# Patient Record
Sex: FEMALE | URBAN - METROPOLITAN AREA
[De-identification: names, ages, dates, MRNs, and addresses within clinical notes are randomized per-mention and may not be internally consistent; named-entity substitution may affect disease eponyms.]

---

## 2018-03-29 ENCOUNTER — INPATIENT (INPATIENT)
Facility: HOSPITAL | Age: 53
LOS: 4 days | Discharge: HOME | End: 2018-04-03
Attending: INTERNAL MEDICINE | Admitting: INTERNAL MEDICINE
Payer: MEDICARE

## 2018-03-29 VITALS
DIASTOLIC BLOOD PRESSURE: 61 MMHG | HEART RATE: 60 BPM | RESPIRATION RATE: 18 BRPM | SYSTOLIC BLOOD PRESSURE: 111 MMHG | OXYGEN SATURATION: 100 %

## 2018-03-29 LAB
ALBUMIN SERPL ELPH-MCNC: 3.7 G/DL — SIGNIFICANT CHANGE UP (ref 3.3–5)
ALP SERPL-CCNC: 102 U/L — SIGNIFICANT CHANGE UP (ref 40–120)
ALT FLD-CCNC: 25 U/L — SIGNIFICANT CHANGE UP (ref 4–33)
ANISOCYTOSIS BLD QL: SLIGHT — SIGNIFICANT CHANGE UP
APPEARANCE UR: CLEAR — SIGNIFICANT CHANGE UP
AST SERPL-CCNC: 23 U/L — SIGNIFICANT CHANGE UP (ref 4–32)
BASE EXCESS BLDV CALC-SCNC: 13.2 MMOL/L — SIGNIFICANT CHANGE UP
BASOPHILS # BLD AUTO: 0.01 K/UL — SIGNIFICANT CHANGE UP (ref 0–0.2)
BASOPHILS NFR BLD AUTO: 0.2 % — SIGNIFICANT CHANGE UP (ref 0–2)
BASOPHILS NFR SPEC: 0 % — SIGNIFICANT CHANGE UP (ref 0–2)
BILIRUB SERPL-MCNC: < 0.2 MG/DL — LOW (ref 0.2–1.2)
BILIRUB UR-MCNC: NEGATIVE — SIGNIFICANT CHANGE UP
BLOOD GAS VENOUS - CREATININE: 0.74 MG/DL — SIGNIFICANT CHANGE UP (ref 0.5–1.3)
BLOOD UR QL VISUAL: NEGATIVE — SIGNIFICANT CHANGE UP
BUN SERPL-MCNC: 18 MG/DL — SIGNIFICANT CHANGE UP (ref 7–23)
CALCIUM SERPL-MCNC: 8.8 MG/DL — SIGNIFICANT CHANGE UP (ref 8.4–10.5)
CHLORIDE BLDV-SCNC: 96 MMOL/L — SIGNIFICANT CHANGE UP (ref 96–108)
CHLORIDE SERPL-SCNC: 94 MMOL/L — LOW (ref 98–107)
CO2 SERPL-SCNC: 37 MMOL/L — HIGH (ref 22–31)
COLOR SPEC: YELLOW — SIGNIFICANT CHANGE UP
CREAT SERPL-MCNC: 0.75 MG/DL — SIGNIFICANT CHANGE UP (ref 0.5–1.3)
EOSINOPHIL # BLD AUTO: 0.31 K/UL — SIGNIFICANT CHANGE UP (ref 0–0.5)
EOSINOPHIL NFR BLD AUTO: 7 % — HIGH (ref 0–6)
EOSINOPHIL NFR FLD: 6.2 % — HIGH (ref 0–6)
GAS PNL BLDV: 138 MMOL/L — SIGNIFICANT CHANGE UP (ref 136–146)
GIANT PLATELETS BLD QL SMEAR: PRESENT — SIGNIFICANT CHANGE UP
GLUCOSE BLDV-MCNC: 86 — SIGNIFICANT CHANGE UP (ref 70–99)
GLUCOSE SERPL-MCNC: 83 MG/DL — SIGNIFICANT CHANGE UP (ref 70–99)
GLUCOSE UR-MCNC: NEGATIVE — SIGNIFICANT CHANGE UP
HCO3 BLDV-SCNC: 34 MMOL/L — HIGH (ref 20–27)
HCT VFR BLD CALC: 38.2 % — SIGNIFICANT CHANGE UP (ref 34.5–45)
HCT VFR BLDV CALC: 38.8 % — SIGNIFICANT CHANGE UP (ref 34.5–45)
HGB BLD-MCNC: 12.1 G/DL — SIGNIFICANT CHANGE UP (ref 11.5–15.5)
HGB BLDV-MCNC: 12.6 G/DL — SIGNIFICANT CHANGE UP (ref 11.5–15.5)
IMM GRANULOCYTES # BLD AUTO: 0.03 # — SIGNIFICANT CHANGE UP
IMM GRANULOCYTES NFR BLD AUTO: 0.7 % — SIGNIFICANT CHANGE UP (ref 0–1.5)
KETONES UR-MCNC: NEGATIVE — SIGNIFICANT CHANGE UP
LACTATE BLDV-MCNC: 1.6 MMOL/L — SIGNIFICANT CHANGE UP (ref 0.5–2)
LEUKOCYTE ESTERASE UR-ACNC: NEGATIVE — SIGNIFICANT CHANGE UP
LYMPHOCYTES # BLD AUTO: 1.63 K/UL — SIGNIFICANT CHANGE UP (ref 1–3.3)
LYMPHOCYTES # BLD AUTO: 37 % — SIGNIFICANT CHANGE UP (ref 13–44)
LYMPHOCYTES NFR SPEC AUTO: 34.2 % — SIGNIFICANT CHANGE UP (ref 13–44)
MACROCYTES BLD QL: SLIGHT — SIGNIFICANT CHANGE UP
MANUAL SMEAR VERIFICATION: SIGNIFICANT CHANGE UP
MCHC RBC-ENTMCNC: 31.7 % — LOW (ref 32–36)
MCHC RBC-ENTMCNC: 33.9 PG — SIGNIFICANT CHANGE UP (ref 27–34)
MCV RBC AUTO: 107 FL — HIGH (ref 80–100)
MONOCYTES # BLD AUTO: 0.41 K/UL — SIGNIFICANT CHANGE UP (ref 0–0.9)
MONOCYTES NFR BLD AUTO: 9.3 % — SIGNIFICANT CHANGE UP (ref 2–14)
MONOCYTES NFR BLD: 7 % — SIGNIFICANT CHANGE UP (ref 2–9)
MUCOUS THREADS # UR AUTO: SIGNIFICANT CHANGE UP
NEUTROPHIL AB SER-ACNC: 44.7 % — SIGNIFICANT CHANGE UP (ref 43–77)
NEUTROPHILS # BLD AUTO: 2.02 K/UL — SIGNIFICANT CHANGE UP (ref 1.8–7.4)
NEUTROPHILS NFR BLD AUTO: 45.8 % — SIGNIFICANT CHANGE UP (ref 43–77)
NEUTS BAND # BLD: 2.6 % — SIGNIFICANT CHANGE UP (ref 0–6)
NITRITE UR-MCNC: NEGATIVE — SIGNIFICANT CHANGE UP
NRBC # FLD: 0.02 — SIGNIFICANT CHANGE UP
NT-PROBNP SERPL-SCNC: 34.91 PG/ML — SIGNIFICANT CHANGE UP
PCO2 BLDV: 72 MMHG — HIGH (ref 41–51)
PH BLDV: 7.36 PH — SIGNIFICANT CHANGE UP (ref 7.32–7.43)
PH UR: 6.5 — SIGNIFICANT CHANGE UP (ref 4.6–8)
PLATELET # BLD AUTO: 142 K/UL — LOW (ref 150–400)
PLATELET COUNT - ESTIMATE: SIGNIFICANT CHANGE UP
PMV BLD: 10.9 FL — SIGNIFICANT CHANGE UP (ref 7–13)
PO2 BLDV: 32 MMHG — LOW (ref 35–40)
POTASSIUM BLDV-SCNC: 5.8 MMOL/L — HIGH (ref 3.4–4.5)
POTASSIUM SERPL-MCNC: 4.2 MMOL/L — SIGNIFICANT CHANGE UP (ref 3.5–5.3)
POTASSIUM SERPL-SCNC: 4.2 MMOL/L — SIGNIFICANT CHANGE UP (ref 3.5–5.3)
PROT SERPL-MCNC: 7.4 G/DL — SIGNIFICANT CHANGE UP (ref 6–8.3)
PROT UR-MCNC: 10 MG/DL — SIGNIFICANT CHANGE UP
RBC # BLD: 3.57 M/UL — LOW (ref 3.8–5.2)
RBC # FLD: 13.3 % — SIGNIFICANT CHANGE UP (ref 10.3–14.5)
RBC CASTS # UR COMP ASSIST: SIGNIFICANT CHANGE UP (ref 0–?)
SAO2 % BLDV: 54 % — LOW (ref 60–85)
SODIUM SERPL-SCNC: 139 MMOL/L — SIGNIFICANT CHANGE UP (ref 135–145)
SP GR SPEC: 1.02 — SIGNIFICANT CHANGE UP (ref 1–1.04)
STOMATOCYTES BLD QL SMEAR: SLIGHT — SIGNIFICANT CHANGE UP
TSH SERPL-MCNC: 4.35 UIU/ML — HIGH (ref 0.27–4.2)
UROBILINOGEN FLD QL: NORMAL MG/DL — SIGNIFICANT CHANGE UP
VARIANT LYMPHS # BLD: 5.3 % — SIGNIFICANT CHANGE UP
WBC # BLD: 4.41 K/UL — SIGNIFICANT CHANGE UP (ref 3.8–10.5)
WBC # FLD AUTO: 4.41 K/UL — SIGNIFICANT CHANGE UP (ref 3.8–10.5)
WBC UR QL: SIGNIFICANT CHANGE UP (ref 0–?)

## 2018-03-29 PROCEDURE — 71045 X-RAY EXAM CHEST 1 VIEW: CPT | Mod: 26

## 2018-03-29 RX ORDER — PIPERACILLIN AND TAZOBACTAM 4; .5 G/20ML; G/20ML
3.38 INJECTION, POWDER, LYOPHILIZED, FOR SOLUTION INTRAVENOUS ONCE
Refills: 0 | Status: COMPLETED | OUTPATIENT
Start: 2018-03-29 | End: 2018-03-29

## 2018-03-29 RX ORDER — SODIUM CHLORIDE 9 MG/ML
1000 INJECTION, SOLUTION INTRAVENOUS ONCE
Refills: 0 | Status: COMPLETED | OUTPATIENT
Start: 2018-03-29 | End: 2018-03-29

## 2018-03-29 RX ADMIN — PIPERACILLIN AND TAZOBACTAM 200 GRAM(S): 4; .5 INJECTION, POWDER, LYOPHILIZED, FOR SOLUTION INTRAVENOUS at 21:22

## 2018-03-29 RX ADMIN — Medication 2 MILLIGRAM(S): at 19:36

## 2018-03-29 RX ADMIN — SODIUM CHLORIDE 2000 MILLILITER(S): 9 INJECTION, SOLUTION INTRAVENOUS at 22:00

## 2018-03-29 NOTE — ED PROVIDER NOTE - MEDICAL DECISION MAKING DETAILS
52F PMH CP, MR, Constipation recently d/c off miralax p/w constipation and urinary retention, hypothermia concerning for sepsis 2/2 UTI vs stearcolitis. Will check CBC CMP UA Blood Cultures Urine cultures, CXR, CT a/p give zosyn and reassess

## 2018-03-29 NOTE — ED ADULT NURSE REASSESSMENT NOTE - NS ED NURSE REASSESS COMMENT FT1
Report received from RN Sarah Angela. No acute distress at present. Pt. received minimally verbal, arousable to voice. Pt's home health aide at bedside, states pt. typically does not speak much. Pt. received with mancilla in place and hypothermia blanket in place. Respirations are even and unlabored on room air. Will continue to monitor. Report received from RN Sarah Angela. No acute distress at present. Pt. received minimally verbal, arousable to voice. Pt. received with blanchable redness to sacrum. Pt's home health aide at bedside, states pt. typically does not speak much. Pt. received with 16 Tongan mancilla in place and hypothermia blanket in place. Respirations are even and unlabored on room air. Will continue to monitor. Report received from RN Sarah Angela. No acute distress at present. Pt. received non-verbal, arousable to voice. Pt. received with blanchable redness to sacrum. Pt's home health aide at bedside. Received with 16 Sudanese mancilla in place draining clear yellow urine and hypothermia blanket in place. Respirations are even and unlabored on room air. Will continue to monitor. Report received from RN Sarah Angela. No acute distress at present. Pt. received non-verbal, arousable to voice. Pt. received with blanchable redness to sacrum and bilateral lower extremity 2+ pitting edema, as per home health aide, this is baseline for her lower extremities. Pt's home health aide at bedside. Received with 16 Citizen of the [place] mancilla in place draining clear yellow urine and hypothermia blanket in place. Respirations are even and unlabored on room air. Will continue to monitor. Report received from RN Sarah Angela. No acute distress at present. Pt. received non-verbal, arousable to voice. Pt. received with blanchable redness to sacrum and bilateral lower extremity 2+ pitting edema worse in bilateral feet, as per home health aide, this is baseline for her lower extremities. Pt's home health aide at bedside. Received with 16 Greek mancilla in place draining clear yellow urine and hypothermia blanket in place. Respirations are even and unlabored on room air. Will continue to monitor.

## 2018-03-29 NOTE — ED ADULT NURSE NOTE - OBJECTIVE STATEMENT
Facilitator note:  pt received to room 18 with PMH Cerebral palsy wheelchair bound as per [facility] pt abdomen is grossly distended and she has not been voiding a Cazares catheter was inserted 16 fr 500cc of urine was noted pt is noted with swelling to her feet and legs.  Pt was noted to be hypothermic rectal temp 93.4 a warming blanket was placed.  The pt was combative during iv accessed MD was made aware the pt was medicated for increase agitated.

## 2018-03-29 NOTE — ED PROVIDER NOTE - OBJECTIVE STATEMENT
52F PMH CP, MR, non-verbal, wheelchair bound p/w two weeks of constipation with decreased urine output today. Pt accompanied by nurse and 52F PMH CP, MR, non-verbal, wheelchair bound p/w two weeks of constipation with decreased urine output today. Pt accompanied by nurse and caretaker at bedside. They report that pt has been having worsening constipation since miralax was removed from her medication regimen by her PMD. Last regular BM was 5 days ago with single pellet like BM yesterday. Denies vomiting, fevers, chills. Endorses abdominal distention which has been worsening today. Nurse noted pt was not urinating today so she brought her in to the ED for evaluation. Pt is behaving normally per staff. 52F PMH CP, MR, non-verbal, wheelchair bound p/w two weeks of constipation with decreased urine output today. Pt accompanied by nurse and caretaker at bedside. They report that pt has been having worsening constipation since Miralax was removed from her medication regimen by her PMD. Last regular BM was 5 days ago with single pellet like BM yesterday. Denies vomiting, fevers, chills. Endorses abdominal distention which has been worsening today. Nurse noted pt was not urinating today so she brought her in to the ED for evaluation. Pt is behaving normally per staff.

## 2018-03-29 NOTE — ED PROVIDER NOTE - ATTENDING CONTRIBUTION TO CARE
I performed a face to face history and physical exam of the patient and discussed their management with the resident. I reviewed the resident's note and agree with the documented findings and plan of care. 51 y/o female w/CP, MR, nonverbal, with nurse and caretaker at bedside, pt has been having worsening constipation since being taken off Miralax, abdominal distension noted, brought in today because of no urination, I performed a face to face history and physical exam of the patient and discussed their management with the resident. I reviewed the resident's note and agree with the documented findings and plan of care. 53 y/o female w/CP, MR, nonverbal, with nurse and caretaker at bedside, pt has been having worsening constipation since being taken off Miralax, abdominal distension noted, brought in today because of no urination, found to be hypothermic in ED, pt acting herself according to nurse, no n/v, no fever, no chills, will evaluate for sepsis, thyroid dysfunction 1)EKG 2)warming blanket 3)CBC, CMP, VBG, Blood cx x 2, TSH, U/a , ucx, 4)CXR 5)IV antibiotics 6)Admit

## 2018-03-29 NOTE — ED ADULT TRIAGE NOTE - CHIEF COMPLAINT QUOTE
Pt brought [facility] staff [facility], c/o lower abdominal distention. As per staff member, pt has not voided since last night. Also c/o B/L LE edema. Pt has hx of cerebral palsy, bipolar

## 2018-03-30 DIAGNOSIS — G40.909 EPILEPSY, UNSPECIFIED, NOT INTRACTABLE, WITHOUT STATUS EPILEPTICUS: ICD-10-CM

## 2018-03-30 DIAGNOSIS — T68.XXXA HYPOTHERMIA, INITIAL ENCOUNTER: ICD-10-CM

## 2018-03-30 DIAGNOSIS — G80.9 CEREBRAL PALSY, UNSPECIFIED: ICD-10-CM

## 2018-03-30 DIAGNOSIS — R33.9 RETENTION OF URINE, UNSPECIFIED: ICD-10-CM

## 2018-03-30 DIAGNOSIS — Z29.9 ENCOUNTER FOR PROPHYLACTIC MEASURES, UNSPECIFIED: ICD-10-CM

## 2018-03-30 DIAGNOSIS — K59.00 CONSTIPATION, UNSPECIFIED: ICD-10-CM

## 2018-03-30 LAB
ALBUMIN SERPL ELPH-MCNC: 3.9 G/DL — SIGNIFICANT CHANGE UP (ref 3.3–5)
ALP SERPL-CCNC: 110 U/L — SIGNIFICANT CHANGE UP (ref 40–120)
ALT FLD-CCNC: 27 U/L — SIGNIFICANT CHANGE UP (ref 4–33)
AST SERPL-CCNC: 28 U/L — SIGNIFICANT CHANGE UP (ref 4–32)
BASOPHILS # BLD AUTO: 0.01 K/UL — SIGNIFICANT CHANGE UP (ref 0–0.2)
BASOPHILS NFR BLD AUTO: 0.2 % — SIGNIFICANT CHANGE UP (ref 0–2)
BILIRUB SERPL-MCNC: 0.2 MG/DL — SIGNIFICANT CHANGE UP (ref 0.2–1.2)
BUN SERPL-MCNC: 19 MG/DL — SIGNIFICANT CHANGE UP (ref 7–23)
CALCIUM SERPL-MCNC: 9.1 MG/DL — SIGNIFICANT CHANGE UP (ref 8.4–10.5)
CHLORIDE SERPL-SCNC: 95 MMOL/L — LOW (ref 98–107)
CO2 SERPL-SCNC: 31 MMOL/L — SIGNIFICANT CHANGE UP (ref 22–31)
CREAT SERPL-MCNC: 0.78 MG/DL — SIGNIFICANT CHANGE UP (ref 0.5–1.3)
EOSINOPHIL # BLD AUTO: 0.14 K/UL — SIGNIFICANT CHANGE UP (ref 0–0.5)
EOSINOPHIL NFR BLD AUTO: 2.1 % — SIGNIFICANT CHANGE UP (ref 0–6)
GLUCOSE SERPL-MCNC: 129 MG/DL — HIGH (ref 70–99)
HCT VFR BLD CALC: 38.5 % — SIGNIFICANT CHANGE UP (ref 34.5–45)
HGB BLD-MCNC: 12.4 G/DL — SIGNIFICANT CHANGE UP (ref 11.5–15.5)
IMM GRANULOCYTES # BLD AUTO: 0.05 # — SIGNIFICANT CHANGE UP
IMM GRANULOCYTES NFR BLD AUTO: 0.8 % — SIGNIFICANT CHANGE UP (ref 0–1.5)
LYMPHOCYTES # BLD AUTO: 1.13 K/UL — SIGNIFICANT CHANGE UP (ref 1–3.3)
LYMPHOCYTES # BLD AUTO: 17 % — SIGNIFICANT CHANGE UP (ref 13–44)
MAGNESIUM SERPL-MCNC: 2.6 MG/DL — SIGNIFICANT CHANGE UP (ref 1.6–2.6)
MCHC RBC-ENTMCNC: 32.2 % — SIGNIFICANT CHANGE UP (ref 32–36)
MCHC RBC-ENTMCNC: 33.7 PG — SIGNIFICANT CHANGE UP (ref 27–34)
MCV RBC AUTO: 104.6 FL — HIGH (ref 80–100)
MONOCYTES # BLD AUTO: 0.57 K/UL — SIGNIFICANT CHANGE UP (ref 0–0.9)
MONOCYTES NFR BLD AUTO: 8.6 % — SIGNIFICANT CHANGE UP (ref 2–14)
NEUTROPHILS # BLD AUTO: 4.75 K/UL — SIGNIFICANT CHANGE UP (ref 1.8–7.4)
NEUTROPHILS NFR BLD AUTO: 71.3 % — SIGNIFICANT CHANGE UP (ref 43–77)
NRBC # FLD: 0.02 — SIGNIFICANT CHANGE UP
PLATELET # BLD AUTO: 155 K/UL — SIGNIFICANT CHANGE UP (ref 150–400)
PMV BLD: 11.1 FL — SIGNIFICANT CHANGE UP (ref 7–13)
POTASSIUM SERPL-MCNC: 4.1 MMOL/L — SIGNIFICANT CHANGE UP (ref 3.5–5.3)
POTASSIUM SERPL-SCNC: 4.1 MMOL/L — SIGNIFICANT CHANGE UP (ref 3.5–5.3)
PROT SERPL-MCNC: 7.9 G/DL — SIGNIFICANT CHANGE UP (ref 6–8.3)
RBC # BLD: 3.68 M/UL — LOW (ref 3.8–5.2)
RBC # FLD: 13.5 % — SIGNIFICANT CHANGE UP (ref 10.3–14.5)
SODIUM SERPL-SCNC: 139 MMOL/L — SIGNIFICANT CHANGE UP (ref 135–145)
SPECIMEN SOURCE: SIGNIFICANT CHANGE UP
SPECIMEN SOURCE: SIGNIFICANT CHANGE UP
T3 SERPL-MCNC: 109.2 NG/DL — SIGNIFICANT CHANGE UP (ref 80–200)
T4 AB SER-ACNC: 5.87 UG/DL — SIGNIFICANT CHANGE UP (ref 5.1–13)
TSH SERPL-MCNC: 5.03 UIU/ML — HIGH (ref 0.27–4.2)
WBC # BLD: 6.65 K/UL — SIGNIFICANT CHANGE UP (ref 3.8–10.5)
WBC # FLD AUTO: 6.65 K/UL — SIGNIFICANT CHANGE UP (ref 3.8–10.5)

## 2018-03-30 PROCEDURE — 74177 CT ABD & PELVIS W/CONTRAST: CPT | Mod: 26

## 2018-03-30 PROCEDURE — 99223 1ST HOSP IP/OBS HIGH 75: CPT

## 2018-03-30 RX ORDER — ASPIRIN/CALCIUM CARB/MAGNESIUM 324 MG
81 TABLET ORAL DAILY
Refills: 0 | Status: DISCONTINUED | OUTPATIENT
Start: 2018-03-30 | End: 2018-04-03

## 2018-03-30 RX ORDER — OLANZAPINE 15 MG/1
7.5 TABLET, FILM COATED ORAL AT BEDTIME
Refills: 0 | Status: DISCONTINUED | OUTPATIENT
Start: 2018-03-30 | End: 2018-04-03

## 2018-03-30 RX ORDER — HEPARIN SODIUM 5000 [USP'U]/ML
5000 INJECTION INTRAVENOUS; SUBCUTANEOUS EVERY 8 HOURS
Refills: 0 | Status: DISCONTINUED | OUTPATIENT
Start: 2018-03-30 | End: 2018-04-03

## 2018-03-30 RX ORDER — POLYETHYLENE GLYCOL 3350 17 G/17G
17 POWDER, FOR SOLUTION ORAL ONCE
Refills: 0 | Status: COMPLETED | OUTPATIENT
Start: 2018-03-30 | End: 2018-03-30

## 2018-03-30 RX ORDER — PIPERACILLIN AND TAZOBACTAM 4; .5 G/20ML; G/20ML
3.38 INJECTION, POWDER, LYOPHILIZED, FOR SOLUTION INTRAVENOUS EVERY 8 HOURS
Refills: 0 | Status: DISCONTINUED | OUTPATIENT
Start: 2018-03-30 | End: 2018-03-30

## 2018-03-30 RX ORDER — FOLIC ACID 0.8 MG
1 TABLET ORAL DAILY
Refills: 0 | Status: DISCONTINUED | OUTPATIENT
Start: 2018-03-30 | End: 2018-04-03

## 2018-03-30 RX ORDER — PIPERACILLIN AND TAZOBACTAM 4; .5 G/20ML; G/20ML
3.38 INJECTION, POWDER, LYOPHILIZED, FOR SOLUTION INTRAVENOUS EVERY 8 HOURS
Refills: 0 | Status: DISCONTINUED | OUTPATIENT
Start: 2018-03-30 | End: 2018-04-01

## 2018-03-30 RX ORDER — CARBAMAZEPINE 200 MG
400 TABLET ORAL
Refills: 0 | Status: DISCONTINUED | OUTPATIENT
Start: 2018-03-30 | End: 2018-03-30

## 2018-03-30 RX ORDER — CARBAMAZEPINE 200 MG
400 TABLET ORAL AT BEDTIME
Refills: 0 | Status: DISCONTINUED | OUTPATIENT
Start: 2018-03-30 | End: 2018-04-03

## 2018-03-30 RX ORDER — ALBUTEROL 90 UG/1
2 AEROSOL, METERED ORAL EVERY 6 HOURS
Refills: 0 | Status: DISCONTINUED | OUTPATIENT
Start: 2018-03-30 | End: 2018-03-31

## 2018-03-30 RX ORDER — THIAMINE MONONITRATE (VIT B1) 100 MG
100 TABLET ORAL DAILY
Refills: 0 | Status: DISCONTINUED | OUTPATIENT
Start: 2018-03-30 | End: 2018-04-03

## 2018-03-30 RX ORDER — BENZTROPINE MESYLATE 1 MG
0.5 TABLET ORAL
Refills: 0 | Status: DISCONTINUED | OUTPATIENT
Start: 2018-03-30 | End: 2018-04-03

## 2018-03-30 RX ORDER — LEVETIRACETAM 250 MG/1
750 TABLET, FILM COATED ORAL
Refills: 0 | Status: DISCONTINUED | OUTPATIENT
Start: 2018-03-30 | End: 2018-04-03

## 2018-03-30 RX ORDER — CARBAMAZEPINE 200 MG
500 TABLET ORAL
Refills: 0 | Status: DISCONTINUED | OUTPATIENT
Start: 2018-03-30 | End: 2018-04-03

## 2018-03-30 RX ORDER — CARBAMAZEPINE 200 MG
100 TABLET ORAL DAILY
Refills: 0 | Status: DISCONTINUED | OUTPATIENT
Start: 2018-03-30 | End: 2018-03-30

## 2018-03-30 RX ADMIN — PIPERACILLIN AND TAZOBACTAM 25 GRAM(S): 4; .5 INJECTION, POWDER, LYOPHILIZED, FOR SOLUTION INTRAVENOUS at 22:44

## 2018-03-30 RX ADMIN — Medication 5 MILLIGRAM(S): at 07:28

## 2018-03-30 RX ADMIN — Medication 1 DROP(S): at 23:07

## 2018-03-30 RX ADMIN — POLYETHYLENE GLYCOL 3350 17 GRAM(S): 17 POWDER, FOR SOLUTION ORAL at 07:28

## 2018-03-30 RX ADMIN — HEPARIN SODIUM 5000 UNIT(S): 5000 INJECTION INTRAVENOUS; SUBCUTANEOUS at 15:23

## 2018-03-30 RX ADMIN — LEVETIRACETAM 750 MILLIGRAM(S): 250 TABLET, FILM COATED ORAL at 23:25

## 2018-03-30 RX ADMIN — Medication 81 MILLIGRAM(S): at 12:44

## 2018-03-30 RX ADMIN — Medication 1 DROP(S): at 15:23

## 2018-03-30 RX ADMIN — Medication 400 MILLIGRAM(S): at 23:26

## 2018-03-30 RX ADMIN — OLANZAPINE 7.5 MILLIGRAM(S): 15 TABLET, FILM COATED ORAL at 23:08

## 2018-03-30 RX ADMIN — Medication 0.5 MILLIGRAM(S): at 23:08

## 2018-03-30 RX ADMIN — Medication 1 MILLIGRAM(S): at 12:44

## 2018-03-30 RX ADMIN — Medication 500 MILLIGRAM(S): at 12:10

## 2018-03-30 RX ADMIN — HEPARIN SODIUM 5000 UNIT(S): 5000 INJECTION INTRAVENOUS; SUBCUTANEOUS at 22:44

## 2018-03-30 RX ADMIN — PIPERACILLIN AND TAZOBACTAM 25 GRAM(S): 4; .5 INJECTION, POWDER, LYOPHILIZED, FOR SOLUTION INTRAVENOUS at 12:44

## 2018-03-30 RX ADMIN — Medication 1 TABLET(S): at 12:44

## 2018-03-30 RX ADMIN — Medication 100 MILLIGRAM(S): at 12:44

## 2018-03-30 NOTE — H&P ADULT - NSHPPHYSICALEXAM_GEN_ALL_CORE
Vital Signs Last 24 Hrs  T(C): 35.3 (30 Mar 2018 05:53), Max: 36.3 (29 Mar 2018 22:36)  T(F): 95.6 (30 Mar 2018 05:53), Max: 97.3 (29 Mar 2018 22:36)  HR: 92 (30 Mar 2018 05:53) (60 - 98)  BP: 106/72 (30 Mar 2018 05:53) (98/68 - 132/99)  BP(mean): --  RR: 18 (30 Mar 2018 05:53) (16 - 18)  SpO2: 97% (30 Mar 2018 05:53) (91% - 100%) Vital Signs Last 24 Hrs  T(C): 35.3 (30 Mar 2018 05:53), Max: 36.3 (29 Mar 2018 22:36)  T(F): 95.6 (30 Mar 2018 05:53), Max: 97.3 (29 Mar 2018 22:36)  HR: 92 (30 Mar 2018 05:53) (60 - 98)  BP: 106/72 (30 Mar 2018 05:53) (98/68 - 132/99)  BP(mean): --  RR: 18 (30 Mar 2018 05:53) (16 - 18)  SpO2: 97% (30 Mar 2018 05:53) (91% - 100%)    General: awake, alert, interactive, non verbal, appears lethargic    HEENT: EOMI, PERRLA, no conjunctival pallor, MMM, no JVD, no thyromegaly, neck supple, trachea midline  CV: S1S2 RRR no MRG  Lungs: CTA BL  Abdomen: soft +BS, +distention, nontender   Extremities: No CCE +WWP  Skin/MSK: No rashes, preserved ROM on active & passive movement  Neuro: non-focal, but does not walk at baseline

## 2018-03-30 NOTE — H&P ADULT - NSHPLABSRESULTS_GEN_ALL_CORE
CBC Full  -  ( 29 Mar 2018 20:55 )  WBC Count : 4.41 K/uL  Hemoglobin : 12.1 g/dL  Hematocrit : 38.2 %  Platelet Count - Automated : 142 K/uL  Mean Cell Volume : 107.0 fL  Mean Cell Hemoglobin : 33.9 pg  Mean Cell Hemoglobin Concentration : 31.7 %  Auto Neutrophil # : 2.02 K/uL  Auto Lymphocyte # : 1.63 K/uL  Auto Monocyte # : 0.41 K/uL  Auto Eosinophil # : 0.31 K/uL  Auto Basophil # : 0.01 K/uL  Auto Neutrophil % : 45.8 %  Auto Lymphocyte % : 37.0 %  Auto Monocyte % : 9.3 %  Auto Eosinophil % : 7.0 %  Auto Basophil % : 0.2 %    03-29    139  |  94<L>  |  18  ----------------------------<  83  4.2   |  37<H>  |  0.75    Ca    8.8      29 Mar 2018 20:55    TPro  7.4  /  Alb  3.7  /  TBili  < 0.2<L>  /  DBili  x   /  AST  23  /  ALT  25  /  AlkPhos  102  03-29    Thyroid Stimulating Hormone, Serum (03.29.18 @ 20:55)    Thyroid Stimulating Hormone, Serum: 4.35 uIU/mL    Blood Gas Venous Comprehensive (03.29.18 @ 20:55)    Blood Gas Venous - Lactate: 1.6: Please note updated reference range. mmol/L    Blood Gas Venous - Chloride: 96 mmol/L    Blood Gas Venous - Creatinine: 0.74 mg/dL    pH, Venous: 7.36 pH    pCO2, Venous: 72 mmHg    pO2, Venous: 32 mmHg    HCO3, Venous: 34 mmol/L    Base Excess, Venous: 13.2: REFERENCE RANGE = -3 + 2 mmol/L mmol/L    Oxygen Saturation, Venous: 54.0 %    Blood Gas Venous - Sodium: 138 mmol/L    Blood Gas Venous - Potassium: 5.8 mmol/L    Blood Gas Venous - Glucose: 86    Blood Gas Venous - Hemoglobin: 12.6 g/dL    Blood Gas Venous - Hematocrit: 38.8 %    Urinalysis (03.29.18 @ 20:21)    Color: YELLOW    Urine Appearance: CLEAR    Glucose: NEGATIVE    Bilirubin: NEGATIVE    Ketone - Urine: NEGATIVE    Specific Gravity: 1.018    Blood: NEGATIVE    pH - Urine: 6.5    Protein, Urine: 10 mg/dL    Urobilinogen: NORMAL mg/dL    Nitrite: NEGATIVE    Leukocyte Esterase Concentration: NEGATIVE    Red Blood Cell - Urine: 0-2    White Blood Cell - Urine: 0-2    Mucus: FEW    < from: CT Abdomen and Pelvis w/ Oral Cont and w/ IV Cont (03.30.18 @ 01:48) >  IMPRESSION:   No acute findings. Endometrial thickening to 1.6 cm. Nonemergent pelvic   ultrasound may be obtained for further evaluation.  < end of copied text >    < from: Xray Chest 1 View AP/PA (03.29.18 @ 20:25) >  INTERPRETATION:  Small-moderate left pleural effusion.  < end of copied text >

## 2018-03-30 NOTE — H&P ADULT - PROBLEM SELECTOR PLAN 6
IMPROVE VTE Individual Risk Assessment, Score = 2, c/w HSQ for DVT ppx           RISK                                                          Points  [  ] Previous VTE                                               3  [  ] Thrombophilia                                            2  [ x ] Lower limb paresis/paralysis                    2    [  ] Active Cancer (in last 6 months)              2   [  ] Immobilization > 24 hrs                             1  [  ] ICU/CCU stay > 24 hours                            1  [  ] Age > 60                                                        1                                            Total Score _____2____

## 2018-03-30 NOTE — H&P ADULT - ASSESSMENT
52F hx of Cerebral Palsy, Intellectual disability, Seizure disorder, Constipation admitted for lethargy, decreased urine output, and abdominal distention in the setting of hypothermia unclear etiology

## 2018-03-30 NOTE — ED ADULT NURSE REASSESSMENT NOTE - NS ED NURSE REASSESS COMMENT FT1
No acute distress at present. Pt. turned and repositioned. Report given to break coverage RN. No acute distress at present. Pt. turned and repositioned. IV not flushing at this time. IV removed, hot packs placed. MD Smith made aware. MD Smith inserted ultrasound guided IV in left ac. Report given to break coverage RN. No acute distress at present. Pt. turned and repositioned. IV noted to be infiltrated. IV removed, hot packs placed. MD Smith made aware and evaluated pt. MD Smith inserted ultrasound guided IV in left ac. Report given to break coverage RN.

## 2018-03-30 NOTE — H&P ADULT - PROBLEM SELECTOR PLAN 2
Unclear etiology, UA negative, however clear urine withdrawn via catheter  - Trial to void urine tomorrow

## 2018-03-30 NOTE — ED ADULT NURSE REASSESSMENT NOTE - NS ED NURSE REASSESS COMMENT FT1
Report received from break coverage CARON Oliva. Pt. returned from CT. No acute distress at present. Respirations are even and unlabored on room air. Will continue to monitor.

## 2018-03-30 NOTE — H&P ADULT - HISTORY OF PRESENT ILLNESS
52F hx of Cerebral Palsy, Intellectual disability, Seizure disorder, Constipation, presents to [facility] ED     In the ED patient received Dulcolax x 1, Ativan 2 mg IM x 1, Zosyn 3.375 g IV x 1 (21:00), Miralax x 1, and LR 1 liter x 1. 52F hx of Cerebral Palsy, Intellectual disability, Seizure disorder, Constipation, presents to [facility] ED for medical evaluation of urinary retention, constipation, and abdominal distention. Patient lives in a group home with other people with disabilities. Her caretaker and RN noticed that the patient had not had a BM since the weekend. They also noticed her abdomen becoming more distended, and yesterday she did not produce any urine. Has history of constipation but no urinary retention. Because of this the patient was brought to the ED for evaluation.     Patient is non-verbal due to cerebral palsy and intellectual disability, so unable to obtain ROS from her.     In the ED patient received Dulcolax x 1, Ativan 2 mg IM x 1, Zosyn 3.375 g IV x 1 (21:00), Miralax x 1, and LR 1 liter x 1.

## 2018-03-30 NOTE — H&P ADULT - PROBLEM SELECTOR PLAN 5
Hx of cerebral palsy with intellectual disability as well as paraplegia (wheelchair bound). No acute changes. Continue to monitor behavioral health

## 2018-03-31 LAB
ALBUMIN SERPL ELPH-MCNC: 3.6 G/DL — SIGNIFICANT CHANGE UP (ref 3.3–5)
ALP SERPL-CCNC: 103 U/L — SIGNIFICANT CHANGE UP (ref 40–120)
ALT FLD-CCNC: 25 U/L — SIGNIFICANT CHANGE UP (ref 4–33)
AST SERPL-CCNC: 24 U/L — SIGNIFICANT CHANGE UP (ref 4–32)
BACTERIA UR CULT: SIGNIFICANT CHANGE UP
BASOPHILS # BLD AUTO: 0.01 K/UL — SIGNIFICANT CHANGE UP (ref 0–0.2)
BASOPHILS NFR BLD AUTO: 0.2 % — SIGNIFICANT CHANGE UP (ref 0–2)
BILIRUB SERPL-MCNC: 0.2 MG/DL — SIGNIFICANT CHANGE UP (ref 0.2–1.2)
BUN SERPL-MCNC: 15 MG/DL — SIGNIFICANT CHANGE UP (ref 7–23)
CALCIUM SERPL-MCNC: 8.7 MG/DL — SIGNIFICANT CHANGE UP (ref 8.4–10.5)
CHLORIDE SERPL-SCNC: 101 MMOL/L — SIGNIFICANT CHANGE UP (ref 98–107)
CO2 SERPL-SCNC: 31 MMOL/L — SIGNIFICANT CHANGE UP (ref 22–31)
CREAT SERPL-MCNC: 0.8 MG/DL — SIGNIFICANT CHANGE UP (ref 0.5–1.3)
EOSINOPHIL # BLD AUTO: 0.14 K/UL — SIGNIFICANT CHANGE UP (ref 0–0.5)
EOSINOPHIL NFR BLD AUTO: 3.1 % — SIGNIFICANT CHANGE UP (ref 0–6)
GLUCOSE SERPL-MCNC: 77 MG/DL — SIGNIFICANT CHANGE UP (ref 70–99)
HCT VFR BLD CALC: 38 % — SIGNIFICANT CHANGE UP (ref 34.5–45)
HGB BLD-MCNC: 11.9 G/DL — SIGNIFICANT CHANGE UP (ref 11.5–15.5)
IMM GRANULOCYTES # BLD AUTO: 0.04 # — SIGNIFICANT CHANGE UP
IMM GRANULOCYTES NFR BLD AUTO: 0.9 % — SIGNIFICANT CHANGE UP (ref 0–1.5)
LYMPHOCYTES # BLD AUTO: 1.88 K/UL — SIGNIFICANT CHANGE UP (ref 1–3.3)
LYMPHOCYTES # BLD AUTO: 42 % — SIGNIFICANT CHANGE UP (ref 13–44)
MAGNESIUM SERPL-MCNC: 2.4 MG/DL — SIGNIFICANT CHANGE UP (ref 1.6–2.6)
MCHC RBC-ENTMCNC: 31.3 % — LOW (ref 32–36)
MCHC RBC-ENTMCNC: 33.3 PG — SIGNIFICANT CHANGE UP (ref 27–34)
MCV RBC AUTO: 106.4 FL — HIGH (ref 80–100)
MONOCYTES # BLD AUTO: 0.48 K/UL — SIGNIFICANT CHANGE UP (ref 0–0.9)
MONOCYTES NFR BLD AUTO: 10.7 % — SIGNIFICANT CHANGE UP (ref 2–14)
NEUTROPHILS # BLD AUTO: 1.93 K/UL — SIGNIFICANT CHANGE UP (ref 1.8–7.4)
NEUTROPHILS NFR BLD AUTO: 43.1 % — SIGNIFICANT CHANGE UP (ref 43–77)
NRBC # FLD: 0.02 — SIGNIFICANT CHANGE UP
PHOSPHATE SERPL-MCNC: 3.3 MG/DL — SIGNIFICANT CHANGE UP (ref 2.5–4.5)
PLATELET # BLD AUTO: 143 K/UL — LOW (ref 150–400)
PMV BLD: 10.8 FL — SIGNIFICANT CHANGE UP (ref 7–13)
POTASSIUM SERPL-MCNC: 4.5 MMOL/L — SIGNIFICANT CHANGE UP (ref 3.5–5.3)
POTASSIUM SERPL-SCNC: 4.5 MMOL/L — SIGNIFICANT CHANGE UP (ref 3.5–5.3)
PROT SERPL-MCNC: 7.6 G/DL — SIGNIFICANT CHANGE UP (ref 6–8.3)
RBC # BLD: 3.57 M/UL — LOW (ref 3.8–5.2)
RBC # FLD: 13.6 % — SIGNIFICANT CHANGE UP (ref 10.3–14.5)
SODIUM SERPL-SCNC: 144 MMOL/L — SIGNIFICANT CHANGE UP (ref 135–145)
SPECIMEN SOURCE: SIGNIFICANT CHANGE UP
WBC # BLD: 4.48 K/UL — SIGNIFICANT CHANGE UP (ref 3.8–10.5)
WBC # FLD AUTO: 4.48 K/UL — SIGNIFICANT CHANGE UP (ref 3.8–10.5)

## 2018-03-31 RX ORDER — SODIUM CHLORIDE 9 MG/ML
500 INJECTION INTRAMUSCULAR; INTRAVENOUS; SUBCUTANEOUS ONCE
Refills: 0 | Status: COMPLETED | OUTPATIENT
Start: 2018-03-31 | End: 2018-03-31

## 2018-03-31 RX ORDER — IPRATROPIUM/ALBUTEROL SULFATE 18-103MCG
3 AEROSOL WITH ADAPTER (GRAM) INHALATION EVERY 6 HOURS
Refills: 0 | Status: DISCONTINUED | OUTPATIENT
Start: 2018-03-31 | End: 2018-04-03

## 2018-03-31 RX ADMIN — Medication 1 DROP(S): at 06:21

## 2018-03-31 RX ADMIN — Medication 400 MILLIGRAM(S): at 21:30

## 2018-03-31 RX ADMIN — Medication 3 MILLILITER(S): at 16:40

## 2018-03-31 RX ADMIN — Medication 500 MILLIGRAM(S): at 08:35

## 2018-03-31 RX ADMIN — Medication 100 MILLIGRAM(S): at 14:47

## 2018-03-31 RX ADMIN — LEVETIRACETAM 750 MILLIGRAM(S): 250 TABLET, FILM COATED ORAL at 17:35

## 2018-03-31 RX ADMIN — Medication 81 MILLIGRAM(S): at 14:46

## 2018-03-31 RX ADMIN — PIPERACILLIN AND TAZOBACTAM 25 GRAM(S): 4; .5 INJECTION, POWDER, LYOPHILIZED, FOR SOLUTION INTRAVENOUS at 21:30

## 2018-03-31 RX ADMIN — ALBUTEROL 2 PUFF(S): 90 AEROSOL, METERED ORAL at 14:55

## 2018-03-31 RX ADMIN — Medication 0.5 MILLIGRAM(S): at 08:35

## 2018-03-31 RX ADMIN — HEPARIN SODIUM 5000 UNIT(S): 5000 INJECTION INTRAVENOUS; SUBCUTANEOUS at 21:29

## 2018-03-31 RX ADMIN — Medication 0.5 MILLIGRAM(S): at 17:35

## 2018-03-31 RX ADMIN — PIPERACILLIN AND TAZOBACTAM 25 GRAM(S): 4; .5 INJECTION, POWDER, LYOPHILIZED, FOR SOLUTION INTRAVENOUS at 07:22

## 2018-03-31 RX ADMIN — Medication 1 TABLET(S): at 14:47

## 2018-03-31 RX ADMIN — LEVETIRACETAM 750 MILLIGRAM(S): 250 TABLET, FILM COATED ORAL at 08:35

## 2018-03-31 RX ADMIN — Medication 1 DROP(S): at 14:48

## 2018-03-31 RX ADMIN — HEPARIN SODIUM 5000 UNIT(S): 5000 INJECTION INTRAVENOUS; SUBCUTANEOUS at 14:48

## 2018-03-31 RX ADMIN — OLANZAPINE 7.5 MILLIGRAM(S): 15 TABLET, FILM COATED ORAL at 21:29

## 2018-03-31 RX ADMIN — Medication 1 DROP(S): at 21:29

## 2018-03-31 RX ADMIN — HEPARIN SODIUM 5000 UNIT(S): 5000 INJECTION INTRAVENOUS; SUBCUTANEOUS at 06:21

## 2018-03-31 RX ADMIN — Medication 1 MILLIGRAM(S): at 14:47

## 2018-03-31 RX ADMIN — SODIUM CHLORIDE 1000 MILLILITER(S): 9 INJECTION INTRAMUSCULAR; INTRAVENOUS; SUBCUTANEOUS at 06:44

## 2018-03-31 RX ADMIN — PIPERACILLIN AND TAZOBACTAM 25 GRAM(S): 4; .5 INJECTION, POWDER, LYOPHILIZED, FOR SOLUTION INTRAVENOUS at 14:46

## 2018-04-01 LAB
ALBUMIN SERPL ELPH-MCNC: 3.2 G/DL — LOW (ref 3.3–5)
ALP SERPL-CCNC: 85 U/L — SIGNIFICANT CHANGE UP (ref 40–120)
ALT FLD-CCNC: 24 U/L — SIGNIFICANT CHANGE UP (ref 4–33)
AST SERPL-CCNC: 27 U/L — SIGNIFICANT CHANGE UP (ref 4–32)
BASOPHILS # BLD AUTO: 0.02 K/UL — SIGNIFICANT CHANGE UP (ref 0–0.2)
BASOPHILS NFR BLD AUTO: 0.3 % — SIGNIFICANT CHANGE UP (ref 0–2)
BILIRUB SERPL-MCNC: 0.2 MG/DL — SIGNIFICANT CHANGE UP (ref 0.2–1.2)
BUN SERPL-MCNC: 14 MG/DL — SIGNIFICANT CHANGE UP (ref 7–23)
CALCIUM SERPL-MCNC: 8.5 MG/DL — SIGNIFICANT CHANGE UP (ref 8.4–10.5)
CHLORIDE SERPL-SCNC: 105 MMOL/L — SIGNIFICANT CHANGE UP (ref 98–107)
CO2 SERPL-SCNC: 29 MMOL/L — SIGNIFICANT CHANGE UP (ref 22–31)
CREAT SERPL-MCNC: 0.85 MG/DL — SIGNIFICANT CHANGE UP (ref 0.5–1.3)
EOSINOPHIL # BLD AUTO: 0.26 K/UL — SIGNIFICANT CHANGE UP (ref 0–0.5)
EOSINOPHIL NFR BLD AUTO: 4.2 % — SIGNIFICANT CHANGE UP (ref 0–6)
FOLATE SERPL-MCNC: > 20 NG/ML — HIGH (ref 4.7–20)
GLUCOSE SERPL-MCNC: 80 MG/DL — SIGNIFICANT CHANGE UP (ref 70–99)
HCT VFR BLD CALC: 34.5 % — SIGNIFICANT CHANGE UP (ref 34.5–45)
HGB BLD-MCNC: 10.9 G/DL — LOW (ref 11.5–15.5)
IMM GRANULOCYTES # BLD AUTO: 0.04 # — SIGNIFICANT CHANGE UP
IMM GRANULOCYTES NFR BLD AUTO: 0.6 % — SIGNIFICANT CHANGE UP (ref 0–1.5)
LYMPHOCYTES # BLD AUTO: 2.4 K/UL — SIGNIFICANT CHANGE UP (ref 1–3.3)
LYMPHOCYTES # BLD AUTO: 38.3 % — SIGNIFICANT CHANGE UP (ref 13–44)
MCHC RBC-ENTMCNC: 31.6 % — LOW (ref 32–36)
MCHC RBC-ENTMCNC: 33.5 PG — SIGNIFICANT CHANGE UP (ref 27–34)
MCV RBC AUTO: 106.2 FL — HIGH (ref 80–100)
MONOCYTES # BLD AUTO: 0.7 K/UL — SIGNIFICANT CHANGE UP (ref 0–0.9)
MONOCYTES NFR BLD AUTO: 11.2 % — SIGNIFICANT CHANGE UP (ref 2–14)
NEUTROPHILS # BLD AUTO: 2.84 K/UL — SIGNIFICANT CHANGE UP (ref 1.8–7.4)
NEUTROPHILS NFR BLD AUTO: 45.4 % — SIGNIFICANT CHANGE UP (ref 43–77)
NRBC # FLD: 0.02 — SIGNIFICANT CHANGE UP
PLATELET # BLD AUTO: 147 K/UL — LOW (ref 150–400)
PMV BLD: 10.7 FL — SIGNIFICANT CHANGE UP (ref 7–13)
POTASSIUM SERPL-MCNC: 4.5 MMOL/L — SIGNIFICANT CHANGE UP (ref 3.5–5.3)
POTASSIUM SERPL-SCNC: 4.5 MMOL/L — SIGNIFICANT CHANGE UP (ref 3.5–5.3)
PROT SERPL-MCNC: 6.8 G/DL — SIGNIFICANT CHANGE UP (ref 6–8.3)
RBC # BLD: 3.25 M/UL — LOW (ref 3.8–5.2)
RBC # FLD: 14 % — SIGNIFICANT CHANGE UP (ref 10.3–14.5)
SODIUM SERPL-SCNC: 144 MMOL/L — SIGNIFICANT CHANGE UP (ref 135–145)
VIT B12 SERPL-MCNC: 378 PG/ML — SIGNIFICANT CHANGE UP (ref 200–900)
WBC # BLD: 6.26 K/UL — SIGNIFICANT CHANGE UP (ref 3.8–10.5)
WBC # FLD AUTO: 6.26 K/UL — SIGNIFICANT CHANGE UP (ref 3.8–10.5)

## 2018-04-01 PROCEDURE — 99222 1ST HOSP IP/OBS MODERATE 55: CPT

## 2018-04-01 RX ADMIN — Medication 500 MILLIGRAM(S): at 09:47

## 2018-04-01 RX ADMIN — HEPARIN SODIUM 5000 UNIT(S): 5000 INJECTION INTRAVENOUS; SUBCUTANEOUS at 13:44

## 2018-04-01 RX ADMIN — OLANZAPINE 7.5 MILLIGRAM(S): 15 TABLET, FILM COATED ORAL at 23:14

## 2018-04-01 RX ADMIN — LEVETIRACETAM 750 MILLIGRAM(S): 250 TABLET, FILM COATED ORAL at 17:52

## 2018-04-01 RX ADMIN — Medication 81 MILLIGRAM(S): at 13:43

## 2018-04-01 RX ADMIN — Medication 0.5 MILLIGRAM(S): at 06:18

## 2018-04-01 RX ADMIN — Medication 3 MILLILITER(S): at 11:16

## 2018-04-01 RX ADMIN — Medication 1 DROP(S): at 13:44

## 2018-04-01 RX ADMIN — Medication 1 MILLIGRAM(S): at 13:44

## 2018-04-01 RX ADMIN — PIPERACILLIN AND TAZOBACTAM 25 GRAM(S): 4; .5 INJECTION, POWDER, LYOPHILIZED, FOR SOLUTION INTRAVENOUS at 06:17

## 2018-04-01 RX ADMIN — PIPERACILLIN AND TAZOBACTAM 25 GRAM(S): 4; .5 INJECTION, POWDER, LYOPHILIZED, FOR SOLUTION INTRAVENOUS at 14:09

## 2018-04-01 RX ADMIN — Medication 100 MILLIGRAM(S): at 13:44

## 2018-04-01 RX ADMIN — HEPARIN SODIUM 5000 UNIT(S): 5000 INJECTION INTRAVENOUS; SUBCUTANEOUS at 23:14

## 2018-04-01 RX ADMIN — Medication 400 MILLIGRAM(S): at 23:14

## 2018-04-01 RX ADMIN — LEVETIRACETAM 750 MILLIGRAM(S): 250 TABLET, FILM COATED ORAL at 06:18

## 2018-04-01 RX ADMIN — HEPARIN SODIUM 5000 UNIT(S): 5000 INJECTION INTRAVENOUS; SUBCUTANEOUS at 06:17

## 2018-04-01 RX ADMIN — Medication 0.5 MILLIGRAM(S): at 17:52

## 2018-04-01 RX ADMIN — Medication 1 DROP(S): at 23:14

## 2018-04-01 RX ADMIN — Medication 1 TABLET(S): at 13:44

## 2018-04-01 RX ADMIN — Medication 1 DROP(S): at 06:18

## 2018-04-01 NOTE — CONSULT NOTE ADULT - ASSESSMENT
52 year old female with cerebral palsy, intellectual disability, seizure disorder, constipation presented to the ED for medical evaluation of urinary retention, constipation, and abdominal distention. From group home.     In the ED, patient had temperature reading as low as 93.4F rectally. Now resolved, WBC WNL. Patient was started on zosyn. Thus far, urine and blood cxs negative. CT A/P demonstrating no acute findings - thickened endometrium. Cazares was placed, but now removed. Per nurse, patient is urinating. No localizing signs of infection.    Recommend:  -Would discontinue antibiotics.  -Continue to monitor temperature curve off antibiotics.    Higinio Eckert MD  Pager [phone]  After 5pm/weekends call [phone] 52 year old female with cerebral palsy, intellectual disability, seizure disorder, constipation presented to the ED for medical evaluation of urinary retention, constipation, and abdominal distention. From group home.     In the ED, patient had temperature reading as low as 93.4F rectally. Now resolved, WBC WNL. Patient was started on zosyn. Thus far, urine and blood cxs negative. CXR with effusion, however, CT A/P did not demonstrate this in lower cuts.     CT A/P demonstrating no acute findings - thickened endometrium. Cazares was placed, but now removed. Per nurse, patient is urinating. No localizing signs of infection.    Recommend:  -Would discontinue antibiotics.  -Continue to monitor temperature curve off antibiotics.    Higinio Eckert MD  Pager [phone]  After 5pm/weekends call [phone]

## 2018-04-01 NOTE — CONSULT NOTE ADULT - SUBJECTIVE AND OBJECTIVE BOX
HPI:  52 year old female with cerebral palsy, intellectual disability, seizure disorder, constipation presented to the ED for medical evaluation of urinary retention, constipation, and abdominal distention. Patient lives in a group home with other people with disabilities. Her caretaker and RN noticed that the patient had not had a BM since the weekend. They also noticed her abdomen becoming more distended, and yesterday she did not produce any urine. Has history of constipation but no urinary retention. Per caretaker at bedside, patient normally nonverbal, wheelchair bound. Because of this the patient was brought to the ED for evaluation.     Patient is non-verbal due to cerebral palsy and intellectual disability, unable to obtain ROS from her.     In the ED, patient had temperature reading as low as 93.4F rectally. Patient was started on zosyn. Thus far, urine and blood cxs negative. CT A/P demonstrating no acute findings - thickened endometrium. Cazares was placed, but now removed. Per nurse, patient is urinating.    PAST MEDICAL & SURGICAL HISTORY:  Constipation  Seizure disorder  Cerebral palsy  No significant past surgical history    Allergies    Topamax (Unknown)    Intolerances    ANTIMICROBIALS:  piperacillin/tazobactam IVPB. 3.375 every 8 hours      OTHER MEDS:  ALBUTerol/ipratropium for Nebulization 3 milliLiter(s) Nebulizer every 6 hours PRN  artificial  tears Solution 1 Drop(s) Both EYES three times a day  aspirin enteric coated 81 milliGRAM(s) Oral daily  benztropine 0.5 milliGRAM(s) Oral two times a day  carBAMazepine XR Tablet 500 milliGRAM(s) Oral <User Schedule>  carBAMazepine XR Tablet 400 milliGRAM(s) Oral at bedtime  folic acid 1 milliGRAM(s) Oral daily  heparin  Injectable 5000 Unit(s) SubCutaneous every 8 hours  levETIRAcetam 750 milliGRAM(s) Oral two times a day  multivitamin 1 Tablet(s) Oral daily  OLANZapine 7.5 milliGRAM(s) Oral at bedtime  thiamine 100 milliGRAM(s) Oral daily    SOCIAL HISTORY: No alcohol, smoking, drug use.    FAMILY HISTORY:  No pertinent family history in first degree relatives    Drug Dosing Weight    Weight (kg): 81.6 (01 Apr 2018 10:37)    PE:    Vital Signs Last 24 Hrs  T(C): 37.1 (01 Apr 2018 05:38), Max: 37.1 (01 Apr 2018 05:38)  T(F): 98.7 (01 Apr 2018 05:38), Max: 98.7 (01 Apr 2018 05:38)  HR: 74 (01 Apr 2018 11:16) (60 - 90)  BP: 158/75 (01 Apr 2018 05:38) (122/65 - 158/75)  BP(mean): --  RR: 20 (01 Apr 2018 05:38) (18 - 20)  SpO2: 93% (01 Apr 2018 05:38) (93% - 97%)    Gen: Awake, NAD  CV: S1+S2 normal, no murmurs  Resp: Clear bilat, no resp distress  Abd: Soft, nontender, +BS, small laceration on abd - no erythema  Ext: LE edema  : No Cazares  IV/Skin: No sacral decub, right arm with warmth and ulcerative lesions - per nurse had an infiltrated IV line in the past.  Msk: No low back pain, no arthralgias, no joint swelling  Neuro: Not following commands    LABS:                          10.9   6.26  )-----------( 147      ( 01 Apr 2018 07:30 )             34.5       04-01    144  |  105  |  14  ----------------------------<  80  4.5   |  29  |  0.85    Ca    8.5      01 Apr 2018 07:30  Phos  3.3     03-31  Mg     2.4     03-31    TPro  6.8  /  Alb  3.2<L>  /  TBili  0.2  /  DBili  x   /  AST  27  /  ALT  24  /  AlkPhos  85  04-01    MICROBIOLOGY:  v  URINE CATHETER  03-29-18 --  --  --      BLOOD VENOUS  03-29-18 --  --  --      BLOOD PERIPHERAL  03-29-18 --  --  --    RADIOLOGY:    < from: CT Abdomen and Pelvis w/ Oral Cont and w/ IV Cont (03.30.18 @ 01:48) >  IMPRESSION:     No acute findings. Endometrial thickening to 1.6 cm. Nonemergent pelvic   ultrasound may be obtained for further evaluation.    < end of copied text > HPI:  52 year old female with cerebral palsy, intellectual disability, seizure disorder, constipation presented to the ED for medical evaluation of urinary retention, constipation, and abdominal distention. Patient lives in a group home with other people with disabilities. Her caretaker and RN noticed that the patient had not had a BM since the weekend. They also noticed her abdomen becoming more distended, and yesterday she did not produce any urine. Has history of constipation but no urinary retention. Per caretaker at bedside, patient normally nonverbal, wheelchair bound. Because of this the patient was brought to the ED for evaluation.     Patient is non-verbal due to cerebral palsy and intellectual disability, unable to obtain ROS from her.     In the ED, patient had temperature reading as low as 93.4F rectally. Patient was started on zosyn. Thus far, urine and blood cxs negative. CT A/P demonstrating no acute findings - thickened endometrium. Cazares was placed, but now removed. Per nurse, patient is urinating.    PAST MEDICAL & SURGICAL HISTORY:  Constipation  Seizure disorder  Cerebral palsy  No significant past surgical history    Allergies    Topamax (Unknown)    Intolerances    ANTIMICROBIALS:  piperacillin/tazobactam IVPB. 3.375 every 8 hours      OTHER MEDS:  ALBUTerol/ipratropium for Nebulization 3 milliLiter(s) Nebulizer every 6 hours PRN  artificial  tears Solution 1 Drop(s) Both EYES three times a day  aspirin enteric coated 81 milliGRAM(s) Oral daily  benztropine 0.5 milliGRAM(s) Oral two times a day  carBAMazepine XR Tablet 500 milliGRAM(s) Oral <User Schedule>  carBAMazepine XR Tablet 400 milliGRAM(s) Oral at bedtime  folic acid 1 milliGRAM(s) Oral daily  heparin  Injectable 5000 Unit(s) SubCutaneous every 8 hours  levETIRAcetam 750 milliGRAM(s) Oral two times a day  multivitamin 1 Tablet(s) Oral daily  OLANZapine 7.5 milliGRAM(s) Oral at bedtime  thiamine 100 milliGRAM(s) Oral daily    SOCIAL HISTORY: No alcohol, smoking, drug use.    FAMILY HISTORY:  No pertinent family history in first degree relatives    Drug Dosing Weight    Weight (kg): 81.6 (01 Apr 2018 10:37)    PE:    Vital Signs Last 24 Hrs  T(C): 37.1 (01 Apr 2018 05:38), Max: 37.1 (01 Apr 2018 05:38)  T(F): 98.7 (01 Apr 2018 05:38), Max: 98.7 (01 Apr 2018 05:38)  HR: 74 (01 Apr 2018 11:16) (60 - 90)  BP: 158/75 (01 Apr 2018 05:38) (122/65 - 158/75)  BP(mean): --  RR: 20 (01 Apr 2018 05:38) (18 - 20)  SpO2: 93% (01 Apr 2018 05:38) (93% - 97%)    Gen: Awake, NAD  CV: S1+S2 normal, no murmurs  Resp: anterior exam clear bilat, no resp distress  Abd: Soft, nontender, +BS, small laceration on abd - no erythema  Ext: LE edema  : No Cazares  IV/Skin: No sacral decub, right arm with warmth and ulcerative lesions - per nurse had an infiltrated IV line in the past.  Msk: No low back pain, no arthralgias, no joint swelling  Neuro: Not following commands    LABS:                          10.9   6.26  )-----------( 147      ( 01 Apr 2018 07:30 )             34.5       04-01    144  |  105  |  14  ----------------------------<  80  4.5   |  29  |  0.85    Ca    8.5      01 Apr 2018 07:30  Phos  3.3     03-31  Mg     2.4     03-31    TPro  6.8  /  Alb  3.2<L>  /  TBili  0.2  /  DBili  x   /  AST  27  /  ALT  24  /  AlkPhos  85  04-01    MICROBIOLOGY:  v  URINE CATHETER  03-29-18 --  --  --      BLOOD VENOUS  03-29-18 --  --  --      BLOOD PERIPHERAL  03-29-18 --  --  --    RADIOLOGY:  CT A/P reviewed:     No acute findings. Endometrial thickening to 1.6 cm. Nonemergent pelvic   ultrasound may be obtained for further evaluation.    < end of copied text >    CXR reviewed:  < from: Xray Chest 1 View AP/PA (03.29.18 @ 20:25) >  IMPRESSION:   Small-moderate left pleural effusion with obscured left base.    < end of copied text >

## 2018-04-02 ENCOUNTER — TRANSCRIPTION ENCOUNTER (OUTPATIENT)
Age: 53
End: 2018-04-02

## 2018-04-02 PROCEDURE — 99232 SBSQ HOSP IP/OBS MODERATE 35: CPT

## 2018-04-02 PROCEDURE — 76856 US EXAM PELVIC COMPLETE: CPT | Mod: 26

## 2018-04-02 RX ORDER — CARBAMAZEPINE 200 MG
1 TABLET ORAL
Qty: 0 | Refills: 0 | DISCHARGE
Start: 2018-04-02

## 2018-04-02 RX ORDER — MUPIROCIN 20 MG/G
1 OINTMENT TOPICAL
Refills: 0 | Status: DISCONTINUED | OUTPATIENT
Start: 2018-04-02 | End: 2018-04-03

## 2018-04-02 RX ORDER — CARBAMAZEPINE 200 MG
5 TABLET ORAL
Qty: 0 | Refills: 0 | DISCHARGE
Start: 2018-04-02

## 2018-04-02 RX ADMIN — Medication 100 MILLIGRAM(S): at 13:53

## 2018-04-02 RX ADMIN — Medication 1 MILLIGRAM(S): at 13:53

## 2018-04-02 RX ADMIN — LEVETIRACETAM 750 MILLIGRAM(S): 250 TABLET, FILM COATED ORAL at 05:59

## 2018-04-02 RX ADMIN — HEPARIN SODIUM 5000 UNIT(S): 5000 INJECTION INTRAVENOUS; SUBCUTANEOUS at 22:41

## 2018-04-02 RX ADMIN — HEPARIN SODIUM 5000 UNIT(S): 5000 INJECTION INTRAVENOUS; SUBCUTANEOUS at 13:53

## 2018-04-02 RX ADMIN — Medication 1 DROP(S): at 05:59

## 2018-04-02 RX ADMIN — LEVETIRACETAM 750 MILLIGRAM(S): 250 TABLET, FILM COATED ORAL at 17:05

## 2018-04-02 RX ADMIN — Medication 400 MILLIGRAM(S): at 22:41

## 2018-04-02 RX ADMIN — Medication 1 TABLET(S): at 13:53

## 2018-04-02 RX ADMIN — OLANZAPINE 7.5 MILLIGRAM(S): 15 TABLET, FILM COATED ORAL at 22:41

## 2018-04-02 RX ADMIN — Medication 0.5 MILLIGRAM(S): at 05:59

## 2018-04-02 RX ADMIN — Medication 1 APPLICATION(S): at 23:47

## 2018-04-02 RX ADMIN — Medication 81 MILLIGRAM(S): at 13:53

## 2018-04-02 RX ADMIN — Medication 1 DROP(S): at 22:41

## 2018-04-02 RX ADMIN — MUPIROCIN 1 APPLICATION(S): 20 OINTMENT TOPICAL at 22:41

## 2018-04-02 RX ADMIN — Medication 3 MILLILITER(S): at 13:00

## 2018-04-02 RX ADMIN — Medication 1 DROP(S): at 13:53

## 2018-04-02 RX ADMIN — Medication 500 MILLIGRAM(S): at 09:15

## 2018-04-02 RX ADMIN — Medication 0.5 MILLIGRAM(S): at 17:05

## 2018-04-02 RX ADMIN — HEPARIN SODIUM 5000 UNIT(S): 5000 INJECTION INTRAVENOUS; SUBCUTANEOUS at 05:59

## 2018-04-02 NOTE — DISCHARGE NOTE ADULT - MEDICATION SUMMARY - MEDICATIONS TO CHANGE
I will SWITCH the dose or number of times a day I take the medications listed below when I get home from the hospital:    TEGretol  mg oral tablet, extended release  -- 1 tab(s) by mouth once a day (in the morning)    TEGretol  mg oral tablet, extended release  -- 1 tab(s) by mouth 2 times a day I will SWITCH the dose or number of times a day I take the medications listed below when I get home from the hospital:  None

## 2018-04-02 NOTE — DISCHARGE NOTE ADULT - MEDICATION SUMMARY - MEDICATIONS TO STOP TAKING
I will STOP taking the medications listed below when I get home from the hospital:    clindamycin 1% topical lotion  -- Apply on skin to affected area once a day

## 2018-04-02 NOTE — DISCHARGE NOTE ADULT - CARE PLAN
Principal Discharge DX:	Hypothermia, initial encounter  Goal:	Resolved  Assessment and plan of treatment:	- ID house consulted  - Temp of 95.6 and lethargy. Also accompanied by constipation and urinary retention.  - BCx- neg, UCx- neg, Ua-negatve  - treated with Zosyn x 3 days d/c on 4/1  - Continue to monitor temperature curve off antibiotics.  - elevated TSH- 5.03. Free T3, T4 -WNL, Vit , Folate >20.0  Secondary Diagnosis:	Urinary retention  Assessment and plan of treatment:	- Unclear etiology,   - 3/31 Cazares d/c -TOV passed  - CT A/P- No acute findings. Endometrial thickening to 1.6 cm.  - Pelvic US- Review of the prior CT scan of 03/30/2018 indicates that the patient's endometrium is actually within the range of normal. Normal size uterus.  Secondary Diagnosis:	Seizure disorder  Assessment and plan of treatment:	- continue with Keppra and Carbamazepine.  Secondary Diagnosis:	Constipation, unspecified constipation type  Assessment and plan of treatment:	- continue with Miralax and Lactulose. Enema PRN.  Secondary Diagnosis:	Cerebral palsy, unspecified type  Assessment and plan of treatment:	- Hx of cerebral palsy with intellectual disability as well as paraplegia (wheelchair bound).   - No acute changes. Continue to monitor behavioral health. Principal Discharge DX:	Hypothermia, initial encounter  Goal:	Resolved  Assessment and plan of treatment:	- ID house consulted  - Temp of 95.6 and lethargy. Also accompanied by constipation and urinary retention.  - BCx- neg, UCx- neg, Ua-negatve  - treated with Zosyn x 3 days d/c on 4/1  - Continue to monitor temperature curve off antibiotics.  - elevated TSH- 5.03. Free T3, T4 -WNL, Vit , Folate >20.0  Secondary Diagnosis:	Urinary retention  Assessment and plan of treatment:	- Unclear etiology,   - 3/31 Cazares d/c -TOV passed  - CT A/P- No acute findings. Endometrial thickening to 1.6 cm.  - Pelvic US- Review of the prior CT scan of 03/30/2018 indicates that the patient's endometrium is actually within the range of normal. Normal size uterus.  Secondary Diagnosis:	Seizure disorder  Assessment and plan of treatment:	- continue with Keppra and Carbamazepine.  Secondary Diagnosis:	Constipation, unspecified constipation type  Assessment and plan of treatment:	- continue with Miralax and Lactulose. Enema PRN.  Secondary Diagnosis:	Cerebral palsy, unspecified type  Assessment and plan of treatment:	- Hx of cerebral palsy with intellectual disability as well as paraplegia (wheelchair bound).   - No acute changes. Continue to monitor behavioral health.  Secondary Diagnosis:	Skin erosion  Assessment and plan of treatment:	- Derm team consulted recommended to Mix Triamcinolone 0.1% ointment with Mupirocin 2% ointment and apply BID x 1-2 weeks. Principal Discharge DX:	Hypothermia, initial encounter  Goal:	Resolved  Assessment and plan of treatment:	- ID house consulted  - Temp of 95.6 and lethargy. Also accompanied by constipation and urinary retention.  - BCx- neg, UCx- neg, Ua-negatve  - treated with Zosyn x 3 days d/c on 4/1  - Continue to monitor temperature curve off antibiotics.  - elevated TSH- 5.03. Free T3, T4 -WNL, Vit , Folate >20.0  Secondary Diagnosis:	Urinary retention  Assessment and plan of treatment:	- Unclear etiology,   - 3/31 Cazares discontinued -TOV passed  - CT A/P- No acute findings. Endometrial thickening to 1.6 cm.  - Pelvic US- Review of the prior CT scan of 03/30/2018 indicates that the patient's endometrium is actually within the range of normal. Normal size uterus.  Secondary Diagnosis:	Seizure disorder  Assessment and plan of treatment:	- continue with Keppra and Carbamazepine.  Secondary Diagnosis:	Constipation, unspecified constipation type  Assessment and plan of treatment:	- continue with Miralax and Lactulose. Enema PRN.  Secondary Diagnosis:	Cerebral palsy, unspecified type  Assessment and plan of treatment:	- Hx of cerebral palsy with intellectual disability as well as paraplegia (wheelchair bound).   - No acute changes. Continue to monitor behavioral health.  Secondary Diagnosis:	Skin erosion  Assessment and plan of treatment:	- Derm team consulted recommended to Mix Triamcinolone 0.1% ointment with Mupirocin 2% ointment and apply 2x a day for 1-2 weeks.

## 2018-04-02 NOTE — DISCHARGE NOTE ADULT - SECONDARY DIAGNOSIS.
Seizure disorder Constipation, unspecified constipation type Cerebral palsy, unspecified type Urinary retention Skin erosion

## 2018-04-02 NOTE — DISCHARGE NOTE ADULT - MEDICATION SUMMARY - MEDICATIONS TO TAKE
I will START or STAY ON the medications listed below when I get home from the hospital:    Aspirin Enteric Coated 81 mg oral delayed release tablet  -- 1 tab(s) by mouth once a day  -- Indication: For Prophylactic measure    Keppra 750 mg oral tablet  -- 1 tab(s) by mouth 2 times a day  -- Indication: For Seizure disorder    carBAMazepine 400 mg oral tablet, extended release  -- 1 tab(s) by mouth once a day (at bedtime)  -- Indication: For Seizure disorder    carBAMazepine 100 mg oral tablet, extended release  -- 5 tab(s) by mouth once a day in the morning  -- Indication: For Seizure disorder    Cogentin 0.5 mg oral tablet  -- 1 tab(s) by mouth 2 times a day  -- Indication: For Seizure disorder    OLANZapine 7.5 mg oral tablet  -- 1 tab(s) by mouth once a day (in the evening)  -- Indication: For Schizo    Ventolin HFA 90 mcg/inh inhalation aerosol  -- 2 puff(s) inhaled 4 times a day, As Needed  -- Indication: For Asthma    MetroGel 0.75% topical gel  -- Apply on skin to affected area once a day on face  -- Indication: For Prophylactic measure    nystatin 100,000 units/g topical cream  -- Apply on skin to affected area once a day between toes  -- Indication: For Skin care    furosemide 40 mg oral tablet  -- 1 tab(s) by mouth once a day  -- Indication: For fluid overload    ferrous sulfate 325 mg (65 mg elemental iron) oral tablet  -- 1 tab(s) by mouth once a day  -- Indication: For Anemia    lactulose 10 g/15 mL oral solution  -- 15 milliliter(s) by mouth 2 times a day  -- Indication: For Constipation    senna 8.6 mg oral tablet  -- 2 tab(s) by mouth once a day (at bedtime)  -- Indication: For Constipation    Flonase 50 mcg/inh nasal spray  -- 1 spray(s) into nose 2 times a day  -- Indication: For Post nasal drip    Artificial Tears ophthalmic ointment  -- 1 application to each affected eye once a day (in the morning)  -- Indication: For eye drops    Artificial Tears ophthalmic solution  -- 1 drop(s) to each affected eye 3 times a day  -- Indication: For eye drops    NexIUM 20 mg oral delayed release capsule  -- 1 cap(s) by mouth once a day  -- Indication: For GERD    Multiple Vitamins oral tablet  -- 1 tab(s) by mouth once a day  -- Indication: For Prophylactic measure    Calcium 600+D 600 mg-200 intl units oral tablet  -- 1 tab(s) by mouth 2 times a day  -- Indication: For Prophylactic measure    folic acid 1 mg oral tablet  -- 1 tab(s) by mouth once a day  -- Indication: For Supplement    Vitamin B1 100 mg oral tablet  -- 1 tab(s) by mouth once a day  -- Indication: For Supplement I will START or STAY ON the medications listed below when I get home from the hospital:    Aspirin Enteric Coated 81 mg oral delayed release tablet  -- 1 tab(s) by mouth once a day  -- Indication: For blood thinner    Keppra 750 mg oral tablet  -- 1 tab(s) by mouth 2 times a day  -- Indication: For Seizure disorder    carBAMazepine 400 mg oral tablet, extended release  -- 1 tab(s) by mouth once a day (at bedtime)  -- Indication: For Seizure disorder    carBAMazepine 100 mg oral tablet, extended release  -- 5 tab(s) by mouth once a day in the morning  -- Indication: For Seizure disorder    Cogentin 0.5 mg oral tablet  -- 1 tab(s) by mouth 2 times a day  -- Indication: For Cerebral palsy    OLANZapine 7.5 mg oral tablet  -- 1 tab(s) by mouth once a day (in the evening)  -- Indication: For Cerebral palsy    Ventolin HFA 90 mcg/inh inhalation aerosol  -- 2 puff(s) inhaled 4 times a day, As Needed  -- Indication: For Shortness of breath    MetroGel 0.75% topical gel  -- Apply on skin to affected area once a day on face  -- Indication: For derm    nystatin 100,000 units/g topical cream  -- Apply on skin to affected area once a day between toes  -- Indication: For antifungal    mupirocin 2% topical ointment  -- 1 application on skin 2 times a day to Rt anticubital fossa for 1-2 weeks  -- Indication: For ointment    triamcinolone 0.1% topical ointment  -- 1 application on skin 2 times a day  to Rt anticubital fossa for 1-2 weeks  -- Indication: For ointment    furosemide 40 mg oral tablet  -- 1 tab(s) by mouth once a day  -- Indication: For diuretic    ferrous sulfate 325 mg (65 mg elemental iron) oral tablet  -- 1 tab(s) by mouth once a day  -- Indication: For iron    lactulose 10 g/15 mL oral solution  -- 15 milliliter(s) by mouth 2 times a day  -- Indication: For Constipation    senna 8.6 mg oral tablet  -- 2 tab(s) by mouth once a day (at bedtime)  -- Indication: For Constipation    Flonase 50 mcg/inh nasal spray  -- 1 spray(s) into nose 2 times a day  -- Indication: For Nasal spray    Artificial Tears ophthalmic ointment  -- 1 application to each affected eye once a day (in the morning)  -- Indication: For eye drops    Artificial Tears ophthalmic solution  -- 1 drop(s) to each affected eye 3 times a day  -- Indication: For eye drops    NexIUM 20 mg oral delayed release capsule  -- 1 cap(s) by mouth once a day  -- Indication: For Stomach protection    Multiple Vitamins oral tablet  -- 1 tab(s) by mouth once a day  -- Indication: For Supplement    Calcium 600+D 600 mg-200 intl units oral tablet  -- 1 tab(s) by mouth 2 times a day  -- Indication: For Supplement    folic acid 1 mg oral tablet  -- 1 tab(s) by mouth once a day  -- Indication: For Supplement    Vitamin B1 100 mg oral tablet  -- 1 tab(s) by mouth once a day  -- Indication: For Supplement

## 2018-04-02 NOTE — CONSULT NOTE ADULT - SUBJECTIVE AND OBJECTIVE BOX
Patient is a 52y old F w/ PMH of cerebral palsy, admitted from a group home for urinary retention. Cultures negative to date. Per primary team, the plan was for discharge today until they noticed a red rash with several water blisters on the R antecubital fossa. Per her aid, she had an IV site there last week, which was removed on Saturday 3/31. NO similar lesions elsewhere on the body. No fevers or other systemic sx.     REVIEW OF SYSTEMS: as per HPI    T(C): 36.8 (04-02-18 @ 14:26), Max: 36.8 (04-01-18 @ 21:08)  HR: 81 (04-02-18 @ 14:26) (72 - 85)  BP: 136/74 (04-02-18 @ 14:26) (123/75 - 136/74)  RR: 18 (04-02-18 @ 14:26) (18 - 18)  SpO2: 97% (04-02-18 @ 14:26) (96% - 98%)  Wt(kg): --Vital Signs Last 24 Hrs  T(C): 36.8 (02 Apr 2018 14:26), Max: 36.8 (01 Apr 2018 21:08)  T(F): 98.3 (02 Apr 2018 14:26), Max: 98.3 (01 Apr 2018 21:08)  HR: 81 (02 Apr 2018 14:26) (72 - 85)  BP: 136/74 (02 Apr 2018 14:26) (123/75 - 136/74)  BP(mean): --  RR: 18 (02 Apr 2018 14:26) (18 - 18)  SpO2: 97% (02 Apr 2018 14:26) (96% - 98%)    PHYSICAL EXAM:  GENERAL: NAD  SKIN: erythema of the R antecubital fossa with scattered small erosions and circular erosions medially.    LABS:                        10.9   6.26  )-----------( 147      ( 01 Apr 2018 07:30 )             34.5     04-01    144  |  105  |  14  ----------------------------<  80  4.5   |  29  |  0.85    Ca    8.5      01 Apr 2018 07:30    TPro  6.8  /  Alb  3.2<L>  /  TBili  0.2  /  DBili  x   /  AST  27  /  ALT  24  /  AlkPhos  85  04-01 Patient is a 52y old F w/ PMH of cerebral palsy, admitted from a group home for urinary retention. Cultures negative to date. Per primary team, the plan was for discharge today until they noticed a red rash with several water blisters on the R antecubital fossa. Per her aid, she had an IV site there last week, which was removed on Saturday 3/31. No similar lesions elsewhere on the body. No fevers or other systemic sx.     REVIEW OF SYSTEMS: as per HPI    T(C): 36.8 (04-02-18 @ 14:26), Max: 36.8 (04-01-18 @ 21:08)  HR: 81 (04-02-18 @ 14:26) (72 - 85)  BP: 136/74 (04-02-18 @ 14:26) (123/75 - 136/74)  RR: 18 (04-02-18 @ 14:26) (18 - 18)  SpO2: 97% (04-02-18 @ 14:26) (96% - 98%)  Wt(kg): --Vital Signs Last 24 Hrs  T(C): 36.8 (02 Apr 2018 14:26), Max: 36.8 (01 Apr 2018 21:08)  T(F): 98.3 (02 Apr 2018 14:26), Max: 98.3 (01 Apr 2018 21:08)  HR: 81 (02 Apr 2018 14:26) (72 - 85)  BP: 136/74 (02 Apr 2018 14:26) (123/75 - 136/74)  BP(mean): --  RR: 18 (02 Apr 2018 14:26) (18 - 18)  SpO2: 97% (02 Apr 2018 14:26) (96% - 98%)    PHYSICAL EXAM:  GENERAL: NAD  SKIN: erythema of the R antecubital fossa with scattered small erosions and circular erosions medially.    LABS:                        10.9   6.26  )-----------( 147      ( 01 Apr 2018 07:30 )             34.5     04-01    144  |  105  |  14  ----------------------------<  80  4.5   |  29  |  0.85    Ca    8.5      01 Apr 2018 07:30    TPro  6.8  /  Alb  3.2<L>  /  TBili  0.2  /  DBili  x   /  AST  27  /  ALT  24  /  AlkPhos  85  04-01

## 2018-04-02 NOTE — DISCHARGE NOTE ADULT - HOSPITAL COURSE
52F hx of Cerebral Palsy, Intellectual disability, Seizure disorder, Constipation, presents to [facility] ED for medical evaluation of urinary retention, constipation, and abdominal distention. Patient lives in a group home with other people with disabilities  Hospital Course    Hypothermia, initial encounter- ID house consulted. Temp of 95.6 and lethargy. Also accompanied by constipation and urinary retention. BCx- neg, UCx- neg, UA-negatve. Treated with Zosyn x 3 days d/c on 4/1. Continue to monitor temperature curve off antibiotics. Elevated TSH- 5.03. Free T3, T4 -WNL, Vit , Folate >20.0    Urinary retention- Unclear etiology, 3/31 Cazares d/c -TOV passed. CT A/P- No acute findings. Endometrial thickening to 1.6 cm (per Rad Endo thickening is 0.6 Normal). Pelvic US- Review of the prior CT scan of 03/30/2018 indicates that the patient's endometrium is actually within the range of normal. Normal size uterus.    Constipation- c/w Miralax and Lactulose. Enema PRN.     Seizure disorder- c/w Keppra and Carbamazepine.     Cerebral palsy- Hx of cerebral palsy with intellectual disability as well as paraplegia (wheelchair bound). No acute changes. Continue to monitor behavioral health.     Prophylactic measure- heparin sq. Hypotension- 3/31 90/60 S/p 500 cc bolus. Resolved 52F hx of Cerebral Palsy, Intellectual disability, Seizure disorder, Constipation, presents to [facility] ED for medical evaluation of urinary retention, constipation, and abdominal distention. Patient lives in a group home with other people with disabilities  Hospital Course    Hypothermia, initial encounter- ID house consulted. Temp of 95.6 and lethargy. Also accompanied by constipation and urinary retention. BCx- neg, UCx- neg, UA-negatve. Treated with Zosyn x 3 days d/c on 4/1. Continue to monitor temperature curve off antibiotics. Elevated TSH- 5.03. Free T3, T4 -WNL, Vit , Folate >20.0    Urinary retention- Unclear etiology, 3/31 Cazares d/c -TOV passed. CT A/P- No acute findings. Endometrial thickening to 1.6 cm (per Rad Endo thickening is 0.6 Normal). Pelvic US- Review of the prior CT scan of 03/30/2018 indicates that the patient's endometrium is actually within the range of normal. Normal size uterus.    Erosions and erythema of R antecubital fossa- Derm consulted recs c/w an irritant and allergic contact dermatitis, likely from the tape at the old IV site. Mix triamcinolone 0.1% ointment with mupirocin 2% ointment and apply BID x 1-2 weeks.    Constipation- c/w Miralax and Lactulose. Enema PRN.     Seizure disorder- c/w Keppra and Carbamazepine.     Cerebral palsy- Hx of cerebral palsy with intellectual disability as well as paraplegia (wheelchair bound). No acute changes. Continue to monitor behavioral health.     Prophylactic measure- heparin sq. Hypotension- 3/31 90/60 S/p 500 cc bolus. Resolved 52F hx of Cerebral Palsy, Intellectual disability, Seizure disorder, Constipation, presents to [facility] ED for medical evaluation of urinary retention, constipation, and abdominal distention. Patient lives in a group home with other people with disabilities  Hospital Course    Hypothermia, initial encounter- ID house consulted. Temp of 95.6 and lethargy. Also accompanied by constipation and urinary retention. BCx- neg, UCx- neg, UA-negatve. Treated with Zosyn x 3 days d/c on 4/1. Continue to monitor temperature curve off antibiotics. Elevated TSH- 5.03. Free T3, T4 -WNL, Vit , Folate >20.0    Urinary retention- Unclear etiology, 3/31 Cazares d/c -TOV passed. CT A/P- No acute findings. Endometrial thickening to 1.6 cm (per Rad Endo thickening is 0.6 Normal). Pelvic US- Review of the prior CT scan of 03/30/2018 indicates that the patient's endometrium is actually within the range of normal. Normal size uterus.    Erosions and erythema of R antecubital fossa- Derm consulted recs c/w an irritant and allergic contact dermatitis, likely from the tape at the old IV site. Mix triamcinolone 0.1% ointment with mupirocin 2% ointment and apply BID x 1-2 weeks.    Constipation- c/w Miralax and Lactulose. Enema PRN.     Seizure disorder- c/w Keppra and Carbamazepine.     Cerebral palsy- Hx of cerebral palsy with intellectual disability as well as paraplegia (wheelchair bound). No acute changes. Continue to monitor behavioral health.     Prophylactic measure- heparin sq. Hypotension- 3/31 90/60 S/p 500 cc bolus. Resolved     dispo: back to group home

## 2018-04-02 NOTE — CONSULT NOTE ADULT - ASSESSMENT
1.) Erosions and erythema of R antecubital fossa    ***to be staffed be attending today 1.) Erosions and erythema of R antecubital fossa- most c/w an irritant and allergic contact dermatitis, likely from the tape at the old IV site.  -Mix triamcinolone 0.1% ointment with mupirocin 2% ointment and apply BID x 1-2 weeks.

## 2018-04-02 NOTE — DISCHARGE NOTE ADULT - PATIENT PORTAL LINK FT
You can access the [facility] Patient Portal, offered by [facility], by registering with the following website: [email or web address]

## 2018-04-03 VITALS
TEMPERATURE: 98 F | HEART RATE: 93 BPM | RESPIRATION RATE: 20 BRPM | OXYGEN SATURATION: 96 % | SYSTOLIC BLOOD PRESSURE: 115 MMHG | DIASTOLIC BLOOD PRESSURE: 73 MMHG

## 2018-04-03 LAB
BACTERIA BLD CULT: SIGNIFICANT CHANGE UP
BACTERIA BLD CULT: SIGNIFICANT CHANGE UP

## 2018-04-03 PROCEDURE — 99232 SBSQ HOSP IP/OBS MODERATE 35: CPT

## 2018-04-03 RX ORDER — MUPIROCIN 20 MG/G
1 OINTMENT TOPICAL
Qty: 1 | Refills: 0
Start: 2018-04-03

## 2018-04-03 RX ADMIN — Medication 500 MILLIGRAM(S): at 09:16

## 2018-04-03 RX ADMIN — Medication 1 APPLICATION(S): at 07:02

## 2018-04-03 RX ADMIN — HEPARIN SODIUM 5000 UNIT(S): 5000 INJECTION INTRAVENOUS; SUBCUTANEOUS at 07:01

## 2018-04-03 RX ADMIN — Medication 0.5 MILLIGRAM(S): at 07:00

## 2018-04-03 RX ADMIN — MUPIROCIN 1 APPLICATION(S): 20 OINTMENT TOPICAL at 07:02

## 2018-04-03 RX ADMIN — LEVETIRACETAM 750 MILLIGRAM(S): 250 TABLET, FILM COATED ORAL at 07:00

## 2018-04-03 RX ADMIN — Medication 1 DROP(S): at 07:00

## 2018-04-03 NOTE — PROGRESS NOTE ADULT - SUBJECTIVE AND OBJECTIVE BOX
CC: Patient is a 52y old  Female who presents with a chief complaint of urinary retention and constipation    Interval History/ROS: Patient nonverbal. Remains afebrile. WBC WNL. Right arm erythema improving.    Allergies  Topamax (Unknown)    ANTIMICROBIALS:  None    OTHER MEDS:    mupirocin 2% Ointment 1 Application(s) Topical two times a day  triamcinolone 0.1% Ointment 1 Application(s) Topical two times a day      PE:    Vital Signs Last 24 Hrs  T(C): 36.8 (03 Apr 2018 06:58), Max: 36.8 (02 Apr 2018 14:26)  T(F): 98.2 (03 Apr 2018 06:58), Max: 98.3 (02 Apr 2018 14:26)  HR: 93 (03 Apr 2018 06:58) (60 - 93)  BP: 115/73 (03 Apr 2018 06:58) (108/63 - 139/73)  BP(mean): --  RR: 20 (03 Apr 2018 06:58) (18 - 20)  SpO2: 96% (03 Apr 2018 06:58) (96% - 99%)    Gen: Awake, NAD, non-toxic  CV: S1+S2 normal, no murmurs  Resp: Clear bilat, no resp distress  Abd: Soft, nontender, +BS  Ext: right arm with erythema improving, with blister lesions.  : No Cazares  IV/Skin: No thrombophlebitis  Neuro: not following commands      LABS:    MICROBIOLOGY:  v  URINE CATHETER  03-29-18 --  --  --      BLOOD VENOUS  03-29-18 --  --  --      BLOOD PERIPHERAL  03-29-18 --  --  --    RADIOLOGY:  < from: US Pelvis Complete (04.02.18 @ 11:29) >  IMPRESSION:    Limited pelvic ultrasound. Normal size uterus.        < end of copied text >    < from: CT Abdomen and Pelvis w/ Oral Cont and w/ IV Cont (03.30.18 @ 01:48) >  IMPRESSION:     No acute findings. Endometrial thickening to 1.6 cm. Nonemergent pelvic   ultrasound may be obtained for further evaluation.    < end of copied text >
CC: Patient is a 52y old  Female who presents with a chief complaint of urinary retention and constipation    Interval History/ROS: Patient remains nonverbal. Remains afebrile. WBC WNL. Right arm lesions more erythematous now with bullous lesions, patient scratching skin at that site.    Allergies  Topamax (Unknown)    ANTIMICROBIALS:  None    PE:    Vital Signs Last 24 Hrs  T(C): 36.3 (02 Apr 2018 05:13), Max: 36.8 (01 Apr 2018 21:08)  T(F): 97.4 (02 Apr 2018 05:13), Max: 98.3 (01 Apr 2018 21:08)  HR: 72 (02 Apr 2018 13:00) (72 - 85)  BP: 123/75 (02 Apr 2018 05:13) (123/75 - 132/78)  BP(mean): --  RR: 18 (02 Apr 2018 05:13) (18 - 18)  SpO2: 96% (02 Apr 2018 05:13) (96% - 98%)    Gen: Awake, NAD, non-toxic  CV: S1+S2 normal, no murmurs  Resp: Clear bilat, no resp distress  Abd: Soft, nontender, +BS  Ext: right arm with erythema, warmth, and bullous lesions.  : No Cazares  IV/Skin: No thrombophlebitis  Neuro: not following commands    LABS:                          10.9   6.26  )-----------( 147      ( 01 Apr 2018 07:30 )             34.5       04-01    144  |  105  |  14  ----------------------------<  80  4.5   |  29  |  0.85    Ca    8.5      01 Apr 2018 07:30    TPro  6.8  /  Alb  3.2<L>  /  TBili  0.2  /  DBili  x   /  AST  27  /  ALT  24  /  AlkPhos  85  04-01    MICROBIOLOGY:  v  URINE CATHETER  03-29-18 --  --  --    BLOOD VENOUS  03-29-18 --  --  --    BLOOD PERIPHERAL  03-29-18 --  --  --    RADIOLOGY:    < from: US Pelvis Complete (04.02.18 @ 11:29) >  IMPRESSION:    Limited pelvic ultrasound. Normal size uterus.      < end of copied text >
INTERVAL HPI/OVERNIGHT EVENTS: AID from group home in room . Said she had BM and eating well so ready to go back.   Vital Signs Last 24 Hrs  T(C): 36.7 (31 Mar 2018 08:48), Max: 36.8 (31 Mar 2018 05:29)  T(F): 98.1 (31 Mar 2018 08:48), Max: 98.3 (31 Mar 2018 05:29)  HR: 77 (31 Mar 2018 08:48) (75 - 99)  BP: 119/63 (31 Mar 2018 08:48) (90/65 - 126/66)  BP(mean): --  RR: 18 (31 Mar 2018 08:48) (16 - 20)  SpO2: 98% (31 Mar 2018 08:48) (93% - 99%)  I&O's Summary    30 Mar 2018 07:01  -  31 Mar 2018 07:00  --------------------------------------------------------  IN: 450 mL / OUT: 500 mL / NET: -50 mL      MEDICATIONS  (STANDING):  artificial  tears Solution 1 Drop(s) Both EYES three times a day  aspirin enteric coated 81 milliGRAM(s) Oral daily  benztropine 0.5 milliGRAM(s) Oral two times a day  carBAMazepine XR Tablet 500 milliGRAM(s) Oral <User Schedule>  carBAMazepine XR Tablet 400 milliGRAM(s) Oral at bedtime  folic acid 1 milliGRAM(s) Oral daily  heparin  Injectable 5000 Unit(s) SubCutaneous every 8 hours  levETIRAcetam 750 milliGRAM(s) Oral two times a day  multivitamin 1 Tablet(s) Oral daily  OLANZapine 7.5 milliGRAM(s) Oral at bedtime  piperacillin/tazobactam IVPB. 3.375 Gram(s) IV Intermittent every 8 hours  thiamine 100 milliGRAM(s) Oral daily    MEDICATIONS  (PRN):  ALBUTerol    90 MICROgram(s) HFA Inhaler 2 Puff(s) Inhalation every 6 hours PRN Shortness of Breath and/or Wheezing    LABS:                        11.9   4.48  )-----------( 143      ( 31 Mar 2018 07:50 )             38.0     03-31    144  |  101  |  15  ----------------------------<  77  4.5   |  31  |  0.80    Ca    8.7      31 Mar 2018 07:50  Phos  3.3     -  Mg     2.4         TPro  7.6  /  Alb  3.6  /  TBili  0.2  /  DBili  x   /  AST  24  /  ALT  25  /  AlkPhos  103        Urinalysis Basic - ( 29 Mar 2018 20:21 )    Color: YELLOW / Appearance: CLEAR / S.018 / pH: 6.5  Gluc: NEGATIVE / Ketone: NEGATIVE  / Bili: NEGATIVE / Urobili: NORMAL mg/dL   Blood: NEGATIVE / Protein: 10 mg/dL / Nitrite: NEGATIVE   Leuk Esterase: NEGATIVE / RBC: 0-2 / WBC 0-2   Sq Epi: x / Non Sq Epi: x / Bacteria: x      CAPILLARY BLOOD GLUCOSE            Urinalysis Basic - ( 29 Mar 2018 20:21 )    Color: YELLOW / Appearance: CLEAR / S.018 / pH: 6.5  Gluc: NEGATIVE / Ketone: NEGATIVE  / Bili: NEGATIVE / Urobili: NORMAL mg/dL   Blood: NEGATIVE / Protein: 10 mg/dL / Nitrite: NEGATIVE   Leuk Esterase: NEGATIVE / RBC: 0-2 / WBC 0-2   Sq Epi: x / Non Sq Epi: x / Bacteria: x      Consultant(s) Notes Reviewed:  [x ] YES  [ ] NO    PHYSICAL EXAM:  GENERAL: NAD, well-groomed, well-developed,not in any distress ,  HEAD:  Atraumatic, Normocephalic  EYES: EOMI, PERRLA, conjunctiva and sclera clear  ENMT: No tonsillar erythema, exudates, or enlargement; Moist mucous membranes, Good dentition, No lesions  NECK: Supple, No JVD, Normal thyroid  NERVOUS SYSTEM:  Alert & non communicative ,  CHEST/LUNG: Good air entry bilateral with no  rales, rhonchi, wheezing, or rubs  HEART: Regular rate and rhythm; No murmurs, rubs, or gallops  ABDOMEN: Soft, Nontender, Nondistended; Bowel sounds present  EXTREMITIES:  2+ Peripheral Pulses, No clubbing, cyanosis, or edema  SKIN: rash rt antecubital     Care Discussed with Consultants/Other Providers [ x] YES  [ ] NO
INTERVAL HPI/OVERNIGHT EVENTS: Seen and examined with AID from group home in room .Said she is fine and back to her normal.   Vital Signs Last 24 Hrs  T(C): 36.9 (01 Apr 2018 13:41), Max: 37.1 (01 Apr 2018 05:38)  T(F): 98.5 (01 Apr 2018 13:41), Max: 98.7 (01 Apr 2018 05:38)  HR: 81 (01 Apr 2018 13:41) (60 - 90)  BP: 120/58 (01 Apr 2018 13:41) (120/58 - 158/75)  BP(mean): --  RR: 18 (01 Apr 2018 13:41) (18 - 20)  SpO2: 97% (01 Apr 2018 13:41) (93% - 97%)  I&O's Summary    31 Mar 2018 07:01  -  01 Apr 2018 07:00  --------------------------------------------------------  IN: 650 mL / OUT: 600 mL / NET: 50 mL      MEDICATIONS  (STANDING):  artificial  tears Solution 1 Drop(s) Both EYES three times a day  aspirin enteric coated 81 milliGRAM(s) Oral daily  benztropine 0.5 milliGRAM(s) Oral two times a day  carBAMazepine XR Tablet 500 milliGRAM(s) Oral <User Schedule>  carBAMazepine XR Tablet 400 milliGRAM(s) Oral at bedtime  folic acid 1 milliGRAM(s) Oral daily  heparin  Injectable 5000 Unit(s) SubCutaneous every 8 hours  levETIRAcetam 750 milliGRAM(s) Oral two times a day  multivitamin 1 Tablet(s) Oral daily  OLANZapine 7.5 milliGRAM(s) Oral at bedtime  piperacillin/tazobactam IVPB. 3.375 Gram(s) IV Intermittent every 8 hours  thiamine 100 milliGRAM(s) Oral daily    MEDICATIONS  (PRN):  ALBUTerol/ipratropium for Nebulization 3 milliLiter(s) Nebulizer every 6 hours PRN Shortness of Breath and/or Wheezing    LABS:                        10.9   6.26  )-----------( 147      ( 01 Apr 2018 07:30 )             34.5     04-01    144  |  105  |  14  ----------------------------<  80  4.5   |  29  |  0.85    Ca    8.5      01 Apr 2018 07:30  Phos  3.3     03-31  Mg     2.4     03-31    TPro  6.8  /  Alb  3.2<L>  /  TBili  0.2  /  DBili  x   /  AST  27  /  ALT  24  /  AlkPhos  85  04-01            Consultant(s) Notes Reviewed:  [x ] YES  [ ] NO    PHYSICAL EXAM:  GENERAL: NAD, not in any distress ,  HEAD:  Atraumatic, Normocephalic  EYES: EOMI, PERRLA, conjunctiva and sclera clear  ENMT: No tonsillar erythema, exudates, or enlargement; Moist mucous membranes, Good dentition, No lesions  NECK: Supple, No JVD, Normal thyroid  NERVOUS SYSTEM:  Alert & non communicative   CHEST/LUNG: Good air entry bilateral with no  rales, rhonchi, wheezing, or rubs  HEART: Regular rate and rhythm; No murmurs, rubs, or gallops  ABDOMEN: Soft, Nontender, Nondistended; Bowel sounds present  EXTREMITIES:  2+ Peripheral Pulses, No clubbing, cyanosis, or edema  SKIN: No rashes or lesions    Care Discussed with Consultants/Other Providers [ x] YES  [ ] NO
INTERVAL HPI/OVERNIGHT EVENTS: Seen and examined with group home nurse and AID in room. Said she is doing great .   Vital Signs Last 24 Hrs  T(C): 36.8 (02 Apr 2018 14:26), Max: 36.8 (01 Apr 2018 21:08)  T(F): 98.3 (02 Apr 2018 14:26), Max: 98.3 (01 Apr 2018 21:08)  HR: 81 (02 Apr 2018 14:26) (72 - 85)  BP: 136/74 (02 Apr 2018 14:26) (123/75 - 136/74)  BP(mean): --  RR: 18 (02 Apr 2018 14:26) (18 - 18)  SpO2: 97% (02 Apr 2018 14:26) (96% - 98%)  I&O's Summary    MEDICATIONS  (STANDING):  artificial  tears Solution 1 Drop(s) Both EYES three times a day  aspirin enteric coated 81 milliGRAM(s) Oral daily  benztropine 0.5 milliGRAM(s) Oral two times a day  carBAMazepine XR Tablet 500 milliGRAM(s) Oral <User Schedule>  carBAMazepine XR Tablet 400 milliGRAM(s) Oral at bedtime  folic acid 1 milliGRAM(s) Oral daily  heparin  Injectable 5000 Unit(s) SubCutaneous every 8 hours  levETIRAcetam 750 milliGRAM(s) Oral two times a day  multivitamin 1 Tablet(s) Oral daily  mupirocin 2% Ointment 1 Application(s) Topical two times a day  OLANZapine 7.5 milliGRAM(s) Oral at bedtime  thiamine 100 milliGRAM(s) Oral daily  triamcinolone 0.1% Ointment 1 Application(s) Topical two times a day    MEDICATIONS  (PRN):  ALBUTerol/ipratropium for Nebulization 3 milliLiter(s) Nebulizer every 6 hours PRN Shortness of Breath and/or Wheezing    LABS:                        10.9   6.26  )-----------( 147      ( 01 Apr 2018 07:30 )             34.5     04-01    144  |  105  |  14  ----------------------------<  80  4.5   |  29  |  0.85    Ca    8.5      01 Apr 2018 07:30    TPro  6.8  /  Alb  3.2<L>  /  TBili  0.2  /  DBili  x   /  AST  27  /  ALT  24  /  AlkPhos  85  04-01      REVIEW OF SYSTEMS:  CONSTITUTIONAL: No fever, weight loss, or fatigue  EYES: No eye pain, visual disturbances, or discharge  ENMT:  No difficulty hearing, tinnitus, vertigo; No sinus or throat pain  NECK: No pain or stiffness  BREASTS: No pain, masses, or nipple discharge  RESPIRATORY: No cough, wheezing, chills or hemoptysis; No shortness of breath  CARDIOVASCULAR: No chest pain, palpitations, dizziness, or leg swelling  GASTROINTESTINAL: No abdominal or epigastric pain. No nausea, vomiting, or hematemesis; No diarrhea or constipation. No melena or hematochezia.  GENITOURINARY: No dysuria, frequency, hematuria, or incontinence  NEUROLOGICAL: No headaches, memory loss, loss of strength, numbness, or tremors  SKIN: No itching, burning, rashes, or lesions     Consultant(s) Notes Reviewed:  [x ] YES  [ ] NO    PHYSICAL EXAM:  GENERAL: NAD, well-groomed, well-developed ,not in any distress ,  HEAD:  Atraumatic, Normocephalic  EYES: EOMI, PERRLA, conjunctiva and sclera clear  ENMT: No tonsillar erythema, exudates, or enlargement; Moist mucous membranes, Good dentition, No lesions  NECK: Supple, No JVD, Normal thyroid  NERVOUS SYSTEM:  Alert &non communicative   CHEST/LUNG: Good air entry bilateral with no  rales, rhonchi, wheezing, or rubs  HEART: Regular rate and rhythm; No murmurs, rubs, or gallops  ABDOMEN: Soft, Nontender, Nondistended; Bowel sounds present  EXTREMITIES:  2+ Peripheral Pulses, No clubbing, cyanosis, or edema  SKIN: rash rt antecubital area    Care Discussed with Consultants/Other Providers [ x] YES  [ ] NO

## 2018-04-03 NOTE — PROGRESS NOTE ADULT - ASSESSMENT
52F hx of Cerebral Palsy, Intellectual disability, Seizure disorder, Constipation admitted for lethargy, decreased urine output, and abdominal distention in the setting of hypothermia unclear etiology          Problem/Plan - 1:  ·  Problem: Hypothermia, initial encounter.  Plan: Resolved . Septic work up negative . ID consulted and Dcing Abxs.      Problem/Plan - 2:  ·  Problem: Urinary retention.  Plan: TOV .      Problem/Plan - 3:  ·  Problem: Constipation.  Plan: Resolved.  Would c/w Miralax and lactulose. Enema PRN.      Problem/Plan - 4:  ·  Problem: Seizure disorder.  Plan: c/w Keppra and Carbamazepine.      Problem/Plan - 5:  ·  Problem: Cerebral palsy.  Plan: Hx of cerebral palsy with intellectual disability as well as paraplegia (wheelchair bound). No acute changes. Continue to monitor behavioral health.      Problem/Plan - 6:  Problem: Endometrial thickening . Plan: Pelvic US normal.      Problem/Plan - 7:  Problem: Rash . Plan: Dermatology consult noted.     Disposition : Dc pt back to group home as  medically stable.
52F hx of Cerebral Palsy, Intellectual disability, Seizure disorder, Constipation admitted for lethargy, decreased urine output, and abdominal distention in the setting of hypothermia unclear etiology          Problem/Plan - 1:  ·  Problem: Hypothermia, initial encounter.  Plan: Resolved . Septic work up negative . ID consulted and Dcing Abxs.      Problem/Plan - 2:  ·  Problem: Urinary retention.  Plan: TOV .      Problem/Plan - 3:  ·  Problem: Constipation.  Plan: Resolved.  Would c/w Miralax and lactulose. Enema PRN.      Problem/Plan - 4:  ·  Problem: Seizure disorder.  Plan: c/w Keppra and Carbamazepine.      Problem/Plan - 5:  ·  Problem: Cerebral palsy.  Plan: Hx of cerebral palsy with intellectual disability as well as paraplegia (wheelchair bound). No acute changes. Continue to monitor behavioral health.      Problem/Plan - 6:  Problem: Endometrial thickening . Plan:Will need Inpatient VS Oupt Pelvic Us and Gyn follow up. If leaving will get outpt.      Problem/Plan - 7:  Problem: Prophylactic measure. Plan: IMPROVE VTE Individual Risk Assessment, Score = 2, c/w HSQ for DVT ppx     Disposition : Dc planning back to group home .
52 year old female with cerebral palsy, intellectual disability, seizure disorder, constipation presented to the ED for medical evaluation of urinary retention, constipation, and abdominal distention. From group home.     WBC WNL. Urine and blood cxs negative.     CT A/P demonstrating no acute findings - thickened endometrium. US with normal size uterus. Cazares was placed, but now removed.     Right arm erythema and blisters improving now on mupirocin and triamcinolone ointments.    Recommend:  -Continue to monitor off antibiotics.  -Appreciate Derm eval. Continue mupirocin and triamcinolone ointments.    Please call with questions.    Higinio Eckert MD  Pager [phone]  After 5pm/weekends call [phone]
52F hx of Cerebral Palsy, Intellectual disability, Seizure disorder, Constipation admitted for lethargy, decreased urine output, and abdominal distention in the setting of hypothermia unclear etiology          Problem/Plan - 1:  ·  Problem: Hypothermia, initial encounter.  Plan:NO evidence of sepsis.   - Patient with T of 95.6 and lethargy. Also accompanied by constipation and urinary retention. DDx includes sepsis v. hypothyroidism.   - Check BCx, UCx, c/w abx for now until culture negative  - Bear hugger PRN  - Check Free T3, T4 given elevated TSH.      Problem/Plan - 2:  ·  Problem: Urinary retention.  Plan: Unclear etiology, UA negative, however clear urine withdrawn via catheter  - Trial to void today      Problem/Plan - 3:  ·  Problem: Constipation.  Plan: Resolved . Would c/w Miralax and lactulose. Enema PRN.      Problem/Plan - 4:  ·  Problem: Seizure disorder.  Plan: c/w Keppra and Carbamazepine.      Problem/Plan - 5:  ·  Problem: Cerebral palsy.  Plan: Hx of cerebral palsy with intellectual disability as well as paraplegia (wheelchair bound). No acute changes. Continue to monitor behavioral health.      Problem/Plan - 6:  Problem: Prophylactic measure. Plan: IMPROVE VTE Individual Risk Assessment, Score = 2, c/w HSQ for DVT ppx
52 year old female with cerebral palsy, intellectual disability, seizure disorder, constipation presented to the ED for medical evaluation of urinary retention, constipation, and abdominal distention. From group home.     WBC WNL. Urine and blood cxs negative.     CT A/P demonstrating no acute findings - thickened endometrium. Cazares was placed, but now removed.     Right arm with worsening erythema, warmth, and a few bullous lesions appears fluid filled.     Recommend:  -Currently off antibiotics.  -Continue to monitor temperature curve off antibiotics.  -Would consider derm evaluation for right arm lesions.    iHginio Eckert MD  Pager [phone]  After 5pm/weekends call [phone]

## 2018-05-08 ENCOUNTER — INPATIENT (INPATIENT)
Facility: HOSPITAL | Age: 53
LOS: 2 days | Discharge: NURSING, REHAB OR LONG-TERM CARE | End: 2018-05-11
Attending: INTERNAL MEDICINE | Admitting: INTERNAL MEDICINE
Payer: MEDICARE

## 2018-05-08 VITALS
DIASTOLIC BLOOD PRESSURE: 70 MMHG | RESPIRATION RATE: 16 BRPM | SYSTOLIC BLOOD PRESSURE: 143 MMHG | HEART RATE: 77 BPM | OXYGEN SATURATION: 95 %

## 2018-05-08 DIAGNOSIS — R56.9 UNSPECIFIED CONVULSIONS: ICD-10-CM

## 2018-05-08 DIAGNOSIS — K59.00 CONSTIPATION, UNSPECIFIED: ICD-10-CM

## 2018-05-08 DIAGNOSIS — G80.9 CEREBRAL PALSY, UNSPECIFIED: ICD-10-CM

## 2018-05-08 DIAGNOSIS — I25.10 ATHEROSCLEROTIC HEART DISEASE OF NATIVE CORONARY ARTERY WITHOUT ANGINA PECTORIS: ICD-10-CM

## 2018-05-08 LAB
ALBUMIN SERPL ELPH-MCNC: 3.8 G/DL — SIGNIFICANT CHANGE UP (ref 3.3–5)
ALP SERPL-CCNC: 109 U/L — SIGNIFICANT CHANGE UP (ref 40–120)
ALT FLD-CCNC: 28 U/L — SIGNIFICANT CHANGE UP (ref 4–33)
APAP SERPL-MCNC: < 15 UG/ML — LOW (ref 15–25)
APPEARANCE UR: CLEAR — SIGNIFICANT CHANGE UP
AST SERPL-CCNC: 25 U/L — SIGNIFICANT CHANGE UP (ref 4–32)
BACTERIA # UR AUTO: SIGNIFICANT CHANGE UP
BASE EXCESS BLDV CALC-SCNC: 14.8 MMOL/L — SIGNIFICANT CHANGE UP
BASE EXCESS BLDV CALC-SCNC: 9.5 MMOL/L — SIGNIFICANT CHANGE UP
BASOPHILS # BLD AUTO: 0.01 K/UL — SIGNIFICANT CHANGE UP (ref 0–0.2)
BASOPHILS NFR BLD AUTO: 0.2 % — SIGNIFICANT CHANGE UP (ref 0–2)
BILIRUB SERPL-MCNC: < 0.2 MG/DL — LOW (ref 0.2–1.2)
BILIRUB UR-MCNC: NEGATIVE — SIGNIFICANT CHANGE UP
BLOOD GAS VENOUS - CREATININE: 0.79 MG/DL — SIGNIFICANT CHANGE UP (ref 0.5–1.3)
BLOOD UR QL VISUAL: NEGATIVE — SIGNIFICANT CHANGE UP
BUN SERPL-MCNC: 23 MG/DL — SIGNIFICANT CHANGE UP (ref 7–23)
BUN SERPL-MCNC: 24 MG/DL — HIGH (ref 7–23)
CALCIUM SERPL-MCNC: 8.8 MG/DL — SIGNIFICANT CHANGE UP (ref 8.4–10.5)
CALCIUM SERPL-MCNC: 9.9 MG/DL — SIGNIFICANT CHANGE UP (ref 8.4–10.5)
CARBAMAZEPINE SERPL-MCNC: 13.9 UG/ML — HIGH (ref 4–12)
CHLORIDE BLDV-SCNC: 102 MMOL/L — SIGNIFICANT CHANGE UP (ref 96–108)
CHLORIDE SERPL-SCNC: 104 MMOL/L — SIGNIFICANT CHANGE UP (ref 98–107)
CHLORIDE SERPL-SCNC: 99 MMOL/L — SIGNIFICANT CHANGE UP (ref 98–107)
CO2 SERPL-SCNC: 33 MMOL/L — HIGH (ref 22–31)
CO2 SERPL-SCNC: 37 MMOL/L — HIGH (ref 22–31)
COLOR SPEC: YELLOW — SIGNIFICANT CHANGE UP
CREAT SERPL-MCNC: 0.71 MG/DL — SIGNIFICANT CHANGE UP (ref 0.5–1.3)
CREAT SERPL-MCNC: 0.82 MG/DL — SIGNIFICANT CHANGE UP (ref 0.5–1.3)
EOSINOPHIL # BLD AUTO: 0.2 K/UL — SIGNIFICANT CHANGE UP (ref 0–0.5)
EOSINOPHIL NFR BLD AUTO: 4 % — SIGNIFICANT CHANGE UP (ref 0–6)
ETHANOL BLD-MCNC: < 10 MG/DL — SIGNIFICANT CHANGE UP
GAS PNL BLDV: 145 MMOL/L — SIGNIFICANT CHANGE UP (ref 136–146)
GAS PNL BLDV: 146 MMOL/L — SIGNIFICANT CHANGE UP (ref 136–146)
GLUCOSE BLDC GLUCOMTR-MCNC: 127 MG/DL — HIGH (ref 70–99)
GLUCOSE BLDC GLUCOMTR-MCNC: 84 MG/DL — SIGNIFICANT CHANGE UP (ref 70–99)
GLUCOSE BLDV-MCNC: 80 — SIGNIFICANT CHANGE UP (ref 70–99)
GLUCOSE BLDV-MCNC: 99 — SIGNIFICANT CHANGE UP (ref 70–99)
GLUCOSE SERPL-MCNC: 77 MG/DL — SIGNIFICANT CHANGE UP (ref 70–99)
GLUCOSE SERPL-MCNC: 94 MG/DL — SIGNIFICANT CHANGE UP (ref 70–99)
GLUCOSE UR-MCNC: NEGATIVE — SIGNIFICANT CHANGE UP
HCO3 BLDV-SCNC: 32 MMOL/L — HIGH (ref 20–27)
HCO3 BLDV-SCNC: 35 MMOL/L — HIGH (ref 20–27)
HCT VFR BLD CALC: 37.1 % — SIGNIFICANT CHANGE UP (ref 34.5–45)
HCT VFR BLD CALC: 38.5 % — SIGNIFICANT CHANGE UP (ref 34.5–45)
HCT VFR BLDV CALC: 34.4 % — LOW (ref 34.5–45)
HCT VFR BLDV CALC: 37.6 % — SIGNIFICANT CHANGE UP (ref 34.5–45)
HGB BLD-MCNC: 11.4 G/DL — LOW (ref 11.5–15.5)
HGB BLD-MCNC: 11.9 G/DL — SIGNIFICANT CHANGE UP (ref 11.5–15.5)
HGB BLDV-MCNC: 11.1 G/DL — LOW (ref 11.5–15.5)
HGB BLDV-MCNC: 12.2 G/DL — SIGNIFICANT CHANGE UP (ref 11.5–15.5)
IMM GRANULOCYTES # BLD AUTO: 0.05 # — SIGNIFICANT CHANGE UP
IMM GRANULOCYTES NFR BLD AUTO: 1 % — SIGNIFICANT CHANGE UP (ref 0–1.5)
KETONES UR-MCNC: SIGNIFICANT CHANGE UP
LACTATE BLDV-MCNC: 1.4 MMOL/L — SIGNIFICANT CHANGE UP (ref 0.5–2)
LACTATE SERPL-SCNC: 0.8 MMOL/L — SIGNIFICANT CHANGE UP (ref 0.5–2)
LACTATE SERPL-SCNC: 1.3 MMOL/L — SIGNIFICANT CHANGE UP (ref 0.5–2)
LEUKOCYTE ESTERASE UR-ACNC: NEGATIVE — SIGNIFICANT CHANGE UP
LIDOCAIN IGE QN: 29.3 U/L — SIGNIFICANT CHANGE UP (ref 7–60)
LYMPHOCYTES # BLD AUTO: 1.35 K/UL — SIGNIFICANT CHANGE UP (ref 1–3.3)
LYMPHOCYTES # BLD AUTO: 27.2 % — SIGNIFICANT CHANGE UP (ref 13–44)
MCHC RBC-ENTMCNC: 30.7 % — LOW (ref 32–36)
MCHC RBC-ENTMCNC: 30.9 % — LOW (ref 32–36)
MCHC RBC-ENTMCNC: 32.9 PG — SIGNIFICANT CHANGE UP (ref 27–34)
MCHC RBC-ENTMCNC: 33.3 PG — SIGNIFICANT CHANGE UP (ref 27–34)
MCV RBC AUTO: 107.2 FL — HIGH (ref 80–100)
MCV RBC AUTO: 107.8 FL — HIGH (ref 80–100)
MONOCYTES # BLD AUTO: 0.43 K/UL — SIGNIFICANT CHANGE UP (ref 0–0.9)
MONOCYTES NFR BLD AUTO: 8.7 % — SIGNIFICANT CHANGE UP (ref 2–14)
MUCOUS THREADS # UR AUTO: SIGNIFICANT CHANGE UP
NEUTROPHILS # BLD AUTO: 2.92 K/UL — SIGNIFICANT CHANGE UP (ref 1.8–7.4)
NEUTROPHILS NFR BLD AUTO: 58.9 % — SIGNIFICANT CHANGE UP (ref 43–77)
NITRITE UR-MCNC: NEGATIVE — SIGNIFICANT CHANGE UP
NRBC # FLD: 0.02 — SIGNIFICANT CHANGE UP
NRBC # FLD: 0.02 — SIGNIFICANT CHANGE UP
PCO2 BLDV: 61 MMHG — HIGH (ref 41–51)
PCO2 BLDV: 79 MMHG — HIGH (ref 41–51)
PH BLDV: 7.34 PH — SIGNIFICANT CHANGE UP (ref 7.32–7.43)
PH BLDV: 7.37 PH — SIGNIFICANT CHANGE UP (ref 7.32–7.43)
PH UR: 6 — SIGNIFICANT CHANGE UP (ref 4.6–8)
PLATELET # BLD AUTO: 115 K/UL — LOW (ref 150–400)
PLATELET # BLD AUTO: 130 K/UL — LOW (ref 150–400)
PMV BLD: 10.7 FL — SIGNIFICANT CHANGE UP (ref 7–13)
PMV BLD: 10.8 FL — SIGNIFICANT CHANGE UP (ref 7–13)
PO2 BLDV: 29 MMHG — LOW (ref 35–40)
PO2 BLDV: 67 MMHG — HIGH (ref 35–40)
POTASSIUM BLDV-SCNC: 4 MMOL/L — SIGNIFICANT CHANGE UP (ref 3.4–4.5)
POTASSIUM BLDV-SCNC: 4.1 MMOL/L — SIGNIFICANT CHANGE UP (ref 3.4–4.5)
POTASSIUM SERPL-MCNC: 3.9 MMOL/L — SIGNIFICANT CHANGE UP (ref 3.5–5.3)
POTASSIUM SERPL-MCNC: 4.1 MMOL/L — SIGNIFICANT CHANGE UP (ref 3.5–5.3)
POTASSIUM SERPL-SCNC: 3.9 MMOL/L — SIGNIFICANT CHANGE UP (ref 3.5–5.3)
POTASSIUM SERPL-SCNC: 4.1 MMOL/L — SIGNIFICANT CHANGE UP (ref 3.5–5.3)
PROT SERPL-MCNC: 7.9 G/DL — SIGNIFICANT CHANGE UP (ref 6–8.3)
PROT UR-MCNC: 30 MG/DL — HIGH
RBC # BLD: 3.46 M/UL — LOW (ref 3.8–5.2)
RBC # BLD: 3.57 M/UL — LOW (ref 3.8–5.2)
RBC # FLD: 14 % — SIGNIFICANT CHANGE UP (ref 10.3–14.5)
RBC # FLD: 14 % — SIGNIFICANT CHANGE UP (ref 10.3–14.5)
RBC CASTS # UR COMP ASSIST: SIGNIFICANT CHANGE UP (ref 0–?)
SALICYLATES SERPL-MCNC: < 5 MG/DL — LOW (ref 15–30)
SAO2 % BLDV: 41.3 % — LOW (ref 60–85)
SAO2 % BLDV: 92.5 % — HIGH (ref 60–85)
SODIUM SERPL-SCNC: 146 MMOL/L — HIGH (ref 135–145)
SODIUM SERPL-SCNC: 146 MMOL/L — HIGH (ref 135–145)
SP GR SPEC: 1.03 — SIGNIFICANT CHANGE UP (ref 1–1.04)
SQUAMOUS # UR AUTO: SIGNIFICANT CHANGE UP
TSH SERPL-MCNC: 3.53 UIU/ML — SIGNIFICANT CHANGE UP (ref 0.27–4.2)
UROBILINOGEN FLD QL: NORMAL MG/DL — SIGNIFICANT CHANGE UP
VALPROATE SERPL-MCNC: 72.7 UG/ML — SIGNIFICANT CHANGE UP (ref 50–100)
WBC # BLD: 4.13 K/UL — SIGNIFICANT CHANGE UP (ref 3.8–10.5)
WBC # BLD: 4.96 K/UL — SIGNIFICANT CHANGE UP (ref 3.8–10.5)
WBC # FLD AUTO: 4.13 K/UL — SIGNIFICANT CHANGE UP (ref 3.8–10.5)
WBC # FLD AUTO: 4.96 K/UL — SIGNIFICANT CHANGE UP (ref 3.8–10.5)
WBC UR QL: SIGNIFICANT CHANGE UP (ref 0–?)

## 2018-05-08 PROCEDURE — 71045 X-RAY EXAM CHEST 1 VIEW: CPT | Mod: 26

## 2018-05-08 PROCEDURE — 93010 ELECTROCARDIOGRAM REPORT: CPT

## 2018-05-08 PROCEDURE — 99223 1ST HOSP IP/OBS HIGH 75: CPT | Mod: GC

## 2018-05-08 PROCEDURE — 70450 CT HEAD/BRAIN W/O DYE: CPT | Mod: 26

## 2018-05-08 RX ORDER — PANTOPRAZOLE SODIUM 20 MG/1
40 TABLET, DELAYED RELEASE ORAL
Refills: 0 | Status: DISCONTINUED | OUTPATIENT
Start: 2018-05-08 | End: 2018-05-11

## 2018-05-08 RX ORDER — ENOXAPARIN SODIUM 100 MG/ML
40 INJECTION SUBCUTANEOUS EVERY 24 HOURS
Refills: 0 | Status: DISCONTINUED | OUTPATIENT
Start: 2018-05-08 | End: 2018-05-11

## 2018-05-08 RX ORDER — FLUTICASONE PROPIONATE 50 MCG
1 SPRAY, SUSPENSION NASAL
Refills: 0 | Status: DISCONTINUED | OUTPATIENT
Start: 2018-05-08 | End: 2018-05-11

## 2018-05-08 RX ORDER — NYSTATIN CREAM 100000 [USP'U]/G
1 CREAM TOPICAL
Refills: 0 | Status: DISCONTINUED | OUTPATIENT
Start: 2018-05-08 | End: 2018-05-11

## 2018-05-08 RX ORDER — ASPIRIN/CALCIUM CARB/MAGNESIUM 324 MG
81 TABLET ORAL DAILY
Refills: 0 | Status: DISCONTINUED | OUTPATIENT
Start: 2018-05-08 | End: 2018-05-09

## 2018-05-08 RX ORDER — ALBUTEROL 90 UG/1
2 AEROSOL, METERED ORAL EVERY 6 HOURS
Refills: 0 | Status: DISCONTINUED | OUTPATIENT
Start: 2018-05-08 | End: 2018-05-11

## 2018-05-08 RX ORDER — VALPROIC ACID (AS SODIUM SALT) 250 MG/5ML
500 SOLUTION, ORAL ORAL
Refills: 0 | Status: DISCONTINUED | OUTPATIENT
Start: 2018-05-08 | End: 2018-05-11

## 2018-05-08 RX ORDER — BENZTROPINE MESYLATE 1 MG
0.5 TABLET ORAL
Refills: 0 | Status: DISCONTINUED | OUTPATIENT
Start: 2018-05-08 | End: 2018-05-11

## 2018-05-08 RX ORDER — OLANZAPINE 15 MG/1
7.5 TABLET, FILM COATED ORAL AT BEDTIME
Refills: 0 | Status: DISCONTINUED | OUTPATIENT
Start: 2018-05-08 | End: 2018-05-11

## 2018-05-08 RX ORDER — LEVETIRACETAM 250 MG/1
1000 TABLET, FILM COATED ORAL EVERY 12 HOURS
Refills: 0 | Status: DISCONTINUED | OUTPATIENT
Start: 2018-05-08 | End: 2018-05-09

## 2018-05-08 RX ORDER — LEVETIRACETAM 250 MG/1
1000 TABLET, FILM COATED ORAL ONCE
Refills: 0 | Status: COMPLETED | OUTPATIENT
Start: 2018-05-08 | End: 2018-05-08

## 2018-05-08 RX ORDER — FOLIC ACID 0.8 MG
1 TABLET ORAL DAILY
Refills: 0 | Status: DISCONTINUED | OUTPATIENT
Start: 2018-05-08 | End: 2018-05-11

## 2018-05-08 RX ORDER — FERROUS SULFATE 325(65) MG
325 TABLET ORAL DAILY
Refills: 0 | Status: DISCONTINUED | OUTPATIENT
Start: 2018-05-08 | End: 2018-05-11

## 2018-05-08 RX ORDER — VALPROIC ACID (AS SODIUM SALT) 250 MG/5ML
1200 SOLUTION, ORAL ORAL ONCE
Refills: 0 | Status: COMPLETED | OUTPATIENT
Start: 2018-05-08 | End: 2018-05-08

## 2018-05-08 RX ORDER — CARBAMAZEPINE 200 MG
500 TABLET ORAL ONCE
Refills: 0 | Status: DISCONTINUED | OUTPATIENT
Start: 2018-05-08 | End: 2018-05-08

## 2018-05-08 RX ORDER — THIAMINE MONONITRATE (VIT B1) 100 MG
100 TABLET ORAL DAILY
Refills: 0 | Status: DISCONTINUED | OUTPATIENT
Start: 2018-05-08 | End: 2018-05-11

## 2018-05-08 RX ORDER — LEVETIRACETAM 250 MG/1
1000 TABLET, FILM COATED ORAL
Refills: 0 | Status: DISCONTINUED | OUTPATIENT
Start: 2018-05-08 | End: 2018-05-08

## 2018-05-08 RX ORDER — SODIUM CHLORIDE 9 MG/ML
1000 INJECTION INTRAMUSCULAR; INTRAVENOUS; SUBCUTANEOUS ONCE
Refills: 0 | Status: COMPLETED | OUTPATIENT
Start: 2018-05-08 | End: 2018-05-08

## 2018-05-08 RX ORDER — FUROSEMIDE 40 MG
20 TABLET ORAL ONCE
Refills: 0 | Status: COMPLETED | OUTPATIENT
Start: 2018-05-08 | End: 2018-05-08

## 2018-05-08 RX ORDER — CARBAMAZEPINE 200 MG
500 TABLET ORAL ONCE
Refills: 0 | Status: COMPLETED | OUTPATIENT
Start: 2018-05-08 | End: 2018-05-08

## 2018-05-08 RX ADMIN — Medication 56 MILLIGRAM(S): at 13:36

## 2018-05-08 RX ADMIN — Medication 20 MILLIGRAM(S): at 19:53

## 2018-05-08 RX ADMIN — LEVETIRACETAM 400 MILLIGRAM(S): 250 TABLET, FILM COATED ORAL at 23:10

## 2018-05-08 RX ADMIN — Medication 27.5 MILLIGRAM(S): at 19:01

## 2018-05-08 RX ADMIN — LEVETIRACETAM 400 MILLIGRAM(S): 250 TABLET, FILM COATED ORAL at 11:56

## 2018-05-08 RX ADMIN — LEVETIRACETAM 400 MILLIGRAM(S): 250 TABLET, FILM COATED ORAL at 09:43

## 2018-05-08 RX ADMIN — Medication 1 MILLIGRAM(S): at 11:36

## 2018-05-08 RX ADMIN — Medication 1 SPRAY(S): at 23:10

## 2018-05-08 RX ADMIN — NYSTATIN CREAM 1 APPLICATION(S): 100000 CREAM TOPICAL at 23:10

## 2018-05-08 RX ADMIN — SODIUM CHLORIDE 1000 MILLILITER(S): 9 INJECTION INTRAMUSCULAR; INTRAVENOUS; SUBCUTANEOUS at 08:20

## 2018-05-08 NOTE — H&P ADULT - NSHPLABSRESULTS_GEN_ALL_CORE
11.4   4.13  )-----------( 115      ( 08 May 2018 12:50 )             37.1    05-08    146<H>  |  104  |  23  ----------------------------<  77  3.9   |  33<H>  |  0.71    Ca    8.8      08 May 2018 12:50    TPro  7.9  /  Alb  3.8  /  TBili  < 0.2<L>  /  DBili  x   /  AST  25  /  ALT  28  /  AlkPhos  109  05-08    < from: CT Head No Cont (05.08.18 @ 07:38) >    No acute intracranial pathology. Follow-up MRI may be considered for   further evaluation as clinically indicated.    < end of copied text >

## 2018-05-08 NOTE — ED PROVIDER NOTE - PHYSICAL EXAMINATION
b/l UE and Le contractures but has some ROM (at baseline). 1+ LE edema (at baseline).   stage 1 sacral ulcer.   CARON ahumada chaperone: 96.3 temp. +soft brown stool, no impaction.  abd: firm, nontender (pt would usually grimace if it hurt), no rebound/guarding.

## 2018-05-08 NOTE — H&P ADULT - PROBLEM SELECTOR PLAN 1
- pt came to hospital after 4 seizure episodes yesterday at group home, was being treated with carbmazepine 400,500 and keppra 750 bid with carbamazepine level this AM elevated  - unknown reason for seizure as last known seizure was 3 months ago, no traumatic event, no hypoglycemia, pt compliant with medication, CT head negative for any event, possible in setting of hypothermia  - pt had RRT on floors for AMS, withdrawing for painful stimuli, O2 sat on 100, being transferred to [address] [place] [address] for continuous pulse oximetry, on exam upper airway sounds possible TATYANA, tasha revaluate when patient more alert  - neurology following, pt undergoing depakote load 15 mg/kg then depakote 500 mg bid  - will f/u valproic acid level  - no indication for VEEG at this time, pt has known seizure disorder

## 2018-05-08 NOTE — ED ADULT NURSE NOTE - OBJECTIVE STATEMENT
Patient arrives to ED awake and alert. Hx of cerebral palsy, seizure disorder, and developmentally delayed. Patient nonverbal, nonambulatory. Patient health care aid states patient has had 4 seizures in 24 hours. No acute distress at this time, patient comes from assisted living facility. Bilateral pedal edema observed, health care aid states this is patient's normal. MD at bedside.    Unable to complete SI/HI due to medical condition. Patient arrives to ED awake and alert. Hx of cerebral palsy, seizure disorder, and developmentally delayed. Patient nonverbal, nonambulatory. Patient health care aid states patient has had 4 seizures in 24 hours. No acute distress at this time, patient comes from assisted living facility. Bilateral pedal edema observed, health care aid states this is patient's normal. MD at bedside.    Unable to complete SI/HI due to medical condition.    Addendum:  20G IV saline lock placed via US guidance by MD Edmondson, labs drawn and sent as ordered.  Multiple stage 2 ulcers noted to pt's R and L buttocks, protective barrier applied to wounds, kishan care provided and pt.'s diaper removed.

## 2018-05-08 NOTE — H&P ADULT - NSHPPHYSICALEXAM_GEN_ALL_CORE
General: WN/WD NAD  Neurology: A&Ox1, nonfocal, very difficult to arouse   Eyes: PERRLA/ EOMI, Gross vision intact  ENT/Neck: Neck supple, trachea midline, No JVD, Gross hearing intact  Respiratory: No wheezing, crackles however upper airway sounds are present   CV: RRR, S1S2, no murmurs, rubs or gallops  Abdominal: Soft, NT, ND +BS,   Extremities: No edema, + peripheral pulses  Skin: No Rashes, Hematoma, Ecchymosis  Incisions:   Tubes:

## 2018-05-08 NOTE — CHART NOTE - NSCHARTNOTEFT_GEN_A_CORE
RRT called at 12:24PM for AMS.     Briefly, patient is a 53F w/ hx of cerebral palsy and seizure disorder who presents with a RRT called at 12:24PM for AMS.     Briefly, patient is a 53F w/ hx of seizure disorder and cerebral palsy who presented earlier today with 4 witnessesed seizures at her assisted living facility. On admission, pt was given 1mg of Ativan as well as 1000mg of Keppra, and her ED course was complicated by another seizure. As per nursing staff, pt had b/l upper and lower extremity shaking and her eyes rolled back and this episode lasted approximately 10 seconds. At this time, patient was given another 1mg of Ativan as well as 1000mg of Keppra.     On arrival to the medicine floor, pt was lethargic so an RRT was called. Vital signs: T: 97.8, BP: 100s/60s, HR: 90s and O2 sat: 100% on RA. On exam, pt is lethargic but withdrawing from painful stimuli.     Pt is likely post-ictal from recent seizure and lethargic given recent administration of Ativan. FS wnl. She is protecting her airway without any concerns. CT head earlier today did not show any acute intracranial process.     Plan:   - pt to be transferred to floor with continuous pulse ox monitoring   - infectious workup w/ Ua/Ucx and blood cultures. CXR without focal opacities   - discussed with neurology, plan to start load w/ Depakote and start 500mg BID as well as Keppra   - check Depakote level at 3pm  - 1000mg BID as pt unable to tolerate po meds at this time     Plan discussed extensively with Team 4 who will assume care of patient at this time[place]   [id number]

## 2018-05-08 NOTE — H&P ADULT - NSHPSOCIALHISTORY_GEN_ALL_CORE
The patient has had cerebral palsy and intellectual disability requiring her to live in a group home at [facility]

## 2018-05-08 NOTE — ED PROVIDER NOTE - ATTENDING CONTRIBUTION TO CARE
53F PMH CP, seizures, p/w breakthrough seizures since yesterday. Pt currently interacting like normal self. Slight hypothermia, other vitals wnl, exam as above. Appears at baseline.   ddx: Possible metabolic or infectious component  stat FSG. CBC, cmp, vbg comp, lipase, blood cx, ua, urine cx. CXR. CTH. Reassess.

## 2018-05-08 NOTE — CONSULT NOTE ADULT - ASSESSMENT
Pt is a 52 y/o F with CP and past history of seizures presenting with seizures x4 that occurred yesterday and have since resolved. Pt is currently stable and almost back to baseline functioning, as per aide, but sleepier . Vitals and labs are significant for hypothermia and increased TSH. On exam, pt is awakens to repeated stimuli, and shows no clinical signs of seizure activity.     1. Seizures 2/2 to unknown etiology presumed to be 2/2 hypothermia   - s/p Ativan 1 mg IV given multiple seizure activity   - Keppra 1 g IV, c/w home dose CMZ   - admission for observation  - pulse oximetry monitoring at bedside Pt is a 52 y/o F with CP and past history of seizures presenting with seizures x4 that occurred yesterday and have since resolved. Pt is currently stable and almost back to baseline functioning, as per aide, but sleepier . Vitals and labs are significant for hypothermia and increased TSH. On exam, pt is awakens to repeated stimuli, and shows no clinical signs of seizure activity.     1. Seizures 2/2 to unknown etiology presumed to be 2/2 hypothermia   - s/p Ativan 1 mg IV given multiple seizure activity   - s/p Keppra 1 g IV load, given extra Keppra 1 g IV given repeat seizure   - Depakote 15 mg/kg load x1 IV, then standing Depakote 500 mg bid   - admission for observation  - pulse oximetry monitoring at bedside

## 2018-05-08 NOTE — ED ADULT NURSE NOTE - CHIEF COMPLAINT QUOTE
pt BIBEMS from [facility] accompanied by staff. per staff pt had 5-6 seizures since 3pm. PMH- Seizures, Non-verbal, CP, Developmental Disability. per staff pt can get aggressive at times. pt on Cogentin, Tegretol, Zyprexa. unable to obtain temp in triage.

## 2018-05-08 NOTE — CONSULT NOTE ADULT - SUBJECTIVE AND OBJECTIVE BOX
NEUROLOGY CONSULT     Patient is a  52 y/o F with CP and past history of seizures presenting for seizure activity noticed at her outpatient assisted living facility. Pt had 2 seizure during march but none since then. Now since yesterday had total of 4 episodes of witnessed seizures (eyes rolling back, b/l UE and LE shaking), lasting <1min. +Fecal incontinence.     HPI:      PAST MEDICAL & SURGICAL HISTORY:  Constipation  Seizure disorder  Cerebral palsy  No significant past surgical history      Allergies    Topamax (Unknown)    Intolerances        MEDICATIONS  (STANDING):  carBAMazepine 500 milliGRAM(s) Oral Once  levETIRAcetam  IVPB 1000 milliGRAM(s) IV Intermittent once  LORazepam   Injectable 1 milliGRAM(s) IV Push Once    MEDICATIONS  (PRN):      SOCIAL HISTORY: Denies tobacco/EtOH/drug use     FAMILY HISTORY:  No pertinent family history in first degree relatives      REVIEW OF SYSTEMS:  CONSTITUTIONAL:  No weight loss, fever, chills, weakness or fatigue.  HEENT:  Eyes:  No visual loss, blurred vision, double vision or yellow sclerae. Ears, Nose, Throat:  No hearing loss, sneezing, congestion, runny nose or sore throat.  SKIN:  No rash or itching.  CARDIOVASCULAR:  No chest pain, chest pressure or chest discomfort. No palpitations or edema.  RESPIRATORY:  No shortness of breath, cough or sputum.  GASTROINTESTINAL:  No anorexia, nausea, vomiting or diarrhea. No abdominal pain or blood.  GENITOURINARY:  denies incontinence/retention   NEUROLOGICAL:  No headache, dizziness, syncope, paralysis, ataxia, numbness or tingling in the extremities. No change in bowel or bladder control.  MUSCULOSKELETAL:  No muscle, back pain, joint pain or stiffness.  HEMATOLOGIC:  No anemia, bleeding or bruising.  LYMPHATICS:  No enlarged nodes. No history of splenectomy.  PSYCHIATRIC:  No history of depression or anxiety.  ENDOCRINOLOGIC:  No reports of sweating, cold or heat intolerance. No polyuria or polydipsia.      Vital Signs Last 24 Hrs  T(C): 35.7 (08 May 2018 06:03), Max: 35.7 (08 May 2018 06:03)  T(F): 96.3 (08 May 2018 06:03), Max: 96.3 (08 May 2018 06:03)  HR: 68 (08 May 2018 08:10) (68 - 77)  BP: 92/55 (08 May 2018 08:10) (92/55 - 143/70)  BP(mean): --  RR: 18 (08 May 2018 08:10) (16 - 18)  SpO2: 98% (08 May 2018 08:10) (95% - 98%)    PHYSICAL EXAM:   General appearance: No acute distress                 Mental Status: AAOx3, fluent speech, follows simple commands, able to name  Cranial Nerves: EOMI, PERRL, VFF, V1-V3 intact, facial symmetric,  tongue midline  Motor: strength 5/5 throughout. No drift x4  Sensation: Intact to LT throughout  Coordination: FTN intact b/l  Reflexes: 1+ bilateral biceps, brachioradialis, patellar and ankle  Gait: normal and stable.      LABS:                          11.9   4.96  )-----------( 130      ( 08 May 2018 06:45 )             38.5     05-08    146<H>  |  99  |  24<H>  ----------------------------<  94  4.1   |  37<H>  |  0.82    Ca    9.9      08 May 2018 06:45    TPro  7.9  /  Alb  3.8  /  TBili  < 0.2<L>  /  DBili  x   /  AST  25  /  ALT  28  /  AlkPhos  109  05-08      IMAGING: NEUROLOGY CONSULT     HPI:    Patient is a  54 y/o F with CP and past history of seizures presenting for seizure activity noticed at her outpatient assisted living facility. Pt had 2 seizure during march but none since then. Now since yesterday had total of 4 episodes of witnessed seizures (eyes rolling back, b/l UE and LE shaking), lasting <1min. +Fecal incontinence. Pt is significantly cognitively impaired at baseline and nonambulatory with minimal response to commands.           PAST MEDICAL & SURGICAL HISTORY:  Constipation  Seizure disorder  Cerebral palsy  No significant past surgical history      Allergies    Topamax (Unknown)    Intolerances        MEDICATIONS  (STANDING):  carBAMazepine 500 milliGRAM(s) Oral Once  levETIRAcetam  IVPB 1000 milliGRAM(s) IV Intermittent once  LORazepam   Injectable 1 milliGRAM(s) IV Push Once    MEDICATIONS  (PRN):      SOCIAL HISTORY: Denies tobacco/EtOH/drug use     FAMILY HISTORY:  No pertinent family history in first degree relatives      REVIEW OF SYSTEMS:  CONSTITUTIONAL:  No weight loss, fever, chills, weakness or fatigue.  HEENT:  Eyes:  No visual loss, blurred vision, double vision or yellow sclerae. Ears, Nose, Throat:  No hearing loss, sneezing, congestion, runny nose or sore throat.  SKIN:  No rash or itching.  CARDIOVASCULAR:  No chest pain, chest pressure or chest discomfort. No palpitations or edema.  RESPIRATORY:  No shortness of breath, cough or sputum.  GASTROINTESTINAL:  No anorexia, nausea, vomiting or diarrhea. No abdominal pain or blood.  GENITOURINARY:  denies incontinence/retention   NEUROLOGICAL:  No headache, dizziness, syncope, paralysis, ataxia, numbness or tingling in the extremities. No change in bowel or bladder control.  MUSCULOSKELETAL:  No muscle, back pain, joint pain or stiffness.  HEMATOLOGIC:  No anemia, bleeding or bruising.  LYMPHATICS:  No enlarged nodes. No history of splenectomy.  PSYCHIATRIC:  No history of depression or anxiety.  ENDOCRINOLOGIC:  No reports of sweating, cold or heat intolerance. No polyuria or polydipsia.      Vital Signs Last 24 Hrs  T(C): 35.7 (08 May 2018 06:03), Max: 35.7 (08 May 2018 06:03)  T(F): 96.3 (08 May 2018 06:03), Max: 96.3 (08 May 2018 06:03)  HR: 68 (08 May 2018 08:10) (68 - 77)  BP: 92/55 (08 May 2018 08:10) (92/55 - 143/70)  BP(mean): --  RR: 18 (08 May 2018 08:10) (16 - 18)  SpO2: 98% (08 May 2018 08:10) (95% - 98%)    PHYSICAL EXAM:   General appearance: No acute distress                 Mental Status: Non-verbal, unable to follow commands  Cranial Nerves: PERRL,   Motor: strength: increased tone with contractures  Sensation: Unresponsive to painful stimuli in RUE, and BLE. Responsive in LUE  Coordination: Not assessed  Reflexes: 1+ bilateral biceps, brachioradialis, patellar and ankle  Gait: pt nonambulatory at baseline  LABS:                          11.9   4.96  )-----------( 130      ( 08 May 2018 06:45 )             38.5     05-08    146<H>  |  99  |  24<H>  ----------------------------<  94  4.1   |  37<H>  |  0.82    Ca    9.9      08 May 2018 06:45    TPro  7.9  /  Alb  3.8  /  TBili  < 0.2<L>  /  DBili  x   /  AST  25  /  ALT  28  /  AlkPhos  109  05-08      IMAGING: NEUROLOGY CONSULT     HPI:    Patient is a  52 y/o F with CP and past history of seizures presenting for seizure activity noticed at her outpatient assisted living facility. Pt had 2 seizure during march but none since then. Now since yesterday had total of 4 episodes of witnessed seizures (eyes rolling back, b/l UE and LE shaking), lasting <1min. +Fecal incontinence. Pt is significantly cognitively impaired at baseline and nonambulatory with minimal response to commands.     Home Meds:   - Keppra 750 BID   - /400           PAST MEDICAL & SURGICAL HISTORY:  Constipation  Seizure disorder  Cerebral palsy  No significant past surgical history      Allergies    Topamax (Unknown)    Intolerances        MEDICATIONS  (STANDING):  carBAMazepine 500 milliGRAM(s) Oral Once  levETIRAcetam  IVPB 1000 milliGRAM(s) IV Intermittent once  LORazepam   Injectable 1 milliGRAM(s) IV Push Once    MEDICATIONS  (PRN):        Vital Signs Last 24 Hrs  T(C): 35.7 (08 May 2018 06:03), Max: 35.7 (08 May 2018 06:03)  T(F): 96.3 (08 May 2018 06:03), Max: 96.3 (08 May 2018 06:03)  HR: 68 (08 May 2018 08:10) (68 - 77)  BP: 92/55 (08 May 2018 08:10) (92/55 - 143/70)  BP(mean): --  RR: 18 (08 May 2018 08:10) (16 - 18)  SpO2: 98% (08 May 2018 08:10) (95% - 98%)    PHYSICAL EXAM:   General appearance: No acute distress                 Mental Status: minimally awake, opens eyes to repeated verbal stimuli, but falls back asleep, non-verbal, unable to follow commands  Cranial Nerves: PERRL, resisting eye opening, unable to fully evaluate, face appears symmetric,   Motor: strength: increased tone with contractures b/l UEs w/ strong resistance b/l to examiner, b/l LEs no movement, knees flexed b/l   Sensation: Unresponsive to painful stimuli in RUE, and b/l LE. Responsive in LUE  Coordination: Not assessed  Reflexes: 1+ bilateral biceps, brachioradialis, patellar and ankle  Gait: pt nonambulatory at baseline    LABS:                          11.9   4.96  )-----------( 130      ( 08 May 2018 06:45 )             38.5     05-08    146<H>  |  99  |  24<H>  ----------------------------<  94  4.1   |  37<H>  |  0.82    Ca    9.9      08 May 2018 06:45    TPro  7.9  /  Alb  3.8  /  TBili  < 0.2<L>  /  DBili  x   /  AST  25  /  ALT  28  /  AlkPhos  109  05-08      IMAGING:

## 2018-05-08 NOTE — H&P ADULT - PROBLEM SELECTOR PLAN 3
- pt non verbal at baseline, reportedly able to grunt when she is in pain.  - will continue to monitor

## 2018-05-08 NOTE — H&P ADULT - ASSESSMENT
The patient is 54 yo F PMH of cerebral palsy, intellectual disability, constipation, known seizure disorder p/w seizure with last known seizure in 3/18 was on keppra 750 BID, carbamazepine 500am, 400qhs with CT head negative now s/p ativan 1 mg x 2, 1 g keppra x 2, undergoing depakaote load.

## 2018-05-08 NOTE — H&P ADULT - PROBLEM SELECTOR PLAN 2
- c/w ASA, holding lasix now in setting of relative hypotension, unclear if patient has underlying HF, no signs of volume overload on exam, pt has no orthopnea

## 2018-05-08 NOTE — H&P ADULT - HISTORY OF PRESENT ILLNESS
The patient is a 52 yo F PMH of cerebral palsy, intellectual disability, constipation, known seizure disorder p/w seizure. The patient's last known seizure was in March, none since that time. The nursing home reports 4 episodes of witnessed seizures (eyes rolling back with b/l UE and LE shaking) lasting less than 1 minute with fecal incontinence. At baseline the patient is non-verbal however is able to groan/yell when in pain. At home patient was taking carbamazepine and  Unable to obtain ROS.    In the ED:    Vital Signs: T 95.1, HR 68, BP 92/55  Labs: WBC 4.13, Hgb 11.4, Platelet count 115, Na+ 146, K+ 4.1, Cr 0.79, UA -, Carbamazepine 13.9,   Imaging: CT head negative, CXR shows possible left sided effusion  Meds: Given 1 mg ativan x 1 and 1000 mg of Keppra, in ED pt had another seizure with b/l upper and lower extremity shaking for which received 1 mg ativan and 1000 mg of keppra

## 2018-05-08 NOTE — ED PROVIDER NOTE - PROGRESS NOTE DETAILS
Neuro consulted; patient given ativan 1mg per neuro recommendation and given morning seizure medication (normally takes 750mg keppra, given 1g keppra per neuro recs, and given AM dose of carbamazepine 500mg). Patient's aid changing shift, new aid at bedside. Has patient's med sheet.

## 2018-05-08 NOTE — ED ADULT TRIAGE NOTE - CHIEF COMPLAINT QUOTE
pt BIBEMS from [facility] accompanied by staff. per staff pt had 5-6 seizures since 3pm. PMH- Seizures, Non-verbal, CP, Developmental Disability. per staff pt can get aggressive at times. pt on Cogentin, Tegretol, Zyprexa. unable to obtain temp in triage.

## 2018-05-09 ENCOUNTER — TRANSCRIPTION ENCOUNTER (OUTPATIENT)
Age: 53
End: 2018-05-09

## 2018-05-09 DIAGNOSIS — Z29.9 ENCOUNTER FOR PROPHYLACTIC MEASURES, UNSPECIFIED: ICD-10-CM

## 2018-05-09 LAB
BASOPHILS # BLD AUTO: 0.01 K/UL — SIGNIFICANT CHANGE UP (ref 0–0.2)
BASOPHILS NFR BLD AUTO: 0.2 % — SIGNIFICANT CHANGE UP (ref 0–2)
BUN SERPL-MCNC: 21 MG/DL — SIGNIFICANT CHANGE UP (ref 7–23)
CALCIUM SERPL-MCNC: 9.1 MG/DL — SIGNIFICANT CHANGE UP (ref 8.4–10.5)
CHLORIDE SERPL-SCNC: 102 MMOL/L — SIGNIFICANT CHANGE UP (ref 98–107)
CO2 SERPL-SCNC: 26 MMOL/L — SIGNIFICANT CHANGE UP (ref 22–31)
CREAT SERPL-MCNC: 0.74 MG/DL — SIGNIFICANT CHANGE UP (ref 0.5–1.3)
EOSINOPHIL # BLD AUTO: 0.25 K/UL — SIGNIFICANT CHANGE UP (ref 0–0.5)
EOSINOPHIL NFR BLD AUTO: 5.8 % — SIGNIFICANT CHANGE UP (ref 0–6)
GLUCOSE BLDC GLUCOMTR-MCNC: 70 MG/DL — SIGNIFICANT CHANGE UP (ref 70–99)
GLUCOSE BLDC GLUCOMTR-MCNC: 92 MG/DL — SIGNIFICANT CHANGE UP (ref 70–99)
GLUCOSE SERPL-MCNC: 54 MG/DL — LOW (ref 70–99)
HCT VFR BLD CALC: 37.5 % — SIGNIFICANT CHANGE UP (ref 34.5–45)
HGB BLD-MCNC: 11.7 G/DL — SIGNIFICANT CHANGE UP (ref 11.5–15.5)
IMM GRANULOCYTES # BLD AUTO: 0.03 # — SIGNIFICANT CHANGE UP
IMM GRANULOCYTES NFR BLD AUTO: 0.7 % — SIGNIFICANT CHANGE UP (ref 0–1.5)
LYMPHOCYTES # BLD AUTO: 2.07 K/UL — SIGNIFICANT CHANGE UP (ref 1–3.3)
LYMPHOCYTES # BLD AUTO: 48.1 % — HIGH (ref 13–44)
MCHC RBC-ENTMCNC: 31.2 % — LOW (ref 32–36)
MCHC RBC-ENTMCNC: 33.8 PG — SIGNIFICANT CHANGE UP (ref 27–34)
MCV RBC AUTO: 108.4 FL — HIGH (ref 80–100)
MONOCYTES # BLD AUTO: 0.41 K/UL — SIGNIFICANT CHANGE UP (ref 0–0.9)
MONOCYTES NFR BLD AUTO: 9.5 % — SIGNIFICANT CHANGE UP (ref 2–14)
NEUTROPHILS # BLD AUTO: 1.53 K/UL — LOW (ref 1.8–7.4)
NEUTROPHILS NFR BLD AUTO: 35.7 % — LOW (ref 43–77)
NRBC # FLD: 0 — SIGNIFICANT CHANGE UP
PLATELET # BLD AUTO: 104 K/UL — LOW (ref 150–400)
PMV BLD: 10.8 FL — SIGNIFICANT CHANGE UP (ref 7–13)
POTASSIUM SERPL-MCNC: 4.6 MMOL/L — SIGNIFICANT CHANGE UP (ref 3.5–5.3)
POTASSIUM SERPL-SCNC: 4.6 MMOL/L — SIGNIFICANT CHANGE UP (ref 3.5–5.3)
PROCALCITONIN SERPL-MCNC: <0.05 NG/ML — SIGNIFICANT CHANGE UP (ref 0–0.04)
RBC # BLD: 3.46 M/UL — LOW (ref 3.8–5.2)
RBC # FLD: 14.1 % — SIGNIFICANT CHANGE UP (ref 10.3–14.5)
SODIUM SERPL-SCNC: 145 MMOL/L — SIGNIFICANT CHANGE UP (ref 135–145)
SPECIMEN SOURCE: SIGNIFICANT CHANGE UP
SPECIMEN SOURCE: SIGNIFICANT CHANGE UP
WBC # BLD: 4.3 K/UL — SIGNIFICANT CHANGE UP (ref 3.8–10.5)
WBC # FLD AUTO: 4.3 K/UL — SIGNIFICANT CHANGE UP (ref 3.8–10.5)

## 2018-05-09 PROCEDURE — 71250 CT THORAX DX C-: CPT | Mod: 26

## 2018-05-09 PROCEDURE — 99232 SBSQ HOSP IP/OBS MODERATE 35: CPT | Mod: GC

## 2018-05-09 RX ORDER — SODIUM CHLORIDE 9 MG/ML
1000 INJECTION, SOLUTION INTRAVENOUS
Refills: 0 | Status: DISCONTINUED | OUTPATIENT
Start: 2018-05-09 | End: 2018-05-09

## 2018-05-09 RX ORDER — LEVETIRACETAM 250 MG/1
1000 TABLET, FILM COATED ORAL
Refills: 0 | Status: DISCONTINUED | OUTPATIENT
Start: 2018-05-09 | End: 2018-05-11

## 2018-05-09 RX ORDER — ASPIRIN/CALCIUM CARB/MAGNESIUM 324 MG
81 TABLET ORAL DAILY
Refills: 0 | Status: DISCONTINUED | OUTPATIENT
Start: 2018-05-09 | End: 2018-05-11

## 2018-05-09 RX ADMIN — Medication 1 SPRAY(S): at 18:24

## 2018-05-09 RX ADMIN — Medication 100 MILLIGRAM(S): at 13:57

## 2018-05-09 RX ADMIN — Medication 325 MILLIGRAM(S): at 13:56

## 2018-05-09 RX ADMIN — Medication 27.5 MILLIGRAM(S): at 05:24

## 2018-05-09 RX ADMIN — Medication 1 SPRAY(S): at 05:23

## 2018-05-09 RX ADMIN — Medication 1 MILLIGRAM(S): at 13:57

## 2018-05-09 RX ADMIN — Medication 81 MILLIGRAM(S): at 13:56

## 2018-05-09 RX ADMIN — Medication 27.5 MILLIGRAM(S): at 21:40

## 2018-05-09 RX ADMIN — Medication 1 TABLET(S): at 13:56

## 2018-05-09 RX ADMIN — LEVETIRACETAM 1000 MILLIGRAM(S): 250 TABLET, FILM COATED ORAL at 18:26

## 2018-05-09 RX ADMIN — LEVETIRACETAM 400 MILLIGRAM(S): 250 TABLET, FILM COATED ORAL at 11:00

## 2018-05-09 RX ADMIN — NYSTATIN CREAM 1 APPLICATION(S): 100000 CREAM TOPICAL at 05:24

## 2018-05-09 RX ADMIN — SODIUM CHLORIDE 100 MILLILITER(S): 9 INJECTION, SOLUTION INTRAVENOUS at 08:35

## 2018-05-09 RX ADMIN — Medication 0.5 MILLIGRAM(S): at 18:25

## 2018-05-09 RX ADMIN — ENOXAPARIN SODIUM 40 MILLIGRAM(S): 100 INJECTION SUBCUTANEOUS at 05:24

## 2018-05-09 RX ADMIN — OLANZAPINE 7.5 MILLIGRAM(S): 15 TABLET, FILM COATED ORAL at 22:39

## 2018-05-09 RX ADMIN — NYSTATIN CREAM 1 APPLICATION(S): 100000 CREAM TOPICAL at 18:26

## 2018-05-09 NOTE — DISCHARGE NOTE ADULT - MEDICATION SUMMARY - MEDICATIONS TO TAKE
I will START or STAY ON the medications listed below when I get home from the hospital:    Aspirin Enteric Coated 81 mg oral delayed release tablet  -- 1 tab(s) by mouth once a day  -- Indication: For CAD (coronary artery disease)    levETIRAcetam 1000 mg oral tablet  -- 1 tab(s) by mouth 2 times a day  -- Indication: For Seizure    valproic acid 250 mg oral capsule  -- 2 cap(s) by mouth 2 times a day  -- Indication: For Seizure    Cogentin 0.5 mg oral tablet  -- 1 tab(s) by mouth 2 times a day  -- Indication: For Seizure    OLANZapine 7.5 mg oral tablet  -- 1 tab(s) by mouth once a day (in the evening)  -- Indication: For Cerebral palsy    Ventolin HFA 90 mcg/inh inhalation aerosol  -- 2 puff(s) inhaled 4 times a day, As Needed  -- Indication: For Asthma    nystatin 100,000 units/g topical cream  -- Apply on skin to affected area once a day between toes  -- Indication: For Prophylactic measure    furosemide 40 mg oral tablet  -- 1 tab(s) by mouth once a day  -- Indication: For CAD (coronary artery disease)    ferrous sulfate 325 mg (65 mg elemental iron) oral tablet  -- 1 tab(s) by mouth once a day  -- Indication: For Supplement    lactulose 10 g/15 mL oral solution  -- 15 milliliter(s) by mouth 2 times a day  -- Indication: For Laxitive    senna 8.6 mg oral tablet  -- 2 tab(s) by mouth once a day (at bedtime)  -- Indication: For Constipation    Flonase 50 mcg/inh nasal spray  -- 1 spray(s) into nose 2 times a day  -- Indication: For Asthma    Artificial Tears ophthalmic ointment  -- 1 application to each affected eye once a day (in the morning)  -- Indication: For Prophylactic measure    Artificial Tears ophthalmic solution  -- 1 drop(s) to each affected eye 3 times a day  -- Indication: For Prophylactic measure    NexIUM 20 mg oral delayed release capsule  -- 1 cap(s) by mouth once a day  -- Indication: For GERD    Multiple Vitamins oral tablet  -- 1 tab(s) by mouth once a day  -- Indication: For Supplement    Calcium 600+D 600 mg-200 intl units oral tablet  -- 1 tab(s) by mouth 2 times a day  -- Indication: For vitamin    folic acid 1 mg oral tablet  -- 1 tab(s) by mouth once a day  -- Indication: For Supplement    Vitamin B1 100 mg oral tablet  -- 1 tab(s) by mouth once a day  -- Indication: For Supplement

## 2018-05-09 NOTE — DISCHARGE NOTE ADULT - MEDICATION SUMMARY - MEDICATIONS TO STOP TAKING
I will STOP taking the medications listed below when I get home from the hospital:    MetroGel 0.75% topical gel  -- Apply on skin to affected area once a day on face    carBAMazepine 400 mg oral tablet, extended release  -- 1 tab(s) by mouth once a day (at bedtime)    carBAMazepine 100 mg oral tablet, extended release  -- 5 tab(s) by mouth once a day in the morning    mupirocin 2% topical ointment  -- 1 application on skin 2 times a day to Rt anticubital fossa for 1-2 weeks    triamcinolone 0.1% topical ointment  -- 1 application on skin 2 times a day  to Rt anticubital fossa for 1-2 weeks

## 2018-05-09 NOTE — DISCHARGE NOTE ADULT - CARE PLAN
Principal Discharge DX:	Seizure  Goal:	stable  Assessment and plan of treatment:	You came to the hospital with seizures with a known history of seizures. We did a CT scan of your head which was cleared. We changed your medication from carbamazepine to depakote. Please continue to take 1000 mg of keppra twice a day and 500 mg of depakote twice a day. Please follow up with Dr. Mago jon within 1-2 weeks from discharge. Principal Discharge DX:	Seizure  Goal:	stable  Assessment and plan of treatment:	You came to the hospital with seizures with a known history of seizures. We did a CT scan of your head which was cleared. We changed your medication from carbamazepine to valproic acid. Please continue to take 1000 mg of keppra twice a day and 500 mg of depakote twice a day. Please follow up with Dr. Mago jon within 1-2 weeks from discharge.

## 2018-05-09 NOTE — DISCHARGE NOTE ADULT - PLAN OF CARE
stable You came to the hospital with seizures with a known history of seizures. We did a CT scan of your head which was cleared. We changed your medication from carbamazepine to depakote. Please continue to take 1000 mg of keppra twice a day and 500 mg of depakote twice a day. Please follow up with Dr. Mago jon within 1-2 weeks from discharge. You came to the hospital with seizures with a known history of seizures. We did a CT scan of your head which was cleared. We changed your medication from carbamazepine to valproic acid. Please continue to take 1000 mg of keppra twice a day and 500 mg of depakote twice a day. Please follow up with Dr. Mago jon within 1-2 weeks from discharge.

## 2018-05-09 NOTE — DISCHARGE NOTE ADULT - CARE PROVIDERS DIRECT ADDRESSES
,DirectAddress_Unknown ,DirectAddress_Unknown,[email or web address]..[email or web address]

## 2018-05-09 NOTE — PROGRESS NOTE ADULT - ATTENDING COMMENTS
54 yo female with CP, intractable epilepsy p/w 4 seizures described as eye twitching and full body shaking, each episode lasted <1min. Spoke with outpt provider, and at baseline, pt seizes 1-2 times per month. Home AEDs: LEV 750mg BID, /400 (level 13.2). Pt is borderline hypothermic at 95.1F on admission. Had another witnessed sz in ER. Not alert enough for PO med. No obvious provoking factor. S/p LEV 2gm in ER and LRZ.    Much morning awake and alert this morning. Per care provider, a little more tired compared to baseline.    - discharge pt on LEV 1gm BID and VPA 500mg BID  - sz precaution
Seen and examined with AID in room . She ate fine and is back to her baseline. Possible Dc back tomorrow.

## 2018-05-09 NOTE — DISCHARGE NOTE ADULT - HOSPITAL COURSE
The patient is a 52 yo F PMH of cerebral palsy, intellectual disability, constipation, known seizure disorder p/w seizure. The patient's last known seizure was in March, none since that time. The nursing home reports 4 episodes of witnessed seizures (eyes rolling back with b/l UE and LE shaking) lasting less than 1 minute with fecal incontinenc over 24 hours prior to admission At baseline the patient is non-verbal however is able to groan/yell when in pain. At home patient was taking carbamazepine and keppra Unable to obtain ROS.    In the ED: The patient was hypothermic on admission with BP 92/55, platelet count 115 with carbamazepin level 13.9. She received a CT head which was negative. The patient had another witnessed seizure in the ED and was given a total 2 mg ativan and 2 g of keppra. She was evaluated by the neurology team which recommended loading the patient with depakote and changing carbaamzepine to depakote for prophylaxis. The patient was observed on the medicine floors with no more witnessed seizures. She resumed a regular diet. A CT chest was done which showed mucus plugging but no signs of aspiration pneumonia. After observing the patient for 24 hours with cleared blood cultures she was cleared for discharge home with follow up with Dr. Lalit Dover and Mago Arana The patient is a 54 yo F PMH of cerebral palsy, intellectual disability, constipation, known seizure disorder p/w seizure. The patient's last known seizure was in March, none since that time. The nursing home reports 4 episodes of witnessed seizures (eyes rolling back with b/l UE and LE shaking) lasting less than 1 minute with fecal incontinenc over 24 hours prior to admission At baseline the patient is non-verbal however is able to groan/yell when in pain. At home patient was taking carbamazepine and keppra Unable to obtain ROS.    In the ED: The patient was hypothermic on admission with BP 92/55, platelet count 115 with carbamazepin level 13.9. She received a CT head which was negative. The patient had another witnessed seizure in the ED and was given a total 2 mg ativan and 2 g of keppra. She was evaluated by the neurology team which recommended loading the patient with depakote and changing carbaamzepine to valproic acid for prophylaxis. She was treated with an increased keppra dose and valproic acid dose. The patient was observed on the medicine floors with no more witnessed seizures. She resumed a regular diet. A CT chest was done which showed mucus plugging but no signs of aspiration pneumonia. After observing the patient for 24 hours with cleared blood cultures she was cleared for discharge home with follow up with Dr. Lalit Dover and aMgo Arana.

## 2018-05-09 NOTE — DISCHARGE NOTE ADULT - PROVIDER TOKENS
FREE:[LAST:[jon],FIRST:[surekha],PHONE:[phone]],FAX:[(   )    -],ADDRESS:[[address] [place] [address]]] FREE:[LAST:[jon],FIRST:[surekha],PHONE:[phone]],FAX:[(   )    -],ADDRESS:[[address] [place] [address]]],TOKEN:'[id number]'

## 2018-05-09 NOTE — DISCHARGE NOTE ADULT - CARE PROVIDER_API CALL
surekha jon [address], [place] [address]  Phone: [phone]  Fax: (   )    - surekha jon [address], [place] [address]  Phone: [phone]  Fax: (   )    -    Leatha Dover), Internal Medicine  [address] [place] [id number]  Phone: [phone]  Fax: [phone]

## 2018-05-09 NOTE — DISCHARGE NOTE ADULT - PATIENT PORTAL LINK FT
You can access the [facility] Patient Portal, offered by [facility], by registering with the following website: [email or web address]

## 2018-05-09 NOTE — DISCHARGE NOTE ADULT - MEDICATION SUMMARY - MEDICATIONS TO CHANGE
I will SWITCH the dose or number of times a day I take the medications listed below when I get home from the hospital:    Keppra 750 mg oral tablet  -- 1 tab(s) by mouth 2 times a day

## 2018-05-10 RX ADMIN — ENOXAPARIN SODIUM 40 MILLIGRAM(S): 100 INJECTION SUBCUTANEOUS at 05:00

## 2018-05-10 RX ADMIN — Medication 27.5 MILLIGRAM(S): at 17:35

## 2018-05-10 RX ADMIN — Medication 0.5 MILLIGRAM(S): at 17:35

## 2018-05-10 RX ADMIN — Medication 325 MILLIGRAM(S): at 12:34

## 2018-05-10 RX ADMIN — LEVETIRACETAM 1000 MILLIGRAM(S): 250 TABLET, FILM COATED ORAL at 17:35

## 2018-05-10 RX ADMIN — PANTOPRAZOLE SODIUM 40 MILLIGRAM(S): 20 TABLET, DELAYED RELEASE ORAL at 07:27

## 2018-05-10 RX ADMIN — OLANZAPINE 7.5 MILLIGRAM(S): 15 TABLET, FILM COATED ORAL at 21:22

## 2018-05-10 RX ADMIN — Medication 1 MILLIGRAM(S): at 12:34

## 2018-05-10 RX ADMIN — Medication 1 SPRAY(S): at 05:01

## 2018-05-10 RX ADMIN — Medication 1 SPRAY(S): at 17:35

## 2018-05-10 RX ADMIN — Medication 1 TABLET(S): at 12:34

## 2018-05-10 RX ADMIN — LEVETIRACETAM 1000 MILLIGRAM(S): 250 TABLET, FILM COATED ORAL at 05:00

## 2018-05-10 RX ADMIN — Medication 81 MILLIGRAM(S): at 12:34

## 2018-05-10 RX ADMIN — Medication 0.5 MILLIGRAM(S): at 05:00

## 2018-05-10 RX ADMIN — Medication 27.5 MILLIGRAM(S): at 05:01

## 2018-05-10 RX ADMIN — Medication 100 MILLIGRAM(S): at 12:34

## 2018-05-10 RX ADMIN — NYSTATIN CREAM 1 APPLICATION(S): 100000 CREAM TOPICAL at 05:01

## 2018-05-10 NOTE — PROGRESS NOTE ADULT - PROBLEM SELECTOR PLAN 1
Previous AMS during admission likely in setting of post-ictal state and sedation with ativan. Mental status appears to have improved. She is awake and more responsive.  - Continue keppra 1000 bid and transition to depakote 500 mg bid IV tomorrow if continues to tolerate PO. (Aide was concerned this AM for reduced PO intake).  - Will continue to monitor, still unclear etiology (no trauma, no signs of infection, no electrolyte abnormalities)
- pt had no seizures overnight, now being treated with keppra 1000 bid and depakote 500 mg bid  - will continue to monitor, still unclear etiology(no trauma, no signs of infection, no electrolyte abnormalities)

## 2018-05-10 NOTE — PROGRESS NOTE ADULT - PROBLEM SELECTOR PLAN 2
- c/w ASA, holding lasix now in setting of relative hypotension, unclear if patient has underlying HF, no signs of volume overload on exam, pt has no orthopnea
- c/w ASA, holding lasix now in setting of relative hypotension, unclear if patient has underlying HF, no signs of volume overload on exam, pt has no orthopnea

## 2018-05-10 NOTE — PROGRESS NOTE ADULT - PROBLEM SELECTOR PLAN 5
Diet: Regular  DVT: Lovenox 40 sub Q
Diet: Regular  DVT: Lovenox 40 sub Q  Dispo: Will need to clarify O2 requirements. Likely discharge tomorrow to group home. Will need to touch base with CM/SW in AM

## 2018-05-11 VITALS
TEMPERATURE: 98 F | SYSTOLIC BLOOD PRESSURE: 138 MMHG | OXYGEN SATURATION: 97 % | DIASTOLIC BLOOD PRESSURE: 82 MMHG | HEART RATE: 72 BPM | RESPIRATION RATE: 18 BRPM

## 2018-05-11 RX ORDER — VALPROIC ACID (AS SODIUM SALT) 250 MG/5ML
500 SOLUTION, ORAL ORAL
Refills: 0 | Status: DISCONTINUED | OUTPATIENT
Start: 2018-05-11 | End: 2018-05-12

## 2018-05-11 RX ORDER — LEVETIRACETAM 250 MG/1
1 TABLET, FILM COATED ORAL
Qty: 0 | Refills: 0 | DISCHARGE
Start: 2018-05-11

## 2018-05-11 RX ORDER — LEVETIRACETAM 250 MG/1
1 TABLET, FILM COATED ORAL
Qty: 60 | Refills: 0
Start: 2018-05-11 | End: 2018-06-09

## 2018-05-11 RX ORDER — VALPROIC ACID (AS SODIUM SALT) 250 MG/5ML
2 SOLUTION, ORAL ORAL
Qty: 120 | Refills: 0
Start: 2018-05-11 | End: 2018-06-09

## 2018-05-11 RX ADMIN — NYSTATIN CREAM 1 APPLICATION(S): 100000 CREAM TOPICAL at 05:09

## 2018-05-11 RX ADMIN — Medication 81 MILLIGRAM(S): at 12:10

## 2018-05-11 RX ADMIN — Medication 1 MILLIGRAM(S): at 12:10

## 2018-05-11 RX ADMIN — Medication 1 SPRAY(S): at 05:08

## 2018-05-11 RX ADMIN — PANTOPRAZOLE SODIUM 40 MILLIGRAM(S): 20 TABLET, DELAYED RELEASE ORAL at 07:15

## 2018-05-11 RX ADMIN — ENOXAPARIN SODIUM 40 MILLIGRAM(S): 100 INJECTION SUBCUTANEOUS at 05:09

## 2018-05-11 RX ADMIN — Medication 0.5 MILLIGRAM(S): at 05:15

## 2018-05-11 RX ADMIN — Medication 325 MILLIGRAM(S): at 12:10

## 2018-05-11 RX ADMIN — Medication 100 MILLIGRAM(S): at 12:10

## 2018-05-11 RX ADMIN — LEVETIRACETAM 1000 MILLIGRAM(S): 250 TABLET, FILM COATED ORAL at 05:08

## 2018-05-11 RX ADMIN — Medication 1 TABLET(S): at 12:10

## 2018-05-11 RX ADMIN — Medication 27.5 MILLIGRAM(S): at 05:05

## 2018-05-11 NOTE — PROGRESS NOTE ADULT - ASSESSMENT
54 yo F PMH of cerebral palsy, intellectual disability, constipation, known seizure disorder p/w seizure with last known seizure in 3/18 was on keppra 750 BID, carbamazepine 500am, 400qhs with CT head negative now s/p ativan 1 mg x 2, 1 g keppra x 2, undergoing depakaote load.      Problem/Plan - 1:  ·  Problem: Seizure.  Plan: - No More Seizures , now being treated with keppra 1000 bid and depakote 500 mg bid  - Neurology help appreciated.      Problem/Plan - 2:  ·  Problem: CAD (coronary artery disease).  Plan: - c/w home meds.      Problem/Plan - 3:  ·  Problem: Cerebral palsy.  Plan: - pt non verbal at baseline, reportedly able to grunt when she is in pain.  - will continue to monitor.      Problem/Plan - 4:  ·  Problem: Constipation.  Plan: - will c/w outpatient regiment of colace, senna, Miralax.      Problem/Plan - 5:  ·  Problem: Hypothermia .  Plan: Infection ruled out. Cultures negative so far.      Problem/Plan - 6:  ·  Problem: Prophylactic measure.  Plan: Diet: Regular  DVT: Lovenox 40 sub Q.     Disposition : DC planning.
52 yo F PMH of cerebral palsy, intellectual disability, constipation, known seizure disorder p/w seizure with last known seizure in 3/18 was on keppra 750 BID, carbamazepine 500am, 400qhs with CT head negative now s/p ativan 1 mg x 2, 1 g keppra x 2, undergoing depakaote load.      Problem/Plan - 1:  ·  Problem: Seizure.  Plan: - No More Seizures , now being treated with keppra 1000 bid and depakote 500 mg bid  - Neurology help appreciated.      Problem/Plan - 2:  ·  Problem: CAD (coronary artery disease).  Plan: - c/w ASA, holding lasix now in setting of relative hypotension, unclear if patient has underlying HF, no signs of volume overload on exam, pt has no orthopnea.      Problem/Plan - 3:  ·  Problem: Cerebral palsy.  Plan: - pt non verbal at baseline, reportedly able to grunt when she is in pain.  - will continue to monitor.      Problem/Plan - 4:  ·  Problem: Constipation.  Plan: - will c/w outpatient regiment of colace, senna, Miralax.      Problem/Plan - 5:  ·  Problem: Hypothermia .  Plan: Infection ruled out. Cultures negative so far.      Problem/Plan - 6:  ·  Problem: Prophylactic measure.  Plan: Diet: Regular  DVT: Lovenox 40 sub Q.     Disposition : DC planning.
54 y/o F w/ CP and severe MR, intractable seizures (frequency 1-2x/mo as per outpatient Neurologist Dr Arana) presenting w/ seizure cluster in setting of hypothermia of unknown etiology. Now back to baseline.     Plan:   - continue with Depakote 500 mg BID and Keppra 1 g BID, can be changed to PO with 1:1 conversion rate once tolerating feeds   - cleared for discharge from Neuro standpoint, please recall with any questions   - outpatient Neurologist Dr Mago Arana requesting dc paperwork to be faxed please at [phone]
52 yo F PMH of cerebral palsy, intellectual disability, constipation, known seizure disorder p/w seizure with last known seizure in 3/18 was on keppra 750 BID, carbamazepine 500am, 400qhs with CT head negative now s/p ativan 1 mg x 2, 1 g keppra x 2, undergoing depakaote load.
The patient is 52 yo F PMH of cerebral palsy, intellectual disability, constipation, known seizure disorder p/w seizure with last known seizure in 3/18 was on keppra 750 BID, carbamazepine 500am, 400qhs with CT head negative now s/p ativan 1 mg x 2, 1 g keppra x 2, undergoing depakaote load.

## 2018-05-11 NOTE — PROGRESS NOTE ADULT - SUBJECTIVE AND OBJECTIVE BOX
INTERVAL HPI/OVERNIGHT EVENTS: Seen and examined with HHA in room . Eating well and back to her baseline.   Vital Signs Last 24 Hrs  T(C): 36.7 (11 May 2018 12:54), Max: 36.7 (10 May 2018 19:00)  T(F): 98 (11 May 2018 12:54), Max: 98.1 (11 May 2018 05:29)  HR: 72 (11 May 2018 12:54) (72 - 79)  BP: 138/82 (11 May 2018 12:54) (120/76 - 138/82)  BP(mean): --  RR: 18 (11 May 2018 12:54) (18 - 18)  SpO2: 97% (11 May 2018 12:54) (96% - 100%)  I&O's Summary    MEDICATIONS  (STANDING):  aspirin  chewable 81 milliGRAM(s) Oral daily  benztropine 0.5 milliGRAM(s) Oral two times a day  calcium carbonate 1250 mG + Vitamin D (OsCal 500 + D) 1 Tablet(s) Oral daily  enoxaparin Injectable 40 milliGRAM(s) SubCutaneous every 24 hours  ferrous    sulfate 325 milliGRAM(s) Oral daily  fluticasone propionate 50 MICROgram(s)/spray Nasal Spray 1 Spray(s) Both Nostrils two times a day  folic acid 1 milliGRAM(s) Oral daily  levETIRAcetam 1000 milliGRAM(s) Oral two times a day  nystatin Cream 1 Application(s) Topical two times a day  OLANZapine 7.5 milliGRAM(s) Oral at bedtime  pantoprazole    Tablet 40 milliGRAM(s) Oral before breakfast  thiamine 100 milliGRAM(s) Oral daily  valproate sodium IVPB 500 milliGRAM(s) IV Intermittent two times a day    MEDICATIONS  (PRN):  ALBUTerol    90 MICROgram(s) HFA Inhaler 2 Puff(s) Inhalation every 6 hours PRN Shortness of Breath    LABS:          Consultant(s) Notes Reviewed:  [x ] YES  [ ] NO    PHYSICAL EXAM:  GENERAL: NAD,not in any distress ,  HEAD:  Atraumatic, Normocephalic  EYES: EOMI, PERRLA, conjunctiva and sclera clear  ENMT: No tonsillar erythema, exudates, or enlargement; Moist mucous membranes, Good dentition, No lesions  NECK: Supple, No JVD, Normal thyroid  NERVOUS SYSTEM:  Alert & noncommunicative   CHEST/LUNG: Good air entry bilateral with no  rales, rhonchi, wheezing, or rubs  HEART: Regular rate and rhythm; No murmurs, rubs, or gallops  ABDOMEN: Soft, Nontender, Nondistended; Bowel sounds present  EXTREMITIES:  2+ Peripheral Pulses, No clubbing, cyanosis, or edema    Care Discussed with Consultants/Other Providers [ x] YES  [ ] NO
NEUROLOGY FOLLOW UP NOTE     Patient is a 53y old  Female who presents with a chief complaint of seizures (08 May 2018 15:42)      SUBJECTIVE, INTERVAL HISTORY: No events overnight, more awake today. As per aide, at baseline. f    OBJECTIVE:   Vital Signs Last 24 Hrs  T(C): 36.6 (09 May 2018 08:35), Max: 36.8 (08 May 2018 16:07)  T(F): 97.9 (09 May 2018 08:35), Max: 98.2 (08 May 2018 16:07)  HR: 70 (09 May 2018 08:35) (65 - 95)  BP: 100/58 (09 May 2018 08:35) (96/62 - 106/53)  BP(mean): --  RR: 20 (09 May 2018 08:35) (18 - 22)  SpO2: 95% (09 May 2018 08:35) (95% - 100%)    05-09    145  |  102  |  21  ----------------------------<  54<L>  4.6   |  26  |  0.74    Ca    9.1      09 May 2018 07:01  TPro  7.9  /  Alb  3.8  /  TBili  < 0.2<L>  /  DBili  x   /  AST  25  /  ALT  28  /  AlkPhos  109  05-08                          11.7   4.30  )-----------( 104      ( 09 May 2018 07:01 )             37.5       MEDICATIONS  (STANDING):  aspirin enteric coated 81 milliGRAM(s) Oral daily  benztropine 0.5 milliGRAM(s) Oral two times a day  calcium carbonate 1250 mG + Vitamin D (OsCal 500 + D) 1 Tablet(s) Oral daily  dextrose 5% + sodium chloride 0.9%. 1000 milliLiter(s) (100 mL/Hr) IV Continuous <Continuous>  enoxaparin Injectable 40 milliGRAM(s) SubCutaneous every 24 hours  ferrous    sulfate 325 milliGRAM(s) Oral daily  fluticasone propionate 50 MICROgram(s)/spray Nasal Spray 1 Spray(s) Both Nostrils two times a day  folic acid 1 milliGRAM(s) Oral daily  levETIRAcetam  IVPB 1000 milliGRAM(s) IV Intermittent every 12 hours  nystatin Cream 1 Application(s) Topical two times a day  OLANZapine 7.5 milliGRAM(s) Oral at bedtime  pantoprazole    Tablet 40 milliGRAM(s) Oral before breakfast  thiamine 100 milliGRAM(s) Oral daily  valproate sodium IVPB 500 milliGRAM(s) IV Intermittent two times a day      GENERAL EXAM: No acute distress                   NEUROLOGICAL EXAM: limited due to cognitive impairment   Mental Status: alert, eyes open, tracks movement, non verbal at baseline   Cranial Nerves: EOMI, face symmetric, resisting eye examination  Motor: contracted b/l UE/LEs but able to move b/l UEs w/ clenched fists       RADIOLOGY:
INTERVAL HPI/OVERNIGHT EVENTS: Seen and examined with AID in room.   Vital Signs Last 24 Hrs  T(C): 36.6 (10 May 2018 14:30), Max: 36.8 (10 May 2018 04:56)  T(F): 97.9 (10 May 2018 14:30), Max: 98.2 (10 May 2018 04:56)  HR: 79 (10 May 2018 14:30) (66 - 84)  BP: 123/77 (10 May 2018 14:30) (110/53 - 128/72)  BP(mean): --  RR: 18 (10 May 2018 14:30) (18 - 19)  SpO2: 100% (10 May 2018 14:30) (94% - 100%)  I&O's Summary    MEDICATIONS  (STANDING):  aspirin  chewable 81 milliGRAM(s) Oral daily  benztropine 0.5 milliGRAM(s) Oral two times a day  calcium carbonate 1250 mG + Vitamin D (OsCal 500 + D) 1 Tablet(s) Oral daily  enoxaparin Injectable 40 milliGRAM(s) SubCutaneous every 24 hours  ferrous    sulfate 325 milliGRAM(s) Oral daily  fluticasone propionate 50 MICROgram(s)/spray Nasal Spray 1 Spray(s) Both Nostrils two times a day  folic acid 1 milliGRAM(s) Oral daily  levETIRAcetam 1000 milliGRAM(s) Oral two times a day  nystatin Cream 1 Application(s) Topical two times a day  OLANZapine 7.5 milliGRAM(s) Oral at bedtime  pantoprazole    Tablet 40 milliGRAM(s) Oral before breakfast  thiamine 100 milliGRAM(s) Oral daily  valproate sodium IVPB 500 milliGRAM(s) IV Intermittent two times a day    MEDICATIONS  (PRN):  ALBUTerol    90 MICROgram(s) HFA Inhaler 2 Puff(s) Inhalation every 6 hours PRN Shortness of Breath    LABS:                        11.7   4.30  )-----------( 104      ( 09 May 2018 07:01 )             37.5     05-09    145  |  102  |  21  ----------------------------<  54<L>  4.6   |  26  |  0.74    Ca    9.1      09 May 2018 07:01                      PHYSICAL EXAM:  GENERAL: NAD, well-groomed, well-developed, not in any distress ,  HEAD:  Atraumatic, Normocephalic  EYES: EOMI, PERRLA, conjunctiva and sclera clear  ENMT: No tonsillar erythema, exudates, or enlargement; Moist mucous membranes, Good dentition, No lesions  NECK: Supple, No JVD, Normal thyroid  NERVOUS SYSTEM:  Alert & noncommunicative   CHEST/LUNG: Good air entry bilateral with no  rales, rhonchi, wheezing, or rubs  HEART: Regular rate and rhythm; No murmurs, rubs, or gallops  ABDOMEN: Soft, Nontender, Nondistended; Bowel sounds present  EXTREMITIES:  2+ Peripheral Pulses, No clubbing, cyanosis, or edema  SKIN: No rashes or lesions    Care Discussed with Consultants/Other Providers [ x] YES  [ ] NO
Patient is a 53y old  Female who presents with a chief complaint of seizures (08 May 2018 15:42)      SUBJECTIVE / OVERNIGHT EVENTS:    Overnight no seizure like activity, pt far more awake this AM.     MEDICATIONS  (STANDING):  aspirin  chewable 81 milliGRAM(s) Oral daily  benztropine 0.5 milliGRAM(s) Oral two times a day  calcium carbonate 1250 mG + Vitamin D (OsCal 500 + D) 1 Tablet(s) Oral daily  enoxaparin Injectable 40 milliGRAM(s) SubCutaneous every 24 hours  ferrous    sulfate 325 milliGRAM(s) Oral daily  fluticasone propionate 50 MICROgram(s)/spray Nasal Spray 1 Spray(s) Both Nostrils two times a day  folic acid 1 milliGRAM(s) Oral daily  levETIRAcetam 1000 milliGRAM(s) Oral two times a day  nystatin Cream 1 Application(s) Topical two times a day  OLANZapine 7.5 milliGRAM(s) Oral at bedtime  pantoprazole    Tablet 40 milliGRAM(s) Oral before breakfast  thiamine 100 milliGRAM(s) Oral daily  valproate sodium IVPB 500 milliGRAM(s) IV Intermittent two times a day    MEDICATIONS  (PRN):  ALBUTerol    90 MICROgram(s) HFA Inhaler 2 Puff(s) Inhalation every 6 hours PRN Shortness of Breath      Vital Signs Last 24 Hrs  T(C): 36.7 (09 May 2018 15:22), Max: 36.7 (09 May 2018 04:29)  T(F): 98 (09 May 2018 15:22), Max: 98 (09 May 2018 04:29)  HR: 75 (09 May 2018 15:22) (70 - 93)  BP: 138/68 (09 May 2018 15:22) (96/62 - 138/68)  BP(mean): --  RR: 19 (09 May 2018 15:22) (18 - 20)  SpO2: 95% (09 May 2018 15:22) (95% - 100%)  CAPILLARY BLOOD GLUCOSE      POCT Blood Glucose.: 70 mg/dL (09 May 2018 12:32)  POCT Blood Glucose.: 92 mg/dL (09 May 2018 09:22)    I&O's Summary        PHYSICAL EXAM:  GENERAL: NAD, well-groomed, well-developed ,not in any distress ,  HEAD:  Atraumatic, Normocephalic  EYES: EOMI, PERRLA, conjunctiva and sclera clear  ENMT: No tonsillar erythema, exudates, or enlargement; Moist mucous membranes, Good dentition, No lesions  NECK: Supple, No JVD, Normal thyroid  NERVOUS SYSTEM:  Alert &non communicative   CHEST/LUNG: Good air entry bilateral with no  rales, rhonchi, wheezing, or rubs  HEART: Regular rate and rhythm; No murmurs, rubs, or gallops  ABDOMEN: Soft, Nontender, Nondistended; Bowel sounds present  EXTREMITIES:  2+ Peripheral Pulses, No clubbing, cyanosis, or edema  SKIN: rash rt antecubital area  LABS:                        11.7   4.30  )-----------( 104      ( 09 May 2018 07:01 )             37.5     05-    145  |  102  |  21  ----------------------------<  54<L>  4.6   |  26  |  0.74    Ca    9.1      09 May 2018 07:01    TPro  7.9  /  Alb  3.8  /  TBili  < 0.2<L>  /  DBili  x   /  AST  25  /  ALT  28  /  AlkPhos  109  05-08          Urinalysis Basic - ( 08 May 2018 13:08 )    Color: YELLOW / Appearance: CLEAR / S.028 / pH: 6.0  Gluc: NEGATIVE / Ketone: TRACE  / Bili: NEGATIVE / Urobili: NORMAL mg/dL   Blood: NEGATIVE / Protein: 30 mg/dL / Nitrite: NEGATIVE   Leuk Esterase: NEGATIVE / RBC: 2-5 / WBC 2-5   Sq Epi: OCC / Non Sq Epi: x / Bacteria: FEW        RADIOLOGY & ADDITIONAL TESTS:    Imaging Personally Reviewed:    Consultant(s) Notes Reviewed:      Care Discussed with Consultants/Other Providers:
Patient is a 53y old  Female who presents with a chief complaint of seizures (09 May 2018 18:21)      SUBJECTIVE / OVERNIGHT EVENTS:  Comfortable appearing this AM off O2. Nonverbal, however mental status improved. Appears more alert and responsive.     MEDICATIONS  (STANDING):  aspirin  chewable 81 milliGRAM(s) Oral daily  benztropine 0.5 milliGRAM(s) Oral two times a day  calcium carbonate 1250 mG + Vitamin D (OsCal 500 + D) 1 Tablet(s) Oral daily  enoxaparin Injectable 40 milliGRAM(s) SubCutaneous every 24 hours  ferrous    sulfate 325 milliGRAM(s) Oral daily  fluticasone propionate 50 MICROgram(s)/spray Nasal Spray 1 Spray(s) Both Nostrils two times a day  folic acid 1 milliGRAM(s) Oral daily  levETIRAcetam 1000 milliGRAM(s) Oral two times a day  nystatin Cream 1 Application(s) Topical two times a day  OLANZapine 7.5 milliGRAM(s) Oral at bedtime  pantoprazole    Tablet 40 milliGRAM(s) Oral before breakfast  thiamine 100 milliGRAM(s) Oral daily  valproate sodium IVPB 500 milliGRAM(s) IV Intermittent two times a day    MEDICATIONS  (PRN):  ALBUTerol    90 MICROgram(s) HFA Inhaler 2 Puff(s) Inhalation every 6 hours PRN Shortness of Breath      CAPILLARY BLOOD GLUCOSE        I&O's Summary      PHYSICAL EXAM:  GENERAL: NAD, obese  HEAD:  Atraumatic, Normocephalic  EYES: Disconjugate gaze  CHEST/LUNG: Decreased breath sounds  HEART: Distant heart sounds but regular  ABDOMEN: Soft, Nontender, Nondistended; obese  PSYCH: Nonverbal  NEUROLOGY: Nonverbal    LABS:                        11.7   4.30  )-----------( 104      ( 09 May 2018 07:01 )             37.5     05-09    145  |  102  |  21  ----------------------------<  54<L>  4.6   |  26  |  0.74    Ca    9.1      09 May 2018 07:01                RADIOLOGY & ADDITIONAL TESTS:    Imaging Personally Reviewed:    Consultant(s) Notes Reviewed:      Care Discussed with Consultants/Other Providers:

## 2018-05-13 LAB
BACTERIA BLD CULT: SIGNIFICANT CHANGE UP
BACTERIA BLD CULT: SIGNIFICANT CHANGE UP

## 2018-05-30 LAB — LEVETIRACETAM SERPL-MCNC: 26.7 — SIGNIFICANT CHANGE UP

## 2018-06-06 ENCOUNTER — INPATIENT (INPATIENT)
Facility: HOSPITAL | Age: 53
LOS: 16 days | Discharge: NURSING, REHAB OR LONG-TERM CARE | End: 2018-06-23
Attending: INTERNAL MEDICINE | Admitting: INTERNAL MEDICINE
Payer: MEDICAID

## 2018-06-06 VITALS
HEART RATE: 76 BPM | SYSTOLIC BLOOD PRESSURE: 115 MMHG | RESPIRATION RATE: 17 BRPM | DIASTOLIC BLOOD PRESSURE: 68 MMHG | TEMPERATURE: 98 F

## 2018-06-06 LAB
ALBUMIN SERPL ELPH-MCNC: 3.5 G/DL — SIGNIFICANT CHANGE UP (ref 3.3–5)
ALP SERPL-CCNC: 79 U/L — SIGNIFICANT CHANGE UP (ref 40–120)
ALT FLD-CCNC: 18 U/L — SIGNIFICANT CHANGE UP (ref 4–33)
APPEARANCE UR: CLEAR — SIGNIFICANT CHANGE UP
AST SERPL-CCNC: 21 U/L — SIGNIFICANT CHANGE UP (ref 4–32)
BASE EXCESS BLDV CALC-SCNC: 14.3 MMOL/L — SIGNIFICANT CHANGE UP
BASOPHILS # BLD AUTO: 0.03 K/UL — SIGNIFICANT CHANGE UP (ref 0–0.2)
BASOPHILS NFR BLD AUTO: 0.4 % — SIGNIFICANT CHANGE UP (ref 0–2)
BILIRUB SERPL-MCNC: < 0.2 MG/DL — LOW (ref 0.2–1.2)
BILIRUB UR-MCNC: NEGATIVE — SIGNIFICANT CHANGE UP
BLOOD GAS VENOUS - CREATININE: 0.68 MG/DL — SIGNIFICANT CHANGE UP (ref 0.5–1.3)
BLOOD UR QL VISUAL: NEGATIVE — SIGNIFICANT CHANGE UP
BUN SERPL-MCNC: 17 MG/DL — SIGNIFICANT CHANGE UP (ref 7–23)
CALCIUM SERPL-MCNC: 9.6 MG/DL — SIGNIFICANT CHANGE UP (ref 8.4–10.5)
CHLORIDE BLDV-SCNC: 101 MMOL/L — SIGNIFICANT CHANGE UP (ref 96–108)
CHLORIDE SERPL-SCNC: 99 MMOL/L — SIGNIFICANT CHANGE UP (ref 98–107)
CO2 SERPL-SCNC: 34 MMOL/L — HIGH (ref 22–31)
COD CRY URNS QL: SIGNIFICANT CHANGE UP (ref 0–0)
COLOR SPEC: YELLOW — SIGNIFICANT CHANGE UP
CREAT SERPL-MCNC: 0.79 MG/DL — SIGNIFICANT CHANGE UP (ref 0.5–1.3)
EOSINOPHIL # BLD AUTO: 0.12 K/UL — SIGNIFICANT CHANGE UP (ref 0–0.5)
EOSINOPHIL NFR BLD AUTO: 1.8 % — SIGNIFICANT CHANGE UP (ref 0–6)
GAS PNL BLDV: 143 MMOL/L — SIGNIFICANT CHANGE UP (ref 136–146)
GLUCOSE BLDV-MCNC: 101 — HIGH (ref 70–99)
GLUCOSE SERPL-MCNC: 95 MG/DL — SIGNIFICANT CHANGE UP (ref 70–99)
GLUCOSE UR-MCNC: NEGATIVE — SIGNIFICANT CHANGE UP
HCO3 BLDV-SCNC: 34 MMOL/L — HIGH (ref 20–27)
HCT VFR BLD CALC: 40.8 % — SIGNIFICANT CHANGE UP (ref 34.5–45)
HCT VFR BLDV CALC: 41.6 % — SIGNIFICANT CHANGE UP (ref 34.5–45)
HGB BLD-MCNC: 13.1 G/DL — SIGNIFICANT CHANGE UP (ref 11.5–15.5)
HGB BLDV-MCNC: 13.5 G/DL — SIGNIFICANT CHANGE UP (ref 11.5–15.5)
HYALINE CASTS # UR AUTO: SIGNIFICANT CHANGE UP (ref 0–?)
IMM GRANULOCYTES # BLD AUTO: 0.06 # — SIGNIFICANT CHANGE UP
IMM GRANULOCYTES NFR BLD AUTO: 0.9 % — SIGNIFICANT CHANGE UP (ref 0–1.5)
KETONES UR-MCNC: SIGNIFICANT CHANGE UP
LACTATE BLDV-MCNC: 1.1 MMOL/L — SIGNIFICANT CHANGE UP (ref 0.5–2)
LEUKOCYTE ESTERASE UR-ACNC: SIGNIFICANT CHANGE UP
LYMPHOCYTES # BLD AUTO: 2.05 K/UL — SIGNIFICANT CHANGE UP (ref 1–3.3)
LYMPHOCYTES # BLD AUTO: 30.2 % — SIGNIFICANT CHANGE UP (ref 13–44)
MCHC RBC-ENTMCNC: 32.1 % — SIGNIFICANT CHANGE UP (ref 32–36)
MCHC RBC-ENTMCNC: 32.1 PG — SIGNIFICANT CHANGE UP (ref 27–34)
MCV RBC AUTO: 100 FL — SIGNIFICANT CHANGE UP (ref 80–100)
MONOCYTES # BLD AUTO: 0.71 K/UL — SIGNIFICANT CHANGE UP (ref 0–0.9)
MONOCYTES NFR BLD AUTO: 10.5 % — SIGNIFICANT CHANGE UP (ref 2–14)
MUCOUS THREADS # UR AUTO: SIGNIFICANT CHANGE UP
NEUTROPHILS # BLD AUTO: 3.81 K/UL — SIGNIFICANT CHANGE UP (ref 1.8–7.4)
NEUTROPHILS NFR BLD AUTO: 56.2 % — SIGNIFICANT CHANGE UP (ref 43–77)
NITRITE UR-MCNC: NEGATIVE — SIGNIFICANT CHANGE UP
NON-SQ EPI CELLS # UR AUTO: <1 — SIGNIFICANT CHANGE UP
NRBC # FLD: 0 — SIGNIFICANT CHANGE UP
PCO2 BLDV: 69 MMHG — HIGH (ref 41–51)
PH BLDV: 7.38 PH — SIGNIFICANT CHANGE UP (ref 7.32–7.43)
PH UR: 6 — SIGNIFICANT CHANGE UP (ref 4.6–8)
PLATELET # BLD AUTO: 181 K/UL — SIGNIFICANT CHANGE UP (ref 150–400)
PMV BLD: 10.4 FL — SIGNIFICANT CHANGE UP (ref 7–13)
PO2 BLDV: < 24 MMHG — LOW (ref 35–40)
POTASSIUM BLDV-SCNC: 4.2 MMOL/L — SIGNIFICANT CHANGE UP (ref 3.4–4.5)
POTASSIUM SERPL-MCNC: 4.2 MMOL/L — SIGNIFICANT CHANGE UP (ref 3.5–5.3)
POTASSIUM SERPL-SCNC: 4.2 MMOL/L — SIGNIFICANT CHANGE UP (ref 3.5–5.3)
PROT SERPL-MCNC: 7.6 G/DL — SIGNIFICANT CHANGE UP (ref 6–8.3)
PROT UR-MCNC: 30 MG/DL — HIGH
RBC # BLD: 4.08 M/UL — SIGNIFICANT CHANGE UP (ref 3.8–5.2)
RBC # FLD: 13.5 % — SIGNIFICANT CHANGE UP (ref 10.3–14.5)
RBC CASTS # UR COMP ASSIST: SIGNIFICANT CHANGE UP (ref 0–?)
SAO2 % BLDV: 24.8 % — LOW (ref 60–85)
SODIUM SERPL-SCNC: 145 MMOL/L — SIGNIFICANT CHANGE UP (ref 135–145)
SP GR SPEC: 1.03 — SIGNIFICANT CHANGE UP (ref 1–1.04)
UROBILINOGEN FLD QL: NORMAL MG/DL — SIGNIFICANT CHANGE UP
VALPROATE SERPL-MCNC: 60.8 UG/ML — SIGNIFICANT CHANGE UP (ref 50–100)
WBC # BLD: 6.78 K/UL — SIGNIFICANT CHANGE UP (ref 3.8–10.5)
WBC # FLD AUTO: 6.78 K/UL — SIGNIFICANT CHANGE UP (ref 3.8–10.5)
WBC UR QL: SIGNIFICANT CHANGE UP (ref 0–?)

## 2018-06-06 PROCEDURE — 71045 X-RAY EXAM CHEST 1 VIEW: CPT | Mod: 26

## 2018-06-06 PROCEDURE — 70450 CT HEAD/BRAIN W/O DYE: CPT | Mod: 26

## 2018-06-06 RX ORDER — LEVETIRACETAM 250 MG/1
1500 TABLET, FILM COATED ORAL ONCE
Refills: 0 | Status: COMPLETED | OUTPATIENT
Start: 2018-06-06 | End: 2018-06-06

## 2018-06-06 RX ORDER — DIVALPROEX SODIUM 500 MG/1
1 TABLET, DELAYED RELEASE ORAL
Qty: 60 | Refills: 0
Start: 2018-06-06 | End: 2018-07-05

## 2018-06-06 RX ORDER — LEVETIRACETAM 250 MG/1
1 TABLET, FILM COATED ORAL
Qty: 60 | Refills: 0
Start: 2018-06-06 | End: 2018-07-05

## 2018-06-06 RX ADMIN — LEVETIRACETAM 1500 MILLIGRAM(S): 250 TABLET, FILM COATED ORAL at 20:13

## 2018-06-06 NOTE — CONSULT NOTE ADULT - SUBJECTIVE AND OBJECTIVE BOX
Neurology Consult    Patient is a 53y old  Female who presents with a chief complaint of     HPI:  54 y/o F w/ CP and severe MR, intractable seizures (frequency 1-2x/mo as per outpatient Neurologist Dr Arana) presenting w/ seizure cluster in setting of hypothermia of unknown etiology. At home patient Home AEDs: LEV 750mg BID, /400. Patient recently admitted for cluster of seizure, and discharged on LEV 1gm BID and VPA 500mg BID. During last admission patient given load of Keppra X 2 and Depakote, upon d/c was neurologically stable. She returns today as aid describes the seizure as twitching of the eye, and full body jerking sensations.  Denies any LOC, but later each seizure it took longer for patient to return to her baseline, twenty minutes.     PAST MEDICAL & SURGICAL HISTORY:  Constipation  Seizure disorder  Cerebral palsy  No significant past surgical history      Allergies    Topamax (Unknown)    Intolerances        MEDICATIONS  (STANDING):    MEDICATIONS  (PRN):      Social History: Denies tob, EtOH use     FAMILY HISTORY:  No pertinent family history in first degree relatives      Review of Systems:  CONSTITUTIONAL:  No weight loss, fever, chills, weakness or fatigue.  HEENT:  Eyes:  No visual loss, blurred vision, double vision or yellow sclerae. Ears, Nose, Throat:  No hearing loss, sneezing, congestion, runny nose or sore throat.  SKIN:  No rash or itching.  CARDIOVASCULAR:  No chest pain, chest pressure or chest discomfort. No palpitations or edema.  RESPIRATORY:  No shortness of breath, cough or sputum.  GASTROINTESTINAL:  No anorexia, nausea, vomiting or diarrhea. No abdominal pain or blood.  GENITOURINARY:  Burning on urination. Pregnancy. Last menstrual period, MM/DD/YYYY.  NEUROLOGICAL:  No headache, dizziness, syncope, paralysis, ataxia, numbness or tingling in the extremities. No change in bowel or bladder control.  MUSCULOSKELETAL:  No muscle, back pain, joint pain or stiffness.  HEMATOLOGIC:  No anemia, bleeding or bruising.  LYMPHATICS:  No enlarged nodes. No history of splenectomy.  PSYCHIATRIC:  No history of depression or anxiety.  ENDOCRINOLOGIC:  No reports of sweating, cold or heat intolerance. No polyuria or polydipsia.      Physical Exam:   Vital Signs Last 24 Hrs  T(C): 36.4 (06 Jun 2018 13:34), Max: 36.4 (06 Jun 2018 13:34)  T(F): 97.6 (06 Jun 2018 13:34), Max: 97.6 (06 Jun 2018 13:34)  HR: 76 (06 Jun 2018 13:34) (76 - 76)  BP: 115/68 (06 Jun 2018 13:34) (115/68 - 115/68)  BP(mean): --  RR: 17 (06 Jun 2018 13:34) (17 - 17)  SpO2: --    General Exam:       Labs:     CBC Full  -  ( 06 Jun 2018 16:59 )  WBC Count : 6.78 K/uL  Hemoglobin : 13.1 g/dL  Hematocrit : 40.8 %  Platelet Count - Automated : 181 K/uL  Mean Cell Volume : 100.0 fL  Mean Cell Hemoglobin : 32.1 pg  Mean Cell Hemoglobin Concentration : 32.1 %  Auto Neutrophil # : 3.81 K/uL  Auto Lymphocyte # : 2.05 K/uL  Auto Monocyte # : 0.71 K/uL  Auto Eosinophil # : 0.12 K/uL  Auto Basophil # : 0.03 K/uL  Auto Neutrophil % : 56.2 %  Auto Lymphocyte % : 30.2 %  Auto Monocyte % : 10.5 %  Auto Eosinophil % : 1.8 %  Auto Basophil % : 0.4 % Neurology Consult    Patient is a 53y old  Female who presents with a chief complaint of     HPI:  54 y/o F w/ CP and severe MR, intractable seizures (frequency 1-2x/mo as per outpatient Neurologist Dr Arana) presenting w/ seizure cluster today. . Patient recently admitted for cluster of seizure, and discharged on LEV 1gm BID and VPA 500mg BID. As per nurse recently saw her PCP who reduced the keppra dosage from 1000 mg BID back to 750mg BID, after that patient had 1 grand mal seizure last week with fecal incontinence, and a cluster today of clonic episodes lasting a few minutes with confusion. Aid denies any recent infections, travel, or changes in other medications.     PAST MEDICAL & SURGICAL HISTORY:  Constipation  Seizure disorder  Cerebral palsy  No significant past surgical history      Allergies    Topamax (Unknown)    Intolerances        MEDICATIONS  (STANDING):    MEDICATIONS  (PRN):      Social History: Denies tob, EtOH use     FAMILY HISTORY:  No pertinent family history in first degree relatives      Review of Systems:  CONSTITUTIONAL:  No weight loss, fever, chills, weakness or fatigue.  HEENT:  Eyes:  No visual loss, blurred vision, double vision or yellow sclerae. Ears, Nose, Throat:  No hearing loss, sneezing, congestion, runny nose or sore throat.  SKIN:  No rash or itching.  CARDIOVASCULAR:  No chest pain, chest pressure or chest discomfort. No palpitations or edema.  RESPIRATORY:  No shortness of breath, cough or sputum.  GASTROINTESTINAL:  No anorexia, nausea, vomiting or diarrhea. No abdominal pain or blood.  GENITOURINARY:  Burning on urination. Pregnancy. Last menstrual period, MM/DD/YYYY.  NEUROLOGICAL:  No headache, dizziness, syncope, paralysis, ataxia, numbness or tingling in the extremities. No change in bowel or bladder control.  MUSCULOSKELETAL:  No muscle, back pain, joint pain or stiffness.  HEMATOLOGIC:  No anemia, bleeding or bruising.  LYMPHATICS:  No enlarged nodes. No history of splenectomy.  PSYCHIATRIC:  No history of depression or anxiety.  ENDOCRINOLOGIC:  No reports of sweating, cold or heat intolerance. No polyuria or polydipsia.      Physical Exam:   Vital Signs Last 24 Hrs  T(C): 36.4 (06 Jun 2018 13:34), Max: 36.4 (06 Jun 2018 13:34)  T(F): 97.6 (06 Jun 2018 13:34), Max: 97.6 (06 Jun 2018 13:34)  HR: 76 (06 Jun 2018 13:34) (76 - 76)  BP: 115/68 (06 Jun 2018 13:34) (115/68 - 115/68)  BP(mean): --  RR: 17 (06 Jun 2018 13:34) (17 - 17)  SpO2: --    General Exam:       Labs:     CBC Full  -  ( 06 Jun 2018 16:59 )  WBC Count : 6.78 K/uL  Hemoglobin : 13.1 g/dL  Hematocrit : 40.8 %  Platelet Count - Automated : 181 K/uL  Mean Cell Volume : 100.0 fL  Mean Cell Hemoglobin : 32.1 pg  Mean Cell Hemoglobin Concentration : 32.1 %  Auto Neutrophil # : 3.81 K/uL  Auto Lymphocyte # : 2.05 K/uL  Auto Monocyte # : 0.71 K/uL  Auto Eosinophil # : 0.12 K/uL  Auto Basophil # : 0.03 K/uL  Auto Neutrophil % : 56.2 %  Auto Lymphocyte % : 30.2 %  Auto Monocyte % : 10.5 %  Auto Eosinophil % : 1.8 %  Auto Basophil % : 0.4 % Neurology Consult    Patient is a 53y old  Female who presents with a chief complaint of     HPI:  54 y/o F w/ CP and severe MR, intractable seizures (frequency 1-2x/mo as per outpatient Neurologist Dr Arana) presenting w/ seizure cluster today. . Patient recently admitted for cluster of seizure, and discharged on LEV 1gm BID and VPA 500mg BID. As per nurse recently saw her PCP who reduced the keppra dosage from 1000 mg BID back to 750mg BID, after that patient had 1 grand mal seizure last week with fecal incontinence, and a cluster today of clonic episodes lasting a few minutes with confusion. Aid denies any recent infections, travel, or changes in other medications.     PAST MEDICAL & SURGICAL HISTORY:  Constipation  Seizure disorder  Cerebral palsy  No significant past surgical history      Allergies    Topamax (Unknown)    Intolerances        MEDICATIONS  (STANDING):    MEDICATIONS  (PRN):      Social History: Denies tob, EtOH use     FAMILY HISTORY:  No pertinent family history in first degree relatives      Review of Systems:  as above    Physical Exam:   Vital Signs Last 24 Hrs  T(C): 36.4 (06 Jun 2018 13:34), Max: 36.4 (06 Jun 2018 13:34)  T(F): 97.6 (06 Jun 2018 13:34), Max: 97.6 (06 Jun 2018 13:34)  HR: 76 (06 Jun 2018 13:34) (76 - 76)  BP: 115/68 (06 Jun 2018 13:34) (115/68 - 115/68)  BP(mean): --  RR: 17 (06 Jun 2018 13:34) (17 - 17)  SpO2: --    General Exam:   Patient alert follows 1-2 commands with assistance (at baseline), tracks aross room  Exotropia at baseline, no facial droop, tongue midline, no facial weakness  Moves all extremities equally  Nonambulatory     Labs:     CBC Full  -  ( 06 Jun 2018 16:59 )  WBC Count : 6.78 K/uL  Hemoglobin : 13.1 g/dL  Hematocrit : 40.8 %  Platelet Count - Automated : 181 K/uL  Mean Cell Volume : 100.0 fL  Mean Cell Hemoglobin : 32.1 pg  Mean Cell Hemoglobin Concentration : 32.1 %  Auto Neutrophil # : 3.81 K/uL  Auto Lymphocyte # : 2.05 K/uL  Auto Monocyte # : 0.71 K/uL  Auto Eosinophil # : 0.12 K/uL  Auto Basophil # : 0.03 K/uL  Auto Neutrophil % : 56.2 %  Auto Lymphocyte % : 30.2 %  Auto Monocyte % : 10.5 %  Auto Eosinophil % : 1.8 %  Auto Basophil % : 0.4 %

## 2018-06-06 NOTE — ED PROVIDER NOTE - OBJECTIVE STATEMENT
(Pt non verbal.  Aid currently with patient has little information, another Aid on way with paperwork)    Pt is a 52 yo female, hx CP and seizure disorder comes to ED from group home after having 3 seizures in the past 12 hours, starting at 3;30am this momirng  Aid describes the seizure as twitching of the eye, and full body jerking sensations.  Deniees any LOC, but ater each seizure it took longer for patient to return to her baseline. Last seizure about 12:30 this afternoon. which took about 20 minutes to return to baseline.   Pt takes meds daily (aid unsure names) which she has not missed.  Pt currently at her baseline, which is non verbal and combative at times. No documented fevers, chills.    ( Pt refusing exam , only allowing heart and lung check) (Pt non verbal.  Aid currently with patient has little information, another Aid on way with paperwork)    Pt is a 54 yo female, hx CP and seizure disorder comes to ED from group home after having 3 seizures in the past 12 hours, starting at 3;30am this momirng  Aid describes the seizure as twitching of the eye, and full body jerking sensations.  Denies any LOC, but ater each seizure it took longer for patient to return to her baseline. Last seizure about 12:30 this afternoon. which took about 20 minutes to return to baseline.   Pt takes meds daily (aid unsure names) which she has not missed.  Pt currently at her baseline, which is non verbal and combative at times. No documented fevers, chills.    ( Pt refusing exam , only allowing heart and lung check)

## 2018-06-06 NOTE — ED ADULT NURSE NOTE - OBJECTIVE STATEMENT
Receive pt. in room 23 non verbal , confuse and combative, as stated by aide from [facility] where pt. reside this is her baseline behavior. Patient have a history of seizures. As stated by aide Julia at bedside" patient had 3 seizures at [facility] today each lasing about 20 seconds with her arms and upper body shaking". Patient is awake non verbal no indicator of pain, combative when care is been given. No seizure activities noted, labs sent will continue to monitor

## 2018-06-06 NOTE — ED PROVIDER NOTE - CARE PLAN
Principal Discharge DX:	Seizure Principal Discharge DX:	Seizure  Assessment and plan of treatment:	Follow up with your primary physician for a post hospital visit within 48 hours, taking al results to be reviewed. . We have changed your meication dosages Keppra and Depakote. To now take  Keppra 1 gram  twice a day and Depakote 500 mg twice a day .  We have sent those medications to [facility] care in [place] on [address].   Follow up in our epilepsy clinic in  2-3 weeks, call to make the appointment.  [phone] . If any recurrent, worsening, concerning or new signs or symptoms return to the ER   Fall/seizure precautions advised.

## 2018-06-06 NOTE — ED PROVIDER NOTE - PLAN OF CARE
Follow up with your primary physician for a post hospital visit within 48 hours, taking al results to be reviewed. . We have changed your meication dosages Keppra and Depakote. To now take  Keppra 1 gram  twice a day and Depakote 500 mg twice a day .  We have sent those medications to [facility] care in [place] on [address].   Follow up in our epilepsy clinic in  2-3 weeks, call to make the appointment.  [phone] . If any recurrent, worsening, concerning or new signs or symptoms return to the ER   Fall/seizure precautions advised.

## 2018-06-06 NOTE — ED PROVIDER NOTE - ATTENDING CONTRIBUTION TO CARE
Attending Statement: I have reviewed and agree with all pertinent clinical information, including history and physical exam and agree with treatment plan of the PA, except as noted.  54yo F Attending Statement: I have reviewed and agree with all pertinent clinical information, including history and physical exam and agree with treatment plan of the PA, except as noted.  54yo F hx of CP, seizure do unknown meds from group home pw "three seizures today over last 12 hours" as per aide w pt. PT nonverbal. no family w pt. Seizure described as "shaking all body" "lasting a few minutes" Last seizure at 1230pm pta, pt was postictal for approx 20 min. no head trauma. no recent illness. no fever. no vomit or diarrhea. no cough. unknown if any change in meds. "another aide is coming with pt med folder"   Vital signs noted. laying in bed, no distress. opens eyes to light touch, moan when touched and swings arms. no facial droop. no sign of facial trauma. normal S1-S2 no retractions. soft nondistended abdomen, no moaning or grimace with palpation.  pt nonverbal, min following commands  plan ct head, labs, urine, neurology cs, admit

## 2018-06-06 NOTE — ED ADULT TRIAGE NOTE - CHIEF COMPLAINT QUOTE
pt from group home, had 3 seizures in the past 12 hrs. as per AIDE at bedside, pt currently at baseline which is nonverbal and combative. pt refusing pulse ox and glucose check.

## 2018-06-06 NOTE — ED ADULT NURSE REASSESSMENT NOTE - NS ED NURSE REASSESS COMMENT FT1
pt nonverbal at baseline mental status as per aid at bedside. Pt in no acute distress VSS. Sent for ct at this time. Will continue to monitor/assess

## 2018-06-06 NOTE — ED PROVIDER NOTE - PROGRESS NOTE DETAILS
Neuro paged, pending CB Dr Neri, neurology, informed consult pending. Delay in labs due to poor access and pt moving. pending labs, urine, ct head and neurology cs  Endorsed to DR Oconnell Seen by neuro, will load keppra 1500mg times 1 and dc home on keppra 1 gram  BID and depakote 500 BID  cxr pending, . urine culture pending.  If cxr ok will dc home with epilepsy clinic fu 2-3 weeks, [phone]  Fall/seizure precautions advised. Seen by neuro, will load keppra 1500mg times 1 and dc home on keppra 1 gram  BID and depakote 500 BID  cxr pending, . CT head pending, urine culture pending.  If cxr ok will dc home with epilepsy clinic fu 2-3 weeks, [phone]  Fall/seizure precautions advised. cxr pre gomez ok, CT head pending.  Signed out to MD Curran who will continue to follow with Attending Kourtney Annette: Received sign out pending CTH. Initially CTH was indicated as "performed" and just waiting read. However, after delay in read I spoke w/ radiology resident who stated images never went through. Apparently CT was performed upstairs. I then spoke w/ CT tech Kermit who informed me that images arent there and that he has been trying to get in touch w/ tech who actually performed the scan but has been unable to. This is the first time I was notified. A-LOBO rosa aware. will rescan, reassess. [place]: Pt had witnessed generalized tonic clonic seizure, lasted ~1min, resolved w/ ativan. CT pending. given recurrent seizure, will admit for further optimazation. rediscussed w/ neuro. d/w dr. perrin, text paged mar.  on tele for closer obs. [place]: another <30 second episode of generalized tonic clonic shaking, self resolved. given 1 more ativan. updated mar.

## 2018-06-06 NOTE — CONSULT NOTE ADULT - ASSESSMENT
52 y/o F w/ CP and severe MR, intractable seizures (frequency 1-2x/mo as per outpatient Neurologist Dr Arana) presenting w/ multiple seizures, three in past 12 hours.       Plan  1. Load Keppra 1500X 1  2. C/w Keppra 1 gram BID, and Depakote 500 mg BID, obtain depakote level  3. CBC, CMP, Uculture, Urinalysis  4. R/o infectious etiology/urinary retention  5. Admit for VEEG monitoring given recent increase in episodes over past few months 52 y/o F w/ CP and severe MR, intractable seizures (frequency 1-2x/mo as per outpatient Neurologist Dr Arana) presenting w/ multiple seizures, three in past 12 hours. Seizure episodes likely in setting of recent medications changes of Keppra 1000 mg BID to Keppra 750 mg BID.       Plan  1. Load Keppra 1500X 1  2. C/w Keppra 1 gram BID, and Depakote 500 mg BID, obtain depakote level  3. CBC, CMP, Uculture, Urinalysis  4. R/o infectious etiology/urinary retention  5. Patient neurologically back to baseline at this time, after load can d/c patient with follow up at [facility] [phone] in 2-3 weeks.   6. Fall/seizure precautions advised

## 2018-06-07 DIAGNOSIS — G80.9 CEREBRAL PALSY, UNSPECIFIED: ICD-10-CM

## 2018-06-07 DIAGNOSIS — K59.00 CONSTIPATION, UNSPECIFIED: ICD-10-CM

## 2018-06-07 DIAGNOSIS — R94.31 ABNORMAL ELECTROCARDIOGRAM [ECG] [EKG]: ICD-10-CM

## 2018-06-07 DIAGNOSIS — Z29.9 ENCOUNTER FOR PROPHYLACTIC MEASURES, UNSPECIFIED: ICD-10-CM

## 2018-06-07 DIAGNOSIS — T68.XXXA HYPOTHERMIA, INITIAL ENCOUNTER: ICD-10-CM

## 2018-06-07 DIAGNOSIS — F39 UNSPECIFIED MOOD [AFFECTIVE] DISORDER: ICD-10-CM

## 2018-06-07 DIAGNOSIS — J45.20 MILD INTERMITTENT ASTHMA, UNCOMPLICATED: ICD-10-CM

## 2018-06-07 DIAGNOSIS — R56.9 UNSPECIFIED CONVULSIONS: ICD-10-CM

## 2018-06-07 LAB
ALBUMIN SERPL ELPH-MCNC: 3.2 G/DL — LOW (ref 3.3–5)
ALP SERPL-CCNC: 73 U/L — SIGNIFICANT CHANGE UP (ref 40–120)
ALT FLD-CCNC: 21 U/L — SIGNIFICANT CHANGE UP (ref 4–33)
AST SERPL-CCNC: 27 U/L — SIGNIFICANT CHANGE UP (ref 4–32)
BASOPHILS # BLD AUTO: 0.01 K/UL — SIGNIFICANT CHANGE UP (ref 0–0.2)
BASOPHILS NFR BLD AUTO: 0.2 % — SIGNIFICANT CHANGE UP (ref 0–2)
BILIRUB SERPL-MCNC: 0.2 MG/DL — SIGNIFICANT CHANGE UP (ref 0.2–1.2)
BUN SERPL-MCNC: 18 MG/DL — SIGNIFICANT CHANGE UP (ref 7–23)
CALCIUM SERPL-MCNC: 8.9 MG/DL — SIGNIFICANT CHANGE UP (ref 8.4–10.5)
CHLORIDE SERPL-SCNC: 100 MMOL/L — SIGNIFICANT CHANGE UP (ref 98–107)
CHOLEST SERPL-MCNC: 139 MG/DL — SIGNIFICANT CHANGE UP (ref 120–199)
CK MB BLD-MCNC: 1.41 NG/ML — SIGNIFICANT CHANGE UP (ref 1–4.7)
CK SERPL-CCNC: 32 U/L — SIGNIFICANT CHANGE UP (ref 25–170)
CK SERPL-CCNC: 33 U/L — SIGNIFICANT CHANGE UP (ref 25–170)
CO2 SERPL-SCNC: 33 MMOL/L — HIGH (ref 22–31)
CREAT SERPL-MCNC: 0.7 MG/DL — SIGNIFICANT CHANGE UP (ref 0.5–1.3)
EOSINOPHIL # BLD AUTO: 0.12 K/UL — SIGNIFICANT CHANGE UP (ref 0–0.5)
EOSINOPHIL NFR BLD AUTO: 2.4 % — SIGNIFICANT CHANGE UP (ref 0–6)
GLUCOSE SERPL-MCNC: 90 MG/DL — SIGNIFICANT CHANGE UP (ref 70–99)
HBA1C BLD-MCNC: 5.3 % — SIGNIFICANT CHANGE UP (ref 4–5.6)
HCT VFR BLD CALC: 37.1 % — SIGNIFICANT CHANGE UP (ref 34.5–45)
HDLC SERPL-MCNC: 47 MG/DL — SIGNIFICANT CHANGE UP (ref 45–65)
HGB BLD-MCNC: 11.7 G/DL — SIGNIFICANT CHANGE UP (ref 11.5–15.5)
IMM GRANULOCYTES # BLD AUTO: 0.04 # — SIGNIFICANT CHANGE UP
IMM GRANULOCYTES NFR BLD AUTO: 0.8 % — SIGNIFICANT CHANGE UP (ref 0–1.5)
LIPID PNL WITH DIRECT LDL SERPL: 76 MG/DL — SIGNIFICANT CHANGE UP
LYMPHOCYTES # BLD AUTO: 1.41 K/UL — SIGNIFICANT CHANGE UP (ref 1–3.3)
LYMPHOCYTES # BLD AUTO: 27.8 % — SIGNIFICANT CHANGE UP (ref 13–44)
MAGNESIUM SERPL-MCNC: 1.9 MG/DL — SIGNIFICANT CHANGE UP (ref 1.6–2.6)
MCHC RBC-ENTMCNC: 31.5 % — LOW (ref 32–36)
MCHC RBC-ENTMCNC: 32.8 PG — SIGNIFICANT CHANGE UP (ref 27–34)
MCV RBC AUTO: 103.9 FL — HIGH (ref 80–100)
MONOCYTES # BLD AUTO: 0.69 K/UL — SIGNIFICANT CHANGE UP (ref 0–0.9)
MONOCYTES NFR BLD AUTO: 13.6 % — SIGNIFICANT CHANGE UP (ref 2–14)
NEUTROPHILS # BLD AUTO: 2.8 K/UL — SIGNIFICANT CHANGE UP (ref 1.8–7.4)
NEUTROPHILS NFR BLD AUTO: 55.2 % — SIGNIFICANT CHANGE UP (ref 43–77)
NRBC # FLD: 0 — SIGNIFICANT CHANGE UP
PHOSPHATE SERPL-MCNC: 3.8 MG/DL — SIGNIFICANT CHANGE UP (ref 2.5–4.5)
PLATELET # BLD AUTO: 148 K/UL — LOW (ref 150–400)
PMV BLD: 10.4 FL — SIGNIFICANT CHANGE UP (ref 7–13)
POTASSIUM SERPL-MCNC: 3.3 MMOL/L — LOW (ref 3.5–5.3)
POTASSIUM SERPL-SCNC: 3.3 MMOL/L — LOW (ref 3.5–5.3)
PROT SERPL-MCNC: 6.9 G/DL — SIGNIFICANT CHANGE UP (ref 6–8.3)
RBC # BLD: 3.57 M/UL — LOW (ref 3.8–5.2)
RBC # FLD: 13.2 % — SIGNIFICANT CHANGE UP (ref 10.3–14.5)
SODIUM SERPL-SCNC: 145 MMOL/L — SIGNIFICANT CHANGE UP (ref 135–145)
SPECIMEN SOURCE: SIGNIFICANT CHANGE UP
SPECIMEN SOURCE: SIGNIFICANT CHANGE UP
TRIGL SERPL-MCNC: 103 MG/DL — SIGNIFICANT CHANGE UP (ref 10–149)
TROPONIN T SERPL-MCNC: < 0.06 NG/ML — SIGNIFICANT CHANGE UP (ref 0–0.06)
TROPONIN T SERPL-MCNC: < 0.06 NG/ML — SIGNIFICANT CHANGE UP (ref 0–0.06)
WBC # BLD: 5.07 K/UL — SIGNIFICANT CHANGE UP (ref 3.8–10.5)
WBC # FLD AUTO: 5.07 K/UL — SIGNIFICANT CHANGE UP (ref 3.8–10.5)

## 2018-06-07 PROCEDURE — 70450 CT HEAD/BRAIN W/O DYE: CPT | Mod: 26

## 2018-06-07 RX ORDER — ASPIRIN/CALCIUM CARB/MAGNESIUM 324 MG
81 TABLET ORAL DAILY
Refills: 0 | Status: DISCONTINUED | OUTPATIENT
Start: 2018-06-07 | End: 2018-06-23

## 2018-06-07 RX ORDER — PETROLATUM,WHITE
1 JELLY (GRAM) TOPICAL
Refills: 0 | Status: DISCONTINUED | OUTPATIENT
Start: 2018-06-07 | End: 2018-06-23

## 2018-06-07 RX ORDER — LEVETIRACETAM 250 MG/1
1000 TABLET, FILM COATED ORAL ONCE
Refills: 0 | Status: COMPLETED | OUTPATIENT
Start: 2018-06-07 | End: 2018-06-07

## 2018-06-07 RX ORDER — IPRATROPIUM/ALBUTEROL SULFATE 18-103MCG
3 AEROSOL WITH ADAPTER (GRAM) INHALATION EVERY 6 HOURS
Refills: 0 | Status: DISCONTINUED | OUTPATIENT
Start: 2018-06-07 | End: 2018-06-16

## 2018-06-07 RX ORDER — POLYETHYLENE GLYCOL 3350 17 G/17G
17 POWDER, FOR SOLUTION ORAL DAILY
Refills: 0 | Status: DISCONTINUED | OUTPATIENT
Start: 2018-06-07 | End: 2018-06-09

## 2018-06-07 RX ORDER — DOCUSATE SODIUM 100 MG
300 CAPSULE ORAL AT BEDTIME
Refills: 0 | Status: DISCONTINUED | OUTPATIENT
Start: 2018-06-07 | End: 2018-06-09

## 2018-06-07 RX ORDER — THIAMINE MONONITRATE (VIT B1) 100 MG
100 TABLET ORAL DAILY
Refills: 0 | Status: DISCONTINUED | OUTPATIENT
Start: 2018-06-07 | End: 2018-06-09

## 2018-06-07 RX ORDER — MAGNESIUM HYDROXIDE 400 MG/1
30 TABLET, CHEWABLE ORAL AT BEDTIME
Refills: 0 | Status: DISCONTINUED | OUTPATIENT
Start: 2018-06-07 | End: 2018-06-09

## 2018-06-07 RX ORDER — OLANZAPINE 15 MG/1
7.5 TABLET, FILM COATED ORAL AT BEDTIME
Refills: 0 | Status: DISCONTINUED | OUTPATIENT
Start: 2018-06-07 | End: 2018-06-23

## 2018-06-07 RX ORDER — FOLIC ACID 0.8 MG
1 TABLET ORAL DAILY
Refills: 0 | Status: DISCONTINUED | OUTPATIENT
Start: 2018-06-07 | End: 2018-06-09

## 2018-06-07 RX ORDER — DIVALPROEX SODIUM 500 MG/1
500 TABLET, DELAYED RELEASE ORAL ONCE
Refills: 0 | Status: COMPLETED | OUTPATIENT
Start: 2018-06-07 | End: 2018-06-07

## 2018-06-07 RX ORDER — NYSTATIN CREAM 100000 [USP'U]/G
1 CREAM TOPICAL
Refills: 0 | Status: DISCONTINUED | OUTPATIENT
Start: 2018-06-07 | End: 2018-06-23

## 2018-06-07 RX ORDER — CARBAMAZEPINE 200 MG
100 TABLET ORAL DAILY
Refills: 0 | Status: DISCONTINUED | OUTPATIENT
Start: 2018-06-07 | End: 2018-06-07

## 2018-06-07 RX ORDER — LEVETIRACETAM 250 MG/1
1000 TABLET, FILM COATED ORAL
Refills: 0 | Status: DISCONTINUED | OUTPATIENT
Start: 2018-06-07 | End: 2018-06-08

## 2018-06-07 RX ORDER — CARBAMAZEPINE 200 MG
400 TABLET ORAL
Refills: 0 | Status: DISCONTINUED | OUTPATIENT
Start: 2018-06-07 | End: 2018-06-07

## 2018-06-07 RX ORDER — FERROUS SULFATE 325(65) MG
325 TABLET ORAL DAILY
Refills: 0 | Status: DISCONTINUED | OUTPATIENT
Start: 2018-06-07 | End: 2018-06-09

## 2018-06-07 RX ORDER — FLUTICASONE PROPIONATE 50 MCG
1 SPRAY, SUSPENSION NASAL
Refills: 0 | Status: DISCONTINUED | OUTPATIENT
Start: 2018-06-07 | End: 2018-06-23

## 2018-06-07 RX ORDER — BENZTROPINE MESYLATE 1 MG
0.5 TABLET ORAL
Refills: 0 | Status: DISCONTINUED | OUTPATIENT
Start: 2018-06-07 | End: 2018-06-18

## 2018-06-07 RX ORDER — DIVALPROEX SODIUM 500 MG/1
500 TABLET, DELAYED RELEASE ORAL
Refills: 0 | Status: DISCONTINUED | OUTPATIENT
Start: 2018-06-07 | End: 2018-06-08

## 2018-06-07 RX ORDER — POTASSIUM CHLORIDE 20 MEQ
40 PACKET (EA) ORAL ONCE
Refills: 0 | Status: DISCONTINUED | OUTPATIENT
Start: 2018-06-07 | End: 2018-06-08

## 2018-06-07 RX ORDER — ENOXAPARIN SODIUM 100 MG/ML
40 INJECTION SUBCUTANEOUS DAILY
Refills: 0 | Status: DISCONTINUED | OUTPATIENT
Start: 2018-06-07 | End: 2018-06-23

## 2018-06-07 RX ORDER — SENNA PLUS 8.6 MG/1
2 TABLET ORAL AT BEDTIME
Refills: 0 | Status: DISCONTINUED | OUTPATIENT
Start: 2018-06-07 | End: 2018-06-09

## 2018-06-07 RX ORDER — FUROSEMIDE 40 MG
40 TABLET ORAL DAILY
Refills: 0 | Status: DISCONTINUED | OUTPATIENT
Start: 2018-06-07 | End: 2018-06-09

## 2018-06-07 RX ORDER — LACTULOSE 10 G/15ML
10 SOLUTION ORAL
Refills: 0 | Status: DISCONTINUED | OUTPATIENT
Start: 2018-06-07 | End: 2018-06-23

## 2018-06-07 RX ORDER — PANTOPRAZOLE SODIUM 20 MG/1
40 TABLET, DELAYED RELEASE ORAL
Refills: 0 | Status: DISCONTINUED | OUTPATIENT
Start: 2018-06-07 | End: 2018-06-09

## 2018-06-07 RX ORDER — BUDESONIDE, MICRONIZED 100 %
0.5 POWDER (GRAM) MISCELLANEOUS DAILY
Refills: 0 | Status: DISCONTINUED | OUTPATIENT
Start: 2018-06-07 | End: 2018-06-23

## 2018-06-07 RX ADMIN — NYSTATIN CREAM 1 APPLICATION(S): 100000 CREAM TOPICAL at 18:34

## 2018-06-07 RX ADMIN — LACTULOSE 10 GRAM(S): 10 SOLUTION ORAL at 21:41

## 2018-06-07 RX ADMIN — Medication 1 MILLIGRAM(S): at 02:21

## 2018-06-07 RX ADMIN — Medication 1 APPLICATION(S): at 21:50

## 2018-06-07 RX ADMIN — DIVALPROEX SODIUM 500 MILLIGRAM(S): 500 TABLET, DELAYED RELEASE ORAL at 10:58

## 2018-06-07 RX ADMIN — Medication 1 SPRAY(S): at 22:54

## 2018-06-07 RX ADMIN — Medication 2 DROP(S): at 21:33

## 2018-06-07 RX ADMIN — PANTOPRAZOLE SODIUM 40 MILLIGRAM(S): 20 TABLET, DELAYED RELEASE ORAL at 10:58

## 2018-06-07 RX ADMIN — Medication 1 MILLIGRAM(S): at 10:58

## 2018-06-07 RX ADMIN — Medication 325 MILLIGRAM(S): at 10:58

## 2018-06-07 RX ADMIN — Medication 0.5 MILLIGRAM(S): at 21:33

## 2018-06-07 RX ADMIN — Medication 2 DROP(S): at 18:22

## 2018-06-07 RX ADMIN — Medication 1 TABLET(S): at 21:33

## 2018-06-07 RX ADMIN — SENNA PLUS 2 TABLET(S): 8.6 TABLET ORAL at 21:33

## 2018-06-07 RX ADMIN — Medication 1 APPLICATION(S): at 18:34

## 2018-06-07 RX ADMIN — Medication 0.5 MILLIGRAM(S): at 11:06

## 2018-06-07 RX ADMIN — LEVETIRACETAM 1000 MILLIGRAM(S): 250 TABLET, FILM COATED ORAL at 21:33

## 2018-06-07 RX ADMIN — Medication 1 APPLICATION(S): at 21:33

## 2018-06-07 RX ADMIN — Medication 1 ENEMA: at 18:35

## 2018-06-07 RX ADMIN — Medication 1 MILLIGRAM(S): at 02:40

## 2018-06-07 RX ADMIN — DIVALPROEX SODIUM 500 MILLIGRAM(S): 500 TABLET, DELAYED RELEASE ORAL at 21:33

## 2018-06-07 RX ADMIN — LEVETIRACETAM 1000 MILLIGRAM(S): 250 TABLET, FILM COATED ORAL at 10:58

## 2018-06-07 RX ADMIN — Medication 1 MILLIGRAM(S): at 05:37

## 2018-06-07 RX ADMIN — OLANZAPINE 7.5 MILLIGRAM(S): 15 TABLET, FILM COATED ORAL at 21:50

## 2018-06-07 RX ADMIN — Medication 81 MILLIGRAM(S): at 10:58

## 2018-06-07 RX ADMIN — Medication 100 MILLIGRAM(S): at 10:58

## 2018-06-07 RX ADMIN — Medication 300 MILLIGRAM(S): at 21:33

## 2018-06-07 RX ADMIN — POLYETHYLENE GLYCOL 3350 17 GRAM(S): 17 POWDER, FOR SOLUTION ORAL at 10:58

## 2018-06-07 RX ADMIN — ENOXAPARIN SODIUM 40 MILLIGRAM(S): 100 INJECTION SUBCUTANEOUS at 10:58

## 2018-06-07 NOTE — H&P ADULT - ATTENDING COMMENTS
Seen and examined . Known to my service . readmitted with recurrent seizures and abnormal EKG . Neurology helping . cardiology consulted.

## 2018-06-07 NOTE — H&P ADULT - NEGATIVE GENERAL SYMPTOMS
no fever/no chills/no sweating/no anorexia/no weight loss/no weight gain/no polyphagia/no polyuria/no polydipsia/no malaise/no fatigue

## 2018-06-07 NOTE — ED ADULT NURSE REASSESSMENT NOTE - NS ED NURSE REASSESS COMMENT FT1
pt had episode of seizing at 535am; 1mg ativan administered as per order. MD Bryant at bedside. Seizure precautions in place and safety maintained. Seizure lasted for 1 minute. Aid at bedside. Will continue to monitor/assess pt had episode of seizing at 535am; 1mg ativan administered as per order. During seizure pt right eye twitching, bilateral upper extremities contracted and shaking.  No aspiration, no tounge biting. MD Bryant at bedside. Seizure precautions in place and safety maintained. Seizure lasted for 1 minute. Aid at bedside. Will continue to monitor/assess

## 2018-06-07 NOTE — H&P ADULT - HISTORY OF PRESENT ILLNESS
52 y/o female with PmHx of mental retardation, cerebral palsy and seizures presented to [facility] ED after 4 seizures in less than 24 hours. Seizures occurred yesterday at ~0200, ~0700, ~1200 and today at ~0500. With each seizure the pt was described as being more confused than baseline for about 5 minutes as per [facility]. This seizure activity was the same as previous seizures witnessed by RN and other staff. Pt normally has seizure activity 1-2 per month. There was also incontinence with each seizure activity, but pt is normally incontinent for urine and stool. After seizure this morning pt was brought to [facility] ED.     Pt had similar seizures last month and she was brought to the ED. Pt was discharged with an increase in keppra dose to 1000 mg. Since that time she saw her PCP around May 30th per facility [facility] and the PCP lowered the pt's Keppra dose from 1000 mg to 750mg.     [facility] denies any recent travel, evidence of recent infections, or changes in other medications. 54 y/o female with PmHx of mental retardation, cerebral palsy and seizures presented to [facility] ED after 4 seizures in less than 24 hours. Seizures occurred yesterday at ~0200, ~0700, ~1200 and today at ~0500. With each seizure the pt was described as being more confused than baseline for about 5 minutes as per [facility]. This seizure activity was the same as previous seizures witnessed by RN and other staff. Pt normally has seizure activity 1-2 per month. There was also incontinence with each seizure activity, but pt is normally incontinent for urine and stool. After seizure this morning pt was brought to [facility] ED.     Pt had similar seizures last month and she was brought to the ED. Pt was discharged with an increase in keppra dose to 1000 mg. Since that time she saw her PCP around May 30th per [facility] and the PCP lowered the pt's Keppra dose from 1000 mg to 750mg due to her Keppra levels being too high.     [facility] denies any recent travel, evidence of recent infections, syncope, weight changes, night sweats, PND, vomiting, constipation, trauma or changes in other medications. 54 y/o female with PmHx of cerebral palsy, mental retardation, seizures and is non-verbal presented to [facility] ED after 4 seizures in less than 24 hours. Seizures occurred yesterday at ~0200, ~0700, ~1200 and today at ~0500. With each seizure the pt was described as being more confused than baseline for about 5 minutes as per [facility]. This seizure activity was the same as previous seizures witnessed by RN and other staff. Pt normally has seizure activity 1-2 per month. There was also incontinence with each seizure activity, but pt is normally incontinent for urine and stool. After seizure this morning pt was brought to [facility] ED.     Pt had similar seizures last month and she was brought to the ED. Pt was discharged with an increase in keppra dose to 1000 mg. Since that time she saw her PCP around May 30th per [facility] and the PCP lowered the pt's Keppra dose from 1000 mg to 750mg due to her Keppra levels being too high.     [facility] denies any recent travel, evidence of recent infections, syncope, weight changes, night sweats, PND, vomiting, constipation, trauma or changes in other medications. 54 y/o female with PmHx of cerebral palsy, intellectual disability, seizures and is non-verbal presented to [facility] ED after 4 seizures in less than 24 hours. Seizures occurred yesterday at ~0200, ~0700, ~1200 and today at ~0500. With each seizure the pt was described as being more confused than baseline for about 5 minutes as per facility RN. This seizure activity was the same as previous seizures witnessed by RN and other staff. Pt normally has seizure activity 1-2x per month. There was also incontinence with each seizure activity, but pt is normally incontinent for urine and stool.     Pt had similar seizures last month and she was hospitalized from 5/8-11. Pt was discharged with an increased keppra dose to 1000 mg BID and was also started on Depakaote 500mg BID in place of her carbamzepine. Since that time she saw her PCP around May 30th per facility RN and the PCP lowered the pt's Keppra dose from 1000 mg to 750mg due to her Keppra levels being too high and also continued her carbamazepine with depakote reportedly.     Facility RN denies any recent travel, evidence of recent infections, syncope, weight changes, night sweats, PND, vomiting, constipation, trauma or changes in other medications.

## 2018-06-07 NOTE — H&P ADULT - PROBLEM SELECTOR PLAN 2
Wheel chair bound. Fall precautions. Continue Cogentin. S&S evaluation.  Quarter sized food diet. Pt incontinent and requires briefs. Warming [place], F/U Blood Cx, Urine Cx. Will hold off Abx for now as d/w Attending

## 2018-06-07 NOTE — H&P ADULT - NSHPSOCIALHISTORY_GEN_ALL_CORE
Single, living in group home, mental retardation.  Tobacco- Never.  Drugs- Never.  ETOH- Never.  Not sexually active.

## 2018-06-07 NOTE — H&P ADULT - PROBLEM SELECTOR PLAN 4
DVT prophylaxis with enoxaparin. Continue ASA, furosemide, Nexium, Flonase, calcium + vitamin D, ferrous sulfate, folic acid, multivitamin and vitamin B1. DVT prophylaxis with enoxaparin. Continue ASA, furosemide, Nexium, Flonase, calcium + vitamin D, ferrous sulfate, folic acid, multivitamin and vitamin B1.  Blood and urine cultures. Wheel chair bound. Fall precautions. Continue Cogentin. Pt incontinent and requires briefs.

## 2018-06-07 NOTE — CONSULT NOTE ADULT - SUBJECTIVE AND OBJECTIVE BOX
HISTORY OF PRESENT ILLNESS: 54 y/o female with PmHx of cerebral palsy, intellectual disability, seizures and is non-verbal presented to [facility] ED after 4 seizures in less than 24 hours. Seizures occurred yesterday at ~0200, ~0700, ~1200 and today at ~0500. With each seizure the pt was described as being more confused than baseline for about 5 minutes as per facility RN. This seizure activity was the same as previous seizures witnessed by RN and other staff. Pt normally has seizure activity 1-2x per month. There was also incontinence with each seizure activity, but pt is normally incontinent for urine and stool.     Pt had similar seizures last month and she was hospitalized from 5/8-11. Pt was discharged with an increased keppra dose to 1000 mg BID and was also started on Depakaote 500mg BID in place of her carbamzepine. Since that time she saw her PCP around May 30th per facility RN and the PCP lowered the pt's Keppra dose from 1000 mg to 750mg due to her Keppra levels being too high and also continued her carbamazepine with depakote reportedly.   Information obtained from chart     PAST MEDICAL & SURGICAL HISTORY:  Intellectual disability  Constipation  Seizure disorder  Cerebral palsy  No significant past surgical history    PREVIOUS DIAGNOSTIC TESTING:  None in [place]   [ ] Echocardiogram:  [ ]  Catheterization:  [ ] Stress Test:  	    MEDICATIONS:  aspirin enteric coated 81 milliGRAM(s) Oral daily  enoxaparin Injectable 40 milliGRAM(s) SubCutaneous daily  furosemide    Tablet 40 milliGRAM(s) Oral daily      ALBUTerol/ipratropium for Nebulization 3 milliLiter(s) Nebulizer every 6 hours PRN  buDESOnide   0.5 milliGRAM(s) Respule 0.5 milliGRAM(s) Inhalation daily    benztropine 0.5 milliGRAM(s) Oral two times a day  diVALproex  milliGRAM(s) Oral two times a day  levETIRAcetam 1000 milliGRAM(s) Oral two times a day  OLANZapine 7.5 milliGRAM(s) Oral at bedtime    docusate sodium 300 milliGRAM(s) Oral at bedtime  lactulose Syrup 10 Gram(s) Oral two times a day  magnesium hydroxide Suspension 30 milliLiter(s) Oral at bedtime PRN  pantoprazole    Tablet 40 milliGRAM(s) Oral before breakfast  polyethylene glycol 3350 17 Gram(s) Oral daily  senna 2 Tablet(s) Oral at bedtime  sodium biphosphate Rectal Enema 1 Enema Rectal daily      AQUAPHOR (petrolatum Ointment) 1 Application(s) Topical two times a day  artificial  tears Solution 2 Drop(s) Both EYES three times a day  calcium carbonate 1250 mG + Vitamin D (OsCal 500 + D) 1 Tablet(s) Oral two times a day  clotrimazole 1% Cream 1 Application(s) Topical two times a day  ferrous    sulfate 325 milliGRAM(s) Oral daily  fluticasone propionate 50 MICROgram(s)/spray Nasal Spray 1 Spray(s) Both Nostrils two times a day  folic acid 1 milliGRAM(s) Oral daily  multivitamin 1 Tablet(s) Oral daily  nystatin Powder 1 Application(s) Topical two times a day  petrolatum Ophthalmic Ointment 1 Application(s) Both EYES two times a day  thiamine 100 milliGRAM(s) Oral daily      Allergies    Topamax (Unknown)    Intolerances        FAMILY HISTORY:  No pertinent family history in first degree relatives      SOCIAL HISTORY:  as per chart   [ x] Non-smoker  [ ] Former Smoker  [ ] Current Smoker  [ ] Alcohol      REVIEW OF SYSTEMS:  [ ]chest pain  [  ]shortness of breath  [  ]palpitations  [  ]syncope  [ ]near syncope [  ]diplopia  [  ]altered mental status   [  ]fevers  [ ]chills [ ]nausea  [ ] vomiting  [ ]abdominal pain  [ ]melena  [ ]BRBPR  [  ]epistaxis  [  ]rash  [  ]lower extremity edema          [ ] All others negative	  [x ] Unable to obtain    PHYSICAL EXAM:  T(C): 34.7 (06-07-18 @ 09:53), Max: 36.9 (06-06-18 @ 20:27)  HR: 64 (06-07-18 @ 11:07) (64 - 76)  BP: 125/69 (06-07-18 @ 09:53) (99/60 - 159/94)  RR: 17 (06-07-18 @ 09:53) (16 - 18)  SpO2: 98% (06-07-18 @ 09:53) (95% - 98%)  Wt(kg): --  I&O's Summary      Appearance: No Signs of acute distress  HEENT:   Normal oral mucosa, PERRL	  Cardiovascular: Normal S1 S2, No JVD, No murmurs,   Respiratory: Lungs clear to auscultation	  Gastrointestinal:  Soft, Non-tender, + BS	  Skin: No rashes, No ecchymoses, No cyanosis	  Extremities:  No clubbing, cyanosis or edema  Vascular: Peripheral pulses palpable 2+ bilaterally    TELEMETRY: 	    ECG: NSR @ 69 TWI v1-6 ?new changes in v4-6 	  RADIOLOGY:  < from: Xray Chest 1 View- PORTABLE-Urgent (06.06.18 @ 18:48) >  IMPRESSION:  Chin overlie and obscures portion of lung apices.    Uniform hazy opacity in peripheral lower left hemithorax obscuring   lateral left hemidiaphragm CP angle and left heart border related to   prominent epicardial fat as demonstrated on chestCT from 5/9/2018.     Sharp right CP angle. Clear remaining visualized lungs. No pneumothorax.    Heart size and mediastinal width inaccurately assessed on this projection.    Spinal degenerative changes again noted.    < end of copied text >    < from: CT Head No Cont (06.07.18 @ 02:40) >  Impression:     No acute intracranial hemorrhage, large cortical infarct or mass effect.   No significant interval change since 5/8/2018. If clinically indicated,   short-term follow-up or MRI may be obtained for further evaluation.    < end of copied text >    OTHER: 	  	  LABS:	 	    CARDIAC MARKERS:                          11.7   5.07  )-----------( 148      ( 07 Jun 2018 10:59 )             37.1     06-07    145  |  100  |  18  ----------------------------<  90  3.3<L>   |  33<H>  |  0.70    Ca    8.9      07 Jun 2018 10:59  Phos  3.8     06-07  Mg     1.9     06-07    TPro  6.9  /  Alb  3.2<L>  /  TBili  0.2  /  DBili  x   /  AST  27  /  ALT  21  /  AlkPhos  73  06-07    proBNP:   Lipid Profile:   HgA1c: Hemoglobin A1C, Whole Blood: 5.3 % (06-07 @ 10:59)    TSH:     ASSESSMENT/PLAN: 54 y/o female with PmHx of cerebral palsy, intellectual disability, seizures and is non-verbal presented to [facility] ED after 4 seizures in less than 24 hours.  called for abnormal EKG   would repeat 12 lead EKG   CE neg x 1   trend 2nd enzyme   check echo   hypokalemic    supplement K as per primary team   monitor on tele   F/u neuro recommendations

## 2018-06-07 NOTE — H&P ADULT - PMH
Cerebral palsy    Constipation    Seizure disorder Cerebral palsy    Constipation    Intellectual disability    Seizure disorder

## 2018-06-07 NOTE — H&P ADULT - PROBLEM SELECTOR PLAN 3
Continue ventolin PRN Monitor on Telemetry, F/U Serial CE's, Check Echo, Cardiology c/s to be called by MD

## 2018-06-07 NOTE — H&P ADULT - PROBLEM SELECTOR PLAN 8
DVT prophylaxis with enoxaparin. Continue ASA, furosemide, Nexium, Flonase, calcium + vitamin D, ferrous sulfate, folic acid, multivitamin and vitamin B1.

## 2018-06-07 NOTE — H&P ADULT - MS EXT PE MLT D E PC
Edema in feet which is normal per facility Aid./normal/no clubbing/no cyanosis normal/no clubbing/no cyanosis/pedal edema

## 2018-06-07 NOTE — H&P ADULT - ASSESSMENT
52 y/o female with PmHx of cerebral palsy and seizures had 4 seizure-like episodes in less than 24 hours.    Vital Signs Last 24 Hrs  T(C): 35.8 (07 Jun 2018 09:02), Max: 36.9 (06 Jun 2018 20:27)  T(F): 96.4 (07 Jun 2018 09:02), Max: 98.5 (06 Jun 2018 20:27)  HR: 72 (07 Jun 2018 09:02) (70 - 76)  BP: 99/60 (07 Jun 2018 09:02) (99/60 - 159/94)  BP(mean): --  RR: 16 (07 Jun 2018 09:02) (16 - 18)  SpO2: 96% (07 Jun 2018 09:02) (95% - 98%)    CT: There is evidence of a small focus of decreased attenuation seen   involving the superior medial left parietal cortex. Small areas seen   involving the superior medial right parietal cortex. These findings are   unchanged when compared prior exam and could be compatible areas of   gliosis secondary to old infarcts. MRI can be done to better characterize   this finding.  There is no evidence of acute hemorrhage mass or mass effect seen.  The visualized paranasal sinuses mastoid and middle ear regions appear   clear.  Impression: Stable exam.    CXR: "Lungs clear"    EKG: ... 54 y/o female with PmHx of cerebral palsy and seizures had 4 seizure-like episodes in less than 24 hours.    EKG: NSR @ 69 TWI v1-6 ?new changes in v4-6    Addendum- called by RN regarding pt with 45sec seizure witnessed by staff with hypothermia 94.5 MI

## 2018-06-07 NOTE — H&P ADULT - NEGATIVE ALLERGY TYPES
no outdoor environmental allergies/no indoor environmental allergies/no reactions to food/no reactions to animals/no reactions to insect bites

## 2018-06-07 NOTE — ED ADULT NURSE REASSESSMENT NOTE - NS ED NURSE REASSESS COMMENT FT1
pt refusing vitals at this time combative with PCA, RN and aid at bedside. aid remains at bedside, pt at baseline mental status no seizure activity noted. Will continue to monitor/assess

## 2018-06-07 NOTE — H&P ADULT - MUSCULOSKELETAL
normal/no joint swelling/no joint erythema/no joint warmth/no calf tenderness details… detailed exam

## 2018-06-07 NOTE — ED ADULT NURSE REASSESSMENT NOTE - NS ED NURSE REASSESS COMMENT FT1
at 213 am pt had seizure activity that lasted for 2 minutes, seizure precautions in place. MD Bryant at bedside. Safety maintained. Pt sent for stat ct scan at 222am.  As per md Bryant no images were available for patient at 2am; no provider was notified that images were not available for ct.  Incident report filed at this time.  Pt with CARON Hurtado at ct area for safety. at 213 am pt had seizure activity that lasted for 2 minutes, seizure precautions in place. During seizure pt right eye twitching, bilateral upper extremities contracted and shaking.  No aspiration, no tounge biting. MD Bryant at bedside. Safety maintained. Pt sent for stat ct scan at 222am.  As per md Bryant no images were available for patient at 2am; no provider was notified that images were not available for ct.  Incident report filed at this time.  Pt with CARON Hurtado at ct area for safety.

## 2018-06-07 NOTE — H&P ADULT - NSHPOUTPATIENTPROVIDERS_GEN_ALL_CORE
PCP- PCP-Dr Adeel Garcia  Cardiologist- Dr Goldberg  Neurologist- Dr Arana  Ophthalmologist- Eptinin

## 2018-06-07 NOTE — H&P ADULT - NEGATIVE PSYCHIATRIC SYMPTOMS
no suicidal ideation/no depression/no anxiety/no insomnia/no memory loss/no paranoia/no visual hallucinations/no auditory hallucinations/no hyperactivity

## 2018-06-07 NOTE — H&P ADULT - NEGATIVE GASTROINTESTINAL SYMPTOMS
no nausea/no vomiting/no diarrhea/no constipation/no change in bowel habits/no melena/no hematochezia/no steatorrhea/no jaundice

## 2018-06-07 NOTE — H&P ADULT - NEGATIVE SKIN SYMPTOMS
no rash/no itching/no dryness/no change in size/color of mole/no tumor/no brittle nails/no pitted nails/no hair loss

## 2018-06-07 NOTE — H&P ADULT - PROBLEM SELECTOR PLAN 1
Load Keppra 1500 mg once. Increase Keppra 1000 mg BID.  Continue Depakote 500 mg BID, obtain Depakote trough levels. Continue valproic acid and Levetiracetam. Consider neuro consult and referral for neurologist closer to her facility. Load Keppra 1500 mg once. Increase Keppra 1000 mg BID.  Continue Depakote, . Continue valproic acid and Levetiracetam. Consider neuro consult and referral for neurologist closer to her facility.  Obtain Depakote blood trough levels Neuro c/s in chart- Loaded with Keppra 1500 mg once. Increase Keppra 1000 mg BID. Continue Depakote 500mg BID.

## 2018-06-08 LAB
BACTERIA UR CULT: SIGNIFICANT CHANGE UP
BASOPHILS # BLD AUTO: 0.04 K/UL — SIGNIFICANT CHANGE UP (ref 0–0.2)
BASOPHILS NFR BLD AUTO: 0.8 % — SIGNIFICANT CHANGE UP (ref 0–2)
BUN SERPL-MCNC: 17 MG/DL — SIGNIFICANT CHANGE UP (ref 7–23)
CALCIUM SERPL-MCNC: 8.7 MG/DL — SIGNIFICANT CHANGE UP (ref 8.4–10.5)
CHLORIDE SERPL-SCNC: 101 MMOL/L — SIGNIFICANT CHANGE UP (ref 98–107)
CO2 SERPL-SCNC: 33 MMOL/L — HIGH (ref 22–31)
CREAT SERPL-MCNC: 0.77 MG/DL — SIGNIFICANT CHANGE UP (ref 0.5–1.3)
EOSINOPHIL # BLD AUTO: 0.14 K/UL — SIGNIFICANT CHANGE UP (ref 0–0.5)
EOSINOPHIL NFR BLD AUTO: 2.8 % — SIGNIFICANT CHANGE UP (ref 0–6)
GLUCOSE SERPL-MCNC: 75 MG/DL — SIGNIFICANT CHANGE UP (ref 70–99)
HCT VFR BLD CALC: 36.8 % — SIGNIFICANT CHANGE UP (ref 34.5–45)
HGB BLD-MCNC: 11.8 G/DL — SIGNIFICANT CHANGE UP (ref 11.5–15.5)
IMM GRANULOCYTES # BLD AUTO: 0.06 # — SIGNIFICANT CHANGE UP
IMM GRANULOCYTES NFR BLD AUTO: 1.2 % — SIGNIFICANT CHANGE UP (ref 0–1.5)
LYMPHOCYTES # BLD AUTO: 2.12 K/UL — SIGNIFICANT CHANGE UP (ref 1–3.3)
LYMPHOCYTES # BLD AUTO: 43 % — SIGNIFICANT CHANGE UP (ref 13–44)
MAGNESIUM SERPL-MCNC: 2.1 MG/DL — SIGNIFICANT CHANGE UP (ref 1.6–2.6)
MCHC RBC-ENTMCNC: 32.1 % — SIGNIFICANT CHANGE UP (ref 32–36)
MCHC RBC-ENTMCNC: 32.2 PG — SIGNIFICANT CHANGE UP (ref 27–34)
MCV RBC AUTO: 100.5 FL — HIGH (ref 80–100)
MONOCYTES # BLD AUTO: 0.49 K/UL — SIGNIFICANT CHANGE UP (ref 0–0.9)
MONOCYTES NFR BLD AUTO: 9.9 % — SIGNIFICANT CHANGE UP (ref 2–14)
NEUTROPHILS # BLD AUTO: 2.08 K/UL — SIGNIFICANT CHANGE UP (ref 1.8–7.4)
NEUTROPHILS NFR BLD AUTO: 42.3 % — LOW (ref 43–77)
NRBC # FLD: 0 — SIGNIFICANT CHANGE UP
PHOSPHATE SERPL-MCNC: 3.8 MG/DL — SIGNIFICANT CHANGE UP (ref 2.5–4.5)
PLATELET # BLD AUTO: 143 K/UL — LOW (ref 150–400)
PMV BLD: 10.3 FL — SIGNIFICANT CHANGE UP (ref 7–13)
POTASSIUM SERPL-MCNC: 3.3 MMOL/L — LOW (ref 3.5–5.3)
POTASSIUM SERPL-SCNC: 3.3 MMOL/L — LOW (ref 3.5–5.3)
RBC # BLD: 3.66 M/UL — LOW (ref 3.8–5.2)
RBC # FLD: 13.6 % — SIGNIFICANT CHANGE UP (ref 10.3–14.5)
SODIUM SERPL-SCNC: 144 MMOL/L — SIGNIFICANT CHANGE UP (ref 135–145)
SPECIMEN SOURCE: SIGNIFICANT CHANGE UP
VALPROATE SERPL-MCNC: 72.2 UG/ML — SIGNIFICANT CHANGE UP (ref 50–100)
WBC # BLD: 4.93 K/UL — SIGNIFICANT CHANGE UP (ref 3.8–10.5)
WBC # FLD AUTO: 4.93 K/UL — SIGNIFICANT CHANGE UP (ref 3.8–10.5)

## 2018-06-08 PROCEDURE — 76937 US GUIDE VASCULAR ACCESS: CPT | Mod: 26

## 2018-06-08 PROCEDURE — 99291 CRITICAL CARE FIRST HOUR: CPT

## 2018-06-08 PROCEDURE — 36000 PLACE NEEDLE IN VEIN: CPT

## 2018-06-08 PROCEDURE — 93010 ELECTROCARDIOGRAM REPORT: CPT

## 2018-06-08 RX ORDER — LEVETIRACETAM 250 MG/1
1000 TABLET, FILM COATED ORAL EVERY 12 HOURS
Refills: 0 | Status: DISCONTINUED | OUTPATIENT
Start: 2018-06-08 | End: 2018-06-09

## 2018-06-08 RX ORDER — SODIUM CHLORIDE 9 MG/ML
1000 INJECTION, SOLUTION INTRAVENOUS
Refills: 0 | Status: DISCONTINUED | OUTPATIENT
Start: 2018-06-08 | End: 2018-06-15

## 2018-06-08 RX ORDER — FOSPHENYTOIN 50 MG/ML
500 INJECTION INTRAMUSCULAR; INTRAVENOUS
Refills: 0 | Status: DISCONTINUED | OUTPATIENT
Start: 2018-06-08 | End: 2018-06-09

## 2018-06-08 RX ORDER — POTASSIUM CHLORIDE 20 MEQ
40 PACKET (EA) ORAL ONCE
Refills: 0 | Status: COMPLETED | OUTPATIENT
Start: 2018-06-08 | End: 2018-06-08

## 2018-06-08 RX ORDER — POTASSIUM CHLORIDE 20 MEQ
40 PACKET (EA) ORAL EVERY 4 HOURS
Refills: 0 | Status: DISCONTINUED | OUTPATIENT
Start: 2018-06-08 | End: 2018-06-08

## 2018-06-08 RX ORDER — VALPROIC ACID (AS SODIUM SALT) 250 MG/5ML
500 SOLUTION, ORAL ORAL
Refills: 0 | Status: DISCONTINUED | OUTPATIENT
Start: 2018-06-08 | End: 2018-06-09

## 2018-06-08 RX ORDER — FOSPHENYTOIN 50 MG/ML
1500 INJECTION INTRAMUSCULAR; INTRAVENOUS ONCE
Refills: 0 | Status: COMPLETED | OUTPATIENT
Start: 2018-06-08 | End: 2018-06-08

## 2018-06-08 RX ADMIN — Medication 1 APPLICATION(S): at 18:18

## 2018-06-08 RX ADMIN — NYSTATIN CREAM 1 APPLICATION(S): 100000 CREAM TOPICAL at 06:00

## 2018-06-08 RX ADMIN — Medication 1 ENEMA: at 11:48

## 2018-06-08 RX ADMIN — Medication 1 APPLICATION(S): at 06:00

## 2018-06-08 RX ADMIN — Medication 100 MILLIGRAM(S): at 11:48

## 2018-06-08 RX ADMIN — FOSPHENYTOIN 160 MILLIGRAM(S) PE: 50 INJECTION INTRAMUSCULAR; INTRAVENOUS at 20:43

## 2018-06-08 RX ADMIN — Medication 0.5 MILLIGRAM(S): at 06:00

## 2018-06-08 RX ADMIN — DIVALPROEX SODIUM 500 MILLIGRAM(S): 500 TABLET, DELAYED RELEASE ORAL at 06:02

## 2018-06-08 RX ADMIN — Medication 1 SPRAY(S): at 06:00

## 2018-06-08 RX ADMIN — Medication 1 SPRAY(S): at 18:18

## 2018-06-08 RX ADMIN — Medication 1 APPLICATION(S): at 06:42

## 2018-06-08 RX ADMIN — LEVETIRACETAM 1000 MILLIGRAM(S): 250 TABLET, FILM COATED ORAL at 06:02

## 2018-06-08 RX ADMIN — Medication 1 TABLET(S): at 11:47

## 2018-06-08 RX ADMIN — LACTULOSE 10 GRAM(S): 10 SOLUTION ORAL at 06:20

## 2018-06-08 RX ADMIN — Medication 81 MILLIGRAM(S): at 11:47

## 2018-06-08 RX ADMIN — Medication 40 MILLIEQUIVALENT(S): at 11:48

## 2018-06-08 RX ADMIN — Medication 325 MILLIGRAM(S): at 11:48

## 2018-06-08 RX ADMIN — ENOXAPARIN SODIUM 40 MILLIGRAM(S): 100 INJECTION SUBCUTANEOUS at 11:48

## 2018-06-08 RX ADMIN — SODIUM CHLORIDE 100 MILLILITER(S): 9 INJECTION, SOLUTION INTRAVENOUS at 13:40

## 2018-06-08 RX ADMIN — Medication 0.5 MILLIGRAM(S): at 09:51

## 2018-06-08 RX ADMIN — Medication 1 MILLIGRAM(S): at 20:22

## 2018-06-08 RX ADMIN — Medication 1 MILLIGRAM(S): at 11:48

## 2018-06-08 RX ADMIN — PANTOPRAZOLE SODIUM 40 MILLIGRAM(S): 20 TABLET, DELAYED RELEASE ORAL at 11:48

## 2018-06-08 RX ADMIN — Medication 2 DROP(S): at 13:26

## 2018-06-08 RX ADMIN — NYSTATIN CREAM 1 APPLICATION(S): 100000 CREAM TOPICAL at 18:18

## 2018-06-08 RX ADMIN — Medication 1 TABLET(S): at 11:48

## 2018-06-08 RX ADMIN — POLYETHYLENE GLYCOL 3350 17 GRAM(S): 17 POWDER, FOR SOLUTION ORAL at 11:48

## 2018-06-08 RX ADMIN — Medication 2 DROP(S): at 06:01

## 2018-06-08 NOTE — PROGRESS NOTE ADULT - ASSESSMENT
52 y/o F w/ CP and severe MR, intractable seizures (frequency 1-2x/mo as per outpatient Neurologist Dr Arana) presenting w/ multiple seizures. Patient had 10 focal seizure today and 18 focal seizure yesterday. All seizures lasted about 45 seconds to 1 minute and resolved spontaneously. Her level of consciousness changed to lethargic from alert awake state. One seizure was witnessed by me. Head turning from towards right, ictal cry, eye deviation to right and generalized stiffening of extremities.     Impression     Focal seizure with secondary generalization       Plan    Load with Fosphenytoin 1500 mg   continue Fosphenytoin 500 BID   Give ativan 1 mg IV right now   monitor phenytoin level   seizure precaution  MICU consult ( > 28 focal seizure with generalization in last two days)   VEEG   cont Depakote and Keppra   Monitor depakote level

## 2018-06-08 NOTE — CHART NOTE - NSCHARTNOTEFT_GEN_A_CORE
Spoke with Neurology resident today.    Plan to expedite VEEG today.  Stated will reevaluate after study completed.

## 2018-06-08 NOTE — PROGRESS NOTE ADULT - SUBJECTIVE AND OBJECTIVE BOX
Neurology follow up note     Interval history : Patient had 10 focal seizure today and 18 focal seizure yesterday. All seizures lasted about 45 seconds to 1 minute and resolved spontaneously. Her level of consciousness changed to lethargic from alert awake state. One seizure was witnessed by me. Head turning from towards right, ictal cry, eye deviation to right and generalized stiffening of extremities.     HPI:  54 y/o F w/ CP and severe MR, intractable seizures (frequency 1-2x/mo as per outpatient Neurologist Dr Araan) presenting w/ seizure cluster today. . Patient recently admitted for cluster of seizure, and discharged on LEV 1gm BID and VPA 500mg BID. As per nurse recently saw her PCP who reduced the keppra dosage from 1000 mg BID back to 750mg BID, after that patient had 1 grand mal seizure last week with fecal incontinence, and a cluster today of clonic episodes lasting a few minutes with confusion. Aid denies any recent infections, travel, or changes in other medications.     PAST MEDICAL & SURGICAL HISTORY:  Constipation  Seizure disorder  Cerebral palsy  No significant past surgical history      Allergies    Topamax (Unknown)    Intolerances        MEDICATIONS  (STANDING):    MEDICATIONS  (PRN):      Social History: Denies tob, EtOH use     FAMILY HISTORY:  No pertinent family history in first degree relatives      Review of Systems:  as above    Physical Exam:   Vital Signs Last 24 Hrs  T(C): 36.4 (06 Jun 2018 13:34), Max: 36.4 (06 Jun 2018 13:34)  T(F): 97.6 (06 Jun 2018 13:34), Max: 97.6 (06 Jun 2018 13:34)  HR: 76 (06 Jun 2018 13:34) (76 - 76)  BP: 115/68 (06 Jun 2018 13:34) (115/68 - 115/68)  BP(mean): --  RR: 17 (06 Jun 2018 13:34) (17 - 17)  SpO2: --    General Exam:     Limited as patient had seizure during examination    Head turning from towards right, ictal cry, eye deviation to right and generalized stiffening of extremities.           Labs:     CBC Full  -  ( 06 Jun 2018 16:59 )  WBC Count : 6.78 K/uL  Hemoglobin : 13.1 g/dL  Hematocrit : 40.8 %  Platelet Count - Automated : 181 K/uL  Mean Cell Volume : 100.0 fL  Mean Cell Hemoglobin : 32.1 pg  Mean Cell Hemoglobin Concentration : 32.1 %  Auto Neutrophil # : 3.81 K/uL  Auto Lymphocyte # : 2.05 K/uL  Auto Monocyte # : 0.71 K/uL  Auto Eosinophil # : 0.12 K/uL  Auto Basophil # : 0.03 K/uL  Auto Neutrophil % : 56.2 %  Auto Lymphocyte % : 30.2 %  Auto Monocyte % : 10.5 %  Auto Eosinophil % : 1.8 %  Auto Basophil % : 0.4 %

## 2018-06-08 NOTE — CONSULT NOTE ADULT - ATTENDING COMMENTS
Patient with hx as above including CP with baseline cognitive dysfunction (non-verbal at baseline) and seizures p/w recurrent seizures. MICU consulted for refractory seizures.    Patient seen and examined. She is unable to provide any hx due to baseline status. As per the reports she has had a total of ~30 seizures since yesterday, each lasting < 1 minute. Patient has been on Keppra/depakote. Neuro evaluated the patient earlier this evening and recommended dilantin load, which she is receiving currently. CTH negative x2. On exam she is lethargic but arousable to physical stimuli. Hemodynamics are stable as noted above in the resident note above. Rest of the exam as per above. She is currently protecting her airway. She appears hypovolemic.     Labs reviewed, unremarkable.    At baseline she is able to open her eyes at the group home. As per the reports patient does improve back close to baseline in between each seizure episodes.    Impression  1.) Refractory seizures in this patient with hx of seizures in the setting of CP, no clinical evidence of status epilepticus  2.) Hypovolemia likely secondary to poor oral intake  3.) Seizure d/o    Plan:  1.) Load with dilantin now as per neuro recs and continue dilantin thereafter for maintenance as per neuro recs, monitor levels.  2.) Cont keppra, depakote. Monitor levels.  3.) Frequent neuro checks.  4.) cEEG as per neuro, should start as soon as possible (as per neuro likely in the AM).   5.) If patient continues to have recurrent seizures despite loading with dilantin she may require intubation for initiation of versed/propofol gtt.  6.) Cont IVF for hydration.  7.) Replete lytes as needed.  8.) Rest of the plan per the resident note above.  9.) Transfer to the MICU for closer neuro monitoring and possible intubation.  10.) Full code.    40 minutes of critical care time exclusive of all procedures.
patient seen and examined agree with above

## 2018-06-08 NOTE — CHART NOTE - NSCHARTNOTEFT_GEN_A_CORE
ADS NIGHT COVERAGE:    Notified by RN that pt had 9 beats uninterrupted Vtach, on tele. Pt seen at bedside. Pt nonverbal with aid at bedside. Pt has had multiple episodes of seizures lasting <30 secs througout night. 24 hour EEG monitoring supposed to be starting today.     Card: RRR  Lungs: CTA B/L    EKG done, unchanged from prior.     Will continue to monitor.  EDDIE REYES PA-C  [id number]

## 2018-06-08 NOTE — PROGRESS NOTE ADULT - ASSESSMENT
54 y/o female with PmHx of cerebral palsy and seizures had 4 seizure-like episodes in less than 24 hours.     Problem/Plan - 1:  ·  Problem: Recurrent Seizures.  Plan: neuro helping . Loaded with Keppra 1500 mg once. Increase Keppra 1000 mg BID. Continue Depakote 500mg BID. EEG pending. CT scan Head x 2 negative.      Problem/Plan - 2:  ·  Problem: Hypothermia.  Plan: [place], F/U Blood Cx negative so far  Urine Cx negative so far. Will hold off Abx for now as d/w Attending.      Problem/Plan - 3:  ·  Problem: Abnormal EKG.  Plan: Monitor on Telemetry, F/U Serial CE's, TTE pending.  Cardiology helping.      Problem/Plan - 4:  ·  Problem: Cerebral palsy, unspecified type.  Plan: Wheel chair bound. Fall precautions. Continue Cogentin. Pt incontinent and requires briefs.      Problem/Plan - 5:  ·  Problem: Intermittent asthma without complication, unspecified asthma severity.  Plan: Stable. Continue ventolin PRN, budesonide.      Problem/Plan - 6:  Problem: Mood disturbance. Plan: Continue Olanzapine.     Problem/Plan - 7:  ·  Problem: Constipation, unspecified constipation type.  Plan: Continue colace, Senna Miralax and Lactulose, MOM & fleet enema prn.      Problem/Plan - 8:  ·  Problem: Prophylactic measure.  Plan: DVT prophylaxis with enoxaparin. Continue ASA, furosemide, Nexium, Flonase, calcium + vitamin D, ferrous sulfate, folic acid, multivitamin and vitamin B1.

## 2018-06-08 NOTE — CONSULT NOTE ADULT - ASSESSMENT
53F with hx seizure d/o with acute worsening of seizures, seizure cluster of 28 seizures today. Loaded with fosphenytoin and MICU consulted, would benefit from MICU for closer monitoring, VEEG, given already on maximal therapy and if continues to seize may require intubation with propofol and/or versed.     Neuro:  -persistent seizure activity  -Check CPK, lactate with AM labs    Cardiovascular:    Respiratory:    GI:    Renal:    Endocrine:    ID:    Heme:    DVT PPX:    Jannie Watson, PGY3  Emergency Medicine  [phone] 53F with hx seizure d/o with acute worsening of seizures, seizure cluster of 28 seizures today. Loaded with fosphenytoin and MICU consulted, would benefit from MICU for closer monitoring, VEEG, given already on maximal therapy and if continues to seize may require intubation with propofol and/or versed.     Neuro:  -persistent seizure activity  -neuro following  -received loading dose fosphenytoin  -continue fosphenytoin 500mg BID, keppra 1000mg BID, depakote 500mg BID  -Ativan PRN  -If continues to seize, may require intubation, propofol/versed  -Check CPK, lactate with AM labs  -VEEG pending    Cardiovascular:  -Cardiology following for abnormal EKG on admission  -Trending cardiac enzymes  -Pending TTE    Respiratory:  -Continue duonebs PRN    GI:  -NPO until seizures controlled, currently on dysphagia 3 diet  -cont home pantoprazole    Renal:  -No active issues    Endocrine:  -No active issues    ID:  -No active issues, monitor off abx    Heme:  -No active issues     DVT PPX:  -On Lovenox SQ    Jannie Watson, PGY3  Emergency Medicine  [id number]

## 2018-06-08 NOTE — PROGRESS NOTE ADULT - SUBJECTIVE AND OBJECTIVE BOX
INTERVAL HPI/OVERNIGHT EVENTS: Seen and examined earlier today . Very sleepy.   Vital Signs Last 24 Hrs  T(C): 36.3 (2018 13:34), Max: 36.8 (2018 05:50)  T(F): 97.3 (2018 13:34), Max: 98.2 (2018 05:50)  HR: 71 (2018 13:34) (71 - 100)  BP: 98/80 (2018 13:34) (96/60 - 106/83)  BP(mean): --  RR: 18 (2018 13:34) (18 - 18)  SpO2: 96% (2018 13:34) (95% - 97%)  I&O's Summary    MEDICATIONS  (STANDING):  AQUAPHOR (petrolatum Ointment) 1 Application(s) Topical two times a day  artificial  tears Solution 2 Drop(s) Both EYES three times a day  aspirin enteric coated 81 milliGRAM(s) Oral daily  benztropine 0.5 milliGRAM(s) Oral two times a day  buDESOnide   0.5 milliGRAM(s) Respule 0.5 milliGRAM(s) Inhalation daily  calcium carbonate 1250 mG + Vitamin D (OsCal 500 + D) 1 Tablet(s) Oral two times a day  clotrimazole 1% Cream 1 Application(s) Topical two times a day  dextrose 5% + sodium chloride 0.9%. 1000 milliLiter(s) (100 mL/Hr) IV Continuous <Continuous>  diVALproex  milliGRAM(s) Oral two times a day  docusate sodium 300 milliGRAM(s) Oral at bedtime  enoxaparin Injectable 40 milliGRAM(s) SubCutaneous daily  ferrous    sulfate 325 milliGRAM(s) Oral daily  fluticasone propionate 50 MICROgram(s)/spray Nasal Spray 1 Spray(s) Both Nostrils two times a day  folic acid 1 milliGRAM(s) Oral daily  furosemide    Tablet 40 milliGRAM(s) Oral daily  lactulose Syrup 10 Gram(s) Oral two times a day  levETIRAcetam 1000 milliGRAM(s) Oral two times a day  multivitamin 1 Tablet(s) Oral daily  nystatin Powder 1 Application(s) Topical two times a day  OLANZapine 7.5 milliGRAM(s) Oral at bedtime  pantoprazole    Tablet 40 milliGRAM(s) Oral before breakfast  petrolatum Ophthalmic Ointment 1 Application(s) Both EYES two times a day  polyethylene glycol 3350 17 Gram(s) Oral daily  senna 2 Tablet(s) Oral at bedtime  sodium biphosphate Rectal Enema 1 Enema Rectal daily  thiamine 100 milliGRAM(s) Oral daily    MEDICATIONS  (PRN):  ALBUTerol/ipratropium for Nebulization 3 milliLiter(s) Nebulizer every 6 hours PRN Shortness of Breath and/or Wheezing  magnesium hydroxide Suspension 30 milliLiter(s) Oral at bedtime PRN for constipation    LABS:                        11.8   4.93  )-----------( 143      ( 2018 06:09 )             36.8     06-08    144  |  101  |  17  ----------------------------<  75  3.3<L>   |  33<H>  |  0.77    Ca    8.7      2018 06:09  Phos  3.8     06-08  Mg     2.1     06-08    TPro  6.9  /  Alb  3.2<L>  /  TBili  0.2  /  DBili  x   /  AST  27  /  ALT  21  /  AlkPhos  73  06-07      Urinalysis Basic - ( 2018 17:28 )    Color: YELLOW / Appearance: CLEAR / S.029 / pH: 6.0  Gluc: NEGATIVE / Ketone: TRACE  / Bili: NEGATIVE / Urobili: NORMAL mg/dL   Blood: NEGATIVE / Protein: 30 mg/dL / Nitrite: NEGATIVE   Leuk Esterase: TRACE / RBC: 0-2 / WBC 2-5   Sq Epi: x / Non Sq Epi: x / Bacteria: x      CAPILLARY BLOOD GLUCOSE            Urinalysis Basic - ( 2018 17:28 )    Color: YELLOW / Appearance: CLEAR / S.029 / pH: 6.0  Gluc: NEGATIVE / Ketone: TRACE  / Bili: NEGATIVE / Urobili: NORMAL mg/dL   Blood: NEGATIVE / Protein: 30 mg/dL / Nitrite: NEGATIVE   Leuk Esterase: TRACE / RBC: 0-2 / WBC 2-5   Sq Epi: x / Non Sq Epi: x / Bacteria: x      REVIEW OF SYSTEMS:  CONSTITUTIONAL: No fever, weight loss, or fatigue  EYES: No eye pain, visual disturbances, or discharge  ENMT:  No difficulty hearing, tinnitus, vertigo; No sinus or throat pain  NECK: No pain or stiffness  BREASTS: No pain, masses, or nipple discharge  RESPIRATORY: No cough, wheezing, chills or hemoptysis; No shortness of breath  CARDIOVASCULAR: No chest pain, palpitations, dizziness, or leg swelling  GASTROINTESTINAL: No abdominal or epigastric pain. No nausea, vomiting, or hematemesis; No diarrhea or constipation. No melena or hematochezia.  GENITOURINARY: No dysuria, frequency, hematuria, or incontinence  NEUROLOGICAL: No headaches, memory loss, loss of strength, numbness, or tremors  SKIN: No itching, burning, rashes, or lesions   LYMPH NODES: No enlarged glands  ENDOCRINE: No heat or cold intolerance; No hair loss  MUSCULOSKELETAL: No joint pain or swelling; No muscle, back, or extremity pain    Consultant(s) Notes Reviewed:  [x ] YES  [ ] NO    PHYSICAL EXAM:  GENERAL: NAD, Obese , not in any distress ,  HEAD:  Atraumatic, Normocephalic  EYES: EOMI, PERRLA, conjunctiva and sclera clear  ENMT: No tonsillar erythema, exudates, or enlargement; Moist mucous membranes, Good dentition, No lesions  NECK: Supple, No JVD, Normal thyroid  NERVOUS SYSTEM:  Alert & sleepy.   CHEST/LUNG: Good air entry bilateral with no  rales, rhonchi, wheezing, or rubs  HEART: Regular rate and rhythm; No murmurs, rubs, or gallops  ABDOMEN: Soft, Nontender, Nondistended; Bowel sounds present  EXTREMITIES:  2+ Peripheral Pulses, No clubbing, cyanosis, or edema  SKIN: No rashes or lesions    Care Discussed with Consultants/Other Providers [ x] YES  [ ] NO

## 2018-06-08 NOTE — PROGRESS NOTE ADULT - SUBJECTIVE AND OBJECTIVE BOX
Subjective:   	 pt appears comfortable no signs of acute distress  non verbal              noted seizure activity overnight & hypothermia      MEDICATIONS  (STANDING):  AQUAPHOR (petrolatum Ointment) 1 Application(s) Topical two times a day  artificial  tears Solution 2 Drop(s) Both EYES three times a day  aspirin enteric coated 81 milliGRAM(s) Oral daily  benztropine 0.5 milliGRAM(s) Oral two times a day  buDESOnide   0.5 milliGRAM(s) Respule 0.5 milliGRAM(s) Inhalation daily  calcium carbonate 1250 mG + Vitamin D (OsCal 500 + D) 1 Tablet(s) Oral two times a day  clotrimazole 1% Cream 1 Application(s) Topical two times a day  dextrose 5% + sodium chloride 0.9%. 1000 milliLiter(s) (100 mL/Hr) IV Continuous <Continuous>  diVALproex  milliGRAM(s) Oral two times a day  docusate sodium 300 milliGRAM(s) Oral at bedtime  enoxaparin Injectable 40 milliGRAM(s) SubCutaneous daily  ferrous    sulfate 325 milliGRAM(s) Oral daily  fluticasone propionate 50 MICROgram(s)/spray Nasal Spray 1 Spray(s) Both Nostrils two times a day  folic acid 1 milliGRAM(s) Oral daily  furosemide    Tablet 40 milliGRAM(s) Oral daily  lactulose Syrup 10 Gram(s) Oral two times a day  levETIRAcetam 1000 milliGRAM(s) Oral two times a day  multivitamin 1 Tablet(s) Oral daily  nystatin Powder 1 Application(s) Topical two times a day  OLANZapine 7.5 milliGRAM(s) Oral at bedtime  pantoprazole    Tablet 40 milliGRAM(s) Oral before breakfast  petrolatum Ophthalmic Ointment 1 Application(s) Both EYES two times a day  polyethylene glycol 3350 17 Gram(s) Oral daily  senna 2 Tablet(s) Oral at bedtime  sodium biphosphate Rectal Enema 1 Enema Rectal daily  thiamine 100 milliGRAM(s) Oral daily    MEDICATIONS  (PRN):  ALBUTerol/ipratropium for Nebulization 3 milliLiter(s) Nebulizer every 6 hours PRN Shortness of Breath and/or Wheezing  magnesium hydroxide Suspension 30 milliLiter(s) Oral at bedtime PRN for constipation      LABS:                        11.8   4.93  )-----------( 143      ( 08 Jun 2018 06:09 )             36.8     Hemoglobin: 11.8 g/dL (06-08 @ 06:09)  Hemoglobin: 11.7 g/dL (06-07 @ 10:59)  Hemoglobin: 13.1 g/dL (06-06 @ 16:59)    06-08    144  |  101  |  17  ----------------------------<  75  3.3<L>   |  33<H>  |  0.77    Ca    8.7      08 Jun 2018 06:09  Phos  3.8     06-08  Mg     2.1     06-08    TPro  6.9  /  Alb  3.2<L>  /  TBili  0.2  /  DBili  x   /  AST  27  /  ALT  21  /  AlkPhos  73  06-07    Creatinine Trend: 0.77<--, 0.70<--, 0.79<--     CARDIAC MARKERS ( 07 Jun 2018 21:27 )  x     / < 0.06 ng/mL / 33 u/L / 1.41 ng/mL / x      CARDIAC MARKERS ( 07 Jun 2018 10:59 )  x     / < 0.06 ng/mL / 32 u/L / x     / x            PHYSICAL EXAM  Vital Signs Last 24 Hrs  T(C): 36.3 (08 Jun 2018 13:34), Max: 36.8 (08 Jun 2018 05:50)  T(F): 97.3 (08 Jun 2018 13:34), Max: 98.2 (08 Jun 2018 05:50)  HR: 71 (08 Jun 2018 13:34) (71 - 100)  BP: 98/80 (08 Jun 2018 13:34) (96/60 - 106/83)  BP(mean): --  RR: 18 (08 Jun 2018 13:34) (18 - 18)  SpO2: 96% (08 Jun 2018 13:34) (95% - 97%)      Cardiovascular: Normal S1 S2, RRR  No JVD, 1/6 DIAZ murmur,  Respiratory: Lungs clear to auscultation, normal effort 	  Gastrointestinal:  Soft, Non-tender, + BS	  Extremities no edema, cyanosis, clubbing B/L LE's   , Peripheral pulses palpable 2+ bilaterally    TELEMETRY: SR  9b NSVT 	    ECG:  NSR @ 69 TWI v1-6 ?new changes in v4-6 	  RADIOLOGY:   < from: Xray Chest 1 View- PORTABLE-Urgent (06.06.18 @ 18:48) >  IMPRESSION:  Chin overlie and obscures portion of lung apices.    Uniform hazy opacity in peripheral lower left hemithorax obscuring   lateral left hemidiaphragm CP angle and left heart border related to   prominent epicardial fat as demonstrated on chestCT from 5/9/2018.     Sharp right CP angle. Clear remaining visualized lungs. No pneumothorax.    Heart size and mediastinal width inaccurately assessed on this projection.    Spinal degenerative changes again noted.    < end of copied text >    < from: CT Head No Cont (06.07.18 @ 02:40) >  Impression:     No acute intracranial hemorrhage, large cortical infarct or mass effect.   No significant interval change since 5/8/2018. If clinically indicated,   short-term follow-up or MRI may be obtained for further evaluation.    < end of copied text >      DIAGNOSTIC TESTING:  [ ] Echocardiogram:  [ ]  Catheterization:  [ ] Stress Test:    OTHER: 	      ASSESSMENT/PLAN: 	53y Female  with PmHx of cerebral palsy, intellectual disability, seizures and is non-verbal presented to [facility] ED after 4 seizures in less than 24 hours.  called for abnormal EKG   would repeat 12 lead EKG   CE neg x 2  check echo   hypokalemic    supplement K as per primary team   BLD cx neg x2 @ 24 hrs   UCX no growth to date  orthostatics supine--> sit neg   monitor on tele   F/u neuro recommendations     EEG pending   hypothermia   on warming blanket Subjective:   	 pt appears comfortable no signs of acute distress  non verbal              noted seizure activity overnight & hypothermia      MEDICATIONS  (STANDING):  AQUAPHOR (petrolatum Ointment) 1 Application(s) Topical two times a day  artificial  tears Solution 2 Drop(s) Both EYES three times a day  aspirin enteric coated 81 milliGRAM(s) Oral daily  benztropine 0.5 milliGRAM(s) Oral two times a day  buDESOnide   0.5 milliGRAM(s) Respule 0.5 milliGRAM(s) Inhalation daily  calcium carbonate 1250 mG + Vitamin D (OsCal 500 + D) 1 Tablet(s) Oral two times a day  clotrimazole 1% Cream 1 Application(s) Topical two times a day  dextrose 5% + sodium chloride 0.9%. 1000 milliLiter(s) (100 mL/Hr) IV Continuous <Continuous>  diVALproex  milliGRAM(s) Oral two times a day  docusate sodium 300 milliGRAM(s) Oral at bedtime  enoxaparin Injectable 40 milliGRAM(s) SubCutaneous daily  ferrous    sulfate 325 milliGRAM(s) Oral daily  fluticasone propionate 50 MICROgram(s)/spray Nasal Spray 1 Spray(s) Both Nostrils two times a day  folic acid 1 milliGRAM(s) Oral daily  furosemide    Tablet 40 milliGRAM(s) Oral daily  lactulose Syrup 10 Gram(s) Oral two times a day  levETIRAcetam 1000 milliGRAM(s) Oral two times a day  multivitamin 1 Tablet(s) Oral daily  nystatin Powder 1 Application(s) Topical two times a day  OLANZapine 7.5 milliGRAM(s) Oral at bedtime  pantoprazole    Tablet 40 milliGRAM(s) Oral before breakfast  petrolatum Ophthalmic Ointment 1 Application(s) Both EYES two times a day  polyethylene glycol 3350 17 Gram(s) Oral daily  senna 2 Tablet(s) Oral at bedtime  sodium biphosphate Rectal Enema 1 Enema Rectal daily  thiamine 100 milliGRAM(s) Oral daily    MEDICATIONS  (PRN):  ALBUTerol/ipratropium for Nebulization 3 milliLiter(s) Nebulizer every 6 hours PRN Shortness of Breath and/or Wheezing  magnesium hydroxide Suspension 30 milliLiter(s) Oral at bedtime PRN for constipation      LABS:                        11.8   4.93  )-----------( 143      ( 08 Jun 2018 06:09 )             36.8     Hemoglobin: 11.8 g/dL (06-08 @ 06:09)  Hemoglobin: 11.7 g/dL (06-07 @ 10:59)  Hemoglobin: 13.1 g/dL (06-06 @ 16:59)    06-08    144  |  101  |  17  ----------------------------<  75  3.3<L>   |  33<H>  |  0.77    Ca    8.7      08 Jun 2018 06:09  Phos  3.8     06-08  Mg     2.1     06-08    TPro  6.9  /  Alb  3.2<L>  /  TBili  0.2  /  DBili  x   /  AST  27  /  ALT  21  /  AlkPhos  73  06-07    Creatinine Trend: 0.77<--, 0.70<--, 0.79<--     CARDIAC MARKERS ( 07 Jun 2018 21:27 )  x     / < 0.06 ng/mL / 33 u/L / 1.41 ng/mL / x      CARDIAC MARKERS ( 07 Jun 2018 10:59 )  x     / < 0.06 ng/mL / 32 u/L / x     / x            PHYSICAL EXAM  Vital Signs Last 24 Hrs  T(C): 36.3 (08 Jun 2018 13:34), Max: 36.8 (08 Jun 2018 05:50)  T(F): 97.3 (08 Jun 2018 13:34), Max: 98.2 (08 Jun 2018 05:50)  HR: 71 (08 Jun 2018 13:34) (71 - 100)  BP: 98/80 (08 Jun 2018 13:34) (96/60 - 106/83)  BP(mean): --  RR: 18 (08 Jun 2018 13:34) (18 - 18)  SpO2: 96% (08 Jun 2018 13:34) (95% - 97%)      Cardiovascular: Normal S1 S2, RRR  No JVD, 1/6 DIAZ murmur,  Respiratory: Lungs clear to auscultation, normal effort 	  Gastrointestinal:  Soft, Non-tender, + BS	  Extremities no edema, cyanosis, clubbing B/L LE's   , Peripheral pulses palpable 2+ bilaterally    TELEMETRY: SR  9b NSVT 	    ECG:  NSR @ 69 TWI v1-6 ?new changes in v4-6 	  RADIOLOGY:   < from: Xray Chest 1 View- PORTABLE-Urgent (06.06.18 @ 18:48) >  IMPRESSION:  Chin overlie and obscures portion of lung apices.    Uniform hazy opacity in peripheral lower left hemithorax obscuring   lateral left hemidiaphragm CP angle and left heart border related to   prominent epicardial fat as demonstrated on chestCT from 5/9/2018.     Sharp right CP angle. Clear remaining visualized lungs. No pneumothorax.    Heart size and mediastinal width inaccurately assessed on this projection.    Spinal degenerative changes again noted.    < end of copied text >    < from: CT Head No Cont (06.07.18 @ 02:40) >  Impression:     No acute intracranial hemorrhage, large cortical infarct or mass effect.   No significant interval change since 5/8/2018. If clinically indicated,   short-term follow-up or MRI may be obtained for further evaluation.    < end of copied text >      DIAGNOSTIC TESTING:  [ ] Echocardiogram:  [ ]  Catheterization:  [ ] Stress Test:    OTHER: 	      ASSESSMENT/PLAN: 	53y Female  with PmHx of cerebral palsy, intellectual disability, seizures and is non-verbal presented to [facility] ED after 4 seizures in less than 24 hours.  called for abnormal EKG   would repeat 12 lead EKG   CE neg x 2  check echo   hypokalemic    supplement K as per primary team   BLD cx neg x2 @ 24 hrs   UCX no growth to date  orthostatics supine--> sit neg   monitor on tele   F/u neuro recommendations     EEG pending   hypothermia   on warming blanket   noted NSVT    monitor electrolytes     supplement as required     Keep K>/=4 and Mag >/= 2

## 2018-06-09 LAB
ALBUMIN SERPL ELPH-MCNC: 2.8 G/DL — LOW (ref 3.3–5)
ALBUMIN SERPL ELPH-MCNC: 3.1 G/DL — LOW (ref 3.3–5)
ALP SERPL-CCNC: 57 U/L — SIGNIFICANT CHANGE UP (ref 40–120)
ALP SERPL-CCNC: 66 U/L — SIGNIFICANT CHANGE UP (ref 40–120)
ALT FLD-CCNC: 16 U/L — SIGNIFICANT CHANGE UP (ref 4–33)
ALT FLD-CCNC: 21 U/L — SIGNIFICANT CHANGE UP (ref 4–33)
AMMONIA BLD-MCNC: 21 UMOL/L — SIGNIFICANT CHANGE UP (ref 11–55)
AMMONIA BLD-MCNC: 51 UMOL/L — SIGNIFICANT CHANGE UP (ref 11–55)
APPEARANCE UR: CLEAR — SIGNIFICANT CHANGE UP
AST SERPL-CCNC: 18 U/L — SIGNIFICANT CHANGE UP (ref 4–32)
AST SERPL-CCNC: 24 U/L — SIGNIFICANT CHANGE UP (ref 4–32)
BASE EXCESS BLDV CALC-SCNC: 4.7 MMOL/L — SIGNIFICANT CHANGE UP
BASOPHILS # BLD AUTO: 0.02 K/UL — SIGNIFICANT CHANGE UP (ref 0–0.2)
BASOPHILS # BLD AUTO: 0.03 K/UL — SIGNIFICANT CHANGE UP (ref 0–0.2)
BASOPHILS NFR BLD AUTO: 0.3 % — SIGNIFICANT CHANGE UP (ref 0–2)
BASOPHILS NFR BLD AUTO: 0.4 % — SIGNIFICANT CHANGE UP (ref 0–2)
BILIRUB SERPL-MCNC: < 0.2 MG/DL — LOW (ref 0.2–1.2)
BILIRUB SERPL-MCNC: < 0.2 MG/DL — LOW (ref 0.2–1.2)
BILIRUB UR-MCNC: NEGATIVE — SIGNIFICANT CHANGE UP
BLOOD UR QL VISUAL: NEGATIVE — SIGNIFICANT CHANGE UP
BUN SERPL-MCNC: 12 MG/DL — SIGNIFICANT CHANGE UP (ref 7–23)
BUN SERPL-MCNC: 7 MG/DL — SIGNIFICANT CHANGE UP (ref 7–23)
CALCIUM SERPL-MCNC: 7.8 MG/DL — LOW (ref 8.4–10.5)
CALCIUM SERPL-MCNC: 8.2 MG/DL — LOW (ref 8.4–10.5)
CHLORIDE SERPL-SCNC: 106 MMOL/L — SIGNIFICANT CHANGE UP (ref 98–107)
CHLORIDE SERPL-SCNC: 107 MMOL/L — SIGNIFICANT CHANGE UP (ref 98–107)
CO2 SERPL-SCNC: 29 MMOL/L — SIGNIFICANT CHANGE UP (ref 22–31)
CO2 SERPL-SCNC: 30 MMOL/L — SIGNIFICANT CHANGE UP (ref 22–31)
COLOR SPEC: SIGNIFICANT CHANGE UP
CORTIS SERPL-MCNC: 12.2 UG/DL — SIGNIFICANT CHANGE UP (ref 2.7–18.4)
CREAT SERPL-MCNC: 0.57 MG/DL — SIGNIFICANT CHANGE UP (ref 0.5–1.3)
CREAT SERPL-MCNC: 0.71 MG/DL — SIGNIFICANT CHANGE UP (ref 0.5–1.3)
EOSINOPHIL # BLD AUTO: 0.14 K/UL — SIGNIFICANT CHANGE UP (ref 0–0.5)
EOSINOPHIL # BLD AUTO: 0.16 K/UL — SIGNIFICANT CHANGE UP (ref 0–0.5)
EOSINOPHIL NFR BLD AUTO: 2 % — SIGNIFICANT CHANGE UP (ref 0–6)
EOSINOPHIL NFR BLD AUTO: 2.7 % — SIGNIFICANT CHANGE UP (ref 0–6)
GAS PNL BLDV: 141 MMOL/L — SIGNIFICANT CHANGE UP (ref 136–146)
GLUCOSE BLDV-MCNC: 113 — HIGH (ref 70–99)
GLUCOSE SERPL-MCNC: 111 MG/DL — HIGH (ref 70–99)
GLUCOSE SERPL-MCNC: 113 MG/DL — HIGH (ref 70–99)
GLUCOSE UR-MCNC: SIGNIFICANT CHANGE UP
HCO3 BLDV-SCNC: 27 MMOL/L — SIGNIFICANT CHANGE UP (ref 20–27)
HCT VFR BLD CALC: 34.5 % — SIGNIFICANT CHANGE UP (ref 34.5–45)
HCT VFR BLD CALC: 38.5 % — SIGNIFICANT CHANGE UP (ref 34.5–45)
HCT VFR BLDV CALC: 36.5 % — SIGNIFICANT CHANGE UP (ref 34.5–45)
HGB BLD-MCNC: 10.7 G/DL — LOW (ref 11.5–15.5)
HGB BLD-MCNC: 12.3 G/DL — SIGNIFICANT CHANGE UP (ref 11.5–15.5)
HGB BLDV-MCNC: 11.9 G/DL — SIGNIFICANT CHANGE UP (ref 11.5–15.5)
IMM GRANULOCYTES # BLD AUTO: 0.09 # — SIGNIFICANT CHANGE UP
IMM GRANULOCYTES # BLD AUTO: 0.11 # — SIGNIFICANT CHANGE UP
IMM GRANULOCYTES NFR BLD AUTO: 1.5 % — SIGNIFICANT CHANGE UP (ref 0–1.5)
IMM GRANULOCYTES NFR BLD AUTO: 1.6 % — HIGH (ref 0–1.5)
KETONES UR-MCNC: SIGNIFICANT CHANGE UP
LEUKOCYTE ESTERASE UR-ACNC: NEGATIVE — SIGNIFICANT CHANGE UP
LYMPHOCYTES # BLD AUTO: 1.86 K/UL — SIGNIFICANT CHANGE UP (ref 1–3.3)
LYMPHOCYTES # BLD AUTO: 2 K/UL — SIGNIFICANT CHANGE UP (ref 1–3.3)
LYMPHOCYTES # BLD AUTO: 29 % — SIGNIFICANT CHANGE UP (ref 13–44)
LYMPHOCYTES # BLD AUTO: 30.9 % — SIGNIFICANT CHANGE UP (ref 13–44)
MAGNESIUM SERPL-MCNC: 1.7 MG/DL — SIGNIFICANT CHANGE UP (ref 1.6–2.6)
MAGNESIUM SERPL-MCNC: 1.9 MG/DL — SIGNIFICANT CHANGE UP (ref 1.6–2.6)
MCHC RBC-ENTMCNC: 31 % — LOW (ref 32–36)
MCHC RBC-ENTMCNC: 31.9 % — LOW (ref 32–36)
MCHC RBC-ENTMCNC: 32.4 PG — SIGNIFICANT CHANGE UP (ref 27–34)
MCHC RBC-ENTMCNC: 32.7 PG — SIGNIFICANT CHANGE UP (ref 27–34)
MCV RBC AUTO: 101.3 FL — HIGH (ref 80–100)
MCV RBC AUTO: 105.5 FL — HIGH (ref 80–100)
MONOCYTES # BLD AUTO: 0.59 K/UL — SIGNIFICANT CHANGE UP (ref 0–0.9)
MONOCYTES # BLD AUTO: 0.84 K/UL — SIGNIFICANT CHANGE UP (ref 0–0.9)
MONOCYTES NFR BLD AUTO: 12.2 % — SIGNIFICANT CHANGE UP (ref 2–14)
MONOCYTES NFR BLD AUTO: 9.8 % — SIGNIFICANT CHANGE UP (ref 2–14)
NEUTROPHILS # BLD AUTO: 3.3 K/UL — SIGNIFICANT CHANGE UP (ref 1.8–7.4)
NEUTROPHILS # BLD AUTO: 3.77 K/UL — SIGNIFICANT CHANGE UP (ref 1.8–7.4)
NEUTROPHILS NFR BLD AUTO: 54.8 % — SIGNIFICANT CHANGE UP (ref 43–77)
NEUTROPHILS NFR BLD AUTO: 54.8 % — SIGNIFICANT CHANGE UP (ref 43–77)
NITRITE UR-MCNC: NEGATIVE — SIGNIFICANT CHANGE UP
NRBC # FLD: 0 — SIGNIFICANT CHANGE UP
NRBC # FLD: 0.02 — SIGNIFICANT CHANGE UP
PCO2 BLDV: 62 MMHG — HIGH (ref 41–51)
PH BLDV: 7.32 PH — SIGNIFICANT CHANGE UP (ref 7.32–7.43)
PH UR: 7.5 — SIGNIFICANT CHANGE UP (ref 4.6–8)
PHENYTOIN FREE SERPL-MCNC: 4.3 UG/ML — LOW (ref 10–20)
PHOSPHATE SERPL-MCNC: 2.9 MG/DL — SIGNIFICANT CHANGE UP (ref 2.5–4.5)
PHOSPHATE SERPL-MCNC: 3.9 MG/DL — SIGNIFICANT CHANGE UP (ref 2.5–4.5)
PLATELET # BLD AUTO: 117 K/UL — LOW (ref 150–400)
PLATELET # BLD AUTO: 125 K/UL — LOW (ref 150–400)
PMV BLD: 10.2 FL — SIGNIFICANT CHANGE UP (ref 7–13)
PMV BLD: 10.6 FL — SIGNIFICANT CHANGE UP (ref 7–13)
PO2 BLDV: 49 MMHG — HIGH (ref 35–40)
POTASSIUM BLDV-SCNC: 3.5 MMOL/L — SIGNIFICANT CHANGE UP (ref 3.4–4.5)
POTASSIUM SERPL-MCNC: 3.5 MMOL/L — SIGNIFICANT CHANGE UP (ref 3.5–5.3)
POTASSIUM SERPL-MCNC: 3.7 MMOL/L — SIGNIFICANT CHANGE UP (ref 3.5–5.3)
POTASSIUM SERPL-SCNC: 3.5 MMOL/L — SIGNIFICANT CHANGE UP (ref 3.5–5.3)
POTASSIUM SERPL-SCNC: 3.7 MMOL/L — SIGNIFICANT CHANGE UP (ref 3.5–5.3)
PROT SERPL-MCNC: 5.9 G/DL — LOW (ref 6–8.3)
PROT SERPL-MCNC: 6.7 G/DL — SIGNIFICANT CHANGE UP (ref 6–8.3)
PROT UR-MCNC: NEGATIVE MG/DL — SIGNIFICANT CHANGE UP
RBC # BLD: 3.27 M/UL — LOW (ref 3.8–5.2)
RBC # BLD: 3.8 M/UL — SIGNIFICANT CHANGE UP (ref 3.8–5.2)
RBC # FLD: 13.5 % — SIGNIFICANT CHANGE UP (ref 10.3–14.5)
RBC # FLD: 13.6 % — SIGNIFICANT CHANGE UP (ref 10.3–14.5)
RBC CASTS # UR COMP ASSIST: SIGNIFICANT CHANGE UP (ref 0–?)
SAO2 % BLDV: 78.7 % — SIGNIFICANT CHANGE UP (ref 60–85)
SODIUM SERPL-SCNC: 144 MMOL/L — SIGNIFICANT CHANGE UP (ref 135–145)
SODIUM SERPL-SCNC: 145 MMOL/L — SIGNIFICANT CHANGE UP (ref 135–145)
SP GR SPEC: 1.01 — SIGNIFICANT CHANGE UP (ref 1–1.04)
SQUAMOUS # UR AUTO: SIGNIFICANT CHANGE UP
TSH SERPL-MCNC: 4.27 UIU/ML — HIGH (ref 0.27–4.2)
UROBILINOGEN FLD QL: NORMAL MG/DL — SIGNIFICANT CHANGE UP
VALPROATE SERPL-MCNC: 34.9 UG/ML — LOW (ref 50–100)
WBC # BLD: 6.02 K/UL — SIGNIFICANT CHANGE UP (ref 3.8–10.5)
WBC # BLD: 6.89 K/UL — SIGNIFICANT CHANGE UP (ref 3.8–10.5)
WBC # FLD AUTO: 6.02 K/UL — SIGNIFICANT CHANGE UP (ref 3.8–10.5)
WBC # FLD AUTO: 6.89 K/UL — SIGNIFICANT CHANGE UP (ref 3.8–10.5)

## 2018-06-09 PROCEDURE — 95819 EEG AWAKE AND ASLEEP: CPT | Mod: 26

## 2018-06-09 PROCEDURE — 99291 CRITICAL CARE FIRST HOUR: CPT

## 2018-06-09 RX ORDER — PANTOPRAZOLE SODIUM 20 MG/1
40 TABLET, DELAYED RELEASE ORAL DAILY
Refills: 0 | Status: DISCONTINUED | OUTPATIENT
Start: 2018-06-09 | End: 2018-06-13

## 2018-06-09 RX ORDER — FOSPHENYTOIN 50 MG/ML
100 INJECTION INTRAMUSCULAR; INTRAVENOUS THREE TIMES A DAY
Refills: 0 | Status: DISCONTINUED | OUTPATIENT
Start: 2018-06-09 | End: 2018-06-12

## 2018-06-09 RX ORDER — LACOSAMIDE 50 MG/1
300 TABLET ORAL EVERY 12 HOURS
Refills: 0 | Status: DISCONTINUED | OUTPATIENT
Start: 2018-06-09 | End: 2018-06-16

## 2018-06-09 RX ORDER — FUROSEMIDE 40 MG
20 TABLET ORAL DAILY
Refills: 0 | Status: DISCONTINUED | OUTPATIENT
Start: 2018-06-09 | End: 2018-06-09

## 2018-06-09 RX ORDER — SODIUM CHLORIDE 9 MG/ML
1000 INJECTION INTRAMUSCULAR; INTRAVENOUS; SUBCUTANEOUS ONCE
Refills: 0 | Status: COMPLETED | OUTPATIENT
Start: 2018-06-09 | End: 2018-06-09

## 2018-06-09 RX ORDER — LEVETIRACETAM 250 MG/1
1500 TABLET, FILM COATED ORAL EVERY 12 HOURS
Refills: 0 | Status: DISCONTINUED | OUTPATIENT
Start: 2018-06-09 | End: 2018-06-12

## 2018-06-09 RX ORDER — VALPROIC ACID (AS SODIUM SALT) 250 MG/5ML
750 SOLUTION, ORAL ORAL
Refills: 0 | Status: DISCONTINUED | OUTPATIENT
Start: 2018-06-09 | End: 2018-06-10

## 2018-06-09 RX ADMIN — Medication 1 APPLICATION(S): at 17:15

## 2018-06-09 RX ADMIN — ENOXAPARIN SODIUM 40 MILLIGRAM(S): 100 INJECTION SUBCUTANEOUS at 12:30

## 2018-06-09 RX ADMIN — Medication 1 SPRAY(S): at 18:00

## 2018-06-09 RX ADMIN — Medication 1 SPRAY(S): at 07:03

## 2018-06-09 RX ADMIN — LEVETIRACETAM 400 MILLIGRAM(S): 250 TABLET, FILM COATED ORAL at 23:57

## 2018-06-09 RX ADMIN — Medication 1 ENEMA: at 12:30

## 2018-06-09 RX ADMIN — PANTOPRAZOLE SODIUM 40 MILLIGRAM(S): 20 TABLET, DELAYED RELEASE ORAL at 13:39

## 2018-06-09 RX ADMIN — SODIUM CHLORIDE 1000 MILLILITER(S): 9 INJECTION INTRAMUSCULAR; INTRAVENOUS; SUBCUTANEOUS at 02:28

## 2018-06-09 RX ADMIN — Medication 1 APPLICATION(S): at 06:51

## 2018-06-09 RX ADMIN — SODIUM CHLORIDE 100 MILLILITER(S): 9 INJECTION, SOLUTION INTRAVENOUS at 00:08

## 2018-06-09 RX ADMIN — Medication 1 APPLICATION(S): at 06:52

## 2018-06-09 RX ADMIN — FOSPHENYTOIN 104 MILLIGRAM(S) PE: 50 INJECTION INTRAMUSCULAR; INTRAVENOUS at 14:00

## 2018-06-09 RX ADMIN — SODIUM CHLORIDE 100 MILLILITER(S): 9 INJECTION, SOLUTION INTRAVENOUS at 23:58

## 2018-06-09 RX ADMIN — Medication 2 DROP(S): at 23:00

## 2018-06-09 RX ADMIN — Medication 28.75 MILLIGRAM(S): at 18:00

## 2018-06-09 RX ADMIN — NYSTATIN CREAM 1 APPLICATION(S): 100000 CREAM TOPICAL at 17:14

## 2018-06-09 RX ADMIN — FOSPHENYTOIN 104 MILLIGRAM(S) PE: 50 INJECTION INTRAMUSCULAR; INTRAVENOUS at 23:30

## 2018-06-09 RX ADMIN — LEVETIRACETAM 400 MILLIGRAM(S): 250 TABLET, FILM COATED ORAL at 00:08

## 2018-06-09 RX ADMIN — LACOSAMIDE 160 MILLIGRAM(S): 50 TABLET ORAL at 14:07

## 2018-06-09 RX ADMIN — Medication 2 DROP(S): at 07:02

## 2018-06-09 RX ADMIN — LEVETIRACETAM 400 MILLIGRAM(S): 250 TABLET, FILM COATED ORAL at 12:30

## 2018-06-09 RX ADMIN — Medication 20 MILLIGRAM(S): at 07:02

## 2018-06-09 RX ADMIN — Medication 1 APPLICATION(S): at 17:16

## 2018-06-09 RX ADMIN — Medication 2 DROP(S): at 14:00

## 2018-06-09 RX ADMIN — Medication 0.5 MILLIGRAM(S): at 09:47

## 2018-06-09 RX ADMIN — NYSTATIN CREAM 1 APPLICATION(S): 100000 CREAM TOPICAL at 06:53

## 2018-06-09 RX ADMIN — Medication 27.5 MILLIGRAM(S): at 07:02

## 2018-06-09 NOTE — PROGRESS NOTE ADULT - SUBJECTIVE AND OBJECTIVE BOX
Neurology Progress    NAYANA JACQUES53yFemale    HPI:  54 y/o female with PmHx of cerebral palsy, intellectual disability, seizures and is non-verbal presented to [facility] ED after 4 seizures in less than 24 hours. Seizures occurred yesterday at ~0200, ~0700, ~1200 and today at ~0500. With each seizure the pt was described as being more confused than baseline for about 5 minutes as per facility RN. This seizure activity was the same as previous seizures witnessed by RN and other staff. Pt normally has seizure activity 1-2x per month. There was also incontinence with each seizure activity, but pt is normally incontinent for urine and stool.     Pt had similar seizures last month and she was hospitalized from 5/8-11. Pt was discharged with an increased keppra dose to 1000 mg BID and was also started on Depakaote 500mg BID in place of her carbamzepine. Since that time she saw her PCP around May 30th per facility RN and the PCP lowered the pt's Keppra dose from 1000 mg to 750mg due to her Keppra levels being too high and also continued her carbamazepine with depakote reportedly.     Facility RN denies any recent travel, evidence of recent infections, syncope, weight changes, night sweats, PND, vomiting, constipation, trauma or changes in other medications. (07 Jun 2018 07:46)      Past Medical History  Intellectual disability  Constipation  Seizure disorder  Cerebral palsy      Past Surgical History  No significant past surgical history      MEDICATIONS    ALBUTerol/ipratropium for Nebulization 3 milliLiter(s) Nebulizer every 6 hours PRN  AQUAPHOR (petrolatum Ointment) 1 Application(s) Topical two times a day  artificial  tears Solution 2 Drop(s) Both EYES three times a day  aspirin enteric coated 81 milliGRAM(s) Oral daily  benztropine 0.5 milliGRAM(s) Oral two times a day  buDESOnide   0.5 milliGRAM(s) Respule 0.5 milliGRAM(s) Inhalation daily  clotrimazole 1% Cream 1 Application(s) Topical two times a day  dextrose 5% + sodium chloride 0.9%. 1000 milliLiter(s) IV Continuous <Continuous>  enoxaparin Injectable 40 milliGRAM(s) SubCutaneous daily  fluticasone propionate 50 MICROgram(s)/spray Nasal Spray 1 Spray(s) Both Nostrils two times a day  fosphenytoin IVPB 500 milliGRAM(s) PE IV Intermittent two times a day  furosemide   Injectable 20 milliGRAM(s) IV Push daily  lactulose Syrup 10 Gram(s) Oral two times a day  levETIRAcetam  IVPB 1000 milliGRAM(s) IV Intermittent every 12 hours  nystatin Powder 1 Application(s) Topical two times a day  OLANZapine 7.5 milliGRAM(s) Oral at bedtime  pantoprazole  Injectable 40 milliGRAM(s) IV Push daily  sodium biphosphate Rectal Enema 1 Enema Rectal daily  valproate sodium IVPB 500 milliGRAM(s) IV Intermittent two times a day         Family history: No history of dementia, strokes, or seizures   FAMILY HISTORY:  No pertinent family history in first degree relatives    SOCIAL HISTORY -- No history of tobacco or alcohol use     Allergies    Topamax (Unknown)    Intolerances            Vital Signs Last 24 Hrs  T(C): 36.9 (09 Jun 2018 08:00), Max: 36.9 (09 Jun 2018 08:00)  T(F): 98.5 (09 Jun 2018 08:00), Max: 98.5 (09 Jun 2018 08:00)  HR: 71 (09 Jun 2018 08:00) (67 - 84)  BP: 118/93 (09 Jun 2018 08:00) (83/47 - 148/79)  BP(mean): 97 (09 Jun 2018 08:00) (56 - 114)  RR: 15 (09 Jun 2018 08:00) (10 - 23)  SpO2: 100% (09 Jun 2018 08:00) (91% - 100%)        On Neurological Examination:    Mental Status - Patient is alert, awake, non verbaln now post ictal                             Cranial Nerves - Extraocular muscle intact  WINSTON Facial symmetry Tongue midline, CnV1to V3 intact gross hearing intact      Motor Exam - move extremeties      Sensory    withdraw    GENERAL Exam:     Nontoxic , No Acute Distress   	  HEENT:  normocephalic, atraumatic  		  LUNGS:	Clear bilaterally  No Wheeze      VASCULAR: no carotid brui  	  HEART:	 Normal S1S2   No murmur RRR        	  MUSCULOSKELETAL: Normal Range of Motion  	   SKIN:      Normal   No Ecchymosis               LABS:  CBC Full  -  ( 09 Jun 2018 02:20 )  WBC Count : 6.89 K/uL  Hemoglobin : 10.7 g/dL  Hematocrit : 34.5 %  Platelet Count - Automated : 125 K/uL  Mean Cell Volume : 105.5 fL  Mean Cell Hemoglobin : 32.7 pg  Mean Cell Hemoglobin Concentration : 31.0 %  Auto Neutrophil # : 3.77 K/uL  Auto Lymphocyte # : 2.00 K/uL  Auto Monocyte # : 0.84 K/uL  Auto Eosinophil # : 0.14 K/uL  Auto Basophil # : 0.03 K/uL  Auto Neutrophil % : 54.8 %  Auto Lymphocyte % : 29.0 %  Auto Monocyte % : 12.2 %  Auto Eosinophil % : 2.0 %  Auto Basophil % : 0.4 %      06-09    145  |  107  |  12  ----------------------------<  113<H>  3.5   |  30  |  0.71    Ca    8.2<L>      09 Jun 2018 02:20  Phos  3.9     06-09  Mg     1.9     06-09    TPro  5.9<L>  /  Alb  2.8<L>  /  TBili  < 0.2<L>  /  DBili  x   /  AST  18  /  ALT  16  /  AlkPhos  57  06-09    Hemoglobin A1C:     LIVER FUNCTIONS - ( 09 Jun 2018 02:20 )  Alb: 2.8 g/dL / Pro: 5.9 g/dL / ALK PHOS: 57 u/L / ALT: 16 u/L / AST: 18 u/L / GGT: x           Vitamin B12         RADIOLOGY    EKG      Id schoenberg

## 2018-06-09 NOTE — PROGRESS NOTE ADULT - SUBJECTIVE AND OBJECTIVE BOX
Subjective:   	 pt appears comfortable no signs of acute distress  non verbal              noted MICU admit overnight for persistent seizures / multiple episodes of desaturation      MEDICATIONS  (STANDING):  AQUAPHOR (petrolatum Ointment) 1 Application(s) Topical two times a day  artificial  tears Solution 2 Drop(s) Both EYES three times a day  aspirin enteric coated 81 milliGRAM(s) Oral daily  benztropine 0.5 milliGRAM(s) Oral two times a day  buDESOnide   0.5 milliGRAM(s) Respule 0.5 milliGRAM(s) Inhalation daily  clotrimazole 1% Cream 1 Application(s) Topical two times a day  dextrose 5% + sodium chloride 0.9%. 1000 milliLiter(s) (100 mL/Hr) IV Continuous <Continuous>  enoxaparin Injectable 40 milliGRAM(s) SubCutaneous daily  fluticasone propionate 50 MICROgram(s)/spray Nasal Spray 1 Spray(s) Both Nostrils two times a day  fosphenytoin IVPB 100 milliGRAM(s) PE IV Intermittent three times a day  lacosamide IVPB 300 milliGRAM(s) IV Intermittent every 12 hours  lactulose Syrup 10 Gram(s) Oral two times a day  levETIRAcetam  IVPB 1500 milliGRAM(s) IV Intermittent every 12 hours  nystatin Powder 1 Application(s) Topical two times a day  OLANZapine 7.5 milliGRAM(s) Oral at bedtime  pantoprazole  Injectable 40 milliGRAM(s) IV Push daily  sodium biphosphate Rectal Enema 1 Enema Rectal daily  valproate sodium IVPB 500 milliGRAM(s) IV Intermittent two times a day    MEDICATIONS  (PRN):  ALBUTerol/ipratropium for Nebulization 3 milliLiter(s) Nebulizer every 6 hours PRN Shortness of Breath and/or Wheezing      LABS:                        10.7   6.89  )-----------( 125      ( 09 Jun 2018 02:20 )             34.5     Hemoglobin: 10.7 g/dL (06-09 @ 02:20)  Hemoglobin: 11.8 g/dL (06-08 @ 06:09)  Hemoglobin: 11.7 g/dL (06-07 @ 10:59)  Hemoglobin: 13.1 g/dL (06-06 @ 16:59)    06-09    145  |  107  |  12  ----------------------------<  113<H>  3.5   |  30  |  0.71    Ca    8.2<L>      09 Jun 2018 02:20  Phos  3.9     06-09  Mg     1.9     06-09    TPro  5.9<L>  /  Alb  2.8<L>  /  TBili  < 0.2<L>  /  DBili  x   /  AST  18  /  ALT  16  /  AlkPhos  57  06-09    Creatinine Trend: 0.71<--, 0.77<--, 0.70<--, 0.79<--     CARDIAC MARKERS ( 07 Jun 2018 21:27 )  x     / < 0.06 ng/mL / 33 u/L / 1.41 ng/mL / x      CARDIAC MARKERS ( 07 Jun 2018 10:59 )  x     / < 0.06 ng/mL / 32 u/L / x     / x            PHYSICAL EXAM  Vital Signs Last 24 Hrs  T(C): 36.9 (09 Jun 2018 08:00), Max: 36.9 (09 Jun 2018 08:00)  T(F): 98.5 (09 Jun 2018 08:00), Max: 98.5 (09 Jun 2018 08:00)  HR: 69 (09 Jun 2018 11:00) (67 - 84)  BP: 92/63 (09 Jun 2018 11:00) (83/47 - 148/79)  BP(mean): 69 (09 Jun 2018 11:00) (56 - 114)  RR: 15 (09 Jun 2018 11:00) (10 - 28)  SpO2: 98% (09 Jun 2018 11:00) (91% - 100%)      Cardiovascular: Normal S1 S2, RRR  No JVD, 1/6 DIAZ murmur,  Respiratory: Lungs clear to auscultation, normal effort 	  Gastrointestinal:  Soft, Non-tender, + BS	  Extremities no edema, cyanosis, clubbing B/L LE's   , Peripheral pulses palpable 2+ bilaterally    TELEMETRY: SR  9b NSVT 	    ECG:  NSR @ 69 TWI v1-6 ?new changes in v4-6 	  RADIOLOGY:   < from: Xray Chest 1 View- PORTABLE-Urgent (06.06.18 @ 18:48) >  IMPRESSION:  Chin overlie and obscures portion of lung apices.    Uniform hazy opacity in peripheral lower left hemithorax obscuring   lateral left hemidiaphragm CP angle and left heart border related to   prominent epicardial fat as demonstrated on chestCT from 5/9/2018.     Sharp right CP angle. Clear remaining visualized lungs. No pneumothorax.    Heart size and mediastinal width inaccurately assessed on this projection.    Spinal degenerative changes again noted.    < end of copied text >    < from: CT Head No Cont (06.07.18 @ 02:40) >  Impression:     No acute intracranial hemorrhage, large cortical infarct or mass effect.   No significant interval change since 5/8/2018. If clinically indicated,   short-term follow-up or MRI may be obtained for further evaluation.    < end of copied text >      DIAGNOSTIC TESTING:  [ ] Echocardiogram:  [ ]  Catheterization:  [ ] Stress Test:    OTHER: 	      ASSESSMENT/PLAN: 	53y Female  with PmHx of cerebral palsy, intellectual disability, seizures and is non-verbal presented to [facility] ED after 4 seizures in less than 24 hours.  called for abnormal EKG     CE neg x 2  check echo   BLD cx neg x2 @ 48 hrs   UCX no growth to date  orthostatics supine--> sit neg   monitor on tele   F/u neuro recommendations     EEG in progress   noted NSVT    monitor electrolytes     supplement as required     Keep K>/=4 and Mag >/= 2

## 2018-06-09 NOTE — PROCEDURE NOTE - NSPROCDETAILS_GEN_ALL_CORE
location identified, draped/prepped, sterile technique used/blood seen on insertion/dressing applied/flushes easily/secured in place/sterile technique, catheter placed/ultrasound utilization

## 2018-06-09 NOTE — PROGRESS NOTE ADULT - SUBJECTIVE AND OBJECTIVE BOX
INTERVAL HPI/OVERNIGHT EVENTS: Still seizing   Vital Signs Last 24 Hrs  T(C): 36.9 (09 Jun 2018 08:00), Max: 36.9 (09 Jun 2018 08:00)  T(F): 98.5 (09 Jun 2018 08:00), Max: 98.5 (09 Jun 2018 08:00)  HR: 69 (09 Jun 2018 11:00) (67 - 84)  BP: 92/63 (09 Jun 2018 11:00) (83/47 - 148/79)  BP(mean): 69 (09 Jun 2018 11:00) (56 - 114)  RR: 15 (09 Jun 2018 11:00) (10 - 28)  SpO2: 98% (09 Jun 2018 11:00) (91% - 100%)  I&O's Summary    08 Jun 2018 07:01  -  09 Jun 2018 07:00  --------------------------------------------------------  IN: 1900 mL / OUT: 0 mL / NET: 1900 mL      MEDICATIONS  (STANDING):  AQUAPHOR (petrolatum Ointment) 1 Application(s) Topical two times a day  artificial  tears Solution 2 Drop(s) Both EYES three times a day  aspirin enteric coated 81 milliGRAM(s) Oral daily  benztropine 0.5 milliGRAM(s) Oral two times a day  buDESOnide   0.5 milliGRAM(s) Respule 0.5 milliGRAM(s) Inhalation daily  clotrimazole 1% Cream 1 Application(s) Topical two times a day  dextrose 5% + sodium chloride 0.9%. 1000 milliLiter(s) (100 mL/Hr) IV Continuous <Continuous>  enoxaparin Injectable 40 milliGRAM(s) SubCutaneous daily  fluticasone propionate 50 MICROgram(s)/spray Nasal Spray 1 Spray(s) Both Nostrils two times a day  fosphenytoin IVPB 100 milliGRAM(s) PE IV Intermittent three times a day  lacosamide IVPB 300 milliGRAM(s) IV Intermittent every 12 hours  lactulose Syrup 10 Gram(s) Oral two times a day  levETIRAcetam  IVPB 1500 milliGRAM(s) IV Intermittent every 12 hours  LORazepam   Injectable 2 milliGRAM(s) IV Push once  nystatin Powder 1 Application(s) Topical two times a day  OLANZapine 7.5 milliGRAM(s) Oral at bedtime  pantoprazole  Injectable 40 milliGRAM(s) IV Push daily  sodium biphosphate Rectal Enema 1 Enema Rectal daily  valproate sodium IVPB 500 milliGRAM(s) IV Intermittent two times a day    MEDICATIONS  (PRN):  ALBUTerol/ipratropium for Nebulization 3 milliLiter(s) Nebulizer every 6 hours PRN Shortness of Breath and/or Wheezing    LABS:                        10.7   6.89  )-----------( 125      ( 09 Jun 2018 02:20 )             34.5     06-09    145  |  107  |  12  ----------------------------<  113<H>  3.5   |  30  |  0.71    Ca    8.2<L>      09 Jun 2018 02:20  Phos  3.9     06-09  Mg     1.9     06-09    TPro  5.9<L>  /  Alb  2.8<L>  /  TBili  < 0.2<L>  /  DBili  x   /  AST  18  /  ALT  16  /  AlkPhos  57  06-09            Consultant(s) Notes Reviewed:  [x ] YES  [ ] NO    PHYSICAL EXAM:  GENERAL: NAD, well-groomed, well-developed ,not in any distress ,  HEAD:  Atraumatic, Normocephalic  EYES: EOMI, PERRLA, conjunctiva and sclera clear  ENMT: No tonsillar erythema, exudates, or enlargement; Moist mucous membranes, Good dentition, No lesions  NECK: Supple, No JVD, Normal thyroid  NERVOUS SYSTEM:  sedated   CHEST/LUNG: Good air entry bilateral with no  rales, rhonchi, wheezing, or rubs  HEART: Regular rate and rhythm; No murmurs, rubs, or gallops  ABDOMEN: Soft, Nontender, Nondistended; Bowel sounds present  EXTREMITIES:  2+ Peripheral Pulses, No clubbing, cyanosis, or edema      Care Discussed with Consultants/Other Providers [ x] YES  [ ] NO

## 2018-06-09 NOTE — PROGRESS NOTE ADULT - SUBJECTIVE AND OBJECTIVE BOX
*******************************  Oniel CorriganmosAustin, PGY-1  Pager [phone]  *******************************    INTERVAL HPI/OVERNIGHT EVENTS: Patient with several more episodes of focal seizures. Patient also with one episode of desaturation due to the fact that she refused to breath.     SUBJECTIVE: Patient seen and examined at bedside.     ROS could not be assessed.     OBJECTIVE:    VITAL SIGNS:  ICU Vital Signs Last 24 Hrs  T(C): 35.6 (09 Jun 2018 04:00), Max: 36.4 (08 Jun 2018 19:53)  T(F): 96 (09 Jun 2018 04:00), Max: 97.6 (08 Jun 2018 19:53)  HR: 67 (09 Jun 2018 04:00) (67 - 84)  BP: 148/79 (09 Jun 2018 04:00) (83/47 - 148/79)  BP(mean): 79 (09 Jun 2018 04:00) (56 - 79)  ABP: --  ABP(mean): --  RR: 17 (09 Jun 2018 04:00) (15 - 23)  SpO2: 94% (09 Jun 2018 04:00) (91% - 100%)        06-08 @ 07:01  -  06-09 @ 06:35  --------------------------------------------------------  IN: 1400 mL / OUT: 0 mL / NET: 1400 mL      CAPILLARY BLOOD GLUCOSE          PHYSICAL EXAM:    General: NAD  HEENT: NC/AT; PERRL, clear conjunctiva  Neck: supple  Respiratory: CTA b/l  Cardiovascular: +S1/S2; RRR  Abdomen: soft, NT/ND; +BS x4  Extremities: WWP, 2+ peripheral pulses b/l; no LE edema  Skin: normal color and turgor; no rash  Neurological:    MEDICATIONS:  MEDICATIONS  (STANDING):  AQUAPHOR (petrolatum Ointment) 1 Application(s) Topical two times a day  artificial  tears Solution 2 Drop(s) Both EYES three times a day  aspirin enteric coated 81 milliGRAM(s) Oral daily  benztropine 0.5 milliGRAM(s) Oral two times a day  buDESOnide   0.5 milliGRAM(s) Respule 0.5 milliGRAM(s) Inhalation daily  clotrimazole 1% Cream 1 Application(s) Topical two times a day  dextrose 5% + sodium chloride 0.9%. 1000 milliLiter(s) (100 mL/Hr) IV Continuous <Continuous>  enoxaparin Injectable 40 milliGRAM(s) SubCutaneous daily  fluticasone propionate 50 MICROgram(s)/spray Nasal Spray 1 Spray(s) Both Nostrils two times a day  fosphenytoin IVPB 500 milliGRAM(s) PE IV Intermittent two times a day  furosemide   Injectable 20 milliGRAM(s) IV Push daily  lactulose Syrup 10 Gram(s) Oral two times a day  levETIRAcetam  IVPB 1000 milliGRAM(s) IV Intermittent every 12 hours  nystatin Powder 1 Application(s) Topical two times a day  OLANZapine 7.5 milliGRAM(s) Oral at bedtime  pantoprazole  Injectable 40 milliGRAM(s) IV Push daily  petrolatum Ophthalmic Ointment 1 Application(s) Both EYES two times a day  sodium biphosphate Rectal Enema 1 Enema Rectal daily  valproate sodium IVPB 500 milliGRAM(s) IV Intermittent two times a day    MEDICATIONS  (PRN):  ALBUTerol/ipratropium for Nebulization 3 milliLiter(s) Nebulizer every 6 hours PRN Shortness of Breath and/or Wheezing      ALLERGIES:  Allergies    Topamax (Unknown)    Intolerances        LABS:                        10.7   6.89  )-----------( 125      ( 09 Jun 2018 02:20 )             34.5     06-09    145  |  107  |  12  ----------------------------<  113<H>  3.5   |  30  |  0.71    Ca    8.2<L>      09 Jun 2018 02:20  Phos  3.9     06-09  Mg     1.9     06-09    TPro  5.9<L>  /  Alb  2.8<L>  /  TBili  < 0.2<L>  /  DBili  x   /  AST  18  /  ALT  16  /  AlkPhos  57  06-09          RADIOLOGY & ADDITIONAL TESTS: Reviewed. *******************************  Oniel CorriganmosAustin, PGY-1  Pager [phone]  *******************************    INTERVAL HPI/OVERNIGHT EVENTS: Patient with several more episodes of focal seizures. Patient also with multiple episodes of desaturation due to the fact that she refused to breath.     SUBJECTIVE: Patient seen and examined at bedside.     ROS could not be assessed.     OBJECTIVE:    VITAL SIGNS:  ICU Vital Signs Last 24 Hrs  T(C): 35.6 (09 Jun 2018 04:00), Max: 36.4 (08 Jun 2018 19:53)  T(F): 96 (09 Jun 2018 04:00), Max: 97.6 (08 Jun 2018 19:53)  HR: 67 (09 Jun 2018 04:00) (67 - 84)  BP: 148/79 (09 Jun 2018 04:00) (83/47 - 148/79)  BP(mean): 79 (09 Jun 2018 04:00) (56 - 79)  ABP: --  ABP(mean): --  RR: 17 (09 Jun 2018 04:00) (15 - 23)  SpO2: 94% (09 Jun 2018 04:00) (91% - 100%)        06-08 @ 07:01  -  06-09 @ 06:35  --------------------------------------------------------  IN: 1400 mL / OUT: 0 mL / NET: 1400 mL      CAPILLARY BLOOD GLUCOSE              PHYSICAL EXAM:  GENERAL: NAD, well-developed  HEAD:  Atraumatic, Normocephalic  EYES: EOMI, PERRLA, conjunctiva and sclera clear  NECK: Supple, No JVD  CHEST/LUNG: Clear to auscultation bilaterally; No wheeze  HEART: Regular rate and rhythm; No murmurs, rubs, or gallops  ABDOMEN: Soft, Nontender, Nondistended; Bowel sounds present  EXTREMITIES:  2+ Peripheral Pulses, No clubbing, cyanosis, or edema  PSYCH: unable to assess  NEUROLOGY: nonverbal   SKIN: No rashes or lesions          MEDICATIONS:  MEDICATIONS  (STANDING):  AQUAPHOR (petrolatum Ointment) 1 Application(s) Topical two times a day  artificial  tears Solution 2 Drop(s) Both EYES three times a day  aspirin enteric coated 81 milliGRAM(s) Oral daily  benztropine 0.5 milliGRAM(s) Oral two times a day  buDESOnide   0.5 milliGRAM(s) Respule 0.5 milliGRAM(s) Inhalation daily  clotrimazole 1% Cream 1 Application(s) Topical two times a day  dextrose 5% + sodium chloride 0.9%. 1000 milliLiter(s) (100 mL/Hr) IV Continuous <Continuous>  enoxaparin Injectable 40 milliGRAM(s) SubCutaneous daily  fluticasone propionate 50 MICROgram(s)/spray Nasal Spray 1 Spray(s) Both Nostrils two times a day  fosphenytoin IVPB 500 milliGRAM(s) PE IV Intermittent two times a day  furosemide   Injectable 20 milliGRAM(s) IV Push daily  lactulose Syrup 10 Gram(s) Oral two times a day  levETIRAcetam  IVPB 1000 milliGRAM(s) IV Intermittent every 12 hours  nystatin Powder 1 Application(s) Topical two times a day  OLANZapine 7.5 milliGRAM(s) Oral at bedtime  pantoprazole  Injectable 40 milliGRAM(s) IV Push daily  petrolatum Ophthalmic Ointment 1 Application(s) Both EYES two times a day  sodium biphosphate Rectal Enema 1 Enema Rectal daily  valproate sodium IVPB 500 milliGRAM(s) IV Intermittent two times a day    MEDICATIONS  (PRN):  ALBUTerol/ipratropium for Nebulization 3 milliLiter(s) Nebulizer every 6 hours PRN Shortness of Breath and/or Wheezing      ALLERGIES:  Allergies    Topamax (Unknown)    Intolerances        LABS:                        10.7   6.89  )-----------( 125      ( 09 Jun 2018 02:20 )             34.5     06-09    145  |  107  |  12  ----------------------------<  113<H>  3.5   |  30  |  0.71    Ca    8.2<L>      09 Jun 2018 02:20  Phos  3.9     06-09  Mg     1.9     06-09    TPro  5.9<L>  /  Alb  2.8<L>  /  TBili  < 0.2<L>  /  DBili  x   /  AST  18  /  ALT  16  /  AlkPhos  57  06-09          RADIOLOGY & ADDITIONAL TESTS: Reviewed.

## 2018-06-09 NOTE — PROGRESS NOTE ADULT - ASSESSMENT
53F with hx seizure d/o with acute worsening of seizures, seizure cluster of 28 seizures today. Loaded with fosphenytoin and MICU consulted, would benefit from MICU for closer monitoring, VEEG, given already on maximal therapy and if continues to seize may require intubation with propofol and/or versed.     Neuro:  -persistent seizure activity  -neuro following  -received loading dose fosphenytoin  -continue fosphenytoin 500mg BID, keppra 1000mg BID, depakote 500mg BID  -Ativan PRN  -If continues to seize, may require intubation, propofol/versed  -Check CPK, lactate with AM labs  -VEEG pending    Cardiovascular:  -Cardiology following for abnormal EKG on admission  -Trending cardiac enzymes  -Pending TTE    Respiratory:  -Continue duonebs PRN    GI:  -NPO until seizures controlled, currently on dysphagia 3 diet  -cont home pantoprazole    Renal:  -No active issues    Endocrine:  -No active issues    ID:  -No active issues, monitor off abx    Heme:  -No active issues     DVT PPX:  -On Lovenox SQ    Jannie Watson, PGY3  Emergency Medicine  [id number] 53F with hx seizure d/o with acute worsening of seizures, seizure cluster of 28 seizures today. Loaded with fosphenytoin and MICU consulted, would benefit from MICU for closer monitoring, VEEG, given already on maximal therapy and if continues to seize may require intubation with propofol and/or versed.     #Neuro:  Persistent seizure activity  -neuro following  -received loading dose fosphenytoin  -continue fosphenytoin 500mg BID, keppra 1000mg BID, depakote 500mg BID  -Ativan PRN  -If continues to seize, may require intubation, propofol/versed  -VEEG pending. Per neuro, should be started this AM    #Cardiovascular:  -Cardiology following for abnormal EKG on admission  -cardiac enzymes negative x2  -Pending TTE    #Respiratory:  -Continue duonebs PRN    #GI:  -NPO until seizures controlled, currently on dysphagia 3 diet  -home pantoprazole transitioned to IV    #Renal:  -No active issues    #Endocrine:  -No active issues    #ID:  -No active issues, monitor off abx    #Heme:  -No active issues     #DVT PPX:  -Lovenox

## 2018-06-09 NOTE — PROGRESS NOTE ADULT - ASSESSMENT
54 y/o female with PmHx of cerebral palsy and seizures had 4 seizure-like episodes in less than 24 hours.     Problem/Plan - 1:  ·  Problem: Recurrent Seizures.  Plan: Management per neurology and Epilepsy specialist .      Problem/Plan - 2:  ·  Problem: Hypothermia.  Plan: [place], F/U Blood Cx negative so far  Urine Cx negative so far. Will hold off Abx for now as d/w Attending.      Problem/Plan - 3:  ·  Problem: Abnormal EKG.  Plan: Monitor on Telemetry, F/U Serial CE's, TTE pending.  Cardiology helping.      Problem/Plan - 4:  ·  Problem: Cerebral palsy, unspecified type.  Plan: Wheel chair bound. Fall precautions. Continue Cogentin. Pt incontinent and requires briefs.      Problem/Plan - 5:  ·  Problem: Intermittent asthma without complication, unspecified asthma severity.  Plan: Stable. Continue ventolin PRN, budesonide.      Problem/Plan - 6:  Problem: Mood disturbance. Plan: Continue Olanzapine.     Problem/Plan - 7:  ·  Problem: Constipation, unspecified constipation type.  Plan: Continue colace, Senna Miralax and Lactulose, MOM & fleet enema prn.      Problem/Plan - 8:  ·  Problem: Prophylactic measure.  Plan: DVT prophylaxis with enoxaparin. Continue ASA, furosemide, Nexium, Flonase, calcium + vitamin D, ferrous sulfate, folic acid, multivitamin and vitamin B1.

## 2018-06-09 NOTE — EEG REPORT - NS EEG TEXT BOX
[facility]  Report of Routine EEG with Video    [facility]: [address], [address], [place] [address], Phone: [phone]  [facility]: [address], [place] [address], Phone: [phone]  Office: [address] [facility], [facility] [address], [place] [address], Phone: [phone]    Patient Name: NAYANA JACQUES    Age: 53 y  : 1965  Patient ID: -, MRN #: -, Location: [place] [id number]  Referring Physician: KARLOS COMER    EEG #: [id number]  Study Date: 2018		    Technical Information:					  On Instrument: -  Placement and Labeling of Electrodes:  The EEG was performed utilizing 20 channels referential EEG connections (coronal over temporal over parasagittal montage) using all standard 10-20 electrode placements with EKG.  Recording was at a sampling rate of 256 samples per second per channel.  Time synchronized digital video recording was done simultaneously with EEG recording.  A low light infrared camera was used for low light recording.  Valerio and seizure detection algorithms were utilized.    History:  THIS IS A ROUTINE EEG  52 YO FEMALE  [facility]  HX- CEREBRAL PALSY, SEIZURES, NON-VERBAL  PT ON KEPPRA  PT VERY AGITATED/DIFFICULT  PT ON NASAL O2  PT ON CARDIAC MONITOR  CONCERN FOR SEIZURE    Medication	  DEPACON	  FOSPHENYTOIN	  HHEPARIN	  Keppra	    Study Interpretation:    FINDINGS:  The background was continuous, spontaneously variable and reactive. There was no PDR. The background consisted of continuous diffuse polymorphic and rhythmic delta and theta frequencies.     Sleep Background:  Drowsiness and Stage II sleep transients were not recorded.    Other Non-Epileptiform Findings:  None were present.    Interictal Epileptiform Activity:   None were present.    Events:  5 seizures recorded (12:20:45; 12:27:14, 12:36:28, 12:42:19, 12:48:45), 1 of unclear onset 3 of right hemispheric onset and 1 w/ left hemispheric onset. Ave Duration ~1min 30sec    Seizure 1: occurred at the beginning of the recording. The electrographic onset could not be visualized and seizure evolution was obscured by electrographic onset. Clinically, difficult to characterized semiology given poor video quality and no sound    Seizure 2-4: ictal onset and evolution marked by either semi rhythmic delta or abrupt onset of alpha/beta activity over the right hemisphere. Clinically: poor video quality and no sound. Patient noted to have clonic activity w/ lateral head flexion to the left, facial grimacing, possibly left arm clonic activity    Seizure 5: ictal onset marked by abrupt alpha/beta fast activity over the left hemisphere, evolving in amplitude and frequency. Clinically: as noted above.     Activation Procedures:   Hyperventilation was not performed.    Photic stimulation was not performed.    Artifacts:  Intermittent myogenic and movement artifacts were noted.    ECG:  The heart rate on single channel ECG was predominantly between 60-70 BPM.    EEG Summary/Classification:  Abnormal EEG in awake state  -	5 electroclinical seizures, one with unclear onset (caught at beginning of recording), one with left hemisphere and 3 with right hemispheric onset. Clinical semiology unclear by video  -	Moderate to severe background slowing     EEG Impression/Clinical Correlate:  1.	There were 5 electroclinical seizures captured, one from the left hemisphere and three with onset in the right hemisphere. Electrographic seizure of the first seizure was not captured in this recording.  2.	There is evidence of moderate to severe nonspecific diffuse or multifocal cerebral dysfunction    Neurology Team Notified.     Mary Walker DO  Neurophysiology Fellow    Ciro Quiñonez MD  Attending Physician

## 2018-06-10 LAB
B-OH-BUTYR SERPL-SCNC: 0 MMOL/L — SIGNIFICANT CHANGE UP (ref 0–0.4)
PHENYTOIN FREE SERPL-MCNC: 3.6 UG/ML — LOW (ref 10–20)
SPECIMEN SOURCE: SIGNIFICANT CHANGE UP
VALPROATE SERPL-MCNC: 76.9 UG/ML — SIGNIFICANT CHANGE UP (ref 50–100)

## 2018-06-10 PROCEDURE — 99291 CRITICAL CARE FIRST HOUR: CPT

## 2018-06-10 PROCEDURE — 95951: CPT | Mod: 26

## 2018-06-10 RX ORDER — VALPROIC ACID (AS SODIUM SALT) 250 MG/5ML
1000 SOLUTION, ORAL ORAL EVERY 12 HOURS
Refills: 0 | Status: DISCONTINUED | OUTPATIENT
Start: 2018-06-10 | End: 2018-06-17

## 2018-06-10 RX ADMIN — Medication 2 DROP(S): at 22:20

## 2018-06-10 RX ADMIN — Medication 1 SPRAY(S): at 17:37

## 2018-06-10 RX ADMIN — NYSTATIN CREAM 1 APPLICATION(S): 100000 CREAM TOPICAL at 06:32

## 2018-06-10 RX ADMIN — Medication 0.5 MILLIGRAM(S): at 11:37

## 2018-06-10 RX ADMIN — Medication 1 ENEMA: at 13:13

## 2018-06-10 RX ADMIN — Medication 1 APPLICATION(S): at 17:36

## 2018-06-10 RX ADMIN — LACOSAMIDE 160 MILLIGRAM(S): 50 TABLET ORAL at 14:48

## 2018-06-10 RX ADMIN — LEVETIRACETAM 400 MILLIGRAM(S): 250 TABLET, FILM COATED ORAL at 13:12

## 2018-06-10 RX ADMIN — Medication 1 SPRAY(S): at 06:33

## 2018-06-10 RX ADMIN — FOSPHENYTOIN 104 MILLIGRAM(S) PE: 50 INJECTION INTRAMUSCULAR; INTRAVENOUS at 06:33

## 2018-06-10 RX ADMIN — Medication 30 MILLIGRAM(S): at 20:03

## 2018-06-10 RX ADMIN — Medication 2 DROP(S): at 13:14

## 2018-06-10 RX ADMIN — SODIUM CHLORIDE 100 MILLILITER(S): 9 INJECTION, SOLUTION INTRAVENOUS at 17:37

## 2018-06-10 RX ADMIN — LACOSAMIDE 160 MILLIGRAM(S): 50 TABLET ORAL at 03:55

## 2018-06-10 RX ADMIN — PANTOPRAZOLE SODIUM 40 MILLIGRAM(S): 20 TABLET, DELAYED RELEASE ORAL at 13:12

## 2018-06-10 RX ADMIN — NYSTATIN CREAM 1 APPLICATION(S): 100000 CREAM TOPICAL at 17:37

## 2018-06-10 RX ADMIN — SODIUM CHLORIDE 100 MILLILITER(S): 9 INJECTION, SOLUTION INTRAVENOUS at 19:36

## 2018-06-10 RX ADMIN — Medication 2 DROP(S): at 06:33

## 2018-06-10 RX ADMIN — FOSPHENYTOIN 104 MILLIGRAM(S) PE: 50 INJECTION INTRAMUSCULAR; INTRAVENOUS at 13:13

## 2018-06-10 RX ADMIN — Medication 1 APPLICATION(S): at 06:32

## 2018-06-10 RX ADMIN — FOSPHENYTOIN 104 MILLIGRAM(S) PE: 50 INJECTION INTRAMUSCULAR; INTRAVENOUS at 22:21

## 2018-06-10 RX ADMIN — ENOXAPARIN SODIUM 40 MILLIGRAM(S): 100 INJECTION SUBCUTANEOUS at 13:12

## 2018-06-10 RX ADMIN — Medication 28.75 MILLIGRAM(S): at 08:10

## 2018-06-10 NOTE — PROGRESS NOTE ADULT - SUBJECTIVE AND OBJECTIVE BOX
INTERVAL HPI/OVERNIGHT EVENTS: no new concerns.   Vital Signs Last 24 Hrs  T(C): 35 (10 Phillip 2018 18:00), Max: 36.1 (10 Phillip 2018 03:47)  T(F): 95 (10 Phillip 2018 18:00), Max: 97 (10 Phillip 2018 03:47)  HR: 70 (10 Phillip 2018 18:00) (58 - 82)  BP: 123/93 (10 Phillip 2018 18:00) (74/33 - 130/107)  BP(mean): 100 (10 Phillip 2018 18:00) (43 - 112)  RR: 18 (10 Phillip 2018 18:00) (11 - 27)  SpO2: 95% (10 Phillip 2018 18:00) (89% - 100%)  I&O's Summary    2018 07:  -  10 Phillip 2018 07:00  --------------------------------------------------------  IN: 2600 mL / OUT: 200 mL / NET: 2400 mL    10 Phillip 2018 07:01  -  10 Phillip 2018 19:48  --------------------------------------------------------  IN: 1250 mL / OUT: 0 mL / NET: 1250 mL      MEDICATIONS  (STANDING):  AQUAPHOR (petrolatum Ointment) 1 Application(s) Topical two times a day  artificial  tears Solution 2 Drop(s) Both EYES three times a day  aspirin enteric coated 81 milliGRAM(s) Oral daily  benztropine 0.5 milliGRAM(s) Oral two times a day  buDESOnide   0.5 milliGRAM(s) Respule 0.5 milliGRAM(s) Inhalation daily  clotrimazole 1% Cream 1 Application(s) Topical two times a day  dextrose 5% + sodium chloride 0.9%. 1000 milliLiter(s) (100 mL/Hr) IV Continuous <Continuous>  enoxaparin Injectable 40 milliGRAM(s) SubCutaneous daily  fluticasone propionate 50 MICROgram(s)/spray Nasal Spray 1 Spray(s) Both Nostrils two times a day  fosphenytoin IVPB 100 milliGRAM(s) PE IV Intermittent three times a day  lacosamide IVPB 300 milliGRAM(s) IV Intermittent every 12 hours  lactulose Syrup 10 Gram(s) Oral two times a day  levETIRAcetam  IVPB 1500 milliGRAM(s) IV Intermittent every 12 hours  nystatin Powder 1 Application(s) Topical two times a day  OLANZapine 7.5 milliGRAM(s) Oral at bedtime  pantoprazole  Injectable 40 milliGRAM(s) IV Push daily  sodium biphosphate Rectal Enema 1 Enema Rectal daily  valproate sodium IVPB 1000 milliGRAM(s) IV Intermittent every 12 hours    MEDICATIONS  (PRN):  ALBUTerol/ipratropium for Nebulization 3 milliLiter(s) Nebulizer every 6 hours PRN Shortness of Breath and/or Wheezing  LORazepam   Injectable 1 milliGRAM(s) IV Push three times a day PRN seizure    LABS:                        12.3   6.02  )-----------( 117      ( 2018 23:00 )             38.5         144  |  106  |  7   ----------------------------<  111<H>  3.7   |  29  |  0.57    Ca    7.8<L>      2018 23:00  Phos  2.9       Mg     1.7         TPro  6.7  /  Alb  3.1<L>  /  TBili  < 0.2<L>  /  DBili  x   /  AST  24  /  ALT  21  /  AlkPhos  66        Urinalysis Basic - ( 2018 22:40 )    Color: PLYEL / Appearance: CLEAR / S.014 / pH: 7.5  Gluc: TRACE / Ketone: TRACE  / Bili: NEGATIVE / Urobili: NORMAL mg/dL   Blood: NEGATIVE / Protein: NEGATIVE mg/dL / Nitrite: NEGATIVE   Leuk Esterase: NEGATIVE / RBC: 0-2 / WBC x   Sq Epi: OCC / Non Sq Epi: x / Bacteria: x      CAPILLARY BLOOD GLUCOSE            Urinalysis Basic - ( 2018 22:40 )    Color: PLYEL / Appearance: CLEAR / S.014 / pH: 7.5  Gluc: TRACE / Ketone: TRACE  / Bili: NEGATIVE / Urobili: NORMAL mg/dL   Blood: NEGATIVE / Protein: NEGATIVE mg/dL / Nitrite: NEGATIVE   Leuk Esterase: NEGATIVE / RBC: 0-2 / WBC x   Sq Epi: OCC / Non Sq Epi: x / Bacteria: x      Consultant(s) Notes Reviewed:  [x ] YES  [ ] NO    PHYSICAL EXAM:  GENERAL: NAD, not in any distress ,  HEAD:  Atraumatic, Normocephalic  NECK: Supple, No JVD, Normal thyroid  NERVOUS SYSTEM:  Sedated   CHEST/LUNG: Good air entry bilateral with no  rales, rhonchi, wheezing, or rubs  HEART: Regular rate and rhythm; No murmurs, rubs, or gallops  ABDOMEN: Soft, Nontender, Nondistended; Bowel sounds present  EXTREMITIES:  2+ Peripheral Pulses, No clubbing, cyanosis, or edema  SKIN: No rashes or lesions    Care Discussed with Consultants/Other Providers [ x] YES  [ ] NO

## 2018-06-10 NOTE — PROGRESS NOTE ADULT - SUBJECTIVE AND OBJECTIVE BOX
CHIEF COMPLAINT:    Interval Events:    REVIEW OF SYSTEMS:  [ ] All other systems negative  [x] Unable to assess ROS because nonverbal/mental status     OBJECTIVE:  ICU Vital Signs Last 24 Hrs  T(C): 36.1 (10 Phillip 2018 03:47), Max: 36.9 (2018 08:00)  T(F): 97 (10 Phillip 2018 03:47), Max: 98.5 (2018 08:00)  HR: 80 (10 Phillip 2018 06:00) (58 - 82)  BP: 96/44 (10 Phillip 2018 06:00) (78/42 - 139/71)  BP(mean): 55 (10 Phillip 2018 06:00) (50 - 112)  ABP: --  ABP(mean): --  RR: 21 (10 Phillip 2018 06:00) (11 - 28)  SpO2: 96% (10 Phillip 2018 06:00) (89% - 100%)        -09 @ 07:01  -  06-10 @ 07:00  --------------------------------------------------------  IN: 2300 mL / OUT: 200 mL / NET: 2100 mL      CAPILLARY BLOOD GLUCOSE    PHYSICAL EXAM:  GENERAL: NAD, well-developed  HEAD:  Atraumatic, Normocephalic  EYES: EOMI, PERRLA, conjunctiva and sclera clear  NECK: Supple, No JVD  CHEST/LUNG: Clear to auscultation bilaterally; No wheeze  HEART: Regular rate and rhythm; No murmurs, rubs, or gallops  ABDOMEN: Soft, Nontender, Nondistended; Bowel sounds present  EXTREMITIES:  2+ Peripheral Pulses, No clubbing, cyanosis, or edema  PSYCH: unable to assess  NEUROLOGY: nonverbal   SKIN: No rashes or lesions    LINES:    HOSPITAL MEDICATIONS:  aspirin enteric coated 81 milliGRAM(s) Oral daily  enoxaparin Injectable 40 milliGRAM(s) SubCutaneous daily  ALBUTerol/ipratropium for Nebulization 3 milliLiter(s) Nebulizer every 6 hours PRN  buDESOnide   0.5 milliGRAM(s) Respule 0.5 milliGRAM(s) Inhalation daily  benztropine 0.5 milliGRAM(s) Oral two times a day  fosphenytoin IVPB 100 milliGRAM(s) PE IV Intermittent three times a day  lacosamide IVPB 300 milliGRAM(s) IV Intermittent every 12 hours  levETIRAcetam  IVPB 1500 milliGRAM(s) IV Intermittent every 12 hours  LORazepam   Injectable 1 milliGRAM(s) IV Push three times a day PRN  OLANZapine 7.5 milliGRAM(s) Oral at bedtime  valproate sodium IVPB 750 milliGRAM(s) IV Intermittent two times a day  lactulose Syrup 10 Gram(s) Oral two times a day  pantoprazole  Injectable 40 milliGRAM(s) IV Push daily  sodium biphosphate Rectal Enema 1 Enema Rectal daily  dextrose 5% + sodium chloride 0.9%. 1000 milliLiter(s) IV Continuous <Continuous>  AQUAPHOR (petrolatum Ointment) 1 Application(s) Topical two times a day  artificial  tears Solution 2 Drop(s) Both EYES three times a day  clotrimazole 1% Cream 1 Application(s) Topical two times a day  fluticasone propionate 50 MICROgram(s)/spray Nasal Spray 1 Spray(s) Both Nostrils two times a day  nystatin Powder 1 Application(s) Topical two times a day        LABS:                        12.3   6.02  )-----------( 117      ( 2018 23:00 )             38.5     Hgb Trend: 12.3<--, 10.7<--, 11.8<--, 11.7<--, 13.1<--  0609    144  |  106  |  7   ----------------------------<  111<H>  3.7   |  29  |  0.57    Ca    7.8<L>      2018 23:00  Phos  2.9     06-  Mg     1.7     0609    TPro  6.7  /  Alb  3.1<L>  /  TBili  < 0.2<L>  /  DBili  x   /  AST  24  /  ALT  21  /  AlkPhos  66  06-09    Creatinine Trend: 0.57<--, 0.71<--, 0.77<--, 0.70<--, 0.79<--    Urinalysis Basic - ( 2018 22:40 )    Color: PLYEL / Appearance: CLEAR / S.014 / pH: 7.5  Gluc: TRACE / Ketone: TRACE  / Bili: NEGATIVE / Urobili: NORMAL mg/dL   Blood: NEGATIVE / Protein: NEGATIVE mg/dL / Nitrite: NEGATIVE   Leuk Esterase: NEGATIVE / RBC: 0-2 / WBC x   Sq Epi: OCC / Non Sq Epi: x / Bacteria: x      Venous Blood Gas:   @ 23:00  7.32/62/49/27/78.7 CHIEF COMPLAINT:    Interval Events: change valproate to 1000 bid, seizure activity per EEG/neurology improved from yesterday     REVIEW OF SYSTEMS:  [ ] All other systems negative  [x] Unable to assess ROS because nonverbal/mental status     OBJECTIVE:  ICU Vital Signs Last 24 Hrs  T(C): 36.1 (10 Phillip 2018 03:47), Max: 36.9 (2018 08:00)  T(F): 97 (10 Phillip 2018 03:47), Max: 98.5 (2018 08:00)  HR: 80 (10 Phillip 2018 06:00) (58 - 82)  BP: 96/44 (10 Phillip 2018 06:00) (78/42 - 139/71)  BP(mean): 55 (10 Phillip 2018 06:00) (50 - 112)  RR: 21 (10 Phillip 2018 06:00) (11 - 28)  SpO2: 96% (10 Phillip 2018 06:00) (89% - 100%)        06-09 @ 07:01  -  06-10 @ 07:00  --------------------------------------------------------  IN: 2300 mL / OUT: 200 mL / NET: 2100 mL      CAPILLARY BLOOD GLUCOSE    PHYSICAL EXAM:  GENERAL: NAD, well-developed  HEAD:  Atraumatic, Normocephalic  EYES: EOMI, PERRLA, conjunctiva and sclera clear  NECK: Supple, No JVD  CHEST/LUNG: Clear to auscultation bilaterally; No wheeze  HEART: Regular rate and rhythm; No murmurs, rubs, or gallops  ABDOMEN: Soft, Nontender, Nondistended; Bowel sounds present  EXTREMITIES:  2+ Peripheral Pulses, No clubbing, cyanosis, or edema  PSYCH: unable to assess  NEUROLOGY: nonverbal   SKIN: No rashes or lesions    LINES:    HOSPITAL MEDICATIONS:  aspirin enteric coated 81 milliGRAM(s) Oral daily  enoxaparin Injectable 40 milliGRAM(s) SubCutaneous daily  ALBUTerol/ipratropium for Nebulization 3 milliLiter(s) Nebulizer every 6 hours PRN  buDESOnide   0.5 milliGRAM(s) Respule 0.5 milliGRAM(s) Inhalation daily  benztropine 0.5 milliGRAM(s) Oral two times a day  fosphenytoin IVPB 100 milliGRAM(s) PE IV Intermittent three times a day  lacosamide IVPB 300 milliGRAM(s) IV Intermittent every 12 hours  levETIRAcetam  IVPB 1500 milliGRAM(s) IV Intermittent every 12 hours  LORazepam   Injectable 1 milliGRAM(s) IV Push three times a day PRN  OLANZapine 7.5 milliGRAM(s) Oral at bedtime  valproate sodium IVPB 750 milliGRAM(s) IV Intermittent two times a day  lactulose Syrup 10 Gram(s) Oral two times a day  pantoprazole  Injectable 40 milliGRAM(s) IV Push daily  sodium biphosphate Rectal Enema 1 Enema Rectal daily  dextrose 5% + sodium chloride 0.9%. 1000 milliLiter(s) IV Continuous <Continuous>  AQUAPHOR (petrolatum Ointment) 1 Application(s) Topical two times a day  artificial  tears Solution 2 Drop(s) Both EYES three times a day  clotrimazole 1% Cream 1 Application(s) Topical two times a day  fluticasone propionate 50 MICROgram(s)/spray Nasal Spray 1 Spray(s) Both Nostrils two times a day  nystatin Powder 1 Application(s) Topical two times a day        LABS:                        12.3   6.02  )-----------( 117      ( 2018 23:00 )             38.5     Hgb Trend: 12.3<--, 10.7<--, 11.8<--, 11.7<--, 13.1<--      144  |  106  |  7   ----------------------------<  111<H>  3.7   |  29  |  0.57    Ca    7.8<L>      2018 23:00  Phos  2.9       Mg     1.7         TPro  6.7  /  Alb  3.1<L>  /  TBili  < 0.2<L>  /  DBili  x   /  AST  24  /  ALT  21  /  AlkPhos  66      Creatinine Trend: 0.57<--, 0.71<--, 0.77<--, 0.70<--, 0.79<--    Urinalysis Basic - ( 2018 22:40 )    Color: PLYEL / Appearance: CLEAR / S.014 / pH: 7.5  Gluc: TRACE / Ketone: TRACE  / Bili: NEGATIVE / Urobili: NORMAL mg/dL   Blood: NEGATIVE / Protein: NEGATIVE mg/dL / Nitrite: NEGATIVE   Leuk Esterase: NEGATIVE / RBC: 0-2 / WBC x   Sq Epi: OCC / Non Sq Epi: x / Bacteria: x      Venous Blood Gas:   @ 23:00  7.32/62/49/27/78.7

## 2018-06-10 NOTE — PROGRESS NOTE ADULT - ASSESSMENT
53F with hx seizure d/o with acute worsening of seizures, seizure cluster of 28 seizures today. Loaded with fosphenytoin and MICU consulted, would benefit from MICU for closer monitoring, VEEG, given already on maximal therapy and if continues to seize may require intubation with propofol and/or versed.     #Neuro:  Persistent seizure activity  -neuro following  -received loading dose fosphenytoin  -continue fosphenytoin 500mg BID, keppra 1000mg BID, depakote 500mg BID, valproate 750 BID  -Ativan 1mg tid   -If continues to seize, may require intubation, propofol/versed  -EEG with seizure activity every 3-5 minutes     #Cardiovascular:  -Cardiology following for abnormal EKG on admission  -cardiac enzymes negative x2  -Pending TTE    #Respiratory:  -Continue duonebs PRN    #GI:  -NPO until seizures controlled, currently on dysphagia 3 diet  -home pantoprazole transitioned to IV    #Renal:  -No active issues    #Endocrine:  -No active issues    #ID: hypothermia overnight to 92.9, though known side effect of valproate, will send cx   -No active issues, monitor off abx    #Heme:  -No active issues     #DVT PPX:  -Lovenox 53F with hx seizure d/o with acute worsening of seizures, seizure cluster of 28 seizures today. Loaded with fosphenytoin and MICU consulted, would benefit from MICU for closer monitoring, VEEG, given already on maximal therapy and if continues to seize may require intubation with propofol and/or versed.     #Neuro:  Persistent seizure activity  -neuro following  -received loading dose fosphenytoin  -continue fosphenytoin 500mg BID, keppra 1000mg BID, depakote 500mg BID, valproate 1000 BID  -Ativan 1mg tid   -If continues to seize, may require intubation, propofol/versed  -EEG with seizure activity every 3-5 minutes     #Cardiovascular:  -Cardiology following for abnormal EKG on admission  -cardiac enzymes negative x2  -Pending TTE    #Respiratory:  -Continue duonebs PRN    #GI:  -NPO until seizures controlled, currently on dysphagia 3 diet  -home pantoprazole transitioned to IV    #Renal:  -No active issues    #Endocrine:  -No active issues    #ID: hypothermia overnight to 92.9, though known side effect of valproate, will send cx   -No active issues, monitor off abx    #Heme:  -No active issues     #DVT PPX:  -Lovenox

## 2018-06-10 NOTE — PROGRESS NOTE ADULT - ASSESSMENT
54 y/o female with PmHx of cerebral palsy and seizures had 4 seizure-like episodes in less than 24 hours.     Problem/Plan - 1:  1.	·  Problem: Recurrent Seizures.  Plan: Management per neurology and Epilepsy specialist . S/P EEG There were 18 electroclinical and subclinical seizures captured over the span of ~3hrs: 12 from the right hemisphere and 6 with onset in the left hemisphere.   There is evidence of moderate nonspecific diffuse or multifocal cerebral dysfunction     Problem/Plan - 2:  ·  Problem: Hypothermia.  Plan: [place], F/U Blood Cx negative so far  Urine Cx negative so far. Will hold off Abx for now as d/w Attending.      Problem/Plan - 3:  ·  Problem: Abnormal EKG with NSVT .  Plan: Monitor on Telemetry, F/U Serial CE's, TTE pending.  Cardiology/EP  helping.      Problem/Plan - 4:  ·  Problem: Cerebral palsy, unspecified type.  Plan: Wheel chair bound. Fall precautions. Continue Cogentin. Pt incontinent and requires briefs.      Problem/Plan - 5:  ·  Problem: Intermittent asthma without complication, unspecified asthma severity.  Plan: Stable. Continue ventolin PRN, budesonide.      Problem/Plan - 6:  Problem: Mood disturbance. Plan: Continue Olanzapine.     Problem/Plan - 7:  ·  Problem: Constipation, unspecified constipation type.  Plan: Continue colace, Senna Miralax and Lactulose, MOM & fleet enema prn.      Problem/Plan - 8:  ·  Problem: Prophylactic measure.  Plan: DVT prophylaxis with enoxaparin. Continue ASA, furosemide, Nexium, Flonase, calcium + vitamin D, ferrous sulfate, folic acid, multivitamin and vitamin B1.

## 2018-06-10 NOTE — EEG REPORT - NS EEG TEXT BOX
Study Date: 6/9/2018	to 6/10/2018	    Technical Information:					  On Instrument: -  Placement and Labeling of Electrodes:  The EEG was performed utilizing 20 channels referential EEG connections (coronal over temporal over parasagittal montage) using all standard 10-20 electrode placements with EKG.  Recording was at a sampling rate of 256 samples per second per channel.  Time synchronized digital video recording was done simultaneously with EEG recording.  A low light infrared camera was used for low light recording.  Valerio and seizure detection algorithms were utilized.    History:  THIS IS A ROUTINE EEG  54 YO FEMALE  MICU-13  CONVULSIONS  HX- CEREBRAL PALSY, SEIZURES, NON-VERBAL  PT ON KEPPRA  PT VERY AGITATED/DIFFICULT  PT ON NASAL O2  PT ON CARDIAC MONITOR  CONCERN FOR SEIZURE    Medication	  DEPACON	  FOSPHENYTOIN	  HHEPARIN	  Keppra	    Study Interpretation:    FINDINGS:  The background was continuous, spontaneously variable and reactive. There was no PDR. The background consisted of continuous diffuse polymorphic and rhythmic delta and theta frequencies.     Sleep Background:  Drowsiness and Stage II sleep transients were not recorded.    Other Non-Epileptiform Findings:  None were present.    Interictal Epileptiform Activity:   None were present.    Events:     18 seizures captured between 13:21 and 15:49: 9 electro-clinical seizures with right hemispheric onset (times listed below), 3 electrographic seizures with right hemispheric onset and 6 electrographic seizures with left hemispheric onset. The patient pulled off electrodes at 16:09    EEG Description: Burst of alpha/beta activity over the left or the right hemisphere, progressing to admixture of semirhythmic theta/delta activity, maximally evolving over the temporal regions . Duration ~15-30 sec    Clinical Description (times listed below ): video quality poor and no sound audible on recording. Exhibits non-lateralizing clonic head and arm movements.     13:21:00    13:29:20  	  13:41:24  	  13:49:08  	  13:57:51    14:04:10    14:20:18  	  14:26:12  	  14:53:52  	    Activation Procedures:   Hyperventilation was not performed.    Photic stimulation was not performed.    Artifacts:  Intermittent myogenic and movement artifacts were noted.    ECG:  The heart rate on single channel ECG was predominantly between 60-70 BPM.    EEG Summary/Classification:  Abnormal EEG in awake state   - 	Total of 18 seizures captured: 9 electro-clinical seizures with right hemispheric onset (clinical semiology unclear), 3 electrographic seizures with right hemispheric onset and 6 electrographic seizures with left hemispheric onset.  -          Moderate to severe background slowing     EEG Impression/Clinical Correlate:  1.	There were 18 electroclinical and subclinical seizures captured over the span of ~3hrs: 12 from the right hemisphere and 6 with onset in the left hemisphere.   2.	There is evidence of moderate to severe nonspecific diffuse or multifocal cerebral dysfunction    Electrodes pulled off at 16:09    Mary Walker DO  Neurophysiology Fellow    Ciro Quiñonez MD  Attending Physician Study Date: 6/9/2018	to 6/10/2018	    Technical Information:					  On Instrument: -  Placement and Labeling of Electrodes:  The EEG was performed utilizing 20 channels referential EEG connections (coronal over temporal over parasagittal montage) using all standard 10-20 electrode placements with EKG.  Recording was at a sampling rate of 256 samples per second per channel.  Time synchronized digital video recording was done simultaneously with EEG recording.  A low light infrared camera was used for low light recording.  Valerio and seizure detection algorithms were utilized.    History:  THIS IS A ROUTINE EEG  54 YO FEMALE  MICU-13  CONVULSIONS  HX- CEREBRAL PALSY, SEIZURES, NON-VERBAL  PT ON KEPPRA  PT VERY AGITATED/DIFFICULT  PT ON NASAL O2  PT ON CARDIAC MONITOR  CONCERN FOR SEIZURE    Medication	  DEPACON	  FOSPHENYTOIN	  HHEPARIN	  Keppra	    Study Interpretation:    FINDINGS:  The background was continuous, spontaneously variable and reactive. There was no PDR. The background consisted of continuous diffuse polymorphic and rhythmic delta and theta frequencies.     Sleep Background:  Drowsiness and Stage II sleep transients were not recorded.    Other Non-Epileptiform Findings:  None were present.    Interictal Epileptiform Activity:   None were present.    Events:     18 seizures captured between 13:21 and 15:49: 9 electro-clinical seizures with right hemispheric onset (times listed below), 3 electrographic seizures with right hemispheric onset and 6 electrographic seizures with left hemispheric onset. The patient pulled off electrodes at 16:09    EEG Description: Burst of alpha/beta activity over the left or the right hemisphere, progressing to admixture of semirhythmic theta/delta activity, at times maximally evolving over the temporal regions . Duration ~15-30 sec    Clinical Description (times listed below ): video quality poor and no sound audible on recording. Possibly exhibits non-lateralizing clonic head and arm movements.     13:21:00    13:29:20  	  13:41:24  	  13:49:08  	  13:57:51    14:04:10    14:20:18  	  14:26:12  	  14:53:52  	    Right Onset:         Activation Procedures:   Hyperventilation was not performed.    Photic stimulation was not performed.    Artifacts:  Intermittent myogenic and movement artifacts were noted.    ECG:  The heart rate on single channel ECG was predominantly between 60-70 BPM.    EEG Summary/Classification:  Abnormal EEG in awake state   - 	Total of 18 seizures captured: 9 electro-clinical seizures with right hemispheric onset (clinical semiology unclear), 3 electrographic seizures with right hemispheric onset and 6 electrographic seizures with left hemispheric onset.  -          Moderate to severe background slowing     EEG Impression/Clinical Correlate:  1.	There were 18 electroclinical and subclinical seizures captured over the span of ~3hrs: 12 from the right hemisphere and 6 with onset in the left hemisphere.   2.	There is evidence of moderate to severe nonspecific diffuse or multifocal cerebral dysfunction    Electrodes pulled off at 16:09    Mayr Walker DO  Neurophysiology Fellow    Ciro Quiñonez MD  Attending Physician Study Date: 6/9/2018	to 6/10/2018	    Technical Information:					  On Instrument: -  Placement and Labeling of Electrodes:  The EEG was performed utilizing 20 channels referential EEG connections (coronal over temporal over parasagittal montage) using all standard 10-20 electrode placements with EKG.  Recording was at a sampling rate of 256 samples per second per channel.  Time synchronized digital video recording was done simultaneously with EEG recording.  A low light infrared camera was used for low light recording.  Valerio and seizure detection algorithms were utilized.    History:  THIS IS A ROUTINE EEG  52 YO FEMALE  MICU-13  CONVULSIONS  HX- CEREBRAL PALSY, SEIZURES, NON-VERBAL  PT ON KEPPRA  PT VERY AGITATED/DIFFICULT  PT ON NASAL O2  PT ON CARDIAC MONITOR  CONCERN FOR SEIZURE    Medication	  DEPACON	  FOSPHENYTOIN	  HHEPARIN	  Keppra	    Study Interpretation:    FINDINGS:  The background was continuous, spontaneously variable and reactive. There was no PDR. The background consisted of continuous diffuse polymorphic and rhythmic delta and theta frequencies.     Sleep Background:  Drowsiness and Stage II sleep transients were not recorded.    Other Non-Epileptiform Findings:  None were present.    Interictal Epileptiform Activity:   None were present.    Events:     18 seizures captured between 13:21 and 15:49: Nine electro-clinical seizures with right hemispheric onset (times listed below), 3 electrographic seizures with right hemispheric onset and 6 electrographic seizures with left hemispheric onset. The patient pulled off electrodes at 16:09    EEG Description: Burst of alpha/beta activity over the left or the right hemisphere, progressing to admixture of semirhythmic theta/delta activity, at times maximally evolving over the temporal regions . Duration ~15-30 sec    Clinical Description (times listed below ): video quality poor and no sound audible on recording. Possibly exhibits non-lateralizing clonic head and arm movements.     13:21:00    13:29:20  	  13:41:24  	  13:49:08  	  13:57:51    14:04:10    14:20:18  	  14:26:12  	  14:53:52  	      Activation Procedures:   Hyperventilation was not performed.    Photic stimulation was not performed.    Artifacts:  Intermittent myogenic and movement artifacts were noted.    ECG:  The heart rate on single channel ECG was predominantly between 60-70 BPM.    EEG Summary/Classification:  Abnormal EEG in awake state   - 	Total of 18 seizures captured: 9 electro-clinical seizures with right hemispheric onset (clinical semiology unclear), 3 electrographic seizures with right hemispheric onset and 6 electrographic seizures with left hemispheric onset.  -          Moderate background slowing     EEG Impression/Clinical Correlate:  1.	There were 18 electroclinical and subclinical seizures captured over the span of ~3hrs: 12 from the right hemisphere and 6 with onset in the left hemisphere.   2.	There is evidence of moderate nonspecific diffuse or multifocal cerebral dysfunction    Electrodes pulled off at 16:09    Mary Walker DO  Neurophysiology Fellow    Ciro Quiñonez MD  Attending Physician

## 2018-06-10 NOTE — PROGRESS NOTE ADULT - SUBJECTIVE AND OBJECTIVE BOX
Neurology Progress    NAYANA JACQUES53yFemale    HPI:  54 y/o female with PmHx of cerebral palsy, intellectual disability, seizures and is non-verbal presented to [facility] ED after 4 seizures in less than 24 hours. Seizures occurred yesterday at ~0200, ~0700, ~1200 and today at ~0500. With each seizure the pt was described as being more confused than baseline for about 5 minutes as per facility RN. This seizure activity was the same as previous seizures witnessed by RN and other staff. Pt normally has seizure activity 1-2x per month. There was also incontinence with each seizure activity, but pt is normally incontinent for urine and stool.     Pt had similar seizures last month and she was hospitalized from -. Pt was discharged with an increased keppra dose to 1000 mg BID and was also started on Depakaote 500mg BID in place of her carbamzepine. Since that time she saw her PCP around May 30th per facility RN and the PCP lowered the pt's Keppra dose from 1000 mg to 750mg due to her Keppra levels being too high and also continued her carbamazepine with depakote reportedly.     Facility RN denies any recent travel, evidence of recent infections, syncope, weight changes, night sweats, PND, vomiting, constipation, trauma or changes in other medications. (2018 07:46)      Past Medical History  Intellectual disability  Constipation  Seizure disorder  Cerebral palsy      Past Surgical History  No significant past surgical history      MEDICATIONS    ALBUTerol/ipratropium for Nebulization 3 milliLiter(s) Nebulizer every 6 hours PRN  AQUAPHOR (petrolatum Ointment) 1 Application(s) Topical two times a day  artificial  tears Solution 2 Drop(s) Both EYES three times a day  aspirin enteric coated 81 milliGRAM(s) Oral daily  benztropine 0.5 milliGRAM(s) Oral two times a day  buDESOnide   0.5 milliGRAM(s) Respule 0.5 milliGRAM(s) Inhalation daily  clotrimazole 1% Cream 1 Application(s) Topical two times a day  dextrose 5% + sodium chloride 0.9%. 1000 milliLiter(s) IV Continuous <Continuous>  enoxaparin Injectable 40 milliGRAM(s) SubCutaneous daily  fluticasone propionate 50 MICROgram(s)/spray Nasal Spray 1 Spray(s) Both Nostrils two times a day  fosphenytoin IVPB 100 milliGRAM(s) PE IV Intermittent three times a day  lacosamide IVPB 300 milliGRAM(s) IV Intermittent every 12 hours  lactulose Syrup 10 Gram(s) Oral two times a day  levETIRAcetam  IVPB 1500 milliGRAM(s) IV Intermittent every 12 hours  LORazepam   Injectable 1 milliGRAM(s) IV Push three times a day PRN  nystatin Powder 1 Application(s) Topical two times a day  OLANZapine 7.5 milliGRAM(s) Oral at bedtime  pantoprazole  Injectable 40 milliGRAM(s) IV Push daily  sodium biphosphate Rectal Enema 1 Enema Rectal daily  valproate sodium IVPB 750 milliGRAM(s) IV Intermittent two times a day         Family history: No history of dementia, strokes, or seizures   FAMILY HISTORY:  No pertinent family history in first degree relatives    SOCIAL HISTORY -- No history of tobacco or alcohol use     Allergies    Topamax (Unknown)    Intolerances            Vital Signs Last 24 Hrs  T(C): 35.8 (10 Phillip 2018 08:00), Max: 36.2 (2018 12:00)  T(F): 96.4 (10 Phillip 2018 08:00), Max: 97.2 (2018 12:00)  HR: 81 (10 Phillip 2018 08:00) (58 - 82)  BP: 99/55 (10 Phillip 2018 08:00) (78/42 - 139/71)  BP(mean): 66 (10 Phillip 2018 08:00) (50 - 112)  RR: 19 (10 Phillip 2018 08:00) (11 - 27)  SpO2: 96% (10 Phillip 2018 08:00) (89% - 98%)        On Neurological Examination:    Mental Status - Patient is lethargic non verbal                             Cranial Nerves - Extraocular muscle intact  WINSTON Facial symmetry Tongue midline, CnV1to V3 intact gross hearing intact      Motor Exam - moves all extremeties  Right upper  5/5 throughout  Left upper 5/5 throughtout  Right lower- 5/5 throughout  Left lower 5/5 throughout  Coordination -finger to nose intact  Muscle tone - is normal all over. No asymmetry is seen.      Sensory    Bilateral intact to light touch        GENERAL Exam:     Nontoxic , No Acute Distress   	  HEENT:  normocephalic, atraumatic  		  LUNGS:	Clear bilaterally  No Wheeze      VASCULAR: no carotid brui  	  HEART:	 Normal S1S2   No murmur RRR        	  MUSCULOSKELETAL: Normal Range of Motion  	   SKIN:      Normal   No Ecchymosis               LABS:  CBC Full  -  ( 2018 23:00 )  WBC Count : 6.02 K/uL  Hemoglobin : 12.3 g/dL  Hematocrit : 38.5 %  Platelet Count - Automated : 117 K/uL  Mean Cell Volume : 101.3 fL  Mean Cell Hemoglobin : 32.4 pg  Mean Cell Hemoglobin Concentration : 31.9 %  Auto Neutrophil # : 3.30 K/uL  Auto Lymphocyte # : 1.86 K/uL  Auto Monocyte # : 0.59 K/uL  Auto Eosinophil # : 0.16 K/uL  Auto Basophil # : 0.02 K/uL  Auto Neutrophil % : 54.8 %  Auto Lymphocyte % : 30.9 %  Auto Monocyte % : 9.8 %  Auto Eosinophil % : 2.7 %  Auto Basophil % : 0.3 %    Urinalysis Basic - ( 2018 22:40 )    Color: PLYEL / Appearance: CLEAR / S.014 / pH: 7.5  Gluc: TRACE / Ketone: TRACE  / Bili: NEGATIVE / Urobili: NORMAL mg/dL   Blood: NEGATIVE / Protein: NEGATIVE mg/dL / Nitrite: NEGATIVE   Leuk Esterase: NEGATIVE / RBC: 0-2 / WBC x   Sq Epi: OCC / Non Sq Epi: x / Bacteria: x          144  |  106  |  7   ----------------------------<  111<H>  3.7   |  29  |  0.57    Ca    7.8<L>      2018 23:00  Phos  2.9     -  Mg     1.7         TPro  6.7  /  Alb  3.1<L>  /  TBili  < 0.2<L>  /  DBili  x   /  AST  24  /  ALT  21  /  AlkPhos  66  -    Hemoglobin A1C:     LIVER FUNCTIONS - ( 2018 23:00 )  Alb: 3.1 g/dL / Pro: 6.7 g/dL / ALK PHOS: 66 u/L / ALT: 21 u/L / AST: 24 u/L / GGT: x           Vitamin B12         RADIOLOGY    EKG  EEG Report:  · EEG Report		  [facility] [place]  Report of Routine EEG with Video    [facility]: [address] , [address] [place] [address], Phone: [phone]  [facility]: [address], [place] [address], Phone: [phone]  Office: [address] [facility], [facility] [address], [place] [address], Phone: [phone]    Patient Name: NAYANA JACQUES    Age: 53 y  : 1965  Patient ID: -, MRN #: -, Location: [place] [address]  Referring Physician: KARLOS COMER    EEG #: [id number]  Study Date: 2018		    Technical Information:					  On Instrument: -  Placement and Labeling of Electrodes:  The EEG was performed utilizing 20 channels referential EEG connections (coronal over temporal over parasagittal montage) using all standard 10-20 electrode placements with EKG.  Recording was at a sampling rate of 256 samples per second per channel.  Time synchronized digital video recording was done simultaneously with EEG recording.  A low light infrared camera was used for low light recording.  Valerio and seizure detection algorithms were utilized.    History:  THIS IS A ROUTINE EEG  52 YO FEMALE  [place] [id number]  CONVULSIONS  HX- CEREBRAL PALSY, SEIZURES, NON-VERBAL  PT ON KEPPRA  PT VERY AGITATED/DIFFICULT  PT ON NASAL O2  PT ON CARDIAC MONITOR  CONCERN FOR SEIZURE    Medication	  DEPACON	  FOSPHENYTOIN	  HHEPARIN	  Keppra	    Study Interpretation:    FINDINGS:  The background was continuous, spontaneously variable and reactive. There was no PDR. The background consisted of continuous diffuse polymorphic and rhythmic delta and theta frequencies.     Sleep Background:  Drowsiness and Stage II sleep transients were not recorded.    Other Non-Epileptiform Findings:  None were present.    Interictal Epileptiform Activity:   None were present.    Events:  5 seizures recorded (12:20:45; 12:27:14, 12:36:28, 12:42:19, 12:48:45), 1 of unclear onset 3 of right hemispheric onset and 1 w/ left hemispheric onset. Ave Duration ~1min 30sec    Seizure 1: occurred at the beginning of the recording. The electrographic onset could not be visualized and seizure evolution was obscured by electrographic onset. Clinically, difficult to characterized semiology given poor video quality and no sound    Seizure 2-4: ictal onset and evolution marked by either semi rhythmic delta or abrupt onset of alpha/beta activity over the right hemisphere. Clinically: poor video quality and no sound. Patient noted to have clonic activity w/ lateral head flexion to the left, facial grimacing, possibly left arm clonic activity    Seizure 5: ictal onset marked by abrupt alpha/beta fast activity over the left hemisphere, evolving in amplitude and frequency. Clinically: as noted above.     Activation Procedures:   Hyperventilation was not performed.    Photic stimulation was not performed.    Artifacts:  Intermittent myogenic and movement artifacts were noted.    ECG:  The heart rate on single channel ECG was predominantly between 60-70 BPM.    EEG Summary/Classification:  Abnormal EEG in awake state  -	5 electroclinical seizures, one with unclear onset (caught at beginning of recording), one with left hemisphere and 3 with right hemispheric onset. Clinical semiology unclear by video  -	Moderate to severe background slowing     EEG Impression/Clinical Correlate:  1.	There were 5 electroclinical seizures captured, one from the left hemisphere and three with onset in the right hemisphere. Electrographic seizure of the first seizure was not captured in this recording.  2.	There is evidence of moderate to severe nonspecific diffuse or multifocal cerebral dysfunction    Neurology Team Notified.     Mary Walker,   Neurophysiology Fellow    Ciro Quiñonez MD  Attending Physician               Electronic Signatures:  Ciro Quiñonez)  (Signed 2018 15:14)  	Authored: EEG REPORT      Last Updated: -              [facility]

## 2018-06-10 NOTE — PROGRESS NOTE ADULT - ASSESSMENT
This is a female with continued seizure    increase depakote to 1000 mg BID    continue other meds     check valproc and dilantin levels

## 2018-06-10 NOTE — EEG REPORT - NS EEG TEXT BOX
[facility]  Report of Routine EEG with Video    [facility]: [address], [address], [place] [address], Phone: [phone]  [facility]: [address] [place] [address], [place] [address], Phone: [phone]  Office: [address] [facility], [facility] [address], [place] [address], Phone: [phone]    Patient Name: Jose Mckinney    Age: 79 y  : 1939  Patient ID: -, MRN #: MR# [id number], Location: [facility] BED 07  Referring Physician: -    EEG #: [id number]  Study Date: 6/10/2018		    Technical Information:					  On Instrument: -  Placement and Labeling of Electrodes:  The EEG was performed utilizing 20 channels referential EEG connections (coronal over temporal over parasagittal montage) using all standard 10-20 electrode placements with EKG.  Recording was at a sampling rate of 256 samples per second per channel.  Time synchronized digital video recording was done simultaneously with EEG recording.  A low light infrared camera was used for low light recording.  Valerio and seizure detection algorithms were utilized.    History:  -Right MCA infarct with hemorrhagic transformation and bilateral craniotomy   Medication	  No Data.	    Study Interpretation:    FINDINGS:  The background was continuous, spontaneously variable and reactive. There was no PDR. The background consisted of continuous diffuse polymorphic delta and theta frequencies that were more prominent over the right hemisphere. There was accentuation of amplitudes and sharply contoured morphologies over the right hemisphere (breach effect)    Sleep Background:  Drowsiness and Stage II sleep transients were not recorded.    Other Non-Epileptiform Findings:  None were present.    Interictal Epileptiform Activity:   Occasional sharp wave discharges over the left parietal region (Max P3)  Rare sharp wave discharges over the left parietal-temporal region (Max P3/P7)  Rare sharp wave discharges over the right centro-parietal region (Max C4/P4)     Events:  Clinical events: None recorded.  Seizures: None recorded.    Activation Procedures:   Hyperventilation was not performed.    Photic stimulation was not performed.    Artifacts:  Intermittent myogenic and movement artifacts were noted.    ECG:  The heart rate on single channel ECG was predominantly between 60-70 BPM.    EEG Summary/Classification:  Abnormal EEG   -	Occasional sharp wave discharges over the left parietal region (Max P3)  -	Rare sharp wave discharges over the left parietal-temporal region (Max P3/P7)  -	Rare sharp wave discharges over the right centro-parietal region (Max C4/P4)   -	Continuous diffuse delta slowing, right greater than left hemisphere  -	Breach effect over the right hemisphere       EEG Impression/Clinical Correlate:  1-	Pro-epileptogenic foci in the left parietal, left parietal-temporal and right centro-parietal regions  2-	Structural abnormality in the right hemisphere  3-	Moderate to severe nonspecific diffuse or multifocal cerebral dysfunction  4-	Skull defect over the right hemisphere.   5-	No seizures recorded.     Mary Walker DO  Neurophysiology Fellow    Ciro Quiñonez MD  Attending Physician, [facility] [place]

## 2018-06-11 LAB
ALBUMIN SERPL ELPH-MCNC: 2.5 G/DL — LOW (ref 3.3–5)
ALP SERPL-CCNC: 57 U/L — SIGNIFICANT CHANGE UP (ref 40–120)
ALT FLD-CCNC: 23 U/L — SIGNIFICANT CHANGE UP (ref 4–33)
AST SERPL-CCNC: 28 U/L — SIGNIFICANT CHANGE UP (ref 4–32)
BACTERIA BLD CULT: SIGNIFICANT CHANGE UP
BACTERIA BLD CULT: SIGNIFICANT CHANGE UP
BACTERIA UR CULT: SIGNIFICANT CHANGE UP
BILIRUB SERPL-MCNC: < 0.2 MG/DL — LOW (ref 0.2–1.2)
BUN SERPL-MCNC: 3 MG/DL — LOW (ref 7–23)
BUN SERPL-MCNC: 5 MG/DL — LOW (ref 7–23)
CALCIUM SERPL-MCNC: 7.2 MG/DL — LOW (ref 8.4–10.5)
CALCIUM SERPL-MCNC: 7.6 MG/DL — LOW (ref 8.4–10.5)
CHLORIDE SERPL-SCNC: 110 MMOL/L — HIGH (ref 98–107)
CHLORIDE SERPL-SCNC: 111 MMOL/L — HIGH (ref 98–107)
CO2 SERPL-SCNC: 25 MMOL/L — SIGNIFICANT CHANGE UP (ref 22–31)
CO2 SERPL-SCNC: 26 MMOL/L — SIGNIFICANT CHANGE UP (ref 22–31)
CREAT SERPL-MCNC: 0.58 MG/DL — SIGNIFICANT CHANGE UP (ref 0.5–1.3)
CREAT SERPL-MCNC: 0.61 MG/DL — SIGNIFICANT CHANGE UP (ref 0.5–1.3)
GLUCOSE SERPL-MCNC: 139 MG/DL — HIGH (ref 70–99)
GLUCOSE SERPL-MCNC: 84 MG/DL — SIGNIFICANT CHANGE UP (ref 70–99)
HCT VFR BLD CALC: 33.3 % — LOW (ref 34.5–45)
HGB BLD-MCNC: 10.7 G/DL — LOW (ref 11.5–15.5)
LEVETIRACETAM SERPL-MCNC: 42.8 MCG/ML — SIGNIFICANT CHANGE UP (ref 12–46)
MAGNESIUM SERPL-MCNC: 1.6 MG/DL — SIGNIFICANT CHANGE UP (ref 1.6–2.6)
MCHC RBC-ENTMCNC: 32.1 % — SIGNIFICANT CHANGE UP (ref 32–36)
MCHC RBC-ENTMCNC: 32.9 PG — SIGNIFICANT CHANGE UP (ref 27–34)
MCV RBC AUTO: 102.5 FL — HIGH (ref 80–100)
NRBC # FLD: 0 — SIGNIFICANT CHANGE UP
PHENYTOIN FREE SERPL-MCNC: 4.3 UG/ML — LOW (ref 10–20)
PHENYTOIN FREE SERPL-MCNC: 4.3 UG/ML — LOW (ref 10–20)
PHOSPHATE SERPL-MCNC: 2.3 MG/DL — LOW (ref 2.5–4.5)
PLATELET # BLD AUTO: 115 K/UL — LOW (ref 150–400)
PMV BLD: 10.5 FL — SIGNIFICANT CHANGE UP (ref 7–13)
POTASSIUM SERPL-MCNC: 3.4 MMOL/L — LOW (ref 3.5–5.3)
POTASSIUM SERPL-MCNC: 3.5 MMOL/L — SIGNIFICANT CHANGE UP (ref 3.5–5.3)
POTASSIUM SERPL-SCNC: 3.4 MMOL/L — LOW (ref 3.5–5.3)
POTASSIUM SERPL-SCNC: 3.5 MMOL/L — SIGNIFICANT CHANGE UP (ref 3.5–5.3)
PREALB SERPL-MCNC: 12 MG/DL — LOW (ref 20–40)
PROT SERPL-MCNC: 5.7 G/DL — LOW (ref 6–8.3)
RBC # BLD: 3.25 M/UL — LOW (ref 3.8–5.2)
RBC # FLD: 14 % — SIGNIFICANT CHANGE UP (ref 10.3–14.5)
SODIUM SERPL-SCNC: 146 MMOL/L — HIGH (ref 135–145)
SODIUM SERPL-SCNC: 147 MMOL/L — HIGH (ref 135–145)
SPECIMEN SOURCE: SIGNIFICANT CHANGE UP
VALPROATE SERPL-MCNC: 50.7 UG/ML — SIGNIFICANT CHANGE UP (ref 50–100)
WBC # BLD: 5.34 K/UL — SIGNIFICANT CHANGE UP (ref 3.8–10.5)
WBC # FLD AUTO: 5.34 K/UL — SIGNIFICANT CHANGE UP (ref 3.8–10.5)

## 2018-06-11 PROCEDURE — 95951: CPT | Mod: 26

## 2018-06-11 PROCEDURE — 99291 CRITICAL CARE FIRST HOUR: CPT

## 2018-06-11 RX ORDER — POTASSIUM PHOSPHATE, MONOBASIC POTASSIUM PHOSPHATE, DIBASIC 236; 224 MG/ML; MG/ML
15 INJECTION, SOLUTION INTRAVENOUS ONCE
Refills: 0 | Status: COMPLETED | OUTPATIENT
Start: 2018-06-11 | End: 2018-06-11

## 2018-06-11 RX ORDER — CALCIUM GLUCONATE 100 MG/ML
2 VIAL (ML) INTRAVENOUS ONCE
Refills: 0 | Status: COMPLETED | OUTPATIENT
Start: 2018-06-11 | End: 2018-06-11

## 2018-06-11 RX ADMIN — Medication 1 APPLICATION(S): at 17:25

## 2018-06-11 RX ADMIN — Medication 2 DROP(S): at 22:15

## 2018-06-11 RX ADMIN — LACOSAMIDE 160 MILLIGRAM(S): 50 TABLET ORAL at 15:32

## 2018-06-11 RX ADMIN — Medication 200 GRAM(S): at 05:54

## 2018-06-11 RX ADMIN — Medication 30 MILLIGRAM(S): at 17:25

## 2018-06-11 RX ADMIN — FOSPHENYTOIN 104 MILLIGRAM(S) PE: 50 INJECTION INTRAMUSCULAR; INTRAVENOUS at 06:28

## 2018-06-11 RX ADMIN — Medication 30 MILLIGRAM(S): at 06:55

## 2018-06-11 RX ADMIN — FOSPHENYTOIN 104 MILLIGRAM(S) PE: 50 INJECTION INTRAMUSCULAR; INTRAVENOUS at 22:14

## 2018-06-11 RX ADMIN — SODIUM CHLORIDE 100 MILLILITER(S): 9 INJECTION, SOLUTION INTRAVENOUS at 19:51

## 2018-06-11 RX ADMIN — Medication 2 DROP(S): at 05:54

## 2018-06-11 RX ADMIN — NYSTATIN CREAM 1 APPLICATION(S): 100000 CREAM TOPICAL at 05:54

## 2018-06-11 RX ADMIN — NYSTATIN CREAM 1 APPLICATION(S): 100000 CREAM TOPICAL at 17:25

## 2018-06-11 RX ADMIN — FOSPHENYTOIN 104 MILLIGRAM(S) PE: 50 INJECTION INTRAMUSCULAR; INTRAVENOUS at 15:32

## 2018-06-11 RX ADMIN — LACOSAMIDE 160 MILLIGRAM(S): 50 TABLET ORAL at 02:33

## 2018-06-11 RX ADMIN — ENOXAPARIN SODIUM 40 MILLIGRAM(S): 100 INJECTION SUBCUTANEOUS at 11:35

## 2018-06-11 RX ADMIN — PANTOPRAZOLE SODIUM 40 MILLIGRAM(S): 20 TABLET, DELAYED RELEASE ORAL at 11:35

## 2018-06-11 RX ADMIN — Medication 1 APPLICATION(S): at 05:55

## 2018-06-11 RX ADMIN — LEVETIRACETAM 400 MILLIGRAM(S): 250 TABLET, FILM COATED ORAL at 00:03

## 2018-06-11 RX ADMIN — Medication 0.5 MILLIGRAM(S): at 09:52

## 2018-06-11 RX ADMIN — Medication 2 DROP(S): at 16:00

## 2018-06-11 RX ADMIN — Medication 1 SPRAY(S): at 17:25

## 2018-06-11 RX ADMIN — POTASSIUM PHOSPHATE, MONOBASIC POTASSIUM PHOSPHATE, DIBASIC 62.5 MILLIMOLE(S): 236; 224 INJECTION, SOLUTION INTRAVENOUS at 09:41

## 2018-06-11 RX ADMIN — Medication 1 SPRAY(S): at 05:54

## 2018-06-11 RX ADMIN — LEVETIRACETAM 400 MILLIGRAM(S): 250 TABLET, FILM COATED ORAL at 11:35

## 2018-06-11 RX ADMIN — Medication 3 MILLILITER(S): at 09:53

## 2018-06-11 RX ADMIN — Medication 1 APPLICATION(S): at 05:54

## 2018-06-11 NOTE — EEG REPORT - NS EEG TEXT BOX
Study Date: 6/10/2018	to 6/11/2018	    Technical Information:					  On Instrument: -  Placement and Labeling of Electrodes:  The EEG was performed utilizing 20 channels referential EEG connections (coronal over temporal over parasagittal montage) using all standard 10-20 electrode placements with EKG.  Recording was at a sampling rate of 256 samples per second per channel.  Time synchronized digital video recording was done simultaneously with EEG recording.  A low light infrared camera was used for low light recording.  Valerio and seizure detection algorithms were utilized.    History:  THIS IS A ROUTINE EEG  54 YO FEMALE  MICU-13  CONVULSIONS  HX- CEREBRAL PALSY, SEIZURES, NON-VERBAL  PT ON KEPPRA  PT VERY AGITATED/DIFFICULT  PT ON NASAL O2  PT ON CARDIAC MONITOR  CONCERN FOR SEIZURE    Medication	  DEPACON	  FOSPHENYTOIN	  HHEPARIN	  Keppra	    Study Interpretation:    FINDINGS:  The background was continuous, spontaneously variable and reactive. There was no PDR. The background consisted of continuous diffuse polymorphic and rhythmic delta and theta frequencies.     Sleep Background:  Drowsiness and Stage II sleep transients were not recorded.    Other Non-Epileptiform Findings:  None were present.    Interictal Epileptiform Activity:   -Sharp Waves, Generalized, Maximum Bi-Frontal, rare  -Sharp Waves, Regional, Left Frontotemporal, Maximum F7/T7, rare    Events:     9 seizures captured between 11:16 and 19:05: 2 electro-clinical seizures with right hemispheric onset, 7 electro-clinical seizures with non-localizable, diffuse onset    EEG Description: Burst of alpha/beta activity over the right hemisphere or diffusely over both hemispheres, progressing to admixture of semirhythmic alpha/theta activity, Duration ~15-30 sec    Clinical Description: video quality poor and no sound audible on recording. Possibly exhibits non-lateralizing clonic head and arm movements.     Activation Procedures:   Hyperventilation was not performed.    Photic stimulation was not performed.    Artifacts:  Intermittent myogenic and movement artifacts were noted.    ECG:  The heart rate on single channel ECG was predominantly between 60-70 BPM.    EEG Summary/Classification:  Abnormal EEG in awake state  -Sharp Waves, Generalized, Maximum Bi-Frontal, rare  -Sharp Waves, Regional, Left Frontotemporal, Maximum F7/T7, rare  -Total of 9 seizures captured: 2 electro-clinical seizures with right hemispheric onset, 7 electro-clinical seizures with non-localizable, diffuse onset  -Moderate background slowing     EEG Impression/Clinical Correlate:  1.	There were 9 electro-clinical seizures captured over the span of ~8hrs: the last seizure was recorded at 19:05 on 6/10/18   2.	There is evidence of moderate nonspecific diffuse or multifocal cerebral dysfunction    Pedro Morrison MD  Attending Physician

## 2018-06-11 NOTE — PROGRESS NOTE ADULT - SUBJECTIVE AND OBJECTIVE BOX
Neurology Progress    NAYANA JACQUES53yFemale    HPI:  52 y/o female with PmHx of cerebral palsy, intellectual disability, seizures and is non-verbal presented to [facility] ED after 4 seizures in less than 24 hours. Seizures occurred yesterday at ~0200, ~0700, ~1200 and today at ~0500. With each seizure the pt was described as being more confused than baseline for about 5 minutes as per facility RN. This seizure activity was the same as previous seizures witnessed by RN and other staff. Pt normally has seizure activity 1-2x per month. There was also incontinence with each seizure activity, but pt is normally incontinent for urine and stool.     Pt had similar seizures last month and she was hospitalized from -. Pt was discharged with an increased keppra dose to 1000 mg BID and was also started on Depakaote 500mg BID in place of her carbamzepine. Since that time she saw her PCP around May 30th per facility RN and the PCP lowered the pt's Keppra dose from 1000 mg to 750mg due to her Keppra levels being too high and also continued her carbamazepine with depakote reportedly.     Facility RN denies any recent travel, evidence of recent infections, syncope, weight changes, night sweats, PND, vomiting, constipation, trauma or changes in other medications. (2018 07:46)      Past Medical History  Intellectual disability  Constipation  Seizure disorder  Cerebral palsy      Past Surgical History  No significant past surgical history      MEDICATIONS    ALBUTerol/ipratropium for Nebulization 3 milliLiter(s) Nebulizer every 6 hours PRN  AQUAPHOR (petrolatum Ointment) 1 Application(s) Topical two times a day  artificial  tears Solution 2 Drop(s) Both EYES three times a day  aspirin enteric coated 81 milliGRAM(s) Oral daily  benztropine 0.5 milliGRAM(s) Oral two times a day  buDESOnide   0.5 milliGRAM(s) Respule 0.5 milliGRAM(s) Inhalation daily  clotrimazole 1% Cream 1 Application(s) Topical two times a day  dextrose 5% + sodium chloride 0.9%. 1000 milliLiter(s) IV Continuous <Continuous>  enoxaparin Injectable 40 milliGRAM(s) SubCutaneous daily  fluticasone propionate 50 MICROgram(s)/spray Nasal Spray 1 Spray(s) Both Nostrils two times a day  fosphenytoin IVPB 100 milliGRAM(s) PE IV Intermittent three times a day  lacosamide IVPB 300 milliGRAM(s) IV Intermittent every 12 hours  lactulose Syrup 10 Gram(s) Oral two times a day  levETIRAcetam  IVPB 1500 milliGRAM(s) IV Intermittent every 12 hours  LORazepam   Injectable 1 milliGRAM(s) IV Push three times a day PRN  nystatin Powder 1 Application(s) Topical two times a day  OLANZapine 7.5 milliGRAM(s) Oral at bedtime  pantoprazole  Injectable 40 milliGRAM(s) IV Push daily  potassium phosphate IVPB 15 milliMole(s) IV Intermittent once  sodium biphosphate Rectal Enema 1 Enema Rectal daily  valproate sodium IVPB 1000 milliGRAM(s) IV Intermittent every 12 hours         Family history: No history of dementia, strokes, or seizures   FAMILY HISTORY:  No pertinent family history in first degree relatives    SOCIAL HISTORY -- No history of tobacco or alcohol use     Allergies    Topamax (Unknown)    Intolerances            Vital Signs Last 24 Hrs  T(C): 35.8 (2018 08:00), Max: 36.1 (2018 04:00)  T(F): 96.5 (2018 08:00), Max: 97 (2018 04:00)  HR: 73 (2018 08:00) (69 - 78)  BP: 114/56 (2018 08:00) (74/33 - 134/64)  BP(mean): 69 (2018 08:00) (43 - 100)  RR: 23 (2018 08:00) (13 - 23)  SpO2: 97% (2018 08:00) (88% - 100%)        On Neurological Examination:    Mental Status - Patient lethargic but, awake,   Speech - moans and smiles                              Cranial Nerves - Extraocular muscle intact  WINSTON Facial symmetry Tongue midline, CnV1to V3 intact gross hearing intact      Motor Exam - spastic quad.      Sensory    Bilateral intact to light touch    GENERAL Exam:     Nontoxic , No Acute Distress   	  HEENT:  normocephalic, atraumatic  		  LUNGS:	Clear bilaterally  No Wheeze      VASCULAR: no carotid brui  	  HEART:	 Normal S1S2   No murmur RRR        	  MUSCULOSKELETAL: Normal Range of Motion  	   SKIN:      Normal   No Ecchymosis               LABS:  CBC Full  -  ( 2018 02:50 )  WBC Count : 5.34 K/uL  Hemoglobin : 10.7 g/dL  Hematocrit : 33.3 %  Platelet Count - Automated : 115 K/uL  Mean Cell Volume : 102.5 fL  Mean Cell Hemoglobin : 32.9 pg  Mean Cell Hemoglobin Concentration : 32.1 %  Auto Neutrophil # : x  Auto Lymphocyte # : x  Auto Monocyte # : x  Auto Eosinophil # : x  Auto Basophil # : x  Auto Neutrophil % : x  Auto Lymphocyte % : x  Auto Monocyte % : x  Auto Eosinophil % : x  Auto Basophil % : x    Urinalysis Basic - ( 2018 22:40 )    Color: PLYEL / Appearance: CLEAR / S.014 / pH: 7.5  Gluc: TRACE / Ketone: TRACE  / Bili: NEGATIVE / Urobili: NORMAL mg/dL   Blood: NEGATIVE / Protein: NEGATIVE mg/dL / Nitrite: NEGATIVE   Leuk Esterase: NEGATIVE / RBC: 0-2 / WBC x   Sq Epi: OCC / Non Sq Epi: x / Bacteria: x      -    146<H>  |  110<H>  |  5<L>  ----------------------------<  84  3.5   |  25  |  0.58    Ca    7.2<L>      2018 02:50  Phos  2.3     -  Mg     1.6     06-11    TPro  6.7  /  Alb  3.1<L>  /  TBili  < 0.2<L>  /  DBili  x   /  AST  24  /  ALT  21  /  AlkPhos  66  06-09    Hemoglobin A1C:     LIVER FUNCTIONS - ( 2018 23:00 )  Alb: 3.1 g/dL / Pro: 6.7 g/dL / ALK PHOS: 66 u/L / ALT: 21 u/L / AST: 24 u/L / GGT: x           Vitamin B12         RADIOLOGY    EEG    rpretation:    FINDINGS:  The background was continuous, spontaneously variable and reactive. There was no PDR. The background consisted of continuous diffuse polymorphic and rhythmic delta and theta frequencies.     Sleep Background:  Drowsiness and Stage II sleep transients were not recorded.    Other Non-Epileptiform Findings:  None were present.    Interictal Epileptiform Activity:   None were present.    Events:     18 seizures captured between 13:21 and 15:49: Nine electro-clinical seizures with right hemispheric onset (times listed below), 3 electrographic seizures with right hemispheric onset and 6 electrographic seizures with left hemispheric onset. The patient pulled off electrodes at 16:09    EEG Description: Burst of alpha/beta activity over the left or the right hemisphere, progressing to admixture of semirhythmic theta/delta activity, at times maximally evolving over the temporal regions . Duration ~15-30 sec    Clinical Description (times listed below ): video quality poor and no sound audible on recording. Possibly exhibits non-lateralizing clonic head and arm movements.     13:21:00    13:29:20  	  13:41:24  	  13:49:08  	  13:57:51    14:04:10    14:20:18  	  14:26:12  	  14:53:52  	      Activation Procedures:   Hyperventilation was not performed.    Photic stimulation was not performed.    Artifacts:  Intermittent myogenic and movement artifacts were noted.    ECG:  The heart rate on single channel ECG was predominantly between 60-70 BPM.    EEG Summary/Classification:  Abnormal EEG in awake state   - 	Total of 18 seizures captured: 9 electro-clinical seizures with right hemispheric onset (clinical semiology unclear), 3 electrographic seizures with right hemispheric onset and 6 electrographic seizures with left hemispheric onset.  -          Moderate background slowing     EEG Impression/Clinical Correlate:  1.	There were 18 electroclinical and subclinical seizures captured over the span of ~3hrs: 12 from the right hemisphere and 6 with onset in the left hemisphere.   2.	There is evidence of moderate nonspecific diffuse or multifocal cerebral dysfunction    Electrodes pulled off at 16:09    Mary Walker DO  Neurophysiology Fellow    Ciro Quiñonez MD  Attending Physician               Electronic Signatures:  Mary Walker)  (Signed 10-Phillip-2018 12:20)  	Authored: TECH INFORMATION, EEG REPORT  Ciro Quiñonez)  (Signed 10-Phillip-2018 12:24)  	Authored: EEG REPORT  	Co-Signer: TECH INFORMATION, EEG REPORT      Last Updated: 10-Phillip-20            schoenberg

## 2018-06-11 NOTE — PROGRESS NOTE ADULT - SUBJECTIVE AND OBJECTIVE BOX
Interval Events: epileptic medication regiem adjusted yesterday, but has remained hemodynamically stable; hypothermic, but no other signs of infection; fosphenytoin level subtherapeutic, will ask Neurology about increasing it; will check Keppra level; ask Neuro about Feeds; check LFT's with next set of labs     REVIEW OF SYSTEMS:  [ ] All other systems negative  [X] Unable to assess ROS because nonverbal baseline/chemically sedated     OBJECTIVE:  ICU Vital Signs Last 24 Hrs  T(C): 35.8 (2018 08:00), Max: 36.1 (2018 04:00)  T(F): 96.5 (2018 08:00), Max: 97 (2018 04:00)  HR: 81 (:) (67 - 81)  BP: 119/69 (:) (74/33 - 134/64)  BP(mean): 79 (:) (43 - 100)  RR: 21 (:) (13 - 23)  SpO2: 93% (:) (88% - 100%)        06-10 @ 07:  -  11 @ 07:00  --------------------------------------------------------  IN: 2750 mL / OUT: 0 mL / NET: 2750 mL     @ 07:11 @ 11:17  --------------------------------------------------------  IN: 650 mL / OUT: 0 mL / NET: 650 mL      CAPILLARY BLOOD GLUCOSE    PHYSICAL EXAM:  GENERAL: NAD, well-developed  HEAD:  Atraumatic, Normocephalic  EYES: EOMI, PERRLA, conjunctiva and sclera clear  NECK: Supple, No JVD  CHEST/LUNG: Clear to auscultation bilaterally; No wheeze  HEART: Regular rate and rhythm; No murmurs, rubs, or gallops  ABDOMEN: Soft, Nontender, Nondistended; Bowel sounds present  EXTREMITIES:  2+ Peripheral Pulses, No clubbing, cyanosis, or edema  PSYCH: unable to assess  NEUROLOGY: nonverbal   SKIN: No rashes or lesions    LINES: [facility] MEDICATIONS:  aspirin enteric coated 81 milliGRAM(s) Oral daily  enoxaparin Injectable 40 milliGRAM(s) SubCutaneous rachel  ALBUTerol/ipratropium for Nebulization 3 milliLiter(s) Nebulizer every 6 hours PRN  buDESOnide   0.5 milliGRAM(s) Respule 0.5 milliGRAM(s) Inhalation daily  benztropine 0.5 milliGRAM(s) Oral two times a day  fosphenytoin IVPB 100 milliGRAM(s) PE IV Intermittent three times a day  lacosamide IVPB 300 milliGRAM(s) IV Intermittent every 12 hours  levETIRAcetam  IVPB 1500 milliGRAM(s) IV Intermittent every 12 hours  LORazepam   Injectable 1 milliGRAM(s) IV Push three times a day PRN  OLANZapine 7.5 milliGRAM(s) Oral at bedtime  valproate sodium IVPB 1000 milliGRAM(s) IV Intermittent every 12 hours  lactulose Syrup 10 Gram(s) Oral two times a day  pantoprazole  Injectable 40 milliGRAM(s) IV Push daily  sodium biphosphate Rectal Enema 1 Enema Rectal daily  dextrose 5% + sodium chloride 0.9%. 1000 milliLiter(s) IV Continuous <Continuous>  AQUAPHOR (petrolatum Ointment) 1 Application(s) Topical two times a day  artificial  tears Solution 2 Drop(s) Both EYES three times a day  clotrimazole 1% Cream 1 Application(s) Topical two times a day  fluticasone propionate 50 MICROgram(s)/spray Nasal Spray 1 Spray(s) Both Nostrils two times a day  nystatin Powder 1 Application(s) Topical two times a day        LABS:                        10.7   5.34  )-----------( 115      ( 2018 02:50 )             33.3     Hgb Trend: 10.7<--, 12.3<--, 10.7<--, 11.8<--, 11.7<--  -11    146<H>  |  110<H>  |  5<L>  ----------------------------<  84  3.5   |  25  |  0.58    Ca    7.2<L>      2018 02:50  Phos  2.3       Mg     1.6         TPro  6.7  /  Alb  3.1<L>  /  TBili  < 0.2<L>  /  DBili  x   /  AST  24  /  ALT  21  /  AlkPhos  66      Creatinine Trend: 0.58<--, 0.57<--, 0.71<--, 0.77<--, 0.70<--, 0.79<--    Urinalysis Basic - ( 2018 22:40 )    Color: PLYEL / Appearance: CLEAR / S.014 / pH: 7.5  Gluc: TRACE / Ketone: TRACE  / Bili: NEGATIVE / Urobili: NORMAL mg/dL   Blood: NEGATIVE / Protein: NEGATIVE mg/dL / Nitrite: NEGATIVE   Leuk Esterase: NEGATIVE / RBC: 0-2 / WBC x   Sq Epi: OCC / Non Sq Epi: x / Bacteria: x      Venous Blood Gas:   @ 23:00  7.32/62/49/27/78.7  VBG Lactate: --      MICROBIOLOGY: cx pending    RADIOLOGY:  [ ] Reviewed and interpreted by me    EKG:

## 2018-06-11 NOTE — PROGRESS NOTE ADULT - ASSESSMENT
53F with hx seizure d/o with acute worsening of seizures, seizure cluster of 28 seizures today. Loaded with fosphenytoin and MICU consulted, would benefit from MICU for closer monitoring, VEEG, given already on maximal therapy and if continues to seize may require intubation with propofol and/or versed.     #Neuro:  Persistent seizure activity, less than yesterday  -neuro following  -received loading dose fosphenytoin  -continue fosphenytoin 500mg BID, keppra 1500mg BID, depakote 500mg BID, valproate 1000 BID  -phenytoin level suboptimal adjust accordingly   -Ativan 1mg tid       #Cardiovascular:  -Cardiology following for abnormal EKG on admission  -cardiac enzymes negative x2  -Pending TTE    #Respiratory:  -Continue duonebs PRN    #GI:  -speak to neuro as diet needs advanced and pt is too sedated   -home pantoprazole transitioned to IV  -trend LFTs with seizure medications     #Renal:  -No active issues    #Endocrine:  -No active issues    #ID: hypothermia overnight to 92.9, though known side effect of valproate, will send cx   -No active issues, monitor off abx    #Heme:  -No active issues     #DVT PPX:  -Lovenox     Supriya Crews, PGY-1 EM

## 2018-06-11 NOTE — PROGRESS NOTE ADULT - SUBJECTIVE AND OBJECTIVE BOX
pt appears comfortable no signs of acute distress  non verbal                     MEDICATIONS  (STANDING):  AQUAPHOR (petrolatum Ointment) 1 Application(s) Topical two times a day  artificial  tears Solution 2 Drop(s) Both EYES three times a day  aspirin enteric coated 81 milliGRAM(s) Oral daily  benztropine 0.5 milliGRAM(s) Oral two times a day  buDESOnide   0.5 milliGRAM(s) Respule 0.5 milliGRAM(s) Inhalation daily  clotrimazole 1% Cream 1 Application(s) Topical two times a day  dextrose 5% + sodium chloride 0.9%. 1000 milliLiter(s) (100 mL/Hr) IV Continuous <Continuous>  enoxaparin Injectable 40 milliGRAM(s) SubCutaneous daily  fluticasone propionate 50 MICROgram(s)/spray Nasal Spray 1 Spray(s) Both Nostrils two times a day  fosphenytoin IVPB 100 milliGRAM(s) PE IV Intermittent three times a day  lacosamide IVPB 300 milliGRAM(s) IV Intermittent every 12 hours  lactulose Syrup 10 Gram(s) Oral two times a day  levETIRAcetam  IVPB 1500 milliGRAM(s) IV Intermittent every 12 hours  nystatin Powder 1 Application(s) Topical two times a day  OLANZapine 7.5 milliGRAM(s) Oral at bedtime  pantoprazole  Injectable 40 milliGRAM(s) IV Push daily  potassium phosphate IVPB 15 milliMole(s) IV Intermittent once  sodium biphosphate Rectal Enema 1 Enema Rectal daily  valproate sodium IVPB 1000 milliGRAM(s) IV Intermittent every 12 hours    MEDICATIONS  (PRN):  ALBUTerol/ipratropium for Nebulization 3 milliLiter(s) Nebulizer every 6 hours PRN Shortness of Breath and/or Wheezing  LORazepam   Injectable 1 milliGRAM(s) IV Push three times a day PRN seizure      LABS:                        10.7   5.34  )-----------( 115      ( 11 Jun 2018 02:50 )             33.3     Hemoglobin: 10.7 g/dL (06-11 @ 02:50)  Hemoglobin: 12.3 g/dL (06-09 @ 23:00)  Hemoglobin: 10.7 g/dL (06-09 @ 02:20)  Hemoglobin: 11.8 g/dL (06-08 @ 06:09)  Hemoglobin: 11.7 g/dL (06-07 @ 10:59)    06-11    146<H>  |  110<H>  |  5<L>  ----------------------------<  84  3.5   |  25  |  0.58    Ca    7.2<L>      11 Jun 2018 02:50  Phos  2.3     06-11  Mg     1.6     06-11    TPro  6.7  /  Alb  3.1<L>  /  TBili  < 0.2<L>  /  DBili  x   /  AST  24  /  ALT  21  /  AlkPhos  66  06-09    Creatinine Trend: 0.58<--, 0.57<--, 0.71<--, 0.77<--, 0.70<--, 0.79<--           PHYSICAL EXAM  Vital Signs Last 24 Hrs  T(C): 35.8 (11 Jun 2018 08:00), Max: 36.1 (11 Jun 2018 04:00)  T(F): 96.5 (11 Jun 2018 08:00), Max: 97 (11 Jun 2018 04:00)  HR: 73 (11 Jun 2018 08:00) (69 - 78)  BP: 114/56 (11 Jun 2018 08:00) (74/33 - 134/64)  BP(mean): 69 (11 Jun 2018 08:00) (43 - 100)  RR: 23 (11 Jun 2018 08:00) (13 - 23)  SpO2: 97% (11 Jun 2018 08:00) (88% - 100%)      Cardiovascular: Normal S1 S2, RRR  No JVD, 1/6 DIAZ murmur,  Respiratory: Lungs clear to auscultation, normal effort 	  Gastrointestinal:  Soft, Non-tender, + BS	  Extremities no edema, cyanosis, clubbing B/L LE's   , Peripheral pulses palpable 2+ bilaterally    TELEMETRY: SR  9b NSVT 	    ECG:  NSR @ 69 TWI v1-6 ?new changes in v4-6 	  RADIOLOGY:   < from: Xray Chest 1 View- PORTABLE-Urgent (06.06.18 @ 18:48) >  IMPRESSION:  Chin overlie and obscures portion of lung apices.    Uniform hazy opacity in peripheral lower left hemithorax obscuring   lateral left hemidiaphragm CP angle and left heart border related to   prominent epicardial fat as demonstrated on chestCT from 5/9/2018.     Sharp right CP angle. Clear remaining visualized lungs. No pneumothorax.    Heart size and mediastinal width inaccurately assessed on this projection.    Spinal degenerative changes again noted.    < end of copied text >    < from: CT Head No Cont (06.07.18 @ 02:40) >  Impression:     No acute intracranial hemorrhage, large cortical infarct or mass effect.   No significant interval change since 5/8/2018. If clinically indicated,   short-term follow-up or MRI may be obtained for further evaluation.    < end of copied text >      DIAGNOSTIC TESTING:  [ ] Echocardiogram:  [ ]  Catheterization:  [ ] Stress Test:    OTHER: 	  EEG   EEG Summary/Classification:  Abnormal EEG in awake state   - 	Total of 18 seizures captured: 9 electro-clinical seizures with right hemispheric onset (clinical semiology unclear), 3 electrographic seizures with right hemispheric onset and 6 electrographic seizures with left hemispheric onset.  -          Moderate background slowing     EEG Impression/Clinical Correlate:  1.	There were 18 electroclinical and subclinical seizures captured over the span of ~3hrs: 12 from the right hemisphere and 6 with onset in the left hemisphere.   2.	There is evidence of moderate nonspecific diffuse or multifocal cerebral dysfunction      ASSESSMENT/PLAN: 	52y/o female  with PmHx of cerebral palsy, intellectual disability, seizures , non-verbal at baseline presented to [facility] ED after 4 seizures in less than 24 hours:   called for abnormal EKG     -- The patient has ruled out for acute coronary syndrome with negative serial cardiac enzymes  -- EKG with NSR narrow QRS without acute ischemia   -- check TTE to assess LV function   --  blood and urine cx negative at this time   -- orthostatics supine--> sit neg   -- no tele events thus far   -- F/u neuro recommendations - currently video EEG in progress   -- previously with  NSVT on  tele     - keep K > 4 and Mag > 2  --cont supportive care as per MICU     Leida Sousa RPA-C

## 2018-06-11 NOTE — PROGRESS NOTE ADULT - ASSESSMENT
This is a female with status.  Staff reports improvement in seizure      obtain EEG record from last night       continue seizure medication.

## 2018-06-11 NOTE — PROGRESS NOTE ADULT - ASSESSMENT
54 y/o female with PmHx of cerebral palsy and seizures had 4 seizure-like episodes in less than 24 hours.     Problem/Plan - 1:  ·  Problem: Recurrent Seizures.  Plan: Management per neurology and Epilepsy specialist . S/P EEG There were 18 electroclinical and subclinical seizures captured over the span of ~3hrs: 12 from the right hemisphere and 6 with onset in the left hemisphere.   There is evidence of moderate nonspecific diffuse or multifocal cerebral dysfunction     Problem/Plan - 2:  ·  Problem: Hypothermia.  Plan: Resolved .  Blood Cx negative . Urine Cx negative so far. Will hold off Abx for now as d/w Attending.      Problem/Plan - 3:  ·  Problem: Abnormal EKG with NSVT .  Plan:  TTE pending.  Cardiology/EP  helping.      Problem/Plan - 4:  ·  Problem: Cerebral palsy, unspecified type.  Plan: Wheel chair bound. Supportive care .       Problem/Plan - 5:  ·  Problem: Intermittent asthma without complication, unspecified asthma severity.  Plan: Stable. Continue ventolin PRN, budesonide.      Problem/Plan - 6:  Problem: Mood disturbance. Plan: Continue Olanzapine.     Problem/Plan - 7:  ·  Problem: Constipation, unspecified constipation type.  Plan: Continue colace, Senna Miralax and Lactulose, MOM & fleet enema prn.      Problem/Plan - 8:  ·  Problem: Prophylactic measure.  Plan: DVT prophylaxis with enoxaparin. Continue ASA, furosemide, Nexium, Flonase, calcium + vitamin D, ferrous sulfate, folic acid, multivitamin and vitamin B1.

## 2018-06-11 NOTE — PROGRESS NOTE ADULT - SUBJECTIVE AND OBJECTIVE BOX
INTERVAL HPI/OVERNIGHT EVENTS: More awake .  Vital Signs Last 24 Hrs  T(C): 35.8 (2018 08:00), Max: 36.1 (2018 04:00)  T(F): 96.5 (2018 08:00), Max: 97 (2018 04:00)  HR: 81 (:00) (67 - 81)  BP: 119/69 (:00) (82/62 - 134/64)  BP(mean): 79 (:) (58 - 100)  RR: 21 (:00) (13 - 23)  SpO2: 93% (:00) (88% - 100%)  I&O's Summary    10 Phillip 2018 07:01  -  2018 07:00  --------------------------------------------------------  IN: 2750 mL / OUT: 0 mL / NET: 2750 mL    2018 07:01  -  2018 13:17  --------------------------------------------------------  IN: 650 mL / OUT: 0 mL / NET: 650 mL      MEDICATIONS  (STANDING):  AQUAPHOR (petrolatum Ointment) 1 Application(s) Topical two times a day  artificial  tears Solution 2 Drop(s) Both EYES three times a day  aspirin enteric coated 81 milliGRAM(s) Oral daily  benztropine 0.5 milliGRAM(s) Oral two times a day  buDESOnide   0.5 milliGRAM(s) Respule 0.5 milliGRAM(s) Inhalation daily  clotrimazole 1% Cream 1 Application(s) Topical two times a day  dextrose 5% + sodium chloride 0.9%. 1000 milliLiter(s) (100 mL/Hr) IV Continuous <Continuous>  enoxaparin Injectable 40 milliGRAM(s) SubCutaneous daily  fluticasone propionate 50 MICROgram(s)/spray Nasal Spray 1 Spray(s) Both Nostrils two times a day  fosphenytoin IVPB 100 milliGRAM(s) PE IV Intermittent three times a day  lacosamide IVPB 300 milliGRAM(s) IV Intermittent every 12 hours  lactulose Syrup 10 Gram(s) Oral two times a day  levETIRAcetam  IVPB 1500 milliGRAM(s) IV Intermittent every 12 hours  nystatin Powder 1 Application(s) Topical two times a day  OLANZapine 7.5 milliGRAM(s) Oral at bedtime  pantoprazole  Injectable 40 milliGRAM(s) IV Push daily  valproate sodium IVPB 1000 milliGRAM(s) IV Intermittent every 12 hours    MEDICATIONS  (PRN):  ALBUTerol/ipratropium for Nebulization 3 milliLiter(s) Nebulizer every 6 hours PRN Shortness of Breath and/or Wheezing  LORazepam   Injectable 1 milliGRAM(s) IV Push three times a day PRN seizure    LABS:                        10.7   5.34  )-----------( 115      ( 2018 02:50 )             33.3     06-11    146<H>  |  110<H>  |  5<L>  ----------------------------<  84  3.5   |  25  |  0.58    Ca    7.2<L>      2018 02:50  Phos  2.3       Mg     1.6         TPro  6.7  /  Alb  3.1<L>  /  TBili  < 0.2<L>  /  DBili  x   /  AST  24  /  ALT  21  /  AlkPhos  66        Urinalysis Basic - ( 2018 22:40 )    Color: PLYEL / Appearance: CLEAR / S.014 / pH: 7.5  Gluc: TRACE / Ketone: TRACE  / Bili: NEGATIVE / Urobili: NORMAL mg/dL   Blood: NEGATIVE / Protein: NEGATIVE mg/dL / Nitrite: NEGATIVE   Leuk Esterase: NEGATIVE / RBC: 0-2 / WBC x   Sq Epi: OCC / Non Sq Epi: x / Bacteria: x      CAPILLARY BLOOD GLUCOSE            Urinalysis Basic - ( 2018 22:40 )    Color: PLYEL / Appearance: CLEAR / S.014 / pH: 7.5  Gluc: TRACE / Ketone: TRACE  / Bili: NEGATIVE / Urobili: NORMAL mg/dL   Blood: NEGATIVE / Protein: NEGATIVE mg/dL / Nitrite: NEGATIVE   Leuk Esterase: NEGATIVE / RBC: 0-2 / WBC x   Sq Epi: OCC / Non Sq Epi: x / Bacteria: x  RADIOLOGY & ADDITIONAL TESTS:    Consultant(s) Notes Reviewed:  [x ] YES  [ ] NO    PHYSICAL EXAM:  GENERAL: NAD, obese , not in any distress ,  HEAD:  Atraumatic, Normocephalic  EYES: EOMI, PERRLA, conjunctiva and sclera clear  ENMT: No tonsillar erythema, exudates, or enlargement; Moist mucous membranes, Good dentition, No lesions  NECK: Supple, No JVD, Normal thyroid  NERVOUS SYSTEM:  More awake now ,smiling   CHEST/LUNG: Good air entry bilateral with no  rales, rhonchi, wheezing, or rubs  HEART: Regular rate and rhythm; No murmurs, rubs, or gallops  ABDOMEN: Soft, Nontender, Nondistended; Bowel sounds present  EXTREMITIES:  2+ Peripheral Pulses, No clubbing, cyanosis, or edema  SKIN: No rashes or lesions    Care Discussed with Consultants/Other Providers [ x] YES  [ ] NO

## 2018-06-12 LAB
ALBUMIN SERPL ELPH-MCNC: 2.6 G/DL — LOW (ref 3.3–5)
ALP SERPL-CCNC: 58 U/L — SIGNIFICANT CHANGE UP (ref 40–120)
ALT FLD-CCNC: 27 U/L — SIGNIFICANT CHANGE UP (ref 4–33)
AST SERPL-CCNC: 35 U/L — HIGH (ref 4–32)
BASOPHILS # BLD AUTO: 0.02 K/UL — SIGNIFICANT CHANGE UP (ref 0–0.2)
BASOPHILS NFR BLD AUTO: 0.4 % — SIGNIFICANT CHANGE UP (ref 0–2)
BILIRUB SERPL-MCNC: < 0.2 MG/DL — LOW (ref 0.2–1.2)
BUN SERPL-MCNC: 3 MG/DL — LOW (ref 7–23)
CALCIUM SERPL-MCNC: 7.4 MG/DL — LOW (ref 8.4–10.5)
CHLORIDE SERPL-SCNC: 112 MMOL/L — HIGH (ref 98–107)
CO2 SERPL-SCNC: 25 MMOL/L — SIGNIFICANT CHANGE UP (ref 22–31)
CREAT SERPL-MCNC: 0.54 MG/DL — SIGNIFICANT CHANGE UP (ref 0.5–1.3)
EOSINOPHIL # BLD AUTO: 0.25 K/UL — SIGNIFICANT CHANGE UP (ref 0–0.5)
EOSINOPHIL NFR BLD AUTO: 4.8 % — SIGNIFICANT CHANGE UP (ref 0–6)
GLUCOSE SERPL-MCNC: 96 MG/DL — SIGNIFICANT CHANGE UP (ref 70–99)
HCT VFR BLD CALC: 35.4 % — SIGNIFICANT CHANGE UP (ref 34.5–45)
HGB BLD-MCNC: 11.1 G/DL — LOW (ref 11.5–15.5)
IMM GRANULOCYTES # BLD AUTO: 0.12 # — SIGNIFICANT CHANGE UP
IMM GRANULOCYTES NFR BLD AUTO: 2.3 % — HIGH (ref 0–1.5)
LYMPHOCYTES # BLD AUTO: 2.05 K/UL — SIGNIFICANT CHANGE UP (ref 1–3.3)
LYMPHOCYTES # BLD AUTO: 39.2 % — SIGNIFICANT CHANGE UP (ref 13–44)
MCHC RBC-ENTMCNC: 31.4 % — LOW (ref 32–36)
MCHC RBC-ENTMCNC: 33.3 PG — SIGNIFICANT CHANGE UP (ref 27–34)
MCV RBC AUTO: 106.3 FL — HIGH (ref 80–100)
MONOCYTES # BLD AUTO: 0.61 K/UL — SIGNIFICANT CHANGE UP (ref 0–0.9)
MONOCYTES NFR BLD AUTO: 11.7 % — SIGNIFICANT CHANGE UP (ref 2–14)
NEUTROPHILS # BLD AUTO: 2.18 K/UL — SIGNIFICANT CHANGE UP (ref 1.8–7.4)
NEUTROPHILS NFR BLD AUTO: 41.6 % — LOW (ref 43–77)
NRBC # FLD: 0 — SIGNIFICANT CHANGE UP
PLATELET # BLD AUTO: 104 K/UL — LOW (ref 150–400)
PMV BLD: 10.3 FL — SIGNIFICANT CHANGE UP (ref 7–13)
POTASSIUM SERPL-MCNC: 3.6 MMOL/L — SIGNIFICANT CHANGE UP (ref 3.5–5.3)
POTASSIUM SERPL-SCNC: 3.6 MMOL/L — SIGNIFICANT CHANGE UP (ref 3.5–5.3)
PROT SERPL-MCNC: 6 G/DL — SIGNIFICANT CHANGE UP (ref 6–8.3)
RBC # BLD: 3.33 M/UL — LOW (ref 3.8–5.2)
RBC # FLD: 14.1 % — SIGNIFICANT CHANGE UP (ref 10.3–14.5)
SODIUM SERPL-SCNC: 147 MMOL/L — HIGH (ref 135–145)
WBC # BLD: 5.23 K/UL — SIGNIFICANT CHANGE UP (ref 3.8–10.5)
WBC # FLD AUTO: 5.23 K/UL — SIGNIFICANT CHANGE UP (ref 3.8–10.5)

## 2018-06-12 PROCEDURE — 99233 SBSQ HOSP IP/OBS HIGH 50: CPT

## 2018-06-12 PROCEDURE — 95951: CPT | Mod: 26

## 2018-06-12 PROCEDURE — 99291 CRITICAL CARE FIRST HOUR: CPT

## 2018-06-12 RX ORDER — FOSPHENYTOIN 50 MG/ML
100 INJECTION INTRAMUSCULAR; INTRAVENOUS ONCE
Refills: 0 | Status: COMPLETED | OUTPATIENT
Start: 2018-06-12 | End: 2018-06-12

## 2018-06-12 RX ORDER — FOSPHENYTOIN 50 MG/ML
100 INJECTION INTRAMUSCULAR; INTRAVENOUS ONCE
Refills: 0 | Status: COMPLETED | OUTPATIENT
Start: 2018-06-13 | End: 2018-06-13

## 2018-06-12 RX ORDER — LEVETIRACETAM 250 MG/1
1000 TABLET, FILM COATED ORAL EVERY 12 HOURS
Refills: 0 | Status: DISCONTINUED | OUTPATIENT
Start: 2018-06-12 | End: 2018-06-20

## 2018-06-12 RX ADMIN — ENOXAPARIN SODIUM 40 MILLIGRAM(S): 100 INJECTION SUBCUTANEOUS at 12:24

## 2018-06-12 RX ADMIN — Medication 2 DROP(S): at 21:25

## 2018-06-12 RX ADMIN — SODIUM CHLORIDE 100 MILLILITER(S): 9 INJECTION, SOLUTION INTRAVENOUS at 06:39

## 2018-06-12 RX ADMIN — Medication 1 SPRAY(S): at 06:39

## 2018-06-12 RX ADMIN — LACOSAMIDE 160 MILLIGRAM(S): 50 TABLET ORAL at 15:22

## 2018-06-12 RX ADMIN — Medication 1 SPRAY(S): at 17:53

## 2018-06-12 RX ADMIN — Medication 1 APPLICATION(S): at 06:38

## 2018-06-12 RX ADMIN — Medication 3 MILLILITER(S): at 10:25

## 2018-06-12 RX ADMIN — SODIUM CHLORIDE 100 MILLILITER(S): 9 INJECTION, SOLUTION INTRAVENOUS at 19:31

## 2018-06-12 RX ADMIN — LACOSAMIDE 160 MILLIGRAM(S): 50 TABLET ORAL at 02:01

## 2018-06-12 RX ADMIN — Medication 0.5 MILLIGRAM(S): at 10:24

## 2018-06-12 RX ADMIN — Medication 1 MILLIGRAM(S): at 03:00

## 2018-06-12 RX ADMIN — FOSPHENYTOIN 104 MILLIGRAM(S) PE: 50 INJECTION INTRAMUSCULAR; INTRAVENOUS at 17:53

## 2018-06-12 RX ADMIN — Medication 2 DROP(S): at 15:18

## 2018-06-12 RX ADMIN — NYSTATIN CREAM 1 APPLICATION(S): 100000 CREAM TOPICAL at 17:53

## 2018-06-12 RX ADMIN — LEVETIRACETAM 400 MILLIGRAM(S): 250 TABLET, FILM COATED ORAL at 23:43

## 2018-06-12 RX ADMIN — Medication 30 MILLIGRAM(S): at 18:33

## 2018-06-12 RX ADMIN — LEVETIRACETAM 400 MILLIGRAM(S): 250 TABLET, FILM COATED ORAL at 00:02

## 2018-06-12 RX ADMIN — Medication 1 APPLICATION(S): at 17:53

## 2018-06-12 RX ADMIN — FOSPHENYTOIN 104 MILLIGRAM(S) PE: 50 INJECTION INTRAMUSCULAR; INTRAVENOUS at 06:32

## 2018-06-12 RX ADMIN — Medication 2 DROP(S): at 06:39

## 2018-06-12 RX ADMIN — LEVETIRACETAM 400 MILLIGRAM(S): 250 TABLET, FILM COATED ORAL at 12:25

## 2018-06-12 RX ADMIN — Medication 30 MILLIGRAM(S): at 07:07

## 2018-06-12 RX ADMIN — PANTOPRAZOLE SODIUM 40 MILLIGRAM(S): 20 TABLET, DELAYED RELEASE ORAL at 12:25

## 2018-06-12 RX ADMIN — NYSTATIN CREAM 1 APPLICATION(S): 100000 CREAM TOPICAL at 06:38

## 2018-06-12 NOTE — CONSULT NOTE ADULT - SUBJECTIVE AND OBJECTIVE BOX
Patient is a 53y old female with past medical history of         PAST MEDICAL & SURGICAL HISTORY:  Intellectual disability  Constipation  Seizure disorder  Cerebral palsy  No significant past surgical history      HPI:                    MEDICATIONS  (STANDING):  AQUAPHOR (petrolatum Ointment) 1 Application(s) Topical two times a day  artificial  tears Solution 2 Drop(s) Both EYES three times a day  aspirin enteric coated 81 milliGRAM(s) Oral daily  benztropine 0.5 milliGRAM(s) Oral two times a day  buDESOnide   0.5 milliGRAM(s) Respule 0.5 milliGRAM(s) Inhalation daily  clotrimazole 1% Cream 1 Application(s) Topical two times a day  dextrose 5% + sodium chloride 0.9%. 1000 milliLiter(s) (100 mL/Hr) IV Continuous <Continuous>  enoxaparin Injectable 40 milliGRAM(s) SubCutaneous daily  fluticasone propionate 50 MICROgram(s)/spray Nasal Spray 1 Spray(s) Both Nostrils two times a day  lacosamide IVPB 300 milliGRAM(s) IV Intermittent every 12 hours  lactulose Syrup 10 Gram(s) Oral two times a day  levETIRAcetam  IVPB 1000 milliGRAM(s) IV Intermittent every 12 hours  nystatin Powder 1 Application(s) Topical two times a day  OLANZapine 7.5 milliGRAM(s) Oral at bedtime  pantoprazole  Injectable 40 milliGRAM(s) IV Push daily  valproate sodium IVPB 1000 milliGRAM(s) IV Intermittent every 12 hours    MEDICATIONS  (PRN):  ALBUTerol/ipratropium for Nebulization 3 milliLiter(s) Nebulizer every 6 hours PRN Shortness of Breath and/or Wheezing  LORazepam   Injectable 1 milliGRAM(s) IV Push three times a day PRN seizure      FAMILY HISTORY:  No pertinent family history in first degree relatives      SOCIAL HISTORY:    CIGARETTES:    ALCOHOL:    REVIEW OF SYSTEMS:    CONSTITUTIONAL: No fever, weight loss, chills, shakes, or fatigue  EYES: No eye pain, visual disturbances, or discharge  ENMT:  No difficulty hearing, tinnitus, vertigo; No sinus or throat pain  NECK: No pain or stiffness  BREASTS: No pain, masses, or nipple discharge  RESPIRATORY: No cough, wheezing, hemoptysis, or shortness of breath  CARDIOVASCULAR: No chest pain, dyspnea, palpitations, dizziness, syncope, paroxysmal nocturnal dyspnea, orthopnea, or arm or leg swelling  GASTROINTESTINAL: No abdominal  or epigastric pain, nausea, vomiting, hematemesis, diarrhea, constipation, melena or bright red blood.  GENITOURINARY: No dysuria, nocturia, hematuria, or urinary incontinence  NEUROLOGICAL: No headaches, memory loss, slurred speech, limb weakness, loss of strength, numbness, or tremors  SKIN: No itching, burning, rashes, or lesions   LYMPH NODES: No enlarged glands  ENDOCRINE: No heat or cold intolerance, or hair loss  MUSCULOSKELETAL: No joint pain or swelling, muscle, back, or extremity pain  PSYCHIATRIC: No depression, anxiety, or difficulty sleeping  HEME/LYMPH: No easy bruising or bleeding gums  ALLERY AND IMMUNOLOGIC: No hives or rash.      Vital Signs Last 24 Hrs  T(C): 34.6 (12 Jun 2018 16:00), Max: 35.4 (12 Jun 2018 08:00)  T(F): 94.2 (12 Jun 2018 16:00), Max: 95.8 (12 Jun 2018 08:00)  HR: 56 (12 Jun 2018 19:00) (56 - 71)  BP: 111/67 (12 Jun 2018 19:00) (101/55 - 144/73)  BP(mean): 77 (12 Jun 2018 19:00) (65 - 102)  RR: 19 (12 Jun 2018 19:00) (10 - 21)  SpO2: 96% (12 Jun 2018 19:00) (93% - 99%)    PHYSICAL EXAM:    GENERAL: In no apparent distress, well nourished, and hydrated.  HEAD:  Atraumatic, Normocephalic  EYES: EOMI, PERRLA, conjunctiva and sclera clear  ENMT: No tonsillar erythema, exudates, or enlargement; Moist mucous membranes, Good dentition, No lesions  NECK: Supple and normal thyroid.  No JVD or carotid bruit.  Carotid pulse is 2+ bilaterally.  HEART: Regular rate and rhythm; No murmurs, rubs, or gallops.  PULMONARY: Clear to auscultation and perfusion.  No rales, wheezing, or rhonchi bilaterally.  ABDOMEN: Soft, Nontender, Nondistended; Bowel sounds present  EXTREMITIES:  2+ Peripheral Pulses, No clubbing, cyanosis, or edema  LYMPH: No lymphadenopathy noted  NEUROLOGICAL: Grossly nonfocal          INTERPRETATION OF TELEMETRY:    ECG:    I&O's Detail    11 Jun 2018 07:01  -  12 Jun 2018 07:00  --------------------------------------------------------  IN:    dextrose 5% + sodium chloride 0.9%.: 2200 mL    IV PiggyBack: 530 mL  Total IN: 2730 mL    OUT:  Total OUT: 0 mL    Total NET: 2730 mL      12 Jun 2018 07:01  -  12 Jun 2018 19:42  --------------------------------------------------------  IN:    dextrose 5% + sodium chloride 0.9%.: 1200 mL  Total IN: 1200 mL    OUT:  Total OUT: 0 mL    Total NET: 1200 mL          LABS:                        11.1   5.23  )-----------( 104      ( 12 Jun 2018 03:25 )             35.4     06-12    147<H>  |  112<H>  |  3<L>  ----------------------------<  96  3.6   |  25  |  0.54    Ca    7.4<L>      12 Jun 2018 03:25  Phos  2.3     06-11  Mg     1.6     06-11    TPro  6.0  /  Alb  2.6<L>  /  TBili  < 0.2<L>  /  DBili  x   /  AST  35<H>  /  ALT  27  /  AlkPhos  58  06-12            BNP  I&O's Detail    11 Jun 2018 07:01  -  12 Jun 2018 07:00  --------------------------------------------------------  IN:    dextrose 5% + sodium chloride 0.9%.: 2200 mL    IV PiggyBack: 530 mL  Total IN: 2730 mL    OUT:  Total OUT: 0 mL    Total NET: 2730 mL                    RADIOLOGY & ADDITIONAL STUDIES:  PREVIOUS DIAGNOSTIC TESTING:      ECHO  FINDINGS:    STRESS  FINDINGS:    CATHETERIZATION  FINDINGS:      ASSESSMENT:        RECOMMENDATIONS: Patient is a 53y old female with past medical history of cerebral palsy, intellectual disability, seizure disorder, nonverbal at baseline who presented to [facility] ED with 4 seizures within 24 hours with increased confusion after each seizure. She was admitted last month for cluster seizures at which time her Keppra was increased and she was started on Depakote in place of carbamazepine. As per her aide, she has been sleeping more than usual since being discharged.  This admission , she had an episode of nonsustained VT x 9 beats and multiple episodes of bradycardia at 30-40 b/min.  She has remained hemodynamically stable and does not appear to be symptomatic.  Currently she is in sinus bradycardia at 58 b/min and with minimal movement her heart rate increases appropriately. Her blood pressure is stable. Her aide, who sits at the bedside, has not seen any behavior suggesting distress or pain.        PAST MEDICAL & SURGICAL HISTORY:  Intellectual disability  Constipation  Seizure disorder  Cerebral palsy  No significant past surgical history    MEDICATIONS  (STANDING):  AQUAPHOR (petrolatum Ointment) 1 Application(s) Topical two times a day  artificial  tears Solution 2 Drop(s) Both EYES three times a day  aspirin enteric coated 81 milliGRAM(s) Oral daily  benztropine 0.5 milliGRAM(s) Oral two times a day  buDESOnide   0.5 milliGRAM(s) Respule 0.5 milliGRAM(s) Inhalation daily  clotrimazole 1% Cream 1 Application(s) Topical two times a day  dextrose 5% + sodium chloride 0.9%. 1000 milliLiter(s) (100 mL/Hr) IV Continuous <Continuous>  enoxaparin Injectable 40 milliGRAM(s) SubCutaneous daily  fluticasone propionate 50 MICROgram(s)/spray Nasal Spray 1 Spray(s) Both Nostrils two times a day  lacosamide IVPB 300 milliGRAM(s) IV Intermittent every 12 hours  lactulose Syrup 10 Gram(s) Oral two times a day  levETIRAcetam  IVPB 1000 milliGRAM(s) IV Intermittent every 12 hours  nystatin Powder 1 Application(s) Topical two times a day  OLANZapine 7.5 milliGRAM(s) Oral at bedtime  pantoprazole  Injectable 40 milliGRAM(s) IV Push daily  valproate sodium IVPB 1000 milliGRAM(s) IV Intermittent every 12 hours    MEDICATIONS  (PRN):  ALBUTerol/ipratropium for Nebulization 3 milliLiter(s) Nebulizer every 6 hours PRN Shortness of Breath and/or Wheezing  LORazepam   Injectable 1 milliGRAM(s) IV Push three times a day PRN seizure      FAMILY HISTORY:  No pertinent family history in first degree relatives      SOCIAL HISTORY:  Patient lives in a home.    CIGARETTES:  never  DRUGS:    never  ALCOHOL:     never    REVIEW OF SYSTEMS: obtained fro the chart    CONSTITUTIONAL: No fever, weight loss, chills, shakes, or fatigue  EYES: No eye pain, visual disturbances, or discharge  ENMT:  No difficulty hearing, tinnitus, vertigo; No sinus or throat pain  NECK: No pain or stiffness  BREASTS: No pain, masses, or nipple discharge  RESPIRATORY: No cough, wheezing, hemoptysis, or shortness of breath  CARDIOVASCULAR: No chest pain, dyspnea, palpitations, dizziness, syncope, paroxysmal nocturnal dyspnea, orthopnea, or arm or leg swelling  GASTROINTESTINAL: No abdominal  or epigastric pain, nausea, vomiting, hematemesis, diarrhea, constipation, melena or bright red blood.  GENITOURINARY: No dysuria, nocturia, hematuria, or urinary incontinence  NEUROLOGICAL: Longstanding seizure disorder. Until recently, she would average 1-2 seizures/month. Seizure have become more frequent despite medication adjustment.  SKIN: No itching, burning, rashes, or lesions   LYMPH NODES: No enlarged glands  ENDOCRINE: No heat or cold intolerance, or hair loss  MUSCULOSKELETAL: No joint pain or swelling, muscle, back, or extremity pain  PSYCHIATRIC: No depression, anxiety, or difficulty sleeping  HEME/LYMPH: No easy bruising or bleeding gums  ALLERGY AND IMMUNOLOGIC: No hives or rash.      Vital Signs Last 24 Hrs  T(C): 34.6 (12 Jun 2018 16:00), Max: 35.4 (12 Jun 2018 08:00)  T(F): 94.2 (12 Jun 2018 16:00), Max: 95.8 (12 Jun 2018 08:00)  HR: 56 (12 Jun 2018 19:00) (56 - 71)  BP: 111/67 (12 Jun 2018 19:00) (101/55 - 144/73)  BP(mean): 77 (12 Jun 2018 19:00) (65 - 102)  RR: 19 (12 Jun 2018 19:00) (10 - 21)  SpO2: 96% (12 Jun 2018 19:00) (93% - 99%)    PHYSICAL EXAM:    GENERAL: In no apparent distress, well nourished, and hydrated. Nonverbal and lethargic  HEAD:  Atraumatic, Normocephalic  EYES: Could not examine.  Patient would not open eyes.  NECK: Supple and normal thyroid.  No JVD or carotid bruit.   HEART: Regular rate and rhythm; No murmurs, rubs, or gallops.  PULMONARY: Clear to auscultation and perfusion.  No rales, wheezing, or rhonchi bilaterally.  ABDOMEN: Soft, Nontender, Nondistended; Bowel sounds present  EXTREMITIES:  2+ Peripheral Pulses, No clubbing, cyanosis, or edema            INTERPRETATION OF TELEMETRY:   Sinus rhythm 58-64 b/min    ECG:   Sinus rhythm 67 beats/min Narrow QRS 80ms.  First degree AV block Borderline low voltage.           LABS:                        11.1   5.23  )-----------( 104      ( 12 Jun 2018 03:25 )             35.4     06-12    147<H>  |  112<H>  |  3<L>  ----------------------------<  96  3.6   |  25  |  0.54    Ca    7.4<L>      12 Jun 2018 03:25  Phos  2.3     06-11  Mg     1.6     06-11    TPro  6.0  /  Alb  2.6<L>  /  TBili  < 0.2<L>  /  DBili  x   /  AST  35<H>  /  ALT  27  /  AlkPhos  58  06-12        RADIOLOGY & ADDITIONAL STUDIES:  PREVIOUS DIAGNOSTIC TESTING:      ECHO  FINDINGS:    pending

## 2018-06-12 NOTE — PROGRESS NOTE ADULT - ASSESSMENT
52 y/o female with PmHx of cerebral palsy and seizures had 4 seizure-like episodes in less than 24 hours.     Problem/Plan - 1:  ·  Problem: Recurrent Seizures.  Plan: Management per neurology and Epilepsy specialist . S/P EEG There were 18 electroclinical and subclinical seizures captured over the span of ~3hrs: 12 from the right hemisphere and 6 with onset in the left hemisphere.   There is evidence of moderate nonspecific diffuse or multifocal cerebral dysfunction     Problem/Plan - 2:  ·  Problem: Hypothermia.  Plan: Resolved .  Blood Cx negative . Urine Cx negative so far.       Problem/Plan - 3:  ·  Problem: Abnormal EKG with NSVT with Bradyarrhythmia .  Plan:  TTE pending.  Cardiology/EP  helping.      Problem/Plan - 4:  ·  Problem: Cerebral palsy, unspecified type.  Plan: Wheel chair bound. Supportive care .       Problem/Plan - 5:  ·  Problem: Intermittent asthma without complication, unspecified asthma severity.  Plan: Stable. Continue ventolin PRN, budesonide.      Problem/Plan - 6:  Problem: Mood disturbance. Plan: Continue Olanzapine.     Problem/Plan - 7:  ·  Problem: Constipation, unspecified constipation type.  Plan: Continue colace, Senna Miralax and Lactulose, MOM & fleet enema prn.      Problem/Plan - 8:  ·  Problem: Prophylactic measure.  Plan: DVT prophylaxis with enoxaparin. Continue ASA, furosemide, Nexium, Flonase, calcium + vitamin D, ferrous sulfate, folic acid, multivitamin and vitamin B1.

## 2018-06-12 NOTE — PROGRESS NOTE ADULT - ASSESSMENT
53F with hx seizure d/o with acute worsening of seizures, seizure cluster of 28 seizures today. Loaded with fosphenytoin and MICU consulted, would benefit from MICU for closer monitoring, VEEG, given already on maximal therapy and if continues to seize may require intubation with propofol and/or versed.     #Neuro:  Persistent seizure activity, less than yesterday  -neuro following  -received loading dose fosphenytoin  -continue fosphenytoin 500mg BID, keppra 1500mg BID, depakote 500mg BID, valproate 1000 BID  -phenytoin level suboptimal adjust accordingly   -Ativan 1mg tid       #Cardiovascular:  -Cardiology following for abnormal EKG on admission  -cardiac enzymes negative x2  -Pending TTE    #Respiratory:  -Continue duonebs PRN    #GI:  -speak to neuro as diet needs advanced and pt is too sedated   -home pantoprazole transitioned to IV  -trend LFTs with seizure medications     #Renal:  -No active issues    #Endocrine:  -No active issues    #ID: hypothermia overnight to 92.9, though known side effect of valproate, will send cx   -No active issues, monitor off abx    #Heme:  -No active issues     #DVT PPX:  -Lovenox     Bobby Ennis MD   PGY1  Pager: [phone] 53F with hx seizure d/o with acute worsening of seizures. Admitted to MICU for monitoring in case of need for propofol and/or versed requiring intubation.     #Neuro:  - persistent seizure activity seen on vEEG.   - neuro following.  - As patient heavily sedated, decreased keppra to 1000mg BID.  - fosphenytoin being titrated off: 100mg BID today and 100mg once tomorrow.   - will consider decreasing Vinpat dose as can cause OK prolongation. Will await EP recs.   - continue with valproate 1000 BID.   - Ativan 1mg tid PRN.     #Cardiovascular:  - episode of bradycardia to 30s, nonsustained overnight with tele showing possible 2nd degree AV block Type II.   - EP called. Appreciate recs.     #Respiratory:  - no active issues.   -Continue duonebs PRN.    #GI:  -NGT attempted today for feeding.   -Keppra decreased as above to prevent over-sedation so that patient can eat.   -c/w PPI.   -trend LFTs with seizure medications.    #Renal:  -No active issues.    #Endocrine:  -No active issues.    #ID:   - hypothermia overnight. Known side effect of valproate.  - BCx/UCx 6/9 NGTD.   - monitor off abx.    #Heme:  -No active issues.    #DVT PPX:  -Lovenox.    Bobby Ennis MD   PGY1  Pager: [phone]

## 2018-06-12 NOTE — PROGRESS NOTE ADULT - SUBJECTIVE AND OBJECTIVE BOX
Neurology Progress    NAYANA [id number]    HPI:  54 y/o female with PmHx of cerebral palsy, intellectual disability, seizures and is non-verbal presented to [facility] ED after 4 seizures in less than 24 hours. Seizures occurred yesterday at ~0200, ~0700, ~1200 and today at ~0500. With each seizure the pt was described as being more confused than baseline for about 5 minutes as per facility RN. This seizure activity was the same as previous seizures witnessed by RN and other staff. Pt normally has seizure activity 1-2x per month. There was also incontinence with each seizure activity, but pt is normally incontinent for urine and stool.     Pt had similar seizures last month and she was hospitalized from 5/8-11. Pt was discharged with an increased keppra dose to 1000 mg BID and was also started on Depakaote 500mg BID in place of her carbamzepine. Since that time she saw her PCP around May 30th per facility RN and the PCP lowered the pt's Keppra dose from 1000 mg to 750mg due to her Keppra levels being too high and also continued her carbamazepine with depakote reportedly.     Facility RN denies any recent travel, evidence of recent infections, syncope, weight changes, night sweats, PND, vomiting, constipation, trauma or changes in other medications. (07 Jun 2018 07:46)      Past Medical History  Intellectual disability  Constipation  Seizure disorder  Cerebral palsy      Past Surgical History  No significant past surgical history      MEDICATIONS    ALBUTerol/ipratropium for Nebulization 3 milliLiter(s) Nebulizer every 6 hours PRN  AQUAPHOR (petrolatum Ointment) 1 Application(s) Topical two times a day  artificial  tears Solution 2 Drop(s) Both EYES three times a day  aspirin enteric coated 81 milliGRAM(s) Oral daily  benztropine 0.5 milliGRAM(s) Oral two times a day  buDESOnide   0.5 milliGRAM(s) Respule 0.5 milliGRAM(s) Inhalation daily  clotrimazole 1% Cream 1 Application(s) Topical two times a day  dextrose 5% + sodium chloride 0.9%. 1000 milliLiter(s) IV Continuous <Continuous>  enoxaparin Injectable 40 milliGRAM(s) SubCutaneous daily  fluticasone propionate 50 MICROgram(s)/spray Nasal Spray 1 Spray(s) Both Nostrils two times a day  fosphenytoin IVPB 100 milliGRAM(s) PE IV Intermittent three times a day  lacosamide IVPB 300 milliGRAM(s) IV Intermittent every 12 hours  lactulose Syrup 10 Gram(s) Oral two times a day  levETIRAcetam  IVPB 1000 milliGRAM(s) IV Intermittent every 12 hours  LORazepam   Injectable 1 milliGRAM(s) IV Push three times a day PRN  nystatin Powder 1 Application(s) Topical two times a day  OLANZapine 7.5 milliGRAM(s) Oral at bedtime  pantoprazole  Injectable 40 milliGRAM(s) IV Push daily  valproate sodium IVPB 1000 milliGRAM(s) IV Intermittent every 12 hours         Family history: No history of dementia, strokes, or seizures   FAMILY HISTORY:  No pertinent family history in first degree relatives    SOCIAL HISTORY -- No history of tobacco or alcohol use     Allergies    Topamax (Unknown)    Intolerances            Vital Signs Last 24 Hrs  T(C): 35.2 (12 Jun 2018 04:00), Max: 36.9 (11 Jun 2018 16:00)  T(F): 95.4 (12 Jun 2018 04:00), Max: 98.5 (11 Jun 2018 16:00)  HR: 69 (12 Jun 2018 08:00) (61 - 81)  BP: 127/69 (12 Jun 2018 08:00) (101/55 - 144/73)  BP(mean): 82 (12 Jun 2018 08:00) (63 - 101)  RR: 20 (12 Jun 2018 08:00) (11 - 22)  SpO2: 96% (12 Jun 2018 08:00) (91% - 100%)        On Neurological Examination:    Mental Status - Patient is, awake,  Speech -non verbal                              Cranial Nerves - Extraocular muscle intact  WINSTON Facial symmetry Tongue midline, CnV1to V3 intact gross hearing intact      Motor Exam - moves all extremeities    Sensory    withdraw     GENERAL Exam:     Nontoxic , No Acute Distress   	  HEENT:  normocephalic, atraumatic  		  LUNGS:	Clear bilaterally  No Wheeze      VASCULAR: no carotid brui  	  HEART:	 Normal S1S2   No murmur RRR        	  MUSCULOSKELETAL: Normal Range of Motion  	   SKIN:      Normal   No Ecchymosis               LABS:  CBC Full  -  ( 12 Jun 2018 03:25 )  WBC Count : 5.23 K/uL  Hemoglobin : 11.1 g/dL  Hematocrit : 35.4 %  Platelet Count - Automated : 104 K/uL  Mean Cell Volume : 106.3 fL  Mean Cell Hemoglobin : 33.3 pg  Mean Cell Hemoglobin Concentration : 31.4 %  Auto Neutrophil # : 2.18 K/uL  Auto Lymphocyte # : 2.05 K/uL  Auto Monocyte # : 0.61 K/uL  Auto Eosinophil # : 0.25 K/uL  Auto Basophil # : 0.02 K/uL  Auto Neutrophil % : 41.6 %  Auto Lymphocyte % : 39.2 %  Auto Monocyte % : 11.7 %  Auto Eosinophil % : 4.8 %  Auto Basophil % : 0.4 %      06-12    147<H>  |  112<H>  |  3<L>  ----------------------------<  96  3.6   |  25  |  0.54    Ca    7.4<L>      12 Jun 2018 03:25  Phos  2.3     06-11  Mg     1.6     06-11    TPro  6.0  /  Alb  2.6<L>  /  TBili  < 0.2<L>  /  DBili  x   /  AST  35<H>  /  ALT  27  /  AlkPhos  58  06-12    Hemoglobin A1C:     LIVER FUNCTIONS - ( 12 Jun 2018 03:25 )  Alb: 2.6 g/dL / Pro: 6.0 g/dL / ALK PHOS: 58 u/L / ALT: 27 u/L / AST: 35 u/L / GGT: x           Vitamin B12         RADIOLOGY    EKG      IMPRESSION  This is a  year old           schoenberg

## 2018-06-12 NOTE — PROGRESS NOTE ADULT - SUBJECTIVE AND OBJECTIVE BOX
INTERVAL HPI/OVERNIGHT EVENTS: Sleepy   Vital Signs Last 24 Hrs  T(C): 33.4 (12 Jun 2018 20:00), Max: 35.4 (12 Jun 2018 08:00)  T(F): 92.2 (12 Jun 2018 20:00), Max: 95.8 (12 Jun 2018 08:00)  HR: 64 (12 Jun 2018 20:00) (56 - 71)  BP: 134/98 (12 Jun 2018 20:00) (101/55 - 144/73)  BP(mean): 107 (12 Jun 2018 20:00) (65 - 107)  RR: 17 (12 Jun 2018 20:00) (10 - 21)  SpO2: 96% (12 Jun 2018 20:00) (93% - 99%)  I&O's Summary    11 Jun 2018 07:01  -  12 Jun 2018 07:00  --------------------------------------------------------  IN: 2730 mL / OUT: 0 mL / NET: 2730 mL    12 Jun 2018 07:01  -  12 Jun 2018 21:27  --------------------------------------------------------  IN: 1300 mL / OUT: 0 mL / NET: 1300 mL      MEDICATIONS  (STANDING):  AQUAPHOR (petrolatum Ointment) 1 Application(s) Topical two times a day  artificial  tears Solution 2 Drop(s) Both EYES three times a day  aspirin enteric coated 81 milliGRAM(s) Oral daily  benztropine 0.5 milliGRAM(s) Oral two times a day  buDESOnide   0.5 milliGRAM(s) Respule 0.5 milliGRAM(s) Inhalation daily  clotrimazole 1% Cream 1 Application(s) Topical two times a day  dextrose 5% + sodium chloride 0.9%. 1000 milliLiter(s) (100 mL/Hr) IV Continuous <Continuous>  enoxaparin Injectable 40 milliGRAM(s) SubCutaneous daily  fluticasone propionate 50 MICROgram(s)/spray Nasal Spray 1 Spray(s) Both Nostrils two times a day  lacosamide IVPB 300 milliGRAM(s) IV Intermittent every 12 hours  lactulose Syrup 10 Gram(s) Oral two times a day  levETIRAcetam  IVPB 1000 milliGRAM(s) IV Intermittent every 12 hours  nystatin Powder 1 Application(s) Topical two times a day  OLANZapine 7.5 milliGRAM(s) Oral at bedtime  pantoprazole  Injectable 40 milliGRAM(s) IV Push daily  valproate sodium IVPB 1000 milliGRAM(s) IV Intermittent every 12 hours    MEDICATIONS  (PRN):  ALBUTerol/ipratropium for Nebulization 3 milliLiter(s) Nebulizer every 6 hours PRN Shortness of Breath and/or Wheezing  LORazepam   Injectable 1 milliGRAM(s) IV Push three times a day PRN seizure    LABS:                        11.1   5.23  )-----------( 104      ( 12 Jun 2018 03:25 )             35.4     06-12    147<H>  |  112<H>  |  3<L>  ----------------------------<  96  3.6   |  25  |  0.54    Ca    7.4<L>      12 Jun 2018 03:25  Phos  2.3     06-11  Mg     1.6     06-11    TPro  6.0  /  Alb  2.6<L>  /  TBili  < 0.2<L>  /  DBili  x   /  AST  35<H>  /  ALT  27  /  AlkPhos  58  06-12        CAPILLARY BLOOD GLUCOSE                  Consultant(s) Notes Reviewed:  [x ] YES  [ ] NO    PHYSICAL EXAM:  GENERAL: NAD, well-groomed, well-developed ,not in any distress ,  HEAD:  Atraumatic, Normocephalic  NECK: Supple, No JVD, Normal thyroid  NERVOUS SYSTEM:  sleepy  CHEST/LUNG: Good air entry bilateral with no  rales, rhonchi, wheezing, or rubs  HEART: Regular rate and rhythm; No murmurs, rubs, or gallops  ABDOMEN: Soft, Nontender, Nondistended; Bowel sounds present  EXTREMITIES:  2+ Peripheral Pulses, No clubbing, cyanosis, or edema      Care Discussed with Consultants/Other Providers [ x] YES  [ ] NO

## 2018-06-12 NOTE — PROGRESS NOTE ADULT - SUBJECTIVE AND OBJECTIVE BOX
MICU Progress Note    SAWYER NAYANA  53yFemale    Patient is a 53y old  Female who presents with a chief complaint of seizures (07 Jun 2018 15:19)    INTERVAL HPI/OVERNIGHT EVENTS:  -  -14-point ROS otherwise negative.     T(C): 35.2 (06-12-18 @ 04:00), Max: 36.9 (06-11-18 @ 16:00)  HR: 61 (06-12-18 @ 07:00) (61 - 81)  BP: 120/70 (06-12-18 @ 07:00) (101/55 - 144/73)  RR: 20 (06-12-18 @ 07:00) (11 - 23)  SpO2: 96% (06-12-18 @ 07:00) (91% - 100%)  Wt(kg): --Vital Signs Last 24 Hrs  T(C): 35.2 (12 Jun 2018 04:00), Max: 36.9 (11 Jun 2018 16:00)  T(F): 95.4 (12 Jun 2018 04:00), Max: 98.5 (11 Jun 2018 16:00)  HR: 61 (12 Jun 2018 07:00) (61 - 81)  BP: 120/70 (12 Jun 2018 07:00) (101/55 - 144/73)  BP(mean): 81 (12 Jun 2018 07:00) (60 - 101)  RR: 20 (12 Jun 2018 07:00) (11 - 23)  SpO2: 96% (12 Jun 2018 07:00) (91% - 100%)    06-11-18 @ 07:01  -  06-12-18 @ 07:00  --------------------------------------------------------  IN: 2730 mL / OUT: 0 mL / NET: 2730 mL    PHYSICAL EXAM:  GENERAL: NAD, well-groomed, well-developed  HEAD:  Atraumatic, Normocephalic  EYES: EOMI, PERRLA, conjunctiva and sclera clear  ENMT: No tonsillar erythema, exudates, or enlargement; Moist mucous membranes, Good dentition.  NECK: Supple, No JVD, Normal thyroid  NERVOUS SYSTEM:  Alert & Oriented X3, Motor Strength 5/5 B/L upper and lower extremities.  CHEST/LUNG: CTABL; No rales, rhonchi, wheezing, or rubs  HEART: Regular rate and rhythm; No murmurs, rubs, or gallops  ABDOMEN: Soft, Nontender, Nondistended; Bowel sounds present  EXTREMITIES:  2+ Peripheral Pulses, No clubbing, cyanosis, or edema  LYMPH: No lymphadenopathy noted  SKIN: No rashes or lesions    Consultant(s) Notes Reviewed:  [x ] YES  [ ] NO  Care Discussed with Consultants/Other Providers [ x] YES  [ ] NO    LABS:                        11.1   5.23  )-----------( 104      ( 12 Jun 2018 03:25 )             35.4     06-12    147<H>  |  112<H>  |  3<L>  ----------------------------<  96  3.6   |  25  |  0.54    Ca    7.4<L>      12 Jun 2018 03:25  Phos  2.3     06-11  Mg     1.6     06-11    TPro  6.0  /  Alb  2.6<L>  /  TBili  < 0.2<L>  /  DBili  x   /  AST  35<H>  /  ALT  27  /  AlkPhos  58  06-12    RADIOLOGY & ADDITIONAL TESTS:  No new imaging.     HEALTH ISSUES - PROBLEM Dx:  Abnormal EKG: Abnormal EKG  Hypothermia: Hypothermia  Constipation, unspecified constipation type: Constipation, unspecified constipation type  Mood disturbance: Mood disturbance  Prophylactic measure: Prophylactic measure  Intermittent asthma without complication, unspecified asthma severity: Intermittent asthma without complication, unspecified asthma severity  Cerebral palsy, unspecified type: Cerebral palsy, unspecified type  Seizure: Seizure    ALBUTerol/ipratropium for Nebulization 3 milliLiter(s) Nebulizer every 6 hours PRN  AQUAPHOR (petrolatum Ointment) 1 Application(s) Topical two times a day  artificial  tears Solution 2 Drop(s) Both EYES three times a day  aspirin enteric coated 81 milliGRAM(s) Oral daily  benztropine 0.5 milliGRAM(s) Oral two times a day  buDESOnide   0.5 milliGRAM(s) Respule 0.5 milliGRAM(s) Inhalation daily  clotrimazole 1% Cream 1 Application(s) Topical two times a day  dextrose 5% + sodium chloride 0.9%. 1000 milliLiter(s) IV Continuous <Continuous>  enoxaparin Injectable 40 milliGRAM(s) SubCutaneous daily  fluticasone propionate 50 MICROgram(s)/spray Nasal Spray 1 Spray(s) Both Nostrils two times a day  fosphenytoin IVPB 100 milliGRAM(s) PE IV Intermittent three times a day  lacosamide IVPB 300 milliGRAM(s) IV Intermittent every 12 hours  lactulose Syrup 10 Gram(s) Oral two times a day  levETIRAcetam  IVPB 1500 milliGRAM(s) IV Intermittent every 12 hours  LORazepam   Injectable 1 milliGRAM(s) IV Push three times a day PRN  nystatin Powder 1 Application(s) Topical two times a day  OLANZapine 7.5 milliGRAM(s) Oral at bedtime  pantoprazole  Injectable 40 milliGRAM(s) IV Push daily  valproate sodium IVPB 1000 milliGRAM(s) IV Intermittent every 12 hours MICU Progress Note    NAYANA JACQUES  53yFemale    Patient is a 53y old  Female who presents with a chief complaint of seizures (07 Jun 2018 15:19)    INTERVAL HPI/OVERNIGHT EVENTS:  - Bradycardic to 30s, nonsustained overnight. Spontaneous resolution to 60s within seconds. Tele strip shows occasional p-waves with no R-waves following with concern for 2nd degree type II block. EP called.   - also hypothermic, which is known side effect of anti-seizure meds and baseline for patient.   - unable to elicit ROS due to baseline mental status (non-verbal).    T(C): 35.2 (06-12-18 @ 04:00), Max: 36.9 (06-11-18 @ 16:00)  HR: 61 (06-12-18 @ 07:00) (61 - 81)  BP: 120/70 (06-12-18 @ 07:00) (101/55 - 144/73)  RR: 20 (06-12-18 @ 07:00) (11 - 23)  SpO2: 96% (06-12-18 @ 07:00) (91% - 100%)  Wt(kg): --Vital Signs Last 24 Hrs  T(C): 35.2 (12 Jun 2018 04:00), Max: 36.9 (11 Jun 2018 16:00)  T(F): 95.4 (12 Jun 2018 04:00), Max: 98.5 (11 Jun 2018 16:00)  HR: 61 (12 Jun 2018 07:00) (61 - 81)  BP: 120/70 (12 Jun 2018 07:00) (101/55 - 144/73)  BP(mean): 81 (12 Jun 2018 07:00) (60 - 101)  RR: 20 (12 Jun 2018 07:00) (11 - 23)  SpO2: 96% (12 Jun 2018 07:00) (91% - 100%)    06-11-18 @ 07:01  -  06-12-18 @ 07:00  --------------------------------------------------------  IN: 2730 mL / OUT: 0 mL / NET: 2730 mL    PHYSICAL EXAM:  GENERAL: NAD.  HEAD:  EEG apparatus intact.   EYES: EOMI, PERRL, conjunctiva and sclera clear  NECK: Supple, No JVD.  NERVOUS SYSTEM:  Alert & Oriented X0, Moving all 4 extremities. Awakens to voice but appeared lethargic this AM.   CHEST/LUNG: CTABL.  HEART: Regular rate and rhythm; No murmurs, rubs, or gallops.  ABDOMEN: Soft, Nontender, Nondistended; Bowel sounds present.  EXTREMITIES: Warm extremities, bilateral contractions in lower extremities.     Consultant(s) Notes Reviewed:  [x ] YES  [ ] NO  Care Discussed with Consultants/Other Providers [ x] YES  [ ] NO    LABS:                        11.1   5.23  )-----------( 104      ( 12 Jun 2018 03:25 )             35.4     06-12    147<H>  |  112<H>  |  3<L>  ----------------------------<  96  3.6   |  25  |  0.54    Ca    7.4<L>      12 Jun 2018 03:25  Phos  2.3     06-11  Mg     1.6     06-11    TPro  6.0  /  Alb  2.6<L>  /  TBili  < 0.2<L>  /  DBili  x   /  AST  35<H>  /  ALT  27  /  AlkPhos  58  06-12    RADIOLOGY & ADDITIONAL TESTS:  No new imaging.     HEALTH ISSUES - PROBLEM Dx:  Abnormal EKG: Abnormal EKG  Hypothermia: Hypothermia  Constipation, unspecified constipation type: Constipation, unspecified constipation type  Mood disturbance: Mood disturbance  Prophylactic measure: Prophylactic measure  Intermittent asthma without complication, unspecified asthma severity: Intermittent asthma without complication, unspecified asthma severity  Cerebral palsy, unspecified type: Cerebral palsy, unspecified type  Seizure: Seizure    ALBUTerol/ipratropium for Nebulization 3 milliLiter(s) Nebulizer every 6 hours PRN  AQUAPHOR (petrolatum Ointment) 1 Application(s) Topical two times a day  artificial  tears Solution 2 Drop(s) Both EYES three times a day  aspirin enteric coated 81 milliGRAM(s) Oral daily  benztropine 0.5 milliGRAM(s) Oral two times a day  buDESOnide   0.5 milliGRAM(s) Respule 0.5 milliGRAM(s) Inhalation daily  clotrimazole 1% Cream 1 Application(s) Topical two times a day  dextrose 5% + sodium chloride 0.9%. 1000 milliLiter(s) IV Continuous <Continuous>  enoxaparin Injectable 40 milliGRAM(s) SubCutaneous daily  fluticasone propionate 50 MICROgram(s)/spray Nasal Spray 1 Spray(s) Both Nostrils two times a day  fosphenytoin IVPB 100 milliGRAM(s) PE IV Intermittent three times a day  lacosamide IVPB 300 milliGRAM(s) IV Intermittent every 12 hours  lactulose Syrup 10 Gram(s) Oral two times a day  levETIRAcetam  IVPB 1500 milliGRAM(s) IV Intermittent every 12 hours  LORazepam   Injectable 1 milliGRAM(s) IV Push three times a day PRN  nystatin Powder 1 Application(s) Topical two times a day  OLANZapine 7.5 milliGRAM(s) Oral at bedtime  pantoprazole  Injectable 40 milliGRAM(s) IV Push daily  valproate sodium IVPB 1000 milliGRAM(s) IV Intermittent every 12 hours

## 2018-06-12 NOTE — CONSULT NOTE ADULT - ASSESSMENT
52 y/o F w/ cerebral palsy and severe intellectual disability, intractable seizures  presenting w/ multiple seizures within 24 hours As per neurology, increase in seizure episodes likely due to  recent medication changes of Keppra 1000 mg BID to Keppra 750 mg BID (changed as an outpatient). The telemetry monitor demonstrated a slowing of the sinus node (prolongation of the P-P interval) most likely due to vagal hypertonia ?sleep apnea. During these episodes she was hemodynamically stable and appeared to be asymptomatic. There is no indication for a pacemaker at this time.    Plan:     Continue telemetry     Keep K+> 4.0 and Mg + 2.0     Avoid AV zaida blocking agents.     Will follow with you.

## 2018-06-12 NOTE — PROGRESS NOTE ADULT - ASSESSMENT
This is a female with status.  Seizure have mostly stopped. Now with cardiac issues     decrease vimpat and dilantin     continue EEG

## 2018-06-12 NOTE — EEG REPORT - NS EEG TEXT BOX
Study Date: 6/11/2018	to 6/12/2018	    Technical Information:					  On Instrument: -  Placement and Labeling of Electrodes:  The EEG was performed utilizing 20 channels referential EEG connections (coronal over temporal over parasagittal montage) using all standard 10-20 electrode placements with EKG.  Recording was at a sampling rate of 256 samples per second per channel.  Time synchronized digital video recording was done simultaneously with EEG recording.  A low light infrared camera was used for low light recording.  Valerio and seizure detection algorithms were utilized.    History:  THIS IS A ROUTINE EEG  52 YO FEMALE  MICU-13  CONVULSIONS  HX- CEREBRAL PALSY, SEIZURES, NON-VERBAL  PT ON KEPPRA  PT VERY AGITATED/DIFFICULT  PT ON NASAL O2  PT ON CARDIAC MONITOR  CONCERN FOR SEIZURE    Medication	  DEPACON	  FOSPHENYTOIN	  HHEPARIN	  Keppra	    Study Interpretation:    FINDINGS:  The background was continuous, spontaneously variable and reactive. There was no PDR. The background consisted of continuous diffuse polymorphic and rhythmic delta and theta frequencies.     Sleep Background:  Drowsiness and Stage II sleep transients were not recorded.    Other Non-Epileptiform Findings:  None were present.    Interictal Epileptiform Activity:   None    Events:     8 subclinical seizures captured between 16:45 and 07:35, with non-localizable, diffuse onset    EEG Description: Burst of alpha/beta activity diffusely over both hemispheres, progressing to admixture of semirhythmic alpha/theta activity, Duration ~10-15 sec    Clinical Description: no associated clinical signs    Activation Procedures:   Hyperventilation was not performed.    Photic stimulation was not performed.    Artifacts:  Intermittent myogenic and movement artifacts were noted.    ECG:  The heart rate on single channel ECG was predominantly between 60-70 BPM.    EEG Summary/Classification:  Abnormal EEG in awake state  -Total of 8 seizures captured: subclinical with non-localizable, diffuse onset  -Moderate background slowing     EEG Impression/Clinical Correlate:  1.	There were 8 subclinical seizures captured over the span of ~16hrs: the last seizure was recorded at 07:35 on 6/12/18   2.	There is evidence of moderate nonspecific diffuse or multifocal cerebral dysfunction    Pedro Morrison MD  Attending Physician

## 2018-06-12 NOTE — PROGRESS NOTE ADULT - SUBJECTIVE AND OBJECTIVE BOX
pt appears comfortable no signs of acute distress, non verbal   currently undergoing EEG              MEDICATIONS  (STANDING):  AQUAPHOR (petrolatum Ointment) 1 Application(s) Topical two times a day  artificial  tears Solution 2 Drop(s) Both EYES three times a day  aspirin enteric coated 81 milliGRAM(s) Oral daily  benztropine 0.5 milliGRAM(s) Oral two times a day  buDESOnide   0.5 milliGRAM(s) Respule 0.5 milliGRAM(s) Inhalation daily  clotrimazole 1% Cream 1 Application(s) Topical two times a day  dextrose 5% + sodium chloride 0.9%. 1000 milliLiter(s) (100 mL/Hr) IV Continuous <Continuous>  enoxaparin Injectable 40 milliGRAM(s) SubCutaneous daily  fluticasone propionate 50 MICROgram(s)/spray Nasal Spray 1 Spray(s) Both Nostrils two times a day  fosphenytoin IVPB 100 milliGRAM(s) PE IV Intermittent three times a day  fosphenytoin IVPB 100 milliGRAM(s) PE IV Intermittent once  lacosamide IVPB 300 milliGRAM(s) IV Intermittent every 12 hours  lactulose Syrup 10 Gram(s) Oral two times a day  levETIRAcetam  IVPB 1000 milliGRAM(s) IV Intermittent every 12 hours  nystatin Powder 1 Application(s) Topical two times a day  OLANZapine 7.5 milliGRAM(s) Oral at bedtime  pantoprazole  Injectable 40 milliGRAM(s) IV Push daily  valproate sodium IVPB 1000 milliGRAM(s) IV Intermittent every 12 hours    MEDICATIONS  (PRN):  ALBUTerol/ipratropium for Nebulization 3 milliLiter(s) Nebulizer every 6 hours PRN Shortness of Breath and/or Wheezing  LORazepam   Injectable 1 milliGRAM(s) IV Push three times a day PRN seizure      LABS:                        11.1   5.23  )-----------( 104      ( 12 Jun 2018 03:25 )             35.4     147<H>  |  112<H>  |  3<L>  ----------------------------<  96  3.6   |  25  |  0.54    Ca    7.4<L>      12 Jun 2018 03:25  Phos  2.3     06-11  Mg     1.6     06-11    TPro  6.0  /  Alb  2.6<L>  /  TBili  < 0.2<L>  /  DBili  x   /  AST  35<H>  /  ALT  27  /  AlkPhos  58  06-12    Creatinine Trend: 0.54<--, 0.61<--, 0.58<--, 0.57<--, 0.71<--, 0.77<--       PHYSICAL EXAM  Vital Signs Last 24 Hrs  T(C): 35.4 (12 Jun 2018 08:00), Max: 36.9 (11 Jun 2018 16:00)  T(F): 95.8 (12 Jun 2018 08:00), Max: 98.5 (11 Jun 2018 16:00)  HR: 62 (12 Jun 2018 10:24) (61 - 72)  BP: 124/75 (12 Jun 2018 10:00) (101/55 - 144/73)  BP(mean): 85 (12 Jun 2018 10:00) (65 - 102)  RR: 17 (12 Jun 2018 10:00) (11 - 22)  SpO2: 98% (12 Jun 2018 10:24) (91% - 99%)    Cardiovascular: Normal S1 S2, RRR  No JVD, 1/6 DIAZ murmur,  Respiratory: Lungs clear to auscultation, normal effort 	  Gastrointestinal:  Soft, Non-tender, + BS	  Extremities no edema, cyanosis, clubbing B/L LE's     TELEMETRY: SR, hx NSVT, APCS, BLocked APCS, Wenkebach on 6/12  	    RADIOLOGY:     < from: Xray Chest 1 View- PORTABLE-Urgent (06.06.18 @ 18:48) >  IMPRESSION:  Chin overlie and obscures portion of lung apices.    Uniform hazy opacity in peripheral lower left hemithorax obscuring   lateral left hemidiaphragm CP angle and left heart border related to   prominent epicardial fat as demonstrated on chestCT from 5/9/2018.     Sharp right CP angle. Clear remaining visualized lungs. No pneumothorax.    Heart size and mediastinal width inaccurately assessed on this projection.    Spinal degenerative changes again noted.    < end of copied text >    < from: CT Head No Cont (06.07.18 @ 02:40) >  Impression:     No acute intracranial hemorrhage, large cortical infarct or mass effect.   No significant interval change since 5/8/2018. If clinically indicated,   short-term follow-up or MRI may be obtained for further evaluation.    < end of copied text >  	  EEG   EEG Summary/Classification:  Abnormal EEG in awake state   - 	Total of 18 seizures captured: 9 electro-clinical seizures with right hemispheric onset (clinical semiology unclear), 3 electrographic seizures with right hemispheric onset and 6 electrographic seizures with left hemispheric onset.  -          Moderate background slowing     EEG Impression/Clinical Correlate:  1.	There were 18 electroclinical and subclinical seizures captured over the span of ~3hrs: 12 from the right hemisphere and 6 with onset in the left hemisphere.   2.	There is evidence of moderate nonspecific diffuse or multifocal cerebral dysfunction      ASSESSMENT/PLAN: 	52 y/o female with PmHx of cerebral palsy, intellectual disability, seizures, non-verbal at baseline, presented to [facility] ED after 4 seizures in less than 24 hours.  Cardiology consulted for abnormal EKG     --The patient has ruled out for acute coronary syndrome with negative serial cardiac enzymes  --EKG with NSR narrow QRS without acute ischemia   --Check TTE to assess LV function   --orthostatics negative  --tele events noted, avoid AVN blockers    -- F/u neuro  --[facility]     Madiha Domínguez PA-C

## 2018-06-13 LAB
ALBUMIN SERPL ELPH-MCNC: 2.3 G/DL — LOW (ref 3.3–5)
ALP SERPL-CCNC: 58 U/L — SIGNIFICANT CHANGE UP (ref 40–120)
ALT FLD-CCNC: 27 U/L — SIGNIFICANT CHANGE UP (ref 4–33)
AST SERPL-CCNC: 44 U/L — HIGH (ref 4–32)
BILIRUB SERPL-MCNC: < 0.2 MG/DL — LOW (ref 0.2–1.2)
BUN SERPL-MCNC: 2 MG/DL — LOW (ref 7–23)
CALCIUM SERPL-MCNC: 7.1 MG/DL — LOW (ref 8.4–10.5)
CHLORIDE SERPL-SCNC: 111 MMOL/L — HIGH (ref 98–107)
CO2 SERPL-SCNC: 23 MMOL/L — SIGNIFICANT CHANGE UP (ref 22–31)
CREAT SERPL-MCNC: 0.47 MG/DL — LOW (ref 0.5–1.3)
GLUCOSE BLDC GLUCOMTR-MCNC: 101 MG/DL — HIGH (ref 70–99)
GLUCOSE SERPL-MCNC: 94 MG/DL — SIGNIFICANT CHANGE UP (ref 70–99)
HCT VFR BLD CALC: 34.4 % — LOW (ref 34.5–45)
HGB BLD-MCNC: 10.7 G/DL — LOW (ref 11.5–15.5)
LEVETIRACETAM SERPL-MCNC: 62.3 MCG/ML — HIGH (ref 12–46)
MAGNESIUM SERPL-MCNC: 1.4 MG/DL — LOW (ref 1.6–2.6)
MCHC RBC-ENTMCNC: 31.1 % — LOW (ref 32–36)
MCHC RBC-ENTMCNC: 32.7 PG — SIGNIFICANT CHANGE UP (ref 27–34)
MCV RBC AUTO: 105.2 FL — HIGH (ref 80–100)
NRBC # FLD: 0.02 — SIGNIFICANT CHANGE UP
PHOSPHATE SERPL-MCNC: 2.6 MG/DL — SIGNIFICANT CHANGE UP (ref 2.5–4.5)
PLATELET # BLD AUTO: 104 K/UL — LOW (ref 150–400)
PMV BLD: 10.5 FL — SIGNIFICANT CHANGE UP (ref 7–13)
POTASSIUM SERPL-MCNC: 4 MMOL/L — SIGNIFICANT CHANGE UP (ref 3.5–5.3)
POTASSIUM SERPL-SCNC: 4 MMOL/L — SIGNIFICANT CHANGE UP (ref 3.5–5.3)
PROT SERPL-MCNC: 5.7 G/DL — LOW (ref 6–8.3)
RBC # BLD: 3.27 M/UL — LOW (ref 3.8–5.2)
RBC # FLD: 14.2 % — SIGNIFICANT CHANGE UP (ref 10.3–14.5)
SODIUM SERPL-SCNC: 145 MMOL/L — SIGNIFICANT CHANGE UP (ref 135–145)
WBC # BLD: 5.01 K/UL — SIGNIFICANT CHANGE UP (ref 3.8–10.5)
WBC # FLD AUTO: 5.01 K/UL — SIGNIFICANT CHANGE UP (ref 3.8–10.5)

## 2018-06-13 PROCEDURE — 99291 CRITICAL CARE FIRST HOUR: CPT

## 2018-06-13 PROCEDURE — 95951: CPT | Mod: 26

## 2018-06-13 PROCEDURE — 99233 SBSQ HOSP IP/OBS HIGH 50: CPT

## 2018-06-13 RX ORDER — PANTOPRAZOLE SODIUM 20 MG/1
40 TABLET, DELAYED RELEASE ORAL
Refills: 0 | Status: DISCONTINUED | OUTPATIENT
Start: 2018-06-13 | End: 2018-06-18

## 2018-06-13 RX ORDER — MAGNESIUM SULFATE 500 MG/ML
1 VIAL (ML) INJECTION ONCE
Refills: 0 | Status: COMPLETED | OUTPATIENT
Start: 2018-06-13 | End: 2018-06-13

## 2018-06-13 RX ADMIN — Medication 1 APPLICATION(S): at 05:17

## 2018-06-13 RX ADMIN — Medication 0.5 MILLIGRAM(S): at 19:24

## 2018-06-13 RX ADMIN — SODIUM CHLORIDE 100 MILLILITER(S): 9 INJECTION, SOLUTION INTRAVENOUS at 19:16

## 2018-06-13 RX ADMIN — Medication 30 MILLIGRAM(S): at 18:29

## 2018-06-13 RX ADMIN — Medication 30 MILLIGRAM(S): at 06:32

## 2018-06-13 RX ADMIN — Medication 2 DROP(S): at 14:48

## 2018-06-13 RX ADMIN — NYSTATIN CREAM 1 APPLICATION(S): 100000 CREAM TOPICAL at 18:29

## 2018-06-13 RX ADMIN — NYSTATIN CREAM 1 APPLICATION(S): 100000 CREAM TOPICAL at 05:17

## 2018-06-13 RX ADMIN — Medication 3 MILLILITER(S): at 08:30

## 2018-06-13 RX ADMIN — PANTOPRAZOLE SODIUM 40 MILLIGRAM(S): 20 TABLET, DELAYED RELEASE ORAL at 12:17

## 2018-06-13 RX ADMIN — OLANZAPINE 7.5 MILLIGRAM(S): 15 TABLET, FILM COATED ORAL at 21:09

## 2018-06-13 RX ADMIN — FOSPHENYTOIN 104 MILLIGRAM(S) PE: 50 INJECTION INTRAMUSCULAR; INTRAVENOUS at 05:45

## 2018-06-13 RX ADMIN — Medication 2 DROP(S): at 21:09

## 2018-06-13 RX ADMIN — Medication 1 SPRAY(S): at 19:24

## 2018-06-13 RX ADMIN — LEVETIRACETAM 400 MILLIGRAM(S): 250 TABLET, FILM COATED ORAL at 12:17

## 2018-06-13 RX ADMIN — Medication 1 APPLICATION(S): at 18:35

## 2018-06-13 RX ADMIN — Medication 1 SPRAY(S): at 05:17

## 2018-06-13 RX ADMIN — LACOSAMIDE 160 MILLIGRAM(S): 50 TABLET ORAL at 16:22

## 2018-06-13 RX ADMIN — Medication 0.5 MILLIGRAM(S): at 08:30

## 2018-06-13 RX ADMIN — Medication 2 DROP(S): at 05:17

## 2018-06-13 RX ADMIN — SODIUM CHLORIDE 100 MILLILITER(S): 9 INJECTION, SOLUTION INTRAVENOUS at 18:29

## 2018-06-13 RX ADMIN — LACOSAMIDE 160 MILLIGRAM(S): 50 TABLET ORAL at 01:21

## 2018-06-13 RX ADMIN — ENOXAPARIN SODIUM 40 MILLIGRAM(S): 100 INJECTION SUBCUTANEOUS at 12:17

## 2018-06-13 RX ADMIN — Medication 100 GRAM(S): at 04:46

## 2018-06-13 NOTE — PROGRESS NOTE ADULT - ASSESSMENT
52 y/o F w/ cerebral palsy and severe intellectual disability, intractable seizures  presenting w/ multiple seizures within 24 hours As per neurology, increase in seizure episodes likely due to  recent medication changes of Keppra 1000 mg BID to Keppra 750 mg BID (changed as an outpatient). The telemetry monitor demonstrated a slowing of the sinus node (prolongation of the P-P interval) most likely due to vagal hypertonia likely related to  ?sleep apnea/ hypothermia/sedation.  During these episodes she was hemodynamically stable and appeared to be asymptomatic. Today, off Ativan, she is more alert and there are no Wenckebach episodes.  However, she has had NSVT on telemetry.    Plan:     Continue telemetry     Get echocardiogram when EEG completed.     Keep K+> 4.0 and Mg + 2.0     Avoid AV zaida blocking agents.     Avoid sedation if possible     Hypothermia protocol     Will follow with you.

## 2018-06-13 NOTE — PROGRESS NOTE ADULT - SUBJECTIVE AND OBJECTIVE BOX
Interval Events:  Got ativan one dose overnight for possible seizure acitivity; will decrease Keppra to 1000 mg BID due to decreased PO intake due to sedation; speak with Neurology about seizure medication regimen adjustments (fosphenytoin titrated down to 100 BID from TOD today; to be 100 once tomorrow); Main on overnight tele strip appears to be 2nd degree heart block on EKG, call EP today --> per EP, not heart block; prolonging p-to-p interval with missing R consistent with increased vagal tone (NTD, will keep on Vinpat). EEG still with 8 subclinical seizures in last 16 hours. NGT placement attempted - unsucessful as patient becomes very agitated.     REVIEW OF SYSTEMS:  [ ] All other systems negative  [X ] Unable to assess ROS because nonverbal baseline       OBJECTIVE:  ICU Vital Signs Last 24 Hrs  T(C): 37.5 (13 Jun 2018 04:00), Max: 37.5 (13 Jun 2018 04:00)  T(F): 99.5 (13 Jun 2018 04:00), Max: 99.5 (13 Jun 2018 04:00)  HR: 105 (13 Jun 2018 07:00) (56 - 105)  BP: 144/89 (13 Jun 2018 07:00) (102/52 - 144/89)  BP(mean): 99 (13 Jun 2018 07:00) (62 - 107)  ABP: --  ABP(mean): --  RR: 18 (13 Jun 2018 07:00) (10 - 27)  SpO2: 96% (13 Jun 2018 07:00) (92% - 99%)        06-12 @ 07:01  -  06-13 @ 07:00  --------------------------------------------------------  IN: 2550 mL / OUT: 0 mL / NET: 2550 mL      CAPILLARY BLOOD GLUCOSE          PHYSICAL EXAM:  General:   HEENT:   Lymph Nodes:  Neck:   Respiratory:   Cardiovascular:   Abdomen:   Extremities:   Skin:   Neurological:  Psychiatry:    LINES:    HOSPITAL MEDICATIONS:  aspirin enteric coated 81 milliGRAM(s) Oral daily  enoxaparin Injectable 40 milliGRAM(s) SubCutaneous daily          ALBUTerol/ipratropium for Nebulization 3 milliLiter(s) Nebulizer every 6 hours PRN  buDESOnide   0.5 milliGRAM(s) Respule 0.5 milliGRAM(s) Inhalation daily    benztropine 0.5 milliGRAM(s) Oral two times a day  lacosamide IVPB 300 milliGRAM(s) IV Intermittent every 12 hours  levETIRAcetam  IVPB 1000 milliGRAM(s) IV Intermittent every 12 hours  LORazepam   Injectable 1 milliGRAM(s) IV Push three times a day PRN  OLANZapine 7.5 milliGRAM(s) Oral at bedtime  valproate sodium IVPB 1000 milliGRAM(s) IV Intermittent every 12 hours    lactulose Syrup 10 Gram(s) Oral two times a day  pantoprazole  Injectable 40 milliGRAM(s) IV Push daily        dextrose 5% + sodium chloride 0.9%. 1000 milliLiter(s) IV Continuous <Continuous>      AQUAPHOR (petrolatum Ointment) 1 Application(s) Topical two times a day  artificial  tears Solution 2 Drop(s) Both EYES three times a day  clotrimazole 1% Cream 1 Application(s) Topical two times a day  fluticasone propionate 50 MICROgram(s)/spray Nasal Spray 1 Spray(s) Both Nostrils two times a day  nystatin Powder 1 Application(s) Topical two times a day        LABS:                        10.7   5.01  )-----------( 104      ( 13 Jun 2018 02:30 )             34.4     Hgb Trend: 10.7<--, 11.1<--, 10.7<--, 12.3<--, 10.7<--  06-13    145  |  111<H>  |  2<L>  ----------------------------<  94  4.0   |  23  |  0.47<L>    Ca    7.1<L>      13 Jun 2018 02:30  Phos  2.6     06-13  Mg     1.4     06-13    TPro  5.7<L>  /  Alb  2.3<L>  /  TBili  < 0.2<L>  /  DBili  x   /  AST  44<H>  /  ALT  27  /  AlkPhos  58  06-13    Creatinine Trend: 0.47<--, 0.54<--, 0.61<--, 0.58<--, 0.57<--, 0.71<--            MICROBIOLOGY:     RADIOLOGY:  [ ] Reviewed and interpreted by me    EKG: Interval Events:  Got ativan one dose overnight for possible seizure acitivity; will decrease Keppra to 1000 mg BID due to decreased PO intake due to sedation; speak with Neurology about seizure medication regimen adjustments (fosphenytoin titrated down to 100 BID from TOD today; to be 100 once tomorrow); Main on overnight tele strip appears to be 2nd degree heart block on EKG, call EP today --> per EP, not heart block; prolonging p-to-p interval with missing R consistent with increased vagal tone (NTD, will keep on Vinpat). EEG still with 8 subclinical seizures in last 16 hours. NGT placement attempted - unsucessful as patient becomes very agitated.     REVIEW OF SYSTEMS:  [ ] All other systems negative  [X ] Unable to assess ROS because nonverbal baseline       OBJECTIVE:  ICU Vital Signs Last 24 Hrs  T(C): 37.5 (13 Jun 2018 04:00), Max: 37.5 (13 Jun 2018 04:00)  T(F): 99.5 (13 Jun 2018 04:00), Max: 99.5 (13 Jun 2018 04:00)  HR: 105 (13 Jun 2018 07:00) (56 - 105)  BP: 144/89 (13 Jun 2018 07:00) (102/52 - 144/89)  BP(mean): 99 (13 Jun 2018 07:00) (62 - 107)    RR: 18 (13 Jun 2018 07:00) (10 - 27)  SpO2: 96% (13 Jun 2018 07:00) (92% - 99%)        06-12 @ 07:01  -  06-13 @ 07:00  --------------------------------------------------------  IN: 2550 mL / OUT: 0 mL / NET: 2550 mL      CAPILLARY BLOOD GLUCOSE      Gen: NAD, non-toxic  HENT: Normocephalic, atraumatic. External ears normal, no rhinorrhea, moist mucous membranes.   CV: RRR, no M/R/G  Resp: CTAB, non-labored  Abd: soft, non tender, non rigid, no guarding or rebound tenderness  Skin: dry, wwp       LINES:    HOSPITAL MEDICATIONS:  aspirin enteric coated 81 milliGRAM(s) Oral daily  enoxaparin Injectable 40 milliGRAM(s) SubCutaneous daily  ALBUTerol/ipratropium for Nebulization 3 milliLiter(s) Nebulizer every 6 hours PRN  buDESOnide   0.5 milliGRAM(s) Respule 0.5 milliGRAM(s) Inhalation daily  benztropine 0.5 milliGRAM(s) Oral two times a day  lacosamide IVPB 300 milliGRAM(s) IV Intermittent every 12 hours  levETIRAcetam  IVPB 1000 milliGRAM(s) IV Intermittent every 12 hours  LORazepam   Injectable 1 milliGRAM(s) IV Push three times a day PRN  OLANZapine 7.5 milliGRAM(s) Oral at bedtime  valproate sodium IVPB 1000 milliGRAM(s) IV Intermittent every 12 hours  lactulose Syrup 10 Gram(s) Oral two times a day  pantoprazole  Injectable 40 milliGRAM(s) IV Push daily  dextrose 5% + sodium chloride 0.9%. 1000 milliLiter(s) IV Continuous <Continuous>  AQUAPHOR (petrolatum Ointment) 1 Application(s) Topical two times a day  artificial  tears Solution 2 Drop(s) Both EYES three times a day  clotrimazole 1% Cream 1 Application(s) Topical two times a day  fluticasone propionate 50 MICROgram(s)/spray Nasal Spray 1 Spray(s) Both Nostrils two times a day  nystatin Powder 1 Application(s) Topical two times a day        LABS:                        10.7   5.01  )-----------( 104      ( 13 Jun 2018 02:30 )             34.4     Hgb Trend: 10.7<--, 11.1<--, 10.7<--, 12.3<--, 10.7<--  06-13    145  |  111<H>  |  2<L>  ----------------------------<  94  4.0   |  23  |  0.47<L>    Ca    7.1<L>      13 Jun 2018 02:30  Phos  2.6     06-13  Mg     1.4     06-13    TPro  5.7<L>  /  Alb  2.3<L>  /  TBili  < 0.2<L>  /  DBili  x   /  AST  44<H>  /  ALT  27  /  AlkPhos  58  06-13    Creatinine Trend: 0.47<--, 0.54<--, 0.61<--, 0.58<--, 0.57<--, 0.71<-- Interval Events:  Got ativan one dose overnight for possible seizure acitivity; will decrease Keppra to 1000 mg BID due to decreased PO intake due to sedation; speak with Neurology about seizure medication regimen adjustments (fosphenytoin titrated down to 100 BID from TOD today; to be 100 once tomorrow); Main on overnight tele strip appears to be 2nd degree heart block on EKG, call EP today --> per EP, not heart block; prolonging p-to-p interval with missing R consistent with increased vagal tone (NTD, will keep on Vinpat). EEG still with 8 subclinical seizures in last 16 hours. NGT placement attempted - unsucessful as patient becomes very agitated.   -will attempt to feed today, continue to cut back on sedative medications, d/c ativan    REVIEW OF SYSTEMS:  [ ] All other systems negative  [X ] Unable to assess ROS because nonverbal baseline       OBJECTIVE:  ICU Vital Signs Last 24 Hrs  T(C): 37.5 (13 Jun 2018 04:00), Max: 37.5 (13 Jun 2018 04:00)  T(F): 99.5 (13 Jun 2018 04:00), Max: 99.5 (13 Jun 2018 04:00)  HR: 105 (13 Jun 2018 07:00) (56 - 105)  BP: 144/89 (13 Jun 2018 07:00) (102/52 - 144/89)  BP(mean): 99 (13 Jun 2018 07:00) (62 - 107)    RR: 18 (13 Jun 2018 07:00) (10 - 27)  SpO2: 96% (13 Jun 2018 07:00) (92% - 99%)        06-12 @ 07:01  -  06-13 @ 07:00  --------------------------------------------------------  IN: 2550 mL / OUT: 0 mL / NET: 2550 mL      CAPILLARY BLOOD GLUCOSE      Gen: NAD, non-toxic  HENT: Normocephalic, atraumatic. External ears normal, no rhinorrhea, moist mucous membranes.   CV: RRR, no M/R/G  Resp: CTAB, non-labored  Abd: soft, non tender, non rigid, no guarding or rebound tenderness  Skin: dry, wwp       LINES:    HOSPITAL MEDICATIONS:  aspirin enteric coated 81 milliGRAM(s) Oral daily  enoxaparin Injectable 40 milliGRAM(s) SubCutaneous daily  ALBUTerol/ipratropium for Nebulization 3 milliLiter(s) Nebulizer every 6 hours PRN  buDESOnide   0.5 milliGRAM(s) Respule 0.5 milliGRAM(s) Inhalation daily  benztropine 0.5 milliGRAM(s) Oral two times a day  lacosamide IVPB 300 milliGRAM(s) IV Intermittent every 12 hours  levETIRAcetam  IVPB 1000 milliGRAM(s) IV Intermittent every 12 hours  LORazepam   Injectable 1 milliGRAM(s) IV Push three times a day PRN  OLANZapine 7.5 milliGRAM(s) Oral at bedtime  valproate sodium IVPB 1000 milliGRAM(s) IV Intermittent every 12 hours  lactulose Syrup 10 Gram(s) Oral two times a day  pantoprazole  Injectable 40 milliGRAM(s) IV Push daily  dextrose 5% + sodium chloride 0.9%. 1000 milliLiter(s) IV Continuous <Continuous>  AQUAPHOR (petrolatum Ointment) 1 Application(s) Topical two times a day  artificial  tears Solution 2 Drop(s) Both EYES three times a day  clotrimazole 1% Cream 1 Application(s) Topical two times a day  fluticasone propionate 50 MICROgram(s)/spray Nasal Spray 1 Spray(s) Both Nostrils two times a day  nystatin Powder 1 Application(s) Topical two times a day        LABS:                        10.7   5.01  )-----------( 104      ( 13 Jun 2018 02:30 )             34.4     Hgb Trend: 10.7<--, 11.1<--, 10.7<--, 12.3<--, 10.7<--  06-13    145  |  111<H>  |  2<L>  ----------------------------<  94  4.0   |  23  |  0.47<L>    Ca    7.1<L>      13 Jun 2018 02:30  Phos  2.6     06-13  Mg     1.4     06-13    TPro  5.7<L>  /  Alb  2.3<L>  /  TBili  < 0.2<L>  /  DBili  x   /  AST  44<H>  /  ALT  27  /  AlkPhos  58  06-13    Creatinine Trend: 0.47<--, 0.54<--, 0.61<--, 0.58<--, 0.57<--, 0.71<--

## 2018-06-13 NOTE — PROGRESS NOTE ADULT - SUBJECTIVE AND OBJECTIVE BOX
Patient more alert today. Off Ativan.  Seizure activity last night. No obvious signs of distress/pain.    Vital Signs Last 24 Hrs  T(C): 34.8 (13 Jun 2018 16:00), Max: 37.5 (13 Jun 2018 04:00)  T(F): 94.6 (13 Jun 2018 16:00), Max: 99.5 (13 Jun 2018 04:00)  HR: 74 (13 Jun 2018 19:00) (60 - 105)  BP: 132/77 (13 Jun 2018 19:00) (98/44 - 144/89)  BP(mean): 89 (13 Jun 2018 19:00) (57 - 107)  RR: 16 (13 Jun 2018 19:00) (12 - 27)  SpO2: 96% (13 Jun 2018 19:00) (90% - 98%)       EKG  Telemetry:  Normal sinus rhythm with NSVT, APC's.  No Wenckebach noted today.  Last  episode 6/12    MEDICATIONS  (STANDING):  AQUAPHOR (petrolatum Ointment) 1 Application(s) Topical two times a day  artificial  tears Solution 2 Drop(s) Both EYES three times a day  aspirin enteric coated 81 milliGRAM(s) Oral daily  benztropine 0.5 milliGRAM(s) Oral two times a day  buDESOnide   0.5 milliGRAM(s) Respule 0.5 milliGRAM(s) Inhalation daily  clotrimazole 1% Cream 1 Application(s) Topical two times a day  dextrose 5% + sodium chloride 0.9%. 1000 milliLiter(s) (100 mL/Hr) IV Continuous <Continuous>  enoxaparin Injectable 40 milliGRAM(s) SubCutaneous daily  fluticasone propionate 50 MICROgram(s)/spray Nasal Spray 1 Spray(s) Both Nostrils two times a day  lacosamide IVPB 300 milliGRAM(s) IV Intermittent every 12 hours  lactulose Syrup 10 Gram(s) Oral two times a day  levETIRAcetam  IVPB 1000 milliGRAM(s) IV Intermittent every 12 hours  nystatin Powder 1 Application(s) Topical two times a day  OLANZapine 7.5 milliGRAM(s) Oral at bedtime  pantoprazole    Tablet 40 milliGRAM(s) Oral before breakfast  valproate sodium IVPB 1000 milliGRAM(s) IV Intermittent every 12 hours    MEDICATIONS  (PRN):  ALBUTerol/ipratropium for Nebulization 3 milliLiter(s) Nebulizer every 6 hours PRN Shortness of Breath and/or Wheezing      Physical exam:   Gen- nonverbal.  Eating applesauce without difficulty.  No obvious signs of distress.  Resp- poor effort.  No adventitious sounds appreciated.  CV- Normal S1 and S2 RRR  ABD- obese nontender +bowel sounds  EXT- venodynes.  No edema  Neuro- nonverbal.  Facial symmetry.                             10.7   5.01  )-----------( 104      ( 13 Jun 2018 02:30 )             34.4       06-13    145  |  111<H>  |  2<L>  ----------------------------<  94  4.0   |  23  |  0.47<L>    Ca    7.1<L>      13 Jun 2018 02:30  Phos  2.6     06-13  Mg     1.4     06-13    TPro  5.7<L>  /  Alb  2.3<L>  /  TBili  < 0.2<L>  /  DBili  x   /  AST  44<H>  /  ALT  27  /  AlkPhos  58  06-13

## 2018-06-13 NOTE — PROGRESS NOTE ADULT - ASSESSMENT
54 y/o female with PmHx of cerebral palsy and seizures had 4 seizure-like episodes in less than 24 hours.     Problem/Plan - 1:  ·  Problem: Recurrent Seizures.  Plan: Have stopped Seizing now .   Management per neurology and Epilepsy specialist .   S/P EEG There were 18 electroclinical and subclinical seizures captured over the span of ~3hrs: 12 from the right hemisphere and 6 with onset in the left hemisphere.   There is evidence of moderate nonspecific diffuse or multifocal cerebral dysfunction     Problem/Plan - 2:  ·  Problem: Hypothermia.  Plan: Resolved .  Blood Cx negative . Urine Cx negative so far.       Problem/Plan - 3:  ·  Problem: Abnormal EKG with NSVT with Bradyarrhythmia .  Plan:  TTE pending.  Cardiology/EP  helping.      Problem/Plan - 4:  ·  Problem: Cerebral palsy, unspecified type.  Plan: Wheel chair bound. Supportive care .       Problem/Plan - 5:  ·  Problem: Intermittent asthma without complication, unspecified asthma severity.  Plan: Stable. Continue ventolin PRN, budesonide.      Problem/Plan - 6:  Problem: Mood disturbance. Plan: Continue Olanzapine.     Problem/Plan - 7:  ·  Problem: Constipation, unspecified constipation type.  Plan: Continue colace, Senna Miralax and Lactulose, MOM & fleet enema prn.      Problem/Plan - 8:  ·  Problem: Prophylactic measure.  Plan: DVT prophylaxis with enoxaparin. Continue ASA, furosemide, Nexium, Flonase, calcium + vitamin D, ferrous sulfate, folic acid, multivitamin and vitamin B1.

## 2018-06-13 NOTE — PROGRESS NOTE ADULT - SUBJECTIVE AND OBJECTIVE BOX
INTERVAL HPI/OVERNIGHT EVENTS: Seen and examined with mother in room . More awake.   Vital Signs Last 24 Hrs  T(C): 34.8 (13 Jun 2018 16:00), Max: 37.5 (13 Jun 2018 04:00)  T(F): 94.6 (13 Jun 2018 16:00), Max: 99.5 (13 Jun 2018 04:00)  HR: 70 (13 Jun 2018 16:00) (56 - 105)  BP: 144/78 (13 Jun 2018 16:00) (98/44 - 144/89)  BP(mean): 93 (13 Jun 2018 16:00) (57 - 107)  RR: 18 (13 Jun 2018 16:00) (17 - 27)  SpO2: 95% (13 Jun 2018 16:00) (90% - 98%)  I&O's Summary    12 Jun 2018 07:01  -  13 Jun 2018 07:00  --------------------------------------------------------  IN: 2550 mL / OUT: 0 mL / NET: 2550 mL    13 Jun 2018 07:01  -  13 Jun 2018 17:13  --------------------------------------------------------  IN: 1150 mL / OUT: 0 mL / NET: 1150 mL      MEDICATIONS  (STANDING):  AQUAPHOR (petrolatum Ointment) 1 Application(s) Topical two times a day  artificial  tears Solution 2 Drop(s) Both EYES three times a day  aspirin enteric coated 81 milliGRAM(s) Oral daily  benztropine 0.5 milliGRAM(s) Oral two times a day  buDESOnide   0.5 milliGRAM(s) Respule 0.5 milliGRAM(s) Inhalation daily  clotrimazole 1% Cream 1 Application(s) Topical two times a day  dextrose 5% + sodium chloride 0.9%. 1000 milliLiter(s) (100 mL/Hr) IV Continuous <Continuous>  enoxaparin Injectable 40 milliGRAM(s) SubCutaneous daily  fluticasone propionate 50 MICROgram(s)/spray Nasal Spray 1 Spray(s) Both Nostrils two times a day  lacosamide IVPB 300 milliGRAM(s) IV Intermittent every 12 hours  lactulose Syrup 10 Gram(s) Oral two times a day  levETIRAcetam  IVPB 1000 milliGRAM(s) IV Intermittent every 12 hours  nystatin Powder 1 Application(s) Topical two times a day  OLANZapine 7.5 milliGRAM(s) Oral at bedtime  pantoprazole  Injectable 40 milliGRAM(s) IV Push daily  valproate sodium IVPB 1000 milliGRAM(s) IV Intermittent every 12 hours    MEDICATIONS  (PRN):  ALBUTerol/ipratropium for Nebulization 3 milliLiter(s) Nebulizer every 6 hours PRN Shortness of Breath and/or Wheezing    LABS:                        10.7   5.01  )-----------( 104      ( 13 Jun 2018 02:30 )             34.4     06-13    145  |  111<H>  |  2<L>  ----------------------------<  94  4.0   |  23  |  0.47<L>    Ca    7.1<L>      13 Jun 2018 02:30  Phos  2.6     06-13  Mg     1.4     06-13    TPro  5.7<L>  /  Alb  2.3<L>  /  TBili  < 0.2<L>  /  DBili  x   /  AST  44<H>  /  ALT  27  /  AlkPhos  58  06-13          Consultant(s) Notes Reviewed:  [x ] YES  [ ] NO    PHYSICAL EXAM:  GENERAL: NAD, well-groomed, well-developed, not in any distress ,  HEAD:  Atraumatic, Normocephalic  EYES: EOMI, PERRLA, conjunctiva and sclera clear  NECK: Supple, No JVD, Normal thyroid  NERVOUS SYSTEM:  Alert , No focal deficit   CHEST/LUNG: Good air entry bilateral with no  rales, rhonchi, wheezing, or rubs  HEART: Regular rate and rhythm; No murmurs, rubs, or gallops  ABDOMEN: Soft, Nontender, Nondistended; Bowel sounds present  EXTREMITIES:  2+ Peripheral Pulses, No clubbing, cyanosis, or edema    Care Discussed with Consultants/Other Providers [ x] YES  [ ] NO

## 2018-06-13 NOTE — PROGRESS NOTE ADULT - ASSESSMENT
53F with hx seizure d/o with acute worsening of seizures. Admitted to MICU for monitoring in case of need for propofol and/or versed requiring intubation.     #Neuro:  - persistent seizure activity seen on vEEG.   - neuro following.  - As patient heavily sedated, decreased keppra to 1000mg BID.  - fosphenytoin being titrated off: 100mg BID today and 100mg once tomorrow.   - will consider decreasing Vinpat dose as can cause AR prolongation. Will await EP recs.   - continue with valproate 1000 BID.   - Ativan 1mg tid PRN.     #Cardiovascular:  - episode of bradycardia to 30s, nonsustained overnight with tele showing possible 2nd degree AV block Type II.   - EP called. Appreciate recs.     #Respiratory:  - no active issues.   -Continue duonebs PRN.    #GI:  -NGT attempted today for feeding.   -Keppra decreased as above to prevent over-sedation so that patient can eat.   -c/w PPI.   -trend LFTs with seizure medications.    #Renal:  -No active issues.    #Endocrine:  -No active issues.    #ID:   - hypothermia overnight. Known side effect of valproate.  - BCx/UCx 6/9 NGTD.   - monitor off abx.    #Heme:  -No active issues.    #DVT PPX:  -Lovenox.      Supriya Crews, PGY-1 EM

## 2018-06-13 NOTE — PROGRESS NOTE ADULT - ASSESSMENT
This is a female with status.  EEG shows small seizures that are seconds      Vimpat has been decreased most likely reason for cardiac issues     dilantin being taken off also     check valproic acid level

## 2018-06-13 NOTE — PROGRESS NOTE ADULT - SUBJECTIVE AND OBJECTIVE BOX
Neurology Progress    NAYANA JACQUES53yFemale    HPI:  54 y/o female with PmHx of cerebral palsy, intellectual disability, seizures and is non-verbal presented to [facility] ED after 4 seizures in less than 24 hours. Seizures occurred yesterday at ~0200, ~0700, ~1200 and today at ~0500. With each seizure the pt was described as being more confused than baseline for about 5 minutes as per facility RN. This seizure activity was the same as previous seizures witnessed by RN and other staff. Pt normally has seizure activity 1-2x per month. There was also incontinence with each seizure activity, but pt is normally incontinent for urine and stool.     Pt had similar seizures last month and she was hospitalized from 5/8-11. Pt was discharged with an increased keppra dose to 1000 mg BID and was also started on Depakaote 500mg BID in place of her carbamzepine. Since that time she saw her PCP around May 30th per facility RN and the PCP lowered the pt's Keppra dose from 1000 mg to 750mg due to her Keppra levels being too high and also continued her carbamazepine with depakote reportedly.     Facility RN denies any recent travel, evidence of recent infections, syncope, weight changes, night sweats, PND, vomiting, constipation, trauma or changes in other medications. (07 Jun 2018 07:46)      Past Medical History  Intellectual disability  Constipation  Seizure disorder  Cerebral palsy      Past Surgical History  No significant past surgical history      MEDICATIONS    ALBUTerol/ipratropium for Nebulization 3 milliLiter(s) Nebulizer every 6 hours PRN  AQUAPHOR (petrolatum Ointment) 1 Application(s) Topical two times a day  artificial  tears Solution 2 Drop(s) Both EYES three times a day  aspirin enteric coated 81 milliGRAM(s) Oral daily  benztropine 0.5 milliGRAM(s) Oral two times a day  buDESOnide   0.5 milliGRAM(s) Respule 0.5 milliGRAM(s) Inhalation daily  clotrimazole 1% Cream 1 Application(s) Topical two times a day  dextrose 5% + sodium chloride 0.9%. 1000 milliLiter(s) IV Continuous <Continuous>  enoxaparin Injectable 40 milliGRAM(s) SubCutaneous daily  fluticasone propionate 50 MICROgram(s)/spray Nasal Spray 1 Spray(s) Both Nostrils two times a day  lacosamide IVPB 300 milliGRAM(s) IV Intermittent every 12 hours  lactulose Syrup 10 Gram(s) Oral two times a day  levETIRAcetam  IVPB 1000 milliGRAM(s) IV Intermittent every 12 hours  nystatin Powder 1 Application(s) Topical two times a day  OLANZapine 7.5 milliGRAM(s) Oral at bedtime  pantoprazole  Injectable 40 milliGRAM(s) IV Push daily  valproate sodium IVPB 1000 milliGRAM(s) IV Intermittent every 12 hours         Family history: No history of dementia, strokes, or seizures   FAMILY HISTORY:  No pertinent family history in first degree relatives    SOCIAL HISTORY -- No history of tobacco or alcohol use     Allergies    Topamax (Unknown)    Intolerances            Vital Signs Last 24 Hrs  T(C): 36.7 (13 Jun 2018 08:00), Max: 37.5 (13 Jun 2018 04:00)  T(F): 98 (13 Jun 2018 08:00), Max: 99.5 (13 Jun 2018 04:00)  HR: 87 (13 Jun 2018 08:30) (56 - 105)  BP: 141/73 (13 Jun 2018 08:00) (102/52 - 144/89)  BP(mean): 89 (13 Jun 2018 08:00) (62 - 107)  RR: 20 (13 Jun 2018 08:00) (10 - 27)  SpO2: 93% (13 Jun 2018 08:30) (92% - 99%)        On Neurological Examination:    Mental Status - Patient is alert, awake,   Speech - non verbal                              Cranial Nerves - Extraocular muscle intact  WINSTON Facial symmetry Tongue midline, CnV1to V3 intact gross hearing intact      Motor Exam - moves UE>LE.      Sensory    withdraw    GENERAL Exam:     Nontoxic , No Acute Distress   	  HEENT:  normocephalic, atraumatic  		  LUNGS:	Clear bilaterally  No Wheeze      VASCULAR: no carotid brui  	  HEART:	 Normal S1S2   No murmur RRR        	  MUSCULOSKELETAL: Normal Range of Motion  	   SKIN:      Normal   No Ecchymosis               LABS:  CBC Full  -  ( 13 Jun 2018 02:30 )  WBC Count : 5.01 K/uL  Hemoglobin : 10.7 g/dL  Hematocrit : 34.4 %  Platelet Count - Automated : 104 K/uL  Mean Cell Volume : 105.2 fL  Mean Cell Hemoglobin : 32.7 pg  Mean Cell Hemoglobin Concentration : 31.1 %  Auto Neutrophil # : x  Auto Lymphocyte # : x  Auto Monocyte # : x  Auto Eosinophil # : x  Auto Basophil # : x  Auto Neutrophil % : x  Auto Lymphocyte % : x  Auto Monocyte % : x  Auto Eosinophil % : x  Auto Basophil % : x      06-13    145  |  111<H>  |  2<L>  ----------------------------<  94  4.0   |  23  |  0.47<L>    Ca    7.1<L>      13 Jun 2018 02:30  Phos  2.6     06-13  Mg     1.4     06-13    TPro  5.7<L>  /  Alb  2.3<L>  /  TBili  < 0.2<L>  /  DBili  x   /  AST  44<H>  /  ALT  27  /  AlkPhos  58  06-13    Hemoglobin A1C:     LIVER FUNCTIONS - ( 13 Jun 2018 02:30 )  Alb: 2.3 g/dL / Pro: 5.7 g/dL / ALK PHOS: 58 u/L / ALT: 27 u/L / AST: 44 u/L / GGT: x           Vitamin B12         RADIOLOGY    EEG      FINDINGS:  The background was continuous, spontaneously variable and reactive. There was no PDR. The background consisted of continuous diffuse polymorphic and rhythmic delta and theta frequencies.     Sleep Background:  Drowsiness and Stage II sleep transients were not recorded.    Other Non-Epileptiform Findings:  None were present.    Interictal Epileptiform Activity:   None    Events:     8 subclinical seizures captured between 16:45 and 07:35, with non-localizable, diffuse onset    EEG Description: Burst of alpha/beta activity diffusely over both hemispheres, progressing to admixture of semirhythmic alpha/theta activity, Duration ~10-15 sec    Clinical Description: no associated clinical signs    Activation Procedures:   Hyperventilation was not performed.    Photic stimulation was not performed.    Artifacts:  Intermittent myogenic and movement artifacts were noted.    ECG:  The heart rate on single channel ECG was predominantly between 60-70 BPM.    EEG Summary/Classification:  Abnormal EEG in awake state  -Total of 8 seizures captured: subclinical with non-localizable, diffuse onset  -Moderate background slowing     EEG Impression/Clinical Correlate:  1.	There were 8 subclinical seizures captured over the span of ~16hrs: the last seizure was recorded at 07:35 on 6/12/18   2.	There is evidence of moderate nonspecific diffuse or multifocal cerebral dysfunction    Pedro Morrison MD  Attending Physician               Electronic Signatures:  Pedro Morrison)  (Signed 12-Jun-2018 10:55)  	Authored: TECH INFORMATION, EEG REPORT      Last Updated: 12-Jun-2018 10:55 by Malpe, C schoenberg

## 2018-06-13 NOTE — EEG REPORT - NS EEG TEXT BOX
Study Date: 6/12/2018	to 6/13/2018	    Technical Information:					  On Instrument: -  Placement and Labeling of Electrodes:  The EEG was performed utilizing 20 channels referential EEG connections (coronal over temporal over parasagittal montage) using all standard 10-20 electrode placements with EKG.  Recording was at a sampling rate of 256 samples per second per channel.  Time synchronized digital video recording was done simultaneously with EEG recording.  A low light infrared camera was used for low light recording.  Valerio and seizure detection algorithms were utilized.    History:  THIS IS A ROUTINE EEG  52 YO FEMALE  MICU-13  CONVULSIONS  HX- CEREBRAL PALSY, SEIZURES, NON-VERBAL  PT ON KEPPRA  PT VERY AGITATED/DIFFICULT  PT ON NASAL O2  PT ON CARDIAC MONITOR  CONCERN FOR SEIZURE    Medication	  DEPACON	  FOSPHENYTOIN	  HHEPARIN	  Keppra	    Study Interpretation:    FINDINGS:  The background was continuous, spontaneously variable and reactive. There was no PDR. The background consisted of continuous diffuse polymorphic and rhythmic delta and theta frequencies.     Sleep Background:  Drowsiness and Stage II sleep transients were not recorded.    Other Non-Epileptiform Findings:  None were present.    Interictal Epileptiform Activity:   None    Events:     6 subclinical seizures captured between 17:46 and 23:36, with non-localizable, diffuse onset    EEG Description: Burst of alpha/beta activity diffusely over both hemispheres, progressing to admixture of semirhythmic alpha/theta activity, Duration ~10-15 sec    Clinical Description: no associated clinical signs    Activation Procedures:   Hyperventilation was not performed.    Photic stimulation was not performed.    Artifacts:  Intermittent myogenic and movement artifacts were noted.    ECG:  The heart rate on single channel ECG was predominantly between 60-70 BPM.    EEG Summary/Classification:  Abnormal EEG in awake state  -Total of 6 seizures captured: subclinical with non-localizable, diffuse onset  -Moderate background slowing     EEG Impression/Clinical Correlate:  1.	There were 6 subclinical seizures captured over the span of ~16hrs: the last seizure was recorded at 23:36 on 6/12/18   2.	There is evidence of moderate nonspecific diffuse or multifocal cerebral dysfunction    Pedro Morrison MD  Attending Physician

## 2018-06-14 LAB
ALBUMIN SERPL ELPH-MCNC: 2.2 G/DL — LOW (ref 3.3–5)
ALP SERPL-CCNC: 57 U/L — SIGNIFICANT CHANGE UP (ref 40–120)
ALT FLD-CCNC: 18 U/L — SIGNIFICANT CHANGE UP (ref 4–33)
AST SERPL-CCNC: 19 U/L — SIGNIFICANT CHANGE UP (ref 4–32)
BACTERIA BLD CULT: SIGNIFICANT CHANGE UP
BASOPHILS # BLD AUTO: 0.04 K/UL — SIGNIFICANT CHANGE UP (ref 0–0.2)
BASOPHILS NFR BLD AUTO: 0.8 % — SIGNIFICANT CHANGE UP (ref 0–2)
BILIRUB SERPL-MCNC: < 0.2 MG/DL — LOW (ref 0.2–1.2)
BUN SERPL-MCNC: < 2 MG/DL — LOW (ref 7–23)
CALCIUM SERPL-MCNC: 7 MG/DL — LOW (ref 8.4–10.5)
CHLORIDE SERPL-SCNC: 111 MMOL/L — HIGH (ref 98–107)
CO2 SERPL-SCNC: 28 MMOL/L — SIGNIFICANT CHANGE UP (ref 22–31)
CREAT SERPL-MCNC: 0.53 MG/DL — SIGNIFICANT CHANGE UP (ref 0.5–1.3)
EOSINOPHIL # BLD AUTO: 0.18 K/UL — SIGNIFICANT CHANGE UP (ref 0–0.5)
EOSINOPHIL NFR BLD AUTO: 3.4 % — SIGNIFICANT CHANGE UP (ref 0–6)
GLUCOSE SERPL-MCNC: 84 MG/DL — SIGNIFICANT CHANGE UP (ref 70–99)
HCT VFR BLD CALC: 32 % — LOW (ref 34.5–45)
HGB BLD-MCNC: 10.2 G/DL — LOW (ref 11.5–15.5)
IMM GRANULOCYTES # BLD AUTO: 0.12 # — SIGNIFICANT CHANGE UP
IMM GRANULOCYTES NFR BLD AUTO: 2.3 % — HIGH (ref 0–1.5)
LYMPHOCYTES # BLD AUTO: 1.75 K/UL — SIGNIFICANT CHANGE UP (ref 1–3.3)
LYMPHOCYTES # BLD AUTO: 33 % — SIGNIFICANT CHANGE UP (ref 13–44)
MAGNESIUM SERPL-MCNC: 1.7 MG/DL — SIGNIFICANT CHANGE UP (ref 1.6–2.6)
MCHC RBC-ENTMCNC: 31.9 % — LOW (ref 32–36)
MCHC RBC-ENTMCNC: 32.3 PG — SIGNIFICANT CHANGE UP (ref 27–34)
MCV RBC AUTO: 101.3 FL — HIGH (ref 80–100)
MONOCYTES # BLD AUTO: 0.67 K/UL — SIGNIFICANT CHANGE UP (ref 0–0.9)
MONOCYTES NFR BLD AUTO: 12.6 % — SIGNIFICANT CHANGE UP (ref 2–14)
NEUTROPHILS # BLD AUTO: 2.54 K/UL — SIGNIFICANT CHANGE UP (ref 1.8–7.4)
NEUTROPHILS NFR BLD AUTO: 47.9 % — SIGNIFICANT CHANGE UP (ref 43–77)
NRBC # FLD: 0.03 — SIGNIFICANT CHANGE UP
PHOSPHATE SERPL-MCNC: 2.7 MG/DL — SIGNIFICANT CHANGE UP (ref 2.5–4.5)
PLATELET # BLD AUTO: 94 K/UL — LOW (ref 150–400)
PMV BLD: 10.4 FL — SIGNIFICANT CHANGE UP (ref 7–13)
POTASSIUM SERPL-MCNC: 2.8 MMOL/L — CRITICAL LOW (ref 3.5–5.3)
POTASSIUM SERPL-SCNC: 2.8 MMOL/L — CRITICAL LOW (ref 3.5–5.3)
PROT SERPL-MCNC: 5.4 G/DL — LOW (ref 6–8.3)
RBC # BLD: 3.16 M/UL — LOW (ref 3.8–5.2)
RBC # FLD: 14.6 % — HIGH (ref 10.3–14.5)
SODIUM SERPL-SCNC: 147 MMOL/L — HIGH (ref 135–145)
T4 FREE SERPL-MCNC: 1.6 NG/DL — SIGNIFICANT CHANGE UP
WBC # BLD: 5.3 K/UL — SIGNIFICANT CHANGE UP (ref 3.8–10.5)
WBC # FLD AUTO: 5.3 K/UL — SIGNIFICANT CHANGE UP (ref 3.8–10.5)

## 2018-06-14 PROCEDURE — 99232 SBSQ HOSP IP/OBS MODERATE 35: CPT

## 2018-06-14 PROCEDURE — 36000 PLACE NEEDLE IN VEIN: CPT

## 2018-06-14 PROCEDURE — 95819 EEG AWAKE AND ASLEEP: CPT | Mod: 26

## 2018-06-14 PROCEDURE — 99291 CRITICAL CARE FIRST HOUR: CPT

## 2018-06-14 PROCEDURE — 76937 US GUIDE VASCULAR ACCESS: CPT | Mod: 26

## 2018-06-14 RX ORDER — POTASSIUM CHLORIDE 20 MEQ
40 PACKET (EA) ORAL EVERY 4 HOURS
Refills: 0 | Status: COMPLETED | OUTPATIENT
Start: 2018-06-14 | End: 2018-06-14

## 2018-06-14 RX ORDER — POTASSIUM CHLORIDE 20 MEQ
10 PACKET (EA) ORAL
Refills: 0 | Status: COMPLETED | OUTPATIENT
Start: 2018-06-14 | End: 2018-06-14

## 2018-06-14 RX ORDER — MAGNESIUM SULFATE 500 MG/ML
2 VIAL (ML) INJECTION ONCE
Refills: 0 | Status: COMPLETED | OUTPATIENT
Start: 2018-06-14 | End: 2018-06-14

## 2018-06-14 RX ADMIN — SODIUM CHLORIDE 100 MILLILITER(S): 9 INJECTION, SOLUTION INTRAVENOUS at 08:05

## 2018-06-14 RX ADMIN — LEVETIRACETAM 400 MILLIGRAM(S): 250 TABLET, FILM COATED ORAL at 00:05

## 2018-06-14 RX ADMIN — Medication 1 APPLICATION(S): at 05:12

## 2018-06-14 RX ADMIN — OLANZAPINE 7.5 MILLIGRAM(S): 15 TABLET, FILM COATED ORAL at 22:02

## 2018-06-14 RX ADMIN — Medication 3 MILLILITER(S): at 08:42

## 2018-06-14 RX ADMIN — Medication 50 GRAM(S): at 04:14

## 2018-06-14 RX ADMIN — Medication 30 MILLIGRAM(S): at 05:15

## 2018-06-14 RX ADMIN — NYSTATIN CREAM 1 APPLICATION(S): 100000 CREAM TOPICAL at 05:12

## 2018-06-14 RX ADMIN — Medication 2 DROP(S): at 05:12

## 2018-06-14 RX ADMIN — Medication 1 SPRAY(S): at 17:55

## 2018-06-14 RX ADMIN — Medication 40 MILLIEQUIVALENT(S): at 05:12

## 2018-06-14 RX ADMIN — Medication 0.5 MILLIGRAM(S): at 05:15

## 2018-06-14 RX ADMIN — SODIUM CHLORIDE 100 MILLILITER(S): 9 INJECTION, SOLUTION INTRAVENOUS at 22:01

## 2018-06-14 RX ADMIN — Medication 30 MILLIGRAM(S): at 17:55

## 2018-06-14 RX ADMIN — Medication 100 MILLIEQUIVALENT(S): at 07:05

## 2018-06-14 RX ADMIN — LEVETIRACETAM 400 MILLIGRAM(S): 250 TABLET, FILM COATED ORAL at 23:33

## 2018-06-14 RX ADMIN — PANTOPRAZOLE SODIUM 40 MILLIGRAM(S): 20 TABLET, DELAYED RELEASE ORAL at 08:04

## 2018-06-14 RX ADMIN — LACOSAMIDE 160 MILLIGRAM(S): 50 TABLET ORAL at 01:16

## 2018-06-14 RX ADMIN — SODIUM CHLORIDE 100 MILLILITER(S): 9 INJECTION, SOLUTION INTRAVENOUS at 13:45

## 2018-06-14 RX ADMIN — Medication 0.5 MILLIGRAM(S): at 09:50

## 2018-06-14 RX ADMIN — ENOXAPARIN SODIUM 40 MILLIGRAM(S): 100 INJECTION SUBCUTANEOUS at 11:55

## 2018-06-14 RX ADMIN — Medication 1 APPLICATION(S): at 17:54

## 2018-06-14 RX ADMIN — LEVETIRACETAM 400 MILLIGRAM(S): 250 TABLET, FILM COATED ORAL at 11:55

## 2018-06-14 RX ADMIN — Medication 2 DROP(S): at 13:45

## 2018-06-14 RX ADMIN — Medication 1 APPLICATION(S): at 17:55

## 2018-06-14 RX ADMIN — NYSTATIN CREAM 1 APPLICATION(S): 100000 CREAM TOPICAL at 17:55

## 2018-06-14 RX ADMIN — LACOSAMIDE 160 MILLIGRAM(S): 50 TABLET ORAL at 13:45

## 2018-06-14 RX ADMIN — Medication 100 MILLIEQUIVALENT(S): at 05:13

## 2018-06-14 RX ADMIN — Medication 100 MILLIEQUIVALENT(S): at 06:11

## 2018-06-14 RX ADMIN — Medication 2 DROP(S): at 22:42

## 2018-06-14 RX ADMIN — Medication 1 SPRAY(S): at 05:12

## 2018-06-14 NOTE — PROGRESS NOTE ADULT - SUBJECTIVE AND OBJECTIVE BOX
pt appears comfortable no signs of acute distress, non verbal   currently undergoing EEG                MEDICATIONS  (STANDING):  AQUAPHOR (petrolatum Ointment) 1 Application(s) Topical two times a day  artificial  tears Solution 2 Drop(s) Both EYES three times a day  aspirin enteric coated 81 milliGRAM(s) Oral daily  benztropine 0.5 milliGRAM(s) Oral two times a day  buDESOnide   0.5 milliGRAM(s) Respule 0.5 milliGRAM(s) Inhalation daily  clotrimazole 1% Cream 1 Application(s) Topical two times a day  dextrose 5% + sodium chloride 0.9%. 1000 milliLiter(s) (100 mL/Hr) IV Continuous <Continuous>  enoxaparin Injectable 40 milliGRAM(s) SubCutaneous daily  fluticasone propionate 50 MICROgram(s)/spray Nasal Spray 1 Spray(s) Both Nostrils two times a day  lacosamide IVPB 300 milliGRAM(s) IV Intermittent every 12 hours  lactulose Syrup 10 Gram(s) Oral two times a day  levETIRAcetam  IVPB 1000 milliGRAM(s) IV Intermittent every 12 hours  nystatin Powder 1 Application(s) Topical two times a day  OLANZapine 7.5 milliGRAM(s) Oral at bedtime  pantoprazole    Tablet 40 milliGRAM(s) Oral before breakfast  valproate sodium IVPB 1000 milliGRAM(s) IV Intermittent every 12 hours    MEDICATIONS  (PRN):  ALBUTerol/ipratropium for Nebulization 3 milliLiter(s) Nebulizer every 6 hours PRN Shortness of Breath and/or Wheezing      LABS:                        10.2   5.30  )-----------( 94       ( 14 Jun 2018 02:38 )             32.0     Hemoglobin: 10.2 g/dL (06-14 @ 02:38)  Hemoglobin: 10.7 g/dL (06-13 @ 02:30)  Hemoglobin: 11.1 g/dL (06-12 @ 03:25)  Hemoglobin: 10.7 g/dL (06-11 @ 02:50)  Hemoglobin: 12.3 g/dL (06-09 @ 23:00)    06-14    147<H>  |  111<H>  |  < 2<L>  ----------------------------<  84  2.8<LL>   |  28  |  0.53    Ca    7.0<L>      14 Jun 2018 02:38  Phos  2.7     06-14  Mg     1.7     06-14    TPro  5.4<L>  /  Alb  2.2<L>  /  TBili  < 0.2<L>  /  DBili  x   /  AST  19  /  ALT  18  /  AlkPhos  57  06-14    Creatinine Trend: 0.53<--, 0.47<--, 0.54<--, 0.61<--, 0.58<--, 0.57<--           PHYSICAL EXAM  Vital Signs Last 24 Hrs  T(C): 35.3 (14 Jun 2018 12:00), Max: 35.8 (14 Jun 2018 00:00)  T(F): 95.5 (14 Jun 2018 12:00), Max: 96.4 (14 Jun 2018 00:00)  HR: 76 (14 Jun 2018 12:00) (70 - 82)  BP: 94/55 (14 Jun 2018 11:00) (94/45 - 153/92)  BP(mean): 63 (14 Jun 2018 11:00) (55 - 109)  RR: 20 (14 Jun 2018 12:00) (12 - 26)  SpO2: 96% (14 Jun 2018 12:00) (90% - 98%)    Cardiovascular: Normal S1 S2, RRR  No JVD, 1/6 DIAZ murmur,  Respiratory: Lungs clear to auscultation, normal effort 	  Gastrointestinal:  Soft, Non-tender, + BS	  Extremities no edema, cyanosis, clubbing B/L LE's     TELEMETRY: SR, hx NSVT, APCS, BLocked APCS, Wenkebach on 6/12  	    RADIOLOGY:     < from: Xray Chest 1 View- PORTABLE-Urgent (06.06.18 @ 18:48) >  IMPRESSION:  Chin overlie and obscures portion of lung apices.    Uniform hazy opacity in peripheral lower left hemithorax obscuring   lateral left hemidiaphragm CP angle and left heart border related to   prominent epicardial fat as demonstrated on chestCT from 5/9/2018.     Sharp right CP angle. Clear remaining visualized lungs. No pneumothorax.    Heart size and mediastinal width inaccurately assessed on this projection.    Spinal degenerative changes again noted.    < end of copied text >    < from: CT Head No Cont (06.07.18 @ 02:40) >  Impression:     No acute intracranial hemorrhage, large cortical infarct or mass effect.   No significant interval change since 5/8/2018. If clinically indicated,   short-term follow-up or MRI may be obtained for further evaluation.    < end of copied text >  	  EEG   EEG Summary/Classification:  Abnormal EEG in awake state   - 	Total of 18 seizures captured: 9 electro-clinical seizures with right hemispheric onset (clinical semiology unclear), 3 electrographic seizures with right hemispheric onset and 6 electrographic seizures with left hemispheric onset.  -          Moderate background slowing     EEG Impression/Clinical Correlate:  1.	There were 18 electroclinical and subclinical seizures captured over the span of ~3hrs: 12 from the right hemisphere and 6 with onset in the left hemisphere.   2.	There is evidence of moderate nonspecific diffuse or multifocal cerebral dysfunction      ASSESSMENT/PLAN: 	52 y/o female with PmHx of cerebral palsy, intellectual disability, seizures, non-verbal at baseline, presented to [facility] ED after 4 seizures in less than 24 hours.  Cardiology consulted for abnormal EKG     --The patient has ruled out for acute coronary syndrome with negative serial cardiac enzymes  --EKG with NSR narrow QRS without acute ischemia   --Check TTE to assess LV function once EEG complete  --orthostatics negative  --tele events noted, avoid AVN blockers    --EP eval noted  --hypokalemic  [2.8]    supplement as per primary team   --cont supportive care as per MICU

## 2018-06-14 NOTE — PROGRESS NOTE ADULT - ASSESSMENT
53F with hx seizure d/o with acute worsening of seizures. Admitted to MICU for monitoring in case of need for propofol and/or versed requiring intubation.     #Neuro:  - persistent seizure activity seen on vEEG.   - neuro following.  - As patient heavily sedated, decreased keppra to 1000mg BID.  - fosphenytoin being titrated off: 100mg BID today and 100mg once tomorrow.   - will consider decreasing Vinpat dose as can cause DE prolongation. Will await EP recs.   - continue with valproate 1000 BID.   - Ativan 1mg tid PRN.     #Cardiovascular:  - episode of bradycardia to 30s, nonsustained overnight with tele showing possible 2nd degree AV block Type II.   - EP called, not heart block, ntd     #Respiratory:  - no active issues.   -Continue duonebs PRN.    #GI:  -NGT attempted today for feeding.   -Keppra decreased as above to prevent over-sedation so that patient can eat.   -c/w PPI.   -trend LFTs with seizure medications.    #Renal:  -No active issues.    #Endocrine:  -No active issues.    #ID:   - hypothermia overnight. Known side effect of valproate.  - BCx/UCx 6/9 NGTD.   - monitor off abx.    #Heme:  -No active issues.    #DVT PPX:  -Lovenox.      Supriya Crews, PGY-1 EM

## 2018-06-14 NOTE — PROGRESS NOTE ADULT - SUBJECTIVE AND OBJECTIVE BOX
Patient is a 53y old  Female who presents with a chief complaint of seizures (07 Jun 2018 15:19)  Awake alert; non-verbal. IN SR 70's bpm.  Noted hypokalemia K 2.8 on am labs    PAST MEDICAL & SURGICAL HISTORY:  Intellectual disability  Constipation  Seizure disorder  Cerebral palsy  No significant past surgical history      MEDICATIONS  (STANDING):  AQUAPHOR (petrolatum Ointment) 1 Application(s) Topical two times a day  artificial  tears Solution 2 Drop(s) Both EYES three times a day  aspirin enteric coated 81 milliGRAM(s) Oral daily  benztropine 0.5 milliGRAM(s) Oral two times a day  buDESOnide   0.5 milliGRAM(s) Respule 0.5 milliGRAM(s) Inhalation daily  clotrimazole 1% Cream 1 Application(s) Topical two times a day  dextrose 5% + sodium chloride 0.9%. 1000 milliLiter(s) (100 mL/Hr) IV Continuous <Continuous>  enoxaparin Injectable 40 milliGRAM(s) SubCutaneous daily  fluticasone propionate 50 MICROgram(s)/spray Nasal Spray 1 Spray(s) Both Nostrils two times a day  lacosamide IVPB 300 milliGRAM(s) IV Intermittent every 12 hours  lactulose Syrup 10 Gram(s) Oral two times a day  levETIRAcetam  IVPB 1000 milliGRAM(s) IV Intermittent every 12 hours  nystatin Powder 1 Application(s) Topical two times a day  OLANZapine 7.5 milliGRAM(s) Oral at bedtime  pantoprazole    Tablet 40 milliGRAM(s) Oral before breakfast  valproate sodium IVPB 1000 milliGRAM(s) IV Intermittent every 12 hours    MEDICATIONS  (PRN):  ALBUTerol/ipratropium for Nebulization 3 milliLiter(s) Nebulizer every 6 hours PRN Shortness of Breath and/or Wheezing            Vital Signs Last 24 Hrs  T(C): 35.3 (14 Jun 2018 12:00), Max: 35.8 (14 Jun 2018 00:00)  T(F): 95.5 (14 Jun 2018 12:00), Max: 96.4 (14 Jun 2018 00:00)  HR: 76 (14 Jun 2018 12:00) (70 - 82)  BP: 94/55 (14 Jun 2018 11:00) (94/45 - 153/92)  BP(mean): 63 (14 Jun 2018 11:00) (55 - 109)  RR: 20 (14 Jun 2018 12:00) (12 - 26)  SpO2: 96% (14 Jun 2018 12:00) (90% - 98%)            INTERPRETATION OF TELEMETRY:  NSR 70-80 bpm.  No bradyarrhythmia seen    ECG:  SR  1 st degree AVB AL interval 204 ms.         LABS:                        10.2   5.30  )-----------( 94       ( 14 Jun 2018 02:38 )             32.0     06-14    147<H>  |  111<H>  |  < 2<L>  ----------------------------<  84  2.8<LL>   |  28  |  0.53    Ca    7.0<L>      14 Jun 2018 02:38  Phos  2.7     06-14  Mg     1.7     06-14    TPro  5.4<L>  /  Alb  2.2<L>  /  TBili  < 0.2<L>  /  DBili  x   /  AST  19  /  ALT  18  /  AlkPhos  57  06-14              BNP  RADIOLOGY & ADDITIONAL STUDIES:      PHYSICAL EXAM:    GENERAL: In no apparent distress, well nourished, and hydrated.  Neck: supple  HEART: Regular rate and rhythm; No murmurs, rubs, or gallops.  PULMONARY: Poor respiratory effort No rales, wheezing, or rhonchi bilaterally.  ABDOMEN: Soft, Nontender, Nondistended; Bowel sounds present  NEUROLOGICAL: Non-verbal

## 2018-06-14 NOTE — PROGRESS NOTE ADULT - ASSESSMENT
52 y/o F w/ cerebral palsy and severe intellectual disability, intractable seizures  presenting w/ multiple seizures within 24 hours As per neurology, increase in seizure episodes likely due to  recent medication changes of Keppra 1000 mg BID to Keppra 750 mg BID (changed as an outpatient). The telemetry monitor demonstrated a slowing of the sinus node (prolongation of the P-P interval) most likely due to vagal hypertonia likely related to  ?sleep apnea/ hypothermia/sedation.  During these episodes she was hemodynamically stable and appeared to be asymptomatic.  Heart rhythm improved now in SR >60 bpm without Wenckebach or NSVT.      Continue telemetry     Get echocardiogram when EEG completed.     Optimize electrolytes      Avoid AV zaida blocking agents.     No pacing indication

## 2018-06-14 NOTE — EEG REPORT - NS EEG TEXT BOX
NAYANA JACQUES [id number]     Study Date: 		06-14-18    ROUTINE EEG    Technical Information:			  		  Placement and Labeling of Electrodes:  The EEG was performed utilizing 20 channels referential EEG connections (coronal over temporal over parasagittal montage) using all standard 10-20 electrode placements with EKG.  Recording was at a sampling rate of 256 samples per second per channel.  Time synchronized digital video recording was done simultaneously with EEG recording.  A low light infrared camera was used for low light recording.  Valerio and seizure detection algorithms were utilized.    CSA Technical Component:  Quantitative EEG analysis using a separate Compressed Spectral Array (CSA) software package was conducted in real-time and run at bedside after set up by the technician, digitally displaying the power of electrographic frequencies included in the 1-30Hz band using a graded color map.  This data was reviewed and interpreted independently, and is reported in a separate section below.    --------------------------------------------------------------------------------------------------  History:  CC/ HPI Patient is a 53y old  Female who presents with a chief complaint of seizures (07 Jun 2018 15:19)    MEDICATIONS  (STANDING):  AQUAPHOR (petrolatum Ointment) 1 Application(s) Topical two times a day  artificial  tears Solution 2 Drop(s) Both EYES three times a day  aspirin enteric coated 81 milliGRAM(s) Oral daily  benztropine 0.5 milliGRAM(s) Oral two times a day  buDESOnide   0.5 milliGRAM(s) Respule 0.5 milliGRAM(s) Inhalation daily  clotrimazole 1% Cream 1 Application(s) Topical two times a day  dextrose 5% + sodium chloride 0.9%. 1000 milliLiter(s) (100 mL/Hr) IV Continuous <Continuous>  enoxaparin Injectable 40 milliGRAM(s) SubCutaneous daily  fluticasone propionate 50 MICROgram(s)/spray Nasal Spray 1 Spray(s) Both Nostrils two times a day  lacosamide IVPB 300 milliGRAM(s) IV Intermittent every 12 hours  lactulose Syrup 10 Gram(s) Oral two times a day  levETIRAcetam  IVPB 1000 milliGRAM(s) IV Intermittent every 12 hours  nystatin Powder 1 Application(s) Topical two times a day  OLANZapine 7.5 milliGRAM(s) Oral at bedtime  pantoprazole    Tablet 40 milliGRAM(s) Oral before breakfast  valproate sodium IVPB 1000 milliGRAM(s) IV Intermittent every 12 hours    --------------------------------------------------------------------------------------------------  Study Interpretation:    FINDINGS:  The background was continuous, variable and reactive. It consisted of continuous polymorphic theta/delta frequencies There was no PDR.     Sleep Background:  Drowsiness was characterized by fragmentation, attenuation, and slowing of the background activity.    Stage II sleep transients were not recorded.    Epileptiform Activity:   No epileptiform discharges were present.    Events:  No clinical events were recorded.  No seizures were recorded.    Activation Procedures:   -Hyperventilation was not performed.    -Photic stimulation was not performed.    Artifacts:  Intermittent myogenic and movement artifacts were noted.    ECG:  The heart rate on single channel ECG was predominantly between 60-80BPM.  -----------------------------------------------------------------------------------------------------    EEG Classification / Summary:  Abnormal EEG in awake and drowsy states  -Background slowing     Clinical Impression:  Moderate nonspecific diffuse or multifocal cerebral dysfunction. There were no epileptiform abnormalities recorded.        Mary Walker DO  Neurophysiology Fellow    Pedro Morrison MD  Attending Physician NAYANA JACQUES [id number]     Study Date: 		06-14-18    ROUTINE EEG    Technical Information:			  		  Placement and Labeling of Electrodes:  The EEG was performed utilizing 20 channels referential EEG connections (coronal over temporal over parasagittal montage) using all standard 10-20 electrode placements with EKG.  Recording was at a sampling rate of 256 samples per second per channel.  Time synchronized digital video recording was done simultaneously with EEG recording.  A low light infrared camera was used for low light recording.  Valerio and seizure detection algorithms were utilized.    CSA Technical Component:  Quantitative EEG analysis using a separate Compressed Spectral Array (CSA) software package was conducted in real-time and run at bedside after set up by the technician, digitally displaying the power of electrographic frequencies included in the 1-30Hz band using a graded color map.  This data was reviewed and interpreted independently, and is reported in a separate section below.    --------------------------------------------------------------------------------------------------  History:  CC/ HPI Patient is a 53y old  Female who presents with a chief complaint of seizures (07 Jun 2018 15:19)    MEDICATIONS  (STANDING):  AQUAPHOR (petrolatum Ointment) 1 Application(s) Topical two times a day  artificial  tears Solution 2 Drop(s) Both EYES three times a day  aspirin enteric coated 81 milliGRAM(s) Oral daily  benztropine 0.5 milliGRAM(s) Oral two times a day  buDESOnide   0.5 milliGRAM(s) Respule 0.5 milliGRAM(s) Inhalation daily  clotrimazole 1% Cream 1 Application(s) Topical two times a day  dextrose 5% + sodium chloride 0.9%. 1000 milliLiter(s) (100 mL/Hr) IV Continuous <Continuous>  enoxaparin Injectable 40 milliGRAM(s) SubCutaneous daily  fluticasone propionate 50 MICROgram(s)/spray Nasal Spray 1 Spray(s) Both Nostrils two times a day  lacosamide IVPB 300 milliGRAM(s) IV Intermittent every 12 hours  lactulose Syrup 10 Gram(s) Oral two times a day  levETIRAcetam  IVPB 1000 milliGRAM(s) IV Intermittent every 12 hours  nystatin Powder 1 Application(s) Topical two times a day  OLANZapine 7.5 milliGRAM(s) Oral at bedtime  pantoprazole    Tablet 40 milliGRAM(s) Oral before breakfast  valproate sodium IVPB 1000 milliGRAM(s) IV Intermittent every 12 hours    --------------------------------------------------------------------------------------------------  Study Interpretation:    FINDINGS:  The background was continuous, variable and reactive. It consisted of continuous polymorphic theta/delta frequencies There was no PDR.     Sleep Background:  Drowsiness was characterized by fragmentation, attenuation, and slowing of the background activity.    Stage II sleep transients were not recorded.    Epileptiform Activity:   No epileptiform discharges were present.    Events:  No clinical events were recorded.  No seizures were recorded.    Activation Procedures:   -Hyperventilation was not performed.    -Photic stimulation was not performed.    Artifacts:  Intermittent myogenic and movement artifacts were noted.    ECG:  The heart rate on single channel ECG was predominantly between 60-80BPM.  -----------------------------------------------------------------------------------------------------    EEG Classification / Summary:  Abnormal EEG in awake and drowsy states  -Continuous slowing, generalized (delta and theta)     Clinical Impression:  Moderate nonspecific diffuse or multifocal cerebral dysfunction. There were no epileptiform abnormalities recorded.        Mary Walker DO  Neurophysiology Fellow    Pedro Morrison MD  Attending Physician

## 2018-06-14 NOTE — PROGRESS NOTE ADULT - SUBJECTIVE AND OBJECTIVE BOX
Neurology Progress    NAYANA JACQUES53yFemale    HPI:  54 y/o female with PmHx of cerebral palsy, intellectual disability, seizures and is non-verbal presented to [facility] ED after 4 seizures in less than 24 hours. Seizures occurred yesterday at ~0200, ~0700, ~1200 and today at ~0500. With each seizure the pt was described as being more confused than baseline for about 5 minutes as per facility RN. This seizure activity was the same as previous seizures witnessed by RN and other staff. Pt normally has seizure activity 1-2x per month. There was also incontinence with each seizure activity, but pt is normally incontinent for urine and stool.     Pt had similar seizures last month and she was hospitalized from 5/8-11. Pt was discharged with an increased keppra dose to 1000 mg BID and was also started on Depakaote 500mg BID in place of her carbamzepine. Since that time she saw her PCP around May 30th per facility RN and the PCP lowered the pt's Keppra dose from 1000 mg to 750mg due to her Keppra levels being too high and also continued her carbamazepine with depakote reportedly.     Facility RN denies any recent travel, evidence of recent infections, syncope, weight changes, night sweats, PND, vomiting, constipation, trauma or changes in other medications. (07 Jun 2018 07:46)      Past Medical History  Intellectual disability  Constipation  Seizure disorder  Cerebral palsy      Past Surgical History  No significant past surgical history      MEDICATIONS    ALBUTerol/ipratropium for Nebulization 3 milliLiter(s) Nebulizer every 6 hours PRN  AQUAPHOR (petrolatum Ointment) 1 Application(s) Topical two times a day  artificial  tears Solution 2 Drop(s) Both EYES three times a day  aspirin enteric coated 81 milliGRAM(s) Oral daily  benztropine 0.5 milliGRAM(s) Oral two times a day  buDESOnide   0.5 milliGRAM(s) Respule 0.5 milliGRAM(s) Inhalation daily  clotrimazole 1% Cream 1 Application(s) Topical two times a day  dextrose 5% + sodium chloride 0.9%. 1000 milliLiter(s) IV Continuous <Continuous>  enoxaparin Injectable 40 milliGRAM(s) SubCutaneous daily  fluticasone propionate 50 MICROgram(s)/spray Nasal Spray 1 Spray(s) Both Nostrils two times a day  lacosamide IVPB 300 milliGRAM(s) IV Intermittent every 12 hours  lactulose Syrup 10 Gram(s) Oral two times a day  levETIRAcetam  IVPB 1000 milliGRAM(s) IV Intermittent every 12 hours  nystatin Powder 1 Application(s) Topical two times a day  OLANZapine 7.5 milliGRAM(s) Oral at bedtime  pantoprazole    Tablet 40 milliGRAM(s) Oral before breakfast  potassium chloride   Powder 40 milliEquivalent(s) Oral every 4 hours  valproate sodium IVPB 1000 milliGRAM(s) IV Intermittent every 12 hours         Family history: No history of dementia, strokes, or seizures   FAMILY HISTORY:  No pertinent family history in first degree relatives    SOCIAL HISTORY -- No history of tobacco or alcohol use     Allergies    Topamax (Unknown)    Intolerances            Vital Signs Last 24 Hrs  T(C): 35.4 (14 Jun 2018 08:00), Max: 35.8 (13 Jun 2018 12:00)  T(F): 95.8 (14 Jun 2018 08:00), Max: 96.4 (13 Jun 2018 12:00)  HR: 70 (14 Jun 2018 08:37) (70 - 87)  BP: 119/67 (14 Jun 2018 08:00) (102/46 - 153/92)  BP(mean): 79 (14 Jun 2018 08:00) (59 - 109)  RR: 20 (14 Jun 2018 08:00) (12 - 26)  SpO2: 98% (14 Jun 2018 08:37) (90% - 98%)        On Neurological Examination:    Mental Status - Patient is alert, awake,   Speech - non verbal                              Cranial Nerves - Extraocular muscle intact  WINSTON Facial symmetry Tongue midline, CnV1to V3 intact gross hearing intact      Motor Exam - moves UE>LE  Muscle tone - is normal all over. No asymmetry is seen.      Sensory    Bilateral intact to light touch    GENERAL Exam:     Nontoxic , No Acute Distress   	  HEENT:  normocephalic, atraumatic  		  LUNGS:	Clear bilaterally  No Wheeze      VASCULAR: no carotid brui  	  HEART:	 Normal S1S2   No murmur RRR        	  MUSCULOSKELETAL: Normal Range of Motion  	   SKIN:      Normal   No Ecchymosis               LABS:  CBC Full  -  ( 14 Jun 2018 02:38 )  WBC Count : 5.30 K/uL  Hemoglobin : 10.2 g/dL  Hematocrit : 32.0 %  Platelet Count - Automated : 94 K/uL  Mean Cell Volume : 101.3 fL  Mean Cell Hemoglobin : 32.3 pg  Mean Cell Hemoglobin Concentration : 31.9 %  Auto Neutrophil # : 2.54 K/uL  Auto Lymphocyte # : 1.75 K/uL  Auto Monocyte # : 0.67 K/uL  Auto Eosinophil # : 0.18 K/uL  Auto Basophil # : 0.04 K/uL  Auto Neutrophil % : 47.9 %  Auto Lymphocyte % : 33.0 %  Auto Monocyte % : 12.6 %  Auto Eosinophil % : 3.4 %  Auto Basophil % : 0.8 %      06-14    147<H>  |  111<H>  |  < 2<L>  ----------------------------<  84  2.8<LL>   |  28  |  0.53    Ca    7.0<L>      14 Jun 2018 02:38  Phos  2.7     06-14  Mg     1.7     06-14    TPro  5.4<L>  /  Alb  2.2<L>  /  TBili  < 0.2<L>  /  DBili  x   /  AST  19  /  ALT  18  /  AlkPhos  57  06-14    Hemoglobin A1C:     LIVER FUNCTIONS - ( 14 Jun 2018 02:38 )  Alb: 2.2 g/dL / Pro: 5.4 g/dL / ALK PHOS: 57 u/L / ALT: 18 u/L / AST: 19 u/L / GGT: x           Vitamin B12         RADIOLOGY    EKG                 schoenberg

## 2018-06-14 NOTE — PROGRESS NOTE ADULT - SUBJECTIVE AND OBJECTIVE BOX
Interval Events: K was 2.8, was repleted. Pt was off EEG overnight but no acute events, taken off ativan yesterday, appeared to be more alert, ate soft foods, will continue to titrate meds     REVIEW OF SYSTEMS:  [ ] All other systems negative  [X] Unable to assess ROS because nonverbal baseline     OBJECTIVE:  ICU Vital Signs Last 24 Hrs  T(C): 35.7 (14 Jun 2018 04:00), Max: 36.7 (13 Jun 2018 08:00)  T(F): 96.2 (14 Jun 2018 04:00), Max: 98 (13 Jun 2018 08:00)  HR: 78 (14 Jun 2018 07:00) (70 - 101)  BP: 102/46 (14 Jun 2018 07:00) (98/44 - 153/92)  BP(mean): 59 (14 Jun 2018 07:00) (57 - 109)  RR: 24 (14 Jun 2018 07:00) (12 - 26)  SpO2: 94% (14 Jun 2018 07:00) (90% - 97%)        06-13 @ 07:01  -  06-14 @ 07:00  --------------------------------------------------------  IN: 3110 mL / OUT: 0 mL / NET: 3110 mL      CAPILLARY BLOOD GLUCOSE      POCT Blood Glucose.: 101 mg/dL (13 Jun 2018 21:05)    Physical Exam    Gen: NAD, non-toxic  HENT: Normocephalic, atraumatic. External ears normal, no rhinorrhea, moist mucous membranes.   CV: RRR, no M/R/G  Resp: CTAB, non-labored  Abd: soft, non tender, non rigid, no guarding or rebound tenderness  Skin: dry, wwp       HOSPITAL MEDICATIONS:  aspirin enteric coated 81 milliGRAM(s) Oral daily  enoxaparin Injectable 40 milliGRAM(s) SubCutaneous daily  ALBUTerol/ipratropium for Nebulization 3 milliLiter(s) Nebulizer every 6 hours PRN  buDESOnide   0.5 milliGRAM(s) Respule 0.5 milliGRAM(s) Inhalation daily  benztropine 0.5 milliGRAM(s) Oral two times a day  lacosamide IVPB 300 milliGRAM(s) IV Intermittent every 12 hours  levETIRAcetam  IVPB 1000 milliGRAM(s) IV Intermittent every 12 hours  OLANZapine 7.5 milliGRAM(s) Oral at bedtime  valproate sodium IVPB 1000 milliGRAM(s) IV Intermittent every 12 hours  lactulose Syrup 10 Gram(s) Oral two times a day  pantoprazole    Tablet 40 milliGRAM(s) Oral before breakfast  dextrose 5% + sodium chloride 0.9%. 1000 milliLiter(s) IV Continuous <Continuous>  potassium chloride   Powder 40 milliEquivalent(s) Oral every 4 hours  potassium chloride  10 mEq/100 mL IVPB 10 milliEquivalent(s) IV Intermittent every 1 hour  AQUAPHOR (petrolatum Ointment) 1 Application(s) Topical two times a day  artificial  tears Solution 2 Drop(s) Both EYES three times a day  clotrimazole 1% Cream 1 Application(s) Topical two times a day  fluticasone propionate 50 MICROgram(s)/spray Nasal Spray 1 Spray(s) Both Nostrils two times a day  nystatin Powder 1 Application(s) Topical two times a day        LABS:                        10.2   5.30  )-----------( 94       ( 14 Jun 2018 02:38 )             32.0     Hgb Trend: 10.2<--, 10.7<--, 11.1<--, 10.7<--, 12.3<--  06-14    147<H>  |  111<H>  |  < 2<L>  ----------------------------<  84  2.8<LL>   |  28  |  0.53    Ca    7.0<L>      14 Jun 2018 02:38  Phos  2.7     06-14  Mg     1.7     06-14    TPro  5.4<L>  /  Alb  2.2<L>  /  TBili  < 0.2<L>  /  DBili  x   /  AST  19  /  ALT  18  /  AlkPhos  57  06-14    Creatinine Trend: 0.53<--, 0.47<--, 0.54<--, 0.61<--, 0.58<--, 0.57<--

## 2018-06-14 NOTE — CHART NOTE - NSCHARTNOTEFT_GEN_A_CORE
Critically ill pt requring PIV access for medical management. Under US guidance identified Rt UE vein, prox to AC and successfully placed 20g x 1.88 inch angiocath into vessel. . Placement confirmed s/p with ultrasound and catheter determined to be in patent lumen of vein. Pt tolerated well w/o complication.       Dori Nickerson PA-C  [facility] [id number]

## 2018-06-14 NOTE — PROGRESS NOTE ADULT - ASSESSMENT
This is a female with status.  Staff denies any seizures     ativan being taken offf    leave keppra depakote and vimpat

## 2018-06-14 NOTE — PROGRESS NOTE ADULT - ASSESSMENT
54 y/o female with PmHx of cerebral palsy and seizures had 4 seizure-like episodes in less than 24 hours.     Problem/Plan - 1:  ·  Problem: Recurrent Seizures.  Plan: EEG in progress as very lethargic today .   Management per neurology and Epilepsy specialist .      Problem/Plan - 2:  ·  Problem: Hypokalemia and Hyponatremia .  Plan: Replacing.      Problem/Plan - 3:  ·  Problem: Abnormal EKG with NSVT with Bradyarrhythmia .  Plan:  TTE pending.  Cardiology/EP  helping.      Problem/Plan - 4:  ·  Problem: Cerebral palsy, unspecified type.  Plan: Wheel chair bound. Supportive care .       Problem/Plan - 5:  ·  Problem: Intermittent asthma without complication, unspecified asthma severity.  Plan: Stable. Continue ventolin PRN, budesonide.      Problem/Plan - 6:  Problem: Thrombocytopenia . Plan: On Lovenox . watching CBC closely.      Problem/Plan - 7:  ·  Problem: Constipation, unspecified constipation type.  Plan: Continue colace, Senna Miralax and Lactulose, MOM & fleet enema prn.      Problem/Plan - 8:  ·  Problem: Prophylactic measure.  Plan: DVT prophylaxis with enoxaparin. Continue ASA, furosemide, Nexium, Flonase, calcium + vitamin D, ferrous sulfate, folic acid, multivitamin and vitamin B1.

## 2018-06-14 NOTE — PROGRESS NOTE ADULT - SUBJECTIVE AND OBJECTIVE BOX
INTERVAL HPI/OVERNIGHT EVENTS: Very sleepy and lethargic today .   Vital Signs Last 24 Hrs  T(C): 35.3 (14 Jun 2018 12:00), Max: 35.8 (14 Jun 2018 00:00)  T(F): 95.5 (14 Jun 2018 12:00), Max: 96.4 (14 Jun 2018 00:00)  HR: 72 (14 Jun 2018 14:00) (70 - 82)  BP: 121/71 (14 Jun 2018 14:00) (94/45 - 153/92)  BP(mean): 83 (14 Jun 2018 14:00) (55 - 109)  RR: 21 (14 Jun 2018 14:00) (12 - 26)  SpO2: 93% (14 Jun 2018 14:00) (90% - 98%)  I&O's Summary    13 Jun 2018 07:01  -  14 Jun 2018 07:00  --------------------------------------------------------  IN: 3110 mL / OUT: 0 mL / NET: 3110 mL    14 Jun 2018 07:01  -  14 Jun 2018 14:17  --------------------------------------------------------  IN: 895 mL / OUT: 0 mL / NET: 895 mL      MEDICATIONS  (STANDING):  AQUAPHOR (petrolatum Ointment) 1 Application(s) Topical two times a day  artificial  tears Solution 2 Drop(s) Both EYES three times a day  aspirin enteric coated 81 milliGRAM(s) Oral daily  benztropine 0.5 milliGRAM(s) Oral two times a day  buDESOnide   0.5 milliGRAM(s) Respule 0.5 milliGRAM(s) Inhalation daily  clotrimazole 1% Cream 1 Application(s) Topical two times a day  dextrose 5% + sodium chloride 0.9%. 1000 milliLiter(s) (100 mL/Hr) IV Continuous <Continuous>  enoxaparin Injectable 40 milliGRAM(s) SubCutaneous daily  fluticasone propionate 50 MICROgram(s)/spray Nasal Spray 1 Spray(s) Both Nostrils two times a day  lacosamide IVPB 300 milliGRAM(s) IV Intermittent every 12 hours  lactulose Syrup 10 Gram(s) Oral two times a day  levETIRAcetam  IVPB 1000 milliGRAM(s) IV Intermittent every 12 hours  nystatin Powder 1 Application(s) Topical two times a day  OLANZapine 7.5 milliGRAM(s) Oral at bedtime  pantoprazole    Tablet 40 milliGRAM(s) Oral before breakfast  valproate sodium IVPB 1000 milliGRAM(s) IV Intermittent every 12 hours    MEDICATIONS  (PRN):  ALBUTerol/ipratropium for Nebulization 3 milliLiter(s) Nebulizer every 6 hours PRN Shortness of Breath and/or Wheezing    LABS:                        10.2   5.30  )-----------( 94       ( 14 Jun 2018 02:38 )             32.0     06-14    147<H>  |  111<H>  |  < 2<L>  ----------------------------<  84  2.8<LL>   |  28  |  0.53    Ca    7.0<L>      14 Jun 2018 02:38  Phos  2.7     06-14  Mg     1.7     06-14    TPro  5.4<L>  /  Alb  2.2<L>  /  TBili  < 0.2<L>  /  DBili  x   /  AST  19  /  ALT  18  /  AlkPhos  57  06-14        CAPILLARY BLOOD GLUCOSE      POCT Blood Glucose.: 101 mg/dL (13 Jun 2018 21:05)          Consultant(s) Notes Reviewed:  [x ] YES  [ ] NO    PHYSICAL EXAM:  GENERAL: NAD, well-groomed, well-developed, not in any distress ,  HEAD:  Atraumatic, Normocephalic  EYES: EOMI, PERRLA, conjunctiva and sclera clear  ENMT: No tonsillar erythema, exudates, or enlargement; Moist mucous membranes, Good dentition, No lesions  NECK: Supple, No JVD, Normal thyroid  NERVOUS SYSTEM:  lethargic and sleepy   CHEST/LUNG: Good air entry bilateral with no  rales, rhonchi, wheezing, or rubs  HEART: Regular rate and rhythm; No murmurs, rubs, or gallops  ABDOMEN: Soft, Nontender, Nondistended; Bowel sounds present  EXTREMITIES:  2+ Peripheral Pulses, No clubbing, cyanosis, or edema      Care Discussed with Consultants/Other Providers [ x] YES  [ ] NO

## 2018-06-15 DIAGNOSIS — G40.909 EPILEPSY, UNSPECIFIED, NOT INTRACTABLE, WITHOUT STATUS EPILEPTICUS: ICD-10-CM

## 2018-06-15 DIAGNOSIS — G80.9 CEREBRAL PALSY, UNSPECIFIED: ICD-10-CM

## 2018-06-15 DIAGNOSIS — Z29.9 ENCOUNTER FOR PROPHYLACTIC MEASURES, UNSPECIFIED: ICD-10-CM

## 2018-06-15 LAB
BASOPHILS # BLD AUTO: 0.03 K/UL — SIGNIFICANT CHANGE UP (ref 0–0.2)
BASOPHILS NFR BLD AUTO: 0.6 % — SIGNIFICANT CHANGE UP (ref 0–2)
BUN SERPL-MCNC: < 2 MG/DL — LOW (ref 7–23)
CALCIUM SERPL-MCNC: 7.2 MG/DL — LOW (ref 8.4–10.5)
CHLORIDE SERPL-SCNC: 111 MMOL/L — HIGH (ref 98–107)
CO2 SERPL-SCNC: 29 MMOL/L — SIGNIFICANT CHANGE UP (ref 22–31)
CREAT SERPL-MCNC: 0.62 MG/DL — SIGNIFICANT CHANGE UP (ref 0.5–1.3)
EOSINOPHIL # BLD AUTO: 0.3 K/UL — SIGNIFICANT CHANGE UP (ref 0–0.5)
EOSINOPHIL NFR BLD AUTO: 6.4 % — HIGH (ref 0–6)
GLUCOSE SERPL-MCNC: 93 MG/DL — SIGNIFICANT CHANGE UP (ref 70–99)
HCT VFR BLD CALC: 32.3 % — LOW (ref 34.5–45)
HGB BLD-MCNC: 9.9 G/DL — LOW (ref 11.5–15.5)
IMM GRANULOCYTES # BLD AUTO: 0.12 # — SIGNIFICANT CHANGE UP
IMM GRANULOCYTES NFR BLD AUTO: 2.6 % — HIGH (ref 0–1.5)
LYMPHOCYTES # BLD AUTO: 1.91 K/UL — SIGNIFICANT CHANGE UP (ref 1–3.3)
LYMPHOCYTES # BLD AUTO: 40.6 % — SIGNIFICANT CHANGE UP (ref 13–44)
MCHC RBC-ENTMCNC: 30.7 % — LOW (ref 32–36)
MCHC RBC-ENTMCNC: 32.6 PG — SIGNIFICANT CHANGE UP (ref 27–34)
MCV RBC AUTO: 106.3 FL — HIGH (ref 80–100)
MONOCYTES # BLD AUTO: 0.54 K/UL — SIGNIFICANT CHANGE UP (ref 0–0.9)
MONOCYTES NFR BLD AUTO: 11.5 % — SIGNIFICANT CHANGE UP (ref 2–14)
NEUTROPHILS # BLD AUTO: 1.8 K/UL — SIGNIFICANT CHANGE UP (ref 1.8–7.4)
NEUTROPHILS NFR BLD AUTO: 38.3 % — LOW (ref 43–77)
NRBC # FLD: 0.02 — SIGNIFICANT CHANGE UP
PLATELET # BLD AUTO: 93 K/UL — LOW (ref 150–400)
PMV BLD: 10.3 FL — SIGNIFICANT CHANGE UP (ref 7–13)
POTASSIUM SERPL-MCNC: 3.4 MMOL/L — LOW (ref 3.5–5.3)
POTASSIUM SERPL-SCNC: 3.4 MMOL/L — LOW (ref 3.5–5.3)
RBC # BLD: 3.04 M/UL — LOW (ref 3.8–5.2)
RBC # FLD: 14.8 % — HIGH (ref 10.3–14.5)
SODIUM SERPL-SCNC: 147 MMOL/L — HIGH (ref 135–145)
WBC # BLD: 4.7 K/UL — SIGNIFICANT CHANGE UP (ref 3.8–10.5)
WBC # FLD AUTO: 4.7 K/UL — SIGNIFICANT CHANGE UP (ref 3.8–10.5)

## 2018-06-15 PROCEDURE — 95951: CPT | Mod: 26

## 2018-06-15 PROCEDURE — 99232 SBSQ HOSP IP/OBS MODERATE 35: CPT

## 2018-06-15 PROCEDURE — 99291 CRITICAL CARE FIRST HOUR: CPT

## 2018-06-15 RX ORDER — POTASSIUM CHLORIDE 20 MEQ
10 PACKET (EA) ORAL
Refills: 0 | Status: COMPLETED | OUTPATIENT
Start: 2018-06-15 | End: 2018-06-15

## 2018-06-15 RX ADMIN — LACTULOSE 10 GRAM(S): 10 SOLUTION ORAL at 06:18

## 2018-06-15 RX ADMIN — LACOSAMIDE 160 MILLIGRAM(S): 50 TABLET ORAL at 13:50

## 2018-06-15 RX ADMIN — Medication 81 MILLIGRAM(S): at 12:35

## 2018-06-15 RX ADMIN — LEVETIRACETAM 400 MILLIGRAM(S): 250 TABLET, FILM COATED ORAL at 23:35

## 2018-06-15 RX ADMIN — Medication 1 MILLIGRAM(S): at 22:43

## 2018-06-15 RX ADMIN — Medication 2 DROP(S): at 05:19

## 2018-06-15 RX ADMIN — LACTULOSE 10 GRAM(S): 10 SOLUTION ORAL at 18:20

## 2018-06-15 RX ADMIN — ENOXAPARIN SODIUM 40 MILLIGRAM(S): 100 INJECTION SUBCUTANEOUS at 12:35

## 2018-06-15 RX ADMIN — LEVETIRACETAM 400 MILLIGRAM(S): 250 TABLET, FILM COATED ORAL at 12:35

## 2018-06-15 RX ADMIN — Medication 1 APPLICATION(S): at 18:19

## 2018-06-15 RX ADMIN — Medication 100 MILLIEQUIVALENT(S): at 04:59

## 2018-06-15 RX ADMIN — Medication 1 APPLICATION(S): at 05:19

## 2018-06-15 RX ADMIN — Medication 0.5 MILLIGRAM(S): at 18:19

## 2018-06-15 RX ADMIN — Medication 30 MILLIGRAM(S): at 05:24

## 2018-06-15 RX ADMIN — Medication 2 DROP(S): at 21:36

## 2018-06-15 RX ADMIN — NYSTATIN CREAM 1 APPLICATION(S): 100000 CREAM TOPICAL at 05:19

## 2018-06-15 RX ADMIN — NYSTATIN CREAM 1 APPLICATION(S): 100000 CREAM TOPICAL at 18:19

## 2018-06-15 RX ADMIN — PANTOPRAZOLE SODIUM 40 MILLIGRAM(S): 20 TABLET, DELAYED RELEASE ORAL at 07:46

## 2018-06-15 RX ADMIN — Medication 100 MILLIEQUIVALENT(S): at 05:49

## 2018-06-15 RX ADMIN — OLANZAPINE 7.5 MILLIGRAM(S): 15 TABLET, FILM COATED ORAL at 21:36

## 2018-06-15 RX ADMIN — Medication 30 MILLIGRAM(S): at 19:02

## 2018-06-15 RX ADMIN — SODIUM CHLORIDE 100 MILLILITER(S): 9 INJECTION, SOLUTION INTRAVENOUS at 07:46

## 2018-06-15 RX ADMIN — LACOSAMIDE 160 MILLIGRAM(S): 50 TABLET ORAL at 01:29

## 2018-06-15 RX ADMIN — Medication 1 SPRAY(S): at 05:19

## 2018-06-15 RX ADMIN — Medication 2 DROP(S): at 13:02

## 2018-06-15 RX ADMIN — Medication 0.5 MILLIGRAM(S): at 05:52

## 2018-06-15 RX ADMIN — Medication 0.5 MILLIGRAM(S): at 09:39

## 2018-06-15 RX ADMIN — Medication 100 MILLIEQUIVALENT(S): at 06:53

## 2018-06-15 RX ADMIN — Medication 1 SPRAY(S): at 18:19

## 2018-06-15 NOTE — DIETITIAN INITIAL EVALUATION ADULT. - PROBLEM SELECTOR PLAN 1
Neuro c/s in chart- Loaded with Keppra 1500 mg once. Increase Keppra 1000 mg BID. Continue Depakote 500mg BID.

## 2018-06-15 NOTE — PROGRESS NOTE ADULT - SUBJECTIVE AND OBJECTIVE BOX
CHIEF COMPLAINT:    Interval Events:    REVIEW OF SYSTEMS:  [ ] All other systems negative  [X] Unable to assess ROS because nonverbal baseline    OBJECTIVE:  ICU Vital Signs Last 24 Hrs  T(C): 36.1 (15 Phillip 2018 04:00), Max: 36.1 (15 Phillip 2018 00:00)  T(F): 97 (15 Phillip 2018 04:00), Max: 97 (15 Phillip 2018 04:00)  HR: 81 (15 Phillip 2018 07:00) (70 - 94)  BP: 127/65 (15 Phillip 2018 07:00) (94/45 - 133/60)  BP(mean): 80 (15 Phillip 2018 07:00) (55 - 95)  RR: 20 (15 Phillip 2018 07:00) (13 - 34)  SpO2: 95% (15 Phillip 2018 07:00) (90% - 98%)        06-14 @ 07:01  -  06-15 @ 07:00  --------------------------------------------------------  IN: 3195 mL / OUT: 0 mL / NET: 3195 mL      CAPILLARY BLOOD GLUCOSE      POCT Blood Glucose.: 101 mg/dL (13 Jun 2018 21:05)      PHYSICAL EXAM:  General:   HEENT:   Lymph Nodes:  Neck:   Respiratory:   Cardiovascular:   Abdomen:   Extremities:   Skin:   Neurological:  Psychiatry:    LINES:    HOSPITAL MEDICATIONS:  aspirin enteric coated 81 milliGRAM(s) Oral daily  enoxaparin Injectable 40 milliGRAM(s) SubCutaneous daily  ALBUTerol/ipratropium for Nebulization 3 milliLiter(s) Nebulizer every 6 hours PRN  buDESOnide   0.5 milliGRAM(s) Respule 0.5 milliGRAM(s) Inhalation daily  benztropine 0.5 milliGRAM(s) Oral two times a day  lacosamide IVPB 300 milliGRAM(s) IV Intermittent every 12 hours  levETIRAcetam  IVPB 1000 milliGRAM(s) IV Intermittent every 12 hours  OLANZapine 7.5 milliGRAM(s) Oral at bedtime  valproate sodium IVPB 1000 milliGRAM(s) IV Intermittent every 12 hours  lactulose Syrup 10 Gram(s) Oral two times a day  pantoprazole    Tablet 40 milliGRAM(s) Oral before breakfast  dextrose 5% + sodium chloride 0.9%. 1000 milliLiter(s) IV Continuous <Continuous>  AQUAPHOR (petrolatum Ointment) 1 Application(s) Topical two times a day  artificial  tears Solution 2 Drop(s) Both EYES three times a day  clotrimazole 1% Cream 1 Application(s) Topical two times a day  fluticasone propionate 50 MICROgram(s)/spray Nasal Spray 1 Spray(s) Both Nostrils two times a day  nystatin Powder 1 Application(s) Topical two times a day        LABS:                        9.9    4.70  )-----------( 93       ( 15 Phillip 2018 03:30 )             32.3     Hgb Trend: 9.9<--, 10.2<--, 10.7<--, 11.1<--, 10.7<--  06-15    147<H>  |  111<H>  |  < 2<L>  ----------------------------<  93  3.4<L>   |  29  |  0.62    Ca    7.2<L>      15 Phillip 2018 03:30  Phos  2.7     06-14  Mg     1.7     06-14    TPro  5.4<L>  /  Alb  2.2<L>  /  TBili  < 0.2<L>  /  DBili  x   /  AST  19  /  ALT  18  /  AlkPhos  57  06-14    Creatinine Trend: 0.62<--, 0.53<--, 0.47<--, 0.54<--, 0.61<--, 0.58<--      MICROBIOLOGY:     RADIOLOGY:  [ ] Reviewed and interpreted by me    EKG: Interval Events: no overnight events, EEG results pending, was more awake for staff this morning, OK to transfer to floors with neuro following    REVIEW OF SYSTEMS:  [ ] All other systems negative  [X] Unable to assess ROS because nonverbal baseline    OBJECTIVE:  ICU Vital Signs Last 24 Hrs  T(C): 36.1 (15 Phillip 2018 04:00), Max: 36.1 (15 Phillip 2018 00:00)  T(F): 97 (15 Phillip 2018 04:00), Max: 97 (15 Phillip 2018 04:00)  HR: 81 (15 Phillip 2018 07:00) (70 - 94)  BP: 127/65 (15 Phillip 2018 07:00) (94/45 - 133/60)  BP(mean): 80 (15 Phillip 2018 07:00) (55 - 95)  RR: 20 (15 Phillip 2018 07:00) (13 - 34)  SpO2: 95% (15 Phillip 2018 07:00) (90% - 98%)        06-14 @ 07:01  -  06-15 @ 07:00  --------------------------------------------------------  IN: 3195 mL / OUT: 0 mL / NET: 3195 mL      CAPILLARY BLOOD GLUCOSE      POCT Blood Glucose.: 101 mg/dL (13 Jun 2018 21:05)      Gen: NAD, non-toxic  Eyes: PERRLA, EOMI   HENT: Normocephalic, atraumatic. External ears normal, no rhinorrhea, moist mucous membranes.   CV: RRR, no M/R/G  Resp: CTAB, non-labored, speaking without difficulty on room air  Abd: soft, non tender, non rigid, no guarding or rebound tenderness  Skin: dry, wwp       HOSPITAL MEDICATIONS:  aspirin enteric coated 81 milliGRAM(s) Oral daily  enoxaparin Injectable 40 milliGRAM(s) SubCutaneous daily  ALBUTerol/ipratropium for Nebulization 3 milliLiter(s) Nebulizer every 6 hours PRN  buDESOnide   0.5 milliGRAM(s) Respule 0.5 milliGRAM(s) Inhalation daily  benztropine 0.5 milliGRAM(s) Oral two times a day  lacosamide IVPB 300 milliGRAM(s) IV Intermittent every 12 hours  levETIRAcetam  IVPB 1000 milliGRAM(s) IV Intermittent every 12 hours  OLANZapine 7.5 milliGRAM(s) Oral at bedtime  valproate sodium IVPB 1000 milliGRAM(s) IV Intermittent every 12 hours  lactulose Syrup 10 Gram(s) Oral two times a day  pantoprazole    Tablet 40 milliGRAM(s) Oral before breakfast  dextrose 5% + sodium chloride 0.9%. 1000 milliLiter(s) IV Continuous <Continuous>  AQUAPHOR (petrolatum Ointment) 1 Application(s) Topical two times a day  artificial  tears Solution 2 Drop(s) Both EYES three times a day  clotrimazole 1% Cream 1 Application(s) Topical two times a day  fluticasone propionate 50 MICROgram(s)/spray Nasal Spray 1 Spray(s) Both Nostrils two times a day  nystatin Powder 1 Application(s) Topical two times a day        LABS:                        9.9    4.70  )-----------( 93       ( 15 Phillip 2018 03:30 )             32.3     Hgb Trend: 9.9<--, 10.2<--, 10.7<--, 11.1<--, 10.7<--  06-15    147<H>  |  111<H>  |  < 2<L>  ----------------------------<  93  3.4<L>   |  29  |  0.62    Ca    7.2<L>      15 Phillip 2018 03:30  Phos  2.7     06-14  Mg     1.7     06-14    TPro  5.4<L>  /  Alb  2.2<L>  /  TBili  < 0.2<L>  /  DBili  x   /  AST  19  /  ALT  18  /  AlkPhos  57  06-14    Creatinine Trend: 0.62<--, 0.53<--, 0.47<--, 0.54<--, 0.61<--, 0.58<--      MICROBIOLOGY:     RADIOLOGY:  [ ] Reviewed and interpreted by me    EKG:

## 2018-06-15 NOTE — PROGRESS NOTE ADULT - SUBJECTIVE AND OBJECTIVE BOX
pt appears comfortable no signs of acute distress, non verbal       MEDICATIONS  (STANDING):  AQUAPHOR (petrolatum Ointment) 1 Application(s) Topical two times a day  artificial  tears Solution 2 Drop(s) Both EYES three times a day  aspirin enteric coated 81 milliGRAM(s) Oral daily  benztropine 0.5 milliGRAM(s) Oral two times a day  buDESOnide   0.5 milliGRAM(s) Respule 0.5 milliGRAM(s) Inhalation daily  clotrimazole 1% Cream 1 Application(s) Topical two times a day  enoxaparin Injectable 40 milliGRAM(s) SubCutaneous daily  fluticasone propionate 50 MICROgram(s)/spray Nasal Spray 1 Spray(s) Both Nostrils two times a day  lacosamide IVPB 300 milliGRAM(s) IV Intermittent every 12 hours  lactulose Syrup 10 Gram(s) Oral two times a day  levETIRAcetam  IVPB 1000 milliGRAM(s) IV Intermittent every 12 hours  nystatin Powder 1 Application(s) Topical two times a day  OLANZapine 7.5 milliGRAM(s) Oral at bedtime  pantoprazole    Tablet 40 milliGRAM(s) Oral before breakfast  valproate sodium IVPB 1000 milliGRAM(s) IV Intermittent every 12 hours    MEDICATIONS  (PRN):  ALBUTerol/ipratropium for Nebulization 3 milliLiter(s) Nebulizer every 6 hours PRN Shortness of Breath and/or Wheezing      LABS:                        9.9    4.70  )-----------( 93       ( 15 Phillip 2018 03:30 )             32.3     Hemoglobin: 9.9 g/dL (06-15 @ 03:30)  Hemoglobin: 10.2 g/dL (06-14 @ 02:38)  Hemoglobin: 10.7 g/dL (06-13 @ 02:30)  Hemoglobin: 11.1 g/dL (06-12 @ 03:25)  Hemoglobin: 10.7 g/dL (06-11 @ 02:50)    06-15    147<H>  |  111<H>  |  < 2<L>  ----------------------------<  93  3.4<L>   |  29  |  0.62    Ca    7.2<L>      15 Phillip 2018 03:30  Phos  2.7     06-14  Mg     1.7     06-14    TPro  5.4<L>  /  Alb  2.2<L>  /  TBili  < 0.2<L>  /  DBili  x   /  AST  19  /  ALT  18  /  AlkPhos  57  06-14    Creatinine Trend: 0.62<--, 0.53<--, 0.47<--, 0.54<--, 0.61<--, 0.58<--           PHYSICAL EXAM  Vital Signs Last 24 Hrs  T(C): 36.3 (15 Phillip 2018 11:41), Max: 36.3 (15 Phillip 2018 11:41)  T(F): 97.4 (15 Phillip 2018 11:41), Max: 97.4 (15 Phillip 2018 11:41)  HR: 81 (15 Phillip 2018 11:41) (72 - 94)  BP: 155/75 (15 Phillip 2018 11:41) (100/60 - 155/75)  BP(mean): 99 (15 Phillip 2018 10:00) (65 - 102)  RR: 20 (15 Phillip 2018 11:41) (13 - 34)  SpO2: 96% (15 Phillip 2018 10:00) (93% - 99%)    Cardiovascular: Normal S1 S2, RRR  No JVD, 1/6 DIAZ murmur,  Respiratory: A/L  Lungs clear to auscultation, normal effort 	  Gastrointestinal:  Soft, Non-tender, + BS	  Extremities no edema, cyanosis, clubbing B/L LE's     TELEMETRY: SR, hx NSVT, APCS, BLocked APCS, Wenkebach on 6/12  	    RADIOLOGY:     < from: Xray Chest 1 View- PORTABLE-Urgent (06.06.18 @ 18:48) >  IMPRESSION:  Chin overlie and obscures portion of lung apices.    Uniform hazy opacity in peripheral lower left hemithorax obscuring   lateral left hemidiaphragm CP angle and left heart border related to   prominent epicardial fat as demonstrated on chestCT from 5/9/2018.     Sharp right CP angle. Clear remaining visualized lungs. No pneumothorax.    Heart size and mediastinal width inaccurately assessed on this projection.    Spinal degenerative changes again noted.    < end of copied text >    < from: CT Head No Cont (06.07.18 @ 02:40) >  Impression:     No acute intracranial hemorrhage, large cortical infarct or mass effect.   No significant interval change since 5/8/2018. If clinically indicated,   short-term follow-up or MRI may be obtained for further evaluation.    < end of copied text >  	  EEG   EEG Summary/Classification:  Abnormal EEG in awake state   - 	Total of 18 seizures captured: 9 electro-clinical seizures with right hemispheric onset (clinical semiology unclear), 3 electrographic seizures with right hemispheric onset and 6 electrographic seizures with left hemispheric onset.  -          Moderate background slowing     EEG Impression/Clinical Correlate:  1.	There were 18 electroclinical and subclinical seizures captured over the span of ~3hrs: 12 from the right hemisphere and 6 with onset in the left hemisphere.   2.	There is evidence of moderate nonspecific diffuse or multifocal cerebral dysfunction      ASSESSMENT/PLAN: 	54 y/o female with PmHx of cerebral palsy, intellectual disability, seizures, non-verbal at baseline, presented to [facility] ED after 4 seizures in less than 24 hours.  Cardiology consulted for abnormal EKG     --The patient has ruled out for acute coronary syndrome with negative serial cardiac enzymes  --EKG with NSR narrow QRS without acute ischemia   --Check TTE to assess LV function once EEG complete   --orthostatics negative  --tele events noted, avoid AVN blockers    --EP eval noted  --hypokalemic  [3.5]    supplement as per primary team

## 2018-06-15 NOTE — CHART NOTE - NSCHARTNOTEFT_GEN_A_CORE
SAWYER NAYANA  MRN-[id number]  ACCEPTING PHYSICIAN: Leatha Dover  [place] STAFF TEAM 2    Pending Transfer to [address] [place] [address]      HPI:  53F PMH cerebral palsy, seizures, nonverbal baseline, presented to [facility] from group home on 6/7/18 for recurrent seizures. In ED, had 2 witnessed seizures that resolved with Ativan and was admitted to medical service. Was previously on Keppra 1000mg BID and Depakote 500mg BID, changed to Keppra 750mg BID on 5/30/18 by PMD. While admitted, pt with 28+ episodes of brief (45sec-1min) tonic clonic seizure activity over 24 hours. Reported to have had no fevers or infectious symptoms from group [place]. Per neuro, seizure witnessed by resident consisted of: "Head turning from towards right, ictal cry, eye deviation to right and generalized stiffening of extremities" which aid at bedside and floor RNs state is consistent with the seizures pt has been having on the floor. After recurrent seizures, neuro recommending Fosphenytoin 500BID continuation, fosphenytoin load of 1500mg, cont keppra and depakote. Patient was accepted to MICU for concern for Status Epilepticus.  ===========================================  MICU Course:  Patient was having frequent seizures and vimpat and ativan were both added. At that time, she was on a regimen of keppra, valproate, fosphenytoin, vimpat, ativan. Never required intubation. Continuous EEG monitoring and gradual reduction of seizures with titration of seizure medications. Over the last 3-4 days, fosphenytoin and ativan were tapered off with reduction of keppra to 1000 bid from 1500. Vimpat 300 bid and valproate 1000 bid. Pt has been more awake over the last 24-48 hours, recognized her mother and smiled, and has been able to tolerate PO. Occasionally wheezing throughout MICU stay. Per family, patient still not quite at her baseline mental status.     ===========================================  Vital Signs Last 24 Hrs  T(C): 36.1 (15 Phillip 2018 10:00), Max: 36.1 (15 Phillip 2018 00:00)  T(F): 97 (15 Phillip 2018 10:00), Max: 97 (15 Phillip 2018 04:00)  HR: 77 (15 Phillip 2018 10:00) (72 - 94)  BP: 152/84 (15 Phillip 2018 10:00) (100/60 - 152/84)  BP(mean): 99 (15 Phillip 2018 10:00) (65 - 102)  RR: 23 (15 Phillip 2018 10:00) (13 - 34)  SpO2: 96% (15 Phillip 2018 10:00) (93% - 99%)  ============================================  MEDICATIONS  (STANDING):  AQUAPHOR (petrolatum Ointment) 1 Application(s) Topical two times a day  artificial  tears Solution 2 Drop(s) Both EYES three times a day  aspirin enteric coated 81 milliGRAM(s) Oral daily  benztropine 0.5 milliGRAM(s) Oral two times a day  buDESOnide   0.5 milliGRAM(s) Respule 0.5 milliGRAM(s) Inhalation daily  clotrimazole 1% Cream 1 Application(s) Topical two times a day  enoxaparin Injectable 40 milliGRAM(s) SubCutaneous daily  fluticasone propionate 50 MICROgram(s)/spray Nasal Spray 1 Spray(s) Both Nostrils two times a day  lacosamide IVPB 300 milliGRAM(s) IV Intermittent every 12 hours  lactulose Syrup 10 Gram(s) Oral two times a day  levETIRAcetam  IVPB 1000 milliGRAM(s) IV Intermittent every 12 hours  nystatin Powder 1 Application(s) Topical two times a day  OLANZapine 7.5 milliGRAM(s) Oral at bedtime  pantoprazole    Tablet 40 milliGRAM(s) Oral before breakfast  valproate sodium IVPB 1000 milliGRAM(s) IV Intermittent every 12 hours    MEDICATIONS  (PRN):  ALBUTerol/ipratropium for Nebulization 3 milliLiter(s) Nebulizer every 6 hours PRN Shortness of Breath and/or Wheezing    ============================================  ASSESSMENT & PLAN:   53F with hx seizure d/o with acute worsening of seizures. Admitted to MICU for monitoring in case of need for propofol and/or versed requiring intubation.     #Neuro:  - persistent seizure activity seen on vEEG.   - neuro following.  - As patient heavily sedated, decreased keppra to 1000mg BID.  - fosphenytoin was titrated off  - will consider decreasing Vinpat dose as can cause UT prolongation. Will await EP recs.   - continue with valproate 1000 BID.     #Cardiovascular:  - episode of bradycardia to 30s, nonsustained overnight with tele showing possible 2nd degree AV block Type II.   - EP called, not heart block, ntd     #Respiratory:  - no active issues.   -Continue duonebs PRN.    #GI:  -Keppra decreased as above to prevent over-sedation so that patient can eat.   -c/w PPI.   -trend LFTs with seizure medications.    For Follow-Up:  -Follow up EEG results from overnight   -Continue to titrate down seizure medications per neuro recs, ?keppra from 1000 to 750,   -Continue to ensure PO intake.

## 2018-06-15 NOTE — PROGRESS NOTE ADULT - SUBJECTIVE AND OBJECTIVE BOX
PROVIDER CONTACT INFO:  MD MICHELLE BradyJ Pager: [phone]  [place] Pager: [phone]    JACQUES NAYANA  53y  Female      Patient is a 53y old  Female who presents with a chief complaint of seizures (07 Jun 2018 15:19)      INTERVAL HPI/OVERNIGHT EVENTS: No other interval events.     T(C): 36.1 (06-15-18 @ 10:00), Max: 36.1 (06-15-18 @ 00:00)  HR: 77 (06-15-18 @ 10:00) (72 - 94)  BP: 152/84 (06-15-18 @ 10:00) (100/60 - 152/84)  RR: 23 (06-15-18 @ 10:00) (13 - 34)  SpO2: 96% (06-15-18 @ 10:00) (93% - 99%)  Wt(kg): --Vital Signs Last 24 Hrs  T(C): 36.1 (15 Phillip 2018 10:00), Max: 36.1 (15 Phillip 2018 00:00)  T(F): 97 (15 Phillip 2018 10:00), Max: 97 (15 Phillip 2018 04:00)  HR: 77 (15 Phillip 2018 10:00) (72 - 94)  BP: 152/84 (15 Phillip 2018 10:00) (100/60 - 152/84)  BP(mean): 99 (15 Phillip 2018 10:00) (65 - 102)  RR: 23 (15 Phillip 2018 10:00) (13 - 34)  SpO2: 96% (15 Phillip 2018 10:00) (93% - 99%)    PHYSICAL EXAM:  GENERAL: NAD, well-groomed, well-developed  HEAD:  Atraumatic, Normocephalic  EYES: EOMI, PERRLA, conjunctiva and sclera clear  ENMT: No tonsillar erythema, exudates,; Moist mucous membranes. No lesions  NECK: Supple, No JVD, Normal thyroid  CHEST/LUNG: Clear to percussion bilaterally; No rales, rhonchi, wheezing, or rubs  HEART: Regular rate and rhythm; No murmurs, rubs, or gallops  ABDOMEN: Soft, Nontender, Nondistended; Bowel sounds present.  No hepatosplenomegaly  EXTREMITIES:  2+ Peripheral Pulses, No clubbing, cyanosis, or edema  LYMPH: No lymphadenopathy noted  SKIN: No rashes or lesions  PSYCH: Alert & Oriented x3    Consultant(s) Notes Reviewed:  [x ] YES  [ ] NO  Care Discussed with Consultants/Other Providers [ x] YES  [ ] NO    LABS:                        9.9    4.70  )-----------( 93       ( 15 Phillip 2018 03:30 )             32.3     06-15    147<H>  |  111<H>  |  < 2<L>  ----------------------------<  93  3.4<L>   |  29  |  0.62    Ca    7.2<L>      15 Phillip 2018 03:30  Phos  2.7     06-14  Mg     1.7     06-14    TPro  5.4<L>  /  Alb  2.2<L>  /  TBili  < 0.2<L>  /  DBili  x   /  AST  19  /  ALT  18  /  AlkPhos  57  06-14

## 2018-06-15 NOTE — EEG REPORT - THIS IS A
Continuous Video EEG

## 2018-06-15 NOTE — CHART NOTE - NSCHARTNOTEFT_GEN_A_CORE
ELECTROPHYSIOLOGY      Patient transferred to [address] [place] [address], a non telemetry floor.  Remains lethargic but comfortable.  Vital signs stable with heart rate trend 70-80's b/min.  Will sign off as no way to monitor for an arrhythmia and no documentation of bradycardia events overnight.

## 2018-06-15 NOTE — PROGRESS NOTE ADULT - SUBJECTIVE AND OBJECTIVE BOX
Neurology Progress    NAYANA JACQUES53yFemale    HPI:  54 y/o female with PmHx of cerebral palsy, intellectual disability, seizures and is non-verbal presented to [facility] ED after 4 seizures in less than 24 hours. Seizures occurred yesterday at ~0200, ~0700, ~1200 and today at ~0500. With each seizure the pt was described as being more confused than baseline for about 5 minutes as per facility RN. This seizure activity was the same as previous seizures witnessed by RN and other staff. Pt normally has seizure activity 1-2x per month. There was also incontinence with each seizure activity, but pt is normally incontinent for urine and stool.     Pt had similar seizures last month and she was hospitalized from 5/8-11. Pt was discharged with an increased keppra dose to 1000 mg BID and was also started on Depakaote 500mg BID in place of her carbamzepine. Since that time she saw her PCP around May 30th per facility RN and the PCP lowered the pt's Keppra dose from 1000 mg to 750mg due to her Keppra levels being too high and also continued her carbamazepine with depakote reportedly.     Facility RN denies any recent travel, evidence of recent infections, syncope, weight changes, night sweats, PND, vomiting, constipation, trauma or changes in other medications. (07 Jun 2018 07:46)      Past Medical History  Intellectual disability  Constipation  Seizure disorder  Cerebral palsy      Past Surgical History  No significant past surgical history      MEDICATIONS    ALBUTerol/ipratropium for Nebulization 3 milliLiter(s) Nebulizer every 6 hours PRN  AQUAPHOR (petrolatum Ointment) 1 Application(s) Topical two times a day  artificial  tears Solution 2 Drop(s) Both EYES three times a day  aspirin enteric coated 81 milliGRAM(s) Oral daily  benztropine 0.5 milliGRAM(s) Oral two times a day  buDESOnide   0.5 milliGRAM(s) Respule 0.5 milliGRAM(s) Inhalation daily  clotrimazole 1% Cream 1 Application(s) Topical two times a day  enoxaparin Injectable 40 milliGRAM(s) SubCutaneous daily  fluticasone propionate 50 MICROgram(s)/spray Nasal Spray 1 Spray(s) Both Nostrils two times a day  lacosamide IVPB 300 milliGRAM(s) IV Intermittent every 12 hours  lactulose Syrup 10 Gram(s) Oral two times a day  levETIRAcetam  IVPB 1000 milliGRAM(s) IV Intermittent every 12 hours  nystatin Powder 1 Application(s) Topical two times a day  OLANZapine 7.5 milliGRAM(s) Oral at bedtime  pantoprazole    Tablet 40 milliGRAM(s) Oral before breakfast  valproate sodium IVPB 1000 milliGRAM(s) IV Intermittent every 12 hours         Family history: No history of dementia, strokes, or seizures   FAMILY HISTORY:  No pertinent family history in first degree relatives    SOCIAL HISTORY -- No history of tobacco or alcohol use     Allergies    Topamax (Unknown)    Intolerances            Vital Signs Last 24 Hrs  T(C): 35.3 (15 Phillip 2018 08:00), Max: 36.1 (15 Phillip 2018 00:00)  T(F): 95.5 (15 Phillip 2018 08:00), Max: 97 (15 Phillip 2018 04:00)  HR: 77 (15 Phillip 2018 09:39) (72 - 94)  BP: 118/67 (15 Phillip 2018 09:00) (94/55 - 145/89)  BP(mean): 78 (15 Phillip 2018 09:00) (63 - 102)  RR: 23 (15 Phillip 2018 09:00) (13 - 34)  SpO2: 99% (15 Phillip 2018 09:39) (93% - 99%)        On Neurological Examination:    Mental Status - Patient is alert, awake,  F  Speech - non verbal                              Cranial Nerves - Extraocular muscle intact  WINSTON Facial symmetry Tongue midline, CnV1to V3 intact gross hearing intact      Motor Exam - moves UE>LE      Sensory    withdraws     GENERAL Exam:     Nontoxic , No Acute Distress   	  HEENT:  normocephalic, atraumatic  		  LUNGS:	Clear bilaterally  No Wheeze      VASCULAR: no carotid brui  	  HEART:	 Normal S1S2   No murmur RRR        	  MUSCULOSKELETAL: Normal Range of Motion  	   SKIN:      Normal   No Ecchymosis               LABS:  CBC Full  -  ( 15 Phillip 2018 03:30 )  WBC Count : 4.70 K/uL  Hemoglobin : 9.9 g/dL  Hematocrit : 32.3 %  Platelet Count - Automated : 93 K/uL  Mean Cell Volume : 106.3 fL  Mean Cell Hemoglobin : 32.6 pg  Mean Cell Hemoglobin Concentration : 30.7 %  Auto Neutrophil # : 1.80 K/uL  Auto Lymphocyte # : 1.91 K/uL  Auto Monocyte # : 0.54 K/uL  Auto Eosinophil # : 0.30 K/uL  Auto Basophil # : 0.03 K/uL  Auto Neutrophil % : 38.3 %  Auto Lymphocyte % : 40.6 %  Auto Monocyte % : 11.5 %  Auto Eosinophil % : 6.4 %  Auto Basophil % : 0.6 %      06-15    147<H>  |  111<H>  |  < 2<L>  ----------------------------<  93  3.4<L>   |  29  |  0.62    Ca    7.2<L>      15 Phillip 2018 03:30  Phos  2.7     06-14  Mg     1.7     06-14    TPro  5.4<L>  /  Alb  2.2<L>  /  TBili  < 0.2<L>  /  DBili  x   /  AST  19  /  ALT  18  /  AlkPhos  57  06-14    Hemoglobin A1C:     LIVER FUNCTIONS - ( 14 Jun 2018 02:38 )  Alb: 2.2 g/dL / Pro: 5.4 g/dL / ALK PHOS: 57 u/L / ALT: 18 u/L / AST: 19 u/L / GGT: x           Vitamin B12         RADIOLOGY    EKG                s[place]

## 2018-06-15 NOTE — PROGRESS NOTE ADULT - PROBLEM SELECTOR PLAN 1
Originally presented from [facility] home for recurrent seizures after Keppra dose was decreased as outpatient and admitted to MICU where continuous vEEG showed persistent seizure activity. Now s/p keppra, valproate, fosphenytoin, vimpat, and ativan in MICU. Fosphenytoin and ativan tapered off without seizure activity. Mental status improved since admission, but reportedly not at baseline per family.  - c/w keppra 1g Q12H, valproate 1g Q12H, and lacosamide 300 Q12H and titrate per neuro recs  - f/u valproate level  - cont to monitor for seizure activity

## 2018-06-15 NOTE — CHART NOTE - NSCHARTNOTEFT_GEN_A_CORE
MICU Transfer Note    Transfer from: MICU  Transfer to:  (X) Medicine    (  ) Telemetry    (  ) RCU    (  ) Palliative    (  ) Stroke Unit    (  ) _______________    Accepting physican:      MICU COURSE:  53F PMH cerebral palsy, seizures, nonverbal baseline, presented to [facility] from group home on 6/7/18 for recurrent seizures. In ED, had 2 witnessed seizures that resolved with Ativan and was admitted to medical service. Was previously on Keppra 1000mg BID and Depakote 500mg BID, changed to Keppra 750mg BID on 5/30/18 by PMD. While admitted, pt with 28+ episodes of brief (45sec-1min) tonic clonic seizure activity over 24 hours. Reported to have had no fevers or infectious symptoms from [facility].     Per neuro note, seizure witnessed by resident consisted of: "Head turning from towards right, ictal cry, eye deviation to right and generalized stiffening of extremities" which aid at bedside and floor RNs state is consistent with the seizures pt has been having on the floor. After recurrent seizures, neuro recommending Fosphenytoin 500BID continuation, fosphenytoin load of 1500mg, cont keppra and depakote. MICU consulted.     During MICU stay, pt initially continued to have seizures and vimpat and ativan were both added. At that time, she was on a regimen of keppra, valproate, fosphenytoin, vimpat, ativan. Never required intubation. Continuous EEG monitoring and gradual reduction of seizures with titration of seizure medications. Over the last 3-4 days, fosphenytoin and ativan were tapered off with reduction of keppra to 1000 bid from 1500. Vimpat 300 bid and valproate 1000 bid. Pt has been more awake over the last 24-48 hours, recognized her mother and smiled, and has been able to tolerate PO. Occasionally wheezing throughout MICU stay.          ASSESSMENT & PLAN:   53F with hx seizure d/o with acute worsening of seizures. Admitted to MICU for monitoring in case of need for propofol and/or versed requiring intubation.     #Neuro:  - persistent seizure activity seen on vEEG.   - neuro following.  - As patient heavily sedated, decreased keppra to 1000mg BID.  - fosphenytoin was titrated off  - will consider decreasing Vinpat dose as can cause NV prolongation. Will await EP recs.   - continue with valproate 1000 BID.     #Cardiovascular:  - episode of bradycardia to 30s, nonsustained overnight with tele showing possible 2nd degree AV block Type II.   - EP called, not heart block, ntd     #Respiratory:  - no active issues.   -Continue duonebs PRN.    #GI:  -NGT attempted today for feeding.   -Keppra decreased as above to prevent over-sedation so that patient can eat.   -c/w PPI.   -trend LFTs with seizure medications.    #Renal:  -No active issues.    #Endocrine:  -No active issues.    #ID:   - hypothermia overnight. Known side effect of valproate.  - BCx/UCx 6/9 NGTD.   - monitor off abx.    #Heme:  -No active issues.    #DVT PPX:  -Lovenox.      Supriya Crews, PGY-1 EM       For Follow-Up:  -Follow up EEG results from overnight   -Continue to titrate down seizure medications per neuro recs, ?keppra from 1000 to 750,   -Continue to ensure PO intake

## 2018-06-15 NOTE — PROGRESS NOTE ADULT - ASSESSMENT
53F with hx seizure d/o with acute worsening of seizures. Admitted to MICU for monitoring in case of need for propofol and/or versed requiring intubation.     #Neuro:  - persistent seizure activity seen on vEEG.   - neuro following.  - As patient heavily sedated, decreased keppra to 1000mg BID.  - fosphenytoin was titrated off  - will consider decreasing Vinpat dose as can cause GA prolongation. Will await EP recs.   - continue with valproate 1000 BID.     #Cardiovascular:  - episode of bradycardia to 30s, nonsustained overnight with tele showing possible 2nd degree AV block Type II.   - EP called, not heart block, ntd     #Respiratory:  - no active issues.   -Continue duonebs PRN.    #GI:  -NGT attempted today for feeding.   -Keppra decreased as above to prevent over-sedation so that patient can eat.   -c/w PPI.   -trend LFTs with seizure medications.    #Renal:  -No active issues.    #Endocrine:  -No active issues.    #ID:   - hypothermia overnight. Known side effect of valproate.  - BCx/UCx 6/9 NGTD.   - monitor off abx.    #Heme:  -No active issues.    #DVT PPX:  -Lovenox.      Supriya Crews, PGY-1 EM

## 2018-06-15 NOTE — EEG REPORT - NS EEG TEXT BOX
MEDICATIONS  (PERTINENT):  lacosamide IVPB 300 milliGRAM(s) IV Intermittent every 12 hours  levETIRAcetam  IVPB 1000 milliGRAM(s) IV Intermittent every 12 hours  valproate sodium IVPB 1000 milliGRAM(s) IV Intermittent every 12 hours    Study Date: 06-14-18 - 6-15-18    --------------------------------------------------------------------------------------------------  History:  JITENDRA/ FRANCISCO Patient is a 53y old  Female who presents with a chief complaint of seizures (07 Jun 2018 15:19)      --------------------------------------------------------------------------------------------------  Study Interpretation:    FINDINGS:  The background was continuous, variable and reactive. It consisted of continuous polymorphic theta/delta frequencies with occasional epochs (<10sec) of frontally predominant GRDA, at times sharply contoured. There was no PDR.     Sleep Background:  Drowsiness was characterized by fragmentation, attenuation, and slowing of the background activity.    Stage II sleep transients were not recorded.    Epileptiform Activity:   No epileptiform discharges were present.    Events:  No clinical events were recorded.  No seizures were recorded.    Activation Procedures:   -Hyperventilation was not performed.    -Photic stimulation was not performed.    Artifacts:  Intermittent myogenic and movement artifacts were noted.    ECG:  The heart rate on single channel ECG was predominantly between 60-80BPM.  -----------------------------------------------------------------------------------------------------    EEG Classification / Summary:  Abnormal EEG in awake, drowsy, and asleep states  -Continuous slowing, generalized (delta and theta)     Clinical Impression:  Moderate nonspecific diffuse or multifocal cerebral dysfunction. There were no epileptiform abnormalities recorded.

## 2018-06-16 LAB
ALBUMIN SERPL ELPH-MCNC: 2.3 G/DL — LOW (ref 3.3–5)
ALP SERPL-CCNC: 69 U/L — SIGNIFICANT CHANGE UP (ref 40–120)
ALT FLD-CCNC: 14 U/L — SIGNIFICANT CHANGE UP (ref 4–33)
AST SERPL-CCNC: 19 U/L — SIGNIFICANT CHANGE UP (ref 4–32)
B PERT DNA SPEC QL NAA+PROBE: SIGNIFICANT CHANGE UP
BASOPHILS # BLD AUTO: 0.03 K/UL — SIGNIFICANT CHANGE UP (ref 0–0.2)
BASOPHILS NFR BLD AUTO: 0.5 % — SIGNIFICANT CHANGE UP (ref 0–2)
BILIRUB SERPL-MCNC: < 0.2 MG/DL — LOW (ref 0.2–1.2)
BUN SERPL-MCNC: 2 MG/DL — LOW (ref 7–23)
C PNEUM DNA SPEC QL NAA+PROBE: NOT DETECTED — SIGNIFICANT CHANGE UP
CALCIUM SERPL-MCNC: 7.7 MG/DL — LOW (ref 8.4–10.5)
CHLORIDE SERPL-SCNC: 109 MMOL/L — HIGH (ref 98–107)
CO2 SERPL-SCNC: 29 MMOL/L — SIGNIFICANT CHANGE UP (ref 22–31)
CREAT SERPL-MCNC: 0.51 MG/DL — SIGNIFICANT CHANGE UP (ref 0.5–1.3)
EOSINOPHIL # BLD AUTO: 0.44 K/UL — SIGNIFICANT CHANGE UP (ref 0–0.5)
EOSINOPHIL NFR BLD AUTO: 8 % — HIGH (ref 0–6)
FLUAV H1 2009 PAND RNA SPEC QL NAA+PROBE: NOT DETECTED — SIGNIFICANT CHANGE UP
FLUAV H1 RNA SPEC QL NAA+PROBE: NOT DETECTED — SIGNIFICANT CHANGE UP
FLUAV H3 RNA SPEC QL NAA+PROBE: NOT DETECTED — SIGNIFICANT CHANGE UP
FLUAV SUBTYP SPEC NAA+PROBE: SIGNIFICANT CHANGE UP
FLUBV RNA SPEC QL NAA+PROBE: NOT DETECTED — SIGNIFICANT CHANGE UP
GLUCOSE SERPL-MCNC: 85 MG/DL — SIGNIFICANT CHANGE UP (ref 70–99)
HADV DNA SPEC QL NAA+PROBE: NOT DETECTED — SIGNIFICANT CHANGE UP
HCOV 229E RNA SPEC QL NAA+PROBE: NOT DETECTED — SIGNIFICANT CHANGE UP
HCOV HKU1 RNA SPEC QL NAA+PROBE: NOT DETECTED — SIGNIFICANT CHANGE UP
HCOV NL63 RNA SPEC QL NAA+PROBE: NOT DETECTED — SIGNIFICANT CHANGE UP
HCOV OC43 RNA SPEC QL NAA+PROBE: NOT DETECTED — SIGNIFICANT CHANGE UP
HCT VFR BLD CALC: 33.9 % — LOW (ref 34.5–45)
HGB BLD-MCNC: 10.8 G/DL — LOW (ref 11.5–15.5)
HMPV RNA SPEC QL NAA+PROBE: NOT DETECTED — SIGNIFICANT CHANGE UP
HPIV1 RNA SPEC QL NAA+PROBE: NOT DETECTED — SIGNIFICANT CHANGE UP
HPIV2 RNA SPEC QL NAA+PROBE: NOT DETECTED — SIGNIFICANT CHANGE UP
HPIV3 RNA SPEC QL NAA+PROBE: NOT DETECTED — SIGNIFICANT CHANGE UP
HPIV4 RNA SPEC QL NAA+PROBE: NOT DETECTED — SIGNIFICANT CHANGE UP
IMM GRANULOCYTES # BLD AUTO: 0.14 # — SIGNIFICANT CHANGE UP
IMM GRANULOCYTES NFR BLD AUTO: 2.6 % — HIGH (ref 0–1.5)
LYMPHOCYTES # BLD AUTO: 1.56 K/UL — SIGNIFICANT CHANGE UP (ref 1–3.3)
LYMPHOCYTES # BLD AUTO: 28.4 % — SIGNIFICANT CHANGE UP (ref 13–44)
M PNEUMO DNA SPEC QL NAA+PROBE: NOT DETECTED — SIGNIFICANT CHANGE UP
MAGNESIUM SERPL-MCNC: 1.9 MG/DL — SIGNIFICANT CHANGE UP (ref 1.6–2.6)
MCHC RBC-ENTMCNC: 31.9 % — LOW (ref 32–36)
MCHC RBC-ENTMCNC: 32.2 PG — SIGNIFICANT CHANGE UP (ref 27–34)
MCV RBC AUTO: 101.2 FL — HIGH (ref 80–100)
MONOCYTES # BLD AUTO: 0.64 K/UL — SIGNIFICANT CHANGE UP (ref 0–0.9)
MONOCYTES NFR BLD AUTO: 11.7 % — SIGNIFICANT CHANGE UP (ref 2–14)
NEUTROPHILS # BLD AUTO: 2.68 K/UL — SIGNIFICANT CHANGE UP (ref 1.8–7.4)
NEUTROPHILS NFR BLD AUTO: 48.8 % — SIGNIFICANT CHANGE UP (ref 43–77)
NRBC # FLD: 0.02 — SIGNIFICANT CHANGE UP
PHOSPHATE SERPL-MCNC: 2.4 MG/DL — LOW (ref 2.5–4.5)
PLATELET # BLD AUTO: 103 K/UL — LOW (ref 150–400)
PMV BLD: 9.8 FL — SIGNIFICANT CHANGE UP (ref 7–13)
POTASSIUM SERPL-MCNC: 4.1 MMOL/L — SIGNIFICANT CHANGE UP (ref 3.5–5.3)
POTASSIUM SERPL-SCNC: 4.1 MMOL/L — SIGNIFICANT CHANGE UP (ref 3.5–5.3)
PROT SERPL-MCNC: 5.8 G/DL — LOW (ref 6–8.3)
RBC # BLD: 3.35 M/UL — LOW (ref 3.8–5.2)
RBC # FLD: 14.6 % — HIGH (ref 10.3–14.5)
RSV RNA SPEC QL NAA+PROBE: NOT DETECTED — SIGNIFICANT CHANGE UP
RV+EV RNA SPEC QL NAA+PROBE: NOT DETECTED — SIGNIFICANT CHANGE UP
SODIUM SERPL-SCNC: 146 MMOL/L — HIGH (ref 135–145)
WBC # BLD: 5.49 K/UL — SIGNIFICANT CHANGE UP (ref 3.8–10.5)
WBC # FLD AUTO: 5.49 K/UL — SIGNIFICANT CHANGE UP (ref 3.8–10.5)

## 2018-06-16 PROCEDURE — 71045 X-RAY EXAM CHEST 1 VIEW: CPT | Mod: 26

## 2018-06-16 RX ORDER — IPRATROPIUM/ALBUTEROL SULFATE 18-103MCG
3 AEROSOL WITH ADAPTER (GRAM) INHALATION ONCE
Refills: 0 | Status: COMPLETED | OUTPATIENT
Start: 2018-06-16 | End: 2018-06-16

## 2018-06-16 RX ORDER — TIOTROPIUM BROMIDE 18 UG/1
1 CAPSULE ORAL; RESPIRATORY (INHALATION) DAILY
Refills: 0 | Status: DISCONTINUED | OUTPATIENT
Start: 2018-06-16 | End: 2018-06-23

## 2018-06-16 RX ORDER — ALBUTEROL 90 UG/1
1 AEROSOL, METERED ORAL EVERY 4 HOURS
Refills: 0 | Status: DISCONTINUED | OUTPATIENT
Start: 2018-06-16 | End: 2018-06-23

## 2018-06-16 RX ORDER — IPRATROPIUM/ALBUTEROL SULFATE 18-103MCG
3 AEROSOL WITH ADAPTER (GRAM) INHALATION EVERY 6 HOURS
Refills: 0 | Status: DISCONTINUED | OUTPATIENT
Start: 2018-06-16 | End: 2018-06-23

## 2018-06-16 RX ADMIN — LACOSAMIDE 160 MILLIGRAM(S): 50 TABLET ORAL at 00:01

## 2018-06-16 RX ADMIN — NYSTATIN CREAM 1 APPLICATION(S): 100000 CREAM TOPICAL at 17:05

## 2018-06-16 RX ADMIN — Medication 3 MILLILITER(S): at 10:29

## 2018-06-16 RX ADMIN — Medication 1 APPLICATION(S): at 05:38

## 2018-06-16 RX ADMIN — Medication 3 MILLILITER(S): at 15:42

## 2018-06-16 RX ADMIN — Medication 1 APPLICATION(S): at 17:06

## 2018-06-16 RX ADMIN — Medication 1 SPRAY(S): at 05:38

## 2018-06-16 RX ADMIN — Medication 30 MILLIGRAM(S): at 17:06

## 2018-06-16 RX ADMIN — Medication 1 APPLICATION(S): at 05:40

## 2018-06-16 RX ADMIN — ENOXAPARIN SODIUM 40 MILLIGRAM(S): 100 INJECTION SUBCUTANEOUS at 14:36

## 2018-06-16 RX ADMIN — LACTULOSE 10 GRAM(S): 10 SOLUTION ORAL at 05:39

## 2018-06-16 RX ADMIN — LACOSAMIDE 160 MILLIGRAM(S): 50 TABLET ORAL at 16:22

## 2018-06-16 RX ADMIN — LEVETIRACETAM 400 MILLIGRAM(S): 250 TABLET, FILM COATED ORAL at 14:35

## 2018-06-16 RX ADMIN — Medication 3 MILLILITER(S): at 22:30

## 2018-06-16 RX ADMIN — Medication 1 APPLICATION(S): at 17:05

## 2018-06-16 RX ADMIN — Medication 2 DROP(S): at 22:55

## 2018-06-16 RX ADMIN — Medication 0.5 MILLIGRAM(S): at 05:39

## 2018-06-16 RX ADMIN — Medication 2 DROP(S): at 14:35

## 2018-06-16 RX ADMIN — Medication 1 SPRAY(S): at 17:05

## 2018-06-16 RX ADMIN — NYSTATIN CREAM 1 APPLICATION(S): 100000 CREAM TOPICAL at 05:39

## 2018-06-16 RX ADMIN — Medication 2 DROP(S): at 05:37

## 2018-06-16 RX ADMIN — Medication 0.5 MILLIGRAM(S): at 10:30

## 2018-06-16 RX ADMIN — Medication 30 MILLIGRAM(S): at 05:37

## 2018-06-16 RX ADMIN — Medication 0.5 MILLIGRAM(S): at 17:06

## 2018-06-16 RX ADMIN — Medication 1 MILLIGRAM(S): at 11:47

## 2018-06-16 RX ADMIN — LACTULOSE 10 GRAM(S): 10 SOLUTION ORAL at 17:04

## 2018-06-16 NOTE — PROGRESS NOTE ADULT - SUBJECTIVE AND OBJECTIVE BOX
pt appears comfortable calm  no signs of acute distress, non verbal       MEDICATIONS  (STANDING):  ALBUTerol    90 MICROgram(s) HFA Inhaler 1 Puff(s) Inhalation every 4 hours  ALBUTerol/ipratropium for Nebulization 3 milliLiter(s) Nebulizer every 6 hours  AQUAPHOR (petrolatum Ointment) 1 Application(s) Topical two times a day  artificial  tears Solution 2 Drop(s) Both EYES three times a day  aspirin enteric coated 81 milliGRAM(s) Oral daily  benztropine 0.5 milliGRAM(s) Oral two times a day  buDESOnide   0.5 milliGRAM(s) Respule 0.5 milliGRAM(s) Inhalation daily  clotrimazole 1% Cream 1 Application(s) Topical two times a day  enoxaparin Injectable 40 milliGRAM(s) SubCutaneous daily  fluticasone propionate 50 MICROgram(s)/spray Nasal Spray 1 Spray(s) Both Nostrils two times a day  lacosamide IVPB 300 milliGRAM(s) IV Intermittent every 12 hours  lactulose Syrup 10 Gram(s) Oral two times a day  levETIRAcetam  IVPB 1000 milliGRAM(s) IV Intermittent every 12 hours  nystatin Powder 1 Application(s) Topical two times a day  OLANZapine 7.5 milliGRAM(s) Oral at bedtime  pantoprazole    Tablet 40 milliGRAM(s) Oral before breakfast  tiotropium 18 MICROgram(s) Capsule 1 Capsule(s) Inhalation daily  valproate sodium IVPB 1000 milliGRAM(s) IV Intermittent every 12 hours    MEDICATIONS  (PRN):      LABS:                        9.9    4.70  )-----------( 93       ( 15 Phillip 2018 03:30 )             32.3     Hemoglobin: 9.9 g/dL (06-15 @ 03:30)  Hemoglobin: 10.2 g/dL (06-14 @ 02:38)  Hemoglobin: 10.7 g/dL (06-13 @ 02:30)  Hemoglobin: 11.1 g/dL (06-12 @ 03:25)    06-15    147<H>  |  111<H>  |  < 2<L>  ----------------------------<  93  3.4<L>   |  29  |  0.62    Ca    7.2<L>      15 Phillip 2018 03:30      Creatinine Trend: 0.62<--, 0.53<--, 0.47<--, 0.54<--, 0.61<--, 0.58<--           PHYSICAL EXAM  Vital Signs Last 24 Hrs  T(C): 36.6 (15 Phillip 2018 21:58), Max: 36.6 (15 Phillip 2018 21:58)  T(F): 97.9 (15 Phillip 2018 21:58), Max: 97.9 (15 Phillip 2018 21:58)  HR: 88 (16 Jun 2018 10:32) (62 - 95)  BP: 145/74 (16 Jun 2018 06:30) (140/78 - 163/61)  BP(mean): --  RR: 19 (16 Jun 2018 06:30) (17 - 20)  SpO2: 96% (16 Jun 2018 10:32) (94% - 98%)      Cardiovascular: Normal S1 S2, RRR  No JVD, 1/6 DIAZ murmur,  Respiratory: A/L  Lungs clear to auscultation, normal effort 	  Gastrointestinal:  Soft, Non-tender, + BS	  Extremities no edema, cyanosis, clubbing B/L LE's     TELEMETRY: SR, hx NSVT, APCS, BLocked APCS, Wenkebach on 6/12  	    RADIOLOGY:     < from: Xray Chest 1 View- PORTABLE-Urgent (06.06.18 @ 18:48) >  IMPRESSION:  Chin overlie and obscures portion of lung apices.    Uniform hazy opacity in peripheral lower left hemithorax obscuring   lateral left hemidiaphragm CP angle and left heart border related to   prominent epicardial fat as demonstrated on chestCT from 5/9/2018.     Sharp right CP angle. Clear remaining visualized lungs. No pneumothorax.    Heart size and mediastinal width inaccurately assessed on this projection.    Spinal degenerative changes again noted.    < end of copied text >    < from: CT Head No Cont (06.07.18 @ 02:40) >  Impression:     No acute intracranial hemorrhage, large cortical infarct or mass effect.   No significant interval change since 5/8/2018. If clinically indicated,   short-term follow-up or MRI may be obtained for further evaluation.    < end of copied text >  	  EEG   EEG Summary/Classification:  Abnormal EEG in awake state   - 	Total of 18 seizures captured: 9 electro-clinical seizures with right hemispheric onset (clinical semiology unclear), 3 electrographic seizures with right hemispheric onset and 6 electrographic seizures with left hemispheric onset.  -          Moderate background slowing     EEG Impression/Clinical Correlate:  1.	There were 18 electroclinical and subclinical seizures captured over the span of ~3hrs: 12 from the right hemisphere and 6 with onset in the left hemisphere.   2.	There is evidence of moderate nonspecific diffuse or multifocal cerebral dysfunction      ASSESSMENT/PLAN: 	54 y/o female with PmHx of cerebral palsy, intellectual disability, seizures, non-verbal at baseline, presented to [facility] ED after 4 seizures in less than 24 hours.  Cardiology consulted for abnormal EKG     --The patient has ruled out for acute coronary syndrome with negative serial cardiac enzymes  --EKG with NSR narrow QRS without acute ischemia   --Check TTE to assess LV function    --orthostatics negative  --tele events noted, avoid AVN blockers    --EP eval noted  --hypokalemic  [3.5] yesterday       F/U AM labs

## 2018-06-16 NOTE — SWALLOW BEDSIDE ASSESSMENT ADULT - ASR SWALLOW ASPIRATION MONITOR
change of breathing pattern/position upright (90Y)/cough/gurgly voice/fever/pneumonia/throat clearing/upper respiratory infection

## 2018-06-16 NOTE — PROGRESS NOTE ADULT - PROBLEM SELECTOR PLAN 1
Originally presented from [facility] home for recurrent seizures after Keppra dose was decreased as outpatient and admitted to MICU where continuous vEEG showed persistent seizure activity. Now s/p keppra, valproate, fosphenytoin, vimpat, and ativan in MICU. Fosphenytoin and ativan tapered off without seizure activity. Mental status improved since admission, but reportedly not at baseline per family.  - c/w keppra 1g Q12H, valproate 1g Q12H, and lacosamide 300 Q12H and titrate per neuro recs  - f/u valproate level  - cont to monitor for seizure activity

## 2018-06-16 NOTE — PROGRESS NOTE ADULT - SUBJECTIVE AND OBJECTIVE BOX
PROVIDER CONTACT INFO:  MD PILAR Brady Pager: [phone]  [place] Pager: [phone]    NAYANA JACQUES  53y  Female      Patient is a 53y old  Female who presents with a chief complaint of seizures (07 Jun 2018 15:19)      INTERVAL HPI/OVERNIGHT EVENTS: Overnight pt ripped out IV and required ativan for agitation and IV placement. Pt hypothermic this AM, repeat blood cx sent, CXR ordered    T(C): 36.6 (06-15-18 @ 21:58), Max: 36.6 (06-15-18 @ 21:58)  HR: 95 (06-16-18 @ 06:30) (62 - 95)  BP: 145/74 (06-16-18 @ 06:30) (118/67 - 163/61)  RR: 19 (06-16-18 @ 06:30) (17 - 24)  SpO2: 94% (06-16-18 @ 06:30) (94% - 99%)  Wt(kg): --Vital Signs Last 24 Hrs  T(C): 36.6 (15 Phillip 2018 21:58), Max: 36.6 (15 Phillip 2018 21:58)  T(F): 97.9 (15 Phillip 2018 21:58), Max: 97.9 (15 Phillip 2018 21:58)  HR: 95 (16 Jun 2018 06:30) (62 - 95)  BP: 145/74 (16 Jun 2018 06:30) (118/67 - 163/61)  BP(mean): 99 (15 Phillip 2018 10:00) (78 - 102)  RR: 19 (16 Jun 2018 06:30) (17 - 24)  SpO2: 94% (16 Jun 2018 06:30) (94% - 99%)    PHYSICAL EXAM:  GENERAL: NAD, well-groomed, well-developed  HEAD:  Atraumatic, Normocephalic  EYES: EOMI, PERRLA, conjunctiva and sclera clear  ENMT: No tonsillar erythema, exudates,; Moist mucous membranes. No lesions  NECK: Supple, No JVD, Normal thyroid  CHEST/LUNG: Clear to percussion bilaterally; No rales, rhonchi, wheezing, or rubs  HEART: Regular rate and rhythm; No murmurs, rubs, or gallops  ABDOMEN: Soft, Nontender, Nondistended; Bowel sounds present.  No hepatosplenomegaly  EXTREMITIES:  2+ Peripheral Pulses, No clubbing, cyanosis, or edema  LYMPH: No lymphadenopathy noted  SKIN: No rashes or lesions  PSYCH: Alert & Oriented x3  NERVOUS SYSTEM:  ; Motor Strength 5/5 B/L upper and lower extremities.  Sensory intact    Consultant(s) Notes Reviewed:  [x ] YES  [ ] NO  Care Discussed with Consultants/Other Providers [ x] YES  [ ] NO    LABS:                        9.9    4.70  )-----------( 93       ( 15 Phillip 2018 03:30 )             32.3     06-15    147<H>  |  111<H>  |  < 2<L>  ----------------------------<  93  3.4<L>   |  29  |  0.62    Ca    7.2<L>      15 Phillip 2018 03:30          CAPILLARY BLOOD GLUCOSE                RADIOLOGY & ADDITIONAL TESTS:    Imaging Personally Reviewed:  [ ] YES  [ ] NO

## 2018-06-16 NOTE — CHART NOTE - NSCHARTNOTEFT_GEN_A_CORE
Called by CARON Staley - pt removed PIV, due for IV seizure medications.     53F with cerebral palsy, tx to floors after MICU stay for concern for status epilepticus.     Pt. seen and assessed at bedside. CARON Staley attempted to place IV, however pt. agitated and combative. Administered 1mg IM ativan, IV placed by CARON Yadav (IV team).     NOEL Boyd PGY-1

## 2018-06-16 NOTE — PROGRESS NOTE ADULT - ASSESSMENT
53y old female with CP, seizure disorder presents with status epilepticus in the setting of recent decrease in anti-epileptic medication, now s/p MICU stay. Now without seizure activity, but with multiple episodes of hypothermia concerning for occult infection.

## 2018-06-16 NOTE — PROGRESS NOTE ADULT - SUBJECTIVE AND OBJECTIVE BOX
Neurology Progress    NAYANA JACQUES53yFemale    HPI:  54 y/o female with PmHx of cerebral palsy, intellectual disability, seizures and is non-verbal presented to [facility] ED after 4 seizures in less than 24 hours. Seizures occurred yesterday at ~0200, ~0700, ~1200 and today at ~0500. With each seizure the pt was described as being more confused than baseline for about 5 minutes as per facility RN. This seizure activity was the same as previous seizures witnessed by RN and other staff. Pt normally has seizure activity 1-2x per month. There was also incontinence with each seizure activity, but pt is normally incontinent for urine and stool.     Pt had similar seizures last month and she was hospitalized from 5/8-11. Pt was discharged with an increased keppra dose to 1000 mg BID and was also started on Depakaote 500mg BID in place of her carbamzepine. Since that time she saw her PCP around May 30th per facility RN and the PCP lowered the pt's Keppra dose from 1000 mg to 750mg due to her Keppra levels being too high and also continued her carbamazepine with depakote reportedly.     Facility RN denies any recent travel, evidence of recent infections, syncope, weight changes, night sweats, PND, vomiting, constipation, trauma or changes in other medications. (07 Jun 2018 07:46)      Past Medical History  Intellectual disability  Constipation  Seizure disorder  Cerebral palsy      Past Surgical History  No significant past surgical history      MEDICATIONS    ALBUTerol    90 MICROgram(s) HFA Inhaler 1 Puff(s) Inhalation every 4 hours  ALBUTerol/ipratropium for Nebulization 3 milliLiter(s) Nebulizer every 6 hours  AQUAPHOR (petrolatum Ointment) 1 Application(s) Topical two times a day  artificial  tears Solution 2 Drop(s) Both EYES three times a day  aspirin enteric coated 81 milliGRAM(s) Oral daily  benztropine 0.5 milliGRAM(s) Oral two times a day  buDESOnide   0.5 milliGRAM(s) Respule 0.5 milliGRAM(s) Inhalation daily  clotrimazole 1% Cream 1 Application(s) Topical two times a day  enoxaparin Injectable 40 milliGRAM(s) SubCutaneous daily  fluticasone propionate 50 MICROgram(s)/spray Nasal Spray 1 Spray(s) Both Nostrils two times a day  lacosamide IVPB 300 milliGRAM(s) IV Intermittent every 12 hours  lactulose Syrup 10 Gram(s) Oral two times a day  levETIRAcetam  IVPB 1000 milliGRAM(s) IV Intermittent every 12 hours  nystatin Powder 1 Application(s) Topical two times a day  OLANZapine 7.5 milliGRAM(s) Oral at bedtime  pantoprazole    Tablet 40 milliGRAM(s) Oral before breakfast  tiotropium 18 MICROgram(s) Capsule 1 Capsule(s) Inhalation daily  valproate sodium IVPB 1000 milliGRAM(s) IV Intermittent every 12 hours         Family history: No history of dementia, strokes, or seizures   FAMILY HISTORY:  No pertinent family history in first degree relatives    SOCIAL HISTORY -- No history of tobacco or alcohol use     Allergies    Topamax (Unknown)    Intolerances            Vital Signs Last 24 Hrs  T(C): 36.6 (15 Phillip 2018 21:58), Max: 36.6 (15 Phillip 2018 21:58)  T(F): 97.9 (15 Phillip 2018 21:58), Max: 97.9 (15 Phillip 2018 21:58)  HR: 95 (16 Jun 2018 06:30) (62 - 95)  BP: 145/74 (16 Jun 2018 06:30) (140/78 - 163/61)  BP(mean): 99 (15 Phillip 2018 10:00) (99 - 99)  RR: 19 (16 Jun 2018 06:30) (17 - 23)  SpO2: 94% (16 Jun 2018 06:30) (94% - 98%)        On Neurological Examination:    Mental Status - Patient is alert, awake, oriented X3. .   Follows commands well and able to answer questions appropriately. Mood and affect  normal  Follow simple commands able to repeat  able to name.  Speech - Fluent no Dysarthria  no  Aphasia                              Cranial Nerves - Extraocular muscle intact  WINSTON Facial symmetry Tongue midline, CnV1to V3 intact gross hearing intact      Motor Exam -   Right upper  5/5 throughout  Left upper 5/5 throughtout  Right lower- 5/5 throughout  Left lower 5/5 throughout  Coordination -finger to nose intact  Muscle tone - is normal all over. No asymmetry is seen.      Sensory    Bilateral intact to light touch    Gait -  normal  no ataxia     GENERAL Exam:     Nontoxic , No Acute Distress   	  HEENT:  normocephalic, atraumatic  		  LUNGS:	Clear bilaterally  No Wheeze      VASCULAR: no carotid brui  	  HEART:	 Normal S1S2   No murmur RRR        	  MUSCULOSKELETAL: Normal Range of Motion  	   SKIN:      Normal   No Ecchymosis               LABS:  CBC Full  -  ( 15 Phillip 2018 03:30 )  WBC Count : 4.70 K/uL  Hemoglobin : 9.9 g/dL  Hematocrit : 32.3 %  Platelet Count - Automated : 93 K/uL  Mean Cell Volume : 106.3 fL  Mean Cell Hemoglobin : 32.6 pg  Mean Cell Hemoglobin Concentration : 30.7 %  Auto Neutrophil # : 1.80 K/uL  Auto Lymphocyte # : 1.91 K/uL  Auto Monocyte # : 0.54 K/uL  Auto Eosinophil # : 0.30 K/uL  Auto Basophil # : 0.03 K/uL  Auto Neutrophil % : 38.3 %  Auto Lymphocyte % : 40.6 %  Auto Monocyte % : 11.5 %  Auto Eosinophil % : 6.4 %  Auto Basophil % : 0.6 %      06-15    147<H>  |  111<H>  |  < 2<L>  ----------------------------<  93  3.4<L>   |  29  |  0.62    Ca    7.2<L>      15 Phillip 2018 03:30      Hemoglobin A1C:       Vitamin B12         RADIOLOGY    EKG                schoenberg

## 2018-06-17 ENCOUNTER — TRANSCRIPTION ENCOUNTER (OUTPATIENT)
Age: 53
End: 2018-06-17

## 2018-06-17 LAB
BUN SERPL-MCNC: 3 MG/DL — LOW (ref 7–23)
CALCIUM SERPL-MCNC: 8.5 MG/DL — SIGNIFICANT CHANGE UP (ref 8.4–10.5)
CHLORIDE SERPL-SCNC: 104 MMOL/L — SIGNIFICANT CHANGE UP (ref 98–107)
CO2 SERPL-SCNC: 29 MMOL/L — SIGNIFICANT CHANGE UP (ref 22–31)
CREAT SERPL-MCNC: 0.54 MG/DL — SIGNIFICANT CHANGE UP (ref 0.5–1.3)
GLUCOSE SERPL-MCNC: 74 MG/DL — SIGNIFICANT CHANGE UP (ref 70–99)
HCT VFR BLD CALC: 36.6 % — SIGNIFICANT CHANGE UP (ref 34.5–45)
HGB BLD-MCNC: 11.5 G/DL — SIGNIFICANT CHANGE UP (ref 11.5–15.5)
MAGNESIUM SERPL-MCNC: 1.9 MG/DL — SIGNIFICANT CHANGE UP (ref 1.6–2.6)
MCHC RBC-ENTMCNC: 31.4 % — LOW (ref 32–36)
MCHC RBC-ENTMCNC: 33.2 PG — SIGNIFICANT CHANGE UP (ref 27–34)
MCV RBC AUTO: 105.8 FL — HIGH (ref 80–100)
NRBC # FLD: 0 — SIGNIFICANT CHANGE UP
PHOSPHATE SERPL-MCNC: 3.4 MG/DL — SIGNIFICANT CHANGE UP (ref 2.5–4.5)
PLATELET # BLD AUTO: 106 K/UL — LOW (ref 150–400)
PMV BLD: 10.2 FL — SIGNIFICANT CHANGE UP (ref 7–13)
POTASSIUM SERPL-MCNC: 4.1 MMOL/L — SIGNIFICANT CHANGE UP (ref 3.5–5.3)
POTASSIUM SERPL-SCNC: 4.1 MMOL/L — SIGNIFICANT CHANGE UP (ref 3.5–5.3)
RBC # BLD: 3.46 M/UL — LOW (ref 3.8–5.2)
RBC # FLD: 14.6 % — HIGH (ref 10.3–14.5)
SODIUM SERPL-SCNC: 145 MMOL/L — SIGNIFICANT CHANGE UP (ref 135–145)
SPECIMEN SOURCE: SIGNIFICANT CHANGE UP
VALPROATE SERPL-MCNC: 32.8 UG/ML — LOW (ref 50–100)
WBC # BLD: 5.23 K/UL — SIGNIFICANT CHANGE UP (ref 3.8–10.5)
WBC # FLD AUTO: 5.23 K/UL — SIGNIFICANT CHANGE UP (ref 3.8–10.5)

## 2018-06-17 RX ORDER — VALPROIC ACID (AS SODIUM SALT) 250 MG/5ML
1000 SOLUTION, ORAL ORAL ONCE
Refills: 0 | Status: COMPLETED | OUTPATIENT
Start: 2018-06-17 | End: 2018-06-17

## 2018-06-17 RX ORDER — VALPROIC ACID (AS SODIUM SALT) 250 MG/5ML
1000 SOLUTION, ORAL ORAL
Refills: 0 | Status: DISCONTINUED | OUTPATIENT
Start: 2018-06-17 | End: 2018-06-18

## 2018-06-17 RX ORDER — VALPROIC ACID (AS SODIUM SALT) 250 MG/5ML
1250 SOLUTION, ORAL ORAL
Refills: 0 | Status: DISCONTINUED | OUTPATIENT
Start: 2018-06-17 | End: 2018-06-17

## 2018-06-17 RX ADMIN — ENOXAPARIN SODIUM 40 MILLIGRAM(S): 100 INJECTION SUBCUTANEOUS at 12:28

## 2018-06-17 RX ADMIN — Medication 2 DROP(S): at 12:28

## 2018-06-17 RX ADMIN — Medication 2 DROP(S): at 06:42

## 2018-06-17 RX ADMIN — PANTOPRAZOLE SODIUM 40 MILLIGRAM(S): 20 TABLET, DELAYED RELEASE ORAL at 12:28

## 2018-06-17 RX ADMIN — Medication 1 APPLICATION(S): at 17:35

## 2018-06-17 RX ADMIN — LEVETIRACETAM 400 MILLIGRAM(S): 250 TABLET, FILM COATED ORAL at 12:28

## 2018-06-17 RX ADMIN — Medication 1 APPLICATION(S): at 17:36

## 2018-06-17 RX ADMIN — Medication 3 MILLILITER(S): at 21:38

## 2018-06-17 RX ADMIN — LACTULOSE 10 GRAM(S): 10 SOLUTION ORAL at 06:44

## 2018-06-17 RX ADMIN — NYSTATIN CREAM 1 APPLICATION(S): 100000 CREAM TOPICAL at 17:36

## 2018-06-17 RX ADMIN — Medication 1 APPLICATION(S): at 06:43

## 2018-06-17 RX ADMIN — Medication 1 SPRAY(S): at 06:43

## 2018-06-17 RX ADMIN — NYSTATIN CREAM 1 APPLICATION(S): 100000 CREAM TOPICAL at 06:43

## 2018-06-17 RX ADMIN — Medication 30 MILLIGRAM(S): at 12:28

## 2018-06-17 RX ADMIN — Medication 1 APPLICATION(S): at 06:42

## 2018-06-17 RX ADMIN — Medication 3 MILLILITER(S): at 10:01

## 2018-06-17 RX ADMIN — Medication 2 DROP(S): at 22:00

## 2018-06-17 RX ADMIN — PANTOPRAZOLE SODIUM 40 MILLIGRAM(S): 20 TABLET, DELAYED RELEASE ORAL at 06:42

## 2018-06-17 RX ADMIN — Medication 0.5 MILLIGRAM(S): at 10:07

## 2018-06-17 RX ADMIN — Medication 0.5 MILLIGRAM(S): at 06:42

## 2018-06-17 RX ADMIN — OLANZAPINE 7.5 MILLIGRAM(S): 15 TABLET, FILM COATED ORAL at 22:00

## 2018-06-17 RX ADMIN — Medication 1 SPRAY(S): at 17:35

## 2018-06-17 RX ADMIN — LEVETIRACETAM 400 MILLIGRAM(S): 250 TABLET, FILM COATED ORAL at 00:50

## 2018-06-17 RX ADMIN — Medication 3 MILLILITER(S): at 04:37

## 2018-06-17 RX ADMIN — Medication 0.5 MILLIGRAM(S): at 17:36

## 2018-06-17 RX ADMIN — Medication 3 MILLILITER(S): at 16:26

## 2018-06-17 RX ADMIN — Medication 81 MILLIGRAM(S): at 12:28

## 2018-06-17 RX ADMIN — LACTULOSE 10 GRAM(S): 10 SOLUTION ORAL at 17:36

## 2018-06-17 NOTE — PROGRESS NOTE ADULT - ASSESSMENT
This is a female with status resolved.  Staff denies any seizures     continue seizure medications This is a female with status resolved.  Staff denies any seizures     continue seizure medications    decrease depakote to 1000 BID

## 2018-06-17 NOTE — PROGRESS NOTE ADULT - SUBJECTIVE AND OBJECTIVE BOX
pt appears comfortable calm  no signs of acute distress, non verbal       MEDICATIONS  (STANDING):  ALBUTerol    90 MICROgram(s) HFA Inhaler 1 Puff(s) Inhalation every 4 hours  ALBUTerol/ipratropium for Nebulization 3 milliLiter(s) Nebulizer every 6 hours  AQUAPHOR (petrolatum Ointment) 1 Application(s) Topical two times a day  artificial  tears Solution 2 Drop(s) Both EYES three times a day  aspirin enteric coated 81 milliGRAM(s) Oral daily  benztropine 0.5 milliGRAM(s) Oral two times a day  buDESOnide   0.5 milliGRAM(s) Respule 0.5 milliGRAM(s) Inhalation daily  clotrimazole 1% Cream 1 Application(s) Topical two times a day  enoxaparin Injectable 40 milliGRAM(s) SubCutaneous daily  fluticasone propionate 50 MICROgram(s)/spray Nasal Spray 1 Spray(s) Both Nostrils two times a day  lactulose Syrup 10 Gram(s) Oral two times a day  levETIRAcetam  IVPB 1000 milliGRAM(s) IV Intermittent every 12 hours  nystatin Powder 1 Application(s) Topical two times a day  OLANZapine 7.5 milliGRAM(s) Oral at bedtime  pantoprazole    Tablet 40 milliGRAM(s) Oral before breakfast  tiotropium 18 MICROgram(s) Capsule 1 Capsule(s) Inhalation daily  valproate sodium IVPB 1000 milliGRAM(s) IV Intermittent two times a day    MEDICATIONS  (PRN):      LABS:                        11.5   5.23  )-----------( 106      ( 17 Jun 2018 07:00 )             36.6     Hemoglobin: 11.5 g/dL (06-17 @ 07:00)  Hemoglobin: 10.8 g/dL (06-16 @ 12:20)  Hemoglobin: 9.9 g/dL (06-15 @ 03:30)  Hemoglobin: 10.2 g/dL (06-14 @ 02:38)  Hemoglobin: 10.7 g/dL (06-13 @ 02:30)    06-17    145  |  104  |  3<L>  ----------------------------<  74  4.1   |  29  |  0.54    Ca    8.5      17 Jun 2018 07:00  Phos  3.4     06-17  Mg     1.9     06-17    TPro  5.8<L>  /  Alb  2.3<L>  /  TBili  < 0.2<L>  /  DBili  x   /  AST  19  /  ALT  14  /  AlkPhos  69  06-16    Creatinine Trend: 0.54<--, 0.51<--, 0.62<--, 0.53<--, 0.47<--, 0.54<--           PHYSICAL EXAM  Vital Signs Last 24 Hrs  T(C): 36.4 (16 Jun 2018 21:22), Max: 36.5 (16 Jun 2018 14:22)  T(F): 97.5 (16 Jun 2018 21:22), Max: 97.7 (16 Jun 2018 14:22)  HR: 85 (17 Jun 2018 10:09) (70 - 86)  BP: 118/68 (16 Jun 2018 21:22) (118/68 - 127/71)  BP(mean): --  RR: 18 (16 Jun 2018 21:22) (17 - 18)  SpO2: 97% (17 Jun 2018 10:09) (94% - 99%)      Cardiovascular: Normal S1 S2, RRR  No JVD, 1/6 DIAZ murmur,  Respiratory: A/L  Lungs clear to auscultation, normal effort 	  Gastrointestinal:  Soft, Non-tender, + BS	  Extremities no edema, cyanosis, clubbing B/L LE's     TELEMETRY: SR, hx NSVT, APCS, BLocked APCS, Wenkebach on 6/12  	    RADIOLOGY:     < from: Xray Chest 1 View- PORTABLE-Urgent (06.06.18 @ 18:48) >  IMPRESSION:  Chin overlie and obscures portion of lung apices.    Uniform hazy opacity in peripheral lower left hemithorax obscuring   lateral left hemidiaphragm CP angle and left heart border related to   prominent epicardial fat as demonstrated on chestCT from 5/9/2018.     Sharp right CP angle. Clear remaining visualized lungs. No pneumothorax.    Heart size and mediastinal width inaccurately assessed on this projection.    Spinal degenerative changes again noted.    < end of copied text >    < from: CT Head No Cont (06.07.18 @ 02:40) >  Impression:     No acute intracranial hemorrhage, large cortical infarct or mass effect.   No significant interval change since 5/8/2018. If clinically indicated,   short-term follow-up or MRI may be obtained for further evaluation.    < end of copied text >  	  EEG   EEG Summary/Classification:  Abnormal EEG in awake state   - 	Total of 18 seizures captured: 9 electro-clinical seizures with right hemispheric onset (clinical semiology unclear), 3 electrographic seizures with right hemispheric onset and 6 electrographic seizures with left hemispheric onset.  -          Moderate background slowing     EEG Impression/Clinical Correlate:  1.	There were 18 electroclinical and subclinical seizures captured over the span of ~3hrs: 12 from the right hemisphere and 6 with onset in the left hemisphere.   2.	There is evidence of moderate nonspecific diffuse or multifocal cerebral dysfunction      ASSESSMENT/PLAN: 	54 y/o female with PmHx of cerebral palsy, intellectual disability, seizures, non-verbal at baseline, presented to [facility] ED after 4 seizures in less than 24 hours.  Cardiology consulted for abnormal EKG     --The patient has ruled out for acute coronary syndrome with negative serial cardiac enzymes  --EKG with NSR narrow QRS without acute ischemia   --Check TTE to assess LV function    --orthostatics negative  --tele events noted, avoid AVN blockers    --EP eval noted

## 2018-06-17 NOTE — PROGRESS NOTE ADULT - SUBJECTIVE AND OBJECTIVE BOX
Neurology Progress    NAYANA [id number]    HPI:  54 y/o female with PmHx of cerebral palsy, intellectual disability, seizures and is non-verbal presented to [facility] ED after 4 seizures in less than 24 hours. Seizures occurred yesterday at ~0200, ~0700, ~1200 and today at ~0500. With each seizure the pt was described as being more confused than baseline for about 5 minutes as per facility RN. This seizure activity was the same as previous seizures witnessed by RN and other staff. Pt normally has seizure activity 1-2x per month. There was also incontinence with each seizure activity, but pt is normally incontinent for urine and stool.     Pt had similar seizures last month and she was hospitalized from 5/8-11. Pt was discharged with an increased keppra dose to 1000 mg BID and was also started on Depakaote 500mg BID in place of her carbamzepine. Since that time she saw her PCP around May 30th per facility RN and the PCP lowered the pt's Keppra dose from 1000 mg to 750mg due to her Keppra levels being too high and also continued her carbamazepine with depakote reportedly.     Facility RN denies any recent travel, evidence of recent infections, syncope, weight changes, night sweats, PND, vomiting, constipation, trauma or changes in other medications. (07 Jun 2018 07:46)      Past Medical History  Intellectual disability  Constipation  Seizure disorder  Cerebral palsy      Past Surgical History  No significant past surgical history      MEDICATIONS    ALBUTerol    90 MICROgram(s) HFA Inhaler 1 Puff(s) Inhalation every 4 hours  ALBUTerol/ipratropium for Nebulization 3 milliLiter(s) Nebulizer every 6 hours  AQUAPHOR (petrolatum Ointment) 1 Application(s) Topical two times a day  artificial  tears Solution 2 Drop(s) Both EYES three times a day  aspirin enteric coated 81 milliGRAM(s) Oral daily  benztropine 0.5 milliGRAM(s) Oral two times a day  buDESOnide   0.5 milliGRAM(s) Respule 0.5 milliGRAM(s) Inhalation daily  clotrimazole 1% Cream 1 Application(s) Topical two times a day  enoxaparin Injectable 40 milliGRAM(s) SubCutaneous daily  fluticasone propionate 50 MICROgram(s)/spray Nasal Spray 1 Spray(s) Both Nostrils two times a day  lactulose Syrup 10 Gram(s) Oral two times a day  levETIRAcetam  IVPB 1000 milliGRAM(s) IV Intermittent every 12 hours  nystatin Powder 1 Application(s) Topical two times a day  OLANZapine 7.5 milliGRAM(s) Oral at bedtime  pantoprazole    Tablet 40 milliGRAM(s) Oral before breakfast  tiotropium 18 MICROgram(s) Capsule 1 Capsule(s) Inhalation daily  valproate sodium IVPB 1000 milliGRAM(s) IV Intermittent every 12 hours         Family history: No history of dementia, strokes, or seizures   FAMILY HISTORY:  No pertinent family history in first degree relatives    SOCIAL HISTORY -- No history of tobacco or alcohol use     Allergies    Topamax (Unknown)    Intolerances            Vital Signs Last 24 Hrs  T(C): 36.4 (16 Jun 2018 21:22), Max: 36.5 (16 Jun 2018 14:22)  T(F): 97.5 (16 Jun 2018 21:22), Max: 97.7 (16 Jun 2018 14:22)  HR: 82 (17 Jun 2018 04:39) (70 - 92)  BP: 118/68 (16 Jun 2018 21:22) (118/68 - 144/82)  BP(mean): --  RR: 18 (16 Jun 2018 21:22) (17 - 96)  SpO2: 98% (17 Jun 2018 04:39) (94% - 99%)        On Neurological Examination:    Mental Status - Patient is alert, awake, non verbal              Cranial Nerves - Extraocular muscle intact  [place] Facial symmetry Tongue midline, CnV1to V3 intact gross hearing intact      Motor Exam - spastic quad      Sensory    withdraws    GENERAL Exam:     Nontoxic , No Acute Distress   	  HEENT:  normocephalic, atraumatic  		  LUNGS:	Clear bilaterally  No Wheeze      VASCULAR: no carotid brui  	  HEART:	 Normal S1S2   No murmur RRR        	  MUSCULOSKELETAL: Normal Range of Motion  	   SKIN:      Normal   No Ecchymosis               LABS:  CBC Full  -  ( 17 Jun 2018 07:00 )  WBC Count : 5.23 K/uL  Hemoglobin : 11.5 g/dL  Hematocrit : 36.6 %  Platelet Count - Automated : 106 K/uL  Mean Cell Volume : 105.8 fL  Mean Cell Hemoglobin : 33.2 pg  Mean Cell Hemoglobin Concentration : 31.4 %  Auto Neutrophil # : x  Auto Lymphocyte # : x  Auto Monocyte # : x  Auto Eosinophil # : x  Auto Basophil # : x  Auto Neutrophil % : x  Auto Lymphocyte % : x  Auto Monocyte % : x  Auto Eosinophil % : x  Auto Basophil % : x      06-17    145  |  104  |  3<L>  ----------------------------<  74  4.1   |  29  |  0.54    Ca    8.5      17 Jun 2018 07:00  Phos  3.4     06-17  Mg     1.9     06-17    TPro  5.8<L>  /  Alb  2.3<L>  /  TBili  < 0.2<L>  /  DBili  x   /  AST  19  /  ALT  14  /  AlkPhos  69  06-16    Hemoglobin A1C:     LIVER FUNCTIONS - ( 16 Jun 2018 12:20 )  Alb: 2.3 g/dL / Pro: 5.8 g/dL / ALK PHOS: 69 u/L / ALT: 14 u/L / AST: 19 u/L / GGT: x           Vitamin B12         RADIOLOGY    EKG                schoenberg

## 2018-06-17 NOTE — DISCHARGE NOTE ADULT - CARE PLAN
Principal Discharge DX:	Seizure disorder  Goal:	Prevention of seizures  Assessment and plan of treatment:	You were admitted to the hospital for increased seizure activity. Your medications were changed and you were seizure free for > 48 hours. Please continue to take your medications as prescribed. It is important that you follow up with your neurologist within 2-3 days of discharge. If you have any more seizures, please seek medical attention immediately.

## 2018-06-17 NOTE — DISCHARGE NOTE ADULT - MEDICATION SUMMARY - MEDICATIONS TO CHANGE
I will SWITCH the dose or number of times a day I take the medications listed below when I get home from the hospital:    levETIRAcetam 1000 mg oral tablet  -- 1 tab(s) by mouth 2 times a day    Keppra 1000 mg oral tablet  -- 1 tab(s) by mouth 2 times a day   -- Check with your doctor before becoming pregnant.  It is very important that you take or use this exactly as directed.  Do not skip doses or discontinue unless directed by your doctor.  May cause drowsiness or dizziness.  Obtain medical advice before taking any non-prescription drugs as some may affect the action of this medication.  Swallow whole.  Do not crush.  This drug may impair the ability to drive or operate machinery.  Use care until you become familiar with its effects.    Depakote 500 mg oral delayed release tablet  -- 1 tab(s) by mouth 2 times a day   -- Do not take this drug if you are pregnant.  It is very important that you take or use this exactly as directed.  Do not skip doses or discontinue unless directed by your doctor.  May cause drowsiness.  Alcohol may intensify this effect.  Use care when operating dangerous machinery.  Swallow whole.  Do not crush.

## 2018-06-17 NOTE — DISCHARGE NOTE ADULT - ADDITIONAL INSTRUCTIONS
Please follow up with your neurologist Dr. Arana within 2-3 days of discharge. Please follow up with your neurologist, Dr. Arana, within 2-3 days of discharge. Please follow up with your PCP within 1 week of discharge.

## 2018-06-17 NOTE — PROVIDER CONTACT NOTE (MEDICATION) - ASSESSMENT
Valproate level 32.8. No signs and symptoms of Convulsions noted. Pt due to get Valproate 1000 mg IVPB

## 2018-06-17 NOTE — PROGRESS NOTE ADULT - PROBLEM SELECTOR PLAN 1
Originally presented from [facility] home for recurrent seizures after Keppra dose was decreased as outpatient and admitted to MICU where continuous vEEG showed persistent seizure activity. Now s/p keppra, valproate, fosphenytoin, vimpat, and ativan in MICU. Fosphenytoin and ativan tapered off without seizure activity. Mental status improved since admission, but reportedly not at baseline per family.  - c/w keppra 1g Q12H, valproate 1g Q12H, and lacosamide 300 Q12H and titrate per neuro recs  - valproate level in 30s, however continue current dosing of valproic acid per neuro note  - cont to monitor for seizure activity  - Ammonia level in AM to assess for sedation from valproate

## 2018-06-17 NOTE — DISCHARGE NOTE ADULT - PLAN OF CARE
Prevention of seizures You were admitted to the hospital for increased seizure activity. Your medications were changed and you were seizure free for > 48 hours. Please continue to take your medications as prescribed. It is important that you follow up with your neurologist within 2-3 days of discharge. If you have any more seizures, please seek medical attention immediately.

## 2018-06-17 NOTE — DISCHARGE NOTE ADULT - HOSPITAL COURSE
53F PMH cerebral palsy, seizures, nonverbal baseline, presented to [facility] from group home on 6/7/18 for recurrent seizures. In ED, had 2 witnessed seizures that resolved with Ativan and was admitted to medical service. Was previously on Keppra 1000mg BID and Depakote 500mg BID, changed to Keppra 750mg BID on 5/30/18 by PMD. While admitted, pt with 28+ episodes of brief (45sec-1min) tonic clonic seizure activity over 24 hours. Reported to have had no fevers or infectious symptoms from group [place]. Per neuro, seizure witnessed by resident consisted of: "Head turning from towards right, ictal cry, eye deviation to right and generalized stiffening of extremities" which aid at bedside and floor RNs state is consistent with the seizures pt has been having on the floor. After recurrent seizures, neuro recommending Fosphenytoin 500BID continuation, fosphenytoin load of 1500mg, cont keppra and depakote. Patient was accepted to MICU for concern for Status Epilepticus.    In MICU, patient was having frequent seizures and vimpat and ativan were both added. At that time, she was on a regimen of keppra, valproate, fosphenytoin, vimpat, ativan. Never required intubation. Continuous EEG monitoring and gradual reduction of seizures with titration of seizure medications. Over the last 3-4 days, fosphenytoin and ativan were tapered off with reduction of keppra to 1000 bid from 1500. Vimpat 300 bid and valproate 1000 bid. Pt has been more awake over the last 24-48 hours, recognized her mother and smiled, and has been able to tolerate PO. Occasionally wheezing throughout MICU stay and at one point hypothermic. Blood cultures were negative. Per family, patient still not quite at her baseline mental status. She was then transferred to the general medical floor for further management. On the medical floor her seizure medications continued to be titrated per neurology team. She was hypothermic again and repeat cultures, RVP, and CXR were done. Repeat cultures and RVP were negative and CXR showed no acute pulmonary process. The hypothermia, which was also present during previous hospitalizations, was attributed to autonomic dysfunction vs side effect of AED medication. Pt returned to baseline mental status and was discharged to her group home on a final regimen of  ___________. 53F PMH cerebral palsy, seizures, nonverbal baseline, presented to [facility] from group home on 6/7/18 for recurrent seizures. In ED, had 2 witnessed seizures that resolved with Ativan and was admitted to medical service. Was previously on Keppra 1000mg BID and Depakote 500mg BID, changed to Keppra 750mg BID on 5/30/18 by PMD. While admitted, pt with 28+ episodes of brief (45sec-1min) tonic clonic seizure activity over 24 hours. Reported to have had no fevers or infectious symptoms from group [place]. Per neuro, seizure witnessed by resident consisted of: "Head turning from towards right, ictal cry, eye deviation to right and generalized stiffening of extremities" which aid at bedside and floor RNs state is consistent with the seizures pt has been having on the floor. After recurrent seizures, neuro recommending Fosphenytoin 500BID continuation, fosphenytoin load of 1500mg, cont keppra and depakote. Patient was accepted to MICU for concern for Status Epilepticus.    In MICU, patient was having frequent seizures and vimpat and ativan were both added. At that time, she was on a regimen of keppra, valproate, fosphenytoin, vimpat, ativan. Never required intubation. Continuous EEG monitoring and gradual reduction of seizures with titration of seizure medications. Over the last 3-4 days, fosphenytoin and ativan were tapered off with reduction of keppra to 1000 bid from 1500. Vimpat 300 bid and valproate 1000 bid. Pt has been more awake, recognized her mother and smiled, and has been able to tolerate PO. Occasionally wheezing throughout MICU stay and at one point hypothermic. Blood cultures were negative. Per family, patient still not quite at her baseline mental status. She was then transferred to the general medical floor for further management. On the medical floor her seizure medications continued to be titrated per neurology team. She was hypothermic again and repeat cultures, RVP, and CXR were done. Repeat cultures and RVP were negative and CXR showed no acute pulmonary process. The hypothermia, which was also present during previous hospitalizations, was attributed to autonomic dysfunction vs side effect of AED medication. Pt had an additional series of seizures on 6/18 and was loaded with phenytoin and keppra. Pt was continued on oral phenytoin and keppra and was seizure free for > 48 hours. Patient was discharged back to her group home w/ control of AEDs. 53F PMH cerebral palsy, seizures, nonverbal baseline, presented to [facility] from group home on 6/7/18 for recurrent seizures. In ED, had 2 witnessed seizures that resolved with Ativan and was admitted to medical service. Was previously on Keppra 1000mg BID and Depakote 500mg BID, changed to Keppra 750mg BID on 5/30/18 by PMD. While admitted, pt with 28+ episodes of brief (45sec-1min) tonic clonic seizure activity over 24 hours. Reported to have had no fevers or infectious symptoms from group [place]. Per neuro, seizure witnessed by resident consisted of: "Head turning from towards right, ictal cry, eye deviation to right and generalized stiffening of extremities" which aid at bedside and floor RNs state is consistent with the seizures pt has been having on the floor. After recurrent seizures, neuro recommending Fosphenytoin 500BID continuation, fosphenytoin load of 1500mg, cont keppra and depakote. Patient was accepted to MICU for concern for Status Epilepticus.    In MICU, patient was having frequent seizures and vimpat and ativan were both added. At that time, she was on a regimen of keppra, valproate, fosphenytoin, vimpat, ativan. Never required intubation. Continuous EEG monitoring and gradual reduction of seizures with titration of seizure medications. Over the last 3-4 days, fosphenytoin and ativan were tapered off with reduction of keppra to 1000 bid from 1500. Vimpat 300 bid and valproate 1000 bid. Pt has been more awake, recognized her mother and smiled, and has been able to tolerate PO. Occasionally wheezing throughout MICU stay and at one point hypothermic. Blood cultures were negative. Per family, patient still not quite at her baseline mental status. She was then transferred to the general medical floor for further management. On the medical floor her seizure medications continued to be titrated per neurology team. She was hypothermic again and repeat cultures, RVP, and CXR were done. Repeat cultures and RVP were negative and CXR showed no acute pulmonary process. The hypothermia, which was also present during previous hospitalizations, was attributed to autonomic dysfunction vs side effect of AED medication. Pt had an additional series of seizures on 6/18 and was loaded with phenytoin and keppra. Pt was continued on oral phenytoin and keppra and was seizure free for > 48 hours. There was concern for dysphagia, pt got a S&S eval and cinesophagram. Based on results, speech pathology recommended dysphagia 2 diet w/ honey thickened fluids. Patient was discharged back to her group home w/ AEDs adjusted.

## 2018-06-17 NOTE — PROGRESS NOTE ADULT - ASSESSMENT
54 yo F with CP, seizure disorder presents with status epilepticus in the setting of recent decrease in anti-epileptic medication, now s/p MICU stay. Now without seizure activity, but with multiple episodes of hypothermia concerning for occult infection.

## 2018-06-17 NOTE — DISCHARGE NOTE ADULT - PATIENT PORTAL LINK FT
You can access the [facility] Portal, offered by [facility], by registering with the following website: [email or web address]

## 2018-06-17 NOTE — DISCHARGE NOTE ADULT - MEDICATION SUMMARY - MEDICATIONS TO STOP TAKING
I will STOP taking the medications listed below when I get home from the hospital:    valproic acid 250 mg oral capsule  -- 2 cap(s) by mouth 2 times a day    carBAMazepine 100 mg oral tablet, extended release  -- 1 tab(s) by mouth once a day    carBAMazepine 400 mg oral tablet, extended release  -- 1 tab(s) by mouth 2 times a day

## 2018-06-17 NOTE — PROVIDER CONTACT NOTE (MEDICATION) - ACTION/TREATMENT ORDERED:
MD aware. States will consult with Neurology in regards to increasing the dose. Will continue to monitor

## 2018-06-17 NOTE — DISCHARGE NOTE ADULT - CARE PROVIDER_API CALL
Adeel Hernandez), Family Medicine  [address] [place] [address]  Phone: [phone]  Fax: [phone] Adeel Hernandez), Family Medicine  [address] [place] [address]  Phone: [phone]  Fax: [phone]

## 2018-06-17 NOTE — DISCHARGE NOTE ADULT - MEDICATION SUMMARY - MEDICATIONS TO TAKE
I will START or STAY ON the medications listed below when I get home from the hospital:    budesonide 0.5 mg/2 mL inhalation suspension  -- 2 milliliter(s) inhaled once a day  -- Indication: For Dyspnea    Aspirin Enteric Coated 81 mg oral delayed release tablet  -- 1 tab(s) by mouth once a day  -- Indication: For CAD    Milk of Magnesia 8% oral suspension  -- 30 milliliter(s) by mouth once a day (at bedtime), As Needed - for constipation  -- Indication: For Constipation, unspecified constipation type    phenytoin 25 mg/mL oral suspension  -- 12 milliliter(s) by mouth once a day (at bedtime)  -- Indication: For Seizure    levETIRAcetam 100 mg/mL oral solution  -- 10 milliliter(s) by mouth 2 times a day  -- Indication: For Seizure    benztropine 0.5 mg oral tablet  -- 1 tab(s) by mouth 2 times a day  -- Indication: For Cerebral palsy    OLANZapine 7.5 mg oral tablet  -- 1 tab(s) by mouth once a day (in the evening)  -- Indication: For Insomnia    albuterol 2.5 mg/3 mL (0.083%) inhalation solution  -- 3 milliliter(s) inhaled every 6 hours, As Needed  -- Indication: For Dyspnea    clindamycin 1% topical lotion  -- Apply on skin to affected area once a day  -- Indication: For Rash    clotrimazole 1% topical cream  -- Apply on skin to affected area 2 times a day  -- Indication: For Rash    Vaseline topical ointment  -- Apply on skin to affected area 2 times a day to Lt shoulder mole  -- Indication: For Rash    metroNIDAZOLE 1% topical gel  -- Apply on skin to affected area once a day  -- Indication: For Rash    nystatin 100,000 units/g topical powder  -- Apply on skin to affected area 2 times a day  -- Indication: For Rash    ferrous sulfate 325 mg (65 mg elemental iron) oral tablet  -- 1 tab(s) by mouth once a day  -- Indication: For Anemia    senna 8.6 mg oral tablet  -- 2 tab(s) by mouth once a day (at bedtime)  -- Indication: For Constipation, unspecified constipation type    Colace 100 mg oral capsule  -- 3 cap(s) by mouth once a day (at bedtime)  -- Indication: For Constipation, unspecified constipation type    polyethylene glycol 3350 oral powder for reconstitution  -- 17 gram(s) by mouth once a day  -- Indication: For Constipation, unspecified constipation type    Fleet Enema 7 g-19 g rectal enema  -- 133 milliliter(s) rectally once a day, As Needed  -- Indication: For Constipation, unspecified constipation type    lactulose 10 g/15 mL oral solution  -- 15 milliliter(s) by mouth 2 times a day, As Needed  -- Indication: For Constipation, unspecified constipation type    fluticasone 50 mcg/inh nasal spray  -- 1 spray(s) into nose 2 times a day  -- Indication: For Dyspnea    Artificial Tears ophthalmic ointment  -- 1 application to each affected eye 2 times a day  -- Indication: For Dry Eyes    Artificial Tears ophthalmic solution  -- 2 drop(s) to each affected eye 3 times a day  -- Indication: For Dry Eyes    NexIUM 20 mg oral delayed release capsule  -- 1 cap(s) by mouth once a day  -- Indication: For Dysphagia    Robitussin Cough+Chest Simon DM 10 mg-200 mg oral capsule  -- 2 cap(s) by mouth every 6 hours, As Needed  -- Indication: For Cough    Multiple Vitamins oral tablet  -- 1 tab(s) by mouth once a day  -- Indication: For Nutrition    Calcium 600+D 600 mg-200 intl units oral tablet  -- 1 tab(s) by mouth 2 times a day  -- Indication: For Nutrition    folic acid 1 mg oral tablet  -- 1 tab(s) by mouth once a day  -- Indication: For Nutrition    Vitamin B1 100 mg oral tablet  -- 1 tab(s) by mouth once a day  -- Indication: For Nutrition I will START or STAY ON the medications listed below when I get home from the hospital:    budesonide 0.5 mg/2 mL inhalation suspension  -- 2 milliliter(s) inhaled once a day  -- Indication: For Dyspnea    Aspirin Enteric Coated 81 mg oral delayed release tablet  -- 1 tab(s) by mouth once a day  -- Indication: For CAD    Milk of Magnesia 8% oral suspension  -- 30 milliliter(s) by mouth once a day (at bedtime), As Needed - for constipation  -- Indication: For Constipation, unspecified constipation type    phenytoin 25 mg/mL oral suspension  -- 12 milliliter(s) by mouth once a day (at bedtime)  -- Indication: For Seizure    levETIRAcetam 100 mg/mL oral solution  -- 10 milliliter(s) by mouth 2 times a day  -- Indication: For Seizure    benztropine 0.5 mg oral tablet  -- 1 tab(s) by mouth 2 times a day  -- Indication: For Seizure    OLANZapine 7.5 mg oral tablet  -- 1 tab(s) by mouth once a day (in the evening)  -- Indication: For Insomnia    albuterol 2.5 mg/3 mL (0.083%) inhalation solution  -- 3 milliliter(s) inhaled every 6 hours, As Needed  -- Indication: For Dyspnea    clindamycin 1% topical lotion  -- Apply on skin to affected area once a day, As Needed  -- Indication: For Rash    clotrimazole 1% topical cream  -- Apply on skin to affected area 2 times a day, As Needed  -- Indication: For Rash    metroNIDAZOLE 1% topical gel  -- Apply on skin to affected area once a day, As Needed  -- Indication: For Rash    nystatin 100,000 units/g topical powder  -- Apply on skin to affected area 2 times a day, As Needed  -- Indication: For Rash    Vaseline topical ointment  -- Apply on skin to affected area 2 times a day, As Needed  -- Indication: For Rash    ferrous sulfate 325 mg (65 mg elemental iron) oral tablet  -- 1 tab(s) by mouth once a day  -- Indication: For Anemia    Colace 100 mg oral capsule  -- 3 cap(s) by mouth once a day (at bedtime)  -- Indication: For Constipation, unspecified constipation type    senna 8.6 mg oral tablet  -- 2 tab(s) by mouth once a day (at bedtime)  -- Indication: For Constipation, unspecified constipation type    polyethylene glycol 3350 oral powder for reconstitution  -- 17 gram(s) by mouth once a day, As Needed  -- Indication: For Constipation, unspecified constipation type    Fleet Enema 7 g-19 g rectal enema  -- 133 milliliter(s) rectally once a day, As Needed  -- Indication: For Constipation, unspecified constipation type    lactulose 10 g/15 mL oral solution  -- 15 milliliter(s) by mouth 2 times a day, As Needed  -- Indication: For Constipation, unspecified constipation type    fluticasone 50 mcg/inh nasal spray  -- 1 spray(s) into nose 2 times a day  -- Indication: For Allergies    Artificial Tears ophthalmic solution  -- 2 drop(s) to each affected eye 3 times a day  -- Indication: For Dry eyes    Artificial Tears ophthalmic ointment  -- 1 application to each affected eye 2 times a day  -- Indication: For Dry eyes    NexIUM 20 mg oral delayed release capsule  -- 1 cap(s) by mouth once a day  -- Indication: For GERD    Robitussin Cough+Chest Simon DM 10 mg-200 mg oral capsule  -- 2 cap(s) by mouth every 6 hours, As Needed  -- Indication: For Cough    Calcium 600+D 600 mg-200 intl units oral tablet  -- 1 tab(s) by mouth 2 times a day  -- Indication: For Nutrition    Multiple Vitamins oral tablet  -- 1 tab(s) by mouth once a day  -- Indication: For Nutrition    folic acid 1 mg oral tablet  -- 1 tab(s) by mouth once a day  -- Indication: For Nutrition    Vitamin B1 100 mg oral tablet  -- 1 tab(s) by mouth once a day  -- Indication: For Nutrition

## 2018-06-17 NOTE — PROGRESS NOTE ADULT - SUBJECTIVE AND OBJECTIVE BOX
Patient is a 53y old  Female who presents with a chief complaint of Status Epilepticus (17 Jun 2018 13:52)      SUBJECTIVE / OVERNIGHT EVENTS:  Overnight valproic acid level was subtherapeutic. Appeared comfortable this morning. Sleeping. Unable to obtain further ROS.    MEDICATIONS  (STANDING):  ALBUTerol    90 MICROgram(s) HFA Inhaler 1 Puff(s) Inhalation every 4 hours  ALBUTerol/ipratropium for Nebulization 3 milliLiter(s) Nebulizer every 6 hours  AQUAPHOR (petrolatum Ointment) 1 Application(s) Topical two times a day  artificial  tears Solution 2 Drop(s) Both EYES three times a day  aspirin enteric coated 81 milliGRAM(s) Oral daily  benztropine 0.5 milliGRAM(s) Oral two times a day  buDESOnide   0.5 milliGRAM(s) Respule 0.5 milliGRAM(s) Inhalation daily  clotrimazole 1% Cream 1 Application(s) Topical two times a day  enoxaparin Injectable 40 milliGRAM(s) SubCutaneous daily  fluticasone propionate 50 MICROgram(s)/spray Nasal Spray 1 Spray(s) Both Nostrils two times a day  lactulose Syrup 10 Gram(s) Oral two times a day  levETIRAcetam  IVPB 1000 milliGRAM(s) IV Intermittent every 12 hours  nystatin Powder 1 Application(s) Topical two times a day  OLANZapine 7.5 milliGRAM(s) Oral at bedtime  pantoprazole    Tablet 40 milliGRAM(s) Oral before breakfast  tiotropium 18 MICROgram(s) Capsule 1 Capsule(s) Inhalation daily  valproate sodium IVPB 1000 milliGRAM(s) IV Intermittent two times a day    MEDICATIONS  (PRN):      CAPILLARY BLOOD GLUCOSE        I&O's Summary      PHYSICAL EXAM:  GENERAL: NAD, well-developed  HEAD:  Atraumatic, Normocephalic  CHEST/LUNG: Breathing comfortably  HEART: Regular rate and rhythm on pulse, distant heart sounds  ABDOMEN: Soft, Nontender, Nondistended; no guarding or rigidity  EXTREMITIES:  Pedal edema  PSYCH: Sleeping  NEUROLOGY: Sleeping  SKIN: No rashes or lesions    LABS:                        11.5   5.23  )-----------( 106      ( 17 Jun 2018 07:00 )             36.6     06-17    145  |  104  |  3<L>  ----------------------------<  74  4.1   |  29  |  0.54    Ca    8.5      17 Jun 2018 07:00  Phos  3.4     06-17  Mg     1.9     06-17    TPro  5.8<L>  /  Alb  2.3<L>  /  TBili  < 0.2<L>  /  DBili  x   /  AST  19  /  ALT  14  /  AlkPhos  69  06-16              RADIOLOGY & ADDITIONAL TESTS:    Imaging Personally Reviewed:    Consultant(s) Notes Reviewed:      Care Discussed with Consultants/Other Providers:

## 2018-06-17 NOTE — PROGRESS NOTE ADULT - PROBLEM SELECTOR PLAN 5
Diet: Soft  DVT ppx: lovenox    Cortney Daniels MD  Internal Medicine, PGY-2  Pager [phone]

## 2018-06-18 LAB
AMMONIA BLD-MCNC: 42 UMOL/L — SIGNIFICANT CHANGE UP (ref 11–55)
BASOPHILS # BLD AUTO: 0.02 K/UL — SIGNIFICANT CHANGE UP (ref 0–0.2)
BASOPHILS NFR BLD AUTO: 0.3 % — SIGNIFICANT CHANGE UP (ref 0–2)
BUN SERPL-MCNC: 5 MG/DL — LOW (ref 7–23)
CALCIUM SERPL-MCNC: 8.5 MG/DL — SIGNIFICANT CHANGE UP (ref 8.4–10.5)
CHLORIDE SERPL-SCNC: 104 MMOL/L — SIGNIFICANT CHANGE UP (ref 98–107)
CO2 SERPL-SCNC: 32 MMOL/L — HIGH (ref 22–31)
CREAT SERPL-MCNC: 0.51 MG/DL — SIGNIFICANT CHANGE UP (ref 0.5–1.3)
EOSINOPHIL # BLD AUTO: 0.35 K/UL — SIGNIFICANT CHANGE UP (ref 0–0.5)
EOSINOPHIL NFR BLD AUTO: 5.9 % — SIGNIFICANT CHANGE UP (ref 0–6)
GLUCOSE BLDC GLUCOMTR-MCNC: 87 MG/DL — SIGNIFICANT CHANGE UP (ref 70–99)
GLUCOSE SERPL-MCNC: 76 MG/DL — SIGNIFICANT CHANGE UP (ref 70–99)
HCT VFR BLD CALC: 34 % — LOW (ref 34.5–45)
HGB BLD-MCNC: 10.7 G/DL — LOW (ref 11.5–15.5)
IMM GRANULOCYTES # BLD AUTO: 0.16 # — SIGNIFICANT CHANGE UP
IMM GRANULOCYTES NFR BLD AUTO: 2.7 % — HIGH (ref 0–1.5)
LYMPHOCYTES # BLD AUTO: 1.58 K/UL — SIGNIFICANT CHANGE UP (ref 1–3.3)
LYMPHOCYTES # BLD AUTO: 26.8 % — SIGNIFICANT CHANGE UP (ref 13–44)
MAGNESIUM SERPL-MCNC: 1.8 MG/DL — SIGNIFICANT CHANGE UP (ref 1.6–2.6)
MCHC RBC-ENTMCNC: 31.5 % — LOW (ref 32–36)
MCHC RBC-ENTMCNC: 32.8 PG — SIGNIFICANT CHANGE UP (ref 27–34)
MCV RBC AUTO: 104.3 FL — HIGH (ref 80–100)
MONOCYTES # BLD AUTO: 0.77 K/UL — SIGNIFICANT CHANGE UP (ref 0–0.9)
MONOCYTES NFR BLD AUTO: 13.1 % — SIGNIFICANT CHANGE UP (ref 2–14)
NEUTROPHILS # BLD AUTO: 3.01 K/UL — SIGNIFICANT CHANGE UP (ref 1.8–7.4)
NEUTROPHILS NFR BLD AUTO: 51.2 % — SIGNIFICANT CHANGE UP (ref 43–77)
NRBC # FLD: 0.02 — SIGNIFICANT CHANGE UP
PHENYTOIN FREE SERPL-MCNC: 13.3 UG/ML — SIGNIFICANT CHANGE UP (ref 10–20)
PHOSPHATE SERPL-MCNC: 3.5 MG/DL — SIGNIFICANT CHANGE UP (ref 2.5–4.5)
PLATELET # BLD AUTO: 108 K/UL — LOW (ref 150–400)
PMV BLD: 9.9 FL — SIGNIFICANT CHANGE UP (ref 7–13)
POTASSIUM SERPL-MCNC: 3.8 MMOL/L — SIGNIFICANT CHANGE UP (ref 3.5–5.3)
POTASSIUM SERPL-SCNC: 3.8 MMOL/L — SIGNIFICANT CHANGE UP (ref 3.5–5.3)
RBC # BLD: 3.26 M/UL — LOW (ref 3.8–5.2)
RBC # FLD: 14.9 % — HIGH (ref 10.3–14.5)
SODIUM SERPL-SCNC: 146 MMOL/L — HIGH (ref 135–145)
WBC # BLD: 5.89 K/UL — SIGNIFICANT CHANGE UP (ref 3.8–10.5)
WBC # FLD AUTO: 5.89 K/UL — SIGNIFICANT CHANGE UP (ref 3.8–10.5)

## 2018-06-18 PROCEDURE — 99233 SBSQ HOSP IP/OBS HIGH 50: CPT | Mod: GC

## 2018-06-18 RX ORDER — BENZTROPINE MESYLATE 1 MG
0.5 TABLET ORAL EVERY 12 HOURS
Refills: 0 | Status: DISCONTINUED | OUTPATIENT
Start: 2018-06-18 | End: 2018-06-20

## 2018-06-18 RX ORDER — FOSPHENYTOIN 50 MG/ML
300 INJECTION INTRAMUSCULAR; INTRAVENOUS
Refills: 0 | Status: DISCONTINUED | OUTPATIENT
Start: 2018-06-18 | End: 2018-06-18

## 2018-06-18 RX ORDER — LEVETIRACETAM 250 MG/1
1500 TABLET, FILM COATED ORAL ONCE
Refills: 0 | Status: COMPLETED | OUTPATIENT
Start: 2018-06-18 | End: 2018-06-18

## 2018-06-18 RX ORDER — FOSPHENYTOIN 50 MG/ML
1600 INJECTION INTRAMUSCULAR; INTRAVENOUS ONCE
Refills: 0 | Status: COMPLETED | OUTPATIENT
Start: 2018-06-18 | End: 2018-06-18

## 2018-06-18 RX ORDER — SODIUM CHLORIDE 9 MG/ML
1000 INJECTION, SOLUTION INTRAVENOUS
Refills: 0 | Status: DISCONTINUED | OUTPATIENT
Start: 2018-06-18 | End: 2018-06-19

## 2018-06-18 RX ORDER — FOSPHENYTOIN 50 MG/ML
100 INJECTION INTRAMUSCULAR; INTRAVENOUS EVERY 8 HOURS
Refills: 0 | Status: DISCONTINUED | OUTPATIENT
Start: 2018-06-18 | End: 2018-06-19

## 2018-06-18 RX ADMIN — Medication 1 APPLICATION(S): at 06:57

## 2018-06-18 RX ADMIN — Medication 1 SPRAY(S): at 18:30

## 2018-06-18 RX ADMIN — LEVETIRACETAM 400 MILLIGRAM(S): 250 TABLET, FILM COATED ORAL at 11:04

## 2018-06-18 RX ADMIN — Medication 1 APPLICATION(S): at 06:58

## 2018-06-18 RX ADMIN — LACTULOSE 10 GRAM(S): 10 SOLUTION ORAL at 06:57

## 2018-06-18 RX ADMIN — Medication 1 APPLICATION(S): at 18:30

## 2018-06-18 RX ADMIN — Medication 2 DROP(S): at 06:57

## 2018-06-18 RX ADMIN — FOSPHENYTOIN 264 MILLIGRAM(S) PE: 50 INJECTION INTRAMUSCULAR; INTRAVENOUS at 15:46

## 2018-06-18 RX ADMIN — SODIUM CHLORIDE 75 MILLILITER(S): 9 INJECTION, SOLUTION INTRAVENOUS at 11:03

## 2018-06-18 RX ADMIN — Medication 3 MILLILITER(S): at 09:39

## 2018-06-18 RX ADMIN — FOSPHENYTOIN 104 MILLIGRAM(S) PE: 50 INJECTION INTRAMUSCULAR; INTRAVENOUS at 23:42

## 2018-06-18 RX ADMIN — ENOXAPARIN SODIUM 40 MILLIGRAM(S): 100 INJECTION SUBCUTANEOUS at 12:35

## 2018-06-18 RX ADMIN — LEVETIRACETAM 400 MILLIGRAM(S): 250 TABLET, FILM COATED ORAL at 00:12

## 2018-06-18 RX ADMIN — NYSTATIN CREAM 1 APPLICATION(S): 100000 CREAM TOPICAL at 18:29

## 2018-06-18 RX ADMIN — SODIUM CHLORIDE 75 MILLILITER(S): 9 INJECTION, SOLUTION INTRAVENOUS at 16:34

## 2018-06-18 RX ADMIN — Medication 2 DROP(S): at 14:29

## 2018-06-18 RX ADMIN — Medication 2 DROP(S): at 22:24

## 2018-06-18 RX ADMIN — Medication 0.5 MILLIGRAM(S): at 06:57

## 2018-06-18 RX ADMIN — Medication 3 MILLILITER(S): at 22:16

## 2018-06-18 RX ADMIN — Medication 0.5 MILLIGRAM(S): at 18:29

## 2018-06-18 RX ADMIN — Medication 3 MILLILITER(S): at 03:36

## 2018-06-18 RX ADMIN — NYSTATIN CREAM 1 APPLICATION(S): 100000 CREAM TOPICAL at 06:57

## 2018-06-18 RX ADMIN — Medication 30 MILLIGRAM(S): at 00:13

## 2018-06-18 RX ADMIN — Medication 3 MILLILITER(S): at 16:19

## 2018-06-18 RX ADMIN — LEVETIRACETAM 400 MILLIGRAM(S): 250 TABLET, FILM COATED ORAL at 12:35

## 2018-06-18 RX ADMIN — Medication 1 SPRAY(S): at 06:57

## 2018-06-18 RX ADMIN — Medication 0.5 MILLIGRAM(S): at 09:39

## 2018-06-18 NOTE — PROGRESS NOTE ADULT - SUBJECTIVE AND OBJECTIVE BOX
NAYANA JACQUES  53y  Female  Patient is a 53y old  Female who presents with a chief complaint of Status Epilepticus (17 Jun 2018 13:52)    INTERVAL HPI/OVERNIGHT EVENTS:    Patient reportedly had 6 episodes of 30-45 second seizures overnight, RRT called this AM for seizure. Pt received no additional medications because all seizures broke prior to giving medications. Ordered ativan PRN for seizure activity. Spoke with neurology who will evaluate patient.    T(C): 36.4 (06-18-18 @ 05:59), Max: 36.4 (06-17-18 @ 21:29)  HR: 80 (06-18-18 @ 09:40) (65 - 89)  BP: 121/63 (06-18-18 @ 05:59) (121/63 - 153/86)  RR: 19 (06-18-18 @ 05:59) (16 - 19)  SpO2: 97% (06-18-18 @ 09:40) (92% - 97%)  Wt(kg): --Vital Signs Last 24 Hrs  T(C): 36.4 (18 Jun 2018 05:59), Max: 36.4 (17 Jun 2018 21:29)  T(F): 97.6 (18 Jun 2018 05:59), Max: 97.6 (18 Jun 2018 05:59)  HR: 80 (18 Jun 2018 09:40) (65 - 89)  BP: 121/63 (18 Jun 2018 05:59) (121/63 - 153/86)  BP(mean): --  RR: 19 (18 Jun 2018 05:59) (16 - 19)  SpO2: 97% (18 Jun 2018 09:40) (92% - 97%)    PHYSICAL EXAM:    GENERAL: NAD, well-developed  HEAD:  Atraumatic, Normocephalic  CHEST/LUNG: Breathing comfortably  HEART: Regular rate and rhythm on pulse, distant heart sounds  ABDOMEN: Soft, Nontender, Nondistended; no guarding or rigidity  EXTREMITIES:  Pedal edema  PSYCH: Poorly responsive  NEUROLOGY: Unable to assess as pt is poorly responsive and does not follow commands  SKIN: No rashes or lesions    LABS:                        10.7   5.89  )-----------( 108      ( 18 Jun 2018 09:17 )             34.0     06-18    146<H>  |  104  |  5<L>  ----------------------------<  76  3.8   |  32<H>  |  0.51    Ca    8.5      18 Jun 2018 07:00  Phos  3.5     06-18  Mg     1.8     06-18    TPro  5.8<L>  /  Alb  2.3<L>  /  TBili  < 0.2<L>  /  DBili  x   /  AST  19  /  ALT  14  /  AlkPhos  69  06-16    CAPILLARY BLOOD GLUCOSE    POCT Blood Glucose.: 87 mg/dL (18 Jun 2018 07:20)    RADIOLOGY & ADDITIONAL TESTS:    Xray Chest 1 View- PORTABLE-Urgent (06.16.18 @ 08:04)  EXAM:  XR CHEST PORTABLE URGENT 1V      PROCEDURE DATE:  Jun 16 2018     INTERPRETATION:  TIME OF EXAM: June 16, 2018 at 8:01 AM    CLINICAL INFORMATION:  Follow-up SICU.    TECHNIQUE:   Portable chest    INTERPRETATION:     No interval change from the last exam with hazy left lung base obscuring   the hemidiaphragm representing epicardial fat as seen on CT chest of May   9. The left upper lobe and right lung are clear. Heart is not enlarged.   No pneumothorax.    COMPARISON:  June 6, 2018    IMPRESSION:  Follow-up showing no interval change.    LILA ROCK M.D.; ATTENDING RADIOLOGIST  This document has been electronically signed. Jun 16 2018  9:27AM

## 2018-06-18 NOTE — CHART NOTE - NSCHARTNOTESELECT_GEN_ALL_CORE
Event Note
MAR RRT Note/Event Note
MICU to Medicine/Transfer Note
NF Resident Note/Event Note
Event Note
Neurology/Event Note
Transfer Note

## 2018-06-18 NOTE — PROGRESS NOTE ADULT - SUBJECTIVE AND OBJECTIVE BOX
Neurology Follow up note    Name  NAYANA JACQUES        Subjective: Saw and examined patient at bedside. Called back to room as patient was having another seizure. Resolved on its own. 6 seizures reported overnight.    MEDICATIONS  (STANDING):  ALBUTerol    90 MICROgram(s) HFA Inhaler 1 Puff(s) Inhalation every 4 hours  ALBUTerol/ipratropium for Nebulization 3 milliLiter(s) Nebulizer every 6 hours  AQUAPHOR (petrolatum Ointment) 1 Application(s) Topical two times a day  artificial  tears Solution 2 Drop(s) Both EYES three times a day  aspirin enteric coated 81 milliGRAM(s) Oral daily  benztropine 0.5 milliGRAM(s) Oral two times a day  buDESOnide   0.5 milliGRAM(s) Respule 0.5 milliGRAM(s) Inhalation daily  clotrimazole 1% Cream 1 Application(s) Topical two times a day  enoxaparin Injectable 40 milliGRAM(s) SubCutaneous daily  fluticasone propionate 50 MICROgram(s)/spray Nasal Spray 1 Spray(s) Both Nostrils two times a day  fosphenytoin IVPB 1600 milliGRAM(s) PE IV Intermittent once  fosphenytoin IVPB 300 milliGRAM(s) PE IV Intermittent <User Schedule>  lactulose Syrup 10 Gram(s) Oral two times a day  levETIRAcetam  IVPB 1000 milliGRAM(s) IV Intermittent every 12 hours  nystatin Powder 1 Application(s) Topical two times a day  OLANZapine 7.5 milliGRAM(s) Oral at bedtime  pantoprazole    Tablet 40 milliGRAM(s) Oral before breakfast  sodium chloride 0.45%. 1000 milliLiter(s) (75 mL/Hr) IV Continuous <Continuous>  tiotropium 18 MICROgram(s) Capsule 1 Capsule(s) Inhalation daily    MEDICATIONS  (PRN):  LORazepam   Injectable 0.5 milliGRAM(s) IV Push every 6 hours PRN seizure activity < 30seconds  LORazepam   Injectable 1 milliGRAM(s) IV Push every 6 hours PRN seizure activity > 30 seconds      Allergies    Topamax (Unknown)    Intolerances        Objective:   Vital Signs Last 24 Hrs  T(C): 36.4 (18 Jun 2018 05:59), Max: 36.4 (17 Jun 2018 21:29)  T(F): 97.6 (18 Jun 2018 05:59), Max: 97.6 (18 Jun 2018 05:59)  HR: 80 (18 Jun 2018 09:40) (65 - 89)  BP: 121/63 (18 Jun 2018 05:59) (121/63 - 153/86)  BP(mean): --  RR: 19 (18 Jun 2018 05:59) (16 - 19)  SpO2: 97% (18 Jun 2018 09:40) (92% - 97%)    General Exam:   General appearance: No acute distress                   Neurological Exam:  Mental Status: alert and awake, non verbal, does not follow commands    Cranial Nerves: EOMI, V1-V3 intact, facial symmetry intact    Motor: moves extremities spontaneously    Sensation: Intact to LT throughout    Coordination: deferred      Other:    06-18    146<H>  |  104  |  5<L>  ----------------------------<  76  3.8   |  32<H>  |  0.51    Ca    8.5      18 Jun 2018 07:00  Phos  3.5     06-18  Mg     1.8     06-18    TPro  5.8<L>  /  Alb  2.3<L>  /  TBili  < 0.2<L>  /  DBili  x   /  AST  19  /  ALT  14  /  AlkPhos  69  06-16    06-18    146<H>  |  104  |  5<L>  ----------------------------<  76  3.8   |  32<H>  |  0.51    Ca    8.5      18 Jun 2018 07:00  Phos  3.5     06-18  Mg     1.8     06-18    TPro  5.8<L>  /  Alb  2.3<L>  /  TBili  < 0.2<L>  /  DBili  x   /  AST  19  /  ALT  14  /  AlkPhos  69  06-16    LIVER FUNCTIONS - ( 16 Jun 2018 12:20 )  Alb: 2.3 g/dL / Pro: 5.8 g/dL / ALK PHOS: 69 u/L / ALT: 14 u/L / AST: 19 u/L / GGT: x             Radiology    EKG:  tele:  TTE:  EEG:

## 2018-06-18 NOTE — CHART NOTE - NSCHARTNOTEFT_GEN_A_CORE
Alerted that lacosamide had been autodiscontinued on 6/16, which was stated here in the hospital.     Pt just loaded and switched to fosphenytoin today. Will hold off restarting lacosamide for now.     Will continue to reassess how she is doing on this regimen.

## 2018-06-18 NOTE — PROGRESS NOTE ADULT - PROBLEM SELECTOR PLAN 1
Originally presented from [facility] home for recurrent seizures after Keppra dose was decreased as outpatient and admitted to MICU where continuous vEEG showed persistent seizure activity. Now s/p keppra, valproate, fosphenytoin, vimpat, and ativan in MICU. Fosphenytoin and ativan tapered off without seizure activity.   - recurrent self-limiting seizures this AM, no medications given as they lasted 30-45 seconds  - c/w keppra 1g Q12H, valproate 1g Q12H and titrate per neuro recs  - valproate level in 30s, however continue current dosing of valproic acid per neuro note  - f/u neurology recs for AED management Originally presented from [facility] home for recurrent seizures after Keppra dose was decreased as outpatient and admitted to MICU where continuous vEEG showed persistent seizure activity. Now s/p keppra, valproate, fosphenytoin, vimpat, and ativan in MICU. Fosphenytoin and ativan tapered off without seizure activity.   - recurrent self-limiting seizures this AM, no medications given as they lasted 30-45 seconds  - as per neuro, will d/c valproate, load with fosphenytoin 20mg/kg and start 300mg qhs. will also load with 1500mg of keppra and continue with same dose.  - will obtain dilantin level this afternoon  - cont to f/u neuro recs

## 2018-06-18 NOTE — PROGRESS NOTE ADULT - SUBJECTIVE AND OBJECTIVE BOX
CHIEF COMPLAINT: Recurrent seizures    Interval Events: 53 F w/ PMH of CP, seizure disorder, minimally verbal at baseline admitted to MICU initially with recurrent seizures. Anti seizure regimen was titrated and seizure activity decreased clinically and on vEEG. Patient transferred to the floors on 6/15 and had been seizure free until last night. Overnight she had around 6 seizures lasting <45 seconds and self-limiting. RRT called this AM for tonic clonic seizure lasting <1 minute that was self-limiting. Patient protecting her airway and satting in mid 90s during the duration of the seizure. Patient has had several additional seizure episodes lasting <2 minutes throughout the day. She has not required any Ativan. Neurology called and recommended d/c valproic acid, load with Keppra 1500 mg load with fosphenytoin and then continue with standing dose. Of note, patient previously on Vimpat which was started in the MICU, but was auto-discontinued on 6/16 and unclear if intended. Per primary team, no signs of infection including URI symptoms, abdominal pain and diarrhea. Patient normothermic for last 48 hours.       REVIEW OF SYSTEMS:  Unable to obtain as patient nonverbal    OBJECTIVE:  ICU Vital Signs Last 24 Hrs  T(C): 36.6 (18 Jun 2018 14:16), Max: 36.6 (18 Jun 2018 14:16)  T(F): 97.9 (18 Jun 2018 14:16), Max: 97.9 (18 Jun 2018 14:16)  HR: 78 (18 Jun 2018 14:16) (65 - 89)  BP: 121/65 (18 Jun 2018 14:16) (121/63 - 153/86)  BP(mean): --  ABP: --  ABP(mean): --  RR: 20 (18 Jun 2018 14:16) (16 - 20)  SpO2: 95% (18 Jun 2018 14:16) (92% - 97%)      POCT Blood Glucose.: 87 mg/dL (18 Jun 2018 07:20)      PHYSICAL EXAM:  General: awake, agitated when being cleaned and then somnolent   Respiratory: CTAB   Cardiovascular: S1S2+, RRR  Abdomen: soft, NT/ND  Extremities: no clubbing, cyanosis or edema  Skin: No rash   Neurological: alert and arousable, no focal deficits  Psychiatry:    HOSPITAL MEDICATIONS:  MEDICATIONS  (STANDING):  ALBUTerol    90 MICROgram(s) HFA Inhaler 1 Puff(s) Inhalation every 4 hours  ALBUTerol/ipratropium for Nebulization 3 milliLiter(s) Nebulizer every 6 hours  AQUAPHOR (petrolatum Ointment) 1 Application(s) Topical two times a day  artificial  tears Solution 2 Drop(s) Both EYES three times a day  aspirin enteric coated 81 milliGRAM(s) Oral daily  benztropine 0.5 milliGRAM(s) Oral two times a day  buDESOnide   0.5 milliGRAM(s) Respule 0.5 milliGRAM(s) Inhalation daily  clotrimazole 1% Cream 1 Application(s) Topical two times a day  enoxaparin Injectable 40 milliGRAM(s) SubCutaneous daily  fluticasone propionate 50 MICROgram(s)/spray Nasal Spray 1 Spray(s) Both Nostrils two times a day  fosphenytoin IVPB 1600 milliGRAM(s) PE IV Intermittent once  fosphenytoin IVPB 100 milliGRAM(s) PE IV Intermittent every 8 hours  lactulose Syrup 10 Gram(s) Oral two times a day  levETIRAcetam  IVPB 1000 milliGRAM(s) IV Intermittent every 12 hours  nystatin Powder 1 Application(s) Topical two times a day  OLANZapine 7.5 milliGRAM(s) Oral at bedtime  pantoprazole    Tablet 40 milliGRAM(s) Oral before breakfast  sodium chloride 0.45%. 1000 milliLiter(s) (75 mL/Hr) IV Continuous <Continuous>  tiotropium 18 MICROgram(s) Capsule 1 Capsule(s) Inhalation daily    MEDICATIONS  (PRN):  LORazepam   Injectable 0.5 milliGRAM(s) IV Push every 6 hours PRN seizure activity < 30seconds  LORazepam   Injectable 1 milliGRAM(s) IV Push every 6 hours PRN seizure activity > 30 seconds      LABS:                        10.7   5.89  )-----------( 108      ( 18 Jun 2018 09:17 )             34.0     06-18    146<H>  |  104  |  5<L>  ----------------------------<  76  3.8   |  32<H>  |  0.51    Ca    8.5      18 Jun 2018 07:00  Phos  3.5     06-18  Mg     1.8     06-18

## 2018-06-18 NOTE — CHART NOTE - NSCHARTNOTEFT_GEN_A_CORE
Called to bedside for GTC seizure.  Patient is a 53F with cerebral palsy, seizures, nonverbal baseline, presented to [facility] ED from group home on 6/7/18 for recurrent seizures.  Current seizure lasted under 1min, but per aide at bedside, patient had had 6 previous seizures overnight.  Patient without desaturation during event and maintained at mid-90%.  After seizure, she was postictal but noted to have purposeful movements, which made subclinical seizures unlikely.    Primary team at bedside and to contact Neurology for further evaluation and management.    -EMY Hernandez  Internal Medicine Resident PGY-3  [phone] Called to bedside for GTC seizure.  Patient is a 53F with cerebral palsy, seizures, nonverbal baseline, presented to [facility] ED from group home on 6/7/18 for recurrent seizures.  Current seizure lasted under 1min, but per aide at bedside, patient had had 6 previous seizures overnight.  Patient without desaturation during event and maintained at mid-90%.  After seizure, she was postictal but noted to have purposeful movements, which made subclinical seizures unlikely.    Primary team at bedside to place PRN Ativan orders for seizures as well as contact Neurology for further evaluation and management.      -EMY Hernandez  Internal Medicine Resident PGY-3  [phone]

## 2018-06-18 NOTE — PROGRESS NOTE ADULT - ASSESSMENT
54 y/o F w/ CP and severe MR, intractable seizures (frequency 1-2x/mo as per outpatient Neurologist Dr Arana) presenting w/ multiple seizures.    Recently have been going down on Dilantin.    Plan:  d/c Depakote  load with Phenytoin 20mg/kg   start phenytoin 300 mg qhs  check dilantin level in afternoon  load keppra 1500mg and continue 1000 BID

## 2018-06-18 NOTE — PROGRESS NOTE ADULT - PROBLEM SELECTOR PLAN 5
Diet: soft  DVT ppx: lovenox    Cortney Daniels MD  Internal Medicine, PGY-2  Pager [phone] Diet: soft  DVT ppx: lovenox

## 2018-06-18 NOTE — PROGRESS NOTE ADULT - ASSESSMENT
53 F w/ PMH of CP, seizure disorder, minimally verbal at baseline admitted to MICU initially with recurrent seizures. Anti seizure regimen was titrated and seizure activity decreased clinically and on vEEG. Patient transferred to the floors and seizure free until last night after which she has had several self-limiting seizures. On review of medication history, Vimpat had been auto-discontinued after 6/16, but unclear if intended which may have been precipitating event. No signs of active infection. Patient protecting airway without recorded desaturations.    Recommendations:   -Clarify Vimpat order  -Follow up neuro recs concerning anti-seizure medications  -Monitor respiratory status    Patient not a candidate for MICU at this time

## 2018-06-18 NOTE — PROGRESS NOTE ADULT - SUBJECTIVE AND OBJECTIVE BOX
pt appears comfortable, no signs of acute distress, events noted      MEDICATIONS  (STANDING):  ALBUTerol    90 MICROgram(s) HFA Inhaler 1 Puff(s) Inhalation every 4 hours  ALBUTerol/ipratropium for Nebulization 3 milliLiter(s) Nebulizer every 6 hours  AQUAPHOR (petrolatum Ointment) 1 Application(s) Topical two times a day  artificial  tears Solution 2 Drop(s) Both EYES three times a day  aspirin enteric coated 81 milliGRAM(s) Oral daily  benztropine 0.5 milliGRAM(s) Oral two times a day  buDESOnide   0.5 milliGRAM(s) Respule 0.5 milliGRAM(s) Inhalation daily  clotrimazole 1% Cream 1 Application(s) Topical two times a day  enoxaparin Injectable 40 milliGRAM(s) SubCutaneous daily  fluticasone propionate 50 MICROgram(s)/spray Nasal Spray 1 Spray(s) Both Nostrils two times a day  fosphenytoin IVPB 1600 milliGRAM(s) PE IV Intermittent once  lactulose Syrup 10 Gram(s) Oral two times a day  levETIRAcetam  IVPB 1000 milliGRAM(s) IV Intermittent every 12 hours  nystatin Powder 1 Application(s) Topical two times a day  OLANZapine 7.5 milliGRAM(s) Oral at bedtime  pantoprazole    Tablet 40 milliGRAM(s) Oral before breakfast  phenytoin   Capsule 300 milliGRAM(s) Oral at bedtime  sodium chloride 0.45%. 1000 milliLiter(s) (75 mL/Hr) IV Continuous <Continuous>  tiotropium 18 MICROgram(s) Capsule 1 Capsule(s) Inhalation daily    MEDICATIONS  (PRN):  LORazepam   Injectable 0.5 milliGRAM(s) IV Push every 6 hours PRN seizure activity < 30seconds  LORazepam   Injectable 1 milliGRAM(s) IV Push every 6 hours PRN seizure activity > 30 seconds      LABS:                        10.7   5.89  )-----------( 108      ( 18 Jun 2018 09:17 )             34.0     146<H>  |  104  |  5<L>  ----------------------------<  76  3.8   |  32<H>  |  0.51    Ca    8.5      18 Jun 2018 07:00  Phos  3.5     06-18  Mg     1.8     06-18    Creatinine Trend: 0.51<--, 0.54<--, 0.51<--, 0.62<--, 0.53<--, 0.47<--    PHYSICAL EXAM  Vital Signs Last 24 Hrs  T(C): 36.4 (18 Jun 2018 05:59), Max: 36.4 (17 Jun 2018 21:29)  T(F): 97.6 (18 Jun 2018 05:59), Max: 97.6 (18 Jun 2018 05:59)  HR: 80 (18 Jun 2018 09:40) (65 - 89)  BP: 121/63 (18 Jun 2018 05:59) (121/63 - 153/86)  RR: 19 (18 Jun 2018 05:59) (16 - 19)  SpO2: 97% (18 Jun 2018 09:40) (92% - 97%)    Cardiovascular: Normal S1 S2, RRR  No JVD  Respiratory: Lungs clear to auscultation, normal effort 	  Gastrointestinal:  Soft, Non-tender, + BS	  Extremities no edema, cyanosis, clubbing B/L LE's      RADIOLOGY:     < from: Xray Chest 1 View- PORTABLE-Urgent (06.06.18 @ 18:48) >  IMPRESSION:  Chin overlie and obscures portion of lung apices.    Uniform hazy opacity in peripheral lower left hemithorax obscuring   lateral left hemidiaphragm CP angle and left heart border related to   prominent epicardial fat as demonstrated on chestCT from 5/9/2018.     Sharp right CP angle. Clear remaining visualized lungs. No pneumothorax.    Heart size and mediastinal width inaccurately assessed on this projection.    Spinal degenerative changes again noted.    < end of copied text >    < from: CT Head No Cont (06.07.18 @ 02:40) >  Impression:     No acute intracranial hemorrhage, large cortical infarct or mass effect.   No significant interval change since 5/8/2018. If clinically indicated,   short-term follow-up or MRI may be obtained for further evaluation.    < end of copied text >  	  EEG   EEG Summary/Classification:  Abnormal EEG in awake state   - 	Total of 18 seizures captured: 9 electro-clinical seizures with right hemispheric onset (clinical semiology unclear), 3 electrographic seizures with right hemispheric onset and 6 electrographic seizures with left hemispheric onset.  -          Moderate background slowing     EEG Impression/Clinical Correlate:  1.	There were 18 electroclinical and subclinical seizures captured over the span of ~3hrs: 12 from the right hemisphere and 6 with onset in the left hemisphere.   2.	There is evidence of moderate nonspecific diffuse or multifocal cerebral dysfunction      ASSESSMENT/PLAN: 	54 y/o female with PmHx of cerebral palsy, intellectual disability, seizures, non-verbal at baseline, presented to [facility] ED after 4 seizures in less than 24 hours.  Cardiology consulted for abnormal EKG     --The patient has ruled out for acute coronary syndrome with negative serial cardiac enzymes  --EKG with NSR narrow QRS without acute ischemia   --Check TTE to assess LV function    --orthostatics negative  --tele events noted, with Estela, avoid AVN blockers    --EP eval noted      Madiha Domínguez PA-C

## 2018-06-19 LAB
BASOPHILS # BLD AUTO: 0.03 K/UL — SIGNIFICANT CHANGE UP (ref 0–0.2)
BASOPHILS NFR BLD AUTO: 0.6 % — SIGNIFICANT CHANGE UP (ref 0–2)
BUN SERPL-MCNC: 5 MG/DL — LOW (ref 7–23)
CALCIUM SERPL-MCNC: 8.7 MG/DL — SIGNIFICANT CHANGE UP (ref 8.4–10.5)
CHLORIDE SERPL-SCNC: 102 MMOL/L — SIGNIFICANT CHANGE UP (ref 98–107)
CO2 SERPL-SCNC: 32 MMOL/L — HIGH (ref 22–31)
CREAT SERPL-MCNC: 0.51 MG/DL — SIGNIFICANT CHANGE UP (ref 0.5–1.3)
EOSINOPHIL # BLD AUTO: 0.44 K/UL — SIGNIFICANT CHANGE UP (ref 0–0.5)
EOSINOPHIL NFR BLD AUTO: 8.1 % — HIGH (ref 0–6)
GLUCOSE SERPL-MCNC: 68 MG/DL — LOW (ref 70–99)
HCT VFR BLD CALC: 34.6 % — SIGNIFICANT CHANGE UP (ref 34.5–45)
HGB BLD-MCNC: 11 G/DL — LOW (ref 11.5–15.5)
IMM GRANULOCYTES # BLD AUTO: 0.1 # — SIGNIFICANT CHANGE UP
IMM GRANULOCYTES NFR BLD AUTO: 1.8 % — HIGH (ref 0–1.5)
LYMPHOCYTES # BLD AUTO: 1.73 K/UL — SIGNIFICANT CHANGE UP (ref 1–3.3)
LYMPHOCYTES # BLD AUTO: 31.8 % — SIGNIFICANT CHANGE UP (ref 13–44)
MAGNESIUM SERPL-MCNC: 1.7 MG/DL — SIGNIFICANT CHANGE UP (ref 1.6–2.6)
MCHC RBC-ENTMCNC: 31.8 % — LOW (ref 32–36)
MCHC RBC-ENTMCNC: 32.5 PG — SIGNIFICANT CHANGE UP (ref 27–34)
MCV RBC AUTO: 102.4 FL — HIGH (ref 80–100)
MONOCYTES # BLD AUTO: 0.82 K/UL — SIGNIFICANT CHANGE UP (ref 0–0.9)
MONOCYTES NFR BLD AUTO: 15.1 % — HIGH (ref 2–14)
NEUTROPHILS # BLD AUTO: 2.32 K/UL — SIGNIFICANT CHANGE UP (ref 1.8–7.4)
NEUTROPHILS NFR BLD AUTO: 42.6 % — LOW (ref 43–77)
NRBC # FLD: 0.03 — SIGNIFICANT CHANGE UP
PHENYTOIN FREE SERPL-MCNC: 15.4 UG/ML — SIGNIFICANT CHANGE UP (ref 10–20)
PLATELET # BLD AUTO: 120 K/UL — LOW (ref 150–400)
PMV BLD: 9.7 FL — SIGNIFICANT CHANGE UP (ref 7–13)
POTASSIUM SERPL-MCNC: 3.9 MMOL/L — SIGNIFICANT CHANGE UP (ref 3.5–5.3)
POTASSIUM SERPL-SCNC: 3.9 MMOL/L — SIGNIFICANT CHANGE UP (ref 3.5–5.3)
RBC # BLD: 3.38 M/UL — LOW (ref 3.8–5.2)
RBC # FLD: 15.6 % — HIGH (ref 10.3–14.5)
SODIUM SERPL-SCNC: 143 MMOL/L — SIGNIFICANT CHANGE UP (ref 135–145)
WBC # BLD: 5.44 K/UL — SIGNIFICANT CHANGE UP (ref 3.8–10.5)
WBC # FLD AUTO: 5.44 K/UL — SIGNIFICANT CHANGE UP (ref 3.8–10.5)

## 2018-06-19 RX ORDER — FOSPHENYTOIN 50 MG/ML
100 INJECTION INTRAMUSCULAR; INTRAVENOUS EVERY 8 HOURS
Refills: 0 | Status: DISCONTINUED | OUTPATIENT
Start: 2018-06-19 | End: 2018-06-20

## 2018-06-19 RX ORDER — SODIUM CHLORIDE 9 MG/ML
1000 INJECTION, SOLUTION INTRAVENOUS
Refills: 0 | Status: DISCONTINUED | OUTPATIENT
Start: 2018-06-19 | End: 2018-06-20

## 2018-06-19 RX ADMIN — Medication 1 APPLICATION(S): at 05:04

## 2018-06-19 RX ADMIN — Medication 1 APPLICATION(S): at 18:12

## 2018-06-19 RX ADMIN — Medication 1 APPLICATION(S): at 05:03

## 2018-06-19 RX ADMIN — NYSTATIN CREAM 1 APPLICATION(S): 100000 CREAM TOPICAL at 05:05

## 2018-06-19 RX ADMIN — Medication 3 MILLILITER(S): at 22:03

## 2018-06-19 RX ADMIN — Medication 1 SPRAY(S): at 18:14

## 2018-06-19 RX ADMIN — Medication 3 MILLILITER(S): at 16:17

## 2018-06-19 RX ADMIN — Medication 3 MILLILITER(S): at 10:40

## 2018-06-19 RX ADMIN — Medication 1 APPLICATION(S): at 18:14

## 2018-06-19 RX ADMIN — Medication 2 DROP(S): at 13:17

## 2018-06-19 RX ADMIN — Medication 0.5 MILLIGRAM(S): at 06:36

## 2018-06-19 RX ADMIN — Medication 2 DROP(S): at 05:04

## 2018-06-19 RX ADMIN — Medication 1 SPRAY(S): at 05:04

## 2018-06-19 RX ADMIN — Medication 3 MILLILITER(S): at 03:48

## 2018-06-19 RX ADMIN — FOSPHENYTOIN 104 MILLIGRAM(S) PE: 50 INJECTION INTRAMUSCULAR; INTRAVENOUS at 21:49

## 2018-06-19 RX ADMIN — Medication 0.5 MILLIGRAM(S): at 18:13

## 2018-06-19 RX ADMIN — FOSPHENYTOIN 104 MILLIGRAM(S) PE: 50 INJECTION INTRAMUSCULAR; INTRAVENOUS at 09:42

## 2018-06-19 RX ADMIN — NYSTATIN CREAM 1 APPLICATION(S): 100000 CREAM TOPICAL at 18:12

## 2018-06-19 RX ADMIN — FOSPHENYTOIN 104 MILLIGRAM(S) PE: 50 INJECTION INTRAMUSCULAR; INTRAVENOUS at 14:23

## 2018-06-19 RX ADMIN — LEVETIRACETAM 400 MILLIGRAM(S): 250 TABLET, FILM COATED ORAL at 13:17

## 2018-06-19 RX ADMIN — Medication 0.5 MILLIGRAM(S): at 10:40

## 2018-06-19 RX ADMIN — SODIUM CHLORIDE 75 MILLILITER(S): 9 INJECTION, SOLUTION INTRAVENOUS at 09:41

## 2018-06-19 RX ADMIN — ENOXAPARIN SODIUM 40 MILLIGRAM(S): 100 INJECTION SUBCUTANEOUS at 13:16

## 2018-06-19 RX ADMIN — LEVETIRACETAM 400 MILLIGRAM(S): 250 TABLET, FILM COATED ORAL at 00:24

## 2018-06-19 RX ADMIN — Medication 2 DROP(S): at 21:49

## 2018-06-19 NOTE — PROGRESS NOTE ADULT - ASSESSMENT
52 yo F with CP, seizure disorder presents with status epilepticus in the setting of recent decrease in anti-epileptic medication, now s/p MICU stay, now with recurrent seizures this AM in setting of tapering AEDs.

## 2018-06-19 NOTE — PROGRESS NOTE ADULT - SUBJECTIVE AND OBJECTIVE BOX
NAYANA JACQUES  53y  Female  Patient is a 53y old  Female who presents with a chief complaint of Status Epilepticus (17 Jun 2018 13:52)    INTERVAL HPI/OVERNIGHT EVENTS:    No overnight events. No seizures since yesterday morning. Patient calm throughout the night but lethargic.    T(C): 36.5 (06-19-18 @ 05:00), Max: 36.6 (06-18-18 @ 14:16)  HR: 74 (06-19-18 @ 05:00) (72 - 88)  BP: 121/93 (06-19-18 @ 05:00) (121/65 - 132/67)  RR: 18 (06-19-18 @ 05:00) (18 - 20)  SpO2: 95% (06-19-18 @ 05:00) (93% - 98%)  Wt(kg): --Vital Signs Last 24 Hrs  T(C): 36.5 (19 Jun 2018 05:00), Max: 36.6 (18 Jun 2018 14:16)  T(F): 97.7 (19 Jun 2018 05:00), Max: 97.9 (18 Jun 2018 14:16)  HR: 74 (19 Jun 2018 05:00) (72 - 88)  BP: 121/93 (19 Jun 2018 05:00) (121/65 - 132/67)  BP(mean): --  RR: 18 (19 Jun 2018 05:00) (18 - 20)  SpO2: 95% (19 Jun 2018 05:00) (93% - 98%)    PHYSICAL EXAM:  GENERAL: NAD, well-developed  HEAD:  Atraumatic, Normocephalic  CHEST/LUNG: Breathing comfortably  HEART: Regular rate and rhythm on pulse, distant heart sounds  ABDOMEN: Soft, Nontender, Nondistended; no guarding or rigidity  EXTREMITIES:  Pedal edema  PSYCH: Poorly responsive  NEUROLOGY: Unable to assess as pt is poorly responsive and does not follow commands  SKIN: No rashes or lesions    LABS:                        11.0   5.44  )-----------( 120      ( 19 Jun 2018 07:23 )             34.6     06-19    143  |  102  |  5<L>  ----------------------------<  68<L>  3.9   |  32<H>  |  0.51    Ca    8.7      19 Jun 2018 07:23  Phos  3.5     06-18  Mg     1.7     06-19    RADIOLOGY & ADDITIONAL TESTS:    No interval imaging.

## 2018-06-19 NOTE — PROGRESS NOTE ADULT - PROBLEM SELECTOR PLAN 1
- pt p/w recurrent seizures in the setting of AED dose reduction  - c/w fosphenytoin 100mg IV q8, keppra 1000mg q12  - as per neurology, lacosamide should not be continued at this time  - no seizure activity overnight  - cont to f/u neuro recs

## 2018-06-19 NOTE — PROGRESS NOTE ADULT - SUBJECTIVE AND OBJECTIVE BOX
pt appears comfortable, no signs of acute distress, events noted      MEDICATIONS  (STANDING):  ALBUTerol    90 MICROgram(s) HFA Inhaler 1 Puff(s) Inhalation every 4 hours  ALBUTerol/ipratropium for Nebulization 3 milliLiter(s) Nebulizer every 6 hours  AQUAPHOR (petrolatum Ointment) 1 Application(s) Topical two times a day  artificial  tears Solution 2 Drop(s) Both EYES three times a day  aspirin enteric coated 81 milliGRAM(s) Oral daily  benztropine Injectable 0.5 milliGRAM(s) IV Push every 12 hours  buDESOnide   0.5 milliGRAM(s) Respule 0.5 milliGRAM(s) Inhalation daily  clotrimazole 1% Cream 1 Application(s) Topical two times a day  dextrose 5% + sodium chloride 0.45%. 1000 milliLiter(s) (75 mL/Hr) IV Continuous <Continuous>  enoxaparin Injectable 40 milliGRAM(s) SubCutaneous daily  fluticasone propionate 50 MICROgram(s)/spray Nasal Spray 1 Spray(s) Both Nostrils two times a day  lactulose Syrup 10 Gram(s) Oral two times a day  levETIRAcetam  IVPB 1000 milliGRAM(s) IV Intermittent every 12 hours  nystatin Powder 1 Application(s) Topical two times a day  OLANZapine 7.5 milliGRAM(s) Oral at bedtime  phenytoin   Capsule 300 milliGRAM(s) Oral at bedtime  tiotropium 18 MICROgram(s) Capsule 1 Capsule(s) Inhalation daily    MEDICATIONS  (PRN):  LORazepam   Injectable 0.5 milliGRAM(s) IV Push every 6 hours PRN seizure activity < 30seconds  LORazepam   Injectable 1 milliGRAM(s) IV Push every 6 hours PRN seizure activity > 30 seconds      LABS:                        11.0   5.44  )-----------( 120      ( 19 Jun 2018 07:23 )             34.6     143  |  102  |  5<L>  ----------------------------<  68<L>  3.9   |  32<H>  |  0.51    Ca    8.7      19 Jun 2018 07:23  Phos  3.5     06-18  Mg     1.7     06-19    Creatinine Trend: 0.51<--, 0.51<--, 0.54<--, 0.51<--, 0.62<--, 0.53<--     PHYSICAL EXAM  Vital Signs Last 24 Hrs  T(C): 36.5 (19 Jun 2018 05:00), Max: 36.6 (18 Jun 2018 14:16)  T(F): 97.7 (19 Jun 2018 05:00), Max: 97.9 (18 Jun 2018 14:16)  HR: 80 (19 Jun 2018 10:44) (72 - 88)  BP: 121/93 (19 Jun 2018 05:00) (121/65 - 132/67)  RR: 18 (19 Jun 2018 05:00) (18 - 20)  SpO2: 93% (19 Jun 2018 10:44) (93% - 98%)    Cardiovascular: Normal S1 S2, RRR  No JVD  Respiratory: Lungs clear to auscultation, normal effort 	  Gastrointestinal:  Soft, Non-tender, + BS	  Extremities no edema, cyanosis, clubbing B/L LE's      RADIOLOGY:     < from: Xray Chest 1 View- PORTABLE-Urgent (06.06.18 @ 18:48) >  IMPRESSION:  Chin overlie and obscures portion of lung apices.    Uniform hazy opacity in peripheral lower left hemithorax obscuring   lateral left hemidiaphragm CP angle and left heart border related to   prominent epicardial fat as demonstrated on chestCT from 5/9/2018.     Sharp right CP angle. Clear remaining visualized lungs. No pneumothorax.    Heart size and mediastinal width inaccurately assessed on this projection.    Spinal degenerative changes again noted.    < end of copied text >    < from: CT Head No Cont (06.07.18 @ 02:40) >  Impression:     No acute intracranial hemorrhage, large cortical infarct or mass effect.   No significant interval change since 5/8/2018. If clinically indicated,   short-term follow-up or MRI may be obtained for further evaluation.    < end of copied text >  	  EEG   EEG Summary/Classification:  Abnormal EEG in awake state   - 	Total of 18 seizures captured: 9 electro-clinical seizures with right hemispheric onset (clinical semiology unclear), 3 electrographic seizures with right hemispheric onset and 6 electrographic seizures with left hemispheric onset.  -          Moderate background slowing     EEG Impression/Clinical Correlate:  1.	There were 18 electroclinical and subclinical seizures captured over the span of ~3hrs: 12 from the right hemisphere and 6 with onset in the left hemisphere.   2.	There is evidence of moderate nonspecific diffuse or multifocal cerebral dysfunction    TELE--NSR    ASSESSMENT/PLAN: 	54 y/o female with PmHx of cerebral palsy, intellectual disability, seizures, non-verbal at baseline, presented to [facility] ED after 4 seizures in less than 24 hours.  Cardiology consulted for abnormal EKG     --The patient has ruled out for acute coronary syndrome with negative serial cardiac enzymes  --EKG with NSR narrow QRS without acute ischemia   --Check TTE to assess LV function    --orthostatics negative  --tele events noted, with Estela, avoid AVN blockers    --EP eval noted      Madiha Domínguez PA-C

## 2018-06-19 NOTE — PROGRESS NOTE ADULT - ASSESSMENT
52 y/o F w/ CP and severe MR, intractable seizures (frequency 1-2x/mo as per outpatient Neurologist Dr Arana) presenting w/ multiple seizures.    Recently have been going down on Dilantin.    Plan:  d/c Depakote  load with Phenytoin 20mg/kg   start phenytoin 300 mg qhs  check dilantin level in afternoon  load keppra 1500mg and continue 1000 BID

## 2018-06-20 DIAGNOSIS — R13.10 DYSPHAGIA, UNSPECIFIED: ICD-10-CM

## 2018-06-20 LAB
BUN SERPL-MCNC: 4 MG/DL — LOW (ref 7–23)
CALCIUM SERPL-MCNC: 9.2 MG/DL — SIGNIFICANT CHANGE UP (ref 8.4–10.5)
CHLORIDE SERPL-SCNC: 101 MMOL/L — SIGNIFICANT CHANGE UP (ref 98–107)
CO2 SERPL-SCNC: 26 MMOL/L — SIGNIFICANT CHANGE UP (ref 22–31)
CREAT SERPL-MCNC: 0.51 MG/DL — SIGNIFICANT CHANGE UP (ref 0.5–1.3)
GLUCOSE BLDC GLUCOMTR-MCNC: 74 MG/DL — SIGNIFICANT CHANGE UP (ref 70–99)
GLUCOSE SERPL-MCNC: 50 MG/DL — LOW (ref 70–99)
HCT VFR BLD CALC: 37.2 % — SIGNIFICANT CHANGE UP (ref 34.5–45)
HGB BLD-MCNC: 11.7 G/DL — SIGNIFICANT CHANGE UP (ref 11.5–15.5)
MAGNESIUM SERPL-MCNC: 1.7 MG/DL — SIGNIFICANT CHANGE UP (ref 1.6–2.6)
MCHC RBC-ENTMCNC: 31.5 % — LOW (ref 32–36)
MCHC RBC-ENTMCNC: 33.3 PG — SIGNIFICANT CHANGE UP (ref 27–34)
MCV RBC AUTO: 106 FL — HIGH (ref 80–100)
NRBC # FLD: 0.03 — SIGNIFICANT CHANGE UP
PHOSPHATE SERPL-MCNC: 3.4 MG/DL — SIGNIFICANT CHANGE UP (ref 2.5–4.5)
PLATELET # BLD AUTO: 174 K/UL — SIGNIFICANT CHANGE UP (ref 150–400)
PMV BLD: 11.2 FL — SIGNIFICANT CHANGE UP (ref 7–13)
POTASSIUM SERPL-MCNC: 5.1 MMOL/L — SIGNIFICANT CHANGE UP (ref 3.5–5.3)
POTASSIUM SERPL-SCNC: 5.1 MMOL/L — SIGNIFICANT CHANGE UP (ref 3.5–5.3)
RBC # BLD: 3.51 M/UL — LOW (ref 3.8–5.2)
RBC # FLD: 15.7 % — HIGH (ref 10.3–14.5)
SODIUM SERPL-SCNC: 144 MMOL/L — SIGNIFICANT CHANGE UP (ref 135–145)
WBC # BLD: 7.81 K/UL — SIGNIFICANT CHANGE UP (ref 3.8–10.5)
WBC # FLD AUTO: 7.81 K/UL — SIGNIFICANT CHANGE UP (ref 3.8–10.5)

## 2018-06-20 RX ORDER — SODIUM CHLORIDE 9 MG/ML
1000 INJECTION, SOLUTION INTRAVENOUS
Refills: 0 | Status: DISCONTINUED | OUTPATIENT
Start: 2018-06-20 | End: 2018-06-22

## 2018-06-20 RX ORDER — LEVETIRACETAM 250 MG/1
1000 TABLET, FILM COATED ORAL
Refills: 0 | Status: DISCONTINUED | OUTPATIENT
Start: 2018-06-20 | End: 2018-06-23

## 2018-06-20 RX ORDER — SODIUM CHLORIDE 9 MG/ML
1000 INJECTION, SOLUTION INTRAVENOUS
Refills: 0 | Status: DISCONTINUED | OUTPATIENT
Start: 2018-06-20 | End: 2018-06-20

## 2018-06-20 RX ORDER — BENZTROPINE MESYLATE 1 MG
0.5 TABLET ORAL
Refills: 0 | Status: DISCONTINUED | OUTPATIENT
Start: 2018-06-20 | End: 2018-06-23

## 2018-06-20 RX ADMIN — Medication 1 MILLIGRAM(S): at 17:55

## 2018-06-20 RX ADMIN — NYSTATIN CREAM 1 APPLICATION(S): 100000 CREAM TOPICAL at 17:59

## 2018-06-20 RX ADMIN — LEVETIRACETAM 1000 MILLIGRAM(S): 250 TABLET, FILM COATED ORAL at 18:55

## 2018-06-20 RX ADMIN — SODIUM CHLORIDE 75 MILLILITER(S): 9 INJECTION, SOLUTION INTRAVENOUS at 19:01

## 2018-06-20 RX ADMIN — Medication 1 SPRAY(S): at 17:58

## 2018-06-20 RX ADMIN — Medication 0.5 MILLIGRAM(S): at 17:55

## 2018-06-20 RX ADMIN — Medication 3 MILLILITER(S): at 16:49

## 2018-06-20 RX ADMIN — Medication 2 DROP(S): at 05:22

## 2018-06-20 RX ADMIN — Medication 1 APPLICATION(S): at 05:22

## 2018-06-20 RX ADMIN — Medication 3 MILLILITER(S): at 03:57

## 2018-06-20 RX ADMIN — Medication 1 SPRAY(S): at 05:23

## 2018-06-20 RX ADMIN — Medication 3 MILLILITER(S): at 22:53

## 2018-06-20 RX ADMIN — NYSTATIN CREAM 1 APPLICATION(S): 100000 CREAM TOPICAL at 05:23

## 2018-06-20 RX ADMIN — LEVETIRACETAM 400 MILLIGRAM(S): 250 TABLET, FILM COATED ORAL at 00:09

## 2018-06-20 RX ADMIN — Medication 2 DROP(S): at 13:35

## 2018-06-20 RX ADMIN — Medication 1 APPLICATION(S): at 17:58

## 2018-06-20 RX ADMIN — ENOXAPARIN SODIUM 40 MILLIGRAM(S): 100 INJECTION SUBCUTANEOUS at 13:35

## 2018-06-20 RX ADMIN — LACTULOSE 10 GRAM(S): 10 SOLUTION ORAL at 18:54

## 2018-06-20 RX ADMIN — Medication 1 APPLICATION(S): at 05:23

## 2018-06-20 NOTE — PROVIDER CONTACT NOTE (OTHER) - ASSESSMENT
Pt is alert and nonverbal x 0. She does not show any s/s of seizures, distress or discomfort.
as mentioned above, pt returned back to baseline post seizure.
as mentioned. no loss of loc, tongue biting.
patient is stable in bed with aide at bedside. vitals stable.   random valporate level drawn and result showed below therapeutic . Dr. Daniels order stat dose with neuro cancelled and re-ordered 1000mg to be started at 6pm.  patient will be missing a dose in between if not corrected.
Pt AOx0 nonverbal. Pt having frequent seizure like activity, arousable after seizure, but lethargic. Pt eyes and arms mild twitching/jerking movements. Seizures lasting between 30-40 seconds.
Pt AOx0 nonverbal. Pt having frequent seizure like activity, arousable after seizure, but lethargic. Pt eyes and arms mild twitching/jerking movements. Seizures lasting between 40-50 seconds

## 2018-06-20 NOTE — PROVIDER CONTACT NOTE (OTHER) - NAME OF MD/NP/PA/DO NOTIFIED:
KRISTIN Delgado [id number]
Dr Daniels.  A covering weekend Team2
HS 2
ADS NP Asuncion
NEFTALY Marquez
ADS Ravi

## 2018-06-20 NOTE — PROVIDER CONTACT NOTE (OTHER) - SITUATION
RNs tried to place an IV on patient four times and was unsuccessful. Pt ordered for IV medication.
52 yo female from group home came in for convulsions  Pt having frequent seizure like activity, unable to tolerate PO meds @this moment
Pt having frequent seizure like activity
Pt was hypothermic (94.5 rectally) upon receiving pt from ED. In addition at 0945 had a 45 seconds eyes rolled to her right and b/l arms twitching.
pt having multiple seizures all lasting shorter than 30 seconds. B/L arm twitching.
patient is missing morning valproate sodium IV PB dose from this morning due to order changes by neuro

## 2018-06-20 NOTE — SWALLOW BEDSIDE ASSESSMENT ADULT - DIET PRIOR TO ADMI
1/4 inch solids with thin liquids as per PCA at bedside from group home
1/4 inch solids with thin liquids as per PCA at bedside from group home

## 2018-06-20 NOTE — SWALLOW BEDSIDE ASSESSMENT ADULT - PHARYNGEAL PHASE
no overt s/s of penetration/aspiration/Delayed pharyngeal swallow/Decreased laryngeal elevation audible swallows/Delayed pharyngeal swallow/Decreased laryngeal elevation/Multiple swallows

## 2018-06-20 NOTE — SWALLOW BEDSIDE ASSESSMENT ADULT - SWALLOW EVAL: RECOMMENDED FEEDING/EATING TECHNIQUES
allow for swallow between intakes/alternate food with liquid/crush medication (when feasible)/maintain upright posture during/after eating for 30 mins/position upright (90 degrees)/small sips/bites

## 2018-06-20 NOTE — SWALLOW BEDSIDE ASSESSMENT ADULT - SWALLOW EVAL: RECOMMENDED DIET
Defer oral diet to MD as patient refused all attempted trials during today's evaluation.
Initiate puree and honey thickened liquids.

## 2018-06-20 NOTE — PROGRESS NOTE ADULT - ASSESSMENT
54 yo F with CP, seizure disorder presents with status epilepticus in the setting of recent decrease in anti-epileptic medication, now s/p MICU stay, without seizures >24hrs.

## 2018-06-20 NOTE — SWALLOW BEDSIDE ASSESSMENT ADULT - ADDITIONAL RECOMMENDATIONS
1. This department to continue to follow for speech and swallowing therapy as schedule permits.  2. MD to reconsult if changes in medical status/mental status should occur

## 2018-06-20 NOTE — PROVIDER CONTACT NOTE (OTHER) - REASON
Pt having frequent seizure like activity
patient is missing morning dose of valporate sodium IV PB
seizures
hypothermia and seizure
Frequent seizure activity
RNs attempted to place IV four times and was unsuccessful.

## 2018-06-20 NOTE — PROGRESS NOTE ADULT - SUBJECTIVE AND OBJECTIVE BOX
NAYANA JACQUES  53y  Female  Patient is a 53y old  Female who presents with a chief complaint of Status Epilepticus (17 Jun 2018 13:52)    INTERVAL HPI/OVERNIGHT EVENTS:    No overnight events. No reports of witnessed seizures overnight. Patient failed bedside swallow eval, nurse recommends official S&S.    T(C): 36.8 (06-20-18 @ 05:21), Max: 36.8 (06-20-18 @ 05:21)  HR: 72 (06-20-18 @ 05:21) (72 - 85)  BP: 145/96 (06-20-18 @ 05:21) (123/72 - 145/96)  RR: 18 (06-20-18 @ 05:21) (18 - 18)  SpO2: 98% (06-20-18 @ 05:21) (93% - 100%)  Wt(kg): --Vital Signs Last 24 Hrs  T(C): 36.8 (20 Jun 2018 05:21), Max: 36.8 (20 Jun 2018 05:21)  T(F): 98.2 (20 Jun 2018 05:21), Max: 98.2 (20 Jun 2018 05:21)  HR: 72 (20 Jun 2018 05:21) (72 - 85)  BP: 145/96 (20 Jun 2018 05:21) (123/72 - 145/96)  BP(mean): --  RR: 18 (20 Jun 2018 05:21) (18 - 18)  SpO2: 98% (20 Jun 2018 05:21) (93% - 100%)    PHYSICAL EXAM:    GENERAL: NAD, well-developed  HEAD:  Atraumatic, Normocephalic  CHEST/LUNG: Breathing comfortably  HEART: Regular rate and rhythm on pulse, distant heart sounds  ABDOMEN: Soft, Nontender, Nondistended; no guarding or rigidity  EXTREMITIES: No LE edema, unable to assess ROM  PSYCH: Poorly responsive  NEUROLOGY: Unable to assess as pt is poorly responsive and does not follow commands  SKIN: No rashes or lesions    LABS:                        11.0   5.44  )-----------( 120      ( 19 Jun 2018 07:23 )             34.6     06-19    143  |  102  |  5<L>  ----------------------------<  68<L>  3.9   |  32<H>  |  0.51    Ca    8.7      19 Jun 2018 07:23  Mg     1.7     06-19    CAPILLARY BLOOD GLUCOSE    POCT Blood Glucose.: 74 mg/dL (20 Jun 2018 06:11)    RADIOLOGY & ADDITIONAL TESTS:    No interval imaging.

## 2018-06-20 NOTE — PROVIDER CONTACT NOTE (OTHER) - RECOMMENDATIONS
ADS NP made aware. Neurology has seen patient. MICU consult has seen patient. Pt will be transferred to MICU.
give a stat dose now and then next dose in 12 hours.
Neurology consult and expedite EEG
Pt requires US IV placement.

## 2018-06-20 NOTE — SWALLOW BEDSIDE ASSESSMENT ADULT - SLP PERTINENT HISTORY OF CURRENT PROBLEM
6 month STM    Message left for patient to return call    Device settings:    EPAP Min Auto CPAP: 7.0 (The minimum allowable pressure in cmH2O)    EPAP Max Auto CPAP: 10.0 (The maximum allowable pressure in cmH2O)    Target IPAP (95% of Target) 14 day average (Resmed): 10cm H20    Objective measures: 14 day rolling measures      Compliance   (Goal >70%)  --% compliance greater than four hours rolling average 14 days: 64.2%      Leak   (Goal < 24 lpm) --95% OF Leak in litres Rolling Average 14 Days: 6.3 lpm      AHI  (Goal < 5)  --AHI Rolling Average 14 Day: 13.58      Usage  (Goal >240)  --Time mask on face 14 day average: 229 min      Assessment:Pt not meeting objective benchmarks for  AHI    Action plan: pt to follow up per provider request (1-2 yrs)      
53F PMH cerebral palsy, seizures, nonverbal baseline, presented to [facility] from group home on 6/7/18 for recurrent seizures, seizure cluster of 28 seizures today. Loaded with fosphenytoin and MICU consulted, would benefit from MICU for closer monitoring, VEEG, given already on maximal therapy and if continues to seize may require intubation with propofol and/or versed.
53F PMH cerebral palsy, seizures, nonverbal baseline, presented to [facility] from group home on 6/7/18 for recurrent seizures, seizure cluster of 28 seizures today. Loaded with fosphenytoin and MICU consulted, would benefit from MICU for closer monitoring, VEEG, given already on maximal therapy and if continues to seize may require intubation with propofol and/or versed.

## 2018-06-20 NOTE — PROVIDER CONTACT NOTE (OTHER) - DATE AND TIME:
07-Jun-2018 10:00
08-Jun-2018 21:21
07-Jun-2018 17:05
17-Jun-2018 12:00
08-Jun-2018 17:34
20-Jun-2018 11:49

## 2018-06-20 NOTE — PROGRESS NOTE ADULT - PROBLEM SELECTOR PLAN 5
-  episode of bradycardia to 30s on telemetry in MICU with concern for possible 2nd degree AV block type II.  - EP evaluated, determined not heart block, NTD

## 2018-06-20 NOTE — SWALLOW BEDSIDE ASSESSMENT ADULT - SWALLOW EVAL: DIAGNOSIS
1.	Patient demonstrated mild oral stage dysphagia for puree, nectar thickened liquids, thin liquids, and honey thickened liquids marked by reduced bolus manipulation/control, reduced bolus formation, reduced anterior-posterior movement of bolus, reduced oral transit, however noted with adequate oral clearance.  Patient demonstrated facial grimacing during the swallow with suspected loss of control during thin liquid and nectar thickened liquid intake.  2. Patient demonstrated mild pharyngeal stage dysphagia for puree and honey thickened liquids marked by mildly delayed swallow trigger, reduced hyolaryngeal elevation, and no overt signs or symptoms of penetration/aspiration.   3.) Patient demonstrated moderate pharyngeal stage dysphagia for thin liquids and nectar thickened liquids marked by delayed swallow trigger, reduced hyolaryngeal elevation, multiple swallows and audible swallows indicative of possible penetration/aspiration and/or stasis.
1. Patient refused all attempted solids and thin liquids.  Patient awake however noted with pursed lips upon presentation of solid and thin liquid via PCA from group home.  Therefore oral and pharyngeal stage fo swallow could not be assessed at this time.   MD to re-consult this department as needed when patient is more compliant and less agitated.

## 2018-06-20 NOTE — PROGRESS NOTE ADULT - SUBJECTIVE AND OBJECTIVE BOX
pt appears comfortable, no signs of acute distress, events noted    MEDICATIONS  (STANDING):  ALBUTerol    90 MICROgram(s) HFA Inhaler 1 Puff(s) Inhalation every 4 hours  ALBUTerol/ipratropium for Nebulization 3 milliLiter(s) Nebulizer every 6 hours  AQUAPHOR (petrolatum Ointment) 1 Application(s) Topical two times a day  artificial  tears Solution 2 Drop(s) Both EYES three times a day  aspirin enteric coated 81 milliGRAM(s) Oral daily  benztropine Injectable 0.5 milliGRAM(s) IV Push every 12 hours  buDESOnide   0.5 milliGRAM(s) Respule 0.5 milliGRAM(s) Inhalation daily  clotrimazole 1% Cream 1 Application(s) Topical two times a day  dextrose 5% + sodium chloride 0.45%. 1000 milliLiter(s) (75 mL/Hr) IV Continuous <Continuous>  enoxaparin Injectable 40 milliGRAM(s) SubCutaneous daily  fluticasone propionate 50 MICROgram(s)/spray Nasal Spray 1 Spray(s) Both Nostrils two times a day  fosphenytoin IVPB 100 milliGRAM(s) PE IV Intermittent every 8 hours  lactulose Syrup 10 Gram(s) Oral two times a day  levETIRAcetam  IVPB 1000 milliGRAM(s) IV Intermittent every 12 hours  nystatin Powder 1 Application(s) Topical two times a day  OLANZapine 7.5 milliGRAM(s) Oral at bedtime  tiotropium 18 MICROgram(s) Capsule 1 Capsule(s) Inhalation daily    MEDICATIONS  (PRN):  LORazepam   Injectable 0.5 milliGRAM(s) IV Push every 6 hours PRN seizure activity < 30seconds  LORazepam   Injectable 1 milliGRAM(s) IV Push every 6 hours PRN seizure activity > 30 seconds    LABS:                        11.7   7.81  )-----------( 174      ( 20 Jun 2018 06:12 )             37.2     144  |  101  |  4<L>  ----------------------------<  50<L>  5.1   |  26  |  0.51    Ca    9.2      20 Jun 2018 06:12  Phos  3.4     06-20  Mg     1.7     06-20    Creatinine Trend: 0.51<--, 0.51<--, 0.51<--, 0.54<--, 0.51<--, 0.62<--     PHYSICAL EXAM  Vital Signs Last 24 Hrs  T(C): 36.8 (20 Jun 2018 05:21), Max: 36.8 (20 Jun 2018 05:21)  T(F): 98.2 (20 Jun 2018 05:21), Max: 98.2 (20 Jun 2018 05:21)  HR: 72 (20 Jun 2018 05:21) (72 - 85)  BP: 145/96 (20 Jun 2018 05:21) (123/72 - 145/96)  RR: 18 (20 Jun 2018 05:21) (18 - 18)  SpO2: 98% (20 Jun 2018 05:21) (94% - 100%)    Cardiovascular: Normal S1 S2, RRR  No JVD  Respiratory: Lungs clear to auscultation, normal effort 	  Gastrointestinal:  Soft, Non-tender, + BS	  Extremities no edema, cyanosis, clubbing B/L LE's      RADIOLOGY:     < from: Xray Chest 1 View- PORTABLE-Urgent (06.06.18 @ 18:48) >  IMPRESSION:  Chin overlie and obscures portion of lung apices.    Uniform hazy opacity in peripheral lower left hemithorax obscuring   lateral left hemidiaphragm CP angle and left heart border related to   prominent epicardial fat as demonstrated on chestCT from 5/9/2018.     Sharp right CP angle. Clear remaining visualized lungs. No pneumothorax.    Heart size and mediastinal width inaccurately assessed on this projection.    Spinal degenerative changes again noted.    < end of copied text >    < from: CT Head No Cont (06.07.18 @ 02:40) >  Impression:     No acute intracranial hemorrhage, large cortical infarct or mass effect.   No significant interval change since 5/8/2018. If clinically indicated,   short-term follow-up or MRI may be obtained for further evaluation.    < end of copied text >  	  EEG   EEG Summary/Classification:  Abnormal EEG in awake state   - 	Total of 18 seizures captured: 9 electro-clinical seizures with right hemispheric onset (clinical semiology unclear), 3 electrographic seizures with right hemispheric onset and 6 electrographic seizures with left hemispheric onset.  -          Moderate background slowing     EEG Impression/Clinical Correlate:  1.	There were 18 electroclinical and subclinical seizures captured over the span of ~3hrs: 12 from the right hemisphere and 6 with onset in the left hemisphere.   2.	There is evidence of moderate nonspecific diffuse or multifocal cerebral dysfunction    TELE--NSR    ASSESSMENT/PLAN: 	54 y/o female with PmHx of cerebral palsy, intellectual disability, seizures, non-verbal at baseline, presented to [facility] ED after 4 seizures in less than 24 hours.  Cardiology consulted for abnormal EKG     --The patient has ruled out for acute coronary syndrome with negative serial cardiac enzymes  --EKG with NSR narrow QRS without acute ischemia   --Check TTE to assess LV function    --orthostatics negative  --tele events noted, with Estela while in ICU, avoid AVN blockers    --EP eval noted      Madiha Domínguez PA-C

## 2018-06-20 NOTE — PROVIDER CONTACT NOTE (OTHER) - ACTION/TREATMENT ORDERED:
ADS NP made aware, neurology contacted. Pt switched from PO seizure meds to IV meds. EEG tech will be called in AM to expedite EEG. Notify provider if seizure is >2mins, will administer ativan IV push
None of the ANMs or Nurse managers who are trained in US IV placement are not available today. HS 2 to come and place US IV.
Given stat doses of keppra and depakote as ordered. Hypothermia blanket placed.
Pt will be transferred to MICU.
Neurology called. Requesting 24h veeg
Dr. Daniels ordered stat dose.

## 2018-06-21 LAB
BACTERIA BLD CULT: SIGNIFICANT CHANGE UP
BUN SERPL-MCNC: 5 MG/DL — LOW (ref 7–23)
CALCIUM SERPL-MCNC: 8.7 MG/DL — SIGNIFICANT CHANGE UP (ref 8.4–10.5)
CHLORIDE SERPL-SCNC: 100 MMOL/L — SIGNIFICANT CHANGE UP (ref 98–107)
CO2 SERPL-SCNC: 24 MMOL/L — SIGNIFICANT CHANGE UP (ref 22–31)
CREAT SERPL-MCNC: 0.5 MG/DL — SIGNIFICANT CHANGE UP (ref 0.5–1.3)
GLUCOSE SERPL-MCNC: 92 MG/DL — SIGNIFICANT CHANGE UP (ref 70–99)
HCT VFR BLD CALC: 37.8 % — SIGNIFICANT CHANGE UP (ref 34.5–45)
HGB BLD-MCNC: 12.2 G/DL — SIGNIFICANT CHANGE UP (ref 11.5–15.5)
MAGNESIUM SERPL-MCNC: 1.7 MG/DL — SIGNIFICANT CHANGE UP (ref 1.6–2.6)
MCHC RBC-ENTMCNC: 32.3 % — SIGNIFICANT CHANGE UP (ref 32–36)
MCHC RBC-ENTMCNC: 33.4 PG — SIGNIFICANT CHANGE UP (ref 27–34)
MCV RBC AUTO: 103.6 FL — HIGH (ref 80–100)
NRBC # FLD: 0 — SIGNIFICANT CHANGE UP
PHOSPHATE SERPL-MCNC: 4 MG/DL — SIGNIFICANT CHANGE UP (ref 2.5–4.5)
PLATELET # BLD AUTO: 157 K/UL — SIGNIFICANT CHANGE UP (ref 150–400)
PMV BLD: 9.8 FL — SIGNIFICANT CHANGE UP (ref 7–13)
POTASSIUM SERPL-MCNC: 3.8 MMOL/L — SIGNIFICANT CHANGE UP (ref 3.5–5.3)
POTASSIUM SERPL-SCNC: 3.8 MMOL/L — SIGNIFICANT CHANGE UP (ref 3.5–5.3)
RBC # BLD: 3.65 M/UL — LOW (ref 3.8–5.2)
RBC # FLD: 15.7 % — HIGH (ref 10.3–14.5)
SODIUM SERPL-SCNC: 142 MMOL/L — SIGNIFICANT CHANGE UP (ref 135–145)
WBC # BLD: 6.7 K/UL — SIGNIFICANT CHANGE UP (ref 3.8–10.5)
WBC # FLD AUTO: 6.7 K/UL — SIGNIFICANT CHANGE UP (ref 3.8–10.5)

## 2018-06-21 RX ADMIN — OLANZAPINE 7.5 MILLIGRAM(S): 15 TABLET, FILM COATED ORAL at 00:01

## 2018-06-21 RX ADMIN — Medication 1 APPLICATION(S): at 05:25

## 2018-06-21 RX ADMIN — Medication 1 SPRAY(S): at 18:22

## 2018-06-21 RX ADMIN — ENOXAPARIN SODIUM 40 MILLIGRAM(S): 100 INJECTION SUBCUTANEOUS at 11:09

## 2018-06-21 RX ADMIN — Medication 300 MILLIGRAM(S): at 21:29

## 2018-06-21 RX ADMIN — LEVETIRACETAM 1000 MILLIGRAM(S): 250 TABLET, FILM COATED ORAL at 18:21

## 2018-06-21 RX ADMIN — Medication 3 MILLILITER(S): at 04:35

## 2018-06-21 RX ADMIN — Medication 3 MILLILITER(S): at 10:28

## 2018-06-21 RX ADMIN — Medication 2 DROP(S): at 13:08

## 2018-06-21 RX ADMIN — LACTULOSE 10 GRAM(S): 10 SOLUTION ORAL at 06:15

## 2018-06-21 RX ADMIN — Medication 0.5 MILLIGRAM(S): at 18:22

## 2018-06-21 RX ADMIN — Medication 2 DROP(S): at 05:25

## 2018-06-21 RX ADMIN — Medication 1 SPRAY(S): at 06:15

## 2018-06-21 RX ADMIN — Medication 300 MILLIGRAM(S): at 00:01

## 2018-06-21 RX ADMIN — Medication 0.5 MILLIGRAM(S): at 10:28

## 2018-06-21 RX ADMIN — Medication 0.5 MILLIGRAM(S): at 06:14

## 2018-06-21 RX ADMIN — Medication 81 MILLIGRAM(S): at 11:09

## 2018-06-21 RX ADMIN — LEVETIRACETAM 1000 MILLIGRAM(S): 250 TABLET, FILM COATED ORAL at 06:15

## 2018-06-21 RX ADMIN — Medication 1 APPLICATION(S): at 18:22

## 2018-06-21 RX ADMIN — NYSTATIN CREAM 1 APPLICATION(S): 100000 CREAM TOPICAL at 18:22

## 2018-06-21 RX ADMIN — Medication 3 MILLILITER(S): at 22:22

## 2018-06-21 RX ADMIN — OLANZAPINE 7.5 MILLIGRAM(S): 15 TABLET, FILM COATED ORAL at 21:28

## 2018-06-21 RX ADMIN — Medication 2 DROP(S): at 21:29

## 2018-06-21 RX ADMIN — Medication 2 DROP(S): at 00:01

## 2018-06-21 RX ADMIN — NYSTATIN CREAM 1 APPLICATION(S): 100000 CREAM TOPICAL at 06:15

## 2018-06-21 NOTE — PROGRESS NOTE ADULT - SUBJECTIVE AND OBJECTIVE BOX
NAYANA JACQUES  53y  Female  Patient is a 53y old  Female who presents with a chief complaint of Status Epilepticus (17 Jun 2018 13:52)    INTERVAL HPI/OVERNIGHT EVENTS:    No overnight events. No seizures overnight. Had one episode of diarrhea this morning. US guided IV placed yesterday.    T(C): 36.6 (06-21-18 @ 05:24), Max: 36.7 (06-21-18 @ 00:00)  HR: 98 (06-21-18 @ 05:24) (68 - 98)  BP: 125/93 (06-21-18 @ 05:24) (121/78 - 141/94)  RR: 18 (06-21-18 @ 05:24) (18 - 19)  SpO2: 95% (06-21-18 @ 05:24) (94% - 98%)  Wt(kg): --Vital Signs Last 24 Hrs  T(C): 36.6 (21 Jun 2018 05:24), Max: 36.7 (21 Jun 2018 00:00)  T(F): 97.8 (21 Jun 2018 05:24), Max: 98.1 (21 Jun 2018 00:00)  HR: 98 (21 Jun 2018 05:24) (68 - 98)  BP: 125/93 (21 Jun 2018 05:24) (121/78 - 141/94)  BP(mean): --  RR: 18 (21 Jun 2018 05:24) (18 - 19)  SpO2: 95% (21 Jun 2018 05:24) (94% - 98%)    PHYSICAL EXAM:    GENERAL: NAD, well-developed  HEAD:  Atraumatic, Normocephalic  CHEST/LUNG: Breathing comfortably  HEART: Regular rate and rhythm on pulse, distant heart sounds  ABDOMEN: Soft, Nontender, Nondistended; no guarding or rigidity  EXTREMITIES: No LE edema, unable to assess ROM  PSYCH: Poorly responsive  NEUROLOGY: Unable to assess as pt is poorly responsive and does not follow commands  SKIN: No rashes or lesions    LABS:                        12.2   6.70  )-----------( 157      ( 21 Jun 2018 07:00 )             37.8     06-20    144  |  101  |  4<L>  ----------------------------<  50<L>  5.1   |  26  |  0.51    Ca    9.2      20 Jun 2018 06:12  Phos  3.4     06-20  Mg     1.7     06-20    RADIOLOGY & ADDITIONAL TESTS:    No interval imaging.

## 2018-06-21 NOTE — PROGRESS NOTE ADULT - SUBJECTIVE AND OBJECTIVE BOX
pt appears comfortable, no signs of acute distress        non verbal         MEDICATIONS  (STANDING):  ALBUTerol    90 MICROgram(s) HFA Inhaler 1 Puff(s) Inhalation every 4 hours  ALBUTerol/ipratropium for Nebulization 3 milliLiter(s) Nebulizer every 6 hours  AQUAPHOR (petrolatum Ointment) 1 Application(s) Topical two times a day  artificial  tears Solution 2 Drop(s) Both EYES three times a day  aspirin enteric coated 81 milliGRAM(s) Oral daily  benztropine 0.5 milliGRAM(s) Oral two times a day  buDESOnide   0.5 milliGRAM(s) Respule 0.5 milliGRAM(s) Inhalation daily  clotrimazole 1% Cream 1 Application(s) Topical two times a day  dextrose 5% + sodium chloride 0.45%. 1000 milliLiter(s) (75 mL/Hr) IV Continuous <Continuous>  enoxaparin Injectable 40 milliGRAM(s) SubCutaneous daily  fluticasone propionate 50 MICROgram(s)/spray Nasal Spray 1 Spray(s) Both Nostrils two times a day  lactulose Syrup 10 Gram(s) Oral two times a day  levETIRAcetam  Solution 1000 milliGRAM(s) Oral two times a day  nystatin Powder 1 Application(s) Topical two times a day  OLANZapine 7.5 milliGRAM(s) Oral at bedtime  phenytoin   Suspension 300 milliGRAM(s) Oral at bedtime  tiotropium 18 MICROgram(s) Capsule 1 Capsule(s) Inhalation daily    MEDICATIONS  (PRN):  LORazepam   Injectable 0.5 milliGRAM(s) IV Push every 6 hours PRN seizure activity < 30seconds  LORazepam   Injectable 1 milliGRAM(s) IV Push every 6 hours PRN seizure activity > 30 seconds      LABS:                        12.2   6.70  )-----------( 157      ( 21 Jun 2018 07:00 )             37.8     Hemoglobin: 12.2 g/dL (06-21 @ 07:00)  Hemoglobin: 11.7 g/dL (06-20 @ 06:12)  Hemoglobin: 11.0 g/dL (06-19 @ 07:23)  Hemoglobin: 10.7 g/dL (06-18 @ 09:17)  Hemoglobin: 11.5 g/dL (06-17 @ 07:00)    06-21    142  |  100  |  5<L>  ----------------------------<  92  3.8   |  24  |  0.50    Ca    8.7      21 Jun 2018 07:00  Phos  4.0     06-21  Mg     1.7     06-21      Creatinine Trend: 0.50<--, 0.51<--, 0.51<--, 0.51<--, 0.54<--, 0.51<--           PHYSICAL EXAM  Vital Signs Last 24 Hrs  T(C): 37.1 (21 Jun 2018 14:38), Max: 37.1 (21 Jun 2018 14:38)  T(F): 98.8 (21 Jun 2018 14:38), Max: 98.8 (21 Jun 2018 14:38)  HR: 68 (21 Jun 2018 14:38) (66 - 98)  BP: 99/64 (21 Jun 2018 14:38) (99/64 - 141/94)  BP(mean): --  RR: 18 (21 Jun 2018 14:38) (18 - 19)  SpO2: 95% (21 Jun 2018 14:38) (94% - 98%)    Cardiovascular: Normal S1 S2, RRR  No JVD  Respiratory: Lungs clear to auscultation, normal effort 	  Gastrointestinal:  Soft, Non-tender, + BS	  Extremities no edema, cyanosis, clubbing B/L LE's      RADIOLOGY:     < from: Xray Chest 1 View- PORTABLE-Urgent (06.06.18 @ 18:48) >  IMPRESSION:  Chin overlie and obscures portion of lung apices.    Uniform hazy opacity in peripheral lower left hemithorax obscuring   lateral left hemidiaphragm CP angle and left heart border related to   prominent epicardial fat as demonstrated on chestCT from 5/9/2018.     Sharp right CP angle. Clear remaining visualized lungs. No pneumothorax.    Heart size and mediastinal width inaccurately assessed on this projection.    Spinal degenerative changes again noted.    < end of copied text >    < from: CT Head No Cont (06.07.18 @ 02:40) >  Impression:     No acute intracranial hemorrhage, large cortical infarct or mass effect.   No significant interval change since 5/8/2018. If clinically indicated,   short-term follow-up or MRI may be obtained for further evaluation.    < end of copied text >  	  EEG   EEG Summary/Classification:  Abnormal EEG in awake state   - 	Total of 18 seizures captured: 9 electro-clinical seizures with right hemispheric onset (clinical semiology unclear), 3 electrographic seizures with right hemispheric onset and 6 electrographic seizures with left hemispheric onset.  -          Moderate background slowing     EEG Impression/Clinical Correlate:  1.	There were 18 electroclinical and subclinical seizures captured over the span of ~3hrs: 12 from the right hemisphere and 6 with onset in the left hemisphere.   2.	There is evidence of moderate nonspecific diffuse or multifocal cerebral dysfunction    TELE--NSR    ASSESSMENT/PLAN: 	54 y/o female with PmHx of cerebral palsy, intellectual disability, seizures, non-verbal at baseline, presented to [facility] ED after 4 seizures in less than 24 hours.  Cardiology consulted for abnormal EKG     --The patient has ruled out for acute coronary syndrome with negative serial cardiac enzymes  --EKG with NSR narrow QRS without acute ischemia   --Check TTE to assess LV function       can be done as outpt if unable to do in house    --orthostatics negative  --tele events noted, with Estela while in ICU, avoid AVN blockers    --EP input noted

## 2018-06-21 NOTE — PROGRESS NOTE ADULT - ASSESSMENT
54 yo F with CP, seizure disorder presents with status epilepticus in the setting of recent decrease in anti-epileptic medication, now s/p MICU stay, without seizures >48hrs.

## 2018-06-21 NOTE — PROGRESS NOTE ADULT - PROBLEM SELECTOR PLAN 1
- pt p/w recurrent seizures in the setting of AED dose reduction  - c/w fosphenytoin 300mg qhs, keppra 1000mg oral q12  - no seizure activity >48hrs  - cont to f/u neuro recs

## 2018-06-22 LAB
BUN SERPL-MCNC: 11 MG/DL — SIGNIFICANT CHANGE UP (ref 7–23)
CALCIUM SERPL-MCNC: 8.5 MG/DL — SIGNIFICANT CHANGE UP (ref 8.4–10.5)
CHLORIDE SERPL-SCNC: 105 MMOL/L — SIGNIFICANT CHANGE UP (ref 98–107)
CO2 SERPL-SCNC: 30 MMOL/L — SIGNIFICANT CHANGE UP (ref 22–31)
CREAT SERPL-MCNC: 0.63 MG/DL — SIGNIFICANT CHANGE UP (ref 0.5–1.3)
GLUCOSE SERPL-MCNC: 79 MG/DL — SIGNIFICANT CHANGE UP (ref 70–99)
HCT VFR BLD CALC: 34.6 % — SIGNIFICANT CHANGE UP (ref 34.5–45)
HGB BLD-MCNC: 10.5 G/DL — LOW (ref 11.5–15.5)
MAGNESIUM SERPL-MCNC: 1.8 MG/DL — SIGNIFICANT CHANGE UP (ref 1.6–2.6)
MCHC RBC-ENTMCNC: 30.3 % — LOW (ref 32–36)
MCHC RBC-ENTMCNC: 32.9 PG — SIGNIFICANT CHANGE UP (ref 27–34)
MCV RBC AUTO: 108.5 FL — HIGH (ref 80–100)
NRBC # FLD: 0.02 — SIGNIFICANT CHANGE UP
PHOSPHATE SERPL-MCNC: 3.5 MG/DL — SIGNIFICANT CHANGE UP (ref 2.5–4.5)
PLATELET # BLD AUTO: 175 K/UL — SIGNIFICANT CHANGE UP (ref 150–400)
PMV BLD: 9.7 FL — SIGNIFICANT CHANGE UP (ref 7–13)
POTASSIUM SERPL-MCNC: 4 MMOL/L — SIGNIFICANT CHANGE UP (ref 3.5–5.3)
POTASSIUM SERPL-SCNC: 4 MMOL/L — SIGNIFICANT CHANGE UP (ref 3.5–5.3)
RBC # BLD: 3.19 M/UL — LOW (ref 3.8–5.2)
RBC # FLD: 15.7 % — HIGH (ref 10.3–14.5)
SODIUM SERPL-SCNC: 147 MMOL/L — HIGH (ref 135–145)
WBC # BLD: 7.87 K/UL — SIGNIFICANT CHANGE UP (ref 3.8–10.5)
WBC # FLD AUTO: 7.87 K/UL — SIGNIFICANT CHANGE UP (ref 3.8–10.5)

## 2018-06-22 PROCEDURE — 74230 X-RAY XM SWLNG FUNCJ C+: CPT | Mod: 26

## 2018-06-22 RX ORDER — SODIUM CHLORIDE 9 MG/ML
1000 INJECTION, SOLUTION INTRAVENOUS
Refills: 0 | Status: DISCONTINUED | OUTPATIENT
Start: 2018-06-22 | End: 2018-06-23

## 2018-06-22 RX ADMIN — NYSTATIN CREAM 1 APPLICATION(S): 100000 CREAM TOPICAL at 17:06

## 2018-06-22 RX ADMIN — Medication 0.5 MILLIGRAM(S): at 17:07

## 2018-06-22 RX ADMIN — Medication 1 APPLICATION(S): at 17:06

## 2018-06-22 RX ADMIN — Medication 81 MILLIGRAM(S): at 11:30

## 2018-06-22 RX ADMIN — Medication 1 SPRAY(S): at 06:14

## 2018-06-22 RX ADMIN — Medication 1 APPLICATION(S): at 06:14

## 2018-06-22 RX ADMIN — Medication 3 MILLILITER(S): at 21:55

## 2018-06-22 RX ADMIN — NYSTATIN CREAM 1 APPLICATION(S): 100000 CREAM TOPICAL at 06:14

## 2018-06-22 RX ADMIN — LEVETIRACETAM 1000 MILLIGRAM(S): 250 TABLET, FILM COATED ORAL at 17:07

## 2018-06-22 RX ADMIN — ENOXAPARIN SODIUM 40 MILLIGRAM(S): 100 INJECTION SUBCUTANEOUS at 11:30

## 2018-06-22 RX ADMIN — Medication 3 MILLILITER(S): at 05:01

## 2018-06-22 RX ADMIN — LEVETIRACETAM 1000 MILLIGRAM(S): 250 TABLET, FILM COATED ORAL at 06:14

## 2018-06-22 RX ADMIN — Medication 2 DROP(S): at 21:32

## 2018-06-22 RX ADMIN — Medication 2 DROP(S): at 16:58

## 2018-06-22 RX ADMIN — Medication 0.5 MILLIGRAM(S): at 06:14

## 2018-06-22 RX ADMIN — SODIUM CHLORIDE 75 MILLILITER(S): 9 INJECTION, SOLUTION INTRAVENOUS at 07:37

## 2018-06-22 RX ADMIN — OLANZAPINE 7.5 MILLIGRAM(S): 15 TABLET, FILM COATED ORAL at 22:00

## 2018-06-22 RX ADMIN — Medication 2 DROP(S): at 06:14

## 2018-06-22 RX ADMIN — Medication 1 SPRAY(S): at 17:06

## 2018-06-22 RX ADMIN — Medication 300 MILLIGRAM(S): at 22:00

## 2018-06-22 RX ADMIN — Medication 1 APPLICATION(S): at 17:08

## 2018-06-22 NOTE — SWALLOW VFSS/MBS ASSESSMENT ADULT - ORAL PHASE
Delayed oral transit time/Reduced anterior - posterior transport/Uncontrolled bolus / spillover in em-pharynx Reduced anterior - posterior transport/Uncontrolled bolus / spillover in hypopharynx

## 2018-06-22 NOTE — SWALLOW VFSS/MBS ASSESSMENT ADULT - ADDITIONAL INFORMATION
Suboptimal viewing due to pt body positioning/habitus. Pt unable to remain positioned in optimal upright fashion despite repositioning provided by radiology technicians, SLPs and verbal prompts.  Pt remained nonverbal throughout evaluation.

## 2018-06-22 NOTE — SWALLOW VFSS/MBS ASSESSMENT ADULT - SPECIFY REASON(S)
to objectively assess swallow function. pt is familiar to this service as she was seen for a clinical assessment of swallow function during this admission (please see report for details)

## 2018-06-22 NOTE — PROGRESS NOTE ADULT - SUBJECTIVE AND OBJECTIVE BOX
pt appears comfortable, no signs of acute distress        non verbal        noted events overnight -pulled out IV -      MEDICATIONS  (STANDING):  ALBUTerol    90 MICROgram(s) HFA Inhaler 1 Puff(s) Inhalation every 4 hours  ALBUTerol/ipratropium for Nebulization 3 milliLiter(s) Nebulizer every 6 hours  AQUAPHOR (petrolatum Ointment) 1 Application(s) Topical two times a day  artificial  tears Solution 2 Drop(s) Both EYES three times a day  aspirin enteric coated 81 milliGRAM(s) Oral daily  benztropine 0.5 milliGRAM(s) Oral two times a day  buDESOnide   0.5 milliGRAM(s) Respule 0.5 milliGRAM(s) Inhalation daily  clotrimazole 1% Cream 1 Application(s) Topical two times a day  dextrose 5%. 1000 milliLiter(s) (75 mL/Hr) IV Continuous <Continuous>  enoxaparin Injectable 40 milliGRAM(s) SubCutaneous daily  fluticasone propionate 50 MICROgram(s)/spray Nasal Spray 1 Spray(s) Both Nostrils two times a day  lactulose Syrup 10 Gram(s) Oral two times a day  levETIRAcetam  Solution 1000 milliGRAM(s) Oral two times a day  nystatin Powder 1 Application(s) Topical two times a day  OLANZapine 7.5 milliGRAM(s) Oral at bedtime  phenytoin   Suspension 300 milliGRAM(s) Oral at bedtime  tiotropium 18 MICROgram(s) Capsule 1 Capsule(s) Inhalation daily    MEDICATIONS  (PRN):  LORazepam   Injectable 0.5 milliGRAM(s) IV Push every 6 hours PRN seizure activity < 30seconds  LORazepam   Injectable 1 milliGRAM(s) IV Push every 6 hours PRN seizure activity > 30 seconds      LABS:                        10.5   7.87  )-----------( 175      ( 22 Jun 2018 06:00 )             34.6     Hemoglobin: 10.5 g/dL (06-22 @ 06:00)  Hemoglobin: 12.2 g/dL (06-21 @ 07:00)  Hemoglobin: 11.7 g/dL (06-20 @ 06:12)  Hemoglobin: 11.0 g/dL (06-19 @ 07:23)  Hemoglobin: 10.7 g/dL (06-18 @ 09:17)    06-22    147<H>  |  105  |  11  ----------------------------<  79  4.0   |  30  |  0.63    Ca    8.5      22 Jun 2018 06:00  Phos  3.5     06-22  Mg     1.8     06-22      Creatinine Trend: 0.63<--, 0.50<--, 0.51<--, 0.51<--, 0.51<--, 0.54<--           PHYSICAL EXAM  Vital Signs Last 24 Hrs  T(C): 37 (22 Jun 2018 05:30), Max: 37.1 (21 Jun 2018 14:38)  T(F): 98.6 (22 Jun 2018 05:30), Max: 98.8 (21 Jun 2018 14:38)  HR: 86 (22 Jun 2018 05:30) (68 - 89)  BP: 104/66 (22 Jun 2018 05:30) (83/39 - 104/66)  BP(mean): --  RR: 18 (22 Jun 2018 05:30) (18 - 20)  SpO2: 98% (22 Jun 2018 05:30) (93% - 98%)      Cardiovascular: Normal S1 S2, RRR  No JVD  Respiratory: Lungs clear to auscultation, normal effort 	  Gastrointestinal:  Soft, Non-tender, + BS	  Extremities no edema, cyanosis, clubbing B/L LE's      RADIOLOGY:     < from: Xray Chest 1 View- PORTABLE-Urgent (06.06.18 @ 18:48) >  IMPRESSION:  Chin overlie and obscures portion of lung apices.    Uniform hazy opacity in peripheral lower left hemithorax obscuring   lateral left hemidiaphragm CP angle and left heart border related to   prominent epicardial fat as demonstrated on chestCT from 5/9/2018.     Sharp right CP angle. Clear remaining visualized lungs. No pneumothorax.    Heart size and mediastinal width inaccurately assessed on this projection.    Spinal degenerative changes again noted.    < end of copied text >    < from: CT Head No Cont (06.07.18 @ 02:40) >  Impression:     No acute intracranial hemorrhage, large cortical infarct or mass effect.   No significant interval change since 5/8/2018. If clinically indicated,   short-term follow-up or MRI may be obtained for further evaluation.    < end of copied text >  	  EEG   EEG Summary/Classification:  Abnormal EEG in awake state   - 	Total of 18 seizures captured: 9 electro-clinical seizures with right hemispheric onset (clinical semiology unclear), 3 electrographic seizures with right hemispheric onset and 6 electrographic seizures with left hemispheric onset.  -          Moderate background slowing     EEG Impression/Clinical Correlate:  1.	There were 18 electroclinical and subclinical seizures captured over the span of ~3hrs: 12 from the right hemisphere and 6 with onset in the left hemisphere.   2.	There is evidence of moderate nonspecific diffuse or multifocal cerebral dysfunction    TELE--NSR    ASSESSMENT/PLAN: 	54 y/o female with PmHx of cerebral palsy, intellectual disability, seizures, non-verbal at baseline, presented to [facility] ED after 4 seizures in less than 24 hours.  Cardiology consulted for abnormal EKG     --The patient has ruled out for acute coronary syndrome with negative serial cardiac enzymes  --EKG with NSR narrow QRS without acute ischemia   --Check TTE to assess LV function       can be done as outpt if unable to do in house    --orthostatics negative  --tele events noted, with Bennie while in ICU, avoid AVN blockers    --EP input noted

## 2018-06-22 NOTE — PROGRESS NOTE ADULT - NSHPATTENDINGPLANDISCUSS_GEN_ALL_CORE
MICU staff
MICU team
pts mother in detail.
resident

## 2018-06-22 NOTE — SWALLOW VFSS/MBS ASSESSMENT ADULT - DIAGNOSTIC IMPRESSIONS
1- mild oral dysphagia given solid and puree textures marked by delayed bolus collection, transfer and transport with posterior loss to the oropharynx. 2- mild-moderate oral dysphagia given honey thick liquid, nectar thick liquid and thin liquid textures marked by delayed bolus collection, transfer and transport with posterior loss extending from the em-hypopharynx across trials. anterior loss also observed with thin liquid textures. 3- mild pharyngeal dysphagia given solid, puree and honey thick liquids marked by delayed swallow trigger, reduced tongue base propulsion, reduced hyolaryngeal excursion and reduced pharyngeal contractility resulting in trace-mild stasis at the valleculae. no penetration/aspiration viewed. 4- moderate-severe pharyngeal dysphagia given nectar thick and thin liquid textures marked by delayed swallow trigger, reduced tongue base propulsion, reduced hyolaryngeal excursion and reduced pharyngeal contractility resulting in incomplete closure of the laryngeal vestibule with subsequent penetration with and without full retrieval. pt is not sensitive to penetrated material as marked by absent cough response. trace-mild stasis remained post swallow at the level of the valleculae. 5- unable to incorporate compensatory strategies as pt unable to follow low-level, verbally presented directives at this time.

## 2018-06-22 NOTE — SWALLOW VFSS/MBS ASSESSMENT ADULT - COMMENTS
Pt was received by radiology awake, cooperative and accompanied by aide from [facility] this am. Per charting, pt is a 53F PMH cerebral palsy, seizures, nonverbal baseline, presented to [facility] on 6/7/18 for recurrent seizures with note of 28 seizure clusters in one day reported. Recent CXR revealed "No interval change from the last exam with hazy left lung base obscuring the hemidiaphragm representing epicardial fat as seen on CT chest of May 9. The left upper lobe and right lung are clear. Heart is not enlarged. No pneumothorax. "

## 2018-06-22 NOTE — PROGRESS NOTE ADULT - ASSESSMENT
52 yo F with CP, seizure disorder presents with status epilepticus in the setting of recent decrease in anti-epileptic medication, now s/p MICU stay, without seizures >48hrs.

## 2018-06-22 NOTE — PROGRESS NOTE ADULT - SUBJECTIVE AND OBJECTIVE BOX
NAYANA JACQUES  53y  Female  Patient is a 53y old  Female who presents with a chief complaint of Status Epilepticus (17 Jun 2018 13:52)    INTERVAL HPI/OVERNIGHT EVENTS:    No reported seizure activity overnight. Reportedly desaturated to 88 overnight, placed on NRB. This AM on exam pt was not cooperative, agitated when placing pulse oximeter on finger so unable to get a reading. However, pt was not in any respiratory distress, breathing unlabored, lungs clear anteriorly, low suspicion for pulmonary pathology.    T(C): 37 (06-22-18 @ 05:30), Max: 37.1 (06-21-18 @ 14:38)  HR: 86 (06-22-18 @ 05:30) (66 - 89)  BP: 104/66 (06-22-18 @ 05:30) (83/39 - 104/66)  RR: 18 (06-22-18 @ 05:30) (18 - 20)  SpO2: 98% (06-22-18 @ 05:30) (93% - 98%)  Wt(kg): --Vital Signs Last 24 Hrs  T(C): 37 (22 Jun 2018 05:30), Max: 37.1 (21 Jun 2018 14:38)  T(F): 98.6 (22 Jun 2018 05:30), Max: 98.8 (21 Jun 2018 14:38)  HR: 86 (22 Jun 2018 05:30) (66 - 89)  BP: 104/66 (22 Jun 2018 05:30) (83/39 - 104/66)  BP(mean): --  RR: 18 (22 Jun 2018 05:30) (18 - 20)  SpO2: 98% (22 Jun 2018 05:30) (93% - 98%)    PHYSICAL EXAM:    GENERAL: NAD, well-developed  HEAD:  Atraumatic, Normocephalic  CHEST/LUNG: Breathing comfortably; CTAB anteriorly  HEART: Regular rate and rhythm on pulse, distant heart sounds  ABDOMEN: Soft, Nontender, Nondistended; no guarding or rigidity  EXTREMITIES: No LE edema, unable to assess ROM  PSYCH: Poorly responsive, agitated on exam  NEUROLOGY: Unable to assess as pt is poorly responsive and does not follow commands  SKIN: No rashes or lesions    LABS:                        10.5   7.87  )-----------( 175      ( 22 Jun 2018 06:00 )             34.6     06-22    147<H>  |  105  |  11  ----------------------------<  79  4.0   |  30  |  0.63    Ca    8.5      22 Jun 2018 06:00  Phos  3.5     06-22  Mg     1.8     06-22    RADIOLOGY & ADDITIONAL TESTS:    No interval imaging.

## 2018-06-22 NOTE — SWALLOW VFSS/MBS ASSESSMENT ADULT - RECOMMENDED FEEDING/EATING TECHNIQUES
allow for swallow between intakes/alternate food with liquid/check mouth frequently for oral residue/pocketing/crush medication (when feasible)/hard swallow w/ each bite or sip/maintain upright posture during/after eating for 30 mins/no straws/oral hygiene/position upright (90 degrees)/provide rest periods between swallows/small sips/bites

## 2018-06-23 VITALS — OXYGEN SATURATION: 96 %

## 2018-06-23 LAB
BUN SERPL-MCNC: 15 MG/DL — SIGNIFICANT CHANGE UP (ref 7–23)
CALCIUM SERPL-MCNC: 8.6 MG/DL — SIGNIFICANT CHANGE UP (ref 8.4–10.5)
CHLORIDE SERPL-SCNC: 104 MMOL/L — SIGNIFICANT CHANGE UP (ref 98–107)
CO2 SERPL-SCNC: 31 MMOL/L — SIGNIFICANT CHANGE UP (ref 22–31)
CREAT SERPL-MCNC: 0.61 MG/DL — SIGNIFICANT CHANGE UP (ref 0.5–1.3)
GLUCOSE SERPL-MCNC: 88 MG/DL — SIGNIFICANT CHANGE UP (ref 70–99)
HCT VFR BLD CALC: 36.2 % — SIGNIFICANT CHANGE UP (ref 34.5–45)
HGB BLD-MCNC: 11 G/DL — LOW (ref 11.5–15.5)
MAGNESIUM SERPL-MCNC: 1.9 MG/DL — SIGNIFICANT CHANGE UP (ref 1.6–2.6)
MCHC RBC-ENTMCNC: 30.4 % — LOW (ref 32–36)
MCHC RBC-ENTMCNC: 32.9 PG — SIGNIFICANT CHANGE UP (ref 27–34)
MCV RBC AUTO: 108.4 FL — HIGH (ref 80–100)
NRBC # FLD: 0 — SIGNIFICANT CHANGE UP
PHOSPHATE SERPL-MCNC: 3.1 MG/DL — SIGNIFICANT CHANGE UP (ref 2.5–4.5)
PLATELET # BLD AUTO: 199 K/UL — SIGNIFICANT CHANGE UP (ref 150–400)
PMV BLD: 9.7 FL — SIGNIFICANT CHANGE UP (ref 7–13)
POTASSIUM SERPL-MCNC: 4.1 MMOL/L — SIGNIFICANT CHANGE UP (ref 3.5–5.3)
POTASSIUM SERPL-SCNC: 4.1 MMOL/L — SIGNIFICANT CHANGE UP (ref 3.5–5.3)
RBC # BLD: 3.34 M/UL — LOW (ref 3.8–5.2)
RBC # FLD: 15.5 % — HIGH (ref 10.3–14.5)
SODIUM SERPL-SCNC: 145 MMOL/L — SIGNIFICANT CHANGE UP (ref 135–145)
WBC # BLD: 6.68 K/UL — SIGNIFICANT CHANGE UP (ref 3.8–10.5)
WBC # FLD AUTO: 6.68 K/UL — SIGNIFICANT CHANGE UP (ref 3.8–10.5)

## 2018-06-23 RX ORDER — NYSTATIN CREAM 100000 [USP'U]/G
1 CREAM TOPICAL
Qty: 1 | Refills: 0
Start: 2018-06-23

## 2018-06-23 RX ORDER — ALBUTEROL 90 UG/1
3 AEROSOL, METERED ORAL
Qty: 180 | Refills: 0
Start: 2018-06-23 | End: 2018-07-22

## 2018-06-23 RX ORDER — MAGNESIUM HYDROXIDE 400 MG/1
30 TABLET, CHEWABLE ORAL
Qty: 900 | Refills: 0
Start: 2018-06-23 | End: 2018-07-22

## 2018-06-23 RX ORDER — LEVETIRACETAM 250 MG/1
10 TABLET, FILM COATED ORAL
Qty: 600 | Refills: 0
Start: 2018-06-23 | End: 2018-07-22

## 2018-06-23 RX ORDER — FLUTICASONE PROPIONATE 50 MCG
1 SPRAY, SUSPENSION NASAL
Qty: 30 | Refills: 0
Start: 2018-06-23 | End: 2018-07-22

## 2018-06-23 RX ORDER — THIAMINE MONONITRATE (VIT B1) 100 MG
1 TABLET ORAL
Qty: 30 | Refills: 0
Start: 2018-06-23 | End: 2018-07-22

## 2018-06-23 RX ORDER — ESOMEPRAZOLE MAGNESIUM 40 MG/1
1 CAPSULE, DELAYED RELEASE ORAL
Qty: 30 | Refills: 0
Start: 2018-06-23 | End: 2018-07-22

## 2018-06-23 RX ORDER — GUAIFENESIN/DEXTROMETHORPHAN 600MG-30MG
2 TABLET, EXTENDED RELEASE 12 HR ORAL
Qty: 240 | Refills: 0
Start: 2018-06-23 | End: 2018-07-22

## 2018-06-23 RX ORDER — CLINDAMYCIN PHOSPHATE GEL USP, 1% 10 MG/G
1 GEL TOPICAL
Qty: 1 | Refills: 0
Start: 2018-06-23 | End: 2018-07-22

## 2018-06-23 RX ORDER — LEVETIRACETAM 250 MG/1
10 TABLET, FILM COATED ORAL
Qty: 0 | Refills: 0 | DISCHARGE
Start: 2018-06-23

## 2018-06-23 RX ORDER — METRONIDAZOLE 7.5 MG/G
1 GEL VAGINAL
Qty: 1 | Refills: 0
Start: 2018-06-23

## 2018-06-23 RX ORDER — DOCUSATE SODIUM 100 MG
3 CAPSULE ORAL
Qty: 90 | Refills: 0
Start: 2018-06-23 | End: 2018-07-22

## 2018-06-23 RX ORDER — PETROLATUM,WHITE
1 JELLY (GRAM) TOPICAL
Qty: 1 | Refills: 0
Start: 2018-06-23

## 2018-06-23 RX ORDER — BENZTROPINE MESYLATE 1 MG
1 TABLET ORAL
Qty: 60 | Refills: 0
Start: 2018-06-23 | End: 2018-07-22

## 2018-06-23 RX ORDER — POLYETHYLENE GLYCOL 3350 17 G/17G
17 POWDER, FOR SOLUTION ORAL
Qty: 510 | Refills: 0
Start: 2018-06-23 | End: 2018-07-22

## 2018-06-23 RX ORDER — ASPIRIN/CALCIUM CARB/MAGNESIUM 324 MG
1 TABLET ORAL
Qty: 30 | Refills: 0
Start: 2018-06-23 | End: 2018-07-22

## 2018-06-23 RX ORDER — BUDESONIDE, MICRONIZED 100 %
2 POWDER (GRAM) MISCELLANEOUS
Qty: 60 | Refills: 0
Start: 2018-06-23 | End: 2018-07-22

## 2018-06-23 RX ORDER — SENNA PLUS 8.6 MG/1
2 TABLET ORAL
Qty: 60 | Refills: 0
Start: 2018-06-23 | End: 2018-07-22

## 2018-06-23 RX ORDER — OLANZAPINE 15 MG/1
1 TABLET, FILM COATED ORAL
Qty: 30 | Refills: 0
Start: 2018-06-23 | End: 2018-07-22

## 2018-06-23 RX ORDER — FERROUS SULFATE 325(65) MG
1 TABLET ORAL
Qty: 30 | Refills: 0
Start: 2018-06-23 | End: 2018-07-22

## 2018-06-23 RX ORDER — FOLIC ACID 0.8 MG
1 TABLET ORAL
Qty: 30 | Refills: 0
Start: 2018-06-23 | End: 2018-07-22

## 2018-06-23 RX ADMIN — Medication 0.5 MILLIGRAM(S): at 10:10

## 2018-06-23 RX ADMIN — Medication 0.5 MILLIGRAM(S): at 07:08

## 2018-06-23 RX ADMIN — Medication 2 DROP(S): at 07:08

## 2018-06-23 RX ADMIN — Medication 1 APPLICATION(S): at 07:08

## 2018-06-23 RX ADMIN — NYSTATIN CREAM 1 APPLICATION(S): 100000 CREAM TOPICAL at 07:08

## 2018-06-23 RX ADMIN — Medication 1 SPRAY(S): at 07:08

## 2018-06-23 RX ADMIN — Medication 3 MILLILITER(S): at 04:08

## 2018-06-23 RX ADMIN — Medication 3 MILLILITER(S): at 10:01

## 2018-06-23 RX ADMIN — LEVETIRACETAM 1000 MILLIGRAM(S): 250 TABLET, FILM COATED ORAL at 07:08

## 2018-06-23 NOTE — PROGRESS NOTE ADULT - SUBJECTIVE AND OBJECTIVE BOX
NAYANA JACQUES  53y  Female  Patient is a 53y old  Female who presents with a chief complaint of Status Epilepticus (17 Jun 2018 13:52)    INTERVAL HPI/OVERNIGHT EVENTS:    Pt tolerating PO. She took all her medications this AM without issues. No seizure activity. Responsive to verbal stimuli.    T(C): 36.9 (06-23-18 @ 07:05), Max: 36.9 (06-22-18 @ 21:30)  HR: 88 (06-23-18 @ 07:05) (70 - 88)  BP: 106/58 (06-23-18 @ 07:05) (106/58 - 129/73)  RR: 18 (06-23-18 @ 07:05) (18 - 18)  SpO2: 95% (06-23-18 @ 07:05) (95% - 97%)  Wt(kg): --Vital Signs Last 24 Hrs  T(C): 36.9 (23 Jun 2018 07:05), Max: 36.9 (22 Jun 2018 21:30)  T(F): 98.4 (23 Jun 2018 07:05), Max: 98.5 (22 Jun 2018 21:30)  HR: 88 (23 Jun 2018 07:05) (70 - 88)  BP: 106/58 (23 Jun 2018 07:05) (106/58 - 129/73)  BP(mean): --  RR: 18 (23 Jun 2018 07:05) (18 - 18)  SpO2: 95% (23 Jun 2018 07:05) (95% - 97%)    PHYSICAL EXAM:    GENERAL: NAD, well-developed  HEAD:  Atraumatic, Normocephalic  CHEST/LUNG: Breathing comfortably; CTAB anteriorly  HEART: Regular rate and rhythm on pulse, distant heart sounds  ABDOMEN: Soft, Nontender, Nondistended; no guarding or rigidity  EXTREMITIES: No LE edema, unable to assess ROM  PSYCH: Poorly responsive, agitated on exam  NEUROLOGY: Unable to assess as pt is poorly responsive and does not follow commands  SKIN: No rashes or lesions    LABS:                        11.0   6.68  )-----------( 199      ( 23 Jun 2018 06:43 )             36.2     06-23    145  |  104  |  15  ----------------------------<  88  4.1   |  31  |  0.61    Ca    8.6      23 Jun 2018 06:43  Phos  3.1     06-23  Mg     1.9     06-23    RADIOLOGY & ADDITIONAL TESTS:    No interval imaging.

## 2018-06-23 NOTE — PROGRESS NOTE ADULT - PROBLEM SELECTOR PLAN 2
- hypothermic in MICU to 95.5, possible side effect of valproic acid. - blood and urine cultures from 6/9 show no growth to date, RVP negative, CXR stable.  - cont to monitor off antibiotics
- c/w home cogentin
- hypothermic in MICU overnight to 95.5, possible side effect of valproic acid. - blood and urine cultures from 6/9 show no growth to date, RVP negative, CXR stable.  - cont to monitor off antibiotics
Hypothermic in MICU overnight to 95.5, possible side effect of valproic acid. Blood and urine cultures from 6/9 show no growth to date, RVP negative, CXR stable.  - cont to monitor off antibiotics
- hypothermic in MICU to 95.5, possible side effect of valproic acid. - blood and urine cultures from 6/9 show no growth to date, RVP negative, CXR stable.  - cont to monitor off antibiotics
- hypothermic in MICU to 95.5, possible side effect of valproic acid. - blood and urine cultures from 6/9 show no growth to date, RVP negative, CXR stable.  - cont to monitor off antibiotics
- hypothermic in MICU overnight to 95.5, possible side effect of valproic acid. - blood and urine cultures from 6/9 show no growth to date, RVP negative, CXR stable.  - cont to monitor off antibiotics
Hypothermic in MICU overnight to 95.5, possible side effect of valproic acid. Blood and urine cultures from 6/9 show no growth to date.   - cont to monitor off antibiotics
Hypothermic in MICU overnight to 95.5, possible side effect of valproic acid. Blood and urine cultures from 6/9 show no growth to date.   - cont to monitor off antibiotics

## 2018-06-23 NOTE — PROGRESS NOTE ADULT - PROBLEM SELECTOR PROBLEM 5
Abnormal EKG
Need for prophylactic measure
Abnormal EKG
Need for prophylactic measure
Need for prophylactic measure
Abnormal EKG
Abnormal EKG

## 2018-06-23 NOTE — PROGRESS NOTE ADULT - PROBLEM SELECTOR PLAN 3
- c/w home cogentin PO
- c/w home cogentin
- c/w home cogentin PO
- c/w home cogentin
- c/w home cogentin PO
- c/w home cogentin (switched to IV given pt cannot tolerate PO)
- c/w home cogentin (switched to IV given pt failed bedside swallow eval)
- c/w home cogentin
Had episode of bradycardia to 30s on telemetry in MICU with concern for possible 2nd degree AV block type II.  - EP evaluated, determined not heart block, ntd

## 2018-06-23 NOTE — PROGRESS NOTE ADULT - PROBLEM SELECTOR PROBLEM 2
Hypothermia
Cerebral palsy
Hypothermia

## 2018-06-23 NOTE — PROGRESS NOTE ADULT - PROBLEM SELECTOR PLAN 6
Diet: honey thickened pureed diet  DVT ppx: lovenox  IV access: pt pulled out US guided IV today, team will work on getting another one today
Diet: dysphagia 2 honey thickened  DVT ppx: lovenox
Diet: honey thickened pureed diet  DVT ppx: lovenox
Diet: NPO given aspiration risk as per nurse, pending S&S eval  DVT ppx: lovenox

## 2018-06-23 NOTE — PROGRESS NOTE ADULT - PROBLEM SELECTOR PROBLEM 4
Dysphagia
Dysphagia
Abnormal EKG
Dysphagia
Dysphagia
Hypothermia

## 2018-06-23 NOTE — PROGRESS NOTE ADULT - PROBLEM SELECTOR PLAN 1
- pt p/w recurrent seizures in the setting of AED dose reduction  - c/w phenytoin 300mg qhs, keppra 1000mg q12  - no seizure activity >48hrs  - cont to f/u neuro recs

## 2018-06-23 NOTE — PROGRESS NOTE ADULT - PROBLEM SELECTOR PROBLEM 3
Cerebral palsy
Abnormal EKG
Cerebral palsy
Cerebral palsy

## 2018-06-23 NOTE — PROGRESS NOTE ADULT - PROVIDER SPECIALTY LIST ADULT
Cardiology
Electrophysiology
Internal Medicine
MICU
Neurology
Cardiology
Internal Medicine
Internal Medicine
MICU
Neurology
Electrophysiology
Internal Medicine
MICU
MICU
Neurology
Neurology
Internal Medicine

## 2018-07-01 PROBLEM — Z00.00 ENCOUNTER FOR PREVENTIVE HEALTH EXAMINATION: Status: ACTIVE | Noted: 2018-07-01

## 2018-07-18 ENCOUNTER — APPOINTMENT (OUTPATIENT)
Dept: NEUROLOGY | Facility: CLINIC | Age: 53
End: 2018-07-18
Payer: MEDICARE

## 2018-07-18 VITALS — HEIGHT: 64 IN | BODY MASS INDEX: 31.07 KG/M2 | WEIGHT: 182 LBS

## 2018-07-18 PROCEDURE — 99215 OFFICE O/P EST HI 40 MIN: CPT

## 2018-07-19 ENCOUNTER — RX RENEWAL (OUTPATIENT)
Age: 53
End: 2018-07-19

## 2018-07-19 DIAGNOSIS — G40.219 LOCALIZATION-RELATED (FOCAL) (PARTIAL) SYMPTOMATIC EPILEPSY AND EPILEPTIC SYNDROMES WITH COMPLEX PARTIAL SEIZURES, INTRACTABLE, W/OUT STATUS EPILEPTICUS: ICD-10-CM

## 2018-07-19 DIAGNOSIS — G40.109 LOCALIZATION-RELATED (FOCAL) (PARTIAL) SYMPTOMATIC EPILEPSY AND EPILEPTIC SYNDROMES WITH SIMPLE PARTIAL SEIZURES, NOT INTRACTABLE, W/OUT STATUS EPILEPTICUS: ICD-10-CM

## 2018-07-19 RX ORDER — LEVETIRACETAM 100 MG/ML
100 SOLUTION ORAL
Refills: 0 | Status: DISCONTINUED | COMMUNITY
End: 2018-07-19

## 2018-07-20 ENCOUNTER — RX RENEWAL (OUTPATIENT)
Age: 53
End: 2018-07-20

## 2018-07-20 RX ORDER — LEVETIRACETAM 1000 MG/1
1000 TABLET, FILM COATED ORAL TWICE DAILY
Qty: 180 | Refills: 3 | Status: DISCONTINUED | COMMUNITY
Start: 2018-07-19 | End: 2018-07-20

## 2018-07-20 RX ORDER — PHENYTOIN 125 MG/5ML
125 SUSPENSION ORAL
Qty: 1440 | Refills: 3 | Status: ACTIVE | COMMUNITY
Start: 2018-07-20 | End: 1900-01-01

## 2018-07-20 RX ORDER — LEVETIRACETAM 100 MG/ML
100 SOLUTION ORAL TWICE DAILY
Qty: 1800 | Refills: 3 | Status: ACTIVE | COMMUNITY
Start: 2018-07-20 | End: 1900-01-01

## 2018-07-20 RX ORDER — PHENYTOIN SODIUM 100 MG/1
100 CAPSULE ORAL
Qty: 360 | Refills: 3 | Status: DISCONTINUED | COMMUNITY
Start: 2018-07-19 | End: 2018-07-20

## 2018-08-06 ENCOUNTER — OTHER (OUTPATIENT)
Age: 53
End: 2018-08-06

## 2018-08-08 ENCOUNTER — OUTPATIENT (OUTPATIENT)
Dept: OUTPATIENT SERVICES | Facility: HOSPITAL | Age: 53
LOS: 1 days | End: 2018-08-08
Payer: MEDICARE

## 2018-08-08 ENCOUNTER — APPOINTMENT (OUTPATIENT)
Dept: CV DIAGNOSITCS | Facility: HOSPITAL | Age: 53
End: 2018-08-08

## 2018-08-08 DIAGNOSIS — R60.9 EDEMA, UNSPECIFIED: ICD-10-CM

## 2018-08-08 PROCEDURE — 93306 TTE W/DOPPLER COMPLETE: CPT | Mod: 26

## 2018-08-08 PROCEDURE — 93925 LOWER EXTREMITY STUDY: CPT | Mod: 26

## 2018-08-08 PROCEDURE — 93970 EXTREMITY STUDY: CPT | Mod: 26

## 2018-09-05 ENCOUNTER — APPOINTMENT (OUTPATIENT)
Dept: NEUROLOGY | Facility: CLINIC | Age: 53
End: 2018-09-05

## 2018-10-17 ENCOUNTER — INPATIENT (INPATIENT)
Facility: HOSPITAL | Age: 53
LOS: 15 days | Discharge: NURSING, REHAB OR LONG-TERM CARE | End: 2018-11-02
Attending: HOSPITALIST | Admitting: HOSPITALIST
Payer: MEDICARE

## 2018-10-17 VITALS
OXYGEN SATURATION: 100 % | TEMPERATURE: 98 F | SYSTOLIC BLOOD PRESSURE: 142 MMHG | RESPIRATION RATE: 16 BRPM | HEART RATE: 69 BPM | DIASTOLIC BLOOD PRESSURE: 101 MMHG

## 2018-10-17 DIAGNOSIS — R53.2 FUNCTIONAL QUADRIPLEGIA: ICD-10-CM

## 2018-10-17 DIAGNOSIS — G80.9 CEREBRAL PALSY, UNSPECIFIED: ICD-10-CM

## 2018-10-17 DIAGNOSIS — J96.12 CHRONIC RESPIRATORY FAILURE WITH HYPERCAPNIA: ICD-10-CM

## 2018-10-17 DIAGNOSIS — K59.00 CONSTIPATION, UNSPECIFIED: ICD-10-CM

## 2018-10-17 DIAGNOSIS — I44.0 ATRIOVENTRICULAR BLOCK, FIRST DEGREE: ICD-10-CM

## 2018-10-17 DIAGNOSIS — R60.0 LOCALIZED EDEMA: ICD-10-CM

## 2018-10-17 DIAGNOSIS — Z29.9 ENCOUNTER FOR PROPHYLACTIC MEASURES, UNSPECIFIED: ICD-10-CM

## 2018-10-17 DIAGNOSIS — G40.909 EPILEPSY, UNSPECIFIED, NOT INTRACTABLE, WITHOUT STATUS EPILEPTICUS: ICD-10-CM

## 2018-10-17 LAB
ALBUMIN SERPL ELPH-MCNC: 3.5 G/DL — SIGNIFICANT CHANGE UP (ref 3.3–5)
ALP SERPL-CCNC: 121 U/L — HIGH (ref 40–120)
ALT FLD-CCNC: 29 U/L — SIGNIFICANT CHANGE UP (ref 4–33)
APPEARANCE UR: CLEAR — SIGNIFICANT CHANGE UP
AST SERPL-CCNC: 38 U/L — HIGH (ref 4–32)
BASE EXCESS BLDA CALC-SCNC: 9.1 MMOL/L — SIGNIFICANT CHANGE UP
BASE EXCESS BLDV CALC-SCNC: 11.5 MMOL/L — SIGNIFICANT CHANGE UP
BASOPHILS # BLD AUTO: 0.01 K/UL — SIGNIFICANT CHANGE UP (ref 0–0.2)
BASOPHILS NFR BLD AUTO: 0.3 % — SIGNIFICANT CHANGE UP (ref 0–2)
BILIRUB SERPL-MCNC: < 0.2 MG/DL — LOW (ref 0.2–1.2)
BILIRUB UR-MCNC: NEGATIVE — SIGNIFICANT CHANGE UP
BLOOD GAS VENOUS - CREATININE: 0.47 MG/DL — LOW (ref 0.5–1.3)
BLOOD UR QL VISUAL: NEGATIVE — SIGNIFICANT CHANGE UP
BUN SERPL-MCNC: 22 MG/DL — SIGNIFICANT CHANGE UP (ref 7–23)
CALCIUM SERPL-MCNC: 9.4 MG/DL — SIGNIFICANT CHANGE UP (ref 8.4–10.5)
CHLORIDE BLDA-SCNC: 107 MMOL/L — SIGNIFICANT CHANGE UP (ref 96–108)
CHLORIDE BLDV-SCNC: 105 MMOL/L — SIGNIFICANT CHANGE UP (ref 96–108)
CHLORIDE SERPL-SCNC: 101 MMOL/L — SIGNIFICANT CHANGE UP (ref 98–107)
CO2 SERPL-SCNC: 32 MMOL/L — HIGH (ref 22–31)
COLOR SPEC: YELLOW — SIGNIFICANT CHANGE UP
CREAT SERPL-MCNC: 0.59 MG/DL — SIGNIFICANT CHANGE UP (ref 0.5–1.3)
EOSINOPHIL # BLD AUTO: 0.28 K/UL — SIGNIFICANT CHANGE UP (ref 0–0.5)
EOSINOPHIL NFR BLD AUTO: 7 % — HIGH (ref 0–6)
GAS PNL BLDV: 139 MMOL/L — SIGNIFICANT CHANGE UP (ref 136–146)
GLUCOSE BLDA-MCNC: 92 MG/DL — SIGNIFICANT CHANGE UP (ref 70–99)
GLUCOSE BLDV-MCNC: 96 — SIGNIFICANT CHANGE UP (ref 70–99)
GLUCOSE SERPL-MCNC: 98 MG/DL — SIGNIFICANT CHANGE UP (ref 70–99)
GLUCOSE UR-MCNC: NEGATIVE — SIGNIFICANT CHANGE UP
HCO3 BLDA-SCNC: 32 MMOL/L — HIGH (ref 22–26)
HCO3 BLDV-SCNC: 32 MMOL/L — HIGH (ref 20–27)
HCT VFR BLD CALC: 39.7 % — SIGNIFICANT CHANGE UP (ref 34.5–45)
HCT VFR BLDA CALC: 37.7 % — SIGNIFICANT CHANGE UP (ref 34.5–46.5)
HCT VFR BLDV CALC: 37.3 % — SIGNIFICANT CHANGE UP (ref 34.5–45)
HGB BLD-MCNC: 12.4 G/DL — SIGNIFICANT CHANGE UP (ref 11.5–15.5)
HGB BLDA-MCNC: 12.3 G/DL — SIGNIFICANT CHANGE UP (ref 11.5–15.5)
HGB BLDV-MCNC: 12.1 G/DL — SIGNIFICANT CHANGE UP (ref 11.5–15.5)
IMM GRANULOCYTES # BLD AUTO: 0.02 # — SIGNIFICANT CHANGE UP
IMM GRANULOCYTES NFR BLD AUTO: 0.5 % — SIGNIFICANT CHANGE UP (ref 0–1.5)
KETONES UR-MCNC: NEGATIVE — SIGNIFICANT CHANGE UP
LACTATE BLDA-SCNC: 1 MMOL/L — SIGNIFICANT CHANGE UP (ref 0.5–2)
LACTATE BLDV-MCNC: 0.7 MMOL/L — SIGNIFICANT CHANGE UP (ref 0.5–2)
LEUKOCYTE ESTERASE UR-ACNC: NEGATIVE — SIGNIFICANT CHANGE UP
LIDOCAIN IGE QN: 35.2 U/L — SIGNIFICANT CHANGE UP (ref 7–60)
LYMPHOCYTES # BLD AUTO: 1.9 K/UL — SIGNIFICANT CHANGE UP (ref 1–3.3)
LYMPHOCYTES # BLD AUTO: 47.7 % — HIGH (ref 13–44)
MCHC RBC-ENTMCNC: 31.2 % — LOW (ref 32–36)
MCHC RBC-ENTMCNC: 32.4 PG — SIGNIFICANT CHANGE UP (ref 27–34)
MCV RBC AUTO: 103.7 FL — HIGH (ref 80–100)
MONOCYTES # BLD AUTO: 0.43 K/UL — SIGNIFICANT CHANGE UP (ref 0–0.9)
MONOCYTES NFR BLD AUTO: 10.8 % — SIGNIFICANT CHANGE UP (ref 2–14)
NEUTROPHILS # BLD AUTO: 1.34 K/UL — LOW (ref 1.8–7.4)
NEUTROPHILS NFR BLD AUTO: 33.7 % — LOW (ref 43–77)
NITRITE UR-MCNC: NEGATIVE — SIGNIFICANT CHANGE UP
NRBC # FLD: 0 — SIGNIFICANT CHANGE UP
PCO2 BLDA: 63 MMHG — HIGH (ref 32–48)
PCO2 BLDV: 81 MMHG — CRITICAL HIGH (ref 41–51)
PH BLDA: 7.36 PH — SIGNIFICANT CHANGE UP (ref 7.35–7.45)
PH BLDV: 7.3 PH — LOW (ref 7.32–7.43)
PH UR: 6 — SIGNIFICANT CHANGE UP (ref 5–8)
PLATELET # BLD AUTO: 156 K/UL — SIGNIFICANT CHANGE UP (ref 150–400)
PMV BLD: 11 FL — SIGNIFICANT CHANGE UP (ref 7–13)
PO2 BLDA: 105 MMHG — SIGNIFICANT CHANGE UP (ref 83–108)
PO2 BLDV: 30 MMHG — LOW (ref 35–40)
POTASSIUM BLDA-SCNC: 3.7 MMOL/L — SIGNIFICANT CHANGE UP (ref 3.4–4.5)
POTASSIUM BLDV-SCNC: 5.3 MMOL/L — HIGH (ref 3.4–4.5)
POTASSIUM SERPL-MCNC: 4.7 MMOL/L — SIGNIFICANT CHANGE UP (ref 3.5–5.3)
POTASSIUM SERPL-SCNC: 4.7 MMOL/L — SIGNIFICANT CHANGE UP (ref 3.5–5.3)
PROT SERPL-MCNC: 7.4 G/DL — SIGNIFICANT CHANGE UP (ref 6–8.3)
PROT UR-MCNC: NEGATIVE — SIGNIFICANT CHANGE UP
RBC # BLD: 3.83 M/UL — SIGNIFICANT CHANGE UP (ref 3.8–5.2)
RBC # FLD: 14.1 % — SIGNIFICANT CHANGE UP (ref 10.3–14.5)
SAO2 % BLDA: 97.5 % — SIGNIFICANT CHANGE UP (ref 95–99)
SAO2 % BLDV: 44.9 % — LOW (ref 60–85)
SODIUM BLDA-SCNC: 140 MMOL/L — SIGNIFICANT CHANGE UP (ref 136–146)
SODIUM SERPL-SCNC: 143 MMOL/L — SIGNIFICANT CHANGE UP (ref 135–145)
SP GR SPEC: 1.02 — SIGNIFICANT CHANGE UP (ref 1–1.04)
UROBILINOGEN FLD QL: NORMAL — SIGNIFICANT CHANGE UP
WBC # BLD: 3.98 K/UL — SIGNIFICANT CHANGE UP (ref 3.8–10.5)
WBC # FLD AUTO: 3.98 K/UL — SIGNIFICANT CHANGE UP (ref 3.8–10.5)

## 2018-10-17 PROCEDURE — 71045 X-RAY EXAM CHEST 1 VIEW: CPT | Mod: 26

## 2018-10-17 PROCEDURE — 99223 1ST HOSP IP/OBS HIGH 75: CPT | Mod: GC

## 2018-10-17 PROCEDURE — 74177 CT ABD & PELVIS W/CONTRAST: CPT | Mod: 26

## 2018-10-17 PROCEDURE — 99223 1ST HOSP IP/OBS HIGH 75: CPT

## 2018-10-17 PROCEDURE — 74018 RADEX ABDOMEN 1 VIEW: CPT | Mod: 26

## 2018-10-17 RX ORDER — POLYETHYLENE GLYCOL 3350 17 G/17G
17 POWDER, FOR SOLUTION ORAL
Refills: 0 | Status: DISCONTINUED | OUTPATIENT
Start: 2018-10-17 | End: 2018-10-29

## 2018-10-17 RX ORDER — DOCUSATE SODIUM 100 MG
100 CAPSULE ORAL
Refills: 0 | Status: DISCONTINUED | OUTPATIENT
Start: 2018-10-17 | End: 2018-10-19

## 2018-10-17 RX ORDER — FLUTICASONE PROPIONATE 50 MCG
1 SPRAY, SUSPENSION NASAL
Refills: 0 | Status: DISCONTINUED | OUTPATIENT
Start: 2018-10-17 | End: 2018-11-02

## 2018-10-17 RX ORDER — CARBAMAZEPINE 200 MG
100 TABLET ORAL DAILY
Refills: 0 | Status: DISCONTINUED | OUTPATIENT
Start: 2018-10-18 | End: 2018-10-29

## 2018-10-17 RX ORDER — CARBAMAZEPINE 200 MG
400 TABLET ORAL
Refills: 0 | Status: DISCONTINUED | OUTPATIENT
Start: 2018-10-17 | End: 2018-10-29

## 2018-10-17 RX ORDER — LEVETIRACETAM 250 MG/1
1000 TABLET, FILM COATED ORAL
Refills: 0 | Status: DISCONTINUED | OUTPATIENT
Start: 2018-10-17 | End: 2018-10-20

## 2018-10-17 RX ORDER — CARBAMAZEPINE 200 MG
100 TABLET ORAL
Refills: 0 | Status: DISCONTINUED | OUTPATIENT
Start: 2018-10-17 | End: 2018-10-17

## 2018-10-17 RX ORDER — ASPIRIN/CALCIUM CARB/MAGNESIUM 324 MG
81 TABLET ORAL DAILY
Refills: 0 | Status: DISCONTINUED | OUTPATIENT
Start: 2018-10-17 | End: 2018-10-29

## 2018-10-17 RX ORDER — ENOXAPARIN SODIUM 100 MG/ML
40 INJECTION SUBCUTANEOUS EVERY 24 HOURS
Refills: 0 | Status: DISCONTINUED | OUTPATIENT
Start: 2018-10-17 | End: 2018-11-02

## 2018-10-17 RX ORDER — PANTOPRAZOLE SODIUM 20 MG/1
40 TABLET, DELAYED RELEASE ORAL
Refills: 0 | Status: DISCONTINUED | OUTPATIENT
Start: 2018-10-17 | End: 2018-10-29

## 2018-10-17 RX ORDER — BUDESONIDE, MICRONIZED 100 %
0.5 POWDER (GRAM) MISCELLANEOUS DAILY
Refills: 0 | Status: DISCONTINUED | OUTPATIENT
Start: 2018-10-17 | End: 2018-11-02

## 2018-10-17 RX ORDER — DIVALPROEX SODIUM 500 MG/1
500 TABLET, DELAYED RELEASE ORAL
Refills: 0 | Status: DISCONTINUED | OUTPATIENT
Start: 2018-10-17 | End: 2018-10-20

## 2018-10-17 RX ORDER — THIAMINE MONONITRATE (VIT B1) 100 MG
100 TABLET ORAL DAILY
Refills: 0 | Status: DISCONTINUED | OUTPATIENT
Start: 2018-10-17 | End: 2018-10-29

## 2018-10-17 RX ORDER — OLANZAPINE 15 MG/1
7.5 TABLET, FILM COATED ORAL DAILY
Refills: 0 | Status: DISCONTINUED | OUTPATIENT
Start: 2018-10-17 | End: 2018-10-24

## 2018-10-17 RX ORDER — HALOPERIDOL DECANOATE 100 MG/ML
2 INJECTION INTRAMUSCULAR ONCE
Refills: 0 | Status: COMPLETED | OUTPATIENT
Start: 2018-10-17 | End: 2018-10-17

## 2018-10-17 RX ORDER — MAGNESIUM HYDROXIDE 400 MG/1
30 TABLET, CHEWABLE ORAL AT BEDTIME
Refills: 0 | Status: DISCONTINUED | OUTPATIENT
Start: 2018-10-17 | End: 2018-10-29

## 2018-10-17 RX ORDER — SENNA PLUS 8.6 MG/1
2 TABLET ORAL AT BEDTIME
Refills: 0 | Status: DISCONTINUED | OUTPATIENT
Start: 2018-10-17 | End: 2018-10-29

## 2018-10-17 RX ORDER — ALBUTEROL 90 UG/1
2.5 AEROSOL, METERED ORAL ONCE
Refills: 0 | Status: COMPLETED | OUTPATIENT
Start: 2018-10-17 | End: 2018-10-19

## 2018-10-17 RX ORDER — FUROSEMIDE 40 MG
40 TABLET ORAL DAILY
Refills: 0 | Status: DISCONTINUED | OUTPATIENT
Start: 2018-10-17 | End: 2018-10-21

## 2018-10-17 RX ORDER — FOLIC ACID 0.8 MG
1 TABLET ORAL DAILY
Refills: 0 | Status: DISCONTINUED | OUTPATIENT
Start: 2018-10-17 | End: 2018-10-29

## 2018-10-17 RX ORDER — POLYETHYLENE GLYCOL 3350 17 G/17G
17 POWDER, FOR SOLUTION ORAL DAILY
Refills: 0 | Status: DISCONTINUED | OUTPATIENT
Start: 2018-10-17 | End: 2018-10-17

## 2018-10-17 RX ORDER — MINERAL OIL
133 OIL (ML) MISCELLANEOUS ONCE
Refills: 0 | Status: DISCONTINUED | OUTPATIENT
Start: 2018-10-17 | End: 2018-10-17

## 2018-10-17 RX ORDER — FUROSEMIDE 40 MG
40 TABLET ORAL DAILY
Refills: 0 | Status: DISCONTINUED | OUTPATIENT
Start: 2018-10-17 | End: 2018-10-18

## 2018-10-17 RX ORDER — NYSTATIN CREAM 100000 [USP'U]/G
1 CREAM TOPICAL
Refills: 0 | Status: DISCONTINUED | OUTPATIENT
Start: 2018-10-17 | End: 2018-11-02

## 2018-10-17 RX ORDER — PETROLATUM,WHITE
1 JELLY (GRAM) TOPICAL
Refills: 0 | Status: DISCONTINUED | OUTPATIENT
Start: 2018-10-17 | End: 2018-11-02

## 2018-10-17 RX ORDER — HALOPERIDOL DECANOATE 100 MG/ML
3 INJECTION INTRAMUSCULAR ONCE
Refills: 0 | Status: COMPLETED | OUTPATIENT
Start: 2018-10-17 | End: 2018-10-17

## 2018-10-17 RX ORDER — BENZTROPINE MESYLATE 1 MG
0.5 TABLET ORAL
Refills: 0 | Status: DISCONTINUED | OUTPATIENT
Start: 2018-10-17 | End: 2018-10-29

## 2018-10-17 RX ADMIN — HALOPERIDOL DECANOATE 2 MILLIGRAM(S): 100 INJECTION INTRAMUSCULAR at 10:11

## 2018-10-17 RX ADMIN — Medication 2 MILLIGRAM(S): at 10:11

## 2018-10-17 RX ADMIN — HALOPERIDOL DECANOATE 3 MILLIGRAM(S): 100 INJECTION INTRAMUSCULAR at 11:04

## 2018-10-17 NOTE — H&P ADULT - NSHPREVIEWOFSYSTEMS_GEN_ALL_CORE
REVIEW OF SYSTEMS:  Constitutional: [ ] negative [ ] fevers [ ] chills [ ] weight loss [ ] weight gain  HEENT: [ ] negative [ ] dry eyes [ ] eye irritation [ ] postnasal drip [ ] nasal congestion  CV: [ ] negative  [ ] chest pain [ ] orthopnea [ ] palpitations [ ] murmur  Resp: [ ] negative [ ] cough [ ] shortness of breath [ ] dyspnea [ ] wheezing [ ] sputum [ ] hemoptysis  GI: [ ] negative [ ] nausea [ ] vomiting [ ] diarrhea [ ] constipation [ ] abd pain [ ] dysphagia   : [ ] negative [ ] dysuria [ ] nocturia [ ] hematuria [ ] increased urinary frequency  Musculoskeletal: [ ] negative [ ] back pain [ ] myalgias [ ] arthralgias [ ] fracture  Skin: [ ] negative [ ] rash [ ] itch  Neurological: [ ] negative [ ] headache [ ] dizziness [ ] syncope [ ] weakness [ ] numbness  Psychiatric: [ ] negative [ ] anxiety [ ] depression  Endocrine: [ ] negative [ ] diabetes [ ] thyroid problem  Hematologic/Lymphatic: [ ] negative [ ] anemia [ ] bleeding problem  Allergic/Immunologic: [ ] negative [ ] itchy eyes [ ] nasal discharge [ ] hives [ ] angioedema  [ ] All other systems negative  [ X] Unable to assess ROS because ___Baseline MS_____ REVIEW OF SYSTEMS:  Constitutional: [ ] negative [ ] fevers [ ] chills [ ] weight loss [ ] weight gain  HEENT: [ ] negative [ ] dry eyes [ ] eye irritation [ ] postnasal drip [ ] nasal congestion  CV: [ ] negative  [ ] chest pain [ ] orthopnea [ ] palpitations [ ] murmur  Resp: [ ] negative [ ] cough [ ] shortness of breath [ ] dyspnea [ ] wheezing [ ] sputum [ ] hemoptysis  GI: [ ] negative [ ] nausea [ ] vomiting [ ] diarrhea [ ] constipation [ ] abd pain [ ] dysphagia   : [ ] negative [ ] dysuria [ ] nocturia [ ] hematuria [ ] increased urinary frequency  Musculoskeletal: [ ] negative [ ] back pain [ ] myalgias [ ] arthralgias [ ] fracture  Skin: [ ] negative [ ] rash [ ] itch  Neurological: [ ] negative [ ] headache [ ] dizziness [ ] syncope [ ] weakness [ ] numbness  Psychiatric: [ ] negative [ ] anxiety [ ] depression  Endocrine: [ ] negative [ ] diabetes [ ] thyroid problem  Hematologic/Lymphatic: [ ] negative [ ] anemia [ ] bleeding problem  Allergic/Immunologic: [ ] negative [ ] itchy eyes [ ] nasal discharge [ ] hives [ ] angioedema  [ ] All other systems negative  [ X] Unable to assess ROS because ___Baseline mental status (CP, nonverbal)_____

## 2018-10-17 NOTE — ED ADULT NURSE NOTE - OBJECTIVE STATEMENT
Patient received from [facility] with severe MR (responds to name) for 1 day of wheezing, pedal edema x1 day (pt. with h/o of intermittent periods of edema - pt. spends most the day in a wheelchair) and 3 days of constipation - staff at bedside states that pt. typically has BM every day. VSS on RA. No non-verbal s/sx of chest pain, dizziness, abdominal pain at this time. 20g PIV to right hand, labs drawn, medications administered per orders, NAD noted - will continue to monitor

## 2018-10-17 NOTE — ED PROVIDER NOTE - OBJECTIVE STATEMENT
52 y/o F hx severe MR, cerebral palsy, bipolar, seizures, non verbal at baseline brought in from group home for 1) wheezing x 1 day, 2) constipation x 3 days, 3) pedal edema x 1 day. Aid at bedside states pt acting per her usual behavior. Per med rec from group home, pt is due to get an enema after 2 days of not having a bowel movement, however aid states she did not get one. Sent in by the nurse manager at the group home for further evaluation.

## 2018-10-17 NOTE — H&P ADULT - NSHPSOCIALHISTORY_GEN_ALL_CORE
Lives in nursing home. Family History: No known pertinent family history for pt's current condition.     Tobacco: never  EtOH: never  Illicit drugs: never  Lives in nursing home.

## 2018-10-17 NOTE — H&P ADULT - ASSESSMENT
The patient is a 53 year old female with CP (a/w severe ID, baseline non verbal, does not ambulate, however can feed herself, follows some commands), intractable seizures who presents with wheezing, constipation and edema of LE per aid at bedside. Patient with severe constipation and now sedated due to agitation.

## 2018-10-17 NOTE — ED PROVIDER NOTE - PROGRESS NOTE DETAILS
NEFTALY Porter: Pt seen by pulm, recommend continuous pulse ox. Pt accepted to the hospitalist service, enema ordered, pt put on tele for continuous pulse ox.

## 2018-10-17 NOTE — ED PROVIDER NOTE - MEDICAL DECISION MAKING DETAILS
54 y/o F hx MR here w/ wheezing, constipation and pedal edema. No wheezing on lung exam, appears to be related to upper airway and is intermittent, pedal edema dependent, oxygenation 98% on room air, abdomen firm pt not complying with rectal exam, plan cxr/abdominal xray, ct abdomen, reassess

## 2018-10-17 NOTE — H&P ADULT - NSHPPHYSICALEXAM_GEN_ALL_CORE
Vital Signs Last 24 Hrs  T(C): 36.4 (17 Oct 2018 09:02), Max: 36.4 (17 Oct 2018 09:02)  T(F): 97.6 (17 Oct 2018 09:02), Max: 97.6 (17 Oct 2018 09:02)  HR: 61 (17 Oct 2018 16:28) (56 - 69)  BP: 124/68 (17 Oct 2018 18:51) (121/51 - 142/101)  BP(mean): --  RR: 16 (17 Oct 2018 18:51) (12 - 16)  SpO2: 98% (17 Oct 2018 18:51) (98% - 100%)  CAPILLARY BLOOD GLUCOSE        I&O's Summary      PHYSICAL EXAM:  GENERAL: NAD, asleep  HEAD:  poor dentition, Atraumatic, Normocephalic  EYES: not following commands conjunctiva and sclera clear  NECK: Supple, No JVD  CHEST/LUNG: Clear to auscultation bilaterally; No wheeze  HEART: bradycardic reg rhythm; No murmurs, rubs, or gallops  ABDOMEN: Distended abdomen, tense, hypoactive BS. No TTP  EXTREMITIES:  2+ Peripheral Pulses, 2+ pitting edema LE  PSYCH: AAOx0  NEUROLOGY: not follwing commands, contracted upper and lower extremities  SKIN: erythema of sacrum Vital Signs Last 24 Hrs  T(C): 36.4 (17 Oct 2018 09:02), Max: 36.4 (17 Oct 2018 09:02)  T(F): 97.6 (17 Oct 2018 09:02), Max: 97.6 (17 Oct 2018 09:02)  HR: 61 (17 Oct 2018 16:28) (56 - 69)  BP: 124/68 (17 Oct 2018 18:51) (121/51 - 142/101)  BP(mean): --  RR: 16 (17 Oct 2018 18:51) (12 - 16)  SpO2: 98% (17 Oct 2018 18:51) (98% - 100%)  CAPILLARY BLOOD GLUCOSE        I&O's Summary      PHYSICAL EXAM:  GENERAL: NAD, asleep but easily arousable  HEAD:  Atraumatic, Normocephalic  EYES: Not following commands for EOM, conjunctiva and sclera clear  MOUTH: Poor dentition, no oropharyngeal exudates  NECK: Supple, No JVD  CHEST/LUNG: Poor inspiratory effort; Clear to auscultation bilaterally; No wheeze  HEART: Bradycardic, Regular rhythm; No murmurs, rubs, or gallops; 2+ pitting edema LE  ABDOMEN: Distended abdomen, tense, hypoactive BS. No TTP.   EXTREMITIES:  2+ Peripheral Pulses, No cyanosis in peripheral extremities b/l  PSYCH: Agitated when attempting to place pulse ox probe on finger, unable to fully assess due to poor mental status  NEUROLOGY: AAOx0, not follwing commands, contracted upper and lower extremities  SKIN: Erythema of sacrum

## 2018-10-17 NOTE — ED ADULT NURSE NOTE - NSIMPLEMENTINTERV_GEN_ALL_ED
Implemented All Fall with Harm Risk Interventions:  [place] to call system. Call bell, personal items and telephone within reach. Instruct patient to call for assistance. Room bathroom lighting operational. Non-slip footwear when patient is off stretcher. Physically safe environment: no spills, clutter or unnecessary equipment. Stretcher in lowest position, wheels locked, appropriate side rails in place. Provide visual cue, wrist band, yellow gown, etc. Monitor gait and stability. Monitor for mental status changes and reorient to person, place, and time. Review medications for side effects contributing to fall risk. Reinforce activity limits and safety measures with patient and family. Provide visual clues: red socks.

## 2018-10-17 NOTE — CONSULT NOTE ADULT - SUBJECTIVE AND OBJECTIVE BOX
CHIEF COMPLAINT: intermittent wheezing    HPI:  The patient is a 53 year old female with CP (a/w severe ID, baseline non verbal, does not ambulate, however can feed herself, follows some commands), intractable seizures (recently admitted  to [facility] MICU with multiple seizures) who presents with altered mental status. Patient found to have acute hypercapnic respiratory failure on VBG. The patient was sedated in the ED in order to have CT imaging performed. Seen and examined at the bedside afterwards. The patient is non verbal at bedside and collateral was obtained from the [facility] at bedside. The patient was wearing a nasal cannula, however it was not plugged in. I retrieved a vitals machine and placed the pulse ox on the patients finger which showed a reading of 96% on RA. The patients oxygen dropped to the low 90s intermittently during the exam. The patient appeared sedated. No audible wheezing was heard during the examination. Per the A, the patient does not have an lung disease at baseline however chart review shows chronically elevated PCO2.     PAST MEDICAL & SURGICAL HISTORY:  Intellectual disability  Constipation  Seizure disorder  Cerebral palsy  No significant past surgical history      FAMILY HISTORY:  No pertinent family history in first degree relatives      SOCIAL HISTORY:  Smoking: [x ] Never Smoked [ ] Former Smoker (__ packs x ___ years) [ ] Current Smoker  (__ packs x ___ years)  Substance Use: [ ] Never Used [ ] Used ____  EtOH Use:  Marital Status: [ ] Single [ ]  [ ]  [ ]   Sexual History:   Occupation:  Recent Travel:  Country of Birth:  Advance Directives:    Allergies    Topamax (Unknown)    Intolerances        HOME MEDICATIONS:    REVIEW OF SYSTEMS:  Constitutional: [ ] negative [ ] fevers [ ] chills [ ] weight loss [ ] weight gain  HEENT: [ ] negative [ ] dry eyes [ ] eye irritation [ ] postnasal drip [ ] nasal congestion  CV: [ ] negative  [ ] chest pain [ ] orthopnea [ ] palpitations [ ] murmur  Resp: [ ] negative [ ] cough [ ] shortness of breath [ ] dyspnea [ ] wheezing [ ] sputum [ ] hemoptysis  GI: [ ] negative [ ] nausea [ ] vomiting [ ] diarrhea [ ] constipation [ ] abd pain [ ] dysphagia   : [ ] negative [ ] dysuria [ ] nocturia [ ] hematuria [ ] increased urinary frequency  Musculoskeletal: [ ] negative [ ] back pain [ ] myalgias [ ] arthralgias [ ] fracture  Skin: [ ] negative [ ] rash [ ] itch  Neurological: [ ] negative [ ] headache [ ] dizziness [ ] syncope [ ] weakness [ ] numbness  Psychiatric: [ ] negative [ ] anxiety [ ] depression  Endocrine: [ ] negative [ ] diabetes [ ] thyroid problem  Hematologic/Lymphatic: [ ] negative [ ] anemia [ ] bleeding problem  Allergic/Immunologic: [ ] negative [ ] itchy eyes [ ] nasal discharge [ ] hives [ ] angioedema  [ ] All other systems negative  [ x] Unable to assess ROS because underlying dementia    OBJECTIVE:  ICU Vital Signs Last 24 Hrs  T(C): 36.4 (17 Oct 2018 09:02), Max: 36.4 (17 Oct 2018 09:02)  T(F): 97.6 (17 Oct 2018 09:02), Max: 97.6 (17 Oct 2018 09:02)  HR: 56 (17 Oct 2018 14:25) (56 - 69)  BP: 121/51 (17 Oct 2018 14:25) (121/51 - 142/101)  BP(mean): --  ABP: --  ABP(mean): --  RR: 12 (17 Oct 2018 14:25) (12 - 16)  SpO2: 100% (17 Oct 2018 14:25) (100% - 100%)        CAPILLARY BLOOD GLUCOSE    PHYSICAL EXAM:   GEN: Age appropriate, resting comfortably in bed, no acute distress, non toxic appearing, non verbal  HEENT: Conjunctiva and sclera normal  PULM: Lungs CTAB, no wheezes  CV: RRR, S1S2, no MRG  Abdominal:+BS  Extremities: No edema or cyanosis  NEURO: AAOx3  Skin: No rashes, lesions    HOSPITAL MEDICATIONS:    LABS:                        12.4   3.98  )-----------( 156      ( 17 Oct 2018 11:40 )             39.7     Hgb Trend: 12.4<--  10-    143  |  101  |  22  ----------------------------<  98  4.7   |  32<H>  |  0.59    Ca    9.4      17 Oct 2018 11:40    TPro  7.4  /  Alb  3.5  /  TBili  < 0.2<L>  /  DBili  x   /  AST  38<H>  /  ALT  29  /  AlkPhos  121<H>  10-17    Creatinine Trend: 0.59<--    Urinalysis Basic - ( 17 Oct 2018 11:54 )    Color: YELLOW / Appearance: CLEAR / S.020 / pH: 6.0  Gluc: NEGATIVE / Ketone: NEGATIVE  / Bili: NEGATIVE / Urobili: NORMAL   Blood: NEGATIVE / Protein: NEGATIVE / Nitrite: NEGATIVE   Leuk Esterase: NEGATIVE / RBC: x / WBC x   Sq Epi: x / Non Sq Epi: x / Bacteria: x      Arterial Blood Gas:  10-17 @ 12:30  7.36/63/105/32/97.5/9.1  ABG lactate: 1.0    Venous Blood Gas:  10-17 @ 11:40  7.30/81/30/32/44.9  VBG Lactate: 0.7        RADIOLOGY:  [x ] Reviewed and interpreted by me. Xray did not show any evidence of pleural effusions or consolidations. CHIEF COMPLAINT: intermittent wheezing    HPI:  The patient is a 53 year old female with CP (a/w severe ID, baseline non verbal, does not ambulate, however can feed herself, follows some commands), intractable seizures ([facility]) who presents with altered mental status. Patient found to have hypercapnic respiratory failure on VBG. The patient was sedated in the ED in order to have CT imaging performed. The patient demonstrated an appropriate response to sedation and was protecting her airway when seen and examined at the bedside afterwards. The patient is non verbal at bedside and collateral was obtained from the [facility] at bedside. The patient was wearing a nasal cannula, however it was not plugged in. I retrieved a vitals machine and placed the pulse ox on the patients finger which showed a reading of 96% on RA. The patients oxygen dropped to the low 90s intermittently during the exam. The patient appeared sedated. No audible wheezing was heard during the examination. Per the A, the patient does not have an lung disease at baseline however chart review shows chronically elevated PCO2.     PAST MEDICAL & SURGICAL HISTORY:  Intellectual disability  Constipation  Seizure disorder  Cerebral palsy  No significant past surgical history      FAMILY HISTORY:  No pertinent family history in first degree relatives      SOCIAL HISTORY:  Smoking: [x ] Never Smoked [ ] Former Smoker (__ packs x ___ years) [ ] Current Smoker  (__ packs x ___ years)  Substance Use: [ ] Never Used [ ] Used ____  EtOH Use:  Marital Status: [ ] Single [ ]  [ ]  [ ]   Sexual History:   Occupation:  Recent Travel:  Country of Birth:  Advance Directives:    Allergies    Topamax (Unknown)    Intolerances        HOME MEDICATIONS:    REVIEW OF SYSTEMS:  Constitutional: [ ] negative [ ] fevers [ ] chills [ ] weight loss [ ] weight gain  HEENT: [ ] negative [ ] dry eyes [ ] eye irritation [ ] postnasal drip [ ] nasal congestion  CV: [ ] negative  [ ] chest pain [ ] orthopnea [ ] palpitations [ ] murmur  Resp: [ ] negative [ ] cough [ ] shortness of breath [ ] dyspnea [ ] wheezing [ ] sputum [ ] hemoptysis  GI: [ ] negative [ ] nausea [ ] vomiting [ ] diarrhea [ ] constipation [ ] abd pain [ ] dysphagia   : [ ] negative [ ] dysuria [ ] nocturia [ ] hematuria [ ] increased urinary frequency  Musculoskeletal: [ ] negative [ ] back pain [ ] myalgias [ ] arthralgias [ ] fracture  Skin: [ ] negative [ ] rash [ ] itch  Neurological: [ ] negative [ ] headache [ ] dizziness [ ] syncope [ ] weakness [ ] numbness  Psychiatric: [ ] negative [ ] anxiety [ ] depression  Endocrine: [ ] negative [ ] diabetes [ ] thyroid problem  Hematologic/Lymphatic: [ ] negative [ ] anemia [ ] bleeding problem  Allergic/Immunologic: [ ] negative [ ] itchy eyes [ ] nasal discharge [ ] hives [ ] angioedema  [ ] All other systems negative  [ x] Unable to assess ROS because underlying dementia    OBJECTIVE:  ICU Vital Signs Last 24 Hrs  T(C): 36.4 (17 Oct 2018 09:02), Max: 36.4 (17 Oct 2018 09:02)  T(F): 97.6 (17 Oct 2018 09:02), Max: 97.6 (17 Oct 2018 09:02)  HR: 56 (17 Oct 2018 14:25) (56 - 69)  BP: 121/51 (17 Oct 2018 14:25) (121/51 - 142/101)  BP(mean): --  ABP: --  ABP(mean): --  RR: 12 (17 Oct 2018 14:25) (12 - 16)  SpO2: 100% (17 Oct 2018 14:25) (100% - 100%)        CAPILLARY BLOOD GLUCOSE    PHYSICAL EXAM:   GEN: Age appropriate, resting comfortably in bed, no acute distress, non toxic appearing, non verbal  HEENT: Conjunctiva and sclera normal  PULM: Lungs CTAB, no wheezes  CV: RRR, S1S2, no MRG  Abdominal:+BS  Extremities: No edema or cyanosis  NEURO: AAOx3  Skin: No rashes, lesions    HOSPITAL MEDICATIONS:    LABS:                        12.4   3.98  )-----------( 156      ( 17 Oct 2018 11:40 )             39.7     Hgb Trend: 12.4<--  10-    143  |  101  |  22  ----------------------------<  98  4.7   |  32<H>  |  0.59    Ca    9.4      17 Oct 2018 11:40    TPro  7.4  /  Alb  3.5  /  TBili  < 0.2<L>  /  DBili  x   /  AST  38<H>  /  ALT  29  /  AlkPhos  121<H>  10-17    Creatinine Trend: 0.59<--    Urinalysis Basic - ( 17 Oct 2018 11:54 )    Color: YELLOW / Appearance: CLEAR / S.020 / pH: 6.0  Gluc: NEGATIVE / Ketone: NEGATIVE  / Bili: NEGATIVE / Urobili: NORMAL   Blood: NEGATIVE / Protein: NEGATIVE / Nitrite: NEGATIVE   Leuk Esterase: NEGATIVE / RBC: x / WBC x   Sq Epi: x / Non Sq Epi: x / Bacteria: x      Arterial Blood Gas:  10-17 @ 12:30  7.36/63/105/32/97.5/9.1  ABG lactate: 1.0    Venous Blood Gas:  10-17 @ 11:40  7.30/81/30/32/44.9  VBG Lactate: 0.7        RADIOLOGY:  [x ] Reviewed and interpreted by me. Xray did not show any evidence of pleural effusions or consolidations.

## 2018-10-17 NOTE — H&P ADULT - PROBLEM SELECTOR PLAN 1
Patient with tense abdomen without TTP, CT negative for obstruction, hx 3 day w/o BM, unclear of patient's baseline. On many laxative.   -Will trial mineral oil enema, continue home laxative  -if unsuccessful may need manual disimpaction. Patient with tense abdomen without TTP, CT negative for obstruction, hx 3 day w/o BM, unclear of patient's baseline. On many laxatives. Likely exacerbated by chronic benztropine use.   -Will trial oral laxatives with Miralax and Senna. Also start stool softeners with Colace.  -If unsuccessful may need manual disimpaction.

## 2018-10-17 NOTE — H&P ADULT - PROBLEM SELECTOR PLAN 5
Hx of LE edema w/ LE US wnl 07/2018 on furosemide  -continue on furosemide  -has risks for DVT but likely in the setting of poassive congestion Hx of LE edema w/ LE US wnl 07/2018 on furosemide  -continue on furosemide  -has risks for DVT but likely in the setting of passive congestion

## 2018-10-17 NOTE — CONSULT NOTE ADULT - ASSESSMENT
The patient is a 53 year old female with CP (a/w severe ID, baseline non verbal, does not ambulate, however can feed herself, follows some commands), intractable seizures (recently [facility] with multiple seizures) who was found to have hypercapnic respiratory failure on VBG.     Problem: hypercapnic resp failure  Assessment: the hypercapnic respiratory failure appears chronic based on chart review. In addition, the patient was noted to have oxygen drops while sedated/sleeping which could indicate TATYANA. The patients O2 sat was 96% on RA and dropped as low as 90%.   Plan: Patient should monitored on a floor with continuous pulse oximetry until the patient wakes up from sedation. The patient should get a sleep study as an outpatient. Aside from that, no acute pulmonary intervention needed.

## 2018-10-17 NOTE — ED PROVIDER NOTE - ATTENDING CONTRIBUTION TO CARE
52 yo female, MR, CP, hx of constipation and seizure disorder, presents with aide who has background withpatient who is non verbal, note says patient wheezing and no BM in 3 days. Orders are for enema after 2 days BM, this was not done. also swelling of feet which according to aide waxes and wanes. She is non ambulatory and wheel chair bound meds reviewed Exam 142/101 P 80 afebrile sat 98 percent room air, no stridor intermittent airway sound eminating from glottis, no distress of airway obstruction, chest clear, abd, non tender, hard and protruberant, + BS Cor RR abd soft non tender ext edema on dorsum of feet, most prominent, symetric, trace edema of ankles, LE conractures. Imp: Oglivie syndrome, no evidenc3 of respiratory distress, airway obstruction. Pt agitated, needs CT abd, Haldol and Ativan sedation CXR, u/a CMP, cbc 52 yo female, MR, CP, hx of constipation and seizure disorder, presents with aide who has background withpatient who is non verbal, note says patient wheezing and no BM in 3 days. Orders are for enema after 2 days BM, this was not done. also swelling of feet which according to aide waxes and wanes. She is non ambulatory and wheel chair bound meds reviewed Exam 142/101 P 80 afebrile sat 98 percent room air, no stridor intermittent airway sound eminating from glottis, no distress of airway obstruction, chest clear, abd, non tender, hard and protruberant, + BS Cor RR abd soft non tender ext edema on dorsum of feet, most prominent, symetric, trace edema of ankles, LE conractures. Imp: Oglivie syndrome, no evidence of respiratory distress, airway obstruction. Pt agitated, needs CT abd, Haldol and Ativan sedation CXR, u/a CMP, cbc  Patient admitted for obstipation.

## 2018-10-17 NOTE — CONSULT NOTE ADULT - ATTENDING COMMENTS
53 year old female with cerebral palsy, from group home, wheelchair bound, nonverbal, needs assistance with ADLs except self-feeds, presents to ED with wheeze as well as distended abd/constipation. Required sedation for CT abd, noted to have hypercapnia of VBG (pre-sedation). ABG done to confirm which shows chronic hypercapnic respiratory failure, serum bicarbonate of 32. Pulmonary consulted as patient was heavily sedated and was concerned about admission to floors. Patient is sedated but arousable, nasal cannula in nares but not connected to oxygen source saturating 96% on RA. ABG as stated above shows chronic hypercapnia, and no sign of worsening pCO2 as compared to baseline despite sedation. Patient should be admitted to a floor with continuous pulse oximetry until sedation wears off. No need for acute pulmonary intervention at this time. No wheezing heard on exam. Also, patient with likely TATYANA and will need eventual outpatient sleep study.

## 2018-10-17 NOTE — H&P ADULT - PROBLEM SELECTOR PLAN 4
Patient with bradycardia w/ hx of 1st degree on EKG from 5/2018 potentially in setting of sedation.  -continue to monitor on tele  -avoid AV blocking meds Patient with bradycardia w/ hx of 1st degree on EKG from 5/2018 potentially in setting of sedation.  -continue to monitor on tele  -avoid AV blocking meds  -lipid panel for risk factors, may need statin

## 2018-10-17 NOTE — H&P ADULT - PROBLEM SELECTOR PLAN 3
Patient on several antiepileptics due to recent MICU for status epilecticus. No signs of seizure activity  -continue home divalproex, phenytoin, levetiracetam, carabamazepine. Patient on several antiepileptics due to recent MICU for status epilecticus. No signs of seizure activity  -continue home divalproex, phenytoin, levetiracetam, carabamazepine  -seizure precautions

## 2018-10-17 NOTE — H&P ADULT - PROBLEM SELECTOR PLAN 2
Patient with chronic hypercapnia likely in setting of atelectasis and potentially TATYANA. Currently satting well on RA/O2.  -continue to monitor, continue home inhalers albuterol and budesonide  -f/u OP for sleep study.

## 2018-10-17 NOTE — ED ADULT TRIAGE NOTE - CHIEF COMPLAINT QUOTE
Pt hx of profound MR, Spastic Encephalopathy, Cerebral Palsy, Hx of Seizure, brought in from home with HHA for constipation x3 days, swelling to b/l feet, and audible wheezing, unable to obtain an adequate O2 sat.

## 2018-10-17 NOTE — H&P ADULT - NSHPLABSRESULTS_GEN_ALL_CORE
LABS:                        12.4   3.98  )-----------( 156      ( 17 Oct 2018 11:40 )             39.7     10-17    143  |  101  |  22  ----------------------------<  98  4.7   |  32<H>  |  0.59    Ca    9.4      17 Oct 2018 11:40    TPro  7.4  /  Alb  3.5  /  TBili  < 0.2<L>  /  DBili  x   /  AST  38<H>  /  ALT  29  /  AlkPhos  121<H>  10-17          Urinalysis Basic - ( 17 Oct 2018 11:54 )    Color: YELLOW / Appearance: CLEAR / S.020 / pH: 6.0  Gluc: NEGATIVE / Ketone: NEGATIVE  / Bili: NEGATIVE / Urobili: NORMAL   Blood: NEGATIVE / Protein: NEGATIVE / Nitrite: NEGATIVE   Leuk Esterase: NEGATIVE / RBC: x / WBC x   Sq Epi: x / Non Sq Epi: x / Bacteria: x    < from: CT Abdomen and Pelvis w/ IV Cont (10.17.18 @ 13:32) >    LOWER CHEST: Scattered tree-in-bud opacities in the right lung with left   lower lobe atelectasis.    LIVER: Within normal limits.  BILE DUCTS: Normal caliber.  GALLBLADDER: Within normal limits.  SPLEEN: Within normal limits.  PANCREAS: Within normal limits.  ADRENALS: Within normal limits.  KIDNEYS/URETERS: No hydronephrosis.    BLADDER: Within normal limits.  REPRODUCTIVE ORGANS: Fibroid uterus. Unremarkable CT appearance of the   adnexa.    BOWEL: High density within the stomach likely related to ingested   material. No bowel obstruction. Normal appendix.  PERITONEUM: No ascites.  VESSELS:  Normal caliber abdominal aorta.  RETROPERITONEUM: No lymphadenopathy.    ABDOMINAL WALL: Mild subcutaneous edema.  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   Degenerative changesof the spine.    IMPRESSION:     No bowel obstruction. Normal appendix.    Fibroid uterus.        < end of copied text >

## 2018-10-17 NOTE — H&P ADULT - HISTORY OF PRESENT ILLNESS
The patient is a 53 year old female with CP (a/w severe ID, baseline non verbal, does not ambulate, however can feed herself, follows some commands), intractable seizures who presents with wheezing, constipation and edema of LE per aid at bedside. Patient nonverbal at baseline and asleep. In ED was agitated and required sedation for lab/CT scan given lorazepam 2mg, haloperidol 2mg and 3mg.   Patient found to have hypercapnic respiratory failure on VBG after sedation and pulm was called for hypercapnia possible causing AMS, however on review pt has hx of hypercapnia on VBGs, with ABG showing chronic hypercapnic.   Patient seen asleep waking to stimuli.   In ED: Vitals 97.6; 69; 142/101; 16 100% on RA. Received lorazepam 2mg, haloperidol 2mg and 3mg.

## 2018-10-18 LAB
ALBUMIN SERPL ELPH-MCNC: 3.4 G/DL — SIGNIFICANT CHANGE UP (ref 3.3–5)
ALP SERPL-CCNC: 124 U/L — HIGH (ref 40–120)
ALT FLD-CCNC: 27 U/L — SIGNIFICANT CHANGE UP (ref 4–33)
AST SERPL-CCNC: 22 U/L — SIGNIFICANT CHANGE UP (ref 4–32)
BACTERIA UR CULT: SIGNIFICANT CHANGE UP
BASOPHILS # BLD AUTO: 0.01 K/UL — SIGNIFICANT CHANGE UP (ref 0–0.2)
BASOPHILS NFR BLD AUTO: 0.3 % — SIGNIFICANT CHANGE UP (ref 0–2)
BILIRUB SERPL-MCNC: < 0.2 MG/DL — LOW (ref 0.2–1.2)
BUN SERPL-MCNC: 19 MG/DL — SIGNIFICANT CHANGE UP (ref 7–23)
CALCIUM SERPL-MCNC: 9.4 MG/DL — SIGNIFICANT CHANGE UP (ref 8.4–10.5)
CHLORIDE SERPL-SCNC: 102 MMOL/L — SIGNIFICANT CHANGE UP (ref 98–107)
CHOLEST SERPL-MCNC: 212 MG/DL — HIGH (ref 120–199)
CO2 SERPL-SCNC: 32 MMOL/L — HIGH (ref 22–31)
CREAT SERPL-MCNC: 0.48 MG/DL — LOW (ref 0.5–1.3)
EOSINOPHIL # BLD AUTO: 0.2 K/UL — SIGNIFICANT CHANGE UP (ref 0–0.5)
EOSINOPHIL NFR BLD AUTO: 7 % — HIGH (ref 0–6)
GLUCOSE SERPL-MCNC: 86 MG/DL — SIGNIFICANT CHANGE UP (ref 70–99)
HCT VFR BLD CALC: 39.4 % — SIGNIFICANT CHANGE UP (ref 34.5–45)
HDLC SERPL-MCNC: 67 MG/DL — HIGH (ref 45–65)
HGB BLD-MCNC: 12.5 G/DL — SIGNIFICANT CHANGE UP (ref 11.5–15.5)
IMM GRANULOCYTES # BLD AUTO: 0.02 # — SIGNIFICANT CHANGE UP
IMM GRANULOCYTES NFR BLD AUTO: 0.7 % — SIGNIFICANT CHANGE UP (ref 0–1.5)
LIPID PNL WITH DIRECT LDL SERPL: 144 MG/DL — SIGNIFICANT CHANGE UP
LYMPHOCYTES # BLD AUTO: 1.46 K/UL — SIGNIFICANT CHANGE UP (ref 1–3.3)
LYMPHOCYTES # BLD AUTO: 50.9 % — HIGH (ref 13–44)
MCHC RBC-ENTMCNC: 31.7 % — LOW (ref 32–36)
MCHC RBC-ENTMCNC: 32.3 PG — SIGNIFICANT CHANGE UP (ref 27–34)
MCV RBC AUTO: 101.8 FL — HIGH (ref 80–100)
MONOCYTES # BLD AUTO: 0.27 K/UL — SIGNIFICANT CHANGE UP (ref 0–0.9)
MONOCYTES NFR BLD AUTO: 9.4 % — SIGNIFICANT CHANGE UP (ref 2–14)
NEUTROPHILS # BLD AUTO: 0.91 K/UL — LOW (ref 1.8–7.4)
NEUTROPHILS NFR BLD AUTO: 31.7 % — LOW (ref 43–77)
NRBC # FLD: 0.02 — SIGNIFICANT CHANGE UP
PLATELET # BLD AUTO: 141 K/UL — LOW (ref 150–400)
PMV BLD: 10.4 FL — SIGNIFICANT CHANGE UP (ref 7–13)
POTASSIUM SERPL-MCNC: 4 MMOL/L — SIGNIFICANT CHANGE UP (ref 3.5–5.3)
POTASSIUM SERPL-SCNC: 4 MMOL/L — SIGNIFICANT CHANGE UP (ref 3.5–5.3)
PROT SERPL-MCNC: 6.9 G/DL — SIGNIFICANT CHANGE UP (ref 6–8.3)
RBC # BLD: 3.87 M/UL — SIGNIFICANT CHANGE UP (ref 3.8–5.2)
RBC # FLD: 14.3 % — SIGNIFICANT CHANGE UP (ref 10.3–14.5)
SODIUM SERPL-SCNC: 145 MMOL/L — SIGNIFICANT CHANGE UP (ref 135–145)
SPECIMEN SOURCE: SIGNIFICANT CHANGE UP
TRIGL SERPL-MCNC: 96 MG/DL — SIGNIFICANT CHANGE UP (ref 10–149)
WBC # BLD: 2.87 K/UL — LOW (ref 3.8–10.5)
WBC # FLD AUTO: 2.87 K/UL — LOW (ref 3.8–10.5)

## 2018-10-18 PROCEDURE — 99232 SBSQ HOSP IP/OBS MODERATE 35: CPT | Mod: GC

## 2018-10-18 PROCEDURE — 99232 SBSQ HOSP IP/OBS MODERATE 35: CPT

## 2018-10-18 RX ORDER — GLYCERIN ADULT
1 SUPPOSITORY, RECTAL RECTAL ONCE
Refills: 0 | Status: COMPLETED | OUTPATIENT
Start: 2018-10-18 | End: 2018-10-18

## 2018-10-18 RX ORDER — IPRATROPIUM/ALBUTEROL SULFATE 18-103MCG
3 AEROSOL WITH ADAPTER (GRAM) INHALATION ONCE
Refills: 0 | Status: COMPLETED | OUTPATIENT
Start: 2018-10-18 | End: 2018-10-18

## 2018-10-18 RX ADMIN — Medication 0.5 MILLIGRAM(S): at 07:39

## 2018-10-18 RX ADMIN — Medication 3 MILLILITER(S): at 04:46

## 2018-10-18 RX ADMIN — Medication 100 MILLIGRAM(S): at 12:03

## 2018-10-18 RX ADMIN — Medication 0.5 MILLIGRAM(S): at 07:03

## 2018-10-18 RX ADMIN — Medication 100 MILLIGRAM(S): at 07:04

## 2018-10-18 RX ADMIN — ENOXAPARIN SODIUM 40 MILLIGRAM(S): 100 INJECTION SUBCUTANEOUS at 19:13

## 2018-10-18 RX ADMIN — LEVETIRACETAM 1000 MILLIGRAM(S): 250 TABLET, FILM COATED ORAL at 19:13

## 2018-10-18 RX ADMIN — OLANZAPINE 7.5 MILLIGRAM(S): 15 TABLET, FILM COATED ORAL at 12:03

## 2018-10-18 RX ADMIN — SENNA PLUS 2 TABLET(S): 8.6 TABLET ORAL at 02:19

## 2018-10-18 RX ADMIN — PANTOPRAZOLE SODIUM 40 MILLIGRAM(S): 20 TABLET, DELAYED RELEASE ORAL at 07:04

## 2018-10-18 RX ADMIN — POLYETHYLENE GLYCOL 3350 17 GRAM(S): 17 POWDER, FOR SOLUTION ORAL at 19:13

## 2018-10-18 RX ADMIN — DIVALPROEX SODIUM 500 MILLIGRAM(S): 500 TABLET, DELAYED RELEASE ORAL at 07:03

## 2018-10-18 RX ADMIN — Medication 1 SUPPOSITORY(S): at 12:03

## 2018-10-18 RX ADMIN — POLYETHYLENE GLYCOL 3350 17 GRAM(S): 17 POWDER, FOR SOLUTION ORAL at 02:19

## 2018-10-18 RX ADMIN — Medication 400 MILLIGRAM(S): at 07:03

## 2018-10-18 RX ADMIN — ENOXAPARIN SODIUM 40 MILLIGRAM(S): 100 INJECTION SUBCUTANEOUS at 02:19

## 2018-10-18 RX ADMIN — Medication 300 MILLIGRAM(S): at 23:07

## 2018-10-18 RX ADMIN — Medication 300 MILLIGRAM(S): at 02:19

## 2018-10-18 RX ADMIN — DIVALPROEX SODIUM 500 MILLIGRAM(S): 500 TABLET, DELAYED RELEASE ORAL at 19:12

## 2018-10-18 RX ADMIN — SENNA PLUS 2 TABLET(S): 8.6 TABLET ORAL at 23:06

## 2018-10-18 RX ADMIN — LEVETIRACETAM 1000 MILLIGRAM(S): 250 TABLET, FILM COATED ORAL at 07:05

## 2018-10-18 RX ADMIN — Medication 81 MILLIGRAM(S): at 12:03

## 2018-10-18 RX ADMIN — Medication 400 MILLIGRAM(S): at 19:12

## 2018-10-18 RX ADMIN — Medication 0.5 MILLIGRAM(S): at 19:12

## 2018-10-18 RX ADMIN — Medication 40 MILLIGRAM(S): at 07:04

## 2018-10-18 RX ADMIN — Medication 1 TABLET(S): at 12:03

## 2018-10-18 RX ADMIN — Medication 100 MILLIGRAM(S): at 19:12

## 2018-10-18 RX ADMIN — Medication 1 MILLIGRAM(S): at 12:03

## 2018-10-18 NOTE — PROGRESS NOTE ADULT - SUBJECTIVE AND OBJECTIVE BOX
Authored by Hai Obando, DO: Beeper#[phone]    NAYANA JACQUES  53y  Female      Patient is a 53y old  Female who presents with a chief complaint of Obstipation (18 Oct 2018 11:38)      INTERVAL HPI/OVERNIGHT EVENTS: No events overnight. Patient non-verbal, unable to answer questions.            T(C): 36.3 (10-18-18 @ 13:32), Max: 36.3 (10-18-18 @ 13:32)  HR: 76 (10-18-18 @ 13:32) (61 - 76)  BP: 111/76 (10-18-18 @ 13:32) (111/76 - 136/50)  RR: 23 (10-18-18 @ 13:32) (16 - 23)  SpO2: 95% (10-18-18 @ 13:32) (95% - 100%)  Wt(kg): --Vital Signs Last 24 Hrs  T(C): 36.3 (18 Oct 2018 13:32), Max: 36.3 (18 Oct 2018 13:32)  T(F): 97.3 (18 Oct 2018 13:32), Max: 97.3 (18 Oct 2018 13:32)  HR: 76 (18 Oct 2018 13:32) (61 - 76)  BP: 111/76 (18 Oct 2018 13:32) (111/76 - 136/50)  BP(mean): --  RR: 23 (18 Oct 2018 13:32) (16 - 23)  SpO2: 95% (18 Oct 2018 13:32) (95% - 100%)    PHYSICAL EXAM:  GENERAL: in bed, non-verbal, NAD  HEENT:anicteric sclera  CHEST/LUNG: Clear to percussion bilaterally; No rales, rhonchi, wheezing, or rubs.  unofficial POCUS with a-lines anteriorly.  HEART: Regular rate and rhythm; No murmurs, rubs, or gallops  ABDOMEN: Soft, Nontender, Nondistended; Bowel sounds present.    EXTREMITIES:  contration of b/l hands. +2 edema feet, no edema of shins.   SKIN: No rashes or lesions  PSYCH:unable to assess    Consultant(s) Notes Reviewed:  [x ] YES  [ ] NO: Pulmonlogy  Care Discussed with Consultants/Other Providers [ x] YES  [ ] NO    LABS:                        12.5   2.87  )-----------( 141      ( 18 Oct 2018 06:30 )             39.4     10-    145  |  102  |  19  ----------------------------<  86  4.0   |  32<H>  |  0.48<L>    Ca    9.4      18 Oct 2018 06:30    TPro  6.9  /  Alb  3.4  /  TBili  < 0.2<L>  /  DBili  x   /  AST  22  /  ALT  27  /  AlkPhos  124<H>  10-18      Urinalysis Basic - ( 17 Oct 2018 11:54 )    Color: YELLOW / Appearance: CLEAR / S.020 / pH: 6.0  Gluc: NEGATIVE / Ketone: NEGATIVE  / Bili: NEGATIVE / Urobili: NORMAL   Blood: NEGATIVE / Protein: NEGATIVE / Nitrite: NEGATIVE   Leuk Esterase: NEGATIVE / RBC: x / WBC x   Sq Epi: x / Non Sq Epi: x / Bacteria: x      CAPILLARY BLOOD GLUCOSE          ABG - ( 17 Oct 2018 12:30 )  pH, Arterial: 7.36  pH, Blood: x     /  pCO2: 63    /  pO2: 105   / HCO3: 32    / Base Excess: 9.1   /  SaO2: 97.5              Urinalysis Basic - ( 17 Oct 2018 11:54 )    Color: YELLOW / Appearance: CLEAR / S.020 / pH: 6.0  Gluc: NEGATIVE / Ketone: NEGATIVE  / Bili: NEGATIVE / Urobili: NORMAL   Blood: NEGATIVE / Protein: NEGATIVE / Nitrite: NEGATIVE   Leuk Esterase: NEGATIVE / RBC: x / WBC x   Sq Epi: x / Non Sq Epi: x / Bacteria: x        RADIOLOGY & ADDITIONAL TESTS:    Imaging Personally Reviewed:  [ ] YES  [ ] NO

## 2018-10-18 NOTE — PROGRESS NOTE ADULT - PROBLEM SELECTOR PLAN 2
patient stable, most recent blood gas with compensated chronic respiratory acidosis.    -outpatient sleep study,  -no wheezing on exam, is not fluid overloaded at this time.

## 2018-10-18 NOTE — PROGRESS NOTE ADULT - ASSESSMENT
The patient is a 53 year old female with CP (a/w severe ID, baseline non verbal, does not ambulate, however can feed herself, follows some commands), intractable seizures who presents with wheezing, constipation and edema of LE per aid at bedside

## 2018-10-18 NOTE — PROGRESS NOTE ADULT - PROBLEM SELECTOR PLAN 4
Patient with bradycardia w/ hx of 1st degree on EKG from 5/2018 potentially in setting of sedation.  -continue to monitor on tele  -avoid AV blocking meds  -lipid panel for risk factors, may need statin

## 2018-10-18 NOTE — PROGRESS NOTE ADULT - PROBLEM SELECTOR PLAN 5
Hx of LE edema w/ LE US wnl 07/2018 on furosemide  -continue on furosemide, though given mental status should be monitored closely for intravascular depletion.  -outpatient TTE

## 2018-10-18 NOTE — PROGRESS NOTE ADULT - SUBJECTIVE AND OBJECTIVE BOX
CHIEF COMPLAINT:  no complaints    Interval Events:    Patient did not desaturate overnight.   Still breathing room air without issue    REVIEW OF SYSTEMS:  Constitutional: [ ] negative [ ] fevers [ ] chills [ ] weight loss [ ] weight gain  HEENT: [ ] negative [ ] dry eyes [ ] eye irritation [ ] postnasal drip [ ] nasal congestion  CV: [ ] negative  [ ] chest pain [ ] orthopnea [ ] palpitations [ ] murmur  Resp: [ ] negative [ ] cough [ ] shortness of breath [ ] dyspnea [ ] wheezing [ ] sputum [ ] hemoptysis  GI: [ ] negative [ ] nausea [ ] vomiting [ ] diarrhea [ ] constipation [ ] abd pain [ ] dysphagia   : [ ] negative [ ] dysuria [ ] nocturia [ ] hematuria [ ] increased urinary frequency  Musculoskeletal: [ ] negative [ ] back pain [ ] myalgias [ ] arthralgias [ ] fracture  Skin: [ ] negative [ ] rash [ ] itch  Neurological: [ ] negative [ ] headache [ ] dizziness [ ] syncope [ ] weakness [ ] numbness  Psychiatric: [ ] negative [ ] anxiety [ ] depression  Endocrine: [ ] negative [ ] diabetes [ ] thyroid problem  Hematologic/Lymphatic: [ ] negative [ ] anemia [ ] bleeding problem  Allergic/Immunologic: [ ] negative [ ] itchy eyes [ ] nasal discharge [ ] hives [ ] angioedema  [ ] All other systems negative  [ X] Unable to assess ROS because non-verbal    OBJECTIVE:  ICU Vital Signs Last 24 Hrs  T(C): 36.2 (18 Oct 2018 03:03), Max: 36.2 (18 Oct 2018 03:03)  T(F): 97.2 (18 Oct 2018 03:03), Max: 97.2 (18 Oct 2018 03:03)  HR: 68 (18 Oct 2018 07:40) (56 - 69)  BP: 134/87 (18 Oct 2018 03:03) (121/51 - 136/50)  RR: 17 (18 Oct 2018 03:03) (12 - 17)  SpO2: 97% (18 Oct 2018 07:40) (96% - 100%)        CAPILLARY BLOOD GLUCOSE          PHYSICAL EXAM:  General: chronically ill appearing woman in bed  Respiratory: normal effort on room air, contracted  normal breath sounds  Abdomen: non-tender, distended    HOSPITAL MEDICATIONS:  MEDICATIONS  (STANDING):  aspirin enteric coated 81 milliGRAM(s) Oral daily  benztropine 0.5 milliGRAM(s) Oral two times a day  buDESOnide   0.5 milliGRAM(s) Respule 0.5 milliGRAM(s) Inhalation daily  carBAMazepine XR Tablet 400 milliGRAM(s) Oral two times a day  carBAMazepine XR Tablet 100 milliGRAM(s) Oral daily  diVALproex  milliGRAM(s) Oral two times a day  docusate sodium 100 milliGRAM(s) Oral two times a day  enoxaparin Injectable 40 milliGRAM(s) SubCutaneous every 24 hours  fluticasone propionate 50 MICROgram(s)/spray Nasal Spray 1 Spray(s) Both Nostrils two times a day  folic acid 1 milliGRAM(s) Oral daily  furosemide    Tablet 40 milliGRAM(s) Oral daily  glycerin Suppository - Adult 1 Suppository(s) Rectal once  levETIRAcetam 1000 milliGRAM(s) Oral two times a day  multivitamin 1 Tablet(s) Oral daily  OLANZapine 7.5 milliGRAM(s) Oral daily  pantoprazole    Tablet 40 milliGRAM(s) Oral before breakfast  petrolatum Ophthalmic Ointment 1 Application(s) Both EYES two times a day  phenytoin   Suspension 300 milliGRAM(s) Oral at bedtime  phenytoin   Suspension 100 milliGRAM(s) Oral before breakfast  polyethylene glycol 3350 17 Gram(s) Oral two times a day  senna 2 Tablet(s) Oral at bedtime  thiamine 100 milliGRAM(s) Oral daily    MEDICATIONS  (PRN):  ALBUTerol    0.083%. 2.5 milliGRAM(s) Nebulizer once PRN Shortness of Breath and/or Wheezing  AQUAPHOR (petrolatum Ointment) 1 Application(s) Topical two times a day PRN sacrum rash  clotrimazole 1% Cream 1 Application(s) Topical two times a day PRN rash  magnesium hydroxide Suspension 30 milliLiter(s) Oral at bedtime PRN for congestion, for constipation  nystatin Powder 1 Application(s) Topical two times a day PRN erythema of sacrum      LABS:                        12.5   2.87  )-----------( 141      ( 18 Oct 2018 06:30 )             39.4     Hgb Trend: 12.5<--, 12.4<--  10-18    145  |  102  |  19  ----------------------------<  86  4.0   |  32<H>  |  0.48<L>    Ca    9.4      18 Oct 2018 06:30    TPro  6.9  /  Alb  3.4  /  TBili  < 0.2<L>  /  DBili  x   /  AST  22  /  ALT  27  /  AlkPhos  124<H>  10-    Creatinine Trend: 0.48<--, 0.59<--    Urinalysis Basic - ( 17 Oct 2018 11:54 )    Color: YELLOW / Appearance: CLEAR / S.020 / pH: 6.0  Gluc: NEGATIVE / Ketone: NEGATIVE  / Bili: NEGATIVE / Urobili: NORMAL   Blood: NEGATIVE / Protein: NEGATIVE / Nitrite: NEGATIVE   Leuk Esterase: NEGATIVE / RBC: x / WBC x   Sq Epi: x / Non Sq Epi: x / Bacteria: x      Arterial Blood Gas:  10-17 @ 12:30  7.36/63/105/32/97.5/9.1  ABG lactate: 1.0    Venous Blood Gas:  10-17 @ 11:40  7.30/81/30/32/44.9  VBG Lactate: 0.7      MICROBIOLOGY:     Culture - Urine (collected 17 Oct 2018 12:15)  Source: URINE CATHETER  Preliminary Report (18 Oct 2018 08:47):    NO GROWTH TO DATE        RADIOLOGY:  [ ] Reviewed and interpreted by me    PULMONARY FUNCTION TESTS:    EKG: CHIEF COMPLAINT:  no complaints    Interval Events:    Patient did not desaturate overnight.   Still breathing room air without issue    REVIEW OF SYSTEMS:  Constitutional: [ ] negative [ ] fevers [ ] chills [ ] weight loss [ ] weight gain  HEENT: [ ] negative [ ] dry eyes [ ] eye irritation [ ] postnasal drip [ ] nasal congestion  CV: [ ] negative  [ ] chest pain [ ] orthopnea [ ] palpitations [ ] murmur  Resp: [ ] negative [ ] cough [ ] shortness of breath [ ] dyspnea [ ] wheezing [ ] sputum [ ] hemoptysis  GI: [ ] negative [ ] nausea [ ] vomiting [ ] diarrhea [ ] constipation [ ] abd pain [ ] dysphagia   : [ ] negative [ ] dysuria [ ] nocturia [ ] hematuria [ ] increased urinary frequency  Musculoskeletal: [ ] negative [ ] back pain [ ] myalgias [ ] arthralgias [ ] fracture  Skin: [ ] negative [ ] rash [ ] itch  Neurological: [ ] negative [ ] headache [ ] dizziness [ ] syncope [ ] weakness [ ] numbness  Psychiatric: [ ] negative [ ] anxiety [ ] depression  Endocrine: [ ] negative [ ] diabetes [ ] thyroid problem  Hematologic/Lymphatic: [ ] negative [ ] anemia [ ] bleeding problem  Allergic/Immunologic: [ ] negative [ ] itchy eyes [ ] nasal discharge [ ] hives [ ] angioedema  [ ] All other systems negative  [ X] Unable to assess ROS because non-verbal    OBJECTIVE:  ICU Vital Signs Last 24 Hrs  T(C): 36.2 (18 Oct 2018 03:03), Max: 36.2 (18 Oct 2018 03:03)  T(F): 97.2 (18 Oct 2018 03:03), Max: 97.2 (18 Oct 2018 03:03)  HR: 68 (18 Oct 2018 07:40) (56 - 69)  BP: 134/87 (18 Oct 2018 03:03) (121/51 - 136/50)  RR: 17 (18 Oct 2018 03:03) (12 - 17)  SpO2: 97% (18 Oct 2018 07:40) (96% - 100%)        CAPILLARY BLOOD GLUCOSE          PHYSICAL EXAM:  General: chronically ill appearing woman in bed. Awake, alert.  Respiratory: normal effort on room air, contracted  normal breath sounds  CV: RRR. S1S2  Abdomen: non-tender, distended  Pysch: Non-verbal    HOSPITAL MEDICATIONS:  MEDICATIONS  (STANDING):  aspirin enteric coated 81 milliGRAM(s) Oral daily  benztropine 0.5 milliGRAM(s) Oral two times a day  buDESOnide   0.5 milliGRAM(s) Respule 0.5 milliGRAM(s) Inhalation daily  carBAMazepine XR Tablet 400 milliGRAM(s) Oral two times a day  carBAMazepine XR Tablet 100 milliGRAM(s) Oral daily  diVALproex  milliGRAM(s) Oral two times a day  docusate sodium 100 milliGRAM(s) Oral two times a day  enoxaparin Injectable 40 milliGRAM(s) SubCutaneous every 24 hours  fluticasone propionate 50 MICROgram(s)/spray Nasal Spray 1 Spray(s) Both Nostrils two times a day  folic acid 1 milliGRAM(s) Oral daily  furosemide    Tablet 40 milliGRAM(s) Oral daily  glycerin Suppository - Adult 1 Suppository(s) Rectal once  levETIRAcetam 1000 milliGRAM(s) Oral two times a day  multivitamin 1 Tablet(s) Oral daily  OLANZapine 7.5 milliGRAM(s) Oral daily  pantoprazole    Tablet 40 milliGRAM(s) Oral before breakfast  petrolatum Ophthalmic Ointment 1 Application(s) Both EYES two times a day  phenytoin   Suspension 300 milliGRAM(s) Oral at bedtime  phenytoin   Suspension 100 milliGRAM(s) Oral before breakfast  polyethylene glycol 3350 17 Gram(s) Oral two times a day  senna 2 Tablet(s) Oral at bedtime  thiamine 100 milliGRAM(s) Oral daily    MEDICATIONS  (PRN):  ALBUTerol    0.083%. 2.5 milliGRAM(s) Nebulizer once PRN Shortness of Breath and/or Wheezing  AQUAPHOR (petrolatum Ointment) 1 Application(s) Topical two times a day PRN sacrum rash  clotrimazole 1% Cream 1 Application(s) Topical two times a day PRN rash  magnesium hydroxide Suspension 30 milliLiter(s) Oral at bedtime PRN for congestion, for constipation  nystatin Powder 1 Application(s) Topical two times a day PRN erythema of sacrum      LABS:                        12.5   2.87  )-----------( 141      ( 18 Oct 2018 06:30 )             39.4     Hgb Trend: 12.5<--, 12.4<--  10-18    145  |  102  |  19  ----------------------------<  86  4.0   |  32<H>  |  0.48<L>    Ca    9.4      18 Oct 2018 06:30    TPro  6.9  /  Alb  3.4  /  TBili  < 0.2<L>  /  DBili  x   /  AST  22  /  ALT  27  /  AlkPhos  124<H>  10-18    Creatinine Trend: 0.48<--, 0.59<--    Urinalysis Basic - ( 17 Oct 2018 11:54 )    Color: YELLOW / Appearance: CLEAR / S.020 / pH: 6.0  Gluc: NEGATIVE / Ketone: NEGATIVE  / Bili: NEGATIVE / Urobili: NORMAL   Blood: NEGATIVE / Protein: NEGATIVE / Nitrite: NEGATIVE   Leuk Esterase: NEGATIVE / RBC: x / WBC x   Sq Epi: x / Non Sq Epi: x / Bacteria: x      Arterial Blood Gas:  10-17 @ 12:30  7.36/63/105/32/97.5/9.1  ABG lactate: 1.0    Venous Blood Gas:  10-17 @ 11:40  7.30/81/30/32/44.9  VBG Lactate: 0.7      MICROBIOLOGY:     Culture - Urine (collected 17 Oct 2018 12:15)  Source: URINE CATHETER  Preliminary Report (18 Oct 2018 08:47):    NO GROWTH TO DATE        RADIOLOGY:  [ ] Reviewed and interpreted by me    PULMONARY FUNCTION TESTS:    EKG:

## 2018-10-18 NOTE — PROGRESS NOTE ADULT - ATTENDING COMMENTS
#moderate neutropenia  -unclear etiology, no intervention at this time, repeat CBC as outpatietn.    D/c planning likely tomorrow pending BM and acceptance back to facility.

## 2018-10-18 NOTE — PROGRESS NOTE ADULT - PROBLEM SELECTOR PLAN 3
Patient on several antiepileptics due to recent MICU for status epilecticus. No signs of seizure activity  -continue home divalproex, phenytoin, levetiracetam, carabamazepine  -seizure precautions

## 2018-10-18 NOTE — PROGRESS NOTE ADULT - ASSESSMENT
Assessment    Chronic CO2 retention in patient with cerebral palsy. Probably some central component due to underlying brain disease.   Now stable. On presentation appeared to be in chronic stable state given high bicarbonate.   Initial VBG is low quality given non-physiologic PO2 and saturation of 45%. Would make any decision based on that pCO2     Recs  Outpatient sleep study  No further inpatient pulmonary interventions.       Magalys Levi MD  Pulmonary/Critical Care Fellow  page: [phone] Assessment    Chronic CO2 retention in patient with cerebral palsy. Probably some central component due to underlying brain disease.   Now stable. On presentation appeared to be in chronic stable state given high bicarbonate.   Initial VBG is low quality given non-physiologic PO2 and saturation of 45%. Would make any decision based on that pCO2     Recs  Outpatient sleep study  No further inpatient pulmonary interventions.       Please re-call with questions    Magalys Levi MD  Pulmonary/Critical Care Fellow  page: [phone] Assessment    Chronic CO2 retention in patient with cerebral palsy. Probably some central component due to underlying brain disease.   Now stable. On presentation appeared to be in chronic stable state given high bicarbonate.   Initial VBG is low quality given non-physiologic PO2 and saturation of 45%. Would not make any decision based on that pCO2     Recs  Outpatient sleep study  No further inpatient pulmonary interventions.       Please re-call with questions    Magalys Levi MD  Pulmonary/Critical Care Fellow  page: [phone]

## 2018-10-19 DIAGNOSIS — N93.9 ABNORMAL UTERINE AND VAGINAL BLEEDING, UNSPECIFIED: ICD-10-CM

## 2018-10-19 LAB
BUN SERPL-MCNC: 22 MG/DL — SIGNIFICANT CHANGE UP (ref 7–23)
CALCIUM SERPL-MCNC: 9.3 MG/DL — SIGNIFICANT CHANGE UP (ref 8.4–10.5)
CHLORIDE SERPL-SCNC: 98 MMOL/L — SIGNIFICANT CHANGE UP (ref 98–107)
CO2 SERPL-SCNC: 34 MMOL/L — HIGH (ref 22–31)
CREAT SERPL-MCNC: 0.69 MG/DL — SIGNIFICANT CHANGE UP (ref 0.5–1.3)
GLUCOSE SERPL-MCNC: 102 MG/DL — HIGH (ref 70–99)
HCT VFR BLD CALC: 39.8 % — SIGNIFICANT CHANGE UP (ref 34.5–45)
HGB BLD-MCNC: 12.7 G/DL — SIGNIFICANT CHANGE UP (ref 11.5–15.5)
MAGNESIUM SERPL-MCNC: 1.7 MG/DL — SIGNIFICANT CHANGE UP (ref 1.6–2.6)
MCHC RBC-ENTMCNC: 31.9 % — LOW (ref 32–36)
MCHC RBC-ENTMCNC: 33 PG — SIGNIFICANT CHANGE UP (ref 27–34)
MCV RBC AUTO: 103.4 FL — HIGH (ref 80–100)
NRBC # FLD: 0 — SIGNIFICANT CHANGE UP
PLATELET # BLD AUTO: 159 K/UL — SIGNIFICANT CHANGE UP (ref 150–400)
PMV BLD: 10.8 FL — SIGNIFICANT CHANGE UP (ref 7–13)
POTASSIUM SERPL-MCNC: 4.6 MMOL/L — SIGNIFICANT CHANGE UP (ref 3.5–5.3)
POTASSIUM SERPL-SCNC: 4.6 MMOL/L — SIGNIFICANT CHANGE UP (ref 3.5–5.3)
RBC # BLD: 3.85 M/UL — SIGNIFICANT CHANGE UP (ref 3.8–5.2)
RBC # FLD: 14.3 % — SIGNIFICANT CHANGE UP (ref 10.3–14.5)
SODIUM SERPL-SCNC: 144 MMOL/L — SIGNIFICANT CHANGE UP (ref 135–145)
WBC # BLD: 4.24 K/UL — SIGNIFICANT CHANGE UP (ref 3.8–10.5)
WBC # FLD AUTO: 4.24 K/UL — SIGNIFICANT CHANGE UP (ref 3.8–10.5)

## 2018-10-19 PROCEDURE — 99232 SBSQ HOSP IP/OBS MODERATE 35: CPT

## 2018-10-19 PROCEDURE — 76856 US EXAM PELVIC COMPLETE: CPT | Mod: 26

## 2018-10-19 RX ORDER — MINERAL OIL
133 OIL (ML) MISCELLANEOUS ONCE
Refills: 0 | Status: COMPLETED | OUTPATIENT
Start: 2018-10-19 | End: 2018-10-19

## 2018-10-19 RX ORDER — DOCUSATE SODIUM 100 MG
100 CAPSULE ORAL
Refills: 0 | Status: DISCONTINUED | OUTPATIENT
Start: 2018-10-19 | End: 2018-10-29

## 2018-10-19 RX ADMIN — LEVETIRACETAM 1000 MILLIGRAM(S): 250 TABLET, FILM COATED ORAL at 06:40

## 2018-10-19 RX ADMIN — Medication 1 TABLET(S): at 12:33

## 2018-10-19 RX ADMIN — ALBUTEROL 2.5 MILLIGRAM(S): 90 AEROSOL, METERED ORAL at 02:14

## 2018-10-19 RX ADMIN — DIVALPROEX SODIUM 500 MILLIGRAM(S): 500 TABLET, DELAYED RELEASE ORAL at 18:34

## 2018-10-19 RX ADMIN — Medication 0.5 MILLIGRAM(S): at 18:34

## 2018-10-19 RX ADMIN — Medication 1 DROP(S): at 18:35

## 2018-10-19 RX ADMIN — Medication 300 MILLIGRAM(S): at 22:07

## 2018-10-19 RX ADMIN — Medication 133 MILLILITER(S): at 16:45

## 2018-10-19 RX ADMIN — POLYETHYLENE GLYCOL 3350 17 GRAM(S): 17 POWDER, FOR SOLUTION ORAL at 18:35

## 2018-10-19 RX ADMIN — Medication 81 MILLIGRAM(S): at 12:32

## 2018-10-19 RX ADMIN — OLANZAPINE 7.5 MILLIGRAM(S): 15 TABLET, FILM COATED ORAL at 12:33

## 2018-10-19 RX ADMIN — LEVETIRACETAM 1000 MILLIGRAM(S): 250 TABLET, FILM COATED ORAL at 18:34

## 2018-10-19 RX ADMIN — Medication 0.5 MILLIGRAM(S): at 06:40

## 2018-10-19 RX ADMIN — Medication 0.5 MILLIGRAM(S): at 11:37

## 2018-10-19 RX ADMIN — Medication 1 MILLIGRAM(S): at 12:32

## 2018-10-19 RX ADMIN — Medication 40 MILLIGRAM(S): at 06:40

## 2018-10-19 RX ADMIN — ENOXAPARIN SODIUM 40 MILLIGRAM(S): 100 INJECTION SUBCUTANEOUS at 18:34

## 2018-10-19 RX ADMIN — SENNA PLUS 2 TABLET(S): 8.6 TABLET ORAL at 22:07

## 2018-10-19 RX ADMIN — Medication 400 MILLIGRAM(S): at 18:34

## 2018-10-19 RX ADMIN — Medication 100 MILLIGRAM(S): at 06:39

## 2018-10-19 RX ADMIN — Medication 400 MILLIGRAM(S): at 06:39

## 2018-10-19 RX ADMIN — PANTOPRAZOLE SODIUM 40 MILLIGRAM(S): 20 TABLET, DELAYED RELEASE ORAL at 06:39

## 2018-10-19 RX ADMIN — Medication 100 MILLIGRAM(S): at 18:34

## 2018-10-19 RX ADMIN — Medication 100 MILLIGRAM(S): at 12:32

## 2018-10-19 RX ADMIN — Medication 100 MILLIGRAM(S): at 12:33

## 2018-10-19 RX ADMIN — POLYETHYLENE GLYCOL 3350 17 GRAM(S): 17 POWDER, FOR SOLUTION ORAL at 06:45

## 2018-10-19 RX ADMIN — DIVALPROEX SODIUM 500 MILLIGRAM(S): 500 TABLET, DELAYED RELEASE ORAL at 06:40

## 2018-10-19 RX ADMIN — Medication 1 ENEMA: at 10:11

## 2018-10-19 RX ADMIN — Medication 1 SPRAY(S): at 10:10

## 2018-10-19 RX ADMIN — Medication 1 DROP(S): at 06:47

## 2018-10-19 NOTE — PROGRESS NOTE ADULT - PROBLEM SELECTOR PLAN 1
continue with aggressive bowel regimen until BM.   Fleet ordered for today   CT abdomen with No bowel obstruction No BM yet   continue with aggressive bowel regimen until BM.   Fleet enema  ordered for today   CT abdomen with No bowel obstruction

## 2018-10-19 NOTE — PROVIDER CONTACT NOTE (OTHER) - ASSESSMENT
pt alert but nonverbal this is pts baseline. random back n forth sways of the head
while bathing patient, notified moderate bright red blood coming from vagina. Aide that is regularly with her states that she does not get periods.

## 2018-10-19 NOTE — PROVIDER CONTACT NOTE (OTHER) - ACTION/TREATMENT ORDERED:
Tele PA notified. will come to assess patient
NEFTALY Stephen made aware, assesed pt---Seizure precautions maintained. Will continue to monitor

## 2018-10-19 NOTE — PROGRESS NOTE ADULT - PROBLEM SELECTOR PLAN 2
Patient's aid states that she no longer has her menses - unknown how long.  Gyn called for consult for possible post menopausal bleed. Awaiting GYN for recommendations.

## 2018-10-19 NOTE — PROGRESS NOTE ADULT - SUBJECTIVE AND OBJECTIVE BOX
Patient is a 53y old  Female who presents with a chief complaint of Obstipation (18 Oct 2018 11:38)      INTERVAL HPI/OVERNIGHT EVENTS:   Reviewed events from overnight- pt with vaginal bleeding. Gyn called for consult for possible post menopausal bleed.    Vital Signs Last 24 Hrs  T(C): 36.2 (19 Oct 2018 06:14), Max: 36.3 (18 Oct 2018 13:32)  T(F): 97.2 (19 Oct 2018 06:14), Max: 97.3 (18 Oct 2018 13:32)  HR: 71 (19 Oct 2018 08:01) (67 - 77)  BP: 136/75 (19 Oct 2018 06:14) (111/76 - 136/75)  BP(mean): --  RR: 19 (19 Oct 2018 08:01) (19 - 23)  SpO2: 99% (19 Oct 2018 08:01) (95% - 99%)      PHYSICAL EXAM:  GENERAL: in bed, non-verbal, NAD  HEENT:anicteric sclera  CHEST/LUNG: Clear to percussion bilaterally; No rales, rhonchi, wheezing, or rubs.   HEART: Regular rate and rhythm; No murmurs, rubs, or gallops  ABDOMEN: Soft, Nontender, Nondistended; Bowel sounds present.    EXTREMITIES:  contraction of b/l hands. +2 edema feet, no edema of shins.   SKIN: No rashes or lesions  PSYCH:unable to assess    Consultant(s) Notes Reviewed:  [x ] YES  [ ] NO: Pulmonlogy  Care Discussed with Consultants/Other Providers [ x] YES  [ ] NO    LABS:                                             12.7   4.24  )-----------( 159      ( 19 Oct 2018 07:00 )             39.8     10-19    144  |  98  |  22  ----------------------------<  102<H>  4.6   |  34<H>  |  0.69    Ca    9.3      19 Oct 2018 07:00  Mg     1.7     10-19    TPro  6.9  /  Alb  3.4  /  TBili  < 0.2<L>  /  DBili  x   /  AST  22  /  ALT  27  /  AlkPhos  124<H>  10-18    Color: YELLOW / Appearance: CLEAR / S.020 / pH: 6.0  Gluc: NEGATIVE / Ketone: NEGATIVE  / Bili: NEGATIVE / Urobili: NORMAL   Blood: NEGATIVE / Protein: NEGATIVE / Nitrite: NEGATIVE   Leuk Esterase: NEGATIVE / RBC: x / WBC x   Sq Epi: x / Non Sq Epi: x / Bacteria: x      CAPILLARY BLOOD GLUCOSE          RADIOLOGY & ADDITIONAL TESTS:    Imaging Personally Reviewed:  [ ] YES  [ ] NO Patient is a 53y old  Female who presents with a chief complaint of Obstipation (18 Oct 2018 11:38)      INTERVAL HPI/OVERNIGHT EVENTS: Pt seen and examined by me Aide at bedside   Reviewed events from overnight- pt with vaginal bleeding. Gyn called for consult for possible post menopausal bleed.    Vital Signs Last 24 Hrs  T(C): 36.2 (19 Oct 2018 06:14), Max: 36.3 (18 Oct 2018 13:32)  T(F): 97.2 (19 Oct 2018 06:14), Max: 97.3 (18 Oct 2018 13:32)  HR: 71 (19 Oct 2018 08:01) (67 - 77)  BP: 136/75 (19 Oct 2018 06:14) (111/76 - 136/75)  BP(mean): --  RR: 19 (19 Oct 2018 08:01) (19 - 23)  SpO2: 99% (19 Oct 2018 08:01) (95% - 99%)      PHYSICAL EXAM:  GENERAL: in bed, non-verbal, NAD  HEENT:anicteric sclera  CHEST/LUNG: Clear to percussion bilaterally; No rales, rhonchi, wheezing, or rubs.   HEART: Regular rate and rhythm; No murmurs, rubs, or gallops  ABDOMEN: Soft, Nontender, Nondistended; Bowel sounds present.    EXTREMITIES:  contraction of b/l hands. +2 edema feet, no edema of shins.   SKIN: No rashes or lesions  PSYCH:unable to assess    Consultant(s) Notes Reviewed:  [x ] YES  [ ] NO: Pulmonlogy  Care Discussed with Consultants/Other Providers [ x] YES  [ ] NO    LABS:                                             12.7   4.24  )-----------( 159      ( 19 Oct 2018 07:00 )             39.8     10-19    144  |  98  |  22  ----------------------------<  102<H>  4.6   |  34<H>  |  0.69    Ca    9.3      19 Oct 2018 07:00  Mg     1.7     10-19    TPro  6.9  /  Alb  3.4  /  TBili  < 0.2<L>  /  DBili  x   /  AST  22  /  ALT  27  /  AlkPhos  124<H>  10-18    Color: YELLOW / Appearance: CLEAR / S.020 / pH: 6.0  Gluc: NEGATIVE / Ketone: NEGATIVE  / Bili: NEGATIVE / Urobili: NORMAL   Blood: NEGATIVE / Protein: NEGATIVE / Nitrite: NEGATIVE   Leuk Esterase: NEGATIVE / RBC: x / WBC x   Sq Epi: x / Non Sq Epi: x / Bacteria: x      CAPILLARY BLOOD GLUCOSE          RADIOLOGY & ADDITIONAL TESTS:    Imaging Personally Reviewed:  [ ] YES  [ ] NO

## 2018-10-19 NOTE — CHART NOTE - NSCHARTNOTEFT_GEN_A_CORE
Called by RN due to patient having moderate vaginal bleeding as though it was her menstruation. Patient's aid states that she no longer has her menses - unknown how long.  Gyn called for consult for possible post menopausal bleed. Awaiting GYN for recommendations.  Addendum note was written on 10/18/18.

## 2018-10-19 NOTE — PROVIDER CONTACT NOTE (OTHER) - SITUATION
pt having random back n forth sways of the head , RN suspicious of seizure activity
noticed patient having vaginal bleed. notified PA

## 2018-10-20 DIAGNOSIS — G93.40 ENCEPHALOPATHY, UNSPECIFIED: ICD-10-CM

## 2018-10-20 LAB
BUN SERPL-MCNC: 23 MG/DL — SIGNIFICANT CHANGE UP (ref 7–23)
CALCIUM SERPL-MCNC: 9.3 MG/DL — SIGNIFICANT CHANGE UP (ref 8.4–10.5)
CHLORIDE SERPL-SCNC: 96 MMOL/L — LOW (ref 98–107)
CO2 SERPL-SCNC: 35 MMOL/L — HIGH (ref 22–31)
CREAT SERPL-MCNC: 0.67 MG/DL — SIGNIFICANT CHANGE UP (ref 0.5–1.3)
GLUCOSE SERPL-MCNC: 83 MG/DL — SIGNIFICANT CHANGE UP (ref 70–99)
HCG SERPL-ACNC: < 5 MIU/ML — SIGNIFICANT CHANGE UP
HCT VFR BLD CALC: 39.5 % — SIGNIFICANT CHANGE UP (ref 34.5–45)
HGB BLD-MCNC: 12.6 G/DL — SIGNIFICANT CHANGE UP (ref 11.5–15.5)
MAGNESIUM SERPL-MCNC: 1.8 MG/DL — SIGNIFICANT CHANGE UP (ref 1.6–2.6)
MCHC RBC-ENTMCNC: 31.9 % — LOW (ref 32–36)
MCHC RBC-ENTMCNC: 32.7 PG — SIGNIFICANT CHANGE UP (ref 27–34)
MCV RBC AUTO: 102.6 FL — HIGH (ref 80–100)
NRBC # FLD: 0 — SIGNIFICANT CHANGE UP
PLATELET # BLD AUTO: 148 K/UL — LOW (ref 150–400)
PMV BLD: 10.7 FL — SIGNIFICANT CHANGE UP (ref 7–13)
POTASSIUM SERPL-MCNC: 4.2 MMOL/L — SIGNIFICANT CHANGE UP (ref 3.5–5.3)
POTASSIUM SERPL-SCNC: 4.2 MMOL/L — SIGNIFICANT CHANGE UP (ref 3.5–5.3)
RBC # BLD: 3.85 M/UL — SIGNIFICANT CHANGE UP (ref 3.8–5.2)
RBC # FLD: 14.1 % — SIGNIFICANT CHANGE UP (ref 10.3–14.5)
SODIUM SERPL-SCNC: 144 MMOL/L — SIGNIFICANT CHANGE UP (ref 135–145)
VALPROATE SERPL-MCNC: 15 UG/ML — LOW (ref 50–100)
WBC # BLD: 3.33 K/UL — LOW (ref 3.8–10.5)
WBC # FLD AUTO: 3.33 K/UL — LOW (ref 3.8–10.5)

## 2018-10-20 PROCEDURE — 99233 SBSQ HOSP IP/OBS HIGH 50: CPT

## 2018-10-20 RX ORDER — LEVETIRACETAM 250 MG/1
1000 TABLET, FILM COATED ORAL EVERY 12 HOURS
Refills: 0 | Status: DISCONTINUED | OUTPATIENT
Start: 2018-10-20 | End: 2018-11-01

## 2018-10-20 RX ORDER — VALPROIC ACID (AS SODIUM SALT) 250 MG/5ML
500 SOLUTION, ORAL ORAL
Refills: 0 | Status: DISCONTINUED | OUTPATIENT
Start: 2018-10-20 | End: 2018-10-27

## 2018-10-20 RX ORDER — SODIUM CHLORIDE 9 MG/ML
1000 INJECTION INTRAMUSCULAR; INTRAVENOUS; SUBCUTANEOUS
Refills: 0 | Status: DISCONTINUED | OUTPATIENT
Start: 2018-10-20 | End: 2018-10-21

## 2018-10-20 RX ORDER — MINERAL OIL
133 OIL (ML) MISCELLANEOUS ONCE
Refills: 0 | Status: COMPLETED | OUTPATIENT
Start: 2018-10-20 | End: 2018-10-21

## 2018-10-20 RX ADMIN — Medication 0.5 MILLIGRAM(S): at 10:05

## 2018-10-20 RX ADMIN — ENOXAPARIN SODIUM 40 MILLIGRAM(S): 100 INJECTION SUBCUTANEOUS at 18:04

## 2018-10-20 RX ADMIN — Medication 1 DROP(S): at 05:33

## 2018-10-20 RX ADMIN — Medication 27.5 MILLIGRAM(S): at 16:17

## 2018-10-20 RX ADMIN — Medication 100 MILLIGRAM(S): at 06:55

## 2018-10-20 RX ADMIN — Medication 0.5 MILLIGRAM(S): at 21:38

## 2018-10-20 RX ADMIN — SODIUM CHLORIDE 75 MILLILITER(S): 9 INJECTION INTRAMUSCULAR; INTRAVENOUS; SUBCUTANEOUS at 20:23

## 2018-10-20 RX ADMIN — Medication 10 MILLIGRAM(S): at 05:35

## 2018-10-20 RX ADMIN — Medication 1 SPRAY(S): at 18:02

## 2018-10-20 RX ADMIN — SODIUM CHLORIDE 75 MILLILITER(S): 9 INJECTION INTRAMUSCULAR; INTRAVENOUS; SUBCUTANEOUS at 13:53

## 2018-10-20 RX ADMIN — LEVETIRACETAM 400 MILLIGRAM(S): 250 TABLET, FILM COATED ORAL at 15:17

## 2018-10-20 RX ADMIN — Medication 1 SPRAY(S): at 05:19

## 2018-10-20 RX ADMIN — Medication 1 DROP(S): at 18:03

## 2018-10-20 NOTE — CONSULT NOTE ADULT - SUBJECTIVE AND OBJECTIVE BOX
HPI:    Patient is a 53F with a history of cerebral palsy with spastic quadriplegia and seizure disorder who presented to the ED with constipation. Patient lives in a group facility and is accompanied by an aide at bedside who assists with history. Patient is non-verbal at baseline and wheelchair bound. She does not follow commands but is able to feed herself. Per aide, patient currently appears to be at baseline and is showing no signs of agitation. She has a history of seizures for which she takes keppra, depakote, phenytoin, and carbamazepine. She was in the ICU a few months ago due to status epilepticus. Patient has been more lethargic today and did not want to take her PO meds this morning including her seizure medications. Neurology was consulted for further recommendations.     MEDICATIONS  (STANDING):  artificial tears (preservative free) Ophthalmic Solution 1 Drop(s) Both EYES two times a day  aspirin enteric coated 81 milliGRAM(s) Oral daily  benztropine 0.5 milliGRAM(s) Oral two times a day  buDESOnide   0.5 milliGRAM(s) Respule 0.5 milliGRAM(s) Inhalation daily  carBAMazepine XR Tablet 400 milliGRAM(s) Oral two times a day  carBAMazepine XR Tablet 100 milliGRAM(s) Oral daily  docusate sodium Liquid 100 milliGRAM(s) Oral two times a day  enoxaparin Injectable 40 milliGRAM(s) SubCutaneous every 24 hours  fluticasone propionate 50 MICROgram(s)/spray Nasal Spray 1 Spray(s) Both Nostrils two times a day  folic acid 1 milliGRAM(s) Oral daily  furosemide    Tablet 40 milliGRAM(s) Oral daily  levETIRAcetam  IVPB 1000 milliGRAM(s) IV Intermittent every 12 hours  mineral oil enema 133 milliLiter(s) Rectal once  multivitamin 1 Tablet(s) Oral daily  OLANZapine 7.5 milliGRAM(s) Oral daily  pantoprazole    Tablet 40 milliGRAM(s) Oral before breakfast  phenytoin   Suspension 300 milliGRAM(s) Oral at bedtime  phenytoin   Suspension 100 milliGRAM(s) Oral before breakfast  polyethylene glycol 3350 17 Gram(s) Oral two times a day  senna 2 Tablet(s) Oral at bedtime  sodium chloride 0.9%. 1000 milliLiter(s) (75 mL/Hr) IV Continuous <Continuous>  thiamine 100 milliGRAM(s) Oral daily  valproate sodium IVPB 500 milliGRAM(s) IV Intermittent two times a day    MEDICATIONS  (PRN):  AQUAPHOR (petrolatum Ointment) 1 Application(s) Topical two times a day PRN sacrum rash  bisacodyl Suppository 10 milliGRAM(s) Rectal daily PRN Constipation  clotrimazole 1% Cream 1 Application(s) Topical two times a day PRN rash  magnesium hydroxide Suspension 30 milliLiter(s) Oral at bedtime PRN for congestion, for constipation  nystatin Powder 1 Application(s) Topical two times a day PRN erythema of sacrum    PAST MEDICAL & SURGICAL HISTORY:  Intellectual disability  Constipation  Seizure disorder  Cerebral palsy  No significant past surgical history    FAMILY HISTORY:  No pertinent family history in first degree relatives    Allergies    Topamax (Unknown)      Review of Systems:  Unable to obtain      Vital Signs Last 24 Hrs  T(C): 36.3 (20 Oct 2018 21:56), Max: 36.4 (20 Oct 2018 05:12)  T(F): 97.3 (20 Oct 2018 21:56), Max: 97.6 (20 Oct 2018 05:12)  HR: 85 (20 Oct 2018 21:56) (65 - 85)  BP: 103/66 (20 Oct 2018 21:56) (101/62 - 124/89)  BP(mean): 79 (20 Oct 2018 21:56) (79 - 79)  RR: 18 (20 Oct 2018 21:56) (16 - 18)  SpO2: 94% (20 Oct 2018 21:56) (94% - 97%)    General Exam:   General appearance: No acute distress    Neurological Exam:  Mental Status: awake, alert, twitches head occasionally, non-verbal  Cranial Nerves:   PERRL, EOMI.   No facial asymmetry.   Motor:   Tone: spastic quadriplegia                    10-20    144  |  96<L>  |  23  ----------------------------<  83  4.2   |  35<H>  |  0.67    Ca    9.3      20 Oct 2018 06:25  Mg     1.8     10-20                              12.6   3.33  )-----------( 148      ( 20 Oct 2018 06:25 )             39.5

## 2018-10-20 NOTE — PROGRESS NOTE ADULT - PROBLEM SELECTOR PLAN 5
Patient with bradycardia w/ hx of 1st degree on EKG from 5/2018 potentially in setting of sedation.  -continue to monitor on tele  -avoid AV blocking meds  -lipid panel for risk factors, may need statin Patient on several antiepileptics due to recent MICU for status epilecticus. No signs of seizure activity  -continue home divalproex, phenytoin, levetiracetam, carabamazepine  -seizure precautions

## 2018-10-20 NOTE — PROGRESS NOTE ADULT - PROBLEM SELECTOR PLAN 2
Patient's aid states that she no longer has her menses - unknown how long.  Pelvic ultrasound -no gross abnormality on limited transabdominal examination.     Gyn called for consult for possible post menopausal bleed. Awaiting GYN for recommendations. Improving   Pt with medium sized BM today   continue with aggressive bowel regimen   s/p  Fleet enema   yesterday , will order another fleet today as pt with decreased PO intake   CT abdomen with No bowel obstruction

## 2018-10-20 NOTE — CHART NOTE - NSCHARTNOTEFT_GEN_A_CORE
Contacted GYN resident regarding vaginal bleeding.  Will come and evaluate.  Jaky HIGGINBOTHAM Contacted GYN resident regarding vaginal bleeding.  Will come and evaluate. Patient had medium size BM.  Jaky HIGGINBOTHAM

## 2018-10-20 NOTE — PROGRESS NOTE ADULT - PROBLEM SELECTOR PLAN 4
Patient on several antiepileptics due to recent MICU for status epilecticus. No signs of seizure activity  -continue home divalproex, phenytoin, levetiracetam, carabamazepine  -seizure precautions patient stable, most recent blood gas with compensated chronic respiratory acidosis.    -outpatient sleep study,  -no wheezing on exam, is not fluid overloaded at this time.

## 2018-10-20 NOTE — PROGRESS NOTE ADULT - SUBJECTIVE AND OBJECTIVE BOX
Patient is a 53y old  Female who presents with a chief complaint of Obstipation (18 Oct 2018 11:38)      INTERVAL HPI/OVERNIGHT EVENTS: Pt seen and examined by me Aide at bedside       Vital Signs Last 24 Hrs  T(C): 36.4 (20 Oct 2018 05:12), Max: 36.4 (19 Oct 2018 13:29)  T(F): 97.6 (20 Oct 2018 05:12), Max: 97.6 (19 Oct 2018 13:29)  HR: 65 (20 Oct 2018 05:12) (65 - 78)  BP: 101/62 (20 Oct 2018 05:12) (98/65 - 107/63)  BP(mean): --  RR: 16 (20 Oct 2018 05:12) (16 - 19)  SpO2: 95% (20 Oct 2018 05:12) (91% - 98%)    MEDICATIONS  (STANDING):  artificial tears (preservative free) Ophthalmic Solution 1 Drop(s) Both EYES two times a day  aspirin enteric coated 81 milliGRAM(s) Oral daily  benztropine 0.5 milliGRAM(s) Oral two times a day  buDESOnide   0.5 milliGRAM(s) Respule 0.5 milliGRAM(s) Inhalation daily  carBAMazepine XR Tablet 400 milliGRAM(s) Oral two times a day  carBAMazepine XR Tablet 100 milliGRAM(s) Oral daily  diVALproex  milliGRAM(s) Oral two times a day  docusate sodium Liquid 100 milliGRAM(s) Oral two times a day  enoxaparin Injectable 40 milliGRAM(s) SubCutaneous every 24 hours  fluticasone propionate 50 MICROgram(s)/spray Nasal Spray 1 Spray(s) Both Nostrils two times a day  folic acid 1 milliGRAM(s) Oral daily  furosemide    Tablet 40 milliGRAM(s) Oral daily  levETIRAcetam 1000 milliGRAM(s) Oral two times a day  multivitamin 1 Tablet(s) Oral daily  OLANZapine 7.5 milliGRAM(s) Oral daily  pantoprazole    Tablet 40 milliGRAM(s) Oral before breakfast  phenytoin   Suspension 300 milliGRAM(s) Oral at bedtime  phenytoin   Suspension 100 milliGRAM(s) Oral before breakfast  polyethylene glycol 3350 17 Gram(s) Oral two times a day  senna 2 Tablet(s) Oral at bedtime  thiamine 100 milliGRAM(s) Oral daily    MEDICATIONS  (PRN):  AQUAPHOR (petrolatum Ointment) 1 Application(s) Topical two times a day PRN sacrum rash  bisacodyl Suppository 10 milliGRAM(s) Rectal daily PRN Constipation  clotrimazole 1% Cream 1 Application(s) Topical two times a day PRN rash  magnesium hydroxide Suspension 30 milliLiter(s) Oral at bedtime PRN for congestion, for constipation  nystatin Powder 1 Application(s) Topical two times a day PRN erythema of sacrum    PHYSICAL EXAM:  GENERAL: in bed, non-verbal, NAD  HEENT:anicteric sclera  CHEST/LUNG: Clear to percussion bilaterally; No rales, rhonchi, wheezing, or rubs.   HEART: Regular rate and rhythm; No murmurs, rubs, or gallops  ABDOMEN: Soft, Nontender, Nondistended; Bowel sounds present.    EXTREMITIES:  contraction of b/l hands. +2 edema feet, no edema of shins.   SKIN: No rashes or lesions  PSYCH:unable to assess    Consultant(s) Notes Reviewed:  [x ] YES  [ ] NO: Pulmonlogy  Care Discussed with Consultants/Other Providers [ x] YES  [ ] NO    LABS:                                                                 12.6   3.33  )-----------( 148      ( 20 Oct 2018 06:25 )             39.5     10-20    144  |  96<L>  |  23  ----------------------------<  83  4.2   |  35<H>  |  0.67    Ca    9.3      20 Oct 2018 06:25  Mg     1.8     10-20    CAPILLARY BLOOD GLUCOSE          RADIOLOGY & ADDITIONAL TESTS:    Imaging Personally Reviewed:  [ ] YES  [ ] NO Patient is a 53y old  Female who presents with a chief complaint of Obstipation (18 Oct 2018 11:38)      INTERVAL HPI/OVERNIGHT EVENTS: Pt seen and examined by me Aide and family  at bedside   As per aide and family, pt more lethargic, more sleepy  Not eating this AM. pt is is usually a good eater  Denies fever, cough, chills   Pt had medium sized BM this noon       Vital Signs Last 24 Hrs  T(C): 36.4 (20 Oct 2018 05:12), Max: 36.4 (19 Oct 2018 13:29)  T(F): 97.6 (20 Oct 2018 05:12), Max: 97.6 (19 Oct 2018 13:29)  HR: 65 (20 Oct 2018 05:12) (65 - 78)  BP: 101/62 (20 Oct 2018 05:12) (98/65 - 107/63)  BP(mean): --  RR: 16 (20 Oct 2018 05:12) (16 - 19)  SpO2: 95% (20 Oct 2018 05:12) (91% - 98%)    MEDICATIONS  (STANDING):  artificial tears (preservative free) Ophthalmic Solution 1 Drop(s) Both EYES two times a day  aspirin enteric coated 81 milliGRAM(s) Oral daily  benztropine 0.5 milliGRAM(s) Oral two times a day  buDESOnide   0.5 milliGRAM(s) Respule 0.5 milliGRAM(s) Inhalation daily  carBAMazepine XR Tablet 400 milliGRAM(s) Oral two times a day  carBAMazepine XR Tablet 100 milliGRAM(s) Oral daily  diVALproex  milliGRAM(s) Oral two times a day  docusate sodium Liquid 100 milliGRAM(s) Oral two times a day  enoxaparin Injectable 40 milliGRAM(s) SubCutaneous every 24 hours  fluticasone propionate 50 MICROgram(s)/spray Nasal Spray 1 Spray(s) Both Nostrils two times a day  folic acid 1 milliGRAM(s) Oral daily  furosemide    Tablet 40 milliGRAM(s) Oral daily  levETIRAcetam 1000 milliGRAM(s) Oral two times a day  multivitamin 1 Tablet(s) Oral daily  OLANZapine 7.5 milliGRAM(s) Oral daily  pantoprazole    Tablet 40 milliGRAM(s) Oral before breakfast  phenytoin   Suspension 300 milliGRAM(s) Oral at bedtime  phenytoin   Suspension 100 milliGRAM(s) Oral before breakfast  polyethylene glycol 3350 17 Gram(s) Oral two times a day  senna 2 Tablet(s) Oral at bedtime  thiamine 100 milliGRAM(s) Oral daily    MEDICATIONS  (PRN):  AQUAPHOR (petrolatum Ointment) 1 Application(s) Topical two times a day PRN sacrum rash  bisacodyl Suppository 10 milliGRAM(s) Rectal daily PRN Constipation  clotrimazole 1% Cream 1 Application(s) Topical two times a day PRN rash  magnesium hydroxide Suspension 30 milliLiter(s) Oral at bedtime PRN for congestion, for constipation  nystatin Powder 1 Application(s) Topical two times a day PRN erythema of sacrum    PHYSICAL EXAM:  GENERAL: in bed, non-verbal, NAD, sleepy but arousable   HEENT:anicteric sclera  CHEST/LUNG: Clear to percussion bilaterally; No rales, rhonchi, wheezing, or rubs.   HEART: Regular rate and rhythm; No murmurs, rubs, or gallops  ABDOMEN: Soft, Nontender, Nondistended; Bowel sounds present.    EXTREMITIES:  contraction of b/l hands. +2 edema feet, no edema of shins.   SKIN: No rashes or lesions  PSYCH: unable to assess  NEURO- pupils reactive Unable to assess further as pt does not follow commands   Consultant(s) Notes Reviewed:  [x ] YES  [ ] NO: Pulmonlogy  Care Discussed with Consultants/Other Providers [ x] YES  [ ] NO    LABS:                                                                 12.6   3.33  )-----------( 148      ( 20 Oct 2018 06:25 )             39.5     10-20    144  |  96<L>  |  23  ----------------------------<  83  4.2   |  35<H>  |  0.67    Ca    9.3      20 Oct 2018 06:25  Mg     1.8     10-20    CAPILLARY BLOOD GLUCOSE          RADIOLOGY & ADDITIONAL TESTS:    Imaging Personally Reviewed:  [ ] YES  [ ] NO

## 2018-10-20 NOTE — CONSULT NOTE ADULT - ATTENDING COMMENTS
patient is seen and examed. Agree with resident's note.  For head CT.    continue current AED.  seizure precautions.    EEG

## 2018-10-20 NOTE — PROGRESS NOTE ADULT - PROBLEM SELECTOR PLAN 6
Hx of LE edema w/ LE US wnl 07/2018 on furosemide  -continue on furosemide, though given mental status should be monitored closely for intravascular depletion.  -outpatient TTE Patient with bradycardia w/ hx of 1st degree on EKG from 5/2018 potentially in setting of sedation.  -continue to monitor on tele  -avoid AV blocking meds  -lipid panel for risk factors, may need statin

## 2018-10-20 NOTE — CONSULT NOTE ADULT - ASSESSMENT
Patient is a 53F with a history of cerebral palsy with spastic quadriplegia and seizure disorder who presented to the ED with constipation. Patient lives in a group facility and is accompanied by an aide at bedside who assists with history. Patient is non-verbal at baseline and wheelchair bound. She does not follow commands but is able to feed herself. Per aide, patient currently appears to be at baseline and is showing no signs of agitation. She has a history of seizures for which she takes keppra, depakote, phenytoin, and carbamazepine. She was in the ICU a few months ago due to status epilepticus. Patient has been more lethargic today and did not want to take her PO meds this morning including her seizure medications. Neurology was consulted for further recommendations.     Patient currently appears to be at neurological baseline and has had no clinical evidence of seizure activity.     Recommendations:  continue seizure medications (keppra, depakote, dilantin, carbamazepine)  routine EEG  ammonia level  can check drug levels   CTH pending

## 2018-10-20 NOTE — PROGRESS NOTE ADULT - PROBLEM SELECTOR PLAN 1
continue with aggressive bowel regimen   s/p Fleet enema    CT abdomen with No bowel obstruction As per family, pt more lethargic this AM  Did not take her PO AED  meds  Will convert to Keppra, Depakote to IV, took dilantin suspension this AM   No PO conversion for Tegretol  DW Neuro - may use Ativan 2mg x 1 for twitching   Will obtain CT head, obtain infectious work up - BC,UC As per family, pt more lethargic this AM  Did not take her PO AED  meds  Will convert to Keppra, Depakote to IV, took dilantin suspension this AM   No PO conversion for Tegretol  DW Neuro - may use Ativan 2mg x 1 for twitching   Will obtain CT head, obtain infectious work up - BC,UC  FU EEG

## 2018-10-20 NOTE — PROGRESS NOTE ADULT - PROBLEM SELECTOR PLAN 3
patient stable, most recent blood gas with compensated chronic respiratory acidosis.    -outpatient sleep study,  -no wheezing on exam, is not fluid overloaded at this time. Patient's aid states that she no longer has her menses - unknown how long.  Pelvic ultrasound -no gross abnormality on limited transabdominal examination.     Gyn called for consult for possible post menopausal bleed. Awaiting GYN for recommendations.

## 2018-10-20 NOTE — PROGRESS NOTE ADULT - PROBLEM SELECTOR PLAN 8
As above Patient quadraplegia and AOx0, nonverbal baseline  -continue home medications  -fall precaution

## 2018-10-20 NOTE — PROGRESS NOTE ADULT - PROBLEM SELECTOR PLAN 7
Patient quadraplegia and AOx0, nonverbal baseline  -continue home medications  -fall precaution Hx of LE edema w/ LE US wnl 07/2018 on furosemide  -continue on furosemide, though given mental status should be monitored closely for intravascular depletion.  -outpatient TTE

## 2018-10-21 LAB
APPEARANCE UR: CLEAR — SIGNIFICANT CHANGE UP
BACTERIA # UR AUTO: NEGATIVE — SIGNIFICANT CHANGE UP
BILIRUB UR-MCNC: NEGATIVE — SIGNIFICANT CHANGE UP
BLOOD UR QL VISUAL: NEGATIVE — SIGNIFICANT CHANGE UP
BUN SERPL-MCNC: 22 MG/DL — SIGNIFICANT CHANGE UP (ref 7–23)
CALCIUM SERPL-MCNC: 9.3 MG/DL — SIGNIFICANT CHANGE UP (ref 8.4–10.5)
CHLORIDE SERPL-SCNC: 99 MMOL/L — SIGNIFICANT CHANGE UP (ref 98–107)
CO2 SERPL-SCNC: 31 MMOL/L — SIGNIFICANT CHANGE UP (ref 22–31)
COLOR SPEC: SIGNIFICANT CHANGE UP
CREAT SERPL-MCNC: 0.68 MG/DL — SIGNIFICANT CHANGE UP (ref 0.5–1.3)
GLUCOSE SERPL-MCNC: 69 MG/DL — LOW (ref 70–99)
GLUCOSE UR-MCNC: NEGATIVE — SIGNIFICANT CHANGE UP
HCT VFR BLD CALC: 19.9 % — CRITICAL LOW (ref 34.5–45)
HCT VFR BLD CALC: 41.5 % — SIGNIFICANT CHANGE UP (ref 34.5–45)
HGB BLD-MCNC: 13.2 G/DL — SIGNIFICANT CHANGE UP (ref 11.5–15.5)
HGB BLD-MCNC: 6 G/DL — CRITICAL LOW (ref 11.5–15.5)
HYALINE CASTS # UR AUTO: NEGATIVE — SIGNIFICANT CHANGE UP
KETONES UR-MCNC: SIGNIFICANT CHANGE UP
LEUKOCYTE ESTERASE UR-ACNC: SIGNIFICANT CHANGE UP
MCHC RBC-ENTMCNC: 30.2 % — LOW (ref 32–36)
MCHC RBC-ENTMCNC: 31.8 % — LOW (ref 32–36)
MCHC RBC-ENTMCNC: 32.7 PG — SIGNIFICANT CHANGE UP (ref 27–34)
MCHC RBC-ENTMCNC: 32.8 PG — SIGNIFICANT CHANGE UP (ref 27–34)
MCV RBC AUTO: 102.7 FL — HIGH (ref 80–100)
MCV RBC AUTO: 108.7 FL — HIGH (ref 80–100)
NITRITE UR-MCNC: NEGATIVE — SIGNIFICANT CHANGE UP
NRBC # FLD: 0.03 — SIGNIFICANT CHANGE UP
NRBC # FLD: 0.03 — SIGNIFICANT CHANGE UP
PH UR: 6 — SIGNIFICANT CHANGE UP (ref 5–8)
PLATELET # BLD AUTO: 154 K/UL — SIGNIFICANT CHANGE UP (ref 150–400)
PLATELET # BLD AUTO: 75 K/UL — LOW (ref 150–400)
PMV BLD: 10.1 FL — SIGNIFICANT CHANGE UP (ref 7–13)
PMV BLD: 10.9 FL — SIGNIFICANT CHANGE UP (ref 7–13)
POTASSIUM SERPL-MCNC: 4.1 MMOL/L — SIGNIFICANT CHANGE UP (ref 3.5–5.3)
POTASSIUM SERPL-SCNC: 4.1 MMOL/L — SIGNIFICANT CHANGE UP (ref 3.5–5.3)
PROT UR-MCNC: NEGATIVE — SIGNIFICANT CHANGE UP
RBC # BLD: 1.83 M/UL — LOW (ref 3.8–5.2)
RBC # BLD: 4.04 M/UL — SIGNIFICANT CHANGE UP (ref 3.8–5.2)
RBC # FLD: 14.5 % — SIGNIFICANT CHANGE UP (ref 10.3–14.5)
RBC # FLD: 14.8 % — HIGH (ref 10.3–14.5)
RBC CASTS # UR COMP ASSIST: SIGNIFICANT CHANGE UP (ref 0–?)
SODIUM SERPL-SCNC: 145 MMOL/L — SIGNIFICANT CHANGE UP (ref 135–145)
SP GR SPEC: 1.01 — SIGNIFICANT CHANGE UP (ref 1–1.04)
SPECIMEN SOURCE: SIGNIFICANT CHANGE UP
SQUAMOUS # UR AUTO: SIGNIFICANT CHANGE UP
UROBILINOGEN FLD QL: NORMAL — SIGNIFICANT CHANGE UP
WBC # BLD: 2.14 K/UL — LOW (ref 3.8–10.5)
WBC # BLD: 4.33 K/UL — SIGNIFICANT CHANGE UP (ref 3.8–10.5)
WBC # FLD AUTO: 2.14 K/UL — LOW (ref 3.8–10.5)
WBC # FLD AUTO: 4.33 K/UL — SIGNIFICANT CHANGE UP (ref 3.8–10.5)
WBC UR QL: SIGNIFICANT CHANGE UP (ref 0–?)

## 2018-10-21 PROCEDURE — 99221 1ST HOSP IP/OBS SF/LOW 40: CPT

## 2018-10-21 PROCEDURE — 70450 CT HEAD/BRAIN W/O DYE: CPT | Mod: 26

## 2018-10-21 PROCEDURE — 95819 EEG AWAKE AND ASLEEP: CPT | Mod: 26

## 2018-10-21 PROCEDURE — 71045 X-RAY EXAM CHEST 1 VIEW: CPT | Mod: 26

## 2018-10-21 PROCEDURE — 99233 SBSQ HOSP IP/OBS HIGH 50: CPT

## 2018-10-21 RX ORDER — SODIUM CHLORIDE 9 MG/ML
1000 INJECTION, SOLUTION INTRAVENOUS
Refills: 0 | Status: DISCONTINUED | OUTPATIENT
Start: 2018-10-21 | End: 2018-10-29

## 2018-10-21 RX ADMIN — LEVETIRACETAM 400 MILLIGRAM(S): 250 TABLET, FILM COATED ORAL at 06:30

## 2018-10-21 RX ADMIN — Medication 0.5 MILLIGRAM(S): at 09:51

## 2018-10-21 RX ADMIN — Medication 27.5 MILLIGRAM(S): at 17:21

## 2018-10-21 RX ADMIN — Medication 1 DROP(S): at 17:20

## 2018-10-21 RX ADMIN — Medication 100 MILLIGRAM(S): at 05:54

## 2018-10-21 RX ADMIN — Medication 1 DROP(S): at 05:47

## 2018-10-21 RX ADMIN — ENOXAPARIN SODIUM 40 MILLIGRAM(S): 100 INJECTION SUBCUTANEOUS at 17:22

## 2018-10-21 RX ADMIN — SODIUM CHLORIDE 75 MILLILITER(S): 9 INJECTION, SOLUTION INTRAVENOUS at 12:01

## 2018-10-21 RX ADMIN — Medication 1 SPRAY(S): at 05:47

## 2018-10-21 RX ADMIN — Medication 100 MILLIGRAM(S): at 05:53

## 2018-10-21 RX ADMIN — LEVETIRACETAM 400 MILLIGRAM(S): 250 TABLET, FILM COATED ORAL at 17:20

## 2018-10-21 RX ADMIN — Medication 1 SPRAY(S): at 17:20

## 2018-10-21 RX ADMIN — Medication 133 MILLILITER(S): at 17:19

## 2018-10-21 RX ADMIN — Medication 27.5 MILLIGRAM(S): at 06:30

## 2018-10-21 NOTE — PROGRESS NOTE ADULT - PROBLEM SELECTOR PLAN 7
Hx of LE edema w/ LE US wnl 07/2018 on furosemide  -continue on furosemide, though given mental status should be monitored closely for intravascular depletion.  -outpatient TTE Hx of LE edema w/ LE US wnl 07/2018 on furosemide  Pt on  furosemide, will hold for now given somnolence and poor PO intake

## 2018-10-21 NOTE — PROGRESS NOTE ADULT - PROBLEM SELECTOR PLAN 1
As per family, pt more lethargic on 10/20 and did not take her PO AED  meds  Keppra, Depakote were converted  to IV, pt taking  dilantin suspension   No PO conversion for Tegretol  ( pt missed 2 doses)   DW Neuro - may use Ativan 2mg x 1 for twitching   FU CT head, obtain infectious work up - BC,UC  FU EEG As per family, pt more lethargic on 10/20 and did not take her PO AED  meds  Keppra, Depakote were converted  to IV, pt taking  dilantin suspension   No PO conversion for Tegretol  ( pt missed 2 doses)   DW Neuro - may use Ativan 2mg x 1 for twitching   FU CT head, obtain infectious work up - BC,UC  FU EEG  Will consider ABG if CT head and EEG not conclusive   will start gentle hydration as pt with decreased PO intake

## 2018-10-21 NOTE — PROGRESS NOTE ADULT - PROBLEM SELECTOR PLAN 5
Patient on several antiepileptics due to recent MICU for status epilecticus. No signs of seizure activity  -continue home divalproex, phenytoin, levetiracetam, carbamazepine ( missed 2 doses of carbamazepine)   -seizure precautions

## 2018-10-21 NOTE — PROGRESS NOTE ADULT - PROBLEM SELECTOR PLAN 3
Patient's aid states that she no longer has her menses - unknown how long.  Pelvic ultrasound -no gross abnormality on limited transabdominal examination.   Gyn called for consult for possible post menopausal bleed.   FU  GYN for recommendations.

## 2018-10-21 NOTE — PROGRESS NOTE ADULT - PROBLEM SELECTOR PLAN 2
Improving   Pt with medium sized BM today   continue with aggressive bowel regimen   s/p  Fleet enema   yesterday , will order another fleet today as pt with decreased PO intake   CT abdomen with No bowel obstruction Improving   Pt with medium sized BM today   continue with bowel regimen   s/p  Fleet enema   CT abdomen with No bowel obstruction

## 2018-10-21 NOTE — CONSULT NOTE ADULT - ASSESSMENT
54yo F w/ PMH of severe cerebral palsy  (a/w severe ID, baseline non verbal, does not ambulate, however can feed herself, follows some commands), intractable seizures and functional quadriplegia who lives in group housing, was admitted 10/17/18 with wheezing, constipation and LE edema was found to have VB after admission which has since resolved.  Exam with [facility] OBGYN Service attending of the external genitalia showed no bleeding and no signs of trauma.  Any further exam was deferred due to concerns about consent and emotional trauma to the patient.  Additionally her H/H trend has been 39.7 -> 39.4 -> 39.8 -> 39.5 -> 41.5.  She was unable to tolerate a TVUS however TAUS was reassuring.   It is unclear whether the patient is postmenopausal since reports from her HCP and her caregivers at her group home seem to be in conflict.  Differential diagnosis includes normal mensturation vs abnormal uterine bleeding vs postmenopausal bleeding.  Given that any postmenopausal bleeding may be presumed to be endometrial cancer until proven otherwise, it is imperative that the patient receive adequate outpatient workup of her condition.  Per the patient's caregivers, she has a private GYN who sees her yearly.      RECOMMENDATIONS:   - Patient needs outpatient follow up to ascertain diagnosis and appropriate treatment   - Patient will likely need examination under anesthesia to facilitate diagnosis   - Patient may follow up in 2-4 weeks with her private GYN or she may establish care with [facility] if this is preferred by her caretakers.  If the latter option is desired, instructions may be given for her caretakers to call our clinic at [phone] to request appointment.      Patient seen and examined with Dr. Jaycee Rodrigez, service attending  ELODIA Metcalf MD PGY2

## 2018-10-21 NOTE — PROGRESS NOTE ADULT - SUBJECTIVE AND OBJECTIVE BOX
Patient is a 53y old  Female who presents with a chief complaint of Obstipation (18 Oct 2018 11:38)      INTERVAL HPI/OVERNIGHT EVENTS: Pt seen and examined by me Aide and family  at bedside   pt was more lethargic, more sleepy with decreased Po intake on 10/20  Pt had medium sized BM on 10/20      Vital Signs Last 24 Hrs  T(C): 36.2 (21 Oct 2018 05:31), Max: 36.3 (20 Oct 2018 21:56)  T(F): 97.2 (21 Oct 2018 05:31), Max: 97.3 (20 Oct 2018 21:56)  HR: 87 (21 Oct 2018 05:31) (78 - 87)  BP: 120/86 (21 Oct 2018 05:31) (103/66 - 124/89)  BP(mean): 79 (20 Oct 2018 21:56) (79 - 79)  RR: 18 (21 Oct 2018 05:31) (18 - 18)  SpO2: 96% (21 Oct 2018 05:31) (94% - 97%)      MEDICATIONS  (STANDING):  artificial tears (preservative free) Ophthalmic Solution 1 Drop(s) Both EYES two times a day  aspirin enteric coated 81 milliGRAM(s) Oral daily  benztropine 0.5 milliGRAM(s) Oral two times a day  buDESOnide   0.5 milliGRAM(s) Respule 0.5 milliGRAM(s) Inhalation daily  carBAMazepine XR Tablet 400 milliGRAM(s) Oral two times a day  carBAMazepine XR Tablet 100 milliGRAM(s) Oral daily  docusate sodium Liquid 100 milliGRAM(s) Oral two times a day  enoxaparin Injectable 40 milliGRAM(s) SubCutaneous every 24 hours  fluticasone propionate 50 MICROgram(s)/spray Nasal Spray 1 Spray(s) Both Nostrils two times a day  folic acid 1 milliGRAM(s) Oral daily  furosemide    Tablet 40 milliGRAM(s) Oral daily  levETIRAcetam  IVPB 1000 milliGRAM(s) IV Intermittent every 12 hours  mineral oil enema 133 milliLiter(s) Rectal once  multivitamin 1 Tablet(s) Oral daily  OLANZapine 7.5 milliGRAM(s) Oral daily  pantoprazole    Tablet 40 milliGRAM(s) Oral before breakfast  phenytoin   Suspension 300 milliGRAM(s) Oral at bedtime  phenytoin   Suspension 100 milliGRAM(s) Oral before breakfast  polyethylene glycol 3350 17 Gram(s) Oral two times a day  senna 2 Tablet(s) Oral at bedtime  sodium chloride 0.9%. 1000 milliLiter(s) (75 mL/Hr) IV Continuous <Continuous>  thiamine 100 milliGRAM(s) Oral daily  valproate sodium IVPB 500 milliGRAM(s) IV Intermittent two times a day    MEDICATIONS  (PRN):  AQUAPHOR (petrolatum Ointment) 1 Application(s) Topical two times a day PRN sacrum rash  bisacodyl Suppository 10 milliGRAM(s) Rectal daily PRN Constipation  clotrimazole 1% Cream 1 Application(s) Topical two times a day PRN rash  magnesium hydroxide Suspension 30 milliLiter(s) Oral at bedtime PRN for congestion, for constipation  nystatin Powder 1 Application(s) Topical two times a day PRN erythema of sacrum    PHYSICAL EXAM:  GENERAL: in bed, non-verbal, NAD, sleepy but arousable   HEENT:anicteric sclera  CHEST/LUNG: Clear to percussion bilaterally; No rales, rhonchi, wheezing, or rubs.   HEART: Regular rate and rhythm; No murmurs, rubs, or gallops  ABDOMEN: Soft, Nontender, Nondistended; Bowel sounds present.    EXTREMITIES:  contraction of b/l hands. +2 edema feet, no edema of shins.   SKIN: No rashes or lesions  PSYCH: unable to assess  NEURO- pupils reactive Unable to assess further as pt does not follow commands   Consultant(s) Notes Reviewed:  [x ] YES  [ ] NO: Pulmonlogy  Care Discussed with Consultants/Other Providers [ x] YES  [ ] NO    LABS:                                            12.6   3.33  )-----------( 148      ( 20 Oct 2018 06:25 )             39.5     10-20    144  |  96<L>  |  23  ----------------------------<  83  4.2   |  35<H>  |  0.67    Ca    9.3      20 Oct 2018 06:25  Mg     1.8     10-20      RADIOLOGY & ADDITIONAL TESTS:    Imaging Personally Reviewed:  [ ] YES  [ ] NO Patient is a 53y old  Female who presents with a chief complaint of Obstipation (18 Oct 2018 11:38)      INTERVAL HPI/OVERNIGHT EVENTS: Pt seen and examined by me Aide and family  at bedside   pt was more lethargic,  twitching and  decreased Po intake and refusal to take PO AED  on 10/20  Keppra and Depakote were converted to IV form   As per nurse, twitching decreased with the meds but pt still with decreased PO intake   Pt had medium sized BM on 10/20      Vital Signs Last 24 Hrs  T(C): 36.2 (21 Oct 2018 05:31), Max: 36.3 (20 Oct 2018 21:56)  T(F): 97.2 (21 Oct 2018 05:31), Max: 97.3 (20 Oct 2018 21:56)  HR: 87 (21 Oct 2018 05:31) (78 - 87)  BP: 120/86 (21 Oct 2018 05:31) (103/66 - 124/89)  BP(mean): 79 (20 Oct 2018 21:56) (79 - 79)  RR: 18 (21 Oct 2018 05:31) (18 - 18)  SpO2: 96% (21 Oct 2018 05:31) (94% - 96%)    MEDICATIONS  (STANDING):  artificial tears (preservative free) Ophthalmic Solution 1 Drop(s) Both EYES two times a day  aspirin enteric coated 81 milliGRAM(s) Oral daily  benztropine 0.5 milliGRAM(s) Oral two times a day  buDESOnide   0.5 milliGRAM(s) Respule 0.5 milliGRAM(s) Inhalation daily  carBAMazepine XR Tablet 400 milliGRAM(s) Oral two times a day  carBAMazepine XR Tablet 100 milliGRAM(s) Oral daily  docusate sodium Liquid 100 milliGRAM(s) Oral two times a day  enoxaparin Injectable 40 milliGRAM(s) SubCutaneous every 24 hours  fluticasone propionate 50 MICROgram(s)/spray Nasal Spray 1 Spray(s) Both Nostrils two times a day  folic acid 1 milliGRAM(s) Oral daily  furosemide    Tablet 40 milliGRAM(s) Oral daily  levETIRAcetam  IVPB 1000 milliGRAM(s) IV Intermittent every 12 hours  mineral oil enema 133 milliLiter(s) Rectal once  multivitamin 1 Tablet(s) Oral daily  OLANZapine 7.5 milliGRAM(s) Oral daily  pantoprazole    Tablet 40 milliGRAM(s) Oral before breakfast  phenytoin   Suspension 300 milliGRAM(s) Oral at bedtime  phenytoin   Suspension 100 milliGRAM(s) Oral before breakfast  polyethylene glycol 3350 17 Gram(s) Oral two times a day  senna 2 Tablet(s) Oral at bedtime  sodium chloride 0.9%. 1000 milliLiter(s) (75 mL/Hr) IV Continuous <Continuous>  thiamine 100 milliGRAM(s) Oral daily  valproate sodium IVPB 500 milliGRAM(s) IV Intermittent two times a day    MEDICATIONS  (PRN):  AQUAPHOR (petrolatum Ointment) 1 Application(s) Topical two times a day PRN sacrum rash  bisacodyl Suppository 10 milliGRAM(s) Rectal daily PRN Constipation  clotrimazole 1% Cream 1 Application(s) Topical two times a day PRN rash  magnesium hydroxide Suspension 30 milliLiter(s) Oral at bedtime PRN for congestion, for constipation  nystatin Powder 1 Application(s) Topical two times a day PRN erythema of sacrum    PHYSICAL EXAM:  GENERAL: in bed, non-verbal, NAD, sleepy but arousable   HEENT:anicteric sclera  CHEST/LUNG: Clear to percussion bilaterally; No rales, rhonchi, wheezing, or rubs.   HEART: Regular rate and rhythm; No murmurs, rubs, or gallops  ABDOMEN: Soft, Nontender, Nondistended; Bowel sounds present.    EXTREMITIES:  contraction of b/l hands. +2 edema feet, no edema of shins.   SKIN: No rashes or lesions  PSYCH: unable to assess  NEURO- pupils reactive Unable to assess further as pt does not follow commands   Consultant(s) Notes Reviewed:  [x ] YES  [ ] NO: Pulmonlogy  Care Discussed with Consultants/Other Providers [ x] YES  [ ] NO    LABS:                                                                 13.2   4.33  )-----------( 154      ( 21 Oct 2018 09:50 )             41.5     10-21    145  |  99  |  22  ----------------------------<  69<L>  4.1   |  31  |  0.68    Ca    9.3      21 Oct 2018 09:50  Mg     1.8     10-20      RADIOLOGY & ADDITIONAL TESTS:    Imaging Personally Reviewed:  [ ] YES  [ ] NO

## 2018-10-21 NOTE — CONSULT NOTE ADULT - SUBJECTIVE AND OBJECTIVE BOX
R2 GYN INPATIENT CONSULT NOTE    SUBJECTIVE:    54yo F w/ PMH of     Pt denies fever, chills, nausea, vomiting, diarrhea, headache, constipation, dizzyness, syncope, chest pain, palpitations, shortness of breath, dysuria, urgency, frequency, abdominal/pelvic pain, vaginal bleeding/discharge/odor/pain/itching, breast lumps/bumps, dyspareunia.    In ED today    Primary OB/GYN:  OBH:  GYNH: denies hx of fibroids, ov cysts, abnl paps, sti  PMSH:  MEDS: none  ALL: nkda  SOCH: denies t/e/d; safe at home  FAMH: denies fam hx of breast/uterine/ovarian/colon cancer  ROS: negative except per hpi    OBJECTIVE:    VITAL SIGNS:  Vital Signs Last 24 Hrs  T(C): 36.2 (21 Oct 2018 05:31), Max: 36.3 (20 Oct 2018 21:56)  T(F): 97.2 (21 Oct 2018 05:31), Max: 97.3 (20 Oct 2018 21:56)  HR: 87 (21 Oct 2018 05:31) (78 - 87)  BP: 120/86 (21 Oct 2018 05:31) (103/66 - 124/89)  BP(mean): 79 (20 Oct 2018 21:56) (79 - 79)  RR: 18 (21 Oct 2018 05:31) (18 - 18)  SpO2: 96% (21 Oct 2018 05:31) (94% - 97%)    CAPILLARY BLOOD GLUCOSE          10-20-18 @ 07:01  -  10-21-18 @ 07:00  --------------------------------------------------------  IN: 0 mL / OUT: 350 mL / NET: -350 mL        PHYSICAL EXAM:  GEN: NAD, A&Ox3  CV: RRR, no m/g/r  LUNGS: CTAB  ABD: soft, NT, ND, +BS  SPECULUM:  PELVIC:  EXT: WWP, no edema/TTP    LABS:                        12.6   3.33  )-----------( 148      ( 20 Oct 2018 06:25 )             39.5   baso x      eos x      imm gran x      lymph x      mono x      poly x                            12.7   4.24  )-----------( 159      ( 19 Oct 2018 07:00 )             39.8   baso x      eos x      imm gran x      lymph x      mono x      poly x          10-20    144  |  96<L>  |  23  ----------------------------<  83  4.2   |  35<H>  |  0.67    Ca    9.3      20 Oct 2018 06:25  Mg     1.8     10-20            RADIOLOGY:    ASSESSMENT:    RECOMMENDATIONS: R2 GYN INPATIENT CONSULT NOTE    SUBJECTIVE:    54yo F w/ PMH of severe cerebral palsy  (a/w severe ID, baseline non verbal, does not ambulate, however can feed herself, follows some commands), intractable seizures and functional quadriplegia who lives in group housing, was admitted 10/17/18 with wheezing, constipation and LE edema was found to have VB after admission.  Per EMR the patient was noted to have VB on HD#2.  At this time per the PA report, the pt had 3-4 pads/day of VB.  She had been started on lovenox on admission.  GYN consult was made on HD#3.  At this time the pt's emergency contact was called who referred us to her current caregiver at the group home, "Kraig" who indicated that the patient was postmenopausal and no longer    Pt denies fever, chills, nausea, vomiting, diarrhea, headache, constipation, dizzyness, syncope, chest pain, palpitations, shortness of breath, dysuria, urgency, frequency, abdominal/pelvic pain, vaginal bleeding/discharge/odor/pain/itching, breast lumps/bumps, dyspareunia.    In ED today    Primary OB/GYN:  OBH:  GYNH: denies hx of fibroids, ov cysts, abnl paps, sti  PMSH:  MEDS: none  ALL: nkda  SOCH: denies t/e/d; safe at home  FAMH: denies fam hx of breast/uterine/ovarian/colon cancer  ROS: negative except per hpi    OBJECTIVE:    VITAL SIGNS:  Vital Signs Last 24 Hrs  T(C): 36.2 (21 Oct 2018 05:31), Max: 36.3 (20 Oct 2018 21:56)  T(F): 97.2 (21 Oct 2018 05:31), Max: 97.3 (20 Oct 2018 21:56)  HR: 87 (21 Oct 2018 05:31) (78 - 87)  BP: 120/86 (21 Oct 2018 05:31) (103/66 - 124/89)  BP(mean): 79 (20 Oct 2018 21:56) (79 - 79)  RR: 18 (21 Oct 2018 05:31) (18 - 18)  SpO2: 96% (21 Oct 2018 05:31) (94% - 97%)    CAPILLARY BLOOD GLUCOSE          10-20-18 @ 07:01  -  10-21-18 @ 07:00  --------------------------------------------------------  IN: 0 mL / OUT: 350 mL / NET: -350 mL        PHYSICAL EXAM:  GEN: NAD, A&Ox3  CV: RRR, no m/g/r  LUNGS: CTAB  ABD: soft, NT, ND, +BS  SPECULUM:  PELVIC:  EXT: WWP, no edema/TTP    LABS:                        12.6   3.33  )-----------( 148      ( 20 Oct 2018 06:25 )             39.5   baso x      eos x      imm gran x      lymph x      mono x      poly x                            12.7   4.24  )-----------( 159      ( 19 Oct 2018 07:00 )             39.8   baso x      eos x      imm gran x      lymph x      mono x      poly x          10-20    144  |  96<L>  |  23  ----------------------------<  83  4.2   |  35<H>  |  0.67    Ca    9.3      20 Oct 2018 06:25  Mg     1.8     10-20            RADIOLOGY:    ASSESSMENT:    RECOMMENDATIONS: R2 GYN INPATIENT CONSULT NOTE    SUBJECTIVE:    52yo F w/ PMH of severe cerebral palsy  (a/w severe ID, baseline non verbal, does not ambulate, however can feed herself, follows some commands), intractable seizures and functional quadriplegia who lives in group housing, was admitted 10/17/18 with wheezing, constipation and LE edema was found to have VB after admission.  Per EMR the patient was noted to have VB on HD#2.  At this time per the PA report, the pt had 3-4 pads/day of VB.  She had been started on lovenox on admission.  GYN consult was made on HD#3.  At this time the pt's emergency contact was called who referred us to her current caregiver at the group home, "Kraig" who indicated that the patient was postmenopausal.      Per the RN taking care of the pt on HD#3 the patient had no VB on HD#3; she also reports that the patient was having active seizures on HD#3 which resolved after she received her antiepileptic meds.  UA was clean in the ED.  CTA/P in the ED showed a fibroid uterus w/ no other visible GYN abnormalities.  The patient was unable to tolerate TVUS but TAUS on HD#3 showed 4mm EM lining and 7cm uterus.  At the time of bedside evaluation by GYN on the morning of HD#4 the pt's caregiver was not at bedside and the patient was unable to consent to GYN exam.  Per the RN at bedside there was no VB on the maicol throughout the night or this morning.  RN was isntructed to page GYN once the pt's caregiver/HCP arrived at bedside.    Primary OB/GYN: unknown  OBH: unknown  GYNH: unknown  PMSH: cerebral palsy, severe ID, seizure d/o, functional quadriplegia  MEDS: calcium,   ALL: topamax  SOCH: denies t/e/d; safe at home  FAMH: denies fam hx of breast/uterine/ovarian/colon cancer  ROS: negative except per hpi    OBJECTIVE:    VITAL SIGNS:  Vital Signs Last 24 Hrs  T(C): 36.2 (21 Oct 2018 05:31), Max: 36.3 (20 Oct 2018 21:56)  T(F): 97.2 (21 Oct 2018 05:31), Max: 97.3 (20 Oct 2018 21:56)  HR: 87 (21 Oct 2018 05:31) (78 - 87)  BP: 120/86 (21 Oct 2018 05:31) (103/66 - 124/89)  BP(mean): 79 (20 Oct 2018 21:56) (79 - 79)  RR: 18 (21 Oct 2018 05:31) (18 - 18)  SpO2: 96% (21 Oct 2018 05:31) (94% - 97%)    CAPILLARY BLOOD GLUCOSE          10-20-18 @ 07:01  -  10-21-18 @ 07:00  --------------------------------------------------------  IN: 0 mL / OUT: 350 mL / NET: -350 mL        PHYSICAL EXAM:  GEN: NAD, A&Ox3  CV: RRR, no m/g/r  LUNGS: CTAB  ABD: soft, NT, ND, +BS  SPECULUM:  PELVIC:  EXT: WWP, no edema/TTP    LABS:                        12.6   3.33  )-----------( 148      ( 20 Oct 2018 06:25 )             39.5   baso x      eos x      imm gran x      lymph x      mono x      poly x                            12.7   4.24  )-----------( 159      ( 19 Oct 2018 07:00 )             39.8   baso x      eos x      imm gran x      lymph x      mono x      poly x          10-20    144  |  96<L>  |  23  ----------------------------<  83  4.2   |  35<H>  |  0.67    Ca    9.3      20 Oct 2018 06:25  Mg     1.8     10-20            RADIOLOGY:    ASSESSMENT:    RECOMMENDATIONS: R2 GYN INPATIENT CONSULT NOTE    SUBJECTIVE:    54yo F w/ PMH of severe cerebral palsy  (a/w severe ID, baseline non verbal, does not ambulate, however can feed herself, follows some commands), intractable seizures and functional quadriplegia who lives in group housing, was admitted 10/17/18 with wheezing, constipation and LE edema was found to have VB after admission.  Per EMR the patient was noted to have VB on HD#2.  At this time per the PA report, the pt had 3-4 pads/day of VB.  She had been started on lovenox on admission.  GYN consult was made on HD#3.  At this time the pt's emergency contact was called who referred us to her current caregiver at the group home, "Kraig" who indicated that the patient was postmenopausal.      Per the RN taking care of the pt on HD#3 the patient had no VB on HD#3; she also reports that the patient was having active seizures on HD#3 which resolved after she received her antiepileptic meds.  UA was clean in the ED.  CTA/P in the ED showed a fibroid uterus w/ no other visible GYN abnormalities.  The patient was unable to tolerate TVUS but TAUS on HD#3 showed 4mm EM lining and 7cm uterus.  At the time of bedside evaluation by GYN on the morning of HD#4 the pt's caregiver was not at bedside and the patient was unable to consent to GYN exam.  Per the RN at bedside there was no VB on the maicol throughout the night or this morning.  RN was isntructed to page GYN once the pt's caregiver/HCP arrived at bedside, however, as of 4pm there was still no one available.  Multiple attempts were made to communicate with the patient's group home caretakers; according to the pt's CM Ms. Elo Peters ([phone]), the patient does not menstruate at this time.      Primary OB/GYN: unknown  OBH: unknown  GYNH: unknown  PMSH: cerebral palsy, severe ID, seizure d/o, functional quadriplegia  MEDS: calcium,   ALL: topamax  SOCH: denies t/e/d; safe at home  FAMH: denies fam hx of breast/uterine/ovarian/colon cancer  ROS: negative except per hpi    OBJECTIVE:    VITAL SIGNS:  Vital Signs Last 24 Hrs  T(C): 36.2 (21 Oct 2018 05:31), Max: 36.3 (20 Oct 2018 21:56)  T(F): 97.2 (21 Oct 2018 05:31), Max: 97.3 (20 Oct 2018 21:56)  HR: 87 (21 Oct 2018 05:31) (78 - 87)  BP: 120/86 (21 Oct 2018 05:31) (103/66 - 124/89)  BP(mean): 79 (20 Oct 2018 21:56) (79 - 79)  RR: 18 (21 Oct 2018 05:31) (18 - 18)  SpO2: 96% (21 Oct 2018 05:31) (94% - 97%)    CAPILLARY BLOOD GLUCOSE          10-20-18 @ 07:01  -  10-21-18 @ 07:00  --------------------------------------------------------  IN: 0 mL / OUT: 350 mL / NET: -350 mL        PHYSICAL EXAM:  GEN: NAD, A&Ox3  CV: RRR, no m/g/r  LUNGS: CTAB  ABD: soft, NT, ND, +BS  SPECULUM:  PELVIC:  EXT: WWP, no edema/TTP    LABS:                        12.6   3.33  )-----------( 148      ( 20 Oct 2018 06:25 )             39.5   baso x      eos x      imm gran x      lymph x      mono x      poly x                            12.7   4.24  )-----------( 159      ( 19 Oct 2018 07:00 )             39.8   baso x      eos x      imm gran x      lymph x      mono x      poly x          10-20    144  |  96<L>  |  23  ----------------------------<  83  4.2   |  35<H>  |  0.67    Ca    9.3      20 Oct 2018 06:25  Mg     1.8     10-20            RADIOLOGY:    ASSESSMENT:    RECOMMENDATIONS: R2 GYN INPATIENT CONSULT NOTE    SUBJECTIVE:    52yo F w/ PMH of severe cerebral palsy  (a/w severe ID, baseline non verbal, does not ambulate, however can feed herself, follows some commands), intractable seizures and functional quadriplegia who lives in group housing, was admitted 10/17/18 with wheezing, constipation and LE edema was found to have VB after admission.  Per EMR the patient was noted to have VB on HD#2.  At this time per the PA report, the pt had 3-4 pads/day of VB.  She had been started on lovenox on admission.  GYN consult was made on HD#3.  At this time the pt's emergency contact was called who referred us to her current caregiver at the group home, "Kraig" who indicated that the patient was postmenopausal.      Per the RN taking care of the pt on HD#3 the patient had no VB on HD#3; she also reports that the patient was having active seizures on HD#3 which resolved after she received her antiepileptic meds.  UA was clean in the ED.  CTA/P in the ED showed a fibroid uterus w/ no other visible GYN abnormalities.  The patient was unable to tolerate TVUS but TAUS on HD#3 showed 4mm EM lining and 7cm uterus.  At the time of bedside evaluation by GYN on the morning of HD#4 the pt's caregiver was not at bedside and the patient was unable to consent to GYN exam.  Per the RN at bedside there was no VB on the maicol throughout the night or this morning.  RN was isntructed to page GYN once the pt's caregiver/HCP arrived at bedside, however, as of 4pm there was still no one available.  Multiple attempts were made to communicate with the patient's group home caretakers; according to the pt's CM Ms. Elo Peters ([phone]),the patient does not menstruate.  Additionally, she says the patient has a private GYN (though she couldn't recall the name) who sees her yearly, she has had a pap within the last year, and she has no known OB/GYN history.      Primary OB/GYN: unknown  OBH: unknown  GYNH: unknown  PMSH: cerebral palsy, severe ID, seizure d/o, functional quadriplegia  ALL: topamax  FAMH: per the patient's mother, there is no known hx of breast/uterine/ovarian/colon cancer  ROS: negative except per hpi    OBJECTIVE:    Vital Signs Last 24 Hrs  T(C): 36.2 (21 Oct 2018 05:31), Max: 36.3 (20 Oct 2018 21:56)  T(F): 97.2 (21 Oct 2018 05:31), Max: 97.3 (20 Oct 2018 21:56)  HR: 87 (21 Oct 2018 05:31) (78 - 87)  BP: 120/86 (21 Oct 2018 05:31) (103/66 - 124/89)  BP(mean): 79 (20 Oct 2018 21:56) (79 - 79)  RR: 18 (21 Oct 2018 05:31) (18 - 18)  SpO2: 96% (21 Oct 2018 05:31) (94% - 97%)    10-20-18 @ 07:01  -  10-21-18 @ 07:00  --------------------------------------------------------  IN: 0 mL / OUT: 350 mL / NET: -350 mL    PHYSICAL EXAM:  GEN: NAD, A&Ox3  PELVIC: inspection of the chux and the external genitalia revealed no blood or abnormal discharge and no signs of trauma  EXT: contractures of the b/l lower and upper extremities limiting patient movement    LABS:                        12.6   3.33  )-----------( 148      ( 20 Oct 2018 06:25 )             39.5                           12.7   4.24  )-----------( 159      ( 19 Oct 2018 07:00 )             39.8     10-20    144  |  96<L>  |  23  ----------------------------<  83  4.2   |  35<H>  |  0.67    Ca    9.3      20 Oct 2018 06:25  Mg     1.8     10-20    RADIOLOGY:    < from: US Pelvis Complete (10.19.18 @ 10:48) >  EXAM:  US PELVIC COMPLETE    PROCEDURE DATE:  Oct 19 2018   INTERPRETATION:  CLINICAL INFORMATION: 53-year-old postmenopausal female, mentally challenged. Postmenopausal bleeding.  COMPARISON: CT scan 10/17/2018  TECHNIQUE: Transabdominal pelvic sonogram only.      FINDINGS:  Uterus: 7.7 x 3.5 x 4.1 cm cm. Within normal limits.  Endometrium: 4 mm. Within normal limits.  The ovaries were not visualized. Fluid: None.  There is no gross abnormality on limited transabdominal examination.   Further evaluation with MRI of the pelvis may be considered for continued symptomatology.  IMPRESSION:  Limited examination. No evident uterine abnormality. Consider MRI of the pelvis.    JOSE BURNS M.D., ATTENDING RADIOLOGIST  This document has been electronically signed. Oct 19 2018 11:01AM  < end of copied text >    < from: CT Abdomen and Pelvis w/ IV Cont (10.17.18 @ 13:32) >  EXAM:  CT ABDOMEN AND PELVIS IC   PROCEDURE DATE:  Oct 17 2018   INTERPRETATION:  CLINICAL INFORMATION: Abdominal distention.  COMPARISON: CT chest 5/9/2018 and CT abdomen and pelvis 3/30/2018.  PROCEDURE: CT of the Abdomen and Pelvis was performed with intravenous contrast. Intravenous contrast: 90 ml Omnipaque 350. 10 ml discarded. Oral contrast: None. Sagittal and coronal reformats were performed.  FINDINGS:  LOWER CHEST: Scattered tree-in-bud opacities in the right lung with left lower lobe atelectasis.  LIVER: Within normal limits.  BILE DUCTS: Normal caliber.  GALLBLADDER: Within normal limits.  SPLEEN: Within normal limits.  PANCREAS: Within normal limits.  ADRENALS: Within normal limits.  KIDNEYS/URETERS: No hydronephrosis.  BLADDER: Within normal limits.  REPRODUCTIVE ORGANS: Fibroid uterus. Unremarkable CT appearance of the adnexa.  BOWEL: High density within the stomach likely related to ingested material. No bowel obstruction. Normal appendix.  PERITONEUM: No ascites.  VESSELS:  Normal caliber abdominal aorta.  RETROPERITONEUM: No lymphadenopathy.    ABDOMINAL WALL: Mild subcutaneous edema.  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 Degenerative changesof the spine.  IMPRESSION:   No bowel obstruction. Normal appendix.  Fibroid uterus.    YARY PUCKETT M.D., ATTENDING RADIOLOGIST  This document has been electronically signed. Oct 17 2018  1:54PM    < end of copied text >

## 2018-10-21 NOTE — EEG REPORT - NS EEG TEXT BOX
Study Date: 10/21/2018  --------------------------------------------------------------------------------------------------  History:  Concern for Seizures  --------------------------------------------------------------------------------------------------  Study Interpretation:    FINDINGS:  The background was continuous, variable and reactive. It consisted of continuous irregular theta/delta frequencies without clear sleep states noted.    Epileptiform Activity:   No epileptiform discharges were present.    Events:  No clinical events were recorded.  No seizures were recorded.    Activation Procedures:   -Hyperventilation was not performed.    -Photic stimulation was not performed.    Artifacts:  Intermittent myogenic and movement artifacts were noted.    ECG:  The heart rate on single channel ECG was predominantly between 60-70 BPM.  -----------------------------------------------------------------------------------------------------    EEG Classification / Summary:  Abnormal EEG   -Continuous slowing, generalized (delta and theta)     Clinical Impression:  Moderate nonspecific diffuse or multifocal cerebral dysfunction. There were no epileptiform abnormalities recorded.

## 2018-10-22 LAB
BUN SERPL-MCNC: 20 MG/DL — SIGNIFICANT CHANGE UP (ref 7–23)
CALCIUM SERPL-MCNC: 9 MG/DL — SIGNIFICANT CHANGE UP (ref 8.4–10.5)
CHLORIDE SERPL-SCNC: 103 MMOL/L — SIGNIFICANT CHANGE UP (ref 98–107)
CO2 SERPL-SCNC: 32 MMOL/L — HIGH (ref 22–31)
CREAT SERPL-MCNC: 0.56 MG/DL — SIGNIFICANT CHANGE UP (ref 0.5–1.3)
GLUCOSE SERPL-MCNC: 106 MG/DL — HIGH (ref 70–99)
HCT VFR BLD CALC: 40.1 % — SIGNIFICANT CHANGE UP (ref 34.5–45)
HGB BLD-MCNC: 12.6 G/DL — SIGNIFICANT CHANGE UP (ref 11.5–15.5)
MAGNESIUM SERPL-MCNC: 1.6 MG/DL — SIGNIFICANT CHANGE UP (ref 1.6–2.6)
MCHC RBC-ENTMCNC: 31.4 % — LOW (ref 32–36)
MCHC RBC-ENTMCNC: 32.3 PG — SIGNIFICANT CHANGE UP (ref 27–34)
MCV RBC AUTO: 102.8 FL — HIGH (ref 80–100)
NRBC # FLD: 0.02 — SIGNIFICANT CHANGE UP
PLATELET # BLD AUTO: 121 K/UL — LOW (ref 150–400)
PMV BLD: 10.8 FL — SIGNIFICANT CHANGE UP (ref 7–13)
POTASSIUM SERPL-MCNC: 3.7 MMOL/L — SIGNIFICANT CHANGE UP (ref 3.5–5.3)
POTASSIUM SERPL-SCNC: 3.7 MMOL/L — SIGNIFICANT CHANGE UP (ref 3.5–5.3)
RBC # BLD: 3.9 M/UL — SIGNIFICANT CHANGE UP (ref 3.8–5.2)
RBC # FLD: 14.4 % — SIGNIFICANT CHANGE UP (ref 10.3–14.5)
SODIUM SERPL-SCNC: 146 MMOL/L — HIGH (ref 135–145)
WBC # BLD: 3.75 K/UL — LOW (ref 3.8–10.5)
WBC # FLD AUTO: 3.75 K/UL — LOW (ref 3.8–10.5)

## 2018-10-22 PROCEDURE — 99233 SBSQ HOSP IP/OBS HIGH 50: CPT

## 2018-10-22 RX ORDER — LACTULOSE 10 G/15ML
10 SOLUTION ORAL DAILY
Refills: 0 | Status: DISCONTINUED | OUTPATIENT
Start: 2018-10-22 | End: 2018-10-29

## 2018-10-22 RX ADMIN — Medication 300 MILLIGRAM(S): at 23:48

## 2018-10-22 RX ADMIN — SENNA PLUS 2 TABLET(S): 8.6 TABLET ORAL at 23:49

## 2018-10-22 RX ADMIN — Medication 0.5 MILLIGRAM(S): at 10:45

## 2018-10-22 RX ADMIN — ENOXAPARIN SODIUM 40 MILLIGRAM(S): 100 INJECTION SUBCUTANEOUS at 17:59

## 2018-10-22 RX ADMIN — POLYETHYLENE GLYCOL 3350 17 GRAM(S): 17 POWDER, FOR SOLUTION ORAL at 18:00

## 2018-10-22 RX ADMIN — Medication 100 MILLIGRAM(S): at 17:59

## 2018-10-22 RX ADMIN — Medication 0.5 MILLIGRAM(S): at 17:59

## 2018-10-22 RX ADMIN — Medication 1 SPRAY(S): at 05:29

## 2018-10-22 RX ADMIN — Medication 1 DROP(S): at 17:59

## 2018-10-22 RX ADMIN — Medication 27.5 MILLIGRAM(S): at 18:00

## 2018-10-22 RX ADMIN — LEVETIRACETAM 400 MILLIGRAM(S): 250 TABLET, FILM COATED ORAL at 18:00

## 2018-10-22 RX ADMIN — SODIUM CHLORIDE 75 MILLILITER(S): 9 INJECTION, SOLUTION INTRAVENOUS at 04:33

## 2018-10-22 RX ADMIN — Medication 27.5 MILLIGRAM(S): at 05:54

## 2018-10-22 RX ADMIN — Medication 1 DROP(S): at 05:27

## 2018-10-22 RX ADMIN — Medication 1 SPRAY(S): at 17:59

## 2018-10-22 RX ADMIN — LEVETIRACETAM 400 MILLIGRAM(S): 250 TABLET, FILM COATED ORAL at 05:30

## 2018-10-22 RX ADMIN — Medication 400 MILLIGRAM(S): at 17:59

## 2018-10-22 NOTE — PROGRESS NOTE ADULT - PROBLEM SELECTOR PLAN 3
Improving   Pt with medium sized BM today   continue with bowel regimen   s/p  Fleet enema   CT abdomen with No bowel obstruction

## 2018-10-22 NOTE — PROGRESS NOTE ADULT - SUBJECTIVE AND OBJECTIVE BOX
CC: F/U for lethargy    SUBJECTIVE / OVERNIGHT EVENTS:  Low grade temp overnight. Patient was sleeping.  Arousable and acknowledged my presence but difficult to keep her focused.  Unable to make her needs known but no overnight chills, N/V, SOB, Cough, diarrhea.    MEDICATIONS  (STANDING):  artificial tears (preservative free) Ophthalmic Solution 1 Drop(s) Both EYES two times a day  aspirin enteric coated 81 milliGRAM(s) Oral daily  benztropine 0.5 milliGRAM(s) Oral two times a day  buDESOnide   0.5 milliGRAM(s) Respule 0.5 milliGRAM(s) Inhalation daily  carBAMazepine XR Tablet 400 milliGRAM(s) Oral two times a day  carBAMazepine XR Tablet 100 milliGRAM(s) Oral daily  dextrose 5% + sodium chloride 0.9%. 1000 milliLiter(s) (75 mL/Hr) IV Continuous <Continuous>  docusate sodium Liquid 100 milliGRAM(s) Oral two times a day  enoxaparin Injectable 40 milliGRAM(s) SubCutaneous every 24 hours  fluticasone propionate 50 MICROgram(s)/spray Nasal Spray 1 Spray(s) Both Nostrils two times a day  folic acid 1 milliGRAM(s) Oral daily  lactulose Syrup 10 Gram(s) Oral daily  levETIRAcetam  IVPB 1000 milliGRAM(s) IV Intermittent every 12 hours  multivitamin 1 Tablet(s) Oral daily  OLANZapine 7.5 milliGRAM(s) Oral daily  pantoprazole    Tablet 40 milliGRAM(s) Oral before breakfast  phenytoin   Suspension 300 milliGRAM(s) Oral at bedtime  phenytoin   Suspension 100 milliGRAM(s) Oral before breakfast  polyethylene glycol 3350 17 Gram(s) Oral two times a day  senna 2 Tablet(s) Oral at bedtime  thiamine 100 milliGRAM(s) Oral daily  valproate sodium IVPB 500 milliGRAM(s) IV Intermittent two times a day    MEDICATIONS  (PRN):  AQUAPHOR (petrolatum Ointment) 1 Application(s) Topical two times a day PRN sacrum rash  bisacodyl Suppository 10 milliGRAM(s) Rectal daily PRN Constipation  clotrimazole 1% Cream 1 Application(s) Topical two times a day PRN rash  magnesium hydroxide Suspension 30 milliLiter(s) Oral at bedtime PRN for congestion, for constipation  nystatin Powder 1 Application(s) Topical two times a day PRN erythema of sacrum      Vital Signs Last 24 Hrs  T(C): 36.9 (22 Oct 2018 05:10), Max: 36.9 (22 Oct 2018 02:17)  T(F): 98.4 (22 Oct 2018 05:10), Max: 98.4 (22 Oct 2018 02:17)  HR: 65 (22 Oct 2018 10:45) (63 - 74)  BP: 139/82 (22 Oct 2018 05:10) (112/68 - 139/82)  BP(mean): --  RR: 18 (22 Oct 2018 05:10) (18 - 18)  SpO2: 98% (22 Oct 2018 05:10) (98% - 98%)  CAPILLARY BLOOD GLUCOSE        I&O's Summary      PHYSICAL EXAM:  GENERAL: in bed, non-verbal, NAD, sleepy but arousable   HEENT:anicteric sclera  CHEST/LUNG: Clear to percussion bilaterally; No rales, rhonchi, wheezing, or rubs.   HEART: Regular rate and rhythm; No murmurs, rubs, or gallops  ABDOMEN: Soft, Nontender, Nondistended; Bowel sounds present.    EXTREMITIES:  contraction of b/l hands. +2 edema feet, no edema of shins.   SKIN: No rashes or lesions  PSYCH: unable to assess  NEURO- pupils reactive Unable to assess further as pt does not follow commands     LABS:                        12.6   3.75  )-----------( 121      ( 22 Oct 2018 07:10 )             40.1     10-22    146<H>  |  103  |  20  ----------------------------<  106<H>  3.7   |  32<H>  |  0.56    Ca    9.0      22 Oct 2018 07:10  Mg     1.6     10-22            Urinalysis Basic - ( 21 Oct 2018 14:10 )    Color: LIGHT YELLOW / Appearance: CLEAR / S.013 / pH: 6.0  Gluc: NEGATIVE / Ketone: SMALL  / Bili: NEGATIVE / Urobili: NORMAL   Blood: NEGATIVE / Protein: NEGATIVE / Nitrite: NEGATIVE   Leuk Esterase: TRACE / RBC: 0-2 / WBC 3-5   Sq Epi: FEW / Non Sq Epi: x / Bacteria: NEGATIVE        RADIOLOGY & ADDITIONAL TESTS:    Imaging Personally Reviewed:    Care Discussed with Consultants/Other Providers:

## 2018-10-22 NOTE — PROGRESS NOTE ADULT - PROBLEM SELECTOR PLAN 5
Patient on several antiepileptics due to recent MICU for status epilecticus. No signs of seizure activity  -continue home divalproex, phenytoin, levetiracetam, carbamazepine ( missed 2 doses of carbamazepine) - IV formulation if she's not tolerating PO intake.  -seizure precautions

## 2018-10-22 NOTE — PROGRESS NOTE ADULT - PROBLEM SELECTOR PLAN 1
DW Neuro - may use Ativan 2mg x 1 for twitching   Will repeat ABG in AM if no clincal improvement - known chronic compensated hypercarbia.

## 2018-10-22 NOTE — PROGRESS NOTE ADULT - PROBLEM SELECTOR PLAN 7
Hx of LE edema w/ LE US wnl 07/2018 on furosemide  Pt on  furosemide, will hold for now given somnolence and poor PO intake

## 2018-10-22 NOTE — CHART NOTE - NSCHARTNOTEFT_GEN_A_CORE
Neurology:     EEG reviewed, showing diffuse severe slowing, which is consistent from her prior EEGs.   Depakote level 15, please redraw as she refused to take medication on day it was drawn to assess for adequate levels.   In the absence of clinical seizures or neurological changes, would keep all AEDs the same and continue medical management.   Please recall for any neurological issues.

## 2018-10-23 LAB
BACTERIA UR CULT: SIGNIFICANT CHANGE UP
BASE EXCESS BLDA CALC-SCNC: 6.8 MMOL/L — SIGNIFICANT CHANGE UP
BUN SERPL-MCNC: 17 MG/DL — SIGNIFICANT CHANGE UP (ref 7–23)
CALCIUM SERPL-MCNC: 8.9 MG/DL — SIGNIFICANT CHANGE UP (ref 8.4–10.5)
CHLORIDE SERPL-SCNC: 102 MMOL/L — SIGNIFICANT CHANGE UP (ref 98–107)
CO2 SERPL-SCNC: 29 MMOL/L — SIGNIFICANT CHANGE UP (ref 22–31)
CREAT SERPL-MCNC: 0.56 MG/DL — SIGNIFICANT CHANGE UP (ref 0.5–1.3)
GLUCOSE SERPL-MCNC: 62 MG/DL — LOW (ref 70–99)
HCO3 BLDA-SCNC: 30 MMOL/L — HIGH (ref 22–26)
HCT VFR BLD CALC: 40 % — SIGNIFICANT CHANGE UP (ref 34.5–45)
HGB BLD-MCNC: 12.6 G/DL — SIGNIFICANT CHANGE UP (ref 11.5–15.5)
MCHC RBC-ENTMCNC: 31.5 % — LOW (ref 32–36)
MCHC RBC-ENTMCNC: 32.6 PG — SIGNIFICANT CHANGE UP (ref 27–34)
MCV RBC AUTO: 103.6 FL — HIGH (ref 80–100)
NRBC # FLD: 0 — SIGNIFICANT CHANGE UP
PCO2 BLDA: 56 MMHG — HIGH (ref 32–48)
PH BLDA: 7.37 PH — SIGNIFICANT CHANGE UP (ref 7.35–7.45)
PLATELET # BLD AUTO: 123 K/UL — LOW (ref 150–400)
PMV BLD: 11.4 FL — SIGNIFICANT CHANGE UP (ref 7–13)
PO2 BLDA: 86 MMHG — SIGNIFICANT CHANGE UP (ref 83–108)
POTASSIUM SERPL-MCNC: 5.7 MMOL/L — HIGH (ref 3.5–5.3)
POTASSIUM SERPL-SCNC: 5.7 MMOL/L — HIGH (ref 3.5–5.3)
RBC # BLD: 3.86 M/UL — SIGNIFICANT CHANGE UP (ref 3.8–5.2)
RBC # FLD: 14.6 % — HIGH (ref 10.3–14.5)
SAO2 % BLDA: 96.3 % — SIGNIFICANT CHANGE UP (ref 95–99)
SODIUM SERPL-SCNC: 143 MMOL/L — SIGNIFICANT CHANGE UP (ref 135–145)
SPECIMEN SOURCE: SIGNIFICANT CHANGE UP
WBC # BLD: 4.96 K/UL — SIGNIFICANT CHANGE UP (ref 3.8–10.5)
WBC # FLD AUTO: 4.96 K/UL — SIGNIFICANT CHANGE UP (ref 3.8–10.5)

## 2018-10-23 PROCEDURE — 99233 SBSQ HOSP IP/OBS HIGH 50: CPT

## 2018-10-23 RX ORDER — IPRATROPIUM/ALBUTEROL SULFATE 18-103MCG
3 AEROSOL WITH ADAPTER (GRAM) INHALATION ONCE
Refills: 0 | Status: COMPLETED | OUTPATIENT
Start: 2018-10-23 | End: 2018-10-23

## 2018-10-23 RX ORDER — SODIUM CHLORIDE 9 MG/ML
1000 INJECTION, SOLUTION INTRAVENOUS
Refills: 0 | Status: DISCONTINUED | OUTPATIENT
Start: 2018-10-23 | End: 2018-10-28

## 2018-10-23 RX ADMIN — ENOXAPARIN SODIUM 40 MILLIGRAM(S): 100 INJECTION SUBCUTANEOUS at 17:59

## 2018-10-23 RX ADMIN — LACTULOSE 10 GRAM(S): 10 SOLUTION ORAL at 12:19

## 2018-10-23 RX ADMIN — LEVETIRACETAM 400 MILLIGRAM(S): 250 TABLET, FILM COATED ORAL at 06:15

## 2018-10-23 RX ADMIN — Medication 100 MILLIGRAM(S): at 12:20

## 2018-10-23 RX ADMIN — Medication 0.5 MILLIGRAM(S): at 10:44

## 2018-10-23 RX ADMIN — POLYETHYLENE GLYCOL 3350 17 GRAM(S): 17 POWDER, FOR SOLUTION ORAL at 17:58

## 2018-10-23 RX ADMIN — SODIUM CHLORIDE 75 MILLILITER(S): 9 INJECTION, SOLUTION INTRAVENOUS at 21:43

## 2018-10-23 RX ADMIN — PANTOPRAZOLE SODIUM 40 MILLIGRAM(S): 20 TABLET, DELAYED RELEASE ORAL at 06:32

## 2018-10-23 RX ADMIN — Medication 100 MILLIGRAM(S): at 06:30

## 2018-10-23 RX ADMIN — SENNA PLUS 2 TABLET(S): 8.6 TABLET ORAL at 21:45

## 2018-10-23 RX ADMIN — Medication 27.5 MILLIGRAM(S): at 17:58

## 2018-10-23 RX ADMIN — OLANZAPINE 7.5 MILLIGRAM(S): 15 TABLET, FILM COATED ORAL at 12:20

## 2018-10-23 RX ADMIN — Medication 1 DROP(S): at 17:56

## 2018-10-23 RX ADMIN — Medication 0.5 MILLIGRAM(S): at 06:30

## 2018-10-23 RX ADMIN — Medication 400 MILLIGRAM(S): at 06:29

## 2018-10-23 RX ADMIN — Medication 100 MILLIGRAM(S): at 17:56

## 2018-10-23 RX ADMIN — Medication 100 MILLIGRAM(S): at 12:19

## 2018-10-23 RX ADMIN — LEVETIRACETAM 400 MILLIGRAM(S): 250 TABLET, FILM COATED ORAL at 17:58

## 2018-10-23 RX ADMIN — Medication 300 MILLIGRAM(S): at 21:45

## 2018-10-23 RX ADMIN — Medication 1 TABLET(S): at 12:20

## 2018-10-23 RX ADMIN — Medication 0.5 MILLIGRAM(S): at 17:58

## 2018-10-23 RX ADMIN — Medication 100 MILLIGRAM(S): at 06:32

## 2018-10-23 RX ADMIN — Medication 1 DROP(S): at 06:28

## 2018-10-23 RX ADMIN — POLYETHYLENE GLYCOL 3350 17 GRAM(S): 17 POWDER, FOR SOLUTION ORAL at 06:33

## 2018-10-23 RX ADMIN — Medication 1 SPRAY(S): at 17:55

## 2018-10-23 RX ADMIN — Medication 1 MILLIGRAM(S): at 12:19

## 2018-10-23 RX ADMIN — Medication 3 MILLILITER(S): at 04:40

## 2018-10-23 RX ADMIN — Medication 10 MILLIGRAM(S): at 15:36

## 2018-10-23 RX ADMIN — Medication 400 MILLIGRAM(S): at 17:56

## 2018-10-23 RX ADMIN — Medication 27.5 MILLIGRAM(S): at 06:15

## 2018-10-23 RX ADMIN — Medication 81 MILLIGRAM(S): at 12:19

## 2018-10-23 RX ADMIN — Medication 1 SPRAY(S): at 06:31

## 2018-10-23 RX ADMIN — SODIUM CHLORIDE 75 MILLILITER(S): 9 INJECTION, SOLUTION INTRAVENOUS at 12:12

## 2018-10-23 NOTE — PROGRESS NOTE ADULT - SUBJECTIVE AND OBJECTIVE BOX
CC: F/U for encephalopathy    SUBJECTIVE / OVERNIGHT EVENTS:  Patient still obtunded compared to her baseline.  ABG results evaluated.  Patient unable to make her needs known.  No overnight F/C, N/V, CP, SOB, Cough, lightheadedness, dizziness, abdominal pain, diarrhea, dysuria.    MEDICATIONS  (STANDING):  artificial tears (preservative free) Ophthalmic Solution 1 Drop(s) Both EYES two times a day  aspirin enteric coated 81 milliGRAM(s) Oral daily  benztropine 0.5 milliGRAM(s) Oral two times a day  buDESOnide   0.5 milliGRAM(s) Respule 0.5 milliGRAM(s) Inhalation daily  carBAMazepine XR Tablet 400 milliGRAM(s) Oral two times a day  carBAMazepine XR Tablet 100 milliGRAM(s) Oral daily  dextrose 5% + sodium chloride 0.45%. 1000 milliLiter(s) (75 mL/Hr) IV Continuous <Continuous>  dextrose 5% + sodium chloride 0.9%. 1000 milliLiter(s) (75 mL/Hr) IV Continuous <Continuous>  docusate sodium Liquid 100 milliGRAM(s) Oral two times a day  enoxaparin Injectable 40 milliGRAM(s) SubCutaneous every 24 hours  fluticasone propionate 50 MICROgram(s)/spray Nasal Spray 1 Spray(s) Both Nostrils two times a day  folic acid 1 milliGRAM(s) Oral daily  lactulose Syrup 10 Gram(s) Oral daily  levETIRAcetam  IVPB 1000 milliGRAM(s) IV Intermittent every 12 hours  multivitamin 1 Tablet(s) Oral daily  OLANZapine 7.5 milliGRAM(s) Oral daily  pantoprazole    Tablet 40 milliGRAM(s) Oral before breakfast  phenytoin   Suspension 300 milliGRAM(s) Oral at bedtime  phenytoin   Suspension 100 milliGRAM(s) Oral before breakfast  polyethylene glycol 3350 17 Gram(s) Oral two times a day  senna 2 Tablet(s) Oral at bedtime  thiamine 100 milliGRAM(s) Oral daily  valproate sodium IVPB 500 milliGRAM(s) IV Intermittent two times a day    MEDICATIONS  (PRN):  AQUAPHOR (petrolatum Ointment) 1 Application(s) Topical two times a day PRN sacrum rash  bisacodyl Suppository 10 milliGRAM(s) Rectal daily PRN Constipation  clotrimazole 1% Cream 1 Application(s) Topical two times a day PRN rash  magnesium hydroxide Suspension 30 milliLiter(s) Oral at bedtime PRN for congestion, for constipation  nystatin Powder 1 Application(s) Topical two times a day PRN erythema of sacrum      Vital Signs Last 24 Hrs  T(C): 36.2 (23 Oct 2018 06:25), Max: 36.6 (22 Oct 2018 19:31)  T(F): 97.2 (23 Oct 2018 06:25), Max: 97.9 (22 Oct 2018 19:31)  HR: 76 (23 Oct 2018 10:45) (72 - 84)  BP: 114/62 (23 Oct 2018 06:25) (112/78 - 127/84)  BP(mean): 75 (23 Oct 2018 06:25) (75 - 75)  RR: 20 (23 Oct 2018 06:25) (19 - 20)  SpO2: 98% (23 Oct 2018 10:45) (92% - 98%)  CAPILLARY BLOOD GLUCOSE        I&O's Summary      PHYSICAL EXAM:  GENERAL: in bed, non-verbal, NAD, sleepy but arousable   HEENT:anicteric sclera  CHEST/LUNG: Clear to percussion bilaterally; No rales, rhonchi, wheezing, or rubs.   HEART: Regular rate and rhythm; No murmurs, rubs, or gallops  ABDOMEN: Soft, Nontender, Nondistended; Bowel sounds present.    EXTREMITIES:  contraction of b/l hands. +2 edema feet, no edema of shins.   SKIN: No rashes or lesions  PSYCH: unable to assess  NEURO- pupils reactive Unable to assess further as pt does not follow commands     LABS:                        12.6   4.96  )-----------( 123      ( 23 Oct 2018 06:40 )             40.0     10-23    143  |  102  |  17  ----------------------------<  62<L>  5.7<H>   |  29  |  0.56    Ca    8.9      23 Oct 2018 06:40  Mg     1.6     10-22    Blood Gas Profile - Arterial in AM (10.23.18 @ 10:18)    pH, Arterial: 7.37 pH    pCO2, Arterial: 56 mmHg    pO2, Arterial: 86 mmHg    HCO3, Arterial: 30 mmol/L    Base Excess, Arterial: 6.8: BASE EXCESS: REFERENCE RANGE = 0 (+/-) 2 mmol/l mmol/L    Oxygen Saturation, Arterial: 96.3 %                RADIOLOGY & ADDITIONAL TESTS:    Imaging Personally Reviewed:    Care Discussed with Consultants/Other Providers:

## 2018-10-23 NOTE — PROGRESS NOTE ADULT - PROBLEM SELECTOR PLAN 5
Patient on several antiepileptics due to recent MICU for status epilecticus. No signs of seizure activity  -continue home divalproex, phenytoin, levetiracetam, carbamazepine ( missed 2 doses of carbamazepine) - IV formulation if she's not tolerating PO intake.  - will follow up with neurology for recommendations.

## 2018-10-23 NOTE — PROGRESS NOTE ADULT - PROBLEM SELECTOR PLAN 1
Unlikely to be related to hypercarbia as per ABG result.  Will inquire neurology if seizure meds could be contributing to the decreased mental acuity.

## 2018-10-24 LAB
BUN SERPL-MCNC: 13 MG/DL — SIGNIFICANT CHANGE UP (ref 7–23)
CALCIUM SERPL-MCNC: 9.2 MG/DL — SIGNIFICANT CHANGE UP (ref 8.4–10.5)
CHLORIDE SERPL-SCNC: 104 MMOL/L — SIGNIFICANT CHANGE UP (ref 98–107)
CO2 SERPL-SCNC: 29 MMOL/L — SIGNIFICANT CHANGE UP (ref 22–31)
CREAT SERPL-MCNC: 0.55 MG/DL — SIGNIFICANT CHANGE UP (ref 0.5–1.3)
GLUCOSE SERPL-MCNC: 87 MG/DL — SIGNIFICANT CHANGE UP (ref 70–99)
HCT VFR BLD CALC: 40.6 % — SIGNIFICANT CHANGE UP (ref 34.5–45)
HGB BLD-MCNC: 12.7 G/DL — SIGNIFICANT CHANGE UP (ref 11.5–15.5)
MCHC RBC-ENTMCNC: 31.3 % — LOW (ref 32–36)
MCHC RBC-ENTMCNC: 32.7 PG — SIGNIFICANT CHANGE UP (ref 27–34)
MCV RBC AUTO: 104.6 FL — HIGH (ref 80–100)
NRBC # FLD: 0.02 — SIGNIFICANT CHANGE UP
PLATELET # BLD AUTO: 106 K/UL — LOW (ref 150–400)
PMV BLD: 10.9 FL — SIGNIFICANT CHANGE UP (ref 7–13)
POTASSIUM SERPL-MCNC: 3.9 MMOL/L — SIGNIFICANT CHANGE UP (ref 3.5–5.3)
POTASSIUM SERPL-SCNC: 3.9 MMOL/L — SIGNIFICANT CHANGE UP (ref 3.5–5.3)
RBC # BLD: 3.88 M/UL — SIGNIFICANT CHANGE UP (ref 3.8–5.2)
RBC # FLD: 14.5 % — SIGNIFICANT CHANGE UP (ref 10.3–14.5)
SODIUM SERPL-SCNC: 146 MMOL/L — HIGH (ref 135–145)
WBC # BLD: 4.71 K/UL — SIGNIFICANT CHANGE UP (ref 3.8–10.5)
WBC # FLD AUTO: 4.71 K/UL — SIGNIFICANT CHANGE UP (ref 3.8–10.5)

## 2018-10-24 PROCEDURE — 99233 SBSQ HOSP IP/OBS HIGH 50: CPT

## 2018-10-24 PROCEDURE — 90792 PSYCH DIAG EVAL W/MED SRVCS: CPT

## 2018-10-24 RX ORDER — OLANZAPINE 15 MG/1
5 TABLET, FILM COATED ORAL EVERY 6 HOURS
Refills: 0 | Status: DISCONTINUED | OUTPATIENT
Start: 2018-10-24 | End: 2018-10-24

## 2018-10-24 RX ORDER — OLANZAPINE 15 MG/1
5 TABLET, FILM COATED ORAL EVERY 6 HOURS
Refills: 0 | Status: DISCONTINUED | OUTPATIENT
Start: 2018-10-24 | End: 2018-11-01

## 2018-10-24 RX ORDER — OLANZAPINE 15 MG/1
7.5 TABLET, FILM COATED ORAL AT BEDTIME
Refills: 0 | Status: DISCONTINUED | OUTPATIENT
Start: 2018-10-24 | End: 2018-10-29

## 2018-10-24 RX ADMIN — Medication 1 SPRAY(S): at 06:05

## 2018-10-24 RX ADMIN — POLYETHYLENE GLYCOL 3350 17 GRAM(S): 17 POWDER, FOR SOLUTION ORAL at 06:05

## 2018-10-24 RX ADMIN — Medication 400 MILLIGRAM(S): at 06:03

## 2018-10-24 RX ADMIN — Medication 400 MILLIGRAM(S): at 17:49

## 2018-10-24 RX ADMIN — Medication 0.5 MILLIGRAM(S): at 17:49

## 2018-10-24 RX ADMIN — SODIUM CHLORIDE 75 MILLILITER(S): 9 INJECTION, SOLUTION INTRAVENOUS at 20:57

## 2018-10-24 RX ADMIN — Medication 27.5 MILLIGRAM(S): at 17:44

## 2018-10-24 RX ADMIN — PANTOPRAZOLE SODIUM 40 MILLIGRAM(S): 20 TABLET, DELAYED RELEASE ORAL at 06:03

## 2018-10-24 RX ADMIN — SENNA PLUS 2 TABLET(S): 8.6 TABLET ORAL at 23:24

## 2018-10-24 RX ADMIN — Medication 1 SPRAY(S): at 17:45

## 2018-10-24 RX ADMIN — SODIUM CHLORIDE 75 MILLILITER(S): 9 INJECTION, SOLUTION INTRAVENOUS at 06:03

## 2018-10-24 RX ADMIN — OLANZAPINE 5 MILLIGRAM(S): 15 TABLET, FILM COATED ORAL at 20:57

## 2018-10-24 RX ADMIN — LEVETIRACETAM 400 MILLIGRAM(S): 250 TABLET, FILM COATED ORAL at 17:44

## 2018-10-24 RX ADMIN — Medication 100 MILLIGRAM(S): at 06:04

## 2018-10-24 RX ADMIN — LEVETIRACETAM 400 MILLIGRAM(S): 250 TABLET, FILM COATED ORAL at 06:03

## 2018-10-24 RX ADMIN — Medication 100 MILLIGRAM(S): at 06:03

## 2018-10-24 RX ADMIN — Medication 0.5 MILLIGRAM(S): at 10:30

## 2018-10-24 RX ADMIN — Medication 100 MILLIGRAM(S): at 17:51

## 2018-10-24 RX ADMIN — Medication 1 DROP(S): at 06:06

## 2018-10-24 RX ADMIN — ENOXAPARIN SODIUM 40 MILLIGRAM(S): 100 INJECTION SUBCUTANEOUS at 17:52

## 2018-10-24 RX ADMIN — Medication 0.5 MILLIGRAM(S): at 06:03

## 2018-10-24 RX ADMIN — Medication 27.5 MILLIGRAM(S): at 06:04

## 2018-10-24 RX ADMIN — Medication 1 DROP(S): at 17:45

## 2018-10-24 RX ADMIN — Medication 300 MILLIGRAM(S): at 23:24

## 2018-10-24 NOTE — PROGRESS NOTE ADULT - PROBLEM SELECTOR PLAN 1
Unlikely to be related to hypercarbia as per ABG result.  Will inquire neurology if seizure meds could be contributing to the decreased mental acuity - however patient has been actively refusing to swallow meds, so low likelihood that this is sz med induced.  Psychiatry consult as this may be psych related condition which is worsening due to refusing PO med ingestion.

## 2018-10-24 NOTE — BEHAVIORAL HEALTH ASSESSMENT NOTE - RISK ASSESSMENT
Low. No hx of SA known, pt non verbal, unable to amputate, domiciled in group home with frequent supervision. Low. No hx of SA known, pt non verbal, unable to amputate, domiciled in group home with frequent supervision.  Risk factors: susan delirious    Not an acute risk of harm to self or others at this time

## 2018-10-24 NOTE — BEHAVIORAL HEALTH ASSESSMENT NOTE - CASE SUMMARY
53 year old female with cerebral palsy w/severe intellectual disability, non-verbal at baseline, does not ambulate, admitted from group home for wheezing, AMS, found to have hypercapnic respiratory failure on VBG. Psychiatry consulted for agitation and refusal of medications. Patient seen and evaluated, was extremely sedated, as per chart review, pt. is non verbal at baseline. Given excessive sedation and waxing/waning presentation w/recent medication refusal/agitation, and as per collateral from  group home aide pt. is less interactive/responsive and not at baseline, pt. is  likely encephalopathic. Recommend meds. as written above, monitor EKG for qtc, case discussed with Dr. Dupree.

## 2018-10-24 NOTE — PROGRESS NOTE ADULT - PROBLEM SELECTOR PLAN 5
Patient on several antiepileptics due to recent MICU for status epilecticus. No signs of seizure activity  -continue home divalproex, phenytoin, levetiracetam, carbamazepine ( missed 2 doses of carbamazepine) - IV formulation while she's not tolerating PO intake.  - will follow up with neurology for recommendations.

## 2018-10-24 NOTE — BEHAVIORAL HEALTH ASSESSMENT NOTE - SUMMARY
53 year old female with cerebral palsy w/severe intelectual disbaility, non-verbal at baseline, does not ambulate, admitted from group home for wheezing, AMS, found to have hypercapnic respiratory failure on VBG. Per primary team, patient's group home aide says she is less interactive/responsive than she typically is, and this is not her baseline. Given excessive sedation and waxing/waning presentation w/recent medication refusal/agitation, likely encephalopathic.     Recommend dilantin, carbamazepine, and depakote serum levels. If elevated may contribute to sedation, however delicate management required given patient's hx of seizure d/o and status epilepticus episode. F/u platelet count as it is currently low and may be side effect of depakote.     Recommend Zyprexa 5mg PO qhs.   PRN: Zyprexa 5mg IM q6h for agitation/aggression     PRN:       for hypercapnia possible causing AMS, however on review pt has hx of hypercapnia on VBGs, with ABG showing chronic hypercapnic. 53 year old female with cerebral palsy w/severe intelectual disbaility, non-verbal at baseline, does not ambulate, admitted from group home for wheezing, AMS, found to have hypercapnic respiratory failure on VBG. Per primary team, patient's group home aide says, pt. is non verbal at baseline however she is less interactive/responsive than she typically is, and this is not her baseline. Given excessive sedation and waxing/waning presentation w/recent medication refusal/agitation, likely encephalopathic.     Would defer to primary team/neurology team, however recommend dilantin, carbamazepine, and depakote serum levels. If elevated may contribute to sedation, however delicate management required given patient's hx of seizure d/o and status epilepticus episode. F/u platelet count as it is currently low and may be side effect of depakote.     Recommend to continue Zyprexa 7.5mg PO qhs.   PRN: Zyprexa 5mg PO/IM q6h for agitation/aggression

## 2018-10-24 NOTE — PROGRESS NOTE ADULT - SUBJECTIVE AND OBJECTIVE BOX
CC: F/U for encephalopathy    SUBJECTIVE / OVERNIGHT EVENTS:  Patient is agitated at time.  Patient is actively spitting out meds and refusing to open eyes and eat.    No overnight issues with F/C, N/V, SOB, Cough, diarrhea.    MEDICATIONS  (STANDING):  artificial tears (preservative free) Ophthalmic Solution 1 Drop(s) Both EYES two times a day  aspirin enteric coated 81 milliGRAM(s) Oral daily  benztropine 0.5 milliGRAM(s) Oral two times a day  buDESOnide   0.5 milliGRAM(s) Respule 0.5 milliGRAM(s) Inhalation daily  carBAMazepine XR Tablet 400 milliGRAM(s) Oral two times a day  carBAMazepine XR Tablet 100 milliGRAM(s) Oral daily  dextrose 5% + sodium chloride 0.45%. 1000 milliLiter(s) (75 mL/Hr) IV Continuous <Continuous>  dextrose 5% + sodium chloride 0.9%. 1000 milliLiter(s) (75 mL/Hr) IV Continuous <Continuous>  docusate sodium Liquid 100 milliGRAM(s) Oral two times a day  enoxaparin Injectable 40 milliGRAM(s) SubCutaneous every 24 hours  fluticasone propionate 50 MICROgram(s)/spray Nasal Spray 1 Spray(s) Both Nostrils two times a day  folic acid 1 milliGRAM(s) Oral daily  lactulose Syrup 10 Gram(s) Oral daily  levETIRAcetam  IVPB 1000 milliGRAM(s) IV Intermittent every 12 hours  multivitamin 1 Tablet(s) Oral daily  OLANZapine 7.5 milliGRAM(s) Oral daily  pantoprazole    Tablet 40 milliGRAM(s) Oral before breakfast  phenytoin   Suspension 300 milliGRAM(s) Oral at bedtime  phenytoin   Suspension 100 milliGRAM(s) Oral before breakfast  polyethylene glycol 3350 17 Gram(s) Oral two times a day  senna 2 Tablet(s) Oral at bedtime  thiamine 100 milliGRAM(s) Oral daily  valproate sodium IVPB 500 milliGRAM(s) IV Intermittent two times a day    MEDICATIONS  (PRN):  AQUAPHOR (petrolatum Ointment) 1 Application(s) Topical two times a day PRN sacrum rash  bisacodyl Suppository 10 milliGRAM(s) Rectal daily PRN Constipation  clotrimazole 1% Cream 1 Application(s) Topical two times a day PRN rash  magnesium hydroxide Suspension 30 milliLiter(s) Oral at bedtime PRN for congestion, for constipation  nystatin Powder 1 Application(s) Topical two times a day PRN erythema of sacrum      Vital Signs Last 24 Hrs  T(C): 36.7 (24 Oct 2018 14:39), Max: 36.7 (24 Oct 2018 14:39)  T(F): 98 (24 Oct 2018 14:39), Max: 98 (24 Oct 2018 14:39)  HR: 63 (24 Oct 2018 14:39) (63 - 78)  BP: 125/84 (24 Oct 2018 14:39) (114/60 - 125/84)  BP(mean): 95 (24 Oct 2018 05:54) (73 - 95)  RR: 18 (24 Oct 2018 14:39) (16 - 18)  SpO2: 92% (24 Oct 2018 14:39) (92% - 97%)  CAPILLARY BLOOD GLUCOSE        I&O's Summary      PHYSICAL EXAM:  GENERAL: in bed, non-verbal, NAD, sleepy but arousable   HEENT:anicteric sclera  CHEST/LUNG: Clear to percussion bilaterally; No rales, rhonchi, wheezing, or rubs.   HEART: Regular rate and rhythm; No murmurs, rubs, or gallops  ABDOMEN: Soft, Nontender, Nondistended; Bowel sounds present.    EXTREMITIES:  contraction of b/l hands. +2 edema feet, no edema of shins.   SKIN: No rashes or lesions  PSYCH: not actively participating.  NEURO- pupils reactive Unable to assess further as pt does not follow commands     LABS:                        12.7   4.71  )-----------( 106      ( 24 Oct 2018 06:35 )             40.6     10-24    146<H>  |  104  |  13  ----------------------------<  87  3.9   |  29  |  0.55    Ca    9.2      24 Oct 2018 06:35                RADIOLOGY & ADDITIONAL TESTS:    Imaging Personally Reviewed:    Care Discussed with Consultants/Other Providers:

## 2018-10-24 NOTE — BEHAVIORAL HEALTH ASSESSMENT NOTE - NSBHCONSULTMEDAGITATION_PSY_A_CORE FT
Zyprexa 5mg IM q6h prn agitation/aggression.  Do not administer IM if patient simply refuses standing dose. Use only for aggression/agitation.

## 2018-10-24 NOTE — BEHAVIORAL HEALTH ASSESSMENT NOTE - OTHER
non-verbal unable to assess Group home Non verbal asleep, unable to assess sedated, unable to assess unable to assess, sedated unable to assess, sedated and non verbal at baseline unable to assess, non verbal at baseline non-verbal ar baseline as per chart

## 2018-10-24 NOTE — BEHAVIORAL HEALTH ASSESSMENT NOTE - NSBHCHARTREVIEWLAB_PSY_A_CORE FT
12.7   4.71  )-----------( 106      ( 24 Oct 2018 06:35 )             40.6     10-24    146<H>  |  104  |  13  ----------------------------<  87  3.9   |  29  |  0.55    Ca    9.2      24 Oct 2018 06:35

## 2018-10-24 NOTE — BEHAVIORAL HEALTH ASSESSMENT NOTE - HPI (INCLUDE ILLNESS QUALITY, SEVERITY, DURATION, TIMING, CONTEXT, MODIFYING FACTORS, ASSOCIATED SIGNS AND SYMPTOMS)
Pt non-verbal. Patient non verbal, appears overly sedated, opens eyes with verbal stimulation. Per H&P    53 year old female with CP (a/w severe ID, baseline non verbal, does not ambulate, however can feed herself, follows some commands), intractable seizures who was admitted with wheezing, constipation and edema of LE per aid at bedside. Patient nonverbal at baseline and asleep. In ED was agitated and required sedation for lab/CT scan given lorazepam 2mg, haloperidol 2mg and 3mg. Patient found to have hypercapnic respiratory failure on VBG after sedation and pulm was called for hypercapnia possible causing AMS, however on review pt has hx of hypercapnia on VBGs, with ABG showing chronic hypercapnic.

## 2018-10-24 NOTE — PROGRESS NOTE ADULT - ASSESSMENT
53F admitted with acute metabolic encephalopathy complicated by severe constipation, chronic hypercarbia, cerebral palsy - non-verbal, bipolar/psychosis, epilepsy.

## 2018-10-24 NOTE — BEHAVIORAL HEALTH ASSESSMENT NOTE - NSBHCONSULTFOLLOWAFTERCARE_PSY_A_CORE FT
Follow up with the psychiatrist at   Additional referrals:  [facility] outpt. walk in clinic : [phone] (9AM-7PM)  [facility] Outpatient clinic: [phone]

## 2018-10-24 NOTE — BEHAVIORAL HEALTH ASSESSMENT NOTE - NSBHCHARTREVIEWVS_PSY_A_CORE FT
T(C): 36.7 (10-24-18 @ 14:39), Max: 36.7 (10-24-18 @ 14:39)  HR: 63 (10-24-18 @ 14:39) (63 - 78)  BP: 125/84 (10-24-18 @ 14:39) (114/60 - 125/84)  RR: 18 (10-24-18 @ 14:39) (16 - 18)  SpO2: 92% (10-24-18 @ 14:39) (92% - 97%)  Wt(kg): --

## 2018-10-25 LAB
BACTERIA BLD CULT: SIGNIFICANT CHANGE UP
BUN SERPL-MCNC: 9 MG/DL — SIGNIFICANT CHANGE UP (ref 7–23)
CALCIUM SERPL-MCNC: 8.5 MG/DL — SIGNIFICANT CHANGE UP (ref 8.4–10.5)
CARBAMAZEPINE SERPL-MCNC: 3.6 UG/ML — LOW (ref 4–12)
CHLORIDE SERPL-SCNC: 105 MMOL/L — SIGNIFICANT CHANGE UP (ref 98–107)
CO2 SERPL-SCNC: 26 MMOL/L — SIGNIFICANT CHANGE UP (ref 22–31)
CREAT SERPL-MCNC: 0.45 MG/DL — LOW (ref 0.5–1.3)
GLUCOSE SERPL-MCNC: 82 MG/DL — SIGNIFICANT CHANGE UP (ref 70–99)
HCT VFR BLD CALC: 39.7 % — SIGNIFICANT CHANGE UP (ref 34.5–45)
HGB BLD-MCNC: 12.6 G/DL — SIGNIFICANT CHANGE UP (ref 11.5–15.5)
MCHC RBC-ENTMCNC: 31.7 % — LOW (ref 32–36)
MCHC RBC-ENTMCNC: 32.4 PG — SIGNIFICANT CHANGE UP (ref 27–34)
MCV RBC AUTO: 102.1 FL — HIGH (ref 80–100)
NRBC # FLD: 0.03 — SIGNIFICANT CHANGE UP
PHENYTOIN FREE SERPL-MCNC: 25.5 UG/ML — HIGH (ref 10–20)
PLATELET # BLD AUTO: 110 K/UL — LOW (ref 150–400)
PMV BLD: 11.4 FL — SIGNIFICANT CHANGE UP (ref 7–13)
POTASSIUM SERPL-MCNC: 4.4 MMOL/L — SIGNIFICANT CHANGE UP (ref 3.5–5.3)
POTASSIUM SERPL-SCNC: 4.4 MMOL/L — SIGNIFICANT CHANGE UP (ref 3.5–5.3)
RBC # BLD: 3.89 M/UL — SIGNIFICANT CHANGE UP (ref 3.8–5.2)
RBC # FLD: 14.2 % — SIGNIFICANT CHANGE UP (ref 10.3–14.5)
SODIUM SERPL-SCNC: 145 MMOL/L — SIGNIFICANT CHANGE UP (ref 135–145)
VALPROATE SERPL-MCNC: 32.7 UG/ML — LOW (ref 50–100)
WBC # BLD: 5.5 K/UL — SIGNIFICANT CHANGE UP (ref 3.8–10.5)
WBC # FLD AUTO: 5.5 K/UL — SIGNIFICANT CHANGE UP (ref 3.8–10.5)

## 2018-10-25 PROCEDURE — 99233 SBSQ HOSP IP/OBS HIGH 50: CPT

## 2018-10-25 PROCEDURE — 99232 SBSQ HOSP IP/OBS MODERATE 35: CPT

## 2018-10-25 RX ADMIN — LEVETIRACETAM 400 MILLIGRAM(S): 250 TABLET, FILM COATED ORAL at 05:47

## 2018-10-25 RX ADMIN — Medication 200 MILLIGRAM(S): at 22:39

## 2018-10-25 RX ADMIN — Medication 1 SPRAY(S): at 05:45

## 2018-10-25 RX ADMIN — Medication 10 MILLIGRAM(S): at 05:46

## 2018-10-25 RX ADMIN — SODIUM CHLORIDE 75 MILLILITER(S): 9 INJECTION, SOLUTION INTRAVENOUS at 11:51

## 2018-10-25 RX ADMIN — Medication 1 SPRAY(S): at 18:14

## 2018-10-25 RX ADMIN — Medication 27.5 MILLIGRAM(S): at 05:47

## 2018-10-25 RX ADMIN — LEVETIRACETAM 400 MILLIGRAM(S): 250 TABLET, FILM COATED ORAL at 19:00

## 2018-10-25 RX ADMIN — OLANZAPINE 7.5 MILLIGRAM(S): 15 TABLET, FILM COATED ORAL at 22:34

## 2018-10-25 RX ADMIN — Medication 1 DROP(S): at 05:46

## 2018-10-25 RX ADMIN — ENOXAPARIN SODIUM 40 MILLIGRAM(S): 100 INJECTION SUBCUTANEOUS at 18:14

## 2018-10-25 RX ADMIN — SENNA PLUS 2 TABLET(S): 8.6 TABLET ORAL at 22:34

## 2018-10-25 RX ADMIN — Medication 1 DROP(S): at 18:13

## 2018-10-25 RX ADMIN — Medication 27.5 MILLIGRAM(S): at 18:26

## 2018-10-25 NOTE — PROGRESS NOTE ADULT - PROBLEM SELECTOR PLAN 3
Patient on several antiepileptics due to recent MICU for status epilecticus. No signs of seizure activity  -continue home divalproex, phenytoin, levetiracetam, carbamazepine ( missed 2 doses of carbamazepine) - IV formulation while she's not tolerating PO intake.  - check the levels of Phenytoin, valproic acid, carbamazepine.  - will follow up with neurology for recommendations.

## 2018-10-25 NOTE — PROGRESS NOTE ADULT - SUBJECTIVE AND OBJECTIVE BOX
CC: F/U for encephalopathy    SUBJECTIVE / OVERNIGHT EVENTS:  Sleeping this morning but had moment of lucidity with interaction this AM.  Still refusing to tolerate PO meds/food intake with combativeness. Blood draw has been difficult due to patient's agitation when phlebotomy is attempted.  No overnight F/C, N/V, CP, SOB, Cough, diarrhea.    MEDICATIONS  (STANDING):  artificial tears (preservative free) Ophthalmic Solution 1 Drop(s) Both EYES two times a day  aspirin enteric coated 81 milliGRAM(s) Oral daily  benztropine 0.5 milliGRAM(s) Oral two times a day  buDESOnide   0.5 milliGRAM(s) Respule 0.5 milliGRAM(s) Inhalation daily  carBAMazepine XR Tablet 400 milliGRAM(s) Oral two times a day  carBAMazepine XR Tablet 100 milliGRAM(s) Oral daily  dextrose 5% + sodium chloride 0.45%. 1000 milliLiter(s) (75 mL/Hr) IV Continuous <Continuous>  dextrose 5% + sodium chloride 0.9%. 1000 milliLiter(s) (75 mL/Hr) IV Continuous <Continuous>  docusate sodium Liquid 100 milliGRAM(s) Oral two times a day  enoxaparin Injectable 40 milliGRAM(s) SubCutaneous every 24 hours  fluticasone propionate 50 MICROgram(s)/spray Nasal Spray 1 Spray(s) Both Nostrils two times a day  folic acid 1 milliGRAM(s) Oral daily  lactulose Syrup 10 Gram(s) Oral daily  levETIRAcetam  IVPB 1000 milliGRAM(s) IV Intermittent every 12 hours  multivitamin 1 Tablet(s) Oral daily  OLANZapine 7.5 milliGRAM(s) Oral at bedtime  pantoprazole    Tablet 40 milliGRAM(s) Oral before breakfast  phenytoin   Suspension 300 milliGRAM(s) Oral at bedtime  phenytoin   Suspension 100 milliGRAM(s) Oral before breakfast  polyethylene glycol 3350 17 Gram(s) Oral two times a day  senna 2 Tablet(s) Oral at bedtime  thiamine 100 milliGRAM(s) Oral daily  valproate sodium IVPB 500 milliGRAM(s) IV Intermittent two times a day    MEDICATIONS  (PRN):  AQUAPHOR (petrolatum Ointment) 1 Application(s) Topical two times a day PRN sacrum rash  bisacodyl Suppository 10 milliGRAM(s) Rectal daily PRN Constipation  clotrimazole 1% Cream 1 Application(s) Topical two times a day PRN rash  magnesium hydroxide Suspension 30 milliLiter(s) Oral at bedtime PRN for congestion, for constipation  nystatin Powder 1 Application(s) Topical two times a day PRN erythema of sacrum  OLANZapine Injectable 5 milliGRAM(s) IntraMuscular every 6 hours PRN agitation      Vital Signs Last 24 Hrs  T(C): 36.7 (25 Oct 2018 04:36), Max: 36.7 (24 Oct 2018 14:39)  T(F): 98 (25 Oct 2018 04:36), Max: 98 (24 Oct 2018 14:39)  HR: 67 (25 Oct 2018 04:36) (63 - 68)  BP: 127/85 (25 Oct 2018 04:36) (125/84 - 135/85)  BP(mean): --  RR: 20 (25 Oct 2018 04:36) (18 - 20)  SpO2: 93% (25 Oct 2018 04:36) (92% - 93%)  CAPILLARY BLOOD GLUCOSE        I&O's Summary      PHYSICAL EXAM:  GENERAL: in bed, non-verbal, NAD, sleepy but arousable   HEENT:anicteric sclera  CHEST/LUNG: Clear to percussion bilaterally; No rales, rhonchi, wheezing, or rubs.   HEART: Regular rate and rhythm; No murmurs, rubs, or gallops  ABDOMEN: Soft, Nontender, Nondistended; Bowel sounds present.    EXTREMITIES:  contraction of b/l hands. +2 edema feet, no edema of shins.   SKIN: No rashes or lesions  PSYCH: not actively participating.  NEURO- pupils reactive Unable to assess further as pt does not follow commands     LABS:                        12.6   5.50  )-----------( 110      ( 25 Oct 2018 06:55 )             39.7     10-25    145  |  105  |  9   ----------------------------<  82  4.4   |  26  |  0.45<L>    Ca    8.5      25 Oct 2018 06:55                RADIOLOGY & ADDITIONAL TESTS:    Imaging Personally Reviewed:    Care Discussed with Consultants/Other Providers:

## 2018-10-25 NOTE — PROGRESS NOTE BEHAVIORAL HEALTH - OTHER
unable to assess, non verbal at baseline unable to assess, non-verbal Not assessed, pt. in bed limited more alert as per chart, pt. non-verbal ar baseline non-verbal

## 2018-10-25 NOTE — PROGRESS NOTE ADULT - PROBLEM SELECTOR PLAN 1
Unlikely to be related to hypercarbia as per ABG result.  Checking seizure meds levels to see if it's contributing to the decreased mental acuity.  Psychiatry consult appreciated.  Zyprexa changed to IM while patient is refusing PO meds.

## 2018-10-26 DIAGNOSIS — T68.XXXA HYPOTHERMIA, INITIAL ENCOUNTER: ICD-10-CM

## 2018-10-26 LAB
APPEARANCE UR: SIGNIFICANT CHANGE UP
BACTERIA # UR AUTO: HIGH
BILIRUB UR-MCNC: NEGATIVE — SIGNIFICANT CHANGE UP
BLOOD UR QL VISUAL: HIGH
COLOR SPEC: SIGNIFICANT CHANGE UP
GLUCOSE UR-MCNC: NEGATIVE — SIGNIFICANT CHANGE UP
HYALINE CASTS # UR AUTO: HIGH
KETONES UR-MCNC: SIGNIFICANT CHANGE UP
LEUKOCYTE ESTERASE UR-ACNC: SIGNIFICANT CHANGE UP
NITRITE UR-MCNC: NEGATIVE — SIGNIFICANT CHANGE UP
PH UR: 6.5 — SIGNIFICANT CHANGE UP (ref 5–8)
PROT UR-MCNC: 70 — SIGNIFICANT CHANGE UP
RBC CASTS # UR COMP ASSIST: >50 — HIGH (ref 0–?)
SP GR SPEC: 1.01 — SIGNIFICANT CHANGE UP (ref 1–1.04)
SQUAMOUS # UR AUTO: SIGNIFICANT CHANGE UP
UROBILINOGEN FLD QL: NORMAL — SIGNIFICANT CHANGE UP
WBC UR QL: >50 — HIGH (ref 0–?)

## 2018-10-26 PROCEDURE — 99223 1ST HOSP IP/OBS HIGH 75: CPT | Mod: GC

## 2018-10-26 PROCEDURE — 99233 SBSQ HOSP IP/OBS HIGH 50: CPT

## 2018-10-26 PROCEDURE — 71250 CT THORAX DX C-: CPT | Mod: 26

## 2018-10-26 PROCEDURE — 99232 SBSQ HOSP IP/OBS MODERATE 35: CPT

## 2018-10-26 PROCEDURE — 71045 X-RAY EXAM CHEST 1 VIEW: CPT | Mod: 26

## 2018-10-26 RX ORDER — PIPERACILLIN AND TAZOBACTAM 4; .5 G/20ML; G/20ML
3.38 INJECTION, POWDER, LYOPHILIZED, FOR SOLUTION INTRAVENOUS ONCE
Refills: 0 | Status: DISCONTINUED | OUTPATIENT
Start: 2018-10-26 | End: 2018-10-31

## 2018-10-26 RX ORDER — PIPERACILLIN AND TAZOBACTAM 4; .5 G/20ML; G/20ML
3.38 INJECTION, POWDER, LYOPHILIZED, FOR SOLUTION INTRAVENOUS EVERY 8 HOURS
Refills: 0 | Status: DISCONTINUED | OUTPATIENT
Start: 2018-10-26 | End: 2018-10-31

## 2018-10-26 RX ADMIN — Medication 100 MILLIGRAM(S): at 06:24

## 2018-10-26 RX ADMIN — Medication 100 MILLIGRAM(S): at 12:23

## 2018-10-26 RX ADMIN — PIPERACILLIN AND TAZOBACTAM 25 GRAM(S): 4; .5 INJECTION, POWDER, LYOPHILIZED, FOR SOLUTION INTRAVENOUS at 15:17

## 2018-10-26 RX ADMIN — Medication 27.5 MILLIGRAM(S): at 19:40

## 2018-10-26 RX ADMIN — Medication 1 SPRAY(S): at 06:25

## 2018-10-26 RX ADMIN — Medication 27.5 MILLIGRAM(S): at 06:24

## 2018-10-26 RX ADMIN — Medication 100 MILLIGRAM(S): at 06:23

## 2018-10-26 RX ADMIN — SODIUM CHLORIDE 75 MILLILITER(S): 9 INJECTION, SOLUTION INTRAVENOUS at 22:30

## 2018-10-26 RX ADMIN — SODIUM CHLORIDE 75 MILLILITER(S): 9 INJECTION, SOLUTION INTRAVENOUS at 06:16

## 2018-10-26 RX ADMIN — LEVETIRACETAM 400 MILLIGRAM(S): 250 TABLET, FILM COATED ORAL at 06:02

## 2018-10-26 RX ADMIN — PANTOPRAZOLE SODIUM 40 MILLIGRAM(S): 20 TABLET, DELAYED RELEASE ORAL at 06:23

## 2018-10-26 RX ADMIN — Medication 1 DROP(S): at 17:13

## 2018-10-26 RX ADMIN — POLYETHYLENE GLYCOL 3350 17 GRAM(S): 17 POWDER, FOR SOLUTION ORAL at 06:24

## 2018-10-26 RX ADMIN — Medication 400 MILLIGRAM(S): at 06:23

## 2018-10-26 RX ADMIN — Medication 1 MILLIGRAM(S): at 12:24

## 2018-10-26 RX ADMIN — Medication 0.5 MILLIGRAM(S): at 10:44

## 2018-10-26 RX ADMIN — PIPERACILLIN AND TAZOBACTAM 25 GRAM(S): 4; .5 INJECTION, POWDER, LYOPHILIZED, FOR SOLUTION INTRAVENOUS at 22:30

## 2018-10-26 RX ADMIN — ENOXAPARIN SODIUM 40 MILLIGRAM(S): 100 INJECTION SUBCUTANEOUS at 17:12

## 2018-10-26 RX ADMIN — Medication 81 MILLIGRAM(S): at 12:24

## 2018-10-26 RX ADMIN — LEVETIRACETAM 400 MILLIGRAM(S): 250 TABLET, FILM COATED ORAL at 19:03

## 2018-10-26 RX ADMIN — Medication 1 SPRAY(S): at 17:12

## 2018-10-26 RX ADMIN — Medication 1 DROP(S): at 06:24

## 2018-10-26 RX ADMIN — Medication 0.5 MILLIGRAM(S): at 06:23

## 2018-10-26 RX ADMIN — Medication 100 MILLIGRAM(S): at 12:24

## 2018-10-26 RX ADMIN — Medication 1 TABLET(S): at 12:23

## 2018-10-26 NOTE — PROGRESS NOTE ADULT - ATTENDING COMMENTS
hypothermia- blood cult/ ua/ cxr-ord hypothermia- blood cult/ urine cult- sent  cxr-done 10/26- results-  Isd consult called hypothermia- blood cult/ urine cult- sent  cxr-done 10/26- results-no PNA  Ua- large leuk-start Zosyn IV StAt  Id consult called

## 2018-10-26 NOTE — PROGRESS NOTE BEHAVIORAL HEALTH - NSBHCHARTREVIEWLAB_PSY_A_CORE FT
12.6   5.50  )-----------( 110      ( 25 Oct 2018 06:55 )             39.7
12.6   5.50  )-----------( 110      ( 25 Oct 2018 06:55 )             39.7

## 2018-10-26 NOTE — CONSULT NOTE ADULT - ASSESSMENT
53F admitted with acute metabolic encephalopathy complicated by severe constipation, chronic hypercarbia, cerebral palsy - non-verbal, bipolar/psychosis, epilepsy.  Patient had hypothermia overnight.  ID consulted for rule out infection for change in baseline mental status. 53F admitted with acute metabolic encephalopathy complicated by severe constipation, chronic hypercarbia, cerebral palsy - non-verbal, bipolar/psychosis, epilepsy.  Patient had hypothermia overnight.  ID consulted for rule out infection for change in baseline mental status.   Temperatures have returned to alberto.  no leukocytosis and per the nurse she seems to be more alert and closer to her baseline. RN is worried about aspiration.  CXR nonrevealing   Could this be noninfectious and be related to her underlying CP, her seizure disorder, anti-epileptic medication, hospital acquired delirium?    Suggest:  -Continue Zozyn 3.375g q8hrs   -Follow up on blood and urine cultures  -consider obtaining dedicated Ct chest without contrast   -Speech and Swallow evaluation to rule out aspiration   -Please reconsult neurology to rule out noninfectious etiology 53 F with CP. non verbal, seizure disorder, constipation, was brought in initially for constipation and ?wheezing, was afebrile, normal WBC, hypercapnia on ABG, but negative urine and blood cx, CXR limited but no consolidation, abd/plevis CT also no acute pathology  ID was called now as pt was hypothermic  no tachycardia or hypotension, exam unremarkable normal WBC, but positive u/a with straight cath and pt with cough while eating    hypothermia due to infectious etiology like UTI or aspiration pneumonia vs central causes    * Continue Zozyn 3.375g q8hrs   * Follow up on blood and urine cultures  * if more hypothermia or change in the status get a chest/abd/pelvis CT with contrast

## 2018-10-26 NOTE — PROGRESS NOTE BEHAVIORAL HEALTH - SUMMARY
53 year old female with cerebral palsy w/severe intelectual disbaility, non-verbal at baseline, does not ambulate, admitted from group home for wheezing, AMS, found to have hypercapnic respiratory failure on VBG. Per primary team, patient's group home aide says, pt. is non verbal at baseline however she is less interactive/responsive than she typically is, and this is not her baseline. Given excessive sedation and waxing/waning presentation w/recent medication refusal/agitation, likely encephalopathic.     10/24: Would defer to primary team/neurology team, however recommend dilantin, carbamazepine, and depakote serum levels. If elevated may contribute to sedation, however delicate management required given patient's hx of seizure d/o and status epilepticus episode. F/u platelet count as it is currently low and may be side effect of depakote.       10/25: Pt. appeared more alert, however still refusing to eat, intermittently refusing meds, required PRN Zyprexa last night, as per aid at bedside, , pt. is usually more interactive.  Given waxing/waning  presentation w/recent intermittent medication refusal/agitation, pt. likely encephalopathic.     10/26: Patient seen and evaluated, appears alert today, not sedated, however did not engage in interview, as pt. is non-verbal at baseline. As per aid from  at bedside: pt. is usually more interactive and still not at her baseline yet. Aid was able to feed her apple sauce and some milk.  pt. did not get any PRNs overnight and took her standing Zyprexa  yesterday. Presentation is c/w delirium now appears to be improving.     Recommend to continue Zyprexa 7.5mg PO qhs.   PRN: Zyprexa 5mg PO/IM q6h for agitation/aggression    Monitor EKG for qtc. Will follow
53 year old female with cerebral palsy w/severe intelectual disbaility, non-verbal at baseline, does not ambulate, admitted from group home for wheezing, AMS, found to have hypercapnic respiratory failure on VBG. Per primary team, patient's group home aide says, pt. is non verbal at baseline however she is less interactive/responsive than she typically is, and this is not her baseline. Given excessive sedation and waxing/waning presentation w/recent medication refusal/agitation, likely encephalopathic.     10/24: Would defer to primary team/neurology team, however recommend dilantin, carbamazepine, and depakote serum levels. If elevated may contribute to sedation, however delicate management required given patient's hx of seizure d/o and status epilepticus episode. F/u platelet count as it is currently low and may be side effect of depakote.       10/25: Pt. appeared more alert, however still refusing to eat, intermittently refusing meds, required PRN Zyprexa last night, as per aid at bedside, , pt. is usually more interactive.  Given waxing/waning  presentation w/recent intermittent medication refusal/agitation, pt. likely encephalopathic.     Recommend to continue Zyprexa 7.5mg PO qhs.   PRN: Zyprexa 5mg PO/IM q6h for agitation/aggression

## 2018-10-26 NOTE — PROGRESS NOTE ADULT - ASSESSMENT
53F admitted with acute metabolic encephalopathy complicated by severe constipation, chronic hypercarbia, cerebral palsy - non-verbal, bipolar/psychosis, epilepsy.  Patient had hypothermia overnight.  -warming blanket started.  Will get blood and Ua and CXR

## 2018-10-26 NOTE — PROGRESS NOTE BEHAVIORAL HEALTH - NSBHCONSULTWRKUPYES_PSY_A_CORE
Dilantin, carbamazepine, and depakote serum levels/labs
Dilantin, carbamazepine, and depakote serum levels/labs

## 2018-10-26 NOTE — PROGRESS NOTE BEHAVIORAL HEALTH - NSBHFUPTYPE_PSY_A_CORE
Spoke with Lab at [facility] and they are going to fax result over  
Consult Follow Up
Consult Follow Up

## 2018-10-26 NOTE — PROGRESS NOTE ADULT - SUBJECTIVE AND OBJECTIVE BOX
Patient is a 53y old  Female who presents with a chief complaint of Obstipation (26 Oct 2018 11:55)      SUBJECTIVE / OVERNIGHT EVENTS:  Patient seen with Julia - care giver at bedside.  Julia noted patient came in for constipation but is now lethargic and not eating as before- patient is NOT back to baseline.    MEDICATIONS  (STANDING):  artificial tears (preservative free) Ophthalmic Solution 1 Drop(s) Both EYES two times a day  aspirin enteric coated 81 milliGRAM(s) Oral daily  benztropine 0.5 milliGRAM(s) Oral two times a day  buDESOnide   0.5 milliGRAM(s) Respule 0.5 milliGRAM(s) Inhalation daily  carBAMazepine XR Tablet 400 milliGRAM(s) Oral two times a day  carBAMazepine XR Tablet 100 milliGRAM(s) Oral daily  dextrose 5% + sodium chloride 0.45%. 1000 milliLiter(s) (75 mL/Hr) IV Continuous <Continuous>  dextrose 5% + sodium chloride 0.9%. 1000 milliLiter(s) (75 mL/Hr) IV Continuous <Continuous>  docusate sodium Liquid 100 milliGRAM(s) Oral two times a day  enoxaparin Injectable 40 milliGRAM(s) SubCutaneous every 24 hours  fluticasone propionate 50 MICROgram(s)/spray Nasal Spray 1 Spray(s) Both Nostrils two times a day  folic acid 1 milliGRAM(s) Oral daily  lactulose Syrup 10 Gram(s) Oral daily  levETIRAcetam  IVPB 1000 milliGRAM(s) IV Intermittent every 12 hours  multivitamin 1 Tablet(s) Oral daily  OLANZapine 7.5 milliGRAM(s) Oral at bedtime  pantoprazole    Tablet 40 milliGRAM(s) Oral before breakfast  phenytoin   Suspension 100 milliGRAM(s) Oral before breakfast  phenytoin   Suspension 200 milliGRAM(s) Oral at bedtime  polyethylene glycol 3350 17 Gram(s) Oral two times a day  senna 2 Tablet(s) Oral at bedtime  thiamine 100 milliGRAM(s) Oral daily  valproate sodium IVPB 500 milliGRAM(s) IV Intermittent two times a day    MEDICATIONS  (PRN):  AQUAPHOR (petrolatum Ointment) 1 Application(s) Topical two times a day PRN sacrum rash  bisacodyl Suppository 10 milliGRAM(s) Rectal daily PRN Constipation  clotrimazole 1% Cream 1 Application(s) Topical two times a day PRN rash  magnesium hydroxide Suspension 30 milliLiter(s) Oral at bedtime PRN for congestion, for constipation  nystatin Powder 1 Application(s) Topical two times a day PRN erythema of sacrum  OLANZapine Injectable 5 milliGRAM(s) IntraMuscular every 6 hours PRN agitation          PHYSICAL EXAM:  Vital Signs Last 24 Hrs  T(C): 37.2 (26 Oct 2018 06:20), Max: 37.2 (26 Oct 2018 06:20)  T(F): 98.9 (26 Oct 2018 06:20), Max: 98.9 (26 Oct 2018 06:20)  HR: 80 (26 Oct 2018 10:44) (71 - 84)  BP: 123/69 (26 Oct 2018 06:20) (109/64 - 123/69)  BP(mean): --  RR: 20 (26 Oct 2018 06:20) (17 - 20)  SpO2: 96% (26 Oct 2018 06:20) (94% - 100%)  GENERAL: NAD, well-developed  HEAD:  Atraumatic, Normocephalic  EYES: EOMI, PERRLA, conjunctiva and sclera clear  NECK: Supple, No JVD  CHEST/LUNG: Clear to auscultation bilaterally; No wheeze  HEART: Regular rate and rhythm; No murmurs, rubs, or gallops  ABDOMEN: Soft, Nontender, Nondistended; Bowel sounds present  EXTREMITIES:  2+ Peripheral Pulses, No clubbing, cyanosis, or edema  PSYCH: AAOx3  NEUROLOGY: non-focal  SKIN: No rashes or lesions    LABS:                        12.6   5.50  )-----------( 110      ( 25 Oct 2018 06:55 )             39.7     10-    145  |  105  |  9   ----------------------------<  82  4.4   |  26  |  0.45<L>    Ca    8.5      25 Oct 2018 06:55      Urinalysis Basic - ( 26 Oct 2018 11:25 )    Color: LIGHT ORANGE / Appearance: TURBID / S.012 / pH: 6.5  Gluc: NEGATIVE / Ketone: TRACE  / Bili: NEGATIVE / Urobili: NORMAL   Blood: LARGE / Protein: 70 / Nitrite: NEGATIVE   Leuk Esterase: LARGE / RBC: >50 / WBC >50   Sq Epi: OCC / Non Sq Epi: x / Bacteria: MODERATE        RADIOLOGY & ADDITIONAL TESTS:    Imaging Personally Reviewed:    Consultant(s) Notes Reviewed:      Care Discussed with Consultants/Other Providers: Patient is a 53y old  Female who presents with a chief complaint of Obstipation (26 Oct 2018 11:55)      SUBJECTIVE / OVERNIGHT EVENTS:  Patient seen with Julia - care giver at bedside.  Julia noted patient came in for constipation but is now lethargic and not eating as before- patient is NOT back to baseline.    MEDICATIONS  (STANDING):  artificial tears (preservative free) Ophthalmic Solution 1 Drop(s) Both EYES two times a day  aspirin enteric coated 81 milliGRAM(s) Oral daily  benztropine 0.5 milliGRAM(s) Oral two times a day  buDESOnide   0.5 milliGRAM(s) Respule 0.5 milliGRAM(s) Inhalation daily  carBAMazepine XR Tablet 400 milliGRAM(s) Oral two times a day  carBAMazepine XR Tablet 100 milliGRAM(s) Oral daily  dextrose 5% + sodium chloride 0.45%. 1000 milliLiter(s) (75 mL/Hr) IV Continuous <Continuous>  dextrose 5% + sodium chloride 0.9%. 1000 milliLiter(s) (75 mL/Hr) IV Continuous <Continuous>  docusate sodium Liquid 100 milliGRAM(s) Oral two times a day  enoxaparin Injectable 40 milliGRAM(s) SubCutaneous every 24 hours  fluticasone propionate 50 MICROgram(s)/spray Nasal Spray 1 Spray(s) Both Nostrils two times a day  folic acid 1 milliGRAM(s) Oral daily  lactulose Syrup 10 Gram(s) Oral daily  levETIRAcetam  IVPB 1000 milliGRAM(s) IV Intermittent every 12 hours  multivitamin 1 Tablet(s) Oral daily  OLANZapine 7.5 milliGRAM(s) Oral at bedtime  pantoprazole    Tablet 40 milliGRAM(s) Oral before breakfast  phenytoin   Suspension 100 milliGRAM(s) Oral before breakfast  phenytoin   Suspension 200 milliGRAM(s) Oral at bedtime  polyethylene glycol 3350 17 Gram(s) Oral two times a day  senna 2 Tablet(s) Oral at bedtime  thiamine 100 milliGRAM(s) Oral daily  valproate sodium IVPB 500 milliGRAM(s) IV Intermittent two times a day    MEDICATIONS  (PRN):  AQUAPHOR (petrolatum Ointment) 1 Application(s) Topical two times a day PRN sacrum rash  bisacodyl Suppository 10 milliGRAM(s) Rectal daily PRN Constipation  clotrimazole 1% Cream 1 Application(s) Topical two times a day PRN rash  magnesium hydroxide Suspension 30 milliLiter(s) Oral at bedtime PRN for congestion, for constipation  nystatin Powder 1 Application(s) Topical two times a day PRN erythema of sacrum  OLANZapine Injectable 5 milliGRAM(s) IntraMuscular every 6 hours PRN agitation          PHYSICAL EXAM:  Vital Signs Last 24 Hrs  T(C): 37.2 (26 Oct 2018 06:20), Max: 37.2 (26 Oct 2018 06:20)  T(F): 98.9 (26 Oct 2018 06:20), Max: 98.9 (26 Oct 2018 06:20)  HR: 80 (26 Oct 2018 10:44) (71 - 84)  BP: 123/69 (26 Oct 2018 06:20) (109/64 - 123/69)  BP(mean): --  RR: 20 (26 Oct 2018 06:20) (17 - 20)  SpO2: 96% (26 Oct 2018 06:20) (94% - 100%)  GENERAL: lethargic  HEAD:  Atraumatic, Normocephalic  EYES: Eyes closed  NECK: Supple, No JVD  CHEST/LUNG: Clear to auscultation bilaterally; No wheeze  HEART: Regular rate and rhythm; No murmurs, rubs, or gallops  ABDOMEN: Soft, Nontender, Nondistended; Bowel sounds present  EXTREMITIES:  2+ Peripheral Pulses, No clubbing, cyanosis, or edema  Z floats to feet  PSYCH: lethargic  NEUROLOGY: lethargic    LABS:                        12.6   5.50  )-----------( 110      ( 25 Oct 2018 06:55 )             39.7     10-    145  |  105  |  9   ----------------------------<  82  4.4   |  26  |  0.45<L>    Ca    8.5      25 Oct 2018 06:55      Urinalysis Basic - ( 26 Oct 2018 11:25 )    Color: LIGHT ORANGE / Appearance: TURBID / S.012 / pH: 6.5  Gluc: NEGATIVE / Ketone: TRACE  / Bili: NEGATIVE / Urobili: NORMAL   Blood: LARGE / Protein: 70 / Nitrite: NEGATIVE   Leuk Esterase: LARGE / RBC: >50 / WBC >50   Sq Epi: OCC / Non Sq Epi: x / Bacteria: MODERATE        RADIOLOGY & ADDITIONAL TESTS:    Imaging Personally Reviewed:    Consultant(s) Notes Reviewed:      Care Discussed with Consultants/Other Providers: Patient is a 53y old  Female who presents with a chief complaint of Obstipation (26 Oct 2018 11:55)      SUBJECTIVE / OVERNIGHT EVENTS:  Patient seen with Julia - care giver at bedside.  Julia noted patient came in for constipation but is now lethargic and not eating as before- patient is NOT back to baseline.    MEDICATIONS  (STANDING):  artificial tears (preservative free) Ophthalmic Solution 1 Drop(s) Both EYES two times a day  aspirin enteric coated 81 milliGRAM(s) Oral daily  benztropine 0.5 milliGRAM(s) Oral two times a day  buDESOnide   0.5 milliGRAM(s) Respule 0.5 milliGRAM(s) Inhalation daily  carBAMazepine XR Tablet 400 milliGRAM(s) Oral two times a day  carBAMazepine XR Tablet 100 milliGRAM(s) Oral daily  dextrose 5% + sodium chloride 0.45%. 1000 milliLiter(s) (75 mL/Hr) IV Continuous <Continuous>  dextrose 5% + sodium chloride 0.9%. 1000 milliLiter(s) (75 mL/Hr) IV Continuous <Continuous>  docusate sodium Liquid 100 milliGRAM(s) Oral two times a day  enoxaparin Injectable 40 milliGRAM(s) SubCutaneous every 24 hours  fluticasone propionate 50 MICROgram(s)/spray Nasal Spray 1 Spray(s) Both Nostrils two times a day  folic acid 1 milliGRAM(s) Oral daily  lactulose Syrup 10 Gram(s) Oral daily  levETIRAcetam  IVPB 1000 milliGRAM(s) IV Intermittent every 12 hours  multivitamin 1 Tablet(s) Oral daily  OLANZapine 7.5 milliGRAM(s) Oral at bedtime  pantoprazole    Tablet 40 milliGRAM(s) Oral before breakfast  phenytoin   Suspension 100 milliGRAM(s) Oral before breakfast  phenytoin   Suspension 200 milliGRAM(s) Oral at bedtime  polyethylene glycol 3350 17 Gram(s) Oral two times a day  senna 2 Tablet(s) Oral at bedtime  thiamine 100 milliGRAM(s) Oral daily  valproate sodium IVPB 500 milliGRAM(s) IV Intermittent two times a day    MEDICATIONS  (PRN):  AQUAPHOR (petrolatum Ointment) 1 Application(s) Topical two times a day PRN sacrum rash  bisacodyl Suppository 10 milliGRAM(s) Rectal daily PRN Constipation  clotrimazole 1% Cream 1 Application(s) Topical two times a day PRN rash  magnesium hydroxide Suspension 30 milliLiter(s) Oral at bedtime PRN for congestion, for constipation  nystatin Powder 1 Application(s) Topical two times a day PRN erythema of sacrum  OLANZapine Injectable 5 milliGRAM(s) IntraMuscular every 6 hours PRN agitation          PHYSICAL EXAM:  Vital Signs Last 24 Hrs  T(C): 37.2 (26 Oct 2018 06:20), Max: 37.2 (26 Oct 2018 06:20)  T(F): 98.9 (26 Oct 2018 06:20), Max: 98.9 (26 Oct 2018 06:20)  HR: 80 (26 Oct 2018 10:44) (71 - 84)  BP: 123/69 (26 Oct 2018 06:20) (109/64 - 123/69)  BP(mean): --  RR: 20 (26 Oct 2018 06:20) (17 - 20)  SpO2: 96% (26 Oct 2018 06:20) (94% - 100%)  GENERAL: lethargic  HEAD:  Atraumatic, Normocephalic  EYES: Eyes closed  NECK: Supple, No JVD  CHEST/LUNG: poor ins effort  HEART: Regular rate and rhythm; No murmurs, rubs, or gallops  ABDOMEN: Soft, Nontender, Nondistended; Bowel sounds present  EXTREMITIES:  2+ Peripheral Pulses, No clubbing, cyanosis, or edema  Z floats to feet  PSYCH: lethargic  NEUROLOGY: lethargic    LABS:                        12.6   5.50  )-----------( 110      ( 25 Oct 2018 06:55 )             39.7     10-    145  |  105  |  9   ----------------------------<  82  4.4   |  26  |  0.45<L>    Ca    8.5      25 Oct 2018 06:55      Urinalysis Basic - ( 26 Oct 2018 11:25 )    Color: LIGHT ORANGE / Appearance: TURBID / S.012 / pH: 6.5  Gluc: NEGATIVE / Ketone: TRACE  / Bili: NEGATIVE / Urobili: NORMAL   Blood: LARGE / Protein: 70 / Nitrite: NEGATIVE   Leuk Esterase: LARGE / RBC: >50 / WBC >50   Sq Epi: OCC / Non Sq Epi: x / Bacteria: MODERATE        RADIOLOGY & ADDITIONAL TESTS:    Imaging Personally Reviewed:    Consultant(s) Notes Reviewed:      Care Discussed with Consultants/Other Providers:

## 2018-10-26 NOTE — CONSULT NOTE ADULT - SUBJECTIVE AND OBJECTIVE BOX
Patient is a 53y old  Female who presents with a chief complaint of Obstipation (26 Oct 2018 11:55)      HPI:  The patient is a 53 year old female with CP (a/w severe ID, baseline non verbal, does not ambulate, however can feed herself, follows some commands), intractable seizures who presents with wheezing, constipation and edema of LE per aid at bedside. Patient nonverbal at baseline and asleep. In ED was agitated and required sedation for lab/CT scan given lorazepam 2mg, haloperidol 2mg and 3mg.   Patient found to have hypercapnic respiratory failure on VBG after sedation and pulm was called for hypercapnia possible causing AMS, however on review pt has hx of hypercapnia on VBGs, with ABG showing chronic hypercapnic.   Patient seen asleep waking to stimuli.   In ED: Vitals 97.6; 69; 142/101; 16 100% on RA. Received lorazepam 2mg, haloperidol 2mg and 3mg. (17 Oct 2018 20:04)      PAST MEDICAL & SURGICAL HISTORY:  Intellectual disability  Constipation  Seizure disorder  Cerebral palsy  No significant past surgical history      Allergies  Topamax (Unknown)        ANTIMICROBIALS:  piperacillin/tazobactam IVPB. 3.375 every 8 hours  piperacillin/tazobactam IVPB. 3.375 once      OTHER MEDS: MEDICATIONS  (STANDING):  aspirin enteric coated 81 daily  benztropine 0.5 two times a day  bisacodyl Suppository 10 daily PRN  buDESOnide   0.5 milliGRAM(s) Respule 0.5 daily  carBAMazepine XR Tablet 400 two times a day  carBAMazepine XR Tablet 100 daily  docusate sodium Liquid 100 two times a day  enoxaparin Injectable 40 every 24 hours  lactulose Syrup 10 daily  levETIRAcetam  IVPB 1000 every 12 hours  magnesium hydroxide Suspension 30 at bedtime PRN  OLANZapine 7.5 at bedtime  OLANZapine Injectable 5 every 6 hours PRN  pantoprazole    Tablet 40 before breakfast  phenytoin   Suspension 100 before breakfast  phenytoin   Suspension 200 at bedtime  polyethylene glycol 3350 17 two times a day  senna 2 at bedtime  valproate sodium IVPB 500 two times a day      SOCIAL HISTORY:     Tobacco: never  	EtOH: never  	Illicit drugs: never  Lives in group home.    FAMILY HISTORY:  No pertinent family history in first degree relatives      REVIEW OF SYSTEMS  [  X] ROS unobtainable because:  nonverbal at baseline     Vital Signs Last 24 Hrs  T(F): 98.9 (10-26-18 @ 06:20), Max: 98.9 (10-26-18 @ 06:20)    Vital Signs Last 24 Hrs  HR: 80 (10-26-18 @ 10:44) (71 - 84)  BP: 123/69 (10-26-18 @ 06:20) (109/64 - 123/69)  RR: 20 (10-26-18 @ 06:20)  SpO2: 96% (10-26-18 @ 06:20) (94% - 100%)  Wt(kg): --    PHYSICAL EXAM:  General: non-toxic  HEAD/EYES: anicteric, PERRL  ENT:  supple  Cardiovascular:   S1, S2  Respiratory:  clear bilaterally  GI:  soft, non-tender, normal bowel sounds  :  no CVA tenderness   Musculoskeletal:  no synovitis  Neurologic:  grossly non-focal  Skin:  no rash  Lymph: no lymphadenopathy  Psychiatric:  appropriate affect  Vascular:  no phlebitis    WBC Count: 5.50 K/uL (10-25 @ 06:55)  WBC Count: 4.71 K/uL (10-24 @ 06:35)  WBC Count: 4.96 K/uL (10-23 @ 06:40)  WBC Count: 3.75 K/uL (10-22 @ 07:10)                          12.6   5.50  )-----------( 110      ( 25 Oct 2018 06:55 )             39.7       10-25    145  |  105  |  9   ----------------------------<  82  4.4   |  26  |  0.45<L>    Ca    8.5      25 Oct 2018 06:55        Urinalysis Basic - ( 26 Oct 2018 11:25 )    Color: LIGHT ORANGE / Appearance: TURBID / S.012 / pH: 6.5  Gluc: NEGATIVE / Ketone: TRACE  / Bili: NEGATIVE / Urobili: NORMAL   Blood: LARGE / Protein: 70 / Nitrite: NEGATIVE   Leuk Esterase: LARGE / RBC: >50 / WBC >50   Sq Epi: OCC / Non Sq Epi: x / Bacteria: MODERATE        MICROBIOLOGY:  Culture - Urine (10.21.18 @ 15:45)    Culture - Urine:   Culture grew 3 or more types of organisms which indicate  collection contamination; consider recollection only if  clinically indicated.    Specimen Source: URINE CATHETER            RADIOLOGY:  < from: Xray Chest 1 View- PORTABLE-Routine (10.21.18 @ 13:32) >  IMPRESSION:   Technically limited study.  Small left pleural effusion.    < end of copied text >    imaging below personally reviewed Patient is a 53y old  Female who presents with a chief complaint of Obstipation (26 Oct 2018 11:55)      HPI:  The patient is a 53 year old female with CP (a/w severe ID, baseline non verbal, does not ambulate, however can feed herself, follows some commands), intractable seizures who presents with wheezing, constipation and edema of LE per aid at bedside. Patient nonverbal at baseline and asleep. In ED was agitated and required sedation for lab/CT scan given lorazepam 2mg, haloperidol 2mg and 3mg.   Patient found to have hypercapnic respiratory failure on VBG after sedation and pulm was called for hypercapnia possible causing AMS, however on review pt has hx of hypercapnia on VBGs, with ABG showing chronic hypercapnic.   Patient seen asleep waking to stimuli.   In ED: Vitals 97.6; 69; 142/101; 16 100% on RA. Received lorazepam 2mg, haloperidol 2mg and 3mg. (17 Oct 2018 20:04)    Above reviewed. Respiratory issues have resolved. Patient has not been at her baseline mental status and had hyporthermia overnight to 93-94. UA was sent and positive.   ID consulted to asssist with workup and management       PAST MEDICAL & SURGICAL HISTORY:  Intellectual disability  Constipation  Seizure disorder  Cerebral palsy  No significant past surgical history      Allergies  Topamax (Unknown)        ANTIMICROBIALS:  piperacillin/tazobactam IVPB. 3.375 every 8 hours  piperacillin/tazobactam IVPB. 3.375 once      OTHER MEDS: MEDICATIONS  (STANDING):  aspirin enteric coated 81 daily  benztropine 0.5 two times a day  bisacodyl Suppository 10 daily PRN  buDESOnide   0.5 milliGRAM(s) Respule 0.5 daily  carBAMazepine XR Tablet 400 two times a day  carBAMazepine XR Tablet 100 daily  docusate sodium Liquid 100 two times a day  enoxaparin Injectable 40 every 24 hours  lactulose Syrup 10 daily  levETIRAcetam  IVPB 1000 every 12 hours  magnesium hydroxide Suspension 30 at bedtime PRN  OLANZapine 7.5 at bedtime  OLANZapine Injectable 5 every 6 hours PRN  pantoprazole    Tablet 40 before breakfast  phenytoin   Suspension 100 before breakfast  phenytoin   Suspension 200 at bedtime  polyethylene glycol 3350 17 two times a day  senna 2 at bedtime  valproate sodium IVPB 500 two times a day      SOCIAL HISTORY:     Tobacco: never  	EtOH: never  	Illicit drugs: never  Lives in group home.    FAMILY HISTORY:  No pertinent family history in first degree relatives      REVIEW OF SYSTEMS  [  X] ROS unobtainable because:  nonverbal at baseline     Vital Signs Last 24 Hrs  T(F): 98.9 (10-26-18 @ 06:20), Max: 98.9 (10-26-18 @ 06:20)    Vital Signs Last 24 Hrs  HR: 80 (10-26-18 @ 10:44) (71 - 84)  BP: 123/69 (10-26-18 @ 06:20) (109/64 - 123/69)  RR: 20 (10-26-18 @ 06:20)  SpO2: 96% (10-26-18 @ 06:20) (94% - 100%)  Wt(kg): --    PHYSICAL EXAM:  General: no acute distress, nonverbal, not following commands  HEAD/EYES: anicteric, PERRL  ENT:  supple, poor dentition   Cardiovascular:   S1, S2  Respiratory:  clear bilaterally  GI:  soft, non-tender, normal bowel sounds  :  no CVA tenderness   Musculoskeletal:  no synovitis  Neurologic:  nonverbal, doesn't follow commands, opens eyes when name called   Skin:  no rash  Lymph: no lymphadenopathy  Psychiatric:  appropriate affect  Vascular:  no phlebitis    WBC Count: 5.50 K/uL (10-25 @ 06:55)  WBC Count: 4.71 K/uL (10-24 @ 06:35)  WBC Count: 4.96 K/uL (10-23 @ 06:40)  WBC Count: 3.75 K/uL (10-22 @ 07:10)                          12.6   5.50  )-----------( 110      ( 25 Oct 2018 06:55 )             39.7       10-25    145  |  105  |  9   ----------------------------<  82  4.4   |  26  |  0.45<L>    Ca    8.5      25 Oct 2018 06:55        Urinalysis Basic - ( 26 Oct 2018 11:25 )    Color: LIGHT ORANGE / Appearance: TURBID / S.012 / pH: 6.5  Gluc: NEGATIVE / Ketone: TRACE  / Bili: NEGATIVE / Urobili: NORMAL   Blood: LARGE / Protein: 70 / Nitrite: NEGATIVE   Leuk Esterase: LARGE / RBC: >50 / WBC >50   Sq Epi: OCC / Non Sq Epi: x / Bacteria: MODERATE        MICROBIOLOGY:  Culture - Urine (10.21.18 @ 15:45)    Culture - Urine:   Culture grew 3 or more types of organisms which indicate  collection contamination; consider recollection only if  clinically indicated.    Specimen Source: URINE CATHETER        RADIOLOGY:  < from: Xray Chest 1 View- PORTABLE-Urgent (10.26.18 @ 12:52) >    IMPRESSION: No new focal opacity.     < end of copied text >    < from: Xray Chest 1 View- PORTABLE-Routine (10.21.18 @ 13:32) >  IMPRESSION:   Technically limited study.  Small left pleural effusion.    < end of copied text >    imaging below personally reviewed Patient is a 53y old  Female who presents with a chief complaint of Obstipation (26 Oct 2018 11:55)      HPI:  The patient is a 53 year old female with CP (a/w severe ID, baseline non verbal, does not ambulate, however can feed herself, follows some commands), intractable seizures who presents with wheezing, constipation and edema of LE per aid at bedside. Patient nonverbal at baseline and asleep. In ED was agitated and required sedation for lab/CT scan given lorazepam 2mg, haloperidol 2mg and 3mg.   Patient found to have hypercapnic respiratory failure on VBG after sedation and pulm was called for hypercapnia possible causing AMS, however on review pt has hx of hypercapnia on VBGs, with ABG showing chronic hypercapnic.   Patient seen asleep waking to stimuli.   In ED: Vitals 97.6; 69; 142/101; 16 100% on RA. Received lorazepam 2mg, haloperidol 2mg and 3mg. (17 Oct 2018 20:04)    Above reviewed. Respiratory issues have resolved. Patient has not been at her baseline mental status and had hyporthermia overnight to 93-94. UA was sent and positive.   ID consulted to asssist with workup and management       PAST MEDICAL & SURGICAL HISTORY:  Intellectual disability  Constipation  Seizure disorder  Cerebral palsy  No significant past surgical history      Allergies  Topamax (Unknown)        ANTIMICROBIALS:  piperacillin/tazobactam IVPB. 3.375 every 8 hours  piperacillin/tazobactam IVPB. 3.375 once      OTHER MEDS: MEDICATIONS  (STANDING):  aspirin enteric coated 81 daily  benztropine 0.5 two times a day  bisacodyl Suppository 10 daily PRN  buDESOnide   0.5 milliGRAM(s) Respule 0.5 daily  carBAMazepine XR Tablet 400 two times a day  carBAMazepine XR Tablet 100 daily  docusate sodium Liquid 100 two times a day  enoxaparin Injectable 40 every 24 hours  lactulose Syrup 10 daily  levETIRAcetam  IVPB 1000 every 12 hours  magnesium hydroxide Suspension 30 at bedtime PRN  OLANZapine 7.5 at bedtime  OLANZapine Injectable 5 every 6 hours PRN  pantoprazole    Tablet 40 before breakfast  phenytoin   Suspension 100 before breakfast  phenytoin   Suspension 200 at bedtime  polyethylene glycol 3350 17 two times a day  senna 2 at bedtime  valproate sodium IVPB 500 two times a day      SOCIAL HISTORY:     Lives in group home, nonverbal, no documented h/o smoking, alcohol or drug abuse    FAMILY HISTORY:  unable to obtain as pt is nonverbal      REVIEW OF SYSTEMS  [  X] ROS unobtainable because:  nonverbal at baseline     Vital Signs Last 24 Hrs  T(F): 98.9 (10-26-18 @ 06:20), Max: 98.9 (10-26-18 @ 06:20)    Vital Signs Last 24 Hrs  HR: 80 (10-26-18 @ 10:44) (71 - 84)  BP: 123/69 (10-26-18 @ 06:20) (109/64 - 123/69)  RR: 20 (10-26-18 @ 06:20)  SpO2: 96% (10-26-18 @ 06:20) (94% - 100%)  Wt(kg): --    PHYSICAL EXAM:  General: no acute distress, nonverbal, not following commands  HEAD/EYES: anicteric, PERRL  ENT:  supple, poor dentition   Cardiovascular:   S1, S2  Respiratory:  clear bilaterally  GI:  soft, non-tender, normal bowel sounds  :  no CVA tenderness   Musculoskeletal:  no synovitis  Neurologic:  nonverbal, doesn't follow commands, opens eyes when name called   Skin:  no rash  Lymph: no lymphadenopathy  Psychiatric:  appropriate affect  Vascular:  no phlebitis    WBC Count: 5.50 K/uL (10-25 @ 06:55)  WBC Count: 4.71 K/uL (10-24 @ 06:35)  WBC Count: 4.96 K/uL (10-23 @ 06:40)  WBC Count: 3.75 K/uL (10-22 @ 07:10)                          12.6   5.50  )-----------( 110      ( 25 Oct 2018 06:55 )             39.7       10-25    145  |  105  |  9   ----------------------------<  82  4.4   |  26  |  0.45<L>    Ca    8.5      25 Oct 2018 06:55        Urinalysis Basic - ( 26 Oct 2018 11:25 )    Color: LIGHT ORANGE / Appearance: TURBID / S.012 / pH: 6.5  Gluc: NEGATIVE / Ketone: TRACE  / Bili: NEGATIVE / Urobili: NORMAL   Blood: LARGE / Protein: 70 / Nitrite: NEGATIVE   Leuk Esterase: LARGE / RBC: >50 / WBC >50   Sq Epi: OCC / Non Sq Epi: x / Bacteria: MODERATE        MICROBIOLOGY:  Culture - Urine (10.21.18 @ 15:45)    Culture - Urine:   Culture grew 3 or more types of organisms which indicate  collection contamination; consider recollection only if  clinically indicated.    Specimen Source: URINE CATHETER        RADIOLOGY:  < from: Xray Chest 1 View- PORTABLE-Urgent (10.26.18 @ 12:52) >    IMPRESSION: No new focal opacity.     < end of copied text >    < from: Xray Chest 1 View- PORTABLE-Routine (10.21.18 @ 13:32) >  IMPRESSION:   Technically limited study.  Small left pleural effusion.    < end of copied text >    imaging below personally reviewed

## 2018-10-26 NOTE — PROGRESS NOTE BEHAVIORAL HEALTH - OTHER
more alert pt. non-verbal at baseline unable to assess, non verbal at baseline non-verbal unable to assess limited unable to assess, non-verbal Not assessed, pt. in bed

## 2018-10-26 NOTE — PROGRESS NOTE BEHAVIORAL HEALTH - NSBHCONSULTFOLLOWAFTERCARE_PSY_A_CORE FT
Follow up with the psychiatrist at   Additional referrals:  [facility] outpt. walk in clinic : [phone] (9AM-7PM)  [facility] Outpatient clinic: [phone]
Follow up with the psychiatrist at  for medication managment  Additional referrals:  [facility] outpt. walk in clinic : [phone] (9AM-7PM)  [facility] Outpatient clinic: [phone]

## 2018-10-26 NOTE — PROGRESS NOTE BEHAVIORAL HEALTH - RISK ASSESSMENT
Low. No hx of SA known, pt non verbal, unable to amputate, domiciled in group home with frequent supervision.  Risk factors: susan delirious    Not an acute risk of harm to self or others at this time
Low. No hx of SA known, pt non verbal, unable to amputate, domiciled in group home with frequent supervision.  Risk factors: susan delirious    Not an acute risk of harm to self or others at this time and does not need inpatient admission.

## 2018-10-26 NOTE — PROGRESS NOTE BEHAVIORAL HEALTH - NSBHCHARTREVIEWVS_PSY_A_CORE FT
ICU Vital Signs Last 24 Hrs  T(C): 37.2 (26 Oct 2018 06:20), Max: 37.2 (26 Oct 2018 06:20)  T(F): 98.9 (26 Oct 2018 06:20), Max: 98.9 (26 Oct 2018 06:20)  HR: 80 (26 Oct 2018 10:44) (71 - 84)  BP: 123/69 (26 Oct 2018 06:20) (109/64 - 123/69)  BP(mean): --  ABP: --  ABP(mean): --  RR: 20 (26 Oct 2018 06:20) (17 - 20)  SpO2: 96% (26 Oct 2018 06:20) (94% - 100%)
ICU Vital Signs Last 24 Hrs  T(C): 36.7 (25 Oct 2018 04:36), Max: 36.7 (24 Oct 2018 14:39)  T(F): 98 (25 Oct 2018 04:36), Max: 98 (24 Oct 2018 14:39)  HR: 67 (25 Oct 2018 04:36) (63 - 68)  BP: 127/85 (25 Oct 2018 04:36) (125/84 - 135/85)  BP(mean): --  ABP: --  ABP(mean): --  RR: 20 (25 Oct 2018 04:36) (18 - 20)  SpO2: 93% (25 Oct 2018 04:36) (92% - 93%)

## 2018-10-27 LAB
AMMONIA BLD-MCNC: 84 UMOL/L — HIGH (ref 11–55)
BASOPHILS # BLD AUTO: 0.01 K/UL — SIGNIFICANT CHANGE UP (ref 0–0.2)
BASOPHILS NFR BLD AUTO: 0.3 % — SIGNIFICANT CHANGE UP (ref 0–2)
EOSINOPHIL # BLD AUTO: 0.22 K/UL — SIGNIFICANT CHANGE UP (ref 0–0.5)
EOSINOPHIL NFR BLD AUTO: 5.9 % — SIGNIFICANT CHANGE UP (ref 0–6)
HCT VFR BLD CALC: 39.8 % — SIGNIFICANT CHANGE UP (ref 34.5–45)
HGB BLD-MCNC: 12.7 G/DL — SIGNIFICANT CHANGE UP (ref 11.5–15.5)
IMM GRANULOCYTES # BLD AUTO: 0.04 # — SIGNIFICANT CHANGE UP
IMM GRANULOCYTES NFR BLD AUTO: 1.1 % — SIGNIFICANT CHANGE UP (ref 0–1.5)
LYMPHOCYTES # BLD AUTO: 1.19 K/UL — SIGNIFICANT CHANGE UP (ref 1–3.3)
LYMPHOCYTES # BLD AUTO: 31.6 % — SIGNIFICANT CHANGE UP (ref 13–44)
MCHC RBC-ENTMCNC: 31.9 % — LOW (ref 32–36)
MCHC RBC-ENTMCNC: 32.6 PG — SIGNIFICANT CHANGE UP (ref 27–34)
MCV RBC AUTO: 102.3 FL — HIGH (ref 80–100)
MONOCYTES # BLD AUTO: 0.6 K/UL — SIGNIFICANT CHANGE UP (ref 0–0.9)
MONOCYTES NFR BLD AUTO: 16 % — HIGH (ref 2–14)
NEUTROPHILS # BLD AUTO: 1.7 K/UL — LOW (ref 1.8–7.4)
NEUTROPHILS NFR BLD AUTO: 45.1 % — SIGNIFICANT CHANGE UP (ref 43–77)
NRBC # FLD: 0 — SIGNIFICANT CHANGE UP
PLATELET # BLD AUTO: 103 K/UL — LOW (ref 150–400)
PMV BLD: 10.7 FL — SIGNIFICANT CHANGE UP (ref 7–13)
RBC # BLD: 3.89 M/UL — SIGNIFICANT CHANGE UP (ref 3.8–5.2)
RBC # FLD: 14.6 % — HIGH (ref 10.3–14.5)
SPECIMEN SOURCE: SIGNIFICANT CHANGE UP
WBC # BLD: 3.76 K/UL — LOW (ref 3.8–10.5)
WBC # FLD AUTO: 3.76 K/UL — LOW (ref 3.8–10.5)

## 2018-10-27 PROCEDURE — 99233 SBSQ HOSP IP/OBS HIGH 50: CPT

## 2018-10-27 RX ADMIN — Medication 1 SPRAY(S): at 18:03

## 2018-10-27 RX ADMIN — LEVETIRACETAM 400 MILLIGRAM(S): 250 TABLET, FILM COATED ORAL at 07:01

## 2018-10-27 RX ADMIN — ENOXAPARIN SODIUM 40 MILLIGRAM(S): 100 INJECTION SUBCUTANEOUS at 18:05

## 2018-10-27 RX ADMIN — Medication 10 MILLIGRAM(S): at 06:20

## 2018-10-27 RX ADMIN — SODIUM CHLORIDE 75 MILLILITER(S): 9 INJECTION, SOLUTION INTRAVENOUS at 18:04

## 2018-10-27 RX ADMIN — Medication 108 MILLIGRAM(S): at 22:40

## 2018-10-27 RX ADMIN — PIPERACILLIN AND TAZOBACTAM 25 GRAM(S): 4; .5 INJECTION, POWDER, LYOPHILIZED, FOR SOLUTION INTRAVENOUS at 07:15

## 2018-10-27 RX ADMIN — Medication 1 SPRAY(S): at 07:02

## 2018-10-27 RX ADMIN — PIPERACILLIN AND TAZOBACTAM 25 GRAM(S): 4; .5 INJECTION, POWDER, LYOPHILIZED, FOR SOLUTION INTRAVENOUS at 23:44

## 2018-10-27 RX ADMIN — Medication 1 DROP(S): at 07:02

## 2018-10-27 RX ADMIN — Medication 27.5 MILLIGRAM(S): at 04:41

## 2018-10-27 RX ADMIN — PIPERACILLIN AND TAZOBACTAM 25 GRAM(S): 4; .5 INJECTION, POWDER, LYOPHILIZED, FOR SOLUTION INTRAVENOUS at 13:05

## 2018-10-27 RX ADMIN — Medication 1 DROP(S): at 18:06

## 2018-10-27 RX ADMIN — Medication 27.5 MILLIGRAM(S): at 18:04

## 2018-10-27 RX ADMIN — Medication 0.5 MILLIGRAM(S): at 10:03

## 2018-10-27 RX ADMIN — LEVETIRACETAM 400 MILLIGRAM(S): 250 TABLET, FILM COATED ORAL at 18:04

## 2018-10-27 NOTE — CHART NOTE - NSCHARTNOTEFT_GEN_A_CORE
Called by Neurology on call ([phone]) who states that Valproic acid can contribute to elevated ammonia levels.  Recommends holding Valproic acid in setting of elevated ammonia level, check levels in AM and when normalizes can resume.

## 2018-10-27 NOTE — PROGRESS NOTE ADULT - PROBLEM SELECTOR PLAN 3
Patient on several antiepileptics due to recent MICU for status epilepticus.   No signs of seizure activity  -continue home divalproex, phenytoin, levetiracetam, carbamazepine ( missed 2 doses of carbamazepine) - IV formulation while she's not tolerating PO intake.  - check the levels of Phenytoin, valproic acid, carbamazepine.  - will follow up with neurology for recommendations.

## 2018-10-27 NOTE — PROGRESS NOTE ADULT - PROBLEM SELECTOR PLAN 1
UTI causing worsening encephalopathy.-toxic encephalopathy on top of prior metabolic encephalopathy?    Psychiatry consult appreciated.  Zyprexa changed to IM while patient is refusing PO meds. UTI causing worsening encephalopathy.  Ct of chest c/w asp PNA in b/l LL L>R  -toxic encephalopathy on top of prior metabolic encephalopathy?    Psychiatry consult appreciated.  Zyprexa changed to IM while patient is refusing PO meds.

## 2018-10-27 NOTE — PROGRESS NOTE ADULT - SUBJECTIVE AND OBJECTIVE BOX
Patient is a 53y old  Female who presents with a chief complaint of Obstipation (27 Oct 2018 09:02)      SUBJECTIVE / OVERNIGHT EVENTS:  Patient seen with Dax Maria at bedside.  Patient is alert and moving her arms.  Eyes open. lips a little dry.  Patient is non-verbal    MEDICATIONS  (STANDING):  artificial tears (preservative free) Ophthalmic Solution 1 Drop(s) Both EYES two times a day  aspirin enteric coated 81 milliGRAM(s) Oral daily  benztropine 0.5 milliGRAM(s) Oral two times a day  buDESOnide   0.5 milliGRAM(s) Respule 0.5 milliGRAM(s) Inhalation daily  carBAMazepine XR Tablet 400 milliGRAM(s) Oral two times a day  carBAMazepine XR Tablet 100 milliGRAM(s) Oral daily  dextrose 5% + sodium chloride 0.45%. 1000 milliLiter(s) (75 mL/Hr) IV Continuous <Continuous>  dextrose 5% + sodium chloride 0.9%. 1000 milliLiter(s) (75 mL/Hr) IV Continuous <Continuous>  docusate sodium Liquid 100 milliGRAM(s) Oral two times a day  enoxaparin Injectable 40 milliGRAM(s) SubCutaneous every 24 hours  fluticasone propionate 50 MICROgram(s)/spray Nasal Spray 1 Spray(s) Both Nostrils two times a day  folic acid 1 milliGRAM(s) Oral daily  lactulose Syrup 10 Gram(s) Oral daily  levETIRAcetam  IVPB 1000 milliGRAM(s) IV Intermittent every 12 hours  multivitamin 1 Tablet(s) Oral daily  OLANZapine 7.5 milliGRAM(s) Oral at bedtime  pantoprazole    Tablet 40 milliGRAM(s) Oral before breakfast  phenytoin   Suspension 100 milliGRAM(s) Oral before breakfast  phenytoin   Suspension 200 milliGRAM(s) Oral at bedtime  piperacillin/tazobactam IVPB. 3.375 Gram(s) IV Intermittent every 8 hours  piperacillin/tazobactam IVPB. 3.375 Gram(s) IV Intermittent once  polyethylene glycol 3350 17 Gram(s) Oral two times a day  senna 2 Tablet(s) Oral at bedtime  thiamine 100 milliGRAM(s) Oral daily  valproate sodium IVPB 500 milliGRAM(s) IV Intermittent two times a day    MEDICATIONS  (PRN):  AQUAPHOR (petrolatum Ointment) 1 Application(s) Topical two times a day PRN sacrum rash  bisacodyl Suppository 10 milliGRAM(s) Rectal daily PRN Constipation  clotrimazole 1% Cream 1 Application(s) Topical two times a day PRN rash  magnesium hydroxide Suspension 30 milliLiter(s) Oral at bedtime PRN for congestion, for constipation  nystatin Powder 1 Application(s) Topical two times a day PRN erythema of sacrum  OLANZapine Injectable 5 milliGRAM(s) IntraMuscular every 6 hours PRN agitation        CAPILLARY BLOOD GLUCOSE      POCT Blood Glucose.: 101 mg/dL (27 Oct 2018 07:08)    I&O's Summary      PHYSICAL EXAM:  Vital Signs Last 24 Hrs  T(C): 36.5 (27 Oct 2018 06:20), Max: 36.6 (26 Oct 2018 15:07)  T(F): 97.7 (27 Oct 2018 06:20), Max: 97.8 (26 Oct 2018 15:07)  HR: 75 (27 Oct 2018 06:20) (70 - 82)  BP: 108/65 (27 Oct 2018 06:20) (102/64 - 110/70)  BP(mean): --  RR: 19 (27 Oct 2018 06:20) (19 - 20)  SpO2: 98% (27 Oct 2018 06:20) (98% - 98%)  GENERAL: NAD, well-developed- non verbal  HEAD:  Atraumatic, Normocephalic  EYES: EOMI, PERRLA, conjunctiva and sclera clear  NECK: Supple, No JVD  CHEST/LUNG: Clear to auscultation bilaterally; No wheeze  HEART: Regular rate and rhythm; No murmurs, rubs, or gallops  ABDOMEN: Soft, Nontender, Nondistended; Bowel sounds present  EXTREMITIES:  2+ Peripheral Pulses, No clubbing, cyanosis, or edema  PSYCH: Alert -  NEUROLOGY: non-focal  SKIN: No rashes or lesions    LABS:    Urinalysis Basic - ( 26 Oct 2018 11:25 )    Color: LIGHT ORANGE / Appearance: TURBID / S.012 / pH: 6.5  Gluc: NEGATIVE / Ketone: TRACE  / Bili: NEGATIVE / Urobili: NORMAL   Blood: LARGE / Protein: 70 / Nitrite: NEGATIVE   Leuk Esterase: LARGE / RBC: >50 / WBC >50   Sq Epi: OCC / Non Sq Epi: x / Bacteria: MODERATE        RADIOLOGY & ADDITIONAL TESTS:  < from: Xray Chest 1 View- PORTABLE-Urgent (10.26.18 @ 12:52) >  IMPRESSION: No new focal opacity.           Imaging Personally Reviewed:    Consultant(s) Notes Reviewed:      Care Discussed with Consultants/Other Providers:

## 2018-10-27 NOTE — PROGRESS NOTE ADULT - SUBJECTIVE AND OBJECTIVE BOX
Neurology Progress    NAYANA JACQUES53yFemale    HPI:  The patient is a 53 year old female with CP (a/w severe ID, baseline non verbal, does not ambulate, however can feed herself, follows some commands), intractable seizures who presents with wheezing, constipation and edema of LE per aid at bedside. Patient nonverbal at baseline and asleep. In ED was agitated and required sedation for lab/CT scan given lorazepam 2mg, haloperidol 2mg and 3mg.   Patient found to have hypercapnic respiratory failure on VBG after sedation and pulm was called for hypercapnia possible causing AMS, however on review pt has hx of hypercapnia on VBGs, with ABG showing chronic hypercapnic.   Patient seen asleep waking to stimuli.   In ED: Vitals 97.6; 69; 142/101; 16 100% on RA. Received lorazepam 2mg, haloperidol 2mg and 3mg. (17 Oct 2018 20:04)      Past Medical History  Intellectual disability  Constipation  Seizure disorder  Cerebral palsy      Past Surgical History  No significant past surgical history      MEDICATIONS    AQUAPHOR (petrolatum Ointment) 1 Application(s) Topical two times a day PRN  artificial tears (preservative free) Ophthalmic Solution 1 Drop(s) Both EYES two times a day  aspirin enteric coated 81 milliGRAM(s) Oral daily  benztropine 0.5 milliGRAM(s) Oral two times a day  bisacodyl Suppository 10 milliGRAM(s) Rectal daily PRN  buDESOnide   0.5 milliGRAM(s) Respule 0.5 milliGRAM(s) Inhalation daily  carBAMazepine XR Tablet 400 milliGRAM(s) Oral two times a day  carBAMazepine XR Tablet 100 milliGRAM(s) Oral daily  clotrimazole 1% Cream 1 Application(s) Topical two times a day PRN  dextrose 5% + sodium chloride 0.45%. 1000 milliLiter(s) IV Continuous <Continuous>  dextrose 5% + sodium chloride 0.9%. 1000 milliLiter(s) IV Continuous <Continuous>  docusate sodium Liquid 100 milliGRAM(s) Oral two times a day  enoxaparin Injectable 40 milliGRAM(s) SubCutaneous every 24 hours  fluticasone propionate 50 MICROgram(s)/spray Nasal Spray 1 Spray(s) Both Nostrils two times a day  folic acid 1 milliGRAM(s) Oral daily  lactulose Syrup 10 Gram(s) Oral daily  levETIRAcetam  IVPB 1000 milliGRAM(s) IV Intermittent every 12 hours  magnesium hydroxide Suspension 30 milliLiter(s) Oral at bedtime PRN  multivitamin 1 Tablet(s) Oral daily  nystatin Powder 1 Application(s) Topical two times a day PRN  OLANZapine 7.5 milliGRAM(s) Oral at bedtime  OLANZapine Injectable 5 milliGRAM(s) IntraMuscular every 6 hours PRN  pantoprazole    Tablet 40 milliGRAM(s) Oral before breakfast  phenytoin   Suspension 100 milliGRAM(s) Oral before breakfast  phenytoin   Suspension 200 milliGRAM(s) Oral at bedtime  piperacillin/tazobactam IVPB. 3.375 Gram(s) IV Intermittent every 8 hours  piperacillin/tazobactam IVPB. 3.375 Gram(s) IV Intermittent once  polyethylene glycol 3350 17 Gram(s) Oral two times a day  senna 2 Tablet(s) Oral at bedtime  thiamine 100 milliGRAM(s) Oral daily  valproate sodium IVPB 500 milliGRAM(s) IV Intermittent two times a day         Family history: No history of dementia, strokes, or seizures   FAMILY HISTORY:  No pertinent family history in first degree relatives    SOCIAL HISTORY -- No history of tobacco or alcohol use     Allergies    Topamax (Unknown)    Intolerances            Vital Signs Last 24 Hrs  T(C): 36.5 (27 Oct 2018 06:20), Max: 36.6 (26 Oct 2018 15:07)  T(F): 97.7 (27 Oct 2018 06:20), Max: 97.8 (26 Oct 2018 15:07)  HR: 75 (27 Oct 2018 06:20) (70 - 82)  BP: 108/65 (27 Oct 2018 06:20) (102/64 - 110/70)  BP(mean): --  RR: 19 (27 Oct 2018 06:20) (19 - 20)  SpO2: 98% (27 Oct 2018 06:20) (98% - 98%)        On Neurological Examination:    Mental Status - Patient is alert, awake,  speech non verbal    Cranial Nerves - Extraocular muscle intact  WINSTON Facial symmetry Tongue midline, CnV1to V3 intact gross hearing intact      Motor Exam - spastic quad      Sensory    withdraw    GENERAL Exam:     Nontoxic , No Acute Distress   	  HEENT:  normocephalic, atraumatic  		  LUNGS:	Clear bilaterally  No Wheeze      VASCULAR: no carotid brui  	  HEART:	 Normal S1S2   No murmur RRR        	  MUSCULOSKELETAL: Normal Range of Motion  	   SKIN:      Normal   No Ecchymosis               LABS:    Urinalysis Basic - ( 26 Oct 2018 11:25 )    Color: LIGHT ORANGE / Appearance: TURBID / S.012 / pH: 6.5  Gluc: NEGATIVE / Ketone: TRACE  / Bili: NEGATIVE / Urobili: NORMAL   Blood: LARGE / Protein: 70 / Nitrite: NEGATIVE   Leuk Esterase: LARGE / RBC: >50 / WBC >50   Sq Epi: OCC / Non Sq Epi: x / Bacteria: MODERATE            Hemoglobin A1C:       Vitamin B12         RADIOLOGY    EKG                schoenberg

## 2018-10-27 NOTE — PROGRESS NOTE ADULT - ATTENDING COMMENTS
f/u Ct of chest  f/u s/s eval  f/u ID  Id input appreciated. f/u Ct of chest  f/u labs- Pending for today  f/u s/s eval  f/u ID  Id input appreciated. Ct of chest- c/w Asp PNA L>R  f/u labs- Pending for today  f/u s/s eval  f/u ID  Id input appreciated. Ct of chest- c/w Asp PNA L>R  f/u labs- Pending for today  f/u s/s eval  f/u ID  Id input appreciated.    10/27/18  Addendum  Neurology will see patient ree elevated Amonia level 84 and NPO status with epileptic meds.  c/w iv kepra and valproic acid.  Neurology will see patient again.

## 2018-10-27 NOTE — PROGRESS NOTE ADULT - ASSESSMENT
53F admitted with acute metabolic encephalopathy complicated by severe constipation, chronic hypercarbia, cerebral palsy - non-verbal, bipolar/psychosis, epilepsy.  Patient had hypothermia overnight.  -warming blanket started.  Ua c/w UTI, cxr- clear 10/26. Zosyn started for UTI causing worsening encephalopathy.-toxic encephalopathy on top of prior metabolic encephalopathy?  CT of chest ord to r/o Asp PNA - Pending.    Functional Quad  Dysphagia    < from: Xray Chest 1 View- PORTABLE-Urgent (10.26.18 @ 12:52) >  IMPRESSION: No new focal opacity.     < end of copied text > 53F admitted with acute metabolic encephalopathy complicated by severe constipation, chronic hypercarbia, cerebral palsy - non-verbal, bipolar/psychosis, epilepsy.  Patient had hypothermia overnight.  -warming blanket started.  Ua c/w UTI, cxr- clear 10/26. Zosyn started for UTI causing worsening encephalopathy.-toxic encephalopathy on top of prior metabolic encephalopathy?  CT of chest ord to r/o Asp PNA - b/l lower lobe opacities susp for PNA - d/w rad    Functional Quad  Dysphagia    < from: Xray Chest 1 View- PORTABLE-Urgent (10.26.18 @ 12:52) >  IMPRESSION: No new focal opacity.     < end of copied text >

## 2018-10-28 LAB
-  AMIKACIN: SIGNIFICANT CHANGE UP
-  AMPICILLIN/SULBACTAM: SIGNIFICANT CHANGE UP
-  AMPICILLIN: SIGNIFICANT CHANGE UP
-  AZTREONAM: SIGNIFICANT CHANGE UP
-  CEFAZOLIN: SIGNIFICANT CHANGE UP
-  CEFEPIME: SIGNIFICANT CHANGE UP
-  CEFOXITIN: SIGNIFICANT CHANGE UP
-  CEFTAZIDIME: SIGNIFICANT CHANGE UP
-  CEFTRIAXONE: SIGNIFICANT CHANGE UP
-  CIPROFLOXACIN: SIGNIFICANT CHANGE UP
-  ERTAPENEM: SIGNIFICANT CHANGE UP
-  GENTAMICIN: SIGNIFICANT CHANGE UP
-  IMIPENEM: SIGNIFICANT CHANGE UP
-  LEVOFLOXACIN: SIGNIFICANT CHANGE UP
-  MEROPENEM: SIGNIFICANT CHANGE UP
-  NITROFURANTOIN: SIGNIFICANT CHANGE UP
-  PIPERACILLIN/TAZOBACTAM: SIGNIFICANT CHANGE UP
-  TIGECYCLINE: SIGNIFICANT CHANGE UP
-  TOBRAMYCIN: SIGNIFICANT CHANGE UP
-  TRIMETHOPRIM/SULFAMETHOXAZOLE: SIGNIFICANT CHANGE UP
AMMONIA BLD-MCNC: 54 UMOL/L — SIGNIFICANT CHANGE UP (ref 11–55)
BACTERIA UR CULT: SIGNIFICANT CHANGE UP
BUN SERPL-MCNC: 3 MG/DL — LOW (ref 7–23)
CALCIUM SERPL-MCNC: 8.9 MG/DL — SIGNIFICANT CHANGE UP (ref 8.4–10.5)
CARBAMAZEPINE SERPL-MCNC: < 0.1 UG/ML — LOW (ref 4–12)
CHLORIDE SERPL-SCNC: 102 MMOL/L — SIGNIFICANT CHANGE UP (ref 98–107)
CO2 SERPL-SCNC: 32 MMOL/L — HIGH (ref 22–31)
CREAT SERPL-MCNC: 0.48 MG/DL — LOW (ref 0.5–1.3)
GLUCOSE SERPL-MCNC: 86 MG/DL — SIGNIFICANT CHANGE UP (ref 70–99)
HCT VFR BLD CALC: 37.9 % — SIGNIFICANT CHANGE UP (ref 34.5–45)
HGB BLD-MCNC: 12 G/DL — SIGNIFICANT CHANGE UP (ref 11.5–15.5)
MAGNESIUM SERPL-MCNC: 1.5 MG/DL — LOW (ref 1.6–2.6)
MCHC RBC-ENTMCNC: 31.7 % — LOW (ref 32–36)
MCHC RBC-ENTMCNC: 32.3 PG — SIGNIFICANT CHANGE UP (ref 27–34)
MCV RBC AUTO: 101.9 FL — HIGH (ref 80–100)
METHOD TYPE: SIGNIFICANT CHANGE UP
NRBC # FLD: 0 — SIGNIFICANT CHANGE UP
ORGANISM # SPEC MICROSCOPIC CNT: SIGNIFICANT CHANGE UP
ORGANISM # SPEC MICROSCOPIC CNT: SIGNIFICANT CHANGE UP
PHENYTOIN FREE SERPL-MCNC: 30.3 UG/ML — CRITICAL HIGH (ref 10–20)
PLATELET # BLD AUTO: 115 K/UL — LOW (ref 150–400)
PMV BLD: 10.6 FL — SIGNIFICANT CHANGE UP (ref 7–13)
POTASSIUM SERPL-MCNC: 3.7 MMOL/L — SIGNIFICANT CHANGE UP (ref 3.5–5.3)
POTASSIUM SERPL-SCNC: 3.7 MMOL/L — SIGNIFICANT CHANGE UP (ref 3.5–5.3)
RBC # BLD: 3.72 M/UL — LOW (ref 3.8–5.2)
RBC # FLD: 14.1 % — SIGNIFICANT CHANGE UP (ref 10.3–14.5)
SODIUM SERPL-SCNC: 146 MMOL/L — HIGH (ref 135–145)
VALPROATE SERPL-MCNC: 19.4 UG/ML — LOW (ref 50–100)
WBC # BLD: 5.01 K/UL — SIGNIFICANT CHANGE UP (ref 3.8–10.5)
WBC # FLD AUTO: 5.01 K/UL — SIGNIFICANT CHANGE UP (ref 3.8–10.5)

## 2018-10-28 PROCEDURE — 99232 SBSQ HOSP IP/OBS MODERATE 35: CPT

## 2018-10-28 RX ORDER — SODIUM CHLORIDE 9 MG/ML
1000 INJECTION, SOLUTION INTRAVENOUS
Refills: 0 | Status: DISCONTINUED | OUTPATIENT
Start: 2018-10-28 | End: 2018-10-29

## 2018-10-28 RX ORDER — LACTULOSE 10 G/15ML
200 SOLUTION ORAL ONCE
Refills: 0 | Status: COMPLETED | OUTPATIENT
Start: 2018-10-28 | End: 2018-10-28

## 2018-10-28 RX ORDER — MAGNESIUM SULFATE 500 MG/ML
2 VIAL (ML) INJECTION ONCE
Refills: 0 | Status: COMPLETED | OUTPATIENT
Start: 2018-10-28 | End: 2018-10-28

## 2018-10-28 RX ADMIN — OLANZAPINE 5 MILLIGRAM(S): 15 TABLET, FILM COATED ORAL at 13:15

## 2018-10-28 RX ADMIN — Medication 1 DROP(S): at 05:40

## 2018-10-28 RX ADMIN — SODIUM CHLORIDE 40 MILLILITER(S): 9 INJECTION, SOLUTION INTRAVENOUS at 12:06

## 2018-10-28 RX ADMIN — Medication 104 MILLIGRAM(S): at 12:35

## 2018-10-28 RX ADMIN — PIPERACILLIN AND TAZOBACTAM 25 GRAM(S): 4; .5 INJECTION, POWDER, LYOPHILIZED, FOR SOLUTION INTRAVENOUS at 22:08

## 2018-10-28 RX ADMIN — PIPERACILLIN AND TAZOBACTAM 25 GRAM(S): 4; .5 INJECTION, POWDER, LYOPHILIZED, FOR SOLUTION INTRAVENOUS at 17:22

## 2018-10-28 RX ADMIN — Medication 0.5 MILLIGRAM(S): at 10:24

## 2018-10-28 RX ADMIN — ENOXAPARIN SODIUM 40 MILLIGRAM(S): 100 INJECTION SUBCUTANEOUS at 22:08

## 2018-10-28 RX ADMIN — Medication 2 MILLIGRAM(S): at 17:25

## 2018-10-28 RX ADMIN — PIPERACILLIN AND TAZOBACTAM 25 GRAM(S): 4; .5 INJECTION, POWDER, LYOPHILIZED, FOR SOLUTION INTRAVENOUS at 05:40

## 2018-10-28 RX ADMIN — LEVETIRACETAM 400 MILLIGRAM(S): 250 TABLET, FILM COATED ORAL at 05:18

## 2018-10-28 RX ADMIN — SODIUM CHLORIDE 75 MILLILITER(S): 9 INJECTION, SOLUTION INTRAVENOUS at 00:52

## 2018-10-28 RX ADMIN — Medication 50 GRAM(S): at 22:47

## 2018-10-28 RX ADMIN — LACTULOSE 200 GRAM(S): 10 SOLUTION ORAL at 22:48

## 2018-10-28 RX ADMIN — Medication 1 SPRAY(S): at 05:40

## 2018-10-28 RX ADMIN — LEVETIRACETAM 400 MILLIGRAM(S): 250 TABLET, FILM COATED ORAL at 18:26

## 2018-10-28 NOTE — SWALLOW BEDSIDE ASSESSMENT ADULT - COMMENTS
MD orders received. Chart reviewed. Patient was received in lethargic state. Briefly arousable to verbal/tactile stimuli via opening eyes though unable to sustain adequate level of alertness to safely administer PO trials at this time. As per discussion with Tele PA (Josefina), maintain NPO status and consider temporary non-oral means of nutrition/hydration/medication. MD to re-consult this department when pt's level of alertness improves.
The patient is a 53 year old female from group home with Cerebral Palsy (associated with severe ID, baseline non verbal, does not ambulate, however can feed herself, follows some commands), intractable seizures who presents with wheezing, constipation and edema of LE per aid at bedside. CT Chest 10/26/18 showed "Left lower lobe thick linear opacity containing high density foci and volume loss. Unchanged clustered nodules in the right upper lobe. Bilateral lower lobe groundglass and nodular opacities are new from the prior study."     Patient is known to this service from prior admission during which time a cinesophagram was performed (6/22/18) with recommendation for mechanical soft diet with honey-thick liquids (see chart for full report).  Patient was received in drowsy state though responsive to verbal/tactile stimuli this AM. Patient intermittently followed simple one-step directives with max cueing. Aide present from group home and reported a baseline diet of puree textures though unable to recall liquid consistency. Slight wheeze noted at baseline. Recommendations discussed with primary RN.

## 2018-10-28 NOTE — PROGRESS NOTE ADULT - ATTENDING COMMENTS
Neurology on call ([id number]) who states that Valproic acid can contribute to elevated ammonia levels.  Recommends holding Valproic acid in setting of elevated ammonia level, check levels in AM and when normalizes can resume. Neurology states that Valproic acid can contribute to elevated ammonia levels.  Recommends holding Valproic acid in setting of elevated ammonia level, check levels in AM and when normalizes can resume.  f/u ammonia levels in AM  f/u s/s eval.  Dec IVF sec to 1 plus edema.

## 2018-10-28 NOTE — SWALLOW BEDSIDE ASSESSMENT ADULT - SWALLOW EVAL: RECOMMENDED DIET
1) Continue NPO with consideration for non-oral means of nutrition/hydration/medication; 2) Cinesophagram to objectively assess the swallow mechanism and r/o silent aspiration

## 2018-10-28 NOTE — PROGRESS NOTE ADULT - SUBJECTIVE AND OBJECTIVE BOX
· Subjective and Objective: 	  Neurology Progress    NAYANA JACQUES53yFemale    HPI:  The patient is a 53 year old female with CP (a/w severe ID, baseline non verbal, does not ambulate, however can feed herself, follows some commands), intractable seizures who presents with wheezing, constipation and edema of LE per aid at bedside. Patient nonverbal at baseline and asleep. In ED was agitated and required sedation for lab/CT scan given lorazepam 2mg, haloperidol 2mg and 3mg.   Patient found to have hypercapnic respiratory failure on VBG after sedation and pulm was called for hypercapnia possible causing AMS, however on review pt has hx of hypercapnia on VBGs, with ABG showing chronic hypercapnic.   Patient seen asleep waking to stimuli.   In ED: Vitals 97.6; 69; 142/101; 16 100% on RA. Received lorazepam 2mg, haloperidol 2mg and 3mg. (17 Oct 2018 20:04)      Past Medical History  Intellectual disability  Constipation  Seizure disorder  Cerebral palsy      Past Surgical History  No significant past surgical history      MEDICATIONS    AQUAPHOR (petrolatum Ointment) 1 Application(s) Topical two times a day PRN  artificial tears (preservative free) Ophthalmic Solution 1 Drop(s) Both EYES two times a day  aspirin enteric coated 81 milliGRAM(s) Oral daily  benztropine 0.5 milliGRAM(s) Oral two times a day  bisacodyl Suppository 10 milliGRAM(s) Rectal daily PRN  buDESOnide   0.5 milliGRAM(s) Respule 0.5 milliGRAM(s) Inhalation daily  carBAMazepine XR Tablet 400 milliGRAM(s) Oral two times a day  carBAMazepine XR Tablet 100 milliGRAM(s) Oral daily  clotrimazole 1% Cream 1 Application(s) Topical two times a day PRN  dextrose 5% + sodium chloride 0.45%. 1000 milliLiter(s) IV Continuous <Continuous>  dextrose 5% + sodium chloride 0.9%. 1000 milliLiter(s) IV Continuous <Continuous>  docusate sodium Liquid 100 milliGRAM(s) Oral two times a day  enoxaparin Injectable 40 milliGRAM(s) SubCutaneous every 24 hours  fluticasone propionate 50 MICROgram(s)/spray Nasal Spray 1 Spray(s) Both Nostrils two times a day  folic acid 1 milliGRAM(s) Oral daily  lactulose Syrup 10 Gram(s) Oral daily  levETIRAcetam  IVPB 1000 milliGRAM(s) IV Intermittent every 12 hours  magnesium hydroxide Suspension 30 milliLiter(s) Oral at bedtime PRN  multivitamin 1 Tablet(s) Oral daily  nystatin Powder 1 Application(s) Topical two times a day PRN  OLANZapine 7.5 milliGRAM(s) Oral at bedtime  OLANZapine Injectable 5 milliGRAM(s) IntraMuscular every 6 hours PRN  pantoprazole    Tablet 40 milliGRAM(s) Oral before breakfast  phenytoin   Suspension 100 milliGRAM(s) Oral before breakfast  phenytoin   Suspension 200 milliGRAM(s) Oral at bedtime  piperacillin/tazobactam IVPB. 3.375 Gram(s) IV Intermittent every 8 hours  piperacillin/tazobactam IVPB. 3.375 Gram(s) IV Intermittent once  polyethylene glycol 3350 17 Gram(s) Oral two times a day  senna 2 Tablet(s) Oral at bedtime  thiamine 100 milliGRAM(s) Oral daily  valproate sodium IVPB 500 milliGRAM(s) IV Intermittent two times a day         Family history: No history of dementia, strokes, or seizures   FAMILY HISTORY:  No pertinent family history in first degree relatives    SOCIAL HISTORY -- No history of tobacco or alcohol use     Allergies    Topamax (Unknown)    Intolerances            Vital Signs Last 24 Hrs  T(C): 36.6  HR: 70  BP: 112/66  RR 20        On Neurological Examination:    Mental Status - Patient is alert, awake,  speech non verbal    Cranial Nerves - Extraocular muscle intact  WINSTON Facial symmetry Tongue midline, CnV1to V3 intact gross hearing intact      Motor Exam - spastic quad      Sensory    withdraw    GENERAL Exam:     Nontoxic , No Acute Distress   	  HEENT:  normocephalic, atraumatic  		  LUNGS:	Clear bilaterally  No Wheeze      VASCULAR: no carotid brui  	  HEART:	 Normal S1S2   No murmur RRR        	  MUSCULOSKELETAL: Normal Range of Motion  	   SKIN:      Normal   No Ecchymosis               LABS:    Urinalysis Basic - ( 26 Oct 2018 11:25 )    Color: LIGHT ORANGE / Appearance: TURBID / S.012 / pH: 6.5  Gluc: NEGATIVE / Ketone: TRACE  / Bili: NEGATIVE / Urobili: NORMAL   Blood: LARGE / Protein: 70 / Nitrite: NEGATIVE   Leuk Esterase: LARGE / RBC: >50 / WBC >50   Sq Epi: OCC / Non Sq Epi: x / Bacteria: MODERATE            Hemoglobin A1C:       Vitamin B12         RADIOLOGY    EKG                schoenberg     Assessment and Plan:   · Assessment		  This is a female with seizure.  Patient doing well with current medications  depakote on hold due to high ammonia       Electronic Signatures:  Schoenberg, Laura Gray (MD)

## 2018-10-28 NOTE — PROGRESS NOTE ADULT - PROBLEM SELECTOR PLAN 1
UTI causing worsening encephalopathy.  Ct of chest c/w asp PNA in b/l LL L>R  -toxic encephalopathy on top of prior metabolic encephalopathy?    Psychiatry consult appreciated.  Zyprexa changed to IM while patient is refusing PO meds. UTI causing worsening encephalopathy.  Ct of chest c/w asp PNA in b/l LL L>R  -toxic encephalopathy on top of prior metabolic encephalopathy?  Psychiatry consult appreciated.  Zyprexa changed to IM while patient is refusing PO meds.  Elevated ammonia levels sec to depokote on hold- fu Ammonia levels in AM

## 2018-10-28 NOTE — PROGRESS NOTE ADULT - SUBJECTIVE AND OBJECTIVE BOX
Patient is a 53y old  Female who presents with a chief complaint of Obstipation (28 Oct 2018 09:48)      SUBJECTIVE / OVERNIGHT EVENTS:  Patient seen with Candace - Attendant  at bedside.  non verbal at baseline- now open eyes.      MEDICATIONS  (STANDING):  artificial tears (preservative free) Ophthalmic Solution 1 Drop(s) Both EYES two times a day  aspirin enteric coated 81 milliGRAM(s) Oral daily  benztropine 0.5 milliGRAM(s) Oral two times a day  buDESOnide   0.5 milliGRAM(s) Respule 0.5 milliGRAM(s) Inhalation daily  carBAMazepine XR Tablet 400 milliGRAM(s) Oral two times a day  carBAMazepine XR Tablet 100 milliGRAM(s) Oral daily  dextrose 5% + sodium chloride 0.45%. 1000 milliLiter(s) (75 mL/Hr) IV Continuous <Continuous>  dextrose 5% + sodium chloride 0.9%. 1000 milliLiter(s) (75 mL/Hr) IV Continuous <Continuous>  docusate sodium Liquid 100 milliGRAM(s) Oral two times a day  enoxaparin Injectable 40 milliGRAM(s) SubCutaneous every 24 hours  fluticasone propionate 50 MICROgram(s)/spray Nasal Spray 1 Spray(s) Both Nostrils two times a day  folic acid 1 milliGRAM(s) Oral daily  lactulose Syrup 10 Gram(s) Oral daily  levETIRAcetam  IVPB 1000 milliGRAM(s) IV Intermittent every 12 hours  multivitamin 1 Tablet(s) Oral daily  OLANZapine 7.5 milliGRAM(s) Oral at bedtime  pantoprazole    Tablet 40 milliGRAM(s) Oral before breakfast  phenytoin  IVPB 100 milliGRAM(s) IV Intermittent before breakfast  phenytoin  IVPB 200 milliGRAM(s) IV Intermittent at bedtime  piperacillin/tazobactam IVPB. 3.375 Gram(s) IV Intermittent every 8 hours  piperacillin/tazobactam IVPB. 3.375 Gram(s) IV Intermittent once  polyethylene glycol 3350 17 Gram(s) Oral two times a day  senna 2 Tablet(s) Oral at bedtime  thiamine 100 milliGRAM(s) Oral daily    MEDICATIONS  (PRN):  AQUAPHOR (petrolatum Ointment) 1 Application(s) Topical two times a day PRN sacrum rash  bisacodyl Suppository 10 milliGRAM(s) Rectal daily PRN Constipation  clotrimazole 1% Cream 1 Application(s) Topical two times a day PRN rash  magnesium hydroxide Suspension 30 milliLiter(s) Oral at bedtime PRN for congestion, for constipation  nystatin Powder 1 Application(s) Topical two times a day PRN erythema of sacrum  OLANZapine Injectable 5 milliGRAM(s) IntraMuscular every 6 hours PRN agitation        CAPILLARY BLOOD GLUCOSE      POCT Blood Glucose.: 107 mg/dL (27 Oct 2018 23:17)  POCT Blood Glucose.: 83 mg/dL (27 Oct 2018 19:24)    I&O's Summary      PHYSICAL EXAM:  Vital Signs Last 24 Hrs  T(C): 36.7 (28 Oct 2018 05:16), Max: 37 (27 Oct 2018 22:52)  T(F): 98.1 (28 Oct 2018 05:16), Max: 98.6 (27 Oct 2018 22:52)  HR: 60 (28 Oct 2018 05:16) (60 - 70)  BP: 152/84 (28 Oct 2018 05:16) (152/84 - 155/94)  BP(mean): --  RR: 18 (28 Oct 2018 05:16) (18 - 19)  SpO2: 97% (28 Oct 2018 05:16) (97% - 98%)  GENERAL: NAD, well-developed  HEAD:  Atraumatic, Normocephalic  EYES: EOMI, PERRLA, conjunctiva and sclera clear  NECK: Supple, No JVD  CHEST/LUNG: Clear to auscultation bilaterally; No wheeze  HEART: Regular rate and rhythm; No murmurs, rubs, or gallops  ABDOMEN: Soft, Nontender, Nondistended; Bowel sounds present  EXTREMITIES:  2+ Peripheral Pulses, No clubbing, cyanosis,   1 plus edema to both upper extremities.  PSYCH: Alert  Legs with Z floats    LABS:                        12.7   3.76  )-----------( 103      ( 27 Oct 2018 09:28 )             39.8         Urinalysis Basic - ( 26 Oct 2018 11:25 )    Color: LIGHT ORANGE / Appearance: TURBID / S.012 / pH: 6.5  Gluc: NEGATIVE / Ketone: TRACE  / Bili: NEGATIVE / Urobili: NORMAL   Blood: LARGE / Protein: 70 / Nitrite: NEGATIVE   Leuk Esterase: LARGE / RBC: >50 / WBC >50   Sq Epi: OCC / Non Sq Epi: x / Bacteria: MODERATE        RADIOLOGY & ADDITIONAL TESTS:    Imaging Personally Reviewed:    Consultant(s) Notes Reviewed:      Care Discussed with Consultants/Other Providers:

## 2018-10-28 NOTE — CHART NOTE - NSCHARTNOTEFT_GEN_A_CORE
Called by nurse for Phenytoin level of 30.1  Will hold Phenytoin dose tonight  Will FU level in AM   Will continue to monitor

## 2018-10-28 NOTE — PROGRESS NOTE ADULT - ASSESSMENT
53F admitted with acute metabolic encephalopathy complicated by severe constipation, chronic hypercarbia, cerebral palsy - non-verbal, bipolar/psychosis, epilepsy.  Patient had hypothermia overnight 10/25/18 to 10/26/18  -warming blanket started.  Ua c/w UTI, cxr- clear 10/26. Zosyn started for UTI causing worsening encephalopathy.-toxic encephalopathy on top of prior metabolic encephalopathy?  CT of chest ord to r/o Asp PNA - b/l lower lobe opacities susp for PNA - d/w rad    Acute on ongoing encephalopathy sec to UTI with GNR and Asp PNA  Functional Quad  Dysphagia    < from: Xray Chest 1 View- PORTABLE-Urgent (10.26.18 @ 12:52) >  IMPRESSION: No new focal opacity.     < end of copied text > 53F admitted with acute metabolic encephalopathy complicated by severe constipation, chronic hypercarbia, cerebral palsy - non-verbal, bipolar/psychosis, epilepsy.  Patient had hypothermia overnight 10/25/18 to 10/26/18  -warming blanket started.  Ua c/w UTI, cxr- clear 10/26. Zosyn started for UTI causing worsening encephalopathy.-toxic encephalopathy on top of prior metabolic encephalopathy?  CT of chest ord to r/o Asp PNA - b/l lower lobe opacities susp for PNA - d/w rad    Acute on ongoing encephalopathy sec to UTI with GNR and Asp PNA  Functional Quad  Dysphagia  Elevated Ammonia levels 10/27- Depakote on hold- neuro f/u appreciated.- need f/u Ammonia levels

## 2018-10-28 NOTE — SWALLOW BEDSIDE ASSESSMENT ADULT - SWALLOW EVAL: DIAGNOSIS
Patient demonstrates oropharyngeal dysphagia characterized by reduced oral aperture to utensil presentation, decreased bolus collection, delayed anterior-posterior transfer , a suspected delay in pharyngeal trigger and reduced hyolaryngeal excursion upon digital palpation. Slight wheeze noted to persist throughout trials. No overt coughing or throat clearing noted however given results of recent chest imaging and h/o dysphagia, patient deemed candidate for objective testing to r/o silent aspiration.

## 2018-10-29 DIAGNOSIS — J69.0 PNEUMONITIS DUE TO INHALATION OF FOOD AND VOMIT: ICD-10-CM

## 2018-10-29 DIAGNOSIS — R13.10 DYSPHAGIA, UNSPECIFIED: ICD-10-CM

## 2018-10-29 LAB
AMMONIA BLD-MCNC: 65 UMOL/L — HIGH (ref 11–55)
BASOPHILS # BLD AUTO: 0.01 K/UL — SIGNIFICANT CHANGE UP (ref 0–0.2)
BASOPHILS NFR BLD AUTO: 0.2 % — SIGNIFICANT CHANGE UP (ref 0–2)
BUN SERPL-MCNC: 3 MG/DL — LOW (ref 7–23)
BUN SERPL-MCNC: 3 MG/DL — LOW (ref 7–23)
CALCIUM SERPL-MCNC: 8.5 MG/DL — SIGNIFICANT CHANGE UP (ref 8.4–10.5)
CALCIUM SERPL-MCNC: 8.5 MG/DL — SIGNIFICANT CHANGE UP (ref 8.4–10.5)
CHLORIDE SERPL-SCNC: 103 MMOL/L — SIGNIFICANT CHANGE UP (ref 98–107)
CHLORIDE SERPL-SCNC: 103 MMOL/L — SIGNIFICANT CHANGE UP (ref 98–107)
CO2 SERPL-SCNC: 31 MMOL/L — SIGNIFICANT CHANGE UP (ref 22–31)
CO2 SERPL-SCNC: 31 MMOL/L — SIGNIFICANT CHANGE UP (ref 22–31)
CREAT SERPL-MCNC: 0.54 MG/DL — SIGNIFICANT CHANGE UP (ref 0.5–1.3)
CREAT SERPL-MCNC: 0.54 MG/DL — SIGNIFICANT CHANGE UP (ref 0.5–1.3)
EOSINOPHIL # BLD AUTO: 0.36 K/UL — SIGNIFICANT CHANGE UP (ref 0–0.5)
EOSINOPHIL NFR BLD AUTO: 7.2 % — HIGH (ref 0–6)
GLUCOSE SERPL-MCNC: 98 MG/DL — SIGNIFICANT CHANGE UP (ref 70–99)
GLUCOSE SERPL-MCNC: 98 MG/DL — SIGNIFICANT CHANGE UP (ref 70–99)
HCT VFR BLD CALC: 36.8 % — SIGNIFICANT CHANGE UP (ref 34.5–45)
HCT VFR BLD CALC: 36.8 % — SIGNIFICANT CHANGE UP (ref 34.5–45)
HGB BLD-MCNC: 11.7 G/DL — SIGNIFICANT CHANGE UP (ref 11.5–15.5)
HGB BLD-MCNC: 11.7 G/DL — SIGNIFICANT CHANGE UP (ref 11.5–15.5)
IMM GRANULOCYTES # BLD AUTO: 0.07 # — SIGNIFICANT CHANGE UP
IMM GRANULOCYTES NFR BLD AUTO: 1.4 % — SIGNIFICANT CHANGE UP (ref 0–1.5)
LYMPHOCYTES # BLD AUTO: 1.57 K/UL — SIGNIFICANT CHANGE UP (ref 1–3.3)
LYMPHOCYTES # BLD AUTO: 31.6 % — SIGNIFICANT CHANGE UP (ref 13–44)
MAGNESIUM SERPL-MCNC: 1.9 MG/DL — SIGNIFICANT CHANGE UP (ref 1.6–2.6)
MAGNESIUM SERPL-MCNC: 1.9 MG/DL — SIGNIFICANT CHANGE UP (ref 1.6–2.6)
MCHC RBC-ENTMCNC: 31.8 % — LOW (ref 32–36)
MCHC RBC-ENTMCNC: 31.8 % — LOW (ref 32–36)
MCHC RBC-ENTMCNC: 32.3 PG — SIGNIFICANT CHANGE UP (ref 27–34)
MCHC RBC-ENTMCNC: 32.3 PG — SIGNIFICANT CHANGE UP (ref 27–34)
MCV RBC AUTO: 101.7 FL — HIGH (ref 80–100)
MCV RBC AUTO: 101.7 FL — HIGH (ref 80–100)
MONOCYTES # BLD AUTO: 0.57 K/UL — SIGNIFICANT CHANGE UP (ref 0–0.9)
MONOCYTES NFR BLD AUTO: 11.5 % — SIGNIFICANT CHANGE UP (ref 2–14)
NEUTROPHILS # BLD AUTO: 2.39 K/UL — SIGNIFICANT CHANGE UP (ref 1.8–7.4)
NEUTROPHILS NFR BLD AUTO: 48.1 % — SIGNIFICANT CHANGE UP (ref 43–77)
NRBC # FLD: 0 — SIGNIFICANT CHANGE UP
NRBC # FLD: 0 — SIGNIFICANT CHANGE UP
PHENYTOIN FREE SERPL-MCNC: 24.6 UG/ML — HIGH (ref 10–20)
PHOSPHATE SERPL-MCNC: 3.4 MG/DL — SIGNIFICANT CHANGE UP (ref 2.5–4.5)
PLATELET # BLD AUTO: 119 K/UL — LOW (ref 150–400)
PLATELET # BLD AUTO: 119 K/UL — LOW (ref 150–400)
PMV BLD: 10.6 FL — SIGNIFICANT CHANGE UP (ref 7–13)
PMV BLD: 10.6 FL — SIGNIFICANT CHANGE UP (ref 7–13)
POTASSIUM SERPL-MCNC: 3.7 MMOL/L — SIGNIFICANT CHANGE UP (ref 3.5–5.3)
POTASSIUM SERPL-MCNC: 3.7 MMOL/L — SIGNIFICANT CHANGE UP (ref 3.5–5.3)
POTASSIUM SERPL-SCNC: 3.7 MMOL/L — SIGNIFICANT CHANGE UP (ref 3.5–5.3)
POTASSIUM SERPL-SCNC: 3.7 MMOL/L — SIGNIFICANT CHANGE UP (ref 3.5–5.3)
RBC # BLD: 3.62 M/UL — LOW (ref 3.8–5.2)
RBC # BLD: 3.62 M/UL — LOW (ref 3.8–5.2)
RBC # FLD: 14.3 % — SIGNIFICANT CHANGE UP (ref 10.3–14.5)
RBC # FLD: 14.3 % — SIGNIFICANT CHANGE UP (ref 10.3–14.5)
SODIUM SERPL-SCNC: 144 MMOL/L — SIGNIFICANT CHANGE UP (ref 135–145)
SODIUM SERPL-SCNC: 144 MMOL/L — SIGNIFICANT CHANGE UP (ref 135–145)
TSH SERPL-MCNC: 9.16 UIU/ML — HIGH (ref 0.27–4.2)
WBC # BLD: 4.97 K/UL — SIGNIFICANT CHANGE UP (ref 3.8–10.5)
WBC # BLD: 4.97 K/UL — SIGNIFICANT CHANGE UP (ref 3.8–10.5)
WBC # FLD AUTO: 4.97 K/UL — SIGNIFICANT CHANGE UP (ref 3.8–10.5)
WBC # FLD AUTO: 4.97 K/UL — SIGNIFICANT CHANGE UP (ref 3.8–10.5)

## 2018-10-29 PROCEDURE — 99233 SBSQ HOSP IP/OBS HIGH 50: CPT

## 2018-10-29 PROCEDURE — 99232 SBSQ HOSP IP/OBS MODERATE 35: CPT

## 2018-10-29 PROCEDURE — 74230 X-RAY XM SWLNG FUNCJ C+: CPT | Mod: 26

## 2018-10-29 RX ORDER — LACTULOSE 10 G/15ML
200 SOLUTION ORAL THREE TIMES A DAY
Refills: 0 | Status: DISCONTINUED | OUTPATIENT
Start: 2018-10-29 | End: 2018-10-30

## 2018-10-29 RX ADMIN — Medication 1 DROP(S): at 06:06

## 2018-10-29 RX ADMIN — NYSTATIN CREAM 1 APPLICATION(S): 100000 CREAM TOPICAL at 06:05

## 2018-10-29 RX ADMIN — Medication 104 MILLIGRAM(S): at 22:41

## 2018-10-29 RX ADMIN — LEVETIRACETAM 400 MILLIGRAM(S): 250 TABLET, FILM COATED ORAL at 06:06

## 2018-10-29 RX ADMIN — LACTULOSE 200 GRAM(S): 10 SOLUTION ORAL at 23:04

## 2018-10-29 RX ADMIN — LEVETIRACETAM 400 MILLIGRAM(S): 250 TABLET, FILM COATED ORAL at 19:53

## 2018-10-29 RX ADMIN — ENOXAPARIN SODIUM 40 MILLIGRAM(S): 100 INJECTION SUBCUTANEOUS at 18:48

## 2018-10-29 RX ADMIN — Medication 1 SPRAY(S): at 06:05

## 2018-10-29 RX ADMIN — LACTULOSE 200 GRAM(S): 10 SOLUTION ORAL at 15:15

## 2018-10-29 RX ADMIN — OLANZAPINE 5 MILLIGRAM(S): 15 TABLET, FILM COATED ORAL at 15:37

## 2018-10-29 RX ADMIN — PIPERACILLIN AND TAZOBACTAM 25 GRAM(S): 4; .5 INJECTION, POWDER, LYOPHILIZED, FOR SOLUTION INTRAVENOUS at 06:06

## 2018-10-29 RX ADMIN — PIPERACILLIN AND TAZOBACTAM 25 GRAM(S): 4; .5 INJECTION, POWDER, LYOPHILIZED, FOR SOLUTION INTRAVENOUS at 23:03

## 2018-10-29 RX ADMIN — Medication 0.5 MILLIGRAM(S): at 10:16

## 2018-10-29 RX ADMIN — Medication 1 SPRAY(S): at 18:46

## 2018-10-29 RX ADMIN — Medication 1 DROP(S): at 18:47

## 2018-10-29 NOTE — PROGRESS NOTE ADULT - PROBLEM SELECTOR PLAN 5
Patient on several antiepileptics due to recent MICU for status epilepticus.   No signs of seizure activity  -continue home divalproex, phenytoin, levetiracetam, carbamazepine ( missed 2 doses of carbamazepine) - IV formulation while she's not tolerating PO intake.  - check the levels of Phenytoin, valproic acid, carbamazepine and adjust dosing accordingly.  - will follow up with neurology for recommendations.

## 2018-10-29 NOTE — PROGRESS NOTE ADULT - SUBJECTIVE AND OBJECTIVE BOX
CC: F/U for encephalopathy    SUBJECTIVE / OVERNIGHT EVENTS:  No events overnight.  Still with no resolution of her mental status to baseline.  No F/C, N/V, CP, SOB, Cough, diarrhea.    MEDICATIONS  (STANDING):  artificial tears (preservative free) Ophthalmic Solution 1 Drop(s) Both EYES two times a day  buDESOnide   0.5 milliGRAM(s) Respule 0.5 milliGRAM(s) Inhalation daily  dextrose 5% + sodium chloride 0.45%. 1000 milliLiter(s) (40 mL/Hr) IV Continuous <Continuous>  enoxaparin Injectable 40 milliGRAM(s) SubCutaneous every 24 hours  fluticasone propionate 50 MICROgram(s)/spray Nasal Spray 1 Spray(s) Both Nostrils two times a day  lactulose Retention Enema 200 Gram(s) Rectal three times a day  levETIRAcetam  IVPB 1000 milliGRAM(s) IV Intermittent every 12 hours  phenytoin  IVPB 100 milliGRAM(s) IV Intermittent at bedtime  phenytoin  IVPB 100 milliGRAM(s) IV Intermittent before breakfast  piperacillin/tazobactam IVPB. 3.375 Gram(s) IV Intermittent every 8 hours  piperacillin/tazobactam IVPB. 3.375 Gram(s) IV Intermittent once    MEDICATIONS  (PRN):  AQUAPHOR (petrolatum Ointment) 1 Application(s) Topical two times a day PRN sacrum rash  clotrimazole 1% Cream 1 Application(s) Topical two times a day PRN rash  nystatin Powder 1 Application(s) Topical two times a day PRN erythema of sacrum  OLANZapine Injectable 5 milliGRAM(s) IntraMuscular every 6 hours PRN agitation      Vital Signs Last 24 Hrs  T(C): 36.3 (29 Oct 2018 14:41), Max: 37 (29 Oct 2018 05:59)  T(F): 97.3 (29 Oct 2018 14:41), Max: 98.6 (29 Oct 2018 05:59)  HR: 71 (29 Oct 2018 14:41) (61 - 89)  BP: 131/68 (29 Oct 2018 14:41) (110/59 - 131/68)  BP(mean): --  RR: 16 (29 Oct 2018 14:41) (14 - 18)  SpO2: 98% (29 Oct 2018 14:41) (96% - 98%)  CAPILLARY BLOOD GLUCOSE      POCT Blood Glucose.: 84 mg/dL (29 Oct 2018 12:04)  POCT Blood Glucose.: 85 mg/dL (29 Oct 2018 06:55)  POCT Blood Glucose.: 85 mg/dL (28 Oct 2018 21:49)    I&O's Summary      PHYSICAL EXAM:  GENERAL: in bed, non-verbal, NAD, sleepy but arousable   HEENT:anicteric sclera  CHEST/LUNG: Clear to percussion bilaterally; No rales, rhonchi, wheezing, or rubs.   HEART: Regular rate and rhythm; No murmurs, rubs, or gallops  ABDOMEN: Soft, Nontender, Nondistended; Bowel sounds present.    EXTREMITIES:  contraction of b/l hands. +2 edema feet, no edema of shins.   SKIN: No rashes or lesions  PSYCH: not actively participating.  NEURO- pupils reactive Unable to assess further as pt does not follow commands     LABS:                        11.7   4.97  )-----------( 119      ( 29 Oct 2018 04:20 )             36.8     10-29    144  |  103  |  3<L>  ----------------------------<  98  3.7   |  31  |  0.54    Ca    8.5      29 Oct 2018 04:20  Phos  3.4     10-29  Mg     1.9     10-29    Ammonia, Serum: 65: Ammonia levels will be falsely elevated if specimen is NOT  collected, processed and maintained at 4-8 C. umol/L (10.29.18 @ 04:20)    Phenytoin Level, Serum (10.29.18 @ 04:20)    Phenytoin Level, Serum: 24.6 ug/mL                RADIOLOGY & ADDITIONAL TESTS:    Imaging Personally Reviewed:    Care Discussed with Consultants/Other Providers:

## 2018-10-29 NOTE — PROGRESS NOTE ADULT - ASSESSMENT
53F admitted with acute metabolic encephalopathy complicated by aspiration PNA from dysphagia, UTI, elevated ammonia level, severe constipation, chronic hypercarbia, cerebral palsy - non-verbal, bipolar/psychosis, epilepsy.

## 2018-10-29 NOTE — PROGRESS NOTE ADULT - SUBJECTIVE AND OBJECTIVE BOX
Follow Up:  hypothermia    Interval History: pt stable but still with episodes of hypothermia even on antibiotics    ROS:    Unobtainable because: nonverbal        Allergies  Topamax (Unknown)        ANTIMICROBIALS:  piperacillin/tazobactam IVPB. 3.375 every 8 hours  piperacillin/tazobactam IVPB. 3.375 once      OTHER MEDS:  AQUAPHOR (petrolatum Ointment) 1 Application(s) Topical two times a day PRN  artificial tears (preservative free) Ophthalmic Solution 1 Drop(s) Both EYES two times a day  buDESOnide   0.5 milliGRAM(s) Respule 0.5 milliGRAM(s) Inhalation daily  clotrimazole 1% Cream 1 Application(s) Topical two times a day PRN  enoxaparin Injectable 40 milliGRAM(s) SubCutaneous every 24 hours  fluticasone propionate 50 MICROgram(s)/spray Nasal Spray 1 Spray(s) Both Nostrils two times a day  lactulose Retention Enema 200 Gram(s) Rectal three times a day  levETIRAcetam  IVPB 1000 milliGRAM(s) IV Intermittent every 12 hours  nystatin Powder 1 Application(s) Topical two times a day PRN  OLANZapine Injectable 5 milliGRAM(s) IntraMuscular every 6 hours PRN  phenytoin  IVPB 100 milliGRAM(s) IV Intermittent at bedtime  phenytoin  IVPB 100 milliGRAM(s) IV Intermittent before breakfast      Vital Signs Last 24 Hrs  T(C): 36.3 (29 Oct 2018 14:41), Max: 37 (29 Oct 2018 05:59)  T(F): 97.3 (29 Oct 2018 14:41), Max: 98.6 (29 Oct 2018 05:59)  HR: 71 (29 Oct 2018 14:41) (61 - 89)  BP: 131/68 (29 Oct 2018 14:41) (110/59 - 131/68)  BP(mean): --  RR: 16 (29 Oct 2018 14:41) (14 - 18)  SpO2: 98% (29 Oct 2018 14:41) (96% - 98%)    Physical Exam:  General:    NAD,  non toxic  Head: atraumatic, normocephalic  Eye: normal sclera and conjunctiva  ENT:    no oropharyngeal lesions,   no LAD,   neck supple  Cardio:     regular S1, S2,  no murmur  Respiratory:    clear b/l,    no wheezing  abd:     soft,   BS +,   no tenderness,    no organomegaly  :   no CVAT,  no suprapubic tenderness,   no  mancilla  Musculoskeletal:   no joint swelling,   no edema  vascular: no lines, normal pulses  Skin:    no rash  Neurologic:    nonverbal, does not follow commands                            11.7   4.97  )-----------( 119      ( 29 Oct 2018 04:20 )             36.8       10-29    144  |  103  |  3<L>  ----------------------------<  98  3.7   |  31  |  0.54    Ca    8.5      29 Oct 2018 04:20  Phos  3.4     10-29  Mg     1.9     10-29            MICROBIOLOGY:  v  URINE CATHETER  10-26-18 --  --  Escherichia coli      BLOOD PERIPHERAL  10-26-18 --  --  --      BLOOD PERIPHERAL  10-26-18 --  --  --      URINE CATHETER  10-21-18 --  --  --      BLOOD  10-20-18 --  --  --      URINE CATHETER  10-17-18 --  --  --                RADIOLOGY:  Images below reviewed personally  < from: CT Chest No Cont (10.26.18 @ 21:54) >  IMPRESSION:    Left lower lobe atelectasis and volume loss with without superimposed   pneumonia. High density within this opacity may represent aspirated   material.    Bilateral lower lobe groundglass nodular opacities are new from the prior   study and may be infectious/inflammatory.    Three-month follow-up CT needed for complete evaluation.

## 2018-10-29 NOTE — PROGRESS NOTE ADULT - ASSESSMENT
53 F with CP. non verbal, seizure disorder, constipation, was brought in initially for constipation and ?wheezing, was afebrile, normal WBC, hypercapnia on ABG, but negative urine and blood cx, CXR limited but no consolidation, abd/plevis CT also no acute pathology  ID was called now as pt was hypothermic  no tachycardia or hypotension, exam unremarkable normal WBC, but positive u/a with straight cath and pt with cough while eating    hypothermia due to infectious etiology like UTI or aspiration pneumonia vs central causes  chest CT with ?aspiration material and urine cx with E-coli  still with hypothermia on antibiotics, likely the hypothermia itself is not due to infection      * Continue Zozyn 3.375g q8hrs now day 5  * will do a 7 day course for the aspiration

## 2018-10-29 NOTE — SWALLOW VFSS/MBS ASSESSMENT ADULT - COMMENTS
Patient is a 53 year old female with CP (a/w severe ID, baseline non verbal, does not ambulate, however can feed herself, follows some commands), intractable seizures who presents with wheezing, constipation and edema of LE per aid at bedside. Patient with severe constipation and now sedated due to agitation. - had BM on  10/20; Pt. from group home.  Dx: Acute Metabolic Encephalopathy    Patient had a Clinical Swallow Eval on 10/28/2018 (See Consult).

## 2018-10-29 NOTE — SWALLOW VFSS/MBS ASSESSMENT ADULT - PHARYNGEAL PHASE COMMENTS
adequate initiation of the pharyngeal swallow, adequate laryngeal elevation, adequate tongue base retraction, adequate pharyngeal constriction delayed initiation of the pharyngeal swallow, reduced laryngeal elevation, adequate tongue base retraction, adequate pharyngeal constriction delayed initiation of the pharyngeal swallow, adequate laryngeal elevation, adequate tongue base retraction, adequate pharyngeal constriction

## 2018-10-29 NOTE — PROGRESS NOTE ADULT - PROBLEM SELECTOR PLAN 4
patient stable, most recent blood gas with compensated chronic respiratory acidosis.    -no wheezing on exam, is not fluid overloaded at this time.

## 2018-10-29 NOTE — PROGRESS NOTE ADULT - PROBLEM SELECTOR PLAN 1
Aspiration PNA and UTI and possible hepatic encephalopathy.  Psychiatry consult appreciated.  Zyprexa changed to IM while patient is refusing PO meds.  Lactulose for elevated Ammonia level.  Will obtain RUQ sono and LFT levels in AM.

## 2018-10-29 NOTE — SWALLOW VFSS/MBS ASSESSMENT ADULT - RECOMMENDED CONSISTENCY
1.) Puree with Honey Thick Liquids with Feeding Assistance.  2.) Feeding/Swallowing Guidelines: Sit upright, Feed Slowly; provide teaspoon amount at a time of puree; small single cup sip of honey thick liquid.   3.) Aspiration Precautions  4.) Maintain Good Oral Hygiene Care

## 2018-10-29 NOTE — SWALLOW VFSS/MBS ASSESSMENT ADULT - DIAGNOSTIC IMPRESSIONS
Patient presents with Mild Oral Stage and Moderate Pharyngeal Stage Dysphagia. The Oral Stage is characterized by reduced oral containment with anterior loss/spillage from the oral cavity for Thin Liquids and Nectar Thick Liquids via cup sip presentation due to reduced lip seal/closure; intermittent oral holding of the bolus, adequate bolus manipulation, adequate tongue motion with adequate anterior to posterior transfer of the bolus with adequate oral clearance post swallow.   The Pharyngeal Stage is characterized by delayed initiation of the pharyngeal swallow (Bolus head is at the laryngeal surface of the epiglottis for Thin Liquids), reduced laryngeal elevation, adequate tongue base retraction and adequate pharyngeal constriction. There is adequate pharyngeal clearance post swallow.  There was Laryngeal Penetration for Thin Liquids without retrieval leaving Trace residue in the laryngeal vestibule for Thin Liquids. There was Laryngeal Penetration for Nectar Thick Liquids with retrieval and airway protection maintained.  Aspiration is not suspected for Puree and Honey Thick Liquids given Patient's body habitus limited view of the trachea.        Note: This is a limited study due to patient's baseline body habitus limited the view of the Trachea. Patient presents with Mild Oral Stage and Moderate Pharyngeal Stage Dysphagia. The Oral Stage is characterized by reduced oral containment with anterior loss/spillage from the oral cavity for Thin Liquids and Nectar Thick Liquids via cup sip presentation due to reduced lip seal/closure; intermittent oral holding of the bolus, adequate bolus manipulation, adequate tongue motion with adequate anterior to posterior transfer of the bolus with adequate oral clearance post swallow.   The Pharyngeal Stage is characterized by delayed initiation of the pharyngeal swallow (Bolus head is at the laryngeal surface of the epiglottis for Thin Liquids), reduced laryngeal elevation, adequate tongue base retraction and adequate pharyngeal constriction. There is adequate pharyngeal clearance post swallow.  There was Laryngeal Penetration for Thin Liquids without retrieval leaving Trace residue in the laryngeal vestibule for Thin Liquids; Aspiration cannot be ruled out for Thin Liquids due to patient's body habitus limited the view of the Trachea. There was Laryngeal Penetration for Nectar Thick Liquids with retrieval.  There was No Laryngeal Penetration for Puree/Honey Thick Liquids; Aspiration is not suspected and cannot be ruled out for Puree and Honey Thick Liquids given Patient's body habitus limited view of the trachea.     Note: This is a limited study due to patient's baseline body habitus limited the view of the Trachea. Patient presents with Mild Oral Stage and Moderate Pharyngeal Stage Dysphagia. The Oral Stage is characterized by reduced oral containment with anterior loss/spillage from the oral cavity for Thin Liquids and Nectar Thick Liquids via cup sip presentation due to reduced lip seal/closure; intermittent oral holding of the bolus, adequate bolus manipulation, adequate tongue motion with adequate anterior to posterior transfer of the bolus with adequate oral clearance post swallow.   The Pharyngeal Stage is characterized by delayed initiation of the pharyngeal swallow (Bolus head is at the laryngeal surface of the epiglottis for Thin Liquids), reduced laryngeal elevation, adequate tongue base retraction and adequate pharyngeal constriction. There is adequate pharyngeal clearance post swallow.  There was Laryngeal Penetration for Thin Liquids without retrieval leaving Trace residue in the laryngeal vestibule for Thin Liquids; Aspiration cannot be ruled out for Thin Liquids due to patient's body habitus limited the view of the Trachea. There was Laryngeal Penetration for Nectar Thick Liquids with retrieval.  There was No Laryngeal Penetration for Puree/Honey Thick Liquids; Aspiration is not suspected and cannot be ruled out for Puree and Honey Thick Liquids given no laryngeal penetration observed with these two consistencies as Patient's body habitus limited view of the trachea.     Note: This is a limited study due to patient's baseline body habitus limited the view of the Trachea.

## 2018-10-30 LAB
ALBUMIN SERPL ELPH-MCNC: 3.3 G/DL — SIGNIFICANT CHANGE UP (ref 3.3–5)
ALBUMIN SERPL ELPH-MCNC: 3.3 G/DL — SIGNIFICANT CHANGE UP (ref 3.3–5)
ALP SERPL-CCNC: 127 U/L — HIGH (ref 40–120)
ALP SERPL-CCNC: 127 U/L — HIGH (ref 40–120)
ALT FLD-CCNC: 18 U/L — SIGNIFICANT CHANGE UP (ref 4–33)
ALT FLD-CCNC: 18 U/L — SIGNIFICANT CHANGE UP (ref 4–33)
ANISOCYTOSIS BLD QL: SLIGHT — SIGNIFICANT CHANGE UP
AST SERPL-CCNC: 22 U/L — SIGNIFICANT CHANGE UP (ref 4–32)
AST SERPL-CCNC: 22 U/L — SIGNIFICANT CHANGE UP (ref 4–32)
BASOPHILS # BLD AUTO: 0.03 K/UL — SIGNIFICANT CHANGE UP (ref 0–0.2)
BASOPHILS NFR BLD AUTO: 0.5 % — SIGNIFICANT CHANGE UP (ref 0–2)
BASOPHILS NFR SPEC: 0 % — SIGNIFICANT CHANGE UP (ref 0–2)
BILIRUB DIRECT SERPL-MCNC: 0.1 MG/DL — SIGNIFICANT CHANGE UP (ref 0.1–0.2)
BILIRUB SERPL-MCNC: 0.2 MG/DL — SIGNIFICANT CHANGE UP (ref 0.2–1.2)
BILIRUB SERPL-MCNC: 0.2 MG/DL — SIGNIFICANT CHANGE UP (ref 0.2–1.2)
BLASTS # FLD: 0 % — SIGNIFICANT CHANGE UP (ref 0–0)
BUN SERPL-MCNC: 7 MG/DL — SIGNIFICANT CHANGE UP (ref 7–23)
BUN SERPL-MCNC: 7 MG/DL — SIGNIFICANT CHANGE UP (ref 7–23)
CALCIUM SERPL-MCNC: 9 MG/DL — SIGNIFICANT CHANGE UP (ref 8.4–10.5)
CALCIUM SERPL-MCNC: 9 MG/DL — SIGNIFICANT CHANGE UP (ref 8.4–10.5)
CHLORIDE SERPL-SCNC: 103 MMOL/L — SIGNIFICANT CHANGE UP (ref 98–107)
CHLORIDE SERPL-SCNC: 103 MMOL/L — SIGNIFICANT CHANGE UP (ref 98–107)
CO2 SERPL-SCNC: 33 MMOL/L — HIGH (ref 22–31)
CO2 SERPL-SCNC: 33 MMOL/L — HIGH (ref 22–31)
CREAT SERPL-MCNC: 0.67 MG/DL — SIGNIFICANT CHANGE UP (ref 0.5–1.3)
CREAT SERPL-MCNC: 0.67 MG/DL — SIGNIFICANT CHANGE UP (ref 0.5–1.3)
EOSINOPHIL # BLD AUTO: 0.33 K/UL — SIGNIFICANT CHANGE UP (ref 0–0.5)
EOSINOPHIL NFR BLD AUTO: 5.7 % — SIGNIFICANT CHANGE UP (ref 0–6)
EOSINOPHIL NFR FLD: 5.4 % — SIGNIFICANT CHANGE UP (ref 0–6)
GIANT PLATELETS BLD QL SMEAR: PRESENT — SIGNIFICANT CHANGE UP
GLUCOSE SERPL-MCNC: 119 MG/DL — HIGH (ref 70–99)
GLUCOSE SERPL-MCNC: 119 MG/DL — HIGH (ref 70–99)
HCT VFR BLD CALC: 38.7 % — SIGNIFICANT CHANGE UP (ref 34.5–45)
HGB BLD-MCNC: 12.2 G/DL — SIGNIFICANT CHANGE UP (ref 11.5–15.5)
IMM GRANULOCYTES # BLD AUTO: 0.18 # — SIGNIFICANT CHANGE UP
IMM GRANULOCYTES NFR BLD AUTO: 3.1 % — HIGH (ref 0–1.5)
LYMPHOCYTES # BLD AUTO: 1.46 K/UL — SIGNIFICANT CHANGE UP (ref 1–3.3)
LYMPHOCYTES # BLD AUTO: 25.3 % — SIGNIFICANT CHANGE UP (ref 13–44)
LYMPHOCYTES NFR SPEC AUTO: 20.7 % — SIGNIFICANT CHANGE UP (ref 13–44)
MACROCYTES BLD QL: SLIGHT — SIGNIFICANT CHANGE UP
MAGNESIUM SERPL-MCNC: 1.5 MG/DL — LOW (ref 1.6–2.6)
MCHC RBC-ENTMCNC: 31.5 % — LOW (ref 32–36)
MCHC RBC-ENTMCNC: 31.9 PG — SIGNIFICANT CHANGE UP (ref 27–34)
MCV RBC AUTO: 101.3 FL — HIGH (ref 80–100)
METAMYELOCYTES # FLD: 0 % — SIGNIFICANT CHANGE UP (ref 0–1)
MONOCYTES # BLD AUTO: 0.78 K/UL — SIGNIFICANT CHANGE UP (ref 0–0.9)
MONOCYTES NFR BLD AUTO: 13.5 % — SIGNIFICANT CHANGE UP (ref 2–14)
MONOCYTES NFR BLD: 9.9 % — HIGH (ref 2–9)
MYELOCYTES NFR BLD: 3.6 % — HIGH (ref 0–0)
NEUTROPHIL AB SER-ACNC: 56.8 % — SIGNIFICANT CHANGE UP (ref 43–77)
NEUTROPHILS # BLD AUTO: 2.99 K/UL — SIGNIFICANT CHANGE UP (ref 1.8–7.4)
NEUTROPHILS NFR BLD AUTO: 51.9 % — SIGNIFICANT CHANGE UP (ref 43–77)
NEUTS BAND # BLD: 0 % — SIGNIFICANT CHANGE UP (ref 0–6)
NRBC # FLD: 0.02 — SIGNIFICANT CHANGE UP
OTHER - HEMATOLOGY %: 0 — SIGNIFICANT CHANGE UP
PLATELET # BLD AUTO: 148 K/UL — LOW (ref 150–400)
PLATELET COUNT - ESTIMATE: NORMAL — SIGNIFICANT CHANGE UP
PMV BLD: 10.4 FL — SIGNIFICANT CHANGE UP (ref 7–13)
POTASSIUM SERPL-MCNC: 3.6 MMOL/L — SIGNIFICANT CHANGE UP (ref 3.5–5.3)
POTASSIUM SERPL-MCNC: 3.6 MMOL/L — SIGNIFICANT CHANGE UP (ref 3.5–5.3)
POTASSIUM SERPL-SCNC: 3.6 MMOL/L — SIGNIFICANT CHANGE UP (ref 3.5–5.3)
POTASSIUM SERPL-SCNC: 3.6 MMOL/L — SIGNIFICANT CHANGE UP (ref 3.5–5.3)
PROMYELOCYTES # FLD: 0 % — SIGNIFICANT CHANGE UP (ref 0–0)
PROT SERPL-MCNC: 7 G/DL — SIGNIFICANT CHANGE UP (ref 6–8.3)
PROT SERPL-MCNC: 7 G/DL — SIGNIFICANT CHANGE UP (ref 6–8.3)
RBC # BLD: 3.82 M/UL — SIGNIFICANT CHANGE UP (ref 3.8–5.2)
RBC # FLD: 14.5 % — SIGNIFICANT CHANGE UP (ref 10.3–14.5)
SODIUM SERPL-SCNC: 146 MMOL/L — HIGH (ref 135–145)
SODIUM SERPL-SCNC: 146 MMOL/L — HIGH (ref 135–145)
SPECIMEN SOURCE: SIGNIFICANT CHANGE UP
VARIANT LYMPHS # BLD: 3.6 % — SIGNIFICANT CHANGE UP
WBC # BLD: 5.77 K/UL — SIGNIFICANT CHANGE UP (ref 3.8–10.5)
WBC # FLD AUTO: 5.77 K/UL — SIGNIFICANT CHANGE UP (ref 3.8–10.5)

## 2018-10-30 PROCEDURE — 99232 SBSQ HOSP IP/OBS MODERATE 35: CPT

## 2018-10-30 PROCEDURE — 99233 SBSQ HOSP IP/OBS HIGH 50: CPT

## 2018-10-30 RX ORDER — LACTULOSE 10 G/15ML
10 SOLUTION ORAL THREE TIMES A DAY
Refills: 0 | Status: DISCONTINUED | OUTPATIENT
Start: 2018-10-30 | End: 2018-11-01

## 2018-10-30 RX ORDER — MAGNESIUM SULFATE 500 MG/ML
2 VIAL (ML) INJECTION ONCE
Refills: 0 | Status: COMPLETED | OUTPATIENT
Start: 2018-10-30 | End: 2018-10-30

## 2018-10-30 RX ADMIN — Medication 1 SPRAY(S): at 18:38

## 2018-10-30 RX ADMIN — LACTULOSE 10 GRAM(S): 10 SOLUTION ORAL at 13:33

## 2018-10-30 RX ADMIN — LACTULOSE 10 GRAM(S): 10 SOLUTION ORAL at 22:10

## 2018-10-30 RX ADMIN — LEVETIRACETAM 400 MILLIGRAM(S): 250 TABLET, FILM COATED ORAL at 05:53

## 2018-10-30 RX ADMIN — PIPERACILLIN AND TAZOBACTAM 25 GRAM(S): 4; .5 INJECTION, POWDER, LYOPHILIZED, FOR SOLUTION INTRAVENOUS at 06:57

## 2018-10-30 RX ADMIN — Medication 104 MILLIGRAM(S): at 23:04

## 2018-10-30 RX ADMIN — Medication 104 MILLIGRAM(S): at 06:21

## 2018-10-30 RX ADMIN — Medication 1 DROP(S): at 05:52

## 2018-10-30 RX ADMIN — PIPERACILLIN AND TAZOBACTAM 25 GRAM(S): 4; .5 INJECTION, POWDER, LYOPHILIZED, FOR SOLUTION INTRAVENOUS at 13:34

## 2018-10-30 RX ADMIN — Medication 1 DROP(S): at 18:39

## 2018-10-30 RX ADMIN — Medication 50 GRAM(S): at 22:03

## 2018-10-30 RX ADMIN — ENOXAPARIN SODIUM 40 MILLIGRAM(S): 100 INJECTION SUBCUTANEOUS at 18:38

## 2018-10-30 RX ADMIN — Medication 1 SPRAY(S): at 05:52

## 2018-10-30 RX ADMIN — LEVETIRACETAM 400 MILLIGRAM(S): 250 TABLET, FILM COATED ORAL at 18:39

## 2018-10-30 RX ADMIN — LACTULOSE 200 GRAM(S): 10 SOLUTION ORAL at 06:57

## 2018-10-30 NOTE — DIETITIAN INITIAL EVALUATION ADULT. - PROBLEM SELECTOR PLAN 1
Patient with tense abdomen without TTP, CT negative for obstruction, hx 3 day w/o BM, unclear of patient's baseline. On many laxatives. Likely exacerbated by chronic benztropine use.   -Will trial oral laxatives with Miralax and Senna. Also start stool softeners with Colace.  -If unsuccessful may need manual disimpaction.

## 2018-10-30 NOTE — DIETITIAN INITIAL EVALUATION ADULT. - ORAL INTAKE PTA
Per Aid- patient was eating well, consuming 3 meals a day. Patient would eat a variety of foods./good

## 2018-10-30 NOTE — DIETITIAN INITIAL EVALUATION ADULT. - NS AS NUTRI INTERV FEED ASSISTANCE
Continue to provide total feeding assistance to help with caloric and protein intake. Reviewed menu selections and alternatives to help with PO intake.

## 2018-10-30 NOTE — DIETITIAN INITIAL EVALUATION ADULT. - PERTINENT LABORATORY DATA
10-30 Na146 mmol/L<H> Glu 119 mg/dL<H> K+ 3.6 mmol/L Cr  0.67 mg/dL BUN 7 mg/dL 10-29 Phos 3.4 mg/dL 10-30 Alb 3.3 g/dL 10-18 Chol 212 mg/dL<H>  mg/dL HDL 67 mg/dL<H> Trig 96 mg/dL. POCT: 161, 138, 84, 68

## 2018-10-30 NOTE — PROGRESS NOTE ADULT - ASSESSMENT
53 F with CP. non verbal, seizure disorder, constipation, was brought in initially for constipation and ?wheezing, was afebrile, normal WBC, hypercapnia on ABG, but negative urine and blood cx, CXR limited but no consolidation, abd/plevis CT also no acute pathology  ID was called now as pt was hypothermic  no tachycardia or hypotension, exam unremarkable normal WBC, but positive u/a with straight cath and pt with cough while eating    hypothermia due to infectious etiology like UTI or aspiration pneumonia vs central causes as it is occurring everyday while pt completely stable and on antibiotics  chest CT with ?aspiration material and urine cx with E-coli  still with hypothermia on antibiotics, likely the hypothermia itself is not due to infection      * Continue Zozyn 3.375g q8hrs now day 6  * will do a 7 day course for the aspiration, can DC after tomorrow

## 2018-10-30 NOTE — DIETITIAN INITIAL EVALUATION ADULT. - PERTINENT MEDS FT
MEDICATIONS  (STANDING):  artificial tears (preservative free) Ophthalmic Solution 1 Drop(s) Both EYES two times a day  buDESOnide   0.5 milliGRAM(s) Respule 0.5 milliGRAM(s) Inhalation daily  enoxaparin Injectable 40 milliGRAM(s) SubCutaneous every 24 hours  fluticasone propionate 50 MICROgram(s)/spray Nasal Spray 1 Spray(s) Both Nostrils two times a day  lactulose Syrup 10 Gram(s) Oral three times a day  levETIRAcetam  IVPB 1000 milliGRAM(s) IV Intermittent every 12 hours  LORazepam   Injectable 1 milliGRAM(s) IntraMuscular once  phenytoin  IVPB 100 milliGRAM(s) IV Intermittent at bedtime  phenytoin  IVPB 100 milliGRAM(s) IV Intermittent before breakfast  piperacillin/tazobactam IVPB. 3.375 Gram(s) IV Intermittent every 8 hours  piperacillin/tazobactam IVPB. 3.375 Gram(s) IV Intermittent once    MEDICATIONS  (PRN):  AQUAPHOR (petrolatum Ointment) 1 Application(s) Topical two times a day PRN sacrum rash  clotrimazole 1% Cream 1 Application(s) Topical two times a day PRN rash  nystatin Powder 1 Application(s) Topical two times a day PRN erythema of sacrum  OLANZapine Injectable 5 milliGRAM(s) IntraMuscular every 6 hours PRN agitation

## 2018-10-30 NOTE — PROGRESS NOTE ADULT - SUBJECTIVE AND OBJECTIVE BOX
Follow Up:  hypothermia    Interval History: pt stable and improved mental status but still with episodes of hypothermia even on antibiotics    ROS:    Unobtainable because: nonverbal        Allergies  Topamax (Unknown)        ANTIMICROBIALS:  piperacillin/tazobactam IVPB. 3.375 every 8 hours  piperacillin/tazobactam IVPB. 3.375 once      OTHER MEDS:  AQUAPHOR (petrolatum Ointment) 1 Application(s) Topical two times a day PRN  artificial tears (preservative free) Ophthalmic Solution 1 Drop(s) Both EYES two times a day  buDESOnide   0.5 milliGRAM(s) Respule 0.5 milliGRAM(s) Inhalation daily  clotrimazole 1% Cream 1 Application(s) Topical two times a day PRN  enoxaparin Injectable 40 milliGRAM(s) SubCutaneous every 24 hours  fluticasone propionate 50 MICROgram(s)/spray Nasal Spray 1 Spray(s) Both Nostrils two times a day  lactulose Syrup 10 Gram(s) Oral three times a day  levETIRAcetam  IVPB 1000 milliGRAM(s) IV Intermittent every 12 hours  LORazepam   Injectable 1 milliGRAM(s) IntraMuscular once  nystatin Powder 1 Application(s) Topical two times a day PRN  OLANZapine Injectable 5 milliGRAM(s) IntraMuscular every 6 hours PRN  phenytoin  IVPB 100 milliGRAM(s) IV Intermittent at bedtime  phenytoin  IVPB 100 milliGRAM(s) IV Intermittent before breakfast      Vital Signs Last 24 Hrs  T(C): 36.7 (30 Oct 2018 05:43), Max: 36.7 (30 Oct 2018 05:43)  T(F): 98 (30 Oct 2018 05:43), Max: 98 (30 Oct 2018 05:43)  HR: 82 (30 Oct 2018 05:43) (71 - 82)  BP: 144/87 (30 Oct 2018 05:43) (122/62 - 144/87)  BP(mean): --  RR: 18 (30 Oct 2018 05:43) (16 - 18)  SpO2: 98% (30 Oct 2018 05:43) (98% - 98%)    Physical Exam:  General:    NAD,  non toxic  Head: atraumatic, normocephalic  Eye: normal sclera and conjunctiva  ENT:    no oropharyngeal lesions,   no LAD,   neck supple  Cardio:     regular S1, S2,  no murmur  Respiratory:    clear b/l,    no wheezing  abd:     soft,   BS +,   no tenderness,    no organomegaly  :   no CVAT,  no suprapubic tenderness,   no  mancilla  Musculoskeletal:   no joint swelling,   no edema  vascular: no lines, normal pulses  Skin:    no rash  Neurologic:  awake and moving,  nonverbal                        12.2   5.77  )-----------( 148      ( 30 Oct 2018 12:15 )             38.7       10-30    146<H>  |  103  |  7   ----------------------------<  119<H>  3.6   |  33<H>  |  0.67    Ca    9.0      30 Oct 2018 12:15  Phos  3.4     10-29  Mg     1.5     10-30    TPro  7.0  /  Alb  3.3  /  TBili  0.2  /  DBili  0.1  /  AST  22  /  ALT  18  /  AlkPhos  127<H>  10-30          MICROBIOLOGY:  v  BLOOD  10-29-18 --  --  --      URINE CATHETER  10-26-18 --  --  Escherichia coli      BLOOD PERIPHERAL  10-26-18 --  --  --      BLOOD PERIPHERAL  10-26-18 --  --  --      URINE CATHETER  10-21-18 --  --  --      BLOOD  10-20-18 --  --  --      URINE CATHETER  10-17-18 --  --  --                RADIOLOGY:  Images below reviewed personally  < from: CT Chest No Cont (10.26.18 @ 21:54) >  IMPRESSION:    Left lower lobe atelectasis and volume loss with without superimposed   pneumonia. High density within this opacity may represent aspirated   material.    Bilateral lower lobe groundglass nodular opacities are new from the prior   study and may be infectious/inflammatory.    Three-month follow-up CT needed for complete evaluation.

## 2018-10-30 NOTE — PROGRESS NOTE ADULT - SUBJECTIVE AND OBJECTIVE BOX
CC: F/U for encephalopathy    SUBJECTIVE / OVERNIGHT EVENTS:  Patient's mental status is markedly improved today.  As per aid, this is similar to how she is usually at the group home.  More receptive to PO intake.  No F/C, N/V, CP, SOB, Cough, diarrhea.    MEDICATIONS  (STANDING):  artificial tears (preservative free) Ophthalmic Solution 1 Drop(s) Both EYES two times a day  buDESOnide   0.5 milliGRAM(s) Respule 0.5 milliGRAM(s) Inhalation daily  enoxaparin Injectable 40 milliGRAM(s) SubCutaneous every 24 hours  fluticasone propionate 50 MICROgram(s)/spray Nasal Spray 1 Spray(s) Both Nostrils two times a day  lactulose Syrup 10 Gram(s) Oral three times a day  levETIRAcetam  IVPB 1000 milliGRAM(s) IV Intermittent every 12 hours  LORazepam   Injectable 1 milliGRAM(s) IntraMuscular once  phenytoin  IVPB 100 milliGRAM(s) IV Intermittent at bedtime  phenytoin  IVPB 100 milliGRAM(s) IV Intermittent before breakfast  piperacillin/tazobactam IVPB. 3.375 Gram(s) IV Intermittent every 8 hours  piperacillin/tazobactam IVPB. 3.375 Gram(s) IV Intermittent once    MEDICATIONS  (PRN):  AQUAPHOR (petrolatum Ointment) 1 Application(s) Topical two times a day PRN sacrum rash  clotrimazole 1% Cream 1 Application(s) Topical two times a day PRN rash  nystatin Powder 1 Application(s) Topical two times a day PRN erythema of sacrum  OLANZapine Injectable 5 milliGRAM(s) IntraMuscular every 6 hours PRN agitation      Vital Signs Last 24 Hrs  T(C): 36.7 (30 Oct 2018 05:43), Max: 36.7 (30 Oct 2018 05:43)  T(F): 98 (30 Oct 2018 05:43), Max: 98 (30 Oct 2018 05:43)  HR: 82 (30 Oct 2018 05:43) (71 - 82)  BP: 144/87 (30 Oct 2018 05:43) (122/62 - 144/87)  BP(mean): --  RR: 18 (30 Oct 2018 05:43) (16 - 18)  SpO2: 98% (30 Oct 2018 05:43) (98% - 98%)  CAPILLARY BLOOD GLUCOSE  84 (29 Oct 2018 18:15)      POCT Blood Glucose.: 161 mg/dL (30 Oct 2018 08:30)  POCT Blood Glucose.: 138 mg/dL (29 Oct 2018 21:57)  POCT Blood Glucose.: 84 mg/dL (29 Oct 2018 18:39)  POCT Blood Glucose.: 68 mg/dL (29 Oct 2018 17:45)    I&O's Summary    29 Oct 2018 07:01  -  30 Oct 2018 07:00  --------------------------------------------------------  IN: 120 mL / OUT: 0 mL / NET: 120 mL        PHYSICAL EXAM:  GENERAL: in bed, non-verbal, NAD, marked improvement in awareness compared to yesterday.  HEENT: anicteric sclera  CHEST/LUNG: Clear to percussion bilaterally; No rales, rhonchi, wheezing, or rubs.   HEART: Regular rate and rhythm; No murmurs, rubs, or gallops  ABDOMEN: Soft, Nontender, Nondistended; Bowel sounds present.    EXTREMITIES:  contraction of b/l hands. +2 edema feet, no edema of shins.   SKIN: No rashes or lesions  PSYCH: not actively participating.  NEURO- Alert and aware, pupils reactive Unable to assess further as pt does not follow commands     LABS:                        12.2   5.77  )-----------( 148      ( 30 Oct 2018 12:15 )             38.7     10-30    146<H>  |  103  |  7   ----------------------------<  119<H>  3.6   |  33<H>  |  0.67    Ca    9.0      30 Oct 2018 12:15  Phos  3.4     10-29  Mg     1.5     10-30    TPro  7.0  /  Alb  3.3  /  TBili  0.2  /  DBili  0.1  /  AST  22  /  ALT  18  /  AlkPhos  127<H>  10-30              RADIOLOGY & ADDITIONAL TESTS:    Imaging Personally Reviewed:    Care Discussed with Consultants/Other Providers:

## 2018-10-30 NOTE — PROGRESS NOTE ADULT - PROBLEM SELECTOR PLAN 1
Improvement in mental status spontaneously.  Will monitor for next 24 hours.  Aspiration PNA and UTI and possible hepatic encephalopathy.  Zyprexa changed to IM while patient is refusing PO meds.  Lactulose for elevated Ammonia level.  Will obtain RUQ sono and LFT levels in AM.

## 2018-10-30 NOTE — DIETITIAN INITIAL EVALUATION ADULT. - OTHER INFO
Patient seen for LOS. Per chart: Patient is 53 year old female admitted with acute metabolic encephalopathy complicated by aspiration PNA from dysphagia, UTI, elevated ammonia level, severe constipation, chronic hypercarbia, cerebral palsy - non-verbal, bipolar/psychosis, epilepsy. Home health aid at bedside reports patient is consuming 80 to 100% of meals. Denies any nausea/vomiting/diarrhea/constipation.  Reported no food allergies or intolerances. Aid unable to provide any history of weight loss or gain. Weight from previous admission (June 2018) weight: 195 pounds. Current weight: 183.8 pounds. Patient with edema- possible weight change due to fluid shifts. Pt updated on POCC. meds received from pharmacy. Pt remains in uncontrolled Afib, hypertensive, alert, skin pink and warm, continues to deny CP. Orders implemented

## 2018-10-30 NOTE — DIETITIAN INITIAL EVALUATION ADULT. - PROBLEM SELECTOR PLAN 5
Hx of LE edema w/ LE US wnl 07/2018 on furosemide  -continue on furosemide  -has risks for DVT but likely in the setting of passive congestion

## 2018-10-30 NOTE — DIETITIAN INITIAL EVALUATION ADULT. - ENERGY NEEDS
Ht: 63 inches Wt: 183.3 pounds BMI: 32.4 kg/m2 IBW: 115 pounds (+/-10%) %IBW: 159%    Edema: + 2 edema left/right hand, left/right wrist. +3 edema left/right foot, left/right ankle   Skin: intact, no pressure injuries

## 2018-10-31 DIAGNOSIS — E87.0 HYPEROSMOLALITY AND HYPERNATREMIA: ICD-10-CM

## 2018-10-31 LAB
ALBUMIN SERPL ELPH-MCNC: 3.6 G/DL — SIGNIFICANT CHANGE UP (ref 3.3–5)
ALP SERPL-CCNC: 128 U/L — HIGH (ref 40–120)
ALT FLD-CCNC: 19 U/L — SIGNIFICANT CHANGE UP (ref 4–33)
AMMONIA BLD-MCNC: 46 UMOL/L — SIGNIFICANT CHANGE UP (ref 11–55)
AST SERPL-CCNC: 21 U/L — SIGNIFICANT CHANGE UP (ref 4–32)
BACTERIA BLD CULT: SIGNIFICANT CHANGE UP
BACTERIA BLD CULT: SIGNIFICANT CHANGE UP
BASOPHILS # BLD AUTO: 0.04 K/UL — SIGNIFICANT CHANGE UP (ref 0–0.2)
BASOPHILS NFR BLD AUTO: 0.6 % — SIGNIFICANT CHANGE UP (ref 0–2)
BILIRUB SERPL-MCNC: 0.3 MG/DL — SIGNIFICANT CHANGE UP (ref 0.2–1.2)
BUN SERPL-MCNC: 11 MG/DL — SIGNIFICANT CHANGE UP (ref 7–23)
CALCIUM SERPL-MCNC: 8.9 MG/DL — SIGNIFICANT CHANGE UP (ref 8.4–10.5)
CHLORIDE SERPL-SCNC: 105 MMOL/L — SIGNIFICANT CHANGE UP (ref 98–107)
CO2 SERPL-SCNC: 32 MMOL/L — HIGH (ref 22–31)
CREAT SERPL-MCNC: 0.62 MG/DL — SIGNIFICANT CHANGE UP (ref 0.5–1.3)
EOSINOPHIL # BLD AUTO: 0.37 K/UL — SIGNIFICANT CHANGE UP (ref 0–0.5)
EOSINOPHIL NFR BLD AUTO: 5.8 % — SIGNIFICANT CHANGE UP (ref 0–6)
GLUCOSE SERPL-MCNC: 88 MG/DL — SIGNIFICANT CHANGE UP (ref 70–99)
HCT VFR BLD CALC: 40 % — SIGNIFICANT CHANGE UP (ref 34.5–45)
HGB BLD-MCNC: 12.7 G/DL — SIGNIFICANT CHANGE UP (ref 11.5–15.5)
IMM GRANULOCYTES # BLD AUTO: 0.13 # — SIGNIFICANT CHANGE UP
IMM GRANULOCYTES NFR BLD AUTO: 2 % — HIGH (ref 0–1.5)
LYMPHOCYTES # BLD AUTO: 1.91 K/UL — SIGNIFICANT CHANGE UP (ref 1–3.3)
LYMPHOCYTES # BLD AUTO: 29.8 % — SIGNIFICANT CHANGE UP (ref 13–44)
MCHC RBC-ENTMCNC: 31.8 % — LOW (ref 32–36)
MCHC RBC-ENTMCNC: 32.5 PG — SIGNIFICANT CHANGE UP (ref 27–34)
MCV RBC AUTO: 102.3 FL — HIGH (ref 80–100)
MONOCYTES # BLD AUTO: 0.84 K/UL — SIGNIFICANT CHANGE UP (ref 0–0.9)
MONOCYTES NFR BLD AUTO: 13.1 % — SIGNIFICANT CHANGE UP (ref 2–14)
NEUTROPHILS # BLD AUTO: 3.13 K/UL — SIGNIFICANT CHANGE UP (ref 1.8–7.4)
NEUTROPHILS NFR BLD AUTO: 48.7 % — SIGNIFICANT CHANGE UP (ref 43–77)
NRBC # FLD: 0 — SIGNIFICANT CHANGE UP
PLATELET # BLD AUTO: 176 K/UL — SIGNIFICANT CHANGE UP (ref 150–400)
PMV BLD: 10.6 FL — SIGNIFICANT CHANGE UP (ref 7–13)
POTASSIUM SERPL-MCNC: 4.1 MMOL/L — SIGNIFICANT CHANGE UP (ref 3.5–5.3)
POTASSIUM SERPL-SCNC: 4.1 MMOL/L — SIGNIFICANT CHANGE UP (ref 3.5–5.3)
PROT SERPL-MCNC: 6.9 G/DL — SIGNIFICANT CHANGE UP (ref 6–8.3)
RBC # BLD: 3.91 M/UL — SIGNIFICANT CHANGE UP (ref 3.8–5.2)
RBC # FLD: 14.6 % — HIGH (ref 10.3–14.5)
SODIUM SERPL-SCNC: 151 MMOL/L — HIGH (ref 135–145)
T3 SERPL-MCNC: 107 NG/DL — SIGNIFICANT CHANGE UP (ref 80–200)
T4 AB SER-ACNC: 6.98 UG/DL — SIGNIFICANT CHANGE UP (ref 5.1–13)
T4 FREE SERPL-MCNC: 0.93 NG/DL — SIGNIFICANT CHANGE UP (ref 0.9–1.8)
TSH SERPL-MCNC: 7.19 UIU/ML — HIGH (ref 0.27–4.2)
WBC # BLD: 6.42 K/UL — SIGNIFICANT CHANGE UP (ref 3.8–10.5)
WBC # FLD AUTO: 6.42 K/UL — SIGNIFICANT CHANGE UP (ref 3.8–10.5)

## 2018-10-31 PROCEDURE — 99233 SBSQ HOSP IP/OBS HIGH 50: CPT

## 2018-10-31 PROCEDURE — 99232 SBSQ HOSP IP/OBS MODERATE 35: CPT

## 2018-10-31 RX ORDER — SODIUM CHLORIDE 9 MG/ML
1000 INJECTION, SOLUTION INTRAVENOUS
Refills: 0 | Status: DISCONTINUED | OUTPATIENT
Start: 2018-10-31 | End: 2018-11-02

## 2018-10-31 RX ADMIN — ENOXAPARIN SODIUM 40 MILLIGRAM(S): 100 INJECTION SUBCUTANEOUS at 18:00

## 2018-10-31 RX ADMIN — Medication 104 MILLIGRAM(S): at 22:46

## 2018-10-31 RX ADMIN — LEVETIRACETAM 400 MILLIGRAM(S): 250 TABLET, FILM COATED ORAL at 05:09

## 2018-10-31 RX ADMIN — Medication 104 MILLIGRAM(S): at 06:26

## 2018-10-31 RX ADMIN — LEVETIRACETAM 400 MILLIGRAM(S): 250 TABLET, FILM COATED ORAL at 17:27

## 2018-10-31 RX ADMIN — PIPERACILLIN AND TAZOBACTAM 25 GRAM(S): 4; .5 INJECTION, POWDER, LYOPHILIZED, FOR SOLUTION INTRAVENOUS at 01:40

## 2018-10-31 RX ADMIN — SODIUM CHLORIDE 60 MILLILITER(S): 9 INJECTION, SOLUTION INTRAVENOUS at 22:46

## 2018-10-31 RX ADMIN — OLANZAPINE 5 MILLIGRAM(S): 15 TABLET, FILM COATED ORAL at 00:10

## 2018-10-31 RX ADMIN — Medication 1 DROP(S): at 05:09

## 2018-10-31 RX ADMIN — LACTULOSE 10 GRAM(S): 10 SOLUTION ORAL at 05:09

## 2018-10-31 RX ADMIN — SODIUM CHLORIDE 60 MILLILITER(S): 9 INJECTION, SOLUTION INTRAVENOUS at 11:12

## 2018-10-31 RX ADMIN — Medication 1 SPRAY(S): at 05:09

## 2018-10-31 RX ADMIN — LACTULOSE 10 GRAM(S): 10 SOLUTION ORAL at 22:03

## 2018-10-31 RX ADMIN — Medication 1 SPRAY(S): at 17:27

## 2018-10-31 NOTE — PROGRESS NOTE ADULT - PROBLEM SELECTOR PLAN 1
Improvement in mental status spontaneously.  Will continue to monitor.  Aspiration PNA and UTI and possible hepatic encephalopathy.  Changing meds to PO

## 2018-10-31 NOTE — PROGRESS NOTE ADULT - PROBLEM SELECTOR PLAN 6
Patient on several antiepileptics due to recent MICU for status epilepticus.   No signs of seizure activity.  Will change IV meds to PO tomorrow if patient tolerates.  - will follow up with neurology for recommendations.

## 2018-10-31 NOTE — PROGRESS NOTE ADULT - ASSESSMENT
53F admitted with acute metabolic encephalopathy complicated by aspiration PNA from dysphagia, UTI, elevated ammonia level, severe constipation, chronic hypercarbia, cerebral palsy - non-verbal, bipolar/psychosis, epilepsy - improving.

## 2018-10-31 NOTE — PROGRESS NOTE ADULT - ASSESSMENT
53 F with CP. non verbal, seizure disorder, constipation, was brought in initially for constipation and ?wheezing, was afebrile, normal WBC, hypercapnia on ABG, but negative urine and blood cx, CXR limited but no consolidation, abd/plevis CT also no acute pathology  ID was called now as pt was hypothermic  no tachycardia or hypotension, exam unremarkable normal WBC, but positive u/a with straight cath and pt with cough while eating    hypothermia due to infectious etiology like UTI or aspiration pneumonia vs central causes as it was occurring everyday while pt completely stable and on antibiotics  chest CT with ?aspiration material and urine cx with E-coli  still with hypothermia on antibiotics, likely the hypothermia itself is not due to infection      * today is day 7 of zosyn, can DC, competed the course for ?aspiration pneumonia and UTI  * improved mental status, doubt that hypothermia is due to infection  * will sign off, please call with questions

## 2018-10-31 NOTE — PROGRESS NOTE ADULT - SUBJECTIVE AND OBJECTIVE BOX
Follow Up:  hypothermia    Interval History: pt stable and improved mental status,  completed the antibiotic course    ROS:    Unobtainable because: nonverbal        Allergies  Topamax (Unknown)        ANTIMICROBIALS:      OTHER MEDS:  AQUAPHOR (petrolatum Ointment) 1 Application(s) Topical two times a day PRN  artificial tears (preservative free) Ophthalmic Solution 1 Drop(s) Both EYES two times a day  buDESOnide   0.5 milliGRAM(s) Respule 0.5 milliGRAM(s) Inhalation daily  clotrimazole 1% Cream 1 Application(s) Topical two times a day PRN  enoxaparin Injectable 40 milliGRAM(s) SubCutaneous every 24 hours  fluticasone propionate 50 MICROgram(s)/spray Nasal Spray 1 Spray(s) Both Nostrils two times a day  lactulose Syrup 10 Gram(s) Oral three times a day  levETIRAcetam  IVPB 1000 milliGRAM(s) IV Intermittent every 12 hours  nystatin Powder 1 Application(s) Topical two times a day PRN  OLANZapine Injectable 5 milliGRAM(s) IntraMuscular every 6 hours PRN  phenytoin  IVPB 100 milliGRAM(s) IV Intermittent at bedtime  phenytoin  IVPB 100 milliGRAM(s) IV Intermittent before breakfast  sodium chloride 0.45%. 1000 milliLiter(s) IV Continuous <Continuous>      Vital Signs Last 24 Hrs  T(C): 36.4 (31 Oct 2018 05:04), Max: 36.4 (31 Oct 2018 05:04)  T(F): 97.6 (31 Oct 2018 05:04), Max: 97.6 (31 Oct 2018 05:04)  HR: 74 (31 Oct 2018 05:04) (74 - 78)  BP: 128/75 (31 Oct 2018 05:04) (128/75 - 140/78)  BP(mean): --  RR: 18 (31 Oct 2018 05:04) (17 - 18)  SpO2: 96% (31 Oct 2018 05:04) (96% - 99%)    Physical Exam:  General:    NAD,  non toxic  Head: atraumatic, normocephalic  Eye: normal sclera and conjunctiva  ENT:    no oropharyngeal lesions,   no LAD,   neck supple  Cardio:     regular S1, S2,  no murmur  Respiratory:    clear b/l,    no wheezing  abd:     soft,   BS +,   no tenderness,    no organomegaly  :   no CVAT,  no suprapubic tenderness,   no  [place]  Musculoskeletal:   no joint swelling,   no edema  vascular: no lines, normal pulses  Skin:    no rash  Neurologic:  awake and moving,  nonverbal                          12.7   6.42  )-----------( 176      ( 31 Oct 2018 06:40 )             40.0       10-31    151<H>  |  105  |  11  ----------------------------<  88  4.1   |  32<H>  |  0.62    Ca    8.9      31 Oct 2018 06:40  Mg     1.5     10-30    TPro  6.9  /  Alb  3.6  /  TBili  0.3  /  DBili  x   /  AST  21  /  ALT  19  /  AlkPhos  128<H>  10-31          MICROBIOLOGY:  v  BLOOD  10-29-18 --  --  --      URINE CATHETER  10-26-18 --  --  Escherichia coli      BLOOD PERIPHERAL  10-26-18 --  --  --      BLOOD PERIPHERAL  10-26-18 --  --  --      URINE CATHETER  10-21-18 --  --  --      BLOOD  10-20-18 --  --  --      URINE CATHETER  10-17-18 --  --  --                RADIOLOGY:  Images below reviewed personally  < from: CT Chest No Cont (10.26.18 @ 21:54) >  IMPRESSION:    Left lower lobe atelectasis and volume loss with without superimposed   pneumonia. High density within this opacity may represent aspirated   material.    Bilateral lower lobe groundglass nodular opacities are new from the prior   study and may be infectious/inflammatory.    Three-month follow-up CT needed for complete evaluation.

## 2018-10-31 NOTE — PROGRESS NOTE ADULT - SUBJECTIVE AND OBJECTIVE BOX
CC: F/U for encephalopathy    SUBJECTIVE / OVERNIGHT EVENTS:  Continues to be at mental status at baseline at the group home.  Tolerating PO intake well.  No F/C, N/V, CP, SOB, Cough, diarrhea, dysuria.    MEDICATIONS  (STANDING):  artificial tears (preservative free) Ophthalmic Solution 1 Drop(s) Both EYES two times a day  buDESOnide   0.5 milliGRAM(s) Respule 0.5 milliGRAM(s) Inhalation daily  enoxaparin Injectable 40 milliGRAM(s) SubCutaneous every 24 hours  fluticasone propionate 50 MICROgram(s)/spray Nasal Spray 1 Spray(s) Both Nostrils two times a day  lactulose Syrup 10 Gram(s) Oral three times a day  levETIRAcetam  IVPB 1000 milliGRAM(s) IV Intermittent every 12 hours  phenytoin  IVPB 100 milliGRAM(s) IV Intermittent at bedtime  phenytoin  IVPB 100 milliGRAM(s) IV Intermittent before breakfast  sodium chloride 0.45%. 1000 milliLiter(s) (60 mL/Hr) IV Continuous <Continuous>    MEDICATIONS  (PRN):  AQUAPHOR (petrolatum Ointment) 1 Application(s) Topical two times a day PRN sacrum rash  clotrimazole 1% Cream 1 Application(s) Topical two times a day PRN rash  nystatin Powder 1 Application(s) Topical two times a day PRN erythema of sacrum  OLANZapine Injectable 5 milliGRAM(s) IntraMuscular every 6 hours PRN agitation      Vital Signs Last 24 Hrs  T(C): 36.4 (31 Oct 2018 05:04), Max: 36.4 (31 Oct 2018 05:04)  T(F): 97.6 (31 Oct 2018 05:04), Max: 97.6 (31 Oct 2018 05:04)  HR: 74 (31 Oct 2018 05:04) (74 - 78)  BP: 128/75 (31 Oct 2018 05:04) (128/75 - 140/78)  BP(mean): --  RR: 18 (31 Oct 2018 05:04) (17 - 18)  SpO2: 96% (31 Oct 2018 05:04) (96% - 98%)  CAPILLARY BLOOD GLUCOSE        I&O's Summary      PHYSICAL EXAM:  GENERAL: in bed, non-verbal, NAD, marked improvement in awareness compared to yesterday.  HEENT: anicteric sclera  CHEST/LUNG: Clear to percussion bilaterally; No rales, rhonchi, wheezing, or rubs.   HEART: Regular rate and rhythm; No murmurs, rubs, or gallops  ABDOMEN: Soft, Nontender, Nondistended; Bowel sounds present.    EXTREMITIES:  contraction of b/l hands. +2 edema feet, no edema of shins.   SKIN: No rashes or lesions  PSYCH: not actively participating.  NEURO- Alert and aware, pupils reactive Unable to assess further as pt does not follow commands     LABS:                        12.7   6.42  )-----------( 176      ( 31 Oct 2018 06:40 )             40.0     10-31    151<H>  |  105  |  11  ----------------------------<  88  4.1   |  32<H>  |  0.62    Ca    8.9      31 Oct 2018 06:40  Mg     1.5     10-30    TPro  6.9  /  Alb  3.6  /  TBili  0.3  /  DBili  x   /  AST  21  /  ALT  19  /  AlkPhos  128<H>  10-31      Ammonia, Serum (10.31.18 @ 06:40)    Ammonia, Serum: 46: Ammonia levels will be falsely elevated if specimen is NOT  collected, processed and maintained at 4-8 C. umol/L            RADIOLOGY & ADDITIONAL TESTS:    Imaging Personally Reviewed:    Care Discussed with Consultants/Other Providers:

## 2018-10-31 NOTE — PROGRESS NOTE ADULT - NSHPATTENDINGPLANDISCUSS_GEN_ALL_CORE
the primary team
the medicine attending
the primary team
aide
Julia at bedside and PA
Candace Aid at bedside and PA/ Radiologist

## 2018-11-01 ENCOUNTER — TRANSCRIPTION ENCOUNTER (OUTPATIENT)
Age: 53
End: 2018-11-01

## 2018-11-01 LAB
AMMONIA BLD-MCNC: 54 UMOL/L — SIGNIFICANT CHANGE UP (ref 11–55)
BUN SERPL-MCNC: 9 MG/DL — SIGNIFICANT CHANGE UP (ref 7–23)
CALCIUM SERPL-MCNC: 8.8 MG/DL — SIGNIFICANT CHANGE UP (ref 8.4–10.5)
CHLORIDE SERPL-SCNC: 105 MMOL/L — SIGNIFICANT CHANGE UP (ref 98–107)
CO2 SERPL-SCNC: 32 MMOL/L — HIGH (ref 22–31)
CREAT SERPL-MCNC: 0.54 MG/DL — SIGNIFICANT CHANGE UP (ref 0.5–1.3)
GLUCOSE SERPL-MCNC: 106 MG/DL — HIGH (ref 70–99)
POTASSIUM SERPL-MCNC: 3.5 MMOL/L — SIGNIFICANT CHANGE UP (ref 3.5–5.3)
POTASSIUM SERPL-SCNC: 3.5 MMOL/L — SIGNIFICANT CHANGE UP (ref 3.5–5.3)
SODIUM SERPL-SCNC: 147 MMOL/L — HIGH (ref 135–145)

## 2018-11-01 PROCEDURE — 99233 SBSQ HOSP IP/OBS HIGH 50: CPT

## 2018-11-01 RX ORDER — PANTOPRAZOLE SODIUM 20 MG/1
40 TABLET, DELAYED RELEASE ORAL
Refills: 0 | Status: DISCONTINUED | OUTPATIENT
Start: 2018-11-01 | End: 2018-11-02

## 2018-11-01 RX ORDER — BENZTROPINE MESYLATE 1 MG
0.5 TABLET ORAL
Refills: 0 | Status: DISCONTINUED | OUTPATIENT
Start: 2018-11-01 | End: 2018-11-02

## 2018-11-01 RX ORDER — DIVALPROEX SODIUM 500 MG/1
500 TABLET, DELAYED RELEASE ORAL DAILY
Refills: 0 | Status: DISCONTINUED | OUTPATIENT
Start: 2018-11-01 | End: 2018-11-02

## 2018-11-01 RX ORDER — FOLIC ACID 0.8 MG
1 TABLET ORAL DAILY
Refills: 0 | Status: DISCONTINUED | OUTPATIENT
Start: 2018-11-01 | End: 2018-11-02

## 2018-11-01 RX ORDER — DOCUSATE SODIUM 100 MG
100 CAPSULE ORAL THREE TIMES A DAY
Refills: 0 | Status: DISCONTINUED | OUTPATIENT
Start: 2018-11-01 | End: 2018-11-02

## 2018-11-01 RX ORDER — FERROUS SULFATE 325(65) MG
325 TABLET ORAL DAILY
Refills: 0 | Status: DISCONTINUED | OUTPATIENT
Start: 2018-11-01 | End: 2018-11-02

## 2018-11-01 RX ORDER — LEVETIRACETAM 250 MG/1
1000 TABLET, FILM COATED ORAL
Refills: 0 | Status: DISCONTINUED | OUTPATIENT
Start: 2018-11-01 | End: 2018-11-02

## 2018-11-01 RX ORDER — ASPIRIN/CALCIUM CARB/MAGNESIUM 324 MG
81 TABLET ORAL DAILY
Refills: 0 | Status: DISCONTINUED | OUTPATIENT
Start: 2018-11-01 | End: 2018-11-02

## 2018-11-01 RX ORDER — CARBAMAZEPINE 200 MG
100 TABLET ORAL DAILY
Refills: 0 | Status: DISCONTINUED | OUTPATIENT
Start: 2018-11-01 | End: 2018-11-02

## 2018-11-01 RX ORDER — CARBAMAZEPINE 200 MG
400 TABLET ORAL
Refills: 0 | Status: DISCONTINUED | OUTPATIENT
Start: 2018-11-01 | End: 2018-11-02

## 2018-11-01 RX ORDER — OLANZAPINE 15 MG/1
7.5 TABLET, FILM COATED ORAL DAILY
Refills: 0 | Status: DISCONTINUED | OUTPATIENT
Start: 2018-11-01 | End: 2018-11-02

## 2018-11-01 RX ADMIN — Medication 0.5 MILLIGRAM(S): at 07:29

## 2018-11-01 RX ADMIN — Medication 400 MILLIGRAM(S): at 18:30

## 2018-11-01 RX ADMIN — Medication 1 MILLIGRAM(S): at 14:31

## 2018-11-01 RX ADMIN — LEVETIRACETAM 1000 MILLIGRAM(S): 250 TABLET, FILM COATED ORAL at 18:30

## 2018-11-01 RX ADMIN — Medication 1 SPRAY(S): at 18:29

## 2018-11-01 RX ADMIN — Medication 325 MILLIGRAM(S): at 14:31

## 2018-11-01 RX ADMIN — Medication 0.5 MILLIGRAM(S): at 18:30

## 2018-11-01 RX ADMIN — LEVETIRACETAM 400 MILLIGRAM(S): 250 TABLET, FILM COATED ORAL at 05:34

## 2018-11-01 RX ADMIN — ENOXAPARIN SODIUM 40 MILLIGRAM(S): 100 INJECTION SUBCUTANEOUS at 18:38

## 2018-11-01 RX ADMIN — Medication 1 APPLICATION(S): at 05:34

## 2018-11-01 RX ADMIN — Medication 81 MILLIGRAM(S): at 14:31

## 2018-11-01 RX ADMIN — Medication 104 MILLIGRAM(S): at 06:21

## 2018-11-01 RX ADMIN — Medication 100 MILLIGRAM(S): at 18:30

## 2018-11-01 RX ADMIN — OLANZAPINE 7.5 MILLIGRAM(S): 15 TABLET, FILM COATED ORAL at 14:32

## 2018-11-01 RX ADMIN — Medication 100 MILLIGRAM(S): at 22:03

## 2018-11-01 RX ADMIN — Medication 100 MILLIGRAM(S): at 14:31

## 2018-11-01 RX ADMIN — DIVALPROEX SODIUM 500 MILLIGRAM(S): 500 TABLET, DELAYED RELEASE ORAL at 14:32

## 2018-11-01 RX ADMIN — LACTULOSE 10 GRAM(S): 10 SOLUTION ORAL at 05:34

## 2018-11-01 RX ADMIN — Medication 1 DROP(S): at 18:31

## 2018-11-01 RX ADMIN — Medication 1 SPRAY(S): at 05:35

## 2018-11-01 NOTE — PROGRESS NOTE ADULT - PROBLEM SELECTOR PLAN 7
Patient quadriplegia and AOx0, nonverbal baseline  -continue home medications  - discussed case with group home for probably medical optimization for discharge in AM.

## 2018-11-01 NOTE — DISCHARGE NOTE ADULT - HOSPITAL COURSE
Dx: obstipation resolved     The patient is a 53 year old female with CP (a/w severe ID, baseline non verbal, does not ambulate, however can feed herself, follows some commands), intractable seizures who presents with wheezing, constipation and edema of LE per aid at bedside. Patient with severe constipation and now sedated due to agitation. - had BM on  10/20   Pt. from group home    +funtional quad  + constipation/ obstipation-on bowel regimen improved   + h/o Sz - neuro following - IV meds  + vaginal bleeding ( stable)- gyn rec outpt f/up  + Acute metabolic encephalopathy  + Aspiration PNA (hypothermia) - s/p 7 days zosyn (ID On Board)   + UTI s/p zosyn  + hypernatremia on 1/2 NS   10/28 Temp 93.6 [place] Ordered   10/19- Pelvic U/S- Limited examination. No evident uterine abnormality. Consider MRI of the pelvis.  10/21 EEG: Clinical Impression: Moderate nonspecific diffuse or multifocal cerebral dysfunction. There were no epileptiform abnormalities recorded.    10/21 HCT: No obvious acute hemorrhage or mass effect; however markedly limited  examination secondary to motion artifact.  10/21 Bld Cx NEG    UA NEG   Urine Cx: NEG  10/23 ABG-improved hypercarbia, otherwise negative    10/21 - no UO, bladder scan --> 328cc , straight cath -> 350cc- now voiding  10/26 CT chest: Left lower lobe atelectasis and volume loss with without superimposed   pneumonia. High density within this opacity may represent aspirated material.  Bilateral lower lobe groundglass nodular opacities are new from the prior study and may be infectious/inflammatory.Three-month follow-up CT needed for complete evaluation.  Blood cx neg x 48 hrs  10/29 10/29:Cinesophagram-  Laryngeal penetration for thin liquids without retrieval.   Laryngeal penetration for nectar thick liquids with retrieval. Limited evaluation for aspiration. A full report will be issued by the department of speech and hearing.   10/29

## 2018-11-01 NOTE — DISCHARGE NOTE ADULT - CARE PROVIDER_API CALL
beryl Berg  Phone: [phone]  Fax: [phone]   )    -    Schoenberg, Laura G (DO), Neurology  [address] [place] [address]  Phone: [phone]  Fax: [phone]    Tejal Perry), Internal Medicine  [address] [place] [address]  Phone: [phone]  Fax: [phone]

## 2018-11-01 NOTE — DISCHARGE NOTE ADULT - SECONDARY DIAGNOSIS.
Cerebral palsy Functional quadriplegia Aspiration pneumonia, unspecified aspiration pneumonia type, unspecified laterality, unspecified part of lung Encephalopathy Hypothermia, initial encounter Hypernatremia

## 2018-11-01 NOTE — DISCHARGE NOTE ADULT - PATIENT PORTAL LINK FT
You can access the [email or web address] Portal, offered by [facility], by registering with the following website: [email or web address]

## 2018-11-01 NOTE — DISCHARGE NOTE ADULT - CARE PROVIDERS DIRECT ADDRESSES
,DirectAddress_Unknown,[email or web address]

## 2018-11-01 NOTE — PROGRESS NOTE ADULT - SUBJECTIVE AND OBJECTIVE BOX
CC: F/U for encephalopathy    SUBJECTIVE / OVERNIGHT EVENTS:  Patient still alert - similar to her baseline at the group home.  No overnight F/C, N/V, CP, SOB, Cough, lightheadedness, dizziness, abdominal pain, diarrhea, dysuria.    MEDICATIONS  (STANDING):  artificial tears (preservative free) Ophthalmic Solution 1 Drop(s) Both EYES two times a day  aspirin  chewable 81 milliGRAM(s) Oral daily  benztropine 0.5 milliGRAM(s) Oral two times a day  buDESOnide   0.5 milliGRAM(s) Respule 0.5 milliGRAM(s) Inhalation daily  carBAMazepine XR Tablet 400 milliGRAM(s) Oral two times a day  carBAMazepine XR Tablet 100 milliGRAM(s) Oral daily  diVALproex  milliGRAM(s) Oral daily  docusate sodium 100 milliGRAM(s) Oral three times a day  enoxaparin Injectable 40 milliGRAM(s) SubCutaneous every 24 hours  ferrous    sulfate 325 milliGRAM(s) Oral daily  fluticasone propionate 50 MICROgram(s)/spray Nasal Spray 1 Spray(s) Both Nostrils two times a day  folic acid 1 milliGRAM(s) Oral daily  levETIRAcetam 1000 milliGRAM(s) Oral two times a day  OLANZapine 7.5 milliGRAM(s) Oral daily  pantoprazole    Tablet 40 milliGRAM(s) Oral before breakfast  phenytoin   Capsule 100 milliGRAM(s) Oral two times a day  sodium chloride 0.45%. 1000 milliLiter(s) (60 mL/Hr) IV Continuous <Continuous>    MEDICATIONS  (PRN):  AQUAPHOR (petrolatum Ointment) 1 Application(s) Topical two times a day PRN sacrum rash  clotrimazole 1% Cream 1 Application(s) Topical two times a day PRN rash  nystatin Powder 1 Application(s) Topical two times a day PRN erythema of sacrum      Vital Signs Last 24 Hrs  T(C): 36.3 (01 Nov 2018 12:26), Max: 36.4 (31 Oct 2018 22:01)  T(F): 97.3 (01 Nov 2018 12:26), Max: 97.5 (31 Oct 2018 22:01)  HR: 78 (01 Nov 2018 12:26) (73 - 80)  BP: 154/75 (01 Nov 2018 12:26) (134/74 - 154/75)  BP(mean): --  RR: 18 (01 Nov 2018 12:26) (16 - 18)  SpO2: 96% (01 Nov 2018 12:26) (94% - 97%)  CAPILLARY BLOOD GLUCOSE        I&O's Summary      PHYSICAL EXAM:  GENERAL: in bed, non-verbal, NAD, marked improvement in awareness compared to yesterday.  HEENT: anicteric sclera  CHEST/LUNG: Clear to percussion bilaterally; No rales, rhonchi, wheezing, or rubs.   HEART: Regular rate and rhythm; No murmurs, rubs, or gallops  ABDOMEN: Soft, Nontender, Nondistended; Bowel sounds present.    EXTREMITIES:  contraction of b/l hands. +2 edema feet, no edema of shins.   SKIN: No rashes or lesions  PSYCH: not actively participating.  NEURO- Alert and aware, pupils reactive Unable to assess further as pt does not follow commands     LABS:                        12.7   6.42  )-----------( 176      ( 31 Oct 2018 06:40 )             40.0     11-01    147<H>  |  105  |  9   ----------------------------<  106<H>  3.5   |  32<H>  |  0.54    Ca    8.8      01 Nov 2018 11:25    TPro  6.9  /  Alb  3.6  /  TBili  0.3  /  DBili  x   /  AST  21  /  ALT  19  /  AlkPhos  128<H>  10-31              RADIOLOGY & ADDITIONAL TESTS:    Imaging Personally Reviewed:    Care Discussed with Consultants/Other Providers:

## 2018-11-01 NOTE — DISCHARGE NOTE ADULT - PROVIDER TOKENS
FREE:[LAST:[Morena],FIRST:[beryl],PHONE:[phone]],FAX:[(   [phone]    -]],TOKEN:'[id number]',TOKEN:'[id number]'

## 2018-11-01 NOTE — DISCHARGE NOTE ADULT - PLAN OF CARE
Follow with PMD Resolution of symptoms Follow with PMD within 1 week. Call for appointment  Take medications as prescribed Follow with PMD. Monitor your Na level

## 2018-11-01 NOTE — DISCHARGE NOTE ADULT - MEDICATION SUMMARY - MEDICATIONS TO CHANGE
I will SWITCH the dose or number of times a day I take the medications listed below when I get home from the hospital:    divalproex sodium 500 mg oral delayed release tablet  -- 1 tab(s) by mouth 2 times a day

## 2018-11-01 NOTE — DISCHARGE NOTE ADULT - ADDITIONAL INSTRUCTIONS
Follow with PMD within 1 week. Call for appointment  Take medications as prescribed  Also follow up with neurology and infectious disease MDs within 1 week. Follow with PMD within 1 week. Call for appointment  Take medications as prescribed  Also follow up with neurology Dr Schoenberg  within 1 week. Call for appointment  Follow up with ID Dr Perry within 1 week. Call for appointment  Monitor your Na level and review with your PMD.

## 2018-11-01 NOTE — DISCHARGE NOTE ADULT - MEDICATION SUMMARY - MEDICATIONS TO STOP TAKING
I will STOP taking the medications listed below when I get home from the hospital:    Fleet Enema 7 g-19 g rectal enema  -- 133 milliliter(s) rectally once a day, As Needed    lactulose 10 g/15 mL oral solution  -- 15 milliliter(s) by mouth 2 times a day, As Needed    albuterol 2.5 mg/3 mL (0.083%) inhalation solution  -- 3 milliliter(s) inhaled every 6 hours, As Needed    NexIUM 20 mg oral delayed release capsule  -- 1 cap(s) by mouth once a day    clindamycin 1% topical lotion  -- Apply on skin to affected area once a day, As Needed    metroNIDAZOLE 1% topical gel  -- Apply on skin to affected area once a day, As Needed    Robitussin Cough+Chest Simon DM 10 mg-200 mg oral capsule  -- 2 cap(s) by mouth every 6 hours, As Needed    Calcium 600+D 600 mg-200 intl units oral tablet  -- 1 tab(s) by mouth 2 times a day    Vitamin B1 100 mg oral tablet  -- 1 tab(s) by mouth once a day    phenytoin 25 mg/mL oral suspension  -- 4mL in the morning and 12 milliliter(s) by mouth at bedtime    furosemide 40 mg oral tablet  -- 1 tab(s) by mouth once a day

## 2018-11-01 NOTE — DISCHARGE NOTE ADULT - CARE PLAN
Principal Discharge DX:	Obstipation  Goal:	Resolution of symptoms  Assessment and plan of treatment:	Follow with PMD within 1 week. Call for appointment  Take medications as prescribed  Secondary Diagnosis:	Aspiration pneumonia, unspecified aspiration pneumonia type, unspecified laterality, unspecified part of lung  Assessment and plan of treatment:	Follow with PMD  Secondary Diagnosis:	Cerebral palsy  Assessment and plan of treatment:	Follow with PMD  Secondary Diagnosis:	Encephalopathy  Assessment and plan of treatment:	Follow with PMD  Secondary Diagnosis:	Functional quadriplegia  Assessment and plan of treatment:	Follow with PMD  Secondary Diagnosis:	Hypernatremia  Assessment and plan of treatment:	Follow with PMD  Secondary Diagnosis:	Hypothermia, initial encounter  Assessment and plan of treatment:	Follow with PMD Principal Discharge DX:	Obstipation  Goal:	Resolution of symptoms  Assessment and plan of treatment:	Follow with PMD within 1 week. Call for appointment  Take medications as prescribed  Secondary Diagnosis:	Aspiration pneumonia, unspecified aspiration pneumonia type, unspecified laterality, unspecified part of lung  Assessment and plan of treatment:	Follow with PMD  Secondary Diagnosis:	Cerebral palsy  Assessment and plan of treatment:	Follow with PMD  Secondary Diagnosis:	Encephalopathy  Assessment and plan of treatment:	Follow with PMD  Secondary Diagnosis:	Functional quadriplegia  Assessment and plan of treatment:	Follow with PMD  Secondary Diagnosis:	Hypernatremia  Assessment and plan of treatment:	Follow with PMD. Monitor your Na level  Secondary Diagnosis:	Hypothermia, initial encounter  Assessment and plan of treatment:	Follow with PMD

## 2018-11-01 NOTE — PROGRESS NOTE ADULT - PROBLEM SELECTOR PLAN 2
Changed IVF to 1/2NS.  Na level improving. Encourage free water intake.  MOnitor Na. Anticipate to discontinue IVF tomorrow.

## 2018-11-01 NOTE — DISCHARGE NOTE ADULT - MEDICATION SUMMARY - MEDICATIONS TO TAKE
I will START or STAY ON the medications listed below when I get home from the hospital:    budesonide 0.5 mg/2 mL inhalation suspension  -- 1 puff(s) inhaled once a day  -- Indication: For Chronic hypercapnic respiratory failure    aspirin 81 mg oral tablet, chewable  -- 1 tab(s) by mouth once a day  -- Indication: For Cardiac preventive    phenytoin 100 mg oral capsule, extended release  -- 1 cap(s) by mouth 2 times a day  -- Indication: For Seizure disorder    levETIRAcetam 1000 mg oral tablet  -- 1 tab(s) by mouth 2 times a day  -- Indication: For Seizure disorder    carBAMazepine 400 mg oral tablet, extended release  -- 1 tab(s) by mouth 2 times a day  -- Indication: For Seizure disorder    carBAMazepine 100 mg oral tablet, extended release  -- 1 tab(s) by mouth once a day  -- Indication: For Seizure disorder    divalproex sodium 500 mg oral tablet, extended release  -- 1 tab(s) by mouth once a day  -- Indication: For Seizure disorder    benztropine 0.5 mg oral tablet  -- 1 tab(s) by mouth 2 times a day  -- Indication: For Neuro med    OLANZapine 7.5 mg oral tablet  -- 1 tab(s) by mouth once a day  -- Indication: For Need for prophylactic measure    petrolatum topical ointment  -- 1 application on skin 2 times a day, As needed, sacrum rash  -- Indication: For Rash    nystatin 100,000 units/g topical powder  -- 1 application on skin 2 times a day, As needed, erythema of sacrum  -- Indication: For Rash    clotrimazole 1% topical cream  -- 1 application on skin 2 times a day, As needed, rash  -- Indication: For rash    ferrous sulfate 325 mg (65 mg elemental iron) oral tablet  -- 1 tab(s) by mouth once a day  -- Indication: For Iron Supplement    docusate sodium 100 mg oral capsule  -- 1 cap(s) by mouth 3 times a day  -- Indication: For Constipation    fluticasone 50 mcg/inh nasal spray  -- 1 spray(s) into nose 2 times a day  -- Indication: For Chronic hypercapnic respiratory failure    ocular lubricant ophthalmic solution  -- 1 drop(s) to each affected eye 2 times a day  -- Indication: For Eye drop    pantoprazole 40 mg oral delayed release tablet  -- 1 tab(s) by mouth once a day (before a meal)  -- Indication: For GI prophylaxis    Multiple Vitamins oral tablet  -- 1 tab(s) by mouth once a day  -- Indication: For Supplement    folic acid 1 mg oral tablet  -- 1 tab(s) by mouth once a day  -- Indication: For Supplement I will START or STAY ON the medications listed below when I get home from the hospital:    budesonide 0.5 mg/2 mL inhalation suspension  -- 1 puff(s) inhaled once a day  -- Indication: For Chronic hypercapnic respiratory failure    aspirin 81 mg oral tablet, chewable  -- 1 tab(s) by mouth once a day  -- Indication: For Cardiac preventive    phenytoin 100 mg oral capsule, extended release  -- 1 cap(s) by mouth 2 times a day  -- Indication: For Seizure disorders    divalproex sodium 500 mg oral tablet, extended release  -- 1 tab(s) by mouth once a day  -- Indication: For Seizure disorder    levETIRAcetam 1000 mg oral tablet  -- 1 tab(s) by mouth 2 times a day  -- Indication: For Seizure disorder    carBAMazepine 100 mg oral tablet, extended release  -- 1 tab(s) by mouth once a day  -- Indication: For Seizure disorder    carBAMazepine 400 mg oral tablet, extended release  -- 1 tab(s) by mouth 2 times a day  -- Indication: For Seizure disorder    benztropine 0.5 mg oral tablet  -- 1 tab(s) by mouth 2 times a day  -- Indication: For Neuro med    OLANZapine 7.5 mg oral tablet  -- 1 tab(s) by mouth once a day  -- Indication: For psych med    nystatin 100,000 units/g topical powder  -- 1 application on skin 2 times a day, As needed, erythema of sacrum  -- Indication: For rash    petrolatum topical ointment  -- 1 application on skin 2 times a day, As needed, sacrum rash  -- Indication: For Rash    clotrimazole 1% topical cream  -- 1 application on skin 2 times a day, As needed, rash  -- Indication: For rash    ferrous sulfate 325 mg (65 mg elemental iron) oral tablet  -- 1 tab(s) by mouth once a day  -- Indication: For iron supplement    docusate sodium 100 mg oral capsule  -- 1 cap(s) by mouth 3 times a day  -- Indication: For Constipation    fluticasone 50 mcg/inh nasal spray  -- 1 spray(s) into nose 2 times a day  -- Indication: For Chronic hypercapnic respiratory failure    ocular lubricant ophthalmic solution  -- 1 drop(s) to each affected eye 2 times a day  -- Indication: For Eye drop    pantoprazole 40 mg oral delayed release tablet  -- 1 tab(s) by mouth once a day (before a meal)  -- Indication: For GI prophylactic    Multiple Vitamins oral tablet  -- 1 tab(s) by mouth once a day  -- Indication: For Supplement    folic acid 1 mg oral tablet  -- 1 tab(s) by mouth once a day  -- Indication: For Supplement

## 2018-11-02 VITALS
RESPIRATION RATE: 18 BRPM | DIASTOLIC BLOOD PRESSURE: 85 MMHG | SYSTOLIC BLOOD PRESSURE: 134 MMHG | HEART RATE: 73 BPM | OXYGEN SATURATION: 96 % | TEMPERATURE: 97 F

## 2018-11-02 PROCEDURE — 99239 HOSP IP/OBS DSCHRG MGMT >30: CPT

## 2018-11-02 RX ORDER — FOLIC ACID 0.8 MG
1 TABLET ORAL
Qty: 30 | Refills: 0
Start: 2018-11-02 | End: 2018-12-01

## 2018-11-02 RX ORDER — CARBAMAZEPINE 200 MG
1 TABLET ORAL
Qty: 0 | Refills: 0 | DISCHARGE
Start: 2018-11-02

## 2018-11-02 RX ORDER — BENZTROPINE MESYLATE 1 MG
1 TABLET ORAL
Qty: 60 | Refills: 0
Start: 2018-11-02 | End: 2018-12-01

## 2018-11-02 RX ORDER — CARBAMAZEPINE 200 MG
1 TABLET ORAL
Qty: 60 | Refills: 0
Start: 2018-11-02 | End: 2018-12-01

## 2018-11-02 RX ORDER — LEVETIRACETAM 250 MG/1
1 TABLET, FILM COATED ORAL
Qty: 60 | Refills: 0
Start: 2018-11-02 | End: 2018-12-01

## 2018-11-02 RX ORDER — PETROLATUM,WHITE
1 JELLY (GRAM) TOPICAL
Qty: 0 | Refills: 0 | DISCHARGE
Start: 2018-11-02

## 2018-11-02 RX ORDER — PANTOPRAZOLE SODIUM 20 MG/1
1 TABLET, DELAYED RELEASE ORAL
Qty: 30 | Refills: 0
Start: 2018-11-02 | End: 2018-12-01

## 2018-11-02 RX ORDER — FLUTICASONE PROPIONATE 50 MCG
1 SPRAY, SUSPENSION NASAL
Qty: 1 | Refills: 0
Start: 2018-11-02 | End: 2018-12-01

## 2018-11-02 RX ORDER — ASPIRIN/CALCIUM CARB/MAGNESIUM 324 MG
1 TABLET ORAL
Qty: 30 | Refills: 0
Start: 2018-11-02 | End: 2018-12-01

## 2018-11-02 RX ORDER — DOCUSATE SODIUM 100 MG
1 CAPSULE ORAL
Qty: 0 | Refills: 0 | DISCHARGE
Start: 2018-11-02

## 2018-11-02 RX ORDER — OLANZAPINE 15 MG/1
1 TABLET, FILM COATED ORAL
Qty: 0 | Refills: 0 | DISCHARGE
Start: 2018-11-02

## 2018-11-02 RX ORDER — DIVALPROEX SODIUM 500 MG/1
1 TABLET, DELAYED RELEASE ORAL
Qty: 30 | Refills: 0
Start: 2018-11-02 | End: 2018-12-01

## 2018-11-02 RX ORDER — DOCUSATE SODIUM 100 MG
1 CAPSULE ORAL
Qty: 90 | Refills: 0
Start: 2018-11-02 | End: 2018-12-01

## 2018-11-02 RX ORDER — LEVETIRACETAM 250 MG/1
1 TABLET, FILM COATED ORAL
Qty: 0 | Refills: 0 | DISCHARGE
Start: 2018-11-02

## 2018-11-02 RX ORDER — ASPIRIN/CALCIUM CARB/MAGNESIUM 324 MG
1 TABLET ORAL
Qty: 0 | Refills: 0 | DISCHARGE
Start: 2018-11-02

## 2018-11-02 RX ORDER — PANTOPRAZOLE SODIUM 20 MG/1
1 TABLET, DELAYED RELEASE ORAL
Qty: 0 | Refills: 0 | DISCHARGE
Start: 2018-11-02

## 2018-11-02 RX ORDER — FOLIC ACID 0.8 MG
1 TABLET ORAL
Qty: 0 | Refills: 0 | DISCHARGE
Start: 2018-11-02

## 2018-11-02 RX ORDER — OLANZAPINE 15 MG/1
1 TABLET, FILM COATED ORAL
Qty: 30 | Refills: 0
Start: 2018-11-02 | End: 2018-12-01

## 2018-11-02 RX ORDER — CARBAMAZEPINE 200 MG
1 TABLET ORAL
Qty: 30 | Refills: 0
Start: 2018-11-02 | End: 2018-12-01

## 2018-11-02 RX ORDER — FERROUS SULFATE 325(65) MG
1 TABLET ORAL
Qty: 30 | Refills: 0
Start: 2018-11-02 | End: 2018-12-01

## 2018-11-02 RX ORDER — BUDESONIDE, MICRONIZED 100 %
1 POWDER (GRAM) MISCELLANEOUS
Qty: 0 | Refills: 0 | DISCHARGE
Start: 2018-11-02

## 2018-11-02 RX ORDER — NYSTATIN CREAM 100000 [USP'U]/G
1 CREAM TOPICAL
Qty: 1 | Refills: 0
Start: 2018-11-02 | End: 2018-12-01

## 2018-11-02 RX ORDER — BENZTROPINE MESYLATE 1 MG
1 TABLET ORAL
Qty: 0 | Refills: 0 | DISCHARGE
Start: 2018-11-02

## 2018-11-02 RX ORDER — BUDESONIDE, MICRONIZED 100 %
1 POWDER (GRAM) MISCELLANEOUS
Qty: 1 | Refills: 0
Start: 2018-11-02 | End: 2018-12-01

## 2018-11-02 RX ORDER — NYSTATIN CREAM 100000 [USP'U]/G
1 CREAM TOPICAL
Qty: 0 | Refills: 0 | DISCHARGE
Start: 2018-11-02

## 2018-11-02 RX ORDER — DIVALPROEX SODIUM 500 MG/1
1 TABLET, DELAYED RELEASE ORAL
Qty: 0 | Refills: 0 | DISCHARGE
Start: 2018-11-02

## 2018-11-02 RX ORDER — FLUTICASONE PROPIONATE 50 MCG
1 SPRAY, SUSPENSION NASAL
Qty: 0 | Refills: 0 | DISCHARGE
Start: 2018-11-02

## 2018-11-02 RX ADMIN — DIVALPROEX SODIUM 500 MILLIGRAM(S): 500 TABLET, DELAYED RELEASE ORAL at 12:27

## 2018-11-02 RX ADMIN — Medication 0.5 MILLIGRAM(S): at 05:41

## 2018-11-02 RX ADMIN — SODIUM CHLORIDE 60 MILLILITER(S): 9 INJECTION, SOLUTION INTRAVENOUS at 06:52

## 2018-11-02 RX ADMIN — Medication 100 MILLIGRAM(S): at 12:27

## 2018-11-02 RX ADMIN — Medication 1 DROP(S): at 05:41

## 2018-11-02 RX ADMIN — Medication 325 MILLIGRAM(S): at 12:27

## 2018-11-02 RX ADMIN — LEVETIRACETAM 1000 MILLIGRAM(S): 250 TABLET, FILM COATED ORAL at 05:41

## 2018-11-02 RX ADMIN — Medication 100 MILLIGRAM(S): at 12:29

## 2018-11-02 RX ADMIN — PANTOPRAZOLE SODIUM 40 MILLIGRAM(S): 20 TABLET, DELAYED RELEASE ORAL at 05:42

## 2018-11-02 RX ADMIN — Medication 1 SPRAY(S): at 05:42

## 2018-11-02 RX ADMIN — Medication 1 MILLIGRAM(S): at 12:27

## 2018-11-02 RX ADMIN — OLANZAPINE 7.5 MILLIGRAM(S): 15 TABLET, FILM COATED ORAL at 12:28

## 2018-11-02 RX ADMIN — Medication 400 MILLIGRAM(S): at 05:41

## 2018-11-02 RX ADMIN — Medication 100 MILLIGRAM(S): at 05:41

## 2018-11-02 RX ADMIN — Medication 100 MILLIGRAM(S): at 05:40

## 2018-11-02 RX ADMIN — Medication 81 MILLIGRAM(S): at 12:27

## 2018-11-02 NOTE — PROGRESS NOTE ADULT - PROBLEM SELECTOR PLAN 2
Changed IVF to 1/2NS.  Na level improving. Encourage free water intake.  Monitor Na. Discharge off IVF.

## 2018-11-02 NOTE — PROGRESS NOTE ADULT - ATTENDING COMMENTS
Patient medically optimized for discharge to group home.  Discharge planning 40 minutes - discussed with patient and consultants.  Roque Dupree MD  Pager# [phone]

## 2018-11-02 NOTE — PROGRESS NOTE ADULT - PROVIDER SPECIALTY LIST ADULT
Hospitalist
Neurology
Hospitalist
Neurology
Infectious Disease
Infectious Disease
Pulmonology
Infectious Disease
Hospitalist

## 2018-11-02 NOTE — PROGRESS NOTE ADULT - SUBJECTIVE AND OBJECTIVE BOX
CC: F/U for encephalopathy    SUBJECTIVE / OVERNIGHT EVENTS:  No new events overnight.  No F/C, N/V, CP, SOB, Cough, lightheadedness, dizziness, abdominal pain, diarrhea, dysuria.    MEDICATIONS  (STANDING):  artificial tears (preservative free) Ophthalmic Solution 1 Drop(s) Both EYES two times a day  aspirin  chewable 81 milliGRAM(s) Oral daily  benztropine 0.5 milliGRAM(s) Oral two times a day  buDESOnide   0.5 milliGRAM(s) Respule 0.5 milliGRAM(s) Inhalation daily  carBAMazepine XR Tablet 400 milliGRAM(s) Oral two times a day  carBAMazepine XR Tablet 100 milliGRAM(s) Oral daily  diVALproex  milliGRAM(s) Oral daily  docusate sodium 100 milliGRAM(s) Oral three times a day  enoxaparin Injectable 40 milliGRAM(s) SubCutaneous every 24 hours  ferrous    sulfate 325 milliGRAM(s) Oral daily  fluticasone propionate 50 MICROgram(s)/spray Nasal Spray 1 Spray(s) Both Nostrils two times a day  folic acid 1 milliGRAM(s) Oral daily  levETIRAcetam 1000 milliGRAM(s) Oral two times a day  OLANZapine 7.5 milliGRAM(s) Oral daily  pantoprazole    Tablet 40 milliGRAM(s) Oral before breakfast  phenytoin   Capsule 100 milliGRAM(s) Oral two times a day  sodium chloride 0.45%. 1000 milliLiter(s) (60 mL/Hr) IV Continuous <Continuous>    MEDICATIONS  (PRN):  AQUAPHOR (petrolatum Ointment) 1 Application(s) Topical two times a day PRN sacrum rash  clotrimazole 1% Cream 1 Application(s) Topical two times a day PRN rash  nystatin Powder 1 Application(s) Topical two times a day PRN erythema of sacrum      Vital Signs Last 24 Hrs  T(C): 36.3 (02 Nov 2018 12:20), Max: 36.9 (02 Nov 2018 05:36)  T(F): 97.3 (02 Nov 2018 12:20), Max: 98.4 (02 Nov 2018 05:36)  HR: 73 (02 Nov 2018 12:20) (73 - 90)  BP: 134/85 (02 Nov 2018 12:20) (127/86 - 144/84)  BP(mean): --  RR: 18 (02 Nov 2018 12:20) (18 - 20)  SpO2: 96% (02 Nov 2018 12:20) (96% - 97%)  CAPILLARY BLOOD GLUCOSE        I&O's Summary      PHYSICAL EXAM:  GENERAL: in bed, non-verbal, NAD,  HEENT: anicteric sclera  CHEST/LUNG: Clear to percussion bilaterally; No rales, rhonchi, wheezing, or rubs.   HEART: Regular rate and rhythm; No murmurs, rubs, or gallops  ABDOMEN: Soft, Nontender, Nondistended; Bowel sounds present.    EXTREMITIES:  contraction of b/l hands. +2 edema feet, no edema of shins.   SKIN: No rashes or lesions  PSYCH: not actively participating.  NEURO- Alert and aware, pupils reactive Unable to assess further as pt does not follow commands     LABS:    11-01    147<H>  |  105  |  9   ----------------------------<  106<H>  3.5   |  32<H>  |  0.54    Ca    8.8      01 Nov 2018 11:25                RADIOLOGY & ADDITIONAL TESTS:    Imaging Personally Reviewed:    Care Discussed with Consultants/Other Providers:

## 2018-11-02 NOTE — PROGRESS NOTE ADULT - PROBLEM SELECTOR PLAN 10
DVT: enoxaparin  Diet: Soft
DVT: enoxaparin
DVT: enoxaparin  Diet: Soft
DVT: enoxaparin
DVT: enoxaparin  Diet: Soft
DVT: enoxaparin
DVT: enoxaparin  Diet: Soft
DVT: enoxaparin  Diet: Soft

## 2018-11-02 NOTE — PROGRESS NOTE ADULT - REASON FOR ADMISSION
Obstipation

## 2018-11-03 LAB — BACTERIA BLD CULT: SIGNIFICANT CHANGE UP

## 2018-12-23 ENCOUNTER — EMERGENCY (EMERGENCY)
Facility: HOSPITAL | Age: 53
LOS: 1 days | Discharge: HOME | End: 2018-12-23
Attending: EMERGENCY MEDICINE | Admitting: EMERGENCY MEDICINE
Payer: MEDICARE

## 2018-12-23 VITALS
SYSTOLIC BLOOD PRESSURE: 145 MMHG | DIASTOLIC BLOOD PRESSURE: 92 MMHG | TEMPERATURE: 97 F | OXYGEN SATURATION: 96 % | RESPIRATION RATE: 16 BRPM | HEART RATE: 68 BPM

## 2018-12-23 PROCEDURE — 99283 EMERGENCY DEPT VISIT LOW MDM: CPT | Mod: GC

## 2018-12-23 RX ORDER — LEVETIRACETAM 250 MG/1
1000 TABLET, FILM COATED ORAL ONCE
Refills: 0 | Status: COMPLETED | OUTPATIENT
Start: 2018-12-23 | End: 2018-12-23

## 2018-12-23 RX ORDER — VALPROIC ACID (AS SODIUM SALT) 250 MG/5ML
1000 SOLUTION, ORAL ORAL ONCE
Refills: 0 | Status: COMPLETED | OUTPATIENT
Start: 2018-12-23 | End: 2018-12-23

## 2018-12-23 RX ORDER — VALPROIC ACID (AS SODIUM SALT) 250 MG/5ML
1000 SOLUTION, ORAL ORAL ONCE
Refills: 0 | Status: DISCONTINUED | OUTPATIENT
Start: 2018-12-23 | End: 2018-12-23

## 2018-12-23 RX ORDER — VALPROIC ACID (AS SODIUM SALT) 250 MG/5ML
500 SOLUTION, ORAL ORAL ONCE
Refills: 0 | Status: DISCONTINUED | OUTPATIENT
Start: 2018-12-23 | End: 2018-12-23

## 2018-12-23 RX ADMIN — Medication 100 MILLIGRAM(S): at 14:25

## 2018-12-23 RX ADMIN — Medication 1000 MILLIGRAM(S): at 14:25

## 2018-12-23 RX ADMIN — LEVETIRACETAM 1000 MILLIGRAM(S): 250 TABLET, FILM COATED ORAL at 14:25

## 2018-12-23 NOTE — ED PROVIDER NOTE - OBJECTIVE STATEMENT
53 year old female with CP (a/w severe ID, baseline non verbal, does not ambulate, however can feed herself, follows some commands), intractable seizures who presents after incorrect medication administration at group home. Patient was given Atenolol 12.5mg, Claritin, Lactulose, Pepcid, Trileptal, Synthroid 25mcg. Patient at her baseline.

## 2018-12-23 NOTE — ED ADULT NURSE NOTE - OBJECTIVE STATEMENT
pt brought to rm 18, non verbal, non ambulatory @ baseline, brought by staff for medication error, as per aid @ bedside/paperwork pt received ASA EC 81mg, Atenolol 25mg, Claritin 10mg, Colace 100mg, folic acid 1mg, Lactulose 10mg/15ml solu, multivit, pepcid 20 mg, synthroid 25mcg, trileptal 600mg, and Vit D3 1,000units PO this morning by error, aid @ bedside states pt @ baseline health/mental state, MD Hernandez @ bedside for eval, pt placed on CM for HR monitoring, no SL/labs @ this time as per arsh Mercedes remains @ bedside, awaiting further reassessment, will continue to monitor.  (break)

## 2018-12-23 NOTE — ED PROVIDER NOTE - NSFOLLOWUPINSTRUCTIONS_ED_ALL_ED_FT
Please continue taking your medication as prescribed. If you develop and chest pain, shortness of breath, abdominal pain, fever, chills, nausea/vomiting please seek medical assistance

## 2018-12-23 NOTE — ED ADULT NURSE NOTE - NSIMPLEMENTINTERV_GEN_ALL_ED
Implemented All Fall Risk Interventions:  [place] to call system. Call bell, personal items and telephone within reach. Instruct patient to call for assistance. Room bathroom lighting operational. Non-slip footwear when patient is off stretcher. Physically safe environment: no spills, clutter or unnecessary equipment. Stretcher in lowest position, wheels locked, appropriate side rails in place. Provide visual cue, wrist band, yellow gown, etc. Monitor gait and stability. Monitor for mental status changes and reorient to person, place, and time. Review medications for side effects contributing to fall risk. Reinforce activity limits and safety measures with patient and family.

## 2018-12-23 NOTE — ED PROVIDER NOTE - CHIEF COMPLAINT
The patient is a 53y Female complaining of The patient is a 53y Female complaining of incorrect medication administration

## 2018-12-23 NOTE — ED ADULT TRIAGE NOTE - CHIEF COMPLAINT QUOTE
Per group home staff pt was given wrong patient's medication at approx 0830 this morning including Atenolol 12.5mg, Claritin, Lactulose, Pepcid, Trileptal, Synthroid.  Pt acting at her baseline which is combative, unable to take VS in triage, pt swinging.  Pt with good skin color appears in NAD. Pt with PMH profound MR, WC bound, seizure disorder.

## 2018-12-23 NOTE — ED PROVIDER NOTE - ATTENDING CONTRIBUTION TO CARE
Attending note:   After face to face evaluation of this patient, I concur with above noted hx, pe, and care plan for this patient.  52 y/o F with dementia, sz disorder, accidentally got another patients meds at a group home.   Patient is still in need of three seizure meds, not given this am.   Aide spoken with regarding need for reporting this medication error.  Patient alert, responding at her normal level of non-verbal responsiveness.

## 2018-12-23 NOTE — ED PROVIDER NOTE - PHYSICAL EXAMINATION
Constitutional: NAD, awake and alert  EYES: EOMI  Neck: Soft and supple  Respiratory: Breath sounds are clear bilaterally, No wheezing, rales or rhonchi  Cardiovascular: S1 and S2, regular rate and rhythm, no Murmurs, gallops or rubs  Gastrointestinal: Bowel Sounds present, soft, nontender, nondistended, no guarding, no rebound  Extremities: No cyanosis or clubbing; warm to touch  Neurological: A/O x 0, moves all ext's spontaneously  Skin: No rashes, warm & dry

## 2018-12-23 NOTE — ED PROVIDER NOTE - MEDICAL DECISION MAKING DETAILS
53 year old female with CP (a/w severe ID, baseline non verbal, does not ambulate, however can feed herself, follows some commands), intractable seizures who presents after incorrect medication administration at group home. VSS, discussed with group home , case has been reported to the state and the PCA administrating medications has been pulled from medication duties. Will monitor patient in the ED for adverse rxn, give appropriate anti-seizure medications Keppra, Valproic, phenytoin. Discussed medication reconciliation with pharmacy.

## 2019-01-10 NOTE — ED ADULT TRIAGE NOTE - PAIN: PRESENCE, MLM
Here for INR check in coumadin clinic in the office. New to coumadin clinic. New patient of Dr. Saurabh Walsh. Office visit with him next. Has been taking coumadin for Afib since 2016.     States dose was increase after last INR 3 weeks ago from other provider non-verbal indicator of pain/discomfort not present

## 2019-02-15 ENCOUNTER — EMERGENCY (EMERGENCY)
Facility: HOSPITAL | Age: 54
LOS: 1 days | Discharge: HOME | End: 2019-02-15
Attending: EMERGENCY MEDICINE | Admitting: EMERGENCY MEDICINE
Payer: MEDICARE

## 2019-02-15 VITALS
OXYGEN SATURATION: 99 % | DIASTOLIC BLOOD PRESSURE: 60 MMHG | SYSTOLIC BLOOD PRESSURE: 94 MMHG | RESPIRATION RATE: 18 BRPM | HEART RATE: 85 BPM

## 2019-02-15 PROCEDURE — 99285 EMERGENCY DEPT VISIT HI MDM: CPT | Mod: GC

## 2019-02-15 NOTE — ED ADULT TRIAGE NOTE - CHIEF COMPLAINT QUOTE
Pt. w/ hx. cerebral palsy arrives w/ aide from [facility] for decrease PO intake for 2x weeks. As per aide pt. has no new symptoms except for lack of eating. Pt. non-verbal does not appear in any pain. Addendum : unable to obtain temp in triage.

## 2019-02-16 VITALS
HEART RATE: 59 BPM | OXYGEN SATURATION: 99 % | TEMPERATURE: 97 F | RESPIRATION RATE: 18 BRPM | DIASTOLIC BLOOD PRESSURE: 69 MMHG | SYSTOLIC BLOOD PRESSURE: 105 MMHG

## 2019-02-16 LAB
ALBUMIN SERPL ELPH-MCNC: 3.9 G/DL — SIGNIFICANT CHANGE UP (ref 3.3–5)
ALP SERPL-CCNC: 77 U/L — SIGNIFICANT CHANGE UP (ref 40–120)
ALT FLD-CCNC: 31 U/L — SIGNIFICANT CHANGE UP (ref 4–33)
ANION GAP SERPL CALC-SCNC: 11 MMO/L — SIGNIFICANT CHANGE UP (ref 7–14)
APPEARANCE UR: SIGNIFICANT CHANGE UP
AST SERPL-CCNC: 20 U/L — SIGNIFICANT CHANGE UP (ref 4–32)
BACTERIA # UR AUTO: SIGNIFICANT CHANGE UP
BASOPHILS # BLD AUTO: 0.03 K/UL — SIGNIFICANT CHANGE UP (ref 0–0.2)
BASOPHILS NFR BLD AUTO: 0.4 % — SIGNIFICANT CHANGE UP (ref 0–2)
BILIRUB SERPL-MCNC: 0.3 MG/DL — SIGNIFICANT CHANGE UP (ref 0.2–1.2)
BILIRUB UR-MCNC: NEGATIVE — SIGNIFICANT CHANGE UP
BLOOD UR QL VISUAL: NEGATIVE — SIGNIFICANT CHANGE UP
BUN SERPL-MCNC: 16 MG/DL — SIGNIFICANT CHANGE UP (ref 7–23)
CALCIUM SERPL-MCNC: 9.8 MG/DL — SIGNIFICANT CHANGE UP (ref 8.4–10.5)
CHLORIDE SERPL-SCNC: 102 MMOL/L — SIGNIFICANT CHANGE UP (ref 98–107)
CO2 SERPL-SCNC: 30 MMOL/L — SIGNIFICANT CHANGE UP (ref 22–31)
COLOR SPEC: YELLOW — SIGNIFICANT CHANGE UP
CREAT SERPL-MCNC: 0.67 MG/DL — SIGNIFICANT CHANGE UP (ref 0.5–1.3)
EOSINOPHIL # BLD AUTO: 0.15 K/UL — SIGNIFICANT CHANGE UP (ref 0–0.5)
EOSINOPHIL NFR BLD AUTO: 1.8 % — SIGNIFICANT CHANGE UP (ref 0–6)
GLUCOSE SERPL-MCNC: 95 MG/DL — SIGNIFICANT CHANGE UP (ref 70–99)
GLUCOSE UR-MCNC: NEGATIVE — SIGNIFICANT CHANGE UP
HCT VFR BLD CALC: 40.9 % — SIGNIFICANT CHANGE UP (ref 34.5–45)
HGB BLD-MCNC: 13.2 G/DL — SIGNIFICANT CHANGE UP (ref 11.5–15.5)
IMM GRANULOCYTES NFR BLD AUTO: 0.1 % — SIGNIFICANT CHANGE UP (ref 0–1.5)
KETONES UR-MCNC: SIGNIFICANT CHANGE UP
LEUKOCYTE ESTERASE UR-ACNC: SIGNIFICANT CHANGE UP
LIDOCAIN IGE QN: 25.2 U/L — SIGNIFICANT CHANGE UP (ref 7–60)
LYMPHOCYTES # BLD AUTO: 2.87 K/UL — SIGNIFICANT CHANGE UP (ref 1–3.3)
LYMPHOCYTES # BLD AUTO: 34.5 % — SIGNIFICANT CHANGE UP (ref 13–44)
MCHC RBC-ENTMCNC: 31.5 PG — SIGNIFICANT CHANGE UP (ref 27–34)
MCHC RBC-ENTMCNC: 32.3 % — SIGNIFICANT CHANGE UP (ref 32–36)
MCV RBC AUTO: 97.6 FL — SIGNIFICANT CHANGE UP (ref 80–100)
MONOCYTES # BLD AUTO: 0.73 K/UL — SIGNIFICANT CHANGE UP (ref 0–0.9)
MONOCYTES NFR BLD AUTO: 8.8 % — SIGNIFICANT CHANGE UP (ref 2–14)
NEUTROPHILS # BLD AUTO: 4.52 K/UL — SIGNIFICANT CHANGE UP (ref 1.8–7.4)
NEUTROPHILS NFR BLD AUTO: 54.4 % — SIGNIFICANT CHANGE UP (ref 43–77)
NITRITE UR-MCNC: POSITIVE — HIGH
NRBC # FLD: 0 K/UL — LOW (ref 25–125)
PH UR: 5.5 — SIGNIFICANT CHANGE UP (ref 5–8)
PLATELET # BLD AUTO: 232 K/UL — SIGNIFICANT CHANGE UP (ref 150–400)
PMV BLD: 10.5 FL — SIGNIFICANT CHANGE UP (ref 7–13)
POTASSIUM SERPL-MCNC: 3.7 MMOL/L — SIGNIFICANT CHANGE UP (ref 3.5–5.3)
POTASSIUM SERPL-SCNC: 3.7 MMOL/L — SIGNIFICANT CHANGE UP (ref 3.5–5.3)
PROT SERPL-MCNC: 7.2 G/DL — SIGNIFICANT CHANGE UP (ref 6–8.3)
PROT UR-MCNC: 70 — SIGNIFICANT CHANGE UP
RBC # BLD: 4.19 M/UL — SIGNIFICANT CHANGE UP (ref 3.8–5.2)
RBC # FLD: 12.4 % — SIGNIFICANT CHANGE UP (ref 10.3–14.5)
RBC CASTS # UR COMP ASSIST: SIGNIFICANT CHANGE UP (ref 0–?)
SODIUM SERPL-SCNC: 143 MMOL/L — SIGNIFICANT CHANGE UP (ref 135–145)
SP GR SPEC: 1.03 — SIGNIFICANT CHANGE UP (ref 1–1.04)
SQUAMOUS # UR AUTO: SIGNIFICANT CHANGE UP
UROBILINOGEN FLD QL: NORMAL — SIGNIFICANT CHANGE UP
WBC # BLD: 8.31 K/UL — SIGNIFICANT CHANGE UP (ref 3.8–10.5)
WBC # FLD AUTO: 8.31 K/UL — SIGNIFICANT CHANGE UP (ref 3.8–10.5)
WBC UR QL: >50 — HIGH (ref 0–?)

## 2019-02-16 PROCEDURE — 71045 X-RAY EXAM CHEST 1 VIEW: CPT | Mod: 26

## 2019-02-16 RX ORDER — SODIUM CHLORIDE 9 MG/ML
1000 INJECTION INTRAMUSCULAR; INTRAVENOUS; SUBCUTANEOUS ONCE
Refills: 0 | Status: COMPLETED | OUTPATIENT
Start: 2019-02-16 | End: 2019-02-16

## 2019-02-16 RX ORDER — CEPHALEXIN 500 MG
1 CAPSULE ORAL
Qty: 12 | Refills: 0
Start: 2019-02-16 | End: 2019-02-21

## 2019-02-16 RX ORDER — MIDAZOLAM HYDROCHLORIDE 1 MG/ML
5 INJECTION, SOLUTION INTRAMUSCULAR; INTRAVENOUS ONCE
Refills: 0 | Status: DISCONTINUED | OUTPATIENT
Start: 2019-02-16 | End: 2019-02-16

## 2019-02-16 RX ORDER — CEFTRIAXONE 500 MG/1
1 INJECTION, POWDER, FOR SOLUTION INTRAMUSCULAR; INTRAVENOUS ONCE
Refills: 0 | Status: COMPLETED | OUTPATIENT
Start: 2019-02-16 | End: 2019-02-16

## 2019-02-16 RX ADMIN — SODIUM CHLORIDE 1000 MILLILITER(S): 9 INJECTION INTRAMUSCULAR; INTRAVENOUS; SUBCUTANEOUS at 02:52

## 2019-02-16 RX ADMIN — CEFTRIAXONE 100 GRAM(S): 500 INJECTION, POWDER, FOR SOLUTION INTRAMUSCULAR; INTRAVENOUS at 04:05

## 2019-02-16 RX ADMIN — MIDAZOLAM HYDROCHLORIDE 5 MILLIGRAM(S): 1 INJECTION, SOLUTION INTRAMUSCULAR; INTRAVENOUS at 01:03

## 2019-02-16 NOTE — ED ADULT NURSE NOTE - CAS DISCH TRANSFER METHOD
No signs of fracture to your hand or knee. You may take Tylenol and use ice wraps for ongoing management of your pain. Please follow-up with your primary care provider in 1 week for re-evaluation. Please return to the ED for new, worsening, or concerning symptoms.    Our goal in the emergency department is to always give you outstanding care and exceptional service. You may receive a survey by mail or e-mail in the next week regarding your experience in our ED. We would greatly appreciate your completing and returning the survey. Your feedback provides us with a way to recognize our staff who give very good care and it helps us learn how to improve when your experience was below our aspiration of excellence.   
Ambulance

## 2019-02-16 NOTE — ED PROVIDER NOTE - ATTENDING CONTRIBUTION TO CARE
I performed a face to face bedside interview with patient regarding history of present illness, review of symptoms and past medical history. I completed an independent physical exam.  I have discussed patient's plan of care.   I agree with note as stated above, having amended the EMR as needed to reflect my findings. I have discussed the assessment and plan of care.  This includes during the time I functioned as the attending physician for this patient.  Attending Contribution to Care: agree with plan of resident. pt with hx of chest pain , nonverbal, baseline aggressive p/w decreased PO intake. states is at baseline agitated as per usual. but has not eaten in 2 weeks with decreased intake. moving all extremities spontaneously. limited hx due pt's mental status. discussed case with group home management, who agrees with plan. first dose abx started here in Ed. pt able to tolerate PO meds. stable for d/c . vss. hd stable.

## 2019-02-16 NOTE — ED PROVIDER NOTE - CLINICAL SUMMARY MEDICAL DECISION MAKING FREE TEXT BOX
53F with decreased PO intake, CP and non-verbal at baseline. r/o infection, check labs with urine, urine culture. IVF hydration

## 2019-02-16 NOTE — ED PROVIDER NOTE - CONSTITUTIONAL, MLM
normal... Chronically ill appearing, well nourished, and, time/situation and in no apparent distress.

## 2019-02-16 NOTE — ED PROVIDER NOTE - OBJECTIVE STATEMENT
53F with hx of CP from group home for decreased PO intake today. per aide, patient did not eat or drink anything today. non-verbal at baseline, but did not seem uncomfortable. no N/V/D, fevers.

## 2019-02-16 NOTE — ED ADULT NURSE NOTE - OBJECTIVE STATEMENT
pt received nonverbal and wheelchair bound from a group home , aide at the bedside giving history. pmhx cerebral palsy, seizures.as per aide pt has been refusing to eat and take medications for about 2 weeks. respirations equal and unlabored. abd soft but appears slightly distended . abd is nontender. pt skin intact. Call bell in reach, warm blanket provided, bed in lowest position, side rails up x2,safety maintained. will continue to monitor. pt waiting for md mason.

## 2019-02-17 LAB — SPECIMEN SOURCE: SIGNIFICANT CHANGE UP

## 2019-02-18 LAB
-  AMIKACIN: SIGNIFICANT CHANGE UP
-  AMPICILLIN/SULBACTAM: SIGNIFICANT CHANGE UP
-  AMPICILLIN: SIGNIFICANT CHANGE UP
-  AZTREONAM: SIGNIFICANT CHANGE UP
-  CEFAZOLIN: SIGNIFICANT CHANGE UP
-  CEFEPIME: SIGNIFICANT CHANGE UP
-  CEFOXITIN: SIGNIFICANT CHANGE UP
-  CEFTAZIDIME: SIGNIFICANT CHANGE UP
-  CEFTRIAXONE: SIGNIFICANT CHANGE UP
-  CIPROFLOXACIN: SIGNIFICANT CHANGE UP
-  ERTAPENEM: SIGNIFICANT CHANGE UP
-  GENTAMICIN: SIGNIFICANT CHANGE UP
-  IMIPENEM: SIGNIFICANT CHANGE UP
-  LEVOFLOXACIN: SIGNIFICANT CHANGE UP
-  MEROPENEM: SIGNIFICANT CHANGE UP
-  NITROFURANTOIN: SIGNIFICANT CHANGE UP
-  PIPERACILLIN/TAZOBACTAM: SIGNIFICANT CHANGE UP
-  TIGECYCLINE: SIGNIFICANT CHANGE UP
-  TOBRAMYCIN: SIGNIFICANT CHANGE UP
-  TRIMETHOPRIM/SULFAMETHOXAZOLE: SIGNIFICANT CHANGE UP
BACTERIA UR CULT: SIGNIFICANT CHANGE UP
METHOD TYPE: SIGNIFICANT CHANGE UP
ORGANISM # SPEC MICROSCOPIC CNT: SIGNIFICANT CHANGE UP
ORGANISM # SPEC MICROSCOPIC CNT: SIGNIFICANT CHANGE UP

## 2019-02-26 ENCOUNTER — INPATIENT (INPATIENT)
Facility: HOSPITAL | Age: 54
LOS: 16 days | Discharge: NURSING, REHAB OR LONG-TERM CARE | End: 2019-03-15
Attending: HOSPITALIST | Admitting: HOSPITALIST
Payer: MEDICARE

## 2019-02-26 VITALS — DIASTOLIC BLOOD PRESSURE: 84 MMHG | TEMPERATURE: 99 F | SYSTOLIC BLOOD PRESSURE: 153 MMHG

## 2019-02-26 LAB
ALBUMIN SERPL ELPH-MCNC: 3.8 G/DL — SIGNIFICANT CHANGE UP (ref 3.3–5)
ALP SERPL-CCNC: 132 U/L — HIGH (ref 40–120)
ALT FLD-CCNC: 69 U/L — HIGH (ref 4–33)
ANION GAP SERPL CALC-SCNC: 14 MMO/L — SIGNIFICANT CHANGE UP (ref 7–14)
APPEARANCE UR: CLEAR — SIGNIFICANT CHANGE UP
AST SERPL-CCNC: 58 U/L — HIGH (ref 4–32)
BASE EXCESS BLDV CALC-SCNC: 7.3 MMOL/L — SIGNIFICANT CHANGE UP
BASOPHILS # BLD AUTO: 0.03 K/UL — SIGNIFICANT CHANGE UP (ref 0–0.2)
BASOPHILS NFR BLD AUTO: 0.4 % — SIGNIFICANT CHANGE UP (ref 0–2)
BILIRUB SERPL-MCNC: 0.3 MG/DL — SIGNIFICANT CHANGE UP (ref 0.2–1.2)
BILIRUB UR-MCNC: NEGATIVE — SIGNIFICANT CHANGE UP
BLOOD GAS VENOUS - CREATININE: 0.47 MG/DL — LOW (ref 0.5–1.3)
BLOOD UR QL VISUAL: SIGNIFICANT CHANGE UP
BUN SERPL-MCNC: 15 MG/DL — SIGNIFICANT CHANGE UP (ref 7–23)
CALCIUM SERPL-MCNC: 9.8 MG/DL — SIGNIFICANT CHANGE UP (ref 8.4–10.5)
CHLORIDE BLDV-SCNC: 106 MMOL/L — SIGNIFICANT CHANGE UP (ref 96–108)
CHLORIDE SERPL-SCNC: 102 MMOL/L — SIGNIFICANT CHANGE UP (ref 98–107)
CO2 SERPL-SCNC: 29 MMOL/L — SIGNIFICANT CHANGE UP (ref 22–31)
COLOR SPEC: YELLOW — SIGNIFICANT CHANGE UP
CREAT SERPL-MCNC: 0.62 MG/DL — SIGNIFICANT CHANGE UP (ref 0.5–1.3)
EOSINOPHIL # BLD AUTO: 0.26 K/UL — SIGNIFICANT CHANGE UP (ref 0–0.5)
EOSINOPHIL NFR BLD AUTO: 3.1 % — SIGNIFICANT CHANGE UP (ref 0–6)
GAS PNL BLDV: 144 MMOL/L — SIGNIFICANT CHANGE UP (ref 136–146)
GLUCOSE BLDV-MCNC: 88 — SIGNIFICANT CHANGE UP (ref 70–99)
GLUCOSE SERPL-MCNC: 87 MG/DL — SIGNIFICANT CHANGE UP (ref 70–99)
GLUCOSE UR-MCNC: NEGATIVE — SIGNIFICANT CHANGE UP
HCO3 BLDV-SCNC: 29 MMOL/L — HIGH (ref 20–27)
HCT VFR BLD CALC: 45.4 % — HIGH (ref 34.5–45)
HCT VFR BLDV CALC: 44.3 % — SIGNIFICANT CHANGE UP (ref 34.5–45)
HGB BLD-MCNC: 14.2 G/DL — SIGNIFICANT CHANGE UP (ref 11.5–15.5)
HGB BLDV-MCNC: 14.5 G/DL — SIGNIFICANT CHANGE UP (ref 11.5–15.5)
IMM GRANULOCYTES NFR BLD AUTO: 0.2 % — SIGNIFICANT CHANGE UP (ref 0–1.5)
KETONES UR-MCNC: SIGNIFICANT CHANGE UP
LACTATE BLDV-MCNC: 2 MMOL/L — SIGNIFICANT CHANGE UP (ref 0.5–2)
LEUKOCYTE ESTERASE UR-ACNC: HIGH
LYMPHOCYTES # BLD AUTO: 3.16 K/UL — SIGNIFICANT CHANGE UP (ref 1–3.3)
LYMPHOCYTES # BLD AUTO: 37.1 % — SIGNIFICANT CHANGE UP (ref 13–44)
MAGNESIUM SERPL-MCNC: 1.7 MG/DL — SIGNIFICANT CHANGE UP (ref 1.6–2.6)
MCHC RBC-ENTMCNC: 31.3 % — LOW (ref 32–36)
MCHC RBC-ENTMCNC: 31.3 PG — SIGNIFICANT CHANGE UP (ref 27–34)
MCV RBC AUTO: 100.2 FL — HIGH (ref 80–100)
MONOCYTES # BLD AUTO: 0.66 K/UL — SIGNIFICANT CHANGE UP (ref 0–0.9)
MONOCYTES NFR BLD AUTO: 7.7 % — SIGNIFICANT CHANGE UP (ref 2–14)
NEUTROPHILS # BLD AUTO: 4.39 K/UL — SIGNIFICANT CHANGE UP (ref 1.8–7.4)
NEUTROPHILS NFR BLD AUTO: 51.5 % — SIGNIFICANT CHANGE UP (ref 43–77)
NITRITE UR-MCNC: NEGATIVE — SIGNIFICANT CHANGE UP
NRBC # FLD: 0 K/UL — LOW (ref 25–125)
PCO2 BLDV: 54 MMHG — HIGH (ref 41–51)
PH BLDV: 7.39 PH — SIGNIFICANT CHANGE UP (ref 7.32–7.43)
PH UR: 6.5 — SIGNIFICANT CHANGE UP (ref 5–8)
PHENYTOIN FREE SERPL-MCNC: 2 UG/ML — LOW (ref 10–20)
PHOSPHATE SERPL-MCNC: 3.6 MG/DL — SIGNIFICANT CHANGE UP (ref 2.5–4.5)
PLATELET # BLD AUTO: 290 K/UL — SIGNIFICANT CHANGE UP (ref 150–400)
PMV BLD: 10.3 FL — SIGNIFICANT CHANGE UP (ref 7–13)
PO2 BLDV: 26 MMHG — LOW (ref 35–40)
POTASSIUM BLDV-SCNC: 3.8 MMOL/L — SIGNIFICANT CHANGE UP (ref 3.4–4.5)
POTASSIUM SERPL-MCNC: 3.8 MMOL/L — SIGNIFICANT CHANGE UP (ref 3.5–5.3)
POTASSIUM SERPL-SCNC: 3.8 MMOL/L — SIGNIFICANT CHANGE UP (ref 3.5–5.3)
PROT SERPL-MCNC: 8 G/DL — SIGNIFICANT CHANGE UP (ref 6–8.3)
PROT UR-MCNC: SIGNIFICANT CHANGE UP
RBC # BLD: 4.53 M/UL — SIGNIFICANT CHANGE UP (ref 3.8–5.2)
RBC # FLD: 12.1 % — SIGNIFICANT CHANGE UP (ref 10.3–14.5)
SAO2 % BLDV: 41.7 % — LOW (ref 60–85)
SODIUM SERPL-SCNC: 145 MMOL/L — SIGNIFICANT CHANGE UP (ref 135–145)
SP GR SPEC: 1.03 — SIGNIFICANT CHANGE UP (ref 1–1.04)
TSH SERPL-MCNC: 2.15 UIU/ML — SIGNIFICANT CHANGE UP (ref 0.27–4.2)
UROBILINOGEN FLD QL: NORMAL — SIGNIFICANT CHANGE UP
WBC # BLD: 8.52 K/UL — SIGNIFICANT CHANGE UP (ref 3.8–10.5)
WBC # FLD AUTO: 8.52 K/UL — SIGNIFICANT CHANGE UP (ref 3.8–10.5)

## 2019-02-26 PROCEDURE — 71045 X-RAY EXAM CHEST 1 VIEW: CPT | Mod: 26

## 2019-02-26 RX ORDER — HALOPERIDOL DECANOATE 100 MG/ML
2.5 INJECTION INTRAMUSCULAR ONCE
Refills: 0 | Status: COMPLETED | OUTPATIENT
Start: 2019-02-26 | End: 2019-02-26

## 2019-02-26 RX ORDER — SODIUM CHLORIDE 9 MG/ML
1000 INJECTION INTRAMUSCULAR; INTRAVENOUS; SUBCUTANEOUS ONCE
Refills: 0 | Status: COMPLETED | OUTPATIENT
Start: 2019-02-26 | End: 2019-02-26

## 2019-02-26 RX ORDER — SODIUM CHLORIDE 9 MG/ML
1000 INJECTION INTRAMUSCULAR; INTRAVENOUS; SUBCUTANEOUS ONCE
Refills: 0 | Status: DISCONTINUED | OUTPATIENT
Start: 2019-02-26 | End: 2019-02-26

## 2019-02-26 RX ADMIN — HALOPERIDOL DECANOATE 2.5 MILLIGRAM(S): 100 INJECTION INTRAMUSCULAR at 20:12

## 2019-02-26 RX ADMIN — Medication 1 MILLIGRAM(S): at 23:00

## 2019-02-26 RX ADMIN — Medication 1 MILLIGRAM(S): at 22:31

## 2019-02-26 RX ADMIN — SODIUM CHLORIDE 1000 MILLILITER(S): 9 INJECTION INTRAMUSCULAR; INTRAVENOUS; SUBCUTANEOUS at 20:09

## 2019-02-26 RX ADMIN — HALOPERIDOL DECANOATE 2.5 MILLIGRAM(S): 100 INJECTION INTRAMUSCULAR at 19:04

## 2019-02-26 RX ADMIN — SODIUM CHLORIDE 2000 MILLILITER(S): 9 INJECTION INTRAMUSCULAR; INTRAVENOUS; SUBCUTANEOUS at 23:00

## 2019-02-26 NOTE — ED PROVIDER NOTE - CLINICAL SUMMARY MEDICAL DECISION MAKING FREE TEXT BOX
52 yo woman PMH cerebral palsy, intellectual disability, and seizure sent from [facility] with altered mental status per neurologist in setting of decreased PO intake and medication noncompliance x 1-2 weeks. Will obtain labs, trop, and ecg, rsv, ua and urine culture, ct head, cxr to evaluate for ams.

## 2019-02-26 NOTE — ED PROVIDER NOTE - OBJECTIVE STATEMENT
52 yo woman PMH cerebral palsy and seizure sent from [facility] after seeing neurologist today who reported that patient was different than baseline. Pt has been refusing to take medications for the past 1-2 weeks. Per pt's aid, pt has not been eating normally x 3 weeks and had 1 episode of emesis last week, nonbloody. Pt also has not had BM since last week. Pt is nonverbal at baseline and normally uncooperative but has been more aggressive recently. Aid is unsure if she had taken any antibiotics from previous visit for the UTI.

## 2019-02-26 NOTE — ED ADULT NURSE REASSESSMENT NOTE - NS ED NURSE REASSESS COMMENT FT1
Report received from CARON Israel, pt accompanied with caretaker at bedside, pt uncooperative; unable to obtain VS, pt appears in NAD.

## 2019-02-26 NOTE — ED PROVIDER NOTE - PHYSICAL EXAMINATION
General: agitated patient and flailing but in no acute respiratory distress  Head: normocephalic, atraumatic  Eyes: PERRL   Mouth: dry crusting around lips  Neck: spontaneously moving neck  Abdomen: soft, nondistended  Neuro: awake and alert but nonverbal, moving all extremities spontaneously  Skin: no rash  Extremities: no edema    Chest and back exam limited due to pt agitation, will reassess after mild sedation.

## 2019-02-26 NOTE — ED PROVIDER NOTE - ATTENDING CONTRIBUTION TO CARE
Charlie is a 54 yo F with history of cerebral palsy, intellectual disability and seizures here for AMS. Patient lives at [facility]. Today, patient saw her neurologist who sent her in for evaluation. Patient was seen 10 days ago, found to have a UTI. Per aide, patient has been refusing medications, decreased PO intake. No BM in the past 1 week. Patient is non-verbal at baseline.     VS noted  Gen. agitated,   HEENT: EOMI, dry MM  Lungs: CTAB/L no C/ W /R   CVS: RRR   Abd; Soft non tender, non distended   Ext: no edema  Skin: no rash  Neuro moving all extremities  a/p: AMS - check labs, u/a, ekg, CXR, CT Head  - Donnie PAYAN Charlie is a 54 yo F with history of cerebral palsy, intellectual disability and seizures here for AMS. Patient lives at [facility]. Today, patient saw her neurologist who sent her in for evaluation. Patient was seen 10 days ago, found to have a UTI. Per aide, patient has been refusing medications, decreased PO intake. No BM in the past 1 week. Patient is non-verbal at baseline.     VS noted  Gen. agitated, awake  HEENT: EOMI, dry MM  Lungs: CTAB/L no C/ W /R   CVS: RRR   Abd; Soft non tender, non distended   Ext: no edema  Skin: no rash  Neuro moving all extremities  a/p: AMS - check labs, u/a, ekg, CXR, CT Head. Patient likely has UTI. Patient is agitated, throwing punches during evaluation. She will need to be calmed/ sedated for blood work, CT Head. Will try haldol.   - Donnie PAYAN Patient is a 54 yo F with history of cerebral palsy, intellectual disability and seizures here for AMS. Patient lives at [facility]. Today, patient saw her neurologist who sent her in for evaluation. Patient was seen 10 days ago, found to have a UTI. Per aide, patient has been refusing medications, decreased PO intake. No BM in the past 1 week. Patient is non-verbal at baseline.     VS noted  Gen. agitated, awake  HEENT: EOMI, dry MM  Lungs: CTAB/L no C/ W /R   CVS: RRR   Abd; Soft non tender, non distended   Ext: no edema  Skin: no rash  Neuro moving all extremities  a/p: AMS - check labs, u/a, ekg, CXR, CT Head. Patient likely has UTI. Patient is agitated, throwing punches during evaluation. She will need to be calmed/ sedated for blood work, CT Head. Will try haldol.   - Donnie PAYAN

## 2019-02-26 NOTE — ED ADULT NURSE NOTE - OBJECTIVE STATEMENT
pt received to room #10, with aide from group home for increased agitation and pale appearance. pt noted to have cough and be pale in appearance. pt alert and active, attempting to scratch, bite, kick and punch staff. IV placed, labs drawn and sent. pending attending eval, will cont to monitor.

## 2019-02-26 NOTE — ED ADULT TRIAGE NOTE - CHIEF COMPLAINT QUOTE
pt brought in from group home for increasing weakness, vomiting, aggression, decreased PO intake, and refusing to take medication x1 week- PMH MR, cerebral palsy, spastic encephalopathy, seizures- pt non-verbal, unable to take vitals, EKG or finger stick at this time, pt combative to touch- as per aid pt appears more pale in color pt brought in from group home for increasing weakness, vomiting, aggression, decreased PO intake, and refusing to take medication x1 week- PMH MR, cerebral palsy, spastic encephalopathy, seizures- pt non-verbal, unable to take vitals, EKG or finger stick at this time, pt combative to touch- as per aid pt appears more pale in color, pt awake, sitting in wheelchair

## 2019-02-26 NOTE — ED ADULT NURSE NOTE - CHIEF COMPLAINT QUOTE
pt brought in from group home for increasing weakness, vomiting, aggression, decreased PO intake, and refusing to take medication x1 week- PMH MR, cerebral palsy, spastic encephalopathy, seizures- pt non-verbal, unable to take vitals, EKG or finger stick at this time, pt combative to touch- as per aid pt appears more pale in color, pt awake, sitting in wheelchair

## 2019-02-27 DIAGNOSIS — Z29.9 ENCOUNTER FOR PROPHYLACTIC MEASURES, UNSPECIFIED: ICD-10-CM

## 2019-02-27 DIAGNOSIS — G93.40 ENCEPHALOPATHY, UNSPECIFIED: ICD-10-CM

## 2019-02-27 DIAGNOSIS — K59.00 CONSTIPATION, UNSPECIFIED: ICD-10-CM

## 2019-02-27 DIAGNOSIS — G40.909 EPILEPSY, UNSPECIFIED, NOT INTRACTABLE, WITHOUT STATUS EPILEPTICUS: ICD-10-CM

## 2019-02-27 DIAGNOSIS — N39.0 URINARY TRACT INFECTION, SITE NOT SPECIFIED: ICD-10-CM

## 2019-02-27 LAB
B PERT DNA SPEC QL NAA+PROBE: NOT DETECTED — SIGNIFICANT CHANGE UP
BACTERIA # UR AUTO: HIGH
C PNEUM DNA SPEC QL NAA+PROBE: NOT DETECTED — SIGNIFICANT CHANGE UP
FLUAV H1 2009 PAND RNA SPEC QL NAA+PROBE: NOT DETECTED — SIGNIFICANT CHANGE UP
FLUAV H1 RNA SPEC QL NAA+PROBE: NOT DETECTED — SIGNIFICANT CHANGE UP
FLUAV H3 RNA SPEC QL NAA+PROBE: NOT DETECTED — SIGNIFICANT CHANGE UP
FLUAV SUBTYP SPEC NAA+PROBE: NOT DETECTED — SIGNIFICANT CHANGE UP
FLUBV RNA SPEC QL NAA+PROBE: NOT DETECTED — SIGNIFICANT CHANGE UP
HADV DNA SPEC QL NAA+PROBE: NOT DETECTED — SIGNIFICANT CHANGE UP
HCOV PNL SPEC NAA+PROBE: SIGNIFICANT CHANGE UP
HMPV RNA SPEC QL NAA+PROBE: NOT DETECTED — SIGNIFICANT CHANGE UP
HPIV1 RNA SPEC QL NAA+PROBE: NOT DETECTED — SIGNIFICANT CHANGE UP
HPIV2 RNA SPEC QL NAA+PROBE: NOT DETECTED — SIGNIFICANT CHANGE UP
HPIV3 RNA SPEC QL NAA+PROBE: NOT DETECTED — SIGNIFICANT CHANGE UP
HPIV4 RNA SPEC QL NAA+PROBE: NOT DETECTED — SIGNIFICANT CHANGE UP
RBC CASTS # UR COMP ASSIST: SIGNIFICANT CHANGE UP (ref 0–?)
RSV RNA SPEC QL NAA+PROBE: NOT DETECTED — SIGNIFICANT CHANGE UP
RV+EV RNA SPEC QL NAA+PROBE: NOT DETECTED — SIGNIFICANT CHANGE UP
SQUAMOUS # UR AUTO: SIGNIFICANT CHANGE UP
WBC UR QL: SIGNIFICANT CHANGE UP (ref 0–?)

## 2019-02-27 PROCEDURE — 70450 CT HEAD/BRAIN W/O DYE: CPT | Mod: 26

## 2019-02-27 PROCEDURE — 99223 1ST HOSP IP/OBS HIGH 75: CPT | Mod: GC

## 2019-02-27 RX ORDER — BENZTROPINE MESYLATE 1 MG
0.5 TABLET ORAL
Refills: 0 | Status: DISCONTINUED | OUTPATIENT
Start: 2019-02-27 | End: 2019-03-01

## 2019-02-27 RX ORDER — BUDESONIDE, MICRONIZED 100 %
0.5 POWDER (GRAM) MISCELLANEOUS DAILY
Refills: 0 | Status: DISCONTINUED | OUTPATIENT
Start: 2019-02-27 | End: 2019-03-15

## 2019-02-27 RX ORDER — DOCUSATE SODIUM 100 MG
100 CAPSULE ORAL THREE TIMES A DAY
Refills: 0 | Status: DISCONTINUED | OUTPATIENT
Start: 2019-02-27 | End: 2019-03-15

## 2019-02-27 RX ORDER — PANTOPRAZOLE SODIUM 20 MG/1
40 TABLET, DELAYED RELEASE ORAL
Refills: 0 | Status: DISCONTINUED | OUTPATIENT
Start: 2019-02-27 | End: 2019-03-15

## 2019-02-27 RX ORDER — CEFTRIAXONE 500 MG/1
1 INJECTION, POWDER, FOR SOLUTION INTRAMUSCULAR; INTRAVENOUS EVERY 24 HOURS
Refills: 0 | Status: DISCONTINUED | OUTPATIENT
Start: 2019-02-27 | End: 2019-02-28

## 2019-02-27 RX ORDER — VANCOMYCIN HCL 1 G
1000 VIAL (EA) INTRAVENOUS ONCE
Refills: 0 | Status: COMPLETED | OUTPATIENT
Start: 2019-02-27 | End: 2019-02-27

## 2019-02-27 RX ORDER — OLANZAPINE 15 MG/1
2.5 TABLET, FILM COATED ORAL ONCE
Refills: 0 | Status: DISCONTINUED | OUTPATIENT
Start: 2019-02-27 | End: 2019-03-01

## 2019-02-27 RX ORDER — CEFEPIME 1 G/1
1000 INJECTION, POWDER, FOR SOLUTION INTRAMUSCULAR; INTRAVENOUS ONCE
Refills: 0 | Status: COMPLETED | OUTPATIENT
Start: 2019-02-27 | End: 2019-02-27

## 2019-02-27 RX ORDER — LEVETIRACETAM 250 MG/1
1000 TABLET, FILM COATED ORAL
Refills: 0 | Status: DISCONTINUED | OUTPATIENT
Start: 2019-02-27 | End: 2019-02-27

## 2019-02-27 RX ORDER — MIDAZOLAM HYDROCHLORIDE 1 MG/ML
2 INJECTION, SOLUTION INTRAMUSCULAR; INTRAVENOUS ONCE
Refills: 0 | Status: DISCONTINUED | OUTPATIENT
Start: 2019-02-27 | End: 2019-02-27

## 2019-02-27 RX ORDER — FERROUS SULFATE 325(65) MG
325 TABLET ORAL DAILY
Refills: 0 | Status: DISCONTINUED | OUTPATIENT
Start: 2019-02-27 | End: 2019-03-13

## 2019-02-27 RX ORDER — FOLIC ACID 0.8 MG
1 TABLET ORAL DAILY
Refills: 0 | Status: DISCONTINUED | OUTPATIENT
Start: 2019-02-27 | End: 2019-03-13

## 2019-02-27 RX ORDER — OLANZAPINE 15 MG/1
7.5 TABLET, FILM COATED ORAL DAILY
Refills: 0 | Status: DISCONTINUED | OUTPATIENT
Start: 2019-02-27 | End: 2019-03-15

## 2019-02-27 RX ORDER — ENOXAPARIN SODIUM 100 MG/ML
40 INJECTION SUBCUTANEOUS EVERY 24 HOURS
Refills: 0 | Status: DISCONTINUED | OUTPATIENT
Start: 2019-02-27 | End: 2019-03-15

## 2019-02-27 RX ORDER — FLUTICASONE PROPIONATE 50 MCG
1 SPRAY, SUSPENSION NASAL
Refills: 0 | Status: DISCONTINUED | OUTPATIENT
Start: 2019-02-27 | End: 2019-03-15

## 2019-02-27 RX ORDER — LEVETIRACETAM 250 MG/1
1000 TABLET, FILM COATED ORAL EVERY 12 HOURS
Refills: 0 | Status: DISCONTINUED | OUTPATIENT
Start: 2019-02-27 | End: 2019-03-11

## 2019-02-27 RX ORDER — ASPIRIN/CALCIUM CARB/MAGNESIUM 324 MG
81 TABLET ORAL DAILY
Refills: 0 | Status: DISCONTINUED | OUTPATIENT
Start: 2019-02-27 | End: 2019-03-15

## 2019-02-27 RX ADMIN — Medication 81 MILLIGRAM(S): at 12:49

## 2019-02-27 RX ADMIN — MIDAZOLAM HYDROCHLORIDE 2 MILLIGRAM(S): 1 INJECTION, SOLUTION INTRAMUSCULAR; INTRAVENOUS at 02:23

## 2019-02-27 RX ADMIN — Medication 100 MILLIGRAM(S): at 12:50

## 2019-02-27 RX ADMIN — CEFTRIAXONE 100 GRAM(S): 500 INJECTION, POWDER, FOR SOLUTION INTRAMUSCULAR; INTRAVENOUS at 12:49

## 2019-02-27 RX ADMIN — OLANZAPINE 7.5 MILLIGRAM(S): 15 TABLET, FILM COATED ORAL at 13:03

## 2019-02-27 RX ADMIN — Medication 250 MILLIGRAM(S): at 03:59

## 2019-02-27 RX ADMIN — LEVETIRACETAM 400 MILLIGRAM(S): 250 TABLET, FILM COATED ORAL at 18:58

## 2019-02-27 RX ADMIN — Medication 1 MILLIGRAM(S): at 12:49

## 2019-02-27 RX ADMIN — Medication 100 MILLIGRAM(S): at 22:40

## 2019-02-27 RX ADMIN — Medication 1 TABLET(S): at 12:50

## 2019-02-27 RX ADMIN — SODIUM CHLORIDE 1000 MILLILITER(S): 9 INJECTION INTRAMUSCULAR; INTRAVENOUS; SUBCUTANEOUS at 01:13

## 2019-02-27 RX ADMIN — ENOXAPARIN SODIUM 40 MILLIGRAM(S): 100 INJECTION SUBCUTANEOUS at 12:58

## 2019-02-27 RX ADMIN — CEFEPIME 100 MILLIGRAM(S): 1 INJECTION, POWDER, FOR SOLUTION INTRAMUSCULAR; INTRAVENOUS at 02:46

## 2019-02-27 RX ADMIN — Medication 325 MILLIGRAM(S): at 12:49

## 2019-02-27 NOTE — H&P ADULT - PROBLEM SELECTOR PLAN 2
- continue w/ home keppra, phenytoin - continue w/ home keppra, phenytoin level low, increased to 100 mg bid for QD

## 2019-02-27 NOTE — H&P ADULT - ATTENDING COMMENTS
Patient was seen and examined personally by me on 2/27/19. I have discussed the plan and reviewed the Resident's note and agree with the above physical exam findings including assessment and plan except as indicated below. Labs and imagining reviewed.     53F with CP, non-verbal, seizure presented with AMS. described as increased agitation and aggression. No acute metabolic derrangement found on presentation. Patient is awake alert and interactive. physical exam was unremarkable. UA+, s/p treatment for UTI on prior ED visit, currently without symptoms of UTI or pyelonephritis. monitor off abx. Patient was seen and examined personally by me on 2/27/19. I have discussed the plan and reviewed the Resident's note and agree with the above physical exam findings including assessment and plan except as indicated below. Labs and imagining reviewed.     53F with CP, non-verbal, seizure presented with AMS. described as increased agitation and aggression. No acute metabolic derrangement found on presentation. Patient is awake alert and interactive. physical exam was unremarkable. UA+, s/p treatment for UTI on prior ED visit, currently without symptoms of UTI or pyelonephritis. monitor off abx. Persistent abnormal CXR, will get CT lung for further evaluation.

## 2019-02-27 NOTE — H&P ADULT - ASSESSMENT
52 yo woman w/ PMH of CP(non verbal, does not ambulate), intractable seizures presenting from [facility] w/ encephalopathy

## 2019-02-27 NOTE — PROGRESS NOTE ADULT - PROBLEM SELECTOR PROBLEM 1
[No Acute Distress] : in no acute distress [Well developed] : well developed [Well Nourished] : ~L well nourished [Good Hygeine] : demonstrates good hygeine [Oriented x3] : oriented to person, place, and time [Normal Memory] : ~T memory was ~L unimpaired [Normal Mood/Affect] : mood and affect are normal [Warm and Dry] : was warm and dry to touch [Normal Gait] : gait was normal [No Invol. Movements] : no involuntary movements were seen [Vulvar Atrophy] : vulvar atrophy [Labia Majora] : were normal [Labia Minora] : were normal [Atrophy] : atrophy [Cystocele] : a cystocele Obstipation [Discharge] : a  ~M vaginal discharge was present [Scant] : scant [Mina] : yellow [Thin] : thin [Mucoid] : mucoid [No Bleeding] : there was no active vaginal bleeding [Anxiety] : patient is not anxious [Tenderness] : ~T no ~M abdominal tenderness observed [Distended] : not distended [Foul Smelling] : not foul smelling Encephalopathy

## 2019-02-27 NOTE — H&P ADULT - PROBLEM SELECTOR PLAN 3
- pt has not had BM for several days  - c/w senna, colace, miralax  - if continues to not have BM use magnesium citrate

## 2019-02-27 NOTE — H&P ADULT - HISTORY OF PRESENT ILLNESS
52 yo woman w/ PMH of CP(non verbal, does not ambulate), intractable seizures    In the ED:  VS: T 99.1, HR 82, /84, RR 17/97  Labs: WBC 8.52, Hgb 14.2, Platelet 290, Na+ 145, K+ 3.8, Cl 29, Bicarb 14, BUN 15, Cr 0.62, Alk phos 132, AST/ALT 58/69, Lactate 2.0, UA large LEC, neg nitrite  Imaging: CTH: No intracranial hemorrhage, mass effect or midline shift. CXR: Left pleural effusion, left retrocardiac opacity 54 yo woman w/ PMH of CP(non verbal, does not ambulate), intractable seizures presenting from [facility] w/ AMS. Pt is non-verbal at baseline. Per discussion with group home staff pt was at neurologist office yesterday, reported that patient was different than baseline and needed to go to [facility] ED for evaluation. Per staff pt has had behavioral changes over the past week, increasingly aggressive, scratching individuals. No fever, chills, SOB, CP. Non productive cough for the past week. One episode of emesis last week. Pt does not ambulate at baseline, quadraplegic. Pt previously visited [facility] ED 2/16 where she was d/c on cephalexin 500 mg bid for UTI.     In the ED:  VS: T 99.1, HR 82, /84, RR 17/97  Labs: WBC 8.52, Hgb 14.2, Platelet 290, Na+ 145, K+ 3.8, Cl 29, Bicarb 14, BUN 15, Cr 0.62, Alk phos 132, AST/ALT 58/69, Lactate 2.0, UA large LEC, neg nitrite  Imaging: CTH: No intracranial hemorrhage, mass effect or midline shift. CXR: Left pleural effusion, left retrocardiac opacity  Medications: vanc 1 g, cefepime 1 g, haldol 2.5 mg mg x 2, ativan 1 mg x 2, versed 2 mg x 1, NS 2 L

## 2019-02-27 NOTE — H&P ADULT - PROBLEM SELECTOR PLAN 1
- pt presenting from group home with encephalopathy. Broad differential includes neurogenic, metabolic, infectious, hypercapnia, drug induced. CTH negative for acute pathology. No uremia, hypoglycemia, low suspicion for hepatic encephalopathy. Pt afebrile w/o leukocytosis, UA + for mod jaja and LEC, CXR poor study. Pt w/ hx of hypercapnic respiratory failure, on VBG pCO2 59 w/ bicarb 29 and no acidemia. No recent changes in outpatient medication, no reported incidence of medication non-adherence.   - RVP, blood cx x2, f/u urine cx  - monitor off abx at this time, pt recently finished course of cephalexin 500 mg bid

## 2019-02-27 NOTE — ED ADULT NURSE REASSESSMENT NOTE - NS ED NURSE REASSESS COMMENT FT1
Vital signs stable as documented. Pt calm and cooperative at this time. Aide at bedside. Report given off to [facility]SS[facility] 2 RN. no acute distress. Awaiting bed dispo.

## 2019-02-27 NOTE — H&P ADULT - NSHPSOCIALHISTORY_GEN_ALL_CORE
Pt lives at [facility], has been there for 12 years. No history of smoking or alcohol use.

## 2019-02-27 NOTE — H&P ADULT - NSHPLABSRESULTS_GEN_ALL_CORE
14.2   8.52  )-----------( 290      ( 26 Feb 2019 18:17 )             45.4   02-26    145  |  102  |  15  ----------------------------<  87  3.8   |  29  |  0.62    Ca    9.8      26 Feb 2019 18:17  Phos  3.6     02-26  Mg     1.7     02-26    TPro  8.0  /  Alb  3.8  /  TBili  0.3  /  DBili  x   /  AST  58<H>  /  ALT  69<H>  /  AlkPhos  132<H>  02-26    < from: Xray Chest 1 View- PORTABLE-Urgent (02.26.19 @ 19:04) >    Left pleural effusion.  Nonspecific left retrocardiac opacity.    < end of copied text >

## 2019-02-27 NOTE — H&P ADULT - NSHPPHYSICALEXAM_GEN_ALL_CORE
PHYSICAL EXAM:  Vital Signs Last 24 Hrs  T(C): 37.3 (02-26-19 @ 18:21)  T(F): 99.1 (02-26-19 @ 18:21), Max: 99.1 (02-26-19 @ 18:21)  HR: 100 (02-27-19 @ 09:26) (82 - 100)  BP: 128/81 (02-27-19 @ 09:26)  BP(mean): --  RR: 18 (02-27-19 @ 09:26) (17 - 18)  SpO2: 98% (02-27-19 @ 09:26) (97% - 98%)  Wt(kg): --    Constitutional: NAD, awake and alert  EYES: EOMI  ENT:  Normal Hearing, no tonsillar exudates   Neck: Soft and supple , no thyromegaly   Respiratory: Breath sounds are clear bilaterally, No wheezing, rales or rhonchi  Cardiovascular: S1 and S2, regular rate and rhythm, no Murmurs, gallops or rubs, no JVD,    Gastrointestinal: Bowel Sounds present, soft, nontender, nondistended, no guarding, no rebound  Extremities: No cyanosis or clubbing; warm to touch  Vascular: 2+ peripheral pulses lower ex  Neurological: No focal deficits, CN II-XII intact bilaterally, sensation to light touch intact in all extremities, gait intact. Pupils are equally reactive to light and symmetrical in size.   Musculoskeletal: 0/5 strength b/l lower extremities; no joint swelling.  Skin: No rashes  Psych: no depression or anhedonia, AAOx0  HEME: no bruises, no nose bleeds

## 2019-02-28 DIAGNOSIS — R13.10 DYSPHAGIA, UNSPECIFIED: ICD-10-CM

## 2019-02-28 DIAGNOSIS — R93.89 ABNORMAL FINDINGS ON DIAGNOSTIC IMAGING OF OTHER SPECIFIED BODY STRUCTURES: ICD-10-CM

## 2019-02-28 DIAGNOSIS — G80.9 CEREBRAL PALSY, UNSPECIFIED: ICD-10-CM

## 2019-02-28 LAB
ANION GAP SERPL CALC-SCNC: 14 MMO/L — SIGNIFICANT CHANGE UP (ref 7–14)
BACTERIA UR CULT: SIGNIFICANT CHANGE UP
BUN SERPL-MCNC: 6 MG/DL — LOW (ref 7–23)
CALCIUM SERPL-MCNC: 8.6 MG/DL — SIGNIFICANT CHANGE UP (ref 8.4–10.5)
CHLORIDE SERPL-SCNC: 104 MMOL/L — SIGNIFICANT CHANGE UP (ref 98–107)
CO2 SERPL-SCNC: 26 MMOL/L — SIGNIFICANT CHANGE UP (ref 22–31)
CREAT SERPL-MCNC: 0.62 MG/DL — SIGNIFICANT CHANGE UP (ref 0.5–1.3)
GLUCOSE SERPL-MCNC: 86 MG/DL — SIGNIFICANT CHANGE UP (ref 70–99)
HCT VFR BLD CALC: 41.2 % — SIGNIFICANT CHANGE UP (ref 34.5–45)
HGB BLD-MCNC: 13 G/DL — SIGNIFICANT CHANGE UP (ref 11.5–15.5)
MCHC RBC-ENTMCNC: 30.9 PG — SIGNIFICANT CHANGE UP (ref 27–34)
MCHC RBC-ENTMCNC: 31.6 % — LOW (ref 32–36)
MCV RBC AUTO: 97.9 FL — SIGNIFICANT CHANGE UP (ref 80–100)
NRBC # FLD: 0 K/UL — LOW (ref 25–125)
PLATELET # BLD AUTO: 304 K/UL — SIGNIFICANT CHANGE UP (ref 150–400)
PMV BLD: 10.3 FL — SIGNIFICANT CHANGE UP (ref 7–13)
POTASSIUM SERPL-MCNC: 3.6 MMOL/L — SIGNIFICANT CHANGE UP (ref 3.5–5.3)
POTASSIUM SERPL-SCNC: 3.6 MMOL/L — SIGNIFICANT CHANGE UP (ref 3.5–5.3)
RBC # BLD: 4.21 M/UL — SIGNIFICANT CHANGE UP (ref 3.8–5.2)
RBC # FLD: 12.4 % — SIGNIFICANT CHANGE UP (ref 10.3–14.5)
SODIUM SERPL-SCNC: 144 MMOL/L — SIGNIFICANT CHANGE UP (ref 135–145)
SPECIMEN SOURCE: SIGNIFICANT CHANGE UP
WBC # BLD: 6.13 K/UL — SIGNIFICANT CHANGE UP (ref 3.8–10.5)
WBC # FLD AUTO: 6.13 K/UL — SIGNIFICANT CHANGE UP (ref 3.8–10.5)

## 2019-02-28 PROCEDURE — 99233 SBSQ HOSP IP/OBS HIGH 50: CPT

## 2019-02-28 PROCEDURE — 71250 CT THORAX DX C-: CPT | Mod: 26

## 2019-02-28 RX ORDER — IPRATROPIUM/ALBUTEROL SULFATE 18-103MCG
3 AEROSOL WITH ADAPTER (GRAM) INHALATION EVERY 6 HOURS
Refills: 0 | Status: DISCONTINUED | OUTPATIENT
Start: 2019-02-28 | End: 2019-03-15

## 2019-02-28 RX ADMIN — LEVETIRACETAM 400 MILLIGRAM(S): 250 TABLET, FILM COATED ORAL at 07:09

## 2019-02-28 RX ADMIN — Medication 100 MILLIGRAM(S): at 13:37

## 2019-02-28 RX ADMIN — Medication 100 MILLIGRAM(S): at 07:09

## 2019-02-28 RX ADMIN — LEVETIRACETAM 400 MILLIGRAM(S): 250 TABLET, FILM COATED ORAL at 17:44

## 2019-02-28 RX ADMIN — Medication 1 MILLIGRAM(S): at 13:37

## 2019-02-28 RX ADMIN — OLANZAPINE 7.5 MILLIGRAM(S): 15 TABLET, FILM COATED ORAL at 13:36

## 2019-02-28 RX ADMIN — PANTOPRAZOLE SODIUM 40 MILLIGRAM(S): 20 TABLET, DELAYED RELEASE ORAL at 07:10

## 2019-02-28 RX ADMIN — Medication 0.5 MILLIGRAM(S): at 17:43

## 2019-02-28 RX ADMIN — Medication 1 SPRAY(S): at 07:09

## 2019-02-28 RX ADMIN — Medication 1 SPRAY(S): at 17:44

## 2019-02-28 RX ADMIN — Medication 1 DROP(S): at 07:10

## 2019-02-28 RX ADMIN — Medication 325 MILLIGRAM(S): at 13:36

## 2019-02-28 RX ADMIN — CEFTRIAXONE 100 GRAM(S): 500 INJECTION, POWDER, FOR SOLUTION INTRAMUSCULAR; INTRAVENOUS at 13:38

## 2019-02-28 RX ADMIN — Medication 0.5 MILLIGRAM(S): at 07:11

## 2019-02-28 RX ADMIN — Medication 81 MILLIGRAM(S): at 13:37

## 2019-02-28 RX ADMIN — Medication 100 MILLIGRAM(S): at 17:43

## 2019-02-28 RX ADMIN — ENOXAPARIN SODIUM 40 MILLIGRAM(S): 100 INJECTION SUBCUTANEOUS at 13:35

## 2019-02-28 RX ADMIN — Medication 100 MILLIGRAM(S): at 21:51

## 2019-02-28 RX ADMIN — Medication 1 TABLET(S): at 13:36

## 2019-02-28 RX ADMIN — Medication 1 DROP(S): at 17:44

## 2019-02-28 NOTE — PROGRESS NOTE ADULT - SUBJECTIVE AND OBJECTIVE BOX
Patient is a 53y old  Female who presents with a chief complaint of     SUBJECTIVE / OVERNIGHT EVENTS: No acute events.     MEDICATIONS  (STANDING):  artificial  tears Solution 1 Drop(s) Both EYES two times a day  aspirin  chewable 81 milliGRAM(s) Oral daily  benztropine 0.5 milliGRAM(s) Oral two times a day  buDESOnide   0.5 milliGRAM(s) Respule 0.5 milliGRAM(s) Inhalation daily  cefTRIAXone   IVPB 1 Gram(s) IV Intermittent every 24 hours  docusate sodium 100 milliGRAM(s) Oral three times a day  enoxaparin Injectable 40 milliGRAM(s) SubCutaneous every 24 hours  ferrous    sulfate 325 milliGRAM(s) Oral daily  fluticasone propionate 50 MICROgram(s)/spray Nasal Spray 1 Spray(s) Both Nostrils two times a day  folic acid 1 milliGRAM(s) Oral daily  levETIRAcetam  IVPB 1000 milliGRAM(s) IV Intermittent every 12 hours  multivitamin 1 Tablet(s) Oral daily  OLANZapine 7.5 milliGRAM(s) Oral daily  pantoprazole    Tablet 40 milliGRAM(s) Oral before breakfast  phenytoin   Capsule 100 milliGRAM(s) Oral daily    MEDICATIONS  (PRN):  OLANZapine Injectable 2.5 milliGRAM(s) IntraMuscular once PRN severe agitation      Vital Signs Last 24 Hrs  T(C): 36.6 (2019 05:48), Max: 36.6 (2019 05:48)  T(F): 97.8 (2019 05:48), Max: 97.8 (2019 05:48)  HR: 93 (2019 05:48) (84 - 100)  BP: 132/94 (2019 05:48) (132/76 - 148/99)  BP(mean): --  RR: 18 (2019 05:48) (18 - 18)  SpO2: 90% (2019 05:48) (90% - 97%)  CAPILLARY BLOOD GLUCOSE        I&O's Summary      PHYSICAL EXAM:  GENERAL: NAD  HEAD:  Atraumatic, Normocephalic  EYES: EOMI, PERRLA, conjunctiva and sclera clear  NECK: Supple, No JVD  CHEST/LUNG: Clear to auscultation bilaterally; No wheeze  HEART: Regular rate and rhythm; No murmurs, rubs, or gallops  ABDOMEN: Soft, Nontender, Nondistended; Bowel sounds present  EXTREMITIES:  2+ Peripheral Pulses, No clubbing, cyanosis, or edema. Legs contracted.   PSYCH: Awake and alert, non-verbal   NEUROLOGY: Does not follow commands, face symmetric, moves upper extremities    SKIN: No rashes or lesions    LABS:                        13.0   6.13  )-----------( 304      ( 2019 06:10 )             41.2         144  |  104  |  6<L>  ----------------------------<  86  3.6   |  26  |  0.62    Ca    8.6      2019 06:10  Phos  3.6       Mg     1.7         TPro  8.0  /  Alb  3.8  /  TBili  0.3  /  DBili  x   /  AST  58<H>  /  ALT  69<H>  /  AlkPhos  132<H>            Urinalysis Basic - ( 2019 22:33 )    Color: YELLOW / Appearance: CLEAR / S.031 / pH: 6.5  Gluc: NEGATIVE / Ketone: MODERATE  / Bili: NEGATIVE / Urobili: NORMAL   Blood: TRACE / Protein: MODERATE / Nitrite: NEGATIVE   Leuk Esterase: LARGE / RBC: 5-10 / WBC 25-50   Sq Epi: OCC / Non Sq Epi: x / Bacteria: MODERATE        RADIOLOGY & ADDITIONAL TESTS:    Imaging Personally Reviewed:    Consultant(s) Notes Reviewed:      Care Discussed with Consultants/Other Providers:

## 2019-02-28 NOTE — PROGRESS NOTE ADULT - PROBLEM SELECTOR PLAN 5
- dysphagia 1 diet   - aspiration precautions - pt has not had BM for several days  - c/w senna, colace, miralax  - if continues to not have BM use magnesium citrate

## 2019-02-28 NOTE — PROGRESS NOTE ADULT - PROBLEM SELECTOR PLAN 4
Lovenox for VTE prophylaxis  Regular diet - pt has not had BM for several days  - c/w senna, colace, miralax  - if continues to not have BM use magnesium citrate - continue w/ home Keppra   - phenytoin level low, increased to 100 mg bid (clarify home dose)

## 2019-02-28 NOTE — PROGRESS NOTE ADULT - PROBLEM SELECTOR PLAN 3
- pt has not had BM for several days  - c/w senna, colace, miralax  - if continues to not have BM use magnesium citrate - continue w/ home keppra   - phenytoin level low, increased to 100 mg bid (clarify home dose) Continue with Zyprexa

## 2019-02-28 NOTE — PROGRESS NOTE ADULT - PROBLEM SELECTOR PLAN 2
- continue w/ home keppra   - phenytoin level low, increased to 100 mg bid L pleural effusion and L retrocardiac opacity on CXR, also w/ mild hypoxia   No fevers or leukocytosis, monitor off antibiotics   Continue nebs and Pulmicort  Obtain CT chest non-con

## 2019-02-28 NOTE — PROGRESS NOTE ADULT - PROBLEM SELECTOR PLAN 1
- pt presenting from group home with encephalopathy. Broad differential includes neurogenic, metabolic, infectious, hypercapnia, drug induced. CTH negative for acute pathology. No uremia, hypoglycemia, low suspicion for hepatic encephalopathy. Pt afebrile w/o leukocytosis, UA + for mod jaja and LEC, CXR poor study. Pt w/ hx of hypercapnic respiratory failure, on VBG pCO2 59 w/ bicarb 29 and no acidemia. No recent changes in outpatient medication, no reported incidence of medication non-adherence.   - RVP, blood cx x2, f/u urine cx  - monitor off abx at this time, pt recently finished course of cephalexin 500 mg bid Acute encephalopathy, possibly metabolic   - RVP and Urine cultures negative   - obtain CT chest   - monitor off antibiotics for now

## 2019-03-01 DIAGNOSIS — R91.1 SOLITARY PULMONARY NODULE: ICD-10-CM

## 2019-03-01 DIAGNOSIS — F79 UNSPECIFIED INTELLECTUAL DISABILITIES: ICD-10-CM

## 2019-03-01 DIAGNOSIS — R94.5 ABNORMAL RESULTS OF LIVER FUNCTION STUDIES: ICD-10-CM

## 2019-03-01 LAB
ALBUMIN SERPL ELPH-MCNC: 3.2 G/DL — LOW (ref 3.3–5)
ALP SERPL-CCNC: 265 U/L — HIGH (ref 40–120)
ALT FLD-CCNC: 358 U/L — HIGH (ref 4–33)
ANION GAP SERPL CALC-SCNC: 15 MMO/L — HIGH (ref 7–14)
AST SERPL-CCNC: 398 U/L — HIGH (ref 4–32)
BILIRUB DIRECT SERPL-MCNC: 0.9 MG/DL — HIGH (ref 0.1–0.2)
BILIRUB SERPL-MCNC: 1.5 MG/DL — HIGH (ref 0.2–1.2)
BUN SERPL-MCNC: 7 MG/DL — SIGNIFICANT CHANGE UP (ref 7–23)
CALCIUM SERPL-MCNC: 8.6 MG/DL — SIGNIFICANT CHANGE UP (ref 8.4–10.5)
CHLORIDE SERPL-SCNC: 107 MMOL/L — SIGNIFICANT CHANGE UP (ref 98–107)
CO2 SERPL-SCNC: 24 MMOL/L — SIGNIFICANT CHANGE UP (ref 22–31)
CREAT SERPL-MCNC: 0.54 MG/DL — SIGNIFICANT CHANGE UP (ref 0.5–1.3)
GLUCOSE SERPL-MCNC: 85 MG/DL — SIGNIFICANT CHANGE UP (ref 70–99)
HAV IGM SER-ACNC: NONREACTIVE — SIGNIFICANT CHANGE UP
HBV CORE IGM SER-ACNC: NONREACTIVE — SIGNIFICANT CHANGE UP
HBV SURFACE AG SER-ACNC: NONREACTIVE — SIGNIFICANT CHANGE UP
HCT VFR BLD CALC: 40.6 % — SIGNIFICANT CHANGE UP (ref 34.5–45)
HCV AB S/CO SERPL IA: 0.14 S/CO — SIGNIFICANT CHANGE UP (ref 0–0.79)
HCV AB SERPL-IMP: SIGNIFICANT CHANGE UP
HGB BLD-MCNC: 12.8 G/DL — SIGNIFICANT CHANGE UP (ref 11.5–15.5)
MCHC RBC-ENTMCNC: 31.5 % — LOW (ref 32–36)
MCHC RBC-ENTMCNC: 31.5 PG — SIGNIFICANT CHANGE UP (ref 27–34)
MCV RBC AUTO: 100 FL — SIGNIFICANT CHANGE UP (ref 80–100)
NRBC # FLD: 0 K/UL — LOW (ref 25–125)
PLATELET # BLD AUTO: 316 K/UL — SIGNIFICANT CHANGE UP (ref 150–400)
PMV BLD: 10.2 FL — SIGNIFICANT CHANGE UP (ref 7–13)
POTASSIUM SERPL-MCNC: 4 MMOL/L — SIGNIFICANT CHANGE UP (ref 3.5–5.3)
POTASSIUM SERPL-SCNC: 4 MMOL/L — SIGNIFICANT CHANGE UP (ref 3.5–5.3)
PROT SERPL-MCNC: 6.9 G/DL — SIGNIFICANT CHANGE UP (ref 6–8.3)
RBC # BLD: 4.06 M/UL — SIGNIFICANT CHANGE UP (ref 3.8–5.2)
RBC # FLD: 12.2 % — SIGNIFICANT CHANGE UP (ref 10.3–14.5)
SODIUM SERPL-SCNC: 146 MMOL/L — HIGH (ref 135–145)
WBC # BLD: 5.65 K/UL — SIGNIFICANT CHANGE UP (ref 3.8–10.5)
WBC # FLD AUTO: 5.65 K/UL — SIGNIFICANT CHANGE UP (ref 3.8–10.5)

## 2019-03-01 PROCEDURE — 99233 SBSQ HOSP IP/OBS HIGH 50: CPT

## 2019-03-01 PROCEDURE — 90792 PSYCH DIAG EVAL W/MED SRVCS: CPT

## 2019-03-01 RX ADMIN — Medication 100 MILLIGRAM(S): at 17:26

## 2019-03-01 RX ADMIN — OLANZAPINE 7.5 MILLIGRAM(S): 15 TABLET, FILM COATED ORAL at 13:46

## 2019-03-01 RX ADMIN — LEVETIRACETAM 400 MILLIGRAM(S): 250 TABLET, FILM COATED ORAL at 05:47

## 2019-03-01 RX ADMIN — Medication 81 MILLIGRAM(S): at 13:46

## 2019-03-01 RX ADMIN — ENOXAPARIN SODIUM 40 MILLIGRAM(S): 100 INJECTION SUBCUTANEOUS at 13:46

## 2019-03-01 RX ADMIN — Medication 1 TABLET(S): at 13:46

## 2019-03-01 RX ADMIN — Medication 1 MILLIGRAM(S): at 13:46

## 2019-03-01 RX ADMIN — Medication 1 SPRAY(S): at 17:26

## 2019-03-01 RX ADMIN — Medication 325 MILLIGRAM(S): at 13:47

## 2019-03-01 RX ADMIN — Medication 1 SPRAY(S): at 05:46

## 2019-03-01 RX ADMIN — Medication 3 MILLILITER(S): at 22:05

## 2019-03-01 RX ADMIN — Medication 100 MILLIGRAM(S): at 13:46

## 2019-03-01 RX ADMIN — Medication 0.5 MILLIGRAM(S): at 10:25

## 2019-03-01 RX ADMIN — Medication 0.5 MILLIGRAM(S): at 05:46

## 2019-03-01 RX ADMIN — PANTOPRAZOLE SODIUM 40 MILLIGRAM(S): 20 TABLET, DELAYED RELEASE ORAL at 05:46

## 2019-03-01 RX ADMIN — Medication 3 MILLILITER(S): at 05:50

## 2019-03-01 RX ADMIN — Medication 1 DROP(S): at 17:26

## 2019-03-01 RX ADMIN — Medication 100 MILLIGRAM(S): at 05:46

## 2019-03-01 RX ADMIN — Medication 100 MILLIGRAM(S): at 22:10

## 2019-03-01 RX ADMIN — Medication 1 DROP(S): at 05:46

## 2019-03-01 RX ADMIN — LEVETIRACETAM 400 MILLIGRAM(S): 250 TABLET, FILM COATED ORAL at 17:26

## 2019-03-01 NOTE — PROGRESS NOTE ADULT - PROBLEM SELECTOR PLAN 2
- f/up hepatitis panel   - Abdominal US, CBD dilatation on CT chest but no abdominal tenderness   - avoid hepatotoxic medications   - trend LFTs   - hepatology consult

## 2019-03-01 NOTE — CONSULT NOTE ADULT - ATTENDING COMMENTS
53 year old non-verbal female with CP who was admitted for change in behavior. Found to have an increase in LFTs during hospitallization. Increase is consistent with acute biliary obstruction, drug reaction or hepatitis.    Plan: MRCP, hepatitis serologies. Hepatology consult on Monday.

## 2019-03-01 NOTE — BEHAVIORAL HEALTH ASSESSMENT NOTE - NSBHMEDSOTHERFT_PSY_A_CORE
Zyprexa 7.5 mg qhs  Cogentin 0.5 mg daily  Keppra 1000 mg BID  Phenytoin, extended release 100 mg BID  ASA 81

## 2019-03-01 NOTE — BEHAVIORAL HEALTH ASSESSMENT NOTE - NSBHCHARTREVIEWIMAGING_PSY_A_CORE FT
< from: CT Chest No Cont (02.28.19 @ 18:23) >    IMPRESSION:     No consolidation, or pleural effusion or significant atelectasis.   Prominent left cardiophrenic pericardial fat pad.    New 7 mm nodular density within the basilar right lower lobe (series 2   image 58) is nonspecific and may represent a focus of mucoid impaction.   Recommend follow-up chest CT in 3 months to determine stability.    Mild CBD dilatation.    < end of copied text >

## 2019-03-01 NOTE — CONSULT NOTE ADULT - SUBJECTIVE AND OBJECTIVE BOX
Chief Complaint:  Patient is a 53y old  Female who presents with a chief complaint of change in behavior    HPI:  54 yo woman w/ PMH of CP(non verbal, does not ambulate), intractable [facility]/ [facility]. Pt is non-verbal at baseline. Per discussion with group home staff pthas had behavioral changes over the past week, increasingly aggressive, scratching individuals. No fever, chills, SOB, CP. Non productive cough for the past week. One episode of emesis last week.   Topamax (Unknown)      Home Medications:    Hospital Medications:  ALBUTerol/ipratropium for Nebulization 3 milliLiter(s) Nebulizer every 6 hours PRN  artificial  tears Solution 1 Drop(s) Both EYES two times a day  aspirin  chewable 81 milliGRAM(s) Oral daily  benztropine 0.5 milliGRAM(s) Oral two times a day  buDESOnide   0.5 milliGRAM(s) Respule 0.5 milliGRAM(s) Inhalation daily  docusate sodium 100 milliGRAM(s) Oral three times a day  enoxaparin Injectable 40 milliGRAM(s) SubCutaneous every 24 hours  ferrous    sulfate 325 milliGRAM(s) Oral daily  fluticasone propionate 50 MICROgram(s)/spray Nasal Spray 1 Spray(s) Both Nostrils two times a day  folic acid 1 milliGRAM(s) Oral daily  levETIRAcetam  IVPB 1000 milliGRAM(s) IV Intermittent every 12 hours  multivitamin 1 Tablet(s) Oral daily  OLANZapine 7.5 milliGRAM(s) Oral daily  OLANZapine Injectable 2.5 milliGRAM(s) IntraMuscular once PRN  pantoprazole    Tablet 40 milliGRAM(s) Oral before breakfast  phenytoin   Capsule 100 milliGRAM(s) Oral two times a day      PMHX/PSHX:  Intellectual disability  Constipation  Seizure disorder  Cerebral palsy  No significant past surgical history      Family history:  No pertinent family history in first degree relatives  No pertinent family history in first degree relatives      Social History:     ROS:     General:  No wt loss, fevers, chills, night sweats, fatigue,   Eyes:  Good vision, no reported pain  ENT:  No sore throat, pain, runny nose, dysphagia  CV:  No pain, palpitations, hypo/hypertension  Resp:  No dyspnea, cough, tachypnea, wheezing  GI:  See HPI  :  No pain, bleeding, incontinence, nocturia  Muscle:  No pain, weakness  Neuro:  No weakness, tingling, memory problems  Psych:  No fatigue, insomnia, mood problems, depression  Endocrine:  No polyuria, polydipsia, cold/heat intolerance  Heme:  No petechiae, ecchymosis, easy bruisability  Skin:  No rash, edema      PHYSICAL EXAM:     GENERAL:  Appears stated age, well-groomed, well-nourished, no distress  HEENT:  NC/AT,  conjunctivae clear and pink,  no JVD  CHEST:  Full & symmetric excursion, no increased effort, breath sounds clear  HEART:  Regular rhythm, S1, S2, no murmur/rub/S3/S4, no abdominal bruit, no edema  ABDOMEN:  Soft, non-tender, non-distended, normoactive bowel sounds,  no masses , no hepatosplenomegaly  EXTREMITIES:  no cyanosis,clubbing or edema  SKIN:  No rash/erythema/ecchymoses/petechiae/wounds/abscess/warm/dry  NEURO:  Alert, oriented    Vital Signs:  Vital Signs Last 24 Hrs  T(C): 36.9 (01 Mar 2019 13:25), Max: 36.9 (28 Feb 2019 14:46)  T(F): 98.4 (01 Mar 2019 13:25), Max: 98.5 (01 Mar 2019 05:36)  HR: 92 (01 Mar 2019 13:25) (88 - 95)  BP: 134/81 (01 Mar 2019 13:25) (116/86 - 135/85)  BP(mean): --  RR: 18 (01 Mar 2019 13:25) (18 - 20)  SpO2: 96% (01 Mar 2019 13:25) (94% - 96%)  Daily     Daily     LABS:                        12.8   5.65  )-----------( 316      ( 01 Mar 2019 05:45 )             40.6     03-01    146<H>  |  107  |  7   ----------------------------<  85  4.0   |  24  |  0.54    Ca    8.6      01 Mar 2019 05:45    TPro  6.9  /  Alb  3.2<L>  /  TBili  1.5<H>  /  DBili  0.9<H>  /  AST  398<H>  /  ALT  358<H>  /  AlkPhos  265<H>  03-01    LIVER FUNCTIONS - ( 01 Mar 2019 05:45 )  Alb: 3.2 g/dL / Pro: 6.9 g/dL / ALK PHOS: 265 u/L / ALT: 358 u/L / AST: 398 u/L / GGT: x                   Imaging: Chief Complaint:  Patient is a 53y old  Female who presents with a chief complaint of change in behavior  Patient is nonverbal, history per chart.  HPI:  52 yo woman w/ PMH of CP(non verbal, does not ambulate), intractable [facility]. Pt is non-verbal at baseline. Per discussion with group home staff pthas had behavioral changes over the past week, increasingly aggressive, scratching individuals. No fever, chills, SOB, CP. Non productive cough for the past week. One episode of emesis last week. Home meds include phenytoin, keppra, olanzapine, aspirin which seem to be relatively unchanged in recent months as per chart review. The patient was given a course of cephalexin for E.Coli UTI on 2/16. On presentation this admission she had a dose of cefepime.  Per the patient's nurse she has had an inconsistent appetite this admission but no n/v, abdominal pain or diarrhea.    Topamax (Unknown)      Home Medications:    Hospital Medications:  ALBUTerol/ipratropium for Nebulization 3 milliLiter(s) Nebulizer every 6 hours PRN  artificial  tears Solution 1 Drop(s) Both EYES two times a day  aspirin  chewable 81 milliGRAM(s) Oral daily  benztropine 0.5 milliGRAM(s) Oral two times a day  buDESOnide   0.5 milliGRAM(s) Respule 0.5 milliGRAM(s) Inhalation daily  docusate sodium 100 milliGRAM(s) Oral three times a day  enoxaparin Injectable 40 milliGRAM(s) SubCutaneous every 24 hours  ferrous    sulfate 325 milliGRAM(s) Oral daily  fluticasone propionate 50 MICROgram(s)/spray Nasal Spray 1 Spray(s) Both Nostrils two times a day  folic acid 1 milliGRAM(s) Oral daily  levETIRAcetam  IVPB 1000 milliGRAM(s) IV Intermittent every 12 hours  multivitamin 1 Tablet(s) Oral daily  OLANZapine 7.5 milliGRAM(s) Oral daily  OLANZapine Injectable 2.5 milliGRAM(s) IntraMuscular once PRN  pantoprazole    Tablet 40 milliGRAM(s) Oral before breakfast  phenytoin   Capsule 100 milliGRAM(s) Oral two times a day      PMHX/PSHX:  Intellectual disability  Constipation  Seizure disorder  Cerebral palsy  No significant past surgical history      Family history:  No pertinent family history in first degree relatives  No pertinent family history in first degree relatives      Social History:   lives in group home  ROS:     not obtained    PHYSICAL EXAM:     GENERAL:  Appears stated age, well-groomed, well-nourished, no distress  HEENT:  NC/AT,  conjunctivae clear and pink,  no JVD  CHEST:  Full & symmetric excursion, no increased effort, breath sounds clear  HEART:  Regular rhythm, S1, S2, no murmur/rub/S3/S4, no abdominal bruit, no edema  ABDOMEN:  Soft, non-tender, non-distended, normoactive bowel sounds,  no masses , no hepatosplenomegaly  EXTREMITIES:  no cyanosis,clubbing or edema  SKIN:  No rash/erythema/ecchymoses/petechiae/wounds/abscess/warm/dry  NEURO:  nonverbal  Vital Signs:  Vital Signs Last 24 Hrs  T(C): 36.9 (01 Mar 2019 13:25), Max: 36.9 (28 Feb 2019 14:46)  T(F): 98.4 (01 Mar 2019 13:25), Max: 98.5 (01 Mar 2019 05:36)  HR: 92 (01 Mar 2019 13:25) (88 - 95)  BP: 134/81 (01 Mar 2019 13:25) (116/86 - 135/85)  BP(mean): --  RR: 18 (01 Mar 2019 13:25) (18 - 20)  SpO2: 96% (01 Mar 2019 13:25) (94% - 96%)  Daily     Daily     LABS:                        12.8   5.65  )-----------( 316      ( 01 Mar 2019 05:45 )             40.6     03-01    146<H>  |  107  |  7   ----------------------------<  85  4.0   |  24  |  0.54    Ca    8.6      01 Mar 2019 05:45    TPro  6.9  /  Alb  3.2<L>  /  TBili  1.5<H>  /  DBili  0.9<H>  /  AST  398<H>  /  ALT  358<H>  /  AlkPhos  265<H>  03-01    LIVER FUNCTIONS - ( 01 Mar 2019 05:45 )  Alb: 3.2 g/dL / Pro: 6.9 g/dL / ALK PHOS: 265 u/L / ALT: 358 u/L / AST: 398 u/L / GGT: x                   Imaging:

## 2019-03-01 NOTE — PROGRESS NOTE ADULT - PROBLEM SELECTOR PLAN 1
Acute encephalopathy, possibly metabolic   - RVP and Urine cultures negative   - CXR w/ retrocardiac opacity, but CT chest with no consolidation or effusion   - elevated LFT's and CBD dilatation on CT, f/up hepatitis panel and RUQ sono   - monitoring off antibiotics for now

## 2019-03-01 NOTE — BEHAVIORAL HEALTH ASSESSMENT NOTE - NSBHCHARTREVIEWLAB_PSY_A_CORE FT
Labs reviewed personally [X]                        12.8   5.65  )-----------( 316      ( 01 Mar 2019 05:45 )             40.6     Hgb Trend: 12.8<--, 13.0<--, 14.2<--    03-01    146<H>  |  107  |  7   ----------------------------<  85  4.0   |  24  |  0.54    Ca    8.6      01 Mar 2019 05:45    TPro  6.9  /  Alb  3.2<L>  /  TBili  1.5<H>  /  DBili  0.9<H>  /  AST  398<H>  /  ALT  358<H>  /  AlkPhos  265<H>  03-01    Creatinine Trend: 0.54<--, 0.62<--, 0.62<--, 0.67<--

## 2019-03-01 NOTE — BEHAVIORAL HEALTH ASSESSMENT NOTE - SUMMARY
54 yo F with cerebral palsy (nonverbal, does not ambulate), PMH seizure disorder, PPH agitation on Zyprexa, brought in from group home for AMS characterized by 1 week of increased agitation, aggression, scratching, and not taking medications. Upon admission, patient has been intermittently agitated, making workup difficult. CT chest found CBD dilitation, but further workup with US was not done as patient could not comply with test 2/2 agitation. Since admission, patient's LFTs have greatly increased despite no change in her medication regimen. Patient is seen in bed, making good eye contact and tracking each member of the team appropriately. She appears calm and in no acute distress. At baseline she is nonverbal. She is unable to follow commands, but her baseline ability is unclear.    Given that patient has been on Zyprexa for some time, hepatotoxicity 2/2 Zyprexa is unlikely at this time. Patient has also been taking Keppra and Phenytoin regularly prior to this increase in LFTs. Should the primary team be concerned that they are implicated in her presentation, would defer to neurology to assess and manage AEDs. Given tendency for patient to become agitated and aggressive, would continue standing Zyprexa at this time. Though she has tolerated Zyprexa PRN in the past, it would be reasonable to hold the PRN dose in favor of Ativan given current hepatic concerns, with Ativan being safer until LFTs return to normal.     PLAN:  1. continue Zyprexa 7.5 mg PO qhs   2. d/c cogentin 0.5 mg   3. PRNs: Ativan 1 mg q6 PO/IM/IV PRN agitation, with option to give additional 1 mg if first one is tolerated and second needed (patient has chronic hypercapnia)  **would d/c Zyprexa PRN to avoid additional medications affecting the liver  4. case discussed with team, will continue to follow    Recommend to continue Zyprexa 7.5mg PO qhs.   PRN: Zyprexa 5mg PO/IM q6h for agitation/aggression 54 yo F with cerebral palsy (nonverbal, does not ambulate), PMH seizure disorder, PPH agitation on Zyprexa, brought in from group home for AMS characterized by 1 week of increased agitation, aggression, scratching, and not taking medications. Upon admission, patient has been intermittently agitated, making workup difficult. CT chest found CBD dilitation, but further workup with US was not done as patient could not comply with test 2/2 agitation. Since admission, patient's LFTs have greatly increased despite no change in her medication regimen. Patient is seen in bed, making good eye contact and tracking each member of the team appropriately. She appears calm and in no acute distress. At baseline she is nonverbal. She is unable to follow commands, but her baseline ability is unclear.    Given that patient has been on Zyprexa for some time, hepatotoxicity 2/2 Zyprexa is unlikely at this time. Patient has also been taking Keppra and Phenytoin regularly prior to this increase in LFTs. Should the primary team be concerned that they are implicated in her presentation, would defer to neurology to assess and manage AEDs. Given tendency for patient to become agitated and aggressive, would continue standing Zyprexa at this time. Though she has tolerated Zyprexa PRN in the past, it would be reasonable to hold the PRN dose in favor of Ativan given current hepatic concerns, with Ativan being safer until LFTs return to normal.     PLAN:  1. continue Zyprexa 7.5 mg PO qhs   2. d/c cogentin 0.5 mg   3. PRNs: Ativan 1 mg q6 PO/IM/IV PRN agitation, with option to give additional 1 mg if first one is tolerated and second needed (patient has chronic hypercapnia)  **would d/c Zyprexa PRN to avoid additional medications affecting the liver  4. case discussed with team, will continue to follow 54 yo F with cerebral palsy (nonverbal, does not ambulate), PMH seizure disorder, PPH agitation on Zyprexa, brought in from group home for AMS characterized by 1 week of increased agitation, aggression, scratching, and not taking medications. Upon admission, patient has been intermittently agitated, making workup difficult. CT chest found CBD dilitation, but further workup with US was not done as patient could not comply with test 2/2 agitation. Since admission, patient's LFTs have greatly increased despite no change in her medication regimen. Patient is seen in bed, making good eye contact and tracking each member of the team appropriately. She appears calm and in no acute distress. At baseline she is nonverbal. She is unable to follow commands, but her baseline ability is unclear.    Please note that Zyprexa can cause hepatotoxicity, however given that patient has been on Zyprexa for some time, hepatotoxicity 2/2 Zyprexa is unlikely at this time. Patient has also been taking Keppra and Phenytoin regularly prior to this increase in LFTs. Should the primary team be concerned that they are implicated in her presentation, would defer to neurology to assess and manage AEDs. Given tendency for patient to become agitated and aggressive, would continue standing Zyprexa, as benefits outweigh the risk at this time. Though she has tolerated Zyprexa PRN in the past, it would be reasonable to hold the PRN dose in favor of Ativan given current hepatic concerns, with Ativan being safer until LFTs return to normal.     PLAN:  1. continue Zyprexa 7.5 mg PO qhs   2. d/c cogentin 0.5 mg to avoid polypharmacy.   3. PRNs: Ativan 1 mg q6 PO/IM/IV PRN agitation, with option to give additional 1 mg if first one is tolerated and second needed (patient has chronic hypercapnia)  **would d/c Zyprexa PRN to avoid additional medications affecting the liver  4. case discussed with team, will continue to follow 52 yo F with cerebral palsy (nonverbal, does not ambulate), PMH seizure disorder, PPH agitation on Zyprexa, brought in from group home for AMS characterized by 1 week of increased agitation, aggression, scratching, and not taking medications. Upon admission, patient has been intermittently agitated, making workup difficult. CT chest found CBD dilitation, but further workup with US was not done as patient could not comply with test 2/2 agitation. Since admission, patient's LFTs have greatly increased despite no change in her medication regimen. Patient is seen in bed, making good eye contact and tracking each member of the team appropriately. She appears calm and in no acute distress. At baseline she is nonverbal. She is unable to follow commands, but her baseline ability is unclear.    Please note that Zyprexa can cause hepatotoxicity, however given that patient has been on Zyprexa for some time, hepatotoxicity 2/2 Zyprexa is unlikely at this time. Patient has also been taking Keppra and Phenytoin regularly prior to this increase in LFTs. Should the primary team be concerned that they are implicated in her presentation, would defer to neurology to assess and manage AEDs. Given tendency for patient to become agitated and aggressive, would continue standing Zyprexa, as benefits outweigh the risk at this time. Though she has tolerated Zyprexa PRN in the past, it would be reasonable to hold the PRN dose in favor of Ativan given current hepatic concerns, with Ativan being safer until LFTs return to normal.     PLAN:  1. continue Zyprexa 7.5 mg PO qhs   2. d/c cogentin 0.5 mg to avoid polypharmacy.   3. PRNs: Ativan 1 mg q6 PO/IM/IV PRN agitation, with option to give additional 1 mg if first one is tolerated and second needed (patient has chronic hypercapnia)  **would d/c Zyprexa PRN to avoid additional medications affecting the liver  4. case discussed with team, will continue to follow      ***DO NOT GIVE IM Zyprexa within 4hrs of IM/IV Ativan due to risk of respiratory depression and sedation.

## 2019-03-01 NOTE — BEHAVIORAL HEALTH ASSESSMENT NOTE - RISK ASSESSMENT
Low. No hx of SA known, pt non verbal, unable to amputate, domiciled in group home with frequent supervision.  Not an acute risk of harm to self or others at this time.

## 2019-03-01 NOTE — BEHAVIORAL HEALTH ASSESSMENT NOTE - OTHER
non-verbal Non verbal Group home unable to assess unable to assess, non verbal at baseline unable to participate 2/2 disability non-verbal at baseline as per chart

## 2019-03-01 NOTE — BEHAVIORAL HEALTH ASSESSMENT NOTE - CASE SUMMARY
54 yo F with cerebral palsy (nonverbal, does not ambulate), PMH seizure disorder, PPH agitation on Zyprexa, brought in from group home for AMS characterized by 1 week of increased agitation, aggression, scratching, and not taking medications. Consulted for med management given elevated LFTs, and PRN recommendation for agitation. Patient is seen in bed, alert, non verbal, making good eye contact, calm not agitated. Given that patient has been on Zyprexa for some time for agitation, low suspicion that Zyprexa causing increase in LFTs, however will continue to assess and make changes if needed. Recommend to continue standing Zyprexa with Ativan PRNs as written above. Monitor EKG for qtc, will follow

## 2019-03-01 NOTE — PROGRESS NOTE ADULT - SUBJECTIVE AND OBJECTIVE BOX
Patient is a 53y old  Female who presents with a chief complaint of     SUBJECTIVE / OVERNIGHT EVENTS: No acute events.     MEDICATIONS  (STANDING):  artificial  tears Solution 1 Drop(s) Both EYES two times a day  aspirin  chewable 81 milliGRAM(s) Oral daily  benztropine 0.5 milliGRAM(s) Oral two times a day  buDESOnide   0.5 milliGRAM(s) Respule 0.5 milliGRAM(s) Inhalation daily  docusate sodium 100 milliGRAM(s) Oral three times a day  enoxaparin Injectable 40 milliGRAM(s) SubCutaneous every 24 hours  ferrous    sulfate 325 milliGRAM(s) Oral daily  fluticasone propionate 50 MICROgram(s)/spray Nasal Spray 1 Spray(s) Both Nostrils two times a day  folic acid 1 milliGRAM(s) Oral daily  levETIRAcetam  IVPB 1000 milliGRAM(s) IV Intermittent every 12 hours  multivitamin 1 Tablet(s) Oral daily  OLANZapine 7.5 milliGRAM(s) Oral daily  pantoprazole    Tablet 40 milliGRAM(s) Oral before breakfast  phenytoin   Capsule 100 milliGRAM(s) Oral two times a day    MEDICATIONS  (PRN):  ALBUTerol/ipratropium for Nebulization 3 milliLiter(s) Nebulizer every 6 hours PRN Shortness of Breath and/or Wheezing  OLANZapine Injectable 2.5 milliGRAM(s) IntraMuscular once PRN severe agitation      Vital Signs Last 24 Hrs  T(C): 36.9 (01 Mar 2019 05:36), Max: 36.9 (28 Feb 2019 14:46)  T(F): 98.5 (01 Mar 2019 05:36), Max: 98.5 (01 Mar 2019 05:36)  HR: 88 (01 Mar 2019 05:36) (88 - 95)  BP: 116/86 (01 Mar 2019 05:36) (116/86 - 135/85)  BP(mean): --  RR: 18 (01 Mar 2019 05:36) (18 - 20)  SpO2: 95% (01 Mar 2019 05:36) (94% - 96%)  CAPILLARY BLOOD GLUCOSE        I&O's Summary      PHYSICAL EXAM:  GENERAL: NAD  HEAD:  Atraumatic, Normocephalic  EYES: EOMI, PERRLA, conjunctiva and sclera clear  NECK: Supple, No JVD  CHEST/LUNG: Clear to auscultation bilaterally; No wheeze  HEART: Regular rate and rhythm; No murmurs, rubs, or gallops  ABDOMEN: Soft, Nontender, Nondistended; Bowel sounds present  EXTREMITIES:  2+ Peripheral Pulses, No clubbing, cyanosis, or edema. Legs contracted.   PSYCH: Awake and alert, non-verbal   NEUROLOGY: Does not follow commands, face symmetric, moves upper extremities    SKIN: No rashes or lesions    LABS:                        12.8   5.65  )-----------( 316      ( 01 Mar 2019 05:45 )             40.6     03-01    146<H>  |  107  |  7   ----------------------------<  85  4.0   |  24  |  0.54    Ca    8.6      01 Mar 2019 05:45    TPro  6.9  /  Alb  3.2<L>  /  TBili  1.5<H>  /  DBili  0.9<H>  /  AST  398<H>  /  ALT  358<H>  /  AlkPhos  265<H>  03-01              RADIOLOGY & ADDITIONAL TESTS:    Imaging Personally Reviewed:  CT chest:   No consolidation, or pleural effusion or significant atelectasis.   Prominent left cardiophrenic pericardial fat pad.    New 7 mm nodular density within the basilar right lower lobe (series 2   image 58) is nonspecific and may represent a focus of mucoid impaction.   Recommend follow-up chest CT in 3 months to determine stability.    Mild CBD dilatation.    Consultant(s) Notes Reviewed:      Care Discussed with Consultants/Other Providers:

## 2019-03-01 NOTE — BEHAVIORAL HEALTH ASSESSMENT NOTE - HPI (INCLUDE ILLNESS QUALITY, SEVERITY, DURATION, TIMING, CONTEXT, MODIFYING FACTORS, ASSOCIATED SIGNS AND SYMPTOMS)
Patient is a 53 year old female with cerebral palsy (nonverbal, does not ambulate), PMH seizure disorder, PPH agitation on Zyprexa, brought in from group home for change in mental status. Patient has been increasingly agitated and aggressive, attempting to scratch people. She reportedly has not been taking all of her medications. Upon admission, patient has been intermittently agitated, making workup difficult. CT chest found CBD dilitation, but further workup with US was not done as patient could not comply with test 2/2 agitation. Since admission, patient's LFTs have greatly increased despite no change in her medication regimen. Patient is seen in bed, making good eye contact and tracking each member of the team appropriately. She appears calm and in no acute distress. At baseline she is nonverbal. She is unable to follow commands, but her baseline ability is unclear.

## 2019-03-01 NOTE — BEHAVIORAL HEALTH ASSESSMENT NOTE - NSBHCHARTREVIEWVS_PSY_A_CORE FT
Vital Signs Last 24 Hrs  T(C): 36.9 (01 Mar 2019 13:25), Max: 36.9 (28 Feb 2019 19:21)  T(F): 98.4 (01 Mar 2019 13:25), Max: 98.5 (01 Mar 2019 05:36)  HR: 92 (01 Mar 2019 13:25) (88 - 95)  BP: 134/81 (01 Mar 2019 13:25) (116/86 - 134/81)  RR: 18 (01 Mar 2019 13:25) (18 - 18)  SpO2: 96% (01 Mar 2019 13:25) (95% - 96%)

## 2019-03-02 LAB
ALBUMIN SERPL ELPH-MCNC: 3.4 G/DL — SIGNIFICANT CHANGE UP (ref 3.3–5)
ALBUMIN SERPL ELPH-MCNC: 3.4 G/DL — SIGNIFICANT CHANGE UP (ref 3.3–5)
ALP SERPL-CCNC: 291 U/L — HIGH (ref 40–120)
ALP SERPL-CCNC: 291 U/L — HIGH (ref 40–120)
ALT FLD-CCNC: 511 U/L — HIGH (ref 4–33)
ALT FLD-CCNC: 511 U/L — HIGH (ref 4–33)
ANION GAP SERPL CALC-SCNC: 14 MMO/L — SIGNIFICANT CHANGE UP (ref 7–14)
ANION GAP SERPL CALC-SCNC: 14 MMO/L — SIGNIFICANT CHANGE UP (ref 7–14)
AST SERPL-CCNC: 503 U/L — HIGH (ref 4–32)
AST SERPL-CCNC: 503 U/L — HIGH (ref 4–32)
BILIRUB SERPL-MCNC: 1 MG/DL — SIGNIFICANT CHANGE UP (ref 0.2–1.2)
BILIRUB SERPL-MCNC: 1 MG/DL — SIGNIFICANT CHANGE UP (ref 0.2–1.2)
BUN SERPL-MCNC: 9 MG/DL — SIGNIFICANT CHANGE UP (ref 7–23)
BUN SERPL-MCNC: 9 MG/DL — SIGNIFICANT CHANGE UP (ref 7–23)
CALCIUM SERPL-MCNC: 8.8 MG/DL — SIGNIFICANT CHANGE UP (ref 8.4–10.5)
CALCIUM SERPL-MCNC: 8.8 MG/DL — SIGNIFICANT CHANGE UP (ref 8.4–10.5)
CHLORIDE SERPL-SCNC: 107 MMOL/L — SIGNIFICANT CHANGE UP (ref 98–107)
CHLORIDE SERPL-SCNC: 107 MMOL/L — SIGNIFICANT CHANGE UP (ref 98–107)
CO2 SERPL-SCNC: 25 MMOL/L — SIGNIFICANT CHANGE UP (ref 22–31)
CO2 SERPL-SCNC: 25 MMOL/L — SIGNIFICANT CHANGE UP (ref 22–31)
CREAT SERPL-MCNC: 0.61 MG/DL — SIGNIFICANT CHANGE UP (ref 0.5–1.3)
CREAT SERPL-MCNC: 0.61 MG/DL — SIGNIFICANT CHANGE UP (ref 0.5–1.3)
GLUCOSE SERPL-MCNC: 81 MG/DL — SIGNIFICANT CHANGE UP (ref 70–99)
GLUCOSE SERPL-MCNC: 81 MG/DL — SIGNIFICANT CHANGE UP (ref 70–99)
HCT VFR BLD CALC: 44.6 % — SIGNIFICANT CHANGE UP (ref 34.5–45)
HGB BLD-MCNC: 13.8 G/DL — SIGNIFICANT CHANGE UP (ref 11.5–15.5)
IGA FLD-MCNC: 670 MG/DL — HIGH (ref 70–400)
IGG FLD-MCNC: 1075 MG/DL — SIGNIFICANT CHANGE UP (ref 700–1600)
IGM SERPL-MCNC: 172 MG/DL — SIGNIFICANT CHANGE UP (ref 40–230)
MAGNESIUM SERPL-MCNC: 2 MG/DL — SIGNIFICANT CHANGE UP (ref 1.6–2.6)
MCHC RBC-ENTMCNC: 30.5 PG — SIGNIFICANT CHANGE UP (ref 27–34)
MCHC RBC-ENTMCNC: 30.9 % — LOW (ref 32–36)
MCV RBC AUTO: 98.7 FL — SIGNIFICANT CHANGE UP (ref 80–100)
NRBC # FLD: 0 K/UL — LOW (ref 25–125)
PHOSPHATE SERPL-MCNC: 2.7 MG/DL — SIGNIFICANT CHANGE UP (ref 2.5–4.5)
PLATELET # BLD AUTO: 320 K/UL — SIGNIFICANT CHANGE UP (ref 150–400)
PMV BLD: 10.3 FL — SIGNIFICANT CHANGE UP (ref 7–13)
POTASSIUM SERPL-MCNC: 3.8 MMOL/L — SIGNIFICANT CHANGE UP (ref 3.5–5.3)
POTASSIUM SERPL-MCNC: 3.8 MMOL/L — SIGNIFICANT CHANGE UP (ref 3.5–5.3)
POTASSIUM SERPL-SCNC: 3.8 MMOL/L — SIGNIFICANT CHANGE UP (ref 3.5–5.3)
POTASSIUM SERPL-SCNC: 3.8 MMOL/L — SIGNIFICANT CHANGE UP (ref 3.5–5.3)
PROT SERPL-MCNC: 7.4 G/DL — SIGNIFICANT CHANGE UP (ref 6–8.3)
PROT SERPL-MCNC: 7.4 G/DL — SIGNIFICANT CHANGE UP (ref 6–8.3)
RBC # BLD: 4.52 M/UL — SIGNIFICANT CHANGE UP (ref 3.8–5.2)
RBC # FLD: 12.6 % — SIGNIFICANT CHANGE UP (ref 10.3–14.5)
SODIUM SERPL-SCNC: 146 MMOL/L — HIGH (ref 135–145)
SODIUM SERPL-SCNC: 146 MMOL/L — HIGH (ref 135–145)
WBC # BLD: 6.1 K/UL — SIGNIFICANT CHANGE UP (ref 3.8–10.5)
WBC # FLD AUTO: 6.1 K/UL — SIGNIFICANT CHANGE UP (ref 3.8–10.5)

## 2019-03-02 PROCEDURE — 99233 SBSQ HOSP IP/OBS HIGH 50: CPT

## 2019-03-02 PROCEDURE — 99223 1ST HOSP IP/OBS HIGH 75: CPT | Mod: GC

## 2019-03-02 RX ADMIN — Medication 325 MILLIGRAM(S): at 12:25

## 2019-03-02 RX ADMIN — LEVETIRACETAM 400 MILLIGRAM(S): 250 TABLET, FILM COATED ORAL at 18:30

## 2019-03-02 RX ADMIN — Medication 1 SPRAY(S): at 18:30

## 2019-03-02 RX ADMIN — Medication 1 MILLIGRAM(S): at 12:26

## 2019-03-02 RX ADMIN — Medication 100 MILLIGRAM(S): at 15:02

## 2019-03-02 RX ADMIN — Medication 100 MILLIGRAM(S): at 05:49

## 2019-03-02 RX ADMIN — Medication 100 MILLIGRAM(S): at 18:30

## 2019-03-02 RX ADMIN — Medication 100 MILLIGRAM(S): at 21:22

## 2019-03-02 RX ADMIN — Medication 1 DROP(S): at 18:29

## 2019-03-02 RX ADMIN — Medication 1 TABLET(S): at 12:26

## 2019-03-02 RX ADMIN — Medication 1 MILLIGRAM(S): at 12:45

## 2019-03-02 RX ADMIN — Medication 100 MILLIGRAM(S): at 05:50

## 2019-03-02 RX ADMIN — Medication 1 DROP(S): at 05:49

## 2019-03-02 RX ADMIN — ENOXAPARIN SODIUM 40 MILLIGRAM(S): 100 INJECTION SUBCUTANEOUS at 12:26

## 2019-03-02 RX ADMIN — Medication 0.5 MILLIGRAM(S): at 10:52

## 2019-03-02 RX ADMIN — PANTOPRAZOLE SODIUM 40 MILLIGRAM(S): 20 TABLET, DELAYED RELEASE ORAL at 05:50

## 2019-03-02 RX ADMIN — OLANZAPINE 7.5 MILLIGRAM(S): 15 TABLET, FILM COATED ORAL at 12:26

## 2019-03-02 RX ADMIN — Medication 81 MILLIGRAM(S): at 12:25

## 2019-03-02 RX ADMIN — LEVETIRACETAM 400 MILLIGRAM(S): 250 TABLET, FILM COATED ORAL at 05:49

## 2019-03-02 RX ADMIN — Medication 1 SPRAY(S): at 05:48

## 2019-03-02 NOTE — PROGRESS NOTE ADULT - PROBLEM SELECTOR PLAN 2
- Hep panel negative, CBD dilatation on CT chest but no abdominal tenderness   - Abdominal US-pending, patient unable to tolerate test. Will repeat with pre-medication.  - avoid hepatotoxic medications   - trend LFTs   - autoimmune labs, PCR pending  - Patient unable to tolerate MRCP w/o anesthesia. D/W mother and sister, would not want to proceed with MRCP with sedation at this time.   - F/U hep recs

## 2019-03-02 NOTE — PROGRESS NOTE ADULT - SUBJECTIVE AND OBJECTIVE BOX
Patient is a 53y old  Female who presents with a chief complaint of     SUBJECTIVE / OVERNIGHT EVENTS:    No acute events o/n. Patient non-verbal, d/w mom and sister at bedside.     MEDICATIONS  (STANDING):  artificial  tears Solution 1 Drop(s) Both EYES two times a day  aspirin  chewable 81 milliGRAM(s) Oral daily  buDESOnide   0.5 milliGRAM(s) Respule 0.5 milliGRAM(s) Inhalation daily  docusate sodium 100 milliGRAM(s) Oral three times a day  enoxaparin Injectable 40 milliGRAM(s) SubCutaneous every 24 hours  ferrous    sulfate 325 milliGRAM(s) Oral daily  fluticasone propionate 50 MICROgram(s)/spray Nasal Spray 1 Spray(s) Both Nostrils two times a day  folic acid 1 milliGRAM(s) Oral daily  levETIRAcetam  IVPB 1000 milliGRAM(s) IV Intermittent every 12 hours  multivitamin 1 Tablet(s) Oral daily  OLANZapine 7.5 milliGRAM(s) Oral daily  pantoprazole    Tablet 40 milliGRAM(s) Oral before breakfast  phenytoin   Capsule 100 milliGRAM(s) Oral two times a day    MEDICATIONS  (PRN):  ALBUTerol/ipratropium for Nebulization 3 milliLiter(s) Nebulizer every 6 hours PRN Shortness of Breath and/or Wheezing  LORazepam     Tablet 1 milliGRAM(s) Oral every 6 hours PRN Agitation  LORazepam   Injectable 1 milliGRAM(s) IV Push every 6 hours PRN Agitation  LORazepam   Injectable 2 milliGRAM(s) IV Push once PRN Prior to liver US.        CAPILLARY BLOOD GLUCOSE        I&O's Summary  Vital Signs Last 24 Hrs  T(C): 36.7 (02 Mar 2019 13:57), Max: 36.7 (01 Mar 2019 19:33)  T(F): 98 (02 Mar 2019 13:57), Max: 98.1 (01 Mar 2019 19:33)  HR: 80 (02 Mar 2019 13:57) (75 - 90)  BP: 122/84 (02 Mar 2019 13:57) (122/84 - 135/88)  BP(mean): --  RR: 18 (02 Mar 2019 13:57) (18 - 18)  SpO2: 96% (02 Mar 2019 13:57) (95% - 96%)    PHYSICAL EXAM:  GENERAL: NAD  HEAD:  Atraumatic, Normocephalic  EYES: EOMI, PERRLA, conjunctiva and sclera clear  NECK: Supple, No JVD  CHEST/LUNG: Clear to auscultation bilaterally; No wheeze  HEART: Regular rate and rhythm; No murmurs, rubs, or gallops  ABDOMEN: Soft, Nontender, Nondistended; Bowel sounds present  EXTREMITIES:  2+ Peripheral Pulses, No clubbing, cyanosis, or edema. Legs contracted.   PSYCH: Awake and alert, non-verbal   NEUROLOGY: Does not follow commands, face symmetric, moves upper extremities. contracted, thin ext.   SKIN: No rashes or lesions    LABS:                        13.8   6.10  )-----------( 320      ( 02 Mar 2019 05:45 )             44.6     03-02    146<H>  |  107  |  9   ----------------------------<  81  3.8   |  25  |  0.61    Ca    8.8      02 Mar 2019 05:45  Phos  2.7     03-02  Mg     2.0     03-02    TPro  7.4  /  Alb  3.4  /  TBili  1.0  /  DBili  x   /  AST  503<H>  /  ALT  511<H>  /  AlkPhos  291<H>  03-02              RADIOLOGY & ADDITIONAL TESTS:    Imaging Personally Reviewed:  Consultant(s) Notes Reviewed:  Hep, psych  Care Discussed with Consultants/Other Providers:

## 2019-03-02 NOTE — CHART NOTE - NSCHARTNOTEFT_GEN_A_CORE
Attempted US x 2 with Ativan 1 mg and family accompany pt for US- Pt refusing to cooperate with US- D/w Family who would want to hold off MRCP with sedation and try US again in AM - Added Ativan 2 mg IV prior to US

## 2019-03-02 NOTE — PROGRESS NOTE ADULT - PROBLEM SELECTOR PLAN 1
Acute encephalopathy, possibly metabolic   - RVP and Urine cultures negative   - CXR w/ retrocardiac opacity, but CT chest with no consolidation or effusion   - elevated LFT's and CBD dilatation on CT, f/u RUQ sono   - monitoring off antibiotics for now

## 2019-03-03 DIAGNOSIS — E87.0 HYPEROSMOLALITY AND HYPERNATREMIA: ICD-10-CM

## 2019-03-03 LAB
ALBUMIN SERPL ELPH-MCNC: 3.4 G/DL — SIGNIFICANT CHANGE UP (ref 3.3–5)
ALP SERPL-CCNC: 240 U/L — HIGH (ref 40–120)
ALT FLD-CCNC: 402 U/L — HIGH (ref 4–33)
ANION GAP SERPL CALC-SCNC: 12 MMO/L — SIGNIFICANT CHANGE UP (ref 7–14)
ANION GAP SERPL CALC-SCNC: 15 MMO/L — HIGH (ref 7–14)
AST SERPL-CCNC: 240 U/L — HIGH (ref 4–32)
BILIRUB SERPL-MCNC: 0.5 MG/DL — SIGNIFICANT CHANGE UP (ref 0.2–1.2)
BUN SERPL-MCNC: 12 MG/DL — SIGNIFICANT CHANGE UP (ref 7–23)
BUN SERPL-MCNC: 13 MG/DL — SIGNIFICANT CHANGE UP (ref 7–23)
CALCIUM SERPL-MCNC: 8.6 MG/DL — SIGNIFICANT CHANGE UP (ref 8.4–10.5)
CALCIUM SERPL-MCNC: 9.2 MG/DL — SIGNIFICANT CHANGE UP (ref 8.4–10.5)
CHLORIDE SERPL-SCNC: 106 MMOL/L — SIGNIFICANT CHANGE UP (ref 98–107)
CHLORIDE SERPL-SCNC: 111 MMOL/L — HIGH (ref 98–107)
CO2 SERPL-SCNC: 25 MMOL/L — SIGNIFICANT CHANGE UP (ref 22–31)
CO2 SERPL-SCNC: 27 MMOL/L — SIGNIFICANT CHANGE UP (ref 22–31)
CREAT SERPL-MCNC: 0.55 MG/DL — SIGNIFICANT CHANGE UP (ref 0.5–1.3)
CREAT SERPL-MCNC: 0.68 MG/DL — SIGNIFICANT CHANGE UP (ref 0.5–1.3)
GLUCOSE SERPL-MCNC: 122 MG/DL — HIGH (ref 70–99)
GLUCOSE SERPL-MCNC: 82 MG/DL — SIGNIFICANT CHANGE UP (ref 70–99)
HCT VFR BLD CALC: 45.8 % — HIGH (ref 34.5–45)
HGB BLD-MCNC: 13.4 G/DL — SIGNIFICANT CHANGE UP (ref 11.5–15.5)
INR BLD: 1.02 — SIGNIFICANT CHANGE UP (ref 0.88–1.17)
MCHC RBC-ENTMCNC: 29.3 % — LOW (ref 32–36)
MCHC RBC-ENTMCNC: 31.4 PG — SIGNIFICANT CHANGE UP (ref 27–34)
MCV RBC AUTO: 107.3 FL — HIGH (ref 80–100)
NRBC # FLD: 0 K/UL — LOW (ref 25–125)
PLATELET # BLD AUTO: 219 K/UL — SIGNIFICANT CHANGE UP (ref 150–400)
PMV BLD: 10.3 FL — SIGNIFICANT CHANGE UP (ref 7–13)
POTASSIUM SERPL-MCNC: 3.6 MMOL/L — SIGNIFICANT CHANGE UP (ref 3.5–5.3)
POTASSIUM SERPL-MCNC: 4.3 MMOL/L — SIGNIFICANT CHANGE UP (ref 3.5–5.3)
POTASSIUM SERPL-SCNC: 3.6 MMOL/L — SIGNIFICANT CHANGE UP (ref 3.5–5.3)
POTASSIUM SERPL-SCNC: 4.3 MMOL/L — SIGNIFICANT CHANGE UP (ref 3.5–5.3)
PROT SERPL-MCNC: 7.3 G/DL — SIGNIFICANT CHANGE UP (ref 6–8.3)
PROTHROM AB SERPL-ACNC: 11.3 SEC — SIGNIFICANT CHANGE UP (ref 9.8–13.1)
RBC # BLD: 4.27 M/UL — SIGNIFICANT CHANGE UP (ref 3.8–5.2)
RBC # FLD: 13.1 % — SIGNIFICANT CHANGE UP (ref 10.3–14.5)
SODIUM SERPL-SCNC: 145 MMOL/L — SIGNIFICANT CHANGE UP (ref 135–145)
SODIUM SERPL-SCNC: 151 MMOL/L — HIGH (ref 135–145)
WBC # BLD: 5.38 K/UL — SIGNIFICANT CHANGE UP (ref 3.8–10.5)
WBC # FLD AUTO: 5.38 K/UL — SIGNIFICANT CHANGE UP (ref 3.8–10.5)

## 2019-03-03 PROCEDURE — 99233 SBSQ HOSP IP/OBS HIGH 50: CPT

## 2019-03-03 PROCEDURE — 99232 SBSQ HOSP IP/OBS MODERATE 35: CPT | Mod: GC

## 2019-03-03 RX ORDER — SODIUM CHLORIDE 9 MG/ML
1000 INJECTION, SOLUTION INTRAVENOUS
Refills: 0 | Status: DISCONTINUED | OUTPATIENT
Start: 2019-03-03 | End: 2019-03-03

## 2019-03-03 RX ADMIN — OLANZAPINE 7.5 MILLIGRAM(S): 15 TABLET, FILM COATED ORAL at 12:55

## 2019-03-03 RX ADMIN — Medication 81 MILLIGRAM(S): at 12:54

## 2019-03-03 RX ADMIN — Medication 104 MILLIGRAM(S): at 22:54

## 2019-03-03 RX ADMIN — LEVETIRACETAM 400 MILLIGRAM(S): 250 TABLET, FILM COATED ORAL at 06:24

## 2019-03-03 RX ADMIN — Medication 1 SPRAY(S): at 17:21

## 2019-03-03 RX ADMIN — Medication 1 SPRAY(S): at 06:24

## 2019-03-03 RX ADMIN — Medication 325 MILLIGRAM(S): at 12:54

## 2019-03-03 RX ADMIN — PANTOPRAZOLE SODIUM 40 MILLIGRAM(S): 20 TABLET, DELAYED RELEASE ORAL at 06:24

## 2019-03-03 RX ADMIN — Medication 100 MILLIGRAM(S): at 06:24

## 2019-03-03 RX ADMIN — SODIUM CHLORIDE 60 MILLILITER(S): 9 INJECTION, SOLUTION INTRAVENOUS at 10:18

## 2019-03-03 RX ADMIN — Medication 1 MILLIGRAM(S): at 12:54

## 2019-03-03 RX ADMIN — Medication 1 TABLET(S): at 12:55

## 2019-03-03 RX ADMIN — LEVETIRACETAM 400 MILLIGRAM(S): 250 TABLET, FILM COATED ORAL at 17:21

## 2019-03-03 RX ADMIN — Medication 1 DROP(S): at 06:38

## 2019-03-03 RX ADMIN — Medication 1 DROP(S): at 17:20

## 2019-03-03 RX ADMIN — Medication 100 MILLIGRAM(S): at 22:08

## 2019-03-03 RX ADMIN — ENOXAPARIN SODIUM 40 MILLIGRAM(S): 100 INJECTION SUBCUTANEOUS at 12:49

## 2019-03-03 RX ADMIN — Medication 0.5 MILLIGRAM(S): at 11:13

## 2019-03-03 RX ADMIN — Medication 100 MILLIGRAM(S): at 06:23

## 2019-03-03 NOTE — PROGRESS NOTE ADULT - PROBLEM SELECTOR PLAN 3
likely 2/2 to dehydration and decreased po. No signs of polyuria.   - Na of 151, free water deficit is 2.7L, will correct if D5W, weight and na based  - monitor na while on D5, avoid >8meq per 24 hours.

## 2019-03-03 NOTE — PROGRESS NOTE ADULT - SUBJECTIVE AND OBJECTIVE BOX
Patient is a 53y old  Female who presents with a chief complaint of agitation/UTI (02 Mar 2019 16:34)      SUBJECTIVE / OVERNIGHT EVENTS:  no issues  MEDICATIONS  (STANDING):  artificial  tears Solution 1 Drop(s) Both EYES two times a day  aspirin  chewable 81 milliGRAM(s) Oral daily  buDESOnide   0.5 milliGRAM(s) Respule 0.5 milliGRAM(s) Inhalation daily  dextrose 5%. 1000 milliLiter(s) (60 mL/Hr) IV Continuous <Continuous>  docusate sodium 100 milliGRAM(s) Oral three times a day  enoxaparin Injectable 40 milliGRAM(s) SubCutaneous every 24 hours  ferrous    sulfate 325 milliGRAM(s) Oral daily  fluticasone propionate 50 MICROgram(s)/spray Nasal Spray 1 Spray(s) Both Nostrils two times a day  folic acid 1 milliGRAM(s) Oral daily  levETIRAcetam  IVPB 1000 milliGRAM(s) IV Intermittent every 12 hours  multivitamin 1 Tablet(s) Oral daily  OLANZapine 7.5 milliGRAM(s) Oral daily  pantoprazole    Tablet 40 milliGRAM(s) Oral before breakfast  phenytoin   Capsule 100 milliGRAM(s) Oral two times a day    MEDICATIONS  (PRN):  ALBUTerol/ipratropium for Nebulization 3 milliLiter(s) Nebulizer every 6 hours PRN Shortness of Breath and/or Wheezing  LORazepam     Tablet 1 milliGRAM(s) Oral every 6 hours PRN Agitation  LORazepam   Injectable 1 milliGRAM(s) IV Push every 6 hours PRN Agitation  LORazepam   Injectable 2 milliGRAM(s) IV Push once PRN Prior to liver US.              PHYSICAL EXAM:  GENERAL: NAD, well-developed  HEAD:  Atraumatic, Normocephalic  EYES: EOMI, PERRLA, conjunctiva and sclera anicteric  NECK: Supple, No JVD  CHEST/LUNG: Clear to auscultation bilaterally; No wheeze  HEART: Regular rate and rhythm; No murmurs, rubs, or gallops  ABDOMEN: Soft, Nontender, Nondistended; Bowel sounds present, no hepatosplenomegaly, no rebound or guarding  EXTREMITIES:  2+ Peripheral Pulses, No clubbing, cyanosis, or edema  neuro:nonverbal  SKIN: No rashes or lesion    LABS:                        13.4   5.38  )-----------( 219      ( 03 Mar 2019 06:45 )             45.8     03-03    151<H>  |  111<H>  |  13  ----------------------------<  82  4.3   |  25  |  0.68    Ca    9.2      03 Mar 2019 06:45  Phos  2.7     03-02  Mg     2.0     03-02    TPro  7.3  /  Alb  3.4  /  TBili  0.5  /  DBili  x   /  AST  240<H>  /  ALT  402<H>  /  AlkPhos  240<H>  03-03    LIVER FUNCTIONS - ( 03 Mar 2019 06:45 )  Alb: 3.4 g/dL / Pro: 7.3 g/dL / ALK PHOS: 240 u/L / ALT: 402 u/L / AST: 240 u/L / GGT: x           PT/INR - ( 03 Mar 2019 06:45 )   PT: 11.3 SEC;   INR: 1.02                    RADIOLOGY & ADDITIONAL TESTS:

## 2019-03-03 NOTE — CHART NOTE - NSCHARTNOTEFT_GEN_A_CORE
Reviewed BMP - Na -145. IV fluid discontinued. Will repeat BMP in Am. Reviewed BMP - Na -145. Na corrected with D5W.  IV fluid discontinued. Will repeat BMP in Am.

## 2019-03-03 NOTE — PROGRESS NOTE ADULT - SUBJECTIVE AND OBJECTIVE BOX
Patient is a 53y old  Female who presents with a chief complaint of agitation (03 Mar 2019 09:57)        SUBJECTIVE / OVERNIGHT EVENTS:      MEDICATIONS  (STANDING):  artificial  tears Solution 1 Drop(s) Both EYES two times a day  aspirin  chewable 81 milliGRAM(s) Oral daily  buDESOnide   0.5 milliGRAM(s) Respule 0.5 milliGRAM(s) Inhalation daily  dextrose 5%. 1000 milliLiter(s) (60 mL/Hr) IV Continuous <Continuous>  docusate sodium 100 milliGRAM(s) Oral three times a day  enoxaparin Injectable 40 milliGRAM(s) SubCutaneous every 24 hours  ferrous    sulfate 325 milliGRAM(s) Oral daily  fluticasone propionate 50 MICROgram(s)/spray Nasal Spray 1 Spray(s) Both Nostrils two times a day  folic acid 1 milliGRAM(s) Oral daily  levETIRAcetam  IVPB 1000 milliGRAM(s) IV Intermittent every 12 hours  multivitamin 1 Tablet(s) Oral daily  OLANZapine 7.5 milliGRAM(s) Oral daily  pantoprazole    Tablet 40 milliGRAM(s) Oral before breakfast  phenytoin   Capsule 100 milliGRAM(s) Oral two times a day    MEDICATIONS  (PRN):  ALBUTerol/ipratropium for Nebulization 3 milliLiter(s) Nebulizer every 6 hours PRN Shortness of Breath and/or Wheezing  LORazepam     Tablet 1 milliGRAM(s) Oral every 6 hours PRN Agitation  LORazepam   Injectable 1 milliGRAM(s) IV Push every 6 hours PRN Agitation  LORazepam   Injectable 2 milliGRAM(s) IV Push once PRN Prior to liver US.        CAPILLARY BLOOD GLUCOSE        I&O's Summary      PHYSICAL EXAM  GENERAL: NAD, well-developed  HEAD:  Atraumatic, Normocephalic  EYES: EOMI, PERRLA, conjunctiva and sclera clear  NECK: Supple, No JVD  CHEST/LUNG: Clear to auscultation bilaterally; No wheeze  HEART: Regular rate and rhythm; No murmurs, rubs, or gallops  ABDOMEN: Soft, Nontender, Nondistended; Bowel sounds present  EXTREMITIES:  2+ Peripheral Pulses, No clubbing, cyanosis, or edema  PSYCH: AAOx3  SKIN: No rashes or lesions    LABS:                        13.4   5.38  )-----------( 219      ( 03 Mar 2019 06:45 )             45.8     03-03    151<H>  |  111<H>  |  13  ----------------------------<  82  4.3   |  25  |  0.68    Ca    9.2      03 Mar 2019 06:45  Phos  2.7     03-02  Mg     2.0     03-02    TPro  7.3  /  Alb  3.4  /  TBili  0.5  /  DBili  x   /  AST  240<H>  /  ALT  402<H>  /  AlkPhos  240<H>  03-03    PT/INR - ( 03 Mar 2019 06:45 )   PT: 11.3 SEC;   INR: 1.02                    RADIOLOGY & ADDITIONAL TESTS:    Imaging Personally Reviewed:  Consultant(s) Notes Reviewed:  GI  Care Discussed with Consultants/Other Providers:

## 2019-03-03 NOTE — PROGRESS NOTE ADULT - PROBLEM SELECTOR PLAN 2
- Hep panel negative, CBD dilatation on CT chest but no abdominal tenderness   - Abdominal US-pending, patient unable to tolerate test. Will repeat with pre-medication.  - avoid hepatotoxic medications   - trend LFTs   - autoimmune labs, PCR pending  - Patient unable to tolerate MRCP w/o anesthesia. D/W mother and sister, would not want to proceed with MRCP with sedation at this time.   - appreciate hep recs - possible EUS if no other modality. Will need to discuss with family as reluctant to under go anaesthesia.

## 2019-03-03 NOTE — PROGRESS NOTE ADULT - PROBLEM SELECTOR PLAN 1
Acute encephalopathy, possibly metabolic   - RVP and Urine cultures negative   - CXR w/ retrocardiac opacity, but CT chest with no consolidation or effusion   - elevated LFT's and CBD dilatation on CT, f/u RUQ sono   - monitoring off antibiotics for now  - Will check ammonia levels.

## 2019-03-04 LAB
ALBUMIN SERPL ELPH-MCNC: 3.1 G/DL — LOW (ref 3.3–5)
ALP SERPL-CCNC: 192 U/L — HIGH (ref 40–120)
ALT FLD-CCNC: 295 U/L — HIGH (ref 4–33)
AMMONIA BLD-MCNC: 36 UMOL/L — SIGNIFICANT CHANGE UP (ref 11–55)
ANION GAP SERPL CALC-SCNC: 13 MMO/L — SIGNIFICANT CHANGE UP (ref 7–14)
ANION GAP SERPL CALC-SCNC: 13 MMO/L — SIGNIFICANT CHANGE UP (ref 7–14)
AST SERPL-CCNC: 143 U/L — HIGH (ref 4–32)
BILIRUB SERPL-MCNC: 0.4 MG/DL — SIGNIFICANT CHANGE UP (ref 0.2–1.2)
BUN SERPL-MCNC: 11 MG/DL — SIGNIFICANT CHANGE UP (ref 7–23)
BUN SERPL-MCNC: 11 MG/DL — SIGNIFICANT CHANGE UP (ref 7–23)
CALCIUM SERPL-MCNC: 8.5 MG/DL — SIGNIFICANT CHANGE UP (ref 8.4–10.5)
CALCIUM SERPL-MCNC: 8.7 MG/DL — SIGNIFICANT CHANGE UP (ref 8.4–10.5)
CHLORIDE SERPL-SCNC: 105 MMOL/L — SIGNIFICANT CHANGE UP (ref 98–107)
CHLORIDE SERPL-SCNC: 106 MMOL/L — SIGNIFICANT CHANGE UP (ref 98–107)
CMV DNA CSF QL NAA+PROBE: NOT DETECTED — SIGNIFICANT CHANGE UP
CMV DNA SPEC NAA+PROBE-LOG#: SIGNIFICANT CHANGE UP LOGIU/ML
CO2 SERPL-SCNC: 24 MMOL/L — SIGNIFICANT CHANGE UP (ref 22–31)
CO2 SERPL-SCNC: 26 MMOL/L — SIGNIFICANT CHANGE UP (ref 22–31)
CREAT SERPL-MCNC: 0.52 MG/DL — SIGNIFICANT CHANGE UP (ref 0.5–1.3)
CREAT SERPL-MCNC: 0.52 MG/DL — SIGNIFICANT CHANGE UP (ref 0.5–1.3)
EBV EA AB TITR SER IF: NEGATIVE — SIGNIFICANT CHANGE UP
EBV EA IGG SER-ACNC: NEGATIVE — SIGNIFICANT CHANGE UP
EBV PATRN SPEC IB-IMP: SIGNIFICANT CHANGE UP
EBV VCA IGG AVIDITY SER QL IA: POSITIVE — SIGNIFICANT CHANGE UP
EBV VCA IGM TITR FLD: NEGATIVE — SIGNIFICANT CHANGE UP
GLUCOSE SERPL-MCNC: 71 MG/DL — SIGNIFICANT CHANGE UP (ref 70–99)
GLUCOSE SERPL-MCNC: 83 MG/DL — SIGNIFICANT CHANGE UP (ref 70–99)
HCT VFR BLD CALC: 41.3 % — SIGNIFICANT CHANGE UP (ref 34.5–45)
HGB BLD-MCNC: 12.7 G/DL — SIGNIFICANT CHANGE UP (ref 11.5–15.5)
LKM AB SER-ACNC: < 20.1 UNITS — SIGNIFICANT CHANGE UP (ref 0–20)
MCHC RBC-ENTMCNC: 30.8 % — LOW (ref 32–36)
MCHC RBC-ENTMCNC: 31.4 PG — SIGNIFICANT CHANGE UP (ref 27–34)
MCV RBC AUTO: 102.2 FL — HIGH (ref 80–100)
NRBC # FLD: 0 K/UL — LOW (ref 25–125)
PLATELET # BLD AUTO: 318 K/UL — SIGNIFICANT CHANGE UP (ref 150–400)
PMV BLD: 10.3 FL — SIGNIFICANT CHANGE UP (ref 7–13)
POTASSIUM SERPL-MCNC: 3.8 MMOL/L — SIGNIFICANT CHANGE UP (ref 3.5–5.3)
POTASSIUM SERPL-MCNC: 4.2 MMOL/L — SIGNIFICANT CHANGE UP (ref 3.5–5.3)
POTASSIUM SERPL-SCNC: 3.8 MMOL/L — SIGNIFICANT CHANGE UP (ref 3.5–5.3)
POTASSIUM SERPL-SCNC: 4.2 MMOL/L — SIGNIFICANT CHANGE UP (ref 3.5–5.3)
PROT SERPL-MCNC: 6.8 G/DL — SIGNIFICANT CHANGE UP (ref 6–8.3)
RBC # BLD: 4.04 M/UL — SIGNIFICANT CHANGE UP (ref 3.8–5.2)
RBC # FLD: 12.9 % — SIGNIFICANT CHANGE UP (ref 10.3–14.5)
SODIUM SERPL-SCNC: 143 MMOL/L — SIGNIFICANT CHANGE UP (ref 135–145)
SODIUM SERPL-SCNC: 144 MMOL/L — SIGNIFICANT CHANGE UP (ref 135–145)
WBC # BLD: 6.2 K/UL — SIGNIFICANT CHANGE UP (ref 3.8–10.5)
WBC # FLD AUTO: 6.2 K/UL — SIGNIFICANT CHANGE UP (ref 3.8–10.5)

## 2019-03-04 PROCEDURE — 76705 ECHO EXAM OF ABDOMEN: CPT | Mod: 26

## 2019-03-04 PROCEDURE — 43259 EGD US EXAM DUODENUM/JEJUNUM: CPT | Mod: GC

## 2019-03-04 PROCEDURE — 99233 SBSQ HOSP IP/OBS HIGH 50: CPT

## 2019-03-04 PROCEDURE — 43239 EGD BIOPSY SINGLE/MULTIPLE: CPT | Mod: 59,GC

## 2019-03-04 RX ADMIN — Medication 0.5 MILLIGRAM(S): at 09:48

## 2019-03-04 RX ADMIN — Medication 104 MILLIGRAM(S): at 20:53

## 2019-03-04 RX ADMIN — Medication 1 SPRAY(S): at 17:14

## 2019-03-04 RX ADMIN — Medication 1 MILLIGRAM(S): at 05:44

## 2019-03-04 RX ADMIN — Medication 1 DROP(S): at 05:21

## 2019-03-04 RX ADMIN — LEVETIRACETAM 400 MILLIGRAM(S): 250 TABLET, FILM COATED ORAL at 06:20

## 2019-03-04 RX ADMIN — Medication 104 MILLIGRAM(S): at 05:21

## 2019-03-04 RX ADMIN — LEVETIRACETAM 400 MILLIGRAM(S): 250 TABLET, FILM COATED ORAL at 20:53

## 2019-03-04 RX ADMIN — Medication 2 MILLIGRAM(S): at 11:40

## 2019-03-04 RX ADMIN — Medication 1 DROP(S): at 17:14

## 2019-03-04 RX ADMIN — Medication 1 SPRAY(S): at 05:21

## 2019-03-04 NOTE — PROGRESS NOTE ADULT - PROBLEM SELECTOR PLAN 1
Acute encephalopathy, possibly metabolic   - RVP and Urine cultures negative   - CXR w/ retrocardiac opacity, but CT chest with no consolidation or effusion   - elevated LFT's and CBD dilatation on CT; RUQ sono with e/o gallstones  - monitoring off antibiotics for now

## 2019-03-04 NOTE — PROGRESS NOTE BEHAVIORAL HEALTH - CASE SUMMARY
52 yo F with cerebral palsy, intellectual disability (nonverbal, does not ambulate), PMH seizure disorder, patient in usual state on exam, nonverbal, does not follow commands recoils when touched on hand, tracks/makes eye contact. Agitation likely 2/2 medical illness, artifact being in new environment. C/w PRNs as above, LFTs unlikely to be 2/2 zyprexa now downtrending despite no change in zyprexa dose, long term standing home med.

## 2019-03-04 NOTE — PROGRESS NOTE BEHAVIORAL HEALTH - OTHER
unable to assess non-verbal Non verbal unable to assess, non verbal at baseline unable to participate 2/2 disability non-verbal at baseline

## 2019-03-04 NOTE — PROGRESS NOTE ADULT - SUBJECTIVE AND OBJECTIVE BOX
CHIEF COMPLAINT: Patient is a 53y old  female who presents with a chief complaint of encephalopathy (03 Mar 2019 14:21)    SUBJECTIVE / OVERNIGHT EVENTS: Patient seen and examined. Appears slightly uncomfortable. Non-verbal.     MEDICATIONS  (STANDING):  artificial  tears Solution 1 Drop(s) Both EYES two times a day  aspirin  chewable 81 milliGRAM(s) Oral daily  buDESOnide   0.5 milliGRAM(s) Respule 0.5 milliGRAM(s) Inhalation daily  docusate sodium 100 milliGRAM(s) Oral three times a day  enoxaparin Injectable 40 milliGRAM(s) SubCutaneous every 24 hours  ferrous    sulfate 325 milliGRAM(s) Oral daily  fluticasone propionate 50 MICROgram(s)/spray Nasal Spray 1 Spray(s) Both Nostrils two times a day  folic acid 1 milliGRAM(s) Oral daily  levETIRAcetam  IVPB 1000 milliGRAM(s) IV Intermittent every 12 hours  multivitamin 1 Tablet(s) Oral daily  OLANZapine 7.5 milliGRAM(s) Oral daily  pantoprazole    Tablet 40 milliGRAM(s) Oral before breakfast  phenytoin  IVPB 100 milliGRAM(s) IV Intermittent two times a day    MEDICATIONS  (PRN):  ALBUTerol/ipratropium for Nebulization 3 milliLiter(s) Nebulizer every 6 hours PRN Shortness of Breath and/or Wheezing  LORazepam     Tablet 1 milliGRAM(s) Oral every 6 hours PRN Agitation  LORazepam   Injectable 1 milliGRAM(s) IV Push every 6 hours PRN Agitation      VITALS:  T(F): 98.2 (03-04-19 @ 05:17), Max: 98.2 (03-04-19 @ 05:17)  HR: 91 (03-04-19 @ 05:17) (81 - 91)  BP: 124/70 (03-04-19 @ 05:17) (124/70 - 144/83)  RR: 18 (03-04-19 @ 05:17) (17 - 18)  SpO2: 94% (03-04-19 @ 05:17)      PHYSICAL EXAM:  GENERAL: well-developed  CHEST/LUNG: Clear to auscultation bilaterally; No wheeze  HEART: Regular rate and rhythm; No murmurs, rubs, or gallops  ABDOMEN: tender to palpation in the RUQ; patient moans if palpated in that area; Bowel sounds present  EXTREMITIES:  2+ Peripheral Pulses, No clubbing, cyanosis, or edema    LABS:              12.7                 144  | 26   | 11           6.20  >-----------< 318     ------------------------< 83                    41.3                 3.8  | 105  | 0.52                                         Ca 8.5   Mg x     Ph x           TPro  6.8  /  Alb  3.1      TBili  0.4  /  DBili  x         AST  143  /  ALT  295            AlkPhos  192      INR: 1.02 ;    PT: 11.3 SEC;    PTT: x        LIVER FUNCTIONS - ( 04 Mar 2019 06:20 )  Alb: 3.1 g/dL / Pro: 6.8 g/dL / ALK PHOS: 192 u/L / ALT: 295 u/L / AST: 143 u/L / GGT: x           RADIOLOGY & ADDITIONAL TESTS:  Imaging Personally Reviewed:  < from: US Abdomen Limited (03.04.19 @ 12:56) >  IMPRESSION:     Gallstones.  Normal biliary tree.    < end of copied text >    [x] Consultant(s) Notes Reviewed: GI   [x] Care Discussed with Consultants/Other Providers: ADS NP - plan for EUS today

## 2019-03-04 NOTE — PROGRESS NOTE ADULT - PROBLEM SELECTOR PLAN 2
- Hep panel negative, CBD dilatation on CT chest now with RUQ tenderness  - Abdominal US performed with e/o gallstones   - avoid hepatotoxic medications   - trend LFTs   - autoimmune labs, PCR pending  - Patient unable to tolerate MRCP w/o anesthesia. D/W mother and sister, would not want to proceed with MRCP with sedation at this time.   - appreciate hep recs - plan for EUS later today; c/w NPO

## 2019-03-04 NOTE — PROGRESS NOTE BEHAVIORAL HEALTH - SUMMARY
54 yo F with cerebral palsy (nonverbal, does not ambulate), PMH seizure disorder, PPH agitation on Zyprexa, brought in from group home for AMS characterized by 1 week of increased agitation, aggression, scratching, and not taking medications. Upon admission, patient has been intermittently agitated, making workup difficult. CT chest found CBD dilitation, but further workup with US was not done as patient could not comply with test 2/2 agitation. Since admission, patient's LFTs have greatly increased despite no change in her medication regimen. Though she has been uncooperative with diagnostic examinations, she otherwise appears to be at her baseline behaviorally.    Though she has tolerated Zyprexa PRN in the past, it would be reasonable to hold the PRN dose in favor of Ativan given current hepatic concerns, with Ativan being safer until LFTs return to normal.     PLAN:  1. continue Zyprexa 7.5 mg PO qhs   2. PRNs: Ativan 1 mg q6 PO/IM/IV PRN agitation, with option to give additional 1 mg if first one is tolerated and second needed (patient has chronic hypercapnia)  **would d/c Zyprexa PRN to avoid additional medications affecting the liver  3. case discussed with team, will continue to follow      ***DO NOT GIVE IM Zyprexa within 4hrs of IM/IV Ativan due to risk of respiratory depression and sedation.

## 2019-03-04 NOTE — CHART NOTE - NSCHARTNOTEFT_GEN_A_CORE
Na corrected.  Patient on add-on schedule for EUS today (unclear time - in the afternoon at some point)  Please keep NPO.

## 2019-03-05 DIAGNOSIS — K80.20 CALCULUS OF GALLBLADDER WITHOUT CHOLECYSTITIS WITHOUT OBSTRUCTION: ICD-10-CM

## 2019-03-05 DIAGNOSIS — R74.0 NONSPECIFIC ELEVATION OF LEVELS OF TRANSAMINASE AND LACTIC ACID DEHYDROGENASE [LDH]: ICD-10-CM

## 2019-03-05 DIAGNOSIS — K81.0 ACUTE CHOLECYSTITIS: ICD-10-CM

## 2019-03-05 LAB
ALBUMIN SERPL ELPH-MCNC: 3 G/DL — LOW (ref 3.3–5)
ALP SERPL-CCNC: 163 U/L — HIGH (ref 40–120)
ALT FLD-CCNC: 209 U/L — HIGH (ref 4–33)
ANION GAP SERPL CALC-SCNC: 15 MMO/L — HIGH (ref 7–14)
APTT BLD: 32 SEC — SIGNIFICANT CHANGE UP (ref 27.5–36.3)
AST SERPL-CCNC: 78 U/L — HIGH (ref 4–32)
BILIRUB SERPL-MCNC: 0.4 MG/DL — SIGNIFICANT CHANGE UP (ref 0.2–1.2)
BLD GP AB SCN SERPL QL: NEGATIVE — SIGNIFICANT CHANGE UP
BUN SERPL-MCNC: 10 MG/DL — SIGNIFICANT CHANGE UP (ref 7–23)
CALCIUM SERPL-MCNC: 8.6 MG/DL — SIGNIFICANT CHANGE UP (ref 8.4–10.5)
CHLORIDE SERPL-SCNC: 104 MMOL/L — SIGNIFICANT CHANGE UP (ref 98–107)
CO2 SERPL-SCNC: 23 MMOL/L — SIGNIFICANT CHANGE UP (ref 22–31)
CREAT SERPL-MCNC: 0.52 MG/DL — SIGNIFICANT CHANGE UP (ref 0.5–1.3)
GLUCOSE SERPL-MCNC: 63 MG/DL — LOW (ref 70–99)
HCT VFR BLD CALC: 42.6 % — SIGNIFICANT CHANGE UP (ref 34.5–45)
HGB BLD-MCNC: 12.9 G/DL — SIGNIFICANT CHANGE UP (ref 11.5–15.5)
INR BLD: 1.07 — SIGNIFICANT CHANGE UP (ref 0.88–1.17)
MAGNESIUM SERPL-MCNC: 1.8 MG/DL — SIGNIFICANT CHANGE UP (ref 1.6–2.6)
MCHC RBC-ENTMCNC: 30.3 % — LOW (ref 32–36)
MCHC RBC-ENTMCNC: 31.3 PG — SIGNIFICANT CHANGE UP (ref 27–34)
MCV RBC AUTO: 103.4 FL — HIGH (ref 80–100)
MITOCHONDRIA AB SER-ACNC: SIGNIFICANT CHANGE UP
NRBC # FLD: 0 K/UL — LOW (ref 25–125)
PHOSPHATE SERPL-MCNC: 2.9 MG/DL — SIGNIFICANT CHANGE UP (ref 2.5–4.5)
PLATELET # BLD AUTO: 270 K/UL — SIGNIFICANT CHANGE UP (ref 150–400)
PMV BLD: 10.1 FL — SIGNIFICANT CHANGE UP (ref 7–13)
POTASSIUM SERPL-MCNC: 3.9 MMOL/L — SIGNIFICANT CHANGE UP (ref 3.5–5.3)
POTASSIUM SERPL-SCNC: 3.9 MMOL/L — SIGNIFICANT CHANGE UP (ref 3.5–5.3)
PROT SERPL-MCNC: 6.5 G/DL — SIGNIFICANT CHANGE UP (ref 6–8.3)
PROTHROM AB SERPL-ACNC: 12.2 SEC — SIGNIFICANT CHANGE UP (ref 9.8–13.1)
RBC # BLD: 4.12 M/UL — SIGNIFICANT CHANGE UP (ref 3.8–5.2)
RBC # FLD: 12.9 % — SIGNIFICANT CHANGE UP (ref 10.3–14.5)
RH IG SCN BLD-IMP: NEGATIVE — SIGNIFICANT CHANGE UP
SODIUM SERPL-SCNC: 142 MMOL/L — SIGNIFICANT CHANGE UP (ref 135–145)
WBC # BLD: 7.02 K/UL — SIGNIFICANT CHANGE UP (ref 3.8–10.5)
WBC # FLD AUTO: 7.02 K/UL — SIGNIFICANT CHANGE UP (ref 3.8–10.5)

## 2019-03-05 PROCEDURE — 71045 X-RAY EXAM CHEST 1 VIEW: CPT | Mod: 26

## 2019-03-05 PROCEDURE — 99222 1ST HOSP IP/OBS MODERATE 55: CPT

## 2019-03-05 PROCEDURE — 93010 ELECTROCARDIOGRAM REPORT: CPT

## 2019-03-05 PROCEDURE — 99233 SBSQ HOSP IP/OBS HIGH 50: CPT

## 2019-03-05 PROCEDURE — 99232 SBSQ HOSP IP/OBS MODERATE 35: CPT | Mod: GC

## 2019-03-05 PROCEDURE — 99221 1ST HOSP IP/OBS SF/LOW 40: CPT

## 2019-03-05 RX ORDER — PIPERACILLIN AND TAZOBACTAM 4; .5 G/20ML; G/20ML
3.38 INJECTION, POWDER, LYOPHILIZED, FOR SOLUTION INTRAVENOUS EVERY 8 HOURS
Refills: 0 | Status: DISCONTINUED | OUTPATIENT
Start: 2019-03-05 | End: 2019-03-11

## 2019-03-05 RX ADMIN — OLANZAPINE 7.5 MILLIGRAM(S): 15 TABLET, FILM COATED ORAL at 12:01

## 2019-03-05 RX ADMIN — LEVETIRACETAM 400 MILLIGRAM(S): 250 TABLET, FILM COATED ORAL at 06:13

## 2019-03-05 RX ADMIN — PIPERACILLIN AND TAZOBACTAM 25 GRAM(S): 4; .5 INJECTION, POWDER, LYOPHILIZED, FOR SOLUTION INTRAVENOUS at 18:22

## 2019-03-05 RX ADMIN — Medication 1 DROP(S): at 06:13

## 2019-03-05 RX ADMIN — Medication 1 MILLIGRAM(S): at 06:09

## 2019-03-05 RX ADMIN — Medication 1 DROP(S): at 18:22

## 2019-03-05 RX ADMIN — Medication 0.5 MILLIGRAM(S): at 08:33

## 2019-03-05 RX ADMIN — PIPERACILLIN AND TAZOBACTAM 25 GRAM(S): 4; .5 INJECTION, POWDER, LYOPHILIZED, FOR SOLUTION INTRAVENOUS at 09:10

## 2019-03-05 RX ADMIN — Medication 1 TABLET(S): at 12:02

## 2019-03-05 RX ADMIN — LEVETIRACETAM 400 MILLIGRAM(S): 250 TABLET, FILM COATED ORAL at 18:18

## 2019-03-05 RX ADMIN — Medication 104 MILLIGRAM(S): at 16:54

## 2019-03-05 RX ADMIN — Medication 325 MILLIGRAM(S): at 12:02

## 2019-03-05 RX ADMIN — Medication 81 MILLIGRAM(S): at 12:02

## 2019-03-05 RX ADMIN — Medication 1 MILLIGRAM(S): at 12:02

## 2019-03-05 RX ADMIN — Medication 1 SPRAY(S): at 06:13

## 2019-03-05 RX ADMIN — Medication 104 MILLIGRAM(S): at 06:13

## 2019-03-05 NOTE — CONSULT NOTE ADULT - ASSESSMENT
53F with medical comorbidities with H/O RUQ pain (?) transaminitis and s/p EUS with finding of only gallstones and ? thick walled GB.  Transaminases this morning significantly improved from admission and pt does not appear in any distress.     Consider HIDA scan under sedation for mor definitive dx of acute cholecystitis.  Pending results consider percutaneous cholecystostomy vs laparoscopic cholecystectomy if warranted    DW Dr Kashif Anderson

## 2019-03-05 NOTE — PROGRESS NOTE ADULT - PROBLEM SELECTOR PLAN 1
-s/p EUS yesterday, normal bile duct without choledocolithiasis. Cholelithiasis and thickened gallbladder wall - could have mild acute cholecystitis  -start Zosyn IV  -consulted surgery for possible lap choley; f/u surgery recs  -NPO  -CXR clear and EKG reviewed; INR WNL.  -patient meets 1 RCRI risk factor (intraperitoneal surgery). Risk of major cardiac event is 6.0% based of the revised cardiac risk index. Patient is at low risk for this high risk surgery with no further cardiac testing needed.   -unable to address mallampati score given h/o cerebral palsy and patient being contracted  - ?class II or III. Would notify anesthesia for respiratory precautions.

## 2019-03-05 NOTE — SWALLOW BEDSIDE ASSESSMENT ADULT - COMMENTS
Patient was seen at bedside and was found alert. Patient became physically combative when food was presented and would turn head away from all trials attempted. At this time, oral intake is contraindicated and non-oral means of nutrition/hydration is recommended as patient is at high risk for dehydration and malnutrition d/t refusal. Patient was seen at bedside and was found alert. Patient became physically combative when food was presented and would turn head away from all trials attempted. At this time, oral intake is contraindicated and non-oral means of nutrition/hydration is recommended as patient is at high risk for dehydration and malnutrition d/t refusal.    Patient is known to this department and had previous bedside evaluations during previous hospitalizations.  Most Previous MBS performed on 10/29/2018 during hospitalization with recommendation of puree and honey thickened liquids.

## 2019-03-05 NOTE — PROGRESS NOTE ADULT - SUBJECTIVE AND OBJECTIVE BOX
CHIEF COMPLAINT: Patient is a 53y old  female who presents with a chief complaint of CP encephalopathy, transaminitis (05 Mar 2019 10:01)    SUBJECTIVE / OVERNIGHT EVENTS: Patient seen and examined. No acute events overnight. Pain well controlled and patient without any complaints.    MEDICATIONS  (STANDING):  artificial  tears Solution 1 Drop(s) Both EYES two times a day  aspirin  chewable 81 milliGRAM(s) Oral daily  buDESOnide   0.5 milliGRAM(s) Respule 0.5 milliGRAM(s) Inhalation daily  docusate sodium 100 milliGRAM(s) Oral three times a day  enoxaparin Injectable 40 milliGRAM(s) SubCutaneous every 24 hours  ferrous    sulfate 325 milliGRAM(s) Oral daily  fluticasone propionate 50 MICROgram(s)/spray Nasal Spray 1 Spray(s) Both Nostrils two times a day  folic acid 1 milliGRAM(s) Oral daily  levETIRAcetam  IVPB 1000 milliGRAM(s) IV Intermittent every 12 hours  multivitamin 1 Tablet(s) Oral daily  OLANZapine 7.5 milliGRAM(s) Oral daily  pantoprazole    Tablet 40 milliGRAM(s) Oral before breakfast  phenytoin  IVPB 100 milliGRAM(s) IV Intermittent two times a day  piperacillin/tazobactam IVPB. 3.375 Gram(s) IV Intermittent every 8 hours    MEDICATIONS  (PRN):  ALBUTerol/ipratropium for Nebulization 3 milliLiter(s) Nebulizer every 6 hours PRN Shortness of Breath and/or Wheezing  LORazepam     Tablet 1 milliGRAM(s) Oral every 6 hours PRN Agitation  LORazepam   Injectable 1 milliGRAM(s) IV Push every 6 hours PRN Agitation      VITALS:  T(F): 97.4 (03-05-19 @ 06:50), Max: 98 (03-04-19 @ 15:05)  HR: 84 (03-05-19 @ 06:50) (84 - 98)  BP: 136/89 (03-05-19 @ 06:50) (119/80 - 136/89)  RR: 17 (03-05-19 @ 06:50) (17 - 18)  SpO2: 95% (03-05-19 @ 06:50)      PHYSICAL EXAM:  GENERAL: well-developed  CHEST/LUNG: Clear to auscultation bilaterally; No wheeze  HEART: Regular rate and rhythm; No murmurs, rubs, or gallops  ABDOMEN: tender to palpation in the RUQ; patient moans if palpated in that area; Bowel sounds present  EXTREMITIES:  2+ Peripheral Pulses, No clubbing, cyanosis, or edema    LABS:              12.9                 142  | 23   | 10           7.02  >-----------< 270     ------------------------< 63                    42.6                 3.9  | 104  | 0.52                                         Ca 8.6   Mg 1.8   Ph 2.9         TPro  6.5  /  Alb  3.0      TBili  0.4  /  DBili  x         AST  78  /  ALT  209            AlkPhos  163      INR: 1.07 ;    PT: 12.2 SEC;    PTT: 32.0 SEC    MICROBIOLOGY:  CMV by PCR negative    RADIOLOGY & ADDITIONAL TESTS:  Imaging Personally Reviewed:  < from: Xray Chest 1 View- PORTABLE-Urgent (03.05.19 @ 10:31) >  MPRESSION:  Very limited image. Lungs not seen in near entirety.    The portion of the right lung which can be visualized is clear and a   small portion of the left upper lung is clear as well.    < end of copied text >    EKG - NSR with HR 86    [ ] Consultant(s) Notes Reviewed:  [x] Care Discussed with Consultants/Other Providers: Surgery PA - discussed possible lap choley.

## 2019-03-05 NOTE — PROGRESS NOTE ADULT - SUBJECTIVE AND OBJECTIVE BOX
Chief Complaint:  Patient is a 53y old  Female who presents with a chief complaint of encephalopathy (03 Mar 2019 14:21)      Interval Events: Pt is s/p EUS yesterday.  Essentially normal exam, only found gallstones.    Allergies:  Topamax (Unknown)      Hospital Medications:  ALBUTerol/ipratropium for Nebulization 3 milliLiter(s) Nebulizer every 6 hours PRN  artificial  tears Solution 1 Drop(s) Both EYES two times a day  aspirin  chewable 81 milliGRAM(s) Oral daily  buDESOnide   0.5 milliGRAM(s) Respule 0.5 milliGRAM(s) Inhalation daily  docusate sodium 100 milliGRAM(s) Oral three times a day  enoxaparin Injectable 40 milliGRAM(s) SubCutaneous every 24 hours  ferrous    sulfate 325 milliGRAM(s) Oral daily  fluticasone propionate 50 MICROgram(s)/spray Nasal Spray 1 Spray(s) Both Nostrils two times a day  folic acid 1 milliGRAM(s) Oral daily  levETIRAcetam  IVPB 1000 milliGRAM(s) IV Intermittent every 12 hours  LORazepam     Tablet 1 milliGRAM(s) Oral every 6 hours PRN  LORazepam   Injectable 1 milliGRAM(s) IV Push every 6 hours PRN  multivitamin 1 Tablet(s) Oral daily  OLANZapine 7.5 milliGRAM(s) Oral daily  pantoprazole    Tablet 40 milliGRAM(s) Oral before breakfast  phenytoin  IVPB 100 milliGRAM(s) IV Intermittent two times a day      PMHX/PSHX:  Intellectual disability  Constipation  Seizure disorder  Cerebral palsy  No significant past surgical history      Family history:  No pertinent family history in first degree relatives  No pertinent family history in first degree relatives      ROS: unable to obtain      PHYSICAL EXAM:     GENERAL:  Appears stated age, well-groomed, well-nourished, no distress  HEENT:  NC/AT,  conjunctivae clear, sclera -anicteric  CHEST:  Full & symmetric excursion, no increased effort,   ABDOMEN:  Soft, non-tender, non-distended, normoactive bowel sounds  EXTREMITIES:  no cyanosis,clubbing or edema  SKIN:  No rash  NEURO:  Alert    Vital Signs:  Vital Signs Last 24 Hrs  T(C): 36.3 (05 Mar 2019 06:50), Max: 36.7 (04 Mar 2019 15:05)  T(F): 97.4 (05 Mar 2019 06:50), Max: 98 (04 Mar 2019 15:05)  HR: 84 (05 Mar 2019 06:50) (84 - 98)  BP: 136/89 (05 Mar 2019 06:50) (119/80 - 136/89)  BP(mean): --  RR: 17 (05 Mar 2019 06:50) (17 - 18)  SpO2: 95% (05 Mar 2019 06:50) (95% - 95%)  Daily     Daily     LABS:                        12.9   7.02  )-----------( 270      ( 05 Mar 2019 06:31 )             42.6     03-04    143  |  106  |  11  ----------------------------<  71  4.2   |  24  |  0.52    Ca    8.7      04 Mar 2019 14:36    TPro  6.8  /  Alb  3.1<L>  /  TBili  0.4  /  DBili  x   /  AST  143<H>  /  ALT  295<H>  /  AlkPhos  192<H>  03-04    LIVER FUNCTIONS - ( 04 Mar 2019 06:20 )  Alb: 3.1 g/dL / Pro: 6.8 g/dL / ALK PHOS: 192 u/L / ALT: 295 u/L / AST: 143 u/L / GGT: x                   Imaging:  EUS report to come    < from: US Abdomen Limited (03.04.19 @ 12:56) >    EXAM:  US ABDOMEN LIMITED        PROCEDURE DATE:  Mar  4 2019         INTERPRETATION:  CLINICAL INFORMATION: 53-year-old female with increased   LFTs    COMPARISON: None available.    TECHNIQUE: Sonography of the right upper quadrant.     FINDINGS:    Liver: Within normal limits.    Bile ducts: Normal caliber.     Gallbladder: Gallstones.    Pancreas: Visualized portions are within normal limits.    Right kidney:  8.7 cm. No hydronephrosis.    Ascites: None.    IVC: Visualized portions are within normal limits.    IMPRESSION:     Gallstones.  Normal biliary tree.                  JOSE BURNS M.D., ATTENDING RADIOLOGIST  This document has been electronically signed. Mar  4 2019  1:34PM                  < end of copied text >

## 2019-03-05 NOTE — CONSULT NOTE ADULT - ATTENDING COMMENTS
I saw and examined the patient and agree with the above note.    Acute cholecystitis:  -given lack of definitive evidence for cholecystitis and the likelihood of a HIDA not being tolerated, we would prefer to treat with antibiotics. If she develops a fever or elevated WBC, would consider percutaneous cholecystostomy.   -No need for ductal imaging currently  -Pain control via IV meds    I spent 55min reviewing data, images and documentation as well as in care coordination.    Kashif Anderson MD   Acute and Critical Care Surgery  (Cell: [phone])  Email: [email or web address]    The Acute Care Surgery (B Team) Attending Group Practice:  Dr. Eric Neri, Dr. Socrates Genao, Dr. Chelsea Hartman, Dr. Kashif Anderson    Urgent issues - spectra [id number] or [id number]  Nonurgent issues - [phone]  Patient appointments or after hours - [phone] I saw and examined the patient and agree with the above note.    Acute cholecystitis:  -given lack of definitive evidence for cholecystitis and the likelihood of a HIDA not being tolerated, we would prefer to treat with antibiotics. If she develops a fever or elevated WBC, would consider percutaneous cholecystostomy.   -No need for ductal imaging currently  -Pain control via IV meds  -Please call if any concerns or questions (cellphone # below)    I spent 55min reviewing data, images and documentation as well as in care coordination.    Kashif Anderson MD   Acute and Critical Care Surgery  (Cell: [phone])  Email: [email or web address]    The Acute Care Surgery (B Team) Attending Group Practice:  Dr. Eric Neri, Dr. Socrates Genao, Dr. Chelsea Hartman, Dr. Kashif Anderson    Urgent issues - spectra [phone] or [id number]  Nonurgent issues - [phone]  Patient appointments or after hours - [phone]

## 2019-03-05 NOTE — SWALLOW BEDSIDE ASSESSMENT ADULT - SWALLOW EVAL: DIAGNOSIS
SLP attempted to administer various puree consistencies but patient refused all trials and responded with head turns and arm swings. At this time, oral intake is contraindicated and non-oral means of nutrition/hydration is recommended as patient is at high risk for dehydration and malnutrition d/t refusal. SLP attempted to administer various puree consistencies but patient refused all trials despite encouragement and responded with head turns and arm swings. At this time, oral intake is contraindicated and non-oral means of nutrition/hydration is recommended as patient is at high risk for dehydration and malnutrition d/t refusal.

## 2019-03-05 NOTE — CONSULT NOTE ADULT - SUBJECTIVE AND OBJECTIVE BOX
Surgery consult called to to see this 53F h/o CP (non verbal, does not ambulate), intractable seizures, presenting from [facility] w/ encephalopathy, hospital course c/b transaminitis and RUQ pain.  RUQ US done this admission shows gallstones but with normal ducts.   Labs: Hepatic Function Panel in AM (03.01.19 @ 05:45)    Protein Total, Serum: 6.9 g/dL    Albumin, Serum: 3.2 g/dL    Bilirubin Total, Serum: 1.5: Delta: 0.3 on 02/26/  Delta: 0.3 on 02/26/ mg/dL    Bilirubin Direct, Serum: 0.9 mg/dL    Alkaline Phosphatase, Serum: 265 u/L    Aspartate Aminotransferase (AST/SGOT): 398 u/L    Alanine Aminotransferase (ALT/SGPT): 358 u/L    GI consult was done and pt underwent an EUS on 3/4/19: Impression: 1) Mild elevated LFTs - could be 2/2 cholecystitis vs passed stone vs intrinsic liver dz.  S/p EUS yesterday, normal bile duct without choledocolithiasis 2) Cholelithiasis and thickened gallbladder wall - could have mild acute cholecystitis    On physical exam, pt is non verbal and appears comfortable. No facial grimacing noted with deep palpation of the RUQ     Labs today:    Phosphorus Level, Serum: 2.9: SPECIMEN MILDLY HEMOLYZED mg/dL    Sodium, Serum: 142 mmol/L    Potassium, Serum: 3.9: SPECIMEN MILDLY HEMOLYZED mmol/L    Chloride, Serum: 104 mmol/L    Carbon Dioxide, Serum: 23 mmol/L    Anion Gap, Serum: 15 mmo/L    Blood Urea Nitrogen, Serum: 10 mg/dL    Creatinine, Serum: 0.52 mg/dL    Glucose, Serum: 63 mg/dL    Calcium, Total Serum: 8.6 mg/dL    Protein Total, Serum: 6.5: SPECIMEN MILDLY HEMOLYZED g/dL    Albumin, Serum: 3.0 g/dL    Bilirubin Total, Serum: 0.4 mg/dL    Alkaline Phosphatase, Serum: 163 u/L    Aspartate Aminotransferase (AST/SGOT): 78: SPECIMEN MILDLY HEMOLYZED u/L    Alanine Aminotransferase (ALT/SGPT): 209: SPECIMEN MILDLY HEMOLYZED u/L    Magnesium, Serum: 1.8 mg/dL  Complete Blood Count in AM (03.05.19 @ 06:31)    WBC Count: 7.02 K/uL

## 2019-03-05 NOTE — PROGRESS NOTE ADULT - PROBLEM SELECTOR PLAN 3
- Hep panel negative, CBD dilatation on CT chest now with RUQ tenderness could be 2/2 acute choley  - avoid hepatotoxic medications   - autoimmune labs, PCR pending  - Patient unable to tolerate MRCP w/o anesthesia. D/W mother and sister, would not want to proceed with MRCP with sedation at this time.   - trend LFTs

## 2019-03-05 NOTE — PROGRESS NOTE ADULT - PROBLEM SELECTOR PLAN 2
- RVP and Urine cultures negative   - CXR w/ retrocardiac opacity, but CT chest with no consolidation or effusion   - EUS with concern for cholecystitis  - Acute encephalopathy could be 2/2 acute cholecystitis, now on zosyn IV

## 2019-03-06 LAB
ALBUMIN SERPL ELPH-MCNC: 3.2 G/DL — LOW (ref 3.3–5)
ALP SERPL-CCNC: 159 U/L — HIGH (ref 40–120)
ALT FLD-CCNC: 160 U/L — HIGH (ref 4–33)
ANA TITR SER: NEGATIVE — SIGNIFICANT CHANGE UP
ANION GAP SERPL CALC-SCNC: 14 MMO/L — SIGNIFICANT CHANGE UP (ref 7–14)
ANION GAP SERPL CALC-SCNC: 22 MMO/L — HIGH (ref 7–14)
AST SERPL-CCNC: 54 U/L — HIGH (ref 4–32)
BASOPHILS # BLD AUTO: 0.03 K/UL — SIGNIFICANT CHANGE UP (ref 0–0.2)
BASOPHILS NFR BLD AUTO: 0.3 % — SIGNIFICANT CHANGE UP (ref 0–2)
BILIRUB SERPL-MCNC: 0.5 MG/DL — SIGNIFICANT CHANGE UP (ref 0.2–1.2)
BUN SERPL-MCNC: 6 MG/DL — LOW (ref 7–23)
BUN SERPL-MCNC: 7 MG/DL — SIGNIFICANT CHANGE UP (ref 7–23)
CALCIUM SERPL-MCNC: 8.6 MG/DL — SIGNIFICANT CHANGE UP (ref 8.4–10.5)
CALCIUM SERPL-MCNC: 8.9 MG/DL — SIGNIFICANT CHANGE UP (ref 8.4–10.5)
CHLORIDE SERPL-SCNC: 102 MMOL/L — SIGNIFICANT CHANGE UP (ref 98–107)
CHLORIDE SERPL-SCNC: 103 MMOL/L — SIGNIFICANT CHANGE UP (ref 98–107)
CO2 SERPL-SCNC: 19 MMOL/L — LOW (ref 22–31)
CO2 SERPL-SCNC: 26 MMOL/L — SIGNIFICANT CHANGE UP (ref 22–31)
CREAT SERPL-MCNC: 0.56 MG/DL — SIGNIFICANT CHANGE UP (ref 0.5–1.3)
CREAT SERPL-MCNC: 0.62 MG/DL — SIGNIFICANT CHANGE UP (ref 0.5–1.3)
EOSINOPHIL # BLD AUTO: 0.36 K/UL — SIGNIFICANT CHANGE UP (ref 0–0.5)
EOSINOPHIL NFR BLD AUTO: 4.1 % — SIGNIFICANT CHANGE UP (ref 0–6)
GLUCOSE SERPL-MCNC: 75 MG/DL — SIGNIFICANT CHANGE UP (ref 70–99)
GLUCOSE SERPL-MCNC: 80 MG/DL — SIGNIFICANT CHANGE UP (ref 70–99)
HCT VFR BLD CALC: 42.6 % — SIGNIFICANT CHANGE UP (ref 34.5–45)
HGB BLD-MCNC: 13.2 G/DL — SIGNIFICANT CHANGE UP (ref 11.5–15.5)
HSV1 AB FLD QL: SIGNIFICANT CHANGE UP TITER
HSV2 AB FLD-ACNC: SIGNIFICANT CHANGE UP TITER
IMM GRANULOCYTES NFR BLD AUTO: 0.9 % — SIGNIFICANT CHANGE UP (ref 0–1.5)
LYMPHOCYTES # BLD AUTO: 2.09 K/UL — SIGNIFICANT CHANGE UP (ref 1–3.3)
LYMPHOCYTES # BLD AUTO: 23.9 % — SIGNIFICANT CHANGE UP (ref 13–44)
MAGNESIUM SERPL-MCNC: 1.7 MG/DL — SIGNIFICANT CHANGE UP (ref 1.6–2.6)
MCHC RBC-ENTMCNC: 31 % — LOW (ref 32–36)
MCHC RBC-ENTMCNC: 31.2 PG — SIGNIFICANT CHANGE UP (ref 27–34)
MCV RBC AUTO: 100.7 FL — HIGH (ref 80–100)
MONOCYTES # BLD AUTO: 0.8 K/UL — SIGNIFICANT CHANGE UP (ref 0–0.9)
MONOCYTES NFR BLD AUTO: 9.2 % — SIGNIFICANT CHANGE UP (ref 2–14)
NEUTROPHILS # BLD AUTO: 5.37 K/UL — SIGNIFICANT CHANGE UP (ref 1.8–7.4)
NEUTROPHILS NFR BLD AUTO: 61.6 % — SIGNIFICANT CHANGE UP (ref 43–77)
NRBC # FLD: 0 K/UL — LOW (ref 25–125)
PHOSPHATE SERPL-MCNC: 2.9 MG/DL — SIGNIFICANT CHANGE UP (ref 2.5–4.5)
PLATELET # BLD AUTO: 363 K/UL — SIGNIFICANT CHANGE UP (ref 150–400)
PMV BLD: 10.3 FL — SIGNIFICANT CHANGE UP (ref 7–13)
POTASSIUM SERPL-MCNC: 3.9 MMOL/L — SIGNIFICANT CHANGE UP (ref 3.5–5.3)
POTASSIUM SERPL-MCNC: 5.6 MMOL/L — HIGH (ref 3.5–5.3)
POTASSIUM SERPL-SCNC: 3.9 MMOL/L — SIGNIFICANT CHANGE UP (ref 3.5–5.3)
POTASSIUM SERPL-SCNC: 5.6 MMOL/L — HIGH (ref 3.5–5.3)
PROT SERPL-MCNC: 7.1 G/DL — SIGNIFICANT CHANGE UP (ref 6–8.3)
RBC # BLD: 4.23 M/UL — SIGNIFICANT CHANGE UP (ref 3.8–5.2)
RBC # FLD: 12.9 % — SIGNIFICANT CHANGE UP (ref 10.3–14.5)
SODIUM SERPL-SCNC: 143 MMOL/L — SIGNIFICANT CHANGE UP (ref 135–145)
SODIUM SERPL-SCNC: 143 MMOL/L — SIGNIFICANT CHANGE UP (ref 135–145)
WBC # BLD: 8.73 K/UL — SIGNIFICANT CHANGE UP (ref 3.8–10.5)
WBC # FLD AUTO: 8.73 K/UL — SIGNIFICANT CHANGE UP (ref 3.8–10.5)

## 2019-03-06 PROCEDURE — 78227 HEPATOBIL SYST IMAGE W/DRUG: CPT | Mod: 26

## 2019-03-06 PROCEDURE — 99233 SBSQ HOSP IP/OBS HIGH 50: CPT

## 2019-03-06 RX ADMIN — LEVETIRACETAM 400 MILLIGRAM(S): 250 TABLET, FILM COATED ORAL at 17:54

## 2019-03-06 RX ADMIN — Medication 81 MILLIGRAM(S): at 17:54

## 2019-03-06 RX ADMIN — PIPERACILLIN AND TAZOBACTAM 25 GRAM(S): 4; .5 INJECTION, POWDER, LYOPHILIZED, FOR SOLUTION INTRAVENOUS at 12:10

## 2019-03-06 RX ADMIN — Medication 1 MILLIGRAM(S): at 17:54

## 2019-03-06 RX ADMIN — Medication 2 MILLIGRAM(S): at 09:36

## 2019-03-06 RX ADMIN — Medication 0.5 MILLIGRAM(S): at 09:07

## 2019-03-06 RX ADMIN — Medication 1 DROP(S): at 05:18

## 2019-03-06 RX ADMIN — PIPERACILLIN AND TAZOBACTAM 25 GRAM(S): 4; .5 INJECTION, POWDER, LYOPHILIZED, FOR SOLUTION INTRAVENOUS at 19:21

## 2019-03-06 RX ADMIN — Medication 1 SPRAY(S): at 17:56

## 2019-03-06 RX ADMIN — Medication 325 MILLIGRAM(S): at 17:54

## 2019-03-06 RX ADMIN — Medication 1 DROP(S): at 17:54

## 2019-03-06 RX ADMIN — ENOXAPARIN SODIUM 40 MILLIGRAM(S): 100 INJECTION SUBCUTANEOUS at 12:10

## 2019-03-06 RX ADMIN — Medication 104 MILLIGRAM(S): at 18:38

## 2019-03-06 RX ADMIN — Medication 104 MILLIGRAM(S): at 05:18

## 2019-03-06 RX ADMIN — OLANZAPINE 7.5 MILLIGRAM(S): 15 TABLET, FILM COATED ORAL at 17:54

## 2019-03-06 RX ADMIN — LEVETIRACETAM 400 MILLIGRAM(S): 250 TABLET, FILM COATED ORAL at 05:24

## 2019-03-06 RX ADMIN — Medication 1 SPRAY(S): at 05:18

## 2019-03-06 RX ADMIN — Medication 1 TABLET(S): at 17:54

## 2019-03-06 RX ADMIN — PIPERACILLIN AND TAZOBACTAM 25 GRAM(S): 4; .5 INJECTION, POWDER, LYOPHILIZED, FOR SOLUTION INTRAVENOUS at 05:23

## 2019-03-06 RX ADMIN — Medication 100 MILLIGRAM(S): at 22:16

## 2019-03-06 RX ADMIN — PANTOPRAZOLE SODIUM 40 MILLIGRAM(S): 20 TABLET, DELAYED RELEASE ORAL at 07:20

## 2019-03-06 NOTE — CHART NOTE - NSCHARTNOTEFT_GEN_A_CORE
HIDA scan negative, spoke with surgery resident [id number],. ok to advance to low fat diet. will monitor for tolerance.    d/w Attending and RN

## 2019-03-06 NOTE — PROGRESS NOTE ADULT - PROBLEM SELECTOR PLAN 1
-HIDA scan negative for acute choley  -c/w Zosyn IV for now  -f/u surgery recs  -continue NPO until final surgery recs  -CXR clear and EKG reviewed; INR WNL.  -patient meets 1 RCRI risk factor (intraperitoneal surgery). Risk of major cardiac event is 6.0% based of the revised cardiac risk index. Patient is at low risk for this high risk surgery with no further cardiac testing needed.   -unable to address mallampati score given h/o cerebral palsy and patient being contracted  - ?class II or III. Would notify anesthesia for respiratory precautions if patient were to go for surgery.

## 2019-03-06 NOTE — PROGRESS NOTE ADULT - SUBJECTIVE AND OBJECTIVE BOX
CHIEF COMPLAINT: Patient is a 53y old  female who presents with a chief complaint of CP encephalopathy, transaminitis (05 Mar 2019 10:01)      SUBJECTIVE / OVERNIGHT EVENTS: Patient seen and examined. No acute events overnight. Pain well controlled and patient without any complaints.    MEDICATIONS  (STANDING):  artificial  tears Solution 1 Drop(s) Both EYES two times a day  aspirin  chewable 81 milliGRAM(s) Oral daily  buDESOnide   0.5 milliGRAM(s) Respule 0.5 milliGRAM(s) Inhalation daily  docusate sodium 100 milliGRAM(s) Oral three times a day  enoxaparin Injectable 40 milliGRAM(s) SubCutaneous every 24 hours  ferrous    sulfate 325 milliGRAM(s) Oral daily  fluticasone propionate 50 MICROgram(s)/spray Nasal Spray 1 Spray(s) Both Nostrils two times a day  folic acid 1 milliGRAM(s) Oral daily  levETIRAcetam  IVPB 1000 milliGRAM(s) IV Intermittent every 12 hours  multivitamin 1 Tablet(s) Oral daily  OLANZapine 7.5 milliGRAM(s) Oral daily  pantoprazole    Tablet 40 milliGRAM(s) Oral before breakfast  phenytoin  IVPB 100 milliGRAM(s) IV Intermittent two times a day  piperacillin/tazobactam IVPB. 3.375 Gram(s) IV Intermittent every 8 hours    MEDICATIONS  (PRN):  ALBUTerol/ipratropium for Nebulization 3 milliLiter(s) Nebulizer every 6 hours PRN Shortness of Breath and/or Wheezing  LORazepam     Tablet 1 milliGRAM(s) Oral every 6 hours PRN Agitation  LORazepam   Injectable 1 milliGRAM(s) IV Push every 6 hours PRN Agitation      VITALS:  T(F): 98.1 (03-06-19 @ 12:20), Max: 98.1 (03-06-19 @ 05:16)  HR: 87 (03-06-19 @ 12:20) (81 - 87)  BP: 121/73 (03-06-19 @ 12:20) (121/73 - 147/66)  RR: 18 (03-06-19 @ 12:20) (17 - 18)  SpO2: 96% (03-06-19 @ 12:20)      PHYSICAL EXAM:  GENERAL: NAD, well-developed  HEAD:  Atraumatic, Normocephalic  EYES: EOMI, PERRLA, conjunctiva and sclera clear  NECK: Supple, No JVD  CHEST/LUNG: Clear to auscultation bilaterally; No wheeze  HEART: Regular rate and rhythm; No murmurs, rubs, or gallops  ABDOMEN: Soft, Nontender, Nondistended; Bowel sounds present  EXTREMITIES:  2+ Peripheral Pulses, No clubbing, cyanosis, or edema  PSYCH: AAOx3  NEUROLOGY: non-focal  SKIN: No rashes or lesions    LABS:              13.2                 x    | x    | x            8.73  >-----------< 363     ------------------------< x                     42.6                 x    | x    | x                                            Ca x     Mg x     Ph x           TPro  7.1  /  Alb  3.2      TBili  0.5  /  DBili  x         AST  54  /  ALT  160            AlkPhos  159      INR: 1.07 ;    PT: 12.2 SEC;    PTT: 32.0 SEC    RADIOLOGY & ADDITIONAL TESTS:  Imaging Personally Reviewed:  < from: NM Hepatobiliary Imaging w/ RX (03.06.19 @ 11:05) >  MPRESSION: Normal hepatobiliary scan.    No radionuclide evidence of acute cholecystitis.    < end of copied text >    [ ] Consultant(s) Notes Reviewed:  [x] Care Discussed with Consultants/Other Providers: ADS NP - CHIEF COMPLAINT: Patient is a 53y old  female who presents with a chief complaint of CP encephalopathy, transaminitis (05 Mar 2019 10:01)    SUBJECTIVE / OVERNIGHT EVENTS: Patient seen and examined. No acute events overnight. Remained afebrile overnight.     MEDICATIONS  (STANDING):  artificial  tears Solution 1 Drop(s) Both EYES two times a day  aspirin  chewable 81 milliGRAM(s) Oral daily  buDESOnide   0.5 milliGRAM(s) Respule 0.5 milliGRAM(s) Inhalation daily  docusate sodium 100 milliGRAM(s) Oral three times a day  enoxaparin Injectable 40 milliGRAM(s) SubCutaneous every 24 hours  ferrous    sulfate 325 milliGRAM(s) Oral daily  fluticasone propionate 50 MICROgram(s)/spray Nasal Spray 1 Spray(s) Both Nostrils two times a day  folic acid 1 milliGRAM(s) Oral daily  levETIRAcetam  IVPB 1000 milliGRAM(s) IV Intermittent every 12 hours  multivitamin 1 Tablet(s) Oral daily  OLANZapine 7.5 milliGRAM(s) Oral daily  pantoprazole    Tablet 40 milliGRAM(s) Oral before breakfast  phenytoin  IVPB 100 milliGRAM(s) IV Intermittent two times a day  piperacillin/tazobactam IVPB. 3.375 Gram(s) IV Intermittent every 8 hours    MEDICATIONS  (PRN):  ALBUTerol/ipratropium for Nebulization 3 milliLiter(s) Nebulizer every 6 hours PRN Shortness of Breath and/or Wheezing  LORazepam     Tablet 1 milliGRAM(s) Oral every 6 hours PRN Agitation  LORazepam   Injectable 1 milliGRAM(s) IV Push every 6 hours PRN Agitation    VITALS:  T(F): 98.1 (03-06-19 @ 12:20), Max: 98.1 (03-06-19 @ 05:16)  HR: 87 (03-06-19 @ 12:20) (81 - 87)  BP: 121/73 (03-06-19 @ 12:20) (121/73 - 147/66)  RR: 18 (03-06-19 @ 12:20) (17 - 18)  SpO2: 96% (03-06-19 @ 12:20)    PHYSICAL EXAM:  GENERAL: well-developed  CHEST/LUNG: Clear to auscultation bilaterally; No wheeze  HEART: Regular rate and rhythm; No murmurs, rubs, or gallops  ABDOMEN: tender to palpation in the RUQ; patient moans if palpated in that area; Bowel sounds present  EXTREMITIES:  2+ Peripheral Pulses, No clubbing, cyanosis, or edema    LABS:              13.2                 x    | x    | x            8.73  >-----------< 363     ------------------------< x                     42.6                 x    | x    | x                                            Ca x     Mg x     Ph x           TPro  7.1  /  Alb  3.2      TBili  0.5  /  DBili  x         AST  54  /  ALT  160            AlkPhos  159      INR: 1.07 ;    PT: 12.2 SEC;    PTT: 32.0 SEC    RADIOLOGY & ADDITIONAL TESTS:  Imaging Personally Reviewed:  < from: NM Hepatobiliary Imaging w/ RX (03.06.19 @ 11:05) >  MPRESSION: Normal hepatobiliary scan.    No radionuclide evidence of acute cholecystitis.    < end of copied text >    [ ] Consultant(s) Notes Reviewed:  [x] Care Discussed with Consultants/Other Providers: ADS NP -

## 2019-03-06 NOTE — PROGRESS NOTE ADULT - PROBLEM SELECTOR PLAN 2
- RVP and Urine cultures negative   - CXR w/ retrocardiac opacity, but CT chest with no consolidation or effusion   - EUS with concern for cholecystitis  - Acute encephalopathy could be 2/2 acute cholecystitis -- c/w zosyn IV until further discussion with surgery

## 2019-03-06 NOTE — PROGRESS NOTE ADULT - PROBLEM SELECTOR PLAN 3
- downtrending, continue to monitor  - avoid hepatotoxic medications   - autoimmune labs, PCR pending  - Patient unable to tolerate MRCP w/o anesthesia. D/W mother and sister, would not want to proceed with MRCP with sedation at this time.   - trend LFTs

## 2019-03-07 DIAGNOSIS — Z51.5 ENCOUNTER FOR PALLIATIVE CARE: ICD-10-CM

## 2019-03-07 DIAGNOSIS — R53.2 FUNCTIONAL QUADRIPLEGIA: ICD-10-CM

## 2019-03-07 LAB
ALBUMIN SERPL ELPH-MCNC: 3.3 G/DL — SIGNIFICANT CHANGE UP (ref 3.3–5)
ALP SERPL-CCNC: 154 U/L — HIGH (ref 40–120)
ALT FLD-CCNC: 126 U/L — HIGH (ref 4–33)
ANION GAP SERPL CALC-SCNC: 15 MMO/L — HIGH (ref 7–14)
AST SERPL-CCNC: 39 U/L — HIGH (ref 4–32)
BASOPHILS # BLD AUTO: 0.04 K/UL — SIGNIFICANT CHANGE UP (ref 0–0.2)
BASOPHILS NFR BLD AUTO: 0.5 % — SIGNIFICANT CHANGE UP (ref 0–2)
BILIRUB SERPL-MCNC: 0.4 MG/DL — SIGNIFICANT CHANGE UP (ref 0.2–1.2)
BUN SERPL-MCNC: 7 MG/DL — SIGNIFICANT CHANGE UP (ref 7–23)
CALCIUM SERPL-MCNC: 9.3 MG/DL — SIGNIFICANT CHANGE UP (ref 8.4–10.5)
CHLORIDE SERPL-SCNC: 103 MMOL/L — SIGNIFICANT CHANGE UP (ref 98–107)
CO2 SERPL-SCNC: 25 MMOL/L — SIGNIFICANT CHANGE UP (ref 22–31)
CREAT SERPL-MCNC: 0.56 MG/DL — SIGNIFICANT CHANGE UP (ref 0.5–1.3)
EOSINOPHIL # BLD AUTO: 0.29 K/UL — SIGNIFICANT CHANGE UP (ref 0–0.5)
EOSINOPHIL NFR BLD AUTO: 3.7 % — SIGNIFICANT CHANGE UP (ref 0–6)
GLUCOSE SERPL-MCNC: 73 MG/DL — SIGNIFICANT CHANGE UP (ref 70–99)
HCT VFR BLD CALC: 41.8 % — SIGNIFICANT CHANGE UP (ref 34.5–45)
HGB BLD-MCNC: 13.2 G/DL — SIGNIFICANT CHANGE UP (ref 11.5–15.5)
IMM GRANULOCYTES NFR BLD AUTO: 0.9 % — SIGNIFICANT CHANGE UP (ref 0–1.5)
LYMPHOCYTES # BLD AUTO: 2.36 K/UL — SIGNIFICANT CHANGE UP (ref 1–3.3)
LYMPHOCYTES # BLD AUTO: 30.2 % — SIGNIFICANT CHANGE UP (ref 13–44)
MAGNESIUM SERPL-MCNC: 1.7 MG/DL — SIGNIFICANT CHANGE UP (ref 1.6–2.6)
MCHC RBC-ENTMCNC: 31.2 PG — SIGNIFICANT CHANGE UP (ref 27–34)
MCHC RBC-ENTMCNC: 31.6 % — LOW (ref 32–36)
MCV RBC AUTO: 98.8 FL — SIGNIFICANT CHANGE UP (ref 80–100)
MONOCYTES # BLD AUTO: 0.68 K/UL — SIGNIFICANT CHANGE UP (ref 0–0.9)
MONOCYTES NFR BLD AUTO: 8.7 % — SIGNIFICANT CHANGE UP (ref 2–14)
NEUTROPHILS # BLD AUTO: 4.37 K/UL — SIGNIFICANT CHANGE UP (ref 1.8–7.4)
NEUTROPHILS NFR BLD AUTO: 56 % — SIGNIFICANT CHANGE UP (ref 43–77)
NRBC # FLD: 0 K/UL — LOW (ref 25–125)
PHOSPHATE SERPL-MCNC: 3 MG/DL — SIGNIFICANT CHANGE UP (ref 2.5–4.5)
PLATELET # BLD AUTO: 355 K/UL — SIGNIFICANT CHANGE UP (ref 150–400)
PMV BLD: 10.4 FL — SIGNIFICANT CHANGE UP (ref 7–13)
POTASSIUM SERPL-MCNC: 4.3 MMOL/L — SIGNIFICANT CHANGE UP (ref 3.5–5.3)
POTASSIUM SERPL-SCNC: 4.3 MMOL/L — SIGNIFICANT CHANGE UP (ref 3.5–5.3)
PROT SERPL-MCNC: 7.2 G/DL — SIGNIFICANT CHANGE UP (ref 6–8.3)
RBC # BLD: 4.23 M/UL — SIGNIFICANT CHANGE UP (ref 3.8–5.2)
RBC # FLD: 13 % — SIGNIFICANT CHANGE UP (ref 10.3–14.5)
SODIUM SERPL-SCNC: 143 MMOL/L — SIGNIFICANT CHANGE UP (ref 135–145)
WBC # BLD: 7.81 K/UL — SIGNIFICANT CHANGE UP (ref 3.8–10.5)
WBC # FLD AUTO: 7.81 K/UL — SIGNIFICANT CHANGE UP (ref 3.8–10.5)

## 2019-03-07 PROCEDURE — 99233 SBSQ HOSP IP/OBS HIGH 50: CPT

## 2019-03-07 PROCEDURE — 99223 1ST HOSP IP/OBS HIGH 75: CPT | Mod: GC

## 2019-03-07 RX ORDER — SODIUM CHLORIDE 9 MG/ML
1000 INJECTION, SOLUTION INTRAVENOUS
Refills: 0 | Status: DISCONTINUED | OUTPATIENT
Start: 2019-03-07 | End: 2019-03-11

## 2019-03-07 RX ADMIN — LEVETIRACETAM 400 MILLIGRAM(S): 250 TABLET, FILM COATED ORAL at 17:06

## 2019-03-07 RX ADMIN — Medication 1 DROP(S): at 05:08

## 2019-03-07 RX ADMIN — ENOXAPARIN SODIUM 40 MILLIGRAM(S): 100 INJECTION SUBCUTANEOUS at 11:05

## 2019-03-07 RX ADMIN — Medication 1 DROP(S): at 17:03

## 2019-03-07 RX ADMIN — PANTOPRAZOLE SODIUM 40 MILLIGRAM(S): 20 TABLET, DELAYED RELEASE ORAL at 05:08

## 2019-03-07 RX ADMIN — Medication 104 MILLIGRAM(S): at 17:34

## 2019-03-07 RX ADMIN — Medication 100 MILLIGRAM(S): at 05:08

## 2019-03-07 RX ADMIN — PIPERACILLIN AND TAZOBACTAM 25 GRAM(S): 4; .5 INJECTION, POWDER, LYOPHILIZED, FOR SOLUTION INTRAVENOUS at 11:05

## 2019-03-07 RX ADMIN — SODIUM CHLORIDE 75 MILLILITER(S): 9 INJECTION, SOLUTION INTRAVENOUS at 13:18

## 2019-03-07 RX ADMIN — PIPERACILLIN AND TAZOBACTAM 25 GRAM(S): 4; .5 INJECTION, POWDER, LYOPHILIZED, FOR SOLUTION INTRAVENOUS at 19:27

## 2019-03-07 RX ADMIN — Medication 1 SPRAY(S): at 05:07

## 2019-03-07 RX ADMIN — Medication 104 MILLIGRAM(S): at 05:08

## 2019-03-07 RX ADMIN — PIPERACILLIN AND TAZOBACTAM 25 GRAM(S): 4; .5 INJECTION, POWDER, LYOPHILIZED, FOR SOLUTION INTRAVENOUS at 03:14

## 2019-03-07 RX ADMIN — Medication 1 SPRAY(S): at 17:06

## 2019-03-07 RX ADMIN — LEVETIRACETAM 400 MILLIGRAM(S): 250 TABLET, FILM COATED ORAL at 06:14

## 2019-03-07 RX ADMIN — SODIUM CHLORIDE 75 MILLILITER(S): 9 INJECTION, SOLUTION INTRAVENOUS at 23:43

## 2019-03-07 NOTE — CHART NOTE - NSCHARTNOTEFT_GEN_A_CORE
spoke with patients  Jd Barlow [phone], as per Jd patient usually eats all her meals at the group home but recently has not been eating well and had appeared fatigued. will f/u with psych     d/w Attending and RN

## 2019-03-07 NOTE — SWALLOW BEDSIDE ASSESSMENT ADULT - ASR SWALLOW REFERRAL
Registered dietician consult to ensure patient maintains their nutritional/hydration/caloric needs via non-oral diet./Registered Dietitian
Registered dietician consult to ensure patient maintains their nutritional/hydration/caloric needs via non-oral diet./Registered Dietitian

## 2019-03-07 NOTE — CONSULT NOTE ADULT - PROBLEM SELECTOR RECOMMENDATION 9
Patient was refusing to take po medications and also refused to participate in speech and swallow evaluation. Her mother does not want feeding tube and is hopeful that she will return to her group home and continue eating as she was earlier.

## 2019-03-07 NOTE — SWALLOW BEDSIDE ASSESSMENT ADULT - SLP PERTINENT HISTORY OF CURRENT PROBLEM
54 yo woman w/ PMH of CP(non verbal, does not ambulate), intractable seizures [facility] w/ [facility]opathy
54 yo woman w/ PMH of CP(non verbal, does not ambulate), intractable seizures presenting [facility] w/ encephalopathy

## 2019-03-07 NOTE — CONSULT NOTE ADULT - PROBLEM SELECTOR RECOMMENDATION 3
Patient with history of known seizures, did not want to take PO seizure medications so started on IV Meds. Continue management as per primary team.

## 2019-03-07 NOTE — CONSULT NOTE ADULT - ASSESSMENT
53 year old woman with Dysphagia, seizures, functional quadriplegia, cerebral Palsy and Encounter for palliative care.

## 2019-03-07 NOTE — SWALLOW BEDSIDE ASSESSMENT ADULT - COMMENTS
Patient was seen at bedside and was found alert. Patient turned head away from all trials attempted despite multiple attempts by clinician and RN.  At this time, oral intake is contraindicated and non-oral means of nutrition/hydration is recommended as patient is at high risk for dehydration and malnutrition due to refusal.  As per nursing, patient is refusing all oral nutrition/hydration/medication.      Patient is known to this department and had previous bedside evaluations during previous hospitalizations.  Most Previous MBS performed on 10/29/2018 during hospitalization with recommendation of puree and honey thickened liquids.

## 2019-03-07 NOTE — PROGRESS NOTE ADULT - ATTENDING COMMENTS
11. Dispo:  -awaiting palliative care consult to discuss GOC (PEG or no PEG) prior to discharge back to [facility]

## 2019-03-07 NOTE — SWALLOW BEDSIDE ASSESSMENT ADULT - ORAL PREPARATORY PHASE
patient refused all feeding attempts by turning head and swatting with arm/Refuses to accept bolus into oral cavity
patient refused all feeding attempts by turning head and swatting with arm/Refuses to accept bolus into oral cavity

## 2019-03-07 NOTE — PROGRESS NOTE ADULT - PROBLEM SELECTOR PLAN 4
- downtrending, continue to monitor  - avoid hepatotoxic medications   - autoimmune labs, PCR pending  - HIDA scan negative for acute cholecystitis  - trend LFTs

## 2019-03-07 NOTE — PROGRESS NOTE ADULT - SUBJECTIVE AND OBJECTIVE BOX
CHIEF COMPLAINT: Patient is a 53y old  female who presents with a chief complaint of CP encephalopathy, transaminitis (05 Mar 2019 10:01)    SUBJECTIVE / OVERNIGHT EVENTS: Patient seen and examined. Patient refusing speech swallow. A bit more aggressive this morning.     MEDICATIONS  (STANDING):  artificial  tears Solution 1 Drop(s) Both EYES two times a day  aspirin  chewable 81 milliGRAM(s) Oral daily  buDESOnide   0.5 milliGRAM(s) Respule 0.5 milliGRAM(s) Inhalation daily  docusate sodium 100 milliGRAM(s) Oral three times a day  enoxaparin Injectable 40 milliGRAM(s) SubCutaneous every 24 hours  ferrous    sulfate 325 milliGRAM(s) Oral daily  fluticasone propionate 50 MICROgram(s)/spray Nasal Spray 1 Spray(s) Both Nostrils two times a day  folic acid 1 milliGRAM(s) Oral daily  levETIRAcetam  IVPB 1000 milliGRAM(s) IV Intermittent every 12 hours  multivitamin 1 Tablet(s) Oral daily  OLANZapine 7.5 milliGRAM(s) Oral daily  pantoprazole    Tablet 40 milliGRAM(s) Oral before breakfast  phenytoin  IVPB 100 milliGRAM(s) IV Intermittent two times a day  piperacillin/tazobactam IVPB. 3.375 Gram(s) IV Intermittent every 8 hours    MEDICATIONS  (PRN):  ALBUTerol/ipratropium for Nebulization 3 milliLiter(s) Nebulizer every 6 hours PRN Shortness of Breath and/or Wheezing  LORazepam     Tablet 1 milliGRAM(s) Oral every 6 hours PRN Agitation  LORazepam   Injectable 1 milliGRAM(s) IV Push every 6 hours PRN Agitation    VITALS:  T(F): 97.9 (03-07-19 @ 11:09), Max: 98.4 (03-06-19 @ 19:10)  HR: 85 (03-07-19 @ 11:09) (80 - 87)  BP: 114/72 (03-07-19 @ 11:09) (114/72 - 132/64)  RR: 18 (03-07-19 @ 11:09) (18 - 18)  SpO2: 98% (03-07-19 @ 11:09)    PHYSICAL EXAM:  refusing physical exam    LABS:              13.2                 143  | 25   | 7            7.81  >-----------< 355     ------------------------< 73                    41.8                 4.3  | 103  | 0.56                                         Ca 9.3   Mg 1.7   Ph 3.0         TPro  7.2  /  Alb  3.3      TBili  0.4  /  DBili  x         AST  39  /  ALT  126            AlkPhos  154      [ ] Consultant(s) Notes Reviewed:  [x] Care Discussed with Consultants/Other Providers: ADS NP - discussed obtaining palliative consult

## 2019-03-07 NOTE — SWALLOW BEDSIDE ASSESSMENT ADULT - SWALLOW EVAL: RECOMMENDED DIET
Initiate non-oral means or nutrition/hydration secondary to refusals.
Initiate non-oral means or nutrition/hydration secondary to refusal

## 2019-03-07 NOTE — CONSULT NOTE ADULT - SUBJECTIVE AND OBJECTIVE BOX
HPI:  53 year old woman w/ PMH of CP(non verbal, does not ambulate), intractable [facility]. Pt is non-verbal at baseline. Per discussion with group home staff pt was at neurologist office yesterday, reported that patient was different than baseline and needed to go to [facility] ED for evaluation. Per staff pt has had behavioral changes over the past week, increasingly aggressive, scratching individuals. No fever, chills, SOB, CP. Non productive cough for the past week. One episode of emesis last week. Pt does not ambulate at baseline, quadraplegic. Pt previously visited [facility] ED 2/16 where she was d/c on cephalexin 500 mg bid for UTI.     In the ED:  VS: T 99.1, HR 82, /84, RR 17/97  Labs: WBC 8.52, Hgb 14.2, Platelet 290, Na+ 145, K+ 3.8, Cl 29, Bicarb 14, BUN 15, Cr 0.62, Alk phos 132, AST/ALT 58/69, Lactate 2.0, UA large LEC, neg nitrite  Imaging: CTH: No intracranial hemorrhage, mass effect or midline shift. CXR: Left pleural effusion, left retrocardiac opacity  Medications: vanc 1 g, cefepime 1 g, haldol 2.5 mg mg x 2, ativan 1 mg x 2, versed 2 mg x 1, NS 2 L (27 Feb 2019 09:34). Patient examined while resting in bed. Patient able to track, but is non verbal.     PERTINENT PM/SXH:   Intellectual disability  Constipation  Seizure disorder  Cerebral palsy    No significant past surgical history    FAMILY HISTORY:  No pertinent family history in first degree relatives      SOCIAL HISTORY:   Significant other/partner: Yes [ ]  No [ x] Children:  Yes [ ]  No [ x] Amish/Spirituality:  Substance hx: Yes[ ]  No [x ]   Tobacco hx:  Yes [ ] No [x ]   Alcohol hx: Yes [ ] No [x ]   Home Opioid hx:  [ ] I-Stop Reference No:  Living Situation: [ ]Home  [x ]Long term care - group home  [ ]Rehab [ ]Other    ADVANCE DIRECTIVES:    Full code  DECISION MAKER(s):  [ ] Health Care Proxy(s)  [ x] Surrogate(s)  [ ] Guardian           Name(s): Phone Number(s):  Alia Ponce Mother     BASELINE (I)ADL(s) (prior to admission):  [place]: [ ]Total  [ ] Moderate [ x]Dependent    Allergies    Topamax (Unknown)    Intolerances    MEDICATIONS  (STANDING):  artificial  tears Solution 1 Drop(s) Both EYES two times a day  aspirin  chewable 81 milliGRAM(s) Oral daily  buDESOnide   0.5 milliGRAM(s) Respule 0.5 milliGRAM(s) Inhalation daily  dextrose 5% + sodium chloride 0.45%. 1000 milliLiter(s) (75 mL/Hr) IV Continuous <Continuous>  docusate sodium 100 milliGRAM(s) Oral three times a day  enoxaparin Injectable 40 milliGRAM(s) SubCutaneous every 24 hours  ferrous    sulfate 325 milliGRAM(s) Oral daily  fluticasone propionate 50 MICROgram(s)/spray Nasal Spray 1 Spray(s) Both Nostrils two times a day  folic acid 1 milliGRAM(s) Oral daily  levETIRAcetam  IVPB 1000 milliGRAM(s) IV Intermittent every 12 hours  multivitamin 1 Tablet(s) Oral daily  OLANZapine 7.5 milliGRAM(s) Oral daily  pantoprazole    Tablet 40 milliGRAM(s) Oral before breakfast  phenytoin  IVPB 100 milliGRAM(s) IV Intermittent two times a day  piperacillin/tazobactam IVPB. 3.375 Gram(s) IV Intermittent every 8 hours    MEDICATIONS  (PRN):  ALBUTerol/ipratropium for Nebulization 3 milliLiter(s) Nebulizer every 6 hours PRN Shortness of Breath and/or Wheezing  LORazepam     Tablet 1 milliGRAM(s) Oral every 6 hours PRN Agitation  LORazepam   Injectable 1 milliGRAM(s) IV Push every 6 hours PRN Agitation    PRESENT SYMPTOMS: [x ]Unable to obtain due to poor mentation   Source if other than patient:  [ ]Family   [ ]Team     ROS: Unable to assess secondary to mental status    Pain (Impact on QOL):    Location -   Severity -        Minimal acceptable level (0-10 scale):  Quality:   Onset:   Duration:                 Aggravating factors -  Relieving factors -  Radiation -    PAIN AD Score:     [email or web address] (press ctrl +  left click to view)    Dyspnea:  Yes [ ] No [ ] - [ ]Mild [ ]Moderate [ ]Severe  Anxiety:    Yes [ ] No [ ] - [ ]Mild [ ]Moderate [ ]Severe  Fatigue:    Yes [ ] No [ ] - [ ]Mild [ ]Moderate [ ]Severe  Nausea:    Yes [ ] No [ ] - [ ]Mild [ ]Moderate [ ]Severe                         Loss of appetite: Yes [ ] No [ ] - [ ]Mild [ ]Moderate [ ]Severe             Constipation:  Yes [ ] No [ ] - [ ]Mild [ ]Moderate [ ]Severe  Grief:  Yes [ ] No [ ]     Other Symptoms:  [ ]All other review of systems negative     Karnofsky Performance Score/Palliative Performance Status Version 2:        30 %    http://palliative.info/resource_material/[email or web address].pdf    PHYSICAL EXAM:  Vital Signs Last 24 Hrs  T(C): 36.6 (07 Mar 2019 11:09), Max: 36.9 (06 Mar 2019 19:10)  T(F): 97.9 (07 Mar 2019 11:09), Max: 98.4 (06 Mar 2019 19:10)  HR: 85 (07 Mar 2019 11:09) (80 - 85)  BP: 114/72 (07 Mar 2019 11:09) (114/72 - 132/64)  BP(mean): --  RR: 18 (07 Mar 2019 11:09) (18 - 18)  SpO2: 98% (07 Mar 2019 11:09) (97% - 100%) I&O's Summary    GENERAL:  [x ]Alert  [ ]Oriented x   [ ]Lethargic  [ ]Cachexia  [ ]Unarousable  [ ]Verbal  [ ]Non-Verbal  Behavioral:   [ ] Anxiety  [ ] Delirium [ ] Agitation [ ] Other  HEENT:  [ ]Normal   [x ]Dry mouth   [ ]ET Tube/Trach  [ ]Oral lesions  PULMONARY:   [x ]Clear  [ ]Tachypnea  [ ]Audible excessive secretions   [ ]Rhonchi        [ ]Right [ ]Left [ ]Bilateral  [ ]Crackles        [ ]Right [ ]Left [ ]Bilateral  [ ]Wheezing     [ ]Right [ ]Left [ ]Bilateral  CARDIOVASCULAR:    [ x]Regular [ ]Irregular [ ]Tachy  [ ]Main [ ]Murmur [ ]Other  GASTROINTESTINAL:  [x ]Soft  [ ]Distended   [ ]+BS  [x ]Non tender [ ]Tender  [ ]PEG [ ]OGT/ NGT  Last BM:   GENITOURINARY:  [ ]Normal [ x] Incontinent   [ ]Oliguria/Anuria   [ ]Cazares  MUSCULOSKELETAL:   [ ]Normal   [ ]Weakness  [x ]Bed/Wheelchair bound [ ]Edema  NEUROLOGIC:   [ ]No focal deficits  [ x] Cognitive impairment  [ ] Dysphagia [ ]Dysarthria [ ] Paresis [ ]Other   SKIN:   [x ]Normal   [ ]Pressure ulcer(s)  [ ]Rash    LABS:                        13.2   7.81  )-----------( 355      ( 07 Mar 2019 06:15 )             41.8   03-07    143  |  103  |  7   ----------------------------<  73  4.3   |  25  |  0.56    Ca    9.3      07 Mar 2019 06:15  Phos  3.0     03-07  Mg     1.7     03-07    TPro  7.2  /  Alb  3.3  /  TBili  0.4  /  DBili  x   /  AST  39<H>  /  ALT  126<H>  /  AlkPhos  154<H>  03-07      RADIOLOGY & ADDITIONAL STUDIES:  < from: CT Chest No Cont (02.28.19 @ 18:23) >  EXAM:  CT CHEST        PROCEDURE DATE:  Feb 28 2019 IMPRESSION:     No consolidation, or pleural effusion or significant atelectasis.   Prominent left cardiophrenic pericardial fat pad.    New 7 mm nodular density within the basilar right lower lobe (series 2   image 58) is nonspecific and may represent a focus of mucoid impaction.   Recommend follow-up chest CT in 3 months to determine stability.    Mild CBD dilatation.    < end of copied text >    PROTEIN CALORIE MALNUTRITION PRESENT: [ ] Yes [ ] No  [ ] PPSV2 < or = to 30% [ ] significant weight loss  [ ] poor nutritional intake [ ] catabolic state [ ] anasarca     Albumin, Serum: 3.3 g/dL (03-07-19 @ 06:15)      REFERRALS:   [ ]Chaplaincy  [ ] Hospice  [ ]Child Life  [ ]Social Work  [ ]Case management [ ]Holistic Therapy   Goals of Care Discussion Document: HPI:  53 year old woman w/ PMH of CP(non verbal, does not ambulate), intractable [facility]. Pt is non-verbal at baseline. Per discussion with group home staff pt was at neurologist office yesterday, reported that patient was different than baseline and needed to go to [facility] ED for evaluation. Per staff pt has had behavioral changes over the past week, increasingly aggressive, scratching individuals. No fever, chills, SOB, CP. Non productive cough for the past week. One episode of emesis last week. Pt does not ambulate at baseline, quadraplegic. Pt previously visited [facility] ED 2/16 where she was d/c on cephalexin 500 mg bid for UTI.     In the ED:  VS: T 99.1, HR 82, /84, RR 17/97  Labs: WBC 8.52, Hgb 14.2, Platelet 290, Na+ 145, K+ 3.8, Cl 29, Bicarb 14, BUN 15, Cr 0.62, Alk phos 132, AST/ALT 58/69, Lactate 2.0, UA large LEC, neg nitrite  Imaging: CTH: No intracranial hemorrhage, mass effect or midline shift. CXR: Left pleural effusion, left retrocardiac opacity  Medications: vanc 1 g, cefepime 1 g, haldol 2.5 mg mg x 2, ativan 1 mg x 2, versed 2 mg x 1, NS 2 L (27 Feb 2019 09:34). Patient examined while resting in bed. Patient able to track, but is non verbal.     PERTINENT PM/SXH:   Intellectual disability  Constipation  Seizure disorder  Cerebral palsy    No significant past surgical history    FAMILY HISTORY:  No pertinent family history in first degree relatives    SOCIAL HISTORY:   Significant other/partner: Yes [ ]  No [ x] Children:  Yes [ ]  No [ x] Oriental orthodox/Spirituality:  Substance hx: Yes[ ]  No [x ]   Tobacco hx:  Yes [ ] No [x ]   Alcohol hx: Yes [ ] No [x ]   Home Opioid hx:  [ ] I-Stop Reference No:  Living Situation: [ ]Home  [x ]Long term care - group home  [ ]Rehab [ ]Other    ADVANCE DIRECTIVES:    Full code  DECISION MAKER(s):  [ ] Health Care Proxy(s)  [ x] Surrogate(s)  [ ] Guardian           Name(s): Phone Number(s):  Alia Ponce Mother     BASELINE (I)ADL(s) (prior to admission):  [place]: [ ]Total  [ ] Moderate [ x]Dependent    Allergies    Topamax (Unknown)    Intolerances    MEDICATIONS  (STANDING):  artificial  tears Solution 1 Drop(s) Both EYES two times a day  aspirin  chewable 81 milliGRAM(s) Oral daily  buDESOnide   0.5 milliGRAM(s) Respule 0.5 milliGRAM(s) Inhalation daily  dextrose 5% + sodium chloride 0.45%. 1000 milliLiter(s) (75 mL/Hr) IV Continuous <Continuous>  docusate sodium 100 milliGRAM(s) Oral three times a day  enoxaparin Injectable 40 milliGRAM(s) SubCutaneous every 24 hours  ferrous    sulfate 325 milliGRAM(s) Oral daily  fluticasone propionate 50 MICROgram(s)/spray Nasal Spray 1 Spray(s) Both Nostrils two times a day  folic acid 1 milliGRAM(s) Oral daily  levETIRAcetam  IVPB 1000 milliGRAM(s) IV Intermittent every 12 hours  multivitamin 1 Tablet(s) Oral daily  OLANZapine 7.5 milliGRAM(s) Oral daily  pantoprazole    Tablet 40 milliGRAM(s) Oral before breakfast  phenytoin  IVPB 100 milliGRAM(s) IV Intermittent two times a day  piperacillin/tazobactam IVPB. 3.375 Gram(s) IV Intermittent every 8 hours    MEDICATIONS  (PRN):  ALBUTerol/ipratropium for Nebulization 3 milliLiter(s) Nebulizer every 6 hours PRN Shortness of Breath and/or Wheezing  LORazepam     Tablet 1 milliGRAM(s) Oral every 6 hours PRN Agitation  LORazepam   Injectable 1 milliGRAM(s) IV Push every 6 hours PRN Agitation    PRESENT SYMPTOMS: [x ]Unable to obtain due to poor mentation   Source if other than patient:  [ ]Family   [ ]Team     ROS: Unable to assess secondary to mental status    Pain (Impact on QOL):    Location -   Severity -        Minimal acceptable level (0-10 scale):  Quality:   Onset:   Duration:                 Aggravating factors -  Relieving factors -  Radiation -    PAIN AD Score:     [email or web address] (press ctrl +  left click to view)    Dyspnea:  Yes [ ] No [ ] - [ ]Mild [ ]Moderate [ ]Severe  Anxiety:    Yes [ ] No [ ] - [ ]Mild [ ]Moderate [ ]Severe  Fatigue:    Yes [ ] No [ ] - [ ]Mild [ ]Moderate [ ]Severe  Nausea:    Yes [ ] No [ ] - [ ]Mild [ ]Moderate [ ]Severe                         Loss of appetite: Yes [ ] No [ ] - [ ]Mild [ ]Moderate [ ]Severe             Constipation:  Yes [ ] No [ ] - [ ]Mild [ ]Moderate [ ]Severe  Grief:  Yes [ ] No [ ]     Other Symptoms:  [ ]All other review of systems negative     Karnofsky Performance Score/Palliative Performance Status Version 2:        30 %    http://palliative.info/resource_material/[email or web address].pdf    PHYSICAL EXAM:  Vital Signs Last 24 Hrs  T(C): 36.6 (07 Mar 2019 11:09), Max: 36.9 (06 Mar 2019 19:10)  T(F): 97.9 (07 Mar 2019 11:09), Max: 98.4 (06 Mar 2019 19:10)  HR: 85 (07 Mar 2019 11:09) (80 - 85)  BP: 114/72 (07 Mar 2019 11:09) (114/72 - 132/64)  BP(mean): --  RR: 18 (07 Mar 2019 11:09) (18 - 18)  SpO2: 98% (07 Mar 2019 11:09) (97% - 100%) I&O's Summary    GENERAL:  [x ]Alert  [ ]Oriented x   [ ]Lethargic  [ ]Cachexia  [ ]Unarousable  [ ]Verbal  [x ]Non-Verbal  Behavioral:   [ ] Anxiety  [ ] Delirium [ ] Agitation [ ] Other  HEENT:  [ ]Normal   [x ]Dry mouth   [ ]ET Tube/Trach  [ ]Oral lesions  PULMONARY:   [x ]Clear  [ ]Tachypnea  [ ]Audible excessive secretions   [ ]Rhonchi        [ ]Right [ ]Left [ ]Bilateral  [ ]Crackles        [ ]Right [ ]Left [ ]Bilateral  [ ]Wheezing     [ ]Right [ ]Left [ ]Bilateral  CARDIOVASCULAR:    [ x]Regular [ ]Irregular [ ]Tachy  [ ]Main [ ]Murmur [ ]Other  GASTROINTESTINAL:  [x ]Soft  [ ]Distended   [ ]+BS  [x ]Non tender [ ]Tender  [ ]PEG [ ]OGT/ NGT  Last BM:   GENITOURINARY:  [ ]Normal [ x] Incontinent   [ ]Oliguria/Anuria   [ ]Cazares  MUSCULOSKELETAL:   [ ]Normal   [ ]Weakness  [x ]Bed/Wheelchair bound [ ]Edema  NEUROLOGIC:   [ ]No focal deficits  [ x] Cognitive impairment  [ ] Dysphagia [ ]Dysarthria [ ] Paresis [ ]Other   SKIN:   [x ]Normal   [ ]Pressure ulcer(s)  [ ]Rash    LABS:                        13.2   7.81  )-----------( 355      ( 07 Mar 2019 06:15 )             41.8   03-07    143  |  103  |  7   ----------------------------<  73  4.3   |  25  |  0.56    Ca    9.3      07 Mar 2019 06:15  Phos  3.0     03-07  Mg     1.7     03-07    TPro  7.2  /  Alb  3.3  /  TBili  0.4  /  DBili  x   /  AST  39<H>  /  ALT  126<H>  /  AlkPhos  154<H>  03-07    RADIOLOGY & ADDITIONAL STUDIES:  < from: CT Chest No Cont (02.28.19 @ 18:23) >  EXAM:  CT CHEST      PROCEDURE DATE:  Feb 28 2019 IMPRESSION:     No consolidation, or pleural effusion or significant atelectasis.   Prominent left cardiophrenic pericardial fat pad.    New 7 mm nodular density within the basilar right lower lobe (series 2   image 58) is nonspecific and may represent a focus of mucoid impaction.   Recommend follow-up chest CT in 3 months to determine stability.    Mild CBD dilatation.    PROTEIN CALORIE MALNUTRITION PRESENT: [ ] Yes [ ] No  [x ] PPSV2 < or = to 30% [ ] significant weight loss  [ ] poor nutritional intake [ ] catabolic state [ ] anasarca     Albumin, Serum: 3.3 g/dL (03-07-19 @ 06:15)    REFERRALS:   [ ]Chaplaincy  [ ] Hospice  [ ]Child Life  [ ]Social Work  [ ]Case management [ ]Holistic Therapy   Goals of Care Discussion Document:

## 2019-03-07 NOTE — PROGRESS NOTE ADULT - PROBLEM SELECTOR PLAN 3
- RVP and Urine cultures negative   - CXR w/ retrocardiac opacity, but CT chest with no consolidation or effusion

## 2019-03-07 NOTE — CONSULT NOTE ADULT - ATTENDING COMMENTS
Pt seen with NP.  Agree with above plan.  Mother is pts surrogate, no HCP documentation.  Pt resides in Group Home.

## 2019-03-07 NOTE — SWALLOW BEDSIDE ASSESSMENT ADULT - SWALLOW EVAL: DIAGNOSIS
SLP and Nursing Staff attempted to administer various puree consistencies but patient refused all trials despite encouragement and responded with head turns and closed mouth.  Patient is not accepting of PO intake at this time and as a result patient is at high risk for dehydration and malnutrition.   At this time, oral intake is contraindicated and MD to discuss goals of care regarding providing non-oral means of nutrition/hydration as patient is at high risk for dehydration and malnutrition secondary to refusals.

## 2019-03-07 NOTE — PROGRESS NOTE ADULT - PROBLEM SELECTOR PLAN 2
- patient was on dysphagia 1 diet ; now NPO until further notice  - will order D5 1/2 NS @ 75cc/hr   - aspiration precautions  - refusing speech and swallow; will consult palliative care to discuss GOC and PEG if patient continues to refuse to eat

## 2019-03-07 NOTE — PROGRESS NOTE ADULT - PROBLEM SELECTOR PLAN 1
-HIDA scan negative for acute cholecystitis  -c/w Zosyn IV for now for total of 10 days; can d/c on PO augmentin when ready for discharge. c/w zosyn IV day #3/10  -surgery in agreement.

## 2019-03-07 NOTE — CONSULT NOTE ADULT - PROBLEM SELECTOR RECOMMENDATION 5
Discussion had with patients mother, she understands that she has not been eating at the hospital and stated that at home she eats a lot better. She also stated that she would not want a feeding tube for her daughter, but given that the patient resides at a group home she is protected by the state and if it gets to that point, [facility] will need to be contacted.

## 2019-03-08 ENCOUNTER — TRANSCRIPTION ENCOUNTER (OUTPATIENT)
Age: 54
End: 2019-03-08

## 2019-03-08 DIAGNOSIS — M79.89 OTHER SPECIFIED SOFT TISSUE DISORDERS: ICD-10-CM

## 2019-03-08 LAB
ALBUMIN SERPL ELPH-MCNC: 3.6 G/DL — SIGNIFICANT CHANGE UP (ref 3.3–5)
ALP SERPL-CCNC: 152 U/L — HIGH (ref 40–120)
ALT FLD-CCNC: 104 U/L — HIGH (ref 4–33)
ANION GAP SERPL CALC-SCNC: 18 MMO/L — HIGH (ref 7–14)
AST SERPL-CCNC: 33 U/L — HIGH (ref 4–32)
BASOPHILS # BLD AUTO: 0.04 K/UL — SIGNIFICANT CHANGE UP (ref 0–0.2)
BASOPHILS NFR BLD AUTO: 0.5 % — SIGNIFICANT CHANGE UP (ref 0–2)
BILIRUB SERPL-MCNC: 0.5 MG/DL — SIGNIFICANT CHANGE UP (ref 0.2–1.2)
BUN SERPL-MCNC: 6 MG/DL — LOW (ref 7–23)
CALCIUM SERPL-MCNC: 9.4 MG/DL — SIGNIFICANT CHANGE UP (ref 8.4–10.5)
CHLORIDE SERPL-SCNC: 100 MMOL/L — SIGNIFICANT CHANGE UP (ref 98–107)
CO2 SERPL-SCNC: 22 MMOL/L — SIGNIFICANT CHANGE UP (ref 22–31)
CREAT SERPL-MCNC: 0.55 MG/DL — SIGNIFICANT CHANGE UP (ref 0.5–1.3)
EOSINOPHIL # BLD AUTO: 0.34 K/UL — SIGNIFICANT CHANGE UP (ref 0–0.5)
EOSINOPHIL NFR BLD AUTO: 4.4 % — SIGNIFICANT CHANGE UP (ref 0–6)
GLUCOSE SERPL-MCNC: 97 MG/DL — SIGNIFICANT CHANGE UP (ref 70–99)
HCT VFR BLD CALC: 47.8 % — HIGH (ref 34.5–45)
HGB BLD-MCNC: 14.8 G/DL — SIGNIFICANT CHANGE UP (ref 11.5–15.5)
IMM GRANULOCYTES NFR BLD AUTO: 0.7 % — SIGNIFICANT CHANGE UP (ref 0–1.5)
LYMPHOCYTES # BLD AUTO: 1.85 K/UL — SIGNIFICANT CHANGE UP (ref 1–3.3)
LYMPHOCYTES # BLD AUTO: 24.1 % — SIGNIFICANT CHANGE UP (ref 13–44)
MAGNESIUM SERPL-MCNC: 1.7 MG/DL — SIGNIFICANT CHANGE UP (ref 1.6–2.6)
MCHC RBC-ENTMCNC: 31 % — LOW (ref 32–36)
MCHC RBC-ENTMCNC: 31 PG — SIGNIFICANT CHANGE UP (ref 27–34)
MCV RBC AUTO: 100 FL — SIGNIFICANT CHANGE UP (ref 80–100)
MONOCYTES # BLD AUTO: 0.71 K/UL — SIGNIFICANT CHANGE UP (ref 0–0.9)
MONOCYTES NFR BLD AUTO: 9.2 % — SIGNIFICANT CHANGE UP (ref 2–14)
NEUTROPHILS # BLD AUTO: 4.69 K/UL — SIGNIFICANT CHANGE UP (ref 1.8–7.4)
NEUTROPHILS NFR BLD AUTO: 61.1 % — SIGNIFICANT CHANGE UP (ref 43–77)
NRBC # FLD: 0 K/UL — LOW (ref 25–125)
PHOSPHATE SERPL-MCNC: 2.3 MG/DL — LOW (ref 2.5–4.5)
PLATELET # BLD AUTO: 354 K/UL — SIGNIFICANT CHANGE UP (ref 150–400)
PMV BLD: 10.5 FL — SIGNIFICANT CHANGE UP (ref 7–13)
POTASSIUM SERPL-MCNC: 3.9 MMOL/L — SIGNIFICANT CHANGE UP (ref 3.5–5.3)
POTASSIUM SERPL-SCNC: 3.9 MMOL/L — SIGNIFICANT CHANGE UP (ref 3.5–5.3)
PROT SERPL-MCNC: 7.9 G/DL — SIGNIFICANT CHANGE UP (ref 6–8.3)
RBC # BLD: 4.78 M/UL — SIGNIFICANT CHANGE UP (ref 3.8–5.2)
RBC # FLD: 13.1 % — SIGNIFICANT CHANGE UP (ref 10.3–14.5)
SODIUM SERPL-SCNC: 140 MMOL/L — SIGNIFICANT CHANGE UP (ref 135–145)
WBC # BLD: 7.68 K/UL — SIGNIFICANT CHANGE UP (ref 3.8–10.5)
WBC # FLD AUTO: 7.68 K/UL — SIGNIFICANT CHANGE UP (ref 3.8–10.5)

## 2019-03-08 PROCEDURE — 99233 SBSQ HOSP IP/OBS HIGH 50: CPT

## 2019-03-08 PROCEDURE — 93971 EXTREMITY STUDY: CPT | Mod: 26

## 2019-03-08 RX ORDER — LEVETIRACETAM 250 MG/1
1 TABLET, FILM COATED ORAL
Qty: 60 | Refills: 0
Start: 2019-03-08 | End: 2019-04-06

## 2019-03-08 RX ORDER — FLUTICASONE PROPIONATE 50 MCG
1 SPRAY, SUSPENSION NASAL
Qty: 1 | Refills: 0
Start: 2019-03-08 | End: 2019-04-06

## 2019-03-08 RX ORDER — ASPIRIN/CALCIUM CARB/MAGNESIUM 324 MG
1 TABLET ORAL
Qty: 30 | Refills: 0
Start: 2019-03-08 | End: 2019-04-06

## 2019-03-08 RX ORDER — BUDESONIDE, MICRONIZED 100 %
1 POWDER (GRAM) MISCELLANEOUS
Qty: 1 | Refills: 0
Start: 2019-03-08 | End: 2019-04-06

## 2019-03-08 RX ORDER — POTASSIUM PHOSPHATE, MONOBASIC POTASSIUM PHOSPHATE, DIBASIC 236; 224 MG/ML; MG/ML
15 INJECTION, SOLUTION INTRAVENOUS ONCE
Refills: 0 | Status: COMPLETED | OUTPATIENT
Start: 2019-03-08 | End: 2019-03-08

## 2019-03-08 RX ORDER — FOLIC ACID 0.8 MG
1 TABLET ORAL
Qty: 30 | Refills: 0
Start: 2019-03-08 | End: 2019-04-06

## 2019-03-08 RX ORDER — PANTOPRAZOLE SODIUM 20 MG/1
1 TABLET, DELAYED RELEASE ORAL
Qty: 30 | Refills: 0
Start: 2019-03-08 | End: 2019-04-06

## 2019-03-08 RX ORDER — OLANZAPINE 15 MG/1
1 TABLET, FILM COATED ORAL
Qty: 30 | Refills: 0
Start: 2019-03-08 | End: 2019-04-06

## 2019-03-08 RX ORDER — FERROUS SULFATE 325(65) MG
1 TABLET ORAL
Qty: 30 | Refills: 0
Start: 2019-03-08 | End: 2019-04-06

## 2019-03-08 RX ORDER — DOCUSATE SODIUM 100 MG
1 CAPSULE ORAL
Qty: 90 | Refills: 0
Start: 2019-03-08 | End: 2019-04-06

## 2019-03-08 RX ADMIN — ENOXAPARIN SODIUM 40 MILLIGRAM(S): 100 INJECTION SUBCUTANEOUS at 13:15

## 2019-03-08 RX ADMIN — LEVETIRACETAM 400 MILLIGRAM(S): 250 TABLET, FILM COATED ORAL at 06:11

## 2019-03-08 RX ADMIN — Medication 0.5 MILLIGRAM(S): at 10:55

## 2019-03-08 RX ADMIN — Medication 104 MILLIGRAM(S): at 05:28

## 2019-03-08 RX ADMIN — PIPERACILLIN AND TAZOBACTAM 25 GRAM(S): 4; .5 INJECTION, POWDER, LYOPHILIZED, FOR SOLUTION INTRAVENOUS at 19:50

## 2019-03-08 RX ADMIN — OLANZAPINE 7.5 MILLIGRAM(S): 15 TABLET, FILM COATED ORAL at 13:15

## 2019-03-08 RX ADMIN — Medication 325 MILLIGRAM(S): at 13:15

## 2019-03-08 RX ADMIN — Medication 1 SPRAY(S): at 05:30

## 2019-03-08 RX ADMIN — Medication 100 MILLIGRAM(S): at 13:14

## 2019-03-08 RX ADMIN — Medication 1 DROP(S): at 05:29

## 2019-03-08 RX ADMIN — Medication 1 TABLET(S): at 13:15

## 2019-03-08 RX ADMIN — Medication 1 MILLIGRAM(S): at 13:15

## 2019-03-08 RX ADMIN — PIPERACILLIN AND TAZOBACTAM 25 GRAM(S): 4; .5 INJECTION, POWDER, LYOPHILIZED, FOR SOLUTION INTRAVENOUS at 03:07

## 2019-03-08 RX ADMIN — Medication 1 DROP(S): at 18:10

## 2019-03-08 RX ADMIN — SODIUM CHLORIDE 75 MILLILITER(S): 9 INJECTION, SOLUTION INTRAVENOUS at 19:32

## 2019-03-08 RX ADMIN — Medication 81 MILLIGRAM(S): at 13:14

## 2019-03-08 RX ADMIN — Medication 1 MILLIGRAM(S): at 15:52

## 2019-03-08 RX ADMIN — Medication 104 MILLIGRAM(S): at 18:09

## 2019-03-08 RX ADMIN — PIPERACILLIN AND TAZOBACTAM 25 GRAM(S): 4; .5 INJECTION, POWDER, LYOPHILIZED, FOR SOLUTION INTRAVENOUS at 13:14

## 2019-03-08 RX ADMIN — POTASSIUM PHOSPHATE, MONOBASIC POTASSIUM PHOSPHATE, DIBASIC 62.5 MILLIMOLE(S): 236; 224 INJECTION, SOLUTION INTRAVENOUS at 13:14

## 2019-03-08 RX ADMIN — LEVETIRACETAM 400 MILLIGRAM(S): 250 TABLET, FILM COATED ORAL at 18:09

## 2019-03-08 NOTE — DISCHARGE NOTE PROVIDER - NSDCCPCAREPLAN_GEN_ALL_CORE_FT
PRINCIPAL DISCHARGE DIAGNOSIS  Problem: Sepsis with metabolic encephalopathy  Assessment and Plan of Treatment: Mental status has improved.        SECONDARY DISCHARGE DIAGNOSES  Problem: Acute cholecystitis  Assessment and Plan of Treatment: Take antibiotics as prescribed  Augmentin 875 mg PO BID    Problem: Swelling of right upper extremity  Assessment and Plan of Treatment: RUE Duplex-___________    Problem: Functional quadriplegia  Assessment and Plan of Treatment: Supportive care    Problem: Transaminitis  Assessment and Plan of Treatment: Stable   HIDA Scan Negative   Avoid hepatoxic medications    Problem: Hypernatremia  Assessment and Plan of Treatment: Continue to encourage PO hydration    Problem: Cerebral palsy, unspecified type  Assessment and Plan of Treatment: Supportive Care    Problem: Seizure disorder  Assessment and Plan of Treatment: Continue taking your Keppra and Dilantin PRINCIPAL DISCHARGE DIAGNOSIS  Problem: Sepsis with metabolic encephalopathy  Assessment and Plan of Treatment: Mental status has improved.        SECONDARY DISCHARGE DIAGNOSES  Problem: Acute cholecystitis  Assessment and Plan of Treatment: Take antibiotics as prescribed  Augmentin 875 mg PO BID    Problem: Swelling of right upper extremity  Assessment and Plan of Treatment: RUE Duplex-Negative for DVT    Problem: Functional quadriplegia  Assessment and Plan of Treatment: Supportive care    Problem: Transaminitis  Assessment and Plan of Treatment: Stable   HIDA Scan Negative   Avoid hepatoxic medications    Problem: Hypernatremia  Assessment and Plan of Treatment: Continue to encourage PO hydration    Problem: Cerebral palsy, unspecified type  Assessment and Plan of Treatment: Supportive Care    Problem: Seizure disorder  Assessment and Plan of Treatment: Continue taking your Keppra and Dilantin PRINCIPAL DISCHARGE DIAGNOSIS  Diagnosis: Sepsis with metabolic encephalopathy  Assessment and Plan of Treatment: Mental status has improved. Antibiotics initiated. Monitor for any further signs and symptoms of further infection, including but not limited to, fevers/chills, shortness of breath, increased heart rate, dizziness, or abrupt changes in mental status. PRINCIPAL DISCHARGE DIAGNOSIS  Diagnosis: Sepsis with metabolic encephalopathy  Assessment and Plan of Treatment: Secondary to cholecysittis. Mental status has improved. Antibiotics completed. Monitor for any further signs and symptoms of further infection, including but not limited to, fevers/chills, shortness of breath, increased heart rate, dizziness, or abrupt changes in mental status.         SECONDARY DISCHARGE DIAGNOSES  Diagnosis: Swelling of right upper extremity  Assessment and Plan of Treatment: No thrombosis on imaging - Elevate arm to decrease swelling.    Diagnosis: Functional quadriplegia  Assessment and Plan of Treatment: Supportive care    Diagnosis: Lung nodule  Assessment and Plan of Treatment: CT chest noted with 7 mm nodular density within the basilar right lower lobe with non-specific significance.  Recommend follow-up chest CT in 3 months to determine stability.    Diagnosis: Cerebral palsy, unspecified type  Assessment and Plan of Treatment: Supportive Care    Diagnosis: Seizure disorder  Assessment and Plan of Treatment: Neurology was consulted and adjusted your medications. Continue your medications as directed and please follow-up as an outpatient with your primary care provider for further care and recommendations.    Diagnosis: Acute cholecystitis  Assessment and Plan of Treatment: Finished course of antibiotics during hospitalization. Monitor for abdominal pain or fevers.

## 2019-03-08 NOTE — PROGRESS NOTE ADULT - PROBLEM SELECTOR PLAN 4
- downtrending, continue to monitor  - avoid hepatotoxic medications   - autoimmune labs, PCR pending  - HIDA scan negative for acute cholecystitis  - trend LFTs - RVP and Urine cultures negative   - CXR w/ retrocardiac opacity, but CT chest with no consolidation or effusion

## 2019-03-08 NOTE — PROGRESS NOTE BEHAVIORAL HEALTH - NSBHCHARTREVIEWLAB_PSY_A_CORE FT
Labs reviewed personally [X]                        14.8   7.68  )-----------( 354      ( 08 Mar 2019 06:25 )             47.8     Hgb Trend: 14.8<--, 13.2<--, 13.2<--, 12.9<--, 12.7<--    03-08    140  |  100  |  6<L>  ----------------------------<  97  3.9   |  22  |  0.55    Ca    9.4      08 Mar 2019 06:25  Phos  2.3     03-08  Mg     1.7     03-08    TPro  7.9  /  Alb  3.6  /  TBili  0.5  /  DBili  x   /  AST  33<H>  /  ALT  104<H>  /  AlkPhos  152<H>  03-08    Creatinine Trend: 0.55<--, 0.56<--, 0.62<--, 0.56<--, 0.52<--, 0.52<--
Labs reviewed personally [X]                        12.7   6.20  )-----------( 318      ( 04 Mar 2019 06:20 )             41.3     Hgb Trend: 12.7<--, 13.4<--, 13.8<--, 12.8<--, 13.0<--    03-04    144  |  105  |  11  ----------------------------<  83  3.8   |  26  |  0.52    Ca    8.5      04 Mar 2019 06:20    TPro  6.8  /  Alb  3.1<L>  /  TBili  0.4  /  DBili  x   /  AST  143<H>  /  ALT  295<H>  /  AlkPhos  192<H>  03-04    Creatinine Trend: 0.52<--, 0.55<--, 0.68<--, 0.61<--, 0.54<--, 0.62<--  PT/INR - ( 03 Mar 2019 06:45 )   PT: 11.3 SEC;   INR: 1.02

## 2019-03-08 NOTE — PROGRESS NOTE ADULT - PROBLEM SELECTOR PLAN 3
- RVP and Urine cultures negative   - CXR w/ retrocardiac opacity, but CT chest with no consolidation or effusion - There is some concern that agitation may be contributing to her decreased PO intake. Psych was consulted and recommended now that LFTs have improved to give Zyprexa 2.5 mg PRN ~1-1.5 hours before food to see if that would help her intake. ***DO NOT GIVE IM Zyprexa within 4hrs of IM/IV Ativan due to risk of respiratory depression and sedation.   - patient was on dysphagia 1 diet  - continue with D5 1/2 NS @ 75cc/hr until patient tolerating PO and eating  - aspiration precautions

## 2019-03-08 NOTE — PROGRESS NOTE BEHAVIORAL HEALTH - CASE SUMMARY
Patient not eating somewhat 2/2 agitation may benefit from meds for agitation before dinner as above, if working can consider standing zyprexa 2.5mg in AM approx 1h before breakfast. defer to medicine for further workup of arm swelling. no notable cogwheeling or rigidity on exam though exam limited by poor cooperation.

## 2019-03-08 NOTE — PROGRESS NOTE BEHAVIORAL HEALTH - NSBHCONSULTFOLLOWAFTERCARE_PSY_A_CORE FT
Follow up with the psychiatrist at  for medication management
Follow up with the psychiatrist at  for medication management

## 2019-03-08 NOTE — PROGRESS NOTE BEHAVIORAL HEALTH - NSBHCHARTREVIEWIMAGING_PSY_A_CORE FT
< from: CT Chest No Cont (02.28.19 @ 18:23) >    IMPRESSION:     No consolidation, or pleural effusion or significant atelectasis.   Prominent left cardiophrenic pericardial fat pad.    New 7 mm nodular density within the basilar right lower lobe (series 2   image 58) is nonspecific and may represent a focus of mucoid impaction.   Recommend follow-up chest CT in 3 months to determine stability.    Mild CBD dilatation.    < end of copied text >
< from: CT Chest No Cont (02.28.19 @ 18:23) >    IMPRESSION:     No consolidation, or pleural effusion or significant atelectasis.   Prominent left cardiophrenic pericardial fat pad.    New 7 mm nodular density within the basilar right lower lobe (series 2   image 58) is nonspecific and may represent a focus of mucoid impaction.   Recommend follow-up chest CT in 3 months to determine stability.    Mild CBD dilatation.    < end of copied text >

## 2019-03-08 NOTE — PROGRESS NOTE BEHAVIORAL HEALTH - RISK ASSESSMENT
No hx of SA known, pt non verbal, unable to amputate, domiciled in group home with frequent supervision.  Not an acute risk of harm to self or others at this time.
No hx of SA known, pt non verbal, unable to amputate, domiciled in group home with frequent supervision.  Not an acute risk of harm to self or others at this time.

## 2019-03-08 NOTE — PROGRESS NOTE ADULT - PROBLEM SELECTOR PLAN 1
-HIDA scan negative for acute cholecystitis  -c/w Zosyn IV for now for total of 10 days; can d/c on PO augmentin when ready for discharge. c/w zosyn IV day #3/10  -surgery in agreement. -will check a right UE ultrasound to r/o DVT  -keep arm elevated

## 2019-03-08 NOTE — PROGRESS NOTE BEHAVIORAL HEALTH - NSBHFUPINTERVALHXFT_PSY_A_CORE
Patient seen and evaluated at bedside. Patient has been intermittently agitated. Patient has not been eating during hospitalization for unclear reasons. Her mother believes that patient will eat well after return to the group home, and declines feeding tube at this time. At bedside, she appears to be at her baseline, nonverbal. Not following directions. Able to track providers and make eye contact, not acutely agitated.
Patient seen and evaluated, patient asleep, and difficult to wake up after PRN Ativan this morning @ 530 am. Over the weekend, she was uncooperative with US with PRN Ativan 1 mg IV, so plan to pretreat with Ativan 2 mg prior to EUS today. Patient is nonverbal and nonambulatory at baseline, consistent with her presentation today. Though patient has been uncooperative with procedures, she appears to be otherwise at her baseline behaviorally.

## 2019-03-08 NOTE — DIETITIAN INITIAL EVALUATION ADULT. - NS AS NUTRI INTERV MEDICAL AND FOOD SUPPLEMENTS
Recommend Ensure Enlive 240mls 2x daily (700kcal, 40g protein). + Ensure Pudding 2x daily (340 kcals, 8g protein).

## 2019-03-08 NOTE — DISCHARGE NOTE PROVIDER - HOSPITAL COURSE
54 yo woman w/ PMH of CP(non verbal, does not ambulate), intractable [facility]             Hospital Course    Metabolic Encephalopathy 2/2 UTI -Supportive therapy. Monitor mentation.     - nonverbal at baseline ,CP history, noted to be more agitated at [facility]     - s/p tx withcourse of Keflex         Transaminitis r/o Liver or GB Pathology     - CT CHEST with CBD Dilation     - ABD US attempted twice with Ativan and patient refused.     - ABD (3rd attempt) noted with Cholelithiasis, no dilation     - GI consulted and Family refused MRCP at this time.     - EUS with Cholelithiasis and Thickened GB Wall. No Choledocolithiasis.     - Surgery: prefer to treat with antibiotics. If develops a fever or elevated WBC, would consider percutaneous cholecystostomy    -HIDA scan negative 3/6    - zosyn for 10 days total , if ready for d/c switch to augmentin- as per surgery             Hypernatremia second to Dehydration     - IV Hydration given and Na improved.         Seizures/ CP -- Supportive Care      - Keppra and Phenytoin     -  Cogentin d/cd. Continue on Zyprexa 7.5 PO QHS         Refusing Medications     - Keppra and Dilantin ordered as IV second to patient spitting out medications.             Dispo: Back to group home 54 y/o F w/ PMH of CP(non verbal, does not ambulate), intractable seizures [facility]         COURSE_______            Dispo: Back to group home 52 yo F with CP (non verbal, does not ambulate), intractable seizures, [facility] course c/b transaminitis and RUQ pain s/p EUS on 3/4/19 with concern for mild acute cholecystitis. s/p HIDA scan with no evidence of acute cholecystitis. seen by surgery with no plan for surgical intervention. completed course of abx with resolution of WBC elevation. transaminitis also resolving and remains afebrile--recently increased seizures s/p AED change.          Seizure disorder    Patient with intermittent seizures    - c/w Keppra 1000 mg BID    - c/w vimpat 100 mg BID    - concern about consistent taking of meds as refuses PO intermittently however per group home this is not new and re-attempts need to be made throughout day    - missing meds places her at risk for seizures         Functional quadriplegia     c/w full care--turns, assistance with meals         Cerebral palsy    - supportive care    - c/w seizure meds         Dysphagia    - dysphagia diet    - assistance with meals    - per prior discussions, mother not interested in PEG and currently not needed would need OPWDD        Lung nodule    - repeat CT chest in 3 months         Dispo: Back to group home 52 yo F with CP (non verbal, does not ambulate), intractable seizures, [facility] c/b transaminitis and RUQ pain s/p EUS on 3/4/19 with concern for mild acute cholecystitis. s/p HIDA scan with no evidence of acute cholecystitis. seen by surgery with no plan for surgical intervention. completed course of abx with resolution of WBC elevation. transaminitis also resolving and remains afebrile--recently increased seizures s/p AED change.          Seizure disorder    Patient with intermittent seizures    - c/w Keppra 1000 mg BID    - c/w vimpat 100 mg BID    - concern about consistent taking of meds as refuses PO intermittently however per group home this is not new and re-attempts need to be made throughout day    - missing meds places her at risk for seizures         Functional quadriplegia     c/w full care--turns, assistance with meals         Cerebral palsy    - supportive care    - c/w seizure meds         Dysphagia    - dysphagia diet    - assistance with meals    - per prior discussions, mother not interested in PEG and currently not needed would need OPWDD        Lung nodule    - repeat CT chest in 3 months         Dispo: Back to group home        istop Reference #: [id number]

## 2019-03-08 NOTE — PROGRESS NOTE ADULT - PROBLEM SELECTOR PLAN 2
- patient was on dysphagia 1 diet ; now NPO until further notice  - will order D5 1/2 NS @ 75cc/hr   - aspiration precautions  - refusing speech and swallow; will consult palliative care to discuss GOC and PEG if patient continues to refuse to eat -HIDA scan negative for acute cholecystitis  -c/w zosyn IV day #4/10 for total of 10 days  -can d/c on augmentin 875mg PO BID to complete 10 days when ready for discharge  -surgery in agreement

## 2019-03-08 NOTE — PROGRESS NOTE BEHAVIORAL HEALTH - OTHER
unable to assess, non verbal at baseline non-verbal unable to assess Non verbal unable to participate 2/2 disability non-verbal at baseline

## 2019-03-08 NOTE — PROGRESS NOTE BEHAVIORAL HEALTH - PRIMARY DX
Intellectual disability with other impairment of behaviour
Intellectual disability with other impairment of behaviour

## 2019-03-08 NOTE — DIETITIAN INITIAL EVALUATION ADULT. - OTHER INFO
Patient seen for extended length of stay. Per chart: Patient with history of CP (non verbal does not ambulate) intractable seizures presenting from [facility] with encephalopathy. Per RN - patient with poor PO intake during hospital course- consuming less than 50% of estimated nutrient needs for about 9 days. RN reported patient would slap food out of people's hand. Patient previously NPO for 1 day. Diet advanced to Soft + FIber Restricted + DASH + Low fat.. Per Palliative note- family declining tube feedings. RN reports no GI distress. No allergies or intolerances noted in chart. Per previous RDN noted (10/2018) weight: 183 pounds. Admission weight (2/27): 164 pounds. RDN obtained weight via bed scale: 151 pounds. Weight trend noticed---> suspected decrease in PO PTA + suboptimal PO during hospital course x 9 days. Patient seen for extended length of stay. Per chart: Patient with history of CP (non verbal does not ambulate) intractable seizures presenting from [facility] with encephalopathy. Per RN - patient with poor PO intake during hospital course- consuming less than 50% of estimated nutrient needs for about 9 days. RN reported patient would slap food out of people's hand. Patient previously NPO for 1 day. Patient seen by Speech on 3/7- recommended non-oral means of nutrition secondary to refusal. Diet advanced (3/8) to Soft + FIber Restricted + DASH + Low fat.. Per Palliative note- family declining tube feedings. RN reports no GI distress. No allergies or intolerances noted in chart. Per previous RDN noted (10/2018) weight: 183 pounds. Admission weight (2/27): 164 pounds. RDN obtained weight via bed scale: 151 pounds. Weight trend noticed---> suspected decrease in PO PTA + suboptimal PO during hospital course x 9 days.

## 2019-03-08 NOTE — PROGRESS NOTE BEHAVIORAL HEALTH - SUMMARY
52 yo F with cerebral palsy (nonverbal, does not ambulate), PMH seizure disorder, PPH agitation on Zyprexa, brought in from group home for AMS characterized by 1 week of increased agitation, aggression, scratching, and not taking medications. Upon admission, patient has been intermittently agitated, making workup difficult. CT chest found CBD dilitation, but further workup with US was not done as patient could not comply with test 2/2 agitation. Since admission, patient's LFTs have greatly increased despite no change in her medication regimen. Though she has been uncooperative with diagnostic examinations, she otherwise appears to be at her baseline behaviorally.    Patient tolerating Zyprexa appropriately. There is some concern that agitation may be contributing to her decreased PO intake. Now that LFTs have trended down, can give Zyprexa 2.5 mg PRN ~1-1.5 hours before food to see if that would help her intake.     PLAN:  1. continue Zyprexa 7.5 mg PO AT BEDTIME   2. PRNs: Zyprexa 2.5 mg PO/IM q6h for agitation  3. case discussed with team, will continue to follow      ***DO NOT GIVE IM Zyprexa within 4hrs of IM/IV Ativan due to risk of respiratory depression and sedation. 52 yo F with cerebral palsy (nonverbal, does not ambulate), PMH seizure disorder, PPH agitation on Zyprexa, brought in from group home for AMS characterized by 1 week of increased agitation, aggression, scratching, and not taking medications. Upon admission, patient has been intermittently agitated, making workup difficult. CT chest found CBD dilitation, but further workup with US was not done as patient could not comply with test 2/2 agitation. Since admission, patient's LFTs have greatly increased despite no change in her medication regimen. Though she has been uncooperative with diagnostic examinations, she otherwise appears to be at her baseline behaviorally.    Patient tolerating Zyprexa appropriately. There is some concern that agitation may be contributing to her decreased PO intake. Now that LFTs have trended down, can give Zyprexa 2.5 mg PRN ~1-1.5 hours before food to see if that would help her intake.     PLAN:  1. continue Zyprexa 7.5 mg PO AT BEDTIME  2. PRNs: Zyprexa 2.5 mg PO/IM q6h for agitation  3. case discussed with team, will continue to follow      ***DO NOT GIVE IM Zyprexa within 4hrs of IM/IV Ativan due to risk of respiratory depression and sedation.

## 2019-03-08 NOTE — PROGRESS NOTE ADULT - SUBJECTIVE AND OBJECTIVE BOX
CHIEF COMPLAINT: Patient is a 53y old  female who presents with a chief complaint of CP encephalopathy, transaminitis (05 Mar 2019 10:01)    SUBJECTIVE / OVERNIGHT EVENTS: Patient seen and examined. No acute events overnight. Patient less aggressive than yesterday.    MEDICATIONS  (STANDING):  artificial  tears Solution 1 Drop(s) Both EYES two times a day  aspirin  chewable 81 milliGRAM(s) Oral daily  buDESOnide   0.5 milliGRAM(s) Respule 0.5 milliGRAM(s) Inhalation daily  dextrose 5% + sodium chloride 0.45%. 1000 milliLiter(s) (75 mL/Hr) IV Continuous <Continuous>  docusate sodium 100 milliGRAM(s) Oral three times a day  enoxaparin Injectable 40 milliGRAM(s) SubCutaneous every 24 hours  ferrous    sulfate 325 milliGRAM(s) Oral daily  fluticasone propionate 50 MICROgram(s)/spray Nasal Spray 1 Spray(s) Both Nostrils two times a day  folic acid 1 milliGRAM(s) Oral daily  levETIRAcetam  IVPB 1000 milliGRAM(s) IV Intermittent every 12 hours  multivitamin 1 Tablet(s) Oral daily  OLANZapine 7.5 milliGRAM(s) Oral daily  pantoprazole    Tablet 40 milliGRAM(s) Oral before breakfast  phenytoin  IVPB 100 milliGRAM(s) IV Intermittent two times a day  piperacillin/tazobactam IVPB. 3.375 Gram(s) IV Intermittent every 8 hours  potassium phosphate IVPB 15 milliMole(s) IV Intermittent once    MEDICATIONS  (PRN):  ALBUTerol/ipratropium for Nebulization 3 milliLiter(s) Nebulizer every 6 hours PRN Shortness of Breath and/or Wheezing  LORazepam     Tablet 1 milliGRAM(s) Oral every 6 hours PRN Agitation  LORazepam   Injectable 1 milliGRAM(s) IV Push every 6 hours PRN Agitation      VITALS:  T(F): 98.3 (03-08-19 @ 05:26), Max: 98.3 (03-08-19 @ 05:26)  HR: 179 (03-08-19 @ 10:55) (78 - 179)  BP: 156/85 (03-08-19 @ 05:26) (139/85 - 156/85)  RR: 18 (03-08-19 @ 05:26) (18 - 18)  SpO2: 98% (03-08-19 @ 10:55)      PHYSICAL EXAM:  GENERAL: well-developed  CHEST/LUNG: Clear to auscultation bilaterally; No wheeze  HEART: Regular rate and rhythm; No murmurs, rubs, or gallops  ABDOMEN: soft NT/ND; Bowel sounds present  EXTREMITIES:  RUE swollen; 2+ Peripheral Pulses      LABS:              14.8                 140  | 22   | 6            7.68  >-----------< 354     ------------------------< 97                    47.8                 3.9  | 100  | 0.55                                         Ca 9.4   Mg 1.7   Ph 2.3         TPro  7.9  /  Alb  3.6      TBili  0.5  /  DBili  x         AST  33  /  ALT  104            AlkPhos  152      [ ] Consultant(s) Notes Reviewed:  [x] Care Discussed with Consultants/Other Providers: ADS NP - discussed obtaining RUE US for increased right arm swelling CHIEF COMPLAINT: Patient is a 53y old  female who presents with a chief complaint of CP encephalopathy, transaminitis (05 Mar 2019 10:01)    SUBJECTIVE / OVERNIGHT EVENTS: Patient seen and examined. No acute events overnight. Patient less aggressive than yesterday. Patient has been refusing to eat yesterday.     MEDICATIONS  (STANDING):  artificial  tears Solution 1 Drop(s) Both EYES two times a day  aspirin  chewable 81 milliGRAM(s) Oral daily  buDESOnide   0.5 milliGRAM(s) Respule 0.5 milliGRAM(s) Inhalation daily  dextrose 5% + sodium chloride 0.45%. 1000 milliLiter(s) (75 mL/Hr) IV Continuous <Continuous>  docusate sodium 100 milliGRAM(s) Oral three times a day  enoxaparin Injectable 40 milliGRAM(s) SubCutaneous every 24 hours  ferrous    sulfate 325 milliGRAM(s) Oral daily  fluticasone propionate 50 MICROgram(s)/spray Nasal Spray 1 Spray(s) Both Nostrils two times a day  folic acid 1 milliGRAM(s) Oral daily  levETIRAcetam  IVPB 1000 milliGRAM(s) IV Intermittent every 12 hours  multivitamin 1 Tablet(s) Oral daily  OLANZapine 7.5 milliGRAM(s) Oral daily  pantoprazole    Tablet 40 milliGRAM(s) Oral before breakfast  phenytoin  IVPB 100 milliGRAM(s) IV Intermittent two times a day  piperacillin/tazobactam IVPB. 3.375 Gram(s) IV Intermittent every 8 hours  potassium phosphate IVPB 15 milliMole(s) IV Intermittent once    MEDICATIONS  (PRN):  ALBUTerol/ipratropium for Nebulization 3 milliLiter(s) Nebulizer every 6 hours PRN Shortness of Breath and/or Wheezing  LORazepam     Tablet 1 milliGRAM(s) Oral every 6 hours PRN Agitation  LORazepam   Injectable 1 milliGRAM(s) IV Push every 6 hours PRN Agitation      VITALS:  T(F): 98.3 (03-08-19 @ 05:26), Max: 98.3 (03-08-19 @ 05:26)  HR: 179 (03-08-19 @ 10:55) (78 - 179)  BP: 156/85 (03-08-19 @ 05:26) (139/85 - 156/85)  RR: 18 (03-08-19 @ 05:26) (18 - 18)  SpO2: 98% (03-08-19 @ 10:55)      PHYSICAL EXAM:  GENERAL: well-developed  CHEST/LUNG: Clear to auscultation bilaterally; No wheeze  HEART: Regular rate and rhythm; No murmurs, rubs, or gallops  ABDOMEN: soft NT/ND; Bowel sounds present  EXTREMITIES:  RUE swollen, warm and tender to touch; 2+ Peripheral Pulses      LABS:              14.8                 140  | 22   | 6            7.68  >-----------< 354     ------------------------< 97                    47.8                 3.9  | 100  | 0.55                                         Ca 9.4   Mg 1.7   Ph 2.3         TPro  7.9  /  Alb  3.6      TBili  0.5  /  DBili  x         AST  33  /  ALT  104            AlkPhos  152      [ ] Consultant(s) Notes Reviewed:  [x] Care Discussed with Consultants/Other Providers: ADS NP - discussed obtaining RUE US for increased right arm swelling

## 2019-03-08 NOTE — PROGRESS NOTE ADULT - ATTENDING COMMENTS
11. Dispo:  -awaiting palliative care consult to discuss GOC (PEG or no PEG) prior to discharge back to [facility] 11. Dispo:  -if tolerating PO and RUE negative for DVT, can d/c back to group home.

## 2019-03-08 NOTE — DIETITIAN INITIAL EVALUATION ADULT. - PT NOT SOURCE
Patient with intellectual disability - unable to participate during interview. No family at bedside.

## 2019-03-08 NOTE — DISCHARGE NOTE PROVIDER - CARE PROVIDER_API CALL
[facility] medicine clinic,   Phone: [phone]  Fax: (   )    -  Follow Up Time:

## 2019-03-08 NOTE — DISCHARGE NOTE PROVIDER - NSDCCPTREATMENT_GEN_ALL_CORE_FT
PRINCIPAL PROCEDURE  Procedure: HIDA scan  Findings and Treatment: Normal hepatobiliary scan. No radionuclide evidence of acute cholecystitis.

## 2019-03-08 NOTE — DIETITIAN INITIAL EVALUATION ADULT. - PERTINENT LABORATORY DATA
03-08 Na140 mmol/L Glu 97 mg/dL K+ 3.9 mmol/L Cr  0.55 mg/dL BUN 6 mg/dL<L> 03-08 Phos 2.3 mg/dL<L> 03-08 Alb 3.6 g/dL

## 2019-03-08 NOTE — PROGRESS NOTE ADULT - PROBLEM SELECTOR PLAN 5
-resolved - downtrending, continue to monitor  - avoid hepatotoxic medications   - HIDA scan negative for acute cholecystitis

## 2019-03-08 NOTE — DIETITIAN INITIAL EVALUATION ADULT. - ENERGY NEEDS
Ht: previous RDN noted 63 inches-- 60 inches during this admission. ? accuracy   Wt: 151 pounds  BMI: 29.4 ( based on 60 inches) kg/m2 IBW: 100 pounds (+/-10%) %[id number]%  Edema: nonpitting edema- left and right ankle.  Skin: intact, no pressure injuries noted

## 2019-03-08 NOTE — DIETITIAN INITIAL EVALUATION ADULT. - PERTINENT MEDS FT
MEDICATIONS  (STANDING):  artificial  tears Solution 1 Drop(s) Both EYES two times a day  aspirin  chewable 81 milliGRAM(s) Oral daily  buDESOnide   0.5 milliGRAM(s) Respule 0.5 milliGRAM(s) Inhalation daily  dextrose 5% + sodium chloride 0.45%. 1000 milliLiter(s) (75 mL/Hr) IV Continuous <Continuous>  docusate sodium 100 milliGRAM(s) Oral three times a day  enoxaparin Injectable 40 milliGRAM(s) SubCutaneous every 24 hours  ferrous    sulfate 325 milliGRAM(s) Oral daily  fluticasone propionate 50 MICROgram(s)/spray Nasal Spray 1 Spray(s) Both Nostrils two times a day  folic acid 1 milliGRAM(s) Oral daily  levETIRAcetam  IVPB 1000 milliGRAM(s) IV Intermittent every 12 hours  multivitamin 1 Tablet(s) Oral daily  OLANZapine 7.5 milliGRAM(s) Oral daily  pantoprazole    Tablet 40 milliGRAM(s) Oral before breakfast  phenytoin  IVPB 100 milliGRAM(s) IV Intermittent two times a day  piperacillin/tazobactam IVPB. 3.375 Gram(s) IV Intermittent every 8 hours    MEDICATIONS  (PRN):  ALBUTerol/ipratropium for Nebulization 3 milliLiter(s) Nebulizer every 6 hours PRN Shortness of Breath and/or Wheezing  LORazepam     Tablet 1 milliGRAM(s) Oral every 6 hours PRN Agitation  LORazepam   Injectable 1 milliGRAM(s) IV Push every 6 hours PRN Agitation

## 2019-03-08 NOTE — PROGRESS NOTE BEHAVIORAL HEALTH - NSBHCHARTREVIEWVS_PSY_A_CORE FT
Vital Signs Last 24 Hrs  T(C): 36.8 (08 Mar 2019 05:26), Max: 36.8 (08 Mar 2019 05:26)  T(F): 98.3 (08 Mar 2019 05:26), Max: 98.3 (08 Mar 2019 05:26)  HR: 179 (08 Mar 2019 10:55) (78 - 179)  BP: 156/85 (08 Mar 2019 05:26) (139/85 - 156/85)  RR: 18 (08 Mar 2019 05:26) (18 - 18)  SpO2: 98% (08 Mar 2019 10:55) (97% - 98%)
Vital Signs Last 24 Hrs  T(C): 36.8 (04 Mar 2019 05:17), Max: 36.8 (04 Mar 2019 05:17)  T(F): 98.2 (04 Mar 2019 05:17), Max: 98.2 (04 Mar 2019 05:17)  HR: 91 (04 Mar 2019 05:17) (81 - 91)  BP: 124/70 (04 Mar 2019 05:17) (124/70 - 144/83)  RR: 18 (04 Mar 2019 05:17) (17 - 18)  SpO2: 94% (04 Mar 2019 05:17) (94% - 97%)

## 2019-03-08 NOTE — CHART NOTE - NSCHARTNOTEFT_GEN_A_CORE
NUTRITION SERVICES                                                                                  MALNUTRITION ALERT     Attention Health Care Provider: Upon nutritional assessment by the Registered Dietitian your patient was determined to meet criteria / has evidence of the following diagnosis/diagnoses:    [ ] Mild Protein Calorie Malnutrition   [ ] Moderate Protein Calorie Malnutrition   x ] Severe Protein Calorie Malnutrition   [ ] Unspecified Protein Calorie Malnutrition   [ ] Underweight / BMI <19  [ ] Morbid Obesity / BMI >40          PLAN OF CARE: Refer to Initial Dietitian Evaluation or Nutrition Follow-Up Documentation for Nutritional Recommendations.

## 2019-03-09 PROCEDURE — 99233 SBSQ HOSP IP/OBS HIGH 50: CPT

## 2019-03-09 RX ADMIN — Medication 0.5 MILLIGRAM(S): at 11:51

## 2019-03-09 RX ADMIN — PIPERACILLIN AND TAZOBACTAM 25 GRAM(S): 4; .5 INJECTION, POWDER, LYOPHILIZED, FOR SOLUTION INTRAVENOUS at 21:03

## 2019-03-09 RX ADMIN — ENOXAPARIN SODIUM 40 MILLIGRAM(S): 100 INJECTION SUBCUTANEOUS at 13:23

## 2019-03-09 RX ADMIN — PIPERACILLIN AND TAZOBACTAM 25 GRAM(S): 4; .5 INJECTION, POWDER, LYOPHILIZED, FOR SOLUTION INTRAVENOUS at 03:40

## 2019-03-09 RX ADMIN — SODIUM CHLORIDE 75 MILLILITER(S): 9 INJECTION, SOLUTION INTRAVENOUS at 09:46

## 2019-03-09 RX ADMIN — PIPERACILLIN AND TAZOBACTAM 25 GRAM(S): 4; .5 INJECTION, POWDER, LYOPHILIZED, FOR SOLUTION INTRAVENOUS at 14:38

## 2019-03-09 RX ADMIN — Medication 100 MILLIGRAM(S): at 05:52

## 2019-03-09 RX ADMIN — Medication 1 DROP(S): at 05:51

## 2019-03-09 RX ADMIN — Medication 1 SPRAY(S): at 17:48

## 2019-03-09 RX ADMIN — Medication 1 DROP(S): at 17:49

## 2019-03-09 RX ADMIN — PANTOPRAZOLE SODIUM 40 MILLIGRAM(S): 20 TABLET, DELAYED RELEASE ORAL at 05:52

## 2019-03-09 RX ADMIN — LEVETIRACETAM 400 MILLIGRAM(S): 250 TABLET, FILM COATED ORAL at 17:49

## 2019-03-09 RX ADMIN — Medication 104 MILLIGRAM(S): at 18:38

## 2019-03-09 RX ADMIN — Medication 104 MILLIGRAM(S): at 05:51

## 2019-03-09 RX ADMIN — Medication 1 SPRAY(S): at 05:51

## 2019-03-09 RX ADMIN — LEVETIRACETAM 400 MILLIGRAM(S): 250 TABLET, FILM COATED ORAL at 05:52

## 2019-03-09 NOTE — PROGRESS NOTE ADULT - SUBJECTIVE AND OBJECTIVE BOX
Patient is a 53y old  Female who presents with a chief complaint of CP encephalopathy, transaminitis (05 Mar 2019 10:01)      SUBJECTIVE / OVERNIGHT EVENTS: No acute events overnight. ROS unattainable given cognitive impairment.    MEDICATIONS  (STANDING):  artificial  tears Solution 1 Drop(s) Both EYES two times a day  aspirin  chewable 81 milliGRAM(s) Oral daily  buDESOnide   0.5 milliGRAM(s) Respule 0.5 milliGRAM(s) Inhalation daily  dextrose 5% + sodium chloride 0.45%. 1000 milliLiter(s) (75 mL/Hr) IV Continuous <Continuous>  docusate sodium 100 milliGRAM(s) Oral three times a day  enoxaparin Injectable 40 milliGRAM(s) SubCutaneous every 24 hours  ferrous    sulfate 325 milliGRAM(s) Oral daily  fluticasone propionate 50 MICROgram(s)/spray Nasal Spray 1 Spray(s) Both Nostrils two times a day  folic acid 1 milliGRAM(s) Oral daily  levETIRAcetam  IVPB 1000 milliGRAM(s) IV Intermittent every 12 hours  multivitamin 1 Tablet(s) Oral daily  OLANZapine 7.5 milliGRAM(s) Oral daily  pantoprazole    Tablet 40 milliGRAM(s) Oral before breakfast  phenytoin  IVPB 100 milliGRAM(s) IV Intermittent two times a day  piperacillin/tazobactam IVPB. 3.375 Gram(s) IV Intermittent every 8 hours    MEDICATIONS  (PRN):  ALBUTerol/ipratropium for Nebulization 3 milliLiter(s) Nebulizer every 6 hours PRN Shortness of Breath and/or Wheezing  LORazepam     Tablet 1 milliGRAM(s) Oral every 6 hours PRN Agitation  LORazepam   Injectable 1 milliGRAM(s) IV Push every 6 hours PRN Agitation      T(C): 36.3 (03-08-19 @ 20:39), Max: 36.3 (03-08-19 @ 20:39)  HR: 72 (03-08-19 @ 20:39) (72 - 72)  BP: 110/65 (03-08-19 @ 20:39) (110/65 - 110/65)  RR: 18 (03-08-19 @ 20:39) (18 - 18)  SpO2: 96% (03-08-19 @ 20:39) (96% - 96%)  CAPILLARY BLOOD GLUCOSE    I&O's Summary    PHYSICAL EXAM:  GENERAL: no distress but attempts to push away provider when attempting to examine  CHEST/LUNG: limited exam but Clear to auscultation bilaterally; No wheeze  HEART: s1/s2, no murmurs appreciated  ABDOMEN: soft, NT/ND; Bowel sounds present  EXTREMITIES:  no appreciable RUE swelling or warmth  PSYCH: agitated when examining    LABS:                        14.8   7.68  )-----------( 354      ( 08 Mar 2019 06:25 )             47.8     03-08    140  |  100  |  6<L>  ----------------------------<  97  3.9   |  22  |  0.55    Ca    9.4      08 Mar 2019 06:25  Phos  2.3     03-08  Mg     1.7     03-08    TPro  7.9  /  Alb  3.6  /  TBili  0.5  /  DBili  x   /  AST  33<H>  /  ALT  104<H>  /  AlkPhos  152<H>  03-08              RADIOLOGY & ADDITIONAL TESTS:

## 2019-03-09 NOTE — PROGRESS NOTE ADULT - PROBLEM SELECTOR PLAN 2
-HIDA scan negative for acute cholecystitis  -c/w zosyn IV day #5/10 for total of 10 days  -can d/c on augmentin 875mg PO BID to complete 10 days when ready for discharge  -surgery in agreement

## 2019-03-09 NOTE — PROGRESS NOTE ADULT - PROBLEM SELECTOR PLAN 3
agitation may be contributing to her decreased PO intake.   Zyprexa 2.5 mg PRN ~1-1.5 hours before food to see if that would help her intake. ***DO NOT GIVE IM Zyprexa within 4hrs of IM/IV Ativan due to risk of respiratory depression and sedation.   - patient was on dysphagia 1 diet  - continue with D5 1/2 NS @ 75cc/hr until patient tolerating PO and eating  - aspiration precautions

## 2019-03-09 NOTE — PROGRESS NOTE ADULT - PROBLEM SELECTOR PLAN 5
- downtrending, continue to monitor  - avoid hepatotoxic medications   - HIDA scan negative for acute cholecystitis

## 2019-03-10 LAB
ANION GAP SERPL CALC-SCNC: 14 MMO/L — SIGNIFICANT CHANGE UP (ref 7–14)
BUN SERPL-MCNC: 4 MG/DL — LOW (ref 7–23)
CALCIUM SERPL-MCNC: 9.1 MG/DL — SIGNIFICANT CHANGE UP (ref 8.4–10.5)
CHLORIDE SERPL-SCNC: 105 MMOL/L — SIGNIFICANT CHANGE UP (ref 98–107)
CO2 SERPL-SCNC: 26 MMOL/L — SIGNIFICANT CHANGE UP (ref 22–31)
CREAT SERPL-MCNC: 0.62 MG/DL — SIGNIFICANT CHANGE UP (ref 0.5–1.3)
GLUCOSE SERPL-MCNC: 86 MG/DL — SIGNIFICANT CHANGE UP (ref 70–99)
PHENYTOIN FREE SERPL-MCNC: 3.2 UG/ML — LOW (ref 10–20)
PHENYTOIN FREE SERPL-MCNC: 4.3 UG/ML — LOW (ref 10–20)
PHOSPHATE SERPL-MCNC: 2.2 MG/DL — LOW (ref 2.5–4.5)
POTASSIUM SERPL-MCNC: 3.5 MMOL/L — SIGNIFICANT CHANGE UP (ref 3.5–5.3)
POTASSIUM SERPL-SCNC: 3.5 MMOL/L — SIGNIFICANT CHANGE UP (ref 3.5–5.3)
SODIUM SERPL-SCNC: 145 MMOL/L — SIGNIFICANT CHANGE UP (ref 135–145)

## 2019-03-10 PROCEDURE — 99233 SBSQ HOSP IP/OBS HIGH 50: CPT

## 2019-03-10 RX ADMIN — PIPERACILLIN AND TAZOBACTAM 25 GRAM(S): 4; .5 INJECTION, POWDER, LYOPHILIZED, FOR SOLUTION INTRAVENOUS at 13:24

## 2019-03-10 RX ADMIN — Medication 104 MILLIGRAM(S): at 05:19

## 2019-03-10 RX ADMIN — Medication 108 MILLIGRAM(S): at 18:04

## 2019-03-10 RX ADMIN — Medication 100 MILLIGRAM(S): at 13:04

## 2019-03-10 RX ADMIN — Medication 100 MILLIGRAM(S): at 05:19

## 2019-03-10 RX ADMIN — OLANZAPINE 7.5 MILLIGRAM(S): 15 TABLET, FILM COATED ORAL at 13:04

## 2019-03-10 RX ADMIN — ENOXAPARIN SODIUM 40 MILLIGRAM(S): 100 INJECTION SUBCUTANEOUS at 13:04

## 2019-03-10 RX ADMIN — Medication 1 SPRAY(S): at 18:06

## 2019-03-10 RX ADMIN — Medication 1 TABLET(S): at 13:04

## 2019-03-10 RX ADMIN — PIPERACILLIN AND TAZOBACTAM 25 GRAM(S): 4; .5 INJECTION, POWDER, LYOPHILIZED, FOR SOLUTION INTRAVENOUS at 03:24

## 2019-03-10 RX ADMIN — Medication 325 MILLIGRAM(S): at 13:03

## 2019-03-10 RX ADMIN — Medication 1 DROP(S): at 18:06

## 2019-03-10 RX ADMIN — PANTOPRAZOLE SODIUM 40 MILLIGRAM(S): 20 TABLET, DELAYED RELEASE ORAL at 05:19

## 2019-03-10 RX ADMIN — PIPERACILLIN AND TAZOBACTAM 25 GRAM(S): 4; .5 INJECTION, POWDER, LYOPHILIZED, FOR SOLUTION INTRAVENOUS at 19:47

## 2019-03-10 RX ADMIN — LEVETIRACETAM 400 MILLIGRAM(S): 250 TABLET, FILM COATED ORAL at 18:47

## 2019-03-10 RX ADMIN — Medication 1 DROP(S): at 05:18

## 2019-03-10 RX ADMIN — Medication 1 MILLIGRAM(S): at 13:04

## 2019-03-10 RX ADMIN — Medication 81 MILLIGRAM(S): at 13:03

## 2019-03-10 RX ADMIN — Medication 1 SPRAY(S): at 05:18

## 2019-03-10 RX ADMIN — LEVETIRACETAM 400 MILLIGRAM(S): 250 TABLET, FILM COATED ORAL at 05:18

## 2019-03-10 NOTE — CHART NOTE - NSCHARTNOTEFT_GEN_A_CORE
Called by RN for 15 second witnessed seizure activity. Pt asymptomatic, given pt's neuro status unable to determine effects of seizure. Pt appeared comfortable. VSS.     Vital Signs Last 24 Hrs  T(C): 37.1 (09 Mar 2019 22:25), Max: 37.1 (09 Mar 2019 22:25)  T(F): 98.7 (09 Mar 2019 22:25), Max: 98.7 (09 Mar 2019 22:25)  HR: 100 (09 Mar 2019 22:25) (84 - 100)  BP: 105/64 (09 Mar 2019 22:25) (105/64 - 133/75)  BP(mean): --  RR: 18 (09 Mar 2019 22:25) (17 - 18)  SpO2: 96% (09 Mar 2019 22:25) (95% - 98%)    -pt afebrile  -continue dilantin  -no need for ativan since seizure self resolved  -will continue to monitor

## 2019-03-10 NOTE — CONSULT NOTE ADULT - ATTENDING COMMENTS
Patient seen with neurology team and above note reviewed in detail and I agree with assessment and plan as outlined. Patient is nonverbal at baseline and does not follow simple requests. When an attempt is made to examine her she will push the examiners arm away.  She did have 2 reported seizures last night.  Her dilantin was increased to 300mg daily now and her level remains low. Will repeat tomorrow again will likely try another agent as dilantin is interacting with the zoszyn.  Continue keppra 1000mg BID and seizure precautions and continue medical mgt and supportive care.   If no contraindication may use ativan 1-2 mg for active seizures lasting greater than 3 minutes.

## 2019-03-10 NOTE — PROGRESS NOTE ADULT - SUBJECTIVE AND OBJECTIVE BOX
Patient is a 53y old  Female who presents with a chief complaint of CP encephalopathy, transaminitis (05 Mar 2019 10:01)      SUBJECTIVE / OVERNIGHT EVENTS: Had 2-3 episodes of seizure like activity lasting 13-15 sec that self resolved. No vomiting, diarrhea reported by RN. ROS unattainable given cognitive impairment and nonverbal.       MEDICATIONS  (STANDING):  artificial  tears Solution 1 Drop(s) Both EYES two times a day  aspirin  chewable 81 milliGRAM(s) Oral daily  buDESOnide   0.5 milliGRAM(s) Respule 0.5 milliGRAM(s) Inhalation daily  dextrose 5% + sodium chloride 0.45%. 1000 milliLiter(s) (75 mL/Hr) IV Continuous <Continuous>  docusate sodium 100 milliGRAM(s) Oral three times a day  enoxaparin Injectable 40 milliGRAM(s) SubCutaneous every 24 hours  ferrous    sulfate 325 milliGRAM(s) Oral daily  fluticasone propionate 50 MICROgram(s)/spray Nasal Spray 1 Spray(s) Both Nostrils two times a day  folic acid 1 milliGRAM(s) Oral daily  levETIRAcetam  IVPB 1000 milliGRAM(s) IV Intermittent every 12 hours  multivitamin 1 Tablet(s) Oral daily  OLANZapine 7.5 milliGRAM(s) Oral daily  pantoprazole    Tablet 40 milliGRAM(s) Oral before breakfast  phenytoin  IVPB 100 milliGRAM(s) IV Intermittent two times a day  piperacillin/tazobactam IVPB. 3.375 Gram(s) IV Intermittent every 8 hours    MEDICATIONS  (PRN):  ALBUTerol/ipratropium for Nebulization 3 milliLiter(s) Nebulizer every 6 hours PRN Shortness of Breath and/or Wheezing  LORazepam     Tablet 1 milliGRAM(s) Oral every 6 hours PRN Agitation  LORazepam   Injectable 1 milliGRAM(s) IV Push every 6 hours PRN Agitation      T(C): 37.3 (03-10-19 @ 08:21), Max: 37.4 (03-10-19 @ 08:20)  HR: 91 (03-10-19 @ 08:20) (84 - 100)  BP: 126/73 (03-10-19 @ 08:20) (105/64 - 133/75)  RR: 18 (03-10-19 @ 08:20) (17 - 18)  SpO2: 99% (03-10-19 @ 08:20) (95% - 99%)  CAPILLARY BLOOD GLUCOSE        I&O's Summary    PHYSICAL EXAM:  GENERAL: no distress but attempts to push away provider when attempting to examine  CHEST/LUNG: limited exam but Clear to auscultation bilaterally; No wheeze  HEART: s1/s2, no murmurs appreciated  ABDOMEN: soft, NT/ND; Bowel sounds present  EXTREMITIES:  no appreciable RUE swelling or warmth  PSYCH: calm but easily agitated when attempting to examine  SKIN: warm to touch    LABS:    03-10    145  |  105  |  4<L>  ----------------------------<  86  3.5   |  26  |  0.62    Ca    9.1      10 Mar 2019 06:25  Phos  2.2     03-10                RADIOLOGY & ADDITIONAL TESTS:

## 2019-03-10 NOTE — CHART NOTE - NSCHARTNOTEFT_GEN_A_CORE
Called by RN for witnessed seizure activity that lasted seconds. Went to evaluate patient at bedside and patient at baseline. Pt asymptomatic and resting comfortably in bed, given pt's neuro status unable to determine effects of seizure. No ativan was given as seizure self resolved.     Vital Signs Last 24 Hrs  T(C): 37.3 (10 Mar 2019 08:21), Max: 37.4 (10 Mar 2019 08:20)  T(F): 99.1 (10 Mar 2019 08:21), Max: 99.3 (10 Mar 2019 08:20)  HR: 91 (10 Mar 2019 08:20) (84 - 100)  BP: 126/73 (10 Mar 2019 08:20) (105/64 - 133/75)  BP(mean): --  RR: 18 (10 Mar 2019 08:20) (17 - 18)  SpO2: 99% (10 Mar 2019 08:20) (95% - 99%)      Discussed with medical attending. Given recent episodes of seizure like activity and low phenytoin levels neurology consult placed. EEG ordered. Team to continue to monitor patient.

## 2019-03-10 NOTE — PROGRESS NOTE ADULT - PROBLEM SELECTOR PLAN 1
- seizure like activity witnesssed by staff  EEG ordered, neuro consulted  continue w/ home Keppra, check levels  - phenytoin level low, currently on 100 mg bid IV

## 2019-03-10 NOTE — CONSULT NOTE ADULT - ASSESSMENT
INCOMPLETE NOTE    52 yo female w/ PMHx of CP(non verbal, does not ambulate), intractable seizures [facility]. Pt is non-verbal at baseline. Per discussion with group home staff pt was at neurologist office yesterday, reported that patient was different than baseline and needed to go to [facility] ED for evaluation. Per staff pt has had behavioral changes over the past week, increasingly aggressive, scratching individuals. No fever, chills, SOB, CP. Non productive cough for the past week. One episode of emesis last week. Pt does not ambulate at baseline, quadriplegic Pt previously visited [facility] ED 2/16 where she was d/c on cephalexin 500 mg bid for UTI. She is currenyl on zosyn day 5/10 for UTI.  She is on keppra 1000 BID and dilantin 100 BID. dilantin level is 3.2 on 3/10  Neuro exam significant for ***  Etiology: lowered seizure threshold due to medication intxn (zosyn and dilantin)    Plan  [] keep keppra 1000 BID  [] dilantin:  [] continue home medications and abx  [] rest of management per primary team INCOMPLETE NOTE    52 yo female w/ PMHx of CP(non verbal, does not ambulate), intractable seizures [facility]. Pt is non-verbal at baseline. Per discussion with group home staff pt was at neurologist office yesterday, reported that patient was different than baseline and needed to go to [facility] ED for evaluation. Per staff pt has had behavioral changes over the past week, increasingly aggressive, scratching individuals. No fever, chills, SOB, CP. Non productive cough for the past week. One episode of emesis last week. Pt does not ambulate at baseline, quadriplegic Pt previously visited [facility] ED 2/16 where she was d/c on cephalexin 500 mg bid for UTI. She is currenyl on zosyn day 5/10 for UTI.  She is on keppra 1000 BID and dilantin 100 BID. dilantin level is 3.2 on 3/10  Etiology: lowered seizure threshold due to medication intxn (zosyn and dilantin)    Plan  [] keep keppra 1000 BID  [] dilantin: increase to 300 daily from tomorrow. give 200 tonight instead of 100mg.   [] dilantin level in AM  [] continue home medications and abx  [] rest of management per primary team 52 yo female w/ PMHx of CP(non verbal, does not ambulate), intractable seizures [facility]. Pt is non-verbal at baseline. Per discussion with group home staff pt was at neurologist office yesterday, reported that patient was different than baseline and needed to go to [facility] ED for evaluation. Per staff pt has had behavioral changes over the past week, increasingly aggressive, scratching individuals. No fever, chills, SOB, CP. Non productive cough for the past week. One episode of emesis last week. Pt does not ambulate at baseline, quadriplegic Pt previously visited [facility] ED 2/16 where she was d/c on cephalexin 500 mg bid for UTI. She is currenyl on zosyn day 5/10 for UTI.  She is on keppra 1000 BID and dilantin 100 BID. dilantin level is 3.2 on 3/10  Etiology: lowered seizure threshold due to medication intxn (zosyn and dilantin)    Plan  [] keep keppra 1000 BID  [] dilantin: increase to 300 daily from tomorrow. give 200 tonight instead of 100mg.   [] dilantin level in AM  [] continue home medications and abx  [] rest of management per primary team

## 2019-03-10 NOTE — CONSULT NOTE ADULT - CONSULT REASON
Abnormal liver tests
seizures
Complex decision making related to goals of care
ro acute cholecystitis

## 2019-03-10 NOTE — CONSULT NOTE ADULT - SUBJECTIVE AND OBJECTIVE BOX
HPI:  History obtained from chart, patient is nonverbal  Patient is a 52 yo female w/ PMHx of CP(non verbal, does not ambulate), intractable seizures [facility]. Pt is non-verbal at baseline. Per discussion with group home staff pt was at neurologist office yesterday, reported that patient was different than baseline and needed to go to [facility] ED for evaluation. Per staff pt has had behavioral changes over the past week, increasingly aggressive, scratching individuals. No fever, chills, SOB, CP. Non productive cough for the past week. One episode of emesis last week. Pt does not ambulate at baseline, quadriplegic Pt previously visited [facility] ED 2/16 where she was d/c on cephalexin 500 mg bid for UTI. She is currenyl on zosyn day 5/10 for UTI.  She is on keppra 1000 BID and dilantin 100 BID. dilantin level is 3.2 on 3/10    In the ED:  ED labs/imaging: UA showed large LEC, neg nitrite  Imaging: CTH: No intracranial hemorrhage, mass effect or midline shift. CXR: Left pleural effusion, left retrocardiac opacity    MEDICATIONS  (STANDING):  artificial  tears Solution 1 Drop(s) Both EYES two times a day  aspirin  chewable 81 milliGRAM(s) Oral daily  buDESOnide   0.5 milliGRAM(s) Respule 0.5 milliGRAM(s) Inhalation daily  dextrose 5% + sodium chloride 0.45%. 1000 milliLiter(s) (75 mL/Hr) IV Continuous <Continuous>  docusate sodium 100 milliGRAM(s) Oral three times a day  enoxaparin Injectable 40 milliGRAM(s) SubCutaneous every 24 hours  ferrous    sulfate 325 milliGRAM(s) Oral daily  fluticasone propionate 50 MICROgram(s)/spray Nasal Spray 1 Spray(s) Both Nostrils two times a day  folic acid 1 milliGRAM(s) Oral daily  levETIRAcetam  IVPB 1000 milliGRAM(s) IV Intermittent every 12 hours  multivitamin 1 Tablet(s) Oral daily  OLANZapine 7.5 milliGRAM(s) Oral daily  pantoprazole    Tablet 40 milliGRAM(s) Oral before breakfast  phenytoin  IVPB 100 milliGRAM(s) IV Intermittent two times a day  piperacillin/tazobactam IVPB. 3.375 Gram(s) IV Intermittent every 8 hours    MEDICATIONS  (PRN):  ALBUTerol/ipratropium for Nebulization 3 milliLiter(s) Nebulizer every 6 hours PRN Shortness of Breath and/or Wheezing  LORazepam     Tablet 1 milliGRAM(s) Oral every 6 hours PRN Agitation  LORazepam   Injectable 1 milliGRAM(s) IV Push every 6 hours PRN Agitation    PAST MEDICAL & SURGICAL HISTORY:  Intellectual disability  Constipation  Seizure disorder  Cerebral palsy  No significant past surgical history    FAMILY HISTORY:  No pertinent family history in first degree relatives    Allergies  Topamax (Unknown)    Review of Systems:  CONSTITUTIONAL:  No weight loss, fever, chills, weakness or fatigue.  HEENT:  Eyes:  No visual loss, blurred vision, double vision or yellow sclerae. Ears, Nose, Throat:  No hearing loss, sneezing, congestion, runny nose or sore throat.  CARDIOVASCULAR:  No chest pain, chest pressure or chest discomfort. No palpitations or edema.  GASTROINTESTINAL:  No anorexia, nausea, vomiting or diarrhea. No abdominal pain or blood.  NEUROLOGICAL: See HPI  MUSCULOSKELETAL:  No muscle, back pain, joint pain or stiffness.  PSYCHIATRIC:  No history of depression or anxiety.    Vital Signs Last 24 Hrs  T(C): 37.3 (10 Mar 2019 08:21), Max: 37.4 (10 Mar 2019 08:20)  T(F): 99.1 (10 Mar 2019 08:21), Max: 99.3 (10 Mar 2019 08:20)  HR: 91 (10 Mar 2019 08:20) (84 - 100)  BP: 126/73 (10 Mar 2019 08:20) (105/64 - 133/75)  BP(mean): --  RR: 18 (10 Mar 2019 08:20) (17 - 18)  SpO2: 99% (10 Mar 2019 08:20) (95% - 99%)    General Exam:   General appearance: No acute distress                 Neurological Exam:  Mental Status: Orientated to self, date and place.    No dysarthria, aphasia or neglect.      Cranial Nerves: CN I - not tested.  PERRL, EOMI, VFF, no nystagmus or diplopia.    No facial asymmetry.  Hearing intact to finger rub bilaterally.     Motor:   Tone: normal.                  Strength:     [] Upper extremity                      Delt       Bicep    Tricep                                                  R         5/5        5/5        5/5       5/5                                               L          5/5        5/5        5/5       5/5  [] Lower extremity                       HF          KE          KF        DF         PF                                               R        5/5 5/5 5/5 5/5 5/5                                               L         5/5 5/5 5/5 5/5 5/5  Pronator drift: none                 Dysmetria: BL NL FTN  No truncal ataxia.    Tremor: No resting, postural or action tremor.  No myoclonus.    Sensation: intact to light touch  Toes flexor bilaterally  Gait: deferred    Other:  Radiology  CTh: negative HPI:  History obtained from chart and aide at bedside patient is nonverbal  Patient is a 54 yo female w/ PMHx of CP(non verbal, does not ambulate), intractable seizures [facility]. Pt is non-verbal at baseline. Per discussion with group home staff pt was at neurologist office on 2/26, reported that patient was different than baseline and needed to go to [facility] ED for evaluation. Per staff pt has had behavioral changes over the week prior to admission, increasingly aggressive, scratching individuals. No fever, chills, SOB, CP. Non productive cough for one week prior to admission. One episode of emesis week prior to admission. Pt does not ambulate at baseline, quadriplegic Pt previously visited [facility] ED 2/16 where she was d/c on cephalexin 500 mg bid for UTI. She is currently on zosyn day 5/10 for UTI.  She is on keppra 1000 BID and dilantin 100 BID. dilantin level is 3.2 on 3/10    In the ED:  ED labs/imaging: UA showed large LEC, neg nitrite  Imaging: CTH: No intracranial hemorrhage, mass effect or midline shift. CXR: Left pleural effusion, left retrocardiac opacity    MEDICATIONS  (STANDING):  artificial  tears Solution 1 Drop(s) Both EYES two times a day  aspirin  chewable 81 milliGRAM(s) Oral daily  buDESOnide   0.5 milliGRAM(s) Respule 0.5 milliGRAM(s) Inhalation daily  dextrose 5% + sodium chloride 0.45%. 1000 milliLiter(s) (75 mL/Hr) IV Continuous <Continuous>  docusate sodium 100 milliGRAM(s) Oral three times a day  enoxaparin Injectable 40 milliGRAM(s) SubCutaneous every 24 hours  ferrous    sulfate 325 milliGRAM(s) Oral daily  fluticasone propionate 50 MICROgram(s)/spray Nasal Spray 1 Spray(s) Both Nostrils two times a day  folic acid 1 milliGRAM(s) Oral daily  levETIRAcetam  IVPB 1000 milliGRAM(s) IV Intermittent every 12 hours  multivitamin 1 Tablet(s) Oral daily  OLANZapine 7.5 milliGRAM(s) Oral daily  pantoprazole    Tablet 40 milliGRAM(s) Oral before breakfast  phenytoin  IVPB 100 milliGRAM(s) IV Intermittent two times a day  piperacillin/tazobactam IVPB. 3.375 Gram(s) IV Intermittent every 8 hours    MEDICATIONS  (PRN):  ALBUTerol/ipratropium for Nebulization 3 milliLiter(s) Nebulizer every 6 hours PRN Shortness of Breath and/or Wheezing  LORazepam     Tablet 1 milliGRAM(s) Oral every 6 hours PRN Agitation  LORazepam   Injectable 1 milliGRAM(s) IV Push every 6 hours PRN Agitation    PAST MEDICAL & SURGICAL HISTORY:  Intellectual disability  Constipation  Seizure disorder  Cerebral palsy  No significant past surgical history    FAMILY HISTORY:  No pertinent family history in first degree relatives    Allergies  Topamax (Unknown)    Review of Systems:  CONSTITUTIONAL:  No weight loss, fever, chills, weakness or fatigue.  HEENT:  Eyes:  No visual loss, blurred vision, double vision or yellow sclerae. Ears, Nose, Throat:  No hearing loss, sneezing, congestion, runny nose or sore throat.  CARDIOVASCULAR:  No chest pain, chest pressure or chest discomfort. No palpitations or edema.  GASTROINTESTINAL:  No anorexia, nausea, vomiting or diarrhea. No abdominal pain or blood.  NEUROLOGICAL: See HPI  MUSCULOSKELETAL:  No muscle, back pain, joint pain or stiffness.  PSYCHIATRIC:  No history of depression or anxiety.    Vital Signs Last 24 Hrs  T(C): 37.3 (10 Mar 2019 08:21), Max: 37.4 (10 Mar 2019 08:20)  T(F): 99.1 (10 Mar 2019 08:21), Max: 99.3 (10 Mar 2019 08:20)  HR: 91 (10 Mar 2019 08:20) (84 - 100)  BP: 126/73 (10 Mar 2019 08:20) (105/64 - 133/75)  BP(mean): --  RR: 18 (10 Mar 2019 08:20) (17 - 18)  SpO2: 99% (10 Mar 2019 08:20) (95% - 99%)    General Exam:   General appearance: No acute distress                 Neurological Exam:  Mental Status: opens eyes to name, nonverbal, not following commands    Cranial Nerves: no facial asymmetry, gets aggressive/agitated when attempted to examine pupils. able to track from one side of bed to the other    Motor:   Tone: normal.                  Strength: will not follow commands/participate in exam but is moving extremities spontaneously  Pronator drift: cannot assess               Dysmetria: cannot assess  No truncal ataxia.    Tremor: No resting, postural or action tremor.  No myoclonus.  Toes mute  Gait: deferred    Other:  Radiology  CTh: negative

## 2019-03-10 NOTE — PROGRESS NOTE ADULT - PROBLEM SELECTOR PLAN 2
-HIDA scan negative for acute cholecystitis  -c/w zosyn IV day #6/10 for total of 10 days  -can d/c on augmentin 875mg PO BID to complete 10 days when ready for discharge, surgery in agreement

## 2019-03-11 DIAGNOSIS — E43 UNSPECIFIED SEVERE PROTEIN-CALORIE MALNUTRITION: ICD-10-CM

## 2019-03-11 LAB
ANION GAP SERPL CALC-SCNC: 13 MMO/L — SIGNIFICANT CHANGE UP (ref 7–14)
BASOPHILS # BLD AUTO: 0.04 K/UL — SIGNIFICANT CHANGE UP (ref 0–0.2)
BASOPHILS NFR BLD AUTO: 0.6 % — SIGNIFICANT CHANGE UP (ref 0–2)
BUN SERPL-MCNC: 4 MG/DL — LOW (ref 7–23)
CALCIUM SERPL-MCNC: 9 MG/DL — SIGNIFICANT CHANGE UP (ref 8.4–10.5)
CHLORIDE SERPL-SCNC: 104 MMOL/L — SIGNIFICANT CHANGE UP (ref 98–107)
CO2 SERPL-SCNC: 27 MMOL/L — SIGNIFICANT CHANGE UP (ref 22–31)
CREAT SERPL-MCNC: 0.59 MG/DL — SIGNIFICANT CHANGE UP (ref 0.5–1.3)
EOSINOPHIL # BLD AUTO: 0.21 K/UL — SIGNIFICANT CHANGE UP (ref 0–0.5)
EOSINOPHIL NFR BLD AUTO: 3.4 % — SIGNIFICANT CHANGE UP (ref 0–6)
GLUCOSE SERPL-MCNC: 117 MG/DL — HIGH (ref 70–99)
HCT VFR BLD CALC: 40.8 % — SIGNIFICANT CHANGE UP (ref 34.5–45)
HGB BLD-MCNC: 12.6 G/DL — SIGNIFICANT CHANGE UP (ref 11.5–15.5)
IMM GRANULOCYTES NFR BLD AUTO: 0.5 % — SIGNIFICANT CHANGE UP (ref 0–1.5)
LYMPHOCYTES # BLD AUTO: 2.01 K/UL — SIGNIFICANT CHANGE UP (ref 1–3.3)
LYMPHOCYTES # BLD AUTO: 32.3 % — SIGNIFICANT CHANGE UP (ref 13–44)
MAGNESIUM SERPL-MCNC: 1.6 MG/DL — SIGNIFICANT CHANGE UP (ref 1.6–2.6)
MCHC RBC-ENTMCNC: 30.9 % — LOW (ref 32–36)
MCHC RBC-ENTMCNC: 31.1 PG — SIGNIFICANT CHANGE UP (ref 27–34)
MCV RBC AUTO: 100.7 FL — HIGH (ref 80–100)
MONOCYTES # BLD AUTO: 0.69 K/UL — SIGNIFICANT CHANGE UP (ref 0–0.9)
MONOCYTES NFR BLD AUTO: 11.1 % — SIGNIFICANT CHANGE UP (ref 2–14)
NEUTROPHILS # BLD AUTO: 3.24 K/UL — SIGNIFICANT CHANGE UP (ref 1.8–7.4)
NEUTROPHILS NFR BLD AUTO: 52.1 % — SIGNIFICANT CHANGE UP (ref 43–77)
NRBC # FLD: 0 K/UL — LOW (ref 25–125)
PHENYTOIN FREE SERPL-MCNC: 2.8 UG/ML — LOW (ref 10–20)
PHOSPHATE SERPL-MCNC: 2.1 MG/DL — LOW (ref 2.5–4.5)
PLATELET # BLD AUTO: 313 K/UL — SIGNIFICANT CHANGE UP (ref 150–400)
PMV BLD: 11 FL — SIGNIFICANT CHANGE UP (ref 7–13)
POTASSIUM SERPL-MCNC: 3.6 MMOL/L — SIGNIFICANT CHANGE UP (ref 3.5–5.3)
POTASSIUM SERPL-SCNC: 3.6 MMOL/L — SIGNIFICANT CHANGE UP (ref 3.5–5.3)
RBC # BLD: 4.05 M/UL — SIGNIFICANT CHANGE UP (ref 3.8–5.2)
RBC # FLD: 13.4 % — SIGNIFICANT CHANGE UP (ref 10.3–14.5)
SODIUM SERPL-SCNC: 144 MMOL/L — SIGNIFICANT CHANGE UP (ref 135–145)
WBC # BLD: 6.22 K/UL — SIGNIFICANT CHANGE UP (ref 3.8–10.5)
WBC # FLD AUTO: 6.22 K/UL — SIGNIFICANT CHANGE UP (ref 3.8–10.5)

## 2019-03-11 PROCEDURE — 99233 SBSQ HOSP IP/OBS HIGH 50: CPT

## 2019-03-11 PROCEDURE — 99221 1ST HOSP IP/OBS SF/LOW 40: CPT | Mod: GC

## 2019-03-11 RX ORDER — LEVETIRACETAM 250 MG/1
1000 TABLET, FILM COATED ORAL
Refills: 0 | Status: DISCONTINUED | OUTPATIENT
Start: 2019-03-11 | End: 2019-03-12

## 2019-03-11 RX ADMIN — ENOXAPARIN SODIUM 40 MILLIGRAM(S): 100 INJECTION SUBCUTANEOUS at 15:51

## 2019-03-11 RX ADMIN — Medication 0.5 MILLIGRAM(S): at 09:55

## 2019-03-11 RX ADMIN — Medication 325 MILLIGRAM(S): at 15:51

## 2019-03-11 RX ADMIN — PIPERACILLIN AND TAZOBACTAM 25 GRAM(S): 4; .5 INJECTION, POWDER, LYOPHILIZED, FOR SOLUTION INTRAVENOUS at 03:50

## 2019-03-11 RX ADMIN — LEVETIRACETAM 400 MILLIGRAM(S): 250 TABLET, FILM COATED ORAL at 05:50

## 2019-03-11 RX ADMIN — Medication 112 MILLIGRAM(S): at 10:45

## 2019-03-11 RX ADMIN — Medication 1 SPRAY(S): at 05:50

## 2019-03-11 RX ADMIN — Medication 1 TABLET(S): at 15:51

## 2019-03-11 RX ADMIN — Medication 1 MILLIGRAM(S): at 15:51

## 2019-03-11 RX ADMIN — Medication 1 DROP(S): at 18:24

## 2019-03-11 RX ADMIN — Medication 1 TABLET(S): at 18:24

## 2019-03-11 RX ADMIN — Medication 1 DROP(S): at 05:50

## 2019-03-11 RX ADMIN — Medication 1 SPRAY(S): at 18:24

## 2019-03-11 RX ADMIN — OLANZAPINE 7.5 MILLIGRAM(S): 15 TABLET, FILM COATED ORAL at 15:51

## 2019-03-11 RX ADMIN — LEVETIRACETAM 1000 MILLIGRAM(S): 250 TABLET, FILM COATED ORAL at 18:24

## 2019-03-11 RX ADMIN — Medication 81 MILLIGRAM(S): at 15:51

## 2019-03-11 NOTE — PROGRESS NOTE ADULT - PROBLEM SELECTOR PROBLEM 8
Constipation
Prophylactic measure
Constipation
Dysphagia, unspecified type
Constipation
Prophylactic measure
Dysphagia, unspecified type
Constipation
Lung nodule
Lung nodule
Constipation

## 2019-03-11 NOTE — PROGRESS NOTE ADULT - PROBLEM SELECTOR PLAN 7
- dysphagia 1 diet   - aspiration precautions
- RLL 7mm nodule on CT chest, ?mucoid impaction   - repeat CT chest in 3 months
- continue w/ home Keppra   - phenytoin level low, increased to 100 mg bid (clarify home dose)
Continue with Zyprexa and Cogentin
Continue with Zyprexa and Cogentin
- continue w/ home Keppra   - phenytoin level low, increased to 100 mg bid (clarify home dose)
- c/w senna, colace, miralax  - monitor for BM's
- RLL 7mm nodule on CT chest, ?mucoid impaction   - repeat CT chest in 3 months
- dysphagia 1 diet   - aspiration precautions
Lovenox for VTE prophylaxis
- c/w senna, colace, miralax  - monitor for BM's
- continue w/ home Keppra   - phenytoin level low, increased to 100 mg bid (clarify home dose)

## 2019-03-11 NOTE — PROGRESS NOTE ADULT - PROBLEM SELECTOR PLAN 2
HIDA scan negative for acute cholecystitis  -s/p zosyn IV day #7/10 for total of 10 days, will switch to Augmentin to assure can take PO tabs for dc

## 2019-03-11 NOTE — PROGRESS NOTE ADULT - PROBLEM SELECTOR PROBLEM 9
Lung nodule
Prophylactic measure
Lung nodule
Prophylactic measure
Lung nodule

## 2019-03-11 NOTE — PROGRESS NOTE ADULT - PROBLEM SELECTOR PLAN 9
- RLL 7mm nodule on CT chest, ?mucoid impaction   - repeat CT chest in 3 months
Lovenox for VTE prophylaxis
- RLL 7mm nodule on CT chest, ?mucoid impaction   - repeat CT chest in 3 months
Lovenox for VTE prophylaxis
- RLL 7mm nodule on CT chest, ?mucoid impaction   - repeat CT chest in 3 months
- RLL 7mm nodule on CT chest, ?mucoid impaction   - repeat CT chest in 3 months

## 2019-03-11 NOTE — PROGRESS NOTE ADULT - PROBLEM SELECTOR PLAN 4
agitation may be contributing to her decreased PO intake.   - Zyprexa 2.5 mg PRN ~1-1.5 hours before food to see if that would help her intake. ***DO NOT GIVE IM Zyprexa within 4hrs of IM/IV Ativan due to risk of respiratory depression and sedation.   - patient was on dysphagia 1 diet  - aspiration precautions

## 2019-03-11 NOTE — PROGRESS NOTE ADULT - SUBJECTIVE AND OBJECTIVE BOX
Patient is a 53y old  Female who presents with a chief complaint of CP encephalopathy, transaminitis    SUBJECTIVE / OVERNIGHT EVENTS:    Patient nonverbal on my encounter  Awake tracking, does not want to be touched     MEDICATIONS  (STANDING):  amoxicillin  875 milliGRAM(s)/clavulanate 1 Tablet(s) Oral two times a day  artificial  tears Solution 1 Drop(s) Both EYES two times a day  aspirin  chewable 81 milliGRAM(s) Oral daily  buDESOnide   0.5 milliGRAM(s) Respule 0.5 milliGRAM(s) Inhalation daily  docusate sodium 100 milliGRAM(s) Oral three times a day  enoxaparin Injectable 40 milliGRAM(s) SubCutaneous every 24 hours  ferrous    sulfate 325 milliGRAM(s) Oral daily  fluticasone propionate 50 MICROgram(s)/spray Nasal Spray 1 Spray(s) Both Nostrils two times a day  folic acid 1 milliGRAM(s) Oral daily  levETIRAcetam  IVPB 1000 milliGRAM(s) IV Intermittent every 12 hours  multivitamin 1 Tablet(s) Oral daily  OLANZapine 7.5 milliGRAM(s) Oral daily  pantoprazole    Tablet 40 milliGRAM(s) Oral before breakfast  phenytoin  IVPB 300 milliGRAM(s) IV Intermittent daily    MEDICATIONS  (PRN):  ALBUTerol/ipratropium for Nebulization 3 milliLiter(s) Nebulizer every 6 hours PRN Shortness of Breath and/or Wheezing  LORazepam     Tablet 1 milliGRAM(s) Oral every 6 hours PRN Agitation  LORazepam   Injectable 1 milliGRAM(s) IV Push every 6 hours PRN Agitation    T(C): 37.1 (03-11-19 @ 05:49), Max: 37.5 (03-10-19 @ 21:09)  HR: 78 (03-11-19 @ 05:49) (78 - 94)  BP: 107/73 (03-11-19 @ 05:49) (104/62 - 142/73)  RR: 16 (03-11-19 @ 05:49) (16 - 18)  SpO2: 97% (03-11-19 @ 05:49) (95% - 99%)    I&O's Summary    10 Mar 2019 07:01  -  11 Mar 2019 07:00  --------------------------------------------------------  IN: 425 mL / OUT: 0 mL / NET: 425 mL    PHYSICAL EXAM:  GENERAL: NAD, well-developed  CHEST/LUNG: Clear to auscultation bilaterally; No wheeze  HEART: Regular rate and rhythm; No murmurs, rubs, or gallops  ABDOMEN: Soft, Nontender, Nondistended; Bowel sounds present  EXTREMITIES:   LE appear contracted, Feet appear to have edema on dorsal aspect   PSYCH: calm but irritable when touched for exam  NEUROLOGY: moving upper ext   SKIN: No rashes or lesions    LABS:                        12.6   6.22  )-----------( 313      ( 11 Mar 2019 04:30 )             40.8     03-11    144  |  104  |  4<L>  ----------------------------<  117<H>  3.6   |  27  |  0.59    Ca    9.0      11 Mar 2019 04:30  Phos  2.1     03-11  Mg     1.6     03-11    Consultant(s) Notes Reviewed:    Care Discussed with Consultants/Other Providers:

## 2019-03-11 NOTE — PROGRESS NOTE ADULT - PROBLEM SELECTOR PROBLEM 7
Dysphagia, unspecified type
Dysphagia, unspecified type
Seizure disorder
Lung nodule
Seizure disorder
Constipation
Lung nodule
Constipation
Cerebral palsy, unspecified type
Cerebral palsy, unspecified type
Seizure disorder
Prophylactic measure

## 2019-03-11 NOTE — CHART NOTE - NSCHARTNOTEFT_GEN_A_CORE
Notified by nurse pt with seizure-like activity lasting 10secs. Pt seen and evaluated. Resting comfortably in bed.   Vitals stable. Will continue to monitor

## 2019-03-11 NOTE — PROGRESS NOTE ADULT - PROBLEM SELECTOR PLAN 8
- c/w senna, colace, miralax  - monitor for BM's
- c/w senna, colace, miralax  - monitor for BM's
Lovenox for VTE prophylaxis
- RLL 7mm nodule on CT chest, ?mucoid impaction   - repeat CT chest in 3 months
- c/w senna, colace, miralax  - monitor for BM's
Lovenox for VTE prophylaxis
- c/w senna, colace, miralax  - monitor for BM's
- dysphagia 1 diet   - aspiration precautions
- RLL 7mm nodule on CT chest, ?mucoid impaction   - repeat CT chest in 3 months
- c/w senna, colace, miralax  - monitor for BM's
- dysphagia 1 diet   - aspiration precautions

## 2019-03-11 NOTE — CHART NOTE - NSCHARTNOTEFT_GEN_A_CORE
Discussed with neurology regarding seizure episode overnight and low dilantin level- No changes made to medications- Switched Zosyn to Augmentin - D/w Pts mother and  regarding the seizures and plan of care- Will Continue to monitor- Repeat Dilantin level in AM .

## 2019-03-12 LAB
ANION GAP SERPL CALC-SCNC: 15 MMO/L — HIGH (ref 7–14)
BUN SERPL-MCNC: 4 MG/DL — LOW (ref 7–23)
CALCIUM SERPL-MCNC: 9.3 MG/DL — SIGNIFICANT CHANGE UP (ref 8.4–10.5)
CHLORIDE SERPL-SCNC: 102 MMOL/L — SIGNIFICANT CHANGE UP (ref 98–107)
CO2 SERPL-SCNC: 28 MMOL/L — SIGNIFICANT CHANGE UP (ref 22–31)
CREAT SERPL-MCNC: 0.53 MG/DL — SIGNIFICANT CHANGE UP (ref 0.5–1.3)
GLUCOSE SERPL-MCNC: 92 MG/DL — SIGNIFICANT CHANGE UP (ref 70–99)
HCT VFR BLD CALC: 45.4 % — HIGH (ref 34.5–45)
HGB BLD-MCNC: 14.7 G/DL — SIGNIFICANT CHANGE UP (ref 11.5–15.5)
MCHC RBC-ENTMCNC: 31.8 PG — SIGNIFICANT CHANGE UP (ref 27–34)
MCHC RBC-ENTMCNC: 32.4 % — SIGNIFICANT CHANGE UP (ref 32–36)
MCV RBC AUTO: 98.3 FL — SIGNIFICANT CHANGE UP (ref 80–100)
NRBC # FLD: 0 K/UL — LOW (ref 25–125)
PHENYTOIN FREE SERPL-MCNC: 2.8 UG/ML — LOW (ref 10–20)
PLATELET # BLD AUTO: 355 K/UL — SIGNIFICANT CHANGE UP (ref 150–400)
PMV BLD: 11.2 FL — SIGNIFICANT CHANGE UP (ref 7–13)
POTASSIUM SERPL-MCNC: 3.7 MMOL/L — SIGNIFICANT CHANGE UP (ref 3.5–5.3)
POTASSIUM SERPL-SCNC: 3.7 MMOL/L — SIGNIFICANT CHANGE UP (ref 3.5–5.3)
RBC # BLD: 4.62 M/UL — SIGNIFICANT CHANGE UP (ref 3.8–5.2)
RBC # FLD: 13.4 % — SIGNIFICANT CHANGE UP (ref 10.3–14.5)
SODIUM SERPL-SCNC: 145 MMOL/L — SIGNIFICANT CHANGE UP (ref 135–145)
WBC # BLD: 9.14 K/UL — SIGNIFICANT CHANGE UP (ref 3.8–10.5)
WBC # FLD AUTO: 9.14 K/UL — SIGNIFICANT CHANGE UP (ref 3.8–10.5)

## 2019-03-12 PROCEDURE — 99233 SBSQ HOSP IP/OBS HIGH 50: CPT

## 2019-03-12 RX ORDER — LACOSAMIDE 50 MG/1
100 TABLET ORAL
Refills: 0 | Status: DISCONTINUED | OUTPATIENT
Start: 2019-03-12 | End: 2019-03-12

## 2019-03-12 RX ORDER — LEVETIRACETAM 250 MG/1
1000 TABLET, FILM COATED ORAL EVERY 12 HOURS
Refills: 0 | Status: DISCONTINUED | OUTPATIENT
Start: 2019-03-12 | End: 2019-03-13

## 2019-03-12 RX ORDER — LACOSAMIDE 50 MG/1
100 TABLET ORAL EVERY 12 HOURS
Refills: 0 | Status: DISCONTINUED | OUTPATIENT
Start: 2019-03-12 | End: 2019-03-13

## 2019-03-12 RX ADMIN — Medication 1 SPRAY(S): at 17:52

## 2019-03-12 RX ADMIN — ENOXAPARIN SODIUM 40 MILLIGRAM(S): 100 INJECTION SUBCUTANEOUS at 11:10

## 2019-03-12 RX ADMIN — Medication 0.5 MILLIGRAM(S): at 10:26

## 2019-03-12 RX ADMIN — Medication 150 MILLIGRAM(S): at 06:32

## 2019-03-12 RX ADMIN — Medication 1 DROP(S): at 17:51

## 2019-03-12 RX ADMIN — Medication 1 TABLET(S): at 05:26

## 2019-03-12 RX ADMIN — Medication 1 SPRAY(S): at 05:26

## 2019-03-12 RX ADMIN — LEVETIRACETAM 400 MILLIGRAM(S): 250 TABLET, FILM COATED ORAL at 21:12

## 2019-03-12 RX ADMIN — PANTOPRAZOLE SODIUM 40 MILLIGRAM(S): 20 TABLET, DELAYED RELEASE ORAL at 05:26

## 2019-03-12 RX ADMIN — Medication 100 MILLIGRAM(S): at 05:27

## 2019-03-12 RX ADMIN — LEVETIRACETAM 1000 MILLIGRAM(S): 250 TABLET, FILM COATED ORAL at 05:26

## 2019-03-12 RX ADMIN — LACOSAMIDE 120 MILLIGRAM(S): 50 TABLET ORAL at 21:13

## 2019-03-12 RX ADMIN — Medication 1 DROP(S): at 05:26

## 2019-03-12 NOTE — PROGRESS NOTE ADULT - SUBJECTIVE AND OBJECTIVE BOX
Patient is a 53y old  Female who presents with a chief complaint of encephalopathy    SUBJECTIVE / OVERNIGHT EVENTS:    Unable to obtain ROS 2/2 nonverbal status     MEDICATIONS  (STANDING):  amoxicillin  875 milliGRAM(s)/clavulanate 1 Tablet(s) Oral two times a day  artificial  tears Solution 1 Drop(s) Both EYES two times a day  aspirin  chewable 81 milliGRAM(s) Oral daily  buDESOnide   0.5 milliGRAM(s) Respule 0.5 milliGRAM(s) Inhalation daily  docusate sodium 100 milliGRAM(s) Oral three times a day  enoxaparin Injectable 40 milliGRAM(s) SubCutaneous every 24 hours  ferrous    sulfate 325 milliGRAM(s) Oral daily  fluticasone propionate 50 MICROgram(s)/spray Nasal Spray 1 Spray(s) Both Nostrils two times a day  folic acid 1 milliGRAM(s) Oral daily  levETIRAcetam 1000 milliGRAM(s) Oral two times a day  multivitamin 1 Tablet(s) Oral daily  OLANZapine 7.5 milliGRAM(s) Oral daily  pantoprazole    Tablet 40 milliGRAM(s) Oral before breakfast  phenytoin   Chewable 150 milliGRAM(s) Chew two times a day    MEDICATIONS  (PRN):  ALBUTerol/ipratropium for Nebulization 3 milliLiter(s) Nebulizer every 6 hours PRN Shortness of Breath and/or Wheezing  LORazepam     Tablet 1 milliGRAM(s) Oral every 6 hours PRN Agitation  LORazepam   Injectable 1 milliGRAM(s) IV Push every 6 hours PRN Agitation    T(C): 36.7 (03-12-19 @ 05:25), Max: 36.8 (03-11-19 @ 20:34)  HR: 85 (03-12-19 @ 05:25) (72 - 86)  BP: 140/80 (03-12-19 @ 05:25) (130/73 - 149/99)  RR: 18 (03-12-19 @ 05:25) (17 - 18)  SpO2: 100% (03-12-19 @ 05:25) (99% - 100%)    PHYSICAL EXAM:  GENERAL: NAD, well-developed  HEENT: poor dentition, intermittently tracks   CHEST/LUNG: Clear to auscultation bilaterally; No wheeze  HEART: Regular rate and rhythm; No murmurs, rubs, or gallops  ABDOMEN: Soft, Nontender, Nondistended; Bowel sounds present  EXTREMITIES:   feet appear to have edema on dorsal aspect   PSYCH: calm but irritable when touched for exam  NEUROLOGY: moving upper and lower ext   SKIN: No rashes or lesions      LABS:                        14.7   9.14  )-----------( 355      ( 12 Mar 2019 06:41 )             45.4     03-12    145  |  102  |  4<L>  ----------------------------<  92  3.7   |  28  |  0.53    Ca    9.3      12 Mar 2019 06:41  Phos  2.1     03-11  Mg     1.6     03-11      Consultant(s) Notes Reviewed:    Care Discussed with Consultants/Other Providers:

## 2019-03-12 NOTE — CHART NOTE - NSCHARTNOTEFT_GEN_A_CORE
Stop dilantin as she has medication interactions. Start Vimpat 100 BID and continue keppra 1000 BID.

## 2019-03-12 NOTE — CHART NOTE - NSCHARTNOTEFT_GEN_A_CORE
Called Neurology team to update on episode of seizure overnight and Phenytoin level still being low- Per Neuro resident will give recommendations after seen by Attending- Await Neuro recs

## 2019-03-12 NOTE — CHART NOTE - NSCHARTNOTEFT_GEN_A_CORE
Spoke with pts mother and updated on plan of care- D/w Neurology who would like to continue Vimpat 100 mg BID- Will follow up Neuro recs in AM

## 2019-03-12 NOTE — CHART NOTE - NSCHARTNOTESELECT_GEN_ALL_CORE
ADS/Event Note
Event Note
neurology/Event Note
ADS/Event Note
ADS/Event Note
Event Note
GI Update/Event Note
Malnutrition Alert/Nutrition Services
ads/Event Note

## 2019-03-13 PROCEDURE — 99232 SBSQ HOSP IP/OBS MODERATE 35: CPT

## 2019-03-13 RX ORDER — LACOSAMIDE 50 MG/1
100 TABLET ORAL
Refills: 0 | Status: DISCONTINUED | OUTPATIENT
Start: 2019-03-13 | End: 2019-03-14

## 2019-03-13 RX ORDER — LACOSAMIDE 50 MG/1
100 TABLET ORAL
Refills: 0 | Status: DISCONTINUED | OUTPATIENT
Start: 2019-03-13 | End: 2019-03-13

## 2019-03-13 RX ORDER — LEVETIRACETAM 250 MG/1
1000 TABLET, FILM COATED ORAL
Refills: 0 | Status: DISCONTINUED | OUTPATIENT
Start: 2019-03-13 | End: 2019-03-13

## 2019-03-13 RX ORDER — LEVETIRACETAM 250 MG/1
1000 TABLET, FILM COATED ORAL
Refills: 0 | Status: DISCONTINUED | OUTPATIENT
Start: 2019-03-13 | End: 2019-03-15

## 2019-03-13 RX ADMIN — Medication 1 TABLET(S): at 21:08

## 2019-03-13 RX ADMIN — LEVETIRACETAM 400 MILLIGRAM(S): 250 TABLET, FILM COATED ORAL at 10:02

## 2019-03-13 RX ADMIN — LEVETIRACETAM 1000 MILLIGRAM(S): 250 TABLET, FILM COATED ORAL at 21:00

## 2019-03-13 RX ADMIN — LACOSAMIDE 120 MILLIGRAM(S): 50 TABLET ORAL at 10:25

## 2019-03-13 RX ADMIN — LACOSAMIDE 100 MILLIGRAM(S): 50 TABLET ORAL at 21:07

## 2019-03-13 RX ADMIN — Medication 1 SPRAY(S): at 05:25

## 2019-03-13 RX ADMIN — Medication 100 MILLIGRAM(S): at 05:27

## 2019-03-13 RX ADMIN — ENOXAPARIN SODIUM 40 MILLIGRAM(S): 100 INJECTION SUBCUTANEOUS at 12:23

## 2019-03-13 RX ADMIN — Medication 100 MILLIGRAM(S): at 21:08

## 2019-03-13 RX ADMIN — Medication 1 SPRAY(S): at 17:41

## 2019-03-13 RX ADMIN — Medication 1 DROP(S): at 17:40

## 2019-03-13 RX ADMIN — PANTOPRAZOLE SODIUM 40 MILLIGRAM(S): 20 TABLET, DELAYED RELEASE ORAL at 05:27

## 2019-03-13 RX ADMIN — Medication 1 TABLET(S): at 05:27

## 2019-03-13 RX ADMIN — Medication 1 DROP(S): at 05:25

## 2019-03-13 NOTE — PROGRESS NOTE ADULT - SUBJECTIVE AND OBJECTIVE BOX
Patient is a 53y old  Female who presents with a chief complaint of Encephalopathy, Seizures    SUBJECTIVE / OVERNIGHT EVENTS:    Nonverbal, unable to voice ROS    MEDICATIONS  (STANDING):  amoxicillin  875 milliGRAM(s)/clavulanate 1 Tablet(s) Oral two times a day  artificial  tears Solution 1 Drop(s) Both EYES two times a day  aspirin  chewable 81 milliGRAM(s) Oral daily  buDESOnide   0.5 milliGRAM(s) Respule 0.5 milliGRAM(s) Inhalation daily  docusate sodium 100 milliGRAM(s) Oral three times a day  enoxaparin Injectable 40 milliGRAM(s) SubCutaneous every 24 hours  ferrous    sulfate 325 milliGRAM(s) Oral daily  fluticasone propionate 50 MICROgram(s)/spray Nasal Spray 1 Spray(s) Both Nostrils two times a day  folic acid 1 milliGRAM(s) Oral daily  lacosamide IVPB 100 milliGRAM(s) IV Intermittent every 12 hours  levETIRAcetam  IVPB 1000 milliGRAM(s) IV Intermittent every 12 hours  multivitamin 1 Tablet(s) Oral daily  OLANZapine 7.5 milliGRAM(s) Oral daily  pantoprazole    Tablet 40 milliGRAM(s) Oral before breakfast    MEDICATIONS  (PRN):  ALBUTerol/ipratropium for Nebulization 3 milliLiter(s) Nebulizer every 6 hours PRN Shortness of Breath and/or Wheezing  LORazepam     Tablet 1 milliGRAM(s) Oral every 6 hours PRN Agitation  LORazepam   Injectable 1 milliGRAM(s) IV Push every 6 hours PRN Agitation    T(C): 36.4 (03-13-19 @ 12:05), Max: 36.6 (03-13-19 @ 05:24)  HR: 70 (03-13-19 @ 12:05) (66 - 81)  BP: 139/75 (03-13-19 @ 12:05) (118/88 - 139/75)  RR: 18 (03-13-19 @ 12:05) (16 - 18)  SpO2: 100% (03-13-19 @ 12:05) (100% - 100%)    PHYSICAL EXAM:  GENERAL: NAD, well-developed  HEENT: poor dentition, intermittently tracks   CHEST/LUNG: Clear to auscultation bilaterally; No wheeze  HEART: Regular rate and rhythm; No murmurs, rubs, or gallops  ABDOMEN: Soft, Nontender, Nondistended; Bowel sounds present  EXTREMITIES:   feet appear to have edema on dorsal aspect, keeps LE bent/curled   PSYCH: calm but irritable when touched for exam  NEUROLOGY: moving upper and lower ext   SKIN: No rashes or lesions    LABS:  no new labs             Consultant(s) Notes Reviewed:  neurology   Care Discussed with Consultants/Other Providers:

## 2019-03-14 DIAGNOSIS — E87.0 HYPEROSMOLALITY AND HYPERNATREMIA: ICD-10-CM

## 2019-03-14 DIAGNOSIS — K81.0 ACUTE CHOLECYSTITIS: ICD-10-CM

## 2019-03-14 DIAGNOSIS — M79.89 OTHER SPECIFIED SOFT TISSUE DISORDERS: ICD-10-CM

## 2019-03-14 LAB
ALBUMIN SERPL ELPH-MCNC: 3.5 G/DL — SIGNIFICANT CHANGE UP (ref 3.3–5)
ALP SERPL-CCNC: 121 U/L — HIGH (ref 40–120)
ALT FLD-CCNC: 37 U/L — HIGH (ref 4–33)
ANION GAP SERPL CALC-SCNC: 17 MMO/L — HIGH (ref 7–14)
AST SERPL-CCNC: 30 U/L — SIGNIFICANT CHANGE UP (ref 4–32)
BILIRUB SERPL-MCNC: 0.5 MG/DL — SIGNIFICANT CHANGE UP (ref 0.2–1.2)
BUN SERPL-MCNC: 7 MG/DL — SIGNIFICANT CHANGE UP (ref 7–23)
CALCIUM SERPL-MCNC: 9.2 MG/DL — SIGNIFICANT CHANGE UP (ref 8.4–10.5)
CHLORIDE SERPL-SCNC: 100 MMOL/L — SIGNIFICANT CHANGE UP (ref 98–107)
CO2 SERPL-SCNC: 24 MMOL/L — SIGNIFICANT CHANGE UP (ref 22–31)
CREAT SERPL-MCNC: 0.44 MG/DL — LOW (ref 0.5–1.3)
GLUCOSE SERPL-MCNC: 93 MG/DL — SIGNIFICANT CHANGE UP (ref 70–99)
HCT VFR BLD CALC: 48.2 % — HIGH (ref 34.5–45)
HGB BLD-MCNC: 15 G/DL — SIGNIFICANT CHANGE UP (ref 11.5–15.5)
MAGNESIUM SERPL-MCNC: 1.8 MG/DL — SIGNIFICANT CHANGE UP (ref 1.6–2.6)
MCHC RBC-ENTMCNC: 30.9 PG — SIGNIFICANT CHANGE UP (ref 27–34)
MCHC RBC-ENTMCNC: 31.1 % — LOW (ref 32–36)
MCV RBC AUTO: 99.2 FL — SIGNIFICANT CHANGE UP (ref 80–100)
NRBC # FLD: 0 K/UL — LOW (ref 25–125)
PHOSPHATE SERPL-MCNC: 2.9 MG/DL — SIGNIFICANT CHANGE UP (ref 2.5–4.5)
PLATELET # BLD AUTO: 327 K/UL — SIGNIFICANT CHANGE UP (ref 150–400)
PMV BLD: 11.2 FL — SIGNIFICANT CHANGE UP (ref 7–13)
POTASSIUM SERPL-MCNC: 4.4 MMOL/L — SIGNIFICANT CHANGE UP (ref 3.5–5.3)
POTASSIUM SERPL-SCNC: 4.4 MMOL/L — SIGNIFICANT CHANGE UP (ref 3.5–5.3)
PROT SERPL-MCNC: 7.9 G/DL — SIGNIFICANT CHANGE UP (ref 6–8.3)
RBC # BLD: 4.86 M/UL — SIGNIFICANT CHANGE UP (ref 3.8–5.2)
RBC # FLD: 13.3 % — SIGNIFICANT CHANGE UP (ref 10.3–14.5)
SODIUM SERPL-SCNC: 141 MMOL/L — SIGNIFICANT CHANGE UP (ref 135–145)
WBC # BLD: 8.77 K/UL — SIGNIFICANT CHANGE UP (ref 3.8–10.5)
WBC # FLD AUTO: 8.77 K/UL — SIGNIFICANT CHANGE UP (ref 3.8–10.5)

## 2019-03-14 PROCEDURE — 99232 SBSQ HOSP IP/OBS MODERATE 35: CPT

## 2019-03-14 RX ORDER — LACOSAMIDE 50 MG/1
100 TABLET ORAL
Refills: 0 | Status: DISCONTINUED | OUTPATIENT
Start: 2019-03-14 | End: 2019-03-15

## 2019-03-14 RX ADMIN — Medication 1 TABLET(S): at 17:59

## 2019-03-14 RX ADMIN — Medication 1 DROP(S): at 05:42

## 2019-03-14 RX ADMIN — Medication 1 DROP(S): at 17:59

## 2019-03-14 RX ADMIN — Medication 100 MILLIGRAM(S): at 21:08

## 2019-03-14 RX ADMIN — Medication 0.5 MILLIGRAM(S): at 08:55

## 2019-03-14 RX ADMIN — Medication 1 TABLET(S): at 05:43

## 2019-03-14 RX ADMIN — Medication 1 SPRAY(S): at 05:42

## 2019-03-14 RX ADMIN — LEVETIRACETAM 1000 MILLIGRAM(S): 250 TABLET, FILM COATED ORAL at 20:08

## 2019-03-14 RX ADMIN — Medication 100 MILLIGRAM(S): at 05:43

## 2019-03-14 RX ADMIN — Medication 1 SPRAY(S): at 17:58

## 2019-03-14 RX ADMIN — PANTOPRAZOLE SODIUM 40 MILLIGRAM(S): 20 TABLET, DELAYED RELEASE ORAL at 05:43

## 2019-03-14 RX ADMIN — LACOSAMIDE 100 MILLIGRAM(S): 50 TABLET ORAL at 20:08

## 2019-03-14 RX ADMIN — ENOXAPARIN SODIUM 40 MILLIGRAM(S): 100 INJECTION SUBCUTANEOUS at 12:26

## 2019-03-14 NOTE — PROGRESS NOTE ADULT - SUBJECTIVE AND OBJECTIVE BOX
Patient is a 53y old  Female who presents with a chief complaint of encephalopathy, cholecystitis, seizures    SUBJECTIVE / OVERNIGHT EVENTS:    unable to obtain ROS due to nonverbal state     MEDICATIONS  (STANDING):  amoxicillin  875 milliGRAM(s)/clavulanate 1 Tablet(s) Oral two times a day  artificial  tears Solution 1 Drop(s) Both EYES two times a day  aspirin  chewable 81 milliGRAM(s) Oral daily  buDESOnide   0.5 milliGRAM(s) Respule 0.5 milliGRAM(s) Inhalation daily  docusate sodium 100 milliGRAM(s) Oral three times a day  enoxaparin Injectable 40 milliGRAM(s) SubCutaneous every 24 hours  fluticasone propionate 50 MICROgram(s)/spray Nasal Spray 1 Spray(s) Both Nostrils two times a day  lacosamide 100 milliGRAM(s) Oral <User Schedule>  levETIRAcetam 1000 milliGRAM(s) Oral <User Schedule>  multivitamin 1 Tablet(s) Oral daily  OLANZapine 7.5 milliGRAM(s) Oral daily  pantoprazole    Tablet 40 milliGRAM(s) Oral before breakfast    MEDICATIONS  (PRN):  ALBUTerol/ipratropium for Nebulization 3 milliLiter(s) Nebulizer every 6 hours PRN Shortness of Breath and/or Wheezing  LORazepam     Tablet 1 milliGRAM(s) Oral every 6 hours PRN Agitation  LORazepam   Injectable 1 milliGRAM(s) IV Push every 6 hours PRN Agitation    T(C): 36.6 (03-14-19 @ 05:41), Max: 36.6 (03-14-19 @ 05:41)  HR: 67 (03-14-19 @ 08:55) (67 - 80)  BP: 150/76 (03-14-19 @ 05:41) (125/71 - 150/76)  RR: 18 (03-14-19 @ 05:41) (17 - 18)  SpO2: 98% (03-14-19 @ 08:55) (96% - 100%)    PHYSICAL EXAM:  GENERAL: NAD, well-developed, nonverbal, disheveled   HEENT: poor dentition, intermittently tracks   CHEST/LUNG: Clear to auscultation bilaterally; No wheeze  HEART: Regular rate and rhythm; No murmurs, rubs, or gallops  ABDOMEN: Soft, Nontender, Nondistended; Bowel sounds present  EXTREMITIES:   feet appear to have edema on dorsal aspect   PSYCH: calm but irritable when touched for exam  NEUROLOGY: moving upper and lower ext   SKIN: No rashes or lesions    LABS:                        15.0   8.77  )-----------( 327      ( 14 Mar 2019 05:00 )             48.2     03-14    141  |  100  |  7   ----------------------------<  93  4.4   |  24  |  0.44<L>    Ca    9.2      14 Mar 2019 05:00  Phos  2.9     03-14  Mg     1.8     03-14    TPro  7.9  /  Alb  3.5  /  TBili  0.5  /  DBili  x   /  AST  30  /  ALT  37<H>  /  AlkPhos  121<H>  03-14      Consultant(s) Notes Reviewed:    Care Discussed with Consultants/Other Providers:

## 2019-03-15 ENCOUNTER — TRANSCRIPTION ENCOUNTER (OUTPATIENT)
Age: 54
End: 2019-03-15

## 2019-03-15 VITALS
HEART RATE: 86 BPM | TEMPERATURE: 98 F | OXYGEN SATURATION: 99 % | RESPIRATION RATE: 18 BRPM | DIASTOLIC BLOOD PRESSURE: 83 MMHG | SYSTOLIC BLOOD PRESSURE: 145 MMHG

## 2019-03-15 PROCEDURE — 99239 HOSP IP/OBS DSCHRG MGMT >30: CPT

## 2019-03-15 RX ORDER — FOLIC ACID 0.8 MG
1 TABLET ORAL
Qty: 0 | Refills: 0 | DISCHARGE
Start: 2019-03-15 | End: 2019-04-13

## 2019-03-15 RX ORDER — BUDESONIDE, MICRONIZED 100 %
1 POWDER (GRAM) MISCELLANEOUS
Qty: 1 | Refills: 0
Start: 2019-03-15 | End: 2019-04-13

## 2019-03-15 RX ORDER — LACOSAMIDE 50 MG/1
1 TABLET ORAL
Qty: 60 | Refills: 0
Start: 2019-03-15 | End: 2019-04-13

## 2019-03-15 RX ORDER — OLANZAPINE 15 MG/1
1 TABLET, FILM COATED ORAL
Qty: 30 | Refills: 0
Start: 2019-03-15 | End: 2019-04-13

## 2019-03-15 RX ORDER — ASPIRIN/CALCIUM CARB/MAGNESIUM 324 MG
1 TABLET ORAL
Qty: 30 | Refills: 0
Start: 2019-03-15 | End: 2019-04-13

## 2019-03-15 RX ORDER — FLUTICASONE PROPIONATE 50 MCG
1 SPRAY, SUSPENSION NASAL
Qty: 1 | Refills: 0
Start: 2019-03-15 | End: 2019-04-13

## 2019-03-15 RX ORDER — PANTOPRAZOLE SODIUM 20 MG/1
1 TABLET, DELAYED RELEASE ORAL
Qty: 30 | Refills: 0
Start: 2019-03-15 | End: 2019-04-13

## 2019-03-15 RX ORDER — LEVETIRACETAM 250 MG/1
1 TABLET, FILM COATED ORAL
Qty: 60 | Refills: 0
Start: 2019-03-15 | End: 2019-04-13

## 2019-03-15 RX ORDER — DOCUSATE SODIUM 100 MG
1 CAPSULE ORAL
Qty: 90 | Refills: 0
Start: 2019-03-15 | End: 2019-04-13

## 2019-03-15 RX ORDER — LACOSAMIDE 50 MG/1
1 TABLET ORAL
Qty: 0 | Refills: 0 | DISCHARGE
Start: 2019-03-15

## 2019-03-15 RX ORDER — FERROUS SULFATE 325(65) MG
1 TABLET ORAL
Qty: 30 | Refills: 0
Start: 2019-03-15 | End: 2019-04-13

## 2019-03-15 RX ORDER — FOLIC ACID 0.8 MG
1 TABLET ORAL
Qty: 30 | Refills: 0
Start: 2019-03-15 | End: 2019-04-13

## 2019-03-15 RX ADMIN — Medication 0.5 MILLIGRAM(S): at 10:49

## 2019-03-15 RX ADMIN — LACOSAMIDE 100 MILLIGRAM(S): 50 TABLET ORAL at 09:55

## 2019-03-15 RX ADMIN — Medication 1 SPRAY(S): at 05:27

## 2019-03-15 RX ADMIN — Medication 1 TABLET(S): at 05:27

## 2019-03-15 RX ADMIN — Medication 100 MILLIGRAM(S): at 05:27

## 2019-03-15 RX ADMIN — Medication 1 TABLET(S): at 13:33

## 2019-03-15 RX ADMIN — LEVETIRACETAM 1000 MILLIGRAM(S): 250 TABLET, FILM COATED ORAL at 09:55

## 2019-03-15 RX ADMIN — ENOXAPARIN SODIUM 40 MILLIGRAM(S): 100 INJECTION SUBCUTANEOUS at 13:33

## 2019-03-15 RX ADMIN — OLANZAPINE 7.5 MILLIGRAM(S): 15 TABLET, FILM COATED ORAL at 13:33

## 2019-03-15 RX ADMIN — PANTOPRAZOLE SODIUM 40 MILLIGRAM(S): 20 TABLET, DELAYED RELEASE ORAL at 05:27

## 2019-03-15 RX ADMIN — Medication 1 DROP(S): at 05:27

## 2019-03-15 RX ADMIN — Medication 81 MILLIGRAM(S): at 13:33

## 2019-03-15 NOTE — PROGRESS NOTE ADULT - SUBJECTIVE AND OBJECTIVE BOX
Patient is a 53y old  Female who presents with a chief complaint of seizures    SUBJECTIVE / OVERNIGHT EVENTS:    No reported overnight events, no seizures  Intermittently refusing meds, approx takes 3/4 doses over 48 hours  D/w this mother and group home and state this is not new  Sometimes also refuses PO, today drank a Glucerna when I offered it    MEDICATIONS  (STANDING):  artificial  tears Solution 1 Drop(s) Both EYES two times a day  aspirin  chewable 81 milliGRAM(s) Oral daily  buDESOnide   0.5 milliGRAM(s) Respule 0.5 milliGRAM(s) Inhalation daily  docusate sodium 100 milliGRAM(s) Oral three times a day  enoxaparin Injectable 40 milliGRAM(s) SubCutaneous every 24 hours  fluticasone propionate 50 MICROgram(s)/spray Nasal Spray 1 Spray(s) Both Nostrils two times a day  lacosamide 100 milliGRAM(s) Oral <User Schedule>  levETIRAcetam 1000 milliGRAM(s) Oral <User Schedule>  multivitamin 1 Tablet(s) Oral daily  OLANZapine 7.5 milliGRAM(s) Oral daily  pantoprazole    Tablet 40 milliGRAM(s) Oral before breakfast    MEDICATIONS  (PRN):  ALBUTerol/ipratropium for Nebulization 3 milliLiter(s) Nebulizer every 6 hours PRN Shortness of Breath and/or Wheezing  LORazepam     Tablet 1 milliGRAM(s) Oral every 6 hours PRN Agitation  LORazepam   Injectable 1 milliGRAM(s) IV Push every 6 hours PRN Agitation    T(C): 36.5 (03-15-19 @ 05:26), Max: 36.8 (03-14-19 @ 14:43)  HR: 66 (03-15-19 @ 10:49) (66 - 90)  BP: 145/83 (03-15-19 @ 05:26) (113/65 - 145/83)  RR: 18 (03-15-19 @ 05:26) (18 - 18)  SpO2: 99% (03-15-19 @ 05:26) (99% - 100%)    PHYSICAL EXAM:  GENERAL: NAD, well-developed, nonverbal, disheveled   HEENT: poor dentition, intermittently tracks, discordant gaze  CHEST/LUNG: Clear to auscultation bilaterally; No wheeze  HEART: Regular rate and rhythm; No murmurs, rubs, or gallops  ABDOMEN: Soft, Nontender, Nondistended; Bowel sounds present  EXTREMITIES:   feet appear to have edema on dorsal aspect, does not like LE touched    PSYCH: calm but irritable when touched for exam  NEUROLOGY: moving upper and lower ext   SKIN: No rashes or lesions on visible skin     LABS: no new labs               Consultant(s) Notes Reviewed:    Care Discussed with Consultants/Other Providers: neurology resident

## 2019-03-15 NOTE — PROGRESS NOTE ADULT - ASSESSMENT
54 yo F with CP (non verbal, does not ambulate), intractable seizures, [facility] c/b transaminitis and RUQ pain s/p EUS on 3/4/19 with concern for mild acute cholecystitis s/p HIDA scan with no evidence of acute cholecystitis seen by surgery no plan for surgical intervention--recently increased seizures.
54 yo F with CP (non verbal, does not ambulate), intractable seizures, [facility] c/b transaminitis and RUQ pain s/p EUS on 3/4/19 with concern for mild acute cholecystitis s/p HIDA scan with no evidence of acute cholecystitis seen by surgery no plan for surgical intervention completed course of abx with resolution of WBC elevation, transaminitis also resolving and remains afebrile--recently increased seizures s/p AED change.
Impression:    1. Abnormal LFT's mixed cholestatic and hepatocellular pattern: now downtrending. DDx passed stone/sludge vs. DILI. Hep serologies negative.    2. CBD dilation/gallbladder distention on CT chest: this was not present on prior imaging (See CT abdomen 10/18). Most likely due to biliary sludge/choledocho. No signs of cholangitis at this time.    3. Cognitive impairment/Seizure d/o.    4.Hypernatremia    Recommendation:  -consider EUS tomorrow as patient cannot tolerate other modalities of biliary imaging. Her sodium would need to be corrected to undergo anesthesia.
Impression:  1) Mild elevated LFTs - could be 2/2 cholecystitis vs passed stone vs intrinsic liver dz.  S/p EUS yesterday, normal bile duct without choledocolithiasis  2) Cholelithiasis and thickened gallbladder wall - could have mild acute cholecystitis    Recs:  - no further GI interventions  - would consult gen surg for consideration of CCY  - trend CBC, CMP, INR  - diet as tolerated from GI perspective  - further care per primary team
52 yo F with CP (non verbal, does not ambulate), intractable seizures, [facility] c/b transaminitis and RUQ pain s/p EUS on 3/4/19 with concern for mild acute cholecystitis s/p HIDA scan with no evidence of acute cholecystitis seen by surgery no plan for surgical intervention--recently increased seizures.
54 yo woman w/ PMH of CP (non verbal, does not ambulate), intractable seizures, [facility]opathy
52 yo F with CP (non verbal, does not ambulate), intractable seizures, [facility] c/b transaminitis and RUQ pain s/p EUS on 3/4/19 with concern for mild acute cholecystitis s/p HIDA scan with no evidence of acute cholecystitis seen by surgery no plan for surgical intervention--recently increased seizures.
52 yo woman w/ PMH of CP (non verbal, does not ambulate), intractable seizures, [facility]opathy
54 yo F with CP (non verbal, does not ambulate), intractable seizures, [facility] c/b transaminitis and RUQ pain s/p EUS on 3/4/19 with concern for mild acute cholecystitis s/p HIDA scan with no evidence of acute cholecystitis.
53F h/o CP (non verbal, does not ambulate), intractable seizures, [facility] c/b transaminitis and RUQ pain s/p EUS on 3/4/19 with concern for mild acute cholecystitis s/p HIDA scan with no evidence of acute cholecystitis.
53F h/o CP (non verbal, does not ambulate), intractable seizures, [facility] c/b transaminitis and RUQ pain s/p EUS on 3/4/19 with concern for mild acute cholecystitis s/p HIDA scan with no evidence of acute cholecystitis.
52 yo woman w/ PMH of CP (non verbal, does not ambulate), intractable seizures, [facility]opathy
52 yo woman w/ PMH of CP (non verbal, does not ambulate), intractable seizures, [facility]opathy
53F h/o CP (non verbal, does not ambulate), intractable seizures, [facility]opathy course c/b transaminitis and RUQ pain s/p EUS on 3/4/19 with concern for mild acute cholecystitis s/p HIDA scan with no evidence of acute cholecystitis.
53F h/o CP (non verbal, does not ambulate), intractable seizures, [facility] c/b transaminitis and RUQ pain s/p EUS on 3/4/19 with concern for mild acute cholecystitis s/p HIDA scan with no evidence of acute cholecystitis.
53F h/o CP (non verbal, does not ambulate), intractable seizures, [facility]opathy course c/b transaminitis and RUQ pain.
53F h/o CP (non verbal, does not ambulate), intractable seizures, [facility] c/b transaminitis and RUQ pain s/p EUS on 3/4/19 with concern for mild acute cholecystitis.
53F h/o CP (non verbal, does not ambulate), intractable seizures, [facility] c/b transaminitis and RUQ pain s/p EUS on 3/4/19 with concern for mild acute cholecystitis s/p HIDA scan with no evidence of acute cholecystitis.

## 2019-03-15 NOTE — PROGRESS NOTE ADULT - NSICDXPROBLEM_GEN_ALL_CORE_FT
PROBLEM DIAGNOSES  Problem: Seizure disorder  Assessment and Plan: Patient still with intermittent seizures  - c/w Keppra  - c/w dilantin, levels remain low, f/u neurology  - meds changed to PO, appears to be tolerating     Problem: Acute cholecystitis  Assessment and Plan: Seen by GI and surgery, no plan for intervention  - zosyn x 7 days, now on augmentin day 8 of 10 day course    Problem: Swelling of right upper extremity  Assessment and Plan: US negative for DVT  - monitor     Problem: Severe protein-calorie malnutrition  Assessment and Plan: - appreciate nutrition eval     Problem: Functional quadriplegia  Assessment and Plan: - c/w full care--turns, assistence with meals     Problem: Cerebral palsy, unspecified type  Assessment and Plan: - c/w supportive care  - c/w seizure meds     Problem: Dysphagia, unspecified type  Assessment and Plan: - c/w dysphagia diet  - assitance with meals  - per prior discussions mother not interested in PEG and currently not needed     Problem: Lung nodule  Assessment and Plan: - repeat CT chest in 3 months     Problem: Prophylactic measure  Assessment and Plan: c/w lovenox
PROBLEM DIAGNOSES  Problem: Seizure disorder  Assessment and Plan: Patient still with intermittent seizures  - c/w Keppra  - changed to vimpat   - concern about consistent taking of meds     Problem: Acute cholecystitis  Assessment and Plan: Seen by GI and surgery, no plan for intervention  - zosyn x 7 days, now on augmentin day 9 of 10 day course    Problem: Swelling of right upper extremity  Assessment and Plan: US negative for DVT  - monitor     Problem: Severe protein-calorie malnutrition  Assessment and Plan: - appreciate nutrition eval     Problem: Functional quadriplegia  Assessment and Plan: - c/w full care--turns, assistence with meals     Problem: Cerebral palsy, unspecified type  Assessment and Plan: - c/w supportive care  - c/w seizure meds     Problem: Dysphagia, unspecified type  Assessment and Plan: - c/w dysphagia diet  - assitance with meals  - per prior discussions mother not interested in PEG and currently not needed     Problem: Lung nodule  Assessment and Plan: - repeat CT chest in 3 months     Problem: Prophylactic measure  Assessment and Plan: c/w lovenox
PROBLEM DIAGNOSES  Problem: Seizure disorder  Assessment and Plan: Patient still with intermittent seizures  - c/w Keppra 1000 mg BID  - c/w vimpat 100 mg BID  - concern about consistent taking of meds as refuses PO intermittently however per group home this is not new and re-attempts need to be made throughout day  - missing meds places her at risk for seizures     Problem: Functional quadriplegia  Assessment and Plan: - c/w full care--turns, assistence with meals     Problem: Cerebral palsy, unspecified type  Assessment and Plan: - c/w supportive care  - c/w seizure meds     Problem: Dysphagia, unspecified type  Assessment and Plan: - c/w dysphagia diet  - assitance with meals  - per prior discussions mother not interested in PEG and currently not needed would need OPWDD    Problem: Lung nodule  Assessment and Plan: - repeat CT chest in 3 months     Problem: Prophylactic measure  Assessment and Plan: c/w lovenox
PROBLEM DIAGNOSES  Problem: Seizure disorder  Assessment and Plan: Patient still with intermittent seizures  - c/w Keppra  - c/w vimpat   - concern about consistent taking of meds as refuses PO intermittently    Problem: Acute cholecystitis  Assessment and Plan: Seen by GI and surgery, no plan for intervention  - zosyn x 7 days, now on augmentin day 10 of 10 day course, to end today 3/14    Problem: Swelling of right upper extremity  Assessment and Plan: US negative for DVT  - monitor     Problem: Severe protein-calorie malnutrition  Assessment and Plan: - appreciate nutrition eval     Problem: Functional quadriplegia  Assessment and Plan: - c/w full care--turns, assistence with meals     Problem: Cerebral palsy, unspecified type  Assessment and Plan: - c/w supportive care  - c/w seizure meds     Problem: Dysphagia, unspecified type  Assessment and Plan: - c/w dysphagia diet  - assitance with meals  - per prior discussions mother not interested in PEG and currently not needed would need OPWDD    Problem: Lung nodule  Assessment and Plan: - repeat CT chest in 3 months     Problem: Prophylactic measure  Assessment and Plan: c/w lovenox

## 2019-03-15 NOTE — PROGRESS NOTE ADULT - REASON FOR ADMISSION
abd pain
agitation
Encephalopathy, Seizures
seizures
encephalopathy
cholecystitis. seizure
encephalopathy, cholecystitis, seizures
agitation/UTI
encephalopathy

## 2019-03-15 NOTE — DISCHARGE NOTE NURSING/CASE MANAGEMENT/SOCIAL WORK - NSDCDPATPORTLINK_GEN_ALL_CORE
You can access the [facility] Portal, offered by [facility], by registering with the following website: [email or web address]

## 2019-03-15 NOTE — PROGRESS NOTE ADULT - PROVIDER SPECIALTY LIST ADULT
Hospitalist
Gastroenterology
Gastroenterology
Hospitalist

## 2019-03-15 NOTE — PROGRESS NOTE ADULT - ATTENDING COMMENTS
Care d/w  Ms Barlow as well as mother re: meds and PO intake etc  Mother still not interested in PEG   dispo to group home  dispo time 37min

## 2019-03-19 ENCOUNTER — INPATIENT (INPATIENT)
Facility: HOSPITAL | Age: 54
LOS: 20 days | Discharge: NURSING, REHAB OR LONG-TERM CARE | End: 2019-04-09
Attending: HOSPITALIST | Admitting: HOSPITALIST
Payer: MEDICARE

## 2019-03-19 VITALS
RESPIRATION RATE: 16 BRPM | TEMPERATURE: 98 F | OXYGEN SATURATION: 96 % | HEART RATE: 95 BPM | DIASTOLIC BLOOD PRESSURE: 69 MMHG | SYSTOLIC BLOOD PRESSURE: 119 MMHG

## 2019-03-19 DIAGNOSIS — Z29.9 ENCOUNTER FOR PROPHYLACTIC MEASURES, UNSPECIFIED: ICD-10-CM

## 2019-03-19 DIAGNOSIS — R56.9 UNSPECIFIED CONVULSIONS: ICD-10-CM

## 2019-03-19 DIAGNOSIS — R53.2 FUNCTIONAL QUADRIPLEGIA: ICD-10-CM

## 2019-03-19 DIAGNOSIS — R82.71 BACTERIURIA: ICD-10-CM

## 2019-03-19 DIAGNOSIS — R62.7 ADULT FAILURE TO THRIVE: ICD-10-CM

## 2019-03-19 DIAGNOSIS — R74.0 NONSPECIFIC ELEVATION OF LEVELS OF TRANSAMINASE AND LACTIC ACID DEHYDROGENASE [LDH]: ICD-10-CM

## 2019-03-19 DIAGNOSIS — G80.9 CEREBRAL PALSY, UNSPECIFIED: ICD-10-CM

## 2019-03-19 LAB
ALBUMIN SERPL ELPH-MCNC: 3.9 G/DL — SIGNIFICANT CHANGE UP (ref 3.3–5)
ALP SERPL-CCNC: 98 U/L — SIGNIFICANT CHANGE UP (ref 40–120)
ALT FLD-CCNC: 58 U/L — HIGH (ref 4–33)
ANION GAP SERPL CALC-SCNC: 10 MMO/L — SIGNIFICANT CHANGE UP (ref 7–14)
APPEARANCE UR: CLEAR — SIGNIFICANT CHANGE UP
AST SERPL-CCNC: 55 U/L — HIGH (ref 4–32)
BACTERIA # UR AUTO: HIGH
BASOPHILS # BLD AUTO: 0.04 K/UL — SIGNIFICANT CHANGE UP (ref 0–0.2)
BASOPHILS NFR BLD AUTO: 0.5 % — SIGNIFICANT CHANGE UP (ref 0–2)
BILIRUB SERPL-MCNC: 0.3 MG/DL — SIGNIFICANT CHANGE UP (ref 0.2–1.2)
BILIRUB UR-MCNC: SIGNIFICANT CHANGE UP
BLOOD UR QL VISUAL: SIGNIFICANT CHANGE UP
BUN SERPL-MCNC: 11 MG/DL — SIGNIFICANT CHANGE UP (ref 7–23)
CALCIUM SERPL-MCNC: 9.4 MG/DL — SIGNIFICANT CHANGE UP (ref 8.4–10.5)
CHLORIDE SERPL-SCNC: 101 MMOL/L — SIGNIFICANT CHANGE UP (ref 98–107)
CO2 SERPL-SCNC: 33 MMOL/L — HIGH (ref 22–31)
COLOR SPEC: YELLOW — SIGNIFICANT CHANGE UP
CREAT SERPL-MCNC: 0.52 MG/DL — SIGNIFICANT CHANGE UP (ref 0.5–1.3)
EOSINOPHIL # BLD AUTO: 0.04 K/UL — SIGNIFICANT CHANGE UP (ref 0–0.5)
EOSINOPHIL NFR BLD AUTO: 0.5 % — SIGNIFICANT CHANGE UP (ref 0–6)
EPI CELLS # UR: SIGNIFICANT CHANGE UP
GLUCOSE SERPL-MCNC: 97 MG/DL — SIGNIFICANT CHANGE UP (ref 70–99)
GLUCOSE UR-MCNC: NEGATIVE — SIGNIFICANT CHANGE UP
HCT VFR BLD CALC: 45.4 % — HIGH (ref 34.5–45)
HGB BLD-MCNC: 13.9 G/DL — SIGNIFICANT CHANGE UP (ref 11.5–15.5)
IMM GRANULOCYTES NFR BLD AUTO: 0.9 % — SIGNIFICANT CHANGE UP (ref 0–1.5)
KETONES UR-MCNC: SIGNIFICANT CHANGE UP
LEUKOCYTE ESTERASE UR-ACNC: NEGATIVE — SIGNIFICANT CHANGE UP
LYMPHOCYTES # BLD AUTO: 1.72 K/UL — SIGNIFICANT CHANGE UP (ref 1–3.3)
LYMPHOCYTES # BLD AUTO: 20.9 % — SIGNIFICANT CHANGE UP (ref 13–44)
MAGNESIUM SERPL-MCNC: 2 MG/DL — SIGNIFICANT CHANGE UP (ref 1.6–2.6)
MCHC RBC-ENTMCNC: 30.6 % — LOW (ref 32–36)
MCHC RBC-ENTMCNC: 31.3 PG — SIGNIFICANT CHANGE UP (ref 27–34)
MCV RBC AUTO: 102.3 FL — HIGH (ref 80–100)
MONOCYTES # BLD AUTO: 1.33 K/UL — HIGH (ref 0–0.9)
MONOCYTES NFR BLD AUTO: 16.2 % — HIGH (ref 2–14)
MUCOUS THREADS # UR AUTO: SIGNIFICANT CHANGE UP
NEUTROPHILS # BLD AUTO: 5.03 K/UL — SIGNIFICANT CHANGE UP (ref 1.8–7.4)
NEUTROPHILS NFR BLD AUTO: 61 % — SIGNIFICANT CHANGE UP (ref 43–77)
NITRITE UR-MCNC: NEGATIVE — SIGNIFICANT CHANGE UP
NRBC # FLD: 0 K/UL — LOW (ref 25–125)
PH UR: 7 — SIGNIFICANT CHANGE UP (ref 5–8)
PHOSPHATE SERPL-MCNC: 2.7 MG/DL — SIGNIFICANT CHANGE UP (ref 2.5–4.5)
PLATELET # BLD AUTO: 275 K/UL — SIGNIFICANT CHANGE UP (ref 150–400)
PMV BLD: 11.4 FL — SIGNIFICANT CHANGE UP (ref 7–13)
POTASSIUM SERPL-MCNC: 3.8 MMOL/L — SIGNIFICANT CHANGE UP (ref 3.5–5.3)
POTASSIUM SERPL-SCNC: 3.8 MMOL/L — SIGNIFICANT CHANGE UP (ref 3.5–5.3)
PROT SERPL-MCNC: 8 G/DL — SIGNIFICANT CHANGE UP (ref 6–8.3)
PROT UR-MCNC: 300 — HIGH
RBC # BLD: 4.44 M/UL — SIGNIFICANT CHANGE UP (ref 3.8–5.2)
RBC # FLD: 13.5 % — SIGNIFICANT CHANGE UP (ref 10.3–14.5)
RBC CASTS # UR COMP ASSIST: SIGNIFICANT CHANGE UP (ref 0–?)
SODIUM SERPL-SCNC: 144 MMOL/L — SIGNIFICANT CHANGE UP (ref 135–145)
SP GR SPEC: 1.02 — SIGNIFICANT CHANGE UP (ref 1–1.04)
UROBILINOGEN FLD QL: NORMAL — SIGNIFICANT CHANGE UP
WBC # BLD: 8.23 K/UL — SIGNIFICANT CHANGE UP (ref 3.8–10.5)
WBC # FLD AUTO: 8.23 K/UL — SIGNIFICANT CHANGE UP (ref 3.8–10.5)
WBC UR QL: HIGH (ref 0–?)

## 2019-03-19 PROCEDURE — 71045 X-RAY EXAM CHEST 1 VIEW: CPT | Mod: 26

## 2019-03-19 RX ORDER — FERROUS SULFATE 325(65) MG
325 TABLET ORAL DAILY
Refills: 0 | Status: DISCONTINUED | OUTPATIENT
Start: 2019-03-19 | End: 2019-04-09

## 2019-03-19 RX ORDER — DOCUSATE SODIUM 100 MG
100 CAPSULE ORAL THREE TIMES A DAY
Refills: 0 | Status: DISCONTINUED | OUTPATIENT
Start: 2019-03-19 | End: 2019-04-09

## 2019-03-19 RX ORDER — BUDESONIDE, MICRONIZED 100 %
0.5 POWDER (GRAM) MISCELLANEOUS DAILY
Refills: 0 | Status: DISCONTINUED | OUTPATIENT
Start: 2019-03-19 | End: 2019-04-09

## 2019-03-19 RX ORDER — OLANZAPINE 15 MG/1
7.5 TABLET, FILM COATED ORAL AT BEDTIME
Refills: 0 | Status: DISCONTINUED | OUTPATIENT
Start: 2019-03-19 | End: 2019-04-09

## 2019-03-19 RX ORDER — LEVETIRACETAM 250 MG/1
1500 TABLET, FILM COATED ORAL
Refills: 0 | Status: DISCONTINUED | OUTPATIENT
Start: 2019-03-19 | End: 2019-03-21

## 2019-03-19 RX ORDER — PANTOPRAZOLE SODIUM 20 MG/1
40 TABLET, DELAYED RELEASE ORAL
Refills: 0 | Status: DISCONTINUED | OUTPATIENT
Start: 2019-03-19 | End: 2019-04-09

## 2019-03-19 RX ORDER — LEVETIRACETAM 250 MG/1
3 TABLET, FILM COATED ORAL
Qty: 84 | Refills: 0
Start: 2019-03-19 | End: 2019-04-01

## 2019-03-19 RX ORDER — ENOXAPARIN SODIUM 100 MG/ML
40 INJECTION SUBCUTANEOUS EVERY 24 HOURS
Refills: 0 | Status: DISCONTINUED | OUTPATIENT
Start: 2019-03-19 | End: 2019-04-09

## 2019-03-19 RX ORDER — FLUTICASONE PROPIONATE 50 MCG
1 SPRAY, SUSPENSION NASAL
Refills: 0 | Status: DISCONTINUED | OUTPATIENT
Start: 2019-03-19 | End: 2019-04-09

## 2019-03-19 RX ORDER — LACOSAMIDE 50 MG/1
100 TABLET ORAL ONCE
Refills: 0 | Status: DISCONTINUED | OUTPATIENT
Start: 2019-03-19 | End: 2019-03-19

## 2019-03-19 RX ORDER — FOLIC ACID 0.8 MG
1 TABLET ORAL DAILY
Refills: 0 | Status: DISCONTINUED | OUTPATIENT
Start: 2019-03-19 | End: 2019-04-09

## 2019-03-19 RX ORDER — SODIUM CHLORIDE 9 MG/ML
1000 INJECTION, SOLUTION INTRAVENOUS ONCE
Refills: 0 | Status: COMPLETED | OUTPATIENT
Start: 2019-03-19 | End: 2019-03-19

## 2019-03-19 RX ORDER — LEVETIRACETAM 250 MG/1
1500 TABLET, FILM COATED ORAL ONCE
Refills: 0 | Status: COMPLETED | OUTPATIENT
Start: 2019-03-19 | End: 2019-03-19

## 2019-03-19 RX ORDER — LACOSAMIDE 50 MG/1
100 TABLET ORAL
Refills: 0 | Status: DISCONTINUED | OUTPATIENT
Start: 2019-03-19 | End: 2019-03-21

## 2019-03-19 RX ORDER — ASPIRIN/CALCIUM CARB/MAGNESIUM 324 MG
81 TABLET ORAL DAILY
Refills: 0 | Status: DISCONTINUED | OUTPATIENT
Start: 2019-03-19 | End: 2019-04-09

## 2019-03-19 RX ADMIN — LACOSAMIDE 120 MILLIGRAM(S): 50 TABLET ORAL at 21:23

## 2019-03-19 RX ADMIN — SODIUM CHLORIDE 1000 MILLILITER(S): 9 INJECTION, SOLUTION INTRAVENOUS at 17:10

## 2019-03-19 RX ADMIN — LEVETIRACETAM 400 MILLIGRAM(S): 250 TABLET, FILM COATED ORAL at 20:57

## 2019-03-19 RX ADMIN — Medication 1 MILLIGRAM(S): at 15:47

## 2019-03-19 NOTE — ED PROVIDER NOTE - NSFOLLOWUPINSTRUCTIONS_ED_ALL_ED_FT
Follow up with a neurologist within one week    Return to the ER if you have any grand mal seizures (Entire body shaking), fevers, vomiting, or any other concerning symptoms.    Take keppra 1500mg 2 times per day. Continue to take all other medications as previously prescribed

## 2019-03-19 NOTE — H&P ADULT - NSICDXPASTMEDICALHX_GEN_ALL_CORE_FT
PAST MEDICAL HISTORY:  Cerebral palsy     Constipation     Intellectual disability     Seizure disorder

## 2019-03-19 NOTE — CONSULT NOTE ADULT - NSHPATTENDINGPLANDISCUSS_GEN_ALL_CORE
neurology resident as above and I agree with assessment and plan as outlined. Outpatient neurology follow up given. patient aide and medical and neurology teams.

## 2019-03-19 NOTE — ED ADULT NURSE NOTE - NSIMPLEMENTINTERV_GEN_ALL_ED
Implemented All Fall Risk Interventions:  [place] to call system. Call bell, personal items and telephone within reach. Instruct patient to call for assistance. Room bathroom lighting operational. Non-slip footwear when patient is off stretcher. Physically safe environment: no spills, clutter or unnecessary equipment. Stretcher in lowest position, wheels locked, appropriate side rails in place. Provide visual cue, wrist band, yellow gown, etc. Monitor gait and stability. Monitor for mental status changes and reorient to person, place, and time. Review medications for side effects contributing to fall risk. Reinforce activity limits and safety measures with patient and family.

## 2019-03-19 NOTE — ED ADULT NURSE REASSESSMENT NOTE - NS ED NURSE REASSESS COMMENT FT1
Pt straight cath for urine. UA sent. Scant amount of urine expelled during catheterization. MD aware. Will monitor.

## 2019-03-19 NOTE — H&P ADULT - NSICDXPROBLEM_GEN_ALL_CORE_FT
PROBLEM DIAGNOSES  Problem: Seizures  Assessment and Plan: PROBLEM DIAGNOSES  Problem: Seizures  Assessment and Plan: pt now with increased seizures. seen by neuro who suggested increasing medications  -start Keppra 1500mgBID (switched to solution in setting of dysphagia)  -sstart Vimpat 100 BID (switched to solution in setting of dysphadia)  -c/w homw Olanzapine  -appreciate neuro recs      Problem: Adult failure to thrive  Assessment and Plan: 2/2 dysphagia. Aids reporting that pt has not been eating at home and has been more lethargic than baseline.   -will start a dysphagia diet  -aspiration precautions  -prior discussions with mother appear that she is against PEG at this time, if it becomes and issue, would readress  -nutrition c/s    Problem: Cerebral palsy  Assessment and Plan: non verbal, non walking at baseline  -supportive measures    Problem: Functional quadriplegia  Assessment and Plan: -turn regularly to prevent ulcers  -assistance with meals    Problem: Need for prophylactic measure  Assessment and Plan: VTE: lovenox  Diet: Soft, low fiber, DASH, low fat PROBLEM DIAGNOSES  Problem: Seizures  Assessment and Plan: pt now with increased seizures. seen by neuro who suggested increasing medications  -start Keppra 1500mgBID (switched to solution in setting of dysphagia)  -sstart Vimpat 100 BID (switched to solution in setting of dysphadia)  -c/w homw Olanzapine  -appreciate neuro recs      Problem: Adult failure to thrive  Assessment and Plan: 2/2 dysphagia. Aids reporting that pt has not been eating at home and has been more lethargic than baseline.   -will start a dysphagia diet  -aspiration precautions  -prior discussions with mother appear that she is against PEG at this time, if it becomes and issue, would readress  -nutrition c/s    Problem: Cerebral palsy  Assessment and Plan: non verbal, non walking at baseline  -supportive measures    Problem: Functional quadriplegia  Assessment and Plan: -turn regularly to prevent ulcers  -assistance with meals    Problem: ASB (asymptomatic bacteriuria)  Assessment and Plan: pt w/ bacteria ans WBC in urine, but no symtopoms, no tenderness in suprapubic area, positive epithelia cells  -will montior for now    Problem: Transaminitis  Assessment and Plan: appear to be chronic, likely in setting of medications  -will recheck as outpt    Problem: Need for prophylactic measure  Assessment and Plan: VTE: lovenox  Diet: Soft, low fiber, DASH, low fat PROBLEM DIAGNOSES  Problem: Seizures  Assessment and Plan: pt now with increased seizures. seen by neuro who suggested increasing medications    -start Keppra 1500mgBID (switched to solution in setting of dysphagia)    -sstart Vimpat 100 BID (switched to solution in setting of dysphadia)    -c/w homw Olanzapine    -appreciate neuro recs        Problem: Adult failure to thrive  Assessment and Plan: secondary to dysphagia. Aids reporting that patient has not been eating at home and has been more lethargic than baseline.     -will restart dysphagia diet    -aspiration precautions    -prior discussions with mother (per EMR) appear that she is against PEG at this time, if it becomes and issue, would readress    -nutrition c/s    Problem: Functional quadriplegia  Assessment and Plan: -turn regularly to prevent ulcers    -assistance with meals    Problem: ASB (asymptomatic bacteriuria)  Assessment and Plan: patient w/ bacteria and WBC in urine, asymptomatic -> no tenderness in suprapubic area, positive epithelia cells (likely contaminated)   -will monitor for now    Problem: Transaminitis  Assessment and Plan: appear to be chronic, likely in setting of medications    -recheck as outpt    Problem: Cerebral palsy  Assessment and Plan: non verbal, non-ambulatory at baseline    -supportive measures    Problem: Need for prophylactic measure  Assessment and Plan: VTE: lovenox    Diet: Soft, low fiber, DASH, low fat

## 2019-03-19 NOTE — H&P ADULT - HISTORY OF PRESENT ILLNESS
53F pmh CP nonverbal at baseline) presenting from care home with seizures.     In the ED:   Vitals: 97.6, 95, 119/69, 96% on RA  Notable Labs and Imaging: CXR negative, U/A negative  Given: 1L NS, Vimpat 100, Kepra 1500 53F pmh CP nonverbal at baseline) presenting from care home with seizures. Information was attained from charts and home aid as pt is nonverbal. As per aid, they have noticed that the pt has not been herself for 2-3 weeks now. Aids reports that after being hospitalized from 2/27-3/15 (acuate choli), she has not been herself. They noted that at any given time, pt has been having a seizure, 10 secs in duration, every 20-30 mins. They note that pt has a post ictal period of 2 mins before returning to baseline. Aids also report that they have has issues administering medication as pt refuses to swallow pills. They note that pt's PO intake has decreased. Aids deny fevers, chilld, n/v/c/d, abd pain, SOB.    Of note: pt hospitalized 2/27-3/15 w/ acute choli which resolved with abx course.    In the ED:   Vitals: 97.6, 95, 119/69, 96% on RA  Notable Labs and Imaging: CXR negative, U/A negative  Given: 1L NS, Vimpat 100, Kepra 1500 53F w/CP (non-verbal, non-ambulatory at baseline) presenting from care home with seizures. Information was attained from charts and home aid as pt is nonverbal. As per aid, they have noticed that the pt has not been herself for 2-3 weeks now. Aids reports that after being hospitalized from 2/27-3/15 (worked up for possible acuate choli), she has not been herself. They noted that at any given time, pt has been having a seizure, 10 secs in duration, every 20-30 mins. They note that pt has a post ictal period of 2 mins before returning to baseline. Seizures reportedly begin with a shriek and then tonic clonic movements.  Aids also report that they have has issues administering medication as pt refuses to swallow pills. They note that pt's PO intake has decreased especially over past 2 days. Aids denies any known fevers, chills, n/v/c/d, abd pain, shortness of breath.    Of note: pt hospitalized 2/27-3/15 w/abdominal pain (worked up for acute choli) resolved with abx course.    In the ED:   Vitals: 97.6, 95, 119/69, 96% on RA  Notable Labs and Imaging: CXR negative, U/A negative  Given: 1L NS, Vimpat 100, Kepra 1500

## 2019-03-19 NOTE — H&P ADULT - NSHPLABSRESULTS_GEN_ALL_CORE
13.9   8.23  )-----------( 275      ( 19 Mar 2019 14:48 )             45.4           144  |  101  |  11  ----------------------------<  97  3.8   |  33<H>  |  0.52    Ca    9.4      19 Mar 2019 15:08  Phos  2.7       Mg     2.0         TPro  8.0  /  Alb  3.9  /  TBili  0.3  /  DBili  x   /  AST  55<H>  /  ALT  58<H>  /  AlkPhos  98      Urinalysis Basic - ( 19 Mar 2019 18:30 )  Color: YELLOW / Appearance: CLEAR / S.020 / pH: 7.0  Gluc: NEGATIVE / Ketone: TRACE  / Bili: SMALL / Urobili: NORMAL   Blood: TRACE / Protein: 300 / Nitrite: NEGATIVE   Leuk Esterase: NEGATIVE / RBC: 3-5 / WBC 6-10   Sq Epi: x / Non Sq Epi: MODERATE / Bacteria: MODERATE    Lactate Trend  Blood Gas Venous - Lactate: 2.0: Please note updated reference range. mmol/L (19 @ 18:17)    < from: Xray Chest 1 View- PORTABLE-Urgent (19 @ 15:35) >    IMPRESSION:   Low lung volumes.    Redemonstrated uniform hazy opacification of peripheral lower left   hemithorax probably corresponds to epicardial fat rather than pleural   effusion or patchy airspace consolidation. Sharp right CP angle. Clear   remaining visualized lungs. No pneumothorax.    Heart size and mediastinal width inaccurately assessed on this projection.    Osteopenic appearing osseous structures for age, however, bone   mineralization more accurately assessed with densitometry. Mild spinal   degenerative change.     < end of copied text > 13.9   8.23  )-----------( 275      ( 19 Mar 2019 14:48 )             45.4           144  |  101  |  11  ----------------------------<  97  3.8   |  33<H>  |  0.52    Ca    9.4      19 Mar 2019 15:08  Phos  2.7       Mg     2.0         TPro  8.0  /  Alb  3.9  /  TBili  0.3  /  DBili  x   /  AST  55<H>  /  ALT  58<H>  /  AlkPhos  98      Urinalysis Basic - ( 19 Mar 2019 18:30 )  Color: YELLOW / Appearance: CLEAR / S.020 / pH: 7.0  Gluc: NEGATIVE / Ketone: TRACE  / Bili: SMALL / Urobili: NORMAL   Blood: TRACE / Protein: 300 / Nitrite: NEGATIVE   Leuk Esterase: NEGATIVE / RBC: 3-5 / WBC 6-10   Sq Epi: x / Non Sq Epi: MODERATE / Bacteria: MODERATE    Lactate Trend  Blood Gas Venous - Lactate: 2.0 mmol/L (19 @ 18:17)    < from: Xray Chest 1 View- PORTABLE-Urgent (19 @ 15:35) >  IMPRESSION: Low lung volumes. Redemonstrated uniform hazy opacification of peripheral lower left hemithorax probably corresponds to epicardial fat rather than pleural effusion or patchy airspace consolidation. Sharp right CP angle. Clear remaining visualized lungs. No pneumothorax. Heart size and mediastinal width inaccurately assessed on this projection. Osteopenic appearing osseous structures for age, however, bone mineralization more accurately assessed with densitometry. Mild spinal degenerative change.   < end of copied text >

## 2019-03-19 NOTE — CONSULT NOTE ADULT - ASSESSMENT
52 yo female w/ PMHx of CP(non verbal, does not ambulate), intractable seizures [facility] w/ increased seizure frequency since yesterday. Patient's aide is at bedside, and reports seizures yesterday and today, increased from her baseline of approx. 1 seizure per month. Her aide reports patient taking her AED this morning, but does not know about recent compliance, as patient has recent history of medication and PO refusal. No fever, chills, SOB, CP. Pt does not ambulate at baseline, quadriplegic. Patient was recently at [facility] for seizures and AMS.     Etiology: breakthrough seizures of unknown etiology    Plan  [] can increase keppra to 1500 BID   [] keep Vimpat 100 mg BID  [] sepsis workup: UA, Chest Xray  [] Keppra level  [] continue home medications   [] rest of management per ED team

## 2019-03-19 NOTE — CONSULT NOTE ADULT - SUBJECTIVE AND OBJECTIVE BOX
History obtained from chart and aide at bedside patient is nonverbal at baseline. Patient has had an increase in seizures since yesterday, including seizures observed in the ED by the physician. Seizures appear to be extremity jerking and eye rolling and last <5 min. Her aide reports that she took her medications this morning, but does not know about her general compliance recently with her medications. At the last hospitalization, patient had decreased PO intake, which her aide reports at this time as well. She reports patient has been accepting some liquids, but continues to eat minimally. Patient was last discharged on Vimpat 100 mg BID and Keppra 1000 mg BID as per last neurology chart note on 3/12/2019. Aide denies any recent fevers, chills, constitutional symptoms.     PAST MEDICAL & SURGICAL HISTORY:  Intellectual disability  Constipation  Seizure disorder  Cerebral palsy  No significant past surgical history    FAMILY HISTORY:  No pertinent family history in first degree relatives    Allergies  Topamax (Unknown)    Review of Systems:  CONSTITUTIONAL:  No weight loss, fever, chills, weakness or fatigue.  HEENT:  Eyes:  No visual loss, blurred vision, double vision or yellow sclerae. Ears, Nose, Throat:  No hearing loss, sneezing, congestion, runny nose or sore throat.  CARDIOVASCULAR:  No chest pain, chest pressure or chest discomfort. No palpitations or edema.  GASTROINTESTINAL:  Some continued decreased PO intake. No nausea, vomiting or diarrhea. No abdominal pain or blood.  NEUROLOGICAL: See HPI  MUSCULOSKELETAL:  No muscle, back pain, joint pain or stiffness.  PSYCHIATRIC:  No history of depression or anxiety.    Vital Signs Last 24 Hrs  T(C): 36.4 (19 Mar 2019 11:52), Max: 36.4 (19 Mar 2019 11:52)  T(F): 97.6 (19 Mar 2019 11:52), Max: 97.6 (19 Mar 2019 11:52)  HR: 70 (19 Mar 2019 15:44) (70 - 95)  BP: 120/67 (19 Mar 2019 15:44) (119/69 - 120/67)  RR: 16 (19 Mar 2019 15:44) (16 - 16)  SpO2: 95% (19 Mar 2019 15:44) (95% - 96%)    General Exam:   General appearance: No acute distress                 Neurological Exam:  Mental Status: opens eyes to name, nonverbal, not following commands    Cranial Nerves: no facial asymmetry, gets aggressive/agitated when attempted to examine pupils. able to track from one side of bed to the other    Motor:   Tone: normal.                  Strength: will not follow commands/participate in exam but is moving extremities spontaneously  Pronator drift: cannot assess               Dysmetria: cannot assess  No truncal ataxia.    Tremor: No resting, postural or action tremor.  No myoclonus.  Toes mute  Gait: deferred    Other:  Radiology  CTh: negative

## 2019-03-19 NOTE — ED ADULT NURSE NOTE - OBJECTIVE STATEMENT
Pt received to room 1 sleeping but arousable to tactile stimuli accompanied by aide. As per aide, pt with seizures since yesterday and compliant with meds. Pt is non-verbal and non-ambulatory and at baseline. Diaper is wet and pt cleaned and changed - Skin is warm, dry and intact.

## 2019-03-19 NOTE — ED PROVIDER NOTE - NSFOLLOWUPCLINICS_GEN_ALL_ED_FT
[facility]  Neurology  [address] [place] [address] [address] [place] [address]  Phone: [phone]  Fax:   Follow Up Time:

## 2019-03-19 NOTE — ED PROVIDER NOTE - CLINICAL SUMMARY MEDICAL DECISION MAKING FREE TEXT BOX
52 yo female presenting with seizures; hx of seizures likely related to disease; will get basics keppra level neuro consult Maxwell: 54 yo female presenting with seizures; hx of seizures likely related to disease; will get basics keppra level neuro consult

## 2019-03-19 NOTE — ED PROVIDER NOTE - PHYSICAL EXAMINATION
gen: well appearing  Mentation: AAO x 0  ENT: airway patent  Eyes: conjunctivae clear bilaterally  Cardio: RRR, no m/r/g  Resp: normal BS b/l  GI: s/nt/nd   Neuro: AAO x 0  MSK: in wheelchair difficult to assess motor function of extremities gen: well appearing  Mentation: AAO x 0  ENT: airway patent  Eyes: conjunctivae clear bilaterally  Cardio: RRR, no m/r/g  Resp: normal BS b/l  GI: s/nt/nd   Neuro: AAO x 0  MSK: in wheelchair difficult to assess motor function of extremities    [place]:  Neuro: no signs of active seizing

## 2019-03-19 NOTE — H&P ADULT - ATTENDING COMMENTS
Agree with resident H&P and plan as edited above. Appreciate neuro recs.  HHVALERI at bedside reports to me seizures [id number], has jerky movements of b/l UE at baseline, per aid patient was at her baseline on my exam, not following commands, non-verbal, reported decreased PO intake x2days, however she has been able to get patient to drink ensure prior to my exam. In accepting this patient earlier from the ED, ED reported to me that patient aid stated they would bring patient back if she were discharged from the ED, leading to her current admission. F/u further neuro recs. Encourage PO intake. F/u levetiracetam level. Prior admission EMR reviewed.     #macrocytosis without anemia  -recent TSH wnl  -if repeat labs required check B12/folate, otherwise repeat CBC as outpatient for further w/u

## 2019-03-20 PROCEDURE — 99223 1ST HOSP IP/OBS HIGH 75: CPT | Mod: GC

## 2019-03-20 PROCEDURE — 99222 1ST HOSP IP/OBS MODERATE 55: CPT | Mod: GC

## 2019-03-20 PROCEDURE — 12345: CPT | Mod: NC

## 2019-03-20 RX ADMIN — Medication 81 MILLIGRAM(S): at 11:49

## 2019-03-20 RX ADMIN — Medication 1 DROP(S): at 18:13

## 2019-03-20 RX ADMIN — PANTOPRAZOLE SODIUM 40 MILLIGRAM(S): 20 TABLET, DELAYED RELEASE ORAL at 06:49

## 2019-03-20 RX ADMIN — Medication 100 MILLIGRAM(S): at 22:27

## 2019-03-20 RX ADMIN — Medication 1 SPRAY(S): at 18:13

## 2019-03-20 RX ADMIN — LACOSAMIDE 100 MILLIGRAM(S): 50 TABLET ORAL at 09:26

## 2019-03-20 RX ADMIN — Medication 1 DROP(S): at 06:48

## 2019-03-20 RX ADMIN — OLANZAPINE 7.5 MILLIGRAM(S): 15 TABLET, FILM COATED ORAL at 22:27

## 2019-03-20 RX ADMIN — ENOXAPARIN SODIUM 40 MILLIGRAM(S): 100 INJECTION SUBCUTANEOUS at 06:49

## 2019-03-20 RX ADMIN — LEVETIRACETAM 1500 MILLIGRAM(S): 250 TABLET, FILM COATED ORAL at 06:51

## 2019-03-20 RX ADMIN — Medication 325 MILLIGRAM(S): at 11:49

## 2019-03-20 RX ADMIN — Medication 0.5 MILLIGRAM(S): at 11:25

## 2019-03-20 RX ADMIN — Medication 100 MILLIGRAM(S): at 13:08

## 2019-03-20 RX ADMIN — LEVETIRACETAM 1500 MILLIGRAM(S): 250 TABLET, FILM COATED ORAL at 21:11

## 2019-03-20 RX ADMIN — LACOSAMIDE 100 MILLIGRAM(S): 50 TABLET ORAL at 18:51

## 2019-03-20 RX ADMIN — Medication 1 MILLIGRAM(S): at 11:49

## 2019-03-20 RX ADMIN — Medication 1 TABLET(S): at 11:49

## 2019-03-20 RX ADMIN — Medication 1 SPRAY(S): at 06:48

## 2019-03-20 NOTE — PROGRESS NOTE ADULT - SUBJECTIVE AND OBJECTIVE BOX
Patient is a 53y old  Female who presents with a chief complaint of seizures (19 Mar 2019 22:21)      SUBJECTIVE / OVERNIGHT EVENTS:  Pt seen earlier, arsh Dumont at bedside, says pt at baseline.  May have had momentary seizure this am.  Pt makes eye contact, reaches with R arm for examiners buttons on white coat.    MEDICATIONS  (STANDING):  artificial  tears Solution 1 Drop(s) Both EYES two times a day  aspirin  chewable 81 milliGRAM(s) Oral daily  buDESOnide   0.5 milliGRAM(s) Respule 0.5 milliGRAM(s) Inhalation daily  docusate sodium 100 milliGRAM(s) Oral three times a day  enoxaparin Injectable 40 milliGRAM(s) SubCutaneous every 24 hours  ferrous    sulfate 325 milliGRAM(s) Oral daily  fluticasone propionate 50 MICROgram(s)/spray Nasal Spray 1 Spray(s) Both Nostrils two times a day  folic acid 1 milliGRAM(s) Oral daily  lacosamide Solution 100 milliGRAM(s) Oral two times a day  levETIRAcetam  Solution 1500 milliGRAM(s) Oral two times a day  multivitamin 1 Tablet(s) Oral daily  OLANZapine 7.5 milliGRAM(s) Oral at bedtime  pantoprazole    Tablet 40 milliGRAM(s) Oral before breakfast    MEDICATIONS  (PRN):      CAPILLARY BLOOD GLUCOSE          I&O's Summary      Vital Signs Last 24 Hrs  T(C): 36.7 (20 Mar 2019 15:45), Max: 36.7 (20 Mar 2019 15:45)  T(F): 98 (20 Mar 2019 15:45), Max: 98 (20 Mar 2019 15:45)  HR: 67 (20 Mar 2019 15:45) (63 - 73)  BP: 112/72 (20 Mar 2019 15:45) (100/80 - 176/64)  BP(mean): --  RR: 18 (20 Mar 2019 15:45) (16 - 18)  SpO2: 96% (20 Mar 2019 15:45) (95% - 100%)    PHYSICAL EXAM:  GENERAL: middle aged female, MR, nonverbal  HEAD:  Atraumatic, Normocephalic  EYES: EOMI, PERRLA, conjunctiva and sclera clear  NECK: Supple, No JVD  CHEST/LUNG: Clear to auscultation bilaterally; No wheeze  HEART: Regular rate and rhythm; No murmurs, rubs, or gallops  ABDOMEN: Soft, Nontender, Nondistended; Bowel sounds present  EXTREMITIES:  2+ Peripheral Pulses, No clubbing, cyanosis, or edema  PSYCH: calm  NEUROLOGY: non-focal  SKIN: No rashes or lesions    LABS:                        13.9   8.23  )-----------( 275      ( 19 Mar 2019 14:48 )             45.4         144  |  101  |  11  ----------------------------<  97  3.8   |  33<H>  |  0.52    Ca    9.4      19 Mar 2019 15:08  Phos  2.7       Mg     2.0         TPro  8.0  /  Alb  3.9  /  TBili  0.3  /  DBili  x   /  AST  55<H>  /  ALT  58<H>  /  AlkPhos  98            Urinalysis Basic - ( 19 Mar 2019 18:30 )    Color: YELLOW / Appearance: CLEAR / S.020 / pH: 7.0  Gluc: NEGATIVE / Ketone: TRACE  / Bili: SMALL / Urobili: NORMAL   Blood: TRACE / Protein: 300 / Nitrite: NEGATIVE   Leuk Esterase: NEGATIVE / RBC: 3-5 / WBC 6-10   Sq Epi: x / Non Sq Epi: MODERATE / Bacteria: MODERATE    < from: Xray Chest 1 View- PORTABLE-Urgent (19 @ 15:35) >  EXAM:  Portable frontal chest from 3/19/2019 at 1535. Compared to prior study   from 3/5/2019.    Rotated left.    IMPRESSION:   Low lung volumes.    Redemonstrated uniform hazy opacification of peripheral lower left   hemithorax probably corresponds to epicardial fat rather than pleural   effusion or patchy airspace consolidation. Sharp right CP angle. Clear   remaining visualized lungs. No pneumothorax.    Heart size and mediastinal width inaccurately assessed on this projection.    Osteopenic appearing osseous structures for age, however, bone   mineralization more accurately assessed with densitometry. Mild spinal   degenerative change.     RADIOLOGY & ADDITIONAL TESTS:    Imaging Personally Reviewed:    Consultant(s) Notes Reviewed:      Care Discussed with Consultants/Other Providers: RN, SW, CM, ADS re overall care; neuro re seizures

## 2019-03-20 NOTE — PROGRESS NOTE ADULT - ATTENDING COMMENTS
Agree with resident H&P and plan as edited above. Appreciate neuro recs.  HHVALERI at bedside reports to me seizures [id number], has jerky movements of b/l UE at baseline, per aid patient was at her baseline on my exam, not following commands, non-verbal, reported decreased PO intake x2days, however she has been able to get patient to drink ensure prior to my exam. In accepting this patient earlier from the ED, ED reported to me that patient aid stated they would bring patient back if she were discharged from the ED, leading to her current admission. F/u further neuro recs. Encourage PO intake. F/u levetiracetam level. Prior admission EMR reviewed.     #macrocytosis without anemia  -recent TSH wnl  -if repeat labs required check B12/folate, otherwise repeat CBC as outpatient for further w/u

## 2019-03-20 NOTE — PROGRESS NOTE ADULT - ASSESSMENT
53F pmh CP nonverbal at baseline) and seizure disorder admitted for increased seizures.   HHA reported seizures [id number], has jerky movements of b/l UE at baseline, per aid baseline not following commands, non-verbal, reported decreased PO intake x2days    Problem: Seizures -  no clear infection  Assessment and Plan: pt now with increased seizures. seen by neuro who suggested increasing medications    -increase Keppra 1500mgBID (switched to solution in setting of dysphagia)    -c/w Vimpat 100 BID (switched to solution in setting of dysphadia)    -c/w home Olanzapine    -appreciate neuro recs - check vEEG    Problem: Adult failure to thrive  Assessment and Plan: secondary to dysphagia. Aids reporting that patient has not been eating at home and has been more lethargic than baseline.     -will restart dysphagia diet    -aspiration precautions    -prior discussions with mother (per EMR) appear that she is against PEG at this time, if it becomes and issue, would readdress    -nutrition c/s    Problem: Functional quadriplegia  Assessment and Plan: -turn regularly to prevent ulcers    -assistance with meals    Problem: ASB (asymptomatic bacteriuria)  Assessment and Plan: patient w/ bacteria and WBC in urine, asymptomatic -> no tenderness in suprapubic area, positive epithelia cells (likely contaminated)   -will monitor for now    Problem: Transaminitis  Assessment and Plan: appear to be chronic, likely in setting of medications    -recheck as outpt    Problem: Cerebral palsy  Assessment and Plan: non verbal, non-ambulatory at baseline    -supportive measures

## 2019-03-21 LAB
ALBUMIN SERPL ELPH-MCNC: 3.6 G/DL — SIGNIFICANT CHANGE UP (ref 3.3–5)
ALP SERPL-CCNC: 92 U/L — SIGNIFICANT CHANGE UP (ref 40–120)
ALT FLD-CCNC: 71 U/L — HIGH (ref 4–33)
ANION GAP SERPL CALC-SCNC: 13 MMO/L — SIGNIFICANT CHANGE UP (ref 7–14)
AST SERPL-CCNC: 68 U/L — HIGH (ref 4–32)
BASOPHILS # BLD AUTO: 0.04 K/UL — SIGNIFICANT CHANGE UP (ref 0–0.2)
BASOPHILS NFR BLD AUTO: 0.5 % — SIGNIFICANT CHANGE UP (ref 0–2)
BILIRUB SERPL-MCNC: 0.3 MG/DL — SIGNIFICANT CHANGE UP (ref 0.2–1.2)
BUN SERPL-MCNC: 8 MG/DL — SIGNIFICANT CHANGE UP (ref 7–23)
CALCIUM SERPL-MCNC: 8.9 MG/DL — SIGNIFICANT CHANGE UP (ref 8.4–10.5)
CHLORIDE SERPL-SCNC: 104 MMOL/L — SIGNIFICANT CHANGE UP (ref 98–107)
CO2 SERPL-SCNC: 25 MMOL/L — SIGNIFICANT CHANGE UP (ref 22–31)
CREAT SERPL-MCNC: 0.51 MG/DL — SIGNIFICANT CHANGE UP (ref 0.5–1.3)
EOSINOPHIL # BLD AUTO: 0.15 K/UL — SIGNIFICANT CHANGE UP (ref 0–0.5)
EOSINOPHIL NFR BLD AUTO: 2 % — SIGNIFICANT CHANGE UP (ref 0–6)
GLUCOSE BLDC GLUCOMTR-MCNC: 129 MG/DL — HIGH (ref 70–99)
GLUCOSE SERPL-MCNC: 133 MG/DL — HIGH (ref 70–99)
HCT VFR BLD CALC: 47.2 % — HIGH (ref 34.5–45)
HGB BLD-MCNC: 14.3 G/DL — SIGNIFICANT CHANGE UP (ref 11.5–15.5)
IMM GRANULOCYTES NFR BLD AUTO: 1 % — SIGNIFICANT CHANGE UP (ref 0–1.5)
LEVETIRACETAM SERPL-MCNC: 53.1 MCG/ML — HIGH (ref 12–46)
LYMPHOCYTES # BLD AUTO: 1.71 K/UL — SIGNIFICANT CHANGE UP (ref 1–3.3)
LYMPHOCYTES # BLD AUTO: 23.3 % — SIGNIFICANT CHANGE UP (ref 13–44)
MAGNESIUM SERPL-MCNC: 1.9 MG/DL — SIGNIFICANT CHANGE UP (ref 1.6–2.6)
MCHC RBC-ENTMCNC: 30.3 % — LOW (ref 32–36)
MCHC RBC-ENTMCNC: 30.9 PG — SIGNIFICANT CHANGE UP (ref 27–34)
MCV RBC AUTO: 101.9 FL — HIGH (ref 80–100)
MONOCYTES # BLD AUTO: 0.51 K/UL — SIGNIFICANT CHANGE UP (ref 0–0.9)
MONOCYTES NFR BLD AUTO: 6.9 % — SIGNIFICANT CHANGE UP (ref 2–14)
NEUTROPHILS # BLD AUTO: 4.87 K/UL — SIGNIFICANT CHANGE UP (ref 1.8–7.4)
NEUTROPHILS NFR BLD AUTO: 66.3 % — SIGNIFICANT CHANGE UP (ref 43–77)
NRBC # FLD: 0 K/UL — LOW (ref 25–125)
PHOSPHATE SERPL-MCNC: 3.1 MG/DL — SIGNIFICANT CHANGE UP (ref 2.5–4.5)
PLATELET # BLD AUTO: 238 K/UL — SIGNIFICANT CHANGE UP (ref 150–400)
PMV BLD: 11.4 FL — SIGNIFICANT CHANGE UP (ref 7–13)
POTASSIUM SERPL-MCNC: 4.4 MMOL/L — SIGNIFICANT CHANGE UP (ref 3.5–5.3)
POTASSIUM SERPL-SCNC: 4.4 MMOL/L — SIGNIFICANT CHANGE UP (ref 3.5–5.3)
PROT SERPL-MCNC: 7.7 G/DL — SIGNIFICANT CHANGE UP (ref 6–8.3)
RBC # BLD: 4.63 M/UL — SIGNIFICANT CHANGE UP (ref 3.8–5.2)
RBC # FLD: 13.6 % — SIGNIFICANT CHANGE UP (ref 10.3–14.5)
SODIUM SERPL-SCNC: 142 MMOL/L — SIGNIFICANT CHANGE UP (ref 135–145)
WBC # BLD: 7.35 K/UL — SIGNIFICANT CHANGE UP (ref 3.8–10.5)
WBC # FLD AUTO: 7.35 K/UL — SIGNIFICANT CHANGE UP (ref 3.8–10.5)

## 2019-03-21 PROCEDURE — 99232 SBSQ HOSP IP/OBS MODERATE 35: CPT | Mod: GC

## 2019-03-21 PROCEDURE — 95816 EEG AWAKE AND DROWSY: CPT | Mod: 26

## 2019-03-21 PROCEDURE — 99233 SBSQ HOSP IP/OBS HIGH 50: CPT

## 2019-03-21 RX ORDER — SODIUM CHLORIDE 9 MG/ML
1000 INJECTION, SOLUTION INTRAVENOUS
Refills: 0 | Status: DISCONTINUED | OUTPATIENT
Start: 2019-03-21 | End: 2019-03-25

## 2019-03-21 RX ORDER — LACOSAMIDE 50 MG/1
100 TABLET ORAL ONCE
Refills: 0 | Status: DISCONTINUED | OUTPATIENT
Start: 2019-03-21 | End: 2019-03-21

## 2019-03-21 RX ORDER — LACOSAMIDE 50 MG/1
50 TABLET ORAL EVERY 12 HOURS
Refills: 0 | Status: DISCONTINUED | OUTPATIENT
Start: 2019-03-21 | End: 2019-03-21

## 2019-03-21 RX ORDER — LACOSAMIDE 50 MG/1
50 TABLET ORAL ONCE
Refills: 0 | Status: DISCONTINUED | OUTPATIENT
Start: 2019-03-22 | End: 2019-03-22

## 2019-03-21 RX ORDER — LACOSAMIDE 50 MG/1
100 TABLET ORAL
Refills: 0 | Status: DISCONTINUED | OUTPATIENT
Start: 2019-03-21 | End: 2019-03-21

## 2019-03-21 RX ORDER — LACOSAMIDE 50 MG/1
100 TABLET ORAL EVERY 12 HOURS
Refills: 0 | Status: DISCONTINUED | OUTPATIENT
Start: 2019-03-21 | End: 2019-03-21

## 2019-03-21 RX ORDER — VALPROIC ACID (AS SODIUM SALT) 250 MG/5ML
1080 SOLUTION, ORAL ORAL ONCE
Refills: 0 | Status: COMPLETED | OUTPATIENT
Start: 2019-03-21 | End: 2019-03-21

## 2019-03-21 RX ORDER — VALPROIC ACID (AS SODIUM SALT) 250 MG/5ML
500 SOLUTION, ORAL ORAL
Refills: 0 | Status: DISCONTINUED | OUTPATIENT
Start: 2019-03-21 | End: 2019-03-23

## 2019-03-21 RX ORDER — LEVETIRACETAM 250 MG/1
500 TABLET, FILM COATED ORAL ONCE
Refills: 0 | Status: COMPLETED | OUTPATIENT
Start: 2019-03-21 | End: 2019-03-21

## 2019-03-21 RX ORDER — LACOSAMIDE 50 MG/1
100 TABLET ORAL ONCE
Refills: 0 | Status: DISCONTINUED | OUTPATIENT
Start: 2019-03-22 | End: 2019-03-22

## 2019-03-21 RX ORDER — SODIUM CHLORIDE 9 MG/ML
500 INJECTION, SOLUTION INTRAVENOUS ONCE
Refills: 0 | Status: COMPLETED | OUTPATIENT
Start: 2019-03-21 | End: 2019-03-21

## 2019-03-21 RX ORDER — LACOSAMIDE 50 MG/1
50 TABLET ORAL ONCE
Refills: 0 | Status: DISCONTINUED | OUTPATIENT
Start: 2019-03-21 | End: 2019-03-21

## 2019-03-21 RX ORDER — LEVETIRACETAM 250 MG/1
1500 TABLET, FILM COATED ORAL EVERY 12 HOURS
Refills: 0 | Status: DISCONTINUED | OUTPATIENT
Start: 2019-03-21 | End: 2019-03-23

## 2019-03-21 RX ORDER — VALPROIC ACID (AS SODIUM SALT) 250 MG/5ML
1080 SOLUTION, ORAL ORAL ONCE
Refills: 0 | Status: DISCONTINUED | OUTPATIENT
Start: 2019-03-21 | End: 2019-03-21

## 2019-03-21 RX ADMIN — LEVETIRACETAM 1500 MILLIGRAM(S): 250 TABLET, FILM COATED ORAL at 06:14

## 2019-03-21 RX ADMIN — Medication 1 MILLIGRAM(S): at 09:57

## 2019-03-21 RX ADMIN — Medication 1 SPRAY(S): at 17:42

## 2019-03-21 RX ADMIN — Medication 1 DROP(S): at 17:42

## 2019-03-21 RX ADMIN — ENOXAPARIN SODIUM 40 MILLIGRAM(S): 100 INJECTION SUBCUTANEOUS at 06:12

## 2019-03-21 RX ADMIN — LACOSAMIDE 120 MILLIGRAM(S): 50 TABLET ORAL at 22:04

## 2019-03-21 RX ADMIN — Medication 27.5 MILLIGRAM(S): at 17:42

## 2019-03-21 RX ADMIN — SODIUM CHLORIDE 75 MILLILITER(S): 9 INJECTION, SOLUTION INTRAVENOUS at 17:50

## 2019-03-21 RX ADMIN — OLANZAPINE 7.5 MILLIGRAM(S): 15 TABLET, FILM COATED ORAL at 22:05

## 2019-03-21 RX ADMIN — Medication 1 SPRAY(S): at 06:12

## 2019-03-21 RX ADMIN — Medication 1 MILLIGRAM(S): at 10:18

## 2019-03-21 RX ADMIN — SODIUM CHLORIDE 500 MILLILITER(S): 9 INJECTION, SOLUTION INTRAVENOUS at 17:50

## 2019-03-21 RX ADMIN — Medication 30.4 MILLIGRAM(S): at 12:01

## 2019-03-21 RX ADMIN — SODIUM CHLORIDE 75 MILLILITER(S): 9 INJECTION, SOLUTION INTRAVENOUS at 22:05

## 2019-03-21 RX ADMIN — Medication 1 DROP(S): at 06:12

## 2019-03-21 RX ADMIN — Medication 0.5 MILLIGRAM(S): at 09:18

## 2019-03-21 RX ADMIN — Medication 100 MILLIGRAM(S): at 22:05

## 2019-03-21 RX ADMIN — Medication 100 MILLIGRAM(S): at 06:12

## 2019-03-21 RX ADMIN — LEVETIRACETAM 400 MILLIGRAM(S): 250 TABLET, FILM COATED ORAL at 18:59

## 2019-03-21 RX ADMIN — PANTOPRAZOLE SODIUM 40 MILLIGRAM(S): 20 TABLET, DELAYED RELEASE ORAL at 06:12

## 2019-03-21 RX ADMIN — LEVETIRACETAM 400 MILLIGRAM(S): 250 TABLET, FILM COATED ORAL at 12:02

## 2019-03-21 NOTE — PROGRESS NOTE ADULT - ATTENDING COMMENTS
Patient seen examined and above note reviewed and I agree with assessment and plan as outlined. Will proceed with depakote load today followed by 500mg BID for both seizure and behavioral management.  VImpat will be tapered off as not effective for certain seizure types and continue seizure precautions. Depakote level in morning.   Continue medical management and supportive care and overnight EEG monitoring.

## 2019-03-21 NOTE — PROGRESS NOTE ADULT - ASSESSMENT
53F pmh CP nonverbal at baseline) and seizure disorder admitted for increased seizures.   (HHA reported seizures [id number], has jerky movements of b/l UE at baseline, per aid baseline not following commands, non-verbal, reported decreased PO intake x2days)    Problem: Seizures -  no clear infection  Assessment and Plan:  increased seizures - d/w neuro re prolonged seizures this am, given extra dose Keppra 500mg x1, c/w Keppra 1500mgBID and Vimpat 100 BID (both changed to IV)  - loaded with depakote 15mg/kg --> 500 bid, f/u level in am, video EEG in progress  - need to explore with family re possible PEG for reliable AEDs   -c/w home Olanzapine     Problem: Adult failure to thrive  Assessment and Plan: secondary to dysphagia. Aids reporting that patient has not been eating at home and has been more lethargic than baseline.    - dysphagia diet, aspiration precautions    -prior discussions with mother (per EMR) appear that she is against PEG but need to readdress   -nutrition c/s    Problem: Functional quadriplegia  Assessment and Plan: -turn regularly to prevent ulcers    -assistance with meals    Problem: ASB (asymptomatic bacteriuria)  Assessment and Plan: patient w/ bacteria and WBC in urine, asymptomatic -> no tenderness in suprapubic area, positive epithelia cells (likely contaminated)   -will monitor for now    Problem: Transaminitis  Assessment and Plan: appear to be chronic, likely in setting of medications    -recheck as outpt    Problem: Cerebral palsy  Assessment and Plan: non verbal, non-ambulatory at baseline    -supportive measures

## 2019-03-21 NOTE — DIETITIAN INITIAL EVALUATION ADULT. - PERTINENT LABORATORY DATA
03-21 Na 142 mmol/L Glu 133 mg/dL<H> K+ 4.4 mmol/L Cr 0.51 mg/dL BUN 8 mg/dL Phos 3.1 mg/dL Alb 3.6 g/dL  Hgb 14.3 g/dL Hct 47.2 %<H>

## 2019-03-21 NOTE — PROGRESS NOTE ADULT - SUBJECTIVE AND OBJECTIVE BOX
Patient is a 53y old  Female who presents with a chief complaint of seizures (20 Mar 2019 18:42)      SUBJECTIVE / OVERNIGHT EVENTS:  Pt seen earlier, during morning rounds noted to be having seizures, episodes of tonic clonic movements BUE with facial grimacing, given Ativan 2mg x2.  RRT called loaded with depakote, extra dose Keppra.  Postictal lethargic.    MEDICATIONS  (STANDING):  artificial  tears Solution 1 Drop(s) Both EYES two times a day  aspirin  chewable 81 milliGRAM(s) Oral daily  buDESOnide   0.5 milliGRAM(s) Respule 0.5 milliGRAM(s) Inhalation daily  docusate sodium 100 milliGRAM(s) Oral three times a day  enoxaparin Injectable 40 milliGRAM(s) SubCutaneous every 24 hours  ferrous    sulfate 325 milliGRAM(s) Oral daily  fluticasone propionate 50 MICROgram(s)/spray Nasal Spray 1 Spray(s) Both Nostrils two times a day  folic acid 1 milliGRAM(s) Oral daily  lacosamide IVPB 100 milliGRAM(s) IV Intermittent every 12 hours  levETIRAcetam  IVPB 1500 milliGRAM(s) IV Intermittent every 12 hours  multivitamin 1 Tablet(s) Oral daily  OLANZapine 7.5 milliGRAM(s) Oral at bedtime  pantoprazole    Tablet 40 milliGRAM(s) Oral before breakfast  valproate sodium IVPB 500 milliGRAM(s) IV Intermittent two times a day    MEDICATIONS  (PRN):  LORazepam   Injectable 1 milliGRAM(s) IV Push every 6 hours PRN seizure      CAPILLARY BLOOD GLUCOSE      POCT Blood Glucose.: 129 mg/dL (21 Mar 2019 10:43)      I&O's Summary      Vital Signs Last 24 Hrs  T(C): 36.3 (21 Mar 2019 14:05), Max: 36.9 (21 Mar 2019 06:05)  T(F): 97.3 (21 Mar 2019 14:05), Max: 98.4 (21 Mar 2019 06:05)  HR: 89 (21 Mar 2019 14:05) (65 - 119)  BP: 118/73 (21 Mar 2019 14:05) (110/70 - 132/84)  BP(mean): --  RR: 17 (21 Mar 2019 14:05) (17 - 18)  SpO2: 100% (21 Mar 2019 14:05) (96% - 100%)    PHYSICAL EXAM:  GENERAL: middle aged female, MR, nonverbal  HEAD:  Atraumatic, Normocephalic  EYES: EOMI, PERRLA, conjunctiva and sclera clear  NECK: Supple, No JVD  CHEST/LUNG: Clear to auscultation bilaterally; No wheeze  HEART: Regular rate and rhythm; No murmurs, rubs, or gallops  ABDOMEN: Soft, Nontender, Nondistended; Bowel sounds present  EXTREMITIES:  2+ Peripheral Pulses, No clubbing, cyanosis, or edema  PSYCH: calm  NEUROLOGY: active seizure with tonic clonic movements BLE, facial grimacing --> lethargic postictal  SKIN: No rashes or lesions    LABS:                        14.3   7.35  )-----------( 238      ( 21 Mar 2019 07:05 )             47.2         142  |  104  |  8   ----------------------------<  133<H>  4.4   |  25  |  0.51    Ca    8.9      21 Mar 2019 07:05  Phos  3.1     -  Mg     1.9         TPro  7.7  /  Alb  3.6  /  TBili  0.3  /  DBili  x   /  AST  68<H>  /  ALT  71<H>  /  AlkPhos  92            Urinalysis Basic - ( 19 Mar 2019 18:30 )    Color: YELLOW / Appearance: CLEAR / S.020 / pH: 7.0  Gluc: NEGATIVE / Ketone: TRACE  / Bili: SMALL / Urobili: NORMAL   Blood: TRACE / Protein: 300 / Nitrite: NEGATIVE   Leuk Esterase: NEGATIVE / RBC: 3-5 / WBC 6-10   Sq Epi: x / Non Sq Epi: MODERATE / Bacteria: MODERATE        RADIOLOGY & ADDITIONAL TESTS:    Imaging Personally Reviewed:    Consultant(s) Notes Reviewed:      Care Discussed with Consultants/Other Providers: RN, SW, CM, ADS re overall care; neuro re seizures

## 2019-03-21 NOTE — DIETITIAN INITIAL EVALUATION ADULT. - OTHER INFO
Nutrition consult for assessment. 52 y/o F with CP, nonverbal and seizure disorder. Pt with failure to thrive with poor PO 2/2 dysphagia. Per chart, family does not want PEG at this time. Per last speech and swallow assessment 3/7, recommendation to initiate non-oral means or nutrition/hydration secondary to refusals. Per RN, no diarrhea, constipation. Weight trend: (10/2018) 183 pounds --> (2/27) 164 pounds --> (3/8) 151.8 pounds --> (3/21) 157.8 pounds.  13.8% wt loss in 5 months.

## 2019-03-21 NOTE — DIETITIAN INITIAL EVALUATION ADULT. - ENERGY NEEDS
Ht: 59 in- heights vary in chart  Wt:  157.8 pounds BMI: 31.9   IBW: 98 pounds   Skin: 1+ R foot edema, no pressure injuries noted.

## 2019-03-21 NOTE — PROGRESS NOTE ADULT - ASSESSMENT
53F pmh CP (nonverbal at baseline) and seizure disorder admitted for increased seizures. Rapid called for possible seizures. Upon arrival, patient mouth bilateral twitching, pupils reactive to light, no jerky movements noted. Prior to rapid, patient received 2mg ativan x 1 at 9:15AM and being loaded with Depakote. Patient received another 2mg IV ativan during rapid. Patient already on keppra and vimpat. EEG showed no epileptiform discharges.     Impression     Breakthrough seizures as patient is not complaint with medications     Plan     Cont Keppra 1500 BID  Cont Depakote 500 BID   send depakote level in am   EEG monitoring overnight   Decrease vimpat 100/50 IV today and tomorrow 50 BID and then 50 OD for one day before discontinuing  sepsis workup 53F pmh CP (nonverbal at baseline) and seizure disorder admitted for increased seizures. Rapid called for possible seizures. Upon arrival, patient mouth bilateral twitching, pupils reactive to light, no jerky movements noted. Prior to rapid, patient received 2mg ativan x 1 at 9:15AM and being loaded with Depakote. Patient received another 2mg IV ativan during rapid. Patient already on keppra and vimpat. EEG showed no epileptiform discharges.     Impression     Breakthrough seizures as patient is not complaint with medications     Plan     Cont Keppra 1500 mg BID  Cont Depakote 500 BID   send depakote level in am   EEG monitoring overnight   Decrease vimpat 100/50 IV today and tomorrow 50 BID and then 50 OD for one day before discontinuing  sepsis workup

## 2019-03-21 NOTE — DIETITIAN INITIAL EVALUATION ADULT. - PERTINENT MEDS FT
MEDICATIONS  (STANDING):  artificial  tears Solution 1 Drop(s) Both EYES two times a day  aspirin  chewable 81 milliGRAM(s) Oral daily  buDESOnide   0.5 milliGRAM(s) Respule 0.5 milliGRAM(s) Inhalation daily  docusate sodium 100 milliGRAM(s) Oral three times a day  enoxaparin Injectable 40 milliGRAM(s) SubCutaneous every 24 hours  ferrous    sulfate 325 milliGRAM(s) Oral daily  fluticasone propionate 50 MICROgram(s)/spray Nasal Spray 1 Spray(s) Both Nostrils two times a day  folic acid 1 milliGRAM(s) Oral daily  lacosamide IVPB 100 milliGRAM(s) IV Intermittent every 12 hours  levETIRAcetam  IVPB 1500 milliGRAM(s) IV Intermittent every 12 hours  multivitamin 1 Tablet(s) Oral daily  OLANZapine 7.5 milliGRAM(s) Oral at bedtime  pantoprazole    Tablet 40 milliGRAM(s) Oral before breakfast  valproate sodium IVPB 500 milliGRAM(s) IV Intermittent two times a day

## 2019-03-21 NOTE — PROGRESS NOTE ADULT - SUBJECTIVE AND OBJECTIVE BOX
Subjective:Interval History - Patient was examined during rapid response. Mild facial twitching bilaterally noted, not concerning for seizure.     Objective:   Vital Signs Last 24 Hrs  T(C): 36.3 (21 Mar 2019 14:05), Max: 36.9 (21 Mar 2019 06:05)  T(F): 97.3 (21 Mar 2019 14:05), Max: 98.4 (21 Mar 2019 06:05)  HR: 89 (21 Mar 2019 14:05) (65 - 119)  BP: 118/73 (21 Mar 2019 14:05) (110/70 - 132/84)  BP(mean): --  RR: 17 (21 Mar 2019 14:05) (17 - 18)  SpO2: 100% (21 Mar 2019 14:05) (98% - 100%)    eyes closed, not responded to voice stimuli and touch         Other:    03-21    142  |  104  |  8   ----------------------------<  133<H>  4.4   |  25  |  0.51    Ca    8.9      21 Mar 2019 07:05  Phos  3.1     03-21  Mg     1.9     03-21    TPro  7.7  /  Alb  3.6  /  TBili  0.3  /  DBili  x   /  AST  68<H>  /  ALT  71<H>  /  AlkPhos  92  03-21    LIVER FUNCTIONS - ( 21 Mar 2019 07:05 )  Alb: 3.6 g/dL / Pro: 7.7 g/dL / ALK PHOS: 92 u/L / ALT: 71 u/L / AST: 68 u/L / GGT: x                                 14.3   7.35  )-----------( 238      ( 21 Mar 2019 07:05 )             47.2       Radiology    EEG:    MEDICATIONS  (STANDING):  artificial  tears Solution 1 Drop(s) Both EYES two times a day  aspirin  chewable 81 milliGRAM(s) Oral daily  buDESOnide   0.5 milliGRAM(s) Respule 0.5 milliGRAM(s) Inhalation daily  docusate sodium 100 milliGRAM(s) Oral three times a day  enoxaparin Injectable 40 milliGRAM(s) SubCutaneous every 24 hours  ferrous    sulfate 325 milliGRAM(s) Oral daily  fluticasone propionate 50 MICROgram(s)/spray Nasal Spray 1 Spray(s) Both Nostrils two times a day  folic acid 1 milliGRAM(s) Oral daily  lacosamide IVPB 100 milliGRAM(s) IV Intermittent every 12 hours  levETIRAcetam  IVPB 1500 milliGRAM(s) IV Intermittent every 12 hours  multivitamin 1 Tablet(s) Oral daily  OLANZapine 7.5 milliGRAM(s) Oral at bedtime  pantoprazole    Tablet 40 milliGRAM(s) Oral before breakfast  valproate sodium IVPB 500 milliGRAM(s) IV Intermittent two times a day    MEDICATIONS  (PRN):  LORazepam   Injectable 1 milliGRAM(s) IV Push every 6 hours PRN seizure

## 2019-03-21 NOTE — EEG REPORT - NS EEG TEXT BOX
Study Date: 3/21/2019  --------------------------------------------------------------------------------------------------  History:  Concern for Seizures  --------------------------------------------------------------------------------------------------  Study Interpretation:    FINDINGS:  The background was continuous, variable and reactive. No discernable PDR noted. Background consisted of mainly continuous polymorphic delta/theta frequencies most prominent over the right frontocentral regions.     Epileptiform Activity:   No epileptiform discharges were present.    Sleep  Drowsiness was characterized by fragmentation, attenuation, and slowing of the background activity.    No sleep II transients recorded    Events:  No clinical events were recorded.  No seizures were recorded.    Activation Procedures:   -Hyperventilation was not performed.    -Photic stimulation was not performed.    Artifacts:  Intermittent myogenic and movement artifacts were noted.    ECG:  The heart rate on single channel ECG was predominantly between 100-120 BPM.  -----------------------------------------------------------------------------------------------------    EEG Classification / Summary:  Abnormal EEG   -Continuous slowing, generalized (delta and theta) most prominent in the right frontocentral region     Clinical Impression:  Moderate nonspecific diffuse or multifocal cerebral dysfunction most prominent over the right frontal-central region. There were no epileptiform abnormalities recorded.        Pilar Ward MD  Clinical Neurophysiology Fellow Study Date: 3/21/2019  --------------------------------------------------------------------------------------------------  History:  Concern for Seizures  --------------------------------------------------------------------------------------------------  Study Interpretation:    FINDINGS:  The background was continuous, variable and reactive. No discernable PDR noted. Background consisted of mainly continuous polymorphic delta/theta frequencies most prominent over the right frontocentral regions.     Epileptiform Activity:   No epileptiform discharges were present.    Sleep  Drowsiness was characterized by fragmentation, attenuation, and slowing of the background activity.    No sleep II transients recorded    Events:  No clinical events were recorded.  No seizures were recorded.    Activation Procedures:   -Hyperventilation was not performed.    -Photic stimulation was not performed.    Artifacts:  Intermittent myogenic and movement artifacts were noted.    ECG:  The heart rate on single channel ECG was predominantly between 100-120 BPM.  -----------------------------------------------------------------------------------------------------    EEG Classification / Summary:  Abnormal EEG   -Continuous slowing, generalized (delta and theta) most prominent in the right frontocentral region     Clinical Impression:  Moderate to severe, nonspecific diffuse or multifocal cerebral dysfunction most prominent over the right frontal-central region. There were no epileptiform abnormalities recorded.        Pilar Ward MD  Clinical Neurophysiology Fellow Study Date: 3/21/2019  --------------------------------------------------------------------------------------------------  History:  Concern for Seizures  --------------------------------------------------------------------------------------------------  Study Interpretation:    FINDINGS:  The background was continuous, variable and reactive. No discernable PDR noted. Background consisted of mainly continuous polymorphic delta/theta frequencies most prominent over the right frontocentral regions.     Epileptiform Activity:   No epileptiform discharges were present.    Sleep  Drowsiness was characterized by fragmentation, attenuation, and slowing of the background activity.    No sleep II transients recorded    Events:  No clinical events were recorded.  No seizures were recorded.    Activation Procedures:   -Hyperventilation was not performed.    -Photic stimulation was not performed.    Artifacts:  Intermittent myogenic and movement artifacts were noted.    ECG:  The heart rate on single channel ECG was predominantly between 100-120 BPM.  -----------------------------------------------------------------------------------------------------    EEG Classification / Summary:  Abnormal EEG   -Continuous slowing, generalized (delta and theta) most prominent in the right frontocentral region     Clinical Impression:  Moderate to severe, nonspecific diffuse or multifocal cerebral dysfunction most prominent over the right frontal-central region. There were no epileptiform abnormalities recorded.        Pilar Ward MD  Clinical Neurophysiology Fellow    Ciro Quiñonez MD

## 2019-03-22 LAB
ALBUMIN SERPL ELPH-MCNC: 3 G/DL — LOW (ref 3.3–5)
ALP SERPL-CCNC: 78 U/L — SIGNIFICANT CHANGE UP (ref 40–120)
ALT FLD-CCNC: 58 U/L — HIGH (ref 4–33)
ANION GAP SERPL CALC-SCNC: 10 MMO/L — SIGNIFICANT CHANGE UP (ref 7–14)
AST SERPL-CCNC: 51 U/L — HIGH (ref 4–32)
BILIRUB SERPL-MCNC: 0.3 MG/DL — SIGNIFICANT CHANGE UP (ref 0.2–1.2)
BUN SERPL-MCNC: 7 MG/DL — SIGNIFICANT CHANGE UP (ref 7–23)
CALCIUM SERPL-MCNC: 8.6 MG/DL — SIGNIFICANT CHANGE UP (ref 8.4–10.5)
CHLORIDE SERPL-SCNC: 107 MMOL/L — SIGNIFICANT CHANGE UP (ref 98–107)
CO2 SERPL-SCNC: 28 MMOL/L — SIGNIFICANT CHANGE UP (ref 22–31)
CREAT SERPL-MCNC: 0.46 MG/DL — LOW (ref 0.5–1.3)
GLUCOSE SERPL-MCNC: 108 MG/DL — HIGH (ref 70–99)
HCT VFR BLD CALC: 40.1 % — SIGNIFICANT CHANGE UP (ref 34.5–45)
HGB BLD-MCNC: 12.4 G/DL — SIGNIFICANT CHANGE UP (ref 11.5–15.5)
MAGNESIUM SERPL-MCNC: 1.9 MG/DL — SIGNIFICANT CHANGE UP (ref 1.6–2.6)
MCHC RBC-ENTMCNC: 30.9 % — LOW (ref 32–36)
MCHC RBC-ENTMCNC: 30.9 PG — SIGNIFICANT CHANGE UP (ref 27–34)
MCV RBC AUTO: 100 FL — SIGNIFICANT CHANGE UP (ref 80–100)
NRBC # FLD: 0 K/UL — LOW (ref 25–125)
PHOSPHATE SERPL-MCNC: 2.5 MG/DL — SIGNIFICANT CHANGE UP (ref 2.5–4.5)
PLATELET # BLD AUTO: 186 K/UL — SIGNIFICANT CHANGE UP (ref 150–400)
PMV BLD: 11.6 FL — SIGNIFICANT CHANGE UP (ref 7–13)
POTASSIUM SERPL-MCNC: 3.7 MMOL/L — SIGNIFICANT CHANGE UP (ref 3.5–5.3)
POTASSIUM SERPL-SCNC: 3.7 MMOL/L — SIGNIFICANT CHANGE UP (ref 3.5–5.3)
PROT SERPL-MCNC: 6.1 G/DL — SIGNIFICANT CHANGE UP (ref 6–8.3)
RBC # BLD: 4.01 M/UL — SIGNIFICANT CHANGE UP (ref 3.8–5.2)
RBC # FLD: 13.5 % — SIGNIFICANT CHANGE UP (ref 10.3–14.5)
SODIUM SERPL-SCNC: 145 MMOL/L — SIGNIFICANT CHANGE UP (ref 135–145)
VALPROATE SERPL-MCNC: 38.7 UG/ML — LOW (ref 50–100)
WBC # BLD: 13.68 K/UL — HIGH (ref 3.8–10.5)
WBC # FLD AUTO: 13.68 K/UL — HIGH (ref 3.8–10.5)

## 2019-03-22 PROCEDURE — 99233 SBSQ HOSP IP/OBS HIGH 50: CPT | Mod: GC

## 2019-03-22 PROCEDURE — 95951: CPT | Mod: 26

## 2019-03-22 PROCEDURE — 99233 SBSQ HOSP IP/OBS HIGH 50: CPT

## 2019-03-22 RX ORDER — LACOSAMIDE 50 MG/1
50 TABLET ORAL EVERY 12 HOURS
Refills: 0 | Status: DISCONTINUED | OUTPATIENT
Start: 2019-03-23 | End: 2019-03-23

## 2019-03-22 RX ORDER — VALPROIC ACID (AS SODIUM SALT) 250 MG/5ML
750 SOLUTION, ORAL ORAL ONCE
Refills: 0 | Status: COMPLETED | OUTPATIENT
Start: 2019-03-22 | End: 2019-03-22

## 2019-03-22 RX ORDER — LACOSAMIDE 50 MG/1
50 TABLET ORAL ONCE
Refills: 0 | Status: DISCONTINUED | OUTPATIENT
Start: 2019-03-24 | End: 2019-03-25

## 2019-03-22 RX ADMIN — Medication 1 MILLIGRAM(S): at 21:50

## 2019-03-22 RX ADMIN — Medication 100 MILLIGRAM(S): at 05:29

## 2019-03-22 RX ADMIN — ENOXAPARIN SODIUM 40 MILLIGRAM(S): 100 INJECTION SUBCUTANEOUS at 06:47

## 2019-03-22 RX ADMIN — LACOSAMIDE 100 MILLIGRAM(S): 50 TABLET ORAL at 05:29

## 2019-03-22 RX ADMIN — Medication 100 MILLIGRAM(S): at 22:31

## 2019-03-22 RX ADMIN — Medication 1 SPRAY(S): at 17:30

## 2019-03-22 RX ADMIN — Medication 27.5 MILLIGRAM(S): at 07:50

## 2019-03-22 RX ADMIN — Medication 325 MILLIGRAM(S): at 12:19

## 2019-03-22 RX ADMIN — Medication 1 DROP(S): at 05:29

## 2019-03-22 RX ADMIN — Medication 27.5 MILLIGRAM(S): at 18:06

## 2019-03-22 RX ADMIN — Medication 0.5 MILLIGRAM(S): at 10:45

## 2019-03-22 RX ADMIN — Medication 1 MILLIGRAM(S): at 12:19

## 2019-03-22 RX ADMIN — SODIUM CHLORIDE 75 MILLILITER(S): 9 INJECTION, SOLUTION INTRAVENOUS at 22:31

## 2019-03-22 RX ADMIN — Medication 1 TABLET(S): at 12:19

## 2019-03-22 RX ADMIN — Medication 81 MILLIGRAM(S): at 12:19

## 2019-03-22 RX ADMIN — Medication 28.75 MILLIGRAM(S): at 14:08

## 2019-03-22 RX ADMIN — LEVETIRACETAM 400 MILLIGRAM(S): 250 TABLET, FILM COATED ORAL at 07:00

## 2019-03-22 RX ADMIN — PANTOPRAZOLE SODIUM 40 MILLIGRAM(S): 20 TABLET, DELAYED RELEASE ORAL at 06:47

## 2019-03-22 RX ADMIN — OLANZAPINE 7.5 MILLIGRAM(S): 15 TABLET, FILM COATED ORAL at 21:51

## 2019-03-22 RX ADMIN — LEVETIRACETAM 400 MILLIGRAM(S): 250 TABLET, FILM COATED ORAL at 17:30

## 2019-03-22 RX ADMIN — Medication 1 SPRAY(S): at 05:28

## 2019-03-22 NOTE — PROGRESS NOTE ADULT - SUBJECTIVE AND OBJECTIVE BOX
Patient is a 53y old  Female who presents with a chief complaint of seizures (21 Mar 2019 15:57)      SUBJECTIVE / OVERNIGHT EVENTS:  ...    MEDICATIONS  (STANDING):  artificial  tears Solution 1 Drop(s) Both EYES two times a day  aspirin  chewable 81 milliGRAM(s) Oral daily  buDESOnide   0.5 milliGRAM(s) Respule 0.5 milliGRAM(s) Inhalation daily  dextrose 5% + lactated ringers. 1000 milliLiter(s) (75 mL/Hr) IV Continuous <Continuous>  docusate sodium 100 milliGRAM(s) Oral three times a day  enoxaparin Injectable 40 milliGRAM(s) SubCutaneous every 24 hours  ferrous    sulfate 325 milliGRAM(s) Oral daily  fluticasone propionate 50 MICROgram(s)/spray Nasal Spray 1 Spray(s) Both Nostrils two times a day  folic acid 1 milliGRAM(s) Oral daily  lacosamide 50 milliGRAM(s) Oral once  levETIRAcetam  IVPB 1500 milliGRAM(s) IV Intermittent every 12 hours  multivitamin 1 Tablet(s) Oral daily  OLANZapine 7.5 milliGRAM(s) Oral at bedtime  pantoprazole    Tablet 40 milliGRAM(s) Oral before breakfast  valproate sodium IVPB 500 milliGRAM(s) IV Intermittent two times a day    MEDICATIONS  (PRN):  LORazepam   Injectable 1 milliGRAM(s) IV Push every 6 hours PRN seizure      CAPILLARY BLOOD GLUCOSE      POCT Blood Glucose.: 129 mg/dL (21 Mar 2019 10:43)      I&O's Summary      Vital Signs Last 24 Hrs  T(C): 36.7 (22 Mar 2019 05:13), Max: 36.7 (22 Mar 2019 05:13)  T(F): 98.1 (22 Mar 2019 05:13), Max: 98.1 (22 Mar 2019 05:13)  HR: 71 (22 Mar 2019 05:13) (70 - 119)  BP: 114/66 (22 Mar 2019 05:13) (111/66 - 127/84)  BP(mean): --  RR: 18 (22 Mar 2019 05:13) (17 - 18)  SpO2: 99% (22 Mar 2019 05:13) (97% - 100%)    PHYSICAL EXAM:  GENERAL: middle aged female, MR, nonverbal  HEAD:  Atraumatic, Normocephalic  EYES: EOMI, PERRLA, conjunctiva and sclera clear  NECK: Supple, No JVD  CHEST/LUNG: Clear to auscultation bilaterally; No wheeze  HEART: Regular rate and rhythm; No murmurs, rubs, or gallops  ABDOMEN: Soft, Nontender, Nondistended; Bowel sounds present  EXTREMITIES:  2+ Peripheral Pulses, No clubbing, cyanosis, or edema  PSYCH: calm  NEUROLOGY:   SKIN: No rashes or lesions    LABS:                        12.4   13.68 )-----------( 186      ( 22 Mar 2019 07:20 )             40.1     03-22    145  |  107  |  7   ----------------------------<  108<H>  3.7   |  28  |  0.46<L>    Ca    8.6      22 Mar 2019 07:20  Phos  2.5     03-22  Mg     1.9     03-22    TPro  6.1  /  Alb  3.0<L>  /  TBili  0.3  /  DBili  x   /  AST  51<H>  /  ALT  58<H>  /  AlkPhos  78  03-22              RADIOLOGY & ADDITIONAL TESTS:    Imaging Personally Reviewed:    Consultant(s) Notes Reviewed:      Care Discussed with Consultants/Other Providers: RN, SW, CM, ADS re overall care Patient is a 53y old  Female who presents with a chief complaint of seizures (21 Mar 2019 15:57)      SUBJECTIVE / OVERNIGHT EVENTS:  Pt seen earlier, more alert today.  EEG shows recurrent seizures.    MEDICATIONS  (STANDING):  artificial  tears Solution 1 Drop(s) Both EYES two times a day  aspirin  chewable 81 milliGRAM(s) Oral daily  buDESOnide   0.5 milliGRAM(s) Respule 0.5 milliGRAM(s) Inhalation daily  dextrose 5% + lactated ringers. 1000 milliLiter(s) (75 mL/Hr) IV Continuous <Continuous>  docusate sodium 100 milliGRAM(s) Oral three times a day  enoxaparin Injectable 40 milliGRAM(s) SubCutaneous every 24 hours  ferrous    sulfate 325 milliGRAM(s) Oral daily  fluticasone propionate 50 MICROgram(s)/spray Nasal Spray 1 Spray(s) Both Nostrils two times a day  folic acid 1 milliGRAM(s) Oral daily  lacosamide 50 milliGRAM(s) Oral once  levETIRAcetam  IVPB 1500 milliGRAM(s) IV Intermittent every 12 hours  multivitamin 1 Tablet(s) Oral daily  OLANZapine 7.5 milliGRAM(s) Oral at bedtime  pantoprazole    Tablet 40 milliGRAM(s) Oral before breakfast  valproate sodium IVPB 500 milliGRAM(s) IV Intermittent two times a day    MEDICATIONS  (PRN):  LORazepam   Injectable 1 milliGRAM(s) IV Push every 6 hours PRN seizure      CAPILLARY BLOOD GLUCOSE      POCT Blood Glucose.: 129 mg/dL (21 Mar 2019 10:43)      I&O's Summary      Vital Signs Last 24 Hrs  T(C): 36.7 (22 Mar 2019 05:13), Max: 36.7 (22 Mar 2019 05:13)  T(F): 98.1 (22 Mar 2019 05:13), Max: 98.1 (22 Mar 2019 05:13)  HR: 71 (22 Mar 2019 05:13) (70 - 119)  BP: 114/66 (22 Mar 2019 05:13) (111/66 - 127/84)  BP(mean): --  RR: 18 (22 Mar 2019 05:13) (17 - 18)  SpO2: 99% (22 Mar 2019 05:13) (97% - 100%)    PHYSICAL EXAM:  GENERAL: middle aged female, MR, nonverbal  HEAD:  Atraumatic, Normocephalic  EYES: EOMI, PERRLA, conjunctiva and sclera clear  NECK: Supple, No JVD  CHEST/LUNG: Clear to auscultation bilaterally; No wheeze  HEART: Regular rate and rhythm; No murmurs, rubs, or gallops  ABDOMEN: Soft, Nontender, Nondistended; Bowel sounds present  EXTREMITIES:  2+ Peripheral Pulses, No clubbing, cyanosis, or edema  PSYCH: calm  NEUROLOGY:   SKIN: No rashes or lesions    LABS:                        12.4   13.68 )-----------( 186      ( 22 Mar 2019 07:20 )             40.1     03-22    145  |  107  |  7   ----------------------------<  108<H>  3.7   |  28  |  0.46<L>    Ca    8.6      22 Mar 2019 07:20  Phos  2.5     03-22  Mg     1.9     03-22    TPro  6.1  /  Alb  3.0<L>  /  TBili  0.3  /  DBili  x   /  AST  51<H>  /  ALT  58<H>  /  AlkPhos  78  03-22              RADIOLOGY & ADDITIONAL TESTS:    Imaging Personally Reviewed:    Consultant(s) Notes Reviewed:      Care Discussed with Consultants/Other Providers: RN, SW, CM, ADS re overall care Patient is a 53y old  Female who presents with a chief complaint of seizures (21 Mar 2019 15:57)      SUBJECTIVE / OVERNIGHT EVENTS:  Pt seen earlier, more alert today.  EEG shows recurrent seizures.    MEDICATIONS  (STANDING):  artificial  tears Solution 1 Drop(s) Both EYES two times a day  aspirin  chewable 81 milliGRAM(s) Oral daily  buDESOnide   0.5 milliGRAM(s) Respule 0.5 milliGRAM(s) Inhalation daily  dextrose 5% + lactated ringers. 1000 milliLiter(s) (75 mL/Hr) IV Continuous <Continuous>  docusate sodium 100 milliGRAM(s) Oral three times a day  enoxaparin Injectable 40 milliGRAM(s) SubCutaneous every 24 hours  ferrous    sulfate 325 milliGRAM(s) Oral daily  fluticasone propionate 50 MICROgram(s)/spray Nasal Spray 1 Spray(s) Both Nostrils two times a day  folic acid 1 milliGRAM(s) Oral daily  lacosamide 50 milliGRAM(s) Oral once  levETIRAcetam  IVPB 1500 milliGRAM(s) IV Intermittent every 12 hours  multivitamin 1 Tablet(s) Oral daily  OLANZapine 7.5 milliGRAM(s) Oral at bedtime  pantoprazole    Tablet 40 milliGRAM(s) Oral before breakfast  valproate sodium IVPB 500 milliGRAM(s) IV Intermittent two times a day    MEDICATIONS  (PRN):  LORazepam   Injectable 1 milliGRAM(s) IV Push every 6 hours PRN seizure      CAPILLARY BLOOD GLUCOSE      POCT Blood Glucose.: 129 mg/dL (21 Mar 2019 10:43)      I&O's Summary      Vital Signs Last 24 Hrs  T(C): 36.7 (22 Mar 2019 05:13), Max: 36.7 (22 Mar 2019 05:13)  T(F): 98.1 (22 Mar 2019 05:13), Max: 98.1 (22 Mar 2019 05:13)  HR: 71 (22 Mar 2019 05:13) (70 - 119)  BP: 114/66 (22 Mar 2019 05:13) (111/66 - 127/84)  BP(mean): --  RR: 18 (22 Mar 2019 05:13) (17 - 18)  SpO2: 99% (22 Mar 2019 05:13) (97% - 100%)    PHYSICAL EXAM:  GENERAL: middle aged female, MR, nonverbal  HEAD:  Atraumatic, Normocephalic  EYES: EOMI, PERRLA, conjunctiva and sclera clear  NECK: Supple, No JVD  CHEST/LUNG: Clear to auscultation bilaterally; No wheeze  HEART: Regular rate and rhythm; No murmurs, rubs, or gallops  ABDOMEN: Soft, Nontender, Nondistended; Bowel sounds present  EXTREMITIES:  2+ Peripheral Pulses, No clubbing, cyanosis, or edema  PSYCH: calm  NEUROLOGY: no clear seizure activity noted  SKIN: No rashes or lesions    LABS:                        12.4   13.68 )-----------( 186      ( 22 Mar 2019 07:20 )             40.1     03-22    145  |  107  |  7   ----------------------------<  108<H>  3.7   |  28  |  0.46<L>    Ca    8.6      22 Mar 2019 07:20  Phos  2.5     03-22  Mg     1.9     03-22    TPro  6.1  /  Alb  3.0<L>  /  TBili  0.3  /  DBili  x   /  AST  51<H>  /  ALT  58<H>  /  AlkPhos  78  03-22              RADIOLOGY & ADDITIONAL TESTS:    Imaging Personally Reviewed:    Consultant(s) Notes Reviewed:      Care Discussed with Consultants/Other Providers: RN, SW, CM, ADS re overall care

## 2019-03-22 NOTE — PROGRESS NOTE ADULT - ASSESSMENT
53F pmh CP nonverbal at baseline) and seizure disorder admitted for increased seizures.   (HHA reported seizures [id number], has jerky movements of b/l UE at baseline, per aid baseline not following commands, non-verbal, reported decreased PO intake x2days), now with elevated WBC after prolonged seizures    Problem: Seizures -  need to monitor temp/WBC, will repeat CXR, f/u sats   Assessment and Plan:  increased seizures - d/w neuro re prolonged seizures this am, given extra dose Keppra 500mg x1, c/w Keppra 1500mgBID; d/w neuro Vimpat 100 BID to be weaned off as can exacerbate certain primary epilepsy syndromes  - loaded with depakote 15mg/kg --> 500 bid, f/u level in am, video EEG in progress  - need to explore with family re possible PEG for reliable AEDs   -c/w home Olanzapine     Problem: Adult failure to thrive  Assessment and Plan: secondary to dysphagia. Aids reporting that patient has not been eating at home and has been more lethargic than baseline.    - dysphagia diet, aspiration precautions    -prior discussions with mother (per EMR) appear that she is against PEG but need to readdress   -nutrition c/s    Problem: Functional quadriplegia  Assessment and Plan: -turn regularly to prevent ulcers    -assistance with meals    Problem: ASB (asymptomatic bacteriuria)  Assessment and Plan: patient w/ bacteria and WBC in urine, asymptomatic -> no tenderness in suprapubic area, positive epithelia cells (likely contaminated)   -will monitor for now    Problem: Transaminitis  Assessment and Plan: appear to be chronic, likely in setting of medications    -recheck as outpt    Problem: Cerebral palsy  Assessment and Plan: non verbal, non-ambulatory at baseline    -supportive measures 53F pmh CP nonverbal at baseline) and seizure disorder admitted for increased seizures.   (HHA reported seizures [id number], has jerky movements of b/l UE at baseline, per aid baseline not following commands, non-verbal, reported decreased PO intake x2days), now with elevated WBC after prolonged seizures    Problem: Seizures -  need to monitor temp/WBC,    Assessment and Plan:   - pt with ongoing seizures - d/w neuro, EEG leads removed x2 today --> placed on close observation to hopefully prevent further lead removal, reload depakote, start standing Ativan 1 tid, weaning off Vimpat  as can exacerbate certain primary epilepsy syndromes, c/w Keppra 1500 bid, depakote 500 bid  - NPO while having frequent seizures, IVFs  - need to explore with family re possible PEG for reliable AEDs   -c/w home Olanzapine     Problem: Adult failure to thrive  Assessment and Plan: secondary to dysphagia. Aids reporting that patient has not been eating at home and has been more lethargic than baseline.    - dysphagia diet, aspiration precautions --> NPO for now   -prior discussions with mother (per EMR) appear that she is against PEG but need to readdress   -nutrition c/s    Problem: Functional quadriplegia  Assessment and Plan: -turn regularly to prevent ulcers    -assistance with meals    Problem: ASB (asymptomatic bacteriuria)  Assessment and Plan: patient w/ bacteria and WBC in urine, asymptomatic -> no tenderness in suprapubic area, positive epithelia cells (likely contaminated)   -will monitor for now    Problem: Transaminitis  Assessment and Plan: appear to be chronic, likely in setting of medications     Problem: Cerebral palsy  Assessment and Plan: non verbal, non-ambulatory at baseline    -supportive measures

## 2019-03-22 NOTE — PROGRESS NOTE ADULT - SUBJECTIVE AND OBJECTIVE BOX
Subjective:Interval History - Patient seen at bedside eating her lunch. She is more alert today. She has had numerous electrographic seizures since yesterday.     Objective:   Vital Signs Last 24 Hrs  T(C): 36.2 (22 Mar 2019 13:04), Max: 36.7 (22 Mar 2019 05:13)  T(F): 97.2 (22 Mar 2019 13:04), Max: 98.1 (22 Mar 2019 05:13)  HR: 90 (22 Mar 2019 13:04) (71 - 90)  BP: 102/59 (22 Mar 2019 13:04) (102/59 - 130/92)  BP(mean): --  RR: 18 (22 Mar 2019 13:04) (17 - 18)  SpO2: 97% (22 Mar 2019 13:04) (95% - 100%)    awake, alert  no verbal output  does not follow commands  points, tracks    Other:    03-21    142  |  104  |  8   ----------------------------<  133<H>  4.4   |  25  |  0.51    Ca    8.9      21 Mar 2019 07:05  Phos  3.1     03-21  Mg     1.9     03-21    TPro  7.7  /  Alb  3.6  /  TBili  0.3  /  DBili  x   /  AST  68<H>  /  ALT  71<H>  /  AlkPhos  92  03-21    LIVER FUNCTIONS - ( 21 Mar 2019 07:05 )  Alb: 3.6 g/dL / Pro: 7.7 g/dL / ALK PHOS: 92 u/L / ALT: 71 u/L / AST: 68 u/L / GGT: x                                 14.3   7.35  )-----------( 238      ( 21 Mar 2019 07:05 )             47.2       Radiology      EEG Summary/Classification:  Abnormal EEG in awake state  -Sharp Waves, Generalized, Maximum Bi-Frontal, sometimes with frequency of 0.5-1Hz taking on an appearance of slow spike and wave (rare).  -Sharp Waves, Regional, bilateral independent Frontotemporal, Maximum F7/T7, F8/T8, Fp1/F3 and Fp2/F4  -Multiple electrographic seizures with non-localizable, diffuse onset.  Three of these appeared to originate over the right anterior head region (with grunting and slight head turn to the left) - times above  -Moderate background slowing     EEG Impression/Clinical Correlate:  1.	There were numerous electrographic seizures captured with diffuse onset, three with right anterior onset that had grunting sounds with head turn to the left  2.	Risk for seizures from the bilateral anterior head regions   3.	Risk for generalized seizures activity  4.	Moderate nonspecific diffuse or multifocal cerebral dysfunction    MEDICATIONS  (STANDING):  artificial  tears Solution 1 Drop(s) Both EYES two times a day  aspirin  chewable 81 milliGRAM(s) Oral daily  buDESOnide   0.5 milliGRAM(s) Respule 0.5 milliGRAM(s) Inhalation daily  docusate sodium 100 milliGRAM(s) Oral three times a day  enoxaparin Injectable 40 milliGRAM(s) SubCutaneous every 24 hours  ferrous    sulfate 325 milliGRAM(s) Oral daily  fluticasone propionate 50 MICROgram(s)/spray Nasal Spray 1 Spray(s) Both Nostrils two times a day  folic acid 1 milliGRAM(s) Oral daily  lacosamide IVPB 100 milliGRAM(s) IV Intermittent every 12 hours  levETIRAcetam  IVPB 1500 milliGRAM(s) IV Intermittent every 12 hours  multivitamin 1 Tablet(s) Oral daily  OLANZapine 7.5 milliGRAM(s) Oral at bedtime  pantoprazole    Tablet 40 milliGRAM(s) Oral before breakfast  valproate sodium IVPB 500 milliGRAM(s) IV Intermittent two times a day    MEDICATIONS  (PRN):  LORazepam   Injectable 1 milliGRAM(s) IV Push every 6 hours PRN seizure

## 2019-03-22 NOTE — PROGRESS NOTE ADULT - ASSESSMENT
53F pmh CP (nonverbal at baseline) and seizure disorder admitted for increased seizures. EEG over the past 24 hours shows numerous seizures.     EEG Impression/Clinical Correlate:  1.There were numerous electrographic seizures captured with diffuse onset, three with right anterior onset that had grunting sounds with head turn to the left  2.Risk for seizures from the bilateral anterior head regions   3.Risk for generalized seizures activity  4.Moderate nonspecific diffuse or multifocal cerebral dysfunction    These findings can be seen in patients with multifocal or symptomatic generalized epilepsy.      Above EEG results reviewed with epilepsy attending. Patient has had numerous electrographic and clinical seizures over the past day. Her depakote level is currently subtherapeutic at 38.7 this morning.    Plan:  Load additional 750 mg IV depakote now  Cont Keppra 1500 mg BID  Cont Depakote 500 BID   check depakote level again in the AM  Start standing ativan 1 mg IV TID for now given increased seizure activity on EEG  Continue VEEG monitoring  Continue to taper off Vimpat 53F pmh CP (nonverbal at baseline) and seizure disorder admitted for increased seizures. EEG over the past 24 hours shows numerous seizures.     EEG Impression/Clinical Correlate:  1.There were numerous electrographic seizures captured with diffuse onset, three with right anterior onset that had grunting sounds with head turn to the left  2.Risk for seizures from the bilateral anterior head regions   3.Risk for generalized seizures activity  4.Moderate nonspecific diffuse or multifocal cerebral dysfunction    These findings can be seen in patients with multifocal or symptomatic generalized epilepsy.      Above EEG results reviewed with epilepsy attending. Patient has had numerous electrographic and clinical seizures over the past day. Her depakote level is currently subtherapeutic at 38.7 this morning.    Plan:  Load additional 750 mg IV depakote now  Cont Keppra 1500 mg BID  Cont Depakote 500 BID   check depakote level again in the AM  Start standing ativan 1 mg IV TID for now given increased seizure activity on EEG  Continue VEEG monitoring  Continue to taper off Vimpat   If zyprexa IM is needed for agitation, do not combine with ativan 53F pmh CP (nonverbal at baseline) and seizure disorder admitted for increased seizures. EEG over the past 24 hours shows numerous seizures.     EEG Impression/Clinical Correlate:  1.There were numerous electrographic seizures captured with diffuse onset, three with right anterior onset that had grunting sounds with head turn to the left  2.Risk for seizures from the bilateral anterior head regions   3.Risk for generalized seizures activity  4.Moderate nonspecific diffuse or multifocal cerebral dysfunction    These findings can be seen in patients with multifocal or symptomatic generalized epilepsy.      Above EEG results reviewed with epilepsy attending. Patient has had numerous electrographic and clinical seizures over the past day. Her depakote level is currently subtherapeutic at 38.7 this morning.    Plan:  Load additional 750 mg IV depakote now   Cont Keppra 1500 mg BID  Cont Depakote 500 BID   check depakote level again in the AM  Start standing ativan 1 mg IV TID for now given increased seizure activity on EEG  Continue VEEG monitoring  Continue to taper off Vimpat   If zyprexa IM is needed for agitation, do not combine with ativan

## 2019-03-22 NOTE — PROGRESS NOTE ADULT - ATTENDING COMMENTS
Patient seen and examined with neurology team and above note reviewed and I agree with assessment and plan as outlined. Patient had numerous seizures on EEG overnight and will now be loaded with extra depakote and continue 500mg BID and check level in morning.  Continue keppra and standing ativan 1 mg every 8 hours but may hold for extreme sedation or respiratory issues.   Continue VEEG overnight and seizure precautions and medical management and supportive care.

## 2019-03-23 DIAGNOSIS — E43 UNSPECIFIED SEVERE PROTEIN-CALORIE MALNUTRITION: ICD-10-CM

## 2019-03-23 DIAGNOSIS — G80.9 CEREBRAL PALSY, UNSPECIFIED: ICD-10-CM

## 2019-03-23 LAB
ALBUMIN SERPL ELPH-MCNC: 2.7 G/DL — LOW (ref 3.3–5)
ALP SERPL-CCNC: 85 U/L — SIGNIFICANT CHANGE UP (ref 40–120)
ALT FLD-CCNC: 67 U/L — HIGH (ref 4–33)
ANION GAP SERPL CALC-SCNC: 13 MMO/L — SIGNIFICANT CHANGE UP (ref 7–14)
AST SERPL-CCNC: 65 U/L — HIGH (ref 4–32)
BILIRUB SERPL-MCNC: 0.3 MG/DL — SIGNIFICANT CHANGE UP (ref 0.2–1.2)
BUN SERPL-MCNC: 4 MG/DL — LOW (ref 7–23)
CALCIUM SERPL-MCNC: 8.7 MG/DL — SIGNIFICANT CHANGE UP (ref 8.4–10.5)
CHLORIDE SERPL-SCNC: 109 MMOL/L — HIGH (ref 98–107)
CO2 SERPL-SCNC: 21 MMOL/L — LOW (ref 22–31)
CREAT SERPL-MCNC: 0.45 MG/DL — LOW (ref 0.5–1.3)
GLUCOSE SERPL-MCNC: 71 MG/DL — SIGNIFICANT CHANGE UP (ref 70–99)
HCT VFR BLD CALC: 46.5 % — HIGH (ref 34.5–45)
HGB BLD-MCNC: 14.5 G/DL — SIGNIFICANT CHANGE UP (ref 11.5–15.5)
MAGNESIUM SERPL-MCNC: 1.9 MG/DL — SIGNIFICANT CHANGE UP (ref 1.6–2.6)
MCHC RBC-ENTMCNC: 30.7 PG — SIGNIFICANT CHANGE UP (ref 27–34)
MCHC RBC-ENTMCNC: 31.2 % — LOW (ref 32–36)
MCV RBC AUTO: 98.3 FL — SIGNIFICANT CHANGE UP (ref 80–100)
NRBC # FLD: 0 K/UL — LOW (ref 25–125)
PHOSPHATE SERPL-MCNC: 2.8 MG/DL — SIGNIFICANT CHANGE UP (ref 2.5–4.5)
PLATELET # BLD AUTO: 179 K/UL — SIGNIFICANT CHANGE UP (ref 150–400)
PMV BLD: 11.6 FL — SIGNIFICANT CHANGE UP (ref 7–13)
POTASSIUM SERPL-MCNC: 4.3 MMOL/L — SIGNIFICANT CHANGE UP (ref 3.5–5.3)
POTASSIUM SERPL-SCNC: 4.3 MMOL/L — SIGNIFICANT CHANGE UP (ref 3.5–5.3)
PROT SERPL-MCNC: 6.5 G/DL — SIGNIFICANT CHANGE UP (ref 6–8.3)
RBC # BLD: 4.73 M/UL — SIGNIFICANT CHANGE UP (ref 3.8–5.2)
RBC # FLD: 13.7 % — SIGNIFICANT CHANGE UP (ref 10.3–14.5)
SODIUM SERPL-SCNC: 143 MMOL/L — SIGNIFICANT CHANGE UP (ref 135–145)
VALPROATE SERPL-MCNC: 50.4 UG/ML — SIGNIFICANT CHANGE UP (ref 50–100)
WBC # BLD: 7.87 K/UL — SIGNIFICANT CHANGE UP (ref 3.8–10.5)
WBC # FLD AUTO: 7.87 K/UL — SIGNIFICANT CHANGE UP (ref 3.8–10.5)

## 2019-03-23 PROCEDURE — 95951: CPT | Mod: 26

## 2019-03-23 PROCEDURE — 99233 SBSQ HOSP IP/OBS HIGH 50: CPT

## 2019-03-23 RX ORDER — LEVETIRACETAM 250 MG/1
1000 TABLET, FILM COATED ORAL
Refills: 0 | Status: DISCONTINUED | OUTPATIENT
Start: 2019-03-23 | End: 2019-03-26

## 2019-03-23 RX ORDER — VALPROIC ACID (AS SODIUM SALT) 250 MG/5ML
500 SOLUTION, ORAL ORAL EVERY 8 HOURS
Refills: 0 | Status: DISCONTINUED | OUTPATIENT
Start: 2019-03-23 | End: 2019-03-26

## 2019-03-23 RX ADMIN — Medication 325 MILLIGRAM(S): at 11:56

## 2019-03-23 RX ADMIN — Medication 27.5 MILLIGRAM(S): at 06:23

## 2019-03-23 RX ADMIN — Medication 0.5 MILLIGRAM(S): at 11:36

## 2019-03-23 RX ADMIN — Medication 100 MILLIGRAM(S): at 14:44

## 2019-03-23 RX ADMIN — OLANZAPINE 7.5 MILLIGRAM(S): 15 TABLET, FILM COATED ORAL at 22:52

## 2019-03-23 RX ADMIN — Medication 1 MILLIGRAM(S): at 11:56

## 2019-03-23 RX ADMIN — LACOSAMIDE 110 MILLIGRAM(S): 50 TABLET ORAL at 07:58

## 2019-03-23 RX ADMIN — PANTOPRAZOLE SODIUM 40 MILLIGRAM(S): 20 TABLET, DELAYED RELEASE ORAL at 06:02

## 2019-03-23 RX ADMIN — ENOXAPARIN SODIUM 40 MILLIGRAM(S): 100 INJECTION SUBCUTANEOUS at 06:02

## 2019-03-23 RX ADMIN — LEVETIRACETAM 400 MILLIGRAM(S): 250 TABLET, FILM COATED ORAL at 22:52

## 2019-03-23 RX ADMIN — Medication 1 SPRAY(S): at 17:07

## 2019-03-23 RX ADMIN — Medication 1 MILLIGRAM(S): at 15:17

## 2019-03-23 RX ADMIN — Medication 100 MILLIGRAM(S): at 06:53

## 2019-03-23 RX ADMIN — LACOSAMIDE 110 MILLIGRAM(S): 50 TABLET ORAL at 17:07

## 2019-03-23 RX ADMIN — Medication 1 MILLIGRAM(S): at 06:02

## 2019-03-23 RX ADMIN — Medication 1 TABLET(S): at 11:57

## 2019-03-23 RX ADMIN — Medication 81 MILLIGRAM(S): at 11:56

## 2019-03-23 RX ADMIN — Medication 1 SPRAY(S): at 06:01

## 2019-03-23 RX ADMIN — Medication 1 MILLIGRAM(S): at 22:54

## 2019-03-23 RX ADMIN — Medication 1 DROP(S): at 06:01

## 2019-03-23 RX ADMIN — Medication 1 DROP(S): at 17:07

## 2019-03-23 RX ADMIN — LEVETIRACETAM 400 MILLIGRAM(S): 250 TABLET, FILM COATED ORAL at 06:01

## 2019-03-23 NOTE — PROGRESS NOTE ADULT - SUBJECTIVE AND OBJECTIVE BOX
Patient is a 53y old  Female who presents with a chief complaint of seizures         SUBJECTIVE / OVERNIGHT EVENTS: on vEEG; 1:1 at bedside; pt seems to engage while I speak to her; says OK      MEDICATIONS  (STANDING):  artificial  tears Solution 1 Drop(s) Both EYES two times a day  aspirin  chewable 81 milliGRAM(s) Oral daily  buDESOnide   0.5 milliGRAM(s) Respule 0.5 milliGRAM(s) Inhalation daily  dextrose 5% + lactated ringers. 1000 milliLiter(s) (75 mL/Hr) IV Continuous <Continuous>  docusate sodium 100 milliGRAM(s) Oral three times a day  enoxaparin Injectable 40 milliGRAM(s) SubCutaneous every 24 hours  ferrous    sulfate 325 milliGRAM(s) Oral daily  fluticasone propionate 50 MICROgram(s)/spray Nasal Spray 1 Spray(s) Both Nostrils two times a day  folic acid 1 milliGRAM(s) Oral daily  lacosamide IVPB 50 milliGRAM(s) IV Intermittent every 12 hours  levETIRAcetam  IVPB 1500 milliGRAM(s) IV Intermittent every 12 hours  LORazepam   Injectable 1 milliGRAM(s) IV Push every 8 hours  multivitamin 1 Tablet(s) Oral daily  OLANZapine 7.5 milliGRAM(s) Oral at bedtime  pantoprazole    Tablet 40 milliGRAM(s) Oral before breakfast  valproate sodium IVPB 500 milliGRAM(s) IV Intermittent two times a day    MEDICATIONS  (PRN):  LORazepam   Injectable 1 milliGRAM(s) IV Push every 6 hours PRN seizure      Vital Signs Last 24 Hrs  T(F): 97.1 (23 Mar 2019 04:01), Max: 97.2 (22 Mar 2019 13:04)  HR: 76 (23 Mar 2019 04:01) (76 - 90)  BP: 136/72 (23 Mar 2019 04:01) (100/62 - 138/80)  RR: 18 (23 Mar 2019 04:01) (17 - 18)  SpO2: 97% (23 Mar 2019 04:01) (96% - 97%)          PHYSICAL EXAM  GENERAL: NAD, well-developed  HEAD: poor dentition  EYES: EOMI, PERRLA, conjunctiva and sclera clear  CHEST/LUNG: grossly clear ant  HEART: Regular rate and rhythm;   ABDOMEN: Soft, Nontender, Nondistended; Bowel sounds present  EXTREMITIES: No clubbing, cyanosis, or edema      LABS:                        14.5   7.87  )-----------( 179      ( 23 Mar 2019 07:05 )             46.5     03-23    143  |  109<H>  |  4<L>  ----------------------------<  71  4.3   |  21<L>  |  0.45<L>    Ca    8.7      23 Mar 2019 07:05  Phos  2.8     03-23  Mg     1.9     03-23    TPro  6.5  /  Alb  2.7<L>  /  TBili  0.3  /  DBili  x   /  AST  65<H>  /  ALT  67<H>  /  AlkPhos  85  03-23

## 2019-03-23 NOTE — PROGRESS NOTE ADULT - ASSESSMENT
53F pmh CP nonverbal at baseline) and seizure disorder admitted for increased seizures.   (HHA reported seizures [id number], has jerky movements of b/l UE at baseline, per aid baseline not following commands, non-verbal, reported decreased PO intake x2days), now with elevated WBC after prolonged seizures    Problem: Seizures -  need to monitor temp/WBC,    Assessment and Plan:   - pt with ongoing seizures - d/w neuro, EEG leads removed x2 today --> placed on close observation to hopefully prevent further lead removal, reload depakote, start standing Ativan 1 tid, weaning off Vimpat  as can exacerbate certain primary epilepsy syndromes, c/w Keppra 1500 bid, depakote 500 bid  - NPO while having frequent seizures, IVFs  - need to explore with family re possible PEG for reliable AEDs   -c/w home Olanzapine     Problem: Adult failure to thrive  Assessment and Plan: secondary to dysphagia. Aids reporting that patient has not been eating at home and has been more lethargic than baseline.    - dysphagia diet, aspiration precautions --> NPO for now   -prior discussions with mother (per EMR) appear that she is against PEG but need to readdress   -nutrition c/s    Problem: Functional quadriplegia  Assessment and Plan: -turn regularly to prevent ulcers    -assistance with meals    Problem: ASB (asymptomatic bacteriuria)  Assessment and Plan: patient w/ bacteria and WBC in urine, asymptomatic -> no tenderness in suprapubic area, positive epithelia cells (likely contaminated)   -will monitor for now    Problem: Transaminitis  Assessment and Plan: appear to be chronic, likely in setting of medications     Problem: Cerebral palsy  Assessment and Plan: non verbal, non-ambulatory at baseline    -supportive measures 53F pmh CP nonverbal at baseline) and seizure disorder admitted for increased seizures.   (HHA reported seizures [id number], has jerky movements of b/l UE at baseline, per aid baseline not following commands, non-verbal, reported decreased PO intake x2days), now with elevated WBC after prolonged seizures

## 2019-03-23 NOTE — PROGRESS NOTE ADULT - PROBLEM SELECTOR PLAN 1
followed closely by neuro  loaded with depakote yesterday and ativan increased  cont vEEG  if cont to have seizures, would consider MICU eval for status epilepticus followed closely by neuro  loaded with depakote yesterday and ativan increased  cont vEEG  leukocytosis resolved  if cont to have seizures, would consider MICU eval for status epilepticus

## 2019-03-24 LAB
ALBUMIN SERPL ELPH-MCNC: 3.2 G/DL — LOW (ref 3.3–5)
ALP SERPL-CCNC: 85 U/L — SIGNIFICANT CHANGE UP (ref 40–120)
ALT FLD-CCNC: 87 U/L — HIGH (ref 4–33)
ANION GAP SERPL CALC-SCNC: 9 MMO/L — SIGNIFICANT CHANGE UP (ref 7–14)
AST SERPL-CCNC: 86 U/L — HIGH (ref 4–32)
BILIRUB SERPL-MCNC: 0.3 MG/DL — SIGNIFICANT CHANGE UP (ref 0.2–1.2)
BUN SERPL-MCNC: 3 MG/DL — LOW (ref 7–23)
CALCIUM SERPL-MCNC: 9.1 MG/DL — SIGNIFICANT CHANGE UP (ref 8.4–10.5)
CHLORIDE SERPL-SCNC: 106 MMOL/L — SIGNIFICANT CHANGE UP (ref 98–107)
CO2 SERPL-SCNC: 32 MMOL/L — HIGH (ref 22–31)
CREAT SERPL-MCNC: 0.5 MG/DL — SIGNIFICANT CHANGE UP (ref 0.5–1.3)
GLUCOSE SERPL-MCNC: 100 MG/DL — HIGH (ref 70–99)
HBA1C BLD-MCNC: 5.3 % — SIGNIFICANT CHANGE UP (ref 4–5.6)
HCT VFR BLD CALC: 43.3 % — SIGNIFICANT CHANGE UP (ref 34.5–45)
HGB BLD-MCNC: 13.2 G/DL — SIGNIFICANT CHANGE UP (ref 11.5–15.5)
MAGNESIUM SERPL-MCNC: 1.9 MG/DL — SIGNIFICANT CHANGE UP (ref 1.6–2.6)
MCHC RBC-ENTMCNC: 30.5 % — LOW (ref 32–36)
MCHC RBC-ENTMCNC: 30.6 PG — SIGNIFICANT CHANGE UP (ref 27–34)
MCV RBC AUTO: 100.5 FL — HIGH (ref 80–100)
NRBC # FLD: 0 K/UL — LOW (ref 25–125)
PHOSPHATE SERPL-MCNC: 3.8 MG/DL — SIGNIFICANT CHANGE UP (ref 2.5–4.5)
PLATELET # BLD AUTO: 199 K/UL — SIGNIFICANT CHANGE UP (ref 150–400)
PMV BLD: 11.8 FL — SIGNIFICANT CHANGE UP (ref 7–13)
POTASSIUM SERPL-MCNC: 4 MMOL/L — SIGNIFICANT CHANGE UP (ref 3.5–5.3)
POTASSIUM SERPL-SCNC: 4 MMOL/L — SIGNIFICANT CHANGE UP (ref 3.5–5.3)
PROT SERPL-MCNC: 6.7 G/DL — SIGNIFICANT CHANGE UP (ref 6–8.3)
RBC # BLD: 4.31 M/UL — SIGNIFICANT CHANGE UP (ref 3.8–5.2)
RBC # FLD: 13.3 % — SIGNIFICANT CHANGE UP (ref 10.3–14.5)
SODIUM SERPL-SCNC: 147 MMOL/L — HIGH (ref 135–145)
WBC # BLD: 4.94 K/UL — SIGNIFICANT CHANGE UP (ref 3.8–10.5)
WBC # FLD AUTO: 4.94 K/UL — SIGNIFICANT CHANGE UP (ref 3.8–10.5)

## 2019-03-24 PROCEDURE — 95951: CPT | Mod: 26

## 2019-03-24 PROCEDURE — 99233 SBSQ HOSP IP/OBS HIGH 50: CPT

## 2019-03-24 RX ADMIN — Medication 27.5 MILLIGRAM(S): at 14:47

## 2019-03-24 RX ADMIN — Medication 1 SPRAY(S): at 06:42

## 2019-03-24 RX ADMIN — Medication 1 MILLIGRAM(S): at 14:05

## 2019-03-24 RX ADMIN — Medication 1 SPRAY(S): at 17:10

## 2019-03-24 RX ADMIN — Medication 27.5 MILLIGRAM(S): at 22:28

## 2019-03-24 RX ADMIN — OLANZAPINE 7.5 MILLIGRAM(S): 15 TABLET, FILM COATED ORAL at 22:03

## 2019-03-24 RX ADMIN — ENOXAPARIN SODIUM 40 MILLIGRAM(S): 100 INJECTION SUBCUTANEOUS at 06:42

## 2019-03-24 RX ADMIN — PANTOPRAZOLE SODIUM 40 MILLIGRAM(S): 20 TABLET, DELAYED RELEASE ORAL at 06:41

## 2019-03-24 RX ADMIN — Medication 1 MILLIGRAM(S): at 22:06

## 2019-03-24 RX ADMIN — Medication 27.5 MILLIGRAM(S): at 00:25

## 2019-03-24 RX ADMIN — Medication 100 MILLIGRAM(S): at 06:41

## 2019-03-24 RX ADMIN — LEVETIRACETAM 400 MILLIGRAM(S): 250 TABLET, FILM COATED ORAL at 14:05

## 2019-03-24 RX ADMIN — Medication 100 MILLIGRAM(S): at 22:03

## 2019-03-24 RX ADMIN — LEVETIRACETAM 400 MILLIGRAM(S): 250 TABLET, FILM COATED ORAL at 06:41

## 2019-03-24 RX ADMIN — Medication 81 MILLIGRAM(S): at 14:05

## 2019-03-24 RX ADMIN — LEVETIRACETAM 400 MILLIGRAM(S): 250 TABLET, FILM COATED ORAL at 22:12

## 2019-03-24 RX ADMIN — Medication 1 TABLET(S): at 14:05

## 2019-03-24 RX ADMIN — SODIUM CHLORIDE 75 MILLILITER(S): 9 INJECTION, SOLUTION INTRAVENOUS at 00:25

## 2019-03-24 RX ADMIN — Medication 27.5 MILLIGRAM(S): at 07:16

## 2019-03-24 RX ADMIN — Medication 325 MILLIGRAM(S): at 14:05

## 2019-03-24 RX ADMIN — Medication 1 DROP(S): at 17:10

## 2019-03-24 RX ADMIN — Medication 100 MILLIGRAM(S): at 14:05

## 2019-03-24 RX ADMIN — Medication 1 DROP(S): at 06:42

## 2019-03-24 RX ADMIN — Medication 1 MILLIGRAM(S): at 06:41

## 2019-03-24 NOTE — EEG REPORT - NS EEG TEXT BOX
Study Date: 3/23-3/24/2019    History:  CONCERN FOR SEIZURE    MEDICATIONS  (STANDING):  artificial  tears Solution 1 Drop(s) Both EYES two times a day  aspirin  chewable 81 milliGRAM(s) Oral daily  buDESOnide   0.5 milliGRAM(s) Respule 0.5 milliGRAM(s) Inhalation daily  dextrose 5% + lactated ringers. 1000 milliLiter(s) (75 mL/Hr) IV Continuous <Continuous>  docusate sodium 100 milliGRAM(s) Oral three times a day  enoxaparin Injectable 40 milliGRAM(s) SubCutaneous every 24 hours  ferrous    sulfate 325 milliGRAM(s) Oral daily  fluticasone propionate 50 MICROgram(s)/spray Nasal Spray 1 Spray(s) Both Nostrils two times a day  folic acid 1 milliGRAM(s) Oral daily  lacosamide IVPB 50 milliGRAM(s) IV Intermittent once  levETIRAcetam  IVPB 1000 milliGRAM(s) IV Intermittent <User Schedule>  LORazepam   Injectable 1 milliGRAM(s) IV Push every 8 hours  multivitamin 1 Tablet(s) Oral daily  OLANZapine 7.5 milliGRAM(s) Oral at bedtime  pantoprazole    Tablet 40 milliGRAM(s) Oral before breakfast  valproate sodium IVPB 500 milliGRAM(s) IV Intermittent every 8 hours    Study Interpretation:    FINDINGS:  The background was continuous, spontaneously variable and reactive. There was no PDR. The background consisted of continuous diffuse polymorphic and rhythmic delta and theta frequencies.     Sleep Background:  Drowsiness and Stage II sleep transients were not recorded.    Other Non-Epileptiform Findings:  None were present.    Interictal Epileptiform Activity:   -Sharp Waves, Generalized, Maximum Bi-Frontal sometimes in short runs up to 8 seconds with a frequency of 0.5-1Hz  -Sharp Waves, Regional, bilateral independent Frontotemporal, Maximum F7/T7 and F8/T8    Events: none recorded    Activation Procedures:   Hyperventilation was not performed.    Photic stimulation was not performed.    Artifacts:  Intermittent myogenic and movement artifacts were noted.    ECG:  The heart rate on single channel ECG was predominantly between 60-70 BPM.    EEG Summary/Classification:  Abnormal EEG in awake/drowsy/asleep  state  -Sharp Waves, Generalized, Maximum Bi-Frontal, sometimes with frequency of 0.5-1Hz taking on an appearance of slow spike and wave (rare).  -Sharp Waves, Regional, bilateral independent Frontotemporal, Maximum F7/T7, F8/T8, Fp1/F3 and Fp2/F4  -Moderate background slowing     EEG Impression/Clinical Correlate:  1.	No seizures recorded.  2.	Risk for seizures from the bilateral frontal regions   3.	Risk for generalized seizures activity  4.	Moderate nonspecific diffuse or multifocal cerebral dysfunction  5.	EEG leads were removed by 15:00 3/23    These findings can be seen in patients with multifocal or symptomatic generalized epilepsy.    Christianne Bonilla MD  Epilepsy Fellow Study Date: 3/23-3/24/2019    History:  CONCERN FOR SEIZURE    MEDICATIONS  (STANDING):  artificial  tears Solution 1 Drop(s) Both EYES two times a day  aspirin  chewable 81 milliGRAM(s) Oral daily  buDESOnide   0.5 milliGRAM(s) Respule 0.5 milliGRAM(s) Inhalation daily  dextrose 5% + lactated ringers. 1000 milliLiter(s) (75 mL/Hr) IV Continuous <Continuous>  docusate sodium 100 milliGRAM(s) Oral three times a day  enoxaparin Injectable 40 milliGRAM(s) SubCutaneous every 24 hours  ferrous    sulfate 325 milliGRAM(s) Oral daily  fluticasone propionate 50 MICROgram(s)/spray Nasal Spray 1 Spray(s) Both Nostrils two times a day  folic acid 1 milliGRAM(s) Oral daily  lacosamide IVPB 50 milliGRAM(s) IV Intermittent once  levETIRAcetam  IVPB 1000 milliGRAM(s) IV Intermittent <User Schedule>  LORazepam   Injectable 1 milliGRAM(s) IV Push every 8 hours  multivitamin 1 Tablet(s) Oral daily  OLANZapine 7.5 milliGRAM(s) Oral at bedtime  pantoprazole    Tablet 40 milliGRAM(s) Oral before breakfast  valproate sodium IVPB 500 milliGRAM(s) IV Intermittent every 8 hours    Study Interpretation:    FINDINGS:  The background was continuous, spontaneously variable and reactive. There was no PDR. The background consisted of continuous diffuse polymorphic and rhythmic theta frequencies. The background is disorganized.    Sleep Background:  Drowsiness and Stage II sleep transients were not recorded.    Other Non-Epileptiform Findings:  None were present.    Interictal Epileptiform Activity:   None were present.    Events: none recorded    Activation Procedures:   Hyperventilation was not performed.    Photic stimulation was not performed.    Artifacts:  Intermittent myogenic and movement artifacts were noted.    ECG:  The heart rate on single channel ECG was predominantly between 60-70 BPM.    EEG Summary/Classification:  Abnormal EEG in awake/drowsy/asleep  state    -Moderate background slowing   -disorganization  EEG Impression/Clinical Correlate:  1.	No seizures recorded.  2.	Moderate nonspecific diffuse or multifocal cerebral dysfunction  3.	EEG leads were removed by 15:00 3/23    Christianne Bonilla MD  Epilepsy Fellow

## 2019-03-24 NOTE — PROGRESS NOTE ADULT - PROBLEM SELECTOR PLAN 1
followed closely by neuro  loaded with depakote yesterday and ativan increased  cont vEEG  leukocytosis resolved  seems better but off vEEG

## 2019-03-24 NOTE — PROGRESS NOTE ADULT - SUBJECTIVE AND OBJECTIVE BOX
Patient is a 53y old  Female who presents with a chief complaint of seizures        SUBJECTIVE / OVERNIGHT EVENTS: pulled off EEG leads 3/23 afternoon; seems more alert today; pushing my hand away as I try to examine her; reached out to me when I put my hand out to hold hers; then pulled it away and yelled      MEDICATIONS  (STANDING):  artificial  tears Solution 1 Drop(s) Both EYES two times a day  aspirin  chewable 81 milliGRAM(s) Oral daily  buDESOnide   0.5 milliGRAM(s) Respule 0.5 milliGRAM(s) Inhalation daily  dextrose 5% + lactated ringers. 1000 milliLiter(s) (75 mL/Hr) IV Continuous <Continuous>  docusate sodium 100 milliGRAM(s) Oral three times a day  enoxaparin Injectable 40 milliGRAM(s) SubCutaneous every 24 hours  ferrous    sulfate 325 milliGRAM(s) Oral daily  fluticasone propionate 50 MICROgram(s)/spray Nasal Spray 1 Spray(s) Both Nostrils two times a day  folic acid 1 milliGRAM(s) Oral daily  lacosamide IVPB 50 milliGRAM(s) IV Intermittent once  levETIRAcetam  IVPB 1000 milliGRAM(s) IV Intermittent <User Schedule>  LORazepam   Injectable 1 milliGRAM(s) IV Push every 8 hours  multivitamin 1 Tablet(s) Oral daily  OLANZapine 7.5 milliGRAM(s) Oral at bedtime  pantoprazole    Tablet 40 milliGRAM(s) Oral before breakfast  valproate sodium IVPB 500 milliGRAM(s) IV Intermittent every 8 hours    MEDICATIONS  (PRN):  LORazepam   Injectable 1 milliGRAM(s) IV Push every 6 hours PRN seizure      Vital Signs Last 24 Hrs  T(F): 97.5 (24 Mar 2019 06:59), Max: 97.5 (24 Mar 2019 06:59)  HR: 81 (24 Mar 2019 06:59) (76 - 82)  BP: 135/82 (24 Mar 2019 06:59) (128/73 - 135/82)  RR: 18 (24 Mar 2019 06:59) (17 - 18)  SpO2: 98% (24 Mar 2019 06:59) (97% - 98%)          PHYSICAL EXAM  GENERAL: NAD, well-developed  EYES: EOMI, PERRLA, conjunctiva and sclera clear  CHEST/LUNG: grossly b/l AE  HEART: Regular rate and rhythm;   ABDOMEN: Soft, Nontender, Nondistended; Bowel sounds present  EXTREMITIES: No clubbing, cyanosis, or edema  PSYCH: more alert today      LABS:                        13.2   4.94  )-----------( 199      ( 24 Mar 2019 06:40 )             43.3     03-24    147<H>  |  106  |  3<L>  ----------------------------<  100<H>  4.0   |  32<H>  |  0.50    Ca    9.1      24 Mar 2019 06:40  Phos  3.8     03-24  Mg     1.9     03-24    TPro  6.7  /  Alb  3.2<L>  /  TBili  0.3  /  DBili  x   /  AST  86<H>  /  ALT  87<H>  /  AlkPhos  85  03-24

## 2019-03-24 NOTE — PROGRESS NOTE ADULT - ASSESSMENT
53F pmh CP nonverbal at baseline) and seizure disorder admitted for increased seizures.   (HHA reported seizures [id number], has jerky movements of b/l UE at baseline, per aid baseline not following commands, non-verbal, reported decreased PO intake x2days), now with elevated WBC after prolonged seizures

## 2019-03-25 LAB
ALBUMIN SERPL ELPH-MCNC: 2.9 G/DL — LOW (ref 3.3–5)
ALP SERPL-CCNC: 73 U/L — SIGNIFICANT CHANGE UP (ref 40–120)
ALT FLD-CCNC: 73 U/L — HIGH (ref 4–33)
ANION GAP SERPL CALC-SCNC: 10 MMO/L — SIGNIFICANT CHANGE UP (ref 7–14)
AST SERPL-CCNC: 60 U/L — HIGH (ref 4–32)
BILIRUB SERPL-MCNC: 0.3 MG/DL — SIGNIFICANT CHANGE UP (ref 0.2–1.2)
BUN SERPL-MCNC: 4 MG/DL — LOW (ref 7–23)
CALCIUM SERPL-MCNC: 8.6 MG/DL — SIGNIFICANT CHANGE UP (ref 8.4–10.5)
CHLORIDE SERPL-SCNC: 106 MMOL/L — SIGNIFICANT CHANGE UP (ref 98–107)
CO2 SERPL-SCNC: 29 MMOL/L — SIGNIFICANT CHANGE UP (ref 22–31)
CREAT SERPL-MCNC: 0.44 MG/DL — LOW (ref 0.5–1.3)
GLUCOSE SERPL-MCNC: 100 MG/DL — HIGH (ref 70–99)
HCT VFR BLD CALC: 39.3 % — SIGNIFICANT CHANGE UP (ref 34.5–45)
HGB BLD-MCNC: 12.3 G/DL — SIGNIFICANT CHANGE UP (ref 11.5–15.5)
MAGNESIUM SERPL-MCNC: 1.8 MG/DL — SIGNIFICANT CHANGE UP (ref 1.6–2.6)
MCHC RBC-ENTMCNC: 31.2 PG — SIGNIFICANT CHANGE UP (ref 27–34)
MCHC RBC-ENTMCNC: 31.3 % — LOW (ref 32–36)
MCV RBC AUTO: 99.7 FL — SIGNIFICANT CHANGE UP (ref 80–100)
NRBC # FLD: 0 K/UL — LOW (ref 25–125)
PHOSPHATE SERPL-MCNC: 4 MG/DL — SIGNIFICANT CHANGE UP (ref 2.5–4.5)
PLATELET # BLD AUTO: 188 K/UL — SIGNIFICANT CHANGE UP (ref 150–400)
PMV BLD: 11.9 FL — SIGNIFICANT CHANGE UP (ref 7–13)
POTASSIUM SERPL-MCNC: 3.4 MMOL/L — LOW (ref 3.5–5.3)
POTASSIUM SERPL-SCNC: 3.4 MMOL/L — LOW (ref 3.5–5.3)
PROT SERPL-MCNC: 6 G/DL — SIGNIFICANT CHANGE UP (ref 6–8.3)
RBC # BLD: 3.94 M/UL — SIGNIFICANT CHANGE UP (ref 3.8–5.2)
RBC # FLD: 13.5 % — SIGNIFICANT CHANGE UP (ref 10.3–14.5)
SODIUM SERPL-SCNC: 145 MMOL/L — SIGNIFICANT CHANGE UP (ref 135–145)
VALPROATE SERPL-MCNC: 70.4 UG/ML — SIGNIFICANT CHANGE UP (ref 50–100)
WBC # BLD: 4.67 K/UL — SIGNIFICANT CHANGE UP (ref 3.8–10.5)
WBC # FLD AUTO: 4.67 K/UL — SIGNIFICANT CHANGE UP (ref 3.8–10.5)

## 2019-03-25 PROCEDURE — 99233 SBSQ HOSP IP/OBS HIGH 50: CPT

## 2019-03-25 PROCEDURE — 99231 SBSQ HOSP IP/OBS SF/LOW 25: CPT

## 2019-03-25 RX ORDER — VALPROIC ACID (AS SODIUM SALT) 250 MG/5ML
500 SOLUTION, ORAL ORAL ONCE
Refills: 0 | Status: COMPLETED | OUTPATIENT
Start: 2019-03-25 | End: 2019-03-25

## 2019-03-25 RX ORDER — LEVETIRACETAM 250 MG/1
1000 TABLET, FILM COATED ORAL ONCE
Refills: 0 | Status: COMPLETED | OUTPATIENT
Start: 2019-03-25 | End: 2019-03-25

## 2019-03-25 RX ORDER — SODIUM CHLORIDE 9 MG/ML
1000 INJECTION, SOLUTION INTRAVENOUS
Refills: 0 | Status: DISCONTINUED | OUTPATIENT
Start: 2019-03-25 | End: 2019-03-27

## 2019-03-25 RX ORDER — POTASSIUM CHLORIDE 20 MEQ
10 PACKET (EA) ORAL
Refills: 0 | Status: COMPLETED | OUTPATIENT
Start: 2019-03-25 | End: 2019-03-25

## 2019-03-25 RX ADMIN — Medication 1 MILLIGRAM(S): at 23:30

## 2019-03-25 RX ADMIN — Medication 100 MILLIGRAM(S): at 05:22

## 2019-03-25 RX ADMIN — Medication 100 MILLIEQUIVALENT(S): at 09:34

## 2019-03-25 RX ADMIN — Medication 100 MILLIEQUIVALENT(S): at 08:00

## 2019-03-25 RX ADMIN — Medication 100 MILLIGRAM(S): at 13:45

## 2019-03-25 RX ADMIN — PANTOPRAZOLE SODIUM 40 MILLIGRAM(S): 20 TABLET, DELAYED RELEASE ORAL at 05:22

## 2019-03-25 RX ADMIN — Medication 1 DROP(S): at 05:21

## 2019-03-25 RX ADMIN — Medication 27.5 MILLIGRAM(S): at 05:44

## 2019-03-25 RX ADMIN — Medication 81 MILLIGRAM(S): at 13:45

## 2019-03-25 RX ADMIN — ENOXAPARIN SODIUM 40 MILLIGRAM(S): 100 INJECTION SUBCUTANEOUS at 05:23

## 2019-03-25 RX ADMIN — Medication 325 MILLIGRAM(S): at 13:45

## 2019-03-25 RX ADMIN — Medication 27.5 MILLIGRAM(S): at 13:55

## 2019-03-25 RX ADMIN — Medication 1 SPRAY(S): at 17:31

## 2019-03-25 RX ADMIN — Medication 500 MILLIGRAM(S): at 23:31

## 2019-03-25 RX ADMIN — LEVETIRACETAM 1000 MILLIGRAM(S): 250 TABLET, FILM COATED ORAL at 23:31

## 2019-03-25 RX ADMIN — Medication 1 MILLIGRAM(S): at 13:45

## 2019-03-25 RX ADMIN — Medication 100 MILLIEQUIVALENT(S): at 10:53

## 2019-03-25 RX ADMIN — SODIUM CHLORIDE 50 MILLILITER(S): 9 INJECTION, SOLUTION INTRAVENOUS at 17:34

## 2019-03-25 RX ADMIN — Medication 1 SPRAY(S): at 05:21

## 2019-03-25 RX ADMIN — LEVETIRACETAM 400 MILLIGRAM(S): 250 TABLET, FILM COATED ORAL at 13:45

## 2019-03-25 RX ADMIN — Medication 1 MILLIGRAM(S): at 13:46

## 2019-03-25 RX ADMIN — OLANZAPINE 7.5 MILLIGRAM(S): 15 TABLET, FILM COATED ORAL at 22:05

## 2019-03-25 RX ADMIN — Medication 1 MILLIGRAM(S): at 05:21

## 2019-03-25 RX ADMIN — LEVETIRACETAM 400 MILLIGRAM(S): 250 TABLET, FILM COATED ORAL at 05:30

## 2019-03-25 RX ADMIN — Medication 1 DROP(S): at 17:31

## 2019-03-25 RX ADMIN — Medication 1 TABLET(S): at 13:45

## 2019-03-25 RX ADMIN — Medication 0.5 MILLIGRAM(S): at 10:36

## 2019-03-25 RX ADMIN — Medication 100 MILLIGRAM(S): at 22:05

## 2019-03-25 NOTE — PROGRESS NOTE ADULT - ASSESSMENT
53F pmh CP (nonverbal at baseline) and seizure disorder admitted for increased seizures. EEG showed improvement over last few days. No electrographic seizure noted on VEEG from 03/23/2014 study. Study was disconnected by patient at 3 pm.     Plan:    increase Keppra 1500 mg BID   Depakote 500 every 8 hours    check depakote level   Start standing ativan 1 mg IV TID for now - change to PO, if patient is able to tolerate   Continue VEEG monitoring  DC vimpat   If zyprexa IM is needed for agitation, do not combine with ativan 53F pmh CP (nonverbal at baseline) and seizure disorder admitted for increased seizures. EEG showed improvement over last few days. No electrographic seizure noted on VEEG from 03/23/2014 study. Study was disconnected by patient at 3 pm.     Plan:    continue keppra 1g 3x/day  continue Depakote 500 every 8 hours  - level 3/25 was good at 70.4  continue standing ativan 1 mg IV TID for now - change to PO, if patient is able to tolerate   since she pulled off EEG and the record was without seizures before she pulled it off and clincally she is improved, would keep off eeg for now  DC vimpat   If zyprexa IM is needed for agitation, do not combine with ativan

## 2019-03-25 NOTE — PROGRESS NOTE ADULT - SUBJECTIVE AND OBJECTIVE BOX
Patient is a 53y old  Female who presents with a chief complaint of seizures (25 Mar 2019 10:55)      SUBJECTIVE / OVERNIGHT EVENTS:  Pt seen earlier, no clear seizure activity.  Extends R arm toward examiner.    MEDICATIONS  (STANDING):  artificial  tears Solution 1 Drop(s) Both EYES two times a day  aspirin  chewable 81 milliGRAM(s) Oral daily  buDESOnide   0.5 milliGRAM(s) Respule 0.5 milliGRAM(s) Inhalation daily  dextrose 5% + lactated ringers. 1000 milliLiter(s) (75 mL/Hr) IV Continuous <Continuous>  docusate sodium 100 milliGRAM(s) Oral three times a day  enoxaparin Injectable 40 milliGRAM(s) SubCutaneous every 24 hours  ferrous    sulfate 325 milliGRAM(s) Oral daily  fluticasone propionate 50 MICROgram(s)/spray Nasal Spray 1 Spray(s) Both Nostrils two times a day  folic acid 1 milliGRAM(s) Oral daily  lacosamide IVPB 50 milliGRAM(s) IV Intermittent once  levETIRAcetam  IVPB 1000 milliGRAM(s) IV Intermittent <User Schedule>  LORazepam   Injectable 1 milliGRAM(s) IV Push every 8 hours  multivitamin 1 Tablet(s) Oral daily  OLANZapine 7.5 milliGRAM(s) Oral at bedtime  pantoprazole    Tablet 40 milliGRAM(s) Oral before breakfast  valproate sodium IVPB 500 milliGRAM(s) IV Intermittent every 8 hours    MEDICATIONS  (PRN):  LORazepam   Injectable 1 milliGRAM(s) IV Push every 6 hours PRN seizure      CAPILLARY BLOOD GLUCOSE          I&O's Summary      Vital Signs Last 24 Hrs  T(C): 36.8 (25 Mar 2019 13:18), Max: 36.9 (24 Mar 2019 20:50)  T(F): 98.3 (25 Mar 2019 13:18), Max: 98.4 (24 Mar 2019 20:50)  HR: 86 (25 Mar 2019 13:18) (65 - 88)  BP: 110/74 (25 Mar 2019 13:18) (109/76 - 125/82)  BP(mean): --  RR: 18 (25 Mar 2019 13:18) (18 - 18)  SpO2: 98% (25 Mar 2019 13:18) (98% - 100%)    PHYSICAL EXAM:  GENERAL: middle aged female, MR, nonverbal  HEAD:  Atraumatic, Normocephalic  EYES: EOMI, PERRLA, conjunctiva and sclera clear  NECK: Supple, No JVD  CHEST/LUNG: Clear to auscultation bilaterally; No wheeze  HEART: Regular rate and rhythm; No murmurs, rubs, or gallops  ABDOMEN: Soft, Nontender, Nondistended; Bowel sounds present  EXTREMITIES:  2+ Peripheral Pulses, No clubbing, cyanosis, or edema  PSYCH: calm  NEUROLOGY: no clear seizure activity noted  SKIN: No rashes or lesions    LABS:                        12.3   4.67  )-----------( 188      ( 25 Mar 2019 05:15 )             39.3     03-25    145  |  106  |  4<L>  ----------------------------<  100<H>  3.4<L>   |  29  |  0.44<L>    Ca    8.6      25 Mar 2019 05:15  Phos  4.0     03-25  Mg     1.8     03-25    TPro  6.0  /  Alb  2.9<L>  /  TBili  0.3  /  DBili  x   /  AST  60<H>  /  ALT  73<H>  /  AlkPhos  73  03-25              RADIOLOGY & ADDITIONAL TESTS:    Imaging Personally Reviewed:    Consultant(s) Notes Reviewed:      Care Discussed with Consultants/Other Providers: RN, SW, CM, ADS re overall care; neuro re seizures

## 2019-03-25 NOTE — PROGRESS NOTE ADULT - PROBLEM SELECTOR PLAN 1
followed closely by neuro- AEDs adjusted - weaning off Vimpat, added depakote, Keppra increased, put on ATC Ativan  leukocytosis resolved, clinically stabilized - will retry PO - d/w staff from group home - won't eat puree, diet chopped to 1/4 inch pieces

## 2019-03-25 NOTE — PROGRESS NOTE ADULT - SUBJECTIVE AND OBJECTIVE BOX
Subjective: Interval History - Patient seen at bedside, alert.     Vital Signs Last 24 Hrs  T(C): 36.9 (25 Mar 2019 05:14), Max: 37 (24 Mar 2019 14:16)  T(F): 98.4 (25 Mar 2019 05:14), Max: 98.6 (24 Mar 2019 14:16)  HR: 88 (25 Mar 2019 05:14) (65 - 88)  BP: 109/76 (25 Mar 2019 05:14) (109/76 - 146/88)  BP(mean): --  RR: 18 (25 Mar 2019 05:14) (18 - 18)  SpO2: 100% (25 Mar 2019 05:14) (98% - 100%)    awake, alert  no verbal output  does not follow commands  points, tracks    Other:    03-21    142  |  104  |  8   ----------------------------<  133<H>  4.4   |  25  |  0.51    Ca    8.9      21 Mar 2019 07:05  Phos  3.1     03-21  Mg     1.9     03-21    TPro  7.7  /  Alb  3.6  /  TBili  0.3  /  DBili  x   /  AST  68<H>  /  ALT  71<H>  /  AlkPhos  92  03-21    LIVER FUNCTIONS - ( 21 Mar 2019 07:05 )  Alb: 3.6 g/dL / Pro: 7.7 g/dL / ALK PHOS: 92 u/L / ALT: 71 u/L / AST: 68 u/L / GGT: x                                 14.3   7.35  )-----------( 238      ( 21 Mar 2019 07:05 )             47.2       Radiology    EEG Summary/Classification:  Abnormal EEG in awake/drowsy/asleep  state    -Moderate background slowing   -disorganization  EEG Impression/Clinical Correlate:  1.	No seizures recorded.  2.	Moderate nonspecific diffuse or multifocal cerebral dysfunction  3.	EEG leads were removed by 15:00 3/23      MEDICATIONS  (STANDING):  artificial  tears Solution 1 Drop(s) Both EYES two times a day  aspirin  chewable 81 milliGRAM(s) Oral daily  buDESOnide   0.5 milliGRAM(s) Respule 0.5 milliGRAM(s) Inhalation daily  dextrose 5% + lactated ringers. 1000 milliLiter(s) (75 mL/Hr) IV Continuous <Continuous>  docusate sodium 100 milliGRAM(s) Oral three times a day  enoxaparin Injectable 40 milliGRAM(s) SubCutaneous every 24 hours  ferrous    sulfate 325 milliGRAM(s) Oral daily  fluticasone propionate 50 MICROgram(s)/spray Nasal Spray 1 Spray(s) Both Nostrils two times a day  folic acid 1 milliGRAM(s) Oral daily  lacosamide IVPB 50 milliGRAM(s) IV Intermittent once  levETIRAcetam  IVPB 1000 milliGRAM(s) IV Intermittent <User Schedule>  LORazepam   Injectable 1 milliGRAM(s) IV Push every 8 hours  multivitamin 1 Tablet(s) Oral daily  OLANZapine 7.5 milliGRAM(s) Oral at bedtime  pantoprazole    Tablet 40 milliGRAM(s) Oral before breakfast  valproate sodium IVPB 500 milliGRAM(s) IV Intermittent every 8 hours    MEDICATIONS  (PRN):  LORazepam   Injectable 1 milliGRAM(s) IV Push every 6 hours PRN seizure Subjective: Interval History - Patient seen at bedside, alert.     Vital Signs Last 24 Hrs  T(C): 36.9 (25 Mar 2019 05:14), Max: 37 (24 Mar 2019 14:16)  T(F): 98.4 (25 Mar 2019 05:14), Max: 98.6 (24 Mar 2019 14:16)  HR: 88 (25 Mar 2019 05:14) (65 - 88)  BP: 109/76 (25 Mar 2019 05:14) (109/76 - 146/88)  BP(mean): --  RR: 18 (25 Mar 2019 05:14) (18 - 18)  SpO2: 100% (25 Mar 2019 05:14) (98% - 100%)    awake, alert  no verbal output  does not follow commands  moving all 4 spontaneously and withdrawing away from when limb examined    Other:    03-21    142  |  104  |  8   ----------------------------<  133<H>  4.4   |  25  |  0.51    Ca    8.9      21 Mar 2019 07:05  Phos  3.1     03-21  Mg     1.9     03-21    TPro  7.7  /  Alb  3.6  /  TBili  0.3  /  DBili  x   /  AST  68<H>  /  ALT  71<H>  /  AlkPhos  92  03-21    LIVER FUNCTIONS - ( 21 Mar 2019 07:05 )  Alb: 3.6 g/dL / Pro: 7.7 g/dL / ALK PHOS: 92 u/L / ALT: 71 u/L / AST: 68 u/L / GGT: x                                 14.3   7.35  )-----------( 238      ( 21 Mar 2019 07:05 )             47.2       Radiology    EEG Summary/Classification:  Abnormal EEG in awake/drowsy/asleep  state    -Moderate background slowing   -disorganization  EEG Impression/Clinical Correlate:  1.	No seizures recorded.  2.	Moderate nonspecific diffuse or multifocal cerebral dysfunction  3.	EEG leads were removed by 15:00 3/23      MEDICATIONS  (STANDING):  artificial  tears Solution 1 Drop(s) Both EYES two times a day  aspirin  chewable 81 milliGRAM(s) Oral daily  buDESOnide   0.5 milliGRAM(s) Respule 0.5 milliGRAM(s) Inhalation daily  dextrose 5% + lactated ringers. 1000 milliLiter(s) (75 mL/Hr) IV Continuous <Continuous>  docusate sodium 100 milliGRAM(s) Oral three times a day  enoxaparin Injectable 40 milliGRAM(s) SubCutaneous every 24 hours  ferrous    sulfate 325 milliGRAM(s) Oral daily  fluticasone propionate 50 MICROgram(s)/spray Nasal Spray 1 Spray(s) Both Nostrils two times a day  folic acid 1 milliGRAM(s) Oral daily  lacosamide IVPB 50 milliGRAM(s) IV Intermittent once  levETIRAcetam  IVPB 1000 milliGRAM(s) IV Intermittent <User Schedule>  LORazepam   Injectable 1 milliGRAM(s) IV Push every 8 hours  multivitamin 1 Tablet(s) Oral daily  OLANZapine 7.5 milliGRAM(s) Oral at bedtime  pantoprazole    Tablet 40 milliGRAM(s) Oral before breakfast  valproate sodium IVPB 500 milliGRAM(s) IV Intermittent every 8 hours    MEDICATIONS  (PRN):  LORazepam   Injectable 1 milliGRAM(s) IV Push every 6 hours PRN seizure

## 2019-03-25 NOTE — PROGRESS NOTE ADULT - PROBLEM SELECTOR PLAN 4
retrying PO as noted above  mom has refused PEG in the past - left message for mother retrying PO as noted above; decrease IVFs  mom has refused PEG in the past - left message for mother

## 2019-03-25 NOTE — PROGRESS NOTE ADULT - PROBLEM SELECTOR PLAN 2
minimally verbal at baseline  will retry PO - d/w staff from group home - won't eat puree, diet chopped to 1/4 inch pieces, aspiration precautions

## 2019-03-25 NOTE — PROGRESS NOTE ADULT - ATTENDING COMMENTS
I edited note above- no AED changes today, continue to monitor clinically on the new regimen which has helped

## 2019-03-26 DIAGNOSIS — T68.XXXA HYPOTHERMIA, INITIAL ENCOUNTER: ICD-10-CM

## 2019-03-26 LAB
ANION GAP SERPL CALC-SCNC: 9 MMO/L — SIGNIFICANT CHANGE UP (ref 7–14)
APPEARANCE UR: CLEAR — SIGNIFICANT CHANGE UP
BASOPHILS # BLD AUTO: 0.02 K/UL — SIGNIFICANT CHANGE UP (ref 0–0.2)
BASOPHILS NFR BLD AUTO: 0.5 % — SIGNIFICANT CHANGE UP (ref 0–2)
BILIRUB UR-MCNC: NEGATIVE — SIGNIFICANT CHANGE UP
BLOOD UR QL VISUAL: NEGATIVE — SIGNIFICANT CHANGE UP
BUN SERPL-MCNC: 4 MG/DL — LOW (ref 7–23)
CALCIUM SERPL-MCNC: 8.6 MG/DL — SIGNIFICANT CHANGE UP (ref 8.4–10.5)
CHLORIDE SERPL-SCNC: 104 MMOL/L — SIGNIFICANT CHANGE UP (ref 98–107)
CO2 SERPL-SCNC: 30 MMOL/L — SIGNIFICANT CHANGE UP (ref 22–31)
COLOR SPEC: SIGNIFICANT CHANGE UP
CREAT SERPL-MCNC: 0.45 MG/DL — LOW (ref 0.5–1.3)
EOSINOPHIL # BLD AUTO: 0.24 K/UL — SIGNIFICANT CHANGE UP (ref 0–0.5)
EOSINOPHIL NFR BLD AUTO: 6.5 % — HIGH (ref 0–6)
GLUCOSE SERPL-MCNC: 129 MG/DL — HIGH (ref 70–99)
GLUCOSE UR-MCNC: NEGATIVE — SIGNIFICANT CHANGE UP
HCT VFR BLD CALC: 40.2 % — SIGNIFICANT CHANGE UP (ref 34.5–45)
HCT VFR BLD CALC: 40.2 % — SIGNIFICANT CHANGE UP (ref 34.5–45)
HGB BLD-MCNC: 12.4 G/DL — SIGNIFICANT CHANGE UP (ref 11.5–15.5)
HGB BLD-MCNC: 12.4 G/DL — SIGNIFICANT CHANGE UP (ref 11.5–15.5)
IMM GRANULOCYTES NFR BLD AUTO: 0.3 % — SIGNIFICANT CHANGE UP (ref 0–1.5)
KETONES UR-MCNC: NEGATIVE — SIGNIFICANT CHANGE UP
LACTATE SERPL-SCNC: 0.9 MMOL/L — SIGNIFICANT CHANGE UP (ref 0.5–2)
LEUKOCYTE ESTERASE UR-ACNC: NEGATIVE — SIGNIFICANT CHANGE UP
LEVETIRACETAM SERPL-MCNC: 50.9 MCG/ML — HIGH (ref 12–46)
LYMPHOCYTES # BLD AUTO: 1.38 K/UL — SIGNIFICANT CHANGE UP (ref 1–3.3)
LYMPHOCYTES # BLD AUTO: 37.6 % — SIGNIFICANT CHANGE UP (ref 13–44)
MCHC RBC-ENTMCNC: 30.8 % — LOW (ref 32–36)
MCHC RBC-ENTMCNC: 30.8 % — LOW (ref 32–36)
MCHC RBC-ENTMCNC: 30.8 PG — SIGNIFICANT CHANGE UP (ref 27–34)
MCHC RBC-ENTMCNC: 30.8 PG — SIGNIFICANT CHANGE UP (ref 27–34)
MCV RBC AUTO: 99.8 FL — SIGNIFICANT CHANGE UP (ref 80–100)
MCV RBC AUTO: 99.8 FL — SIGNIFICANT CHANGE UP (ref 80–100)
MONOCYTES # BLD AUTO: 0.31 K/UL — SIGNIFICANT CHANGE UP (ref 0–0.9)
MONOCYTES NFR BLD AUTO: 8.4 % — SIGNIFICANT CHANGE UP (ref 2–14)
NEUTROPHILS # BLD AUTO: 1.71 K/UL — LOW (ref 1.8–7.4)
NEUTROPHILS NFR BLD AUTO: 46.7 % — SIGNIFICANT CHANGE UP (ref 43–77)
NITRITE UR-MCNC: NEGATIVE — SIGNIFICANT CHANGE UP
NRBC # FLD: 0 K/UL — SIGNIFICANT CHANGE UP (ref 0–0)
NRBC # FLD: 0 K/UL — SIGNIFICANT CHANGE UP (ref 0–0)
PH UR: 6.5 — SIGNIFICANT CHANGE UP (ref 5–8)
PLATELET # BLD AUTO: 168 K/UL — SIGNIFICANT CHANGE UP (ref 150–400)
PLATELET # BLD AUTO: 168 K/UL — SIGNIFICANT CHANGE UP (ref 150–400)
PMV BLD: 11.3 FL — SIGNIFICANT CHANGE UP (ref 7–13)
PMV BLD: 11.3 FL — SIGNIFICANT CHANGE UP (ref 7–13)
POTASSIUM SERPL-MCNC: 3.6 MMOL/L — SIGNIFICANT CHANGE UP (ref 3.5–5.3)
POTASSIUM SERPL-SCNC: 3.6 MMOL/L — SIGNIFICANT CHANGE UP (ref 3.5–5.3)
PROT UR-MCNC: NEGATIVE — SIGNIFICANT CHANGE UP
RBC # BLD: 4.03 M/UL — SIGNIFICANT CHANGE UP (ref 3.8–5.2)
RBC # BLD: 4.03 M/UL — SIGNIFICANT CHANGE UP (ref 3.8–5.2)
RBC # FLD: 13.3 % — SIGNIFICANT CHANGE UP (ref 10.3–14.5)
RBC # FLD: 13.3 % — SIGNIFICANT CHANGE UP (ref 10.3–14.5)
SODIUM SERPL-SCNC: 143 MMOL/L — SIGNIFICANT CHANGE UP (ref 135–145)
SP GR SPEC: 1.01 — SIGNIFICANT CHANGE UP (ref 1–1.04)
UROBILINOGEN FLD QL: NORMAL — SIGNIFICANT CHANGE UP
WBC # BLD: 3.64 K/UL — LOW (ref 3.8–10.5)
WBC # BLD: 3.64 K/UL — LOW (ref 3.8–10.5)
WBC # FLD AUTO: 3.64 K/UL — LOW (ref 3.8–10.5)
WBC # FLD AUTO: 3.64 K/UL — LOW (ref 3.8–10.5)

## 2019-03-26 PROCEDURE — 71045 X-RAY EXAM CHEST 1 VIEW: CPT | Mod: 26

## 2019-03-26 PROCEDURE — 99233 SBSQ HOSP IP/OBS HIGH 50: CPT

## 2019-03-26 RX ORDER — PIPERACILLIN AND TAZOBACTAM 4; .5 G/20ML; G/20ML
3.38 INJECTION, POWDER, LYOPHILIZED, FOR SOLUTION INTRAVENOUS EVERY 8 HOURS
Refills: 0 | Status: DISCONTINUED | OUTPATIENT
Start: 2019-03-26 | End: 2019-03-26

## 2019-03-26 RX ORDER — VALPROIC ACID (AS SODIUM SALT) 250 MG/5ML
500 SOLUTION, ORAL ORAL ONCE
Refills: 0 | Status: COMPLETED | OUTPATIENT
Start: 2019-03-26 | End: 2019-03-26

## 2019-03-26 RX ORDER — PIPERACILLIN AND TAZOBACTAM 4; .5 G/20ML; G/20ML
3.38 INJECTION, POWDER, LYOPHILIZED, FOR SOLUTION INTRAVENOUS ONCE
Refills: 0 | Status: COMPLETED | OUTPATIENT
Start: 2019-03-26 | End: 2019-03-26

## 2019-03-26 RX ORDER — DIVALPROEX SODIUM 500 MG/1
500 TABLET, DELAYED RELEASE ORAL EVERY 8 HOURS
Refills: 0 | Status: DISCONTINUED | OUTPATIENT
Start: 2019-03-26 | End: 2019-04-09

## 2019-03-26 RX ORDER — PIPERACILLIN AND TAZOBACTAM 4; .5 G/20ML; G/20ML
3.38 INJECTION, POWDER, LYOPHILIZED, FOR SOLUTION INTRAVENOUS EVERY 8 HOURS
Refills: 0 | Status: DISCONTINUED | OUTPATIENT
Start: 2019-03-26 | End: 2019-03-31

## 2019-03-26 RX ORDER — LEVETIRACETAM 250 MG/1
1000 TABLET, FILM COATED ORAL ONCE
Refills: 0 | Status: COMPLETED | OUTPATIENT
Start: 2019-03-26 | End: 2019-03-26

## 2019-03-26 RX ORDER — LEVETIRACETAM 250 MG/1
1000 TABLET, FILM COATED ORAL
Refills: 0 | Status: DISCONTINUED | OUTPATIENT
Start: 2019-03-26 | End: 2019-04-09

## 2019-03-26 RX ADMIN — PANTOPRAZOLE SODIUM 40 MILLIGRAM(S): 20 TABLET, DELAYED RELEASE ORAL at 05:42

## 2019-03-26 RX ADMIN — SODIUM CHLORIDE 50 MILLILITER(S): 9 INJECTION, SOLUTION INTRAVENOUS at 15:56

## 2019-03-26 RX ADMIN — Medication 100 MILLIGRAM(S): at 13:56

## 2019-03-26 RX ADMIN — Medication 81 MILLIGRAM(S): at 12:15

## 2019-03-26 RX ADMIN — Medication 0.5 MILLIGRAM(S): at 10:03

## 2019-03-26 RX ADMIN — Medication 325 MILLIGRAM(S): at 12:16

## 2019-03-26 RX ADMIN — Medication 1 DROP(S): at 05:42

## 2019-03-26 RX ADMIN — Medication 100 MILLIGRAM(S): at 05:42

## 2019-03-26 RX ADMIN — Medication 1 DROP(S): at 18:26

## 2019-03-26 RX ADMIN — Medication 1 MILLIGRAM(S): at 22:43

## 2019-03-26 RX ADMIN — SODIUM CHLORIDE 50 MILLILITER(S): 9 INJECTION, SOLUTION INTRAVENOUS at 22:43

## 2019-03-26 RX ADMIN — ENOXAPARIN SODIUM 40 MILLIGRAM(S): 100 INJECTION SUBCUTANEOUS at 05:42

## 2019-03-26 RX ADMIN — Medication 1 MILLIGRAM(S): at 13:54

## 2019-03-26 RX ADMIN — PIPERACILLIN AND TAZOBACTAM 200 GRAM(S): 4; .5 INJECTION, POWDER, LYOPHILIZED, FOR SOLUTION INTRAVENOUS at 15:51

## 2019-03-26 RX ADMIN — DIVALPROEX SODIUM 500 MILLIGRAM(S): 500 TABLET, DELAYED RELEASE ORAL at 13:53

## 2019-03-26 RX ADMIN — PIPERACILLIN AND TAZOBACTAM 25 GRAM(S): 4; .5 INJECTION, POWDER, LYOPHILIZED, FOR SOLUTION INTRAVENOUS at 22:43

## 2019-03-26 RX ADMIN — Medication 1 SPRAY(S): at 18:26

## 2019-03-26 RX ADMIN — Medication 1 MILLIGRAM(S): at 06:07

## 2019-03-26 RX ADMIN — LEVETIRACETAM 1000 MILLIGRAM(S): 250 TABLET, FILM COATED ORAL at 06:36

## 2019-03-26 RX ADMIN — Medication 1 MILLIGRAM(S): at 12:16

## 2019-03-26 RX ADMIN — LEVETIRACETAM 1000 MILLIGRAM(S): 250 TABLET, FILM COATED ORAL at 22:43

## 2019-03-26 RX ADMIN — Medication 1 SPRAY(S): at 05:42

## 2019-03-26 RX ADMIN — Medication 1 TABLET(S): at 12:16

## 2019-03-26 RX ADMIN — Medication 500 MILLIGRAM(S): at 06:08

## 2019-03-26 RX ADMIN — DIVALPROEX SODIUM 500 MILLIGRAM(S): 500 TABLET, DELAYED RELEASE ORAL at 22:43

## 2019-03-26 RX ADMIN — LEVETIRACETAM 1000 MILLIGRAM(S): 250 TABLET, FILM COATED ORAL at 13:54

## 2019-03-26 RX ADMIN — Medication 100 MILLIGRAM(S): at 22:43

## 2019-03-26 RX ADMIN — OLANZAPINE 7.5 MILLIGRAM(S): 15 TABLET, FILM COATED ORAL at 22:43

## 2019-03-26 NOTE — PROGRESS NOTE ADULT - PROBLEM SELECTOR PLAN 1
infection w/u - CXR, blood cultures, UA/cx, old IV sites no clear phlebitis, no diarrhea, check labs with lactate, will empirically start Zosyn for possible aspiration, f/u closely

## 2019-03-26 NOTE — PROGRESS NOTE ADULT - PROBLEM SELECTOR PLAN 2
followed closely by neuro- AEDs adjusted - weaned off Vimpat, c/w depakote 500 q8, Keppra 1000 q8,  Ativan 1q8  d/w staff here pt tolerating diet chopped to 1/4 inch pieces, meds in applesauce

## 2019-03-26 NOTE — PROGRESS NOTE ADULT - ATTENDING COMMENTS
d/w [facility]  Lisbethkarly Barlow [phone] --> d/w mother Brianna Ponce [phone] - reviewed overall including better control of seizures, changing to oral meds and then new hypothermia.  Mother made decision in past for no feeding tube.

## 2019-03-26 NOTE — PROGRESS NOTE ADULT - SUBJECTIVE AND OBJECTIVE BOX
Patient is a 53y old  Female who presents with a chief complaint of seizures (25 Mar 2019 14:41)      SUBJECTIVE / OVERNIGHT EVENTS:  Pt seen earlier, resting calmly, seems at baseline.  Later called for hypothermia, tried to examine pt's arms (previous IV sites), pt actively resisting.    MEDICATIONS  (STANDING):  artificial  tears Solution 1 Drop(s) Both EYES two times a day  aspirin  chewable 81 milliGRAM(s) Oral daily  buDESOnide   0.5 milliGRAM(s) Respule 0.5 milliGRAM(s) Inhalation daily  dextrose 5% + lactated ringers. 1000 milliLiter(s) (50 mL/Hr) IV Continuous <Continuous>  diVALproex  milliGRAM(s) Oral every 8 hours  docusate sodium 100 milliGRAM(s) Oral three times a day  enoxaparin Injectable 40 milliGRAM(s) SubCutaneous every 24 hours  ferrous    sulfate 325 milliGRAM(s) Oral daily  fluticasone propionate 50 MICROgram(s)/spray Nasal Spray 1 Spray(s) Both Nostrils two times a day  folic acid 1 milliGRAM(s) Oral daily  levETIRAcetam 1000 milliGRAM(s) Oral <User Schedule>  LORazepam     Tablet 1 milliGRAM(s) Oral every 8 hours  multivitamin 1 Tablet(s) Oral daily  OLANZapine 7.5 milliGRAM(s) Oral at bedtime  pantoprazole    Tablet 40 milliGRAM(s) Oral before breakfast  piperacillin/tazobactam IVPB. 3.375 Gram(s) IV Intermittent once  piperacillin/tazobactam IVPB. 3.375 Gram(s) IV Intermittent every 8 hours    MEDICATIONS  (PRN):  LORazepam   Injectable 1 milliGRAM(s) IntraMuscular every 6 hours PRN seizure      CAPILLARY BLOOD GLUCOSE          I&O's Summary      Vital Signs Last 24 Hrs  T(C): 32.9 (26 Mar 2019 13:18), Max: 36.9 (25 Mar 2019 20:10)  T(F): 91.3 (26 Mar 2019 13:18), Max: 98.4 (25 Mar 2019 20:10)  HR: 67 (26 Mar 2019 13:18) (62 - 71)  BP: 128/85 (26 Mar 2019 13:18) (121/72 - 141/88)  BP(mean): --  RR: 18 (26 Mar 2019 13:18) (18 - 18)  SpO2: 100% (26 Mar 2019 13:18) (98% - 100%)    PHYSICAL EXAM:  GENERAL: middle aged female, MR, alert, grunting and purposefully resisting when try to examine her arms  HEAD:  Atraumatic, Normocephalic  EYES: EOMI, PERRLA, conjunctiva and sclera clear  NECK: Supple, No JVD  CHEST/LUNG: Clear to auscultation bilaterally; No wheeze  HEART: Regular rate and rhythm; No murmurs, rubs, or gallops  ABDOMEN: Soft, Nontender, Nondistended; Bowel sounds present  EXTREMITIES:  2+ Peripheral Pulses, No clubbing, cyanosis, or edema; no clear phlebitis but pt resisting exam  PSYCH: calm  NEUROLOGY: no seizure activity   SKIN: No rashes or lesions    LABS:                        12.3   4.67  )-----------( 188      ( 25 Mar 2019 05:15 )             39.3     03-25    145  |  106  |  4<L>  ----------------------------<  100<H>  3.4<L>   |  29  |  0.44<L>    Ca    8.6      25 Mar 2019 05:15  Phos  4.0     03-25  Mg     1.8     03-25    TPro  6.0  /  Alb  2.9<L>  /  TBili  0.3  /  DBili  x   /  AST  60<H>  /  ALT  73<H>  /  AlkPhos  73  03-25      Valproic Acid Level, Serum (03.25.19 @ 05:15)    Valproic Acid Level, Serum: 70.4 ug/mL    Levetiracetam Level, Serum (03.25.19 @ 05:15)    Levetiracetam Level, Serum: 50.9: -------------------ADDITIONAL  INFORMATION-------------------  This test was developed and its performance characteristics  determined by [facility] in a manner consistent with CLIA  requirements. This test has not been cleared or approved by  the [place]. Food and Drug Administration.  Test Performed by:  [facility]  [address] [place] [address] 18480 mcg/mL        RADIOLOGY & ADDITIONAL TESTS:    Imaging Personally Reviewed:    Consultant(s) Notes Reviewed:      Care Discussed with Consultants/Other Providers: RN, SW, CM, ADS re overall care

## 2019-03-26 NOTE — PROGRESS NOTE ADULT - PROBLEM SELECTOR PLAN 5
retrying PO as noted above; decrease IVFs  mom has refused PEG in the past - left message for mother

## 2019-03-27 LAB
ALBUMIN SERPL ELPH-MCNC: 2.9 G/DL — LOW (ref 3.3–5)
ALP SERPL-CCNC: 79 U/L — SIGNIFICANT CHANGE UP (ref 40–120)
ALT FLD-CCNC: 61 U/L — HIGH (ref 4–33)
ANION GAP SERPL CALC-SCNC: 11 MMO/L — SIGNIFICANT CHANGE UP (ref 7–14)
AST SERPL-CCNC: 57 U/L — HIGH (ref 4–32)
BILIRUB DIRECT SERPL-MCNC: < 0.2 MG/DL — SIGNIFICANT CHANGE UP (ref 0.1–0.2)
BILIRUB SERPL-MCNC: 0.3 MG/DL — SIGNIFICANT CHANGE UP (ref 0.2–1.2)
BUN SERPL-MCNC: 6 MG/DL — LOW (ref 7–23)
CALCIT SERPL-MCNC: 2.9 PG/ML — SIGNIFICANT CHANGE UP
CALCIUM SERPL-MCNC: 8.6 MG/DL — SIGNIFICANT CHANGE UP (ref 8.4–10.5)
CHLORIDE SERPL-SCNC: 107 MMOL/L — SIGNIFICANT CHANGE UP (ref 98–107)
CO2 SERPL-SCNC: 28 MMOL/L — SIGNIFICANT CHANGE UP (ref 22–31)
CREAT SERPL-MCNC: 0.72 MG/DL — SIGNIFICANT CHANGE UP (ref 0.5–1.3)
GLUCOSE SERPL-MCNC: 82 MG/DL — SIGNIFICANT CHANGE UP (ref 70–99)
HCT VFR BLD CALC: 42.6 % — SIGNIFICANT CHANGE UP (ref 34.5–45)
HGB BLD-MCNC: 12.9 G/DL — SIGNIFICANT CHANGE UP (ref 11.5–15.5)
MAGNESIUM SERPL-MCNC: 1.8 MG/DL — SIGNIFICANT CHANGE UP (ref 1.6–2.6)
MCHC RBC-ENTMCNC: 30.3 % — LOW (ref 32–36)
MCHC RBC-ENTMCNC: 30.8 PG — SIGNIFICANT CHANGE UP (ref 27–34)
MCV RBC AUTO: 101.7 FL — HIGH (ref 80–100)
NRBC # FLD: 0 K/UL — SIGNIFICANT CHANGE UP (ref 0–0)
PHOSPHATE SERPL-MCNC: 3.9 MG/DL — SIGNIFICANT CHANGE UP (ref 2.5–4.5)
PLATELET # BLD AUTO: 186 K/UL — SIGNIFICANT CHANGE UP (ref 150–400)
PMV BLD: 11.2 FL — SIGNIFICANT CHANGE UP (ref 7–13)
POTASSIUM SERPL-MCNC: 4.4 MMOL/L — SIGNIFICANT CHANGE UP (ref 3.5–5.3)
POTASSIUM SERPL-SCNC: 4.4 MMOL/L — SIGNIFICANT CHANGE UP (ref 3.5–5.3)
PROT SERPL-MCNC: 6.3 G/DL — SIGNIFICANT CHANGE UP (ref 6–8.3)
RBC # BLD: 4.19 M/UL — SIGNIFICANT CHANGE UP (ref 3.8–5.2)
RBC # FLD: 13.7 % — SIGNIFICANT CHANGE UP (ref 10.3–14.5)
SODIUM SERPL-SCNC: 146 MMOL/L — HIGH (ref 135–145)
SPECIMEN SOURCE: SIGNIFICANT CHANGE UP
SPECIMEN SOURCE: SIGNIFICANT CHANGE UP
WBC # BLD: 5.61 K/UL — SIGNIFICANT CHANGE UP (ref 3.8–10.5)
WBC # FLD AUTO: 5.61 K/UL — SIGNIFICANT CHANGE UP (ref 3.8–10.5)

## 2019-03-27 PROCEDURE — 99233 SBSQ HOSP IP/OBS HIGH 50: CPT

## 2019-03-27 PROCEDURE — 99222 1ST HOSP IP/OBS MODERATE 55: CPT | Mod: GC

## 2019-03-27 RX ORDER — SODIUM CHLORIDE 9 MG/ML
500 INJECTION INTRAMUSCULAR; INTRAVENOUS; SUBCUTANEOUS ONCE
Refills: 0 | Status: COMPLETED | OUTPATIENT
Start: 2019-03-27 | End: 2019-03-27

## 2019-03-27 RX ORDER — SODIUM CHLORIDE 9 MG/ML
500 INJECTION, SOLUTION INTRAVENOUS ONCE
Refills: 0 | Status: COMPLETED | OUTPATIENT
Start: 2019-03-27 | End: 2019-03-27

## 2019-03-27 RX ORDER — SODIUM CHLORIDE 9 MG/ML
1000 INJECTION, SOLUTION INTRAVENOUS
Refills: 0 | Status: DISCONTINUED | OUTPATIENT
Start: 2019-03-27 | End: 2019-03-31

## 2019-03-27 RX ORDER — SODIUM CHLORIDE 9 MG/ML
250 INJECTION INTRAMUSCULAR; INTRAVENOUS; SUBCUTANEOUS ONCE
Refills: 0 | Status: COMPLETED | OUTPATIENT
Start: 2019-03-27 | End: 2019-03-27

## 2019-03-27 RX ADMIN — SODIUM CHLORIDE 500 MILLILITER(S): 9 INJECTION, SOLUTION INTRAVENOUS at 14:48

## 2019-03-27 RX ADMIN — PIPERACILLIN AND TAZOBACTAM 25 GRAM(S): 4; .5 INJECTION, POWDER, LYOPHILIZED, FOR SOLUTION INTRAVENOUS at 16:34

## 2019-03-27 RX ADMIN — Medication 1 SPRAY(S): at 17:30

## 2019-03-27 RX ADMIN — SODIUM CHLORIDE 500 MILLILITER(S): 9 INJECTION INTRAMUSCULAR; INTRAVENOUS; SUBCUTANEOUS at 16:33

## 2019-03-27 RX ADMIN — DIVALPROEX SODIUM 500 MILLIGRAM(S): 500 TABLET, DELAYED RELEASE ORAL at 07:19

## 2019-03-27 RX ADMIN — Medication 1 MILLIGRAM(S): at 08:59

## 2019-03-27 RX ADMIN — Medication 1 SPRAY(S): at 07:19

## 2019-03-27 RX ADMIN — SODIUM CHLORIDE 500 MILLILITER(S): 9 INJECTION INTRAMUSCULAR; INTRAVENOUS; SUBCUTANEOUS at 05:19

## 2019-03-27 RX ADMIN — Medication 1 MILLIGRAM(S): at 23:08

## 2019-03-27 RX ADMIN — DIVALPROEX SODIUM 500 MILLIGRAM(S): 500 TABLET, DELAYED RELEASE ORAL at 14:49

## 2019-03-27 RX ADMIN — Medication 1 DROP(S): at 07:21

## 2019-03-27 RX ADMIN — Medication 1 MILLIGRAM(S): at 14:49

## 2019-03-27 RX ADMIN — ENOXAPARIN SODIUM 40 MILLIGRAM(S): 100 INJECTION SUBCUTANEOUS at 07:22

## 2019-03-27 RX ADMIN — Medication 100 MILLIGRAM(S): at 23:03

## 2019-03-27 RX ADMIN — PIPERACILLIN AND TAZOBACTAM 25 GRAM(S): 4; .5 INJECTION, POWDER, LYOPHILIZED, FOR SOLUTION INTRAVENOUS at 07:21

## 2019-03-27 RX ADMIN — Medication 100 MILLIGRAM(S): at 07:19

## 2019-03-27 RX ADMIN — SODIUM CHLORIDE 500 MILLILITER(S): 9 INJECTION, SOLUTION INTRAVENOUS at 10:30

## 2019-03-27 RX ADMIN — LEVETIRACETAM 1000 MILLIGRAM(S): 250 TABLET, FILM COATED ORAL at 07:19

## 2019-03-27 RX ADMIN — DIVALPROEX SODIUM 500 MILLIGRAM(S): 500 TABLET, DELAYED RELEASE ORAL at 23:08

## 2019-03-27 RX ADMIN — OLANZAPINE 7.5 MILLIGRAM(S): 15 TABLET, FILM COATED ORAL at 23:08

## 2019-03-27 RX ADMIN — Medication 81 MILLIGRAM(S): at 14:51

## 2019-03-27 RX ADMIN — LEVETIRACETAM 1000 MILLIGRAM(S): 250 TABLET, FILM COATED ORAL at 14:49

## 2019-03-27 RX ADMIN — Medication 1 TABLET(S): at 14:49

## 2019-03-27 RX ADMIN — Medication 1 DROP(S): at 17:30

## 2019-03-27 RX ADMIN — LEVETIRACETAM 1000 MILLIGRAM(S): 250 TABLET, FILM COATED ORAL at 23:04

## 2019-03-27 RX ADMIN — SODIUM CHLORIDE 75 MILLILITER(S): 9 INJECTION, SOLUTION INTRAVENOUS at 23:03

## 2019-03-27 RX ADMIN — Medication 1 MILLIGRAM(S): at 14:51

## 2019-03-27 RX ADMIN — Medication 325 MILLIGRAM(S): at 14:49

## 2019-03-27 RX ADMIN — PIPERACILLIN AND TAZOBACTAM 25 GRAM(S): 4; .5 INJECTION, POWDER, LYOPHILIZED, FOR SOLUTION INTRAVENOUS at 23:08

## 2019-03-27 RX ADMIN — Medication 0.5 MILLIGRAM(S): at 08:41

## 2019-03-27 RX ADMIN — PANTOPRAZOLE SODIUM 40 MILLIGRAM(S): 20 TABLET, DELAYED RELEASE ORAL at 07:19

## 2019-03-27 NOTE — PROGRESS NOTE ADULT - SUBJECTIVE AND OBJECTIVE BOX
Patient is a 53y old  Female who presents with a chief complaint of seizures (26 Mar 2019 15:40)      SUBJECTIVE / OVERNIGHT EVENTS:  BP down to 90s over night, s/p 250cc bolus, then 500cc.  Pt at baseline mental status, pulls away arms when try to examine.  Staff been feeding when alert.    MEDICATIONS  (STANDING):  artificial  tears Solution 1 Drop(s) Both EYES two times a day  aspirin  chewable 81 milliGRAM(s) Oral daily  buDESOnide   0.5 milliGRAM(s) Respule 0.5 milliGRAM(s) Inhalation daily  dextrose 5% + lactated ringers. 1000 milliLiter(s) (75 mL/Hr) IV Continuous <Continuous>  diVALproex  milliGRAM(s) Oral every 8 hours  docusate sodium 100 milliGRAM(s) Oral three times a day  enoxaparin Injectable 40 milliGRAM(s) SubCutaneous every 24 hours  ferrous    sulfate 325 milliGRAM(s) Oral daily  fluticasone propionate 50 MICROgram(s)/spray Nasal Spray 1 Spray(s) Both Nostrils two times a day  folic acid 1 milliGRAM(s) Oral daily  lactated ringers Bolus 500 milliLiter(s) IV Bolus once  levETIRAcetam 1000 milliGRAM(s) Oral <User Schedule>  LORazepam     Tablet 1 milliGRAM(s) Oral every 8 hours  multivitamin 1 Tablet(s) Oral daily  OLANZapine 7.5 milliGRAM(s) Oral at bedtime  pantoprazole    Tablet 40 milliGRAM(s) Oral before breakfast  piperacillin/tazobactam IVPB. 3.375 Gram(s) IV Intermittent every 8 hours    MEDICATIONS  (PRN):  LORazepam   Injectable 1 milliGRAM(s) IntraMuscular every 6 hours PRN seizure      CAPILLARY BLOOD GLUCOSE          I&O's Summary    26 Mar 2019 07:01  -  27 Mar 2019 07:00  --------------------------------------------------------  IN: 50 mL / OUT: 0 mL / NET: 50 mL        Vital Signs Last 24 Hrs  T(C): 34.9 (27 Mar 2019 12:25), Max: 37 (27 Mar 2019 00:45)  T(F): 94.8 (27 Mar 2019 12:25), Max: 98.6 (27 Mar 2019 00:45)  HR: 68 (27 Mar 2019 12:25) (68 - 97)  BP: 102/59 (27 Mar 2019 12:25) (90/52 - 138/93)  BP(mean): --  RR: 16 (27 Mar 2019 12:25) (16 - 18)  SpO2: 95% (27 Mar 2019 12:25) (95% - 100%)    PHYSICAL EXAM:  GENERAL: middle aged female, MR, alert, resists examine  HEAD:  Atraumatic, Normocephalic  EYES: EOMI, PERRLA, conjunctiva and sclera clear  NECK: Supple, No JVD  CHEST/LUNG: Clear to auscultation bilaterally; No wheeze  HEART: Regular rate and rhythm; No murmurs, rubs, or gallops  ABDOMEN: Soft, Nontender, Nondistended; Bowel sounds present  EXTREMITIES:  2+ Peripheral Pulses, No clubbing, cyanosis, or edema; no clear phlebitis but pt resisting exam  PSYCH: calm  NEUROLOGY: no seizure activity   SKIN: No rashes or lesions    LABS:                        12.9   5.61  )-----------( 186      ( 27 Mar 2019 06:13 )             42.6         146<H>  |  107  |  6<L>  ----------------------------<  82  4.4   |  28  |  0.72    Ca    8.6      27 Mar 2019 06:13  Phos  3.9       Mg     1.8         Urinalysis Basic - ( 26 Mar 2019 19:00 )    Color: LIGHT YELLOW / Appearance: CLEAR / S.013 / pH: 6.5  Gluc: NEGATIVE / Ketone: NEGATIVE  / Bili: NEGATIVE / Urobili: NORMAL   Blood: NEGATIVE / Protein: NEGATIVE / Nitrite: NEGATIVE   Leuk Esterase: NEGATIVE / RBC: x / WBC x   Sq Epi: x / Non Sq Epi: x / Bacteria: x    < from: Xray Chest 1 View- PORTABLE-Urgent (19 @ 13:57) >  INTERPRETATION:  TIME OF EXAM: 2019 at 1:50 PM.    CLINICAL INFORMATION: Hypothermia. Evaluate for pneumonia.    COMPARISON: [facility] chest x-ray from  2019. CT scan of the chest   from 2019.    TECHNIQUE:   AP Portable chest x-ray. The chin obscures the superior   mediastinum and portions of the upper lungs.    INTERPRETATION:     Heart size and the mediastinum cannot be accurately evaluated on this   projection.  There are low lung volumes.  The included right lung is clear.  There is continued hazy opacity in the periphery of the left mid to lower   lung without obscuration of the left hemidiaphragm. This is unchanged   from the  image of the CT scan which demonstrated prominent   epicardial fat in this region. The remainder of the visualized left lung   is clear.  No pleural effusion is seen. There is osteoarthritic degenerative change   of the spine.              IMPRESSION:  Portions of the upper lungs obscured by overlying chin.   Visualized lungs show no focal lung consolidation.    RADIOLOGY & ADDITIONAL TESTS:    Imaging Personally Reviewed:    Consultant(s) Notes Reviewed:      Care Discussed with Consultants/Other Providers:  RN, SW, CM, ADS re overall care

## 2019-03-27 NOTE — PROGRESS NOTE ADULT - ATTENDING COMMENTS
d/w [facility]  Lisbethkarly Barlow [phone] --> d/w mother Brianna Ponce [phone] - reviewed overall including better control of seizures, changing to oral meds and then new hypothermia.  Mother made decision in past for no feeding tube.

## 2019-03-27 NOTE — CONSULT NOTE ADULT - SUBJECTIVE AND OBJECTIVE BOX
INFECTIOUS DISEASES CONSULT NOTE    Patient is a 53y old  Female who presents with a chief complaint of seizures (27 Mar 2019 13:21)    HPI:  53F h/o seizure disorder, cerebral palsy initially adm 3/19 for worsening seizure activity.  ID is being consulted for hypothermia and hypotension.    The patient was seen by Neuro, who changed her AED regimen.  On 3/26, the patient was noted to be hypothermic and was empirically initiated on pip-tazo for possible aspiration.      PAST MEDICAL & SURGICAL HISTORY:  Intellectual disability  Constipation  Seizure disorder  Cerebral palsy  No significant past surgical history      SOCIAL: no EtOH, no drug use, no smoking; bedbound, completely dependent ADLs    FAMILY HISTORY:  No pertinent family history in first degree relatives        REVIEW OF SYSTEMS:      Allergies    Topamax (Unknown)    Intolerances        ANTIMICROBIALS:    piperacillin/tazobactam IVPB. 3.375 Gram(s) IV Intermittent every 8 hours    piperacillin/tazobactam IVPB. 3.375 Gram(s) IV Intermittent every 8 hours        PHYSICAL EXAM    Vital Signs Last 24 Hrs  T(C): 34.9 (27 Mar 2019 12:25), Max: 37 (27 Mar 2019 00:45)  T(F): 94.8 (27 Mar 2019 12:25), Max: 98.6 (27 Mar 2019 00:45)  HR: 68 (27 Mar 2019 12:25) (68 - 97)  BP: 102/59 (27 Mar 2019 12:25) (90/52 - 138/93)  BP(mean): --  RR: 16 (27 Mar 2019 12:25) (16 - 18)  SpO2: 95% (27 Mar 2019 12:25) (95% - 100%)    Gen:  HENT:  Lymph:  Eye:  CV:  Pulm:  Abd:  Skin:  Ext:  Neuro:  Psych:                          12.9   5.61  )-----------( 186      ( 27 Mar 2019 06:13 )             42.6       03-27    146<H>  |  107  |  6<L>  ----------------------------<  82  4.4   |  28  |  0.72    Ca    8.6      27 Mar 2019 06:13  Phos  3.9     03-27  Mg     1.8     03-27    TPro  6.3  /  Alb  2.9<L>  /  TBili  0.3  /  DBili  < 0.2  /  AST  57<H>  /  ALT  61<H>  /  AlkPhos  79  03-27      RECENT CULTURES:      RADIOLOGY: INFECTIOUS DISEASES CONSULT NOTE    Patient is a 53y old  Female who presents with a chief complaint of seizures (27 Mar 2019 13:21)    HPI:  53F h/o seizure disorder, cerebral palsy initially adm 3/19 for worsening seizure activity.  ID is being consulted for hypothermia and hypotension.    The patient was seen by Neuro, who changed her AED regimen.  On 3/26, the patient was noted to be hypothermic and was empirically initiated on pip-tazo for possible aspiration.      PAST MEDICAL & SURGICAL HISTORY:  Intellectual disability  Constipation  Seizure disorder  Cerebral palsy  No significant past surgical history      SOCIAL: per chart review: no EtOH, no drug use, no smoking; bedbound, completely dependent ADLs    FAMILY HISTORY:  No pertinent family history in first degree relatives        REVIEW OF SYSTEMS:  unable to assess, non-verbal    Allergies    Topamax (Unknown)    Intolerances        ANTIMICROBIALS:    piperacillin/tazobactam IVPB. 3.375 Gram(s) IV Intermittent every 8 hours    piperacillin/tazobactam IVPB. 3.375 Gram(s) IV Intermittent every 8 hours        PHYSICAL EXAM  T(C): 34.9 (03-27-19 @ 12:25), Max: 37 (03-27-19 @ 00:45)  HR: 68 (03-27-19 @ 12:25) (68 - 97)  BP: 102/59 (03-27-19 @ 12:25) (90/52 - 138/93)  RR: 16 (03-27-19 @ 12:25) (16 - 18)  SpO2: 95% (03-27-19 @ 12:25) (95% - 100%)    Gen: sleeping comfortably, arousable  HENT: neck soft / supple  Lymph: no LAD noted in neck  Eye: eyes closed  CV: normal rate, regular rhythm  Pulm: CTAB anteriorly but limited 2/2 pt effort  Abd: +BS, soft, NT, ND  Skin: warm, dry  Ext: no LE edema  Neuro: contracted; sleeping, arousable  Psych: unable to asses                          12.9   5.61  )-----------( 186      ( 27 Mar 2019 06:13 )             42.6       03-27    146<H>  |  107  |  6<L>  ----------------------------<  82  4.4   |  28  |  0.72    Ca    8.6      27 Mar 2019 06:13  Phos  3.9     03-27  Mg     1.8     03-27    TPro  6.3  /  Alb  2.9<L>  /  TBili  0.3  /  DBili  < 0.2  /  AST  57<H>  /  ALT  61<H>  /  AlkPhos  79  03-27      RECENT CULTURES:      RADIOLOGY: INFECTIOUS DISEASES CONSULT NOTE    Patient is a 53y old  Female who presents with a chief complaint of seizures (27 Mar 2019 13:21)    HPI: 53F h/o seizure disorder, cerebral palsy initially adm 3/19 for worsening seizure activity.  ID is being consulted for hypothermia and hypotension.    The patient was seen by Neuro, who changed her AED regimen.  On 3/26, the patient was noted to be hypothermic and was empirically initiated on pip-tazo for possible aspiration. At time of eval, patient poorly verbal, not able to interact with caregiver. AMS. Does not localize any specific pains or concerns. ID called for further eval, hypothermia, AMS    PAST MEDICAL & SURGICAL HISTORY:  Intellectual disability  Constipation  Seizure disorder  Cerebral palsy  No significant past surgical history    SOCIAL: per chart review: no EtOH, no drug use, no smoking; bedbound, completely dependent ADLs    FAMILY HISTORY:  No pertinent family history in first degree relatives    REVIEW OF SYSTEMS:  unable to assess, non-verbal    Allergies    Topamax (Unknown)    ANTIMICROBIALS:    piperacillin/tazobactam IVPB. 3.375 Gram(s) IV Intermittent every 8 hours    PHYSICAL EXAM  T(C): 34.9 (03-27-19 @ 12:25), Max: 37 (03-27-19 @ 00:45)  HR: 68 (03-27-19 @ 12:25) (68 - 97)  BP: 102/59 (03-27-19 @ 12:25) (90/52 - 138/93)  RR: 16 (03-27-19 @ 12:25) (16 - 18)  SpO2: 95% (03-27-19 @ 12:25) (95% - 100%)    Gen: sleeping comfortably, arousable  HENT: neck soft / supple  Lymph: no LAD noted in neck  Eye: eyes closed  CV: normal rate, regular rhythm  Pulm: CTAB anteriorly but limited 2/2 pt effort  Abd: +BS, soft, NT, ND  Skin: warm, dry  Ext: no LE edema  Neuro: contracted; sleeping, arousable  Psych: unable to assess    Labs:                        12.9   5.61  )-----------( 186      ( 27 Mar 2019 06:13 )             42.6     03-27    146<H>  |  107  |  6<L>  ----------------------------<  82  4.4   |  28  |  0.72    Ca    8.6      27 Mar 2019 06:13  Phos  3.9     03-27  Mg     1.8     03-27    TPro  6.3  /  Alb  2.9<L>  /  TBili  0.3  /  DBili  < 0.2  /  AST  57<H>  /  ALT  61<H>  /  AlkPhos  79  03-27    RECENT CULTURES:    3/26 BCX NGTD    RADIOLOGY:    3/26 XR:    INTERPRETATION:     Heart size and the mediastinum cannot be accurately evaluated on this   projection.  There are low lung volumes.  The included right lung is clear.  There is continued hazy opacity in the periphery of the left mid to lower   lung without obscuration of the left hemidiaphragm. This is unchanged   from the  image of the CT scan which demonstrated prominent   epicardial fat in this region. The remainder of the visualized left lung   is clear.  No pleural effusion is seen. There is osteoarthritic degenerative change   of the spine.    IMPRESSION:  Portions of the upper lungs obscured by overlying chin.   Visualized lungs show no focal lung consolidation.

## 2019-03-27 NOTE — CONSULT NOTE ADULT - ATTENDING COMMENTS
53 F h/o seizure disorder, cerebral palsy initially adm 3/19 for worsening seizure activity.  During hospital stay, patient developed worsening mental status, hypothermia  Appears lethargic  Mild leukopenia  LFTs slightly elevated  Otherwise not able to provide further focal history  Aspiration vs occult biliary process?  Overall, Hypothermia, AMS, cerebral palsy, seizure, ? aspiration  - Zosyn 3.375g q 8  - F/U pending culture  - Trend WBC, temperature curve  - If worsening LFTs or nonimproving--consider CT A/P vs RUQ USG  - Discussed with medicine attending    Carter Sparrow MD  Pager [phone]  After 5pm and on weekends call [phone]    I was physically present for the key portions of the evaluation and management service provided. I saw and examined the patient. I agree with the above history, physical, and plan except for any discrepancies which I have documented in “Attending Attestation.” Please refer to “Attending Attestation” for final plan.
Patient seen and examined with neurology team and above note reviewed and I agree with assessment and plan as outlined. Patient with hx of CP and developmental delay and seizure disorder presenting with increased seizures occurring every 10 mins or so.  No signs of active infection ascertained and will proceed with increasing keppra and continuing vimpat and will obtain overnight EEG to assess for change in electrical activity.   Seizure precautions and continue medical management and supportive care. May use ativan 1 mg IV for seizures lasting more than 2-3 mins.

## 2019-03-27 NOTE — PROGRESS NOTE ADULT - PROBLEM SELECTOR PLAN 1
infection w/u - CXR no clear change but did have significant seizures recently and at high risk for aspiration, blood cultures, UA neg, old IV sites no clear phlebitis, no diarrhea, lactate 0.9 yesterday, started Zosyn yesterday for possible aspiration;  noted last admission elevated LFTs, treated for possible acute celeste - LFTs trended down to normal, added on today mildly elevated but not increasing  - will ask ID input, ?CT

## 2019-03-27 NOTE — CONSULT NOTE ADULT - ASSESSMENT
53F h/o seizure disorder, cerebral palsy initially adm 3/19 for worsening seizure activity.  ID is being consulted for hypothermia and hypotension.

## 2019-03-28 LAB
ALBUMIN SERPL ELPH-MCNC: 2.8 G/DL — LOW (ref 3.3–5)
ALP SERPL-CCNC: 67 U/L — SIGNIFICANT CHANGE UP (ref 40–120)
ALT FLD-CCNC: 52 U/L — HIGH (ref 4–33)
ANION GAP SERPL CALC-SCNC: 8 MMO/L — SIGNIFICANT CHANGE UP (ref 7–14)
AST SERPL-CCNC: 41 U/L — HIGH (ref 4–32)
BASOPHILS # BLD AUTO: 0.03 K/UL — SIGNIFICANT CHANGE UP (ref 0–0.2)
BASOPHILS NFR BLD AUTO: 0.7 % — SIGNIFICANT CHANGE UP (ref 0–2)
BILIRUB SERPL-MCNC: 0.3 MG/DL — SIGNIFICANT CHANGE UP (ref 0.2–1.2)
BUN SERPL-MCNC: 5 MG/DL — LOW (ref 7–23)
CALCIUM SERPL-MCNC: 8.6 MG/DL — SIGNIFICANT CHANGE UP (ref 8.4–10.5)
CHLORIDE SERPL-SCNC: 109 MMOL/L — HIGH (ref 98–107)
CO2 SERPL-SCNC: 29 MMOL/L — SIGNIFICANT CHANGE UP (ref 22–31)
CREAT SERPL-MCNC: 0.68 MG/DL — SIGNIFICANT CHANGE UP (ref 0.5–1.3)
EOSINOPHIL # BLD AUTO: 0.34 K/UL — SIGNIFICANT CHANGE UP (ref 0–0.5)
EOSINOPHIL NFR BLD AUTO: 8.2 % — HIGH (ref 0–6)
GLUCOSE SERPL-MCNC: 79 MG/DL — SIGNIFICANT CHANGE UP (ref 70–99)
HCT VFR BLD CALC: 39.5 % — SIGNIFICANT CHANGE UP (ref 34.5–45)
HGB BLD-MCNC: 12.3 G/DL — SIGNIFICANT CHANGE UP (ref 11.5–15.5)
IMM GRANULOCYTES NFR BLD AUTO: 1.7 % — HIGH (ref 0–1.5)
LYMPHOCYTES # BLD AUTO: 1.78 K/UL — SIGNIFICANT CHANGE UP (ref 1–3.3)
LYMPHOCYTES # BLD AUTO: 42.8 % — SIGNIFICANT CHANGE UP (ref 13–44)
MAGNESIUM SERPL-MCNC: 1.8 MG/DL — SIGNIFICANT CHANGE UP (ref 1.6–2.6)
MCHC RBC-ENTMCNC: 30.9 PG — SIGNIFICANT CHANGE UP (ref 27–34)
MCHC RBC-ENTMCNC: 31.1 % — LOW (ref 32–36)
MCV RBC AUTO: 99.2 FL — SIGNIFICANT CHANGE UP (ref 80–100)
MONOCYTES # BLD AUTO: 0.52 K/UL — SIGNIFICANT CHANGE UP (ref 0–0.9)
MONOCYTES NFR BLD AUTO: 12.5 % — SIGNIFICANT CHANGE UP (ref 2–14)
NEUTROPHILS # BLD AUTO: 1.42 K/UL — LOW (ref 1.8–7.4)
NEUTROPHILS NFR BLD AUTO: 34.1 % — LOW (ref 43–77)
NRBC # FLD: 0 K/UL — SIGNIFICANT CHANGE UP (ref 0–0)
PHOSPHATE SERPL-MCNC: 3.1 MG/DL — SIGNIFICANT CHANGE UP (ref 2.5–4.5)
PLATELET # BLD AUTO: 165 K/UL — SIGNIFICANT CHANGE UP (ref 150–400)
PMV BLD: 11.3 FL — SIGNIFICANT CHANGE UP (ref 7–13)
POTASSIUM SERPL-MCNC: 4 MMOL/L — SIGNIFICANT CHANGE UP (ref 3.5–5.3)
POTASSIUM SERPL-SCNC: 4 MMOL/L — SIGNIFICANT CHANGE UP (ref 3.5–5.3)
PROCALCITONIN SERPL-MCNC: 0.06 NG/ML — SIGNIFICANT CHANGE UP (ref 0.02–0.1)
PROT SERPL-MCNC: 5.9 G/DL — LOW (ref 6–8.3)
RBC # BLD: 3.98 M/UL — SIGNIFICANT CHANGE UP (ref 3.8–5.2)
RBC # FLD: 13.6 % — SIGNIFICANT CHANGE UP (ref 10.3–14.5)
SODIUM SERPL-SCNC: 146 MMOL/L — HIGH (ref 135–145)
WBC # BLD: 4.16 K/UL — SIGNIFICANT CHANGE UP (ref 3.8–10.5)
WBC # FLD AUTO: 4.16 K/UL — SIGNIFICANT CHANGE UP (ref 3.8–10.5)

## 2019-03-28 PROCEDURE — 99232 SBSQ HOSP IP/OBS MODERATE 35: CPT

## 2019-03-28 PROCEDURE — 99233 SBSQ HOSP IP/OBS HIGH 50: CPT

## 2019-03-28 RX ADMIN — Medication 1 MILLIGRAM(S): at 13:21

## 2019-03-28 RX ADMIN — PIPERACILLIN AND TAZOBACTAM 25 GRAM(S): 4; .5 INJECTION, POWDER, LYOPHILIZED, FOR SOLUTION INTRAVENOUS at 13:21

## 2019-03-28 RX ADMIN — Medication 1 MILLIGRAM(S): at 05:31

## 2019-03-28 RX ADMIN — Medication 1 MILLIGRAM(S): at 21:42

## 2019-03-28 RX ADMIN — DIVALPROEX SODIUM 500 MILLIGRAM(S): 500 TABLET, DELAYED RELEASE ORAL at 21:42

## 2019-03-28 RX ADMIN — SODIUM CHLORIDE 75 MILLILITER(S): 9 INJECTION, SOLUTION INTRAVENOUS at 21:28

## 2019-03-28 RX ADMIN — DIVALPROEX SODIUM 500 MILLIGRAM(S): 500 TABLET, DELAYED RELEASE ORAL at 05:36

## 2019-03-28 RX ADMIN — Medication 1 SPRAY(S): at 05:31

## 2019-03-28 RX ADMIN — LEVETIRACETAM 1000 MILLIGRAM(S): 250 TABLET, FILM COATED ORAL at 05:31

## 2019-03-28 RX ADMIN — Medication 100 MILLIGRAM(S): at 05:31

## 2019-03-28 RX ADMIN — PANTOPRAZOLE SODIUM 40 MILLIGRAM(S): 20 TABLET, DELAYED RELEASE ORAL at 05:36

## 2019-03-28 RX ADMIN — Medication 1 SPRAY(S): at 18:42

## 2019-03-28 RX ADMIN — OLANZAPINE 7.5 MILLIGRAM(S): 15 TABLET, FILM COATED ORAL at 21:42

## 2019-03-28 RX ADMIN — Medication 100 MILLIGRAM(S): at 13:16

## 2019-03-28 RX ADMIN — Medication 1 DROP(S): at 18:43

## 2019-03-28 RX ADMIN — PIPERACILLIN AND TAZOBACTAM 25 GRAM(S): 4; .5 INJECTION, POWDER, LYOPHILIZED, FOR SOLUTION INTRAVENOUS at 21:42

## 2019-03-28 RX ADMIN — Medication 1 MILLIGRAM(S): at 13:16

## 2019-03-28 RX ADMIN — LEVETIRACETAM 1000 MILLIGRAM(S): 250 TABLET, FILM COATED ORAL at 13:15

## 2019-03-28 RX ADMIN — Medication 325 MILLIGRAM(S): at 13:15

## 2019-03-28 RX ADMIN — Medication 1 DROP(S): at 05:31

## 2019-03-28 RX ADMIN — Medication 1 TABLET(S): at 13:15

## 2019-03-28 RX ADMIN — Medication 81 MILLIGRAM(S): at 13:14

## 2019-03-28 RX ADMIN — DIVALPROEX SODIUM 500 MILLIGRAM(S): 500 TABLET, DELAYED RELEASE ORAL at 13:22

## 2019-03-28 RX ADMIN — LEVETIRACETAM 1000 MILLIGRAM(S): 250 TABLET, FILM COATED ORAL at 21:42

## 2019-03-28 RX ADMIN — PIPERACILLIN AND TAZOBACTAM 25 GRAM(S): 4; .5 INJECTION, POWDER, LYOPHILIZED, FOR SOLUTION INTRAVENOUS at 05:31

## 2019-03-28 RX ADMIN — SODIUM CHLORIDE 75 MILLILITER(S): 9 INJECTION, SOLUTION INTRAVENOUS at 05:30

## 2019-03-28 RX ADMIN — SODIUM CHLORIDE 75 MILLILITER(S): 9 INJECTION, SOLUTION INTRAVENOUS at 15:56

## 2019-03-28 NOTE — PROGRESS NOTE ADULT - PROBLEM SELECTOR PLAN 1
infection w/u - CXR no clear change but did have significant seizures recently and at high risk for aspiration, blood cultures, UA neg, old IV sites no clear phlebitis, no diarrhea;  last admission elevated LFTs, treated for possible acute celeste, LFTs trended down to normal, this admission mildly elevated but trending down  - appreciate ID input - c/w zosyn for possible aspiration

## 2019-03-28 NOTE — PROGRESS NOTE ADULT - SUBJECTIVE AND OBJECTIVE BOX
Patient is a 53y old  Female who presents with a chief complaint of seizures (27 Mar 2019 13:49)      SUBJECTIVE / OVERNIGHT EVENTS:  Pt seen earlier, no problems reported.  Agitated when drawing labs, later sleepy.    MEDICATIONS  (STANDING):  artificial  tears Solution 1 Drop(s) Both EYES two times a day  aspirin  chewable 81 milliGRAM(s) Oral daily  buDESOnide   0.5 milliGRAM(s) Respule 0.5 milliGRAM(s) Inhalation daily  dextrose 5% + lactated ringers. 1000 milliLiter(s) (75 mL/Hr) IV Continuous <Continuous>  diVALproex  milliGRAM(s) Oral every 8 hours  docusate sodium 100 milliGRAM(s) Oral three times a day  enoxaparin Injectable 40 milliGRAM(s) SubCutaneous every 24 hours  ferrous    sulfate 325 milliGRAM(s) Oral daily  fluticasone propionate 50 MICROgram(s)/spray Nasal Spray 1 Spray(s) Both Nostrils two times a day  folic acid 1 milliGRAM(s) Oral daily  levETIRAcetam 1000 milliGRAM(s) Oral <User Schedule>  LORazepam     Tablet 1 milliGRAM(s) Oral every 8 hours  multivitamin 1 Tablet(s) Oral daily  OLANZapine 7.5 milliGRAM(s) Oral at bedtime  pantoprazole    Tablet 40 milliGRAM(s) Oral before breakfast  piperacillin/tazobactam IVPB. 3.375 Gram(s) IV Intermittent every 8 hours    MEDICATIONS  (PRN):  LORazepam   Injectable 1 milliGRAM(s) IntraMuscular every 6 hours PRN seizure      CAPILLARY BLOOD GLUCOSE          I&O's Summary      Vital Signs Last 24 Hrs  T(C): 36.2 (28 Mar 2019 10:00), Max: 36.9 (27 Mar 2019 21:21)  T(F): 97.2 (28 Mar 2019 10:00), Max: 98.4 (27 Mar 2019 21:21)  HR: 74 (28 Mar 2019 10:00) (74 - 88)  BP: 106/76 (28 Mar 2019 10:00) (106/76 - 145/88)  BP(mean): --  RR: 16 (28 Mar 2019 10:00) (16 - 18)  SpO2: 100% (28 Mar 2019 10:00) (95% - 100%)    PHYSICAL EXAM:  GENERAL: middle aged female, MR, resting comfortably  HEAD:  Atraumatic, Normocephalic  EYES: EOMI, PERRLA, conjunctiva and sclera clear  NECK: Supple, No JVD  CHEST/LUNG: Clear to auscultation bilaterally; No wheeze  HEART: Regular rate and rhythm; No murmurs, rubs, or gallops  ABDOMEN: Soft, Nontender, Nondistended; Bowel sounds present  EXTREMITIES:  2+ Peripheral Pulses, No clubbing, cyanosis, or edema; no clear phlebitis  PSYCH: calm  NEUROLOGY: no seizure activity   SKIN: No rashes or lesions    LABS:                        12.3   4.16  )-----------( 165      ( 28 Mar 2019 08:50 )             39.5     -    146<H>  |  109<H>  |  5<L>  ----------------------------<  79  4.0   |  29  |  0.68    Ca    8.6      28 Mar 2019 08:50  Phos  3.1       Mg     1.8         TPro  5.9<L>  /  Alb  2.8<L>  /  TBili  0.3  /  DBili  x   /  AST  41<H>  /  ALT  52<H>  /  AlkPhos  67            Urinalysis Basic - ( 26 Mar 2019 19:00 )    Color: LIGHT YELLOW / Appearance: CLEAR / S.013 / pH: 6.5  Gluc: NEGATIVE / Ketone: NEGATIVE  / Bili: NEGATIVE / Urobili: NORMAL   Blood: NEGATIVE / Protein: NEGATIVE / Nitrite: NEGATIVE   Leuk Esterase: NEGATIVE / RBC: x / WBC x   Sq Epi: x / Non Sq Epi: x / Bacteria: x        RADIOLOGY & ADDITIONAL TESTS:    Imaging Personally Reviewed:    Consultant(s) Notes Reviewed:      Care Discussed with Consultants/Other Providers: RN, SW, CM, ADS re overall care; ID re infection Patient is a 53y old  Female who presents with a chief complaint of seizures (27 Mar 2019 13:49)      SUBJECTIVE / OVERNIGHT EVENTS:  Pt seen earlier, no problems reported.  Agitated when drawing labs, later sleepy.  RN reports she took her meds without problem.  Mother came this afternoon, helped feed patient.    MEDICATIONS  (STANDING):  artificial  tears Solution 1 Drop(s) Both EYES two times a day  aspirin  chewable 81 milliGRAM(s) Oral daily  buDESOnide   0.5 milliGRAM(s) Respule 0.5 milliGRAM(s) Inhalation daily  dextrose 5% + lactated ringers. 1000 milliLiter(s) (75 mL/Hr) IV Continuous <Continuous>  diVALproex  milliGRAM(s) Oral every 8 hours  docusate sodium 100 milliGRAM(s) Oral three times a day  enoxaparin Injectable 40 milliGRAM(s) SubCutaneous every 24 hours  ferrous    sulfate 325 milliGRAM(s) Oral daily  fluticasone propionate 50 MICROgram(s)/spray Nasal Spray 1 Spray(s) Both Nostrils two times a day  folic acid 1 milliGRAM(s) Oral daily  levETIRAcetam 1000 milliGRAM(s) Oral <User Schedule>  LORazepam     Tablet 1 milliGRAM(s) Oral every 8 hours  multivitamin 1 Tablet(s) Oral daily  OLANZapine 7.5 milliGRAM(s) Oral at bedtime  pantoprazole    Tablet 40 milliGRAM(s) Oral before breakfast  piperacillin/tazobactam IVPB. 3.375 Gram(s) IV Intermittent every 8 hours    MEDICATIONS  (PRN):  LORazepam   Injectable 1 milliGRAM(s) IntraMuscular every 6 hours PRN seizure      CAPILLARY BLOOD GLUCOSE          I&O's Summary      Vital Signs Last 24 Hrs  T(C): 36.2 (28 Mar 2019 10:00), Max: 36.9 (27 Mar 2019 21:21)  T(F): 97.2 (28 Mar 2019 10:00), Max: 98.4 (27 Mar 2019 21:21)  HR: 74 (28 Mar 2019 10:00) (74 - 88)  BP: 106/76 (28 Mar 2019 10:00) (106/76 - 145/88)  BP(mean): --  RR: 16 (28 Mar 2019 10:00) (16 - 18)  SpO2: 100% (28 Mar 2019 10:00) (95% - 100%)    PHYSICAL EXAM:  GENERAL: middle aged female, MR, resting comfortably  HEAD:  Atraumatic, Normocephalic  EYES: EOMI, PERRLA, conjunctiva and sclera clear  NECK: Supple, No JVD  CHEST/LUNG: Clear to auscultation bilaterally; No wheeze  HEART: Regular rate and rhythm; No murmurs, rubs, or gallops  ABDOMEN: Soft, Nontender, Nondistended; Bowel sounds present  EXTREMITIES:  2+ Peripheral Pulses, No clubbing, cyanosis, or edema; no clear phlebitis  PSYCH: calm  NEUROLOGY: no seizure activity   SKIN: No rashes or lesions    LABS:                        12.3   4.16  )-----------( 165      ( 28 Mar 2019 08:50 )             39.5         146<H>  |  109<H>  |  5<L>  ----------------------------<  79  4.0   |  29  |  0.68    Ca    8.6      28 Mar 2019 08:50  Phos  3.1       Mg     1.8         TPro  5.9<L>  /  Alb  2.8<L>  /  TBili  0.3  /  DBili  x   /  AST  41<H>  /  ALT  52<H>  /  AlkPhos  67            Urinalysis Basic - ( 26 Mar 2019 19:00 )    Color: LIGHT YELLOW / Appearance: CLEAR / S.013 / pH: 6.5  Gluc: NEGATIVE / Ketone: NEGATIVE  / Bili: NEGATIVE / Urobili: NORMAL   Blood: NEGATIVE / Protein: NEGATIVE / Nitrite: NEGATIVE   Leuk Esterase: NEGATIVE / RBC: x / WBC x   Sq Epi: x / Non Sq Epi: x / Bacteria: x        RADIOLOGY & ADDITIONAL TESTS:    Imaging Personally Reviewed:    Consultant(s) Notes Reviewed:      Care Discussed with Consultants/Other Providers: RN, SW, CM, ADS re overall care; ID re infection

## 2019-03-28 NOTE — PROGRESS NOTE ADULT - ATTENDING COMMENTS
d/w [facility]  Lisbethkarly Barlow [phone] --> d/w mother Brianna Ponce [phone] - reviewed overall including better control of seizures, changing to oral meds and then new hypothermia.  Mother made decision in past for no feeding tube.

## 2019-03-28 NOTE — PROGRESS NOTE ADULT - ASSESSMENT
53F CP nonverbal at baseline, seizure disorder admitted for increased seizures, became hypothermic 3/26, treated for likely aspiration pneumonia

## 2019-03-28 NOTE — PROGRESS NOTE ADULT - ASSESSMENT
53 F h/o seizure disorder, cerebral palsy initially adm 3/19 for worsening seizure activity.  During hospital stay, patient developed worsening mental status, hypothermia  Appears lethargic  Mild leukopenia  LFTs slightly elevated  Otherwise not able to provide further focal history  Aspiration vs occult biliary process?  Mental status improving today, hypothermia improving  Overall, Hypothermia, AMS, cerebral palsy, seizure, ? aspiration  - Zosyn 3.375g q 8  - F/U pending cultures  - Trend WBC, temperature curve  - If worsening LFTs or nonimproving--consider CT A/P vs RUQ USG    Carter Sparrow MD  Pager [phone]  After 5pm and on weekends call [phone]

## 2019-03-28 NOTE — PROGRESS NOTE ADULT - SUBJECTIVE AND OBJECTIVE BOX
CC: F/U for ? aspiration    Saw/spoke to patient. Patient more awake today. No new complaints, but poorly verbal. No other new events.    Allergies  Topamax (Unknown)    ANTIMICROBIALS:  piperacillin/tazobactam IVPB. 3.375 every 8 hours    PE:    Vital Signs Last 24 Hrs  T(C): 36.2 (28 Mar 2019 10:00), Max: 36.9 (27 Mar 2019 21:21)  T(F): 97.2 (28 Mar 2019 10:00), Max: 98.4 (27 Mar 2019 21:21)  HR: 74 (28 Mar 2019 10:00) (74 - 88)  BP: 106/76 (28 Mar 2019 10:00) (106/76 - 145/88)  RR: 16 (28 Mar 2019 10:00) (16 - 18)  SpO2: 100% (28 Mar 2019 10:00) (95% - 100%)    Gen: AOx0-1, opens eyes spontanously, still nonverbal, more awake  CV: S1+S2 normal, nontachycardic  Resp: Clear bilat, no resp distress, no crackles/wheezes  Abd: Soft, nontender, +BS  Ext: No LE edema, no wounds    LABS:                        12.3   4.16  )-----------( 165      ( 28 Mar 2019 08:50 )             39.5     -    146<H>  |  109<H>  |  5<L>  ----------------------------<  79  4.0   |  29  |  0.68    Ca    8.6      28 Mar 2019 08:50  Phos  3.1       Mg     1.8         TPro  5.9<L>  /  Alb  2.8<L>  /  TBili  0.3  /  DBili  x   /  AST  41<H>  /  ALT  52<H>  /  AlkPhos  67  28    Urinalysis Basic - ( 26 Mar 2019 19:00 )    Color: LIGHT YELLOW / Appearance: CLEAR / S.013 / pH: 6.5  Gluc: NEGATIVE / Ketone: NEGATIVE  / Bili: NEGATIVE / Urobili: NORMAL   Blood: NEGATIVE / Protein: NEGATIVE / Nitrite: NEGATIVE   Leuk Esterase: NEGATIVE / RBC: x / WBC x   Sq Epi: x / Non Sq Epi: x / Bacteria: x    MICROBIOLOGY:    BLOOD VENOUS  19 NGTD    BLOOD PERIPHERAL  19 NGTD    URINE CATHETER  19 NGTD    RADIOLOGY:    3/26 CXR:    INTERPRETATION:     Heart size and the mediastinum cannot be accurately evaluated on this   projection.  There are low lung volumes.  The included right lung is clear.  There is continued hazy opacity in the periphery of the left mid to lower   lung without obscuration of the left hemidiaphragm. This is unchanged   from the  image of the CT scan which demonstrated prominent   epicardial fat in this region. The remainder of the visualized left lung   is clear.  No pleural effusion is seen. There is osteoarthritic degenerative change   of the spine.    IMPRESSION:  Portions of the upper lungs obscured by overlying chin.   Visualized lungs show no focal lung consolidation.

## 2019-03-29 ENCOUNTER — TRANSCRIPTION ENCOUNTER (OUTPATIENT)
Age: 54
End: 2019-03-29

## 2019-03-29 LAB
ALBUMIN SERPL ELPH-MCNC: 3 G/DL — LOW (ref 3.3–5)
ALP SERPL-CCNC: 76 U/L — SIGNIFICANT CHANGE UP (ref 40–120)
ALT FLD-CCNC: 48 U/L — HIGH (ref 4–33)
ANION GAP SERPL CALC-SCNC: 12 MMO/L — SIGNIFICANT CHANGE UP (ref 7–14)
AST SERPL-CCNC: 47 U/L — HIGH (ref 4–32)
BASOPHILS # BLD AUTO: 0.04 K/UL — SIGNIFICANT CHANGE UP (ref 0–0.2)
BASOPHILS NFR BLD AUTO: 0.5 % — SIGNIFICANT CHANGE UP (ref 0–2)
BILIRUB SERPL-MCNC: 0.4 MG/DL — SIGNIFICANT CHANGE UP (ref 0.2–1.2)
BUN SERPL-MCNC: 5 MG/DL — LOW (ref 7–23)
CALCIUM SERPL-MCNC: 9.1 MG/DL — SIGNIFICANT CHANGE UP (ref 8.4–10.5)
CHLORIDE SERPL-SCNC: 105 MMOL/L — SIGNIFICANT CHANGE UP (ref 98–107)
CO2 SERPL-SCNC: 25 MMOL/L — SIGNIFICANT CHANGE UP (ref 22–31)
CREAT SERPL-MCNC: 0.57 MG/DL — SIGNIFICANT CHANGE UP (ref 0.5–1.3)
EOSINOPHIL # BLD AUTO: 0.35 K/UL — SIGNIFICANT CHANGE UP (ref 0–0.5)
EOSINOPHIL NFR BLD AUTO: 4.4 % — SIGNIFICANT CHANGE UP (ref 0–6)
GLUCOSE SERPL-MCNC: 76 MG/DL — SIGNIFICANT CHANGE UP (ref 70–99)
HCT VFR BLD CALC: 42.5 % — SIGNIFICANT CHANGE UP (ref 34.5–45)
HGB BLD-MCNC: 13.2 G/DL — SIGNIFICANT CHANGE UP (ref 11.5–15.5)
IMM GRANULOCYTES NFR BLD AUTO: 1.1 % — SIGNIFICANT CHANGE UP (ref 0–1.5)
LYMPHOCYTES # BLD AUTO: 1.41 K/UL — SIGNIFICANT CHANGE UP (ref 1–3.3)
LYMPHOCYTES # BLD AUTO: 17.8 % — SIGNIFICANT CHANGE UP (ref 13–44)
MAGNESIUM SERPL-MCNC: 1.8 MG/DL — SIGNIFICANT CHANGE UP (ref 1.6–2.6)
MCHC RBC-ENTMCNC: 31.1 % — LOW (ref 32–36)
MCHC RBC-ENTMCNC: 31.2 PG — SIGNIFICANT CHANGE UP (ref 27–34)
MCV RBC AUTO: 100.5 FL — HIGH (ref 80–100)
MONOCYTES # BLD AUTO: 0.86 K/UL — SIGNIFICANT CHANGE UP (ref 0–0.9)
MONOCYTES NFR BLD AUTO: 10.8 % — SIGNIFICANT CHANGE UP (ref 2–14)
NEUTROPHILS # BLD AUTO: 5.19 K/UL — SIGNIFICANT CHANGE UP (ref 1.8–7.4)
NEUTROPHILS NFR BLD AUTO: 65.4 % — SIGNIFICANT CHANGE UP (ref 43–77)
NRBC # FLD: 0 K/UL — SIGNIFICANT CHANGE UP (ref 0–0)
PHOSPHATE SERPL-MCNC: 3.2 MG/DL — SIGNIFICANT CHANGE UP (ref 2.5–4.5)
PLATELET # BLD AUTO: 155 K/UL — SIGNIFICANT CHANGE UP (ref 150–400)
PMV BLD: 11.6 FL — SIGNIFICANT CHANGE UP (ref 7–13)
POTASSIUM SERPL-MCNC: 4.4 MMOL/L — SIGNIFICANT CHANGE UP (ref 3.5–5.3)
POTASSIUM SERPL-SCNC: 4.4 MMOL/L — SIGNIFICANT CHANGE UP (ref 3.5–5.3)
PROT SERPL-MCNC: 6.6 G/DL — SIGNIFICANT CHANGE UP (ref 6–8.3)
RBC # BLD: 4.23 M/UL — SIGNIFICANT CHANGE UP (ref 3.8–5.2)
RBC # FLD: 13.7 % — SIGNIFICANT CHANGE UP (ref 10.3–14.5)
SODIUM SERPL-SCNC: 142 MMOL/L — SIGNIFICANT CHANGE UP (ref 135–145)
WBC # BLD: 7.94 K/UL — SIGNIFICANT CHANGE UP (ref 3.8–10.5)
WBC # FLD AUTO: 7.94 K/UL — SIGNIFICANT CHANGE UP (ref 3.8–10.5)

## 2019-03-29 PROCEDURE — 99232 SBSQ HOSP IP/OBS MODERATE 35: CPT

## 2019-03-29 PROCEDURE — 99233 SBSQ HOSP IP/OBS HIGH 50: CPT

## 2019-03-29 RX ADMIN — Medication 1 SPRAY(S): at 17:50

## 2019-03-29 RX ADMIN — SODIUM CHLORIDE 75 MILLILITER(S): 9 INJECTION, SOLUTION INTRAVENOUS at 06:06

## 2019-03-29 RX ADMIN — LEVETIRACETAM 1000 MILLIGRAM(S): 250 TABLET, FILM COATED ORAL at 13:50

## 2019-03-29 RX ADMIN — DIVALPROEX SODIUM 500 MILLIGRAM(S): 500 TABLET, DELAYED RELEASE ORAL at 21:38

## 2019-03-29 RX ADMIN — LEVETIRACETAM 1000 MILLIGRAM(S): 250 TABLET, FILM COATED ORAL at 21:38

## 2019-03-29 RX ADMIN — Medication 100 MILLIGRAM(S): at 06:08

## 2019-03-29 RX ADMIN — Medication 325 MILLIGRAM(S): at 13:50

## 2019-03-29 RX ADMIN — Medication 1 MILLIGRAM(S): at 13:50

## 2019-03-29 RX ADMIN — Medication 1 SPRAY(S): at 06:07

## 2019-03-29 RX ADMIN — Medication 100 MILLIGRAM(S): at 21:38

## 2019-03-29 RX ADMIN — Medication 0.5 MILLIGRAM(S): at 10:24

## 2019-03-29 RX ADMIN — DIVALPROEX SODIUM 500 MILLIGRAM(S): 500 TABLET, DELAYED RELEASE ORAL at 13:50

## 2019-03-29 RX ADMIN — Medication 1 TABLET(S): at 13:50

## 2019-03-29 RX ADMIN — DIVALPROEX SODIUM 500 MILLIGRAM(S): 500 TABLET, DELAYED RELEASE ORAL at 06:13

## 2019-03-29 RX ADMIN — Medication 1 MILLIGRAM(S): at 06:13

## 2019-03-29 RX ADMIN — Medication 1 DROP(S): at 06:06

## 2019-03-29 RX ADMIN — PANTOPRAZOLE SODIUM 40 MILLIGRAM(S): 20 TABLET, DELAYED RELEASE ORAL at 06:13

## 2019-03-29 RX ADMIN — PIPERACILLIN AND TAZOBACTAM 25 GRAM(S): 4; .5 INJECTION, POWDER, LYOPHILIZED, FOR SOLUTION INTRAVENOUS at 13:50

## 2019-03-29 RX ADMIN — Medication 81 MILLIGRAM(S): at 13:50

## 2019-03-29 RX ADMIN — ENOXAPARIN SODIUM 40 MILLIGRAM(S): 100 INJECTION SUBCUTANEOUS at 06:07

## 2019-03-29 RX ADMIN — SODIUM CHLORIDE 75 MILLILITER(S): 9 INJECTION, SOLUTION INTRAVENOUS at 08:20

## 2019-03-29 RX ADMIN — LEVETIRACETAM 1000 MILLIGRAM(S): 250 TABLET, FILM COATED ORAL at 06:13

## 2019-03-29 RX ADMIN — Medication 1 DROP(S): at 17:50

## 2019-03-29 RX ADMIN — PIPERACILLIN AND TAZOBACTAM 25 GRAM(S): 4; .5 INJECTION, POWDER, LYOPHILIZED, FOR SOLUTION INTRAVENOUS at 22:25

## 2019-03-29 RX ADMIN — Medication 1 MILLIGRAM(S): at 21:47

## 2019-03-29 RX ADMIN — OLANZAPINE 7.5 MILLIGRAM(S): 15 TABLET, FILM COATED ORAL at 21:38

## 2019-03-29 RX ADMIN — Medication 100 MILLIGRAM(S): at 13:50

## 2019-03-29 RX ADMIN — PIPERACILLIN AND TAZOBACTAM 25 GRAM(S): 4; .5 INJECTION, POWDER, LYOPHILIZED, FOR SOLUTION INTRAVENOUS at 06:13

## 2019-03-29 NOTE — PROGRESS NOTE ADULT - SUBJECTIVE AND OBJECTIVE BOX
Patient is a 53y old  Female who presents with a chief complaint of seizures (28 Mar 2019 14:39)      SUBJECTIVE / OVERNIGHT EVENTS: No acute events overnight. ROS unattainable given nonverbal state.    MEDICATIONS  (STANDING):  artificial  tears Solution 1 Drop(s) Both EYES two times a day  aspirin  chewable 81 milliGRAM(s) Oral daily  buDESOnide   0.5 milliGRAM(s) Respule 0.5 milliGRAM(s) Inhalation daily  dextrose 5% + lactated ringers. 1000 milliLiter(s) (75 mL/Hr) IV Continuous <Continuous>  diVALproex  milliGRAM(s) Oral every 8 hours  docusate sodium 100 milliGRAM(s) Oral three times a day  enoxaparin Injectable 40 milliGRAM(s) SubCutaneous every 24 hours  ferrous    sulfate 325 milliGRAM(s) Oral daily  fluticasone propionate 50 MICROgram(s)/spray Nasal Spray 1 Spray(s) Both Nostrils two times a day  folic acid 1 milliGRAM(s) Oral daily  levETIRAcetam 1000 milliGRAM(s) Oral <User Schedule>  LORazepam     Tablet 1 milliGRAM(s) Oral every 8 hours  multivitamin 1 Tablet(s) Oral daily  OLANZapine 7.5 milliGRAM(s) Oral at bedtime  pantoprazole    Tablet 40 milliGRAM(s) Oral before breakfast  piperacillin/tazobactam IVPB. 3.375 Gram(s) IV Intermittent every 8 hours    MEDICATIONS  (PRN):  LORazepam   Injectable 1 milliGRAM(s) IntraMuscular every 6 hours PRN seizure      T(C): 36.3 (03-29-19 @ 12:20), Max: 36.4 (03-28-19 @ 14:10)  HR: 80 (03-29-19 @ 12:20) (76 - 85)  BP: 131/89 (03-29-19 @ 12:20) (118/67 - 148/87)  RR: 18 (03-29-19 @ 12:20) (16 - 18)  SpO2: 94% (03-29-19 @ 12:20) (94% - 100%)  CAPILLARY BLOOD GLUCOSE        I&O's Summary    PHYSICAL EXAM:  GENERAL: cognitively impaired woman, in no distress, withdraws to any attempts at examining  EYES: sclera clear  CHEST/LUNG: unable to examine due to patient agitation  HEART: unable to examine due to patient agitation  ABDOMEN: unable to examine due to patient agitation  EXTREMITIES: No clubbing, cyanosis, or edema  PSYCH: easily agitated when attempting to examine and becomes combativ    LABS:                        13.2   7.94  )-----------( 155      ( 29 Mar 2019 05:20 )             42.5     03-29    142  |  105  |  5<L>  ----------------------------<  76  4.4   |  25  |  0.57    Ca    9.1      29 Mar 2019 05:20  Phos  3.2     03-29  Mg     1.8     03-29    TPro  6.6  /  Alb  3.0<L>  /  TBili  0.4  /  DBili  x   /  AST  47<H>  /  ALT  48<H>  /  AlkPhos  76  03-29              RADIOLOGY & ADDITIONAL TESTS:

## 2019-03-29 NOTE — DISCHARGE NOTE PROVIDER - PROVIDER TOKENS
PROVIDER:[TOKEN:[[id number]],FOLLOWUP:[1 week]],FREE:[LAST:[[facility]],PHONE:[phone]],FAX:[(   )    -],FOLLOWUP:[2 weeks]]

## 2019-03-29 NOTE — DISCHARGE NOTE PROVIDER - CARE PROVIDER_API CALL
Tex Skinner)  Neurology  [address], [address]  [place] [address]  Phone: [phone]  Fax: [phone]  Follow Up Time: 1 week    [facility],   Phone: [phone]  Fax: (   )    -  Follow Up Time: 2 weeks

## 2019-03-29 NOTE — DISCHARGE NOTE PROVIDER - HOSPITAL COURSE
54 y/o F PMH CP nonverbal at baseline and seizure disorder admitted for increased seizures.         COURSE_______ 52 y/o F PMH CP nonverbal at baseline and seizure disorder admitted for increased seizures.         COURSE        Breakthrough Seizure second to noncompliance with medicatiosn     - 3/21: s/p RRT for questionable status epilepticus. Meds being adjusted by Neuro.      - 3/23: EEG taken off by tech second to patient pulling on leads. No need to continue.     - Tapering off Vimpat. 100mg 3/21 then 100/50 3/22 then 50BID 3/23 then 50QD 3/24 then done. Continue on Keppra and Depakote.          Hypothermia r/o Infectious Etiology - CXR negative. UA/UCx NGTD. BCx NGTD    Hypotensive and hypothermic (Bolus x 2 and bear hugger given), resolved     	Vitals q 4 hours    	Trend WBC, temperature curve        Adult FTT secondary to Dysphagia.     - Aids reporting that patient has not been eating at home and has been more lethargic than baseline. Will restart dysphagia diet     - Aspiration precautions     - Prior discussions with mother (per EMR) appear that she is against PEG at this time, if it becomes and issue, would readress         Functional Quadriplegia    - Turn regularly to prevent ulcers     - Assistance with meals        ASB (Asymptomatic bacteriuria)    - Patient w/ bacteria and WBC in urine, asymptomatic -> no tenderness in suprapubic area, positive epithelia cells (likely contaminated)    - Will monitor for now        Transaminitis    - Appear to be chronic, likely in setting of medications     - Recheck as outpt        Cerebral palsy    - Non verbal, non-ambulatory at baseline     - Supportive measures Mrs. Ponce is a 54 YO F with PMHx of CP, nonverbal at baseline and seizure disorder admitted for breakthrough seizures.         Hospital Course     The patient was admitted for breakthrough seizures and restarted on PO antiepileptic medications. Mrs. Ponce was noted to not being taking/ refusing her PO medications likely second to post-ictal phase vs noncompliance. Infectious work up and occult pathology for breakthrough seizures noted to be negative. On 3/21, the patient was noted with seizure activity involving her face (lip twitching and torso involuntary movements).  Seizure likely activity lasted > 10 minutes despite Ativan 1mg injections. RRT called. Seizure activity resolved. Neurology tailored medications. Vimpat tapered off and the patient was continued on Keppra and Depakote with no recurrent seizure activities noted. During admission the patient was also noted with questionable dysphagia second to post-ical/ lethagic phases. With resolution of seizure activity and adequate ingestion/ compliance with PO medication, diet noted to be tolerated without complications. On 4/2, the patient was noted to be medically cleared for discharge. Upon discharge vitals, the patient was noted with Hypothermia of unknown source. Hypothermic blanket applied. Cultures drawn noted to be negative. Zosyn started and CT CHEST obtained with concerns for aspiration. CT CHEST noted to be negative. TSH and AM Cortisol levels noted to be normal as well.  CT AP noted __________. Hypothermia likely central in nature; supportive management recommended.         On ____, case discussed with  _______ and the patient is medically cleared and stable for discharge. Mrs. Ponce is a 54 YO F with PMHx of CP, nonverbal at baseline and seizure disorder admitted for breakthrough seizures.         Hospital Course     The patient was admitted for breakthrough seizures and restarted on PO antiepileptic medications. Mrs. Ponce was noted to not being taking/ refusing her PO medications likely second to post-ictal phase vs noncompliance. Infectious work up and occult pathology for breakthrough seizures noted to be negative. On 3/21, the patient was noted with seizure activity involving her face (lip twitching and torso involuntary movements).  Seizure likely activity lasted > 10 minutes despite Ativan 1mg injections. RRT called. Seizure activity resolved. Neurology tailored medications. Vimpat tapered off and the patient was continued on Keppra and Depakote with no recurrent seizure activities noted. During admission the patient was also noted with questionable dysphagia second to post-ical/ lethagic phases. With resolution of seizure activity and adequate ingestion/ compliance with PO medication, diet noted to be tolerated without complications. On 4/2, the patient was noted to be medically cleared for discharge. Upon discharge vitals, the patient was noted with Hypothermia of unknown source. Hypothermic blanket applied. Cultures drawn noted to be negative. Zosyn started and CT CHEST obtained with concerns for aspiration. CT CHEST noted to be negative. TSH and AM Cortisol levels noted to be normal as well.  Hypothermia likely central in nature; supportive management recommended.         Dispo to Group Home

## 2019-03-29 NOTE — PROGRESS NOTE ADULT - PROBLEM SELECTOR PLAN 2
followed closely by neuro- AEDs adjusted - weaned off Vimpat, c/w depakote 500 q8, Keppra 1000 q8,  Ativan 1q8  pt tolerating diet chopped to 1/4 inch pieces, meds in applesauce

## 2019-03-29 NOTE — DISCHARGE NOTE PROVIDER - NSFOLLOWUPCLINICS_GEN_ALL_ED_FT
[facility]  Internal Medicine  [address] [place] [id number]  Phone: [phone]  Fax:   Follow Up Time:     [facility]  Psychiatry  [address] [facility] [address] [facility] [place] [address]  Phone: [phone]  Fax:   Follow Up Time:

## 2019-03-29 NOTE — PROGRESS NOTE ADULT - ATTENDING COMMENTS
[facility]  Jd Barlow [phone]  mother Brianna Ponce [phone]: Mother made decision in past for no feeding tube.    Possible DC back to group home on Mon if still taking PO seizure meds

## 2019-03-29 NOTE — PROGRESS NOTE ADULT - SUBJECTIVE AND OBJECTIVE BOX
CC: F/U for PNA    Saw/spoke to patient. No fevers, no hypothermia. Mental status back to baseline? Opens eyes spontaneously. Non-verbal. Able to interact with interviewer.    Allergies  Topamax (Unknown)    ANTIMICROBIALS:  piperacillin/tazobactam IVPB. 3.375 every 8 hours    PE:    Vital Signs Last 24 Hrs  T(C): 36.4 (29 Mar 2019 08:23), Max: 36.4 (28 Mar 2019 14:10)  T(F): 97.6 (29 Mar 2019 08:23), Max: 97.6 (28 Mar 2019 14:10)  HR: 80 (29 Mar 2019 08:23) (76 - 85)  BP: 138/91 (29 Mar 2019 08:23) (118/67 - 148/87)  RR: 16 (29 Mar 2019 08:23) (16 - 18)  SpO2: 95% (29 Mar 2019 08:23) (94% - 100%)    Gen: AOx0, nonverbal, poor communicative ability to to CP  CV: S1+S2 normal, nontachycardic  Resp: Clear bilat, no resp distress, no crackles/wheezes  Abd: Soft, nontender, +BS  Ext: No LE edema, no wounds    LABS:                        13.2   7.94  )-----------( 155      ( 29 Mar 2019 05:20 )             42.5     03-29    142  |  105  |  5<L>  ----------------------------<  76  4.4   |  25  |  0.57    Ca    9.1      29 Mar 2019 05:20  Phos  3.2     03-29  Mg     1.8     03-29    TPro  6.6  /  Alb  3.0<L>  /  TBili  0.4  /  DBili  x   /  AST  47<H>  /  ALT  48<H>  /  AlkPhos  76  03-29    MICROBIOLOGY:    BLOOD VENOUS  03-26-19 NGTD    BLOOD PERIPHERAL  03-26-19 NGTD    RADIOLOGY:    3/26 CXR:    IMPRESSION:  Portions of the upper lungs obscured by overlying chin.   Visualized lungs show no focal lung consolidation.

## 2019-03-29 NOTE — DISCHARGE NOTE PROVIDER - NSDCCPCAREPLAN_GEN_ALL_CORE_FT
PRINCIPAL DISCHARGE DIAGNOSIS  Diagnosis: Partial seizure  Assessment and Plan of Treatment: In setting of non-compliance with anti-seizure medications. Continue your medications as directed and please follow-up as an outpatient with your primary care provider for further care and recommendations. PRINCIPAL DISCHARGE DIAGNOSIS  Diagnosis: Partial seizure  Assessment and Plan of Treatment: In setting of non-compliance with anti-seizure medications. Continue your medications as directed and please follow-up as an outpatient with your primary care provider for further care and recommendations.      SECONDARY DISCHARGE DIAGNOSES  Diagnosis: Cerebral palsy, unspecified type  Assessment and Plan of Treatment: Supportive care and continue medications  minimally verbal at baseline  diet chopped to 1/4 inch pieces, aspiration precautions.       Diagnosis: Seizures  Assessment and Plan of Treatment: Continue your medications as directed and please follow-up as an outpatient with your primary care provider for further care and recommendations.    Diagnosis: Hypothermia, initial encounter  Assessment and Plan of Treatment: In setting of presumed infection completed antibiotics and temperature improved.  resolved, infection w/u negative  - appreciate ID input - c/w zosyn for possible aspiration- day 6/7  5-7 day course per ID.       Diagnosis: Transaminitis  Assessment and Plan of Treatment: Improved - Monitor liver function as outpatient with PCP within 1 week    Diagnosis: Severe protein-calorie malnutrition  Assessment and Plan of Treatment: Encourage oral hydration and nutrition. Can supplement with Ensure 2-3 times per day. PRINCIPAL DISCHARGE DIAGNOSIS  Diagnosis: Partial seizure  Assessment and Plan of Treatment: In setting of non-compliance with anti-seizure medications. Continue your medications as directed and please follow-up as an outpatient with your primary care provider for further care and recommendations. Follow up with Neurology outpatient.      SECONDARY DISCHARGE DIAGNOSES  Diagnosis: Seizures  Assessment and Plan of Treatment: Continue your medications as directed and please follow-up as an outpatient with your primary care provider for further care and recommendations. Follow up with Neurology outpatient.    Diagnosis: Severe protein-calorie malnutrition  Assessment and Plan of Treatment: Encourage oral hydration and nutrition. Can supplement with Ensure 2-3 times per day.    Diagnosis: Transaminitis  Assessment and Plan of Treatment: Improved - Monitor liver function as outpatient with Please follow up with your primary care physician after discharge for continued monitoring and management. within 1 week    Diagnosis: Cerebral palsy, unspecified type  Assessment and Plan of Treatment: Supportive care and continue medications. Minimally verbal at baseline. Diet chopped to 1/4 inch pieces, aspiration precautions.       Diagnosis: Hypothermia, initial encounter  Assessment and Plan of Treatment: In setting of presumed infection completed antibiotics and temperature improved. Resolved, infection w/u negative. Appreciate ID input - c/w Augmentin x 14 day course (end date ______). PRINCIPAL DISCHARGE DIAGNOSIS  Diagnosis: Partial seizure  Assessment and Plan of Treatment: In setting of non-compliance with anti-seizure medications. Continue your medications as directed and please follow-up as an outpatient with your primary care provider for further care and recommendations. Follow up with Neurology outpatient.      SECONDARY DISCHARGE DIAGNOSES  Diagnosis: Severe protein-calorie malnutrition  Assessment and Plan of Treatment: Encourage oral hydration and nutrition. Can supplement with Ensure 2-3 times per day.    Diagnosis: Transaminitis  Assessment and Plan of Treatment: Improved - Monitor liver function as outpatient with Please follow up with your primary care physician after discharge for continued monitoring and management. within 1 week    Diagnosis: Cerebral palsy, unspecified type  Assessment and Plan of Treatment: Supportive care and continue medications. Minimally verbal at baseline. Diet chopped to 1/4 inch pieces, aspiration precautions.       Diagnosis: Hypothermia, initial encounter  Assessment and Plan of Treatment: In setting of presumed infection completed antibiotics and temperature improved. Resolved, infection and antibiotics complete.

## 2019-03-29 NOTE — PROGRESS NOTE ADULT - ASSESSMENT
53 F h/o seizure disorder, cerebral palsy initially adm 3/19 for worsening seizure activity.  During hospital stay, patient developed worsening mental status, hypothermia  Appears lethargic  Mild leukopenia  LFTs slightly elevated  Otherwise not able to provide further focal history  Aspiration vs occult biliary process?  Hypothermia improved, mental status improved to presumed baseline  Complete course for presumed aspiration pna  Overall, Hypothermia, AMS, cerebral palsy, seizure, ? aspiration  - Zosyn 3.375g q 8 x 5-7 day course  - F/U pending cultures  - Trend WBC, temperature curve  - Trend LFTs--on this episode did not have any episodes of abd pain, no RUQ pain; if rising LFTs would warrant further investigation for ? cholecystitis  - Will sign off. Please call with further questions or change in status.    Carter Sparrow MD  Pager [phone]  After 5pm and on weekends call [phone]

## 2019-03-29 NOTE — PROGRESS NOTE ADULT - PROBLEM SELECTOR PLAN 1
resolved, infection w/u negative  - appreciate ID input - c/w zosyn for possible aspiration- day 4  5-7 day course per ID

## 2019-03-30 LAB
ALBUMIN SERPL ELPH-MCNC: 3.1 G/DL — LOW (ref 3.3–5)
ALP SERPL-CCNC: 77 U/L — SIGNIFICANT CHANGE UP (ref 40–120)
ALT FLD-CCNC: 40 U/L — HIGH (ref 4–33)
ANION GAP SERPL CALC-SCNC: 14 MMO/L — SIGNIFICANT CHANGE UP (ref 7–14)
AST SERPL-CCNC: 33 U/L — HIGH (ref 4–32)
BILIRUB SERPL-MCNC: 0.3 MG/DL — SIGNIFICANT CHANGE UP (ref 0.2–1.2)
BUN SERPL-MCNC: 6 MG/DL — LOW (ref 7–23)
CALCIUM SERPL-MCNC: 9.4 MG/DL — SIGNIFICANT CHANGE UP (ref 8.4–10.5)
CHLORIDE SERPL-SCNC: 103 MMOL/L — SIGNIFICANT CHANGE UP (ref 98–107)
CO2 SERPL-SCNC: 24 MMOL/L — SIGNIFICANT CHANGE UP (ref 22–31)
CREAT SERPL-MCNC: 0.62 MG/DL — SIGNIFICANT CHANGE UP (ref 0.5–1.3)
GLUCOSE SERPL-MCNC: 83 MG/DL — SIGNIFICANT CHANGE UP (ref 70–99)
HCT VFR BLD CALC: 47.8 % — HIGH (ref 34.5–45)
HGB BLD-MCNC: 14.8 G/DL — SIGNIFICANT CHANGE UP (ref 11.5–15.5)
MAGNESIUM SERPL-MCNC: 1.9 MG/DL — SIGNIFICANT CHANGE UP (ref 1.6–2.6)
MCHC RBC-ENTMCNC: 31 % — LOW (ref 32–36)
MCHC RBC-ENTMCNC: 31 PG — SIGNIFICANT CHANGE UP (ref 27–34)
MCV RBC AUTO: 100.2 FL — HIGH (ref 80–100)
NRBC # FLD: 0.03 K/UL — SIGNIFICANT CHANGE UP (ref 0–0)
PHOSPHATE SERPL-MCNC: 3.9 MG/DL — SIGNIFICANT CHANGE UP (ref 2.5–4.5)
PLATELET # BLD AUTO: 134 K/UL — LOW (ref 150–400)
PMV BLD: 11.5 FL — SIGNIFICANT CHANGE UP (ref 7–13)
POTASSIUM SERPL-MCNC: 4.7 MMOL/L — SIGNIFICANT CHANGE UP (ref 3.5–5.3)
POTASSIUM SERPL-SCNC: 4.7 MMOL/L — SIGNIFICANT CHANGE UP (ref 3.5–5.3)
PROT SERPL-MCNC: 7.1 G/DL — SIGNIFICANT CHANGE UP (ref 6–8.3)
RBC # BLD: 4.77 M/UL — SIGNIFICANT CHANGE UP (ref 3.8–5.2)
RBC # FLD: 13.7 % — SIGNIFICANT CHANGE UP (ref 10.3–14.5)
SODIUM SERPL-SCNC: 141 MMOL/L — SIGNIFICANT CHANGE UP (ref 135–145)
WBC # BLD: 5.52 K/UL — SIGNIFICANT CHANGE UP (ref 3.8–10.5)
WBC # FLD AUTO: 5.52 K/UL — SIGNIFICANT CHANGE UP (ref 3.8–10.5)

## 2019-03-30 PROCEDURE — 99232 SBSQ HOSP IP/OBS MODERATE 35: CPT

## 2019-03-30 RX ADMIN — LEVETIRACETAM 1000 MILLIGRAM(S): 250 TABLET, FILM COATED ORAL at 14:04

## 2019-03-30 RX ADMIN — PANTOPRAZOLE SODIUM 40 MILLIGRAM(S): 20 TABLET, DELAYED RELEASE ORAL at 06:22

## 2019-03-30 RX ADMIN — SODIUM CHLORIDE 75 MILLILITER(S): 9 INJECTION, SOLUTION INTRAVENOUS at 21:26

## 2019-03-30 RX ADMIN — Medication 0.5 MILLIGRAM(S): at 09:57

## 2019-03-30 RX ADMIN — DIVALPROEX SODIUM 500 MILLIGRAM(S): 500 TABLET, DELAYED RELEASE ORAL at 06:22

## 2019-03-30 RX ADMIN — PIPERACILLIN AND TAZOBACTAM 25 GRAM(S): 4; .5 INJECTION, POWDER, LYOPHILIZED, FOR SOLUTION INTRAVENOUS at 21:35

## 2019-03-30 RX ADMIN — Medication 100 MILLIGRAM(S): at 06:22

## 2019-03-30 RX ADMIN — Medication 1 DROP(S): at 17:00

## 2019-03-30 RX ADMIN — Medication 1 MILLIGRAM(S): at 06:22

## 2019-03-30 RX ADMIN — Medication 100 MILLIGRAM(S): at 21:23

## 2019-03-30 RX ADMIN — DIVALPROEX SODIUM 500 MILLIGRAM(S): 500 TABLET, DELAYED RELEASE ORAL at 21:23

## 2019-03-30 RX ADMIN — LEVETIRACETAM 1000 MILLIGRAM(S): 250 TABLET, FILM COATED ORAL at 06:22

## 2019-03-30 RX ADMIN — Medication 1 MILLIGRAM(S): at 14:04

## 2019-03-30 RX ADMIN — Medication 1 SPRAY(S): at 17:01

## 2019-03-30 RX ADMIN — ENOXAPARIN SODIUM 40 MILLIGRAM(S): 100 INJECTION SUBCUTANEOUS at 06:21

## 2019-03-30 RX ADMIN — OLANZAPINE 7.5 MILLIGRAM(S): 15 TABLET, FILM COATED ORAL at 21:23

## 2019-03-30 RX ADMIN — Medication 100 MILLIGRAM(S): at 08:56

## 2019-03-30 RX ADMIN — LEVETIRACETAM 1000 MILLIGRAM(S): 250 TABLET, FILM COATED ORAL at 21:23

## 2019-03-30 RX ADMIN — Medication 1 SPRAY(S): at 06:21

## 2019-03-30 RX ADMIN — Medication 81 MILLIGRAM(S): at 08:56

## 2019-03-30 RX ADMIN — PIPERACILLIN AND TAZOBACTAM 25 GRAM(S): 4; .5 INJECTION, POWDER, LYOPHILIZED, FOR SOLUTION INTRAVENOUS at 13:58

## 2019-03-30 RX ADMIN — Medication 1 MILLIGRAM(S): at 21:23

## 2019-03-30 RX ADMIN — Medication 1 TABLET(S): at 08:55

## 2019-03-30 RX ADMIN — Medication 1 DROP(S): at 06:21

## 2019-03-30 RX ADMIN — PIPERACILLIN AND TAZOBACTAM 25 GRAM(S): 4; .5 INJECTION, POWDER, LYOPHILIZED, FOR SOLUTION INTRAVENOUS at 06:27

## 2019-03-30 RX ADMIN — DIVALPROEX SODIUM 500 MILLIGRAM(S): 500 TABLET, DELAYED RELEASE ORAL at 14:04

## 2019-03-30 RX ADMIN — Medication 325 MILLIGRAM(S): at 08:55

## 2019-03-30 NOTE — PROGRESS NOTE ADULT - ATTENDING COMMENTS
[facility]  Jd Barlow [phone]  mother Brianna Ponce [phone]: Mother made decision in past for no feeding tube.    Possible DC back to group home on Mon if still taking PO seizure meds

## 2019-03-30 NOTE — PROGRESS NOTE ADULT - SUBJECTIVE AND OBJECTIVE BOX
INCOMPLETE  No PRNs required overnight Patient is a 53y old  Female who presents with a chief complaint of seizures (30 Mar 2019 08:23)      SUBJECTIVE / OVERNIGHT EVENTS: No acute events overnight. No PRNs required overnight. Taking seizure meds by mouth per RN. ROS unattainable given nonverbal state.      MEDICATIONS  (STANDING):  artificial  tears Solution 1 Drop(s) Both EYES two times a day  aspirin  chewable 81 milliGRAM(s) Oral daily  buDESOnide   0.5 milliGRAM(s) Respule 0.5 milliGRAM(s) Inhalation daily  dextrose 5% + lactated ringers. 1000 milliLiter(s) (75 mL/Hr) IV Continuous <Continuous>  diVALproex  milliGRAM(s) Oral every 8 hours  docusate sodium 100 milliGRAM(s) Oral three times a day  enoxaparin Injectable 40 milliGRAM(s) SubCutaneous every 24 hours  ferrous    sulfate 325 milliGRAM(s) Oral daily  fluticasone propionate 50 MICROgram(s)/spray Nasal Spray 1 Spray(s) Both Nostrils two times a day  folic acid 1 milliGRAM(s) Oral daily  levETIRAcetam 1000 milliGRAM(s) Oral <User Schedule>  LORazepam     Tablet 1 milliGRAM(s) Oral every 8 hours  multivitamin 1 Tablet(s) Oral daily  OLANZapine 7.5 milliGRAM(s) Oral at bedtime  pantoprazole    Tablet 40 milliGRAM(s) Oral before breakfast  piperacillin/tazobactam IVPB. 3.375 Gram(s) IV Intermittent every 8 hours    MEDICATIONS  (PRN):  LORazepam   Injectable 1 milliGRAM(s) IntraMuscular every 6 hours PRN seizure      T(C): 36.2 (03-30-19 @ 07:42), Max: 36.8 (03-29-19 @ 16:20)  HR: 78 (03-30-19 @ 09:58) (70 - 87)  BP: 130/72 (03-30-19 @ 07:42) (100/56 - 142/91)  RR: 18 (03-30-19 @ 07:42) (18 - 18)  SpO2: 100% (03-30-19 @ 07:42) (94% - 100%)  CAPILLARY BLOOD GLUCOSE        I&O's Summary    PHYSICAL EXAM:  GENERAL: cognitively impaired woman, in no distress, withdraws to any attempts at examination by provider  EYES: sclera clear  CHEST/LUNG: clear but limited given patient agitation  HEART: limited exam given agitation, unable to appreciate murmurs  ABDOMEN: soft  EXTREMITIES: No clubbing, cyanosis, or edema  PSYCH: easily agitated when attempting to examine and becomes combative    LABS:                        14.8   5.52  )-----------( 134      ( 30 Mar 2019 05:50 )             47.8     03-30    141  |  103  |  6<L>  ----------------------------<  83  4.7   |  24  |  0.62    Ca    9.4      30 Mar 2019 05:50  Phos  3.9     03-30  Mg     1.9     03-30    TPro  7.1  /  Alb  3.1<L>  /  TBili  0.3  /  DBili  x   /  AST  33<H>  /  ALT  40<H>  /  AlkPhos  77  03-30              RADIOLOGY & ADDITIONAL TESTS:

## 2019-03-30 NOTE — PROGRESS NOTE ADULT - PROBLEM SELECTOR PLAN 2
seen by neuro, AEDs adjusted - weaned off Vimpat, c/w depakote 500 q8, Keppra 1000 q8,  Ativan 1q8  pt tolerating diet chopped to 1/4 inch pieces, meds in applesauce and is now taking seizure meds

## 2019-03-30 NOTE — PROGRESS NOTE ADULT - PROBLEM SELECTOR PLAN 1
resolved, infection w/u negative  - appreciate ID input - c/w zosyn for possible aspiration- day 5  5-7 day course per ID

## 2019-03-31 LAB
BACTERIA BLD CULT: SIGNIFICANT CHANGE UP
BACTERIA BLD CULT: SIGNIFICANT CHANGE UP

## 2019-03-31 PROCEDURE — 99232 SBSQ HOSP IP/OBS MODERATE 35: CPT

## 2019-03-31 RX ORDER — DIVALPROEX SODIUM 500 MG/1
1 TABLET, DELAYED RELEASE ORAL
Qty: 90 | Refills: 0
Start: 2019-03-31 | End: 2019-04-29

## 2019-03-31 RX ORDER — LEVETIRACETAM 250 MG/1
1 TABLET, FILM COATED ORAL
Qty: 90 | Refills: 0
Start: 2019-03-31 | End: 2019-04-29

## 2019-03-31 RX ORDER — LEVOFLOXACIN 5 MG/ML
750 INJECTION, SOLUTION INTRAVENOUS EVERY 24 HOURS
Refills: 0 | Status: COMPLETED | OUTPATIENT
Start: 2019-04-01 | End: 2019-04-01

## 2019-03-31 RX ADMIN — Medication 1 SPRAY(S): at 05:48

## 2019-03-31 RX ADMIN — Medication 325 MILLIGRAM(S): at 13:09

## 2019-03-31 RX ADMIN — Medication 0.5 MILLIGRAM(S): at 10:37

## 2019-03-31 RX ADMIN — LEVETIRACETAM 1000 MILLIGRAM(S): 250 TABLET, FILM COATED ORAL at 21:26

## 2019-03-31 RX ADMIN — Medication 81 MILLIGRAM(S): at 13:09

## 2019-03-31 RX ADMIN — Medication 1 TABLET(S): at 13:09

## 2019-03-31 RX ADMIN — Medication 100 MILLIGRAM(S): at 21:26

## 2019-03-31 RX ADMIN — Medication 1 MILLIGRAM(S): at 13:13

## 2019-03-31 RX ADMIN — DIVALPROEX SODIUM 500 MILLIGRAM(S): 500 TABLET, DELAYED RELEASE ORAL at 21:26

## 2019-03-31 RX ADMIN — Medication 1 SPRAY(S): at 17:28

## 2019-03-31 RX ADMIN — Medication 1 MILLIGRAM(S): at 21:26

## 2019-03-31 RX ADMIN — ENOXAPARIN SODIUM 40 MILLIGRAM(S): 100 INJECTION SUBCUTANEOUS at 05:48

## 2019-03-31 RX ADMIN — OLANZAPINE 7.5 MILLIGRAM(S): 15 TABLET, FILM COATED ORAL at 21:26

## 2019-03-31 RX ADMIN — PIPERACILLIN AND TAZOBACTAM 25 GRAM(S): 4; .5 INJECTION, POWDER, LYOPHILIZED, FOR SOLUTION INTRAVENOUS at 06:27

## 2019-03-31 RX ADMIN — Medication 100 MILLIGRAM(S): at 05:47

## 2019-03-31 RX ADMIN — LEVETIRACETAM 1000 MILLIGRAM(S): 250 TABLET, FILM COATED ORAL at 05:47

## 2019-03-31 RX ADMIN — Medication 1 DROP(S): at 17:28

## 2019-03-31 RX ADMIN — PIPERACILLIN AND TAZOBACTAM 25 GRAM(S): 4; .5 INJECTION, POWDER, LYOPHILIZED, FOR SOLUTION INTRAVENOUS at 13:13

## 2019-03-31 RX ADMIN — Medication 100 MILLIGRAM(S): at 13:09

## 2019-03-31 RX ADMIN — PANTOPRAZOLE SODIUM 40 MILLIGRAM(S): 20 TABLET, DELAYED RELEASE ORAL at 05:47

## 2019-03-31 RX ADMIN — DIVALPROEX SODIUM 500 MILLIGRAM(S): 500 TABLET, DELAYED RELEASE ORAL at 13:09

## 2019-03-31 RX ADMIN — DIVALPROEX SODIUM 500 MILLIGRAM(S): 500 TABLET, DELAYED RELEASE ORAL at 05:47

## 2019-03-31 RX ADMIN — Medication 1 MILLIGRAM(S): at 05:47

## 2019-03-31 RX ADMIN — LEVETIRACETAM 1000 MILLIGRAM(S): 250 TABLET, FILM COATED ORAL at 13:09

## 2019-03-31 RX ADMIN — Medication 1 MILLIGRAM(S): at 13:09

## 2019-03-31 RX ADMIN — Medication 1 DROP(S): at 05:48

## 2019-03-31 NOTE — PROGRESS NOTE ADULT - ASSESSMENT
53F w/CP (non-verbal, non-ambulatory at baseline) presenting from care home with seizures.  Staff denies any seizures     continue Keppra and depakote     seizure precautions

## 2019-03-31 NOTE — PROGRESS NOTE ADULT - ATTENDING COMMENTS
[facility]  Jd Barlow [phone]  mother Brianna Ponce [phone]: Mother made decision in past for no feeding tube.    Possible DC back to group home on Mon if still taking PO seizure meds QC[facility]  Jd Barlow [phone]  mother Brianna Ponce [phone]: Mother made decision in past for no feeding tube.    Possible DC back to group home on Mon-4/1 if still taking PO seizure meds

## 2019-03-31 NOTE — PROGRESS NOTE ADULT - SUBJECTIVE AND OBJECTIVE BOX
INCOMPLETE  No PRNs required overnight Patient is a 53y old  Female who presents with a chief complaint of seizures (31 Mar 2019 10:34)      SUBJECTIVE / OVERNIGHT EVENTS: No acute events overnight. ROS unattainable given cognitive impairment and nonverbal status. Taking seizure meds by mouth.    MEDICATIONS  (STANDING):  artificial  tears Solution 1 Drop(s) Both EYES two times a day  aspirin  chewable 81 milliGRAM(s) Oral daily  buDESOnide   0.5 milliGRAM(s) Respule 0.5 milliGRAM(s) Inhalation daily  dextrose 5% + lactated ringers. 1000 milliLiter(s) (75 mL/Hr) IV Continuous <Continuous>  diVALproex  milliGRAM(s) Oral every 8 hours  docusate sodium 100 milliGRAM(s) Oral three times a day  enoxaparin Injectable 40 milliGRAM(s) SubCutaneous every 24 hours  ferrous    sulfate 325 milliGRAM(s) Oral daily  fluticasone propionate 50 MICROgram(s)/spray Nasal Spray 1 Spray(s) Both Nostrils two times a day  folic acid 1 milliGRAM(s) Oral daily  levETIRAcetam 1000 milliGRAM(s) Oral <User Schedule>  LORazepam     Tablet 1 milliGRAM(s) Oral every 8 hours  multivitamin 1 Tablet(s) Oral daily  OLANZapine 7.5 milliGRAM(s) Oral at bedtime  pantoprazole    Tablet 40 milliGRAM(s) Oral before breakfast  piperacillin/tazobactam IVPB. 3.375 Gram(s) IV Intermittent every 8 hours    MEDICATIONS  (PRN):  LORazepam   Injectable 1 milliGRAM(s) IntraMuscular every 6 hours PRN seizure      T(C): 36.4 (03-31-19 @ 07:30), Max: 36.4 (03-30-19 @ 19:33)  HR: 77 (03-31-19 @ 10:38) (67 - 84)  BP: 105/65 (03-31-19 @ 07:30) (102/58 - 126/74)  RR: 18 (03-31-19 @ 07:30) (17 - 18)  SpO2: 99% (03-31-19 @ 07:30) (97% - 99%)  CAPILLARY BLOOD GLUCOSE        I&O's Summary    PHYSICAL EXAM:  GENERAL: cognitively impaired woman, in no distress  EYES: sclera clear  CHEST/LUNG: clear to anterior auscultation b/l  HEART: s1/s2, no murmurs appreciated  ABDOMEN: soft, nontender, nondistended  EXTREMITIES: No clubbing, cyanosis, or edema  PSYCH: calm    LABS:                        14.8   5.52  )-----------( 134      ( 30 Mar 2019 05:50 )             47.8     03-30    141  |  103  |  6<L>  ----------------------------<  83  4.7   |  24  |  0.62    Ca    9.4      30 Mar 2019 05:50  Phos  3.9     03-30  Mg     1.9     03-30    TPro  7.1  /  Alb  3.1<L>  /  TBili  0.3  /  DBili  x   /  AST  33<H>  /  ALT  40<H>  /  AlkPhos  77  03-30              RADIOLOGY & ADDITIONAL TESTS:

## 2019-03-31 NOTE — PROGRESS NOTE ADULT - PROBLEM SELECTOR PLAN 1
resolved, infection w/u negative  - appreciate ID input - c/w zosyn for possible aspiration- day 6/7  5-7 day course per ID

## 2019-03-31 NOTE — PROGRESS NOTE ADULT - SUBJECTIVE AND OBJECTIVE BOX
Neurology Progress    NAYANA JACQUES53yFemale    HPI:  53F w/CP (non-verbal, non-ambulatory at baseline) presenting from care home with seizures. Information was attained from charts and home aid as pt is nonverbal. As per aid, they have noticed that the pt has not been herself for 2-3 weeks now. Aids reports that after being hospitalized from 2/27-3/15 (worked up for possible acuate choli), she has not been herself. They noted that at any given time, pt has been having a seizure, 10 secs in duration, every 20-30 mins. They note that pt has a post ictal period of 2 mins before returning to baseline. Seizures reportedly begin with a shriek and then tonic clonic movements.  Aids also report that they have has issues administering medication as pt refuses to swallow pills. They note that pt's PO intake has decreased especially over past 2 days. Aids denies any known fevers, chills, n/v/c/d, abd pain, shortness of breath.    Of note: pt hospitalized 2/27-3/15 w/abdominal pain (worked up for acute choli) resolved with abx course.    In the ED:   Vitals: 97.6, 95, 119/69, 96% on RA  Notable Labs and Imaging: CXR negative, U/A negative  Given: 1L NS, Vimpat 100, Kepra 1500 (19 Mar 2019 22:21)      Past Medical History  Intellectual disability  Constipation  Seizure disorder  Cerebral palsy      Past Surgical History  No significant past surgical history      MEDICATIONS    artificial  tears Solution 1 Drop(s) Both EYES two times a day  aspirin  chewable 81 milliGRAM(s) Oral daily  buDESOnide   0.5 milliGRAM(s) Respule 0.5 milliGRAM(s) Inhalation daily  dextrose 5% + lactated ringers. 1000 milliLiter(s) IV Continuous <Continuous>  diVALproex  milliGRAM(s) Oral every 8 hours  docusate sodium 100 milliGRAM(s) Oral three times a day  enoxaparin Injectable 40 milliGRAM(s) SubCutaneous every 24 hours  ferrous    sulfate 325 milliGRAM(s) Oral daily  fluticasone propionate 50 MICROgram(s)/spray Nasal Spray 1 Spray(s) Both Nostrils two times a day  folic acid 1 milliGRAM(s) Oral daily  levETIRAcetam 1000 milliGRAM(s) Oral <User Schedule>  LORazepam     Tablet 1 milliGRAM(s) Oral every 8 hours  LORazepam   Injectable 1 milliGRAM(s) IntraMuscular every 6 hours PRN  multivitamin 1 Tablet(s) Oral daily  OLANZapine 7.5 milliGRAM(s) Oral at bedtime  pantoprazole    Tablet 40 milliGRAM(s) Oral before breakfast  piperacillin/tazobactam IVPB. 3.375 Gram(s) IV Intermittent every 8 hours         Family history: No history of dementia, strokes, or seizures   FAMILY HISTORY:  No pertinent family history in first degree relatives    SOCIAL HISTORY -- No history of tobacco or alcohol use     Allergies    Topamax (Unknown)    Intolerances            Vital Signs Last 24 Hrs  T(C): 36.4 (31 Mar 2019 07:30), Max: 36.4 (30 Mar 2019 19:33)  T(F): 97.5 (31 Mar 2019 07:30), Max: 97.6 (30 Mar 2019 23:30)  HR: 67 (31 Mar 2019 07:30) (67 - 84)  BP: 105/65 (31 Mar 2019 07:30) (102/58 - 130/78)  BP(mean): --  RR: 18 (31 Mar 2019 07:30) (17 - 18)  SpO2: 99% (31 Mar 2019 07:30) (97% - 100%)        On Neurological Examination:    Mental Status - Patient is alert, awake, non verbal                             Cranial Nerves - Extraocular muscle intact  WINSTON Facial symmetry Tongue midline, CnV1to V3 intact gross hearing intact      Motor Exam - spastic quad      Sensory    Bilateral withdraw    GENERAL Exam:     Nontoxic , No Acute Distress   	  HEENT:  normocephalic, atraumatic  		  LUNGS:	Clear bilaterally  No Wheeze      VASCULAR: no carotid brui  	  HEART:	 Normal S1S2   No murmur RRR        	  MUSCULOSKELETAL: Normal Range of Motion  	   SKIN:      Normal   No Ecchymosis               LABS:  CBC Full  -  ( 30 Mar 2019 05:50 )  WBC Count : 5.52 K/uL  RBC Count : 4.77 M/uL  Hemoglobin : 14.8 g/dL  Hematocrit : 47.8 %  Platelet Count - Automated : 134 K/uL  Mean Cell Volume : 100.2 fL  Mean Cell Hemoglobin : 31.0 pg  Mean Cell Hemoglobin Concentration : 31.0 %  Auto Neutrophil # : x  Auto Lymphocyte # : x  Auto Monocyte # : x  Auto Eosinophil # : x  Auto Basophil # : x  Auto Neutrophil % : x  Auto Lymphocyte % : x  Auto Monocyte % : x  Auto Eosinophil % : x  Auto Basophil % : x      03-30    141  |  103  |  6<L>  ----------------------------<  83  4.7   |  24  |  0.62    Ca    9.4      30 Mar 2019 05:50  Phos  3.9     03-30  Mg     1.9     03-30    TPro  7.1  /  Alb  3.1<L>  /  TBili  0.3  /  DBili  x   /  AST  33<H>  /  ALT  40<H>  /  AlkPhos  77  03-30    Hemoglobin A1C:     LIVER FUNCTIONS - ( 30 Mar 2019 05:50 )  Alb: 3.1 g/dL / Pro: 7.1 g/dL / ALK PHOS: 77 u/L / ALT: 40 u/L / AST: 33 u/L / GGT: x           Vitamin B12         RADIOLOGY    EKG      IMPRESSION  This is a  year old           schoenberg

## 2019-03-31 NOTE — PROGRESS NOTE ADULT - NSHPATTENDINGPLANDISCUSS_GEN_ALL_CORE
resident
ADS
ADS
patient medical team and neurology.
patient medical team in detail and epilepsy team and neurology.
ADS

## 2019-04-01 ENCOUNTER — TRANSCRIPTION ENCOUNTER (OUTPATIENT)
Age: 54
End: 2019-04-01

## 2019-04-01 PROCEDURE — 99232 SBSQ HOSP IP/OBS MODERATE 35: CPT

## 2019-04-01 RX ADMIN — Medication 81 MILLIGRAM(S): at 13:57

## 2019-04-01 RX ADMIN — DIVALPROEX SODIUM 500 MILLIGRAM(S): 500 TABLET, DELAYED RELEASE ORAL at 05:23

## 2019-04-01 RX ADMIN — DIVALPROEX SODIUM 500 MILLIGRAM(S): 500 TABLET, DELAYED RELEASE ORAL at 13:58

## 2019-04-01 RX ADMIN — Medication 1 MILLIGRAM(S): at 05:24

## 2019-04-01 RX ADMIN — Medication 1 MILLIGRAM(S): at 21:06

## 2019-04-01 RX ADMIN — Medication 325 MILLIGRAM(S): at 13:58

## 2019-04-01 RX ADMIN — Medication 100 MILLIGRAM(S): at 05:25

## 2019-04-01 RX ADMIN — Medication 1 SPRAY(S): at 05:23

## 2019-04-01 RX ADMIN — Medication 1 DROP(S): at 05:23

## 2019-04-01 RX ADMIN — Medication 1 SPRAY(S): at 17:26

## 2019-04-01 RX ADMIN — Medication 1 MILLIGRAM(S): at 13:58

## 2019-04-01 RX ADMIN — LEVETIRACETAM 1000 MILLIGRAM(S): 250 TABLET, FILM COATED ORAL at 21:06

## 2019-04-01 RX ADMIN — Medication 1 MILLIGRAM(S): at 13:57

## 2019-04-01 RX ADMIN — PANTOPRAZOLE SODIUM 40 MILLIGRAM(S): 20 TABLET, DELAYED RELEASE ORAL at 05:23

## 2019-04-01 RX ADMIN — ENOXAPARIN SODIUM 40 MILLIGRAM(S): 100 INJECTION SUBCUTANEOUS at 05:23

## 2019-04-01 RX ADMIN — DIVALPROEX SODIUM 500 MILLIGRAM(S): 500 TABLET, DELAYED RELEASE ORAL at 21:06

## 2019-04-01 RX ADMIN — LEVETIRACETAM 1000 MILLIGRAM(S): 250 TABLET, FILM COATED ORAL at 05:23

## 2019-04-01 RX ADMIN — LEVOFLOXACIN 750 MILLIGRAM(S): 5 INJECTION, SOLUTION INTRAVENOUS at 05:24

## 2019-04-01 RX ADMIN — Medication 100 MILLIGRAM(S): at 21:06

## 2019-04-01 RX ADMIN — Medication 1 DROP(S): at 17:26

## 2019-04-01 RX ADMIN — Medication 1 TABLET(S): at 13:57

## 2019-04-01 RX ADMIN — OLANZAPINE 7.5 MILLIGRAM(S): 15 TABLET, FILM COATED ORAL at 21:06

## 2019-04-01 RX ADMIN — LEVETIRACETAM 1000 MILLIGRAM(S): 250 TABLET, FILM COATED ORAL at 13:58

## 2019-04-01 NOTE — PROGRESS NOTE ADULT - PROBLEM SELECTOR PLAN 1
resolved, infection w/u negative  - appreciate ID input - c/w zosyn for possible aspiration- day #7/7

## 2019-04-01 NOTE — PROGRESS NOTE ADULT - ASSESSMENT
53 y.o. Female w/ hx  CP nonverbal at baseline, seizure disorder admitted for increased seizures, became hypothermic 3/26, treated for likely aspiration pneumonia now completed abx. Ok for D/c to group home today. D/C 40 minutes.

## 2019-04-01 NOTE — DISCHARGE NOTE NURSING/CASE MANAGEMENT/SOCIAL WORK - NSDCDPATPORTLINK_GEN_ALL_CORE
You can access the [facility] Patient Portal, offered by [facility], by registering with the following website: [email or web address]

## 2019-04-01 NOTE — DISCHARGE NOTE NURSING/CASE MANAGEMENT/SOCIAL WORK - NSDCCRNAME_GEN_ALL_CORE_FT
[facility] [address] [facility] [id number] via [email or web address] Ride [phone]

## 2019-04-01 NOTE — PROGRESS NOTE ADULT - SUBJECTIVE AND OBJECTIVE BOX
Patient is a 53y old  Female who presents with a chief complaint of seizures (31 Mar 2019 10:34)      SUBJECTIVE / OVERNIGHT EVENTS:  Patient is nonverbal. No active issues overnight.     MEDICATIONS  (STANDING):  artificial  tears Solution 1 Drop(s) Both EYES two times a day  aspirin  chewable 81 milliGRAM(s) Oral daily  buDESOnide   0.5 milliGRAM(s) Respule 0.5 milliGRAM(s) Inhalation daily  diVALproex  milliGRAM(s) Oral every 8 hours  docusate sodium 100 milliGRAM(s) Oral three times a day  enoxaparin Injectable 40 milliGRAM(s) SubCutaneous every 24 hours  ferrous    sulfate 325 milliGRAM(s) Oral daily  fluticasone propionate 50 MICROgram(s)/spray Nasal Spray 1 Spray(s) Both Nostrils two times a day  folic acid 1 milliGRAM(s) Oral daily  levETIRAcetam 1000 milliGRAM(s) Oral <User Schedule>  LORazepam     Tablet 1 milliGRAM(s) Oral every 8 hours  multivitamin 1 Tablet(s) Oral daily  OLANZapine 7.5 milliGRAM(s) Oral at bedtime  pantoprazole    Tablet 40 milliGRAM(s) Oral before breakfast    MEDICATIONS  (PRN):  LORazepam   Injectable 1 milliGRAM(s) IntraMuscular every 6 hours PRN seizure      Vital Signs Last 24 Hrs  T(C): 36.4 (01 Apr 2019 07:30), Max: 37 (31 Mar 2019 15:30)  T(F): 97.5 (01 Apr 2019 07:30), Max: 98.6 (31 Mar 2019 15:30)  HR: 104 (01 Apr 2019 14:48) (67 - 104)  BP: 119/96 (01 Apr 2019 14:48) (110/68 - 129/77)  BP(mean): --  RR: 18 (01 Apr 2019 14:48) (16 - 18)  SpO2: 99% (01 Apr 2019 14:48) (99% - 100%)  CAPILLARY BLOOD GLUCOSE        I&O's Summary      PHYSICAL EXAM:   GENERAL: NAD, well-developed  HEAD:  Atraumatic, Normocephalic  EYES: EOMI, PERRLA, conjunctiva and sclera clear  NECK: Supple, No JVD  CHEST/LUNG: Clear to auscultation bilaterally; No wheeze  HEART: Regular rate and rhythm; No murmurs, rubs, or gallops  ABDOMEN: Soft, Nontender, Nondistended; Bowel sounds present  EXTREMITIES:  2+ Peripheral Pulses, No clubbing, cyanosis, or edema  PSYCH: AAOx1; cognitively impaired.   NEUROLOGY: non-focal  SKIN: No rashes or lesions    LABS:                    RADIOLOGY & ADDITIONAL TESTS:    Imaging Personally Reviewed:    Consultant(s) Notes Reviewed:      Care Discussed with Consultants/Other Providers:

## 2019-04-02 LAB
APPEARANCE UR: SIGNIFICANT CHANGE UP
BACTERIA # UR AUTO: SIGNIFICANT CHANGE UP
BILIRUB UR-MCNC: NEGATIVE — SIGNIFICANT CHANGE UP
BLOOD UR QL VISUAL: NEGATIVE — SIGNIFICANT CHANGE UP
COLOR SPEC: YELLOW — SIGNIFICANT CHANGE UP
EPI CELLS # UR: SIGNIFICANT CHANGE UP
GLUCOSE UR-MCNC: NEGATIVE — SIGNIFICANT CHANGE UP
HCT VFR BLD CALC: 45.5 % — HIGH (ref 34.5–45)
HGB BLD-MCNC: 14.7 G/DL — SIGNIFICANT CHANGE UP (ref 11.5–15.5)
KETONES UR-MCNC: 150 — SIGNIFICANT CHANGE UP
LEUKOCYTE ESTERASE UR-ACNC: SIGNIFICANT CHANGE UP
MCHC RBC-ENTMCNC: 30.7 PG — SIGNIFICANT CHANGE UP (ref 27–34)
MCHC RBC-ENTMCNC: 32.3 % — SIGNIFICANT CHANGE UP (ref 32–36)
MCV RBC AUTO: 95 FL — SIGNIFICANT CHANGE UP (ref 80–100)
MUCOUS THREADS # UR AUTO: SIGNIFICANT CHANGE UP
NITRITE UR-MCNC: NEGATIVE — SIGNIFICANT CHANGE UP
NRBC # FLD: 0 K/UL — SIGNIFICANT CHANGE UP (ref 0–0)
PH UR: 6 — SIGNIFICANT CHANGE UP (ref 5–8)
PLATELET # BLD AUTO: 169 K/UL — SIGNIFICANT CHANGE UP (ref 150–400)
PMV BLD: 11.7 FL — SIGNIFICANT CHANGE UP (ref 7–13)
PROT UR-MCNC: 100 — HIGH
RBC # BLD: 4.79 M/UL — SIGNIFICANT CHANGE UP (ref 3.8–5.2)
RBC # FLD: 13.8 % — SIGNIFICANT CHANGE UP (ref 10.3–14.5)
SP GR SPEC: 1.04 — SIGNIFICANT CHANGE UP (ref 1–1.04)
UROBILINOGEN FLD QL: SIGNIFICANT CHANGE UP
WBC # BLD: 6.83 K/UL — SIGNIFICANT CHANGE UP (ref 3.8–10.5)
WBC # FLD AUTO: 6.83 K/UL — SIGNIFICANT CHANGE UP (ref 3.8–10.5)
WBC UR QL: SIGNIFICANT CHANGE UP (ref 0–?)
YEAST FLD HPF-#/AREA: SIGNIFICANT CHANGE UP

## 2019-04-02 PROCEDURE — 99233 SBSQ HOSP IP/OBS HIGH 50: CPT

## 2019-04-02 PROCEDURE — 71045 X-RAY EXAM CHEST 1 VIEW: CPT | Mod: 26

## 2019-04-02 RX ORDER — PIPERACILLIN AND TAZOBACTAM 4; .5 G/20ML; G/20ML
3.38 INJECTION, POWDER, LYOPHILIZED, FOR SOLUTION INTRAVENOUS EVERY 8 HOURS
Refills: 0 | Status: DISCONTINUED | OUTPATIENT
Start: 2019-04-02 | End: 2019-04-03

## 2019-04-02 RX ORDER — BACITRACIN ZINC 500 UNIT/G
1 OINTMENT IN PACKET (EA) TOPICAL
Refills: 0 | Status: DISCONTINUED | OUTPATIENT
Start: 2019-04-02 | End: 2019-04-09

## 2019-04-02 RX ADMIN — Medication 1 MILLIGRAM(S): at 13:47

## 2019-04-02 RX ADMIN — LEVETIRACETAM 1000 MILLIGRAM(S): 250 TABLET, FILM COATED ORAL at 13:47

## 2019-04-02 RX ADMIN — Medication 1 MILLIGRAM(S): at 05:11

## 2019-04-02 RX ADMIN — Medication 325 MILLIGRAM(S): at 13:47

## 2019-04-02 RX ADMIN — Medication 1 MILLIGRAM(S): at 13:52

## 2019-04-02 RX ADMIN — Medication 1 APPLICATION(S): at 18:14

## 2019-04-02 RX ADMIN — OLANZAPINE 7.5 MILLIGRAM(S): 15 TABLET, FILM COATED ORAL at 21:07

## 2019-04-02 RX ADMIN — Medication 81 MILLIGRAM(S): at 13:39

## 2019-04-02 RX ADMIN — Medication 100 MILLIGRAM(S): at 21:07

## 2019-04-02 RX ADMIN — Medication 1 DROP(S): at 05:12

## 2019-04-02 RX ADMIN — Medication 100 MILLIGRAM(S): at 05:11

## 2019-04-02 RX ADMIN — Medication 100 MILLIGRAM(S): at 13:47

## 2019-04-02 RX ADMIN — Medication 1 SPRAY(S): at 05:11

## 2019-04-02 RX ADMIN — LEVETIRACETAM 1000 MILLIGRAM(S): 250 TABLET, FILM COATED ORAL at 21:07

## 2019-04-02 RX ADMIN — LEVETIRACETAM 1000 MILLIGRAM(S): 250 TABLET, FILM COATED ORAL at 05:11

## 2019-04-02 RX ADMIN — Medication 1 DROP(S): at 18:13

## 2019-04-02 RX ADMIN — Medication 1 TABLET(S): at 13:47

## 2019-04-02 RX ADMIN — ENOXAPARIN SODIUM 40 MILLIGRAM(S): 100 INJECTION SUBCUTANEOUS at 05:13

## 2019-04-02 RX ADMIN — DIVALPROEX SODIUM 500 MILLIGRAM(S): 500 TABLET, DELAYED RELEASE ORAL at 13:47

## 2019-04-02 RX ADMIN — DIVALPROEX SODIUM 500 MILLIGRAM(S): 500 TABLET, DELAYED RELEASE ORAL at 05:11

## 2019-04-02 RX ADMIN — Medication 1 SPRAY(S): at 18:13

## 2019-04-02 RX ADMIN — PANTOPRAZOLE SODIUM 40 MILLIGRAM(S): 20 TABLET, DELAYED RELEASE ORAL at 05:11

## 2019-04-02 RX ADMIN — Medication 1 MILLIGRAM(S): at 21:07

## 2019-04-02 RX ADMIN — DIVALPROEX SODIUM 500 MILLIGRAM(S): 500 TABLET, DELAYED RELEASE ORAL at 21:07

## 2019-04-02 NOTE — PROGRESS NOTE ADULT - PROBLEM SELECTOR PLAN 1
previous infection w/u negative  s/p zosyn for possible aspiration- day #7/7  repeat Blood Cx  check UA  restart Zosyn

## 2019-04-02 NOTE — PROGRESS NOTE ADULT - SUBJECTIVE AND OBJECTIVE BOX
Patient is a 53y old  Female who presents with a chief complaint of seizures (01 Apr 2019 15:19)      SUBJECTIVE / OVERNIGHT EVENTS:  Patient nonverbal with recurrent hypothermia. Otherwise no active issues.     MEDICATIONS  (STANDING):  artificial  tears Solution 1 Drop(s) Both EYES two times a day  aspirin  chewable 81 milliGRAM(s) Oral daily  BACItracin   Ointment 1 Application(s) Topical two times a day  buDESOnide   0.5 milliGRAM(s) Respule 0.5 milliGRAM(s) Inhalation daily  diVALproex  milliGRAM(s) Oral every 8 hours  docusate sodium 100 milliGRAM(s) Oral three times a day  enoxaparin Injectable 40 milliGRAM(s) SubCutaneous every 24 hours  ferrous    sulfate 325 milliGRAM(s) Oral daily  fluticasone propionate 50 MICROgram(s)/spray Nasal Spray 1 Spray(s) Both Nostrils two times a day  folic acid 1 milliGRAM(s) Oral daily  levETIRAcetam 1000 milliGRAM(s) Oral <User Schedule>  LORazepam     Tablet 1 milliGRAM(s) Oral every 8 hours  multivitamin 1 Tablet(s) Oral daily  OLANZapine 7.5 milliGRAM(s) Oral at bedtime  pantoprazole    Tablet 40 milliGRAM(s) Oral before breakfast    MEDICATIONS  (PRN):  LORazepam   Injectable 1 milliGRAM(s) IntraMuscular every 6 hours PRN seizure      Vital Signs Last 24 Hrs  T(C): 35.6 (02 Apr 2019 07:51), Max: 37.1 (01 Apr 2019 23:39)  T(F): 96.1 (02 Apr 2019 07:51), Max: 98.7 (01 Apr 2019 23:39)  HR: 80 (02 Apr 2019 07:51) (80 - 104)  BP: 104/76 (02 Apr 2019 07:51) (100/67 - 138/92)  BP(mean): --  RR: 17 (02 Apr 2019 07:51) (17 - 19)  SpO2: 97% (02 Apr 2019 07:51) (94% - 99%)  CAPILLARY BLOOD GLUCOSE        I&O's Summary      PHYSICAL EXAM:  GENERAL: NAD, well-developed  HEAD:  Atraumatic, Normocephalic  EYES: EOMI, PERRLA, conjunctiva and sclera clear  NECK: Supple, No JVD  CHEST/LUNG: Clear to auscultation bilaterally; No wheeze  HEART: Regular rate and rhythm; No murmurs, rubs, or gallops  ABDOMEN: Soft, Nontender, Nondistended; Bowel sounds present  EXTREMITIES:  2+ Peripheral Pulses, No clubbing, cyanosis, or edema  PSYCH: AAOx1  NEUROLOGY: non-focal  SKIN: No rashes or lesions    LABS:                        14.7   6.83  )-----------( 169      ( 02 Apr 2019 10:08 )             45.5                     RADIOLOGY & ADDITIONAL TESTS:    Imaging Personally Reviewed:    Consultant(s) Notes Reviewed:      Care Discussed with Consultants/Other Providers:

## 2019-04-02 NOTE — PROGRESS NOTE ADULT - ASSESSMENT
53 y.o. Female w/ hx  CP nonverbal at baseline, seizure disorder admitted for increased seizures, became hypothermic 3/26, treated for likely aspiration pneumonia now completed abx.  D/C to group home cancelled b/c recurrent hypothermia

## 2019-04-03 LAB
ANION GAP SERPL CALC-SCNC: 15 MMO/L — HIGH (ref 7–14)
ANION GAP SERPL CALC-SCNC: 17 MMO/L — HIGH (ref 7–14)
BUN SERPL-MCNC: 17 MG/DL — SIGNIFICANT CHANGE UP (ref 7–23)
BUN SERPL-MCNC: 18 MG/DL — SIGNIFICANT CHANGE UP (ref 7–23)
CALCIUM SERPL-MCNC: 9.1 MG/DL — SIGNIFICANT CHANGE UP (ref 8.4–10.5)
CALCIUM SERPL-MCNC: 9.4 MG/DL — SIGNIFICANT CHANGE UP (ref 8.4–10.5)
CHLORIDE SERPL-SCNC: 100 MMOL/L — SIGNIFICANT CHANGE UP (ref 98–107)
CHLORIDE SERPL-SCNC: 105 MMOL/L — SIGNIFICANT CHANGE UP (ref 98–107)
CO2 SERPL-SCNC: 16 MMOL/L — LOW (ref 22–31)
CO2 SERPL-SCNC: 28 MMOL/L — SIGNIFICANT CHANGE UP (ref 22–31)
CREAT SERPL-MCNC: 0.66 MG/DL — SIGNIFICANT CHANGE UP (ref 0.5–1.3)
CREAT SERPL-MCNC: 0.75 MG/DL — SIGNIFICANT CHANGE UP (ref 0.5–1.3)
GLUCOSE SERPL-MCNC: 154 MG/DL — HIGH (ref 70–99)
GLUCOSE SERPL-MCNC: 60 MG/DL — LOW (ref 70–99)
MAGNESIUM SERPL-MCNC: 1.8 MG/DL — SIGNIFICANT CHANGE UP (ref 1.6–2.6)
PHOSPHATE SERPL-MCNC: 3.5 MG/DL — SIGNIFICANT CHANGE UP (ref 2.5–4.5)
POTASSIUM SERPL-MCNC: 3.3 MMOL/L — LOW (ref 3.5–5.3)
POTASSIUM SERPL-MCNC: SIGNIFICANT CHANGE UP MMOL/L (ref 3.5–5.3)
POTASSIUM SERPL-SCNC: 3.3 MMOL/L — LOW (ref 3.5–5.3)
POTASSIUM SERPL-SCNC: SIGNIFICANT CHANGE UP MMOL/L (ref 3.5–5.3)
SODIUM SERPL-SCNC: 138 MMOL/L — SIGNIFICANT CHANGE UP (ref 135–145)
SODIUM SERPL-SCNC: 143 MMOL/L — SIGNIFICANT CHANGE UP (ref 135–145)
SPECIMEN SOURCE: SIGNIFICANT CHANGE UP

## 2019-04-03 PROCEDURE — 99233 SBSQ HOSP IP/OBS HIGH 50: CPT

## 2019-04-03 RX ORDER — POTASSIUM CHLORIDE 20 MEQ
40 PACKET (EA) ORAL ONCE
Refills: 0 | Status: COMPLETED | OUTPATIENT
Start: 2019-04-03 | End: 2019-04-03

## 2019-04-03 RX ORDER — POTASSIUM CHLORIDE 20 MEQ
40 PACKET (EA) ORAL ONCE
Refills: 0 | Status: DISCONTINUED | OUTPATIENT
Start: 2019-04-03 | End: 2019-04-03

## 2019-04-03 RX ADMIN — Medication 1 SPRAY(S): at 17:40

## 2019-04-03 RX ADMIN — Medication 100 MILLIGRAM(S): at 21:42

## 2019-04-03 RX ADMIN — Medication 1 APPLICATION(S): at 05:08

## 2019-04-03 RX ADMIN — DIVALPROEX SODIUM 500 MILLIGRAM(S): 500 TABLET, DELAYED RELEASE ORAL at 13:57

## 2019-04-03 RX ADMIN — Medication 1 MILLIGRAM(S): at 13:57

## 2019-04-03 RX ADMIN — Medication 1 SPRAY(S): at 05:08

## 2019-04-03 RX ADMIN — PIPERACILLIN AND TAZOBACTAM 25 GRAM(S): 4; .5 INJECTION, POWDER, LYOPHILIZED, FOR SOLUTION INTRAVENOUS at 10:53

## 2019-04-03 RX ADMIN — Medication 1 TABLET(S): at 13:57

## 2019-04-03 RX ADMIN — Medication 1 DROP(S): at 05:08

## 2019-04-03 RX ADMIN — Medication 1 MILLIGRAM(S): at 21:42

## 2019-04-03 RX ADMIN — Medication 40 MILLIEQUIVALENT(S): at 18:45

## 2019-04-03 RX ADMIN — LEVETIRACETAM 1000 MILLIGRAM(S): 250 TABLET, FILM COATED ORAL at 05:08

## 2019-04-03 RX ADMIN — Medication 0.5 MILLIGRAM(S): at 10:55

## 2019-04-03 RX ADMIN — Medication 1 DROP(S): at 17:39

## 2019-04-03 RX ADMIN — Medication 1 MILLIGRAM(S): at 05:14

## 2019-04-03 RX ADMIN — OLANZAPINE 7.5 MILLIGRAM(S): 15 TABLET, FILM COATED ORAL at 21:42

## 2019-04-03 RX ADMIN — Medication 1 APPLICATION(S): at 17:39

## 2019-04-03 RX ADMIN — Medication 100 MILLIGRAM(S): at 13:56

## 2019-04-03 RX ADMIN — Medication 100 MILLIGRAM(S): at 05:08

## 2019-04-03 RX ADMIN — Medication 1 TABLET(S): at 18:46

## 2019-04-03 RX ADMIN — LEVETIRACETAM 1000 MILLIGRAM(S): 250 TABLET, FILM COATED ORAL at 13:56

## 2019-04-03 RX ADMIN — LEVETIRACETAM 1000 MILLIGRAM(S): 250 TABLET, FILM COATED ORAL at 21:41

## 2019-04-03 RX ADMIN — Medication 81 MILLIGRAM(S): at 13:56

## 2019-04-03 RX ADMIN — ENOXAPARIN SODIUM 40 MILLIGRAM(S): 100 INJECTION SUBCUTANEOUS at 05:14

## 2019-04-03 RX ADMIN — PIPERACILLIN AND TAZOBACTAM 25 GRAM(S): 4; .5 INJECTION, POWDER, LYOPHILIZED, FOR SOLUTION INTRAVENOUS at 00:00

## 2019-04-03 RX ADMIN — DIVALPROEX SODIUM 500 MILLIGRAM(S): 500 TABLET, DELAYED RELEASE ORAL at 21:42

## 2019-04-03 RX ADMIN — DIVALPROEX SODIUM 500 MILLIGRAM(S): 500 TABLET, DELAYED RELEASE ORAL at 05:08

## 2019-04-03 RX ADMIN — PANTOPRAZOLE SODIUM 40 MILLIGRAM(S): 20 TABLET, DELAYED RELEASE ORAL at 05:08

## 2019-04-03 RX ADMIN — Medication 325 MILLIGRAM(S): at 13:57

## 2019-04-03 NOTE — PROGRESS NOTE ADULT - PROBLEM SELECTOR PLAN 1
previous infection w/u negative  s/p zosyn for possible aspiration- day #7/7  repeat Blood Cx so far negative  UA negative  restarted Zosyn on 4/2 now hypothermia improving.   F/U CXR results and monitor Blood CX for the next 48 hours.

## 2019-04-03 NOTE — PROGRESS NOTE ADULT - SUBJECTIVE AND OBJECTIVE BOX
Patient is a 53y old  Female who presents with a chief complaint of seizures (2019 14:10)      SUBJECTIVE / OVERNIGHT EVENTS:  Patient is nonverbal. No active issues overnight.     MEDICATIONS  (STANDING):  artificial  tears Solution 1 Drop(s) Both EYES two times a day  aspirin  chewable 81 milliGRAM(s) Oral daily  BACItracin   Ointment 1 Application(s) Topical two times a day  buDESOnide   0.5 milliGRAM(s) Respule 0.5 milliGRAM(s) Inhalation daily  diVALproex  milliGRAM(s) Oral every 8 hours  docusate sodium 100 milliGRAM(s) Oral three times a day  enoxaparin Injectable 40 milliGRAM(s) SubCutaneous every 24 hours  ferrous    sulfate 325 milliGRAM(s) Oral daily  fluticasone propionate 50 MICROgram(s)/spray Nasal Spray 1 Spray(s) Both Nostrils two times a day  folic acid 1 milliGRAM(s) Oral daily  levETIRAcetam 1000 milliGRAM(s) Oral <User Schedule>  LORazepam     Tablet 1 milliGRAM(s) Oral every 8 hours  multivitamin 1 Tablet(s) Oral daily  OLANZapine 7.5 milliGRAM(s) Oral at bedtime  pantoprazole    Tablet 40 milliGRAM(s) Oral before breakfast  piperacillin/tazobactam IVPB. 3.375 Gram(s) IV Intermittent every 8 hours    MEDICATIONS  (PRN):  LORazepam   Injectable 1 milliGRAM(s) IntraMuscular every 6 hours PRN seizure      Vital Signs Last 24 Hrs  T(C): 36.4 (2019 07:52), Max: 37.2 (2019 23:52)  T(F): 97.6 (2019 07:52), Max: 98.9 (2019 23:52)  HR: 78 (2019 07:52) (78 - 110)  BP: 105/70 (2019 07:52) (100/60 - 116/77)  BP(mean): --  RR: 17 (2019 07:52) (17 - 18)  SpO2: 98% (2019 07:52) (97% - 100%)  CAPILLARY BLOOD GLUCOSE        I&O's Summary      PHYSICAL EXAM:  GENERAL: NAD, well-developed  HEAD:  Atraumatic, Normocephalic  EYES: EOMI, PERRLA, conjunctiva and sclera clear  NECK: Supple, No JVD  CHEST/LUNG: Clear to auscultation bilaterally; No wheeze  HEART: Regular rate and rhythm; No murmurs, rubs, or gallops  ABDOMEN: Soft, Nontender, Nondistended; Bowel sounds present  EXTREMITIES:  contracted. Right forarm blisters from tape. 2+ Peripheral Pulses, No clubbing, cyanosis, or edema  PSYCH: AAOx1  NEUROLOGY: non-focal  SKIN: No rashes or lesions    LABS:                        14.7   6.83  )-----------( 169      ( 2019 10:08 )             45.5     04-03    138  |  105  |  17  ----------------------------<  60<L>  Test not performed SPECIMEN SEVERELY HEMOLYZED   |  16<L>  |  0.66    Ca    9.1      2019 06:20            Urinalysis Basic - ( 2019 14:20 )    Color: YELLOW / Appearance: HAZY / S.036 / pH: 6.0  Gluc: NEGATIVE / Ketone: 150  / Bili: NEGATIVE / Urobili: TRACE   Blood: NEGATIVE / Protein: 100 / Nitrite: NEGATIVE   Leuk Esterase: SMALL / RBC: x / WBC 10-20   Sq Epi: x / Non Sq Epi: MODERATE / Bacteria: FEW        RADIOLOGY & ADDITIONAL TESTS:    Imaging Personally Reviewed:     Consultant(s) Notes Reviewed:      Care Discussed with Consultants/Other Providers:

## 2019-04-04 LAB
ANION GAP SERPL CALC-SCNC: 12 MMO/L — SIGNIFICANT CHANGE UP (ref 7–14)
BUN SERPL-MCNC: 19 MG/DL — SIGNIFICANT CHANGE UP (ref 7–23)
CALCIUM SERPL-MCNC: 9.5 MG/DL — SIGNIFICANT CHANGE UP (ref 8.4–10.5)
CHLORIDE SERPL-SCNC: 107 MMOL/L — SIGNIFICANT CHANGE UP (ref 98–107)
CO2 SERPL-SCNC: 22 MMOL/L — SIGNIFICANT CHANGE UP (ref 22–31)
CORTIS SERPL-MCNC: 12.5 UG/DL — SIGNIFICANT CHANGE UP (ref 2.7–18.4)
CREAT SERPL-MCNC: 0.57 MG/DL — SIGNIFICANT CHANGE UP (ref 0.5–1.3)
GLUCOSE SERPL-MCNC: 83 MG/DL — SIGNIFICANT CHANGE UP (ref 70–99)
HCG SERPL-ACNC: 5.35 MIU/ML — SIGNIFICANT CHANGE UP
HCT VFR BLD CALC: 49.4 % — HIGH (ref 34.5–45)
HGB BLD-MCNC: 15.6 G/DL — HIGH (ref 11.5–15.5)
MAGNESIUM SERPL-MCNC: 2 MG/DL — SIGNIFICANT CHANGE UP (ref 1.6–2.6)
MCHC RBC-ENTMCNC: 31.3 PG — SIGNIFICANT CHANGE UP (ref 27–34)
MCHC RBC-ENTMCNC: 31.6 % — LOW (ref 32–36)
MCV RBC AUTO: 99.2 FL — SIGNIFICANT CHANGE UP (ref 80–100)
NRBC # FLD: 0 K/UL — SIGNIFICANT CHANGE UP (ref 0–0)
PHOSPHATE SERPL-MCNC: 3.7 MG/DL — SIGNIFICANT CHANGE UP (ref 2.5–4.5)
PLATELET # BLD AUTO: 169 K/UL — SIGNIFICANT CHANGE UP (ref 150–400)
PMV BLD: 12.2 FL — SIGNIFICANT CHANGE UP (ref 7–13)
POTASSIUM SERPL-MCNC: 3.7 MMOL/L — SIGNIFICANT CHANGE UP (ref 3.5–5.3)
POTASSIUM SERPL-MCNC: 5.9 MMOL/L — HIGH (ref 3.5–5.3)
POTASSIUM SERPL-SCNC: 3.7 MMOL/L — SIGNIFICANT CHANGE UP (ref 3.5–5.3)
POTASSIUM SERPL-SCNC: 5.9 MMOL/L — HIGH (ref 3.5–5.3)
RBC # BLD: 4.98 M/UL — SIGNIFICANT CHANGE UP (ref 3.8–5.2)
RBC # FLD: 14.4 % — SIGNIFICANT CHANGE UP (ref 10.3–14.5)
SODIUM SERPL-SCNC: 141 MMOL/L — SIGNIFICANT CHANGE UP (ref 135–145)
WBC # BLD: 7.15 K/UL — SIGNIFICANT CHANGE UP (ref 3.8–10.5)
WBC # FLD AUTO: 7.15 K/UL — SIGNIFICANT CHANGE UP (ref 3.8–10.5)

## 2019-04-04 PROCEDURE — 99233 SBSQ HOSP IP/OBS HIGH 50: CPT

## 2019-04-04 RX ADMIN — Medication 1 APPLICATION(S): at 05:17

## 2019-04-04 RX ADMIN — Medication 1 MILLIGRAM(S): at 12:31

## 2019-04-04 RX ADMIN — ENOXAPARIN SODIUM 40 MILLIGRAM(S): 100 INJECTION SUBCUTANEOUS at 05:26

## 2019-04-04 RX ADMIN — Medication 0.5 MILLIGRAM(S): at 08:26

## 2019-04-04 RX ADMIN — Medication 1 SPRAY(S): at 17:36

## 2019-04-04 RX ADMIN — DIVALPROEX SODIUM 500 MILLIGRAM(S): 500 TABLET, DELAYED RELEASE ORAL at 12:17

## 2019-04-04 RX ADMIN — LEVETIRACETAM 1000 MILLIGRAM(S): 250 TABLET, FILM COATED ORAL at 05:17

## 2019-04-04 RX ADMIN — Medication 1 MILLIGRAM(S): at 21:35

## 2019-04-04 RX ADMIN — LEVETIRACETAM 1000 MILLIGRAM(S): 250 TABLET, FILM COATED ORAL at 21:35

## 2019-04-04 RX ADMIN — LEVETIRACETAM 1000 MILLIGRAM(S): 250 TABLET, FILM COATED ORAL at 12:17

## 2019-04-04 RX ADMIN — Medication 1 MILLIGRAM(S): at 12:16

## 2019-04-04 RX ADMIN — Medication 1 TABLET(S): at 12:17

## 2019-04-04 RX ADMIN — Medication 1 TABLET(S): at 17:37

## 2019-04-04 RX ADMIN — Medication 1 MILLIGRAM(S): at 05:17

## 2019-04-04 RX ADMIN — Medication 100 MILLIGRAM(S): at 12:18

## 2019-04-04 RX ADMIN — Medication 1 TABLET(S): at 05:17

## 2019-04-04 RX ADMIN — Medication 81 MILLIGRAM(S): at 12:16

## 2019-04-04 RX ADMIN — Medication 325 MILLIGRAM(S): at 12:16

## 2019-04-04 RX ADMIN — Medication 1 APPLICATION(S): at 17:36

## 2019-04-04 RX ADMIN — DIVALPROEX SODIUM 500 MILLIGRAM(S): 500 TABLET, DELAYED RELEASE ORAL at 21:35

## 2019-04-04 RX ADMIN — Medication 1 DROP(S): at 05:18

## 2019-04-04 RX ADMIN — PANTOPRAZOLE SODIUM 40 MILLIGRAM(S): 20 TABLET, DELAYED RELEASE ORAL at 05:17

## 2019-04-04 RX ADMIN — Medication 1 SPRAY(S): at 05:17

## 2019-04-04 RX ADMIN — Medication 100 MILLIGRAM(S): at 05:17

## 2019-04-04 RX ADMIN — DIVALPROEX SODIUM 500 MILLIGRAM(S): 500 TABLET, DELAYED RELEASE ORAL at 05:17

## 2019-04-04 RX ADMIN — OLANZAPINE 7.5 MILLIGRAM(S): 15 TABLET, FILM COATED ORAL at 21:36

## 2019-04-04 RX ADMIN — Medication 1 DROP(S): at 17:36

## 2019-04-04 NOTE — PROGRESS NOTE ADULT - PROBLEM SELECTOR PLAN 1
previous infection w/u negative  s/p zosyn for possible aspiration- day #7/7  repeat Blood Cx so far negative  UA negative  restarted Zosyn on 4/2 now hypothermia improving. Patient currently on augmentin cause lost IV  CXR negative for PNA  repeat Blood Cx NTD  check CT A/P to rule out occult abscess; If negative hypothermia most likely central  check a.m. cortisol levels.

## 2019-04-05 DIAGNOSIS — E87.0 HYPEROSMOLALITY AND HYPERNATREMIA: ICD-10-CM

## 2019-04-05 LAB
ANION GAP SERPL CALC-SCNC: 16 MMO/L — HIGH (ref 7–14)
BASOPHILS # BLD AUTO: 0.04 K/UL — SIGNIFICANT CHANGE UP (ref 0–0.2)
BASOPHILS NFR BLD AUTO: 0.5 % — SIGNIFICANT CHANGE UP (ref 0–2)
BUN SERPL-MCNC: 18 MG/DL — SIGNIFICANT CHANGE UP (ref 7–23)
CALCIUM SERPL-MCNC: 9.4 MG/DL — SIGNIFICANT CHANGE UP (ref 8.4–10.5)
CHLORIDE SERPL-SCNC: 105 MMOL/L — SIGNIFICANT CHANGE UP (ref 98–107)
CO2 SERPL-SCNC: 28 MMOL/L — SIGNIFICANT CHANGE UP (ref 22–31)
CREAT SERPL-MCNC: 0.5 MG/DL — SIGNIFICANT CHANGE UP (ref 0.5–1.3)
EOSINOPHIL # BLD AUTO: 0.36 K/UL — SIGNIFICANT CHANGE UP (ref 0–0.5)
EOSINOPHIL NFR BLD AUTO: 4.7 % — SIGNIFICANT CHANGE UP (ref 0–6)
GLUCOSE SERPL-MCNC: 122 MG/DL — HIGH (ref 70–99)
HCT VFR BLD CALC: 46.1 % — HIGH (ref 34.5–45)
HGB BLD-MCNC: 14.3 G/DL — SIGNIFICANT CHANGE UP (ref 11.5–15.5)
IMM GRANULOCYTES NFR BLD AUTO: 1 % — SIGNIFICANT CHANGE UP (ref 0–1.5)
LYMPHOCYTES # BLD AUTO: 2.21 K/UL — SIGNIFICANT CHANGE UP (ref 1–3.3)
LYMPHOCYTES # BLD AUTO: 28.6 % — SIGNIFICANT CHANGE UP (ref 13–44)
MAGNESIUM SERPL-MCNC: 1.8 MG/DL — SIGNIFICANT CHANGE UP (ref 1.6–2.6)
MCHC RBC-ENTMCNC: 30.8 PG — SIGNIFICANT CHANGE UP (ref 27–34)
MCHC RBC-ENTMCNC: 31 % — LOW (ref 32–36)
MCV RBC AUTO: 99.4 FL — SIGNIFICANT CHANGE UP (ref 80–100)
MONOCYTES # BLD AUTO: 0.79 K/UL — SIGNIFICANT CHANGE UP (ref 0–0.9)
MONOCYTES NFR BLD AUTO: 10.2 % — SIGNIFICANT CHANGE UP (ref 2–14)
NEUTROPHILS # BLD AUTO: 4.25 K/UL — SIGNIFICANT CHANGE UP (ref 1.8–7.4)
NEUTROPHILS NFR BLD AUTO: 55 % — SIGNIFICANT CHANGE UP (ref 43–77)
NRBC # FLD: 0 K/UL — SIGNIFICANT CHANGE UP (ref 0–0)
PHOSPHATE SERPL-MCNC: 3.4 MG/DL — SIGNIFICANT CHANGE UP (ref 2.5–4.5)
PLATELET # BLD AUTO: 130 K/UL — LOW (ref 150–400)
PMV BLD: 11.9 FL — SIGNIFICANT CHANGE UP (ref 7–13)
POTASSIUM SERPL-MCNC: 3.6 MMOL/L — SIGNIFICANT CHANGE UP (ref 3.5–5.3)
POTASSIUM SERPL-SCNC: 3.6 MMOL/L — SIGNIFICANT CHANGE UP (ref 3.5–5.3)
RBC # BLD: 4.64 M/UL — SIGNIFICANT CHANGE UP (ref 3.8–5.2)
RBC # FLD: 14.3 % — SIGNIFICANT CHANGE UP (ref 10.3–14.5)
SODIUM SERPL-SCNC: 149 MMOL/L — HIGH (ref 135–145)
WBC # BLD: 7.73 K/UL — SIGNIFICANT CHANGE UP (ref 3.8–10.5)
WBC # FLD AUTO: 7.73 K/UL — SIGNIFICANT CHANGE UP (ref 3.8–10.5)

## 2019-04-05 PROCEDURE — 99233 SBSQ HOSP IP/OBS HIGH 50: CPT

## 2019-04-05 PROCEDURE — 74177 CT ABD & PELVIS W/CONTRAST: CPT | Mod: 26

## 2019-04-05 RX ORDER — SODIUM CHLORIDE 9 MG/ML
1000 INJECTION, SOLUTION INTRAVENOUS
Refills: 0 | Status: DISCONTINUED | OUTPATIENT
Start: 2019-04-05 | End: 2019-04-07

## 2019-04-05 RX ADMIN — LEVETIRACETAM 1000 MILLIGRAM(S): 250 TABLET, FILM COATED ORAL at 06:36

## 2019-04-05 RX ADMIN — Medication 1 APPLICATION(S): at 06:36

## 2019-04-05 RX ADMIN — Medication 100 MILLIGRAM(S): at 21:29

## 2019-04-05 RX ADMIN — Medication 0.5 MILLIGRAM(S): at 09:41

## 2019-04-05 RX ADMIN — OLANZAPINE 7.5 MILLIGRAM(S): 15 TABLET, FILM COATED ORAL at 21:29

## 2019-04-05 RX ADMIN — Medication 1 DROP(S): at 06:37

## 2019-04-05 RX ADMIN — Medication 325 MILLIGRAM(S): at 13:07

## 2019-04-05 RX ADMIN — LEVETIRACETAM 1000 MILLIGRAM(S): 250 TABLET, FILM COATED ORAL at 13:07

## 2019-04-05 RX ADMIN — Medication 1 MILLIGRAM(S): at 06:36

## 2019-04-05 RX ADMIN — Medication 1 TABLET(S): at 13:07

## 2019-04-05 RX ADMIN — Medication 1 DROP(S): at 17:25

## 2019-04-05 RX ADMIN — SODIUM CHLORIDE 50 MILLILITER(S): 9 INJECTION, SOLUTION INTRAVENOUS at 14:27

## 2019-04-05 RX ADMIN — Medication 1 MILLIGRAM(S): at 13:07

## 2019-04-05 RX ADMIN — PANTOPRAZOLE SODIUM 40 MILLIGRAM(S): 20 TABLET, DELAYED RELEASE ORAL at 06:36

## 2019-04-05 RX ADMIN — DIVALPROEX SODIUM 500 MILLIGRAM(S): 500 TABLET, DELAYED RELEASE ORAL at 21:29

## 2019-04-05 RX ADMIN — Medication 100 MILLIGRAM(S): at 13:07

## 2019-04-05 RX ADMIN — Medication 1 SPRAY(S): at 06:36

## 2019-04-05 RX ADMIN — LEVETIRACETAM 1000 MILLIGRAM(S): 250 TABLET, FILM COATED ORAL at 21:29

## 2019-04-05 RX ADMIN — DIVALPROEX SODIUM 500 MILLIGRAM(S): 500 TABLET, DELAYED RELEASE ORAL at 13:07

## 2019-04-05 RX ADMIN — Medication 1 APPLICATION(S): at 17:25

## 2019-04-05 RX ADMIN — Medication 1 MILLIGRAM(S): at 21:29

## 2019-04-05 RX ADMIN — DIVALPROEX SODIUM 500 MILLIGRAM(S): 500 TABLET, DELAYED RELEASE ORAL at 06:36

## 2019-04-05 RX ADMIN — ENOXAPARIN SODIUM 40 MILLIGRAM(S): 100 INJECTION SUBCUTANEOUS at 06:36

## 2019-04-05 RX ADMIN — Medication 100 MILLIGRAM(S): at 06:36

## 2019-04-05 RX ADMIN — Medication 1 TABLET(S): at 17:25

## 2019-04-05 RX ADMIN — Medication 81 MILLIGRAM(S): at 13:07

## 2019-04-05 RX ADMIN — Medication 1 SPRAY(S): at 17:25

## 2019-04-05 RX ADMIN — Medication 1 TABLET(S): at 06:36

## 2019-04-05 NOTE — PROGRESS NOTE ADULT - ASSESSMENT
53 y.o. Female w/ hx  CP nonverbal at baseline, seizure disorder admitted for increased seizures, became hypothermic 3/26, treated for likely aspiration pneumonia now completed abx.  D/C to group home cancelled b/c recurrent hypothermia. So Far sepsis W/U has been negative. Suspect central hypothermia. Probable d/c to group home on 4/8.

## 2019-04-05 NOTE — CHART NOTE - NSCHARTNOTEFT_GEN_A_CORE
Discharge Medication reconciliation and follow up reviewed with Medical Attending and confirmed before discharge.
EEG Impression/Clinical Correlate:  1.There were numerous electrographic seizures captured with diffuse onset, three with right anterior onset that had grunting sounds with head turn to the left  2.Risk for seizures from the bilateral anterior head regions   3.Risk for generalized seizures activity  4.Moderate nonspecific diffuse or multifocal cerebral dysfunction    These findings can be seen in patients with multifocal or symptomatic generalized epilepsy.      Above EEG results reviewed with epilepsy attending. Patient has had numerous electrographic and clinical seizures over the past day. Her depakote level is currently subtherapeutic at 38.7 this morning.    Will load additional 750 mg IV depakote at this time and also start standing ativan for now to reduce the seizure activity she is having (ativan 1 mg IV TID for now).     Continue depakote 500 mg BID, continue Keppra 1500 mg BID, continue to wean off Vimpat. Recheck depakote level in the AM. Orders placed.     Plan discussed with epilepsy attending.
EEG findings discussed with Epilepsy Fellow. Patient continues to have seizures including 3 Tonic seizures w/ EEG correlate.     Recommendations:   [] Increase Depakote to 500mg Q8H  [] Increase Keppra to 1g Q8H    Plan discussed with Epilepsy Fellow.
MEDICINE PA NOTE     Case discussed with patient's mother, Alia Ponce who consented for CT AP with IV contrast. Consent filled out and placed in chart. Will continue to monitor.     ADDENDUM 4/5/2019; 13:15   Patient noted with hypernatremia second to poor fluid intake. Will start patient on 1/2 NS x 10 hours and monitor Na. Will continue to monitor.
MEDICINE PA NOTE     Informed by nurse of patient with hypothermia to 91.0 rectally. No labs drawn this AM second to refusal. Patient examined by bedside with no physical examination findings. Chest XRAY noted to be negative. Labs ordered STAT. BCx drawn STAT. UCx and UA pending. Zosyn started empirically. Will continue to monitor patient. Vitals Q4H.
NUTRITION SERVICES     Upon Nutritional Assessment by the Registered Dietitian your patient was determined to meet criteria/ has evidence of the following diagnosis/diagnoses:  [ ] Mild Protein Calorie Malnutrition   [ ] Moderate Protein Calorie Malnutrition   [x ] Severe Protein Calorie Malnutrition   [ ] Unspecified Protein Calorie Malnutrition   [ ] Underweight / BMI <19  [ ] Morbid Obesity / BMI >40    Findings as based on:  •  Comprehensive nutritional assessment and consultation    Please refer to Initial Dietitian Evaluation via documents section of [employer] EMR for further recommendations.
MAR called for seizure    53F pmh CP (nonverbal at baseline) and seizure disorder admitted for increased seizures. Rapid called for possible seizures. Upon arrival, patient mouth bilateral twitching, pupils reactive to light, no jerky movements noted. Prior to rapid, patient received 2mg ativan x 1 at 9:15AM and being loaded with vimpat. Patient received another 2mg IV ativan during rapid. Patient already on keppra and vimpat. Neurology at bedside, assessed patient. Neurology believes patient is in post-ictal state now, not active seizure. Vitals stable. Rapid ended.    Plan:  -Primary team and neurology at bedside to resume further care  -f/u neurology recs  -f.u spot eeg    MAR  [id number]
MEDICINE PA NOTE    IV access lost. Patient currently on Zosyn empirically for Hypothermia without source. Temperature curve asymptomatic so far. Continue on PO Augmentin BID for now. Monitor closely. Case discussed with PMD who agrees with the above plan.
MEDICINE PA NOTE    Informed by nurse during rounds of patient with seizures. Patient seen by bedside with attending and PA Student noted with facial (BL) and torso jerking movement with AMS (baseline AOx0 but awake and interactive). Patient given Ativan 2mg IV x 1 dose with no response. Case discussed with Neurology by bedside and Depakote load 15mg/kg (1080mg) load recommended and then starting Depakote 500mg IV/ PO BID to current Keppra 1500mg PO BID and Vimpat 100mg PO BID regimen. Patient noted not to take medications (poor compliance) and likely cause of breakthrough seizures. Seizure medications changed to IV. Status Epilepticus concerns noted second to change in mentation and no break in seizure from initial Ativan dose. Ativan 2mg IV x 1 more dose given. RRT called. No status epilepticus noted. Post ictal phase currently. Will continue to monitor patient.
Notified by Nurse at 8pm, pt removed EEG leads 4:30pm, provider was not notified at the time.   Pt on enhanced supervision. Please f/u in AM.
Writer was notified by RN that patient was hypothermic with a rectal temp of 94.4 and was also informed of dry scattered scabs and small blisters to the right upper arm.  Pt was placed on the hypothermic blanket. Will continue to monitor.

## 2019-04-05 NOTE — PROGRESS NOTE ADULT - PROBLEM SELECTOR PLAN 1
previous infection w/u negative  s/p zosyn for possible aspiration- day #7/7  restarted Zosyn on 4/2 now hypothermia improving. Patient currently on augmentin cause lost IV  repeat Blood Cx so far negative  UA negative  CXR negative for PNA  repeat Blood Cx NTD  CT A/P negative; hypothermia most likely central  a.m. cortisol WNLs.  monitoring temperatures.

## 2019-04-05 NOTE — PROGRESS NOTE ADULT - SUBJECTIVE AND OBJECTIVE BOX
Patient is a 53y old  Female who presents with a chief complaint of seizures (04 Apr 2019 10:15)      SUBJECTIVE / OVERNIGHT EVENTS:  Patient has no new complaints. Denies cp, SOB, abdominal pain, N/V/D     MEDICATIONS  (STANDING):  amoxicillin  875 milliGRAM(s)/clavulanate 1 Tablet(s) Oral two times a day  artificial  tears Solution 1 Drop(s) Both EYES two times a day  aspirin  chewable 81 milliGRAM(s) Oral daily  BACItracin   Ointment 1 Application(s) Topical two times a day  buDESOnide   0.5 milliGRAM(s) Respule 0.5 milliGRAM(s) Inhalation daily  diVALproex  milliGRAM(s) Oral every 8 hours  docusate sodium 100 milliGRAM(s) Oral three times a day  enoxaparin Injectable 40 milliGRAM(s) SubCutaneous every 24 hours  ferrous    sulfate 325 milliGRAM(s) Oral daily  fluticasone propionate 50 MICROgram(s)/spray Nasal Spray 1 Spray(s) Both Nostrils two times a day  folic acid 1 milliGRAM(s) Oral daily  levETIRAcetam 1000 milliGRAM(s) Oral <User Schedule>  LORazepam     Tablet 1 milliGRAM(s) Oral every 8 hours  multivitamin 1 Tablet(s) Oral daily  OLANZapine 7.5 milliGRAM(s) Oral at bedtime  pantoprazole    Tablet 40 milliGRAM(s) Oral before breakfast  sodium chloride 0.45%. 1000 milliLiter(s) (50 mL/Hr) IV Continuous <Continuous>    MEDICATIONS  (PRN):  LORazepam   Injectable 1 milliGRAM(s) IntraMuscular every 6 hours PRN seizure      Vital Signs Last 24 Hrs  T(C): 36.2 (05 Apr 2019 12:56), Max: 36.3 (04 Apr 2019 20:23)  T(F): 97.2 (05 Apr 2019 12:56), Max: 97.4 (04 Apr 2019 20:23)  HR: 86 (05 Apr 2019 12:56) (74 - 115)  BP: 135/95 (05 Apr 2019 12:56) (104/58 - 135/95)  BP(mean): --  RR: 18 (05 Apr 2019 12:56) (16 - 19)  SpO2: 97% (05 Apr 2019 12:56) (95% - 100%)  CAPILLARY BLOOD GLUCOSE        I&O's Summary      PHYSICAL EXAM:  GENERAL: NAD, well-developed  HEAD:  Atraumatic, Normocephalic  EYES: EOMI, PERRLA, conjunctiva and sclera clear  NECK: Supple, No JVD  CHEST/LUNG: Clear to auscultation bilaterally; No wheeze  HEART: Regular rate and rhythm; No murmurs, rubs, or gallops  ABDOMEN: Soft, Nontender, Nondistended; Bowel sounds present  EXTREMITIES: contracted. Right forarm blisters from tape. 2+ Peripheral Pulses, No clubbing, cyanosis, or edema  PSYCH: AAOx0; nonverbal   NEUROLOGY: non-focal  SKIN: No rashes or lesions    LABS:                        14.3   7.73  )-----------( 130      ( 05 Apr 2019 11:14 )             46.1     04-05    149<H>  |  105  |  18  ----------------------------<  122<H>  3.6   |  28  |  0.50    Ca    9.4      05 Apr 2019 11:14  Phos  3.4     04-05  Mg     1.8     04-05                RADIOLOGY & ADDITIONAL TESTS:    Imaging Personally Reviewed: < from: CT Abdomen and Pelvis w/ IV Cont (04.05.19 @ 12:34) >  IMPRESSION: No source of infection in the abdomen or pelvis.      < end of copied text >      Consultant(s) Notes Reviewed:      Care Discussed with Consultants/Other Providers:

## 2019-04-05 NOTE — PROGRESS NOTE ADULT - PROBLEM SELECTOR PLAN 3
minimally verbal at baseline  diet chopped to 1/4 inch pieces, aspiration precautions seen by neuro, AEDs adjusted - weaned off Vimpat, c/w depakote 500 q8, Keppra 1000 q8,  Ativan 1q8  pt tolerating diet chopped to 1/4 inch pieces, meds in applesauce and is now taking seizure meds

## 2019-04-05 NOTE — PROGRESS NOTE ADULT - PROBLEM SELECTOR PLAN 4
assist with all ADLs due to CP minimally verbal at baseline  diet chopped to 1/4 inch pieces, aspiration precautions

## 2019-04-05 NOTE — PROGRESS NOTE ADULT - PROBLEM SELECTOR PLAN 2
seen by neuro, AEDs adjusted - weaned off Vimpat, c/w depakote 500 q8, Keppra 1000 q8,  Ativan 1q8  pt tolerating diet chopped to 1/4 inch pieces, meds in applesauce and is now taking seizure meds due to poor intake of water intermittently.   will give 1/2 NS IVFs  Monitoring Na

## 2019-04-05 NOTE — CHART NOTE - NSCHARTNOTESELECT_GEN_ALL_CORE
ADS note/Event Note
Event Note
Event Note
MAR/Rapid Response
ads/Event Note
Event Note
Malnutrition Alert/Nutrition Services
Neurology/Event Note
Neurology/Event Note

## 2019-04-06 LAB
ANION GAP SERPL CALC-SCNC: 15 MMO/L — HIGH (ref 7–14)
BUN SERPL-MCNC: 17 MG/DL — SIGNIFICANT CHANGE UP (ref 7–23)
CALCIUM SERPL-MCNC: 9.5 MG/DL — SIGNIFICANT CHANGE UP (ref 8.4–10.5)
CHLORIDE SERPL-SCNC: 106 MMOL/L — SIGNIFICANT CHANGE UP (ref 98–107)
CO2 SERPL-SCNC: 25 MMOL/L — SIGNIFICANT CHANGE UP (ref 22–31)
CREAT SERPL-MCNC: 0.52 MG/DL — SIGNIFICANT CHANGE UP (ref 0.5–1.3)
GLUCOSE BLDC GLUCOMTR-MCNC: 106 MG/DL — HIGH (ref 70–99)
GLUCOSE BLDC GLUCOMTR-MCNC: 76 MG/DL — SIGNIFICANT CHANGE UP (ref 70–99)
GLUCOSE SERPL-MCNC: 62 MG/DL — LOW (ref 70–99)
POTASSIUM SERPL-MCNC: 4.3 MMOL/L — SIGNIFICANT CHANGE UP (ref 3.5–5.3)
POTASSIUM SERPL-SCNC: 4.3 MMOL/L — SIGNIFICANT CHANGE UP (ref 3.5–5.3)
SODIUM SERPL-SCNC: 146 MMOL/L — HIGH (ref 135–145)

## 2019-04-06 PROCEDURE — 99233 SBSQ HOSP IP/OBS HIGH 50: CPT

## 2019-04-06 RX ADMIN — Medication 100 MILLIGRAM(S): at 05:30

## 2019-04-06 RX ADMIN — Medication 1 MILLIGRAM(S): at 22:14

## 2019-04-06 RX ADMIN — DIVALPROEX SODIUM 500 MILLIGRAM(S): 500 TABLET, DELAYED RELEASE ORAL at 22:14

## 2019-04-06 RX ADMIN — Medication 1 TABLET(S): at 13:48

## 2019-04-06 RX ADMIN — Medication 1 APPLICATION(S): at 05:30

## 2019-04-06 RX ADMIN — Medication 1 MILLIGRAM(S): at 13:47

## 2019-04-06 RX ADMIN — Medication 1 APPLICATION(S): at 17:01

## 2019-04-06 RX ADMIN — PANTOPRAZOLE SODIUM 40 MILLIGRAM(S): 20 TABLET, DELAYED RELEASE ORAL at 05:33

## 2019-04-06 RX ADMIN — Medication 1 SPRAY(S): at 05:30

## 2019-04-06 RX ADMIN — Medication 81 MILLIGRAM(S): at 13:46

## 2019-04-06 RX ADMIN — LEVETIRACETAM 1000 MILLIGRAM(S): 250 TABLET, FILM COATED ORAL at 22:14

## 2019-04-06 RX ADMIN — Medication 1 DROP(S): at 05:30

## 2019-04-06 RX ADMIN — DIVALPROEX SODIUM 500 MILLIGRAM(S): 500 TABLET, DELAYED RELEASE ORAL at 13:47

## 2019-04-06 RX ADMIN — Medication 1 TABLET(S): at 05:30

## 2019-04-06 RX ADMIN — Medication 1 TABLET(S): at 17:01

## 2019-04-06 RX ADMIN — Medication 100 MILLIGRAM(S): at 13:47

## 2019-04-06 RX ADMIN — Medication 1 MILLIGRAM(S): at 13:44

## 2019-04-06 RX ADMIN — Medication 100 MILLIGRAM(S): at 22:14

## 2019-04-06 RX ADMIN — Medication 1 SPRAY(S): at 17:02

## 2019-04-06 RX ADMIN — DIVALPROEX SODIUM 500 MILLIGRAM(S): 500 TABLET, DELAYED RELEASE ORAL at 05:30

## 2019-04-06 RX ADMIN — LEVETIRACETAM 1000 MILLIGRAM(S): 250 TABLET, FILM COATED ORAL at 13:48

## 2019-04-06 RX ADMIN — ENOXAPARIN SODIUM 40 MILLIGRAM(S): 100 INJECTION SUBCUTANEOUS at 05:32

## 2019-04-06 RX ADMIN — Medication 1 MILLIGRAM(S): at 05:32

## 2019-04-06 RX ADMIN — OLANZAPINE 7.5 MILLIGRAM(S): 15 TABLET, FILM COATED ORAL at 22:14

## 2019-04-06 RX ADMIN — Medication 325 MILLIGRAM(S): at 13:47

## 2019-04-06 RX ADMIN — LEVETIRACETAM 1000 MILLIGRAM(S): 250 TABLET, FILM COATED ORAL at 05:30

## 2019-04-06 RX ADMIN — Medication 1 DROP(S): at 17:02

## 2019-04-06 NOTE — PROGRESS NOTE ADULT - ASSESSMENT
53 y.o. Female w/ hx  CP nonverbal at baseline, seizure disorder admitted for increased seizures, became hypothermic 3/26, treated for likely aspiration pneumonia now completed abx.  D/C to group home cancelled b/c recurrent hypothermia. So Far sepsis W/U has been negative. Suspect central hypothermia. Poss d/c to group home on 4/8 if temps stable off jose hugger

## 2019-04-06 NOTE — PROGRESS NOTE ADULT - SUBJECTIVE AND OBJECTIVE BOX
Patient is a 53y old  Female who presents with a chief complaint of seizures c/b asp pna and hypothermia      SUBJECTIVE / OVERNIGHT EVENTS: resting comfortably; jose hugger in place      MEDICATIONS  (STANDING):  amoxicillin  875 milliGRAM(s)/clavulanate 1 Tablet(s) Oral two times a day  artificial  tears Solution 1 Drop(s) Both EYES two times a day  aspirin  chewable 81 milliGRAM(s) Oral daily  BACItracin   Ointment 1 Application(s) Topical two times a day  buDESOnide   0.5 milliGRAM(s) Respule 0.5 milliGRAM(s) Inhalation daily  diVALproex  milliGRAM(s) Oral every 8 hours  docusate sodium 100 milliGRAM(s) Oral three times a day  enoxaparin Injectable 40 milliGRAM(s) SubCutaneous every 24 hours  ferrous    sulfate 325 milliGRAM(s) Oral daily  fluticasone propionate 50 MICROgram(s)/spray Nasal Spray 1 Spray(s) Both Nostrils two times a day  folic acid 1 milliGRAM(s) Oral daily  levETIRAcetam 1000 milliGRAM(s) Oral <User Schedule>  LORazepam     Tablet 1 milliGRAM(s) Oral every 8 hours  multivitamin 1 Tablet(s) Oral daily  OLANZapine 7.5 milliGRAM(s) Oral at bedtime  pantoprazole    Tablet 40 milliGRAM(s) Oral before breakfast  sodium chloride 0.45%. 1000 milliLiter(s) (50 mL/Hr) IV Continuous <Continuous>    MEDICATIONS  (PRN):  LORazepam   Injectable 1 milliGRAM(s) IntraMuscular every 6 hours PRN seizure      Vital Signs Last 24 Hrs  T(F): 97.1 (06 Apr 2019 05:33), Max: 98.1 (05 Apr 2019 19:33)  HR: 86 (06 Apr 2019 05:33) (76 - 91)  BP: 103/61 (06 Apr 2019 05:33) (103/61 - 135/95)  RR: 18 (06 Apr 2019 05:33) (18 - 18)  SpO2: 98% (06 Apr 2019 05:33) (97% - 100%)        PHYSICAL EXAM  GENERAL: NAD, well-developed  EYES: EOMI, PERRLA, conjunctiva and sclera clear  CHEST/LUNG: limited exam due to pt position grossly b/l AE  HEART: Regular rate and rhythm;   ABDOMEN: Soft, Nontender, Nondistended; Bowel sounds present  EXTREMITIES:  No clubbing, cyanosis, or edema    LABS:                        14.3   7.73  )-----------( 130      ( 05 Apr 2019 11:14 )             46.1     04-05    149<H>  |  105  |  18  ----------------------------<  122<H>  3.6   |  28  |  0.50    Ca    9.4      05 Apr 2019 11:14  Phos  3.4     04-05  Mg     1.8     04-05

## 2019-04-06 NOTE — PROGRESS NOTE ADULT - PROBLEM SELECTOR PLAN 1
previous infection w/u negative  s/p zosyn 7 day course for poss asp pna  restarted Zosyn on 4/2 now hypothermia improving.   Patient currently on augmentin because lost IV, IV in place but can cont augmentin  repeat Blood Cx so far negative  UA negative  CXR negative for PNA  repeat Blood Cx NTD  CT A/P negative; hypothermia most likely central  a.m. cortisol WNLs.  monitoring temperatures.

## 2019-04-07 LAB
ANION GAP SERPL CALC-SCNC: 16 MMO/L — HIGH (ref 7–14)
BACTERIA BLD CULT: SIGNIFICANT CHANGE UP
BUN SERPL-MCNC: 16 MG/DL — SIGNIFICANT CHANGE UP (ref 7–23)
CALCIUM SERPL-MCNC: 9.2 MG/DL — SIGNIFICANT CHANGE UP (ref 8.4–10.5)
CHLORIDE SERPL-SCNC: 105 MMOL/L — SIGNIFICANT CHANGE UP (ref 98–107)
CO2 SERPL-SCNC: 23 MMOL/L — SIGNIFICANT CHANGE UP (ref 22–31)
CREAT SERPL-MCNC: 0.54 MG/DL — SIGNIFICANT CHANGE UP (ref 0.5–1.3)
GLUCOSE SERPL-MCNC: 100 MG/DL — HIGH (ref 70–99)
MAGNESIUM SERPL-MCNC: 1.9 MG/DL — SIGNIFICANT CHANGE UP (ref 1.6–2.6)
PHOSPHATE SERPL-MCNC: 4.2 MG/DL — SIGNIFICANT CHANGE UP (ref 2.5–4.5)
POTASSIUM SERPL-MCNC: 4.9 MMOL/L — SIGNIFICANT CHANGE UP (ref 3.5–5.3)
POTASSIUM SERPL-SCNC: 4.9 MMOL/L — SIGNIFICANT CHANGE UP (ref 3.5–5.3)
SODIUM SERPL-SCNC: 144 MMOL/L — SIGNIFICANT CHANGE UP (ref 135–145)

## 2019-04-07 PROCEDURE — 99233 SBSQ HOSP IP/OBS HIGH 50: CPT

## 2019-04-07 RX ADMIN — PANTOPRAZOLE SODIUM 40 MILLIGRAM(S): 20 TABLET, DELAYED RELEASE ORAL at 05:54

## 2019-04-07 RX ADMIN — Medication 1 MILLIGRAM(S): at 21:32

## 2019-04-07 RX ADMIN — Medication 1 SPRAY(S): at 17:55

## 2019-04-07 RX ADMIN — Medication 1 MILLIGRAM(S): at 05:53

## 2019-04-07 RX ADMIN — LEVETIRACETAM 1000 MILLIGRAM(S): 250 TABLET, FILM COATED ORAL at 05:53

## 2019-04-07 RX ADMIN — Medication 100 MILLIGRAM(S): at 21:30

## 2019-04-07 RX ADMIN — LEVETIRACETAM 1000 MILLIGRAM(S): 250 TABLET, FILM COATED ORAL at 21:30

## 2019-04-07 RX ADMIN — DIVALPROEX SODIUM 500 MILLIGRAM(S): 500 TABLET, DELAYED RELEASE ORAL at 21:30

## 2019-04-07 RX ADMIN — Medication 81 MILLIGRAM(S): at 14:23

## 2019-04-07 RX ADMIN — Medication 100 MILLIGRAM(S): at 05:53

## 2019-04-07 RX ADMIN — Medication 1 APPLICATION(S): at 05:53

## 2019-04-07 RX ADMIN — DIVALPROEX SODIUM 500 MILLIGRAM(S): 500 TABLET, DELAYED RELEASE ORAL at 05:53

## 2019-04-07 RX ADMIN — Medication 1 TABLET(S): at 14:24

## 2019-04-07 RX ADMIN — Medication 1 TABLET(S): at 17:55

## 2019-04-07 RX ADMIN — OLANZAPINE 7.5 MILLIGRAM(S): 15 TABLET, FILM COATED ORAL at 21:30

## 2019-04-07 RX ADMIN — Medication 1 DROP(S): at 17:54

## 2019-04-07 RX ADMIN — Medication 100 MILLIGRAM(S): at 14:23

## 2019-04-07 RX ADMIN — Medication 1 MILLIGRAM(S): at 14:23

## 2019-04-07 RX ADMIN — Medication 1 DROP(S): at 05:53

## 2019-04-07 RX ADMIN — ENOXAPARIN SODIUM 40 MILLIGRAM(S): 100 INJECTION SUBCUTANEOUS at 05:54

## 2019-04-07 RX ADMIN — LEVETIRACETAM 1000 MILLIGRAM(S): 250 TABLET, FILM COATED ORAL at 14:23

## 2019-04-07 RX ADMIN — Medication 1 APPLICATION(S): at 17:55

## 2019-04-07 RX ADMIN — Medication 325 MILLIGRAM(S): at 14:23

## 2019-04-07 RX ADMIN — DIVALPROEX SODIUM 500 MILLIGRAM(S): 500 TABLET, DELAYED RELEASE ORAL at 14:23

## 2019-04-07 RX ADMIN — Medication 1 TABLET(S): at 05:54

## 2019-04-07 RX ADMIN — Medication 1 SPRAY(S): at 05:53

## 2019-04-07 NOTE — PROGRESS NOTE ADULT - PROBLEM SELECTOR PLAN 1
previous infection w/u negative  s/p zosyn 7 day course for poss asp pna  restarted Zosyn on 4/2 now hypothermia improving. would cont until 4/8 (7 days total)  poss central hypothermia vs indolent infection  Patient currently on augmentin because lost IV, IV in place but can cont augmentin  repeat Blood Cx so far negative  UA negative  CXR negative for PNA  repeat Blood Cx NTD  CT A/P negative; hypothermia most likely central  a.m. cortisol WNLs.  monitoring temperatures.

## 2019-04-07 NOTE — PROGRESS NOTE ADULT - SUBJECTIVE AND OBJECTIVE BOX
Patient is a 53y old  Female who presents with a chief complaint of seizures         SUBJECTIVE / OVERNIGHT EVENTS: off jose cristiane, temps 97, stable; alert this AM; low gluc noted from yesterday AM; encourage PO      MEDICATIONS  (STANDING):  amoxicillin  875 milliGRAM(s)/clavulanate 1 Tablet(s) Oral two times a day  artificial  tears Solution 1 Drop(s) Both EYES two times a day  aspirin  chewable 81 milliGRAM(s) Oral daily  BACItracin   Ointment 1 Application(s) Topical two times a day  buDESOnide   0.5 milliGRAM(s) Respule 0.5 milliGRAM(s) Inhalation daily  diVALproex  milliGRAM(s) Oral every 8 hours  docusate sodium 100 milliGRAM(s) Oral three times a day  enoxaparin Injectable 40 milliGRAM(s) SubCutaneous every 24 hours  ferrous    sulfate 325 milliGRAM(s) Oral daily  fluticasone propionate 50 MICROgram(s)/spray Nasal Spray 1 Spray(s) Both Nostrils two times a day  folic acid 1 milliGRAM(s) Oral daily  levETIRAcetam 1000 milliGRAM(s) Oral <User Schedule>  LORazepam     Tablet 1 milliGRAM(s) Oral every 8 hours  multivitamin 1 Tablet(s) Oral daily  OLANZapine 7.5 milliGRAM(s) Oral at bedtime  pantoprazole    Tablet 40 milliGRAM(s) Oral before breakfast  sodium chloride 0.45%. 1000 milliLiter(s) (50 mL/Hr) IV Continuous <Continuous>    MEDICATIONS  (PRN):  LORazepam   Injectable 1 milliGRAM(s) IntraMuscular every 6 hours PRN seizure      Vital Signs Last 24 Hrs  T(F): 97.6 (07 Apr 2019 05:55), Max: 98.7 (06 Apr 2019 13:43)  HR: 76 (07 Apr 2019 05:55) (76 - 87)  BP: 119/59 (07 Apr 2019 05:55) (110/60 - 122/68)  RR: 18 (07 Apr 2019 05:55) (18 - 18)  SpO2: 99% (07 Apr 2019 05:55) (99% - 100%)          PHYSICAL EXAM  GENERAL: NAD, well-developed  EYES: EOMI, PERRLA, conjunctiva and sclera clear  CHEST/LUNG: grossly clear ant  HEART: Regular rate and rhythm;  ABDOMEN: Soft, Nontender, Nondistended; Bowel sounds present  EXTREMITIES:   No clubbing, cyanosis, or edema  PSYCH: non verbal      LABS:    04-07    144  |  105  |  16  ----------------------------<  100<H>  4.9   |  23  |  0.54    Ca    9.2      07 Apr 2019 08:45  Phos  4.2     04-07  Mg     1.9     04-07

## 2019-04-08 PROCEDURE — 99233 SBSQ HOSP IP/OBS HIGH 50: CPT

## 2019-04-08 RX ORDER — BACITRACIN ZINC 500 UNIT/G
1 OINTMENT IN PACKET (EA) TOPICAL
Qty: 0 | Refills: 0 | DISCHARGE
Start: 2019-04-08

## 2019-04-08 RX ADMIN — Medication 100 MILLIGRAM(S): at 21:59

## 2019-04-08 RX ADMIN — Medication 1 MILLIGRAM(S): at 13:59

## 2019-04-08 RX ADMIN — PANTOPRAZOLE SODIUM 40 MILLIGRAM(S): 20 TABLET, DELAYED RELEASE ORAL at 05:22

## 2019-04-08 RX ADMIN — Medication 1 MILLIGRAM(S): at 22:02

## 2019-04-08 RX ADMIN — Medication 1 TABLET(S): at 13:59

## 2019-04-08 RX ADMIN — Medication 1 TABLET(S): at 05:24

## 2019-04-08 RX ADMIN — LEVETIRACETAM 1000 MILLIGRAM(S): 250 TABLET, FILM COATED ORAL at 13:56

## 2019-04-08 RX ADMIN — Medication 325 MILLIGRAM(S): at 13:56

## 2019-04-08 RX ADMIN — Medication 1 SPRAY(S): at 05:23

## 2019-04-08 RX ADMIN — Medication 1 SPRAY(S): at 17:23

## 2019-04-08 RX ADMIN — ENOXAPARIN SODIUM 40 MILLIGRAM(S): 100 INJECTION SUBCUTANEOUS at 05:23

## 2019-04-08 RX ADMIN — DIVALPROEX SODIUM 500 MILLIGRAM(S): 500 TABLET, DELAYED RELEASE ORAL at 21:59

## 2019-04-08 RX ADMIN — DIVALPROEX SODIUM 500 MILLIGRAM(S): 500 TABLET, DELAYED RELEASE ORAL at 05:21

## 2019-04-08 RX ADMIN — LEVETIRACETAM 1000 MILLIGRAM(S): 250 TABLET, FILM COATED ORAL at 05:22

## 2019-04-08 RX ADMIN — Medication 1 APPLICATION(S): at 17:23

## 2019-04-08 RX ADMIN — Medication 1 DROP(S): at 05:22

## 2019-04-08 RX ADMIN — Medication 1 MILLIGRAM(S): at 05:27

## 2019-04-08 RX ADMIN — Medication 1 TABLET(S): at 17:22

## 2019-04-08 RX ADMIN — Medication 1 APPLICATION(S): at 05:22

## 2019-04-08 RX ADMIN — OLANZAPINE 7.5 MILLIGRAM(S): 15 TABLET, FILM COATED ORAL at 21:59

## 2019-04-08 RX ADMIN — LEVETIRACETAM 1000 MILLIGRAM(S): 250 TABLET, FILM COATED ORAL at 22:00

## 2019-04-08 RX ADMIN — DIVALPROEX SODIUM 500 MILLIGRAM(S): 500 TABLET, DELAYED RELEASE ORAL at 13:56

## 2019-04-08 RX ADMIN — Medication 1 DROP(S): at 17:22

## 2019-04-08 RX ADMIN — Medication 81 MILLIGRAM(S): at 13:55

## 2019-04-08 RX ADMIN — Medication 100 MILLIGRAM(S): at 05:22

## 2019-04-08 NOTE — PROGRESS NOTE ADULT - ASSESSMENT
53 y.o. Female w/ hx  CP nonverbal at baseline, seizure disorder admitted for increased seizures, became hypothermic 3/26, treated for likely aspiration pneumonia now completed abx.  D/C to group home cancelled b/c recurrent hypothermia. So Far sepsis W/U has been negative. Suspect central hypothermia. d/c to group home on 4/9

## 2019-04-08 NOTE — PROGRESS NOTE ADULT - SUBJECTIVE AND OBJECTIVE BOX
Patient is a 53y old  Female who presents with a chief complaint of seizures (07 Apr 2019 11:27)      SUBJECTIVE / OVERNIGHT EVENTS:  Nonverbal. No active issues overnight.     MEDICATIONS  (STANDING):  amoxicillin  875 milliGRAM(s)/clavulanate 1 Tablet(s) Oral two times a day  artificial  tears Solution 1 Drop(s) Both EYES two times a day  aspirin  chewable 81 milliGRAM(s) Oral daily  BACItracin   Ointment 1 Application(s) Topical two times a day  buDESOnide   0.5 milliGRAM(s) Respule 0.5 milliGRAM(s) Inhalation daily  diVALproex  milliGRAM(s) Oral every 8 hours  docusate sodium 100 milliGRAM(s) Oral three times a day  enoxaparin Injectable 40 milliGRAM(s) SubCutaneous every 24 hours  ferrous    sulfate 325 milliGRAM(s) Oral daily  fluticasone propionate 50 MICROgram(s)/spray Nasal Spray 1 Spray(s) Both Nostrils two times a day  folic acid 1 milliGRAM(s) Oral daily  levETIRAcetam 1000 milliGRAM(s) Oral <User Schedule>  LORazepam     Tablet 1 milliGRAM(s) Oral every 8 hours  multivitamin 1 Tablet(s) Oral daily  OLANZapine 7.5 milliGRAM(s) Oral at bedtime  pantoprazole    Tablet 40 milliGRAM(s) Oral before breakfast    MEDICATIONS  (PRN):  LORazepam   Injectable 1 milliGRAM(s) IntraMuscular every 6 hours PRN seizure      Vital Signs Last 24 Hrs  T(C): 36.3 (08 Apr 2019 13:50), Max: 36.5 (08 Apr 2019 05:14)  T(F): 97.4 (08 Apr 2019 13:50), Max: 97.7 (08 Apr 2019 05:14)  HR: 69 (08 Apr 2019 13:50) (69 - 90)  BP: 106/85 (08 Apr 2019 13:50) (106/85 - 124/76)  BP(mean): --  RR: 18 (08 Apr 2019 13:50) (18 - 18)  SpO2: 98% (08 Apr 2019 13:50) (98% - 99%)  CAPILLARY BLOOD GLUCOSE        I&O's Summary      PHYSICAL EXAM:  GENERAL: NAD, well-developed  HEAD:  Atraumatic, Normocephalic  EYES: EOMI, PERRLA, conjunctiva and sclera clear  NECK: Supple, No JVD  CHEST/LUNG: Clear to auscultation bilaterally; No wheeze  HEART: Regular rate and rhythm; No murmurs, rubs, or gallops  ABDOMEN: Soft, Nontender, Nondistended; Bowel sounds present  EXTREMITIES:  2+ Peripheral Pulses, No clubbing, cyanosis, or edema  PSYCH: AAOx1; nonverbal  NEUROLOGY: non-focal  SKIN: No rashes or lesions    LABS:    04-07    144  |  105  |  16  ----------------------------<  100<H>  4.9   |  23  |  0.54    Ca    9.2      07 Apr 2019 08:45  Phos  4.2     04-07  Mg     1.9     04-07                RADIOLOGY & ADDITIONAL TESTS:    Imaging Personally Reviewed:    Consultant(s) Notes Reviewed:      Care Discussed with Consultants/Other Providers:

## 2019-04-08 NOTE — PROGRESS NOTE ADULT - PROBLEM SELECTOR PLAN 1
resolved; off bearhugger.   previous infection w/u negative  s/p zosyn 7 day course for poss asp pna  restarted Zosyn on 4/2 now hypothermia improving. would cont until 4/9 (7 days total)  poss central hypothermia vs indolent infection  Patient currently on augmentin because lost IV, IV in place but can cont augmentin  repeat Blood Cx so far negative  UA negative  CXR negative for PNA  repeat Blood Cx NTD  CT A/P negative; hypothermia most likely central  a.m. cortisol WNLs.  monitoring temperatures.

## 2019-04-09 VITALS
TEMPERATURE: 95 F | RESPIRATION RATE: 18 BRPM | DIASTOLIC BLOOD PRESSURE: 81 MMHG | OXYGEN SATURATION: 95 % | SYSTOLIC BLOOD PRESSURE: 120 MMHG | HEART RATE: 79 BPM

## 2019-04-09 PROCEDURE — 99239 HOSP IP/OBS DSCHRG MGMT >30: CPT

## 2019-04-09 PROCEDURE — 71045 X-RAY EXAM CHEST 1 VIEW: CPT | Mod: 26

## 2019-04-09 RX ADMIN — LEVETIRACETAM 1000 MILLIGRAM(S): 250 TABLET, FILM COATED ORAL at 05:55

## 2019-04-09 RX ADMIN — Medication 1 TABLET(S): at 05:54

## 2019-04-09 RX ADMIN — Medication 1 MILLIGRAM(S): at 05:57

## 2019-04-09 RX ADMIN — Medication 1 SPRAY(S): at 05:55

## 2019-04-09 RX ADMIN — PANTOPRAZOLE SODIUM 40 MILLIGRAM(S): 20 TABLET, DELAYED RELEASE ORAL at 05:55

## 2019-04-09 RX ADMIN — Medication 1 DROP(S): at 05:54

## 2019-04-09 RX ADMIN — ENOXAPARIN SODIUM 40 MILLIGRAM(S): 100 INJECTION SUBCUTANEOUS at 05:54

## 2019-04-09 RX ADMIN — DIVALPROEX SODIUM 500 MILLIGRAM(S): 500 TABLET, DELAYED RELEASE ORAL at 05:55

## 2019-04-09 RX ADMIN — Medication 100 MILLIGRAM(S): at 05:54

## 2019-04-09 RX ADMIN — Medication 1 APPLICATION(S): at 05:54

## 2019-04-09 NOTE — PROGRESS NOTE ADULT - ASSESSMENT
53 y.o. Female w/ hx  CP nonverbal at baseline, seizure disorder admitted for increased seizures, became hypothermic 3/26, treated for likely aspiration pneumonia now completed abx.  D/C to group home cancelled b/c recurrent hypothermia. So Far sepsis W/U has been negative. Suspect central hypothermia. Spoke to  who reviewed her records at group home and her temps average from 95- 96 chronically. d/c to group home on 4/9 . D/C 40 minutes.

## 2019-04-09 NOTE — PROGRESS NOTE ADULT - PROBLEM SELECTOR PROBLEM 6
Severe protein-calorie malnutrition

## 2019-04-09 NOTE — PROGRESS NOTE ADULT - PROBLEM SELECTOR PROBLEM 1
Hypothermia, initial encounter
Seizures
Hypothermia, initial encounter
Seizures
Hypothermia, initial encounter
Seizures
Hypothermia, initial encounter

## 2019-04-09 NOTE — PROGRESS NOTE ADULT - PROBLEM SELECTOR PLAN 6
PO intake as tolerated  no PEG per mother

## 2019-04-09 NOTE — PROGRESS NOTE ADULT - REASON FOR ADMISSION
seizures

## 2019-04-09 NOTE — PROGRESS NOTE ADULT - PROBLEM SELECTOR PLAN 1
recurrent but likely chronic.   previous infection w/u negative  s/p zosyn 7 day course for poss asp pna  restarted Zosyn on 4/2 now hypothermia still present with negative w/u; to complete abx until today 4/9 (7 days total)  most likely with central hypothermia   repeat Blood Cx so far negative  UA negative  CXR negative for PNA  repeat Blood Cx NTD  CT A/P negative; hypothermia most likely central  a.m. cortisol WNLs.  monitoring temperatures. recurrent but likely chronic.   previous infection w/u negative  Patient is s/p 14 days of IV abx to complete today 4/9  most likely with central hypothermia   repeat Blood Cx so far negative  UA negative  CXR negative for PNA; repeat CXR on 4/9 is rotated but no obvious infiltrate.   repeat Blood Cx NTD  CT A/P negative; hypothermia most likely central  a.m. cortisol WNLs.  monitoring temperatures.

## 2019-04-09 NOTE — PROGRESS NOTE ADULT - SUBJECTIVE AND OBJECTIVE BOX
Patient is a 53y old  Female who presents with a chief complaint of seizures (08 Apr 2019 13:52)      SUBJECTIVE / OVERNIGHT EVENTS:  Patient nonverbal. no active issues overnight.     MEDICATIONS  (STANDING):  amoxicillin  875 milliGRAM(s)/clavulanate 1 Tablet(s) Oral two times a day  artificial  tears Solution 1 Drop(s) Both EYES two times a day  aspirin  chewable 81 milliGRAM(s) Oral daily  BACItracin   Ointment 1 Application(s) Topical two times a day  buDESOnide   0.5 milliGRAM(s) Respule 0.5 milliGRAM(s) Inhalation daily  diVALproex  milliGRAM(s) Oral every 8 hours  docusate sodium 100 milliGRAM(s) Oral three times a day  enoxaparin Injectable 40 milliGRAM(s) SubCutaneous every 24 hours  ferrous    sulfate 325 milliGRAM(s) Oral daily  fluticasone propionate 50 MICROgram(s)/spray Nasal Spray 1 Spray(s) Both Nostrils two times a day  folic acid 1 milliGRAM(s) Oral daily  levETIRAcetam 1000 milliGRAM(s) Oral <User Schedule>  LORazepam     Tablet 1 milliGRAM(s) Oral every 8 hours  multivitamin 1 Tablet(s) Oral daily  OLANZapine 7.5 milliGRAM(s) Oral at bedtime  pantoprazole    Tablet 40 milliGRAM(s) Oral before breakfast    MEDICATIONS  (PRN):  LORazepam   Injectable 1 milliGRAM(s) IntraMuscular every 6 hours PRN seizure      Vital Signs Last 24 Hrs  T(C): 35.2 (09 Apr 2019 05:13), Max: 36.3 (08 Apr 2019 13:50)  T(F): 95.3 (09 Apr 2019 05:13), Max: 97.4 (08 Apr 2019 13:50)  HR: 79 (09 Apr 2019 05:13) (64 - 79)  BP: 120/81 (09 Apr 2019 05:13) (106/85 - 120/81)  BP(mean): --  RR: 18 (09 Apr 2019 05:13) (18 - 18)  SpO2: 95% (09 Apr 2019 05:13) (95% - 98%)  CAPILLARY BLOOD GLUCOSE        I&O's Summary      PHYSICAL EXAM:  GENERAL: NAD, well-developed  HEAD:  Atraumatic, Normocephalic  EYES: EOMI, PERRLA, conjunctiva and sclera clear  NECK: Supple, No JVD  CHEST/LUNG: Clear to auscultation bilaterally; No wheeze  HEART: Regular rate and rhythm; No murmurs, rubs, or gallops  ABDOMEN: Soft, Nontender, Nondistended; Bowel sounds present  EXTREMITIES:  2+ Peripheral Pulses, No clubbing, cyanosis, or edema  PSYCH: AAOx1  NEUROLOGY: non-focal  SKIN: No rashes or lesions    LABS:                    RADIOLOGY & ADDITIONAL TESTS:    Imaging Personally Reviewed:    Consultant(s) Notes Reviewed:      Care Discussed with Consultants/Other Providers:

## 2019-04-09 NOTE — PROGRESS NOTE ADULT - PROVIDER SPECIALTY LIST ADULT
Hospitalist
Infectious Disease
Hospitalist
Infectious Disease
Neurology
Hospitalist

## 2019-04-10 LAB
SPECIMEN SOURCE: SIGNIFICANT CHANGE UP
SPECIMEN SOURCE: SIGNIFICANT CHANGE UP

## 2019-04-14 LAB
BACTERIA BLD CULT: SIGNIFICANT CHANGE UP
BACTERIA BLD CULT: SIGNIFICANT CHANGE UP

## 2019-04-22 ENCOUNTER — INPATIENT (INPATIENT)
Facility: HOSPITAL | Age: 54
LOS: 7 days | Discharge: NURSING, REHAB OR LONG-TERM CARE | End: 2019-04-30
Attending: HOSPITALIST | Admitting: HOSPITALIST
Payer: MEDICARE

## 2019-04-22 VITALS
SYSTOLIC BLOOD PRESSURE: 108 MMHG | WEIGHT: 158.73 LBS | TEMPERATURE: 99 F | DIASTOLIC BLOOD PRESSURE: 85 MMHG | HEART RATE: 100 BPM | RESPIRATION RATE: 20 BRPM | OXYGEN SATURATION: 97 %

## 2019-04-22 LAB
ALBUMIN SERPL ELPH-MCNC: 3.2 G/DL — LOW (ref 3.3–5)
ALP SERPL-CCNC: 63 U/L — SIGNIFICANT CHANGE UP (ref 40–120)
ALT FLD-CCNC: 28 U/L — SIGNIFICANT CHANGE UP (ref 4–33)
AMMONIA BLD-MCNC: 55 UMOL/L — SIGNIFICANT CHANGE UP (ref 11–55)
ANION GAP SERPL CALC-SCNC: 14 MMO/L — SIGNIFICANT CHANGE UP (ref 7–14)
APPEARANCE UR: CLEAR — SIGNIFICANT CHANGE UP
AST SERPL-CCNC: 36 U/L — HIGH (ref 4–32)
BACTERIA # UR AUTO: SIGNIFICANT CHANGE UP
BASE EXCESS BLDV CALC-SCNC: 7.8 MMOL/L — SIGNIFICANT CHANGE UP
BASOPHILS # BLD AUTO: 0.02 K/UL — SIGNIFICANT CHANGE UP (ref 0–0.2)
BASOPHILS NFR BLD AUTO: 0.4 % — SIGNIFICANT CHANGE UP (ref 0–2)
BASOPHILS NFR SPEC: 0 % — SIGNIFICANT CHANGE UP (ref 0–2)
BILIRUB SERPL-MCNC: 0.3 MG/DL — SIGNIFICANT CHANGE UP (ref 0.2–1.2)
BILIRUB UR-MCNC: NEGATIVE — SIGNIFICANT CHANGE UP
BLASTS # FLD: 0 % — SIGNIFICANT CHANGE UP (ref 0–0)
BLOOD GAS VENOUS - CREATININE: 0.55 MG/DL — SIGNIFICANT CHANGE UP (ref 0.5–1.3)
BLOOD UR QL VISUAL: NEGATIVE — SIGNIFICANT CHANGE UP
BUN SERPL-MCNC: 19 MG/DL — SIGNIFICANT CHANGE UP (ref 7–23)
CALCIUM SERPL-MCNC: 9.2 MG/DL — SIGNIFICANT CHANGE UP (ref 8.4–10.5)
CHLORIDE BLDV-SCNC: 104 MMOL/L — SIGNIFICANT CHANGE UP (ref 96–108)
CHLORIDE SERPL-SCNC: 101 MMOL/L — SIGNIFICANT CHANGE UP (ref 98–107)
CO2 SERPL-SCNC: 26 MMOL/L — SIGNIFICANT CHANGE UP (ref 22–31)
COLOR SPEC: YELLOW — SIGNIFICANT CHANGE UP
CREAT SERPL-MCNC: 0.57 MG/DL — SIGNIFICANT CHANGE UP (ref 0.5–1.3)
EOSINOPHIL # BLD AUTO: 0.04 K/UL — SIGNIFICANT CHANGE UP (ref 0–0.5)
EOSINOPHIL NFR BLD AUTO: 0.8 % — SIGNIFICANT CHANGE UP (ref 0–6)
EOSINOPHIL NFR FLD: 0 % — SIGNIFICANT CHANGE UP (ref 0–6)
EPI CELLS # UR: SIGNIFICANT CHANGE UP
GAS PNL BLDV: 140 MMOL/L — SIGNIFICANT CHANGE UP (ref 136–146)
GIANT PLATELETS BLD QL SMEAR: PRESENT — SIGNIFICANT CHANGE UP
GLUCOSE BLDV-MCNC: 103 — HIGH (ref 70–99)
GLUCOSE SERPL-MCNC: 100 MG/DL — HIGH (ref 70–99)
GLUCOSE UR-MCNC: NEGATIVE — SIGNIFICANT CHANGE UP
HCG SERPL-ACNC: < 5 MIU/ML — SIGNIFICANT CHANGE UP
HCO3 BLDV-SCNC: 31 MMOL/L — HIGH (ref 20–27)
HCT VFR BLD CALC: 42.3 % — SIGNIFICANT CHANGE UP (ref 34.5–45)
HCT VFR BLDV CALC: 41.4 % — SIGNIFICANT CHANGE UP (ref 34.5–45)
HGB BLD-MCNC: 13.1 G/DL — SIGNIFICANT CHANGE UP (ref 11.5–15.5)
HGB BLDV-MCNC: 13.5 G/DL — SIGNIFICANT CHANGE UP (ref 11.5–15.5)
IMM GRANULOCYTES NFR BLD AUTO: 0.4 % — SIGNIFICANT CHANGE UP (ref 0–1.5)
KETONES UR-MCNC: 20 — SIGNIFICANT CHANGE UP
LACTATE BLDV-MCNC: 0.9 MMOL/L — SIGNIFICANT CHANGE UP (ref 0.5–2)
LEUKOCYTE ESTERASE UR-ACNC: NEGATIVE — SIGNIFICANT CHANGE UP
LIDOCAIN IGE QN: 10.1 U/L — SIGNIFICANT CHANGE UP (ref 7–60)
LYMPHOCYTES # BLD AUTO: 0.97 K/UL — LOW (ref 1–3.3)
LYMPHOCYTES # BLD AUTO: 19.1 % — SIGNIFICANT CHANGE UP (ref 13–44)
LYMPHOCYTES NFR SPEC AUTO: 13 % — SIGNIFICANT CHANGE UP (ref 13–44)
MACROCYTES BLD QL: SLIGHT — SIGNIFICANT CHANGE UP
MCHC RBC-ENTMCNC: 30.8 PG — SIGNIFICANT CHANGE UP (ref 27–34)
MCHC RBC-ENTMCNC: 31 % — LOW (ref 32–36)
MCV RBC AUTO: 99.3 FL — SIGNIFICANT CHANGE UP (ref 80–100)
METAMYELOCYTES # FLD: 0 % — SIGNIFICANT CHANGE UP (ref 0–1)
MONOCYTES # BLD AUTO: 0.97 K/UL — HIGH (ref 0–0.9)
MONOCYTES NFR BLD AUTO: 19.1 % — HIGH (ref 2–14)
MONOCYTES NFR BLD: 16.5 % — HIGH (ref 2–9)
MUCOUS THREADS # UR AUTO: SIGNIFICANT CHANGE UP
MYELOCYTES NFR BLD: 0 % — SIGNIFICANT CHANGE UP (ref 0–0)
NEUTROPHIL AB SER-ACNC: 66.1 % — SIGNIFICANT CHANGE UP (ref 43–77)
NEUTROPHILS # BLD AUTO: 3.07 K/UL — SIGNIFICANT CHANGE UP (ref 1.8–7.4)
NEUTROPHILS NFR BLD AUTO: 60.2 % — SIGNIFICANT CHANGE UP (ref 43–77)
NEUTS BAND # BLD: 0 % — SIGNIFICANT CHANGE UP (ref 0–6)
NITRITE UR-MCNC: NEGATIVE — SIGNIFICANT CHANGE UP
NRBC # FLD: 0 K/UL — SIGNIFICANT CHANGE UP (ref 0–0)
OTHER - HEMATOLOGY %: 0 — SIGNIFICANT CHANGE UP
PCO2 BLDV: 51 MMHG — SIGNIFICANT CHANGE UP (ref 41–51)
PH BLDV: 7.42 PH — SIGNIFICANT CHANGE UP (ref 7.32–7.43)
PH UR: 6 — SIGNIFICANT CHANGE UP (ref 5–8)
PLATELET # BLD AUTO: 220 K/UL — SIGNIFICANT CHANGE UP (ref 150–400)
PLATELET COUNT - ESTIMATE: NORMAL — SIGNIFICANT CHANGE UP
PMV BLD: 10.9 FL — SIGNIFICANT CHANGE UP (ref 7–13)
PO2 BLDV: 82 MMHG — HIGH (ref 35–40)
POTASSIUM BLDV-SCNC: 3.9 MMOL/L — SIGNIFICANT CHANGE UP (ref 3.4–4.5)
POTASSIUM SERPL-MCNC: 4 MMOL/L — SIGNIFICANT CHANGE UP (ref 3.5–5.3)
POTASSIUM SERPL-SCNC: 4 MMOL/L — SIGNIFICANT CHANGE UP (ref 3.5–5.3)
PROMYELOCYTES # FLD: 0 % — SIGNIFICANT CHANGE UP (ref 0–0)
PROT SERPL-MCNC: 6.9 G/DL — SIGNIFICANT CHANGE UP (ref 6–8.3)
PROT UR-MCNC: 30 — SIGNIFICANT CHANGE UP
RBC # BLD: 4.26 M/UL — SIGNIFICANT CHANGE UP (ref 3.8–5.2)
RBC # FLD: 15.7 % — HIGH (ref 10.3–14.5)
RBC CASTS # UR COMP ASSIST: SIGNIFICANT CHANGE UP (ref 0–?)
SAO2 % BLDV: 96.3 % — HIGH (ref 60–85)
SODIUM SERPL-SCNC: 141 MMOL/L — SIGNIFICANT CHANGE UP (ref 135–145)
SP GR SPEC: 1.04 — SIGNIFICANT CHANGE UP (ref 1–1.04)
UROBILINOGEN FLD QL: 2 — SIGNIFICANT CHANGE UP
VALPROATE SERPL-MCNC: 63.6 UG/ML — SIGNIFICANT CHANGE UP (ref 50–100)
VARIANT LYMPHS # BLD: 4.4 % — SIGNIFICANT CHANGE UP
WBC # BLD: 5.09 K/UL — SIGNIFICANT CHANGE UP (ref 3.8–10.5)
WBC # FLD AUTO: 5.09 K/UL — SIGNIFICANT CHANGE UP (ref 3.8–10.5)
WBC UR QL: SIGNIFICANT CHANGE UP (ref 0–?)

## 2019-04-22 PROCEDURE — 70450 CT HEAD/BRAIN W/O DYE: CPT | Mod: 26

## 2019-04-22 PROCEDURE — 71045 X-RAY EXAM CHEST 1 VIEW: CPT | Mod: 26

## 2019-04-22 RX ORDER — MIDAZOLAM HYDROCHLORIDE 1 MG/ML
5 INJECTION, SOLUTION INTRAMUSCULAR; INTRAVENOUS ONCE
Refills: 0 | Status: DISCONTINUED | OUTPATIENT
Start: 2019-04-22 | End: 2019-04-22

## 2019-04-22 RX ORDER — VALPROIC ACID (AS SODIUM SALT) 250 MG/5ML
1440 SOLUTION, ORAL ORAL ONCE
Refills: 0 | Status: COMPLETED | OUTPATIENT
Start: 2019-04-22 | End: 2019-04-22

## 2019-04-22 RX ORDER — SODIUM CHLORIDE 9 MG/ML
1000 INJECTION INTRAMUSCULAR; INTRAVENOUS; SUBCUTANEOUS ONCE
Refills: 0 | Status: COMPLETED | OUTPATIENT
Start: 2019-04-22 | End: 2019-04-22

## 2019-04-22 RX ORDER — MIDAZOLAM HYDROCHLORIDE 1 MG/ML
2 INJECTION, SOLUTION INTRAMUSCULAR; INTRAVENOUS ONCE
Refills: 0 | Status: DISCONTINUED | OUTPATIENT
Start: 2019-04-22 | End: 2019-04-22

## 2019-04-22 RX ORDER — LEVETIRACETAM 250 MG/1
1000 TABLET, FILM COATED ORAL ONCE
Refills: 0 | Status: COMPLETED | OUTPATIENT
Start: 2019-04-22 | End: 2019-04-22

## 2019-04-22 RX ADMIN — MIDAZOLAM HYDROCHLORIDE 5 MILLIGRAM(S): 1 INJECTION, SOLUTION INTRAMUSCULAR; INTRAVENOUS at 18:38

## 2019-04-22 RX ADMIN — SODIUM CHLORIDE 1000 MILLILITER(S): 9 INJECTION INTRAMUSCULAR; INTRAVENOUS; SUBCUTANEOUS at 22:22

## 2019-04-22 RX ADMIN — LEVETIRACETAM 1000 MILLIGRAM(S): 250 TABLET, FILM COATED ORAL at 19:45

## 2019-04-22 RX ADMIN — SODIUM CHLORIDE 1000 MILLILITER(S): 9 INJECTION INTRAMUSCULAR; INTRAVENOUS; SUBCUTANEOUS at 19:29

## 2019-04-22 RX ADMIN — MIDAZOLAM HYDROCHLORIDE 2 MILLIGRAM(S): 1 INJECTION, SOLUTION INTRAMUSCULAR; INTRAVENOUS at 22:45

## 2019-04-22 RX ADMIN — LEVETIRACETAM 400 MILLIGRAM(S): 250 TABLET, FILM COATED ORAL at 19:29

## 2019-04-22 RX ADMIN — Medication 1440 MILLIGRAM(S): at 22:22

## 2019-04-22 RX ADMIN — Medication 32.2 MILLIGRAM(S): at 20:22

## 2019-04-22 NOTE — ED PROVIDER NOTE - PHYSICAL EXAMINATION
Pt not cooperative with neuro exam, however, able to track with eyes and able to make gross movements with upper extremities.

## 2019-04-22 NOTE — ED ADULT NURSE REASSESSMENT NOTE - NS ED NURSE REASSESS COMMENT FT1
Pt. ripped out IV and continues to rip off cardiac monitor leads. Pt. not redirectable at this time. MD Gagnon made aware. IV access attempted by author of this note and unable to obtain. MD aware, awaiting ultrasound IV and CT. Pt. medicated as per order. Aide at bedside. Will continue to monitor.

## 2019-04-22 NOTE — ED PROVIDER NOTE - CLINICAL SUMMARY MEDICAL DECISION MAKING FREE TEXT BOX
Pt is a 54 y/o F PMHx CP (nonverbal, nonambualtory, wheelchair bound at baseline) p/w seizures today. -- concerning for seizures despite taking medications -- labs, urine, ct head, iv keppra

## 2019-04-22 NOTE — ED PROVIDER NOTE - ATTENDING CONTRIBUTION TO CARE
54 yo female with PMH CP and seizures brought to ED for evaluation of nausea and vomiting with 5 episodes of seizure today. Patient has been compliant with seizure meds. Denies fevers or chills. Patient nonverbal. Patient becomes agitated when she is approached or touched.    Gen: well appearing, awake  Cardiac: regular rate and rhythm  Pulm: Clear to auscultation bilaterally  Abd: soft, nondistended, voluntary guarding (unclear if 2/2 pain or agitation)      MDM  Nonverbal female with nausea, vomiting, breakthrough seizures today. Will check labs, ekg, imaging, medicate, adm

## 2019-04-22 NOTE — ED PROVIDER NOTE - NONTENDER LOCATION
left upper quadrant/right upper quadrant/left lower quadrant/right lower quadrant/periumbilical/umbilical/suprapubic/left costovertebral angle/right costovertebral angle

## 2019-04-22 NOTE — CONSULT NOTE ADULT - ATTENDING COMMENTS
Patient with CP and seizure disorder who is nonverbal at baseline requiring 1:1. She reportedly gets 1 seizure per month and has been noncompliant with AEDs in the past. She presented with several GTC at her group home yesterday and found to have enteritis with hypotension. She was loaded with keppra and VPA which she is on at home. On exam, she does not follow commands or communicate. Dysconjugate gaze. Agitated when touching her face or arms with spontaneous WD. LE movements limited and able to withdraw using her hip flexors.     Breakthrough seizures likely status epilepticus in the setting of infection +/- non-adherence    Plan  Cont keppra and depakote as outlined in resident's note  cEEG to r/o subclinical status

## 2019-04-22 NOTE — ED PROVIDER NOTE - OBJECTIVE STATEMENT
Pt is a 52 y/o F PMHx CP (nonverbal, nonambualtory, wheelchair bound at baseline) p/w seizures today.  As per DSP Aysha, pt had five separate seizures described as facial clenching and generalized convulsions lasting for 30 seconds, after which vomits nonbloody vomitus and appears lethargic thereafter.  Pt's last seizure was reportedly at 3:30 PM.  Pt has not taken any anti seizure medications today, but has taken medications otherwise.  No known falls, head trauma, fevers. Pt is a 52 y/o F PMHx CP (nonverbal, nonambualtory, wheelchair bound at baseline) p/w seizures today.  As per DSP Aysha ([phone]), pt had five separate seizures described as facial clenching and generalized convulsions lasting for 30 seconds, after which vomits nonbloody vomitus and appears lethargic thereafter.  Pt's last seizure was reportedly at 3:30 PM.  Pt has not taken any anti seizure medications today, but has taken medications otherwise.  No known falls, head trauma, fevers.

## 2019-04-22 NOTE — ED PROVIDER NOTE - PROGRESS NOTE DETAILS
NEFTALY SORENSEN:  Discussed case with CARON Ricks who states pt is a resident at [facility] and notes that pt had breakfast and seizure medications then vomited.  She notes pt's seizures started at ~1230P and had ~5 seizures 30-45 minutes apart with lethargy in between. CT concerning for enteritis, started on cipro/flagyl for treatment. then found to be hypothermic and hypotensive, started on jose hugger, IVF without improvement. then started vanc/zosyn and norepi. consulted micu, will admit

## 2019-04-22 NOTE — ED ADULT NURSE NOTE - OBJECTIVE STATEMENT
pt BIBA from group home, pt accompanied by aide, as per EMS, pt had 5 seizures today with vomiting.   Pt to room 6 with aide at bedside. Pt is uncooperative and combative when attempting care. Pt evaluated by MD. IM Versed given and IVL placed to left thumb. Labs drawn and sent. Pt receiving medication and fluid as ordered. Will continue to monitor. Waiting for further orders and dispo.   CARON Shah

## 2019-04-22 NOTE — ED ADULT NURSE REASSESSMENT NOTE - NS ED NURSE REASSESS COMMENT FT1
Report received from CARON Proctor. Pt. returned from CT. Pt. received nonverbal, with 22 gauge IV in left hand, blanchable redness noted to sacrum, excoriation noted to thighs beside vaginal area, and medication infusing as per order. Aide at bedside. No new orders at this time. Will continue to monitor. Report received from CARON Proctor. Pt. returned from CT. Pt. received nonverbal, with 22 gauge IV in left hand, blanchable redness noted to sacrum, excoriation noted to thighs beside vaginal area and bilateral buttocks, and medication infusing as per order. Aide at bedside. No new orders at this time. Will continue to monitor. Report received from CARON Proctor. Pt. returned from CT. Pt. received nonverbal, with 22 gauge IV in left hand, upper and lower extremities contracted, blanchable redness noted to sacrum, excoriation noted to thighs beside vaginal area and bilateral buttocks, and medication infusing as per order. Aide at bedside. No new orders at this time. Will continue to monitor.

## 2019-04-22 NOTE — ED ADULT TRIAGE NOTE - CHIEF COMPLAINT QUOTE
pt BIBA from group home, pt accompanied by aide, as per EMS, pt had 5 seizures today with vomiting.  unable to obtain temp in triage.  pt non-verbal at baseline

## 2019-04-22 NOTE — ED PROVIDER NOTE - UNABLE TO OBTAIN
Unresponsive Pt unable to provide information 2/2 nonverbal state at baseline.  History entirely obtained from EMR and DSP Aysha from pt's group home.

## 2019-04-22 NOTE — CONSULT NOTE ADULT - ASSESSMENT
53 year old female presenting from group home with multiple seizures. Patient has PMH of cerebral palsy (nonverbal and bedbound at baseline), seizures, intellectual disability. As per ED, patient had 5 seizures described as facial clenching and generalized convulsions which lasts about 30 seconds. Patient would vomit and appear lethargic afterwards. Unclear if patient would return to baseline between each episode, last episode was at 3:30pm. Patient missed medications today due to vomiting and lethargy.  Neuro exam limited due to cooperation. Alert, awake, nonverbal, not following commands. Moving upper extremities spontaneously and strong; LE contracted.   VPA level 63.6    Breakthrough seizure of unclear etiology    Recommend:  Loaded with keppra 1 g  Loaded with depakote 20mg/kg  Continue keppra 1000 TID  Increase depakote to 750 TID  1:1 IV conversion for both if patient unable to tolerate PO  Rule out infectious/metabolic derangements  Routine EEG 53 year old female presenting from group home with multiple seizures. Patient has PMH of cerebral palsy (nonverbal and bedbound at baseline), seizures, intellectual disability. As per ED, patient had 5 seizures described as facial clenching and generalized convulsions which lasts about 30 seconds. Patient would vomit and appear lethargic afterwards. Unclear if patient would return to baseline between each episode, last episode was at 3:30pm. Patient missed medications today due to vomiting and lethargy.  Neuro exam limited due to cooperation. Alert, awake, nonverbal, not following commands. Moving upper extremities spontaneously and strong; LE contracted.   VPA level 63.6    Breakthrough seizure of unclear etiology    Recommend:  Loaded with keppra 1 g  Loaded with depakote 20mg/kg  Continue keppra 1000 TID  Increase depakote to 750 TID  Check depakote level in AM  1:1 IV conversion for both if patient unable to tolerate PO  Rule out infectious/metabolic derangements  CTH  Routine EEG 53 year old female presenting from group home with multiple seizures. Patient has PMH of cerebral palsy (nonverbal and bedbound at baseline), seizures, intellectual disability. As per ED, patient had 5 seizures described as facial clenching and generalized convulsions which lasts about 30 seconds. Patient would vomit and appear lethargic afterwards. Unclear if patient would return to baseline between each episode, last episode was at 3:30pm. Patient missed medications today due to vomiting and lethargy.  Neuro exam limited due to cooperation. Alert, awake, nonverbal, not following commands. Moving upper extremities spontaneously and strong; LE contracted.   VPA level 63.6    Status epilepticus of unclear etiology, possibly secondary to emesis and missing medications    Recommend:  Loaded with keppra 1 g  Loaded with depakote 20mg/kg  Continue keppra 1000 TID  Increase depakote to 750 TID  Check depakote level in AM  1:1 IV conversion for both if patient unable to tolerate PO  Rule out infectious/metabolic derangements  CTH  Routine EEG  Ativan 2 mg IV for seizure activity >3 minutes  Low threshold for MICU evaluation as patient had multiple epileptic events in 1 day

## 2019-04-23 DIAGNOSIS — G40.909 EPILEPSY, UNSPECIFIED, NOT INTRACTABLE, WITHOUT STATUS EPILEPTICUS: ICD-10-CM

## 2019-04-23 LAB
ALBUMIN SERPL ELPH-MCNC: 2.7 G/DL — LOW (ref 3.3–5)
ALP SERPL-CCNC: 53 U/L — SIGNIFICANT CHANGE UP (ref 40–120)
ALT FLD-CCNC: 28 U/L — SIGNIFICANT CHANGE UP (ref 4–33)
ANION GAP SERPL CALC-SCNC: 14 MMO/L — SIGNIFICANT CHANGE UP (ref 7–14)
AST SERPL-CCNC: 43 U/L — HIGH (ref 4–32)
B PERT DNA SPEC QL NAA+PROBE: NOT DETECTED — SIGNIFICANT CHANGE UP
BASE EXCESS BLDA CALC-SCNC: 1.4 MMOL/L — SIGNIFICANT CHANGE UP
BASE EXCESS BLDV CALC-SCNC: 3.8 MMOL/L — SIGNIFICANT CHANGE UP
BASOPHILS # BLD AUTO: 0.04 K/UL — SIGNIFICANT CHANGE UP (ref 0–0.2)
BASOPHILS NFR BLD AUTO: 0.8 % — SIGNIFICANT CHANGE UP (ref 0–2)
BILIRUB SERPL-MCNC: 0.2 MG/DL — SIGNIFICANT CHANGE UP (ref 0.2–1.2)
BLOOD GAS VENOUS - CREATININE: 0.5 MG/DL — SIGNIFICANT CHANGE UP (ref 0.5–1.3)
BUN SERPL-MCNC: 15 MG/DL — SIGNIFICANT CHANGE UP (ref 7–23)
C PNEUM DNA SPEC QL NAA+PROBE: NOT DETECTED — SIGNIFICANT CHANGE UP
CALCIUM SERPL-MCNC: 7.7 MG/DL — LOW (ref 8.4–10.5)
CHLORIDE BLDA-SCNC: 109 MMOL/L — HIGH (ref 96–108)
CHLORIDE BLDV-SCNC: 109 MMOL/L — HIGH (ref 96–108)
CHLORIDE SERPL-SCNC: 107 MMOL/L — SIGNIFICANT CHANGE UP (ref 98–107)
CO2 SERPL-SCNC: 20 MMOL/L — LOW (ref 22–31)
CORTIS SERPL-MCNC: 14.1 UG/DL — SIGNIFICANT CHANGE UP (ref 2.7–18.4)
CREAT SERPL-MCNC: 0.51 MG/DL — SIGNIFICANT CHANGE UP (ref 0.5–1.3)
EOSINOPHIL # BLD AUTO: 0.16 K/UL — SIGNIFICANT CHANGE UP (ref 0–0.5)
EOSINOPHIL NFR BLD AUTO: 3.1 % — SIGNIFICANT CHANGE UP (ref 0–6)
FLUAV H1 2009 PAND RNA SPEC QL NAA+PROBE: NOT DETECTED — SIGNIFICANT CHANGE UP
FLUAV H1 RNA SPEC QL NAA+PROBE: NOT DETECTED — SIGNIFICANT CHANGE UP
FLUAV H3 RNA SPEC QL NAA+PROBE: NOT DETECTED — SIGNIFICANT CHANGE UP
FLUAV SUBTYP SPEC NAA+PROBE: NOT DETECTED — SIGNIFICANT CHANGE UP
FLUBV RNA SPEC QL NAA+PROBE: NOT DETECTED — SIGNIFICANT CHANGE UP
GAS PNL BLDV: 141 MMOL/L — SIGNIFICANT CHANGE UP (ref 136–146)
GLUCOSE BLDA-MCNC: 149 MG/DL — HIGH (ref 70–99)
GLUCOSE BLDV-MCNC: 96 — SIGNIFICANT CHANGE UP (ref 70–99)
GLUCOSE SERPL-MCNC: 129 MG/DL — HIGH (ref 70–99)
HADV DNA SPEC QL NAA+PROBE: NOT DETECTED — SIGNIFICANT CHANGE UP
HCO3 BLDA-SCNC: 25 MMOL/L — SIGNIFICANT CHANGE UP (ref 22–26)
HCO3 BLDV-SCNC: 27 MMOL/L — SIGNIFICANT CHANGE UP (ref 20–27)
HCOV PNL SPEC NAA+PROBE: SIGNIFICANT CHANGE UP
HCT VFR BLD CALC: 38.5 % — SIGNIFICANT CHANGE UP (ref 34.5–45)
HCT VFR BLDA CALC: 35.8 % — SIGNIFICANT CHANGE UP (ref 34.5–46.5)
HCT VFR BLDV CALC: 32.5 % — LOW (ref 34.5–45)
HGB BLD-MCNC: 11.6 G/DL — SIGNIFICANT CHANGE UP (ref 11.5–15.5)
HGB BLDA-MCNC: 11.6 G/DL — SIGNIFICANT CHANGE UP (ref 11.5–15.5)
HGB BLDV-MCNC: 10.5 G/DL — LOW (ref 11.5–15.5)
HMPV RNA SPEC QL NAA+PROBE: NOT DETECTED — SIGNIFICANT CHANGE UP
HPIV1 RNA SPEC QL NAA+PROBE: NOT DETECTED — SIGNIFICANT CHANGE UP
HPIV2 RNA SPEC QL NAA+PROBE: NOT DETECTED — SIGNIFICANT CHANGE UP
HPIV3 RNA SPEC QL NAA+PROBE: NOT DETECTED — SIGNIFICANT CHANGE UP
HPIV4 RNA SPEC QL NAA+PROBE: NOT DETECTED — SIGNIFICANT CHANGE UP
IMM GRANULOCYTES NFR BLD AUTO: 0.6 % — SIGNIFICANT CHANGE UP (ref 0–1.5)
LACTATE BLDA-SCNC: 0.9 MMOL/L — SIGNIFICANT CHANGE UP (ref 0.5–2)
LACTATE BLDV-MCNC: 1 MMOL/L — SIGNIFICANT CHANGE UP (ref 0.5–2)
LYMPHOCYTES # BLD AUTO: 2.25 K/UL — SIGNIFICANT CHANGE UP (ref 1–3.3)
LYMPHOCYTES # BLD AUTO: 43.1 % — SIGNIFICANT CHANGE UP (ref 13–44)
MAGNESIUM SERPL-MCNC: 1.6 MG/DL — SIGNIFICANT CHANGE UP (ref 1.6–2.6)
MCHC RBC-ENTMCNC: 30.1 % — LOW (ref 32–36)
MCHC RBC-ENTMCNC: 31.4 PG — SIGNIFICANT CHANGE UP (ref 27–34)
MCV RBC AUTO: 104.3 FL — HIGH (ref 80–100)
MONOCYTES # BLD AUTO: 0.89 K/UL — SIGNIFICANT CHANGE UP (ref 0–0.9)
MONOCYTES NFR BLD AUTO: 17 % — HIGH (ref 2–14)
NEUTROPHILS # BLD AUTO: 1.85 K/UL — SIGNIFICANT CHANGE UP (ref 1.8–7.4)
NEUTROPHILS NFR BLD AUTO: 35.4 % — LOW (ref 43–77)
NRBC # FLD: 0 K/UL — SIGNIFICANT CHANGE UP (ref 0–0)
PCO2 BLDA: 54 MMHG — HIGH (ref 32–48)
PCO2 BLDV: 65 MMHG — HIGH (ref 41–51)
PH BLDA: 7.32 PH — LOW (ref 7.35–7.45)
PH BLDV: 7.28 PH — LOW (ref 7.32–7.43)
PHOSPHATE SERPL-MCNC: 2.6 MG/DL — SIGNIFICANT CHANGE UP (ref 2.5–4.5)
PLATELET # BLD AUTO: 213 K/UL — SIGNIFICANT CHANGE UP (ref 150–400)
PMV BLD: 11.3 FL — SIGNIFICANT CHANGE UP (ref 7–13)
PO2 BLDA: 73 MMHG — LOW (ref 83–108)
PO2 BLDV: 67 MMHG — HIGH (ref 35–40)
POTASSIUM BLDA-SCNC: 3.4 MMOL/L — SIGNIFICANT CHANGE UP (ref 3.4–4.5)
POTASSIUM BLDV-SCNC: 3.3 MMOL/L — LOW (ref 3.4–4.5)
POTASSIUM SERPL-MCNC: 3.9 MMOL/L — SIGNIFICANT CHANGE UP (ref 3.5–5.3)
POTASSIUM SERPL-SCNC: 3.9 MMOL/L — SIGNIFICANT CHANGE UP (ref 3.5–5.3)
PROT SERPL-MCNC: 5.7 G/DL — LOW (ref 6–8.3)
RBC # BLD: 3.69 M/UL — LOW (ref 3.8–5.2)
RBC # FLD: 15.6 % — HIGH (ref 10.3–14.5)
RSV RNA SPEC QL NAA+PROBE: NOT DETECTED — SIGNIFICANT CHANGE UP
RV+EV RNA SPEC QL NAA+PROBE: NOT DETECTED — SIGNIFICANT CHANGE UP
SAO2 % BLDA: 93.2 % — LOW (ref 95–99)
SAO2 % BLDV: 89.7 % — HIGH (ref 60–85)
SODIUM BLDA-SCNC: 140 MMOL/L — SIGNIFICANT CHANGE UP (ref 136–146)
SODIUM SERPL-SCNC: 141 MMOL/L — SIGNIFICANT CHANGE UP (ref 135–145)
TSH SERPL-MCNC: 1.19 UIU/ML — SIGNIFICANT CHANGE UP (ref 0.27–4.2)
TSH SERPL-MCNC: 2.68 UIU/ML — SIGNIFICANT CHANGE UP (ref 0.27–4.2)
VALPROATE SERPL-MCNC: 62.8 UG/ML — SIGNIFICANT CHANGE UP (ref 50–100)
WBC # BLD: 5.22 K/UL — SIGNIFICANT CHANGE UP (ref 3.8–10.5)
WBC # FLD AUTO: 5.22 K/UL — SIGNIFICANT CHANGE UP (ref 3.8–10.5)

## 2019-04-23 PROCEDURE — 99291 CRITICAL CARE FIRST HOUR: CPT

## 2019-04-23 PROCEDURE — 99223 1ST HOSP IP/OBS HIGH 75: CPT

## 2019-04-23 PROCEDURE — 95816 EEG AWAKE AND DROWSY: CPT | Mod: 26

## 2019-04-23 PROCEDURE — 74177 CT ABD & PELVIS W/CONTRAST: CPT | Mod: 26

## 2019-04-23 RX ORDER — METRONIDAZOLE 500 MG
500 TABLET ORAL ONCE
Refills: 0 | Status: DISCONTINUED | OUTPATIENT
Start: 2019-04-23 | End: 2019-04-23

## 2019-04-23 RX ORDER — OLANZAPINE 15 MG/1
5 TABLET, FILM COATED ORAL AT BEDTIME
Refills: 0 | Status: DISCONTINUED | OUTPATIENT
Start: 2019-04-23 | End: 2019-04-25

## 2019-04-23 RX ORDER — SODIUM CHLORIDE 9 MG/ML
1000 INJECTION INTRAMUSCULAR; INTRAVENOUS; SUBCUTANEOUS ONCE
Refills: 0 | Status: COMPLETED | OUTPATIENT
Start: 2019-04-23 | End: 2019-04-23

## 2019-04-23 RX ORDER — CIPROFLOXACIN LACTATE 400MG/40ML
400 VIAL (ML) INTRAVENOUS ONCE
Refills: 0 | Status: COMPLETED | OUTPATIENT
Start: 2019-04-23 | End: 2019-04-23

## 2019-04-23 RX ORDER — FENTANYL CITRATE 50 UG/ML
50 INJECTION INTRAVENOUS ONCE
Refills: 0 | Status: DISCONTINUED | OUTPATIENT
Start: 2019-04-23 | End: 2019-04-23

## 2019-04-23 RX ORDER — PANTOPRAZOLE SODIUM 20 MG/1
40 TABLET, DELAYED RELEASE ORAL
Refills: 0 | Status: DISCONTINUED | OUTPATIENT
Start: 2019-04-23 | End: 2019-04-23

## 2019-04-23 RX ORDER — BENZTROPINE MESYLATE 1 MG
1 TABLET ORAL AT BEDTIME
Refills: 0 | Status: DISCONTINUED | OUTPATIENT
Start: 2019-04-23 | End: 2019-04-25

## 2019-04-23 RX ORDER — LEVETIRACETAM 250 MG/1
1000 TABLET, FILM COATED ORAL
Refills: 0 | Status: DISCONTINUED | OUTPATIENT
Start: 2019-04-23 | End: 2019-04-24

## 2019-04-23 RX ORDER — OLANZAPINE 15 MG/1
2.5 TABLET, FILM COATED ORAL EVERY 8 HOURS
Refills: 0 | Status: DISCONTINUED | OUTPATIENT
Start: 2019-04-23 | End: 2019-04-23

## 2019-04-23 RX ORDER — VALPROIC ACID (AS SODIUM SALT) 250 MG/5ML
750 SOLUTION, ORAL ORAL EVERY 8 HOURS
Refills: 0 | Status: DISCONTINUED | OUTPATIENT
Start: 2019-04-23 | End: 2019-04-24

## 2019-04-23 RX ORDER — VANCOMYCIN HCL 1 G
1000 VIAL (EA) INTRAVENOUS ONCE
Refills: 0 | Status: COMPLETED | OUTPATIENT
Start: 2019-04-23 | End: 2019-04-23

## 2019-04-23 RX ORDER — CIPROFLOXACIN LACTATE 400MG/40ML
400 VIAL (ML) INTRAVENOUS EVERY 12 HOURS
Refills: 0 | Status: DISCONTINUED | OUTPATIENT
Start: 2019-04-23 | End: 2019-04-24

## 2019-04-23 RX ORDER — NOREPINEPHRINE BITARTRATE/D5W 8 MG/250ML
0.05 PLASTIC BAG, INJECTION (ML) INTRAVENOUS
Qty: 8 | Refills: 0 | Status: DISCONTINUED | OUTPATIENT
Start: 2019-04-23 | End: 2019-04-23

## 2019-04-23 RX ORDER — VANCOMYCIN HCL 1 G
1000 VIAL (EA) INTRAVENOUS EVERY 12 HOURS
Refills: 0 | Status: DISCONTINUED | OUTPATIENT
Start: 2019-04-23 | End: 2019-04-23

## 2019-04-23 RX ORDER — PIPERACILLIN AND TAZOBACTAM 4; .5 G/20ML; G/20ML
3.38 INJECTION, POWDER, LYOPHILIZED, FOR SOLUTION INTRAVENOUS ONCE
Refills: 0 | Status: COMPLETED | OUTPATIENT
Start: 2019-04-23 | End: 2019-04-23

## 2019-04-23 RX ORDER — OLANZAPINE 15 MG/1
7.5 TABLET, FILM COATED ORAL AT BEDTIME
Refills: 0 | Status: DISCONTINUED | OUTPATIENT
Start: 2019-04-23 | End: 2019-04-23

## 2019-04-23 RX ORDER — PIPERACILLIN AND TAZOBACTAM 4; .5 G/20ML; G/20ML
3.38 INJECTION, POWDER, LYOPHILIZED, FOR SOLUTION INTRAVENOUS EVERY 8 HOURS
Refills: 0 | Status: DISCONTINUED | OUTPATIENT
Start: 2019-04-23 | End: 2019-04-23

## 2019-04-23 RX ORDER — METRONIDAZOLE 500 MG
500 TABLET ORAL EVERY 8 HOURS
Refills: 0 | Status: DISCONTINUED | OUTPATIENT
Start: 2019-04-23 | End: 2019-04-24

## 2019-04-23 RX ORDER — CHLORHEXIDINE GLUCONATE 213 G/1000ML
1 SOLUTION TOPICAL
Refills: 0 | Status: DISCONTINUED | OUTPATIENT
Start: 2019-04-23 | End: 2019-04-24

## 2019-04-23 RX ORDER — SODIUM CHLORIDE 9 MG/ML
1000 INJECTION, SOLUTION INTRAVENOUS
Refills: 0 | Status: DISCONTINUED | OUTPATIENT
Start: 2019-04-23 | End: 2019-04-24

## 2019-04-23 RX ORDER — OLANZAPINE 15 MG/1
5 TABLET, FILM COATED ORAL ONCE
Refills: 0 | Status: COMPLETED | OUTPATIENT
Start: 2019-04-23 | End: 2019-04-23

## 2019-04-23 RX ORDER — ENOXAPARIN SODIUM 100 MG/ML
40 INJECTION SUBCUTANEOUS DAILY
Refills: 0 | Status: DISCONTINUED | OUTPATIENT
Start: 2019-04-23 | End: 2019-04-26

## 2019-04-23 RX ADMIN — Medication 250 MILLIGRAM(S): at 06:23

## 2019-04-23 RX ADMIN — SODIUM CHLORIDE 75 MILLILITER(S): 9 INJECTION, SOLUTION INTRAVENOUS at 22:48

## 2019-04-23 RX ADMIN — Medication 200 MILLIGRAM(S): at 03:52

## 2019-04-23 RX ADMIN — ENOXAPARIN SODIUM 40 MILLIGRAM(S): 100 INJECTION SUBCUTANEOUS at 13:10

## 2019-04-23 RX ADMIN — Medication 100 MILLIGRAM(S): at 22:48

## 2019-04-23 RX ADMIN — SODIUM CHLORIDE 2000 MILLILITER(S): 9 INJECTION INTRAMUSCULAR; INTRAVENOUS; SUBCUTANEOUS at 02:25

## 2019-04-23 RX ADMIN — FENTANYL CITRATE 50 MICROGRAM(S): 50 INJECTION INTRAVENOUS at 07:05

## 2019-04-23 RX ADMIN — OLANZAPINE 5 MILLIGRAM(S): 15 TABLET, FILM COATED ORAL at 10:47

## 2019-04-23 RX ADMIN — PIPERACILLIN AND TAZOBACTAM 200 GRAM(S): 4; .5 INJECTION, POWDER, LYOPHILIZED, FOR SOLUTION INTRAVENOUS at 05:40

## 2019-04-23 RX ADMIN — OLANZAPINE 5 MILLIGRAM(S): 15 TABLET, FILM COATED ORAL at 17:00

## 2019-04-23 RX ADMIN — SODIUM CHLORIDE 2000 MILLILITER(S): 9 INJECTION INTRAMUSCULAR; INTRAVENOUS; SUBCUTANEOUS at 04:22

## 2019-04-23 RX ADMIN — LEVETIRACETAM 400 MILLIGRAM(S): 250 TABLET, FILM COATED ORAL at 18:04

## 2019-04-23 RX ADMIN — Medication 28.75 MILLIGRAM(S): at 22:56

## 2019-04-23 RX ADMIN — Medication 1 MILLIGRAM(S): at 22:49

## 2019-04-23 RX ADMIN — SODIUM CHLORIDE 75 MILLILITER(S): 9 INJECTION, SOLUTION INTRAVENOUS at 09:57

## 2019-04-23 RX ADMIN — Medication 6.75 MICROGRAM(S)/KG/MIN: at 07:30

## 2019-04-23 RX ADMIN — CHLORHEXIDINE GLUCONATE 1 APPLICATION(S): 213 SOLUTION TOPICAL at 13:10

## 2019-04-23 RX ADMIN — Medication 6.75 MICROGRAM(S)/KG/MIN: at 04:56

## 2019-04-23 RX ADMIN — OLANZAPINE 5 MILLIGRAM(S): 15 TABLET, FILM COATED ORAL at 22:49

## 2019-04-23 NOTE — ED ADULT NURSE REASSESSMENT NOTE - NS ED NURSE REASSESS COMMENT FT1
zosyn infusing with levo drip into same IV access, confirmed with MD Gagnon and pharmacy that the 2 are compatible

## 2019-04-23 NOTE — H&P ADULT - ASSESSMENT
53F with h/o CP (non-verbal, non-ambulatory, wheelchair-bound at baseline) presented from group home w/seizures and vomiting, found to be hypotensive and hypothermic, admitted with septic shock requiring pressors likely 2/2 enteritis seen on CT A/P.     #Neuro  Non-verbal at baseline, admitted for multiple seizures, possibly in setting of infection vs missed medications in setting of vomiting    - AAOx0, currently arousable, seen by neurology   - will c/w Keppra 1000 TID, Depakote 750 TID per neuro recs  - check Depakote level in AM   - EEG   - ativan 2mg IV PRN for seizure activity >3min  - neuro checks      #Resp  - satting well on NC, protecting airway     #CVD  Hypotensive requiring pressors likely in setting of septic shock 2/2 enteritis  - s/p 3L NS, currently on levophed titrate to MAP>65  - sinus lópez in setting of multiple AEDs    #GI  Episodes of emesis, CT AP with evidence of enteritis   - NPO for now in setting of lethargy and risk of aspiration     #Renal  - no issues     #ID  Hypothermic and hypotensive, a/w septic shock with CT and clinical evidence of enteritis   - c/w broad spectrum abx   - RVP pending, BCx ordered  - UA negative, follow up UCx   - CXR with no acute pathology    #Endo  Hypothermic and hypotensive  - will obtain TSH and AM cortisol      #Heme  - no issues     #Skin    #DVT ppx  - lovenox

## 2019-04-23 NOTE — H&P ADULT - ATTENDING COMMENTS
pt is a 54 yo female with hx cp , seizures who presented with several episodes of  seizures, noted to  have hypotension, started on norepinephrine.  Ct of the abdomen  shows enteritis. Elevated wbc, bp 90/60 p 84 rr 18 heent no jvd, lungs few rhonchi  heart s1s2 abdomen obese , ext no edema, neuro nonverbal , moves all ext.  labs noted,    -54 yo female with seizure disorder , enteritis and hypotension  -panculture, blood, urine, continue cipro, flagyl,  -taper off norepinephrine,  -keppra, valproate,  -monitor hemodynamically  critically ill

## 2019-04-23 NOTE — H&P ADULT - NSHPPHYSICALEXAM_GEN_ALL_CORE
ICU Vital Signs Last 24 Hrs  T(C): 34.7 (23 Apr 2019 05:36), Max: 37 (22 Apr 2019 17:05)  T(F): 94.5 (23 Apr 2019 05:36), Max: 98.6 (22 Apr 2019 17:05)  HR: 50 (23 Apr 2019 05:53) (46 - 100)  BP: 90/44 (23 Apr 2019 05:53) (73/52 - 123/77)  BP(mean): 60 (23 Apr 2019 05:53) (56 - 88)  RR: 18 (23 Apr 2019 04:23) (16 - 20)  SpO2: 100% (23 Apr 2019 04:23) (95% - 100%)    PHYSICAL EXAM:  GENERAL: Obtunded   HEAD:    EYES:   NECK:   CHEST/LUNG:   HEART:   ABDOMEN:   EXTREMITIES:    NEUROLOGY: A&O x0, arousable  SKIN: ICU Vital Signs Last 24 Hrs  T(C): 34.7 (23 Apr 2019 05:36), Max: 37 (22 Apr 2019 17:05)  T(F): 94.5 (23 Apr 2019 05:36), Max: 98.6 (22 Apr 2019 17:05)  HR: 50 (23 Apr 2019 05:53) (46 - 100)  BP: 90/44 (23 Apr 2019 05:53) (73/52 - 123/77)  BP(mean): 60 (23 Apr 2019 05:53) (56 - 88)  RR: 18 (23 Apr 2019 04:23) (16 - 20)  SpO2: 100% (23 Apr 2019 04:23) (95% - 100%)    PHYSICAL EXAM:  GENERAL: Obtunded   HEAD: NCAT   EYES: PERRL, EOMI  NECK: supple  CHEST/LUNG: CTAB, no wheeze or crackles   HEART: S1 S2, rr, no murmurs  ABDOMEN: soft, NTND, BS+  EXTREMITIES: LE contracted, edema in b/l feet, palpable DP pulses    NEUROLOGY: AAOx0, awake and alert, moves UE  SKIN: no ulcers or rashes

## 2019-04-23 NOTE — ED ADULT NURSE REASSESSMENT NOTE - NS ED NURSE REASSESS COMMENT FT1
Report received from break coverage CARON Purvis. Pt. returned from CT. Pt. received with 20 gauge ultrasound guided IV in right upper arm placed by MD Ordonez. VS as noted. MD Gagnon made aware of pt. vital signs. Awaiting new orders at present. Warm blankets provided. Pt. appears comfortable. Will continue to monitor. Report received from break coverage CARON Purvis I. Pt. returned from CT. Pt. received with 20 gauge ultrasound guided IV in right upper arm placed by MD Ordonez, positive blood return, flushes without difficulty. VS as noted. MD Gagnon made aware of pt. vital signs. Awaiting new orders at present. Warm blankets provided. Pt. placed on jose hugger due to rectal temperature. Pt. changed, turned, and repositioned. Continues to take off pulse ox. Pt. appears comfortable. Will continue to monitor.

## 2019-04-23 NOTE — H&P ADULT - NSHPLABSRESULTS_GEN_ALL_CORE
13.1   5.09  )-----------( 220      ( 2019 18:30 )             42.3     141  |  101  |  19  ----------------------------<  100<H>  4.0   |  26  |  0.57    Ca    9.2      2019 18:30    TPro  6.9  /  Alb  3.2<L>  /  TBili  0.3  /  DBili  x   /  AST  36<H>  /  ALT  28  /  AlkPhos  63  04-22    Ammonia, Serum (19 @ 19:25): 55    Valproic Acid Level, Serum (19 @ 18:30): 63.6 ug/mL    Urinalysis Basic - ( 2019 20:05 )  Color: YELLOW / Appearance: CLEAR / S.038 / pH: 6.0  Gluc: NEGATIVE / Ketone: 20  / Bili: NEGATIVE / Urobili: 2   Blood: NEGATIVE / Protein: 30 / Nitrite: NEGATIVE   Leuk Esterase: NEGATIVE / RBC: 0-2 / WBC 3-5   Sq Epi: x / Non Sq Epi: FEW / Bacteria: FEW    Blood Gas Venous Comprehensive (19 @ 04:14)    Blood Gas Venous - Lactate: 1.0: Please note updated reference range. mmol/L    pH, Venous: 7.28 pH    pCO2, Venous: 65 mmHg    pO2, Venous: 67 mmHg    HCO3, Venous: 27 mmol/L    Oxygen Saturation, Venous: 89.7 %    EKG:    < from: Xray Chest 1 View- PORTABLE-Urgent (19 @ 19:36) >  IMPRESSION:  Low lung volumes.  Redemonstrated uniform hazy opacity in peripheral lower left hemithorax   with obscured left CP angle which could be related to pleural reaction or   prominent epicardial fat.  Sharp right CP angle. Clear remaining visualized lungs. No pneumothorax.  Heart size and mediastinal width inaccurately assessed on the projection   but appear grossly stable.  Trachea midline.  Redemonstrated mild spinal degenerative change. No acute or focally   aggressive appearing osseous abnormalities.    < from: CT Abdomen and Pelvis w/ IV Cont (19 @ 01:37) >  IMPRESSION:  Mild thickening of proximal jejunal loops with associated mild mesenteric   edema likely reflective of an enteritis.    < from: CT Head No Cont (19 @ 20:53) >  IMPRESSION:   No gross acute intracranial findings. No interval change compared to   prior exam from 2019.

## 2019-04-23 NOTE — EEG REPORT - NS EEG TEXT BOX
NAYANA JACQUES [id number]     Study Date: 		04-23-19 extended EEG > 1hr    ROUTINE EEG    Technical Information:			  		  Placement and Labeling of Electrodes:  The EEG was performed utilizing 20 channels referential EEG connections (coronal over temporal over parasagittal montage) using all standard 10-20 electrode placements with EKG.  Recording was at a sampling rate of 256 samples per second per channel.  Time synchronized digital video recording was done simultaneously with EEG recording.  A low light infrared camera was used for low light recording.  Valerio and seizure detection algorithms were utilized.    CSA Technical Component:  Quantitative EEG analysis using a separate Compressed Spectral Array (CSA) software package was conducted in real-time and run at bedside after set up by the technician, digitally displaying the power of electrographic frequencies included in the 1-30Hz band using a graded color map.  This data was reviewed and interpreted independently, and is reported in a separate section below.    --------------------------------------------------------------------------------------------------  History:  CC/ HPI Patient is a 53y old  Female who presents with a chief complaint of seizures (23 Apr 2019 05:51)    MEDICATIONS  (STANDING):  benztropine 1 milliGRAM(s) Oral at bedtime  chlorhexidine 4% Liquid 1 Application(s) Topical <User Schedule>  ciprofloxacin   IVPB 400 milliGRAM(s) IV Intermittent every 12 hours  enoxaparin Injectable 40 milliGRAM(s) SubCutaneous daily  lactated ringers. 1000 milliLiter(s) (75 mL/Hr) IV Continuous <Continuous>  levETIRAcetam  IVPB 1000 milliGRAM(s) IV Intermittent <User Schedule>  metroNIDAZOLE  IVPB 500 milliGRAM(s) IV Intermittent every 8 hours  norepinephrine Infusion 0.05 MICROgram(s)/kG/Min (6.75 mL/Hr) IV Continuous <Continuous>  OLANZapine Disintegrating Tablet 5 milliGRAM(s) Oral at bedtime  valproate sodium IVPB 750 milliGRAM(s) IV Intermittent every 8 hours    --------------------------------------------------------------------------------------------------  Study Interpretation:    Patient was not cooperative.  3 Technicians required to hook patient up due to issues with cooperation, cognitive limitations.    FINDINGS:  The background was continuous, spontaneously variable and reactive.  During wakefulness, the posteriorly dominant rhythm was absent with mostly admixed irregular delta and some central theta near 5hz    Sleep Background:  Stage II sleep transients were not recorded.    Epileptiform Activity:   No epileptiform discharges were present.    Events:  Frequent abrupt jerky head or limb movements were recorded.  Report of staring per EEG technician.  No seizures were recorded.    Activation Procedures:   -Hyperventilation was not performed.    -Photic stimulation was not performed.    Artifacts:  Intermittent myogenic and movement artifacts were noted.    ECG:  The heart rate on single channel ECG at baseline was predominantly near BPM = 54-60  -----------------------------------------------------------------------------------------------------    EEG Classification / Summary:  Abnormal EEG study, awake   Moderate slowing  Some jerky movements, possible staring spell.  No EEG abnormalities during these.  -----------------------------------------------------------------------------------------------------    Clinical Impression:  Moderate diffuse cerebral dysfunction.  There were no epileptiform abnormalities recorded, including during events noted above.    EEG intermittently limited due to poor cooperation.    -------------------------------------------------------------------------------------------------------  Rahat Brooks M.D.   of Neurology, [facility] [place]

## 2019-04-23 NOTE — ED ADULT NURSE REASSESSMENT NOTE - NS ED NURSE REASSESS COMMENT FT1
Pt. is admitted to MICU, report given to MICU RN Josey COHEN remains at bedside and levo infusing as per order. Receiving RN made aware pt. is hypotensive, hypothermic, and had episode of bradycardia.  Pt. will be transported on zoll, with ED tech and emma RN at bedside.

## 2019-04-23 NOTE — ED ADULT NURSE REASSESSMENT NOTE - NS ED NURSE REASSESS COMMENT FT1
MICU MD at bedside for consult, pt responsive to sternal rub and visual stimuli, HR as charted, will monitor closely

## 2019-04-23 NOTE — H&P ADULT - HISTORY OF PRESENT ILLNESS
53F with h/o CP (non-verbal, non-ambulatory, wheelchair-bound at baseline) presenting from care home with seizures. Information from charts. Patient reported to have 5 separate seizures described as "facial clenching and generalized convulsions" lasting 30 sec, with episodes of NB emesis afterwards. Last seizure at 3:30PM. No known falls, head trauma, or fever. Of note, patient with hospitalization (3/19-4/9) for seizures 2/2 medication noncompliance.     In the ED:   Vitals: 98.6F (rectal), , 108/85, RR 20, 97% on RA  Notable Labs and Imaging: Negative UA, CTH negative, CT A/P showing mild thickening of proximal jejunal loops with associated mild mesenteric edema likely reflective of an enteritis.  Given: 3L NS, Versed 7mg IM, Keppra 1g, valproate 1440mg IV, CTX 400mg x1, zosyn x1.     Patient became hypotensive to 77/31, hypothermic to 95.3F, and lópez to 40s. Was started on levophed, bear-hugger was ordered.

## 2019-04-24 DIAGNOSIS — Z29.9 ENCOUNTER FOR PROPHYLACTIC MEASURES, UNSPECIFIED: ICD-10-CM

## 2019-04-24 DIAGNOSIS — K52.9 NONINFECTIVE GASTROENTERITIS AND COLITIS, UNSPECIFIED: ICD-10-CM

## 2019-04-24 DIAGNOSIS — A41.9 SEPSIS, UNSPECIFIED ORGANISM: ICD-10-CM

## 2019-04-24 DIAGNOSIS — G40.909 EPILEPSY, UNSPECIFIED, NOT INTRACTABLE, WITHOUT STATUS EPILEPTICUS: ICD-10-CM

## 2019-04-24 LAB
BACTERIA UR CULT: SIGNIFICANT CHANGE UP
SPECIMEN SOURCE: SIGNIFICANT CHANGE UP

## 2019-04-24 PROCEDURE — 99232 SBSQ HOSP IP/OBS MODERATE 35: CPT | Mod: GC

## 2019-04-24 PROCEDURE — 95951: CPT | Mod: 26

## 2019-04-24 RX ORDER — VALPROIC ACID (AS SODIUM SALT) 250 MG/5ML
750 SOLUTION, ORAL ORAL EVERY 8 HOURS
Refills: 0 | Status: DISCONTINUED | OUTPATIENT
Start: 2019-04-24 | End: 2019-04-25

## 2019-04-24 RX ORDER — LEVETIRACETAM 250 MG/1
1000 TABLET, FILM COATED ORAL
Refills: 0 | Status: DISCONTINUED | OUTPATIENT
Start: 2019-04-24 | End: 2019-04-25

## 2019-04-24 RX ORDER — LEVETIRACETAM 250 MG/1
1000 TABLET, FILM COATED ORAL ONCE
Refills: 0 | Status: DISCONTINUED | OUTPATIENT
Start: 2019-04-24 | End: 2019-04-24

## 2019-04-24 RX ORDER — DEXTROSE MONOHYDRATE, SODIUM CHLORIDE, AND POTASSIUM CHLORIDE 50; .745; 4.5 G/1000ML; G/1000ML; G/1000ML
1000 INJECTION, SOLUTION INTRAVENOUS
Refills: 0 | Status: DISCONTINUED | OUTPATIENT
Start: 2019-04-24 | End: 2019-04-26

## 2019-04-24 RX ORDER — CIPROFLOXACIN LACTATE 400MG/40ML
500 VIAL (ML) INTRAVENOUS EVERY 12 HOURS
Refills: 0 | Status: DISCONTINUED | OUTPATIENT
Start: 2019-04-24 | End: 2019-04-25

## 2019-04-24 RX ORDER — VALPROIC ACID (AS SODIUM SALT) 250 MG/5ML
750 SOLUTION, ORAL ORAL ONCE
Refills: 0 | Status: DISCONTINUED | OUTPATIENT
Start: 2019-04-24 | End: 2019-04-24

## 2019-04-24 RX ORDER — METRONIDAZOLE 500 MG
500 TABLET ORAL EVERY 8 HOURS
Refills: 0 | Status: DISCONTINUED | OUTPATIENT
Start: 2019-04-24 | End: 2019-04-25

## 2019-04-24 RX ADMIN — LEVETIRACETAM 400 MILLIGRAM(S): 250 TABLET, FILM COATED ORAL at 12:57

## 2019-04-24 RX ADMIN — LEVETIRACETAM 1000 MILLIGRAM(S): 250 TABLET, FILM COATED ORAL at 22:14

## 2019-04-24 RX ADMIN — Medication 500 MILLIGRAM(S): at 18:37

## 2019-04-24 RX ADMIN — Medication 500 MILLIGRAM(S): at 22:13

## 2019-04-24 RX ADMIN — Medication 28.75 MILLIGRAM(S): at 06:27

## 2019-04-24 RX ADMIN — LEVETIRACETAM 400 MILLIGRAM(S): 250 TABLET, FILM COATED ORAL at 03:10

## 2019-04-24 RX ADMIN — Medication 100 MILLIGRAM(S): at 06:27

## 2019-04-24 RX ADMIN — Medication 750 MILLIGRAM(S): at 18:36

## 2019-04-24 RX ADMIN — Medication 100 MILLIGRAM(S): at 11:58

## 2019-04-24 RX ADMIN — ENOXAPARIN SODIUM 40 MILLIGRAM(S): 100 INJECTION SUBCUTANEOUS at 12:00

## 2019-04-24 RX ADMIN — Medication 2 MILLIGRAM(S): at 15:41

## 2019-04-24 RX ADMIN — Medication 1 MILLIGRAM(S): at 22:13

## 2019-04-24 RX ADMIN — OLANZAPINE 5 MILLIGRAM(S): 15 TABLET, FILM COATED ORAL at 22:13

## 2019-04-24 NOTE — PROGRESS NOTE ADULT - ASSESSMENT
53F with h/o CP (non-verbal, non-ambulatory, wheelchair-bound at baseline) presented from group home w/seizures and vomiting, found to be hypotensive and hypothermic, admitted with septic shock requiring pressors likely 2/2 enteritis seen on CT A/P. 53F with h/o CP (non-verbal, non-ambulatory, wheelchair-bound at baseline) presented from group home w/ seizures and vomiting, found to be hypotensive and hypothermic, admitted with septic shock requiring pressors likely 2/2 enteritis seen on CT A/P.

## 2019-04-24 NOTE — PROGRESS NOTE ADULT - ATTENDING COMMENTS
Septic shock likely d/t enteritis, s/p pressors in ICU, c/w empiric Cipro/Flagyl, monitor BP  Seizures, EEG w/ no epileptiform discharges, c/w Keppra and Depacon as per neuro   Cerebral palsy, c/w Cogentin and Zyprexa for agitation   Dysphagia, will need swallowing evaluation prior to restarting diet

## 2019-04-24 NOTE — PROGRESS NOTE ADULT - PROBLEM SELECTOR PLAN 1
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  Neurology on board and pt was loaded w/ 1g keppra, 20mg/kg depakote on arrival.  EEG showed no seizure activity however pt not very cooperative w/ test.  - c/w keppra 1000 TID, Depakote 750 TID  - ativan 2mg IV PRN for seizure activity > 3 min

## 2019-04-24 NOTE — PROGRESS NOTE ADULT - PROBLEM SELECTOR PLAN 2
CT abd/pelvis showed evidence of enteritis in the jejenum  - s/p broad spectrum abx started when patient septic  - pt not having diarrhea so stool studies not sent  - c/ow cipro/flagyl x 7 days (day 2/7)

## 2019-04-24 NOTE — PROGRESS NOTE ADULT - SUBJECTIVE AND OBJECTIVE BOX
Ilda Pizano MD  PGY1 | Dept of Internal Medicine  [facility] [phone]        Patient is a 53y old  Female who presents with a chief complaint of seizures (2019 05:51)      SUBJECTIVE / OVERNIGHT EVENTS:        Review of Systems:  General: fatigue [ ], fever/chills [ ], weakness [ ], weight loss [ ]  HEENT: headache [ ], hearing changes [ ], vision changes [ ], hoarseness [ ], sore throat [ ], nasal discharge [ ]  Cardiovascular: chest pain [ ], palpiations [ ], dyspnea on exertion [ ], orthopnea [ ], PND [ ]  Respiratory: SOB [ ], cough [ ], wheeze [ ], exercise intolerance [ ]  Gastrointestinal: abdominal pain [ ], unintentional weight loss [ ], heartburn [ ], nausea [ ], vomiting [ ], hematochezia [ ], melena [ ]  Genitourinary: dysuria [ ], frequency [ ], urgency [ ], hematuria [ ], incontinence [ ]  Skin: lesions [ ], rashes [ ]  Neuro: headache [ ], changes in senses [ ], speech problems [ ], balance problems [ ], numbness/tingling [ ], weakness [ ]          Vital Signs Last 24 Hrs  T(C): 36.7 (2019 07:08), Max: 36.7 (2019 07:08)  T(F): 98 (2019 07:08), Max: 98 (2019 07:08)  HR: 65 (2019 07:08) (51 - 92)  BP: 117/68 (2019 07:08) (91/54 - 126/61)  BP(mean): 80 (2019 17:00) (66 - 101)  RR: 18 (2019 07:08) (12 - 28)  SpO2: 100% (2019 07:08) (93% - 100%)    I&O's Summary    2019 07:01  -  2019 07:00  --------------------------------------------------------  IN: 2897.8 mL / OUT: 0 mL / NET: 2897.8 mL        PHYSICAL EXAM:  GENERAL: NAD, well-developed  HEAD:  Atraumatic, Normocephalic  EYES: EOMI, PERRLA, conjunctiva and sclera clear  NECK: Supple, No JVD  CHEST/LUNG: Clear to auscultation bilaterally; No wheeze  HEART: Regular rate and rhythm; No murmurs, rubs, or gallops  ABDOMEN: Soft, Nontender, Nondistended; Bowel sounds present  EXTREMITIES:  2+ Peripheral Pulses, No clubbing, cyanosis, or edema  PSYCH: AAOx3  NEUROLOGY: non-focal  SKIN: No rashes or lesions      MEDICATIONS  (STANDING):  benztropine 1 milliGRAM(s) Oral at bedtime  ciprofloxacin   IVPB 400 milliGRAM(s) IV Intermittent every 12 hours  enoxaparin Injectable 40 milliGRAM(s) SubCutaneous daily  lactated ringers. 1000 milliLiter(s) (75 mL/Hr) IV Continuous <Continuous>  levETIRAcetam  IVPB 1000 milliGRAM(s) IV Intermittent <User Schedule>  metroNIDAZOLE  IVPB 500 milliGRAM(s) IV Intermittent every 8 hours  OLANZapine Disintegrating Tablet 5 milliGRAM(s) Oral at bedtime  valproate sodium IVPB 750 milliGRAM(s) IV Intermittent every 8 hours    MEDICATIONS  (PRN):      LABS:  CAPILLARY BLOOD GLUCOSE                              11.6   5.22  )-----------( 213      ( 2019 09:18 )             38.5     Auto Eosinophil # 0.16  / Auto Eosinophil % 3.1   / Auto Neutrophil # 1.85  / Auto Neutrophil % 35.4  / BANDS % x                            13.1   5.09  )-----------( 220      ( 2019 18:30 )             42.3     Auto Eosinophil # 0.04  / Auto Eosinophil % 0.8   / Auto Neutrophil # 3.07  / Auto Neutrophil % 60.2  / BANDS % 0        Hgb Trend: 11.6<--, 13.1<--      141  |  107  |  15  ----------------------------<  129<H>  3.9   |  20<L>  |  0.51      141  |  101  |  19  ----------------------------<  100<H>  4.0   |  26  |  0.57    Ca    7.7<L>      2019 09:18  Mg     1.6       Phos  2.6       TPro  5.7<L>  /  Alb  2.7<L>  /  TBili  0.2  /  DBili  x   /  AST  43<H>  /  ALT  28  /  AlkPhos  53    TPro  6.9  /  Alb  3.2<L>  /  TBili  0.3  /  DBili  x   /  AST  36<H>  /  ALT  28  /  AlkPhos  63      Creatinine Trend: 0.51<--, 0.57<--, 0.54<--, 0.52<--, 0.50<--, 0.57<--        Urinalysis Basic - ( 2019 20:05 )    Color: YELLOW / Appearance: CLEAR / S.038 / pH: 6.0  Gluc: NEGATIVE / Ketone: 20  / Bili: NEGATIVE / Urobili: 2   Blood: NEGATIVE / Protein: 30 / Nitrite: NEGATIVE   Leuk Esterase: NEGATIVE / RBC: 0-2 / WBC 3-5   Sq Epi: x / Non Sq Epi: FEW / Bacteria: FEW            ABG: ( 2019 07:00 ) pH: 7.32  /  pCO2: 54    /  pO2: 73    / HCO3: 25    / Base Excess: 1.4   /  SaO2: 93.2              VBG:     MICROBIOLOGY:     Culture - Urine (collected 2019 21:19)  Source: URINE MIDSTREAM  Final Report (2019 09:03):    NO GROWTH AT 24 HOURS        RADIOLOGY & ADDITIONAL TESTS:      Consultant(s) Notes Reviewed: Ilda Pizano MD  PGY1 | Dept of Internal Medicine  [facility] [phone]        Patient is a 53y old  Female who presents with a chief complaint of seizures (2019 05:51)      SUBJECTIVE / OVERNIGHT EVENTS:  No seizures overnight. Night nurse did not note any diarrhea.  Unable to obtain ROS 2/2 mental status (AAO x 0)         Review of Systems:  General: fatigue [ ], fever/chills [ ], weakness [ ], weight loss [ ]  HEENT: headache [ ], hearing changes [ ], vision changes [ ], hoarseness [ ], sore throat [ ], nasal discharge [ ]  Cardiovascular: chest pain [ ], palpiations [ ], dyspnea on exertion [ ], orthopnea [ ], PND [ ]  Respiratory: SOB [ ], cough [ ], wheeze [ ], exercise intolerance [ ]  Gastrointestinal: abdominal pain [ ], unintentional weight loss [ ], heartburn [ ], nausea [ ], vomiting [ ], hematochezia [ ], melena [ ]  Genitourinary: dysuria [ ], frequency [ ], urgency [ ], hematuria [ ], incontinence [ ]  Skin: lesions [ ], rashes [ ]  Neuro: headache [ ], changes in senses [ ], speech problems [ ], balance problems [ ], numbness/tingling [ ], weakness [ ]          Vital Signs Last 24 Hrs  T(C): 36.7 (2019 07:08), Max: 36.7 (2019 07:08)  T(F): 98 (2019 07:08), Max: 98 (2019 07:08)  HR: 65 (2019 07:08) (51 - 92)  BP: 117/68 (2019 07:08) (91/54 - 126/61)  BP(mean): 80 (2019 17:00) (66 - 101)  RR: 18 (2019 07:08) (12 - 28)  SpO2: 100% (2019 07:08) (93% - 100%)    I&O's Summary    2019 07:01  -  2019 07:00  --------------------------------------------------------  IN: 2897.8 mL / OUT: 0 mL / NET: 2897.8 mL          	PHYSICAL EXAM:  	GENERAL: Awake and alert w/ contracture of body   	HEAD: NCAT   	EYES: PERRL, EOMI  	NECK: supple  	CHEST/LUNG: CTAB, no wheeze or crackles   	HEART: S1 S2, rr, no murmurs  	ABDOMEN: soft, + BS, pt flinched w/ palpation of LLQ   	EXTREMITIES: LE contracted, edema in b/l feet, palpable DP pulses    	NEUROLOGY: AAOx0, awake and alert, moves UE  SKIN: no ulcers or rashes      MEDICATIONS  (STANDING):  benztropine 1 milliGRAM(s) Oral at bedtime  ciprofloxacin   IVPB 400 milliGRAM(s) IV Intermittent every 12 hours  enoxaparin Injectable 40 milliGRAM(s) SubCutaneous daily  lactated ringers. 1000 milliLiter(s) (75 mL/Hr) IV Continuous <Continuous>  levETIRAcetam  IVPB 1000 milliGRAM(s) IV Intermittent <User Schedule>  metroNIDAZOLE  IVPB 500 milliGRAM(s) IV Intermittent every 8 hours  OLANZapine Disintegrating Tablet 5 milliGRAM(s) Oral at bedtime  valproate sodium IVPB 750 milliGRAM(s) IV Intermittent every 8 hours    MEDICATIONS  (PRN):      LABS:  CAPILLARY BLOOD GLUCOSE                              11.6   5.22  )-----------( 213      ( 2019 09:18 )             38.5     Auto Eosinophil # 0.16  / Auto Eosinophil % 3.1   / Auto Neutrophil # 1.85  / Auto Neutrophil % 35.4  / BANDS % x                            13.1   5.09  )-----------( 220      ( 2019 18:30 )             42.3     Auto Eosinophil # 0.04  / Auto Eosinophil % 0.8   / Auto Neutrophil # 3.07  / Auto Neutrophil % 60.2  / BANDS % 0        Hgb Trend: 11.6<--, 13.1<--  -    141  |  107  |  15  ----------------------------<  129<H>  3.9   |  20<L>  |  0.51      141  |  101  |  19  ----------------------------<  100<H>  4.0   |  26  |  0.57    Ca    7.7<L>      2019 09:18  Mg     1.6       Phos  2.6       TPro  5.7<L>  /  Alb  2.7<L>  /  TBili  0.2  /  DBili  x   /  AST  43<H>  /  ALT  28  /  AlkPhos  53    TPro  6.9  /  Alb  3.2<L>  /  TBili  0.3  /  DBili  x   /  AST  36<H>  /  ALT  28  /  AlkPhos  63      Creatinine Trend: 0.51<--, 0.57<--, 0.54<--, 0.52<--, 0.50<--, 0.57<--        Urinalysis Basic - ( 2019 20:05 )    Color: YELLOW / Appearance: CLEAR / S.038 / pH: 6.0  Gluc: NEGATIVE / Ketone: 20  / Bili: NEGATIVE / Urobili: 2   Blood: NEGATIVE / Protein: 30 / Nitrite: NEGATIVE   Leuk Esterase: NEGATIVE / RBC: 0-2 / WBC 3-5   Sq Epi: x / Non Sq Epi: FEW / Bacteria: FEW            ABG: ( 2019 07:00 ) pH: 7.32  /  pCO2: 54    /  pO2: 73    / HCO3: 25    / Base Excess: 1.4   /  SaO2: 93.2              VBG:     MICROBIOLOGY:     Culture - Urine (collected 2019 21:19)  Source: URINE MIDSTREAM  Final Report (2019 09:03):    NO GROWTH AT 24 HOURS

## 2019-04-24 NOTE — PROGRESS NOTE ADULT - PROBLEM SELECTOR PLAN 3
urine and blood cx X 2 shows NGTD.  Pt s/p levo for BP support and broad spectrum abx.  Sepsis now resolved.

## 2019-04-25 DIAGNOSIS — R53.2 FUNCTIONAL QUADRIPLEGIA: ICD-10-CM

## 2019-04-25 LAB
ANION GAP SERPL CALC-SCNC: 15 MMO/L — HIGH (ref 7–14)
ANISOCYTOSIS BLD QL: SLIGHT — SIGNIFICANT CHANGE UP
BUN SERPL-MCNC: 6 MG/DL — LOW (ref 7–23)
CALCIUM SERPL-MCNC: 8.8 MG/DL — SIGNIFICANT CHANGE UP (ref 8.4–10.5)
CHLORIDE SERPL-SCNC: 102 MMOL/L — SIGNIFICANT CHANGE UP (ref 98–107)
CO2 SERPL-SCNC: 21 MMOL/L — LOW (ref 22–31)
CREAT SERPL-MCNC: 0.41 MG/DL — LOW (ref 0.5–1.3)
GLUCOSE BLDC GLUCOMTR-MCNC: 119 MG/DL — HIGH (ref 70–99)
GLUCOSE BLDC GLUCOMTR-MCNC: 133 MG/DL — HIGH (ref 70–99)
GLUCOSE BLDC GLUCOMTR-MCNC: 60 MG/DL — LOW (ref 70–99)
GLUCOSE BLDC GLUCOMTR-MCNC: 65 MG/DL — LOW (ref 70–99)
GLUCOSE BLDC GLUCOMTR-MCNC: 94 MG/DL — SIGNIFICANT CHANGE UP (ref 70–99)
GLUCOSE SERPL-MCNC: 51 MG/DL — LOW (ref 70–99)
HCT VFR BLD CALC: 46.9 % — HIGH (ref 34.5–45)
HGB BLD-MCNC: 14.2 G/DL — SIGNIFICANT CHANGE UP (ref 11.5–15.5)
LG PLATELETS BLD QL AUTO: SLIGHT — SIGNIFICANT CHANGE UP
MACROCYTES BLD QL: SLIGHT — SIGNIFICANT CHANGE UP
MAGNESIUM SERPL-MCNC: 1.9 MG/DL — SIGNIFICANT CHANGE UP (ref 1.6–2.6)
MCHC RBC-ENTMCNC: 30.3 % — LOW (ref 32–36)
MCHC RBC-ENTMCNC: 32.3 PG — SIGNIFICANT CHANGE UP (ref 27–34)
MCV RBC AUTO: 106.6 FL — HIGH (ref 80–100)
NRBC # FLD: 0 K/UL — SIGNIFICANT CHANGE UP (ref 0–0)
PHOSPHATE SERPL-MCNC: 4.2 MG/DL — SIGNIFICANT CHANGE UP (ref 2.5–4.5)
PLATELET # BLD AUTO: 120 K/UL — LOW (ref 150–400)
PLATELET COUNT - ESTIMATE: SIGNIFICANT CHANGE UP
PMV BLD: SIGNIFICANT CHANGE UP FL (ref 7–13)
POTASSIUM SERPL-MCNC: 5.4 MMOL/L — HIGH (ref 3.5–5.3)
POTASSIUM SERPL-SCNC: 5.4 MMOL/L — HIGH (ref 3.5–5.3)
RBC # BLD: 4.4 M/UL — SIGNIFICANT CHANGE UP (ref 3.8–5.2)
RBC # FLD: 15.1 % — HIGH (ref 10.3–14.5)
SODIUM SERPL-SCNC: 138 MMOL/L — SIGNIFICANT CHANGE UP (ref 135–145)
WBC # BLD: 5.35 K/UL — SIGNIFICANT CHANGE UP (ref 3.8–10.5)
WBC # FLD AUTO: 5.35 K/UL — SIGNIFICANT CHANGE UP (ref 3.8–10.5)

## 2019-04-25 PROCEDURE — 92610 EVALUATE SWALLOWING FUNCTION: CPT

## 2019-04-25 PROCEDURE — 99232 SBSQ HOSP IP/OBS MODERATE 35: CPT | Mod: GC

## 2019-04-25 RX ORDER — VALPROIC ACID (AS SODIUM SALT) 250 MG/5ML
750 SOLUTION, ORAL ORAL EVERY 8 HOURS
Refills: 0 | Status: DISCONTINUED | OUTPATIENT
Start: 2019-04-25 | End: 2019-04-25

## 2019-04-25 RX ORDER — VALPROIC ACID (AS SODIUM SALT) 250 MG/5ML
750 SOLUTION, ORAL ORAL EVERY 8 HOURS
Refills: 0 | Status: DISCONTINUED | OUTPATIENT
Start: 2019-04-25 | End: 2019-04-26

## 2019-04-25 RX ORDER — LEVETIRACETAM 250 MG/1
1000 TABLET, FILM COATED ORAL ONCE
Refills: 0 | Status: COMPLETED | OUTPATIENT
Start: 2019-04-25 | End: 2019-04-25

## 2019-04-25 RX ORDER — METRONIDAZOLE 500 MG
500 TABLET ORAL ONCE
Refills: 0 | Status: COMPLETED | OUTPATIENT
Start: 2019-04-25 | End: 2019-04-25

## 2019-04-25 RX ORDER — BENZTROPINE MESYLATE 1 MG
1 TABLET ORAL ONCE
Refills: 0 | Status: COMPLETED | OUTPATIENT
Start: 2019-04-25 | End: 2019-04-25

## 2019-04-25 RX ORDER — OLANZAPINE 15 MG/1
5 TABLET, FILM COATED ORAL ONCE
Refills: 0 | Status: COMPLETED | OUTPATIENT
Start: 2019-04-25 | End: 2019-04-25

## 2019-04-25 RX ORDER — CIPROFLOXACIN LACTATE 400MG/40ML
400 VIAL (ML) INTRAVENOUS ONCE
Refills: 0 | Status: COMPLETED | OUTPATIENT
Start: 2019-04-25 | End: 2019-04-25

## 2019-04-25 RX ADMIN — Medication 500 MILLIGRAM(S): at 05:38

## 2019-04-25 RX ADMIN — LEVETIRACETAM 1000 MILLIGRAM(S): 250 TABLET, FILM COATED ORAL at 02:16

## 2019-04-25 RX ADMIN — ENOXAPARIN SODIUM 40 MILLIGRAM(S): 100 INJECTION SUBCUTANEOUS at 14:00

## 2019-04-25 RX ADMIN — DEXTROSE MONOHYDRATE, SODIUM CHLORIDE, AND POTASSIUM CHLORIDE 75 MILLILITER(S): 50; .745; 4.5 INJECTION, SOLUTION INTRAVENOUS at 10:21

## 2019-04-25 RX ADMIN — LEVETIRACETAM 1000 MILLIGRAM(S): 250 TABLET, FILM COATED ORAL at 18:30

## 2019-04-25 RX ADMIN — OLANZAPINE 5 MILLIGRAM(S): 15 TABLET, FILM COATED ORAL at 23:51

## 2019-04-25 RX ADMIN — DEXTROSE MONOHYDRATE, SODIUM CHLORIDE, AND POTASSIUM CHLORIDE 75 MILLILITER(S): 50; .745; 4.5 INJECTION, SOLUTION INTRAVENOUS at 23:51

## 2019-04-25 RX ADMIN — Medication 1 MILLIGRAM(S): at 23:51

## 2019-04-25 RX ADMIN — LEVETIRACETAM 1000 MILLIGRAM(S): 250 TABLET, FILM COATED ORAL at 09:36

## 2019-04-25 RX ADMIN — Medication 500 MILLIGRAM(S): at 09:36

## 2019-04-25 RX ADMIN — Medication 750 MILLIGRAM(S): at 05:39

## 2019-04-25 RX ADMIN — Medication 28.75 MILLIGRAM(S): at 19:09

## 2019-04-25 RX ADMIN — Medication 500 MILLIGRAM(S): at 13:46

## 2019-04-25 NOTE — PROGRESS NOTE ADULT - SUBJECTIVE AND OBJECTIVE BOX
Ilda Pizano MD  PGY1 | Dept of Internal Medicine  pgr [phone]        Patient is a 53y old  Female who presents with a chief complaint of seizures (24 Apr 2019 09:38)      SUBJECTIVE / OVERNIGHT EVENTS:  Pt lost IV access yesterday and was switched to oral meds; this morning IV nurse was able to put in line.  No other acute events overnight.         Review of Systems:  General: fatigue [ ], fever/chills [ ], weakness [ ], weight loss [ ]  HEENT: headache [ ], hearing changes [ ], vision changes [ ], hoarseness [ ], sore throat [ ], nasal discharge [ ]  Cardiovascular: chest pain [ ], palpiations [ ], dyspnea on exertion [ ], orthopnea [ ], PND [ ]  Respiratory: SOB [ ], cough [ ], wheeze [ ], exercise intolerance [ ]  Gastrointestinal: abdominal pain [ ], unintentional weight loss [ ], heartburn [ ], nausea [ ], vomiting [ ], hematochezia [ ], melena [ ]  Genitourinary: dysuria [ ], frequency [ ], urgency [ ], hematuria [ ], incontinence [ ]  Skin: lesions [ ], rashes [ ]  Neuro: headache [ ], changes in senses [ ], speech problems [ ], balance problems [ ], numbness/tingling [ ], weakness [ ]          Vital Signs Last 24 Hrs  T(C): 36.7 (25 Apr 2019 05:40), Max: 36.7 (25 Apr 2019 05:40)  T(F): 98 (25 Apr 2019 05:40), Max: 98 (25 Apr 2019 05:40)  HR: 74 (25 Apr 2019 05:40) (62 - 74)  BP: 106/65 (25 Apr 2019 05:40) (106/65 - 135/81)  BP(mean): --  RR: 18 (25 Apr 2019 05:40) (18 - 74)  SpO2: 97% (25 Apr 2019 05:40) (97% - 97%)    I&O's Summary    24 Apr 2019 07:01  -  25 Apr 2019 07:00  --------------------------------------------------------  IN: 100 mL / OUT: 0 mL / NET: 100 mL        PHYSICAL EXAM:  GENERAL: NAD, well-developed  HEAD:  Atraumatic, Normocephalic  EYES: EOMI, PERRLA, conjunctiva and sclera clear  NECK: Supple, No JVD  CHEST/LUNG: Clear to auscultation bilaterally; No wheeze  HEART: Regular rate and rhythm; No murmurs, rubs, or gallops  ABDOMEN: Soft, Nontender, Nondistended; Bowel sounds present  EXTREMITIES:  2+ Peripheral Pulses, No clubbing, cyanosis, or edema  PSYCH: AAOx3  NEUROLOGY: non-focal  SKIN: No rashes or lesions      MEDICATIONS  (STANDING):  benztropine 1 milliGRAM(s) Oral at bedtime  ciprofloxacin     Tablet 500 milliGRAM(s) Oral every 12 hours  dextrose 5% + sodium chloride 0.45% with potassium chloride 20 mEq/L 1000 milliLiter(s) (75 mL/Hr) IV Continuous <Continuous>  enoxaparin Injectable 40 milliGRAM(s) SubCutaneous daily  levETIRAcetam 1000 milliGRAM(s) Oral <User Schedule>  metroNIDAZOLE    Tablet 500 milliGRAM(s) Oral every 8 hours  OLANZapine Disintegrating Tablet 5 milliGRAM(s) Oral at bedtime  valproic  acid Syrup 750 milliGRAM(s) Oral every 8 hours    MEDICATIONS  (PRN):      LABS:  CAPILLARY BLOOD GLUCOSE      POCT Blood Glucose.: 60 mg/dL (25 Apr 2019 10:06)  POCT Blood Glucose.: 65 mg/dL (25 Apr 2019 10:04)                          14.2   5.35  )-----------( 120      ( 25 Apr 2019 06:30 )             46.9     Auto Eosinophil # x     / Auto Eosinophil % x     / Auto Neutrophil # x     / Auto Neutrophil % x     / BANDS % x        Hgb Trend: 14.2<--, 11.6<--, 13.1<--  04-25    138  |  102  |  6<L>  ----------------------------<  51<L>  5.4<H>   |  21<L>  |  0.41<L>    Ca    8.8      25 Apr 2019 06:30  Mg     1.9     04-25  Phos  4.2     04-25    Creatinine Trend: 0.41<--, 0.51<--, 0.57<--, 0.54<--, 0.52<--, 0.50<--                ABG: ( 23 Apr 2019 07:00 ) pH: 7.32  /  pCO2: 54    /  pO2: 73    / HCO3: 25    / Base Excess: 1.4   /  SaO2: 93.2              VBG:     MICROBIOLOGY:     Culture - Urine (collected 22 Apr 2019 21:19)  Source: URINE MIDSTREAM  Final Report (24 Apr 2019 09:03):    NO GROWTH AT 24 HOURS

## 2019-04-25 NOTE — SWALLOW BEDSIDE ASSESSMENT ADULT - ADDITIONAL RECOMMENDATIONS
Monitor PO tolerance/intake. 24 yo  at 37w6d GA by first trimester sono presents for irregular contractions since about 1 am, moderate intensity. She denies LOF or VB. She feels regular fetal movement. She denies problems this pregnancy. This is a short interval pregnancy (she conceived 4 months postpartum).

## 2019-04-25 NOTE — SWALLOW BEDSIDE ASSESSMENT ADULT - SWALLOW EVAL: DIAGNOSIS
Attempted PO trials of Puree and Honey Thick Liquids via teaspoon presentation; Patient retrieve/strip the bolus from the teaspoon then spits it out of the oral cavity.  Given patient's behavior, the swallowing mechanism could not be adequately assessed.

## 2019-04-25 NOTE — PROGRESS NOTE ADULT - PROBLEM SELECTOR PLAN 2
CT abd/pelvis showed evidence of enteritis in the jejenum  - s/p broad spectrum abx started when patient septic  - pt not having diarrhea so stool studies not sent  - c/ow cipro/flagyl x 7 days (day 3/7)

## 2019-04-25 NOTE — SWALLOW BEDSIDE ASSESSMENT ADULT - COMMENTS
53F with h/o CP (non-verbal, non-ambulatory, wheelchair-bound at baseline) presented from group home w/ seizures and vomiting, found to be hypotensive and hypothermic, admitted with septic shock requiring pressors likely 2/2 enteritis seen on CT A/P.    Patient seen at bedside, alert/awake state. Shouting at times, but calm state.      Of Note: Patient is known to this service from previous admissions and has had Clinical Swallow Evaluations and Cinesophagrams. The Last Cinesophagram was completed back on October 29, 2018 with recommendations for Puree and Honey Thick Liquids (See Report).

## 2019-04-25 NOTE — PROGRESS NOTE ADULT - ATTENDING COMMENTS
Septic shock likely d/t enteritis, s/p pressors in ICU, c/w empiric Cipro/Flagyl, monitor BP  Seizures, EEG w/ no epileptiform discharges, c/w Keppra and Depacon as per neuro   Cerebral palsy, c/w Cogentin and Zyprexa for agitation   Dysphagia, restarted dysphagia 1 diet with honey thickened liquids, family doesn't want PEG Septic shock likely d/t enteritis, s/p pressors in ICU, c/w empiric Cipro/Flagyl, monitor BP  Seizures, EEG w/ no epileptiform discharges, c/w Keppra and Depacon as per neuro   Cerebral palsy, c/w Cogentin and Zyprexa for agitation   Dysphagia, restarted dysphagia 1 diet with honey thickened liquids, family doesn't want PEG  Hypoglycemia, restarted PO diet, may c/w D51/2NS for now

## 2019-04-25 NOTE — PROGRESS NOTE ADULT - ASSESSMENT
53F with h/o CP (non-verbal, non-ambulatory, wheelchair-bound at baseline) presented from group home w/ seizures and vomiting, found to be hypotensive and hypothermic, admitted with septic shock requiring pressors likely 2/2 enteritis seen on CT A/P.

## 2019-04-25 NOTE — SWALLOW BEDSIDE ASSESSMENT ADULT - SWALLOW EVAL: RECOMMENDED DIET
Defer to MD for Nutritional Intake. Of Note: If PO is pursued patient is with baseline Puree and Honey Thick Liquids as per recommendation from last Cinesophagram October 2018 (See Report). Consider Puree and Honey Thick Liquids as per recommendation from last Cinesophagram October 2018 (See Report).

## 2019-04-25 NOTE — PROGRESS NOTE ADULT - PROBLEM SELECTOR PLAN 4
DVT ppx: Lovenox 40 SQ   Diet: NPO for now; on maint. fluids  Dispo: likely d/c today after speech and swallow Return to group home once medically stable

## 2019-04-25 NOTE — PROGRESS NOTE ADULT - PROBLEM SELECTOR PLAN 5
DVT ppx: Lovenox 40 SQ   Diet: NPO for now; on maint. fluids  Dispo: likely d/c today after speech and swallow

## 2019-04-26 ENCOUNTER — TRANSCRIPTION ENCOUNTER (OUTPATIENT)
Age: 54
End: 2019-04-26

## 2019-04-26 LAB
ANION GAP SERPL CALC-SCNC: 12 MMO/L — SIGNIFICANT CHANGE UP (ref 7–14)
BUN SERPL-MCNC: 4 MG/DL — LOW (ref 7–23)
CALCIUM SERPL-MCNC: 8.8 MG/DL — SIGNIFICANT CHANGE UP (ref 8.4–10.5)
CHLORIDE SERPL-SCNC: 105 MMOL/L — SIGNIFICANT CHANGE UP (ref 98–107)
CO2 SERPL-SCNC: 28 MMOL/L — SIGNIFICANT CHANGE UP (ref 22–31)
CREAT SERPL-MCNC: 0.48 MG/DL — LOW (ref 0.5–1.3)
GLUCOSE BLDC GLUCOMTR-MCNC: 119 MG/DL — HIGH (ref 70–99)
GLUCOSE BLDC GLUCOMTR-MCNC: 146 MG/DL — HIGH (ref 70–99)
GLUCOSE BLDC GLUCOMTR-MCNC: 71 MG/DL — SIGNIFICANT CHANGE UP (ref 70–99)
GLUCOSE BLDC GLUCOMTR-MCNC: 88 MG/DL — SIGNIFICANT CHANGE UP (ref 70–99)
GLUCOSE BLDC GLUCOMTR-MCNC: 96 MG/DL — SIGNIFICANT CHANGE UP (ref 70–99)
GLUCOSE BLDC GLUCOMTR-MCNC: 97 MG/DL — SIGNIFICANT CHANGE UP (ref 70–99)
GLUCOSE SERPL-MCNC: 117 MG/DL — HIGH (ref 70–99)
HCT VFR BLD CALC: 39.5 % — SIGNIFICANT CHANGE UP (ref 34.5–45)
HGB BLD-MCNC: 12.3 G/DL — SIGNIFICANT CHANGE UP (ref 11.5–15.5)
MAGNESIUM SERPL-MCNC: 1.7 MG/DL — SIGNIFICANT CHANGE UP (ref 1.6–2.6)
MCHC RBC-ENTMCNC: 31.1 % — LOW (ref 32–36)
MCHC RBC-ENTMCNC: 31.5 PG — SIGNIFICANT CHANGE UP (ref 27–34)
MCV RBC AUTO: 101.3 FL — HIGH (ref 80–100)
NRBC # FLD: 0 K/UL — SIGNIFICANT CHANGE UP (ref 0–0)
PHOSPHATE SERPL-MCNC: 3.9 MG/DL — SIGNIFICANT CHANGE UP (ref 2.5–4.5)
PLATELET # BLD AUTO: 109 K/UL — LOW (ref 150–400)
PMV BLD: 12.1 FL — SIGNIFICANT CHANGE UP (ref 7–13)
POTASSIUM SERPL-MCNC: 3.9 MMOL/L — SIGNIFICANT CHANGE UP (ref 3.5–5.3)
POTASSIUM SERPL-SCNC: 3.9 MMOL/L — SIGNIFICANT CHANGE UP (ref 3.5–5.3)
RBC # BLD: 3.9 M/UL — SIGNIFICANT CHANGE UP (ref 3.8–5.2)
RBC # FLD: 15.7 % — HIGH (ref 10.3–14.5)
SODIUM SERPL-SCNC: 145 MMOL/L — SIGNIFICANT CHANGE UP (ref 135–145)
WBC # BLD: 4.72 K/UL — SIGNIFICANT CHANGE UP (ref 3.8–10.5)
WBC # FLD AUTO: 4.72 K/UL — SIGNIFICANT CHANGE UP (ref 3.8–10.5)

## 2019-04-26 PROCEDURE — 99232 SBSQ HOSP IP/OBS MODERATE 35: CPT | Mod: GC

## 2019-04-26 RX ORDER — VALPROIC ACID (AS SODIUM SALT) 250 MG/5ML
750 SOLUTION, ORAL ORAL EVERY 8 HOURS
Refills: 0 | Status: DISCONTINUED | OUTPATIENT
Start: 2019-04-26 | End: 2019-04-26

## 2019-04-26 RX ORDER — LEVETIRACETAM 250 MG/1
1000 TABLET, FILM COATED ORAL
Refills: 0 | Status: DISCONTINUED | OUTPATIENT
Start: 2019-04-26 | End: 2019-04-26

## 2019-04-26 RX ORDER — CIPROFLOXACIN LACTATE 400MG/40ML
400 VIAL (ML) INTRAVENOUS EVERY 12 HOURS
Refills: 0 | Status: DISCONTINUED | OUTPATIENT
Start: 2019-04-26 | End: 2019-04-26

## 2019-04-26 RX ORDER — CIPROFLOXACIN LACTATE 400MG/40ML
500 VIAL (ML) INTRAVENOUS EVERY 12 HOURS
Refills: 0 | Status: COMPLETED | OUTPATIENT
Start: 2019-04-26 | End: 2019-04-29

## 2019-04-26 RX ORDER — METRONIDAZOLE 500 MG
500 TABLET ORAL EVERY 8 HOURS
Refills: 0 | Status: COMPLETED | OUTPATIENT
Start: 2019-04-26 | End: 2019-04-29

## 2019-04-26 RX ORDER — LEVETIRACETAM 250 MG/1
1000 TABLET, FILM COATED ORAL
Refills: 0 | Status: DISCONTINUED | OUTPATIENT
Start: 2019-04-26 | End: 2019-04-30

## 2019-04-26 RX ORDER — METRONIDAZOLE 500 MG
500 TABLET ORAL EVERY 8 HOURS
Refills: 0 | Status: DISCONTINUED | OUTPATIENT
Start: 2019-04-26 | End: 2019-04-26

## 2019-04-26 RX ORDER — VALPROIC ACID (AS SODIUM SALT) 250 MG/5ML
750 SOLUTION, ORAL ORAL EVERY 8 HOURS
Refills: 0 | Status: DISCONTINUED | OUTPATIENT
Start: 2019-04-26 | End: 2019-04-30

## 2019-04-26 RX ORDER — BENZTROPINE MESYLATE 1 MG
1 TABLET ORAL AT BEDTIME
Refills: 0 | Status: DISCONTINUED | OUTPATIENT
Start: 2019-04-26 | End: 2019-04-30

## 2019-04-26 RX ADMIN — Medication 500 MILLIGRAM(S): at 21:52

## 2019-04-26 RX ADMIN — Medication 28.75 MILLIGRAM(S): at 07:37

## 2019-04-26 RX ADMIN — Medication 100 MILLIGRAM(S): at 05:36

## 2019-04-26 RX ADMIN — LEVETIRACETAM 400 MILLIGRAM(S): 250 TABLET, FILM COATED ORAL at 00:22

## 2019-04-26 RX ADMIN — Medication 750 MILLIGRAM(S): at 15:25

## 2019-04-26 RX ADMIN — Medication 1 MILLIGRAM(S): at 21:52

## 2019-04-26 RX ADMIN — Medication 100 MILLIGRAM(S): at 01:25

## 2019-04-26 RX ADMIN — Medication 750 MILLIGRAM(S): at 21:53

## 2019-04-26 RX ADMIN — LEVETIRACETAM 1000 MILLIGRAM(S): 250 TABLET, FILM COATED ORAL at 18:22

## 2019-04-26 RX ADMIN — Medication 500 MILLIGRAM(S): at 10:35

## 2019-04-26 RX ADMIN — Medication 200 MILLIGRAM(S): at 00:22

## 2019-04-26 RX ADMIN — Medication 200 MILLIGRAM(S): at 06:35

## 2019-04-26 NOTE — DISCHARGE NOTE PROVIDER - NSDCCPCAREPLAN_GEN_ALL_CORE_FT
PRINCIPAL DISCHARGE DIAGNOSIS  Diagnosis: Seizure disorder  Assessment and Plan of Treatment: You were admitted for seizures.  When you arrived you were also found to have enteritis on CT scan which is inflammation of the small intestine and were started on antibiotics (ciprofl PRINCIPAL DISCHARGE DIAGNOSIS  Diagnosis: Seizure disorder  Assessment and Plan of Treatment: You were admitted for seizures.  When you arrived you were also found to have enteritis on CT scan which is inflammation of the small intestine and were started on antibiotics which you completed in the hospital. Please make an appointment with PCP and neurologist within 1 week of leaving the hostpial.

## 2019-04-26 NOTE — DISCHARGE NOTE PROVIDER - CARE PROVIDER_API CALL
Tex Skinner)  Neurology  [address] [facility], [facility] [address]  [place] [address]  Phone: [phone]  Fax: [phone]  Follow Up Time:

## 2019-04-26 NOTE — DISCHARGE NOTE PROVIDER - HOSPITAL COURSE
HPI:    53F with h/o CP (non-verbal, non-ambulatory, wheelchair-bound at baseline) presenting from care home with seizures. Information from charts. Patient reported to have 5 separate seizures described as "facial clenching and generalized convulsions" lasting 30 sec, with episodes of NB emesis afterwards. Last seizure at 3:30PM. No known falls, head trauma, or fever. Of note, patient with hospitalization (3/19-4/9) for seizures 2/2 medication noncompliance.         In the ED:     Vitals: 98.6F (rectal), , 108/85, RR 20, 97% on RA    Notable Labs and Imaging: Negative UA, CTH negative, CT A/P showing mild thickening of proximal jejunal loops with associated mild mesenteric edema likely reflective of an enteritis.    Given: 3L NS, Versed 7mg IM, Keppra 1g, valproate 1440mg IV, CTX 400mg x1, zosyn x1.         Patient became hypotensive to 77/31, hypothermic to 95.3F, and lópez to 40s. Was started on levophed, bear-hugger was ordered.        Hospital Course:     Pt was taken to hospital for status epilepticus and was found to also be in septic shock and was admitted to the MICU for pressor support.  Infectious w/u was positive for enteritis on CT A/P and pt was started on cipro/flagyl.  Neurology was consulted for seizures and pt was loaded w/ 1g keppra and 20mg/kg depakote.  Her home regimen consisted of 1g Keppra TID and 500 depakote 500 TID; in the hospital keppra dosage was unchanged and her depakote dosage was changed to 750 TID.  EEG was performed but did not show any seizure activity.  Pt was titrated off pressors and was transferred to the floor for further management.  On 4/26/19 pt remained stable w/ no further seizures and was deemed stable for discharge back to group home. HPI:    53F with h/o CP (non-verbal, non-ambulatory, wheelchair-bound at baseline) presenting from care home with seizures. Information from charts. Patient reported to have 5 separate seizures described as "facial clenching and generalized convulsions" lasting 30 sec, with episodes of NB emesis afterwards. Last seizure at 3:30PM. No known falls, head trauma, or fever. Of note, patient with hospitalization (3/19-4/9) for seizures 2/2 medication noncompliance.         In the ED:     Vitals: 98.6F (rectal), , 108/85, RR 20, 97% on RA    Notable Labs and Imaging: Negative UA, CTH negative, CT A/P showing mild thickening of proximal jejunal loops with associated mild mesenteric edema likely reflective of an enteritis.    Given: 3L NS, Versed 7mg IM, Keppra 1g, valproate 1440mg IV, CTX 400mg x1, zosyn x1.         Patient became hypotensive to 77/31, hypothermic to 95.3F, and lópez to 40s. Was started on levophed, bear-hugger was ordered.        Hospital Course:     Pt was taken to hospital for status epilepticus and was found to also be in septic shock and was admitted to the MICU for pressor support.  Infectious w/u was positive for enteritis on CT A/P and pt was started on cipro/flagyl.  Neurology was consulted for seizures and pt was loaded w/ 1g keppra and 20mg/kg depakote.  Her home regimen consisted of 1g Keppra TID and 500 depakote 500 TID; in the hospital keppra dosage was unchanged and her depakote dosage was changed to 750 TID.  EEG was performed but did not show any seizure activity.  Pt was titrated off pressors and was transferred to the floor for further management.  Pt completed oral abx for enteritis. There were concerns for difficulty taking medications at home, so pt was observed for a few days and found to not have any issues eating or taking medications.         Pt is hemodynamically stable and ready for discharge. HPI:    53F with h/o CP (non-verbal, non-ambulatory, wheelchair-bound at baseline) presenting from care home with seizures. Information from charts. Patient reported to have 5 separate seizures described as "facial clenching and generalized convulsions" lasting 30 sec, with episodes of NB emesis afterwards. Last seizure at 3:30PM. No known falls, head trauma, or fever. Of note, patient with hospitalization (3/19-4/9) for seizures 2/2 medication noncompliance.         In the ED:     Vitals: 98.6F (rectal), , 108/85, RR 20, 97% on RA    Notable Labs and Imaging: Negative UA, CTH negative, CT A/P showing mild thickening of proximal jejunal loops with associated mild mesenteric edema likely reflective of an enteritis.    Given: 3L NS, Versed 7mg IM, Keppra 1g, valproate 1440mg IV, CTX 400mg x1, zosyn x1.         Patient became hypotensive to 77/31, hypothermic to 95.3F, and lópez to 40s. Was started on levophed, bear-hugger was ordered.        Hospital Course:     Pt was taken to hospital for status epilepticus and was found to also be in septic shock and was admitted to the MICU for pressor support.  Infectious w/u was positive for enteritis on CT A/P and pt was started on cipro/flagyl.  Neurology was consulted for seizures and pt was loaded w/ 1g keppra and 20mg/kg depakote.  Her home regimen consisted of 1g Keppra TID and 500 depakote 500 TID; in the hospital keppra dosage was unchanged and her depakote dosage was changed to 750 TID and ativan 1mg tid was discontinued.  EEG was performed but did not show any seizure activity.  Pt was titrated off pressors and was transferred to the floor for further management.  Pt completed oral abx for enteritis. There were concerns for difficulty taking medications at home, so pt was observed for a few days and found to not have any issues eating or taking medications.         Pt is hemodynamically stable and ready for discharge. HPI:    53F with h/o CP (non-verbal, non-ambulatory, wheelchair-bound at baseline) presenting from care home with seizures. Information from charts. Patient reported to have 5 separate seizures described as "facial clenching and generalized convulsions" lasting 30 sec, with episodes of NB emesis afterwards. Last seizure at 3:30PM. No known falls, head trauma, or fever. Of note, patient with hospitalization (3/19-4/9) for seizures 2/2 medication noncompliance.         In the ED:     Vitals: 98.6F (rectal), , 108/85, RR 20, 97% on RA    Notable Labs and Imaging: Negative UA, CTH negative, CT A/P showing mild thickening of proximal jejunal loops with associated mild mesenteric edema likely reflective of an enteritis.    Given: 3L NS, Versed 7mg IM, Keppra 1g, valproate 1440mg IV, CTX 400mg x1, zosyn x1.         Patient became hypotensive to 77/31, hypothermic to 95.3F, and lópez to 40s. Was started on levophed, bear-hugger was ordered.        Hospital Course:     Pt was taken to hospital for status epilepticus and was found to also be in septic shock and was admitted to the MICU for pressor support.  Infectious w/u was positive for enteritis on CT A/P and pt was started on cipro/flagyl and completed a 7 day course of antibiotics.  Neurology was consulted for seizures and pt was loaded w/ 1g keppra and 20mg/kg depakote.  Her home regimen consisted of 1g Keppra TID and 500 depakote 500 TID; in the hospital keppra dosage was unchanged and her depakote dosage was changed to 750 TID and ativan 1mg tid was discontinued.  EEG was performed but did not show any seizure activity.  Pt was titrated off pressors and was transferred to the floor for further management.  Pt had no seizures on floors.  There were concerns for difficulty taking medications at home, so pt was observed for a few days and found to not have any issues eating or taking medications.         Pt is hemodynamically stable and ready for discharge.         Home medication changes:    Depakote increased from 500 to 750 (take 1 250mg pill and 1 500mg pill for total of 750 three times a day)    discontinue 1mg ativan TID

## 2019-04-26 NOTE — PROGRESS NOTE ADULT - PROBLEM SELECTOR PLAN 2
CT abd/pelvis showed evidence of enteritis in the jejenum  - s/p broad spectrum abx started when patient septic  - pt not having diarrhea so stool studies not sent  - c/ow cipro/flagyl x 7 days (day 4/7)

## 2019-04-26 NOTE — PROGRESS NOTE ADULT - ATTENDING COMMENTS
Septic shock likely d/t enteritis, s/p pressors in ICU, c/w empiric Cipro/Flagyl, monitor BP  Seizures, EEG w/ no epileptiform discharges, c/w Keppra and Depacon as per neuro   Cerebral palsy, c/w Cogentin and Zyprexa for agitation   Dysphagia, on dysphagia 1 diet with honey thickened liquids but refusing PO at this time, family doesn't want PEG

## 2019-04-26 NOTE — PROGRESS NOTE ADULT - SUBJECTIVE AND OBJECTIVE BOX
Ilda Pizano MD  PGY1 | Dept of Internal Medicine  [facility] [phone]        Patient is a 53y old  Female who presents with a chief complaint of seizures (26 Apr 2019 12:56)      SUBJECTIVE / OVERNIGHT EVENTS:  Pt agitated overnight and was given Zyprexa.  Refused oral meds so medications were changed to IV.          Review of Systems: unable to be obtained 2/2 mental status   General: fatigue [ ], fever/chills [ ], weakness [ ], weight loss [ ]  HEENT: headache [ ], hearing changes [ ], vision changes [ ], hoarseness [ ], sore throat [ ], nasal discharge [ ]  Cardiovascular: chest pain [ ], palpitations [ ], dyspnea on exertion [ ], orthopnea [ ], PND [ ]  Respiratory: SOB [ ], cough [ ], wheeze [ ], exercise intolerance [ ]  Gastrointestinal: abdominal pain [ ], unintentional weight loss [ ], heartburn [ ], nausea [ ], vomiting [ ], hematochezia [ ], melena [ ]  Genitourinary: dysuria [ ], frequency [ ], urgency [ ], hematuria [ ], incontinence [ ]  Skin: lesions [ ], rashes [ ]  Neuro: headache [ ], changes in senses [ ], speech problems [ ], balance problems [ ], numbness/tingling [ ], weakness [ ]          Vital Signs Last 24 Hrs  T(C): 36.7 (26 Apr 2019 05:37), Max: 36.7 (26 Apr 2019 05:37)  T(F): 98 (26 Apr 2019 05:37), Max: 98 (26 Apr 2019 05:37)  HR: 80 (26 Apr 2019 05:37) (76 - 85)  BP: 129/97 (26 Apr 2019 05:37) (116/96 - 154/87)  BP(mean): --  RR: 18 (26 Apr 2019 05:37) (18 - 19)  SpO2: 96% (26 Apr 2019 05:37) (96% - 97%)    I&O's Summary    25 Apr 2019 07:01  -  26 Apr 2019 07:00  --------------------------------------------------------  IN: 500 mL / OUT: 0 mL / NET: 500 mL          	PHYSICAL EXAM:  	GENERAL: Awake and alert w/ contracture of body   	HEAD: NCAT   	EYES: PERRL, EOMI  	NECK: supple  	CHEST/LUNG: CTAB, no wheeze or crackles   	HEART: S1 S2, rr, no murmurs  	ABDOMEN: soft, + BS, pt flinched w/ palpation of LLQ   	EXTREMITIES: LE contracted, edema in b/l feet, palpable DP pulses    	NEUROLOGY: AAOx0, awake and alert, moves UE  SKIN: no ulcers or rashes      MEDICATIONS  (STANDING):  benztropine 1 milliGRAM(s) Oral at bedtime  ciprofloxacin     Tablet 500 milliGRAM(s) Oral every 12 hours  metroNIDAZOLE    Tablet 500 milliGRAM(s) Oral every 8 hours  valproic acid 750 milliGRAM(s) Oral every 8 hours    MEDICATIONS  (PRN):  levETIRAcetam  IVPB 1000 milliGRAM(s) IV Intermittent <User Schedule> PRN only if pt refuses oral medications      LABS:  CAPILLARY BLOOD GLUCOSE      POCT Blood Glucose.: 97 mg/dL (26 Apr 2019 12:03)  POCT Blood Glucose.: 71 mg/dL (26 Apr 2019 09:08)  POCT Blood Glucose.: 96 mg/dL (26 Apr 2019 07:59)  POCT Blood Glucose.: 146 mg/dL (26 Apr 2019 01:37)  POCT Blood Glucose.: 119 mg/dL (25 Apr 2019 22:02)  POCT Blood Glucose.: 133 mg/dL (25 Apr 2019 17:53)                          12.3   4.72  )-----------( 109      ( 26 Apr 2019 05:09 )             39.5     Auto Eosinophil # x     / Auto Eosinophil % x     / Auto Neutrophil # x     / Auto Neutrophil % x     / BANDS % x                            14.2   5.35  )-----------( 120      ( 25 Apr 2019 06:30 )             46.9     Auto Eosinophil # x     / Auto Eosinophil % x     / Auto Neutrophil # x     / Auto Neutrophil % x     / BANDS % x        Hgb Trend: 12.3<--, 14.2<--, 11.6<--, 13.1<--  04-26    145  |  105  |  4<L>  ----------------------------<  117<H>  3.9   |  28  |  0.48<L>  04-25    138  |  102  |  6<L>  ----------------------------<  51<L>  5.4<H>   |  21<L>  |  0.41<L>    Ca    8.8      26 Apr 2019 05:09  Mg     1.7     04-26  Phos  3.9     04-26    Creatinine Trend: 0.48<--, 0.41<--, 0.51<--, 0.57<--, 0.54<--, 0.52<--                ABG: ( 23 Apr 2019 07:00 ) pH: 7.32  /  pCO2: 54    /  pO2: 73    / HCO3: 25    / Base Excess: 1.4   /  SaO2: 93.2 Ilda Pizano MD  PGY1 | Dept of Internal Medicine  [facility] [phone]        Patient is a 53y old  Female who presents with a chief complaint of seizures (26 Apr 2019 12:56)      SUBJECTIVE / OVERNIGHT EVENTS:  Pt agitated overnight and was given Zyprexa.  Refused oral meds so medications were changed to IV.          Review of Systems: unable to be obtained 2/2 mental status   General: fatigue [ ], fever/chills [ ], weakness [ ], weight loss [ ]  HEENT: headache [ ], hearing changes [ ], vision changes [ ], hoarseness [ ], sore throat [ ], nasal discharge [ ]  Cardiovascular: chest pain [ ], palpitations [ ], dyspnea on exertion [ ], orthopnea [ ], PND [ ]  Respiratory: SOB [ ], cough [ ], wheeze [ ], exercise intolerance [ ]  Gastrointestinal: abdominal pain [ ], unintentional weight loss [ ], heartburn [ ], nausea [ ], vomiting [ ], hematochezia [ ], melena [ ]  Genitourinary: dysuria [ ], frequency [ ], urgency [ ], hematuria [ ], incontinence [ ]  Skin: lesions [ ], rashes [ ]  Neuro: headache [ ], changes in senses [ ], speech problems [ ], balance problems [ ], numbness/tingling [ ], weakness [ ]          Vital Signs Last 24 Hrs  T(C): 36.7 (26 Apr 2019 05:37), Max: 36.7 (26 Apr 2019 05:37)  T(F): 98 (26 Apr 2019 05:37), Max: 98 (26 Apr 2019 05:37)  HR: 80 (26 Apr 2019 05:37) (76 - 85)  BP: 129/97 (26 Apr 2019 05:37) (116/96 - 154/87)  BP(mean): --  RR: 18 (26 Apr 2019 05:37) (18 - 19)  SpO2: 96% (26 Apr 2019 05:37) (96% - 97%)    I&O's Summary    25 Apr 2019 07:01  -  26 Apr 2019 07:00  --------------------------------------------------------  IN: 500 mL / OUT: 0 mL / NET: 500 mL          	PHYSICAL EXAM:  	GENERAL: Awake and alert w/ contracture of body   	HEAD: NCAT   	EYES: PERRL, EOMI  	NECK: supple  	CHEST/LUNG: CTAB, no wheeze or crackles   	HEART: S1 S2, rr, no murmurs  	ABDOMEN: soft, + BS, pt flinched w/ palpation of LLQ   	EXTREMITIES: LE contracted, edema in b/l feet, palpable DP pulses    	NEUROLOGY: AAOx0, awake and alert, moves UE    SKIN: no ulcers or rashes      MEDICATIONS  (STANDING):  benztropine 1 milliGRAM(s) Oral at bedtime  ciprofloxacin     Tablet 500 milliGRAM(s) Oral every 12 hours  metroNIDAZOLE    Tablet 500 milliGRAM(s) Oral every 8 hours  valproic acid 750 milliGRAM(s) Oral every 8 hours    MEDICATIONS  (PRN):  levETIRAcetam  IVPB 1000 milliGRAM(s) IV Intermittent <User Schedule> PRN only if pt refuses oral medications      LABS:  CAPILLARY BLOOD GLUCOSE      POCT Blood Glucose.: 97 mg/dL (26 Apr 2019 12:03)  POCT Blood Glucose.: 71 mg/dL (26 Apr 2019 09:08)  POCT Blood Glucose.: 96 mg/dL (26 Apr 2019 07:59)  POCT Blood Glucose.: 146 mg/dL (26 Apr 2019 01:37)  POCT Blood Glucose.: 119 mg/dL (25 Apr 2019 22:02)  POCT Blood Glucose.: 133 mg/dL (25 Apr 2019 17:53)                          12.3   4.72  )-----------( 109      ( 26 Apr 2019 05:09 )             39.5     Auto Eosinophil # x     / Auto Eosinophil % x     / Auto Neutrophil # x     / Auto Neutrophil % x     / BANDS % x                            14.2   5.35  )-----------( 120      ( 25 Apr 2019 06:30 )             46.9     Auto Eosinophil # x     / Auto Eosinophil % x     / Auto Neutrophil # x     / Auto Neutrophil % x     / BANDS % x        Hgb Trend: 12.3<--, 14.2<--, 11.6<--, 13.1<--  04-26    145  |  105  |  4<L>  ----------------------------<  117<H>  3.9   |  28  |  0.48<L>  04-25    138  |  102  |  6<L>  ----------------------------<  51<L>  5.4<H>   |  21<L>  |  0.41<L>    Ca    8.8      26 Apr 2019 05:09  Mg     1.7     04-26  Phos  3.9     04-26    Creatinine Trend: 0.48<--, 0.41<--, 0.51<--, 0.57<--, 0.54<--, 0.52<--                ABG: ( 23 Apr 2019 07:00 ) pH: 7.32  /  pCO2: 54    /  pO2: 73    / HCO3: 25    / Base Excess: 1.4   /  SaO2: 93.2

## 2019-04-27 LAB
ANION GAP SERPL CALC-SCNC: 13 MMO/L — SIGNIFICANT CHANGE UP (ref 7–14)
BUN SERPL-MCNC: 4 MG/DL — LOW (ref 7–23)
CALCIUM SERPL-MCNC: 9.4 MG/DL — SIGNIFICANT CHANGE UP (ref 8.4–10.5)
CHLORIDE SERPL-SCNC: 103 MMOL/L — SIGNIFICANT CHANGE UP (ref 98–107)
CO2 SERPL-SCNC: 31 MMOL/L — SIGNIFICANT CHANGE UP (ref 22–31)
CREAT SERPL-MCNC: 0.54 MG/DL — SIGNIFICANT CHANGE UP (ref 0.5–1.3)
GLUCOSE BLDC GLUCOMTR-MCNC: 101 MG/DL — HIGH (ref 70–99)
GLUCOSE BLDC GLUCOMTR-MCNC: 104 MG/DL — HIGH (ref 70–99)
GLUCOSE BLDC GLUCOMTR-MCNC: 76 MG/DL — SIGNIFICANT CHANGE UP (ref 70–99)
GLUCOSE BLDC GLUCOMTR-MCNC: 81 MG/DL — SIGNIFICANT CHANGE UP (ref 70–99)
GLUCOSE BLDC GLUCOMTR-MCNC: 82 MG/DL — SIGNIFICANT CHANGE UP (ref 70–99)
GLUCOSE BLDC GLUCOMTR-MCNC: 94 MG/DL — SIGNIFICANT CHANGE UP (ref 70–99)
GLUCOSE SERPL-MCNC: 90 MG/DL — SIGNIFICANT CHANGE UP (ref 70–99)
HCT VFR BLD CALC: 45.2 % — HIGH (ref 34.5–45)
HGB BLD-MCNC: 14.4 G/DL — SIGNIFICANT CHANGE UP (ref 11.5–15.5)
MAGNESIUM SERPL-MCNC: 1.7 MG/DL — SIGNIFICANT CHANGE UP (ref 1.6–2.6)
MCHC RBC-ENTMCNC: 31.6 PG — SIGNIFICANT CHANGE UP (ref 27–34)
MCHC RBC-ENTMCNC: 31.9 % — LOW (ref 32–36)
MCV RBC AUTO: 99.1 FL — SIGNIFICANT CHANGE UP (ref 80–100)
NRBC # FLD: 0 K/UL — SIGNIFICANT CHANGE UP (ref 0–0)
PHOSPHATE SERPL-MCNC: 3.9 MG/DL — SIGNIFICANT CHANGE UP (ref 2.5–4.5)
PLATELET # BLD AUTO: 182 K/UL — SIGNIFICANT CHANGE UP (ref 150–400)
PMV BLD: 11.1 FL — SIGNIFICANT CHANGE UP (ref 7–13)
POTASSIUM SERPL-MCNC: 3.6 MMOL/L — SIGNIFICANT CHANGE UP (ref 3.5–5.3)
POTASSIUM SERPL-SCNC: 3.6 MMOL/L — SIGNIFICANT CHANGE UP (ref 3.5–5.3)
RBC # BLD: 4.56 M/UL — SIGNIFICANT CHANGE UP (ref 3.8–5.2)
RBC # FLD: 16.5 % — HIGH (ref 10.3–14.5)
SODIUM SERPL-SCNC: 147 MMOL/L — HIGH (ref 135–145)
WBC # BLD: 4.63 K/UL — SIGNIFICANT CHANGE UP (ref 3.8–10.5)
WBC # FLD AUTO: 4.63 K/UL — SIGNIFICANT CHANGE UP (ref 3.8–10.5)

## 2019-04-27 PROCEDURE — 99232 SBSQ HOSP IP/OBS MODERATE 35: CPT | Mod: GC

## 2019-04-27 RX ORDER — ENOXAPARIN SODIUM 100 MG/ML
40 INJECTION SUBCUTANEOUS DAILY
Refills: 0 | Status: DISCONTINUED | OUTPATIENT
Start: 2019-04-27 | End: 2019-04-30

## 2019-04-27 RX ADMIN — LEVETIRACETAM 1000 MILLIGRAM(S): 250 TABLET, FILM COATED ORAL at 10:27

## 2019-04-27 RX ADMIN — LEVETIRACETAM 1000 MILLIGRAM(S): 250 TABLET, FILM COATED ORAL at 19:32

## 2019-04-27 RX ADMIN — Medication 1 MILLIGRAM(S): at 22:24

## 2019-04-27 RX ADMIN — ENOXAPARIN SODIUM 40 MILLIGRAM(S): 100 INJECTION SUBCUTANEOUS at 15:22

## 2019-04-27 RX ADMIN — Medication 500 MILLIGRAM(S): at 10:27

## 2019-04-27 RX ADMIN — Medication 750 MILLIGRAM(S): at 22:24

## 2019-04-27 RX ADMIN — Medication 750 MILLIGRAM(S): at 06:20

## 2019-04-27 RX ADMIN — Medication 500 MILLIGRAM(S): at 06:21

## 2019-04-27 RX ADMIN — Medication 500 MILLIGRAM(S): at 22:24

## 2019-04-27 RX ADMIN — LEVETIRACETAM 1000 MILLIGRAM(S): 250 TABLET, FILM COATED ORAL at 02:15

## 2019-04-27 RX ADMIN — Medication 750 MILLIGRAM(S): at 15:20

## 2019-04-27 RX ADMIN — Medication 500 MILLIGRAM(S): at 15:20

## 2019-04-27 NOTE — PROGRESS NOTE ADULT - PROBLEM SELECTOR PLAN 1
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  Neurology on board and pt was loaded w/ 1g keppra, 20mg/kg depakote on arrival.  EEG showed no seizure activity however pt not very cooperative w/ test.  - c/w keppra 1000 TID, Depakote 750 TID  - ativan 2mg IV PRN for seizure activity > 3 min  - will need PEG to ensure medication compliance

## 2019-04-27 NOTE — PROGRESS NOTE ADULT - ATTENDING COMMENTS
Septic shock likely d/t enteritis, s/p pressors in ICU, c/w empiric Cipro/Flagyl, monitor BP  Seizures, EEG w/ no epileptiform discharges, c/w Keppra and Depacon as per neuro   Cerebral palsy, c/w Cogentin and Zyprexa for agitation   Dysphagia, on dysphagia 1 diet with honey thickened liquids, not taking PO medications, will likely need to consider PEG  Hypernatremia likely d/t mild dehydration, start D5W if worsens

## 2019-04-27 NOTE — DIETITIAN INITIAL EVALUATION ADULT. - OTHER INFO
Pt referred for nutrition consult 2/2 assessment for malnutrition in setting for poor oral food intake.  Pt admitted w/ seizures with episodes of emesis.  Met w/pt's mom who provided hx.  Pt lives in a group home and does not live with mother.  As per mom, pt was eating chopped food PTA w/thickened fluids PTA (does not know consistency of fluids), and was self feeding at the home.  Wt changes attributed to frequent hospitalizations, decreased food intake and retention/losses.  Noted wt hx - 10/2018 - 183lbs; 3/8 - 151.8lbs; 3/21 - 157.8lbs.   Pt also not happy w/pureed consistency in hospital.  Noted pt w/~50% food intake at lunch and dinner meals yesterday.  Mom feeding patient now.  Mom states pt was too heavy at one time.  Mom denies food allergies.  Noted attempt at bedside swallow eval on 4/25.  Pt unable to cooperate w/evaluation.

## 2019-04-27 NOTE — PROGRESS NOTE ADULT - SUBJECTIVE AND OBJECTIVE BOX
Ilda Pizano MD  PGY1 | Dept of Internal Medicine  [facility] [phone]        Patient is a 54y old  Female who presents with a chief complaint of seizures (27 Apr 2019 10:50)      SUBJECTIVE / OVERNIGHT EVENTS:  Pt still intermittently compliant w/ oral meds.  Spoke w/ mother who is okay with PEG tube.  Overnight no acute events.          Review of Systems: unable to obtain 2/2 patient being non-verbal   General: fatigue [ ], fever/chills [ ], weakness [ ], weight loss [ ]  HEENT: headache [ ], hearing changes [ ], vision changes [ ], hoarseness [ ], sore throat [ ], nasal discharge [ ]  Cardiovascular: chest pain [ ], palpiations [ ], dyspnea on exertion [ ], orthopnea [ ], PND [ ]  Respiratory: SOB [ ], cough [ ], wheeze [ ], exercise intolerance [ ]  Gastrointestinal: abdominal pain [ ], unintentional weight loss [ ], heartburn [ ], nausea [ ], vomiting [ ], hematochezia [ ], melena [ ]  Genitourinary: dysuria [ ], frequency [ ], urgency [ ], hematuria [ ], incontinence [ ]  Skin: lesions [ ], rashes [ ]  Neuro: headache [ ], changes in senses [ ], speech problems [ ], balance problems [ ], numbness/tingling [ ], weakness [ ]          Vital Signs Last 24 Hrs  T(C): 36.3 (27 Apr 2019 06:16), Max: 36.4 (26 Apr 2019 15:18)  T(F): 97.4 (27 Apr 2019 06:16), Max: 97.6 (26 Apr 2019 15:18)  HR: 70 (27 Apr 2019 06:16) (70 - 81)  BP: 123/91 (27 Apr 2019 06:16) (112/89 - 123/91)  BP(mean): --  RR: 16 (27 Apr 2019 06:16) (16 - 18)  SpO2: 98% (27 Apr 2019 06:16) (96% - 98%)    I&O's Summary    26 Apr 2019 07:01  -  27 Apr 2019 07:00  --------------------------------------------------------  IN: 1067 mL / OUT: 0 mL / NET: 1067 mL        	PHYSICAL EXAM:  	GENERAL: Awake and alert w/ contracture of body   	HEAD: NCAT   	EYES: PERRL, EOMI  	NECK: supple  	CHEST/LUNG: CTAB, no wheeze or crackles   	HEART: S1 S2, rr, no murmurs  	ABDOMEN: soft, + BS, pt flinched w/ palpation of LLQ   	EXTREMITIES: LE contracted, edema in b/l feet, palpable DP pulses    	NEUROLOGY: AAOx0, awake and alert, moves UE    SKIN: no ulcers or rashes      MEDICATIONS  (STANDING):  benztropine 1 milliGRAM(s) Oral at bedtime  ciprofloxacin     Tablet 500 milliGRAM(s) Oral every 12 hours  levETIRAcetam 1000 milliGRAM(s) Oral <User Schedule>  metroNIDAZOLE    Tablet 500 milliGRAM(s) Oral every 8 hours  valproic  acid Syrup 750 milliGRAM(s) Oral every 8 hours    MEDICATIONS  (PRN):      LABS:  CAPILLARY BLOOD GLUCOSE      POCT Blood Glucose.: 82 mg/dL (27 Apr 2019 12:53)  POCT Blood Glucose.: 81 mg/dL (27 Apr 2019 08:31)  POCT Blood Glucose.: 76 mg/dL (27 Apr 2019 06:52)  POCT Blood Glucose.: 94 mg/dL (27 Apr 2019 02:27)  POCT Blood Glucose.: 88 mg/dL (26 Apr 2019 22:05)  POCT Blood Glucose.: 119 mg/dL (26 Apr 2019 18:02)                          14.4   4.63  )-----------( 182      ( 27 Apr 2019 10:47 )             45.2     Auto Eosinophil # x     / Auto Eosinophil % x     / Auto Neutrophil # x     / Auto Neutrophil % x     / BANDS % x                            12.3   4.72  )-----------( 109      ( 26 Apr 2019 05:09 )             39.5     Auto Eosinophil # x     / Auto Eosinophil % x     / Auto Neutrophil # x     / Auto Neutrophil % x     / BANDS % x        Hgb Trend: 14.4<--, 12.3<--, 14.2<--, 11.6<--, 13.1<--  04-27    147<H>  |  103  |  4<L>  ----------------------------<  90  3.6   |  31  |  0.54  04-26    145  |  105  |  4<L>  ----------------------------<  117<H>  3.9   |  28  |  0.48<L>    Ca    9.4      27 Apr 2019 10:47  Mg     1.7     04-27  Phos  3.9     04-27    Creatinine Trend: 0.54<--, 0.48<--, 0.41<--, 0.51<--, 0.57<--, 0.54<--                ABG: ( 23 Apr 2019 07:00 ) pH: 7.32  /  pCO2: 54    /  pO2: 73    / HCO3: 25    / Base Excess: 1.4   /  SaO2: 93.2

## 2019-04-27 NOTE — PROGRESS NOTE ADULT - PROBLEM SELECTOR PLAN 2
CT abd/pelvis showed evidence of enteritis in the jejenum  - s/p broad spectrum abx started when patient septic  - pt not having diarrhea so stool studies not sent  - c/ow cipro/flagyl x 7 days (day 5/7)

## 2019-04-27 NOTE — PROGRESS NOTE ADULT - ASSESSMENT
53F with h/o CP (non-verbal, non-ambulatory, wheelchair-bound at baseline) presenting from care home with seizures. Information from charts. Patient reported to have 5 separate seizures described as "facial clenching and generalized convulsions" lasting 30 sec, with episodes of NB emesis afterwards. Last seizure at 3:30PM. No known falls, head trauma, or fever. Of note, patient with hospitalization (3/19-4/9) for seizures 2/2 medication noncompliance. Staff denies any seizures    continue keppra and valproic acid

## 2019-04-27 NOTE — CHART NOTE - NSCHARTNOTEFT_GEN_A_CORE
MAR Transfer Note-PGY3    MICU to Medicine  Accepting Attending: Dr. Paulo Webb d/w MICU residents. Patient examined in MICU prior to transfer and did not have exam findings precluding transfer. She is noted to be alert but non-verbal during exam.     In Summary,  53F with Cerebral palsy (non-verbal, wheelchair-bound at baseline) presented from group home w/seizures and vomiting, found to be hypotensive and hypothermic, admitted with septic shock requiring pressors likely 2/2 enteritis seen on CT A/P. Patient titrated of pressors and continues to require cipro/flagyl. Patient completed EEG which did not demonstrate epileptiform discharges and is continued on AEDs w/ neurology following.    At time of exam patient appears optimized for triage to medicine service. Please see MICU notation for further detail regarding hospital course.    Aamir Webber MD PGY3
MICU Transfer Note    Transfer from: MICU  Transfer to:  ( x) Medicine    (  ) Telemetry    (  ) RCU    (  ) Palliative    (  ) Stroke Unit    (  ) _______________  Accepting physician:      53F with h/o CP (non-verbal, non-ambulatory, wheelchair-bound at baseline) presenting from care home with seizures. Information from charts. Patient reported to have 5 separate seizures described as "facial clenching and generalized convulsions" lasting 30 sec, with episodes of NB emesis afterwards. Last seizure at 3:30PM. No known falls, head trauma, or fever. Of note, patient with hospitalization (3/19-4/9) for seizures 2/2 medication noncompliance.     In the ED:   Vitals: 98.6F (rectal), , 108/85, RR 20, 97% on RA  Notable Labs and Imaging: Negative UA, CTH negative, CT A/P showing mild thickening of proximal jejunal loops with associated mild mesenteric edema likely reflective of an enteritis.  Given: 3L NS, Versed 7mg IM, Keppra 1g, valproate 1440mg IV, CTX 400mg x1, zosyn x1.     Patient became hypotensive to 77/31, hypothermic to 95.3F, and lópez to 40s. Was started on vancomycin, zosyn, levophed, bear-hugger was ordered. Norepinephrine was titrated off and pt maintained normal blood pressures. Neurology recommended EEG, however pt was unable to fully tolerate. One "Seizure" episode when patient had staring and facial twitching was recorded and per epilepsy attending no seizure activity was seen on EEG tracing.    53F with h/o CP (non-verbal, non-ambulatory, wheelchair-bound at baseline) presented from group home w/seizures and vomiting, found to be hypotensive and hypothermic, admitted with septic shock requiring pressors likely 2/2 enteritis seen on CT A/P vs vasoplegia from versed    #Neuro  Non-verbal at baseline, admitted for multiple seizures, possibly in setting of infection vs missed medications in setting of vomiting    - AAOx0, currently arousable, seen by neurology   - will c/w Keppra 1000 TID, Depakote 750 TID per neuro recs  - c/w zyprexa QHS  - EEG without overt seizure activity  - ativan 2mg IV PRN for seizure activity >3min    #Resp  - satting well on NC, protecting airway     #CVD  septic shock resolved  - s/p 3L NS and norepinephrine    #GI  Episodes of emesis, CT AP with evidence of enteritis   - pt previously on oral diet as tolerated, no PEG per pt mother    #Renal  - no issues     #ID  Hypothermic and hypotensive, a/w septic shock with CT and clinical evidence of enteritis   - c/w cipro, flagyl  - RVP pending, BCx ordered  - UA negative, follow up UCx   - CXR with no acute pathology    #Heme  - no issues     #Skin    #DVT ppx  - lovenox     For follow up  [ ] advance diet as tolerated  [ ] c/w cipro, flagyl  [ ] f/u neuro recs        Vital Signs Last 24 Hrs  T(C): 36.1 (23 Apr 2019 16:00), Max: 36.7 (22 Apr 2019 21:00)  T(F): 97 (23 Apr 2019 16:00), Max: 98 (22 Apr 2019 21:00)  HR: 91 (23 Apr 2019 17:00) (46 - 92)  BP: 103/68 (23 Apr 2019 17:00) (70/50 - 123/77)  BP(mean): 80 (23 Apr 2019 17:00) (47 - 101)  RR: 15 (23 Apr 2019 17:00) (12 - 32)  SpO2: 94% (23 Apr 2019 17:00) (89% - 100%)  I&O's Summary    23 Apr 2019 07:01  -  23 Apr 2019 17:25  --------------------------------------------------------  IN: 522.8 mL / OUT: 0 mL / NET: 522.8 mL          MEDICATIONS  (STANDING):  benztropine 1 milliGRAM(s) Oral at bedtime  chlorhexidine 4% Liquid 1 Application(s) Topical <User Schedule>  ciprofloxacin   IVPB 400 milliGRAM(s) IV Intermittent every 12 hours  enoxaparin Injectable 40 milliGRAM(s) SubCutaneous daily  lactated ringers. 1000 milliLiter(s) (75 mL/Hr) IV Continuous <Continuous>  levETIRAcetam  IVPB 1000 milliGRAM(s) IV Intermittent <User Schedule>  metroNIDAZOLE  IVPB 500 milliGRAM(s) IV Intermittent every 8 hours  OLANZapine Disintegrating Tablet 5 milliGRAM(s) Oral at bedtime  OLANZapine Disintegrating Tablet 5 milliGRAM(s) Oral once  valproate sodium IVPB 750 milliGRAM(s) IV Intermittent every 8 hours    MEDICATIONS  (PRN):        LABS                                            11.6                  Neurophils% (auto):   35.4   (04-23 @ 09:18):    5.22 )-----------(213          Lymphocytes% (auto):  43.1                                          38.5                   Eosinphils% (auto):   3.1      Manual%: Neutrophils x    ; Lymphocytes x    ; Eosinophils x    ; Bands%: x    ; Blasts x                                    141    |  107    |  15                  Calcium: 7.7   / iCa: x      (04-23 @ 09:18)    ----------------------------<  129       Magnesium: 1.6                              3.9     |  20     |  0.51             Phosphorous: 2.6      TPro  5.7    /  Alb  2.7    /  TBili  0.2    /  DBili  x      /  AST  43     /  ALT  28     /  AlkPhos  53     23 Apr 2019 09:18
NUTRITION SERVICES     Upon Nutritional Assessment by the Registered Dietitian your patient was determined to meet criteria/ has evidence of the following diagnosis/diagnoses:  [ ] Mild Protein Calorie Malnutrition   [x] Moderate Protein Calorie Malnutrition   [ ] Severe Protein Calorie Malnutrition   [ ] Unspecified Protein Calorie Malnutrition   [ ] Underweight / BMI <19  [ ] Morbid Obesity / BMI >40    Findings as based on:  •  Comprehensive nutrition assessment and consultation
Spoke w/ pt's nurse at rehab who said that before presentation she was unable to take her seizure meds 2/2 vomiting.  Also reports that ativan 1mg TID is too sedating for pt and that she does not tolerate Vimpat that well either.  Otherwise at baseline pt is cooperative w/ medication administration and has no trouble eating or taking medications.

## 2019-04-27 NOTE — CHART NOTE - NSCHARTNOTESELECT_GEN_ALL_CORE
Event Note
Malnutrition Alert Notification/Nutrition Services
MAR Transfer Note/Transfer Note
Transfer Note

## 2019-04-27 NOTE — DIETITIAN INITIAL EVALUATION ADULT. - SIGNS/SYMPTOMS
as evidenced by 9% wt loss x6m, reported less than 75% food intake PTA, less than 50% intake current

## 2019-04-27 NOTE — DIETITIAN INITIAL EVALUATION ADULT. - NS AS NUTRI INTERV MEALS SNACK
1) Suggest re-attempt at swallow evaluation to optimize oral food intake;  2) continue Dysphagia 1 diet w/honey consistency fluids as tolerated in the interim; 3) provide total assistance at meal times/Texture-modified diet

## 2019-04-27 NOTE — PROGRESS NOTE ADULT - PROBLEM SELECTOR PLAN 3
RESOLVED   urine and blood cx X 2 shows NGTD.  Pt s/p levo for BP support and broad spectrum abx.  Sepsis now resolved.

## 2019-04-27 NOTE — PROGRESS NOTE ADULT - SUBJECTIVE AND OBJECTIVE BOX
Neurology Progress    NAYANA JACQUES54yFemale    HPI:  53F with h/o CP (non-verbal, non-ambulatory, wheelchair-bound at baseline) presenting from care home with seizures. Information from charts. Patient reported to have 5 separate seizures described as "facial clenching and generalized convulsions" lasting 30 sec, with episodes of NB emesis afterwards. Last seizure at 3:30PM. No known falls, head trauma, or fever. Of note, patient with hospitalization (3/19-4/9) for seizures 2/2 medication noncompliance.     In the ED:   Vitals: 98.6F (rectal), , 108/85, RR 20, 97% on RA  Notable Labs and Imaging: Negative UA, CTH negative, CT A/P showing mild thickening of proximal jejunal loops with associated mild mesenteric edema likely reflective of an enteritis.  Given: 3L NS, Versed 7mg IM, Keppra 1g, valproate 1440mg IV, CTX 400mg x1, zosyn x1.     Patient became hypotensive to 77/31, hypothermic to 95.3F, and lópez to 40s. Was started on levophed, bear-hugger was ordered. (23 Apr 2019 05:51)      Past Medical History  Intellectual disability  Constipation  Seizure disorder  Cerebral palsy      Past Surgical History  No significant past surgical history      MEDICATIONS    benztropine 1 milliGRAM(s) Oral at bedtime  ciprofloxacin     Tablet 500 milliGRAM(s) Oral every 12 hours  levETIRAcetam 1000 milliGRAM(s) Oral <User Schedule>  metroNIDAZOLE    Tablet 500 milliGRAM(s) Oral every 8 hours  valproic  acid Syrup 750 milliGRAM(s) Oral every 8 hours         Family history: No history of dementia, strokes, or seizures   FAMILY HISTORY:  No pertinent family history in first degree relatives    SOCIAL HISTORY -- No history of tobacco or alcohol use     Allergies    Topamax (Unknown)    Intolerances            Vital Signs Last 24 Hrs  T(C): 36.3 (27 Apr 2019 06:16), Max: 36.4 (26 Apr 2019 15:18)  T(F): 97.4 (27 Apr 2019 06:16), Max: 97.6 (26 Apr 2019 15:18)  HR: 70 (27 Apr 2019 06:16) (70 - 81)  BP: 123/91 (27 Apr 2019 06:16) (112/89 - 123/91)  BP(mean): --  RR: 16 (27 Apr 2019 06:16) (16 - 18)  SpO2: 98% (27 Apr 2019 06:16) (96% - 98%)        On Neurological Examination:    Mental Status - Patient is alert, awake, oriented X3. .   Follows commands well and able to answer questions appropriately. Mood and affect  normal  Follow simple commands able to repeat  able to name.  Speech - Fluent no Dysarthria  no  Aphasia                              Cranial Nerves - Extraocular muscle intact  WINSTON Facial symmetry Tongue midline, CnV1to V3 intact gross hearing intact      Motor Exam -   Right upper  5/5 throughout  Left upper 5/5 throughtout  Right lower- 5/5 throughout  Left lower 5/5 throughout  Coordination -finger to nose intact  Muscle tone - is normal all over. No asymmetry is seen.      Sensory    Bilateral intact to light touch    GENERAL Exam:     Nontoxic , No Acute Distress   	  HEENT:  normocephalic, atraumatic  		  LUNGS:	Clear bilaterally  No Wheeze      VASCULAR: no carotid brui  	  HEART:	 Normal S1S2   No murmur RRR        	  MUSCULOSKELETAL: Normal Range of Motion  	   SKIN:      Normal   No Ecchymosis               LABS:  CBC Full  -  ( 26 Apr 2019 05:09 )  WBC Count : 4.72 K/uL  RBC Count : 3.90 M/uL  Hemoglobin : 12.3 g/dL  Hematocrit : 39.5 %  Platelet Count - Automated : 109 K/uL  Mean Cell Volume : 101.3 fL  Mean Cell Hemoglobin : 31.5 pg  Mean Cell Hemoglobin Concentration : 31.1 %  Auto Neutrophil # : x  Auto Lymphocyte # : x  Auto Monocyte # : x  Auto Eosinophil # : x  Auto Basophil # : x  Auto Neutrophil % : x  Auto Lymphocyte % : x  Auto Monocyte % : x  Auto Eosinophil % : x  Auto Basophil % : x      04-26    145  |  105  |  4<L>  ----------------------------<  117<H>  3.9   |  28  |  0.48<L>    Ca    8.8      26 Apr 2019 05:09  Phos  3.9     04-26  Mg     1.7     04-26      Hemoglobin A1C:       Vitamin B12         RADIOLOGY    EEG    T:   EEG Report:  · EEG Report		  NAYANA JACQUES [id number]     Study Date: 		04-23-19 extended EEG > 1hr    ROUTINE EEG    Technical Information:			  		  Placement and Labeling of Electrodes:  The EEG was performed utilizing 20 channels referential EEG connections (coronal over temporal over parasagittal montage) using all standard 10-20 electrode placements with EKG.  Recording was at a sampling rate of 256 samples per second per channel.  Time synchronized digital video recording was done simultaneously with EEG recording.  A low light infrared camera was used for low light recording.  Valerio and seizure detection algorithms were utilized.    CSA Technical Component:  Quantitative EEG analysis using a separate Compressed Spectral Array (CSA) software package was conducted in real-time and run at bedside after set up by the technician, digitally displaying the power of electrographic frequencies included in the 1-30Hz band using a graded color map.  This data was reviewed and interpreted independently, and is reported in a separate section below.    --------------------------------------------------------------------------------------------------  History:  CC/ HPI Patient is a 53y old  Female who presents with a chief complaint of seizures (23 Apr 2019 05:51)    MEDICATIONS  (STANDING):  benztropine 1 milliGRAM(s) Oral at bedtime  chlorhexidine 4% Liquid 1 Application(s) Topical <User Schedule>  ciprofloxacin   IVPB 400 milliGRAM(s) IV Intermittent every 12 hours  enoxaparin Injectable 40 milliGRAM(s) SubCutaneous daily  lactated ringers. 1000 milliLiter(s) (75 mL/Hr) IV Continuous <Continuous>  levETIRAcetam  IVPB 1000 milliGRAM(s) IV Intermittent <User Schedule>  metroNIDAZOLE  IVPB 500 milliGRAM(s) IV Intermittent every 8 hours  norepinephrine Infusion 0.05 MICROgram(s)/kG/Min (6.75 mL/Hr) IV Continuous <Continuous>  OLANZapine Disintegrating Tablet 5 milliGRAM(s) Oral at bedtime  valproate sodium IVPB 750 milliGRAM(s) IV Intermittent every 8 hours    --------------------------------------------------------------------------------------------------  Study Interpretation:    Patient was not cooperative.  3 Technicians required to hook patient up due to issues with cooperation, cognitive limitations.    FINDINGS:  The background was continuous, spontaneously variable and reactive.  During wakefulness, the posteriorly dominant rhythm was absent with mostly admixed irregular delta and some central theta near 5hz    Sleep Background:  Stage II sleep transients were not recorded.    Epileptiform Activity:   No epileptiform discharges were present.    Events:  Frequent abrupt jerky head or limb movements were recorded.  Report of staring per EEG technician.  No seizures were recorded.    Activation Procedures:   -Hyperventilation was not performed.    -Photic stimulation was not performed.    Artifacts:  Intermittent myogenic and movement artifacts were noted.    ECG:  The heart rate on single channel ECG at baseline was predominantly near BPM = 54-60  -----------------------------------------------------------------------------------------------------    EEG Classification / Summary:  Abnormal EEG study, awake   Moderate slowing  Some jerky movements, possible staring spell.  No EEG abnormalities during these.  -----------------------------------------------------------------------------------------------------    Clinical Impression:  Moderate diffuse cerebral dysfunction.  There were no epileptiform abnormalities recorded, including during events noted above.    EEG intermittently limited due to poor cooperation.    -------------------------------------------------------------------------------------------------------  Rahat Brooks M.D.   of Neurology, [facility]	        Electronic Signatures:  Rahat Brooks)  (Signed 23-Apr-2019 15:33)  	Authored: EEG REPORT      Last Updated: 23      [email or web address]

## 2019-04-28 LAB
GLUCOSE BLDC GLUCOMTR-MCNC: 148 MG/DL — HIGH (ref 70–99)
GLUCOSE BLDC GLUCOMTR-MCNC: 64 MG/DL — LOW (ref 70–99)
GLUCOSE BLDC GLUCOMTR-MCNC: 74 MG/DL — SIGNIFICANT CHANGE UP (ref 70–99)
GLUCOSE BLDC GLUCOMTR-MCNC: 75 MG/DL — SIGNIFICANT CHANGE UP (ref 70–99)
GLUCOSE BLDC GLUCOMTR-MCNC: 77 MG/DL — SIGNIFICANT CHANGE UP (ref 70–99)
GLUCOSE BLDC GLUCOMTR-MCNC: 82 MG/DL — SIGNIFICANT CHANGE UP (ref 70–99)
GLUCOSE BLDC GLUCOMTR-MCNC: 83 MG/DL — SIGNIFICANT CHANGE UP (ref 70–99)
GLUCOSE BLDC GLUCOMTR-MCNC: 86 MG/DL — SIGNIFICANT CHANGE UP (ref 70–99)
GLUCOSE BLDC GLUCOMTR-MCNC: 91 MG/DL — SIGNIFICANT CHANGE UP (ref 70–99)

## 2019-04-28 PROCEDURE — 99232 SBSQ HOSP IP/OBS MODERATE 35: CPT | Mod: GC

## 2019-04-28 RX ORDER — DEXTROSE 50 % IN WATER 50 %
15 SYRINGE (ML) INTRAVENOUS ONCE
Refills: 0 | Status: COMPLETED | OUTPATIENT
Start: 2019-04-28 | End: 2019-04-28

## 2019-04-28 RX ORDER — GLUCAGON INJECTION, SOLUTION 0.5 MG/.1ML
1 INJECTION, SOLUTION SUBCUTANEOUS ONCE
Refills: 0 | Status: DISCONTINUED | OUTPATIENT
Start: 2019-04-28 | End: 2019-04-30

## 2019-04-28 RX ORDER — SODIUM CHLORIDE 9 MG/ML
1000 INJECTION, SOLUTION INTRAVENOUS
Refills: 0 | Status: DISCONTINUED | OUTPATIENT
Start: 2019-04-28 | End: 2019-04-30

## 2019-04-28 RX ORDER — NYSTATIN CREAM 100000 [USP'U]/G
1 CREAM TOPICAL THREE TIMES A DAY
Refills: 0 | Status: DISCONTINUED | OUTPATIENT
Start: 2019-04-28 | End: 2019-04-30

## 2019-04-28 RX ORDER — DEXTROSE 50 % IN WATER 50 %
25 SYRINGE (ML) INTRAVENOUS ONCE
Refills: 0 | Status: DISCONTINUED | OUTPATIENT
Start: 2019-04-28 | End: 2019-04-30

## 2019-04-28 RX ORDER — DEXTROSE 50 % IN WATER 50 %
12.5 SYRINGE (ML) INTRAVENOUS ONCE
Refills: 0 | Status: DISCONTINUED | OUTPATIENT
Start: 2019-04-28 | End: 2019-04-30

## 2019-04-28 RX ORDER — DEXTROSE 50 % IN WATER 50 %
15 SYRINGE (ML) INTRAVENOUS ONCE
Refills: 0 | Status: DISCONTINUED | OUTPATIENT
Start: 2019-04-28 | End: 2019-04-30

## 2019-04-28 RX ADMIN — Medication 500 MILLIGRAM(S): at 21:38

## 2019-04-28 RX ADMIN — Medication 15 GRAM(S): at 18:07

## 2019-04-28 RX ADMIN — Medication 750 MILLIGRAM(S): at 22:20

## 2019-04-28 RX ADMIN — Medication 750 MILLIGRAM(S): at 05:19

## 2019-04-28 RX ADMIN — Medication 500 MILLIGRAM(S): at 10:03

## 2019-04-28 RX ADMIN — Medication 750 MILLIGRAM(S): at 13:03

## 2019-04-28 RX ADMIN — ENOXAPARIN SODIUM 40 MILLIGRAM(S): 100 INJECTION SUBCUTANEOUS at 12:54

## 2019-04-28 RX ADMIN — Medication 1 MILLIGRAM(S): at 21:39

## 2019-04-28 RX ADMIN — Medication 500 MILLIGRAM(S): at 05:19

## 2019-04-28 RX ADMIN — LEVETIRACETAM 1000 MILLIGRAM(S): 250 TABLET, FILM COATED ORAL at 01:54

## 2019-04-28 RX ADMIN — LEVETIRACETAM 1000 MILLIGRAM(S): 250 TABLET, FILM COATED ORAL at 10:02

## 2019-04-28 RX ADMIN — Medication 500 MILLIGRAM(S): at 13:03

## 2019-04-28 RX ADMIN — LEVETIRACETAM 1000 MILLIGRAM(S): 250 TABLET, FILM COATED ORAL at 18:15

## 2019-04-28 NOTE — PROGRESS NOTE ADULT - PROBLEM SELECTOR PLAN 1
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  Neurology on board and pt was loaded w/ 1g keppra, 20mg/kg depakote on arrival.  EEG showed no seizure activity however pt not very cooperative w/ test.  - c/w keppra 1000 TID, Depakote 750 TID  - ativan 2mg IV PRN for seizure activity > 3 min  - of pt cannot take PO consistently, may need PEG to ensure medication compliance

## 2019-04-28 NOTE — PROGRESS NOTE ADULT - ATTENDING COMMENTS
Septic shock likely d/t enteritis, s/p pressors in ICU, c/w empiric Cipro/Flagyl, monitor BP  Seizures, EEG w/ no epileptiform discharges, c/w Keppra and Valproic acid   Cerebral palsy, c/w Cogentin   Dysphagia, on dysphagia 1 diet with honey thickened liquids, currently taking PO meds but will otherwise need to consider PEG   Hypernatremia likely d/t mild dehydration, monitor Na and start D5W if worsens  DC to group home if taking PO

## 2019-04-28 NOTE — PROGRESS NOTE ADULT - SUBJECTIVE AND OBJECTIVE BOX
NAYANA JACQUES  54y  Female      Patient is a 54y old  Female who presents with a chief complaint of seizures (27 Apr 2019 14:20)      INTERVAL HPI/OVERNIGHT EVENTS:    Medications:  benztropine 1 milliGRAM(s) Oral at bedtime  ciprofloxacin     Tablet 500 milliGRAM(s) Oral every 12 hours  enoxaparin Injectable 40 milliGRAM(s) SubCutaneous daily  levETIRAcetam 1000 milliGRAM(s) Oral <User Schedule>  metroNIDAZOLE    Tablet 500 milliGRAM(s) Oral every 8 hours  valproic  acid Syrup 750 milliGRAM(s) Oral every 8 hours    T(C): 36.3 (04-28-19 @ 05:15), Max: 36.3 (04-28-19 @ 01:33)  HR: 75 (04-28-19 @ 05:15) (69 - 84)  BP: 108/62 (04-28-19 @ 05:15) (105/64 - 162/94)  RR: 16 (04-28-19 @ 05:15) (16 - 17)  SpO2: 95% (04-28-19 @ 05:15) (95% - 97%)  Wt(kg): --Vital Signs Last 24 Hrs  T(C): 36.3 (28 Apr 2019 05:15), Max: 36.3 (28 Apr 2019 01:33)  T(F): 97.3 (28 Apr 2019 05:15), Max: 97.4 (28 Apr 2019 01:33)  HR: 75 (28 Apr 2019 05:15) (69 - 84)  BP: 108/62 (28 Apr 2019 05:15) (105/64 - 162/94)  BP(mean): --  RR: 16 (28 Apr 2019 05:15) (16 - 17)  SpO2: 95% (28 Apr 2019 05:15) (95% - 97%)    PHYSICAL EXAM:        Consultant(s) Notes Reviewed:  [x ] YES  [ ] NO  Care Discussed with Consultants/Other Providers [ x] YES  [ ] NO    LABS:                        14.4   4.63  )-----------( 182      ( 27 Apr 2019 10:47 )             45.2     04-27    147<H>  |  103  |  4<L>  ----------------------------<  90  3.6   |  31  |  0.54    Ca    9.4      27 Apr 2019 10:47  Phos  3.9     04-27  Mg     1.7     04-27    POCT Blood Glucose.: 82 mg/dL (28 Apr 2019 06:21)  POCT Blood Glucose.: 104 mg/dL (27 Apr 2019 23:34)  POCT Blood Glucose.: 101 mg/dL (27 Apr 2019 18:02)  POCT Blood Glucose.: 82 mg/dL (27 Apr 2019 12:53)  POCT Blood Glucose.: 81 mg/dL (27 Apr 2019 08:31) NAYANA JACQUES  54y  Female      Patient is a 54y old  Female who presents with a chief complaint of seizures (27 Apr 2019 14:20)      INTERVAL HPI/OVERNIGHT EVENTS:  -pt was hypothermic overnight, warming blanket ordered  -pt able to tolerate PO medications    Medications:  benztropine 1 milliGRAM(s) Oral at bedtime  ciprofloxacin     Tablet 500 milliGRAM(s) Oral every 12 hours  enoxaparin Injectable 40 milliGRAM(s) SubCutaneous daily  levETIRAcetam 1000 milliGRAM(s) Oral <User Schedule>  metroNIDAZOLE    Tablet 500 milliGRAM(s) Oral every 8 hours  valproic  acid Syrup 750 milliGRAM(s) Oral every 8 hours    T(C): 36.3 (04-28-19 @ 05:15), Max: 36.3 (04-28-19 @ 01:33)  HR: 75 (04-28-19 @ 05:15) (69 - 84)  BP: 108/62 (04-28-19 @ 05:15) (105/64 - 162/94)  RR: 16 (04-28-19 @ 05:15) (16 - 17)  SpO2: 95% (04-28-19 @ 05:15) (95% - 97%)  Wt(kg): --Vital Signs Last 24 Hrs  T(C): 36.3 (28 Apr 2019 05:15), Max: 36.3 (28 Apr 2019 01:33)  T(F): 97.3 (28 Apr 2019 05:15), Max: 97.4 (28 Apr 2019 01:33)  HR: 75 (28 Apr 2019 05:15) (69 - 84)  BP: 108/62 (28 Apr 2019 05:15) (105/64 - 162/94)  BP(mean): --  RR: 16 (28 Apr 2019 05:15) (16 - 17)  SpO2: 95% (28 Apr 2019 05:15) (95% - 97%)    PHYSICAL EXAM:  	GENERAL: Awake and alert w/ contracture of body   	HEAD: NCAT   	EYES: PERRL, EOMI  	NECK: supple  	CHEST/LUNG: CTAB, no wheeze or crackles   	HEART: S1 S2, rr, no murmurs  	ABDOMEN: soft, + BS  	EXTREMITIES: LE contracted, edema in b/l feet, palpable DP pulses    	NEUROLOGY: AAOx0, awake and alert, moves UE    SKIN: no ulcers or rashes      Consultant(s) Notes Reviewed:  [x ] YES  [ ] NO  Care Discussed with Consultants/Other Providers [ x] YES  [ ] NO    LABS:                        14.4   4.63  )-----------( 182      ( 27 Apr 2019 10:47 )             45.2     04-27    147<H>  |  103  |  4<L>  ----------------------------<  90  3.6   |  31  |  0.54    Ca    9.4      27 Apr 2019 10:47  Phos  3.9     04-27  Mg     1.7     04-27    POCT Blood Glucose.: 82 mg/dL (28 Apr 2019 06:21)  POCT Blood Glucose.: 104 mg/dL (27 Apr 2019 23:34)  POCT Blood Glucose.: 101 mg/dL (27 Apr 2019 18:02)  POCT Blood Glucose.: 82 mg/dL (27 Apr 2019 12:53)  POCT Blood Glucose.: 81 mg/dL (27 Apr 2019 08:31)

## 2019-04-28 NOTE — PROGRESS NOTE ADULT - ASSESSMENT
53F with h/o CP (non-verbal, non-ambulatory, wheelchair-bound at baseline) admitted for seizures and sepsis (resolved) 2/2 enteritis.

## 2019-04-28 NOTE — PROGRESS NOTE ADULT - PROBLEM SELECTOR PLAN 2
CT abd/pelvis showed evidence of enteritis in the jejenum  - s/p broad spectrum abx started when patient septic  - pt not having diarrhea so stool studies not sent  - c/ow cipro/flagyl x 7 days (day 6/7)

## 2019-04-28 NOTE — PROGRESS NOTE ADULT - SUBJECTIVE AND OBJECTIVE BOX
HPI:  53F with h/o CP (non-verbal, non-ambulatory, wheelchair-bound at baseline) presenting from care home with seizures. Information from charts. Patient reported to have 5 separate seizures described as "facial clenching and generalized convulsions" lasting 30 sec, with episodes of NB emesis afterwards. Last seizure at 3:30PM. No known falls, head trauma, or fever. Of note, patient with hospitalization (3/19-4/9) for seizures 2/2 medication noncompliance.     In the ED:   Vitals: 98.6F (rectal), , 108/85, RR 20, 97% on RA  Notable Labs and Imaging: Negative UA, CTH negative, CT A/P showing mild thickening of proximal jejunal loops with associated mild mesenteric edema likely reflective of an enteritis.  Given: 3L NS, Versed 7mg IM, Keppra 1g, valproate 1440mg IV, CTX 400mg x1, zosyn x1.     Patient became hypotensive to 77/31, hypothermic to 95.3F, and lópez to 40s. Was started on levophed, bear-hugger was ordered. (23 Apr 2019 05:51)      Past Medical History  Intellectual disability  Constipation  Seizure disorder  Cerebral palsy      Past Surgical History  No significant past surgical history      MEDICATIONS    benztropine 1 milliGRAM(s) Oral at bedtime  ciprofloxacin     Tablet 500 milliGRAM(s) Oral every 12 hours  levETIRAcetam 1000 milliGRAM(s) Oral <User Schedule>  metroNIDAZOLE    Tablet 500 milliGRAM(s) Oral every 8 hours  valproic  acid Syrup 750 milliGRAM(s) Oral every 8 hours         Family history: No history of dementia, strokes, or seizures   FAMILY HISTORY:  No pertinent family history in first degree relatives    SOCIAL HISTORY -- No history of tobacco or alcohol use     Allergies    Topamax (Unknown)    Intolerances            Vital Signs Last 24 Hrs  T(C): 36.8  HR: 76  BP: 128/86  RR: 16    On Neurological Examination:    Mental Status - Patient is alert, awake,   Follow simple commands       Cranial Nerves - Extraocular muscle intact  WINSTON Facial symmetry Tongue midline, CnV1to V3 intact gross hearing intact      Motor Exam -   Right upper  5/5 throughout  Left upper 5/5 throughtout  Right lower- 4/5 throughout  Left lower 4/5 throughout  Coordination -finger to nose intact  Muscle tone - is normal all over. No asymmetry is seen.      Sensory    Bilateral intact to light touch    GENERAL Exam:     Nontoxic , No Acute Distress   	  HEENT:  normocephalic, atraumatic  		  LUNGS:	Clear bilaterally  No Wheeze      VASCULAR: no carotid brui  	  HEART:	 Normal S1S2   No murmur RRR        	  MUSCULOSKELETAL: Normal Range of Motion  	   SKIN:      Normal   No Ecchymosis               LABS:  CBC Full  -  ( 26 Apr 2019 05:09 )  WBC Count : 4.72 K/uL  RBC Count : 3.90 M/uL  Hemoglobin : 12.3 g/dL  Hematocrit : 39.5 %  Platelet Count - Automated : 109 K/uL  Mean Cell Volume : 101.3 fL  Mean Cell Hemoglobin : 31.5 pg  Mean Cell Hemoglobin Concentration : 31.1 %  Auto Neutrophil # : x  Auto Lymphocyte # : x  Auto Monocyte # : x  Auto Eosinophil # : x  Auto Basophil # : x  Auto Neutrophil % : x  Auto Lymphocyte % : x  Auto Monocyte % : x  Auto Eosinophil % : x  Auto Basophil % : x      04-26    145  |  105  |  4<L>  ----------------------------<  117<H>  3.9   |  28  |  0.48<L>    Ca    8.8      26 Apr 2019 05:09  Phos  3.9     04-26  Mg     1.7     04-26      Hemoglobin A1C:       Vitamin B12         RADIOLOGY    EEG    T:    EEG Report:  · EEG Report		  NAYANA JACQUES [id number]     Study Date: 		04-23-19 extended EEG > 1hr    ROUTINE EEG    Technical Information:			  		  Placement and Labeling of Electrodes:  The EEG was performed utilizing 20 channels referential EEG connections (coronal over temporal over parasagittal montage) using all standard 10-20 electrode placements with EKG.  Recording was at a sampling rate of 256 samples per second per channel.  Time synchronized digital video recording was done simultaneously with EEG recording.  A low light infrared camera was used for low light recording.  Valerio and seizure detection algorithms were utilized.    CSA Technical Component:  Quantitative EEG analysis using a separate Compressed Spectral Array (CSA) software package was conducted in real-time and run at bedside after set up by the technician, digitally displaying the power of electrographic frequencies included in the 1-30Hz band using a graded color map.  This data was reviewed and interpreted independently, and is reported in a separate section below.    --------------------------------------------------------------------------------------------------  History:  CC/ HPI Patient is a 53y old  Female who presents with a chief complaint of seizures (23 Apr 2019 05:51)    MEDICATIONS  (STANDING):  benztropine 1 milliGRAM(s) Oral at bedtime  chlorhexidine 4% Liquid 1 Application(s) Topical <User Schedule>  ciprofloxacin   IVPB 400 milliGRAM(s) IV Intermittent every 12 hours  enoxaparin Injectable 40 milliGRAM(s) SubCutaneous daily  lactated ringers. 1000 milliLiter(s) (75 mL/Hr) IV Continuous <Continuous>  levETIRAcetam  IVPB 1000 milliGRAM(s) IV Intermittent <User Schedule>  metroNIDAZOLE  IVPB 500 milliGRAM(s) IV Intermittent every 8 hours  norepinephrine Infusion 0.05 MICROgram(s)/kG/Min (6.75 mL/Hr) IV Continuous <Continuous>  OLANZapine Disintegrating Tablet 5 milliGRAM(s) Oral at bedtime  valproate sodium IVPB 750 milliGRAM(s) IV Intermittent every 8 hours    --------------------------------------------------------------------------------------------------  Study Interpretation:    Patient was not cooperative.  3 Technicians required to hook patient up due to issues with cooperation, cognitive limitations.    FINDINGS:  The background was continuous, spontaneously variable and reactive.  During wakefulness, the posteriorly dominant rhythm was absent with mostly admixed irregular delta and some central theta near 5hz    Sleep Background:  Stage II sleep transients were not recorded.    Epileptiform Activity:   No epileptiform discharges were present.    Events:  Frequent abrupt jerky head or limb movements were recorded.  Report of staring per EEG technician.  No seizures were recorded.    Activation Procedures:   -Hyperventilation was not performed.    -Photic stimulation was not performed.    Artifacts:  Intermittent myogenic and movement artifacts were noted.    ECG:  The heart rate on single channel ECG at baseline was predominantly near BPM = 54-60  -----------------------------------------------------------------------------------------------------    EEG Classification / Summary:  Abnormal EEG study, awake   Moderate slowing  Some jerky movements, possible staring spell.  No EEG abnormalities during these.  -----------------------------------------------------------------------------------------------------    Clinical Impression:  Moderate diffuse cerebral dysfunction.  There were no epileptiform abnormalities recorded, including during events noted above.    EEG intermittently limited due to poor cooperation.    -------------------------------------------------------------------------------------------------------  Rahat Brooks M.D.   of Neurology, [facility]	        Electronic Signatures:  Rahat Brooks)  (Signed 23-Apr-2019 15:33)  	Authored: EEG REPORT      Last Updated: 23      schoenberg     Assessment and Plan:   · Assessment		  53F with h/o CP (non-verbal, non-ambulatory, wheelchair-bound at baseline) presenting from care home with seizures. Information from charts. Patient reported to have 5 separate seizures described as "facial clenching and generalized convulsions" lasting 30 sec, with episodes of NB emesis afterwards. Last seizure at 3:30PM. No known falls, head trauma, or fever. Of note, patient with hospitalization (3/19-4/9) for seizures 2/2 medication noncompliance. Staff denies any seizures    continue keppra and valproic acid      Electronic Signatures:  Schoenberg, Laura Gray (MD)

## 2019-04-29 ENCOUNTER — TRANSCRIPTION ENCOUNTER (OUTPATIENT)
Age: 54
End: 2019-04-29

## 2019-04-29 VITALS — DIASTOLIC BLOOD PRESSURE: 84 MMHG | HEART RATE: 88 BPM | SYSTOLIC BLOOD PRESSURE: 136 MMHG

## 2019-04-29 DIAGNOSIS — E16.2 HYPOGLYCEMIA, UNSPECIFIED: ICD-10-CM

## 2019-04-29 LAB
ANION GAP SERPL CALC-SCNC: 14 MMO/L — SIGNIFICANT CHANGE UP (ref 7–14)
BUN SERPL-MCNC: 7 MG/DL — SIGNIFICANT CHANGE UP (ref 7–23)
CALCIUM SERPL-MCNC: 9.2 MG/DL — SIGNIFICANT CHANGE UP (ref 8.4–10.5)
CHLORIDE SERPL-SCNC: 106 MMOL/L — SIGNIFICANT CHANGE UP (ref 98–107)
CO2 SERPL-SCNC: 29 MMOL/L — SIGNIFICANT CHANGE UP (ref 22–31)
CREAT SERPL-MCNC: 0.57 MG/DL — SIGNIFICANT CHANGE UP (ref 0.5–1.3)
GLUCOSE BLDC GLUCOMTR-MCNC: 69 MG/DL — LOW (ref 70–99)
GLUCOSE BLDC GLUCOMTR-MCNC: 69 MG/DL — LOW (ref 70–99)
GLUCOSE BLDC GLUCOMTR-MCNC: 89 MG/DL — SIGNIFICANT CHANGE UP (ref 70–99)
GLUCOSE BLDC GLUCOMTR-MCNC: 89 MG/DL — SIGNIFICANT CHANGE UP (ref 70–99)
GLUCOSE BLDC GLUCOMTR-MCNC: 93 MG/DL — SIGNIFICANT CHANGE UP (ref 70–99)
GLUCOSE BLDC GLUCOMTR-MCNC: 94 MG/DL — SIGNIFICANT CHANGE UP (ref 70–99)
GLUCOSE BLDC GLUCOMTR-MCNC: 95 MG/DL — SIGNIFICANT CHANGE UP (ref 70–99)
GLUCOSE BLDC GLUCOMTR-MCNC: 95 MG/DL — SIGNIFICANT CHANGE UP (ref 70–99)
GLUCOSE BLDC GLUCOMTR-MCNC: 99 MG/DL — SIGNIFICANT CHANGE UP (ref 70–99)
GLUCOSE SERPL-MCNC: 109 MG/DL — HIGH (ref 70–99)
POTASSIUM SERPL-MCNC: 4 MMOL/L — SIGNIFICANT CHANGE UP (ref 3.5–5.3)
POTASSIUM SERPL-SCNC: 4 MMOL/L — SIGNIFICANT CHANGE UP (ref 3.5–5.3)
SODIUM SERPL-SCNC: 149 MMOL/L — HIGH (ref 135–145)

## 2019-04-29 PROCEDURE — 99233 SBSQ HOSP IP/OBS HIGH 50: CPT | Mod: GC

## 2019-04-29 RX ORDER — VALPROIC ACID (AS SODIUM SALT) 250 MG/5ML
15 SOLUTION, ORAL ORAL
Qty: 0 | Refills: 0 | DISCHARGE
Start: 2019-04-29

## 2019-04-29 RX ORDER — DEXTROSE 50 % IN WATER 50 %
15 SYRINGE (ML) INTRAVENOUS ONCE
Refills: 0 | Status: COMPLETED | OUTPATIENT
Start: 2019-04-29 | End: 2019-04-29

## 2019-04-29 RX ADMIN — Medication 750 MILLIGRAM(S): at 13:19

## 2019-04-29 RX ADMIN — Medication 500 MILLIGRAM(S): at 13:19

## 2019-04-29 RX ADMIN — Medication 15 GRAM(S): at 06:50

## 2019-04-29 RX ADMIN — Medication 1 MILLIGRAM(S): at 23:32

## 2019-04-29 RX ADMIN — ENOXAPARIN SODIUM 40 MILLIGRAM(S): 100 INJECTION SUBCUTANEOUS at 13:18

## 2019-04-29 RX ADMIN — Medication 500 MILLIGRAM(S): at 23:31

## 2019-04-29 RX ADMIN — LEVETIRACETAM 1000 MILLIGRAM(S): 250 TABLET, FILM COATED ORAL at 09:14

## 2019-04-29 RX ADMIN — Medication 500 MILLIGRAM(S): at 09:14

## 2019-04-29 RX ADMIN — Medication 500 MILLIGRAM(S): at 05:48

## 2019-04-29 RX ADMIN — LEVETIRACETAM 1000 MILLIGRAM(S): 250 TABLET, FILM COATED ORAL at 01:29

## 2019-04-29 RX ADMIN — LEVETIRACETAM 1000 MILLIGRAM(S): 250 TABLET, FILM COATED ORAL at 17:51

## 2019-04-29 RX ADMIN — Medication 15 GRAM(S): at 07:28

## 2019-04-29 RX ADMIN — Medication 750 MILLIGRAM(S): at 23:31

## 2019-04-29 RX ADMIN — Medication 750 MILLIGRAM(S): at 05:48

## 2019-04-29 RX ADMIN — Medication 15 GRAM(S): at 05:48

## 2019-04-29 RX ADMIN — NYSTATIN CREAM 1 APPLICATION(S): 100000 CREAM TOPICAL at 13:19

## 2019-04-29 RX ADMIN — Medication 15 GRAM(S): at 22:39

## 2019-04-29 RX ADMIN — Medication 15 GRAM(S): at 06:17

## 2019-04-29 RX ADMIN — NYSTATIN CREAM 1 APPLICATION(S): 100000 CREAM TOPICAL at 23:31

## 2019-04-29 RX ADMIN — NYSTATIN CREAM 1 APPLICATION(S): 100000 CREAM TOPICAL at 05:48

## 2019-04-29 NOTE — PROGRESS NOTE ADULT - PROBLEM SELECTOR PLAN 1
2/2 pt non cooperation. Pt refuses many medications and foods which is now causing her to require multiple boluses of oral glucose to maintain glycemic control.   -discussions about PEG tube continuing  -will speak to group home to see if they can care for a PEG  -will c/s GI if family and group home in agreement 2/2 pt non cooperation. Pt refuses many medications and foods which is now causing her to require multiple boluses of oral glucose to maintain glycemic control.   -will c/s GI if family and group home in agreement 2/2 pt non cooperation. Pt refuses many medications and foods which is now causing her to require multiple boluses of oral glucose to maintain glycemic control.   - Encourage PO intake.

## 2019-04-29 NOTE — PROGRESS NOTE ADULT - PROBLEM SELECTOR PLAN 2
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  Neurology on board and pt was loaded w/ 1g keppra, 20mg/kg depakote on arrival.  EEG showed no seizure activity however pt not very cooperative w/ test.  - c/w keppra 1000 TID, Depakote 750 TID  - ativan 2mg IV PRN for seizure activity > 3 min  - if pt cannot take PO consistently, may need PEG to ensure medication compliance Pt presented because of status ellipticus.  Neurology on board and pt was loaded w/ 1g keppra, 20mg/kg depakote on arrival.  EEG showed no seizure activity however pt not very cooperative w/ test.  - c/w keppra 1000 TID, Depakote 750 TID  - ativan 2mg IV PRN for seizure activity > 3 min  - Patient tolerating PO seizure meds well over weekend - however in future if pt cannot take PO consistently, may need PEG to ensure medication compliance Pt presented because of status ellipticus.  Neurology on board and pt was loaded w/ 1g keppra, 20mg/kg depakote on arrival.  EEG showed no seizure activity however pt not very cooperative w/ test.  - c/w keppra 1000 TID, Depakote 750 TID  - ativan 2mg IV PRN for seizure activity > 3 min  - Patient tolerating PO seizure meds well

## 2019-04-29 NOTE — PROVIDER CONTACT NOTE (OTHER) - RECOMMENDATIONS
Fingerstick Q4H
Hyperthermia blanket
15 grams of carbohydrate( glutose gel). Will recheck in 15 minutes and treat until blood glucose is 100 mg/dl or higher.
Followed hypoglycemic protocol as ordered.
15 grams of carbohydrate ( glutose gel) and recheck in 15 minutes and treat until blood glucose is 100 mg/dl or greater.

## 2019-04-29 NOTE — PROGRESS NOTE ADULT - PROBLEM SELECTOR PLAN 3
CT abd/pelvis showed evidence of enteritis in the jejenum  - s/p broad spectrum abx started when patient septic  - pt not having diarrhea so stool studies not sent  - c/ow cipro/flagyl x 7 days (day 7/7) CT abd/pelvis showed evidence of enteritis in the jejenum  - s/p broad spectrum abx started when patient septic  - pt not having diarrhea so stool studies not sent  - cipro/flagyl x 7 days (day 7/7)

## 2019-04-29 NOTE — PROVIDER CONTACT NOTE (OTHER) - ACTION/TREATMENT ORDERED:
Hyperthermia blanket
Hypoglycemic protocol followed as ordered.
Fingerstick Q4H
15 grams of carbohydrate ( glutose gel) and recheck in 15 minutes and treat until blood glucose is 100 mg/dl or greater.
15 grams of carbohydrate( glutose gel).

## 2019-04-29 NOTE — PROVIDER CONTACT NOTE (OTHER) - ASSESSMENT
Pt blood glucose is 69 mg/dl. Pt is asymptomatic
Pt temp is 96.1F
Unable to assess
Patient asymptomatic refused to eat at time.
Pt blood glucose is 69 mg/dl

## 2019-04-29 NOTE — PROGRESS NOTE ADULT - ASSESSMENT
53F with h/o CP (non-verbal, non-ambulatory, wheelchair-bound at baseline) admitted for seizures and sepsis (resolved) 2/2 enteritis. Now concerns for inability to consistently take PO medication, considerations for PEG tube occuring. 53F with h/o CP (non-verbal, non-ambulatory, wheelchair-bound at baseline) admitted for seizures and septic shock POA (resolved) 2/2 enteritis.

## 2019-04-29 NOTE — PROVIDER CONTACT NOTE (OTHER) - BACKGROUND
Patient with DX of Nonintractable epilepsy without status epilepticus, Patient with no HX of DM on fingerstick monitoring due to poor appetite.
Pt was admitted for seizures
non intractable epilepsy without status epilepticus
Pt was admitted for seizures
Pt was admitted for seizures

## 2019-04-29 NOTE — PROGRESS NOTE ADULT - PROBLEM SELECTOR PLAN 5
DVT ppx: Lovenox 40 SQ   Diet: pureed, dysphagia 1, honey thick DVT ppx: Lovenox 40 SQ   Diet: pureed, dysphagia 1, honey thick; Tolerated PO meds and intake over weekend well.

## 2019-04-29 NOTE — DISCHARGE NOTE NURSING/CASE MANAGEMENT/SOCIAL WORK - NSDCDPATPORTLINK_GEN_ALL_CORE
You can access the [facility] Patient Portal, offered by [facility], by registering with the following website: [email or web address]

## 2019-04-29 NOTE — PROGRESS NOTE ADULT - SUBJECTIVE AND OBJECTIVE BOX
NAYANA JACQUES  54y  Female      Patient is a 54y old  Female who presents with a chief complaint of seizures (28 Apr 2019 08:57)      INTERVAL HPI/OVERNIGHT EVENTS:  - pt continues to have hypoglycemic episodes overnight, but taking oral medications well  -ROS negative as pt nonverbal    Medications:  benztropine 1 milliGRAM(s) Oral at bedtime  ciprofloxacin     Tablet 500 milliGRAM(s) Oral every 12 hours  dextrose 40% Gel 15 Gram(s) Oral once PRN  dextrose 5%. 1000 milliLiter(s) IV Continuous <Continuous>  dextrose 50% Injectable 12.5 Gram(s) IV Push once  dextrose 50% Injectable 25 Gram(s) IV Push once  dextrose 50% Injectable 25 Gram(s) IV Push once  enoxaparin Injectable 40 milliGRAM(s) SubCutaneous daily  glucagon  Injectable 1 milliGRAM(s) IntraMuscular once PRN  levETIRAcetam 1000 milliGRAM(s) Oral <User Schedule>  metroNIDAZOLE    Tablet 500 milliGRAM(s) Oral every 8 hours  nystatin Powder 1 Application(s) Topical three times a day  valproic  acid Syrup 750 milliGRAM(s) Oral every 8 hours    T(C): 36.1 (04-29-19 @ 05:29), Max: 36.4 (04-28-19 @ 16:14)  HR: 69 (04-29-19 @ 05:29) (68 - 85)  BP: 128/86 (04-29-19 @ 05:29) (118/71 - 133/89)  RR: 18 (04-29-19 @ 05:29) (18 - 18)  SpO2: 97% (04-29-19 @ 05:29) (95% - 97%)  Wt(kg): --Vital Signs Last 24 Hrs  T(C): 36.1 (29 Apr 2019 05:29), Max: 36.4 (28 Apr 2019 16:14)  T(F): 97 (29 Apr 2019 05:29), Max: 97.5 (28 Apr 2019 16:14)  HR: 69 (29 Apr 2019 05:29) (68 - 85)  BP: 128/86 (29 Apr 2019 05:29) (118/71 - 133/89)  BP(mean): --  RR: 18 (29 Apr 2019 05:29) (18 - 18)  SpO2: 97% (29 Apr 2019 05:29) (95% - 97%)    PHYSICAL EXAM:  GENERAL: Awake and alert w/ contracture of body   HEAD: NCAT   EYES: PERRL, EOMI  NECK: supple  CHEST/LUNG: CTAB, no wheeze or crackles   HEART: S1 S2, rr, no murmurs  ABDOMEN: soft, + BS  EXTREMITIES: LE contracted, edema in b/l feet, palpable DP pulses    NEUROLOGY: AAOx0, awake and alert, moves UE  SKIN: no ulcers or rashes    Consultant(s) Notes Reviewed:  [x ] YES  [ ] NO  Care Discussed with Consultants/Other Providers [ x] YES  [ ] NO    LABS:                        14.4   4.63  )-----------( 182      ( 27 Apr 2019 10:47 )             45.2     04-27    147<H>  |  103  |  4<L>  ----------------------------<  90  3.6   |  31  |  0.54    Ca    9.4      27 Apr 2019 10:47  Phos  3.9     04-27  Mg     1.7     04-27    CAPILLARY BLOOD GLUCOSE  69 (29 Apr 2019 05:39)  POCT Blood Glucose.: 93 mg/dL (29 Apr 2019 07:13)  POCT Blood Glucose.: 99 mg/dL (29 Apr 2019 07:09)  POCT Blood Glucose.: 95 mg/dL (29 Apr 2019 06:41)  POCT Blood Glucose.: 89 mg/dL (29 Apr 2019 06:38)  POCT Blood Glucose.: 89 mg/dL (29 Apr 2019 06:11)  POCT Blood Glucose.: 69 mg/dL (29 Apr 2019 06:06)  POCT Blood Glucose.: 69 mg/dL (29 Apr 2019 05:36)  POCT Blood Glucose.: 91 mg/dL (28 Apr 2019 23:30)  POCT Blood Glucose.: 148 mg/dL (28 Apr 2019 19:13)  POCT Blood Glucose.: 77 mg/dL (28 Apr 2019 18:56)  POCT Blood Glucose.: 74 mg/dL (28 Apr 2019 18:38)  POCT Blood Glucose.: 86 mg/dL (28 Apr 2019 18:19)  POCT Blood Glucose.: 64 mg/dL (28 Apr 2019 17:57)  POCT Blood Glucose.: 83 mg/dL (28 Apr 2019 12:13)  POCT Blood Glucose.: 75 mg/dL (28 Apr 2019 08:33) NAYANA JACQUES  54y  Female      Patient is a 54y old  Female who presents with a chief complaint of seizures (28 Apr 2019 08:57)      INTERVAL HPI/OVERNIGHT EVENTS:  - pt had asymptomatic hypoglycemic episodes this AM, but taking oral medications well  No overnight issues with F/C, vomiting, SOB, Cough, diarrhea.    Medications:  benztropine 1 milliGRAM(s) Oral at bedtime  ciprofloxacin     Tablet 500 milliGRAM(s) Oral every 12 hours  dextrose 40% Gel 15 Gram(s) Oral once PRN  dextrose 5%. 1000 milliLiter(s) IV Continuous <Continuous>  dextrose 50% Injectable 12.5 Gram(s) IV Push once  dextrose 50% Injectable 25 Gram(s) IV Push once  dextrose 50% Injectable 25 Gram(s) IV Push once  enoxaparin Injectable 40 milliGRAM(s) SubCutaneous daily  glucagon  Injectable 1 milliGRAM(s) IntraMuscular once PRN  levETIRAcetam 1000 milliGRAM(s) Oral <User Schedule>  metroNIDAZOLE    Tablet 500 milliGRAM(s) Oral every 8 hours  nystatin Powder 1 Application(s) Topical three times a day  valproic  acid Syrup 750 milliGRAM(s) Oral every 8 hours    T(C): 36.1 (04-29-19 @ 05:29), Max: 36.4 (04-28-19 @ 16:14)  HR: 69 (04-29-19 @ 05:29) (68 - 85)  BP: 128/86 (04-29-19 @ 05:29) (118/71 - 133/89)  RR: 18 (04-29-19 @ 05:29) (18 - 18)  SpO2: 97% (04-29-19 @ 05:29) (95% - 97%)  Wt(kg): --Vital Signs Last 24 Hrs  T(C): 36.1 (29 Apr 2019 05:29), Max: 36.4 (28 Apr 2019 16:14)  T(F): 97 (29 Apr 2019 05:29), Max: 97.5 (28 Apr 2019 16:14)  HR: 69 (29 Apr 2019 05:29) (68 - 85)  BP: 128/86 (29 Apr 2019 05:29) (118/71 - 133/89)  BP(mean): --  RR: 18 (29 Apr 2019 05:29) (18 - 18)  SpO2: 97% (29 Apr 2019 05:29) (95% - 97%)    PHYSICAL EXAM:  GENERAL: Awake and alert w/ contracture of body   HEAD: NCAT   EYES: PERRL, EOMI  NECK: supple  CHEST/LUNG: CTAB, no wheeze or crackles   HEART: S1 S2, rr, no murmurs  ABDOMEN: soft, + BS  EXTREMITIES: LE contracted, edema in b/l feet, palpable DP pulses    NEUROLOGY: AAOx0, awake and alert, moves UE  SKIN: no ulcers or rashes    Consultant(s) Notes Reviewed:  [x ] YES  [ ] NO  Care Discussed with Consultants/Other Providers [ x] YES  [ ] NO    LABS:                        14.4   4.63  )-----------( 182      ( 27 Apr 2019 10:47 )             45.2     04-27    147<H>  |  103  |  4<L>  ----------------------------<  90  3.6   |  31  |  0.54    Ca    9.4      27 Apr 2019 10:47  Phos  3.9     04-27  Mg     1.7     04-27    CAPILLARY BLOOD GLUCOSE  69 (29 Apr 2019 05:39)  POCT Blood Glucose.: 93 mg/dL (29 Apr 2019 07:13)  POCT Blood Glucose.: 99 mg/dL (29 Apr 2019 07:09)  POCT Blood Glucose.: 95 mg/dL (29 Apr 2019 06:41)  POCT Blood Glucose.: 89 mg/dL (29 Apr 2019 06:38)  POCT Blood Glucose.: 89 mg/dL (29 Apr 2019 06:11)  POCT Blood Glucose.: 69 mg/dL (29 Apr 2019 06:06)  POCT Blood Glucose.: 69 mg/dL (29 Apr 2019 05:36)  POCT Blood Glucose.: 91 mg/dL (28 Apr 2019 23:30)  POCT Blood Glucose.: 148 mg/dL (28 Apr 2019 19:13)  POCT Blood Glucose.: 77 mg/dL (28 Apr 2019 18:56)  POCT Blood Glucose.: 74 mg/dL (28 Apr 2019 18:38)  POCT Blood Glucose.: 86 mg/dL (28 Apr 2019 18:19)  POCT Blood Glucose.: 64 mg/dL (28 Apr 2019 17:57)  POCT Blood Glucose.: 83 mg/dL (28 Apr 2019 12:13)  POCT Blood Glucose.: 75 mg/dL (28 Apr 2019 08:33)

## 2019-04-29 NOTE — PROVIDER CONTACT NOTE (OTHER) - SITUATION
Fs 65
Pt blood glucose is 69 mg/dl
Pt blood glucose is 69 mg/dl
Pt temp is 96.1F
Pt with low FS. Pt is not current diabetic. PT was NPO. PT is also MR

## 2019-04-29 NOTE — PROGRESS NOTE ADULT - ATTENDING COMMENTS
Patient medically optimized for discharge.  Discharge planning 40 minutes - discussed with patient's HCP and consultants.  Roque Dupree MD  Pager# [phone]

## 2019-04-30 PROCEDURE — 99239 HOSP IP/OBS DSCHRG MGMT >30: CPT

## 2019-04-30 RX ORDER — DIVALPROEX SODIUM 500 MG/1
1 TABLET, DELAYED RELEASE ORAL
Qty: 90 | Refills: 0
Start: 2019-04-30 | End: 2019-05-29

## 2019-04-30 RX ADMIN — Medication 750 MILLIGRAM(S): at 07:40

## 2019-04-30 RX ADMIN — LEVETIRACETAM 1000 MILLIGRAM(S): 250 TABLET, FILM COATED ORAL at 10:42

## 2019-04-30 RX ADMIN — NYSTATIN CREAM 1 APPLICATION(S): 100000 CREAM TOPICAL at 07:42

## 2019-04-30 RX ADMIN — LEVETIRACETAM 1000 MILLIGRAM(S): 250 TABLET, FILM COATED ORAL at 02:25

## 2019-04-30 NOTE — PROGRESS NOTE ADULT - ASSESSMENT
53F with h/o CP (non-verbal, non-ambulatory, wheelchair-bound at baseline) admitted for seizures and septic shock POA (resolved) 2/2 enteritis.

## 2019-04-30 NOTE — PROGRESS NOTE ADULT - PROBLEM SELECTOR PLAN 5
DVT ppx: Lovenox 40 SQ   Diet: pureed, dysphagia 1, honey thick; Tolerated PO meds and intake over weekend well.

## 2019-04-30 NOTE — PROGRESS NOTE ADULT - PROVIDER SPECIALTY LIST ADULT
Neurology
Internal Medicine
Internal Medicine
Neurology
Internal Medicine

## 2019-04-30 NOTE — PROGRESS NOTE ADULT - SUBJECTIVE AND OBJECTIVE BOX
Ilda Pizano MD  PGY1 | Dept of Internal Medicine  [facility] [phone]        Patient is a 54y old  Female who presents with a chief complaint of seizures (29 Apr 2019 07:29)      SUBJECTIVE / OVERNIGHT EVENTS:  Pt hypoglycemic overnight to 67 which resolved w/ dextrose gel.  No other acute events.  D/C today.         Review of Systems:  unable to obtain 2/2 mental status   General: fatigue [ ], fever/chills [ ], weakness [ ], weight loss [ ]  HEENT: headache [ ], hearing changes [ ], vision changes [ ], hoarseness [ ], sore throat [ ], nasal discharge [ ]  Cardiovascular: chest pain [ ], palpitations [ ], dyspnea on exertion [ ], orthopnea [ ], PND [ ]  Respiratory: SOB [ ], cough [ ], wheeze [ ], exercise intolerance [ ]  Gastrointestinal: abdominal pain [ ], unintentional weight loss [ ], heartburn [ ], nausea [ ], vomiting [ ], hematochezia [ ], melena [ ]  Genitourinary: dysuria [ ], frequency [ ], urgency [ ], hematuria [ ], incontinence [ ]  Skin: lesions [ ], rashes [ ]  Neuro: headache [ ], changes in senses [ ], speech problems [ ], balance problems [ ], numbness/tingling [ ], weakness [ ]          Vital Signs Last 24 Hrs  T(C): 36.6 (29 Apr 2019 15:28), Max: 36.6 (29 Apr 2019 15:28)  T(F): 97.9 (29 Apr 2019 15:28), Max: 97.9 (29 Apr 2019 15:28)  HR: 88 (29 Apr 2019 23:29) (58 - 88)  BP: 136/84 (29 Apr 2019 23:29) (119/86 - 160/67)  BP(mean): --  RR: 19 (29 Apr 2019 15:28) (16 - 19)  SpO2: 95% (29 Apr 2019 15:28) (95% - 96%)    I&O's Summary      PHYSICAL EXAM:  	GENERAL: Awake and alert w/ contracture of body   	HEAD: NCAT   	EYES: PERRL, EOMI  	NECK: supple  	CHEST/LUNG: CTAB, no wheeze or crackles   	HEART: S1 S2, rr, no murmurs  	ABDOMEN: soft, + BS  	EXTREMITIES: LE contracted, edema in b/l feet, palpable DP pulses    	NEUROLOGY: AAOx0, awake and alert, moves UE    SKIN: no ulcers or rashes      MEDICATIONS  (STANDING):  benztropine 1 milliGRAM(s) Oral at bedtime  dextrose 5%. 1000 milliLiter(s) (50 mL/Hr) IV Continuous <Continuous>  dextrose 50% Injectable 12.5 Gram(s) IV Push once  dextrose 50% Injectable 25 Gram(s) IV Push once  dextrose 50% Injectable 25 Gram(s) IV Push once  enoxaparin Injectable 40 milliGRAM(s) SubCutaneous daily  levETIRAcetam 1000 milliGRAM(s) Oral <User Schedule>  nystatin Powder 1 Application(s) Topical three times a day  valproic  acid Syrup 750 milliGRAM(s) Oral every 8 hours    MEDICATIONS  (PRN):  dextrose 40% Gel 15 Gram(s) Oral once PRN Blood Glucose LESS THAN 70 milliGRAM(s)/deciLiter  glucagon  Injectable 1 milliGRAM(s) IntraMuscular once PRN Glucose <70 milliGRAM(s)/deciLiter      LABS:  CAPILLARY BLOOD GLUCOSE      POCT Blood Glucose.: 173 mg/dL (29 Apr 2019 23:24)  POCT Blood Glucose.: 67 mg/dL (29 Apr 2019 22:15)  POCT Blood Glucose.: 88 mg/dL (29 Apr 2019 17:44)  POCT Blood Glucose.: 122 mg/dL (29 Apr 2019 12:07)  POCT Blood Glucose.: 94 mg/dL (29 Apr 2019 08:02)  POCT Blood Glucose.: 95 mg/dL (29 Apr 2019 07:57)  POCT Blood Glucose.: 93 mg/dL (29 Apr 2019 07:13)      Hgb Trend: 14.4<--, 12.3<--, 14.2<--, 11.6<--  04-29    149<H>  |  106  |  7   ----------------------------<  109<H>  4.0   |  29  |  0.57    Ca    9.2      29 Apr 2019 07:10    Creatinine Trend: 0.57<--, 0.54<--, 0.48<--, 0.41<--, 0.51<--, 0.57<--

## 2019-04-30 NOTE — PROGRESS NOTE ADULT - PROBLEM SELECTOR PROBLEM 1
Seizure disorder
Seizure disorder
Hypoglycemia
Seizure disorder
Seizure disorder
Hypoglycemia
Seizure disorder

## 2019-04-30 NOTE — PROGRESS NOTE ADULT - PROBLEM SELECTOR PLAN 2
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  Neurology on board and pt was loaded w/ 1g keppra, 20mg/kg depakote on arrival.  EEG showed no seizure activity however pt not very cooperative w/ test.  - c/w keppra 1000 TID, Depakote 750 TID  - ativan 2mg IV PRN for seizure activity > 3 min  - Patient tolerating PO seizure meds well

## 2019-04-30 NOTE — PROGRESS NOTE ADULT - ATTENDING COMMENTS
Patient medically optimized for discharge.  Discharge planning 40 minutes - discussed with patient and consultants.  Roque Dupree MD  Pager# [phone]

## 2019-04-30 NOTE — PROGRESS NOTE ADULT - PROBLEM SELECTOR PLAN 1
2/2 pt non cooperation. Pt refuses many medications and foods which is now causing her to require multiple boluses of oral glucose to maintain glycemic control.   - Encourage PO intake.

## 2019-05-10 LAB — LEVETIRACETAM SERPL-MCNC: 23.9 — SIGNIFICANT CHANGE UP

## 2019-08-18 ENCOUNTER — INPATIENT (INPATIENT)
Facility: HOSPITAL | Age: 54
LOS: 36 days | Discharge: NURSING, REHAB OR LONG-TERM CARE | End: 2019-09-24
Attending: HOSPITALIST | Admitting: HOSPITALIST
Payer: MEDICARE

## 2019-08-18 VITALS
SYSTOLIC BLOOD PRESSURE: 114 MMHG | RESPIRATION RATE: 18 BRPM | OXYGEN SATURATION: 98 % | TEMPERATURE: 96 F | HEART RATE: 98 BPM | DIASTOLIC BLOOD PRESSURE: 66 MMHG

## 2019-08-18 DIAGNOSIS — J18.9 PNEUMONIA, UNSPECIFIED ORGANISM: ICD-10-CM

## 2019-08-18 LAB
ALBUMIN SERPL ELPH-MCNC: 2.8 G/DL — LOW (ref 3.3–5)
ALBUMIN SERPL ELPH-MCNC: 3.2 G/DL — LOW (ref 3.3–5)
ALP SERPL-CCNC: 61 U/L — SIGNIFICANT CHANGE UP (ref 40–120)
ALP SERPL-CCNC: 68 U/L — SIGNIFICANT CHANGE UP (ref 40–120)
ALT FLD-CCNC: 18 U/L — SIGNIFICANT CHANGE UP (ref 4–33)
ALT FLD-CCNC: 20 U/L — SIGNIFICANT CHANGE UP (ref 4–33)
ANION GAP SERPL CALC-SCNC: 12 MMO/L — SIGNIFICANT CHANGE UP (ref 7–14)
ANION GAP SERPL CALC-SCNC: 15 MMO/L — HIGH (ref 7–14)
APPEARANCE UR: CLEAR — SIGNIFICANT CHANGE UP
APTT BLD: 35.1 SEC — SIGNIFICANT CHANGE UP (ref 27.5–36.3)
AST SERPL-CCNC: 22 U/L — SIGNIFICANT CHANGE UP (ref 4–32)
AST SERPL-CCNC: 31 U/L — SIGNIFICANT CHANGE UP (ref 4–32)
BACTERIA # UR AUTO: SIGNIFICANT CHANGE UP
BASE EXCESS BLDA CALC-SCNC: 0.3 MMOL/L — SIGNIFICANT CHANGE UP
BASE EXCESS BLDV CALC-SCNC: 4.5 MMOL/L — SIGNIFICANT CHANGE UP
BASE EXCESS BLDV CALC-SCNC: 5.8 MMOL/L — SIGNIFICANT CHANGE UP
BASOPHILS # BLD AUTO: 0.04 K/UL — SIGNIFICANT CHANGE UP (ref 0–0.2)
BASOPHILS # BLD AUTO: 0.04 K/UL — SIGNIFICANT CHANGE UP (ref 0–0.2)
BASOPHILS NFR BLD AUTO: 0.2 % — SIGNIFICANT CHANGE UP (ref 0–2)
BASOPHILS NFR BLD AUTO: 0.2 % — SIGNIFICANT CHANGE UP (ref 0–2)
BASOPHILS NFR SPEC: 0 % — SIGNIFICANT CHANGE UP (ref 0–2)
BILIRUB SERPL-MCNC: 0.2 MG/DL — SIGNIFICANT CHANGE UP (ref 0.2–1.2)
BILIRUB SERPL-MCNC: 0.2 MG/DL — SIGNIFICANT CHANGE UP (ref 0.2–1.2)
BILIRUB UR-MCNC: NEGATIVE — SIGNIFICANT CHANGE UP
BLD GP AB SCN SERPL QL: NEGATIVE — SIGNIFICANT CHANGE UP
BLOOD GAS VENOUS - CREATININE: 0.63 MG/DL — SIGNIFICANT CHANGE UP (ref 0.5–1.3)
BLOOD GAS VENOUS - CREATININE: 0.67 MG/DL — SIGNIFICANT CHANGE UP (ref 0.5–1.3)
BLOOD UR QL VISUAL: NEGATIVE — SIGNIFICANT CHANGE UP
BUN SERPL-MCNC: 14 MG/DL — SIGNIFICANT CHANGE UP (ref 7–23)
BUN SERPL-MCNC: 18 MG/DL — SIGNIFICANT CHANGE UP (ref 7–23)
CALCIUM SERPL-MCNC: 8.2 MG/DL — LOW (ref 8.4–10.5)
CALCIUM SERPL-MCNC: 9.8 MG/DL — SIGNIFICANT CHANGE UP (ref 8.4–10.5)
CHLORIDE BLDA-SCNC: 111 MMOL/L — HIGH (ref 96–108)
CHLORIDE BLDV-SCNC: 104 MMOL/L — SIGNIFICANT CHANGE UP (ref 96–108)
CHLORIDE BLDV-SCNC: 107 MMOL/L — SIGNIFICANT CHANGE UP (ref 96–108)
CHLORIDE SERPL-SCNC: 103 MMOL/L — SIGNIFICANT CHANGE UP (ref 98–107)
CHLORIDE SERPL-SCNC: 108 MMOL/L — HIGH (ref 98–107)
CO2 SERPL-SCNC: 24 MMOL/L — SIGNIFICANT CHANGE UP (ref 22–31)
CO2 SERPL-SCNC: 27 MMOL/L — SIGNIFICANT CHANGE UP (ref 22–31)
COLOR SPEC: YELLOW — SIGNIFICANT CHANGE UP
CREAT SERPL-MCNC: 0.44 MG/DL — LOW (ref 0.5–1.3)
CREAT SERPL-MCNC: 0.57 MG/DL — SIGNIFICANT CHANGE UP (ref 0.5–1.3)
EOSINOPHIL # BLD AUTO: 0.01 K/UL — SIGNIFICANT CHANGE UP (ref 0–0.5)
EOSINOPHIL # BLD AUTO: 0.05 K/UL — SIGNIFICANT CHANGE UP (ref 0–0.5)
EOSINOPHIL NFR BLD AUTO: 0 % — SIGNIFICANT CHANGE UP (ref 0–6)
EOSINOPHIL NFR BLD AUTO: 0.3 % — SIGNIFICANT CHANGE UP (ref 0–6)
EOSINOPHIL NFR FLD: 0 % — SIGNIFICANT CHANGE UP (ref 0–6)
GAS PNL BLDV: 144 MMOL/L — SIGNIFICANT CHANGE UP (ref 136–146)
GAS PNL BLDV: 145 MMOL/L — SIGNIFICANT CHANGE UP (ref 136–146)
GLUCOSE BLDA-MCNC: 117 MG/DL — HIGH (ref 70–99)
GLUCOSE BLDV-MCNC: 131 MG/DL — HIGH (ref 70–99)
GLUCOSE BLDV-MCNC: 156 MG/DL — HIGH (ref 70–99)
GLUCOSE SERPL-MCNC: 120 MG/DL — HIGH (ref 70–99)
GLUCOSE SERPL-MCNC: 161 MG/DL — HIGH (ref 70–99)
GLUCOSE UR-MCNC: 500 — HIGH
HCO3 BLDA-SCNC: 24 MMOL/L — SIGNIFICANT CHANGE UP (ref 22–26)
HCO3 BLDV-SCNC: 26 MMOL/L — SIGNIFICANT CHANGE UP (ref 20–27)
HCO3 BLDV-SCNC: 27 MMOL/L — SIGNIFICANT CHANGE UP (ref 20–27)
HCT VFR BLD CALC: 36.7 % — SIGNIFICANT CHANGE UP (ref 34.5–45)
HCT VFR BLD CALC: 40.7 % — SIGNIFICANT CHANGE UP (ref 34.5–45)
HCT VFR BLDA CALC: 34.6 % — SIGNIFICANT CHANGE UP (ref 34.5–46.5)
HCT VFR BLDV CALC: 33.4 % — LOW (ref 34.5–45)
HCT VFR BLDV CALC: 39.5 % — SIGNIFICANT CHANGE UP (ref 34.5–45)
HGB BLD-MCNC: 11 G/DL — LOW (ref 11.5–15.5)
HGB BLD-MCNC: 12.4 G/DL — SIGNIFICANT CHANGE UP (ref 11.5–15.5)
HGB BLDA-MCNC: 11.2 G/DL — LOW (ref 11.5–15.5)
HGB BLDV-MCNC: 10.8 G/DL — LOW (ref 11.5–15.5)
HGB BLDV-MCNC: 12.8 G/DL — SIGNIFICANT CHANGE UP (ref 11.5–15.5)
HYALINE CASTS # UR AUTO: NEGATIVE — SIGNIFICANT CHANGE UP
IMM GRANULOCYTES NFR BLD AUTO: 0.6 % — SIGNIFICANT CHANGE UP (ref 0–1.5)
IMM GRANULOCYTES NFR BLD AUTO: 0.9 % — SIGNIFICANT CHANGE UP (ref 0–1.5)
INR BLD: 1.04 — SIGNIFICANT CHANGE UP (ref 0.88–1.17)
KETONES UR-MCNC: SIGNIFICANT CHANGE UP
LACTATE BLDA-SCNC: 1.1 MMOL/L — SIGNIFICANT CHANGE UP (ref 0.5–2)
LACTATE BLDV-MCNC: 3.7 MMOL/L — HIGH (ref 0.5–2)
LACTATE BLDV-MCNC: 5.9 MMOL/L — CRITICAL HIGH (ref 0.5–2)
LEUKOCYTE ESTERASE UR-ACNC: NEGATIVE — SIGNIFICANT CHANGE UP
LG PLATELETS BLD QL AUTO: SLIGHT — SIGNIFICANT CHANGE UP
LYMPHOCYTES # BLD AUTO: 0.85 K/UL — LOW (ref 1–3.3)
LYMPHOCYTES # BLD AUTO: 1.97 K/UL — SIGNIFICANT CHANGE UP (ref 1–3.3)
LYMPHOCYTES # BLD AUTO: 4.5 % — LOW (ref 13–44)
LYMPHOCYTES # BLD AUTO: 7.6 % — LOW (ref 13–44)
LYMPHOCYTES NFR SPEC AUTO: 8 % — LOW (ref 13–44)
MACROCYTES BLD QL: SIGNIFICANT CHANGE UP
MAGNESIUM SERPL-MCNC: 1.3 MG/DL — LOW (ref 1.6–2.6)
MANUAL SMEAR VERIFICATION: SIGNIFICANT CHANGE UP
MCHC RBC-ENTMCNC: 30 % — LOW (ref 32–36)
MCHC RBC-ENTMCNC: 30.5 % — LOW (ref 32–36)
MCHC RBC-ENTMCNC: 32.4 PG — SIGNIFICANT CHANGE UP (ref 27–34)
MCHC RBC-ENTMCNC: 32.5 PG — SIGNIFICANT CHANGE UP (ref 27–34)
MCV RBC AUTO: 106.3 FL — HIGH (ref 80–100)
MCV RBC AUTO: 108.6 FL — HIGH (ref 80–100)
MONOCYTES # BLD AUTO: 2.23 K/UL — HIGH (ref 0–0.9)
MONOCYTES # BLD AUTO: 2.91 K/UL — HIGH (ref 0–0.9)
MONOCYTES NFR BLD AUTO: 11.3 % — SIGNIFICANT CHANGE UP (ref 2–14)
MONOCYTES NFR BLD AUTO: 11.7 % — SIGNIFICANT CHANGE UP (ref 2–14)
MONOCYTES NFR BLD: 11 % — HIGH (ref 2–9)
NEUTROPHIL AB SER-ACNC: 55 % — SIGNIFICANT CHANGE UP (ref 43–77)
NEUTROPHILS # BLD AUTO: 15.76 K/UL — HIGH (ref 1.8–7.4)
NEUTROPHILS # BLD AUTO: 20.61 K/UL — HIGH (ref 1.8–7.4)
NEUTROPHILS NFR BLD AUTO: 80 % — HIGH (ref 43–77)
NEUTROPHILS NFR BLD AUTO: 82.7 % — HIGH (ref 43–77)
NEUTS BAND # BLD: 26 % — HIGH (ref 0–6)
NITRITE UR-MCNC: NEGATIVE — SIGNIFICANT CHANGE UP
NRBC # BLD: 0 /100WBC — SIGNIFICANT CHANGE UP
NRBC # FLD: 0 K/UL — SIGNIFICANT CHANGE UP (ref 0–0)
NRBC # FLD: 0 K/UL — SIGNIFICANT CHANGE UP (ref 0–0)
PCO2 BLDA: 55 MMHG — HIGH (ref 32–48)
PCO2 BLDV: 65 MMHG — HIGH (ref 41–51)
PCO2 BLDV: 75 MMHG — HIGH (ref 41–51)
PH BLDA: 7.3 PH — LOW (ref 7.35–7.45)
PH BLDV: 7.26 PH — LOW (ref 7.32–7.43)
PH BLDV: 7.3 PH — LOW (ref 7.32–7.43)
PH UR: 5.5 — SIGNIFICANT CHANGE UP (ref 5–8)
PHOSPHATE SERPL-MCNC: 3.1 MG/DL — SIGNIFICANT CHANGE UP (ref 2.5–4.5)
PLATELET # BLD AUTO: 112 K/UL — LOW (ref 150–400)
PLATELET # BLD AUTO: 127 K/UL — LOW (ref 150–400)
PMV BLD: 10.5 FL — SIGNIFICANT CHANGE UP (ref 7–13)
PMV BLD: 10.7 FL — SIGNIFICANT CHANGE UP (ref 7–13)
PO2 BLDA: 116 MMHG — HIGH (ref 83–108)
PO2 BLDV: 31 MMHG — LOW (ref 35–40)
PO2 BLDV: 41 MMHG — HIGH (ref 35–40)
POTASSIUM BLDA-SCNC: 3.3 MMOL/L — LOW (ref 3.4–4.5)
POTASSIUM BLDV-SCNC: 3.2 MMOL/L — LOW (ref 3.4–4.5)
POTASSIUM BLDV-SCNC: 3.2 MMOL/L — LOW (ref 3.4–4.5)
POTASSIUM SERPL-MCNC: 3.4 MMOL/L — LOW (ref 3.5–5.3)
POTASSIUM SERPL-MCNC: 3.5 MMOL/L — SIGNIFICANT CHANGE UP (ref 3.5–5.3)
POTASSIUM SERPL-SCNC: 3.4 MMOL/L — LOW (ref 3.5–5.3)
POTASSIUM SERPL-SCNC: 3.5 MMOL/L — SIGNIFICANT CHANGE UP (ref 3.5–5.3)
PROT SERPL-MCNC: 6.3 G/DL — SIGNIFICANT CHANGE UP (ref 6–8.3)
PROT SERPL-MCNC: 7.3 G/DL — SIGNIFICANT CHANGE UP (ref 6–8.3)
PROT UR-MCNC: 50 — SIGNIFICANT CHANGE UP
PROTHROM AB SERPL-ACNC: 11.9 SEC — SIGNIFICANT CHANGE UP (ref 9.8–13.1)
RBC # BLD: 3.38 M/UL — LOW (ref 3.8–5.2)
RBC # BLD: 3.83 M/UL — SIGNIFICANT CHANGE UP (ref 3.8–5.2)
RBC # FLD: 15.4 % — HIGH (ref 10.3–14.5)
RBC # FLD: 15.6 % — HIGH (ref 10.3–14.5)
RBC CASTS # UR COMP ASSIST: SIGNIFICANT CHANGE UP (ref 0–?)
RH IG SCN BLD-IMP: NEGATIVE — SIGNIFICANT CHANGE UP
SAO2 % BLDA: 98 % — SIGNIFICANT CHANGE UP (ref 95–99)
SAO2 % BLDV: 41.1 % — LOW (ref 60–85)
SAO2 % BLDV: 65.4 % — SIGNIFICANT CHANGE UP (ref 60–85)
SODIUM BLDA-SCNC: 143 MMOL/L — SIGNIFICANT CHANGE UP (ref 136–146)
SODIUM SERPL-SCNC: 144 MMOL/L — SIGNIFICANT CHANGE UP (ref 135–145)
SODIUM SERPL-SCNC: 145 MMOL/L — SIGNIFICANT CHANGE UP (ref 135–145)
SP GR SPEC: 1.03 — SIGNIFICANT CHANGE UP (ref 1–1.04)
SQUAMOUS # UR AUTO: SIGNIFICANT CHANGE UP
UROBILINOGEN FLD QL: NORMAL — SIGNIFICANT CHANGE UP
WBC # BLD: 19.05 K/UL — HIGH (ref 3.8–10.5)
WBC # BLD: 25.77 K/UL — HIGH (ref 3.8–10.5)
WBC # FLD AUTO: 19.05 K/UL — HIGH (ref 3.8–10.5)
WBC # FLD AUTO: 25.77 K/UL — HIGH (ref 3.8–10.5)
WBC UR QL: HIGH (ref 0–?)

## 2019-08-18 PROCEDURE — 31500 INSERT EMERGENCY AIRWAY: CPT | Mod: 59

## 2019-08-18 PROCEDURE — 76937 US GUIDE VASCULAR ACCESS: CPT | Mod: 26

## 2019-08-18 PROCEDURE — 71045 X-RAY EXAM CHEST 1 VIEW: CPT | Mod: 26

## 2019-08-18 PROCEDURE — 36600 WITHDRAWAL OF ARTERIAL BLOOD: CPT

## 2019-08-18 PROCEDURE — 99291 CRITICAL CARE FIRST HOUR: CPT | Mod: 25

## 2019-08-18 RX ORDER — VANCOMYCIN HCL 1 G
1000 VIAL (EA) INTRAVENOUS EVERY 12 HOURS
Refills: 0 | Status: DISCONTINUED | OUTPATIENT
Start: 2019-08-18 | End: 2019-08-19

## 2019-08-18 RX ORDER — LEVETIRACETAM 250 MG/1
1500 TABLET, FILM COATED ORAL ONCE
Refills: 0 | Status: COMPLETED | OUTPATIENT
Start: 2019-08-18 | End: 2019-08-18

## 2019-08-18 RX ORDER — VALPROIC ACID (AS SODIUM SALT) 250 MG/5ML
1000 SOLUTION, ORAL ORAL EVERY 8 HOURS
Refills: 0 | Status: DISCONTINUED | OUTPATIENT
Start: 2019-08-18 | End: 2019-08-22

## 2019-08-18 RX ORDER — CEFEPIME 1 G/1
2000 INJECTION, POWDER, FOR SOLUTION INTRAMUSCULAR; INTRAVENOUS EVERY 8 HOURS
Refills: 0 | Status: DISCONTINUED | OUTPATIENT
Start: 2019-08-18 | End: 2019-08-19

## 2019-08-18 RX ORDER — SODIUM CHLORIDE 9 MG/ML
1000 INJECTION INTRAMUSCULAR; INTRAVENOUS; SUBCUTANEOUS ONCE
Refills: 0 | Status: COMPLETED | OUTPATIENT
Start: 2019-08-18 | End: 2019-08-18

## 2019-08-18 RX ORDER — HYDROCORTISONE 20 MG
100 TABLET ORAL ONCE
Refills: 0 | Status: COMPLETED | OUTPATIENT
Start: 2019-08-18 | End: 2019-08-18

## 2019-08-18 RX ORDER — LEVETIRACETAM 250 MG/1
1000 TABLET, FILM COATED ORAL EVERY 12 HOURS
Refills: 0 | Status: DISCONTINUED | OUTPATIENT
Start: 2019-08-19 | End: 2019-08-19

## 2019-08-18 RX ADMIN — SODIUM CHLORIDE 1000 MILLILITER(S): 9 INJECTION INTRAMUSCULAR; INTRAVENOUS; SUBCUTANEOUS at 19:00

## 2019-08-18 RX ADMIN — Medication 250 MILLIGRAM(S): at 22:51

## 2019-08-18 RX ADMIN — Medication 1 MILLIGRAM(S): at 18:37

## 2019-08-18 RX ADMIN — Medication 30 MILLIGRAM(S): at 23:22

## 2019-08-18 RX ADMIN — LEVETIRACETAM 400 MILLIGRAM(S): 250 TABLET, FILM COATED ORAL at 18:47

## 2019-08-18 RX ADMIN — SODIUM CHLORIDE 1000 MILLILITER(S): 9 INJECTION INTRAMUSCULAR; INTRAVENOUS; SUBCUTANEOUS at 17:30

## 2019-08-18 RX ADMIN — Medication 100 MILLIGRAM(S): at 20:50

## 2019-08-18 RX ADMIN — Medication 2 MILLIGRAM(S): at 19:34

## 2019-08-18 RX ADMIN — SODIUM CHLORIDE 1000 MILLILITER(S): 9 INJECTION INTRAMUSCULAR; INTRAVENOUS; SUBCUTANEOUS at 19:34

## 2019-08-18 NOTE — PATIENT PROFILE ADULT - PSYCHOSOCIAL CONCERNS - OTHER
Pt comes from group home. Group home representative answered questions on patient's behalf due to pt being non verbal.

## 2019-08-18 NOTE — H&P ADULT - ASSESSMENT
Ms. Ponce is a 54F with a PMH of cerebral palsy and seizure disorder presenting from group home for acute AMS and increased work of breathing with recent seizure yesterday found to be hypothermic, hypotensive and bradycardic with leukocytosis and imaging showing new right lung opacification concerning for septic shock 2/2 to aspiration PNA. Admitted to MICU for further management.    NEURO: Patient has a history of cerebral palsy and seizure disorder. Baseline mental status minimally verbal but interactive/alert and withdraws to pain. Neurology on board. Lethargic currently 2/2 to metabolic encephalopathy due to infection.  - Will treat for infection as detailed below to improve mental status   - Neurology recs appreciated  - CTH w/o contrast pending   - Depakote 1g TID and continue Keppra 1g BID (received a dose 1500mg at ~7PM) next dose at 7AM tomorrow   - Ativan 2mg PRN for seizures > 3 minutes or GTC  - Check depakote level in the AM     CARDIOVASCULAR: No history of cardiac disease. However found to be hypotensive and bradycardic in the setting of sepsis. Pressor support improved SBPs and bradycardia.  - On levophed gtt 1.5 will titrate with goal MAP > 65  - Repeat lactate (5.9 -> 3.7)  - Will hold off on mIVF for now given 3L of NS in the ED    PULMONARY: Hypothermic with lung imaging concerning for new right lung opacification. On POCUS see signs of consolidation likely aspiration PNA due to seizure yesterday.  - Protecting airway on NC will wean off supplemental O2 as tolerated  - Treat for aspiration PNA with Vancomycin 1000mg BID (MRSA coverage for recent hospitalizations) and Cefepime 2000mg Q8H   - Aspiration precautions     GI: Patient has a history of enteritis in the past but no GI symptoms currently. Eats pureed foods.  - Keep NPO due to concern for aspiration may need a formal speech and swallow evaluation  - Aspiration precautions     RENAL:   - Will monitor UOP if no urination by 11PM will place a Cazares  - Mildly hypokalemic on admission labs repeat labs are pending will replete electrolytes PRN    INFECTIOUS DISEASE: New right lung opacification with hypothermia and leukocytosis meeting SIRS criteria with presumable source aspiration PNA complicated by hypotension 2/2 to septic shock. UA negative.   - As mentioned above Cefepime and Vancomycin for now will descalate as clinically improves and blood cultures results return  - May consider sputum cultures but may be difficult to obtain   - If patient remains on high doses of pressor support will consider CT chest/abdomen/pelvis to rule out occult source of infection given also has a recent history of enteritis requiring MICU admission  - Vanc level pre-4th dose  - Follow up blood cultures  - CBCs with diff daily     HEMATOLOGY: has thrombocytopenia but has had thrombocytopenia in the past could be 2/2 to infection  - CBCs with diff daily    ENDOCRINE:  - No active issues    SKIN:  - No active issues    DVT PPx:  - SCDs once weight and height obtained HSQ Q8H    Code Status: Full Ms. Ponce is a 54F with a PMH of cerebral palsy and seizure disorder presenting from [facility] home for acute AMS and increased work of breathing with recent seizure yesterday found to be hypothermic, hypotensive and bradycardic with leukocytosis and imaging showing new right lung opacification concerning for septic shock 2/2 to aspiration PNA. Admitted to MICU for further management.    NEURO: Patient has a history of cerebral palsy and seizure disorder. Baseline mental status minimally verbal but interactive/alert and withdraws to pain. Neurology on board. Lethargic currently 2/2 to metabolic encephalopathy due to infection.  - Intubated due to poor mental status s/p seizures and hypercarbic respiratory failure  - Will treat for infection as detailed below to improve mental status   - Neurology recs appreciated  - CTH w/o contrast pending   - Depakote 1g TID and continue Keppra 1g BID (received a dose 1500mg at ~7PM) next dose at 7AM tomorrow   - On propofol for sedation while intubated and seizure management  - Ativan 2mg PRN for seizures > 3 minutes or GTC when off sedation  - Check depakote level in the AM     CARDIOVASCULAR: No history of cardiac disease. However found to be hypotensive and bradycardic in the setting of sepsis. Pressor support improved SBPs and bradycardia.  - On levophed gtt 1.5 will titrate with goal MAP > 65  - Repeat lactate (5.9 -> 3.7)  - Will hold off on mIVF for now given 3L of NS in the ED    PULMONARY: Hypothermic with lung imaging concerning for new right lung opacification. On POCUS see signs of consolidation likely aspiration PNA due to seizure yesterday.  - S/p intubation in MICU for poor mental status and inability to maintain airway, as well as hypercarbic respiratory failure  - Treat for aspiration PNA with Vancomycin (MRSA coverage for recent hospitalizations) and Zosyn until sputum cultures return  - Aspiration precautions     GI: Patient has a history of enteritis in the past but no GI symptoms currently. Eats pureed foods.  - Keep NPO  - OGT in place, consider feeds if remains intubated  - Aspiration precautions     RENAL:   - Cazares in place with clear urine output  - Replete electrolytes PRN    INFECTIOUS DISEASE: New right lung opacification with hypothermia and leukocytosis meeting SIRS criteria with presumable source aspiration PNA complicated by hypotension 2/2 to septic shock. UA negative.   - As mentioned above Zosyn and Vancomycin for now will de-escalate as clinically improves and blood cultures results return  - Obtain sputum cultures with suctioning of ETT  - If patient remains on high doses of pressor support will consider CT chest/abdomen/pelvis to rule out occult source of infection given also has a recent history of enteritis requiring MICU admission  - Vanc level pre-4th dose  - Follow up blood cultures  - CBCs with diff daily     HEMATOLOGY: has thrombocytopenia but has had thrombocytopenia in the past could be 2/2 to infection  - CBCs with diff daily    ENDOCRINE:  - No active issues    SKIN:  - No active issues    DVT PPx:  - SCDs once weight and height obtained HSQ Q8H    Code Status: Full Ms. Ponce is a 54F with a PMH of cerebral palsy and seizure disorder presenting from [facility] home for acute AMS and increased work of breathing with recent seizure yesterday found to be hypothermic, hypotensive and bradycardic with leukocytosis and imaging showing new right lung opacification concerning for septic shock 2/2 to aspiration PNA. Admitted to MICU for further management.    NEURO: Patient has a history of cerebral palsy and seizure disorder. Baseline mental status minimally verbal but interactive/alert and withdraws to pain. Neurology on board. Lethargic currently 2/2 to metabolic encephalopathy due to infection.  - Intubated due to poor mental status s/p seizures concern for possible status epilepticus and hypercarbic respiratory failure  - Will treat for infection as detailed below to improve mental status   - Neurology recs appreciated  - CTH w/o contrast pending   - Depakote 1g TID and continue Keppra 1g BID (received a dose 1500mg at ~7PM) next dose at 7AM tomorrow   - On propofol for sedation while intubated and seizure management titrate to RAAS -2 to -5  - Ativan 2mg PRN for seizures > 3 minutes or GTC when off sedation  - Check depakote level in the AM     CARDIOVASCULAR: No history of cardiac disease. However found to be hypotensive and bradycardic in the setting of sepsis. Pressor support improved SBPs and bradycardia.  - On levophed gtt 1.5 will titrate with goal MAP > 65  - Repeat lactate (5.9 -> 3.7)  - Will hold off on mIVF for now given 3L of NS in the ED    PULMONARY: Hypothermic with lung imaging concerning for new right lung opacification. On POCUS see signs of consolidation likely aspiration PNA due to seizure yesterday.  - S/p intubation in MICU for poor mental status and inability to maintain airway, as well as hypercarbic respiratory failure pCO2s 50s to 70s  - When plan to extubate will need steroids to minimize vocal cord edema  - Treat for aspiration PNA with Vancomycin (MRSA coverage for recent hospitalizations) and Zosyn until sputum cultures return  - Aspiration precautions     GI: Patient has a history of enteritis in the past but no GI symptoms currently. Eats pureed foods.  - Keep NPO  - OGT in place, consider feeds if remains intubated  - Aspiration precautions     RENAL:   - Cazares in place with clear urine output  - Replete electrolytes PRN    INFECTIOUS DISEASE: New right lung opacification with hypothermia and leukocytosis meeting SIRS criteria with presumable source aspiration PNA complicated by hypotension 2/2 to septic shock. UA negative.   - As mentioned above Zosyn and Vancomycin for now will de-escalate as clinically improves and blood cultures results return  - Obtain sputum cultures with suctioning of ETT  - If patient remains on high doses of pressor support will consider CT chest/abdomen/pelvis to rule out occult source of infection given also has a recent history of enteritis requiring MICU admission  - Vanc level pre-4th dose  - Follow up blood cultures  - CBCs with diff daily     HEMATOLOGY: has thrombocytopenia but has had thrombocytopenia in the past could be 2/2 to infection  - CBCs with diff daily    ENDOCRINE:  - No active issues    SKIN:  - Has 2+ pitting edema of the feet likely due to immobility  - Few blanching pressure wounds on back of feet and sacral area     DVT PPx:  - HSQ Q8H    Code Status: Full Ms. Ponce is a 54F with a PMH of cerebral palsy and seizure disorder presenting from group home for acute AMS and increased work of breathing with recent seizure yesterday found to be hypothermic, hypotensive and bradycardic with leukocytosis and imaging showing new right lung opacification concerning for septic shock 2/2 to aspiration PNA. Admitted to MICU for further management.    NEURO: Patient has a history of cerebral palsy and seizure disorder. Baseline mental status minimally verbal but interactive/alert and withdraws to pain. Neurology on board. Lethargic currently 2/2 to metabolic encephalopathy due to infection.  - Intubated due to poor mental status s/p seizures concern for possible status epilepticus and hypercarbic respiratory failure  - Will treat for infection as detailed below to improve mental status   - Neurology recs appreciated  - CTH w/o contrast pending   - Depakote 1g TID and continue Keppra 1g BID (received a dose 1500mg at ~7PM) next dose at 7AM tomorrow   - On propofol for sedation while intubated and seizure management titrate to RAAS -2 to -5  - Ativan 2mg PRN for seizures > 3 minutes or GTC when off sedation  - Check depakote level in the AM   - On Zyprexa 7.5 mg QD holding for now as patient is sedated    CARDIOVASCULAR: No history of cardiac disease. However found to be hypotensive and bradycardic in the setting of sepsis. Pressor support improved SBPs and bradycardia.  - On levophed gtt 1.5 will titrate with goal MAP > 65  - Repeat lactate (5.9 -> 3.7)  - Will hold off on mIVF for now given 3L of NS in the ED    PULMONARY: Hypothermic with lung imaging concerning for new right lung opacification. On POCUS see signs of consolidation likely aspiration PNA due to seizure yesterday.  - S/p intubation in MICU for poor mental status and inability to maintain airway, as well as hypercarbic respiratory failure pCO2s 50s to 70s  - Vent settings  RR 16 FiO2 50% PEEP 5  - When plan to extubate will need steroids to minimize vocal cord edema  - Treat for aspiration PNA with Vancomycin (MRSA coverage for recent hospitalizations) and Zosyn until sputum cultures return  - Aspiration precautions     GI: Patient has a history of enteritis in the past but no GI symptoms currently. Eats pureed foods.  - Keep NPO  - OGT in place, consider feeds if remains intubated  - Aspiration precautions     RENAL:   - Cazares in place with clear urine output  - Replete electrolytes PRN    INFECTIOUS DISEASE: New right lung opacification with hypothermia and leukocytosis meeting SIRS criteria with presumable source aspiration PNA complicated by hypotension 2/2 to septic shock. UA negative.   - As mentioned above Zosyn and Vancomycin for now will de-escalate as clinically improves and blood cultures results return  - Obtain sputum cultures with suctioning of ETT  - If patient remains on high doses of pressor support will consider CT chest/abdomen/pelvis to rule out occult source of infection given also has a recent history of enteritis requiring MICU admission  - Vanc level pre-4th dose  - Follow up blood cultures  - CBCs with diff daily     HEMATOLOGY: has thrombocytopenia but has had thrombocytopenia in the past could be 2/2 to infection  - CBCs with diff daily    ENDOCRINE:  - No active issues    SKIN:  - Has 2+ pitting edema of the feet likely due to immobility  - Few blanching pressure wounds on back of feet and sacral area     DVT PPx:  - HSQ Q8H    Code Status: Full

## 2019-08-18 NOTE — ED PROVIDER NOTE - PROGRESS NOTE DETAILS
NEFTALY Pugh: pt with 10 second seizure, resolved spontaneously. Likely breakthrough seizure, ordered for Keppra 1500mg (pt takes 1000mg TID) Kwon: pt episode of bradycardia and hypotension.  receving 3rd liter.  started on levophed 0.05 and now at 0.1.  Bp 95.  hr 60-70.  pt + gag reflex,  spoke with family and wants pt to be full code.  bands 21.  ICu called and will see pt.  pt in septic shock requiring pressors.  at this time no need to intubated.  pt no longer with seizure after total of 3 IV ativan and with gag reflex.  pt also in no resp distress.  Will monitor closely. Spoke with supervisor for her care Alina at [phone] who directed me to call Marine Ponce (patient's mother and health care proxy) at [phone] which went to voicemail at first but eventually was able to get a hold of her who states patient is full code. Spoke to Dr. Michel of neurology, states Dr. Sanchez says to change patient's Valproic Acid to 1000mg q 8 hours. Send Valproic Acid level in the morning. Keep patient on home Keppra dose.

## 2019-08-18 NOTE — H&P ADULT - HISTORY OF PRESENT ILLNESS
Ms. Ponce is a 54F with a PMH of cerebral palsy and seizure disorder presenting from group home for AMS and increased work of breathing. Patient is minimally verbal at baseline so unable to give history. Obtained history from aide at bedside and chart review. She reports that since yesterday patient has had decreased alertness and "breathing has been different". At baseline patient is interactive and may say a few words. She can also withdraw from her pain and eats pureed foods. Reportedly had a seizure episode yesterday and since then has not been herself. No fevers/chills, cough, nausea/vomiting or diarrhea.    Of note patient has had multiple hospitalizations in the past year recently between April 23rd to April 30th. At that time presented due to multiple seizures concerning for status epilepticus. Patient was hypotensive, hypothermic and bradycardic in the setting of septic shock and admitted to MICU for pressor support. Her infectious work up was positive for enteritis on CT A/P and was given Cipro/Flagyl for 7 days and then weaned off pressors and transitioned to the floors. During that hospitalization her AEDs were optimized with an increase in depakote to 750mg TID (1 pill of 250 and 1 pill of 500) and ativan was discontinued. Has had multiple UTIs in the past 2/2 to E coli (pansensitive).    In the ED 96F, /66 HR 98 RR 18 SpO2 of 98% on RA. Labs notable for leukocytosis 18 (neutrophilic predominant), thrombocytopenia to 112, pH 7.26 pCO2 75 pO2 31 with a lactate of 5.9 (repeat 3.7). Neurology consulted. CXR shows right lung opacity concerning for PNA. Received levaquin x 1, 3L of NS, Solucortef 100mg x 1, Keppra 1500mg x 1, Ativan 1mg and 2mg x 1 each. Patient continued to be hypothermic, bradycardic to 50s and hypotensive to the 60s and started on levophed gtt. Transferred to MICU for further management. Ms. Ponce is a 54F with a PMH of cerebral palsy and seizure disorder presenting from group home for AMS and increased work of breathing. Patient is minimally verbal at baseline so unable to give history. Obtained history from aide at bedside and chart review. She reports that since yesterday patient has had decreased alertness and "breathing has been different". At baseline patient is interactive and may say a few words. She can also withdraw from her pain and eats pureed foods. Reportedly had a seizure episode yesterday and since then has not been herself. No fevers/chills, cough, nausea/vomiting or diarrhea.    Of note patient has had multiple hospitalizations in the past year recently between April 23rd to April 30th. At that time presented due to multiple seizures concerning for status epilepticus. Patient was hypotensive, hypothermic and bradycardic in the setting of septic shock and admitted to MICU for pressor support. Her infectious work up was positive for enteritis on CT A/P and was given Cipro/Flagyl for 7 days and then weaned off pressors and transitioned to the floors. During that hospitalization her AEDs were optimized with an increase in depakote to 750mg TID (1 pill of 250 and 1 pill of 500) and ativan was discontinued. Has had multiple UTIs in the past 2/2 to E coli (pansensitive).    In the ED 96F, /66 HR 98 RR 18 SpO2 of 98% on RA. Labs notable for leukocytosis 18 (neutrophilic predominant), thrombocytopenia to 112, pH 7.26 pCO2 75 pO2 31 with a lactate of 5.9 (repeat 3.7). Neurology consulted. CXR shows right lung opacity concerning for PNA. Had 5-6 seizures that were GTC that lasted ~30 seconds. Received levaquin x 1, 3L of NS, Solucortef 100mg x 1, Keppra 1500mg x 1, Ativan 1mg and 2mg x 1 each. Patient continued to be hypothermic, bradycardic to 50s and hypotensive to the 60s and started on levophed gtt. Transferred to MICU for further management.

## 2019-08-18 NOTE — CONSULT NOTE ADULT - ASSESSMENT
INCOMPLETE      Assessment:  54y y/o ***-handed F with PMH of *** presenting with  likely due to ***    DDx include:     Plan  -    Assessment and plan discussed/not discussed with the attending, Dr. Vic Michel, DO  PGY-2 Neurology Service Assessment:  54y White F with PMH of seizure disorder, CP, intellectual disability who presents with break through seizures and pneumonia.  Likely her breakthrough seizures are due to combination of current infection, recent AED change, Lithium toxicity.  Was hemodynamically unstable on initial evaluation with septic shock.  Further records on [facility] stay with her status epilepticus admission needed-request records.     Plan  -Keep prior home dose of Keppra 1000 q8h  -Change valproic acid daily dose to 1000 mg q8h   -Send valproic acid level in AM.   -Correct electrolyte and infectious derangements.   -Low threshold for intubation as patient was hemodynamically unstable and protecting her airway at the time, but is at high risk of needing prophylactic airway protection.  -Ativan 2mg IVP for seizure lasting >3 minutes or GTC.     Assessment and plan discussed with the attending, Dr. Laura Michel, DO  PGY-2 Neurology Service

## 2019-08-18 NOTE — ED ADULT NURSE REASSESSMENT NOTE - NS ED NURSE REASSESS COMMENT FT1
Pt received from [facility]. Pt responsive to only noxious stimuli(+gag reflex) Pt on levophed, requiring upwards titration to 0.15mcg.kg.min due to BP in 60s. Pt on Bear Hugger low. 3L NC, CO2 monitoring. Pt w episodes of bradycardia. Moved pt to rm 23 for closer monitoring while awaiting MICU consult. Education provided to staff at bedside.
ICU nurse Corine is at bedside, patient is on levo drip, BP is improving at this time, Packer Hugger is placed on patient. Patient has two IV access on right arm, and one IV access on left arm. Will follow up.

## 2019-08-18 NOTE — ED PROVIDER NOTE - PRINCIPAL DIAGNOSIS
Pneumonia due to infectious organism, unspecified laterality, unspecified part of lung Septic shock due to Pseudomonas species

## 2019-08-18 NOTE — ED PROVIDER NOTE - OBJECTIVE STATEMENT
54yF w/pmhx cerebral palsy, seizure disorder on Depakote and keppra presenting BIBEMS from group home for change to breathing and decreased alertness. Pt aide at bedside states they noticed she seemed to be "breathing different" today and was interactive than normal. Reports patient had a seizure yesterday. Pt is minimally verbal at baseline, unable to provide history. Aide denies fever/chills, vomiting, diarrhea

## 2019-08-18 NOTE — ED ADULT NURSE NOTE - OBJECTIVE STATEMENT
Pt received awake and alert, minimally verbal, unable to appropriately answer questions which is baseline as per staff w pt at bedside, as per staff pt "not acting herself", less talkative, as per staff she has been off, unknown period of time of pts illness, pt vs as reported, MD at bedside performing eval and IVUS, pt appears to be in NAD, will continue to monitor.

## 2019-08-18 NOTE — ED PROVIDER NOTE - ATTENDING CONTRIBUTION TO CARE
I was physically present for the E/M service provided. I agree with above history, physical, and plan which I have reviewed and edited where appropriate. I was physically present for the key portions of the service provided.    Kwon: 54yF w/pmhx cerebral palsy, seizure disorder on Depakote and keppra presenting BIBEMS from group home for change to breathing and decreased alertness. Pt aide at bedside states they noticed she seemed to be "breathing different" today and was less interactive than normal. Reports patient had a seizure yesterday but return to baseline. pt verbal at baseline    *GEN:   comfortable, in no apparent distress, Alert confused  *EYES:   PERRL, extra-occular movements intact  *HEENT:   poor dention  *CV:   regular rate and rhythm, normal S1/S2, no murmur  *RESP:   clear to auscultation bilaterally, non-labored, speaking in full sentences  *ABD:   soft, non tender, no guarding  *MSK:   no musculoskeletal tenderness, 5/5 strength, moving all extremity  *SKIN:   dry, intact, no rash  *NEURO:   Alert,     a/p: irregular breathing patter and apparent hypotension at home with a seizure yesterday now at baseline- possibly breakthrough seizure r/o uti vs aspiration pna vs lytes imbalance.  if work up neg dc.  pt bp no longer hypotensive without any tx.  will also check levels.

## 2019-08-18 NOTE — ED ADULT TRIAGE NOTE - CHIEF COMPLAINT QUOTE
BIB EMS from group home history of seizures and CP brought in for not acting herself   aide with pt from group home

## 2019-08-18 NOTE — H&P ADULT - NSHPLABSRESULTS_GEN_ALL_CORE
11.0   25.77 )-----------( 127      ( 18 Aug 2019 22:00 )             36.7           144  |  108<H>  |  14  ----------------------------<  120<H>  3.5   |  24  |  0.44<L>    Ca    8.2<L>      18 Aug 2019 22:00  Phos  3.1       Mg     1.3         TPro  6.3  /  Alb  2.8<L>  /  TBili  0.2  /  DBili  x   /  AST  22  /  ALT  18  /  AlkPhos  61            ABG - ( 18 Aug 2019 22:10 )  pH, Arterial: 7.30  pH, Blood: x     /  pCO2: 55    /  pO2: 116   / HCO3: 24    / Base Excess: 0.3   /  SaO2: 98.0                Urinalysis Basic - ( 18 Aug 2019 17:31 )    Color: YELLOW / Appearance: CLEAR / S.030 / pH: 5.5  Gluc: 500 / Ketone: SMALL  / Bili: NEGATIVE / Urobili: NORMAL   Blood: NEGATIVE / Protein: 50 / Nitrite: NEGATIVE   Leuk Esterase: NEGATIVE / RBC: 0-2 / WBC 6-10   Sq Epi: OCC / Non Sq Epi: x / Bacteria: FEW        PT/INR - ( 18 Aug 2019 22:00 )   PT: 11.9 SEC;   INR: 1.04          PTT - ( 18 Aug 2019 22:00 )  PTT:35.1 SEC    Lactate Trend   @ 22:10 Lactate:1.1         CAPILLARY BLOOD GLUCOSE      POCT Blood Glucose.: 139 mg/dL (18 Aug 2019 18:29)      Cultures:    Radiology:    EKG:

## 2019-08-18 NOTE — H&P ADULT - NSHPPHYSICALEXAM_GEN_ALL_CORE
PHYSICAL EXAM:  GENERAL: NAD, lethargic on 2L of NC  HEAD: Atraumatic, Normocephalic  EYES:  PERRLA, conjunctiva and sclera clear  ENMT: No tonsillar erythema, exudates, or enlargement; Moist mucous membranes. Poor dentition.  NECK: Supple  NERVOUS SYSTEM: Responsive to verbal stimuli. Withdraws from pain. HOWELL x 4.  CHEST/LUNG: Decreased breath sounds bilaterally, poor inspiratory effort  HEART: Regular rate and rhythm; S1 and S2; No murmurs  ABDOMEN: Soft, Nontender, Nondistended: Bowel sounds present  EXTREMITIES: 2+ Peripheral Pulses, No clubbing, cyanosis, or edema  SKIN: No rashes or lesions PHYSICAL EXAM:  GENERAL: NAD, lethargic on 2L of NC  HEAD: Atraumatic, Normocephalic  EYES:  PERRLA, conjunctiva and sclera clear  ENMT: No tonsillar erythema, exudates, or enlargement; Moist mucous membranes. Poor dentition.  NECK: Supple  NERVOUS SYSTEM: Responsive to verbal stimuli. Withdraws from pain. HOWELL x 4.  CHEST/LUNG: Decreased breath sounds bilaterally, poor inspiratory effort  HEART: Regular rate and rhythm; S1 and S2; No murmurs  ABDOMEN: Soft, Nontender, Nondistended: Bowel sounds present  EXTREMITIES: 2+ Peripheral Pulses. 2+ pitting edema of the feet   SKIN: No rashes or lesions

## 2019-08-18 NOTE — ED PROVIDER NOTE - CRITICAL CARE INDICATION, MLM
patient was critically ill... Due to the presence of hypotension, seizures and the risk of sudden rapid deterioration due to my attendance to this patient required critical care time of approx 100 minutes including assessment/reassessment, documentation, ordering and interpreting ancillary studies, discussion with ED staff and consultants, patient and their family, and excludes time spent in teaching of Residents and time spent on separately billable procedures.    require fluid resus, levophed, airway monitoring

## 2019-08-18 NOTE — ED PROVIDER NOTE - CARE PLAN
Principal Discharge DX:	Pneumonia due to infectious organism, unspecified laterality, unspecified part of lung  Secondary Diagnosis:	Seizure Principal Discharge DX:	Septic shock due to Pseudomonas species  Secondary Diagnosis:	Seizure

## 2019-08-18 NOTE — ED ADULT NURSE REASSESSMENT NOTE - NSIMPLEMENTINTERV_GEN_ALL_ED
Implemented All Fall Risk Interventions:  [place] to call system. Call bell, personal items and telephone within reach. Instruct patient to call for assistance. Room bathroom lighting operational. Non-slip footwear when patient is off stretcher. Physically safe environment: no spills, clutter or unnecessary equipment. Stretcher in lowest position, wheels locked, appropriate side rails in place. Provide visual cue, wrist band, yellow gown, etc. Monitor gait and stability. Monitor for mental status changes and reorient to person, place, and time. Review medications for side effects contributing to fall risk. Reinforce activity limits and safety measures with patient and family.

## 2019-08-18 NOTE — CHART NOTE - NSCHARTNOTEFT_GEN_A_CORE
Critically ill patient requiring ABG to determine respiratory status.  This was an emergent procedure.  Patients radial artery was palpated/ visualized with US and cleaned with alcohol pad.   23g x 3/4" x 12" butterfly needed was inserted into the left/right radial artery with pulsatile flash.  One attempt was performed.  3cc of blood was obtained from patient.  Gauze pad was placed over site, needle withdrawn and firm pressure was held until complete hemostasis was achieved and band-aid was applied.  Patient tolerated procedure well with no complications.        [facility] RPA C [id number]

## 2019-08-18 NOTE — ED ADULT NURSE NOTE - ED STAT RN HANDOFF DETAILS
Patient is moved to MICU 9, will go for CT scan prior to go to unit. Caregiver is also at the bedside

## 2019-08-18 NOTE — H&P ADULT - NSHPSOCIALHISTORY_GEN_ALL_CORE
Lives in a group home. Interactive and minimally verbal at baseline. Eats pureed foods. Able to understand when spoken to.

## 2019-08-18 NOTE — H&P ADULT - ATTENDING COMMENTS
54F with a PMH of cerebral palsy and seizure disorder presenting from [facility] home for acute AMS and increased work of breathing with recent seizure yesterday found to be hypothermic, hypotensive and bradycardic with leukocytosis and imaging showing new right lung opacification concerning for septic shock 2/2 to aspiration PNA. Admitted to MICU for further management.  She was noted to have an acute deterioration since admission to MICU with 5+ seizures in 1 hour, and post ictal state with respiratory compromise. THough she started to recover from post ictal state somewhat,  she was intubated for burst suppression with propofol given seizure frequency and concern for status.  Of note during the intubation due to difficult airway anatomy there was some tyraum to her Right vocal chord and she will need steroids prior to extubation.     Otherwise plan was discussed with the resident team and I agree with assessment and plan as written above.

## 2019-08-18 NOTE — CONSULT NOTE ADULT - SUBJECTIVE AND OBJECTIVE BOX
NEUROLOGY CONSULT   HPI: NAYANA JACQUES is a 54y R-handed woman with a PMH of intellectual disability, CP and seizures who presented on 19 with breakthrough seizures    In the ED she had 1 mg ativan then followed by 3 mg ativan.     ROS: A 10-system ROS was performed and is negative except for those items noted above.     PMH:  Intellectual disability  Constipation  Seizure disorder  Cerebral palsy      Home Medications:  divalproex sodium 500 mg oral delayed release tablet: 1 tab(s) orally every 8 hours   · 	divalproex sodium 250 mg oral delayed release tablet: 1 tab(s) orally every 8 hours   · 	levETIRAcetam 1000 mg oral tablet: 1 tab(s) orally 3 times a day  6am ,2pm, 10pm   · 	folic acid 1 mg oral tablet: 1 tab(s) orally once a day      MEDICATIONS  (STANDING):  hydrocortisone sodium succinate Injectable 100 milliGRAM(s) IV Push once  Keppra 1000 BID   once daily and 250 once dailly.       MEDICATIONS  (PRN):      ALLERGIES: Topamax (Unknown)      PSH:   No significant past surgical history      Social History:    FH:  No pertinent family history in first degree relatives  No pertinent family history in first degree relatives      OBJECTIVE  Vital Signs Last 24 Hrs  T(C): 34.8 (18 Aug 2019 19:32), Max: 35.9 (18 Aug 2019 17:06)  T(F): 94.7 (18 Aug 2019 19:32), Max: 96.7 (18 Aug 2019 17:06)  HR: 68 (18 Aug 2019 20:12) (55 - 98)  BP: 102/50 (18 Aug 2019 20:12) (66/28 - 114/66)  BP(mean): --  RR: 16 (18 Aug 2019 20:12) (16 - 18)  SpO2: 100% (18 Aug 2019 20:12) (98% - 100%)  I&O's Summary      PHYSICAL EXAM:  General: female appears stated age, in NAD  Cardiac: S1, S2 normal. RRR with regular pulse.  No murmurs, rubs or gallops.  Respiratory: CTA throughout with good air entry.  MSK: No erythema, tenderness or deformities.   Skin: No rashes, lesions or color changes.  Neurologic:  Mental Status: Awake, alert, oriented to person, place, situation, time. Normal affect. Follows all commands.    Language: Speech is clear, fluent with preserved naming, repetition and comprehension.      Cranial Nerves: PERRLA (R = 3mm, L = 3mm). Visual fields intact. EOMI no nystagmus. V1-3 intact to light touch.  No facial asymmetry b/l, full eye closure strength b/l. Hearing grossly normal to conversation.  Symmetric palate elevation in midline.  Head turning & shoulder shrug intact b/l. Tongue midline, normal movements, no atrophy.  Motor: Normal muscle bulk & tone. No noticeable tremor, myoclonus or pronator drift. ***/5 strength.	  Sensation: Symmetric B/L preserved sensation to light touch, pin prick, position.    Cortical: No extinction on double simultaneous touch and no signs of neglect.   Reflexes: ***/4.  Plantar Responses are ***.   Coordination: Intact rapid-alternating movements. No dysmetria on finger-to-nose or heel-to-shin.  No dysdiadodyskinesia  Gait: Normal stance, stride length, touch off, arm swing and bharti.   Normal Romberg. No postural instability.     Psychiatric: Normal mood and congruent affect.     CBC:                        12.4   19.05 )-----------( 112      ( 18 Aug 2019 17:26 )             40.7       CMP:      145  |  103  |  18  ----------------------------<  161<H>  3.4<L>   |  27  |  0.57    Ca    9.8      18 Aug 2019 17:26    TPro  7.3  /  Alb  3.2<L>  /  TBili  0.2  /  DBili  x   /  AST  31  /  ALT  20  /  AlkPhos  68      LIVER FUNCTIONS - ( 18 Aug 2019 17:26 )  Alb: 3.2 g/dL / Pro: 7.3 g/dL / ALK PHOS: 68 u/L / ALT: 20 u/L / AST: 31 u/L / GGT: x             COAGS:      UA:  Urinalysis Basic - ( 18 Aug 2019 17:31 )    Color: YELLOW / Appearance: CLEAR / S.030 / pH: 5.5  Gluc: 500 / Ketone: SMALL  / Bili: NEGATIVE / Urobili: NORMAL   Blood: NEGATIVE / Protein: 50 / Nitrite: NEGATIVE   Leuk Esterase: NEGATIVE / RBC: 0-2 / WBC 6-10   Sq Epi: OCC / Non Sq Epi: x / Bacteria: FEW        Cardiac:        ABG:      MICRO/CULTURES:        Neuro LABS    CSF Results:       Imaging/Studies: NEUROLOGY CONSULT   HPI: Mrs. NAYANA JACQUES is a 54y R-handed woman with a PMH of intellectual disability, CP and seizures who presented on 19 with breakthrough seizures and pneumonia. She was described as having worsening SOB and difficulty breathing over the past several days.  Aide at bed side notes her seizures are B/L arm shaking for 10-60 seconds at a time with post-ictal confusion.  She was recently admitted to [facility] last week for breakthrough seizures, though the cause is not clear.  She had been doing well until yesterday night when she had a seizure last night lasting 1 minute.  Today, she had two seizures lasting 60 seconds about 40 minutes apart in the ED.  She was given 1 mg of ativan, followed by 2 mg of ativan for second seizure.  The aide notes she has not missed any of her home AEDs and is unsure of there were any changes in her AED regiment from the past hospitalization.  Her baseline mental status is mostly non-verbal, but she knows who she is, where she is and who is interacting with her.      ROS: A 10-system ROS was performed and is negative except for those items noted above.     PMH:  Intellectual disability  Constipation  Seizure disorder  Cerebral palsy    Home Anti-epileptic Medications:  -divalproex sodium 500 mg oral delayed release tablet: 1 tab(s) orally every 8 hours   -divalproex sodium 250 mg oral delayed release tablet: 1 tab(s) orally every 8 hours   -levETIRAcetam 1000 mg oral tablet: 1 tab(s) orally 3 times a day  6am ,2pm, 10pm   -folic acid 1 mg oral tablet: 1 tab(s) orally once a day    MEDICATIONS  (STANDING):  hydrocortisone sodium succinate Injectable 100 milliGRAM(s) IV Push once  Keppra 1000 BID   once daily and 250 once daily     ALLERGIES: Topamax (Unknown)    PSH: No significant past surgical history    Social History: Lives in group home.     OBJECTIVE  Vital Signs Last 24 Hrs  T(C): 34.8 (18 Aug 2019 19:32), Max: 35.9 (18 Aug 2019 17:06)  T(F): 94.7 (18 Aug 2019 19:32), Max: 96.7 (18 Aug 2019 17:06)  HR: 68 (18 Aug 2019 20:12) (55 - 98)  BP: 102/50 (18 Aug 2019 20:12) (66/28 - 114/66)  BP(mean): --  RR: 16 (18 Aug 2019 20:12) (16 - 18)  SpO2: 100% (18 Aug 2019 20:12) (98% - 100%)  I&O's Summary      PHYSICAL EXAM:  General: Adult obese female in bed, breathing with increased labor but protecting airway.   Resp; Saturating well on O2 via NC. CTA.  Taking frequent shallow breaths. 	  MSK: Significant edema in B/L LE, no deformities.   Neurologic:  Mental Status: Obtunded, not waking to noxious stimuli but localizes and withdraws to pain in all 4 extremities.   Cranial Nerves: PERRLA (R = 2mm, L = 2mm). No facial asymmetry b/l, full eye closure strength b/l.  Tongue midline, normal movements, no atrophy.  Motor: Normal muscle bulk & tone. No noticeable tremor, myoclonus or pronator drift. Not spontaneously moving extremities but no asymmetries in withdrawal during pain testing.   Sensation: Symmetric B/L preserved sensation to pain.    Reflexes: 3/4.  Plantar Responses are upgoing B/L.     CBC:                        12.4   19.05 )-----------( 112      ( 18 Aug 2019 17:26 )             40.7       CMP:  18    145  |  103  |  18  ----------------------------<  161<H>  3.4<L>   |  27  |  0.57    Ca    9.8      18 Aug 2019 17:26    TPro  7.3  /  Alb  3.2<L>  /  TBili  0.2  /  DBili  x   /  AST  31  /  ALT  20  /  AlkPhos  68  18    LIVER FUNCTIONS - ( 18 Aug 2019 17:26 )  Alb: 3.2 g/dL / Pro: 7.3 g/dL / ALK PHOS: 68 u/L / ALT: 20 u/L / AST: 31 u/L / GGT: x           UA:  Urinalysis Basic - ( 18 Aug 2019 17:31 )    Color: YELLOW / Appearance: CLEAR / S.030 / pH: 5.5  Gluc: 500 / Ketone: SMALL  / Bili: NEGATIVE / Urobili: NORMAL   Blood: NEGATIVE / Protein: 50 / Nitrite: NEGATIVE   Leuk Esterase: NEGATIVE / RBC: 0-2 / WBC 6-10   Sq Epi: OCC / Non Sq Epi: x / Bacteria: FEW

## 2019-08-18 NOTE — ED PROVIDER NOTE - CLINICAL SUMMARY MEDICAL DECISION MAKING FREE TEXT BOX
54yF w/pmhx seizure disorder on keppra and depakote, cerebral palsy BIBEMS from group home with concern for change to her breathing. Will check cbc/cmp/vbg/ keppra and depakote levels, CXR, ua and culture. /60s, will give IV fluids. Will continue to monitor. Pt does not meet sepsis criteria and is non-toxic appearing at this time.

## 2019-08-19 LAB
ALBUMIN SERPL ELPH-MCNC: 2.7 G/DL — LOW (ref 3.3–5)
ALP SERPL-CCNC: 55 U/L — SIGNIFICANT CHANGE UP (ref 40–120)
ALT FLD-CCNC: 16 U/L — SIGNIFICANT CHANGE UP (ref 4–33)
ANION GAP SERPL CALC-SCNC: 13 MMO/L — SIGNIFICANT CHANGE UP (ref 7–14)
AST SERPL-CCNC: 21 U/L — SIGNIFICANT CHANGE UP (ref 4–32)
BASE EXCESS BLDA CALC-SCNC: 2.1 MMOL/L — SIGNIFICANT CHANGE UP
BASE EXCESS BLDA CALC-SCNC: 3.3 MMOL/L — SIGNIFICANT CHANGE UP
BASOPHILS # BLD AUTO: 0.03 K/UL — SIGNIFICANT CHANGE UP (ref 0–0.2)
BASOPHILS NFR BLD AUTO: 0.2 % — SIGNIFICANT CHANGE UP (ref 0–2)
BILIRUB SERPL-MCNC: 0.3 MG/DL — SIGNIFICANT CHANGE UP (ref 0.2–1.2)
BUN SERPL-MCNC: 11 MG/DL — SIGNIFICANT CHANGE UP (ref 7–23)
CALCIUM SERPL-MCNC: 8.5 MG/DL — SIGNIFICANT CHANGE UP (ref 8.4–10.5)
CHLORIDE BLDA-SCNC: 113 MMOL/L — HIGH (ref 96–108)
CHLORIDE BLDA-SCNC: 116 MMOL/L — HIGH (ref 96–108)
CHLORIDE SERPL-SCNC: 109 MMOL/L — HIGH (ref 98–107)
CO2 SERPL-SCNC: 24 MMOL/L — SIGNIFICANT CHANGE UP (ref 22–31)
CREAT SERPL-MCNC: 0.48 MG/DL — LOW (ref 0.5–1.3)
EOSINOPHIL # BLD AUTO: 0.04 K/UL — SIGNIFICANT CHANGE UP (ref 0–0.5)
EOSINOPHIL NFR BLD AUTO: 0.2 % — SIGNIFICANT CHANGE UP (ref 0–6)
GLUCOSE BLDA-MCNC: 150 MG/DL — HIGH (ref 70–99)
GLUCOSE BLDA-MCNC: 90 MG/DL — SIGNIFICANT CHANGE UP (ref 70–99)
GLUCOSE SERPL-MCNC: 170 MG/DL — HIGH (ref 70–99)
GRAM STN SPT: SIGNIFICANT CHANGE UP
HCO3 BLDA-SCNC: 27 MMOL/L — HIGH (ref 22–26)
HCO3 BLDA-SCNC: 28 MMOL/L — HIGH (ref 22–26)
HCT VFR BLD CALC: 31.5 % — LOW (ref 34.5–45)
HCT VFR BLDA CALC: 32 % — LOW (ref 34.5–46.5)
HCT VFR BLDA CALC: 33.2 % — LOW (ref 34.5–46.5)
HGB BLD-MCNC: 10.3 G/DL — LOW (ref 11.5–15.5)
HGB BLDA-MCNC: 10.4 G/DL — LOW (ref 11.5–15.5)
HGB BLDA-MCNC: 10.7 G/DL — LOW (ref 11.5–15.5)
IMM GRANULOCYTES NFR BLD AUTO: 0.5 % — SIGNIFICANT CHANGE UP (ref 0–1.5)
LACTATE BLDA-SCNC: 1.2 MMOL/L — SIGNIFICANT CHANGE UP (ref 0.5–2)
LACTATE BLDA-SCNC: 2.6 MMOL/L — HIGH (ref 0.5–2)
LYMPHOCYTES # BLD AUTO: 18.6 % — SIGNIFICANT CHANGE UP (ref 13–44)
LYMPHOCYTES # BLD AUTO: 3.54 K/UL — HIGH (ref 1–3.3)
MAGNESIUM SERPL-MCNC: 1.4 MG/DL — LOW (ref 1.6–2.6)
MCHC RBC-ENTMCNC: 32.7 % — SIGNIFICANT CHANGE UP (ref 32–36)
MCHC RBC-ENTMCNC: 33.6 PG — SIGNIFICANT CHANGE UP (ref 27–34)
MCV RBC AUTO: 102.6 FL — HIGH (ref 80–100)
MONOCYTES # BLD AUTO: 1.59 K/UL — HIGH (ref 0–0.9)
MONOCYTES NFR BLD AUTO: 8.4 % — SIGNIFICANT CHANGE UP (ref 2–14)
NEUTROPHILS # BLD AUTO: 13.69 K/UL — HIGH (ref 1.8–7.4)
NEUTROPHILS NFR BLD AUTO: 72.1 % — SIGNIFICANT CHANGE UP (ref 43–77)
NRBC # FLD: 0 K/UL — SIGNIFICANT CHANGE UP (ref 0–0)
PCO2 BLDA: 29 MMHG — LOW (ref 32–48)
PCO2 BLDA: 32 MMHG — SIGNIFICANT CHANGE UP (ref 32–48)
PH BLDA: 7.53 PH — HIGH (ref 7.35–7.45)
PH BLDA: 7.54 PH — HIGH (ref 7.35–7.45)
PHOSPHATE SERPL-MCNC: 2.2 MG/DL — LOW (ref 2.5–4.5)
PLATELET # BLD AUTO: 122 K/UL — LOW (ref 150–400)
PMV BLD: 11.2 FL — SIGNIFICANT CHANGE UP (ref 7–13)
PO2 BLDA: 107 MMHG — SIGNIFICANT CHANGE UP (ref 83–108)
PO2 BLDA: 164 MMHG — HIGH (ref 83–108)
POTASSIUM BLDA-SCNC: 2.7 MMOL/L — CRITICAL LOW (ref 3.4–4.5)
POTASSIUM BLDA-SCNC: 3.3 MMOL/L — LOW (ref 3.4–4.5)
POTASSIUM SERPL-MCNC: 3.5 MMOL/L — SIGNIFICANT CHANGE UP (ref 3.5–5.3)
POTASSIUM SERPL-SCNC: 3.5 MMOL/L — SIGNIFICANT CHANGE UP (ref 3.5–5.3)
PROT SERPL-MCNC: 6.5 G/DL — SIGNIFICANT CHANGE UP (ref 6–8.3)
RBC # BLD: 3.07 M/UL — LOW (ref 3.8–5.2)
RBC # FLD: 15.9 % — HIGH (ref 10.3–14.5)
SAO2 % BLDA: 98.4 % — SIGNIFICANT CHANGE UP (ref 95–99)
SAO2 % BLDA: 98.8 % — SIGNIFICANT CHANGE UP (ref 95–99)
SODIUM BLDA-SCNC: 144 MMOL/L — SIGNIFICANT CHANGE UP (ref 136–146)
SODIUM BLDA-SCNC: 147 MMOL/L — HIGH (ref 136–146)
SODIUM SERPL-SCNC: 146 MMOL/L — HIGH (ref 135–145)
SPECIMEN SOURCE: SIGNIFICANT CHANGE UP
VALPROATE SERPL-MCNC: 108.8 UG/ML — HIGH (ref 50–100)
WBC # BLD: 18.99 K/UL — HIGH (ref 3.8–10.5)
WBC # FLD AUTO: 18.99 K/UL — HIGH (ref 3.8–10.5)

## 2019-08-19 PROCEDURE — 70450 CT HEAD/BRAIN W/O DYE: CPT | Mod: 26

## 2019-08-19 PROCEDURE — 99232 SBSQ HOSP IP/OBS MODERATE 35: CPT

## 2019-08-19 PROCEDURE — 71045 X-RAY EXAM CHEST 1 VIEW: CPT | Mod: 26

## 2019-08-19 PROCEDURE — 95819 EEG AWAKE AND ASLEEP: CPT | Mod: 26

## 2019-08-19 PROCEDURE — 99291 CRITICAL CARE FIRST HOUR: CPT | Mod: 25

## 2019-08-19 RX ORDER — LEVETIRACETAM 250 MG/1
1000 TABLET, FILM COATED ORAL EVERY 12 HOURS
Refills: 0 | Status: DISCONTINUED | OUTPATIENT
Start: 2019-08-19 | End: 2019-08-22

## 2019-08-19 RX ORDER — PROPOFOL 10 MG/ML
4 INJECTION, EMULSION INTRAVENOUS ONCE
Refills: 0 | Status: COMPLETED | OUTPATIENT
Start: 2019-08-19 | End: 2019-08-19

## 2019-08-19 RX ORDER — PROPOFOL 10 MG/ML
2 INJECTION, EMULSION INTRAVENOUS ONCE
Refills: 0 | Status: COMPLETED | OUTPATIENT
Start: 2019-08-19 | End: 2019-08-19

## 2019-08-19 RX ORDER — PROPOFOL 10 MG/ML
50 INJECTION, EMULSION INTRAVENOUS
Qty: 1000 | Refills: 0 | Status: DISCONTINUED | OUTPATIENT
Start: 2019-08-19 | End: 2019-08-19

## 2019-08-19 RX ORDER — MAGNESIUM SULFATE 500 MG/ML
2 VIAL (ML) INJECTION ONCE
Refills: 0 | Status: DISCONTINUED | OUTPATIENT
Start: 2019-08-19 | End: 2019-08-19

## 2019-08-19 RX ORDER — POTASSIUM PHOSPHATE, MONOBASIC POTASSIUM PHOSPHATE, DIBASIC 236; 224 MG/ML; MG/ML
15 INJECTION, SOLUTION INTRAVENOUS ONCE
Refills: 0 | Status: COMPLETED | OUTPATIENT
Start: 2019-08-19 | End: 2019-08-19

## 2019-08-19 RX ORDER — MIDAZOLAM HYDROCHLORIDE 1 MG/ML
2 INJECTION, SOLUTION INTRAMUSCULAR; INTRAVENOUS ONCE
Refills: 0 | Status: DISCONTINUED | OUTPATIENT
Start: 2019-08-19 | End: 2019-08-19

## 2019-08-19 RX ORDER — PIPERACILLIN AND TAZOBACTAM 4; .5 G/20ML; G/20ML
3.38 INJECTION, POWDER, LYOPHILIZED, FOR SOLUTION INTRAVENOUS ONCE
Refills: 0 | Status: COMPLETED | OUTPATIENT
Start: 2019-08-19 | End: 2019-08-19

## 2019-08-19 RX ORDER — VANCOMYCIN HCL 1 G
1000 VIAL (EA) INTRAVENOUS EVERY 12 HOURS
Refills: 0 | Status: DISCONTINUED | OUTPATIENT
Start: 2019-08-19 | End: 2019-08-20

## 2019-08-19 RX ORDER — MAGNESIUM SULFATE 500 MG/ML
2 VIAL (ML) INJECTION ONCE
Refills: 0 | Status: COMPLETED | OUTPATIENT
Start: 2019-08-19 | End: 2019-08-19

## 2019-08-19 RX ORDER — POTASSIUM CHLORIDE 20 MEQ
40 PACKET (EA) ORAL ONCE
Refills: 0 | Status: COMPLETED | OUTPATIENT
Start: 2019-08-19 | End: 2019-08-19

## 2019-08-19 RX ORDER — CHLORHEXIDINE GLUCONATE 213 G/1000ML
15 SOLUTION TOPICAL EVERY 12 HOURS
Refills: 0 | Status: DISCONTINUED | OUTPATIENT
Start: 2019-08-19 | End: 2019-08-26

## 2019-08-19 RX ORDER — PROPOFOL 10 MG/ML
3 INJECTION, EMULSION INTRAVENOUS ONCE
Refills: 0 | Status: COMPLETED | OUTPATIENT
Start: 2019-08-19 | End: 2019-08-19

## 2019-08-19 RX ORDER — MIDAZOLAM HYDROCHLORIDE 1 MG/ML
4 INJECTION, SOLUTION INTRAMUSCULAR; INTRAVENOUS ONCE
Refills: 0 | Status: DISCONTINUED | OUTPATIENT
Start: 2019-08-19 | End: 2019-08-19

## 2019-08-19 RX ORDER — NOREPINEPHRINE BITARTRATE/D5W 8 MG/250ML
0.1 PLASTIC BAG, INJECTION (ML) INTRAVENOUS
Qty: 8 | Refills: 0 | Status: DISCONTINUED | OUTPATIENT
Start: 2019-08-19 | End: 2019-08-21

## 2019-08-19 RX ORDER — PROPOFOL 10 MG/ML
10 INJECTION, EMULSION INTRAVENOUS
Qty: 1000 | Refills: 0 | Status: DISCONTINUED | OUTPATIENT
Start: 2019-08-19 | End: 2019-08-23

## 2019-08-19 RX ORDER — HEPARIN SODIUM 5000 [USP'U]/ML
5000 INJECTION INTRAVENOUS; SUBCUTANEOUS EVERY 8 HOURS
Refills: 0 | Status: DISCONTINUED | OUTPATIENT
Start: 2019-08-19 | End: 2019-08-22

## 2019-08-19 RX ORDER — CHLORHEXIDINE GLUCONATE 213 G/1000ML
1 SOLUTION TOPICAL
Refills: 0 | Status: DISCONTINUED | OUTPATIENT
Start: 2019-08-19 | End: 2019-09-24

## 2019-08-19 RX ORDER — PIPERACILLIN AND TAZOBACTAM 4; .5 G/20ML; G/20ML
3.38 INJECTION, POWDER, LYOPHILIZED, FOR SOLUTION INTRAVENOUS EVERY 8 HOURS
Refills: 0 | Status: COMPLETED | OUTPATIENT
Start: 2019-08-19 | End: 2019-08-23

## 2019-08-19 RX ADMIN — Medication 250 MILLIGRAM(S): at 18:13

## 2019-08-19 RX ADMIN — MIDAZOLAM HYDROCHLORIDE 2 MILLIGRAM(S): 1 INJECTION, SOLUTION INTRAMUSCULAR; INTRAVENOUS at 00:46

## 2019-08-19 RX ADMIN — PROPOFOL 20.49 MICROGRAM(S)/KG/MIN: 10 INJECTION, EMULSION INTRAVENOUS at 07:25

## 2019-08-19 RX ADMIN — Medication 12.81 MICROGRAM(S)/KG/MIN: at 05:49

## 2019-08-19 RX ADMIN — MIDAZOLAM HYDROCHLORIDE 2 MILLIGRAM(S): 1 INJECTION, SOLUTION INTRAMUSCULAR; INTRAVENOUS at 00:41

## 2019-08-19 RX ADMIN — POTASSIUM PHOSPHATE, MONOBASIC POTASSIUM PHOSPHATE, DIBASIC 62.5 MILLIMOLE(S): 236; 224 INJECTION, SOLUTION INTRAVENOUS at 15:56

## 2019-08-19 RX ADMIN — PIPERACILLIN AND TAZOBACTAM 200 GRAM(S): 4; .5 INJECTION, POWDER, LYOPHILIZED, FOR SOLUTION INTRAVENOUS at 02:30

## 2019-08-19 RX ADMIN — Medication 250 MILLIGRAM(S): at 06:10

## 2019-08-19 RX ADMIN — CHLORHEXIDINE GLUCONATE 15 MILLILITER(S): 213 SOLUTION TOPICAL at 05:49

## 2019-08-19 RX ADMIN — Medication 12.81 MICROGRAM(S)/KG/MIN: at 07:25

## 2019-08-19 RX ADMIN — Medication 1 APPLICATION(S): at 18:13

## 2019-08-19 RX ADMIN — HEPARIN SODIUM 5000 UNIT(S): 5000 INJECTION INTRAVENOUS; SUBCUTANEOUS at 13:18

## 2019-08-19 RX ADMIN — CHLORHEXIDINE GLUCONATE 15 MILLILITER(S): 213 SOLUTION TOPICAL at 18:13

## 2019-08-19 RX ADMIN — Medication 40 MILLIEQUIVALENT(S): at 16:24

## 2019-08-19 RX ADMIN — PROPOFOL 4.1 MICROGRAM(S)/KG/MIN: 10 INJECTION, EMULSION INTRAVENOUS at 08:21

## 2019-08-19 RX ADMIN — CHLORHEXIDINE GLUCONATE 1 APPLICATION(S): 213 SOLUTION TOPICAL at 07:24

## 2019-08-19 RX ADMIN — PIPERACILLIN AND TAZOBACTAM 25 GRAM(S): 4; .5 INJECTION, POWDER, LYOPHILIZED, FOR SOLUTION INTRAVENOUS at 09:49

## 2019-08-19 RX ADMIN — Medication 30 MILLIGRAM(S): at 23:39

## 2019-08-19 RX ADMIN — Medication 30 MILLIGRAM(S): at 15:06

## 2019-08-19 RX ADMIN — PIPERACILLIN AND TAZOBACTAM 25 GRAM(S): 4; .5 INJECTION, POWDER, LYOPHILIZED, FOR SOLUTION INTRAVENOUS at 18:14

## 2019-08-19 RX ADMIN — Medication 30 MILLIGRAM(S): at 07:32

## 2019-08-19 RX ADMIN — PROPOFOL 20.49 MICROGRAM(S)/KG/MIN: 10 INJECTION, EMULSION INTRAVENOUS at 05:49

## 2019-08-19 RX ADMIN — Medication 50 GRAM(S): at 04:58

## 2019-08-19 RX ADMIN — PROPOFOL 4.1 MICROGRAM(S)/KG/MIN: 10 INJECTION, EMULSION INTRAVENOUS at 21:05

## 2019-08-19 RX ADMIN — LEVETIRACETAM 400 MILLIGRAM(S): 250 TABLET, FILM COATED ORAL at 17:56

## 2019-08-19 RX ADMIN — HEPARIN SODIUM 5000 UNIT(S): 5000 INJECTION INTRAVENOUS; SUBCUTANEOUS at 21:04

## 2019-08-19 RX ADMIN — PROPOFOL 2 MILLIGRAM(S): 10 INJECTION, EMULSION INTRAVENOUS at 00:43

## 2019-08-19 RX ADMIN — HEPARIN SODIUM 5000 UNIT(S): 5000 INJECTION INTRAVENOUS; SUBCUTANEOUS at 05:49

## 2019-08-19 RX ADMIN — PROPOFOL 3 MILLIGRAM(S): 10 INJECTION, EMULSION INTRAVENOUS at 00:43

## 2019-08-19 RX ADMIN — MIDAZOLAM HYDROCHLORIDE 4 MILLIGRAM(S): 1 INJECTION, SOLUTION INTRAMUSCULAR; INTRAVENOUS at 01:40

## 2019-08-19 RX ADMIN — LEVETIRACETAM 400 MILLIGRAM(S): 250 TABLET, FILM COATED ORAL at 05:49

## 2019-08-19 RX ADMIN — Medication 12.81 MICROGRAM(S)/KG/MIN: at 21:05

## 2019-08-19 RX ADMIN — PROPOFOL 4 MILLIGRAM(S): 10 INJECTION, EMULSION INTRAVENOUS at 00:44

## 2019-08-19 NOTE — PROGRESS NOTE ADULT - ATTENDING COMMENTS
54 F with cerebral palsy and seizure disorder who presented with increased wob and was hypothermic/hypotensive, having seizures here, too, concern for septic shock due to aspiration pneumonia and seizure d/o.  Intubated for airway protection/acute hypoxic respiratory failure.  c/w vanc/zosyn.  Titrate AEDs and sedation with VEEG, f/u neuro reccs.  Repeat abg, cbc today. 54 F with cerebral palsy and seizure disorder who presented with increased wob and was hypothermic/hypotensive, having seizures here, too, concern for septic shock due to aspiration pneumonia and seizure d/o.  Intubated for airway protection/acute hypoxic respiratory failure.  c/w vanc/zosyn to cover for GPC and Gram negative organisms.  Titrate AEDs and sedation with VEEG, f/u neuro reccs.  Repeat abg, cbc today.

## 2019-08-19 NOTE — PROGRESS NOTE ADULT - SUBJECTIVE AND OBJECTIVE BOX
Patient is a 54y old  Female who presents with a chief complaint of Septic Shock (18 Aug 2019 21:45)      SUBJECTIVE / OVERNIGHT EVENTS:    Patient seen and examined at bedside. No acute events overnight.    Review of systems: Pt sedated and intubated     MEDICATIONS  (STANDING):  chlorhexidine 0.12% Liquid 15 milliLiter(s) Oral Mucosa every 12 hours  chlorhexidine 4% Liquid 1 Application(s) Topical <User Schedule>  heparin  Injectable 5000 Unit(s) SubCutaneous every 8 hours  levETIRAcetam  IVPB 1000 milliGRAM(s) IV Intermittent every 12 hours  norepinephrine Infusion 0.1 MICROgram(s)/kG/Min (12.806 mL/Hr) IV Continuous <Continuous>  petrolatum Ophthalmic Ointment 1 Application(s) Both EYES two times a day  piperacillin/tazobactam IVPB.. 3.375 Gram(s) IV Intermittent every 8 hours  propofol Infusion 10 MICROgram(s)/kG/Min (4.098 mL/Hr) IV Continuous <Continuous>  valproate sodium IVPB 1000 milliGRAM(s) IV Intermittent every 8 hours  vancomycin  IVPB 1000 milliGRAM(s) IV Intermittent every 12 hours    MEDICATIONS  (PRN):  LORazepam   Injectable 2 milliGRAM(s) IV Push every 6 hours PRN Seizure > 3 minutes or GTC      T(F): 98.9 ( @ 12:00), Max: 98.9 ( @ 12:00)  HR: 65 ( @ 12:00) (55 - 98)  BP: 108/59 ( @ 11:00) (66/28 - 161/78)  RR: 12 ( @ 12:00) (12 - 22)  SpO2: 97% ( @ 12:00) (84% - 100%)  CAPILLARY BLOOD GLUCOSE      POCT Blood Glucose.: 139 mg/dL (18 Aug 2019 18:29)    I&O's Summary    18 Aug 2019 07:01  -  19 Aug 2019 07:00  --------------------------------------------------------  IN: 730.8 mL / OUT: 1175 mL / NET: -444.2 mL    19 Aug 2019 07:01  -  19 Aug 2019 12:04  --------------------------------------------------------  IN: 234.5 mL / OUT: 600 mL / NET: -365.5 mL        PHYSICAL EXAM:  	HEAD: Atraumatic, Normocephalic  	EYES:  PERRL, conjunctiva and sclera clear  	ENMT: No tonsillar erythema, exudates, or enlargement; Moist mucous membranes. Poor dentition.  	NECK: Supple  	NERVOUS SYSTEM: Responsive to verbal stimuli. Withdraws from pain. HOWELL x 4.  	CHEST/LUNG: Decreased breath sounds bilaterally, poor inspiratory effort  	HEART: Regular rate and rhythm; S1 and S2; No murmurs  	ABDOMEN: Soft, Nontender, Nondistended: Bowel sounds present  	EXTREMITIES: 2+ Peripheral Pulses. 2+ pitting edema of the feet   	SKIN: No rashes or lesions    LABS:  Labs personally reviewed.                        11.0   25.77 )-----------( 127      ( 18 Aug 2019 22:00 )             36.7     Hgb Trend: 11.0<--, 12.4<--  08-19    146<H>  |  109<H>  |  11  ----------------------------<  170<H>  3.5   |  24  |  0.48<L>    Ca    8.5      19 Aug 2019 03:00  Phos  2.2       Mg     1.4         TPro  6.5  /  Alb  2.7<L>  /  TBili  0.3  /  DBili  x   /  AST  21  /  ALT  16  /  AlkPhos  55      Creatinine Trend: 0.48<--, 0.44<--, 0.57<--  PT/INR - ( 18 Aug 2019 22:00 )   PT: 11.9 SEC;   INR: 1.04          PTT - ( 18 Aug 2019 22:00 )  PTT:35.1 SEC  Urinalysis Basic - ( 18 Aug 2019 17:31 )    Color: YELLOW / Appearance: CLEAR / S.030 / pH: 5.5  Gluc: 500 / Ketone: SMALL  / Bili: NEGATIVE / Urobili: NORMAL   Blood: NEGATIVE / Protein: 50 / Nitrite: NEGATIVE   Leuk Esterase: NEGATIVE / RBC: 0-2 / WBC 6-10   Sq Epi: OCC / Non Sq Epi: x / Bacteria: FEW Patient is a 54y old  Female who presents with a chief complaint of Septic Shock (18 Aug 2019 21:45)      SUBJECTIVE / OVERNIGHT EVENTS:    Patient seen and examined at bedside. No acute events overnight.    Review of systems: Pt sedated and intubated     MEDICATIONS  (STANDING):  chlorhexidine 0.12% Liquid 15 milliLiter(s) Oral Mucosa every 12 hours  chlorhexidine 4% Liquid 1 Application(s) Topical <User Schedule>  heparin  Injectable 5000 Unit(s) SubCutaneous every 8 hours  levETIRAcetam  IVPB 1000 milliGRAM(s) IV Intermittent every 12 hours  norepinephrine Infusion 0.1 MICROgram(s)/kG/Min (12.806 mL/Hr) IV Continuous <Continuous>  petrolatum Ophthalmic Ointment 1 Application(s) Both EYES two times a day  piperacillin/tazobactam IVPB.. 3.375 Gram(s) IV Intermittent every 8 hours  propofol Infusion 10 MICROgram(s)/kG/Min (4.098 mL/Hr) IV Continuous <Continuous>  valproate sodium IVPB 1000 milliGRAM(s) IV Intermittent every 8 hours  vancomycin  IVPB 1000 milliGRAM(s) IV Intermittent every 12 hours    MEDICATIONS  (PRN):  LORazepam   Injectable 2 milliGRAM(s) IV Push every 6 hours PRN Seizure > 3 minutes or GTC      T(F): 98.9 ( @ 12:00), Max: 98.9 ( @ 12:00)  HR: 65 ( @ 12:00) (55 - 98)  BP: 108/59 ( @ 11:00) (66/28 - 161/78)  RR: 12 ( @ 12:00) (12 - 22)  SpO2: 97% ( @ 12:00) (84% - 100%)  CAPILLARY BLOOD GLUCOSE      POCT Blood Glucose.: 139 mg/dL (18 Aug 2019 18:29)    I&O's Summary    18 Aug 2019 07:01  -  19 Aug 2019 07:00  --------------------------------------------------------  IN: 730.8 mL / OUT: 1175 mL / NET: -444.2 mL    19 Aug 2019 07:01  -  19 Aug 2019 12:04  --------------------------------------------------------  IN: 234.5 mL / OUT: 600 mL / NET: -365.5 mL        PHYSICAL EXAM:  	HEAD: Atraumatic, Normocephalic  	EYES: PERRL, conjunctiva and sclera clear  	ENMT: No tonsillar erythema, exudates, or enlargement; Moist mucous membranes. Poor dentition.  	NECK: Supple  	NERVOUS SYSTEM: Pt sedated   	CHEST/LUNG: Decreased breath sounds bilaterally, poor inspiratory effort  	HEART: Regular rate and rhythm; S1 and S2; No murmurs  	ABDOMEN: Soft, Nontender, Nondistended: Bowel sounds present  	EXTREMITIES: 2+ Peripheral Pulses. 2+ pitting edema of the feet   	SKIN: No rashes or lesions    LABS:  Labs personally reviewed.                        11.0   25.77 )-----------( 127      ( 18 Aug 2019 22:00 )             36.7     Hgb Trend: 11.0<--, 12.4<--  08-19    146<H>  |  109<H>  |  11  ----------------------------<  170<H>  3.5   |  24  |  0.48<L>    Ca    8.5      19 Aug 2019 03:00  Phos  2.2       Mg     1.4         TPro  6.5  /  Alb  2.7<L>  /  TBili  0.3  /  DBili  x   /  AST  21  /  ALT  16  /  AlkPhos  55      Creatinine Trend: 0.48<--, 0.44<--, 0.57<--  PT/INR - ( 18 Aug 2019 22:00 )   PT: 11.9 SEC;   INR: 1.04          PTT - ( 18 Aug 2019 22:00 )  PTT:35.1 SEC  Urinalysis Basic - ( 18 Aug 2019 17:31 )    Color: YELLOW / Appearance: CLEAR / S.030 / pH: 5.5  Gluc: 500 / Ketone: SMALL  / Bili: NEGATIVE / Urobili: NORMAL   Blood: NEGATIVE / Protein: 50 / Nitrite: NEGATIVE   Leuk Esterase: NEGATIVE / RBC: 0-2 / WBC 6-10   Sq Epi: OCC / Non Sq Epi: x / Bacteria: FEW

## 2019-08-19 NOTE — PROGRESS NOTE ADULT - ASSESSMENT
Ms. Ponce is a 54F with a PMH of cerebral palsy and seizure disorder presenting from group home for acute AMS and increased work of breathing with recent seizure yesterday found to be hypothermic, hypotensive and bradycardic with leukocytosis and imaging showing new right lung opacification concerning for septic shock 2/2 to aspiration PNA. Admitted to MICU for further management.    NEURO: Patient has a history of cerebral palsy and seizure disorder. Baseline mental status minimally verbal but interactive/alert and withdraws to pain. Neurology on board. Lethargic currently 2/2 to metabolic encephalopathy due to infection.  - Intubated due to poor mental status s/p seizures concern for possible status epilepticus and hypercarbic respiratory failure  - Will treat for infection as detailed below to improve mental status   - Neurology recs appreciated  - CTH w/o contrast pending   - Depakote 1g TID and continue Keppra 1g BID (received a dose 1500mg at ~7PM) next dose at 7AM tomorrow   - On propofol for sedation while intubated and seizure management titrate to RAAS -2 to -5  - Ativan 2mg PRN for seizures > 3 minutes or GTC when off sedation  - Check depakote level in the AM   - On Zyprexa 7.5 mg QD holding for now as patient is sedated  -VEEG     CARDIOVASCULAR: No history of cardiac disease. However found to be hypotensive and bradycardic in the setting of sepsis. Pressor support improved SBPs and bradycardia.  - On levophed gtt 1.5 will titrate with goal MAP > 65  - Repeat lactate (5.9 -> 3.7)  - Will hold off on mIVF for now given 3L of NS in the ED    PULMONARY: Hypothermic with lung imaging concerning for new right lung opacification. On POCUS see signs of consolidation likely aspiration PNA due to seizure yesterday.  - S/p intubation in MICU for poor mental status and inability to maintain airway, as well as hypercarbic respiratory failure pCO2s 50s to 70s  - Vent settings  RR 16 FiO2 50% PEEP 5  - When plan to extubate will need steroids to minimize vocal cord edema  - Treat for aspiration PNA with Vancomycin (MRSA coverage for recent hospitalizations) and Zosyn until sputum cultures return  - Aspiration precautions   -Repeat ABG  -F/u sputum Cx     GI: Patient has a history of enteritis in the past but no GI symptoms currently. Eats pureed foods.  - Keep NPO  - OGT in place, consider feeds if remains intubated  - Aspiration precautions     RENAL:   - Cazares in place with clear urine output  - Replete electrolytes PRN    INFECTIOUS DISEASE: New right lung opacification with hypothermia and leukocytosis meeting SIRS criteria with presumable source aspiration PNA complicated by hypotension 2/2 to septic shock. UA negative.   - As mentioned above Zosyn and Vancomycin for now will de-escalate as clinically improves and blood cultures results return  - Obtain sputum cultures with suctioning of ETT  - If patient remains on high doses of pressor support will consider CT chest/abdomen/pelvis to rule out occult source of infection given also has a recent history of enteritis requiring MICU admission  - Vanc level pre-4th dose  - Follow up blood cultures  -F/U Legionella   - CBCs with diff daily     HEMATOLOGY: has thrombocytopenia but has had thrombocytopenia in the past could be 2/2 to infection  - CBCs with diff daily    ENDOCRINE:  - No active issues    SKIN:  - Has 2+ pitting edema of the feet likely due to immobility  - Few blanching pressure wounds on back of feet and sacral area     DVT PPx:  - HSQ Q8H    Code Status: Full

## 2019-08-19 NOTE — EEG REPORT - NS EEG TEXT BOX
[facility]   COMPREHENSIVE EPILEPSY CENTER   REPORT OF ROUTINE VIDEO EEG     [facility]: [address], [place] [address], Ph#: [phone]  [facility]: [address], [place] [address], Ph#: [phone]  Office: [address] [place] [address], [place] [address], [place] [address] Ph#: [phone]    Patient Name: NAYANA JACQUES  Age and : 54y ([birth year])  MRN #: [id number]  Location: [place] [address]  Referring Physician: Roberth Renner    Study Date: 19    _____________________________________________________________  TECHNICAL INFORMATION    Placement and Labeling of Electrodes:  The EEG was performed utilizing 20 channels referential EEG connections (coronal over temporal over parasagittal montage) using all standard 10-20 electrode placements with EKG.  Recording was at a sampling rate of 256 samples per second per channel.  Time synchronized digital video recording was done simultaneously with EEG recording.  A low light infrared camera was used for low light recording.  Valerio and seizure detection algorithms were utilized.    _____________________________________________________________  HISTORY    Patient is a 54y old  Female h/o cerebral palsy, seizure disorder who presents with a chief complaint of Septic Shock (19 Aug 2019 12:04)      PERTINENT MEDICATION:  levETIRAcetam  IVPB 1000 milliGRAM(s) IV Intermittent every 12 hours  propofol Infusion 10 MICROgram(s)/kG/Min (4.098 mL/Hr) IV Continuous <Continuous>  valproate sodium IVPB 1000 milliGRAM(s) IV Intermittent every 8 hours    _____________________________________________________________  STUDY INTERPRETATION    Findings: The background was discontinuous,  variable and minimally reactive. No posterior dominant rhythm seen.    Background Slowing:  Excess diffuse polymorphic delta slowing.      Focal Slowing:   None were present.    Sleep Background:  Stage II sleep transients were not recorded.    Other Non-Epileptiform Findings:  None were present.    Interictal Epileptiform Activity:   Occasional sharps over FP2-F4 region     Events:  Clinical events: None recorded.  Seizures: None recorded.    Activation Procedures:   Hyperventilation was not performed.    Photic stimulation was performed and did not elicit any abnormality.     Artifacts:  Intermittent myogenic and movement artifacts were noted.    ECG:  The heart rate on single channel ECG was predominantly between 60-70 BPM.    _____________________________________________________________  EEG SUMMARY/CLASSIFICATION    Abnormal EEG in  a sedated patient.    - Occasional sharps over FP2-F4 region   - Moderate generalized slowing.    _____________________________________________________________  EEG IMPRESSION/CLINICAL CORRELATE    Abnormal EEG study.  1. Potential epileptogenic focus in the right frontal region    2. Moderate nonspecific diffuse or multifocal cerebral dysfunction.   3. No epileptiform pattern or seizure seen.      **Prelim Report**  _____________________________________________________________    Alireza Arreola MD   Pediatric Neurology Resident, [facility]    Willem Nichols MD  Attending Physician, [facility]   COMPREHENSIVE EPILEPSY CENTER   REPORT OF ROUTINE VIDEO EEG     [facility]: [address] , [address], [place] [address], Ph#: [phone]  [facility]: [address], [place] [address], Ph#: [phone]  Office: [address] [place] [address], [place] [address], [place] [address] Ph#: [phone]    Patient Name: NAYANA JACQUES  Age and : 54y (67)  MRN #: [id number]  Location: [place] [address]  Referring Physician: Roberth Renner    Study Date: 19    _____________________________________________________________  TECHNICAL INFORMATION    Placement and Labeling of Electrodes:  The EEG was performed utilizing 20 channels referential EEG connections (coronal over temporal over parasagittal montage) using all standard 10-20 electrode placements with EKG.  Recording was at a sampling rate of 256 samples per second per channel.  Time synchronized digital video recording was done simultaneously with EEG recording.  A low light infrared camera was used for low light recording.  Valerio and seizure detection algorithms were utilized.    _____________________________________________________________  HISTORY    Patient is a 54y old  Female h/o cerebral palsy, seizure disorder who presents with a chief complaint of Septic Shock (19 Aug 2019 12:04)      PERTINENT MEDICATION:  levETIRAcetam  IVPB 1000 milliGRAM(s) IV Intermittent every 12 hours  propofol Infusion 10 MICROgram(s)/kG/Min (4.098 mL/Hr) IV Continuous <Continuous>  valproate sodium IVPB 1000 milliGRAM(s) IV Intermittent every 8 hours    _____________________________________________________________  STUDY INTERPRETATION    Findings: The background was discontinuous,  variable and minimally reactive. No posterior dominant rhythm seen.    Background Slowing:  Excess diffuse polymorphic delta slowing.      Focal Slowing:   None were present.    Sleep Background:  Stage II sleep transients were not recorded.    Other Non-Epileptiform Findings:  None were present.    Interictal Epileptiform Activity:   Occasional sharps over FP2-F4 region     Events:  Clinical events: None recorded.  Seizures: None recorded.    Activation Procedures:   Hyperventilation was not performed.    Photic stimulation was performed and did not elicit any abnormality.     Artifacts:  Intermittent myogenic and movement artifacts were noted.    ECG:  The heart rate on single channel ECG was predominantly between 60-70 BPM.    _____________________________________________________________  EEG SUMMARY/CLASSIFICATION    Abnormal EEG in  a sedated patient.    - Background was discontinuous, variable and minimally reactive. No posterior dominant rhythm seen.  - Occasional sharps over FP2-F4 region   - Moderate generalized slowing.    _____________________________________________________________  EEG IMPRESSION/CLINICAL CORRELATE    Abnormal EEG study.  1. Potential epileptogenic focus in the right frontal region    2. Moderate nonspecific diffuse or multifocal cerebral dysfunction.   3. No epileptiform pattern or seizure seen.      **Prelim Report**  _____________________________________________________________    Alireza Arreola MD   Pediatric Neurology Resident, [facility]    Willem Nichols MD  Attending Physician, [facility]   COMPREHENSIVE EPILEPSY CENTER   REPORT OF ROUTINE VIDEO EEG     [facility]: [address] , [address], [place] [address], Ph#: [phone]  [facility]: [address], [place] [address], Ph#: [phone]  Office: [address] [place] [address], [place] [address], [place] [address] Ph#: [phone]    Patient Name: NAYANA JACQUES  Age and : 54y (67)  MRN #: [id number]  Location: [place] [address]  Referring Physician: Roberth Renner    Study Date: 19    _____________________________________________________________  TECHNICAL INFORMATION    Placement and Labeling of Electrodes:  The EEG was performed utilizing 20 channels referential EEG connections (coronal over temporal over parasagittal montage) using all standard 10-20 electrode placements with EKG.  Recording was at a sampling rate of 256 samples per second per channel.  Time synchronized digital video recording was done simultaneously with EEG recording.  A low light infrared camera was used for low light recording.  Valerio and seizure detection algorithms were utilized.    _____________________________________________________________  HISTORY    Patient is a 54y old  Female h/o cerebral palsy, seizure disorder who presents with a chief complaint of Septic Shock (19 Aug 2019 12:04)      PERTINENT MEDICATION:  levETIRAcetam  IVPB 1000 milliGRAM(s) IV Intermittent every 12 hours  propofol Infusion 10 MICROgram(s)/kG/Min (4.098 mL/Hr) IV Continuous <Continuous>  valproate sodium IVPB 1000 milliGRAM(s) IV Intermittent every 8 hours    _____________________________________________________________  STUDY INTERPRETATION    Findings: The background was discontinuous,  variable and minimally reactive. No posterior dominant rhythm seen.    Background Slowing:  Excess diffuse polymorphic delta slowing.      Focal Slowing:   None were present.    Sleep Background:  Stage II sleep transients were not recorded.    Other Non-Epileptiform Findings:  None were present.    Interictal Epileptiform Activity:   Occasional to frequent fronto-temporal sharps (max. F8 with temporal field).    Events:  Clinical events: None recorded.  Seizures: None recorded.    Activation Procedures:   Hyperventilation was not performed.    Photic stimulation was performed and did not elicit any abnormality.     Artifacts:  Intermittent myogenic and movement artifacts were noted.    ECG:  The heart rate on single channel ECG was predominantly between 60-70 BPM.    _____________________________________________________________  EEG SUMMARY/CLASSIFICATION    Abnormal EEG in  a sedated patient.    - Background was discontinuous, variable and minimally reactive. No posterior dominant rhythm seen.  - Occasional sharps over right fronto-temporal region  - Moderate generalized slowing.    _____________________________________________________________  EEG IMPRESSION/CLINICAL CORRELATE    Abnormal EEG study.  1. Potential epileptogenic focus in the right fronto-temporal region    2. Moderate nonspecific diffuse or multifocal cerebral dysfunction.   3. No seizure seen.      _____________________________________________________________    Alireza Arreola MD   Pediatric Neurology Resident, [facility]    Gunner Nichols MD  Attending Physician, [facility] [place]

## 2019-08-20 LAB
ALBUMIN SERPL ELPH-MCNC: 2.4 G/DL — LOW (ref 3.3–5)
ALP SERPL-CCNC: 50 U/L — SIGNIFICANT CHANGE UP (ref 40–120)
ALT FLD-CCNC: 16 U/L — SIGNIFICANT CHANGE UP (ref 4–33)
AMMONIA BLD-MCNC: 56 UMOL/L — HIGH (ref 11–55)
ANION GAP SERPL CALC-SCNC: 12 MMO/L — SIGNIFICANT CHANGE UP (ref 7–14)
AST SERPL-CCNC: 19 U/L — SIGNIFICANT CHANGE UP (ref 4–32)
BACTERIA UR CULT: SIGNIFICANT CHANGE UP
BASE EXCESS BLDA CALC-SCNC: 1 MMOL/L — SIGNIFICANT CHANGE UP
BASOPHILS # BLD AUTO: 0.01 K/UL — SIGNIFICANT CHANGE UP (ref 0–0.2)
BASOPHILS NFR BLD AUTO: 0.1 % — SIGNIFICANT CHANGE UP (ref 0–2)
BILIRUB SERPL-MCNC: < 0.2 MG/DL — LOW (ref 0.2–1.2)
BUN SERPL-MCNC: 9 MG/DL — SIGNIFICANT CHANGE UP (ref 7–23)
CALCIUM SERPL-MCNC: 8.4 MG/DL — SIGNIFICANT CHANGE UP (ref 8.4–10.5)
CHLORIDE BLDA-SCNC: 119 MMOL/L — HIGH (ref 96–108)
CHLORIDE SERPL-SCNC: 113 MMOL/L — HIGH (ref 98–107)
CO2 SERPL-SCNC: 22 MMOL/L — SIGNIFICANT CHANGE UP (ref 22–31)
CREAT SERPL-MCNC: 0.62 MG/DL — SIGNIFICANT CHANGE UP (ref 0.5–1.3)
EOSINOPHIL # BLD AUTO: 0.09 K/UL — SIGNIFICANT CHANGE UP (ref 0–0.5)
EOSINOPHIL NFR BLD AUTO: 0.7 % — SIGNIFICANT CHANGE UP (ref 0–6)
GLUCOSE BLDA-MCNC: 95 MG/DL — SIGNIFICANT CHANGE UP (ref 70–99)
GLUCOSE SERPL-MCNC: 78 MG/DL — SIGNIFICANT CHANGE UP (ref 70–99)
HCO3 BLDA-SCNC: 25 MMOL/L — SIGNIFICANT CHANGE UP (ref 22–26)
HCT VFR BLD CALC: 31.5 % — LOW (ref 34.5–45)
HCT VFR BLDA CALC: 31 % — LOW (ref 34.5–46.5)
HGB BLD-MCNC: 10 G/DL — LOW (ref 11.5–15.5)
HGB BLDA-MCNC: 10 G/DL — LOW (ref 11.5–15.5)
IMM GRANULOCYTES NFR BLD AUTO: 0.4 % — SIGNIFICANT CHANGE UP (ref 0–1.5)
L PNEUMO AG UR QL: NEGATIVE — SIGNIFICANT CHANGE UP
LACTATE BLDA-SCNC: 1.6 MMOL/L — SIGNIFICANT CHANGE UP (ref 0.5–2)
LYMPHOCYTES # BLD AUTO: 27.9 % — SIGNIFICANT CHANGE UP (ref 13–44)
LYMPHOCYTES # BLD AUTO: 3.37 K/UL — HIGH (ref 1–3.3)
MAGNESIUM SERPL-MCNC: 2.1 MG/DL — SIGNIFICANT CHANGE UP (ref 1.6–2.6)
MCHC RBC-ENTMCNC: 31.7 % — LOW (ref 32–36)
MCHC RBC-ENTMCNC: 32.7 PG — SIGNIFICANT CHANGE UP (ref 27–34)
MCV RBC AUTO: 102.9 FL — HIGH (ref 80–100)
MONOCYTES # BLD AUTO: 1 K/UL — HIGH (ref 0–0.9)
MONOCYTES NFR BLD AUTO: 8.3 % — SIGNIFICANT CHANGE UP (ref 2–14)
NEUTROPHILS # BLD AUTO: 7.56 K/UL — HIGH (ref 1.8–7.4)
NEUTROPHILS NFR BLD AUTO: 62.6 % — SIGNIFICANT CHANGE UP (ref 43–77)
NRBC # FLD: 0.07 K/UL — SIGNIFICANT CHANGE UP (ref 0–0)
PCO2 BLDA: 40 MMHG — SIGNIFICANT CHANGE UP (ref 32–48)
PH BLDA: 7.41 PH — SIGNIFICANT CHANGE UP (ref 7.35–7.45)
PHOSPHATE SERPL-MCNC: 4.2 MG/DL — SIGNIFICANT CHANGE UP (ref 2.5–4.5)
PLATELET # BLD AUTO: 104 K/UL — LOW (ref 150–400)
PMV BLD: 10.9 FL — SIGNIFICANT CHANGE UP (ref 7–13)
PO2 BLDA: 87 MMHG — SIGNIFICANT CHANGE UP (ref 83–108)
POTASSIUM BLDA-SCNC: 3.3 MMOL/L — LOW (ref 3.4–4.5)
POTASSIUM SERPL-MCNC: 3.8 MMOL/L — SIGNIFICANT CHANGE UP (ref 3.5–5.3)
POTASSIUM SERPL-SCNC: 3.8 MMOL/L — SIGNIFICANT CHANGE UP (ref 3.5–5.3)
PROT SERPL-MCNC: 6 G/DL — SIGNIFICANT CHANGE UP (ref 6–8.3)
RBC # BLD: 3.06 M/UL — LOW (ref 3.8–5.2)
RBC # FLD: 16.6 % — HIGH (ref 10.3–14.5)
SAO2 % BLDA: 96 % — SIGNIFICANT CHANGE UP (ref 95–99)
SODIUM BLDA-SCNC: 143 MMOL/L — SIGNIFICANT CHANGE UP (ref 136–146)
SODIUM SERPL-SCNC: 147 MMOL/L — HIGH (ref 135–145)
SPECIMEN SOURCE: SIGNIFICANT CHANGE UP
VANCOMYCIN TROUGH SERPL-MCNC: 18.6 UG/ML — SIGNIFICANT CHANGE UP (ref 10–20)
WBC # BLD: 12.08 K/UL — HIGH (ref 3.8–10.5)
WBC # FLD AUTO: 12.08 K/UL — HIGH (ref 3.8–10.5)

## 2019-08-20 PROCEDURE — 99291 CRITICAL CARE FIRST HOUR: CPT | Mod: 25

## 2019-08-20 RX ADMIN — Medication 12.81 MICROGRAM(S)/KG/MIN: at 21:57

## 2019-08-20 RX ADMIN — HEPARIN SODIUM 5000 UNIT(S): 5000 INJECTION INTRAVENOUS; SUBCUTANEOUS at 14:26

## 2019-08-20 RX ADMIN — PIPERACILLIN AND TAZOBACTAM 25 GRAM(S): 4; .5 INJECTION, POWDER, LYOPHILIZED, FOR SOLUTION INTRAVENOUS at 01:54

## 2019-08-20 RX ADMIN — PIPERACILLIN AND TAZOBACTAM 25 GRAM(S): 4; .5 INJECTION, POWDER, LYOPHILIZED, FOR SOLUTION INTRAVENOUS at 17:36

## 2019-08-20 RX ADMIN — Medication 250 MILLIGRAM(S): at 06:06

## 2019-08-20 RX ADMIN — CHLORHEXIDINE GLUCONATE 15 MILLILITER(S): 213 SOLUTION TOPICAL at 17:14

## 2019-08-20 RX ADMIN — HEPARIN SODIUM 5000 UNIT(S): 5000 INJECTION INTRAVENOUS; SUBCUTANEOUS at 21:57

## 2019-08-20 RX ADMIN — Medication 1 APPLICATION(S): at 17:14

## 2019-08-20 RX ADMIN — CHLORHEXIDINE GLUCONATE 1 APPLICATION(S): 213 SOLUTION TOPICAL at 07:18

## 2019-08-20 RX ADMIN — PROPOFOL 4.1 MICROGRAM(S)/KG/MIN: 10 INJECTION, EMULSION INTRAVENOUS at 21:57

## 2019-08-20 RX ADMIN — PROPOFOL 4.1 MICROGRAM(S)/KG/MIN: 10 INJECTION, EMULSION INTRAVENOUS at 07:19

## 2019-08-20 RX ADMIN — LEVETIRACETAM 400 MILLIGRAM(S): 250 TABLET, FILM COATED ORAL at 17:14

## 2019-08-20 RX ADMIN — HEPARIN SODIUM 5000 UNIT(S): 5000 INJECTION INTRAVENOUS; SUBCUTANEOUS at 06:06

## 2019-08-20 RX ADMIN — Medication 30 MILLIGRAM(S): at 16:33

## 2019-08-20 RX ADMIN — Medication 12.81 MICROGRAM(S)/KG/MIN: at 07:18

## 2019-08-20 RX ADMIN — Medication 30 MILLIGRAM(S): at 07:21

## 2019-08-20 RX ADMIN — LEVETIRACETAM 400 MILLIGRAM(S): 250 TABLET, FILM COATED ORAL at 06:06

## 2019-08-20 RX ADMIN — PIPERACILLIN AND TAZOBACTAM 25 GRAM(S): 4; .5 INJECTION, POWDER, LYOPHILIZED, FOR SOLUTION INTRAVENOUS at 10:41

## 2019-08-20 RX ADMIN — CHLORHEXIDINE GLUCONATE 15 MILLILITER(S): 213 SOLUTION TOPICAL at 06:06

## 2019-08-20 RX ADMIN — Medication 1 APPLICATION(S): at 06:06

## 2019-08-20 NOTE — PROGRESS NOTE ADULT - SUBJECTIVE AND OBJECTIVE BOX
Patient is a 54y old  Female who presents with a chief complaint of Septic Shock (19 Aug 2019 12:04)      SUBJECTIVE / OVERNIGHT EVENTS:    Patient seen and examined at bedside. No acute events overnight.    Review of systems:     MEDICATIONS  (STANDING):  chlorhexidine 0.12% Liquid 15 milliLiter(s) Oral Mucosa every 12 hours  chlorhexidine 4% Liquid 1 Application(s) Topical <User Schedule>  heparin  Injectable 5000 Unit(s) SubCutaneous every 8 hours  levETIRAcetam  IVPB 1000 milliGRAM(s) IV Intermittent every 12 hours  norepinephrine Infusion 0.1 MICROgram(s)/kG/Min (12.806 mL/Hr) IV Continuous <Continuous>  petrolatum Ophthalmic Ointment 1 Application(s) Both EYES two times a day  piperacillin/tazobactam IVPB.. 3.375 Gram(s) IV Intermittent every 8 hours  propofol Infusion 10 MICROgram(s)/kG/Min (4.098 mL/Hr) IV Continuous <Continuous>  valproate sodium IVPB 1000 milliGRAM(s) IV Intermittent every 8 hours  vancomycin  IVPB 1000 milliGRAM(s) IV Intermittent every 12 hours    MEDICATIONS  (PRN):  LORazepam   Injectable 2 milliGRAM(s) IV Push every 6 hours PRN Seizure > 3 minutes or GTC      T(F): 98.9 ( @ 05:00), Max: 99 ( @ 16:00)  HR: 92 ( @ 06:00) (36 - 92)  BP: 97/53 ( @ 06:00) (86/49 - 146/85)  RR: 12 ( @ 06:00) (12 - 14)  SpO2: 96% ( @ 06:00) (96% - 100%)  CAPILLARY BLOOD GLUCOSE        I&O's Summary    18 Aug 2019 07:  -  19 Aug 2019 07:00  --------------------------------------------------------  IN: 740.8 mL / OUT: 1175 mL / NET: -434.2 mL    19 Aug 2019 07:01  -  20 Aug 2019 06:04  --------------------------------------------------------  IN: 1952.3 mL / OUT: 1420 mL / NET: 532.3 mL        PHYSICAL EXAM:        LABS:  Labs personally reviewed.                        10.0   12.08 )-----------( 104      ( 20 Aug 2019 04:00 )             31.5     Hgb Trend: 10.0<--, 10.3<--, 11.0<--, 12.4<--  08-20    147<H>  |  113<H>  |  9   ----------------------------<  78  3.8   |  22  |  0.62    Ca    8.4      20 Aug 2019 04:00  Phos  4.2     08-20  Mg     2.1     08-20    TPro  6.0  /  Alb  2.4<L>  /  TBili  < 0.2<L>  /  DBili  x   /  AST  19  /  ALT  16  /  AlkPhos  50  08-20    Creatinine Trend: 0.62<--, 0.48<--, 0.44<--, 0.57<--  PT/INR - ( 18 Aug 2019 22:00 )   PT: 11.9 SEC;   INR: 1.04          PTT - ( 18 Aug 2019 22:00 )  PTT:35.1 SEC  Urinalysis Basic - ( 18 Aug 2019 17:31 )    Color: YELLOW / Appearance: CLEAR / S.030 / pH: 5.5  Gluc: 500 / Ketone: SMALL  / Bili: NEGATIVE / Urobili: NORMAL   Blood: NEGATIVE / Protein: 50 / Nitrite: NEGATIVE   Leuk Esterase: NEGATIVE / RBC: 0-2 / WBC 6-10   Sq Epi: OCC / Non Sq Epi: x / Bacteria: FEW Patient is a 54y old  Female who presents with a chief complaint of Septic Shock (19 Aug 2019 12:04)      SUBJECTIVE / OVERNIGHT EVENTS:    Patient seen and examined at bedside. Pt started on free H20 for hypernatremia to 151    Review of systems: Cannot assess, pt intubated and sedated    MEDICATIONS  (STANDING):  chlorhexidine 0.12% Liquid 15 milliLiter(s) Oral Mucosa every 12 hours  chlorhexidine 4% Liquid 1 Application(s) Topical <User Schedule>  heparin  Injectable 5000 Unit(s) SubCutaneous every 8 hours  levETIRAcetam  IVPB 1000 milliGRAM(s) IV Intermittent every 12 hours  norepinephrine Infusion 0.1 MICROgram(s)/kG/Min (12.806 mL/Hr) IV Continuous <Continuous>  petrolatum Ophthalmic Ointment 1 Application(s) Both EYES two times a day  piperacillin/tazobactam IVPB.. 3.375 Gram(s) IV Intermittent every 8 hours  propofol Infusion 10 MICROgram(s)/kG/Min (4.098 mL/Hr) IV Continuous <Continuous>  valproate sodium IVPB 1000 milliGRAM(s) IV Intermittent every 8 hours  vancomycin  IVPB 1000 milliGRAM(s) IV Intermittent every 12 hours    MEDICATIONS  (PRN):  LORazepam   Injectable 2 milliGRAM(s) IV Push every 6 hours PRN Seizure > 3 minutes or GTC      T(F): 98.9 ( @ 05:00), Max: 99 ( @ 16:00)  HR: 92 ( @ 06:00) (36 - 92)  BP: 97/53 ( @ 06:00) (86/49 - 146/85)  RR: 12 ( @ 06:00) (12 - 14)  SpO2: 96% ( @ 06:00) (96% - 100%)  CAPILLARY BLOOD GLUCOSE    I&O's Summary    18 Aug 2019 07:  -  19 Aug 2019 07:00  --------------------------------------------------------  IN: 740.8 mL / OUT: 1175 mL / NET: -434.2 mL    19 Aug 2019 07:01  -  20 Aug 2019 06:04  --------------------------------------------------------  IN: 1952.3 mL / OUT: 1420 mL / NET: 532.3 mL      PHYSICAL EXAM:  HEAD: Atraumatic, Normocephalic  	EYES: PERRL, conjunctiva and sclera clear  	ENMT: Moist mucous membranes. Poor dentition.  	NECK: Supple  	NERVOUS SYSTEM: Pt sedated, withdraws to pain  	CHEST/LUNG: Decreased breath sounds bilaterally  	HEART: Regular rate and rhythm; S1 and S2; No murmurs/no rubs/no gallops  	ABDOMEN: Soft, Nondistended: Bowel sounds present  	EXTREMITIES: 2+ Peripheral Pulses. 2+ pitting edema of the feet   	SKIN: Few blanching pressure wounds on back of feet and sacral area, +ecchymosis on forearm and dorsum of hand       LABS:  Labs personally reviewed.                        10.0   12.08 )-----------( 104      ( 20 Aug 2019 04:00 )             31.5     Hgb Trend: 10.0<--, 10.3<--, 11.0<--, 12.4<--  08-20    147<H>  |  113<H>  |  9   ----------------------------<  78  3.8   |  22  |  0.62    Ca    8.4      20 Aug 2019 04:00  Phos  4.2     08-20  Mg     2.1     08-20    TPro  6.0  /  Alb  2.4<L>  /  TBili  < 0.2<L>  /  DBili  x   /  AST  19  /  ALT  16  /  AlkPhos  50  08-20    Creatinine Trend: 0.62<--, 0.48<--, 0.44<--, 0.57<--  PT/INR - ( 18 Aug 2019 22:00 )   PT: 11.9 SEC;   INR: 1.04          PTT - ( 18 Aug 2019 22:00 )  PTT:35.1 SEC  Urinalysis Basic - ( 18 Aug 2019 17:31 )    Color: YELLOW / Appearance: CLEAR / S.030 / pH: 5.5  Gluc: 500 / Ketone: SMALL  / Bili: NEGATIVE / Urobili: NORMAL   Blood: NEGATIVE / Protein: 50 / Nitrite: NEGATIVE   Leuk Esterase: NEGATIVE / RBC: 0-2 / WBC 6-10   Sq Epi: OCC / Non Sq Epi: x / Bacteria: FEW

## 2019-08-20 NOTE — DIETITIAN INITIAL EVALUATION ADULT. - OTHER INFO
Pt. visited, remains intubated, per nursing EN on hold for trial of extubation, was on EN @ goal & tolerating . Pt. w/ multiple admissions this year  , documented Ht. differs in all the admissions, noted wt. decrease , was on pureed diet w/ thickened liquids in hospital and continued @ group home . Noted 2+ generalized edema along w/ wt. increase .

## 2019-08-20 NOTE — PROGRESS NOTE ADULT - ATTENDING COMMENTS
Patient with cerebral palsy and seizures p/w increased wob, septic shock due to aspiration pneumonia and seizures, intubated for acute hypoxic respiratory failure.  Will continue with zosyn to treat for gram negative organisms in setting of aspiration, likely can d/c vancomycin.  Case d/w neurology, no need for AED. c/w current AEDs, will try to wean off propofol.

## 2019-08-20 NOTE — DIETITIAN INITIAL EVALUATION ADULT. - ETIOLOGY
related to physiological causes - chronic illness, texture modified meals, repeated hospitalizations

## 2019-08-20 NOTE — PROGRESS NOTE ADULT - ASSESSMENT
Ms. Ponce is a 54F with a PMH of cerebral palsy and seizure disorder presenting from group home for acute AMS and increased work of breathing with recent seizure yesterday found to be hypothermic, hypotensive and bradycardic with leukocytosis and imaging showing new right lung opacification concerning for septic shock 2/2 to aspiration PNA. Admitted to MICU for further management.    NEURO: Patient has a history of cerebral palsy and seizure disorder. Baseline mental status minimally verbal but interactive/alert and withdraws to pain. Neurology on board. Lethargic currently 2/2 to metabolic encephalopathy due to infection.  - Intubated due to poor mental status s/p seizures concern for possible status epilepticus and hypercarbic respiratory failure  - Will treat for infection as detailed below to improve mental status   - Neurology recs appreciated  - CTH w/o contrast pending   - Depakote 1g TID and continue Keppra 1g BID (received a dose 1500mg at ~7PM) next dose at 7AM tomorrow   - On propofol for sedation while intubated and seizure management titrate to RAAS -2 to -5  - Ativan 2mg PRN for seizures > 3 minutes or GTC when off sedation  - Check depakote level in the AM   - On Zyprexa 7.5 mg QD holding for now as patient is sedated  -VEEG     CARDIOVASCULAR: No history of cardiac disease. However found to be hypotensive and bradycardic in the setting of sepsis. Pressor support improved SBPs and bradycardia.  - On levophed gtt 1.5 will titrate with goal MAP > 65  - Repeat lactate (5.9 -> 3.7)  - Will hold off on mIVF for now given 3L of NS in the ED    PULMONARY: Hypothermic with lung imaging concerning for new right lung opacification. On POCUS see signs of consolidation likely aspiration PNA due to seizure yesterday.  - S/p intubation in MICU for poor mental status and inability to maintain airway, as well as hypercarbic respiratory failure pCO2s 50s to 70s  - Vent settings  RR 16 FiO2 50% PEEP 5  - When plan to extubate will need steroids to minimize vocal cord edema  - Treat for aspiration PNA with Vancomycin (MRSA coverage for recent hospitalizations) and Zosyn until sputum cultures return  - Aspiration precautions   -Repeat ABG  -F/u sputum Cx     GI: Patient has a history of enteritis in the past but no GI symptoms currently. Eats pureed foods.  - Keep NPO  - OGT in place, consider feeds if remains intubated  - Aspiration precautions     RENAL:   - Cazares in place with clear urine output  - Replete electrolytes PRN    INFECTIOUS DISEASE: New right lung opacification with hypothermia and leukocytosis meeting SIRS criteria with presumable source aspiration PNA complicated by hypotension 2/2 to septic shock. UA negative.   - As mentioned above Zosyn and Vancomycin for now will de-escalate as clinically improves and blood cultures results return  - Obtain sputum cultures with suctioning of ETT  - If patient remains on high doses of pressor support will consider CT chest/abdomen/pelvis to rule out occult source of infection given also has a recent history of enteritis requiring MICU admission  - Vanc level pre-4th dose  - Follow up blood cultures  -F/U Legionella   - CBCs with diff daily     HEMATOLOGY: has thrombocytopenia but has had thrombocytopenia in the past could be 2/2 to infection  - CBCs with diff daily    ENDOCRINE:  - No active issues    SKIN:  - Has 2+ pitting edema of the feet likely due to immobility  - Few blanching pressure wounds on back of feet and sacral area     DVT PPx:  - HSQ Q8H    Code Status: Full Ms. Ponce is a 54F with a PMH of cerebral palsy and seizure disorder presenting from group home for acute AMS and increased work of breathing with recent seizure yesterday found to be hypothermic, hypotensive and bradycardic with leukocytosis and imaging showing new right lung opacification concerning for septic shock 2/2 to aspiration PNA. Admitted to MICU for further management.    NEURO: Patient has a history of cerebral palsy and seizure disorder. Baseline mental status minimally verbal but interactive/alert and withdraws to pain. Neurology on board. Lethargic currently 2/2 to metabolic encephalopathy due to infection.  - Intubated due to poor mental status s/p seizures concern for possible status epilepticus and hypercarbic respiratory failure  - Will treat for infection as detailed below to improve mental status   - Neurology recs appreciated  - CTH w/o contrast pending   - On propofol for sedation while intubated and seizure management titrate to RAAS -2 to -5  - Ativan 2mg PRN for seizures > 3 minutes or GTC when off sedation  - On Zyprexa 7.5 mg QD holding for now as patient is sedated  -VEEG   - C/w Depakote 1g TID and continue Keppra 1g BID as per Neuro recs    CARDIOVASCULAR: No history of cardiac disease. However found to be hypotensive and bradycardic in the setting of sepsis. Pressor support improved SBPs and bradycardia.  - On levophed gtt 1.5 will titrate with goal MAP > 65    PULMONARY: Hypothermic with lung imaging concerning for new right lung opacification. On POCUS see signs of consolidation likely aspiration PNA due to seizure yesterday.  - S/p intubation in MICU for poor mental status and inability to maintain airway, as well as hypercarbic respiratory failure pCO2s 50s to 70s  - Vent settings  RR 16 FiO2 50% PEEP 5  - When plan to extubate will need steroids to minimize vocal cord edema  - Treat for aspiration PNA with Vancomycin (MRSA coverage for recent hospitalizations) and Zosyn until sputum cultures return  - Aspiration precautions   -Repeat ABG improved   -F/u sputum Cx     GI: Patient has a history of enteritis in the past but no GI symptoms currently. Eats pureed foods.  - Keep NPO  - OGT feeding initiated   - Aspiration precautions     RENAL:   - Cazares in place with clear urine output  - Replete electrolytes PRN    INFECTIOUS DISEASE: New right lung opacification with hypothermia and leukocytosis meeting SIRS criteria with presumable source aspiration PNA complicated by hypotension 2/2 to septic shock. UA negative.   - As mentioned above Zosyn and Vancomycin for now will de-escalate as clinically improves and blood cultures results return  - Obtain sputum cultures with suctioning of ETT  - If patient remains on high doses of pressor support will consider CT chest/abdomen/pelvis to rule out occult source of infection given also has a recent history of enteritis requiring MICU admission  - Vanc level 18.6  - Follow up blood cultures  -F/U Legionella   - CBCs with diff daily     HEMATOLOGY: has thrombocytopenia but has had thrombocytopenia in the past could be 2/2 to infection  - CBCs with diff daily    ENDOCRINE:  - No active issues    SKIN:  - Has 2+ pitting edema of the feet likely due to immobility  - Few blanching pressure wounds on back of feet and sacral area     DVT PPx:  - HSQ Q8H    Code Status: Full Ms. Ponce is a 54F with a PMH of cerebral palsy and seizure disorder presenting from group home for acute AMS and increased work of breathing with recent seizure yesterday found to be hypothermic, hypotensive and bradycardic with leukocytosis and imaging showing new right lung opacification concerning for septic shock 2/2 to aspiration PNA. Admitted to MICU for further management.    NEURO: Patient has a history of cerebral palsy and seizure disorder. Baseline mental status minimally verbal but interactive/alert and withdraws to pain. Neurology on board. Lethargic currently 2/2 to metabolic encephalopathy due to infection.  - Intubated due to poor mental status s/p seizures concern for possible status epilepticus and hypercarbic respiratory failure  - Will treat for infection as detailed below to improve mental status   - Neurology recs appreciated  - CTH w/o contrast pending   - On propofol for sedation while intubated and seizure management titrate to RAAS -2 to -5  - Ativan 2mg PRN for seizures > 3 minutes or GTC when off sedation  - On Zyprexa 7.5 mg QD holding for now as patient is sedated  -VEEG   - C/w Depakote 1g TID and continue Keppra 1g BID as per Neuro recs    CARDIOVASCULAR: No history of cardiac disease. However found to be hypotensive and bradycardic in the setting of sepsis. Pressor support improved SBPs and bradycardia.  - On levophed gtt 1.5 will titrate with goal MAP > 65    PULMONARY: Hypothermic with lung imaging concerning for new right lung opacification. On POCUS see signs of consolidation likely aspiration PNA due to seizure yesterday.  - S/p intubation in MICU for poor mental status and inability to maintain airway, as well as hypercarbic respiratory failure pCO2s 50s to 70s  - Vent settings  RR 16 FiO2 50% PEEP 5  - When plan to extubate will need steroids to minimize vocal cord edema  - Treat for aspiration PNA with Vancomycin (MRSA coverage for recent hospitalizations) and Zosyn until sputum cultures return  - Aspiration precautions   -Repeat ABG improved   -F/u sputum Cx     GI: Patient has a history of enteritis in the past but no GI symptoms currently. Eats pureed foods.  - Keep NPO  - OGT feeding initiated   - Aspiration precautions     RENAL:   - Purewick catheter in place with clear urine output  - Replete electrolytes PRN    INFECTIOUS DISEASE: New right lung opacification with hypothermia and leukocytosis meeting SIRS criteria with presumable source aspiration PNA complicated by hypotension 2/2 to septic shock. UA negative.   - As mentioned above Zosyn and Vancomycin for now will de-escalate as clinically improves and blood cultures results return  - Obtain sputum cultures with suctioning of ETT  - If patient remains on high doses of pressor support will consider CT chest/abdomen/pelvis to rule out occult source of infection given also has a recent history of enteritis requiring MICU admission  - Vanc level 18.6  - Follow up blood cultures  -F/U Legionella   - CBCs with diff daily     HEMATOLOGY: has thrombocytopenia but has had thrombocytopenia in the past could be 2/2 to infection  - CBCs with diff daily    ENDOCRINE:  - No active issues    SKIN:  - Has 2+ pitting edema of the feet likely due to immobility  - Few blanching pressure wounds on back of feet and sacral area     DVT PPx:  - HSQ Q8H    Code Status: Full

## 2019-08-21 LAB
ALBUMIN SERPL ELPH-MCNC: 2.5 G/DL — LOW (ref 3.3–5)
ALP SERPL-CCNC: 54 U/L — SIGNIFICANT CHANGE UP (ref 40–120)
ALT FLD-CCNC: 13 U/L — SIGNIFICANT CHANGE UP (ref 4–33)
ANION GAP SERPL CALC-SCNC: 14 MMO/L — SIGNIFICANT CHANGE UP (ref 7–14)
AST SERPL-CCNC: 20 U/L — SIGNIFICANT CHANGE UP (ref 4–32)
BACTERIA SPT RESP CULT: SIGNIFICANT CHANGE UP
BASOPHILS # BLD AUTO: 0.02 K/UL — SIGNIFICANT CHANGE UP (ref 0–0.2)
BASOPHILS NFR BLD AUTO: 0.3 % — SIGNIFICANT CHANGE UP (ref 0–2)
BILIRUB SERPL-MCNC: 0.2 MG/DL — SIGNIFICANT CHANGE UP (ref 0.2–1.2)
BUN SERPL-MCNC: 11 MG/DL — SIGNIFICANT CHANGE UP (ref 7–23)
CALCIUM SERPL-MCNC: 8.4 MG/DL — SIGNIFICANT CHANGE UP (ref 8.4–10.5)
CHLORIDE SERPL-SCNC: 112 MMOL/L — HIGH (ref 98–107)
CO2 SERPL-SCNC: 22 MMOL/L — SIGNIFICANT CHANGE UP (ref 22–31)
CREAT SERPL-MCNC: 0.72 MG/DL — SIGNIFICANT CHANGE UP (ref 0.5–1.3)
EOSINOPHIL # BLD AUTO: 0.22 K/UL — SIGNIFICANT CHANGE UP (ref 0–0.5)
EOSINOPHIL NFR BLD AUTO: 2.8 % — SIGNIFICANT CHANGE UP (ref 0–6)
GLUCOSE SERPL-MCNC: 86 MG/DL — SIGNIFICANT CHANGE UP (ref 70–99)
HCT VFR BLD CALC: 34.2 % — LOW (ref 34.5–45)
HGB BLD-MCNC: 10.8 G/DL — LOW (ref 11.5–15.5)
IMM GRANULOCYTES NFR BLD AUTO: 0.5 % — SIGNIFICANT CHANGE UP (ref 0–1.5)
LYMPHOCYTES # BLD AUTO: 2.88 K/UL — SIGNIFICANT CHANGE UP (ref 1–3.3)
LYMPHOCYTES # BLD AUTO: 36.8 % — SIGNIFICANT CHANGE UP (ref 13–44)
MAGNESIUM SERPL-MCNC: 2.1 MG/DL — SIGNIFICANT CHANGE UP (ref 1.6–2.6)
MCHC RBC-ENTMCNC: 31.6 % — LOW (ref 32–36)
MCHC RBC-ENTMCNC: 33.1 PG — SIGNIFICANT CHANGE UP (ref 27–34)
MCV RBC AUTO: 104.9 FL — HIGH (ref 80–100)
MONOCYTES # BLD AUTO: 0.65 K/UL — SIGNIFICANT CHANGE UP (ref 0–0.9)
MONOCYTES NFR BLD AUTO: 8.3 % — SIGNIFICANT CHANGE UP (ref 2–14)
NEUTROPHILS # BLD AUTO: 4.02 K/UL — SIGNIFICANT CHANGE UP (ref 1.8–7.4)
NEUTROPHILS NFR BLD AUTO: 51.3 % — SIGNIFICANT CHANGE UP (ref 43–77)
NRBC # FLD: 0.06 K/UL — SIGNIFICANT CHANGE UP (ref 0–0)
PHOSPHATE SERPL-MCNC: 4.9 MG/DL — HIGH (ref 2.5–4.5)
PLATELET # BLD AUTO: 83 K/UL — LOW (ref 150–400)
PMV BLD: 11.3 FL — SIGNIFICANT CHANGE UP (ref 7–13)
POTASSIUM SERPL-MCNC: 4 MMOL/L — SIGNIFICANT CHANGE UP (ref 3.5–5.3)
POTASSIUM SERPL-SCNC: 4 MMOL/L — SIGNIFICANT CHANGE UP (ref 3.5–5.3)
PROT SERPL-MCNC: 6.2 G/DL — SIGNIFICANT CHANGE UP (ref 6–8.3)
RBC # BLD: 3.26 M/UL — LOW (ref 3.8–5.2)
RBC # FLD: 16.7 % — HIGH (ref 10.3–14.5)
SODIUM SERPL-SCNC: 148 MMOL/L — HIGH (ref 135–145)
WBC # BLD: 7.83 K/UL — SIGNIFICANT CHANGE UP (ref 3.8–10.5)
WBC # FLD AUTO: 7.83 K/UL — SIGNIFICANT CHANGE UP (ref 3.8–10.5)

## 2019-08-21 PROCEDURE — 99291 CRITICAL CARE FIRST HOUR: CPT | Mod: 25

## 2019-08-21 RX ADMIN — LEVETIRACETAM 400 MILLIGRAM(S): 250 TABLET, FILM COATED ORAL at 05:34

## 2019-08-21 RX ADMIN — PROPOFOL 4.1 MICROGRAM(S)/KG/MIN: 10 INJECTION, EMULSION INTRAVENOUS at 19:44

## 2019-08-21 RX ADMIN — Medication 30 MILLIGRAM(S): at 08:12

## 2019-08-21 RX ADMIN — PIPERACILLIN AND TAZOBACTAM 25 GRAM(S): 4; .5 INJECTION, POWDER, LYOPHILIZED, FOR SOLUTION INTRAVENOUS at 09:48

## 2019-08-21 RX ADMIN — HEPARIN SODIUM 5000 UNIT(S): 5000 INJECTION INTRAVENOUS; SUBCUTANEOUS at 21:41

## 2019-08-21 RX ADMIN — Medication 1 APPLICATION(S): at 17:49

## 2019-08-21 RX ADMIN — CHLORHEXIDINE GLUCONATE 1 APPLICATION(S): 213 SOLUTION TOPICAL at 08:05

## 2019-08-21 RX ADMIN — Medication 30 MILLIGRAM(S): at 23:57

## 2019-08-21 RX ADMIN — CHLORHEXIDINE GLUCONATE 15 MILLILITER(S): 213 SOLUTION TOPICAL at 05:34

## 2019-08-21 RX ADMIN — PROPOFOL 4.1 MICROGRAM(S)/KG/MIN: 10 INJECTION, EMULSION INTRAVENOUS at 08:05

## 2019-08-21 RX ADMIN — Medication 30 MILLIGRAM(S): at 00:09

## 2019-08-21 RX ADMIN — PIPERACILLIN AND TAZOBACTAM 25 GRAM(S): 4; .5 INJECTION, POWDER, LYOPHILIZED, FOR SOLUTION INTRAVENOUS at 01:04

## 2019-08-21 RX ADMIN — HEPARIN SODIUM 5000 UNIT(S): 5000 INJECTION INTRAVENOUS; SUBCUTANEOUS at 14:08

## 2019-08-21 RX ADMIN — HEPARIN SODIUM 5000 UNIT(S): 5000 INJECTION INTRAVENOUS; SUBCUTANEOUS at 05:34

## 2019-08-21 RX ADMIN — LEVETIRACETAM 400 MILLIGRAM(S): 250 TABLET, FILM COATED ORAL at 17:50

## 2019-08-21 RX ADMIN — CHLORHEXIDINE GLUCONATE 15 MILLILITER(S): 213 SOLUTION TOPICAL at 17:49

## 2019-08-21 RX ADMIN — Medication 30 MILLIGRAM(S): at 15:40

## 2019-08-21 RX ADMIN — PIPERACILLIN AND TAZOBACTAM 25 GRAM(S): 4; .5 INJECTION, POWDER, LYOPHILIZED, FOR SOLUTION INTRAVENOUS at 17:50

## 2019-08-21 RX ADMIN — Medication 1 APPLICATION(S): at 05:34

## 2019-08-21 NOTE — PROGRESS NOTE ADULT - ATTENDING COMMENTS
Patient with septic shock due to aspiration pneumonia and seizures, intubated for acute hypoxic respiratory failure, on zosyn for gram negative organisms in setting of aspiration.  c/w current AEDs, try to wean off propofol and do PS trials.  Start free water for hypernatremia.

## 2019-08-21 NOTE — PROGRESS NOTE ADULT - SUBJECTIVE AND OBJECTIVE BOX
Patient is a 54y old  Female who presents with a chief complaint of Septic Shock (20 Aug 2019 06:04)      SUBJECTIVE / OVERNIGHT EVENTS:    Patient seen and examined at bedside. No acute events overnight.    Review of systems:     MEDICATIONS  (STANDING):  chlorhexidine 0.12% Liquid 15 milliLiter(s) Oral Mucosa every 12 hours  chlorhexidine 4% Liquid 1 Application(s) Topical <User Schedule>  heparin  Injectable 5000 Unit(s) SubCutaneous every 8 hours  levETIRAcetam  IVPB 1000 milliGRAM(s) IV Intermittent every 12 hours  norepinephrine Infusion 0.1 MICROgram(s)/kG/Min (12.806 mL/Hr) IV Continuous <Continuous>  petrolatum Ophthalmic Ointment 1 Application(s) Both EYES two times a day  piperacillin/tazobactam IVPB.. 3.375 Gram(s) IV Intermittent every 8 hours  propofol Infusion 10 MICROgram(s)/kG/Min (4.098 mL/Hr) IV Continuous <Continuous>  valproate sodium IVPB 1000 milliGRAM(s) IV Intermittent every 8 hours    MEDICATIONS  (PRN):  LORazepam   Injectable 2 milliGRAM(s) IV Push every 6 hours PRN Seizure > 3 minutes or GTC      T(F): 98.2 (08-21 @ 04:00), Max: 98.5 (08-21 @ 00:00)  HR: 37 (08-21 @ 06:00) (37 - 74)  BP: 139/65 (08-21 @ 06:00) (81/56 - 140/69)  RR: 12 (08-21 @ 06:00) (12 - 23)  SpO2: 100% (08-21 @ 06:00) (94% - 100%)  CAPILLARY BLOOD GLUCOSE        I&O's Summary    19 Aug 2019 07:01  -  20 Aug 2019 07:00  --------------------------------------------------------  IN: 2372.3 mL / OUT: 1420 mL / NET: 952.3 mL    20 Aug 2019 07:01  -  21 Aug 2019 06:51  --------------------------------------------------------  IN: 2043.3 mL / OUT: 1150 mL / NET: 893.3 mL        PHYSICAL EXAM:        LABS:  Labs personally reviewed.                        10.8   7.83  )-----------( 83       ( 21 Aug 2019 02:55 )             34.2     Hgb Trend: 10.8<--, 10.0<--, 10.3<--, 11.0<--, 12.4<--  08-21    148<H>  |  112<H>  |  11  ----------------------------<  86  4.0   |  22  |  0.72    Ca    8.4      21 Aug 2019 02:55  Phos  4.9     08-21  Mg     2.1     08-21    TPro  6.2  /  Alb  2.5<L>  /  TBili  0.2  /  DBili  x   /  AST  20  /  ALT  13  /  AlkPhos  54  08-21    Creatinine Trend: 0.72<--, 0.62<--, 0.48<--, 0.44<--, 0.57<-- Patient is a 54y old  Female who presents with a chief complaint of Septic Shock (20 Aug 2019 06:04)      SUBJECTIVE / OVERNIGHT EVENTS:    Patient seen and examined at bedside. Overnight, patient was desaturating due to tube disconnection, patient's O2 sat improved after tube reconnected.      Review of systems: Cannot assess, as patient is intubated and sedated.     MEDICATIONS  (STANDING):  chlorhexidine 0.12% Liquid 15 milliLiter(s) Oral Mucosa every 12 hours  chlorhexidine 4% Liquid 1 Application(s) Topical <User Schedule>  heparin  Injectable 5000 Unit(s) SubCutaneous every 8 hours  levETIRAcetam  IVPB 1000 milliGRAM(s) IV Intermittent every 12 hours  norepinephrine Infusion 0.1 MICROgram(s)/kG/Min (12.806 mL/Hr) IV Continuous <Continuous>  petrolatum Ophthalmic Ointment 1 Application(s) Both EYES two times a day  piperacillin/tazobactam IVPB.. 3.375 Gram(s) IV Intermittent every 8 hours  propofol Infusion 10 MICROgram(s)/kG/Min (4.098 mL/Hr) IV Continuous <Continuous>  valproate sodium IVPB 1000 milliGRAM(s) IV Intermittent every 8 hours    MEDICATIONS  (PRN):  LORazepam   Injectable 2 milliGRAM(s) IV Push every 6 hours PRN Seizure > 3 minutes or GTC      T(F): 98.2 (08-21 @ 04:00), Max: 98.5 (08-21 @ 00:00)  HR: 37 (08-21 @ 06:00) (37 - 74)  BP: 139/65 (08-21 @ 06:00) (81/56 - 140/69)  RR: 12 (08-21 @ 06:00) (12 - 23)  SpO2: 100% (08-21 @ 06:00) (94% - 100%)  CAPILLARY BLOOD GLUCOSE        I&O's Summary    19 Aug 2019 07:01  -  20 Aug 2019 07:00  --------------------------------------------------------  IN: 2372.3 mL / OUT: 1420 mL / NET: 952.3 mL    20 Aug 2019 07:01  -  21 Aug 2019 06:51  --------------------------------------------------------  IN: 2043.3 mL / OUT: 1150 mL / NET: 893.3 mL        PHYSICAL EXAM:  HEAD: Atraumatic, Normocephalic  	EYES: PERRL, conjunctiva and sclera clear  	ENMT: Moist mucous membranes. Poor dentition.  	NECK: Supple  	NERVOUS SYSTEM: Pt sedated, withdraws to pain  	CHEST/LUNG: Decreased breath sounds bilaterally  	HEART: Regular rate and rhythm; S1 and S2; No murmurs/no rubs/no gallops  	ABDOMEN: Soft, Nondistended: Bowel sounds present  	EXTREMITIES: 2+ Peripheral Pulses. 2+ pitting edema of the feet   	SKIN: Few blanching pressure wounds on back of feet and sacral area, +ecchymosis on forearm and dorsum of hand     LABS:  Labs personally reviewed.                        10.8   7.83  )-----------( 83       ( 21 Aug 2019 02:55 )             34.2     Hgb Trend: 10.8<--, 10.0<--, 10.3<--, 11.0<--, 12.4<--  08-21    148<H>  |  112<H>  |  11  ----------------------------<  86  4.0   |  22  |  0.72    Ca    8.4      21 Aug 2019 02:55  Phos  4.9     08-21  Mg     2.1     08-21    TPro  6.2  /  Alb  2.5<L>  /  TBili  0.2  /  DBili  x   /  AST  20  /  ALT  13  /  AlkPhos  54  08-21    Creatinine Trend: 0.72<--, 0.62<--, 0.48<--, 0.44<--, 0.57<--

## 2019-08-21 NOTE — PROGRESS NOTE ADULT - ASSESSMENT
Ms. Ponce is a 54F with a PMH of cerebral palsy and seizure disorder presenting from group home for acute AMS and increased work of breathing with recent seizure yesterday found to be hypothermic, hypotensive and bradycardic with leukocytosis and imaging showing new right lung opacification concerning for septic shock 2/2 to aspiration PNA. Admitted to MICU for further management.    NEURO: Patient has a history of cerebral palsy and seizure disorder. Baseline mental status minimally verbal but interactive/alert and withdraws to pain. Neurology on board. Lethargic currently 2/2 to metabolic encephalopathy due to infection.  - Intubated due to poor mental status s/p seizures concern for possible status epilepticus and hypercarbic respiratory failure  - Will treat for infection as detailed below to improve mental status   - Neurology recs appreciated  - CTH w/o contrast pending   - On propofol for sedation while intubated and seizure management titrate to RAAS -2 to -5  - Ativan 2mg PRN for seizures > 3 minutes or GTC when off sedation  - On Zyprexa 7.5 mg QD holding for now as patient is sedated  -VEEG   - C/w Depakote 1g TID and continue Keppra 1g BID as per Neuro recs    CARDIOVASCULAR: No history of cardiac disease. However found to be hypotensive and bradycardic in the setting of sepsis. Pressor support improved SBPs and bradycardia.  - On levophed gtt 1.5 will titrate with goal MAP > 65    PULMONARY: Hypothermic with lung imaging concerning for new right lung opacification. On POCUS see signs of consolidation likely aspiration PNA due to seizure yesterday.  - S/p intubation in MICU for poor mental status and inability to maintain airway, as well as hypercarbic respiratory failure pCO2s 50s to 70s  - Vent settings  RR 16 FiO2 50% PEEP 5  - When plan to extubate will need steroids to minimize vocal cord edema  - Treat for aspiration PNA with Vancomycin (MRSA coverage for recent hospitalizations) and Zosyn until sputum cultures return  - Aspiration precautions   -Repeat ABG improved   -F/u sputum Cx     GI: Patient has a history of enteritis in the past but no GI symptoms currently. Eats pureed foods.  - Keep NPO  - OGT feeding initiated   - Aspiration precautions     RENAL:   - Purewick catheter in place with clear urine output  - Replete electrolytes PRN    INFECTIOUS DISEASE: New right lung opacification with hypothermia and leukocytosis meeting SIRS criteria with presumable source aspiration PNA complicated by hypotension 2/2 to septic shock. UA negative.   - As mentioned above Zosyn and Vancomycin for now will de-escalate as clinically improves and blood cultures results return  - Obtain sputum cultures with suctioning of ETT  - If patient remains on high doses of pressor support will consider CT chest/abdomen/pelvis to rule out occult source of infection given also has a recent history of enteritis requiring MICU admission  - Vanc level 18.6  - Follow up blood cultures  -F/U Legionella   - CBCs with diff daily     HEMATOLOGY: has thrombocytopenia but has had thrombocytopenia in the past could be 2/2 to infection  - CBCs with diff daily    ENDOCRINE:  - No active issues    SKIN:  - Has 2+ pitting edema of the feet likely due to immobility  - Few blanching pressure wounds on back of feet and sacral area     DVT PPx:  - HSQ Q8H    Code Status: Full Ms. Ponce is a 54F with a PMH of cerebral palsy and seizure disorder presenting from group home for acute AMS and increased work of breathing with recent seizure yesterday found to be hypothermic, hypotensive and bradycardic with leukocytosis and imaging showing new right lung opacification concerning for septic shock 2/2 to aspiration PNA. Admitted to MICU for further management.    NEURO: Patient has a history of cerebral palsy and seizure disorder. Baseline mental status minimally verbal but interactive/alert and withdraws to pain. Neurology on board. Lethargic currently 2/2 to metabolic encephalopathy due to infection.  - Intubated due to poor mental status s/p seizures concern for possible status epilepticus and hypercarbic respiratory failure  - Will treat for infection as detailed below to improve mental status   - Neurology recs appreciated  - CTH w/o contrast pending   - On propofol for sedation while intubated and seizure management titrate to RAAS -2 to -5  - Ativan 2mg PRN for seizures > 3 minutes or GTC when off sedation  - On Zyprexa 7.5 mg QD holding for now as patient is sedated  -Potential epileptogenic focus in the right fronto-temporal region shown on VEEG  - C/w Depakote 1g TID and continue Keppra 1g BID as per Neuro recs      CARDIOVASCULAR: No history of cardiac disease. However found to be hypotensive and bradycardic in the setting of sepsis. Pressor support improved SBPs and bradycardia.  - On levophed gtt 1.5 will titrate with goal MAP > 65    PULMONARY: Hypothermic with lung imaging concerning for new right lung opacification. On POCUS see signs of consolidation likely aspiration PNA due to seizure yesterday.  - S/p intubation in MICU for poor mental status and inability to maintain airway, as well as hypercarbic respiratory failure pCO2s 50s to 70s  - Vent settings  RR 16 FiO2 50% PEEP 5  - When plan to extubate will need steroids to minimize vocal cord edema  - Aspiration precautions   -Repeat ABG improved   -F/u sputum Cx   -Patient failed CPAP trial, will continue pt on sedation, and try again tomorrow     GI: Patient has a history of enteritis in the past but no GI symptoms currently. Eats pureed foods.  - OGT feeding initiated   - Aspiration precautions     RENAL:   - Purewick catheter in place with clear urine output  - Replete electrolytes PRN    INFECTIOUS DISEASE: New right lung opacification with hypothermia and leukocytosis meeting SIRS criteria with presumable source aspiration PNA complicated by hypotension 2/2 to septic shock. UA negative.   - If patient remains on high doses of pressor support will consider CT chest/abdomen/pelvis to rule out occult source of infection given also has a recent history of enteritis requiring MICU admission  - Follow up blood cultures  -D/c'd vancomycin, will continue on zosyn for aspiration pneumonia   -Legionella negative  - CBCs with diff daily   -Sputum cultures showed no growth for 24h    HEMATOLOGY: has thrombocytopenia but has had thrombocytopenia in the past could be 2/2 to infection  - CBCs with diff daily    ENDOCRINE:  - No active issues    SKIN:  - Has 2+ pitting edema of the feet likely due to immobility  - Few blanching pressure wounds on back of feet and sacral area     DVT PPx:  - HSQ Q8H    Code Status: Full

## 2019-08-22 LAB
ALBUMIN SERPL ELPH-MCNC: 2.3 G/DL — LOW (ref 3.3–5)
ALP SERPL-CCNC: 51 U/L — SIGNIFICANT CHANGE UP (ref 40–120)
ALT FLD-CCNC: 13 U/L — SIGNIFICANT CHANGE UP (ref 4–33)
ANION GAP SERPL CALC-SCNC: 12 MMO/L — SIGNIFICANT CHANGE UP (ref 7–14)
AST SERPL-CCNC: 18 U/L — SIGNIFICANT CHANGE UP (ref 4–32)
BASOPHILS # BLD AUTO: 0.01 K/UL — SIGNIFICANT CHANGE UP (ref 0–0.2)
BASOPHILS NFR BLD AUTO: 0.2 % — SIGNIFICANT CHANGE UP (ref 0–2)
BILIRUB SERPL-MCNC: < 0.2 MG/DL — LOW (ref 0.2–1.2)
BUN SERPL-MCNC: 13 MG/DL — SIGNIFICANT CHANGE UP (ref 7–23)
CALCIUM SERPL-MCNC: 8.1 MG/DL — LOW (ref 8.4–10.5)
CHLORIDE SERPL-SCNC: 111 MMOL/L — HIGH (ref 98–107)
CO2 SERPL-SCNC: 23 MMOL/L — SIGNIFICANT CHANGE UP (ref 22–31)
CREAT SERPL-MCNC: 0.77 MG/DL — SIGNIFICANT CHANGE UP (ref 0.5–1.3)
EOSINOPHIL # BLD AUTO: 0.38 K/UL — SIGNIFICANT CHANGE UP (ref 0–0.5)
EOSINOPHIL NFR BLD AUTO: 7.4 % — HIGH (ref 0–6)
GLUCOSE SERPL-MCNC: 85 MG/DL — SIGNIFICANT CHANGE UP (ref 70–99)
HCT VFR BLD CALC: 30.3 % — LOW (ref 34.5–45)
HGB BLD-MCNC: 9.5 G/DL — LOW (ref 11.5–15.5)
IMM GRANULOCYTES NFR BLD AUTO: 1.4 % — SIGNIFICANT CHANGE UP (ref 0–1.5)
LYMPHOCYTES # BLD AUTO: 2.03 K/UL — SIGNIFICANT CHANGE UP (ref 1–3.3)
LYMPHOCYTES # BLD AUTO: 39.4 % — SIGNIFICANT CHANGE UP (ref 13–44)
MAGNESIUM SERPL-MCNC: 2 MG/DL — SIGNIFICANT CHANGE UP (ref 1.6–2.6)
MCHC RBC-ENTMCNC: 31.4 % — LOW (ref 32–36)
MCHC RBC-ENTMCNC: 33 PG — SIGNIFICANT CHANGE UP (ref 27–34)
MCV RBC AUTO: 105.2 FL — HIGH (ref 80–100)
MONOCYTES # BLD AUTO: 0.58 K/UL — SIGNIFICANT CHANGE UP (ref 0–0.9)
MONOCYTES NFR BLD AUTO: 11.3 % — SIGNIFICANT CHANGE UP (ref 2–14)
NEUTROPHILS # BLD AUTO: 2.08 K/UL — SIGNIFICANT CHANGE UP (ref 1.8–7.4)
NEUTROPHILS NFR BLD AUTO: 40.3 % — LOW (ref 43–77)
NRBC # FLD: 0.05 K/UL — SIGNIFICANT CHANGE UP (ref 0–0)
NRBC FLD-RTO: 1 — SIGNIFICANT CHANGE UP
PHOSPHATE SERPL-MCNC: 5.7 MG/DL — HIGH (ref 2.5–4.5)
PLATELET # BLD AUTO: 68 K/UL — LOW (ref 150–400)
PMV BLD: 11.6 FL — SIGNIFICANT CHANGE UP (ref 7–13)
POTASSIUM SERPL-MCNC: 3.7 MMOL/L — SIGNIFICANT CHANGE UP (ref 3.5–5.3)
POTASSIUM SERPL-SCNC: 3.7 MMOL/L — SIGNIFICANT CHANGE UP (ref 3.5–5.3)
PROT SERPL-MCNC: 5.7 G/DL — LOW (ref 6–8.3)
RBC # BLD: 2.88 M/UL — LOW (ref 3.8–5.2)
RBC # FLD: 16.5 % — HIGH (ref 10.3–14.5)
SODIUM SERPL-SCNC: 146 MMOL/L — HIGH (ref 135–145)
WBC # BLD: 5.15 K/UL — SIGNIFICANT CHANGE UP (ref 3.8–10.5)
WBC # FLD AUTO: 5.15 K/UL — SIGNIFICANT CHANGE UP (ref 3.8–10.5)

## 2019-08-22 PROCEDURE — 99291 CRITICAL CARE FIRST HOUR: CPT | Mod: 25

## 2019-08-22 PROCEDURE — 71045 X-RAY EXAM CHEST 1 VIEW: CPT | Mod: 26

## 2019-08-22 RX ORDER — LEVETIRACETAM 250 MG/1
1000 TABLET, FILM COATED ORAL
Refills: 0 | Status: DISCONTINUED | OUTPATIENT
Start: 2019-08-22 | End: 2019-09-18

## 2019-08-22 RX ORDER — VALPROIC ACID (AS SODIUM SALT) 250 MG/5ML
500 SOLUTION, ORAL ORAL EVERY 8 HOURS
Refills: 0 | Status: DISCONTINUED | OUTPATIENT
Start: 2019-08-22 | End: 2019-08-22

## 2019-08-22 RX ORDER — DEXMEDETOMIDINE HYDROCHLORIDE IN 0.9% SODIUM CHLORIDE 4 UG/ML
0.2 INJECTION INTRAVENOUS
Qty: 200 | Refills: 0 | Status: DISCONTINUED | OUTPATIENT
Start: 2019-08-22 | End: 2019-08-26

## 2019-08-22 RX ORDER — VALPROIC ACID (AS SODIUM SALT) 250 MG/5ML
750 SOLUTION, ORAL ORAL EVERY 8 HOURS
Refills: 0 | Status: DISCONTINUED | OUTPATIENT
Start: 2019-08-22 | End: 2019-09-18

## 2019-08-22 RX ORDER — LEVETIRACETAM 250 MG/1
1000 TABLET, FILM COATED ORAL EVERY 12 HOURS
Refills: 0 | Status: DISCONTINUED | OUTPATIENT
Start: 2019-08-22 | End: 2019-08-22

## 2019-08-22 RX ORDER — DEXMEDETOMIDINE HYDROCHLORIDE IN 0.9% SODIUM CHLORIDE 4 UG/ML
0.5 INJECTION INTRAVENOUS
Qty: 200 | Refills: 0 | Status: DISCONTINUED | OUTPATIENT
Start: 2019-08-22 | End: 2019-08-22

## 2019-08-22 RX ADMIN — Medication 1 APPLICATION(S): at 18:44

## 2019-08-22 RX ADMIN — LEVETIRACETAM 400 MILLIGRAM(S): 250 TABLET, FILM COATED ORAL at 13:47

## 2019-08-22 RX ADMIN — LEVETIRACETAM 400 MILLIGRAM(S): 250 TABLET, FILM COATED ORAL at 19:37

## 2019-08-22 RX ADMIN — LEVETIRACETAM 400 MILLIGRAM(S): 250 TABLET, FILM COATED ORAL at 05:49

## 2019-08-22 RX ADMIN — PROPOFOL 4.1 MICROGRAM(S)/KG/MIN: 10 INJECTION, EMULSION INTRAVENOUS at 05:50

## 2019-08-22 RX ADMIN — CHLORHEXIDINE GLUCONATE 1 APPLICATION(S): 213 SOLUTION TOPICAL at 06:06

## 2019-08-22 RX ADMIN — PIPERACILLIN AND TAZOBACTAM 25 GRAM(S): 4; .5 INJECTION, POWDER, LYOPHILIZED, FOR SOLUTION INTRAVENOUS at 10:45

## 2019-08-22 RX ADMIN — CHLORHEXIDINE GLUCONATE 15 MILLILITER(S): 213 SOLUTION TOPICAL at 18:44

## 2019-08-22 RX ADMIN — PROPOFOL 4.1 MICROGRAM(S)/KG/MIN: 10 INJECTION, EMULSION INTRAVENOUS at 08:28

## 2019-08-22 RX ADMIN — PIPERACILLIN AND TAZOBACTAM 25 GRAM(S): 4; .5 INJECTION, POWDER, LYOPHILIZED, FOR SOLUTION INTRAVENOUS at 18:43

## 2019-08-22 RX ADMIN — DEXMEDETOMIDINE HYDROCHLORIDE IN 0.9% SODIUM CHLORIDE 3.42 MICROGRAM(S)/KG/HR: 4 INJECTION INTRAVENOUS at 19:37

## 2019-08-22 RX ADMIN — Medication 30 MILLIGRAM(S): at 08:28

## 2019-08-22 RX ADMIN — PIPERACILLIN AND TAZOBACTAM 25 GRAM(S): 4; .5 INJECTION, POWDER, LYOPHILIZED, FOR SOLUTION INTRAVENOUS at 01:59

## 2019-08-22 RX ADMIN — CHLORHEXIDINE GLUCONATE 15 MILLILITER(S): 213 SOLUTION TOPICAL at 05:49

## 2019-08-22 RX ADMIN — Medication 1 APPLICATION(S): at 05:49

## 2019-08-22 RX ADMIN — DEXMEDETOMIDINE HYDROCHLORIDE IN 0.9% SODIUM CHLORIDE 3.42 MICROGRAM(S)/KG/HR: 4 INJECTION INTRAVENOUS at 10:46

## 2019-08-22 RX ADMIN — Medication 28.75 MILLIGRAM(S): at 15:39

## 2019-08-22 NOTE — PROGRESS NOTE ADULT - ASSESSMENT
Ms. Ponce is a 54F with a PMH of cerebral palsy and seizure disorder presenting from group home for acute AMS and increased work of breathing with recent seizure yesterday found to be hypothermic, hypotensive and bradycardic with leukocytosis and imaging showing new right lung opacification concerning for septic shock 2/2 to aspiration PNA. Admitted to MICU for further management.    NEURO: Patient has a history of cerebral palsy and seizure disorder. Baseline mental status minimally verbal but interactive/alert and withdraws to pain. Neurology on board. Lethargic currently 2/2 to metabolic encephalopathy due to infection.  - Intubated due to poor mental status s/p seizures concern for possible status epilepticus and hypercarbic respiratory failure  - Will treat for infection as detailed below to improve mental status   - Neurology recs appreciated  - CTH w/o contrast pending   - On propofol for sedation while intubated and seizure management titrate to RAAS -2 to -5  - Ativan 2mg PRN for seizures > 3 minutes or GTC when off sedation  - On Zyprexa 7.5 mg QD holding for now as patient is sedated  -Potential epileptogenic focus in the right fronto-temporal region shown on VEEG  - C/w Depakote 1g TID and continue Keppra 1g BID as per Neuro recs      CARDIOVASCULAR: No history of cardiac disease. However found to be hypotensive and bradycardic in the setting of sepsis. Pressor support improved SBPs and bradycardia.  - On levophed gtt 1.5 will titrate with goal MAP > 65    PULMONARY: Hypothermic with lung imaging concerning for new right lung opacification. On POCUS see signs of consolidation likely aspiration PNA due to seizure yesterday.  - S/p intubation in MICU for poor mental status and inability to maintain airway, as well as hypercarbic respiratory failure pCO2s 50s to 70s  - Vent settings  RR 16 FiO2 50% PEEP 5  - When plan to extubate will need steroids to minimize vocal cord edema  - Aspiration precautions   -Repeat ABG improved   -F/u sputum Cx   -Patient failed CPAP trial, will continue pt on sedation, and try again tomorrow     GI: Patient has a history of enteritis in the past but no GI symptoms currently. Eats pureed foods.  - OGT feeding initiated   - Aspiration precautions     RENAL:   - Purewick catheter in place with clear urine output  - Replete electrolytes PRN    INFECTIOUS DISEASE: New right lung opacification with hypothermia and leukocytosis meeting SIRS criteria with presumable source aspiration PNA complicated by hypotension 2/2 to septic shock. UA negative.   - If patient remains on high doses of pressor support will consider CT chest/abdomen/pelvis to rule out occult source of infection given also has a recent history of enteritis requiring MICU admission  - Follow up blood cultures  -D/c'd vancomycin, will continue on zosyn for aspiration pneumonia   -Legionella negative  - CBCs with diff daily   -Sputum cultures showed no growth for 24h    HEMATOLOGY: has thrombocytopenia but has had thrombocytopenia in the past could be 2/2 to infection  - CBCs with diff daily    ENDOCRINE:  - No active issues    SKIN:  - Has 2+ pitting edema of the feet likely due to immobility  - Few blanching pressure wounds on back of feet and sacral area     DVT PPx:  - HSQ Q8H    Code Status: Full Ms. Ponce is a 54F with a PMH of cerebral palsy and seizure disorder presenting from group home for acute AMS and increased work of breathing with recent seizure yesterday found to be hypothermic, hypotensive and bradycardic with leukocytosis and imaging showing new right lung opacification concerning for septic shock 2/2 to aspiration PNA. Admitted to MICU for further management.    NEURO: Patient has a history of cerebral palsy and seizure disorder. Baseline mental status minimally verbal but interactive/alert and withdraws to pain. Neurology on board. Lethargic currently 2/2 to metabolic encephalopathy due to infection.  - Intubated due to poor mental status s/p seizures concern for possible status epilepticus and hypercarbic respiratory failure  - Will treat for infection as detailed below to improve mental status   - Neurology recs appreciated  - CTH w/o contrast pending   - On propofol for sedation while intubated and seizure management titrate to RAAS -2 to -5  - Ativan 2mg PRN for seizures > 3 minutes or GTC when off sedation  - On Zyprexa 7.5 mg QD holding for now as patient is sedated  -Potential epileptogenic focus in the right fronto-temporal region shown on VEEG  - C/w Depakote 1g TID and continue Keppra 1g BID as per Neuro recs  -Will try to go down on AEDs       CARDIOVASCULAR: No history of cardiac disease. However found to be hypotensive and bradycardic in the setting of sepsis. Pressor support improved SBPs and bradycardia.  -Off pressors    PULMONARY: Hypothermic with lung imaging concerning for new right lung opacification. On POCUS see signs of consolidation likely aspiration PNA due to seizure yesterday.  - S/p intubation in MICU for poor mental status and inability to maintain airway, as well as hypercarbic respiratory failure pCO2s 50s to 70s  - Vent settings  RR 16 FiO2 50% PEEP 5  - When plan to extubate will need steroids to minimize vocal cord edema  - Aspiration precautions   -Repeat ABG improved   -Sputum Cx no growth   -Wean off propofol w/ precedex on board , repeat CPAP trial     GI: Patient has a history of enteritis in the past but no GI symptoms currently. Eats pureed foods.  - OGT feeding initiated   - Aspiration precautions     RENAL:   - Purewick catheter in place with clear urine output  - Replete electrolytes PRN    INFECTIOUS DISEASE: New right lung opacification with hypothermia and leukocytosis meeting SIRS criteria with presumable source aspiration PNA complicated by hypotension 2/2 to septic shock. UA negative.   - If patient remains on high doses of pressor support will consider CT chest/abdomen/pelvis to rule out occult source of infection given also has a recent history of enteritis requiring MICU admission  - Follow up blood cultures  -D/c'd vancomycin, will continue on zosyn for aspiration pneumonia   -Legionella negative  - CBCs with diff daily   -Sputum cultures showed no growth for 24h    HEMATOLOGY: has thrombocytopenia but has had thrombocytopenia in the past could be 2/2 to infection  - CBCs with diff daily  -Worsening thrombocytopenia, d/c'd heparin for now, possible cause depkote, will try to go down on pt's AEDs    ENDOCRINE:  - No active issues    SKIN:  - Has 2+ pitting edema of the feet likely due to immobility  - Few blanching pressure wounds on back of feet and sacral area     DVT PPx:  - D/C'd heparin for worsening thrombocytopenia, SCDs only     Code Status: Full Ms. Ponce is a 54F with a PMH of cerebral palsy and seizure disorder presenting from [facility] home for acute AMS and increased work of breathing with recent seizure yesterday found to be hypothermic, hypotensive and bradycardic with leukocytosis and imaging showing new right lung opacification concerning for septic shock 2/2 to aspiration PNA. Admitted to MICU for further management.    NEURO: Patient has a history of cerebral palsy and seizure disorder. Baseline mental status minimally verbal but interactive/alert and withdraws to pain. Neurology on board. Lethargic currently 2/2 to metabolic encephalopathy due to infection.  - Intubated due to poor mental status s/p seizures concern for possible status epilepticus and hypercarbic respiratory failure  - Will treat for infection as detailed below to improve mental status   - Neurology recs appreciated  - CTH w/o contrast pending   - On propofol for sedation while intubated and seizure management titrate to RAAS -2 to -5  - Ativan 2mg PRN for seizures > 3 minutes or GTC when off sedation  - On Zyprexa 7.5 mg QD holding for now as patient is sedated  -Potential epileptogenic focus in the right fronto-temporal region shown on VEEG  - C/w Depakote 1g TID and continue Keppra 1g BID as per Neuro recs  -Pt previously on Depakote 500mg q8h, will decrease current dose of 100q8h to 500mg q8h      CARDIOVASCULAR: No history of cardiac disease. However found to be hypotensive and bradycardic in the setting of sepsis. Pressor support improved SBPs and bradycardia.  -Off pressors    PULMONARY: Hypothermic with lung imaging concerning for new right lung opacification. On POCUS see signs of consolidation likely aspiration PNA due to seizure yesterday.  - S/p intubation in MICU for poor mental status and inability to maintain airway, as well as hypercarbic respiratory failure pCO2s 50s to 70s  - Vent settings  RR 16 FiO2 50% PEEP 5  - When plan to extubate will need steroids to minimize vocal cord edema  - Aspiration precautions   -Repeat ABG improved   -Sputum Cx no growth   -Wean off propofol w/ precedex on board , repeat CPAP trial     GI: Patient has a history of enteritis in the past but no GI symptoms currently. Eats pureed foods.  - OGT feeding initiated   - Aspiration precautions     RENAL:   - Purewick catheter in place with clear urine output  - Replete electrolytes PRN    INFECTIOUS DISEASE: New right lung opacification with hypothermia and leukocytosis meeting SIRS criteria with presumable source aspiration PNA complicated by hypotension 2/2 to septic shock. UA negative.   - If patient remains on high doses of pressor support will consider CT chest/abdomen/pelvis to rule out occult source of infection given also has a recent history of enteritis requiring MICU admission  - Follow up blood cultures  -D/c'd vancomycin, will continue on zosyn for aspiration pneumonia   -Legionella negative  - CBCs with diff daily   -Sputum cultures showed no growth for 24h    HEMATOLOGY: has thrombocytopenia but has had thrombocytopenia in the past could be 2/2 to infection  - CBCs with diff daily  -Worsening thrombocytopenia, d/c'd heparin for now, possible cause depkote, will try to go down on pt's AEDs    ENDOCRINE:  - No active issues    SKIN:  - Has 2+ pitting edema of the feet likely due to immobility  - Few blanching pressure wounds on back of feet and sacral area     DVT PPx:  - D/C'd heparin for worsening thrombocytopenia, SCDs only     Code Status: Full Ms. Ponce is a 54F with a PMH of cerebral palsy and seizure disorder presenting from group home for acute AMS and increased work of breathing with recent seizure yesterday found to be hypothermic, hypotensive and bradycardic with leukocytosis and imaging showing new right lung opacification concerning for septic shock 2/2 to aspiration PNA. Admitted to MICU for further management.    NEURO: Patient has a history of cerebral palsy and seizure disorder. Baseline mental status minimally verbal but interactive/alert and withdraws to pain. Neurology on board. Lethargic currently 2/2 to metabolic encephalopathy due to infection.  - Intubated due to poor mental status s/p seizures concern for possible status epilepticus and hypercarbic respiratory failure  - Will treat for infection as detailed below to improve mental status   - Neurology recs appreciated  - CTH w/o contrast pending   - On propofol for sedation while intubated and seizure management titrate to RAAS -2 to -5  - Ativan 2mg PRN for seizures > 3 minutes or GTC when off sedation  - On Zyprexa 7.5 mg QD holding for now as patient is sedated  -Potential epileptogenic focus in the right fronto-temporal region shown on VEEG  - C/w Depakote 1g TID and continue Keppra 1g BID as per Neuro recs  -Spoke with Neuro in regards to pt's AEDs, will start patient on home dose Depakote 750mg TID and Keppra 1000mg TID       CARDIOVASCULAR: No history of cardiac disease. However found to be hypotensive and bradycardic in the setting of sepsis. Pressor support improved SBPs and bradycardia.  -Off pressors    PULMONARY: Hypothermic with lung imaging concerning for new right lung opacification. On POCUS see signs of consolidation likely aspiration PNA due to seizure yesterday.  - S/p intubation in MICU for poor mental status and inability to maintain airway, as well as hypercarbic respiratory failure pCO2s 50s to 70s  - Vent settings  RR 16 FiO2 50% PEEP 5  - When plan to extubate will need steroids to minimize vocal cord edema  - Aspiration precautions   -Repeat ABG improved   -Sputum Cx no growth   -Wean off propofol w/ precedex on board , repeat CPAP trial     GI: Patient has a history of enteritis in the past but no GI symptoms currently. Eats pureed foods.  - OGT feeding initiated   - Aspiration precautions     RENAL:   - Purewick catheter in place with clear urine output  - Replete electrolytes PRN    INFECTIOUS DISEASE: New right lung opacification with hypothermia and leukocytosis meeting SIRS criteria with presumable source aspiration PNA complicated by hypotension 2/2 to septic shock. UA negative.   - If patient remains on high doses of pressor support will consider CT chest/abdomen/pelvis to rule out occult source of infection given also has a recent history of enteritis requiring MICU admission  - Follow up blood cultures  -D/c'd vancomycin, will continue on zosyn for aspiration pneumonia   -Legionella negative  - CBCs with diff daily   -Sputum cultures showed no growth for 24h    HEMATOLOGY: has thrombocytopenia but has had thrombocytopenia in the past could be 2/2 to infection  - CBCs with diff daily  -Worsening thrombocytopenia, d/c'd heparin for now, possible cause depkote, will try to go down on pt's AEDs    ENDOCRINE:  - No active issues    SKIN:  - Has 2+ pitting edema of the feet likely due to immobility  - Few blanching pressure wounds on back of feet and sacral area     DVT PPx:  - D/C'd heparin for worsening thrombocytopenia, SCDs only     Code Status: Full

## 2019-08-22 NOTE — PROGRESS NOTE ADULT - ATTENDING COMMENTS
Septic shock due to aspiration pneumonia, seizures, intubated for acute hypoxic respiratory failure.  On zosyn for aspiration pna.  Will d/w neuro re: lowering her AEDs as she was sedated off propofol yesterday.  Will try PS trials again, perhaps with precedex.  On free water for hypernatremia.

## 2019-08-22 NOTE — PROGRESS NOTE ADULT - SUBJECTIVE AND OBJECTIVE BOX
Patient is a 54y old  Female who presents with a chief complaint of Septic Shock (21 Aug 2019 06:51)      SUBJECTIVE / OVERNIGHT EVENTS:    Patient seen and examined at bedside. No acute events overnight.    Review of systems: Cannot assess, pt intubated and sedated     MEDICATIONS  (STANDING):  chlorhexidine 0.12% Liquid 15 milliLiter(s) Oral Mucosa every 12 hours  chlorhexidine 4% Liquid 1 Application(s) Topical <User Schedule>  levETIRAcetam  IVPB 1000 milliGRAM(s) IV Intermittent every 12 hours  petrolatum Ophthalmic Ointment 1 Application(s) Both EYES two times a day  piperacillin/tazobactam IVPB.. 3.375 Gram(s) IV Intermittent every 8 hours  propofol Infusion 10 MICROgram(s)/kG/Min (4.098 mL/Hr) IV Continuous <Continuous>  valproate sodium IVPB 1000 milliGRAM(s) IV Intermittent every 8 hours    MEDICATIONS  (PRN):  LORazepam   Injectable 2 milliGRAM(s) IV Push every 6 hours PRN Seizure > 3 minutes or GTC      T(F): 95.4 (08-22 @ 04:00), Max: 98.5 (08-22 @ 00:00)  HR: 51 (08-22 @ 06:22) (35 - 81)  BP: 96/53 (08-22 @ 05:00) (83/47 - 112/59)  RR: 12 (08-22 @ 05:00) (12 - 18)  SpO2: 98% (08-22 @ 06:22) (95% - 100%)  CAPILLARY BLOOD GLUCOSE        I&O's Summary    20 Aug 2019 07:01  -  21 Aug 2019 07:00  --------------------------------------------------------  IN: 2043.3 mL / OUT: 1150 mL / NET: 893.3 mL    21 Aug 2019 07:01  -  22 Aug 2019 06:26  --------------------------------------------------------  IN: 2432.6 mL / OUT: 800 mL / NET: 1632.6 mL        PHYSICAL EXAM:  HEAD: Atraumatic, Normocephalic  	EYES: PERRL, conjunctiva and sclera clear  	ENMT: Moist mucous membranes. Poor dentition.  	NECK: Supple  	NERVOUS SYSTEM: Pt sedated, withdraws to pain  	CHEST/LUNG: Decreased breath sounds bilaterally  	HEART: Regular rate and rhythm; S1 and S2; No murmurs/no rubs/no gallops  	ABDOMEN: Soft, Nondistended: Bowel sounds present  	EXTREMITIES: 2+ Peripheral Pulses. 2+ pitting edema of the feet   	SKIN: Few blanching pressure wounds on back of feet and sacral area, +ecchymosis on forearm and dorsum of hand         LABS:  Labs personally reviewed.                        9.5    5.15  )-----------( 68       ( 22 Aug 2019 02:29 )             30.3     Hgb Trend: 9.5<--, 10.8<--, 10.0<--, 10.3<--, 11.0<--  08-22    146<H>  |  111<H>  |  13  ----------------------------<  85  3.7   |  23  |  0.77    Ca    8.1<L>      22 Aug 2019 02:29  Phos  5.7     08-22  Mg     2.0     08-22    TPro  5.7<L>  /  Alb  2.3<L>  /  TBili  < 0.2<L>  /  DBili  x   /  AST  18  /  ALT  13  /  AlkPhos  51  08-22    Creatinine Trend: 0.77<--, 0.72<--, 0.62<--, 0.48<--, 0.44<--, 0.57<-- Patient is a 54y old  Female who presents with a chief complaint of Septic Shock (21 Aug 2019 06:51)      SUBJECTIVE / OVERNIGHT EVENTS:    Patient seen and examined at bedside. Overnight d/c'd HSQ due to thrombocytopenia     Review of systems: Cannot assess, pt intubated and sedated     MEDICATIONS  (STANDING):  chlorhexidine 0.12% Liquid 15 milliLiter(s) Oral Mucosa every 12 hours  chlorhexidine 4% Liquid 1 Application(s) Topical <User Schedule>  levETIRAcetam  IVPB 1000 milliGRAM(s) IV Intermittent every 12 hours  petrolatum Ophthalmic Ointment 1 Application(s) Both EYES two times a day  piperacillin/tazobactam IVPB.. 3.375 Gram(s) IV Intermittent every 8 hours  propofol Infusion 10 MICROgram(s)/kG/Min (4.098 mL/Hr) IV Continuous <Continuous>  valproate sodium IVPB 1000 milliGRAM(s) IV Intermittent every 8 hours    MEDICATIONS  (PRN):  LORazepam   Injectable 2 milliGRAM(s) IV Push every 6 hours PRN Seizure > 3 minutes or GTC      T(F): 95.4 (08-22 @ 04:00), Max: 98.5 (08-22 @ 00:00)  HR: 51 (08-22 @ 06:22) (35 - 81)  BP: 96/53 (08-22 @ 05:00) (83/47 - 112/59)  RR: 12 (08-22 @ 05:00) (12 - 18)  SpO2: 98% (08-22 @ 06:22) (95% - 100%)  CAPILLARY BLOOD GLUCOSE      I&O's Summary    20 Aug 2019 07:01  -  21 Aug 2019 07:00  --------------------------------------------------------  IN: 2043.3 mL / OUT: 1150 mL / NET: 893.3 mL    21 Aug 2019 07:01  -  22 Aug 2019 06:26  --------------------------------------------------------  IN: 2432.6 mL / OUT: 800 mL / NET: 1632.6 mL      PHYSICAL EXAM:  HEAD: Atraumatic, Normocephalic  	EYES: PERRL, conjunctiva and sclera clear  	ENMT: Moist mucous membranes. Poor dentition.  	NECK: Supple  	NERVOUS SYSTEM: Pt sedated, withdraws to pain  	CHEST/LUNG: Decreased breath sounds bilaterally  	HEART: Regular rate and rhythm; S1 and S2; No murmurs/no rubs/no gallops  	ABDOMEN: Soft, Nondistended: Bowel sounds present  	EXTREMITIES: 2+ Peripheral Pulses. 2+ pitting edema of the feet   	SKIN: Few blanching pressure wounds on back of feet and sacral area, +ecchymosis on forearm and dorsum of hand     LABS:  Labs personally reviewed.                        9.5    5.15  )-----------( 68       ( 22 Aug 2019 02:29 )             30.3     Hgb Trend: 9.5<--, 10.8<--, 10.0<--, 10.3<--, 11.0<--  08-22    146<H>  |  111<H>  |  13  ----------------------------<  85  3.7   |  23  |  0.77    Ca    8.1<L>      22 Aug 2019 02:29  Phos  5.7     08-22  Mg     2.0     08-22    TPro  5.7<L>  /  Alb  2.3<L>  /  TBili  < 0.2<L>  /  DBili  x   /  AST  18  /  ALT  13  /  AlkPhos  51  08-22    Creatinine Trend: 0.77<--, 0.72<--, 0.62<--, 0.48<--, 0.44<--, 0.57<--

## 2019-08-22 NOTE — DIETITIAN INITIAL EVALUATION ADULT. - THE LAST
Confidential Drug Utilization Report  Search Terms: marta rodas, 1964   Search Date: 08/22/Ammy Bauer | Reference #: [id number]   Others' Prescriptions  Patient Name:	Marta Rodas	YOB: 1964	  Address:	[address] [place] [address]	Sex:	Female	    Rx Written	Rx Dispensed	Drug	Quantity	Days Supply	Prescriber Name			  08/21/2019	08/21/2019	oxycodone-acetaminophen 5-325 mg tab 	42	7	Geovanny Brandon MD			  12/12/2018	12/12/2018	phentermine 15 mg capsule 	14	14	Mikala Li			  11/28/2018	11/28/2018	oxycodone-acetaminophen  mg tab 	90	30	Cheri Vásquez Physician Asistant			  10/26/2018	10/27/2018	oxycodone-acetaminophen  mg tab 	90	30	Tomasa Hayden (ANP)			  09/28/2018	09/29/2018	oxycodone-acetaminophen  mg tab 	90	30	Tomasa Hayden (ANP)			  08/31/2018	08/31/2018	oxycodone-acetaminophen  mg tab 	90	30	Trey Nieves MD			    Patient Name:	Marta Rodas	YOB: 1964	  Address:	[address] [place] [address]	Sex:	Female	    Rx Written	Rx Dispensed	Drug	Quantity	Days Supply	Prescriber Name			  08/15/2019	08/16/2019	oxycodone-acetaminophen  mg tab 	90	30	Sibirceva, Kelsi			  08/01/2019	08/02/2019	oxycodone-acetaminophen  mg tab 	45	15	Sibirceva, Kelsi			  06/12/2019	06/12/2019	oxycodone-acetaminophen  mg tab 	90	30	Trey Nieves MD			  05/15/2019	05/16/2019	oxycodone-acetaminophen  mg tab 	90	30	Trey Nieves MD			  04/17/2019	04/17/2019	oxycodone-acetaminophen  mg tab 	90	30	Trey Nieves MD			  04/08/2019	04/09/2019	phentermine 15 mg capsule 	30	30	Mikala Li			  03/22/2019	03/22/2019	oxycodone-acetaminophen  mg tab 	90	30	Tomasa Hayden (ANP)			  01/23/2019	01/23/2019	oxycodone-acetaminophen  mg tab 	90	30	Cheri Vásquez Physician Asistant			  01/16/2019	01/17/2019	phentermine 15 mg capsule 	30	30	Mikala Li			  12/26/2018	12/26/2018	oxycodone-acetaminophen  mg tab 	90	30	Trey Nieves MD 2

## 2019-08-23 LAB
ANION GAP SERPL CALC-SCNC: 14 MMO/L — SIGNIFICANT CHANGE UP (ref 7–14)
BACTERIA BLD CULT: SIGNIFICANT CHANGE UP
BACTERIA BLD CULT: SIGNIFICANT CHANGE UP
BASOPHILS # BLD AUTO: 0.03 K/UL — SIGNIFICANT CHANGE UP (ref 0–0.2)
BASOPHILS NFR BLD AUTO: 0.6 % — SIGNIFICANT CHANGE UP (ref 0–2)
BUN SERPL-MCNC: 13 MG/DL — SIGNIFICANT CHANGE UP (ref 7–23)
CALCIUM SERPL-MCNC: 8.2 MG/DL — LOW (ref 8.4–10.5)
CHLORIDE SERPL-SCNC: 111 MMOL/L — HIGH (ref 98–107)
CO2 SERPL-SCNC: 22 MMOL/L — SIGNIFICANT CHANGE UP (ref 22–31)
CREAT SERPL-MCNC: 0.76 MG/DL — SIGNIFICANT CHANGE UP (ref 0.5–1.3)
EOSINOPHIL # BLD AUTO: 0.39 K/UL — SIGNIFICANT CHANGE UP (ref 0–0.5)
EOSINOPHIL NFR BLD AUTO: 7.5 % — HIGH (ref 0–6)
GLUCOSE SERPL-MCNC: 87 MG/DL — SIGNIFICANT CHANGE UP (ref 70–99)
HCT VFR BLD CALC: 35.4 % — SIGNIFICANT CHANGE UP (ref 34.5–45)
HGB BLD-MCNC: 10.7 G/DL — LOW (ref 11.5–15.5)
IMM GRANULOCYTES NFR BLD AUTO: 3.1 % — HIGH (ref 0–1.5)
LYMPHOCYTES # BLD AUTO: 1.39 K/UL — SIGNIFICANT CHANGE UP (ref 1–3.3)
LYMPHOCYTES # BLD AUTO: 26.6 % — SIGNIFICANT CHANGE UP (ref 13–44)
MAGNESIUM SERPL-MCNC: 1.9 MG/DL — SIGNIFICANT CHANGE UP (ref 1.6–2.6)
MCHC RBC-ENTMCNC: 30.2 % — LOW (ref 32–36)
MCHC RBC-ENTMCNC: 31.6 PG — SIGNIFICANT CHANGE UP (ref 27–34)
MCV RBC AUTO: 104.4 FL — HIGH (ref 80–100)
MONOCYTES # BLD AUTO: 0.63 K/UL — SIGNIFICANT CHANGE UP (ref 0–0.9)
MONOCYTES NFR BLD AUTO: 12.1 % — SIGNIFICANT CHANGE UP (ref 2–14)
NEUTROPHILS # BLD AUTO: 2.62 K/UL — SIGNIFICANT CHANGE UP (ref 1.8–7.4)
NEUTROPHILS NFR BLD AUTO: 50.1 % — SIGNIFICANT CHANGE UP (ref 43–77)
NRBC # FLD: 0.02 K/UL — SIGNIFICANT CHANGE UP (ref 0–0)
PHOSPHATE SERPL-MCNC: 5.3 MG/DL — HIGH (ref 2.5–4.5)
PLATELET # BLD AUTO: 67 K/UL — LOW (ref 150–400)
PMV BLD: 10.9 FL — SIGNIFICANT CHANGE UP (ref 7–13)
POTASSIUM SERPL-MCNC: 3.8 MMOL/L — SIGNIFICANT CHANGE UP (ref 3.5–5.3)
POTASSIUM SERPL-SCNC: 3.8 MMOL/L — SIGNIFICANT CHANGE UP (ref 3.5–5.3)
RBC # BLD: 3.39 M/UL — LOW (ref 3.8–5.2)
RBC # FLD: 16.4 % — HIGH (ref 10.3–14.5)
SODIUM SERPL-SCNC: 147 MMOL/L — HIGH (ref 135–145)
WBC # BLD: 5.22 K/UL — SIGNIFICANT CHANGE UP (ref 3.8–10.5)
WBC # FLD AUTO: 5.22 K/UL — SIGNIFICANT CHANGE UP (ref 3.8–10.5)

## 2019-08-23 PROCEDURE — 99291 CRITICAL CARE FIRST HOUR: CPT | Mod: 25

## 2019-08-23 RX ADMIN — LEVETIRACETAM 400 MILLIGRAM(S): 250 TABLET, FILM COATED ORAL at 14:17

## 2019-08-23 RX ADMIN — PIPERACILLIN AND TAZOBACTAM 25 GRAM(S): 4; .5 INJECTION, POWDER, LYOPHILIZED, FOR SOLUTION INTRAVENOUS at 10:58

## 2019-08-23 RX ADMIN — CHLORHEXIDINE GLUCONATE 1 APPLICATION(S): 213 SOLUTION TOPICAL at 06:25

## 2019-08-23 RX ADMIN — Medication 1 APPLICATION(S): at 05:45

## 2019-08-23 RX ADMIN — PIPERACILLIN AND TAZOBACTAM 25 GRAM(S): 4; .5 INJECTION, POWDER, LYOPHILIZED, FOR SOLUTION INTRAVENOUS at 02:31

## 2019-08-23 RX ADMIN — DEXMEDETOMIDINE HYDROCHLORIDE IN 0.9% SODIUM CHLORIDE 3.42 MICROGRAM(S)/KG/HR: 4 INJECTION INTRAVENOUS at 08:46

## 2019-08-23 RX ADMIN — Medication 2 MILLIGRAM(S): at 05:50

## 2019-08-23 RX ADMIN — PIPERACILLIN AND TAZOBACTAM 25 GRAM(S): 4; .5 INJECTION, POWDER, LYOPHILIZED, FOR SOLUTION INTRAVENOUS at 17:28

## 2019-08-23 RX ADMIN — Medication 28.75 MILLIGRAM(S): at 08:45

## 2019-08-23 RX ADMIN — Medication 1 APPLICATION(S): at 17:28

## 2019-08-23 RX ADMIN — LEVETIRACETAM 400 MILLIGRAM(S): 250 TABLET, FILM COATED ORAL at 23:05

## 2019-08-23 RX ADMIN — CHLORHEXIDINE GLUCONATE 15 MILLILITER(S): 213 SOLUTION TOPICAL at 17:28

## 2019-08-23 RX ADMIN — Medication 28.75 MILLIGRAM(S): at 00:20

## 2019-08-23 RX ADMIN — DEXMEDETOMIDINE HYDROCHLORIDE IN 0.9% SODIUM CHLORIDE 3.42 MICROGRAM(S)/KG/HR: 4 INJECTION INTRAVENOUS at 05:46

## 2019-08-23 RX ADMIN — Medication 28.75 MILLIGRAM(S): at 15:50

## 2019-08-23 RX ADMIN — LEVETIRACETAM 400 MILLIGRAM(S): 250 TABLET, FILM COATED ORAL at 08:39

## 2019-08-23 RX ADMIN — CHLORHEXIDINE GLUCONATE 15 MILLILITER(S): 213 SOLUTION TOPICAL at 05:45

## 2019-08-23 RX ADMIN — DEXMEDETOMIDINE HYDROCHLORIDE IN 0.9% SODIUM CHLORIDE 3.42 MICROGRAM(S)/KG/HR: 4 INJECTION INTRAVENOUS at 23:06

## 2019-08-23 NOTE — PROGRESS NOTE ADULT - SUBJECTIVE AND OBJECTIVE BOX
Patient is a 54y old  Female who presents with a chief complaint of Septic Shock (22 Aug 2019 06:26)      SUBJECTIVE / OVERNIGHT EVENTS:    Patient seen and examined at bedside. No acute events overnight.    Review of systems:     MEDICATIONS  (STANDING):  chlorhexidine 0.12% Liquid 15 milliLiter(s) Oral Mucosa every 12 hours  chlorhexidine 4% Liquid 1 Application(s) Topical <User Schedule>  dexmedetomidine Infusion 0.2 MICROgram(s)/kG/Hr (3.415 mL/Hr) IV Continuous <Continuous>  levETIRAcetam  IVPB 1000 milliGRAM(s) IV Intermittent <User Schedule>  petrolatum Ophthalmic Ointment 1 Application(s) Both EYES two times a day  piperacillin/tazobactam IVPB.. 3.375 Gram(s) IV Intermittent every 8 hours  propofol Infusion 10 MICROgram(s)/kG/Min (4.098 mL/Hr) IV Continuous <Continuous>  valproate sodium IVPB 750 milliGRAM(s) IV Intermittent every 8 hours    MEDICATIONS  (PRN):  LORazepam   Injectable 2 milliGRAM(s) IV Push every 6 hours PRN Seizure > 3 minutes or GTC      T(F): 99.1 (08-23 @ 04:00), Max: 99.5 (08-22 @ 20:00)  HR: 55 (08-23 @ 06:00) (44 - 70)  BP: 125/64 (08-23 @ 06:00) (84/36 - 136/62)  RR: 12 (08-23 @ 06:00) (11 - 16)  SpO2: 96% (08-23 @ 06:00) (95% - 100%)  CAPILLARY BLOOD GLUCOSE        I&O's Summary    22 Aug 2019 07:01  -  23 Aug 2019 07:00  --------------------------------------------------------  IN: 1595.6 mL / OUT: 1750 mL / NET: -154.4 mL        PHYSICAL EXAM:          LABS:  Labs personally reviewed.                        10.7   5.22  )-----------( 67       ( 23 Aug 2019 03:10 )             35.4     Hgb Trend: 10.7<--, 9.5<--, 10.8<--, 10.0<--, 10.3<--  08-23    147<H>  |  111<H>  |  13  ----------------------------<  87  3.8   |  22  |  0.76    Ca    8.2<L>      23 Aug 2019 03:10  Phos  5.3     08-23  Mg     1.9     08-23    TPro  5.7<L>  /  Alb  2.3<L>  /  TBili  < 0.2<L>  /  DBili  x   /  AST  18  /  ALT  13  /  AlkPhos  51  08-22    Creatinine Trend: 0.76<--, 0.77<--, 0.72<--, 0.62<--, 0.48<--, 0.44<-- Patient is a 54y old  Female who presents with a chief complaint of Septic Shock (22 Aug 2019 06:26)      SUBJECTIVE / OVERNIGHT EVENTS:    Patient seen and examined at bedside. Overnight, head bobbing seen, ON team concerned for seizure, gave ativan 2mg.    Review of systems: Pt intubated and sedated, unable to assess    MEDICATIONS  (STANDING):  chlorhexidine 0.12% Liquid 15 milliLiter(s) Oral Mucosa every 12 hours  chlorhexidine 4% Liquid 1 Application(s) Topical <User Schedule>  dexmedetomidine Infusion 0.2 MICROgram(s)/kG/Hr (3.415 mL/Hr) IV Continuous <Continuous>  levETIRAcetam  IVPB 1000 milliGRAM(s) IV Intermittent <User Schedule>  petrolatum Ophthalmic Ointment 1 Application(s) Both EYES two times a day  piperacillin/tazobactam IVPB.. 3.375 Gram(s) IV Intermittent every 8 hours  propofol Infusion 10 MICROgram(s)/kG/Min (4.098 mL/Hr) IV Continuous <Continuous>  valproate sodium IVPB 750 milliGRAM(s) IV Intermittent every 8 hours    MEDICATIONS  (PRN):  LORazepam   Injectable 2 milliGRAM(s) IV Push every 6 hours PRN Seizure > 3 minutes or GTC      T(F): 99.1 (08-23 @ 04:00), Max: 99.5 (08-22 @ 20:00)  HR: 55 (08-23 @ 06:00) (44 - 70)  BP: 125/64 (08-23 @ 06:00) (84/36 - 136/62)  RR: 12 (08-23 @ 06:00) (11 - 16)  SpO2: 96% (08-23 @ 06:00) (95% - 100%)  CAPILLARY BLOOD GLUCOSE    I&O's Summary    22 Aug 2019 07:01  -  23 Aug 2019 07:00  --------------------------------------------------------  IN: 1595.6 mL / OUT: 1750 mL / NET: -154.4 mL      PHYSICAL EXAM:  HEAD: Atraumatic, Normocephalic  	EYES: PERRL, conjunctiva and sclera clear  	ENMT: Moist mucous membranes. Poor dentition.  	NECK: Supple  	NERVOUS SYSTEM: Pt sedated, does not withdraw to pain  	CHEST/LUNG: Decreased breath sounds bilaterally  	HEART: Regular rate and rhythm; S1 and S2; No murmurs/no rubs/no gallops  	ABDOMEN: Soft, Nondistended: Bowel sounds present  	EXTREMITIES: 2+ Peripheral Pulses. 2+ pitting edema of the feet   	SKIN: Few blanching pressure wounds on back of feet and sacral area, +ecchymosis on forearm and dorsum of hand       LABS:  Labs personally reviewed.                        10.7   5.22  )-----------( 67       ( 23 Aug 2019 03:10 )             35.4     Hgb Trend: 10.7<--, 9.5<--, 10.8<--, 10.0<--, 10.3<--  08-23    147<H>  |  111<H>  |  13  ----------------------------<  87  3.8   |  22  |  0.76    Ca    8.2<L>      23 Aug 2019 03:10  Phos  5.3     08-23  Mg     1.9     08-23    TPro  5.7<L>  /  Alb  2.3<L>  /  TBili  < 0.2<L>  /  DBili  x   /  AST  18  /  ALT  13  /  AlkPhos  51  08-22    Creatinine Trend: 0.76<--, 0.77<--, 0.72<--, 0.62<--, 0.48<--, 0.44<--

## 2019-08-23 NOTE — PROGRESS NOTE ADULT - ASSESSMENT
Ms. Ponce is a 54F with a PMH of cerebral palsy and seizure disorder presenting from group home for acute AMS and increased work of breathing with recent seizure yesterday found to be hypothermic, hypotensive and bradycardic with leukocytosis and imaging showing new right lung opacification concerning for septic shock 2/2 to aspiration PNA. Admitted to MICU for further management.    NEURO: Patient has a history of cerebral palsy and seizure disorder. Baseline mental status minimally verbal but interactive/alert and withdraws to pain. Neurology on board. Lethargic currently 2/2 to metabolic encephalopathy due to infection.  - Intubated due to poor mental status s/p seizures concern for possible status epilepticus and hypercarbic respiratory failure  - Will treat for infection as detailed below to improve mental status   - Neurology recs appreciated  - CTH w/o contrast pending   - On propofol for sedation while intubated and seizure management titrate to RAAS -2 to -5  - Ativan 2mg PRN for seizures > 3 minutes or GTC when off sedation  - On Zyprexa 7.5 mg QD holding for now as patient is sedated  -Potential epileptogenic focus in the right fronto-temporal region shown on VEEG  -Spoke with Neuro in regards to pt's AEDs, will start patient on home dose Depakote 750mg TID and Keppra 1000mg TID   -Will continue to wean off precedex to evaluate pt's mental state and CPAP trial     CARDIOVASCULAR: No history of cardiac disease. However found to be hypotensive and bradycardic in the setting of sepsis. Pressor support improved SBPs and bradycardia.  -Off pressors    PULMONARY: Hypothermic with lung imaging concerning for new right lung opacification. On POCUS see signs of consolidation likely aspiration PNA due to seizure yesterday.  - S/p intubation in MICU for poor mental status and inability to maintain airway, as well as hypercarbic respiratory failure pCO2s 50s to 70s  - Vent settings  RR 16 FiO2 50% PEEP 5  - When plan to extubate will need steroids to minimize vocal cord edema  - Aspiration precautions   -Repeat ABG improved   -Sputum Cx no growth   -Wean off propofol w/ precedex on board , repeat CPAP trial     GI: Patient has a history of enteritis in the past but no GI symptoms currently. Eats pureed foods.  - OGT feeding initiated   - Aspiration precautions     RENAL:   - Purewick catheter in place with clear urine output  - Replete electrolytes PRN    INFECTIOUS DISEASE: New right lung opacification with hypothermia and leukocytosis meeting SIRS criteria with presumable source aspiration PNA complicated by hypotension 2/2 to septic shock. UA negative.   - If patient remains on high doses of pressor support will consider CT chest/abdomen/pelvis to rule out occult source of infection given also has a recent history of enteritis requiring MICU admission  - Follow up blood cultures  -D/c'd vancomycin, will continue on zosyn for aspiration pneumonia with last day 8/23  -Legionella negative  - CBCs with diff daily   -Sputum cultures showed no growth for 24h    HEMATOLOGY: has thrombocytopenia but has had thrombocytopenia in the past could be 2/2 to infection  - CBCs with diff daily  -Worsening thrombocytopenia, d/c'd heparin for now, possible cause depkote, went down on patient's depakote dose    ENDOCRINE:  - No active issues    SKIN:  - Has 2+ pitting edema of the feet likely due to immobility  - Few blanching pressure wounds on back of feet and sacral area     DVT PPx:  - D/C'd heparin for worsening thrombocytopenia, SCDs only     Code Status: Full Ms. Ponce is a 54F with a PMH of cerebral palsy and seizure disorder presenting from group home for acute AMS and increased work of breathing with recent seizure yesterday found to be hypothermic, hypotensive and bradycardic with leukocytosis and imaging showing new right lung opacification concerning for septic shock 2/2 to aspiration PNA. Admitted to MICU for further management.    NEURO: Patient has a history of cerebral palsy and seizure disorder. Baseline mental status minimally verbal but interactive/alert and withdraws to pain. Neurology on board. Lethargic currently 2/2 to metabolic encephalopathy due to infection.  - Intubated due to poor mental status s/p seizures concern for possible status epilepticus and hypercarbic respiratory failure  - Will treat for infection as detailed below to improve mental status   - Neurology recs appreciated  - On propofol for sedation while intubated and seizure management titrate to RAAS -2 to -5  - Ativan 2mg PRN for seizures > 3 minutes or GTC when off sedation  - On Zyprexa 7.5 mg QD holding for now as patient is sedated  -Potential epileptogenic focus in the right fronto-temporal region shown on VEEG  -Spoke with Neuro in regards to pt's AEDs, will start patient on home dose Depakote 750mg TID and Keppra 1000mg TID   -Will continue to wean off precedex to evaluate pt's mental state and CPAP trial     CARDIOVASCULAR: No history of cardiac disease. However found to be hypotensive and bradycardic in the setting of sepsis. Pressor support improved SBPs and bradycardia.  -Off pressors    PULMONARY: Hypothermic with lung imaging concerning for new right lung opacification. On POCUS see signs of consolidation likely aspiration PNA due to seizure yesterday.  - S/p intubation in MICU for poor mental status and inability to maintain airway, as well as hypercarbic respiratory failure pCO2s 50s to 70s  - Vent settings  RR 16 FiO2 50% PEEP 5  - When plan to extubate will need steroids to minimize vocal cord edema  - Aspiration precautions   -Repeat ABG improved   -Sputum Cx no growth   -Wean off propofol w/ precedex on board , repeat CPAP trial     GI: Patient has a history of enteritis in the past but no GI symptoms currently. Eats pureed foods.  - OGT feeding initiated   - Aspiration precautions     RENAL:   - Purewick catheter in place with clear urine output  - Replete electrolytes PRN    INFECTIOUS DISEASE: New right lung opacification with hypothermia and leukocytosis meeting SIRS criteria with presumable source aspiration PNA complicated by hypotension 2/2 to septic shock. UA negative.   - If patient remains on high doses of pressor support will consider CT chest/abdomen/pelvis to rule out occult source of infection given also has a recent history of enteritis requiring MICU admission  - Follow up blood cultures  -D/c'd vancomycin, will continue on zosyn for aspiration pneumonia with last day 8/23  -Legionella negative  - CBCs with diff daily   -Sputum cultures showed no growth for 24h    HEMATOLOGY: has thrombocytopenia but has had thrombocytopenia in the past could be 2/2 to infection  - CBCs with diff daily  -Worsening thrombocytopenia, d/c'd heparin for now, possible cause depkote, went down on patient's depakote dose    ENDOCRINE:  - No active issues    SKIN:  - Has 2+ pitting edema of the feet likely due to immobility  - Few blanching pressure wounds on back of feet and sacral area     DVT PPx:  - D/C'd heparin for worsening thrombocytopenia, SCDs only     Code Status: Full

## 2019-08-23 NOTE — PROGRESS NOTE ADULT - ATTENDING COMMENTS
Patient intubated for acute hypoxic respiratory failure due to aspiration pna, septic shock due to aspiration pna, seizures.  AEDs changed, will try to come off Precedex today to evaluate mental status.  Free water for hypernatremia.

## 2019-08-24 LAB
ALBUMIN SERPL ELPH-MCNC: 2.4 G/DL — LOW (ref 3.3–5)
ALP SERPL-CCNC: 56 U/L — SIGNIFICANT CHANGE UP (ref 40–120)
ALT FLD-CCNC: 13 U/L — SIGNIFICANT CHANGE UP (ref 4–33)
ANION GAP SERPL CALC-SCNC: 13 MMO/L — SIGNIFICANT CHANGE UP (ref 7–14)
AST SERPL-CCNC: 20 U/L — SIGNIFICANT CHANGE UP (ref 4–32)
BILIRUB SERPL-MCNC: 0.2 MG/DL — SIGNIFICANT CHANGE UP (ref 0.2–1.2)
BUN SERPL-MCNC: 12 MG/DL — SIGNIFICANT CHANGE UP (ref 7–23)
CALCIUM SERPL-MCNC: 8.4 MG/DL — SIGNIFICANT CHANGE UP (ref 8.4–10.5)
CHLORIDE SERPL-SCNC: 108 MMOL/L — HIGH (ref 98–107)
CO2 SERPL-SCNC: 21 MMOL/L — LOW (ref 22–31)
CREAT SERPL-MCNC: 0.76 MG/DL — SIGNIFICANT CHANGE UP (ref 0.5–1.3)
GLUCOSE SERPL-MCNC: 116 MG/DL — HIGH (ref 70–99)
HCT VFR BLD CALC: 31.5 % — LOW (ref 34.5–45)
HGB BLD-MCNC: 10.1 G/DL — LOW (ref 11.5–15.5)
MAGNESIUM SERPL-MCNC: 1.8 MG/DL — SIGNIFICANT CHANGE UP (ref 1.6–2.6)
MCHC RBC-ENTMCNC: 32.1 % — SIGNIFICANT CHANGE UP (ref 32–36)
MCHC RBC-ENTMCNC: 33.1 PG — SIGNIFICANT CHANGE UP (ref 27–34)
MCV RBC AUTO: 103.3 FL — HIGH (ref 80–100)
NRBC # FLD: 0.02 K/UL — SIGNIFICANT CHANGE UP (ref 0–0)
PHOSPHATE SERPL-MCNC: 3.9 MG/DL — SIGNIFICANT CHANGE UP (ref 2.5–4.5)
PLATELET # BLD AUTO: 77 K/UL — LOW (ref 150–400)
PMV BLD: 11 FL — SIGNIFICANT CHANGE UP (ref 7–13)
POTASSIUM SERPL-MCNC: 3.3 MMOL/L — LOW (ref 3.5–5.3)
POTASSIUM SERPL-SCNC: 3.3 MMOL/L — LOW (ref 3.5–5.3)
PROT SERPL-MCNC: 6.2 G/DL — SIGNIFICANT CHANGE UP (ref 6–8.3)
RBC # BLD: 3.05 M/UL — LOW (ref 3.8–5.2)
RBC # FLD: 15.9 % — HIGH (ref 10.3–14.5)
SODIUM SERPL-SCNC: 142 MMOL/L — SIGNIFICANT CHANGE UP (ref 135–145)
WBC # BLD: 5.12 K/UL — SIGNIFICANT CHANGE UP (ref 3.8–10.5)
WBC # FLD AUTO: 5.12 K/UL — SIGNIFICANT CHANGE UP (ref 3.8–10.5)

## 2019-08-24 PROCEDURE — 99291 CRITICAL CARE FIRST HOUR: CPT

## 2019-08-24 RX ORDER — POTASSIUM CHLORIDE 20 MEQ
40 PACKET (EA) ORAL ONCE
Refills: 0 | Status: COMPLETED | OUTPATIENT
Start: 2019-08-24 | End: 2019-08-24

## 2019-08-24 RX ORDER — POTASSIUM CHLORIDE 20 MEQ
10 PACKET (EA) ORAL
Refills: 0 | Status: COMPLETED | OUTPATIENT
Start: 2019-08-24 | End: 2019-08-24

## 2019-08-24 RX ORDER — SODIUM CHLORIDE 9 MG/ML
500 INJECTION, SOLUTION INTRAVENOUS ONCE
Refills: 0 | Status: COMPLETED | OUTPATIENT
Start: 2019-08-24 | End: 2019-08-24

## 2019-08-24 RX ORDER — POTASSIUM CHLORIDE 20 MEQ
40 PACKET (EA) ORAL ONCE
Refills: 0 | Status: DISCONTINUED | OUTPATIENT
Start: 2019-08-24 | End: 2019-08-24

## 2019-08-24 RX ORDER — MAGNESIUM SULFATE 500 MG/ML
1 VIAL (ML) INJECTION ONCE
Refills: 0 | Status: COMPLETED | OUTPATIENT
Start: 2019-08-24 | End: 2019-08-24

## 2019-08-24 RX ADMIN — Medication 28.75 MILLIGRAM(S): at 15:19

## 2019-08-24 RX ADMIN — DEXMEDETOMIDINE HYDROCHLORIDE IN 0.9% SODIUM CHLORIDE 3.42 MICROGRAM(S)/KG/HR: 4 INJECTION INTRAVENOUS at 19:55

## 2019-08-24 RX ADMIN — LEVETIRACETAM 400 MILLIGRAM(S): 250 TABLET, FILM COATED ORAL at 08:05

## 2019-08-24 RX ADMIN — Medication 40 MILLIEQUIVALENT(S): at 06:28

## 2019-08-24 RX ADMIN — Medication 28.75 MILLIGRAM(S): at 08:06

## 2019-08-24 RX ADMIN — LEVETIRACETAM 400 MILLIGRAM(S): 250 TABLET, FILM COATED ORAL at 19:55

## 2019-08-24 RX ADMIN — Medication 100 GRAM(S): at 07:54

## 2019-08-24 RX ADMIN — Medication 100 MILLIEQUIVALENT(S): at 07:54

## 2019-08-24 RX ADMIN — CHLORHEXIDINE GLUCONATE 1 APPLICATION(S): 213 SOLUTION TOPICAL at 10:17

## 2019-08-24 RX ADMIN — Medication 28.75 MILLIGRAM(S): at 00:00

## 2019-08-24 RX ADMIN — Medication 100 MILLIEQUIVALENT(S): at 09:00

## 2019-08-24 RX ADMIN — Medication 1 APPLICATION(S): at 05:30

## 2019-08-24 RX ADMIN — LEVETIRACETAM 400 MILLIGRAM(S): 250 TABLET, FILM COATED ORAL at 13:48

## 2019-08-24 RX ADMIN — CHLORHEXIDINE GLUCONATE 15 MILLILITER(S): 213 SOLUTION TOPICAL at 17:33

## 2019-08-24 RX ADMIN — CHLORHEXIDINE GLUCONATE 15 MILLILITER(S): 213 SOLUTION TOPICAL at 05:30

## 2019-08-24 RX ADMIN — DEXMEDETOMIDINE HYDROCHLORIDE IN 0.9% SODIUM CHLORIDE 3.42 MICROGRAM(S)/KG/HR: 4 INJECTION INTRAVENOUS at 07:55

## 2019-08-24 RX ADMIN — Medication 100 MILLIEQUIVALENT(S): at 10:16

## 2019-08-24 RX ADMIN — SODIUM CHLORIDE 500 MILLILITER(S): 9 INJECTION, SOLUTION INTRAVENOUS at 13:48

## 2019-08-24 RX ADMIN — Medication 1 APPLICATION(S): at 17:33

## 2019-08-24 NOTE — PROGRESS NOTE ADULT - ASSESSMENT
Ms. Ponce is a 54F with a PMH of cerebral palsy and seizure disorder presenting from group home for acute AMS and increased work of breathing with recent seizure yesterday found to be hypothermic, hypotensive and bradycardic with leukocytosis and imaging showing new right lung opacification concerning for septic shock 2/2 to aspiration PNA requiring pressors and airway support    NEURO: Patient has a history of cerebral palsy and seizure disorder. Baseline mental status minimally verbal but interactive/alert and withdraws to pain. Neurology on board. Lethargic currently 2/2 to metabolic encephalopathy due to infection.  - Intubated due to poor mental status s/p seizures concern for possible status epilepticus and hypercarbic respiratory failure  - Will treat for infection as detailed below to improve mental status   - Neurology recs appreciated  - Ativan 2mg PRN for seizures > 3 minutes or GTC when off sedation  - On Zyprexa 7.5 mg QD holding for now as patient is sedated  -Potential epileptogenic focus in the right fronto-temporal region shown on VEEG  -Spoke with Neuro in regards to pt's AEDs, will start patient on home dose Depakote 750mg TID and Keppra 1000mg TID   -Will continue to wean off precedex to evaluate pt's mental state and CPAP trial     CARDIOVASCULAR: No history of cardiac disease. However found to be hypotensive and bradycardic in the setting of sepsis. Pressor support improved SBPs and bradycardia.  -Off pressors    PULMONARY: Hypothermic with lung imaging concerning for new right lung opacification. On POCUS see signs of consolidation likely aspiration PNA due to seizure yesterday.  - S/p intubation in MICU for poor mental status and inability to maintain airway, as well as hypercarbic respiratory failure pCO2s 50s to 70s  - When plan to extubate will need steroids to minimize vocal cord edema  - Aspiration precautions   -Repeat ABG improved   -Sputum Cx no growth   -Wean off precedex to assess mental status and respiratory status for possible CPAP -> extubation  -If does not improve mentally after weaning off sedation, consider CTH to assess mental status    GI: Patient has a history of enteritis in the past but no GI symptoms currently. Eats pureed foods.  - OGT feeding initiated   - Aspiration precautions     RENAL:   - Purewick catheter in place with clear urine output  - Replete electrolytes PRN    INFECTIOUS DISEASE: New right lung opacification with hypothermia and leukocytosis meeting SIRS criteria with presumable source aspiration PNA complicated by hypotension 2/2 to septic shock. UA negative.   - If patient remains on high doses of pressor support will consider CT chest/abdomen/pelvis to rule out occult source of infection given also has a recent history of enteritis requiring MICU admission  - Follow up blood cultures  -D/c'd vancomycin, will continue on zosyn for aspiration pneumonia with last day 8/23  -Legionella negative  - CBCs with diff daily  -Sputum cultures showed no growth for 24h    HEMATOLOGY: has thrombocytopenia but has had thrombocytopenia in the past could be 2/2 to infection  - CBCs with diff daily  -Worsening thrombocytopenia, d/c'd heparin for now, possible cause depkote, went down on patient's depakote dose    ENDOCRINE:  - No active issues    SKIN:  - Has 2+ pitting edema of the feet likely due to immobility  - Few blanching pressure wounds on back of feet and sacral area     DVT PPx:  - D/C'd heparin for worsening thrombocytopenia, SCDs only     Code Status: Full

## 2019-08-24 NOTE — PROGRESS NOTE ADULT - ATTENDING COMMENTS
Patient intubated for acute hypoxic respiratory failure due to aspiration pna, septic shock due to aspiration pna, seizures.  Cont AEDs, will try to come off Precedex today to evaluate mental status.  Hypernatremia resolved.

## 2019-08-24 NOTE — PROGRESS NOTE ADULT - SUBJECTIVE AND OBJECTIVE BOX
COVERING RESIDENT: ANN MENG (PGY-1) | [facility] [phone] (NS) | [facility] [phone]    INTERVAL HPI/OVERNIGHT EVENTS:    SUBJECTIVE: Patient seen and examined at bedside.     CONSTITUTIONAL: No weakness, fevers or chills  EYES/ENT: No visual changes;  No vertigo or throat pain   NECK: No pain or stiffness  RESPIRATORY: No cough, wheezing, hemoptysis; No shortness of breath  CARDIOVASCULAR: No chest pain or palpitations  GASTROINTESTINAL: No abdominal or epigastric pain. No nausea, vomiting, or hematemesis; No diarrhea or constipation. No melena or hematochezia.  GENITOURINARY: No dysuria, frequency or hematuria  NEUROLOGICAL: No numbness or weakness  SKIN: No itching, rashes    OBJECTIVE:    VITAL SIGNS:  ICU Vital Signs Last 24 Hrs  T(C): 37 (24 Aug 2019 04:00), Max: 37.1 (23 Aug 2019 20:00)  T(F): 98.6 (24 Aug 2019 04:00), Max: 98.8 (23 Aug 2019 20:00)  HR: 56 (24 Aug 2019 06:00) (43 - 64)  BP: 120/72 (24 Aug 2019 06:00) (98/59 - 143/76)  BP(mean): 87 (24 Aug 2019 06:00) (57 - 98)  ABP: --  ABP(mean): --  RR: 12 (24 Aug 2019 06:00) (11 - 15)  SpO2: 98% (24 Aug 2019 06:00) (96% - 100%)    Mode: AC/ CMV (Assist Control/ Continuous Mandatory Ventilation), RR (machine): 12, TV (machine): 400, FiO2: 30, PEEP: 5, MAP: 10, PIP: 32    08-23 @ 07:01  -  08-24 @ 07:00  --------------------------------------------------------  IN: 2173.1 mL / OUT: 2180 mL / NET: -6.9 mL      CAPILLARY BLOOD GLUCOSE          PHYSICAL EXAM:    General: NAD  HEENT: NC/AT; PERRL, clear conjunctiva  Neck: supple  Respiratory: CTA b/l  Cardiovascular: +S1/S2; RRR  Abdomen: soft, NT/ND; +BS x4  Extremities: WWP, 2+ peripheral pulses b/l; no LE edema  Skin: normal color and turgor; no rash  Neurological:    MEDICATIONS:  MEDICATIONS  (STANDING):  chlorhexidine 0.12% Liquid 15 milliLiter(s) Oral Mucosa every 12 hours  chlorhexidine 4% Liquid 1 Application(s) Topical <User Schedule>  dexmedetomidine Infusion 0.2 MICROgram(s)/kG/Hr (3.415 mL/Hr) IV Continuous <Continuous>  levETIRAcetam  IVPB 1000 milliGRAM(s) IV Intermittent <User Schedule>  magnesium sulfate  IVPB 1 Gram(s) IV Intermittent once  petrolatum Ophthalmic Ointment 1 Application(s) Both EYES two times a day  potassium chloride  10 mEq/100 mL IVPB 10 milliEquivalent(s) IV Intermittent every 1 hour  valproate sodium IVPB 750 milliGRAM(s) IV Intermittent every 8 hours    MEDICATIONS  (PRN):  LORazepam   Injectable 2 milliGRAM(s) IV Push every 6 hours PRN Seizure > 3 minutes or GTC      ALLERGIES:  Allergies    Topamax (Unknown)    Intolerances        LABS:                        10.1   5.12  )-----------( 77       ( 24 Aug 2019 05:25 )             31.5     08-24    142  |  108<H>  |  12  ----------------------------<  116<H>  3.3<L>   |  21<L>  |  0.76    Ca    8.4      24 Aug 2019 05:25  Phos  3.9     08-24  Mg     1.8     08-24    TPro  6.2  /  Alb  2.4<L>  /  TBili  0.2  /  DBili  x   /  AST  20  /  ALT  13  /  AlkPhos  56  08-24          RADIOLOGY & ADDITIONAL TESTS: Reviewed. COVERING RESIDENT: ANN MENG (PGY-1) | [facility] [phone] (NS) | [facility] [phone]    INTERVAL HPI/OVERNIGHT EVENTS: No acute events overnight.    SUBJECTIVE: Patient seen and examined at bedside. Currently intubated and sedated, unable to respond due to poor mental status.    ROS: Unable to assess due to intubated/sedated.    OBJECTIVE:    VITAL SIGNS:  ICU Vital Signs Last 24 Hrs  T(C): 37 (24 Aug 2019 04:00), Max: 37.1 (23 Aug 2019 20:00)  T(F): 98.6 (24 Aug 2019 04:00), Max: 98.8 (23 Aug 2019 20:00)  HR: 56 (24 Aug 2019 06:00) (43 - 64)  BP: 120/72 (24 Aug 2019 06:00) (98/59 - 143/76)  BP(mean): 87 (24 Aug 2019 06:00) (57 - 98)  ABP: --  ABP(mean): --  RR: 12 (24 Aug 2019 06:00) (11 - 15)  SpO2: 98% (24 Aug 2019 06:00) (96% - 100%)    Mode: AC/ CMV (Assist Control/ Continuous Mandatory Ventilation), RR (machine): 12, TV (machine): 400, FiO2: 30, PEEP: 5, MAP: 10, PIP: 32    08-23 @ 07:01  -  08-24 @ 07:00  --------------------------------------------------------  IN: 2173.1 mL / OUT: 2180 mL / NET: -6.9 mL      CAPILLARY BLOOD GLUCOSE          PHYSICAL EXAM:    General: NAD  HEENT: NC/AT; PERRL, clear conjunctiva  Neck: supple  Respiratory: CTA bilateral, intubated, mildly decreased air exchange  Cardiac: S1/S2; RRR  Abdomen: soft, NT/ND; +BS x4  Extremities: WWP, 2+ peripheral pulses b/l; no LE edema  Skin: normal color and turgor; no rash  Neurological: Sedated, responsive to noxious stimuli    MEDICATIONS:  MEDICATIONS  (STANDING):  chlorhexidine 0.12% Liquid 15 milliLiter(s) Oral Mucosa every 12 hours  chlorhexidine 4% Liquid 1 Application(s) Topical <User Schedule>  dexmedetomidine Infusion 0.2 MICROgram(s)/kG/Hr (3.415 mL/Hr) IV Continuous <Continuous>  levETIRAcetam  IVPB 1000 milliGRAM(s) IV Intermittent <User Schedule>  magnesium sulfate  IVPB 1 Gram(s) IV Intermittent once  petrolatum Ophthalmic Ointment 1 Application(s) Both EYES two times a day  potassium chloride  10 mEq/100 mL IVPB 10 milliEquivalent(s) IV Intermittent every 1 hour  valproate sodium IVPB 750 milliGRAM(s) IV Intermittent every 8 hours    MEDICATIONS  (PRN):  LORazepam   Injectable 2 milliGRAM(s) IV Push every 6 hours PRN Seizure > 3 minutes or GTC      ALLERGIES:  Allergies    Topamax (Unknown)    Intolerances        LABS:                        10.1   5.12  )-----------( 77       ( 24 Aug 2019 05:25 )             31.5     08-24    142  |  108<H>  |  12  ----------------------------<  116<H>  3.3<L>   |  21<L>  |  0.76    Ca    8.4      24 Aug 2019 05:25  Phos  3.9     08-24  Mg     1.8     08-24    TPro  6.2  /  Alb  2.4<L>  /  TBili  0.2  /  DBili  x   /  AST  20  /  ALT  13  /  AlkPhos  56  08-24          RADIOLOGY & ADDITIONAL TESTS: Reviewed. COVERING RESIDENT: ANN MENG (PGY-1) | [facility] [phone] (NS) | [facility] [phone]    INTERVAL HPI/OVERNIGHT EVENTS: No acute events overnight.    SUBJECTIVE: Patient seen and examined at bedside. Currently intubated and sedated, unable to respond due to poor mental status.    ROS: Unable to assess due to intubated/sedated.    OBJECTIVE:    VITAL SIGNS:  ICU Vital Signs Last 24 Hrs  T(C): 37 (24 Aug 2019 04:00), Max: 37.1 (23 Aug 2019 20:00)  T(F): 98.6 (24 Aug 2019 04:00), Max: 98.8 (23 Aug 2019 20:00)  HR: 56 (24 Aug 2019 06:00) (43 - 64)  BP: 120/72 (24 Aug 2019 06:00) (98/59 - 143/76)  BP(mean): 87 (24 Aug 2019 06:00) (57 - 98)  ABP: --  ABP(mean): --  RR: 12 (24 Aug 2019 06:00) (11 - 15)  SpO2: 98% (24 Aug 2019 06:00) (96% - 100%)    Mode: AC/ CMV (Assist Control/ Continuous Mandatory Ventilation), RR (machine): 12, TV (machine): 400, FiO2: 30, PEEP: 5, MAP: 10, PIP: 32    08-23 @ 07:01  -  08-24 @ 07:00  --------------------------------------------------------  IN: 2173.1 mL / OUT: 2180 mL / NET: -6.9 mL      CAPILLARY BLOOD GLUCOSE          PHYSICAL EXAM:    General: NAD  HEENT: NC/AT; PERRL, clear conjunctiva  Neck: supple  Respiratory: CTA bilateral, intubated, mildly decreased air exchange  Cardiac: S1/S2; RRR  Abdomen: soft, NT/ND; +BS x4  Extremities: WWP, 2+ peripheral pulses b/l; 2+ pitting edema in LE bilaterally  Skin: normal color and turgor; no rash  Neurological: Sedated, responsive to noxious stimuli    MEDICATIONS:  MEDICATIONS  (STANDING):  chlorhexidine 0.12% Liquid 15 milliLiter(s) Oral Mucosa every 12 hours  chlorhexidine 4% Liquid 1 Application(s) Topical <User Schedule>  dexmedetomidine Infusion 0.2 MICROgram(s)/kG/Hr (3.415 mL/Hr) IV Continuous <Continuous>  levETIRAcetam  IVPB 1000 milliGRAM(s) IV Intermittent <User Schedule>  magnesium sulfate  IVPB 1 Gram(s) IV Intermittent once  petrolatum Ophthalmic Ointment 1 Application(s) Both EYES two times a day  potassium chloride  10 mEq/100 mL IVPB 10 milliEquivalent(s) IV Intermittent every 1 hour  valproate sodium IVPB 750 milliGRAM(s) IV Intermittent every 8 hours    MEDICATIONS  (PRN):  LORazepam   Injectable 2 milliGRAM(s) IV Push every 6 hours PRN Seizure > 3 minutes or GTC      ALLERGIES:  Allergies    Topamax (Unknown)    Intolerances        LABS:                        10.1   5.12  )-----------( 77       ( 24 Aug 2019 05:25 )             31.5     08-24    142  |  108<H>  |  12  ----------------------------<  116<H>  3.3<L>   |  21<L>  |  0.76    Ca    8.4      24 Aug 2019 05:25  Phos  3.9     08-24  Mg     1.8     08-24    TPro  6.2  /  Alb  2.4<L>  /  TBili  0.2  /  DBili  x   /  AST  20  /  ALT  13  /  AlkPhos  56  08-24          RADIOLOGY & ADDITIONAL TESTS: Reviewed.

## 2019-08-25 LAB
ALBUMIN SERPL ELPH-MCNC: 2.3 G/DL — LOW (ref 3.3–5)
ALP SERPL-CCNC: 52 U/L — SIGNIFICANT CHANGE UP (ref 40–120)
ALT FLD-CCNC: 10 U/L — SIGNIFICANT CHANGE UP (ref 4–33)
ANION GAP SERPL CALC-SCNC: 11 MMO/L — SIGNIFICANT CHANGE UP (ref 7–14)
ANION GAP SERPL CALC-SCNC: 13 MMO/L — SIGNIFICANT CHANGE UP (ref 7–14)
AST SERPL-CCNC: 18 U/L — SIGNIFICANT CHANGE UP (ref 4–32)
BASE EXCESS BLDA CALC-SCNC: -0.1 MMOL/L — SIGNIFICANT CHANGE UP
BASOPHILS # BLD AUTO: 0.05 K/UL — SIGNIFICANT CHANGE UP (ref 0–0.2)
BASOPHILS NFR BLD AUTO: 0.7 % — SIGNIFICANT CHANGE UP (ref 0–2)
BILIRUB SERPL-MCNC: 0.2 MG/DL — SIGNIFICANT CHANGE UP (ref 0.2–1.2)
BLOOD GAS ARTERIAL - FIO2: 30 — SIGNIFICANT CHANGE UP
BUN SERPL-MCNC: 13 MG/DL — SIGNIFICANT CHANGE UP (ref 7–23)
BUN SERPL-MCNC: 14 MG/DL — SIGNIFICANT CHANGE UP (ref 7–23)
CALCIUM SERPL-MCNC: 8 MG/DL — LOW (ref 8.4–10.5)
CALCIUM SERPL-MCNC: 8.1 MG/DL — LOW (ref 8.4–10.5)
CHLORIDE BLDA-SCNC: 112 MMOL/L — HIGH (ref 96–108)
CHLORIDE SERPL-SCNC: 108 MMOL/L — HIGH (ref 98–107)
CHLORIDE SERPL-SCNC: 109 MMOL/L — HIGH (ref 98–107)
CO2 SERPL-SCNC: 20 MMOL/L — LOW (ref 22–31)
CO2 SERPL-SCNC: 22 MMOL/L — SIGNIFICANT CHANGE UP (ref 22–31)
CREAT SERPL-MCNC: 0.56 MG/DL — SIGNIFICANT CHANGE UP (ref 0.5–1.3)
CREAT SERPL-MCNC: 0.58 MG/DL — SIGNIFICANT CHANGE UP (ref 0.5–1.3)
EOSINOPHIL # BLD AUTO: 0.5 K/UL — SIGNIFICANT CHANGE UP (ref 0–0.5)
EOSINOPHIL NFR BLD AUTO: 6.6 % — HIGH (ref 0–6)
GLUCOSE BLDA-MCNC: 100 MG/DL — HIGH (ref 70–99)
GLUCOSE SERPL-MCNC: 74 MG/DL — SIGNIFICANT CHANGE UP (ref 70–99)
GLUCOSE SERPL-MCNC: 96 MG/DL — SIGNIFICANT CHANGE UP (ref 70–99)
HCO3 BLDA-SCNC: 25 MMOL/L — SIGNIFICANT CHANGE UP (ref 22–26)
HCT VFR BLD CALC: 30.1 % — LOW (ref 34.5–45)
HCT VFR BLDA CALC: 29.1 % — LOW (ref 34.5–46.5)
HGB BLD-MCNC: 9.8 G/DL — LOW (ref 11.5–15.5)
HGB BLDA-MCNC: 9.4 G/DL — LOW (ref 11.5–15.5)
IMM GRANULOCYTES NFR BLD AUTO: 5.4 % — HIGH (ref 0–1.5)
LACTATE BLDA-SCNC: 1 MMOL/L — SIGNIFICANT CHANGE UP (ref 0.5–2)
LYMPHOCYTES # BLD AUTO: 2.52 K/UL — SIGNIFICANT CHANGE UP (ref 1–3.3)
LYMPHOCYTES # BLD AUTO: 33.1 % — SIGNIFICANT CHANGE UP (ref 13–44)
MAGNESIUM SERPL-MCNC: 1.9 MG/DL — SIGNIFICANT CHANGE UP (ref 1.6–2.6)
MAGNESIUM SERPL-MCNC: 1.9 MG/DL — SIGNIFICANT CHANGE UP (ref 1.6–2.6)
MCHC RBC-ENTMCNC: 32.6 % — SIGNIFICANT CHANGE UP (ref 32–36)
MCHC RBC-ENTMCNC: 33.2 PG — SIGNIFICANT CHANGE UP (ref 27–34)
MCV RBC AUTO: 102 FL — HIGH (ref 80–100)
MONOCYTES # BLD AUTO: 1.19 K/UL — HIGH (ref 0–0.9)
MONOCYTES NFR BLD AUTO: 15.6 % — HIGH (ref 2–14)
NEUTROPHILS # BLD AUTO: 2.95 K/UL — SIGNIFICANT CHANGE UP (ref 1.8–7.4)
NEUTROPHILS NFR BLD AUTO: 38.6 % — LOW (ref 43–77)
NRBC # FLD: 0.02 K/UL — SIGNIFICANT CHANGE UP (ref 0–0)
PCO2 BLDA: 30 MMHG — LOW (ref 32–48)
PH BLDA: 7.5 PH — HIGH (ref 7.35–7.45)
PHOSPHATE SERPL-MCNC: 4 MG/DL — SIGNIFICANT CHANGE UP (ref 2.5–4.5)
PHOSPHATE SERPL-MCNC: 4.7 MG/DL — HIGH (ref 2.5–4.5)
PLATELET # BLD AUTO: 77 K/UL — LOW (ref 150–400)
PMV BLD: 11 FL — SIGNIFICANT CHANGE UP (ref 7–13)
PO2 BLDA: 171 MMHG — HIGH (ref 83–108)
POTASSIUM BLDA-SCNC: 3.3 MMOL/L — LOW (ref 3.4–4.5)
POTASSIUM SERPL-MCNC: 4 MMOL/L — SIGNIFICANT CHANGE UP (ref 3.5–5.3)
POTASSIUM SERPL-MCNC: 4.4 MMOL/L — SIGNIFICANT CHANGE UP (ref 3.5–5.3)
POTASSIUM SERPL-SCNC: 4 MMOL/L — SIGNIFICANT CHANGE UP (ref 3.5–5.3)
POTASSIUM SERPL-SCNC: 4.4 MMOL/L — SIGNIFICANT CHANGE UP (ref 3.5–5.3)
PROT SERPL-MCNC: 6.3 G/DL — SIGNIFICANT CHANGE UP (ref 6–8.3)
RBC # BLD: 2.95 M/UL — LOW (ref 3.8–5.2)
RBC # FLD: 15.6 % — HIGH (ref 10.3–14.5)
SAO2 % BLDA: 99.6 % — HIGH (ref 95–99)
SODIUM BLDA-SCNC: 139 MMOL/L — SIGNIFICANT CHANGE UP (ref 136–146)
SODIUM SERPL-SCNC: 141 MMOL/L — SIGNIFICANT CHANGE UP (ref 135–145)
SODIUM SERPL-SCNC: 142 MMOL/L — SIGNIFICANT CHANGE UP (ref 135–145)
VALPROATE SERPL-MCNC: 67.7 UG/ML — SIGNIFICANT CHANGE UP (ref 50–100)
WBC # BLD: 7.62 K/UL — SIGNIFICANT CHANGE UP (ref 3.8–10.5)
WBC # FLD AUTO: 7.62 K/UL — SIGNIFICANT CHANGE UP (ref 3.8–10.5)

## 2019-08-25 PROCEDURE — 99291 CRITICAL CARE FIRST HOUR: CPT

## 2019-08-25 RX ORDER — OLANZAPINE 15 MG/1
7.5 TABLET, FILM COATED ORAL AT BEDTIME
Refills: 0 | Status: DISCONTINUED | OUTPATIENT
Start: 2019-08-25 | End: 2019-09-12

## 2019-08-25 RX ORDER — SODIUM CHLORIDE 9 MG/ML
500 INJECTION INTRAMUSCULAR; INTRAVENOUS; SUBCUTANEOUS ONCE
Refills: 0 | Status: COMPLETED | OUTPATIENT
Start: 2019-08-25 | End: 2019-08-25

## 2019-08-25 RX ORDER — LACOSAMIDE 50 MG/1
100 TABLET ORAL
Refills: 0 | Status: DISCONTINUED | OUTPATIENT
Start: 2019-08-25 | End: 2019-08-27

## 2019-08-25 RX ADMIN — LEVETIRACETAM 400 MILLIGRAM(S): 250 TABLET, FILM COATED ORAL at 14:58

## 2019-08-25 RX ADMIN — SODIUM CHLORIDE 500 MILLILITER(S): 9 INJECTION INTRAMUSCULAR; INTRAVENOUS; SUBCUTANEOUS at 10:10

## 2019-08-25 RX ADMIN — CHLORHEXIDINE GLUCONATE 15 MILLILITER(S): 213 SOLUTION TOPICAL at 17:05

## 2019-08-25 RX ADMIN — CHLORHEXIDINE GLUCONATE 1 APPLICATION(S): 213 SOLUTION TOPICAL at 16:20

## 2019-08-25 RX ADMIN — Medication 4 MILLIGRAM(S): at 21:15

## 2019-08-25 RX ADMIN — LEVETIRACETAM 400 MILLIGRAM(S): 250 TABLET, FILM COATED ORAL at 08:19

## 2019-08-25 RX ADMIN — CHLORHEXIDINE GLUCONATE 15 MILLILITER(S): 213 SOLUTION TOPICAL at 05:57

## 2019-08-25 RX ADMIN — Medication 28.75 MILLIGRAM(S): at 16:19

## 2019-08-25 RX ADMIN — Medication 1 APPLICATION(S): at 05:57

## 2019-08-25 RX ADMIN — OLANZAPINE 7.5 MILLIGRAM(S): 15 TABLET, FILM COATED ORAL at 21:15

## 2019-08-25 RX ADMIN — LEVETIRACETAM 400 MILLIGRAM(S): 250 TABLET, FILM COATED ORAL at 19:54

## 2019-08-25 RX ADMIN — Medication 28.75 MILLIGRAM(S): at 10:10

## 2019-08-25 RX ADMIN — DEXMEDETOMIDINE HYDROCHLORIDE IN 0.9% SODIUM CHLORIDE 3.42 MICROGRAM(S)/KG/HR: 4 INJECTION INTRAVENOUS at 19:54

## 2019-08-25 RX ADMIN — Medication 28.75 MILLIGRAM(S): at 23:27

## 2019-08-25 RX ADMIN — Medication 28.75 MILLIGRAM(S): at 02:43

## 2019-08-25 NOTE — PROGRESS NOTE ADULT - ASSESSMENT
Ms. Ponce is a 54F with a PMH of cerebral palsy and seizure disorder presenting from group home for acute AMS and increased work of breathing with recent seizure yesterday found to be hypothermic, hypotensive and bradycardic with leukocytosis and imaging showing new right lung opacification concerning for septic shock 2/2 to aspiration PNA requiring pressors and airway support    NEURO: Patient has a history of cerebral palsy and seizure disorder. Baseline mental status minimally verbal but interactive/alert and withdraws to pain. Neurology on board. Lethargic currently 2/2 to metabolic encephalopathy due to infection.  - Intubated due to poor mental status s/p seizures concern for possible status epilepticus and hypercarbic respiratory failure  - Will treat for infection as detailed below to improve mental status   - Neurology recs appreciated  - Ativan 2mg PRN for seizures > 3 minutes or GTC when off sedation  - On Zyprexa 7.5 mg QD holding for now as patient is sedated  -Potential epileptogenic focus in the right fronto-temporal region shown on VEEG  -Spoke with Neuro in regards to pt's AEDs, will start patient on home dose Depakote 750mg TID and Keppra 1000mg TID   -Will continue to wean off precedex to evaluate pt's mental state and CPAP trial     CARDIOVASCULAR: No history of cardiac disease. However found to be hypotensive and bradycardic in the setting of sepsis. Pressor support improved SBPs and bradycardia.  -Off pressors    PULMONARY: Hypothermic with lung imaging concerning for new right lung opacification. On POCUS see signs of consolidation likely aspiration PNA due to seizure yesterday.  - S/p intubation in MICU for poor mental status and inability to maintain airway, as well as hypercarbic respiratory failure pCO2s 50s to 70s  - When plan to extubate will need steroids to minimize vocal cord edema  - Aspiration precautions   -Repeat ABG improved   -Sputum Cx no growth   -Wean off precedex to assess mental status and respiratory status for possible CPAP -> extubation  -If does not improve mentally after weaning off sedation, consider CTH to assess mental status    GI: Patient has a history of enteritis in the past but no GI symptoms currently. Eats pureed foods.  - OGT feeding initiated   - Aspiration precautions     RENAL:   - Purewick catheter in place with clear urine output  - Replete electrolytes PRN    INFECTIOUS DISEASE: New right lung opacification with hypothermia and leukocytosis meeting SIRS criteria with presumable source aspiration PNA complicated by hypotension 2/2 to septic shock. UA negative.   - If patient remains on high doses of pressor support will consider CT chest/abdomen/pelvis to rule out occult source of infection given also has a recent history of enteritis requiring MICU admission  - Follow up blood cultures  -D/c'd vancomycin, will continue on zosyn for aspiration pneumonia with last day 8/23  -Legionella negative  - CBCs with diff daily  -Sputum cultures showed no growth for 24h    HEMATOLOGY: has thrombocytopenia but has had thrombocytopenia in the past could be 2/2 to infection  - CBCs with diff daily  -Worsening thrombocytopenia, d/c'd heparin for now, possible cause depkote, went down on patient's depakote dose    ENDOCRINE:  - No active issues    SKIN:  - Has 2+ pitting edema of the feet likely due to immobility  - Few blanching pressure wounds on back of feet and sacral area     DVT PPx:  - D/C'd heparin for worsening thrombocytopenia, SCDs only     Code Status: Full Ms. Ponce is a 54F with a PMH of cerebral palsy and seizure disorder presenting from group home for acute AMS and increased work of breathing with recent seizure yesterday found to be hypothermic, hypotensive and bradycardic with leukocytosis and imaging showing new right lung opacification concerning for septic shock 2/2 to aspiration PNA requiring pressors and airway support    NEURO: Patient has a history of cerebral palsy and seizure disorder. Baseline mental status minimally verbal but interactive/alert and withdraws to pain. Neurology on board. Lethargic currently 2/2 to metabolic encephalopathy due to infection.  - Intubated due to poor mental status s/p seizures concern for possible status epilepticus and hypercarbic respiratory failure  - Will treat for infection as detailed below to improve mental status   - Neurology recs appreciated  - Ativan 2mg PRN for seizures > 3 minutes or GTC when off sedation  - On Zyprexa 7.5 mg QD holding for now as patient is sedated  -Potential epileptogenic focus in the right fronto-temporal region shown on VEEG  -Spoke with Neuro in regards to pt's AEDs, will start patient on home dose Depakote 750mg TID and Keppra 1000mg TID   -F/u Depakote and Keppra level   -Restart patient on home zyprexa     CARDIOVASCULAR: No history of cardiac disease. However found to be hypotensive and bradycardic in the setting of sepsis. Pressor support improved SBPs and bradycardia.  -Off pressors    PULMONARY: Hypothermic with lung imaging concerning for new right lung opacification. On POCUS see signs of consolidation likely aspiration PNA due to seizure yesterday.  - S/p intubation in MICU for poor mental status and inability to maintain airway, as well as hypercarbic respiratory failure pCO2s 50s to 70s  - When plan to extubate will need steroids to minimize vocal cord edema  - Aspiration precautions   -Repeat ABG improved   -Sputum Cx no growth   -Off sedation, apneic on CPAP trial, will attempt again     GI: Patient has a history of enteritis in the past but no GI symptoms currently. Eats pureed foods.  - OGT feeding initiated   - Aspiration precautions     RENAL:   - Purewick catheter in place with clear urine output  - Replete electrolytes PRN    INFECTIOUS DISEASE: New right lung opacification with hypothermia and leukocytosis meeting SIRS criteria with presumable source aspiration PNA complicated by hypotension 2/2 to septic shock. UA negative.   - If patient remains on high doses of pressor support will consider CT chest/abdomen/pelvis to rule out occult source of infection given also has a recent history of enteritis requiring MICU admission  - Follow up blood cultures  -D/c'd vancomycin, will continue on zosyn for aspiration pneumonia with last day 8/23  -Legionella negative  - CBCs with diff daily  -Sputum cultures showed no growth for 24h    HEMATOLOGY: has thrombocytopenia but has had thrombocytopenia in the past could be 2/2 to infection  - CBCs with diff daily  -Worsening thrombocytopenia, d/c'd heparin for now, possible cause Depakote went down on patient's Depakote dose, will trend platelets     ENDOCRINE:  - No active issues    SKIN:  - Has 2+ pitting edema of the feet likely due to immobility  - Few blanching pressure wounds on back of feet and sacral area     DVT PPx:  - D/C'd heparin for worsening thrombocytopenia, SCDs only     Code Status: Full

## 2019-08-25 NOTE — PROGRESS NOTE ADULT - SUBJECTIVE AND OBJECTIVE BOX
Patient is a 54y old  Female who presents with a chief complaint of Septic Shock (24 Aug 2019 07:27)      SUBJECTIVE / OVERNIGHT EVENTS:    Patient seen and examined at bedside. No acute events overnight.    Review of systems:     MEDICATIONS  (STANDING):  chlorhexidine 0.12% Liquid 15 milliLiter(s) Oral Mucosa every 12 hours  chlorhexidine 4% Liquid 1 Application(s) Topical <User Schedule>  dexmedetomidine Infusion 0.2 MICROgram(s)/kG/Hr (3.415 mL/Hr) IV Continuous <Continuous>  levETIRAcetam  IVPB 1000 milliGRAM(s) IV Intermittent <User Schedule>  petrolatum Ophthalmic Ointment 1 Application(s) Both EYES two times a day  valproate sodium IVPB 750 milliGRAM(s) IV Intermittent every 8 hours    MEDICATIONS  (PRN):  LORazepam   Injectable 2 milliGRAM(s) IV Push every 6 hours PRN Seizure > 3 minutes or GTC      T(F): 99.1 (08-25 @ 00:00), Max: 99.1 (08-25 @ 00:00)  HR: 53 (08-25 @ 04:00) (52 - 79)  BP: 103/53 (08-25 @ 04:00) (73/48 - 137/115)  RR: 12 (08-25 @ 04:00) (10 - 30)  SpO2: 100% (08-25 @ 04:00) (95% - 100%)  CAPILLARY BLOOD GLUCOSE        I&O's Summary    23 Aug 2019 07:01  -  24 Aug 2019 07:00  --------------------------------------------------------  IN: 2173.1 mL / OUT: 2180 mL / NET: -6.9 mL    24 Aug 2019 07:01  -  25 Aug 2019 06:16  --------------------------------------------------------  IN: 3038.2 mL / OUT: 2000 mL / NET: 1038.2 mL        PHYSICAL EXAM:        LABS:  Labs personally reviewed.                        9.8    7.62  )-----------( 77       ( 25 Aug 2019 03:58 )             30.1     Hgb Trend: 9.8<--, 10.1<--, 10.7<--, 9.5<--, 10.8<--  08-25    141  |  108<H>  |  13  ----------------------------<  74  4.0   |  20<L>  |  0.58    Ca    8.1<L>      25 Aug 2019 03:58  Phos  4.0     08-25  Mg     1.9     08-25    TPro  6.3  /  Alb  2.3<L>  /  TBili  0.2  /  DBili  x   /  AST  18  /  ALT  10  /  AlkPhos  52  08-25    Creatinine Trend: 0.58<--, 0.76<--, 0.76<--, 0.77<--, 0.72<--, 0.62<-- Patient is a 54y old  Female who presents with a chief complaint of Septic Shock (24 Aug 2019 07:27)      SUBJECTIVE / OVERNIGHT EVENTS:    Patient seen and examined at bedside. No acute events overnight.    Review of systems: Cannot assess, pt intubated, A&O x0.    MEDICATIONS  (STANDING):  chlorhexidine 0.12% Liquid 15 milliLiter(s) Oral Mucosa every 12 hours  chlorhexidine 4% Liquid 1 Application(s) Topical <User Schedule>  dexmedetomidine Infusion 0.2 MICROgram(s)/kG/Hr (3.415 mL/Hr) IV Continuous <Continuous>  levETIRAcetam  IVPB 1000 milliGRAM(s) IV Intermittent <User Schedule>  petrolatum Ophthalmic Ointment 1 Application(s) Both EYES two times a day  valproate sodium IVPB 750 milliGRAM(s) IV Intermittent every 8 hours    MEDICATIONS  (PRN):  LORazepam   Injectable 2 milliGRAM(s) IV Push every 6 hours PRN Seizure > 3 minutes or GTC      T(F): 99.1 (08-25 @ 00:00), Max: 99.1 (08-25 @ 00:00)  HR: 53 (08-25 @ 04:00) (52 - 79)  BP: 103/53 (08-25 @ 04:00) (73/48 - 137/115)  RR: 12 (08-25 @ 04:00) (10 - 30)  SpO2: 100% (08-25 @ 04:00) (95% - 100%)  CAPILLARY BLOOD GLUCOSE        I&O's Summary    23 Aug 2019 07:01  -  24 Aug 2019 07:00  --------------------------------------------------------  IN: 2173.1 mL / OUT: 2180 mL / NET: -6.9 mL    24 Aug 2019 07:01  -  25 Aug 2019 06:16  --------------------------------------------------------  IN: 3038.2 mL / OUT: 2000 mL / NET: 1038.2 mL        PHYSICAL EXAM:  General: NAD, resting comfortably in bed   HEENT: NC/AT; PERRL, clear conjunctiva  Neck: supple  Respiratory: CTA bilateral, intubated, mildly decreased air exchange  Cardiac: S1/S2; RRR  Abdomen: soft, NT/ND; +BS x4  Extremities: WWP, 2+ peripheral pulses b/l; 2+ pitting edema in LE bilaterally  Skin: normal color and turgor; no rash  Neurological: Alert, awake, opens eyes to verbal stimuli     LABS:  Labs personally reviewed.                        9.8    7.62  )-----------( 77       ( 25 Aug 2019 03:58 )             30.1     Hgb Trend: 9.8<--, 10.1<--, 10.7<--, 9.5<--, 10.8<--  08-25    141  |  108<H>  |  13  ----------------------------<  74  4.0   |  20<L>  |  0.58    Ca    8.1<L>      25 Aug 2019 03:58  Phos  4.0     08-25  Mg     1.9     08-25    TPro  6.3  /  Alb  2.3<L>  /  TBili  0.2  /  DBili  x   /  AST  18  /  ALT  10  /  AlkPhos  52  08-25    Creatinine Trend: 0.58<--, 0.76<--, 0.76<--, 0.77<--, 0.72<--, 0.62<--

## 2019-08-25 NOTE — PROGRESS NOTE ADULT - ATTENDING COMMENTS
55 y/o F with PMH of CP and seizure d/o presented to the hospital with AMS.  Hospital course c/b seizure and aspiration PNA requiring intubation and ventilatory support. Completed course of abx.  Off sedation, clinically improving.  Cont AEDs.  Restart Zyprexa. Cont TFs.

## 2019-08-26 LAB
ALBUMIN SERPL ELPH-MCNC: 2.3 G/DL — LOW (ref 3.3–5)
ALP SERPL-CCNC: 50 U/L — SIGNIFICANT CHANGE UP (ref 40–120)
ALT FLD-CCNC: 11 U/L — SIGNIFICANT CHANGE UP (ref 4–33)
AMMONIA BLD-MCNC: 30 UMOL/L — SIGNIFICANT CHANGE UP (ref 11–55)
ANION GAP SERPL CALC-SCNC: 11 MMO/L — SIGNIFICANT CHANGE UP (ref 7–14)
AST SERPL-CCNC: 20 U/L — SIGNIFICANT CHANGE UP (ref 4–32)
BASOPHILS # BLD AUTO: 0.04 K/UL — SIGNIFICANT CHANGE UP (ref 0–0.2)
BASOPHILS NFR BLD AUTO: 0.6 % — SIGNIFICANT CHANGE UP (ref 0–2)
BILIRUB SERPL-MCNC: 0.2 MG/DL — SIGNIFICANT CHANGE UP (ref 0.2–1.2)
BUN SERPL-MCNC: 14 MG/DL — SIGNIFICANT CHANGE UP (ref 7–23)
CALCIUM SERPL-MCNC: 8 MG/DL — LOW (ref 8.4–10.5)
CHLORIDE SERPL-SCNC: 110 MMOL/L — HIGH (ref 98–107)
CO2 SERPL-SCNC: 21 MMOL/L — LOW (ref 22–31)
CREAT SERPL-MCNC: 0.52 MG/DL — SIGNIFICANT CHANGE UP (ref 0.5–1.3)
EOSINOPHIL # BLD AUTO: 0.57 K/UL — HIGH (ref 0–0.5)
EOSINOPHIL NFR BLD AUTO: 8.6 % — HIGH (ref 0–6)
GLUCOSE BLDC GLUCOMTR-MCNC: 103 MG/DL — HIGH (ref 70–99)
GLUCOSE BLDC GLUCOMTR-MCNC: 74 MG/DL — SIGNIFICANT CHANGE UP (ref 70–99)
GLUCOSE SERPL-MCNC: 64 MG/DL — LOW (ref 70–99)
HCT VFR BLD CALC: 28.8 % — LOW (ref 34.5–45)
HGB BLD-MCNC: 9.2 G/DL — LOW (ref 11.5–15.5)
IMM GRANULOCYTES NFR BLD AUTO: 4.5 % — HIGH (ref 0–1.5)
LYMPHOCYTES # BLD AUTO: 2.22 K/UL — SIGNIFICANT CHANGE UP (ref 1–3.3)
LYMPHOCYTES # BLD AUTO: 33.5 % — SIGNIFICANT CHANGE UP (ref 13–44)
MAGNESIUM SERPL-MCNC: 1.9 MG/DL — SIGNIFICANT CHANGE UP (ref 1.6–2.6)
MCHC RBC-ENTMCNC: 31.9 % — LOW (ref 32–36)
MCHC RBC-ENTMCNC: 33 PG — SIGNIFICANT CHANGE UP (ref 27–34)
MCV RBC AUTO: 103.2 FL — HIGH (ref 80–100)
MONOCYTES # BLD AUTO: 1.04 K/UL — HIGH (ref 0–0.9)
MONOCYTES NFR BLD AUTO: 15.7 % — HIGH (ref 2–14)
NEUTROPHILS # BLD AUTO: 2.46 K/UL — SIGNIFICANT CHANGE UP (ref 1.8–7.4)
NEUTROPHILS NFR BLD AUTO: 37.1 % — LOW (ref 43–77)
NRBC # FLD: 0 K/UL — SIGNIFICANT CHANGE UP (ref 0–0)
PHOSPHATE SERPL-MCNC: 4.7 MG/DL — HIGH (ref 2.5–4.5)
PLATELET # BLD AUTO: 74 K/UL — LOW (ref 150–400)
PMV BLD: 10.4 FL — SIGNIFICANT CHANGE UP (ref 7–13)
POTASSIUM SERPL-MCNC: 3.7 MMOL/L — SIGNIFICANT CHANGE UP (ref 3.5–5.3)
POTASSIUM SERPL-SCNC: 3.7 MMOL/L — SIGNIFICANT CHANGE UP (ref 3.5–5.3)
PROT SERPL-MCNC: 6.1 G/DL — SIGNIFICANT CHANGE UP (ref 6–8.3)
RBC # BLD: 2.79 M/UL — LOW (ref 3.8–5.2)
RBC # FLD: 15.9 % — HIGH (ref 10.3–14.5)
SODIUM SERPL-SCNC: 142 MMOL/L — SIGNIFICANT CHANGE UP (ref 135–145)
WBC # BLD: 6.63 K/UL — SIGNIFICANT CHANGE UP (ref 3.8–10.5)
WBC # FLD AUTO: 6.63 K/UL — SIGNIFICANT CHANGE UP (ref 3.8–10.5)

## 2019-08-26 PROCEDURE — 99291 CRITICAL CARE FIRST HOUR: CPT

## 2019-08-26 PROCEDURE — 95819 EEG AWAKE AND ASLEEP: CPT | Mod: 26

## 2019-08-26 PROCEDURE — 71045 X-RAY EXAM CHEST 1 VIEW: CPT | Mod: 26

## 2019-08-26 RX ORDER — DORNASE ALFA 1 MG/ML
2.5 SOLUTION RESPIRATORY (INHALATION) DAILY
Refills: 0 | Status: DISCONTINUED | OUTPATIENT
Start: 2019-08-26 | End: 2019-08-26

## 2019-08-26 RX ORDER — DEXTROSE 50 % IN WATER 50 %
50 SYRINGE (ML) INTRAVENOUS ONCE
Refills: 0 | Status: COMPLETED | OUTPATIENT
Start: 2019-08-26 | End: 2019-08-26

## 2019-08-26 RX ORDER — MIDAZOLAM HYDROCHLORIDE 1 MG/ML
2 INJECTION, SOLUTION INTRAMUSCULAR; INTRAVENOUS ONCE
Refills: 0 | Status: DISCONTINUED | OUTPATIENT
Start: 2019-08-26 | End: 2019-08-26

## 2019-08-26 RX ORDER — CHLORHEXIDINE GLUCONATE 213 G/1000ML
15 SOLUTION TOPICAL EVERY 12 HOURS
Refills: 0 | Status: DISCONTINUED | OUTPATIENT
Start: 2019-08-26 | End: 2019-09-03

## 2019-08-26 RX ORDER — SODIUM CHLORIDE 9 MG/ML
4 INJECTION INTRAMUSCULAR; INTRAVENOUS; SUBCUTANEOUS EVERY 6 HOURS
Refills: 0 | Status: DISCONTINUED | OUTPATIENT
Start: 2019-08-26 | End: 2019-09-04

## 2019-08-26 RX ADMIN — Medication 28.75 MILLIGRAM(S): at 23:31

## 2019-08-26 RX ADMIN — Medication 28.75 MILLIGRAM(S): at 08:14

## 2019-08-26 RX ADMIN — LACOSAMIDE 100 MILLIGRAM(S): 50 TABLET ORAL at 05:07

## 2019-08-26 RX ADMIN — Medication 2 MILLIGRAM(S): at 16:41

## 2019-08-26 RX ADMIN — Medication 1 APPLICATION(S): at 05:06

## 2019-08-26 RX ADMIN — Medication 1 APPLICATION(S): at 18:59

## 2019-08-26 RX ADMIN — Medication 28.75 MILLIGRAM(S): at 16:41

## 2019-08-26 RX ADMIN — LEVETIRACETAM 400 MILLIGRAM(S): 250 TABLET, FILM COATED ORAL at 08:14

## 2019-08-26 RX ADMIN — Medication 50 MILLILITER(S): at 05:06

## 2019-08-26 RX ADMIN — CHLORHEXIDINE GLUCONATE 1 APPLICATION(S): 213 SOLUTION TOPICAL at 07:46

## 2019-08-26 RX ADMIN — LEVETIRACETAM 400 MILLIGRAM(S): 250 TABLET, FILM COATED ORAL at 20:39

## 2019-08-26 RX ADMIN — LEVETIRACETAM 400 MILLIGRAM(S): 250 TABLET, FILM COATED ORAL at 14:31

## 2019-08-26 RX ADMIN — LACOSAMIDE 100 MILLIGRAM(S): 50 TABLET ORAL at 19:00

## 2019-08-26 RX ADMIN — DEXMEDETOMIDINE HYDROCHLORIDE IN 0.9% SODIUM CHLORIDE 3.42 MICROGRAM(S)/KG/HR: 4 INJECTION INTRAVENOUS at 07:46

## 2019-08-26 RX ADMIN — SODIUM CHLORIDE 4 MILLILITER(S): 9 INJECTION INTRAMUSCULAR; INTRAVENOUS; SUBCUTANEOUS at 23:12

## 2019-08-26 RX ADMIN — MIDAZOLAM HYDROCHLORIDE 2 MILLIGRAM(S): 1 INJECTION, SOLUTION INTRAMUSCULAR; INTRAVENOUS at 11:53

## 2019-08-26 RX ADMIN — CHLORHEXIDINE GLUCONATE 15 MILLILITER(S): 213 SOLUTION TOPICAL at 05:06

## 2019-08-26 RX ADMIN — CHLORHEXIDINE GLUCONATE 15 MILLILITER(S): 213 SOLUTION TOPICAL at 18:59

## 2019-08-26 NOTE — PROGRESS NOTE ADULT - SUBJECTIVE AND OBJECTIVE BOX
Gregg Chavez, PGY2   Pager [phone]    INTERVAL HPI/OVERNIGHT EVENTS:    SUBJECTIVE: Patient seen and examined at bedside.     CONSTITUTIONAL: No fevers, chills, weakness  HEENT: No headache, acute visual changes, throat pain  NECK: No pain or stiffness  RESPIRATORY: No sob, cough, wheezing, hemoptysis  CARDIOVASCULAR: No chest pain or palpitations  GASTROINTESTINAL: No abdominal pain, nausea, vomiting, constipation, or diarrhea  GENITOURINARY: No dysuria, frequency or hematuria  NEUROLOGICAL: No numbness or weakness  SKIN: No rash or itching    OBJECTIVE:    VITAL SIGNS:  ICU Vital Signs Last 24 Hrs  T(C): 36 (26 Aug 2019 04:00), Max: 36.7 (25 Aug 2019 20:00)  T(F): 96.8 (26 Aug 2019 04:00), Max: 98.1 (25 Aug 2019 20:00)  HR: 48 (26 Aug 2019 07:20) (48 - 107)  BP: 95/57 (26 Aug 2019 06:00) (77/44 - 121/66)  BP(mean): 69 (26 Aug 2019 06:00) (55 - 85)  ABP: --  ABP(mean): --  RR: 12 (26 Aug 2019 06:00) (12 - 23)  SpO2: 100% (26 Aug 2019 07:20) (94% - 100%)    Mode: AC/ CMV (Assist Control/ Continuous Mandatory Ventilation), RR (machine): 12, TV (machine): 360, FiO2: 30, PEEP: 5, MAP: 10, PIP: 31    08-25 @ 07:01  -  08-26 @ 07:00  --------------------------------------------------------  IN: 1596.3 mL / OUT: 2000 mL / NET: -403.7 mL      CAPILLARY BLOOD GLUCOSE      POCT Blood Glucose.: 74 mg/dL (26 Aug 2019 04:52)      PHYSICAL EXAM:    General: NAD  HEENT: NCAT, PERRL, clear conjunctiva, mmm  Neck: supple, no JVD  Respiratory: CTAB  Cardiovascular: RRR, S1S2, no m/r/g  Abdomen: soft, nontender, nondistended, normal bowel sounds  Extremities: no edema or cyanosis  Skin: normal color and turgor; no rash  Neurological: nonfocal    MEDICATIONS:  MEDICATIONS  (STANDING):  chlorhexidine 0.12% Liquid 15 milliLiter(s) Oral Mucosa every 12 hours  chlorhexidine 4% Liquid 1 Application(s) Topical <User Schedule>  dexmedetomidine Infusion 0.2 MICROgram(s)/kG/Hr (3.415 mL/Hr) IV Continuous <Continuous>  lacosamide 100 milliGRAM(s) Oral two times a day  levETIRAcetam  IVPB 1000 milliGRAM(s) IV Intermittent <User Schedule>  OLANZapine 7.5 milliGRAM(s) Oral at bedtime  petrolatum Ophthalmic Ointment 1 Application(s) Both EYES two times a day  valproate sodium IVPB 750 milliGRAM(s) IV Intermittent every 8 hours    MEDICATIONS  (PRN):  LORazepam   Injectable 2 milliGRAM(s) IV Push every 6 hours PRN Seizure > 3 minutes or GTC      ALLERGIES:  Allergies    Topamax (Unknown)    Intolerances        LABS:                        9.2    6.63  )-----------( 74       ( 26 Aug 2019 03:00 )             28.8     08-26    142  |  110<H>  |  14  ----------------------------<  64<L>  3.7   |  21<L>  |  0.52    Ca    8.0<L>      26 Aug 2019 03:00  Phos  4.7     08-26  Mg     1.9     08-26    TPro  6.1  /  Alb  2.3<L>  /  TBili  0.2  /  DBili  x   /  AST  20  /  ALT  11  /  AlkPhos  50  08-26          RADIOLOGY & ADDITIONAL TESTS: Reviewed. INTERVAL HPI/OVERNIGHT EVENTS: seizure, given ativan and started on locosamide, VEEG ordered     SUBJECTIVE: Patient seen and examined at bedside.     limited, patient is intubated     OBJECTIVE:    VITAL SIGNS:  ICU Vital Signs Last 24 Hrs  T(C): 36 (26 Aug 2019 04:00), Max: 36.7 (25 Aug 2019 20:00)  T(F): 96.8 (26 Aug 2019 04:00), Max: 98.1 (25 Aug 2019 20:00)  HR: 48 (26 Aug 2019 07:20) (48 - 107)  BP: 95/57 (26 Aug 2019 06:00) (77/44 - 121/66)  BP(mean): 69 (26 Aug 2019 06:00) (55 - 85)  ABP: --  ABP(mean): --  RR: 12 (26 Aug 2019 06:00) (12 - 23)  SpO2: 100% (26 Aug 2019 07:20) (94% - 100%)    Mode: AC/ CMV (Assist Control/ Continuous Mandatory Ventilation), RR (machine): 12, TV (machine): 360, FiO2: 30, PEEP: 5, MAP: 10, PIP: 31    08-25 @ 07:01  -  08-26 @ 07:00  --------------------------------------------------------  IN: 1596.3 mL / OUT: 2000 mL / NET: -403.7 mL      CAPILLARY BLOOD GLUCOSE      POCT Blood Glucose.: 74 mg/dL (26 Aug 2019 04:52)      PHYSICAL EXAM:    General: NAD, intubated, eyes open, does not answer questions   HEENT: NCAT, PERRL, clear conjunctiva, mmm  Neck: supple, no JVD  Respiratory: CTAB, decreased breath sound in L lower lobe   Cardiovascular: RRR, S1S2, no m/r/g  Abdomen: soft, nontender, nondistended, normal bowel sounds  Extremities: no edema or cyanosis  Skin: normal color and turgor; no rash  Neurological: nonfocal    MEDICATIONS:  MEDICATIONS  (STANDING):  chlorhexidine 0.12% Liquid 15 milliLiter(s) Oral Mucosa every 12 hours  chlorhexidine 4% Liquid 1 Application(s) Topical <User Schedule>  dexmedetomidine Infusion 0.2 MICROgram(s)/kG/Hr (3.415 mL/Hr) IV Continuous <Continuous>  lacosamide 100 milliGRAM(s) Oral two times a day  levETIRAcetam  IVPB 1000 milliGRAM(s) IV Intermittent <User Schedule>  OLANZapine 7.5 milliGRAM(s) Oral at bedtime  petrolatum Ophthalmic Ointment 1 Application(s) Both EYES two times a day  valproate sodium IVPB 750 milliGRAM(s) IV Intermittent every 8 hours    MEDICATIONS  (PRN):  LORazepam   Injectable 2 milliGRAM(s) IV Push every 6 hours PRN Seizure > 3 minutes or GTC      ALLERGIES:  Allergies    Topamax (Unknown)    Intolerances        LABS:                        9.2    6.63  )-----------( 74       ( 26 Aug 2019 03:00 )             28.8     08-26    142  |  110<H>  |  14  ----------------------------<  64<L>  3.7   |  21<L>  |  0.52    Ca    8.0<L>      26 Aug 2019 03:00  Phos  4.7     08-26  Mg     1.9     08-26    TPro  6.1  /  Alb  2.3<L>  /  TBili  0.2  /  DBili  x   /  AST  20  /  ALT  11  /  AlkPhos  50  08-26          RADIOLOGY & ADDITIONAL TESTS: Reviewed.

## 2019-08-26 NOTE — PROGRESS NOTE ADULT - ATTENDING COMMENTS
Patient examined and care reviewed in detail on bedside rounds  Critically ill on vent with aspiration/seizures Fails SBT today  Frequent bedside visits with therapy change today.   I have personally provided 35+ minutes of critical care time concurrently with the resident/fellow; this excludes time spent on separate procedures.

## 2019-08-26 NOTE — PROGRESS NOTE ADULT - ASSESSMENT
Ms. Ponce is a 54F with a PMH of cerebral palsy and seizure disorder presenting from group home for acute AMS and increased work of breathing with recent seizure yesterday found to be hypothermic, hypotensive and bradycardic with leukocytosis and imaging showing new right lung opacification concerning for septic shock 2/2 to aspiration PNA requiring pressors and airway support    NEURO: Patient has a history of cerebral palsy and seizure disorder. Baseline mental status minimally verbal but interactive/alert and withdraws to pain. Neurology on board. Lethargic currently 2/2 to metabolic encephalopathy due to infection.  - Intubated due to poor mental status s/p seizures concern for possible status epilepticus and hypercarbic respiratory failure  - Will treat for infection as detailed below to improve mental status   - Neurology recs appreciated  - Ativan 2mg PRN for seizures > 3 minutes or GTC when off sedation  - On Zyprexa 7.5 mg QD holding for now as patient is sedated  -Potential epileptogenic focus in the right fronto-temporal region shown on VEEG  -Spoke with Neuro in regards to pt's AEDs, will start patient on home dose Depakote 750mg TID and Keppra 1000mg TID   -F/u Depakote and Keppra level   -Restart patient on home zyprexa     CARDIOVASCULAR: No history of cardiac disease. However found to be hypotensive and bradycardic in the setting of sepsis. Pressor support improved SBPs and bradycardia.  -Off pressors    PULMONARY: Hypothermic with lung imaging concerning for new right lung opacification. On POCUS see signs of consolidation likely aspiration PNA due to seizure yesterday.  - S/p intubation in MICU for poor mental status and inability to maintain airway, as well as hypercarbic respiratory failure pCO2s 50s to 70s  - When plan to extubate will need steroids to minimize vocal cord edema  - Aspiration precautions   -Repeat ABG improved   -Sputum Cx no growth   -Off sedation, apneic on CPAP trial, will attempt again     GI: Patient has a history of enteritis in the past but no GI symptoms currently. Eats pureed foods.  - OGT feeding initiated   - Aspiration precautions     RENAL:   - Purewick catheter in place with clear urine output  - Replete electrolytes PRN    INFECTIOUS DISEASE: New right lung opacification with hypothermia and leukocytosis meeting SIRS criteria with presumable source aspiration PNA complicated by hypotension 2/2 to septic shock. UA negative.   - If patient remains on high doses of pressor support will consider CT chest/abdomen/pelvis to rule out occult source of infection given also has a recent history of enteritis requiring MICU admission  - Follow up blood cultures  -D/c'd vancomycin, will continue on zosyn for aspiration pneumonia with last day 8/23  -Legionella negative  - CBCs with diff daily  -Sputum cultures showed no growth for 24h    HEMATOLOGY: has thrombocytopenia but has had thrombocytopenia in the past could be 2/2 to infection  - CBCs with diff daily  -Worsening thrombocytopenia, d/c'd heparin for now, possible cause Depakote went down on patient's Depakote dose, will trend platelets     ENDOCRINE:  - No active issues    SKIN:  - Has 2+ pitting edema of the feet likely due to immobility  - Few blanching pressure wounds on back of feet and sacral area     DVT PPx:  - D/C'd heparin for worsening thrombocytopenia, SCDs only     Code Status: Full Ms. Ponce is a 54F with a PMH of cerebral palsy and seizure disorder presenting from group home for acute AMS and increased work of breathing with recent seizure yesterday found to be hypothermic, hypotensive and bradycardic with leukocytosis and imaging showing new right lung opacification concerning for septic shock 2/2 to aspiration PNA requiring pressors and airway support, now off pressors. trying to wean off vent support     NEURO: Patient has a history of cerebral palsy and seizure disorder. Baseline mental status minimally verbal but interactive/alert and withdraws to pain. Neurology on board. Lethargic currently 2/2 to metabolic encephalopathy due to infection.  - Intubated due to poor mental status s/p seizures concern for possible status epilepticus and hypercarbic respiratory failure  - Will treat for infection as detailed below to improve mental status   - Neurology recs appreciated  - Ativan 2mg PRN for seizures > 3 minutes or GTC when off sedation  -Potential epileptogenic focus in the right fronto-temporal region shown on VEEG  -Spoke with Neuro in regards to pt's AEDs, will start patient on home dose Depakote 750mg TID and Keppra 1000mg TID   -F/u Depakote and Keppra level   -Restart patient on home zyprexa     CARDIOVASCULAR: No history of cardiac disease. However found to be hypotensive and bradycardic in the setting of sepsis. Pressor support improved SBPs and bradycardia.  -Off pressors    PULMONARY: Hypothermic with lung imaging concerning for new right lung opacification. On POCUS see signs of consolidation likely aspiration PNA   - S/p intubation in MICU for poor mental status and inability to maintain airway, as well as hypercarbic respiratory failure pCO2s 50s to 70s  - When plan to extubate will need steroids to minimize vocal cord edema  - Aspiration precautions   -Sputum Cx no growth   -Off sedation, apneic on CPAP trial  -on rpt CPAP, patient has low tidal volume, right shift cardiac frame c/f atelectasis, will obtain CXR     GI: Patient has a history of enteritis in the past but no GI symptoms currently. Eats pureed foods.  - OGT feeding initiated   - Aspiration precautions     RENAL:   - Purewick catheter in place with clear urine output  - Replete electrolytes PRN    INFECTIOUS DISEASE: New right lung opacification with hypothermia and leukocytosis meeting SIRS criteria with presumable source aspiration PNA complicated by hypotension 2/2 to septic shock. UA negative.   - If patient remains on high doses of pressor support will consider CT chest/abdomen/pelvis to rule out occult source of infection given also has a recent history of enteritis requiring MICU admission  - Follow up blood cultures  -s/p zosyn for aspiration pneumonia with last day 8/23  -Legionella negative  -CBCs with diff daily  -Sputum cultures showed no growth for 24h    HEMATOLOGY: has thrombocytopenia but has had thrombocytopenia in the past could be 2/2 to infection  -Worsening thrombocytopenia, d/c'd heparin for now, possible cause Depakote went down on patient's Depakote dose, will trend platelets     ENDOCRINE:  - No active issues    SKIN:  - Few blanching pressure wounds on back of feet and sacral area     DVT PPx:  - D/C'd heparin for worsening thrombocytopenia, SCDs only     Code Status: Full

## 2019-08-26 NOTE — EEG REPORT - NS EEG TEXT BOX
[facility]   COMPREHENSIVE EPILEPSY CENTER   REPORT OF ROUTINE VIDEO EEG     [facility]: [address], [place] [address], Ph#: [phone]  [facility]: [address] [place] [address], Ph#: [phone]  [facility]: [address] [place] [address]    Patient Name: NAYANA JACQUES  Age and : 54y ([birth year])  MRN #: [id number]  Location: [place] [address]  Referring Physician: Roberth Renner    Study Date: 19    _____________________________________________________________  TECHNICAL INFORMATION    Placement and Labeling of Electrodes:  The EEG was performed utilizing 20 channels referential EEG connections (coronal over temporal over parasagittal montage) using all standard 10-20 electrode placements with EKG.  Recording was at a sampling rate of 256 samples per second per channel.  Time synchronized digital video recording was done simultaneously with EEG recording.  A low light infrared camera was used for low light recording.  Valerio and seizure detection algorithms were utilized.    _____________________________________________________________  HISTORY    Patient is a 54y old  Female who presents with a chief complaint of Septic Shock (26 Aug 2019 07:46)      PERTINENT MEDICATION:  lacosamide 100 milliGRAM(s) Oral two times a day  levETIRAcetam  IVPB 1000 milliGRAM(s) IV Intermittent <User Schedule>  valproate sodium IVPB 750 milliGRAM(s) IV Intermittent every 8 hours    _____________________________________________________________  STUDY INTERPRETATION    Findings: The background was continuous, spontaneously variable and reactive. No posterior dominant rhythm seen.    Background Slowing:  Diffuse theta and polymorphic delta slowing, at times quasi-rythmic activity in the right and left central hemispheres.    Focal Slowing:   None were present.    Sleep Background:  Drowsiness was characterized by fragmentation, attenuation, and slowing of the background activity.    Stage II sleep transients were not recorded.    Other Non-Epileptiform Findings:  None were present.    Interictal Epileptiform Activity:   None were present.      Events:  Clinical events: None recorded.  Seizures: None recorded.    Activation Procedures:   Hyperventilation was not performed.    Photic stimulation was not performed.     Artifacts:  Intermittent myogenic and movement artifacts were noted.    ECG:  The heart rate on single channel ECG was predominantly between 60-70 BPM.    _____________________________________________________________  EEG SUMMARY/CLASSIFICATION    Abnormal EEG in an unresponsive patient.  -  Moderate generalized slowing.    _____________________________________________________________  EEG IMPRESSION/CLINICAL CORRELATE    Abnormal EEG study.  1.Moderate nonspecific diffuse or multifocal cerebral dysfunction.   2. No epileptiform pattern or seizure seen.  _____________________________________________________________  Mireille Beckman DO  Epilepsy Fellow, [facility] [place]    Tex Skinner MD  Attending Physician, [facility] [place] [facility]   COMPREHENSIVE EPILEPSY CENTER   REPORT OF ROUTINE VIDEO EEG     [facility]: [address], [place] [address], Ph#: [phone]  [facility]: [address] [place] [address], Ph#: [phone]  [facility]: [address] [place] [address]    Patient Name: NAYANA JACQUES  Age and : 54y (67)  MRN #: [id number]  Location: [place] [address]  Referring Physician: Roberth Renner    Study Date: 19    _____________________________________________________________  TECHNICAL INFORMATION    Placement and Labeling of Electrodes:  The EEG was performed utilizing 20 channels referential EEG connections (coronal over temporal over parasagittal montage) using all standard 10-20 electrode placements with EKG.  Recording was at a sampling rate of 256 samples per second per channel.  Time synchronized digital video recording was done simultaneously with EEG recording.  A low light infrared camera was used for low light recording.  Valerio and seizure detection algorithms were utilized.    _____________________________________________________________  HISTORY    Patient is a 54y old  Female who presents with a chief complaint of Septic Shock (26 Aug 2019 07:46)      PERTINENT MEDICATION:  lacosamide 100 milliGRAM(s) Oral two times a day  levETIRAcetam  IVPB 1000 milliGRAM(s) IV Intermittent <User Schedule>  valproate sodium IVPB 750 milliGRAM(s) IV Intermittent every 8 hours    _____________________________________________________________  STUDY INTERPRETATION    Findings: The background was continuous, spontaneously variable and reactive. No posterior dominant rhythm seen.    Background Slowing:  Intermittent diffuse very brief to brief runs of quasi-rhythmic 3-4 Hz theta and polymorphic delta slowing, max in the midline region.    Focal Slowing:   None were present.    Sleep Background:  Drowsiness and stage II sleep transients were not recorded.    Other Non-Epileptiform Findings:  None were present.    Interictal Epileptiform Activity:   None were present.    Events:  Clinical events: None recorded.  Seizures: None recorded.    Activation Procedures:   Hyperventilation was not performed.    Photic stimulation was not performed.     Artifacts:  Intermittent myogenic and movement artifacts were noted.    ECG:  The heart rate on single channel ECG was predominantly between 60-70 BPM.    _____________________________________________________________  EEG SUMMARY/CLASSIFICATION    Abnormal EEG in awake state.  - Mild to moderate generalized slowing.    _____________________________________________________________  EEG IMPRESSION/CLINICAL CORRELATE    Abnormal EEG study.  1. Mild to moderate nonspecific diffuse or multifocal cerebral dysfunction.   2. No epileptiform pattern or seizure seen.  _____________________________________________________________  Mireille Beckman DO  Epilepsy Fellow, [facility] [place]    Tex Skinner MD  Attending Physician, [facility] [place]

## 2019-08-27 LAB
ALBUMIN SERPL ELPH-MCNC: 2.5 G/DL — LOW (ref 3.3–5)
ALP SERPL-CCNC: 58 U/L — SIGNIFICANT CHANGE UP (ref 40–120)
ALT FLD-CCNC: 14 U/L — SIGNIFICANT CHANGE UP (ref 4–33)
ANION GAP SERPL CALC-SCNC: 13 MMO/L — SIGNIFICANT CHANGE UP (ref 7–14)
AST SERPL-CCNC: 25 U/L — SIGNIFICANT CHANGE UP (ref 4–32)
BASOPHILS # BLD AUTO: 0.02 K/UL — SIGNIFICANT CHANGE UP (ref 0–0.2)
BASOPHILS NFR BLD AUTO: 0.3 % — SIGNIFICANT CHANGE UP (ref 0–2)
BILIRUB SERPL-MCNC: < 0.2 MG/DL — LOW (ref 0.2–1.2)
BUN SERPL-MCNC: 14 MG/DL — SIGNIFICANT CHANGE UP (ref 7–23)
CALCIUM SERPL-MCNC: 7.8 MG/DL — LOW (ref 8.4–10.5)
CHLORIDE SERPL-SCNC: 108 MMOL/L — HIGH (ref 98–107)
CO2 SERPL-SCNC: 22 MMOL/L — SIGNIFICANT CHANGE UP (ref 22–31)
CORTIS SERPL-MCNC: 8.4 UG/DL — SIGNIFICANT CHANGE UP (ref 2.7–18.4)
CREAT SERPL-MCNC: 0.44 MG/DL — LOW (ref 0.5–1.3)
EOSINOPHIL # BLD AUTO: 0.53 K/UL — HIGH (ref 0–0.5)
EOSINOPHIL NFR BLD AUTO: 9.2 % — HIGH (ref 0–6)
GLUCOSE SERPL-MCNC: 111 MG/DL — HIGH (ref 70–99)
HCT VFR BLD CALC: 29.5 % — LOW (ref 34.5–45)
HGB BLD-MCNC: 9.7 G/DL — LOW (ref 11.5–15.5)
IMM GRANULOCYTES NFR BLD AUTO: 2.3 % — HIGH (ref 0–1.5)
LEVETIRACETAM SERPL-MCNC: 93 MCG/ML — HIGH (ref 12–46)
LYMPHOCYTES # BLD AUTO: 2.09 K/UL — SIGNIFICANT CHANGE UP (ref 1–3.3)
LYMPHOCYTES # BLD AUTO: 36.3 % — SIGNIFICANT CHANGE UP (ref 13–44)
MAGNESIUM SERPL-MCNC: 1.8 MG/DL — SIGNIFICANT CHANGE UP (ref 1.6–2.6)
MCHC RBC-ENTMCNC: 32.9 % — SIGNIFICANT CHANGE UP (ref 32–36)
MCHC RBC-ENTMCNC: 33.8 PG — SIGNIFICANT CHANGE UP (ref 27–34)
MCV RBC AUTO: 102.8 FL — HIGH (ref 80–100)
MONOCYTES # BLD AUTO: 0.99 K/UL — HIGH (ref 0–0.9)
MONOCYTES NFR BLD AUTO: 17.2 % — HIGH (ref 2–14)
NEUTROPHILS # BLD AUTO: 2 K/UL — SIGNIFICANT CHANGE UP (ref 1.8–7.4)
NEUTROPHILS NFR BLD AUTO: 34.7 % — LOW (ref 43–77)
NRBC # FLD: 0.02 K/UL — SIGNIFICANT CHANGE UP (ref 0–0)
PHOSPHATE SERPL-MCNC: 5 MG/DL — HIGH (ref 2.5–4.5)
PLATELET # BLD AUTO: 93 K/UL — LOW (ref 150–400)
PMV BLD: 9.9 FL — SIGNIFICANT CHANGE UP (ref 7–13)
POTASSIUM SERPL-MCNC: 3.6 MMOL/L — SIGNIFICANT CHANGE UP (ref 3.5–5.3)
POTASSIUM SERPL-SCNC: 3.6 MMOL/L — SIGNIFICANT CHANGE UP (ref 3.5–5.3)
PROT SERPL-MCNC: 5.6 G/DL — LOW (ref 6–8.3)
RBC # BLD: 2.87 M/UL — LOW (ref 3.8–5.2)
RBC # FLD: 15.5 % — HIGH (ref 10.3–14.5)
SODIUM SERPL-SCNC: 143 MMOL/L — SIGNIFICANT CHANGE UP (ref 135–145)
TSH SERPL-MCNC: 3.9 UIU/ML — SIGNIFICANT CHANGE UP (ref 0.27–4.2)
WBC # BLD: 5.76 K/UL — SIGNIFICANT CHANGE UP (ref 3.8–10.5)
WBC # FLD AUTO: 5.76 K/UL — SIGNIFICANT CHANGE UP (ref 3.8–10.5)

## 2019-08-27 PROCEDURE — 99291 CRITICAL CARE FIRST HOUR: CPT

## 2019-08-27 PROCEDURE — 95951: CPT | Mod: 26

## 2019-08-27 RX ORDER — LACOSAMIDE 50 MG/1
100 TABLET ORAL
Refills: 0 | Status: DISCONTINUED | OUTPATIENT
Start: 2019-08-27 | End: 2019-08-29

## 2019-08-27 RX ORDER — MIDAZOLAM HYDROCHLORIDE 1 MG/ML
2 INJECTION, SOLUTION INTRAMUSCULAR; INTRAVENOUS ONCE
Refills: 0 | Status: DISCONTINUED | OUTPATIENT
Start: 2019-08-27 | End: 2019-08-27

## 2019-08-27 RX ORDER — ENOXAPARIN SODIUM 100 MG/ML
40 INJECTION SUBCUTANEOUS DAILY
Refills: 0 | Status: DISCONTINUED | OUTPATIENT
Start: 2019-08-27 | End: 2019-09-24

## 2019-08-27 RX ORDER — SODIUM CHLORIDE 9 MG/ML
500 INJECTION INTRAMUSCULAR; INTRAVENOUS; SUBCUTANEOUS ONCE
Refills: 0 | Status: COMPLETED | OUTPATIENT
Start: 2019-08-27 | End: 2019-08-27

## 2019-08-27 RX ORDER — CLOBAZAM 10 MG/1
10 TABLET ORAL
Refills: 0 | Status: DISCONTINUED | OUTPATIENT
Start: 2019-08-27 | End: 2019-08-28

## 2019-08-27 RX ORDER — LACOSAMIDE 50 MG/1
200 TABLET ORAL ONCE
Refills: 0 | Status: DISCONTINUED | OUTPATIENT
Start: 2019-08-27 | End: 2019-08-27

## 2019-08-27 RX ADMIN — Medication 28.75 MILLIGRAM(S): at 15:39

## 2019-08-27 RX ADMIN — CHLORHEXIDINE GLUCONATE 1 APPLICATION(S): 213 SOLUTION TOPICAL at 10:03

## 2019-08-27 RX ADMIN — SODIUM CHLORIDE 4 MILLILITER(S): 9 INJECTION INTRAMUSCULAR; INTRAVENOUS; SUBCUTANEOUS at 04:44

## 2019-08-27 RX ADMIN — CLOBAZAM 10 MILLIGRAM(S): 10 TABLET ORAL at 23:36

## 2019-08-27 RX ADMIN — Medication 2 MILLIGRAM(S): at 23:37

## 2019-08-27 RX ADMIN — SODIUM CHLORIDE 4 MILLILITER(S): 9 INJECTION INTRAMUSCULAR; INTRAVENOUS; SUBCUTANEOUS at 15:24

## 2019-08-27 RX ADMIN — Medication 28.75 MILLIGRAM(S): at 08:37

## 2019-08-27 RX ADMIN — Medication 1 APPLICATION(S): at 17:45

## 2019-08-27 RX ADMIN — LACOSAMIDE 140 MILLIGRAM(S): 50 TABLET ORAL at 14:27

## 2019-08-27 RX ADMIN — LEVETIRACETAM 400 MILLIGRAM(S): 250 TABLET, FILM COATED ORAL at 13:05

## 2019-08-27 RX ADMIN — CHLORHEXIDINE GLUCONATE 15 MILLILITER(S): 213 SOLUTION TOPICAL at 06:09

## 2019-08-27 RX ADMIN — Medication 2 MILLIGRAM(S): at 18:40

## 2019-08-27 RX ADMIN — CHLORHEXIDINE GLUCONATE 15 MILLILITER(S): 213 SOLUTION TOPICAL at 17:45

## 2019-08-27 RX ADMIN — OLANZAPINE 7.5 MILLIGRAM(S): 15 TABLET, FILM COATED ORAL at 23:20

## 2019-08-27 RX ADMIN — Medication 2 MILLIGRAM(S): at 23:36

## 2019-08-27 RX ADMIN — Medication 1 APPLICATION(S): at 06:09

## 2019-08-27 RX ADMIN — Medication 2 MILLIGRAM(S): at 13:57

## 2019-08-27 RX ADMIN — SODIUM CHLORIDE 500 MILLILITER(S): 9 INJECTION INTRAMUSCULAR; INTRAVENOUS; SUBCUTANEOUS at 16:10

## 2019-08-27 RX ADMIN — MIDAZOLAM HYDROCHLORIDE 2 MILLIGRAM(S): 1 INJECTION, SOLUTION INTRAMUSCULAR; INTRAVENOUS at 12:31

## 2019-08-27 RX ADMIN — LACOSAMIDE 100 MILLIGRAM(S): 50 TABLET ORAL at 06:09

## 2019-08-27 RX ADMIN — MIDAZOLAM HYDROCHLORIDE 2 MILLIGRAM(S): 1 INJECTION, SOLUTION INTRAMUSCULAR; INTRAVENOUS at 15:20

## 2019-08-27 RX ADMIN — SODIUM CHLORIDE 4 MILLILITER(S): 9 INJECTION INTRAMUSCULAR; INTRAVENOUS; SUBCUTANEOUS at 22:49

## 2019-08-27 RX ADMIN — SODIUM CHLORIDE 4 MILLILITER(S): 9 INJECTION INTRAMUSCULAR; INTRAVENOUS; SUBCUTANEOUS at 09:22

## 2019-08-27 RX ADMIN — LEVETIRACETAM 400 MILLIGRAM(S): 250 TABLET, FILM COATED ORAL at 19:43

## 2019-08-27 RX ADMIN — LEVETIRACETAM 400 MILLIGRAM(S): 250 TABLET, FILM COATED ORAL at 07:46

## 2019-08-27 RX ADMIN — Medication 28.75 MILLIGRAM(S): at 23:43

## 2019-08-27 RX ADMIN — LACOSAMIDE 100 MILLIGRAM(S): 50 TABLET ORAL at 23:20

## 2019-08-27 RX ADMIN — ENOXAPARIN SODIUM 40 MILLIGRAM(S): 100 INJECTION SUBCUTANEOUS at 12:32

## 2019-08-27 RX ADMIN — MIDAZOLAM HYDROCHLORIDE 2 MILLIGRAM(S): 1 INJECTION, SOLUTION INTRAMUSCULAR; INTRAVENOUS at 09:30

## 2019-08-27 NOTE — PROGRESS NOTE ADULT - SUBJECTIVE AND OBJECTIVE BOX
Hola Draper MD, PGY-1  Pager #[phone]  Spectra #[phone]  -------------------------------------------  INTERVAL HPI/OVERNIGHT EVENTS: Started on inhaled NaCl, chest PT given ?atelectasis of LLB, vEEG prelim read with several seizures, max duration 30 seconds.     SUBJECTIVE: Patient seen and examined at bedside. Limited, patient is intubated.     ICU Vital Signs Last 24 Hrs  T(F): 97 (27 Aug 2019 12:00), Max: 97.6 (27 Aug 2019 08:00)  HR: 82 (27 Aug 2019 13:00) (46 - 106)  BP: 84/56 (27 Aug 2019 13:00) (84/45 - 154/129)  BP(mean): 66 (27 Aug 2019 13:00) (58 - 139)  ABP: --  ABP(mean): --  RR: 24 (27 Aug 2019 13:00) (12 - 33)  SpO2: 96% (27 Aug 2019 13:00) (94% - 100%)    Mode: CPAP with PS  FiO2: 21  PEEP: 5  PS: 5  MAP: 8  PIP: 10    I&O's Summary    26 Aug 2019 07:01  -  27 Aug 2019 07:00  --------------------------------------------------------  IN: 1100 mL / OUT: 1350 mL / NET: -250 mL    27 Aug 2019 07:01  -  27 Aug 2019 13:35  --------------------------------------------------------  IN: 260 mL / OUT: 100 mL / NET: 160 mL    General: NAD, intubated, eyes open, does not answer questions   HEENT: NCAT, PERRL, clear conjunctiva, mmm  Neck: supple, no JVD  Respiratory: CTAB, decreased breath sound in L lower lobe   Cardiovascular: RRR, S1S2, no m/r/g  Abdomen: soft, nontender, nondistended, normal bowel sounds  Extremities: no edema or cyanosis  Skin: normal color and turgor; no rash  Neurological: nonfocal    MEDICATIONS  (STANDING):  chlorhexidine 0.12% Liquid 15 milliLiter(s) Oral Mucosa every 12 hours  chlorhexidine 4% Liquid 1 Application(s) Topical <User Schedule>  enoxaparin Injectable 40 milliGRAM(s) SubCutaneous daily  lacosamide 100 milliGRAM(s) Oral two times a day  levETIRAcetam  IVPB 1000 milliGRAM(s) IV Intermittent <User Schedule>  OLANZapine 7.5 milliGRAM(s) Oral at bedtime  petrolatum Ophthalmic Ointment 1 Application(s) Both EYES two times a day  sodium chloride 3%  Inhalation 4 milliLiter(s) Inhalation every 6 hours  valproate sodium IVPB 750 milliGRAM(s) IV Intermittent every 8 hours    MEDICATIONS  (PRN):  LORazepam   Injectable 2 milliGRAM(s) IV Push every 6 hours PRN Seizure > 3 minutes or GTC    Allergies    Topamax (Unknown)    Intolerances    LABS:                        9.7    5.76  )-----------( 93       ( 27 Aug 2019 05:00 )             29.5     08-27    143  |  108<H>  |  14  ----------------------------<  111<H>  3.6   |  22  |  0.44<L>    Ca    7.8<L>      27 Aug 2019 05:00  Phos  5.0     08-27  Mg     1.8     08-27    TPro  5.6<L>  /  Alb  2.5<L>  /  TBili  < 0.2<L>  /  DBili  x   /  AST  25  /  ALT  14  /  AlkPhos  58  08-27    ABG - ( 25 Aug 2019 22:30 )  pH, Arterial: 7.50  pH, Blood: x     /  pCO2: 30    /  pO2: 171   / HCO3: 25    / Base Excess: -0.1  /  SaO2: 99.6      Levetiracetam Level, Serum: 93.0 (08.25.19 @ 21:50)    Lactate Trend  08-25 @ 22:30 Lactate:1.0     RADIOLOGY & ADDITIONAL TESTS: Reviewed.

## 2019-08-27 NOTE — PROGRESS NOTE ADULT - ASSESSMENT
Ms. Ponce is a 54F with a PMH of cerebral palsy and seizure disorder presenting from group home for acute AMS and increased work of breathing with recent seizure yesterday found to be hypothermic, hypotensive and bradycardic with leukocytosis and imaging showing new right lung opacification concerning for septic shock 2/2 to aspiration PNA requiring pressors and airway support, now off pressors. trying to wean off vent support     NEURO: Patient has a history of cerebral palsy and seizure disorder. Baseline mental status minimally verbal but interactive/alert and withdraws to pain. Neurology on board. Lethargic currently 2/2 to metabolic encephalopathy due to infection.  - Intubated due to poor mental status s/p seizures concern for possible status epilepticus and hypercarbic respiratory failure  - Will treat for infection as detailed below to improve mental status   - Neurology recs appreciated  - Ativan 2mg PRN for seizures > 3 minutes or GTC when off sedation  -Potential epileptogenic focus in the right fronto-temporal region shown on VEEG  -Spoke with Neuro in regards to pt's AEDs, will start patient on home dose Depakote 750mg TID and Keppra 1000mg TID. Still with seizure activity. As per neuro, will load with vimpat 200 mg, 100 mg q8 for maintenance, and f/u AM vimpat level   -F/u Depakote and Keppra level   -Restart patient on home olanzapine    CARDIOVASCULAR: No history of cardiac disease. However found to be hypotensive and bradycardic in the setting of sepsis. Pressor support improved SBPs and bradycardia.  -Off pressors    PULMONARY: Hypothermic with lung imaging concerning for new right lung opacification. On POCUS see signs of consolidation likely aspiration PNA   - S/p intubation in MICU for poor mental status and inability to maintain airway, as well as hypercarbic respiratory failure pCO2s 50s to 70s  - When plan to extubate will need steroids to minimize vocal cord edema  - Aspiration precautions   -Sputum Cx no growth   -Off sedation, apneic on CPAP trial  -on rpt CPAP, patient has low tidal volume, right shift cardiac frame, suspect atelectasis of LLB, will attempt bronchoscopy to clear mucus plug if present    GI: Patient has a history of enteritis in the past but no GI symptoms currently. Eats pureed foods.  - OGT feeding initiated   - Aspiration precautions     RENAL:   - Purewick catheter in place with clear urine output  - Replete electrolytes PRN    INFECTIOUS DISEASE: New right lung opacification with hypothermia and leukocytosis meeting SIRS criteria with presumable source aspiration PNA complicated by hypotension 2/2 to septic shock. UA negative.   - If patient remains on high doses of pressor support will consider CT chest/abdomen/pelvis to rule out occult source of infection given also has a recent history of enteritis requiring MICU admission  - Follow up blood cultures  -s/p zosyn for aspiration pneumonia with last day 8/23  -Legionella negative  -CBCs with diff daily  -Sputum cultures showed no growth for 24h    HEMATOLOGY: has thrombocytopenia but has had thrombocytopenia in the past could be 2/2 to infection  -Worsening thrombocytopenia, d/c'd heparin for now, possible cause Depakote went down on patient's Depakote dose, will trend platelets     ENDOCRINE:  - No active issues    SKIN:  - Few blanching pressure wounds on back of feet and sacral area     DVT PPx:  - D/C'd heparin for worsening thrombocytopenia, SCDs only     Code Status: Full Ms. Ponce is a 54F with a PMH of cerebral palsy and seizure disorder presenting from group home for acute AMS and increased work of breathing with recent seizure yesterday found to be hypothermic, hypotensive and bradycardic with leukocytosis and imaging showing new right lung opacification concerning for septic shock 2/2 to aspiration PNA requiring pressors and airway support, now off pressors. trying to wean off vent support     NEURO: Patient has a history of cerebral palsy and seizure disorder. Baseline mental status minimally verbal but interactive/alert and withdraws to pain. Neurology on board. Lethargic currently 2/2 to metabolic encephalopathy due to infection.  - Intubated due to poor mental status s/p seizures concern for possible status epilepticus and hypercarbic respiratory failure  - Will treat for infection as detailed below to improve mental status   - Neurology recs appreciated  - Ativan 2mg PRN for seizures > 3 minutes or GTC when off sedation  -Potential epileptogenic focus in the right fronto-temporal region shown on VEEG  -Spoke with Neuro in regards to pt's AEDs, will start patient on home dose Depakote 750mg TID and Keppra 1000mg TID. Still with seizure activity. As per neuro, will load with vimpat 200 mg, 100 mg q8 for maintenance, and f/u AM vimpat level   -F/u Depakote and Keppra level   -Restart patient on home olanzapine    CARDIOVASCULAR: No history of cardiac disease. However found to be hypotensive and bradycardic in the setting of sepsis. Pressor support improved SBPs and bradycardia.  -Off pressors    PULMONARY: Hypothermic with lung imaging concerning for new right lung opacification. On POCUS see signs of consolidation likely aspiration PNA   - S/p intubation in MICU for poor mental status and inability to maintain airway, as well as hypercarbic respiratory failure pCO2s 50s to 70s  - When plan to extubate will need steroids to minimize vocal cord edema  - Aspiration precautions   -Sputum Cx no growth   -Off sedation, apneic on CPAP trial  -on rpt CPAP, patient has low tidal volume, right shift cardiac frame, suspect atelectasis of LLB, will attempt bronchoscopy to clear mucus plug if present    GI: Patient has a history of enteritis in the past but no GI symptoms currently. Eats pureed foods.  - OGT feeding initiated   - Aspiration precautions     RENAL:   - Purewick catheter in place with clear urine output  - Replete electrolytes PRN    INFECTIOUS DISEASE: New right lung opacification with hypothermia and leukocytosis meeting SIRS criteria with presumable source aspiration PNA complicated by hypotension 2/2 to septic shock. UA negative.   - If patient remains on high doses of pressor support will consider CT chest/abdomen/pelvis to rule out occult source of infection given also has a recent history of enteritis requiring MICU admission  - Follow up blood cultures  -s/p zosyn for aspiration pneumonia with last day 8/23  -Legionella negative  -CBCs with diff daily  -Sputum cultures showed no growth for 24h    HEMATOLOGY: has thrombocytopenia but has had thrombocytopenia in the past could be 2/2 to infection  -Worsening thrombocytopenia, d/c'd heparin for now, possible cause Depakote went down on patient's Depakote dose, will trend platelets     ENDOCRINE:  - No active issues    SKIN:  - Few blanching pressure wounds on back of feet and sacral area     DVT PPx:  - enoxaparin 40 mg subQ qd    Code Status: Full Ms. Ponce is a 54F with a PMH of cerebral palsy and seizure disorder presenting from group home for acute AMS and increased work of breathing with recent seizure yesterday found to be hypothermic, hypotensive and bradycardic with leukocytosis and imaging showing new right lung opacification concerning for septic shock 2/2 to aspiration PNA requiring pressors and airway support, now off pressors. trying to wean off vent support     NEURO: Patient has a history of cerebral palsy and seizure disorder. Baseline mental status minimally verbal but interactive/alert and withdraws to pain. Neurology on board. Lethargic currently 2/2 to metabolic encephalopathy due to infection.  - Intubated due to poor mental status s/p seizures concern for possible status epilepticus and hypercarbic respiratory failure  - Will treat for infection as detailed below to improve mental status   - Neurology recs appreciated  - Ativan 2mg PRN for seizures > 3 minutes or GTC when off sedation  -Potential epileptogenic focus in the right fronto-temporal region shown on VEEG  -Spoke with Neuro in regards to pt's AEDs: valproate 750mg TID, levetiracetam 1000mg TID, lacosamide 100 mg bid  -F/u Depakote and Keppra level   -Restart patient on home olanzapine    CARDIOVASCULAR: No history of cardiac disease. However found to be hypotensive and bradycardic in the setting of sepsis. Pressor support improved SBPs and bradycardia.  -Off pressors    PULMONARY: Hypothermic with lung imaging concerning for new right lung opacification. On POCUS see signs of consolidation likely aspiration PNA   - S/p intubation in MICU for poor mental status and inability to maintain airway, as well as hypercarbic respiratory failure pCO2s 50s to 70s  - When plan to extubate will need steroids to minimize vocal cord edema  - Aspiration precautions   -Sputum Cx no growth   -Off sedation, apneic on CPAP trial  -on rpt CPAP, patient has low tidal volume, right shift cardiac frame, suspect atelectasis of LLB, will attempt bronchoscopy to clear mucus plug if present    GI: Patient has a history of enteritis in the past but no GI symptoms currently. Eats pureed foods.  - OGT feeding initiated   - Aspiration precautions     RENAL:   - Purewick catheter in place with clear urine output  - Replete electrolytes PRN    INFECTIOUS DISEASE: New right lung opacification with hypothermia and leukocytosis meeting SIRS criteria with presumable source aspiration PNA complicated by hypotension 2/2 to septic shock. UA negative.   - If patient remains on high doses of pressor support will consider CT chest/abdomen/pelvis to rule out occult source of infection given also has a recent history of enteritis requiring MICU admission  - Follow up blood cultures  -s/p zosyn for aspiration pneumonia with last day 8/23  -Legionella negative  -CBCs with diff daily  -Sputum cultures showed no growth for 24h    HEMATOLOGY: has thrombocytopenia but has had thrombocytopenia in the past could be 2/2 to infection  -Worsening thrombocytopenia, d/c'd heparin for now, possible cause Depakote went down on patient's Depakote dose, will trend platelets     ENDOCRINE:  - No active issues    SKIN:  - Few blanching pressure wounds on back of feet and sacral area     DVT PPx:  - enoxaparin 40 mg subQ qd    Code Status: Full Ms. Ponce is a 54F with a PMH of cerebral palsy and seizure disorder presenting from group home for acute AMS and increased work of breathing with recent seizure yesterday found to be hypothermic, hypotensive and bradycardic with leukocytosis and imaging showing new right lung opacification concerning for septic shock 2/2 to aspiration PNA requiring pressors and airway support, now off pressors. trying to wean off vent support     NEURO: Patient has a history of cerebral palsy and seizure disorder. Baseline mental status minimally verbal but interactive/alert and withdraws to pain. Neurology on board. Lethargic currently 2/2 to metabolic encephalopathy due to infection.  - Intubated due to poor mental status s/p seizures concern for possible status epilepticus and hypercarbic respiratory failure  - Will treat for infection as detailed below to improve mental status   - Neurology recs appreciated  - Ativan 2mg PRN for seizures > 3 minutes or GTC when off sedation  -Potential epileptogenic focus in the right fronto-temporal region shown on VEEG  -Spoke with Neuro in regards to pt's AEDs: valproate 750mg TID, levetiracetam 1000mg TID. Load lacosamide 200 mg IV, [id number] for maintenance.   -F/u Depakote and Keppra level   -Restart patient on home olanzapine    CARDIOVASCULAR: No history of cardiac disease. However found to be hypotensive and bradycardic in the setting of sepsis. Pressor support improved SBPs and bradycardia.  -Off pressors    PULMONARY: Hypothermic with lung imaging concerning for new right lung opacification. On POCUS see signs of consolidation likely aspiration PNA   - S/p intubation in MICU for poor mental status and inability to maintain airway, as well as hypercarbic respiratory failure pCO2s 50s to 70s  - When plan to extubate will need steroids to minimize vocal cord edema  - Aspiration precautions   -Sputum Cx no growth   -Off sedation, apneic on CPAP trial  -on rpt CPAP, patient has low tidal volume, right shift cardiac frame, suspect atelectasis of LLB, will attempt bronchoscopy to clear mucus plug if present    GI: Patient has a history of enteritis in the past but no GI symptoms currently. Eats pureed foods.  - OGT feeding initiated   - Aspiration precautions     RENAL:   - Purewick catheter in place with clear urine output  - Replete electrolytes PRN    INFECTIOUS DISEASE: New right lung opacification with hypothermia and leukocytosis meeting SIRS criteria with presumable source aspiration PNA complicated by hypotension 2/2 to septic shock. UA negative.   - If patient remains on high doses of pressor support will consider CT chest/abdomen/pelvis to rule out occult source of infection given also has a recent history of enteritis requiring MICU admission  - Follow up blood cultures  -s/p zosyn for aspiration pneumonia with last day 8/23  -Legionella negative  -CBCs with diff daily  -Sputum cultures showed no growth for 24h    HEMATOLOGY: has thrombocytopenia but has had thrombocytopenia in the past could be 2/2 to infection  -Worsening thrombocytopenia, d/c'd heparin for now, possible cause Depakote went down on patient's Depakote dose, will trend platelets     ENDOCRINE:  - No active issues    SKIN:  - Few blanching pressure wounds on back of feet and sacral area     DVT PPx:  - enoxaparin 40 mg subQ qd    Code Status: Full

## 2019-08-27 NOTE — EEG REPORT - NS EEG TEXT BOX
[facility]   COMPREHENSIVE EPILEPSY CENTER   REPORT OF CONTINUOUS VIDEO EEG     [facility]: [address], [place] [address], Ph#: [phone]  [facility]: [address], [place] [address], Ph#: [phone]  [facility]: [address] [place] [address]    Patient Name: NAYANA JACQUES  Age and : 54y (67)  MRN #: [id number]  Location: [place] [address]  Referring Physician: Roberth Renner    Study Date: 19 - 19    _____________________________________________________________  HISTORY    Patient is a 54y old  Female who presents with a chief complaint of Septic Shock (26 Aug 2019 07:46)      PERTINENT MEDICATION:  lacosamide 100 milliGRAM(s) Oral two times a day  levETIRAcetam  IVPB 1000 milliGRAM(s) IV Intermittent <User Schedule>  valproate sodium IVPB 750 milliGRAM(s) IV Intermittent every 8 hours    _____________________________________________________________  STUDY INTERPRETATION    Findings: The background was continuous, spontaneously variable and reactive. No posterior dominant rhythm seen.    Background Slowing:  Intermittent diffuse quasi-rhythmic 4 Hz theta slowing, max in the midline region.    Focal Slowing:   None were present.    Sleep Background:  Drowsiness was characterized by fragmentation, attenuation, and slowing of the background activity.    Sleep was characterized by the presence of symmetric, rudimentary spindles and K-complexes.    Other Non-Epileptiform Findings:  None were present.    Interictal Epileptiform Activity:   None were present.    Events:  8 seizures recorded from 17:19 to 04:40, each lasting 30-60s in duration. Onset diffuse, with max either in midline, right or left hemisphere.  - Clinical: no gross change in behavior seen on video, though EMG artifacts seen on EEG  - EEG: Onset with diffuse LVFA, highest amplitude and best organized either midline, right or left hemisphere. Ictal pattern spread diffusely in bilateral hemispheres and evolved to admixture of rhythmic alpha and spikes.     Activation Procedures:   Hyperventilation was not performed.    Photic stimulation was not performed.     Artifacts:  Intermittent myogenic and movement artifacts were noted.    ECG:  The heart rate on single channel ECG was predominantly between 60-70 BPM.    _____________________________________________________________  EEG SUMMARY/CLASSIFICATION    Abnormal EEG in awake, drowsy and asleep states.    8 seizures recorded from 17:19 to 04:40, each lasting 30-60s in duration. Onset diffuse, with max either in midline, right or left hemisphere.  - Clinical: no gross change in behavior seen on video, though EMG artifacts seen on EEG  - EEG: Onset with diffuse LVFA, highest amplitude and best organized either midline, right or left hemisphere. Ictal pattern spread diffusely in bilateral hemispheres and evolved to admixture of rhythmic alpha and spikes.     - Mild to moderate generalized slowing.    _____________________________________________________________  EEG IMPRESSION/CLINICAL CORRELATE    Abnormal EEG study.  1. Eight seizures captured from 17:19 to 04:40, each lasting 30-60s in duration. No gross behavioral change seen on video. EEG onset was diffuse, best organized either in midline region, right hemisphere, or left hemisphere.   2. Mild to moderate nonspecific diffuse or multifocal cerebral dysfunction.     Findings were discussed with the primary team.    _____________________________________________________________    Tex Skinner MD  Attending Physician, [facility] [place]

## 2019-08-27 NOTE — PROGRESS NOTE ADULT - ATTENDING COMMENTS
Patient examined and care reviewed in detail on bedside rounds  Critically ill on vent with con'd seizure activity Neuro on close consult  Frequent bedside visits with therapy change today.   I have personally provided 35+ minutes of critical care time concurrently with the resident/fellow; this excludes time spent on separate procedures.

## 2019-08-27 NOTE — CHART NOTE - NSCHARTNOTEFT_GEN_A_CORE
Neurology was called for 8 overnight events captured on EEG. AED adjustment necessary  [] VPA corrected level 230 so no changes on VPA, will start to taper down as soon as the seizures are well controlled  [] Load with 200 vimpat IV now and change the dose to 100 mg TID  [] Will review over night EEG if still has seizures can add Onfi 10 mg BID

## 2019-08-28 LAB
ANION GAP SERPL CALC-SCNC: 13 MMO/L — SIGNIFICANT CHANGE UP (ref 7–14)
BUN SERPL-MCNC: 11 MG/DL — SIGNIFICANT CHANGE UP (ref 7–23)
CALCIUM SERPL-MCNC: 8 MG/DL — LOW (ref 8.4–10.5)
CHLORIDE SERPL-SCNC: 109 MMOL/L — HIGH (ref 98–107)
CO2 SERPL-SCNC: 21 MMOL/L — LOW (ref 22–31)
CREAT SERPL-MCNC: 0.48 MG/DL — LOW (ref 0.5–1.3)
GLUCOSE SERPL-MCNC: 71 MG/DL — SIGNIFICANT CHANGE UP (ref 70–99)
HCT VFR BLD CALC: 30.6 % — LOW (ref 34.5–45)
HGB BLD-MCNC: 9.6 G/DL — LOW (ref 11.5–15.5)
MAGNESIUM SERPL-MCNC: 1.9 MG/DL — SIGNIFICANT CHANGE UP (ref 1.6–2.6)
MCHC RBC-ENTMCNC: 31.4 % — LOW (ref 32–36)
MCHC RBC-ENTMCNC: 32.5 PG — SIGNIFICANT CHANGE UP (ref 27–34)
MCV RBC AUTO: 103.7 FL — HIGH (ref 80–100)
NRBC # FLD: 0.03 K/UL — SIGNIFICANT CHANGE UP (ref 0–0)
PHOSPHATE SERPL-MCNC: 4.1 MG/DL — SIGNIFICANT CHANGE UP (ref 2.5–4.5)
PLATELET # BLD AUTO: 117 K/UL — LOW (ref 150–400)
PMV BLD: 9.5 FL — SIGNIFICANT CHANGE UP (ref 7–13)
POTASSIUM SERPL-MCNC: 3.4 MMOL/L — LOW (ref 3.5–5.3)
POTASSIUM SERPL-SCNC: 3.4 MMOL/L — LOW (ref 3.5–5.3)
RBC # BLD: 2.95 M/UL — LOW (ref 3.8–5.2)
RBC # FLD: 16 % — HIGH (ref 10.3–14.5)
SODIUM SERPL-SCNC: 143 MMOL/L — SIGNIFICANT CHANGE UP (ref 135–145)
WBC # BLD: 6.55 K/UL — SIGNIFICANT CHANGE UP (ref 3.8–10.5)
WBC # FLD AUTO: 6.55 K/UL — SIGNIFICANT CHANGE UP (ref 3.8–10.5)

## 2019-08-28 PROCEDURE — 99291 CRITICAL CARE FIRST HOUR: CPT

## 2019-08-28 PROCEDURE — 99233 SBSQ HOSP IP/OBS HIGH 50: CPT | Mod: GC

## 2019-08-28 PROCEDURE — 95951: CPT | Mod: 26

## 2019-08-28 RX ORDER — MIDODRINE HYDROCHLORIDE 2.5 MG/1
20 TABLET ORAL ONCE
Refills: 0 | Status: COMPLETED | OUTPATIENT
Start: 2019-08-28 | End: 2019-08-28

## 2019-08-28 RX ORDER — POTASSIUM CHLORIDE 20 MEQ
20 PACKET (EA) ORAL ONCE
Refills: 0 | Status: COMPLETED | OUTPATIENT
Start: 2019-08-28 | End: 2019-08-28

## 2019-08-28 RX ORDER — MIDAZOLAM HYDROCHLORIDE 1 MG/ML
0.02 INJECTION, SOLUTION INTRAMUSCULAR; INTRAVENOUS
Qty: 100 | Refills: 0 | Status: DISCONTINUED | OUTPATIENT
Start: 2019-08-28 | End: 2019-08-28

## 2019-08-28 RX ORDER — SODIUM CHLORIDE 9 MG/ML
500 INJECTION INTRAMUSCULAR; INTRAVENOUS; SUBCUTANEOUS ONCE
Refills: 0 | Status: COMPLETED | OUTPATIENT
Start: 2019-08-28 | End: 2019-08-28

## 2019-08-28 RX ADMIN — CHLORHEXIDINE GLUCONATE 15 MILLILITER(S): 213 SOLUTION TOPICAL at 17:22

## 2019-08-28 RX ADMIN — Medication 28.75 MILLIGRAM(S): at 08:03

## 2019-08-28 RX ADMIN — LACOSAMIDE 100 MILLIGRAM(S): 50 TABLET ORAL at 13:34

## 2019-08-28 RX ADMIN — CHLORHEXIDINE GLUCONATE 1 APPLICATION(S): 213 SOLUTION TOPICAL at 15:02

## 2019-08-28 RX ADMIN — LEVETIRACETAM 400 MILLIGRAM(S): 250 TABLET, FILM COATED ORAL at 13:34

## 2019-08-28 RX ADMIN — Medication 2 MILLIGRAM(S): at 14:30

## 2019-08-28 RX ADMIN — SODIUM CHLORIDE 4 MILLILITER(S): 9 INJECTION INTRAMUSCULAR; INTRAVENOUS; SUBCUTANEOUS at 16:15

## 2019-08-28 RX ADMIN — Medication 3.42 MG/KG/HR: at 15:01

## 2019-08-28 RX ADMIN — Medication 1 APPLICATION(S): at 05:53

## 2019-08-28 RX ADMIN — MIDODRINE HYDROCHLORIDE 20 MILLIGRAM(S): 2.5 TABLET ORAL at 21:40

## 2019-08-28 RX ADMIN — Medication 28.75 MILLIGRAM(S): at 23:44

## 2019-08-28 RX ADMIN — CLOBAZAM 10 MILLIGRAM(S): 10 TABLET ORAL at 05:52

## 2019-08-28 RX ADMIN — Medication 28.75 MILLIGRAM(S): at 15:03

## 2019-08-28 RX ADMIN — SODIUM CHLORIDE 4 MILLILITER(S): 9 INJECTION INTRAMUSCULAR; INTRAVENOUS; SUBCUTANEOUS at 09:11

## 2019-08-28 RX ADMIN — MIDAZOLAM HYDROCHLORIDE 1.37 MG/KG/HR: 1 INJECTION, SOLUTION INTRAMUSCULAR; INTRAVENOUS at 10:50

## 2019-08-28 RX ADMIN — Medication 2 MILLIGRAM(S): at 10:09

## 2019-08-28 RX ADMIN — SODIUM CHLORIDE 4 MILLILITER(S): 9 INJECTION INTRAMUSCULAR; INTRAVENOUS; SUBCUTANEOUS at 04:54

## 2019-08-28 RX ADMIN — CHLORHEXIDINE GLUCONATE 15 MILLILITER(S): 213 SOLUTION TOPICAL at 05:53

## 2019-08-28 RX ADMIN — LEVETIRACETAM 400 MILLIGRAM(S): 250 TABLET, FILM COATED ORAL at 07:03

## 2019-08-28 RX ADMIN — Medication 2 MILLIGRAM(S): at 06:05

## 2019-08-28 RX ADMIN — LACOSAMIDE 100 MILLIGRAM(S): 50 TABLET ORAL at 05:52

## 2019-08-28 RX ADMIN — Medication 20 MILLIEQUIVALENT(S): at 05:56

## 2019-08-28 RX ADMIN — Medication 1 APPLICATION(S): at 17:22

## 2019-08-28 RX ADMIN — OLANZAPINE 7.5 MILLIGRAM(S): 15 TABLET, FILM COATED ORAL at 21:40

## 2019-08-28 RX ADMIN — SODIUM CHLORIDE 1000 MILLILITER(S): 9 INJECTION INTRAMUSCULAR; INTRAVENOUS; SUBCUTANEOUS at 02:05

## 2019-08-28 RX ADMIN — LEVETIRACETAM 400 MILLIGRAM(S): 250 TABLET, FILM COATED ORAL at 19:35

## 2019-08-28 RX ADMIN — Medication 3.42 MG/KG/HR: at 19:39

## 2019-08-28 RX ADMIN — SODIUM CHLORIDE 4 MILLILITER(S): 9 INJECTION INTRAMUSCULAR; INTRAVENOUS; SUBCUTANEOUS at 22:33

## 2019-08-28 RX ADMIN — ENOXAPARIN SODIUM 40 MILLIGRAM(S): 100 INJECTION SUBCUTANEOUS at 13:34

## 2019-08-28 RX ADMIN — LACOSAMIDE 100 MILLIGRAM(S): 50 TABLET ORAL at 21:41

## 2019-08-28 NOTE — PROGRESS NOTE ADULT - SUBJECTIVE AND OBJECTIVE BOX
INTERVAL HISTORY: Patient interviewed and examined at the bedside on the morning of 8/28/19. Patient currently intubated and sedated. Opens eyes to verbal stimuli. In no acute distress    PAST MEDICAL & SURGICAL HISTORY:  Intellectual disability  Constipation  Seizure disorder  Cerebral palsy  No significant past surgical history    FAMILY HISTORY:  No pertinent family history in first degree relatives      MEDICATIONS (HOME):  Home Medications:  albuterol 2.5 mg/3 mL (0.083%) inhalation solution: 3 milliliter(s) inhaled every 6 hours, As Needed (19 Aug 2019 02:11)  budesonide 0.5 mg/2 mL inhalation suspension: 2 milliliter(s) inhaled 2 times a day (19 Aug 2019 02:11)  calcium carbonate 400 mg oral tablet, chewable: 1 tab(s) orally once a day (19 Aug 2019 02:11)  clindamycin 1% topical lotion: Apply topically to affected area 2 times a day, As Needed for Acne Rosacea (19 Aug 2019 02:11)  ketoconazole topical:  (19 Aug 2019 02:11)  MetroGel 1% topical gel: Apply topically to affected area once a day (19 Aug 2019 02:11)  MiraLax oral powder for reconstitution: 17 gram(s) orally once a day (19 Aug 2019 02:11)  nystatin topical:  (19 Aug 2019 02:11)  Senna 8.6 mg oral tablet: 2 tab(s) orally once a day (at bedtime) (19 Aug 2019 02:11)  Vitamin B-100 oral tablet: 1 tab(s) orally once a day (19 Aug 2019 02:11)    MEDICATIONS  (STANDING):  chlorhexidine 0.12% Liquid 15 milliLiter(s) Oral Mucosa every 12 hours  chlorhexidine 4% Liquid 1 Application(s) Topical <User Schedule>  Clobazam 10 milliGRAM(s) Oral two times a day  enoxaparin Injectable 40 milliGRAM(s) SubCutaneous daily  lacosamide 100 milliGRAM(s) Oral <User Schedule>  levETIRAcetam  IVPB 1000 milliGRAM(s) IV Intermittent <User Schedule>  midazolam Infusion 0.02 mG/kG/Hr (1.366 mL/Hr) IV Continuous <Continuous>  OLANZapine 7.5 milliGRAM(s) Oral at bedtime  petrolatum Ophthalmic Ointment 1 Application(s) Both EYES two times a day  sodium chloride 3%  Inhalation 4 milliLiter(s) Inhalation every 6 hours  valproate sodium IVPB 750 milliGRAM(s) IV Intermittent every 8 hours    MEDICATIONS  (PRN):  LORazepam   Injectable 2 milliGRAM(s) IV Push every 6 hours PRN Seizure > 3 minutes or GTC    ALLERGIES/INTOLERANCES:  Allergies  Topamax (Unknown)    Intolerances    VITALS & EXAMINATION:  Vital Signs Last 24 Hrs  T(C): 35.3 (28 Aug 2019 12:00), Max: 36.3 (27 Aug 2019 16:00)  T(F): 95.5 (28 Aug 2019 12:00), Max: 97.4 (27 Aug 2019 20:00)  HR: 55 (28 Aug 2019 13:00) (55 - 108)  BP: 99/61 (28 Aug 2019 13:00) (77/43 - 127/68)  BP(mean): 73 (28 Aug 2019 13:00) (51 - 87)  RR: 12 (28 Aug 2019 13:00) (10 - 24)  SpO2: 99% (28 Aug 2019 13:00) (93% - 100%)    General:  Constitutional: Female, appears stated age, in no apparent distress including pain  Head: Normocephalic & atraumatic.    Neurological (>12):  MS: Awake, alert. Arouses to verbal stimuli, currently intubated and on sedation    CNs: PERRLA (R = 3mm, L = 3mm). VFF. EOM currently not crossing midline towards left, no nystagmus, well developed masseter muscles b/l. No facial asymmetry b/l, full eye closure strength b/l.  Symmetric palate elevation in midline. Gag reflex deferred. Head turning & shoulder shrug intact b/l. Tongue midline, normal movements, no atrophy.    Fundoscopic: pale w/ sharp discs margins No vascular changes.      Motor: Normal muscle bulk & tone. No noticeable tremor, LE BL contracted, unable to assess full motor function given intubation.    Sensation: Withdraws to pain in RUE  Cortical: Extinction on DSS (neglect): none    Reflexes:              Biceps(C5)       BR(C6)              Patellar(L4)    Achilles(S1)    Plantar Resp  R	2	          2	          		        2		    2		Down   L	3	          3	          		        2		    2		Down       LABORATORY:  CBC                       9.6    6.55  )-----------( 117      ( 28 Aug 2019 03:00 )             30.6     Chem 08-28    143  |  109<H>  |  11  ----------------------------<  71  3.4<L>   |  21<L>  |  0.48<L>    Ca    8.0<L>      28 Aug 2019 03:00  Phos  4.1     08-28  Mg     1.9     08-28    TPro  5.6<L>  /  Alb  2.5<L>  /  TBili  < 0.2<L>  /  DBili  x   /  AST  25  /  ALT  14  /  AlkPhos  58  08-27    LFTs LIVER FUNCTIONS - ( 27 Aug 2019 05:00 )  Alb: 2.5 g/dL / Pro: 5.6 g/dL / ALK PHOS: 58 u/L / ALT: 14 u/L / AST: 25 u/L / GGT: x             Radiology (XR, CT, MR, U/S, TTE/SOY):    < from: CT Head No Cont (08.19.19 @ 00:17) >  IMPRESSION:     No acute intracranial hemorrhage, large cortical infarct or mass effect.   Additional findings as described. If clinically indicated, short-term   follow-up or MRI may be obtained for further evaluation.    < end of copied text >    Abnormal EEG study.  1. A total of 15 seizures captured from 16:00 to 09:00, each lasting 30-60s in duration. There was prolonged seizure freedom from 18:40 to 20:40 post lorazepam administration and from 01:20 to 04:00 post clobazam administration. No gross behavioral change seen on video. EEG onset was diffuse, at times with right or left hemispheric predominance.  2. Moderate nonspecific diffuse or multifocal cerebral dysfunction.       Today's study worsened compared to prior 24hr recording due to increase in seizure burden.    Findings were discussed with Neurology service.

## 2019-08-28 NOTE — PROGRESS NOTE ADULT - ASSESSMENT
54y White F with PMH of seizure disorder, CP, intellectual disability who presents with break through seizures and pneumonia.  Likely her breakthrough seizures are due to combination of current infection, recent AED change, Lithium toxicity.  Was hemodynamically unstable on initial evaluation with septic shock.  Further records on [facility] hospital stay with her status epilepticus admission needed-request records.     Plan  -Keep prior home dose of Keppra 1000 q8h  -Valproic acid daily dose 750 mg q8h   -Onfi 10mg bid po  -Vimpat 100mg tid   -Continue Versed for sedation  - Ween off ativan for sedation  -Correct electrolyte and infectious derangements.   -Ativan 2mg IVP for seizure lasting >3 minutes or GTC.

## 2019-08-28 NOTE — PROGRESS NOTE ADULT - ASSESSMENT
Ms. Ponce is a 54F with a PMH of cerebral palsy and seizure disorder presenting from group home for acute AMS and increased work of breathing with recent seizure yesterday found to be hypothermic, hypotensive and bradycardic with leukocytosis and imaging showing new right lung opacification concerning for septic shock 2/2 to aspiration PNA requiring pressors and airway support, now off pressors. trying to wean off vent support     NEURO: Patient has a history of cerebral palsy and seizure disorder. Baseline mental status minimally verbal but interactive/alert and withdraws to pain. Neurology on board. Lethargic currently 2/2 to metabolic encephalopathy due to infection.  - Intubated due to poor mental status s/p seizures concern for possible status epilepticus and hypercarbic respiratory failure  - Will treat for infection as detailed below to improve mental status   - Neurology recs appreciated  - Ativan 2mg PRN for seizures > 3 minutes or GTC when off sedation  -Potential epileptogenic focus in the right fronto-temporal region shown on VEEG  -Spoke with Neuro in regards to pt's AEDs: valproate 750mg TID, levetiracetam 1000mg TID. Load lacosamide 200 mg IV, [id number] for maintenance.   -F/u Depakote and Keppra level   -Restart patient on home olanzapine    CARDIOVASCULAR: No history of cardiac disease. However found to be hypotensive and bradycardic in the setting of sepsis. Pressor support improved SBPs and bradycardia.  -Off pressors    PULMONARY: Hypothermic with lung imaging concerning for new right lung opacification. On POCUS see signs of consolidation likely aspiration PNA   - S/p intubation in MICU for poor mental status and inability to maintain airway, as well as hypercarbic respiratory failure pCO2s 50s to 70s  - When plan to extubate will need steroids to minimize vocal cord edema  - Aspiration precautions   -Sputum Cx no growth   -Off sedation, apneic on CPAP trial  -on rpt CPAP, patient has low tidal volume, right shift cardiac frame, suspect atelectasis of LLB, will attempt bronchoscopy to clear mucus plug if present    GI: Patient has a history of enteritis in the past but no GI symptoms currently. Eats pureed foods.  - OGT feeding initiated   - Aspiration precautions     RENAL:   - Purewick catheter in place with clear urine output  - Replete electrolytes PRN    INFECTIOUS DISEASE: New right lung opacification with hypothermia and leukocytosis meeting SIRS criteria with presumable source aspiration PNA complicated by hypotension 2/2 to septic shock. UA negative.   - If patient remains on high doses of pressor support will consider CT chest/abdomen/pelvis to rule out occult source of infection given also has a recent history of enteritis requiring MICU admission  -Follow up blood cultures  -s/p zosyn for aspiration pneumonia with last day 8/23  -Legionella negative  -CBCs with diff daily  -Sputum cultures showed no growth for 24h    HEMATOLOGY: has thrombocytopenia but has had thrombocytopenia in the past could be 2/2 to infection  -Worsening thrombocytopenia, d/c'd heparin for now, possible cause Depakote went down on patient's Depakote dose, will trend platelets     ENDOCRINE:  - No active issues    SKIN:  - Few blanching pressure wounds on back of feet and sacral area     DVT PPx:  - enoxaparin 40 mg subQ qd    Code Status: Full Ms. Ponce is a 54F with a PMH of cerebral palsy and seizure disorder presenting from group home for acute AMS and increased work of breathing with recent seizure yesterday found to be hypothermic, hypotensive and bradycardic with leukocytosis and imaging showing new right lung opacification concerning for septic shock 2/2 to aspiration PNA requiring pressors and airway support, now off pressors. trying to wean off vent support     NEURO: Patient has a history of cerebral palsy and seizure disorder. Baseline mental status minimally verbal but interactive/alert and withdraws to pain. Neurology on board. Lethargic currently 2/2 to metabolic encephalopathy due to infection.  -Intubated due to poor mental status s/p seizures concern for possible status epilepticus and hypercarbic respiratory failure  -Ativan 2mg PRN for seizures > 3 minutes or GTC when off sedation  -Potential epileptogenic focus in the right fronto-temporal region shown on VEEG  -Spoke with Neuro in regards to pt's AEDs: valproate 750mg TID, levetiracetam 1000mg TID. Load lacosamide 200 mg IV, [id number] for maintenance. Clobazam 10 mg po bid.   -Lorazepam gtt for seizures with RASS target -2 to -3  -Restarted patient on home olanzapine    CARDIOVASCULAR: No history of cardiac disease. However found to be hypotensive and bradycardic in the setting of sepsis. Pressor support improved SBPs and bradycardia.  -Off pressors    PULMONARY: Hypothermic with lung imaging concerning for new right lung opacification. On POCUS see signs of consolidation likely aspiration PNA   - S/p intubation in MICU for poor mental status and inability to maintain airway, as well as hypercarbic respiratory failure pCO2s 50s to 70s  - When plan to extubate will need steroids to minimize vocal cord edema  - Aspiration precautions   -Sputum Cx no growth   -Off sedation, apneic on CPAP trial  -on rpt CPAP, patient has low tidal volume, right shift cardiac frame, suspect atelectasis of LLB, will attempt bronchoscopy to clear mucus plug if present    GI: Patient has a history of enteritis in the past but no GI symptoms currently. Eats pureed foods.  - OGT feeding initiated   - Aspiration precautions     RENAL:   - Purewick catheter in place with clear urine output  - Replete electrolytes PRN    INFECTIOUS DISEASE: New right lung opacification with hypothermia and leukocytosis meeting SIRS criteria with presumable source aspiration PNA complicated by hypotension 2/2 to septic shock. UA negative.   - If patient remains on high doses of pressor support will consider CT chest/abdomen/pelvis to rule out occult source of infection given also has a recent history of enteritis requiring MICU admission  -Follow up blood cultures  -s/p zosyn for aspiration pneumonia with last day 8/23  -Legionella negative  -CBCs with diff daily  -Sputum cultures showed no growth for 24h    HEMATOLOGY: has thrombocytopenia but has had thrombocytopenia in the past could be 2/2 to infection  -Worsening thrombocytopenia, d/c'd heparin for now, possible cause Depakote went down on patient's Depakote dose, will trend platelets     ENDOCRINE:  - No active issues    SKIN:  - Few blanching pressure wounds on back of feet and sacral area     DVT PPx:  - enoxaparin 40 mg subQ qd    Code Status: Full

## 2019-08-28 NOTE — PROGRESS NOTE ADULT - SUBJECTIVE AND OBJECTIVE BOX
Hola Draper MD, PGY-1  Pager #[phone]  Spectra #[phone]  -------------------------------------------  INTERVAL HPI/OVERNIGHT EVENTS: Was hypotensive yesterday 77/44 likely 2/2 held feeds/fwf for possible procedure, responded appropriately to 500cc bolus.     SUBJECTIVE: Patient seen and examined at bedside. Limited, patient is intubated.     OBJECTIVE:  ICU Vital Signs Last 24 Hrs  T(F): 97.4 (28 Aug 2019 04:00), Max: 97.6 (27 Aug 2019 08:00)  HR: 66 (28 Aug 2019 07:30) (57 - 108)  BP: 106/59 (28 Aug 2019 07:00) (77/43 - 127/68)  BP(mean): 74 (28 Aug 2019 07:00) (51 - 85)  ABP: --  ABP(mean): --  RR: 12 (28 Aug 2019 07:00) (10 - 28)  SpO2: 100% (28 Aug 2019 07:30) (93% - 100%)    Mode: AC/ CMV (Assist Control/ Continuous Mandatory Ventilation)  RR (machine): 12  TV (machine): 360  FiO2: 21  PEEP: 5  MAP: 8  PIP: 24    I&O's Summary    27 Aug 2019 07:01  -  28 Aug 2019 07:00  --------------------------------------------------------  IN: 1800 mL / OUT: 1050 mL / NET: 750 mL    General: NAD, intubated, eyes open, does not answer questions   HEENT: NCAT, PERRL, clear conjunctiva, mmm  Neck: supple, no JVD  Respiratory: CTAB, decreased breath sound in L lower lobe   Cardiovascular: RRR, S1S2, no m/r/g  Abdomen: soft, nontender, nondistended, normal bowel sounds  Extremities: no edema or cyanosis  Skin: normal color and turgor; no rash  Neurological: nonfocal    MEDICATIONS  (STANDING):  chlorhexidine 0.12% Liquid 15 milliLiter(s) Oral Mucosa every 12 hours  chlorhexidine 4% Liquid 1 Application(s) Topical <User Schedule>  Clobazam 10 milliGRAM(s) Oral two times a day  enoxaparin Injectable 40 milliGRAM(s) SubCutaneous daily  lacosamide 100 milliGRAM(s) Oral <User Schedule>  levETIRAcetam  IVPB 1000 milliGRAM(s) IV Intermittent <User Schedule>  OLANZapine 7.5 milliGRAM(s) Oral at bedtime  petrolatum Ophthalmic Ointment 1 Application(s) Both EYES two times a day  sodium chloride 3%  Inhalation 4 milliLiter(s) Inhalation every 6 hours  valproate sodium IVPB 750 milliGRAM(s) IV Intermittent every 8 hours    MEDICATIONS  (PRN):  LORazepam   Injectable 2 milliGRAM(s) IV Push every 6 hours PRN Seizure > 3 minutes or GTC    Allergies    Topamax (Unknown)    Intolerances    LABS:                        9.6    6.55  )-----------( 117      ( 28 Aug 2019 03:00 )             30.6     08-28    143  |  109<H>  |  11  ----------------------------<  71  3.4<L>   |  21<L>  |  0.48<L>    Ca    8.0<L>      28 Aug 2019 03:00  Phos  4.1     08-28  Mg     1.9     08-28    TPro  5.6<L>  /  Alb  2.5<L>  /  TBili  < 0.2<L>  /  DBili  x   /  AST  25  /  ALT  14  /  AlkPhos  58  08-27    Lactate Trend  08-25 @ 22:30 Lactate:1.0     CAPILLARY BLOOD GLUCOSE  POCT Blood Glucose.: 103 mg/dL (26 Aug 2019 07:51)    RADIOLOGY & ADDITIONAL TESTS: Reviewed.

## 2019-08-28 NOTE — PROGRESS NOTE ADULT - ATTENDING COMMENTS
Patient examined and care reviewed in detail on bedside rounds  Critically ill on vent with persistent seizures Fails SBT  Frequent bedside visits with therapy change today.   I have personally provided 35+ minutes of critical care time concurrently with the resident/fellow; this excludes time spent on separate procedures.

## 2019-08-28 NOTE — PROGRESS NOTE ADULT - ATTENDING COMMENTS
Pt seen and examined at bedside. Eyes opening spontaneously, looking around but not tracking, tearing. She has contractures on the legs and wrists, no hyperreflexia, PERRL. EEG at bedside appears reactive but with slowing, no seizures seen.     agree with plan as above. Pt with refractory epilepsy.

## 2019-08-28 NOTE — EEG REPORT - NS EEG TEXT BOX
[facility]   COMPREHENSIVE EPILEPSY CENTER   REPORT OF CONTINUOUS VIDEO EEG     [facility]: [address], [place] [address], Ph#: [phone]  [facility]: [address] [address], Ph#: [phone]  [facility]: [address] [place] [address]    Patient Name: NAYANA JACQUES  Age and : 54y ([birth year])  MRN #: [id number]  Location: [place] [address]  Referring Physician: Roberth Renner    Study Date: 19 - 19    _____________________________________________________________  HISTORY    Patient is a 54y old  Female who presents with a chief complaint of Septic Shock (26 Aug 2019 07:46)      PERTINENT MEDICATION:  Clobazam 10 milliGRAM(s) Oral two times a day  lacosamide 100 milliGRAM(s) Oral <User Schedule>  levETIRAcetam  IVPB 1000 milliGRAM(s) IV Intermittent <User Schedule>  LORazepam   Injectable 2 milliGRAM(s) given at 18:40  valproate sodium IVPB 750 milliGRAM(s) IV Intermittent every 8 hours    _____________________________________________________________  STUDY INTERPRETATION    Findings: The background was continuous, spontaneously variable and reactive. No posterior dominant rhythm seen.    Background Slowing:  Intermittent diffuse quasi-rhythmic 4 Hz theta slowing, max in the midline region.    Focal Slowing:   None were present.    Sleep Background:  Drowsiness was characterized by fragmentation, attenuation, and slowing of the background activity.    Sleep was characterized by the presence of symmetric, rudimentary spindles and K-complexes.    Other Non-Epileptiform Findings:  None were present.    Interictal Epileptiform Activity:   None were present.    Events:  Multiple seizures recorded from 16:00 to 18:40 about 1-2 per hour, then brief improvement after ativan was given with no seizures up until 20:40. Pt then began to having 1-2 seizure per hour up until 1:30 am then seizure free until about 04:00, returning to seizures about 2-3 per hour,  each lasting 30-60s in duration. Onset diffuse, with max either in midline, right or left hemisphere.  - Clinical: no gross change in behavior seen on video, though EMG artifacts seen on EEG  - EEG: Onset with diffuse LVFA, highest amplitude and best organized either midline, right or left hemisphere. Ictal pattern spread diffusely in bilateral hemispheres and evolved to admixture of rhythmic alpha and spikes.     Activation Procedures:   Hyperventilation was not performed.    Photic stimulation was not performed.     Artifacts:  Intermittent myogenic and movement artifacts were noted.    ECG:  The heart rate on single channel ECG was predominantly between 60-70 BPM.    _____________________________________________________________  EEG SUMMARY/CLASSIFICATION    Abnormal EEG in awake, drowsy and asleep states.    Multiple seizures recorded from 16:00 to 18:40 about 1-2 per hour, then brief improvement after ativan was given with no seizures up until 20:40. Pt then began to having 1-2 seizure per hour up until 1:30 am then seizure free until about 04:00, returning to seizures about 2-3 per hour,  each lasting 30-60s in duration. Onset diffuse, with max either in midline, right or left hemisphere.  - Clinical: no gross change in behavior seen on video, though EMG artifacts seen on EEG  - EEG: Onset with diffuse LVFA, highest amplitude and best organized either midline, right or left hemisphere. Ictal pattern spread diffusely in bilateral hemispheres and evolved to admixture of rhythmic alpha and spikes.     - Mild to moderate generalized slowing.    _____________________________________________________________  EEG IMPRESSION/CLINICAL CORRELATE    ***PRELIM REPORT***  Abnormal EEG study.  1. 15 total seizures captured from about 16:00 19 to 09:00 19 each lasting 30-60s in duration with seizure freedom from 18:40 to 20:40 post ativan and from 01:20 to 04:00. No gross behavioral change seen on video. EEG onset was diffuse, best organized either in midline region, right hemisphere, or left hemisphere.   2. Mild to moderate nonspecific diffuse or multifocal cerebral dysfunction.     Findings were discussed with the primary team.    _____________________________________________________________  Mireille Beckman DO  Epilepsy Fellow, [facility] [place]    Tex Skinner MD  Attending Physician, [facility] [place] [facility]   COMPREHENSIVE EPILEPSY CENTER   REPORT OF CONTINUOUS VIDEO EEG     [facility]: [address], [place] [address], Ph#: [phone]  [facility]: [address] [place] [address], Ph#: [phone]  [facility]: [address] [place] [address]    Patient Name: NAYANA JACQUES  Age and : 54y (67)  MRN #: [id number]  Location: [place] [address]  Referring Physician: Roberth Renner    Study Date: 19 - 19    _____________________________________________________________  HISTORY    Patient is a 54y old  Female who presents with a chief complaint of Septic Shock (26 Aug 2019 07:46)      PERTINENT MEDICATION:  Clobazam 10 milliGRAM(s) Oral two times a day  lacosamide 100 milliGRAM(s) Oral <User Schedule>  levETIRAcetam  IVPB 1000 milliGRAM(s) IV Intermittent <User Schedule>  LORazepam   Injectable 2 milliGRAM(s) given at 18:40  valproate sodium IVPB 750 milliGRAM(s) IV Intermittent every 8 hours    _____________________________________________________________  STUDY INTERPRETATION    Findings: The background was continuous, spontaneously variable and reactive. No posterior dominant rhythm seen.    Background Slowing:  Intermittent diffuse quasi-rhythmic 4 Hz theta slowing, max in the midline region.    Focal Slowing:   None were present.    Sleep Background:  Drowsiness was characterized by fragmentation, attenuation, and slowing of the background activity.    Sleep was characterized by the presence of symmetric, rudimentary spindles and K-complexes.    Other Non-Epileptiform Findings:  None were present.    Interictal Epileptiform Activity:   None were present.    Events:  Multiple seizures recorded from 16:00 to 18:40 about 1-2 per hour, then brief improvement after ativan was given with no seizures up until 20:40. Pt then began to having 1-2 seizure per hour up until 1:30 am then seizure free until about 04:00, returning to seizures about 2-3 per hour,  each lasting 30-60s in duration. Onset diffuse, at times with right or left hemispheric predominance.  - Clinical: no gross change in behavior seen on video  - EEG: Onset with diffuse LVFA, at times with higher amplitude and better organized in the right or left hemisphere. Ictal pattern increased in amplitude and evolved to admixture of alpha and beta activities     Activation Procedures:   Hyperventilation was not performed.    Photic stimulation was not performed.     Artifacts:  Intermittent myogenic and movement artifacts were noted.    ECG:  The heart rate on single channel ECG was predominantly between 60-70 BPM.    _____________________________________________________________  EEG SUMMARY/CLASSIFICATION    Abnormal EEG in awake, drowsy and asleep states.    Multiple seizures recorded from 16:00 to 18:40 about 1-2 per hour, then brief improvement after ativan was given with no seizures up until 20:40. Pt then began to having 1-2 seizure per hour up until 1:30 am then seizure free until about 04:00, returning to seizures about 2-3 per hour,  each lasting 30-60s in duration. Onset diffuse, at times with right or left hemispheric predominance.  - Clinical: no gross change in behavior seen on video  - EEG: Onset with diffuse LVFA, at times with higher amplitude and better organized in the right or left hemisphere. Ictal pattern increased in amplitude and evolved to admixture of alpha and beta activities     - Moderate generalized slowing.    _____________________________________________________________  EEG IMPRESSION/CLINICAL CORRELATE    Abnormal EEG study.  1. 15 total seizures captured from about 16:00 19 to 09:00 19 each lasting 30-60s in duration with seizure freedom from 18:40 to 20:40 post ativan and from 01:20 to 04:00. No gross behavioral change seen on video. EEG onset was diffuse, best organized either in midline region, right hemisphere, or left hemisphere.   2. Moderate nonspecific diffuse or multifocal cerebral dysfunction.       Today's study worsened compared to prior 24hr recording due to increase in seizure burden.    Findings were discussed with Neurology service.    _____________________________________________________________  Mireille Beckman DO  Epilepsy Fellow, [facility] [place]    Tex Skinner MD  Attending Physician, [facility] [place] [facility]   COMPREHENSIVE EPILEPSY CENTER   REPORT OF CONTINUOUS VIDEO EEG     [facility]: [address], [place] [address], Ph#: [phone]  [facility]: [address] [address], Ph#: [phone]  [facility]: [address] [place] [address]    Patient Name: NAYANA JACQUES  Age and : 54y (67)  MRN #: [id number]  Location: [place] [address]  Referring Physician: Roberth Renner    Study Date: 19 - 19    _____________________________________________________________  HISTORY    Patient is a 54y old  Female who presents with a chief complaint of Septic Shock (26 Aug 2019 07:46)      PERTINENT MEDICATION:  Clobazam 10 milliGRAM(s) Oral two times a day  lacosamide 100 milliGRAM(s) Oral <User Schedule>  levETIRAcetam  IVPB 1000 milliGRAM(s) IV Intermittent <User Schedule>  LORazepam   Injectable 2 milliGRAM(s) given at 18:40  valproate sodium IVPB 750 milliGRAM(s) IV Intermittent every 8 hours    _____________________________________________________________  STUDY INTERPRETATION    Findings: The background was continuous, spontaneously variable and reactive. No posterior dominant rhythm seen.    Background Slowing:  Intermittent diffuse quasi-rhythmic 4 Hz theta slowing, max in the midline region.    Focal Slowing:   None were present.    Sleep Background:  Drowsiness was characterized by fragmentation, attenuation, and slowing of the background activity.    Sleep was characterized by the presence of symmetric, rudimentary spindles and K-complexes.    Other Non-Epileptiform Findings:  None were present.    Interictal Epileptiform Activity:   None were present.    Events:  Multiple seizures recorded from 16:00 to 18:40 about 1-2 per hour, then brief improvement after ativan was given with no seizures up until 20:40. Pt then began to having 1-2 seizure per hour up until 1:30 am then seizure free until about 04:00, returning to seizures about 2-3 per hour,  each lasting 30-60s in duration. Onset diffuse, at times with right or left hemispheric predominance.  - Clinical: no gross change in behavior seen on video  - EEG: Onset with diffuse LVFA, at times with higher amplitude and better organized in the right or left hemisphere. Ictal pattern increased in amplitude and evolved to admixture of alpha and beta activities     Activation Procedures:   Hyperventilation was not performed.    Photic stimulation was not performed.     Artifacts:  Intermittent myogenic and movement artifacts were noted.    ECG:  The heart rate on single channel ECG was predominantly between 60-70 BPM.    _____________________________________________________________  EEG SUMMARY/CLASSIFICATION    Abnormal EEG in awake, drowsy and asleep states.    Multiple seizures recorded from 16:00 to 18:40 about 1-2 per hour, then brief improvement after ativan was given with no seizures up until 20:40. Pt then began to having 1-2 seizure per hour up until 1:30 am then seizure free until about 04:00, returning to seizures about 2-3 per hour,  each lasting 30-60s in duration. Onset diffuse, at times with right or left hemispheric predominance.  - Clinical: no gross change in behavior seen on video  - EEG: Onset with diffuse LVFA, at times with higher amplitude and better organized in the right or left hemisphere. Ictal pattern increased in amplitude and evolved to admixture of alpha and beta activities     - Moderate generalized slowing.    _____________________________________________________________  EEG IMPRESSION/CLINICAL CORRELATE    Abnormal EEG study.  1. A total of 15 seizures captured from 16:00 to 09:00, each lasting 30-60s in duration. There was prolonged seizure freedom from 18:40 to 20:40 post lorazepam administration and from 01:20 to 04:00 post clobazam administration. No gross behavioral change seen on video. EEG onset was diffuse, at times with right or left hemispheric predominance.  2. Moderate nonspecific diffuse or multifocal cerebral dysfunction.       Today's study worsened compared to prior 24hr recording due to increase in seizure burden.    Findings were discussed with Neurology service.    _____________________________________________________________  Mireille Beckman DO  Epilepsy Fellow, [facility] [place]    Tex Skinner MD  Attending Physician, [facility] [place]

## 2019-08-29 LAB
ALBUMIN SERPL ELPH-MCNC: 2.3 G/DL — LOW (ref 3.3–5)
ALP SERPL-CCNC: 56 U/L — SIGNIFICANT CHANGE UP (ref 40–120)
ALT FLD-CCNC: 14 U/L — SIGNIFICANT CHANGE UP (ref 4–33)
ANION GAP SERPL CALC-SCNC: 12 MMO/L — SIGNIFICANT CHANGE UP (ref 7–14)
AST SERPL-CCNC: 28 U/L — SIGNIFICANT CHANGE UP (ref 4–32)
BILIRUB SERPL-MCNC: < 0.2 MG/DL — LOW (ref 0.2–1.2)
BUN SERPL-MCNC: 11 MG/DL — SIGNIFICANT CHANGE UP (ref 7–23)
CALCIUM SERPL-MCNC: 8.3 MG/DL — LOW (ref 8.4–10.5)
CHLORIDE SERPL-SCNC: 110 MMOL/L — HIGH (ref 98–107)
CO2 SERPL-SCNC: 21 MMOL/L — LOW (ref 22–31)
CREAT SERPL-MCNC: 0.56 MG/DL — SIGNIFICANT CHANGE UP (ref 0.5–1.3)
GLUCOSE SERPL-MCNC: 89 MG/DL — SIGNIFICANT CHANGE UP (ref 70–99)
HCT VFR BLD CALC: 30 % — LOW (ref 34.5–45)
HGB BLD-MCNC: 9.7 G/DL — LOW (ref 11.5–15.5)
MAGNESIUM SERPL-MCNC: 2 MG/DL — SIGNIFICANT CHANGE UP (ref 1.6–2.6)
MCHC RBC-ENTMCNC: 32.3 % — SIGNIFICANT CHANGE UP (ref 32–36)
MCHC RBC-ENTMCNC: 33.7 PG — SIGNIFICANT CHANGE UP (ref 27–34)
MCV RBC AUTO: 104.2 FL — HIGH (ref 80–100)
NRBC # FLD: 0.14 K/UL — SIGNIFICANT CHANGE UP (ref 0–0)
NRBC FLD-RTO: 2 — SIGNIFICANT CHANGE UP
OSMOLALITY SERPL: 304 MOSMO/KG — HIGH (ref 275–295)
PHOSPHATE SERPL-MCNC: 4.5 MG/DL — SIGNIFICANT CHANGE UP (ref 2.5–4.5)
PLATELET # BLD AUTO: 176 K/UL — SIGNIFICANT CHANGE UP (ref 150–400)
PMV BLD: 9.6 FL — SIGNIFICANT CHANGE UP (ref 7–13)
POTASSIUM SERPL-MCNC: 3.7 MMOL/L — SIGNIFICANT CHANGE UP (ref 3.5–5.3)
POTASSIUM SERPL-SCNC: 3.7 MMOL/L — SIGNIFICANT CHANGE UP (ref 3.5–5.3)
PROT SERPL-MCNC: 6.1 G/DL — SIGNIFICANT CHANGE UP (ref 6–8.3)
RBC # BLD: 2.88 M/UL — LOW (ref 3.8–5.2)
RBC # FLD: 16.5 % — HIGH (ref 10.3–14.5)
SODIUM SERPL-SCNC: 143 MMOL/L — SIGNIFICANT CHANGE UP (ref 135–145)
WBC # BLD: 6.92 K/UL — SIGNIFICANT CHANGE UP (ref 3.8–10.5)
WBC # FLD AUTO: 6.92 K/UL — SIGNIFICANT CHANGE UP (ref 3.8–10.5)

## 2019-08-29 PROCEDURE — 99291 CRITICAL CARE FIRST HOUR: CPT

## 2019-08-29 PROCEDURE — 71045 X-RAY EXAM CHEST 1 VIEW: CPT | Mod: 26

## 2019-08-29 PROCEDURE — 71045 X-RAY EXAM CHEST 1 VIEW: CPT | Mod: 26,77

## 2019-08-29 PROCEDURE — 95951: CPT | Mod: 26

## 2019-08-29 RX ORDER — ALBUMIN HUMAN 25 %
250 VIAL (ML) INTRAVENOUS ONCE
Refills: 0 | Status: COMPLETED | OUTPATIENT
Start: 2019-08-29 | End: 2019-08-29

## 2019-08-29 RX ORDER — SODIUM CHLORIDE 9 MG/ML
500 INJECTION INTRAMUSCULAR; INTRAVENOUS; SUBCUTANEOUS ONCE
Refills: 0 | Status: COMPLETED | OUTPATIENT
Start: 2019-08-29 | End: 2019-08-29

## 2019-08-29 RX ORDER — CLOBAZAM 10 MG/1
10 TABLET ORAL
Refills: 0 | Status: DISCONTINUED | OUTPATIENT
Start: 2019-08-29 | End: 2019-09-05

## 2019-08-29 RX ORDER — NOREPINEPHRINE BITARTRATE/D5W 8 MG/250ML
0.05 PLASTIC BAG, INJECTION (ML) INTRAVENOUS
Qty: 8 | Refills: 0 | Status: DISCONTINUED | OUTPATIENT
Start: 2019-08-29 | End: 2019-08-30

## 2019-08-29 RX ORDER — LACOSAMIDE 50 MG/1
100 TABLET ORAL
Refills: 0 | Status: DISCONTINUED | OUTPATIENT
Start: 2019-08-29 | End: 2019-09-05

## 2019-08-29 RX ORDER — CLOBAZAM 10 MG/1
10 TABLET ORAL
Refills: 0 | Status: DISCONTINUED | OUTPATIENT
Start: 2019-08-29 | End: 2019-08-29

## 2019-08-29 RX ADMIN — SODIUM CHLORIDE 4 MILLILITER(S): 9 INJECTION INTRAMUSCULAR; INTRAVENOUS; SUBCUTANEOUS at 16:02

## 2019-08-29 RX ADMIN — CLOBAZAM 10 MILLIGRAM(S): 10 TABLET ORAL at 18:27

## 2019-08-29 RX ADMIN — SODIUM CHLORIDE 4 MILLILITER(S): 9 INJECTION INTRAMUSCULAR; INTRAVENOUS; SUBCUTANEOUS at 21:31

## 2019-08-29 RX ADMIN — Medication 3.42 MG/KG/HR: at 08:39

## 2019-08-29 RX ADMIN — Medication 6.4 MICROGRAM(S)/KG/MIN: at 20:26

## 2019-08-29 RX ADMIN — SODIUM CHLORIDE 4 MILLILITER(S): 9 INJECTION INTRAMUSCULAR; INTRAVENOUS; SUBCUTANEOUS at 04:21

## 2019-08-29 RX ADMIN — CHLORHEXIDINE GLUCONATE 15 MILLILITER(S): 213 SOLUTION TOPICAL at 05:36

## 2019-08-29 RX ADMIN — CHLORHEXIDINE GLUCONATE 1 APPLICATION(S): 213 SOLUTION TOPICAL at 06:04

## 2019-08-29 RX ADMIN — SODIUM CHLORIDE 500 MILLILITER(S): 9 INJECTION INTRAMUSCULAR; INTRAVENOUS; SUBCUTANEOUS at 16:30

## 2019-08-29 RX ADMIN — Medication 1 APPLICATION(S): at 05:38

## 2019-08-29 RX ADMIN — LEVETIRACETAM 400 MILLIGRAM(S): 250 TABLET, FILM COATED ORAL at 08:38

## 2019-08-29 RX ADMIN — Medication 3.42 MG/KG/HR: at 05:37

## 2019-08-29 RX ADMIN — Medication 28.75 MILLIGRAM(S): at 16:46

## 2019-08-29 RX ADMIN — Medication 28.75 MILLIGRAM(S): at 09:58

## 2019-08-29 RX ADMIN — Medication 1 APPLICATION(S): at 18:27

## 2019-08-29 RX ADMIN — LEVETIRACETAM 400 MILLIGRAM(S): 250 TABLET, FILM COATED ORAL at 14:33

## 2019-08-29 RX ADMIN — LACOSAMIDE 100 MILLIGRAM(S): 50 TABLET ORAL at 14:33

## 2019-08-29 RX ADMIN — Medication 3.42 MG/KG/HR: at 20:26

## 2019-08-29 RX ADMIN — Medication 125 MILLILITER(S): at 20:53

## 2019-08-29 RX ADMIN — LACOSAMIDE 100 MILLIGRAM(S): 50 TABLET ORAL at 22:18

## 2019-08-29 RX ADMIN — OLANZAPINE 7.5 MILLIGRAM(S): 15 TABLET, FILM COATED ORAL at 22:19

## 2019-08-29 RX ADMIN — SODIUM CHLORIDE 4 MILLILITER(S): 9 INJECTION INTRAMUSCULAR; INTRAVENOUS; SUBCUTANEOUS at 09:30

## 2019-08-29 RX ADMIN — ENOXAPARIN SODIUM 40 MILLIGRAM(S): 100 INJECTION SUBCUTANEOUS at 12:17

## 2019-08-29 RX ADMIN — CHLORHEXIDINE GLUCONATE 15 MILLILITER(S): 213 SOLUTION TOPICAL at 18:27

## 2019-08-29 RX ADMIN — LACOSAMIDE 100 MILLIGRAM(S): 50 TABLET ORAL at 05:36

## 2019-08-29 RX ADMIN — LEVETIRACETAM 400 MILLIGRAM(S): 250 TABLET, FILM COATED ORAL at 20:26

## 2019-08-29 NOTE — PROGRESS NOTE ADULT - ASSESSMENT
Ms. Ponce is a 54F with a PMH of cerebral palsy and seizure disorder presenting from group home for acute AMS and increased work of breathing with recent seizure yesterday found to be hypothermic, hypotensive and bradycardic with leukocytosis and imaging showing new right lung opacification concerning for septic shock 2/2 to aspiration PNA requiring pressors and airway support, now off pressors. trying to wean off vent support     NEURO: Patient has a history of cerebral palsy and seizure disorder. Baseline mental status minimally verbal but interactive/alert and withdraws to pain. Neurology on board. Lethargic currently 2/2 to metabolic encephalopathy due to infection.  -Intubated due to poor mental status s/p seizures concern for possible status epilepticus and hypercarbic respiratory failure  -Ativan 2mg PRN for seizures > 3 minutes or GTC when off sedation  -Potential epileptogenic focus in the right fronto-temporal region shown on VEEG  -Spoke with Neuro in regards to pt's AEDs: valproate 750mg TID, levetiracetam 1000mg TID. Load lacosamide 200 mg IV, [id number] for maintenance. Clobazam 10 mg po bid.   -Lorazepam gtt for seizures with RASS target -2 to -3  -Restarted patient on home olanzapine    CARDIOVASCULAR: No history of cardiac disease. However found to be hypotensive and bradycardic in the setting of sepsis. Pressor support improved SBPs and bradycardia.  -Off pressors    PULMONARY: Hypothermic with lung imaging concerning for new right lung opacification. On POCUS see signs of consolidation likely aspiration PNA   - S/p intubation in MICU for poor mental status and inability to maintain airway, as well as hypercarbic respiratory failure pCO2s 50s to 70s  - When plan to extubate will need steroids to minimize vocal cord edema  - Aspiration precautions   -Sputum Cx no growth   -Off sedation, apneic on CPAP trial  -on rpt CPAP, patient has low tidal volume, right shift cardiac frame, suspect atelectasis of LLB, will attempt bronchoscopy to clear mucus plug if present    GI: Patient has a history of enteritis in the past but no GI symptoms currently. Eats pureed foods.  - OGT feeding initiated   - Aspiration precautions     RENAL:   - Purewick catheter in place with clear urine output  - Replete electrolytes PRN    INFECTIOUS DISEASE: New right lung opacification with hypothermia and leukocytosis meeting SIRS criteria with presumable source aspiration PNA complicated by hypotension 2/2 to septic shock. UA negative.   - If patient remains on high doses of pressor support will consider CT chest/abdomen/pelvis to rule out occult source of infection given also has a recent history of enteritis requiring MICU admission  -Follow up blood cultures  -s/p zosyn for aspiration pneumonia with last day 8/23  -Legionella negative  -CBCs with diff daily  -Sputum cultures showed no growth for 24h    HEMATOLOGY: has thrombocytopenia but has had thrombocytopenia in the past could be 2/2 to infection  -Worsening thrombocytopenia, d/c'd heparin for now, possible cause Depakote went down on patient's Depakote dose, will trend platelets     ENDOCRINE:  - No active issues    SKIN:  - Few blanching pressure wounds on back of feet and sacral area     DVT PPx:  - enoxaparin 40 mg subQ qd    Code Status: Full Ms. Ponce is a 54F with a PMH of cerebral palsy and seizure disorder presenting from group home for acute AMS and increased work of breathing with recent seizure yesterday found to be hypothermic, hypotensive and bradycardic with leukocytosis and imaging showing new right lung opacification concerning for septic shock 2/2 to aspiration PNA requiring pressors and airway support, now off pressors. trying to wean off vent support     NEURO: Patient has a history of cerebral palsy and seizure disorder. Baseline mental status minimally verbal but interactive/alert and withdraws to pain. Neurology on board. Lethargic currently 2/2 to metabolic encephalopathy due to infection.  -Intubated due to poor mental status s/p seizures concern for possible status epilepticus and hypercarbic respiratory failure  -Ativan 2mg PRN for seizures > 3 minutes or GTC when off sedation  -Potential epileptogenic focus in the right fronto-temporal region shown on VEEG  -Spoke with Neuro in regards to pt's AEDs: valproate 750mg TID, levetiracetam 1000mg TID. Load lacosamide 200 mg IV, [id number] for maintenance.    -Lorazepam gtt for seizures with RASS target -2 to -3  -Restarted patient on home olanzapine    CARDIOVASCULAR: No history of cardiac disease. However found to be hypotensive and bradycardic in the setting of sepsis. Pressor support improved SBPs and bradycardia.  -Off pressors    PULMONARY: Hypothermic with lung imaging concerning for new right lung opacification. On POCUS see signs of consolidation likely aspiration PNA   - S/p intubation in MICU for poor mental status and inability to maintain airway, as well as hypercarbic respiratory failure pCO2s 50s to 70s  - When plan to extubate will need steroids to minimize vocal cord edema  - Aspiration precautions   -Sputum Cx no growth   -Off sedation, apneic on CPAP trial  -on rpt CPAP, patient has low tidal volume, right shift cardiac frame, suspect atelectasis of LLB, may attempt bronchoscopy to clear mucus plug if present    GI: Patient has a history of enteritis in the past but no GI symptoms currently. Eats pureed foods.  - OGT feeding initiated   - Aspiration precautions     RENAL:   - Purewick catheter in place with clear urine output  - Replete electrolytes PRN    INFECTIOUS DISEASE: New right lung opacification with hypothermia and leukocytosis meeting SIRS criteria with presumable source aspiration PNA complicated by hypotension 2/2 to septic shock. UA negative.   - If patient remains on high doses of pressor support will consider CT chest/abdomen/pelvis to rule out occult source of infection given also has a recent history of enteritis requiring MICU admission  -Follow up blood cultures  -s/p zosyn for aspiration pneumonia with last day 8/23  -Legionella negative  -CBCs with diff daily  -Sputum cultures showed no growth for 24h    HEMATOLOGY: has thrombocytopenia but has had thrombocytopenia in the past could be 2/2 to infection  -Worsening thrombocytopenia, d/c'd heparin for now, possible cause Depakote went down on patient's Depakote dose, will trend platelets     ENDOCRINE:  - No active issues    SKIN:  - Few blanching pressure wounds on back of feet and sacral area     DVT PPx:  - enoxaparin 40 mg subQ qd    Code Status: Full

## 2019-08-29 NOTE — PROGRESS NOTE ADULT - SUBJECTIVE AND OBJECTIVE BOX
Hola Draper MD, PGY-1  Pager #[phone]  Spectra #[phone]  -------------------------------------------  INTERVAL HPI/OVERNIGHT EVENTS: CXR ordered to ensure proper placement of ET tube.     SUBJECTIVE: Patient seen and examined at bedside. Limited, patient is intubated.     OBJECTIVE:  ICU Vital Signs Last 24 Hrs  T(F): 98 (29 Aug 2019 04:00), Max: 98.5 (29 Aug 2019 00:00)  HR: 57 (29 Aug 2019 06:29) (49 - 89)  BP: 104/42 (29 Aug 2019 05:00) (83/45 - 129/53)  BP(mean): 61 (29 Aug 2019 05:00) (57 - 87)  ABP: --  ABP(mean): --  RR: 12 (29 Aug 2019 05:00) (11 - 18)  SpO2: 99% (29 Aug 2019 06:29) (97% - 100%)    Mode: AC/ CMV (Assist Control/ Continuous Mandatory Ventilation)  RR (machine): 12  TV (machine): 360  FiO2: 21  PEEP: 5  ITime: 0.78  MAP: 9  PIP: 22    I&O's Summary    28 Aug 2019 07:01  -  29 Aug 2019 07:00  --------------------------------------------------------  IN: 1599 mL / OUT: 825 mL / NET: 774 mL    General: NAD, intubated, eyes open, does not answer questions   HEENT: NCAT, PERRL, clear conjunctiva, mmm  Neck: supple, no JVD  Respiratory: CTAB, decreased breath sound in L lower lobe   Cardiovascular: RRR, S1S2, no m/r/g  Abdomen: soft, nontender, nondistended, normal bowel sounds  Extremities: no edema or cyanosis  Skin: normal color and turgor; no rash  Neurological: nonfocal    MEDICATIONS  (STANDING):  chlorhexidine 0.12% Liquid 15 milliLiter(s) Oral Mucosa every 12 hours  chlorhexidine 4% Liquid 1 Application(s) Topical <User Schedule>  enoxaparin Injectable 40 milliGRAM(s) SubCutaneous daily  lacosamide 100 milliGRAM(s) Oral <User Schedule>  levETIRAcetam  IVPB 1000 milliGRAM(s) IV Intermittent <User Schedule>  LORazepam Infusion 0.05 mG/kG/Hr (3.415 mL/Hr) IV Continuous <Continuous>  OLANZapine 7.5 milliGRAM(s) Oral at bedtime  petrolatum Ophthalmic Ointment 1 Application(s) Both EYES two times a day  sodium chloride 3%  Inhalation 4 milliLiter(s) Inhalation every 6 hours  valproate sodium IVPB 750 milliGRAM(s) IV Intermittent every 8 hours    MEDICATIONS  (PRN):  LORazepam   Injectable 2 milliGRAM(s) IV Push every 6 hours PRN Seizure > 3 minutes or GTC    Allergies    Topamax (Unknown)    Intolerances    LABS:                        9.7    6.92  )-----------( 176      ( 29 Aug 2019 05:49 )             30.0     08-29    143  |  110<H>  |  11  ----------------------------<  89  3.7   |  21<L>  |  0.56    Ca    8.3<L>      29 Aug 2019 05:49  Phos  4.5     08-29  Mg     2.0     08-29    TPro  6.1  /  Alb  2.3<L>  /  TBili  < 0.2<L>  /  DBili  x   /  AST  28  /  ALT  14  /  AlkPhos  56  08-29    Lactate Trend  08-25 @ 22:30 Lactate:1.0 Hola Draper MD, PGY-1  Pager #[phone]  Spectra #[phone]  -------------------------------------------  INTERVAL HPI/OVERNIGHT EVENTS: CXR ordered to ensure proper placement of ET tube.     SUBJECTIVE: Patient seen and examined at bedside. Limited, patient is intubated.     OBJECTIVE:  ICU Vital Signs Last 24 Hrs  T(F): 98 (29 Aug 2019 04:00), Max: 98.5 (29 Aug 2019 00:00)  HR: 57 (29 Aug 2019 06:29) (49 - 89)  BP: 104/42 (29 Aug 2019 05:00) (83/45 - 129/53)  BP(mean): 61 (29 Aug 2019 05:00) (57 - 87)  ABP: --  ABP(mean): --  RR: 12 (29 Aug 2019 05:00) (11 - 18)  SpO2: 99% (29 Aug 2019 06:29) (97% - 100%)    Mode: AC/ CMV (Assist Control/ Continuous Mandatory Ventilation)  RR (machine): 12  TV (machine): 360  FiO2: 21  PEEP: 5  ITime: 0.78  MAP: 9  PIP: 22    I&O's Summary    28 Aug 2019 07:01  -  29 Aug 2019 07:00  --------------------------------------------------------  IN: 1599 mL / OUT: 825 mL / NET: 774 mL    General: NAD, intubated, eyes open, does not answer questions   HEENT: NCAT, PERRL, clear conjunctiva, mmm  Neck: supple, no JVD  Respiratory: CTAB, decreased breath sound in L lower lobe   Cardiovascular: RRR, S1S2, no m/r/g  Abdomen: soft, nontender, nondistended, normal bowel sounds  Extremities: no edema or cyanosis  Skin: normal color and turgor; no rash  Neurological: nonfocal    MEDICATIONS  (STANDING):  chlorhexidine 0.12% Liquid 15 milliLiter(s) Oral Mucosa every 12 hours  chlorhexidine 4% Liquid 1 Application(s) Topical <User Schedule>  enoxaparin Injectable 40 milliGRAM(s) SubCutaneous daily  lacosamide 100 milliGRAM(s) Oral <User Schedule>  levETIRAcetam  IVPB 1000 milliGRAM(s) IV Intermittent <User Schedule>  LORazepam Infusion 0.05 mG/kG/Hr (3.415 mL/Hr) IV Continuous <Continuous>  OLANZapine 7.5 milliGRAM(s) Oral at bedtime  petrolatum Ophthalmic Ointment 1 Application(s) Both EYES two times a day  sodium chloride 3%  Inhalation 4 milliLiter(s) Inhalation every 6 hours  valproate sodium IVPB 750 milliGRAM(s) IV Intermittent every 8 hours    MEDICATIONS  (PRN):  LORazepam   Injectable 2 milliGRAM(s) IV Push every 6 hours PRN Seizure > 3 minutes or GTC    Allergies    Topamax (Unknown)    Intolerances    LABS:                        9.7    6.92  )-----------( 176      ( 29 Aug 2019 05:49 )             30.0     08-29    143  |  110<H>  |  11  ----------------------------<  89  3.7   |  21<L>  |  0.56    Ca    8.3<L>      29 Aug 2019 05:49  Phos  4.5     08-29  Mg     2.0     08-29    TPro  6.1  /  Alb  2.3<L>  /  TBili  < 0.2<L>  /  DBili  x   /  AST  28  /  ALT  14  /  AlkPhos  56  08-29    Lactate Trend  08-25 @ 22:30 Lactate:1.0     8/28 EEG  Abnormal EEG study.  1. A total of 15 seizures captured from 16:00 to 09:00, each lasting 30-60s in duration. There was prolonged seizure freedom from 18:40 to 20:40 post lorazepam administration and from 01:20 to 04:00 post clobazam administration. No gross behavioral change seen on video. EEG onset was diffuse, at times with right or left hemispheric predominance.  2. Moderate nonspecific diffuse or multifocal cerebral dysfunction.   Today's study worsened compared to prior 24hr recording due to increase in seizure burden.

## 2019-08-29 NOTE — EEG REPORT - NS EEG TEXT BOX
[facility]   COMPREHENSIVE EPILEPSY CENTER   REPORT OF CONTINUOUS VIDEO EEG     [facility]: [address], [place] [address], Ph#: [phone]  [facility]: [address], [place] [address], Ph#: [phone]  [facility]: [address] [place] [address]    Patient Name: NAYANA JACQUES  Age and : 54y ([birth year])  MRN #: [id number]  Location: [place] [address]  Referring Physician: Roberth Renner    Study Date: 19 - 19    _____________________________________________________________  HISTORY    Patient is a 54y old  Female who presents with a chief complaint of Septic Shock (26 Aug 2019 07:46)    MEDICATIONS  (STANDING):  lacosamide 100 milliGRAM(s) Oral <User Schedule>  levETIRAcetam  IVPB 1000 milliGRAM(s) IV Intermittent <User Schedule>  LORazepam Infusion 0.05 mG/kG/Hr (3.415 mL/Hr) IV Continuous <Continuous>  valproate sodium IVPB 750 milliGRAM(s) IV Intermittent every 8 hours  _____________________________________________________________  STUDY INTERPRETATION    Findings: The background was continuous, spontaneously variable and reactive. No posterior dominant rhythm seen.    Background Slowing:  Intermittent diffuse quasi-rhythmic 4 Hz theta slowing, max in the midline region.    Focal Slowing:   None were present.    Sleep Background:  Drowsiness was characterized by fragmentation, attenuation, and slowing of the background activity.    Sleep was characterized by the presence of symmetric, rudimentary spindles and K-complexes.    Other Non-Epileptiform Findings:  None were present.    Interictal Epileptiform Activity:   None were present.    Events:  One seizure recorded from 11:50am for about 40 seconds with diffuse onset.  - EEG: Onset with diffuse LVFA, at times with higher amplitude and better organized in the right or left hemisphere. Ictal pattern increased in amplitude and evolved to admixture of alpha and beta activities   - Clinical: no gross change in behavior seen on video    Activation Procedures:   Hyperventilation was not performed.    Photic stimulation was not performed.     Artifacts:  Intermittent myogenic and movement artifacts were noted.    ECG:  The heart rate on single channel ECG was predominantly between 60-70 BPM.    _____________________________________________________________  EEG SUMMARY/CLASSIFICATION    Abnormal EEG in awake, drowsy and asleep states.    One seizure recorded from 11:50am for about 40 seconds with diffuse onset.  - EEG: Onset with diffuse LVFA, at times with higher amplitude and better organized in the right or left hemisphere. Ictal pattern increased in amplitude and evolved to admixture of alpha and beta activities   - Clinical: no gross change in behavior seen on video  - EEG: Onset with diffuse LVFA, at times with higher amplitude and better organized in the right or left hemisphere. Ictal pattern increased in amplitude and evolved to admixture of alpha and beta activities   - Clinical: no gross change in behavior seen on video     - Moderate generalized slowing.    _____________________________________________________________  EEG IMPRESSION/CLINICAL CORRELATE  ***PRELIM REPORT***  Abnormal EEG study.  1. One seizure recorded from 11:50am for about 40 seconds with diffuse onset.  - EEG: Onset with diffuse LVFA, at times with higher amplitude and better organized in the right or left hemisphere. Ictal pattern increased in amplitude and evolved to admixture of alpha and beta activities   - Clinical: no gross change in behavior seen on video  2. Moderate nonspecific diffuse or multifocal cerebral dysfunction.       Today's study improved compared to prior 24hr recording due to decrease in seizure burden.      _____________________________________________________________  Mireille Beckman DO  Epilepsy Fellow, [facility] [place]    Tex Skinner MD  Attending Physician, [facility] [place] [facility]   COMPREHENSIVE EPILEPSY CENTER   REPORT OF CONTINUOUS VIDEO EEG     [facility]: [address], [place] [address], Ph#: [phone]  [facility]: [address] [address], Ph#: [phone]  [facility]: [address] [place] [address]    Patient Name: NAYANA JACQUES  Age and : 54y (67)  MRN #: [id number]  Location: [place] [address]  Referring Physician: Roberth Renner    Study Date: 19 - 19    _____________________________________________________________  HISTORY    Patient is a 54y old  Female who presents with a chief complaint of Septic Shock (26 Aug 2019 07:46)    MEDICATIONS  (STANDING):  lacosamide 100 milliGRAM(s) Oral <User Schedule>  levETIRAcetam  IVPB 1000 milliGRAM(s) IV Intermittent <User Schedule>  LORazepam Infusion 0.05 mG/kG/Hr (3.415 mL/Hr) IV Continuous <Continuous>  valproate sodium IVPB 750 milliGRAM(s) IV Intermittent every 8 hours    ________________________________________________________  STUDY INTERPRETATION    Findings: The background was continuous, spontaneously variable and reactive. No posterior dominant rhythm seen.    Background Slowing:  Diffuse theta and polymorphic delta slowing.    Focal Slowing:   None were present.    Sleep Background:  Drowsiness was characterized by fragmentation, attenuation, and slowing of the background activity.    Sleep was characterized by the presence of symmetric, rudimentary spindles and K-complexes.    Other Non-Epileptiform Findings:  None were present.    Interictal Epileptiform Activity:   None were present.    Events:  One seizure recorded from 11:50am for about 40 seconds with diffuse onset.  - Clinical: no gross change in behavior seen on video  - EEG: Onset with diffuse LVFA, at times with higher amplitude and better organized in the right or left hemisphere. Ictal pattern increased in amplitude and evolved to admixture of alpha and beta activities     Activation Procedures:   Hyperventilation was not performed.    Photic stimulation was not performed.     Artifacts:  Intermittent myogenic and movement artifacts were noted.    ECG:  The heart rate on single channel ECG was predominantly between 60-70 BPM.    _____________________________________________________________  EEG SUMMARY/CLASSIFICATION    Abnormal EEG in awake, drowsy and asleep states.    One seizure recorded from 11:50am for about 40 seconds with diffuse onset.  - Clinical: no gross change in behavior seen on video  - EEG: Onset with diffuse LVFA, at times with higher amplitude and better organized in the right or left hemisphere. Ictal pattern increased in amplitude and evolved to admixture of alpha and beta activities     - Moderate to severe generalized slowing.    _____________________________________________________________  EEG IMPRESSION/CLINICAL CORRELATE    Abnormal EEG study.  1. One diffuse onset subclinical seizure recorded at 11:50am that lasted 40 seconds.  2. Moderate to severe nonspecific diffuse or multifocal cerebral dysfunction.       Today's study improved significantly compared to prior 24hr recording due to resolution of seizure after 11:50.    _____________________________________________________________  Mireille Beckman DO  Epilepsy Fellow, [facility] Epilepsy [place]    Tex Skinner MD  Attending Physician, [facility] [place] [facility]   COMPREHENSIVE EPILEPSY CENTER   REPORT OF CONTINUOUS VIDEO EEG     [facility]: [address], [place] [address], Ph#: [phone]  [facility]: [address] [place] [address], Ph#: [phone]  [facility]: [address] [place] [address]    Patient Name: NAYANA JACQUES  Age and : 54y (67)  MRN #: [id number]  Location: [place] [address]  Referring Physician: Roberth Renner    Study Date: 19 - 19    _____________________________________________________________  HISTORY    Patient is a 54y old  Female who presents with a chief complaint of Septic Shock (26 Aug 2019 07:46)    MEDICATIONS  (STANDING):  lacosamide 100 milliGRAM(s) Oral <User Schedule>  levETIRAcetam  IVPB 1000 milliGRAM(s) IV Intermittent <User Schedule>  LORazepam Infusion 0.05 mG/kG/Hr (3.415 mL/Hr) IV Continuous <Continuous>  valproate sodium IVPB 750 milliGRAM(s) IV Intermittent every 8 hours    ________________________________________________________  STUDY INTERPRETATION    Findings: The background was continuous, spontaneously variable and reactive. No posterior dominant rhythm seen.    Background Slowing:  Diffuse theta and polymorphic delta slowing.    Focal Slowing:   None were present.    Sleep Background:  Drowsiness was characterized by fragmentation, attenuation, and slowing of the background activity.    Sleep was characterized by the presence of symmetric, rudimentary spindles and K-complexes.    Other Non-Epileptiform Findings:  None were present.    Interictal Epileptiform Activity:   None were present.    Events:  One seizure recorded from 11:50am for about 40 seconds with diffuse onset.  - Clinical: no gross change in behavior seen on video  - EEG: Onset with diffuse LVFA, at times with higher amplitude and better organized in the right or left hemisphere. Ictal pattern increased in amplitude and evolved to admixture of alpha and beta activities     There were 3 push-button events at 14:21, 14:28 and 16:36 for nonspecific movements or breathing related movement. No abnormal EEG correlate.    Activation Procedures:   Hyperventilation was not performed.    Photic stimulation was not performed.     Artifacts:  Intermittent myogenic and movement artifacts were noted.    ECG:  The heart rate on single channel ECG was predominantly between 60-70 BPM.    _____________________________________________________________  EEG SUMMARY/CLASSIFICATION    Abnormal EEG in awake, drowsy and asleep states.    One seizure recorded from 11:50am for about 40 seconds with diffuse onset.  - Clinical: no gross change in behavior seen on video  - EEG: Onset with diffuse LVFA, at times with higher amplitude and better organized in the right or left hemisphere. Ictal pattern increased in amplitude and evolved to admixture of alpha and beta activities     - There were 3 push-button events at 14:21, 14:28 and 16:36 for nonspecific movements or breathing related movement. No abnormal EEG correlate.  - Moderate to severe generalized slowing.    _____________________________________________________________  EEG IMPRESSION/CLINICAL CORRELATE    Abnormal EEG study.  1. One diffuse onset subclinical seizure recorded at 11:50am that lasted 40 seconds.  2. Three push-button events for nonspecific movements or breathing related movement without abnormal EEG correlate.  3. Moderate to severe nonspecific diffuse or multifocal cerebral dysfunction.       Today's study improved significantly compared to prior 24hr recording due to resolution of seizure after 11:50.    _____________________________________________________________  Mireille Beckman DO  Epilepsy Fellow, [facility]ilepsy [place]    Tex Skinner MD  Attending Physician, [facility]ilepsy [place]

## 2019-08-29 NOTE — PROGRESS NOTE ADULT - ATTENDING COMMENTS
Patient examined and care reviewed in detail on bedside rounds  Critically ill on vent woth persistent seizures  Frequent bedside visits with therapy change today.   I have personally provided 35+ minutes of critical care time concurrently with the resident/fellow; this excludes time spent on separate procedures.

## 2019-08-30 DIAGNOSIS — Z51.5 ENCOUNTER FOR PALLIATIVE CARE: ICD-10-CM

## 2019-08-30 DIAGNOSIS — R56.9 UNSPECIFIED CONVULSIONS: ICD-10-CM

## 2019-08-30 DIAGNOSIS — A41.52 SEPSIS DUE TO PSEUDOMONAS: ICD-10-CM

## 2019-08-30 DIAGNOSIS — G80.9 CEREBRAL PALSY, UNSPECIFIED: ICD-10-CM

## 2019-08-30 DIAGNOSIS — R53.2 FUNCTIONAL QUADRIPLEGIA: ICD-10-CM

## 2019-08-30 DIAGNOSIS — J96.02 ACUTE RESPIRATORY FAILURE WITH HYPERCAPNIA: ICD-10-CM

## 2019-08-30 LAB
ANION GAP SERPL CALC-SCNC: 13 MMO/L — SIGNIFICANT CHANGE UP (ref 7–14)
BASOPHILS # BLD AUTO: 0.03 K/UL — SIGNIFICANT CHANGE UP (ref 0–0.2)
BASOPHILS NFR BLD AUTO: 0.4 % — SIGNIFICANT CHANGE UP (ref 0–2)
BUN SERPL-MCNC: 9 MG/DL — SIGNIFICANT CHANGE UP (ref 7–23)
CALCIUM SERPL-MCNC: 8.4 MG/DL — SIGNIFICANT CHANGE UP (ref 8.4–10.5)
CHLORIDE SERPL-SCNC: 111 MMOL/L — HIGH (ref 98–107)
CO2 SERPL-SCNC: 22 MMOL/L — SIGNIFICANT CHANGE UP (ref 22–31)
CREAT SERPL-MCNC: 0.53 MG/DL — SIGNIFICANT CHANGE UP (ref 0.5–1.3)
EOSINOPHIL # BLD AUTO: 0.3 K/UL — SIGNIFICANT CHANGE UP (ref 0–0.5)
EOSINOPHIL NFR BLD AUTO: 4.1 % — SIGNIFICANT CHANGE UP (ref 0–6)
GLUCOSE SERPL-MCNC: 115 MG/DL — HIGH (ref 70–99)
HCT VFR BLD CALC: 25.9 % — LOW (ref 34.5–45)
HGB BLD-MCNC: 8.2 G/DL — LOW (ref 11.5–15.5)
IMM GRANULOCYTES NFR BLD AUTO: 2.6 % — HIGH (ref 0–1.5)
LYMPHOCYTES # BLD AUTO: 2.87 K/UL — SIGNIFICANT CHANGE UP (ref 1–3.3)
LYMPHOCYTES # BLD AUTO: 39.6 % — SIGNIFICANT CHANGE UP (ref 13–44)
MAGNESIUM SERPL-MCNC: 2 MG/DL — SIGNIFICANT CHANGE UP (ref 1.6–2.6)
MCHC RBC-ENTMCNC: 31.7 % — LOW (ref 32–36)
MCHC RBC-ENTMCNC: 33.7 PG — SIGNIFICANT CHANGE UP (ref 27–34)
MCV RBC AUTO: 106.6 FL — HIGH (ref 80–100)
MONOCYTES # BLD AUTO: 0.9 K/UL — SIGNIFICANT CHANGE UP (ref 0–0.9)
MONOCYTES NFR BLD AUTO: 12.4 % — SIGNIFICANT CHANGE UP (ref 2–14)
NEUTROPHILS # BLD AUTO: 2.96 K/UL — SIGNIFICANT CHANGE UP (ref 1.8–7.4)
NEUTROPHILS NFR BLD AUTO: 40.9 % — LOW (ref 43–77)
NRBC # FLD: 0.04 K/UL — SIGNIFICANT CHANGE UP (ref 0–0)
PHOSPHATE SERPL-MCNC: 4.5 MG/DL — SIGNIFICANT CHANGE UP (ref 2.5–4.5)
PLATELET # BLD AUTO: 185 K/UL — SIGNIFICANT CHANGE UP (ref 150–400)
PMV BLD: 9.3 FL — SIGNIFICANT CHANGE UP (ref 7–13)
POTASSIUM SERPL-MCNC: 3.5 MMOL/L — SIGNIFICANT CHANGE UP (ref 3.5–5.3)
POTASSIUM SERPL-SCNC: 3.5 MMOL/L — SIGNIFICANT CHANGE UP (ref 3.5–5.3)
RBC # BLD: 2.43 M/UL — LOW (ref 3.8–5.2)
RBC # FLD: 16.4 % — HIGH (ref 10.3–14.5)
SODIUM SERPL-SCNC: 146 MMOL/L — HIGH (ref 135–145)
WBC # BLD: 7.25 K/UL — SIGNIFICANT CHANGE UP (ref 3.8–10.5)
WBC # FLD AUTO: 7.25 K/UL — SIGNIFICANT CHANGE UP (ref 3.8–10.5)

## 2019-08-30 PROCEDURE — 95951: CPT | Mod: 26

## 2019-08-30 PROCEDURE — 99232 SBSQ HOSP IP/OBS MODERATE 35: CPT | Mod: GC

## 2019-08-30 PROCEDURE — 99223 1ST HOSP IP/OBS HIGH 75: CPT

## 2019-08-30 PROCEDURE — 99291 CRITICAL CARE FIRST HOUR: CPT

## 2019-08-30 RX ORDER — NOREPINEPHRINE BITARTRATE/D5W 8 MG/250ML
0.05 PLASTIC BAG, INJECTION (ML) INTRAVENOUS
Qty: 8 | Refills: 0 | Status: DISCONTINUED | OUTPATIENT
Start: 2019-08-30 | End: 2019-09-02

## 2019-08-30 RX ORDER — POTASSIUM CHLORIDE 20 MEQ
10 PACKET (EA) ORAL
Refills: 0 | Status: COMPLETED | OUTPATIENT
Start: 2019-08-30 | End: 2019-08-30

## 2019-08-30 RX ADMIN — SODIUM CHLORIDE 4 MILLILITER(S): 9 INJECTION INTRAMUSCULAR; INTRAVENOUS; SUBCUTANEOUS at 15:46

## 2019-08-30 RX ADMIN — CHLORHEXIDINE GLUCONATE 15 MILLILITER(S): 213 SOLUTION TOPICAL at 21:15

## 2019-08-30 RX ADMIN — Medication 28.75 MILLIGRAM(S): at 01:00

## 2019-08-30 RX ADMIN — Medication 28.75 MILLIGRAM(S): at 23:49

## 2019-08-30 RX ADMIN — LACOSAMIDE 100 MILLIGRAM(S): 50 TABLET ORAL at 05:11

## 2019-08-30 RX ADMIN — LEVETIRACETAM 400 MILLIGRAM(S): 250 TABLET, FILM COATED ORAL at 14:07

## 2019-08-30 RX ADMIN — CLOBAZAM 10 MILLIGRAM(S): 10 TABLET ORAL at 05:12

## 2019-08-30 RX ADMIN — Medication 2 MILLIGRAM(S): at 17:29

## 2019-08-30 RX ADMIN — CHLORHEXIDINE GLUCONATE 1 APPLICATION(S): 213 SOLUTION TOPICAL at 12:55

## 2019-08-30 RX ADMIN — SODIUM CHLORIDE 4 MILLILITER(S): 9 INJECTION INTRAMUSCULAR; INTRAVENOUS; SUBCUTANEOUS at 22:41

## 2019-08-30 RX ADMIN — Medication 100 MILLIEQUIVALENT(S): at 14:37

## 2019-08-30 RX ADMIN — ENOXAPARIN SODIUM 40 MILLIGRAM(S): 100 INJECTION SUBCUTANEOUS at 13:15

## 2019-08-30 RX ADMIN — Medication 1 APPLICATION(S): at 17:29

## 2019-08-30 RX ADMIN — Medication 100 MILLIEQUIVALENT(S): at 12:30

## 2019-08-30 RX ADMIN — LEVETIRACETAM 400 MILLIGRAM(S): 250 TABLET, FILM COATED ORAL at 08:30

## 2019-08-30 RX ADMIN — Medication 100 MILLIEQUIVALENT(S): at 16:25

## 2019-08-30 RX ADMIN — OLANZAPINE 7.5 MILLIGRAM(S): 15 TABLET, FILM COATED ORAL at 23:49

## 2019-08-30 RX ADMIN — LEVETIRACETAM 400 MILLIGRAM(S): 250 TABLET, FILM COATED ORAL at 21:15

## 2019-08-30 RX ADMIN — Medication 28.75 MILLIGRAM(S): at 08:45

## 2019-08-30 RX ADMIN — SODIUM CHLORIDE 4 MILLILITER(S): 9 INJECTION INTRAMUSCULAR; INTRAVENOUS; SUBCUTANEOUS at 03:29

## 2019-08-30 RX ADMIN — LACOSAMIDE 100 MILLIGRAM(S): 50 TABLET ORAL at 15:15

## 2019-08-30 RX ADMIN — CHLORHEXIDINE GLUCONATE 15 MILLILITER(S): 213 SOLUTION TOPICAL at 05:11

## 2019-08-30 RX ADMIN — Medication 28.75 MILLIGRAM(S): at 16:30

## 2019-08-30 RX ADMIN — LACOSAMIDE 100 MILLIGRAM(S): 50 TABLET ORAL at 21:16

## 2019-08-30 RX ADMIN — SODIUM CHLORIDE 4 MILLILITER(S): 9 INJECTION INTRAMUSCULAR; INTRAVENOUS; SUBCUTANEOUS at 09:45

## 2019-08-30 RX ADMIN — CLOBAZAM 10 MILLIGRAM(S): 10 TABLET ORAL at 17:30

## 2019-08-30 RX ADMIN — Medication 1 APPLICATION(S): at 05:11

## 2019-08-30 RX ADMIN — Medication 2 MILLIGRAM(S): at 23:49

## 2019-08-30 NOTE — PROGRESS NOTE ADULT - SUBJECTIVE AND OBJECTIVE BOX
Hola Draper MD, PGY-1  Pager #[phone]  Spectra #[phone]  -------------------------------------------  INTERVAL HPI/OVERNIGHT EVENTS: CXR ordered to ensure proper placement of ET tube.     SUBJECTIVE: Patient seen and examined at bedside. Pt is intubated and obtunded.    OBJECTIVE:  ICU Vital Signs Last 24 Hrs  T(F): 98.7 (30 Aug 2019 12:00), Max: 99.8 (30 Aug 2019 08:00)  HR: 79 (30 Aug 2019 15:46) (40 - 91)  BP: 100/54 (30 Aug 2019 14:00) (85/44 - 130/64)  BP(mean): 69 (30 Aug 2019 14:00) (57 - 83)  ABP: --  ABP(mean): --  RR: 12 (30 Aug 2019 14:00) (12 - 24)  SpO2: 98% (30 Aug 2019 15:46) (91% - 100%)    Mode: IMV (Intermittent Mandatory Ventilation)  RR (machine): 12  TV (machine): 360  FiO2: 21  PEEP: 5  MAP: 9  PIP: 23    I&O's Summary    29 Aug 2019 07:01  -  30 Aug 2019 07:00  --------------------------------------------------------  IN: 2522 mL / OUT: 1650 mL / NET: 872 mL    30 Aug 2019 07:01  -  30 Aug 2019 16:26  --------------------------------------------------------  IN: 806.2 mL / OUT: 0 mL / NET: 806.2 mL    General: NAD, intubated, eyes open, does not answer questions   HEENT: NCAT, PERRL, clear conjunctiva, mmm  Neck: supple, no JVD  Respiratory: CTAB, decreased breath sound in L lower lobe   Cardiovascular: RRR, S1S2, no m/r/g  Abdomen: soft, nontender, nondistended, normal bowel sounds  Extremities: no edema or cyanosis  Skin: normal color and turgor; no rash  Neurological: nonfocal    MEDICATIONS  (STANDING):  chlorhexidine 0.12% Liquid 15 milliLiter(s) Oral Mucosa every 12 hours  chlorhexidine 4% Liquid 1 Application(s) Topical <User Schedule>  Clobazam Suspension 10 milliGRAM(s) Oral two times a day  enoxaparin Injectable 40 milliGRAM(s) SubCutaneous daily  lacosamide Solution 100 milliGRAM(s) Oral <User Schedule>  levETIRAcetam  IVPB 1000 milliGRAM(s) IV Intermittent <User Schedule>  LORazepam Infusion 0.029 mG/kG/Hr (2 mL/Hr) IV Continuous <Continuous>  norepinephrine Infusion 0.05 MICROgram(s)/kG/Min (6.403 mL/Hr) IV Continuous <Continuous>  OLANZapine 7.5 milliGRAM(s) Oral at bedtime  petrolatum Ophthalmic Ointment 1 Application(s) Both EYES two times a day  sodium chloride 3%  Inhalation 4 milliLiter(s) Inhalation every 6 hours  valproate sodium IVPB 750 milliGRAM(s) IV Intermittent every 8 hours    MEDICATIONS  (PRN):  LORazepam   Injectable 2 milliGRAM(s) IV Push every 6 hours PRN Seizure > 3 minutes or GTC    Allergies    Topamax (Unknown)    Intolerances    LABS:                        8.2    7.25  )-----------( 185      ( 30 Aug 2019 03:34 )             25.9     08-30    146<H>  |  111<H>  |  9   ----------------------------<  115<H>  3.5   |  22  |  0.53    Ca    8.4      30 Aug 2019 03:34  Phos  4.5     08-30  Mg     2.0     08-30    TPro  6.1  /  Alb  2.3<L>  /  TBili  < 0.2<L>  /  DBili  x   /  AST  28  /  ALT  14  /  AlkPhos  56  08-29    Lactate Trend  08-25 @ 22:30 Lactate:1.0     08/30  Abnormal EEG study.  1. Moderate to severe nonspecific diffuse or multifocal cerebral dysfunction.   2. No epileptiform pattern or seizures seen.     08/29  Abnormal EEG study.  1. One diffuse onset subclinical seizure recorded at 11:50am that lasted 40 seconds.  2. Three push-button events for nonspecific movements or breathing related movement without abnormal EEG correlate.  3. Moderate to severe nonspecific diffuse or multifocal cerebral dysfunction.   Today's study improved significantly compared to prior 24hr recording due to resolution of seizure after 11:50.    08/28 EEG  Abnormal EEG study.  1. A total of 15 seizures captured from 16:00 to 09:00, each lasting 30-60s in duration. There was prolonged seizure freedom from 18:40 to 20:40 post lorazepam administration and from 01:20 to 04:00 post clobazam administration. No gross behavioral change seen on video. EEG onset was diffuse, at times with right or left hemispheric predominance.  2. Moderate nonspecific diffuse or multifocal cerebral dysfunction.   Today's study worsened compared to prior 24hr recording due to increase in seizure burden.

## 2019-08-30 NOTE — EEG REPORT - NS EEG TEXT BOX
[facility]   COMPREHENSIVE EPILEPSY CENTER   REPORT OF CONTINUOUS VIDEO EEG     [facility]: [address], [place] [address], Ph#: [phone]  [facility]: [address] [place] [address], Ph#: [phone]  [facility]: [address] [place] [address]    Patient Name: NAYANA JACQUES  Age and : 54y ([birth year])  MRN #: [id number]  Location: [place] [address]  Referring Physician: Roberth Renner    Study Date: 19 - 19    _____________________________________________________________  HISTORY    Patient is a 54y old  Female who presents with a chief complaint of Septic Shock (26 Aug 2019 07:46)    MEDICATIONS  (STANDING):  Clobazam Suspension 10 milliGRAM(s) Oral two times a day  lacosamide Solution 100 milliGRAM(s) Oral <User Schedule>  levETIRAcetam  IVPB 1000 milliGRAM(s) IV Intermittent <User Schedule>  LORazepam Infusion 0.05 mG/kG/Hr (3.415 mL/Hr) IV Continuous <Continuous>  valproate sodium IVPB 750 milliGRAM(s) IV Intermittent every 8 hours    ________________________________________________________  STUDY INTERPRETATION    Findings: The background was continuous, spontaneously variable and reactive. No posterior dominant rhythm seen.    Background Slowing:  Diffuse theta and polymorphic delta slowing.    Focal Slowing:   None were present.    Sleep Background:  Drowsiness was characterized by fragmentation, attenuation, and slowing of the background activity.    Sleep was characterized by the presence of symmetric, rudimentary spindles and K-complexes.    Other Non-Epileptiform Findings:  None were present.    Interictal Epileptiform Activity:   None were present.    Events:  None were seen.     Activation Procedures:   Hyperventilation was not performed.    Photic stimulation was not performed.     Artifacts:  Intermittent myogenic and movement artifacts were noted.    ECG:  The heart rate on single channel ECG was predominantly between 60-70 BPM.    _____________________________________________________________  EEG SUMMARY/CLASSIFICATION    Abnormal EEG in awake, drowsy and asleep states.  - Moderate generalized slowing.    _____________________________________________________________  EEG IMPRESSION/CLINICAL CORRELATE  ***PRELIM REPORT***    Abnormal EEG study.  1. Moderate nonspecific diffuse or multifocal cerebral dysfunction.   2. No seizures seen.     _____________________________________________________________  Mireille Beckman DO  Epilepsy Fellow, [facility] [place]    Tex Skinner MD  Attending Physician, [facility] [place] [facility]   COMPREHENSIVE EPILEPSY CENTER   REPORT OF CONTINUOUS VIDEO EEG     [facility]: [address], [place] [address], Ph#: [phone]  [facility]: [address] [place] [address], Ph#: [phone]  [facility]: [address] [place] [address]    Patient Name: NAYANA JACQUES  Age and : 54y (67)  MRN #: [id number]  Location: [place] [address]  Referring Physician: Roberth Renner    Study Date: 19 - 19    _____________________________________________________________  HISTORY    Patient is a 54y old  Female who presents with a chief complaint of Septic Shock (26 Aug 2019 07:46)    MEDICATIONS  (STANDING):  Clobazam Suspension 10 milliGRAM(s) Oral two times a day  lacosamide Solution 100 milliGRAM(s) Oral <User Schedule>  levETIRAcetam  IVPB 1000 milliGRAM(s) IV Intermittent <User Schedule>  LORazepam Infusion 0.029 mG/kG/Hr (2 mL/Hr) IV Continuous <Continuous>  valproate sodium IVPB 750 milliGRAM(s) IV Intermittent every 8 hours    ________________________________________________________  STUDY INTERPRETATION    Findings: The background was continuous, spontaneously variable and reactive. No posterior dominant rhythm seen.    Background Slowing:  Diffuse theta and polymorphic delta slowing.    Focal Slowing:   None were present.    Sleep Background:  Drowsiness was characterized by fragmentation, attenuation, and slowing of the background activity.    Stage II sleep transients were not recorded.    Other Non-Epileptiform Findings:  None were present.    Interictal Epileptiform Activity:   None were present.    Events:  None were seen.     Activation Procedures:   Hyperventilation was not performed.    Photic stimulation was not performed.     Artifacts:  Intermittent myogenic and movement artifacts were noted.    ECG:  The heart rate on single channel ECG was predominantly between 60-70 BPM.    _____________________________________________________________  EEG SUMMARY/CLASSIFICATION      Abnormal EEG in a sedated patient.  - Moderate to severe generalized slowing.    _____________________________________________________________  EEG IMPRESSION/CLINICAL CORRELATE    Abnormal EEG study.  1. Moderate to severe nonspecific diffuse or multifocal cerebral dysfunction.   2. No epileptiform pattern or seizures seen.     _____________________________________________________________  Mireille Beckman DO  Epilepsy Fellow, [facility] [place]    Tex Skinner MD  Attending Physician, [facility] [place]

## 2019-08-30 NOTE — CONSULT NOTE ADULT - PROBLEM SELECTOR RECOMMENDATION 2
Most likely 2/2 pneumonia. Patient required intubation and mechanical ventilation. Patient is currently unable to be weaned. Will most likely require a tracheostomy or consider withdrawal of care. Patient treated with IV antibiotics.     While off sedation, apneic on CPAP trial. While on rpt CPAP, patient had low tidal volume, right shift cardiac frame, suspect atelectasis of LLB, may attempt bronchoscopy to clear mucus plug if present as  per medical team.

## 2019-08-30 NOTE — PROGRESS NOTE ADULT - ATTENDING COMMENTS
54F PMH Cerebral palsy and seizure disorder who presents with septic shock due to aspiration pneumonia with prolonged respiratory failure and recurrent seizures, now improved.    - Continue current antiepileptic regimen: levetiracetam, valproic acid, lacosamide, clobazam, lorazepam. Taper off lorazepam to inject q6h. Close monitoring while on olanzapine  - Refractory shock, taper off norepinephrine for goal MAP>60-65  - Continue lung protective ventilation, unable to extubate given mental status. SBT as tolerates  - Continue tube feeds  - Stable kidney function and lytes  - S/p course of abx for aspiration pneumonia  - DVT ppx with enoxaparin  - Will need to discuss with family regarding trach/peg  CC time spent: 35 min

## 2019-08-30 NOTE — CONSULT NOTE ADULT - PROBLEM SELECTOR RECOMMENDATION 3
Septic shock 2/2 pneumonia resulting in respiratory failure and h/o recent enteritis. Patient intermittently requires pressor support.     -c/w treatment as per medical team

## 2019-08-30 NOTE — CONSULT NOTE ADULT - ASSESSMENT
55 y/o F w/ PMHx of cerebral palsy and seizure disorder presenting from group home for acute AMS and increased work of breathing with recent seizure found to be hypothermic, hypotensive and bradycardic with leukocytosis and imaging showing new right lung opacification concerning for septic shock 2/2 to aspiration PNA requiring pressors and airway support, intermittently requiring pressors, seizures controlled with 5 AEDs. Palliative care consulted for GOC in the setting of sepsis and respiratory failure.

## 2019-08-30 NOTE — CONSULT NOTE ADULT - SUBJECTIVE AND OBJECTIVE BOX
HPI:  53 y/o F w/ PMHx of cerebral palsy and seizure disorder presenting from group home for AMS and increased work of breathing. Patient is minimally verbal at baseline so unable to give history. Obtained history from aide at bedside and chart review. She reports that since yesterday patient has had decreased alertness and "breathing has been different". At baseline patient is interactive and may say a few words. She can also withdraw from her pain and eats pureed foods. Reportedly had a seizure episode yesterday and since then has not been herself. No fevers/chills, cough, nausea/vomiting or diarrhea.    Of note patient has had multiple hospitalizations in the past year recently between April 23rd to April 30th. At that time presented due to multiple seizures concerning for status epilepticus. Patient was hypotensive, hypothermic and bradycardic in the setting of septic shock and admitted to MICU for pressor support. Her infectious work up was positive for enteritis on CT A/P and was given Cipro/Flagyl for 7 days and then weaned off pressors and transitioned to the floors. During that hospitalization her AEDs were optimized with an increase in depakote to 750mg TID (1 pill of 250 and 1 pill of 500) and ativan was discontinued. Has had multiple UTIs in the past 2/2 to E coli (pansensitive).    In the ED 96F, /66 HR 98 RR 18 SpO2 of 98% on RA. Labs notable for leukocytosis 18 (neutrophilic predominant), thrombocytopenia to 112, pH 7.26 pCO2 75 pO2 31 with a lactate of 5.9 (repeat 3.7). Neurology consulted. CXR shows right lung opacity concerning for PNA. Had 5-6 seizures that were GTC that lasted ~30 seconds. Received levaquin x 1, 3L of NS, Solucortef 100mg x 1, Keppra 1500mg x 1, Ativan 1mg and 2mg x 1 each. Patient continued to be hypothermic, bradycardic to 50s and hypotensive to the 60s and started on levophed gtt. Transferred to MICU for further management. (18 Aug 2019 21:45)    PERTINENT PM/SXH:   Intellectual disability  Constipation  Seizure disorder  Cerebral palsy    No significant past surgical history    FAMILY HISTORY:  No pertinent family history in first degree relatives    ITEMS NOT CHECKED ARE NOT PRESENT    SOCIAL HISTORY:   Significant other/partner:  [ ]  Children:  [ ]  Presybeterian/Spirituality:  Substance hx:  [ ]   Tobacco hx:  [ ]   Alcohol hx: [ ]   Home Opioid hx:  [ ] I-Stop Reference No:  Living Situation: [ ]Home  [ ]Long term care  [ ]Rehab [ ]Other    ADVANCE DIRECTIVES:    DNR  MOLST  [ ]  Living Will  [ ]   DECISION MAKER(s):  [ ] Health Care Proxy(s)  [ ] Surrogate(s)  [ ] Guardian           Name(s): Phone Number(s):    BASELINE (I)ADL(s) (prior to admission):  [place]: [ ]Total  [ ] Moderate [ ]Dependent    Allergies    Topamax (Unknown)    Intolerances    MEDICATIONS  (STANDING):  chlorhexidine 0.12% Liquid 15 milliLiter(s) Oral Mucosa every 12 hours  chlorhexidine 4% Liquid 1 Application(s) Topical <User Schedule>  Clobazam Suspension 10 milliGRAM(s) Oral two times a day  enoxaparin Injectable 40 milliGRAM(s) SubCutaneous daily  lacosamide Solution 100 milliGRAM(s) Oral <User Schedule>  levETIRAcetam  IVPB 1000 milliGRAM(s) IV Intermittent <User Schedule>  LORazepam Infusion 0.029 mG/kG/Hr (2 mL/Hr) IV Continuous <Continuous>  norepinephrine Infusion 0.05 MICROgram(s)/kG/Min (6.403 mL/Hr) IV Continuous <Continuous>  OLANZapine 7.5 milliGRAM(s) Oral at bedtime  petrolatum Ophthalmic Ointment 1 Application(s) Both EYES two times a day  potassium chloride  10 mEq/100 mL IVPB 10 milliEquivalent(s) IV Intermittent every 1 hour  sodium chloride 3%  Inhalation 4 milliLiter(s) Inhalation every 6 hours  valproate sodium IVPB 750 milliGRAM(s) IV Intermittent every 8 hours    MEDICATIONS  (PRN):  LORazepam   Injectable 2 milliGRAM(s) IV Push every 6 hours PRN Seizure > 3 minutes or GTC    PRESENT SYMPTOMS: [ ]Unable to obtain due to poor mentation   Source if other than patient:  [ ]Family   [ ]Team     Pain: [ ] yes [ ] no  QOL impact -   Location -                    Aggravating factors -  Quality -  Radiation -  Timing-  Severity (0-10 scale):  Minimal acceptable level (0-10 scale):     PAIN AD Score:     [email or web address] (press ctrl +  left click to view)    Dyspnea:                           [ ]Mild [ ]Moderate [ ]Severe  Anxiety:                             [ ]Mild [ ]Moderate [ ]Severe  Fatigue:                             [ ]Mild [ ]Moderate [ ]Severe  Nausea:                             [ ]Mild [ ]Moderate [ ]Severe  Loss of appetite:              [ ]Mild [ ]Moderate [ ]Severe  Constipation:                    [ ]Mild [ ]Moderate [ ]Severe    Other Symptoms:  [ ]All other review of systems negative     Karnofsky Performance Score/Palliative Performance Status Version 2:         %    http://npcrc.org/files/news/palliative_performance_scale_ppsv2.pdf  PHYSICAL EXAM:  Vital Signs Last 24 Hrs  T(C): 37.1 (30 Aug 2019 12:00), Max: 37.7 (30 Aug 2019 08:00)  T(F): 98.7 (30 Aug 2019 12:00), Max: 99.8 (30 Aug 2019 08:00)  HR: 71 (30 Aug 2019 12:00) (40 - 91)  BP: 95/51 (30 Aug 2019 12:00) (85/44 - 130/64)  BP(mean): 65 (30 Aug 2019 12:00) (54 - 83)  RR: 12 (30 Aug 2019 12:00) (12 - 24)  SpO2: 91% (30 Aug 2019 12:00) (91% - 100%) I&O's Summary    29 Aug 2019 07:01  -  30 Aug 2019 07:00  --------------------------------------------------------  IN: 2522 mL / OUT: 1650 mL / NET: 872 mL    30 Aug 2019 07:01  -  30 Aug 2019 13:16  --------------------------------------------------------  IN: 806.2 mL / OUT: 0 mL / NET: 806.2 mL    GENERAL:  [ ]Alert  [ ]Oriented x   [ ]Lethargic  [ ]Cachexia  [ ]Unarousable  [ ]Verbal  [ ]Non-Verbal  Behavioral:   [ ] Anxiety  [ ] Delirium [ ] Agitation [ ] Other  HEENT:  [ ]Normal   [ ]Dry mouth   [ ]ET Tube/Trach  [ ]Oral lesions  PULMONARY:   [ ]Clear [ ]Tachypnea  [ ]Audible excessive secretions   [ ]Rhonchi        [ ]Right [ ]Left [ ]Bilateral  [ ]Crackles        [ ]Right [ ]Left [ ]Bilateral  [ ]Wheezing     [ ]Right [ ]Left [ ]Bilateral  CARDIOVASCULAR:    [ ]Regular [ ]Irregular [ ]Tachy  [ ]Main [ ]Murmur [ ]Other  GASTROINTESTINAL:  [ ]Soft  [ ]Distended   [ ]+BS  [ ]Non tender [ ]Tender  [ ]PEG [ ]OGT/ NGT  Last BM:   08-23-19 @ 07:01  -  08-24-19 @ 07:00  --------------------------------------------------------  OUT: 150 mL    08-24-19 @ 07:01  -  08-25-19 @ 07:00  --------------------------------------------------------  OUT: 350 mL    08-25-19 @ 07:01  -  08-26-19 @ 07:00  --------------------------------------------------------  OUT: 200 mL    08-26-19 @ 07:01  -  08-27-19 @ 07:00  --------------------------------------------------------  OUT: 100 mL    08-27-19 @ 07:01  -  08-28-19 @ 07:00  --------------------------------------------------------  OUT: 300 mL    08-29-19 @ 07:01  -  08-30-19 @ 07:00  --------------------------------------------------------  OUT: 100 mL    GENITOURINARY:  [ ]Normal [ ] Incontinent   [ ]Oliguria/Anuria   [ ]Cazares  MUSCULOSKELETAL:   [ ]Normal   [ ]Weakness  [ ]Bed/Wheelchair bound [ ]Edema  NEUROLOGIC:   [ ]No focal deficits  [ ] Cognitive impairment  [ ] Dysphagia [ ]Dysarthria [ ] Paresis [ ]Other   SKIN:   [ ]Normal   [ ]Pressure ulcer(s)  [ ]Rash    CRITICAL CARE:  [ ] Shock Present  [ ]Septic [ ]Cardiogenic [ ]Neurologic [ ]Hypovolemic  [ ]  Vasopressors [ ]  Inotropes   [ ] Respiratory failure present [ ] mechanical ventilation [ ] non-invasive ventilatory support [ ] High flow  [ ] Acute  [ ] Chronic [ ] Hypoxic  [ ] Hypercarbic [ ] Other  [ ] Other organ failure     LABS:                        8.2    7.25  )-----------( 185      ( 30 Aug 2019 03:34 )             25.9   08-30    146<H>  |  111<H>  |  9   ----------------------------<  115<H>  3.5   |  22  |  0.53    Ca    8.4      30 Aug 2019 03:34  Phos  4.5     08-30  Mg     2.0     08-30    TPro  6.1  /  Alb  2.3<L>  /  TBili  < 0.2<L>  /  DBili  x   /  AST  28  /  ALT  14  /  AlkPhos  56  08-29        RADIOLOGY & ADDITIONAL STUDIES:    PROTEIN CALORIE MALNUTRITION PRESENT: [ ] Yes [ ] No  [ ] PPSV2 < or = to 30% [ ] significant weight loss  [ ] poor nutritional intake [ ] catabolic state [ ] anasarca     Albumin, Serum: 2.3 g/dL (08-29-19 @ 05:49)  Artificial Nutrition [ ]     REFERRALS:   [ ]Chaplaincy  [ ] Hospice  [ ]Child Life  [ ]Social Work  [ ]Case management [ ]Holistic Therapy     Goals of Care Document: HPI:  55 y/o F w/ PMHx of cerebral palsy and seizure disorder presenting from group home for AMS and increased work of breathing. Patient is minimally verbal at baseline so unable to give history. Obtained history from aide at bedside and chart review. She reports that since yesterday patient has had decreased alertness and "breathing has been different". At baseline patient is interactive and may say a few words. She can also withdraw from her pain and eats pureed foods. Reportedly had a seizure episode yesterday and since then has not been herself. No fevers/chills, cough, nausea/vomiting or diarrhea.    Of note patient has had multiple hospitalizations in the past year recently between April 23rd to April 30th. At that time presented due to multiple seizures concerning for status epilepticus. Patient was hypotensive, hypothermic and bradycardic in the setting of septic shock and admitted to MICU for pressor support. Her infectious work up was positive for enteritis on CT A/P and was given Cipro/Flagyl for 7 days and then weaned off pressors and transitioned to the floors. During that hospitalization her AEDs were optimized with an increase in depakote to 750mg TID (1 pill of 250 and 1 pill of 500) and ativan was discontinued. Has had multiple UTIs in the past 2/2 to E coli (pansensitive).    In the ED 96F, /66 HR 98 RR 18 SpO2 of 98% on RA. Labs notable for leukocytosis 18 (neutrophilic predominant), thrombocytopenia to 112, pH 7.26 pCO2 75 pO2 31 with a lactate of 5.9 (repeat 3.7). Neurology consulted. CXR shows right lung opacity concerning for PNA. Had 5-6 seizures that were GTC that lasted ~30 seconds. Received levaquin x 1, 3L of NS, Solucortef 100mg x 1, Keppra 1500mg x 1, Ativan 1mg and 2mg x 1 each. Patient continued to be hypothermic, bradycardic to 50s and hypotensive to the 60s and started on levophed gtt. Transferred to MICU for further management. (18 Aug 2019 21:45)    PERTINENT PM/SXH:   Intellectual disability  Constipation  Seizure disorder  Cerebral palsy    No significant past surgical history    FAMILY HISTORY:  No pertinent family history in first degree relatives    ITEMS NOT CHECKED ARE NOT PRESENT    SOCIAL HISTORY:   Significant other/partner:  [ ]  Children:  [ ]  Judaism/Spirituality:  Substance hx:  [ ]   Tobacco hx:  [ ]   Alcohol hx: [ ]   Home Opioid hx:  [ ] I-Stop Reference No:  Living Situation: [ ]Home  [x]Long term care/ Adult Home  [ ]Rehab [ ]Other    ADVANCE DIRECTIVES:    DNR  MOLST  [ ]  Living Will  [ ]   DECISION MAKER(s): Most likely a anthony of the state, will contact [facility]  [ ] Health Care Proxy(s)  [ ] Surrogate(s)  [ ] Guardian           Name(s): Phone Number(s):    BASELINE (I)ADL(s) (prior to admission): unknown  [place]: [ ]Total  [ ] Moderate [ ]Dependent    Allergies    Topamax (Unknown)    Intolerances    MEDICATIONS  (STANDING):  chlorhexidine 0.12% Liquid 15 milliLiter(s) Oral Mucosa every 12 hours  chlorhexidine 4% Liquid 1 Application(s) Topical <User Schedule>  Clobazam Suspension 10 milliGRAM(s) Oral two times a day  enoxaparin Injectable 40 milliGRAM(s) SubCutaneous daily  lacosamide Solution 100 milliGRAM(s) Oral <User Schedule>  levETIRAcetam  IVPB 1000 milliGRAM(s) IV Intermittent <User Schedule>  LORazepam Infusion 0.029 mG/kG/Hr (2 mL/Hr) IV Continuous <Continuous>  norepinephrine Infusion 0.05 MICROgram(s)/kG/Min (6.403 mL/Hr) IV Continuous <Continuous>  OLANZapine 7.5 milliGRAM(s) Oral at bedtime  petrolatum Ophthalmic Ointment 1 Application(s) Both EYES two times a day  potassium chloride  10 mEq/100 mL IVPB 10 milliEquivalent(s) IV Intermittent every 1 hour  sodium chloride 3%  Inhalation 4 milliLiter(s) Inhalation every 6 hours  valproate sodium IVPB 750 milliGRAM(s) IV Intermittent every 8 hours    MEDICATIONS  (PRN):  LORazepam   Injectable 2 milliGRAM(s) IV Push every 6 hours PRN Seizure > 3 minutes or GTC    PRESENT SYMPTOMS: [x]Unable to obtain due to poor mentation   Source if other than patient:  [ ]Family   [ ]Team     Pain: [ ] yes [x] no  QOL impact -   Location -                    Aggravating factors -  Quality -  Radiation -  Timing-  Severity (0-10 scale):  Minimal acceptable level (0-10 scale):     PAIN AD Score: 0    [email or web address] (press ctrl +  left click to view)    Dyspnea:                           [ ]Mild [ ]Moderate [ ]Severe  Anxiety:                             [ ]Mild [ ]Moderate [ ]Severe  Fatigue:                             [ ]Mild [ ]Moderate [ ]Severe  Nausea:                             [ ]Mild [ ]Moderate [ ]Severe  Loss of appetite:              [ ]Mild [ ]Moderate [ ]Severe  Constipation:                    [ ]Mild [ ]Moderate [ ]Severe    Other Symptoms: Unable to assess as patient is sedated and requiring mechanical ventilation.  [ ]All other review of systems negative     Karnofsky Performance Score/Palliative Performance Status Version 2: 10%    http://[facility].org/files/news/palliative_performance_scale_[email or web address].pdf  PHYSICAL EXAM:  Vital Signs Last 24 Hrs  T(C): 37.1 (30 Aug 2019 12:00), Max: 37.7 (30 Aug 2019 08:00)  T(F): 98.7 (30 Aug 2019 12:00), Max: 99.8 (30 Aug 2019 08:00)  HR: 71 (30 Aug 2019 12:00) (40 - 91)  BP: 95/51 (30 Aug 2019 12:00) (85/44 - 130/64)  BP(mean): 65 (30 Aug 2019 12:00) (54 - 83)  RR: 12 (30 Aug 2019 12:00) (12 - 24)  SpO2: 91% (30 Aug 2019 12:00) (91% - 100%) I&O's Summary    29 Aug 2019 07:01  -  30 Aug 2019 07:00  --------------------------------------------------------  IN: 2522 mL / OUT: 1650 mL / NET: 872 mL    30 Aug 2019 07:01  -  30 Aug 2019 13:16  --------------------------------------------------------  IN: 806.2 mL / OUT: 0 mL / NET: 806.2 mL    GENERAL: Sedated on life support  [ ]Alert  [ ]Oriented x   [ ]Lethargic  [ ]Cachexia  [x]Unarousable  [ ]Verbal  [x]Non-Verbal  Behavioral:   [ ] Anxiety  [ ] Delirium [ ] Agitation [ ] Other  HEENT:  [ ]Normal   [ ]Dry mouth   [x]ET Tube/Trach  [ ]Oral lesions  PULMONARY:   [ ]Clear [x]Tachypnea  [ ]Audible excessive secretions   [x]Rhonchi        [ ]Right [ ]Left [x]Bilateral  [ ]Crackles        [ ]Right [ ]Left [ ]Bilateral  [ ]Wheezing     [ ]Right [ ]Left [ ]Bilateral  CARDIOVASCULAR:    [ ]Regular [ ]Irregular [x]Tachy  [ ]Main [ ]Murmur [ ]Other  GASTROINTESTINAL:  [x]Soft  [x]Distended   [ ]+BS  [ ]Non tender [ ]Tender  [ ]PEG [ ]OGT/ NGT  Last BM: Today  08-23-19 @ 07:01  -  08-24-19 @ 07:00  --------------------------------------------------------  OUT: 150 mL    08-24-19 @ 07:01  -  08-25-19 @ 07:00  --------------------------------------------------------  OUT: 350 mL    08-25-19 @ 07:01  -  08-26-19 @ 07:00  --------------------------------------------------------  OUT: 200 mL    08-26-19 @ 07:01  -  08-27-19 @ 07:00  --------------------------------------------------------  OUT: 100 mL    08-27-19 @ 07:01  -  08-28-19 @ 07:00  --------------------------------------------------------  OUT: 300 mL    08-29-19 @ 07:01  -  08-30-19 @ 07:00  --------------------------------------------------------  OUT: 100 mL    GENITOURINARY:  [ ]Normal [ ] Incontinent   [ ]Oliguria/Anuria   [x]Cazares  MUSCULOSKELETAL:   [ ]Normal   [ ]Weakness  [x]Bed/Wheelchair bound [ ]Edema  NEUROLOGIC:   [ ]No focal deficits  [x] Cognitive impairment  [ ] Dysphagia [ ]Dysarthria [ ] Paresis [ ]Other   SKIN:   [ ]Normal   [ ]Pressure ulcer(s)  [ ]Rash [x] please refer to nursing notes for further details    CRITICAL CARE:  [ ] Shock Present  [x]Septic [ ]Cardiogenic [ ]Neurologic [ ]Hypovolemic  [x]  Vasopressors [ ]  Inotropes   [x] Respiratory failure present [x] mechanical ventilation [ ] non-invasive ventilatory support [ ] High flow  [x] Acute  [ ] Chronic [ ] Hypoxic  [ ] Hypercarbic [ ] Other  [ ] Other organ failure     LABS:                        8.2    7.25  )-----------( 185      ( 30 Aug 2019 03:34 )             25.9   08-30    146<H>  |  111<H>  |  9   ----------------------------<  115<H>  3.5   |  22  |  0.53    Ca    8.4      30 Aug 2019 03:34  Phos  4.5     08-30  Mg     2.0     08-30    TPro  6.1  /  Alb  2.3<L>  /  TBili  < 0.2<L>  /  DBili  x   /  AST  28  /  ALT  14  /  AlkPhos  56  08-29        RADIOLOGY & ADDITIONAL STUDIES: < from: CT Head No Cont (08.19.19 @ 00:17) >    IMPRESSION:     No acute intracranial hemorrhage, large cortical infarct or mass effect.   Additional findings as described. If clinically indicated, short-term   follow-up or MRI may be obtained for further evaluation.    < end of copied text >      PROTEIN CALORIE MALNUTRITION PRESENT: [x] Yes [ ] No  [x] PPSV2 < or = to 30% [ ] significant weight loss  [x] poor nutritional intake [x] catabolic state [x] anasarca     Albumin, Serum: 2.3 g/dL (08-29-19 @ 05:49)  Artificial Nutrition [ ]     REFERRALS:   [ ]Chaplaincy  [ ] Hospice  [ ]Child Life  [x]Social Work  [x]Case management [ ]Holistic Therapy

## 2019-08-30 NOTE — PROGRESS NOTE ADULT - ASSESSMENT
Ms. Ponce is a 54F with a PMH of cerebral palsy and seizure disorder presenting from group home for acute AMS and increased work of breathing with recent seizure yesterday found to be hypothermic, hypotensive and bradycardic with leukocytosis and imaging showing new right lung opacification concerning for septic shock 2/2 to aspiration PNA requiring pressors and airway support, now off pressors. trying to wean off vent support     NEURO: Patient has a history of cerebral palsy and seizure disorder. Baseline mental status minimally verbal but interactive/alert and withdraws to pain. Neurology on board. Lethargic currently 2/2 to metabolic encephalopathy due to infection.  -Intubated due to poor mental status s/p seizures concern for possible status epilepticus and hypercarbic respiratory failure  -Ativan 2mg PRN for seizures > 3 minutes or GTC when off sedation  -Potential epileptogenic focus in the right fronto-temporal region shown on VEEG  -Spoke with Neuro in regards to pt's AEDs: valproate 750mg TID, levetiracetam 1000mg TID. Load lacosamide 200 mg IV, [id number] for maintenance.    -Lorazepam gtt for seizures with RASS target -2 to -3  -Restarted patient on home olanzapine    CARDIOVASCULAR: No history of cardiac disease. However found to be hypotensive and bradycardic in the setting of sepsis. Pressor support improved SBPs and bradycardia.  -Off pressors    PULMONARY: Hypothermic with lung imaging concerning for new right lung opacification. On POCUS see signs of consolidation likely aspiration PNA   - S/p intubation in MICU for poor mental status and inability to maintain airway, as well as hypercarbic respiratory failure pCO2s 50s to 70s  - When plan to extubate will need steroids to minimize vocal cord edema  - Aspiration precautions   -Sputum Cx no growth   -Off sedation, apneic on CPAP trial  -on rpt CPAP, patient has low tidal volume, right shift cardiac frame, suspect atelectasis of LLB, may attempt bronchoscopy to clear mucus plug if present    GI: Patient has a history of enteritis in the past but no GI symptoms currently. Eats pureed foods.  - OGT feeding initiated   - Aspiration precautions     RENAL:   - Purewick catheter in place with clear urine output  - Replete electrolytes PRN    INFECTIOUS DISEASE: New right lung opacification with hypothermia and leukocytosis meeting SIRS criteria with presumable source aspiration PNA complicated by hypotension 2/2 to septic shock. UA negative.   - If patient remains on high doses of pressor support will consider CT chest/abdomen/pelvis to rule out occult source of infection given also has a recent history of enteritis requiring MICU admission  -Follow up blood cultures  -s/p zosyn for aspiration pneumonia with last day 8/23  -Legionella negative  -CBCs with diff daily  -Sputum cultures showed no growth for 24h    HEMATOLOGY: has thrombocytopenia but has had thrombocytopenia in the past could be 2/2 to infection  -Worsening thrombocytopenia, d/c'd heparin for now, possible cause Depakote went down on patient's Depakote dose, will trend platelets     ENDOCRINE:  - No active issues    SKIN:  - Few blanching pressure wounds on back of feet and sacral area     DVT PPx:  - enoxaparin 40 mg subQ qd    Code Status: Full Ms. Ponce is a 54F with a PMH of cerebral palsy and seizure disorder presenting from group home for acute AMS and increased work of breathing with recent seizure found to be hypothermic, hypotensive and bradycardic with leukocytosis and imaging showing new right lung opacification concerning for septic shock 2/2 to aspiration PNA requiring pressors and airway support, intermittently requiring pressors, seizures controlled with 5 AEDs.     NEURO: Patient has a history of cerebral palsy and seizure disorder. Baseline mental status minimally verbal but interactive/alert and withdraws to pain. Neurology on board. Lethargic currently 2/2 to metabolic encephalopathy due to infection.  -Intubated due to poor mental status s/p seizures concern for possible status epilepticus and hypercarbic respiratory failure  -Potential epileptogenic focus in the right fronto-temporal region shown on VEEG  -Spoke with Neuro in regards to pt's AEDs: valproate 750mg TID, levetiracetam 1000mg TID. Load lacosamide 200 mg IV, [id number] for maintenance.    -Will transition lorazepam gtt for seizures to lorazepam q6  -Restarted patient on home olanzapine - can lower seizure threshold, will consider d/cing.     CARDIOVASCULAR: No history of cardiac disease. However found to be hypotensive and bradycardic in the setting of sepsis. Pressor support improved SBPs and bradycardia.  -Norepi gtt titrating for goal MAP > 60     PULMONARY: Hypothermic with lung imaging concerning for new right lung opacification. On POCUS see signs of consolidation likely aspiration PNA   - S/p intubation in MICU for poor mental status and inability to maintain airway, as well as hypercarbic respiratory failure pCO2s 50s to 70s  -When plan to extubate will need steroids to minimize vocal cord edema  -Aspiration precautions   -Sputum Cx no growth   -Off sedation, apneic on CPAP trial  -on rpt CPAP, patient has low tidal volume, right shift cardiac frame, suspect atelectasis of LLB, may attempt bronchoscopy to clear mucus plug if present    GI: Patient has a history of enteritis in the past but no GI symptoms currently. Eats pureed foods.  - Nutrition recs appreciated, OGT feeding initiated  - Aspiration precautions     RENAL:   - Purewick catheter in place with clear urine output  - Replete electrolytes PRN    INFECTIOUS DISEASE: New right lung opacification with hypothermia and leukocytosis meeting SIRS criteria with presumable source aspiration PNA complicated by hypotension 2/2 to septic shock. UA negative.   - If patient remains on high doses of pressor support will consider CT chest/abdomen/pelvis to rule out occult source of infection given also has a recent history of enteritis requiring MICU admission  -Follow up blood cultures  -s/p zosyn for aspiration pneumonia with last day 8/23  -Legionella negative  -CBCs with diff daily  -Sputum cultures showed no growth for 24h    HEMATOLOGY: has thrombocytopenia but has had thrombocytopenia in the past could be 2/2 to infection  -Worsening thrombocytopenia, d/c'd heparin for now, possible cause Depakote went down on patient's Depakote dose, will trend platelets   - Platelets stable  - Macrocytic anemia, f/u B12, folate    ENDOCRINE:  - No active issues    SKIN:  - Few blanching pressure wounds on back of feet and sacral area     DVT PPx:  - enoxaparin 40 mg subQ qd    Code Status: Full

## 2019-08-30 NOTE — CONSULT NOTE ADULT - PROBLEM SELECTOR RECOMMENDATION 6
Patient with intellectual disability, cerebral palsy, and difficult to control seizure d/o w/ respiratory failure and septic shock, currently unable to be weaned from ventilator. Will need to contact [employer] to confirm if patient will need MOLST checklist to be filled out before establishing GOC. Will attempt to contact patient's mother for further information.

## 2019-08-30 NOTE — CONSULT NOTE ADULT - PROBLEM SELECTOR RECOMMENDATION 4
Patient has a history of cerebral palsy and seizure disorder. Baseline mental status minimally verbal but interactive/alert and withdraws to pain. Lethargic currently 2/2 to metabolic encephalopathy due to infection. Patient required intubation due to poor mental status s/p seizures concern for possible status epilepticus and hypercarbic respiratory failure.    -VEEG revealed potential epileptogenic focus in the right fronto-temporal region   -AED include valproate 750mg TID, levetiracetam 1000mg TID. Load lacosamide 200 mg IV, [id number] for maintenance as per neurology  -Transitioning from lorazepam gtt for to lorazepam q6hr

## 2019-08-31 LAB
ALBUMIN SERPL ELPH-MCNC: 3.2 G/DL — LOW (ref 3.3–5)
ALP SERPL-CCNC: 71 U/L — SIGNIFICANT CHANGE UP (ref 40–120)
ALT FLD-CCNC: 13 U/L — SIGNIFICANT CHANGE UP (ref 4–33)
ANION GAP SERPL CALC-SCNC: 13 MMO/L — SIGNIFICANT CHANGE UP (ref 7–14)
AST SERPL-CCNC: 28 U/L — SIGNIFICANT CHANGE UP (ref 4–32)
BILIRUB SERPL-MCNC: 0.3 MG/DL — SIGNIFICANT CHANGE UP (ref 0.2–1.2)
BUN SERPL-MCNC: 12 MG/DL — SIGNIFICANT CHANGE UP (ref 7–23)
CALCIUM SERPL-MCNC: 9.1 MG/DL — SIGNIFICANT CHANGE UP (ref 8.4–10.5)
CHLORIDE SERPL-SCNC: 107 MMOL/L — SIGNIFICANT CHANGE UP (ref 98–107)
CO2 SERPL-SCNC: 24 MMOL/L — SIGNIFICANT CHANGE UP (ref 22–31)
CREAT SERPL-MCNC: 0.52 MG/DL — SIGNIFICANT CHANGE UP (ref 0.5–1.3)
FOLATE SERPL-MCNC: > 20 NG/ML — HIGH (ref 4.7–20)
GLUCOSE SERPL-MCNC: 101 MG/DL — HIGH (ref 70–99)
HCT VFR BLD CALC: 39.6 % — SIGNIFICANT CHANGE UP (ref 34.5–45)
HGB BLD-MCNC: 12.2 G/DL — SIGNIFICANT CHANGE UP (ref 11.5–15.5)
LEVETIRACETAM SERPL-MCNC: 62.3 MCG/ML — HIGH (ref 12–46)
MAGNESIUM SERPL-MCNC: 2 MG/DL — SIGNIFICANT CHANGE UP (ref 1.6–2.6)
MCHC RBC-ENTMCNC: 30.8 % — LOW (ref 32–36)
MCHC RBC-ENTMCNC: 33 PG — SIGNIFICANT CHANGE UP (ref 27–34)
MCV RBC AUTO: 107 FL — HIGH (ref 80–100)
NRBC # FLD: 0.11 K/UL — SIGNIFICANT CHANGE UP (ref 0–0)
PHOSPHATE SERPL-MCNC: 4.8 MG/DL — HIGH (ref 2.5–4.5)
PLATELET # BLD AUTO: 276 K/UL — SIGNIFICANT CHANGE UP (ref 150–400)
PMV BLD: 9.2 FL — SIGNIFICANT CHANGE UP (ref 7–13)
POTASSIUM SERPL-MCNC: 4.3 MMOL/L — SIGNIFICANT CHANGE UP (ref 3.5–5.3)
POTASSIUM SERPL-SCNC: 4.3 MMOL/L — SIGNIFICANT CHANGE UP (ref 3.5–5.3)
PROT SERPL-MCNC: 7.3 G/DL — SIGNIFICANT CHANGE UP (ref 6–8.3)
RBC # BLD: 3.7 M/UL — LOW (ref 3.8–5.2)
RBC # FLD: 16.9 % — HIGH (ref 10.3–14.5)
SODIUM SERPL-SCNC: 144 MMOL/L — SIGNIFICANT CHANGE UP (ref 135–145)
VIT B12 SERPL-MCNC: 1540 PG/ML — HIGH (ref 200–900)
WBC # BLD: 10.77 K/UL — HIGH (ref 3.8–10.5)
WBC # FLD AUTO: 10.77 K/UL — HIGH (ref 3.8–10.5)

## 2019-08-31 PROCEDURE — 99291 CRITICAL CARE FIRST HOUR: CPT

## 2019-08-31 PROCEDURE — 99223 1ST HOSP IP/OBS HIGH 75: CPT

## 2019-08-31 PROCEDURE — 95951: CPT | Mod: 26

## 2019-08-31 PROCEDURE — 71045 X-RAY EXAM CHEST 1 VIEW: CPT | Mod: 26

## 2019-08-31 RX ORDER — MEROPENEM 1 G/30ML
INJECTION INTRAVENOUS
Refills: 0 | Status: DISCONTINUED | OUTPATIENT
Start: 2019-08-31 | End: 2019-09-03

## 2019-08-31 RX ORDER — ACETAMINOPHEN 500 MG
600 TABLET ORAL ONCE
Refills: 0 | Status: DISCONTINUED | OUTPATIENT
Start: 2019-08-31 | End: 2019-08-31

## 2019-08-31 RX ORDER — SODIUM CHLORIDE 9 MG/ML
500 INJECTION INTRAMUSCULAR; INTRAVENOUS; SUBCUTANEOUS ONCE
Refills: 0 | Status: COMPLETED | OUTPATIENT
Start: 2019-08-31 | End: 2019-08-31

## 2019-08-31 RX ORDER — VANCOMYCIN HCL 1 G
1000 VIAL (EA) INTRAVENOUS EVERY 12 HOURS
Refills: 0 | Status: DISCONTINUED | OUTPATIENT
Start: 2019-08-31 | End: 2019-09-05

## 2019-08-31 RX ORDER — MEROPENEM 1 G/30ML
1000 INJECTION INTRAVENOUS ONCE
Refills: 0 | Status: COMPLETED | OUTPATIENT
Start: 2019-08-31 | End: 2019-08-31

## 2019-08-31 RX ORDER — SODIUM CHLORIDE 9 MG/ML
1000 INJECTION INTRAMUSCULAR; INTRAVENOUS; SUBCUTANEOUS ONCE
Refills: 0 | Status: DISCONTINUED | OUTPATIENT
Start: 2019-08-31 | End: 2019-08-31

## 2019-08-31 RX ORDER — VANCOMYCIN HCL 1 G
1000 VIAL (EA) INTRAVENOUS ONCE
Refills: 0 | Status: COMPLETED | OUTPATIENT
Start: 2019-08-31 | End: 2019-08-31

## 2019-08-31 RX ORDER — VANCOMYCIN HCL 1 G
VIAL (EA) INTRAVENOUS
Refills: 0 | Status: DISCONTINUED | OUTPATIENT
Start: 2019-08-31 | End: 2019-09-05

## 2019-08-31 RX ORDER — VANCOMYCIN HCL 1 G
1000 VIAL (EA) INTRAVENOUS EVERY 12 HOURS
Refills: 0 | Status: DISCONTINUED | OUTPATIENT
Start: 2019-08-31 | End: 2019-08-31

## 2019-08-31 RX ORDER — MEROPENEM 1 G/30ML
1000 INJECTION INTRAVENOUS EVERY 8 HOURS
Refills: 0 | Status: DISCONTINUED | OUTPATIENT
Start: 2019-08-31 | End: 2019-09-03

## 2019-08-31 RX ADMIN — CHLORHEXIDINE GLUCONATE 1 APPLICATION(S): 213 SOLUTION TOPICAL at 06:32

## 2019-08-31 RX ADMIN — OLANZAPINE 7.5 MILLIGRAM(S): 15 TABLET, FILM COATED ORAL at 23:26

## 2019-08-31 RX ADMIN — Medication 6.4 MICROGRAM(S)/KG/MIN: at 07:38

## 2019-08-31 RX ADMIN — CHLORHEXIDINE GLUCONATE 15 MILLILITER(S): 213 SOLUTION TOPICAL at 18:27

## 2019-08-31 RX ADMIN — MEROPENEM 100 MILLIGRAM(S): 1 INJECTION INTRAVENOUS at 11:55

## 2019-08-31 RX ADMIN — Medication 250 MILLIGRAM(S): at 18:27

## 2019-08-31 RX ADMIN — SODIUM CHLORIDE 4 MILLILITER(S): 9 INJECTION INTRAMUSCULAR; INTRAVENOUS; SUBCUTANEOUS at 22:02

## 2019-08-31 RX ADMIN — Medication 2 MILLIGRAM(S): at 12:07

## 2019-08-31 RX ADMIN — CHLORHEXIDINE GLUCONATE 15 MILLILITER(S): 213 SOLUTION TOPICAL at 06:32

## 2019-08-31 RX ADMIN — LACOSAMIDE 100 MILLIGRAM(S): 50 TABLET ORAL at 06:32

## 2019-08-31 RX ADMIN — Medication 1 APPLICATION(S): at 06:33

## 2019-08-31 RX ADMIN — MEROPENEM 100 MILLIGRAM(S): 1 INJECTION INTRAVENOUS at 19:51

## 2019-08-31 RX ADMIN — SODIUM CHLORIDE 4 MILLILITER(S): 9 INJECTION INTRAMUSCULAR; INTRAVENOUS; SUBCUTANEOUS at 17:04

## 2019-08-31 RX ADMIN — Medication 250 MILLIGRAM(S): at 12:41

## 2019-08-31 RX ADMIN — Medication 2 MILLIGRAM(S): at 06:32

## 2019-08-31 RX ADMIN — LEVETIRACETAM 400 MILLIGRAM(S): 250 TABLET, FILM COATED ORAL at 19:51

## 2019-08-31 RX ADMIN — LEVETIRACETAM 400 MILLIGRAM(S): 250 TABLET, FILM COATED ORAL at 07:40

## 2019-08-31 RX ADMIN — SODIUM CHLORIDE 1000 MILLILITER(S): 9 INJECTION INTRAMUSCULAR; INTRAVENOUS; SUBCUTANEOUS at 08:30

## 2019-08-31 RX ADMIN — ENOXAPARIN SODIUM 40 MILLIGRAM(S): 100 INJECTION SUBCUTANEOUS at 12:07

## 2019-08-31 RX ADMIN — SODIUM CHLORIDE 4 MILLILITER(S): 9 INJECTION INTRAMUSCULAR; INTRAVENOUS; SUBCUTANEOUS at 03:56

## 2019-08-31 RX ADMIN — LACOSAMIDE 100 MILLIGRAM(S): 50 TABLET ORAL at 14:16

## 2019-08-31 RX ADMIN — CLOBAZAM 10 MILLIGRAM(S): 10 TABLET ORAL at 18:45

## 2019-08-31 RX ADMIN — Medication 1 APPLICATION(S): at 18:27

## 2019-08-31 RX ADMIN — Medication 2 MILLIGRAM(S): at 18:27

## 2019-08-31 RX ADMIN — SODIUM CHLORIDE 4 MILLILITER(S): 9 INJECTION INTRAMUSCULAR; INTRAVENOUS; SUBCUTANEOUS at 12:27

## 2019-08-31 RX ADMIN — CLOBAZAM 10 MILLIGRAM(S): 10 TABLET ORAL at 06:33

## 2019-08-31 RX ADMIN — Medication 28.75 MILLIGRAM(S): at 19:52

## 2019-08-31 RX ADMIN — Medication 28.75 MILLIGRAM(S): at 07:40

## 2019-08-31 RX ADMIN — LACOSAMIDE 100 MILLIGRAM(S): 50 TABLET ORAL at 23:26

## 2019-08-31 RX ADMIN — SODIUM CHLORIDE 1000 MILLILITER(S): 9 INJECTION INTRAMUSCULAR; INTRAVENOUS; SUBCUTANEOUS at 09:30

## 2019-08-31 RX ADMIN — LEVETIRACETAM 400 MILLIGRAM(S): 250 TABLET, FILM COATED ORAL at 14:16

## 2019-08-31 NOTE — EEG REPORT - NS EEG TEXT BOX
Study Date: Start 08-30-19 08:26 Duration 26:05    _____________________________________________________________  HISTORY    Patient is a 54y old  Female who presents with a chief complaint of Septic Shock (26 Aug 2019 07:46)  ________________________________________________________  STUDY INTERPRETATION    Findings: The record is unchanged from that of the prior 24h. The background was continuous, spontaneously variable and reactive. No posterior dominant rhythm seen.    Background Slowing:  Diffuse theta and polymorphic delta slowing.    Focal Slowing:   None were present.    Sleep Background:  Drowsiness was characterized by fragmentation, attenuation, and slowing of the background activity.    Stage II sleep transients were not recorded.    Other Non-Epileptiform Findings:  None were present.    Interictal Epileptiform Activity:   None were present.    Events:  None were seen.     Activation Procedures:   Hyperventilation was not performed.    Photic stimulation was not performed.     Artifacts:  Intermittent myogenic and movement artifacts were noted.    ECG:  The heart rate on single channel ECG was predominantly between 60-70 BPM.    _____________________________________________________________  EEG SUMMARY/CLASSIFICATION      Abnormal EEG in a sedated patient.  - Moderate to severe generalized slowing.    _____________________________________________________________  EEG IMPRESSION/CLINICAL CORRELATE    Abnormal EEG study.  1. Moderate to severe nonspecific diffuse or multifocal cerebral dysfunction.   2. No epileptiform pattern or seizures seen.     _____________________________________________________________  Gonsalo Rojo MD  Attending Physician, [facility]

## 2019-08-31 NOTE — CONSULT NOTE ADULT - SUBJECTIVE AND OBJECTIVE BOX
HPI:  Ms. Ponce is a 54F with a PMH of cerebral palsy and seizure disorder presenting from group home for AMS and increased work of breathing. Patient is minimally verbal at baseline so unable to give history. Obtained history from aide at bedside and chart review. She reports that since yesterday patient has had decreased alertness and "breathing has been different". At baseline patient is interactive and may say a few words. She can also withdraw from her pain and eats pureed foods. Reportedly had a seizure episode yesterday and since then has not been herself. No fevers/chills, cough, nausea/vomiting or diarrhea.    Of note patient has had multiple hospitalizations in the past year recently between April 23rd to April 30th. At that time presented due to multiple seizures concerning for status epilepticus. Patient was hypotensive, hypothermic and bradycardic in the setting of septic shock and admitted to MICU for pressor support. Her infectious work up was positive for enteritis on CT A/P and was given Cipro/Flagyl for 7 days and then weaned off pressors and transitioned to the floors. During that hospitalization her AEDs were optimized with an increase in depakote to 750mg TID (1 pill of 250 and 1 pill of 500) and ativan was discontinued. Has had multiple UTIs in the past 2/2 to E coli (pansensitive).    In the ED 96F, /66 HR 98 RR 18 SpO2 of 98% on RA. Labs notable for leukocytosis 18 (neutrophilic predominant), thrombocytopenia to 112, pH 7.26 pCO2 75 pO2 31 with a lactate of 5.9 (repeat 3.7). Neurology consulted. CXR shows right lung opacity concerning for PNA. Had 5-6 seizures that were GTC that lasted ~30 seconds. Received levaquin x 1, 3L of NS, Solucortef 100mg x 1, Keppra 1500mg x 1, Ativan 1mg and 2mg x 1 each. Patient continued to be hypothermic, bradycardic to 50s and hypotensive to the 60s and started on levophed gtt. Transferred to MICU for further management. (18 Aug 2019 21:45)      PAST MEDICAL & SURGICAL HISTORY:  Intellectual disability  Constipation  Seizure disorder  Cerebral palsy  No significant past surgical history      Allergies    Topamax (Unknown)    Intolerances        ANTIMICROBIALS:  meropenem  IVPB 1000 every 8 hours  meropenem  IVPB    vancomycin  IVPB    vancomycin  IVPB 1000 every 12 hours      OTHER MEDS:  acetaminophen  IVPB .. 600 milliGRAM(s) IV Intermittent once  chlorhexidine 0.12% Liquid 15 milliLiter(s) Oral Mucosa every 12 hours  chlorhexidine 4% Liquid 1 Application(s) Topical <User Schedule>  Clobazam Suspension 10 milliGRAM(s) Oral two times a day  enoxaparin Injectable 40 milliGRAM(s) SubCutaneous daily  lacosamide Solution 100 milliGRAM(s) Oral <User Schedule>  levETIRAcetam  IVPB 1000 milliGRAM(s) IV Intermittent <User Schedule>  LORazepam   Injectable 2 milliGRAM(s) IV Push every 6 hours  norepinephrine Infusion 0.05 MICROgram(s)/kG/Min IV Continuous <Continuous>  OLANZapine 7.5 milliGRAM(s) Oral at bedtime  petrolatum Ophthalmic Ointment 1 Application(s) Both EYES two times a day  sodium chloride 3%  Inhalation 4 milliLiter(s) Inhalation every 6 hours  valproate sodium IVPB 750 milliGRAM(s) IV Intermittent every 8 hours      SOCIAL HISTORY:    FAMILY HISTORY:  No pertinent family history in first degree relatives      REVIEW OF SYSTEMS  [  ] ROS unobtainable because:    [  ] All other systems negative except as noted below:	    Constitutional:  [ ] fever [ ] chills  [ ] weight loss  [ ] weakness  Skin:  [ ] rash [ ] phlebitis	  Eyes: [ ] icterus [ ] pain  [ ] discharge	  ENMT: [ ] sore throat  [ ] thrush [ ] ulcers [ ] exudates  Respiratory: [ ] dyspnea [ ] hemoptysis [ ] cough [ ] sputum	  Cardiovascular:  [ ] chest pain [ ] palpitations [ ] edema	  Gastrointestinal:  [ ] nausea [ ] vomiting [ ] diarrhea [ ] constipation [ ] pain	  Genitourinary:  [ ] dysuria [ ] frequency [ ] hematuria [ ] discharge [ ] flank pain  [ ] incontinence  Musculoskeletal:  [ ] myalgias [ ] arthralgias [ ] arthritis  [ ] back pain  Neurological:  [ ] headache [ ] seizures  [ ] confusion/altered mental status  Psychiatric:  [ ] anxiety [ ] depression	  Hematology/Lymphatics:  [ ] lymphadenopathy  Endocrine:  [ ] adrenal [ ] thyroid  Allergic/Immunologic:	 [ ] transplant [ ] seasonal    PHYSICAL EXAM:  General: [ ] non-toxic  HEAD/EYES: [ ] PERRL [ ] white sclera [ ] icterus  ENT:  [ ] normal [ ] supple [ ] thrush [ ] pharyngeal exudate  Cardiovascular:   [ ] murmur [ ] normal [ ] PPM/AICD  Respiratory:  [ ] clear to ausculation bilaterally  GI:  [ ] soft, non-tender, normal bowel sounds  :  [ ] mancilla [ ] no CVA tenderness   Musculoskeletal:  [ ] no synovitis  Neurologic:  [ ] non-focal exam   Skin:  [ ] no rash  Lymph: [ ] no lymphadenopathy  Psychiatric:  [ ] appropriate affect [ ] alert & oriented  Lines:  [ ] no phlebitis [ ] central line          Drug Dosing Weight  Height (cm): 152.4 (19 Aug 2019 00:27)  Weight (kg): 68.3 (19 Aug 2019 00:27)  BMI (kg/m2): 29.4 (19 Aug 2019 00:27)  BSA (m2): 1.65 (19 Aug 2019 00:27)    Vital Signs Last 24 Hrs  T(F): 98 (08-31-19 @ 12:00), Max: 100.6 (08-31-19 @ 04:00)    Vital Signs Last 24 Hrs  HR: 62 (08-31-19 @ 15:00) (54 - 100)  BP: 111/69 (08-31-19 @ 15:00) (71/29 - 151/68)  RR: 12 (08-31-19 @ 15:00)  SpO2: 98% (08-31-19 @ 15:00) (91% - 99%)  Wt(kg): --                          12.2   10.77 )-----------( 276      ( 31 Aug 2019 04:00 )             39.6       08-31    144  |  107  |  12  ----------------------------<  101<H>  4.3   |  24  |  0.52    Ca    9.1      31 Aug 2019 04:00  Phos  4.8     08-31  Mg     2.0     08-31    TPro  7.3  /  Alb  3.2<L>  /  TBili  0.3  /  DBili  x   /  AST  28  /  ALT  13  /  AlkPhos  71  08-31          MICROBIOLOGY:    RADIOLOGY: HPI:  Ms. Ponce is a 54F with a PMH of   cerebral palsy  seizure disorder   presenting from group home for AMS and increased work of breathing on 8/18.    Patient is minimally verbal at baseline so unable to give history.   Concern for status epilepticus earlier this year.  Prior tx for "enteritis"    In Ed, had high lactate. Cxr with concern for pna  Had seiures.     S/p 5 days zosyn through 8/24   Had been intubated for airway protection.    Now with hypothermia/ fever  Hypotension.  On prsesors          PAST MEDICAL & SURGICAL HISTORY:  Intellectual disability  Constipation  Seizure disorder  Cerebral palsy  No significant past surgical history      Allergies    Topamax (Unknown)    Intolerances        ANTIMICROBIALS:  meropenem  IVPB 1000 every 8 hours  meropenem  IVPB    vancomycin  IVPB    vancomycin  IVPB 1000 every 12 hours      OTHER MEDS:  acetaminophen  IVPB .. 600 milliGRAM(s) IV Intermittent once  chlorhexidine 0.12% Liquid 15 milliLiter(s) Oral Mucosa every 12 hours  chlorhexidine 4% Liquid 1 Application(s) Topical <User Schedule>  Clobazam Suspension 10 milliGRAM(s) Oral two times a day  enoxaparin Injectable 40 milliGRAM(s) SubCutaneous daily  lacosamide Solution 100 milliGRAM(s) Oral <User Schedule>  levETIRAcetam  IVPB 1000 milliGRAM(s) IV Intermittent <User Schedule>  LORazepam   Injectable 2 milliGRAM(s) IV Push every 6 hours  norepinephrine Infusion 0.05 MICROgram(s)/kG/Min IV Continuous <Continuous>  OLANZapine 7.5 milliGRAM(s) Oral at bedtime  petrolatum Ophthalmic Ointment 1 Application(s) Both EYES two times a day  sodium chloride 3%  Inhalation 4 milliLiter(s) Inhalation every 6 hours  valproate sodium IVPB 750 milliGRAM(s) IV Intermittent every 8 hours      SOCIAL HISTORY:  Pt intubated and unable to provide history  No family available  FAMILY HISTORY:  No pertinent family history in first degree relatives  Pt intubated and unable to provide history  No family available    REVIEW OF SYSTEMS  [ x ] ROS unobtainable because: Pt intubated and unable to provide history No family available  [  ] All other systems negative except as noted below:	    Constitutional:  [ ] fever [ ] chills  [ ] weight loss  [ ] weakness  Skin:  [ ] rash [ ] phlebitis	  Eyes: [ ] icterus [ ] pain  [ ] discharge	  ENMT: [ ] sore throat  [ ] thrush [ ] ulcers [ ] exudates  Respiratory: [ ] dyspnea [ ] hemoptysis [ ] cough [ ] sputum	  Cardiovascular:  [ ] chest pain [ ] palpitations [ ] edema	  Gastrointestinal:  [ ] nausea [ ] vomiting [ ] diarrhea [ ] constipation [ ] pain	  Genitourinary:  [ ] dysuria [ ] frequency [ ] hematuria [ ] discharge [ ] flank pain  [ ] incontinence  Musculoskeletal:  [ ] myalgias [ ] arthralgias [ ] arthritis  [ ] back pain  Neurological:  [ ] headache [ ] seizures  [ ] confusion/altered mental status  Psychiatric:  [ ] anxiety [ ] depression	  Hematology/Lymphatics:  [ ] lymphadenopathy  Endocrine:  [ ] adrenal [ ] thyroid  Allergic/Immunologic:	 [ ] transplant [ ] seasonal    PHYSICAL EXAM:  General: [x ] intubated  HEAD/EYES: [ ] PERRL [x ] white sclera [ ] icterus  ENT:  [ ] normal [ x] supple [ ] thrush [ ] pharyngeal exudate  Cardiovascular:   [ ] murmur [x ] normal [ ] PPM/AICD  Respiratory:  [x ] clear to ausculation bilaterally  GI:  [x ] soft, non-tender, normal bowel sounds  :  [ ] mancilla [x ] no CVA tenderness   Musculoskeletal:  [x ] no synovitis  Neurologic:  [ ] non-focal exam   Skin:  [x ] no rash  Lymph: [x ] no lymphadenopathy  Psychiatric:  [ ] appropriate affect [ ] alert & oriented  Lines:  [ x no phlebitis [ ] central line          Drug Dosing Weight  Height (cm): 152.4 (19 Aug 2019 00:27)  Weight (kg): 68.3 (19 Aug 2019 00:27)  BMI (kg/m2): 29.4 (19 Aug 2019 00:27)  BSA (m2): 1.65 (19 Aug 2019 00:27)    Vital Signs Last 24 Hrs  T(F): 98 (08-31-19 @ 12:00), Max: 100.6 (08-31-19 @ 04:00)    Vital Signs Last 24 Hrs  HR: 62 (08-31-19 @ 15:00) (54 - 100)  BP: 111/69 (08-31-19 @ 15:00) (71/29 - 151/68)  RR: 12 (08-31-19 @ 15:00)  SpO2: 98% (08-31-19 @ 15:00) (91% - 99%)  Wt(kg): --                          12.2   10.77 )-----------( 276      ( 31 Aug 2019 04:00 )             39.6       08-31    144  |  107  |  12  ----------------------------<  101<H>  4.3   |  24  |  0.52    Ca    9.1      31 Aug 2019 04:00  Phos  4.8     08-31  Mg     2.0     08-31    TPro  7.3  /  Alb  3.2<L>  /  TBili  0.3  /  DBili  x   /  AST  28  /  ALT  13  /  AlkPhos  71  08-31          MICROBIOLOGY:      RADIOLOGY:  < from: Xray Chest 1 View- PORTABLE-Urgent (08.29.19 @ 19:03) >  IMPRESSION: Enteric tube has its tip in the stomach. There is an ET tube   with the tip above the markus. The study is limited due to patient's body   habitus and rotation. There is suggestion of a small left pleural   effusion. There is no pneumothorax. There are degenerative changes of the   spine.    < end of copied text >

## 2019-08-31 NOTE — PROGRESS NOTE ADULT - SUBJECTIVE AND OBJECTIVE BOX
Neurology Progress    NAYANA JACQUES54yFemale    HPI:  Ms. Jacques is a 54F with a PMH of cerebral palsy and seizure disorder presenting from group home for AMS and increased work of breathing. Patient is minimally verbal at baseline so unable to give history. Obtained history from aide at bedside and chart review. She reports that since yesterday patient has had decreased alertness and "breathing has been different". At baseline patient is interactive and may say a few words. She can also withdraw from her pain and eats pureed foods. Reportedly had a seizure episode yesterday and since then has not been herself. No fevers/chills, cough, nausea/vomiting or diarrhea.    Of note patient has had multiple hospitalizations in the past year recently between April 23rd to April 30th. At that time presented due to multiple seizures concerning for status epilepticus. Patient was hypotensive, hypothermic and bradycardic in the setting of septic shock and admitted to MICU for pressor support. Her infectious work up was positive for enteritis on CT A/P and was given Cipro/Flagyl for 7 days and then weaned off pressors and transitioned to the floors. During that hospitalization her AEDs were optimized with an increase in depakote to 750mg TID (1 pill of 250 and 1 pill of 500) and ativan was discontinued. Has had multiple UTIs in the past 2/2 to E coli (pansensitive).    In the ED 96F, /66 HR 98 RR 18 SpO2 of 98% on RA. Labs notable for leukocytosis 18 (neutrophilic predominant), thrombocytopenia to 112, pH 7.26 pCO2 75 pO2 31 with a lactate of 5.9 (repeat 3.7). Neurology consulted. CXR shows right lung opacity concerning for PNA. Had 5-6 seizures that were GTC that lasted ~30 seconds. Received levaquin x 1, 3L of NS, Solucortef 100mg x 1, Keppra 1500mg x 1, Ativan 1mg and 2mg x 1 each. Patient continued to be hypothermic, bradycardic to 50s and hypotensive to the 60s and started on levophed gtt. Transferred to MICU for further management. (18 Aug 2019 21:45)      Past Medical History  Intellectual disability  Constipation  Seizure disorder  Cerebral palsy      Past Surgical History  No significant past surgical history      MEDICATIONS    chlorhexidine 0.12% Liquid 15 milliLiter(s) Oral Mucosa every 12 hours  chlorhexidine 4% Liquid 1 Application(s) Topical <User Schedule>  Clobazam Suspension 10 milliGRAM(s) Oral two times a day  enoxaparin Injectable 40 milliGRAM(s) SubCutaneous daily  lacosamide Solution 100 milliGRAM(s) Oral <User Schedule>  levETIRAcetam  IVPB 1000 milliGRAM(s) IV Intermittent <User Schedule>  LORazepam   Injectable 2 milliGRAM(s) IV Push every 6 hours  norepinephrine Infusion 0.05 MICROgram(s)/kG/Min IV Continuous <Continuous>  OLANZapine 7.5 milliGRAM(s) Oral at bedtime  petrolatum Ophthalmic Ointment 1 Application(s) Both EYES two times a day  sodium chloride 0.9% Bolus 500 milliLiter(s) IV Bolus once  sodium chloride 3%  Inhalation 4 milliLiter(s) Inhalation every 6 hours  valproate sodium IVPB 750 milliGRAM(s) IV Intermittent every 8 hours         Family history: No history of dementia, strokes, or seizures   FAMILY HISTORY:  No pertinent family history in first degree relatives    SOCIAL HISTORY -- No history of tobacco or alcohol use     Allergies    Topamax (Unknown)    Intolerances            Vital Signs Last 24 Hrs  T(C): 38.1 (31 Aug 2019 04:00), Max: 38.1 (31 Aug 2019 04:00)  T(F): 100.6 (31 Aug 2019 04:00), Max: 100.6 (31 Aug 2019 04:00)  HR: 100 (31 Aug 2019 07:57) (59 - 100)  BP: 105/59 (31 Aug 2019 07:00) (71/29 - 151/68)  BP(mean): 74 (31 Aug 2019 07:00) (42 - 92)  RR: 12 (31 Aug 2019 07:00) (12 - 24)  SpO2: 92% (31 Aug 2019 07:57) (91% - 98%)        On Neurological Examination:    Mental Status - Patient is lethargic intubated and sedated  Follow no simple commands .  Speech - non verbal                              Cranial Nerves -  Facial symmetry Tongue midline,    Motor Exam - spastic quad.      Sensory    no withdrawal    GENERAL Exam:     Nontoxic , No Acute Distress   	  HEENT:  normocephalic, atraumatic  		  LUNGS:	Clear bilaterally  No Wheeze      VASCULAR: no carotid brui  	  HEART:	 Normal S1S2   No murmur RRR        	  MUSCULOSKELETAL: Normal Range of Motion  	   SKIN:      Normal   No Ecchymosis               LABS:  CBC Full  -  ( 31 Aug 2019 04:00 )  WBC Count : 10.77 K/uL  RBC Count : 3.70 M/uL  Hemoglobin : 12.2 g/dL  Hematocrit : 39.6 %  Platelet Count - Automated : 276 K/uL  Mean Cell Volume : 107.0 fL  Mean Cell Hemoglobin : 33.0 pg  Mean Cell Hemoglobin Concentration : 30.8 %  Auto Neutrophil # : x  Auto Lymphocyte # : x  Auto Monocyte # : x  Auto Eosinophil # : x  Auto Basophil # : x  Auto Neutrophil % : x  Auto Lymphocyte % : x  Auto Monocyte % : x  Auto Eosinophil % : x  Auto Basophil % : x      08-31    144  |  107  |  12  ----------------------------<  101<H>  4.3   |  24  |  0.52    Ca    9.1      31 Aug 2019 04:00  Phos  4.8     08-31  Mg     2.0     08-31    TPro  7.3  /  Alb  3.2<L>  /  TBili  0.3  /  DBili  x   /  AST  28  /  ALT  13  /  AlkPhos  71  08-31    Hemoglobin A1C:     LIVER FUNCTIONS - ( 31 Aug 2019 04:00 )  Alb: 3.2 g/dL / Pro: 7.3 g/dL / ALK PHOS: 71 u/L / ALT: 13 u/L / AST: 28 u/L / GGT: x           Vitamin B12 Vitamin B12, Serum: 1540 pg/mL (08-31 @ 04:00)          RADIOLOGY    EEG   or multifocal cerebral dysfunction.   2. No epileptiform pattern or seizures seen.     _____________________________________________________________  Mireille Beckman DO  Epilepsy Fellow, [facility]    Tex Skinner MD  Attending Physician, [facility] [place]          Electronic Signatures:  Tex Skinner)  (Signed 30-Aug-2019 13:48)  	Authored: EEG REPORT  	Co-Signer: TECH INFORMATION, EEG REPORT  Mireille Beckman)  (Signed 30-Aug-2019 09:16)  	Authored: TECH INFORMATION, EEG REPORT              [email or web address]

## 2019-08-31 NOTE — PROGRESS NOTE ADULT - ASSESSMENT
Ms. Ponce is a 54F with a PMH of cerebral palsy and seizure disorder presenting from group home for acute AMS and increased work of breathing with recent seizure found to be hypothermic, hypotensive and bradycardic with leukocytosis and imaging showing new right lung opacification concerning for septic shock 2/2 to aspiration PNA requiring pressors and airway support, intermittently requiring pressors, seizures controlled with 5 AEDs.     NEURO: Patient has a history of cerebral palsy and seizure disorder. Baseline mental status minimally verbal but interactive/alert and withdraws to pain. Neurology on board. Lethargic currently 2/2 to metabolic encephalopathy due to infection.  -Intubated due to poor mental status s/p seizures concern for possible status epilepticus and hypercarbic respiratory failure  -Potential epileptogenic focus in the right fronto-temporal region shown on VEEG  -Spoke with Neuro in regards to pt's AEDs: valproate 750mg TID, levetiracetam 1000mg TID. Load lacosamide 200 mg IV, [id number] for maintenance.    -Will transition lorazepam gtt for seizures to lorazepam q6  -Restarted patient on home olanzapine - can lower seizure threshold, will consider d/cing.     CARDIOVASCULAR: No history of cardiac disease. However found to be hypotensive and bradycardic in the setting of sepsis. Pressor support improved SBPs and bradycardia.  -Norepi gtt titrating for goal MAP > 60     PULMONARY: Hypothermic with lung imaging concerning for new right lung opacification. On POCUS see signs of consolidation likely aspiration PNA   - S/p intubation in MICU for poor mental status and inability to maintain airway, as well as hypercarbic respiratory failure pCO2s 50s to 70s  -When plan to extubate will need steroids to minimize vocal cord edema  -Aspiration precautions   -Sputum Cx no growth   -Off sedation, apneic on CPAP trial  -on rpt CPAP, patient has low tidal volume, right shift cardiac frame, suspect atelectasis of LLB, may attempt bronchoscopy to clear mucus plug if present    GI: Patient has a history of enteritis in the past but no GI symptoms currently. Eats pureed foods.  - Nutrition recs appreciated, OGT feeding initiated  - Aspiration precautions     RENAL:   - Purewick catheter in place with clear urine output  - Replete electrolytes PRN    INFECTIOUS DISEASE: New right lung opacification with hypothermia and leukocytosis meeting SIRS criteria with presumable source aspiration PNA complicated by hypotension 2/2 to septic shock. UA negative.   - If patient remains on high doses of pressor support will consider CT chest/abdomen/pelvis to rule out occult source of infection given also has a recent history of enteritis requiring MICU admission  -Follow up blood cultures  -s/p zosyn for aspiration pneumonia with last day 8/23  -Legionella negative  -CBCs with diff daily  -Sputum cultures showed no growth for 24h    HEMATOLOGY: has thrombocytopenia but has had thrombocytopenia in the past could be 2/2 to infection  -Worsening thrombocytopenia, d/c'd heparin for now, possible cause Depakote went down on patient's Depakote dose, will trend platelets   - Platelets stable  - Macrocytic anemia, f/u B12, folate    ENDOCRINE:  - No active issues    SKIN:  - Few blanching pressure wounds on back of feet and sacral area     DVT PPx:  - enoxaparin 40 mg subQ qd    Code Status: Full Ms. Ponce is a 54F with a PMH of cerebral palsy and seizure disorder presenting from group home for acute AMS and increased work of breathing with recent seizure found to be hypothermic, hypotensive and bradycardic with leukocytosis and imaging showing new right lung opacification concerning for septic shock 2/2 to aspiration PNA requiring pressors and airway support, intermittently requiring pressors, seizures controlled with 5 AEDs.     NEURO: Patient has a history of cerebral palsy and seizure disorder. Baseline mental status minimally verbal but interactive/alert and withdraws to pain. Neurology on board. Lethargic currently 2/2 to metabolic encephalopathy due to infection.  -Intubated due to poor mental status s/p seizures concern for possible status epilepticus and hypercarbic respiratory failure  -Potential epileptogenic focus in the right fronto-temporal region shown on VEEG  -Spoke with Neuro in regards to pt's AEDs: valproate 750mg TID, levetiracetam 1000mg TID. Load lacosamide 200 mg IV, [id number] for maintenance., lorazepam 2mg q6.   -Restarted patient on home olanzapine - can lower seizure threshold, will consider d/cing.     CARDIOVASCULAR: No history of cardiac disease. However found to be hypotensive and bradycardic in the setting of sepsis. Pressor support improved SBPs and bradycardia.  -Norepi gtt titrating for goal MAP > 60     PULMONARY: Hypothermic with lung imaging concerning for new right lung opacification. On POCUS see signs of consolidation likely aspiration PNA   - S/p intubation in MICU for poor mental status and inability to maintain airway, as well as hypercarbic respiratory failure pCO2s 50s to 70s  -When plan to extubate will need steroids to minimize vocal cord edema  -Aspiration precautions  -Sputum Cx no growth  -on rpt CPAP, patient has low tidal volume, right shift cardiac frame, suspect atelectasis of LLB, may attempt bronchoscopy to clear mucus plug if present    GI: Patient has a history of enteritis in the past but no GI symptoms currently. Eats pureed foods.  - Nutrition recs appreciated, OGT feeding initiated  - Aspiration precautions     RENAL:   - Purewick catheter in place with clear urine output  - Replete electrolytes PRN    INFECTIOUS DISEASE:   - On admission, right lung opacification with hypothermia and leukocytosis meeting SIRS criteria with presumable source aspiration PNA complicated by hypotension 2/2 to septic shock. UA negative.   - If patient remains on high doses of pressor support will consider CT chest/abdomen/pelvis to rule out occult source of infection given also has a recent history of enteritis requiring MICU admission  -Follow up blood cultures  -s/p zosyn for aspiration pneumonia with last day 8/23  -Legionella negative  -CBCs with diff daily  -Sputum cultures showed no growth for 24h  -Pt now with increasing pressure requirement, febrile, may be in setting of new infection with sepsis    HEMATOLOGY: has thrombocytopenia but has had thrombocytopenia in the past could be 2/2 to infection  -Worsening thrombocytopenia, d/c'd heparin for now, possible cause Depakote went down on patient's Depakote dose, will trend platelets   - Platelets stable  - Macrocytic anemia, f/u B12, folate - b12, folate     ENDOCRINE:  - No active issues    SKIN:  - Few blanching pressure wounds on back of feet and sacral area     DVT PPx:  - enoxaparin 40 mg subQ qd    Code Status: Full Ms. Ponce is a 54F with a PMH of cerebral palsy and seizure disorder presenting from group home for acute AMS and increased work of breathing with recent seizure found to be hypothermic, hypotensive and bradycardic with leukocytosis and imaging showing new right lung opacification concerning for septic shock 2/2 to aspiration PNA requiring pressors and airway support, intermittently requiring pressors, seizures controlled with 5 AEDs.     NEURO: Patient has a history of cerebral palsy and seizure disorder. Baseline mental status minimally verbal but interactive/alert and withdraws to pain. Neurology on board. Lethargic currently 2/2 to metabolic encephalopathy due to infection.  -Intubated due to poor mental status s/p seizures concern for possible status epilepticus and hypercarbic respiratory failure  -Potential epileptogenic focus in the right fronto-temporal region shown on VEEG  -Spoke with Neuro in regards to pt's AEDs: valproate 750mg TID, levetiracetam 1000mg TID. Load lacosamide 200 mg IV, 264Q5prn for maintenance., lorazepam 2mg q6.   -Neuro recs appreciated. Will f/u with levels in AM, ammonia. Titrate lorazepam on EEG to monitor seizures  -Restarted patient on home olanzapine - can lower seizure threshold, will consider d/cing.     CARDIOVASCULAR: No history of cardiac disease. However found to be hypotensive and bradycardic in the setting of sepsis. Pressor support improved SBPs and bradycardia.  -Norepi gtt titrating for goal MAP > 60     PULMONARY: Hypothermic with lung imaging concerning for new right lung opacification. On POCUS see signs of consolidation likely aspiration PNA   - S/p intubation in MICU for poor mental status and inability to maintain airway, as well as hypercarbic respiratory failure pCO2s 50s to 70s  -When plan to extubate will need steroids to minimize vocal cord edema  -Aspiration precautions  -Sputum Cx no growth  -on rpt CPAP, patient has low tidal volume, right shift cardiac frame, suspect atelectasis of LLB, may attempt bronchoscopy to clear mucus plug if present    GI: Patient has a history of enteritis in the past but no GI symptoms currently. Eats pureed foods.  - Nutrition recs appreciated, OGT feeding initiated  - Aspiration precautions     RENAL:   - Purewick catheter in place with clear urine output  - Replete electrolytes PRN    INFECTIOUS DISEASE:   - On admission, right lung opacification with hypothermia and leukocytosis meeting SIRS criteria with presumable source aspiration PNA complicated by hypotension 2/2 to septic shock. UA negative.   - If patient remains on high doses of pressor support will consider CT chest/abdomen/pelvis to rule out occult source of infection given also has a recent history of enteritis requiring MICU admission  -Follow up blood cultures  -s/p zosyn for aspiration pneumonia with last day 8/23  -Legionella negative  -CBCs with diff daily  -Sputum cultures showed no growth for 24h  -Pt now with increasing pressure requirement, febrile, may be in setting of new infection with sepsis  -F/u bcx, sputum cx, sputum acid fast  -Empirically started on meropenem and vanc (8/31)    HEMATOLOGY: has thrombocytopenia but has had thrombocytopenia in the past could be 2/2 to infection  -Worsening thrombocytopenia, d/c'd heparin for now, possible cause Depakote went down on patient's Depakote dose, will trend platelets   - Platelets stable  - Macrocytic anemia, B12, folate - elevated    ENDOCRINE:  - No active issues    SKIN:  - Few blanching pressure wounds on back of feet and sacral area     DVT PPx:  - enoxaparin 40 mg subQ qd    Code Status: Full

## 2019-08-31 NOTE — PROGRESS NOTE ADULT - ATTENDING COMMENTS
54F with a PMH of cerebral palsy and seizure disorder. remains to have poor mental status. hypothermia and hypotensive overnight. broadened antibiotic coverage. f/u EEG results. remains on ventilatory support.

## 2019-08-31 NOTE — CHART NOTE - NSCHARTNOTEFT_GEN_A_CORE
was notified by team that patient's seizures are improving and wanted to ask if possible to taper ativan.    current medications:  MEDICATIONS  (STANDING):  acetaminophen  IVPB .. 600 milliGRAM(s) IV Intermittent once  chlorhexidine 0.12% Liquid 15 milliLiter(s) Oral Mucosa every 12 hours  chlorhexidine 4% Liquid 1 Application(s) Topical <User Schedule>  Clobazam Suspension 10 milliGRAM(s) Oral two times a day  enoxaparin Injectable 40 milliGRAM(s) SubCutaneous daily  lacosamide Solution 100 milliGRAM(s) Oral <User Schedule>  levETIRAcetam  IVPB 1000 milliGRAM(s) IV Intermittent <User Schedule>  LORazepam   Injectable 2 milliGRAM(s) IV Push every 6 hours  meropenem  IVPB 1000 milliGRAM(s) IV Intermittent every 8 hours  meropenem  IVPB      norepinephrine Infusion 0.05 MICROgram(s)/kG/Min (6.403 mL/Hr) IV Continuous <Continuous>  OLANZapine 7.5 milliGRAM(s) Oral at bedtime  petrolatum Ophthalmic Ointment 1 Application(s) Both EYES two times a day  sodium chloride 3%  Inhalation 4 milliLiter(s) Inhalation every 6 hours  valproate sodium IVPB 750 milliGRAM(s) IV Intermittent every 8 hours  vancomycin  IVPB      vancomycin  IVPB 1000 milliGRAM(s) IV Intermittent every 12 hours    MEDICATIONS  (PRN):    Recommendations:  [x] spoke with EEG tech today, advised to continue EEG monitoring  [x] advised primary team if patient's seizures are improving, they may begin tapering down the ativan  [x] advised team for patient to get AED levels of the following tomorrow AM with AM labs: clobazam, lacosamide, levetiracetam, valproate  [ ] monitor CBC, CMP (liver toxicity, thrombocytopenia, SIADH)  [ ] monitor telemetry for concern for heart block given known side effect of lacosamide    d/w primary team

## 2019-08-31 NOTE — PROGRESS NOTE ADULT - SUBJECTIVE AND OBJECTIVE BOX
Hola Draper MD, PGY-1  Pager #[phone]  Spectra #[phone]  -------------------------------------------  INTERVAL HPI/OVERNIGHT EVENTS: Overnight, increased pressure requirement to T96F, later febrile to 100.6F.     SUBJECTIVE: Patient seen and examined at bedside. Pt is intubated and obtunded.    OBJECTIVE:  ICU Vital Signs Last 24 Hrs  T(F): 98.9 (31 Aug 2019 08:00), Max: 100.6 (31 Aug 2019 04:00)  HR: 54 (31 Aug 2019 13:00) (54 - 100)  BP: 101/55 (31 Aug 2019 13:00) (71/29 - 151/68)  BP(mean): 69 (31 Aug 2019 13:00) (42 - 92)  ABP: --  ABP(mean): --  RR: 12 (31 Aug 2019 13:00) (12 - 21)  SpO2: 97% (31 Aug 2019 13:00) (91% - 98%)    Mode: AC/ CMV (Assist Control/ Continuous Mandatory Ventilation)  RR (machine): 12  TV (machine): 360  FiO2: 30  PEEP: 5  MAP: 9  PIP: 24    Physical Exam:   General: NAD, intubated, eyes open, does not answer questions   HEENT: NCAT, PERRL, clear conjunctiva, mmm  Neck: supple, no JVD  Respiratory: CTAB, decreased breath sound in L lower lobe   Cardiovascular: RRR, S1S2, no m/r/g  Abdomen: soft, nontender, nondistended, normal bowel sounds  Extremities: no edema or cyanosis  Skin: normal color and turgor; no rash  Neurological: nonfocal    I&O's Summary    30 Aug 2019 07:01  -  31 Aug 2019 07:00  --------------------------------------------------------  IN: 3064.6 mL / OUT: 1500 mL / NET: 1564.6 mL    31 Aug 2019 07:01  -  31 Aug 2019 13:20  --------------------------------------------------------  IN: 2011 mL / OUT: 0 mL / NET: 2011 mL      MEDICATIONS  (STANDING):  chlorhexidine 0.12% Liquid 15 milliLiter(s) Oral Mucosa every 12 hours  chlorhexidine 4% Liquid 1 Application(s) Topical <User Schedule>  Clobazam Suspension 10 milliGRAM(s) Oral two times a day  enoxaparin Injectable 40 milliGRAM(s) SubCutaneous daily  lacosamide Solution 100 milliGRAM(s) Oral <User Schedule>  levETIRAcetam  IVPB 1000 milliGRAM(s) IV Intermittent <User Schedule>  LORazepam   Injectable 2 milliGRAM(s) IV Push every 6 hours  meropenem  IVPB 1000 milliGRAM(s) IV Intermittent every 8 hours  meropenem  IVPB      norepinephrine Infusion 0.05 MICROgram(s)/kG/Min (6.403 mL/Hr) IV Continuous <Continuous>  OLANZapine 7.5 milliGRAM(s) Oral at bedtime  petrolatum Ophthalmic Ointment 1 Application(s) Both EYES two times a day  sodium chloride 3%  Inhalation 4 milliLiter(s) Inhalation every 6 hours  valproate sodium IVPB 750 milliGRAM(s) IV Intermittent every 8 hours  vancomycin  IVPB      vancomycin  IVPB 1000 milliGRAM(s) IV Intermittent every 12 hours    MEDICATIONS  (PRN):    Allergies    Topamax (Unknown)    Intolerances    LABS:             12.2   10.77 )-----------( 276      ( 31 Aug 2019 04:00 )             39.6     08-31    144  |  107  |  12  ----------------------------<  101<H>  4.3   |  24  |  0.52    Ca    9.1      31 Aug 2019 04:00  Phos  4.8     08-31  Mg     2.0     08-31    TPro  7.3  /  Alb  3.2<L>  /  TBili  0.3  /  DBili  x   /  AST  28  /  ALT  13  /  AlkPhos  71  08-31    Lactate Trend    CAPILLARY BLOOD GLUCOSE    08/30  Abnormal EEG study.  1. Moderate to severe nonspecific diffuse or multifocal cerebral dysfunction.   2. No epileptiform pattern or seizures seen.     08/29  Abnormal EEG study.  1. One diffuse onset subclinical seizure recorded at 11:50am that lasted 40 seconds.  2. Three push-button events for nonspecific movements or breathing related movement without abnormal EEG correlate.  3. Moderate to severe nonspecific diffuse or multifocal cerebral dysfunction.   Today's study improved significantly compared to prior 24hr recording due to resolution of seizure after 11:50.    08/28 EEG  Abnormal EEG study.  1. A total of 15 seizures captured from 16:00 to 09:00, each lasting 30-60s in duration. There was prolonged seizure freedom from 18:40 to 20:40 post lorazepam administration and from 01:20 to 04:00 post clobazam administration. No gross behavioral change seen on video. EEG onset was diffuse, at times with right or left hemispheric predominance.  2. Moderate nonspecific diffuse or multifocal cerebral dysfunction.   Today's study worsened compared to prior 24hr recording due to increase in seizure burden. Hola Draper MD, PGY-1  Pager #[phone]  Spectra #[phone]  -------------------------------------------  INTERVAL HPI/OVERNIGHT EVENTS: Overnight, increased pressure requirement to T96F, later febrile to 100.6F.     SUBJECTIVE: Patient seen and examined at bedside. Pt is intubated and obtunded.    OBJECTIVE:  ICU Vital Signs Last 24 Hrs  T(F): 98.9 (31 Aug 2019 08:00), Max: 100.6 (31 Aug 2019 04:00)  HR: 54 (31 Aug 2019 13:00) (54 - 100)  BP: 101/55 (31 Aug 2019 13:00) (71/29 - 151/68)  BP(mean): 69 (31 Aug 2019 13:00) (42 - 92)  ABP: --  ABP(mean): --  RR: 12 (31 Aug 2019 13:00) (12 - 21)  SpO2: 97% (31 Aug 2019 13:00) (91% - 98%)    Mode: AC/ CMV (Assist Control/ Continuous Mandatory Ventilation)  RR (machine): 12  TV (machine): 360  FiO2: 30  PEEP: 5  MAP: 9  PIP: 24    Physical Exam:   General: NAD, intubated, eyes open, does not answer questions   HEENT: NCAT, PERRL, clear conjunctiva, mmm  Neck: supple, no JVD  Respiratory: CTAB, decreased breath sound in L lower lobe   Cardiovascular: RRR, S1S2, no m/r/g  Abdomen: soft, nontender, nondistended, normal bowel sounds  Extremities: no edema or cyanosis  Skin: normal color and turgor; no rash  Neurological: nonfocal    I&O's Summary    30 Aug 2019 07:01  -  31 Aug 2019 07:00  --------------------------------------------------------  IN: 3064.6 mL / OUT: 1500 mL / NET: 1564.6 mL    31 Aug 2019 07:01  -  31 Aug 2019 13:20  --------------------------------------------------------  IN: 2011 mL / OUT: 0 mL / NET: 2011 mL      MEDICATIONS  (STANDING):  chlorhexidine 0.12% Liquid 15 milliLiter(s) Oral Mucosa every 12 hours  chlorhexidine 4% Liquid 1 Application(s) Topical <User Schedule>  Clobazam Suspension 10 milliGRAM(s) Oral two times a day  enoxaparin Injectable 40 milliGRAM(s) SubCutaneous daily  lacosamide Solution 100 milliGRAM(s) Oral <User Schedule>  levETIRAcetam  IVPB 1000 milliGRAM(s) IV Intermittent <User Schedule>  LORazepam   Injectable 2 milliGRAM(s) IV Push every 6 hours  meropenem  IVPB 1000 milliGRAM(s) IV Intermittent every 8 hours  meropenem  IVPB      norepinephrine Infusion 0.05 MICROgram(s)/kG/Min (6.403 mL/Hr) IV Continuous <Continuous>  OLANZapine 7.5 milliGRAM(s) Oral at bedtime  petrolatum Ophthalmic Ointment 1 Application(s) Both EYES two times a day  sodium chloride 3%  Inhalation 4 milliLiter(s) Inhalation every 6 hours  valproate sodium IVPB 750 milliGRAM(s) IV Intermittent every 8 hours  vancomycin  IVPB      vancomycin  IVPB 1000 milliGRAM(s) IV Intermittent every 12 hours    MEDICATIONS  (PRN):    Allergies    Topamax (Unknown)    Intolerances    LABS:             12.2   10.77 )-----------( 276      ( 31 Aug 2019 04:00 )             39.6     08-31    144  |  107  |  12  ----------------------------<  101<H>  4.3   |  24  |  0.52    Ca    9.1      31 Aug 2019 04:00  Phos  4.8     08-31  Mg     2.0     08-31    TPro  7.3  /  Alb  3.2<L>  /  TBili  0.3  /  DBili  x   /  AST  28  /  ALT  13  /  AlkPhos  71  08-31    Lactate Trend    CAPILLARY BLOOD GLUCOSE    8/31  Abnormal EEG study.  1. Moderate to severe nonspecific diffuse or multifocal cerebral dysfunction.   2. No epileptiform pattern or seizures seen.     08/30  Abnormal EEG study.  1. Moderate to severe nonspecific diffuse or multifocal cerebral dysfunction.   2. No epileptiform pattern or seizures seen.     08/29  Abnormal EEG study.  1. One diffuse onset subclinical seizure recorded at 11:50am that lasted 40 seconds.  2. Three push-button events for nonspecific movements or breathing related movement without abnormal EEG correlate.  3. Moderate to severe nonspecific diffuse or multifocal cerebral dysfunction.   Today's study improved significantly compared to prior 24hr recording due to resolution of seizure after 11:50.    08/28 EEG  Abnormal EEG study.  1. A total of 15 seizures captured from 16:00 to 09:00, each lasting 30-60s in duration. There was prolonged seizure freedom from 18:40 to 20:40 post lorazepam administration and from 01:20 to 04:00 post clobazam administration. No gross behavioral change seen on video. EEG onset was diffuse, at times with right or left hemispheric predominance.  2. Moderate nonspecific diffuse or multifocal cerebral dysfunction.   Today's study worsened compared to prior 24hr recording due to increase in seizure burden.

## 2019-08-31 NOTE — PROGRESS NOTE ADULT - ASSESSMENT
Ms. Ponce is a 54F with a PMH of cerebral palsy and seizure disorder .  patient presented with multiple seizures     EEG shows no active seizures at present     follow up EEG     on keppra ,Vimpat, valproic and onfi     appreciate ICU input monitoring infection triggers for seizures

## 2019-08-31 NOTE — CONSULT NOTE ADULT - ASSESSMENT
54 year old with CP and siezure disorder presneted with change in MS, respiratory failure, and altered mental status.    S/p tx for pna    Now with fever and hypotension      1) Pneumonia-risk for aspiration  S/p 5 days of zosyn      2) Hypothermia  Episodes of hypothermia both on an d off braod spectrum abx    3) Fever  Associated hypotension    Consider CT CAP    Consider bronch for culture    Check blood culture times two  Check UA    Meropenem/ vancomycin for now

## 2019-09-01 LAB
ALBUMIN SERPL ELPH-MCNC: 2.5 G/DL — LOW (ref 3.3–5)
ALP SERPL-CCNC: 70 U/L — SIGNIFICANT CHANGE UP (ref 40–120)
ALT FLD-CCNC: 11 U/L — SIGNIFICANT CHANGE UP (ref 4–33)
AMMONIA BLD-MCNC: 49 UMOL/L — SIGNIFICANT CHANGE UP (ref 11–55)
ANION GAP SERPL CALC-SCNC: 11 MMO/L — SIGNIFICANT CHANGE UP (ref 7–14)
AST SERPL-CCNC: 20 U/L — SIGNIFICANT CHANGE UP (ref 4–32)
BASOPHILS # BLD AUTO: 0.04 K/UL — SIGNIFICANT CHANGE UP (ref 0–0.2)
BASOPHILS NFR BLD AUTO: 0.5 % — SIGNIFICANT CHANGE UP (ref 0–2)
BILIRUB SERPL-MCNC: 0.3 MG/DL — SIGNIFICANT CHANGE UP (ref 0.2–1.2)
BUN SERPL-MCNC: 12 MG/DL — SIGNIFICANT CHANGE UP (ref 7–23)
CALCIUM SERPL-MCNC: 8.8 MG/DL — SIGNIFICANT CHANGE UP (ref 8.4–10.5)
CHLORIDE SERPL-SCNC: 108 MMOL/L — HIGH (ref 98–107)
CO2 SERPL-SCNC: 25 MMOL/L — SIGNIFICANT CHANGE UP (ref 22–31)
CREAT SERPL-MCNC: 0.36 MG/DL — LOW (ref 0.5–1.3)
CULTURE - ACID FAST SMEAR CONCENTRATED: SIGNIFICANT CHANGE UP
EOSINOPHIL # BLD AUTO: 0.44 K/UL — SIGNIFICANT CHANGE UP (ref 0–0.5)
EOSINOPHIL NFR BLD AUTO: 5.3 % — SIGNIFICANT CHANGE UP (ref 0–6)
GLUCOSE SERPL-MCNC: 112 MG/DL — HIGH (ref 70–99)
HCT VFR BLD CALC: 31.6 % — LOW (ref 34.5–45)
HGB BLD-MCNC: 9.9 G/DL — LOW (ref 11.5–15.5)
IMM GRANULOCYTES NFR BLD AUTO: 1.6 % — HIGH (ref 0–1.5)
LYMPHOCYTES # BLD AUTO: 2.07 K/UL — SIGNIFICANT CHANGE UP (ref 1–3.3)
LYMPHOCYTES # BLD AUTO: 25 % — SIGNIFICANT CHANGE UP (ref 13–44)
MAGNESIUM SERPL-MCNC: 1.8 MG/DL — SIGNIFICANT CHANGE UP (ref 1.6–2.6)
MCHC RBC-ENTMCNC: 31.3 % — LOW (ref 32–36)
MCHC RBC-ENTMCNC: 32.6 PG — SIGNIFICANT CHANGE UP (ref 27–34)
MCV RBC AUTO: 103.9 FL — HIGH (ref 80–100)
MONOCYTES # BLD AUTO: 1.07 K/UL — HIGH (ref 0–0.9)
MONOCYTES NFR BLD AUTO: 12.9 % — SIGNIFICANT CHANGE UP (ref 2–14)
NEUTROPHILS # BLD AUTO: 4.53 K/UL — SIGNIFICANT CHANGE UP (ref 1.8–7.4)
NEUTROPHILS NFR BLD AUTO: 54.7 % — SIGNIFICANT CHANGE UP (ref 43–77)
NRBC # FLD: 0 K/UL — SIGNIFICANT CHANGE UP (ref 0–0)
PHOSPHATE SERPL-MCNC: 3.8 MG/DL — SIGNIFICANT CHANGE UP (ref 2.5–4.5)
PLATELET # BLD AUTO: 257 K/UL — SIGNIFICANT CHANGE UP (ref 150–400)
PMV BLD: 9 FL — SIGNIFICANT CHANGE UP (ref 7–13)
POTASSIUM SERPL-MCNC: 3.7 MMOL/L — SIGNIFICANT CHANGE UP (ref 3.5–5.3)
POTASSIUM SERPL-SCNC: 3.7 MMOL/L — SIGNIFICANT CHANGE UP (ref 3.5–5.3)
PROT SERPL-MCNC: 6.2 G/DL — SIGNIFICANT CHANGE UP (ref 6–8.3)
RBC # BLD: 3.04 M/UL — LOW (ref 3.8–5.2)
RBC # FLD: 16 % — HIGH (ref 10.3–14.5)
SODIUM SERPL-SCNC: 144 MMOL/L — SIGNIFICANT CHANGE UP (ref 135–145)
SPECIMEN SOURCE: SIGNIFICANT CHANGE UP
SPECIMEN SOURCE: SIGNIFICANT CHANGE UP
VALPROATE SERPL-MCNC: 37.2 UG/ML — LOW (ref 50–100)
VANCOMYCIN TROUGH SERPL-MCNC: 21.6 UG/ML — HIGH (ref 10–20)
WBC # BLD: 8.28 K/UL — SIGNIFICANT CHANGE UP (ref 3.8–10.5)
WBC # FLD AUTO: 8.28 K/UL — SIGNIFICANT CHANGE UP (ref 3.8–10.5)

## 2019-09-01 PROCEDURE — 99291 CRITICAL CARE FIRST HOUR: CPT

## 2019-09-01 PROCEDURE — 99232 SBSQ HOSP IP/OBS MODERATE 35: CPT

## 2019-09-01 PROCEDURE — 95951: CPT | Mod: 26

## 2019-09-01 RX ORDER — POTASSIUM CHLORIDE 20 MEQ
40 PACKET (EA) ORAL ONCE
Refills: 0 | Status: COMPLETED | OUTPATIENT
Start: 2019-09-01 | End: 2019-09-01

## 2019-09-01 RX ORDER — MAGNESIUM SULFATE 500 MG/ML
2 VIAL (ML) INJECTION ONCE
Refills: 0 | Status: COMPLETED | OUTPATIENT
Start: 2019-09-01 | End: 2019-09-01

## 2019-09-01 RX ADMIN — LEVETIRACETAM 400 MILLIGRAM(S): 250 TABLET, FILM COATED ORAL at 22:14

## 2019-09-01 RX ADMIN — MEROPENEM 100 MILLIGRAM(S): 1 INJECTION INTRAVENOUS at 11:16

## 2019-09-01 RX ADMIN — Medication 28.75 MILLIGRAM(S): at 15:11

## 2019-09-01 RX ADMIN — SODIUM CHLORIDE 4 MILLILITER(S): 9 INJECTION INTRAMUSCULAR; INTRAVENOUS; SUBCUTANEOUS at 10:57

## 2019-09-01 RX ADMIN — LEVETIRACETAM 400 MILLIGRAM(S): 250 TABLET, FILM COATED ORAL at 14:46

## 2019-09-01 RX ADMIN — Medication 250 MILLIGRAM(S): at 06:24

## 2019-09-01 RX ADMIN — MEROPENEM 100 MILLIGRAM(S): 1 INJECTION INTRAVENOUS at 05:11

## 2019-09-01 RX ADMIN — Medication 50 GRAM(S): at 11:14

## 2019-09-01 RX ADMIN — CHLORHEXIDINE GLUCONATE 15 MILLILITER(S): 213 SOLUTION TOPICAL at 17:56

## 2019-09-01 RX ADMIN — Medication 6.4 MICROGRAM(S)/KG/MIN: at 07:46

## 2019-09-01 RX ADMIN — MEROPENEM 100 MILLIGRAM(S): 1 INJECTION INTRAVENOUS at 22:14

## 2019-09-01 RX ADMIN — OLANZAPINE 7.5 MILLIGRAM(S): 15 TABLET, FILM COATED ORAL at 23:35

## 2019-09-01 RX ADMIN — ENOXAPARIN SODIUM 40 MILLIGRAM(S): 100 INJECTION SUBCUTANEOUS at 12:18

## 2019-09-01 RX ADMIN — CHLORHEXIDINE GLUCONATE 1 APPLICATION(S): 213 SOLUTION TOPICAL at 06:26

## 2019-09-01 RX ADMIN — CLOBAZAM 10 MILLIGRAM(S): 10 TABLET ORAL at 07:13

## 2019-09-01 RX ADMIN — LACOSAMIDE 100 MILLIGRAM(S): 50 TABLET ORAL at 23:35

## 2019-09-01 RX ADMIN — Medication 28.75 MILLIGRAM(S): at 23:35

## 2019-09-01 RX ADMIN — LACOSAMIDE 100 MILLIGRAM(S): 50 TABLET ORAL at 14:45

## 2019-09-01 RX ADMIN — CHLORHEXIDINE GLUCONATE 15 MILLILITER(S): 213 SOLUTION TOPICAL at 06:24

## 2019-09-01 RX ADMIN — CLOBAZAM 10 MILLIGRAM(S): 10 TABLET ORAL at 17:56

## 2019-09-01 RX ADMIN — Medication 40 MILLIEQUIVALENT(S): at 11:14

## 2019-09-01 RX ADMIN — LEVETIRACETAM 400 MILLIGRAM(S): 250 TABLET, FILM COATED ORAL at 07:45

## 2019-09-01 RX ADMIN — SODIUM CHLORIDE 4 MILLILITER(S): 9 INJECTION INTRAMUSCULAR; INTRAVENOUS; SUBCUTANEOUS at 03:27

## 2019-09-01 RX ADMIN — Medication 28.75 MILLIGRAM(S): at 06:14

## 2019-09-01 RX ADMIN — ADENOSINE 6 MILLIGRAM(S): 3 INJECTION INTRAVENOUS at 23:05

## 2019-09-01 RX ADMIN — SODIUM CHLORIDE 4 MILLILITER(S): 9 INJECTION INTRAMUSCULAR; INTRAVENOUS; SUBCUTANEOUS at 16:56

## 2019-09-01 RX ADMIN — LACOSAMIDE 100 MILLIGRAM(S): 50 TABLET ORAL at 06:24

## 2019-09-01 RX ADMIN — Medication 1 APPLICATION(S): at 18:13

## 2019-09-01 RX ADMIN — Medication 1 APPLICATION(S): at 06:26

## 2019-09-01 NOTE — PROGRESS NOTE ADULT - SUBJECTIVE AND OBJECTIVE BOX
COVERING RESIDENT: MATT HU (PGY-1) | NS PAGER [phone] (NS) | LIEHSAN PAGER [phone]    INTERVAL HPI/OVERNIGHT EVENTS: Ativan was stopped yesterday and abx broadened t vanc/jesusita. Pt was hypothermic overnight - T 35.1. EEG yesterday without seizure activity     SUBJECTIVE: Patient seen and examined at bedside.       OBJECTIVE:    VITAL SIGNS:  ICU Vital Signs Last 24 Hrs  T(C): 35.1 (01 Sep 2019 04:00), Max: 37.2 (31 Aug 2019 08:00)  T(F): 95.2 (01 Sep 2019 04:00), Max: 98.9 (31 Aug 2019 08:00)  HR: 53 (01 Sep 2019 06:00) (42 - 100)  BP: 119/49 (01 Sep 2019 06:00) (82/37 - 149/82)  BP(mean): 68 (01 Sep 2019 06:00) (51 - 101)  RR: 12 (01 Sep 2019 06:00) (12 - 21)  SpO2: 99% (01 Sep 2019 06:00) (91% - 100%)    Mode: AC/ CMV (Assist Control/ Continuous Mandatory Ventilation), RR (machine): 12, TV (machine): 360, FiO2: 30, PEEP: 5, MAP: 9, PIP: 24      08-31 @ 07:01  -  09-01 @ 06:18  --------------------------------------------------------  IN: 3953 mL / OUT: 2250 mL / NET: 1703 mL        PHYSICAL EXAM:    General: NAD  HEENT: NC/AT; PERRL, clear conjunctiva  Neck: supple  Respiratory: CTA b/l  Cardiovascular: +S1/S2; RRR  Abdomen: soft, NT/ND; +BS x4  Extremities: WWP, 2+ peripheral pulses b/l; no LE edema  Skin: normal color and turgor; no rash  Neurological:    MEDICATIONS:  MEDICATIONS  (STANDING):  chlorhexidine 0.12% Liquid 15 milliLiter(s) Oral Mucosa every 12 hours  chlorhexidine 4% Liquid 1 Application(s) Topical <User Schedule>  Clobazam Suspension 10 milliGRAM(s) Oral two times a day  enoxaparin Injectable 40 milliGRAM(s) SubCutaneous daily  lacosamide Solution 100 milliGRAM(s) Oral <User Schedule>  levETIRAcetam  IVPB 1000 milliGRAM(s) IV Intermittent <User Schedule>  meropenem  IVPB 1000 milliGRAM(s) IV Intermittent every 8 hours  meropenem  IVPB      norepinephrine Infusion 0.05 MICROgram(s)/kG/Min (6.403 mL/Hr) IV Continuous <Continuous>  OLANZapine 7.5 milliGRAM(s) Oral at bedtime  petrolatum Ophthalmic Ointment 1 Application(s) Both EYES two times a day  sodium chloride 3%  Inhalation 4 milliLiter(s) Inhalation every 6 hours  valproate sodium IVPB 750 milliGRAM(s) IV Intermittent every 8 hours  vancomycin  IVPB      vancomycin  IVPB 1000 milliGRAM(s) IV Intermittent every 12 hours    MEDICATIONS  (PRN):      ALLERGIES:  Allergies    Topamax (Unknown)    Intolerances        LABS:                        12.2   10.77 )-----------( 276      ( 31 Aug 2019 04:00 )             39.6     WBC: 10.77 <- 7.25  Hgb 12.2 <- 8.2  Plt: 276 <- 185    08-31    144  |  107  |  12  ----------------------------<  101<H>  4.3   |  24  |  0.52    Ca    9.1      31 Aug 2019 04:00  Phos  4.8     08-31  Mg     2.0     08-31    TPro  7.3  /  Alb  3.2<L>  /  TBili  0.3  /  DBili  x   /  AST  28  /  ALT  13  /  AlkPhos  71  08-31    folate >20  b12 1540    levetiracetam level: 62.3          RADIOLOGY & ADDITIONAL TESTS: no new imaging. COVERING RESIDENT: MATT HU (PGY-1) | NS PAGER [phone] (NS) | PILAR PAGER [phone]    INTERVAL HPI/OVERNIGHT EVENTS: Ativan was stopped yesterday and abx broadened t vanc/jesusita. Pt was hypothermic overnight - T 35.1. EEG yesterday without seizure activity     SUBJECTIVE: Patient seen and examined at bedside. A&Ox0, obtunded, intubated.       OBJECTIVE:    VITAL SIGNS:  ICU Vital Signs Last 24 Hrs  T(C): 35.1 (01 Sep 2019 04:00), Max: 37.2 (31 Aug 2019 08:00)  T(F): 95.2 (01 Sep 2019 04:00), Max: 98.9 (31 Aug 2019 08:00)  HR: 53 (01 Sep 2019 06:00) (42 - 100)  BP: 119/49 (01 Sep 2019 06:00) (82/37 - 149/82)  BP(mean): 68 (01 Sep 2019 06:00) (51 - 101)  RR: 12 (01 Sep 2019 06:00) (12 - 21)  SpO2: 99% (01 Sep 2019 06:00) (91% - 100%)    Mode: AC/ CMV (Assist Control/ Continuous Mandatory Ventilation), RR (machine): 12, TV (machine): 360, FiO2: 30, PEEP: 5, MAP: 9, PIP: 24      08-31 @ 07:01  -  09-01 @ 06:18  --------------------------------------------------------  IN: 3953 mL / OUT: 2250 mL / NET: 1703 mL        PHYSICAL EXAM:    General: obtunded, intubated.  HEENT: NC/AT  Neck: supple  Respiratory: CTA b/l  Cardiovascular: +S1/S2; tachycardic  Abdomen: soft, NT/ND; +BS   Extremities: WWP, 2+ peripheral pulses b/l; no LE edema  Skin: normal color and turgor; no rash      MEDICATIONS:  MEDICATIONS  (STANDING):  chlorhexidine 0.12% Liquid 15 milliLiter(s) Oral Mucosa every 12 hours  chlorhexidine 4% Liquid 1 Application(s) Topical <User Schedule>  Clobazam Suspension 10 milliGRAM(s) Oral two times a day  enoxaparin Injectable 40 milliGRAM(s) SubCutaneous daily  lacosamide Solution 100 milliGRAM(s) Oral <User Schedule>  levETIRAcetam  IVPB 1000 milliGRAM(s) IV Intermittent <User Schedule>  meropenem  IVPB 1000 milliGRAM(s) IV Intermittent every 8 hours  meropenem  IVPB      norepinephrine Infusion 0.05 MICROgram(s)/kG/Min (6.403 mL/Hr) IV Continuous <Continuous>  OLANZapine 7.5 milliGRAM(s) Oral at bedtime  petrolatum Ophthalmic Ointment 1 Application(s) Both EYES two times a day  sodium chloride 3%  Inhalation 4 milliLiter(s) Inhalation every 6 hours  valproate sodium IVPB 750 milliGRAM(s) IV Intermittent every 8 hours  vancomycin  IVPB      vancomycin  IVPB 1000 milliGRAM(s) IV Intermittent every 12 hours          LABS:                        12.2   10.77 )-----------( 276      ( 31 Aug 2019 04:00 )             39.6     WBC: 10.77 <- 7.25  Hgb 12.2 <- 8.2  Plt: 276 <- 185    08-31    144  |  107  |  12  ----------------------------<  101<H>  4.3   |  24  |  0.52    Ca    9.1      31 Aug 2019 04:00  Phos  4.8     08-31  Mg     2.0     08-31    TPro  7.3  /  Alb  3.2<L>  /  TBili  0.3  /  DBili  x   /  AST  28  /  ALT  13  /  AlkPhos  71  08-31    folate >20  b12 1540    levetiracetam level: 62.3    ammonia: 49      RADIOLOGY & ADDITIONAL TESTS: no new imaging. COVERING RESIDENT: MATT HU (PGY-1) | NS PAGER [phone] (NS) | PILAR PAGER [phone]    INTERVAL HPI/OVERNIGHT EVENTS: Ativan was stopped yesterday and abx broadened t vanc/jesusita. Pt was hypothermic overnight - T 35.1. EEG yesterday and today without seizure activity     SUBJECTIVE: Patient seen and examined at bedside. A&Ox0, obtunded, intubated. Pt was intermittently in AFlutter this morning - self resolved, now in sinus rhythm.       OBJECTIVE:    VITAL SIGNS:  ICU Vital Signs Last 24 Hrs  T(C): 35.1 (01 Sep 2019 04:00), Max: 37.2 (31 Aug 2019 08:00)  T(F): 95.2 (01 Sep 2019 04:00), Max: 98.9 (31 Aug 2019 08:00)  HR: 53 (01 Sep 2019 06:00) (42 - 100)  BP: 119/49 (01 Sep 2019 06:00) (82/37 - 149/82)  BP(mean): 68 (01 Sep 2019 06:00) (51 - 101)  RR: 12 (01 Sep 2019 06:00) (12 - 21)  SpO2: 99% (01 Sep 2019 06:00) (91% - 100%)    Mode: AC/ CMV (Assist Control/ Continuous Mandatory Ventilation), RR (machine): 12, TV (machine): 360, FiO2: 30, PEEP: 5, MAP: 9, PIP: 24      08-31 @ 07:01  -  09-01 @ 06:18  --------------------------------------------------------  IN: 3953 mL / OUT: 2250 mL / NET: 1703 mL        PHYSICAL EXAM:    General: obtunded, intubated.  HEENT: NC/AT  Neck: supple  Respiratory: CTA b/l  Cardiovascular: +S1/S2; tachycardic  Abdomen: soft, NT/ND; +BS   Extremities: WWP, 2+ peripheral pulses b/l; no LE edema  Skin: normal color and turgor; no rash      MEDICATIONS:  MEDICATIONS  (STANDING):  chlorhexidine 0.12% Liquid 15 milliLiter(s) Oral Mucosa every 12 hours  chlorhexidine 4% Liquid 1 Application(s) Topical <User Schedule>  Clobazam Suspension 10 milliGRAM(s) Oral two times a day  enoxaparin Injectable 40 milliGRAM(s) SubCutaneous daily  lacosamide Solution 100 milliGRAM(s) Oral <User Schedule>  levETIRAcetam  IVPB 1000 milliGRAM(s) IV Intermittent <User Schedule>  meropenem  IVPB 1000 milliGRAM(s) IV Intermittent every 8 hours  meropenem  IVPB      norepinephrine Infusion 0.05 MICROgram(s)/kG/Min (6.403 mL/Hr) IV Continuous <Continuous>  OLANZapine 7.5 milliGRAM(s) Oral at bedtime  petrolatum Ophthalmic Ointment 1 Application(s) Both EYES two times a day  sodium chloride 3%  Inhalation 4 milliLiter(s) Inhalation every 6 hours  valproate sodium IVPB 750 milliGRAM(s) IV Intermittent every 8 hours  vancomycin  IVPB      vancomycin  IVPB 1000 milliGRAM(s) IV Intermittent every 12 hours          LABS:                        12.2   10.77 )-----------( 276      ( 31 Aug 2019 04:00 )             39.6     WBC: 10.77 <- 7.25  Hgb 12.2 <- 8.2  Plt: 276 <- 185    08-31    144  |  107  |  12  ----------------------------<  101<H>  4.3   |  24  |  0.52    Ca    9.1      31 Aug 2019 04:00  Phos  4.8     08-31  Mg     2.0     08-31    TPro  7.3  /  Alb  3.2<L>  /  TBili  0.3  /  DBili  x   /  AST  28  /  ALT  13  /  AlkPhos  71  08-31    folate >20  b12 1540    levetiracetam level: 62.3    ammonia: 49    BCx 8/31 NGTD      RADIOLOGY & ADDITIONAL TESTS: no new imaging.

## 2019-09-01 NOTE — PROGRESS NOTE ADULT - ASSESSMENT
54 year old with CP and siezure disorder presneted with change in MS, respiratory failure, and altered mental status.    S/p tx for pna    Now with fever and hypotension    Discussed with MICU - new consolidation on chest imaging     1) Pneumonia-risk for aspiration  S/p 5 days of zosyn    Check Sputum culture    Plan 5-7 days of broad spcetrum abx.     2) Hypothermia  Episodes of hypothermia both on an d off braod spectrum abx    3) Fever  Associated hypotension  Better today.     Consider CT CAP if remains unstable    Case discussed with ICU team. Plan is to treat for pneumonia    Will sign off.  Call ID service if we can be of further assistance.

## 2019-09-01 NOTE — PROGRESS NOTE ADULT - ATTENDING COMMENTS
pt remains on pressor support for sepsis, shock state secondary to pna. lung US demonstrated Left sided consolidation with simple small pleural effusion, too small to drain safely. D/w pt's brother regarding the length of ventilator requirement now 2 weeks total and pt likely has sepsis. Off ativan, remains on AEDS, f/u with neuro.

## 2019-09-01 NOTE — EEG REPORT - NS EEG TEXT BOX
[facility]   COMPREHENSIVE EPILEPSY CENTER   REPORT OF CONTINUOUS VIDEO EEG     [facility]: [address], [place] [address], Ph#: [phone]  [facility]: [address], [place] [address], Ph#: [phone]  Office: [address] [place] [address], [place] [address], [place] [address] Ph#: [phone]    Patient Name: NAYANA JACQUES  Age and : 54y ([birth year])  MRN #: [id number]  Location: [place] [address]  Referring Physician: Roberth Renner    Study Date: 19 10:54 - 19 09:05  _____________________________________________________________  STUDY INFORMATION    EEG Recording Technique:  The patient underwent continuous Video-EEG monitoring, using Telemetry System hardware on the XLTek Digital System. EEG and video data were stored on a computer hard drive with important events saved in digital archive files. The material was reviewed by a physician (electroencephalographer / epileptologist) on a daily basis. Valerio and seizure detection algorithms were utilized and reviewed. An EEG Technician attended to the patient, and was available throughout daytime work hours.  The epilepsy center neurologist was available in person or on call 24-hours per day.    EEG Placement and Labeling of Electrodes:  The EEG was performed utilizing 20 channel referential EEG connections (coronal over temporal over parasagittal montage) using all standard 10-20 electrode placements with EKG, with additional electrodes placed in the inferior temporal region using the modified 10-10 montage electrode placements for elective admissions, or if deemed necessary. Recording was at a sampling rate of 256 samples per second per channel. Time synchronized digital video recording was done simultaneously with EEG recording. A low light infrared camera was used for low light recording.     _____________________________________________________________  HISTORY    Patient is a 54y old  Female who presents with a chief complaint of Septic Shock (01 Sep 2019 10:17)      PERTINENT MEDICATION:  MEDICATIONS  (STANDING):  Clobazam Suspension 10 milliGRAM(s) Oral two times a day  lacosamide Solution 100 milliGRAM(s) Oral <User Schedule>  levETIRAcetam  IVPB 1000 milliGRAM(s) IV Intermittent <User Schedule>  ________________________________________________________  STUDY INTERPRETATION    Findings: The record is unchanged from that of the prior 24h. The background was continuous, spontaneously variable and reactive. No posterior dominant rhythm seen.    Background Slowing:  Diffuse theta and polymorphic delta slowing.    Focal Slowing:   None were present.    Sleep Background:  Drowsiness was characterized by fragmentation, attenuation, and slowing of the background activity.    Stage II sleep transients were not recorded.    Other Non-Epileptiform Findings:  None were present.    Interictal Epileptiform Activity:   None were present.    Events:  One push button activation at 02:10 for unclear reasons is associated with no changes in ongoing EEG activities. No seizures were recorded.     Activation Procedures:   Hyperventilation was not performed.    Photic stimulation was not performed.     Artifacts:  Intermittent myogenic and movement artifacts were noted.    ECG:  The heart rate on single channel ECG was predominantly between 60-70 BPM.    _____________________________________________________________  EEG SUMMARY/CLASSIFICATION      Abnormal EEG in a sedated patient.  - Moderate to severe generalized slowing.    _____________________________________________________________  EEG IMPRESSION/CLINICAL CORRELATE    Abnormal EEG study.  1. Moderate to severe nonspecific diffuse or multifocal cerebral dysfunction.   2. No epileptiform pattern or seizures seen.     _____________________________________________________________    Jenny Theroux, MD  Epilepsy Fellow [facility]   COMPREHENSIVE EPILEPSY CENTER   REPORT OF CONTINUOUS VIDEO EEG     [facility]: [address], [place] [address], Ph#: [phone]  [facility]: [address], [place] [address], Ph#: [phone]  Office: [address] [place] [address], [place] [address], [place] [address] Ph#: [phone]    Patient Name: NAYANA JACQUES  Age and : 54y (67)  MRN #: [id number]  Location: [place] [address]  Referring Physician: Roberth Renner    Study Date: 19 10:54 - 19 09:05  _____________________________________________________________  STUDY INFORMATION    EEG Recording Technique:  The patient underwent continuous Video-EEG monitoring, using Telemetry System hardware on the XLTek Digital System. EEG and video data were stored on a computer hard drive with important events saved in digital archive files. The material was reviewed by a physician (electroencephalographer / epileptologist) on a daily basis. Valerio and seizure detection algorithms were utilized and reviewed. An EEG Technician attended to the patient, and was available throughout daytime work hours.  The epilepsy center neurologist was available in person or on call 24-hours per day.    EEG Placement and Labeling of Electrodes:  The EEG was performed utilizing 20 channel referential EEG connections (coronal over temporal over parasagittal montage) using all standard 10-20 electrode placements with EKG, with additional electrodes placed in the inferior temporal region using the modified 10-10 montage electrode placements for elective admissions, or if deemed necessary. Recording was at a sampling rate of 256 samples per second per channel. Time synchronized digital video recording was done simultaneously with EEG recording. A low light infrared camera was used for low light recording.     _____________________________________________________________  HISTORY    Patient is a 54y old  Female who presents with a chief complaint of Septic Shock (01 Sep 2019 10:17)      PERTINENT MEDICATION:  MEDICATIONS  (STANDING):  Clobazam Suspension 10 milliGRAM(s) Oral two times a day  lacosamide Solution 100 milliGRAM(s) Oral <User Schedule>  levETIRAcetam  IVPB 1000 milliGRAM(s) IV Intermittent <User Schedule>  ________________________________________________________  STUDY INTERPRETATION    Findings: The record is unchanged from that of the prior 24h. The background was continuous, spontaneously variable and reactive. No posterior dominant rhythm seen.     Background Slowing:  Diffuse theta and polymorphic delta slowing.    Focal Slowing:   None were present.    Sleep Background:  Drowsiness was characterized by fragmentation, attenuation, and slowing of the background activity.    Stage II sleep transients was characterized by symmetrical sleep spindles.    Other Non-Epileptiform Findings:  None were present.    Interictal Epileptiform Activity:   None were present.    Events:  One push button activation at 02:10 for unclear reasons is associated with no changes in ongoing EEG activities. No seizures were recorded.     Activation Procedures:   Hyperventilation was not performed.    Photic stimulation was not performed.     Artifacts:  Intermittent myogenic and movement artifacts were noted.    ECG:  The heart rate on single channel ECG was predominantly between 60-70 BPM.    _____________________________________________________________  EEG SUMMARY/CLASSIFICATION      Abnormal EEG in a sedated patient.  - Moderate generalized slowing.    _____________________________________________________________  EEG IMPRESSION/CLINICAL CORRELATE    Abnormal EEG study.  1. Mild to moderate nonspecific diffuse or multifocal cerebral dysfunction.   2. No epileptiform pattern or seizures seen.     _____________________________________________________________    Jenny Padilla MD  Epilepsy Fellow    Gonsalo Rojo MD  EEG Attending

## 2019-09-01 NOTE — PROGRESS NOTE ADULT - SUBJECTIVE AND OBJECTIVE BOX
Follow Up:      Inverval History/ROS:Patient is a 54y old  Female who presents with a chief complaint of Septic Shock (01 Sep 2019 06:17)    Afebrile.  On vent    Allergies    Topamax (Unknown)    Intolerances        ANTIMICROBIALS:  meropenem  IVPB 1000 every 8 hours  meropenem  IVPB    vancomycin  IVPB    vancomycin  IVPB 1000 every 12 hours      OTHER MEDS:  chlorhexidine 0.12% Liquid 15 milliLiter(s) Oral Mucosa every 12 hours  chlorhexidine 4% Liquid 1 Application(s) Topical <User Schedule>  Clobazam Suspension 10 milliGRAM(s) Oral two times a day  enoxaparin Injectable 40 milliGRAM(s) SubCutaneous daily  lacosamide Solution 100 milliGRAM(s) Oral <User Schedule>  levETIRAcetam  IVPB 1000 milliGRAM(s) IV Intermittent <User Schedule>  magnesium sulfate  IVPB 2 Gram(s) IV Intermittent once  norepinephrine Infusion 0.05 MICROgram(s)/kG/Min IV Continuous <Continuous>  OLANZapine 7.5 milliGRAM(s) Oral at bedtime  petrolatum Ophthalmic Ointment 1 Application(s) Both EYES two times a day  potassium chloride   Powder 40 milliEquivalent(s) Oral once  sodium chloride 3%  Inhalation 4 milliLiter(s) Inhalation every 6 hours  valproate sodium IVPB 750 milliGRAM(s) IV Intermittent every 8 hours      Vital Signs Last 24 Hrs  T(C): 35.6 (01 Sep 2019 08:00), Max: 37.1 (31 Aug 2019 16:00)  T(F): 96 (01 Sep 2019 08:00), Max: 98.8 (31 Aug 2019 16:00)  HR: 130 (01 Sep 2019 09:00) (42 - 130)  BP: 79/49 (01 Sep 2019 09:00) (79/46 - 149/82)  BP(mean): 60 (01 Sep 2019 09:00) (51 - 101)  RR: 12 (01 Sep 2019 09:00) (12 - 16)  SpO2: 97% (01 Sep 2019 09:00) (91% - 100%)    PHYSICAL EXAM:  General: [ ] non-toxic  HEAD/EYES: [ ] PERRL [ x] white sclera [ ] icterus  ENT:  [ ] normal [ ] supple [ ] thrush [ ] pharyngeal exudate  Cardiovascular:   [ ] murmur [x ] normal [ ] PPM/AICD  Respiratory:  [x ] course BS  GI:  [x ] soft, non-tender, normal bowel sounds  :  [ ] mancilla [ ] no CVA tenderness   Musculoskeletal:  [ ] no synovitis  Neurologic:  [ ] non-focal exam   Skin:  [x ] no rash  Lymph: [x ] no lymphadenopathy  Psychiatric:  [ ] appropriate affect [ ] alert & oriented  Lines:  [ x] no phlebitis [ ] central line                                9.9    8.28  )-----------( 257      ( 01 Sep 2019 05:30 )             31.6       09-01    144  |  108<H>  |  12  ----------------------------<  112<H>  3.7   |  25  |  0.36<L>    Ca    8.8      01 Sep 2019 05:30  Phos  3.8     09-01  Mg     1.8     09-01    TPro  6.2  /  Alb  2.5<L>  /  TBili  0.3  /  DBili  x   /  AST  20  /  ALT  11  /  AlkPhos  70  09-01          MICROBIOLOGY:    RADIOLOGY:

## 2019-09-01 NOTE — PROGRESS NOTE ADULT - ASSESSMENT
Ms. Ponce is a 54F with a PMH of cerebral palsy and seizure disorder presenting from group home for acute AMS and increased work of breathing with recent seizure found to be hypothermic, hypotensive and bradycardic with leukocytosis and imaging showing new right lung opacification concerning for septic shock 2/2 to aspiration PNA requiring pressors and airway support, intermittently requiring pressors, seizures controlled with 5 AEDs.     NEURO: Patient has a history of cerebral palsy and seizure disorder. Baseline mental status minimally verbal but interactive/alert and withdraws to pain. Neurology on board. Lethargic currently 2/2 to metabolic encephalopathy due to infection.  -Intubated due to poor mental status s/p seizures concern for possible status epilepticus and hypercarbic respiratory failure  -Potential epileptogenic focus in the right fronto-temporal region shown on VEEG  -Spoke with Neuro in regards to pt's AEDs: valproate 750mg TID, levetiracetam 1000mg TID. Load lacosamide 200 mg IV, [id number] for maintenance. Now off lorazepam 2mg q6.   -Neuro recs appreciated. Will f/u with levels in AM, ammonia. Titrate lorazepam on EEG to monitor seizures  -Restarted patient on home olanzapine - can lower seizure threshold, will consider d/cing.     CARDIOVASCULAR: No history of cardiac disease. However found to be hypotensive and bradycardic in the setting of sepsis. Pressor support improved SBPs and bradycardia.  -Norepi gtt titrating for goal MAP > 60     PULMONARY: Hypothermic with lung imaging concerning for new right lung opacification. On POCUS see signs of consolidation likely aspiration PNA   - S/p intubation in MICU for poor mental status and inability to maintain airway, as well as hypercarbic respiratory failure pCO2s 50s to 70s  -When plan to extubate will need steroids to minimize vocal cord edema  -Aspiration precautions  -Sputum Cx no growth  -on rpt CPAP, patient has low tidal volume, right shift cardiac frame, suspect atelectasis of LLB, may attempt bronchoscopy to clear mucus plug if present    GI: Patient has a history of enteritis in the past but no GI symptoms currently. Eats pureed foods.  - Nutrition recs appreciated, OGT feeding initiated  - Aspiration precautions     RENAL:   - Purewick catheter in place with clear urine output  - Replete electrolytes PRN    INFECTIOUS DISEASE:   - On admission, right lung opacification with hypothermia and leukocytosis meeting SIRS criteria with presumable source aspiration PNA complicated by hypotension 2/2 to septic shock. UA negative.   - If patient remains on high doses of pressor support will consider CT chest/abdomen/pelvis to rule out occult source of infection given also has a recent history of enteritis requiring MICU admission  -Follow up blood cultures  -s/p zosyn for aspiration pneumonia with last day 8/23  -Legionella negative  -CBCs with diff daily  -Sputum cultures showed no growth for 24h  -Pt now with increasing pressure requirement, febrile, may be in setting of new infection with sepsis  -F/u bcx, sputum cx, sputum acid fast  -Empirically started on meropenem and vanc (8/31)  - consider CT CAP, bronch for culture    HEMATOLOGY: has thrombocytopenia but has had thrombocytopenia in the past could be 2/2 to infection  -Worsening thrombocytopenia, d/c'd heparin for now, possible cause Depakote went down on patient's Depakote dose, will trend platelets   - Platelets stable  - Macrocytic anemia, B12, folate - elevated    ENDOCRINE:  - No active issues    SKIN:  - Few blanching pressure wounds on back of feet and sacral area     DVT PPx:  - enoxaparin 40 mg subQ qd    Code Status: Full Ms. Ponce is a 54F with a PMH of cerebral palsy and seizure disorder presenting from group home for acute AMS and increased work of breathing with recent seizure found to be hypothermic, hypotensive and bradycardic with leukocytosis and imaging showing new right lung opacification concerning for septic shock 2/2 to aspiration PNA requiring pressors and airway support, intermittently requiring pressors, seizures controlled with 5 AEDs.     NEURO: Patient has a history of cerebral palsy and seizure disorder. Baseline mental status minimally verbal but interactive/alert and withdraws to pain. Neurology on board. Lethargic currently 2/2 to metabolic encephalopathy due to infection.  -Intubated due to poor mental status s/p seizures concern for possible status epilepticus and hypercarbic respiratory failure  -Potential epileptogenic focus in the right fronto-temporal region shown on VEEG  -Spoke with Neuro in regards to pt's AEDs: valproate 750mg TID, levetiracetam 1000mg TID. Load lacosamide 200 mg IV, [id number] for maintenance. Now off lorazepam 2mg q6.   -Neuro recs appreciated.  -Restarted patient on home olanzapine - can lower seizure threshold, will consider d/cing.     CARDIOVASCULAR: No history of cardiac disease. However found to be hypotensive and bradycardic in the setting of sepsis. Pressor support improved SBPs and bradycardia.  -Norepi gtt titrating for goal MAP > 60     PULMONARY: Hypothermic with lung imaging concerning for new right lung opacification. On POCUS see signs of consolidation likely aspiration PNA   - S/p intubation in MICU for poor mental status and inability to maintain airway, as well as hypercarbic respiratory failure pCO2s 50s to 70s  -When plan to extubate will need steroids to minimize vocal cord edema  -Aspiration precautions  -Sputum Cx no growth  -on rpt CPAP, patient has low tidal volume, right shift cardiac frame, suspect atelectasis of LLB, may attempt bronchoscopy to clear mucus plug if present    GI: Patient has a history of enteritis in the past but no GI symptoms currently. Eats pureed foods.  - Nutrition recs appreciated, OGT feeding initiated  - Aspiration precautions     RENAL:   - Purewick catheter in place with clear urine output  - Replete electrolytes PRN    INFECTIOUS DISEASE:   - On admission, right lung opacification with hypothermia and leukocytosis meeting SIRS criteria with presumable source aspiration PNA complicated by hypotension 2/2 to septic shock. UA negative.   - If patient remains on high doses of pressor support will consider CT chest/abdomen/pelvis to rule out occult source of infection given also has a recent history of enteritis requiring MICU admission  -Follow up blood cultures  -s/p zosyn for aspiration pneumonia with last day 8/23  -Legionella negative  -CBCs with diff daily  -Sputum cultures showed no growth for 24h  -Pt now with increasing pressure requirement, febrile, may be in setting of new infection with sepsis  -F/u bcx, sputum cx, sputum acid fast  -Empirically started on meropenem and vanc (8/31)  - consider CT CAP, bronch for culture    HEMATOLOGY: has thrombocytopenia but has had thrombocytopenia in the past could be 2/2 to infection  -Worsening thrombocytopenia, d/c'd heparin for now, possible cause Depakote went down on patient's Depakote dose, will trend platelets   - Platelets stable  - Macrocytic anemia, B12, folate - elevated    ENDOCRINE:  - No active issues    SKIN:  - Few blanching pressure wounds on back of feet and sacral area     DVT PPx:  - enoxaparin 40 mg subQ qd    Code Status: Full Ms. Ponce is a 54F with a PMH of cerebral palsy and seizure disorder presenting from group home for acute AMS and increased work of breathing with recent seizure found to be hypothermic, hypotensive and bradycardic with leukocytosis and imaging showing new right lung opacification concerning for septic shock 2/2 to aspiration PNA requiring pressors and airway support, intermittently requiring pressors, seizures controlled with 5 AEDs.     NEURO: Patient has a history of cerebral palsy and seizure disorder. Baseline mental status minimally verbal but interactive/alert and withdraws to pain. Neurology on board. Lethargic currently 2/2 to metabolic encephalopathy due to infection.  -Intubated due to poor mental status s/p seizures concern for possible status epilepticus and hypercarbic respiratory failure  -Potential epileptogenic focus in the right fronto-temporal region shown on VEEG  -Spoke with Neuro in regards to pt's AEDs: valproate 750mg TID, levetiracetam 1000mg TID. Load lacosamide 200 mg IV, [id number] for maintenance. Now off lorazepam 2mg q6.   -Neuro recs appreciated.  -Restarted patient on home olanzapine - can lower seizure threshold, will consider d/cing.   - VPA level subtherapeutic - f/u with neuro rec regarding VPA dosing.     CARDIOVASCULAR: No history of cardiac disease. However found to be hypotensive and bradycardic in the setting of sepsis. Pressor support improved SBPs and bradycardia.  -Norepi gtt titrating for goal MAP > 60     PULMONARY: Hypothermic with lung imaging concerning for new right lung opacification. On POCUS see signs of consolidation likely aspiration PNA   - S/p intubation in MICU for poor mental status and inability to maintain airway, as well as hypercarbic respiratory failure pCO2s 50s to 70s  -When plan to extubate will need steroids to minimize vocal cord edema  -Aspiration precautions  -Sputum Cx no growth  -on rpt CPAP, patient has low tidal volume, right shift cardiac frame, suspect atelectasis of LLB, may attempt bronchoscopy to clear mucus plug if present    GI: Patient has a history of enteritis in the past but no GI symptoms currently. Eats pureed foods.  - Nutrition recs appreciated, OGT feeding initiated  - Aspiration precautions     RENAL:   - Purewick catheter in place with clear urine output  - Replete electrolytes PRN    INFECTIOUS DISEASE:   - On admission, right lung opacification with hypothermia and leukocytosis meeting SIRS criteria with presumable source aspiration PNA complicated by hypotension 2/2 to septic shock. UA negative.   - If patient remains on high doses of pressor support will consider CT chest/abdomen/pelvis to rule out occult source of infection given also has a recent history of enteritis requiring MICU admission  -Follow up blood cultures  -s/p zosyn for aspiration pneumonia with last day 8/23  -Legionella negative  -CBCs with diff daily  -Sputum cultures showed no growth for 24h  -Pt now with increasing pressure requirement, febrile, may be in setting of new infection with sepsis - likely PNA, bedside POCUS with LLL lung consolidation  -F/u bcx, sputum cx, sputum acid fast  -on meropenem and vanc (8/31) for likely PNA      HEMATOLOGY: has thrombocytopenia but has had thrombocytopenia in the past could be 2/2 to infection  -Worsening thrombocytopenia, d/c'd heparin for now, possible cause Depakote went down on patient's Depakote dose, will trend platelets   - Platelets stable  - Macrocytic anemia, B12, folate - elevated    ENDOCRINE:  - No active issues    SKIN:  - Few blanching pressure wounds on back of feet and sacral area     DVT PPx:  - enoxaparin 40 mg subQ qd    Code Status: Full

## 2019-09-02 LAB
ALBUMIN SERPL ELPH-MCNC: 2.9 G/DL — LOW (ref 3.3–5)
ALP SERPL-CCNC: 78 U/L — SIGNIFICANT CHANGE UP (ref 40–120)
ALT FLD-CCNC: 15 U/L — SIGNIFICANT CHANGE UP (ref 4–33)
ANION GAP SERPL CALC-SCNC: 9 MMO/L — SIGNIFICANT CHANGE UP (ref 7–14)
APTT BLD: 32.1 SEC — SIGNIFICANT CHANGE UP (ref 27.5–36.3)
AST SERPL-CCNC: 26 U/L — SIGNIFICANT CHANGE UP (ref 4–32)
BASE EXCESS BLDA CALC-SCNC: 1.8 MMOL/L — SIGNIFICANT CHANGE UP
BASOPHILS # BLD AUTO: 0.04 K/UL — SIGNIFICANT CHANGE UP (ref 0–0.2)
BASOPHILS NFR BLD AUTO: 0.4 % — SIGNIFICANT CHANGE UP (ref 0–2)
BILIRUB SERPL-MCNC: 0.2 MG/DL — SIGNIFICANT CHANGE UP (ref 0.2–1.2)
BLOOD GAS ARTERIAL - FIO2: 40 — SIGNIFICANT CHANGE UP
BUN SERPL-MCNC: 11 MG/DL — SIGNIFICANT CHANGE UP (ref 7–23)
CALCIUM SERPL-MCNC: 8.6 MG/DL — SIGNIFICANT CHANGE UP (ref 8.4–10.5)
CHLORIDE BLDA-SCNC: 112 MMOL/L — HIGH (ref 96–108)
CHLORIDE SERPL-SCNC: 109 MMOL/L — HIGH (ref 98–107)
CO2 SERPL-SCNC: 24 MMOL/L — SIGNIFICANT CHANGE UP (ref 22–31)
CREAT SERPL-MCNC: 0.47 MG/DL — LOW (ref 0.5–1.3)
EOSINOPHIL # BLD AUTO: 0.64 K/UL — HIGH (ref 0–0.5)
EOSINOPHIL NFR BLD AUTO: 5.7 % — SIGNIFICANT CHANGE UP (ref 0–6)
GLUCOSE BLDA-MCNC: 124 MG/DL — HIGH (ref 70–99)
GLUCOSE SERPL-MCNC: 129 MG/DL — HIGH (ref 70–99)
HCO3 BLDA-SCNC: 26 MMOL/L — SIGNIFICANT CHANGE UP (ref 22–26)
HCT VFR BLD CALC: 35.6 % — SIGNIFICANT CHANGE UP (ref 34.5–45)
HCT VFR BLDA CALC: 34.8 % — SIGNIFICANT CHANGE UP (ref 34.5–46.5)
HGB BLD-MCNC: 11.2 G/DL — LOW (ref 11.5–15.5)
HGB BLDA-MCNC: 11.3 G/DL — LOW (ref 11.5–15.5)
IMM GRANULOCYTES NFR BLD AUTO: 1.5 % — SIGNIFICANT CHANGE UP (ref 0–1.5)
INR BLD: 1.1 — SIGNIFICANT CHANGE UP (ref 0.88–1.17)
LACTATE BLDA-SCNC: 1.6 MMOL/L — SIGNIFICANT CHANGE UP (ref 0.5–2)
LYMPHOCYTES # BLD AUTO: 2.92 K/UL — SIGNIFICANT CHANGE UP (ref 1–3.3)
LYMPHOCYTES # BLD AUTO: 26.1 % — SIGNIFICANT CHANGE UP (ref 13–44)
MAGNESIUM SERPL-MCNC: 2.1 MG/DL — SIGNIFICANT CHANGE UP (ref 1.6–2.6)
MCHC RBC-ENTMCNC: 31.5 % — LOW (ref 32–36)
MCHC RBC-ENTMCNC: 33.6 PG — SIGNIFICANT CHANGE UP (ref 27–34)
MCV RBC AUTO: 106.9 FL — HIGH (ref 80–100)
MONOCYTES # BLD AUTO: 1.54 K/UL — HIGH (ref 0–0.9)
MONOCYTES NFR BLD AUTO: 13.8 % — SIGNIFICANT CHANGE UP (ref 2–14)
NEUTROPHILS # BLD AUTO: 5.89 K/UL — SIGNIFICANT CHANGE UP (ref 1.8–7.4)
NEUTROPHILS NFR BLD AUTO: 52.5 % — SIGNIFICANT CHANGE UP (ref 43–77)
NRBC # FLD: 0.06 K/UL — SIGNIFICANT CHANGE UP (ref 0–0)
PCO2 BLDA: 43 MMHG — SIGNIFICANT CHANGE UP (ref 32–48)
PH BLDA: 7.4 PH — SIGNIFICANT CHANGE UP (ref 7.35–7.45)
PHOSPHATE SERPL-MCNC: 3 MG/DL — SIGNIFICANT CHANGE UP (ref 2.5–4.5)
PLATELET # BLD AUTO: 318 K/UL — SIGNIFICANT CHANGE UP (ref 150–400)
PMV BLD: 8.9 FL — SIGNIFICANT CHANGE UP (ref 7–13)
PO2 BLDA: 108 MMHG — SIGNIFICANT CHANGE UP (ref 83–108)
POTASSIUM BLDA-SCNC: 4.2 MMOL/L — SIGNIFICANT CHANGE UP (ref 3.4–4.5)
POTASSIUM SERPL-MCNC: 4.5 MMOL/L — SIGNIFICANT CHANGE UP (ref 3.5–5.3)
POTASSIUM SERPL-SCNC: 4.5 MMOL/L — SIGNIFICANT CHANGE UP (ref 3.5–5.3)
PROT SERPL-MCNC: 7.1 G/DL — SIGNIFICANT CHANGE UP (ref 6–8.3)
PROTHROM AB SERPL-ACNC: 12.2 SEC — SIGNIFICANT CHANGE UP (ref 9.8–13.1)
RBC # BLD: 3.33 M/UL — LOW (ref 3.8–5.2)
RBC # FLD: 16.6 % — HIGH (ref 10.3–14.5)
SAO2 % BLDA: 98.1 % — SIGNIFICANT CHANGE UP (ref 95–99)
SODIUM BLDA-SCNC: 139 MMOL/L — SIGNIFICANT CHANGE UP (ref 136–146)
SODIUM SERPL-SCNC: 142 MMOL/L — SIGNIFICANT CHANGE UP (ref 135–145)
VANCOMYCIN TROUGH SERPL-MCNC: 17.2 UG/ML — SIGNIFICANT CHANGE UP (ref 10–20)
WBC # BLD: 11.2 K/UL — HIGH (ref 3.8–10.5)
WBC # FLD AUTO: 11.2 K/UL — HIGH (ref 3.8–10.5)

## 2019-09-02 PROCEDURE — 36010 PLACE CATHETER IN VEIN: CPT

## 2019-09-02 PROCEDURE — 99291 CRITICAL CARE FIRST HOUR: CPT

## 2019-09-02 PROCEDURE — 71045 X-RAY EXAM CHEST 1 VIEW: CPT | Mod: 26

## 2019-09-02 PROCEDURE — 76937 US GUIDE VASCULAR ACCESS: CPT | Mod: 26,59

## 2019-09-02 PROCEDURE — 36600 WITHDRAWAL OF ARTERIAL BLOOD: CPT

## 2019-09-02 RX ORDER — FENTANYL CITRATE 50 UG/ML
100 INJECTION INTRAVENOUS ONCE
Refills: 0 | Status: DISCONTINUED | OUTPATIENT
Start: 2019-09-02 | End: 2019-09-02

## 2019-09-02 RX ORDER — ADENOSINE 3 MG/ML
6 INJECTION INTRAVENOUS ONCE
Refills: 0 | Status: COMPLETED | OUTPATIENT
Start: 2019-09-02 | End: 2019-09-01

## 2019-09-02 RX ORDER — PHENYLEPHRINE HYDROCHLORIDE 10 MG/ML
1 INJECTION INTRAVENOUS
Qty: 40 | Refills: 0 | Status: DISCONTINUED | OUTPATIENT
Start: 2019-09-02 | End: 2019-09-02

## 2019-09-02 RX ORDER — PHENYLEPHRINE HYDROCHLORIDE 10 MG/ML
1 INJECTION INTRAVENOUS
Qty: 160 | Refills: 0 | Status: DISCONTINUED | OUTPATIENT
Start: 2019-09-02 | End: 2019-09-05

## 2019-09-02 RX ORDER — SODIUM CHLORIDE 9 MG/ML
10 INJECTION INTRAMUSCULAR; INTRAVENOUS; SUBCUTANEOUS
Refills: 0 | Status: DISCONTINUED | OUTPATIENT
Start: 2019-09-02 | End: 2019-09-24

## 2019-09-02 RX ORDER — MIDODRINE HYDROCHLORIDE 2.5 MG/1
10 TABLET ORAL ONCE
Refills: 0 | Status: COMPLETED | OUTPATIENT
Start: 2019-09-02 | End: 2019-09-02

## 2019-09-02 RX ADMIN — CLOBAZAM 10 MILLIGRAM(S): 10 TABLET ORAL at 17:29

## 2019-09-02 RX ADMIN — OLANZAPINE 7.5 MILLIGRAM(S): 15 TABLET, FILM COATED ORAL at 22:42

## 2019-09-02 RX ADMIN — Medication 250 MILLIGRAM(S): at 06:16

## 2019-09-02 RX ADMIN — CHLORHEXIDINE GLUCONATE 15 MILLILITER(S): 213 SOLUTION TOPICAL at 17:25

## 2019-09-02 RX ADMIN — LEVETIRACETAM 400 MILLIGRAM(S): 250 TABLET, FILM COATED ORAL at 14:29

## 2019-09-02 RX ADMIN — MEROPENEM 100 MILLIGRAM(S): 1 INJECTION INTRAVENOUS at 11:24

## 2019-09-02 RX ADMIN — MEROPENEM 100 MILLIGRAM(S): 1 INJECTION INTRAVENOUS at 04:35

## 2019-09-02 RX ADMIN — Medication 1 APPLICATION(S): at 17:27

## 2019-09-02 RX ADMIN — Medication 250 MILLIGRAM(S): at 18:30

## 2019-09-02 RX ADMIN — ENOXAPARIN SODIUM 40 MILLIGRAM(S): 100 INJECTION SUBCUTANEOUS at 11:24

## 2019-09-02 RX ADMIN — PHENYLEPHRINE HYDROCHLORIDE 12.81 MICROGRAM(S)/KG/MIN: 10 INJECTION INTRAVENOUS at 06:14

## 2019-09-02 RX ADMIN — SODIUM CHLORIDE 4 MILLILITER(S): 9 INJECTION INTRAMUSCULAR; INTRAVENOUS; SUBCUTANEOUS at 09:12

## 2019-09-02 RX ADMIN — SODIUM CHLORIDE 4 MILLILITER(S): 9 INJECTION INTRAMUSCULAR; INTRAVENOUS; SUBCUTANEOUS at 03:12

## 2019-09-02 RX ADMIN — LEVETIRACETAM 400 MILLIGRAM(S): 250 TABLET, FILM COATED ORAL at 07:57

## 2019-09-02 RX ADMIN — MEROPENEM 100 MILLIGRAM(S): 1 INJECTION INTRAVENOUS at 20:48

## 2019-09-02 RX ADMIN — LACOSAMIDE 100 MILLIGRAM(S): 50 TABLET ORAL at 06:14

## 2019-09-02 RX ADMIN — PHENYLEPHRINE HYDROCHLORIDE 12.81 MICROGRAM(S)/KG/MIN: 10 INJECTION INTRAVENOUS at 20:45

## 2019-09-02 RX ADMIN — MIDODRINE HYDROCHLORIDE 10 MILLIGRAM(S): 2.5 TABLET ORAL at 14:33

## 2019-09-02 RX ADMIN — Medication 28.75 MILLIGRAM(S): at 20:30

## 2019-09-02 RX ADMIN — LEVETIRACETAM 400 MILLIGRAM(S): 250 TABLET, FILM COATED ORAL at 20:49

## 2019-09-02 RX ADMIN — SODIUM CHLORIDE 4 MILLILITER(S): 9 INJECTION INTRAMUSCULAR; INTRAVENOUS; SUBCUTANEOUS at 17:10

## 2019-09-02 RX ADMIN — CHLORHEXIDINE GLUCONATE 15 MILLILITER(S): 213 SOLUTION TOPICAL at 06:13

## 2019-09-02 RX ADMIN — FENTANYL CITRATE 100 MICROGRAM(S): 50 INJECTION INTRAVENOUS at 18:30

## 2019-09-02 RX ADMIN — LACOSAMIDE 100 MILLIGRAM(S): 50 TABLET ORAL at 14:28

## 2019-09-02 RX ADMIN — CLOBAZAM 10 MILLIGRAM(S): 10 TABLET ORAL at 06:16

## 2019-09-02 RX ADMIN — FENTANYL CITRATE 100 MICROGRAM(S): 50 INJECTION INTRAVENOUS at 18:15

## 2019-09-02 RX ADMIN — CHLORHEXIDINE GLUCONATE 1 APPLICATION(S): 213 SOLUTION TOPICAL at 06:14

## 2019-09-02 RX ADMIN — SODIUM CHLORIDE 4 MILLILITER(S): 9 INJECTION INTRAMUSCULAR; INTRAVENOUS; SUBCUTANEOUS at 23:34

## 2019-09-02 RX ADMIN — LACOSAMIDE 100 MILLIGRAM(S): 50 TABLET ORAL at 22:36

## 2019-09-02 RX ADMIN — PHENYLEPHRINE HYDROCHLORIDE 12.81 MICROGRAM(S)/KG/MIN: 10 INJECTION INTRAVENOUS at 07:57

## 2019-09-02 RX ADMIN — Medication 28.75 MILLIGRAM(S): at 06:15

## 2019-09-02 RX ADMIN — Medication 1 APPLICATION(S): at 06:16

## 2019-09-02 NOTE — EEG REPORT - NS EEG TEXT BOX
Study Date: 9/1/2019 Start 9:41 Duration 22:52  _____________________________________________________________  HISTORY    Patient is a 54y old  Female who presents with a chief complaint of Septic Shock (01 Sep 2019 10:17)  ________________________________________________________  STUDY INTERPRETATION    Findings: The record is unchanged from that of the prior 24h. The background was continuous, spontaneously variable and reactive. No posterior dominant rhythm seen.     Background Slowing:  Diffuse theta and polymorphic delta slowing.    Focal Slowing:   None were present.    Sleep Background:  Drowsiness was characterized by fragmentation, attenuation, and slowing of the background activity.    Stage II sleep transients was characterized by symmetrical sleep spindles.    Other Non-Epileptiform Findings:  None were present.    Interictal Epileptiform Activity:   None were present.    Events:  One push button activation at 02:10 for unclear reasons is associated with no changes in ongoing EEG activities. No seizures were recorded.     Activation Procedures:   Hyperventilation was not performed.    Photic stimulation was not performed.     Artifacts:  Intermittent myogenic and movement artifacts were noted.    ECG:  The heart rate on single channel ECG was predominantly between 60-70 BPM.    _____________________________________________________________  EEG SUMMARY/CLASSIFICATION      Abnormal EEG in a sedated patient.  - Moderate generalized slowing.    _____________________________________________________________  EEG IMPRESSION/CLINICAL CORRELATE    Abnormal EEG study. Unchanged from prior 24h   1. Mild to moderate nonspecific diffuse or multifocal cerebral dysfunction.   2. No epileptiform pattern or seizures seen.     _____________________________________________________________    Jenny Padilla MD  Epilepsy Fellow    Gonsalo Rojo MD  EEG Attending

## 2019-09-02 NOTE — PROCEDURE NOTE - ADDITIONAL PROCEDURE DETAILS
20G 10CM long power glide IV catheter was placed in Rt brachial vein /under US/
18G 10CM long power glide midline catheter placed under US in Rt brachial vein

## 2019-09-02 NOTE — PROCEDURE NOTE - NSINDICATIONS_GEN_A_CORE
Status epilepticus/airway protection/mental status change
critical illness/emergency venous access/hemodynamic monitoring
antibiotic therapy/emergency venous access/fluid administration/other
other

## 2019-09-02 NOTE — CHART NOTE - NSCHARTNOTEFT_GEN_A_CORE
Critically ill patient requiring ABG to determine respiratory status.  This was an emergent procedure.  Patients radial artery was palpated/ visualized with US and cleaned with alcohol pad.   23g x 3/4" x 12" butterfly needed was inserted into the left/right radial artery with pulsatile flash.  One attempt was performed.  3cc of blood was obtained from patient.  Gauze pad was placed over site, needle withdrawn and firm pressure was held until complete hemostasis was achieved and band-aid was applied.  Patient tolerated procedure well with no complications.        [facility] RPA [id number]

## 2019-09-02 NOTE — PROGRESS NOTE ADULT - ATTENDING COMMENTS
hypotensive/shock secondary to sepsis.pna. f/u with neuro. off ativan standing. mental status improved today. d/w family status/GOC

## 2019-09-02 NOTE — PROCEDURE NOTE - NSPROCDETAILS_GEN_ALL_CORE
location identified, draped/prepped, sterile technique used/blood seen on insertion/dressing applied/flushes easily/secured in place/sterile technique, catheter placed/ultrasound utilization
guidewire recovered/lumen(s) aspirated and flushed/sterile dressing applied/sterile technique, catheter placed/ultrasound guidance
location identified, draped/prepped, sterile technique used/blood seen on insertion/dressing applied/flushes easily/secured in place/sterile technique, catheter placed/ultrasound utilization

## 2019-09-02 NOTE — PROGRESS NOTE ADULT - ASSESSMENT
Ms. Ponce is a 54F with a PMH of cerebral palsy and seizure disorder presenting from group home for acute AMS and increased work of breathing with recent seizure found to be hypothermic, hypotensive and bradycardic with leukocytosis and imaging showing new right lung opacification concerning for septic shock 2/2 to aspiration PNA requiring pressors and airway support, intermittently requiring pressors, seizures controlled with 5 AEDs.     NEURO: Patient has a history of cerebral palsy and seizure disorder. Baseline mental status minimally verbal but interactive/alert and withdraws to pain. Neurology on board. Lethargic currently 2/2 to metabolic encephalopathy due to infection.  -Intubated due to poor mental status s/p seizures concern for possible status epilepticus and hypercarbic respiratory failure  -Potential epileptogenic focus in the right fronto-temporal region shown on VEEG  -Spoke with Neuro in regards to pt's AEDs: valproate 750mg TID, levetiracetam 1000mg TID. Load lacosamide 200 mg IV, [id number] for maintenance. Now off lorazepam 2mg q6.   -Neuro recs appreciated.  -Restarted patient on home olanzapine - can lower seizure threshold, will consider d/cing.   -VPA level subtherapeutic - f/u with neuro rec regarding VPA dosing.   -f/u clobazam and lacosamide level    CARDIOVASCULAR: No history of cardiac disease. However found to be hypotensive and bradycardic in the setting of sepsis. Pressor support improved SBPs and bradycardia.  -phenylephrine gtt titrating for goal MAP > 60     PULMONARY: Hypothermic with lung imaging concerning for new right lung opacification. On POCUS see signs of consolidation likely aspiration PNA   - S/p intubation in MICU for poor mental status and inability to maintain airway, as well as hypercarbic respiratory failure pCO2s 50s to 70s  -When plan to extubate will need steroids to minimize vocal cord edema  -Aspiration precautions  -Sputum Cx no growth  -on rpt CPAP, patient has low tidal volume, right shift cardiac frame, suspect atelectasis of LLB, may attempt bronchoscopy to clear mucus plug if present    GI: Patient has a history of enteritis in the past but no GI symptoms currently. Eats pureed foods.  - Nutrition recs appreciated, OGT feeding initiated  - Aspiration precautions     RENAL:   - Purewick catheter in place with clear urine output  - Replete electrolytes PRN    INFECTIOUS DISEASE:   - On admission, right lung opacification with hypothermia and leukocytosis meeting SIRS criteria with presumable source aspiration PNA complicated by hypotension 2/2 to septic shock. UA negative.   - If patient remains on high doses of pressor support will consider CT chest/abdomen/pelvis to rule out occult source of infection given also has a recent history of enteritis requiring MICU admission  -Follow up blood cultures  -s/p zosyn for aspiration pneumonia with last day 8/23  -Legionella negative  -CBCs with diff daily  -Sputum cultures showed no growth for 24h  -Pt now with increasing pressure requirement, febrile, may be in setting of new infection with sepsis - likely PNA, bedside POCUS with LLL lung consolidation  -F/u bcx, sputum cx, sputum acid fast  -on meropenem and vanc (8/31) for likely PNA      HEMATOLOGY: has thrombocytopenia but has had thrombocytopenia in the past could be 2/2 to infection  -Worsening thrombocytopenia, d/c'd heparin for now, possible cause Depakote went down on patient's Depakote dose, will trend platelets   - Platelets stable  - Macrocytic anemia, B12, folate - elevated    ENDOCRINE:  - No active issues    SKIN:  - Few blanching pressure wounds on back of feet and sacral area     DVT PPx:  - enoxaparin 40 mg subQ qd    Code Status: Full

## 2019-09-02 NOTE — PROGRESS NOTE ADULT - SUBJECTIVE AND OBJECTIVE BOX
Hola Draper MD, PGY-1  Pager #[phone]  Spectra #[phone]  ---------------------------------------------  INTERVAL HPI/OVERNIGHT EVENTS: Pt had multiple runs of SVTs yesterday overnight. Norepinephrine stopped, started on phenylephrine. Vanc trough 17, vancomycin redosed.     SUBJECTIVE: Patient seen and examined at bedside. AOx0, intubated, eyes opening and closing in response to pain and light.     OBJECTIVE:  ICU Vital Signs Last 24 Hrs  T(F): 97.2 (02 Sep 2019 04:00), Max: 97.2 (02 Sep 2019 04:00)  HR: 63 (02 Sep 2019 07:00) (62 - 168)  BP: 122/59 (02 Sep 2019 07:00) (72/48 - 160/84)  BP(mean): 77 (02 Sep 2019 07:00) (55 - 109)  RR: 12 (02 Sep 2019 07:00) (12 - 26)  SpO2: 99% (02 Sep 2019 07:00) (92% - 100%)    Mode: AC/ CMV (Assist Control/ Continuous Mandatory Ventilation)  RR (machine): 12  TV (machine): 360  FiO2: 30  PEEP: 5  MAP: 8  PIP: 21    Physical Exam:   General: obtunded, intubated.  HEENT: NC/AT  Neck: supple  Respiratory: CTA b/l  Cardiovascular: +S1/S2; tachycardic  Abdomen: soft, NT/ND; +BS   Extremities: WWP, 2+ peripheral pulses b/l; no LE edema  Skin: normal color and turgor; no rash    I&O's Summary    01 Sep 2019 07:01  -  02 Sep 2019 07:00  --------------------------------------------------------  IN: 2518 mL / OUT: 3070 mL / NET: -552 mL      MEDICATIONS  (STANDING):  chlorhexidine 0.12% Liquid 15 milliLiter(s) Oral Mucosa every 12 hours  chlorhexidine 4% Liquid 1 Application(s) Topical <User Schedule>  Clobazam Suspension 10 milliGRAM(s) Oral two times a day  enoxaparin Injectable 40 milliGRAM(s) SubCutaneous daily  lacosamide Solution 100 milliGRAM(s) Oral <User Schedule>  levETIRAcetam  IVPB 1000 milliGRAM(s) IV Intermittent <User Schedule>  meropenem  IVPB 1000 milliGRAM(s) IV Intermittent every 8 hours  meropenem  IVPB      OLANZapine 7.5 milliGRAM(s) Oral at bedtime  petrolatum Ophthalmic Ointment 1 Application(s) Both EYES two times a day  phenylephrine    Infusion 1 MICROgram(s)/kG/Min (12.806 mL/Hr) IV Continuous <Continuous>  sodium chloride 3%  Inhalation 4 milliLiter(s) Inhalation every 6 hours  valproate sodium IVPB 750 milliGRAM(s) IV Intermittent every 8 hours  vancomycin  IVPB      vancomycin  IVPB 1000 milliGRAM(s) IV Intermittent every 12 hours    MEDICATIONS  (PRN):    Allergies    Topamax (Unknown)    Intolerances        LABS:                        11.2   11.20 )-----------( 318      ( 02 Sep 2019 00:10 )             35.6     09-02    142  |  109<H>  |  11  ----------------------------<  129<H>  4.5   |  24  |  0.47<L>    Ca    8.6      02 Sep 2019 00:10  Phos  3.0     09-02  Mg     2.1     09-02    TPro  7.1  /  Alb  2.9<L>  /  TBili  0.2  /  DBili  x   /  AST  26  /  ALT  15  /  AlkPhos  78  09-02    PT/INR - ( 02 Sep 2019 00:10 )   PT: 12.2 SEC;   INR: 1.10          PTT - ( 02 Sep 2019 00:10 )  PTT:32.1 SEC  ABG - ( 02 Sep 2019 00:10 )  pH, Arterial: 7.40  pH, Blood: x     /  pCO2: 43    /  pO2: 108   / HCO3: 26    / Base Excess: 1.8   /  SaO2: 98.1      Lactate Trend  09-02 @ 00:10 Lactate:1.6     folate >20  b12 1540    levetiracetam level: 62.3  ammonia: 49  Valproic Acid Level, Serum: 37.2 ug/mL (09.01.19 @ 05:20)    Vancomycin Level, Trough: 17.2 (09.02.19 @ 00:10)    BCx 8/31 NGTD    RADIOLOGY & ADDITIONAL TESTS: no new imaging.

## 2019-09-02 NOTE — PROGRESS NOTE ADULT - SUBJECTIVE AND OBJECTIVE BOX
Ms. Ponce is a 54F with a PMH of cerebral palsy and seizure disorder presenting from group home for AMS and increased work of breathing. Patient is minimally verbal at baseline so unable to give history. Obtained history from aide at bedside and chart review. She reports that since yesterday patient has had decreased alertness and "breathing has been different". At baseline patient is interactive and may say a few words. She can also withdraw from her pain and eats pureed foods. Reportedly had a seizure episode yesterday and since then has not been herself. No fevers/chills, cough, nausea/vomiting or diarrhea.    Of note patient has had multiple hospitalizations in the past year recently between  to . At that time presented due to multiple seizures concerning for status epilepticus. Patient was hypotensive, hypothermic and bradycardic in the setting of septic shock and admitted to MICU for pressor support. Her infectious work up was positive for enteritis on CT A/P and was given Cipro/Flagyl for 7 days and then weaned off pressors and transitioned to the floors. During that hospitalization her AEDs were optimized with an increase in depakote to 750mg TID (1 pill of 250 and 1 pill of 500) and ativan was discontinued. Has had multiple UTIs in the past 2/2 to E coli (pansensitive).    In the ED 96F, /66 HR 98 RR 18 SpO2 of 98% on RA. Labs notable for leukocytosis 18 (neutrophilic predominant), thrombocytopenia to 112, pH 7.26 pCO2 75 pO2 31 with a lactate of 5.9 (repeat 3.7). Neurology consulted. CXR shows right lung opacity concerning for PNA. Had 5-6 seizures that were GTC that lasted ~30 seconds. Received levaquin x 1, 3L of NS, Solucortef 100mg x 1, Keppra 1500mg x 1, Ativan 1mg and 2mg x 1 each. Patient continued to be hypothermic, bradycardic to 50s and hypotensive to the 60s and started on levophed gtt. Transferred to MICU for further management. (18 Aug 2019 21:45)      Past Medical History  Intellectual disability  Constipation  Seizure disorder  Cerebral palsy      Past Surgical History  No significant past surgical history      MEDICATIONS    chlorhexidine 0.12% Liquid 15 milliLiter(s) Oral Mucosa every 12 hours  chlorhexidine 4% Liquid 1 Application(s) Topical <User Schedule>  Clobazam Suspension 10 milliGRAM(s) Oral two times a day  enoxaparin Injectable 40 milliGRAM(s) SubCutaneous daily  lacosamide Solution 100 milliGRAM(s) Oral <User Schedule>  levETIRAcetam  IVPB 1000 milliGRAM(s) IV Intermittent <User Schedule>  LORazepam   Injectable 2 milliGRAM(s) IV Push every 6 hours  norepinephrine Infusion 0.05 MICROgram(s)/kG/Min IV Continuous <Continuous>  OLANZapine 7.5 milliGRAM(s) Oral at bedtime  petrolatum Ophthalmic Ointment 1 Application(s) Both EYES two times a day  sodium chloride 0.9% Bolus 500 milliLiter(s) IV Bolus once  sodium chloride 3%  Inhalation 4 milliLiter(s) Inhalation every 6 hours  valproate sodium IVPB 750 milliGRAM(s) IV Intermittent every 8 hours         Family history: No history of dementia, strokes, or seizures   FAMILY HISTORY:  No pertinent family history in first degree relatives    SOCIAL HISTORY -- No history of tobacco or alcohol use     Allergies    Topamax (Unknown)    Intolerances            Vital Signs Last 24 Hrs  T(C): 38.1 (31 Aug 2019 04:00), Max: 38.1 (31 Aug 2019 04:00)  T(F): 100.6 (31 Aug 2019 04:00), Max: 100.6 (31 Aug 2019 04:00)  HR: 100 (31 Aug 2019 07:57) (59 - 100)  BP: 105/59 (31 Aug 2019 07:00) (71/29 - 151/68)  BP(mean): 74 (31 Aug 2019 07:00) (42 - 92)  RR: 12 (31 Aug 2019 07:00) (12 - 24)  SpO2: 92% (31 Aug 2019 07:57) (91% - 98%)        On Neurological Examination:    Mental Status - Patient is lethargic intubated and sedated  Follow no simple commands .  Speech - non verbal                              Cranial Nerves -  Facial symmetry Tongue midline,    Motor Exam - spastic quad.      Sensory    no withdrawal    GENERAL Exam:     Nontoxic , No Acute Distress   	  HEENT:  normocephalic, atraumatic  		  LUNGS:	Clear bilaterally  No Wheeze      VASCULAR: no carotid brui  	  HEART:	 Normal S1S2   No murmur RRR        	  MUSCULOSKELETAL: Normal Range of Motion  	   SKIN:      Normal   No Ecchymosis               LABS:  CBC Full  -  ( 31 Aug 2019 04:00 )  WBC Count : 10.77 K/uL  RBC Count : 3.70 M/uL  Hemoglobin : 12.2 g/dL  Hematocrit : 39.6 %  Platelet Count - Automated : 276 K/uL  Mean Cell Volume : 107.0 fL  Mean Cell Hemoglobin : 33.0 pg  Mean Cell Hemoglobin Concentration : 30.8 %  Auto Neutrophil # : x  Auto Lymphocyte # : x  Auto Monocyte # : x  Auto Eosinophil # : x  Auto Basophil # : x  Auto Neutrophil % : x  Auto Lymphocyte % : x  Auto Monocyte % : x  Auto Eosinophil % : x  Auto Basophil % : x          144  |  107  |  12  ----------------------------<  101<H>  4.3   |  24  |  0.52    Ca    9.1      31 Aug 2019 04:00  Phos  4.8       Mg     2.0         TPro  7.3  /  Alb  3.2<L>  /  TBili  0.3  /  DBili  x   /  AST  28  /  ALT  13  /  AlkPhos  71      Hemoglobin A1C:     LIVER FUNCTIONS - ( 31 Aug 2019 04:00 )  Alb: 3.2 g/dL / Pro: 7.3 g/dL / ALK PHOS: 71 u/L / ALT: 13 u/L / AST: 28 u/L / GGT: x           Vitamin B12 Vitamin B12, Serum: 1540 pg/mL ( @ 04:00)          RADIOLOGY    EEG Report:  · EEG Report		  [facility] EPILEPSY [place]   REPORT OF CONTINUOUS VIDEO EEG     [facility]: [address] [place] [address] , [place] [address], Ph#: [phone]  [facility]: [address] [address] [place] [address], Ph#: [phone]  Office: [address] [place] [address] Ph#: [phone]    Patient Name: NAYANA PONCE  Age and : 54y ([birth year])  MRN #: [id number]  Location: [place] [address]  Referring Physician: Roberth Renner    Study Date: 19 10:54 - 19 09:05  _____________________________________________________________  STUDY INFORMATION    EEG Recording Technique:  The patient underwent continuous Video-EEG monitoring, using Telemetry System hardware on the XLTek Digital System. EEG and video data were stored on a computer hard drive with important events saved in digital archive files. The material was reviewed by a physician (electroencephalographer / epileptologist) on a daily basis. Valerio and seizure detection algorithms were utilized and reviewed. An EEG Technician attended to the patient, and was available throughout daytime work hours.  The epilepsy center neurologist was available in person or on call 24-hours per day.    EEG Placement and Labeling of Electrodes:  The EEG was performed utilizing 20 channel referential EEG connections (coronal over temporal over parasagittal montage) using all standard 10-20 electrode placements with EKG, with additional electrodes placed in the inferior temporal region using the modified 10-10 montage electrode placements for elective admissions, or if deemed necessary. Recording was at a sampling rate of 256 samples per second per channel. Time synchronized digital video recording was done simultaneously with EEG recording. A low light infrared camera was used for low light recording.     _____________________________________________________________  HISTORY    Patient is a 54y old  Female who presents with a chief complaint of Septic Shock (01 Sep 2019 10:17)      PERTINENT MEDICATION:  MEDICATIONS  (STANDING):  Clobazam Suspension 10 milliGRAM(s) Oral two times a day  lacosamide Solution 100 milliGRAM(s) Oral <User Schedule>  levETIRAcetam  IVPB 1000 milliGRAM(s) IV Intermittent <User Schedule>  ________________________________________________________  STUDY INTERPRETATION    Findings: The record is unchanged from that of the prior 24h. The background was continuous, spontaneously variable and reactive. No posterior dominant rhythm seen.     Background Slowing:  Diffuse theta and polymorphic delta slowing.    Focal Slowing:   None were present.    Sleep Background:  Drowsiness was characterized by fragmentation, attenuation, and slowing of the background activity.    Stage II sleep transients was characterized by symmetrical sleep spindles.    Other Non-Epileptiform Findings:  None were present.    Interictal Epileptiform Activity:   None were present.    Events:  One push button activation at 02:10 for unclear reasons is associated with no changes in ongoing EEG activities. No seizures were recorded.     Activation Procedures:   Hyperventilation was not performed.    Photic stimulation was not performed.     Artifacts:  Intermittent myogenic and movement artifacts were noted.    ECG:  The heart rate on single channel ECG was predominantly between 60-70 BPM.    _____________________________________________________________  EEG SUMMARY/CLASSIFICATION      Abnormal EEG in a sedated patient.  - Moderate generalized slowing.    _____________________________________________________________  EEG IMPRESSION/CLINICAL CORRELATE    Abnormal EEG study.  1. Mild to moderate nonspecific diffuse or multifocal cerebral dysfunction.   2. No epileptiform pattern or seizures seen.     _____________________________________________________________    Jenny Padilla MD  Epilepsy Fellow    Gonsalo Rojo MD  EEG Attending      Electronic Signatures:  Gonsalo Rojo)  (Signed 01-Sep-2019 12:41)  	Authored: EEG REPORT  	Co-Signer: EEG REPORT  Jenny Padilla)  (Signed 01-Sep-2019 12:14)  	Authored: EEG REPORT      Last Updated: 01-Sep-20          schoenberg     Assessment and Plan:   · Assessment		  Ms. Ponce is a 54F with a PMH of cerebral palsy and seizure disorder .  patient presented with multiple seizures     EEG shows no active seizures at present     follow up EEG     on keppra ,Vimpat, valproic and onfi     appreciate ICU input monitoring    patients baseline is poor she only opens her eyes    try to extubate since seizures are stable for days     spoke to family    Electronic Signatures:  Schoenberg, Laura Gray

## 2019-09-03 LAB
ALBUMIN SERPL ELPH-MCNC: 2.8 G/DL — LOW (ref 3.3–5)
ALP SERPL-CCNC: 132 U/L — HIGH (ref 40–120)
ALT FLD-CCNC: 32 U/L — SIGNIFICANT CHANGE UP (ref 4–33)
ANION GAP SERPL CALC-SCNC: 11 MMO/L — SIGNIFICANT CHANGE UP (ref 7–14)
AST SERPL-CCNC: 59 U/L — HIGH (ref 4–32)
BASOPHILS # BLD AUTO: 0.05 K/UL — SIGNIFICANT CHANGE UP (ref 0–0.2)
BASOPHILS NFR BLD AUTO: 0.5 % — SIGNIFICANT CHANGE UP (ref 0–2)
BILIRUB SERPL-MCNC: 0.3 MG/DL — SIGNIFICANT CHANGE UP (ref 0.2–1.2)
BUN SERPL-MCNC: 12 MG/DL — SIGNIFICANT CHANGE UP (ref 7–23)
CALCIUM SERPL-MCNC: 8.7 MG/DL — SIGNIFICANT CHANGE UP (ref 8.4–10.5)
CHLORIDE SERPL-SCNC: 105 MMOL/L — SIGNIFICANT CHANGE UP (ref 98–107)
CO2 SERPL-SCNC: 27 MMOL/L — SIGNIFICANT CHANGE UP (ref 22–31)
CREAT SERPL-MCNC: 0.44 MG/DL — LOW (ref 0.5–1.3)
EOSINOPHIL # BLD AUTO: 0.54 K/UL — HIGH (ref 0–0.5)
EOSINOPHIL NFR BLD AUTO: 5.8 % — SIGNIFICANT CHANGE UP (ref 0–6)
GLUCOSE SERPL-MCNC: 110 MG/DL — HIGH (ref 70–99)
HCT VFR BLD CALC: 33.3 % — LOW (ref 34.5–45)
HGB BLD-MCNC: 10.3 G/DL — LOW (ref 11.5–15.5)
IMM GRANULOCYTES NFR BLD AUTO: 1.4 % — SIGNIFICANT CHANGE UP (ref 0–1.5)
LEVETIRACETAM SERPL-MCNC: 39.7 MCG/ML — SIGNIFICANT CHANGE UP (ref 12–46)
LYMPHOCYTES # BLD AUTO: 3.59 K/UL — HIGH (ref 1–3.3)
LYMPHOCYTES # BLD AUTO: 38.3 % — SIGNIFICANT CHANGE UP (ref 13–44)
MAGNESIUM SERPL-MCNC: 1.9 MG/DL — SIGNIFICANT CHANGE UP (ref 1.6–2.6)
MCHC RBC-ENTMCNC: 30.9 % — LOW (ref 32–36)
MCHC RBC-ENTMCNC: 33 PG — SIGNIFICANT CHANGE UP (ref 27–34)
MCV RBC AUTO: 106.7 FL — HIGH (ref 80–100)
MONOCYTES # BLD AUTO: 1.29 K/UL — HIGH (ref 0–0.9)
MONOCYTES NFR BLD AUTO: 13.8 % — SIGNIFICANT CHANGE UP (ref 2–14)
NEUTROPHILS # BLD AUTO: 3.77 K/UL — SIGNIFICANT CHANGE UP (ref 1.8–7.4)
NEUTROPHILS NFR BLD AUTO: 40.2 % — LOW (ref 43–77)
NRBC # FLD: 0.02 K/UL — SIGNIFICANT CHANGE UP (ref 0–0)
PHOSPHATE SERPL-MCNC: 3.9 MG/DL — SIGNIFICANT CHANGE UP (ref 2.5–4.5)
PLATELET # BLD AUTO: 316 K/UL — SIGNIFICANT CHANGE UP (ref 150–400)
PMV BLD: 9 FL — SIGNIFICANT CHANGE UP (ref 7–13)
POTASSIUM SERPL-MCNC: 4 MMOL/L — SIGNIFICANT CHANGE UP (ref 3.5–5.3)
POTASSIUM SERPL-SCNC: 4 MMOL/L — SIGNIFICANT CHANGE UP (ref 3.5–5.3)
PROT SERPL-MCNC: 6.8 G/DL — SIGNIFICANT CHANGE UP (ref 6–8.3)
RBC # BLD: 3.12 M/UL — LOW (ref 3.8–5.2)
RBC # FLD: 16.7 % — HIGH (ref 10.3–14.5)
SODIUM SERPL-SCNC: 143 MMOL/L — SIGNIFICANT CHANGE UP (ref 135–145)
WBC # BLD: 9.37 K/UL — SIGNIFICANT CHANGE UP (ref 3.8–10.5)
WBC # FLD AUTO: 9.37 K/UL — SIGNIFICANT CHANGE UP (ref 3.8–10.5)

## 2019-09-03 PROCEDURE — 99291 CRITICAL CARE FIRST HOUR: CPT

## 2019-09-03 PROCEDURE — 71045 X-RAY EXAM CHEST 1 VIEW: CPT | Mod: 26

## 2019-09-03 PROCEDURE — 99233 SBSQ HOSP IP/OBS HIGH 50: CPT | Mod: GC

## 2019-09-03 RX ORDER — PIPERACILLIN AND TAZOBACTAM 4; .5 G/20ML; G/20ML
3.38 INJECTION, POWDER, LYOPHILIZED, FOR SOLUTION INTRAVENOUS EVERY 8 HOURS
Refills: 0 | Status: DISCONTINUED | OUTPATIENT
Start: 2019-09-03 | End: 2019-09-05

## 2019-09-03 RX ORDER — FUROSEMIDE 40 MG
20 TABLET ORAL ONCE
Refills: 0 | Status: COMPLETED | OUTPATIENT
Start: 2019-09-03 | End: 2019-09-03

## 2019-09-03 RX ADMIN — Medication 250 MILLIGRAM(S): at 05:07

## 2019-09-03 RX ADMIN — PIPERACILLIN AND TAZOBACTAM 25 GRAM(S): 4; .5 INJECTION, POWDER, LYOPHILIZED, FOR SOLUTION INTRAVENOUS at 14:47

## 2019-09-03 RX ADMIN — LACOSAMIDE 100 MILLIGRAM(S): 50 TABLET ORAL at 14:45

## 2019-09-03 RX ADMIN — PHENYLEPHRINE HYDROCHLORIDE 12.81 MICROGRAM(S)/KG/MIN: 10 INJECTION INTRAVENOUS at 21:56

## 2019-09-03 RX ADMIN — Medication 28.75 MILLIGRAM(S): at 10:43

## 2019-09-03 RX ADMIN — LACOSAMIDE 100 MILLIGRAM(S): 50 TABLET ORAL at 05:19

## 2019-09-03 RX ADMIN — PIPERACILLIN AND TAZOBACTAM 25 GRAM(S): 4; .5 INJECTION, POWDER, LYOPHILIZED, FOR SOLUTION INTRAVENOUS at 22:16

## 2019-09-03 RX ADMIN — PHENYLEPHRINE HYDROCHLORIDE 12.81 MICROGRAM(S)/KG/MIN: 10 INJECTION INTRAVENOUS at 08:44

## 2019-09-03 RX ADMIN — LEVETIRACETAM 400 MILLIGRAM(S): 250 TABLET, FILM COATED ORAL at 14:45

## 2019-09-03 RX ADMIN — LEVETIRACETAM 400 MILLIGRAM(S): 250 TABLET, FILM COATED ORAL at 08:44

## 2019-09-03 RX ADMIN — Medication 1 APPLICATION(S): at 05:19

## 2019-09-03 RX ADMIN — Medication 28.75 MILLIGRAM(S): at 21:42

## 2019-09-03 RX ADMIN — Medication 250 MILLIGRAM(S): at 19:21

## 2019-09-03 RX ADMIN — CHLORHEXIDINE GLUCONATE 15 MILLILITER(S): 213 SOLUTION TOPICAL at 05:13

## 2019-09-03 RX ADMIN — SODIUM CHLORIDE 4 MILLILITER(S): 9 INJECTION INTRAMUSCULAR; INTRAVENOUS; SUBCUTANEOUS at 10:02

## 2019-09-03 RX ADMIN — ENOXAPARIN SODIUM 40 MILLIGRAM(S): 100 INJECTION SUBCUTANEOUS at 12:39

## 2019-09-03 RX ADMIN — Medication 28.75 MILLIGRAM(S): at 02:13

## 2019-09-03 RX ADMIN — CLOBAZAM 10 MILLIGRAM(S): 10 TABLET ORAL at 06:43

## 2019-09-03 RX ADMIN — LEVETIRACETAM 400 MILLIGRAM(S): 250 TABLET, FILM COATED ORAL at 21:48

## 2019-09-03 RX ADMIN — MEROPENEM 100 MILLIGRAM(S): 1 INJECTION INTRAVENOUS at 04:22

## 2019-09-03 RX ADMIN — CHLORHEXIDINE GLUCONATE 1 APPLICATION(S): 213 SOLUTION TOPICAL at 06:39

## 2019-09-03 RX ADMIN — SODIUM CHLORIDE 4 MILLILITER(S): 9 INJECTION INTRAMUSCULAR; INTRAVENOUS; SUBCUTANEOUS at 04:42

## 2019-09-03 RX ADMIN — Medication 20 MILLIGRAM(S): at 12:39

## 2019-09-03 NOTE — PROGRESS NOTE ADULT - SUBJECTIVE AND OBJECTIVE BOX
Hola Draper MD, PGY-1  Pager #[phone]  Spectra #[phone]  ---------------------------------------------  INTERVAL HPI/OVERNIGHT EVENTS: No overnight events.      SUBJECTIVE: Patient seen and examined at bedside. Patient alert and awake, tracking with eyes, spontaneously moving RUE.     OBJECTIVE:  ICU Vital Signs Last 24 Hrs  T(F): 98.8 (03 Sep 2019 12:00), Max: 98.8 (03 Sep 2019 00:00)  HR: 78 (03 Sep 2019 14:00) (52 - 89)  BP: 102/63 (03 Sep 2019 14:00) (77/49 - 141/71)  BP(mean): 69 (03 Sep 2019 14:00) (59 - 91)  RR: 18 (03 Sep 2019 14:00) (6 - 18)  SpO2: 100% (03 Sep 2019 14:00) (96% - 100%)    Mode: AC/ CMV (Assist Control/ Continuous Mandatory Ventilation)  RR (machine): 12  TV (machine): 360  FiO2: 30  PEEP: 5  MAP: 9  PIP: 25    Physical Exam:   General: obtunded, intubated.  HEENT: NC/AT  Neck: supple  Respiratory: CTA b/l  Cardiovascular: +S1/S2; tachycardic  Abdomen: soft, NT/ND; +BS   Extremities: WWP, 2+ peripheral pulses b/l; no LE edema  Skin: normal color and turgor; no rash    I&O's Summary    02 Sep 2019 07:01  -  03 Sep 2019 07:00  --------------------------------------------------------  IN: 3002.6 mL / OUT: 2150 mL / NET: 852.6 mL    03 Sep 2019 07:01  -  03 Sep 2019 14:24  --------------------------------------------------------  IN: 136 mL / OUT: 700 mL / NET: -564 mL    MEDICATIONS  (STANDING):  chlorhexidine 0.12% Liquid 15 milliLiter(s) Oral Mucosa every 12 hours  chlorhexidine 4% Liquid 1 Application(s) Topical <User Schedule>  Clobazam Suspension 10 milliGRAM(s) Oral two times a day  enoxaparin Injectable 40 milliGRAM(s) SubCutaneous daily  lacosamide Solution 100 milliGRAM(s) Oral <User Schedule>  levETIRAcetam  IVPB 1000 milliGRAM(s) IV Intermittent <User Schedule>  OLANZapine 7.5 milliGRAM(s) Oral at bedtime  petrolatum Ophthalmic Ointment 1 Application(s) Both EYES two times a day  phenylephrine    Infusion 1 MICROgram(s)/kG/Min (12.806 mL/Hr) IV Continuous <Continuous>  piperacillin/tazobactam IVPB.. 3.375 Gram(s) IV Intermittent every 8 hours  sodium chloride 3%  Inhalation 4 milliLiter(s) Inhalation every 6 hours  valproate sodium IVPB 750 milliGRAM(s) IV Intermittent every 8 hours  vancomycin  IVPB      vancomycin  IVPB 1000 milliGRAM(s) IV Intermittent every 12 hours    MEDICATIONS  (PRN):  sodium chloride 0.9% lock flush 10 milliLiter(s) IV Push every 1 hour PRN Pre/post blood products, medications, blood draw, and to maintain line patency    Allergies    Topamax (Unknown)    Intolerances    LABS:                       10.3   9.37  )-----------( 316      ( 03 Sep 2019 04:20 )             33.3     09-03    143  |  105  |  12  ----------------------------<  110<H>  4.0   |  27  |  0.44<L>    Ca    8.7      03 Sep 2019 04:20  Phos  3.9     09-03  Mg     1.9     09-03    TPro  6.8  /  Alb  2.8<L>  /  TBili  0.3  /  DBili  x   /  AST  59<H>  /  ALT  32  /  AlkPhos  132<H>  09-03    PT/INR - ( 02 Sep 2019 00:10 )   PT: 12.2 SEC;   INR: 1.10          PTT - ( 02 Sep 2019 00:10 )  PTT:32.1 SEC  ABG - ( 02 Sep 2019 00:10 )  pH, Arterial: 7.40  pH, Blood: x     /  pCO2: 43    /  pO2: 108   / HCO3: 26    / Base Excess: 1.8   /  SaO2: 98.1      Lactate Trend  09-02 @ 00:10 Lactate:1.6     CAPILLARY BLOOD GLUCOSE    folate >20  b12 1540  levetiracetam level: 62.3  ammonia: 49  Valproic Acid Level, Serum: 37.2 ug/mL (09.01.19 @ 05:20)  Vancomycin Level, Trough: 17.2 (09.02.19 @ 00:10)  BCx 8/31 NGTD    RADIOLOGY & ADDITIONAL TESTS: no new imaging. Hola Draper MD, PGY-1  Pager #[phone]  Spectra #[phone]  ---------------------------------------------  INTERVAL HPI/OVERNIGHT EVENTS: No overnight events.      SUBJECTIVE: Patient seen and examined at bedside. Patient alert and awake, tracking with eyes, spontaneously moving RUE.     OBJECTIVE:  ICU Vital Signs Last 24 Hrs  T(F): 98.8 (03 Sep 2019 12:00), Max: 98.8 (03 Sep 2019 00:00)  HR: 78 (03 Sep 2019 14:00) (52 - 89)  BP: 102/63 (03 Sep 2019 14:00) (77/49 - 141/71)  BP(mean): 69 (03 Sep 2019 14:00) (59 - 91)  RR: 18 (03 Sep 2019 14:00) (6 - 18)  SpO2: 100% (03 Sep 2019 14:00) (96% - 100%)    Mode: AC/ CMV (Assist Control/ Continuous Mandatory Ventilation)  RR (machine): 12  TV (machine): 360  FiO2: 30  PEEP: 5  MAP: 9  PIP: 25    Physical Exam:   General: alert, female, intubated, in NAD  HEENT: NC/AT  Neck: supple  Respiratory: CTA b/l  Cardiovascular: +S1/S2; tachycardic  Abdomen: soft, NT/ND; +BS   Extremities: WWP, 2+ peripheral pulses b/l; no LE edema  Skin: normal color and turgor; no rash  Neuro: alert, noncommunicative, moving UE spontaneously, turning head to stimuli     I&O's Summary    02 Sep 2019 07:01  -  03 Sep 2019 07:00  --------------------------------------------------------  IN: 3002.6 mL / OUT: 2150 mL / NET: 852.6 mL    03 Sep 2019 07:01  -  03 Sep 2019 14:24  --------------------------------------------------------  IN: 136 mL / OUT: 700 mL / NET: -564 mL    MEDICATIONS  (STANDING):  chlorhexidine 0.12% Liquid 15 milliLiter(s) Oral Mucosa every 12 hours  chlorhexidine 4% Liquid 1 Application(s) Topical <User Schedule>  Clobazam Suspension 10 milliGRAM(s) Oral two times a day  enoxaparin Injectable 40 milliGRAM(s) SubCutaneous daily  lacosamide Solution 100 milliGRAM(s) Oral <User Schedule>  levETIRAcetam  IVPB 1000 milliGRAM(s) IV Intermittent <User Schedule>  OLANZapine 7.5 milliGRAM(s) Oral at bedtime  petrolatum Ophthalmic Ointment 1 Application(s) Both EYES two times a day  phenylephrine    Infusion 1 MICROgram(s)/kG/Min (12.806 mL/Hr) IV Continuous <Continuous>  piperacillin/tazobactam IVPB.. 3.375 Gram(s) IV Intermittent every 8 hours  sodium chloride 3%  Inhalation 4 milliLiter(s) Inhalation every 6 hours  valproate sodium IVPB 750 milliGRAM(s) IV Intermittent every 8 hours  vancomycin  IVPB      vancomycin  IVPB 1000 milliGRAM(s) IV Intermittent every 12 hours    MEDICATIONS  (PRN):  sodium chloride 0.9% lock flush 10 milliLiter(s) IV Push every 1 hour PRN Pre/post blood products, medications, blood draw, and to maintain line patency    Allergies    Topamax (Unknown)    Intolerances    LABS:                       10.3   9.37  )-----------( 316      ( 03 Sep 2019 04:20 )             33.3     09-03    143  |  105  |  12  ----------------------------<  110<H>  4.0   |  27  |  0.44<L>    Ca    8.7      03 Sep 2019 04:20  Phos  3.9     09-03  Mg     1.9     09-03    TPro  6.8  /  Alb  2.8<L>  /  TBili  0.3  /  DBili  x   /  AST  59<H>  /  ALT  32  /  AlkPhos  132<H>  09-03    PT/INR - ( 02 Sep 2019 00:10 )   PT: 12.2 SEC;   INR: 1.10          PTT - ( 02 Sep 2019 00:10 )  PTT:32.1 SEC  ABG - ( 02 Sep 2019 00:10 )  pH, Arterial: 7.40  pH, Blood: x     /  pCO2: 43    /  pO2: 108   / HCO3: 26    / Base Excess: 1.8   /  SaO2: 98.1      Lactate Trend  09-02 @ 00:10 Lactate:1.6     CAPILLARY BLOOD GLUCOSE    folate >20  b12 1540  levetiracetam level: 62.3  ammonia: 49  Valproic Acid Level, Serum: 37.2 ug/mL (09.01.19 @ 05:20)  Vancomycin Level, Trough: 17.2 (09.02.19 @ 00:10)  BCx 8/31 NGTD    RADIOLOGY & ADDITIONAL TESTS: no new imaging.    9/3/2019  Abnormal EEG study. Unchanged from prior 24h   1. Moderate nonspecific diffuse or multifocal cerebral dysfunction.   2. No epileptiform pattern or seizures seen.

## 2019-09-03 NOTE — PROGRESS NOTE ADULT - ATTENDING COMMENTS
Pt seen with fellow.  Agree with above.  Goal is to extubate pt medically.  If pt needs to be reintubated, family ok with this procedure.  However, not interested in long term vent support.  At that time, will need to involve [facility].

## 2019-09-03 NOTE — EEG REPORT - NS EEG TEXT BOX
Study Date: 9/2/2019 Start 9:01 to 9/3/19  _____________________________________________________________  HISTORY    Patient is a 54y old  Female who presents with a chief complaint of Septic Shock (01 Sep 2019 10:17)  ________________________________________________________  STUDY INTERPRETATION    Findings: The record is unchanged from that of the prior 24h. The background was continuous, spontaneously variable and reactive. No posterior dominant rhythm seen.     Background Slowing:  Diffuse theta and polymorphic delta slowing.    Focal Slowing:   None were present.    Sleep Background:  Drowsiness was characterized by fragmentation, attenuation, and slowing of the background activity.    Stage II sleep transients was not recorded.     Other Non-Epileptiform Findings:  None were present.    Interictal Epileptiform Activity:   None were present.    Events:  none.     Activation Procedures:   Hyperventilation was not performed.    Photic stimulation was not performed.     Artifacts:  Intermittent myogenic and movement artifacts were noted.    ECG:  The heart rate on single channel ECG was predominantly between 60-70 BPM.    _____________________________________________________________  EEG SUMMARY/CLASSIFICATION    ***PRELIM REPORT  Abnormal EEG in a sedated patient.  - Moderate generalized slowing.    _____________________________________________________________  EEG IMPRESSION/CLINICAL CORRELATE    Abnormal EEG study. Unchanged from prior 24h   1. Mild to moderate nonspecific diffuse or multifocal cerebral dysfunction.   2. No epileptiform pattern or seizures seen.     _____________________________________________________________    Jenny Padilla MD  Epilepsy Fellow    Gonsalo Rojo MD  EEG Attending Study Date: 9/2/2019 9:01 to 9/3/19 10:16  _____________________________________________________________  HISTORY    Patient is a 54y old  Female who presents with a chief complaint of Septic Shock (01 Sep 2019 10:17)  ________________________________________________________  STUDY INTERPRETATION    Findings: The background was continuous, spontaneously variable and reactive. No posterior dominant rhythm seen.     Background Slowing:  Diffuse theta and polymorphic delta slowing.    Focal Slowing:   None were present.    Sleep Background:  Drowsiness was characterized by fragmentation, attenuation, and slowing of the background activity.    Stage II sleep transients was not recorded.     Other Non-Epileptiform Findings:  None were present.    Interictal Epileptiform Activity:   None were present.    Events:  none.     Activation Procedures:   Hyperventilation was not performed.    Photic stimulation was not performed.     Artifacts:  Intermittent myogenic and movement artifacts were noted.    ECG:  The heart rate on single channel ECG was predominantly between 60-70 BPM.    _____________________________________________________________  EEG SUMMARY/CLASSIFICATION      Abnormal EEG in a sedated patient.  - Moderate generalized slowing.    _____________________________________________________________  EEG IMPRESSION/CLINICAL CORRELATE    Abnormal EEG study. Unchanged from prior 24h   1. Moderate nonspecific diffuse or multifocal cerebral dysfunction.   2. No epileptiform pattern or seizures seen.     _____________________________________________________________    Mireille Beckman DO  Epilepsy Fellow    Tex Skinner MD  EEG Attending

## 2019-09-03 NOTE — PROGRESS NOTE ADULT - PROBLEM SELECTOR PLAN 4
Patient remains on 5 AED for seizure control. No reported seizure overnight. Continue current therapy as per medical team.

## 2019-09-03 NOTE — PROGRESS NOTE ADULT - ATTENDING COMMENTS
54 year old woman with cerebral palsy, seizures intubated for airway protection secondary to status epilepticus being treated for pneumonia    - alert and awake following commands  - SBT for possible extubation  - continue ABx  - wean off vasopressors  - continue AED  - follow up neurology recommendations

## 2019-09-03 NOTE — PROGRESS NOTE ADULT - PROBLEM SELECTOR PLAN 6
Spoke with [employer] [facility] [employer] [facility] ([facility]), stated that patient resides in an [facility] facility through [facility]. Patient will require a MOLST checklist be filled out and that patient be evaluated by LS  prior to any withdrawal of care. Patient's mother (Evonne Ponce [phone]) is her legal 17a guardian. As per conversation with patient mother she stated that patient is to remain full code, however if patient was to be reintubated and a tracheostomy becomes a necessity that she would be opposed to such measures that would require permanent ventilator dependence. GOC are established. Palliative care to sign off.

## 2019-09-03 NOTE — PROGRESS NOTE ADULT - ASSESSMENT
53 y/o F w/ PMHx of cerebral palsy and seizure disorder presenting from group home for acute AMS and increased work of breathing with recent seizure found to be hypothermic, hypotensive and bradycardic with leukocytosis and imaging showing new right lung opacification concerning for septic shock 2/2 to aspiration PNA requiring pressors and airway support, intermittently requiring pressors, seizures controlled with 5 AEDs. Palliative care consulted for GOC in the setting of sepsis and respiratory failure.

## 2019-09-03 NOTE — PROGRESS NOTE ADULT - SUBJECTIVE AND OBJECTIVE BOX
SUBJECTIVE AND OBJECTIVE:  INTERVAL HPI/OVERNIGHT EVENTS: No acute events overnight. Patient remains intubated, however more awake this morning. As per primary team, will attempt to wean and extubate patient. Patient appears in NAD, not on pressor support with VS WNL.    DNR on chart:   Allergies    Topamax (Unknown)    Intolerances    MEDICATIONS  (STANDING):  chlorhexidine 4% Liquid 1 Application(s) Topical <User Schedule>  Clobazam Suspension 10 milliGRAM(s) Oral two times a day  enoxaparin Injectable 40 milliGRAM(s) SubCutaneous daily  lacosamide Solution 100 milliGRAM(s) Oral <User Schedule>  levETIRAcetam  IVPB 1000 milliGRAM(s) IV Intermittent <User Schedule>  OLANZapine 7.5 milliGRAM(s) Oral at bedtime  petrolatum Ophthalmic Ointment 1 Application(s) Both EYES two times a day  phenylephrine    Infusion 1 MICROgram(s)/kG/Min (12.806 mL/Hr) IV Continuous <Continuous>  piperacillin/tazobactam IVPB.. 3.375 Gram(s) IV Intermittent every 8 hours  sodium chloride 3%  Inhalation 4 milliLiter(s) Inhalation every 6 hours  valproate sodium IVPB 750 milliGRAM(s) IV Intermittent every 8 hours  vancomycin  IVPB      vancomycin  IVPB 1000 milliGRAM(s) IV Intermittent every 12 hours    MEDICATIONS  (PRN):  sodium chloride 0.9% lock flush 10 milliLiter(s) IV Push every 1 hour PRN Pre/post blood products, medications, blood draw, and to maintain line patency      ITEMS UNCHECKED ARE NOT PRESENT    PRESENT SYMPTOMS: [x]Unable to obtain due to poor mentation   Source if other than patient:  [ ]Family   [ ]Team     Patient is non-verbal at baseline  Pain:  [ ] yes [x] no  QOL impact -   Location -                    Aggravating factors -  Quality -  Radiation -  Timing-  Severity (0-10 scale):  Minimal acceptable level (0-10 scale):     Dyspnea:                           [ ]Mild [ ]Moderate [ ]Severe  Anxiety:                             [x]Mild [ ]Moderate [ ]Severe  Fatigue:                             [ ]Mild [ ]Moderate [ ]Severe  Nausea:                             [ ]Mild [ ]Moderate [ ]Severe  Loss of appetite:              [ ]Mild [ ]Moderate [ ]Severe  Constipation:                    [ ]Mild [ ]Moderate [ ]Severe    PAIN AD Score:	2 for occasional grimace  [email or web address] (Ctrl + left click to view)    Other Symptoms: Patient is nonverbal at baseline, currently intubated.  [ ]All other review of systems negative     Karnofsky Performance Score/Palliative Performance Status Version 2:  10%    [email or web address]  PHYSICAL EXAM:  Vital Signs Last 24 Hrs  T(C): 37.1 (03 Sep 2019 12:00), Max: 37.1 (03 Sep 2019 00:00)  T(F): 98.8 (03 Sep 2019 12:00), Max: 98.8 (03 Sep 2019 00:00)  HR: 71 (03 Sep 2019 15:00) (52 - 89)  BP: 102/63 (03 Sep 2019 14:00) (94/58 - 135/57)  BP(mean): 69 (03 Sep 2019 14:00) (66 - 85)  RR: 8 (03 Sep 2019 15:00) (6 - 18)  SpO2: 100% (03 Sep 2019 15:00) (96% - 100%) I&O's Summary    02 Sep 2019 07:01  -  03 Sep 2019 07:00  --------------------------------------------------------  IN: 3002.6 mL / OUT: 2150 mL / NET: 852.6 mL    03 Sep 2019 07:01  -  03 Sep 2019 17:52  --------------------------------------------------------  IN: 136 mL / OUT: 700 mL / NET: -564 mL     GENERAL: Ventilator dependent  [ ]Alert  [ ]Oriented x   [ ]Lethargic  [ ]Cachexia  [ ]Unarousable  [ ]Verbal  [x]Non-Verbal  Behavioral:   [ ] Anxiety  [ ] Delirium [x] Agitation [ ] Other  HEENT:  [ ]Normal   [ ]Dry mouth   [x]ET Tube/Trach  [ ]Oral lesions  PULMONARY:   [x]Clear [ ]Tachypnea  [ ]Audible excessive secretions   [ ]Rhonchi        [ ]Right [ ]Left [ ]Bilateral  [ ]Crackles        [ ]Right [ ]Left [ ]Bilateral  [ ]Wheezing     [ ]Right [ ]Left [ ]Bilateral  CARDIOVASCULAR:    [x]Regular [ ]Irregular [ ]Tachy  [ ]Main [ ]Murmur [ ]Other  GASTROINTESTINAL:  [x]Soft  [ ]Distended   [x]+BS  [x]Non tender [ ]Tender  [x]PEG [ ]OGT/ NGT    08-27-19 @ 07:01  -  08-28-19 @ 07:00  --------------------------------------------------------  OUT: 300 mL    08-29-19 @ 07:01  -  08-30-19 @ 07:00  --------------------------------------------------------  OUT: 100 mL    08-30-19 @ 07:01  -  08-31-19 @ 07:00  --------------------------------------------------------  OUT: 100 mL    08-31-19 @ 07:01  -  09-01-19 @ 07:00  --------------------------------------------------------  OUT: 200 mL    09-01-19 @ 07:01  -  09-02-19 @ 07:00  --------------------------------------------------------  OUT: 170 mL    09-02-19 @ 07:01  -  09-03-19 @ 07:00  --------------------------------------------------------  OUT: 350 mL       GENITOURINARY:  [ ]Normal [ ] Incontinent   [ ]Oliguria/Anuria   [x]Cazares  MUSCULOSKELETAL:   [ ]Normal   [ ]Weakness  [x]Bed/Wheelchair bound [ ]Edema  NEUROLOGIC: Cerebral Palsy  [ ]No focal deficits  [x] Cognitive impairment  [ ] Dysphagia [ ]Dysarthria [ ] Paresis [ ]Other   SKIN:   [ ]Normal   [ ]Pressure ulcer(s)  [ ]Rash [x] please see nursing notes for further details    CRITICAL CARE:  [ ] Shock Present  [ ]Septic [ ]Cardiogenic [ ]Neurologic [ ]Hypovolemic  [ ]  Vasopressors [ ]  Inotropes   [x] Respiratory failure present [x] Mechanical Ventilation [ ] Non-invasive ventilatory support [ ] High-Flow  [x] Acute  [ ] Chronic [ ] Hypoxic  [ ] Hypercarbic [ ] Other  [ ] Other organ failure     LABS:                        10.3   9.37  )-----------( 316      ( 03 Sep 2019 04:20 )             33.3   09-03    143  |  105  |  12  ----------------------------<  110<H>  4.0   |  27  |  0.44<L>    Ca    8.7      03 Sep 2019 04:20  Phos  3.9     09-03  Mg     1.9     09-03    TPro  6.8  /  Alb  2.8<L>  /  TBili  0.3  /  DBili  x   /  AST  59<H>  /  ALT  32  /  AlkPhos  132<H>  09-03  PT/INR - ( 02 Sep 2019 00:10 )   PT: 12.2 SEC;   INR: 1.10          PTT - ( 02 Sep 2019 00:10 )  PTT:32.1 SEC      RADIOLOGY & ADDITIONAL STUDIES: none new.    Protein Calorie Malnutrition Present: [ ] yes [ ] no  [x] PPSV2 < or = 30%  [ ] significant weight loss [x] poor nutritional intake [x] anasarca [x] catabolic state Albumin, Serum: 2.8 g/dL (09-03-19 @ 04:20)  Artificial Nutrition [x]     REFERRALS:   [ ]Chaplaincy  [ ] Hospice  [ ]Child Life  [x]Social Work  [x]Case management [ ]Holistic Therapy

## 2019-09-03 NOTE — PROGRESS NOTE ADULT - ASSESSMENT
Ms. Ponce is a 54F with a PMH of cerebral palsy and seizure disorder presenting from group home for acute AMS and increased work of breathing with recent seizure found to be hypothermic, hypotensive and bradycardic with leukocytosis and imaging showing new right lung opacification concerning for septic shock 2/2 to aspiration PNA requiring pressors and airway support, intermittently requiring pressors, seizures controlled with 4 AEDs.     NEURO: Patient has a history of cerebral palsy and seizure disorder. Baseline mental status minimally verbal but interactive/alert and withdraws to pain. Neurology on board. Lethargic currently 2/2 to metabolic encephalopathy due to infection.  -Intubated due to poor mental status s/p seizures concern for possible status epilepticus and hypercarbic respiratory failure  -Potential epileptogenic focus in the right fronto-temporal region shown on VEEG  -Spoke with Neuro in regards to pt's AEDs: valproate 750mg TID, levetiracetam 1000mg TID. Load lacosamide 200 mg IV, [id number] for maintenance. Now off lorazepam 2mg q6.   -Neuro recs appreciated.  -Restarted patient on home olanzapine - can lower seizure threshold, will consider d/cing.   -VPA level subtherapeutic, no dosing adjustment required as per neuro  -f/u clobazam and lacosamide level  -As per neuro, pt is at baseline mental status    CARDIOVASCULAR: No history of cardiac disease. However found to be hypotensive and bradycardic in the setting of sepsis. Pressor support improved SBPs and bradycardia.  -phenylephrine gtt titrating for goal MAP > 60     PULMONARY: Hypothermic with lung imaging concerning for new right lung opacification. On POCUS see signs of consolidation likely aspiration PNA   - S/p intubation in MICU for poor mental status and inability to maintain airway, as well as hypercarbic respiratory failure pCO2s 50s to 70s  -When plan to extubate will need steroids to minimize vocal cord edema  -Aspiration precautions  -Sputum Cx no growth  -on rpt CPAP, patient has low tidal volume, right shift cardiac frame, suspect atelectasis of LLB, may attempt bronchoscopy to clear mucus plug if present  -LLB atelectasis improved on imaging  -on CPAP, RSBI 60. May attempt extubation in PM  -Will consider bronchoscopy to clear secretions    GI: Patient has a history of enteritis in the past but no GI symptoms currently. Eats pureed foods.  - Nutrition recs appreciated, OGT feeding initiated  - Aspiration precautions     RENAL:   - Purewick catheter in place with clear urine output  - Replete electrolytes PRN    INFECTIOUS DISEASE:   - On admission, right lung opacification with hypothermia and leukocytosis meeting SIRS criteria with presumable source aspiration PNA complicated by hypotension 2/2 to septic shock. UA negative.   - If patient remains on high doses of pressor support will consider CT chest/abdomen/pelvis to rule out occult source of infection given also has a recent history of enteritis requiring MICU admission  -Follow up blood cultures  -s/p zosyn for aspiration pneumonia with last day 8/23  -Legionella negative  -CBCs with diff daily  -Sputum cultures showed no growth for 24h  -Pt now with increasing pressure requirement, febrile, may be in setting of new infection with sepsis - likely PNA, bedside POCUS with LLL lung consolidation  -F/u bcx, sputum cx, sputum acid fast  -on meropenem and vanc (8/31) for likely PNA      HEMATOLOGY: has thrombocytopenia but has had thrombocytopenia in the past could be 2/2 to infection  -Worsening thrombocytopenia, d/c'd heparin for now, possible cause Depakote went down on patient's Depakote dose, will trend platelets   - Platelets stable  - Macrocytic anemia, B12, folate - elevated    ENDOCRINE:  - No active issues    SKIN:  - Few blanching pressure wounds on back of feet and sacral area     DVT PPx:  - enoxaparin 40 mg subQ qd    Code Status: Full Ms. Ponce is a 54F with a PMH of cerebral palsy and seizure disorder presenting from group home for acute AMS and increased work of breathing with recent seizure found to be hypothermic, hypotensive and bradycardic with leukocytosis and imaging showing new right lung opacification concerning for septic shock 2/2 to aspiration PNA requiring pressors and airway support, intermittently requiring pressors, seizures controlled with 4 AEDs.     NEURO: Patient has a history of cerebral palsy and seizure disorder. Baseline mental status minimally verbal but interactive/alert and withdraws to pain. Neurology on board. Lethargic currently 2/2 to metabolic encephalopathy due to infection.  -Intubated due to poor mental status s/p seizures concern for possible status epilepticus and hypercarbic respiratory failure  -Potential epileptogenic focus in the right fronto-temporal region shown on VEEG  -Spoke with Neuro in regards to pt's AEDs: valproate 750mg TID, levetiracetam 1000mg TID. Load lacosamide 200 mg IV, [id number] for maintenance. Now off lorazepam 2mg q6.   -Neuro recs appreciated.  -Restarted patient on home olanzapine - can lower seizure threshold, will consider d/cing.   -VPA level subtherapeutic, no dosing adjustment required as per neuro  -f/u clobazam and lacosamide level  -As per neuro, pt is at baseline mental status    CARDIOVASCULAR: No history of cardiac disease. However found to be hypotensive and bradycardic in the setting of sepsis. Pressor support improved SBPs and bradycardia.  -phenylephrine gtt titrating for goal MAP > 60     PULMONARY: Hypothermic with lung imaging concerning for new right lung opacification. On POCUS see signs of consolidation likely aspiration PNA   - S/p intubation in MICU for poor mental status and inability to maintain airway, as well as hypercarbic respiratory failure pCO2s 50s to 70s  -When plan to extubate will need steroids to minimize vocal cord edema  -Aspiration precautions  -Sputum Cx no growth  -on rpt CPAP, patient has low tidal volume, right shift cardiac frame, suspect atelectasis of LLB, may attempt bronchoscopy to clear mucus plug if present  -LLB atelectasis improved on imaging  -on CPAP, RSBI 60. May attempt extubation in PM  -Will consider bronchoscopy to clear secretions    GI: Patient has a history of enteritis in the past but no GI symptoms currently. Eats pureed foods.  - Nutrition recs appreciated, OGT feeding initiated  - Aspiration precautions     RENAL:   - Purewick catheter in place with clear urine output  - Replete electrolytes PRN    INFECTIOUS DISEASE:   - On admission, right lung opacification with hypothermia and leukocytosis meeting SIRS criteria with presumable source aspiration PNA complicated by hypotension 2/2 to septic shock. UA negative.   - If patient remains on high doses of pressor support will consider CT chest/abdomen/pelvis to rule out occult source of infection given also has a recent history of enteritis requiring MICU admission  -Follow up blood cultures  -s/p zosyn for aspiration pneumonia with last day 8/23  -Legionella negative  -CBCs with diff daily  -Sputum cultures showed no growth for 24h  -Pt now with increasing pressure requirement, febrile, may be in setting of new infection with sepsis - likely PNA, bedside POCUS with LLL lung consolidation  -F/u bcx, sputum cx, sputum acid fast  -on meropenem and vanc (8/31) for likely PNA. will switch jesusita to aztreonam. To end after tomorrow (five days)    HEMATOLOGY: has thrombocytopenia but has had thrombocytopenia in the past could be 2/2 to infection  - Worsening thrombocytopenia, d/c'd heparin for now, possible cause Depakote went down on patient's Depakote dose, will trend platelets   - Platelets stable  - Macrocytic anemia, B12, folate - elevated    ENDOCRINE:  - No active issues    SKIN:  - Few blanching pressure wounds on back of feet and sacral area     DVT PPx:  - enoxaparin 40 mg subQ qd    Code Status: Full Ms. Ponce is a 54F with a PMH of cerebral palsy and seizure disorder presenting from group home for acute AMS and increased work of breathing with recent seizure found to be hypothermic, hypotensive and bradycardic with leukocytosis and imaging showing new right lung opacification concerning for septic shock 2/2 to aspiration PNA requiring pressors and airway support, intermittently requiring pressors, seizures controlled with 4 AEDs.     NEURO: Patient has a history of cerebral palsy and seizure disorder. Baseline mental status minimally verbal but interactive/alert and withdraws to pain. Neurology on board. Lethargic currently 2/2 to metabolic encephalopathy due to infection.  -Intubated due to poor mental status s/p seizures concern for possible status epilepticus and hypercarbic respiratory failure  -Potential epileptogenic focus in the right fronto-temporal region shown on VEEG  -Spoke with Neuro in regards to pt's AEDs: valproate 750mg TID, levetiracetam 1000mg TID. Load lacosamide 200 mg IV, [id number] for maintenance. Now off lorazepam 2mg q6.   -Neuro recs appreciated.  -Restarted patient on home olanzapine - can lower seizure threshold, will consider d/cing.   -VPA level subtherapeutic, no dosing adjustment required as per neuro  -f/u clobazam and lacosamide level  -As per neuro, pt is at baseline mental status    CARDIOVASCULAR: No history of cardiac disease. However found to be hypotensive and bradycardic in the setting of sepsis. Pressor support improved SBPs and bradycardia.  -phenylephrine gtt titrating for goal MAP > 60     PULMONARY: Hypothermic with lung imaging concerning for new right lung opacification. On POCUS see signs of consolidation likely aspiration PNA   - S/p intubation in MICU for poor mental status and inability to maintain airway, as well as hypercarbic respiratory failure pCO2s 50s to 70s  -When plan to extubate will need steroids to minimize vocal cord edema  -Aspiration precautions  -Sputum Cx no growth  -on rpt CPAP, patient has low tidal volume, right shift cardiac frame, suspect atelectasis of LLB, may attempt bronchoscopy to clear mucus plug if present  -LLB atelectasis improved on imaging  -on CPAP, RSBI 60. May attempt extubation in PM  -Will consider bronchoscopy to clear secretions    GI: Patient has a history of enteritis in the past but no GI symptoms currently. Eats pureed foods.  - Nutrition recs appreciated, OGT feeding initiated  - Aspiration precautions     RENAL:   - Purewick catheter in place with clear urine output  - Replete electrolytes PRN    INFECTIOUS DISEASE:   - On admission, right lung opacification with hypothermia and leukocytosis meeting SIRS criteria with presumable source aspiration PNA complicated by hypotension 2/2 to septic shock. UA negative.   - If patient remains on high doses of pressor support will consider CT chest/abdomen/pelvis to rule out occult source of infection given also has a recent history of enteritis requiring MICU admission  -Follow up blood cultures  -s/p zosyn for aspiration pneumonia with last day 8/23  -Legionella negative  -CBCs with diff daily  -Sputum cultures showed no growth for 24h  -Pt now with increasing pressure requirement, febrile, may be in setting of new infection with sepsis - likely PNA, bedside POCUS with LLL lung consolidation  -F/u bcx, sputum cx, sputum acid fast  -on meropenem and vanc (8/31) for likely PNA. will switch jesusita to zosyn. To end after tomorrow (five days)    HEMATOLOGY: has thrombocytopenia but has had thrombocytopenia in the past could be 2/2 to infection  - Worsening thrombocytopenia, d/c'd heparin for now, possible cause Depakote went down on patient's Depakote dose, will trend platelets   - Platelets stable  - Macrocytic anemia, B12, folate - elevated    ENDOCRINE:  - No active issues    SKIN:  - Few blanching pressure wounds on back of feet and sacral area     DVT PPx:  - enoxaparin 40 mg subQ qd    Code Status: Full

## 2019-09-03 NOTE — PROGRESS NOTE ADULT - PROBLEM SELECTOR PLAN 2
Patient remains on ventilatory support. Patient is more responsive today and currently in the process of weaning trial and possible extubation in MICU.

## 2019-09-04 LAB
ALBUMIN SERPL ELPH-MCNC: 3.1 G/DL — LOW (ref 3.3–5)
ALP SERPL-CCNC: 120 U/L — SIGNIFICANT CHANGE UP (ref 40–120)
ALT FLD-CCNC: 26 U/L — SIGNIFICANT CHANGE UP (ref 4–33)
ANION GAP SERPL CALC-SCNC: 14 MMO/L — SIGNIFICANT CHANGE UP (ref 7–14)
AST SERPL-CCNC: 39 U/L — HIGH (ref 4–32)
BASOPHILS # BLD AUTO: 0.04 K/UL — SIGNIFICANT CHANGE UP (ref 0–0.2)
BASOPHILS NFR BLD AUTO: 0.4 % — SIGNIFICANT CHANGE UP (ref 0–2)
BILIRUB SERPL-MCNC: 0.5 MG/DL — SIGNIFICANT CHANGE UP (ref 0.2–1.2)
BUN SERPL-MCNC: 14 MG/DL — SIGNIFICANT CHANGE UP (ref 7–23)
CALCIUM SERPL-MCNC: 9.4 MG/DL — SIGNIFICANT CHANGE UP (ref 8.4–10.5)
CHLORIDE SERPL-SCNC: 101 MMOL/L — SIGNIFICANT CHANGE UP (ref 98–107)
CO2 SERPL-SCNC: 28 MMOL/L — SIGNIFICANT CHANGE UP (ref 22–31)
CREAT SERPL-MCNC: 0.43 MG/DL — LOW (ref 0.5–1.3)
EOSINOPHIL # BLD AUTO: 0.17 K/UL — SIGNIFICANT CHANGE UP (ref 0–0.5)
EOSINOPHIL NFR BLD AUTO: 1.8 % — SIGNIFICANT CHANGE UP (ref 0–6)
GLUCOSE SERPL-MCNC: 96 MG/DL — SIGNIFICANT CHANGE UP (ref 70–99)
HCT VFR BLD CALC: 37.2 % — SIGNIFICANT CHANGE UP (ref 34.5–45)
HGB BLD-MCNC: 11.5 G/DL — SIGNIFICANT CHANGE UP (ref 11.5–15.5)
IMM GRANULOCYTES NFR BLD AUTO: 0.8 % — SIGNIFICANT CHANGE UP (ref 0–1.5)
LEVETIRACETAM SERPL-MCNC: SIGNIFICANT CHANGE UP
LYMPHOCYTES # BLD AUTO: 2.71 K/UL — SIGNIFICANT CHANGE UP (ref 1–3.3)
LYMPHOCYTES # BLD AUTO: 29.2 % — SIGNIFICANT CHANGE UP (ref 13–44)
MAGNESIUM SERPL-MCNC: 2 MG/DL — SIGNIFICANT CHANGE UP (ref 1.6–2.6)
MCHC RBC-ENTMCNC: 30.9 % — LOW (ref 32–36)
MCHC RBC-ENTMCNC: 32.9 PG — SIGNIFICANT CHANGE UP (ref 27–34)
MCV RBC AUTO: 106.3 FL — HIGH (ref 80–100)
MONOCYTES # BLD AUTO: 1.39 K/UL — HIGH (ref 0–0.9)
MONOCYTES NFR BLD AUTO: 15 % — HIGH (ref 2–14)
NEUTROPHILS # BLD AUTO: 4.9 K/UL — SIGNIFICANT CHANGE UP (ref 1.8–7.4)
NEUTROPHILS NFR BLD AUTO: 52.8 % — SIGNIFICANT CHANGE UP (ref 43–77)
NRBC # FLD: 0 K/UL — SIGNIFICANT CHANGE UP (ref 0–0)
PHOSPHATE SERPL-MCNC: 5.3 MG/DL — HIGH (ref 2.5–4.5)
PLATELET # BLD AUTO: 348 K/UL — SIGNIFICANT CHANGE UP (ref 150–400)
PMV BLD: 9.3 FL — SIGNIFICANT CHANGE UP (ref 7–13)
POTASSIUM SERPL-MCNC: 4.7 MMOL/L — SIGNIFICANT CHANGE UP (ref 3.5–5.3)
POTASSIUM SERPL-SCNC: 4.7 MMOL/L — SIGNIFICANT CHANGE UP (ref 3.5–5.3)
PROT SERPL-MCNC: 7.5 G/DL — SIGNIFICANT CHANGE UP (ref 6–8.3)
RBC # BLD: 3.5 M/UL — LOW (ref 3.8–5.2)
RBC # FLD: 15.7 % — HIGH (ref 10.3–14.5)
SODIUM SERPL-SCNC: 143 MMOL/L — SIGNIFICANT CHANGE UP (ref 135–145)
WBC # BLD: 9.28 K/UL — SIGNIFICANT CHANGE UP (ref 3.8–10.5)
WBC # FLD AUTO: 9.28 K/UL — SIGNIFICANT CHANGE UP (ref 3.8–10.5)

## 2019-09-04 PROCEDURE — 99291 CRITICAL CARE FIRST HOUR: CPT

## 2019-09-04 RX ORDER — ALBUTEROL 90 UG/1
2.5 AEROSOL, METERED ORAL EVERY 6 HOURS
Refills: 0 | Status: DISCONTINUED | OUTPATIENT
Start: 2019-09-04 | End: 2019-09-12

## 2019-09-04 RX ORDER — MIDODRINE HYDROCHLORIDE 2.5 MG/1
20 TABLET ORAL EVERY 8 HOURS
Refills: 0 | Status: DISCONTINUED | OUTPATIENT
Start: 2019-09-04 | End: 2019-09-05

## 2019-09-04 RX ORDER — SODIUM CHLORIDE 9 MG/ML
4 INJECTION INTRAMUSCULAR; INTRAVENOUS; SUBCUTANEOUS EVERY 6 HOURS
Refills: 0 | Status: DISCONTINUED | OUTPATIENT
Start: 2019-09-04 | End: 2019-09-12

## 2019-09-04 RX ORDER — SODIUM CHLORIDE 9 MG/ML
10 INJECTION INTRAMUSCULAR; INTRAVENOUS; SUBCUTANEOUS DAILY
Refills: 0 | Status: DISCONTINUED | OUTPATIENT
Start: 2019-09-04 | End: 2019-09-04

## 2019-09-04 RX ORDER — MIDODRINE HYDROCHLORIDE 2.5 MG/1
10 TABLET ORAL ONCE
Refills: 0 | Status: COMPLETED | OUTPATIENT
Start: 2019-09-04 | End: 2019-09-04

## 2019-09-04 RX ORDER — MIDODRINE HYDROCHLORIDE 2.5 MG/1
10 TABLET ORAL EVERY 8 HOURS
Refills: 0 | Status: DISCONTINUED | OUTPATIENT
Start: 2019-09-04 | End: 2019-09-04

## 2019-09-04 RX ORDER — ALBUTEROL 90 UG/1
1 AEROSOL, METERED ORAL EVERY 4 HOURS
Refills: 0 | Status: DISCONTINUED | OUTPATIENT
Start: 2019-09-04 | End: 2019-09-24

## 2019-09-04 RX ADMIN — PHENYLEPHRINE HYDROCHLORIDE 12.81 MICROGRAM(S)/KG/MIN: 10 INJECTION INTRAVENOUS at 19:46

## 2019-09-04 RX ADMIN — LEVETIRACETAM 400 MILLIGRAM(S): 250 TABLET, FILM COATED ORAL at 14:28

## 2019-09-04 RX ADMIN — OLANZAPINE 7.5 MILLIGRAM(S): 15 TABLET, FILM COATED ORAL at 21:50

## 2019-09-04 RX ADMIN — LACOSAMIDE 100 MILLIGRAM(S): 50 TABLET ORAL at 21:50

## 2019-09-04 RX ADMIN — PIPERACILLIN AND TAZOBACTAM 25 GRAM(S): 4; .5 INJECTION, POWDER, LYOPHILIZED, FOR SOLUTION INTRAVENOUS at 14:28

## 2019-09-04 RX ADMIN — SODIUM CHLORIDE 4 MILLILITER(S): 9 INJECTION INTRAMUSCULAR; INTRAVENOUS; SUBCUTANEOUS at 15:54

## 2019-09-04 RX ADMIN — Medication 250 MILLIGRAM(S): at 17:03

## 2019-09-04 RX ADMIN — PHENYLEPHRINE HYDROCHLORIDE 12.81 MICROGRAM(S)/KG/MIN: 10 INJECTION INTRAVENOUS at 07:31

## 2019-09-04 RX ADMIN — CHLORHEXIDINE GLUCONATE 1 APPLICATION(S): 213 SOLUTION TOPICAL at 07:30

## 2019-09-04 RX ADMIN — LACOSAMIDE 100 MILLIGRAM(S): 50 TABLET ORAL at 14:28

## 2019-09-04 RX ADMIN — LEVETIRACETAM 400 MILLIGRAM(S): 250 TABLET, FILM COATED ORAL at 07:30

## 2019-09-04 RX ADMIN — PIPERACILLIN AND TAZOBACTAM 25 GRAM(S): 4; .5 INJECTION, POWDER, LYOPHILIZED, FOR SOLUTION INTRAVENOUS at 21:50

## 2019-09-04 RX ADMIN — ALBUTEROL 2.5 MILLIGRAM(S): 90 AEROSOL, METERED ORAL at 23:47

## 2019-09-04 RX ADMIN — LEVETIRACETAM 400 MILLIGRAM(S): 250 TABLET, FILM COATED ORAL at 19:46

## 2019-09-04 RX ADMIN — MIDODRINE HYDROCHLORIDE 10 MILLIGRAM(S): 2.5 TABLET ORAL at 14:30

## 2019-09-04 RX ADMIN — MIDODRINE HYDROCHLORIDE 10 MILLIGRAM(S): 2.5 TABLET ORAL at 15:50

## 2019-09-04 RX ADMIN — Medication 250 MILLIGRAM(S): at 05:02

## 2019-09-04 RX ADMIN — Medication 28.75 MILLIGRAM(S): at 18:34

## 2019-09-04 RX ADMIN — CLOBAZAM 10 MILLIGRAM(S): 10 TABLET ORAL at 18:34

## 2019-09-04 RX ADMIN — PIPERACILLIN AND TAZOBACTAM 25 GRAM(S): 4; .5 INJECTION, POWDER, LYOPHILIZED, FOR SOLUTION INTRAVENOUS at 06:23

## 2019-09-04 RX ADMIN — LACOSAMIDE 100 MILLIGRAM(S): 50 TABLET ORAL at 05:02

## 2019-09-04 RX ADMIN — MIDODRINE HYDROCHLORIDE 20 MILLIGRAM(S): 2.5 TABLET ORAL at 21:50

## 2019-09-04 RX ADMIN — SODIUM CHLORIDE 4 MILLILITER(S): 9 INJECTION INTRAMUSCULAR; INTRAVENOUS; SUBCUTANEOUS at 23:47

## 2019-09-04 RX ADMIN — Medication 28.75 MILLIGRAM(S): at 05:02

## 2019-09-04 RX ADMIN — ALBUTEROL 2.5 MILLIGRAM(S): 90 AEROSOL, METERED ORAL at 11:29

## 2019-09-04 RX ADMIN — ALBUTEROL 2.5 MILLIGRAM(S): 90 AEROSOL, METERED ORAL at 15:53

## 2019-09-04 RX ADMIN — CLOBAZAM 10 MILLIGRAM(S): 10 TABLET ORAL at 05:31

## 2019-09-04 RX ADMIN — MIDODRINE HYDROCHLORIDE 10 MILLIGRAM(S): 2.5 TABLET ORAL at 07:55

## 2019-09-04 RX ADMIN — ENOXAPARIN SODIUM 40 MILLIGRAM(S): 100 INJECTION SUBCUTANEOUS at 11:09

## 2019-09-04 RX ADMIN — Medication 28.75 MILLIGRAM(S): at 12:23

## 2019-09-04 NOTE — PROGRESS NOTE ADULT - ATTENDING COMMENTS
54 year old woman with cerebral palsy, seizures intubated for airway protection secondary to status epilepticus being treated for pneumonia    - alert and awake following commands  - extubated yesterday   - complete ABx  - wean off vasopressors  - continue AED

## 2019-09-04 NOTE — SWALLOW BEDSIDE ASSESSMENT ADULT - COMMENTS
Per chart review and discussion with RN, pt extubated 9/3 and now requiring face tent for supplemental O2. Will f/u when pt is able to tolerate NC and/or room air and as schedule permits.

## 2019-09-04 NOTE — PROGRESS NOTE ADULT - SUBJECTIVE AND OBJECTIVE BOX
Hola Draper MD, PGY-1  Pager #[phone]  Spectra #[phone]  ---------------------------------------------  INTERVAL HPI/OVERNIGHT EVENTS: Extubated yesterday, satting well on face tent. No overnight events.      SUBJECTIVE: Patient seen and examined at bedside.     OBJECTIVE:  ICU Vital Signs Last 24 Hrs  T(F): 98.1 (04 Sep 2019 04:00), Max: 98.8 (03 Sep 2019 12:00)  HR: 82 (04 Sep 2019 06:00) (57 - 116)  BP: 94/52 (04 Sep 2019 06:00) (81/50 - 147/100)  BP(mean): 66 (04 Sep 2019 06:00) (56 - 112)  RR: 23 (04 Sep 2019 06:00) (6 - 25)  SpO2: 96% (04 Sep 2019 06:00) (92% - 100%)    Physical Exam:   General: alert, female, intubated, in NAD  HEENT: NC/AT  Neck: supple  Respiratory: CTA b/l  Cardiovascular: +S1/S2; tachycardic  Abdomen: soft, NT/ND; +BS   Extremities: WWP, 2+ peripheral pulses b/l; no LE edema  Skin: normal color and turgor; no rash  Neuro: alert, noncommunicative, moving UE spontaneously, turning head to stimuli   I&O's Summary    02 Sep 2019 07:01  -  03 Sep 2019 07:00  --------------------------------------------------------  IN: 3002.6 mL / OUT: 2150 mL / NET: 852.6 mL    03 Sep 2019 07:01  -  04 Sep 2019 06:56  --------------------------------------------------------  IN: 826.3 mL / OUT: 1800 mL / NET: -973.7 mL      MEDICATIONS  (STANDING):  chlorhexidine 4% Liquid 1 Application(s) Topical <User Schedule>  Clobazam Suspension 10 milliGRAM(s) Oral two times a day  enoxaparin Injectable 40 milliGRAM(s) SubCutaneous daily  lacosamide Solution 100 milliGRAM(s) Oral <User Schedule>  levETIRAcetam  IVPB 1000 milliGRAM(s) IV Intermittent <User Schedule>  OLANZapine 7.5 milliGRAM(s) Oral at bedtime  phenylephrine    Infusion 1 MICROgram(s)/kG/Min (12.806 mL/Hr) IV Continuous <Continuous>  piperacillin/tazobactam IVPB.. 3.375 Gram(s) IV Intermittent every 8 hours  valproate sodium IVPB 750 milliGRAM(s) IV Intermittent every 8 hours  vancomycin  IVPB      vancomycin  IVPB 1000 milliGRAM(s) IV Intermittent every 12 hours    MEDICATIONS  (PRN):  sodium chloride 0.9% lock flush 10 milliLiter(s) IV Push every 1 hour PRN Pre/post blood products, medications, blood draw, and to maintain line patency    Allergies    Topamax (Unknown)    Intolerances    LABS:             11.5   9.28  )-----------( 348      ( 04 Sep 2019 02:25 )             37.2     09-04    143  |  101  |  14  ----------------------------<  96  4.7   |  28  |  0.43<L>    Ca    9.4      04 Sep 2019 02:25  Phos  5.3     09-04  Mg     2.0     09-04    TPro  7.5  /  Alb  3.1<L>  /  TBili  0.5  /  DBili  x   /  AST  39<H>  /  ALT  26  /  AlkPhos  120  09-04    Lactate Trend  09-02 @ 00:10 Lactate:1.6     CAPILLARY BLOOD GLUCOSE    I&O's Summary    02 Sep 2019 07:01  -  03 Sep 2019 07:00  --------------------------------------------------------  IN: 3002.6 mL / OUT: 2150 mL / NET: 852.6 mL    03 Sep 2019 07:01  -  03 Sep 2019 14:24  --------------------------------------------------------  IN: 136 mL / OUT: 700 mL / NET: -564 mL    MEDICATIONS  (STANDING):  chlorhexidine 0.12% Liquid 15 milliLiter(s) Oral Mucosa every 12 hours  chlorhexidine 4% Liquid 1 Application(s) Topical <User Schedule>  Clobazam Suspension 10 milliGRAM(s) Oral two times a day  enoxaparin Injectable 40 milliGRAM(s) SubCutaneous daily  lacosamide Solution 100 milliGRAM(s) Oral <User Schedule>  levETIRAcetam  IVPB 1000 milliGRAM(s) IV Intermittent <User Schedule>  OLANZapine 7.5 milliGRAM(s) Oral at bedtime  petrolatum Ophthalmic Ointment 1 Application(s) Both EYES two times a day  phenylephrine    Infusion 1 MICROgram(s)/kG/Min (12.806 mL/Hr) IV Continuous <Continuous>  piperacillin/tazobactam IVPB.. 3.375 Gram(s) IV Intermittent every 8 hours  sodium chloride 3%  Inhalation 4 milliLiter(s) Inhalation every 6 hours  valproate sodium IVPB 750 milliGRAM(s) IV Intermittent every 8 hours  vancomycin  IVPB      vancomycin  IVPB 1000 milliGRAM(s) IV Intermittent every 12 hours    MEDICATIONS  (PRN):  sodium chloride 0.9% lock flush 10 milliLiter(s) IV Push every 1 hour PRN Pre/post blood products, medications, blood draw, and to maintain line patency    Allergies    Topamax (Unknown)    Intolerances    LABS:                       10.3   9.37  )-----------( 316      ( 03 Sep 2019 04:20 )             33.3     09-03    143  |  105  |  12  ----------------------------<  110<H>  4.0   |  27  |  0.44<L>    Ca    8.7      03 Sep 2019 04:20  Phos  3.9     09-03  Mg     1.9     09-03    TPro  6.8  /  Alb  2.8<L>  /  TBili  0.3  /  DBili  x   /  AST  59<H>  /  ALT  32  /  AlkPhos  132<H>  09-03    PT/INR - ( 02 Sep 2019 00:10 )   PT: 12.2 SEC;   INR: 1.10          PTT - ( 02 Sep 2019 00:10 )  PTT:32.1 SEC  ABG - ( 02 Sep 2019 00:10 )  pH, Arterial: 7.40  pH, Blood: x     /  pCO2: 43    /  pO2: 108   / HCO3: 26    / Base Excess: 1.8   /  SaO2: 98.1      Lactate Trend  09-02 @ 00:10 Lactate:1.6     CAPILLARY BLOOD GLUCOSE    folate >20  b12 1540  levetiracetam level: 62.3  ammonia: 49  Valproic Acid Level, Serum: 37.2 ug/mL (09.01.19 @ 05:20)  Vancomycin Level, Trough: 17.2 (09.02.19 @ 00:10)  BCx 8/31 NGTD    RADIOLOGY & ADDITIONAL TESTS: no new imaging.    9/3/2019  Abnormal EEG study. Unchanged from prior 24h   1. Moderate nonspecific diffuse or multifocal cerebral dysfunction.   2. No epileptiform pattern or seizures seen. Hola Draper MD, PGY-1  Pager #[phone]  Spectra #[phone]  ---------------------------------------------  INTERVAL HPI/OVERNIGHT EVENTS: Extubated yesterday, satting well on face tent. Hypothermic yesterday, was given jose hugger. NGT placed.     SUBJECTIVE: Patient seen and examined at bedside. Alert and awake, tracking with eyes, responding to stimuli. Exam limited due to baseline mental status.     OBJECTIVE:  ICU Vital Signs Last 24 Hrs  T(F): 98.1 (04 Sep 2019 04:00), Max: 98.8 (03 Sep 2019 12:00)  HR: 82 (04 Sep 2019 06:00) (57 - 116)  BP: 94/52 (04 Sep 2019 06:00) (81/50 - 147/100)  BP(mean): 66 (04 Sep 2019 06:00) (56 - 112)  RR: 23 (04 Sep 2019 06:00) (6 - 25)  SpO2: 96% (04 Sep 2019 06:00) (92% - 100%)    Physical Exam:   General: alert, female, intubated, in NAD  HEENT: NC/AT  Neck: supple  Respiratory: CTA b/l  Cardiovascular: +S1/S2; tachycardic  Abdomen: soft, NT/ND; +BS   Extremities: WWP, 2+ peripheral pulses b/l; no LE edema  Skin: normal color and turgor; no rash  Neuro: alert, noncommunicative, moving UE spontaneously, turning head to stimuli     I&O's Summary    02 Sep 2019 07:01  -  03 Sep 2019 07:00  --------------------------------------------------------  IN: 3002.6 mL / OUT: 2150 mL / NET: 852.6 mL    03 Sep 2019 07:01  -  04 Sep 2019 06:56  --------------------------------------------------------  IN: 826.3 mL / OUT: 1800 mL / NET: -973.7 mL      MEDICATIONS  (STANDING):  chlorhexidine 4% Liquid 1 Application(s) Topical <User Schedule>  Clobazam Suspension 10 milliGRAM(s) Oral two times a day  enoxaparin Injectable 40 milliGRAM(s) SubCutaneous daily  lacosamide Solution 100 milliGRAM(s) Oral <User Schedule>  levETIRAcetam  IVPB 1000 milliGRAM(s) IV Intermittent <User Schedule>  OLANZapine 7.5 milliGRAM(s) Oral at bedtime  phenylephrine    Infusion 1 MICROgram(s)/kG/Min (12.806 mL/Hr) IV Continuous <Continuous>  piperacillin/tazobactam IVPB.. 3.375 Gram(s) IV Intermittent every 8 hours  valproate sodium IVPB 750 milliGRAM(s) IV Intermittent every 8 hours  vancomycin  IVPB      vancomycin  IVPB 1000 milliGRAM(s) IV Intermittent every 12 hours    MEDICATIONS  (PRN):  sodium chloride 0.9% lock flush 10 milliLiter(s) IV Push every 1 hour PRN Pre/post blood products, medications, blood draw, and to maintain line patency    Allergies  Topamax (Unknown)  Intolerances    LABS:             11.5   9.28  )-----------( 348      ( 04 Sep 2019 02:25 )             37.2     09-04  143  |  101  |  14  ----------------------------<  96  4.7   |  28  |  0.43<L>    Ca    9.4      04 Sep 2019 02:25  Phos  5.3     09-04  Mg     2.0     09-04    TPro  7.5  /  Alb  3.1<L>  /  TBili  0.5  /  DBili  x   /  AST  39<H>  /  ALT  26  /  AlkPhos  120  09-04    Lactate Trend  09-02 @ 00:10 Lactate:1.6     folate >20  b12 1540  levetiracetam level: 62.3  ammonia: 49  Valproic Acid Level, Serum: 37.2 ug/mL (09.01.19 @ 05:20)  Vancomycin Level, Trough: 17.2 (09.02.19 @ 00:10)  BCx 8/31 NGTD    RADIOLOGY & ADDITIONAL TESTS: no new imaging.    9/3/2019  Abnormal EEG study. Unchanged from prior 24h   1. Moderate nonspecific diffuse or multifocal cerebral dysfunction.   2. No epileptiform pattern or seizures seen.

## 2019-09-04 NOTE — CHART NOTE - NSCHARTNOTEFT_GEN_A_CORE
Pt s/p extubation and is stable. EEG from today reviewed and there has been no further epileptic activity. None in the last few days.   Would discontinue EEG . Continue on current AED regimen.

## 2019-09-04 NOTE — PROGRESS NOTE ADULT - ASSESSMENT
Ms. Ponce is a 54F with a PMH of cerebral palsy and seizure disorder presenting from group home for acute AMS and increased work of breathing with recent seizure found to be hypothermic, hypotensive and bradycardic with leukocytosis and imaging showing new right lung opacification concerning for septic shock 2/2 to aspiration PNA requiring pressors and airway support, intermittently requiring pressors, seizures controlled with 4 AEDs.     NEURO: Patient has a history of cerebral palsy and seizure disorder. Baseline mental status minimally verbal but interactive/alert and withdraws to pain. Neurology on board. Lethargic currently 2/2 to metabolic encephalopathy due to infection.  -Intubated due to poor mental status s/p seizures concern for possible status epilepticus and hypercarbic respiratory failure  -Potential epileptogenic focus in the right fronto-temporal region shown on VEEG  -Spoke with Neuro in regards to pt's AEDs: valproate 750mg TID, levetiracetam 1000mg TID. Load lacosamide 200 mg IV, [id number] for maintenance. Now off lorazepam 2mg q6.   -Neuro recs appreciated.  -Restarted patient on home olanzapine - can lower seizure threshold, will consider d/cing.   -VPA level subtherapeutic, no dosing adjustment required as per neuro  -f/u clobazam and lacosamide level  -As per neuro, pt is at baseline mental status    CARDIOVASCULAR: No history of cardiac disease. However found to be hypotensive and bradycardic in the setting of sepsis. Pressor support improved SBPs and bradycardia.  -phenylephrine gtt titrating for goal MAP > 60     PULMONARY: Hypothermic with lung imaging concerning for new right lung opacification. On POCUS see signs of consolidation likely aspiration PNA   - S/p intubation in MICU for poor mental status and inability to maintain airway, as well as hypercarbic respiratory failure pCO2s 50s to 70s  -Aspiration precautions  -Sputum Cx no growth  -LLB atelectasis improved on imaging vs PNA consolidation?  -s/p extubation 9/3    GI: Patient has a history of enteritis in the past but no GI symptoms currently. Eats pureed foods.  - Nutrition recs appreciated, OGT feeding initiated  - Aspiration precautions     RENAL:   - Purewick catheter in place with clear urine output  - Replete electrolytes PRN    INFECTIOUS DISEASE:   - On admission, right lung opacification with hypothermia and leukocytosis meeting SIRS criteria with presumable source aspiration PNA complicated by hypotension 2/2 to septic shock. UA negative.   - If patient remains on high doses of pressor support will consider CT chest/abdomen/pelvis to rule out occult source of infection given also has a recent history of enteritis requiring MICU admission  -Follow up blood cultures  -s/p zosyn for aspiration pneumonia with last day 8/23  -Legionella negative  -CBCs with diff daily  -Sputum cultures showed no growth for 24h  -Pt now with increasing pressure requirement, febrile, may be in setting of new infection with sepsis - likely PNA, bedside POCUS with LLL lung consolidation  -F/u bcx, sputum cx, sputum acid fast  -on meropenem and vanc (8/31) for likely PNA. will switch jesusita to zosyn. To end after tomorrow (five days)    HEMATOLOGY: has thrombocytopenia but has had thrombocytopenia in the past could be 2/2 to infection  - Worsening thrombocytopenia, d/c'd heparin for now, possible cause Depakote went down on patient's Depakote dose, will trend platelets   - Platelets stable  - Macrocytic anemia, B12, folate - elevated    ENDOCRINE:  - No active issues    SKIN:  - Few blanching pressure wounds on back of feet and sacral area     DVT PPx:  - enoxaparin 40 mg subQ qd    Code Status: Full Ms. Ponce is a 54F with a PMH of cerebral palsy and seizure disorder presenting from group home for acute AMS and increased work of breathing with recent seizure found to be hypothermic, hypotensive and bradycardic with leukocytosis and imaging showing new right lung opacification concerning for septic shock 2/2 to aspiration PNA requiring pressors and airway support, intermittently requiring pressors, seizures controlled with 4 AEDs.     NEURO: Patient has a history of cerebral palsy and seizure disorder. Baseline mental status minimally verbal but interactive/alert and withdraws to pain. Neurology on board. Lethargic currently 2/2 to metabolic encephalopathy due to infection.  -Intubated due to poor mental status s/p seizures concern for possible status epilepticus and hypercarbic respiratory failure  -Potential epileptogenic focus in the right fronto-temporal region shown on VEEG  -Spoke with Neuro in regards to pt's AEDs: valproate 750mg TID, levetiracetam 1000mg TID. Load lacosamide 200 mg IV, 080H8pds for maintenance. Now off lorazepam 2mg q6.   -Neuro recs appreciated.  -Restarted patient on home olanzapine - can lower seizure threshold, will consider d/cing.   -VPA level subtherapeutic, no dosing adjustment required as per neuro  -f/u clobazam and lacosamide level  -As per neuro, pt is at baseline mental status  -EEG discontinued    CARDIOVASCULAR: No history of cardiac disease. However found to be hypotensive and bradycardic in the setting of sepsis. Pressor support improved SBPs and bradycardia.  -midodrine 10 mg q8  -phenylephrine gtt titrating for goal MAP > 60    PULMONARY: Hypothermic with lung imaging concerning for new right lung opacification. On POCUS see signs of consolidation likely aspiration PNA   - S/p intubation in MICU for poor mental status and inability to maintain airway, as well as hypercarbic respiratory failure pCO2s 50s to 70s  -Aspiration precautions  -Sputum Cx no growth  -LLB atelectasis improved on imaging vs PNA consolidation?  -s/p extubation 9/3  -Will attempt to wean off face tent  -Chest PT: albuterol, saline, metaneb q6    GI: Patient has a history of enteritis in the past but no GI symptoms currently. Eats pureed foods.  - Nutrition recs appreciated  - Aspiration precautions   - NGT placed    RENAL:   - Purewick catheter in place with clear urine output  - Replete electrolytes PRN    INFECTIOUS DISEASE:   - On admission, right lung opacification with hypothermia and leukocytosis meeting SIRS criteria with presumable source aspiration PNA complicated by hypotension 2/2 to septic shock. UA negative.   - If patient remains on high doses of pressor support will consider CT chest/abdomen/pelvis to rule out occult source of infection given also has a recent history of enteritis requiring MICU admission  -Follow up blood cultures  -s/p zosyn for aspiration pneumonia with last day 8/23  -Legionella negative  -CBCs with diff daily  -Sputum cultures showed no growth for 24h  -Pt now with increasing pressure requirement, febrile, may be in setting of new infection with sepsis - likely PNA, bedside POCUS with LLL lung consolidation  -F/u bcx, sputum cx, sputum acid fast  -on meropenem and vanc (8/31) for likely PNA. will switch jesusita to zosyn. Last day of antibiotics. (five days)    HEMATOLOGY: has thrombocytopenia but has had thrombocytopenia in the past could be 2/2 to infection  - Worsening thrombocytopenia, d/c'd heparin for now, possible cause Depakote went down on patient's Depakote dose, will trend platelets   - Platelets stable  - Macrocytic anemia, B12, folate - elevated    ENDOCRINE:  - No active issues    SKIN:  - Few blanching pressure wounds on back of feet and sacral area     DVT PPx:  - enoxaparin 40 mg subQ qd    Code Status: Full

## 2019-09-04 NOTE — EEG REPORT - THIS IS A
Continuous Video EEG

## 2019-09-04 NOTE — EEG REPORT - NS EEG TEXT BOX
Study Date: 9/3/2019 10:37   to 9/4/19 09:30  _____________________________________________________________  HISTORY    Patient is a 54y old  Female who presents with a chief complaint of Septic Shock (01 Sep 2019 10:17)  ________________________________________________________  STUDY INTERPRETATION    Findings: The background was continuous, spontaneously variable and reactive. No posterior dominant rhythm seen.      Background Slowing:  Diffuse theta and polymorphic delta slowing.    Focal Slowing:   None were present.    Sleep Background:  Drowsiness was characterized by fragmentation, attenuation, and slowing of the background activity.    Stage II sleep transients was not recorded.     Other Non-Epileptiform Findings:  None were present.    Interictal Epileptiform Activity:   None were present.    Events:  none.     Activation Procedures:   Hyperventilation was not performed.    Photic stimulation was not performed.     Artifacts:  Intermittent myogenic and movement artifacts were noted.    ECG:  The heart rate on single channel ECG was predominantly between  BPM.    _____________________________________________________________  EEG SUMMARY/CLASSIFICATION      Abnormal EEG in a sedated patient.  - Moderate generalized slowing.    _____________________________________________________________  EEG IMPRESSION/CLINICAL CORRELATE    Abnormal EEG study. Unchanged from prior 24h   1. Moderate nonspecific diffuse or multifocal cerebral dysfunction.   2. No epileptiform pattern or seizures seen.     _____________________________________________________________    Mireille Beckman DO  Epilepsy Fellow    Tex Skinner MD  EEG Attending Study Date: 9/3/2019 10:37   to 9/4/19 09:30  _____________________________________________________________  HISTORY    Patient is a 54y old  Female who presents with a chief complaint of Septic Shock (01 Sep 2019 10:17)  ________________________________________________________  STUDY INTERPRETATION    Findings: The background was continuous, spontaneously variable and reactive. No posterior dominant rhythm seen.      Background Slowing:  Diffuse theta and polymorphic delta slowing.    Focal Slowing:   None were present.    Sleep Background:  Drowsiness was characterized by fragmentation, attenuation, and slowing of the background activity.    Stage II sleep transients was not recorded.     Other Non-Epileptiform Findings:  None were present.    Interictal Epileptiform Activity:   None were present.    Events:  none.     Activation Procedures:   Hyperventilation was not performed.    Photic stimulation was not performed.     Artifacts:  Intermittent myogenic and movement artifacts were noted.    ECG:  The heart rate on single channel ECG was predominantly between  BPM.    _____________________________________________________________  EEG SUMMARY/CLASSIFICATION      Abnormal EEG in a sedated patient.  - Moderate generalized slowing.    _____________________________________________________________  EEG IMPRESSION/CLINICAL CORRELATE    Abnormal EEG study. Unchanged from prior 24h   1. Moderate nonspecific diffuse or multifocal cerebral dysfunction.   2. No epileptiform pattern or seizures seen.     Today's study demonstrated no significant change from prior 24hr recording.     _____________________________________________________________    Mireille Beckman DO  Epilepsy Fellow    Tex Skinner MD  EEG Attending

## 2019-09-05 ENCOUNTER — TRANSCRIPTION ENCOUNTER (OUTPATIENT)
Age: 54
End: 2019-09-05

## 2019-09-05 DIAGNOSIS — R13.10 DYSPHAGIA, UNSPECIFIED: ICD-10-CM

## 2019-09-05 DIAGNOSIS — Z29.9 ENCOUNTER FOR PROPHYLACTIC MEASURES, UNSPECIFIED: ICD-10-CM

## 2019-09-05 DIAGNOSIS — I95.9 HYPOTENSION, UNSPECIFIED: ICD-10-CM

## 2019-09-05 LAB
ALBUMIN SERPL ELPH-MCNC: 3 G/DL — LOW (ref 3.3–5)
ALP SERPL-CCNC: 98 U/L — SIGNIFICANT CHANGE UP (ref 40–120)
ALT FLD-CCNC: 19 U/L — SIGNIFICANT CHANGE UP (ref 4–33)
ANION GAP SERPL CALC-SCNC: 12 MMO/L — SIGNIFICANT CHANGE UP (ref 7–14)
AST SERPL-CCNC: 31 U/L — SIGNIFICANT CHANGE UP (ref 4–32)
BACTERIA BLD CULT: SIGNIFICANT CHANGE UP
BASOPHILS # BLD AUTO: 0.04 K/UL — SIGNIFICANT CHANGE UP (ref 0–0.2)
BASOPHILS NFR BLD AUTO: 0.5 % — SIGNIFICANT CHANGE UP (ref 0–2)
BILIRUB SERPL-MCNC: 0.3 MG/DL — SIGNIFICANT CHANGE UP (ref 0.2–1.2)
BUN SERPL-MCNC: 18 MG/DL — SIGNIFICANT CHANGE UP (ref 7–23)
CALCIUM SERPL-MCNC: 9.2 MG/DL — SIGNIFICANT CHANGE UP (ref 8.4–10.5)
CHLORIDE SERPL-SCNC: 101 MMOL/L — SIGNIFICANT CHANGE UP (ref 98–107)
CO2 SERPL-SCNC: 32 MMOL/L — HIGH (ref 22–31)
CREAT SERPL-MCNC: 0.82 MG/DL — SIGNIFICANT CHANGE UP (ref 0.5–1.3)
EOSINOPHIL # BLD AUTO: 0.28 K/UL — SIGNIFICANT CHANGE UP (ref 0–0.5)
EOSINOPHIL NFR BLD AUTO: 3.8 % — SIGNIFICANT CHANGE UP (ref 0–6)
GLUCOSE SERPL-MCNC: 131 MG/DL — HIGH (ref 70–99)
HCT VFR BLD CALC: 32.7 % — LOW (ref 34.5–45)
HGB BLD-MCNC: 10.1 G/DL — LOW (ref 11.5–15.5)
IMM GRANULOCYTES NFR BLD AUTO: 0.3 % — SIGNIFICANT CHANGE UP (ref 0–1.5)
LYMPHOCYTES # BLD AUTO: 2.23 K/UL — SIGNIFICANT CHANGE UP (ref 1–3.3)
LYMPHOCYTES # BLD AUTO: 30.6 % — SIGNIFICANT CHANGE UP (ref 13–44)
MAGNESIUM SERPL-MCNC: 2.1 MG/DL — SIGNIFICANT CHANGE UP (ref 1.6–2.6)
MCHC RBC-ENTMCNC: 30.9 % — LOW (ref 32–36)
MCHC RBC-ENTMCNC: 33 PG — SIGNIFICANT CHANGE UP (ref 27–34)
MCV RBC AUTO: 106.9 FL — HIGH (ref 80–100)
MONOCYTES # BLD AUTO: 1.21 K/UL — HIGH (ref 0–0.9)
MONOCYTES NFR BLD AUTO: 16.6 % — HIGH (ref 2–14)
NEUTROPHILS # BLD AUTO: 3.51 K/UL — SIGNIFICANT CHANGE UP (ref 1.8–7.4)
NEUTROPHILS NFR BLD AUTO: 48.2 % — SIGNIFICANT CHANGE UP (ref 43–77)
NRBC # FLD: 0 K/UL — SIGNIFICANT CHANGE UP (ref 0–0)
PHOSPHATE SERPL-MCNC: 5.2 MG/DL — HIGH (ref 2.5–4.5)
PLATELET # BLD AUTO: 259 K/UL — SIGNIFICANT CHANGE UP (ref 150–400)
PMV BLD: 8.7 FL — SIGNIFICANT CHANGE UP (ref 7–13)
POTASSIUM SERPL-MCNC: 4.2 MMOL/L — SIGNIFICANT CHANGE UP (ref 3.5–5.3)
POTASSIUM SERPL-SCNC: 4.2 MMOL/L — SIGNIFICANT CHANGE UP (ref 3.5–5.3)
PROT SERPL-MCNC: 7.1 G/DL — SIGNIFICANT CHANGE UP (ref 6–8.3)
RBC # BLD: 3.06 M/UL — LOW (ref 3.8–5.2)
RBC # FLD: 15 % — HIGH (ref 10.3–14.5)
SODIUM SERPL-SCNC: 145 MMOL/L — SIGNIFICANT CHANGE UP (ref 135–145)
WBC # BLD: 7.29 K/UL — SIGNIFICANT CHANGE UP (ref 3.8–10.5)
WBC # FLD AUTO: 7.29 K/UL — SIGNIFICANT CHANGE UP (ref 3.8–10.5)

## 2019-09-05 PROCEDURE — 99231 SBSQ HOSP IP/OBS SF/LOW 25: CPT | Mod: GC

## 2019-09-05 PROCEDURE — 71045 X-RAY EXAM CHEST 1 VIEW: CPT | Mod: 26

## 2019-09-05 PROCEDURE — 99232 SBSQ HOSP IP/OBS MODERATE 35: CPT | Mod: GC

## 2019-09-05 RX ORDER — MIDODRINE HYDROCHLORIDE 2.5 MG/1
10 TABLET ORAL EVERY 8 HOURS
Refills: 0 | Status: DISCONTINUED | OUTPATIENT
Start: 2019-09-05 | End: 2019-09-07

## 2019-09-05 RX ORDER — LACOSAMIDE 50 MG/1
100 TABLET ORAL
Refills: 0 | Status: DISCONTINUED | OUTPATIENT
Start: 2019-09-05 | End: 2019-09-12

## 2019-09-05 RX ORDER — SODIUM CHLORIDE 9 MG/ML
500 INJECTION INTRAMUSCULAR; INTRAVENOUS; SUBCUTANEOUS ONCE
Refills: 0 | Status: COMPLETED | OUTPATIENT
Start: 2019-09-05 | End: 2019-09-05

## 2019-09-05 RX ORDER — CLOBAZAM 10 MG/1
10 TABLET ORAL
Refills: 0 | Status: DISCONTINUED | OUTPATIENT
Start: 2019-09-05 | End: 2019-09-12

## 2019-09-05 RX ADMIN — CHLORHEXIDINE GLUCONATE 1 APPLICATION(S): 213 SOLUTION TOPICAL at 08:58

## 2019-09-05 RX ADMIN — SODIUM CHLORIDE 4 MILLILITER(S): 9 INJECTION INTRAMUSCULAR; INTRAVENOUS; SUBCUTANEOUS at 15:13

## 2019-09-05 RX ADMIN — Medication 28.75 MILLIGRAM(S): at 12:20

## 2019-09-05 RX ADMIN — MIDODRINE HYDROCHLORIDE 10 MILLIGRAM(S): 2.5 TABLET ORAL at 14:49

## 2019-09-05 RX ADMIN — SODIUM CHLORIDE 4 MILLILITER(S): 9 INJECTION INTRAMUSCULAR; INTRAVENOUS; SUBCUTANEOUS at 22:29

## 2019-09-05 RX ADMIN — ALBUTEROL 2.5 MILLIGRAM(S): 90 AEROSOL, METERED ORAL at 04:39

## 2019-09-05 RX ADMIN — ENOXAPARIN SODIUM 40 MILLIGRAM(S): 100 INJECTION SUBCUTANEOUS at 12:20

## 2019-09-05 RX ADMIN — CLOBAZAM 10 MILLIGRAM(S): 10 TABLET ORAL at 05:20

## 2019-09-05 RX ADMIN — SODIUM CHLORIDE 1000 MILLILITER(S): 9 INJECTION INTRAMUSCULAR; INTRAVENOUS; SUBCUTANEOUS at 05:21

## 2019-09-05 RX ADMIN — CLOBAZAM 10 MILLIGRAM(S): 10 TABLET ORAL at 21:37

## 2019-09-05 RX ADMIN — Medication 28.75 MILLIGRAM(S): at 19:11

## 2019-09-05 RX ADMIN — ALBUTEROL 2.5 MILLIGRAM(S): 90 AEROSOL, METERED ORAL at 22:28

## 2019-09-05 RX ADMIN — LACOSAMIDE 100 MILLIGRAM(S): 50 TABLET ORAL at 14:49

## 2019-09-05 RX ADMIN — LACOSAMIDE 100 MILLIGRAM(S): 50 TABLET ORAL at 23:29

## 2019-09-05 RX ADMIN — MIDODRINE HYDROCHLORIDE 20 MILLIGRAM(S): 2.5 TABLET ORAL at 05:18

## 2019-09-05 RX ADMIN — OLANZAPINE 7.5 MILLIGRAM(S): 15 TABLET, FILM COATED ORAL at 23:03

## 2019-09-05 RX ADMIN — ALBUTEROL 2.5 MILLIGRAM(S): 90 AEROSOL, METERED ORAL at 15:13

## 2019-09-05 RX ADMIN — LACOSAMIDE 100 MILLIGRAM(S): 50 TABLET ORAL at 05:17

## 2019-09-05 RX ADMIN — MIDODRINE HYDROCHLORIDE 10 MILLIGRAM(S): 2.5 TABLET ORAL at 23:03

## 2019-09-05 RX ADMIN — LEVETIRACETAM 400 MILLIGRAM(S): 250 TABLET, FILM COATED ORAL at 21:20

## 2019-09-05 RX ADMIN — Medication 250 MILLIGRAM(S): at 05:18

## 2019-09-05 RX ADMIN — LEVETIRACETAM 400 MILLIGRAM(S): 250 TABLET, FILM COATED ORAL at 14:49

## 2019-09-05 RX ADMIN — Medication 28.75 MILLIGRAM(S): at 01:24

## 2019-09-05 RX ADMIN — LEVETIRACETAM 400 MILLIGRAM(S): 250 TABLET, FILM COATED ORAL at 08:59

## 2019-09-05 RX ADMIN — ALBUTEROL 2.5 MILLIGRAM(S): 90 AEROSOL, METERED ORAL at 09:15

## 2019-09-05 RX ADMIN — SODIUM CHLORIDE 4 MILLILITER(S): 9 INJECTION INTRAMUSCULAR; INTRAVENOUS; SUBCUTANEOUS at 09:15

## 2019-09-05 NOTE — DISCHARGE NOTE PROVIDER - CARE PROVIDER_API CALL
Primary care provider,   Phone: (   )    -  Fax: (   )    -  Follow Up Time: Primary care provider,   Phone: (   )    -  Fax: (   )    -  Follow Up Time:     [facility],   Phone: [phone]  Fax: (   )    -  Follow Up Time:

## 2019-09-05 NOTE — PROGRESS NOTE ADULT - SUBJECTIVE AND OBJECTIVE BOX
CHIEF COMPLAINT: Patient is a 54y old  Female who presents with a chief complaint of Septic Shock (04 Sep 2019 06:55)    Interval Events:      REVIEW OF SYSTEMS:  Constitutional:   Eyes:  ENT:  CV:  Resp:  GI:  :  MSK:  Integumentary:  Neurological:  Psychiatric:  Endocrine:  Hematologic/Lymphatic:  Allergic/Immunologic:  [ ] All other systems negative  [ ] Unable to assess ROS because ________      OBJECTIVE:  ICU Vital Signs Last 24 Hrs  T(C): 35 (05 Sep 2019 04:00), Max: 37.2 (04 Sep 2019 07:30)  T(F): 95 (05 Sep 2019 04:00), Max: 98.9 (04 Sep 2019 07:30)  HR: 66 (05 Sep 2019 06:39) (45 - 105)  BP: 113/52 (05 Sep 2019 06:00) (75/34 - 127/67)  BP(mean): 71 (05 Sep 2019 06:00) (47 - 94)  ABP: --  ABP(mean): --  RR: 20 (05 Sep 2019 06:00) (17 - 29)  SpO2: 91% (05 Sep 2019 06:39) (91% - 100%)    09-04 @ 07:01  -  09-05 @ 07:00  --------------------------------------------------------  IN: 2452.8 mL / OUT: 500 mL / NET: 1952.8 mL    HOSPITAL MEDICATIONS:  MEDICATIONS  (STANDING):  ALBUTerol    0.083% 2.5 milliGRAM(s) Nebulizer every 6 hours  ALBUTerol    90 MICROgram(s) HFA Inhaler 1 Puff(s) Inhalation every 4 hours  chlorhexidine 4% Liquid 1 Application(s) Topical <User Schedule>  Clobazam Suspension 10 milliGRAM(s) Oral two times a day  enoxaparin Injectable 40 milliGRAM(s) SubCutaneous daily  lacosamide Solution 100 milliGRAM(s) Oral <User Schedule>  levETIRAcetam  IVPB 1000 milliGRAM(s) IV Intermittent <User Schedule>  midodrine 20 milliGRAM(s) Oral every 8 hours  OLANZapine 7.5 milliGRAM(s) Oral at bedtime  sodium chloride 3%  Inhalation 4 milliLiter(s) Inhalation every 6 hours  valproate sodium IVPB 750 milliGRAM(s) IV Intermittent every 8 hours    MEDICATIONS  (PRN):  sodium chloride 0.9% lock flush 10 milliLiter(s) IV Push every 1 hour PRN Pre/post blood products, medications, blood draw, and to maintain line patency      LABS:                        10.1   7.29  )-----------( 259      ( 05 Sep 2019 05:10 )             32.7     09-05    145  |  101  |  18  ----------------------------<  131<H>  4.2   |  32<H>  |  0.82    Ca    9.2      05 Sep 2019 05:10  Phos  5.2     09-05  Mg     2.1     09-05    TPro  7.1  /  Alb  3.0<L>  /  TBili  0.3  /  DBili  x   /  AST  31  /  ALT  19  /  AlkPhos  98  09-05              MICROBIOLOGY:     RADIOLOGY:  [ ] Reviewed and interpreted by me    PULMONARY FUNCTION TESTS:    EKG: CHIEF COMPLAINT: Patient is a 54y old  Female who presents with a chief complaint of Septic Shock (04 Sep 2019 06:55)    Interval Events: tx from MICU this am       REVIEW OF SYSTEMS:  [ ] All other systems negative  [x] Unable to assess ROS because: nonverbal      OBJECTIVE:  ICU Vital Signs Last 24 Hrs  T(C): 35 (05 Sep 2019 04:00), Max: 37.2 (04 Sep 2019 07:30)  T(F): 95 (05 Sep 2019 04:00), Max: 98.9 (04 Sep 2019 07:30)  HR: 66 (05 Sep 2019 06:39) (45 - 105)  BP: 113/52 (05 Sep 2019 06:00) (75/34 - 127/67)  BP(mean): 71 (05 Sep 2019 06:00) (47 - 94)  ABP: --  ABP(mean): --  RR: 20 (05 Sep 2019 06:00) (17 - 29)  SpO2: 91% (05 Sep 2019 06:39) (91% - 100%)    09-04 @ 07:01  -  09-05 @ 07:00  --------------------------------------------------------  IN: 2452.8 mL / OUT: 500 mL / NET: 1952.8 mL    HOSPITAL MEDICATIONS:  MEDICATIONS  (STANDING):  ALBUTerol    0.083% 2.5 milliGRAM(s) Nebulizer every 6 hours  ALBUTerol    90 MICROgram(s) HFA Inhaler 1 Puff(s) Inhalation every 4 hours  chlorhexidine 4% Liquid 1 Application(s) Topical <User Schedule>  Clobazam Suspension 10 milliGRAM(s) Oral two times a day  enoxaparin Injectable 40 milliGRAM(s) SubCutaneous daily  lacosamide Solution 100 milliGRAM(s) Oral <User Schedule>  levETIRAcetam  IVPB 1000 milliGRAM(s) IV Intermittent <User Schedule>  midodrine 20 milliGRAM(s) Oral every 8 hours  OLANZapine 7.5 milliGRAM(s) Oral at bedtime  sodium chloride 3%  Inhalation 4 milliLiter(s) Inhalation every 6 hours  valproate sodium IVPB 750 milliGRAM(s) IV Intermittent every 8 hours    MEDICATIONS  (PRN):  sodium chloride 0.9% lock flush 10 milliLiter(s) IV Push every 1 hour PRN Pre/post blood products, medications, blood draw, and to maintain line patency      LABS:                        10.1   7.29  )-----------( 259      ( 05 Sep 2019 05:10 )             32.7     09-05    145  |  101  |  18  ----------------------------<  131<H>  4.2   |  32<H>  |  0.82    Ca    9.2      05 Sep 2019 05:10  Phos  5.2     09-05  Mg     2.1     09-05    TPro  7.1  /  Alb  3.0<L>  /  TBili  0.3  /  DBili  x   /  AST  31  /  ALT  19  /  AlkPhos  98  09-05        MICROBIOLOGY:     Culture - Acid Fast - Sputum w/Smear . (08.31.19 @ 17:03)    Culture - Acid Fast Smear Concentrated:   AFB SMEAR= NO ACID FAST BACILLI SEEN    Specimen Source: SPUTUM    Culture - Blood (08.31.19 @ 11:08)    Culture - Blood:   NO ORGANISMS ISOLATED    Specimen Source: BLOOD VENOUS    Culture - Respiratory with Gram Stain (08.19.19 @ 03:10)    Culture - Respiratory:   NRF^Normal Respiratory Charlette  QUANTITY OF GROWTH: RARE    Culture - Respiratory:   FEW  NRF^Normal Respiratory [place]  QUANTITY OF GROWTH: RARE    Gram Stain Sputum:   NOS^No Organisms Seen  WBC^White Blood Cells  QNTY CELLS IN GRAM STAIN: MANY (4+)    Specimen Source: TRACHEAL ASPIRATE

## 2019-09-05 NOTE — PROGRESS NOTE ADULT - ASSESSMENT
54F with a PMH of cerebral palsy and seizure disorder presenting from [facility] home for acute AMS and increased work of breathing with recent seizure found to be hypothermic, hypotensive and bradycardic with leukocytosis and imaging showing new right lung opacification concerning for septic shock 2/2 to aspiration PNA requiring pressors and airway support, intermittently requiring pressors, seizures controlled with 4 AEDs.   Now extubated, off pressors, on midodrine and face tent.

## 2019-09-05 NOTE — DISCHARGE NOTE PROVIDER - INSTRUCTIONS
Jevity 1.2 Shady (JEVITY1.2RTH)  Total Volume for 24 Hours (mL): 1200mL  Continuous  Starting Tube Feed Rate {mL per Hour}: 10mL  Increase Tube Feed Rate by (mL): 10mL q4h  Until Goal Tube Feed Rate (mL per Hour): 50mL  Tube Feed Duration (in Hours): 24  No Carb Prosource (1pkg = 15gms Protein): 1/day Jevity 1.2 Shady (JEVITY1.2RTH) 50 mL per hour with No Carb Prosource daily

## 2019-09-05 NOTE — DISCHARGE NOTE PROVIDER - NSDCFUADDINST_GEN_ALL_CORE_FT
**Patient's head of bed should be elevated to 35 - 45 degrees during feeds and for 1 - 2 hours after feeds. ** Patient's head of bed should be elevated to 35 - 45 degrees during feeds and for 1 - 2 hours after feeds **

## 2019-09-05 NOTE — DISCHARGE NOTE PROVIDER - NSDCCPCAREPLAN_GEN_ALL_CORE_FT
PRINCIPAL DISCHARGE DIAGNOSIS  Diagnosis: Acute respiratory failure with hypercapnia  Assessment and Plan of Treatment: Required intubation for pneumonia.  Completed IV antibiotics while in the hospital.      SECONDARY DISCHARGE DIAGNOSES  Diagnosis: Seizure  Assessment and Plan of Treatment: Seizures while in the hospital - now controlled.  Continue current medications.    Diagnosis: Hypotension  Assessment and Plan of Treatment: Hypotension now resolved - continue midodrine-------------- PRINCIPAL DISCHARGE DIAGNOSIS  Diagnosis: Acute respiratory failure with hypercapnia  Assessment and Plan of Treatment: Required intubation for pneumonia.  Completed IV antibiotics while in the hospital.      SECONDARY DISCHARGE DIAGNOSES  Diagnosis: Seizure  Assessment and Plan of Treatment: Seizures while in the hospital - now controlled.  Continue current medications.    Diagnosis: Dysphagia  Assessment and Plan of Treatment: Failed swallow test-------------    Diagnosis: Hypotension  Assessment and Plan of Treatment: Hypotension now resolved.  Blood pressure stable. PRINCIPAL DISCHARGE DIAGNOSIS  Diagnosis: Acute respiratory failure with hypercapnia  Assessment and Plan of Treatment: Likely secondary to aspiration pneumonia. Patient required intubation and MICU admission for hemodynamic and respiratory support. Patient completed a full course of IV antibiotics while in the hospital and vitals remain stable at time of discharge.      SECONDARY DISCHARGE DIAGNOSES  Diagnosis: Dysphagia  Assessment and Plan of Treatment: Patient failed a formal speech and swalloe evaluation. Per family's goals of care, a PEG tube was placed on 9/17. Nutrition was consulted for dietary recommendations.  **Please continue feeds as follows: Jevity 1.2 RTH, start feeds @ 10mL/hr and increase by 10 every 4 hours with a goal rate of 50mL/hr for toal daily volume of 1200mL; Prosouce once per day.  **Please maintain patient's head of bed between 35 - 45 degrees during feeds and for at least 1 hour after feeds.    Diagnosis: Hypotension  Assessment and Plan of Treatment: Patient initially required pressors for blood pressure support. However, with resolution of pneumonia, hypotension resolved. BP now stable at time of discharge.    Diagnosis: Seizure  Assessment and Plan of Treatment: Patient has a known history of seizures. While in the hospital patient had a seizure episode. Neurology was consulted and anti-seizure medications were adjusted accordingly. Patient was also start on Onfi (clobazam) 10mg BID. Prior authorization not need per pharmacy and will be covered with no copay --> 30 day supply ordered and sent with patient.    Diagnosis: Cerebral palsy, unspecified type  Assessment and Plan of Treatment: Patient remains at baseline functional status per collateral with family and group home staff. Please continue to monitor. PRINCIPAL DISCHARGE DIAGNOSIS  Diagnosis: Acute respiratory failure with hypercapnia  Assessment and Plan of Treatment: Likely secondary to aspiration pneumonia. You required intubation and MICU admission for hemodynamic and respiratory support. You were successfully extubated and with improving respiratory status. You have also completed a course of antibiotics in-hospital given concern for pneumonia. Monitor for fevers, chills, shortness of breath, chest pain, abdominal pain.      SECONDARY DISCHARGE DIAGNOSES  Diagnosis: Cerebral palsy, unspecified type  Assessment and Plan of Treatment: Continue with assistance with activities of daily living. Follow up outpatient PCP in 1-2 weeks for further management. Continue with physical therapy as recommended by your therapist to improve in gait and mobility.    Diagnosis: Dysphagia  Assessment and Plan of Treatment: Patient failed a formal speech and swalloe evaluation. Per family's goals of care, a PEG tube was placed on 9/17. You had a PEG placed on 9/17 **Please continue feeds as follows: Jevity 1.2 50mL/hr daily with Prosouce once per day **Please maintain head of bed between 35 - 45 degrees during feeds and for at least 1 hour after feeds.    Diagnosis: Hypotension  Assessment and Plan of Treatment: Resolved. Monitor blood pressure routinely. Follow up outpatient PCP in 1-2 weeks for further management and recommendations.    Diagnosis: Seizure  Assessment and Plan of Treatment: You were seen by Neurology in-hospital. Continue Clobazam, Lacosamide, Keppra and valproic acid as recommended. Follow up outpatient PCP in 1-2 weeks for further management and management of seizure medications.

## 2019-09-05 NOTE — PROGRESS NOTE ADULT - PROBLEM SELECTOR PLAN 2
- with concern for possible status in MICU  - VEEG with potential epileptogenic focus in the right fronto-temporal region  - no further seizure activiy noted  - cont AEDs as ordered - with concern for possible status in MICU  - VEEG with potential epileptogenic focus in the right fronto-temporal region  - no further seizure activity noted  - cont AEDs as ordered

## 2019-09-05 NOTE — SWALLOW BEDSIDE ASSESSMENT ADULT - COMMENTS
Pt received upright in bed, awake, with supplemental O2 via 3.5L NC. Pt was intubated 8/18, extubated to face tent 9/3, now tolerating NC. Pt w/o vocalizations/verbalizations throughout session. Pt did not follow commands despite multimodal cues. Pt received upright in bed, awake, with supplemental O2 via 3.5L NC. Pt was intubated 8/18, extubated to face tent 9/3, now tolerating NC 9/5. Pt w/o vocalizations/verbalizations throughout session. Pt did not follow commands despite multimodal cues.    CXR 9/3 revealed: "Left hemidiaphragm remains obscured perhaps small layering effusion. Left upper lobe and right lung are clear"    Pt known to SLP services from previous admissions, with most recent cinesophagram completed on 10/29/2018, at which time pt was recommended puree with honey thick liquids (please reference report for details).

## 2019-09-05 NOTE — CHART NOTE - NSCHARTNOTEFT_GEN_A_CORE
22:50  -  Called by RN for temp 93.1 rectally.  /63.  Pt mental status at baseline. No acute changes noted.  WBC wnl.     -hyperthermia blanket applied    02:45 -  Pt remains hypothermic, now bp 80/56.  Sepsis protocol initiated    Vital Signs Last 24 Hrs  T(C): 35.2 (06 Sep 2019 03:00), Max: 35.2 (06 Sep 2019 03:00)  T(F): 95.3 (06 Sep 2019 03:00), Max: 95.3 (06 Sep 2019 03:00)  HR: 78 (06 Sep 2019 02:20) (45 - 78)  BP: 80/56 (06 Sep 2019 02:20) (80/56 - 113/52)  BP(mean): 71 (05 Sep 2019 06:00) (64 - 71)  RR: 20 (06 Sep 2019 02:20) (18 - 21)  SpO2: 100% (06 Sep 2019 02:20) (91% - 100%)    - STAT labs sent  - Blood culture x 1  - urinalysis ___  - sputum culture ___  - hyperthermia blanket remains on   - ABG ___ 22:50  -  Called by RN for temp 93.1 rectally.  /63.  Pt mental status at baseline. No acute changes noted.  WBC wnl.     -hyperthermia blanket applied    02:45 -  Pt remains hypothermic, now bp 80/56 despite being on midodrine.   Sepsis protocol initiated.    Vital Signs Last 24 Hrs  T(C): 35.2 (06 Sep 2019 03:00), Max: 35.2 (06 Sep 2019 03:00)  T(F): 95.3 (06 Sep 2019 03:00), Max: 95.3 (06 Sep 2019 03:00)  HR: 78 (06 Sep 2019 02:20) (45 - 78)  BP: 80/56 (06 Sep 2019 02:20) (80/56 - 113/52)  BP(mean): 71 (05 Sep 2019 06:00) (64 - 71)  RR: 20 (06 Sep 2019 02:20) (18 - 21)  SpO2: 100% (06 Sep 2019 02:20) (91% - 100%)    - STAT labs sent  - Blood culture x 1  - urinalysis ___  - sputum culture ___  - hyperthermia blanket remains on   - ABG ___  - NS bolus given  - will continue to monitor

## 2019-09-05 NOTE — PROGRESS NOTE ADULT - ATTENDING COMMENTS
tx from micu  h/o CP, aspiration pna, seizures  at baseline mental status  speech and swallow eval, currently with NGT

## 2019-09-05 NOTE — CHART NOTE - NSCHARTNOTEFT_GEN_A_CORE
MICU Transfer Note    Transfer from: MICU  Transfer to:  (  ) Medicine    (  ) Telemetry    ( x ) RCU    (  ) Palliative    (  ) Stroke Unit    (  ) _______________  Accepting physician:    MICU COURSE:  55 yo F with hx cerebral palsy and seizure disorder presented from [facility] for acute AMS and increased work of breathing with recent seizure, found to be hypothermic, hypotensive and bradycardic with leukocytosis and imaging showing new lung opacification, concerning for septic shock 2/2 to aspiration PNA. She is requiring pressors and airway support (2/2 status epilepticus), intermittently requiring pressors, seizures controlled with 3 AEDs.     # AMS, hx seizure d/o  Baseline patient minimally verbal. Intubated due to lethargy and s/p seizures, extubated 9/3/19. Metabolic encephalopathy 2/2 infection (PNA) likely. On EEG, potential epileptogenic focus in the right fronto-temporal region. Neurology following, rec AED regimen of valproic acid, levetiracetam and lacosamide (previously also on lorazepam). Also on home olanzapine.    # Hypotension  Hypotensive and bradycardic i/s/o sepsis, responded well to fluids, norepinephrine and midodrine. SBPs over last day prior to transfer ranging from 110s to mid-90s.  Continuing on midodrine 10 mg q8h and phenylephrine gtt for MAP titration > 60 mmHg.    # Pneumonia  Chest XR and bedside ultrasound c/f LLL consolidation c/w likely PNA (aspiration). Intubated due to poor mental status and hypercarbic respiratory failure (pCO2 50 to 70 range). Sputum culture without growth. Continuing on vancomycin and Zosyn for aspiration PNA (previously on meropenem and vancomycin, then levofloxacin and cefepime prior).    ASSESSMENT & PLAN:   55 yo F with hx cerebral palsy and seizure disorder presented from [facility] for acute AMS and increased work of breathing with recent seizure, admitted for septic shock 2/2 aspiration PNA, s/p antibiotic course and pressors. Patient's blood pressures stabilized at higher level on 24 hours prior to transfer, but still requires intermittent pressor support.    For Follow-Up:  [ ] F/u clobazam and lacosamide levels  [ ] Attempt to wean off face tent  [ ] F/u blood/sputum cultures    *****************************************************  Vital Signs Last 24 Hrs  T(C): 35.3 (05 Sep 2019 00:00), Max: 37.2 (04 Sep 2019 07:30)  T(F): 95.5 (05 Sep 2019 00:00), Max: 98.9 (04 Sep 2019 07:30)  HR: 45 (05 Sep 2019 04:41) (45 - 105)  BP: 94/51 (05 Sep 2019 04:00) (75/34 - 127/67)  BP(mean): 64 (05 Sep 2019 04:00) (47 - 94)  RR: 21 (05 Sep 2019 04:00) (17 - 29)  SpO2: 100% (05 Sep 2019 04:41) (93% - 100%)  I&O's Summary    03 Sep 2019 07:01  -  04 Sep 2019 07:00  --------------------------------------------------------  IN: 826.3 mL / OUT: 1800 mL / NET: -973.7 mL    04 Sep 2019 07:01  -  05 Sep 2019 05:40  --------------------------------------------------------  IN: 1652.8 mL / OUT: 300 mL / NET: 1352.8 mL    MEDICATIONS  (STANDING):  ALBUTerol    0.083% 2.5 milliGRAM(s) Nebulizer every 6 hours  ALBUTerol    90 MICROgram(s) HFA Inhaler 1 Puff(s) Inhalation every 4 hours  chlorhexidine 4% Liquid 1 Application(s) Topical <User Schedule>  Clobazam Suspension 10 milliGRAM(s) Oral two times a day  enoxaparin Injectable 40 milliGRAM(s) SubCutaneous daily  lacosamide Solution 100 milliGRAM(s) Oral <User Schedule>  levETIRAcetam  IVPB 1000 milliGRAM(s) IV Intermittent <User Schedule>  midodrine 20 milliGRAM(s) Oral every 8 hours  OLANZapine 7.5 milliGRAM(s) Oral at bedtime  phenylephrine    Infusion 1 MICROgram(s)/kG/Min (12.806 mL/Hr) IV Continuous <Continuous>  piperacillin/tazobactam IVPB.. 3.375 Gram(s) IV Intermittent every 8 hours  sodium chloride 3%  Inhalation 4 milliLiter(s) Inhalation every 6 hours  valproate sodium IVPB 750 milliGRAM(s) IV Intermittent every 8 hours  vancomycin  IVPB      vancomycin  IVPB 1000 milliGRAM(s) IV Intermittent every 12 hours    MEDICATIONS  (PRN):  sodium chloride 0.9% lock flush 10 milliLiter(s) IV Push every 1 hour PRN Pre/post blood products, medications, blood draw, and to maintain line patency MICU Transfer Note    Transfer from: MICU  Transfer to:  (  ) Medicine    (  ) Telemetry    ( x ) RCU    (  ) Palliative    (  ) Stroke Unit    (  ) _______________  Accepting physician:    MICU COURSE:  53 yo F with hx cerebral palsy and seizure disorder presented [facility] for acute AMS and increased work of breathing with recent seizure, found to be hypothermic, hypotensive and bradycardic with leukocytosis and imaging showing new lung opacification, concerning for septic shock 2/2 to aspiration PNA. She is requiring pressors and airway support (2/2 status epilepticus), intermittently requiring pressors, seizures controlled with 3 AEDs.     # AMS, hx seizure d/o  Baseline patient minimally verbal. Intubated due to lethargy and s/p seizures, extubated 9/3/19. Metabolic encephalopathy 2/2 infection (PNA) likely. On EEG, potential epileptogenic focus in the right fronto-temporal region. Neurology following, rec AED regimen of valproic acid, levetiracetam and lacosamide (previously also on lorazepam). Also on home olanzapine.    # Hypotension  Hypotensive and bradycardic i/s/o sepsis, responded well to fluids, norepinephrine and midodrine. SBPs over last day prior to transfer ranging from 110s to mid-90s.  Continuing on midodrine 10 mg q8h and phenylephrine gtt for MAP titration > 60 mmHg.    # Pneumonia  Chest XR and bedside ultrasound c/f LLL consolidation c/w likely PNA (aspiration). Intubated due to poor mental status and hypercarbic respiratory failure (pCO2 50 to 70 range). Sputum culture without growth. s/p completion of vancomycin and Zosyn for aspiration PNA (finished 9/4/19, previously on meropenem and vancomycin, then levofloxacin and cefepime prior).    ASSESSMENT & PLAN:   53 yo F with hx cerebral palsy and seizure disorder presented from intermediate for acute AMS and increased work of breathing with recent seizure, admitted for septic shock 2/2 aspiration PNA, s/p antibiotic course and pressors. Patient's blood pressures stabilized at higher level on 24 hours prior to transfer, but still requires intermittent pressor support.    For Follow-Up:  [ ] F/u clobazam and lacosamide levels  [ ] Attempt to wean off face tent  [ ] F/u blood/sputum cultures    *****************************************************  Vital Signs Last 24 Hrs  T(C): 35.3 (05 Sep 2019 00:00), Max: 37.2 (04 Sep 2019 07:30)  T(F): 95.5 (05 Sep 2019 00:00), Max: 98.9 (04 Sep 2019 07:30)  HR: 45 (05 Sep 2019 04:41) (45 - 105)  BP: 94/51 (05 Sep 2019 04:00) (75/34 - 127/67)  BP(mean): 64 (05 Sep 2019 04:00) (47 - 94)  RR: 21 (05 Sep 2019 04:00) (17 - 29)  SpO2: 100% (05 Sep 2019 04:41) (93% - 100%)  I&O's Summary    03 Sep 2019 07:01  -  04 Sep 2019 07:00  --------------------------------------------------------  IN: 826.3 mL / OUT: 1800 mL / NET: -973.7 mL    04 Sep 2019 07:01  -  05 Sep 2019 05:40  --------------------------------------------------------  IN: 1652.8 mL / OUT: 300 mL / NET: 1352.8 mL    MEDICATIONS  (STANDING):  ALBUTerol    0.083% 2.5 milliGRAM(s) Nebulizer every 6 hours  ALBUTerol    90 MICROgram(s) HFA Inhaler 1 Puff(s) Inhalation every 4 hours  chlorhexidine 4% Liquid 1 Application(s) Topical <User Schedule>  Clobazam Suspension 10 milliGRAM(s) Oral two times a day  enoxaparin Injectable 40 milliGRAM(s) SubCutaneous daily  lacosamide Solution 100 milliGRAM(s) Oral <User Schedule>  levETIRAcetam  IVPB 1000 milliGRAM(s) IV Intermittent <User Schedule>  midodrine 20 milliGRAM(s) Oral every 8 hours  OLANZapine 7.5 milliGRAM(s) Oral at bedtime  phenylephrine    Infusion 1 MICROgram(s)/kG/Min (12.806 mL/Hr) IV Continuous <Continuous>  piperacillin/tazobactam IVPB.. 3.375 Gram(s) IV Intermittent every 8 hours  sodium chloride 3%  Inhalation 4 milliLiter(s) Inhalation every 6 hours  valproate sodium IVPB 750 milliGRAM(s) IV Intermittent every 8 hours  vancomycin  IVPB      vancomycin  IVPB 1000 milliGRAM(s) IV Intermittent every 12 hours    MEDICATIONS  (PRN):  sodium chloride 0.9% lock flush 10 milliLiter(s) IV Push every 1 hour PRN Pre/post blood products, medications, blood draw, and to maintain line patency MICU Transfer Note    Transfer from: MICU  Transfer to:  (  ) Medicine    (  ) Telemetry    ( x ) RCU    (  ) Palliative    (  ) Stroke Unit    (  ) _______________  Accepting physician:    MICU COURSE:  55 yo F with hx cerebral palsy and seizure disorder presented from senior living for acute AMS and increased work of breathing with recent seizure, found to be hypothermic, hypotensive and bradycardic with leukocytosis and imaging showing new lung opacification, concerning for septic shock 2/2 to aspiration PNA. She is requiring pressors and airway support (2/2 status epilepticus), intermittently requiring pressors, seizures controlled with 3 AEDs.     # AMS, hx seizure d/o  Baseline patient minimally verbal. Intubated due to lethargy and s/p seizures, extubated 9/3/19. Metabolic encephalopathy 2/2 infection (PNA) likely. On EEG, potential epileptogenic focus in the right fronto-temporal region. Neurology following, rec AED regimen of valproic acid, levetiracetam and lacosamide (previously also on lorazepam). Also on home olanzapine.    # Hypotension  Hypotensive and bradycardic i/s/o sepsis, responded well to fluids, norepinephrine and midodrine. SBPs over last day prior to transfer ranging from 110s to mid-90s.  Continuing on midodrine 10 mg q8h and phenylephrine gtt for MAP titration > 60 mmHg.    # Pneumonia  Chest XR and bedside ultrasound c/f LLL consolidation c/w likely PNA (aspiration). Intubated due to poor mental status and hypercarbic respiratory failure (pCO2 50 to 70 range). Sputum culture without growth. s/p completion of vancomycin and Zosyn for aspiration PNA (finished 9/4/19, previously on meropenem and vancomycin, then levofloxacin and cefepime prior).    ASSESSMENT & PLAN:   55 yo F with hx cerebral palsy and seizure disorder presented from senior living for acute AMS and increased work of breathing with recent seizure, admitted for septic shock 2/2 aspiration PNA, s/p antibiotic course and pressors. Patient's blood pressures stabilized at higher level on 24 hours prior to transfer.     For Follow-Up:  [ ] F/u clobazam and lacosamide levels  [ ] Attempt to wean off face tent  [ ] F/u blood/sputum cultures    *****************************************************  Vital Signs Last 24 Hrs  T(C): 35.3 (05 Sep 2019 00:00), Max: 37.2 (04 Sep 2019 07:30)  T(F): 95.5 (05 Sep 2019 00:00), Max: 98.9 (04 Sep 2019 07:30)  HR: 45 (05 Sep 2019 04:41) (45 - 105)  BP: 94/51 (05 Sep 2019 04:00) (75/34 - 127/67)  BP(mean): 64 (05 Sep 2019 04:00) (47 - 94)  RR: 21 (05 Sep 2019 04:00) (17 - 29)  SpO2: 100% (05 Sep 2019 04:41) (93% - 100%)  I&O's Summary    03 Sep 2019 07:01  -  04 Sep 2019 07:00  --------------------------------------------------------  IN: 826.3 mL / OUT: 1800 mL / NET: -973.7 mL    04 Sep 2019 07:01  -  05 Sep 2019 05:40  --------------------------------------------------------  IN: 1652.8 mL / OUT: 300 mL / NET: 1352.8 mL    MEDICATIONS  (STANDING):  ALBUTerol    0.083% 2.5 milliGRAM(s) Nebulizer every 6 hours  ALBUTerol    90 MICROgram(s) HFA Inhaler 1 Puff(s) Inhalation every 4 hours  chlorhexidine 4% Liquid 1 Application(s) Topical <User Schedule>  Clobazam Suspension 10 milliGRAM(s) Oral two times a day  enoxaparin Injectable 40 milliGRAM(s) SubCutaneous daily  lacosamide Solution 100 milliGRAM(s) Oral <User Schedule>  levETIRAcetam  IVPB 1000 milliGRAM(s) IV Intermittent <User Schedule>  midodrine 20 milliGRAM(s) Oral every 8 hours  OLANZapine 7.5 milliGRAM(s) Oral at bedtime  phenylephrine    Infusion 1 MICROgram(s)/kG/Min (12.806 mL/Hr) IV Continuous <Continuous>  piperacillin/tazobactam IVPB.. 3.375 Gram(s) IV Intermittent every 8 hours  sodium chloride 3%  Inhalation 4 milliLiter(s) Inhalation every 6 hours  valproate sodium IVPB 750 milliGRAM(s) IV Intermittent every 8 hours  vancomycin  IVPB      vancomycin  IVPB 1000 milliGRAM(s) IV Intermittent every 12 hours    MEDICATIONS  (PRN):  sodium chloride 0.9% lock flush 10 milliLiter(s) IV Push every 1 hour PRN Pre/post blood products, medications, blood draw, and to maintain line patency

## 2019-09-05 NOTE — PROGRESS NOTE ADULT - PROBLEM SELECTOR PLAN 1
- 2/2 likely aspiration pneumonia  - cultures negative  - completed course of zosyn   - extubated since 9/3, tolerating face tent - will wean to NC  - still requiring chest PT, suction PRN

## 2019-09-05 NOTE — DISCHARGE NOTE PROVIDER - HOSPITAL COURSE
53 yo F with hx cerebral palsy and seizure disorder presented from shelter for acute AMS and increased work of breathing with recent seizure, found to be hypothermic, hypotensive and bradycardic with leukocytosis and imaging showing new lung opacification, concerning for septic shock 2/2 to aspiration PNA. She required pressors and airway support (2/2 status epilepticus), intermittently requiring pressors, seizures controlled with 3 AEDs.     Neuro following for status, medications adjusted.    Successfully extubated to nasal cannula.    Tx to RCU on 9/5 with continued suction requirements.    Failed S&S. 53 yo F with hx cerebral palsy and seizure disorder presented from care home for acute AMS and increased work of breathing with recent seizure, found to be hypothermic, hypotensive and bradycardic with leukocytosis and imaging showing new lung opacification, concerning for septic shock 2/2 to aspiration PNA. She required pressors and airway support (2/2 status epilepticus), intermittently requiring pressors, seizures controlled with 3 AEDs.     Neuro following for status, medications adjusted.    Successfully extubated to nasal cannula. Completed course of abx for aspiration. No further seizure actvity noted.    Tx to RCU on 9/5 with continued suction requirements.    Failed S&S x2 (even with group home at bedside to assist).    NGT was placed for feeds in the meantime.    Family decision about PEG-------------                dispo: 54 F with PMHx cerebral palsy and seizure disorder presented from nursing home for acute AMS and increased work of breathing with recent seizure, found to be hypothermic, hypotensive and bradycardic with leukocytosis and imaging showing new lung opacification, concerning for septic shock 2/2 to aspiration PNA. She required pressors and airway support (2/2 status epilepticus), intermittently requiring pressors, seizures controlled with 3 AEDs.     Neuro following for status, medications adjusted.    Successfully extubated to nasal cannula. Completed course of abx for aspiration. No further seizure actvity noted.    Tx to RCU on 9/5 with continued suction requirements.    Failed S&S x2 (even with group home at bedside to assist).    NGT was placed for feeds in the meantime.    Family agreeable to PEG. PEG placed 9/17. 54 F with PMHx cerebral palsy and seizure disorder presented from [facility] for acute AMS and increased work of breathing with recent seizure, found to be hypothermic, hypotensive and bradycardic with leukocytosis and imaging showing new lung opacification, concerning for septic shock 2/2 to aspiration PNA. She required a MICU admission for pressors and advanced airway support (2/2 status epilepticus), seizures controlled with 4 AEDs. Patient was successfully extubated to nasal cannula on 9/3. Patient failed speech and swallow and had PEG placed on 9/17.        Acute respiratory failure with hypercapnia.    - Likely 2/2 aspiration pneumonia    - Patient required advanced respiratory support at time of presentation and was successfully extubated on 9/3.    - 9/6 sputum cx grew MRSA. Completed course of zosyn 8/19-8/23 and vancomycin 8/31-9/5    - ID consult: Completed course of Zosyn and Vancomycin    - 9/21 sputum cx growing pseudomonas, however, given that patient with nodefer re-initiation of abx for now. If pt febrile and/or respiratory issues, will resume abx.        Dysphagia.  Plan: - failed swallow eval     - mom agreed to PEG tube    - s/p PEG start tube feeds Jevity as per nutrition recs start @ 10 ml advance q4 hrs to goal 50 ml/hr with prosource- tolerating feeds at goal.         Problem/Plan - 2:    ·  Problem: Dehydration.  Plan: Resolved.        Problem/Plan - 3:    ·  Problem:             Problem/Plan - 4:    ·  Problem: Seizure.  Plan: - VEEG with potential epileptogenic focus in the right fronto-temporal region    - no further seizure activity noted    - changed antiepileptics to via PEG --> continue clobazam, lacosamide, Keppra, and valproic acid.            Problem/Plan - 5:    ·  Problem: Hypothermia.  Plan: Resolved. normothermic at present.            Problem/Plan - 6:    Problem: Cerebral palsy, unspecified type. Plan: - pt is alert, nonverbal, tracks    - does not follow commands    - at baseline as per [facility]    - functional quadriplegia    - zyprexa 2.5 mg PEG at bedtime     - supportive care.        Problem/Plan - 7:    ·  Problem: Hypotension.  Plan: - s/p pressors in MICU    - BP stable off midodrine.         Problem/Plan - 8:    ·  Problem: Need for prophylactic measure.  Plan: - lovenox  40units subq daily    Dispo: await rehab needs level II prior to discharge medically stable for discharge. Dc planning meeting monday 9/23 as per care coordination notes 1 pm. 54 F with PMHx cerebral palsy and seizure disorder presented from alf for acute AMS and increased work of breathing with recent seizure, found to be hypothermic, hypotensive and bradycardic with leukocytosis and imaging showing new lung opacification, concerning for septic shock 2/2 to aspiration PNA. She required a MICU admission for pressors and advanced airway support (2/2 status epilepticus), seizures controlled with 4 AEDs. Patient was successfully extubated to nasal cannula on 9/3. Patient failed speech and swallow and had PEG placed on 9/17.        Acute respiratory failure with hypercapnia.    - Likely 2/2 aspiration pneumonia    - Patient required advanced respiratory support at time of presentation and was successfully extubated on 9/3.    - 9/6 sputum cx grew MRSA. Completed course of zosyn 8/19-8/23 and vancomycin 8/31-9/5    - ID consult: Completed course of Zosyn and Vancomycin    - 9/21 sputum cx growing pseudomonas, however, given that patient with nodefer re-initiation of abx for now. If pt febrile and/or respiratory issues, will resume abx.        Dysphagia.      - Pt failed swallow eval     - PEG tube placed on 9/17    - Nutrition consult: Jevity started @ 10 ml advanced q4 hrs to goal 50 ml/hr with prosource once per day    - tolerating feeds at goal        Seizure.    - Neuro consult    - vEEG with potential epileptogenic focus in the right fronto-temporal region    - AEDs to via PEG --> started clobazam 10mg BID, c/w lacosamide, Keppra, and valproic acid    - No further seizure activity noted during admission        Hypotension.     - Patient required pressor support while in MICU    - BP now stable off pressor        Cerebral palsy, unspecified type.    - Pt is at baseline per family and group home staff --> alert, nonverbal, tracks movement, does not follow commands    - Functional quadriplegia    - Zyprexa 2.5 mg via PEG at bedtime         Need for prophylactic measure.    - DVT ppx: Lovenox 40units qD        Discharge: 54 F with PMHx cerebral palsy and seizure disorder presented from snf for acute AMS and increased work of breathing with recent seizure, found to be hypothermic, hypotensive and bradycardic with leukocytosis and imaging showing new lung opacification, concerning for septic shock 2/2 to aspiration PNA. She required a MICU admission for pressors and advanced airway support (2/2 status epilepticus), seizures controlled with 4 AEDs. Patient was successfully extubated to nasal cannula on 9/3. Patient failed speech and swallow and had PEG placed on 9/17.        Acute respiratory failure with hypercapnia.    - Likely 2/2 aspiration pneumonia    - Patient required advanced respiratory support at time of presentation and was successfully extubated on 9/3.    - 9/6 sputum cx grew MRSA. Completed course of zosyn 8/19-8/23 and vancomycin 8/31-9/5    - ID consult: Completed course of Zosyn and Vancomycin    - 9/21 sputum cx growing pseudomonas, however, given that patient with nodefer re-initiation of abx for now. If pt febrile and/or respiratory issues, will resume abx.        Dysphagia.      - Pt failed swallow eval     - PEG tube placed on 9/17    - Nutrition consult: Jevity started @ 10 ml advanced q4 hrs to goal 50 ml/hr with prosource once per day    - tolerating feeds at goal        Seizure.    - Neuro consult    - vEEG with potential epileptogenic focus in the right fronto-temporal region    - AEDs to via PEG --> started clobazam 10mg BID, c/w lacosamide, Keppra, and valproic acid    - No further seizure activity noted during admission        Hypotension.     - Patient required pressor support while in MICU    - BP now stable off pressors        Cerebral palsy, unspecified type.    - Pt is at baseline per family and group home staff --> alert, nonverbal, tracks movement, does not follow commands    - Functional quadriplegia    - Zyprexa 2.5 mg via PEG at bedtime         Need for prophylactic measure.    - DVT ppx: Lovenox 40units qD        On 9/24 this case was reviewed with Dr. Rosales, the patient is medically stable and optimized for discharge. All medications were reviewed and adjusted accordingly. 54 F with PMHx cerebral palsy and seizure disorder presented from alf for acute AMS and increased work of breathing with recent seizure, found to be hypothermic, hypotensive and bradycardic with leukocytosis and imaging showing new lung opacification, concerning for septic shock 2/2 to aspiration PNA. She required a MICU admission for pressors and advanced airway support (2/2 status epilepticus), seizures controlled with 4 AEDs. Pt was successfully extubated to nasal cannula on 9/3. Pt failed speech and swallow and had PEG placed on 9/17.        Acute respiratory failure with hypercapnia.    - Likely 2/2 aspiration pneumonia    - Sputum cx grew MRSA 9/6. Completed course of Zosyn 8/19-8/23 and Vancomycin 8/31-9/5    - ID Cx     - Repeat sputum Cx 9/21 growing Pseudomonas, however, given that pt HDS, will defer re-initiation of abx for now. If pt febrile and/or respiratory issues, will resume Abx.    - Stable, F/U outpatient PCP         Dysphagia.      - PEG tube placed on 9/17    - Nutrition consulted, Jevity slowly increased to 50 cc/hr with Prosource daily, pt tolerating feeds    - Stable, F/U outpatient PCP         Seizure.    - Neuro Cx     - vEEG with potential epileptogenic focus in the right fronto-temporal region    - AEDs via PEG --> started Clobazam, Lacosamide, Keppra and valproic acid    - No further seizure activity noted during admission    - Stable, F/U outpatient PCP         Cerebral palsy, unspecified type.    - Pt is at baseline per family and group home staff --> alert, nonverbal, tracks movement, does not follow commands    - Functional quadriplegia    - Zyprexa 2.5 mg via PEG QHS     - Stable, F/U outpatient PCP         On 9/25 this case was reviewed with Dr. Rosales, the patient is medically stable and optimized for discharge to rehab. All medications were reviewed and adjusted accordingly.

## 2019-09-05 NOTE — PROGRESS NOTE ADULT - PROBLEM SELECTOR PLAN 3
- supportive care - pt is alert, nonverbal, tracks  - does not follow commands  - at baseline as per group home  - supportive care

## 2019-09-05 NOTE — DISCHARGE NOTE PROVIDER - PROVIDER TOKENS
FREE:[LAST:[Primary care provider],PHONE:[(   )    -],FAX:[(   )    -]] FREE:[LAST:[Primary care provider],PHONE:[(   )    -],FAX:[(   )    -]],FREE:[LAST:[Ne[facility]rology Clinic],PHONE:[phone]],FAX:[(   )    -]]

## 2019-09-05 NOTE — DISCHARGE NOTE PROVIDER - NSFOLLOWUPCLINICS_GEN_ALL_ED_FT
[facility]  [facility]  [address] [place] [address]  Phone: [phone]  Fax: [phone]  Follow Up Time:

## 2019-09-05 NOTE — DISCHARGE NOTE PROVIDER - NSDCFUADDAPPT_GEN_ALL_CORE_FT
**Please follow up with primary care provider within 1 - 2 weeks of discharge from rehab for further care and management.    **Please follow up with primary neurologist or the neurology clinic (Phone: [phone]) within 1 - 2 weeks of discharge from rehab for further care and management.

## 2019-09-06 DIAGNOSIS — T68.XXXA HYPOTHERMIA, INITIAL ENCOUNTER: ICD-10-CM

## 2019-09-06 LAB
ANION GAP SERPL CALC-SCNC: 11 MMO/L — SIGNIFICANT CHANGE UP (ref 7–14)
APPEARANCE UR: CLEAR — SIGNIFICANT CHANGE UP
APTT BLD: 39.5 SEC — HIGH (ref 27.5–36.3)
BACTERIA # UR AUTO: NEGATIVE — SIGNIFICANT CHANGE UP
BASE EXCESS BLDA CALC-SCNC: 8.5 MMOL/L — SIGNIFICANT CHANGE UP
BILIRUB UR-MCNC: NEGATIVE — SIGNIFICANT CHANGE UP
BLOOD UR QL VISUAL: SIGNIFICANT CHANGE UP
BUN SERPL-MCNC: 22 MG/DL — SIGNIFICANT CHANGE UP (ref 7–23)
CALCIUM SERPL-MCNC: 8.7 MG/DL — SIGNIFICANT CHANGE UP (ref 8.4–10.5)
CHLORIDE SERPL-SCNC: 105 MMOL/L — SIGNIFICANT CHANGE UP (ref 98–107)
CO2 SERPL-SCNC: 32 MMOL/L — HIGH (ref 22–31)
COLOR SPEC: YELLOW — SIGNIFICANT CHANGE UP
CREAT SERPL-MCNC: 0.93 MG/DL — SIGNIFICANT CHANGE UP (ref 0.5–1.3)
GLUCOSE SERPL-MCNC: 131 MG/DL — HIGH (ref 70–99)
GLUCOSE UR-MCNC: NEGATIVE — SIGNIFICANT CHANGE UP
GRAM STN SPT: SIGNIFICANT CHANGE UP
HCO3 BLDA-SCNC: 31 MMOL/L — HIGH (ref 22–26)
HCT VFR BLD CALC: 30.2 % — LOW (ref 34.5–45)
HGB BLD-MCNC: 9.3 G/DL — LOW (ref 11.5–15.5)
HYALINE CASTS # UR AUTO: NEGATIVE — SIGNIFICANT CHANGE UP
INR BLD: 0.97 — SIGNIFICANT CHANGE UP (ref 0.88–1.17)
KETONES UR-MCNC: NEGATIVE — SIGNIFICANT CHANGE UP
LACTATE SERPL-SCNC: 0.8 MMOL/L — SIGNIFICANT CHANGE UP (ref 0.5–2)
LEUKOCYTE ESTERASE UR-ACNC: NEGATIVE — SIGNIFICANT CHANGE UP
MCHC RBC-ENTMCNC: 30.8 % — LOW (ref 32–36)
MCHC RBC-ENTMCNC: 32.9 PG — SIGNIFICANT CHANGE UP (ref 27–34)
MCV RBC AUTO: 106.7 FL — HIGH (ref 80–100)
NITRITE UR-MCNC: NEGATIVE — SIGNIFICANT CHANGE UP
NRBC # FLD: 0 K/UL — SIGNIFICANT CHANGE UP (ref 0–0)
PCO2 BLDA: 65 MMHG — HIGH (ref 32–48)
PH BLDA: 7.34 PH — LOW (ref 7.35–7.45)
PH UR: 6 — SIGNIFICANT CHANGE UP (ref 5–8)
PLATELET # BLD AUTO: 270 K/UL — SIGNIFICANT CHANGE UP (ref 150–400)
PMV BLD: 9.2 FL — SIGNIFICANT CHANGE UP (ref 7–13)
PO2 BLDA: 106 MMHG — SIGNIFICANT CHANGE UP (ref 83–108)
POTASSIUM SERPL-MCNC: 3.8 MMOL/L — SIGNIFICANT CHANGE UP (ref 3.5–5.3)
POTASSIUM SERPL-SCNC: 3.8 MMOL/L — SIGNIFICANT CHANGE UP (ref 3.5–5.3)
PROT UR-MCNC: 30 — SIGNIFICANT CHANGE UP
PROTHROM AB SERPL-ACNC: 11.1 SEC — SIGNIFICANT CHANGE UP (ref 9.8–13.1)
RBC # BLD: 2.83 M/UL — LOW (ref 3.8–5.2)
RBC # FLD: 14.9 % — HIGH (ref 10.3–14.5)
RBC CASTS # UR COMP ASSIST: SIGNIFICANT CHANGE UP (ref 0–?)
SAO2 % BLDA: 98.2 % — SIGNIFICANT CHANGE UP (ref 95–99)
SODIUM SERPL-SCNC: 148 MMOL/L — HIGH (ref 135–145)
SP GR SPEC: 1.02 — SIGNIFICANT CHANGE UP (ref 1–1.04)
SPECIMEN SOURCE: SIGNIFICANT CHANGE UP
SQUAMOUS # UR AUTO: SIGNIFICANT CHANGE UP
UROBILINOGEN FLD QL: NORMAL — SIGNIFICANT CHANGE UP
WBC # BLD: 6.26 K/UL — SIGNIFICANT CHANGE UP (ref 3.8–10.5)
WBC # FLD AUTO: 6.26 K/UL — SIGNIFICANT CHANGE UP (ref 3.8–10.5)
WBC UR QL: SIGNIFICANT CHANGE UP (ref 0–?)

## 2019-09-06 PROCEDURE — 99232 SBSQ HOSP IP/OBS MODERATE 35: CPT | Mod: GC

## 2019-09-06 RX ORDER — SODIUM CHLORIDE 9 MG/ML
1000 INJECTION INTRAMUSCULAR; INTRAVENOUS; SUBCUTANEOUS ONCE
Refills: 0 | Status: COMPLETED | OUTPATIENT
Start: 2019-09-06 | End: 2019-09-06

## 2019-09-06 RX ADMIN — Medication 28.75 MILLIGRAM(S): at 01:41

## 2019-09-06 RX ADMIN — ENOXAPARIN SODIUM 40 MILLIGRAM(S): 100 INJECTION SUBCUTANEOUS at 12:02

## 2019-09-06 RX ADMIN — OLANZAPINE 7.5 MILLIGRAM(S): 15 TABLET, FILM COATED ORAL at 22:52

## 2019-09-06 RX ADMIN — MIDODRINE HYDROCHLORIDE 10 MILLIGRAM(S): 2.5 TABLET ORAL at 13:14

## 2019-09-06 RX ADMIN — Medication 28.75 MILLIGRAM(S): at 09:16

## 2019-09-06 RX ADMIN — LACOSAMIDE 100 MILLIGRAM(S): 50 TABLET ORAL at 22:52

## 2019-09-06 RX ADMIN — LACOSAMIDE 100 MILLIGRAM(S): 50 TABLET ORAL at 13:18

## 2019-09-06 RX ADMIN — CHLORHEXIDINE GLUCONATE 1 APPLICATION(S): 213 SOLUTION TOPICAL at 05:08

## 2019-09-06 RX ADMIN — SODIUM CHLORIDE 4 MILLILITER(S): 9 INJECTION INTRAMUSCULAR; INTRAVENOUS; SUBCUTANEOUS at 22:09

## 2019-09-06 RX ADMIN — LEVETIRACETAM 400 MILLIGRAM(S): 250 TABLET, FILM COATED ORAL at 13:18

## 2019-09-06 RX ADMIN — ALBUTEROL 2.5 MILLIGRAM(S): 90 AEROSOL, METERED ORAL at 03:35

## 2019-09-06 RX ADMIN — Medication 28.75 MILLIGRAM(S): at 17:34

## 2019-09-06 RX ADMIN — MIDODRINE HYDROCHLORIDE 10 MILLIGRAM(S): 2.5 TABLET ORAL at 05:08

## 2019-09-06 RX ADMIN — SODIUM CHLORIDE 4 MILLILITER(S): 9 INJECTION INTRAMUSCULAR; INTRAVENOUS; SUBCUTANEOUS at 15:47

## 2019-09-06 RX ADMIN — SODIUM CHLORIDE 1000 MILLILITER(S): 9 INJECTION INTRAMUSCULAR; INTRAVENOUS; SUBCUTANEOUS at 03:07

## 2019-09-06 RX ADMIN — ALBUTEROL 2.5 MILLIGRAM(S): 90 AEROSOL, METERED ORAL at 09:33

## 2019-09-06 RX ADMIN — LEVETIRACETAM 400 MILLIGRAM(S): 250 TABLET, FILM COATED ORAL at 09:17

## 2019-09-06 RX ADMIN — ALBUTEROL 2.5 MILLIGRAM(S): 90 AEROSOL, METERED ORAL at 15:44

## 2019-09-06 RX ADMIN — ALBUTEROL 2.5 MILLIGRAM(S): 90 AEROSOL, METERED ORAL at 22:09

## 2019-09-06 RX ADMIN — SODIUM CHLORIDE 4 MILLILITER(S): 9 INJECTION INTRAMUSCULAR; INTRAVENOUS; SUBCUTANEOUS at 09:36

## 2019-09-06 RX ADMIN — MIDODRINE HYDROCHLORIDE 10 MILLIGRAM(S): 2.5 TABLET ORAL at 22:52

## 2019-09-06 RX ADMIN — CLOBAZAM 10 MILLIGRAM(S): 10 TABLET ORAL at 05:18

## 2019-09-06 RX ADMIN — SODIUM CHLORIDE 4 MILLILITER(S): 9 INJECTION INTRAMUSCULAR; INTRAVENOUS; SUBCUTANEOUS at 03:35

## 2019-09-06 RX ADMIN — CLOBAZAM 10 MILLIGRAM(S): 10 TABLET ORAL at 22:17

## 2019-09-06 RX ADMIN — LEVETIRACETAM 400 MILLIGRAM(S): 250 TABLET, FILM COATED ORAL at 20:38

## 2019-09-06 RX ADMIN — LACOSAMIDE 100 MILLIGRAM(S): 50 TABLET ORAL at 05:18

## 2019-09-06 NOTE — PROGRESS NOTE ADULT - PROBLEM SELECTOR PLAN 5
- with NGT currently, tolerating  - pending bedside S&S - [facility] today, they will come in monday to help feed patient as patient would not take food the last time they tried S&S

## 2019-09-06 NOTE — PROGRESS NOTE ADULT - PROBLEM SELECTOR PLAN 3
- hypothermia overnight and mild hypotension, both now improved   - follow blood and sputum cultures  - monitor off abx for now

## 2019-09-06 NOTE — ED PROVIDER NOTE - NS ED ATTENDING STATEMENT MOD
No, just decrease by 10 mg daily per week. (so 20 mg  AM and 10 mg PM for a week, then 10 mg BID for a week). Yes, I sent a message to RIKI about the sulfasalazine. Thanks    I have personally performed a face to face diagnostic evaluation on this patient. I have reviewed the ACP note and agree with the history, exam and plan of care, except as noted.

## 2019-09-06 NOTE — PROGRESS NOTE ADULT - PROBLEM SELECTOR PLAN 4
- pt is alert, nonverbal, tracks  - does not follow commands  - at baseline as per group home  - supportive care

## 2019-09-06 NOTE — SWALLOW BEDSIDE ASSESSMENT ADULT - COMMENTS
As per NP (Cecile), bedside swallow evaluation deferred until Monday 9/9/19, with group home staff member present.

## 2019-09-06 NOTE — PROGRESS NOTE ADULT - SUBJECTIVE AND OBJECTIVE BOX
CHIEF COMPLAINT: Patient is a 54y old  Female who presents with a chief complaint of Septic Shock (05 Sep 2019 16:00)    Interval Events:      REVIEW OF SYSTEMS:  Constitutional:   Eyes:  ENT:  CV:  Resp:  GI:  :  MSK:  Integumentary:  Neurological:  Psychiatric:  Endocrine:  Hematologic/Lymphatic:  Allergic/Immunologic:  [ ] All other systems negative  [ ] Unable to assess ROS because ________      OBJECTIVE:  ICU Vital Signs Last 24 Hrs  T(C): 35.5 (06 Sep 2019 05:00), Max: 35.5 (06 Sep 2019 05:00)  T(F): 95.9 (06 Sep 2019 05:00), Max: 95.9 (06 Sep 2019 05:00)  HR: 94 (06 Sep 2019 05:00) (50 - 94)  BP: 102/45 (06 Sep 2019 05:00) (80/56 - 110/61)  BP(mean): --  ABP: --  ABP(mean): --  RR: 22 (06 Sep 2019 05:00) (18 - 22)  SpO2: 96% (06 Sep 2019 05:00) (96% - 100%)     @ 07:01  -   @ 07:00  --------------------------------------------------------  IN: 2452.8 mL / OUT: 500 mL / NET: 1952.8 mL    HOSPITAL MEDICATIONS:  MEDICATIONS  (STANDING):  ALBUTerol    0.083% 2.5 milliGRAM(s) Nebulizer every 6 hours  ALBUTerol    90 MICROgram(s) HFA Inhaler 1 Puff(s) Inhalation every 4 hours  chlorhexidine 4% Liquid 1 Application(s) Topical <User Schedule>  Clobazam Suspension 10 milliGRAM(s) Oral two times a day  enoxaparin Injectable 40 milliGRAM(s) SubCutaneous daily  lacosamide Solution 100 milliGRAM(s) Oral <User Schedule>  levETIRAcetam  IVPB 1000 milliGRAM(s) IV Intermittent <User Schedule>  midodrine 10 milliGRAM(s) Oral every 8 hours  OLANZapine 7.5 milliGRAM(s) Oral at bedtime  sodium chloride 3%  Inhalation 4 milliLiter(s) Inhalation every 6 hours  valproate sodium IVPB 750 milliGRAM(s) IV Intermittent every 8 hours    MEDICATIONS  (PRN):  sodium chloride 0.9% lock flush 10 milliLiter(s) IV Push every 1 hour PRN Pre/post blood products, medications, blood draw, and to maintain line patency      LABS:                        9.3    6.26  )-----------( 270      ( 06 Sep 2019 03:00 )             30.2     -    148<H>  |  105  |  22  ----------------------------<  131<H>  3.8   |  32<H>  |  0.93    Ca    8.7      06 Sep 2019 03:00  Phos  5.2       Mg     2.1         TPro  7.1  /  Alb  3.0<L>  /  TBili  0.3  /  DBili  x   /  AST  31  /  ALT  19  /  AlkPhos  98  09-05    PT/INR - ( 06 Sep 2019 03:00 )   PT: 11.1 SEC;   INR: 0.97          PTT - ( 06 Sep 2019 03:00 )  PTT:39.5 SEC  Urinalysis Basic - ( 06 Sep 2019 04:00 )    Color: YELLOW / Appearance: CLEAR / S.018 / pH: 6.0  Gluc: NEGATIVE / Ketone: NEGATIVE  / Bili: NEGATIVE / Urobili: NORMAL   Blood: TRACE / Protein: 30 / Nitrite: NEGATIVE   Leuk Esterase: NEGATIVE / RBC: 3-5 / WBC 3-5   Sq Epi: FEW / Non Sq Epi: x / Bacteria: NEGATIVE      Arterial Blood Gas:   @ 03:00  7.34/65/106/31/98.2/8.5  ABG lactate: --        MICROBIOLOGY:     RADIOLOGY:  [ ] Reviewed and interpreted by me    PULMONARY FUNCTION TESTS:    EKG: CHIEF COMPLAINT: Patient is a 54y old  Female who presents with a chief complaint of Septic Shock (05 Sep 2019 16:00)    Interval Events: none overnight       REVIEW OF SYSTEMS:  [ ] All other systems negative  [x] Unable to assess ROS because: nonverbal, does not follow commands       OBJECTIVE:  ICU Vital Signs Last 24 Hrs  T(C): 35.5 (06 Sep 2019 05:00), Max: 35.5 (06 Sep 2019 05:00)  T(F): 95.9 (06 Sep 2019 05:00), Max: 95.9 (06 Sep 2019 05:00)  HR: 94 (06 Sep 2019 05:00) (50 - 94)  BP: 102/45 (06 Sep 2019 05:00) (80/56 - 110/61)  BP(mean): --  ABP: --  ABP(mean): --  RR: 22 (06 Sep 2019 05:00) (18 - 22)  SpO2: 96% (06 Sep 2019 05:00) (96% - 100%)     @ 07:01  -   @ 07:00  --------------------------------------------------------  IN: 2452.8 mL / OUT: 500 mL / NET: 1952.8 mL    HOSPITAL MEDICATIONS:  MEDICATIONS  (STANDING):  ALBUTerol    0.083% 2.5 milliGRAM(s) Nebulizer every 6 hours  ALBUTerol    90 MICROgram(s) HFA Inhaler 1 Puff(s) Inhalation every 4 hours  chlorhexidine 4% Liquid 1 Application(s) Topical <User Schedule>  Clobazam Suspension 10 milliGRAM(s) Oral two times a day  enoxaparin Injectable 40 milliGRAM(s) SubCutaneous daily  lacosamide Solution 100 milliGRAM(s) Oral <User Schedule>  levETIRAcetam  IVPB 1000 milliGRAM(s) IV Intermittent <User Schedule>  midodrine 10 milliGRAM(s) Oral every 8 hours  OLANZapine 7.5 milliGRAM(s) Oral at bedtime  sodium chloride 3%  Inhalation 4 milliLiter(s) Inhalation every 6 hours  valproate sodium IVPB 750 milliGRAM(s) IV Intermittent every 8 hours    MEDICATIONS  (PRN):  sodium chloride 0.9% lock flush 10 milliLiter(s) IV Push every 1 hour PRN Pre/post blood products, medications, blood draw, and to maintain line patency      LABS:                        9.3    6.26  )-----------( 270      ( 06 Sep 2019 03:00 )             30.2         148<H>  |  105  |  22  ----------------------------<  131<H>  3.8   |  32<H>  |  0.93    Ca    8.7      06 Sep 2019 03:00    PT/INR - ( 06 Sep 2019 03:00 )   PT: 11.1 SEC;   INR: 0.97          PTT - ( 06 Sep 2019 03:00 )  PTT:39.5 SEC  Urinalysis Basic - ( 06 Sep 2019 04:00 )    Color: YELLOW / Appearance: CLEAR / S.018 / pH: 6.0  Gluc: NEGATIVE / Ketone: NEGATIVE  / Bili: NEGATIVE / Urobili: NORMAL   Blood: TRACE / Protein: 30 / Nitrite: NEGATIVE   Leuk Esterase: NEGATIVE / RBC: 3-5 / WBC 3-5   Sq Epi: FEW / Non Sq Epi: x / Bacteria: NEGATIVE      Arterial Blood Gas:   @ 03:00  7.34/65/106/31/98.2/8.5  ABG lactate: --

## 2019-09-06 NOTE — PROGRESS NOTE ADULT - PROBLEM SELECTOR PLAN 2
- with concern for possible status in MICU  - VEEG with potential epileptogenic focus in the right fronto-temporal region  - no further seizure activity noted  - cont AEDs as ordered

## 2019-09-06 NOTE — PROGRESS NOTE ADULT - PROBLEM SELECTOR PLAN 1
- 2/2 likely aspiration pneumonia  - cultures negative  - completed course of zosyn   - extubated since 9/3, tolerating nasal cannula  - still requiring chest PT, suction PRN

## 2019-09-06 NOTE — PROGRESS NOTE ADULT - ASSESSMENT
54F with a PMH of cerebral palsy and seizure disorder presenting from [facility] home for acute AMS and increased work of breathing with recent seizure found to be hypothermic, hypotensive and bradycardic with leukocytosis and imaging showing new right lung opacification concerning for septic shock 2/2 to aspiration PNA requiring pressors and airway support, intermittently requiring pressors, seizures controlled with 4 AEDs.   Now extubated, off pressors, on midodrine.

## 2019-09-06 NOTE — CHART NOTE - NSCHARTNOTEFT_GEN_A_CORE
Patient seen for length of stay nutrition follow-up, day 19.     Pt with PMH of cerebral palsy and seizure disorder presenting from group home for acute AMS and increased work of breathing with recent seizure found to be hypothermic, hypotensive and bradycardic with leukocytosis and imaging showing new right lung opacification concerning for septic shock 2/2 to aspiration PNA requiring pressors and airway support, intermittently requiring pressors, seizures controlled with 4 AEDs.    Pt is non verbal, pulled out NG tube, new one placed and is now at goal rate. Per RN remains with good tolerance to NG feeds. No reported vomiting. Remains with flexiseal. Sodium remains elevated, noted fluids increased to 300 ml every 6 hrs.     Feeds providing 1440 kcals and total 82 gm pro.     __________________Current Diet _______________  Diet, NPO with Tube Feed:   Tube Feeding Modality: Nasogastric  Jevity 1.2 Shady (JEVITY1.2RTH)  Total Volume for 24 Hours (mL): 1200  Continuous  Starting Tube Feed Rate {mL per Hour}: 10  Increase Tube Feed Rate by (mL): 10     Every 4 hours  Until Goal Tube Feed Rate (mL per Hour): 50  Tube Feed Duration (in Hours): 24  Tube Feed Start Time: 08:00  No Carb Prosource (1pkg = 15gms Protein)     Qty per Day:  1 (09-04-19 @ 14:44)      __________________ Pertinent Medications__________________      MEDICATIONS  (STANDING):  ALBUTerol    0.083% 2.5 milliGRAM(s) Nebulizer every 6 hours  ALBUTerol    90 MICROgram(s) HFA Inhaler 1 Puff(s) Inhalation every 4 hours  chlorhexidine 4% Liquid 1 Application(s) Topical <User Schedule>  Clobazam Suspension 10 milliGRAM(s) Oral two times a day  enoxaparin Injectable 40 milliGRAM(s) SubCutaneous daily  lacosamide Solution 100 milliGRAM(s) Oral <User Schedule>  levETIRAcetam  IVPB 1000 milliGRAM(s) IV Intermittent <User Schedule>  midodrine 10 milliGRAM(s) Oral every 8 hours  OLANZapine 7.5 milliGRAM(s) Oral at bedtime  sodium chloride 3%  Inhalation 4 milliLiter(s) Inhalation every 6 hours  valproate sodium IVPB 750 milliGRAM(s) IV Intermittent every 8 hours      __________________ Pertinent Labs__________________     09-06 @ 03:00: Na 148<H>, BUN 22, Cr 0.93, <H>, K+ 3.8, Phos      ___________________Weights: ____________________      (9/5) 73.8 kg      (9/4) 73.5       (9/3) 70      (8/29) 73.6     _______________Skin_______________       No pressure injury     __________________Edema___________    4+ generalized edema per Nursing flowsheet.      Previous Nutrition Diagnosis:     [x] Malnutrition, moderate  Nutrition Diagnosis is [ x] ongoing  [ ] resolved [ ] not applicable     ~~~~~~~~~PLANS~~~~~~~~~~~~~     1. Continue with current TF as ordered.  2. Monitor TF tolerance, skin, labs.   3. Nutrition services remains available.     EDDIE Jefferson RD, CDN, CDE         S. MIKA Jefferson, CAROLINAN, CDE

## 2019-09-07 LAB
HCT VFR BLD CALC: 28 % — LOW (ref 34.5–45)
HGB BLD-MCNC: 8.4 G/DL — LOW (ref 11.5–15.5)
MCHC RBC-ENTMCNC: 30 % — LOW (ref 32–36)
MCHC RBC-ENTMCNC: 32.8 PG — SIGNIFICANT CHANGE UP (ref 27–34)
MCV RBC AUTO: 109.4 FL — HIGH (ref 80–100)
NRBC # FLD: 0 K/UL — SIGNIFICANT CHANGE UP (ref 0–0)
PLATELET # BLD AUTO: 295 K/UL — SIGNIFICANT CHANGE UP (ref 150–400)
PMV BLD: 9.4 FL — SIGNIFICANT CHANGE UP (ref 7–13)
RBC # BLD: 2.56 M/UL — LOW (ref 3.8–5.2)
RBC # FLD: 15.5 % — HIGH (ref 10.3–14.5)
SPECIMEN SOURCE: SIGNIFICANT CHANGE UP
WBC # BLD: 6.24 K/UL — SIGNIFICANT CHANGE UP (ref 3.8–10.5)
WBC # FLD AUTO: 6.24 K/UL — SIGNIFICANT CHANGE UP (ref 3.8–10.5)

## 2019-09-07 PROCEDURE — 99232 SBSQ HOSP IP/OBS MODERATE 35: CPT | Mod: GC

## 2019-09-07 RX ORDER — MIDODRINE HYDROCHLORIDE 2.5 MG/1
5 TABLET ORAL EVERY 8 HOURS
Refills: 0 | Status: DISCONTINUED | OUTPATIENT
Start: 2019-09-07 | End: 2019-09-08

## 2019-09-07 RX ADMIN — Medication 28.75 MILLIGRAM(S): at 09:55

## 2019-09-07 RX ADMIN — MIDODRINE HYDROCHLORIDE 5 MILLIGRAM(S): 2.5 TABLET ORAL at 21:38

## 2019-09-07 RX ADMIN — OLANZAPINE 7.5 MILLIGRAM(S): 15 TABLET, FILM COATED ORAL at 21:39

## 2019-09-07 RX ADMIN — LEVETIRACETAM 400 MILLIGRAM(S): 250 TABLET, FILM COATED ORAL at 20:45

## 2019-09-07 RX ADMIN — ALBUTEROL 2.5 MILLIGRAM(S): 90 AEROSOL, METERED ORAL at 22:08

## 2019-09-07 RX ADMIN — CHLORHEXIDINE GLUCONATE 1 APPLICATION(S): 213 SOLUTION TOPICAL at 05:24

## 2019-09-07 RX ADMIN — LEVETIRACETAM 400 MILLIGRAM(S): 250 TABLET, FILM COATED ORAL at 13:15

## 2019-09-07 RX ADMIN — ENOXAPARIN SODIUM 40 MILLIGRAM(S): 100 INJECTION SUBCUTANEOUS at 12:41

## 2019-09-07 RX ADMIN — CLOBAZAM 10 MILLIGRAM(S): 10 TABLET ORAL at 18:39

## 2019-09-07 RX ADMIN — SODIUM CHLORIDE 4 MILLILITER(S): 9 INJECTION INTRAMUSCULAR; INTRAVENOUS; SUBCUTANEOUS at 03:47

## 2019-09-07 RX ADMIN — MIDODRINE HYDROCHLORIDE 10 MILLIGRAM(S): 2.5 TABLET ORAL at 05:17

## 2019-09-07 RX ADMIN — Medication 28.75 MILLIGRAM(S): at 02:17

## 2019-09-07 RX ADMIN — ALBUTEROL 2.5 MILLIGRAM(S): 90 AEROSOL, METERED ORAL at 03:47

## 2019-09-07 RX ADMIN — Medication 28.75 MILLIGRAM(S): at 18:52

## 2019-09-07 RX ADMIN — LACOSAMIDE 100 MILLIGRAM(S): 50 TABLET ORAL at 13:14

## 2019-09-07 RX ADMIN — SODIUM CHLORIDE 4 MILLILITER(S): 9 INJECTION INTRAMUSCULAR; INTRAVENOUS; SUBCUTANEOUS at 22:12

## 2019-09-07 RX ADMIN — LACOSAMIDE 100 MILLIGRAM(S): 50 TABLET ORAL at 05:17

## 2019-09-07 RX ADMIN — ALBUTEROL 2.5 MILLIGRAM(S): 90 AEROSOL, METERED ORAL at 10:40

## 2019-09-07 RX ADMIN — LACOSAMIDE 100 MILLIGRAM(S): 50 TABLET ORAL at 21:38

## 2019-09-07 RX ADMIN — CLOBAZAM 10 MILLIGRAM(S): 10 TABLET ORAL at 05:27

## 2019-09-07 RX ADMIN — SODIUM CHLORIDE 4 MILLILITER(S): 9 INJECTION INTRAMUSCULAR; INTRAVENOUS; SUBCUTANEOUS at 10:40

## 2019-09-07 RX ADMIN — SODIUM CHLORIDE 4 MILLILITER(S): 9 INJECTION INTRAMUSCULAR; INTRAVENOUS; SUBCUTANEOUS at 15:56

## 2019-09-07 RX ADMIN — ALBUTEROL 2.5 MILLIGRAM(S): 90 AEROSOL, METERED ORAL at 15:56

## 2019-09-07 RX ADMIN — LEVETIRACETAM 400 MILLIGRAM(S): 250 TABLET, FILM COATED ORAL at 05:22

## 2019-09-07 RX ADMIN — MIDODRINE HYDROCHLORIDE 5 MILLIGRAM(S): 2.5 TABLET ORAL at 13:14

## 2019-09-07 NOTE — PROGRESS NOTE ADULT - SUBJECTIVE AND OBJECTIVE BOX
CHIEF COMPLAINT:   	      Interval Events: no event overnight     REVIEW OF SYSTEMS:    [ x] Unable to assess ROS because non verbal    OBJECTIVE:  ICU Vital Signs Last 24 Hrs  T(C): 36.4 (07 Sep 2019 05:15), Max: 36.7 (06 Sep 2019 13:12)  T(F): 97.5 (07 Sep 2019 05:15), Max: 98.1 (06 Sep 2019 13:12)  HR: 89 (07 Sep 2019 05:15) (78 - 99)  BP: 125/71 (07 Sep 2019 05:15) (109/53 - 125/71)    RR: 19 (07 Sep 2019 05:15) (19 - 22)  SpO2: 100% (07 Sep 2019 05:15) (94% - 100%)         @ 07:01  -   @ 07:00  --------------------------------------------------------  IN: 1800 mL / OUT: 0 mL / NET: 1800 mL      CAPILLARY BLOOD GLUCOSE          HOSPITAL MEDICATIONS:  MEDICATIONS  (STANDING):  ALBUTerol    0.083% 2.5 milliGRAM(s) Nebulizer every 6 hours  ALBUTerol    90 MICROgram(s) HFA Inhaler 1 Puff(s) Inhalation every 4 hours  chlorhexidine 4% Liquid 1 Application(s) Topical <User Schedule>  Clobazam Suspension 10 milliGRAM(s) Oral two times a day  enoxaparin Injectable 40 milliGRAM(s) SubCutaneous daily  lacosamide Solution 100 milliGRAM(s) Oral <User Schedule>  levETIRAcetam  IVPB 1000 milliGRAM(s) IV Intermittent <User Schedule>  midodrine 10 milliGRAM(s) Oral every 8 hours  OLANZapine 7.5 milliGRAM(s) Oral at bedtime  sodium chloride 3%  Inhalation 4 milliLiter(s) Inhalation every 6 hours  valproate sodium IVPB 750 milliGRAM(s) IV Intermittent every 8 hours    MEDICATIONS  (PRN):  sodium chloride 0.9% lock flush 10 milliLiter(s) IV Push every 1 hour PRN Pre/post blood products, medications, blood draw, and to maintain line patency      LABS:                        8.4    6.24  )-----------( 295      ( 07 Sep 2019 06:15 )             28.0         148<H>  |  105  |  22  ----------------------------<  131<H>  3.8   |  32<H>  |  0.93    Ca    8.7      06 Sep 2019 03:00      PT/INR - ( 06 Sep 2019 03:00 )   PT: 11.1 SEC;   INR: 0.97          PTT - ( 06 Sep 2019 03:00 )  PTT:39.5 SEC  Urinalysis Basic - ( 06 Sep 2019 04:00 )    Color: YELLOW / Appearance: CLEAR / S.018 / pH: 6.0  Gluc: NEGATIVE / Ketone: NEGATIVE  / Bili: NEGATIVE / Urobili: NORMAL   Blood: TRACE / Protein: 30 / Nitrite: NEGATIVE   Leuk Esterase: NEGATIVE / RBC: 3-5 / WBC 3-5   Sq Epi: FEW / Non Sq Epi: x / Bacteria: NEGATIVE      Arterial Blood Gas:   @ 03:00  7.34/65/106/31/98.2/8.5 CHIEF COMPLAINT:   	  Interval Events: no events overnight     REVIEW OF SYSTEMS:    [ x] Unable to assess ROS because non verbal    OBJECTIVE:  ICU Vital Signs Last 24 Hrs  T(C): 36.4 (07 Sep 2019 05:15), Max: 36.7 (06 Sep 2019 13:12)  T(F): 97.5 (07 Sep 2019 05:15), Max: 98.1 (06 Sep 2019 13:12)  HR: 89 (07 Sep 2019 05:15) (78 - 99)  BP: 125/71 (07 Sep 2019 05:15) (109/53 - 125/71)    RR: 19 (07 Sep 2019 05:15) (19 - 22)  SpO2: 100% (07 Sep 2019 05:15) (94% - 100%)         @ 07:01  -   @ 07:00  --------------------------------------------------------  IN: 1800 mL / OUT: 0 mL / NET: 1800 mL      CAPILLARY BLOOD GLUCOSE          HOSPITAL MEDICATIONS:  MEDICATIONS  (STANDING):  ALBUTerol    0.083% 2.5 milliGRAM(s) Nebulizer every 6 hours  ALBUTerol    90 MICROgram(s) HFA Inhaler 1 Puff(s) Inhalation every 4 hours  chlorhexidine 4% Liquid 1 Application(s) Topical <User Schedule>  Clobazam Suspension 10 milliGRAM(s) Oral two times a day  enoxaparin Injectable 40 milliGRAM(s) SubCutaneous daily  lacosamide Solution 100 milliGRAM(s) Oral <User Schedule>  levETIRAcetam  IVPB 1000 milliGRAM(s) IV Intermittent <User Schedule>  midodrine 10 milliGRAM(s) Oral every 8 hours  OLANZapine 7.5 milliGRAM(s) Oral at bedtime  sodium chloride 3%  Inhalation 4 milliLiter(s) Inhalation every 6 hours  valproate sodium IVPB 750 milliGRAM(s) IV Intermittent every 8 hours    MEDICATIONS  (PRN):  sodium chloride 0.9% lock flush 10 milliLiter(s) IV Push every 1 hour PRN Pre/post blood products, medications, blood draw, and to maintain line patency      LABS:                        8.4    6.24  )-----------( 295      ( 07 Sep 2019 06:15 )             28.0         148<H>  |  105  |  22  ----------------------------<  131<H>  3.8   |  32<H>  |  0.93    Ca    8.7      06 Sep 2019 03:00      PT/INR - ( 06 Sep 2019 03:00 )   PT: 11.1 SEC;   INR: 0.97          PTT - ( 06 Sep 2019 03:00 )  PTT:39.5 SEC  Urinalysis Basic - ( 06 Sep 2019 04:00 )    Color: YELLOW / Appearance: CLEAR / S.018 / pH: 6.0  Gluc: NEGATIVE / Ketone: NEGATIVE  / Bili: NEGATIVE / Urobili: NORMAL   Blood: TRACE / Protein: 30 / Nitrite: NEGATIVE   Leuk Esterase: NEGATIVE / RBC: 3-5 / WBC 3-5   Sq Epi: FEW / Non Sq Epi: x / Bacteria: NEGATIVE      Arterial Blood Gas:   @ 03:00  7.34/65/106/31/98.2/8.5

## 2019-09-07 NOTE — PROGRESS NOTE ADULT - ASSESSMENT
54F with a PMH of cerebral palsy and seizure disorder presenting from [facility] home for acute AMS and increased work of breathing with recent seizure found to be hypothermic, hypotensive and bradycardic with leukocytosis and imaging showing new right lung opacification concerning for septic shock 2/2 to aspiration PNA requiring pressors and airway support, intermittently requiring pressors, seizures controlled with 4 AEDs.   Now extubated, off pressors, on midodrine.

## 2019-09-07 NOTE — PROGRESS NOTE ADULT - PROBLEM SELECTOR PLAN 5
- with NGT currently, tolerating  - bedside swallow evaluation deferred until  9/9/19, with group home staff member present as patient would not take food

## 2019-09-07 NOTE — PROGRESS NOTE ADULT - ATTENDING COMMENTS
remains stable  NGT feeds  Group Home will send someone on Monday to try and feed her, together with speech and swallow  taper midodrine  monitor Hb , Na

## 2019-09-08 LAB
-  CEFAZOLIN: SIGNIFICANT CHANGE UP
-  CIPROFLOXACIN: SIGNIFICANT CHANGE UP
-  CLINDAMYCIN: SIGNIFICANT CHANGE UP
-  ERYTHROMYCIN: SIGNIFICANT CHANGE UP
-  GENTAMICIN: SIGNIFICANT CHANGE UP
-  LEVOFLOXACIN: SIGNIFICANT CHANGE UP
-  LINEZOLID: SIGNIFICANT CHANGE UP
-  MOXIFLOXACIN(AEROBIC): SIGNIFICANT CHANGE UP
-  OXACILLIN: SIGNIFICANT CHANGE UP
-  PENICILLIN: SIGNIFICANT CHANGE UP
-  RIFAMPIN.: SIGNIFICANT CHANGE UP
-  TETRACYCLINE: SIGNIFICANT CHANGE UP
-  TRIMETHOPRIM/SULFAMETHOXAZOLE: SIGNIFICANT CHANGE UP
-  VANCOMYCIN: SIGNIFICANT CHANGE UP
ANION GAP SERPL CALC-SCNC: 9 MMO/L — SIGNIFICANT CHANGE UP (ref 7–14)
BACTERIA SPT RESP CULT: SIGNIFICANT CHANGE UP
BUN SERPL-MCNC: 22 MG/DL — SIGNIFICANT CHANGE UP (ref 7–23)
CALCIUM SERPL-MCNC: 9.1 MG/DL — SIGNIFICANT CHANGE UP (ref 8.4–10.5)
CHLORIDE SERPL-SCNC: 104 MMOL/L — SIGNIFICANT CHANGE UP (ref 98–107)
CO2 SERPL-SCNC: 33 MMOL/L — HIGH (ref 22–31)
CREAT SERPL-MCNC: 0.95 MG/DL — SIGNIFICANT CHANGE UP (ref 0.5–1.3)
GLUCOSE SERPL-MCNC: 108 MG/DL — HIGH (ref 70–99)
GRAM STN SPT: SIGNIFICANT CHANGE UP
HCT VFR BLD CALC: 29.5 % — LOW (ref 34.5–45)
HGB BLD-MCNC: 8.9 G/DL — LOW (ref 11.5–15.5)
MAGNESIUM SERPL-MCNC: 2.2 MG/DL — SIGNIFICANT CHANGE UP (ref 1.6–2.6)
MCHC RBC-ENTMCNC: 30.2 % — LOW (ref 32–36)
MCHC RBC-ENTMCNC: 32.6 PG — SIGNIFICANT CHANGE UP (ref 27–34)
MCV RBC AUTO: 108.1 FL — HIGH (ref 80–100)
METHOD TYPE: SIGNIFICANT CHANGE UP
NRBC # FLD: 0.03 K/UL — SIGNIFICANT CHANGE UP (ref 0–0)
ORGANISM # SPEC MICROSCOPIC CNT: SIGNIFICANT CHANGE UP
ORGANISM # SPEC MICROSCOPIC CNT: SIGNIFICANT CHANGE UP
PHOSPHATE SERPL-MCNC: 3.7 MG/DL — SIGNIFICANT CHANGE UP (ref 2.5–4.5)
PLATELET # BLD AUTO: 297 K/UL — SIGNIFICANT CHANGE UP (ref 150–400)
PMV BLD: 9.5 FL — SIGNIFICANT CHANGE UP (ref 7–13)
POTASSIUM SERPL-MCNC: 4.6 MMOL/L — SIGNIFICANT CHANGE UP (ref 3.5–5.3)
POTASSIUM SERPL-SCNC: 4.6 MMOL/L — SIGNIFICANT CHANGE UP (ref 3.5–5.3)
RBC # BLD: 2.73 M/UL — LOW (ref 3.8–5.2)
RBC # FLD: 15.2 % — HIGH (ref 10.3–14.5)
SODIUM SERPL-SCNC: 146 MMOL/L — HIGH (ref 135–145)
WBC # BLD: 6.29 K/UL — SIGNIFICANT CHANGE UP (ref 3.8–10.5)
WBC # FLD AUTO: 6.29 K/UL — SIGNIFICANT CHANGE UP (ref 3.8–10.5)

## 2019-09-08 PROCEDURE — 99232 SBSQ HOSP IP/OBS MODERATE 35: CPT | Mod: GC

## 2019-09-08 RX ORDER — MIDODRINE HYDROCHLORIDE 2.5 MG/1
2.5 TABLET ORAL EVERY 8 HOURS
Refills: 0 | Status: DISCONTINUED | OUTPATIENT
Start: 2019-09-08 | End: 2019-09-09

## 2019-09-08 RX ADMIN — SODIUM CHLORIDE 4 MILLILITER(S): 9 INJECTION INTRAMUSCULAR; INTRAVENOUS; SUBCUTANEOUS at 04:59

## 2019-09-08 RX ADMIN — LEVETIRACETAM 400 MILLIGRAM(S): 250 TABLET, FILM COATED ORAL at 07:15

## 2019-09-08 RX ADMIN — ALBUTEROL 2.5 MILLIGRAM(S): 90 AEROSOL, METERED ORAL at 21:05

## 2019-09-08 RX ADMIN — LACOSAMIDE 100 MILLIGRAM(S): 50 TABLET ORAL at 13:53

## 2019-09-08 RX ADMIN — LACOSAMIDE 100 MILLIGRAM(S): 50 TABLET ORAL at 05:46

## 2019-09-08 RX ADMIN — ALBUTEROL 2.5 MILLIGRAM(S): 90 AEROSOL, METERED ORAL at 15:56

## 2019-09-08 RX ADMIN — SODIUM CHLORIDE 4 MILLILITER(S): 9 INJECTION INTRAMUSCULAR; INTRAVENOUS; SUBCUTANEOUS at 21:10

## 2019-09-08 RX ADMIN — CLOBAZAM 10 MILLIGRAM(S): 10 TABLET ORAL at 17:24

## 2019-09-08 RX ADMIN — Medication 28.75 MILLIGRAM(S): at 02:55

## 2019-09-08 RX ADMIN — Medication 28.75 MILLIGRAM(S): at 17:22

## 2019-09-08 RX ADMIN — OLANZAPINE 7.5 MILLIGRAM(S): 15 TABLET, FILM COATED ORAL at 21:34

## 2019-09-08 RX ADMIN — Medication 28.75 MILLIGRAM(S): at 09:21

## 2019-09-08 RX ADMIN — ALBUTEROL 2.5 MILLIGRAM(S): 90 AEROSOL, METERED ORAL at 10:06

## 2019-09-08 RX ADMIN — LEVETIRACETAM 400 MILLIGRAM(S): 250 TABLET, FILM COATED ORAL at 13:54

## 2019-09-08 RX ADMIN — LACOSAMIDE 100 MILLIGRAM(S): 50 TABLET ORAL at 21:34

## 2019-09-08 RX ADMIN — ALBUTEROL 2.5 MILLIGRAM(S): 90 AEROSOL, METERED ORAL at 04:55

## 2019-09-08 RX ADMIN — SODIUM CHLORIDE 4 MILLILITER(S): 9 INJECTION INTRAMUSCULAR; INTRAVENOUS; SUBCUTANEOUS at 10:06

## 2019-09-08 RX ADMIN — SODIUM CHLORIDE 4 MILLILITER(S): 9 INJECTION INTRAMUSCULAR; INTRAVENOUS; SUBCUTANEOUS at 15:56

## 2019-09-08 RX ADMIN — MIDODRINE HYDROCHLORIDE 2.5 MILLIGRAM(S): 2.5 TABLET ORAL at 13:54

## 2019-09-08 RX ADMIN — CLOBAZAM 10 MILLIGRAM(S): 10 TABLET ORAL at 05:50

## 2019-09-08 RX ADMIN — MIDODRINE HYDROCHLORIDE 5 MILLIGRAM(S): 2.5 TABLET ORAL at 05:56

## 2019-09-08 RX ADMIN — LEVETIRACETAM 400 MILLIGRAM(S): 250 TABLET, FILM COATED ORAL at 20:02

## 2019-09-08 RX ADMIN — CHLORHEXIDINE GLUCONATE 1 APPLICATION(S): 213 SOLUTION TOPICAL at 07:15

## 2019-09-08 RX ADMIN — ENOXAPARIN SODIUM 40 MILLIGRAM(S): 100 INJECTION SUBCUTANEOUS at 12:11

## 2019-09-08 RX ADMIN — MIDODRINE HYDROCHLORIDE 2.5 MILLIGRAM(S): 2.5 TABLET ORAL at 21:34

## 2019-09-08 NOTE — PROGRESS NOTE ADULT - ASSESSMENT
54F with a PMH of cerebral palsy and seizure disorder presenting from [facility] home for acute AMS and increased work of breathing with recent seizure found to be hypothermic, hypotensive and bradycardic with leukocytosis and imaging showing new right lung opacification concerning for septic shock 2/2 to aspiration PNA requiring pressors and airway support, intermittently requiring pressors, seizures controlled with 4 AEDs.   Now extubated, off pressors, on midodrine.

## 2019-09-08 NOTE — PROGRESS NOTE ADULT - SUBJECTIVE AND OBJECTIVE BOX
CHIEF COMPLAINT:    Interval Events:    REVIEW OF SYSTEMS:  Constitutional: No fevers   Resp: No dyspnea at rest.   GI: No vomiting. No diarrhea.  [x ] Unable fully assess ROS because ________    OBJECTIVE:  ICU Vital Signs Last 24 Hrs  T(C): 36.5 (08 Sep 2019 05:37), Max: 37.2 (08 Sep 2019 03:00)  T(F): 97.7 (08 Sep 2019 05:37), Max: 98.9 (08 Sep 2019 03:00)  HR: 105 (08 Sep 2019 05:37) (78 - 107)  BP: 118/62 (08 Sep 2019 05:37) (101/62 - 125/73)    RR: 18 (08 Sep 2019 05:37) (16 - 20)  SpO2: 94% (08 Sep 2019 05:37) (94% - 100%)        09-07 @ 07:01  -  09-08 @ 07:00  --------------------------------------------------------  IN: 1250 mL / OUT: 550 mL / NET: 700 mL      CAPILLARY BLOOD GLUCOSE      HOSPITAL MEDICATIONS:  MEDICATIONS  (STANDING):  ALBUTerol    0.083% 2.5 milliGRAM(s) Nebulizer every 6 hours  ALBUTerol    90 MICROgram(s) HFA Inhaler 1 Puff(s) Inhalation every 4 hours  chlorhexidine 4% Liquid 1 Application(s) Topical <User Schedule>  Clobazam Suspension 10 milliGRAM(s) Oral two times a day  enoxaparin Injectable 40 milliGRAM(s) SubCutaneous daily  lacosamide Solution 100 milliGRAM(s) Oral <User Schedule>  levETIRAcetam  IVPB 1000 milliGRAM(s) IV Intermittent <User Schedule>  midodrine 5 milliGRAM(s) Oral every 8 hours  OLANZapine 7.5 milliGRAM(s) Oral at bedtime  sodium chloride 3%  Inhalation 4 milliLiter(s) Inhalation every 6 hours  valproate sodium IVPB 750 milliGRAM(s) IV Intermittent every 8 hours    MEDICATIONS  (PRN):  sodium chloride 0.9% lock flush 10 milliLiter(s) IV Push every 1 hour PRN Pre/post blood products, medications, blood draw, and to maintain line patency      LABS:                        8.9    6.29  )-----------( 297      ( 08 Sep 2019 06:45 )             29.5 CHIEF COMPLAINT: pt is more alert today     Interval Events: no events overnight     REVIEW OF SYSTEMS:  Constitutional: No fevers   Resp: No dyspnea at rest.   GI: No vomiting. No diarrhea.  [x ] Unable fully assess ROS because non verbal     OBJECTIVE:  ICU Vital Signs Last 24 Hrs  T(C): 36.5 (08 Sep 2019 05:37), Max: 37.2 (08 Sep 2019 03:00)  T(F): 97.7 (08 Sep 2019 05:37), Max: 98.9 (08 Sep 2019 03:00)  HR: 105 (08 Sep 2019 05:37) (78 - 107)  BP: 118/62 (08 Sep 2019 05:37) (101/62 - 125/73)    RR: 18 (08 Sep 2019 05:37) (16 - 20)  SpO2: 94% (08 Sep 2019 05:37) (94% - 100%)        09-07 @ 07:01  -  09-08 @ 07:00  --------------------------------------------------------  IN: 1250 mL / OUT: 550 mL / NET: 700 mL      CAPILLARY BLOOD GLUCOSE      HOSPITAL MEDICATIONS:  MEDICATIONS  (STANDING):  ALBUTerol    0.083% 2.5 milliGRAM(s) Nebulizer every 6 hours  ALBUTerol    90 MICROgram(s) HFA Inhaler 1 Puff(s) Inhalation every 4 hours  chlorhexidine 4% Liquid 1 Application(s) Topical <User Schedule>  Clobazam Suspension 10 milliGRAM(s) Oral two times a day  enoxaparin Injectable 40 milliGRAM(s) SubCutaneous daily  lacosamide Solution 100 milliGRAM(s) Oral <User Schedule>  levETIRAcetam  IVPB 1000 milliGRAM(s) IV Intermittent <User Schedule>  midodrine 5 milliGRAM(s) Oral every 8 hours  OLANZapine 7.5 milliGRAM(s) Oral at bedtime  sodium chloride 3%  Inhalation 4 milliLiter(s) Inhalation every 6 hours  valproate sodium IVPB 750 milliGRAM(s) IV Intermittent every 8 hours    MEDICATIONS  (PRN):  sodium chloride 0.9% lock flush 10 milliLiter(s) IV Push every 1 hour PRN Pre/post blood products, medications, blood draw, and to maintain line patency      LABS:                        8.9    6.29  )-----------( 297      ( 08 Sep 2019 06:45 )             29.5

## 2019-09-08 NOTE — PROGRESS NOTE ADULT - ATTENDING COMMENTS
remains stable  speech and swallow tomorrow, with group home  if unable to eat, will need to d/w mother peg  taper midodrine

## 2019-09-08 NOTE — PROGRESS NOTE ADULT - PROBLEM SELECTOR PLAN 6
- s/p pressors in MICU  - now on midodrine, will wean as tolerated - s/p pressors in MICU   BP stable, will decrease midodrine to 2.5 mg TID, plan to wean off as tolerated

## 2019-09-09 PROCEDURE — 99232 SBSQ HOSP IP/OBS MODERATE 35: CPT | Mod: GC

## 2019-09-09 RX ADMIN — MIDODRINE HYDROCHLORIDE 2.5 MILLIGRAM(S): 2.5 TABLET ORAL at 05:52

## 2019-09-09 RX ADMIN — LACOSAMIDE 100 MILLIGRAM(S): 50 TABLET ORAL at 13:13

## 2019-09-09 RX ADMIN — LACOSAMIDE 100 MILLIGRAM(S): 50 TABLET ORAL at 21:50

## 2019-09-09 RX ADMIN — SODIUM CHLORIDE 4 MILLILITER(S): 9 INJECTION INTRAMUSCULAR; INTRAVENOUS; SUBCUTANEOUS at 10:38

## 2019-09-09 RX ADMIN — ALBUTEROL 2.5 MILLIGRAM(S): 90 AEROSOL, METERED ORAL at 22:40

## 2019-09-09 RX ADMIN — ALBUTEROL 2.5 MILLIGRAM(S): 90 AEROSOL, METERED ORAL at 03:52

## 2019-09-09 RX ADMIN — ALBUTEROL 2.5 MILLIGRAM(S): 90 AEROSOL, METERED ORAL at 10:38

## 2019-09-09 RX ADMIN — LEVETIRACETAM 400 MILLIGRAM(S): 250 TABLET, FILM COATED ORAL at 08:21

## 2019-09-09 RX ADMIN — CLOBAZAM 10 MILLIGRAM(S): 10 TABLET ORAL at 18:32

## 2019-09-09 RX ADMIN — ALBUTEROL 2.5 MILLIGRAM(S): 90 AEROSOL, METERED ORAL at 15:59

## 2019-09-09 RX ADMIN — Medication 28.75 MILLIGRAM(S): at 02:29

## 2019-09-09 RX ADMIN — SODIUM CHLORIDE 4 MILLILITER(S): 9 INJECTION INTRAMUSCULAR; INTRAVENOUS; SUBCUTANEOUS at 22:45

## 2019-09-09 RX ADMIN — CHLORHEXIDINE GLUCONATE 1 APPLICATION(S): 213 SOLUTION TOPICAL at 08:22

## 2019-09-09 RX ADMIN — LEVETIRACETAM 400 MILLIGRAM(S): 250 TABLET, FILM COATED ORAL at 13:13

## 2019-09-09 RX ADMIN — LACOSAMIDE 100 MILLIGRAM(S): 50 TABLET ORAL at 05:52

## 2019-09-09 RX ADMIN — CLOBAZAM 10 MILLIGRAM(S): 10 TABLET ORAL at 05:52

## 2019-09-09 RX ADMIN — LEVETIRACETAM 400 MILLIGRAM(S): 250 TABLET, FILM COATED ORAL at 20:55

## 2019-09-09 RX ADMIN — Medication 28.75 MILLIGRAM(S): at 18:08

## 2019-09-09 RX ADMIN — SODIUM CHLORIDE 4 MILLILITER(S): 9 INJECTION INTRAMUSCULAR; INTRAVENOUS; SUBCUTANEOUS at 03:58

## 2019-09-09 RX ADMIN — SODIUM CHLORIDE 4 MILLILITER(S): 9 INJECTION INTRAMUSCULAR; INTRAVENOUS; SUBCUTANEOUS at 15:59

## 2019-09-09 RX ADMIN — Medication 28.75 MILLIGRAM(S): at 10:25

## 2019-09-09 RX ADMIN — OLANZAPINE 7.5 MILLIGRAM(S): 15 TABLET, FILM COATED ORAL at 21:50

## 2019-09-09 RX ADMIN — ENOXAPARIN SODIUM 40 MILLIGRAM(S): 100 INJECTION SUBCUTANEOUS at 12:00

## 2019-09-09 NOTE — PROGRESS NOTE ADULT - PROBLEM SELECTOR PLAN 3
- hypothermia overnight and mild hypotension, both now improved   - follow blood and sputum cultures  - monitor off abx for now - chronic hypothermia  - sputum cx with MRSA, but without leukocytosis, will monitor off abx for now  - blood cultures negative

## 2019-09-09 NOTE — PROGRESS NOTE ADULT - ASSESSMENT
54F with a PMH of cerebral palsy and seizure disorder presenting from [facility] home for acute AMS and increased work of breathing with recent seizure found to be hypothermic, hypotensive and bradycardic with leukocytosis and imaging showing new right lung opacification concerning for septic shock 2/2 to aspiration PNA requiring pressors and airway support, intermittently requiring pressors, seizures controlled with 4 AEDs.   Now extubated, off pressors, on midodrine.

## 2019-09-09 NOTE — PROGRESS NOTE ADULT - SUBJECTIVE AND OBJECTIVE BOX
CHIEF COMPLAINT:    Interval Events: hypothermic overnight, sputum culture + MRSA        OBJECTIVE:  ICU Vital Signs Last 24 Hrs  T(C): 35.3 (09 Sep 2019 08:00), Max: 36.6 (08 Sep 2019 13:52)  T(F): 95.6 (09 Sep 2019 08:00), Max: 97.8 (08 Sep 2019 13:52)  HR: 80 (09 Sep 2019 10:38) (70 - 96)  BP: 141/86 (09 Sep 2019 08:00) (96/53 - 141/86)    RR: 18 (09 Sep 2019 08:00) (18 - 20)  SpO2: 95% (09 Sep 2019 08:00) (95% - 100%)        09-08 @ 07:01  -  09-09 @ 07:00  --------------------------------------------------------  IN: 1350 mL / OUT: 1000 mL / NET: 350 mL      CAPILLARY BLOOD GLUCOSE        HOSPITAL MEDICATIONS:  MEDICATIONS  (STANDING):  ALBUTerol    0.083% 2.5 milliGRAM(s) Nebulizer every 6 hours  ALBUTerol    90 MICROgram(s) HFA Inhaler 1 Puff(s) Inhalation every 4 hours  chlorhexidine 4% Liquid 1 Application(s) Topical <User Schedule>  Clobazam Suspension 10 milliGRAM(s) Oral two times a day  enoxaparin Injectable 40 milliGRAM(s) SubCutaneous daily  lacosamide Solution 100 milliGRAM(s) Oral <User Schedule>  levETIRAcetam  IVPB 1000 milliGRAM(s) IV Intermittent <User Schedule>  midodrine 2.5 milliGRAM(s) Oral every 8 hours  OLANZapine 7.5 milliGRAM(s) Oral at bedtime  sodium chloride 3%  Inhalation 4 milliLiter(s) Inhalation every 6 hours  valproate sodium IVPB 750 milliGRAM(s) IV Intermittent every 8 hours    MEDICATIONS  (PRN):  sodium chloride 0.9% lock flush 10 milliLiter(s) IV Push every 1 hour PRN Pre/post blood products, medications, blood draw, and to maintain line patency      LABS:                        8.9    6.29  )-----------( 297      ( 08 Sep 2019 06:45 )             29.5     09-08    146<H>  |  104  |  22  ----------------------------<  108<H>  4.6   |  33<H>  |  0.95    Ca    9.1      08 Sep 2019 06:45  Phos  3.7     09-08  Mg     2.2     09-08 CHIEF COMPLAINT:    Interval Events: hypothermic overnight, sputum culture + MRSA        OBJECTIVE:  ICU Vital Signs Last 24 Hrs  T(C): 35.3 (09 Sep 2019 08:00), Max: 36.6 (08 Sep 2019 13:52)  T(F): 95.6 (09 Sep 2019 08:00), Max: 97.8 (08 Sep 2019 13:52)  HR: 80 (09 Sep 2019 10:38) (70 - 96)  BP: 141/86 (09 Sep 2019 08:00) (96/53 - 141/86)    RR: 18 (09 Sep 2019 08:00) (18 - 20)  SpO2: 95% (09 Sep 2019 08:00) (95% - 100%)    09-08 @ 07:01  -  09-09 @ 07:00  --------------------------------------------------------  IN: 1350 mL / OUT: 1000 mL / NET: 350 mL    HOSPITAL MEDICATIONS:  MEDICATIONS  (STANDING):  ALBUTerol    0.083% 2.5 milliGRAM(s) Nebulizer every 6 hours  ALBUTerol    90 MICROgram(s) HFA Inhaler 1 Puff(s) Inhalation every 4 hours  chlorhexidine 4% Liquid 1 Application(s) Topical <User Schedule>  Clobazam Suspension 10 milliGRAM(s) Oral two times a day  enoxaparin Injectable 40 milliGRAM(s) SubCutaneous daily  lacosamide Solution 100 milliGRAM(s) Oral <User Schedule>  levETIRAcetam  IVPB 1000 milliGRAM(s) IV Intermittent <User Schedule>  midodrine 2.5 milliGRAM(s) Oral every 8 hours  OLANZapine 7.5 milliGRAM(s) Oral at bedtime  sodium chloride 3%  Inhalation 4 milliLiter(s) Inhalation every 6 hours  valproate sodium IVPB 750 milliGRAM(s) IV Intermittent every 8 hours    MEDICATIONS  (PRN):  sodium chloride 0.9% lock flush 10 milliLiter(s) IV Push every 1 hour PRN Pre/post blood products, medications, blood draw, and to maintain line patency CHIEF COMPLAINT: failed speech/swallow    Interval Events: hypothermic overnight, sputum culture + MRSA    OBJECTIVE:  ICU Vital Signs Last 24 Hrs  T(C): 35.3 (09 Sep 2019 08:00), Max: 36.6 (08 Sep 2019 13:52)  T(F): 95.6 (09 Sep 2019 08:00), Max: 97.8 (08 Sep 2019 13:52)  HR: 80 (09 Sep 2019 10:38) (70 - 96)  BP: 141/86 (09 Sep 2019 08:00) (96/53 - 141/86)    RR: 18 (09 Sep 2019 08:00) (18 - 20)  SpO2: 95% (09 Sep 2019 08:00) (95% - 100%)    09-08 @ 07:01  -  09-09 @ 07:00  --------------------------------------------------------  IN: 1350 mL / OUT: 1000 mL / NET: 350 mL    HOSPITAL MEDICATIONS:  MEDICATIONS  (STANDING):  ALBUTerol    0.083% 2.5 milliGRAM(s) Nebulizer every 6 hours  ALBUTerol    90 MICROgram(s) HFA Inhaler 1 Puff(s) Inhalation every 4 hours  chlorhexidine 4% Liquid 1 Application(s) Topical <User Schedule>  Clobazam Suspension 10 milliGRAM(s) Oral two times a day  enoxaparin Injectable 40 milliGRAM(s) SubCutaneous daily  lacosamide Solution 100 milliGRAM(s) Oral <User Schedule>  levETIRAcetam  IVPB 1000 milliGRAM(s) IV Intermittent <User Schedule>  midodrine 2.5 milliGRAM(s) Oral every 8 hours  OLANZapine 7.5 milliGRAM(s) Oral at bedtime  sodium chloride 3%  Inhalation 4 milliLiter(s) Inhalation every 6 hours  valproate sodium IVPB 750 milliGRAM(s) IV Intermittent every 8 hours    MEDICATIONS  (PRN):  sodium chloride 0.9% lock flush 10 milliLiter(s) IV Push every 1 hour PRN Pre/post blood products, medications, blood draw, and to maintain line patency

## 2019-09-09 NOTE — PROGRESS NOTE ADULT - ATTENDING COMMENTS
plan to attempt swallow evaluation with the staff from the [facility]idence and speech and swallow  If fails, will need to discuss PEG

## 2019-09-09 NOTE — SWALLOW BEDSIDE ASSESSMENT ADULT - ASR SWALLOW ASPIRATION MONITOR
change of breathing pattern/oral hygiene/position upright (90Y)/cough/gurgly voice/fever/pneumonia/throat clearing/upper respiratory infection
change of breathing pattern/oral hygiene/position upright (90Y)/cough/gurgly voice/fever/pneumonia/throat clearing/upper respiratory infection

## 2019-09-09 NOTE — SWALLOW BEDSIDE ASSESSMENT ADULT - COMMENTS
Team requesting pt be seen with HHA present bedside.  Nurse reports not yet arrived.  Will re-attempt as schedule permits.

## 2019-09-09 NOTE — PROGRESS NOTE ADULT - PROBLEM SELECTOR PLAN 6
- s/p pressors in MICU   BP stable, will decrease midodrine to 2.5 mg TID, plan to wean off as tolerated - s/p pressors in MICU  - BP stable, will d/c midodrine today

## 2019-09-09 NOTE — PROGRESS NOTE ADULT - PROBLEM SELECTOR PLAN 5
- with NGT currently, tolerating  - bedside swallow evaluation deferred until  9/9/19, with group home staff member present as patient would not take food - with NGT currently, tolerating  - failed S&S today even with group home at bedside for assistance  - will need to DW family regarding plan for alternative feeding

## 2019-09-09 NOTE — SWALLOW BEDSIDE ASSESSMENT ADULT - SLP PERTINENT HISTORY OF CURRENT PROBLEM
Per charting, 54F with a PMH of cerebral palsy and seizure disorder presenting from group home for acute AMS and increased work of breathing with recent seizure found to be hypothermic, hypotensive and bradycardic with leukocytosis and imaging showing new right lung opacification concerning for septic shock 2/2 to aspiration PNA requiring pressors and airway support, intermittently requiring pressors, seizures controlled with 4 AEDs.
Per charting, 54F with a PMH of cerebral palsy and seizure disorder presenting from group home for acute AMS and increased work of breathing with recent seizure found to be hypothermic, hypotensive and bradycardic with leukocytosis and imaging showing new right lung opacification concerning for septic shock 2/2 to aspiration PNA requiring pressors and airway support, intermittently requiring pressors, seizures controlled with 4 AEDs.

## 2019-09-09 NOTE — SWALLOW BEDSIDE ASSESSMENT ADULT - COMMENTS
Pt received upright in bed, awake, NGT in place. Pt's aide present and administered PO trials. Per charting, pt is nonverbal at baseline. Pt did not follow simple commands, but was receptive to PO trials administered by aide.    Of note, initial clinical swallow assessment completed 9/5, at which time pt was not orally accepting PO, therefore, unable to recommend a PO diet. Team requesting swallow reassessment to determine PO candidacy.

## 2019-09-10 LAB
ANION GAP SERPL CALC-SCNC: 12 MMO/L — SIGNIFICANT CHANGE UP (ref 7–14)
BUN SERPL-MCNC: 28 MG/DL — HIGH (ref 7–23)
CALCIUM SERPL-MCNC: 9.1 MG/DL — SIGNIFICANT CHANGE UP (ref 8.4–10.5)
CHLORIDE SERPL-SCNC: 103 MMOL/L — SIGNIFICANT CHANGE UP (ref 98–107)
CO2 SERPL-SCNC: 30 MMOL/L — SIGNIFICANT CHANGE UP (ref 22–31)
CREAT SERPL-MCNC: 0.91 MG/DL — SIGNIFICANT CHANGE UP (ref 0.5–1.3)
GLUCOSE BLDC GLUCOMTR-MCNC: 99 MG/DL — SIGNIFICANT CHANGE UP (ref 70–99)
GLUCOSE SERPL-MCNC: 89 MG/DL — SIGNIFICANT CHANGE UP (ref 70–99)
HCT VFR BLD CALC: 31.2 % — LOW (ref 34.5–45)
HGB BLD-MCNC: 9.2 G/DL — LOW (ref 11.5–15.5)
MAGNESIUM SERPL-MCNC: 2.1 MG/DL — SIGNIFICANT CHANGE UP (ref 1.6–2.6)
MCHC RBC-ENTMCNC: 29.5 % — LOW (ref 32–36)
MCHC RBC-ENTMCNC: 32.6 PG — SIGNIFICANT CHANGE UP (ref 27–34)
MCV RBC AUTO: 110.6 FL — HIGH (ref 80–100)
MISCELLANEOUS - CHEM: SIGNIFICANT CHANGE UP
NRBC # FLD: 0 K/UL — SIGNIFICANT CHANGE UP (ref 0–0)
PHOSPHATE SERPL-MCNC: 4.8 MG/DL — HIGH (ref 2.5–4.5)
PLATELET # BLD AUTO: 260 K/UL — SIGNIFICANT CHANGE UP (ref 150–400)
PMV BLD: 10 FL — SIGNIFICANT CHANGE UP (ref 7–13)
POTASSIUM SERPL-MCNC: 4.8 MMOL/L — SIGNIFICANT CHANGE UP (ref 3.5–5.3)
POTASSIUM SERPL-SCNC: 4.8 MMOL/L — SIGNIFICANT CHANGE UP (ref 3.5–5.3)
RBC # BLD: 2.82 M/UL — LOW (ref 3.8–5.2)
RBC # FLD: 14.9 % — HIGH (ref 10.3–14.5)
SODIUM SERPL-SCNC: 145 MMOL/L — SIGNIFICANT CHANGE UP (ref 135–145)
WBC # BLD: 5.88 K/UL — SIGNIFICANT CHANGE UP (ref 3.8–10.5)
WBC # FLD AUTO: 5.88 K/UL — SIGNIFICANT CHANGE UP (ref 3.8–10.5)

## 2019-09-10 PROCEDURE — 93010 ELECTROCARDIOGRAM REPORT: CPT

## 2019-09-10 PROCEDURE — 99232 SBSQ HOSP IP/OBS MODERATE 35: CPT | Mod: GC

## 2019-09-10 RX ORDER — SODIUM CHLORIDE 9 MG/ML
500 INJECTION, SOLUTION INTRAVENOUS ONCE
Refills: 0 | Status: COMPLETED | OUTPATIENT
Start: 2019-09-10 | End: 2019-09-10

## 2019-09-10 RX ADMIN — ENOXAPARIN SODIUM 40 MILLIGRAM(S): 100 INJECTION SUBCUTANEOUS at 13:48

## 2019-09-10 RX ADMIN — LACOSAMIDE 100 MILLIGRAM(S): 50 TABLET ORAL at 13:49

## 2019-09-10 RX ADMIN — OLANZAPINE 7.5 MILLIGRAM(S): 15 TABLET, FILM COATED ORAL at 21:12

## 2019-09-10 RX ADMIN — LEVETIRACETAM 400 MILLIGRAM(S): 250 TABLET, FILM COATED ORAL at 13:48

## 2019-09-10 RX ADMIN — Medication 28.75 MILLIGRAM(S): at 18:37

## 2019-09-10 RX ADMIN — CHLORHEXIDINE GLUCONATE 1 APPLICATION(S): 213 SOLUTION TOPICAL at 05:07

## 2019-09-10 RX ADMIN — LEVETIRACETAM 400 MILLIGRAM(S): 250 TABLET, FILM COATED ORAL at 08:16

## 2019-09-10 RX ADMIN — ALBUTEROL 2.5 MILLIGRAM(S): 90 AEROSOL, METERED ORAL at 09:26

## 2019-09-10 RX ADMIN — LACOSAMIDE 100 MILLIGRAM(S): 50 TABLET ORAL at 21:12

## 2019-09-10 RX ADMIN — ALBUTEROL 2.5 MILLIGRAM(S): 90 AEROSOL, METERED ORAL at 15:10

## 2019-09-10 RX ADMIN — SODIUM CHLORIDE 4 MILLILITER(S): 9 INJECTION INTRAMUSCULAR; INTRAVENOUS; SUBCUTANEOUS at 22:32

## 2019-09-10 RX ADMIN — CLOBAZAM 10 MILLIGRAM(S): 10 TABLET ORAL at 05:07

## 2019-09-10 RX ADMIN — SODIUM CHLORIDE 4 MILLILITER(S): 9 INJECTION INTRAMUSCULAR; INTRAVENOUS; SUBCUTANEOUS at 09:26

## 2019-09-10 RX ADMIN — ALBUTEROL 2.5 MILLIGRAM(S): 90 AEROSOL, METERED ORAL at 22:32

## 2019-09-10 RX ADMIN — Medication 28.75 MILLIGRAM(S): at 01:22

## 2019-09-10 RX ADMIN — LACOSAMIDE 100 MILLIGRAM(S): 50 TABLET ORAL at 05:06

## 2019-09-10 RX ADMIN — Medication 28.75 MILLIGRAM(S): at 11:30

## 2019-09-10 RX ADMIN — LEVETIRACETAM 400 MILLIGRAM(S): 250 TABLET, FILM COATED ORAL at 20:52

## 2019-09-10 RX ADMIN — CLOBAZAM 10 MILLIGRAM(S): 10 TABLET ORAL at 18:50

## 2019-09-10 RX ADMIN — ALBUTEROL 2.5 MILLIGRAM(S): 90 AEROSOL, METERED ORAL at 04:39

## 2019-09-10 RX ADMIN — SODIUM CHLORIDE 4 MILLILITER(S): 9 INJECTION INTRAMUSCULAR; INTRAVENOUS; SUBCUTANEOUS at 15:10

## 2019-09-10 RX ADMIN — SODIUM CHLORIDE 4 MILLILITER(S): 9 INJECTION INTRAMUSCULAR; INTRAVENOUS; SUBCUTANEOUS at 04:45

## 2019-09-10 RX ADMIN — SODIUM CHLORIDE 1000 MILLILITER(S): 9 INJECTION, SOLUTION INTRAVENOUS at 12:18

## 2019-09-10 NOTE — PROGRESS NOTE ADULT - SUBJECTIVE AND OBJECTIVE BOX
CHIEF COMPLAINT:    Interval Events:    REVIEW OF SYSTEMS:  Constitutional:   Eyes:  ENT:  CV:  Resp:  GI:  :  MSK:  Integumentary:  Neurological:  Psychiatric:  Endocrine:  Hematologic/Lymphatic:  Allergic/Immunologic:  [ ] All other systems negative  [ ] Unable to assess ROS because ________    OBJECTIVE:  ICU Vital Signs Last 24 Hrs  T(C): 37.4 (10 Sep 2019 13:47), Max: 37.4 (10 Sep 2019 13:47)  T(F): 99.3 (10 Sep 2019 13:47), Max: 99.3 (10 Sep 2019 13:47)  HR: 114 (10 Sep 2019 13:47) (72 - 136)  BP: 102/49 (10 Sep 2019 13:47) (102/49 - 128/76)  BP(mean): --  ABP: --  ABP(mean): --  RR: 24 (10 Sep 2019 13:47) (18 - 26)  SpO2: 98% (10 Sep 2019 13:47) (94% - 100%)        09-09 @ 07:01  -  09-10 @ 07:00  --------------------------------------------------------  IN: 1800 mL / OUT: 300 mL / NET: 1500 mL      CAPILLARY BLOOD GLUCOSE      POCT Blood Glucose.: 99 mg/dL (10 Sep 2019 10:43)      PHYSICAL EXAM:  General:   HEENT:   Lymph Nodes:  Neck:   Respiratory:   Cardiovascular:   Abdomen:   Extremities:   Skin:   Neurological:  Psychiatry:    HOSPITAL MEDICATIONS:  MEDICATIONS  (STANDING):  ALBUTerol    0.083% 2.5 milliGRAM(s) Nebulizer every 6 hours  ALBUTerol    90 MICROgram(s) HFA Inhaler 1 Puff(s) Inhalation every 4 hours  chlorhexidine 4% Liquid 1 Application(s) Topical <User Schedule>  Clobazam Suspension 10 milliGRAM(s) Oral two times a day  enoxaparin Injectable 40 milliGRAM(s) SubCutaneous daily  lacosamide Solution 100 milliGRAM(s) Oral <User Schedule>  levETIRAcetam  IVPB 1000 milliGRAM(s) IV Intermittent <User Schedule>  OLANZapine 7.5 milliGRAM(s) Oral at bedtime  sodium chloride 3%  Inhalation 4 milliLiter(s) Inhalation every 6 hours  valproate sodium IVPB 750 milliGRAM(s) IV Intermittent every 8 hours    MEDICATIONS  (PRN):  sodium chloride 0.9% lock flush 10 milliLiter(s) IV Push every 1 hour PRN Pre/post blood products, medications, blood draw, and to maintain line patency      LABS:                        9.2    5.88  )-----------( 260      ( 10 Sep 2019 06:17 )             31.2     09-10    145  |  103  |  28<H>  ----------------------------<  89  4.8   |  30  |  0.91    Ca    9.1      10 Sep 2019 06:17  Phos  4.8     09-10  Mg     2.1     09-10                MICROBIOLOGY:     RADIOLOGY:  [ ] Reviewed and interpreted by me    PULMONARY FUNCTION TESTS:    EKG: CHIEF COMPLAINT: unable to obtain 2/2 pt non verbal at baseline    Interval Events: pt hypothermic overnight    REVIEW OF SYSTEMS:  Constitutional:   Eyes:  ENT:  CV:  Resp:  GI:  :  MSK:  Integumentary:  Neurological:  Psychiatric:  Endocrine:  Hematologic/Lymphatic:  Allergic/Immunologic:  [ ] All other systems negative  [x ] Unable to assess ROS because pt non verbal at baseline    OBJECTIVE:  ICU Vital Signs Last 24 Hrs  T(C): 37.4 (10 Sep 2019 13:47), Max: 37.4 (10 Sep 2019 13:47)  T(F): 99.3 (10 Sep 2019 13:47), Max: 99.3 (10 Sep 2019 13:47)  HR: 114 (10 Sep 2019 13:47) (72 - 136)  BP: 102/49 (10 Sep 2019 13:47) (102/49 - 128/76)  BP(mean): --  ABP: --  ABP(mean): --  RR: 24 (10 Sep 2019 13:47) (18 - 26)  SpO2: 98% (10 Sep 2019 13:47) (94% - 100%)        09-09 @ 07:01  -  09-10 @ 07:00  --------------------------------------------------------  IN: 1800 mL / OUT: 300 mL / NET: 1500 mL      CAPILLARY BLOOD GLUCOSE      POCT Blood Glucose.: 99 mg/dL (10 Sep 2019 10:43)    HOSPITAL MEDICATIONS:  MEDICATIONS  (STANDING):  ALBUTerol    0.083% 2.5 milliGRAM(s) Nebulizer every 6 hours  ALBUTerol    90 MICROgram(s) HFA Inhaler 1 Puff(s) Inhalation every 4 hours  chlorhexidine 4% Liquid 1 Application(s) Topical <User Schedule>  Clobazam Suspension 10 milliGRAM(s) Oral two times a day  enoxaparin Injectable 40 milliGRAM(s) SubCutaneous daily  lacosamide Solution 100 milliGRAM(s) Oral <User Schedule>  levETIRAcetam  IVPB 1000 milliGRAM(s) IV Intermittent <User Schedule>  OLANZapine 7.5 milliGRAM(s) Oral at bedtime  sodium chloride 3%  Inhalation 4 milliLiter(s) Inhalation every 6 hours  valproate sodium IVPB 750 milliGRAM(s) IV Intermittent every 8 hours    MEDICATIONS  (PRN):  sodium chloride 0.9% lock flush 10 milliLiter(s) IV Push every 1 hour PRN Pre/post blood products, medications, blood draw, and to maintain line patency      LABS:                        9.2    5.88  )-----------( 260      ( 10 Sep 2019 06:17 )             31.2     09-10    145  |  103  |  28<H>  ----------------------------<  89  4.8   |  30  |  0.91    Ca    9.1      10 Sep 2019 06:17  Phos  4.8     09-10  Mg     2.1     09-10                MICROBIOLOGY:     RADIOLOGY:  [ ] Reviewed and interpreted by me    PULMONARY FUNCTION TESTS:    EKG:

## 2019-09-10 NOTE — PROGRESS NOTE ADULT - PROBLEM SELECTOR PLAN 5
- with NGT currently, tolerating  - failed S&S today even with group home at bedside for assistance  - mom deciding on PEG tube

## 2019-09-10 NOTE — PROGRESS NOTE ADULT - ATTENDING COMMENTS
Mother is considering PEG - would also need to be transferred to NH then , could not go back to current residence  Awaiting decision  Remains on enhance care - pulls at [facility]  Stable for tx to [facility]N

## 2019-09-10 NOTE — PROGRESS NOTE ADULT - ASSESSMENT
54F with a PMH of cerebral palsy and seizure disorder [facility] and increased work of breathing with recent seizure found to be hypothermic, hypotensive and bradycardic with leukocytosis and imaging showing new right lung opacification concerning for septic shock 2/2 to aspiration PNA requiring pressors and airway support, intermittently requiring pressors, seizures controlled with 4 AEDs.   Now extubated, off pressors, on midodrine.

## 2019-09-10 NOTE — PROGRESS NOTE ADULT - PROBLEM SELECTOR PLAN 3
- chronic hypothermia- normothermic at present  - sputum cx with MRSA, but without leukocytosis, will monitor off abx for now  - blood cultures negative

## 2019-09-11 LAB
ANION GAP SERPL CALC-SCNC: 11 MMO/L — SIGNIFICANT CHANGE UP (ref 7–14)
BACTERIA BLD CULT: SIGNIFICANT CHANGE UP
BUN SERPL-MCNC: 29 MG/DL — HIGH (ref 7–23)
CALCIUM SERPL-MCNC: 9.2 MG/DL — SIGNIFICANT CHANGE UP (ref 8.4–10.5)
CHLORIDE SERPL-SCNC: 103 MMOL/L — SIGNIFICANT CHANGE UP (ref 98–107)
CO2 SERPL-SCNC: 32 MMOL/L — HIGH (ref 22–31)
CREAT SERPL-MCNC: 1.09 MG/DL — SIGNIFICANT CHANGE UP (ref 0.5–1.3)
GLUCOSE BLDC GLUCOMTR-MCNC: 105 MG/DL — HIGH (ref 70–99)
GLUCOSE BLDC GLUCOMTR-MCNC: 95 MG/DL — SIGNIFICANT CHANGE UP (ref 70–99)
GLUCOSE SERPL-MCNC: 98 MG/DL — SIGNIFICANT CHANGE UP (ref 70–99)
HCT VFR BLD CALC: 28.1 % — LOW (ref 34.5–45)
HGB BLD-MCNC: 8.6 G/DL — LOW (ref 11.5–15.5)
MAGNESIUM SERPL-MCNC: 2.1 MG/DL — SIGNIFICANT CHANGE UP (ref 1.6–2.6)
MCHC RBC-ENTMCNC: 30.6 % — LOW (ref 32–36)
MCHC RBC-ENTMCNC: 33.2 PG — SIGNIFICANT CHANGE UP (ref 27–34)
MCV RBC AUTO: 108.5 FL — HIGH (ref 80–100)
NRBC # FLD: 0 K/UL — SIGNIFICANT CHANGE UP (ref 0–0)
PHOSPHATE SERPL-MCNC: 5 MG/DL — HIGH (ref 2.5–4.5)
PLATELET # BLD AUTO: 260 K/UL — SIGNIFICANT CHANGE UP (ref 150–400)
PMV BLD: 9.8 FL — SIGNIFICANT CHANGE UP (ref 7–13)
POTASSIUM SERPL-MCNC: 4.8 MMOL/L — SIGNIFICANT CHANGE UP (ref 3.5–5.3)
POTASSIUM SERPL-SCNC: 4.8 MMOL/L — SIGNIFICANT CHANGE UP (ref 3.5–5.3)
RBC # BLD: 2.59 M/UL — LOW (ref 3.8–5.2)
RBC # FLD: 15.1 % — HIGH (ref 10.3–14.5)
SODIUM SERPL-SCNC: 146 MMOL/L — HIGH (ref 135–145)
WBC # BLD: 5.25 K/UL — SIGNIFICANT CHANGE UP (ref 3.8–10.5)
WBC # FLD AUTO: 5.25 K/UL — SIGNIFICANT CHANGE UP (ref 3.8–10.5)

## 2019-09-11 PROCEDURE — 99232 SBSQ HOSP IP/OBS MODERATE 35: CPT | Mod: GC

## 2019-09-11 RX ADMIN — OLANZAPINE 7.5 MILLIGRAM(S): 15 TABLET, FILM COATED ORAL at 22:42

## 2019-09-11 RX ADMIN — SODIUM CHLORIDE 4 MILLILITER(S): 9 INJECTION INTRAMUSCULAR; INTRAVENOUS; SUBCUTANEOUS at 09:49

## 2019-09-11 RX ADMIN — ALBUTEROL 2.5 MILLIGRAM(S): 90 AEROSOL, METERED ORAL at 22:05

## 2019-09-11 RX ADMIN — CHLORHEXIDINE GLUCONATE 1 APPLICATION(S): 213 SOLUTION TOPICAL at 06:20

## 2019-09-11 RX ADMIN — Medication 28.75 MILLIGRAM(S): at 02:05

## 2019-09-11 RX ADMIN — Medication 28.75 MILLIGRAM(S): at 10:40

## 2019-09-11 RX ADMIN — ALBUTEROL 2.5 MILLIGRAM(S): 90 AEROSOL, METERED ORAL at 09:49

## 2019-09-11 RX ADMIN — SODIUM CHLORIDE 4 MILLILITER(S): 9 INJECTION INTRAMUSCULAR; INTRAVENOUS; SUBCUTANEOUS at 16:13

## 2019-09-11 RX ADMIN — LACOSAMIDE 100 MILLIGRAM(S): 50 TABLET ORAL at 15:50

## 2019-09-11 RX ADMIN — ENOXAPARIN SODIUM 40 MILLIGRAM(S): 100 INJECTION SUBCUTANEOUS at 12:16

## 2019-09-11 RX ADMIN — LACOSAMIDE 100 MILLIGRAM(S): 50 TABLET ORAL at 22:42

## 2019-09-11 RX ADMIN — CLOBAZAM 10 MILLIGRAM(S): 10 TABLET ORAL at 06:21

## 2019-09-11 RX ADMIN — CLOBAZAM 10 MILLIGRAM(S): 10 TABLET ORAL at 19:56

## 2019-09-11 RX ADMIN — SODIUM CHLORIDE 4 MILLILITER(S): 9 INJECTION INTRAMUSCULAR; INTRAVENOUS; SUBCUTANEOUS at 22:10

## 2019-09-11 RX ADMIN — LACOSAMIDE 100 MILLIGRAM(S): 50 TABLET ORAL at 06:12

## 2019-09-11 RX ADMIN — LEVETIRACETAM 400 MILLIGRAM(S): 250 TABLET, FILM COATED ORAL at 09:39

## 2019-09-11 RX ADMIN — LEVETIRACETAM 400 MILLIGRAM(S): 250 TABLET, FILM COATED ORAL at 15:00

## 2019-09-11 RX ADMIN — Medication 28.75 MILLIGRAM(S): at 19:56

## 2019-09-11 RX ADMIN — ALBUTEROL 2.5 MILLIGRAM(S): 90 AEROSOL, METERED ORAL at 16:13

## 2019-09-11 RX ADMIN — LEVETIRACETAM 400 MILLIGRAM(S): 250 TABLET, FILM COATED ORAL at 22:42

## 2019-09-11 NOTE — CHART NOTE - NSCHARTNOTEFT_GEN_A_CORE
Source: Patient [ ]    Family [ ]     other [ x] RN, chart review     Pt is alert, unable to speak. 53 y/o F with CP presenting from group home with acute resp failure 2/2 aspiration PNA. Per RN, pt with flexiseal 2/2 diarrhea. Pt failed speech and swallow 9/9- per chart, pending family decision for PEG. Pt currently at goal rate of Jevity 1.2.     Diet, NPO with Tube Feed:   Tube Feeding Modality: Nasogastric  Jevity 1.2 Shady (JEVITY1.2RTH)  Total Volume for 24 Hours (mL): 1200  Continuous  Starting Tube Feed Rate {mL per Hour}: 10  Increase Tube Feed Rate by (mL): 10     Every 4 hours  Until Goal Tube Feed Rate (mL per Hour): 50  Tube Feed Duration (in Hours): 24  Tube Feed Start Time: 08:00  No Carb Prosource (1pkg = 15gms Protein)     Qty per Day:  1 (09-04-19 @ 14:44)    Enteral /Parenteral Nutrition: 1200 mL, 1440 shady, 67 gm protein. No-carb Prosource 1x daily (additional 15 gm protein)  Current Weight:   9/11: 74 kg  9/5: 73.8 kg  8/27: 80.3 kg  8/20: 71.4 kg     Edema: 1+ L hand   Pressure Injuries: none noted     __________________ Pertinent Medications__________________   MEDICATIONS  (STANDING):  ALBUTerol    0.083% 2.5 milliGRAM(s) Nebulizer every 6 hours  ALBUTerol    90 MICROgram(s) HFA Inhaler 1 Puff(s) Inhalation every 4 hours  chlorhexidine 4% Liquid 1 Application(s) Topical <User Schedule>  Clobazam Suspension 10 milliGRAM(s) Oral two times a day  enoxaparin Injectable 40 milliGRAM(s) SubCutaneous daily  lacosamide Solution 100 milliGRAM(s) Oral <User Schedule>  levETIRAcetam  IVPB 1000 milliGRAM(s) IV Intermittent <User Schedule>  OLANZapine 7.5 milliGRAM(s) Oral at bedtime  sodium chloride 3%  Inhalation 4 milliLiter(s) Inhalation every 6 hours  valproate sodium IVPB 750 milliGRAM(s) IV Intermittent every 8 hours    MEDICATIONS  (PRN):  sodium chloride 0.9% lock flush 10 milliLiter(s) IV Push every 1 hour PRN Pre/post blood products, medications, blood draw, and to maintain line patency      __________________ Pertinent Labs__________________   09-11 Na146 mmol/L<H> Glu 98 mg/dL K+ 4.8 mmol/L Cr  1.09 mg/dL BUN 29 mg/dL<H> 09-11 Phos 5.0 mg/dL<H> 09-05 Alb 3.0 g/dL<L>        Estimated Needs:   [x ] no change since previous assessment  [ ] recalculated:       Previous Nutrition Diagnosis: Moderate malnutrition     Nutrition Diagnosis is [ x] ongoing-meeting needs via EN   [ ] resolved [ ] not applicable       Recommendations:  1. Continue Jevity 1.2 @ 50 mL/hr x24 hrs.   2. Continue No-carb Prosource 1x daily (15 gm protein, 60 shady)           Monitoring and Evaluation:     [ x] Tolerance to diet prescription [x ] weights [x ] follow up per protocol  [ ] other:

## 2019-09-11 NOTE — PROGRESS NOTE ADULT - PROBLEM SELECTOR PLAN 2
- with concern for possible status in MICU  - VEEG with potential epileptogenic focus in the right fronto-temporal region  - no further seizure activity noted  - cont AEDs as ordered - Siezures with concern for possible status in MICU  - VEEG with potential epileptogenic focus in the right fronto-temporal region  - No further seizure activity noted. Continue on AEDs.

## 2019-09-11 NOTE — PROGRESS NOTE ADULT - SUBJECTIVE AND OBJECTIVE BOX
CHIEF COMPLAINT:    Interval Events:    REVIEW OF SYSTEMS:  Constitutional:   Eyes:  ENT:  CV:  Resp:  GI:  :  MSK:  Integumentary:  Neurological:  Psychiatric:  Endocrine:  Hematologic/Lymphatic:  Allergic/Immunologic:  [ ] All other systems negative  [ ] Unable to assess ROS because ________    OBJECTIVE:  ICU Vital Signs Last 24 Hrs  T(C): 36.8 (11 Sep 2019 05:00), Max: 37.4 (10 Sep 2019 13:47)  T(F): 98.2 (11 Sep 2019 05:00), Max: 99.3 (10 Sep 2019 13:47)  HR: 95 (11 Sep 2019 05:00) (83 - 136)  BP: 112/75 (11 Sep 2019 05:00) (100/57 - 114/62)  BP(mean): --  ABP: --  ABP(mean): --  RR: 20 (11 Sep 2019 05:00) (20 - 26)  SpO2: 98% (11 Sep 2019 05:00) (94% - 100%)        09-10 @ 07:01  -  09-11 @ 07:00  --------------------------------------------------------  IN: 1200 mL / OUT: 1900 mL / NET: -700 mL      CAPILLARY BLOOD GLUCOSE      POCT Blood Glucose.: 95 mg/dL (11 Sep 2019 08:19)      PHYSICAL EXAM:  General:   HEENT:   Lymph Nodes:  Neck:   Respiratory:   Cardiovascular:   Abdomen:   Extremities:   Skin:   Neurological:  Psychiatry:    HOSPITAL MEDICATIONS:  MEDICATIONS  (STANDING):  ALBUTerol    0.083% 2.5 milliGRAM(s) Nebulizer every 6 hours  ALBUTerol    90 MICROgram(s) HFA Inhaler 1 Puff(s) Inhalation every 4 hours  chlorhexidine 4% Liquid 1 Application(s) Topical <User Schedule>  Clobazam Suspension 10 milliGRAM(s) Oral two times a day  enoxaparin Injectable 40 milliGRAM(s) SubCutaneous daily  lacosamide Solution 100 milliGRAM(s) Oral <User Schedule>  levETIRAcetam  IVPB 1000 milliGRAM(s) IV Intermittent <User Schedule>  OLANZapine 7.5 milliGRAM(s) Oral at bedtime  sodium chloride 3%  Inhalation 4 milliLiter(s) Inhalation every 6 hours  valproate sodium IVPB 750 milliGRAM(s) IV Intermittent every 8 hours    MEDICATIONS  (PRN):  sodium chloride 0.9% lock flush 10 milliLiter(s) IV Push every 1 hour PRN Pre/post blood products, medications, blood draw, and to maintain line patency      LABS:                        8.6    5.25  )-----------( 260      ( 11 Sep 2019 06:20 )             28.1     09-11    146<H>  |  103  |  29<H>  ----------------------------<  98  4.8   |  32<H>  |  1.09    Ca    9.2      11 Sep 2019 06:20  Phos  5.0     09-11  Mg     2.1     09-11                MICROBIOLOGY:     RADIOLOGY:  [ ] Reviewed and interpreted by me    PULMONARY FUNCTION TESTS:    EKG: CHIEF COMPLAINT: Patient is a 54y old  Female who presents with a chief complaint of Septic Shock (11 Sep 2019 08:59)    Interval Events: No interval events overnight.     REVIEW OF SYSTEMS:  [ x] Unable to assess ROS because CP    OBJECTIVE:  ICU Vital Signs Last 24 Hrs  T(C): 36.8 (11 Sep 2019 05:00), Max: 37.4 (10 Sep 2019 13:47)  T(F): 98.2 (11 Sep 2019 05:00), Max: 99.3 (10 Sep 2019 13:47)  HR: 95 (11 Sep 2019 05:00) (83 - 136)  BP: 112/75 (11 Sep 2019 05:00) (100/57 - 114/62)  BP(mean): --  ABP: --  ABP(mean): --  RR: 20 (11 Sep 2019 05:00) (20 - 26)  SpO2: 98% (11 Sep 2019 05:00) (94% - 100%)    09-10 @ 07:01  -  09-11 @ 07:00  --------------------------------------------------------  IN: 1200 mL / OUT: 1900 mL / NET: -700 mL    CAPILLARY BLOOD GLUCOSE  POCT Blood Glucose.: 95 mg/dL (11 Sep 2019 08:19)    PHYSICAL EXAM:  General: NAD  Card: S1/S2   Pulm: CTA BL   GI: Soft, NTND. BS (+). Tolerating TF.     HOSPITAL MEDICATIONS:  MEDICATIONS  (STANDING):  ALBUTerol    0.083% 2.5 milliGRAM(s) Nebulizer every 6 hours  ALBUTerol    90 MICROgram(s) HFA Inhaler 1 Puff(s) Inhalation every 4 hours  chlorhexidine 4% Liquid 1 Application(s) Topical <User Schedule>  Clobazam Suspension 10 milliGRAM(s) Oral two times a day  enoxaparin Injectable 40 milliGRAM(s) SubCutaneous daily  lacosamide Solution 100 milliGRAM(s) Oral <User Schedule>  levETIRAcetam  IVPB 1000 milliGRAM(s) IV Intermittent <User Schedule>  OLANZapine 7.5 milliGRAM(s) Oral at bedtime  sodium chloride 3%  Inhalation 4 milliLiter(s) Inhalation every 6 hours  valproate sodium IVPB 750 milliGRAM(s) IV Intermittent every 8 hours    MEDICATIONS  (PRN):  sodium chloride 0.9% lock flush 10 milliLiter(s) IV Push every 1 hour PRN Pre/post blood products, medications, blood draw, and to maintain line patency      LABS:                        8.6    5.25  )-----------( 260      ( 11 Sep 2019 06:20 )             28.1     09-11    146<H>  |  103  |  29<H>  ----------------------------<  98  4.8   |  32<H>  |  1.09    Ca    9.2      11 Sep 2019 06:20  Phos  5.0     09-11  Mg     2.1     09-11    MICROBIOLOGY:     RADIOLOGY:  [ ] Reviewed and interpreted by me    PULMONARY FUNCTION TESTS:    EKG:

## 2019-09-11 NOTE — PROGRESS NOTE ADULT - PROBLEM SELECTOR PLAN 3
- chronic hypothermia- normothermic at present  - sputum cx with MRSA, but without leukocytosis, will monitor off abx for now  - blood cultures negative - Chronic hypothermia. Currently normothermic at present. Sputum culture with MRSA, but without leukocytosis. Blood cultures negative. ABX completed. Monitor OFF ABX.

## 2019-09-11 NOTE — PROGRESS NOTE ADULT - ASSESSMENT
54F with a PMH of cerebral palsy and seizure disorder presenting from [facility] home for acute AMS and increased work of breathing with recent seizure found to be hypothermic, hypotensive and bradycardic with leukocytosis and imaging showing new right lung opacification concerning for septic shock 2/2 to aspiration PNA requiring pressors and airway support, intermittently requiring pressors, seizures controlled with 4 AEDs.   Now extubated, off pressors, on midodrine. Ms. Ponce is a 55 YO F with PMHx of CP and Seizure Disorder presented form  for AHRF and AMS with breakthrough seizure the day prior to arrival. Septic Shock noted. The patient inubated for apsiration PNA and admitted to MICU on pressors. Extubated on 9/3 and completed ABX inpatient. VEEG with potential epileptogenic focus in the R frontotemporal region. No further seizure activities. Depakote and Keppra continued. Pressors weaned off, Midodrine dc'ed. BP stable. Patient downgraded to RCU. Patient failed SS and now pending PEG

## 2019-09-11 NOTE — PROGRESS NOTE ADULT - PROBLEM SELECTOR PLAN 1
- 2/2 likely aspiration pneumonia  - cultures negative  - completed course of zosyn   - extubated since 9/3, tolerating nasal cannula  - still requiring chest PT, suction PRN - AHRF 2/2 likely aspiration pneumonia. Extubated on 9/3. Pressors weaned to Midodrine (currently OFF and BP stable). ABX completed inpatient.

## 2019-09-11 NOTE — PROGRESS NOTE ADULT - PROBLEM SELECTOR PLAN 5
- with NGT currently, tolerating  - failed S&S today even with group home at bedside for assistance  - mom deciding on PEG tube - Failed SS with NGT currently, tolerating feeds. Mom deciding on PEG.

## 2019-09-11 NOTE — PROGRESS NOTE ADULT - PROBLEM SELECTOR PLAN 4
- pt is alert, nonverbal, tracks  - does not follow commands  - at baseline as per group home  - supportive care - Patient is alert, nonverbal, tracks, does not follow commands. This appears to be patient's baseline as per group home. Supportive care

## 2019-09-12 PROCEDURE — 99232 SBSQ HOSP IP/OBS MODERATE 35: CPT | Mod: GC

## 2019-09-12 RX ORDER — LACOSAMIDE 50 MG/1
100 TABLET ORAL
Refills: 0 | Status: DISCONTINUED | OUTPATIENT
Start: 2019-09-12 | End: 2019-09-14

## 2019-09-12 RX ORDER — OLANZAPINE 15 MG/1
7.5 TABLET, FILM COATED ORAL AT BEDTIME
Refills: 0 | Status: DISCONTINUED | OUTPATIENT
Start: 2019-09-12 | End: 2019-09-18

## 2019-09-12 RX ORDER — CLOBAZAM 10 MG/1
10 TABLET ORAL
Refills: 0 | Status: DISCONTINUED | OUTPATIENT
Start: 2019-09-12 | End: 2019-09-18

## 2019-09-12 RX ADMIN — ALBUTEROL 2.5 MILLIGRAM(S): 90 AEROSOL, METERED ORAL at 04:01

## 2019-09-12 RX ADMIN — LACOSAMIDE 100 MILLIGRAM(S): 50 TABLET ORAL at 22:50

## 2019-09-12 RX ADMIN — LEVETIRACETAM 400 MILLIGRAM(S): 250 TABLET, FILM COATED ORAL at 10:25

## 2019-09-12 RX ADMIN — LEVETIRACETAM 400 MILLIGRAM(S): 250 TABLET, FILM COATED ORAL at 15:05

## 2019-09-12 RX ADMIN — Medication 28.75 MILLIGRAM(S): at 01:05

## 2019-09-12 RX ADMIN — Medication 28.75 MILLIGRAM(S): at 18:01

## 2019-09-12 RX ADMIN — Medication 28.75 MILLIGRAM(S): at 11:00

## 2019-09-12 RX ADMIN — LEVETIRACETAM 400 MILLIGRAM(S): 250 TABLET, FILM COATED ORAL at 20:47

## 2019-09-12 RX ADMIN — OLANZAPINE 7.5 MILLIGRAM(S): 15 TABLET, FILM COATED ORAL at 22:51

## 2019-09-12 RX ADMIN — CHLORHEXIDINE GLUCONATE 1 APPLICATION(S): 213 SOLUTION TOPICAL at 06:48

## 2019-09-12 RX ADMIN — CLOBAZAM 10 MILLIGRAM(S): 10 TABLET ORAL at 18:01

## 2019-09-12 RX ADMIN — LACOSAMIDE 100 MILLIGRAM(S): 50 TABLET ORAL at 15:05

## 2019-09-12 RX ADMIN — CLOBAZAM 10 MILLIGRAM(S): 10 TABLET ORAL at 10:25

## 2019-09-12 RX ADMIN — SODIUM CHLORIDE 4 MILLILITER(S): 9 INJECTION INTRAMUSCULAR; INTRAVENOUS; SUBCUTANEOUS at 04:08

## 2019-09-12 RX ADMIN — ENOXAPARIN SODIUM 40 MILLIGRAM(S): 100 INJECTION SUBCUTANEOUS at 11:53

## 2019-09-12 RX ADMIN — LACOSAMIDE 100 MILLIGRAM(S): 50 TABLET ORAL at 06:48

## 2019-09-12 NOTE — PROGRESS NOTE ADULT - RS GEN PE MLT RESP DETAILS PC
no rales/no rhonchi/diminished breath sounds, L/diminished breath sounds, R
airway patent/breath sounds equal/good air movement/respirations non-labored/clear to auscultation bilaterally
airway patent/respirations non-labored/diminished breath sounds, L/diminished breath sounds, R
airway patent/good air movement/diminished breath sounds, L/diminished breath sounds, R
airway patent/rhonchi
respirations non-labored/no wheezes/diminished breath sounds, L/diminished breath sounds, R
airway patent/diminished breath sounds, L/diminished breath sounds, R

## 2019-09-12 NOTE — PROGRESS NOTE ADULT - ASSESSMENT
54F with a PMH of cerebral palsy and seizure disorder presenting from [facility] home for acute AMS and increased work of breathing with recent seizure found to be hypothermic, hypotensive and bradycardic with leukocytosis and imaging showing new right lung opacification concerning for septic shock 2/2 to aspiration PNA requiring pressors and airway support, intermittently requiring pressors, seizures controlled with 4 AEDs.   Now extubated, off pressors, on midodrine.

## 2019-09-12 NOTE — PROGRESS NOTE ADULT - SUBJECTIVE AND OBJECTIVE BOX
CHIEF COMPLAINT: Patient is a 54y old  Female who presents with a chief complaint of Septic Shock (11 Sep 2019 08:59)    Interval Events:      REVIEW OF SYSTEMS:  Constitutional:   Eyes:  ENT:  CV:  Resp:  GI:  :  MSK:  Integumentary:  Neurological:  Psychiatric:  Endocrine:  Hematologic/Lymphatic:  Allergic/Immunologic:  [ ] All other systems negative  [ ] Unable to assess ROS because ________      OBJECTIVE:  ICU Vital Signs Last 24 Hrs  T(C): 36.6 (12 Sep 2019 06:00), Max: 37.1 (11 Sep 2019 22:38)  T(F): 97.9 (12 Sep 2019 06:00), Max: 98.7 (11 Sep 2019 22:38)  HR: 84 (12 Sep 2019 06:00) (79 - 93)  BP: 104/89 (12 Sep 2019 06:00) (104/89 - 116/69)  BP(mean): --  ABP: --  ABP(mean): --  RR: 19 (12 Sep 2019 06:00) (17 - 19)  SpO2: 96% (12 Sep 2019 06:00) (96% - 100%)    09-11 @ 07:01  -  09-12 @ 07:00  --------------------------------------------------------  IN: 1150 mL / OUT: 0 mL / NET: 1150 mL    POCT Blood Glucose.: 105 mg/dL (11 Sep 2019 12:05)    HOSPITAL MEDICATIONS:  MEDICATIONS  (STANDING):  ALBUTerol    0.083% 2.5 milliGRAM(s) Nebulizer every 6 hours  ALBUTerol    90 MICROgram(s) HFA Inhaler 1 Puff(s) Inhalation every 4 hours  chlorhexidine 4% Liquid 1 Application(s) Topical <User Schedule>  Clobazam Suspension 10 milliGRAM(s) Oral two times a day  enoxaparin Injectable 40 milliGRAM(s) SubCutaneous daily  lacosamide Solution 100 milliGRAM(s) Oral <User Schedule>  levETIRAcetam  IVPB 1000 milliGRAM(s) IV Intermittent <User Schedule>  OLANZapine 7.5 milliGRAM(s) Oral at bedtime  sodium chloride 3%  Inhalation 4 milliLiter(s) Inhalation every 6 hours  valproate sodium IVPB 750 milliGRAM(s) IV Intermittent every 8 hours    MEDICATIONS  (PRN):  sodium chloride 0.9% lock flush 10 milliLiter(s) IV Push every 1 hour PRN Pre/post blood products, medications, blood draw, and to maintain line patency      LABS:                        8.6    5.25  )-----------( 260      ( 11 Sep 2019 06:20 )             28.1     09-11    146<H>  |  103  |  29<H>  ----------------------------<  98  4.8   |  32<H>  |  1.09    Ca    9.2      11 Sep 2019 06:20  Phos  5.0     09-11  Mg     2.1     09-11                MICROBIOLOGY:     RADIOLOGY:  [ ] Reviewed and interpreted by me    PULMONARY FUNCTION TESTS:    EKG: CHIEF COMPLAINT: Patient is a 54y old  Female who presents with a chief complaint of Septic Shock (11 Sep 2019 08:59)    Interval Events: none overnight       REVIEW OF SYSTEMS:  [ ] All other systems negative  [x] Unable to assess ROS because: nonverbal, does not follow commands       OBJECTIVE:  ICU Vital Signs Last 24 Hrs  T(C): 36.6 (12 Sep 2019 06:00), Max: 37.1 (11 Sep 2019 22:38)  T(F): 97.9 (12 Sep 2019 06:00), Max: 98.7 (11 Sep 2019 22:38)  HR: 84 (12 Sep 2019 06:00) (79 - 93)  BP: 104/89 (12 Sep 2019 06:00) (104/89 - 116/69)  BP(mean): --  ABP: --  ABP(mean): --  RR: 19 (12 Sep 2019 06:00) (17 - 19)  SpO2: 96% (12 Sep 2019 06:00) (96% - 100%)    09-11 @ 07:01  -  09-12 @ 07:00  --------------------------------------------------------  IN: 1150 mL / OUT: 0 mL / NET: 1150 mL    POCT Blood Glucose.: 105 mg/dL (11 Sep 2019 12:05)    HOSPITAL MEDICATIONS:  MEDICATIONS  (STANDING):  ALBUTerol    0.083% 2.5 milliGRAM(s) Nebulizer every 6 hours  ALBUTerol    90 MICROgram(s) HFA Inhaler 1 Puff(s) Inhalation every 4 hours  chlorhexidine 4% Liquid 1 Application(s) Topical <User Schedule>  Clobazam Suspension 10 milliGRAM(s) Oral two times a day  enoxaparin Injectable 40 milliGRAM(s) SubCutaneous daily  lacosamide Solution 100 milliGRAM(s) Oral <User Schedule>  levETIRAcetam  IVPB 1000 milliGRAM(s) IV Intermittent <User Schedule>  OLANZapine 7.5 milliGRAM(s) Oral at bedtime  sodium chloride 3%  Inhalation 4 milliLiter(s) Inhalation every 6 hours  valproate sodium IVPB 750 milliGRAM(s) IV Intermittent every 8 hours    MEDICATIONS  (PRN):  sodium chloride 0.9% lock flush 10 milliLiter(s) IV Push every 1 hour PRN Pre/post blood products, medications, blood draw, and to maintain line patency

## 2019-09-12 NOTE — PROGRESS NOTE ADULT - NEUROLOGICAL DETAILS
disoriented/strength decreased

## 2019-09-12 NOTE — PROGRESS NOTE ADULT - CVS HE PE MLT D E PC
regular rate and rhythm/no rub/no murmur
regular rate and rhythm
regular rate and rhythm/no rub/no murmur
regular rate and rhythm
regular rate and rhythm/no rub/no murmur
regular rate and rhythm/no rub/no murmur

## 2019-09-12 NOTE — PROGRESS NOTE ADULT - MS EXT PE MLT D E PC
no clubbing/no cyanosis
no clubbing/no cyanosis/no pedal edema

## 2019-09-13 LAB — MISCELLANEOUS - CHEM: SIGNIFICANT CHANGE UP

## 2019-09-13 PROCEDURE — 99222 1ST HOSP IP/OBS MODERATE 55: CPT | Mod: GC

## 2019-09-13 PROCEDURE — 99233 SBSQ HOSP IP/OBS HIGH 50: CPT

## 2019-09-13 RX ADMIN — Medication 28.75 MILLIGRAM(S): at 09:23

## 2019-09-13 RX ADMIN — OLANZAPINE 7.5 MILLIGRAM(S): 15 TABLET, FILM COATED ORAL at 21:51

## 2019-09-13 RX ADMIN — Medication 28.75 MILLIGRAM(S): at 03:13

## 2019-09-13 RX ADMIN — LEVETIRACETAM 400 MILLIGRAM(S): 250 TABLET, FILM COATED ORAL at 13:16

## 2019-09-13 RX ADMIN — CLOBAZAM 10 MILLIGRAM(S): 10 TABLET ORAL at 05:45

## 2019-09-13 RX ADMIN — CLOBAZAM 10 MILLIGRAM(S): 10 TABLET ORAL at 18:57

## 2019-09-13 RX ADMIN — ENOXAPARIN SODIUM 40 MILLIGRAM(S): 100 INJECTION SUBCUTANEOUS at 13:15

## 2019-09-13 RX ADMIN — LACOSAMIDE 100 MILLIGRAM(S): 50 TABLET ORAL at 21:51

## 2019-09-13 RX ADMIN — LACOSAMIDE 100 MILLIGRAM(S): 50 TABLET ORAL at 13:15

## 2019-09-13 RX ADMIN — CHLORHEXIDINE GLUCONATE 1 APPLICATION(S): 213 SOLUTION TOPICAL at 05:14

## 2019-09-13 RX ADMIN — LEVETIRACETAM 400 MILLIGRAM(S): 250 TABLET, FILM COATED ORAL at 21:50

## 2019-09-13 RX ADMIN — Medication 28.75 MILLIGRAM(S): at 18:57

## 2019-09-13 RX ADMIN — LACOSAMIDE 100 MILLIGRAM(S): 50 TABLET ORAL at 05:14

## 2019-09-13 RX ADMIN — LEVETIRACETAM 400 MILLIGRAM(S): 250 TABLET, FILM COATED ORAL at 09:23

## 2019-09-13 NOTE — PROGRESS NOTE ADULT - PROBLEM SELECTOR PLAN 5
- with NGT currently, tolerating  - failed S&S today even with group home at bedside for assistance  - mom agreed to PEG tube; awaiting discussion with GI; GI consulted

## 2019-09-13 NOTE — PROGRESS NOTE ADULT - PROBLEM SELECTOR PLAN 2
- VEEG with potential epileptogenic focus in the right fronto-temporal region  - no further seizure activity noted  - cont AEDs as ordered

## 2019-09-13 NOTE — CONSULT NOTE ADULT - ASSESSMENT
54 year old female with medical history of cerebral palsy and seizure disorder presenting from group home admitted to MICU initially for septic shock 2/2 to aspiration PNA requiring pressors and airway support and breakthrough seizure.  Extubated since 9/3, she was seen by speech swallow and recommend NPO.  GI consult was called for PEG tube placement.    Impression:  # Oropharyngeal dysphagia secondary to cerebral palsy     Recommendations:  - Please have speech swallow sees her again for another eval.  - If she fails will place PEG Monday or Tuesday (depends on Endo schedule) and if she remains medically stable

## 2019-09-13 NOTE — PROGRESS NOTE ADULT - SUBJECTIVE AND OBJECTIVE BOX
Hospitalist: Kinza Medina MD (pager [phone])    CHIEF COMPLAINT: Patient is a 54y old  female who presents with a chief complaint of Septic Shock (12 Sep 2019 08:06)    SUBJECTIVE / OVERNIGHT EVENTS: Patient seen and examined. No acute events overnight. Patient nonverbal, does not follow commands.    MEDICATIONS  (STANDING):  ALBUTerol    90 MICROgram(s) HFA Inhaler 1 Puff(s) Inhalation every 4 hours  chlorhexidine 4% Liquid 1 Application(s) Topical <User Schedule>  Clobazam Suspension 10 milliGRAM(s) Oral two times a day  enoxaparin Injectable 40 milliGRAM(s) SubCutaneous daily  lacosamide Solution 100 milliGRAM(s) Oral <User Schedule>  levETIRAcetam  IVPB 1000 milliGRAM(s) IV Intermittent <User Schedule>  OLANZapine 7.5 milliGRAM(s) Oral at bedtime  valproate sodium IVPB 750 milliGRAM(s) IV Intermittent every 8 hours    MEDICATIONS  (PRN):  sodium chloride 0.9% lock flush 10 milliLiter(s) IV Push every 1 hour PRN Pre/post blood products, medications, blood draw, and to maintain line patency    VITALS:  T(F): 98.3 (09-13-19 @ 15:25), Max: 98.3 (09-13-19 @ 15:25)  HR: 106 (09-13-19 @ 15:25) (82 - 106)  BP: 138/85 (09-13-19 @ 15:25) (116/82 - 138/85)  RR: 18 (09-13-19 @ 15:25) (18 - 18)  SpO2: 97% (09-13-19 @ 15:25)    PHYSICAL EXAM:  GENERAL: NAD, well-developed  HEAD:  Atraumatic, Normocephalic, NGT in place  CHEST/LUNG: diminished breath sounds at the bases  HEART: Regular rate and rhythm; No murmurs, rubs, or gallops  ABDOMEN: Soft, Nontender, Nondistended; Bowel sounds present  EXTREMITIES:  2+ Peripheral Pulses, No clubbing, cyanosis, or edema    LABS: none    [ ] Consultant(s) Notes Reviewed:  [x] Care Discussed with Consultants/Other Providers: ADS NP - discussed consulting GI

## 2019-09-13 NOTE — CONSULT NOTE ADULT - ATTENDING COMMENTS
GI consulted for PEG placement. Patient previously eating at home (though limited diet). Patient was evaluated by S&S on 9/9; recommended NPO status and evaluation for alternate means of nutrition. Family at bedside amenable to PEG placement, but interested in if her dysphagia has improved as more time has passed post-extubation. Can have S&S re-eval early next week, but if patient fails again, will pursue PEG placement.
I personally interviewed, examined, and participated in the care of this patient, on rounds 8/19/2019 with the neurology consult service team.  Reviewed findings and management plan with the team.  In addition to or instead of the Hx and findings and plan reported above, or in particular, I note as follow:    On exam now (on sedation):    All observed respirations triggered.  Pupils approx 3mm, irregular, reactive.  No response to noise or voice.  No withdrawal to brief moderately irritating stimulation.    I agree w the recommendations above.
Patient seen and examined.  Agree with fellow note.

## 2019-09-13 NOTE — PROGRESS NOTE ADULT - ASSESSMENT
54F h/o cerebral palsy and seizure disorder presenting from [facility] home for acute AMS and increased work of breathing with recent seizure found to be hypothermic, hypotensive and bradycardic with leukocytosis and imaging showing new right lung opacification concerning for septic shock 2/2 to aspiration PNA requiring pressors and airway support, intermittently requiring pressors, seizures controlled with 4 AEDs. Now extubated, off pressors, on midodrine.

## 2019-09-13 NOTE — CONSULT NOTE ADULT - SUBJECTIVE AND OBJECTIVE BOX
Chief Complaint:  seizure 	    HPI:  54F with a PMH of cerebral palsy and seizure disorder presenting from group home for acute AMS and increased work of breathing with recent seizure. She was found to be hypothermic, hypotensive and bradycardic with leukocytosis and imaging showing new right lung opacification. She was admitted to MICU for septic shock 2/ to aspiration PNA requiring pressors and airway support, intermittently requiring pressors, seizures controlled with 4 AEDs.   extubated since 9/3, she was seen by speech swallow and recommend NPO.  GI consult was called for PEG tube placement.  Patient is unable to provide history due to her baseline cerebral palsy.    Allergies:  Topamax (Unknown)      Home Medications:    Hospital Medications:  ALBUTerol    90 MICROgram(s) HFA Inhaler 1 Puff(s) Inhalation every 4 hours  chlorhexidine 4% Liquid 1 Application(s) Topical <User Schedule>  Clobazam Suspension 10 milliGRAM(s) Oral two times a day  enoxaparin Injectable 40 milliGRAM(s) SubCutaneous daily  lacosamide Solution 100 milliGRAM(s) Oral <User Schedule>  levETIRAcetam  IVPB 1000 milliGRAM(s) IV Intermittent <User Schedule>  OLANZapine 7.5 milliGRAM(s) Oral at bedtime  sodium chloride 0.9% lock flush 10 milliLiter(s) IV Push every 1 hour PRN  valproate sodium IVPB 750 milliGRAM(s) IV Intermittent every 8 hours      PMHX/PSHX:  Intellectual disability  Constipation  Seizure disorder  Cerebral palsy  No significant past surgical history      Family history:  unable to be obtained due to her baseline cerebral palsy       Social History: no smoking    ROS: unable to be obtained due to her baseline cerebral palsy       PHYSICAL EXAM:   GENERAL:  NAD, Appears stated age  HEENT:  NC/AT,  conjunctivae clear and pink, sclera -anicteric  CHEST:  no wheezes or rales  HEART:  RRR S1/S2,   ABDOMEN:  Soft, non-tender, non-distended, normoactive bowel sounds,  no masses   EXTREMITIES:  contractures   SKIN:  Warm & Dry. No rash or erythema  NEURO:  unable to asses due to her baseline cerebral palsy     Vital Signs:  Vital Signs Last 24 Hrs  T(C): 36.8 (13 Sep 2019 15:25), Max: 36.8 (13 Sep 2019 15:25)  T(F): 98.3 (13 Sep 2019 15:25), Max: 98.3 (13 Sep 2019 15:25)  HR: 106 (13 Sep 2019 15:25) (82 - 106)  BP: 138/85 (13 Sep 2019 15:25) (116/82 - 138/85)  BP(mean): --  RR: 18 (13 Sep 2019 15:25) (18 - 18)  SpO2: 97% (13 Sep 2019 15:25) (97% - 100%)  Daily     Daily Weight in k.4 (13 Sep 2019 06:00)    LABS:                                          8.6    5.25  )-----------( 260      ( 11 Sep 2019 06:20 )             28.1     Imaging:

## 2019-09-14 PROCEDURE — 99233 SBSQ HOSP IP/OBS HIGH 50: CPT

## 2019-09-14 RX ORDER — OLANZAPINE 15 MG/1
2.5 TABLET, FILM COATED ORAL AT BEDTIME
Refills: 0 | Status: DISCONTINUED | OUTPATIENT
Start: 2019-09-14 | End: 2019-09-18

## 2019-09-14 RX ORDER — LACOSAMIDE 50 MG/1
100 TABLET ORAL
Refills: 0 | Status: DISCONTINUED | OUTPATIENT
Start: 2019-09-14 | End: 2019-09-18

## 2019-09-14 RX ORDER — SODIUM CHLORIDE 9 MG/ML
1000 INJECTION, SOLUTION INTRAVENOUS
Refills: 0 | Status: DISCONTINUED | OUTPATIENT
Start: 2019-09-14 | End: 2019-09-19

## 2019-09-14 RX ADMIN — CLOBAZAM 10 MILLIGRAM(S): 10 TABLET ORAL at 06:09

## 2019-09-14 RX ADMIN — CHLORHEXIDINE GLUCONATE 1 APPLICATION(S): 213 SOLUTION TOPICAL at 06:09

## 2019-09-14 RX ADMIN — LEVETIRACETAM 400 MILLIGRAM(S): 250 TABLET, FILM COATED ORAL at 13:54

## 2019-09-14 RX ADMIN — LEVETIRACETAM 400 MILLIGRAM(S): 250 TABLET, FILM COATED ORAL at 07:22

## 2019-09-14 RX ADMIN — Medication 28.75 MILLIGRAM(S): at 17:53

## 2019-09-14 RX ADMIN — LACOSAMIDE 120 MILLIGRAM(S): 50 TABLET ORAL at 22:58

## 2019-09-14 RX ADMIN — LEVETIRACETAM 400 MILLIGRAM(S): 250 TABLET, FILM COATED ORAL at 20:35

## 2019-09-14 RX ADMIN — LACOSAMIDE 100 MILLIGRAM(S): 50 TABLET ORAL at 06:09

## 2019-09-14 RX ADMIN — Medication 28.75 MILLIGRAM(S): at 09:38

## 2019-09-14 RX ADMIN — ENOXAPARIN SODIUM 40 MILLIGRAM(S): 100 INJECTION SUBCUTANEOUS at 13:57

## 2019-09-14 RX ADMIN — SODIUM CHLORIDE 75 MILLILITER(S): 9 INJECTION, SOLUTION INTRAVENOUS at 20:35

## 2019-09-14 RX ADMIN — Medication 28.75 MILLIGRAM(S): at 01:45

## 2019-09-14 RX ADMIN — OLANZAPINE 2.5 MILLIGRAM(S): 15 TABLET, FILM COATED ORAL at 22:59

## 2019-09-14 NOTE — PROGRESS NOTE ADULT - PROBLEM SELECTOR PLAN 5
- with NGT currently, tolerating  - failed S&S today even with group home at bedside for assistance  - mom agreed to PEG tube  - GI consult reviewed: recommending repeat speech swallow eval. If she fails they will place PEG Monday or Tuesday (depends on Endo schedule) and if she remains medically stable.

## 2019-09-14 NOTE — PROGRESS NOTE ADULT - SUBJECTIVE AND OBJECTIVE BOX
Hospitalist: Kinza Medina MD (pager [phone])    CHIEF COMPLAINT: Patient is a 54y old  female who presents with a chief complaint of Septic Shock (13 Sep 2019 16:10)    SUBJECTIVE / OVERNIGHT EVENTS: Patient seen and examined. No acute events overnight.    MEDICATIONS  (STANDING):  ALBUTerol    90 MICROgram(s) HFA Inhaler 1 Puff(s) Inhalation every 4 hours  chlorhexidine 4% Liquid 1 Application(s) Topical <User Schedule>  Clobazam Suspension 10 milliGRAM(s) Oral two times a day  enoxaparin Injectable 40 milliGRAM(s) SubCutaneous daily  lacosamide Solution 100 milliGRAM(s) Oral <User Schedule>  levETIRAcetam  IVPB 1000 milliGRAM(s) IV Intermittent <User Schedule>  OLANZapine 7.5 milliGRAM(s) Oral at bedtime  valproate sodium IVPB 750 milliGRAM(s) IV Intermittent every 8 hours    MEDICATIONS  (PRN):  sodium chloride 0.9% lock flush 10 milliLiter(s) IV Push every 1 hour PRN Pre/post blood products, medications, blood draw, and to maintain line patency      VITALS:  T(F): 98 (09-14-19 @ 06:00), Max: 98.3 (09-13-19 @ 15:25)  HR: 91 (09-14-19 @ 06:00) (91 - 106)  BP: 146/89 (09-14-19 @ 06:00) (127/74 - 146/89)  RR: 18 (09-14-19 @ 06:00) (18 - 18)  SpO2: 98% (09-14-19 @ 06:00)      PHYSICAL EXAM:  GENERAL: NAD, well-developed  HEAD:  Atraumatic, Normocephalic, NGT in place  CHEST/LUNG: diminished breath sounds at the bases  HEART: Regular rate and rhythm; No murmurs, rubs, or gallops  ABDOMEN: Soft, Nontender, Nondistended; Bowel sounds present  EXTREMITIES:  contractures  NEURO:  unable to asses due to her baseline cerebral palsy     LABS: none    [x] Consultant(s) Notes Reviewed: GI  [x] Care Discussed with Consultants/Other Providers: ADS NP - discussed management of dysphagia

## 2019-09-14 NOTE — PROGRESS NOTE ADULT - ASSESSMENT
54F h/o cerebral palsy and seizure disorder presenting from [facility] home for acute AMS and increased work of breathing with recent seizure found to be hypothermic, hypotensive and bradycardic with leukocytosis and imaging showing new right lung opacification concerning for septic shock 2/2 to aspiration PNA requiring pressors and airway support, intermittently requiring pressors, seizures controlled with 4 AEDs. Now extubated, off pressors, on midodrine with oropharyngeal dysphagia secondary to cerebral palsy.

## 2019-09-14 NOTE — CHART NOTE - NSCHARTNOTEFT_GEN_A_CORE
pt pulled out NGT earlier today. Attempted to replace NGT x 2 however pt pulled it out both times and was not tolerating NGT well. Pt with coughing and agitation. Dr. Medina aware of situation. Zyprexa and Vimpat changed to IV and will start IV fluids. S&S to be done monday to determine if pt will need PEG tube or if she can tolerate PO. GI team on board and will do PEG once S&S done if indicated. Will try to precipitate S&S to be done early morning

## 2019-09-15 LAB
ANION GAP SERPL CALC-SCNC: 13 MMO/L — SIGNIFICANT CHANGE UP (ref 7–14)
BUN SERPL-MCNC: 36 MG/DL — HIGH (ref 7–23)
CALCIUM SERPL-MCNC: 9.5 MG/DL — SIGNIFICANT CHANGE UP (ref 8.4–10.5)
CHLORIDE SERPL-SCNC: 103 MMOL/L — SIGNIFICANT CHANGE UP (ref 98–107)
CO2 SERPL-SCNC: 30 MMOL/L — SIGNIFICANT CHANGE UP (ref 22–31)
CREAT SERPL-MCNC: 1.12 MG/DL — SIGNIFICANT CHANGE UP (ref 0.5–1.3)
GLUCOSE SERPL-MCNC: 93 MG/DL — SIGNIFICANT CHANGE UP (ref 70–99)
HCT VFR BLD CALC: 34 % — LOW (ref 34.5–45)
HGB BLD-MCNC: 10.3 G/DL — LOW (ref 11.5–15.5)
MAGNESIUM SERPL-MCNC: 2 MG/DL — SIGNIFICANT CHANGE UP (ref 1.6–2.6)
MCHC RBC-ENTMCNC: 30.3 % — LOW (ref 32–36)
MCHC RBC-ENTMCNC: 32.8 PG — SIGNIFICANT CHANGE UP (ref 27–34)
MCV RBC AUTO: 108.3 FL — HIGH (ref 80–100)
NRBC # FLD: 0 K/UL — SIGNIFICANT CHANGE UP (ref 0–0)
PHOSPHATE SERPL-MCNC: 4.1 MG/DL — SIGNIFICANT CHANGE UP (ref 2.5–4.5)
PLATELET # BLD AUTO: 242 K/UL — SIGNIFICANT CHANGE UP (ref 150–400)
PMV BLD: 10.1 FL — SIGNIFICANT CHANGE UP (ref 7–13)
POTASSIUM SERPL-MCNC: 4.6 MMOL/L — SIGNIFICANT CHANGE UP (ref 3.5–5.3)
POTASSIUM SERPL-SCNC: 4.6 MMOL/L — SIGNIFICANT CHANGE UP (ref 3.5–5.3)
RBC # BLD: 3.14 M/UL — LOW (ref 3.8–5.2)
RBC # FLD: 14.1 % — SIGNIFICANT CHANGE UP (ref 10.3–14.5)
SODIUM SERPL-SCNC: 146 MMOL/L — HIGH (ref 135–145)
WBC # BLD: 6.79 K/UL — SIGNIFICANT CHANGE UP (ref 3.8–10.5)
WBC # FLD AUTO: 6.79 K/UL — SIGNIFICANT CHANGE UP (ref 3.8–10.5)

## 2019-09-15 PROCEDURE — 99233 SBSQ HOSP IP/OBS HIGH 50: CPT

## 2019-09-15 RX ADMIN — OLANZAPINE 2.5 MILLIGRAM(S): 15 TABLET, FILM COATED ORAL at 21:36

## 2019-09-15 RX ADMIN — CHLORHEXIDINE GLUCONATE 1 APPLICATION(S): 213 SOLUTION TOPICAL at 05:02

## 2019-09-15 RX ADMIN — LACOSAMIDE 120 MILLIGRAM(S): 50 TABLET ORAL at 21:36

## 2019-09-15 RX ADMIN — LEVETIRACETAM 400 MILLIGRAM(S): 250 TABLET, FILM COATED ORAL at 15:35

## 2019-09-15 RX ADMIN — LEVETIRACETAM 400 MILLIGRAM(S): 250 TABLET, FILM COATED ORAL at 19:59

## 2019-09-15 RX ADMIN — LEVETIRACETAM 400 MILLIGRAM(S): 250 TABLET, FILM COATED ORAL at 09:07

## 2019-09-15 RX ADMIN — Medication 28.75 MILLIGRAM(S): at 17:49

## 2019-09-15 RX ADMIN — SODIUM CHLORIDE 75 MILLILITER(S): 9 INJECTION, SOLUTION INTRAVENOUS at 09:07

## 2019-09-15 RX ADMIN — Medication 28.75 MILLIGRAM(S): at 09:36

## 2019-09-15 RX ADMIN — SODIUM CHLORIDE 100 MILLILITER(S): 9 INJECTION, SOLUTION INTRAVENOUS at 15:36

## 2019-09-15 RX ADMIN — LACOSAMIDE 120 MILLIGRAM(S): 50 TABLET ORAL at 05:01

## 2019-09-15 RX ADMIN — ENOXAPARIN SODIUM 40 MILLIGRAM(S): 100 INJECTION SUBCUTANEOUS at 11:58

## 2019-09-15 RX ADMIN — Medication 28.75 MILLIGRAM(S): at 01:56

## 2019-09-15 RX ADMIN — LACOSAMIDE 120 MILLIGRAM(S): 50 TABLET ORAL at 15:35

## 2019-09-15 NOTE — SWALLOW BEDSIDE ASSESSMENT ADULT - SWALLOW EVAL: DIAGNOSIS
Pt presents with 1. Mild to moderate oral dysphagia when given puree marked by reduced utensil stripping, anterior spillage (more prominent on left), prolonged bolus manipulation with delayed anterior to posterior transfer, and mild/diffuse oral residue post swallow (retrieved with oral suctioning via yankauer, as pt was unable to complete additional cued swallow or lingual sweep). 2. Severe pharyngeal dysphagia when given puree marked by suspected delayed swallow onset, +laryngeal elevation to palpation, increased wet upper airway breath sounds, and immediate cough response with expectoration of bolus (retrieved with oral suctioning via yankauer). 3. Oral nutrition/hydration/medication is contraindicated at this time, continue alternate means. Recommend GOC discussion re: nutritional intake. Discussed results with aide and team.
1. Moderate-Severe oral phase dysphagia for puree consistency and honey thick liquids marked by reduced labial seal with labial spillage, reduced bolus manipulation and reduced anterior-posterior transport with suspected posterior loss of bolus. Reduced oral clearance noted marked by residue in lateral and anterior sulci subsequent swallow which required manual removal via Yankhauer suction 2. Severe pharyngeal phase dysphagia for puree consistency and honey thick liquids marked by reduced and delayed laryngeal elevation upon palpation. Immediate evidence of wet vocal quality and throat clearing noted subsequent deglutition indicative of laryngeal penetration/aspiration.
Pt p/w severe oral prep dysphagia when attempting trials of puree and honey thick liquids marked by reduced PO acceptance with head turn away from bolus presentation and tight labial seal. Pt did not make attempts to retrieve bolus from spoon/cup presentation when presented to labial surface. Pt observed to become increasingly more irritated as attempts progressed. Given pt did not orally accept bolus, unable to assess oral/pharyngeal phases of swallow; therefore, unable to safely recommend PO diet at this time. Recommend pt resume NGT for alternate means of nutrition/hydration/medication. Pt would benefit from GOC discussion re: nutritional intake. Discussed results with team.

## 2019-09-15 NOTE — PROGRESS NOTE ADULT - PROBLEM SELECTOR PLAN 2
-patient creatinine function rising from 0.4 on 9/4 to 1.12 today 9/15  -likely pre-renal given decreased PO intake  -will increase fluids to 100cc/hr -- patient with no history of cardiac disease or heart failure  -monitor creatinine closely

## 2019-09-15 NOTE — PROGRESS NOTE ADULT - ASSESSMENT
54F h/o cerebral palsy and seizure disorder from group home a/w acute AMS and increased work of breathing with recent seizure a/w septic shock 2/2 to aspiration PNA s/p MICU stay requiring pressors and airway support, intermittently requiring pressors, seizures controlled with 4 AEDs. Now extubated, off pressors, on midodrine with oropharyngeal dysphagia secondary to cerebral palsy awaiting GI eval for possible PEG tube placement.

## 2019-09-15 NOTE — SWALLOW BEDSIDE ASSESSMENT ADULT - MODE OF PRESENTATION
fed by aide/spoon
2x 1/4tspn of puree; 1x 1/4tspn honey thick liquids/spoon/fed by clinician
cup/spoon/fed by clinician

## 2019-09-15 NOTE — PROGRESS NOTE ADULT - PROBLEM SELECTOR PLAN 1
- pulled her NGT on 9/14/19 -- attempted to replace but patient not tolerating NGT and kept pulling them out  - failed S&S today even with group home at bedside for assistance  - mom agreed to PEG tube  - GI consult reviewed: recommending repeat speech swallow eval. If she fails they will place PEG Monday or Tuesday (depends on Endo schedule) and if she remains medically stable.  -patient currently NPO on D5 1/2 NS  -will hold lovenox in am for possible anticipated procedure (PEG)

## 2019-09-15 NOTE — SWALLOW BEDSIDE ASSESSMENT ADULT - COMMENTS
Patient is a "53 YO F with PMHx of CP and Seizure Disorder presented form  for AHRF and AMS with breakthrough seizure the day prior to arrival. Septic Shock noted. The patient intubated for aspiration PNA and admitted to MICU on pressors. Extubated on 9/3 and completed ABX inpatient. VEEG with potential epileptogenic focus in the R frontotemporal region". As per Attending Note on 9/15/19, "patient pulled out NGT on 9/14/19; GI consulted and recommended repeat speech and swallow, if fails, will place PEG".     Patient well known to this service from this admission and previous admissions. Most recent Clinical Bedside Swallow Evaluation completed recommended NPO. Cinesophagram completed in 06/2018 and 10/2018. Cinesophagram completed in 10/2018 recommended Puree consistency and Honey Thick Liquids.     Patient seen upright at bedside with nasal canula in place. Patient alert/awake with no verbalizations and minimal vocalizations. Patient unable to follow simple directions, though was orally receptive to PO trials. Patient with baseline poor secretion management prior to PO trials. Patient noted with excess saliva and lateral spillage which required suctioning via Yankhauer suction prior to PO trials.

## 2019-09-15 NOTE — SWALLOW BEDSIDE ASSESSMENT ADULT - ORAL PREPARATORY PHASE
Refuses to accept bolus into oral cavity
Reduced oral grading/Lateral loss of bolus/Bolus falls into left lateral sulci
Reduced oral grading

## 2019-09-15 NOTE — PROGRESS NOTE ADULT - SUBJECTIVE AND OBJECTIVE BOX
Hospitalist: Kinza Medina MD (pager [phone])    CHIEF COMPLAINT: Patient is a 54y old  female who presents with a chief complaint of Septic Shock (14 Sep 2019 10:50)    SUBJECTIVE / OVERNIGHT EVENTS: Patient seen and examined. Patient pulled her NG tube out yesterday. 2 attempts were made to place the NGT and were successful, but patient pulled it out twice. Zyprexa and Vimpat were changed to IV and patient started on D5 1/2 NS.     MEDICATIONS  (STANDING):  ALBUTerol    90 MICROgram(s) HFA Inhaler 1 Puff(s) Inhalation every 4 hours  chlorhexidine 4% Liquid 1 Application(s) Topical <User Schedule>  Clobazam Suspension 10 milliGRAM(s) Oral two times a day  dextrose 5% + sodium chloride 0.45%. 1000 milliLiter(s) (75 mL/Hr) IV Continuous <Continuous>  enoxaparin Injectable 40 milliGRAM(s) SubCutaneous daily  lacosamide IVPB 100 milliGRAM(s) IV Intermittent <User Schedule>  levETIRAcetam  IVPB 1000 milliGRAM(s) IV Intermittent <User Schedule>  OLANZapine 7.5 milliGRAM(s) Oral at bedtime  OLANZapine Injectable 2.5 milliGRAM(s) IntraMuscular at bedtime  valproate sodium IVPB 750 milliGRAM(s) IV Intermittent every 8 hours    MEDICATIONS  (PRN):  sodium chloride 0.9% lock flush 10 milliLiter(s) IV Push every 1 hour PRN Pre/post blood products, medications, blood draw, and to maintain line patency    VITALS:  T(F): 98 (09-15-19 @ 05:05), Max: 98.7 (09-14-19 @ 15:08)  HR: 87 (09-15-19 @ 05:05) (87 - 94)  BP: 124/89 (09-15-19 @ 05:05) (116/70 - 136/84)  RR: 18 (09-15-19 @ 05:05) (18 - 18)  SpO2: 97% (09-15-19 @ 05:05)    PHYSICAL EXAM:  GENERAL: NAD, well-developed  HEAD:  Atraumatic, Normocephalic  CHEST/LUNG: diminished breath sounds at the bases  HEART: Regular rate and rhythm; No murmurs, rubs, or gallops  ABDOMEN: Soft, Nontender, Nondistended; Bowel sounds present  EXTREMITIES:  contractures  NEURO:  unable to asses due to her baseline cerebral palsy     LABS:              10.3                 146  | 30   | 36           6.79  >-----------< 242     ------------------------< 93                    34.0                 4.6  | 103  | 1.12                                         Ca 9.5   Mg 2.0   Ph 4.1      [ ] Consultant(s) Notes Reviewed:  [x] Care Discussed with Consultants/Other Providers: ADS NP - discussed management of IVF

## 2019-09-15 NOTE — SWALLOW BEDSIDE ASSESSMENT ADULT - ORAL PHASE
Decreased anterior-posterior movement of the bolus/Delayed oral transit time/Stasis in anterior sulcus/Stasis in lateral sulci
KEEGAN
Decreased anterior-posterior movement of the bolus/Delayed oral transit time

## 2019-09-15 NOTE — SWALLOW BEDSIDE ASSESSMENT ADULT - SWALLOW EVAL: SECRETION MANAGEMENT
left corner drooling/pooling/problems swallowing secretions/wet upper airway/breath sounds
wet upper airway/breath sounds

## 2019-09-16 DIAGNOSIS — E86.0 DEHYDRATION: ICD-10-CM

## 2019-09-16 PROCEDURE — 99233 SBSQ HOSP IP/OBS HIGH 50: CPT

## 2019-09-16 RX ADMIN — LEVETIRACETAM 400 MILLIGRAM(S): 250 TABLET, FILM COATED ORAL at 13:22

## 2019-09-16 RX ADMIN — Medication 28.75 MILLIGRAM(S): at 19:49

## 2019-09-16 RX ADMIN — CHLORHEXIDINE GLUCONATE 1 APPLICATION(S): 213 SOLUTION TOPICAL at 05:27

## 2019-09-16 RX ADMIN — ENOXAPARIN SODIUM 40 MILLIGRAM(S): 100 INJECTION SUBCUTANEOUS at 11:04

## 2019-09-16 RX ADMIN — Medication 28.75 MILLIGRAM(S): at 02:21

## 2019-09-16 RX ADMIN — Medication 28.75 MILLIGRAM(S): at 11:04

## 2019-09-16 RX ADMIN — OLANZAPINE 2.5 MILLIGRAM(S): 15 TABLET, FILM COATED ORAL at 21:23

## 2019-09-16 RX ADMIN — LEVETIRACETAM 400 MILLIGRAM(S): 250 TABLET, FILM COATED ORAL at 21:23

## 2019-09-16 RX ADMIN — LACOSAMIDE 120 MILLIGRAM(S): 50 TABLET ORAL at 05:26

## 2019-09-16 RX ADMIN — LACOSAMIDE 120 MILLIGRAM(S): 50 TABLET ORAL at 13:51

## 2019-09-16 RX ADMIN — LEVETIRACETAM 400 MILLIGRAM(S): 250 TABLET, FILM COATED ORAL at 08:20

## 2019-09-16 RX ADMIN — LACOSAMIDE 120 MILLIGRAM(S): 50 TABLET ORAL at 22:12

## 2019-09-16 NOTE — PROGRESS NOTE ADULT - PROBLEM SELECTOR PLAN 1
- pulled her NGT on 9/14/19 -- attempted to replace but patient not tolerating NGT and kept pulling them out  - failed S&S today even with group home at bedside for assistance  - mom agreed to PEG tube  - repeat S/S-failed recommend non oral means of nutrition-Medically stable for PEG placement   -patient currently NPO on D5 1/2 NS  -will hold lovenox in am for possible anticipated procedure (PEG)

## 2019-09-16 NOTE — PROGRESS NOTE ADULT - SUBJECTIVE AND OBJECTIVE BOX
Patient is a 54y old  Female who presents with a chief complaint of Septic Shock (15 Sep 2019 09:22)      SUBJECTIVE / OVERNIGHT EVENTS: Pt in NAD. review of systems unable to obtain due to MR    MEDICATIONS  (STANDING):  ALBUTerol    90 MICROgram(s) HFA Inhaler 1 Puff(s) Inhalation every 4 hours  chlorhexidine 4% Liquid 1 Application(s) Topical <User Schedule>  Clobazam Suspension 10 milliGRAM(s) Oral two times a day  dextrose 5% + sodium chloride 0.45%. 1000 milliLiter(s) (100 mL/Hr) IV Continuous <Continuous>  enoxaparin Injectable 40 milliGRAM(s) SubCutaneous daily  lacosamide IVPB 100 milliGRAM(s) IV Intermittent <User Schedule>  levETIRAcetam  IVPB 1000 milliGRAM(s) IV Intermittent <User Schedule>  OLANZapine 7.5 milliGRAM(s) Oral at bedtime  OLANZapine Injectable 2.5 milliGRAM(s) IntraMuscular at bedtime  valproate sodium IVPB 750 milliGRAM(s) IV Intermittent every 8 hours    MEDICATIONS  (PRN):  sodium chloride 0.9% lock flush 10 milliLiter(s) IV Push every 1 hour PRN Pre/post blood products, medications, blood draw, and to maintain line patency        CAPILLARY BLOOD GLUCOSE        I&O's Summary      T(C): 36.6 (09-16-19 @ 12:26), Max: 36.6 (09-15-19 @ 21:16)  HR: 76 (09-16-19 @ 12:26) (74 - 79)  BP: 136/71 (09-16-19 @ 12:26) (105/64 - 144/70)  RR: 19 (09-16-19 @ 12:26) (19 - 20)  SpO2: 97% (09-16-19 @ 12:26) (97% - 100%)    PHYSICAL EXAM:  GENERAL: NAD, well-developed  HEAD:  Atraumatic, Normocephalic  CHEST/LUNG: diminished breath sounds at the bases  HEART: Regular rate and rhythm; No murmurs, rubs, or gallops  ABDOMEN: Soft, Nontender, Nondistended; Bowel sounds present  EXTREMITIES:  contractures    LABS:                        10.3   6.79  )-----------( 242      ( 15 Sep 2019 06:51 )             34.0     09-15    146<H>  |  103  |  36<H>  ----------------------------<  93  4.6   |  30  |  1.12    Ca    9.5      15 Sep 2019 06:51  Phos  4.1     09-15  Mg     2.0     09-15                  RADIOLOGY & ADDITIONAL TESTS:    Imaging Personally Reviewed:    Consultant(s) Notes Reviewed:      Care Discussed with Consultants/Other Providers:

## 2019-09-16 NOTE — PROGRESS NOTE ADULT - PROBLEM SELECTOR PLAN 6
- pt is alert, nonverbal, tracks  - does not follow commands  - at baseline as per group home  - supportive care - pt is alert, nonverbal, tracks  - does not follow commands  - at baseline as per group home  - functional quadriplegia  - supportive care

## 2019-09-17 LAB
ANION GAP SERPL CALC-SCNC: 12 MMO/L — SIGNIFICANT CHANGE UP (ref 7–14)
BUN SERPL-MCNC: 27 MG/DL — HIGH (ref 7–23)
CALCIUM SERPL-MCNC: 9.6 MG/DL — SIGNIFICANT CHANGE UP (ref 8.4–10.5)
CHLORIDE SERPL-SCNC: 103 MMOL/L — SIGNIFICANT CHANGE UP (ref 98–107)
CO2 SERPL-SCNC: 32 MMOL/L — HIGH (ref 22–31)
CREAT SERPL-MCNC: 0.97 MG/DL — SIGNIFICANT CHANGE UP (ref 0.5–1.3)
GLUCOSE SERPL-MCNC: 68 MG/DL — LOW (ref 70–99)
HCG SERPL-ACNC: < 5 MIU/ML — SIGNIFICANT CHANGE UP
HCT VFR BLD CALC: 32.4 % — LOW (ref 34.5–45)
HGB BLD-MCNC: 10.2 G/DL — LOW (ref 11.5–15.5)
MAGNESIUM SERPL-MCNC: 1.7 MG/DL — SIGNIFICANT CHANGE UP (ref 1.6–2.6)
MCHC RBC-ENTMCNC: 31.5 % — LOW (ref 32–36)
MCHC RBC-ENTMCNC: 32.5 PG — SIGNIFICANT CHANGE UP (ref 27–34)
MCV RBC AUTO: 103.2 FL — HIGH (ref 80–100)
NRBC # FLD: 0.02 K/UL — SIGNIFICANT CHANGE UP (ref 0–0)
PHOSPHATE SERPL-MCNC: 3.9 MG/DL — SIGNIFICANT CHANGE UP (ref 2.5–4.5)
PLATELET # BLD AUTO: 195 K/UL — SIGNIFICANT CHANGE UP (ref 150–400)
PMV BLD: 10.2 FL — SIGNIFICANT CHANGE UP (ref 7–13)
POTASSIUM SERPL-MCNC: 3.6 MMOL/L — SIGNIFICANT CHANGE UP (ref 3.5–5.3)
POTASSIUM SERPL-SCNC: 3.6 MMOL/L — SIGNIFICANT CHANGE UP (ref 3.5–5.3)
RBC # BLD: 3.14 M/UL — LOW (ref 3.8–5.2)
RBC # FLD: 13.8 % — SIGNIFICANT CHANGE UP (ref 10.3–14.5)
SODIUM SERPL-SCNC: 147 MMOL/L — HIGH (ref 135–145)
WBC # BLD: 4.95 K/UL — SIGNIFICANT CHANGE UP (ref 3.8–10.5)
WBC # FLD AUTO: 4.95 K/UL — SIGNIFICANT CHANGE UP (ref 3.8–10.5)

## 2019-09-17 PROCEDURE — 43246 EGD PLACE GASTROSTOMY TUBE: CPT | Mod: GC

## 2019-09-17 PROCEDURE — 99233 SBSQ HOSP IP/OBS HIGH 50: CPT

## 2019-09-17 RX ADMIN — OLANZAPINE 2.5 MILLIGRAM(S): 15 TABLET, FILM COATED ORAL at 21:19

## 2019-09-17 RX ADMIN — Medication 28.75 MILLIGRAM(S): at 09:18

## 2019-09-17 RX ADMIN — LACOSAMIDE 120 MILLIGRAM(S): 50 TABLET ORAL at 16:51

## 2019-09-17 RX ADMIN — LEVETIRACETAM 400 MILLIGRAM(S): 250 TABLET, FILM COATED ORAL at 21:17

## 2019-09-17 RX ADMIN — LACOSAMIDE 120 MILLIGRAM(S): 50 TABLET ORAL at 21:36

## 2019-09-17 RX ADMIN — Medication 28.75 MILLIGRAM(S): at 18:15

## 2019-09-17 RX ADMIN — CHLORHEXIDINE GLUCONATE 1 APPLICATION(S): 213 SOLUTION TOPICAL at 06:47

## 2019-09-17 RX ADMIN — LEVETIRACETAM 400 MILLIGRAM(S): 250 TABLET, FILM COATED ORAL at 07:56

## 2019-09-17 RX ADMIN — Medication 28.75 MILLIGRAM(S): at 03:04

## 2019-09-17 RX ADMIN — LACOSAMIDE 120 MILLIGRAM(S): 50 TABLET ORAL at 06:47

## 2019-09-17 RX ADMIN — LEVETIRACETAM 400 MILLIGRAM(S): 250 TABLET, FILM COATED ORAL at 15:08

## 2019-09-17 NOTE — PROGRESS NOTE ADULT - SUBJECTIVE AND OBJECTIVE BOX
Patient is a 54y old  Female who presents with a chief complaint of Septic Shock (16 Sep 2019 12:46)      SUBJECTIVE / OVERNIGHT EVENTS: Pt unable to tolerate NGT placement yesterday. no acute events ON afebrile. review of systems unobtainable due to MR    MEDICATIONS  (STANDING):  ALBUTerol    90 MICROgram(s) HFA Inhaler 1 Puff(s) Inhalation every 4 hours  chlorhexidine 4% Liquid 1 Application(s) Topical <User Schedule>  Clobazam Suspension 10 milliGRAM(s) Oral two times a day  dextrose 5% + sodium chloride 0.45%. 1000 milliLiter(s) (100 mL/Hr) IV Continuous <Continuous>  enoxaparin Injectable 40 milliGRAM(s) SubCutaneous daily  lacosamide IVPB 100 milliGRAM(s) IV Intermittent <User Schedule>  levETIRAcetam  IVPB 1000 milliGRAM(s) IV Intermittent <User Schedule>  OLANZapine 7.5 milliGRAM(s) Oral at bedtime  OLANZapine Injectable 2.5 milliGRAM(s) IntraMuscular at bedtime  valproate sodium IVPB 750 milliGRAM(s) IV Intermittent every 8 hours    MEDICATIONS  (PRN):  sodium chloride 0.9% lock flush 10 milliLiter(s) IV Push every 1 hour PRN Pre/post blood products, medications, blood draw, and to maintain line patency        CAPILLARY BLOOD GLUCOSE        I&O's Summary      T(C): 36.4 (09-17-19 @ 06:45), Max: 36.6 (09-16-19 @ 12:26)  HR: 61 (09-17-19 @ 06:45) (57 - 76)  BP: 139/66 (09-17-19 @ 06:45) (118/72 - 139/66)  RR: 16 (09-17-19 @ 06:45) (16 - 19)  SpO2: 98% (09-17-19 @ 06:45) (97% - 100%)    PHYSICAL EXAM:  GENERAL: NAD, well-developed  HEAD:  Atraumatic, Normocephalic  CHEST/LUNG: diminished breath sounds at the bases  HEART: Regular rate and rhythm; No murmurs, rubs, or gallops  ABDOMEN: Soft, Nontender, Nondistended; Bowel sounds present  EXTREMITIES:  contractures    LABS:                        10.2   4.95  )-----------( 195      ( 17 Sep 2019 06:07 )             32.4     09-17    147<H>  |  103  |  27<H>  ----------------------------<  68<L>  3.6   |  32<H>  |  0.97    Ca    9.6      17 Sep 2019 06:07  Phos  3.9     09-17  Mg     1.7     09-17                  RADIOLOGY & ADDITIONAL TESTS:    Imaging Personally Reviewed:    Consultant(s) Notes Reviewed:      Care Discussed with Consultants/Other Providers:

## 2019-09-17 NOTE — CHART NOTE - NSCHARTNOTEFT_GEN_A_CORE
RDN contacted by ERIC HIGGINBOTHAM re: enteral recommendations.      Diet, NPO with Tube Feed:   Tube Feeding Modality: Nasogastric  Jevity 1.2 Shady (JEVITY1.2RTH)  Total Volume for 24 Hours (mL): 1200  Continuous  Starting Tube Feed Rate {mL per Hour}: 10  Increase Tube Feed Rate by (mL): 10     Every 4 hours  Until Goal Tube Feed Rate (mL per Hour): 50  Tube Feed Duration (in Hours): 24  Tube Feed Start Time: 08:00  No Carb Prosource (1pkg = 15gms Protein)     Qty per Day:  1 (09-16-19 @ 12:18)        As reported and noted upon chart review, Pt. had been tolerating enteral feedings of Jevity 1.2 @ 50mL/hr x24hrs with 1 packet (30mL) of Prosource via NGT.    Repeated swallow evaluation performed which remain indicative that PO nutrition/hydration deemed contraindicated, most recent 9/15/19.  Thus, currently Pt. is s/p PEG placement.      Continue previously prescribed enteral regimen.  Keep HOB >45 degrees and monitor tolerance to tube feedings.      RDN to remain available and f/u PRN.      Yaneli Cohen RDN, CDN  pager [phone]

## 2019-09-17 NOTE — PROGRESS NOTE ADULT - PROBLEM SELECTOR PLAN 1
- pulled her NGT on 9/14/19 & 9/16 -- attempted to replace but patient not tolerating NGT and kept pulling them out  - failed S&S today even with group home at bedside for assistance  - mom agreed to PEG tube  - repeat S/S-failed recommend non oral means of nutrition-Medically stable for PEG placement   -patient currently NPO on D5 1/2 NS  -PEG today

## 2019-09-17 NOTE — PROGRESS NOTE ADULT - PROBLEM SELECTOR PLAN 6
- pt is alert, nonverbal, tracks  - does not follow commands  - at baseline as per group home  - functional quadriplegia  - supportive care

## 2019-09-18 PROCEDURE — 99233 SBSQ HOSP IP/OBS HIGH 50: CPT

## 2019-09-18 PROCEDURE — 99232 SBSQ HOSP IP/OBS MODERATE 35: CPT | Mod: GC

## 2019-09-18 RX ORDER — CLOBAZAM 10 MG/1
10 TABLET ORAL
Refills: 0 | Status: DISCONTINUED | OUTPATIENT
Start: 2019-09-18 | End: 2019-09-19

## 2019-09-18 RX ORDER — LEVETIRACETAM 250 MG/1
1000 TABLET, FILM COATED ORAL
Refills: 0 | Status: DISCONTINUED | OUTPATIENT
Start: 2019-09-18 | End: 2019-09-24

## 2019-09-18 RX ORDER — OLANZAPINE 15 MG/1
2.5 TABLET, FILM COATED ORAL AT BEDTIME
Refills: 0 | Status: DISCONTINUED | OUTPATIENT
Start: 2019-09-18 | End: 2019-09-24

## 2019-09-18 RX ORDER — LACOSAMIDE 50 MG/1
100 TABLET ORAL
Refills: 0 | Status: DISCONTINUED | OUTPATIENT
Start: 2019-09-18 | End: 2019-09-19

## 2019-09-18 RX ORDER — VALPROIC ACID (AS SODIUM SALT) 250 MG/5ML
750 SOLUTION, ORAL ORAL EVERY 8 HOURS
Refills: 0 | Status: DISCONTINUED | OUTPATIENT
Start: 2019-09-18 | End: 2019-09-24

## 2019-09-18 RX ORDER — VALPROIC ACID (AS SODIUM SALT) 250 MG/5ML
750 SOLUTION, ORAL ORAL EVERY 8 HOURS
Refills: 0 | Status: DISCONTINUED | OUTPATIENT
Start: 2019-09-18 | End: 2019-09-18

## 2019-09-18 RX ADMIN — Medication 750 MILLIGRAM(S): at 23:44

## 2019-09-18 RX ADMIN — LEVETIRACETAM 1000 MILLIGRAM(S): 250 TABLET, FILM COATED ORAL at 21:05

## 2019-09-18 RX ADMIN — Medication 28.75 MILLIGRAM(S): at 11:09

## 2019-09-18 RX ADMIN — Medication 28.75 MILLIGRAM(S): at 03:20

## 2019-09-18 RX ADMIN — ENOXAPARIN SODIUM 40 MILLIGRAM(S): 100 INJECTION SUBCUTANEOUS at 11:09

## 2019-09-18 RX ADMIN — LACOSAMIDE 120 MILLIGRAM(S): 50 TABLET ORAL at 05:18

## 2019-09-18 RX ADMIN — LEVETIRACETAM 400 MILLIGRAM(S): 250 TABLET, FILM COATED ORAL at 08:09

## 2019-09-18 RX ADMIN — CLOBAZAM 10 MILLIGRAM(S): 10 TABLET ORAL at 18:44

## 2019-09-18 RX ADMIN — CHLORHEXIDINE GLUCONATE 1 APPLICATION(S): 213 SOLUTION TOPICAL at 05:18

## 2019-09-18 RX ADMIN — CLOBAZAM 10 MILLIGRAM(S): 10 TABLET ORAL at 06:22

## 2019-09-18 RX ADMIN — LEVETIRACETAM 400 MILLIGRAM(S): 250 TABLET, FILM COATED ORAL at 13:12

## 2019-09-18 RX ADMIN — LACOSAMIDE 120 MILLIGRAM(S): 50 TABLET ORAL at 13:12

## 2019-09-18 RX ADMIN — OLANZAPINE 2.5 MILLIGRAM(S): 15 TABLET, FILM COATED ORAL at 21:05

## 2019-09-18 RX ADMIN — LACOSAMIDE 100 MILLIGRAM(S): 50 TABLET ORAL at 21:05

## 2019-09-18 NOTE — PROGRESS NOTE ADULT - ASSESSMENT
Impression:  1) Oropharyngeal dysphagia secondary to cerebral palsy     Recommendations:  - Change dressing once per day  - May use PEG today for feeding  - Nutrition consult for formula Impression:  1) Oropharyngeal dysphagia secondary to cerebral palsy status post PEG tube    Recommendations:  - Change dressing once per day  - May use PEG today for feeding  - Nutrition consult for formula  - Aspiration precautions  - Abdominal binder to minimize risk of inadvertent removal of tube

## 2019-09-18 NOTE — PROGRESS NOTE ADULT - PROBLEM SELECTOR PLAN 1
- pulled her NGT on 9/14/19 & 9/16 -- attempted to replace but patient not tolerating NGT and kept pulling them out  - failed S&S today even with group home at bedside for assistance  - mom agreed to PEG tube  - repeat S/S-failed recommend non oral means of nutrition  - s/p PEG start tube feeds Jevity as per nutrition recs start @ 10 ml advance q4 hrs to goal 50 ml/hr with prosource

## 2019-09-18 NOTE — PROGRESS NOTE ADULT - SUBJECTIVE AND OBJECTIVE BOX
Patient is a 54y old  Female who presents with a chief complaint of Septic Shock (18 Sep 2019 08:35)      SUBJECTIVE / OVERNIGHT EVENTS: Pt s/p PEG     MEDICATIONS  (STANDING):  ALBUTerol    90 MICROgram(s) HFA Inhaler 1 Puff(s) Inhalation every 4 hours  chlorhexidine 4% Liquid 1 Application(s) Topical <User Schedule>  Clobazam Suspension 10 milliGRAM(s) Oral two times a day  dextrose 5% + sodium chloride 0.45%. 1000 milliLiter(s) (100 mL/Hr) IV Continuous <Continuous>  enoxaparin Injectable 40 milliGRAM(s) SubCutaneous daily  lacosamide IVPB 100 milliGRAM(s) IV Intermittent <User Schedule>  levETIRAcetam  IVPB 1000 milliGRAM(s) IV Intermittent <User Schedule>  OLANZapine 7.5 milliGRAM(s) Oral at bedtime  OLANZapine Injectable 2.5 milliGRAM(s) IntraMuscular at bedtime  valproate sodium IVPB 750 milliGRAM(s) IV Intermittent every 8 hours    MEDICATIONS  (PRN):  sodium chloride 0.9% lock flush 10 milliLiter(s) IV Push every 1 hour PRN Pre/post blood products, medications, blood draw, and to maintain line patency        CAPILLARY BLOOD GLUCOSE        I&O's Summary      T(C): 36.2 (09-18-19 @ 05:20), Max: 36.4 (09-17-19 @ 21:35)  HR: 67 (09-18-19 @ 05:20) (65 - 68)  BP: 130/94 (09-18-19 @ 05:20) (130/94 - 141/75)  RR: 20 (09-18-19 @ 05:20) (18 - 20)  SpO2: 98% (09-18-19 @ 05:20) (97% - 98%)    PHYSICAL EXAM:  GENERAL: NAD, well-developed  HEAD:  Atraumatic, Normocephalic  CHEST/LUNG: diminished breath sounds at the bases  HEART: Regular rate and rhythm; No murmurs, rubs, or gallops  ABDOMEN: Soft, Nontender, Nondistended; Bowel sounds present +PEG   EXTREMITIES:  contractures    LABS:                        10.2   4.95  )-----------( 195      ( 17 Sep 2019 06:07 )             32.4     09-17    147<H>  |  103  |  27<H>  ----------------------------<  68<L>  3.6   |  32<H>  |  0.97    Ca    9.6      17 Sep 2019 06:07  Phos  3.9     09-17  Mg     1.7     09-17                  RADIOLOGY & ADDITIONAL TESTS:    Imaging Personally Reviewed:    Consultant(s) Notes Reviewed:      Care Discussed with Consultants/Other Providers: Patient is a 54y old  Female who presents with a chief complaint of Septic Shock (18 Sep 2019 08:35)      SUBJECTIVE / OVERNIGHT EVENTS: Pt s/p PEG no acute events ON. afebrile. denies N/V/D    MEDICATIONS  (STANDING):  ALBUTerol    90 MICROgram(s) HFA Inhaler 1 Puff(s) Inhalation every 4 hours  chlorhexidine 4% Liquid 1 Application(s) Topical <User Schedule>  Clobazam Suspension 10 milliGRAM(s) Oral two times a day  dextrose 5% + sodium chloride 0.45%. 1000 milliLiter(s) (100 mL/Hr) IV Continuous <Continuous>  enoxaparin Injectable 40 milliGRAM(s) SubCutaneous daily  lacosamide IVPB 100 milliGRAM(s) IV Intermittent <User Schedule>  levETIRAcetam  IVPB 1000 milliGRAM(s) IV Intermittent <User Schedule>  OLANZapine 7.5 milliGRAM(s) Oral at bedtime  OLANZapine Injectable 2.5 milliGRAM(s) IntraMuscular at bedtime  valproate sodium IVPB 750 milliGRAM(s) IV Intermittent every 8 hours    MEDICATIONS  (PRN):  sodium chloride 0.9% lock flush 10 milliLiter(s) IV Push every 1 hour PRN Pre/post blood products, medications, blood draw, and to maintain line patency        CAPILLARY BLOOD GLUCOSE        I&O's Summary      T(C): 36.2 (09-18-19 @ 05:20), Max: 36.4 (09-17-19 @ 21:35)  HR: 67 (09-18-19 @ 05:20) (65 - 68)  BP: 130/94 (09-18-19 @ 05:20) (130/94 - 141/75)  RR: 20 (09-18-19 @ 05:20) (18 - 20)  SpO2: 98% (09-18-19 @ 05:20) (97% - 98%)    PHYSICAL EXAM:  GENERAL: NAD, well-developed  HEAD:  Atraumatic, Normocephalic  CHEST/LUNG: diminished breath sounds at the bases  HEART: Regular rate and rhythm; No murmurs, rubs, or gallops  ABDOMEN: Soft, Nontender, Nondistended; Bowel sounds present +PEG   EXTREMITIES:  contractures    LABS:                        10.2   4.95  )-----------( 195      ( 17 Sep 2019 06:07 )             32.4     09-17    147<H>  |  103  |  27<H>  ----------------------------<  68<L>  3.6   |  32<H>  |  0.97    Ca    9.6      17 Sep 2019 06:07  Phos  3.9     09-17  Mg     1.7     09-17                  RADIOLOGY & ADDITIONAL TESTS:    Imaging Personally Reviewed:    Consultant(s) Notes Reviewed:      Care Discussed with Consultants/Other Providers:

## 2019-09-18 NOTE — PROGRESS NOTE ADULT - PROBLEM SELECTOR PLAN 6
- pt is alert, nonverbal, tracks  - does not follow commands  - at baseline as per group home  - functional quadriplegia  - supportive care - pt is alert, nonverbal, tracks  - does not follow commands  - at baseline as per group home  - functional quadriplegia  - zyprexa 2.5 mg PEG at bedtime   - supportive care

## 2019-09-18 NOTE — PROGRESS NOTE ADULT - PROBLEM SELECTOR PLAN 4
- VEEG with potential epileptogenic focus in the right fronto-temporal region  - no further seizure activity noted  - cont AEDs as ordered - VEEG with potential epileptogenic focus in the right fronto-temporal region  - no further seizure activity noted  - change antiepileptics to PO via PEG - VEEG with potential epileptogenic focus in the right fronto-temporal region  - no further seizure activity noted  - changed antiepileptics to via PEG

## 2019-09-18 NOTE — PROGRESS NOTE ADULT - SUBJECTIVE AND OBJECTIVE BOX
ANESTHESIA POSTOP CHECK    54y Female POSTOP DAY 1 S/P     Vital Signs Last 24 Hrs  T(C): 36.2 (18 Sep 2019 05:20), Max: 36.4 (17 Sep 2019 21:35)  T(F): 97.2 (18 Sep 2019 05:20), Max: 97.5 (17 Sep 2019 21:35)  HR: 67 (18 Sep 2019 05:20) (65 - 68)  BP: 130/94 (18 Sep 2019 05:20) (130/94 - 141/75)  BP(mean): --  RR: 20 (18 Sep 2019 05:20) (18 - 20)  SpO2: 98% (18 Sep 2019 05:20) (97% - 98%)  I&O's Summary      [ ] NO APPARENT ANESTHESIA COMPLICATIONS      Comments:

## 2019-09-18 NOTE — PROGRESS NOTE ADULT - ASSESSMENT
54F h/o cerebral palsy and seizure disorder from group home a/w acute AMS and increased work of breathing with recent seizure a/w septic shock 2/2 to aspiration PNA s/p MICU stay requiring pressors and airway support, intermittently requiring pressors, seizures controlled with 4 AEDs. Now extubated, off pressors, on midodrine with oropharyngeal dysphagia secondary to cerebral palsy s/p PEG 9/17

## 2019-09-18 NOTE — PROGRESS NOTE ADULT - SUBJECTIVE AND OBJECTIVE BOX
Chief Complaint:  Patient is a 54y old  Female who presents with a chief complaint of Septic Shock (17 Sep 2019 08:24)      Interval Events: S/P PEG.   ROS: All 12 point system except listed above were otherwise negative.    Allergies:  Topamax (Unknown)        Hospital Medications:  ALBUTerol    90 MICROgram(s) HFA Inhaler 1 Puff(s) Inhalation every 4 hours  chlorhexidine 4% Liquid 1 Application(s) Topical <User Schedule>  Clobazam Suspension 10 milliGRAM(s) Oral two times a day  dextrose 5% + sodium chloride 0.45%. 1000 milliLiter(s) IV Continuous <Continuous>  enoxaparin Injectable 40 milliGRAM(s) SubCutaneous daily  lacosamide IVPB 100 milliGRAM(s) IV Intermittent <User Schedule>  levETIRAcetam  IVPB 1000 milliGRAM(s) IV Intermittent <User Schedule>  OLANZapine 7.5 milliGRAM(s) Oral at bedtime  OLANZapine Injectable 2.5 milliGRAM(s) IntraMuscular at bedtime  sodium chloride 0.9% lock flush 10 milliLiter(s) IV Push every 1 hour PRN  valproate sodium IVPB 750 milliGRAM(s) IV Intermittent every 8 hours      PMHX/PSHX:  Intellectual disability  Constipation  Seizure disorder  Cerebral palsy  No significant past surgical history      Family history:  No pertinent family history in first degree relatives  No pertinent family history in first degree relatives    There is no family history of peptic ulcer disease, gastric cancer, colon polyps, colon cancer, celiac disease, biliary, hepatic, or pancreatic disease.  None of the female relatives have breast, uterine, or ovarian cancer.     PHYSICAL EXAM:   Vital Signs:  Vital Signs Last 24 Hrs  T(C): 36.2 (18 Sep 2019 05:20), Max: 36.4 (17 Sep 2019 21:35)  T(F): 97.2 (18 Sep 2019 05:20), Max: 97.5 (17 Sep 2019 21:35)  HR: 67 (18 Sep 2019 05:20) (65 - 68)  BP: 130/94 (18 Sep 2019 05:20) (130/94 - 141/75)  BP(mean): --  RR: 20 (18 Sep 2019 05:20) (18 - 20)  SpO2: 98% (18 Sep 2019 05:20) (97% - 98%)  Daily     Daily     PHYSICAL EXAM:   GENERAL:  NAD, Appears stated age  HEENT:  NC/AT,  conjunctivae clear and pink, sclera -anicteric  CHEST:  no wheezes or rales  HEART:  RRR S1/S2,   ABDOMEN:  Soft, non-tender, non-distended, PEG site gauze c/d/i, external bumper at 3cm  EXTREMITIES:  contractures   SKIN:  Warm & Dry. No rash or erythema  NEURO:  unable to asses due to her baseline cerebral palsy     LABS:                        10.2   4.95  )-----------( 195      ( 17 Sep 2019 06:07 )             32.4       09-17    147<H>  |  103  |  27<H>  ----------------------------<  68<L>  3.6   |  32<H>  |  0.97    Ca    9.6      17 Sep 2019 06:07  Phos  3.9     09-17  Mg     1.7     09-17                                    10.2   4.95  )-----------( 195      ( 17 Sep 2019 06:07 )             32.4     Imaging:      < from: Upper Endoscopy (09.17.19 @ 11:08) >  Findings:       The examined esophagus was normal.       The entire examined stomach was normal.       The examined duodenum was normal.    The patient was placed in the supine position for PEG placement. The        stomach was insufflated to appose gastric and abdominal walls. A site        was located in the body of the stomach. The abdominal wall was marked        and prepped in a sterile manner. The area was anesthetized with 4 mL of        1% lidocaine. The trocar needle was introduced through the abdominal        wall and into the stomach under direct endoscopic view. A snare was        introduced through the endoscope and opened in the gastric lumen. The        guide wire was passed through the trocar and into the open snare. The        snare was closed around the guide wire. The endoscope and snare were        removed, pulling the wire out through the mouth. A skin incision was        made at the site of needle insertion. The Halyard gastrostomy tube was        lubricated. The G-tube was tied to the guide wire and pulled through the        mouth and into the stomach. The trocar needle was removed, and the        gastrostomy tube was pulled out from the stomach through the skin. The        external bumper was attached to the gastrostomy tube, and the tube was        cut to remove the guide wire. The final position of the gastrostomy tube        was confirmed by relook endoscopy, and skin marking noted to be 2.5 cm        at the external bumper. The final tension and compression of the        abdominal wall by the PEG tube and external bumper were checked and        revealed that the bumper was loose and lightly touching the skin. The        feeding tube was capped, and the tube site cleaned and dressed.                                                                                   Impression:          - A PEG placement was successfully completed.                  - No specimens collected.  Recommendation:      - [facility]                       - Please follow the post-PEG recommendations including:                       - change dressing once per day                    - NPO x4 hrs then meds/water today                       - If clinically stable, may use PEG tomorrow for                        feedings.                                                                                   Attending Participation:       I was present and participated during the entire procedure, including        non-key portions.                                                      < end of copied text > Chief Complaint:  Patient is a 54y old  Female who presents with a chief complaint of Septic Shock (17 Sep 2019 08:24)      Interval Events: S/P PEG.  No events overnight  ROS: All 12 point system except listed above were otherwise negative.    Allergies:  Topamax (Unknown)        Hospital Medications:  ALBUTerol    90 MICROgram(s) HFA Inhaler 1 Puff(s) Inhalation every 4 hours  chlorhexidine 4% Liquid 1 Application(s) Topical <User Schedule>  Clobazam Suspension 10 milliGRAM(s) Oral two times a day  dextrose 5% + sodium chloride 0.45%. 1000 milliLiter(s) IV Continuous <Continuous>  enoxaparin Injectable 40 milliGRAM(s) SubCutaneous daily  lacosamide IVPB 100 milliGRAM(s) IV Intermittent <User Schedule>  levETIRAcetam  IVPB 1000 milliGRAM(s) IV Intermittent <User Schedule>  OLANZapine 7.5 milliGRAM(s) Oral at bedtime  OLANZapine Injectable 2.5 milliGRAM(s) IntraMuscular at bedtime  sodium chloride 0.9% lock flush 10 milliLiter(s) IV Push every 1 hour PRN  valproate sodium IVPB 750 milliGRAM(s) IV Intermittent every 8 hours      PMHX/PSHX:  Intellectual disability  Constipation  Seizure disorder  Cerebral palsy  No significant past surgical history      Family history:  No pertinent family history in first degree relatives  No pertinent family history in first degree relatives    There is no family history of peptic ulcer disease, gastric cancer, colon polyps, colon cancer, celiac disease, biliary, hepatic, or pancreatic disease.  None of the female relatives have breast, uterine, or ovarian cancer.     PHYSICAL EXAM:   Vital Signs:  Vital Signs Last 24 Hrs  T(C): 36.2 (18 Sep 2019 05:20), Max: 36.4 (17 Sep 2019 21:35)  T(F): 97.2 (18 Sep 2019 05:20), Max: 97.5 (17 Sep 2019 21:35)  HR: 67 (18 Sep 2019 05:20) (65 - 68)  BP: 130/94 (18 Sep 2019 05:20) (130/94 - 141/75)  BP(mean): --  RR: 20 (18 Sep 2019 05:20) (18 - 20)  SpO2: 98% (18 Sep 2019 05:20) (97% - 98%)  Daily     Daily     PHYSICAL EXAM:   GENERAL:  NAD, Appears stated age  HEENT:  NC/AT,  conjunctivae clear and pink, sclera -anicteric  CHEST:  no wheezes or rales  HEART:  RRR S1/S2,   ABDOMEN:  Soft, non-tender, non-distended, PEG site gauze c/d/i, external bumper at 3cm  EXTREMITIES:  contractures   SKIN:  Warm & Dry. No rash or erythema  NEURO:  unable to asses due to her baseline cerebral palsy     LABS:                        10.2   4.95  )-----------( 195      ( 17 Sep 2019 06:07 )             32.4       09-17    147<H>  |  103  |  27<H>  ----------------------------<  68<L>  3.6   |  32<H>  |  0.97    Ca    9.6      17 Sep 2019 06:07  Phos  3.9     09-17  Mg     1.7     09-17                                    10.2   4.95  )-----------( 195      ( 17 Sep 2019 06:07 )             32.4     Imaging:      < from: Upper Endoscopy (09.17.19 @ 11:08) >  Findings:       The examined esophagus was normal.       The entire examined stomach was normal.       The examined duodenum was normal.    The patient was placed in the supine position for PEG placement. The        stomach was insufflated to appose gastric and abdominal walls. A site        was located in the body of the stomach. The abdominal wall was marked        and prepped in a sterile manner. The area was anesthetized with 4 mL of        1% lidocaine. The trocar needle was introduced through the abdominal        wall and into the stomach under direct endoscopic view. A snare was        introduced through the endoscope and opened in the gastric lumen. The        guide wire was passed through the trocar and into the open snare. The        snare was closed around the guide wire. The endoscope and snare were        removed, pulling the wire out through the mouth. A skin incision was        made at the site of needle insertion. The Halyard gastrostomy tube was        lubricated. The G-tube was tied to the guide wire and pulled through the        mouth and into the stomach. The trocar needle was removed, and the        gastrostomy tube was pulled out from the stomach through the skin. The        external bumper was attached to the gastrostomy tube, and the tube was        cut to remove the guide wire. The final position of the gastrostomy tube        was confirmed by relook endoscopy, and skin marking noted to be 2.5 cm        at the external bumper. The final tension and compression of the        abdominal wall by the PEG tube and external bumper were checked and        revealed that the bumper was loose and lightly touching the skin. The        feeding tube was capped, and the tube site cleaned and dressed.                                                                                   Impression:          - A PEG placement was successfully completed.                  - No specimens collected.  Recommendation:      - [facility]                       - Please follow the post-PEG recommendations including:                       - change dressing once per day                    - NPO x4 hrs then meds/water today                       - If clinically stable, may use PEG tomorrow for                        feedings.                                                                                   Attending Participation:       I was present and participated during the entire procedure, including        non-key portions.                                                      < end of copied text >

## 2019-09-19 DIAGNOSIS — E87.6 HYPOKALEMIA: ICD-10-CM

## 2019-09-19 LAB
ANION GAP SERPL CALC-SCNC: 12 MMO/L — SIGNIFICANT CHANGE UP (ref 7–14)
BUN SERPL-MCNC: 14 MG/DL — SIGNIFICANT CHANGE UP (ref 7–23)
CALCIUM SERPL-MCNC: 8.8 MG/DL — SIGNIFICANT CHANGE UP (ref 8.4–10.5)
CHLORIDE SERPL-SCNC: 100 MMOL/L — SIGNIFICANT CHANGE UP (ref 98–107)
CO2 SERPL-SCNC: 31 MMOL/L — SIGNIFICANT CHANGE UP (ref 22–31)
CREAT SERPL-MCNC: 0.73 MG/DL — SIGNIFICANT CHANGE UP (ref 0.5–1.3)
GLUCOSE SERPL-MCNC: 147 MG/DL — HIGH (ref 70–99)
MAGNESIUM SERPL-MCNC: 1.6 MG/DL — SIGNIFICANT CHANGE UP (ref 1.6–2.6)
POTASSIUM SERPL-MCNC: 2.9 MMOL/L — CRITICAL LOW (ref 3.5–5.3)
POTASSIUM SERPL-SCNC: 2.9 MMOL/L — CRITICAL LOW (ref 3.5–5.3)
SODIUM SERPL-SCNC: 143 MMOL/L — SIGNIFICANT CHANGE UP (ref 135–145)

## 2019-09-19 PROCEDURE — 99232 SBSQ HOSP IP/OBS MODERATE 35: CPT

## 2019-09-19 RX ORDER — CLOBAZAM 10 MG/1
10 TABLET ORAL
Refills: 0 | Status: DISCONTINUED | OUTPATIENT
Start: 2019-09-19 | End: 2019-09-24

## 2019-09-19 RX ORDER — LACOSAMIDE 50 MG/1
100 TABLET ORAL
Refills: 0 | Status: DISCONTINUED | OUTPATIENT
Start: 2019-09-19 | End: 2019-09-24

## 2019-09-19 RX ORDER — POTASSIUM CHLORIDE 20 MEQ
10 PACKET (EA) ORAL
Refills: 0 | Status: COMPLETED | OUTPATIENT
Start: 2019-09-19 | End: 2019-09-19

## 2019-09-19 RX ORDER — POTASSIUM CHLORIDE 20 MEQ
40 PACKET (EA) ORAL ONCE
Refills: 0 | Status: COMPLETED | OUTPATIENT
Start: 2019-09-19 | End: 2019-09-19

## 2019-09-19 RX ADMIN — Medication 100 MILLIEQUIVALENT(S): at 11:44

## 2019-09-19 RX ADMIN — ENOXAPARIN SODIUM 40 MILLIGRAM(S): 100 INJECTION SUBCUTANEOUS at 11:45

## 2019-09-19 RX ADMIN — LEVETIRACETAM 1000 MILLIGRAM(S): 250 TABLET, FILM COATED ORAL at 14:04

## 2019-09-19 RX ADMIN — Medication 100 MILLIEQUIVALENT(S): at 12:41

## 2019-09-19 RX ADMIN — LACOSAMIDE 100 MILLIGRAM(S): 50 TABLET ORAL at 05:18

## 2019-09-19 RX ADMIN — Medication 750 MILLIGRAM(S): at 05:19

## 2019-09-19 RX ADMIN — Medication 40 MILLIEQUIVALENT(S): at 11:45

## 2019-09-19 RX ADMIN — Medication 750 MILLIGRAM(S): at 21:53

## 2019-09-19 RX ADMIN — OLANZAPINE 2.5 MILLIGRAM(S): 15 TABLET, FILM COATED ORAL at 21:53

## 2019-09-19 RX ADMIN — CLOBAZAM 10 MILLIGRAM(S): 10 TABLET ORAL at 17:29

## 2019-09-19 RX ADMIN — Medication 750 MILLIGRAM(S): at 14:04

## 2019-09-19 RX ADMIN — CHLORHEXIDINE GLUCONATE 1 APPLICATION(S): 213 SOLUTION TOPICAL at 05:19

## 2019-09-19 RX ADMIN — CLOBAZAM 10 MILLIGRAM(S): 10 TABLET ORAL at 05:18

## 2019-09-19 RX ADMIN — LACOSAMIDE 100 MILLIGRAM(S): 50 TABLET ORAL at 23:12

## 2019-09-19 RX ADMIN — LEVETIRACETAM 1000 MILLIGRAM(S): 250 TABLET, FILM COATED ORAL at 21:42

## 2019-09-19 RX ADMIN — LACOSAMIDE 100 MILLIGRAM(S): 50 TABLET ORAL at 14:04

## 2019-09-19 RX ADMIN — SODIUM CHLORIDE 100 MILLILITER(S): 9 INJECTION, SOLUTION INTRAVENOUS at 05:19

## 2019-09-19 RX ADMIN — SODIUM CHLORIDE 100 MILLILITER(S): 9 INJECTION, SOLUTION INTRAVENOUS at 15:03

## 2019-09-19 RX ADMIN — Medication 100 MILLIEQUIVALENT(S): at 14:04

## 2019-09-19 RX ADMIN — LEVETIRACETAM 1000 MILLIGRAM(S): 250 TABLET, FILM COATED ORAL at 09:06

## 2019-09-19 NOTE — PROGRESS NOTE ADULT - PROBLEM SELECTOR PLAN 7
- pt is alert, nonverbal, tracks  - does not follow commands  - at baseline as per group home  - functional quadriplegia  - zyprexa 2.5 mg PEG at bedtime   - supportive care

## 2019-09-19 NOTE — PROGRESS NOTE ADULT - PROBLEM SELECTOR PLAN 1
- pulled her NGT on 9/14/19 & 9/16 -- attempted to replace but patient not tolerating NGT and kept pulling them out  - failed S&S today even with group home at bedside for assistance  - mom agreed to PEG tube  - repeat S/S-failed recommend non oral means of nutrition  - s/p PEG start tube feeds Jevity as per nutrition recs start @ 10 ml advance q4 hrs to goal 50 ml/hr with prosource- tolerating feeds

## 2019-09-19 NOTE — PROVIDER CONTACT NOTE (CRITICAL VALUE NOTIFICATION) - BACKGROUND
54 nonverbal F from group home PMHx CP a/w AMS, increased WOB with recent seizure a/w septic shock 2/2 aspiration PNA S/P MICU for pressors, airway support. S/P extubation 9/3.
pt dx pna

## 2019-09-19 NOTE — PROGRESS NOTE ADULT - SUBJECTIVE AND OBJECTIVE BOX
Patient is a 54y old  Female who presents with a chief complaint of Septic Shock (18 Sep 2019 14:09)      SUBJECTIVE / OVERNIGHT EVENTS: Pt in NAD no acute events ON. review of systems unobtainable due to MR      MEDICATIONS  (STANDING):  ALBUTerol    90 MICROgram(s) HFA Inhaler 1 Puff(s) Inhalation every 4 hours  chlorhexidine 4% Liquid 1 Application(s) Topical <User Schedule>  Clobazam 10 milliGRAM(s) Oral two times a day  dextrose 5% + sodium chloride 0.45%. 1000 milliLiter(s) (100 mL/Hr) IV Continuous <Continuous>  enoxaparin Injectable 40 milliGRAM(s) SubCutaneous daily  lacosamide Solution 100 milliGRAM(s) Oral <User Schedule>  levETIRAcetam  Solution 1000 milliGRAM(s) Oral <User Schedule>  OLANZapine 2.5 milliGRAM(s) Oral at bedtime  valproic  acid Syrup 750 milliGRAM(s) Oral every 8 hours    MEDICATIONS  (PRN):  sodium chloride 0.9% lock flush 10 milliLiter(s) IV Push every 1 hour PRN Pre/post blood products, medications, blood draw, and to maintain line patency        CAPILLARY BLOOD GLUCOSE        I&O's Summary    19 Sep 2019 07:01  -  19 Sep 2019 14:20  --------------------------------------------------------  IN: 600 mL / OUT: 0 mL / NET: 600 mL        T(C): 36.4 (09-19-19 @ 05:16), Max: 36.4 (09-19-19 @ 05:16)  HR: 64 (09-19-19 @ 05:16) (64 - 71)  BP: 143/80 (09-19-19 @ 05:16) (122/79 - 143/80)  RR: 16 (09-19-19 @ 05:16) (16 - 18)  SpO2: 100% (09-19-19 @ 05:16) (97% - 100%)    PHYSICAL EXAM:  GENERAL: NAD, well-developed  HEAD:  Atraumatic, Normocephalic  CHEST/LUNG: diminished breath sounds at the bases  HEART: Regular rate and rhythm; No murmurs, rubs, or gallops  ABDOMEN: Soft, Nontender, Nondistended; Bowel sounds present +PEG   EXTREMITIES:  contractures  LABS:    09-19    143  |  100  |  14  ----------------------------<  147<H>  2.9<LL>   |  31  |  0.73    Ca    8.8      19 Sep 2019 09:25  Mg     1.6     09-19                  RADIOLOGY & ADDITIONAL TESTS:    Imaging Personally Reviewed:    Consultant(s) Notes Reviewed:      Care Discussed with Consultants/Other Providers:

## 2019-09-19 NOTE — PROGRESS NOTE ADULT - PROBLEM SELECTOR PLAN 5
- VEEG with potential epileptogenic focus in the right fronto-temporal region  - no further seizure activity noted  - changed antiepileptics to via PEG

## 2019-09-19 NOTE — PROVIDER CONTACT NOTE (CRITICAL VALUE NOTIFICATION) - RECOMMENDATIONS
will consult with team, place pt on contact
New order: Potassium [id number] mEq x3 doses and potassium chloride powder.

## 2019-09-20 LAB
ANION GAP SERPL CALC-SCNC: 15 MMO/L — HIGH (ref 7–14)
BUN SERPL-MCNC: 12 MG/DL — SIGNIFICANT CHANGE UP (ref 7–23)
CALCIUM SERPL-MCNC: 8.9 MG/DL — SIGNIFICANT CHANGE UP (ref 8.4–10.5)
CHLORIDE SERPL-SCNC: 100 MMOL/L — SIGNIFICANT CHANGE UP (ref 98–107)
CO2 SERPL-SCNC: 25 MMOL/L — SIGNIFICANT CHANGE UP (ref 22–31)
CREAT SERPL-MCNC: 0.66 MG/DL — SIGNIFICANT CHANGE UP (ref 0.5–1.3)
GLUCOSE SERPL-MCNC: 118 MG/DL — HIGH (ref 70–99)
POTASSIUM SERPL-MCNC: 4.7 MMOL/L — SIGNIFICANT CHANGE UP (ref 3.5–5.3)
POTASSIUM SERPL-SCNC: 4.7 MMOL/L — SIGNIFICANT CHANGE UP (ref 3.5–5.3)
SODIUM SERPL-SCNC: 140 MMOL/L — SIGNIFICANT CHANGE UP (ref 135–145)

## 2019-09-20 PROCEDURE — 99232 SBSQ HOSP IP/OBS MODERATE 35: CPT

## 2019-09-20 RX ADMIN — LEVETIRACETAM 1000 MILLIGRAM(S): 250 TABLET, FILM COATED ORAL at 20:15

## 2019-09-20 RX ADMIN — CLOBAZAM 10 MILLIGRAM(S): 10 TABLET ORAL at 05:45

## 2019-09-20 RX ADMIN — Medication 750 MILLIGRAM(S): at 21:54

## 2019-09-20 RX ADMIN — ENOXAPARIN SODIUM 40 MILLIGRAM(S): 100 INJECTION SUBCUTANEOUS at 12:10

## 2019-09-20 RX ADMIN — OLANZAPINE 2.5 MILLIGRAM(S): 15 TABLET, FILM COATED ORAL at 21:54

## 2019-09-20 RX ADMIN — CHLORHEXIDINE GLUCONATE 1 APPLICATION(S): 213 SOLUTION TOPICAL at 05:46

## 2019-09-20 RX ADMIN — LACOSAMIDE 100 MILLIGRAM(S): 50 TABLET ORAL at 07:49

## 2019-09-20 RX ADMIN — LEVETIRACETAM 1000 MILLIGRAM(S): 250 TABLET, FILM COATED ORAL at 07:49

## 2019-09-20 RX ADMIN — LACOSAMIDE 100 MILLIGRAM(S): 50 TABLET ORAL at 13:16

## 2019-09-20 RX ADMIN — LACOSAMIDE 100 MILLIGRAM(S): 50 TABLET ORAL at 21:54

## 2019-09-20 RX ADMIN — LEVETIRACETAM 1000 MILLIGRAM(S): 250 TABLET, FILM COATED ORAL at 13:16

## 2019-09-20 RX ADMIN — Medication 750 MILLIGRAM(S): at 13:16

## 2019-09-20 RX ADMIN — Medication 750 MILLIGRAM(S): at 05:45

## 2019-09-20 RX ADMIN — CLOBAZAM 10 MILLIGRAM(S): 10 TABLET ORAL at 17:30

## 2019-09-20 NOTE — PROGRESS NOTE ADULT - PROBLEM SELECTOR PLAN 9
- lovenox  40units subq daily  Dispo: await rehab needs level II prior to discharge
- lovenox  40units subq daily  Dispo: await rehab needs level II prior to discharge

## 2019-09-20 NOTE — PROVIDER CONTACT NOTE (OTHER) - RECOMMENDATIONS
No new order
Heat packs placed, Heating blanket requested.
RN recommended Sputum induction by respiratory.
Will not replace NG.  Repeat speech and swallow to be done, if patient does not do well, possible plan for PEG on 9/16-17
Provider evaluate Pt. 12 Lead ECG.
Notify provider.
Notify provider.
Will tell provider, once distribution is complete.
Bolus and labs

## 2019-09-20 NOTE — PROVIDER CONTACT NOTE (OTHER) - SITUATION
patient pulled out NGT
BP 80/56 manual   Pulse 78
Pt found to be tachycardic on continuous pulse ox monitor
CBC Clotted
Patient's temp increased to 95.1, but still low. Hypothermia blanket maintained.
Patient's temp is decreased to 94. Patient has been on the hypothermia blanket since 6pm. Dr. Lisker is aware of decreased temp per day RN.
Pt removed NG tube
Pt unable to give sputum sample, RN tried nasal suction.
Temp -93.1 (rectal). No heating blanket in place at this time

## 2019-09-20 NOTE — PROVIDER CONTACT NOTE (OTHER) - BACKGROUND
54 nonverbal F from group home PMHx CP a/w AMS, increased WOB with recent seizure a/w septic shock 2/2 aspiration PNA S/P MICU for pressors, airway support.
Patient admitted for PNA, cerebral palsy, seizure disorder and AMS with SOB, etc. History of intellectual disability, cerebral palsy, seizure, hypothermia, etc.
Pt admitted w/ pneumonia. Hx of CP, receiving tube feedings
patient admitted for pneumonia
Admitting dx: MARCO
Pt admitted w/ pneumonia was receiving tube feedings via NG tube
54 nonverbal F from group home PMHx CP a/w AMS, increased WOB with recent seizure a/w septic shock 2/2 aspiration PNA S/P MICU for pressors, airway support. S/P extubation 9/3.
Patient admitted for PNA, cerebral palsy, seizure disorder and AMS with SOB, etc. History of intellectual disability, cerebral palsy, seizure, hypothermia, etc.
dx: PNA
53 yo F admit with PNA, pmh Intellectual disability, seizure disorder, constipation, CP

## 2019-09-20 NOTE — PROVIDER CONTACT NOTE (OTHER) - REASON
Patient's temp is decreased to 94. Patient has been on the hypothermia blanket since 6pm.
Pt removed NG tube
Reminder that NG was removed earlier this morning
patient pulled out NGT
Tachycardia
Patient's temp increased to 95.1, but still low. Hypothermia blanket maintained.
Pt Hypotensive
CBC Clotted
Pt hypothermic
Pt unable to give sputum sample, RN tried nasal suction.

## 2019-09-20 NOTE — PROGRESS NOTE ADULT - SUBJECTIVE AND OBJECTIVE BOX
Patient is a 54y old  Female who presents with a chief complaint of Septic Shock (19 Sep 2019 14:20)      SUBJECTIVE / OVERNIGHT EVENTS:    MEDICATIONS  (STANDING):  ALBUTerol    90 MICROgram(s) HFA Inhaler 1 Puff(s) Inhalation every 4 hours  chlorhexidine 4% Liquid 1 Application(s) Topical <User Schedule>  Clobazam 10 milliGRAM(s) Oral two times a day  enoxaparin Injectable 40 milliGRAM(s) SubCutaneous daily  lacosamide Solution 100 milliGRAM(s) Oral <User Schedule>  levETIRAcetam  Solution 1000 milliGRAM(s) Oral <User Schedule>  OLANZapine 2.5 milliGRAM(s) Oral at bedtime  valproic  acid Syrup 750 milliGRAM(s) Oral every 8 hours    MEDICATIONS  (PRN):  sodium chloride 0.9% lock flush 10 milliLiter(s) IV Push every 1 hour PRN Pre/post blood products, medications, blood draw, and to maintain line patency        CAPILLARY BLOOD GLUCOSE        I&O's Summary    19 Sep 2019 07:01  -  20 Sep 2019 07:00  --------------------------------------------------------  IN: 600 mL / OUT: 0 mL / NET: 600 mL    20 Sep 2019 07:01  -  20 Sep 2019 16:45  --------------------------------------------------------  IN: 300 mL / OUT: 0 mL / NET: 300 mL        T(C): 36.6 (09-20-19 @ 05:41), Max: 36.6 (09-20-19 @ 05:41)  HR: 80 (09-20-19 @ 05:41) (79 - 80)  BP: 122/78 (09-20-19 @ 05:41) (122/78 - 139/77)  RR: 18 (09-20-19 @ 05:41) (18 - 18)  SpO2: 100% (09-20-19 @ 05:41) (100% - 100%)    PHYSICAL EXAM:  GENERAL: NAD, well-developed  HEAD:  Atraumatic, Normocephalic  CHEST/LUNG: diminished breath sounds at the bases  HEART: Regular rate and rhythm; No murmurs, rubs, or gallops  ABDOMEN: Soft, Nontender, Nondistended; Bowel sounds present +PEG   EXTREMITIES:  contractures    LABS:    09-20    140  |  100  |  12  ----------------------------<  118<H>  4.7   |  25  |  0.66    Ca    8.9      20 Sep 2019 06:34  Mg     1.6     09-19                  RADIOLOGY & ADDITIONAL TESTS:    Imaging Personally Reviewed:    Consultant(s) Notes Reviewed:      Care Discussed with Consultants/Other Providers: Patient is a 54y old  Female who presents with a chief complaint of Septic Shock (19 Sep 2019 14:20)      SUBJECTIVE / OVERNIGHT EVENTS: Pt in NAD no acute events ON. review of systems unobtainable due to MR     MEDICATIONS  (STANDING):  ALBUTerol    90 MICROgram(s) HFA Inhaler 1 Puff(s) Inhalation every 4 hours  chlorhexidine 4% Liquid 1 Application(s) Topical <User Schedule>  Clobazam 10 milliGRAM(s) Oral two times a day  enoxaparin Injectable 40 milliGRAM(s) SubCutaneous daily  lacosamide Solution 100 milliGRAM(s) Oral <User Schedule>  levETIRAcetam  Solution 1000 milliGRAM(s) Oral <User Schedule>  OLANZapine 2.5 milliGRAM(s) Oral at bedtime  valproic  acid Syrup 750 milliGRAM(s) Oral every 8 hours    MEDICATIONS  (PRN):  sodium chloride 0.9% lock flush 10 milliLiter(s) IV Push every 1 hour PRN Pre/post blood products, medications, blood draw, and to maintain line patency        CAPILLARY BLOOD GLUCOSE        I&O's Summary    19 Sep 2019 07:01  -  20 Sep 2019 07:00  --------------------------------------------------------  IN: 600 mL / OUT: 0 mL / NET: 600 mL    20 Sep 2019 07:01  -  20 Sep 2019 16:45  --------------------------------------------------------  IN: 300 mL / OUT: 0 mL / NET: 300 mL        T(C): 36.6 (09-20-19 @ 05:41), Max: 36.6 (09-20-19 @ 05:41)  HR: 80 (09-20-19 @ 05:41) (79 - 80)  BP: 122/78 (09-20-19 @ 05:41) (122/78 - 139/77)  RR: 18 (09-20-19 @ 05:41) (18 - 18)  SpO2: 100% (09-20-19 @ 05:41) (100% - 100%)    PHYSICAL EXAM:  GENERAL: NAD, well-developed  HEAD:  Atraumatic, Normocephalic  CHEST/LUNG: diminished breath sounds at the bases  HEART: Regular rate and rhythm; No murmurs, rubs, or gallops  ABDOMEN: Soft, Nontender, Nondistended; Bowel sounds present +PEG   EXTREMITIES:  contractures    LABS:    09-20    140  |  100  |  12  ----------------------------<  118<H>  4.7   |  25  |  0.66    Ca    8.9      20 Sep 2019 06:34  Mg     1.6     09-19                  RADIOLOGY & ADDITIONAL TESTS:    Imaging Personally Reviewed:    Consultant(s) Notes Reviewed:      Care Discussed with Consultants/Other Providers:

## 2019-09-20 NOTE — PROVIDER CONTACT NOTE (OTHER) - NAME OF MD/NP/PA/DO NOTIFIED:
Sabrina NP RCU
Stephanie Shaw
Stephanie Shaw
Yadira Vail NP
Ethylyn So NP
Michaela Tello NP
Stephanie Shaw, NP
Stephanie Shaw, NP
Jim Lomeli MD ADS
Ngozi Rivera

## 2019-09-20 NOTE — PROVIDER CONTACT NOTE (OTHER) - ASSESSMENT
Patient alert, non verbal and KEEGAN orientation. Pt slightly cool to touch. Repositioned bear hugger blanket.
Patient alert, non verbal and KEEGAN orientation. Pt still slightly cool to touch. Repositioned patient and bear hugger blanket.
Pt with no signs of acute distress observed. HR elevated to 130s. All other VS stable.
pt is alert but nonverbal, don't follow command,
BP 80/56 manual   Pulse 78
Restless
Pt restless, with baseline mental status  Temp -93.1 (rectal)  HR -60  BP -105/63  RR - 18  O2 -100
Restless
patient pulled out NGT
pt is alert and nonverbal. No acute distress noted.

## 2019-09-20 NOTE — PROVIDER CONTACT NOTE (OTHER) - ACTION/TREATMENT ORDERED:
Made aware. Maintained hypothermia blanket. Patient on [id number] vitals, call back with results. Continue to monitor.
Warming blanket ordered to be placed as soon as possible. Monitor temperatures
12 lead ECG, Finger stick check. Bladder scan to evaluate if retaining. Continue to monitor.
Give Bolus over 1 hour, draw ordered labs (CBC, BMP. aPTT, PTT, INR, Lactate, ABG, Blood culture, Sputum culture, UA). Reassess BP in once bolus is completed.
RN recommended Sputum induction by respiratory.
No new order
nothing new ordered at this time
Patient's temp is increasing. Maintained hypothermia blanket. Continue to monitor.

## 2019-09-21 LAB
GRAM STN SPT: SIGNIFICANT CHANGE UP
SPECIMEN SOURCE: SIGNIFICANT CHANGE UP

## 2019-09-21 PROCEDURE — 99232 SBSQ HOSP IP/OBS MODERATE 35: CPT

## 2019-09-21 RX ADMIN — Medication 750 MILLIGRAM(S): at 23:49

## 2019-09-21 RX ADMIN — CHLORHEXIDINE GLUCONATE 1 APPLICATION(S): 213 SOLUTION TOPICAL at 07:03

## 2019-09-21 RX ADMIN — LACOSAMIDE 100 MILLIGRAM(S): 50 TABLET ORAL at 15:43

## 2019-09-21 RX ADMIN — Medication 750 MILLIGRAM(S): at 15:43

## 2019-09-21 RX ADMIN — LEVETIRACETAM 1000 MILLIGRAM(S): 250 TABLET, FILM COATED ORAL at 15:43

## 2019-09-21 RX ADMIN — LACOSAMIDE 100 MILLIGRAM(S): 50 TABLET ORAL at 23:49

## 2019-09-21 RX ADMIN — CLOBAZAM 10 MILLIGRAM(S): 10 TABLET ORAL at 07:03

## 2019-09-21 RX ADMIN — ENOXAPARIN SODIUM 40 MILLIGRAM(S): 100 INJECTION SUBCUTANEOUS at 15:43

## 2019-09-21 RX ADMIN — CLOBAZAM 10 MILLIGRAM(S): 10 TABLET ORAL at 18:22

## 2019-09-21 RX ADMIN — Medication 750 MILLIGRAM(S): at 07:04

## 2019-09-21 RX ADMIN — LACOSAMIDE 100 MILLIGRAM(S): 50 TABLET ORAL at 07:42

## 2019-09-21 RX ADMIN — LEVETIRACETAM 1000 MILLIGRAM(S): 250 TABLET, FILM COATED ORAL at 07:43

## 2019-09-21 RX ADMIN — OLANZAPINE 2.5 MILLIGRAM(S): 15 TABLET, FILM COATED ORAL at 23:49

## 2019-09-21 RX ADMIN — LEVETIRACETAM 1000 MILLIGRAM(S): 250 TABLET, FILM COATED ORAL at 23:48

## 2019-09-21 NOTE — PROGRESS NOTE ADULT - PROBLEM SELECTOR PLAN 1
- failed swallow eval   - mom agreed to PEG tube  - s/p PEG start tube feeds Jevity as per nutrition recs start @ 10 ml advance q4 hrs to goal 50 ml/hr with prosource- tolerating feeds at goal

## 2019-09-21 NOTE — PROGRESS NOTE ADULT - SUBJECTIVE AND OBJECTIVE BOX
Patient is a 54y old  Female who presents with a chief complaint of Septic Shock (20 Sep 2019 16:45)      SUBJECTIVE / OVERNIGHT EVENTS: Pt in NAD no acute events afebrile. review of systems unobtainable due to MR    MEDICATIONS  (STANDING):  ALBUTerol    90 MICROgram(s) HFA Inhaler 1 Puff(s) Inhalation every 4 hours  chlorhexidine 4% Liquid 1 Application(s) Topical <User Schedule>  Clobazam 10 milliGRAM(s) Oral two times a day  enoxaparin Injectable 40 milliGRAM(s) SubCutaneous daily  lacosamide Solution 100 milliGRAM(s) Oral <User Schedule>  levETIRAcetam  Solution 1000 milliGRAM(s) Oral <User Schedule>  OLANZapine 2.5 milliGRAM(s) Oral at bedtime  valproic  acid Syrup 750 milliGRAM(s) Oral every 8 hours    MEDICATIONS  (PRN):  sodium chloride 0.9% lock flush 10 milliLiter(s) IV Push every 1 hour PRN Pre/post blood products, medications, blood draw, and to maintain line patency        CAPILLARY BLOOD GLUCOSE        I&O's Summary    20 Sep 2019 07:01  -  21 Sep 2019 07:00  --------------------------------------------------------  IN: 900 mL / OUT: 0 mL / NET: 900 mL        T(C): 36.4 (09-21-19 @ 07:00), Max: 37.1 (09-20-19 @ 14:31)  HR: 86 (09-21-19 @ 07:00) (86 - 98)  BP: 124/79 (09-21-19 @ 07:00) (116/80 - 124/79)  RR: 16 (09-21-19 @ 07:00) (16 - 20)  SpO2: 99% (09-21-19 @ 07:00) (96% - 99%)    PHYSICAL EXAM:      LABS:    09-20    140  |  100  |  12  ----------------------------<  118<H>  4.7   |  25  |  0.66    Ca    8.9      20 Sep 2019 06:34                Culture - Respiratory with Gram Stain (collected 09-21-19 @ 00:40)  Source: SPUTUM        RADIOLOGY & ADDITIONAL TESTS:    Imaging Personally Reviewed:    Consultant(s) Notes Reviewed:      Care Discussed with Consultants/Other Providers: Patient is a 54y old  Female who presents with a chief complaint of Septic Shock (20 Sep 2019 16:45)      SUBJECTIVE / OVERNIGHT EVENTS: Pt in NAD no acute events afebrile. review of systems unobtainable due to MR    MEDICATIONS  (STANDING):  ALBUTerol    90 MICROgram(s) HFA Inhaler 1 Puff(s) Inhalation every 4 hours  chlorhexidine 4% Liquid 1 Application(s) Topical <User Schedule>  Clobazam 10 milliGRAM(s) Oral two times a day  enoxaparin Injectable 40 milliGRAM(s) SubCutaneous daily  lacosamide Solution 100 milliGRAM(s) Oral <User Schedule>  levETIRAcetam  Solution 1000 milliGRAM(s) Oral <User Schedule>  OLANZapine 2.5 milliGRAM(s) Oral at bedtime  valproic  acid Syrup 750 milliGRAM(s) Oral every 8 hours    MEDICATIONS  (PRN):  sodium chloride 0.9% lock flush 10 milliLiter(s) IV Push every 1 hour PRN Pre/post blood products, medications, blood draw, and to maintain line patency        CAPILLARY BLOOD GLUCOSE        I&O's Summary    20 Sep 2019 07:01  -  21 Sep 2019 07:00  --------------------------------------------------------  IN: 900 mL / OUT: 0 mL / NET: 900 mL        T(C): 36.4 (09-21-19 @ 07:00), Max: 37.1 (09-20-19 @ 14:31)  HR: 86 (09-21-19 @ 07:00) (86 - 98)  BP: 124/79 (09-21-19 @ 07:00) (116/80 - 124/79)  RR: 16 (09-21-19 @ 07:00) (16 - 20)  SpO2: 99% (09-21-19 @ 07:00) (96% - 99%)    PHYSICAL EXAM:  GENERAL: NAD, well-developed  HEAD:  Atraumatic, Normocephalic  CHEST/LUNG: diminished breath sounds at the bases  HEART: Regular rate and rhythm; No murmurs, rubs, or gallops  ABDOMEN: Soft, Nontender, Nondistended; Bowel sounds present +PEG   EXTREMITIES:  contractures    LABS:    09-20    140  |  100  |  12  ----------------------------<  118<H>  4.7   |  25  |  0.66    Ca    8.9      20 Sep 2019 06:34                Culture - Respiratory with Gram Stain (collected 09-21-19 @ 00:40)  Source: SPUTUM        RADIOLOGY & ADDITIONAL TESTS:    Imaging Personally Reviewed:    Consultant(s) Notes Reviewed:      Care Discussed with Consultants/Other Providers:

## 2019-09-22 LAB
ANION GAP SERPL CALC-SCNC: 10 MMO/L — SIGNIFICANT CHANGE UP (ref 7–14)
BUN SERPL-MCNC: 20 MG/DL — SIGNIFICANT CHANGE UP (ref 7–23)
C DIFF TOX GENS STL QL NAA+PROBE: SIGNIFICANT CHANGE UP
CALCIUM SERPL-MCNC: 9 MG/DL — SIGNIFICANT CHANGE UP (ref 8.4–10.5)
CHLORIDE SERPL-SCNC: 100 MMOL/L — SIGNIFICANT CHANGE UP (ref 98–107)
CO2 SERPL-SCNC: 35 MMOL/L — HIGH (ref 22–31)
CREAT SERPL-MCNC: 0.83 MG/DL — SIGNIFICANT CHANGE UP (ref 0.5–1.3)
GLUCOSE SERPL-MCNC: 112 MG/DL — HIGH (ref 70–99)
HCT VFR BLD CALC: 30.3 % — LOW (ref 34.5–45)
HGB BLD-MCNC: 9.2 G/DL — LOW (ref 11.5–15.5)
MAGNESIUM SERPL-MCNC: 1.9 MG/DL — SIGNIFICANT CHANGE UP (ref 1.6–2.6)
MCHC RBC-ENTMCNC: 30.4 % — LOW (ref 32–36)
MCHC RBC-ENTMCNC: 33 PG — SIGNIFICANT CHANGE UP (ref 27–34)
MCV RBC AUTO: 108.6 FL — HIGH (ref 80–100)
NRBC # FLD: 0.02 K/UL — SIGNIFICANT CHANGE UP (ref 0–0)
PHOSPHATE SERPL-MCNC: 4 MG/DL — SIGNIFICANT CHANGE UP (ref 2.5–4.5)
PLATELET # BLD AUTO: 147 K/UL — LOW (ref 150–400)
PMV BLD: 10.1 FL — SIGNIFICANT CHANGE UP (ref 7–13)
POTASSIUM SERPL-MCNC: 3.8 MMOL/L — SIGNIFICANT CHANGE UP (ref 3.5–5.3)
POTASSIUM SERPL-SCNC: 3.8 MMOL/L — SIGNIFICANT CHANGE UP (ref 3.5–5.3)
RBC # BLD: 2.79 M/UL — LOW (ref 3.8–5.2)
RBC # FLD: 15 % — HIGH (ref 10.3–14.5)
SODIUM SERPL-SCNC: 145 MMOL/L — SIGNIFICANT CHANGE UP (ref 135–145)
WBC # BLD: 9.68 K/UL — SIGNIFICANT CHANGE UP (ref 3.8–10.5)
WBC # FLD AUTO: 9.68 K/UL — SIGNIFICANT CHANGE UP (ref 3.8–10.5)

## 2019-09-22 PROCEDURE — 99232 SBSQ HOSP IP/OBS MODERATE 35: CPT

## 2019-09-22 RX ADMIN — LEVETIRACETAM 1000 MILLIGRAM(S): 250 TABLET, FILM COATED ORAL at 08:42

## 2019-09-22 RX ADMIN — LEVETIRACETAM 1000 MILLIGRAM(S): 250 TABLET, FILM COATED ORAL at 21:12

## 2019-09-22 RX ADMIN — Medication 750 MILLIGRAM(S): at 14:24

## 2019-09-22 RX ADMIN — LACOSAMIDE 100 MILLIGRAM(S): 50 TABLET ORAL at 06:12

## 2019-09-22 RX ADMIN — LACOSAMIDE 100 MILLIGRAM(S): 50 TABLET ORAL at 22:43

## 2019-09-22 RX ADMIN — OLANZAPINE 2.5 MILLIGRAM(S): 15 TABLET, FILM COATED ORAL at 22:43

## 2019-09-22 RX ADMIN — CHLORHEXIDINE GLUCONATE 1 APPLICATION(S): 213 SOLUTION TOPICAL at 06:13

## 2019-09-22 RX ADMIN — ENOXAPARIN SODIUM 40 MILLIGRAM(S): 100 INJECTION SUBCUTANEOUS at 14:24

## 2019-09-22 RX ADMIN — CLOBAZAM 10 MILLIGRAM(S): 10 TABLET ORAL at 17:48

## 2019-09-22 RX ADMIN — CLOBAZAM 10 MILLIGRAM(S): 10 TABLET ORAL at 06:12

## 2019-09-22 RX ADMIN — LEVETIRACETAM 1000 MILLIGRAM(S): 250 TABLET, FILM COATED ORAL at 14:24

## 2019-09-22 RX ADMIN — LACOSAMIDE 100 MILLIGRAM(S): 50 TABLET ORAL at 14:32

## 2019-09-22 RX ADMIN — Medication 750 MILLIGRAM(S): at 22:43

## 2019-09-22 RX ADMIN — Medication 750 MILLIGRAM(S): at 06:12

## 2019-09-22 NOTE — PROGRESS NOTE ADULT - NSHPATTENDINGPLANDISCUSS_GEN_ALL_CORE
MICU resident
team
resident/fellow/nurse
Plan discussed with resident/fellow/nurse.
Plan discussed with resident/fellow/nurse.
resident/fellow/nurse
ACP
resident/fellow/nurse
MICU team
resident/fellow/nurse
resident/fellow/nurse
team, nurse
MICU team
ACP
ADS NP
team
ADS NP
ACP
team, family
ACP
ADS NP
ACP

## 2019-09-22 NOTE — PROGRESS NOTE ADULT - SUBJECTIVE AND OBJECTIVE BOX
Patient is a 54y old  Female who presents with a chief complaint of Septic Shock (21 Sep 2019 10:39)      SUBJECTIVE / OVERNIGHT EVENTS: no acute events ON     MEDICATIONS  (STANDING):  ALBUTerol    90 MICROgram(s) HFA Inhaler 1 Puff(s) Inhalation every 4 hours  chlorhexidine 4% Liquid 1 Application(s) Topical <User Schedule>  Clobazam 10 milliGRAM(s) Oral two times a day  enoxaparin Injectable 40 milliGRAM(s) SubCutaneous daily  lacosamide Solution 100 milliGRAM(s) Oral <User Schedule>  levETIRAcetam  Solution 1000 milliGRAM(s) Oral <User Schedule>  OLANZapine 2.5 milliGRAM(s) Oral at bedtime  valproic  acid Syrup 750 milliGRAM(s) Oral every 8 hours    MEDICATIONS  (PRN):  sodium chloride 0.9% lock flush 10 milliLiter(s) IV Push every 1 hour PRN Pre/post blood products, medications, blood draw, and to maintain line patency        CAPILLARY BLOOD GLUCOSE        I&O's Summary    21 Sep 2019 07:01  -  22 Sep 2019 07:00  --------------------------------------------------------  IN: 600 mL / OUT: 0 mL / NET: 600 mL        T(C): 36.8 (09-22-19 @ 06:10), Max: 36.8 (09-22-19 @ 06:10)  HR: 91 (09-22-19 @ 06:10) (91 - 99)  BP: 114/70 (09-22-19 @ 06:10) (114/70 - 144/88)  RR: 18 (09-22-19 @ 06:10) (17 - 18)  SpO2: 95% (09-22-19 @ 06:10) (95% - 100%)    PHYSICAL EXAM:  GENERAL: NAD, well-developed  HEAD:  Atraumatic, Normocephalic  CHEST/LUNG: diminished breath sounds at the bases  HEART: Regular rate and rhythm; No murmurs, rubs, or gallops  ABDOMEN: Soft, Nontender, Nondistended; Bowel sounds present +PEG   EXTREMITIES:  contractures    LABS:                        9.2    9.68  )-----------( 147      ( 22 Sep 2019 05:40 )             30.3     09-22    145  |  100  |  20  ----------------------------<  112<H>  3.8   |  35<H>  |  0.83    Ca    9.0      22 Sep 2019 05:40  Phos  4.0     09-22  Mg     1.9     09-22                  RADIOLOGY & ADDITIONAL TESTS:    Imaging Personally Reviewed:    Consultant(s) Notes Reviewed:      Care Discussed with Consultants/Other Providers:

## 2019-09-23 LAB
-  AMIKACIN: SIGNIFICANT CHANGE UP
-  AZTREONAM: SIGNIFICANT CHANGE UP
-  CEFEPIME: SIGNIFICANT CHANGE UP
-  CEFTAZIDIME: SIGNIFICANT CHANGE UP
-  GENTAMICIN: SIGNIFICANT CHANGE UP
-  IMIPENEM: SIGNIFICANT CHANGE UP
-  LEVOFLOXACIN: SIGNIFICANT CHANGE UP
-  MEROPENEM: SIGNIFICANT CHANGE UP
-  PIPERACILLIN/TAZOBACTAM: SIGNIFICANT CHANGE UP
-  TOBRAMYCIN: SIGNIFICANT CHANGE UP
BACTERIA SPT RESP CULT: SIGNIFICANT CHANGE UP
GRAM STN SPT: SIGNIFICANT CHANGE UP
METHOD TYPE: SIGNIFICANT CHANGE UP
ORGANISM # SPEC MICROSCOPIC CNT: SIGNIFICANT CHANGE UP
ORGANISM # SPEC MICROSCOPIC CNT: SIGNIFICANT CHANGE UP

## 2019-09-23 PROCEDURE — 99232 SBSQ HOSP IP/OBS MODERATE 35: CPT

## 2019-09-23 RX ADMIN — Medication 750 MILLIGRAM(S): at 05:20

## 2019-09-23 RX ADMIN — LACOSAMIDE 100 MILLIGRAM(S): 50 TABLET ORAL at 23:18

## 2019-09-23 RX ADMIN — LEVETIRACETAM 1000 MILLIGRAM(S): 250 TABLET, FILM COATED ORAL at 14:16

## 2019-09-23 RX ADMIN — Medication 750 MILLIGRAM(S): at 14:16

## 2019-09-23 RX ADMIN — LEVETIRACETAM 1000 MILLIGRAM(S): 250 TABLET, FILM COATED ORAL at 07:41

## 2019-09-23 RX ADMIN — OLANZAPINE 2.5 MILLIGRAM(S): 15 TABLET, FILM COATED ORAL at 22:22

## 2019-09-23 RX ADMIN — CLOBAZAM 10 MILLIGRAM(S): 10 TABLET ORAL at 18:31

## 2019-09-23 RX ADMIN — CHLORHEXIDINE GLUCONATE 1 APPLICATION(S): 213 SOLUTION TOPICAL at 05:20

## 2019-09-23 RX ADMIN — ENOXAPARIN SODIUM 40 MILLIGRAM(S): 100 INJECTION SUBCUTANEOUS at 18:30

## 2019-09-23 RX ADMIN — Medication 750 MILLIGRAM(S): at 22:22

## 2019-09-23 RX ADMIN — CLOBAZAM 10 MILLIGRAM(S): 10 TABLET ORAL at 05:20

## 2019-09-23 RX ADMIN — LACOSAMIDE 100 MILLIGRAM(S): 50 TABLET ORAL at 06:40

## 2019-09-23 RX ADMIN — LACOSAMIDE 100 MILLIGRAM(S): 50 TABLET ORAL at 14:17

## 2019-09-23 RX ADMIN — LEVETIRACETAM 1000 MILLIGRAM(S): 250 TABLET, FILM COATED ORAL at 20:25

## 2019-09-23 NOTE — CHART NOTE - NSCHARTNOTEFT_GEN_A_CORE
Author had family meeting with SW, mother, sister, and 2 representatives from group home regarding patient's care post-hospital. All questions were answered thoroughly and both family and group home staff are in agreement with plan for rehab with transition back to group [place].    Erlin Brennan PA-C

## 2019-09-23 NOTE — PROGRESS NOTE ADULT - PROBLEM SELECTOR PROBLEM 8
Need for prophylactic measure
Need for prophylactic measure
Hypotension
Need for prophylactic measure
Hypotension
Need for prophylactic measure

## 2019-09-23 NOTE — PROGRESS NOTE ADULT - SUBJECTIVE AND OBJECTIVE BOX
Patient is a 54y old  Female who presents with a chief complaint of Septic Shock (22 Sep 2019 09:09)      SUBJECTIVE / OVERNIGHT EVENTS:      MEDICATIONS  (STANDING):  ALBUTerol    90 MICROgram(s) HFA Inhaler 1 Puff(s) Inhalation every 4 hours  chlorhexidine 4% Liquid 1 Application(s) Topical <User Schedule>  Clobazam 10 milliGRAM(s) Oral two times a day  enoxaparin Injectable 40 milliGRAM(s) SubCutaneous daily  lacosamide Solution 100 milliGRAM(s) Oral <User Schedule>  levETIRAcetam  Solution 1000 milliGRAM(s) Oral <User Schedule>  OLANZapine 2.5 milliGRAM(s) Oral at bedtime  valproic  acid Syrup 750 milliGRAM(s) Oral every 8 hours    MEDICATIONS  (PRN):  sodium chloride 0.9% lock flush 10 milliLiter(s) IV Push every 1 hour PRN Pre/post blood products, medications, blood draw, and to maintain line patency      Vital Signs Last 24 Hrs  T(C): 36.3 (23 Sep 2019 05:32), Max: 37.2 (22 Sep 2019 14:33)  T(F): 97.3 (23 Sep 2019 05:32), Max: 99 (22 Sep 2019 14:33)  HR: 91 (23 Sep 2019 05:32) (81 - 97)  BP: 124/77 (23 Sep 2019 05:32) (120/70 - 132/60)  BP(mean): --  RR: 18 (23 Sep 2019 05:32) (18 - 18)  SpO2: 96% (23 Sep 2019 05:32) (96% - 100%)  CAPILLARY BLOOD GLUCOSE        I&O's Summary    22 Sep 2019 07:01  -  23 Sep 2019 07:00  --------------------------------------------------------  IN: 1200 mL / OUT: 0 mL / NET: 1200 mL          PHYSICAL EXAM  GENERAL: NAD, well-developed  HEAD:  Atraumatic, Normocephalic  EYES: EOMI, PERRLA, conjunctiva and sclera clear  NECK: Supple, No JVD  CHEST/LUNG: Clear to auscultation bilaterally; No wheeze  HEART: Regular rate and rhythm; No murmurs, rubs, or gallops  ABDOMEN: Soft, Nontender, Nondistended; Bowel sounds present  EXTREMITIES:  2+ Peripheral Pulses, No clubbing, cyanosis, or edema  PSYCH: AAOx3  SKIN: No rashes or lesions    LABS:                        9.2    9.68  )-----------( 147      ( 22 Sep 2019 05:40 )             30.3     09-22    145  |  100  |  20  ----------------------------<  112<H>  3.8   |  35<H>  |  0.83    Ca    9.0      22 Sep 2019 05:40  Phos  4.0     09-22  Mg     1.9     09-22                RADIOLOGY & ADDITIONAL TESTS:    Imaging Personally Reviewed:  Consultant(s) Notes Reviewed:    Care Discussed with Consultants/Other Providers: Patient is a 54y old  Female who presents with a chief complaint of Septic Shock (22 Sep 2019 09:09)      SUBJECTIVE / OVERNIGHT EVENTS:  No acute issues. Meeting held with SW and pt's mother regarding dispo held today.     MEDICATIONS  (STANDING):  ALBUTerol    90 MICROgram(s) HFA Inhaler 1 Puff(s) Inhalation every 4 hours  chlorhexidine 4% Liquid 1 Application(s) Topical <User Schedule>  Clobazam 10 milliGRAM(s) Oral two times a day  enoxaparin Injectable 40 milliGRAM(s) SubCutaneous daily  lacosamide Solution 100 milliGRAM(s) Oral <User Schedule>  levETIRAcetam  Solution 1000 milliGRAM(s) Oral <User Schedule>  OLANZapine 2.5 milliGRAM(s) Oral at bedtime  valproic  acid Syrup 750 milliGRAM(s) Oral every 8 hours    MEDICATIONS  (PRN):  sodium chloride 0.9% lock flush 10 milliLiter(s) IV Push every 1 hour PRN Pre/post blood products, medications, blood draw, and to maintain line patency      Vital Signs Last 24 Hrs  T(C): 36.3 (23 Sep 2019 05:32), Max: 37.2 (22 Sep 2019 14:33)  T(F): 97.3 (23 Sep 2019 05:32), Max: 99 (22 Sep 2019 14:33)  HR: 91 (23 Sep 2019 05:32) (81 - 97)  BP: 124/77 (23 Sep 2019 05:32) (120/70 - 132/60)  BP(mean): --  RR: 18 (23 Sep 2019 05:32) (18 - 18)  SpO2: 96% (23 Sep 2019 05:32) (96% - 100%)        I&O's Summary    22 Sep 2019 07:01  -  23 Sep 2019 07:00  --------------------------------------------------------  IN: 1200 mL / OUT: 0 mL / NET: 1200 mL          PHYSICAL EXAM  GENERAL: NAD, well-developed  HEAD:  Atraumatic, Normocephalic  EYES: EOMI, PERRLA, conjunctiva and sclera clear  NECK: Supple, No JVD  CHEST/LUNG: Clear to auscultation bilaterally; No wheeze  HEART: Regular rate and rhythm; No murmurs, rubs, or gallops  ABDOMEN: Soft, Nontender, Nondistended; Bowel sounds present  EXTREMITIES:  2+ Peripheral Pulses, No clubbing, cyanosis, or edema  PSYCH: AAOx3  SKIN: No rashes or lesions      LABS:                        9.2    9.68  )-----------( 147      ( 22 Sep 2019 05:40 )             30.3     09-22    145  |  100  |  20  ----------------------------<  112<H>  3.8   |  35<H>  |  0.83    Ca    9.0      22 Sep 2019 05:40  Phos  4.0     09-22  Mg     1.9     09-22              Consultant(s) Notes Reviewed:    Care Discussed with Consultants/Other Providers:

## 2019-09-23 NOTE — PROGRESS NOTE ADULT - PROBLEM SELECTOR PLAN 8
- lovenox  40units subq daily
- lovenox  40units subq daily  Dispo: await rehab needs level II prior to discharge medically stable for discharge. Dc planning meeting monday 9/23 as per care coordination notes 1 pm
- lovenox  40units subq daily  Dispo: await rehab needs level II prior to discharge medically stable for discharge. Dc planning meeting monday 9/23 as per care coordination notes 1 pm
- lovenox  40units subq daily
- lovenox  40units subq daily
- s/p pressors in MICU  - BP stable off midodrine
- s/p pressors in MICU  - BP stable off midodrine
- lovenox  40units subq daily
- lovenox  40units subq daily  Dispo: await rehab needs level II prior to discharge medically stable for discharge. Dc planning meeting monday 9/23 as per care coordination notes 1 pm

## 2019-09-23 NOTE — PROGRESS NOTE ADULT - PROBLEM SELECTOR PLAN 3
- 2/2 likely aspiration pneumonia  - cultures negative  - completed course of zosyn   - extubated since 9/3, tolerating nasal cannula  - still requiring chest PT, suction PRN - 2/2 likely aspiration pneumonia. 9/6 sputum cx grew MRSA. 9/21 sputum cx growing pseudomonas. Completed course of zosyn 8/19-8/23 and vancomycin 8/31-9/5  - extubated since 9/3.   - chest PT, suction PRN  - defer re-initiation of abx for now. If pt febrile and/or respiratory issues, will resume abx.

## 2019-09-23 NOTE — PROGRESS NOTE ADULT - PROBLEM SELECTOR PLAN 5
- chronic hypothermia- normothermic at present  - sputum cx with MRSA, but without leukocytosis, will monitor off abx for now  - blood cultures negative Resolved. normothermic at present

## 2019-09-23 NOTE — PROGRESS NOTE ADULT - PROBLEM SELECTOR PLAN 4
- VEEG with potential epileptogenic focus in the right fronto-temporal region  - no further seizure activity noted  - changed antiepileptics to via PEG - VEEG with potential epileptogenic focus in the right fronto-temporal region  - no further seizure activity noted  - changed antiepileptics to via PEG --> continue clobazam, lacosamide, Keppra, and valproic acid

## 2019-09-23 NOTE — PROGRESS NOTE ADULT - PROBLEM SELECTOR PROBLEM 7
Cerebral palsy, unspecified type
Need for prophylactic measure
Hypotension
Hypotension
Need for prophylactic measure
Need for prophylactic measure
Cerebral palsy, unspecified type
Need for prophylactic measure
Hypotension
Need for prophylactic measure
Hypotension
Need for prophylactic measure
Need for prophylactic measure

## 2019-09-23 NOTE — CHART NOTE - NSCHARTNOTEFT_GEN_A_CORE
NUTRITION FOLLOW-UP:  Pt. unable to speak.  Observed enteral feeding of Jevity infusing at prescribed goal of 50mL/hr.  This rate adequately meets estimated nutrient needs.  Pt. reported with good tolerance to TF.  No stated/noted diarrhea/constipation or vomiting.  Weight status variable with some questionably accurate values (range of 66.9kg-86.0kg throughout admission).      Weight:  76.2kg (9/23/19)  68.8kg (9/15/19)  66.9kg (9/9/19)      Edema:  1+ generalized, 2+ B/L feet & ankles.    Skin:  No noted pressure injuries.      Pertinent Medications: MEDICATIONS  (STANDING):  ALBUTerol    90 MICROgram(s) HFA Inhaler 1 Puff(s) Inhalation every 4 hours  chlorhexidine 4% Liquid 1 Application(s) Topical <User Schedule>  Clobazam 10 milliGRAM(s) Oral two times a day  enoxaparin Injectable 40 milliGRAM(s) SubCutaneous daily  lacosamide Solution 100 milliGRAM(s) Oral <User Schedule>  levETIRAcetam  Solution 1000 milliGRAM(s) Oral <User Schedule>  OLANZapine 2.5 milliGRAM(s) Oral at bedtime  valproic  acid Syrup 750 milliGRAM(s) Oral every 8 hours    MEDICATIONS  (PRN):  sodium chloride 0.9% lock flush 10 milliLiter(s) IV Push every 1 hour PRN Pre/post blood products, medications, blood draw, and to maintain line patency    Pertinent Labs:  09-22 Na145 mmol/L Glu 112 mg/dL<H> K+ 3.8 mmol/L Cr  0.83 mg/dL BUN 20 mg/dL 09-22 Phos 4.0 mg/dL    Diet, NPO with Tube Feed:   Tube Feeding Modality: Nasogastric  Jevity 1.2 [place]ady (JEVITY1.2RTH)  Total Volume for 24 Hours (mL): 1200  Continuous  Starting Tube Feed Rate {mL per Hour}: 10  Increase Tube Feed Rate by (mL): 10     Every 4 hours  Until Goal Tube Feed Rate (mL per Hour): 50  Tube Feed Duration (in Hours): 24  Tube Feed Start Time: 08:00  No Carb Prosource (1pkg = 15gms Protein)     Qty per Day:  1 (09-16-19 @ 12:18)             [enteral feeding + Prosource provides: 1440KCals & 81g protein, daily]    PLAN/RECOMMENDATIONS:    1) Continue current enteral regimen, as tolerated.  2) Obtain weekly weights  3) RDN remains available and will f/u PRN.          Yaneli Cohen RDN, CDN pager [phone]

## 2019-09-24 PROCEDURE — 99239 HOSP IP/OBS DSCHRG MGMT >30: CPT

## 2019-09-24 RX ORDER — CLOBAZAM 10 MG/1
1 TABLET ORAL
Qty: 60 | Refills: 0
Start: 2019-09-24 | End: 2019-10-23

## 2019-09-24 RX ORDER — CLOBAZAM 10 MG/1
1 TABLET ORAL
Qty: 180 | Refills: 0
Start: 2019-09-24 | End: 2019-12-22

## 2019-09-24 RX ADMIN — LEVETIRACETAM 1000 MILLIGRAM(S): 250 TABLET, FILM COATED ORAL at 13:57

## 2019-09-24 RX ADMIN — LEVETIRACETAM 1000 MILLIGRAM(S): 250 TABLET, FILM COATED ORAL at 20:23

## 2019-09-24 RX ADMIN — CHLORHEXIDINE GLUCONATE 1 APPLICATION(S): 213 SOLUTION TOPICAL at 06:35

## 2019-09-24 RX ADMIN — CLOBAZAM 10 MILLIGRAM(S): 10 TABLET ORAL at 06:35

## 2019-09-24 RX ADMIN — LACOSAMIDE 100 MILLIGRAM(S): 50 TABLET ORAL at 22:27

## 2019-09-24 RX ADMIN — OLANZAPINE 2.5 MILLIGRAM(S): 15 TABLET, FILM COATED ORAL at 22:13

## 2019-09-24 RX ADMIN — LEVETIRACETAM 1000 MILLIGRAM(S): 250 TABLET, FILM COATED ORAL at 09:51

## 2019-09-24 RX ADMIN — Medication 750 MILLIGRAM(S): at 13:57

## 2019-09-24 RX ADMIN — LACOSAMIDE 100 MILLIGRAM(S): 50 TABLET ORAL at 14:03

## 2019-09-24 RX ADMIN — CLOBAZAM 10 MILLIGRAM(S): 10 TABLET ORAL at 17:04

## 2019-09-24 RX ADMIN — Medication 750 MILLIGRAM(S): at 06:35

## 2019-09-24 RX ADMIN — Medication 750 MILLIGRAM(S): at 22:13

## 2019-09-24 RX ADMIN — ENOXAPARIN SODIUM 40 MILLIGRAM(S): 100 INJECTION SUBCUTANEOUS at 13:56

## 2019-09-24 RX ADMIN — LACOSAMIDE 100 MILLIGRAM(S): 50 TABLET ORAL at 06:36

## 2019-09-24 NOTE — PROGRESS NOTE ADULT - PROBLEM SELECTOR PLAN 2
- 2/2 likely aspiration pneumonia. 9/6 sputum cx grew MRSA. 9/21 sputum cx growing pseudomonas. Completed course of zosyn 8/19-8/23 and vancomycin 8/31-9/5  - extubated since 9/3.   - chest PT, suction PRN  - defer re-initiation of abx for now. If pt febrile and/or respiratory issues, will resume abx.

## 2019-09-24 NOTE — PROGRESS NOTE ADULT - REASON FOR ADMISSION
Septic Shock

## 2019-09-24 NOTE — PROGRESS NOTE ADULT - PROBLEM SELECTOR PLAN 1
- failed swallow eval. S/p PEG tube placement  - c/w tube feeds Jevity as per nutrition 50 ml/hr with prosource- tolerating feeds at goal

## 2019-09-24 NOTE — PROGRESS NOTE ADULT - PROBLEM SELECTOR PROBLEM 6
Cerebral palsy, unspecified type
Cerebral palsy, unspecified type
Hypotension
Hypotension
Palliative care encounter
Need for prophylactic measure
Need for prophylactic measure
Hypotension
Cerebral palsy, unspecified type
Hypotension
Cerebral palsy, unspecified type
Cerebral palsy, unspecified type
Hypotension
Hypothermia
Cerebral palsy, unspecified type
Hypotension
Hypotension
Cerebral palsy, unspecified type
Hypothermia
Hypotension
Hypotension

## 2019-09-24 NOTE — PROGRESS NOTE ADULT - SUBJECTIVE AND OBJECTIVE BOX
Patient is a 54y old  Female who presents with a chief complaint of Septic Shock (23 Sep 2019 11:58)    SUBJECTIVE / OVERNIGHT EVENTS:  No events overnight. Patient sat'ing 96-97% on RA. Unable to obtain ROS since pt is nonverbal    MEDICATIONS  (STANDING):  ALBUTerol    90 MICROgram(s) HFA Inhaler 1 Puff(s) Inhalation every 4 hours  chlorhexidine 4% Liquid 1 Application(s) Topical <User Schedule>  Clobazam 10 milliGRAM(s) Oral two times a day  enoxaparin Injectable 40 milliGRAM(s) SubCutaneous daily  lacosamide Solution 100 milliGRAM(s) Oral <User Schedule>  levETIRAcetam  Solution 1000 milliGRAM(s) Oral <User Schedule>  OLANZapine 2.5 milliGRAM(s) Oral at bedtime  valproic  acid Syrup 750 milliGRAM(s) Oral every 8 hours    MEDICATIONS  (PRN):  sodium chloride 0.9% lock flush 10 milliLiter(s) IV Push every 1 hour PRN Pre/post blood products, medications, blood draw, and to maintain line patency      Vital Signs Last 24 Hrs  T(C): 37.2 (24 Sep 2019 14:18), Max: 37.2 (24 Sep 2019 14:18)  T(F): 98.9 (24 Sep 2019 14:18), Max: 98.9 (24 Sep 2019 14:18)  HR: 75 (24 Sep 2019 14:18) (75 - 97)  BP: 99/48 (24 Sep 2019 14:18) (99/48 - 111/63)  BP(mean): --  RR: 16 (24 Sep 2019 14:18) (16 - 17)  SpO2: 97% (24 Sep 2019 14:18) (97% - 100%)          I&O's Summary    23 Sep 2019 07:01  -  24 Sep 2019 07:00  --------------------------------------------------------  IN: 1200 mL / OUT: 0 mL / NET: 1200 mL    24 Sep 2019 07:01  -  24 Sep 2019 18:54  --------------------------------------------------------  IN: 600 mL / OUT: 0 mL / NET: 600 mL          PHYSICAL EXAM  GENERAL: NAD, non-verbal  CHEST/LUNG: Clear to auscultation bilaterally; No wheeze  HEART: Regular rate and rhythm; No murmurs, rubs, or gallops  ABDOMEN: Soft, Nontender, Nondistended; PEG tube in place.   EXTREMITIES:  2+ Peripheral Pulses, No clubbing, cyanosis, or edema. Contracted extremities  PSYCH: Alert, cooperative

## 2019-09-24 NOTE — PROGRESS NOTE ADULT - PROBLEM SELECTOR PROBLEM 3
Acute respiratory failure with hypercapnia
Acute respiratory failure with hypercapnia
Seizure
Dehydration
Hypothermia
Acute respiratory failure with hypercapnia
Hypothermia
Acute respiratory failure with hypercapnia
Acute respiratory failure with hypercapnia
Hypothermia
Hypothermia
Septic shock due to Pseudomonas species
Hypothermia
Cerebral palsy, unspecified type
Hypothermia
Dehydration
Acute respiratory failure with hypercapnia
Hypothermia
Acute respiratory failure with hypercapnia
Hypothermia
Hypothermia

## 2019-09-24 NOTE — PROGRESS NOTE ADULT - PROBLEM SELECTOR PLAN 6
- lovenox  40units subq daily  Dispo: awaiting rehab placement. SW following. level II prior to discharge medically stable for discharge.

## 2019-09-24 NOTE — PROGRESS NOTE ADULT - PROVIDER SPECIALTY LIST ADULT
Infectious Disease
MICU
Palliative Care
Pulmonology
Pulmonology
Gastroenterology
MICU
Pulmonology
Pulmonology
Anesthesia
Hospitalist
Hospitalist
MICU
Neurology
Neurology
Pulmonology
MICU
Neurology
Hospitalist
Pulmonology
Pulmonology
Hospitalist
Pulmonology
Hospitalist
Hospitalist

## 2019-09-24 NOTE — PROGRESS NOTE ADULT - ASSESSMENT
54F h/o cerebral palsy and seizure disorder from group home a/w acute AMS and increased work of breathing with recent seizure a/w septic shock 2/2 to aspiration PNA s/p MICU stay requiring pressors and airway support, intermittently requiring pressors, seizures controlled with 4 AEDs. Now extubated, off pressors and midodrine with oropharyngeal dysphagia secondary to cerebral palsy s/p PEG 9/17

## 2019-09-24 NOTE — PROGRESS NOTE ADULT - PROBLEM SELECTOR PLAN 3
- VEEG with potential epileptogenic focus in the right fronto-temporal region  - no further seizure activity noted  - changed antiepileptics to via PEG --> continue clobazam, lacosamide, Keppra, and valproic acid

## 2019-09-24 NOTE — PROGRESS NOTE ADULT - PROBLEM SELECTOR PROBLEM 4
Acute respiratory failure with hypercapnia
Cerebral palsy, unspecified type
Acute respiratory failure with hypercapnia
Seizure
Cerebral palsy, unspecified type
Seizure
Cerebral palsy, unspecified type
Seizure
Seizure
Dysphagia
Seizure
Seizure
Cerebral palsy, unspecified type
Seizure
Seizure
Cerebral palsy, unspecified type

## 2019-09-24 NOTE — PROGRESS NOTE ADULT - PROBLEM SELECTOR PROBLEM 5
Dysphagia
Seizure
Cerebral palsy, unspecified type
Seizure
Dysphagia
Hypotension
Hypothermia
Hypothermia
Hypotension
Hypothermia
Dysphagia
Dysphagia
Hypothermia
Dysphagia
Dysphagia
Hypothermia
Dysphagia

## 2019-09-24 NOTE — PROGRESS NOTE ADULT - PROBLEM SELECTOR PROBLEM 1
Acute respiratory failure with hypercapnia
Dysphagia
Functional quadriplegia
Dysphagia
Acute respiratory failure with hypercapnia
Dysphagia
Acute respiratory failure with hypercapnia
Dysphagia
Acute respiratory failure with hypercapnia
Dysphagia
Dysphagia
Acute respiratory failure with hypercapnia
Acute respiratory failure with hypercapnia

## 2019-09-25 ENCOUNTER — TRANSCRIPTION ENCOUNTER (OUTPATIENT)
Age: 54
End: 2019-09-25

## 2019-09-25 VITALS
RESPIRATION RATE: 18 BRPM | OXYGEN SATURATION: 95 % | HEART RATE: 82 BPM | SYSTOLIC BLOOD PRESSURE: 98 MMHG | DIASTOLIC BLOOD PRESSURE: 60 MMHG | TEMPERATURE: 98 F

## 2019-09-25 RX ORDER — LEVETIRACETAM 250 MG/1
10 TABLET, FILM COATED ORAL
Qty: 0 | Refills: 0 | DISCHARGE
Start: 2019-09-25

## 2019-09-25 RX ORDER — VALPROIC ACID (AS SODIUM SALT) 250 MG/5ML
750 SOLUTION, ORAL ORAL
Qty: 0 | Refills: 0 | DISCHARGE
Start: 2019-09-25

## 2019-09-25 RX ORDER — ASPIRIN/CALCIUM CARB/MAGNESIUM 324 MG
1 TABLET ORAL
Qty: 30 | Refills: 0
Start: 2019-09-25 | End: 2019-10-24

## 2019-09-25 RX ORDER — VALPROIC ACID (AS SODIUM SALT) 250 MG/5ML
15 SOLUTION, ORAL ORAL
Qty: 0 | Refills: 0 | DISCHARGE
Start: 2019-09-25

## 2019-09-25 RX ORDER — LACOSAMIDE 50 MG/1
15 TABLET ORAL
Qty: 0 | Refills: 0 | DISCHARGE
Start: 2019-09-25

## 2019-09-25 RX ORDER — ALBUTEROL 90 UG/1
1 AEROSOL, METERED ORAL
Qty: 0 | Refills: 0 | DISCHARGE
Start: 2019-09-25

## 2019-09-25 RX ORDER — OLANZAPINE 15 MG/1
1 TABLET, FILM COATED ORAL
Qty: 30 | Refills: 0
Start: 2019-09-25 | End: 2019-10-24

## 2019-09-25 RX ORDER — LACOSAMIDE 50 MG/1
10 TABLET ORAL
Qty: 0 | Refills: 0 | DISCHARGE
Start: 2019-09-25

## 2019-09-25 RX ORDER — VALPROIC ACID (AS SODIUM SALT) 250 MG/5ML
10 SOLUTION, ORAL ORAL
Qty: 0 | Refills: 0 | DISCHARGE
Start: 2019-09-25

## 2019-09-25 RX ORDER — LEVETIRACETAM 250 MG/1
7.5 TABLET, FILM COATED ORAL
Qty: 0 | Refills: 0 | DISCHARGE
Start: 2019-09-25

## 2019-09-25 RX ORDER — ASPIRIN/CALCIUM CARB/MAGNESIUM 324 MG
1 TABLET ORAL
Qty: 0 | Refills: 0 | DISCHARGE
Start: 2019-09-25 | End: 2019-10-24

## 2019-09-25 RX ORDER — OLANZAPINE 15 MG/1
1 TABLET, FILM COATED ORAL
Qty: 0 | Refills: 0 | DISCHARGE
Start: 2019-09-25 | End: 2019-10-24

## 2019-09-25 RX ADMIN — LACOSAMIDE 100 MILLIGRAM(S): 50 TABLET ORAL at 05:55

## 2019-09-25 RX ADMIN — ENOXAPARIN SODIUM 40 MILLIGRAM(S): 100 INJECTION SUBCUTANEOUS at 11:18

## 2019-09-25 RX ADMIN — CLOBAZAM 10 MILLIGRAM(S): 10 TABLET ORAL at 05:55

## 2019-09-25 RX ADMIN — CHLORHEXIDINE GLUCONATE 1 APPLICATION(S): 213 SOLUTION TOPICAL at 05:57

## 2019-09-25 RX ADMIN — LEVETIRACETAM 1000 MILLIGRAM(S): 250 TABLET, FILM COATED ORAL at 07:53

## 2019-09-25 RX ADMIN — Medication 750 MILLIGRAM(S): at 05:55

## 2019-09-25 NOTE — CHART NOTE - NSCHARTNOTESELECT_GEN_ALL_CORE
ACP/Event Note
ADS/Event Note
Event Note
Follow Up/Nutrition Services
Follow Up/Nutrition Services
Neurology/Event Note
medicine NP/Event Note
Blood Bank
Discharge note/Event Note
Event Note
FOLLOW UP/Nutrition Services
Follow Up/Nutrition Services
From MICU/Transfer Note
Nutrition Services
Nutrition Services

## 2019-09-25 NOTE — DISCHARGE NOTE NURSING/CASE MANAGEMENT/SOCIAL WORK - PATIENT PORTAL LINK FT
You can access the FollowMyHealth Patient Portal offered by [facility] by registering at the following website: [email or web address]. By joining [employer] [email or web address]’s FollowMyHealth portal, you will also be able to view your health information using other applications (apps) compatible with our system.

## 2019-09-25 NOTE — CHART NOTE - NSCHARTNOTEFT_GEN_A_CORE
Patient seen and examined earlier today. Patient clinically stable for discharge to rehab facility today.     54F h/o cerebral palsy and seizure disorder from group home a/w acute AMS and increased work of breathing with recent seizure a/w septic shock 2/2 to aspiration PNA s/p MICU stay requiring pressors and airway support, intermittently requiring pressors, seizures controlled with 4 AEDs. Now extubated, off pressors and midodrine with oropharyngeal dysphagia secondary to cerebral palsy s/p PEG 9/17   Problem/Plan - 1:  ·  Problem: Dysphagia.  Plan: - failed swallow eval. S/p PEG tube placement  - c/w tube feeds Jevity as per nutrition 50 ml/hr with prosource- tolerating feeds at goal.      Problem/Plan - 2:  ·  Problem: Acute respiratory failure with hypercapnia.  Plan: - 2/2 likely aspiration pneumonia. 9/6 sputum cx grew MRSA. 9/21 sputum cx growing pseudomonas. Completed course of zosyn 8/19-8/23 and vancomycin 8/31-9/5  - extubated since 9/3.   - chest PT, suction PRN  - defer re-initiation of abx for now. If pt febrile and/or respiratory issues, will resume abx.      Problem/Plan - 3:  ·  Problem: Seizure.  Plan: - VEEG with potential epileptogenic focus in the right fronto-temporal region  - no further seizure activity noted  - changed antiepileptics to via PEG --> continue clobazam, lacosamide, Keppra, and valproic acid.      Problem/Plan - 4:  ·  Problem: Cerebral palsy, unspecified type.  Plan: - pt is alert, nonverbal, tracks  - does not follow commands  - at baseline as per group home  - functional quadriplegia  - zyprexa 2.5 mg PEG at bedtime   - supportive care.      Problem/Plan - 5:  ·  Problem: Hypotension.  Plan: resolved.      Problem/Plan - 6:  Problem: Need for prophylactic measure. Plan: - lovenox  40units subq daily  Dispo: Spent 31 min coordinating discharge plan

## 2019-09-25 NOTE — DISCHARGE NOTE NURSING/CASE MANAGEMENT/SOCIAL WORK - NSDCFUADDAPPT_GEN_ALL_CORE_FT
**Please follow up with primary care provider within 1 - 2 weeks of discharge from rehab for further care and management.    **Please follow up with primary neurologist or the neurology clinic (Phone: [phone]) within 1 - 2 weeks of discharge from rehab for further care and management.

## 2019-10-08 ENCOUNTER — APPOINTMENT (OUTPATIENT)
Dept: NEUROLOGY | Facility: CLINIC | Age: 54
End: 2019-10-08
Payer: MEDICARE

## 2019-10-08 VITALS — SYSTOLIC BLOOD PRESSURE: 114 MMHG | HEART RATE: 73 BPM | DIASTOLIC BLOOD PRESSURE: 78 MMHG

## 2019-10-08 PROCEDURE — 99214 OFFICE O/P EST MOD 30 MIN: CPT

## 2019-10-08 NOTE — PHYSICAL EXAM
[FreeTextEntry1] : Mental Status\par will say first name in a dysarthric manner to request, otherwise no spont speech during exam\par \par Cranial Nerves\par conj gaze , but head abducted to left\par \par Motor\par contracted with w/d to pain all 4\par \par Gait\par non-ambulatory\par

## 2019-10-08 NOTE — HISTORY OF PRESENT ILLNESS
[FreeTextEntry1] : \par 53 year-old female with a history of CP, profound intellectual disability, non-verbal, non-ambulatory who was seeing Dr. Skinner, but not seen in over one year. Was recently admitted to [facility] for sepsis. No seizure reported in d/c note.However, has had bouts of focal status , R or lefft motor.  At some point AEDs were changed.\par \par According to staff member from residence who has not been with her past two seeks ( she has a gtube and is in a NH) no seizures. Maybe a bit more lethargic from baseline. \par \par No recent levels available.\par \par meds-  bid\par lev 1000 bid\par lacosamide 100 tid\par Onfi 10 bid\par

## 2019-10-08 NOTE — ASSESSMENT
[FreeTextEntry1] : A/P- 53 year-old female with a history of CP, profound intellectual disability, non-verbal, non-ambulatory  with intractable focal motor epilepsy with independent diffuse bihemispheric onsets. \par Recent hospitalization for sepsis does not mention seizures in discharge note.\par caretaker states more lethargic now. \par - cont LEV 1000mg BID, vpa 750 bid, lac 100tid, onfi 10 bid\par - needs new AED levels and ammonia\par - rtc 6-8 weeks with Dr. Bonilla\par

## 2019-10-12 LAB — ACID FAST STN SPT: SIGNIFICANT CHANGE UP

## 2019-10-24 ENCOUNTER — INPATIENT (INPATIENT)
Facility: HOSPITAL | Age: 54
LOS: 10 days | Discharge: NURSING, REHAB OR LONG-TERM CARE | End: 2019-11-04
Attending: HOSPITALIST | Admitting: HOSPITALIST
Payer: MEDICARE

## 2019-10-24 VITALS
SYSTOLIC BLOOD PRESSURE: 118 MMHG | OXYGEN SATURATION: 95 % | TEMPERATURE: 100 F | DIASTOLIC BLOOD PRESSURE: 84 MMHG | RESPIRATION RATE: 20 BRPM | HEART RATE: 129 BPM

## 2019-10-24 DIAGNOSIS — G40.909 EPILEPSY, UNSPECIFIED, NOT INTRACTABLE, WITHOUT STATUS EPILEPTICUS: ICD-10-CM

## 2019-10-24 DIAGNOSIS — N39.0 URINARY TRACT INFECTION, SITE NOT SPECIFIED: ICD-10-CM

## 2019-10-24 DIAGNOSIS — R09.02 HYPOXEMIA: ICD-10-CM

## 2019-10-24 DIAGNOSIS — A41.9 SEPSIS, UNSPECIFIED ORGANISM: ICD-10-CM

## 2019-10-24 DIAGNOSIS — Z29.9 ENCOUNTER FOR PROPHYLACTIC MEASURES, UNSPECIFIED: ICD-10-CM

## 2019-10-24 LAB
ALBUMIN SERPL ELPH-MCNC: 3 G/DL — LOW (ref 3.3–5)
ALP SERPL-CCNC: 62 U/L — SIGNIFICANT CHANGE UP (ref 40–120)
ALT FLD-CCNC: 7 U/L — SIGNIFICANT CHANGE UP (ref 4–33)
ANION GAP SERPL CALC-SCNC: 10 MMO/L — SIGNIFICANT CHANGE UP (ref 7–14)
ANISOCYTOSIS BLD QL: SLIGHT — SIGNIFICANT CHANGE UP
APPEARANCE UR: SIGNIFICANT CHANGE UP
APTT BLD: 26.2 SEC — LOW (ref 27.5–36.3)
AST SERPL-CCNC: 27 U/L — SIGNIFICANT CHANGE UP (ref 4–32)
B PERT DNA SPEC QL NAA+PROBE: NOT DETECTED — SIGNIFICANT CHANGE UP
BACTERIA # UR AUTO: SIGNIFICANT CHANGE UP
BASE EXCESS BLDV CALC-SCNC: 10.4 MMOL/L — SIGNIFICANT CHANGE UP
BASOPHILS # BLD AUTO: 0.03 K/UL — SIGNIFICANT CHANGE UP (ref 0–0.2)
BASOPHILS NFR BLD AUTO: 0.2 % — SIGNIFICANT CHANGE UP (ref 0–2)
BASOPHILS NFR SPEC: 0 % — SIGNIFICANT CHANGE UP (ref 0–2)
BILIRUB SERPL-MCNC: 0.3 MG/DL — SIGNIFICANT CHANGE UP (ref 0.2–1.2)
BILIRUB UR-MCNC: NEGATIVE — SIGNIFICANT CHANGE UP
BLASTS # FLD: 0 % — SIGNIFICANT CHANGE UP (ref 0–0)
BLOOD GAS VENOUS - CREATININE: 0.74 MG/DL — SIGNIFICANT CHANGE UP (ref 0.5–1.3)
BLOOD GAS VENOUS - FIO2: 21 — SIGNIFICANT CHANGE UP
BLOOD UR QL VISUAL: SIGNIFICANT CHANGE UP
BUN SERPL-MCNC: 31 MG/DL — HIGH (ref 7–23)
C PNEUM DNA SPEC QL NAA+PROBE: NOT DETECTED — SIGNIFICANT CHANGE UP
CALCIUM SERPL-MCNC: 8.6 MG/DL — SIGNIFICANT CHANGE UP (ref 8.4–10.5)
CHLORIDE BLDV-SCNC: 97 MMOL/L — SIGNIFICANT CHANGE UP (ref 96–108)
CHLORIDE SERPL-SCNC: 95 MMOL/L — LOW (ref 98–107)
CO2 SERPL-SCNC: 34 MMOL/L — HIGH (ref 22–31)
COLOR SPEC: YELLOW — SIGNIFICANT CHANGE UP
CREAT SERPL-MCNC: 0.87 MG/DL — SIGNIFICANT CHANGE UP (ref 0.5–1.3)
EOSINOPHIL # BLD AUTO: 0.04 K/UL — SIGNIFICANT CHANGE UP (ref 0–0.5)
EOSINOPHIL NFR BLD AUTO: 0.3 % — SIGNIFICANT CHANGE UP (ref 0–6)
EOSINOPHIL NFR FLD: 0 % — SIGNIFICANT CHANGE UP (ref 0–6)
FLUAV H1 2009 PAND RNA SPEC QL NAA+PROBE: NOT DETECTED — SIGNIFICANT CHANGE UP
FLUAV H1 RNA SPEC QL NAA+PROBE: NOT DETECTED — SIGNIFICANT CHANGE UP
FLUAV H3 RNA SPEC QL NAA+PROBE: NOT DETECTED — SIGNIFICANT CHANGE UP
FLUAV SUBTYP SPEC NAA+PROBE: NOT DETECTED — SIGNIFICANT CHANGE UP
FLUBV RNA SPEC QL NAA+PROBE: NOT DETECTED — SIGNIFICANT CHANGE UP
GAS PNL BLDV: 138 MMOL/L — SIGNIFICANT CHANGE UP (ref 136–146)
GLUCOSE BLDV-MCNC: 127 MG/DL — HIGH (ref 70–99)
GLUCOSE SERPL-MCNC: 123 MG/DL — HIGH (ref 70–99)
GLUCOSE UR-MCNC: NEGATIVE — SIGNIFICANT CHANGE UP
HADV DNA SPEC QL NAA+PROBE: NOT DETECTED — SIGNIFICANT CHANGE UP
HCO3 BLDV-SCNC: 32 MMOL/L — HIGH (ref 20–27)
HCOV PNL SPEC NAA+PROBE: SIGNIFICANT CHANGE UP
HCT VFR BLD CALC: 33.4 % — LOW (ref 34.5–45)
HCT VFR BLDV CALC: 33.3 % — LOW (ref 34.5–45)
HGB BLD-MCNC: 10.1 G/DL — LOW (ref 11.5–15.5)
HGB BLDV-MCNC: 10.8 G/DL — LOW (ref 11.5–15.5)
HMPV RNA SPEC QL NAA+PROBE: NOT DETECTED — SIGNIFICANT CHANGE UP
HPIV1 RNA SPEC QL NAA+PROBE: NOT DETECTED — SIGNIFICANT CHANGE UP
HPIV2 RNA SPEC QL NAA+PROBE: NOT DETECTED — SIGNIFICANT CHANGE UP
HPIV3 RNA SPEC QL NAA+PROBE: NOT DETECTED — SIGNIFICANT CHANGE UP
HPIV4 RNA SPEC QL NAA+PROBE: DETECTED — HIGH
IMM GRANULOCYTES NFR BLD AUTO: 1 % — SIGNIFICANT CHANGE UP (ref 0–1.5)
INR BLD: 1.09 — SIGNIFICANT CHANGE UP (ref 0.88–1.17)
KETONES UR-MCNC: NEGATIVE — SIGNIFICANT CHANGE UP
LACTATE BLDV-MCNC: 1.9 MMOL/L — SIGNIFICANT CHANGE UP (ref 0.5–2)
LACTATE BLDV-MCNC: 2.3 MMOL/L — HIGH (ref 0.5–2)
LEUKOCYTE ESTERASE UR-ACNC: SIGNIFICANT CHANGE UP
LYMPHOCYTES # BLD AUTO: 1.78 K/UL — SIGNIFICANT CHANGE UP (ref 1–3.3)
LYMPHOCYTES # BLD AUTO: 14.3 % — SIGNIFICANT CHANGE UP (ref 13–44)
LYMPHOCYTES NFR SPEC AUTO: 12.2 % — LOW (ref 13–44)
MACROCYTES BLD QL: SLIGHT — SIGNIFICANT CHANGE UP
MCHC RBC-ENTMCNC: 30.2 % — LOW (ref 32–36)
MCHC RBC-ENTMCNC: 33.8 PG — SIGNIFICANT CHANGE UP (ref 27–34)
MCV RBC AUTO: 111.7 FL — HIGH (ref 80–100)
METAMYELOCYTES # FLD: 0 % — SIGNIFICANT CHANGE UP (ref 0–1)
MONOCYTES # BLD AUTO: 2.24 K/UL — HIGH (ref 0–0.9)
MONOCYTES NFR BLD AUTO: 18 % — HIGH (ref 2–14)
MONOCYTES NFR BLD: 16.5 % — HIGH (ref 2–9)
MYELOCYTES NFR BLD: 0.9 % — HIGH (ref 0–0)
NEUTROPHIL AB SER-ACNC: 62.6 % — SIGNIFICANT CHANGE UP (ref 43–77)
NEUTROPHILS # BLD AUTO: 8.26 K/UL — HIGH (ref 1.8–7.4)
NEUTROPHILS NFR BLD AUTO: 66.2 % — SIGNIFICANT CHANGE UP (ref 43–77)
NEUTS BAND # BLD: 6.9 % — HIGH (ref 0–6)
NITRITE UR-MCNC: POSITIVE — HIGH
NRBC # FLD: 0.12 K/UL — SIGNIFICANT CHANGE UP (ref 0–0)
NRBC FLD-RTO: 1 — SIGNIFICANT CHANGE UP
OTHER - HEMATOLOGY %: 0 — SIGNIFICANT CHANGE UP
PCO2 BLDV: 74 MMHG — HIGH (ref 41–51)
PH BLDV: 7.32 PH — SIGNIFICANT CHANGE UP (ref 7.32–7.43)
PH UR: 6.5 — SIGNIFICANT CHANGE UP (ref 5–8)
PLATELET # BLD AUTO: 155 K/UL — SIGNIFICANT CHANGE UP (ref 150–400)
PLATELET COUNT - ESTIMATE: SIGNIFICANT CHANGE UP
PMV BLD: 9.1 FL — SIGNIFICANT CHANGE UP (ref 7–13)
PO2 BLDV: 41 MMHG — HIGH (ref 35–40)
POLYCHROMASIA BLD QL SMEAR: SIGNIFICANT CHANGE UP
POTASSIUM BLDV-SCNC: 3.7 MMOL/L — SIGNIFICANT CHANGE UP (ref 3.4–4.5)
POTASSIUM SERPL-MCNC: 3.8 MMOL/L — SIGNIFICANT CHANGE UP (ref 3.5–5.3)
POTASSIUM SERPL-SCNC: 3.8 MMOL/L — SIGNIFICANT CHANGE UP (ref 3.5–5.3)
PROMYELOCYTES # FLD: 0 % — SIGNIFICANT CHANGE UP (ref 0–0)
PROT SERPL-MCNC: 7.8 G/DL — SIGNIFICANT CHANGE UP (ref 6–8.3)
PROT UR-MCNC: 30 — SIGNIFICANT CHANGE UP
PROTHROM AB SERPL-ACNC: 12.1 SEC — SIGNIFICANT CHANGE UP (ref 9.8–13.1)
RBC # BLD: 2.99 M/UL — LOW (ref 3.8–5.2)
RBC # FLD: 15.9 % — HIGH (ref 10.3–14.5)
RBC CASTS # UR COMP ASSIST: SIGNIFICANT CHANGE UP (ref 0–?)
REVIEW TO FOLLOW: YES — SIGNIFICANT CHANGE UP
RSV RNA SPEC QL NAA+PROBE: NOT DETECTED — SIGNIFICANT CHANGE UP
RV+EV RNA SPEC QL NAA+PROBE: NOT DETECTED — SIGNIFICANT CHANGE UP
SAO2 % BLDV: 67.7 % — SIGNIFICANT CHANGE UP (ref 60–85)
SODIUM SERPL-SCNC: 139 MMOL/L — SIGNIFICANT CHANGE UP (ref 135–145)
SP GR SPEC: 1.02 — SIGNIFICANT CHANGE UP (ref 1–1.04)
SQUAMOUS # UR AUTO: SIGNIFICANT CHANGE UP
STOMATOCYTES BLD QL SMEAR: SLIGHT — SIGNIFICANT CHANGE UP
UROBILINOGEN FLD QL: NORMAL — SIGNIFICANT CHANGE UP
VARIANT LYMPHS # BLD: 0.9 % — SIGNIFICANT CHANGE UP
WBC # BLD: 12.47 K/UL — HIGH (ref 3.8–10.5)
WBC # FLD AUTO: 12.47 K/UL — HIGH (ref 3.8–10.5)
WBC UR QL: >50 — HIGH (ref 0–?)

## 2019-10-24 PROCEDURE — 71045 X-RAY EXAM CHEST 1 VIEW: CPT | Mod: 26

## 2019-10-24 PROCEDURE — 99223 1ST HOSP IP/OBS HIGH 75: CPT | Mod: GC,AI

## 2019-10-24 RX ORDER — LACOSAMIDE 50 MG/1
100 TABLET ORAL
Refills: 0 | Status: DISCONTINUED | OUTPATIENT
Start: 2019-10-24 | End: 2019-11-04

## 2019-10-24 RX ORDER — NYSTATIN CREAM 100000 [USP'U]/G
1 CREAM TOPICAL
Refills: 0 | Status: DISCONTINUED | OUTPATIENT
Start: 2019-10-24 | End: 2019-10-25

## 2019-10-24 RX ORDER — IPRATROPIUM/ALBUTEROL SULFATE 18-103MCG
3 AEROSOL WITH ADAPTER (GRAM) INHALATION EVERY 6 HOURS
Refills: 0 | Status: DISCONTINUED | OUTPATIENT
Start: 2019-10-24 | End: 2019-10-27

## 2019-10-24 RX ORDER — SODIUM CHLORIDE 9 MG/ML
1000 INJECTION INTRAMUSCULAR; INTRAVENOUS; SUBCUTANEOUS ONCE
Refills: 0 | Status: COMPLETED | OUTPATIENT
Start: 2019-10-24 | End: 2019-10-24

## 2019-10-24 RX ORDER — SODIUM CHLORIDE 9 MG/ML
1000 INJECTION, SOLUTION INTRAVENOUS
Refills: 0 | Status: DISCONTINUED | OUTPATIENT
Start: 2019-10-24 | End: 2019-10-25

## 2019-10-24 RX ORDER — PIPERACILLIN AND TAZOBACTAM 4; .5 G/20ML; G/20ML
3.38 INJECTION, POWDER, LYOPHILIZED, FOR SOLUTION INTRAVENOUS ONCE
Refills: 0 | Status: COMPLETED | OUTPATIENT
Start: 2019-10-24 | End: 2019-10-24

## 2019-10-24 RX ORDER — ACETAMINOPHEN 500 MG
650 TABLET ORAL EVERY 6 HOURS
Refills: 0 | Status: DISCONTINUED | OUTPATIENT
Start: 2019-10-24 | End: 2019-11-04

## 2019-10-24 RX ORDER — VANCOMYCIN HCL 1 G
VIAL (EA) INTRAVENOUS
Refills: 0 | Status: DISCONTINUED | OUTPATIENT
Start: 2019-10-24 | End: 2019-10-26

## 2019-10-24 RX ORDER — SENNA PLUS 8.6 MG/1
10 TABLET ORAL AT BEDTIME
Refills: 0 | Status: DISCONTINUED | OUTPATIENT
Start: 2019-10-24 | End: 2019-11-02

## 2019-10-24 RX ORDER — NYSTATIN CREAM 100000 [USP'U]/G
1 CREAM TOPICAL
Refills: 0 | Status: DISCONTINUED | OUTPATIENT
Start: 2019-10-24 | End: 2019-11-04

## 2019-10-24 RX ORDER — LEVETIRACETAM 250 MG/1
750 TABLET, FILM COATED ORAL EVERY 12 HOURS
Refills: 0 | Status: DISCONTINUED | OUTPATIENT
Start: 2019-10-24 | End: 2019-10-30

## 2019-10-24 RX ORDER — OLANZAPINE 15 MG/1
7.5 TABLET, FILM COATED ORAL DAILY
Refills: 0 | Status: DISCONTINUED | OUTPATIENT
Start: 2019-10-24 | End: 2019-11-04

## 2019-10-24 RX ORDER — VANCOMYCIN HCL 1 G
1000 VIAL (EA) INTRAVENOUS EVERY 12 HOURS
Refills: 0 | Status: DISCONTINUED | OUTPATIENT
Start: 2019-10-25 | End: 2019-10-26

## 2019-10-24 RX ORDER — VANCOMYCIN HCL 1 G
1000 VIAL (EA) INTRAVENOUS ONCE
Refills: 0 | Status: COMPLETED | OUTPATIENT
Start: 2019-10-24 | End: 2019-10-24

## 2019-10-24 RX ORDER — CEFTRIAXONE 500 MG/1
1000 INJECTION, POWDER, FOR SOLUTION INTRAMUSCULAR; INTRAVENOUS ONCE
Refills: 0 | Status: COMPLETED | OUTPATIENT
Start: 2019-10-24 | End: 2019-10-24

## 2019-10-24 RX ORDER — ACETAMINOPHEN 500 MG
650 TABLET ORAL EVERY 6 HOURS
Refills: 0 | Status: DISCONTINUED | OUTPATIENT
Start: 2019-10-24 | End: 2019-10-24

## 2019-10-24 RX ORDER — AZITHROMYCIN 500 MG/1
500 TABLET, FILM COATED ORAL ONCE
Refills: 0 | Status: COMPLETED | OUTPATIENT
Start: 2019-10-24 | End: 2019-10-24

## 2019-10-24 RX ORDER — PIPERACILLIN AND TAZOBACTAM 4; .5 G/20ML; G/20ML
3.38 INJECTION, POWDER, LYOPHILIZED, FOR SOLUTION INTRAVENOUS EVERY 8 HOURS
Refills: 0 | Status: DISCONTINUED | OUTPATIENT
Start: 2019-10-24 | End: 2019-10-30

## 2019-10-24 RX ORDER — ASPIRIN/CALCIUM CARB/MAGNESIUM 324 MG
81 TABLET ORAL DAILY
Refills: 0 | Status: DISCONTINUED | OUTPATIENT
Start: 2019-10-24 | End: 2019-11-04

## 2019-10-24 RX ORDER — VALPROIC ACID (AS SODIUM SALT) 250 MG/5ML
750 SOLUTION, ORAL ORAL EVERY 8 HOURS
Refills: 0 | Status: DISCONTINUED | OUTPATIENT
Start: 2019-10-24 | End: 2019-10-30

## 2019-10-24 RX ORDER — HEPARIN SODIUM 5000 [USP'U]/ML
5000 INJECTION INTRAVENOUS; SUBCUTANEOUS EVERY 8 HOURS
Refills: 0 | Status: DISCONTINUED | OUTPATIENT
Start: 2019-10-24 | End: 2019-11-04

## 2019-10-24 RX ORDER — POLYETHYLENE GLYCOL 3350 17 G/17G
17 POWDER, FOR SOLUTION ORAL DAILY
Refills: 0 | Status: DISCONTINUED | OUTPATIENT
Start: 2019-10-24 | End: 2019-11-02

## 2019-10-24 RX ORDER — BACITRACIN ZINC 500 UNIT/G
1 OINTMENT IN PACKET (EA) TOPICAL
Refills: 0 | Status: DISCONTINUED | OUTPATIENT
Start: 2019-10-24 | End: 2019-11-04

## 2019-10-24 RX ORDER — CLOBAZAM 10 MG/1
10 TABLET ORAL
Refills: 0 | Status: DISCONTINUED | OUTPATIENT
Start: 2019-10-24 | End: 2019-11-04

## 2019-10-24 RX ORDER — FOLIC ACID 0.8 MG
1 TABLET ORAL DAILY
Refills: 0 | Status: DISCONTINUED | OUTPATIENT
Start: 2019-10-24 | End: 2019-11-04

## 2019-10-24 RX ADMIN — Medication 750 MILLIGRAM(S): at 22:53

## 2019-10-24 RX ADMIN — Medication 650 MILLIGRAM(S): at 19:01

## 2019-10-24 RX ADMIN — Medication 3 MILLILITER(S): at 22:57

## 2019-10-24 RX ADMIN — AZITHROMYCIN 255 MILLIGRAM(S): 500 TABLET, FILM COATED ORAL at 16:05

## 2019-10-24 RX ADMIN — CEFTRIAXONE 1000 MILLIGRAM(S): 500 INJECTION, POWDER, FOR SOLUTION INTRAMUSCULAR; INTRAVENOUS at 16:00

## 2019-10-24 RX ADMIN — PIPERACILLIN AND TAZOBACTAM 200 GRAM(S): 4; .5 INJECTION, POWDER, LYOPHILIZED, FOR SOLUTION INTRAVENOUS at 22:51

## 2019-10-24 RX ADMIN — SODIUM CHLORIDE 75 MILLILITER(S): 9 INJECTION, SOLUTION INTRAVENOUS at 19:16

## 2019-10-24 RX ADMIN — Medication 650 MILLIGRAM(S): at 22:51

## 2019-10-24 RX ADMIN — SODIUM CHLORIDE 1000 MILLILITER(S): 9 INJECTION INTRAMUSCULAR; INTRAVENOUS; SUBCUTANEOUS at 15:32

## 2019-10-24 RX ADMIN — CEFTRIAXONE 100 MILLIGRAM(S): 500 INJECTION, POWDER, FOR SOLUTION INTRAMUSCULAR; INTRAVENOUS at 15:24

## 2019-10-24 RX ADMIN — Medication 650 MILLIGRAM(S): at 15:08

## 2019-10-24 RX ADMIN — AZITHROMYCIN 500 MILLIGRAM(S): 500 TABLET, FILM COATED ORAL at 17:05

## 2019-10-24 RX ADMIN — Medication 250 MILLIGRAM(S): at 19:16

## 2019-10-24 RX ADMIN — SODIUM CHLORIDE 1000 MILLILITER(S): 9 INJECTION INTRAMUSCULAR; INTRAVENOUS; SUBCUTANEOUS at 14:30

## 2019-10-24 RX ADMIN — HEPARIN SODIUM 5000 UNIT(S): 5000 INJECTION INTRAVENOUS; SUBCUTANEOUS at 22:53

## 2019-10-24 RX ADMIN — SENNA PLUS 10 MILLILITER(S): 8.6 TABLET ORAL at 23:18

## 2019-10-24 NOTE — ED ADULT TRIAGE NOTE - CHIEF COMPLAINT QUOTE
pt coming from [facility], c/o fever and tachycardia. as per EMS pt was hypoxic on 2l of NC, 86%. + wet cough noted in triage. pt feels warm to touch.  hx- cerebral palsy, seizure.

## 2019-10-24 NOTE — ED PROVIDER NOTE - PHYSICAL EXAMINATION
Gen: Arousable with intermittent coughing and gurgling  Head: NCAT  HEENT: EOMI, dry mucosa, normal conjunctiva  Lung: CTAB, no respiratory distress, no wheezes/rhonchi/rales B/L  CV: RRR, no murmurs, rubs or gallops  Abd: soft, NTND, no guarding, no CVA tenderness  MSK: no visible deformities  Neuro: Unable to assess  Skin: Warm, no rash  Psych: Unable to assess   ~Joseph Varghese M.D. Resident

## 2019-10-24 NOTE — H&P ADULT - HISTORY OF PRESENT ILLNESS
54yoF w/ PMHx of Cerebral palsy and seizures w/ recent hospitalization requiring MICU stay for aspiration pneumonia requiring intubation, pressors, and PEG tube, now presenting for sepsis most likely 2/2 to viral infection. Information gathered from chart since patient is baseline nonverbal and functionally quadriplegic. Patient presented with fever, tachycardic with hypoxia to 80s on 2L NC recorded at nursing home. RVP was positive for parainfluenza virus, but patient has history of Pseudomonas and MRSA.     In the ED vitals were Tmax of 101.4, HR up to 129, /54, tachypneic to 24, w/ O2 sat of 96 on 2L NC. EKG reviewed, showed sinus tach. CXR showed no acute pulmonary process. Labs were notable for leukocytosis to 12.47, lactate of 2.3, which decreased to 1.9. UA was positive for nitrites and leuk esterase and WBCs. As noted, RVP + parainfluenza virus 4.   She was given ceftriaxone and azithromycin, s/p 1L NS bolus.

## 2019-10-24 NOTE — H&P ADULT - PROBLEM SELECTOR PLAN 5
-DVT: sqh q8h  -Diet: NPO except tube feeds  -Dispo: pending clinical improvement  -GOC: Full code, MOLST in chart  -Will need to contact daughter (Marine Ponce [phone]) who may be HCP  -Will need to contact PMD Dr. Shwetha Yanez ([phone]) in AM

## 2019-10-24 NOTE — ED PROVIDER NOTE - OBJECTIVE STATEMENT
54F h/o cerebral palsy, seizure disorder, and intellectual disability from group home presents with fever and tachycardia. Patient hypoxic to 80s on 2L NC. Recently admitted in MICU for aspiration PNA requiring pressor and airway support (8/18/19-9/24/19).

## 2019-10-24 NOTE — ED ADULT NURSE NOTE - CHIEF COMPLAINT QUOTE
pt coming from [facility], c/o fever and tachycardia. as per EMS pt was hypoxic on 2l of NC, 86%. + wet cough noted in triage. pt feels warm to touch.  hx- cerebral palsy, seizure.

## 2019-10-24 NOTE — ED PROVIDER NOTE - CARE PLAN
Principal Discharge DX:	UTI (urinary tract infection) Principal Discharge DX:	Pyelonephritis, acute  Secondary Diagnosis:	Sepsis

## 2019-10-24 NOTE — H&P ADULT - ATTENDING COMMENTS
Patient seen and examined, chart and labs reviewed. Case discussed with house staff.     54F w/ cerebral palsy (non-verbal, bedbound at baseline), seizure disorder, recently admission for aspiration PNA (pseudomonas & MRSA in sputum) requiring intubation and PEG placement, p/w fever, tachycardia and hypoxia from NH. Admitted with sepsis, likely 2/2 parainfluenza,     #Sepsis - patient fever, tachycardia, w/ leukocytosis. Given recent hospitalization and sputum cx w/ pseudomonas and MRSA, will start on broad spectrum antibiotics (Vanc/Zosyn). Will follow blood and urine cultures. RVP is positive for parainfluenza, which can likely sepsis and will likely descalate antibiotics when cultures are negative. Will c/w gentle IVF hydration as patient having insensible losses w/ fever. Lacate 2.3 --> 1.9 s/p IVF.     #Hypoxia - improved w/ NC. CXR appears grossly unchanged from previous admission. May be in setting of parainfluenza. Will c/w NC as needed and attempt to wean. Dounebs ATC.     Patient is full code. Copy of MOLST in chart.

## 2019-10-24 NOTE — ED ADULT NURSE NOTE - OBJECTIVE STATEMENT
54 year old female from [facility] for fever. Pt has history of CP, anemia, PNA, g tube, MRSA, epilepsy   PIV placed by U/S guided #20 right AC labs drawn and sent as ordered will continue to monitor closely

## 2019-10-24 NOTE — H&P ADULT - PROBLEM SELECTOR PLAN 2
-was hypoxic to 80s at Nursing home  -c/w Nasal Cannula, can attempt to wean  -CXR showed no acute process  -Etiology may be parainfluenza virus  -c/w Duonebs q6h standing

## 2019-10-24 NOTE — H&P ADULT - ASSESSMENT
54F w/ cerebral palsy (non-verbal, bedbound at baseline), seizure disorder, recently admission for aspiration PNA (pseudomonas & MRSA in sputum) requiring intubation and PEG placement, p/w fever, tachycardia and hypoxia from NH. Admitted with sepsis, likely 2/2 parainfluenza,

## 2019-10-24 NOTE — H&P ADULT - PROBLEM SELECTOR PLAN 1
-pt presented to ED w/ fever, tachycardia, leukocytosis  -recent admission in MICU for aspiration PNA, w/ cx + pseudomonas and MRSA  -s/p ceftriaxone and azithro in ED  -c/w vanc/zosyn; if cultures negative for 48 hours, can d/c abx  -f/u blood and urine cultures  -RVP + parainfluenza virus  -c/w fluids  -Initially lactate of 2.3, decreased to 1.9 after 1L NS bolus

## 2019-10-24 NOTE — H&P ADULT - NSHPPHYSICALEXAM_GEN_ALL_CORE
PHYSICAL EXAM:  GENERAL: moderate distress, overweight, nonverbal  HEAD: Atraumatic, Normocephalic  EYES: conjunctiva and sclera clear  ENMT: Dry mucous membranes  NECK: Supple  NERVOUS SYSTEM: Alert & Oriented X0, Unable to assess strength  CHEST/LUNG: Bilateral wheezing appreciated in anterior lung fields  HEART: Regular rate and rhythm; S1 and S2; No murmurs, rubs, or gallops  ABDOMEN: Soft, Nontender, Nondistended: Bowel sounds present, PEG tube present  EXTREMITIES: 2+ Peripheral Pulses, + pitting edema in feet bilaterally  SKIN: Mild redness appreciated around PEG tube, not cellulitic
135

## 2019-10-24 NOTE — ED PROVIDER NOTE - ATTENDING CONTRIBUTION TO CARE
I performed a history and physical exam of the patient and discussed their management with the resident and /or advanced care provider. I reviewed the resident and /or ACP's note and agree with the documented findings and plan of care. My medical decison making and observations are found above.  Lungs rhochi on lt, abd soft no obvious tenderness.

## 2019-10-24 NOTE — ED PROVIDER NOTE - CLINICAL SUMMARY MEDICAL DECISION MAKING FREE TEXT BOX
54F h/o cerebral palsy, seizure disorder, and intellectual disability from group home presents with fever and tachycardia meeting SIRS criteria. Concern for aspiration PNA. Will treat as a code sepsis with labs, fluids, blood cultures, UA, Uculture, CXR, Abx and reassess 54F h/o cerebral palsy, seizure disorder, and intellectual disability from group home presents with fever and tachycardia meeting SIRS criteria. Concern for aspiration PNA. Will treat as a code sepsis with labs, fluids, blood cultures, UA, Uculture, CXR, Abx and reassess  Thien: pt arrives meeting sepsis criteria. recent hospitalization. will call sepsis code and get fluids and antibiotics early. will look for source for source control

## 2019-10-25 LAB
ANION GAP SERPL CALC-SCNC: 9 MMO/L — SIGNIFICANT CHANGE UP (ref 7–14)
BASE EXCESS BLDV CALC-SCNC: 9.8 MMOL/L — SIGNIFICANT CHANGE UP
BASOPHILS # BLD AUTO: 0.02 K/UL — SIGNIFICANT CHANGE UP (ref 0–0.2)
BASOPHILS NFR BLD AUTO: 0.3 % — SIGNIFICANT CHANGE UP (ref 0–2)
BLD GP AB SCN SERPL QL: NEGATIVE — SIGNIFICANT CHANGE UP
BLOOD GAS VENOUS - CREATININE: 0.72 MG/DL — SIGNIFICANT CHANGE UP (ref 0.5–1.3)
BUN SERPL-MCNC: 26 MG/DL — HIGH (ref 7–23)
CALCIUM SERPL-MCNC: 8.1 MG/DL — LOW (ref 8.4–10.5)
CHLORIDE BLDV-SCNC: 104 MMOL/L — SIGNIFICANT CHANGE UP (ref 96–108)
CHLORIDE SERPL-SCNC: 99 MMOL/L — SIGNIFICANT CHANGE UP (ref 98–107)
CO2 SERPL-SCNC: 33 MMOL/L — HIGH (ref 22–31)
CREAT SERPL-MCNC: 0.76 MG/DL — SIGNIFICANT CHANGE UP (ref 0.5–1.3)
CRP SERPL-MCNC: 63.8 MG/L — HIGH
EOSINOPHIL # BLD AUTO: 0.17 K/UL — SIGNIFICANT CHANGE UP (ref 0–0.5)
EOSINOPHIL NFR BLD AUTO: 2.2 % — SIGNIFICANT CHANGE UP (ref 0–6)
ERYTHROCYTE [SEDIMENTATION RATE] IN BLOOD: 60 MM/HR — HIGH (ref 4–25)
FERRITIN SERPL-MCNC: 222.6 NG/ML — HIGH (ref 15–150)
FOLATE SERPL-MCNC: > 20 NG/ML — HIGH (ref 4.7–20)
GAS PNL BLDV: 138 MMOL/L — SIGNIFICANT CHANGE UP (ref 136–146)
GLUCOSE BLDC GLUCOMTR-MCNC: 111 MG/DL — HIGH (ref 70–99)
GLUCOSE BLDV-MCNC: 95 MG/DL — SIGNIFICANT CHANGE UP (ref 70–99)
GLUCOSE SERPL-MCNC: 94 MG/DL — SIGNIFICANT CHANGE UP (ref 70–99)
HCO3 BLDV-SCNC: 32 MMOL/L — HIGH (ref 20–27)
HCT VFR BLD CALC: 25.2 % — LOW (ref 34.5–45)
HCT VFR BLD CALC: 31 % — LOW (ref 34.5–45)
HCT VFR BLDV CALC: 22.9 % — LOW (ref 34.5–45)
HGB BLD-MCNC: 7.5 G/DL — LOW (ref 11.5–15.5)
HGB BLD-MCNC: 9 G/DL — LOW (ref 11.5–15.5)
HGB BLDV-MCNC: 7.3 G/DL — LOW (ref 11.5–15.5)
IMM GRANULOCYTES NFR BLD AUTO: 1.3 % — SIGNIFICANT CHANGE UP (ref 0–1.5)
IRON SATN MFR SERPL: 192 UG/DL — SIGNIFICANT CHANGE UP (ref 140–530)
IRON SATN MFR SERPL: 42 UG/DL — SIGNIFICANT CHANGE UP (ref 30–160)
LACTATE BLDV-MCNC: 1.3 MMOL/L — SIGNIFICANT CHANGE UP (ref 0.5–2)
LYMPHOCYTES # BLD AUTO: 2.4 K/UL — SIGNIFICANT CHANGE UP (ref 1–3.3)
LYMPHOCYTES # BLD AUTO: 30.7 % — SIGNIFICANT CHANGE UP (ref 13–44)
MAGNESIUM SERPL-MCNC: 2.3 MG/DL — SIGNIFICANT CHANGE UP (ref 1.6–2.6)
MCHC RBC-ENTMCNC: 29 % — LOW (ref 32–36)
MCHC RBC-ENTMCNC: 29.8 % — LOW (ref 32–36)
MCHC RBC-ENTMCNC: 33.2 PG — SIGNIFICANT CHANGE UP (ref 27–34)
MCHC RBC-ENTMCNC: 33.3 PG — SIGNIFICANT CHANGE UP (ref 27–34)
MCV RBC AUTO: 112 FL — HIGH (ref 80–100)
MCV RBC AUTO: 114.4 FL — HIGH (ref 80–100)
MONOCYTES # BLD AUTO: 1.36 K/UL — HIGH (ref 0–0.9)
MONOCYTES NFR BLD AUTO: 17.4 % — HIGH (ref 2–14)
NEUTROPHILS # BLD AUTO: 3.78 K/UL — SIGNIFICANT CHANGE UP (ref 1.8–7.4)
NEUTROPHILS NFR BLD AUTO: 48.1 % — SIGNIFICANT CHANGE UP (ref 43–77)
NRBC # FLD: 0.05 K/UL — SIGNIFICANT CHANGE UP (ref 0–0)
NRBC # FLD: 0.06 K/UL — SIGNIFICANT CHANGE UP (ref 0–0)
NT-PROBNP SERPL-SCNC: 369.2 PG/ML — SIGNIFICANT CHANGE UP
PCO2 BLDV: 77 MMHG — HIGH (ref 41–51)
PH BLDV: 7.29 PH — LOW (ref 7.32–7.43)
PHOSPHATE SERPL-MCNC: 4.9 MG/DL — HIGH (ref 2.5–4.5)
PLATELET # BLD AUTO: 100 K/UL — LOW (ref 150–400)
PLATELET # BLD AUTO: 101 K/UL — LOW (ref 150–400)
PMV BLD: 9 FL — SIGNIFICANT CHANGE UP (ref 7–13)
PMV BLD: 9.2 FL — SIGNIFICANT CHANGE UP (ref 7–13)
PO2 BLDV: 40 MMHG — SIGNIFICANT CHANGE UP (ref 35–40)
POTASSIUM BLDV-SCNC: 3.8 MMOL/L — SIGNIFICANT CHANGE UP (ref 3.4–4.5)
POTASSIUM SERPL-MCNC: 3.3 MMOL/L — LOW (ref 3.5–5.3)
POTASSIUM SERPL-SCNC: 3.3 MMOL/L — LOW (ref 3.5–5.3)
RBC # BLD: 2.25 M/UL — LOW (ref 3.8–5.2)
RBC # BLD: 2.71 M/UL — LOW (ref 3.8–5.2)
RBC # FLD: 15.4 % — HIGH (ref 10.3–14.5)
RBC # FLD: 15.6 % — HIGH (ref 10.3–14.5)
RH IG SCN BLD-IMP: NEGATIVE — SIGNIFICANT CHANGE UP
SAO2 % BLDV: 65.7 % — SIGNIFICANT CHANGE UP (ref 60–85)
SODIUM SERPL-SCNC: 141 MMOL/L — SIGNIFICANT CHANGE UP (ref 135–145)
SPECIMEN SOURCE: SIGNIFICANT CHANGE UP
UIBC SERPL-MCNC: 150.1 UG/DL — SIGNIFICANT CHANGE UP (ref 110–370)
VIT B12 SERPL-MCNC: 1200 PG/ML — HIGH (ref 200–900)
WBC # BLD: 6.72 K/UL — SIGNIFICANT CHANGE UP (ref 3.8–10.5)
WBC # BLD: 7.83 K/UL — SIGNIFICANT CHANGE UP (ref 3.8–10.5)
WBC # FLD AUTO: 6.72 K/UL — SIGNIFICANT CHANGE UP (ref 3.8–10.5)
WBC # FLD AUTO: 7.83 K/UL — SIGNIFICANT CHANGE UP (ref 3.8–10.5)

## 2019-10-25 PROCEDURE — 99233 SBSQ HOSP IP/OBS HIGH 50: CPT | Mod: GC

## 2019-10-25 PROCEDURE — 73630 X-RAY EXAM OF FOOT: CPT | Mod: 26,RT

## 2019-10-25 RX ORDER — IPRATROPIUM/ALBUTEROL SULFATE 18-103MCG
3 AEROSOL WITH ADAPTER (GRAM) INHALATION ONCE
Refills: 0 | Status: COMPLETED | OUTPATIENT
Start: 2019-10-25 | End: 2019-10-25

## 2019-10-25 RX ORDER — POTASSIUM CHLORIDE 20 MEQ
40 PACKET (EA) ORAL ONCE
Refills: 0 | Status: COMPLETED | OUTPATIENT
Start: 2019-10-25 | End: 2019-10-25

## 2019-10-25 RX ORDER — SODIUM CHLORIDE 9 MG/ML
1000 INJECTION, SOLUTION INTRAVENOUS ONCE
Refills: 0 | Status: DISCONTINUED | OUTPATIENT
Start: 2019-10-25 | End: 2019-10-25

## 2019-10-25 RX ORDER — HYDROCORTISONE 20 MG
100 TABLET ORAL ONCE
Refills: 0 | Status: DISCONTINUED | OUTPATIENT
Start: 2019-10-25 | End: 2019-10-25

## 2019-10-25 RX ORDER — SODIUM CHLORIDE 9 MG/ML
1000 INJECTION, SOLUTION INTRAVENOUS ONCE
Refills: 0 | Status: COMPLETED | OUTPATIENT
Start: 2019-10-25 | End: 2019-10-25

## 2019-10-25 RX ORDER — IPRATROPIUM/ALBUTEROL SULFATE 18-103MCG
3 AEROSOL WITH ADAPTER (GRAM) INHALATION ONCE
Refills: 0 | Status: DISCONTINUED | OUTPATIENT
Start: 2019-10-25 | End: 2019-10-25

## 2019-10-25 RX ORDER — SODIUM CHLORIDE 9 MG/ML
500 INJECTION, SOLUTION INTRAVENOUS ONCE
Refills: 0 | Status: COMPLETED | OUTPATIENT
Start: 2019-10-25 | End: 2019-10-25

## 2019-10-25 RX ORDER — MIDODRINE HYDROCHLORIDE 2.5 MG/1
10 TABLET ORAL THREE TIMES A DAY
Refills: 0 | Status: DISCONTINUED | OUTPATIENT
Start: 2019-10-25 | End: 2019-10-25

## 2019-10-25 RX ADMIN — Medication 1 APPLICATION(S): at 18:00

## 2019-10-25 RX ADMIN — Medication 1 TABLET(S): at 11:51

## 2019-10-25 RX ADMIN — Medication 81 MILLIGRAM(S): at 11:51

## 2019-10-25 RX ADMIN — Medication 3 MILLILITER(S): at 10:28

## 2019-10-25 RX ADMIN — Medication 125 MILLIGRAM(S): at 15:42

## 2019-10-25 RX ADMIN — Medication 750 MILLIGRAM(S): at 16:12

## 2019-10-25 RX ADMIN — SODIUM CHLORIDE 1000 MILLILITER(S): 9 INJECTION, SOLUTION INTRAVENOUS at 15:05

## 2019-10-25 RX ADMIN — Medication 2 DROP(S): at 06:55

## 2019-10-25 RX ADMIN — LACOSAMIDE 100 MILLIGRAM(S): 50 TABLET ORAL at 18:01

## 2019-10-25 RX ADMIN — CLOBAZAM 10 MILLIGRAM(S): 10 TABLET ORAL at 06:58

## 2019-10-25 RX ADMIN — NYSTATIN CREAM 1 APPLICATION(S): 100000 CREAM TOPICAL at 17:59

## 2019-10-25 RX ADMIN — Medication 40 MILLIEQUIVALENT(S): at 10:47

## 2019-10-25 RX ADMIN — Medication 2 DROP(S): at 17:58

## 2019-10-25 RX ADMIN — LEVETIRACETAM 750 MILLIGRAM(S): 250 TABLET, FILM COATED ORAL at 17:57

## 2019-10-25 RX ADMIN — LACOSAMIDE 100 MILLIGRAM(S): 50 TABLET ORAL at 06:56

## 2019-10-25 RX ADMIN — SODIUM CHLORIDE 1000 MILLILITER(S): 9 INJECTION, SOLUTION INTRAVENOUS at 12:00

## 2019-10-25 RX ADMIN — LACOSAMIDE 100 MILLIGRAM(S): 50 TABLET ORAL at 11:50

## 2019-10-25 RX ADMIN — SODIUM CHLORIDE 2000 MILLILITER(S): 9 INJECTION, SOLUTION INTRAVENOUS at 15:42

## 2019-10-25 RX ADMIN — HEPARIN SODIUM 5000 UNIT(S): 5000 INJECTION INTRAVENOUS; SUBCUTANEOUS at 21:08

## 2019-10-25 RX ADMIN — Medication 650 MILLIGRAM(S): at 21:08

## 2019-10-25 RX ADMIN — Medication 3 MILLILITER(S): at 22:48

## 2019-10-25 RX ADMIN — CLOBAZAM 10 MILLIGRAM(S): 10 TABLET ORAL at 17:58

## 2019-10-25 RX ADMIN — PIPERACILLIN AND TAZOBACTAM 25 GRAM(S): 4; .5 INJECTION, POWDER, LYOPHILIZED, FOR SOLUTION INTRAVENOUS at 16:13

## 2019-10-25 RX ADMIN — Medication 250 MILLIGRAM(S): at 06:55

## 2019-10-25 RX ADMIN — HEPARIN SODIUM 5000 UNIT(S): 5000 INJECTION INTRAVENOUS; SUBCUTANEOUS at 06:57

## 2019-10-25 RX ADMIN — Medication 3 MILLILITER(S): at 13:04

## 2019-10-25 RX ADMIN — SENNA PLUS 10 MILLILITER(S): 8.6 TABLET ORAL at 21:08

## 2019-10-25 RX ADMIN — Medication 3 MILLILITER(S): at 16:23

## 2019-10-25 RX ADMIN — Medication 1 APPLICATION(S): at 06:59

## 2019-10-25 RX ADMIN — Medication 750 MILLIGRAM(S): at 06:58

## 2019-10-25 RX ADMIN — NYSTATIN CREAM 1 APPLICATION(S): 100000 CREAM TOPICAL at 10:47

## 2019-10-25 RX ADMIN — Medication 650 MILLIGRAM(S): at 04:13

## 2019-10-25 RX ADMIN — PIPERACILLIN AND TAZOBACTAM 25 GRAM(S): 4; .5 INJECTION, POWDER, LYOPHILIZED, FOR SOLUTION INTRAVENOUS at 08:53

## 2019-10-25 RX ADMIN — Medication 250 MILLIGRAM(S): at 21:02

## 2019-10-25 RX ADMIN — POLYETHYLENE GLYCOL 3350 17 GRAM(S): 17 POWDER, FOR SOLUTION ORAL at 11:51

## 2019-10-25 RX ADMIN — LEVETIRACETAM 750 MILLIGRAM(S): 250 TABLET, FILM COATED ORAL at 06:58

## 2019-10-25 RX ADMIN — Medication 1 MILLIGRAM(S): at 11:51

## 2019-10-25 RX ADMIN — Medication 650 MILLIGRAM(S): at 08:59

## 2019-10-25 RX ADMIN — HEPARIN SODIUM 5000 UNIT(S): 5000 INJECTION INTRAVENOUS; SUBCUTANEOUS at 14:48

## 2019-10-25 NOTE — CONSULT NOTE ADULT - SUBJECTIVE AND OBJECTIVE BOX
CHIEF COMPLAINT: Hypoxia    HPI:     55 y/o F w/ PMHx of Cerebral palsy and seizures w/ recent hospitalization requiring MICU stay for aspiration pneumonia requiring intubation, pressors, and PEG tube, presents with sepsis 2/2 to parainfluenza after an episode of desaturation to 80% on NC while at her group home. Patient non verbal and quadriplegic at baseline. Patient being treated empirically with vancomycin and zosyn given history of MRSA and pseudomonas in the sputum in the past. The patient was febrile on admission but became hypothermic to 93 today. She became hypotensive to 80s/50s and was given 2L of IV fluids. MICU was consulted for persistent hypotension.     Seen with primary team at the bedside. Receiving nebulizer treatment for wheezing on exam. Not following commands but awake and alert during examination.     PAST MEDICAL & SURGICAL HISTORY:  Intellectual disability  Constipation  Seizure disorder  Cerebral palsy  No significant past surgical history      FAMILY HISTORY:  No pertinent family history in first degree relatives      SOCIAL HISTORY:  Unable to obtain given patient's mental status     Allergies    Topamax (Unknown)    Intolerances        HOME MEDICATIONS:    REVIEW OF SYSTEMS:    [X ] Unable to assess ROS because patient non verbal     OBJECTIVE:  ICU Vital Signs Last 24 Hrs  T(C): 34.8 (25 Oct 2019 15:01), Max: 37.1 (24 Oct 2019 18:41)  T(F): 94.7 (25 Oct 2019 15:01), Max: 98.7 (24 Oct 2019 18:41)  HR: 74 (25 Oct 2019 14:41) (69 - 93)  BP: 88/51 (25 Oct 2019 14:41) (88/51 - 134/98)  BP(mean): --  ABP: --  ABP(mean): --  RR: 20 (25 Oct 2019 14:41) (19 - 23)  SpO2: 96% (25 Oct 2019 14:41) (94% - 97%)        CAPILLARY BLOOD GLUCOSE      POCT Blood Glucose.: 111 mg/dL (25 Oct 2019 11:49)      PHYSICAL EXAM:  General: awake and interactive  HEENT: EOMI  Respiratory: inspiratory and expiratory wheezes appreciated bilaterally   Cardiovascular: RRR, no murmurs appreciated   Abdomen: +BS, soft, PEG tube in place  Extremities: no peripheral edema   Skin: cool to touch   Neurological: Awake and alert, non verbal       LINES:     HOSPITAL MEDICATIONS:  MEDICATIONS  (STANDING):  acetaminophen    Suspension .. 650 milliGRAM(s) Oral every 6 hours  albuterol/ipratropium for Nebulization 3 milliLiter(s) Nebulizer every 6 hours  albuterol/ipratropium for Nebulization. 3 milliLiter(s) Nebulizer once  artificial tears (preservative free) Ophthalmic Solution 2 Drop(s) Both EYES two times a day  aspirin  chewable 81 milliGRAM(s) Oral daily  BACItracin   Ointment 1 Application(s) Topical two times a day  cloBAZam 10 milliGRAM(s) Oral two times a day  folic acid 1 milliGRAM(s) Oral daily  heparin  Injectable 5000 Unit(s) SubCutaneous every 8 hours  lacosamide Solution 100 milliGRAM(s) Oral <User Schedule>  lactated ringers Bolus 1000 milliLiter(s) IV Bolus once  levETIRAcetam  Solution 750 milliGRAM(s) Oral every 12 hours  midodrine. 10 milliGRAM(s) Oral three times a day  multivitamin 1 Tablet(s) Oral daily  nystatin Powder 1 Application(s) Topical two times a day  OLANZapine 7.5 milliGRAM(s) Oral daily  piperacillin/tazobactam IVPB.. 3.375 Gram(s) IV Intermittent every 8 hours  polyethylene glycol 3350 17 Gram(s) Oral daily  senna Syrup 10 milliLiter(s) Oral at bedtime  valproic  acid Syrup 750 milliGRAM(s) Oral every 8 hours  vancomycin  IVPB 1000 milliGRAM(s) IV Intermittent every 12 hours  vancomycin  IVPB        MEDICATIONS  (PRN):      LABS:                        9.0    6.72  )-----------( 101      ( 25 Oct 2019 14:30 )             31.0     Hgb Trend: 9.0<--, 7.5<--, 10.1<--  10    141  |  99  |  26<H>  ----------------------------<  94  3.3<L>   |  33<H>  |  0.76    Ca    8.1<L>      25 Oct 2019 05:45  Phos  4.9     10-  Mg     2.3     10-25    TPro  7.8  /  Alb  3.0<L>  /  TBili  0.3  /  DBili  x   /  AST  27  /  ALT  7   /  AlkPhos  62  10    Creatinine Trend: 0.76<--, 0.87<--  PT/INR - ( 24 Oct 2019 14:35 )   PT: 12.1 SEC;   INR: 1.09          PTT - ( 24 Oct 2019 14:35 )  PTT:26.2 SEC  Urinalysis Basic - ( 24 Oct 2019 14:00 )    Color: YELLOW / Appearance: Lt TURBID / S.016 / pH: 6.5  Gluc: NEGATIVE / Ketone: NEGATIVE  / Bili: NEGATIVE / Urobili: NORMAL   Blood: SMALL / Protein: 30 / Nitrite: POSITIVE   Leuk Esterase: LARGE / RBC: 2-5 / WBC >50   Sq Epi: FEW / Non Sq Epi: x / Bacteria: FEW        Venous Blood Gas:  10-25 @ 15:38  7.29/77/40/32/65.7  VBG Lactate: 1.3  Venous Blood Gas:  10-24 @ 16:45  --/--/--/--/--  VBG Lactate: 1.9  Venous Blood Gas:  10-24 @ 14:45  7.32/74/41/32/67.7  VBG Lactate: 2.3      MICROBIOLOGY:     RADIOLOGY:  [ ] Reviewed and interpreted by me    EKG:

## 2019-10-25 NOTE — CONSULT NOTE ADULT - ASSESSMENT
53 y/o F w/ R posterior heel wound:  - pt seen in evaluated  - hypotensive, otherwise stable, no leukocytosis  - R posterior heel deep tissue injury down to subq, ecchymotic w/ overlying blister formation, mild periwound erythema, serous drainage, no purulence, no crepitation, no malodor   - blister lanced down to the level of sub q and not beyond sub q using a sterile suture removal kit  - 0.5cc serous drainage expressed, applied Bacitracin and dresssed w/ DSD  - stable, with no acute signs of infection  - awaiting RF XR, ESR, CRP  - no acute pod sx intervention at this time  - strict offloading to posterior heels w/ Z-Flows at all times  - will cont w/ local wound care  - d/w attending
53 y/o F w/ PMHx of Cerebral palsy and seizures w/ recent hospitalization requiring MICU stay for aspiration pneumonia requiring intubation, pressors, and PEG tube, presents with sepsis 2/2 to parainfluenza after an episode of desaturation to 80% on NC while at her group home. MICU consulted for hypotension.       # Hypotension  - likely secondary to sepsis complicated by parainfluenza virus   - s/p 3L of IV fluid with improvement in BP to 110s with MAP 85  - would continue to monitor pressures carefully and if they start to drop < 90s, start midodrine 10mg q8 hours as long as she does not become bradycardic     # Parainfluenza virus  - patient maintaining oxygen saturations on NC but has diffuse wheezing on exam  - continue steroids, duonebs, and chest PT along with empiric antibiotics   - no RCU bed at this time but will try to transfer patient to RCU for more aggressive pulmonary management   - if there are any changes in patient's mental status, please obtain STAT ABG    Patient is not a MICU candidate at this time. Please re-consult as needed.    Discussed with primary team, MICU fellow, and MICU attending.    Nemo Conley MD  PGY 3 Internal Medicine

## 2019-10-25 NOTE — PROVIDER CONTACT NOTE (OTHER) - ACTION/TREATMENT ORDERED:
MD made aware. States will place order for hypothermia and will put in order for fluid bolus. Will continue to monitor

## 2019-10-25 NOTE — PROVIDER CONTACT NOTE (OTHER) - ACTION/TREATMENT ORDERED:
MD made aware and states will put in order for rectal temperature and bolus. MD to also come to bedside and assess pt. Will continue to monitor

## 2019-10-25 NOTE — PROGRESS NOTE ADULT - ATTENDING COMMENTS
Patient seen and examined, chart and labs reviewed. Case discussed with house staff.     Patient now w/ hypotension, hypothermia this afternoon likely in setting of severe sepsis. Will give IVF resuscitiation for hypotension and solumedrol IV for wheezing as did not respond to duonebs. C/w broad spectrum antibiotics (Vanc/Zosyn). Low threshold for MICU consult is patient continues to clinically deteriorate.    Patient is full code. Copy of MOLST in chart.

## 2019-10-25 NOTE — PROGRESS NOTE ADULT - SUBJECTIVE AND OBJECTIVE BOX
Maritza Wells, PGY-1  Pager: [phone]    CHIEF COMPLAINT: Patient is a 54y old  Female who presents with a chief complaint of Sepsis (24 Oct 2019 18:50)      INTERVAL HPI/OVERNIGHT EVENTS: No complaints overnight. Patient seen and examined this AM. More alert than yesterday however, patient still nonverbal. Still on NC.     MEDICATIONS (STANDING):  acetaminophen    Suspension .. 650 milliGRAM(s) Oral every 6 hours  albuterol/ipratropium for Nebulization 3 milliLiter(s) Nebulizer every 6 hours  artificial tears (preservative free) Ophthalmic Solution 2 Drop(s) Both EYES two times a day  aspirin  chewable 81 milliGRAM(s) Oral daily  BACItracin   Ointment 1 Application(s) Topical two times a day  cloBAZam 10 milliGRAM(s) Oral two times a day  folic acid 1 milliGRAM(s) Oral daily  heparin  Injectable 5000 Unit(s) SubCutaneous every 8 hours  lacosamide Solution 100 milliGRAM(s) Oral <User Schedule>  lactated ringers. 1000 milliLiter(s) IV Continuous <Continuous>  levETIRAcetam  Solution 750 milliGRAM(s) Oral every 12 hours  multivitamin 1 Tablet(s) Oral daily  nystatin Ointment 1 Application(s) Topical two times a day  nystatin Powder 1 Application(s) Topical two times a day  OLANZapine 7.5 milliGRAM(s) Oral daily  piperacillin/tazobactam IVPB.. 3.375 Gram(s) IV Intermittent every 8 hours  polyethylene glycol 3350 17 Gram(s) Oral daily  senna Syrup 10 milliLiter(s) Oral at bedtime  valproic  acid Syrup 750 milliGRAM(s) Oral every 8 hours  vancomycin  IVPB 1000 milliGRAM(s) IV Intermittent every 12 hours  vancomycin  IVPB        MEDICATIONS  (PRN):    T(F): 97.9 (10-25-19 @ 06:54), Max: 101.4 (10-24-19 @ 13:30)  HR: 69 (10-25-19 @ 06:54) (69 - 129)  BP: 134/98 (10-25-19 @ 06:54) (100/55 - 134/98)  RR: 20 (10-25-19 @ 06:54) (20 - 24)  SpO2: 95% (10-25-19 @ 06:54) (94% - 98%)  Wt(kg): --  CAPILLARY BLOOD GLUCOSE        I&O's Summary      PHYSICAL EXAM:  GENERAL: no acute distress, overweight, nonverbal  HEAD: Atraumatic, Normocephalic  EYES: conjunctiva and sclera clear  ENMT: Dry mucous membranes  NECK: Supple  NERVOUS SYSTEM: Alert & Oriented X0, Unable to assess strength  CHEST/LUNG: Unable to appreciate breath sounds d/t pt habitus and referred bowel sounds, however, pt breathing without any apparent distress  HEART: Regular rate and rhythm; S1 and S2; No murmurs, rubs, or gallops  ABDOMEN: Soft, Nontender, Nondistended: Bowel sounds present, PEG tube present  EXTREMITIES: 2+ Peripheral Pulses    LABS:                        7.5    7.83  )-----------( 100      ( 25 Oct 2019 05:45 )             25.2     10-25    141  |  99  |  26<H>  ----------------------------<  94  3.3<L>   |  33<H>  |  0.76    Ca    8.1<L>      25 Oct 2019 05:45  Phos  4.9     10-  Mg     2.3     10-    TPro  7.8  /  Alb  3.0<L>  /  TBili  0.3  /  DBili  x   /  AST  27  /  ALT  7   /  AlkPhos  62  10-24    PT/INR - ( 24 Oct 2019 14:35 )   PT: 12.1 SEC;   INR: 1.09          PTT - ( 24 Oct 2019 14:35 )  PTT:26.2 SEC  Urinalysis Basic - ( 24 Oct 2019 14:00 )    Color: YELLOW / Appearance: Lt TURBID / S.016 / pH: 6.5  Gluc: NEGATIVE / Ketone: NEGATIVE  / Bili: NEGATIVE / Urobili: NORMAL   Blood: SMALL / Protein: 30 / Nitrite: POSITIVE   Leuk Esterase: LARGE / RBC: 2-5 / WBC >50   Sq Epi: FEW / Non Sq Epi: x / Bacteria: FEW          RADIOLOGY & ADDITIONAL TESTS:

## 2019-10-25 NOTE — CHART NOTE - NSCHARTNOTEFT_GEN_A_CORE
55 y/o F w/ PMHx of Cerebral palsy and seizures w/ recent hospitalization requiring MICU stay for aspiration pneumonia requiring intubation, pressors, and PEG tube, presents with sepsis 2/2 to parainfluenza after an episode of desaturation to 80% on NC while at her group home.     Patient persistently hypothermic with rectal temps around 93 despite having bear hugger on her. Additionally, patient was more somnolent than the AM. Patient became hypotensive to the 80s/50s, and given 2L LR to which patient responded. bedside point-of-care U/S was performed, showed no B-lines bilaterally. IV solumedrol 125mg given since patient had wheezing bilaterally despite recent duoneb treatment. MICU was called to assess patient given persistent hypothermia, hypotension, and relative bradycardia despite patient being on antibiotics with MRSA and pseudomonas coverage given patient history. Patient not a MICU candidate at this time. Patient maintained BPs after receiving fluid with BPs in the 130s/100s, holding tube feeds until the AM due to aspiration risk. If patient becomes hypotensive, she can be given 500 cc bolus, if she does not respond, midodrine 10 q8h can be given and MICU re-consulted. Throughout these events, patient O2 sat remained in the 90s and HR stayed in the normal range.       Maritza Wells, PGY-1  Internal Medicine

## 2019-10-25 NOTE — CONSULT NOTE ADULT - ATTENDING COMMENTS
Patient with history as above now with severe sepsis likely due to parainfluenza and proteus UTI.  Hypotension responded to fluid boluses.  Would consider midodrine if hypotensive again.  Does not need pressors at this time.  Would c/w steroids and nebulizers atc, on abx per primary team.  Needs chest PT for secretion clearance.    Patient does not require MICU level of care at this time.  Please re-consult as needed.

## 2019-10-25 NOTE — CONSULT NOTE ADULT - SUBJECTIVE AND OBJECTIVE BOX
Attending:    Patient is a 54y old  Female who presents with a chief complaint of Sepsis (25 Oct 2019 09:39)      HPI:  54yoF w/ PMHx of Cerebral palsy and seizures w/ recent hospitalization requiring MICU stay for aspiration pneumonia requiring intubation, pressors, and PEG tube, now presenting for sepsis most likely 2/2 to viral infection. Information gathered from chart since patient is baseline nonverbal and functionally quadriplegic. Patient presented with fever, tachycardic with hypoxia to 80s on 2L NC recorded at nursing home. RVP was positive for parainfluenza virus, but patient has history of Pseudomonas and MRSA.     In the ED vitals were Tmax of 101.4, HR up to 129, /54, tachypneic to 24, w/ O2 sat of 96 on 2L NC. EKG reviewed, showed sinus tach. CXR showed no acute pulmonary process. Labs were notable for leukocytosis to 12.47, lactate of 2.3, which decreased to 1.9. UA was positive for nitrites and leuk esterase and WBCs. As noted, RVP + parainfluenza virus 4.   She was given ceftriaxone and azithromycin, s/p 1L NS bolus. (24 Oct 2019 18:50)    Review of systems negative except per HPI    PAST MEDICAL & SURGICAL HISTORY:  Intellectual disability  Constipation  Seizure disorder  Cerebral palsy  No significant past surgical history    Home Medications:  albuterol 2.5 mg/3 mL (0.083%) inhalation solution: 3 milliliter(s) inhaled every 4 hours (24 Oct 2019 19:02)  Artificial Tears ophthalmic solution: 1 drop(s) in each eye 2 times a day (24 Oct 2019 19:02)  aspirin 81 mg oral tablet, chewable: 1 tab(s) by gastrostomy tube once a day (24 Oct 2019 19:02)  B-Complex 50 oral tablet: 1 tab(s) by gastrostomy tube once a day (24 Oct 2019 19:02)  folic acid 1 mg oral tablet: 1 tab(s) by gastrostomy tube once a day (24 Oct 2019 19:02)  lacosamide 10 mg/mL oral solution: 10 milliliter(s) by gastrostomy tube every 8 hours (24 Oct 2019 19:02)  levETIRAcetam 100 mg/mL oral solution: 7.5 milliliter(s) by gastrostomy tube 2 times a day (24 Oct 2019 19:02)  MiraLax oral powder for reconstitution: 17 gram(s) by gastrostomy tube once a day (24 Oct 2019 19:02)  Multiple Vitamins oral liquid: 15 milliliter(s) by gastrostomy tube once a day (24 Oct 2019 19:02)  nystatin 100,000 units/g topical cream: Apply to perineal groin &amp; buttock topically every shift for fungal (24 Oct 2019 19:02)  OLANZapine 7.5 mg oral tablet: 1 tab(s) by gastrostomy tube once a day (24 Oct 2019 19:02)  Senna 8.8 mg/5 mL oral syrup: 10 milliliter(s) by gastrostomy tube once a day (at bedtime) (24 Oct 2019 19:02)  valproic acid 250 mg/5 mL oral liquid: 15 milliliter(s) by gastrostomy tube every 8 hours (24 Oct 2019 19:02)    Allergies    Topamax (Unknown)    Intolerances      FAMILY HISTORY:  No pertinent family history in first degree relatives    Social History:       LABS                        9.0    6.72  )-----------( 101      ( 25 Oct 2019 14:30 )             31.0     10-25    141  |  99  |  26<H>  ----------------------------<  94  3.3<L>   |  33<H>  |  0.76    Ca    8.1<L>      25 Oct 2019 05:45  Phos  4.9     10-25  Mg     2.3     10-25    TPro  7.8  /  Alb  3.0<L>  /  TBili  0.3  /  DBili  x   /  AST  27  /  ALT  7   /  AlkPhos  62  10-24    PT/INR - ( 24 Oct 2019 14:35 )   PT: 12.1 SEC;   INR: 1.09          PTT - ( 24 Oct 2019 14:35 )  PTT:26.2 SEC    Vital Signs Last 24 Hrs  T(C): 34.8 (25 Oct 2019 15:01), Max: 37.1 (24 Oct 2019 18:41)  T(F): 94.7 (25 Oct 2019 15:01), Max: 98.7 (24 Oct 2019 18:41)  HR: 74 (25 Oct 2019 14:41) (69 - 93)  BP: 88/51 (25 Oct 2019 14:41) (88/51 - 134/98)  BP(mean): --  RR: 20 (25 Oct 2019 14:41) (19 - 23)  SpO2: 96% (25 Oct 2019 14:41) (94% - 97%)    PHYSICAL EXAM  General: NAD, AA0x3    Lower Extremity Focused:  Vasc: DP/PT 2/4, TG warm to cool, CFT < 3 seconds   Neuro: Epicritic sensation intact to digits b/l  MSK: quadriplegic   Derm:  R posterior heel deep tissue injury down to subq, ecchymotic w/ overlying blister formation, mild periwound erythema, serous drainage, no purulence, no crepitation, no malodor         RADIOLOGY    awaiting RF XR

## 2019-10-25 NOTE — PROVIDER CONTACT NOTE (OTHER) - ASSESSMENT
Temperature 93.4 and BP 88/51. Pt with bear hugger blanappears to be drowsy Temperature 93.4 and BP 88/51. Pt with bear hugger blanket and appears to be drowsy

## 2019-10-26 ENCOUNTER — TRANSCRIPTION ENCOUNTER (OUTPATIENT)
Age: 54
End: 2019-10-26

## 2019-10-26 DIAGNOSIS — T68.XXXA HYPOTHERMIA, INITIAL ENCOUNTER: ICD-10-CM

## 2019-10-26 DIAGNOSIS — I95.9 HYPOTENSION, UNSPECIFIED: ICD-10-CM

## 2019-10-26 LAB
-  AMIKACIN: SIGNIFICANT CHANGE UP
-  AMPICILLIN/SULBACTAM: SIGNIFICANT CHANGE UP
-  AMPICILLIN: SIGNIFICANT CHANGE UP
-  AZTREONAM: SIGNIFICANT CHANGE UP
-  CEFAZOLIN: SIGNIFICANT CHANGE UP
-  CEFEPIME: SIGNIFICANT CHANGE UP
-  CEFOXITIN: SIGNIFICANT CHANGE UP
-  CEFTAZIDIME: SIGNIFICANT CHANGE UP
-  CEFTRIAXONE: SIGNIFICANT CHANGE UP
-  ERTAPENEM: SIGNIFICANT CHANGE UP
-  GENTAMICIN: SIGNIFICANT CHANGE UP
-  LEVOFLOXACIN: SIGNIFICANT CHANGE UP
-  MEROPENEM: SIGNIFICANT CHANGE UP
-  NITROFURANTOIN: SIGNIFICANT CHANGE UP
-  PIPERACILLIN/TAZOBACTAM: SIGNIFICANT CHANGE UP
-  TOBRAMYCIN: SIGNIFICANT CHANGE UP
-  TRIMETHOPRIM/SULFAMETHOXAZOLE: SIGNIFICANT CHANGE UP
ANION GAP SERPL CALC-SCNC: 10 MMO/L — SIGNIFICANT CHANGE UP (ref 7–14)
BACTERIA UR CULT: SIGNIFICANT CHANGE UP
BASE EXCESS BLDV CALC-SCNC: 7.6 MMOL/L — SIGNIFICANT CHANGE UP
BASOPHILS # BLD AUTO: 0.02 K/UL — SIGNIFICANT CHANGE UP (ref 0–0.2)
BASOPHILS NFR BLD AUTO: 0.4 % — SIGNIFICANT CHANGE UP (ref 0–2)
BUN SERPL-MCNC: 21 MG/DL — SIGNIFICANT CHANGE UP (ref 7–23)
CALCIUM SERPL-MCNC: 8.1 MG/DL — LOW (ref 8.4–10.5)
CHLORIDE SERPL-SCNC: 100 MMOL/L — SIGNIFICANT CHANGE UP (ref 98–107)
CO2 SERPL-SCNC: 31 MMOL/L — SIGNIFICANT CHANGE UP (ref 22–31)
CREAT SERPL-MCNC: 0.72 MG/DL — SIGNIFICANT CHANGE UP (ref 0.5–1.3)
CRP SERPL-MCNC: 49 MG/L — HIGH
EOSINOPHIL # BLD AUTO: 0 K/UL — SIGNIFICANT CHANGE UP (ref 0–0.5)
EOSINOPHIL NFR BLD AUTO: 0 % — SIGNIFICANT CHANGE UP (ref 0–6)
ERYTHROCYTE [SEDIMENTATION RATE] IN BLOOD: 64 MM/HR — HIGH (ref 4–25)
GLUCOSE SERPL-MCNC: 107 MG/DL — HIGH (ref 70–99)
HCO3 BLDV-SCNC: 31 MMOL/L — HIGH (ref 20–27)
HCT VFR BLD CALC: 29.1 % — LOW (ref 34.5–45)
HGB BLD-MCNC: 8.5 G/DL — LOW (ref 11.5–15.5)
IMM GRANULOCYTES NFR BLD AUTO: 1.2 % — SIGNIFICANT CHANGE UP (ref 0–1.5)
LYMPHOCYTES # BLD AUTO: 1.24 K/UL — SIGNIFICANT CHANGE UP (ref 1–3.3)
LYMPHOCYTES # BLD AUTO: 25.1 % — SIGNIFICANT CHANGE UP (ref 13–44)
MAGNESIUM SERPL-MCNC: 2.3 MG/DL — SIGNIFICANT CHANGE UP (ref 1.6–2.6)
MCHC RBC-ENTMCNC: 29.2 % — LOW (ref 32–36)
MCHC RBC-ENTMCNC: 33.1 PG — SIGNIFICANT CHANGE UP (ref 27–34)
MCV RBC AUTO: 113.2 FL — HIGH (ref 80–100)
METHOD TYPE: SIGNIFICANT CHANGE UP
MONOCYTES # BLD AUTO: 0.22 K/UL — SIGNIFICANT CHANGE UP (ref 0–0.9)
MONOCYTES NFR BLD AUTO: 4.4 % — SIGNIFICANT CHANGE UP (ref 2–14)
NEUTROPHILS # BLD AUTO: 3.41 K/UL — SIGNIFICANT CHANGE UP (ref 1.8–7.4)
NEUTROPHILS NFR BLD AUTO: 68.9 % — SIGNIFICANT CHANGE UP (ref 43–77)
NRBC # FLD: 0.06 K/UL — SIGNIFICANT CHANGE UP (ref 0–0)
NRBC FLD-RTO: 1.2 — SIGNIFICANT CHANGE UP
ORGANISM # SPEC MICROSCOPIC CNT: SIGNIFICANT CHANGE UP
ORGANISM # SPEC MICROSCOPIC CNT: SIGNIFICANT CHANGE UP
PCO2 BLDV: 65 MMHG — HIGH (ref 41–51)
PH BLDV: 7.33 PH — SIGNIFICANT CHANGE UP (ref 7.32–7.43)
PHOSPHATE SERPL-MCNC: 3.8 MG/DL — SIGNIFICANT CHANGE UP (ref 2.5–4.5)
PLATELET # BLD AUTO: 122 K/UL — LOW (ref 150–400)
PMV BLD: 9.5 FL — SIGNIFICANT CHANGE UP (ref 7–13)
PO2 BLDV: 116 MMHG — HIGH (ref 35–40)
POTASSIUM SERPL-MCNC: 4.9 MMOL/L — SIGNIFICANT CHANGE UP (ref 3.5–5.3)
POTASSIUM SERPL-SCNC: 4.9 MMOL/L — SIGNIFICANT CHANGE UP (ref 3.5–5.3)
RBC # BLD: 2.57 M/UL — LOW (ref 3.8–5.2)
RBC # FLD: 15.3 % — HIGH (ref 10.3–14.5)
SAO2 % BLDV: 98.9 % — HIGH (ref 60–85)
SODIUM SERPL-SCNC: 141 MMOL/L — SIGNIFICANT CHANGE UP (ref 135–145)
VANCOMYCIN TROUGH SERPL-MCNC: 30.9 UG/ML — CRITICAL HIGH (ref 10–20)
WBC # BLD: 4.95 K/UL — SIGNIFICANT CHANGE UP (ref 3.8–10.5)
WBC # FLD AUTO: 4.95 K/UL — SIGNIFICANT CHANGE UP (ref 3.8–10.5)

## 2019-10-26 PROCEDURE — 99233 SBSQ HOSP IP/OBS HIGH 50: CPT | Mod: GC

## 2019-10-26 RX ADMIN — OLANZAPINE 7.5 MILLIGRAM(S): 15 TABLET, FILM COATED ORAL at 13:53

## 2019-10-26 RX ADMIN — HEPARIN SODIUM 5000 UNIT(S): 5000 INJECTION INTRAVENOUS; SUBCUTANEOUS at 05:22

## 2019-10-26 RX ADMIN — PIPERACILLIN AND TAZOBACTAM 25 GRAM(S): 4; .5 INJECTION, POWDER, LYOPHILIZED, FOR SOLUTION INTRAVENOUS at 09:13

## 2019-10-26 RX ADMIN — Medication 750 MILLIGRAM(S): at 17:18

## 2019-10-26 RX ADMIN — Medication 1 APPLICATION(S): at 05:23

## 2019-10-26 RX ADMIN — Medication 40 MILLIGRAM(S): at 05:22

## 2019-10-26 RX ADMIN — CLOBAZAM 10 MILLIGRAM(S): 10 TABLET ORAL at 05:22

## 2019-10-26 RX ADMIN — Medication 3 MILLILITER(S): at 15:41

## 2019-10-26 RX ADMIN — PIPERACILLIN AND TAZOBACTAM 25 GRAM(S): 4; .5 INJECTION, POWDER, LYOPHILIZED, FOR SOLUTION INTRAVENOUS at 00:02

## 2019-10-26 RX ADMIN — Medication 650 MILLIGRAM(S): at 09:14

## 2019-10-26 RX ADMIN — PIPERACILLIN AND TAZOBACTAM 25 GRAM(S): 4; .5 INJECTION, POWDER, LYOPHILIZED, FOR SOLUTION INTRAVENOUS at 17:18

## 2019-10-26 RX ADMIN — Medication 650 MILLIGRAM(S): at 17:18

## 2019-10-26 RX ADMIN — NYSTATIN CREAM 1 APPLICATION(S): 100000 CREAM TOPICAL at 05:22

## 2019-10-26 RX ADMIN — Medication 2 DROP(S): at 05:22

## 2019-10-26 RX ADMIN — Medication 3 MILLILITER(S): at 09:59

## 2019-10-26 RX ADMIN — Medication 750 MILLIGRAM(S): at 00:03

## 2019-10-26 RX ADMIN — CLOBAZAM 10 MILLIGRAM(S): 10 TABLET ORAL at 19:05

## 2019-10-26 RX ADMIN — LACOSAMIDE 100 MILLIGRAM(S): 50 TABLET ORAL at 19:05

## 2019-10-26 RX ADMIN — LEVETIRACETAM 750 MILLIGRAM(S): 250 TABLET, FILM COATED ORAL at 19:05

## 2019-10-26 RX ADMIN — Medication 2 DROP(S): at 17:19

## 2019-10-26 RX ADMIN — HEPARIN SODIUM 5000 UNIT(S): 5000 INJECTION INTRAVENOUS; SUBCUTANEOUS at 13:54

## 2019-10-26 RX ADMIN — LEVETIRACETAM 750 MILLIGRAM(S): 250 TABLET, FILM COATED ORAL at 06:30

## 2019-10-26 RX ADMIN — HEPARIN SODIUM 5000 UNIT(S): 5000 INJECTION INTRAVENOUS; SUBCUTANEOUS at 21:46

## 2019-10-26 RX ADMIN — Medication 650 MILLIGRAM(S): at 21:46

## 2019-10-26 RX ADMIN — Medication 81 MILLIGRAM(S): at 13:52

## 2019-10-26 RX ADMIN — Medication 1 TABLET(S): at 13:53

## 2019-10-26 RX ADMIN — NYSTATIN CREAM 1 APPLICATION(S): 100000 CREAM TOPICAL at 17:20

## 2019-10-26 RX ADMIN — Medication 650 MILLIGRAM(S): at 02:09

## 2019-10-26 RX ADMIN — LACOSAMIDE 100 MILLIGRAM(S): 50 TABLET ORAL at 13:51

## 2019-10-26 RX ADMIN — Medication 3 MILLILITER(S): at 22:50

## 2019-10-26 RX ADMIN — Medication 1 APPLICATION(S): at 17:21

## 2019-10-26 RX ADMIN — LACOSAMIDE 100 MILLIGRAM(S): 50 TABLET ORAL at 05:21

## 2019-10-26 RX ADMIN — Medication 750 MILLIGRAM(S): at 09:14

## 2019-10-26 RX ADMIN — Medication 1 MILLIGRAM(S): at 13:51

## 2019-10-26 RX ADMIN — Medication 3 MILLILITER(S): at 04:49

## 2019-10-26 NOTE — PROGRESS NOTE ADULT - SUBJECTIVE AND OBJECTIVE BOX
Patient is a 54y old  Female who presents with a chief complaint of Sepsis (25 Oct 2019 16:58)       INTERVAL HPI/OVERNIGHT EVENTS:  Patient seen and evaluated at bedside.  Pt is resting comfortable in NAD. Denies N/V/F/C.      Allergies    Topamax (Unknown)    Intolerances        Vital Signs Last 24 Hrs  T(C): 37.9 (26 Oct 2019 01:22), Max: 37.9 (26 Oct 2019 01:22)  T(F): 100.3 (26 Oct 2019 01:22), Max: 100.3 (26 Oct 2019 01:22)  HR: 84 (26 Oct 2019 05:14) (74 - 97)  BP: 103/60 (26 Oct 2019 05:14) (88/51 - 137/79)  BP(mean): 87 (25 Oct 2019 17:10) (87 - 87)  RR: 16 (26 Oct 2019 05:14) (16 - 20)  SpO2: 97% (26 Oct 2019 05:14) (94% - 98%)    LABS:                        8.5    4.95  )-----------( 122      ( 26 Oct 2019 06:45 )             29.1     10-26    141  |  100  |  21  ----------------------------<  107<H>  4.9   |  31  |  0.72    Ca    8.1<L>      26 Oct 2019 06:45  Phos  3.8     10-  Mg     2.3     10-26    TPro  7.8  /  Alb  3.0<L>  /  TBili  0.3  /  DBili  x   /  AST  27  /  ALT  7   /  AlkPhos  62  10-24    PT/INR - ( 24 Oct 2019 14:35 )   PT: 12.1 SEC;   INR: 1.09          PTT - ( 24 Oct 2019 14:35 )  PTT:26.2 SEC  Urinalysis Basic - ( 24 Oct 2019 14:00 )    Color: YELLOW / Appearance: Lt TURBID / S.016 / pH: 6.5  Gluc: NEGATIVE / Ketone: NEGATIVE  / Bili: NEGATIVE / Urobili: NORMAL   Blood: SMALL / Protein: 30 / Nitrite: POSITIVE   Leuk Esterase: LARGE / RBC: 2-5 / WBC >50   Sq Epi: FEW / Non Sq Epi: x / Bacteria: FEW      CAPILLARY BLOOD GLUCOSE      POCT Blood Glucose.: 111 mg/dL (25 Oct 2019 11:49)      Lower Extremity Physical Exam:  Vasc: DP/PT 2/4, TG warm to cool, CFT < 3 seconds   Neuro: Epicritic sensation intact to digits b/l  MSK: quadriplegic and contracted LE  Derm:  R posterior heel deep tissue injury down to subq, ecchymotic, mild periwound erythema, scant serous drainage, no purulence, no crepitation, no malodor    RADIOLOGY & ADDITIONAL TESTS:    < from: Xray Foot AP + Lateral + Oblique, Right (10.25.19 @ 18:14) >    EXAM:  RAD FOOT MIN 3 VIEWS RIGHT        PROCEDURE DATE:  Oct 25 2019         INTERPRETATION:  CLINICAL INDICATION: right plantar heel deep tissue   injury    EXAM:  Single oblique view of the right foot from 10/25/2019 at 1814.    Additional viewscould not be obtained due to patient contracted status.    IMPRESSION:  Intact appearing distal end of fibular fracture fixation plate with   cortical and syndesmotic screws. Underlying anatomic alignment grossly   maintained.    Generalized soft tissue swelling. No tracking gas collections or gross   radiographic evidence for osteomyelitis particularly in heel region.    Chronic mild irregular spurring along medial malleolar margin. No   dislocations or acute appearing fractures.    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 Preserved remaining joint spaces.    Generalized osteopenia otherwise no discrete lytic or blastic lesions.                  DEAN WALLACE M.D., ATTENDING RADIOLOGIST    < end of copied text >

## 2019-10-26 NOTE — CHART NOTE - NSCHARTNOTEFT_GEN_A_CORE
Pt seen and examined at the bedside. Physical exam notable for T- , diaphoresis, b/l wheezing, responsive to voice. Pt seen and examined at the bedside. Physical exam notable for T-100.3 (rectal), HR-97, BP-114/53, RR-16, SpO2- 94%, diaphoresis, chills, generalized flushing, b/l wheezing, decreased inspiratory effort, responsive to voice. Turned off bear hugger.

## 2019-10-26 NOTE — PROGRESS NOTE ADULT - ATTENDING COMMENTS
Patient seen and examined, chart and labs reviewed. Case discussed with house staff.   Dw Family - mother and sister at bedside  Pt awake, speaks 1-2 words, as per mother, mental status improving compared to previous   Cough- unable to expectorate    - continue guadalupe Gonzalez   - Pt started on IV  steroids given  wheezing, hypotension  and  hypothermia- continue for now  -continue Nebs/Respiratory toilet  - continue PEG feeds  - Continue to closely monitor

## 2019-10-26 NOTE — PROGRESS NOTE ADULT - PROBLEM SELECTOR PLAN 5
-c/w home meds  -Seizure and aspiration precautions -Per family, patient has history of hypothermia and has been noted on previous admissions  -Patient was hypothermic on 10/25, however has been able to maintain adequate temperatures since receiving stress dose of steroids and having bear hugger.

## 2019-10-26 NOTE — PROGRESS NOTE ADULT - PROBLEM SELECTOR PLAN 1
-pt presented to ED w/ fever, tachycardia, leukocytosis  -recent admission in MICU for aspiration PNA, w/ cx + pseudomonas and MRSA  -s/p ceftriaxone and azithro in ED  -c/w vanc/zosyn (holding Vanc for today since Vanc level is 30.9), will check random vanc tomorrow AM and redose at that time.   -blood cx NGTD and urine cultures +proteus  -RVP + parainfluenza virus  -c/w fluids  -Initially lactate of 2.3, decreased to 1.9 after 1L NS bolus

## 2019-10-26 NOTE — PROGRESS NOTE ADULT - PROBLEM SELECTOR PLAN 6
-DVT: sqh q8h  -Diet: NPO except tube feeds  -Dispo: pending clinical improvement  -GOC: Full code, MOLST in chart  -Mother (Marine Ponce [phone]) who may be HCP contact and updated  -PMD Dr. Shwetha Yanez ([phone]) -c/w home meds  -Seizure and aspiration precautions

## 2019-10-26 NOTE — DISCHARGE NOTE PROVIDER - NSDCFUSCHEDAPPT_GEN_ALL_CORE_FT
NAYANA JACQUES ; 11/08/2019 ; NPP Neuro [address] [facility] NAYANA JACQUES ; 11/08/2019 ; NPP Neuro [address] [facility] NAYANA JACQUES ; 11/08/2019 ; NPP Neuro [address] [facility] NAYANA JACQUES ; 11/08/2019 ; NPP Neuro [address] [facility] NAYANA JACQUES ; 11/08/2019 ; NPP Neuro [address] [facility] NAYANA JACQUES ; 11/08/2019 ; NPP Neuro [address] [facility] NAYANA JACQUES ; 11/08/2019 ; NPP Neuro [facility]

## 2019-10-26 NOTE — PROGRESS NOTE ADULT - SUBJECTIVE AND OBJECTIVE BOX
Maritza Wells, PGY-1  Pager: [phone]    CHIEF COMPLAINT: Patient is a 54y old  Female who presents with a chief complaint of Sepsis (26 Oct 2019 09:14)      INTERVAL HPI/OVERNIGHT EVENTS: No acute events overnight. Patient temp increased to 100.3. However, this AM rectal temp was 97.6. Patient seen and examined this AM. Alert, nonverbal, at baseline. Patient in no acute distress.     MEDICATIONS (STANDING):  acetaminophen    Suspension .. 650 milliGRAM(s) Oral every 6 hours  albuterol/ipratropium for Nebulization 3 milliLiter(s) Nebulizer every 6 hours  artificial tears (preservative free) Ophthalmic Solution 2 Drop(s) Both EYES two times a day  aspirin  chewable 81 milliGRAM(s) Oral daily  BACItracin   Ointment 1 Application(s) Topical two times a day  cloBAZam 10 milliGRAM(s) Oral two times a day  folic acid 1 milliGRAM(s) Oral daily  heparin  Injectable 5000 Unit(s) SubCutaneous every 8 hours  lacosamide Solution 100 milliGRAM(s) Oral <User Schedule>  levETIRAcetam  Solution 750 milliGRAM(s) Oral every 12 hours  methylPREDNISolone sodium succinate Injectable 40 milliGRAM(s) IV Push daily  multivitamin 1 Tablet(s) Oral daily  nystatin Powder 1 Application(s) Topical two times a day  OLANZapine 7.5 milliGRAM(s) Oral daily  piperacillin/tazobactam IVPB.. 3.375 Gram(s) IV Intermittent every 8 hours  polyethylene glycol 3350 17 Gram(s) Oral daily  senna Syrup 10 milliLiter(s) Oral at bedtime  valproic  acid Syrup 750 milliGRAM(s) Oral every 8 hours    MEDICATIONS  (PRN):    T(F): 97.6 (10-26-19 @ 09:10), Max: 100.3 (10-26-19 @ 01:22)  HR: 73 (10-26-19 @ 09:10) (73 - 97)  BP: 116/42 (10-26-19 @ 09:10) (88/51 - 137/79)  RR: 17 (10-26-19 @ 09:10) (16 - 20)  SpO2: 100% (10-26-19 @ 09:10) (94% - 100%)  Wt(kg): --  CAPILLARY BLOOD GLUCOSE      POCT Blood Glucose.: 111 mg/dL (25 Oct 2019 11:49)    I&O's Summary      PHYSICAL EXAM:  GENERAL: no acute distress, overweight, nonverbal, on nasal cannula  HEAD: Atraumatic, Normocephalic  EYES: conjunctiva and sclera clear  ENMT: Dry mucous membranes  NECK: Supple  NERVOUS SYSTEM: Alert & Oriented X0, Unable to assess strength  CHEST/LUNG: faint bilateral wheezing in anterior lung fields. pt breathing without any apparent distress  HEART: Regular rate and rhythm; S1 and S2; No murmurs, rubs, or gallops  ABDOMEN: Soft, Nontender, Nondistended: Bowel sounds present, PEG tube present  EXTREMITIES: 2+ Peripheral Pulses    LABS:                        8.5    4.95  )-----------( 122      ( 26 Oct 2019 06:45 )             29.1     10-26    141  |  100  |  21  ----------------------------<  107<H>  4.9   |  31  |  0.72    Ca    8.1<L>      26 Oct 2019 06:45  Phos  3.8     10-26  Mg     2.3     10-26    TPro  7.8  /  Alb  3.0<L>  /  TBili  0.3  /  DBili  x   /  AST  27  /  ALT  7   /  AlkPhos  62  10-24    PT/INR - ( 24 Oct 2019 14:35 )   PT: 12.1 SEC;   INR: 1.09          PTT - ( 24 Oct 2019 14:35 )  PTT:26.2 SEC  Urinalysis Basic - ( 24 Oct 2019 14:00 )    Color: YELLOW / Appearance: Lt TURBID / S.016 / pH: 6.5  Gluc: NEGATIVE / Ketone: NEGATIVE  / Bili: NEGATIVE / Urobili: NORMAL   Blood: SMALL / Protein: 30 / Nitrite: POSITIVE   Leuk Esterase: LARGE / RBC: 2-5 / WBC >50   Sq Epi: FEW / Non Sq Epi: x / Bacteria: FEW          RADIOLOGY & ADDITIONAL TESTS: Maritza Wells, PGY-1  Pager: [phone]    CHIEF COMPLAINT: Patient is a 54y old  Female who presents with a chief complaint of Sepsis (26 Oct 2019 09:14)      INTERVAL HPI/OVERNIGHT EVENTS: No acute events overnight. Patient temp increased to 100.3. However, this AM rectal temp was 97.6. Patient seen and examined this AM. Alert, nonverbal, at baseline. Patient in no acute distress. Per attending conversation with family, patient has history of hypothermia.     MEDICATIONS (STANDING):  acetaminophen    Suspension .. 650 milliGRAM(s) Oral every 6 hours  albuterol/ipratropium for Nebulization 3 milliLiter(s) Nebulizer every 6 hours  artificial tears (preservative free) Ophthalmic Solution 2 Drop(s) Both EYES two times a day  aspirin  chewable 81 milliGRAM(s) Oral daily  BACItracin   Ointment 1 Application(s) Topical two times a day  cloBAZam 10 milliGRAM(s) Oral two times a day  folic acid 1 milliGRAM(s) Oral daily  heparin  Injectable 5000 Unit(s) SubCutaneous every 8 hours  lacosamide Solution 100 milliGRAM(s) Oral <User Schedule>  levETIRAcetam  Solution 750 milliGRAM(s) Oral every 12 hours  methylPREDNISolone sodium succinate Injectable 40 milliGRAM(s) IV Push daily  multivitamin 1 Tablet(s) Oral daily  nystatin Powder 1 Application(s) Topical two times a day  OLANZapine 7.5 milliGRAM(s) Oral daily  piperacillin/tazobactam IVPB.. 3.375 Gram(s) IV Intermittent every 8 hours  polyethylene glycol 3350 17 Gram(s) Oral daily  senna Syrup 10 milliLiter(s) Oral at bedtime  valproic  acid Syrup 750 milliGRAM(s) Oral every 8 hours    MEDICATIONS  (PRN):    T(F): 97.6 (10-26-19 @ 09:10), Max: 100.3 (10-26-19 @ 01:22)  HR: 73 (10-26-19 @ 09:10) (73 - 97)  BP: 116/42 (10-26-19 @ 09:10) (88/51 - 137/79)  RR: 17 (10-26-19 @ 09:10) (16 - 20)  SpO2: 100% (10-26-19 @ 09:10) (94% - 100%)  Wt(kg): --  CAPILLARY BLOOD GLUCOSE      POCT Blood Glucose.: 111 mg/dL (25 Oct 2019 11:49)    I&O's Summary      PHYSICAL EXAM:  GENERAL: no acute distress, overweight, nonverbal, on nasal cannula  HEAD: Atraumatic, Normocephalic  EYES: conjunctiva and sclera clear  ENMT: Dry mucous membranes  NECK: Supple  NERVOUS SYSTEM: Alert & Oriented X0, Unable to assess strength  CHEST/LUNG: faint bilateral wheezing in anterior lung fields. pt breathing without any apparent distress  HEART: Regular rate and rhythm; S1 and S2; No murmurs, rubs, or gallops  ABDOMEN: Soft, Nontender, Nondistended: Bowel sounds present, PEG tube present  EXTREMITIES: 2+ Peripheral Pulses    LABS:                        8.5    4.95  )-----------( 122      ( 26 Oct 2019 06:45 )             29.1     10-26    141  |  100  |  21  ----------------------------<  107<H>  4.9   |  31  |  0.72    Ca    8.1<L>      26 Oct 2019 06:45  Phos  3.8     10-26  Mg     2.3     10-26    TPro  7.8  /  Alb  3.0<L>  /  TBili  0.3  /  DBili  x   /  AST  27  /  ALT  7   /  AlkPhos  62  10-24    PT/INR - ( 24 Oct 2019 14:35 )   PT: 12.1 SEC;   INR: 1.09          PTT - ( 24 Oct 2019 14:35 )  PTT:26.2 SEC  Urinalysis Basic - ( 24 Oct 2019 14:00 )    Color: YELLOW / Appearance: Lt TURBID / S.016 / pH: 6.5  Gluc: NEGATIVE / Ketone: NEGATIVE  / Bili: NEGATIVE / Urobili: NORMAL   Blood: SMALL / Protein: 30 / Nitrite: POSITIVE   Leuk Esterase: LARGE / RBC: 2-5 / WBC >50   Sq Epi: FEW / Non Sq Epi: x / Bacteria: FEW          RADIOLOGY & ADDITIONAL TESTS:

## 2019-10-26 NOTE — DISCHARGE NOTE PROVIDER - HOSPITAL COURSE
54yoF w/ PMHx of Cerebral palsy and seizures w/ recent hospitalization requiring MICU stay for aspiration pneumonia requiring intubation, pressors, and PEG tube, now presenting for sepsis most likely 2/2 to viral infection. Patient presented with fever, tachycardic with hypoxia to 80s on 2L NC recorded at nursing home. Patient was febrile, tachycardic, tachypneic, in the ED w/ noted leukocytosis, and an initially elevated lactate. Patient was placed on nasal cannula and given duonebs around the clock. RVP was positive for parainfluenza virus, but patient has history of Pseudomonas and MRSA so she was started on vanc/zosyn. Urine cultures grew Proteus sensitive to zosyn. Hospitalization complicated by episode of hypothermia with rectal temps around 93 despite warming blanket. Patient additionally was hypotensive, though responded to 2L NS. POCUS was performed which did not show evidence of fluid in the lungs, though patient still had significant wheezing despite duoneb treatment and patient was given stress dose of solumedrol w/ subsequent 5 day course. 54yoF w/ PMHx of Cerebral palsy and seizures w/ recent hospitalization requiring MICU stay for aspiration pneumonia requiring intubation, pressors, and PEG tube, now presenting for sepsis most likely 2/2 to viral infection. Patient presented with fever, tachycardic with hypoxia to 80s on 2L NC recorded at nursing home. Patient was febrile, tachycardic, tachypneic, in the ED w/ noted leukocytosis, and an initially elevated lactate. Patient was placed on nasal cannula and given duonebs around the clock. RVP was positive for parainfluenza virus, but patient has history of Pseudomonas and MRSA so she was started on vanc/zosyn. Urine cultures grew Proteus sensitive to zosyn. Hospitalization complicated by episode of hypothermia with rectal temps around 93 despite warming blanket. Patient additionally was hypotensive, though responded to 2L NS. POCUS was performed which did not show evidence of fluid in the lungs, though patient still had significant wheezing despite duoneb treatment and patient was given stress dose of solumedrol w/ subsequent 5 day course. Patient was subsequently stabilized and her temperature remained above 97 degrees without use of jose hugger. She continued to have rhonchi in all lung fields, with shallow breaths, and had oxygen saturation in the low 90s on room air, but high 90s-100% on 2L NC. The patient was subsequently weaned. There was a concern that perhaps she was lethargic and not taking deep breaths secondary to 4 anti-epileptic drugs, and therefore we staggered the timing and she was much more awake and alert. She completed her 7 day course of vancomycin and zosyn and appears to be mo 54yoF w/ PMHx of Cerebral palsy and seizures w/ recent hospitalization requiring MICU stay for aspiration pneumonia requiring intubation, pressors, and PEG tube, now presenting for sepsis most likely 2/2 to viral infection. Patient presented with fever, tachycardic with hypoxia to 80s on 2L NC recorded at nursing home. Patient was febrile, tachycardic, tachypneic, in the ED w/ noted leukocytosis, and an initially elevated lactate. Patient was placed on nasal cannula and given duonebs around the clock. RVP was positive for parainfluenza virus, but patient has history of Pseudomonas and MRSA so she was started on vanc/zosyn. Urine cultures grew Proteus sensitive to zosyn. Hospitalization complicated by episode of hypothermia with rectal temps around 93 despite warming blanket. Patient additionally was hypotensive, though responded to 2L NS. POCUS was performed which did not show evidence of fluid in the lungs, though patient still had significant wheezing despite duoneb treatment and patient was given stress dose of solumedrol w/ subsequent 5 day course. Patient was subsequently stabilized and her temperature remained above 97 degrees without use of jose hugger. She continued to have rhonchi in all lung fields, with shallow breaths, and had oxygen saturation in the low 90s on room air, but high 90s-100% on 2L NC. The patient was subsequently weaned. There was a concern that perhaps she was lethargic and not taking deep breaths secondary to 4 anti-epileptic drugs, and therefore we staggered the timing and she was much more awake and alert. She completed her 7 day course of vancomycin and zosyn and appears to be more awake and alert. 54yoF w/ PMHx of Cerebral palsy and seizures w/ recent hospitalization requiring MICU stay for aspiration pneumonia requiring intubation, pressors, and PEG tube, now presenting for sepsis most likely 2/2 to viral infection. Information gathered from chart since patient is baseline nonverbal and functionally quadriplegic. Patient presented with fever, tachycardic with hypoxia to 80s on 2L NC recorded at nursing home. RVP was positive for parainfluenza virus, but patient has history of Pseudomonas and MRSA.         In the ED vitals were Tmax of 101.4, HR up to 129, /54, tachypneic to 24, w/ O2 sat of 96 on 2L NC. EKG reviewed, showed sinus tach. CXR showed no acute pulmonary process. Labs were notable for leukocytosis to 12.47, lactate of 2.3, which decreased to 1.9. UA was positive for nitrites and leuk esterase and WBCs. As noted, RVP + parainfluenza virus 4.     She was given ceftriaxone and azithromycin, s/p 1L NS bolus.          Patient was placed on nasal cannula and given duonebs around the clock. RVP was positive for parainfluenza virus, but patient has history of Pseudomonas and MRSA so she was started on vanc/zosyn. Urine cultures grew Proteus sensitive to zosyn. Hospitalization complicated by episode of hypothermia with rectal temps around 93 despite warming blanket. Patient additionally was hypotensive, though responded to 2L NS. POCUS was performed which did not show evidence of fluid in the lungs, though patient still had significant wheezing despite duoneb treatment and patient was given stress dose of solumedrol w/ subsequent 5 day course. Patient was subsequently stabilized and her temperature remained above 97 degrees without use of jose hugger. She continued to have rhonchi in all lung fields, with shallow breaths, and had oxygen saturation in the low 90s on room air, but high 90s-100% on 2L NC. The patient was subsequently weaned. There was a concern that perhaps she was lethargic and not taking deep breaths secondary to 4 anti-epileptic drugs, and therefore we staggered the timing and she was much more awake and alert. She completed her 7 day course of vancomycin and zosyn and appears to be more awake and alert.         Course has also been complicated by intermittent episodes of hypothermia (93F) and hypotensive to the SBP high 80s. Blood cultures were taken again, which were subsequently negative. Soft blood pressures responded well with 500cc NS bolus. Otherwise, the patient was weaned off nasal cannula and breathing comfortably on RA. Physical exam was indicative of clear lung sounds b/l and a PEG tube which was c/d/i. She is awake and alert, able to mumble and grunt. She is hemodynamically stable, completed her 7 day course of vancomycin and zosyn, and is ready for a safe medical discharge to nursing facility with close medical follow up.

## 2019-10-26 NOTE — PROGRESS NOTE ADULT - PROBLEM SELECTOR PLAN 3
-was hypoxic to 80s at Nursing home  -c/w Nasal Cannula, can attempt to wean  -CXR showed no acute process  -Etiology may be parainfluenza virus  -c/w Duonebs q6h standing  -VBG showed pO2 of 116 (40 yesterday), satting 100% on 2L NC

## 2019-10-26 NOTE — PROGRESS NOTE ADULT - ATTENDING COMMENTS
deep tissue injuries appear superficial.  strict off-loading.  continue local wound care at this time.

## 2019-10-26 NOTE — PROGRESS NOTE ADULT - PROBLEM SELECTOR PLAN 2
-10/25, patient hypotensive to 80s/50s  -was given 2 L LR which improved pressure  -If hypotensive again, will bolus 500 cc. If no response, can give midodrine 10q8h.   -MICU was consulted on 10/25, aware of patient

## 2019-10-26 NOTE — DISCHARGE NOTE PROVIDER - NSDCCPCAREPLAN_GEN_ALL_CORE_FT
PRINCIPAL DISCHARGE DIAGNOSIS  Diagnosis: Sepsis  Assessment and Plan of Treatment: You were admitted to the hospital for an infection in your lungs that was found to be a viral infection called Parainfluenza virus. Because of your recent history of being hospitalized in the Medical ICU and growing some dangerous bacteria called MRSA and Pseudomonas, you were started on antibiotics. You were given many breathing treatments and steroids, and your breathing improved. You no longer needed additional oxygen to help you breath and you were discharged to _________. If you have difficulty breathing, develop a fever, or have any other concerning symptoms, please return to the Emergency Department.      SECONDARY DISCHARGE DIAGNOSES  Diagnosis: UTI (urinary tract infection)  Assessment and Plan of Treatment: While you were in the hospital, you were found to be growing a bacteria called Proteus in your urine. The antibiotics that you were on were effective against this infection. PRINCIPAL DISCHARGE DIAGNOSIS  Diagnosis: Sepsis  Assessment and Plan of Treatment: You were admitted to the hospital because you had were unstable and required supplemental oxygen to help you breathe. We found that you had a viral infection causing you to be sick. But because you were recently admitted to the medical ICU, we felt that it was necessary that you recieve antibiotics to help cover for multi-drug resistant bugs, such as MRSA and Pseudomonas. Your hospital course was complicated by hypoxia, requiring additional oxygen through nasal cannula. We titrated the concentration of oxygen every day and you were eventually weaned off of the extra oxygen. Also, during [facility] hospitalization, you had received chest physiotherapy, mucomyst and frequent nasal and oral suctioning to clear the mucous out of your system. If you experience fevers, chills, nausea, vomiting, difficulty breathing or changes in behavior, please return to the emergency room. Please take all your medications as prescribed.      SECONDARY DISCHARGE DIAGNOSES  Diagnosis: UTI (urinary tract infection)  Assessment and Plan of Treatment: While you were in the hospital we found that you had a urinary tract infection. We treated you with 7 days of Zosyn, which is a strong antibiotic. Should you have symptoms of pain on urination, increased frequency of urination or fevers, chills, please return to the emergency room.    Diagnosis: Seizure disorder  Assessment and Plan of Treatment: Seizure disorder PRINCIPAL DISCHARGE DIAGNOSIS  Diagnosis: Sepsis  Assessment and Plan of Treatment: You were admitted to the hospital because you had were unstable and required supplemental oxygen to help you breathe. We found that you had a viral infection causing you to be sick. But because you were recently admitted to the medical ICU, we felt that it was necessary that you recieve antibiotics to help cover for multi-drug resistant bugs, such as MRSA and Pseudomonas. Your hospital course was complicated by hypoxia, requiring additional oxygen through nasal cannula. We titrated the concentration of oxygen every day and you were eventually weaned off of the extra oxygen. Also, during [facility] hospitalization, you had received chest physiotherapy, mucomyst and frequent nasal and oral suctioning to clear the mucous out of your system. If you experience fevers, chills, nausea, vomiting, difficulty breathing or changes in behavior, please return to the emergency room. Please take all your medications as prescribed.      SECONDARY DISCHARGE DIAGNOSES  Diagnosis: UTI (urinary tract infection)  Assessment and Plan of Treatment: While you were in the hospital we found that you had a urinary tract infection. We treated you with 7 days of Zosyn, which is a strong antibiotic. Should you have symptoms of pain on urination, increased frequency of urination or fevers, chills, please return to the emergency room.    Diagnosis: Hypothermia  Assessment and Plan of Treatment: During this hospitalization you were hypothermic, meaning that you had low body temperature. This could be due to the infection that you were having. We maintained your temperature we heating blankets and administered a 5 day course of steroids. This helped you stabilize and you were eventually at a normal body temperature ranging from 97-98 degrees F. Should you be hypothermic, have fevers, chills, nausea, vomiting or changes in behavior, please return to the emergency room.    Diagnosis: Seizure disorder  Assessment and Plan of Treatment: You were diagnosed with seizure disorder from before this hospitalization. It seems that you are on 4 anti-epileptic agents. During this hospitalization you were getting very lethargic and not taking in adequate breaths, and therefore we staggered the timing of your medications. We would like for you to keep the same schedule when you go back to the nursing home because this will ensure that you will awake and alert. Should you have seizures, or become too lethargic, pleae return to the emeregncy room. And as always please take your medications as prescribed and follow with outpatient neurologist.

## 2019-10-26 NOTE — PROGRESS NOTE ADULT - ASSESSMENT
53 y/o F w/ R posterior heel wound:  - pt seen in evaluated  - Vital signs stabilizing after hypotensive, 100.3F, no leukocytosis  - R posterior heel deep tissue injury down to subq, ecchymotic, mild periwound erythema, serous drainage, no purulence, no crepitation, no malodor   - stable, with no acute signs of infection  - Right foot xray reviewed showing no osteo, no gas. Internal hardware to the ankle  - no acute pod sx intervention at this time  - strict offloading to posterior heels w/ Z-Flows at all times  - will cont w/ local wound care and offloading  - Seen w/ attending

## 2019-10-27 LAB
ALBUMIN SERPL ELPH-MCNC: 2.3 G/DL — LOW (ref 3.3–5)
ALP SERPL-CCNC: 54 U/L — SIGNIFICANT CHANGE UP (ref 40–120)
ALT FLD-CCNC: 13 U/L — SIGNIFICANT CHANGE UP (ref 4–33)
ANION GAP SERPL CALC-SCNC: 11 MMO/L — SIGNIFICANT CHANGE UP (ref 7–14)
AST SERPL-CCNC: 51 U/L — HIGH (ref 4–32)
BASOPHILS # BLD AUTO: 0.01 K/UL — SIGNIFICANT CHANGE UP (ref 0–0.2)
BASOPHILS NFR BLD AUTO: 0.2 % — SIGNIFICANT CHANGE UP (ref 0–2)
BILIRUB SERPL-MCNC: < 0.2 MG/DL — LOW (ref 0.2–1.2)
BUN SERPL-MCNC: 25 MG/DL — HIGH (ref 7–23)
CALCIUM SERPL-MCNC: 8.1 MG/DL — LOW (ref 8.4–10.5)
CHLORIDE SERPL-SCNC: 100 MMOL/L — SIGNIFICANT CHANGE UP (ref 98–107)
CO2 SERPL-SCNC: 31 MMOL/L — SIGNIFICANT CHANGE UP (ref 22–31)
CREAT SERPL-MCNC: 0.75 MG/DL — SIGNIFICANT CHANGE UP (ref 0.5–1.3)
EOSINOPHIL # BLD AUTO: 0 K/UL — SIGNIFICANT CHANGE UP (ref 0–0.5)
EOSINOPHIL NFR BLD AUTO: 0 % — SIGNIFICANT CHANGE UP (ref 0–6)
GLUCOSE SERPL-MCNC: 110 MG/DL — HIGH (ref 70–99)
HCT VFR BLD CALC: 26 % — LOW (ref 34.5–45)
HGB BLD-MCNC: 7.9 G/DL — LOW (ref 11.5–15.5)
IMM GRANULOCYTES NFR BLD AUTO: 1.2 % — SIGNIFICANT CHANGE UP (ref 0–1.5)
LYMPHOCYTES # BLD AUTO: 2.57 K/UL — SIGNIFICANT CHANGE UP (ref 1–3.3)
LYMPHOCYTES # BLD AUTO: 45.1 % — HIGH (ref 13–44)
MAGNESIUM SERPL-MCNC: 2.3 MG/DL — SIGNIFICANT CHANGE UP (ref 1.6–2.6)
MCHC RBC-ENTMCNC: 30.4 % — LOW (ref 32–36)
MCHC RBC-ENTMCNC: 33.9 PG — SIGNIFICANT CHANGE UP (ref 27–34)
MCV RBC AUTO: 111.6 FL — HIGH (ref 80–100)
MONOCYTES # BLD AUTO: 0.69 K/UL — SIGNIFICANT CHANGE UP (ref 0–0.9)
MONOCYTES NFR BLD AUTO: 12.1 % — SIGNIFICANT CHANGE UP (ref 2–14)
NEUTROPHILS # BLD AUTO: 2.36 K/UL — SIGNIFICANT CHANGE UP (ref 1.8–7.4)
NEUTROPHILS NFR BLD AUTO: 41.4 % — LOW (ref 43–77)
NRBC # FLD: 0.06 K/UL — SIGNIFICANT CHANGE UP (ref 0–0)
NRBC FLD-RTO: 1.1 — SIGNIFICANT CHANGE UP
PHOSPHATE SERPL-MCNC: 3 MG/DL — SIGNIFICANT CHANGE UP (ref 2.5–4.5)
PLATELET # BLD AUTO: 107 K/UL — LOW (ref 150–400)
PMV BLD: 9.9 FL — SIGNIFICANT CHANGE UP (ref 7–13)
POTASSIUM SERPL-MCNC: 3.7 MMOL/L — SIGNIFICANT CHANGE UP (ref 3.5–5.3)
POTASSIUM SERPL-SCNC: 3.7 MMOL/L — SIGNIFICANT CHANGE UP (ref 3.5–5.3)
PROT SERPL-MCNC: 6.5 G/DL — SIGNIFICANT CHANGE UP (ref 6–8.3)
RBC # BLD: 2.33 M/UL — LOW (ref 3.8–5.2)
RBC # FLD: 15.8 % — HIGH (ref 10.3–14.5)
SODIUM SERPL-SCNC: 142 MMOL/L — SIGNIFICANT CHANGE UP (ref 135–145)
VANCOMYCIN FLD-MCNC: 16.5 UG/ML — SIGNIFICANT CHANGE UP
WBC # BLD: 5.7 K/UL — SIGNIFICANT CHANGE UP (ref 3.8–10.5)
WBC # FLD AUTO: 5.7 K/UL — SIGNIFICANT CHANGE UP (ref 3.8–10.5)

## 2019-10-27 PROCEDURE — 99233 SBSQ HOSP IP/OBS HIGH 50: CPT | Mod: GC

## 2019-10-27 RX ORDER — VANCOMYCIN HCL 1 G
500 VIAL (EA) INTRAVENOUS EVERY 12 HOURS
Refills: 0 | Status: DISCONTINUED | OUTPATIENT
Start: 2019-10-27 | End: 2019-10-29

## 2019-10-27 RX ORDER — IPRATROPIUM/ALBUTEROL SULFATE 18-103MCG
3 AEROSOL WITH ADAPTER (GRAM) INHALATION EVERY 6 HOURS
Refills: 0 | Status: DISCONTINUED | OUTPATIENT
Start: 2019-10-27 | End: 2019-11-04

## 2019-10-27 RX ORDER — SODIUM CHLORIDE 9 MG/ML
1000 INJECTION, SOLUTION INTRAVENOUS
Refills: 0 | Status: DISCONTINUED | OUTPATIENT
Start: 2019-10-27 | End: 2019-10-27

## 2019-10-27 RX ADMIN — HEPARIN SODIUM 5000 UNIT(S): 5000 INJECTION INTRAVENOUS; SUBCUTANEOUS at 14:25

## 2019-10-27 RX ADMIN — Medication 40 MILLIGRAM(S): at 14:25

## 2019-10-27 RX ADMIN — Medication 3 MILLILITER(S): at 15:39

## 2019-10-27 RX ADMIN — HEPARIN SODIUM 5000 UNIT(S): 5000 INJECTION INTRAVENOUS; SUBCUTANEOUS at 06:13

## 2019-10-27 RX ADMIN — Medication 2 DROP(S): at 06:11

## 2019-10-27 RX ADMIN — PIPERACILLIN AND TAZOBACTAM 25 GRAM(S): 4; .5 INJECTION, POWDER, LYOPHILIZED, FOR SOLUTION INTRAVENOUS at 00:21

## 2019-10-27 RX ADMIN — HEPARIN SODIUM 5000 UNIT(S): 5000 INJECTION INTRAVENOUS; SUBCUTANEOUS at 22:00

## 2019-10-27 RX ADMIN — PIPERACILLIN AND TAZOBACTAM 25 GRAM(S): 4; .5 INJECTION, POWDER, LYOPHILIZED, FOR SOLUTION INTRAVENOUS at 22:01

## 2019-10-27 RX ADMIN — Medication 650 MILLIGRAM(S): at 02:41

## 2019-10-27 RX ADMIN — OLANZAPINE 7.5 MILLIGRAM(S): 15 TABLET, FILM COATED ORAL at 11:22

## 2019-10-27 RX ADMIN — CLOBAZAM 10 MILLIGRAM(S): 10 TABLET ORAL at 17:26

## 2019-10-27 RX ADMIN — LACOSAMIDE 100 MILLIGRAM(S): 50 TABLET ORAL at 06:12

## 2019-10-27 RX ADMIN — Medication 3 MILLILITER(S): at 04:13

## 2019-10-27 RX ADMIN — LEVETIRACETAM 750 MILLIGRAM(S): 250 TABLET, FILM COATED ORAL at 06:12

## 2019-10-27 RX ADMIN — Medication 650 MILLIGRAM(S): at 22:00

## 2019-10-27 RX ADMIN — Medication 1 MILLIGRAM(S): at 11:28

## 2019-10-27 RX ADMIN — Medication 750 MILLIGRAM(S): at 17:26

## 2019-10-27 RX ADMIN — Medication 2 DROP(S): at 17:28

## 2019-10-27 RX ADMIN — Medication 650 MILLIGRAM(S): at 14:22

## 2019-10-27 RX ADMIN — NYSTATIN CREAM 1 APPLICATION(S): 100000 CREAM TOPICAL at 17:27

## 2019-10-27 RX ADMIN — CLOBAZAM 10 MILLIGRAM(S): 10 TABLET ORAL at 06:12

## 2019-10-27 RX ADMIN — PIPERACILLIN AND TAZOBACTAM 25 GRAM(S): 4; .5 INJECTION, POWDER, LYOPHILIZED, FOR SOLUTION INTRAVENOUS at 14:21

## 2019-10-27 RX ADMIN — Medication 750 MILLIGRAM(S): at 23:42

## 2019-10-27 RX ADMIN — Medication 1 APPLICATION(S): at 11:40

## 2019-10-27 RX ADMIN — Medication 750 MILLIGRAM(S): at 00:20

## 2019-10-27 RX ADMIN — Medication 100 MILLIGRAM(S): at 18:32

## 2019-10-27 RX ADMIN — Medication 1 APPLICATION(S): at 17:29

## 2019-10-27 RX ADMIN — Medication 81 MILLIGRAM(S): at 11:23

## 2019-10-27 RX ADMIN — NYSTATIN CREAM 1 APPLICATION(S): 100000 CREAM TOPICAL at 06:13

## 2019-10-27 RX ADMIN — Medication 1 TABLET(S): at 11:23

## 2019-10-27 RX ADMIN — Medication 3 MILLILITER(S): at 09:32

## 2019-10-27 RX ADMIN — LEVETIRACETAM 750 MILLIGRAM(S): 250 TABLET, FILM COATED ORAL at 18:32

## 2019-10-27 RX ADMIN — LACOSAMIDE 100 MILLIGRAM(S): 50 TABLET ORAL at 17:26

## 2019-10-27 RX ADMIN — Medication 750 MILLIGRAM(S): at 09:10

## 2019-10-27 RX ADMIN — LACOSAMIDE 100 MILLIGRAM(S): 50 TABLET ORAL at 11:22

## 2019-10-27 RX ADMIN — Medication 650 MILLIGRAM(S): at 09:09

## 2019-10-27 NOTE — PROGRESS NOTE ADULT - ATTENDING COMMENTS
Patient seen and examined, chart and labs reviewed. Case discussed with house staff.   Overall status improving compared to past 2 days- More awake, interactive  Vitals continue to improve  - continue Lyn Gonzalez on hold as level elvated yesterday, Monitor level  - Pt started on IV  steroids given  wheezing, hypotension  and  hypothermia- continue for now  -continue Nebs/Respiratory toilet  - continue PEG feeds  - Continue to closely monitor.

## 2019-10-27 NOTE — PROGRESS NOTE ADULT - SUBJECTIVE AND OBJECTIVE BOX
***************************************************************  Silvano Chan PGY2  Internal Medicine   Pager: [phone]  [facility] in house pager: [phone]    Mon-Fri: pager covered by day team 7am-7pm;   *Academic conferences 12pm-1pm  Sa/Sun: see chart, primary physician assigned available 7am-12pm  Sat/[employer] Coverage 12pm-7pm: [facility]- page [phone] for Team1-4, [facility] - pager forwarded to covering Resident  For Night coverage 7pm-7am: [facility]- page [phone] Team1-3, page [phone] Team4 & Care Model; [facility]- page [phone] for Team1-3, [id number] for Team4, Care Model  ***************************************************************    NAYANA JACQUES  54y  MRN: [id number]    Patient is a 54y old  Female who presents with a chief complaint of Sepsis (26 Oct 2019 09:47)      Subjective: no events ON. Denies fever, CP, SOB, abn pain, N/V, dysuria. Tolerating diet.      MEDICATIONS  (STANDING):  acetaminophen    Suspension .. 650 milliGRAM(s) Oral every 6 hours  albuterol/ipratropium for Nebulization 3 milliLiter(s) Nebulizer every 6 hours  artificial tears (preservative free) Ophthalmic Solution 2 Drop(s) Both EYES two times a day  aspirin  chewable 81 milliGRAM(s) Oral daily  BACItracin   Ointment 1 Application(s) Topical two times a day  cloBAZam 10 milliGRAM(s) Oral two times a day  folic acid 1 milliGRAM(s) Oral daily  heparin  Injectable 5000 Unit(s) SubCutaneous every 8 hours  lacosamide Solution 100 milliGRAM(s) Oral <User Schedule>  levETIRAcetam  Solution 750 milliGRAM(s) Oral every 12 hours  methylPREDNISolone sodium succinate Injectable 40 milliGRAM(s) IV Push daily  multivitamin 1 Tablet(s) Oral daily  nystatin Powder 1 Application(s) Topical two times a day  OLANZapine 7.5 milliGRAM(s) Oral daily  piperacillin/tazobactam IVPB.. 3.375 Gram(s) IV Intermittent every 8 hours  polyethylene glycol 3350 17 Gram(s) Oral daily  senna Syrup 10 milliLiter(s) Oral at bedtime  valproic  acid Syrup 750 milliGRAM(s) Oral every 8 hours    MEDICATIONS  (PRN):      Objective:    Vitals: Vital Signs Last 24 Hrs  T(C): 36.2 (10-27-19 @ 05:44), Max: 36.4 (10-26-19 @ 09:10)  T(F): 97.2 (10-27-19 @ 05:44), Max: 97.6 (10-26-19 @ 09:10)  HR: 79 (10-27-19 @ 05:44) (73 - 89)  BP: 101/64 (10-27-19 @ 05:44) (101/64 - 116/42)  BP(mean): --  RR: 18 (10-27-19 @ 05:44) (17 - 18)  SpO2: 98% (10-27-19 @ 05:44) (94% - 100%)              I&O's Summary      PHYSICAL EXAM:  GENERAL: no acute distress, overweight, nonverbal, on nasal cannula, opens eyes with verbal and tactile stimulus  HEAD: Atraumatic, Normocephalic  EYES: conjunctiva and sclera clear  ENMT: Dry mucous membranes  NECK: Supple  NERVOUS SYSTEM: Alert & Oriented X0, Unable to assess strength  CHEST/LUNG: faint bilateral wheezing in anterior lung fields. pt breathing without any apparent distress  HEART: Regular rate and rhythm; S1 and S2; No murmurs, rubs, or gallops  ABDOMEN: Soft, Nontender, Nondistended: Bowel sounds present, PEG tube present  EXTREMITIES: 2+ Peripheral Pulses    LABS:                        8.5    4.95  )-----------( 122      ( 26 Oct 2019 06:45 )             29.1                         9.0    6.72  )-----------( 101      ( 25 Oct 2019 14:30 )             31.0                         7.5    7.83  )-----------( 100      ( 25 Oct 2019 05:45 )             25.2     10-26    141  |  100  |  21  ----------------------------<  107<H>  4.9   |  31  |  0.72  10-25    141  |  99  |  26<H>  ----------------------------<  94  3.3<L>   |  33<H>  |  0.76  10-24    139  |  95<L>  |  31<H>  ----------------------------<  123<H>  3.8   |  34<H>  |  0.87    Ca    8.1<L>      26 Oct 2019 06:45  Ca    8.1<L>      25 Oct 2019 05:45  Ca    8.6      24 Oct 2019 14:35  Phos  3.8     10-26  Mg     2.3     10-26    TPro  7.8  /  Alb  3.0<L>  /  TBili  0.3  /  DBili  x   /  AST  27  /  ALT  7   /  AlkPhos  62  10-24                      CAPILLARY BLOOD GLUCOSE        RADIOLOGY & ADDITIONAL TESTS:  Imaging Personally Reviewed:  [x ] YES  [ ] NO  Consultants involved in case:   Consultant(s) Notes Reviewed:  [ x] YES  [ ] NO:   Care Discussed with Consultants/Other Providers [x ] YES  [ ] NO ***************************************************************  Silvano Chan PGY2  Internal Medicine   Pager: [phone]  [facility] in house pager: [phone]    Mon-Fri: pager covered by day team 7am-7pm;   *Academic conferences 12pm-1pm  Sa/Sun: see chart, primary physician assigned available 7am-12pm  Sat/[employer] Coverage 12pm-7pm: [facility]- page [phone] for Team1-4, [facility] - pager forwarded to covering Resident  For Night coverage 7pm-7am: [facility]- page 1443 Team1-3, page [phone] Team4 & Care Model; [facility]- page [phone] for Team1-3, [id number] for Team4, Care Model  ***************************************************************    NAYANA JACQUES  54y  MRN: [id number]    Patient is a 54y old  Female who presents with a chief complaint of Sepsis (26 Oct 2019 09:47)      Subjective: no events ON. Pt appears alert this AM. Temperature remained stable overnight. Had deep suctioning done this AM with removal of a lot of secretions. Unable to obtain further ROS given clinical condition (CP).    MEDICATIONS  (STANDING):  acetaminophen    Suspension .. 650 milliGRAM(s) Oral every 6 hours  albuterol/ipratropium for Nebulization 3 milliLiter(s) Nebulizer every 6 hours  artificial tears (preservative free) Ophthalmic Solution 2 Drop(s) Both EYES two times a day  aspirin  chewable 81 milliGRAM(s) Oral daily  BACItracin   Ointment 1 Application(s) Topical two times a day  cloBAZam 10 milliGRAM(s) Oral two times a day  folic acid 1 milliGRAM(s) Oral daily  heparin  Injectable 5000 Unit(s) SubCutaneous every 8 hours  lacosamide Solution 100 milliGRAM(s) Oral <User Schedule>  levETIRAcetam  Solution 750 milliGRAM(s) Oral every 12 hours  methylPREDNISolone sodium succinate Injectable 40 milliGRAM(s) IV Push daily  multivitamin 1 Tablet(s) Oral daily  nystatin Powder 1 Application(s) Topical two times a day  OLANZapine 7.5 milliGRAM(s) Oral daily  piperacillin/tazobactam IVPB.. 3.375 Gram(s) IV Intermittent every 8 hours  polyethylene glycol 3350 17 Gram(s) Oral daily  senna Syrup 10 milliLiter(s) Oral at bedtime  valproic  acid Syrup 750 milliGRAM(s) Oral every 8 hours    MEDICATIONS  (PRN):      Objective:    Vitals: Vital Signs Last 24 Hrs  T(C): 36.2 (10-27-19 @ 05:44), Max: 36.4 (10-26-19 @ 09:10)  T(F): 97.2 (10-27-19 @ 05:44), Max: 97.6 (10-26-19 @ 09:10)  HR: 79 (10-27-19 @ 05:44) (73 - 89)  BP: 101/64 (10-27-19 @ 05:44) (101/64 - 116/42)  BP(mean): --  RR: 18 (10-27-19 @ 05:44) (17 - 18)  SpO2: 98% (10-27-19 @ 05:44) (94% - 100%)              I&O's Summary      PHYSICAL EXAM:  GENERAL: no acute distress, overweight, nonverbal, on nasal cannula, opens eyes with verbal and tactile stimulus. appears very alert, opens eyes looking around  HEAD: Atraumatic, Normocephalic  EYES: conjunctiva and sclera clear  ENMT: Dry mucous membranes  NECK: Supple  NERVOUS SYSTEM: Alert & Oriented X0, Unable to assess strength  CHEST/LUNG: no wheezing, faint rhonchorous breath sounds in anterior chest  HEART: Regular rate and rhythm; S1 and S2; No murmurs, rubs, or gallops  ABDOMEN: Soft, Nontender, Nondistended: Bowel sounds present, PEG tube present  EXTREMITIES: 2+ Peripheral Pulses    LABS:                        8.5    4.95  )-----------( 122      ( 26 Oct 2019 06:45 )             29.1                         9.0    6.72  )-----------( 101      ( 25 Oct 2019 14:30 )             31.0                         7.5    7.83  )-----------( 100      ( 25 Oct 2019 05:45 )             25.2     10-26    141  |  100  |  21  ----------------------------<  107<H>  4.9   |  31  |  0.72  10-25    141  |  99  |  26<H>  ----------------------------<  94  3.3<L>   |  33<H>  |  0.76  10-24    139  |  95<L>  |  31<H>  ----------------------------<  123<H>  3.8   |  34<H>  |  0.87    Ca    8.1<L>      26 Oct 2019 06:45  Ca    8.1<L>      25 Oct 2019 05:45  Ca    8.6      24 Oct 2019 14:35  Phos  3.8     10-26  Mg     2.3     10-26    TPro  7.8  /  Alb  3.0<L>  /  TBili  0.3  /  DBili  x   /  AST  27  /  ALT  7   /  AlkPhos  62  10-24                      CAPILLARY BLOOD GLUCOSE        RADIOLOGY & ADDITIONAL TESTS:  Imaging Personally Reviewed:  [x ] YES  [ ] NO  Consultants involved in case:   Consultant(s) Notes Reviewed:  [ x] YES  [ ] NO:   Care Discussed with Consultants/Other Providers [x ] YES  [ ] NO ***************************************************************  Silvano Chan PGY2  Internal Medicine   Pager: [phone]  [facility] in house pager: [phone]    Mon-Fri: pager covered by day team 7am-7pm;   *Academic conferences 12pm-1pm  Sa/Sun: see chart, primary physician assigned available 7am-12pm  Sat/[employer] Coverage 12pm-7pm: [facility]- page 1443 for Team1-4, [facility] - pager forwarded to covering Resident  For Night coverage 7pm-7am: [facility]- page 1443 Team1-3, page 1446 Team4 & Care Model; [facility]- page [id number] for Team1-3, [id number] for Team4, Care Model  ***************************************************************    NAYANA JACQUES  54y  MRN: [id number]    Patient is a 54y old  Female who presents with a chief complaint of Sepsis (26 Oct 2019 09:47)      Subjective: no events ON. Pt appears alert this AM. Temperature remained stable overnight. Had deep suctioning done this AM with removal of a lot of secretions. Unable to obtain further ROS given clinical condition (CP). attempted to reach mother - attempted to reach mother but unable to reach    MEDICATIONS  (STANDING):  acetaminophen    Suspension .. 650 milliGRAM(s) Oral every 6 hours  albuterol/ipratropium for Nebulization 3 milliLiter(s) Nebulizer every 6 hours  artificial tears (preservative free) Ophthalmic Solution 2 Drop(s) Both EYES two times a day  aspirin  chewable 81 milliGRAM(s) Oral daily  BACItracin   Ointment 1 Application(s) Topical two times a day  cloBAZam 10 milliGRAM(s) Oral two times a day  folic acid 1 milliGRAM(s) Oral daily  heparin  Injectable 5000 Unit(s) SubCutaneous every 8 hours  lacosamide Solution 100 milliGRAM(s) Oral <User Schedule>  levETIRAcetam  Solution 750 milliGRAM(s) Oral every 12 hours  methylPREDNISolone sodium succinate Injectable 40 milliGRAM(s) IV Push daily  multivitamin 1 Tablet(s) Oral daily  nystatin Powder 1 Application(s) Topical two times a day  OLANZapine 7.5 milliGRAM(s) Oral daily  piperacillin/tazobactam IVPB.. 3.375 Gram(s) IV Intermittent every 8 hours  polyethylene glycol 3350 17 Gram(s) Oral daily  senna Syrup 10 milliLiter(s) Oral at bedtime  valproic  acid Syrup 750 milliGRAM(s) Oral every 8 hours    MEDICATIONS  (PRN):      Objective:    Vitals: Vital Signs Last 24 Hrs  T(C): 36.2 (10-27-19 @ 05:44), Max: 36.4 (10-26-19 @ 09:10)  T(F): 97.2 (10-27-19 @ 05:44), Max: 97.6 (10-26-19 @ 09:10)  HR: 79 (10-27-19 @ 05:44) (73 - 89)  BP: 101/64 (10-27-19 @ 05:44) (101/64 - 116/42)  BP(mean): --  RR: 18 (10-27-19 @ 05:44) (17 - 18)  SpO2: 98% (10-27-19 @ 05:44) (94% - 100%)              I&O's Summary      PHYSICAL EXAM:  GENERAL: no acute distress, overweight, nonverbal, on nasal cannula, opens eyes with verbal and tactile stimulus. appears very alert, opens eyes looking around  HEAD: Atraumatic, Normocephalic  EYES: conjunctiva and sclera clear  ENMT: Dry mucous membranes  NECK: Supple  NERVOUS SYSTEM: Alert & Oriented X0, Unable to assess strength  CHEST/LUNG: no wheezing, faint rhonchorous breath sounds in anterior chest  HEART: Regular rate and rhythm; S1 and S2; No murmurs, rubs, or gallops  ABDOMEN: Soft, Nontender, Nondistended: Bowel sounds present, PEG tube present  EXTREMITIES: 2+ Peripheral Pulses    LABS:                        8.5    4.95  )-----------( 122      ( 26 Oct 2019 06:45 )             29.1                         9.0    6.72  )-----------( 101      ( 25 Oct 2019 14:30 )             31.0                         7.5    7.83  )-----------( 100      ( 25 Oct 2019 05:45 )             25.2     10-26    141  |  100  |  21  ----------------------------<  107<H>  4.9   |  31  |  0.72  10-25    141  |  99  |  26<H>  ----------------------------<  94  3.3<L>   |  33<H>  |  0.76  10-24    139  |  95<L>  |  31<H>  ----------------------------<  123<H>  3.8   |  34<H>  |  0.87    Ca    8.1<L>      26 Oct 2019 06:45  Ca    8.1<L>      25 Oct 2019 05:45  Ca    8.6      24 Oct 2019 14:35  Phos  3.8     10-26  Mg     2.3     10-26    TPro  7.8  /  Alb  3.0<L>  /  TBili  0.3  /  DBili  x   /  AST  27  /  ALT  7   /  AlkPhos  62  10-24                      CAPILLARY BLOOD GLUCOSE        RADIOLOGY & ADDITIONAL TESTS:  Imaging Personally Reviewed:  [x ] YES  [ ] NO  Consultants involved in case:   Consultant(s) Notes Reviewed:  [ x] YES  [ ] NO:   Care Discussed with Consultants/Other Providers [x ] YES  [ ] NO

## 2019-10-27 NOTE — PROGRESS NOTE ADULT - ASSESSMENT
54F w/ CP and seizures, presenting for fevers, tachycardia, and hypoxia to 80s w/ recent hospitalization requiring MICU pressor support, intubation for Pseudomonas and MRSA PNA, now w/ PEG tube, now admitted for sepsis 2/2 parainfluenza virus and proteus UTI. Currently on BS abx given increased risk for HAP and aspiration PNA. HC c/b hypothermia and hypotension, now on solumedrol, duonebs ATC and IVF.

## 2019-10-27 NOTE — PROGRESS NOTE ADULT - PROBLEM SELECTOR PLAN 2
-10/25, patient hypotensive to 80s/50s  -was given 2 L LR which improved pressure  -If hypotensive again, will bolus 500 cc. If no response, can start midodrine 10q8h.   -MICU was consulted on 10/25, aware of patient

## 2019-10-27 NOTE — PROGRESS NOTE ADULT - PROBLEM SELECTOR PLAN 1
-initially p/w fever, tachycardia, leukocytosis  -recent admission in MICU 09/2019 for aspiration PNA, w/ cx + pseudomonas and MRSA  -s/p ceftriaxone and azithro in ED  -c/w vanc/zosyn (held Vanc 10/27 for elevated level 30.9), will check random vanc today AM and redose as needed - day 4/7 of abx today  -blood cx NGTD and urine cultures +proteus (pansensitive)  -RVP + parainfluenza virus  -c/w gentle IV hydration  -Initially lactate of 2.3, decreased to 1.9 after 1L NS bolus

## 2019-10-27 NOTE — PROGRESS NOTE ADULT - PROBLEM SELECTOR PLAN 3
-was hypoxic to 80s at Nursing home  -c/w Nasal Cannula, will attempt to wean  -CXR showed no acute process  -Etiology may be parainfluenza virus  -c/w Duonebs q6h standing

## 2019-10-27 NOTE — PROGRESS NOTE ADULT - PROBLEM SELECTOR PLAN 5
-Per family, patient has history of hypothermia and has been noted on previous admissions  -Patient was hypothermic on 10/25, however has been able to maintain adequate temperatures since receiving stress dose of steroids and having bear hugger.

## 2019-10-28 LAB
ALBUMIN SERPL ELPH-MCNC: 2.3 G/DL — LOW (ref 3.3–5)
ALP SERPL-CCNC: 55 U/L — SIGNIFICANT CHANGE UP (ref 40–120)
ALT FLD-CCNC: 12 U/L — SIGNIFICANT CHANGE UP (ref 4–33)
ANION GAP SERPL CALC-SCNC: 9 MMO/L — SIGNIFICANT CHANGE UP (ref 7–14)
ANISOCYTOSIS BLD QL: SLIGHT — SIGNIFICANT CHANGE UP
AST SERPL-CCNC: 38 U/L — HIGH (ref 4–32)
BASOPHILS # BLD AUTO: 0.01 K/UL — SIGNIFICANT CHANGE UP (ref 0–0.2)
BASOPHILS NFR BLD AUTO: 0.3 % — SIGNIFICANT CHANGE UP (ref 0–2)
BILIRUB SERPL-MCNC: < 0.2 MG/DL — LOW (ref 0.2–1.2)
BUN SERPL-MCNC: 24 MG/DL — HIGH (ref 7–23)
CALCIUM SERPL-MCNC: 8 MG/DL — LOW (ref 8.4–10.5)
CHLORIDE SERPL-SCNC: 101 MMOL/L — SIGNIFICANT CHANGE UP (ref 98–107)
CO2 SERPL-SCNC: 32 MMOL/L — HIGH (ref 22–31)
CREAT SERPL-MCNC: 0.68 MG/DL — SIGNIFICANT CHANGE UP (ref 0.5–1.3)
EOSINOPHIL # BLD AUTO: 0 K/UL — SIGNIFICANT CHANGE UP (ref 0–0.5)
EOSINOPHIL NFR BLD AUTO: 0 % — SIGNIFICANT CHANGE UP (ref 0–6)
GLUCOSE SERPL-MCNC: 152 MG/DL — HIGH (ref 70–99)
HCT VFR BLD CALC: 28.8 % — LOW (ref 34.5–45)
HGB BLD-MCNC: 8.8 G/DL — LOW (ref 11.5–15.5)
IMM GRANULOCYTES NFR BLD AUTO: 2.1 % — HIGH (ref 0–1.5)
LYMPHOCYTES # BLD AUTO: 1.03 K/UL — SIGNIFICANT CHANGE UP (ref 1–3.3)
LYMPHOCYTES # BLD AUTO: 26.4 % — SIGNIFICANT CHANGE UP (ref 13–44)
MACROCYTES BLD QL: SLIGHT — SIGNIFICANT CHANGE UP
MAGNESIUM SERPL-MCNC: 2.3 MG/DL — SIGNIFICANT CHANGE UP (ref 1.6–2.6)
MANUAL SMEAR VERIFICATION: SIGNIFICANT CHANGE UP
MCHC RBC-ENTMCNC: 30.6 % — LOW (ref 32–36)
MCHC RBC-ENTMCNC: 34.2 PG — HIGH (ref 27–34)
MCV RBC AUTO: 112.1 FL — HIGH (ref 80–100)
MONOCYTES # BLD AUTO: 0.32 K/UL — SIGNIFICANT CHANGE UP (ref 0–0.9)
MONOCYTES NFR BLD AUTO: 8.2 % — SIGNIFICANT CHANGE UP (ref 2–14)
NEUTROPHILS # BLD AUTO: 2.46 K/UL — SIGNIFICANT CHANGE UP (ref 1.8–7.4)
NEUTROPHILS NFR BLD AUTO: 63 % — SIGNIFICANT CHANGE UP (ref 43–77)
NRBC # FLD: 0.04 K/UL — SIGNIFICANT CHANGE UP (ref 0–0)
NRBC FLD-RTO: 1 — SIGNIFICANT CHANGE UP
PHOSPHATE SERPL-MCNC: 2.9 MG/DL — SIGNIFICANT CHANGE UP (ref 2.5–4.5)
PLATELET # BLD AUTO: 115 K/UL — LOW (ref 150–400)
PLATELET COUNT - ESTIMATE: SIGNIFICANT CHANGE UP
PMV BLD: 10.2 FL — SIGNIFICANT CHANGE UP (ref 7–13)
POLYCHROMASIA BLD QL SMEAR: SLIGHT — SIGNIFICANT CHANGE UP
POTASSIUM SERPL-MCNC: 4.3 MMOL/L — SIGNIFICANT CHANGE UP (ref 3.5–5.3)
POTASSIUM SERPL-SCNC: 4.3 MMOL/L — SIGNIFICANT CHANGE UP (ref 3.5–5.3)
PROT SERPL-MCNC: 6.2 G/DL — SIGNIFICANT CHANGE UP (ref 6–8.3)
RBC # BLD: 2.57 M/UL — LOW (ref 3.8–5.2)
RBC # FLD: 15.9 % — HIGH (ref 10.3–14.5)
REVIEW TO FOLLOW: YES — SIGNIFICANT CHANGE UP
SODIUM SERPL-SCNC: 142 MMOL/L — SIGNIFICANT CHANGE UP (ref 135–145)
WBC # BLD: 3.9 K/UL — SIGNIFICANT CHANGE UP (ref 3.8–10.5)
WBC # FLD AUTO: 3.9 K/UL — SIGNIFICANT CHANGE UP (ref 3.8–10.5)

## 2019-10-28 PROCEDURE — 99233 SBSQ HOSP IP/OBS HIGH 50: CPT | Mod: GC

## 2019-10-28 RX ADMIN — Medication 650 MILLIGRAM(S): at 15:25

## 2019-10-28 RX ADMIN — Medication 2 DROP(S): at 05:01

## 2019-10-28 RX ADMIN — HEPARIN SODIUM 5000 UNIT(S): 5000 INJECTION INTRAVENOUS; SUBCUTANEOUS at 21:41

## 2019-10-28 RX ADMIN — Medication 1 APPLICATION(S): at 18:41

## 2019-10-28 RX ADMIN — Medication 650 MILLIGRAM(S): at 03:40

## 2019-10-28 RX ADMIN — LACOSAMIDE 100 MILLIGRAM(S): 50 TABLET ORAL at 18:41

## 2019-10-28 RX ADMIN — LEVETIRACETAM 750 MILLIGRAM(S): 250 TABLET, FILM COATED ORAL at 18:59

## 2019-10-28 RX ADMIN — PIPERACILLIN AND TAZOBACTAM 25 GRAM(S): 4; .5 INJECTION, POWDER, LYOPHILIZED, FOR SOLUTION INTRAVENOUS at 06:08

## 2019-10-28 RX ADMIN — Medication 1 TABLET(S): at 11:34

## 2019-10-28 RX ADMIN — Medication 100 MILLIGRAM(S): at 15:28

## 2019-10-28 RX ADMIN — Medication 81 MILLIGRAM(S): at 11:35

## 2019-10-28 RX ADMIN — CLOBAZAM 10 MILLIGRAM(S): 10 TABLET ORAL at 05:01

## 2019-10-28 RX ADMIN — Medication 750 MILLIGRAM(S): at 18:29

## 2019-10-28 RX ADMIN — Medication 650 MILLIGRAM(S): at 08:51

## 2019-10-28 RX ADMIN — LEVETIRACETAM 750 MILLIGRAM(S): 250 TABLET, FILM COATED ORAL at 05:00

## 2019-10-28 RX ADMIN — NYSTATIN CREAM 1 APPLICATION(S): 100000 CREAM TOPICAL at 18:41

## 2019-10-28 RX ADMIN — LACOSAMIDE 100 MILLIGRAM(S): 50 TABLET ORAL at 11:41

## 2019-10-28 RX ADMIN — Medication 100 MILLIGRAM(S): at 05:00

## 2019-10-28 RX ADMIN — Medication 40 MILLIGRAM(S): at 05:00

## 2019-10-28 RX ADMIN — HEPARIN SODIUM 5000 UNIT(S): 5000 INJECTION INTRAVENOUS; SUBCUTANEOUS at 11:35

## 2019-10-28 RX ADMIN — NYSTATIN CREAM 1 APPLICATION(S): 100000 CREAM TOPICAL at 05:00

## 2019-10-28 RX ADMIN — CLOBAZAM 10 MILLIGRAM(S): 10 TABLET ORAL at 18:59

## 2019-10-28 RX ADMIN — Medication 1 APPLICATION(S): at 05:01

## 2019-10-28 RX ADMIN — LACOSAMIDE 100 MILLIGRAM(S): 50 TABLET ORAL at 05:01

## 2019-10-28 RX ADMIN — PIPERACILLIN AND TAZOBACTAM 25 GRAM(S): 4; .5 INJECTION, POWDER, LYOPHILIZED, FOR SOLUTION INTRAVENOUS at 21:41

## 2019-10-28 RX ADMIN — Medication 2 DROP(S): at 18:41

## 2019-10-28 RX ADMIN — HEPARIN SODIUM 5000 UNIT(S): 5000 INJECTION INTRAVENOUS; SUBCUTANEOUS at 05:00

## 2019-10-28 RX ADMIN — OLANZAPINE 7.5 MILLIGRAM(S): 15 TABLET, FILM COATED ORAL at 11:35

## 2019-10-28 RX ADMIN — Medication 1 MILLIGRAM(S): at 11:41

## 2019-10-28 RX ADMIN — Medication 650 MILLIGRAM(S): at 21:40

## 2019-10-28 RX ADMIN — PIPERACILLIN AND TAZOBACTAM 25 GRAM(S): 4; .5 INJECTION, POWDER, LYOPHILIZED, FOR SOLUTION INTRAVENOUS at 14:28

## 2019-10-28 RX ADMIN — Medication 750 MILLIGRAM(S): at 08:51

## 2019-10-28 NOTE — PROGRESS NOTE ADULT - ATTENDING COMMENTS
Patient seen and examined. Agree with above note by resident.    #Parainfluenza infection with superimposed bacterial process - on vanc/zosyn day 5/7. Clinically improving. Off jose hugger today. Continue to monitor clinically. Hypotensive few days ago but improved with steroids. BP stable today.   #Hypoxic respiratory state - titrate off 02 as tolerated. C/w pulmonary suctioning   #Proteus UTI - on zosyn day 5/7    Plan discussed with RN and HS

## 2019-10-28 NOTE — PROGRESS NOTE ADULT - PROBLEM SELECTOR PLAN 3
- was hypoxic to 80s at Nursing home  - c/w Nasal Cannula, will attempt to wean (satting in the mid 90s)  - CXR showed no acute process, RVP positive for parainfluenza   - c/w Duonebs q6h standing

## 2019-10-28 NOTE — PROGRESS NOTE ADULT - ASSESSMENT
54F w/ CP and seizures, presenting for fevers, tachycardia, and hypoxia to 80s w/ recent hospitalization requiring MICU pressor support, intubation for Pseudomonas and MRSA PNA, now w/ PEG tube, now admitted for sepsis 2/2 parainfluenza virus and proteus UTI. Currently on BS abx given increased risk for HAP and aspiration PNA. HC c/b hypothermia and hypotension, now on solumedrol, duonebs ATC and IVF. 54F w/ CP and seizures, presenting for fevers, tachycardia, and hypoxia to 80s w/ recent hospitalization requiring MICU pressor support, intubation for Pseudomonas and MRSA PNA, now w/ PEG tube, now admitted for sepsis 2/2 parainfluenza virus and proteus UTI. Currently on BS abx given increased risk for HAP and aspiration PNA. HC c/b hypothermia and hypotension, now on solumedrol, duonebs ATC and IVF. Hypothermia has now resolved, and the patient is currently stable with SBP in the low 100s.

## 2019-10-28 NOTE — PROGRESS NOTE ADULT - PROBLEM SELECTOR PLAN 1
-initially p/w fever, tachycardia, leukocytosis  -recent admission in MICU 09/2019 for aspiration PNA, w/ cx + pseudomonas and MRSA  -s/p ceftriaxone and azithro in ED  - c/w vanc/zosyn (held Vanc 10/27 for elevated level 30.9), will check random vanc today AM and redose as needed - day 5/7 of abx today  - blood cx NGTD and urine cultures +proteus (pansensitive)  - RVP + parainfluenza virus  - c/w gentle IV hydration  -Initially lactate of 2.3, decreased to 1.9 after 1L NS bolus [Feeds self with help] : feeds self with help [Dresses self with help] : dresses self with help [Puts on T-shirt] : puts on t-shirt [Wash and dry hand] : wash and dry hand  [Brushes teeth, no help] : brushes teeth, no help [Day toilet trained for bowel and bladder] : day toilet trained for bowel and bladder [Imaginative play] : imaginative play [Plays board/card games] : plays board/card games [Names friend] : names friend [Copies [place]] : copies [place] [Draws person with 2 body parts] : draws person with 2 body parts [Thumb wiggle] : thumb wiggle  [Copies vertical line] : copies vertical line  [2-3 sentences] : 2-3 sentences [Understandable speech 75% of time] : understandable speech 75% of time [Identifies self as girl/boy] : identifies self as girl/boy [Understands 4 prepositions] : understands 4 prepositions  [Knows 4 actions] : knows 4 actions [Knows 2 adjectives] : knows 2 adjectives [Knows 4 pictures] : knows 4 pictures [Names a friend] : names a friend [Throws ball overhead] : throws ball overhead [Walks up stairs alternating feet] : walks up stairs alternating feet [Balances on each foot 3 seconds] : balances on each foot 3 seconds - initially p/w fever, tachycardia, leukocytosis  - s/p ceftriaxone and azithro in ED  - Vanc and zosyn- day 5/7 of abx today  - vanc level this AM was 16, can redose  - RVP + parainfluenza virus  - c/w gentle IV hydration [Broad jump] : broad jump

## 2019-10-28 NOTE — PROGRESS NOTE ADULT - SUBJECTIVE AND OBJECTIVE BOX
***************************************************************  Brii Palacios PGY 1  Internal Medicine   Pager: [phone]  [facility] in house pager: [phone]  ***************************************************************    CC: Patient is a 54y old  Female who presents with a chief complaint of Sepsis (26 Oct 2019 09:47)    Subjective: Overnight the patient had one episode of hypotension to 99/62, but continued to by asymptomatic, no intervention at that time. Additionally the patient had been having several episodes of watery diarrhea and nursing had requested     MEDICATIONS  (STANDING):  acetaminophen    Suspension .. 650 milliGRAM(s) Oral every 6 hours  albuterol/ipratropium for Nebulization 3 milliLiter(s) Nebulizer every 6 hours  artificial tears (preservative free) Ophthalmic Solution 2 Drop(s) Both EYES two times a day  aspirin  chewable 81 milliGRAM(s) Oral daily  BACItracin   Ointment 1 Application(s) Topical two times a day  cloBAZam 10 milliGRAM(s) Oral two times a day  folic acid 1 milliGRAM(s) Oral daily  heparin  Injectable 5000 Unit(s) SubCutaneous every 8 hours  lacosamide Solution 100 milliGRAM(s) Oral <User Schedule>  levETIRAcetam  Solution 750 milliGRAM(s) Oral every 12 hours  methylPREDNISolone sodium succinate Injectable 40 milliGRAM(s) IV Push daily  multivitamin 1 Tablet(s) Oral daily  nystatin Powder 1 Application(s) Topical two times a day  OLANZapine 7.5 milliGRAM(s) Oral daily  piperacillin/tazobactam IVPB.. 3.375 Gram(s) IV Intermittent every 8 hours  polyethylene glycol 3350 17 Gram(s) Oral daily  senna Syrup 10 milliLiter(s) Oral at bedtime  valproic  acid Syrup 750 milliGRAM(s) Oral every 8 hours    MEDICATIONS  (PRN):      Objective:  Vital Signs Last 24 Hrs  T(C): 37.7 (28 Oct 2019 05:30), Max: 37.7 (28 Oct 2019 01:34)  T(F): 99.8 (28 Oct 2019 05:30), Max: 99.9 (28 Oct 2019 01:34)  HR: 85 (28 Oct 2019 05:30) (68 - 98)  BP: 99/62 (28 Oct 2019 05:30) (97/60 - 113/79)  BP(mean): --  RR: 17 (28 Oct 2019 05:30) (16 - 18)  SpO2: 94% (28 Oct 2019 05:30) (94% - 98%)           I&O's Summary    PHYSICAL EXAM:  GENERAL: no acute distress, overweight, nonverbal, on nasal cannula, opens eyes with verbal and tactile stimulus. appears very alert, opens eyes looking around  HEAD: Atraumatic, Normocephalic  EYES: conjunctiva and sclera clear  ENMT: Dry mucous membranes  NECK: Supple  NERVOUS SYSTEM: Alert & Oriented X0, Unable to assess strength  CHEST/LUNG: no wheezing, faint rhonchorous breath sounds in anterior chest  HEART: Regular rate and rhythm; S1 and S2; No murmurs, rubs, or gallops  ABDOMEN: Soft, Nontender, Nondistended: Bowel sounds present, PEG tube present  EXTREMITIES: 2+ Peripheral Pulses    LABS:                                   7.9    5.70  )-----------( 107      ( 27 Oct 2019 08:20 )             26.0     10-27    142  |  100  |  25<H>  ----------------------------<  110<H>  3.7   |  31  |  0.75    Ca    8.1<L>      27 Oct 2019 05:28  Phos  3.0     10-27  Mg     2.3     10-27    TPro  6.5  /  Alb  2.3<L>  /  TBili  < 0.2<L>  /  DBili  x   /  AST  51<H>  /  ALT  13  /  AlkPhos  54  10-27                   CAPILLARY BLOOD GLUCOSE        RADIOLOGY & ADDITIONAL TESTS:  Imaging Personally Reviewed:  [x ] YES  [ ] NO  Consultants involved in case:   Consultant(s) Notes Reviewed:  [ x] YES  [ ] NO:   Care Discussed with Consultants/Other Providers [x ] YES  [ ] NO ***************************************************************  Brii Palacios PGY 1  Internal Medicine   Pager: [phone]  [facility] in house pager: [phone]  ***************************************************************    CC: Patient is a 54y old  Female who presents with a chief complaint of Sepsis (26 Oct 2019 09:47)    Subjective: Overnight the patient had one episode of hypotension to 99/62, but continued to by asymptomatic, no intervention at that time. Additionally the patient had been having several episodes of watery diarrhea and nursing had requested Flexiseal be placed. Otherwise this AM, the patient is non-verbal at baseline, but is able to grunt and show signs of discomfort if needed, but appears comfortable at this point. Other ROS cannot be obtained at this point.     MEDICATIONS  (STANDING):  acetaminophen    Suspension .. 650 milliGRAM(s) Oral every 6 hours  albuterol/ipratropium for Nebulization 3 milliLiter(s) Nebulizer every 6 hours  artificial tears (preservative free) Ophthalmic Solution 2 Drop(s) Both EYES two times a day  aspirin  chewable 81 milliGRAM(s) Oral daily  BACItracin   Ointment 1 Application(s) Topical two times a day  cloBAZam 10 milliGRAM(s) Oral two times a day  folic acid 1 milliGRAM(s) Oral daily  heparin  Injectable 5000 Unit(s) SubCutaneous every 8 hours  lacosamide Solution 100 milliGRAM(s) Oral <User Schedule>  levETIRAcetam  Solution 750 milliGRAM(s) Oral every 12 hours  methylPREDNISolone sodium succinate Injectable 40 milliGRAM(s) IV Push daily  multivitamin 1 Tablet(s) Oral daily  nystatin Powder 1 Application(s) Topical two times a day  OLANZapine 7.5 milliGRAM(s) Oral daily  piperacillin/tazobactam IVPB.. 3.375 Gram(s) IV Intermittent every 8 hours  polyethylene glycol 3350 17 Gram(s) Oral daily  senna Syrup 10 milliLiter(s) Oral at bedtime  valproic  acid Syrup 750 milliGRAM(s) Oral every 8 hours    MEDICATIONS  (PRN):      Objective:  Vital Signs Last 24 Hrs  T(C): 37.7 (28 Oct 2019 05:30), Max: 37.7 (28 Oct 2019 01:34)  T(F): 99.8 (28 Oct 2019 05:30), Max: 99.9 (28 Oct 2019 01:34)  HR: 85 (28 Oct 2019 05:30) (68 - 98)  BP: 99/62 (28 Oct 2019 05:30) (97/60 - 113/79)  BP(mean): --  RR: 17 (28 Oct 2019 05:30) (16 - 18)  SpO2: 94% (28 Oct 2019 05:30) (94% - 98%)           I&O's Summary    PHYSICAL EXAM:  GENERAL: no acute distress, overweight, nonverbal, on nasal cannula, opens eyes with verbal and tactile stimulus. appears very alert, opens eyes looking around  HEAD: Atraumatic, Normocephalic  EYES: conjunctiva and sclera clear  ENMT: Dry mucous membranes  NECK: Supple  NERVOUS SYSTEM: Alert & Oriented X0, Unable to assess strength  CHEST/LUNG: no wheezing, faint rhonchorous breath sounds in anterior chest  HEART: Regular rate and rhythm; S1 and S2; No murmurs, rubs, or gallops  ABDOMEN: Soft, Nontender, Nondistended: Bowel sounds present, PEG tube present  EXTREMITIES: 2+ Peripheral Pulses    LABS:                                   7.9    5.70  )-----------( 107      ( 27 Oct 2019 08:20 )             26.0     10-27    142  |  100  |  25<H>  ----------------------------<  110<H>  3.7   |  31  |  0.75    Ca    8.1<L>      27 Oct 2019 05:28  Phos  3.0     10-27  Mg     2.3     10-27    TPro  6.5  /  Alb  2.3<L>  /  TBili  < 0.2<L>  /  DBili  x   /  AST  51<H>  /  ALT  13  /  AlkPhos  54  10-27                   CAPILLARY BLOOD GLUCOSE        RADIOLOGY & ADDITIONAL TESTS:  Imaging Personally Reviewed:  [x ] YES  [ ] NO  Consultants involved in case:   Consultant(s) Notes Reviewed:  [ x] YES  [ ] NO:   Care Discussed with Consultants/Other Providers [x ] YES  [ ] NO ***************************************************************  Brii Palacios PGY 1  Internal Medicine   Pager: [phone]  [facility] in house pager: [phone]  ***************************************************************    CC: Patient is a 54y old  Female who presents with a chief complaint of Sepsis (26 Oct 2019 09:47)    Subjective: Overnight the patient had one episode of hypotension to 99/62, but continued to by asymptomatic, no intervention at that time. Additionally the patient had been having several episodes of watery diarrhea and nursing had requested Flexiseal be placed. Otherwise this AM, the patient is non-verbal at baseline, but is able to grunt and show signs of discomfort if needed, but appears comfortable at this point. Other ROS cannot be obtained at this point.     MEDICATIONS  (STANDING):  acetaminophen    Suspension .. 650 milliGRAM(s) Oral every 6 hours  albuterol/ipratropium for Nebulization 3 milliLiter(s) Nebulizer every 6 hours  artificial tears (preservative free) Ophthalmic Solution 2 Drop(s) Both EYES two times a day  aspirin  chewable 81 milliGRAM(s) Oral daily  BACItracin   Ointment 1 Application(s) Topical two times a day  cloBAZam 10 milliGRAM(s) Oral two times a day  folic acid 1 milliGRAM(s) Oral daily  heparin  Injectable 5000 Unit(s) SubCutaneous every 8 hours  lacosamide Solution 100 milliGRAM(s) Oral <User Schedule>  levETIRAcetam  Solution 750 milliGRAM(s) Oral every 12 hours  methylPREDNISolone sodium succinate Injectable 40 milliGRAM(s) IV Push daily  multivitamin 1 Tablet(s) Oral daily  nystatin Powder 1 Application(s) Topical two times a day  OLANZapine 7.5 milliGRAM(s) Oral daily  piperacillin/tazobactam IVPB.. 3.375 Gram(s) IV Intermittent every 8 hours  polyethylene glycol 3350 17 Gram(s) Oral daily  senna Syrup 10 milliLiter(s) Oral at bedtime  valproic  acid Syrup 750 milliGRAM(s) Oral every 8 hours    MEDICATIONS  (PRN):      Objective:  Vital Signs Last 24 Hrs  T(C): 37.7 (28 Oct 2019 05:30), Max: 37.7 (28 Oct 2019 01:34)  T(F): 99.8 (28 Oct 2019 05:30), Max: 99.9 (28 Oct 2019 01:34)  HR: 85 (28 Oct 2019 05:30) (68 - 98)  BP: 99/62 (28 Oct 2019 05:30) (97/60 - 113/79)  BP(mean): --  RR: 17 (28 Oct 2019 05:30) (16 - 18)  SpO2: 94% (28 Oct 2019 05:30) (94% - 98%)           I&O's Summary    PHYSICAL EXAM:  GENERAL: no acute distress, overweight, nonverbal, on nasal cannula, opens eyes with verbal and tactile stimulus. appears very alert, opens eyes looking around  HEAD: Atraumatic, Normocephalic  EYES: conjunctiva and sclera clear without jaundice  NECK: Supple  NERVOUS SYSTEM: Alert & Oriented X0, Unable to assess strength  CHEST/LUNG: breathing comfortably on 3LNC, no perioral cyanosis, course, rhonchorous breath sounds   HEART: Regular rate and rhythm; S1 and S2; No murmurs, rubs, or gallops  ABDOMEN: Soft, Nontender, Nondistended: Bowel sounds present, PEG tube present, c/d/i  EXTREMITIES: non-pitting edema or dorsal aspect of b/l feet, discoloration of right posterior heel, which is dressed c/d/i with boots on b/l    LABS:                        8.8    3.90  )-----------( 115      ( 28 Oct 2019 07:34 )             28.8     10-28    142  |  101  |  24<H>  ----------------------------<  152<H>  4.3   |  32<H>  |  0.68    Ca    8.0<L>      28 Oct 2019 07:34  Phos  2.9     10-28  Mg     2.3     10-28    TPro  6.2  /  Alb  2.3<L>  /  TBili  < 0.2<L>  /  DBili  x   /  AST  38<H>  /  ALT  12  /  AlkPhos  55  10-28        RADIOLOGY & ADDITIONAL TESTS:  Imaging Personally Reviewed:  [x ] YES  [ ] NO  Consultants involved in case:   Consultant(s) Notes Reviewed:  [ x] YES  [ ] NO:   Care Discussed with Consultants/Other Providers [x ] YES  [ ] NO

## 2019-10-28 NOTE — PROGRESS NOTE ADULT - PROBLEM SELECTOR PLAN 2
-10/25, patient hypotensive to 80s/50s  -was given 2 L LR which improved pressure  -If hypotensive again, will bolus 500 cc. If no response, can start midodrine 10q8h.   -MICU was consulted on 10/25, aware of patient -10/25, patient hypotensive to 80s/50s  - was given 2 L LR which improved pressure  -If hypotensive again, will bolus 500 cc. If no response, can start midodrine 10q8h.   -MICU was consulted on 10/25, aware of patient

## 2019-10-28 NOTE — PROGRESS NOTE ADULT - PROBLEM SELECTOR PLAN 5
- Per family, patient has history of hypothermia and has been noted on previous admissions  - Patient was hypothermic on 10/25, however has been able to maintain adequate temperatures since receiving stress dose of steroids and having bear hugger - Per family, patient has history of hypothermia and has been noted on previous admissions  - maintaining adequate temps in the 97's off jose hugger  - will continue stress dosed steroids (day 4/5)

## 2019-10-29 LAB
ANION GAP SERPL CALC-SCNC: 11 MMO/L — SIGNIFICANT CHANGE UP (ref 7–14)
BACTERIA BLD CULT: SIGNIFICANT CHANGE UP
BACTERIA BLD CULT: SIGNIFICANT CHANGE UP
BASE EXCESS BLDV CALC-SCNC: 12.9 MMOL/L — SIGNIFICANT CHANGE UP
BUN SERPL-MCNC: 24 MG/DL — HIGH (ref 7–23)
CALCIUM SERPL-MCNC: 8 MG/DL — LOW (ref 8.4–10.5)
CHLORIDE SERPL-SCNC: 101 MMOL/L — SIGNIFICANT CHANGE UP (ref 98–107)
CO2 SERPL-SCNC: 31 MMOL/L — SIGNIFICANT CHANGE UP (ref 22–31)
CREAT SERPL-MCNC: 0.62 MG/DL — SIGNIFICANT CHANGE UP (ref 0.5–1.3)
GAS PNL BLDV: 142 MMOL/L — SIGNIFICANT CHANGE UP (ref 136–146)
GLUCOSE BLDV-MCNC: 113 MG/DL — HIGH (ref 70–99)
GLUCOSE SERPL-MCNC: 116 MG/DL — HIGH (ref 70–99)
HCO3 BLDV-SCNC: 36 MMOL/L — HIGH (ref 20–27)
HCT VFR BLD CALC: 28.8 % — LOW (ref 34.5–45)
HCT VFR BLDV CALC: 23.3 % — LOW (ref 34.5–45)
HGB BLD-MCNC: 8.8 G/DL — LOW (ref 11.5–15.5)
HGB BLDV-MCNC: 7.5 G/DL — LOW (ref 11.5–15.5)
MAGNESIUM SERPL-MCNC: 2.5 MG/DL — SIGNIFICANT CHANGE UP (ref 1.6–2.6)
MCHC RBC-ENTMCNC: 30.6 % — LOW (ref 32–36)
MCHC RBC-ENTMCNC: 34.4 PG — HIGH (ref 27–34)
MCV RBC AUTO: 112.5 FL — HIGH (ref 80–100)
NRBC # FLD: 0.05 K/UL — SIGNIFICANT CHANGE UP (ref 0–0)
PCO2 BLDV: 65 MMHG — HIGH (ref 41–51)
PH BLDV: 7.39 PH — SIGNIFICANT CHANGE UP (ref 7.32–7.43)
PHOSPHATE SERPL-MCNC: 2.9 MG/DL — SIGNIFICANT CHANGE UP (ref 2.5–4.5)
PLATELET # BLD AUTO: 138 K/UL — LOW (ref 150–400)
PMV BLD: 9.5 FL — SIGNIFICANT CHANGE UP (ref 7–13)
PO2 BLDV: 142 MMHG — HIGH (ref 35–40)
POTASSIUM BLDV-SCNC: 3.4 MMOL/L — SIGNIFICANT CHANGE UP (ref 3.4–4.5)
POTASSIUM SERPL-MCNC: 4.3 MMOL/L — SIGNIFICANT CHANGE UP (ref 3.5–5.3)
POTASSIUM SERPL-SCNC: 4.3 MMOL/L — SIGNIFICANT CHANGE UP (ref 3.5–5.3)
RBC # BLD: 2.56 M/UL — LOW (ref 3.8–5.2)
RBC # FLD: 15.6 % — HIGH (ref 10.3–14.5)
SAO2 % BLDV: 99.3 % — HIGH (ref 60–85)
SODIUM SERPL-SCNC: 143 MMOL/L — SIGNIFICANT CHANGE UP (ref 135–145)
VANCOMYCIN TROUGH SERPL-MCNC: 27.9 UG/ML — CRITICAL HIGH (ref 10–20)
WBC # BLD: 5.89 K/UL — SIGNIFICANT CHANGE UP (ref 3.8–10.5)
WBC # FLD AUTO: 5.89 K/UL — SIGNIFICANT CHANGE UP (ref 3.8–10.5)

## 2019-10-29 PROCEDURE — 99233 SBSQ HOSP IP/OBS HIGH 50: CPT | Mod: GC

## 2019-10-29 RX ADMIN — HEPARIN SODIUM 5000 UNIT(S): 5000 INJECTION INTRAVENOUS; SUBCUTANEOUS at 22:23

## 2019-10-29 RX ADMIN — Medication 81 MILLIGRAM(S): at 11:28

## 2019-10-29 RX ADMIN — Medication 750 MILLIGRAM(S): at 09:56

## 2019-10-29 RX ADMIN — LACOSAMIDE 100 MILLIGRAM(S): 50 TABLET ORAL at 06:18

## 2019-10-29 RX ADMIN — Medication 2 DROP(S): at 17:40

## 2019-10-29 RX ADMIN — Medication 650 MILLIGRAM(S): at 09:57

## 2019-10-29 RX ADMIN — Medication 1 TABLET(S): at 11:28

## 2019-10-29 RX ADMIN — Medication 2 DROP(S): at 06:18

## 2019-10-29 RX ADMIN — Medication 1 MILLIGRAM(S): at 11:28

## 2019-10-29 RX ADMIN — PIPERACILLIN AND TAZOBACTAM 25 GRAM(S): 4; .5 INJECTION, POWDER, LYOPHILIZED, FOR SOLUTION INTRAVENOUS at 13:15

## 2019-10-29 RX ADMIN — Medication 40 MILLIGRAM(S): at 06:20

## 2019-10-29 RX ADMIN — NYSTATIN CREAM 1 APPLICATION(S): 100000 CREAM TOPICAL at 17:42

## 2019-10-29 RX ADMIN — OLANZAPINE 7.5 MILLIGRAM(S): 15 TABLET, FILM COATED ORAL at 11:28

## 2019-10-29 RX ADMIN — Medication 650 MILLIGRAM(S): at 13:15

## 2019-10-29 RX ADMIN — Medication 750 MILLIGRAM(S): at 00:30

## 2019-10-29 RX ADMIN — Medication 750 MILLIGRAM(S): at 17:40

## 2019-10-29 RX ADMIN — Medication 1 APPLICATION(S): at 17:41

## 2019-10-29 RX ADMIN — CLOBAZAM 10 MILLIGRAM(S): 10 TABLET ORAL at 17:40

## 2019-10-29 RX ADMIN — Medication 650 MILLIGRAM(S): at 22:23

## 2019-10-29 RX ADMIN — LACOSAMIDE 100 MILLIGRAM(S): 50 TABLET ORAL at 11:34

## 2019-10-29 RX ADMIN — Medication 650 MILLIGRAM(S): at 06:04

## 2019-10-29 RX ADMIN — Medication 1 APPLICATION(S): at 06:19

## 2019-10-29 RX ADMIN — LEVETIRACETAM 750 MILLIGRAM(S): 250 TABLET, FILM COATED ORAL at 18:53

## 2019-10-29 RX ADMIN — Medication 750 MILLIGRAM(S): at 23:39

## 2019-10-29 RX ADMIN — PIPERACILLIN AND TAZOBACTAM 25 GRAM(S): 4; .5 INJECTION, POWDER, LYOPHILIZED, FOR SOLUTION INTRAVENOUS at 06:20

## 2019-10-29 RX ADMIN — HEPARIN SODIUM 5000 UNIT(S): 5000 INJECTION INTRAVENOUS; SUBCUTANEOUS at 06:20

## 2019-10-29 RX ADMIN — LACOSAMIDE 100 MILLIGRAM(S): 50 TABLET ORAL at 17:40

## 2019-10-29 RX ADMIN — HEPARIN SODIUM 5000 UNIT(S): 5000 INJECTION INTRAVENOUS; SUBCUTANEOUS at 11:27

## 2019-10-29 RX ADMIN — CLOBAZAM 10 MILLIGRAM(S): 10 TABLET ORAL at 06:20

## 2019-10-29 RX ADMIN — PIPERACILLIN AND TAZOBACTAM 25 GRAM(S): 4; .5 INJECTION, POWDER, LYOPHILIZED, FOR SOLUTION INTRAVENOUS at 22:23

## 2019-10-29 RX ADMIN — NYSTATIN CREAM 1 APPLICATION(S): 100000 CREAM TOPICAL at 06:19

## 2019-10-29 RX ADMIN — LEVETIRACETAM 750 MILLIGRAM(S): 250 TABLET, FILM COATED ORAL at 06:18

## 2019-10-29 NOTE — PROGRESS NOTE ADULT - PROBLEM SELECTOR PLAN 6
- c/w home meds  - no seizure activity since admission -DVT: sqh q8h  -Diet: NPO except tube feeds  -Dispo: pending clinical improvement  -GOC: Full code, MOLST in chart  -Mother (Marine Ponce [phone]), HCP contact and updated  -PMD Dr. Shwetha Yanez ([phone])

## 2019-10-29 NOTE — PROGRESS NOTE ADULT - SUBJECTIVE AND OBJECTIVE BOX
Patient is a 54y old  Female who presents with a chief complaint of Sepsis (29 Oct 2019 06:48)       INTERVAL HPI/OVERNIGHT EVENTS  Patient seen and evaluated at bedside.  Pt is resting comfortable in NAD. Denies N/V/F/C.      Allergies    Topamax (Unknown)    Intolerances        Vital Signs Last 24 Hrs  T(C): 36.6 (29 Oct 2019 08:15), Max: 36.6 (29 Oct 2019 08:15)  T(F): 97.8 (29 Oct 2019 08:15), Max: 97.8 (29 Oct 2019 08:15)  HR: 72 (29 Oct 2019 08:15) (61 - 79)  BP: 97/69 (29 Oct 2019 08:15) (92/67 - 123/78)  BP(mean): --  RR: 19 (29 Oct 2019 08:15) (18 - 19)  SpO2: 92% (29 Oct 2019 08:15) (92% - 100%)    LABS:                        8.8    5.89  )-----------( 138      ( 29 Oct 2019 03:00 )             28.8     10-29    143  |  101  |  24<H>  ----------------------------<  116<H>  4.3   |  31  |  0.62    Ca    8.0<L>      29 Oct 2019 03:00  Phos  2.9     10-29  Mg     2.5     10-29    TPro  6.2  /  Alb  2.3<L>  /  TBili  < 0.2<L>  /  DBili  x   /  AST  38<H>  /  ALT  12  /  AlkPhos  55  10-28        CAPILLARY BLOOD GLUCOSE          Lower Extremity Physical Exam:  Vasc: DP/PT 2/4, TG warm to cool, CFT < 3 seconds   Neuro: Epicritic sensation intact to digits b/l  MSK: quadriplegic and contracted LE  Derm:  R posterior heel deep tissue injury down to dermis, ecchymotic, mild periwound erythema, scant serous drainage, no purulence, no crepitation, no malodor    RADIOLOGY & ADDITIONAL TESTS:    < from: Xray Foot AP + Lateral + Oblique, Right (10.25.19 @ 18:14) >    EXAM:  RAD FOOT MIN 3 VIEWS RIGHT        PROCEDURE DATE:  Oct 25 2019         INTERPRETATION:  CLINICAL INDICATION: right plantar heel deep tissue   injury    EXAM:  Single oblique view of the right foot from 10/25/2019 at 1814.    Additional viewscould not be obtained due to patient contracted status.    IMPRESSION:  Intact appearing distal end of fibular fracture fixation plate with   cortical and syndesmotic screws. Underlying anatomic alignment grossly   maintained.    Generalized soft tissue swelling. No tracking gas collections or gross   radiographic evidence for osteomyelitis particularly in heel region.    Chronic mild irregular spurring along medial malleolar margin. No   dislocations or acute appearing fractures.    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 Preserved remaining joint spaces.    Generalized osteopenia otherwise no discrete lytic or blastic lesions.                  DEAN WALLACE M.D., ATTENDING RADIOLOGIST  This document has been electronically signed. Oct 25 2019  6:20PM        < end of copied text >

## 2019-10-29 NOTE — PROGRESS NOTE ADULT - ASSESSMENT
54F w/ CP and seizures, presenting for fevers, tachycardia, and hypoxia to 80s w/ recent hospitalization requiring MICU pressor support, intubation for Pseudomonas and MRSA PNA, now w/ PEG tube, now admitted for sepsis 2/2 parainfluenza virus and proteus UTI. Currently on BS abx given increased risk for HAP and aspiration PNA. HC c/b hypothermia and hypotension, now on solumedrol, duonebs ATC and IVF. Hypothermia has now resolved, and the patient is currently stable with SBP in the low 100s. 54F w/ CP and seizures, presenting for fevers, tachycardia, and hypoxia to 80s w/ recent hospitalization requiring MICU pressor support, intubation for Pseudomonas and MRSA PNA, now w/ PEG tube, now admitted for sepsis 2/2 parainfluenza virus and proteus UTI. Currently on BS abx given increased risk for HAP and aspiration PNA. HC c/b hypothermia and hypotension, now on solumedrol, duonebs ATC and IVF. Hypothermia has now resolved, and the patient is currently stable with SBP in the low 100s. Course also complicated by hypoxia requiring NC.

## 2019-10-29 NOTE — PROGRESS NOTE ADULT - PROBLEM SELECTOR PLAN 1
- initially p/w fever, tachycardia, leukocytosis  - s/p ceftriaxone and azithro in ED  - Vanc and zosyn- day 6/7 of abx today  - vanc level was 27.9, held dosage overnight  - RVP + parainfluenza virus  - c/w gentle IV hydration

## 2019-10-29 NOTE — PROGRESS NOTE ADULT - SUBJECTIVE AND OBJECTIVE BOX
***************************************************************  Brii Palacios PGY 1  Internal Medicine   Pager: [phone]  [facility] in house pager: [phone]  ***************************************************************    CC: Patient is a 54y old  Female who presents with a chief complaint of Sepsis (26 Oct 2019 09:47)    Subjective: No acute events overnight, last night vanco level came back at 27.9 so NF held 3AM vancomycin dose.     MEDICATIONS  (STANDING):  acetaminophen    Suspension .. 650 milliGRAM(s) Oral every 6 hours  albuterol/ipratropium for Nebulization 3 milliLiter(s) Nebulizer every 6 hours  artificial tears (preservative free) Ophthalmic Solution 2 Drop(s) Both EYES two times a day  aspirin  chewable 81 milliGRAM(s) Oral daily  BACItracin   Ointment 1 Application(s) Topical two times a day  cloBAZam 10 milliGRAM(s) Oral two times a day  folic acid 1 milliGRAM(s) Oral daily  heparin  Injectable 5000 Unit(s) SubCutaneous every 8 hours  lacosamide Solution 100 milliGRAM(s) Oral <User Schedule>  levETIRAcetam  Solution 750 milliGRAM(s) Oral every 12 hours  methylPREDNISolone sodium succinate Injectable 40 milliGRAM(s) IV Push daily  multivitamin 1 Tablet(s) Oral daily  nystatin Powder 1 Application(s) Topical two times a day  OLANZapine 7.5 milliGRAM(s) Oral daily  piperacillin/tazobactam IVPB.. 3.375 Gram(s) IV Intermittent every 8 hours  polyethylene glycol 3350 17 Gram(s) Oral daily  senna Syrup 10 milliLiter(s) Oral at bedtime  valproic  acid Syrup 750 milliGRAM(s) Oral every 8 hours    MEDICATIONS  (PRN):      Objective:  Vital Signs Last 24 Hrs  T(C): 36.3 (29 Oct 2019 06:22), Max: 36.5 (28 Oct 2019 11:17)  T(F): 97.4 (29 Oct 2019 06:22), Max: 97.7 (28 Oct 2019 11:17)  HR: 64 (29 Oct 2019 06:22) (61 - 79)  BP: 100/68 (29 Oct 2019 06:22) (92/67 - 123/78)  BP(mean): --  RR: 18 (29 Oct 2019 06:22) (18 - 19)  SpO2: 94% (29 Oct 2019 06:22) (94% - 100%)          I&O's Summary    PHYSICAL EXAM:  GENERAL: no acute distress, overweight, nonverbal, on nasal cannula, opens eyes with verbal and tactile stimulus. appears very alert, opens eyes looking around  HEAD: Atraumatic, Normocephalic  EYES: conjunctiva and sclera clear without jaundice  NECK: Supple  NERVOUS SYSTEM: Alert & Oriented X0, Unable to assess strength  CHEST/LUNG: breathing comfortably on 3LNC, no perioral cyanosis, course, rhonchorous breath sounds   HEART: Regular rate and rhythm; S1 and S2; No murmurs, rubs, or gallops  ABDOMEN: Soft, Nontender, Nondistended: Bowel sounds present, PEG tube present, c/d/i  EXTREMITIES: non-pitting edema or dorsal aspect of b/l feet, discoloration of right posterior heel, which is dressed c/d/i with boots on b/l    LABS:                        8.8    3.90  )-----------( 115      ( 28 Oct 2019 07:34 )             28.8     10-28    142  |  101  |  24<H>  ----------------------------<  152<H>  4.3   |  32<H>  |  0.68    Ca    8.0<L>      28 Oct 2019 07:34  Phos  2.9     10-28  Mg     2.3     10-28    TPro  6.2  /  Alb  2.3<L>  /  TBili  < 0.2<L>  /  DBili  x   /  AST  38<H>  /  ALT  12  /  AlkPhos  55  10-28        RADIOLOGY & ADDITIONAL TESTS:  Imaging Personally Reviewed:  [x ] YES  [ ] NO  Consultants involved in case:   Consultant(s) Notes Reviewed:  [ x] YES  [ ] NO:   Care Discussed with Consultants/Other Providers [x ] YES  [ ] NO ***************************************************************  Brii Palacios PGY 1  Internal Medicine   Pager: [phone]  [facility] in house pager: [phone]  ***************************************************************    CC: Patient is a 54y old  Female who presents with a chief complaint of Sepsis (26 Oct 2019 09:47)    Subjective: No acute events overnight, last night vanco level came back at 27.9 so NF held 3AM vancomycin dose. Otherwise patient non verbal at baseline. Nurse states that she is more lethargic after administering anti-epileptic medications. She is satting well on 3L NC, but when breathing on RA, she will desat to about 85-88%. Otherwise ROS not able to be assessed at this point.     MEDICATIONS  (STANDING):  acetaminophen    Suspension .. 650 milliGRAM(s) Oral every 6 hours  albuterol/ipratropium for Nebulization 3 milliLiter(s) Nebulizer every 6 hours  artificial tears (preservative free) Ophthalmic Solution 2 Drop(s) Both EYES two times a day  aspirin  chewable 81 milliGRAM(s) Oral daily  BACItracin   Ointment 1 Application(s) Topical two times a day  cloBAZam 10 milliGRAM(s) Oral two times a day  folic acid 1 milliGRAM(s) Oral daily  heparin  Injectable 5000 Unit(s) SubCutaneous every 8 hours  lacosamide Solution 100 milliGRAM(s) Oral <User Schedule>  levETIRAcetam  Solution 750 milliGRAM(s) Oral every 12 hours  methylPREDNISolone sodium succinate Injectable 40 milliGRAM(s) IV Push daily  multivitamin 1 Tablet(s) Oral daily  nystatin Powder 1 Application(s) Topical two times a day  OLANZapine 7.5 milliGRAM(s) Oral daily  piperacillin/tazobactam IVPB.. 3.375 Gram(s) IV Intermittent every 8 hours  polyethylene glycol 3350 17 Gram(s) Oral daily  senna Syrup 10 milliLiter(s) Oral at bedtime  valproic  acid Syrup 750 milliGRAM(s) Oral every 8 hours    MEDICATIONS  (PRN):      Objective:  Vital Signs Last 24 Hrs  T(C): 36.3 (29 Oct 2019 06:22), Max: 36.5 (28 Oct 2019 11:17)  T(F): 97.4 (29 Oct 2019 06:22), Max: 97.7 (28 Oct 2019 11:17)  HR: 64 (29 Oct 2019 06:22) (61 - 79)  BP: 100/68 (29 Oct 2019 06:22) (92/67 - 123/78)  BP(mean): --  RR: 18 (29 Oct 2019 06:22) (18 - 19)  SpO2: 94% (29 Oct 2019 06:22) (94% - 100%)          I&O's Summary    PHYSICAL EXAM:  GENERAL: no acute distress, overweight, nonverbal, on nasal cannula  HEAD: Atraumatic, Normocephalic  EYES: conjunctiva and sclera clear without jaundice  NECK: Supple  NERVOUS SYSTEM: Alert & Oriented X0, Unable to assess strength  CHEST/LUNG: breathing comfortably on 3LNC, no perioral cyanosis, course, rhonchorous breath sounds   HEART: Regular rate and rhythm; S1 and S2; No murmurs, rubs, or gallops  ABDOMEN: Soft, Nontender, Nondistended: Bowel sounds present, PEG tube present, c/d/i  EXTREMITIES: non-pitting edema or dorsal aspect of b/l feet, discoloration of right posterior heel, which is dressed c/d/i with boots on b/l    LABS:                                   8.8    5.89  )-----------( 138      ( 29 Oct 2019 03:00 )             28.8       10-29    143  |  101  |  24<H>  ----------------------------<  116<H>  4.3   |  31  |  0.62    Ca    8.0<L>      29 Oct 2019 03:00  Phos  2.9     10-29  Mg     2.5     10-29    TPro  6.2  /  Alb  2.3<L>  /  TBili  < 0.2<L>  /  DBili  x   /  AST  38<H>  /  ALT  12  /  AlkPhos  55  10-28        RADIOLOGY & ADDITIONAL TESTS:  Imaging Personally Reviewed:  [x ] YES  [ ] NO  Consultants involved in case:   Consultant(s) Notes Reviewed:  [ x] YES  [ ] NO:   Care Discussed with Consultants/Other Providers [x ] YES  [ ] NO

## 2019-10-29 NOTE — PROGRESS NOTE ADULT - ATTENDING COMMENTS
continue to off-load.  no acute podiatric intervention at this time, please reconsult if needed if there are any changes to the wound.

## 2019-10-29 NOTE — PROGRESS NOTE ADULT - PROBLEM SELECTOR PLAN 5
- Per family, patient has history of hypothermia and has been noted on previous admissions  - maintaining adequate temps in the 97's off jose hugger  - will continue stress dosed steroids (day 5/5) - c/w home meds  - no seizure activity since admission  - will chart check patient for neuro recommendations or recent changes in neurologic medications since the patient is very drowsy  - will stagger antiepileptic medication times so that she is more awake

## 2019-10-29 NOTE — PROGRESS NOTE ADULT - PROBLEM SELECTOR PLAN 4
- UA +; UCx + proteus, pansensitive  - c/w  zosyn (day 6/7) - Per family, patient has history of hypothermia and has been noted on previous admissions  - maintaining adequate temps in the 97's off jose hugger  - will continue stress dosed steroids (day 5/5)

## 2019-10-29 NOTE — PROGRESS NOTE ADULT - PROBLEM SELECTOR PLAN 2
-10/25, patient hypotensive to 80s/50s  - was given 2 L LR which improved pressure  -If hypotensive again, will bolus 500 cc. If no response, can start midodrine 10q8h.   -MICU was consulted on 10/25, aware of patient - was hypoxic to 80s at Nursing home  - c/w Nasal Cannula, could not be weaned today, will continue to monitor if the patient is not awake enough to be taking deep breaths secondary to anti-epileptics  - CXR showed no acute process, RVP positive for parainfluenza   - c/w Duonebs q6h standing

## 2019-10-29 NOTE — PROGRESS NOTE ADULT - PROBLEM SELECTOR PLAN 3
- was hypoxic to 80s at Nursing home  - c/w Nasal Cannula, will attempt to wean (satting in the mid 90s)  - CXR showed no acute process, RVP positive for parainfluenza   - c/w Duonebs q6h standing - UA +; UCx + proteus, pansensitive  - c/w  zosyn (day 6/7)

## 2019-10-29 NOTE — PROGRESS NOTE ADULT - ATTENDING COMMENTS
Patient seen and examined. Agree with above note by resident.    #Parainfluenza infection with superimposed bacterial process - on vanc/zosyn day 6/7. Clinically improving. Remains hypoxic on RA. Hold further vanco as trough is high.   #Encephalopathy - patient appears more drowsy per nursing. Would stagger anti-epileptic meds to assess if mentation improves.   #Hypoxic respiratory state - titrate off 02 as tolerated. C/w pulmonary suctioning   #Proteus UTI - on zosyn day 6/7    Plan discussed with RN and HS .

## 2019-10-29 NOTE — PROGRESS NOTE ADULT - ASSESSMENT
53 y/o F w/ R posterior heel wound:  - pt seen in evaluated  - Vitals reassuring, no leukocytosis  - R posterior heel deep tissue injury down to dermis/ superficial, ecchymotic, mild periwound erythema, serous drainage, no purulence, no crepitation, no malodor   - stable, with no acute signs of infection  - Right foot xray reviewed showing no osteo, no gas. Internal hardware to the ankle  - no acute pod sx intervention at this time  - strict offloading to posterior heels w/ Z-Flows at all times  - Podiatry signing off; reconsult as needed  - Seen w/ attending

## 2019-10-29 NOTE — PROGRESS NOTE ADULT - PROBLEM SELECTOR PLAN 7
-DVT: sqh q8h  -Diet: NPO except tube feeds  -Dispo: pending clinical improvement  -GOC: Full code, MOLST in chart  -Mother (Marine Ponce [phone]), HCP contact and updated  -PMD Dr. Shwetha Yanez ([phone])
-DVT: sqh q8h  -Diet: NPO except tube feeds  -Dispo: pending clinical improvement  -GOC: Full code, MOLST in chart  -Mother (Marine Ponce [phone]), HCP contact and updated  -PMD Dr. Shwetha Yanez ([phone])
-DVT: sqh q8h  -Diet: NPO except tube feeds  -Dispo: pending clinical improvement  -GOC: Full code, MOLST in chart  -Mother (Marine Ponce [phone]), HCP contact and updated  -PMD Dr. Shwetha Yanez ([phone])
-DVT: sqh q8h  -Diet: NPO except tube feeds  -Dispo: pending clinical improvement  -GOC: Full code, MOLST in chart  -Mother (Marine Ponce [phone]) who may be HCP contact and updated  -PMD Dr. Shwetha Yanez ([phone])

## 2019-10-30 LAB
ANION GAP SERPL CALC-SCNC: 9 MMO/L — SIGNIFICANT CHANGE UP (ref 7–14)
BUN SERPL-MCNC: 25 MG/DL — HIGH (ref 7–23)
CALCIUM SERPL-MCNC: 8.1 MG/DL — LOW (ref 8.4–10.5)
CHLORIDE SERPL-SCNC: 102 MMOL/L — SIGNIFICANT CHANGE UP (ref 98–107)
CO2 SERPL-SCNC: 33 MMOL/L — HIGH (ref 22–31)
CREAT SERPL-MCNC: 0.57 MG/DL — SIGNIFICANT CHANGE UP (ref 0.5–1.3)
GLUCOSE SERPL-MCNC: 97 MG/DL — SIGNIFICANT CHANGE UP (ref 70–99)
HCT VFR BLD CALC: 31.1 % — LOW (ref 34.5–45)
HGB BLD-MCNC: 9.4 G/DL — LOW (ref 11.5–15.5)
MAGNESIUM SERPL-MCNC: 2.3 MG/DL — SIGNIFICANT CHANGE UP (ref 1.6–2.6)
MCHC RBC-ENTMCNC: 30.2 % — LOW (ref 32–36)
MCHC RBC-ENTMCNC: 34.3 PG — HIGH (ref 27–34)
MCV RBC AUTO: 113.5 FL — HIGH (ref 80–100)
NRBC # FLD: 0.06 K/UL — SIGNIFICANT CHANGE UP (ref 0–0)
NRBC FLD-RTO: 1.1 — SIGNIFICANT CHANGE UP
PHOSPHATE SERPL-MCNC: 3.4 MG/DL — SIGNIFICANT CHANGE UP (ref 2.5–4.5)
PLATELET # BLD AUTO: 151 K/UL — SIGNIFICANT CHANGE UP (ref 150–400)
PMV BLD: 8.9 FL — SIGNIFICANT CHANGE UP (ref 7–13)
POTASSIUM SERPL-MCNC: 4.1 MMOL/L — SIGNIFICANT CHANGE UP (ref 3.5–5.3)
POTASSIUM SERPL-SCNC: 4.1 MMOL/L — SIGNIFICANT CHANGE UP (ref 3.5–5.3)
RBC # BLD: 2.74 M/UL — LOW (ref 3.8–5.2)
RBC # FLD: 15.7 % — HIGH (ref 10.3–14.5)
SODIUM SERPL-SCNC: 144 MMOL/L — SIGNIFICANT CHANGE UP (ref 135–145)
WBC # BLD: 5.63 K/UL — SIGNIFICANT CHANGE UP (ref 3.8–10.5)
WBC # FLD AUTO: 5.63 K/UL — SIGNIFICANT CHANGE UP (ref 3.8–10.5)

## 2019-10-30 PROCEDURE — 99233 SBSQ HOSP IP/OBS HIGH 50: CPT | Mod: GC

## 2019-10-30 RX ORDER — VALPROIC ACID (AS SODIUM SALT) 250 MG/5ML
750 SOLUTION, ORAL ORAL
Refills: 0 | Status: DISCONTINUED | OUTPATIENT
Start: 2019-10-30 | End: 2019-11-04

## 2019-10-30 RX ORDER — ACETYLCYSTEINE 200 MG/ML
4 VIAL (ML) MISCELLANEOUS THREE TIMES A DAY
Refills: 0 | Status: COMPLETED | OUTPATIENT
Start: 2019-10-30 | End: 2019-10-31

## 2019-10-30 RX ORDER — ACETYLCYSTEINE 200 MG/ML
4 VIAL (ML) MISCELLANEOUS THREE TIMES A DAY
Refills: 0 | Status: DISCONTINUED | OUTPATIENT
Start: 2019-10-30 | End: 2019-10-30

## 2019-10-30 RX ORDER — LEVETIRACETAM 250 MG/1
750 TABLET, FILM COATED ORAL
Refills: 0 | Status: DISCONTINUED | OUTPATIENT
Start: 2019-10-30 | End: 2019-11-04

## 2019-10-30 RX ORDER — IPRATROPIUM/ALBUTEROL SULFATE 18-103MCG
3 AEROSOL WITH ADAPTER (GRAM) INHALATION THREE TIMES A DAY
Refills: 0 | Status: COMPLETED | OUTPATIENT
Start: 2019-10-30 | End: 2019-10-31

## 2019-10-30 RX ADMIN — LEVETIRACETAM 750 MILLIGRAM(S): 250 TABLET, FILM COATED ORAL at 12:05

## 2019-10-30 RX ADMIN — Medication 3 MILLILITER(S): at 21:20

## 2019-10-30 RX ADMIN — Medication 4 MILLILITER(S): at 21:35

## 2019-10-30 RX ADMIN — NYSTATIN CREAM 1 APPLICATION(S): 100000 CREAM TOPICAL at 17:16

## 2019-10-30 RX ADMIN — Medication 2 DROP(S): at 17:16

## 2019-10-30 RX ADMIN — Medication 1 APPLICATION(S): at 17:17

## 2019-10-30 RX ADMIN — Medication 1 MILLIGRAM(S): at 12:05

## 2019-10-30 RX ADMIN — NYSTATIN CREAM 1 APPLICATION(S): 100000 CREAM TOPICAL at 06:24

## 2019-10-30 RX ADMIN — CLOBAZAM 10 MILLIGRAM(S): 10 TABLET ORAL at 06:32

## 2019-10-30 RX ADMIN — Medication 650 MILLIGRAM(S): at 21:09

## 2019-10-30 RX ADMIN — CLOBAZAM 10 MILLIGRAM(S): 10 TABLET ORAL at 17:15

## 2019-10-30 RX ADMIN — Medication 750 MILLIGRAM(S): at 17:15

## 2019-10-30 RX ADMIN — PIPERACILLIN AND TAZOBACTAM 25 GRAM(S): 4; .5 INJECTION, POWDER, LYOPHILIZED, FOR SOLUTION INTRAVENOUS at 06:22

## 2019-10-30 RX ADMIN — Medication 1 APPLICATION(S): at 06:24

## 2019-10-30 RX ADMIN — Medication 40 MILLIGRAM(S): at 06:22

## 2019-10-30 RX ADMIN — HEPARIN SODIUM 5000 UNIT(S): 5000 INJECTION INTRAVENOUS; SUBCUTANEOUS at 23:45

## 2019-10-30 RX ADMIN — Medication 81 MILLIGRAM(S): at 12:05

## 2019-10-30 RX ADMIN — Medication 4 MILLILITER(S): at 15:35

## 2019-10-30 RX ADMIN — Medication 750 MILLIGRAM(S): at 23:43

## 2019-10-30 RX ADMIN — Medication 1 TABLET(S): at 12:05

## 2019-10-30 RX ADMIN — LACOSAMIDE 100 MILLIGRAM(S): 50 TABLET ORAL at 13:10

## 2019-10-30 RX ADMIN — Medication 2 DROP(S): at 06:21

## 2019-10-30 RX ADMIN — LEVETIRACETAM 750 MILLIGRAM(S): 250 TABLET, FILM COATED ORAL at 07:34

## 2019-10-30 RX ADMIN — LACOSAMIDE 100 MILLIGRAM(S): 50 TABLET ORAL at 17:15

## 2019-10-30 RX ADMIN — HEPARIN SODIUM 5000 UNIT(S): 5000 INJECTION INTRAVENOUS; SUBCUTANEOUS at 13:10

## 2019-10-30 RX ADMIN — Medication 650 MILLIGRAM(S): at 17:15

## 2019-10-30 RX ADMIN — LACOSAMIDE 100 MILLIGRAM(S): 50 TABLET ORAL at 06:32

## 2019-10-30 RX ADMIN — Medication 3 MILLILITER(S): at 15:35

## 2019-10-30 RX ADMIN — Medication 650 MILLIGRAM(S): at 02:51

## 2019-10-30 RX ADMIN — OLANZAPINE 7.5 MILLIGRAM(S): 15 TABLET, FILM COATED ORAL at 12:05

## 2019-10-30 RX ADMIN — Medication 650 MILLIGRAM(S): at 08:34

## 2019-10-30 RX ADMIN — Medication 750 MILLIGRAM(S): at 08:34

## 2019-10-30 RX ADMIN — LEVETIRACETAM 750 MILLIGRAM(S): 250 TABLET, FILM COATED ORAL at 23:44

## 2019-10-30 RX ADMIN — HEPARIN SODIUM 5000 UNIT(S): 5000 INJECTION INTRAVENOUS; SUBCUTANEOUS at 06:24

## 2019-10-30 NOTE — PROGRESS NOTE ADULT - PROBLEM SELECTOR PLAN 6
-DVT: sqh q8h  -Diet: NPO except tube feeds  -Dispo: pending clinical improvement  -GOC: Full code, MOLST in chart  -Mother (Marine Ponce [phone]), HCP contact and updated  -PMD Dr. Shwetah Yanez ([phone])

## 2019-10-30 NOTE — PROGRESS NOTE ADULT - PROBLEM SELECTOR PLAN 3
- UA +; UCx + proteus, pansensitive  - c/w  zosyn (day 6/7) - now resolved  - initially p/w fever, tachycardia, leukocytosis  - s/p ceftriaxone and azithro in ED  - Vanc and zosyn- day 7/7 of abx today  - RVP + parainfluenza virus  - c/w gentle IV hydration

## 2019-10-30 NOTE — PROGRESS NOTE ADULT - PROBLEM SELECTOR PLAN 2
- was hypoxic to 80s at Nursing home  - c/w Nasal Cannula, could not be weaned today, will continue to monitor if the patient is not awake enough to be taking deep breaths secondary to anti-epileptics  - CXR showed no acute process, RVP positive for parainfluenza   - c/w Duonebs q6h standing - UA +; UCx + proteus, pansensitive  - c/w  zosyn (day 7/7)

## 2019-10-30 NOTE — CHART NOTE - NSCHARTNOTEFT_GEN_A_CORE
Writer called for hypothermia. VS: T 93F, HR 52, /49, O2 sat 100%, RR 19. Pt seen at bedside. Pt was awake and alert - nonverbal at baseline. On exam, her skin is cool to touch and bradycardic. Lungs are clear to ascultation. Abd was soft, nondistended. Per chart review, pt finished 7-day course of vanc/zosyn today. Will hold off on giving more antibiotics for now as vanc trough from yesterday (10/29) was supratherapeutic and pt received antibiotics earlier today. Hypothermic blanket ordered, will reassess vital signs and monitor closely.

## 2019-10-30 NOTE — PROGRESS NOTE ADULT - SUBJECTIVE AND OBJECTIVE BOX
***************************************************************  Brii Palacios PGY 1  Internal Medicine   Pager: [phone]  [facility] in house pager: [phone]  ***************************************************************    CC: Patient is a 54y old  Female who presents with a chief complaint of Sepsis (26 Oct 2019 09:47)    Subjective:    MEDICATIONS  (STANDING):  acetaminophen    Suspension .. 650 milliGRAM(s) Oral every 6 hours  albuterol/ipratropium for Nebulization 3 milliLiter(s) Nebulizer every 6 hours  artificial tears (preservative free) Ophthalmic Solution 2 Drop(s) Both EYES two times a day  aspirin  chewable 81 milliGRAM(s) Oral daily  BACItracin   Ointment 1 Application(s) Topical two times a day  cloBAZam 10 milliGRAM(s) Oral two times a day  folic acid 1 milliGRAM(s) Oral daily  heparin  Injectable 5000 Unit(s) SubCutaneous every 8 hours  lacosamide Solution 100 milliGRAM(s) Oral <User Schedule>  levETIRAcetam  Solution 750 milliGRAM(s) Oral every 12 hours  methylPREDNISolone sodium succinate Injectable 40 milliGRAM(s) IV Push daily  multivitamin 1 Tablet(s) Oral daily  nystatin Powder 1 Application(s) Topical two times a day  OLANZapine 7.5 milliGRAM(s) Oral daily  piperacillin/tazobactam IVPB.. 3.375 Gram(s) IV Intermittent every 8 hours  polyethylene glycol 3350 17 Gram(s) Oral daily  senna Syrup 10 milliLiter(s) Oral at bedtime  valproic  acid Syrup 750 milliGRAM(s) Oral every 8 hours    MEDICATIONS  (PRN):      Objective:  Vital Signs Last 24 Hrs  T(C): 36.3 (30 Oct 2019 02:49), Max: 36.6 (29 Oct 2019 08:15)  T(F): 97.3 (30 Oct 2019 02:49), Max: 97.8 (29 Oct 2019 08:15)  HR: 61 (30 Oct 2019 06:17) (58 - 76)  BP: 118/74 (30 Oct 2019 06:17) (97/69 - 137/88)  BP(mean): --  RR: 18 (30 Oct 2019 06:17) (17 - 19)  SpO2: 96% (30 Oct 2019 06:17) (92% - 100%)         I&O's Summary    PHYSICAL EXAM:  GENERAL: no acute distress, overweight, nonverbal, on nasal cannula  HEAD: Atraumatic, Normocephalic  EYES: conjunctiva and sclera clear without jaundice  NECK: Supple  NERVOUS SYSTEM: Alert & Oriented X0, Unable to assess strength  CHEST/LUNG: breathing comfortably on 3LNC, no perioral cyanosis, course, rhonchorous breath sounds   HEART: Regular rate and rhythm; S1 and S2; No murmurs, rubs, or gallops  ABDOMEN: Soft, Nontender, Nondistended: Bowel sounds present, PEG tube present, c/d/i  EXTREMITIES: non-pitting edema or dorsal aspect of b/l feet, discoloration of right posterior heel, which is dressed c/d/i with boots on b/l    LABS:                                   8.8    5.89  )-----------( 138      ( 29 Oct 2019 03:00 )             28.8       10-29    143  |  101  |  24<H>  ----------------------------<  116<H>  4.3   |  31  |  0.62    Ca    8.0<L>      29 Oct 2019 03:00  Phos  2.9     10-29  Mg     2.5     10-29    TPro  6.2  /  Alb  2.3<L>  /  TBili  < 0.2<L>  /  DBili  x   /  AST  38<H>  /  ALT  12  /  AlkPhos  55  10-28        RADIOLOGY & ADDITIONAL TESTS:  Imaging Personally Reviewed:  [x ] YES  [ ] NO  Consultants involved in case:   Consultant(s) Notes Reviewed:  [ x] YES  [ ] NO:   Care Discussed with Consultants/Other Providers [x ] YES  [ ] NO ***************************************************************  Brii Palacios PGY 1  Internal Medicine   Pager: [phone]  [facility] in house pager: [phone]  ***************************************************************    CC: Patient is a 54y old  Female who presents with a chief complaint of Sepsis (26 Oct 2019 09:47)    Subjective: No acute events overnight. Patient seen and examined at bedside. Patient nonverbal at baseline but much more awake and alert today. She is able to grunt and push during examination. Otherwise ROS is unable to be assessed. Nurse states that it is her first time taking care of her, but she has been awake and alert today. We discussed about staggering her anti-epileptics and will try to wean down her supplemental oxygen today.     MEDICATIONS  (STANDING):  acetaminophen    Suspension .. 650 milliGRAM(s) Oral every 6 hours  albuterol/ipratropium for Nebulization 3 milliLiter(s) Nebulizer every 6 hours  artificial tears (preservative free) Ophthalmic Solution 2 Drop(s) Both EYES two times a day  aspirin  chewable 81 milliGRAM(s) Oral daily  BACItracin   Ointment 1 Application(s) Topical two times a day  cloBAZam 10 milliGRAM(s) Oral two times a day  folic acid 1 milliGRAM(s) Oral daily  heparin  Injectable 5000 Unit(s) SubCutaneous every 8 hours  lacosamide Solution 100 milliGRAM(s) Oral <User Schedule>  levETIRAcetam  Solution 750 milliGRAM(s) Oral every 12 hours  methylPREDNISolone sodium succinate Injectable 40 milliGRAM(s) IV Push daily  multivitamin 1 Tablet(s) Oral daily  nystatin Powder 1 Application(s) Topical two times a day  OLANZapine 7.5 milliGRAM(s) Oral daily  piperacillin/tazobactam IVPB.. 3.375 Gram(s) IV Intermittent every 8 hours  polyethylene glycol 3350 17 Gram(s) Oral daily  senna Syrup 10 milliLiter(s) Oral at bedtime  valproic  acid Syrup 750 milliGRAM(s) Oral every 8 hours    MEDICATIONS  (PRN):      Objective:  Vital Signs Last 24 Hrs  T(C): 36.3 (30 Oct 2019 02:49), Max: 36.6 (29 Oct 2019 08:15)  T(F): 97.3 (30 Oct 2019 02:49), Max: 97.8 (29 Oct 2019 08:15)  HR: 61 (30 Oct 2019 06:17) (58 - 76)  BP: 118/74 (30 Oct 2019 06:17) (97/69 - 137/88)  BP(mean): --  RR: 18 (30 Oct 2019 06:17) (17 - 19)  SpO2: 96% (30 Oct 2019 06:17) (92% - 100%)         I&O's Summary    PHYSICAL EXAM:  GENERAL: no acute distress, overweight, nonverbal, on nasal cannula  HEAD: Atraumatic, Normocephalic  EYES: conjunctiva and sclera clear without jaundice  NECK: Supple  NERVOUS SYSTEM: Alert & Oriented X0, Unable to assess strength  CHEST/LUNG: breathing comfortably on 3LNC, no perioral cyanosis, course, rhonchorous breath sounds   HEART: Regular rate and rhythm; S1 and S2; No murmurs, rubs, or gallops  ABDOMEN: Soft, Nontender, Nondistended: Bowel sounds present, PEG tube present, c/d/i  EXTREMITIES: non-pitting edema or dorsal aspect of b/l feet, discoloration of right posterior heel, which is dressed c/d/i with boots on b/l    LABS:                        9.4    5.63  )-----------( 151      ( 30 Oct 2019 07:46 )             31.1     10-30    144  |  102  |  25<H>  ----------------------------<  97  4.1   |  33<H>  |  0.57    Ca    8.1<L>      30 Oct 2019 07:46  Phos  3.4     10-30  Mg     2.3     10-30          RADIOLOGY & ADDITIONAL TESTS:  Imaging Personally Reviewed:  [x ] YES  [ ] NO  Consultants involved in case:   Consultant(s) Notes Reviewed:  [ x] YES  [ ] NO:   Care Discussed with Consultants/Other Providers [x ] YES  [ ] NO

## 2019-10-30 NOTE — PROGRESS NOTE ADULT - PROBLEM SELECTOR PLAN 5
- c/w home meds  - no seizure activity since admission  - will chart check patient for neuro recommendations or recent changes in neurologic medications since the patient is very drowsy  - will stagger antiepileptic medication times so that she is more awake -DVT: sqh q8h  -Diet: NPO except tube feeds  -Dispo: pending clinical improvement  -GOC: Full code, MOLST in chart  -Mother (Marine Ponce [phone]), HCP contact and updated  -PMD Dr. Shwetha Yanez ([phone])

## 2019-10-30 NOTE — PROGRESS NOTE ADULT - PROBLEM SELECTOR PLAN 1
- initially p/w fever, tachycardia, leukocytosis  - s/p ceftriaxone and azithro in ED  - Vanc and zosyn- day 6/7 of abx today  - vanc level was 27.9, held dosage overnight  - RVP + parainfluenza virus  - c/w gentle IV hydration - was hypoxic to 80s at Nursing home  - c/w Nasal Cannula, could not be weaned today, will continue to monitor if the patient is not awake enough to be taking deep breaths secondary to anti-epileptics  - CXR showed no acute process, RVP positive for parainfluenza   - c/w Duonebs q6h standing  - start mucomyst and monitor for improvement

## 2019-10-30 NOTE — PROGRESS NOTE ADULT - ASSESSMENT
54F w/ CP and seizures, presenting for fevers, tachycardia, and hypoxia to 80s w/ recent hospitalization requiring MICU pressor support, intubation for Pseudomonas and MRSA PNA, now w/ PEG tube, now admitted for sepsis 2/2 parainfluenza virus and proteus UTI. Currently on BS abx given increased risk for HAP and aspiration PNA. HC c/b hypothermia and hypotension, now on solumedrol, duonebs ATC and IVF. Hypothermia has now resolved, and the patient is currently stable with SBP in the low 100s. Course also complicated by hypoxia requiring NC and frequent 54F w/ CP and seizures, presenting for fevers, tachycardia, and hypoxia to 80s w/ recent hospitalization requiring MICU pressor support, intubation for Pseudomonas and MRSA PNA, now w/ PEG tube, now admitted for sepsis 2/2 parainfluenza virus and proteus UTI. Currently on BS abx given increased risk for HAP and aspiration PNA. HC c/b hypothermia and hypotension, now on solumedrol, duonebs ATC and IVF. Hypothermia has now resolved, and the patient is currently stable with SBP in the low 100s. Course also complicated by hypoxia requiring NC and frequent suctioning/chest PT.

## 2019-10-30 NOTE — PROGRESS NOTE ADULT - ATTENDING COMMENTS
Patient seen and examined. Agree with above note by resident.    #Parainfluenza infection with superimposed bacterial process - on vanc/zosyn day 7/7. Clinically improving. Remains hypoxic on RA. Titrate off NC as tolerated.   #Encephalopathy - patient appears more awake now. Would continue to stagger anti-epileptic meds.   #Hypoxic respiratory state - titrate off 02 as tolerated. C/w pulmonary suctioning   #Proteus UTI - on zosyn day 7/7    Plan discussed with RN and HS . Will monitor off antibiotics after today. If remains stable, will consider DC planning Patient seen and examined. Agree with above note by resident.    #Parainfluenza infection with likely superimposed bacterial infection - on vanc/zosyn day 7/7. Clinically improving. Remains hypoxic on RA. Titrate off NC as tolerated.   #Encephalopathy - patient appears more awake now. Would continue to stagger anti-epileptic meds.   #Hypoxic respiratory state - titrate off 02 as tolerated. C/w pulmonary suctioning   #Proteus UTI - on zosyn day 7/7  # Functional quadriplegia - patient needs assistance with all ADLs including feeding and toileting.     Plan discussed with RN and HS . Will monitor off antibiotics after today. If remains stable, will consider DC planning

## 2019-10-30 NOTE — PROGRESS NOTE ADULT - PROBLEM SELECTOR PLAN 4
- Per family, patient has history of hypothermia and has been noted on previous admissions  - maintaining adequate temps in the 97's off jose hugger  - will continue stress dosed steroids (day 5/5) - c/w home meds  - no seizure activity since admission  - current medications reflect her medications from the nursing facility  - will stagger antiepileptic medication times so that she is more awake

## 2019-10-31 DIAGNOSIS — R65.10 SYSTEMIC INFLAMMATORY RESPONSE SYNDROME (SIRS) OF NON-INFECTIOUS ORIGIN WITHOUT ACUTE ORGAN DYSFUNCTION: ICD-10-CM

## 2019-10-31 LAB
ANION GAP SERPL CALC-SCNC: 7 MMO/L — SIGNIFICANT CHANGE UP (ref 7–14)
BUN SERPL-MCNC: 28 MG/DL — HIGH (ref 7–23)
CALCIUM SERPL-MCNC: 7.9 MG/DL — LOW (ref 8.4–10.5)
CHLORIDE SERPL-SCNC: 101 MMOL/L — SIGNIFICANT CHANGE UP (ref 98–107)
CO2 SERPL-SCNC: 36 MMOL/L — HIGH (ref 22–31)
CREAT SERPL-MCNC: 0.61 MG/DL — SIGNIFICANT CHANGE UP (ref 0.5–1.3)
GLUCOSE SERPL-MCNC: 92 MG/DL — SIGNIFICANT CHANGE UP (ref 70–99)
HCT VFR BLD CALC: 30.1 % — LOW (ref 34.5–45)
HGB BLD-MCNC: 8.6 G/DL — LOW (ref 11.5–15.5)
MAGNESIUM SERPL-MCNC: 2.3 MG/DL — SIGNIFICANT CHANGE UP (ref 1.6–2.6)
MCHC RBC-ENTMCNC: 28.6 % — LOW (ref 32–36)
MCHC RBC-ENTMCNC: 33.7 PG — SIGNIFICANT CHANGE UP (ref 27–34)
MCV RBC AUTO: 118 FL — HIGH (ref 80–100)
NRBC # FLD: 0.06 K/UL — SIGNIFICANT CHANGE UP (ref 0–0)
PHOSPHATE SERPL-MCNC: 3.3 MG/DL — SIGNIFICANT CHANGE UP (ref 2.5–4.5)
PLATELET # BLD AUTO: 168 K/UL — SIGNIFICANT CHANGE UP (ref 150–400)
PMV BLD: 9.6 FL — SIGNIFICANT CHANGE UP (ref 7–13)
POTASSIUM SERPL-MCNC: 4.2 MMOL/L — SIGNIFICANT CHANGE UP (ref 3.5–5.3)
POTASSIUM SERPL-SCNC: 4.2 MMOL/L — SIGNIFICANT CHANGE UP (ref 3.5–5.3)
RBC # BLD: 2.55 M/UL — LOW (ref 3.8–5.2)
RBC # FLD: 15.9 % — HIGH (ref 10.3–14.5)
SODIUM SERPL-SCNC: 144 MMOL/L — SIGNIFICANT CHANGE UP (ref 135–145)
WBC # BLD: 6.75 K/UL — SIGNIFICANT CHANGE UP (ref 3.8–10.5)
WBC # FLD AUTO: 6.75 K/UL — SIGNIFICANT CHANGE UP (ref 3.8–10.5)

## 2019-10-31 PROCEDURE — 99233 SBSQ HOSP IP/OBS HIGH 50: CPT | Mod: GC

## 2019-10-31 RX ORDER — SODIUM CHLORIDE 9 MG/ML
500 INJECTION, SOLUTION INTRAVENOUS ONCE
Refills: 0 | Status: COMPLETED | OUTPATIENT
Start: 2019-10-31 | End: 2019-10-31

## 2019-10-31 RX ADMIN — Medication 1 TABLET(S): at 13:28

## 2019-10-31 RX ADMIN — NYSTATIN CREAM 1 APPLICATION(S): 100000 CREAM TOPICAL at 17:21

## 2019-10-31 RX ADMIN — Medication 1 APPLICATION(S): at 17:21

## 2019-10-31 RX ADMIN — CLOBAZAM 10 MILLIGRAM(S): 10 TABLET ORAL at 17:21

## 2019-10-31 RX ADMIN — LEVETIRACETAM 750 MILLIGRAM(S): 250 TABLET, FILM COATED ORAL at 22:15

## 2019-10-31 RX ADMIN — LEVETIRACETAM 750 MILLIGRAM(S): 250 TABLET, FILM COATED ORAL at 09:46

## 2019-10-31 RX ADMIN — Medication 4 MILLILITER(S): at 07:26

## 2019-10-31 RX ADMIN — Medication 1 MILLIGRAM(S): at 13:28

## 2019-10-31 RX ADMIN — Medication 650 MILLIGRAM(S): at 08:13

## 2019-10-31 RX ADMIN — Medication 2 DROP(S): at 17:12

## 2019-10-31 RX ADMIN — Medication 650 MILLIGRAM(S): at 03:08

## 2019-10-31 RX ADMIN — HEPARIN SODIUM 5000 UNIT(S): 5000 INJECTION INTRAVENOUS; SUBCUTANEOUS at 13:28

## 2019-10-31 RX ADMIN — Medication 81 MILLIGRAM(S): at 13:28

## 2019-10-31 RX ADMIN — LACOSAMIDE 100 MILLIGRAM(S): 50 TABLET ORAL at 13:27

## 2019-10-31 RX ADMIN — CLOBAZAM 10 MILLIGRAM(S): 10 TABLET ORAL at 05:03

## 2019-10-31 RX ADMIN — HEPARIN SODIUM 5000 UNIT(S): 5000 INJECTION INTRAVENOUS; SUBCUTANEOUS at 05:02

## 2019-10-31 RX ADMIN — OLANZAPINE 7.5 MILLIGRAM(S): 15 TABLET, FILM COATED ORAL at 13:28

## 2019-10-31 RX ADMIN — SODIUM CHLORIDE 1000 MILLILITER(S): 9 INJECTION, SOLUTION INTRAVENOUS at 09:46

## 2019-10-31 RX ADMIN — Medication 750 MILLIGRAM(S): at 08:13

## 2019-10-31 RX ADMIN — LACOSAMIDE 100 MILLIGRAM(S): 50 TABLET ORAL at 05:03

## 2019-10-31 RX ADMIN — Medication 750 MILLIGRAM(S): at 17:12

## 2019-10-31 RX ADMIN — Medication 650 MILLIGRAM(S): at 17:12

## 2019-10-31 RX ADMIN — Medication 2 DROP(S): at 05:02

## 2019-10-31 RX ADMIN — HEPARIN SODIUM 5000 UNIT(S): 5000 INJECTION INTRAVENOUS; SUBCUTANEOUS at 22:15

## 2019-10-31 RX ADMIN — Medication 1 APPLICATION(S): at 05:03

## 2019-10-31 RX ADMIN — NYSTATIN CREAM 1 APPLICATION(S): 100000 CREAM TOPICAL at 05:03

## 2019-10-31 RX ADMIN — LACOSAMIDE 100 MILLIGRAM(S): 50 TABLET ORAL at 17:12

## 2019-10-31 RX ADMIN — Medication 750 MILLIGRAM(S): at 23:09

## 2019-10-31 RX ADMIN — Medication 3 MILLILITER(S): at 07:26

## 2019-10-31 RX ADMIN — Medication 650 MILLIGRAM(S): at 22:15

## 2019-10-31 NOTE — PROGRESS NOTE ADULT - PROBLEM SELECTOR PLAN 4
- c/w home meds  - no seizure activity since admission  - current medications reflect her medications from the nursing facility  - will stagger antiepileptic medication times so that she is more awake

## 2019-10-31 NOTE — PROGRESS NOTE ADULT - PROBLEM SELECTOR PLAN 1
- was hypoxic to 80s at Nursing home  - c/w Nasal Cannula, could not be weaned today, will continue to monitor if the patient is not awake enough to be taking deep breaths secondary to anti-epileptics  - CXR showed no acute process, RVP positive for parainfluenza   - c/w Duonebs q6h standing  - start mucomyst and monitor for improvement - Patient had been previously stable, but through the night and this morning, she was hypothermic to 93F, which resolved with Terrence hugger  - Currently she is 98F off Terrence hugger, will continue to monitor  - BP this morning was 81/44, which resolved with 500cc NS bolus  - blood cultures pending from 10/31  - most likely not infectious source since she has already received 7 days of Vancomycin/Zosyn, no leukocytosis  - will continue to monitor

## 2019-10-31 NOTE — PROGRESS NOTE ADULT - PROBLEM SELECTOR PLAN 5
-DVT: sqh q8h  -Diet: NPO except tube feeds  -Dispo: pending clinical improvement  -GOC: Full code, MOLST in chart  -Mother (Marine Ponce [phone]), HCP contact and updated  -PMD Dr. Shwetha Yanez ([phone])

## 2019-10-31 NOTE — PROGRESS NOTE ADULT - ASSESSMENT
54F w/ CP and seizures, presenting for fevers, tachycardia, and hypoxia to 80s w/ recent hospitalization requiring MICU pressor support, intubation for Pseudomonas and MRSA PNA, now w/ PEG tube, now admitted for sepsis 2/2 parainfluenza virus and proteus UTI. Currently on BS abx given increased risk for HAP and aspiration PNA. HC c/b hypothermia and hypotension, now on solumedrol, duonebs ATC and IVF. Hypothermia has now resolved, and the patient is currently stable with SBP in the low 100s. Course also complicated by hypoxia requiring NC and frequent suctioning/chest PT. 54F w/ CP and seizures, presenting for fevers, tachycardia, and hypoxia to 80s w/ recent hospitalization requiring MICU pressor support, intubation for Pseudomonas and MRSA PNA, now w/ PEG tube, now admitted for sepsis 2/2 parainfluenza virus and proteus UTI. Currently on BS abx given increased risk for HAP and aspiration PNA. HC c/b hypothermia and hypotension, now on solumedrol, duonebs ATC and IVF. Hypothermia had resolved, but on 10/31, the patient had another episode of hypothermia to 93F and Terrence hugger was initiated again. Course has been complicated by SBPs in the 80s, which then resolved with 500cc bolus. Repeat BP was 130/78.

## 2019-10-31 NOTE — PROGRESS NOTE ADULT - PROBLEM SELECTOR PLAN 3
- now resolved  - initially p/w fever, tachycardia, leukocytosis  - s/p ceftriaxone and azithro in ED  - Vanc and zosyn- day 7/7 of abx today  - RVP + parainfluenza virus  - c/w gentle IV hydration - UA +; UCx + proteus, pansensitive  - s/p 7 days of Zosyn

## 2019-10-31 NOTE — PROGRESS NOTE ADULT - ATTENDING COMMENTS
Patient seen and examined. Agree with above note by resident.    #Parainfluenza infection with likely superimposed bacterial infection - s/p 7 day course of vanc/zosyn . Clinically improving. Remains hypoxic on RA but oxygyn requirements are decreasing. Titrate off NC as tolerated. Hypothermic overnight. Would monitor off jose hugger today. Repeat cultures sent. Clinically looks ok, hold off on restarting antibiotics.    #Encephalopathy - patient appears more awake now. Would continue to stagger anti-epileptic meds.   #Hypoxic respiratory state - titrate off 02 as tolerated. C/w pulmonary suctioning   #Proteus UTI -s/p 7 day course of zosyn  # Functional quadriplegia - patient needs assistance with all ADLs including feeding and toileting.     Will monitor off antibiotics today. If remains stable and repeat cultures negative, will consider DC planning .

## 2019-10-31 NOTE — PROGRESS NOTE ADULT - PROBLEM SELECTOR PLAN 2
- UA +; UCx + proteus, pansensitive  - c/w  zosyn (day 7/7) - hypoxic at nursing home in the 80s  - now satting 93% on room air  - considering that she was hypotensive and hypothermic overnight, will continue with 1L NC for now and continue to wean if possible  - overall hypoxia has been improving  - c/w frequent suctioning/ chest PT/ duonebs and mucomyst

## 2019-10-31 NOTE — PROGRESS NOTE ADULT - SUBJECTIVE AND OBJECTIVE BOX
***************************************************************  Brii Palacios PGY 1  Internal Medicine   Pager: [phone]  [facility] in house pager: [phone]  ***************************************************************    CC: Patient is a 54y old  Female who presents with a chief complaint of Sepsis (26 Oct 2019 09:47)    Subjective:   Overnight the patient was hypothermic to 93F.    MEDICATIONS  (STANDING):  acetaminophen    Suspension .. 650 milliGRAM(s) Oral every 6 hours  albuterol/ipratropium for Nebulization 3 milliLiter(s) Nebulizer every 6 hours  artificial tears (preservative free) Ophthalmic Solution 2 Drop(s) Both EYES two times a day  aspirin  chewable 81 milliGRAM(s) Oral daily  BACItracin   Ointment 1 Application(s) Topical two times a day  cloBAZam 10 milliGRAM(s) Oral two times a day  folic acid 1 milliGRAM(s) Oral daily  heparin  Injectable 5000 Unit(s) SubCutaneous every 8 hours  lacosamide Solution 100 milliGRAM(s) Oral <User Schedule>  levETIRAcetam  Solution 750 milliGRAM(s) Oral every 12 hours  methylPREDNISolone sodium succinate Injectable 40 milliGRAM(s) IV Push daily  multivitamin 1 Tablet(s) Oral daily  nystatin Powder 1 Application(s) Topical two times a day  OLANZapine 7.5 milliGRAM(s) Oral daily  piperacillin/tazobactam IVPB.. 3.375 Gram(s) IV Intermittent every 8 hours  polyethylene glycol 3350 17 Gram(s) Oral daily  senna Syrup 10 milliLiter(s) Oral at bedtime  valproic  acid Syrup 750 milliGRAM(s) Oral every 8 hours    MEDICATIONS  (PRN):      Objective:  Vital Signs Last 24 Hrs  T(C): 36.3 (30 Oct 2019 02:49), Max: 36.6 (29 Oct 2019 08:15)  T(F): 97.3 (30 Oct 2019 02:49), Max: 97.8 (29 Oct 2019 08:15)  HR: 61 (30 Oct 2019 06:17) (58 - 76)  BP: 118/74 (30 Oct 2019 06:17) (97/69 - 137/88)  BP(mean): --  RR: 18 (30 Oct 2019 06:17) (17 - 19)  SpO2: 96% (30 Oct 2019 06:17) (92% - 100%)         I&O's Summary    PHYSICAL EXAM:  GENERAL: no acute distress, overweight, nonverbal, on nasal cannula  HEAD: Atraumatic, Normocephalic  EYES: conjunctiva and sclera clear without jaundice  NECK: Supple  NERVOUS SYSTEM: Alert & Oriented X0, Unable to assess strength  CHEST/LUNG: breathing comfortably on 3LNC, no perioral cyanosis, course, rhonchorous breath sounds   HEART: Regular rate and rhythm; S1 and S2; No murmurs, rubs, or gallops  ABDOMEN: Soft, Nontender, Nondistended: Bowel sounds present, PEG tube present, c/d/i  EXTREMITIES: non-pitting edema or dorsal aspect of b/l feet, discoloration of right posterior heel, which is dressed c/d/i with boots on b/l    LABS:                        9.4    5.63  )-----------( 151      ( 30 Oct 2019 07:46 )             31.1     10-30    144  |  102  |  25<H>  ----------------------------<  97  4.1   |  33<H>  |  0.57    Ca    8.1<L>      30 Oct 2019 07:46  Phos  3.4     10-30  Mg     2.3     10-30          RADIOLOGY & ADDITIONAL TESTS:  Imaging Personally Reviewed:  [x ] YES  [ ] NO  Consultants involved in case:   Consultant(s) Notes Reviewed:  [ x] YES  [ ] NO:   Care Discussed with Consultants/Other Providers [x ] YES  [ ] NO ***************************************************************  Brii Palacios PGY 1  Internal Medicine   Pager: [phone]  [facility] in house pager: [phone]  ***************************************************************    CC: Patient is a 54y old  Female who presents with a chief complaint of Sepsis (26 Oct 2019 09:47)    Subjective:   Overnight the patient was hypothermic to 93F. NF restarted jose hugger and her repeat temperature was 98F. Patient seen and examined this morning. The patient remains non-verbal, but does not appear to be in any acute distress. She is awake and alert, able to grunt. As per nurse, the patient had a BP reading of 81/45, but asymptomatic. Also, she was satting 93% on RA. Therefore, she was taken off the Jose hugger, given 1L NC and given 500cc NS bolus. Repeat BP was 130/78.    MEDICATIONS  (STANDING):  acetaminophen    Suspension .. 650 milliGRAM(s) Oral every 6 hours  albuterol/ipratropium for Nebulization 3 milliLiter(s) Nebulizer every 6 hours  artificial tears (preservative free) Ophthalmic Solution 2 Drop(s) Both EYES two times a day  aspirin  chewable 81 milliGRAM(s) Oral daily  BACItracin   Ointment 1 Application(s) Topical two times a day  cloBAZam 10 milliGRAM(s) Oral two times a day  folic acid 1 milliGRAM(s) Oral daily  heparin  Injectable 5000 Unit(s) SubCutaneous every 8 hours  lacosamide Solution 100 milliGRAM(s) Oral <User Schedule>  levETIRAcetam  Solution 750 milliGRAM(s) Oral every 12 hours  methylPREDNISolone sodium succinate Injectable 40 milliGRAM(s) IV Push daily  multivitamin 1 Tablet(s) Oral daily  nystatin Powder 1 Application(s) Topical two times a day  OLANZapine 7.5 milliGRAM(s) Oral daily  piperacillin/tazobactam IVPB.. 3.375 Gram(s) IV Intermittent every 8 hours  polyethylene glycol 3350 17 Gram(s) Oral daily  senna Syrup 10 milliLiter(s) Oral at bedtime  valproic  acid Syrup 750 milliGRAM(s) Oral every 8 hours    MEDICATIONS  (PRN):      Objective:  Vital Signs Last 24 Hrs  T(C): 36.3 (30 Oct 2019 02:49), Max: 36.6 (29 Oct 2019 08:15)  T(F): 97.3 (30 Oct 2019 02:49), Max: 97.8 (29 Oct 2019 08:15)  HR: 61 (30 Oct 2019 06:17) (58 - 76)  BP: 118/74 (30 Oct 2019 06:17) (97/69 - 137/88)  BP(mean): --  RR: 18 (30 Oct 2019 06:17) (17 - 19)  SpO2: 96% (30 Oct 2019 06:17) (92% - 100%)         I&O's Summary    PHYSICAL EXAM:  GENERAL: no acute distress, overweight, nonverbal, on nasal cannula  HEAD: Atraumatic, Normocephalic  EYES: conjunctiva and sclera clear without jaundice  NECK: Supple  NERVOUS SYSTEM: Alert & Oriented X0, Unable to assess strength  CHEST/LUNG: breathing comfortably on 3LNC, no perioral cyanosis, course  HEART: Regular rate and rhythm; S1 and S2; No murmurs, rubs, or gallops  ABDOMEN: Soft, Nontender, Nondistended: Bowel sounds present, PEG tube present, c/d/i  EXTREMITIES: non-pitting edema or dorsal aspect of b/l feet, discoloration of right posterior heel, which is dressed c/d/i with boots on b/l, additionally right elbow is now blanchable, dressed with foam    LABS:                        8.6    6.75  )-----------( 168      ( 31 Oct 2019 07:15 )             30.1     10-31    144  |  101  |  28<H>  ----------------------------<  92  4.2   |  36<H>  |  0.61    Ca    7.9<L>      31 Oct 2019 07:15  Phos  3.3     10-31  Mg     2.3     10-31            RADIOLOGY & ADDITIONAL TESTS:  Imaging Personally Reviewed:  [x ] YES  [ ] NO  Consultants involved in case:   Consultant(s) Notes Reviewed:  [ x] YES  [ ] NO:   Care Discussed with Consultants/Other Providers [x ] YES  [ ] NO

## 2019-10-31 NOTE — PROVIDER CONTACT NOTE (OTHER) - ACTION/TREATMENT ORDERED:
MD notified and states will order bolus and to put patient on 1L nasal cannula, no further interventions at this time. Will continue to monitor

## 2019-10-31 NOTE — PROVIDER CONTACT NOTE (OTHER) - ASSESSMENT
Temp 93. Pt doesn't appear to be in any distress. Skin cool to touch Temp 93. /49 Pt doesn't appear to be in any distress. Skin cool to touch

## 2019-11-01 LAB
ANION GAP SERPL CALC-SCNC: 8 MMO/L — SIGNIFICANT CHANGE UP (ref 7–14)
BUN SERPL-MCNC: 25 MG/DL — HIGH (ref 7–23)
CALCIUM SERPL-MCNC: 8 MG/DL — LOW (ref 8.4–10.5)
CHLORIDE SERPL-SCNC: 102 MMOL/L — SIGNIFICANT CHANGE UP (ref 98–107)
CO2 SERPL-SCNC: 36 MMOL/L — HIGH (ref 22–31)
CREAT SERPL-MCNC: 0.6 MG/DL — SIGNIFICANT CHANGE UP (ref 0.5–1.3)
GLUCOSE SERPL-MCNC: 88 MG/DL — SIGNIFICANT CHANGE UP (ref 70–99)
HCT VFR BLD CALC: 32.6 % — LOW (ref 34.5–45)
HGB BLD-MCNC: 9.4 G/DL — LOW (ref 11.5–15.5)
MAGNESIUM SERPL-MCNC: 2.3 MG/DL — SIGNIFICANT CHANGE UP (ref 1.6–2.6)
MCHC RBC-ENTMCNC: 28.8 % — LOW (ref 32–36)
MCHC RBC-ENTMCNC: 33.7 PG — SIGNIFICANT CHANGE UP (ref 27–34)
MCV RBC AUTO: 116.8 FL — HIGH (ref 80–100)
METHOD TYPE: SIGNIFICANT CHANGE UP
NRBC # FLD: 0.07 K/UL — SIGNIFICANT CHANGE UP (ref 0–0)
NRBC FLD-RTO: 1 — SIGNIFICANT CHANGE UP
ORGANISM # SPEC MICROSCOPIC CNT: SIGNIFICANT CHANGE UP
ORGANISM # SPEC MICROSCOPIC CNT: SIGNIFICANT CHANGE UP
PHOSPHATE SERPL-MCNC: 4.1 MG/DL — SIGNIFICANT CHANGE UP (ref 2.5–4.5)
PLATELET # BLD AUTO: 163 K/UL — SIGNIFICANT CHANGE UP (ref 150–400)
PMV BLD: 9.4 FL — SIGNIFICANT CHANGE UP (ref 7–13)
POTASSIUM SERPL-MCNC: 4.3 MMOL/L — SIGNIFICANT CHANGE UP (ref 3.5–5.3)
POTASSIUM SERPL-SCNC: 4.3 MMOL/L — SIGNIFICANT CHANGE UP (ref 3.5–5.3)
RBC # BLD: 2.79 M/UL — LOW (ref 3.8–5.2)
RBC # FLD: 16.8 % — HIGH (ref 10.3–14.5)
SODIUM SERPL-SCNC: 146 MMOL/L — HIGH (ref 135–145)
SPECIMEN SOURCE: SIGNIFICANT CHANGE UP
SPECIMEN SOURCE: SIGNIFICANT CHANGE UP
WBC # BLD: 7.07 K/UL — SIGNIFICANT CHANGE UP (ref 3.8–10.5)
WBC # FLD AUTO: 7.07 K/UL — SIGNIFICANT CHANGE UP (ref 3.8–10.5)

## 2019-11-01 PROCEDURE — 99232 SBSQ HOSP IP/OBS MODERATE 35: CPT | Mod: GC

## 2019-11-01 RX ADMIN — Medication 650 MILLIGRAM(S): at 03:03

## 2019-11-01 RX ADMIN — Medication 2 DROP(S): at 05:15

## 2019-11-01 RX ADMIN — Medication 750 MILLIGRAM(S): at 17:08

## 2019-11-01 RX ADMIN — Medication 1 TABLET(S): at 13:07

## 2019-11-01 RX ADMIN — HEPARIN SODIUM 5000 UNIT(S): 5000 INJECTION INTRAVENOUS; SUBCUTANEOUS at 23:30

## 2019-11-01 RX ADMIN — HEPARIN SODIUM 5000 UNIT(S): 5000 INJECTION INTRAVENOUS; SUBCUTANEOUS at 13:07

## 2019-11-01 RX ADMIN — HEPARIN SODIUM 5000 UNIT(S): 5000 INJECTION INTRAVENOUS; SUBCUTANEOUS at 05:15

## 2019-11-01 RX ADMIN — Medication 650 MILLIGRAM(S): at 09:40

## 2019-11-01 RX ADMIN — Medication 1 APPLICATION(S): at 05:15

## 2019-11-01 RX ADMIN — LACOSAMIDE 100 MILLIGRAM(S): 50 TABLET ORAL at 05:15

## 2019-11-01 RX ADMIN — LEVETIRACETAM 750 MILLIGRAM(S): 250 TABLET, FILM COATED ORAL at 09:40

## 2019-11-01 RX ADMIN — OLANZAPINE 7.5 MILLIGRAM(S): 15 TABLET, FILM COATED ORAL at 13:07

## 2019-11-01 RX ADMIN — Medication 1 APPLICATION(S): at 17:12

## 2019-11-01 RX ADMIN — CLOBAZAM 10 MILLIGRAM(S): 10 TABLET ORAL at 05:15

## 2019-11-01 RX ADMIN — SENNA PLUS 10 MILLILITER(S): 8.6 TABLET ORAL at 23:26

## 2019-11-01 RX ADMIN — Medication 650 MILLIGRAM(S): at 23:25

## 2019-11-01 RX ADMIN — Medication 1 MILLIGRAM(S): at 13:07

## 2019-11-01 RX ADMIN — Medication 750 MILLIGRAM(S): at 09:40

## 2019-11-01 RX ADMIN — LACOSAMIDE 100 MILLIGRAM(S): 50 TABLET ORAL at 17:07

## 2019-11-01 RX ADMIN — LEVETIRACETAM 750 MILLIGRAM(S): 250 TABLET, FILM COATED ORAL at 23:26

## 2019-11-01 RX ADMIN — Medication 2 DROP(S): at 17:13

## 2019-11-01 RX ADMIN — CLOBAZAM 10 MILLIGRAM(S): 10 TABLET ORAL at 17:12

## 2019-11-01 RX ADMIN — NYSTATIN CREAM 1 APPLICATION(S): 100000 CREAM TOPICAL at 17:07

## 2019-11-01 RX ADMIN — Medication 750 MILLIGRAM(S): at 23:26

## 2019-11-01 RX ADMIN — LACOSAMIDE 100 MILLIGRAM(S): 50 TABLET ORAL at 13:06

## 2019-11-01 RX ADMIN — Medication 81 MILLIGRAM(S): at 13:07

## 2019-11-01 RX ADMIN — Medication 650 MILLIGRAM(S): at 15:08

## 2019-11-01 RX ADMIN — NYSTATIN CREAM 1 APPLICATION(S): 100000 CREAM TOPICAL at 05:43

## 2019-11-01 NOTE — PROVIDER CONTACT NOTE (CRITICAL VALUE NOTIFICATION) - ACTION/TREATMENT ORDERED:
MD aware. Morning dose of Vancomycin to be held. MD to discontinue Vancomycin until further notice.
MD aware. Hold 3am Vanco. Continue to monitor.
Md was informed . New set of blood cultures for the am ordered.

## 2019-11-01 NOTE — PROGRESS NOTE ADULT - PROBLEM SELECTOR PLAN 1
- currently maintaining temperature 97-98F off Terrence Hugger  - BP 92/62 this morning, asymptomatic   - blood cultures pending from 10/31, will f/u  - most likely not infectious source since she has already received 7 days of Vancomycin/Zosyn, no leukocytosis  - will continue to monitor  - started 150cc of free water through PEG with feeds

## 2019-11-01 NOTE — PROVIDER CONTACT NOTE (CRITICAL VALUE NOTIFICATION) - ASSESSMENT
Patient is Alert, nonverbal at baseline. In no apparent distress. Vital signs stable. No respiratory distress noted. Patient is afebrile.
Patient is resting comfortably.
Pt asymptomatic.

## 2019-11-01 NOTE — PROGRESS NOTE ADULT - ATTENDING COMMENTS
Patient seen and examined. Agree with above note by resident.    #Parainfluenza infection with likely superimposed bacterial infection - s/p 7 day course of vanc/zosyn . Clinically improving. Hypothermic on 10/31 but now off of jose hugger for 24 hrs. No further hypothermia. Also now saturating 95 on RA. Supplemental o2 for sat<92%    #Encephalopathy - patient appears more awake now. Would continue to stagger anti-epileptic meds.     #Hypoxic respiratory state - titrate off 02 as tolerated. C/w pulmonary suctioning. Now on RA.      #Proteus UTI -s/p 7 day course of zosyn    # Functional quadriplegia - patient needs assistance with all ADLs including feeding and toileting.     Will monitor off antibiotics today. If remains stable and repeat cultures negative, will consider DC planning on saturday

## 2019-11-01 NOTE — PROGRESS NOTE ADULT - PROBLEM SELECTOR PLAN 5
-DVT: sqh q8h  -Diet: NPO except tube feeds, free water through PEG  -Dispo: pending clinical improvement  -GOC: Full code, MOLST in chart  -Mother (Marine Ponce [phone]), HCP contact and updated  -PMD Dr. Shwetha Yanez ([phone])

## 2019-11-01 NOTE — DIETITIAN INITIAL EVALUATION ADULT. - PROBLEM SELECTOR PLAN 5
-DVT: sqh q8h  -Diet: NPO except tube feeds  -Dispo: pending clinical improvement  -GOC: Full code, MOLST in chart  -Will need to contact daughter (Marine Ponce [phone]) who may be HCP  -Will need to contact PMD Dr. Shwetha Yanez ([phone]) in AM

## 2019-11-01 NOTE — PROGRESS NOTE ADULT - ASSESSMENT
54F w/ CP and seizures, presenting for fevers, tachycardia, and hypoxia to 80s w/ recent hospitalization requiring MICU pressor support, intubation for Pseudomonas and MRSA PNA, now w/ PEG tube, now admitted for sepsis 2/2 parainfluenza virus and proteus UTI. Currently on BS abx given increased risk for HAP and aspiration PNA. HC c/b hypothermia and hypotension, now on solumedrol, duonebs ATC and IVF. Hypothermia has resolved, but the patient is intermittently hypotensive, but responsive to fluids. Because she had an episode of hypothermia and hypotension on 10/31, blood cultures were sent, though suspicion for infectious etiology low because she is now s/p 7 days of vancomycin and zosyn.

## 2019-11-01 NOTE — DIETITIAN INITIAL EVALUATION ADULT. - OTHER INFO
RD visited with patient for LOS.  Patient unable to provide information for diet interview - no family present at time of visit.  Collateral information obtained from RN. Reported to be tolerating enteral feedings without GI distress i.e. nausea, vomiting, diarrhea.  No reported food allergies or intolerances.    Patient previously admitted - last note reviewed 9/23/19 - weight fluctuations previously noted 66-76kg; current weight trends also inconsistent, possibly due to fluid status, as per flowsheets noted with 2-3+ edema.

## 2019-11-01 NOTE — DIETITIAN INITIAL EVALUATION ADULT. - ENERGY INTAKE
EN providing 1440kcal, 66.6gm pro, 968.4ml water via formula (29kcal/kg IBW, 1.3gm/kg/IBW pro) Receiving tube feeding and tolerating @ goal rate

## 2019-11-01 NOTE — PROVIDER CONTACT NOTE (CRITICAL VALUE NOTIFICATION) - SITUATION
Vanco Trough level 27.9
Vancomycin trough 30.9
Patient with (+) blood culture as resulted above. She completed her antibiotics.

## 2019-11-01 NOTE — DIETITIAN INITIAL EVALUATION ADULT. - PERTINENT LABORATORY DATA
11-01 Na146 mmol/L<H> Glu 88 mg/dL K+ 4.3 mmol/L Cr  0.60 mg/dL BUN 25 mg/dL<H> 11-01 Phos 4.1 mg/dL 10-28 Alb 2.3 g/dL<L>

## 2019-11-01 NOTE — DIETITIAN INITIAL EVALUATION ADULT. - ENERGY NEEDS
Ht: n/a Wt: 11/1/19 79.5kg BMI: 34.2  IBW: based on 8/19/19 Ht 60 in; 45kg +/-10% 40.5-49.5kg  Edema: 2-3+ edema per flowsheets  Pressure Injuries: Rt heel SDTI

## 2019-11-01 NOTE — DIETITIAN INITIAL EVALUATION ADULT. - PERTINENT MEDS FT
MEDICATIONS  (STANDING):  acetaminophen    Suspension .. 650 milliGRAM(s) Oral every 6 hours  artificial tears (preservative free) Ophthalmic Solution 2 Drop(s) Both EYES two times a day  aspirin  chewable 81 milliGRAM(s) Oral daily  BACItracin   Ointment 1 Application(s) Topical two times a day  cloBAZam 10 milliGRAM(s) Oral two times a day  folic acid 1 milliGRAM(s) Oral daily  heparin  Injectable 5000 Unit(s) SubCutaneous every 8 hours  lacosamide Solution 100 milliGRAM(s) Oral <User Schedule>  levETIRAcetam  Solution 750 milliGRAM(s) Oral <User Schedule>  multivitamin 1 Tablet(s) Oral daily  nystatin Powder 1 Application(s) Topical two times a day  OLANZapine 7.5 milliGRAM(s) Oral daily  polyethylene glycol 3350 17 Gram(s) Oral daily  senna Syrup 10 milliLiter(s) Oral at bedtime  valproic  acid Syrup 750 milliGRAM(s) Oral <User Schedule>    MEDICATIONS  (PRN):  albuterol/ipratropium for Nebulization 3 milliLiter(s) Nebulizer every 6 hours PRN Respiratory Distress

## 2019-11-01 NOTE — PROGRESS NOTE ADULT - PROBLEM SELECTOR PLAN 2
- hypoxic at nursing home in the 80s  - now satting mid 90s on RA and has been on RA through the night  - c/w frequent suctioning/ chest PT/ duonebs and mucomyst

## 2019-11-01 NOTE — PROVIDER CONTACT NOTE (OTHER) - ACTION/TREATMENT ORDERED:
MD made aware. No interventions ordered at this time. Will continue to monitor. MD made aware. States to repeat BP. Will continue to monitor.

## 2019-11-01 NOTE — PROVIDER CONTACT NOTE (CRITICAL VALUE NOTIFICATION) - BACKGROUND
54 year old female admitted with sepsis and UTI. Positive for parainfluenza.
Patient was admitted with UTI/pyelonephritis.
Pt admitted for a UTI. PMH of cerebral palsy, seizure disorder, and sepsis.

## 2019-11-02 LAB
ANION GAP SERPL CALC-SCNC: 9 MMO/L — SIGNIFICANT CHANGE UP (ref 7–14)
BUN SERPL-MCNC: 21 MG/DL — SIGNIFICANT CHANGE UP (ref 7–23)
CALCIUM SERPL-MCNC: 7.9 MG/DL — LOW (ref 8.4–10.5)
CHLORIDE SERPL-SCNC: 103 MMOL/L — SIGNIFICANT CHANGE UP (ref 98–107)
CO2 SERPL-SCNC: 33 MMOL/L — HIGH (ref 22–31)
CREAT SERPL-MCNC: 0.59 MG/DL — SIGNIFICANT CHANGE UP (ref 0.5–1.3)
GLUCOSE SERPL-MCNC: 68 MG/DL — LOW (ref 70–99)
HCT VFR BLD CALC: 31.4 % — LOW (ref 34.5–45)
HGB BLD-MCNC: 8.9 G/DL — LOW (ref 11.5–15.5)
MAGNESIUM SERPL-MCNC: 2.3 MG/DL — SIGNIFICANT CHANGE UP (ref 1.6–2.6)
MCHC RBC-ENTMCNC: 28.3 % — LOW (ref 32–36)
MCHC RBC-ENTMCNC: 33.8 PG — SIGNIFICANT CHANGE UP (ref 27–34)
MCV RBC AUTO: 119.4 FL — HIGH (ref 80–100)
NRBC # FLD: 0.07 K/UL — SIGNIFICANT CHANGE UP (ref 0–0)
NRBC FLD-RTO: 1 — SIGNIFICANT CHANGE UP
ORGANISM # SPEC MICROSCOPIC CNT: SIGNIFICANT CHANGE UP
PHOSPHATE SERPL-MCNC: 4.6 MG/DL — HIGH (ref 2.5–4.5)
PLATELET # BLD AUTO: 157 K/UL — SIGNIFICANT CHANGE UP (ref 150–400)
PMV BLD: 10.1 FL — SIGNIFICANT CHANGE UP (ref 7–13)
POTASSIUM SERPL-MCNC: 4.9 MMOL/L — SIGNIFICANT CHANGE UP (ref 3.5–5.3)
POTASSIUM SERPL-SCNC: 4.9 MMOL/L — SIGNIFICANT CHANGE UP (ref 3.5–5.3)
RBC # BLD: 2.63 M/UL — LOW (ref 3.8–5.2)
RBC # FLD: 17.2 % — HIGH (ref 10.3–14.5)
SODIUM SERPL-SCNC: 145 MMOL/L — SIGNIFICANT CHANGE UP (ref 135–145)
WBC # BLD: 6.95 K/UL — SIGNIFICANT CHANGE UP (ref 3.8–10.5)
WBC # FLD AUTO: 6.95 K/UL — SIGNIFICANT CHANGE UP (ref 3.8–10.5)

## 2019-11-02 PROCEDURE — 99232 SBSQ HOSP IP/OBS MODERATE 35: CPT | Mod: GC

## 2019-11-02 RX ORDER — POLYETHYLENE GLYCOL 3350 17 G/17G
17 POWDER, FOR SOLUTION ORAL DAILY
Refills: 0 | Status: DISCONTINUED | OUTPATIENT
Start: 2019-11-02 | End: 2019-11-04

## 2019-11-02 RX ADMIN — Medication 1 APPLICATION(S): at 17:48

## 2019-11-02 RX ADMIN — HEPARIN SODIUM 5000 UNIT(S): 5000 INJECTION INTRAVENOUS; SUBCUTANEOUS at 13:10

## 2019-11-02 RX ADMIN — Medication 81 MILLIGRAM(S): at 11:02

## 2019-11-02 RX ADMIN — Medication 650 MILLIGRAM(S): at 09:57

## 2019-11-02 RX ADMIN — Medication 1 APPLICATION(S): at 06:08

## 2019-11-02 RX ADMIN — LACOSAMIDE 100 MILLIGRAM(S): 50 TABLET ORAL at 17:47

## 2019-11-02 RX ADMIN — Medication 2 DROP(S): at 17:46

## 2019-11-02 RX ADMIN — LACOSAMIDE 100 MILLIGRAM(S): 50 TABLET ORAL at 11:21

## 2019-11-02 RX ADMIN — HEPARIN SODIUM 5000 UNIT(S): 5000 INJECTION INTRAVENOUS; SUBCUTANEOUS at 23:10

## 2019-11-02 RX ADMIN — Medication 650 MILLIGRAM(S): at 21:36

## 2019-11-02 RX ADMIN — Medication 750 MILLIGRAM(S): at 16:24

## 2019-11-02 RX ADMIN — NYSTATIN CREAM 1 APPLICATION(S): 100000 CREAM TOPICAL at 17:46

## 2019-11-02 RX ADMIN — Medication 2 DROP(S): at 06:07

## 2019-11-02 RX ADMIN — NYSTATIN CREAM 1 APPLICATION(S): 100000 CREAM TOPICAL at 06:17

## 2019-11-02 RX ADMIN — LEVETIRACETAM 750 MILLIGRAM(S): 250 TABLET, FILM COATED ORAL at 23:06

## 2019-11-02 RX ADMIN — LEVETIRACETAM 750 MILLIGRAM(S): 250 TABLET, FILM COATED ORAL at 09:57

## 2019-11-02 RX ADMIN — CLOBAZAM 10 MILLIGRAM(S): 10 TABLET ORAL at 17:53

## 2019-11-02 RX ADMIN — Medication 1 TABLET(S): at 11:02

## 2019-11-02 RX ADMIN — Medication 650 MILLIGRAM(S): at 03:52

## 2019-11-02 RX ADMIN — Medication 650 MILLIGRAM(S): at 16:24

## 2019-11-02 RX ADMIN — Medication 750 MILLIGRAM(S): at 23:07

## 2019-11-02 RX ADMIN — CLOBAZAM 10 MILLIGRAM(S): 10 TABLET ORAL at 06:15

## 2019-11-02 RX ADMIN — Medication 1 MILLIGRAM(S): at 11:02

## 2019-11-02 RX ADMIN — HEPARIN SODIUM 5000 UNIT(S): 5000 INJECTION INTRAVENOUS; SUBCUTANEOUS at 06:07

## 2019-11-02 RX ADMIN — LACOSAMIDE 100 MILLIGRAM(S): 50 TABLET ORAL at 06:15

## 2019-11-02 RX ADMIN — POLYETHYLENE GLYCOL 3350 17 GRAM(S): 17 POWDER, FOR SOLUTION ORAL at 11:02

## 2019-11-02 RX ADMIN — Medication 750 MILLIGRAM(S): at 08:48

## 2019-11-02 RX ADMIN — OLANZAPINE 7.5 MILLIGRAM(S): 15 TABLET, FILM COATED ORAL at 11:02

## 2019-11-02 NOTE — PROGRESS NOTE ADULT - ASSESSMENT
54F w/ CP and seizures, presenting for fevers, tachycardia, and hypoxia to 80s w/ recent hospitalization requiring MICU pressor support, intubation for Pseudomonas and MRSA PNA, now w/ PEG tube, now admitted for sepsis 2/2 parainfluenza virus and proteus UTI. Currently on BS abx given increased risk for HAP and aspiration PNA. HC c/b hypothermia and hypotension, now on solumedrol, duonebs ATC and IVF. Hypothermia has resolved, but the patient is intermittently hypotensive, but responsive to fluids. Because she had an episode of hypothermia and hypotension on 10/31, blood cultures were sent and positive for gram positive cocci in clusters.

## 2019-11-02 NOTE — PROGRESS NOTE ADULT - SUBJECTIVE AND OBJECTIVE BOX
***************************************************************  Brii Palacios PGY 1  Internal Medicine   Pager: [phone]  [facility] in house pager: [phone]  ***************************************************************    CC: Patient is a 54y old  Female who presents with a chief complaint of Sepsis (26 Oct 2019 09:47)    Subjective:   No acute events overnight. Patient seen and examined this morning.      MEDICATIONS  (STANDING):  acetaminophen    Suspension .. 650 milliGRAM(s) Oral every 6 hours  albuterol/ipratropium for Nebulization 3 milliLiter(s) Nebulizer every 6 hours  artificial tears (preservative free) Ophthalmic Solution 2 Drop(s) Both EYES two times a day  aspirin  chewable 81 milliGRAM(s) Oral daily  BACItracin   Ointment 1 Application(s) Topical two times a day  cloBAZam 10 milliGRAM(s) Oral two times a day  folic acid 1 milliGRAM(s) Oral daily  heparin  Injectable 5000 Unit(s) SubCutaneous every 8 hours  lacosamide Solution 100 milliGRAM(s) Oral <User Schedule>  levETIRAcetam  Solution 750 milliGRAM(s) Oral every 12 hours  methylPREDNISolone sodium succinate Injectable 40 milliGRAM(s) IV Push daily  multivitamin 1 Tablet(s) Oral daily  nystatin Powder 1 Application(s) Topical two times a day  OLANZapine 7.5 milliGRAM(s) Oral daily  piperacillin/tazobactam IVPB.. 3.375 Gram(s) IV Intermittent every 8 hours  polyethylene glycol 3350 17 Gram(s) Oral daily  senna Syrup 10 milliLiter(s) Oral at bedtime  valproic  acid Syrup 750 milliGRAM(s) Oral every 8 hours    MEDICATIONS  (PRN):      Objective:  Vital Signs Last 24 Hrs  T(C): 36.6 (02 Nov 2019 06:01), Max: 36.7 (01 Nov 2019 21:54)  T(F): 97.9 (02 Nov 2019 06:01), Max: 98 (01 Nov 2019 21:54)  HR: 68 (02 Nov 2019 06:01) (68 - 76)  BP: 116/54 (02 Nov 2019 06:01) (109/76 - 118/56)  BP(mean): --  RR: 19 (02 Nov 2019 06:01) (19 - 20)  SpO2: 96% (02 Nov 2019 06:01) (92% - 100%)    I&O's Summary    PHYSICAL EXAM:  GENERAL: no acute distress, overweight, nonverbal, on nasal cannula  HEAD: Atraumatic, Normocephalic  EYES: conjunctiva and sclera clear without jaundice  NECK: Supple  NERVOUS SYSTEM: Alert & Oriented X0, Unable to assess strength  CHEST/LUNG: breathing comfortably on 3LNC, no perioral cyanosis, course  HEART: Regular rate and rhythm; S1 and S2; No murmurs, rubs, or gallops  ABDOMEN: Soft, Nontender, Nondistended: Bowel sounds present, PEG tube present, c/d/i  EXTREMITIES: non-pitting edema or dorsal aspect of b/l feet, discoloration of right posterior heel, which is dressed c/d/i with boots on b/l, additionally right elbow is now blanchable, dressed with foam    LABS:                                   9.4    7.07  )-----------( 163      ( 01 Nov 2019 06:05 )             32.6       11-01    146<H>  |  102  |  25<H>  ----------------------------<  88  4.3   |  36<H>  |  0.60    Ca    8.0<L>      01 Nov 2019 06:05  Phos  4.1     11-01  Mg     2.3     11-01                RADIOLOGY & ADDITIONAL TESTS:  Imaging Personally Reviewed:  [x ] YES  [ ] NO  Consultants involved in case:   Consultant(s) Notes Reviewed:  [ x] YES  [ ] NO:   Care Discussed with Consultants/Other Providers [x ] YES  [ ] NO ***************************************************************  Brii Palacios PGY 1  Internal Medicine   Pager: [phone]  [facility] in house pager: [phone]  ***************************************************************    CC: Patient is a 54y old  Female who presents with a chief complaint of Sepsis (26 Oct 2019 09:47)    Subjective:   No acute events overnight. Patient seen and examined this morning. She is awake and alert at this point. ROS cannot be completed because she is non verbal.      MEDICATIONS  (STANDING):  acetaminophen    Suspension .. 650 milliGRAM(s) Oral every 6 hours  albuterol/ipratropium for Nebulization 3 milliLiter(s) Nebulizer every 6 hours  artificial tears (preservative free) Ophthalmic Solution 2 Drop(s) Both EYES two times a day  aspirin  chewable 81 milliGRAM(s) Oral daily  BACItracin   Ointment 1 Application(s) Topical two times a day  cloBAZam 10 milliGRAM(s) Oral two times a day  folic acid 1 milliGRAM(s) Oral daily  heparin  Injectable 5000 Unit(s) SubCutaneous every 8 hours  lacosamide Solution 100 milliGRAM(s) Oral <User Schedule>  levETIRAcetam  Solution 750 milliGRAM(s) Oral every 12 hours  methylPREDNISolone sodium succinate Injectable 40 milliGRAM(s) IV Push daily  multivitamin 1 Tablet(s) Oral daily  nystatin Powder 1 Application(s) Topical two times a day  OLANZapine 7.5 milliGRAM(s) Oral daily  piperacillin/tazobactam IVPB.. 3.375 Gram(s) IV Intermittent every 8 hours  polyethylene glycol 3350 17 Gram(s) Oral daily  senna Syrup 10 milliLiter(s) Oral at bedtime  valproic  acid Syrup 750 milliGRAM(s) Oral every 8 hours    MEDICATIONS  (PRN):    Objective:  Vital Signs Last 24 Hrs  T(C): 36.6 (02 Nov 2019 06:01), Max: 36.7 (01 Nov 2019 21:54)  T(F): 97.9 (02 Nov 2019 06:01), Max: 98 (01 Nov 2019 21:54)  HR: 68 (02 Nov 2019 06:01) (68 - 76)  BP: 116/54 (02 Nov 2019 06:01) (109/76 - 118/56)  BP(mean): --  RR: 19 (02 Nov 2019 06:01) (19 - 20)  SpO2: 96% (02 Nov 2019 06:01) (96% - 100%)    I&O's Summary    PHYSICAL EXAM:  GENERAL: no acute distress, overweight, nonverbal, on RA  HEAD: Atraumatic, Normocephalic  EYES: conjunctiva and sclera clear without jaundice  NECK: Supple  NERVOUS SYSTEM: Alert & Oriented X0, Unable to assess strength  CHEST/LUNG: breathing comfortably on 3LNC, no perioral cyanosis, course  HEART: Regular rate and rhythm; S1 and S2; No murmurs, rubs, or gallops  ABDOMEN: Soft, Nontender, Nondistended: Bowel sounds present, PEG tube present, c/d/i  EXTREMITIES: non-pitting edema or dorsal aspect of b/l feet, discoloration of right posterior heel, which is dressed c/d/i with boots on b/l, additionally right elbow is now blanchable, dressed with foam    LABS:                                   8.9    6.95  )-----------( 157      ( 02 Nov 2019 07:30 )             31.4     11-02    145  |  103  |  21  ----------------------------<  68<L>  4.9   |  33<H>  |  0.59    Ca    7.9<L>      02 Nov 2019 07:30  Phos  4.6     11-02  Mg     2.3     11-02      Culture - Blood (10.31.19 @ 11:00)    Culture - Blood:   ***Blood Panel PCR results on this specimen are available  approximately 3 hours after the Gram stain result***  Gram stain, PCR, and/or culture results may not always  correspond due to difference in methodologies  ------------------------------------------------------------  This PCR assay was performed using [employer].  The  following targets are tested for:  Enterococcus, vancomycin  resistant enterococci, Listeria monocytogenes,  coagulase  negative staphylococci, S. aureus, methicillin resistant S.  aureus, Streptococcus agalactiae (Group B), S. pneumoniae,  S. pyogenes (Group A), Acinetobacter baumannii, Enterobacter  cloacae, E. coli, Klebsiella oxytoca, K. pneumoniae, Proteus  sp., Serratia marcescens, Haemophilus influenzae, Neisseria  meningitidis, Pseudomonas aeruginosa, Candida albicans, C.  glabrata, C. krusei, C. parapsilosis, C. tropicalis and the  KPC resistance gene.  **NOTE: Due to technical problems, Proteus sp. will NOT be  reported as part of the BCID paneluntil further notice.    Culture - Blood:   Insufficient growth, culture re-incubated.    -  Coagulase negative Staphylococcus: + DETECT REGINA    Specimen Source: BLOOD PERIPHERAL    Organism: BLOOD CULTURE PCR  ***Blood Panel PCR results on this specimen are available  approximately 3 hours after the Gram stain result***  Gram stain, PCR, and/or culture results may not always  correspond due to difference in methodologies  ------------------------------------------------------------  This PCR assay was performed using [employer].  The  following targets are tested for:  Enterococcus, vancomycin  resistant enterococci, Listeria monocytogenes,  coagulase  negative staphylococci, S. aureus, methicillin resistant S.  aureus, Streptococcus agalactiae (Group B), S. pneumoniae,  S. pyogenes (Group A), Acinetobacter baumannii, Enterobacter  cloacae, E. coli, Klebsiella oxytoca, K. pneumoniae, Proteus  sp., Serratia marcescens, Haemophilus influenzae, Neisseria  meningitidis, Pseudomonas aeruginosa, Candida albicans, C.  glabrata, C. krusei, C. parapsilosis, C. tropicalis and the  KPC resistance gene.  **NOTE: Due to technical problems, Proteus sp. will NOT be  reported as part of the BCID panel until further notice.    Gram Stain Blood:   ***** CRITICAL RESULT *****  PERSON CALLED / READ-BACK: LATANYA MICHAELS RN/Y  DATE / TIME CALLED: 11/01/19 2023  CALLED BY: LA NENA WILDER  GPCCL^Gram Pos Cocci In Clusters  AFTER: 28 HOURS INCUBATION  BOTTLE: ANAEROBIC BOTTLE    Organism Identification: BLOOD CULTURE PCR    Method Type: PCR      RADIOLOGY & ADDITIONAL TESTS:  Imaging Personally Reviewed:  [x ] YES  [ ] NO  Consultants involved in case:   Consultant(s) Notes Reviewed:  [ x] YES  [ ] NO:   Care Discussed with Consultants/Other Providers [x ] YES  [ ] NO

## 2019-11-02 NOTE — PROGRESS NOTE ADULT - PROBLEM SELECTOR PLAN 1
- currently maintaining temperature 97-98F off Terrence Hugger  - blood cultures are positive for gram positive cocci in clusters  - pending repeat blood cultures  - will continue to monitor  - started 150cc of free water through PEG with feeds

## 2019-11-02 NOTE — PROGRESS NOTE ADULT - PROBLEM SELECTOR PLAN 5
-DVT: sqh q8h  -Diet: NPO except tube feeds, free water through PEG  -Dispo: pending clinical improvement  -GOC: Full code, MOLST in chart  -Mother (Marine Ponce [phone]), HCP contact and updated  -PMD Dr. Shwetha Yanez ([phone])

## 2019-11-02 NOTE — PROGRESS NOTE ADULT - ATTENDING COMMENTS
Parainfluenza infection with likely superimposed bacterial infection - s/p 7 day course of Vanc/Zosyn.  Proteus UTI -s/p 7 day course of Zosyn.  Clinically improving. Hypothermic on 10/31 but now off of jose hugger for 24 hrs. Saturating well on room air.   Repeat blood cultures 10/31 coag negative Staph, follow up repeat cultures today. IF negative, d/c tomorrow/Monday.   Encephalopathy - patient appears more awake now. Would continue to stagger anti-epileptic meds.   Hypoxic respiratory state - titrate off 02 as tolerated. C/w pulmonary suctioning. Now on RA.   Functional quadriplegia - patient needs assistance with all ADLs including feeding and toileting.   Will monitor off antibiotics today, if repeat cultures negative 11/2, will consider DC planning in 1-2 days.   Mother present at bedside, provided an update. Parainfluenza infection with likely superimposed bacterial infection - s/p 7 day course of Vanc/Zosyn.  Proteus UTI -s/p 7 day course of Zosyn.  Clinically improving. Hypothermic on 10/31 but now off of joes hugger for 48 hrs. Saturating well on room air.   Repeat blood cultures 10/31 coag negative Staph, follow up repeat cultures today. IF negative, d/c tomorrow/Monday.   Encephalopathy - patient appears more awake now. Would continue to stagger anti-epileptic meds.   Hypoxic respiratory state - titrate off 02 as tolerated. C/w pulmonary suctioning. Now on RA.   Functional quadriplegia - patient needs assistance with all ADLs including feeding and toileting.   Will monitor off antibiotics today, if repeat cultures negative 11/2, will then discharge if remains clinically/hemodynamically stable.   Mother present at bedside, provided an update.

## 2019-11-03 LAB
-  COAGULASE NEGATIVE STAPHYLOCOCCUS: SIGNIFICANT CHANGE UP
ANION GAP SERPL CALC-SCNC: 8 MMO/L — SIGNIFICANT CHANGE UP (ref 7–14)
BACTERIA BLD CULT: SIGNIFICANT CHANGE UP
BUN SERPL-MCNC: 20 MG/DL — SIGNIFICANT CHANGE UP (ref 7–23)
CALCIUM SERPL-MCNC: 8.6 MG/DL — SIGNIFICANT CHANGE UP (ref 8.4–10.5)
CHLORIDE SERPL-SCNC: 102 MMOL/L — SIGNIFICANT CHANGE UP (ref 98–107)
CO2 SERPL-SCNC: 32 MMOL/L — HIGH (ref 22–31)
CREAT SERPL-MCNC: 0.6 MG/DL — SIGNIFICANT CHANGE UP (ref 0.5–1.3)
GLUCOSE SERPL-MCNC: 77 MG/DL — SIGNIFICANT CHANGE UP (ref 70–99)
HCT VFR BLD CALC: 32.2 % — LOW (ref 34.5–45)
HGB BLD-MCNC: 9.6 G/DL — LOW (ref 11.5–15.5)
MAGNESIUM SERPL-MCNC: 2.3 MG/DL — SIGNIFICANT CHANGE UP (ref 1.6–2.6)
MCHC RBC-ENTMCNC: 29.8 % — LOW (ref 32–36)
MCHC RBC-ENTMCNC: 35 PG — HIGH (ref 27–34)
MCV RBC AUTO: 117.5 FL — HIGH (ref 80–100)
NRBC # FLD: 0.04 K/UL — SIGNIFICANT CHANGE UP (ref 0–0)
ORGANISM # SPEC MICROSCOPIC CNT: SIGNIFICANT CHANGE UP
PHOSPHATE SERPL-MCNC: 4.3 MG/DL — SIGNIFICANT CHANGE UP (ref 2.5–4.5)
PLATELET # BLD AUTO: 178 K/UL — SIGNIFICANT CHANGE UP (ref 150–400)
PMV BLD: 9.1 FL — SIGNIFICANT CHANGE UP (ref 7–13)
POTASSIUM SERPL-MCNC: 4.7 MMOL/L — SIGNIFICANT CHANGE UP (ref 3.5–5.3)
POTASSIUM SERPL-SCNC: 4.7 MMOL/L — SIGNIFICANT CHANGE UP (ref 3.5–5.3)
RBC # BLD: 2.74 M/UL — LOW (ref 3.8–5.2)
RBC # FLD: 17.3 % — HIGH (ref 10.3–14.5)
SODIUM SERPL-SCNC: 142 MMOL/L — SIGNIFICANT CHANGE UP (ref 135–145)
SPECIMEN SOURCE: SIGNIFICANT CHANGE UP
WBC # BLD: 6.98 K/UL — SIGNIFICANT CHANGE UP (ref 3.8–10.5)
WBC # FLD AUTO: 6.98 K/UL — SIGNIFICANT CHANGE UP (ref 3.8–10.5)

## 2019-11-03 PROCEDURE — 99232 SBSQ HOSP IP/OBS MODERATE 35: CPT | Mod: GC

## 2019-11-03 RX ADMIN — CLOBAZAM 10 MILLIGRAM(S): 10 TABLET ORAL at 06:25

## 2019-11-03 RX ADMIN — Medication 2 DROP(S): at 06:26

## 2019-11-03 RX ADMIN — OLANZAPINE 7.5 MILLIGRAM(S): 15 TABLET, FILM COATED ORAL at 12:04

## 2019-11-03 RX ADMIN — LEVETIRACETAM 750 MILLIGRAM(S): 250 TABLET, FILM COATED ORAL at 22:31

## 2019-11-03 RX ADMIN — Medication 1 APPLICATION(S): at 06:26

## 2019-11-03 RX ADMIN — Medication 1 MILLIGRAM(S): at 12:04

## 2019-11-03 RX ADMIN — Medication 650 MILLIGRAM(S): at 08:07

## 2019-11-03 RX ADMIN — HEPARIN SODIUM 5000 UNIT(S): 5000 INJECTION INTRAVENOUS; SUBCUTANEOUS at 15:26

## 2019-11-03 RX ADMIN — Medication 650 MILLIGRAM(S): at 22:32

## 2019-11-03 RX ADMIN — LACOSAMIDE 100 MILLIGRAM(S): 50 TABLET ORAL at 18:11

## 2019-11-03 RX ADMIN — Medication 650 MILLIGRAM(S): at 02:34

## 2019-11-03 RX ADMIN — Medication 750 MILLIGRAM(S): at 15:26

## 2019-11-03 RX ADMIN — LACOSAMIDE 100 MILLIGRAM(S): 50 TABLET ORAL at 12:04

## 2019-11-03 RX ADMIN — LACOSAMIDE 100 MILLIGRAM(S): 50 TABLET ORAL at 06:25

## 2019-11-03 RX ADMIN — Medication 1 TABLET(S): at 12:04

## 2019-11-03 RX ADMIN — Medication 650 MILLIGRAM(S): at 15:27

## 2019-11-03 RX ADMIN — Medication 1 APPLICATION(S): at 18:19

## 2019-11-03 RX ADMIN — HEPARIN SODIUM 5000 UNIT(S): 5000 INJECTION INTRAVENOUS; SUBCUTANEOUS at 22:34

## 2019-11-03 RX ADMIN — NYSTATIN CREAM 1 APPLICATION(S): 100000 CREAM TOPICAL at 18:11

## 2019-11-03 RX ADMIN — Medication 81 MILLIGRAM(S): at 12:04

## 2019-11-03 RX ADMIN — LEVETIRACETAM 750 MILLIGRAM(S): 250 TABLET, FILM COATED ORAL at 10:51

## 2019-11-03 RX ADMIN — Medication 750 MILLIGRAM(S): at 08:07

## 2019-11-03 RX ADMIN — Medication 2 DROP(S): at 18:11

## 2019-11-03 RX ADMIN — NYSTATIN CREAM 1 APPLICATION(S): 100000 CREAM TOPICAL at 06:25

## 2019-11-03 RX ADMIN — HEPARIN SODIUM 5000 UNIT(S): 5000 INJECTION INTRAVENOUS; SUBCUTANEOUS at 06:25

## 2019-11-03 RX ADMIN — CLOBAZAM 10 MILLIGRAM(S): 10 TABLET ORAL at 18:11

## 2019-11-03 NOTE — PROGRESS NOTE ADULT - PROBLEM SELECTOR PLAN 1
- currently maintaining temperature 97-98F off Terrence Hugger  - blood cultures are positive for gram positive cocci in clusters  - pending repeat blood cultures given possible contamination  - will continue to monitor off abx  - started 150cc of free water through PEG with feeds

## 2019-11-03 NOTE — PROGRESS NOTE ADULT - PROBLEM SELECTOR PLAN 5
-DVT: sqh q8h  -Diet: NPO except tube feeds, free water through PEG  -Dispo: pending clinical improvement  -GOC: Full code, MOLST in chart  -Mother (Marine Ponce [phone]), HCP contact and updated  -PMD Dr. Shwetha Yanez ([phone])

## 2019-11-03 NOTE — PROGRESS NOTE ADULT - ATTENDING COMMENTS
Parainfluenza infection with likely superimposed bacterial infection - s/p 7 day course of Vanc/Zosyn.  Proteus UTI -s/p 7 day course of Zosyn.  Clinically improving. Hypothermic on 10/31 but now off of jose hugger for 48 hrs. Saturating well on room air.   Repeat blood cultures 10/31 coag negative Staph, follow up repeat cultures today. IF negative, d/c tomorrow/Monday.   Encephalopathy - patient appears more awake now. Would continue to stagger anti-epileptic meds.   Hypoxic respiratory state - titrate off 02 as tolerated. C/w pulmonary suctioning. Now on RA.   Functional quadriplegia - patient needs assistance with all ADLs including feeding and toileting.   Will monitor off antibiotics today, if repeat cultures negative 11/2 (48h), will then discharge if remains clinically/hemodynamically stable (Mon). Parainfluenza infection with likely superimposed bacterial infection - s/p 7 day course of Vanc/Zosyn.  Proteus UTI -s/p 7 day course of Zosyn.  Repeat blood cultures 10/31 coag negative Staph, follow up repeat cultures today. IF negative, d/c tomorrow/Monday.   Encephalopathy - patient appears more awake now.   Hypoxic respiratory state -C/w pulmonary suctioning. Now on RA.   Functional quadriplegia - patient needs assistance with all ADLs including feeding and toileting.   Will monitor off antibiotics today, if repeat cultures negative 11/2 (48h), will then discharge if remains clinically/hemodynamically stable (Mon).

## 2019-11-03 NOTE — PROGRESS NOTE ADULT - SUBJECTIVE AND OBJECTIVE BOX
Patient is a 54y old  Female who presents with a chief complaint of Sepsis (02 Nov 2019 06:49)      SUBJECTIVE / OVERNIGHT EVENTS: No overnight events. No complaints this AM. Patient denies CP, SOB.  On tele:    MEDICATIONS  (STANDING):  acetaminophen    Suspension .. 650 milliGRAM(s) Oral every 6 hours  artificial tears (preservative free) Ophthalmic Solution 2 Drop(s) Both EYES two times a day  aspirin  chewable 81 milliGRAM(s) Oral daily  BACItracin   Ointment 1 Application(s) Topical two times a day  cloBAZam 10 milliGRAM(s) Oral two times a day  folic acid 1 milliGRAM(s) Oral daily  heparin  Injectable 5000 Unit(s) SubCutaneous every 8 hours  lacosamide Solution 100 milliGRAM(s) Oral <User Schedule>  levETIRAcetam  Solution 750 milliGRAM(s) Oral <User Schedule>  multivitamin 1 Tablet(s) Oral daily  nystatin Powder 1 Application(s) Topical two times a day  OLANZapine 7.5 milliGRAM(s) Oral daily  valproic  acid Syrup 750 milliGRAM(s) Oral <User Schedule>    MEDICATIONS  (PRN):  albuterol/ipratropium for Nebulization 3 milliLiter(s) Nebulizer every 6 hours PRN Respiratory Distress  polyethylene glycol 3350 17 Gram(s) Oral daily PRN Constipation      T(C): 36.3 (11-03-19 @ 06:20), Max: 36.4 (11-02-19 @ 15:47)  HR: 68 (11-03-19 @ 06:20) (65 - 80)  BP: 109/74 (11-03-19 @ 06:20) (109/74 - 116/75)  RR: 18 (11-03-19 @ 06:20) (18 - 20)  SpO2: 99% (11-03-19 @ 06:20) (94% - 99%)    PHYSICAL EXAM  GENERAL: NAD, well-developed  NEURO: AO x3, PERRLA, EOMI, motor strength in tact in 4/4 extremities, sensation in tact  HEAD:  Atraumatic, Normocephalic  EYES: conjunctiva and sclera clear  NECK: Supple, No JVD, no lymphadenopathy, no thyromegaly  CHEST/LUNG: Clear to auscultation bilaterally; No wheezes, rales or rhonchi  HEART: Regular rate and rhythm; No murmurs, rubs, or gallops  ABDOMEN: Soft, Nontender, Nondistended; Bowel sounds present, no masses.  EXTREMITIES:  2+ Peripheral Pulses, No clubbing, cyanosis, or edema  SKIN: Warm, dry, in tact, no rashes or lesions  PSYCH: affect appropriate    LABS:                        8.9    6.95  )-----------( 157      ( 02 Nov 2019 07:30 )             31.4     11-02    145  |  103  |  21  ----------------------------<  68<L>  4.9   |  33<H>  |  0.59    Ca    7.9<L>      02 Nov 2019 07:30  Phos  4.6     11-02  Mg     2.3     11-02              I&O's Summary    02 Nov 2019 08:01  -  03 Nov 2019 07:00  --------------------------------------------------------  IN: 150 mL / OUT: 0 mL / NET: 150 mL        Tonia Guerrero MD  Internal Medicine Resident, PGY1  Pager: [phone] / [phone] Patient is a 54y old  Female who presents with a chief complaint of Sepsis (02 Nov 2019 06:49)      SUBJECTIVE / OVERNIGHT EVENTS: No overnight events. ROS unable to be assessed as she is nonverbal.    MEDICATIONS  (STANDING):  acetaminophen    Suspension .. 650 milliGRAM(s) Oral every 6 hours  artificial tears (preservative free) Ophthalmic Solution 2 Drop(s) Both EYES two times a day  aspirin  chewable 81 milliGRAM(s) Oral daily  BACItracin   Ointment 1 Application(s) Topical two times a day  cloBAZam 10 milliGRAM(s) Oral two times a day  folic acid 1 milliGRAM(s) Oral daily  heparin  Injectable 5000 Unit(s) SubCutaneous every 8 hours  lacosamide Solution 100 milliGRAM(s) Oral <User Schedule>  levETIRAcetam  Solution 750 milliGRAM(s) Oral <User Schedule>  multivitamin 1 Tablet(s) Oral daily  nystatin Powder 1 Application(s) Topical two times a day  OLANZapine 7.5 milliGRAM(s) Oral daily  valproic  acid Syrup 750 milliGRAM(s) Oral <User Schedule>    MEDICATIONS  (PRN):  albuterol/ipratropium for Nebulization 3 milliLiter(s) Nebulizer every 6 hours PRN Respiratory Distress  polyethylene glycol 3350 17 Gram(s) Oral daily PRN Constipation      T(C): 36.3 (11-03-19 @ 06:20), Max: 36.4 (11-02-19 @ 15:47)  HR: 68 (11-03-19 @ 06:20) (65 - 80)  BP: 109/74 (11-03-19 @ 06:20) (109/74 - 116/75)  RR: 18 (11-03-19 @ 06:20) (18 - 20)  SpO2: 99% (11-03-19 @ 06:20) (94% - 99%)    PHYSICAL EXAM  GENERAL: NAD, well-developed  NEURO: Alert  HEAD:  Atraumatic, Normocephalic  EYES: conjunctiva and sclera clear  NECK: Supple  CHEST/LUNG: Clear to auscultation bilaterally; No wheezes, rales or rhonchi  HEART: Regular rate and rhythm; No murmurs, rubs, or gallops  ABDOMEN: Soft, Nontender, Nondistended; Bowel sounds present, no masses.  EXTREMITIES:  2+ Peripheral Pulses, No clubbing, cyanosis, or edema  SKIN: Warm, dry, in tact, no rashes or lesions    LABS:                        8.9    6.95  )-----------( 157      ( 02 Nov 2019 07:30 )             31.4     11-02    145  |  103  |  21  ----------------------------<  68<L>  4.9   |  33<H>  |  0.59    Ca    7.9<L>      02 Nov 2019 07:30  Phos  4.6     11-02  Mg     2.3     11-02              I&O's Summary    02 Nov 2019 08:01  -  03 Nov 2019 07:00  --------------------------------------------------------  IN: 150 mL / OUT: 0 mL / NET: 150 mL        Tonia Guerrero MD  Internal Medicine Resident, PGY3  Pager: [phone] / [phone]

## 2019-11-03 NOTE — PROGRESS NOTE ADULT - ASSESSMENT
54F w/ CP and seizures, presenting for fevers, tachycardia, and hypoxia to 80s w/ recent hospitalization requiring MICU pressor support, intubation for Pseudomonas and MRSA PNA, now w/ PEG tube, now admitted for sepsis 2/2 parainfluenza virus and proteus UTI. Currently on BS abx given increased risk for HAP and aspiration PNA. HC c/b hypothermia and hypotension, now on solumedrol, duonebs ATC and IVF. Hypothermia has resolved, but the patient is intermittently hypotensive, but responsive to fluids. Because she had an episode of hypothermia and hypotension on 10/31, blood cultures were sent and positive for gram positive cocci in clusters, possible contaminant, pending repeat blood cx

## 2019-11-04 ENCOUNTER — TRANSCRIPTION ENCOUNTER (OUTPATIENT)
Age: 54
End: 2019-11-04

## 2019-11-04 VITALS
RESPIRATION RATE: 17 BRPM | TEMPERATURE: 97 F | SYSTOLIC BLOOD PRESSURE: 102 MMHG | DIASTOLIC BLOOD PRESSURE: 81 MMHG | HEART RATE: 93 BPM | OXYGEN SATURATION: 94 %

## 2019-11-04 LAB
ANION GAP SERPL CALC-SCNC: 10 MMO/L — SIGNIFICANT CHANGE UP (ref 7–14)
BUN SERPL-MCNC: 21 MG/DL — SIGNIFICANT CHANGE UP (ref 7–23)
CALCIUM SERPL-MCNC: 8.3 MG/DL — LOW (ref 8.4–10.5)
CHLORIDE SERPL-SCNC: 104 MMOL/L — SIGNIFICANT CHANGE UP (ref 98–107)
CO2 SERPL-SCNC: 21 MMOL/L — LOW (ref 22–31)
CREAT SERPL-MCNC: 0.59 MG/DL — SIGNIFICANT CHANGE UP (ref 0.5–1.3)
GLUCOSE SERPL-MCNC: SIGNIFICANT CHANGE UP MG/DL (ref 70–99)
MAGNESIUM SERPL-MCNC: SIGNIFICANT CHANGE UP MG/DL (ref 1.6–2.6)
PHOSPHATE SERPL-MCNC: SIGNIFICANT CHANGE UP MG/DL (ref 2.5–4.5)
POTASSIUM SERPL-MCNC: SIGNIFICANT CHANGE UP MMOL/L (ref 3.5–5.3)
POTASSIUM SERPL-SCNC: SIGNIFICANT CHANGE UP MMOL/L (ref 3.5–5.3)
SODIUM SERPL-SCNC: 135 MMOL/L — SIGNIFICANT CHANGE UP (ref 135–145)

## 2019-11-04 PROCEDURE — 99239 HOSP IP/OBS DSCHRG MGMT >30: CPT

## 2019-11-04 RX ADMIN — Medication 2 DROP(S): at 05:12

## 2019-11-04 RX ADMIN — LACOSAMIDE 100 MILLIGRAM(S): 50 TABLET ORAL at 05:17

## 2019-11-04 RX ADMIN — Medication 1 TABLET(S): at 11:12

## 2019-11-04 RX ADMIN — Medication 650 MILLIGRAM(S): at 03:51

## 2019-11-04 RX ADMIN — Medication 650 MILLIGRAM(S): at 15:28

## 2019-11-04 RX ADMIN — Medication 1 APPLICATION(S): at 05:17

## 2019-11-04 RX ADMIN — OLANZAPINE 7.5 MILLIGRAM(S): 15 TABLET, FILM COATED ORAL at 11:12

## 2019-11-04 RX ADMIN — Medication 650 MILLIGRAM(S): at 09:50

## 2019-11-04 RX ADMIN — NYSTATIN CREAM 1 APPLICATION(S): 100000 CREAM TOPICAL at 05:12

## 2019-11-04 RX ADMIN — Medication 750 MILLIGRAM(S): at 00:42

## 2019-11-04 RX ADMIN — HEPARIN SODIUM 5000 UNIT(S): 5000 INJECTION INTRAVENOUS; SUBCUTANEOUS at 13:45

## 2019-11-04 RX ADMIN — Medication 750 MILLIGRAM(S): at 15:28

## 2019-11-04 RX ADMIN — LEVETIRACETAM 750 MILLIGRAM(S): 250 TABLET, FILM COATED ORAL at 09:50

## 2019-11-04 RX ADMIN — HEPARIN SODIUM 5000 UNIT(S): 5000 INJECTION INTRAVENOUS; SUBCUTANEOUS at 05:13

## 2019-11-04 RX ADMIN — Medication 81 MILLIGRAM(S): at 11:12

## 2019-11-04 RX ADMIN — Medication 1 MILLIGRAM(S): at 11:12

## 2019-11-04 RX ADMIN — Medication 750 MILLIGRAM(S): at 08:37

## 2019-11-04 RX ADMIN — CLOBAZAM 10 MILLIGRAM(S): 10 TABLET ORAL at 05:17

## 2019-11-04 RX ADMIN — LACOSAMIDE 100 MILLIGRAM(S): 50 TABLET ORAL at 11:12

## 2019-11-04 NOTE — PROGRESS NOTE ADULT - ATTENDING COMMENTS
Patient seen and examined. Agree with above note by resident.    Clinically stable. DC today. Time spent 35 mins

## 2019-11-04 NOTE — DISCHARGE NOTE PROVIDER - HOSPITAL COURSE
54yoF w/ PMHx of Cerebral palsy and seizures w/ recent hospitalization requiring MICU stay for aspiration pneumonia requiring intubation, pressors, and PEG tube, now presenting for sepsis most likely 2/2 to viral infection. Information gathered from chart since patient is baseline nonverbal and functionally quadriplegic. Patient presented with fever, tachycardic with hypoxia to 80s on 2L NC recorded at nursing home. RVP was positive for parainfluenza virus, but patient has history of Pseudomonas and MRSA.         In the ED vitals were Tmax of 101.4, HR up to 129, /54, tachypneic to 24, w/ O2 sat of 96 on 2L NC. EKG reviewed, showed sinus tach. CXR showed no acute pulmonary process. Labs were notable for leukocytosis to 12.47, lactate of 2.3, which decreased to 1.9. UA was positive for nitrites and leuk esterase and WBCs. As noted, RVP + parainfluenza virus 4.     She was given ceftriaxone and azithromycin, s/p 1L NS bolus.          Patient was placed on nasal cannula and given duonebs around the clock. RVP was positive for parainfluenza virus, but patient has history of Pseudomonas and MRSA so she was started on vanc/zosyn. Urine cultures grew Proteus sensitive to zosyn. Hospitalization complicated by episode of hypothermia with rectal temps around 93 despite warming blanket. Patient additionally was hypotensive, though responded to 2L NS. POCUS was performed which did not show evidence of fluid in the lungs, though patient still had significant wheezing despite duoneb treatment and patient was given stress dose of solumedrol w/ subsequent 5 day course. Patient was subsequently stabilized and her temperature remained above 97 degrees without use of jose hugger. She continued to have rhonchi in all lung fields, with shallow breaths, and had oxygen saturation in the low 90s on room air, but high 90s-100% on 2L NC. The patient was subsequently weaned. There was a concern that perhaps she was lethargic and not taking deep breaths secondary to 4 anti-epileptic drugs, and therefore we staggered the timing and she was much more awake and alert. She completed her 7 day course of vancomycin and zosyn and appears to be more awake and alert.         Course has also been complicated by intermittent episodes of hypothermia (93F) and hypotensive to the SBP high 80s. Blood cultures were taken again, which were subsequently negative. Soft blood pressures responded well with 500cc NS bolus. Otherwise, the patient was weaned off nasal cannula and breathing comfortably on RA. Physical exam was indicative of clear lung sounds b/l and a PEG tube which was c/d/i. She is awake and alert, able to mumble and grunt. She is hemodynamically stable, completed her 7 day course of vancomycin and zosyn, and is ready for a safe medical discharge to nursing facility with close medical follow up.

## 2019-11-04 NOTE — DISCHARGE NOTE PROVIDER - NSDCCPCAREPLAN_GEN_ALL_CORE_FT
PRINCIPAL DISCHARGE DIAGNOSIS  Diagnosis: Sepsis  Assessment and Plan of Treatment: You were admitted to the hospital because you had were unstable and required supplemental oxygen to help you breathe. We found that you had a viral infection called Parainfluenza causing you to be sick. But because you were recently admitted to the medical ICU, we felt that it was necessary that you recieve antibiotics to help cover for multi-drug resistant bugs, such as MRSA and Pseudomonas. Your hospital course was complicated by hypoxia, requiring additional oxygen through nasal cannula. We titrated the concentration of oxygen every day and you were eventually weaned off of the extra oxygen. Also, during [facility] hospitalization, you had received chest physiotherapy, mucomyst and frequent nasal and oral suctioning to clear the mucous out of your system. If you experience fevers, chills, nausea, vomiting, difficulty breathing or changes in behavior, please return to the emergency room. Please take all your medications as prescribed.      SECONDARY DISCHARGE DIAGNOSES  Diagnosis: UTI (urinary tract infection)  Assessment and Plan of Treatment: While you were in the hospital we found that you had a urinary tract infection in addition to your viral infection (Parainfluenza). We treated you with 7 days of Zosyn, which is a strong antibiotic. Should you have symptoms of pain on urination, increased frequency of urination or fevers, chills, please return to the emergency room.    Diagnosis: Seizure disorder  Assessment and Plan of Treatment: You were diagnosed with seizure disorder from before this hospitalization. It seems that you are on 4 anti-epileptic agents. During this hospitalization you were getting very lethargic and not taking in adequate breaths, and therefore we staggered the timing of your medications. We would like for you to keep the same schedule when you go back to the nursing home because this will ensure that you will awake and alert. Should you have seizures, or become too lethargic, pleae return to the emeregncy room. And as always please take your medications as prescribed and follow with outpatient neurologist.

## 2019-11-04 NOTE — PROVIDER CONTACT NOTE (OTHER) - BACKGROUND
Pt admitted with UTI. PMH Cerebral palsy
Pt admitted with UTI. PMH Intellectual disability, Cerebral palsy, Seizure disorder, Constipation
Dx: sepsis  PMH: cerebral palsy, seizure d/o
Pt admitted with UTI. PMH of CP, seizure disorder.
Pt admitted with UTI. PMH Intellectual disability, Cerebral Palsy, Seizure disorder, Constipation
Pt admitted with UTI. PMH Intellectual disability, Cerebral palsy, Seizure disorder, Constipation
pt is 54 year old female admit diagnosisUTI
patient admitted for sepsis. patient has hx of being hypotensive this admission.
Pt admitted with a UTI
patient admitted for sepsis. patient on hypothermia blanket until this morning. temp 1 hour after hypothermia blanket d/ron 97.1.

## 2019-11-04 NOTE — DISCHARGE NOTE PROVIDER - CARE PROVIDER_API CALL
Esperanza Connolly  [address] [place] [address]  Phone: [phone]  Fax: (   )    -  Follow Up Time:
Esperanza Connolly  [address] [address]  Phone: [phone]  Fax: (   )    -  Follow Up Time:

## 2019-11-04 NOTE — DISCHARGE NOTE PROVIDER - NSDCMRMEDTOKEN_GEN_ALL_CORE_FT
albuterol 2.5 mg/3 mL (0.083%) inhalation solution: 3 milliliter(s) inhaled every 4 hours  Artificial Tears ophthalmic solution: 1 drop(s) in each eye 2 times a day  aspirin 81 mg oral tablet, chewable: 1 tab(s) by gastrostomy tube once a day  B-Complex 50 oral tablet: 1 tab(s) by gastrostomy tube once a day  cloBAZam 10 mg oral tablet: 1 tab(s) orally 2 times a day via PEG tube MDD:2 tabs  folic acid 1 mg oral tablet: 1 tab(s) by gastrostomy tube once a day  lacosamide 10 mg/mL oral solution: 10 milliliter(s) by gastrostomy tube every 8 hours  levETIRAcetam 100 mg/mL oral solution: 7.5 milliliter(s) by gastrostomy tube 2 times a day  MiraLax oral powder for reconstitution: 17 gram(s) by gastrostomy tube once a day  Multiple Vitamins oral liquid: 15 milliliter(s) by gastrostomy tube once a day  nystatin 100,000 units/g topical cream: Apply to perineal groin &amp; buttock topically every shift for fungal  OLANZapine 7.5 mg oral tablet: 1 tab(s) by gastrostomy tube once a day  Senna 8.8 mg/5 mL oral syrup: 10 milliliter(s) by gastrostomy tube once a day (at bedtime)  valproic acid 250 mg/5 mL oral liquid: 15 milliliter(s) by gastrostomy tube every 8 hours

## 2019-11-04 NOTE — PROGRESS NOTE ADULT - PROBLEM SELECTOR PLAN 5
-DVT: sqh q8h  -Diet: NPO except tube feeds, free water through PEG  -Dispo: pending clinical improvement  -GOC: Full code, MOLST in chart  -Mother (Marine Ponce [phone]), HCP contact and updated  -PMD Dr. Shwetha Yanez ([phone])

## 2019-11-04 NOTE — PROGRESS NOTE ADULT - PROVIDER SPECIALTY LIST ADULT
Internal Medicine
Internal Medicine
Podiatry
Podiatry
Internal Medicine

## 2019-11-04 NOTE — DISCHARGE NOTE PROVIDER - NSDCQMCOGNITION_NEU_ALL_CORE
Difficulty concentrating/Difficulty making decisions/Difficulty remembering
Difficulty concentrating/Difficulty making decisions/Difficulty remembering

## 2019-11-04 NOTE — PROVIDER CONTACT NOTE (OTHER) - NAME OF MD/NP/PA/DO NOTIFIED:
MD Phillips
MD Wells
MD dunaway
MD Toscano
Brii Palacios MD
Jadon Sauer MD
MD Wells
MD Landeros
Md Palacios
MD Landeros

## 2019-11-04 NOTE — PROGRESS NOTE ADULT - ASSESSMENT
54F w/ CP and seizures, presenting for fevers, tachycardia, and hypoxia to 80s w/ recent hospitalization requiring MICU pressor support, intubation for Pseudomonas and MRSA PNA, now w/ PEG tube, now admitted for sepsis 2/2 parainfluenza virus and proteus UTI. Currently on BS abx given increased risk for HAP and aspiration PNA. HC c/b hypothermia and hypotension, now on solumedrol, duonebs ATC and IVF. Hypothermia has resolved, but the patient is intermittently hypotensive, but responsive to fluids. Because she had an episode of hypothermia and hypotension on 10/31, blood cultures were sent and positive for gram positive cocci in clusters. Repeat blood cultures are negative, most likely representing contaminant. Patient is now hemodynamically stable, breathing comfortably on RA, ready for safe medical discharge.

## 2019-11-04 NOTE — DISCHARGE NOTE PROVIDER - NSDCCPTREATMENT_GEN_ALL_CORE_FT
PRINCIPAL PROCEDURE  Procedure: X-ray, chest, 1 view  Findings and Treatment: INTERPRETATION:   Study is limited since it is rotated into a steep [place] projection. Lungs   are free of focal abnormalities.  Left hemidiaphragm obscured laterally may represent small effusion and/or   atelectasis.  COMPARISON:  September 3 with similar haziness at the left lung base and   CT abdomen April 23, 2020 2019 with similar appearance.  IMPRESSION:    1. No acute pulmonary pathology.  2. Hazy left lung base laterally representing epicardial fat and   dependent atelectasis on abdominal CT of April.
PRINCIPAL PROCEDURE  Procedure: X-ray, chest, 1 view  Findings and Treatment: INTERPRETATION:   Study is limited since it is rotated into a steep [place] projection. Lungs   are free of focal abnormalities.  Left hemidiaphragm obscured laterally may represent small effusion and/or   atelectasis.  COMPARISON:  September 3 with similar haziness at the left lung base and   CT abdomen April 23, 2020 2019 with similar appearance.  IMPRESSION:    1. No acute pulmonary pathology.  2. Hazy left lung base laterally representing epicardial fat and   dependent atelectasis on abdominal CT of April.

## 2019-11-04 NOTE — DISCHARGE NOTE PROVIDER - NSDCFUADDINST_GEN_ALL_CORE_FT
Podiatry Discharge Instructions:  Follow up: Please follow up with Dr. Zamora within 1 week of discharge from the hospital, please call [phone] for appointment and discuss that you recently were seen in the hospital.  Wound Care: Please make daily dressing change to right heel wound with 4x4 gauze, ABD pads and Kerlix.  Weight bearing: To wear offloading z flow boots or heel cushions at all times when bed bound.  Antibiotics: Please continue as instructed.
Podiatry Discharge Instructions:  Follow up: Please follow up with Dr. Zamora within 1 week of discharge from the hospital, please call [phone] for appointment and discuss that you recently were seen in the hospital.  Wound Care: Please make daily dressing change to right heel wound with 4x4 gauze, ABD pads and Kerlix.  Weight bearing: To wear offloading z flow boots or heel cushions at all times when bed bound.  Antibiotics: Please continue as instructed.

## 2019-11-04 NOTE — DISCHARGE NOTE PROVIDER - PROVIDER TOKENS
FREE:[LAST:[Mohsen],FIRST:[Esperanza],PHONE:[phone]],FAX:[(   )    -],ADDRESS:[[address] [place] [address]]]
FREE:[LAST:[Mohsen],FIRST:[Esperanza],PHONE:[phone]:[(   )    -],ADDRESS:[[address] [place] [address]]]

## 2019-11-04 NOTE — DISCHARGE NOTE NURSING/CASE MANAGEMENT/SOCIAL WORK - PATIENT PORTAL LINK FT
You can access the FollowMyHealth Patient Portal offered by [facility] by registering at the following website: [email or web address]. By joining [facility]’s FollowMyHealth portal, you will also be able to view your health information using other applications (apps) compatible with our system.

## 2019-11-04 NOTE — PROVIDER CONTACT NOTE (OTHER) - DATE AND TIME:
25-Oct-2019 11:28
04-Nov-2019 07:55
25-Oct-2019 14:41
28-Oct-2019 10:45
01-Nov-2019 05:38
31-Oct-2019 09:00
29-Oct-2019 08:10
25-Oct-2019 18:00
28-Oct-2019 05:50
30-Oct-2019 20:20

## 2019-11-05 LAB — BACTERIA BLD CULT: SIGNIFICANT CHANGE UP

## 2019-11-06 NOTE — PROGRESS NOTE ADULT - PROBLEM SELECTOR PROBLEM 8
Cerebral palsy Scribe Attestation (For Scribes USE Only)... Scribe Attestation (For Scribes USE Only).../Attending Attestation (For Attendings USE Only)...

## 2019-11-07 LAB — BACTERIA BLD CULT: SIGNIFICANT CHANGE UP

## 2019-11-08 ENCOUNTER — APPOINTMENT (OUTPATIENT)
Dept: NEUROLOGY | Facility: CLINIC | Age: 54
End: 2019-11-08

## 2019-12-18 NOTE — PATIENT PROFILE ADULT. - CAREGIVER ADDRESS
1930: Bedside and Verbal shift change report given to Cheryl Ruby RN (oncoming nurse) by Bobbi Chacon RN (offgoing nurse). Report included the following information SBAR, Kardex, MAR, and Accordion. 0200: Notified by Tele that pt's HR went into the 140's and immediately came back down. Pt's VS stable and pt has no complaints. Pt's HR went back into the 140's one more time. Orders received to obtain an EKG. Results discussed with Dr. Debbie Mccarthy, orders received for AM labs. 0730: Bedside and Verbal shift change report given to Tyson Landry RN (oncoming nurse) by Cheryl Ruby RN (offgoing nurse). Report included the following information SBAR, Kardex, MAR and Accordion. Problem: Falls - Risk of  Goal: *Absence of Falls  Description  Document Natanael Alana Fall Risk and appropriate interventions in the flowsheet.   Outcome: Progressing Towards Goal  Note: Fall Risk Interventions:  Mobility Interventions: Bed/chair exit alarm, Patient to call before getting OOB, Communicate number of staff needed for ambulation/transfer    Mentation Interventions: Bed/chair exit alarm, Adequate sleep, hydration, pain control, Door open when patient unattended    Medication Interventions: Bed/chair exit alarm, Patient to call before getting OOB, Teach patient to arise slowly    Elimination Interventions: Call light in reach, Bed/chair exit alarm, Patient to call for help with toileting needs    History of Falls Interventions: Door open when patient unattended, Room close to nurse's station, Utilize gait belt for transfer/ambulation [id number] [place] [id number]

## 2020-01-07 NOTE — PROVIDER CONTACT NOTE (CRITICAL VALUE NOTIFICATION) - TEST AND RESULT REPORTED:
PA:  Amphetamine-Dextroamphetamine 30mg tablets TID    [facility] key:   [id number]  Case ID:    [id number]  [facility]  ----------------------------  1/7/2020:  Determination:   DENIED for TID  Patient's benefit plan has a quantity limit of 2 tablets per day. The medication is APPROVED for 2 tablets per day and the pharmacy should have no problem processing medication at that quantity.  
Potassium 2.9
sputum 9/6 + MRSA

## 2020-05-18 NOTE — ED ADULT NURSE NOTE - NS ED NOTE ABUSE RESPONSE YN
Unable to assess due to medical condition Stelara Counseling:  I discussed with the patient the risks of ustekinumab including but not limited to immunosuppression, malignancy, posterior leukoencephalopathy syndrome, and serious infections.  The patient understands that monitoring is required including a PPD at baseline and must alert us or the primary physician if symptoms of infection or other concerning signs are noted.

## 2020-06-08 ENCOUNTER — EMERGENCY (EMERGENCY)
Facility: HOSPITAL | Age: 55
LOS: 1 days | Discharge: HOME | End: 2020-06-08
Attending: EMERGENCY MEDICINE
Payer: MEDICARE

## 2020-06-08 VITALS — SYSTOLIC BLOOD PRESSURE: 99 MMHG | DIASTOLIC BLOOD PRESSURE: 79 MMHG

## 2020-06-08 LAB
ALBUMIN SERPL ELPH-MCNC: 3.7 G/DL — SIGNIFICANT CHANGE UP (ref 3.3–5)
ALP SERPL-CCNC: 68 U/L — SIGNIFICANT CHANGE UP (ref 40–120)
ALT FLD-CCNC: 19 U/L — SIGNIFICANT CHANGE UP (ref 10–45)
ANION GAP SERPL CALC-SCNC: 13 MMOL/L — SIGNIFICANT CHANGE UP (ref 5–17)
ANION GAP SERPL CALC-SCNC: 14 MMOL/L — SIGNIFICANT CHANGE UP (ref 5–17)
APPEARANCE UR: ABNORMAL
AST SERPL-CCNC: 57 U/L — HIGH (ref 10–40)
BACTERIA # UR AUTO: ABNORMAL
BASOPHILS # BLD AUTO: 0.05 K/UL — SIGNIFICANT CHANGE UP (ref 0–0.2)
BASOPHILS NFR BLD AUTO: 0.6 % — SIGNIFICANT CHANGE UP (ref 0–2)
BILIRUB SERPL-MCNC: 0.3 MG/DL — SIGNIFICANT CHANGE UP (ref 0.2–1.2)
BILIRUB UR-MCNC: NEGATIVE — SIGNIFICANT CHANGE UP
BUN SERPL-MCNC: 12 MG/DL — SIGNIFICANT CHANGE UP (ref 7–23)
BUN SERPL-MCNC: 14 MG/DL — SIGNIFICANT CHANGE UP (ref 7–23)
CALCIUM SERPL-MCNC: 8.7 MG/DL — SIGNIFICANT CHANGE UP (ref 8.4–10.5)
CALCIUM SERPL-MCNC: 8.9 MG/DL — SIGNIFICANT CHANGE UP (ref 8.4–10.5)
CHLORIDE SERPL-SCNC: 100 MMOL/L — SIGNIFICANT CHANGE UP (ref 96–108)
CHLORIDE SERPL-SCNC: 97 MMOL/L — SIGNIFICANT CHANGE UP (ref 96–108)
CO2 SERPL-SCNC: 22 MMOL/L — SIGNIFICANT CHANGE UP (ref 22–31)
CO2 SERPL-SCNC: 23 MMOL/L — SIGNIFICANT CHANGE UP (ref 22–31)
COLOR SPEC: YELLOW — SIGNIFICANT CHANGE UP
CREAT SERPL-MCNC: 0.44 MG/DL — LOW (ref 0.5–1.3)
CREAT SERPL-MCNC: 0.44 MG/DL — LOW (ref 0.5–1.3)
DIFF PNL FLD: NEGATIVE — SIGNIFICANT CHANGE UP
EOSINOPHIL # BLD AUTO: 0.36 K/UL — SIGNIFICANT CHANGE UP (ref 0–0.5)
EOSINOPHIL NFR BLD AUTO: 4.1 % — SIGNIFICANT CHANGE UP (ref 0–6)
EPI CELLS # UR: 3 — SIGNIFICANT CHANGE UP
GAS PNL BLDV: SIGNIFICANT CHANGE UP
GAS PNL BLDV: SIGNIFICANT CHANGE UP
GLUCOSE SERPL-MCNC: 79 MG/DL — SIGNIFICANT CHANGE UP (ref 70–99)
GLUCOSE SERPL-MCNC: 90 MG/DL — SIGNIFICANT CHANGE UP (ref 70–99)
GLUCOSE UR QL: NEGATIVE — SIGNIFICANT CHANGE UP
HCT VFR BLD CALC: 38.2 % — SIGNIFICANT CHANGE UP (ref 34.5–45)
HGB BLD-MCNC: 12.3 G/DL — SIGNIFICANT CHANGE UP (ref 11.5–15.5)
HYALINE CASTS # UR AUTO: 10 /LPF — HIGH (ref 0–7)
IMM GRANULOCYTES NFR BLD AUTO: 0.3 % — SIGNIFICANT CHANGE UP (ref 0–1.5)
KETONES UR-MCNC: NEGATIVE — SIGNIFICANT CHANGE UP
LEUKOCYTE ESTERASE UR-ACNC: ABNORMAL
LIDOCAIN IGE QN: 38 U/L — SIGNIFICANT CHANGE UP (ref 7–60)
LYMPHOCYTES # BLD AUTO: 2.68 K/UL — SIGNIFICANT CHANGE UP (ref 1–3.3)
LYMPHOCYTES # BLD AUTO: 30.4 % — SIGNIFICANT CHANGE UP (ref 13–44)
MCHC RBC-ENTMCNC: 31.1 PG — SIGNIFICANT CHANGE UP (ref 27–34)
MCHC RBC-ENTMCNC: 32.2 GM/DL — SIGNIFICANT CHANGE UP (ref 32–36)
MCV RBC AUTO: 96.7 FL — SIGNIFICANT CHANGE UP (ref 80–100)
MONOCYTES # BLD AUTO: 1.06 K/UL — HIGH (ref 0–0.9)
MONOCYTES NFR BLD AUTO: 12 % — SIGNIFICANT CHANGE UP (ref 2–14)
NEUTROPHILS # BLD AUTO: 4.63 K/UL — SIGNIFICANT CHANGE UP (ref 1.8–7.4)
NEUTROPHILS NFR BLD AUTO: 52.6 % — SIGNIFICANT CHANGE UP (ref 43–77)
NITRITE UR-MCNC: NEGATIVE — SIGNIFICANT CHANGE UP
NRBC # BLD: 0 /100 WBCS — SIGNIFICANT CHANGE UP (ref 0–0)
PH UR: 8 — SIGNIFICANT CHANGE UP (ref 5–8)
PLATELET # BLD AUTO: 215 K/UL — SIGNIFICANT CHANGE UP (ref 150–400)
POTASSIUM SERPL-MCNC: 5 MMOL/L — SIGNIFICANT CHANGE UP (ref 3.5–5.3)
POTASSIUM SERPL-MCNC: 6.4 MMOL/L — CRITICAL HIGH (ref 3.5–5.3)
POTASSIUM SERPL-SCNC: 5 MMOL/L — SIGNIFICANT CHANGE UP (ref 3.5–5.3)
POTASSIUM SERPL-SCNC: 6.4 MMOL/L — CRITICAL HIGH (ref 3.5–5.3)
PROT SERPL-MCNC: 7.8 G/DL — SIGNIFICANT CHANGE UP (ref 6–8.3)
PROT UR-MCNC: ABNORMAL
RBC # BLD: 3.95 M/UL — SIGNIFICANT CHANGE UP (ref 3.8–5.2)
RBC # FLD: 13 % — SIGNIFICANT CHANGE UP (ref 10.3–14.5)
RBC CASTS # UR COMP ASSIST: 1 /HPF — SIGNIFICANT CHANGE UP (ref 0–4)
SARS-COV-2 RNA SPEC QL NAA+PROBE: SIGNIFICANT CHANGE UP
SODIUM SERPL-SCNC: 134 MMOL/L — LOW (ref 135–145)
SODIUM SERPL-SCNC: 135 MMOL/L — SIGNIFICANT CHANGE UP (ref 135–145)
SP GR SPEC: 1.02 — SIGNIFICANT CHANGE UP (ref 1.01–1.02)
UROBILINOGEN FLD QL: NEGATIVE — SIGNIFICANT CHANGE UP
WBC # BLD: 8.81 K/UL — SIGNIFICANT CHANGE UP (ref 3.8–10.5)
WBC # FLD AUTO: 8.81 K/UL — SIGNIFICANT CHANGE UP (ref 3.8–10.5)
WBC UR QL: 123 /HPF — HIGH (ref 0–5)

## 2020-06-08 PROCEDURE — 71260 CT THORAX DX C+: CPT | Mod: 26

## 2020-06-08 PROCEDURE — 71260 CT THORAX DX C+: CPT

## 2020-06-08 PROCEDURE — 71045 X-RAY EXAM CHEST 1 VIEW: CPT | Mod: 26

## 2020-06-08 PROCEDURE — 81001 URINALYSIS AUTO W/SCOPE: CPT

## 2020-06-08 PROCEDURE — 74177 CT ABD & PELVIS W/CONTRAST: CPT | Mod: 26

## 2020-06-08 PROCEDURE — 83690 ASSAY OF LIPASE: CPT

## 2020-06-08 PROCEDURE — 80053 COMPREHEN METABOLIC PANEL: CPT

## 2020-06-08 PROCEDURE — 71045 X-RAY EXAM CHEST 1 VIEW: CPT

## 2020-06-08 PROCEDURE — 96372 THER/PROPH/DIAG INJ SC/IM: CPT | Mod: XU

## 2020-06-08 PROCEDURE — 87086 URINE CULTURE/COLONY COUNT: CPT

## 2020-06-08 PROCEDURE — 96375 TX/PRO/DX INJ NEW DRUG ADDON: CPT | Mod: XU

## 2020-06-08 PROCEDURE — 80048 BASIC METABOLIC PNL TOTAL CA: CPT

## 2020-06-08 PROCEDURE — 96374 THER/PROPH/DIAG INJ IV PUSH: CPT | Mod: XU

## 2020-06-08 PROCEDURE — 85027 COMPLETE CBC AUTOMATED: CPT

## 2020-06-08 PROCEDURE — 74177 CT ABD & PELVIS W/CONTRAST: CPT

## 2020-06-08 PROCEDURE — 99284 EMERGENCY DEPT VISIT MOD MDM: CPT | Mod: 25

## 2020-06-08 PROCEDURE — 99284 EMERGENCY DEPT VISIT MOD MDM: CPT | Mod: CS,GC

## 2020-06-08 RX ORDER — MIDAZOLAM HYDROCHLORIDE 1 MG/ML
1 INJECTION, SOLUTION INTRAMUSCULAR; INTRAVENOUS ONCE
Refills: 0 | Status: DISCONTINUED | OUTPATIENT
Start: 2020-06-08 | End: 2020-06-08

## 2020-06-08 RX ORDER — CEPHALEXIN 500 MG
10 CAPSULE ORAL
Qty: 200 | Refills: 0
Start: 2020-06-08 | End: 2020-06-17

## 2020-06-08 RX ORDER — CEFTRIAXONE 500 MG/1
1000 INJECTION, POWDER, FOR SOLUTION INTRAMUSCULAR; INTRAVENOUS ONCE
Refills: 0 | Status: COMPLETED | OUTPATIENT
Start: 2020-06-08 | End: 2020-06-08

## 2020-06-08 RX ORDER — MIDAZOLAM HYDROCHLORIDE 1 MG/ML
5 INJECTION, SOLUTION INTRAMUSCULAR; INTRAVENOUS ONCE
Refills: 0 | Status: DISCONTINUED | OUTPATIENT
Start: 2020-06-08 | End: 2020-06-08

## 2020-06-08 RX ORDER — MIDAZOLAM HYDROCHLORIDE 1 MG/ML
2.5 INJECTION, SOLUTION INTRAMUSCULAR; INTRAVENOUS ONCE
Refills: 0 | Status: DISCONTINUED | OUTPATIENT
Start: 2020-06-08 | End: 2020-06-08

## 2020-06-08 RX ORDER — MIDAZOLAM HYDROCHLORIDE 1 MG/ML
2 INJECTION, SOLUTION INTRAMUSCULAR; INTRAVENOUS ONCE
Refills: 0 | Status: DISCONTINUED | OUTPATIENT
Start: 2020-06-08 | End: 2020-06-08

## 2020-06-08 RX ORDER — SODIUM CHLORIDE 9 MG/ML
1000 INJECTION INTRAMUSCULAR; INTRAVENOUS; SUBCUTANEOUS ONCE
Refills: 0 | Status: COMPLETED | OUTPATIENT
Start: 2020-06-08 | End: 2020-06-08

## 2020-06-08 RX ADMIN — SODIUM CHLORIDE 1000 MILLILITER(S): 9 INJECTION INTRAMUSCULAR; INTRAVENOUS; SUBCUTANEOUS at 15:56

## 2020-06-08 RX ADMIN — MIDAZOLAM HYDROCHLORIDE 1 MILLIGRAM(S): 1 INJECTION, SOLUTION INTRAMUSCULAR; INTRAVENOUS at 18:32

## 2020-06-08 RX ADMIN — MIDAZOLAM HYDROCHLORIDE 2.5 MILLIGRAM(S): 1 INJECTION, SOLUTION INTRAMUSCULAR; INTRAVENOUS at 15:56

## 2020-06-08 RX ADMIN — CEFTRIAXONE 100 MILLIGRAM(S): 500 INJECTION, POWDER, FOR SOLUTION INTRAMUSCULAR; INTRAVENOUS at 17:52

## 2020-06-08 NOTE — ED PROVIDER NOTE - PHYSICAL EXAMINATION
Gen: NAD  Head: NCAT  HEENT: PERRL, MMM, normal conjunctiva, anicteric, neck supple  Lung: CTAB, no adventitious sounds  CV: RRR, no murmurs  Abd: soft, mildly distended, ttp throughout abd, no rebound or guarding, no CVAT  MSK: No edema, no visible deformities  Neuro: Moving all extremity grossly  Skin: Warm and dry, no evidence of rash

## 2020-06-08 NOTE — ED ADULT NURSE REASSESSMENT NOTE - NS ED NURSE REASSESS COMMENT FT1
Report received from Maura Ness. Patient nonverbal, nonverbal indicators of pain absent. Patient's IV is patent with fluids running, no redness, swelling or drainage at site. Patient clean and dry, breathing spontaneous and unlabored, skin color normal for ethnicity. Bed locked and in lowest position with side rails up for safety. Patient awaiting CT results, and additional labs once fluids are completed.

## 2020-06-08 NOTE — ED PROVIDER NOTE - PROGRESS NOTE DETAILS
Lee Gonzalez MD, PGY2: Patient received at resident sign out. Called [facility], spoke with RN, Ms. Sharma, verified that they can take care of pt's UTI. COVID result, negative, ware reported. PGY2/MD Carlos. rpt BMP with potassium 5.0, moderate hemolyze, no hyper potassium. CTRX was given in the ED, keflex is in her pharmacy. UTI. I have given the pt strict return and follow up precautions. The patient has been provided with a copy of all pertinent results. The patient has been informed of all concerning signs and symptoms to return to Emergency Department, the necessity to follow up with PMD/Clinic/follow up provided within 2-3 days was explained, and the patient reports understanding of above with capacity and insight.

## 2020-06-08 NOTE — ED PROVIDER NOTE - OBJECTIVE STATEMENT
54yo F hx CP, MR coming from group home for "cough, foul smelling urine, and diarrhea" since this AM. Pt nonverbal at baseline. 54yo F hx CP, MR coming from group home for "cough, foul smelling urine, and diarrhea" since this AM. Pt nonverbal at baseline.    Lives at "[facility]. [phone] [place] [address]. [phone]"

## 2020-06-08 NOTE — ED ADULT NURSE REASSESSMENT NOTE - NS ED NURSE REASSESS COMMENT FT1
RN gave versed as ordered by MD Mims. patient agitated when getting VS. to attemp VS, blood work and COVID swab after patient calms as per MD Mims

## 2020-06-08 NOTE — ED ADULT NURSE NOTE - NSFALLRSKASSISTTYPE_ED_ALL_ED
This patient comes into the office at 37 weeks for routine follow-up. Her fundal heights are less than her dates. She's been having weekly nonstress tests. She's been doing fetal kick counts at home which have shown the baby to be reactive.    GBS culture today. Good fetal movement. Vertex presentation. Growth ultrasound and follow-up in 1 week.  
Standing/Walking/Toileting

## 2020-06-08 NOTE — ED PROVIDER NOTE - PATIENT PORTAL LINK FT
You can access the FollowMyHealth Patient Portal offered by [facility] by registering at the following website: [email or web address]. By joining [employer]’s FollowMyHealth portal, you will also be able to view your health information using other applications (apps) compatible with our system.

## 2020-06-08 NOTE — ED ADULT NURSE REASSESSMENT NOTE - NS ED NURSE REASSESS COMMENT FT1
patient is resting calmly in bed in no acute distress. no s/s pain indicated at this time. RN called CT for transport for test. VSS. medications infusing as ordered. to repeat VBG after fluids complete

## 2020-06-08 NOTE — ED PROVIDER NOTE - ATTENDING CONTRIBUTION TO CARE
55F, pmh CP, MR, biba from group home with cc foul smellign urine, diarrhea, cough per paperwork. Pt unable to provide any history. Group home contacted, who states sx started today, few episodes of watery diarrhea. Report pt otherwise at baseline.    PE: Non-verbal, Easily agitated, NCAT, MMM, Trachea midline, Normal conjunctiva, lungs CTAB, S1/S2 RRR, Normal perfusion, 2+ radial pulses bilat, Abdomen Soft, Pt appears uncomfortable upon palpation of abdomen, No rebound/guarding, No LE edema.    Pt very agitated when attempted to examine/obtain vitals initially. Giving Versed IM. Will monitor closely for signs of over-sedation. Check labs. CXR. COVID swab. Urine. Re-eval. - Aamir Doran MD

## 2020-06-08 NOTE — ED PROVIDER NOTE - NSFOLLOWUPINSTRUCTIONS_ED_ALL_ED_FT
- take Keflex 500 mg twice per day, x 10d. follow up with PCP.    Urinary Tract Infection    A urinary tract infection (UTI) is an infection of any part of the urinary tract, which includes the kidneys, ureters, bladder, and urethra. Risk factors include ignoring your need to urinate, wiping back to front if female, being an uncircumcised male, and having diabetes or a weak immune system. Symptoms include frequent urination, pain or burning with urination, foul smelling urine, cloudy urine, pain in the lower abdomen, blood in the urine, and fever. If you were prescribed an antibiotic medicine, take it as told by your health care provider. Do not stop taking the antibiotic even if you start to feel better.    SEEK IMMEDIATE MEDICAL CARE IF YOU HAVE ANY OF THE FOLLOWING SYMPTOMS: severe back or abdominal pain, fever, inability to keep fluids or medicine down, dizziness/lightheadedness, or a change in mental status.

## 2020-06-08 NOTE — ED ADULT NURSE NOTE - TEMPLATE
Abdominal Pain, N/V/D Unna Boot Text: An Unna boot was placed to help immobilize the limb and facilitate more rapid healing.

## 2020-06-08 NOTE — ED ADULT NURSE NOTE - OBJECTIVE STATEMENT
54 yo female, non-vernal at baseline, presents from a group home c/o diarrhea x2days. patient is awake, agitated when being touched by RN, non-verbal. lung sounds assessed by MD. cap refill <3sec. +2 radial pulses noted. cap refill <3sec. abdomen is soft, non-distended. diaper is saturated with green/brown liquid stool. skin intact. patient refusing VS. no s/s pain indicated. patient cleaned with RN and tech at the bedside.

## 2020-06-09 VITALS
RESPIRATION RATE: 18 BRPM | DIASTOLIC BLOOD PRESSURE: 86 MMHG | HEART RATE: 86 BPM | SYSTOLIC BLOOD PRESSURE: 141 MMHG | OXYGEN SATURATION: 100 %

## 2020-06-09 LAB
CULTURE RESULTS: NO GROWTH — SIGNIFICANT CHANGE UP
SPECIMEN SOURCE: SIGNIFICANT CHANGE UP

## 2020-06-26 ENCOUNTER — APPOINTMENT (OUTPATIENT)
Dept: PULMONOLOGY | Facility: CLINIC | Age: 55
End: 2020-06-26

## 2020-07-04 NOTE — ED PROVIDER NOTE - NS ED MD TWO NIGHTS YN
Problem: Adult Behavioral Health Plan of Care  Goal: Patient-Specific Goal (Individualization)  Description: Patient will sleep 6-8 hours per night  Patient will eat at 75% of meals daily  Patient will attend >50% of group programming daily  Patient will be compliant with medication  Patient will have appropriate boundaries staff and peers during hospital  ELOPEMENT PRECAUTIONS; Eloped from [facility] 6/17/2020 7/4/2020 0017 by Kiki Castle, RN  Outcome: No Change  Note:      Face to face shift report received from Xi CHRISTOPHER. Rounding completed, pt observed.     Pt appeared to be sleeping most of this shift, normal respirations and position changes noted. Pt did not have any elopement attempts.     Face to face report will be communicated to oncoming RN.    Kiki Castle RN  7/4/2020  6:11 AM   Yes

## 2020-07-10 ENCOUNTER — APPOINTMENT (OUTPATIENT)
Dept: PULMONOLOGY | Facility: CLINIC | Age: 55
End: 2020-07-10

## 2020-07-10 ENCOUNTER — APPOINTMENT (OUTPATIENT)
Dept: PULMONOLOGY | Facility: CLINIC | Age: 55
End: 2020-07-10
Payer: MEDICARE

## 2020-07-10 DIAGNOSIS — R91.1 SOLITARY PULMONARY NODULE: ICD-10-CM

## 2020-07-10 PROCEDURE — 99442: CPT | Mod: 95

## 2020-07-21 NOTE — ED ADULT NURSE NOTE - DOES PATIENT HAVE ADVANCE DIRECTIVE
BACK PAIN FOLLOW-UP    Cullen is a 69 year old male who is a patient of Mario Syed MD, is here for follow-up on back pain which has been present for 1 week .  The patient reports that the pain was improved from last visit. Exacerbated last week with lifting a heavy object.  Pain radiates from the upper thoracic area to the neck.   It does not radiate into the arms or legs. It is not  associated with weakness or paresthesias in the arms or legs.  There are not bladder and bowel changes noted.  Pt is not heating and stretching.     Allergies:   ALLERGIES:   Allergen Reactions   • Iodides Other (See Comments)   • Iodinated Diagnostic Agents HIVES   • Amlodipine Other (See Comments)     Agitation and decreased sleep    • Codeine Other (See Comments)     elev BP   • Lisinopril Other (See Comments)     Fatigue   • Viagra Other (See Comments)     Current Outpatient Medications   Medication Sig Dispense Refill   • fluticasone (FLONASE) 50 MCG/ACT nasal spray Spray 2 sprays in each nostril daily. 16 g 3   • metoPROLOL succinate (TOPROL-XL) 50 MG 24 hr tablet Take 1 tablet by mouth daily. 90 tablet 1   • pantoprazole (PROTONIX) 40 MG tablet Take 1 tablet by mouth daily. 90 tablet 3   • prednisoLONE acetate (PRED FORTE, OMNIPRED) 1 % ophthalmic suspension Place 1 drop into right eye 4 times daily. 5 mL 0   • losartan (COZAAR) 50 MG tablet Take 1 tablet by mouth daily. 90 tablet 3   • albuterol 108 (90 Base) MCG/ACT inhaler Inhale 1-2 Puffs by mouth every 4 (four) hours as needed for Wheezing. - Inhalation     • alprostadil (MUSE) 1000 MCG pellet Place 1 Each into the urethra as needed for Erectile Dysfunction. use no more than 3 times per week - Urethral       No current facility-administered medications for this visit.       PE:  General: Alert and comfortable.  Cervical Spine:  No tenderness.  Spasm is not present.  Tissue texture changes are present.  There is no segmental motion restrictions noted.    Thoracic  Spine/Ribs:  Tenderness in the high bilateral R>L areas.  Spasm is not present.  Tissue texture changes are present.  T3-4RrSBr,  Gait: Normal  ASSESS AND PLAN:  Dx:    Encounter Diagnoses   Name Primary?   • Somatic dysfunction of thoracic region Yes   PLAN:  -OMT (see OMT note).  I , again,  feel pt would have some benefit from OMT. I again, described proceess and proceedure to Cullen at length who voiced understanding and agreed to proceed.  -Cont heating and stretching as previously percribed. Urged complaince.  -check thoracic xrays.  Will await Radiologist interpretation of the xrays.  Pt informed that will likely take 24-48 hours for me to get report and to call office if we have not contacted pt by 72 hours.  -consider adding PT.  -Pt education.  Pt voices understanding with plan.  All questions were answered.  Procedure - Osteopathic Manipulation  Heat applied to the area for about 15 minutes prior to OMT.  Manipulation was performed to  the thoracic spine levels 2, 3, 4, 5 on the  Bilateral and rib cage using HVLA, myofascial release and soft tissue release.  Post treatment the patient reports that the pain is improved.  As usual, Cullen knows to call or contact this office urgently if he would develop any bowel or bladder control issues, weakness, paralysis, inability to walk, coordination changes, or any other neurologic changes.  Instructed to call or return to office if the problem worsens, if new signs or symptoms develop, or does not improve.  Follow-up PRN  for recheck and possible OMT.  Electronically signed by: Scott Mcbride DO 7/21/2020     This note was dictated in its entirety or partially using speech recognition software. Minor errors in transcription may be present.           No

## 2020-08-10 NOTE — ED PROVIDER NOTE - OTHER LANGUAGE
Health Maintenance Due   Topic Date Due   • Cervical Cancer Screening HPV CO-Testing  03/30/1987   • Colorectal Cancer Screening-Colonoscopy  03/30/2007   • Shingles Vaccine (1 of 2) 03/30/2007   • Hepatitis C Screening  03/30/2008   • DTaP/Tdap/Td Vaccine (2 - Td) 10/27/2018   • Breast Cancer Screening  05/14/2020       Patient is due for topics as listed above but is not proceeding with Colorectal Cancer Screening: Colonoscopy at this time. Orders placed for Cervical cancer screening and Mammogram.           non verbal

## 2020-08-21 ENCOUNTER — EMERGENCY (EMERGENCY)
Facility: HOSPITAL | Age: 55
LOS: 1 days | Discharge: HOME | End: 2020-08-21
Attending: EMERGENCY MEDICINE | Admitting: EMERGENCY MEDICINE
Payer: MEDICARE

## 2020-08-21 VITALS
DIASTOLIC BLOOD PRESSURE: 70 MMHG | SYSTOLIC BLOOD PRESSURE: 120 MMHG | OXYGEN SATURATION: 97 % | RESPIRATION RATE: 18 BRPM | TEMPERATURE: 98 F | HEART RATE: 85 BPM

## 2020-08-21 VITALS
OXYGEN SATURATION: 97 % | HEART RATE: 74 BPM | TEMPERATURE: 98 F | SYSTOLIC BLOOD PRESSURE: 156 MMHG | DIASTOLIC BLOOD PRESSURE: 96 MMHG | RESPIRATION RATE: 18 BRPM

## 2020-08-21 DIAGNOSIS — Z93.1 GASTROSTOMY STATUS: Chronic | ICD-10-CM

## 2020-08-21 PROCEDURE — 43762 RPLC GTUBE NO REVJ TRC: CPT

## 2020-08-21 PROCEDURE — 74018 RADEX ABDOMEN 1 VIEW: CPT | Mod: 26,76

## 2020-08-21 PROCEDURE — 99283 EMERGENCY DEPT VISIT LOW MDM: CPT | Mod: 25

## 2020-08-21 NOTE — ED ADULT NURSE REASSESSMENT NOTE - NS ED NURSE REASSESS COMMENT FT1
Pt discharged. [facility] for transport back to [facility] with staff member, transport set up by [facility].

## 2020-08-21 NOTE — ED POST DISCHARGE NOTE - RESULT SUMMARY
Call rec'd from Lou Novak RN, at [facility]; states pt returned to facility without discharge paperwork/test results; requests papers be faxed to facility.  Fax completed with confirmation page rec'd.

## 2020-08-21 NOTE — PROVIDER CONTACT NOTE (OTHER) - ASSESSMENT
Arranged [facility] [phone]. Pt is with staff, returning back to Residence. P/U 7 PM. Trip# [id number].

## 2020-08-21 NOTE — ED PROCEDURE NOTE - PROCEDURE DATE TIME, MLM
Currently on chemotherapy at  with recent bone marrow biopsy showing relapse of multiple myeloma as well as the AML patient is on chemo.   21-Aug-2020 15:07

## 2020-08-21 NOTE — ED PROVIDER NOTE - PHYSICAL EXAMINATION
PHYSICAL EXAM  GENERAL: NAD, lying comfortably in bed   HEAD:  Atraumatic, Normocephalic  CHEST/LUNG: Clear to auscultation bilaterally   HEART: Regular rate and rhythm; S1 and S2 present   ABDOMEN: Nondistended. Soft, Nontender to palpation. Bowel sounds present. G-tube in place, secured with tape and gauze. Trace blood noted at site of g-tube. No purulence, serosanguinous drainage noted.  EXTREMITIES:  2+ Peripheral Pulses, No clubbing, cyanosis, or edema  NEURO: AAOx3, non-focal   SKIN: No rashes or lesions

## 2020-08-21 NOTE — ED ADULT NURSE NOTE - CHIEF COMPLAINT QUOTE
Pt brought in by ambulance from [facility] for replacement of g-tube. As per report, facility states that the inner bubble has ruptured. G-tube still in place. No obvious distention noted, non-tender to palpation.

## 2020-08-21 NOTE — ED ADULT NURSE NOTE - OBJECTIVE STATEMENT
Brought to intake 9 for g-tube replacement. Staff from residence here with patient. G-tube replaced by providers, awaiting radiology.

## 2020-08-21 NOTE — ED ADULT TRIAGE NOTE - CHIEF COMPLAINT QUOTE
Pt brought in by ambulance from [facility] for replacement of g-tube. As per report, facility states that the inner bubble has ruptured. G-tube still in place. No obvious distention noted, non-tender to palpation.

## 2020-08-21 NOTE — ED PROVIDER NOTE - ATTENDING CONTRIBUTION TO CARE
Elsa: I have reviewed and discussed the medical student’s documentation and findings with the student. After personally examining the patient, my findings have been added to this documentation.

## 2020-08-21 NOTE — ED PROVIDER NOTE - PATIENT PORTAL LINK FT
You can access the FollowMyHealth Patient Portal offered by [facility] by registering at the following website: [email or web address]. By joining [employer]’s FollowMyHealth portal, you will also be able to view your health information using other applications (apps) compatible with our system.

## 2020-08-21 NOTE — ED PROVIDER NOTE - OBJECTIVE STATEMENT
55 yr old F with hx of bipolar, seizure disorder, profound intellectual disability, cerebral palsy, dysphagia, and chronic constipation who presents from [facility] after removing her g-tube herself at noon today. Staff replaced as best they could and secured with tape and gauze. Pt is non-verbal at baseline, so history obtained from Julia caban, at bedside, patient records, and contact at [facility]. Pt has been at home since May 2020 and had g-tube in place since that time. Pt was given all of her medications this morning prior to removing g-tube, including her anti-seizure medications. No fevers, chills, SOB, chest pain, urinary symptoms, no N/V/D.

## 2020-08-21 NOTE — ED PROVIDER NOTE - NS ED ROS FT
Review of Systems: Obtained from group home since patient is non-verbal at baseline   Constitutional: No fever, No weight loss, good appetite/po intake  Head: No headache   Eyes: No blurry vision, No diplopia  Neuro: No tremors, No muscle weakness   Cardiovascular: No chest pain, No palpitations  Respiratory: No increased respiratory effort, No SOB, No cough  GI: No nausea, No vomiting, No diarrhea.   : No dysuria, No hematuria  Skin: No rash. No erythema, edema, discharge, purulence noted at G-tube site. Prior G-tube is still in place.   MSK: No joint pain   All other systems negative except as per HPI

## 2020-08-28 ENCOUNTER — EMERGENCY (EMERGENCY)
Facility: HOSPITAL | Age: 55
LOS: 1 days | Discharge: HOME | End: 2020-08-28
Attending: EMERGENCY MEDICINE | Admitting: EMERGENCY MEDICINE
Payer: MEDICARE

## 2020-08-28 VITALS
HEART RATE: 81 BPM | OXYGEN SATURATION: 96 % | DIASTOLIC BLOOD PRESSURE: 87 MMHG | TEMPERATURE: 97 F | SYSTOLIC BLOOD PRESSURE: 133 MMHG | RESPIRATION RATE: 16 BRPM

## 2020-08-28 DIAGNOSIS — Z93.1 GASTROSTOMY STATUS: Chronic | ICD-10-CM

## 2020-08-28 LAB
ALBUMIN SERPL ELPH-MCNC: 4.3 G/DL — SIGNIFICANT CHANGE UP (ref 3.3–5)
ALP SERPL-CCNC: 88 U/L — SIGNIFICANT CHANGE UP (ref 40–120)
ALT FLD-CCNC: 16 U/L — SIGNIFICANT CHANGE UP (ref 4–33)
ANION GAP SERPL CALC-SCNC: 12 MMO/L — SIGNIFICANT CHANGE UP (ref 7–14)
AST SERPL-CCNC: 17 U/L — SIGNIFICANT CHANGE UP (ref 4–32)
BASOPHILS # BLD AUTO: 0.03 K/UL — SIGNIFICANT CHANGE UP (ref 0–0.2)
BASOPHILS NFR BLD AUTO: 0.4 % — SIGNIFICANT CHANGE UP (ref 0–2)
BILIRUB SERPL-MCNC: 0.3 MG/DL — SIGNIFICANT CHANGE UP (ref 0.2–1.2)
BUN SERPL-MCNC: 13 MG/DL — SIGNIFICANT CHANGE UP (ref 7–23)
CALCIUM SERPL-MCNC: 9.5 MG/DL — SIGNIFICANT CHANGE UP (ref 8.4–10.5)
CHLORIDE SERPL-SCNC: 92 MMOL/L — LOW (ref 98–107)
CO2 SERPL-SCNC: 28 MMOL/L — SIGNIFICANT CHANGE UP (ref 22–31)
CREAT SERPL-MCNC: 0.49 MG/DL — LOW (ref 0.5–1.3)
EOSINOPHIL # BLD AUTO: 0.32 K/UL — SIGNIFICANT CHANGE UP (ref 0–0.5)
EOSINOPHIL NFR BLD AUTO: 3.8 % — SIGNIFICANT CHANGE UP (ref 0–6)
GLUCOSE SERPL-MCNC: 95 MG/DL — SIGNIFICANT CHANGE UP (ref 70–99)
HCT VFR BLD CALC: 41.4 % — SIGNIFICANT CHANGE UP (ref 34.5–45)
HGB BLD-MCNC: 13.8 G/DL — SIGNIFICANT CHANGE UP (ref 11.5–15.5)
IMM GRANULOCYTES NFR BLD AUTO: 0.4 % — SIGNIFICANT CHANGE UP (ref 0–1.5)
LYMPHOCYTES # BLD AUTO: 2.31 K/UL — SIGNIFICANT CHANGE UP (ref 1–3.3)
LYMPHOCYTES # BLD AUTO: 27.1 % — SIGNIFICANT CHANGE UP (ref 13–44)
MAGNESIUM SERPL-MCNC: 2 MG/DL — SIGNIFICANT CHANGE UP (ref 1.6–2.6)
MCHC RBC-ENTMCNC: 32.5 PG — SIGNIFICANT CHANGE UP (ref 27–34)
MCHC RBC-ENTMCNC: 33.3 % — SIGNIFICANT CHANGE UP (ref 32–36)
MCV RBC AUTO: 97.4 FL — SIGNIFICANT CHANGE UP (ref 80–100)
MONOCYTES # BLD AUTO: 1 K/UL — HIGH (ref 0–0.9)
MONOCYTES NFR BLD AUTO: 11.7 % — SIGNIFICANT CHANGE UP (ref 2–14)
NEUTROPHILS # BLD AUTO: 4.84 K/UL — SIGNIFICANT CHANGE UP (ref 1.8–7.4)
NEUTROPHILS NFR BLD AUTO: 56.6 % — SIGNIFICANT CHANGE UP (ref 43–77)
NRBC # FLD: 0 K/UL — SIGNIFICANT CHANGE UP (ref 0–0)
PHOSPHATE SERPL-MCNC: 3.8 MG/DL — SIGNIFICANT CHANGE UP (ref 2.5–4.5)
PLATELET # BLD AUTO: 282 K/UL — SIGNIFICANT CHANGE UP (ref 150–400)
PMV BLD: 10 FL — SIGNIFICANT CHANGE UP (ref 7–13)
POTASSIUM SERPL-MCNC: 5.2 MMOL/L — SIGNIFICANT CHANGE UP (ref 3.5–5.3)
POTASSIUM SERPL-SCNC: 5.2 MMOL/L — SIGNIFICANT CHANGE UP (ref 3.5–5.3)
PROT SERPL-MCNC: 8.1 G/DL — SIGNIFICANT CHANGE UP (ref 6–8.3)
RBC # BLD: 4.25 M/UL — SIGNIFICANT CHANGE UP (ref 3.8–5.2)
RBC # FLD: 12.9 % — SIGNIFICANT CHANGE UP (ref 10.3–14.5)
SODIUM SERPL-SCNC: 132 MMOL/L — LOW (ref 135–145)
VALPROATE SERPL-MCNC: 42.6 UG/ML — LOW (ref 50–100)
WBC # BLD: 8.53 K/UL — SIGNIFICANT CHANGE UP (ref 3.8–10.5)
WBC # FLD AUTO: 8.53 K/UL — SIGNIFICANT CHANGE UP (ref 3.8–10.5)

## 2020-08-28 PROCEDURE — 99285 EMERGENCY DEPT VISIT HI MDM: CPT

## 2020-08-28 RX ORDER — VALPROIC ACID (AS SODIUM SALT) 250 MG/5ML
750 SOLUTION, ORAL ORAL ONCE
Refills: 0 | Status: DISCONTINUED | OUTPATIENT
Start: 2020-08-28 | End: 2020-08-28

## 2020-08-28 RX ORDER — VALPROIC ACID (AS SODIUM SALT) 250 MG/5ML
750 SOLUTION, ORAL ORAL ONCE
Refills: 0 | Status: COMPLETED | OUTPATIENT
Start: 2020-08-28 | End: 2020-08-28

## 2020-08-28 NOTE — PROVIDER CONTACT NOTE (OTHER) - ASSESSMENT
Pt is with staff from residence and will be returning via ambulance. SW arranged ambulance via [facility] ([phone]) and spoke to Garrett. Trip ID [id number], pickup 1AM. Verbal huddle completed. Pt is with staff from residence and will be returning via ambulance. SW spoke to RN ([phone]) to confirm address. SW arranged ambulance via [facility] ([phone]) and spoke to Garrett. Trip ID [id number], pickup 1AM. Verbal huddle completed.

## 2020-08-28 NOTE — ED PROVIDER NOTE - OBJECTIVE STATEMENT
55F with hx of bipolar, seizure disorder, profound intellectual disability, cerebral palsy, dysphagia, and chronic constipation who presenting from group home today after a witnessed 5 minute seizure. Patient has returned to baseline. Per aide, patient has been taking her seizure meds.

## 2020-08-28 NOTE — ED PROVIDER NOTE - NSFOLLOWUPINSTRUCTIONS_ED_ALL_ED_FT
Your diagnosis: seizures    Discharge instructions:    - Please follow up with your neurologist.    - Take any prescribed medications as instructed: continue taking your seizure medications as prescribed.    - Be sure to return to the ED if you develop new or worsening symptoms. Specific signs and symptoms to be vigilant of: persistent seizures.

## 2020-08-28 NOTE — ED ADULT TRIAGE NOTE - CHIEF COMPLAINT QUOTE
Pt was witnessed seizure by group home staff for "approx 5 mins". As per staff with pt, pt is back to baseline mental status which is non verbal. H/o seizures, gtube, MR, CP, HLD.

## 2020-08-28 NOTE — ED PROVIDER NOTE - CLINICAL SUMMARY MEDICAL DECISION MAKING FREE TEXT BOX
55F with hx of bipolar, seizure disorder, profound intellectual disability, cerebral palsy, dysphagia, and chronic constipation who presenting from group home today after a witnessed 5 minute seizure. On PE, VS wnl, neuro exam non-focal. Will check labs, including drug levels, likely TBDC home.

## 2020-08-28 NOTE — ED PROVIDER NOTE - PATIENT PORTAL LINK FT
You can access the FollowMyHealth Patient Portal offered by [facility] by registering at the following website: [email or web address]. By joining [employer]’s FollowMyHealth portal, you will also be able to view your health information using other applications (apps) compatible with our system.

## 2020-08-28 NOTE — ED PROVIDER NOTE - PHYSICAL EXAMINATION
GENERAL: non-toxic appearing, in NAD  HEAD: atraumatic, normocephalic  EYES: no conjunctivitis  NOSE: no nasal epistaxis   CARDIAC: RRR, normal S1S2,  no appreciable murmurs, no cyanosis, cap refill < 2 seconds  PULM: no respiratory distress, oxygen saturation on RA wnl, CTAB, no crackles, rales, rhonchi, or wheezing  GI: abdomen nondistended, soft, nontender, no guarding or rebound tenderness, no palpable masses  NEURO: awake and alert, moving 4 extremities spontaneously, interactive, pupils 2 mm bilaterally, equal and reactive to light  MSK: spine appears normal, no joint swelling or erythema, no gross deformities of extremities  EXT: no peripheral edema, calf tenderness, redness or swelling  SKIN: warm, dry, and intact, no rashes  PSYCH: appropriate mood and affect

## 2020-08-28 NOTE — PROVIDER CONTACT NOTE (OTHER) - BACKGROUND
Per MD Robin, pt is cleared and is able to return to previous residence, [facility] ([phone] [place] [address]). Per MD Robin, pt is cleared and is able to return to previous residence, [facility] ([address] [place], [place]).

## 2020-08-28 NOTE — ED ADULT NURSE NOTE - OBJECTIVE STATEMENT
Pt received, nonverbal but alert. Per aide, pt had seizures that lasted for 5mins. No urinary or bowel incontinence reported. No trauma noted on exam. VSS. NSR on cardiac monitor. MD at bedside. Will monitor.

## 2020-08-29 RX ADMIN — Medication 750 MILLIGRAM(S): at 01:11

## 2020-08-31 LAB — LEVETIRACETAM SERPL-MCNC: <2 MCG/ML — LOW (ref 12–46)

## 2020-09-01 NOTE — ED POST DISCHARGE NOTE - REASON FOR FOLLOW-UP
Other Keppra level <2.0 and valporic acid level 42.6 both levels low. Patient contact # [phone] [facility] spoke with Ashley who states patient no longer living at resident alt # [phone] : patient no longer at this resident. Alt # [phone] no answer. Patient told to follow up with neurologist.

## 2020-10-14 NOTE — SWALLOW BEDSIDE ASSESSMENT ADULT - PHARYNGEAL PHASE
Artificial Tears:  Systane, Refresh, Thera Tears or Genteal - 1 drop 3-4x daily and as needed    Never use products that say \"get the red out.\" NO Visine.     
Delayed pharyngeal swallow/Decreased laryngeal elevation/Wet vocal quality post oral intake/Throat clear post oral intake
increased upper airway breath sounds/Delayed pharyngeal swallow/Cough post oral intake
KEEGAN

## 2020-10-30 ENCOUNTER — EMERGENCY (EMERGENCY)
Facility: HOSPITAL | Age: 55
LOS: 1 days | Discharge: HOME | End: 2020-10-30
Attending: EMERGENCY MEDICINE | Admitting: EMERGENCY MEDICINE
Payer: MEDICARE

## 2020-10-30 VITALS
HEIGHT: 60 IN | DIASTOLIC BLOOD PRESSURE: 66 MMHG | RESPIRATION RATE: 16 BRPM | SYSTOLIC BLOOD PRESSURE: 125 MMHG | HEART RATE: 114 BPM

## 2020-10-30 VITALS
DIASTOLIC BLOOD PRESSURE: 87 MMHG | OXYGEN SATURATION: 99 % | RESPIRATION RATE: 18 BRPM | HEART RATE: 98 BPM | SYSTOLIC BLOOD PRESSURE: 150 MMHG

## 2020-10-30 DIAGNOSIS — Z93.1 GASTROSTOMY STATUS: Chronic | ICD-10-CM

## 2020-10-30 LAB
ALBUMIN SERPL ELPH-MCNC: 3.9 G/DL — SIGNIFICANT CHANGE UP (ref 3.3–5)
ALP SERPL-CCNC: 75 U/L — SIGNIFICANT CHANGE UP (ref 40–120)
ALT FLD-CCNC: 17 U/L — SIGNIFICANT CHANGE UP (ref 4–33)
ANION GAP SERPL CALC-SCNC: 11 MMO/L — SIGNIFICANT CHANGE UP (ref 7–14)
APPEARANCE UR: CLEAR — SIGNIFICANT CHANGE UP
AST SERPL-CCNC: 28 U/L — SIGNIFICANT CHANGE UP (ref 4–32)
B PERT DNA SPEC QL NAA+PROBE: SIGNIFICANT CHANGE UP
BASE EXCESS BLDV CALC-SCNC: 6.8 MMOL/L — SIGNIFICANT CHANGE UP
BASOPHILS # BLD AUTO: 0.03 K/UL — SIGNIFICANT CHANGE UP (ref 0–0.2)
BASOPHILS NFR BLD AUTO: 0.3 % — SIGNIFICANT CHANGE UP (ref 0–2)
BILIRUB SERPL-MCNC: 0.2 MG/DL — SIGNIFICANT CHANGE UP (ref 0.2–1.2)
BILIRUB UR-MCNC: NEGATIVE — SIGNIFICANT CHANGE UP
BLOOD GAS VENOUS - CREATININE: 0.49 MG/DL — LOW (ref 0.5–1.3)
BLOOD GAS VENOUS - FIO2: 21 — SIGNIFICANT CHANGE UP
BLOOD UR QL VISUAL: NEGATIVE — SIGNIFICANT CHANGE UP
BUN SERPL-MCNC: 13 MG/DL — SIGNIFICANT CHANGE UP (ref 7–23)
C PNEUM DNA SPEC QL NAA+PROBE: SIGNIFICANT CHANGE UP
CALCIUM SERPL-MCNC: 9.1 MG/DL — SIGNIFICANT CHANGE UP (ref 8.4–10.5)
CHLORIDE BLDV-SCNC: 89 MMOL/L — LOW (ref 96–108)
CHLORIDE SERPL-SCNC: 88 MMOL/L — LOW (ref 98–107)
CO2 SERPL-SCNC: 30 MMOL/L — SIGNIFICANT CHANGE UP (ref 22–31)
COLOR SPEC: YELLOW — SIGNIFICANT CHANGE UP
CREAT SERPL-MCNC: 0.39 MG/DL — LOW (ref 0.5–1.3)
EOSINOPHIL # BLD AUTO: 0.08 K/UL — SIGNIFICANT CHANGE UP (ref 0–0.5)
EOSINOPHIL NFR BLD AUTO: 0.7 % — SIGNIFICANT CHANGE UP (ref 0–6)
FLUAV H1 2009 PAND RNA SPEC QL NAA+PROBE: SIGNIFICANT CHANGE UP
FLUAV H1 RNA SPEC QL NAA+PROBE: SIGNIFICANT CHANGE UP
FLUAV H3 RNA SPEC QL NAA+PROBE: SIGNIFICANT CHANGE UP
FLUAV SUBTYP SPEC NAA+PROBE: SIGNIFICANT CHANGE UP
FLUBV RNA SPEC QL NAA+PROBE: SIGNIFICANT CHANGE UP
GAS PNL BLDV: 130 MMOL/L — LOW (ref 136–146)
GLUCOSE BLDV-MCNC: 91 MG/DL — SIGNIFICANT CHANGE UP (ref 70–99)
GLUCOSE SERPL-MCNC: 88 MG/DL — SIGNIFICANT CHANGE UP (ref 70–99)
GLUCOSE UR-MCNC: NEGATIVE — SIGNIFICANT CHANGE UP
HADV DNA SPEC QL NAA+PROBE: SIGNIFICANT CHANGE UP
HCO3 BLDV-SCNC: 29 MMOL/L — HIGH (ref 20–27)
HCOV PNL SPEC NAA+PROBE: SIGNIFICANT CHANGE UP
HCT VFR BLD CALC: 37.7 % — SIGNIFICANT CHANGE UP (ref 34.5–45)
HCT VFR BLDV CALC: 40.5 % — SIGNIFICANT CHANGE UP (ref 34.5–45)
HGB BLD-MCNC: 12.2 G/DL — SIGNIFICANT CHANGE UP (ref 11.5–15.5)
HGB BLDV-MCNC: 13.2 G/DL — SIGNIFICANT CHANGE UP (ref 11.5–15.5)
HMPV RNA SPEC QL NAA+PROBE: SIGNIFICANT CHANGE UP
HPIV1 RNA SPEC QL NAA+PROBE: SIGNIFICANT CHANGE UP
HPIV2 RNA SPEC QL NAA+PROBE: SIGNIFICANT CHANGE UP
HPIV3 RNA SPEC QL NAA+PROBE: SIGNIFICANT CHANGE UP
HPIV4 RNA SPEC QL NAA+PROBE: SIGNIFICANT CHANGE UP
IMM GRANULOCYTES NFR BLD AUTO: 0.5 % — SIGNIFICANT CHANGE UP (ref 0–1.5)
KETONES UR-MCNC: NEGATIVE — SIGNIFICANT CHANGE UP
LACTATE BLDV-MCNC: 2.8 MMOL/L — HIGH (ref 0.5–2)
LEUKOCYTE ESTERASE UR-ACNC: NEGATIVE — SIGNIFICANT CHANGE UP
LIDOCAIN IGE QN: 19.8 U/L — SIGNIFICANT CHANGE UP (ref 7–60)
LYMPHOCYTES # BLD AUTO: 1.57 K/UL — SIGNIFICANT CHANGE UP (ref 1–3.3)
LYMPHOCYTES # BLD AUTO: 13.8 % — SIGNIFICANT CHANGE UP (ref 13–44)
MAGNESIUM SERPL-MCNC: 1.9 MG/DL — SIGNIFICANT CHANGE UP (ref 1.6–2.6)
MCHC RBC-ENTMCNC: 31 PG — SIGNIFICANT CHANGE UP (ref 27–34)
MCHC RBC-ENTMCNC: 32.4 % — SIGNIFICANT CHANGE UP (ref 32–36)
MCV RBC AUTO: 95.9 FL — SIGNIFICANT CHANGE UP (ref 80–100)
MONOCYTES # BLD AUTO: 1.19 K/UL — HIGH (ref 0–0.9)
MONOCYTES NFR BLD AUTO: 10.4 % — SIGNIFICANT CHANGE UP (ref 2–14)
NEUTROPHILS # BLD AUTO: 8.47 K/UL — HIGH (ref 1.8–7.4)
NEUTROPHILS NFR BLD AUTO: 74.3 % — SIGNIFICANT CHANGE UP (ref 43–77)
NITRITE UR-MCNC: NEGATIVE — SIGNIFICANT CHANGE UP
NRBC # FLD: 0 K/UL — SIGNIFICANT CHANGE UP (ref 0–0)
PCO2 BLDV: 55 MMHG — HIGH (ref 41–51)
PH BLDV: 7.38 PH — SIGNIFICANT CHANGE UP (ref 7.32–7.43)
PH UR: 7.5 — SIGNIFICANT CHANGE UP (ref 5–8)
PHOSPHATE SERPL-MCNC: 3.5 MG/DL — SIGNIFICANT CHANGE UP (ref 2.5–4.5)
PLATELET # BLD AUTO: 311 K/UL — SIGNIFICANT CHANGE UP (ref 150–400)
PMV BLD: 9.7 FL — SIGNIFICANT CHANGE UP (ref 7–13)
PO2 BLDV: 41 MMHG — HIGH (ref 35–40)
POTASSIUM BLDV-SCNC: 4.5 MMOL/L — SIGNIFICANT CHANGE UP (ref 3.4–4.5)
POTASSIUM SERPL-MCNC: 4.6 MMOL/L — SIGNIFICANT CHANGE UP (ref 3.5–5.3)
POTASSIUM SERPL-SCNC: 4.6 MMOL/L — SIGNIFICANT CHANGE UP (ref 3.5–5.3)
PROT SERPL-MCNC: 7.7 G/DL — SIGNIFICANT CHANGE UP (ref 6–8.3)
PROT UR-MCNC: NEGATIVE — SIGNIFICANT CHANGE UP
RAPID RVP RESULT: SIGNIFICANT CHANGE UP
RBC # BLD: 3.93 M/UL — SIGNIFICANT CHANGE UP (ref 3.8–5.2)
RBC # FLD: 12.1 % — SIGNIFICANT CHANGE UP (ref 10.3–14.5)
RSV RNA SPEC QL NAA+PROBE: SIGNIFICANT CHANGE UP
RV+EV RNA SPEC QL NAA+PROBE: SIGNIFICANT CHANGE UP
SAO2 % BLDV: 71.4 % — SIGNIFICANT CHANGE UP (ref 60–85)
SARS-COV-2 RNA SPEC QL NAA+PROBE: SIGNIFICANT CHANGE UP
SODIUM SERPL-SCNC: 129 MMOL/L — LOW (ref 135–145)
SP GR SPEC: 1.01 — SIGNIFICANT CHANGE UP (ref 1–1.04)
UROBILINOGEN FLD QL: NORMAL — SIGNIFICANT CHANGE UP
VALPROATE SERPL-MCNC: 42 UG/ML — LOW (ref 50–100)
WBC # BLD: 11.4 K/UL — HIGH (ref 3.8–10.5)
WBC # FLD AUTO: 11.4 K/UL — HIGH (ref 3.8–10.5)

## 2020-10-30 PROCEDURE — 99284 EMERGENCY DEPT VISIT MOD MDM: CPT | Mod: CS,GC

## 2020-10-30 PROCEDURE — 71045 X-RAY EXAM CHEST 1 VIEW: CPT | Mod: 26

## 2020-10-30 RX ORDER — MIDAZOLAM HYDROCHLORIDE 1 MG/ML
6 INJECTION, SOLUTION INTRAMUSCULAR; INTRAVENOUS ONCE
Refills: 0 | Status: DISCONTINUED | OUTPATIENT
Start: 2020-10-30 | End: 2020-10-30

## 2020-10-30 RX ORDER — SODIUM CHLORIDE 9 MG/ML
1000 INJECTION, SOLUTION INTRAVENOUS ONCE
Refills: 0 | Status: COMPLETED | OUTPATIENT
Start: 2020-10-30 | End: 2020-10-30

## 2020-10-30 RX ADMIN — SODIUM CHLORIDE 1000 MILLILITER(S): 9 INJECTION, SOLUTION INTRAVENOUS at 19:19

## 2020-10-30 RX ADMIN — MIDAZOLAM HYDROCHLORIDE 6 MILLIGRAM(S): 1 INJECTION, SOLUTION INTRAMUSCULAR; INTRAVENOUS at 18:10

## 2020-10-30 NOTE — PROVIDER CONTACT NOTE (OTHER) - ASSESSMENT
Pt is with staff from residence and will be returning via ambulance. SW arranged ambulance via [facility] ([phone]) Trip ID [id number], pickup 10:30PM . Verbal huddle completed.

## 2020-10-30 NOTE — ED ADULT NURSE NOTE - INTERVENTIONS DEFINITIONS
Non-slip footwear when patient is off stretcher/Physically safe environment: no spills, clutter or unnecessary equipment/Stretcher in lowest position, wheels locked, appropriate side rails in place/Monitor gait and stability/Provide visual clues: red socks

## 2020-10-30 NOTE — ED PROVIDER NOTE - PHYSICAL EXAMINATION
PHYSICAL EXAM:  GENERAL: NAD, well-developed  HEAD:  Atraumatic, Normocephalic  EYES: EOMI,  conjunctiva and sclera clear  NECK: Supple,   CHEST/LUNG: poor inspiratory effort   HEART: Regular rate and rhythm; No murmurs, rubs, or gallops  ABDOMEN: Soft, Nontender, Nondistended; Bowel sounds present; +J tube   EXTREMITIES:  2+ Peripheral Pulses, No clubbing, cyanosis, or edema  PSYCH: AAOx0;   NEUROLOGY: moves all 4 extremities spontaneously   SKIN: No rashes or lesions

## 2020-10-30 NOTE — ED PROVIDER NOTE - CLINICAL SUMMARY MEDICAL DECISION MAKING FREE TEXT BOX
55 year old female with seizure disorder (last seizure 1 week ago), intellectual disability, cerebral palsy, nonverbal at baseline, chronic constipation, s/p PEG tube, presenting with 2 episodes of NBNB vomiting, foul smelling urine, and cough.  Differential diagnosis includes UTI vs respiratory viral illness.  Obtain labs, CXR, UA, urine cx. 55 year old female with seizure disorder (last seizure 1 week ago), intellectual disability, cerebral palsy, nonverbal at baseline, chronic constipation, s/p PEG tube, presenting with 2 episodes of NBNB vomiting, foul smelling urine, and cough. Well appearing, in NAD.  Non verbal and unabel to provide hx. No focal findings on exam.  Differential diagnosis includes UTI vs respiratory viral illness.  Obtain labs, CXR, UA, urine cx.

## 2020-10-30 NOTE — ED ADULT NURSE REASSESSMENT NOTE - NS ED NURSE REASSESS COMMENT FT1
Report received from CARON Bishop. Pt baseline mental status with aide at bedside. Vital signs as noted. No N+V, diarrhea observed at this time. Pt resting in stretcher, appears comfortable. Awaiting pickup at this time. Will continue to monitor.

## 2020-10-30 NOTE — ED ADULT NURSE NOTE - CHIEF COMPLAINT QUOTE
Pt w/ hx of Leukemia, seizures feeding tube in place brought in from [facility] in [facility] [place] arrives with staff,  for vomiting appearing yellow and red, Pt sounds congested, Per EMS Pt is nonverbal at baseline.  Pt is alert and responds to her name, Pt does not cooperate with vital signs, unable to obtain temperature

## 2020-10-30 NOTE — ED PROVIDER NOTE - NSFOLLOWUPINSTRUCTIONS_ED_ALL_ED_FT
You were seen an evaluated in the emergency room for vomiting.    Please follow up with your primary care provider in the next few days.    Continue all other home medications as prescribed.    Seek care immediately if:   Your bowel movement has blood in it, or looks like black tar.   You cannot stop vomiting, or vomit blood.  Your abdomen is larger than usual, very painful, and hard.   You have severe pain in your abdomen.   You stop passing gas or having bowel movements.   You have heavy vaginal bleeding.  You have chest pain or shortness of breath.  You feel weak, dizzy, or faint.  Or any worse or abnormal symptoms.     Please read all attached.

## 2020-10-30 NOTE — PROVIDER CONTACT NOTE (OTHER) - BACKGROUND
Per provider, pt is cleared to return to previous residence, [facility] ([address] [place] [address]). Per MD Hodges, pt is cleared to return to previous residence, [facility] ([address] [place] [address]). Per MD Blanchard, pt is cleared to return to previous residence, [facility] ([address] [place] [address]).

## 2020-10-30 NOTE — ED PROVIDER NOTE - PATIENT PORTAL LINK FT
You can access the FollowMyHealth Patient Portal offered by [facility] by registering at the following website: [email or web address]. By joining [facility]’s FollowMyHealth portal, you will also be able to view your health information using other applications (apps) compatible with our system.

## 2020-10-30 NOTE — ED ADULT NURSE NOTE - OBJECTIVE STATEMENT
pt received in rm 11. pt nonverbal at baseline however is alert. pt combative and non compliant with physical assessment. as per triage note pt sent in from NH for episode of vomiting, yellow and red appearing. pt unable to complete assessment questions at this time. peg tube noted to abdomen, clean and intact. blanchable redness noted to sacrum. will continue to monitor.

## 2020-10-30 NOTE — ED PROVIDER NOTE - ATTENDING CONTRIBUTION TO CARE
Attending Attestation: Dr. Blanchard  I have personally performed a history and physical examination of the patient and discussed management with the resident as well as the patient.  I reviewed the resident's note and agree with the documented findings and plan of care.  I have authored and modified critical sections of the Provider Note, including but not limited to HPI, Physical Exam and MDM. 55 year old female with seizure disorder (last seizure 1 week ago), intellectual disability, cerebral palsy, nonverbal at baseline, chronic constipation, s/p PEG tube, presenting with 2 episodes of NBNB vomiting, foul smelling urine, and cough. Well appearing, in NAD.  Non verbal and unabel to provide hx. No focal findings on exam.  Differential diagnosis includes UTI vs respiratory viral illness.  Obtain labs, CXR, UA, urine cx.

## 2020-10-30 NOTE — ED PROVIDER NOTE - OBJECTIVE STATEMENT
History obtained from manager [phone]    55 year old female with seizure disorder (last seizure 1 week ago), intellectual disability, cerebral palsy, nonverbal at baseline, chronic constipation, s/p PEG tube, presenting with 2 episodes of NBNB vomiting.  Patient also had a foul-smelling urine a day prior per the management of her unit.  Per manager, has been afebrile.  Patient received Jevity bolus feeds at 6am and patient vomited at 3PM.  Per manager, patient has also been coughing. No diarrhea. History obtained from manager at facility [phone]    55 year old female with seizure disorder (last seizure 1 week ago), intellectual disability, cerebral palsy, nonverbal at baseline, chronic constipation, s/p PEG tube, presenting with 2 episodes of NBNB vomiting.  Patient also had a foul-smelling urine a day prior per the management of her unit.  Per manager, has been afebrile.  Patient received Jevity bolus feeds at 6am and patient vomited at 3PM.  Per manager, patient has also been coughing. No diarrhea. Is in NAD at arrival, though becomes agitated when is touched by staff in ED

## 2020-10-30 NOTE — ED ADULT TRIAGE NOTE - CHIEF COMPLAINT QUOTE
Pt w/ hx of Leukemia, seizures feeding tube in place brought in from [facility] [place] for vomiting appearing yellow and red, Pt sounds congested, Per EMS Pt is nonverbal at baseline.  Pt is alert and responds to her name, Pt does not cooperate with vital signs, unable to obtain temperature Pt w/ hx of Leukemia, seizures feeding tube in place brought in from [facility] in [facility] [place] arrives with staff,  for vomiting appearing yellow and red, Pt sounds congested, Per EMS Pt is nonverbal at baseline.  Pt is alert and responds to her name, Pt does not cooperate with vital signs, unable to obtain temperature

## 2020-10-30 NOTE — ED PROVIDER NOTE - PROGRESS NOTE DETAILS
Stephie Ignacio, resident MD: pt was signed out to me. mild elevation in lactate likely 2/2 dehydration, s/p hydration and no acute findings on blood work. soft abdomen, covid negative, will discharge back to group home. discussed return precautions with aide at bedside.  was consulted for transport.

## 2020-10-31 LAB
CULTURE RESULTS: SIGNIFICANT CHANGE UP
SPECIMEN SOURCE: SIGNIFICANT CHANGE UP

## 2020-12-09 ENCOUNTER — APPOINTMENT (OUTPATIENT)
Dept: UROLOGY | Facility: CLINIC | Age: 55
End: 2020-12-09
Payer: MEDICARE

## 2020-12-09 DIAGNOSIS — Z87.19 PERSONAL HISTORY OF OTHER DISEASES OF THE DIGESTIVE SYSTEM: ICD-10-CM

## 2020-12-09 DIAGNOSIS — Z86.69 PERSONAL HISTORY OF OTHER DISEASES OF THE NERVOUS SYSTEM AND SENSE ORGANS: ICD-10-CM

## 2020-12-09 DIAGNOSIS — Z86.59 PERSONAL HISTORY OF OTHER MENTAL AND BEHAVIORAL DISORDERS: ICD-10-CM

## 2020-12-09 PROCEDURE — 99203 OFFICE O/P NEW LOW 30 MIN: CPT

## 2020-12-09 NOTE — ASSESSMENT
[FreeTextEntry1] : This is a 55-year-old woman with a history of reported urinary tract infections noted with foul odor of the urine.  None of these have been febrile UTIs and there is no known history of pyelonephritis.  I think it would be reasonable here to give her prophylaxis given her recurrent symptoms and difficulty with communicating symptoms.  She has a G-tube for feeding and does not take pills.  I have given her a single strength Bactrim to use through the G-tube which she will be provided by the staff at her group home.  I would also recommend an ultrasound to evaluate the kidneys for any evidence of stone disease.  This will be done to ensure the lack of any significant stone burden that may be associated with such infections.  As she is relatively immobile, she is at risk for upper tract stones.  This will apparently require sedation as the patient has required sedation even for ultrasound in the past.  She tends to get quite combative otherwise.

## 2020-12-09 NOTE — REVIEW OF SYSTEMS
[Fever] : no fever [Chills] : no chills [Nosebleeds] : no nosebleeds [Nasal Discharge] : no nasal discharge [Lower Ext Edema] : no lower extremity edema [Shortness Of Breath] : no shortness of breath [Wheezing] : no wheezing [Vomiting] : no vomiting [Limb Weakness] : limb weakness [Difficulty Walking] : difficulty walking [see HPI] : see HPI [Easy Bleeding] : no tendency for easy bleeding

## 2020-12-09 NOTE — HISTORY OF PRESENT ILLNESS
[FreeTextEntry1] : Mariah Ponce presents to the office today.  She is a 55-year-old woman with history of cerebral palsy and profound intellectual disability.  She is nonverbal and nonambulatory.  She is here in a wheelchair.  She is with a formal caregiver from the group home where she lives.  She is here because she has apparently had issues with recurring urinary tract infections.  As the patient is noncommunicative, the aides at the group [facility] have noticed the infections when her urine takes on a very foul odor, almost chemical in nature by their report.  She has been hospitalized previously for urinary tract infection treatment for several days, just last month at [facility].  Currently she is not having any of the symptoms of foul odor in the urine.  There have been no fevers or chills.  The patient does not have any known history of kidney stones.

## 2020-12-09 NOTE — PHYSICAL EXAM
[General Appearance - In No Acute Distress] : no acute distress [Edema] : no peripheral edema [Respiration, Rhythm And Depth] : normal respiratory rhythm and effort [Exaggerated Use Of Accessory Muscles For Inspiration] : no accessory muscle use [Abdomen Soft] : soft [Abdomen Tenderness] : non-tender [Costovertebral Angle Tenderness] : no ~M costovertebral angle tenderness [Urinary Bladder Findings] : the bladder was normal on palpation [] : no rash [FreeTextEntry1] : profound intellectual disability.  [Not Anxious] : not anxious

## 2020-12-26 DIAGNOSIS — Z01.818 ENCOUNTER FOR OTHER PREPROCEDURAL EXAMINATION: ICD-10-CM

## 2020-12-27 ENCOUNTER — APPOINTMENT (OUTPATIENT)
Dept: DISASTER EMERGENCY | Facility: CLINIC | Age: 55
End: 2020-12-27

## 2020-12-30 ENCOUNTER — APPOINTMENT (OUTPATIENT)
Dept: ULTRASOUND IMAGING | Facility: HOSPITAL | Age: 55
End: 2020-12-30

## 2021-01-01 NOTE — PROGRESS NOTE ADULT - ATTENDING COMMENTS
Agree with above   -tele with episodes of Wenckebach  -avoid avn blocking agents for now    Katlyn Moya MD
Agree with above assessment and plan as outlined above.    - f/u TTE    Michael Gresham MD, FACC
Agree with above assessment and plan as outlined above.    - f/u echo    Michael Gresham MD, FACC
Agree with above.   -avoid avn blocking agents  -check tte - can be done as outpatient if unable to perform as inpatient    Katlyn Moya MD
Overall management per MICU team .
Patient seen and examined.  Agree with above.   -check TTE  -if TTE cannot be performed as inpatient, can be done as outpatient    Katlyn Moya MD
Patient seen and examined.  Agree with above.   -check tte  -seizure workup and treatment per neuro    Katlyn Moya MD
Patient seen and examined.  Agree with above.   No further inpatient cardiac workup needed at this time.     Katlyn Moya MD
agree with above  -avoid avn blocking agents  -check tte    Katlyn Moya MD
agree with above  check tte    Katlyn Moya MD
53 year old woman with CP and seizure disorder was in the ICU for management of her medications appears that two of her mediations had been discontinued   please restart lacosamide and Fosphenytoin  patient is able to protect airway  not MICU candidate at this time  please call with questions
Agree with above.   -check TTE to assess LV function    Katlyn Moya MD
Agree with above. Seen and examined with residents. Seems that the seizures have subsided with 4 agents. Will discuss with neurology titration of drugs. Stable for transfer from the ICU.
Patient seen and examined.  Agree with above.   -avoid avn blocking agents  -check tte    Katlyn Moya MD
Patient seen and examined.  Agree with above.   -check tte  -follow up neuro    Katlyn Moya MD
Seen and examined on rounds with housestaff.  Multiple seizures reported by nursing staff overnight.  In light of thrombocytopenia, will switch to fosphenytoin and start maintenance at 100 TID IV or 300 PO QHS.  Load with 1500 mg IV Keppra.  Check dilantin level this afternoon, with goal level 15-20.
patient seen and examined agree with above
still seizing but improved/titrating up meds/very guarded
Agree with above. Seen and examined with residents. CP with history of seizure disorder with status epilepticus. Now seizures under control. Will start tapering down meds as tolerated. Neurology input appreciated.
Agree with above. Seen and examined with residents. More awake. Stable respiratory status. Supportive care, seizure medications. Bedbound, totally dependent on help for ADL's. Continue anti-seizure medications.
Seen and examined on rounds with housestaff.  Dilantin level 13.    No seizures after dilantin load.  Can switch to  QHS.  Will defer Vimpat at this time.  Patient at baseline this morning.
agree with above  check tte  follow up neuro    Katlyn Moya MD
Agree with above. Seen and examined with resident. Much more awake today. Bedside swallow study. Follow up on EEG results on lower doses of meds.
status epilepticus/guarded
Seen and examined . More awake . Ate lunch . Continuing same treatment .
Seen and examined  . More awake . Eating . Adjusting Seizure meds as level low . Weaning off oxygen . Septic work up negative so far.
Seen and examined . More awake . AID in room . Neurology helping with adjustment of her seizure meds. Will asses her for PO .
Seen and examined . Mother by bedside . Sleepy . Agree with above A/P .
Seen and examined . Having recurrent seizures . Neurology follow up noted . Seizure meds adjusted . D/W resident and will reconsult mICU.
Seen and examined . Doing very well today . More awake and eating fine. Dc planning.
Seen and examined . S&S evaluation so started dysphagia diet.
Seen and examined . Sleepy today and ate 10% per HHA . No more seizures.
20.47

## 2021-02-21 ENCOUNTER — INPATIENT (INPATIENT)
Facility: HOSPITAL | Age: 56
LOS: 3 days | Discharge: NURSING, REHAB OR LONG-TERM CARE | DRG: 100 | End: 2021-02-25
Attending: PSYCHIATRY & NEUROLOGY | Admitting: PSYCHIATRY & NEUROLOGY
Payer: MEDICARE

## 2021-02-21 VITALS
DIASTOLIC BLOOD PRESSURE: 62 MMHG | SYSTOLIC BLOOD PRESSURE: 148 MMHG | HEIGHT: 60 IN | OXYGEN SATURATION: 100 % | WEIGHT: 160.06 LBS | RESPIRATION RATE: 20 BRPM | HEART RATE: 90 BPM

## 2021-02-21 DIAGNOSIS — G40.909 EPILEPSY, UNSPECIFIED, NOT INTRACTABLE, WITHOUT STATUS EPILEPTICUS: ICD-10-CM

## 2021-02-21 DIAGNOSIS — Z93.1 GASTROSTOMY STATUS: Chronic | ICD-10-CM

## 2021-02-21 LAB
ALBUMIN SERPL ELPH-MCNC: 3.6 G/DL — SIGNIFICANT CHANGE UP (ref 3.3–5)
ALP SERPL-CCNC: 74 U/L — SIGNIFICANT CHANGE UP (ref 40–120)
ALT FLD-CCNC: 11 U/L — SIGNIFICANT CHANGE UP (ref 10–45)
ANION GAP SERPL CALC-SCNC: 10 MMOL/L — SIGNIFICANT CHANGE UP (ref 5–17)
AST SERPL-CCNC: 19 U/L — SIGNIFICANT CHANGE UP (ref 10–40)
BASOPHILS # BLD AUTO: 0.04 K/UL — SIGNIFICANT CHANGE UP (ref 0–0.2)
BASOPHILS NFR BLD AUTO: 0.4 % — SIGNIFICANT CHANGE UP (ref 0–2)
BILIRUB SERPL-MCNC: 0.2 MG/DL — SIGNIFICANT CHANGE UP (ref 0.2–1.2)
BUN SERPL-MCNC: 13 MG/DL — SIGNIFICANT CHANGE UP (ref 7–23)
CALCIUM SERPL-MCNC: 9.1 MG/DL — SIGNIFICANT CHANGE UP (ref 8.4–10.5)
CARBAMAZEPINE SERPL-MCNC: <5 UG/ML — SIGNIFICANT CHANGE UP (ref 4–12)
CHLORIDE SERPL-SCNC: 95 MMOL/L — LOW (ref 96–108)
CO2 SERPL-SCNC: 30 MMOL/L — SIGNIFICANT CHANGE UP (ref 22–31)
CREAT SERPL-MCNC: 0.43 MG/DL — LOW (ref 0.5–1.3)
EOSINOPHIL # BLD AUTO: 0.09 K/UL — SIGNIFICANT CHANGE UP (ref 0–0.5)
EOSINOPHIL NFR BLD AUTO: 0.9 % — SIGNIFICANT CHANGE UP (ref 0–6)
GLUCOSE SERPL-MCNC: 86 MG/DL — SIGNIFICANT CHANGE UP (ref 70–99)
HCT VFR BLD CALC: 40.8 % — SIGNIFICANT CHANGE UP (ref 34.5–45)
HGB BLD-MCNC: 13.3 G/DL — SIGNIFICANT CHANGE UP (ref 11.5–15.5)
IMM GRANULOCYTES NFR BLD AUTO: 0.2 % — SIGNIFICANT CHANGE UP (ref 0–1.5)
LYMPHOCYTES # BLD AUTO: 2.14 K/UL — SIGNIFICANT CHANGE UP (ref 1–3.3)
LYMPHOCYTES # BLD AUTO: 21.3 % — SIGNIFICANT CHANGE UP (ref 13–44)
MCHC RBC-ENTMCNC: 31.7 PG — SIGNIFICANT CHANGE UP (ref 27–34)
MCHC RBC-ENTMCNC: 32.6 GM/DL — SIGNIFICANT CHANGE UP (ref 32–36)
MCV RBC AUTO: 97.1 FL — SIGNIFICANT CHANGE UP (ref 80–100)
MONOCYTES # BLD AUTO: 1.38 K/UL — HIGH (ref 0–0.9)
MONOCYTES NFR BLD AUTO: 13.7 % — SIGNIFICANT CHANGE UP (ref 2–14)
NEUTROPHILS # BLD AUTO: 6.37 K/UL — SIGNIFICANT CHANGE UP (ref 1.8–7.4)
NEUTROPHILS NFR BLD AUTO: 63.5 % — SIGNIFICANT CHANGE UP (ref 43–77)
NRBC # BLD: 0 /100 WBCS — SIGNIFICANT CHANGE UP (ref 0–0)
PLATELET # BLD AUTO: 273 K/UL — SIGNIFICANT CHANGE UP (ref 150–400)
POTASSIUM SERPL-MCNC: 4.4 MMOL/L — SIGNIFICANT CHANGE UP (ref 3.5–5.3)
POTASSIUM SERPL-SCNC: 4.4 MMOL/L — SIGNIFICANT CHANGE UP (ref 3.5–5.3)
PROT SERPL-MCNC: 7.8 G/DL — SIGNIFICANT CHANGE UP (ref 6–8.3)
RBC # BLD: 4.2 M/UL — SIGNIFICANT CHANGE UP (ref 3.8–5.2)
RBC # FLD: 13.1 % — SIGNIFICANT CHANGE UP (ref 10.3–14.5)
SODIUM SERPL-SCNC: 135 MMOL/L — SIGNIFICANT CHANGE UP (ref 135–145)
VALPROATE SERPL-MCNC: 36 UG/ML — LOW (ref 50–100)
WBC # BLD: 10.04 K/UL — SIGNIFICANT CHANGE UP (ref 3.8–10.5)
WBC # FLD AUTO: 10.04 K/UL — SIGNIFICANT CHANGE UP (ref 3.8–10.5)

## 2021-02-21 PROCEDURE — 99285 EMERGENCY DEPT VISIT HI MDM: CPT | Mod: GC

## 2021-02-21 PROCEDURE — 70450 CT HEAD/BRAIN W/O DYE: CPT | Mod: 26,MG

## 2021-02-21 PROCEDURE — 71045 X-RAY EXAM CHEST 1 VIEW: CPT | Mod: 26

## 2021-02-21 PROCEDURE — 99223 1ST HOSP IP/OBS HIGH 75: CPT

## 2021-02-21 PROCEDURE — G1004: CPT

## 2021-02-21 RX ORDER — FERROUS SULFATE 325(65) MG
300 TABLET ORAL DAILY
Refills: 0 | Status: DISCONTINUED | OUTPATIENT
Start: 2021-02-21 | End: 2021-02-25

## 2021-02-21 RX ORDER — SODIUM CHLORIDE 9 MG/ML
1000 INJECTION, SOLUTION INTRAVENOUS
Refills: 0 | Status: DISCONTINUED | OUTPATIENT
Start: 2021-02-21 | End: 2021-02-25

## 2021-02-21 RX ORDER — DEXTROSE 50 % IN WATER 50 %
12.5 SYRINGE (ML) INTRAVENOUS ONCE
Refills: 0 | Status: DISCONTINUED | OUTPATIENT
Start: 2021-02-21 | End: 2021-02-25

## 2021-02-21 RX ORDER — SENNA PLUS 8.6 MG/1
2 TABLET ORAL AT BEDTIME
Refills: 0 | Status: DISCONTINUED | OUTPATIENT
Start: 2021-02-21 | End: 2021-02-25

## 2021-02-21 RX ORDER — DEXTROSE 50 % IN WATER 50 %
25 SYRINGE (ML) INTRAVENOUS ONCE
Refills: 0 | Status: DISCONTINUED | OUTPATIENT
Start: 2021-02-21 | End: 2021-02-25

## 2021-02-21 RX ORDER — BUDESONIDE, MICRONIZED 100 %
0.5 POWDER (GRAM) MISCELLANEOUS
Refills: 0 | Status: DISCONTINUED | OUTPATIENT
Start: 2021-02-21 | End: 2021-02-25

## 2021-02-21 RX ORDER — DEXTROSE 50 % IN WATER 50 %
15 SYRINGE (ML) INTRAVENOUS ONCE
Refills: 0 | Status: DISCONTINUED | OUTPATIENT
Start: 2021-02-21 | End: 2021-02-25

## 2021-02-21 RX ORDER — VALPROIC ACID (AS SODIUM SALT) 250 MG/5ML
750 SOLUTION, ORAL ORAL EVERY 8 HOURS
Refills: 0 | Status: DISCONTINUED | OUTPATIENT
Start: 2021-02-21 | End: 2021-02-24

## 2021-02-21 RX ORDER — LACOSAMIDE 50 MG/1
150 TABLET ORAL EVERY 12 HOURS
Refills: 0 | Status: DISCONTINUED | OUTPATIENT
Start: 2021-02-21 | End: 2021-02-23

## 2021-02-21 RX ORDER — GLUCAGON INJECTION, SOLUTION 0.5 MG/.1ML
1 INJECTION, SOLUTION SUBCUTANEOUS ONCE
Refills: 0 | Status: DISCONTINUED | OUTPATIENT
Start: 2021-02-21 | End: 2021-02-25

## 2021-02-21 RX ORDER — INSULIN LISPRO 100/ML
VIAL (ML) SUBCUTANEOUS EVERY 6 HOURS
Refills: 0 | Status: DISCONTINUED | OUTPATIENT
Start: 2021-02-21 | End: 2021-02-25

## 2021-02-21 RX ORDER — ENOXAPARIN SODIUM 100 MG/ML
40 INJECTION SUBCUTANEOUS DAILY
Refills: 0 | Status: DISCONTINUED | OUTPATIENT
Start: 2021-02-21 | End: 2021-02-25

## 2021-02-21 NOTE — ED PROVIDER NOTE - CLINICAL SUMMARY MEDICAL DECISION MAKING FREE TEXT BOX
Patient is a resident of [facility]. PMH Seizure D/O on Valproic acid, Carbamzepine, Asthma, Cerebral palsy, Encephalopahty, presenting with seizures over past three days. has chronic cough - eval for acute consolidation. Head ct to eval for bleed or mass. Labs to eval for electrolytes. Medication level. Neuro consult. Patient is a resident of [facility]. PMH Seizure D/O on Valproic acid, Carbamazepine, Asthma, Cerebral palsy, Encephalopathy, presenting with seizures over past three days. has chronic cough - eval for acute consolidation. Head ct to eval for bleed or mass. Labs to eval for electrolytes. Medication level. Neuro consult.

## 2021-02-21 NOTE — ED PROVIDER NOTE - OBJECTIVE STATEMENT
History obtained from the health aide from group home at the bedside  55 year old female with seizure disorder, intellectual disability, cerebral palsy, nonverbal at baseline, chronic constipation, s/p PEG tube, presents with three seizures over past 1 day. Pt is compliant with  Valproic acid, Carbamazepine. No known head trauma, fever, vomiting. Has been tolerating her tube feeds. Has chronic cough.

## 2021-02-21 NOTE — H&P ADULT - NSHPPHYSICALEXAM_GEN_ALL_CORE
PHYSICAL EXAM:  General: Adult obese female in bed, comfortable appearing  MSK: LE appear contracted at knee    Neurologic:  Mental Status: Awake, alert, attentive, oriented to person    Cranial Nerves: PERRLA (R = 2mm, L = 2mm). No facial asymmetry b/l, full eye closure strength b/l.  Tongue midline, normal movements, no atrophy.    Motor: Normal muscle bulk & increased tone in LE BL. No noticeable tremor, myoclonus or pronator drift. No asymmetries in withdrawal during pain testing.     Sensation: Symmetric B/L preserved sensation to pain.      Reflexes: 3/4.  Plantar Responses are upgoing B/L.

## 2021-02-21 NOTE — ED PROVIDER NOTE - PROGRESS NOTE DETAILS
Margret Serrano DO PGY-2: discussed case with neurology at 535pm. Keegan Cortes MD (PGY-1): Spoke to neuro. Wants us to get u/a and covid swab and admit to Dr. Bonilla in EMU.

## 2021-02-21 NOTE — H&P ADULT - HISTORY OF PRESENT ILLNESS
Patient is a 54yo R-handed lady with a PMH of s who presents to the facility on the afternoon 2/21/21 regarding 3 breakthrough seizures sustained whilst at group home earlier in the day. Patient possesses a past medical history significant for seizure disorder, intellectual disability, cerebral palsy, asthma, nonverbal at baseline, chronic constipation, s/p PEG tube, presents with three seizures over past 1 day. Patient is reportedly compliant with Valproic acid 750mg oral solution q12h , Carbamazepine (100mg qid oral solution) and Vimpat 150mg bid. Seizure activity took the form of BL UE convulsive activity (without incontinence or tongue bite) that lasted roughly 45 seconds and were spaced by roughly one hour. There has been no known head trauma, fever, or vomiting. Patient has been tolerating her tube feeds and has a history of chronic cough. Patient has been prescribed a number of AEDs in the past including, Onfi, Phenytoin, Keppra, Vimpat and has been seen by Dr. Fisher and Dr. Skinner in 2018 and 2019. VPA level here is 36 and Carbamazepine level is <5 suggesting inconsistent medical reconciliation. CT Head noncontrast demonstrates: No acute intracranial pathology. CXR: without emergent findings. Patient to be admitted to EMU for medication titration. Facility number: [id number]

## 2021-02-21 NOTE — H&P ADULT - ATTENDING COMMENTS
intractable symptomatic epilepsy with recent seizure likely a combination of incomplete control and UTI  plan: agree with holding Tegretol but monitor for risk of VPA toxicity    continue  TID FOR now, repeat level in am PRIOR to next dose  continue Vimpat 150 BID IV

## 2021-02-21 NOTE — H&P ADULT - NSHPLABSRESULTS_GEN_ALL_CORE
13.3   10.04 )-----------( 273      ( 21 Feb 2021 17:10 )             40.8     02-21    135  |  95<L>  |  13  ----------------------------<  86  4.4   |  30  |  0.43<L>    Ca    9.1      21 Feb 2021 17:10    TPro  7.8  /  Alb  3.6  /  TBili  0.2  /  DBili  x   /  AST  19  /  ALT  11  /  AlkPhos  74  02-21    CAPILLARY BLOOD GLUCOSE      POCT Blood Glucose.: 82 mg/dL (21 Feb 2021 16:30)        I&O's Summary    Vital Signs Last 24 Hrs  T(C): 36.7 (21 Feb 2021 18:31), Max: 36.9 (21 Feb 2021 16:31)  T(F): 98 (21 Feb 2021 18:31), Max: 98.5 (21 Feb 2021 16:31)  HR: 84 (21 Feb 2021 18:31) (84 - 90)  BP: 103/71 (21 Feb 2021 18:31) (103/71 - 148/62)  BP(mean): 77 (21 Feb 2021 18:31) (77 - 77)  RR: 16 (21 Feb 2021 18:31) (16 - 20)  SpO2: 98% (21 Feb 2021 18:31) (95% - 100%)            CT Head No Cont (02.21.21 @ 17:56)     FINDINGS: Limited study secondary to moderate motion artifact.  There is no compelling evidence for an acute transcortical infarction. There is no evidence of mass, mass effect, midline shift or extra-axial fluid collection. The lateral ventricles and cortical sulci are age-appropriate in size and configuration. The orbits, mastoid air cells and visualized paranasal sinuses are unremarkable. The calvarium is intact.    IMPRESSION: Limited study. No acute intracranial pathology. Follow-up imaging is recommended.

## 2021-02-21 NOTE — ED PROVIDER NOTE - ATTENDING CONTRIBUTION TO CARE
Private Physician  55y female resident of group home. PMH Seizure D/O on Valproic acid,Carbamzepine, Asthma, Cerebral palsy, Encephalopahty, Intectual disabilty.Peg dependant. Pt comes to ed w c/o #3 seizures today. No fever chills, cp, shortness of breath, Pt nonverbal, contracted, bedbound at baseline. PE adult female awake alert obvious MR, Heent normocephalic atraumatic chest clear cv no rgm abd soft +bs no rebound guarding. Neuro awake alert nonverbal contracted moving upper extr  Rajat Vang MD, Facep

## 2021-02-21 NOTE — ED ADULT TRIAGE NOTE - CHIEF COMPLAINT QUOTE
c/o seizures at 3pm lasted 20 secs. hx of absence seizures, pt is coming from group home and has aide here with her.

## 2021-02-21 NOTE — ED ADULT NURSE NOTE - NSIMPLEMENTINTERV_GEN_ALL_ED
Implemented All Fall Risk Interventions:  [place] to call system. Call bell, personal items and telephone within reach. Instruct patient to call for assistance. Room bathroom lighting operational. Non-slip footwear when patient is off stretcher. Physically safe environment: no spills, clutter or unnecessary equipment. Stretcher in lowest position, wheels locked, appropriate side rails in place. Provide visual cue, wrist band, yellow gown, etc. Monitor gait and stability. Monitor for mental status changes and reorient to person, place, and time. Review medications for side effects contributing to fall risk. Reinforce activity limits and safety measures with patient and family.

## 2021-02-21 NOTE — ED PROVIDER NOTE - PHYSICAL EXAMINATION
Gen: non toxic appearing, NAD  Head: NC/NT  ENT: airway patent, mmm  CV: RRR, +S1/S2  Resp: no wheezing/rhonchi, equal breath sounds  GI:  abdomen soft non-distended, PEG tube in place without surrounding erythema or pus  Neuro: A&Ox0, not following commands, moving all four extremities spontaneously

## 2021-02-21 NOTE — H&P ADULT - ASSESSMENT
54yo R-handed lady with a PMH of s who presents to the facility on the afternoon 2/21/21 regarding 3 breakthrough seizures sustained whilst at group home earlier in the day. Patient possesses a past medical history significant for seizure disorder, intellectual disability, cerebral palsy, asthma, nonverbal at baseline, chronic constipation, s/p PEG tube, presents with three seizures over past 1 day. Patient is reportedly compliant with Valproic acid 750mg oral solution q12h , Carbamazepine (100mg qid oral solution) and Vimpat 150mg bid. Seizure activity took the form of BL UE convulsive activity (without incontinence or tongue bite) that lasted roughly 45 seconds and were spaced by roughly one hour. There has been no known head trauma, fever, or vomiting. Patient has been tolerating her tube feeds and has a history of chronic cough. Patient has been prescribed a number of AEDs in the past including, Onfi, Phenytoin, Keppra, Vimpat and has been seen by Dr. Fisher and Dr. Skinner in 2018 and 2019. VPA level here is 36 and Carbamazepine level is <5 suggesting inconsistent medical reconciliation. CT Head noncontrast demonstrates: No acute intracranial pathology. CXR: without emergent findings. Patient to be admitted to EMU for medication titration. Facility number: [phone]    Impression: Breakthrough seizure 2/2  AED regiment inconsistencies    Plan:    Continue to investigate toxic/metabolic/infectious insults (known recurrent UTI's)  Increase VPA 750mg q8h IV, obtain levels in AM  C/w Vimpat 150mg bid IV  Hold Tegretol for now  Continue other pertinent home medications  Seizure and Fall precautions  Ativan 2mg IV push as rescue

## 2021-02-21 NOTE — ED ADULT NURSE NOTE - OBJECTIVE STATEMENT
Pt is a 55 Y F with hx of intellectual disability, cerebral palsy, seizure disorder presenting to ED via EMS from group home with c/o seizures. Per EMS, pt is non-verbal, bedbound, cannot move bilateral lower extremities at baseline. EMS states pt had absence seizure at 1 AM, 3 AM, 3 PM, and 3:02 PM, each episode lasting approx 20 seconds. Pt arrives to ED breathing unlabored on RA, awake and alert, non-verbal. Abd soft, non-tender, non-distended. Skin is warm and dry. Pt able to move bilateral upper extremities. IV established. Safety and comfort maintained.

## 2021-02-21 NOTE — ED ADULT NURSE REASSESSMENT NOTE - NS ED NURSE REASSESS COMMENT FT1
Pt is breathing unlabored on RA. VSS. Educated pt on plan of care. Safety and comfort maintained. Awaiting neuro consult. Pt aid at bedside.

## 2021-02-22 LAB
A1C WITH ESTIMATED AVERAGE GLUCOSE RESULT: 5.1 % — SIGNIFICANT CHANGE UP (ref 4–5.6)
APPEARANCE UR: CLEAR — SIGNIFICANT CHANGE UP
BACTERIA # UR AUTO: NEGATIVE — SIGNIFICANT CHANGE UP
BILIRUB UR-MCNC: NEGATIVE — SIGNIFICANT CHANGE UP
CK SERPL-CCNC: 47 U/L — SIGNIFICANT CHANGE UP (ref 25–170)
COLOR SPEC: YELLOW — SIGNIFICANT CHANGE UP
DIFF PNL FLD: NEGATIVE — SIGNIFICANT CHANGE UP
EPI CELLS # UR: 1 /HPF — SIGNIFICANT CHANGE UP
ESTIMATED AVERAGE GLUCOSE: 100 MG/DL — SIGNIFICANT CHANGE UP (ref 68–114)
GLUCOSE BLDC GLUCOMTR-MCNC: 71 MG/DL — SIGNIFICANT CHANGE UP (ref 70–99)
GLUCOSE BLDC GLUCOMTR-MCNC: 75 MG/DL — SIGNIFICANT CHANGE UP (ref 70–99)
GLUCOSE BLDC GLUCOMTR-MCNC: 78 MG/DL — SIGNIFICANT CHANGE UP (ref 70–99)
GLUCOSE BLDC GLUCOMTR-MCNC: 88 MG/DL — SIGNIFICANT CHANGE UP (ref 70–99)
GLUCOSE BLDC GLUCOMTR-MCNC: 92 MG/DL — SIGNIFICANT CHANGE UP (ref 70–99)
GLUCOSE BLDC GLUCOMTR-MCNC: 93 MG/DL — SIGNIFICANT CHANGE UP (ref 70–99)
GLUCOSE UR QL: NEGATIVE — SIGNIFICANT CHANGE UP
HYALINE CASTS # UR AUTO: 0 /LPF — SIGNIFICANT CHANGE UP (ref 0–2)
KETONES UR-MCNC: NEGATIVE — SIGNIFICANT CHANGE UP
LEUKOCYTE ESTERASE UR-ACNC: ABNORMAL
NITRITE UR-MCNC: NEGATIVE — SIGNIFICANT CHANGE UP
PH UR: 7.5 — SIGNIFICANT CHANGE UP (ref 5–8)
PROT UR-MCNC: SIGNIFICANT CHANGE UP
RBC CASTS # UR COMP ASSIST: 4 /HPF — SIGNIFICANT CHANGE UP (ref 0–4)
SARS-COV-2 RNA SPEC QL NAA+PROBE: SIGNIFICANT CHANGE UP
SP GR SPEC: 1.02 — SIGNIFICANT CHANGE UP (ref 1.01–1.02)
UROBILINOGEN FLD QL: NEGATIVE — SIGNIFICANT CHANGE UP
VALPROATE SERPL-MCNC: 77 UG/ML — SIGNIFICANT CHANGE UP (ref 50–100)
WBC UR QL: 5 /HPF — SIGNIFICANT CHANGE UP (ref 0–5)

## 2021-02-22 PROCEDURE — 95720 EEG PHY/QHP EA INCR W/VEEG: CPT

## 2021-02-22 PROCEDURE — 99233 SBSQ HOSP IP/OBS HIGH 50: CPT

## 2021-02-22 PROCEDURE — 49465 FLUORO EXAM OF G/COLON TUBE: CPT

## 2021-02-22 RX ORDER — SODIUM CHLORIDE 9 MG/ML
1000 INJECTION, SOLUTION INTRAVENOUS
Refills: 0 | Status: DISCONTINUED | OUTPATIENT
Start: 2021-02-22 | End: 2021-02-25

## 2021-02-22 RX ADMIN — Medication 300 MILLIGRAM(S): at 12:58

## 2021-02-22 RX ADMIN — Medication 25 MILLIGRAM(S): at 00:00

## 2021-02-22 RX ADMIN — SODIUM CHLORIDE 50 MILLILITER(S): 9 INJECTION, SOLUTION INTRAVENOUS at 21:15

## 2021-02-22 RX ADMIN — Medication 10 MILLILITER(S): at 12:58

## 2021-02-22 RX ADMIN — Medication 0.5 MILLIGRAM(S): at 04:30

## 2021-02-22 RX ADMIN — LACOSAMIDE 130 MILLIGRAM(S): 50 TABLET ORAL at 04:28

## 2021-02-22 RX ADMIN — ENOXAPARIN SODIUM 40 MILLIGRAM(S): 100 INJECTION SUBCUTANEOUS at 12:58

## 2021-02-22 RX ADMIN — Medication 25 MILLIGRAM(S): at 04:28

## 2021-02-22 RX ADMIN — Medication 25 MILLIGRAM(S): at 21:15

## 2021-02-22 RX ADMIN — LACOSAMIDE 130 MILLIGRAM(S): 50 TABLET ORAL at 17:15

## 2021-02-22 RX ADMIN — SENNA PLUS 2 TABLET(S): 8.6 TABLET ORAL at 21:15

## 2021-02-22 RX ADMIN — Medication 25 MILLIGRAM(S): at 12:59

## 2021-02-22 RX ADMIN — SODIUM CHLORIDE 50 MILLILITER(S): 9 INJECTION, SOLUTION INTRAVENOUS at 04:28

## 2021-02-22 RX ADMIN — Medication 0.5 MILLIGRAM(S): at 17:15

## 2021-02-22 NOTE — ED ADULT NURSE REASSESSMENT NOTE - NS ED NURSE REASSESS COMMENT FT1
Fingerstick taken which showed blood glucose of 71. CARON Leone made aware; according to MDs orders no dextrose to be given. Patient transported to [address] [place] [address] assisted by transporter.

## 2021-02-22 NOTE — PROGRESS NOTE ADULT - ASSESSMENT
54yo R-handed lady with a PMH of s who presents to the facility on the afternoon 2/21/21 regarding 3 breakthrough seizures sustained whilst at group home earlier in the day. Patient possesses a past medical history significant for seizure disorder, intellectual disability, cerebral palsy, asthma, nonverbal at baseline, chronic constipation, s/p PEG tube, presents with three seizures over past 1 day. Patient is reportedly compliant with Valproic acid 750mg oral solution q12h , Carbamazepine (100mg qid oral solution) and Vimpat 150mg bid. Seizure activity took the form of BL UE convulsive activity (without incontinence or tongue bite) that lasted roughly 45 seconds and were spaced by roughly one hour. There has been no known head trauma, fever, or vomiting. Patient has been tolerating her tube feeds and has a history of chronic cough. Patient has been prescribed a number of AEDs in the past including, Onfi, Phenytoin, Keppra, Vimpat and has been seen by Dr. Fisher and Dr. Skinner in 2018 and 2019. VPA level here is 36 and Carbamazepine level is <5 suggesting inconsistent medical reconciliation. CT Head noncontrast demonstrates: No acute intracranial pathology. CXR: without emergent findings. Patient to be admitted to EMU for medication titration. Facility number: [id number]    Impression: Breakthrough seizure 2/2  AED regiment inconsistencies    Plan:    Continue to investigate toxic/metabolic/infectious insults (known recurrent UTI's)  Increase VPA 750mg q8h IV, obtain levels in AM  C/w Vimpat 150mg bid IV  Hold Tegretol for now  Continue other pertinent home medications  Seizure and Fall precautions  Ativan 2mg IV push as rescue     56yo R-handed lady with a PMH of s who presents to the facility on the afternoon 2/21/21 regarding 3 breakthrough seizures sustained whilst at group home earlier in the day. Patient possesses a past medical history significant for seizure disorder, intellectual disability, cerebral palsy, asthma, nonverbal at baseline, chronic constipation, s/p PEG tube, presents with three seizures over past 1 day. Patient is reportedly compliant with Valproic acid 750mg oral solution q12h , Carbamazepine (100mg qid oral solution) and Vimpat 150mg bid. Seizure activity took the form of BL UE convulsive activity (without incontinence or tongue bite) that lasted roughly 45 seconds and were spaced by roughly one hour. There has been no known head trauma, fever, or vomiting. Patient has been tolerating her tube feeds and has a history of chronic cough. Patient has been prescribed a number of AEDs in the past including, Onfi, Phenytoin, Keppra, Vimpat and has been seen by Dr. Fisher and Dr. Skinner in 2018 and 2019. VPA level here is 36 and Carbamazepine level is <5 suggesting inconsistent medical reconciliation. CT Head noncontrast demonstrates: No acute intracranial pathology. CXR: without emergent findings. Patient admitted to EMU for medication titration. Facility number: [id number]    Impression: Breakthrough seizure 2/2  AED regiment inconsistencies    Plan:    [] Continue to investigate toxic/metabolic/infectious insults (known recurrent UTI's)  [] VPA 750mg q8h IV  [] Vimpat 150mg bid IV  [] Hold Tegretol for now  [] Continue other pertinent home medications  [] Seizure and Fall precautions  [] Ativan 2mg IV push as rescue  [] daily valproic acid and carbamazepine levels, VPA free level  [x] PEG placement confirmed and feeds started

## 2021-02-22 NOTE — PROGRESS NOTE ADULT - PROBLEM SELECTOR PLAN 6
- s/p pressors in MICU  - BP stable off midodrine Split-Thickness Skin Graft Text: The defect edges were debeveled with a #15 scalpel blade.  Given the location of the defect, shape of the defect and the proximity to free margins a split thickness skin graft was deemed most appropriate.  Using a sterile surgical marker, the primary defect shape was transferred to the donor site. The split thickness graft was then harvested.  The skin graft was then placed in the primary defect and oriented appropriately.

## 2021-02-22 NOTE — ED ADULT NURSE REASSESSMENT NOTE - NS ED NURSE REASSESS COMMENT FT1
Patient currently resting comfortably in stretcher. Patient in no acute distress. PERRLA. Patient displays strength in bilateral upper extremities. Patient is weak in bilateral lower extremities. Patient pending admission to inpatient neuro unit. Plan of care discussed with patient. Safety and comfort maintained.

## 2021-02-23 LAB
CARBAMAZEPINE SERPL-MCNC: <5 UG/ML — SIGNIFICANT CHANGE UP (ref 4–12)
GLUCOSE BLDC GLUCOMTR-MCNC: 101 MG/DL — HIGH (ref 70–99)
GLUCOSE BLDC GLUCOMTR-MCNC: 114 MG/DL — HIGH (ref 70–99)
GLUCOSE BLDC GLUCOMTR-MCNC: 84 MG/DL — SIGNIFICANT CHANGE UP (ref 70–99)
VALPROATE SERPL-MCNC: 91 UG/ML — SIGNIFICANT CHANGE UP (ref 50–100)

## 2021-02-23 PROCEDURE — 95720 EEG PHY/QHP EA INCR W/VEEG: CPT

## 2021-02-23 PROCEDURE — 99233 SBSQ HOSP IP/OBS HIGH 50: CPT

## 2021-02-23 PROCEDURE — 74018 RADEX ABDOMEN 1 VIEW: CPT | Mod: 26

## 2021-02-23 RX ORDER — LACOSAMIDE 50 MG/1
200 TABLET ORAL ONCE
Refills: 0 | Status: DISCONTINUED | OUTPATIENT
Start: 2021-02-23 | End: 2021-02-23

## 2021-02-23 RX ORDER — LACOSAMIDE 50 MG/1
200 TABLET ORAL
Refills: 0 | Status: DISCONTINUED | OUTPATIENT
Start: 2021-02-23 | End: 2021-02-25

## 2021-02-23 RX ADMIN — SODIUM CHLORIDE 50 MILLILITER(S): 9 INJECTION, SOLUTION INTRAVENOUS at 22:43

## 2021-02-23 RX ADMIN — Medication 25 MILLIGRAM(S): at 04:38

## 2021-02-23 RX ADMIN — LACOSAMIDE 130 MILLIGRAM(S): 50 TABLET ORAL at 04:38

## 2021-02-23 RX ADMIN — LACOSAMIDE 140 MILLIGRAM(S): 50 TABLET ORAL at 21:53

## 2021-02-23 RX ADMIN — Medication 0.5 MILLIGRAM(S): at 04:38

## 2021-02-23 RX ADMIN — Medication 0.5 MILLIGRAM(S): at 18:47

## 2021-02-23 RX ADMIN — ENOXAPARIN SODIUM 40 MILLIGRAM(S): 100 INJECTION SUBCUTANEOUS at 12:12

## 2021-02-23 RX ADMIN — LACOSAMIDE 280 MILLIGRAM(S): 50 TABLET ORAL at 09:58

## 2021-02-23 RX ADMIN — Medication 25 MILLIGRAM(S): at 22:43

## 2021-02-23 RX ADMIN — Medication 10 MILLILITER(S): at 12:12

## 2021-02-23 RX ADMIN — Medication 25 MILLIGRAM(S): at 12:13

## 2021-02-23 RX ADMIN — Medication 300 MILLIGRAM(S): at 12:12

## 2021-02-23 RX ADMIN — LACOSAMIDE 140 MILLIGRAM(S): 50 TABLET ORAL at 14:15

## 2021-02-23 NOTE — PATIENT PROFILE ADULT - STATED REASON FOR ADMISSION
Pt unable to speak but as per group home, pt had a series of seizures prior to being brought in to ED via EMS

## 2021-02-23 NOTE — EEG REPORT - NS EEG TEXT BOX
Starting: Day 1   09:59 on 02/22/2021 to 08:00 on 02/23/2021 Duration 21:@3      AEDs:     lacosamide IVPB 200 milliGRAM(s) IV Q8h  valproate sodium IVPB 750 milliGRAM(s) IV Intermittent every 8 hours        _____________________________________________________________  STUDY INTERPRETATION    Findings: The background was continuous, spontaneously variable and reactive. No posterior dominant rhythm seen.    Background Slowing:  Diffuse theta and polymorphic delta slowing.    Focal Slowing:   None were present.    Sleep Background:  Stage II sleep transients were not recorded.    Other Non-Epileptiform Findings:  None were present.    Interictal Epileptiform Activity:   abundant runs of 5-15 seconds of centrally and left frontally predominant sharply contoured rhythmic delta activity of unclear epileptogenicity. Activity demonstrates clear onset and offset but poor evolution  frequent 0.5-1 hz left lateralized periodic discharge    Events:  Clinical events: None recorded.  Seizures: None recorded.    Activation Procedures:   Hyperventilation was not performed.    Photic stimulation was not performed.     Artifacts:  Intermittent myogenic and movement artifacts were noted.    ECG:  The heart rate on single channel ECG was predominantly between  70-80 BPM.    _____________________________________________________________  EEG SUMMARY/CLASSIFICATION    Abnormal EEG in an altered patient.  - sharply contoured rhythmic delta activity of unclear epileptogenicity with poor evolution  - frequent 0.5-1 hz left lateralized periodic discharge  - Moderate generalized slowing.    _____________________________________________________________  EEG IMPRESSION/CLINICAL CORRELATE    EEG suggestive of area of left frontotempoal cortical irritability with increased risk of seizure generation from this area. EEG suggestive of an area of central and left frontocentral structural or functional abnormality   Moderate nonspecific diffuse or multifocal cerebral dysfunction.   No definitive seizure seen     Roque Youngblood MD PGY-5  Epilepsy Fellow    This Preliminary report is based on fellow review. Final report pending attending review.    Reading Room: [phone]  On Call Service After Hours: [phone]     Starting: Day 1   09:59 on 02/22/2021 to 08:00 on 02/23/2021 Duration 21:@3      AEDs:     lacosamide IVPB 200 milliGRAM(s) IV Q8h  valproate sodium IVPB 750 milliGRAM(s) IV Intermittent every 8 hours        _____________________________________________________________  STUDY INTERPRETATION    Findings: The background was continuous, spontaneously variable and reactive. No posterior dominant rhythm seen.    Background Slowing:  Diffuse theta and polymorphic delta slowing.    Focal Slowing:   None were present.    Sleep Background:  Stage II sleep transients were not recorded.    Other Non-Epileptiform Findings:  None were present.    Interictal Epileptiform Activity:   abundant runs of 5-15 seconds of centrally and left frontally predominant sharply contoured rhythmic delta activity of unclear epileptogenicity. Activity demonstrates clear onset and offset but poor evolution  frequent 0.5-1 hz left lateralized periodic discharge    Events:  Clinical events: None recorded.  Seizures: None recorded.    Activation Procedures:   Hyperventilation was not performed.    Photic stimulation was not performed.     Artifacts:  Intermittent myogenic and movement artifacts were noted.    ECG:  The heart rate on single channel ECG was predominantly between  70-80 BPM.    _____________________________________________________________  EEG SUMMARY/CLASSIFICATION    Abnormal EEG in an altered patient.  - sharply contoured rhythmic delta activity of unclear epileptogenicity with poor evolution  - frequent 0.5-1 hz left lateralized periodic discharge  - Moderate generalized slowing.    _____________________________________________________________  EEG IMPRESSION/CLINICAL CORRELATE    EEG suggestive of area of left frontotempoal cortical irritability with increased risk of seizure generation from this area. EEG suggestive of an area of central and left frontocentral structural or functional abnormality   Moderate nonspecific diffuse or multifocal cerebral dysfunction.   No definitive seizure seen     Roque Youngblood MD PGY-5  Epilepsy Fellow    Gonsalo Rojo MD PhD  Director, Epilepsy Division, [facility] EEG Reading Room Ph#: [phone]  Epilepsy Answering Service after 5PM and before 8:30AM: Ph#: [phone]

## 2021-02-23 NOTE — PROGRESS NOTE ADULT - ASSESSMENT
56yo R-handed lady with a PMH of s who presents to the facility on the afternoon 2/21/21 regarding 3 breakthrough seizures sustained whilst at group home earlier in the day. Patient possesses a past medical history significant for seizure disorder, intellectual disability, cerebral palsy, asthma, nonverbal at baseline, chronic constipation, s/p PEG tube, presents with three seizures over past 1 day. Patient is reportedly compliant with Valproic acid 750mg oral solution q12h , Carbamazepine (100mg qid oral solution) and Vimpat 150mg bid. Seizure activity took the form of BL UE convulsive activity (without incontinence or tongue bite) that lasted roughly 45 seconds and were spaced by roughly one hour. There has been no known head trauma, fever, or vomiting. Patient has been tolerating her tube feeds and has a history of chronic cough. Patient has been prescribed a number of AEDs in the past including, Onfi, Phenytoin, Keppra, Vimpat and has been seen by Dr. Fisher and Dr. Skinner in 2018 and 2019. VPA level here is 36 and Carbamazepine level is <5 suggesting inconsistent medical reconciliation. CT Head noncontrast demonstrates: No acute intracranial pathology. CXR: without emergent findings. Patient admitted to EMU for medication titration. Facility number: [phone]    Impression: Breakthrough seizure 2/2  AED regiment inconsistencies    Plan:  [] AED levels   [] Continue to investigate toxic/metabolic/infectious insults (known recurrent UTI's)  [] VPA 750mg q8h IV  [] Vimpat 150mg bid IV  [] Hold Tegretol for now  [] Continue other pertinent home medications  [] Seizure and Fall precautions  [] Ativan 2mg IV push as rescue  [] daily valproic acid and carbamazepine levels, VPA free level  [x] PEG placement confirmed and feeds started

## 2021-02-24 LAB
ALBUMIN SERPL ELPH-MCNC: 3.1 G/DL — LOW (ref 3.3–5)
ALP SERPL-CCNC: 65 U/L — SIGNIFICANT CHANGE UP (ref 40–120)
ALT FLD-CCNC: 10 U/L — SIGNIFICANT CHANGE UP (ref 10–45)
ANION GAP SERPL CALC-SCNC: 8 MMOL/L — SIGNIFICANT CHANGE UP (ref 5–17)
AST SERPL-CCNC: 17 U/L — SIGNIFICANT CHANGE UP (ref 10–40)
BASOPHILS # BLD AUTO: 0.02 K/UL — SIGNIFICANT CHANGE UP (ref 0–0.2)
BASOPHILS NFR BLD AUTO: 0.4 % — SIGNIFICANT CHANGE UP (ref 0–2)
BILIRUB SERPL-MCNC: 0.1 MG/DL — LOW (ref 0.2–1.2)
BUN SERPL-MCNC: 11 MG/DL — SIGNIFICANT CHANGE UP (ref 7–23)
CALCIUM SERPL-MCNC: 8.7 MG/DL — SIGNIFICANT CHANGE UP (ref 8.4–10.5)
CHLORIDE SERPL-SCNC: 98 MMOL/L — SIGNIFICANT CHANGE UP (ref 96–108)
CO2 SERPL-SCNC: 29 MMOL/L — SIGNIFICANT CHANGE UP (ref 22–31)
CREAT SERPL-MCNC: 0.44 MG/DL — LOW (ref 0.5–1.3)
EOSINOPHIL # BLD AUTO: 0.16 K/UL — SIGNIFICANT CHANGE UP (ref 0–0.5)
EOSINOPHIL NFR BLD AUTO: 2.8 % — SIGNIFICANT CHANGE UP (ref 0–6)
GLUCOSE BLDC GLUCOMTR-MCNC: 100 MG/DL — HIGH (ref 70–99)
GLUCOSE BLDC GLUCOMTR-MCNC: 100 MG/DL — HIGH (ref 70–99)
GLUCOSE BLDC GLUCOMTR-MCNC: 107 MG/DL — HIGH (ref 70–99)
GLUCOSE BLDC GLUCOMTR-MCNC: 73 MG/DL — SIGNIFICANT CHANGE UP (ref 70–99)
GLUCOSE BLDC GLUCOMTR-MCNC: 91 MG/DL — SIGNIFICANT CHANGE UP (ref 70–99)
GLUCOSE BLDC GLUCOMTR-MCNC: 92 MG/DL — SIGNIFICANT CHANGE UP (ref 70–99)
GLUCOSE SERPL-MCNC: 133 MG/DL — HIGH (ref 70–99)
HCT VFR BLD CALC: 37.4 % — SIGNIFICANT CHANGE UP (ref 34.5–45)
HGB BLD-MCNC: 11.7 G/DL — SIGNIFICANT CHANGE UP (ref 11.5–15.5)
IMM GRANULOCYTES NFR BLD AUTO: 0.4 % — SIGNIFICANT CHANGE UP (ref 0–1.5)
LYMPHOCYTES # BLD AUTO: 1.67 K/UL — SIGNIFICANT CHANGE UP (ref 1–3.3)
LYMPHOCYTES # BLD AUTO: 29.5 % — SIGNIFICANT CHANGE UP (ref 13–44)
MCHC RBC-ENTMCNC: 31.3 GM/DL — LOW (ref 32–36)
MCHC RBC-ENTMCNC: 31.4 PG — SIGNIFICANT CHANGE UP (ref 27–34)
MCV RBC AUTO: 100.3 FL — HIGH (ref 80–100)
MONOCYTES # BLD AUTO: 0.86 K/UL — SIGNIFICANT CHANGE UP (ref 0–0.9)
MONOCYTES NFR BLD AUTO: 15.2 % — HIGH (ref 2–14)
NEUTROPHILS # BLD AUTO: 2.94 K/UL — SIGNIFICANT CHANGE UP (ref 1.8–7.4)
NEUTROPHILS NFR BLD AUTO: 51.7 % — SIGNIFICANT CHANGE UP (ref 43–77)
NRBC # BLD: 0 /100 WBCS — SIGNIFICANT CHANGE UP (ref 0–0)
PLATELET # BLD AUTO: 221 K/UL — SIGNIFICANT CHANGE UP (ref 150–400)
POTASSIUM SERPL-MCNC: 4.6 MMOL/L — SIGNIFICANT CHANGE UP (ref 3.5–5.3)
POTASSIUM SERPL-SCNC: 4.6 MMOL/L — SIGNIFICANT CHANGE UP (ref 3.5–5.3)
PROT SERPL-MCNC: 6.8 G/DL — SIGNIFICANT CHANGE UP (ref 6–8.3)
RBC # BLD: 3.73 M/UL — LOW (ref 3.8–5.2)
RBC # FLD: 12.7 % — SIGNIFICANT CHANGE UP (ref 10.3–14.5)
SODIUM SERPL-SCNC: 135 MMOL/L — SIGNIFICANT CHANGE UP (ref 135–145)
WBC # BLD: 5.67 K/UL — SIGNIFICANT CHANGE UP (ref 3.8–10.5)
WBC # FLD AUTO: 5.67 K/UL — SIGNIFICANT CHANGE UP (ref 3.8–10.5)

## 2021-02-24 PROCEDURE — 95720 EEG PHY/QHP EA INCR W/VEEG: CPT

## 2021-02-24 PROCEDURE — 99233 SBSQ HOSP IP/OBS HIGH 50: CPT

## 2021-02-24 RX ORDER — DIVALPROEX SODIUM 500 MG/1
750 TABLET, DELAYED RELEASE ORAL THREE TIMES A DAY
Refills: 0 | Status: DISCONTINUED | OUTPATIENT
Start: 2021-02-24 | End: 2021-02-24

## 2021-02-24 RX ORDER — VALPROIC ACID (AS SODIUM SALT) 250 MG/5ML
750 SOLUTION, ORAL ORAL THREE TIMES A DAY
Refills: 0 | Status: DISCONTINUED | OUTPATIENT
Start: 2021-02-24 | End: 2021-02-25

## 2021-02-24 RX ORDER — LACOSAMIDE 50 MG/1
20 TABLET ORAL
Qty: 1800 | Refills: 0
Start: 2021-02-24 | End: 2021-03-25

## 2021-02-24 RX ADMIN — Medication 0.5 MILLIGRAM(S): at 16:54

## 2021-02-24 RX ADMIN — Medication 750 MILLIGRAM(S): at 21:43

## 2021-02-24 RX ADMIN — LACOSAMIDE 140 MILLIGRAM(S): 50 TABLET ORAL at 04:36

## 2021-02-24 RX ADMIN — LACOSAMIDE 140 MILLIGRAM(S): 50 TABLET ORAL at 14:05

## 2021-02-24 RX ADMIN — Medication 300 MILLIGRAM(S): at 12:00

## 2021-02-24 RX ADMIN — SODIUM CHLORIDE 50 MILLILITER(S): 9 INJECTION, SOLUTION INTRAVENOUS at 21:44

## 2021-02-24 RX ADMIN — Medication 10 MILLILITER(S): at 12:00

## 2021-02-24 RX ADMIN — Medication 750 MILLIGRAM(S): at 13:22

## 2021-02-24 RX ADMIN — Medication 25 MILLIGRAM(S): at 05:58

## 2021-02-24 RX ADMIN — ENOXAPARIN SODIUM 40 MILLIGRAM(S): 100 INJECTION SUBCUTANEOUS at 11:59

## 2021-02-24 RX ADMIN — Medication 0.5 MILLIGRAM(S): at 04:36

## 2021-02-24 RX ADMIN — LACOSAMIDE 140 MILLIGRAM(S): 50 TABLET ORAL at 21:37

## 2021-02-24 NOTE — PROGRESS NOTE ADULT - ASSESSMENT
54yo R-handed lady with a PMH of s who presents to the facility on the afternoon 2/21/21 regarding 3 breakthrough seizures sustained whilst at group home earlier in the day. Patient possesses a past medical history significant for seizure disorder, intellectual disability, cerebral palsy, asthma, nonverbal at baseline, chronic constipation, s/p PEG tube, presents with three seizures over past 1 day. Patient is reportedly compliant with Valproic acid 750mg oral solution q12h , Carbamazepine (100mg qid oral solution) and Vimpat 150mg bid. Seizure activity took the form of BL UE convulsive activity (without incontinence or tongue bite) that lasted roughly 45 seconds and were spaced by roughly one hour. There has been no known head trauma, fever, or vomiting. Patient has been tolerating her tube feeds and has a history of chronic cough. Patient has been prescribed a number of AEDs in the past including, Onfi, Phenytoin, Keppra, Vimpat and has been seen by Dr. Fisher and Dr. Skinner in 2018 and 2019. VPA level here is 36 and Carbamazepine level is <5 suggesting inconsistent medical reconciliation. CT Head noncontrast demonstrates: No acute intracranial pathology. CXR: without emergent findings. Patient admitted to EMU for medication titration. Facility number: [id number]    Impression: Breakthrough seizure 2/2  AED regiment inconsistencies    Plan:  [] Bactrim for UTI  [] VPA 750mg q8h IV  [] Vimpat 200 mg bid IV  [] Hold Tegretol  [] Continue other pertinent home medications  [] Seizure and Fall precautions  [] Ativan 2mg IV push as rescue  [] daily valproic acid and carbamazepine levels, VPA free level  [x] PEG placement confirmed and feeds started   54yo R-handed lady with a PMH of s who presents to the facility on the afternoon 2/21/21 regarding 3 breakthrough seizures sustained whilst at group home earlier in the day. Patient possesses a past medical history significant for seizure disorder, intellectual disability, cerebral palsy, asthma, nonverbal at baseline, chronic constipation, s/p PEG tube, presents with three seizures over past 1 day. Patient is reportedly compliant with Valproic acid 750mg oral solution q12h , Carbamazepine (100mg qid oral solution) and Vimpat 150mg bid. Seizure activity took the form of BL UE convulsive activity (without incontinence or tongue bite) that lasted roughly 45 seconds and were spaced by roughly one hour. There has been no known head trauma, fever, or vomiting. Patient has been tolerating her tube feeds and has a history of chronic cough. Patient has been prescribed a number of AEDs in the past including, Onfi, Phenytoin, Keppra, Vimpat and has been seen by Dr. Fisher and Dr. Skinner in 2018 and 2019. VPA level here is 36 and Carbamazepine level is <5 suggesting inconsistent medical reconciliation. CT Head noncontrast demonstrates: No acute intracranial pathology. CXR: without emergent findings. Patient admitted to EMU for medication titration. Facility number: [id number]    Impression: Breakthrough seizure 2/2  AED regiment inconsistencies    Plan:  [] Bactrim for UTI  [] VPA 750mg q8h IV  [] Vimpat 200 mg TID IV  [] Hold Tegretol  [] Continue other pertinent home medications  [] Seizure and Fall precautions  [] Ativan 2mg IV push as rescue  [] daily valproic acid and carbamazepine levels, VPA free level  [x] PEG placement confirmed and feeds started

## 2021-02-24 NOTE — PROGRESS NOTE ADULT - ATTENDING COMMENTS
multiple electrographic seizures and features of LGS.  plan: load Vimpat 200 and increase maintenance to 200 TID  continue  TID    continue VEEG monitoring
plan as above  levels in am, including free depakote level
EEG improved  plan: switch Depakote to  TID  continue Vimpat 200 TID and plan to switch tomorrow to GT

## 2021-02-24 NOTE — EEG REPORT - NS EEG TEXT BOX
Starting: Day 2  08:00 on 02/23/2021 to 08:00 on 02/24/2021 Duration 24hrs    AEDs:     lacosamide IVPB 200 milliGRAM(s) IV TID  valproic  acid Syrup 750 milliGRAM(s) Oral three times a day      _____________________________________________________________  STUDY INTERPRETATION    Findings: The background was continuous, spontaneously variable and reactive. No posterior dominant rhythm seen.    Background Slowing:  Diffuse theta and polymorphic delta slowing.    Focal Slowing:   None were present.    Sleep Background:  Stage II sleep transients were not recorded.    Other Non-Epileptiform Findings:  None were present.    Interictal Epileptiform Activity:   abundant runs of 5-15 seconds of centrally and left frontally predominant sharply contoured rhythmic delta activity of unclear epileptogenicity. Activity demonstrates clear onset and offset but poor evolution  frequent 0.5-1 hz left lateralized periodic discharge  Intermittent right frontal F4 max sharp wave discharges    Events:  Clinical events: None recorded.  Seizures: None recorded.    Activation Procedures:   Hyperventilation was not performed.    Photic stimulation was not performed.     Artifacts:  Intermittent myogenic and movement artifacts were noted.    ECG:  The heart rate on single channel ECG was predominantly between  70-80 BPM.    _____________________________________________________________  EEG SUMMARY/CLASSIFICATION    Abnormal EEG in an altered patient.  - sharply contoured rhythmic delta activity of unclear epileptogenicity with poor evolution  - frequent 0.5-1 hz left lateralized periodic discharge  - Intermittent right frontal F4 max sharp wave discharges  - Moderate generalized slowing.    _____________________________________________________________  EEG IMPRESSION/CLINICAL CORRELATE    EEG suggestive of area of left frontotempoal cortical irritability with increased risk of seizure generation from this area with an independent area of right frontal irritability. EEG suggestive of an area of central and left frontocentral structural or functional abnormality   Moderate nonspecific diffuse or multifocal cerebral dysfunction.   No definitive seizure seen     Roque Youngblood MD PGY-5  Epilepsy Fellow    This Preliminary report is based on fellow review. Final report pending attending review.    Reading Room: [phone]  On Call Service After Hours: [phone] Starting: Day 2  08:00 on 02/23/2021 to 08:00 on 02/24/2021 Duration 24hrs    AEDs:     lacosamide IVPB 200 milliGRAM(s) IV TID  valproic  acid Syrup 750 milliGRAM(s) Oral three times a day      _____________________________________________________________  STUDY INTERPRETATION    Findings: The background was continuous, spontaneously variable and reactive. No posterior dominant rhythm seen.    Background Slowing:  Diffuse theta and polymorphic delta slowing.    Focal Slowing:   None were present.    Sleep Background:  Stage II sleep transients were not recorded.    Other Non-Epileptiform Findings:  None were present.    Interictal Epileptiform Activity:   abundant runs of 5-15 seconds of centrally and left frontally predominant sharply contoured rhythmic delta activity of unclear epileptogenicity. Activity demonstrates clear onset and offset but poor evolution  frequent 0.5-1 hz left lateralized periodic discharge  Intermittent right frontal F4 max sharp wave discharges    Events:  Clinical events: None recorded.  Seizures: None recorded.    Activation Procedures:   Hyperventilation was not performed.    Photic stimulation was not performed.     Artifacts:  Intermittent myogenic and movement artifacts were noted.    ECG:  The heart rate on single channel ECG was predominantly between  70-80 BPM.    _____________________________________________________________  EEG SUMMARY/CLASSIFICATION    Abnormal EEG in an altered patient.  - sharply contoured rhythmic delta activity of unclear epileptogenicity with poor evolution  - frequent 0.5-1 hz left lateralized periodic discharge  - Intermittent right frontal F4 max sharp wave discharges  - Moderate generalized slowing.    _____________________________________________________________  EEG IMPRESSION/CLINICAL CORRELATE    EEG suggestive of area of left frontotempoal cortical irritability with increased risk of seizure generation from this area with an independent area of right frontal irritability. EEG suggestive of an area of central and left frontocentral structural or functional abnormality   Moderate nonspecific diffuse or multifocal cerebral dysfunction.   No definitive seizure seen     Roque Youngblood MD PGY-5  Epilepsy Fellow    Gonsalo Rojo MD PhD  Director, Epilepsy Division, [facility] EEG Reading Room Ph#: [phone]  Epilepsy Answering Service after 5PM and before 8:30AM: Ph#: [phone]

## 2021-02-25 ENCOUNTER — TRANSCRIPTION ENCOUNTER (OUTPATIENT)
Age: 56
End: 2021-02-25

## 2021-02-25 VITALS
SYSTOLIC BLOOD PRESSURE: 95 MMHG | OXYGEN SATURATION: 97 % | HEART RATE: 81 BPM | RESPIRATION RATE: 18 BRPM | TEMPERATURE: 98 F | DIASTOLIC BLOOD PRESSURE: 61 MMHG

## 2021-02-25 LAB
ANION GAP SERPL CALC-SCNC: 14 MMOL/L — SIGNIFICANT CHANGE UP (ref 5–17)
BUN SERPL-MCNC: 14 MG/DL — SIGNIFICANT CHANGE UP (ref 7–23)
CALCIUM SERPL-MCNC: 9.8 MG/DL — SIGNIFICANT CHANGE UP (ref 8.4–10.5)
CHLORIDE SERPL-SCNC: 97 MMOL/L — SIGNIFICANT CHANGE UP (ref 96–108)
CO2 SERPL-SCNC: 27 MMOL/L — SIGNIFICANT CHANGE UP (ref 22–31)
CREAT SERPL-MCNC: 0.46 MG/DL — LOW (ref 0.5–1.3)
GLUCOSE BLDC GLUCOMTR-MCNC: 79 MG/DL — SIGNIFICANT CHANGE UP (ref 70–99)
GLUCOSE BLDC GLUCOMTR-MCNC: 87 MG/DL — SIGNIFICANT CHANGE UP (ref 70–99)
GLUCOSE BLDC GLUCOMTR-MCNC: 94 MG/DL — SIGNIFICANT CHANGE UP (ref 70–99)
GLUCOSE SERPL-MCNC: 80 MG/DL — SIGNIFICANT CHANGE UP (ref 70–99)
HCT VFR BLD CALC: 42.3 % — SIGNIFICANT CHANGE UP (ref 34.5–45)
HGB BLD-MCNC: 13.7 G/DL — SIGNIFICANT CHANGE UP (ref 11.5–15.5)
MCHC RBC-ENTMCNC: 31.6 PG — SIGNIFICANT CHANGE UP (ref 27–34)
MCHC RBC-ENTMCNC: 32.4 GM/DL — SIGNIFICANT CHANGE UP (ref 32–36)
MCV RBC AUTO: 97.7 FL — SIGNIFICANT CHANGE UP (ref 80–100)
NRBC # BLD: 0 /100 WBCS — SIGNIFICANT CHANGE UP (ref 0–0)
PLATELET # BLD AUTO: 248 K/UL — SIGNIFICANT CHANGE UP (ref 150–400)
POTASSIUM SERPL-MCNC: 4.8 MMOL/L — SIGNIFICANT CHANGE UP (ref 3.5–5.3)
POTASSIUM SERPL-SCNC: 4.8 MMOL/L — SIGNIFICANT CHANGE UP (ref 3.5–5.3)
RBC # BLD: 4.33 M/UL — SIGNIFICANT CHANGE UP (ref 3.8–5.2)
RBC # FLD: 12.4 % — SIGNIFICANT CHANGE UP (ref 10.3–14.5)
SARS-COV-2 RNA SPEC QL NAA+PROBE: SIGNIFICANT CHANGE UP
SODIUM SERPL-SCNC: 138 MMOL/L — SIGNIFICANT CHANGE UP (ref 135–145)
VALPROATE FREE SERPL-MCNC: 23.9 UG/ML — HIGH (ref 6–22)
VALPROATE FREE SERPL-MCNC: 27.3 UG/ML — HIGH (ref 6–22)
VALPROATE SERPL-MCNC: 81 UG/ML — SIGNIFICANT CHANGE UP (ref 50–100)
WBC # BLD: 5.78 K/UL — SIGNIFICANT CHANGE UP (ref 3.8–10.5)
WBC # FLD AUTO: 5.78 K/UL — SIGNIFICANT CHANGE UP (ref 3.8–10.5)

## 2021-02-25 PROCEDURE — 83036 HEMOGLOBIN GLYCOSYLATED A1C: CPT

## 2021-02-25 PROCEDURE — U0005: CPT

## 2021-02-25 PROCEDURE — 71045 X-RAY EXAM CHEST 1 VIEW: CPT

## 2021-02-25 PROCEDURE — 74018 RADEX ABDOMEN 1 VIEW: CPT

## 2021-02-25 PROCEDURE — U0003: CPT

## 2021-02-25 PROCEDURE — 85025 COMPLETE CBC W/AUTO DIFF WBC: CPT

## 2021-02-25 PROCEDURE — 82962 GLUCOSE BLOOD TEST: CPT

## 2021-02-25 PROCEDURE — C9254: CPT

## 2021-02-25 PROCEDURE — 80053 COMPREHEN METABOLIC PANEL: CPT

## 2021-02-25 PROCEDURE — 70450 CT HEAD/BRAIN W/O DYE: CPT

## 2021-02-25 PROCEDURE — 99285 EMERGENCY DEPT VISIT HI MDM: CPT | Mod: 25

## 2021-02-25 PROCEDURE — 99238 HOSP IP/OBS DSCHRG MGMT 30/<: CPT

## 2021-02-25 PROCEDURE — 81001 URINALYSIS AUTO W/SCOPE: CPT

## 2021-02-25 PROCEDURE — 82550 ASSAY OF CK (CPK): CPT

## 2021-02-25 PROCEDURE — 49465 FLUORO EXAM OF G/COLON TUBE: CPT

## 2021-02-25 PROCEDURE — 80165 DIPROPYLACETIC ACID FREE: CPT

## 2021-02-25 PROCEDURE — 80164 ASSAY DIPROPYLACETIC ACD TOT: CPT

## 2021-02-25 PROCEDURE — 80048 BASIC METABOLIC PNL TOTAL CA: CPT

## 2021-02-25 PROCEDURE — 94640 AIRWAY INHALATION TREATMENT: CPT

## 2021-02-25 PROCEDURE — 95715 VEEG EA 12-26HR INTMT MNTR: CPT

## 2021-02-25 PROCEDURE — 85027 COMPLETE CBC AUTOMATED: CPT

## 2021-02-25 PROCEDURE — 95700 EEG CONT REC W/VID EEG TECH: CPT

## 2021-02-25 PROCEDURE — 80156 ASSAY CARBAMAZEPINE TOTAL: CPT

## 2021-02-25 RX ORDER — VALPROIC ACID (AS SODIUM SALT) 250 MG/5ML
15 SOLUTION, ORAL ORAL
Qty: 0 | Refills: 0 | DISCHARGE
Start: 2021-02-25

## 2021-02-25 RX ORDER — VALPROIC ACID (AS SODIUM SALT) 250 MG/5ML
15 SOLUTION, ORAL ORAL
Qty: 1350 | Refills: 0
Start: 2021-02-25

## 2021-02-25 RX ORDER — SENNA PLUS 8.6 MG/1
2 TABLET ORAL
Qty: 0 | Refills: 0 | DISCHARGE
Start: 2021-02-25

## 2021-02-25 RX ORDER — LACOSAMIDE 50 MG/1
200 TABLET ORAL
Refills: 0 | Status: DISCONTINUED | OUTPATIENT
Start: 2021-02-25 | End: 2021-02-25

## 2021-02-25 RX ORDER — MAGNESIUM HYDROXIDE 400 MG/1
30 TABLET, CHEWABLE ORAL DAILY
Refills: 0 | Status: DISCONTINUED | OUTPATIENT
Start: 2021-02-25 | End: 2021-02-25

## 2021-02-25 RX ORDER — NYSTATIN CREAM 100000 [USP'U]/G
1 CREAM TOPICAL
Refills: 0 | Status: DISCONTINUED | OUTPATIENT
Start: 2021-02-25 | End: 2021-02-25

## 2021-02-25 RX ORDER — CALCIUM CARBONATE 500(1250)
1 TABLET ORAL DAILY
Refills: 0 | Status: DISCONTINUED | OUTPATIENT
Start: 2021-02-25 | End: 2021-02-25

## 2021-02-25 RX ORDER — ALBUTEROL 90 UG/1
2.5 AEROSOL, METERED ORAL ONCE
Refills: 0 | Status: DISCONTINUED | OUTPATIENT
Start: 2021-02-25 | End: 2021-02-25

## 2021-02-25 RX ADMIN — ENOXAPARIN SODIUM 40 MILLIGRAM(S): 100 INJECTION SUBCUTANEOUS at 13:16

## 2021-02-25 RX ADMIN — SODIUM CHLORIDE 50 MILLILITER(S): 9 INJECTION, SOLUTION INTRAVENOUS at 06:12

## 2021-02-25 RX ADMIN — LACOSAMIDE 200 MILLIGRAM(S): 50 TABLET ORAL at 13:16

## 2021-02-25 RX ADMIN — Medication 750 MILLIGRAM(S): at 13:16

## 2021-02-25 RX ADMIN — LACOSAMIDE 140 MILLIGRAM(S): 50 TABLET ORAL at 06:11

## 2021-02-25 RX ADMIN — Medication 0.5 MILLIGRAM(S): at 06:12

## 2021-02-25 RX ADMIN — Medication 1 TABLET(S): at 13:16

## 2021-02-25 RX ADMIN — Medication 750 MILLIGRAM(S): at 06:11

## 2021-02-25 RX ADMIN — Medication 300 MILLIGRAM(S): at 13:16

## 2021-02-25 NOTE — DISCHARGE NOTE PROVIDER - CARE PROVIDER_API CALL
Christianne Bonilla)  EEGEpilepsy; Neurology  [address], [address]  [place] [address]  Phone: [phone]  Fax: [phone]  Follow Up Time:

## 2021-02-25 NOTE — DIETITIAN INITIAL EVALUATION ADULT. - ENTERAL
Continue current enteral nutrition regimen: Jevity 1.2 at goal rate 50 ml/hr x 24 hours. Regimen provides 1200 ml total volume, 1440 kcal, 66 g protein, 968 ml water. Provides 20 kcal/kg, 0.9 g/kg protein based on dosing wt 72.6 kg. Meets 96% RDIs. Defer additional free water to team.

## 2021-02-25 NOTE — DISCHARGE NOTE PROVIDER - HOSPITAL COURSE
56yo R-handed lady with a PMH of s who presents to the facility on the afternoon 2/21/21 regarding 3 breakthrough seizures sustained whilst at group home earlier in the day. Patient possesses a past medical history significant for seizure disorder, intellectual disability, cerebral palsy, asthma, nonverbal at baseline, chronic constipation, s/p PEG tube, presents with three seizures over past 1 day. Patient is reportedly compliant with Valproic acid 750mg oral solution q12h , Carbamazepine (100mg qid oral solution) and Vimpat 150mg bid. Seizure activity took the form of BL UE convulsive activity (without incontinence or tongue bite) that lasted roughly 45 seconds and were spaced by roughly one hour. There has been no known head trauma, fever, or vomiting. Patient has been tolerating her tube feeds and has a history of chronic cough. Patient has been prescribed a number of AEDs in the past including, Onfi, Phenytoin, Keppra, Vimpat and has been seen by Dr. Fisher and Dr. Skinner in 2018 and 2019. VPA level here is 36 and Carbamazepine level is <5 suggesting inconsistent medical reconciliation. CT Head noncontrast demonstrates: No acute intracranial pathology. CXR: without emergent findings. Patient admitted to EMU for medication titration.   While in EMU, patient had several electrographic seizure with correlating staring spells. Patient Carbamazepine was discontinued. Patient's Vimpat was increased to 200mg Q8H. Patient achieved seizure freedom and remained clinically stable.   Patient is medically and clinically stable for discharge to Group Home with outpatient follow-up with Neurology.     Impression: Seizure disorder; breakthrough due to medication non-compliance.     Plan:  [x] Stop Carbamazepine   [x] Continue Depakote 750mg Every 8 hours  [x] Increased Vimpat to 200mg Every 8 hours   56yo R-handed lady with a PMH of s who presents to the facility on the afternoon 2/21/21 regarding 3 breakthrough seizures sustained whilst at group home earlier in the day. Patient possesses a past medical history significant for seizure disorder, intellectual disability, cerebral palsy, asthma, nonverbal at baseline, chronic constipation, s/p PEG tube, presents with three seizures over past 1 day. Patient is reportedly compliant with Valproic acid 750mg oral solution q12h , Carbamazepine (100mg qid oral solution) and Vimpat 150mg bid. Seizure activity took the form of BL UE convulsive activity (without incontinence or tongue bite) that lasted roughly 45 seconds and were spaced by roughly one hour. There has been no known head trauma, fever, or vomiting. Patient has been tolerating her tube feeds and has a history of chronic cough. Patient has been prescribed a number of AEDs in the past including, Onfi, Phenytoin, Keppra, Vimpat and has been seen by Dr. Fisher and Dr. Skinner in 2018 and 2019. VPA level here is 36 and Carbamazepine level is <5 suggesting inconsistent medical reconciliation. CT Head noncontrast demonstrates: No acute intracranial pathology. CXR: without emergent findings. Patient admitted to EMU for medication titration.   While in EMU, patient had several electrographic seizure with correlating staring spells. Patient Carbamazepine was discontinued. Patient's Vimpat was increased to 200mg Q8H. Patient achieved seizure freedom and remained clinically stable.   Patient is medically and clinically stable for discharge to Group Home with outpatient follow-up with Neurology.     Impression: Seizure disorder; breakthrough due to medication non-compliance.     Plan:  [x] Stop Carbamazepine   [x] Increased Depakote 750mg Every 8 hours  [x] Increased Vimpat to 200mg Every 8 hours

## 2021-02-25 NOTE — DIETITIAN INITIAL EVALUATION ADULT. - REASON INDICATOR FOR ASSESSMENT
Consult received for enteral nutrition prior to admission. Source: EMR, team. Pt is a 56 yo female with PMH of seizure disorder, intellectual disability, cerebral palsy, asthma, chronic constipation, s/p PEG who presented with 3 seizures x 1 day, admitted 2/21.

## 2021-02-25 NOTE — PROGRESS NOTE ADULT - SUBJECTIVE AND OBJECTIVE BOX
THE PATIENT WAS SEEN AND EXAMINED BY ME WITH THE HOUSESTAFF DURING MORNING ROUNDS.   HPI:  Patient is a 56yo R-handed lady with a PMH of s who presents to the facility on the afternoon 2/21/21 regarding 3 breakthrough seizures sustained whilst at group home earlier in the day. Patient possesses a past medical history significant for seizure disorder, intellectual disability, cerebral palsy, asthma, nonverbal at baseline, chronic constipation, s/p PEG tube, presents with three seizures over past 1 day. Patient is reportedly compliant with Valproic acid 750mg oral solution q12h , Carbamazepine (100mg qid oral solution) and Vimpat 150mg bid. Seizure activity took the form of BL UE convulsive activity (without incontinence or tongue bite) that lasted roughly 45 seconds and were spaced by roughly one hour. There has been no known head trauma, fever, or vomiting. Patient has been tolerating her tube feeds and has a history of chronic cough. Patient has been prescribed a number of AEDs in the past including, Onfi, Phenytoin, Keppra, Vimpat and has been seen by Dr. Fisher and Dr. Skinner in 2018 and 2019. VPA level here is 36 and Carbamazepine level is <5 suggesting inconsistent medical reconciliation. CT Head noncontrast demonstrates: No acute intracranial pathology. CXR: without emergent findings. Patient to be admitted to EMU for medication titration. Facility number: [id number] (21 Feb 2021 20:55)    ROS: Otherwise negative.     SUBJECTIVE: No events overnight.     buDESOnide    Inhalation Suspension 0.5 milliGRAM(s) Inhalation two times a day  dextrose 40% Gel 15 Gram(s) Oral once  dextrose 5%. 1000 milliLiter(s) IV Continuous <Continuous>  dextrose 5%. 1000 milliLiter(s) IV Continuous <Continuous>  dextrose 5%. 1000 milliLiter(s) IV Continuous <Continuous>  dextrose 50% Injectable 25 Gram(s) IV Push once  dextrose 50% Injectable 12.5 Gram(s) IV Push once  dextrose 50% Injectable 25 Gram(s) IV Push once  enoxaparin Injectable 40 milliGRAM(s) SubCutaneous daily  ferrous    sulfate Liquid 300 milliGRAM(s) Enteral Tube daily  glucagon  Injectable 1 milliGRAM(s) IntraMuscular once  insulin lispro (ADMELOG) corrective regimen sliding scale   SubCutaneous every 6 hours  lacosamide Solution 200 milliGRAM(s) Oral <User Schedule>  LORazepam   Injectable 2 milliGRAM(s) IV Push once PRN  senna 2 Tablet(s) Oral at bedtime  trimethoprim  40 mG/sulfamethoxazole 200 mG Suspension 10 milliLiter(s) Enteral Tube daily  valproic  acid Syrup 750 milliGRAM(s) Oral three times a day    Neuro Exam:   Patient not cooperative with exam.   Mental Status: Awake, alert, attentive, not following commands, states name   Cranial Nerves: Unable to assess pupils 2/2 non-cooperation with exam. Eyes spontaneously moving in all directions but with left eye limited abduction.  No facial asymmetry b/l, full eye closure strength b/l.  Unable to assess tongue.  Motor: All extremities moving spontaneously from flexed position both upper and lower extremities.  Normal bulk.  Unable to assess tone as patient w/d each time exam attempted.   Sensation: w/d  w grimace to noxious stimuli x 4 equally.    LABS:                        13.7   5.78  )-----------( 248      ( 25 Feb 2021 04:40 )             42.3    02-25    138  |  97  |  14  ----------------------------<  80  4.8   |  27  |  0.46<L>    Ca    9.8      25 Feb 2021 04:40    TPro  6.8  /  Alb  3.1<L>  /  TBili  0.1<L>  /  DBili  x   /  AST  17  /  ALT  10  /  AlkPhos  65  02-24      COVID-19 PCR: NotDetec (21 Feb 2021 20:48)      IMAGING:     EEG (2/24/21):  EEG suggestive of area of left frontotempoal cortical irritability with increased risk of seizure generation from this area with an independent area of right frontal irritability. EEG suggestive of an area of central and left frontocentral structural or functional abnormality   Moderate nonspecific diffuse or multifocal cerebral dysfunction.  No definitive seizure seen       
SUBJECTIVE: No events overnight    MEDICATIONS (HOME):  Home Medications:  albuterol 2.5 mg/3 mL (0.083%) inhalation solution: 3 milliliter(s) inhaled every 4 hours (24 Oct 2019 19:02)  Artificial Tears ophthalmic solution: 1 drop(s) in each eye 2 times a day (24 Oct 2019 19:02)  aspirin 81 mg oral tablet, chewable: 1 tab(s) by gastrostomy tube once a day (24 Oct 2019 19:02)  B-Complex 50 oral tablet: 1 tab(s) by gastrostomy tube once a day (24 Oct 2019 19:02)  folic acid 1 mg oral tablet: 1 tab(s) by gastrostomy tube once a day (24 Oct 2019 19:02)  lacosamide 10 mg/mL oral solution: 10 milliliter(s) by gastrostomy tube every 8 hours (24 Oct 2019 19:02)  levETIRAcetam 100 mg/mL oral solution: 7.5 milliliter(s) by gastrostomy tube 2 times a day (24 Oct 2019 19:02)  MiraLax oral powder for reconstitution: 17 gram(s) by gastrostomy tube once a day (24 Oct 2019 19:02)  Multiple Vitamins oral liquid: 15 milliliter(s) by gastrostomy tube once a day (24 Oct 2019 19:02)  nystatin 100,000 units/g topical cream: Apply to perineal groin &amp; buttock topically every shift for fungal (24 Oct 2019 19:02)  OLANZapine 7.5 mg oral tablet: 1 tab(s) by gastrostomy tube once a day (24 Oct 2019 19:02)  Senna 8.8 mg/5 mL oral syrup: 10 milliliter(s) by gastrostomy tube once a day (at bedtime) (24 Oct 2019 19:02)  valproic acid 250 mg/5 mL oral liquid: 15 milliliter(s) by gastrostomy tube every 8 hours (24 Oct 2019 19:02)    MEDICATIONS  (STANDING):  buDESOnide    Inhalation Suspension 0.5 milliGRAM(s) Inhalation two times a day  dextrose 40% Gel 15 Gram(s) Oral once  dextrose 5%. 1000 milliLiter(s) (50 mL/Hr) IV Continuous <Continuous>  dextrose 5%. 1000 milliLiter(s) (100 mL/Hr) IV Continuous <Continuous>  dextrose 5%. 1000 milliLiter(s) (50 mL/Hr) IV Continuous <Continuous>  dextrose 50% Injectable 25 Gram(s) IV Push once  dextrose 50% Injectable 12.5 Gram(s) IV Push once  dextrose 50% Injectable 25 Gram(s) IV Push once  enoxaparin Injectable 40 milliGRAM(s) SubCutaneous daily  ferrous    sulfate Liquid 300 milliGRAM(s) Enteral Tube daily  glucagon  Injectable 1 milliGRAM(s) IntraMuscular once  insulin lispro (ADMELOG) corrective regimen sliding scale   SubCutaneous every 6 hours  lacosamide IVPB 200 milliGRAM(s) IV Intermittent <User Schedule>  senna 2 Tablet(s) Oral at bedtime  trimethoprim  40 mG/sulfamethoxazole 200 mG Suspension 10 milliLiter(s) Enteral Tube daily  valproate sodium IVPB 750 milliGRAM(s) IV Intermittent every 8 hours    MEDICATIONS  (PRN):  LORazepam   Injectable 2 milliGRAM(s) IV Push once PRN GTC or seizure activity > 3 min    ALLERGIES/INTOLERANCES:  Allergies  Topamax (Unknown)    Intolerances    VITALS & EXAMINATION:  Vital Signs Last 24 Hrs  T(C): 36.6 (24 Feb 2021 07:56), Max: 36.9 (23 Feb 2021 08:31)  T(F): 97.8 (24 Feb 2021 07:56), Max: 98.4 (23 Feb 2021 08:31)  HR: 76 (24 Feb 2021 07:56) (67 - 97)  BP: 147/71 (24 Feb 2021 07:56) (105/56 - 157/97)  BP(mean): --  RR: 18 (24 Feb 2021 07:56) (18 - 18)  SpO2: 97% (24 Feb 2021 07:56) (95% - 100%)    Neurological Exam:    Neurological Exam:    Patient not cooperative with exam.     Mental Status: Awake, alert, attentive,     Cranial Nerves: Unable to assess pupils 2/2 non-cooperation with exam. Eyes spontaneously moving in all directions but with left eye limited abduction.  No facial asymmetry b/l, full eye closure strength b/l.  Unable to assess tongue.    Motor: All extremities moving spontaneously from flexed position both upper and lower extremities.  Normal bulk.  Unable to assess tone as patient w/d each time exam attempted.     Sensation: w/d  w grimace to noxious stimuli x 4 equally.      LABORATORY:  CBC   Chem       LFTs   Coagulopathy   Lipid Panel   A1c   Cardiac enzymes     U/A 
SUBJECTIVE: No events overnight    MEDICATIONS (HOME):  Home Medications:  albuterol 2.5 mg/3 mL (0.083%) inhalation solution: 3 milliliter(s) inhaled every 4 hours (24 Oct 2019 19:02)  Artificial Tears ophthalmic solution: 1 drop(s) in each eye 2 times a day (24 Oct 2019 19:02)  aspirin 81 mg oral tablet, chewable: 1 tab(s) by gastrostomy tube once a day (24 Oct 2019 19:02)  B-Complex 50 oral tablet: 1 tab(s) by gastrostomy tube once a day (24 Oct 2019 19:02)  folic acid 1 mg oral tablet: 1 tab(s) by gastrostomy tube once a day (24 Oct 2019 19:02)  lacosamide 10 mg/mL oral solution: 10 milliliter(s) by gastrostomy tube every 8 hours (24 Oct 2019 19:02)  levETIRAcetam 100 mg/mL oral solution: 7.5 milliliter(s) by gastrostomy tube 2 times a day (24 Oct 2019 19:02)  MiraLax oral powder for reconstitution: 17 gram(s) by gastrostomy tube once a day (24 Oct 2019 19:02)  Multiple Vitamins oral liquid: 15 milliliter(s) by gastrostomy tube once a day (24 Oct 2019 19:02)  nystatin 100,000 units/g topical cream: Apply to perineal groin &amp; buttock topically every shift for fungal (24 Oct 2019 19:02)  OLANZapine 7.5 mg oral tablet: 1 tab(s) by gastrostomy tube once a day (24 Oct 2019 19:02)  Senna 8.8 mg/5 mL oral syrup: 10 milliliter(s) by gastrostomy tube once a day (at bedtime) (24 Oct 2019 19:02)  valproic acid 250 mg/5 mL oral liquid: 15 milliliter(s) by gastrostomy tube every 8 hours (24 Oct 2019 19:02)    MEDICATIONS  (STANDING):  buDESOnide    Inhalation Suspension 0.5 milliGRAM(s) Inhalation two times a day  dextrose 40% Gel 15 Gram(s) Oral once  dextrose 5%. 1000 milliLiter(s) (50 mL/Hr) IV Continuous <Continuous>  dextrose 5%. 1000 milliLiter(s) (100 mL/Hr) IV Continuous <Continuous>  dextrose 5%. 1000 milliLiter(s) (50 mL/Hr) IV Continuous <Continuous>  dextrose 50% Injectable 25 Gram(s) IV Push once  dextrose 50% Injectable 12.5 Gram(s) IV Push once  dextrose 50% Injectable 25 Gram(s) IV Push once  enoxaparin Injectable 40 milliGRAM(s) SubCutaneous daily  ferrous    sulfate Liquid 300 milliGRAM(s) Enteral Tube daily  glucagon  Injectable 1 milliGRAM(s) IntraMuscular once  insulin lispro (ADMELOG) corrective regimen sliding scale   SubCutaneous every 6 hours  lacosamide IVPB 150 milliGRAM(s) IV Intermittent every 12 hours  senna 2 Tablet(s) Oral at bedtime  trimethoprim  40 mG/sulfamethoxazole 200 mG Suspension 10 milliLiter(s) Enteral Tube daily  valproate sodium IVPB 750 milliGRAM(s) IV Intermittent every 8 hours    MEDICATIONS  (PRN):  LORazepam   Injectable 2 milliGRAM(s) IV Push once PRN GTC or seizure activity > 3 min    ALLERGIES/INTOLERANCES:  Allergies  Topamax (Unknown)    Intolerances    VITALS & EXAMINATION:  Vital Signs Last 24 Hrs  T(C): 36.4 (2021 04:50), Max: 36.9 (2021 16:31)  T(F): 97.5 (2021 04:50), Max: 98.5 (2021 16:31)  HR: 80 (2021 04:50) (80 - 95)  BP: 119/69 (2021 04:50) (103/71 - 148/62)  BP(mean): 77 (2021 18:31) (77 - 77)  RR: 18 (2021 04:50) (16 - 20)  SpO2: 96% (2021 04:50) (95% - 100%)    Neurological Exam:    Patient not cooperative with exam    Mental Status: Awake, alert, attentive, oriented to person    Cranial Nerves: Unable to assess pupils 2/2 non-cooperation with exam. Eyes spontaneously moving in all directions but with left eye limited abduction.  No facial asymmetry b/l, full eye closure strength b/l.  Unable to assess tongue.    Motor: All extremities moving spontaneously.  Normal bulk.  Unable to assess tone as patient w/d each time exam attempted.     Sensation: w/d to noxious stimuli x 4 equally.       LABORATORY:  CBC                       13.3   10.04 )-----------( 273      ( 2021 17:10 )             40.8     Chem -    135  |  95<L>  |  13  ----------------------------<  86  4.4   |  30  |  0.43<L>    Ca    9.1      2021 17:10    TPro  7.8  /  Alb  3.6  /  TBili  0.2  /  DBili  x   /  AST  19  /  ALT  11  /  AlkPhos  74  -    LFTs LIVER FUNCTIONS - ( 2021 17:10 )  Alb: 3.6 g/dL / Pro: 7.8 g/dL / ALK PHOS: 74 U/L / ALT: 11 U/L / AST: 19 U/L / GGT: x           Coagulopathy   Lipid Panel   A1c   Cardiac enzymes CARDIAC MARKERS ( 2021 06:12 )  x     / x     / 47 U/L / x     / x          U/A Urinalysis Basic - ( 2021 06:18 )    Color: Yellow / Appearance: Clear / S.020 / pH: x  Gluc: x / Ketone: Negative  / Bili: Negative / Urobili: Negative   Blood: x / Protein: Trace / Nitrite: Negative   Leuk Esterase: Moderate / RBC: 4 /hpf / WBC 5 /HPF   Sq Epi: x / Non Sq Epi: 1 /hpf / Bacteria: Negative    
SUBJECTIVE: No events overnight    MEDICATIONS (HOME):  Home Medications:  albuterol 2.5 mg/3 mL (0.083%) inhalation solution: 3 milliliter(s) inhaled every 4 hours (24 Oct 2019 19:02)  Artificial Tears ophthalmic solution: 1 drop(s) in each eye 2 times a day (24 Oct 2019 19:02)  aspirin 81 mg oral tablet, chewable: 1 tab(s) by gastrostomy tube once a day (24 Oct 2019 19:02)  B-Complex 50 oral tablet: 1 tab(s) by gastrostomy tube once a day (24 Oct 2019 19:02)  folic acid 1 mg oral tablet: 1 tab(s) by gastrostomy tube once a day (24 Oct 2019 19:02)  lacosamide 10 mg/mL oral solution: 10 milliliter(s) by gastrostomy tube every 8 hours (24 Oct 2019 19:02)  levETIRAcetam 100 mg/mL oral solution: 7.5 milliliter(s) by gastrostomy tube 2 times a day (24 Oct 2019 19:02)  MiraLax oral powder for reconstitution: 17 gram(s) by gastrostomy tube once a day (24 Oct 2019 19:02)  Multiple Vitamins oral liquid: 15 milliliter(s) by gastrostomy tube once a day (24 Oct 2019 19:02)  nystatin 100,000 units/g topical cream: Apply to perineal groin &amp; buttock topically every shift for fungal (24 Oct 2019 19:02)  OLANZapine 7.5 mg oral tablet: 1 tab(s) by gastrostomy tube once a day (24 Oct 2019 19:02)  Senna 8.8 mg/5 mL oral syrup: 10 milliliter(s) by gastrostomy tube once a day (at bedtime) (24 Oct 2019 19:02)  valproic acid 250 mg/5 mL oral liquid: 15 milliliter(s) by gastrostomy tube every 8 hours (24 Oct 2019 19:02)    MEDICATIONS  (STANDING):  buDESOnide    Inhalation Suspension 0.5 milliGRAM(s) Inhalation two times a day  dextrose 40% Gel 15 Gram(s) Oral once  dextrose 5%. 1000 milliLiter(s) (50 mL/Hr) IV Continuous <Continuous>  dextrose 5%. 1000 milliLiter(s) (100 mL/Hr) IV Continuous <Continuous>  dextrose 5%. 1000 milliLiter(s) (50 mL/Hr) IV Continuous <Continuous>  dextrose 50% Injectable 25 Gram(s) IV Push once  dextrose 50% Injectable 12.5 Gram(s) IV Push once  dextrose 50% Injectable 25 Gram(s) IV Push once  enoxaparin Injectable 40 milliGRAM(s) SubCutaneous daily  ferrous    sulfate Liquid 300 milliGRAM(s) Enteral Tube daily  glucagon  Injectable 1 milliGRAM(s) IntraMuscular once  insulin lispro (ADMELOG) corrective regimen sliding scale   SubCutaneous every 6 hours  lacosamide IVPB 150 milliGRAM(s) IV Intermittent every 12 hours  senna 2 Tablet(s) Oral at bedtime  trimethoprim  40 mG/sulfamethoxazole 200 mG Suspension 10 milliLiter(s) Enteral Tube daily  valproate sodium IVPB 750 milliGRAM(s) IV Intermittent every 8 hours    MEDICATIONS  (PRN):  LORazepam   Injectable 2 milliGRAM(s) IV Push once PRN GTC or seizure activity > 3 min    ALLERGIES/INTOLERANCES:  Allergies  Topamax (Unknown)    Intolerances    VITALS & EXAMINATION:  Vital Signs Last 24 Hrs  T(C): 36.8 (2021 04:37), Max: 36.8 (2021 04:37)  T(F): 98.2 (2021 04:37), Max: 98.2 (2021 04:37)  HR: 84 (2021 04:37) (74 - 97)  BP: 127/73 (2021 04:37) (111/67 - 143/78)  BP(mean): --  RR: 18 (2021 04:37) (18 - 20)  SpO2: 98% (2021 04:37) (95% - 98%)    Neurological Exam:    Patient not cooperative with exam    Mental Status: Awake, alert, attentive, oriented to person    Cranial Nerves: Unable to assess pupils 2/2 non-cooperation with exam. Eyes spontaneously moving in all directions but with left eye limited abduction.  No facial asymmetry b/l, full eye closure strength b/l.  Unable to assess tongue.    Motor: All extremities moving spontaneously from flexed position both upper and lower extremities.  Normal bulk.  Unable to assess tone as patient w/d each time exam attempted.     Sensation: w/d  w grimace to noxious stimuli x 4 equally.        LABORATORY:  CBC                       13.3   10.04 )-----------( 273      ( 2021 17:10 )             40.8     Chem -    135  |  95<L>  |  13  ----------------------------<  86  4.4   |  30  |  0.43<L>    Ca    9.1      2021 17:10    TPro  7.8  /  Alb  3.6  /  TBili  0.2  /  DBili  x   /  AST  19  /  ALT  11  /  AlkPhos  74  -    LFTs LIVER FUNCTIONS - ( 2021 17:10 )  Alb: 3.6 g/dL / Pro: 7.8 g/dL / ALK PHOS: 74 U/L / ALT: 11 U/L / AST: 19 U/L / GGT: x           Coagulopathy   Lipid Panel   A1c   Cardiac enzymes CARDIAC MARKERS ( 2021 06:12 )  x     / x     / 47 U/L / x     / x          U/A Urinalysis Basic - ( 2021 06:18 )    Color: Yellow / Appearance: Clear / S.020 / pH: x  Gluc: x / Ketone: Negative  / Bili: Negative / Urobili: Negative   Blood: x / Protein: Trace / Nitrite: Negative   Leuk Esterase: Moderate / RBC: 4 /hpf / WBC 5 /HPF   Sq Epi: x / Non Sq Epi: 1 /hpf / Bacteria: Negative

## 2021-02-25 NOTE — DIETITIAN INITIAL EVALUATION ADULT. - OTHER INFO
Pt visited, noted to be nonverbal at baseline. Greeted pt, however unable to participate in RD interview at this time. Observed tube feeds running at bedside: Jevity 1.2 at 50 ml/hr. Noted pt with fecal incontinence. Last documented BM yesterday (2/24) per flowsheets. Pt noted with difficulty swallowing, s/p PEG PTA. NKFA per chart.    Weight history per [facility] HIE:    Obtained bed scale weight at time of visit: 68.5 kg. Multiple items on bed - ? accuracy of bed scale weight. Will continue to monitor and trend weights. Pt visited, noted to be nonverbal at baseline. Greeted pt, however unable to participate in RD interview at this time. Observed tube feeds running at bedside: Jevity 1.2 at 50 ml/hr. Noted pt with fecal incontinence. Last documented BM yesterday (2/24) per flowsheets. Pt noted with difficulty swallowing, s/p PEG PTA. NKFA per chart. Obtained bed scale weight at time of visit: 68.5 kg. Multiple items on bed - ? accuracy of bed scale weight. Will continue to monitor and trend weights.

## 2021-02-25 NOTE — DIETITIAN INITIAL EVALUATION ADULT. - ORAL INTAKE PTA/DIET HISTORY
Per H&P, pt taking folic acid, B-complex, multivitamin, and senna PTA. Per documentation from facility, pt receiving 6 cans of Jevity 1.2 PTA (1422 ml total). Per H&P, pt tolerating tube feeds PTA. Per H&P, pt taking folic acid, B-complex, multivitamin, and senna PTA.

## 2021-02-25 NOTE — DISCHARGE NOTE NURSING/CASE MANAGEMENT/SOCIAL WORK - PATIENT PORTAL LINK FT
You can access the FollowMyHealth Patient Portal offered by [facility] by registering at the following website: [email or web address]. By joining [employer]’s FollowMyHealth portal, you will also be able to view your health information using other applications (apps) compatible with our system.

## 2021-02-25 NOTE — DISCHARGE NOTE PROVIDER - NSDCCPCAREPLAN_GEN_ALL_CORE_FT
PRINCIPAL DISCHARGE DIAGNOSIS  Diagnosis: Seizure disorder  Assessment and Plan of Treatment: [x] Stop Carbamazepine   [x] Continue Depakote 750mg Every 8 hours  [x] Increased Vimpat to 200mg Every 8 hours       PRINCIPAL DISCHARGE DIAGNOSIS  Diagnosis: Seizure disorder  Assessment and Plan of Treatment: [x] Stop Carbamazepine   [x] Increased Depakote 750mg Every 8 hours  [x] Increased Vimpat to 200mg Every 8 hours

## 2021-02-25 NOTE — PROGRESS NOTE ADULT - ASSESSMENT
Impression: Breakthrough seizure 2/2  AED regiment inconsistencies.    Plan:  [] Bactrim for UTI  [] VPA 750mg q8h changed to PO via PEG tube on 2/24  [] Vimpat 200 mg TID change to PO via PEG tube on 2/25 - PA sent to [facility] on 2/24  [] Hold Tegretol  [] Continue other pertinent home medications  [] Seizure and Fall precautions  [] Ativan 2mg IV push as rescue  [] try for daily valproic acid and carbamazepine levels, VPA free level. as patient is a hard stick  [x] PEG placement confirmed and feeds started  [] Plan to DC back to group home on 2/26

## 2021-02-25 NOTE — EEG REPORT - NS EEG TEXT BOX
Starting: Day 3  08:00 on 02/24/2021 to 08:00 on 02/25/2021 Duration 24hrs    AEDs:     lacosamide Solution 200 milliGRAM(s) Oral TID  valproic  acid Syrup 750 milliGRAM(s) Oral three times a day        _____________________________________________________________  STUDY INTERPRETATION    Findings: The background was continuous, spontaneously variable and reactive. No posterior dominant rhythm seen.    Background Slowing:  Diffuse theta and polymorphic delta slowing.    Focal Slowing:   None were present.    Sleep Background:  Stage II sleep transients were not recorded.    Other Non-Epileptiform Findings:  None were present.    Interictal Epileptiform Activity:   abundant runs of 5-15 seconds of centrally and left frontally predominant sharply contoured rhythmic delta activity of unclear epileptogenicity. Activity demonstrates clear onset and offset but poor evolution  frequent 0.5-1 hz left lateralized periodic discharge  Intermittent right frontal F4 max sharp wave discharges    Events:  Clinical events: None recorded.  Seizures: At 09:25:03 patient was seen to have seizure characterized by central and left frontal predominant rhythmic delta leading to generalized rhythmic delta 2.5 Hz frequency evolving to generalized sharply contoured theta and alpha activity. Following offset  episode was followed by generalized delta with 7 seconds of superimposed 1 hz left lateral periodic discharges    Activation Procedures:   Hyperventilation was not performed.    Photic stimulation was not performed.     Artifacts:  Intermittent myogenic and movement artifacts were noted.    ECG:  The heart rate on single channel ECG was predominantly between  70-80 BPM.    _____________________________________________________________  EEG SUMMARY/CLASSIFICATION    Abnormal EEG in an altered patient.  - central and left frontal onset seizure  - sharply contoured rhythmic delta activity of unclear epileptogenicity with poor evolution  - frequent 0.5-1 hz left lateralized periodic discharge  - Intermittent right frontal F4 max sharp wave discharges  - Moderate generalized slowing.    _____________________________________________________________  EEG IMPRESSION/CLINICAL CORRELATE    In addition to described unambiguous central and left frontal onset seizure, EEG suggestive of area of left frontotempoal cortical irritability with increased risk of seizure generation from this area with an independent area of right frontal irritability. EEG suggestive of an area of central and left frontocentral structural or functional abnormality   Moderate nonspecific diffuse or multifocal cerebral dysfunction.     Roque Youngblood MD PGY-5  Epilepsy Fellow    This Preliminary report is based on fellow review. Final report pending attending review.    Reading Room: [phone]  On Call Service After Hours: [phone] Starting: Day 3  08:00 on 02/24/2021 to 08:00 on 02/25/2021 Duration 24hrs    AEDs:     lacosamide Solution 200 milliGRAM(s) Oral TID  valproic  acid Syrup 750 milliGRAM(s) Oral three times a day        _____________________________________________________________  STUDY INTERPRETATION    Findings: The background was continuous, spontaneously variable and reactive. No posterior dominant rhythm seen.    Background Slowing:  Diffuse theta and polymorphic delta slowing.    Focal Slowing:   None were present.    Sleep Background:  Stage II sleep transients were not recorded.    Other Non-Epileptiform Findings:  None were present.    Interictal Epileptiform Activity:   abundant runs of 5-15 seconds of centrally and left frontally predominant sharply contoured rhythmic delta activity of unclear epileptogenicity. Activity demonstrates clear onset and offset but poor evolution  frequent 0.5-1 hz left lateralized periodic discharge  Intermittent right frontal F4 max sharp wave discharges    Events:  Clinical events: None recorded.  Seizures: At 09:25:03 patient was seen to have seizure characterized by central and left frontal predominant rhythmic delta leading to generalized rhythmic delta 2.5 Hz frequency evolving to generalized sharply contoured theta and alpha activity. Following offset  episode was followed by generalized delta with 7 seconds of superimposed 1 hz left lateral periodic discharges    Activation Procedures:   Hyperventilation was not performed.    Photic stimulation was not performed.     Artifacts:  Intermittent myogenic and movement artifacts were noted.    ECG:  The heart rate on single channel ECG was predominantly between  70-80 BPM.    _____________________________________________________________  EEG SUMMARY/CLASSIFICATION    Abnormal EEG in an altered patient.  - central and left frontal onset seizure  - sharply contoured rhythmic delta activity of unclear epileptogenicity with poor evolution  - frequent 0.5-1 hz left lateralized periodic discharge  - Intermittent right frontal F4 max sharp wave discharges  - Moderate generalized slowing.    _____________________________________________________________  EEG IMPRESSION/CLINICAL CORRELATE    In addition to described unambiguous central and left frontal onset seizure, EEG suggestive of area of left frontotempoal cortical irritability with increased risk of seizure generation from this area with an independent area of right frontal irritability. EEG suggestive of an area of central and left frontocentral structural or functional abnormality   Moderate nonspecific diffuse or multifocal cerebral dysfunction.     Roque Youngblood MD PGY-5  Epilepsy Fellow    Gonsalo Rojo MD PhD  Director, Epilepsy Division, [facility] EEG Reading Room Ph#: [phone]  Epilepsy Answering Service after 5PM and before 8:30AM: Ph#: [phone]

## 2021-02-25 NOTE — DISCHARGE NOTE PROVIDER - NSDCMRMEDTOKEN_GEN_ALL_CORE_FT
albuterol 2.5 mg/3 mL (0.083%) inhalation solution: 3 milliliter(s) inhaled every 4 hours, As Needed  Bactrim: 10 milliliter(s) orally once a day  budesonide 0.5 mg/2 mL inhalation suspension: 2 milliliter(s) inhaled 2 times a day  calcium carbonate 600 mg oral tablet, chewable: 1 tab(s) orally once a day  metroNIDAZOLE 0.75% topical lotion: Apply topically to affected area 2 times a day  Milk of Magnesia: 30 milliliter(s) orally once a day, As Needed  MiraLax oral powder for reconstitution: 17 gram(s) by gastrostomy tube once a day  nystatin 100,000 units/g topical cream: Apply topically to affected area 2 times a day  Robitussin Cold and Cough oral liquid: 10 milliliter(s) orally every 6 hours, As Needed  senna oral tablet: 2 tab(s) orally once a day (at bedtime)  therma-m: 1 tab(s) orally once a day  valproic acid 250 mg/5 mL oral liquid: 15 milliliter(s) orally 3 times a day  Vimpat 10 mg/mL oral solution: 20 milliliter(s) orally 3 times a day MDD:600   albuterol 2.5 mg/3 mL (0.083%) inhalation solution: 3 milliliter(s) inhaled every 4 hours, As Needed  Bactrim: 10 milliliter(s) orally once a day  budesonide 0.5 mg/2 mL inhalation suspension: 2 milliliter(s) inhaled 2 times a day  calcium carbonate 600 mg oral tablet, chewable: 1 tab(s) orally once a day  Lacosadmide increased to 200mg every 8 hours. Carbamazepine stopped. Valproic Acid increased to 750mg every 8 hours.:   metroNIDAZOLE 0.75% topical lotion: Apply topically to affected area 2 times a day  Milk of Magnesia: 30 milliliter(s) orally once a day, As Needed  MiraLax oral powder for reconstitution: 17 gram(s) by gastrostomy tube once a day  nystatin 100,000 units/g topical cream: Apply topically to affected area 2 times a day  Robitussin Cold and Cough oral liquid: 10 milliliter(s) orally every 6 hours, As Needed  senna oral tablet: 2 tab(s) orally once a day (at bedtime)  therma-m: 1 tab(s) orally once a day  valproic acid 250 mg/5 mL oral liquid: 15 milliliter(s) orally 3 times a day  Vimpat 10 mg/mL oral solution: 20 milliliter(s) orally 3 times a day MDD:600

## 2021-03-09 ENCOUNTER — EMERGENCY (EMERGENCY)
Facility: HOSPITAL | Age: 56
LOS: 1 days | Discharge: HOME | End: 2021-03-09
Attending: EMERGENCY MEDICINE
Payer: MEDICARE

## 2021-03-09 VITALS
DIASTOLIC BLOOD PRESSURE: 85 MMHG | SYSTOLIC BLOOD PRESSURE: 119 MMHG | RESPIRATION RATE: 18 BRPM | TEMPERATURE: 98 F | HEART RATE: 83 BPM | OXYGEN SATURATION: 98 %

## 2021-03-09 VITALS
OXYGEN SATURATION: 95 % | HEIGHT: 60 IN | SYSTOLIC BLOOD PRESSURE: 122 MMHG | HEART RATE: 84 BPM | RESPIRATION RATE: 20 BRPM | DIASTOLIC BLOOD PRESSURE: 75 MMHG | WEIGHT: 149.91 LBS

## 2021-03-09 DIAGNOSIS — Z93.1 GASTROSTOMY STATUS: Chronic | ICD-10-CM

## 2021-03-09 PROCEDURE — 99284 EMERGENCY DEPT VISIT MOD MDM: CPT | Mod: 25

## 2021-03-09 PROCEDURE — 43762 RPLC GTUBE NO REVJ TRC: CPT

## 2021-03-09 PROCEDURE — 49465 FLUORO EXAM OF G/COLON TUBE: CPT

## 2021-03-09 PROCEDURE — 99283 EMERGENCY DEPT VISIT LOW MDM: CPT | Mod: 25,GC

## 2021-03-09 PROCEDURE — L8699: CPT

## 2021-03-09 NOTE — ED PROVIDER NOTE - PATIENT PORTAL LINK FT
You can access the FollowMyHealth Patient Portal offered by [facility] by registering at the following website: [email or web address]. By joining [employer]’s FollowMyHealth portal, you will also be able to view your health information using other applications (apps) compatible with our system.

## 2021-03-09 NOTE — ED ADULT NURSE NOTE - ED STAT RN HANDOFF DETAILS 2
Report received from CARON Zambrano in purple. Pt observed AxOx0 at baseline in stretcher. Peg tube replaced. Senior care here for transport back to group home. DC paperwork explained to arsh Rocha at bedside.

## 2021-03-09 NOTE — ED ADULT NURSE NOTE - ED STAT RN HANDOFF DETAILS 3
Report given to group home RN Kyle (phone number obtained from Social work note)  that patient is returned with peg tube cleared for use. RN verbalized understanding and had no further questions.

## 2021-03-09 NOTE — ED ADULT NURSE REASSESSMENT NOTE - NS ED NURSE REASSESS COMMENT FT1
Patient resting in bed comfortably, peg tube flushed without difficulty, +agitation when trying to complete vital signs. No acute distress noted. Will continue to monitor. CARON Elizabeth

## 2021-03-09 NOTE — ED ADULT NURSE NOTE - OBJECTIVE STATEMENT
56 yo female presents to er with dislodged peg tube as per receiving MD Alberts, no RN report received from EMS. Patient nonverbal with aide at bedside, both poor historians, as per community options paperwork, patient with PMHX bipolar disorder, cerebral palsy, intellectual disabilities, encephalopathy. Noted peg tube dislodged. MD Alberts at bedside for replacement. Vital signs stable, respirations even and nonlabored, breathing spontaneously on room air. Abdomen nondistended, Aide at bedside, states patient is at baseline mental status. No obvious signs of discomfort or pain noted. Will continue to monitor. CARON Elizabeth

## 2021-03-09 NOTE — CHART NOTE - NSCHARTNOTEFT_GEN_A_CORE
EMERGENCY : LMSW consulted by resident MD Gamez due to patient being cleared for discharge and need for assistance with discharge planning. MD states that patient came in for a dislodged feeding tube, presenting from group home, with staff member at bedside. Patient unable to communicate and bedbound, with a PMH of asthma, seizure disorder, cerebral palsy and intellectual disability. LMSW reviewed patient's chart. As per chart review patient permanently resides at [facility] located at [address] [place] [address], [facility]. Patient with Medicare Medicaid insurance benefits. Phone number for  on record, Alysha (PH: [phone]), main number for group home (PH: [phone]). LMSW contacted  who states she sent patient to hospital due to her feeding tube being dislodged and her PCP being unavailable today to see her. She is aware that the doctors have cleared patient for discharge and that patient is anticipated to return to group home early this afternoon. She states she will notify staff at the home of patient's return. She requests to speak to the doctor in terms of procedure completed in ED. She also confirms above address for group [place]. LMSW provided MD Gamez with her contact information for follow up. Non-emergent completed by MD. LMSW arranged non-emergent transportation via [facility] to which they routed the ride to [facility] for an 11:30AM pickup. LMSW made staff member at bedside aware who will be riding with patient back to group home in ambulance. MD and RN also aware of pickup time. Patient with no further social work barriers to discharge identified at this time. Contact information provided to group [place] and ongoing social work availability ensured. Social work continues to remain available for any further needs.

## 2021-03-09 NOTE — ED PROVIDER NOTE - CLINICAL SUMMARY MEDICAL DECISION MAKING FREE TEXT BOX
55 year old female with seizure disorder, intellectual disability, cerebral palsy, nonverbal at baseline, chronic constipation, s/p PEG tube BIBEMS for dislodged feeding tube this morning.    Plan: exchange tube and confirm placement w/ gastrograffin study; will discharge back to facility

## 2021-03-09 NOTE — ED PROVIDER NOTE - OBJECTIVE STATEMENT
55 year old female with seizure disorder, intellectual disability, cerebral palsy, nonverbal at baseline, chronic constipation, s/p PEG tube BIBEMS for dislodged feeding tube this morning. Per aide at bedside, patient has been in her normal state of health; no recent sickness, fevers/chills, and has been tolerating feeds well.

## 2021-03-09 NOTE — ED PROVIDER NOTE - PHYSICAL EXAMINATION
Gen: non toxic appearing, NAD  Head: NC/NT  ENT: airway patent, mmm  CV: RRR, +S1/S2  Resp: no wheezing/rhonchi, equal breath sounds  GI:  abdomen soft non-distended, 20F PEG tube placement in place without surrounding erythema or pus; balloon deflated and unable to be reinflated  Neuro: nonverbal; not following commands, moving all four extremities spontaneously

## 2021-03-09 NOTE — ED PROVIDER NOTE - ATTENDING CONTRIBUTION TO CARE
Patient from nursing facility for feeding tube replacement.  Fell out this morning.  Otherwise acting normally, no nausea, vomiting, diarrhea or fevers.  On exam patient with well developed stoma, 20F feeding tube still in place however balloon down and unable to be reinflated.  20F tube exchanged at bedside by myself without difficulty, see separate procedure noted.  Will confirm placement with gastrograffin study and discharge back to facility.

## 2021-03-20 ENCOUNTER — EMERGENCY (EMERGENCY)
Facility: HOSPITAL | Age: 56
LOS: 1 days | Discharge: HOME | End: 2021-03-20
Attending: EMERGENCY MEDICINE
Payer: MEDICARE

## 2021-03-20 VITALS — HEIGHT: 60 IN | TEMPERATURE: 98 F | RESPIRATION RATE: 30 BRPM

## 2021-03-20 DIAGNOSIS — Z93.1 GASTROSTOMY STATUS: Chronic | ICD-10-CM

## 2021-03-20 PROCEDURE — 71045 X-RAY EXAM CHEST 1 VIEW: CPT

## 2021-03-20 PROCEDURE — 99283 EMERGENCY DEPT VISIT LOW MDM: CPT | Mod: GC

## 2021-03-20 PROCEDURE — 99283 EMERGENCY DEPT VISIT LOW MDM: CPT | Mod: 25

## 2021-03-20 NOTE — ED ADULT TRIAGE NOTE - CHIEF COMPLAINT QUOTE
sent from group home for eval of clogged feeding tube, very agitated in triage, scratching staff, noted to be audibly wheezing, placed on O2.  Unable to get full set of VS due to agitation

## 2021-03-21 VITALS
OXYGEN SATURATION: 100 % | TEMPERATURE: 99 F | DIASTOLIC BLOOD PRESSURE: 99 MMHG | RESPIRATION RATE: 22 BRPM | HEART RATE: 98 BPM | SYSTOLIC BLOOD PRESSURE: 133 MMHG

## 2021-03-21 PROCEDURE — 71045 X-RAY EXAM CHEST 1 VIEW: CPT | Mod: 26

## 2021-03-21 NOTE — ED PROVIDER NOTE - OBJECTIVE STATEMENT
55 year old female with seizure disorder, intellectual disability, cerebral palsy, nonverbal at baseline, chronic constipation, s/p PEG tube (Originally placed Sep 2019; replaced earlier this year 2/2 to clogged). Presents with clogged PEG tube as of 4 Pm this afternoon. Pt is accompanied by aide, who reports patient took all her medications today, was attempted to be fed at 4 PM this evening when the tube became clogged. Aide denies any recent fevers, chills, CP, SOB, abdominal pain, nausea, vomiting, diarrhea, bloody stools, dysuric symptoms. Pt has a chronic baseline cough, which they use nebulizers for.

## 2021-03-21 NOTE — ED PROVIDER NOTE - PHYSICAL EXAMINATION
GEN - NAD; non-toxic; non-verbal  HENT - NC/AT, No visible Ecchymosis, No Abrasions, No Lac/Tears, MMM, no discharge  NECK - Neck supple, No LAD, No Swelling  PULM - CTA B/L,  symmetric breath sounds  CV -  S1 S2, 2+ Pulses B/L UE  GI - (+) PEG Site (CDI, without any surrounding cellulitis/abscesses/drainage); NT/ND, soft, no guarding, no rebound, no masses    MSK/EXT- no edema, no gross deformity, warm and well perfused  SKIN - no rash or bruising  NEUROLOGIC - alert, contracted in all 4 ext; non-verbal

## 2021-03-21 NOTE — ED ADULT NURSE REASSESSMENT NOTE - NS ED NURSE REASSESS COMMENT FT1
MD Swenson at bedside flushing peg tube with ginger ale. PEG tube patent and flushing without difficulty.

## 2021-03-21 NOTE — ED PROVIDER NOTE - PATIENT PORTAL LINK FT
You can access the FollowMyHealth Patient Portal offered by [facility] by registering at the following website: [email or web address]. By joining [facility]’s FollowMyHealth portal, you will also be able to view your health information using other applications (apps) compatible with our system.

## 2021-03-21 NOTE — ED ADULT NURSE NOTE - OBJECTIVE STATEMENT
Pt is a 54 y/o female pmhx of Seizure disorder & cerebral palsy presents to the ED from Pt is a 54 y/o female pmhx of Seizure disorder & cerebral palsy presents to the ED from nursing facility complaining of dislodged PEG tube. As per aide at bedside, tube became dislodged this AM. Pt was here 10 days ago for same CC. pt is nonverbal, unable to follow commands, slightly agitated, pt gets aggravated when RN takes vital signs. Breathing spontaneous & nonlabored. Abdomen soft & nondistended, PEG tube appears in place in RUQ, no surrounding redness' or discharge. Pt in electronic wheelchair. Pt unable to provide information.

## 2021-03-21 NOTE — ED PROVIDER NOTE - PROGRESS NOTE DETAILS
Resident Joni: PEG Site is CDI. Tube is clogged with food particulate. Flushed with Ginger Ale without any resistance. PEG Tube is intact, without any breaks. (+) Aspiration of Gastric Contents s/p ginger ale flush. Resident Joni: preliminary evaluation without any evidence of ASA PNA, Not hypoxemic, Tachypneic, Afebrile, VSS.

## 2021-03-21 NOTE — ED ADULT NURSE NOTE - CAS DISCH BELONGINGS RETURNED
Was the patient seen in the last year in this department? Yes     Does patient have an active prescription for medications requested? No     Received Request Via: Pharmacy  
Not applicable

## 2021-03-21 NOTE — ED PROVIDER NOTE - CLINICAL SUMMARY MEDICAL DECISION MAKING FREE TEXT BOX
55 year old female with seizure disorder, intellectual disability, cerebral palsy, nonverbal at baseline, chronic constipation, s/p PEG tube (Originally placed Sep 2019; replaced earlier this year 2/2 to clogged). Presents with clogged PEG tube as of 4 Pm this afternoon. Aide withtou any endorsement of other symptoms. Pt received all medications for today, did not miss any. Exam, presentation, and history consistent with likely food particulate as nidus for PEG clogging. Flushed s/p Ginger Ale. Will obtain CXR, VS, and Pulse Ox to r/o Aspiration PNA given history of chronic cough. VSS, non-toxic.

## 2021-03-21 NOTE — ED PROVIDER NOTE - NSFOLLOWUPINSTRUCTIONS_ED_ALL_ED_FT
You were seen and evaluated in the Emergency Department for your clogged PEG Tube. You were evaluated clinically and with imaging studies.    At this time your clinical evaluation and history do not demonstrate any acute, life-threatening medical conditions warranting emergent treatment. However, we strongly recommend you follow up with one of our GI consultants (or your own) for further evaluation of your symptoms by calling the following number to make an appointment:    [facility]  Gastroenterology  [address] [place] [address], [address]  [place] [address]  Phone: [phone]    Should you develop new or worsening abdominal pain, fevers, chills, nausea, vomiting, diarrhea, or constipation - please return to the ED for immediate evaluation.     We also strongly encourage you make an appointment with your Primary Care Physician for a comprehensive evaluation of your health.

## 2021-04-15 ENCOUNTER — EMERGENCY (EMERGENCY)
Facility: HOSPITAL | Age: 56
LOS: 1 days | Discharge: NURSING, REHAB OR LONG-TERM CARE | End: 2021-04-15
Attending: EMERGENCY MEDICINE
Payer: MEDICARE

## 2021-04-15 VITALS — WEIGHT: 169.98 LBS | HEIGHT: 60 IN

## 2021-04-15 DIAGNOSIS — Z93.1 GASTROSTOMY STATUS: Chronic | ICD-10-CM

## 2021-04-15 LAB
ALBUMIN SERPL ELPH-MCNC: 3.5 G/DL — SIGNIFICANT CHANGE UP (ref 3.3–5)
ALP SERPL-CCNC: 76 U/L — SIGNIFICANT CHANGE UP (ref 40–120)
ALT FLD-CCNC: 15 U/L — SIGNIFICANT CHANGE UP (ref 10–45)
ANION GAP SERPL CALC-SCNC: 11 MMOL/L — SIGNIFICANT CHANGE UP (ref 5–17)
APTT BLD: 24.6 SEC — LOW (ref 27.5–35.5)
AST SERPL-CCNC: 70 U/L — HIGH (ref 10–40)
BASOPHILS # BLD AUTO: 0.03 K/UL — SIGNIFICANT CHANGE UP (ref 0–0.2)
BASOPHILS NFR BLD AUTO: 0.4 % — SIGNIFICANT CHANGE UP (ref 0–2)
BILIRUB SERPL-MCNC: 0.2 MG/DL — SIGNIFICANT CHANGE UP (ref 0.2–1.2)
BUN SERPL-MCNC: 20 MG/DL — SIGNIFICANT CHANGE UP (ref 7–23)
CALCIUM SERPL-MCNC: 9.1 MG/DL — SIGNIFICANT CHANGE UP (ref 8.4–10.5)
CHLORIDE SERPL-SCNC: 96 MMOL/L — SIGNIFICANT CHANGE UP (ref 96–108)
CO2 SERPL-SCNC: 29 MMOL/L — SIGNIFICANT CHANGE UP (ref 22–31)
CREAT SERPL-MCNC: 0.55 MG/DL — SIGNIFICANT CHANGE UP (ref 0.5–1.3)
EOSINOPHIL # BLD AUTO: 0.19 K/UL — SIGNIFICANT CHANGE UP (ref 0–0.5)
EOSINOPHIL NFR BLD AUTO: 2.4 % — SIGNIFICANT CHANGE UP (ref 0–6)
GAS PNL BLDV: SIGNIFICANT CHANGE UP
GLUCOSE SERPL-MCNC: 71 MG/DL — SIGNIFICANT CHANGE UP (ref 70–99)
HCT VFR BLD CALC: 41.7 % — SIGNIFICANT CHANGE UP (ref 34.5–45)
HGB BLD-MCNC: 13.6 G/DL — SIGNIFICANT CHANGE UP (ref 11.5–15.5)
IMM GRANULOCYTES NFR BLD AUTO: 0.9 % — SIGNIFICANT CHANGE UP (ref 0–1.5)
INR BLD: 0.92 RATIO — SIGNIFICANT CHANGE UP (ref 0.88–1.16)
LYMPHOCYTES # BLD AUTO: 2.36 K/UL — SIGNIFICANT CHANGE UP (ref 1–3.3)
LYMPHOCYTES # BLD AUTO: 30 % — SIGNIFICANT CHANGE UP (ref 13–44)
MAGNESIUM SERPL-MCNC: 2.1 MG/DL — SIGNIFICANT CHANGE UP (ref 1.6–2.6)
MCHC RBC-ENTMCNC: 32.6 GM/DL — SIGNIFICANT CHANGE UP (ref 32–36)
MCHC RBC-ENTMCNC: 33.3 PG — SIGNIFICANT CHANGE UP (ref 27–34)
MCV RBC AUTO: 102.2 FL — HIGH (ref 80–100)
MONOCYTES # BLD AUTO: 1.25 K/UL — HIGH (ref 0–0.9)
MONOCYTES NFR BLD AUTO: 15.9 % — HIGH (ref 2–14)
NEUTROPHILS # BLD AUTO: 3.96 K/UL — SIGNIFICANT CHANGE UP (ref 1.8–7.4)
NEUTROPHILS NFR BLD AUTO: 50.4 % — SIGNIFICANT CHANGE UP (ref 43–77)
NRBC # BLD: 0 /100 WBCS — SIGNIFICANT CHANGE UP (ref 0–0)
PHOSPHATE SERPL-MCNC: 4 MG/DL — SIGNIFICANT CHANGE UP (ref 2.5–4.5)
PLATELET # BLD AUTO: 197 K/UL — SIGNIFICANT CHANGE UP (ref 150–400)
POTASSIUM SERPL-MCNC: 5.8 MMOL/L — HIGH (ref 3.5–5.3)
POTASSIUM SERPL-SCNC: 5.8 MMOL/L — HIGH (ref 3.5–5.3)
PROT SERPL-MCNC: 7.8 G/DL — SIGNIFICANT CHANGE UP (ref 6–8.3)
PROTHROM AB SERPL-ACNC: 11.1 SEC — SIGNIFICANT CHANGE UP (ref 10.6–13.6)
RBC # BLD: 4.08 M/UL — SIGNIFICANT CHANGE UP (ref 3.8–5.2)
RBC # FLD: 16.1 % — HIGH (ref 10.3–14.5)
SODIUM SERPL-SCNC: 136 MMOL/L — SIGNIFICANT CHANGE UP (ref 135–145)
WBC # BLD: 7.86 K/UL — SIGNIFICANT CHANGE UP (ref 3.8–10.5)
WBC # FLD AUTO: 7.86 K/UL — SIGNIFICANT CHANGE UP (ref 3.8–10.5)

## 2021-04-15 PROCEDURE — 99285 EMERGENCY DEPT VISIT HI MDM: CPT | Mod: CS,25,GC

## 2021-04-15 PROCEDURE — 36000 PLACE NEEDLE IN VEIN: CPT | Mod: GC

## 2021-04-15 PROCEDURE — 71045 X-RAY EXAM CHEST 1 VIEW: CPT | Mod: 26

## 2021-04-15 PROCEDURE — 93010 ELECTROCARDIOGRAM REPORT: CPT | Mod: 59,GC

## 2021-04-15 RX ORDER — HALOPERIDOL DECANOATE 100 MG/ML
2 INJECTION INTRAMUSCULAR ONCE
Refills: 0 | Status: COMPLETED | OUTPATIENT
Start: 2021-04-15 | End: 2021-04-15

## 2021-04-15 RX ADMIN — HALOPERIDOL DECANOATE 2 MILLIGRAM(S): 100 INJECTION INTRAMUSCULAR at 23:26

## 2021-04-15 NOTE — ED PROVIDER NOTE - CLINICAL SUMMARY MEDICAL DECISION MAKING FREE TEXT BOX
Pt not hypoxic on RA in ED (?reliable O2 sat from EMS). Consider pneumonia/aspiration pneumonia, covid. Check CBC eval for anemia, cmp eval for metabolic derangement. Mag. Phos. CXR. Re-eval. - Aamir Doran MD

## 2021-04-15 NOTE — ED PROVIDER NOTE - NSFOLLOWUPINSTRUCTIONS_ED_ALL_ED_FT
1. You were seen in the Emergency Room for coughing, drooling and possible low oxygen saturation. Your oxygen was normal in the ER without extra oxygen. The workup today did not reveal an emergent cause for your symptoms  2. Please continue to take all medications as prescribed.   3. Please follow up with your doctor in 24-48 hours.  4. Return to the emergency room or seek immediate assistance for any new or concerning symptoms (such as fevers, chills, worsening drooling, coughing, inability to swallow food or secretions, chest pain, difficulty breathing, change in mental status or neurologic status (new changes in strength or sensation)), or if you get worse.   5. Copies of your tests were provided to you for follow-up.  You must address all your findings with your doctor.

## 2021-04-15 NOTE — ED PROVIDER NOTE - CONSTITUTIONAL, MLM
Well appearing, awake, alert, oriented to person, place, time/situation and in no apparent distress. normal... Awake, alert

## 2021-04-15 NOTE — ED PROVIDER NOTE - OBJECTIVE STATEMENT
55 year old female with seizure disorder, intellectual disability, cerebral palsy, nonverbal at baseline, chronic constipation, s/p PEG tube, presents with cough, drooling, s/p 1 episode of vomiting. Pt lives in group home. +COVID exposure a few days ago, fellow group home resident. No recorded fever at facility. No diarrhea. No other significant changes.    Per EMS, pt hypoxic to 70s on RA on arrival. Improved with NRB.    History obtained from nurse at facility.

## 2021-04-15 NOTE — ED ADULT NURSE NOTE - CCCP TRG CHIEF CMPLNT
ANTICOAGULATION FOLLOW-UP CLINIC VISIT    Patient Name:  Michael Martinez  Date:  9/12/2018  Contact Type:  Face to Face    SUBJECTIVE:        OBJECTIVE    INR Protime   Date Value Ref Range Status   09/12/2018 2.6 (A) 0.86 - 1.14 Final       ASSESSMENT / PLAN  INR assessment THER    Recheck INR In: 3 WEEKS    INR Location Clinic      Anticoagulation Summary as of 9/12/2018     INR goal 2.0-3.0   Today's INR 2.6   Warfarin maintenance plan 5 mg (5 mg x 1) on Mon, Fri; 7.5 mg (7.5 mg x 1) all other days   Full warfarin instructions 5 mg on Mon, Fri; 7.5 mg all other days   Weekly warfarin total 47.5 mg   No change documented Radha Schwab, RN   Plan last modified Maria Del Rosario Trujillo RN (8/29/2018)   Next INR check 10/3/2018   Target end date Indefinite    Indications   Long-term (current) use of anticoagulants [Z79.01] [Z79.01]  Anticoagulation monitoring  INR range 2-3 [Z79.01]  History of pulmonary embolism [Z86.711]  Personal history of DVT (deep vein thrombosis) [Z86.718]         Anticoagulation Episode Summary     INR check location     Preferred lab     Send INR reminders to  INR    Comments       Anticoagulation Care Providers     Provider Role Specialty Phone number    Dc Vargas MD [facility] [phone]            See the Encounter Report to view Anticoagulation Flowsheet and Dosing Calendar (Go to Encounters tab in chart review, and find the Anticoagulation Therapy Visit)        Radha Schwab, RN                shortness of breath

## 2021-04-15 NOTE — ED ADULT NURSE REASSESSMENT NOTE - NS ED NURSE REASSESS COMMENT FT1
Break report received from primary nurse ANNETTE Snadhu RN. Pt observed resting in stretcher. Pending CT scan at this time.  Pt placed in position of comfort. Pt educated on call bell system and provided call bell. Bed in lowest position, wheels locked, appropriate side rails raised.

## 2021-04-15 NOTE — ED PROVIDER NOTE - PATIENT PORTAL LINK FT
You can access the FollowMyHealth Patient Portal offered by [facility] by registering at the following website: [email or web address]. By joining [employer]’s FollowMyHealth portal, you will also be able to view your health information using other applications (apps) compatible with our system.

## 2021-04-15 NOTE — ED PROVIDER NOTE - PROGRESS NOTE DETAILS
CXR unremarkable. Will get CT chest to eval for pneumonia. - Aamir Doran MD Amy Epps MD PGY-3  patient signed out to me by prior team, pending CT chest and COVID result. If all negative, patient can likely be discharged back to facility. Currently comfortable appearing with % RA Amy Epps MD PGY-3 all imaging and labs discussed with Leena (nurse) [phone] at [facility]. workup without acute pathology. still 98% on RA. Leena aware that patient will be returning via [facility], address listed on nonemergent form

## 2021-04-15 NOTE — ED ADULT NURSE REASSESSMENT NOTE - NS ED NURSE REASSESS COMMENT FT1
CT transport at bedside. Transport team states that patient appears that she will not tolerate laying still for CT scan. ED Attending CHANELL Doran at bedside and to assess what to provide patient to be able to tolerate CT scan. Pt placed in position of comfort. Pt educated on call bell system and provided call bell. Bed in lowest position, wheels locked, appropriate side rails raised. Pt denies needs at this time.

## 2021-04-15 NOTE — ED ADULT TRIAGE NOTE - CHIEF COMPLAINT QUOTE
from group home, patient would found to be drooling and coughing  hypoxia, sat 70's on RA,. placed on NRB, unable to obtain VS in triage, patient non-verbal at baseline

## 2021-04-15 NOTE — ED ADULT NURSE NOTE - OBJECTIVE STATEMENT
Patient is a 55 year old female brought in by ems for increased drooling and cough. as per print out from facility Patient has history of bipolar disorder, intellectual disabilities, seizures, cerebral palsy, dysphagia. Patient is non verbal and appears restless. as per ems pt has increase drooling and cough, and 1 episode of vomiting. as per ems a person at facility tested + covid. pt has gtube in place and brace around abdomen. pt has b/l coarse lung sounds. Safety and comfort maintained. Will continue to monitor.

## 2021-04-16 VITALS
HEART RATE: 74 BPM | DIASTOLIC BLOOD PRESSURE: 58 MMHG | RESPIRATION RATE: 19 BRPM | OXYGEN SATURATION: 100 % | SYSTOLIC BLOOD PRESSURE: 122 MMHG

## 2021-04-16 LAB
ANION GAP SERPL CALC-SCNC: 12 MMOL/L — SIGNIFICANT CHANGE UP (ref 5–17)
APPEARANCE UR: CLEAR — SIGNIFICANT CHANGE UP
BACTERIA # UR AUTO: NEGATIVE — SIGNIFICANT CHANGE UP
BASE EXCESS BLDV CALC-SCNC: 6.5 MMOL/L — HIGH (ref -2–2)
BILIRUB UR-MCNC: NEGATIVE — SIGNIFICANT CHANGE UP
BUN SERPL-MCNC: 21 MG/DL — SIGNIFICANT CHANGE UP (ref 7–23)
CA-I SERPL-SCNC: 1.18 MMOL/L — SIGNIFICANT CHANGE UP (ref 1.12–1.3)
CALCIUM SERPL-MCNC: 9.1 MG/DL — SIGNIFICANT CHANGE UP (ref 8.4–10.5)
CHLORIDE BLDV-SCNC: 101 MMOL/L — SIGNIFICANT CHANGE UP (ref 96–108)
CHLORIDE SERPL-SCNC: 98 MMOL/L — SIGNIFICANT CHANGE UP (ref 96–108)
CO2 BLDV-SCNC: 35 MMOL/L — HIGH (ref 22–30)
CO2 SERPL-SCNC: 29 MMOL/L — SIGNIFICANT CHANGE UP (ref 22–31)
COLOR SPEC: YELLOW — SIGNIFICANT CHANGE UP
CREAT SERPL-MCNC: 0.56 MG/DL — SIGNIFICANT CHANGE UP (ref 0.5–1.3)
DIFF PNL FLD: NEGATIVE — SIGNIFICANT CHANGE UP
EPI CELLS # UR: 1 /HPF — SIGNIFICANT CHANGE UP
GAS PNL BLDV: 134 MMOL/L — LOW (ref 135–145)
GAS PNL BLDV: SIGNIFICANT CHANGE UP
GLUCOSE BLDV-MCNC: 80 MG/DL — SIGNIFICANT CHANGE UP (ref 70–99)
GLUCOSE SERPL-MCNC: 77 MG/DL — SIGNIFICANT CHANGE UP (ref 70–99)
GLUCOSE UR QL: NEGATIVE — SIGNIFICANT CHANGE UP
HCO3 BLDV-SCNC: 33 MMOL/L — HIGH (ref 21–29)
HCT VFR BLDA CALC: 41 % — SIGNIFICANT CHANGE UP (ref 39–50)
HGB BLD CALC-MCNC: 13.4 G/DL — SIGNIFICANT CHANGE UP (ref 11.5–15.5)
HYALINE CASTS # UR AUTO: 0 /LPF — SIGNIFICANT CHANGE UP (ref 0–2)
KETONES UR-MCNC: NEGATIVE — SIGNIFICANT CHANGE UP
LACTATE BLDV-MCNC: 1.2 MMOL/L — SIGNIFICANT CHANGE UP (ref 0.7–2)
LEUKOCYTE ESTERASE UR-ACNC: NEGATIVE — SIGNIFICANT CHANGE UP
NITRITE UR-MCNC: NEGATIVE — SIGNIFICANT CHANGE UP
PCO2 BLDV: 59 MMHG — HIGH (ref 39–42)
PH BLDV: 7.37 — SIGNIFICANT CHANGE UP (ref 7.35–7.45)
PH UR: 7.5 — SIGNIFICANT CHANGE UP (ref 5–8)
PO2 BLDV: 39 MMHG — SIGNIFICANT CHANGE UP (ref 25–45)
POTASSIUM BLDV-SCNC: 4.3 MMOL/L — SIGNIFICANT CHANGE UP (ref 3.5–5.3)
POTASSIUM SERPL-MCNC: 4.4 MMOL/L — SIGNIFICANT CHANGE UP (ref 3.5–5.3)
POTASSIUM SERPL-SCNC: 4.4 MMOL/L — SIGNIFICANT CHANGE UP (ref 3.5–5.3)
PROT UR-MCNC: NEGATIVE — SIGNIFICANT CHANGE UP
RBC CASTS # UR COMP ASSIST: 2 /HPF — SIGNIFICANT CHANGE UP (ref 0–4)
SAO2 % BLDV: 69 % — SIGNIFICANT CHANGE UP (ref 67–88)
SARS-COV-2 RNA SPEC QL NAA+PROBE: SIGNIFICANT CHANGE UP
SODIUM SERPL-SCNC: 139 MMOL/L — SIGNIFICANT CHANGE UP (ref 135–145)
SP GR SPEC: 1.02 — SIGNIFICANT CHANGE UP (ref 1.01–1.02)
UROBILINOGEN FLD QL: NEGATIVE — SIGNIFICANT CHANGE UP
WBC UR QL: 1 /HPF — SIGNIFICANT CHANGE UP (ref 0–5)

## 2021-04-16 PROCEDURE — 85730 THROMBOPLASTIN TIME PARTIAL: CPT

## 2021-04-16 PROCEDURE — 80048 BASIC METABOLIC PNL TOTAL CA: CPT

## 2021-04-16 PROCEDURE — 83605 ASSAY OF LACTIC ACID: CPT

## 2021-04-16 PROCEDURE — 82565 ASSAY OF CREATININE: CPT

## 2021-04-16 PROCEDURE — 85018 HEMOGLOBIN: CPT

## 2021-04-16 PROCEDURE — 99284 EMERGENCY DEPT VISIT MOD MDM: CPT | Mod: 25

## 2021-04-16 PROCEDURE — 85014 HEMATOCRIT: CPT

## 2021-04-16 PROCEDURE — 87086 URINE CULTURE/COLONY COUNT: CPT

## 2021-04-16 PROCEDURE — G1004: CPT

## 2021-04-16 PROCEDURE — 93005 ELECTROCARDIOGRAM TRACING: CPT

## 2021-04-16 PROCEDURE — 82803 BLOOD GASES ANY COMBINATION: CPT

## 2021-04-16 PROCEDURE — 71045 X-RAY EXAM CHEST 1 VIEW: CPT

## 2021-04-16 PROCEDURE — U0003: CPT

## 2021-04-16 PROCEDURE — 80053 COMPREHEN METABOLIC PANEL: CPT

## 2021-04-16 PROCEDURE — 85610 PROTHROMBIN TIME: CPT

## 2021-04-16 PROCEDURE — 84132 ASSAY OF SERUM POTASSIUM: CPT

## 2021-04-16 PROCEDURE — 81001 URINALYSIS AUTO W/SCOPE: CPT

## 2021-04-16 PROCEDURE — 85025 COMPLETE CBC W/AUTO DIFF WBC: CPT

## 2021-04-16 PROCEDURE — 82947 ASSAY GLUCOSE BLOOD QUANT: CPT

## 2021-04-16 PROCEDURE — 96372 THER/PROPH/DIAG INJ SC/IM: CPT

## 2021-04-16 PROCEDURE — 71250 CT THORAX DX C-: CPT

## 2021-04-16 PROCEDURE — 82330 ASSAY OF CALCIUM: CPT

## 2021-04-16 PROCEDURE — 82435 ASSAY OF BLOOD CHLORIDE: CPT

## 2021-04-16 PROCEDURE — 83735 ASSAY OF MAGNESIUM: CPT

## 2021-04-16 PROCEDURE — 84100 ASSAY OF PHOSPHORUS: CPT

## 2021-04-16 PROCEDURE — 84295 ASSAY OF SERUM SODIUM: CPT

## 2021-04-16 PROCEDURE — U0005: CPT

## 2021-04-16 PROCEDURE — 71250 CT THORAX DX C-: CPT | Mod: 26,MG

## 2021-04-16 NOTE — ED ADULT NURSE REASSESSMENT NOTE - NS ED NURSE REASSESS COMMENT FT1
Patient straight cathed using sterile technique. Second RN present to confirm sterility. Patient tolerated procedure well. Output of approx. 100cc's urine. Sterile specimens collected and sent to lab. Will cont to monitor.

## 2021-04-16 NOTE — ED PROCEDURE NOTE - PROCEDURE ADDITIONAL DETAILS
20 G catheter placed in a peripheral vein in the left arm. Catheter visualized in vein lumen, catheter flushes without resistance. Saline noted in vein on saline flush test

## 2021-04-17 LAB
CULTURE RESULTS: SIGNIFICANT CHANGE UP
SPECIMEN SOURCE: SIGNIFICANT CHANGE UP

## 2021-04-29 NOTE — PATIENT PROFILE ADULT - FLU SEASON?
Brief Postoperative Note    Patient: Lae Moran  YOB: 1956  MRN: [id number]    Date of Procedure: 4/29/2021     Pre-Op Diagnosis: CAROTID STENOSIS    Post-Op Diagnosis: Same as preoperative diagnosis. Procedure(s):  RIGHT CAROTID ENDARTERECTOMY    Surgeon(s): Nicolette Ortiz MD    Surgical Assistant: Surg Asst-1: Otilia Klinefelter I    Anesthesia: General     Estimated Blood Loss (mL): less than 801     Complications: None    Specimens: * No specimens in log *     Implants:   Implant Name Type Inv. Item Serial No.  Lot No. LRB No. Used Action   GRAFT VASC W0.3XL3IN THK0. 36IN CLLGN ULT THN KNIT [facility] - [id number]  GRAFT VASC W0.3XL3IN THK0. 36IN CLLGN ULT N KNIT [facility] [id number] [employer] [place] [employer]_WD N/A Right 1 Implanted       Drains:   [employer]-Davis Drain 04/29/21 Right Neck (Active)       Findings: 0    Electronically Signed by Farzaneh Pedersen MD on 4/29/2021 at 2:16 PM
Yes...

## 2021-05-17 ENCOUNTER — EMERGENCY (EMERGENCY)
Facility: HOSPITAL | Age: 56
LOS: 1 days | Discharge: HOME | End: 2021-05-17
Attending: EMERGENCY MEDICINE
Payer: MEDICARE

## 2021-05-17 VITALS
RESPIRATION RATE: 18 BRPM | SYSTOLIC BLOOD PRESSURE: 110 MMHG | DIASTOLIC BLOOD PRESSURE: 60 MMHG | HEART RATE: 69 BPM | TEMPERATURE: 97 F | OXYGEN SATURATION: 98 %

## 2021-05-17 VITALS
HEIGHT: 60 IN | HEART RATE: 90 BPM | OXYGEN SATURATION: 97 % | TEMPERATURE: 98 F | DIASTOLIC BLOOD PRESSURE: 60 MMHG | WEIGHT: 154.1 LBS | SYSTOLIC BLOOD PRESSURE: 98 MMHG | RESPIRATION RATE: 18 BRPM

## 2021-05-17 DIAGNOSIS — Z93.1 GASTROSTOMY STATUS: Chronic | ICD-10-CM

## 2021-05-17 LAB
ALBUMIN SERPL ELPH-MCNC: 3.5 G/DL — SIGNIFICANT CHANGE UP (ref 3.3–5)
ALP SERPL-CCNC: 71 U/L — SIGNIFICANT CHANGE UP (ref 40–120)
ALT FLD-CCNC: 23 U/L — SIGNIFICANT CHANGE UP (ref 10–45)
ANION GAP SERPL CALC-SCNC: 11 MMOL/L — SIGNIFICANT CHANGE UP (ref 5–17)
ANION GAP SERPL CALC-SCNC: 12 MMOL/L — SIGNIFICANT CHANGE UP (ref 5–17)
APTT BLD: 25.3 SEC — LOW (ref 27.5–35.5)
AST SERPL-CCNC: 73 U/L — HIGH (ref 10–40)
BASOPHILS # BLD AUTO: 0.02 K/UL — SIGNIFICANT CHANGE UP (ref 0–0.2)
BASOPHILS NFR BLD AUTO: 0.3 % — SIGNIFICANT CHANGE UP (ref 0–2)
BILIRUB SERPL-MCNC: 0.2 MG/DL — SIGNIFICANT CHANGE UP (ref 0.2–1.2)
BUN SERPL-MCNC: 17 MG/DL — SIGNIFICANT CHANGE UP (ref 7–23)
BUN SERPL-MCNC: 18 MG/DL — SIGNIFICANT CHANGE UP (ref 7–23)
CALCIUM SERPL-MCNC: 9.3 MG/DL — SIGNIFICANT CHANGE UP (ref 8.4–10.5)
CALCIUM SERPL-MCNC: 9.3 MG/DL — SIGNIFICANT CHANGE UP (ref 8.4–10.5)
CHLORIDE SERPL-SCNC: 93 MMOL/L — LOW (ref 96–108)
CHLORIDE SERPL-SCNC: 94 MMOL/L — LOW (ref 96–108)
CO2 SERPL-SCNC: 28 MMOL/L — SIGNIFICANT CHANGE UP (ref 22–31)
CO2 SERPL-SCNC: 28 MMOL/L — SIGNIFICANT CHANGE UP (ref 22–31)
CREAT SERPL-MCNC: 0.6 MG/DL — SIGNIFICANT CHANGE UP (ref 0.5–1.3)
CREAT SERPL-MCNC: 0.6 MG/DL — SIGNIFICANT CHANGE UP (ref 0.5–1.3)
D DIMER BLD IA.RAPID-MCNC: 284 NG/ML DDU — HIGH
EOSINOPHIL # BLD AUTO: 0.17 K/UL — SIGNIFICANT CHANGE UP (ref 0–0.5)
EOSINOPHIL NFR BLD AUTO: 2.1 % — SIGNIFICANT CHANGE UP (ref 0–6)
GLUCOSE SERPL-MCNC: 71 MG/DL — SIGNIFICANT CHANGE UP (ref 70–99)
GLUCOSE SERPL-MCNC: 75 MG/DL — SIGNIFICANT CHANGE UP (ref 70–99)
HCT VFR BLD CALC: 39.4 % — SIGNIFICANT CHANGE UP (ref 34.5–45)
HGB BLD-MCNC: 13.1 G/DL — SIGNIFICANT CHANGE UP (ref 11.5–15.5)
IMM GRANULOCYTES NFR BLD AUTO: 1 % — SIGNIFICANT CHANGE UP (ref 0–1.5)
INR BLD: 0.87 RATIO — LOW (ref 0.88–1.16)
LYMPHOCYTES # BLD AUTO: 2.48 K/UL — SIGNIFICANT CHANGE UP (ref 1–3.3)
LYMPHOCYTES # BLD AUTO: 31.3 % — SIGNIFICANT CHANGE UP (ref 13–44)
MCHC RBC-ENTMCNC: 33.2 GM/DL — SIGNIFICANT CHANGE UP (ref 32–36)
MCHC RBC-ENTMCNC: 33.5 PG — SIGNIFICANT CHANGE UP (ref 27–34)
MCV RBC AUTO: 100.8 FL — HIGH (ref 80–100)
MONOCYTES # BLD AUTO: 1.3 K/UL — HIGH (ref 0–0.9)
MONOCYTES NFR BLD AUTO: 16.4 % — HIGH (ref 2–14)
NEUTROPHILS # BLD AUTO: 3.87 K/UL — SIGNIFICANT CHANGE UP (ref 1.8–7.4)
NEUTROPHILS NFR BLD AUTO: 48.9 % — SIGNIFICANT CHANGE UP (ref 43–77)
NRBC # BLD: 0 /100 WBCS — SIGNIFICANT CHANGE UP (ref 0–0)
NT-PROBNP SERPL-SCNC: 118 PG/ML — SIGNIFICANT CHANGE UP (ref 0–300)
PLATELET # BLD AUTO: 113 K/UL — LOW (ref 150–400)
POTASSIUM SERPL-MCNC: 5.3 MMOL/L — SIGNIFICANT CHANGE UP (ref 3.5–5.3)
POTASSIUM SERPL-MCNC: 5.5 MMOL/L — HIGH (ref 3.5–5.3)
POTASSIUM SERPL-SCNC: 5.3 MMOL/L — SIGNIFICANT CHANGE UP (ref 3.5–5.3)
POTASSIUM SERPL-SCNC: 5.5 MMOL/L — HIGH (ref 3.5–5.3)
PROT SERPL-MCNC: 7.4 G/DL — SIGNIFICANT CHANGE UP (ref 6–8.3)
PROTHROM AB SERPL-ACNC: 10.5 SEC — LOW (ref 10.6–13.6)
RBC # BLD: 3.91 M/UL — SIGNIFICANT CHANGE UP (ref 3.8–5.2)
RBC # FLD: 15.6 % — HIGH (ref 10.3–14.5)
SODIUM SERPL-SCNC: 133 MMOL/L — LOW (ref 135–145)
SODIUM SERPL-SCNC: 133 MMOL/L — LOW (ref 135–145)
TROPONIN T, HIGH SENSITIVITY RESULT: 19 NG/L — SIGNIFICANT CHANGE UP (ref 0–51)
WBC # BLD: 7.92 K/UL — SIGNIFICANT CHANGE UP (ref 3.8–10.5)
WBC # FLD AUTO: 7.92 K/UL — SIGNIFICANT CHANGE UP (ref 3.8–10.5)

## 2021-05-17 PROCEDURE — 99285 EMERGENCY DEPT VISIT HI MDM: CPT | Mod: 25

## 2021-05-17 PROCEDURE — 80048 BASIC METABOLIC PNL TOTAL CA: CPT

## 2021-05-17 PROCEDURE — 71045 X-RAY EXAM CHEST 1 VIEW: CPT | Mod: 26

## 2021-05-17 PROCEDURE — 85730 THROMBOPLASTIN TIME PARTIAL: CPT

## 2021-05-17 PROCEDURE — 36000 PLACE NEEDLE IN VEIN: CPT

## 2021-05-17 PROCEDURE — 85025 COMPLETE CBC W/AUTO DIFF WBC: CPT

## 2021-05-17 PROCEDURE — 85379 FIBRIN DEGRADATION QUANT: CPT

## 2021-05-17 PROCEDURE — 71045 X-RAY EXAM CHEST 1 VIEW: CPT

## 2021-05-17 PROCEDURE — 80053 COMPREHEN METABOLIC PANEL: CPT

## 2021-05-17 PROCEDURE — 93010 ELECTROCARDIOGRAM REPORT: CPT | Mod: 59

## 2021-05-17 PROCEDURE — 85610 PROTHROMBIN TIME: CPT

## 2021-05-17 PROCEDURE — 84484 ASSAY OF TROPONIN QUANT: CPT

## 2021-05-17 PROCEDURE — 83880 ASSAY OF NATRIURETIC PEPTIDE: CPT

## 2021-05-17 NOTE — ED PROVIDER NOTE - RAPID ASSESSMENT
56y F PMHx Bipolar disorder, Cerebral palsy, G-tube in place for dysphagia sent here from assisted living facility for sudden onset B/L feet swelling this morning noted by facility nurse. No SOB. Pt verbalizing pain in ED.    Juan OHARA (Scribe), have documented this rapid assessment note under the dictation of Lana Tran (YANCY), which has been reviewed and affirmed to be accurate.  Patient was seen in the waiting room as a QPA patient. The patient will be seen and further worked up in the main emergency department and their care will be completed by the main emergency department team along with a thorough physical exam. Receiving team will follow up on labs, analgesia, any clinical imaging, reassess and disposition as clinically indicated, all decisions regarding the progression of care will be made at their discretion.    Scribe Statement: Juan OHARA, attest that this documentation has been prepared under the direction and in the presence of Lana Tran (YANCY) 56y F PMHx Bipolar disorder, Cerebral palsy, G-tube in place for dysphagia sent here from assisted living facility for sudden onset B/L feet swelling this morning noted by facility nurse. no known complaint of chest pain or SOB. no fevers or redness to the LE. Per RN in triage there appears to be bilateral pitting edema to the feet. no known cardiac hx    IJuan (Scribe), have documented this rapid assessment note under the dictation of Lana Tran (YANCY), which has been reviewed and affirmed to be accurate.  Patient was seen in the waiting room as a QPA patient. The patient will be seen and further worked up in the main emergency department and their care will be completed by the main emergency department team along with a thorough physical exam. Receiving team will follow up on labs, analgesia, any clinical imaging, reassess and disposition as clinically indicated, all decisions regarding the progression of care will be made at their discretion.    Scribe Statement: I, Juan Elias, attest that this documentation has been prepared under the direction and in the presence of Lana Tran (YANCY)    Lana Tran PA-C: The scribe's documentation has been prepared under my direction and personally reviewed by me in its entirety. I confirm that the note above accurately reflects history obtained.   Patient was rapidly assessed via telemedicine encounter; a limited history was obtained. The patient will be seen and further examined and worked up in the main ED and their care will be completed by the main ED team. Receiving team will follow up on labs, analgesia, any clinical imaging, and perform reassessment and disposition of the patient as clinically indicated. All decisions regarding the progression of care will be made at their discretion.

## 2021-05-17 NOTE — ED PROVIDER NOTE - PATIENT PORTAL LINK FT
You can access the FollowMyHealth Patient Portal offered by [facility] by registering at the following website: [email or web address]. By joining [employer]’s FollowMyHealth portal, you will also be able to view your health information using other applications (apps) compatible with our system.

## 2021-05-17 NOTE — ED PROVIDER NOTE - NSFOLLOWUPINSTRUCTIONS_ED_ALL_ED_FT
We evaluated her heart and for blood clots and her work-up was negative.  We recommend elevating her feet as best as you can. Return to ER if swelling is worsening or if you develop difficulty breathing, chest pain, fever, altered mental status.

## 2021-05-17 NOTE — ED ADULT NURSE NOTE - NSIMPLEMENTINTERV_GEN_ALL_ED
Implemented All Fall Risk Interventions:  [place] to call system. Call bell, personal items and telephone within reach. Instruct patient to call for assistance. Room bathroom lighting operational. Non-slip footwear when patient is off stretcher. Physically safe environment: no spills, clutter or unnecessary equipment. Stretcher in lowest position, wheels locked, appropriate side rails in place. Provide visual cue, wrist band, yellow gown, etc. Monitor gait and stability. Monitor for mental status changes and reorient to person, place, and time. Review medications for side effects contributing to fall risk. Reinforce activity limits and safety measures with patient and family.

## 2021-05-17 NOTE — ED PROCEDURE NOTE - ATTENDING CONTRIBUTION TO CARE
Dr. Sterling (Attending Physician)  I supervised the above procedure and agree with the documented note.

## 2021-05-17 NOTE — ED ADULT NURSE NOTE - NSFALLRSKINDICTYPE_ED_ALL_ED
TRANSFER - OUT REPORT:    Verbal report given to Yasmeen Mayo RN on [employer]  being transferred to [address] [place] [address] for routine progression of care       Report consisted of patients Situation, Background, Assessment and   Recommendations(SBAR). Information from the following report(s) SBAR, Kardex, Intake/Output and MAR was reviewed with the receiving nurse. Lines:   Peripheral IV 11/14/17 Right Antecubital (Active)   Site Assessment Clean, dry, & intact 11/14/2017  8:38 PM   Phlebitis Assessment 0 11/14/2017  8:38 PM   Infiltration Assessment 0 11/14/2017  8:38 PM   Dressing Status Clean, dry, & intact 11/14/2017  8:38 PM   Dressing Type Transparent 11/14/2017  8:38 PM   Hub Color/Line Status Pink;Capped 11/14/2017  8:38 PM   Action Taken Open ports on tubing capped 11/14/2017  8:38 PM   Alcohol Cap Used Yes 11/14/2017  8:38 PM        Opportunity for questions and clarification was provided. Altered Elimination/Disorientation/Impaired Gait

## 2021-05-17 NOTE — ED PROVIDER NOTE - OBJECTIVE STATEMENT
57yo F with PMH Bipolar disorder, intellectual disability, Cerebral palsy, s/p PEG placement for dysphagia, sent to ED from group home for evaluation of b/l foot swelling noticed today. Pt accompanied by attendant Luz Maria who knows patient well and confirms swelling is new today.  Pt is bed/chair bound. Reports patient is otherwise at baseline. Denies any known complaint of chest pain or SOB. Denies fevers or redness to the LEs.   PCP Dr. Rodriguez ([place])

## 2021-05-17 NOTE — ED PROVIDER NOTE - ATTENDING CONTRIBUTION TO CARE
Dr. Sterling (Attending Physician)  I performed a history and physical exam of the patient and discussed their management with the advanced care provider. I reviewed the advanced care provider's note and agree with the documented findings and plan of care. My medical decision making and objective findings are found above.

## 2021-05-17 NOTE — ED PROVIDER NOTE - CLINICAL SUMMARY MEDICAL DECISION MAKING FREE TEXT BOX
Dr. Sterling (Attending Physician)   PMHx Bipolar disorder, Cerebral palsy, G-tube in place for dysphagia sent here from assisted living facility for sudden onset B/L feet swelling. Not hypoxic. Lung clear, breath sounds normal. Mild LE swelling. Cap refill normal, dp pulses 2+bilaterally. Will check labs, cxr, bnp, u/s LEs.

## 2021-05-17 NOTE — ED ADULT NURSE REASSESSMENT NOTE - NS ED NURSE REASSESS COMMENT FT1
Pt seen and evaluated by humera. Lab work obtained and sent to lab. Unable to obtain IV access at this time.

## 2021-05-17 NOTE — ED ADULT NURSE NOTE - OBJECTIVE STATEMENT
56 y female presents from assisted living facility with b/l feet swelling seen this morning by home nurse. Hx of Bipolar, Cerebral palsy, PEG placement for dysphagia. Accompanied by aide Michelle- aid reports patient to be at baseline. Nonverbal- awake and cooperative/ wheelchair bound. Breathing comfortably in bed- no distress. Abdomen soft nontender. Skin warm and dry- pressure ulcer noted to medial side of right foot/ dressing clean dry and intact. Safety maintained- bed locked in lowest position.

## 2021-05-17 NOTE — ED PROCEDURE NOTE - PROCEDURE ADDITIONAL DETAILS
Emergency Department Focused Ultrasound performed at patient's bedside for placement of ultrasound guided IV. The study was confirmed with blood return and ease of flushing saline.    Upper extremity laterality: R forearm  IV Gauge: 20g

## 2021-05-17 NOTE — ED PROVIDER NOTE - PROGRESS NOTE DETAILS
Spoke with nurse at [facility], Jenny ([phone]). Reports sent pt in for eval and to r/o DVT as they could not obtain outpatient vascular appointment today. - Susanne Dodson PA-C Dr. Sterling (Attending Physician)  Unable to get duplex ultrasound. D-dimer sent with very slight elevation considering bedbound status and age adjustment. Will dc home very low suspicion of DVT.

## 2021-05-26 NOTE — ED ADULT NURSE NOTE - PRO INTERPRETER NEED 2
"GYN Visit  CC: Follow-up    Tess Garcia is a 33 y.o.  who presents today for follow-up after having LEEP procedure performed for high-grade dysplasia.  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  She had had routine Pap screening and therefore this high-grade lesion developing over the course of a couple of years is concerning.  Additionally she has been using Nexplanon for contraception which is controversial in patients with Von Hippel Lindau.  She is not a candidate for estrogen-containing forms of contraception.  She does not desire to ever have pregnancy or biologic children.  Additionally she reports that off contraception her menses are quite painful and heavy for a couple of days.  For all of these reasons she desires definitive management of hysterectomy.    GYN History:  Patient's last menstrual period was 2021 (exact date).. Menarche @13.  Menses prior to Nexplanon regular, lasting 3-5 days, painful and heavy.  Last pap ,  no h/o abnormal pap.  No history of cone biopsy, LEEP or any other cervical, uterine or gynecologic surgery. No history of sexually transmitted diseases.  Currently sexually active one male partner.  Utilizing Nexplanon for contraception and has used OCP in the past.     OB History:    OB History    Para Term  AB Living   0 0 0 0 0 0   SAB TAB Ectopic Molar Multiple Live Births   0 0 0 0 0 0       Review of Systems:   Pertinent positives documented in HPI and all other systems reviewed & are negative.    All PMH, PSH, allergies, social history and FH reviewed and updated today:  Past Medical History:  Past Medical History:   Diagnosis Date   • Anesthesia     anxiety when first awoken \"freaked out\"   • High cholesterol    • Renal disorder 2014    cryoablation Right kidney   • VHL (von Hippel-Lindau syndrome) (HCC)        Past Surgical History:  Past Surgical " History:   Procedure Laterality Date   • PB UPPER GI ENDOSCOPY,DIAGNOSIS N/A 3/25/2021    Procedure: GASTROSCOPY;  Surgeon: Rashi Jaime M.D.;  Location: SURGERY [facility];  Service: Gastroenterology   • EGD W/ENDOSCOPIC ULTRASOUND  3/25/2021    Procedure: EGD, WITH ENDOSCOPIC US;  Surgeon: Rashi Jaime M.D.;  Location: SURGERY [facility];  Service: Gastroenterology   • EGD WITH ASP/BX  3/25/2021    Procedure: EGD, WITH ASPIRATION BIOPSY;  Surgeon: Rashi Jaime M.D.;  Location: SURGERY [facility];  Service: Gastroenterology   • OTHER  2014    Right kidney cryoablation   • OTHER NEUROLOGICAL SURG  2010    tumor removed from cerebellum, cyst removal from spinal cord (same procedure)   • OTHER  2010    cyst removal right eye       Medications:   Current Outpatient Medications Ordered in Epic   Medication Sig Dispense Refill   • atorvastatin (LIPITOR) 40 MG Tab atorvastatin 40 mg tablet   Take 1 tablet every day by oral route.     • Multiple Vitamin (MULTIVITAMIN ADULT PO) Take 1 tablet by mouth every evening.     • atorvastatin (LIPITOR) 40 MG Tab every evening.     • ergocalciferol (DRISDOL) 58311 UNIT capsule every 7 days.     • etonogestrel (NEXPLANON) 68 MG Implant implant Nexplanon 68 mg subdermal implant   Inject by subcutaneous route.       No current [facility]-ordered facility-administered medications on file.       Allergies: Aspirin and Dairy food allergy    Social History:  Social History     Socioeconomic History   • Marital status: Single     Spouse name: Not on file   • Number of children: Not on file   • Years of education: Not on file   • Highest education level: Not on file   Occupational History   • Not on file   Tobacco Use   • Smoking status: Never Smoker   • Smokeless tobacco: Never Used   Vaping Use   • Vaping Use: Never used   Substance and Sexual Activity   • Alcohol use: Yes     Comment: 2 drinks/month   • Drug use: No   • Sexual activity: Yes     Partners: Male     Birth  control/protection: Implant   Other Topics Concern   • Not on file   Social History Narrative   • Not on file     Social Determinants of Health     Financial Resource Strain:    • Difficulty of Paying Living Expenses:    Food Insecurity:    • Worried About Running Out of Food in the Last Year:    • Ran Out of Food in the Last Year:    Transportation Needs:    • Lack of Transportation (Medical):    • Lack of Transportation (Non-Medical):    Physical Activity:    • Days of Exercise per Week:    • Minutes of Exercise per Session:    Stress:    • Feeling of Stress :    Social Connections:    • Frequency of Communication with Friends and Family:    • Frequency of Social Gatherings with Friends and Family:    • Attends Orthodox Services:    • Active Member of Clubs or Organizations:    • Attends Club or Organization Meetings:    • Marital Status:    Intimate Partner Violence:    • Fear of Current or Ex-Partner:    • Emotionally Abused:    • Physically Abused:    • Sexually Abused:        Family History:  Family History   Problem Relation Age of Onset   • No Known Problems Mother    • No Known Problems Brother            Objective:   Vitals:  /84   Wt 93 kg (205 lb)   Body mass index is 34.11 kg/m². (Goal BM I>18 <25)    Physical Exam:   Nursing note and vitals reviewed.  GENERAL: No acute distress  HENT: Atraumatic, normocephalic  EYES: Extraocular movements intact, pupils equal and reactive to light  NECK: Supple, Full ROM  CHEST: No deformities, Equal chest expansion  RESP: Unlabored, no stridor or audible wheeze  ABD: Soft, Nontender, Non-Distended  Extremities: No Clubbing, Cyanosis, or Edema  Skin: Warm/dry, without rashes  Neuro: A/O x 4, CN 2-12 Grossly intact, Motor/sensory grossly intact  Psych: Normal mood, behavior, and affect    LEEP results:    FINAL DIAGNOSIS:     A. Anterior lip:          Ectocervical squamous mucosa demonstrates area of high-grade           squamous epithelial lesion (CELINE 2-3,  moderate to severe           dysplasia) and focal areas of HPV cytopathic effect.          Endocervical margin is focally positive for dysplasia.          No invasive carcinoma identified.   B. Posterior lip:          Ectocervical squamous mucosa demonstrates area of high-grade           squamous epithelial (CELINE-2, moderate dysplasia) and focal areas           of HPV cytopathic effect.          All resection margins uninvolved by dysplasia.          No invasive carcinoma identified.   C. Endocervical curettings:          Small fragments of benign endocervical glandular epithelium in a           background of mucus and blood.          No malignancy identified.      Assessment/Plan:   Tess Garcia is a 33 y.o.  female who presents for:    1. Von Hippel-Lindau disease (HCC)  REFERRAL TO OB/GYN SURGERY    CANCELED: REFERRAL TO OB/GYN SURGERY   2. High grade cervical glandular intraepithelial neoplasia  REFERRAL TO OB/GYN SURGERY    CANCELED: REFERRAL TO OB/GYN SURGERY   3. Abnormal uterine bleeding (AUB)  REFERRAL TO OB/GYN SURGERY    CANCELED: REFERRAL TO OB/GYN SURGERY   4. Painful menstruation  REFERRAL TO OB/GYN SURGERY    CANCELED: REFERRAL TO OB/GYN SURGERY     #Cervical dysplasia in setting of von Hippel-Lindau disease.  This condition predisposes patient to genitourinary cancers in addition to other issues.  She thoroughly understands this and desires definitive surgical management.  We discussed pathology results from her LEEP in detail today.  Margins are negative with exception of focal positive endocervical margin in the setting of a benign ECC.  Given this discussed option of close surveillance with repeat screening in 6 months the patient desires definitive surgical management.  Given her unique situation I agree with this plan.  Request has been placed for robotic assisted total laparoscopic hysterectomy with bilateral salpingectomy, Nexplanon removal, cystoscopy, possible vault suspension, and  other indicated procedures.    #Von Hippel-Lindau.  This predisposes patient to genitourinary cancers and complicates patient's options for contraception.  There is no clear safe hormonal option for contraception.  Patient is monitored closely and other specialties do that to this condition and desires definitive management with hysterectomy.  Discussed option of oophorectomy versus leaving ovaries in situ.  Given young age and benefit of hormones patient desires to retain her ovaries at this point.  And given high-grade dysplasia necessity of this plan she does not with benign ECC.  #Abnormal uterine bleeding/painful menstruation.  Patient reports that off contraception she has heavy and painful menses.  She does not desire pregnancy and therefore does not want to be off of a highly effective form of contraception.  Given heavy and painful menses she is not a candidate for a copper IUD.  At the time of proceeding to the OR for hysterectomy we will plan to remove Nexplanon.  #Follow up.  RTC for preoperative appointment or sooner if concerns or questions arise.    This encounter took 25 minutes of which > 50% of time was spent on face-to-face counseling and coordination of care regarding the above.    Please note that this note was created using voice recognition software. I have made every reasonable attempt to correct obvious errors, but expect that there are errors of grammar and possibly of content that I did not discover before finalizing note.      English

## 2021-06-02 NOTE — ED ADULT TRIAGE NOTE - ESI TRIAGE ACUITY LEVEL, MLM
Outpatient Instructions for Monitored Anesthesia Care (MAC)    1. You will be released from the hospital in the care of a responsible adult who should remain with you for at least 6 hours.    2. You are at an increased risk for falls following anesthesia. Use care when changing from a lying to a sitting position. Use your assistive devices ( example: cane, walker or family member).    3. You must NOT drive a car, climb high places such as a ladder, or operate equipment such as electric knives,stoves etc...for at least 12 hours. If you are dizzy for longer than 24 hours, notify your doctor.    4. DO NOT drink any alcoholic beverages for at least 24 hours. Anesthesia may impair your judgement.    5. If you smoke, do not smoke alone due to increased risk of burns/fires.    6. DO NOT undertake any legally binding commitment for at least 24 hours. Anesthesia may impair your judgement.   
3

## 2021-06-11 ENCOUNTER — APPOINTMENT (OUTPATIENT)
Dept: UROLOGY | Facility: CLINIC | Age: 56
End: 2021-06-11
Payer: MEDICARE

## 2021-06-11 PROCEDURE — 99213 OFFICE O/P EST LOW 20 MIN: CPT

## 2021-06-12 NOTE — PHYSICAL EXAM
[General Appearance - In No Acute Distress] : no acute distress [Abdomen Soft] : soft [Urinary Bladder Findings] : the bladder was normal on palpation [Skin Color & Pigmentation] : normal skin color and pigmentation [Heart Rate And Rhythm] : Heart rate and rhythm were normal [Exaggerated Use Of Accessory Muscles For Inspiration] : no accessory muscle use [] : no respiratory distress [FreeTextEntry1] : The patient is unable to ambulate.

## 2021-06-12 NOTE — REVIEW OF SYSTEMS
[Negative] : Musculoskeletal [Fever] : no fever [Chills] : no chills [Eye Pain] : no eye pain [Nasal Discharge] : no nasal discharge [Nosebleeds] : no nosebleeds [Chest Pain] : no chest pain [Shortness Of Breath] : no shortness of breath [Wheezing] : no wheezing [Abdominal Pain] : no abdominal pain [Vomiting] : no vomiting [Constipation] : no constipation [see HPI] : see HPI [Skin Lesions] : no skin lesions [Limb Weakness] : limb weakness [Difficulty Walking] : difficulty walking [Muscle Weakness] : muscle weakness [Easy Bleeding] : no tendency for easy bleeding [Easy Bruising] : no tendency for easy bruising

## 2021-06-12 NOTE — ASSESSMENT
[FreeTextEntry1] : The Bactrim prophylaxis seems to have been successful at least for the last 6 months and keeping her free of infection.  She will continue it for the time being.  I may ultimately try to stop it again but I think that I would like to wait for the results of the renal ultrasound before doing so.  We will try to arrange this with sedation for her so that it can be done properly and I will review those results and determine if there is any need for additional intervention.

## 2021-06-12 NOTE — HISTORY OF PRESENT ILLNESS
[FreeTextEntry1] : Mariah Ponce returns to the office today.  She is a 56-year-old woman with history of cerebral palsy.  She is not verbally communicative and she is not ambulatory.  She is living in a group home and is here today with one of her formal caregivers from at home.  She had been previously having issues with urinary tract infections on a recurrent basis.  She had also been hospitalized with a urinary tract infection last year.  I had started her on low-dose prophylactic Bactrim to see if that could help mitigate some of the risk of recurring infections for her.  She has not had any infection since that was done in December of last year.  There has been no issue of any foul odor of the urine and no fevers or chills.  She has not been hospitalized.\par \par I had recommended that the patient undergo a renal ultrasound to assess the kidneys for any evidence of stone burden which could act as a potential nidus for UTI.  She is somewhat combative with these issues and we had recommended that this be done with sedation.  This was not done since her visit last year.\par \par

## 2021-07-15 ENCOUNTER — TRANSCRIPTION ENCOUNTER (OUTPATIENT)
Age: 56
End: 2021-07-15

## 2021-07-15 ENCOUNTER — APPOINTMENT (OUTPATIENT)
Dept: ULTRASOUND IMAGING | Facility: CLINIC | Age: 56
End: 2021-07-15
Payer: MEDICARE

## 2021-07-15 ENCOUNTER — OUTPATIENT (OUTPATIENT)
Dept: OUTPATIENT SERVICES | Facility: HOSPITAL | Age: 56
LOS: 1 days | End: 2021-07-15
Payer: MEDICARE

## 2021-07-15 DIAGNOSIS — Z93.1 GASTROSTOMY STATUS: Chronic | ICD-10-CM

## 2021-07-15 DIAGNOSIS — Z00.8 ENCOUNTER FOR OTHER GENERAL EXAMINATION: ICD-10-CM

## 2021-07-15 PROCEDURE — 76775 US EXAM ABDO BACK WALL LIM: CPT | Mod: 26

## 2021-07-15 PROCEDURE — 76856 US EXAM PELVIC COMPLETE: CPT | Mod: 26

## 2021-07-15 PROCEDURE — 76856 US EXAM PELVIC COMPLETE: CPT

## 2021-07-15 PROCEDURE — 76775 US EXAM ABDO BACK WALL LIM: CPT

## 2021-07-16 DIAGNOSIS — N39.0 URINARY TRACT INFECTION, SITE NOT SPECIFIED: ICD-10-CM

## 2021-07-16 DIAGNOSIS — N83.209 UNSPECIFIED OVARIAN CYST, UNSPECIFIED SIDE: ICD-10-CM

## 2021-07-18 NOTE — PROGRESS NOTE BEHAVIORAL HEALTH - NSBHATTESTSEENBY_PSY_A_CORE
SAFETY PLAN    A suicide Safety Plan is a document that supports someone when they are having thoughts of suicide. Warning Signs that indicate a suicidal crisis may be developing: What (situations, thoughts, feelings, body sensations, behaviors, etc.) do you experience that lets you know you are beginning to think about suicide? 1. Extreme sadness  2. Uncontrollable emotions  3. Anxiety    Internal Coping Strategies:  What things can I do (relaxation techniques, hobbies, physical activities, etc.) to take my mind off my problems without contacting another person? 1. Writing in my journal  2. Reading books  3. Dancing    People and social settings that provide distraction: Who can I call or where can I go to distract me? 1. Name: Mom   2. Name: Dad   3. Place:  Work, [employer]          4. Place: [facility] provider through follow up    People whom I can ask for help: Who can I call when I need help - for example, friends, family, clergy, someone else? 1. Name:  Yary Mendosa            Phone: [phone]  2. Name: Mother, Nixon Dior Phone: [phone]  3. Name: [facility], friend   Phone: [phone]    3. Suicide Prevention Lifeline: 8-243-595-TALK (3798)    Making the environment safe: How can I make my environment (house/apartment/living space) safer? For example, can I remove guns, medications, and other items? 1. Remove temptations such as medicaitons  2.  Get rid of anxiety provoking situations, remove myself
attending Psychiatrist without NP/Trainee
attending Psychiatrist without NP/Trainee

## 2021-07-22 ENCOUNTER — APPOINTMENT (OUTPATIENT)
Dept: PULMONOLOGY | Facility: CLINIC | Age: 56
End: 2021-07-22
Payer: MEDICARE

## 2021-07-22 DIAGNOSIS — R06.2 WHEEZING: ICD-10-CM

## 2021-07-22 PROCEDURE — 99213 OFFICE O/P EST LOW 20 MIN: CPT

## 2021-07-22 NOTE — HISTORY OF PRESENT ILLNESS
[TextBox_4] : 57 y/o woman, CP, bipolar disorder, nonverbal, here for wheezing\par Accompanied by staff member from group home\par \par Pt has been noted to have audible wheezing. No distress.\par She is getting budesonide neb bid. and Albuterol prn. question is whether to give standing albuterol\par \par No cough. No resp distress noted. No fevers

## 2021-07-22 NOTE — REVIEW OF SYSTEMS
[Negative] : Endocrine [TextBox_30] : HPI [TextBox_69] : PEG [TextBox_94] : HPI [TextBox_122] : HPI [TextBox_137] : HPI

## 2021-07-22 NOTE — DISCUSSION/SUMMARY
[FreeTextEntry1] : 57 y/o woman, hx as above\par \par On exam, wheezing is upper airway. Lungs are clear.\par \par Can continue the budesonide. Alb prn

## 2021-07-22 NOTE — PHYSICAL EXAM
[No Acute Distress] : no acute distress [Normal Appearance] : normal appearance [No Neck Mass] : no neck mass [Normal Rate/Rhythm] : normal rate/rhythm [Normal S1, S2] : normal s1, s2 [No Resp Distress] : no resp distress [Clear to Auscultation Bilaterally] : clear to auscultation bilaterally [Benign] : benign [TextBox_11] : upper airway wheezing, crowded oropharynx [TextBox_2] : Nonverbal, combative when try to examine. Did not allow vitals [TextBox_89] : PEG [TextBox_99] : gissel [TextBox_105] : contractures [TextBox_132] : nonverbal

## 2021-07-27 ENCOUNTER — EMERGENCY (EMERGENCY)
Facility: HOSPITAL | Age: 56
LOS: 1 days | Discharge: HOME | End: 2021-07-27
Attending: EMERGENCY MEDICINE | Admitting: EMERGENCY MEDICINE
Payer: MEDICARE

## 2021-07-27 VITALS
OXYGEN SATURATION: 100 % | TEMPERATURE: 98 F | DIASTOLIC BLOOD PRESSURE: 72 MMHG | HEIGHT: 60 IN | HEART RATE: 80 BPM | SYSTOLIC BLOOD PRESSURE: 116 MMHG | RESPIRATION RATE: 16 BRPM

## 2021-07-27 DIAGNOSIS — Z93.1 GASTROSTOMY STATUS: Chronic | ICD-10-CM

## 2021-07-27 PROCEDURE — 99283 EMERGENCY DEPT VISIT LOW MDM: CPT | Mod: 25,GC

## 2021-07-27 PROCEDURE — 43762 RPLC GTUBE NO REVJ TRC: CPT | Mod: GC

## 2021-07-27 PROCEDURE — 74019 RADEX ABDOMEN 2 VIEWS: CPT | Mod: 26

## 2021-07-27 NOTE — ED PROVIDER NOTE - ATTENDING CONTRIBUTION TO CARE
DR. PERKINS, ATTENDING MD-  I performed a face to face bedside interview with the patient regarding history of present illness, review of symptoms and past medical history. I completed an independent physical exam.  I have discussed the patient's plan of care with the resident.   Documentation as above in the note.    ROS / PE / MDM written by myself.

## 2021-07-27 NOTE — ED PROCEDURE NOTE - ATTENDING CONTRIBUTION TO CARE
MD PERKINS:  I was present for the critical portions of this procedure and provided direct attending supervision.

## 2021-07-27 NOTE — ED ADULT NURSE NOTE - INTERVENTIONS DEFINITIONS
[place] to call system/Call bell, personal items and telephone within reach/Instruct patient to call for assistance/Non-slip footwear when patient is off stretcher/Physically safe environment: no spills, clutter or unnecessary equipment/Stretcher in lowest position, wheels locked, appropriate side rails in place/Monitor gait and stability

## 2021-07-27 NOTE — ED PROVIDER NOTE - NSFOLLOWUPINSTRUCTIONS_ED_ALL_ED_FT
You were seen for feeding tube replacement.    We have replaced the feeding tube and confirmed it with an XR.    Follow up with your doctor in the next 3-5 days.    If you have any concerning symptoms, seek immediate medical attention.

## 2021-07-27 NOTE — ED ADULT NURSE NOTE - OBJECTIVE STATEMENT
pt received in rm 5 non verbal at baseline, bed bound. pt with hx of intellectual delay, cerebral palsy. as per staff from  at bedside pt pulled out peg tube this morning. pt noted to have wound to left back of knee wrap with dressing over it. pt appears in NAD at this time. RR even and unlabored. will continue to monitor. awaiting xray results at this time.

## 2021-07-27 NOTE — ED PROVIDER NOTE - OBJECTIVE STATEMENT
56F w/ PMHx intellectual delay, cerebral palsy BIBEMS from group home for pulling out PEG tube in the shower. 56F w/ PMHx intellectual delay, cerebral palsy BIBEMS from group home for pulling out PEG tube in the shower.  Staff was able to replace the tube back immediately.  Scant bleeding afterwards.  Per EMS and staff, no recent f/c, sick contacts, CP, SOB, abd pain, AMS.

## 2021-07-27 NOTE — ED PROVIDER NOTE - PATIENT PORTAL LINK FT
You can access the FollowMyHealth Patient Portal offered by [facility] by registering at the following website: [email or web address]. By joining [facility]’s FollowMyHealth portal, you will also be able to view your health information using other applications (apps) compatible with our system.

## 2021-07-27 NOTE — ED ADULT NURSE NOTE - NS PRO AD NO ADVANCE DIRECTIVE
No Instructed to continue BP medications as prescribed and to take ramipril and furosemide DOS with small sips of water.

## 2021-07-27 NOTE — ED PROVIDER NOTE - CLINICAL SUMMARY MEDICAL DECISION MAKING FREE TEXT BOX
57 y/o female intellectual delay presents after accidentally dislodging g tube.  Will replace, xr for confirmation.  Likely dc.

## 2021-07-27 NOTE — ED PROVIDER NOTE - PROGRESS NOTE DETAILS
Francis PGY3 - Reviewed XR w/ radiology resident, no abnormalities or extravasation.  Gabriele butcher.  Pt. w/ staff from [facility].

## 2021-07-28 ENCOUNTER — APPOINTMENT (OUTPATIENT)
Dept: RADIOLOGY | Facility: CLINIC | Age: 56
End: 2021-07-28

## 2021-07-28 NOTE — ED PROVIDER NOTE - NS ED MD EM SELECTION
24-Jul-2021 18:00 25-Jul-2021 08:30 26-Jul-2021 13:50 26-Jul-2021 16:25 28-Jul-2021 08:29 [id number] Detailed

## 2021-07-31 NOTE — ED PROVIDER NOTE - ATTENDING CONTRIBUTION TO CARE
Regarding: wi- 38 weeks pregnant contractions   ----- Message from Sandra Lewis sent at 7/31/2021  6:53 AM CDT -----  Patient Name: Faye Sosa    Full Name of Provider seen for current symptoms:Constantino Vela MD     Pregnant (If Yes, how long?):38 weeks     Symptoms: contractions     Do you or any of your household members have the following symptoms:  Fever >100.0#F or >38.0#C: No    New or worsening cough, shortness of breath, sore throat, congestion, or runny nose: no    New onset of nausea, vomiting or diarrhea: No    New onset of loss of taste or smell, chills, repeated shaking with chills, muscle pain, or headache: No    Have you, a household member, or another person you have been in contact with tested positive for COVID-19 in the last 14 days?: No    Call Back #:[phone]     Call Center Account # for provider seen for current symptoms:[id number]    Which State are you currently located in? (enter State name in Summary field): [place]    Please update the Demographics section with the patients permanent resident address     Emergent COVID-19 Symptoms requiring Nurse Triage:  Trouble breathing, Persistent pain or pressure in the chest, New confusion, Inability to wake or stay awake, Bluish lips or face     MD Callejas:  patient seen and evaluated personally.   I agree with the History & Physical,  Impression & Plan other than what was detailed in my note.  MD Callejas  55 year old female with seizure disorder, intellectual disability, cerebral palsy, nonverbal at baseline, chronic constipation, s/p PEG tube (Originally placed Sep 2019; replaced earlier this year 2/2 to clogged) Pt has otherwise seemed her baseline as per aid. no changes. afebrile vitals stable, satting well, lungs cta b/l, heart sounds normal, pt non verbal, contracted (baseline), abd soft nt, after peg tube was flushed. Drawn back and did get gastric contents indicating appropriate placement .cxr also performed given chronic cough to ensure no aspiration which was unremarkable. aid asking for dc which did feel was reasonable as cc had been addressed and peg tube functioning normally. no surrounding eythema/purulence.

## 2021-08-13 ENCOUNTER — OUTPATIENT (OUTPATIENT)
Dept: OUTPATIENT SERVICES | Facility: HOSPITAL | Age: 56
LOS: 1 days | End: 2021-08-13
Payer: MEDICARE

## 2021-08-13 ENCOUNTER — APPOINTMENT (OUTPATIENT)
Dept: RADIOLOGY | Facility: CLINIC | Age: 56
End: 2021-08-13
Payer: MEDICARE

## 2021-08-13 DIAGNOSIS — M81.0 AGE-RELATED OSTEOPOROSIS WITHOUT CURRENT PATHOLOGICAL FRACTURE: ICD-10-CM

## 2021-08-13 DIAGNOSIS — Z93.1 GASTROSTOMY STATUS: Chronic | ICD-10-CM

## 2021-08-13 PROCEDURE — 77080 DXA BONE DENSITY AXIAL: CPT | Mod: 26

## 2021-08-13 PROCEDURE — 77080 DXA BONE DENSITY AXIAL: CPT

## 2021-08-20 ENCOUNTER — EMERGENCY (EMERGENCY)
Facility: HOSPITAL | Age: 56
LOS: 1 days | Discharge: HOME | End: 2021-08-20
Attending: EMERGENCY MEDICINE | Admitting: EMERGENCY MEDICINE
Payer: MEDICARE

## 2021-08-20 VITALS
HEIGHT: 60 IN | RESPIRATION RATE: 18 BRPM | SYSTOLIC BLOOD PRESSURE: 121 MMHG | HEART RATE: 80 BPM | DIASTOLIC BLOOD PRESSURE: 87 MMHG | TEMPERATURE: 98 F

## 2021-08-20 DIAGNOSIS — Z93.1 GASTROSTOMY STATUS: Chronic | ICD-10-CM

## 2021-08-20 PROCEDURE — 99284 EMERGENCY DEPT VISIT MOD MDM: CPT

## 2021-08-20 NOTE — ED ADULT TRIAGE NOTE - CHIEF COMPLAINT QUOTE
brought in by staff member from [facility] [place] for pulling out g-tube. pt is non-verbal at baseline as per paperwork. refusing pulse-ox reading. refusing id band on arm, placed on right leg.

## 2021-08-21 LAB
ALBUMIN SERPL ELPH-MCNC: 3.9 G/DL — SIGNIFICANT CHANGE UP (ref 3.3–5)
ALP SERPL-CCNC: 87 U/L — SIGNIFICANT CHANGE UP (ref 40–120)
ALT FLD-CCNC: 16 U/L — SIGNIFICANT CHANGE UP (ref 4–33)
ANION GAP SERPL CALC-SCNC: 15 MMOL/L — HIGH (ref 7–14)
APTT BLD: 40.3 SEC — HIGH (ref 27–36.3)
AST SERPL-CCNC: 36 U/L — HIGH (ref 4–32)
B PERT DNA SPEC QL NAA+PROBE: SIGNIFICANT CHANGE UP
B PERT+PARAPERT DNA PNL SPEC NAA+PROBE: SIGNIFICANT CHANGE UP
BASOPHILS # BLD AUTO: 0.02 K/UL — SIGNIFICANT CHANGE UP (ref 0–0.2)
BASOPHILS NFR BLD AUTO: 0.3 % — SIGNIFICANT CHANGE UP (ref 0–2)
BILIRUB SERPL-MCNC: 0.4 MG/DL — SIGNIFICANT CHANGE UP (ref 0.2–1.2)
BLD GP AB SCN SERPL QL: NEGATIVE — SIGNIFICANT CHANGE UP
BORDETELLA PARAPERTUSSIS (RAPRVP): SIGNIFICANT CHANGE UP
BUN SERPL-MCNC: 18 MG/DL — SIGNIFICANT CHANGE UP (ref 7–23)
C PNEUM DNA SPEC QL NAA+PROBE: SIGNIFICANT CHANGE UP
CALCIUM SERPL-MCNC: 9.7 MG/DL — SIGNIFICANT CHANGE UP (ref 8.4–10.5)
CHLORIDE SERPL-SCNC: 99 MMOL/L — SIGNIFICANT CHANGE UP (ref 98–107)
CO2 SERPL-SCNC: 26 MMOL/L — SIGNIFICANT CHANGE UP (ref 22–31)
CREAT SERPL-MCNC: 0.52 MG/DL — SIGNIFICANT CHANGE UP (ref 0.5–1.3)
EOSINOPHIL # BLD AUTO: 0.09 K/UL — SIGNIFICANT CHANGE UP (ref 0–0.5)
EOSINOPHIL NFR BLD AUTO: 1.4 % — SIGNIFICANT CHANGE UP (ref 0–6)
FLUAV SUBTYP SPEC NAA+PROBE: SIGNIFICANT CHANGE UP
FLUBV RNA SPEC QL NAA+PROBE: SIGNIFICANT CHANGE UP
GLUCOSE SERPL-MCNC: 85 MG/DL — SIGNIFICANT CHANGE UP (ref 70–99)
HADV DNA SPEC QL NAA+PROBE: SIGNIFICANT CHANGE UP
HCOV 229E RNA SPEC QL NAA+PROBE: SIGNIFICANT CHANGE UP
HCOV HKU1 RNA SPEC QL NAA+PROBE: SIGNIFICANT CHANGE UP
HCOV NL63 RNA SPEC QL NAA+PROBE: SIGNIFICANT CHANGE UP
HCOV OC43 RNA SPEC QL NAA+PROBE: SIGNIFICANT CHANGE UP
HCT VFR BLD CALC: 45.5 % — HIGH (ref 34.5–45)
HGB BLD-MCNC: 14.8 G/DL — SIGNIFICANT CHANGE UP (ref 11.5–15.5)
HMPV RNA SPEC QL NAA+PROBE: SIGNIFICANT CHANGE UP
HPIV1 RNA SPEC QL NAA+PROBE: SIGNIFICANT CHANGE UP
HPIV2 RNA SPEC QL NAA+PROBE: SIGNIFICANT CHANGE UP
HPIV3 RNA SPEC QL NAA+PROBE: SIGNIFICANT CHANGE UP
HPIV4 RNA SPEC QL NAA+PROBE: SIGNIFICANT CHANGE UP
IANC: 3.86 K/UL — SIGNIFICANT CHANGE UP (ref 1.5–8.5)
IMM GRANULOCYTES NFR BLD AUTO: 0.3 % — SIGNIFICANT CHANGE UP (ref 0–1.5)
INR BLD: 0.99 RATIO — SIGNIFICANT CHANGE UP (ref 0.88–1.16)
LYMPHOCYTES # BLD AUTO: 1.34 K/UL — SIGNIFICANT CHANGE UP (ref 1–3.3)
LYMPHOCYTES # BLD AUTO: 21.4 % — SIGNIFICANT CHANGE UP (ref 13–44)
M PNEUMO DNA SPEC QL NAA+PROBE: SIGNIFICANT CHANGE UP
MCHC RBC-ENTMCNC: 32.5 GM/DL — SIGNIFICANT CHANGE UP (ref 32–36)
MCHC RBC-ENTMCNC: 34.1 PG — HIGH (ref 27–34)
MCV RBC AUTO: 104.8 FL — HIGH (ref 80–100)
MONOCYTES # BLD AUTO: 0.93 K/UL — HIGH (ref 0–0.9)
MONOCYTES NFR BLD AUTO: 14.9 % — HIGH (ref 2–14)
NEUTROPHILS # BLD AUTO: 3.86 K/UL — SIGNIFICANT CHANGE UP (ref 1.8–7.4)
NEUTROPHILS NFR BLD AUTO: 61.7 % — SIGNIFICANT CHANGE UP (ref 43–77)
NRBC # BLD: 0 /100 WBCS — SIGNIFICANT CHANGE UP
NRBC # FLD: 0 K/UL — SIGNIFICANT CHANGE UP
PLATELET # BLD AUTO: 131 K/UL — LOW (ref 150–400)
POTASSIUM SERPL-MCNC: 4.3 MMOL/L — SIGNIFICANT CHANGE UP (ref 3.5–5.3)
POTASSIUM SERPL-SCNC: 4.3 MMOL/L — SIGNIFICANT CHANGE UP (ref 3.5–5.3)
PROT SERPL-MCNC: 7.8 G/DL — SIGNIFICANT CHANGE UP (ref 6–8.3)
PROTHROM AB SERPL-ACNC: 11.3 SEC — SIGNIFICANT CHANGE UP (ref 10.6–13.6)
RAPID RVP RESULT: SIGNIFICANT CHANGE UP
RBC # BLD: 4.34 M/UL — SIGNIFICANT CHANGE UP (ref 3.8–5.2)
RBC # FLD: 12.7 % — SIGNIFICANT CHANGE UP (ref 10.3–14.5)
RH IG SCN BLD-IMP: NEGATIVE — SIGNIFICANT CHANGE UP
RSV RNA SPEC QL NAA+PROBE: SIGNIFICANT CHANGE UP
RV+EV RNA SPEC QL NAA+PROBE: SIGNIFICANT CHANGE UP
SARS-COV-2 RNA SPEC QL NAA+PROBE: SIGNIFICANT CHANGE UP
SODIUM SERPL-SCNC: 140 MMOL/L — SIGNIFICANT CHANGE UP (ref 135–145)
WBC # BLD: 6.26 K/UL — SIGNIFICANT CHANGE UP (ref 3.8–10.5)
WBC # FLD AUTO: 6.26 K/UL — SIGNIFICANT CHANGE UP (ref 3.8–10.5)

## 2021-08-21 PROCEDURE — 74176 CT ABD & PELVIS W/O CONTRAST: CPT | Mod: 26

## 2021-08-21 PROCEDURE — 49450 REPLACE G/C TUBE PERC: CPT

## 2021-08-21 RX ORDER — IPRATROPIUM/ALBUTEROL SULFATE 18-103MCG
3 AEROSOL WITH ADAPTER (GRAM) INHALATION ONCE
Refills: 0 | Status: COMPLETED | OUTPATIENT
Start: 2021-08-21 | End: 2021-08-21

## 2021-08-21 RX ADMIN — Medication 3 MILLILITER(S): at 21:46

## 2021-08-21 NOTE — ED ADULT NURSE NOTE - NSIMPLEMENTINTERV_GEN_ALL_ED
Implemented All Fall Risk Interventions:  [place] to call system. Call bell, personal items and telephone within reach. Instruct patient to call for assistance. Room bathroom lighting operational. Non-slip footwear when patient is off stretcher. Physically safe environment: no spills, clutter or unnecessary equipment. Stretcher in lowest position, wheels locked, appropriate side rails in place. Provide visual cue, wrist band, yellow gown, etc. Monitor gait and stability. Monitor for mental status changes and reorient to person, place, and time. Review medications for side effects contributing to fall risk. Reinforce activity limits and safety measures with patient and family.

## 2021-08-21 NOTE — ED PROVIDER NOTE - HIV OFFER
As per medical record patient has "chronic Epps cath in place due to neurogenic bladder, gets Epps changed every 3 weeks." and "Possible UTI in the setting of chronic epps catheter."  If clinically significant please further clarify if there is a relationship between the catheter and the UTI in your progress notes.
Previously Declined (within the last year)

## 2021-08-21 NOTE — ED PROVIDER NOTE - PATIENT PORTAL LINK FT
You can access the FollowMyHealth Patient Portal offered by [facility] by registering at the following website: [email or web address]. By joining [facility]’s FollowMyHealth portal, you will also be able to view your health information using other applications (apps) compatible with our system. You can access the FollowMyHealth Patient Portal offered by [facility] by registering at the following website: [email or web address]. By joining [facility]’s FollowMyHealth portal, you will also be able to view your health information using other applications (apps) compatible with our system.

## 2021-08-21 NOTE — ED PROVIDER NOTE - PROGRESS NOTE DETAILS
Cisco, PGY3: spoke with IR fellow/resident, not in house, will need to evaluate stoma when comes in to see if procedure can get done today, if not will need to be addressed later in hospital course. Cisco, PGY3: IR aware of labs, need to call in anesthesia and additional staff for procedure. no ETA on procedure Cisco, PGY3: pt returned from IR suite after PEG tube placed no complications. Now VSS, well appearing safe d/c back to group home [facility] [address] [facility] [id number] . Will arrange non emergent back home. Jeffrey, PGY-3: signout received on this patient. hypothermic 95, put on jose cristiane, discussed with anesthesia, hypothermia common after post-procedure, stable for discharge, repeat rectal temp 97 Jeffrey, PGY-3: signout received on this discharged patient, who is still in ED awaiting transportation. hypothermia to 95 noted by RN, put on jose sauer, discussed with anesthesia, hypothermia common after post-procedure, stable for discharge, repeat rectal temp 97

## 2021-08-21 NOTE — ED PROVIDER NOTE - PHYSICAL EXAMINATION
Gen: WDWN, NAD  HEENT: EOMI, no nasal discharge, mucous membranes moist  CV: RRR, +S1/S2, no M/R/G  Resp: CTAB, no W/R/R  GI: Abdomen soft non-distended, stoma w/ mild surrounding erythema - apparently closed w/o opening  MSK: No open wounds, no bruising, no LE edema  Neuro: A&Ox4, following commands, moving all four extremities spontaneously  Psych: appropriate for condition

## 2021-08-21 NOTE — PRE PROCEDURE NOTE - PRE PROCEDURE EVALUATION
Interventional Radiology Pre-Procedure Note    Diagnosis/Indication: Patient is a 56y old  Female who presents with a chief complaint of dislodged gastrostomy tube. Plan for replacement through existing tract.     PAST MEDICAL & SURGICAL HISTORY:  Cerebral palsy    Seizure disorder    Constipation    Intellectual disability    Asthma    Gastrostomy tube dependent         Allergies: Topamax (Unknown)      LABS:                        14.8   6.26  )-----------( 131      ( 21 Aug 2021 14:27 )             45.5     08-21    140  |  99  |  18  ----------------------------<  85  4.3   |  26  |  0.52    Ca    9.7      21 Aug 2021 14:27    TPro  7.8  /  Alb  3.9  /  TBili  0.4  /  DBili  x   /  AST  36<H>  /  ALT  16  /  AlkPhos  87  08-21    PT/INR - ( 21 Aug 2021 14:27 )   PT: 11.3 sec;   INR: 0.99 ratio         PTT - ( 21 Aug 2021 14:27 )  PTT:40.3 sec    Procedure/ risks/ benefits were explained, informed consent obtained from patient's mother Evonne Mendoza [phone], verbalizes understanding.  Interventional Radiology Pre-Procedure Note    Diagnosis/Indication: Patient is a 56y old Female who presents with a chief complaint of dislodged gastrostomy tube. Plan for replacement through existing tract.     PAST MEDICAL & SURGICAL HISTORY:  Cerebral palsy    Seizure disorder    Constipation    Intellectual disability    Asthma    Gastrostomy tube dependent         Allergies: Topamax (Unknown)      LABS:                        14.8   6.26  )-----------( 131      ( 21 Aug 2021 14:27 )             45.5     08-21    140  |  99  |  18  ----------------------------<  85  4.3   |  26  |  0.52    Ca    9.7      21 Aug 2021 14:27    TPro  7.8  /  Alb  3.9  /  TBili  0.4  /  DBili  x   /  AST  36<H>  /  ALT  16  /  AlkPhos  87  08-21    PT/INR - ( 21 Aug 2021 14:27 )   PT: 11.3 sec;   INR: 0.99 ratio         PTT - ( 21 Aug 2021 14:27 )  PTT:40.3 sec    Procedure/ risks/ benefits were explained, informed consent obtained from patient's mother Evonne Mendoza [phone], verbalizes understanding.

## 2021-08-21 NOTE — ED PROVIDER NOTE - OBJECTIVE STATEMENT
56F hx intellectual delay, cerebral palsy w/ longtime PEG tube p/w peg tube complication. Per aide at bedside unsure when tube was pulled out - states 10 pm however repeating what she was told, believes may have been removed prior.     On exam stoma obviously closed.

## 2021-08-21 NOTE — ED ADULT NURSE NOTE - CHIEF COMPLAINT QUOTE
brought in by staff member from [facility] for pulling out g-tube. pt is non-verbal at baseline as per paperwork. refusing pulse-ox reading. refusing id band on arm, placed on right leg.

## 2021-08-21 NOTE — ED PROVIDER NOTE - ATTENDING CONTRIBUTION TO CARE
MD Prieto:  I performed a face to face bedside interview with patient regarding history of present illness, review of symptoms and past medical history. I completed an independent physical exam(documented below).  I have discussed patient's plan of care with resident.   I agree with note as stated above, having amended the EMR as needed to reflect my findings. I have discussed the assessment and plan of care.  This includes during the time I functioned as the attending physician for this patient.  PE:  Gen: Alert, NAD  Head: NC, AT,  EOMI, normal lids/conjunctiva  ENT:  normal hearing, patent oropharynx without erythema/exudate  Neck: +supple, no tenderness/meningismus/JVD, +Trachea midline  Chest: no chest wall tenderness, equal chest rise  Pulm: Bilateral BS, normal resp effort, no wheeze/stridor/retractions  CV: RRR, no M/R/G, +dist pulses  Abd: +BS, soft, ND/NT, stoma w/ mild surrounding erythema - apparently closed  Rectal: deferred  Mskel: no edema/erythema/cyanosis  Skin: no rash  Neuro: AAOx3  MDM:   55yo F w/ pmh as above, bib staff member from group home for unintentional g-tube dislodgement, unknown time out. Stoma appears to be closed. Imaging, IR for replacement.

## 2021-08-21 NOTE — ED PROVIDER NOTE - CLINICAL SUMMARY MEDICAL DECISION MAKING FREE TEXT BOX
56F w/ PEG out - on exam unable to pass new PEG, concern for stoma closure. CT, likely IR for new stoma. Priorly 20F PEG.

## 2021-08-21 NOTE — PRE PROCEDURE NOTE - PROCEDURE SERVICE
Mother has been made aware of sent medication
Vascular and Interventional Radiology
Vascular and Interventional Radiology

## 2021-08-21 NOTE — ED PROVIDER NOTE - NSICDXPASTMEDICALHX_GEN_ALL_CORE_FT
PAST MEDICAL HISTORY:  Asthma     Cerebral palsy     Constipation     Intellectual disability     Seizure disorder

## 2021-08-21 NOTE — ED PROVIDER NOTE - NSFOLLOWUPINSTRUCTIONS_ED_ALL_ED_FT
- Please take any prescribed medications as instructed by your PMD    - Be sure to return to the ED if you develop new or worsening symptoms. Specific signs and symptoms to be vigilant of: fever or chills, chest pain, difficulty breathing, palpitations, loss of consciousness, headache, vision changes, slurred speech, difficulty swallowing or drooling, facial droop, weakness in the arms or legs, numbness or tingling, abdominal pain, nausea or vomiting, diarrhea, constipation, blood in the stool or urine, pain on urination, difficulty urinating.

## 2021-08-21 NOTE — ED ADULT NURSE NOTE - OBJECTIVE STATEMENT
Pt presents to room 22, non-verbal, wheelchair bound at baseline, coming from [facility], accompanied with staff member coming to ED for dislodge g-tube. PMHX intellectual disabilities, Cerebral Palsy, seizures and dysphagia. G-tube dislodge to LLQ, white discharge noted to area, no swelling or redness noted. Pt changed and reposition, skin intact. Skin injury noted to underneath of left knee. Awaiting MD mason.

## 2021-08-21 NOTE — PROCEDURE NOTE - PROCEDURE FINDINGS AND DETAILS
Stoma narrowed however able to recannulate using Berenstein catheter. Tract dilated to 16F, new 16F gastrostomy tube placed under fluoroscopic guidance. Contrast administrated confirmed final position in stomach. Stoma narrowed however able to recannulate using Berenstein catheter. Tract dilated to 16F, new 16F gastrostomy tube placed with fluoroscopic guidance. Contrast administrated confirmed final position in stomach.

## 2021-08-22 ENCOUNTER — EMERGENCY (EMERGENCY)
Facility: HOSPITAL | Age: 56
LOS: 1 days | Discharge: HOME | End: 2021-08-22
Attending: EMERGENCY MEDICINE | Admitting: EMERGENCY MEDICINE
Payer: MEDICARE

## 2021-08-22 VITALS
OXYGEN SATURATION: 95 % | TEMPERATURE: 97 F | SYSTOLIC BLOOD PRESSURE: 134 MMHG | RESPIRATION RATE: 17 BRPM | DIASTOLIC BLOOD PRESSURE: 96 MMHG | HEART RATE: 101 BPM

## 2021-08-22 VITALS
TEMPERATURE: 96 F | HEART RATE: 74 BPM | RESPIRATION RATE: 19 BRPM | SYSTOLIC BLOOD PRESSURE: 100 MMHG | DIASTOLIC BLOOD PRESSURE: 66 MMHG | OXYGEN SATURATION: 97 %

## 2021-08-22 VITALS
TEMPERATURE: 98 F | DIASTOLIC BLOOD PRESSURE: 75 MMHG | SYSTOLIC BLOOD PRESSURE: 128 MMHG | HEART RATE: 92 BPM | HEIGHT: 60 IN | OXYGEN SATURATION: 84 % | RESPIRATION RATE: 20 BRPM

## 2021-08-22 DIAGNOSIS — Z93.1 GASTROSTOMY STATUS: Chronic | ICD-10-CM

## 2021-08-22 LAB — VALPROATE SERPL-MCNC: 37.2 UG/ML — LOW (ref 50–100)

## 2021-08-22 PROCEDURE — 99283 EMERGENCY DEPT VISIT LOW MDM: CPT

## 2021-08-22 RX ORDER — LEVETIRACETAM 250 MG/1
500 TABLET, FILM COATED ORAL ONCE
Refills: 0 | Status: COMPLETED | OUTPATIENT
Start: 2021-08-22 | End: 2021-08-22

## 2021-08-22 RX ORDER — VALPROIC ACID (AS SODIUM SALT) 250 MG/5ML
750 SOLUTION, ORAL ORAL ONCE
Refills: 0 | Status: DISCONTINUED | OUTPATIENT
Start: 2021-08-22 | End: 2021-08-22

## 2021-08-22 RX ORDER — VALPROIC ACID (AS SODIUM SALT) 250 MG/5ML
750 SOLUTION, ORAL ORAL ONCE
Refills: 0 | Status: COMPLETED | OUTPATIENT
Start: 2021-08-22 | End: 2021-08-22

## 2021-08-22 RX ORDER — LACOSAMIDE 50 MG/1
200 TABLET ORAL ONCE
Refills: 0 | Status: DISCONTINUED | OUTPATIENT
Start: 2021-08-22 | End: 2021-08-22

## 2021-08-22 RX ADMIN — LEVETIRACETAM 500 MILLIGRAM(S): 250 TABLET, FILM COATED ORAL at 09:45

## 2021-08-22 RX ADMIN — Medication 750 MILLIGRAM(S): at 16:22

## 2021-08-22 RX ADMIN — LACOSAMIDE 200 MILLIGRAM(S): 50 TABLET ORAL at 09:46

## 2021-08-22 RX ADMIN — Medication 750 MILLIGRAM(S): at 09:54

## 2021-08-22 NOTE — ED ADULT NURSE REASSESSMENT NOTE - NS ED NURSE REASSESS COMMENT FT1
18g US guided IV established to L AC, placed by ED Resident Jeronimo.
Break RN: Report to CARON GARNER. Pt brought to IR. Plan for patient to get procedure, report back to ED and be discharged per MD Bloch. Pt wheelchair in ED. to go home with pt back to living facility.
Pt awake and alert resting in bed. Respirations even and unlabored, sating 100% on room air, lung sounds clear bilaterally, expiratory wheezing resolved after duo neb administered. Pt temperature also increased after bear hugger applied - MD made aware. Vital signs within normal limits. Pt in NAD at this time. Pt awaiting disposition from MD. Will continue to monitor.
Report and pt received at change of shift. Pt presents awake and alert, non-verbal at baseline. Respirations even and unlabored, speaking in full sentences without any difficulty, sating 96% on room air. Pt noted to have expiratory wheezing, MD made aware, administered medications as per order. Pt temperature 95.5 rectally, applied bear hugger and blankets to help pt warm up. Pt in NAD at this time, staff member from group home at bedside. Will continue to monitor.
pt awake, alert, at baseline mental status. as per IR resident, pt needs labs for PEG tube procedure to be done in IR. after multiple RN attempts, unable to gain IV access. MD Matt aware.
receiving pt from night RN. pt non verbal, aggressive while trying to reposition. breathing even, non labored. bed rails up x 2 for safety. staff member at bedside.
red, flat, rash noted to R clavicle region. pt not itching, breathing even, non labored. MD made aware.
upon return from IR, pt noted to be hypothermic with axillary temp, forehead temp. rectal temp taken, pt noted to have BM. pt cleaned, dried repositioned. after pt cleaned, rectal temp noted to be 95.4. MD H. Bloch notified. multiple hot packs applied to pt. pt layered in multiple warmed blankets. rectal temp to be reassessed in 1 hour (20:40). pt awake, alert, baseline mental status as per group home staff member that remains at bedside.
pt returned from IR, escorted by IR RN and MD. pt awake, alert, at baseline mental status as per group home staff member at bedside. pt aggressive during VS assessment. pt awaiting SW consult for discharge. upon return to ED, pt has been evaluated by ED attending H. Bloch and ED Resident. pt breathing even, non labored. PEG tube site dressed.

## 2021-08-22 NOTE — PROVIDER CONTACT NOTE (OTHER) - ASSESSMENT
Writer informed by MD that pt is cleared for discharge and ready to return to Group Home at this time. Pt with hx of CP and bed bound with full needs. Pt from [facility]. Writer found number in chart for Supervisor/Lead Nurse, Jenny, at [phone]. Writer contacted and spoke with Jenny who was informed of pt’s discharge. Mode of transportation for discharge is AMBULANCE as pt is bed bound. Group home address is [address] [place] [address], [facility]. Jenny confirmed 24/7 staff with pt able to return. Group home main number is [phone]. Group home also called directly and told of  time of 6am. Writer spoke with Vivian.      Ambulance transportation arranged with [facility] #[phone]. Writer spoke with Candida who arranged BLS transport for 6am with trip #55A. Verbal huddle completed. Non-emergent transportation form placed in chart.

## 2021-08-22 NOTE — PROVIDER CONTACT NOTE (OTHER) - ASSESSMENT
Dory Ramírez (MD) that pt is cleared for discharge and ready to return to [facility] [place] at this time. PMH of intellectual delay non-verbal, cerebral palsy w/ chronic PEG tube, bipolar d/o, and seizure.  Pt from [facility] [place]. Writer contacted [facility] main number is [phone] spoke with Ms. Claudette –  and was informed of pt’s discharge. Ms. Claudette requested discharge summary sent with the pt. Mode of transportation for discharge is AMBULANCE as pt is bed bound. Group home address is [address] [place] [address], [id number]. Jenny confirmed 24/7 staff with pt able to return. Writer contacted  [facility] #[phone] and arranged BLS/ ambulance transportation. Writer spoke with Lindy who arranged BLS transport for 6pm with trip #153A.  Verbal huddle completed. Non Emergent ambulance form has been filled up and signed by the MD and attached to the pts envelope for EMS. There is no further social service intervention needed for this visit.

## 2021-08-22 NOTE — ED PROVIDER NOTE - PROGRESS NOTE DETAILS
Dory Ramírez PGY2: Pt continues to be asymptomatic here. Sating well on RA. No additional seizures. Pt's Valproic Acid level below normal. Likely earlier seizure activity was due to missing medication dose. Pt already given AM dose of AEDs here. Plan to d/c home.

## 2021-08-22 NOTE — ED ADULT TRIAGE NOTE - CHIEF COMPLAINT QUOTE
presents from [facility] for seizures as per EMS patient had 2 seizures witnessed by staff and EMS. PMH epilepsy intellectual disability. patient bedbound and non verbal at baseline. FS 84

## 2021-08-22 NOTE — ED ADULT NURSE REASSESSMENT NOTE - NS ED NURSE REASSESS COMMENT FT1
Received rpt from [facility]. Pt. brought in from group home for 2 seizures witnessed at facility. Pt. stable and calm at this time. #22g IV placed to right hand, labs sent as ordered. Awaiting meds from pharmacy. Will continue to monitor.

## 2021-08-22 NOTE — ED PROVIDER NOTE - ATTENDING CONTRIBUTION TO CARE
Seen and examined, pt. seen yest. after IR replacement of PEG tube during which was not able to take usual meds including 3 AEDs, then dc'd yest. p.m. and now returns after witnessed sz, self-limited, then somnolent and with dec. O2 saturation. Sent to ED with improving mental status, pt. occ. agitated when examined but consolable and redirectable. MM sl. dry, clear lungs, heart reg, abd soft, PEG tube in place, no surrounding erythema, no CVAT, no edema, NT calves.

## 2021-08-22 NOTE — ED PROVIDER NOTE - NSFOLLOWUPINSTRUCTIONS_ED_ALL_ED_FT
Please return to ED if you have worsening or new/concerning symptoms, including additional seizures, difficulty breathing, fevers, or any other new symptoms.    Please follow up with Please return to ED if you have worsening or new/concerning symptoms, including additional seizures, difficulty breathing, fevers, or any other new symptoms.    Please follow up with PCP within 1-3 days. Please return to ED if you have worsening or new/concerning symptoms, including additional seizures, difficulty breathing, fevers, or any other new symptoms.    Please follow up with PCP within 1-3 days.      PT MAY CONTINUE REGULAR DIET AND REGULAR MEDICATIONS

## 2021-08-22 NOTE — ED PROVIDER NOTE - OBJECTIVE STATEMENT
57 y/o F, PMH of intellectual delay, cerebral palsy w/ chronic PEG tube, bipolar d/o, and seizure d/o, presents to ED for seizures. Pt was recently in ED/IR suite all day yesterday for PEG tube replacement. 55 y/o F, PMH of intellectual delay, cerebral palsy w/ chronic PEG tube, bipolar d/o, and seizure d/o (on multiple AEDs), presents to ED for seizures. Pt was recently in ED/IR suite all day yesterday for PEG tube replacement. Pt did not receive home medications all day. Pt was sent home then had 55 y/o F, PMH of intellectual delay (non-verbal), cerebral palsy w/ chronic PEG tube, bipolar d/o, and seizure d/o (on multiple AEDs), presents to ED from [facility] for seizures. Pt was recently in ED/IR suite all day yesterday for PEG tube replacement. Pt did not receive home medications all day. Pt was sent back to [facility], and suffered 2 seizures witnessed by Staff and EMS on arrival back to [facility]. FS 84 on scene. Pt was sating <90% on RA following the incident. 55 y/o F, PMH of intellectual delay (non-verbal), cerebral palsy w/ chronic PEG tube, bipolar d/o, and seizure d/o (on multiple AEDs), presents to ED from [facility] for seizures. Pt was recently in ED/IR suite all day yesterday for PEG tube replacement. Pt did not receive home medications all day. Pt was sent back to [facility], and suffered 2 seizures witnessed by Staff and EMS on arrival back to [facility]. FS 84 on scene. Pt was satting <90% on RA following the incident.

## 2021-08-22 NOTE — ED PROVIDER NOTE - CLINICAL SUMMARY MEDICAL DECISION MAKING FREE TEXT BOX
55 y/o F, PMH of intellectual delay (non-verbal), cerebral palsy w/ chronic PEG tube, bipolar d/o, and seizure d/o (on multiple AEDs), presents to ED from [facility] [place] for seizures. Pt was recently in ED/IR suite all day yesterday for PEG tube replacement. Pt did not receive home medications all day. Pt was sent back to [facility], and suffered 2 seizures witnessed by Staff and EMS on arrival back to [facility].  Here in ED pt's VSS. Sating 100% on RA. Pt back to baseline.   Physical exam unremarkable.  Plan to give pt's AM dose of seizure medication, check AED levels, and obs. Likely d/c home if continues to stay at baseline, and no more seizures. 57 y/o F, PMH of intellectual delay (non-verbal), cerebral palsy w/ chronic PEG tube, bipolar d/o, and seizure d/o (on multiple AEDs), presents to ED from [facility] [place] for seizures. Pt was recently in ED/IR suite all day yesterday for PEG tube replacement. Pt did not receive home medications all day. Pt was sent back to [facility], and suffered 2 seizures witnessed by Staff and EMS on arrival back to [facility].  Here in ED pt's VSS. O2 sat 100% on RA. Pt back to baseline, awake, responsive.  Physical exam unremarkable.  Plan to give pt's AM dose of seizure medication, check AED levels, and obs. Likely d/c home if continues to stay at baseline, and no more seizures.

## 2021-08-22 NOTE — ED ADULT NURSE REASSESSMENT NOTE - NS ED NURSE REASSESS COMMENT FT1
Pt. comfortable in bed. Vitals stable. Pt. discharged but awaiting transport. Will continue to monitor.

## 2021-08-22 NOTE — ED PROVIDER NOTE - PATIENT PORTAL LINK FT
You can access the FollowMyHealth Patient Portal offered by [facility] by registering at the following website: [email or web address]. By joining [employer]’s FollowMyHealth portal, you will also be able to view your health information using other applications (apps) compatible with our system.

## 2021-08-23 NOTE — ED PROVIDER NOTE - CPE EDP EYES NORM
Chief Complaint   Patient presents with    Post-Op Check     Left hand extensor tendon repair 7/8/2021        OP:SURGEON: Dr. Nasima Brady MD  DATE OF PROCEDURE: 7-8-21  PROCEDURE: 1. Repair of extensor indices to second digit left hand     2. Repair of extensor indices proprius left hand     3. Complex repair of laceration, 4 cm in length, left hand    POD: 6 weeks    Subjective:  Cindi Monterroso is following up from the above surgery. He is PWB on left upper extremity. He ambulates with no assistive device. Pain to extremity is mild and is not taking prescribed pain medication. They denies numbness and tingling to the left upper extremity. Denies calf pain, chest pain, or shortness of breath. Patient is  participating in therapy, outpatient therapy and is also working on home exercise program.  He continues to wear the custom made right hand splint when doing activities around his house. 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 States that the motion and strength is improving in his left hand. Review of Systems -  All pertinent positives/negatives per HPI       Objective:    General: Alert and oriented X 3, normocephalic atraumatic, external ears and eye normal, sclera clear, no acute distress, respirations easy and unlabored with no audible wheezes, skin warm and dry, speech and dress appropriate for noted age, affect euthymic. Extremity:  Left Upper Extremity  Skin is clean dry and intact   mild edema noted over the index and middle fingers. 2+ Radial pulse, fingers warm with BCR  Flex/extension intact to wrist, thumb and fingers   Finger opposition intact  Finger adduction/abduction intact  Finger crossover intact  There is some limitation with extension of the second and third digits at the MP and PIP joints. able to make concentric fist with some weakness of the second and third fingers due to swelling and stiffness.   No rotational deformity. Subjectively states sensation is intact to light touch over the Median Nerve, Ulnar Nerve and Radial Nerve distribution  Incision well-healed. Temp 97.1 °F (36.2 °C) (Oral)     XR:   Not taken today. Assessment:   Diagnosis Orders   1. Laceration of hand involving extensor tendon, left, subsequent encounter       Plan:  Okay to come out of the left hand brace. Okay to increase usage of the left hand. Continue hand therapy for now and when finished continue with home exercise program.    Follow-up in 6 weeks for reevaluation. Call any questions or concerns. Electronically signed by JOEL Roberts on 8/23/2021 at 9:42 AM  Note: This report was completed using [employer] voiced recognition software. Every effort has been made to ensure accuracy; however, inadvertent computerized transcription errors may be present. normal...

## 2021-08-26 LAB — LEVETIRACETAM SERPL-MCNC: 2.9 UG/ML — LOW (ref 10–40)

## 2021-09-13 ENCOUNTER — EMERGENCY (EMERGENCY)
Facility: HOSPITAL | Age: 56
LOS: 1 days | Discharge: HOME | End: 2021-09-13
Attending: EMERGENCY MEDICINE
Payer: MEDICARE

## 2021-09-13 VITALS
OXYGEN SATURATION: 99 % | SYSTOLIC BLOOD PRESSURE: 133 MMHG | DIASTOLIC BLOOD PRESSURE: 94 MMHG | TEMPERATURE: 98 F | HEART RATE: 70 BPM | RESPIRATION RATE: 18 BRPM

## 2021-09-13 VITALS
WEIGHT: 160.06 LBS | HEART RATE: 72 BPM | SYSTOLIC BLOOD PRESSURE: 116 MMHG | DIASTOLIC BLOOD PRESSURE: 80 MMHG | RESPIRATION RATE: 18 BRPM | OXYGEN SATURATION: 96 % | HEIGHT: 60 IN

## 2021-09-13 DIAGNOSIS — Z93.1 GASTROSTOMY STATUS: Chronic | ICD-10-CM

## 2021-09-13 LAB
ALBUMIN SERPL ELPH-MCNC: 2.4 G/DL — LOW (ref 3.3–5)
ALP SERPL-CCNC: 59 U/L — SIGNIFICANT CHANGE UP (ref 40–120)
ALT FLD-CCNC: 11 U/L — SIGNIFICANT CHANGE UP (ref 10–45)
ANION GAP SERPL CALC-SCNC: 11 MMOL/L — SIGNIFICANT CHANGE UP (ref 5–17)
AST SERPL-CCNC: 37 U/L — SIGNIFICANT CHANGE UP (ref 10–40)
BILIRUB SERPL-MCNC: 0.2 MG/DL — SIGNIFICANT CHANGE UP (ref 0.2–1.2)
BUN SERPL-MCNC: 22 MG/DL — SIGNIFICANT CHANGE UP (ref 7–23)
CALCIUM SERPL-MCNC: 8.4 MG/DL — SIGNIFICANT CHANGE UP (ref 8.4–10.5)
CHLORIDE SERPL-SCNC: 101 MMOL/L — SIGNIFICANT CHANGE UP (ref 96–108)
CO2 SERPL-SCNC: 21 MMOL/L — LOW (ref 22–31)
CREAT SERPL-MCNC: 0.49 MG/DL — LOW (ref 0.5–1.3)
GLUCOSE SERPL-MCNC: 67 MG/DL — LOW (ref 70–99)
MAGNESIUM SERPL-MCNC: 1.8 MG/DL — SIGNIFICANT CHANGE UP (ref 1.6–2.6)
POTASSIUM SERPL-MCNC: 4.6 MMOL/L — SIGNIFICANT CHANGE UP (ref 3.5–5.3)
POTASSIUM SERPL-SCNC: 4.6 MMOL/L — SIGNIFICANT CHANGE UP (ref 3.5–5.3)
PROT SERPL-MCNC: 5.7 G/DL — LOW (ref 6–8.3)
SODIUM SERPL-SCNC: 133 MMOL/L — LOW (ref 135–145)

## 2021-09-13 PROCEDURE — 93010 ELECTROCARDIOGRAM REPORT: CPT

## 2021-09-13 PROCEDURE — 99284 EMERGENCY DEPT VISIT MOD MDM: CPT

## 2021-09-13 PROCEDURE — 93970 EXTREMITY STUDY: CPT | Mod: 26

## 2021-09-13 RX ORDER — VALPROIC ACID (AS SODIUM SALT) 250 MG/5ML
750 SOLUTION, ORAL ORAL ONCE
Refills: 0 | Status: COMPLETED | OUTPATIENT
Start: 2021-09-13 | End: 2021-09-13

## 2021-09-13 RX ORDER — LACOSAMIDE 50 MG/1
200 TABLET ORAL ONCE
Refills: 0 | Status: DISCONTINUED | OUTPATIENT
Start: 2021-09-13 | End: 2021-09-13

## 2021-09-13 RX ADMIN — LACOSAMIDE 200 MILLIGRAM(S): 50 TABLET ORAL at 23:01

## 2021-09-13 RX ADMIN — Medication 750 MILLIGRAM(S): at 23:01

## 2021-09-13 NOTE — ED PROVIDER NOTE - NSFOLLOWUPINSTRUCTIONS_ED_ALL_ED_FT
You were seen in the Emergency Department for ***.     1) Advance activity as tolerated.   2) Continue all previously prescribed medications as directed.    3) Follow up with your primary care physician in 24-48 hours - take copies of your results.    4) Return to the Emergency Department for worsening or persistent symptoms, and/or ANY NEW OR CONCERNING SYMPTOMS. You were seen in the Emergency Department for swelling of your feet. We found no evidence of blood clot or other acute emergency causing your swelling. It is likely related to  venous insufficiency and due to your bed bound status. Please try to use compression and elevation throughout the day to improve your swelling.    Additionally, we found that your Albumin was low, which can be a marker for protein malnutrition. We would recommend eval by a nutritionist and potentially increasing your protein intake or ensuring regular feedings.     1) Advance activity as tolerated.   2) Continue all previously prescribed medications as directed.    3) Follow up with your primary care physician in 24-48 hours - take copies of your results.    4) Return to the Emergency Department for worsening or persistent symptoms, and/or ANY NEW OR CONCERNING SYMPTOMS.

## 2021-09-13 NOTE — ED PROVIDER NOTE - CLINICAL SUMMARY MEDICAL DECISION MAKING FREE TEXT BOX
José Miguel Samuels MD (PGY-2): 57 yo F with intellectual disability, CP, bedbound, pres from group home with concerns for b/l foot swelling and left hand swelling first noticed today by staff. VSS and wnl, pt with mild to moderated pedal swelling on exam, no leg swelling, no appreciable swelling to the left hand. c/f venous insufficiency v. medication side effect, however given patient's bedbound status c/f occult DVT despite bilateral nature of swelling will obtain DVT study as well as cbc/cmp/ekg. José Miguel Samuels MD (PGY-2): 57 yo F with intellectual disability, CP, bedbound, pres from group home with concerns for b/l foot swelling and left hand swelling first noticed today by staff. VSS and wnl, pt with mild to moderated pedal swelling on exam, no leg swelling, no appreciable swelling to the left hand. c/f venous insufficiency v. medication side effect, however given patient's bedbound status c/f occult DVT despite bilateral nature of swelling will obtain DVT study as well as cbc/cmp/ekg.  Thien: foot edema, bilateral. unknown onset, no trauma. pt is bedbound but unlikely DVT bolaterally at the same time. will look at cardiac and renal fxn and nutrition status. medications also possible.

## 2021-09-13 NOTE — ED PROVIDER NOTE - ATTENDING CONTRIBUTION TO CARE
I performed a history and physical exam of the patient and discussed their management with the resident and /or advanced care provider. I reviewed the resident and /or ACP's note and agree with the documented findings and plan of care. My medical decision making and observations are found above.  Luns clear , no rales. abd soft, in usual state.

## 2021-09-13 NOTE — ED PROVIDER NOTE - OBJECTIVE STATEMENT
57 y/o F, PMH of intellectual delay (non-verbal), cerebral palsy w/ chronic PEG tube, bipolar d/o, and seizure d/o (on multiple AEDs), presents to ED from [facility] for b/l foot swelling and left hand swelling, noticed by nursing staff today. Pt is nonverbal so aide at bedside is providing majority of history. She denies any recent fevers, chills, sweats, trouble breathing, says patient is at her baseline otherwise.

## 2021-09-13 NOTE — ED PROVIDER NOTE - PROVIDER TOKENS
PROVIDER:[TOKEN:[[id number]],FOLLOWUP:[1-3 Days],ESTABLISHEDPATIENT:[T]],PROVIDER:[TOKEN:[[id number]],FOLLOWUP:[1-3 Days]]

## 2021-09-13 NOTE — ED PROVIDER NOTE - PATIENT PORTAL LINK FT
You can access the FollowMyHealth Patient Portal offered by [facility] by registering at the following website: [email or web address]. By joining [facility]’s FollowMyHealth portal, you will also be able to view your health information using other applications (apps) compatible with our system.

## 2021-09-13 NOTE — ED PROVIDER NOTE - CARE PROVIDER_API CALL
Adeel Hernandez)  Family Medicine  [address] [place] [address]  Phone: [phone]  Fax: [phone]  Established Patient  Follow Up Time: 1-3 Days    PAM PAGAN  General Practice  [address] [place] [address]  Phone: [phone]  Fax: [phone]  Follow Up Time: 1-3 Days

## 2021-09-13 NOTE — ED PROVIDER NOTE - PROGRESS NOTE DETAILS
Attending MD Donohue: assumed care of patient in sign out. HO CP, bedbound, G tube dependent presenting for evaluation of generalized edema, pending DVT US to r/o LE DVT though not likely. Labs notable for hypoalbuminemia of uncertain etiology, could be reason for edema. Patient will need outpatient follow up of this and would not require hospitalization for this. José Miguel Samuels MD (PGY-2): dvt study negative bilaterally, suspect dependent edema of feet. Will rec ace wrap/compression and elevation. additionally, pt with hypoalbuminemia on labwork, will recommend nutrition eval at group home to adjust protein requirement in tube feeds.

## 2021-09-13 NOTE — ED PROVIDER NOTE - PHYSICAL EXAMINATION
PHYSICAL EXAM:  GENERAL: Sitting comfortable in bed, in no acute distress, nonverbal, chronic contractures of upper and lower extremities, intermittently grunting  HENMT: Atraumatic, moist mucous membranes, no oropharyngeal exudates or vesicles, uvula is midline EYES: Clear bilaterally, PERRL, EOMs intact b/l  HEART: RRR, S1/S2, no murmur/gallops/rubs  RESPIRATORY: Clear to auscultation bilaterally, no wheezes/rhonchi/rales  ABDOMEN: +BS, soft, nontender, nondistended  EXTREMITIES: (+) 1+ pitting edema of the b/l feet extending to just below the ankles, good cap refill  NEURO:  A&Ox4, minimally moving all extremities without difficulty  Heme/LYMPH: No ecchymosis or bruising, no anterior/posterior cervical or supraclavicular LAD  SKIN:  Skin normal color for race, warm, dry and intact. No evidence of rash.

## 2021-09-13 NOTE — ED ADULT NURSE NOTE - NSIMPLEMENTINTERV_GEN_ALL_ED
Implemented All Fall Risk Interventions:  [place] to call system. Call bell, personal items and telephone within reach. Instruct patient to call for assistance. Room bathroom lighting operational. Non-slip footwear when patient is off stretcher. Physically safe environment: no spills, clutter or unnecessary equipment. Stretcher in lowest position, wheels locked, appropriate side rails in place. Provide visual cue, wrist band, yellow gown, etc. Monitor gait and stability. Monitor for mental status changes and reorient to person, place, and time. Review medications for side effects contributing to fall risk. Reinforce activity limits and safety measures with patient and family.

## 2021-09-13 NOTE — ED ADULT NURSE NOTE - OBJECTIVE STATEMENT
57 YO female PMHx seizure disorder, cerebral palsy with extensive intellectual diabilities, wheelchair bound BIBEMS for increased swelling to b/l LE's and b/l hands. Patient is minimally verbal, aid at bedside reports that the swelling has been going on for a few days. Patient is awake and alert, responds to verbal stimuli. no non-verbal indicators of chest pain, SOB, HA, N/V/D, abdominal pain, fever/chills, urinary symptoms, hematuria, weakness, dizziness, numbness, tinging. Peripheral pulses present b/l. Skin warm, dry and pink. Pt placed in position of comfort. Pt educated on call bell system and provided call bell. Bed in lowest position, wheels locked, appropriate side rails raised. Pt denies needs at this time.

## 2021-09-13 NOTE — ED PROVIDER NOTE - NS ED ROS FT
Gen: No fever, normal appetite  Eyes: No eye irritation or discharge  ENT: No ear pain, congestion, sore throat  Resp: No cough or trouble breathing  Cardiovascular: see HPI  Gastroenteric: No nausea/vomiting, diarrhea, constipation  :  No change in urine output; no dysuria  MS: No joint or muscle pain  Skin: No rashes  Neuro: No headache; no abnormal movements  Remainder negative, except as per the HPI

## 2021-09-14 PROCEDURE — 93970 EXTREMITY STUDY: CPT

## 2021-09-14 PROCEDURE — 93005 ELECTROCARDIOGRAM TRACING: CPT

## 2021-09-14 PROCEDURE — 83735 ASSAY OF MAGNESIUM: CPT

## 2021-09-14 PROCEDURE — 82962 GLUCOSE BLOOD TEST: CPT

## 2021-09-14 PROCEDURE — 80053 COMPREHEN METABOLIC PANEL: CPT

## 2021-09-14 PROCEDURE — 99284 EMERGENCY DEPT VISIT MOD MDM: CPT | Mod: 25

## 2021-09-24 ENCOUNTER — EMERGENCY (EMERGENCY)
Facility: HOSPITAL | Age: 56
LOS: 1 days | Discharge: HOME | End: 2021-09-24
Attending: EMERGENCY MEDICINE
Payer: MEDICARE

## 2021-09-24 VITALS
HEART RATE: 72 BPM | DIASTOLIC BLOOD PRESSURE: 88 MMHG | SYSTOLIC BLOOD PRESSURE: 164 MMHG | WEIGHT: 169.98 LBS | TEMPERATURE: 98 F | RESPIRATION RATE: 20 BRPM | HEIGHT: 60 IN | OXYGEN SATURATION: 95 %

## 2021-09-24 DIAGNOSIS — Z93.1 GASTROSTOMY STATUS: Chronic | ICD-10-CM

## 2021-09-24 PROCEDURE — 10140 I&D HMTMA SEROMA/FLUID COLLJ: CPT

## 2021-09-24 PROCEDURE — 73630 X-RAY EXAM OF FOOT: CPT | Mod: 26,RT

## 2021-09-24 PROCEDURE — 11740 EVACUATION SUBUNGUAL HMTMA: CPT

## 2021-09-24 PROCEDURE — 99283 EMERGENCY DEPT VISIT LOW MDM: CPT | Mod: 25

## 2021-09-24 PROCEDURE — 73630 X-RAY EXAM OF FOOT: CPT

## 2021-09-24 NOTE — ED ADULT NURSE NOTE - OBJECTIVE STATEMENT
Pt is a 56 yr old female with pmh of cerebral palsy, bipolar, and seizure disorder coming from home with her aide for toe pain. Pt is nonverbal and wheelchair bound at baseline- pt's aide states when she got on shift at 4 pm she noticed her right big toenail was bleeding. Pt appears comfortable and is only bothered when touching the site- it is unknown how the injury occurred. Pt is at her baseline mentality. Toenail appears to have blood collection behind it and has some old blood dried around the site. Otherwise the toe it self is normal color for race and has no signs of infection.

## 2021-09-24 NOTE — ED PROVIDER NOTE - PATIENT PORTAL LINK FT
You can access the FollowMyHealth Patient Portal offered by [facility] by registering at the following website: [email or web address]. By joining [employer]’s FollowMyHealth portal, you will also be able to view your health information using other applications (apps) compatible with our system.

## 2021-09-24 NOTE — ED PROVIDER NOTE - NSFOLLOWUPINSTRUCTIONS_ED_ALL_ED_FT
1.  Take tylenol and/or ibuprofen as instructed as needed for pain.   2.  Keep dressing clean and dry x 24hrs.  Then remove dressing and wash with soap and water, dry well and apply bacitracin 2 x per day.  3.  Follow up with your PMD in 2-3 days.  Bring a copy of your results with you to your appointment  4.  Follow up with podiatry within one week of discharge  5.  Return to the ER for worsening pain, redness, pus drainage, fevers or any other concerning symptoms

## 2021-09-24 NOTE — ED PROVIDER NOTE - ATTENDING CONTRIBUTION TO CARE
------------ATTENDING NOTE------------  pt w/ aide, unwitnessed blunt trauma to R 1st (great) toe w/ 100% subungual hematoma and partial distal nail avulsion, no fx, no infectious changes, pt otherwise at her baseline (limited as nonverbal), at her baseline VS, in depth dw all about ddx, tx, mariano, continued close outpt fu.  - Vic Suarez MD   ---------------------------------------------

## 2021-09-24 NOTE — ED PROVIDER NOTE - PROGRESS NOTE DETAILS
nail trephinated.  Xray unremarkable.  Will DC with home with podiatry follow up.  Strict return precautions provided. -Venancio Tong PA-C

## 2021-09-24 NOTE — ED PROVIDER NOTE - OBJECTIVE STATEMENT
55 yo female with PMHx of cerebral palsy (nonverbal at baseline), bipolar disorder, seizure disorder from group home presenting with right toe nail injury.  Patient unable to provide hx.  Per aid at bedside, patient started working with therapist today around 4pm when she was noted to have an injury to the nail of her right 1st toe with some mild bleeding.  No witnessed trauma today per staff.  no other complaints.

## 2021-09-24 NOTE — ED PROVIDER NOTE - NSFOLLOWUPCLINICS_GEN_ALL_ED_FT
[facility]  Podiatry  [address] [place] [address] - [address] [place] [address]  Phone: [phone]  Fax:

## 2021-09-24 NOTE — ED PROVIDER NOTE - PHYSICAL EXAMINATION
MSK: partial nail avulsion right first toe with minimal bleeding.  No notable ttp. MSK: partial nail avulsion right first toe with minimal bleeding.  No notable ttp. subungual hematoma

## 2021-09-28 NOTE — ED PROVIDER NOTE - CONSTITUTIONAL, MLM
Addendum  created 09/05/21 2101 by Brooks Augustin MD    Order list changed
Addendum  created 09/07/21 0718 by Tonya Casiano CRNA    Flowsheet accepted, Intraprocedure Flowsheets edited
Addendum  created 09/28/21 1014 by Prasanna Suárez MD    Attestation recorded in [facility] 97 filed
- - -

## 2021-10-11 ENCOUNTER — EMERGENCY (EMERGENCY)
Facility: HOSPITAL | Age: 56
LOS: 1 days | Discharge: HOME | End: 2021-10-11
Attending: EMERGENCY MEDICINE | Admitting: EMERGENCY MEDICINE
Payer: MEDICARE

## 2021-10-11 VITALS
SYSTOLIC BLOOD PRESSURE: 95 MMHG | OXYGEN SATURATION: 100 % | TEMPERATURE: 98 F | DIASTOLIC BLOOD PRESSURE: 79 MMHG | RESPIRATION RATE: 16 BRPM | HEART RATE: 79 BPM

## 2021-10-11 VITALS
DIASTOLIC BLOOD PRESSURE: 74 MMHG | RESPIRATION RATE: 18 BRPM | OXYGEN SATURATION: 97 % | HEART RATE: 71 BPM | SYSTOLIC BLOOD PRESSURE: 115 MMHG | HEIGHT: 60 IN | TEMPERATURE: 97 F

## 2021-10-11 DIAGNOSIS — Z93.1 GASTROSTOMY STATUS: Chronic | ICD-10-CM

## 2021-10-11 PROCEDURE — 99284 EMERGENCY DEPT VISIT MOD MDM: CPT | Mod: 25,GC

## 2021-10-11 PROCEDURE — 71045 X-RAY EXAM CHEST 1 VIEW: CPT | Mod: 26

## 2021-10-11 PROCEDURE — 74019 RADEX ABDOMEN 2 VIEWS: CPT | Mod: 26

## 2021-10-11 PROCEDURE — 43762 RPLC GTUBE NO REVJ TRC: CPT | Mod: GC

## 2021-10-11 NOTE — ED PROVIDER NOTE - PATIENT PORTAL LINK FT
You can access the FollowMyHealth Patient Portal offered by [facility] by registering at the following website: [email or web address]. By joining [employer]’s FollowMyHealth portal, you will also be able to view your health information using other applications (apps) compatible with our system.

## 2021-10-11 NOTE — ED PROVIDER NOTE - PHYSICAL EXAMINATION
PHYSICAL EXAM:    GENERAL: Lying in bed  HEAD:  Normocephalic  EYES: Conjunctiva and sclera are clear  ENT: No erythema/pallor/petechiae/lesions; TMs clear b/l  NECK: Supple, No JVD  LUNG: CTA b/l; no r/r/w  HEART: RRR, +S1/S2; No m/r/g  ABDOMEN: soft, NT/ND; BS audible   EXTREMITIES:  2+ Peripheral Pulses, brisk cap refill. No clubbing, cyanosis, or edema  NERVOUS SYSTEM:  AAOx3, speech clear. No sensory/motor deficits   MSK: FROM all 4 extremities, full and equal strength  SKIN: No rashes or lesions PHYSICAL EXAM:    GENERAL: Lying in bed  HEAD:  Normocephalic  EYES: Conjunctiva and sclera are clear  ENT: No erythema/pallor/petechiae/lesions  NECK: Supple  LUNG: CTA b/l; no r/r/w  HEART: RRR, +S1/S2; No m/r/g  ABDOMEN: soft, non distended, Stoma site examined, no skin excoriation/wound/purulent discharge, BS audible   EXTREMITIES:  2+ Peripheral Pulses. No clubbing, cyanosis, or edema  NERVOUS SYSTEM:  AAOx3, speech clear. No sensory/motor deficits   MSK: UL and LL contractures   SKIN: No rashes or lesions

## 2021-10-11 NOTE — ED PROVIDER NOTE - OBJECTIVE STATEMENT
56 Yr female presents after PEG tube dislodgement.   Per aide, this has happened before in Aug 2021, patient was given last meal at 7 AM and tube was dislodged then. Patient passed last bowel movement at 1 AM.   Aide also mentions oral secretions and wheezing for past 4 days. 56 Yr female presents after PEG tube dislodgement.   Per aide, this has happened before in Aug 2021, patient was given last meal at 7 AM and tube was dislodged then. Patient passed last bowel movement at 1 AM. Aide also mentions oral secretions and wheezing for past 4 days. Denies fever, nausea, vomiting, abdominal pain.

## 2021-10-11 NOTE — ED PROCEDURE NOTE - CPROC ED POST PROC CARE GUIDE1
Follow up AXR with Contrast through PEG tube to confirm location Follow up AXR with Contrast through PEG tube Size 16 to confirm location

## 2021-10-11 NOTE — ED ADULT NURSE NOTE - OBJECTIVE STATEMENT
Received patient to room 16. Patient is nonverbal, unable to communicate verbally and not ambulatory at baseline.  Patient becomes combative with staff when attempting to do an assessment, or vitals signs. Patient aide at bedside, does attempt to help with care. Patient c/o of dislodgment of G-tube. As per aide patient rolled over on the bed and it dislodge and home nurse re inserted tube, and taped around edges. Patient G-tube kishan area is red with white secretions coming from insertion area. Patient abdomen shows no discolorations, bowel sounds present. BM at 1 am. Unable to get a history on patient. A sheet from medical records from a doctor office was given. MD at bedside to evaluate. Patient does not appear to be in any distress.

## 2021-10-11 NOTE — ED PROVIDER NOTE - ATTENDING CONTRIBUTION TO CARE
I have personally performed a face to face bedside history and physical examination of this patient. I have discussed the history, examination, review of systems, assessment and plan of management with the fellow.  I have reviewed the electronic medical record and amended it to reflect my history, review of systems, physical exam, assessment and plan.    Brief HPI: 57 yo F with PMHx of cerebral palsy (nonverbal at baseline), bipolar disorder, seizure disorder from group home presenting for dislodged PEG tube.  Health aid states patient appears to be behaving at baseline.  No reported vomiting, fever, chills, diarrhea, trauma.     Vitals:   Reviewed    Exam:    GEN:  Non-toxic appearing, non-distressed, speaking full sentences, non-diaphoretic, alert   HEENT:  NCAT, neck supple, EOMI, PERRLA, sclera anicteric, no conjunctival pallor or injection, no stridor, normal voice, no tonsillar exudate, uvula midline  CV:  regular rhythm and rate, s1/s2 audible, no murmurs, rubs or gallops, peripheral pulses 2+ and symmetric  PULM:  non-labored respirations, lungs clear to auscultation bilaterally, no wheezes, crackles or rales  ABD:  non distended, non-tender, no rebound, no guarding, negative Weinberg's sign, bowel sounds normal, no cvat, peg tube in place   MSK:  no gross deformity, non-tender extremities and joints, range of motion grossly normal appearing, no extremity edema, extremities warm and well perfused   NEURO: CN II-XII intact, motor 5/5 in upper and lower extremities bilaterally, sensation grossly intact in extremities and trunk  SKIN:  warm, dry, no rash or vesicles     A/P:  57 yo F with PMHx of cerebral palsy (nonverbal at baseline), bipolar disorder, seizure disorder from group home presenting for dislodged PEG tube.  Peg tube in place, however with ruptured balloon.  Will replace PEG, x-ray.  Anticipate discharge.

## 2021-10-11 NOTE — ED PROVIDER NOTE - CLINICAL SUMMARY MEDICAL DECISION MAKING FREE TEXT BOX
56 Yr female k/c CP and seizure disorder presents after PEG tube dislodgement. VS wnl. PE wnl. Stoma site examined, no skin excoriation/wound/purulent discharge. Plan to replace PEG tube and get AXR for confirmation of location. Will observe and discharge.

## 2021-10-11 NOTE — ED PROVIDER NOTE - NSFOLLOWUPINSTRUCTIONS_ED_ALL_ED_FT
Gastrostomy Tube Replacement, Care After      This sheet gives you information about how to care for yourself after your procedure. Your health care provider may also give you more specific instructions. If you have problems or questions, contact your health care provider.      What can I expect after the procedure?  After the procedure, it is common to have:  •Mild pain in your abdomen.      •A small amount of blood-colored fluid leaking from the site of your gastrostomy tube (G-tube) replacement.        Follow these instructions at home:     •Return to your normal activities as told by your health care provider.      •You may return to your normal feedings.      •Wash your hands for at least 20 seconds before and after caring for your G-tube.    •Check the skin around your tube insertion site for:  •Redness.      •Irritation.      •Swelling.      •Drainage.      •Extra tissue growth.        •Care for your G-tube as you did before, or as told by your health care provider.      •Keep all follow-up visits. This is important.        Contact a health care provider if:    •You have a fever or chills.    •You see any of these signs on the skin near your insertion site:  •Redness.      •Irritation.      •Swelling.      •Drainage.      •Extra tissue growth.        •You continue to have pain in the abdomen or leaking around your G-tube.        Get help right away if:    •You develop bleeding or a lot of discharge around the tube.      •You have severe pain in the abdomen.      •Your new tube is not working properly.      •You are unable to get feedings into the tube.      •Your tube comes out for any reason.        Summary    •After the procedure, it is common to have mild pain in your abdomen and a small amount of blood-colored fluid.      •Return to your normal activities and feedings as told by your health care provider.      •Care for your gastrostomy tube, or G-tube, as you did before, or as told by your health care provider.      This information is not intended to replace advice given to you by your health care provider. Make sure you discuss any questions you have with your health care provider.

## 2021-10-11 NOTE — ED ADULT NURSE NOTE - ISOLATION TYPE:
None [facility] Pregnancy And Lactation Text: This medication has not been assigned a Pregnancy Risk Category. It is unknown if the medication is excreted in breast milk.

## 2021-10-11 NOTE — ED ADULT NURSE NOTE - NSIMPLEMENTINTERV_GEN_ALL_ED
Implemented All Fall Risk Interventions:  [place] to call system. Call bell, personal items and telephone within reach. Instruct patient to call for assistance. Room bathroom lighting operational. Non-slip footwear when patient is off stretcher. Physically safe environment: no spills, clutter or unnecessary equipment. Stretcher in lowest position, wheels locked, appropriate side rails in place. Provide visual cue, wrist band, yellow gown, etc. Monitor gait and stability. Monitor for mental status changes and reorient to person, place, and time. Review medications for side effects contributing to fall risk. Reinforce activity limits and safety measures with patient and family.

## 2021-10-20 NOTE — PROVIDER CONTACT NOTE (OTHER) - SITUATION
Call to pt to convey results. After verifying identity, advised of prenatal lab results per Dr. Christian's result notes and recommendations as below. Pt verbalizes understanding and has no further questions/concerns at this time. Encouraged to call clinic if she does.    DO [facility] Nurse Msg Pool  Please notify the patient of Rh positive. Rubella not immune     DO [facility] Nurse Msg Pool  Please notify the patient of neg syphillis, varicella immune     DO [facility] Nurse Msg Pool  Please notify the patient of normal iron     DO [facility] Nurse Msg Pool  Please notify the patient of borderline normal folate     BMP lab came back, glucose low 62  Pt doesn't have diabetes, had late breakfast, adequate appetite total feed

## 2021-12-02 ENCOUNTER — EMERGENCY (EMERGENCY)
Facility: HOSPITAL | Age: 56
LOS: 1 days | Discharge: HOME | End: 2021-12-02
Attending: CLINIC/CENTER
Payer: MEDICARE

## 2021-12-02 VITALS
HEART RATE: 72 BPM | WEIGHT: 220.02 LBS | OXYGEN SATURATION: 100 % | RESPIRATION RATE: 20 BRPM | TEMPERATURE: 98 F | DIASTOLIC BLOOD PRESSURE: 91 MMHG | HEIGHT: 60 IN | SYSTOLIC BLOOD PRESSURE: 126 MMHG

## 2021-12-02 DIAGNOSIS — Z93.1 GASTROSTOMY STATUS: Chronic | ICD-10-CM

## 2021-12-02 PROCEDURE — 99284 EMERGENCY DEPT VISIT MOD MDM: CPT | Mod: 25

## 2021-12-02 PROCEDURE — 49465 FLUORO EXAM OF G/COLON TUBE: CPT

## 2021-12-02 PROCEDURE — 43762 RPLC GTUBE NO REVJ TRC: CPT

## 2021-12-02 PROCEDURE — 96372 THER/PROPH/DIAG INJ SC/IM: CPT

## 2021-12-02 RX ORDER — IOHEXOL 300 MG/ML
30 INJECTION, SOLUTION INTRAVENOUS ONCE
Refills: 0 | Status: DISCONTINUED | OUTPATIENT
Start: 2021-12-02 | End: 2021-12-06

## 2021-12-02 RX ADMIN — Medication 2 MILLIGRAM(S): at 21:46

## 2021-12-02 NOTE — ED PROVIDER NOTE - PHYSICAL EXAMINATION
General: wheelchair bound.   HENT: Atraumatic, EOMI, no conjunctivae injection, moist mucosa.  Cardiovascular: RRR  Abdominal: soft and PEG tube in place, clogged, non-tender non distended, neg for guarding, no CVA tenderness

## 2021-12-02 NOTE — ED PROVIDER NOTE - RAPID ASSESSMENT
56 F with PMHx of Cerebral palsy, intellectual disability presents with clogged G-tube. Pt is well appearing.    Scribe Statement: IFranny Tiffany, attest that this documentation has been prepared under the direction and in the presence of Alban Velasquez (PA) 56 F with PMHx of Cerebral palsy, intellectual disability presents with clogged G-tube. Pt is well appearing.    Scribe Statement: I, Karyn Blanton, attest that this documentation has been prepared under the direction and in the presence of Alban Velasquez (PA)  Alban OHARA, personally performed the service described in the documentation recorded by the scribe in my presence, and it accurately and completely records my words and actions.

## 2021-12-02 NOTE — ED PROVIDER NOTE - PROGRESS NOTE DETAILS
Suleiman Ford MD, Attending: PEG 16Fr exchanged at bedside, pending xray. Suleiman Ford MD, Attending: spoke with radiology and confirmed the PEG placement.

## 2021-12-02 NOTE — ED PROVIDER NOTE - ATTENDING CONTRIBUTION TO CARE
Agree with above except noted:     chronic PEG tube p.w clogged PEG tube. unable to declogged at bedside, will replaced and xray.

## 2021-12-02 NOTE — ED ADULT NURSE NOTE - OBJECTIVE STATEMENT
Pt with developmental delay, essentially non-verbal presents for evaluation of her PEG tube which is clogged.  Per her aide, tube was found to be clogged today.  Abd firm.   Attempted to instill water into tube in ED without success.

## 2021-12-02 NOTE — ED PROVIDER NOTE - PATIENT PORTAL LINK FT
You can access the FollowMyHealth Patient Portal offered by [facility] by registering at the following website: [email or web address]. By joining [facility]’s FollowMyHealth portal, you will also be able to view your health information using other applications (apps) compatible with our system.

## 2021-12-02 NOTE — ED PROVIDER NOTE - OBJECTIVE STATEMENT
56F, h.o bipolar, cerebral palsy (chronic PEG), intellectual disability. seizures, p.w clogging of PEG tube today. no bleeding or drainage. baseline health.

## 2021-12-15 NOTE — CONSULT NOTE ADULT - SUBJECTIVE AND OBJECTIVE BOX
MICU ACCEPT NOTE     CHIEF COMPLAINT:    HPI:    PAST MEDICAL & SURGICAL HISTORY:  Intellectual disability  Constipation  Seizure disorder  Cerebral palsy  No significant past surgical history      FAMILY HISTORY:  No pertinent family history in first degree relatives      SOCIAL HISTORY:  Smoking: [ ] Never Smoked [ ] Former Smoker (__ packs x ___ years) [ ] Current Smoker  (__ packs x ___ years)  Substance Use: [ ] Never Used [ ] Used ____  EtOH Use:  Marital Status: [ ] Single [ ]  [ ]  [ ]   Sexual History:   Occupation:  Recent Travel:  Country of Birth:  Advance Directives:    Allergies    Topamax (Unknown)    Intolerances        HOME MEDICATIONS:  Home Medications:  albuterol 2.5 mg/3 mL (0.083%) inhalation solution: 3 milliliter(s) inhaled every 6 hours, As Needed (07 Jun 2018 08:12)  Artificial Tears ophthalmic ointment: 1 application to each affected eye 2 times a day (07 Jun 2018 08:33)  Artificial Tears ophthalmic solution: 2 drop(s) to each affected eye 3 times a day (07 Jun 2018 08:37)  Aspirin Enteric Coated 81 mg oral delayed release tablet: 1 tab(s) orally once a day (30 Mar 2018 09:06)  benztropine 0.5 mg oral tablet: 1 tab(s) orally 2 times a day (07 Jun 2018 08:39)  budesonide 0.5 mg/2 mL inhalation suspension: 2 milliliter(s) inhaled once a day (07 Jun 2018 08:40)  Calcium 600+D 600 mg-200 intl units oral tablet: 1 tab(s) orally 2 times a day (30 Mar 2018 09:06)  carBAMazepine 100 mg oral tablet, extended release: 1 tab(s) orally once a day (07 Jun 2018 08:44)  carBAMazepine 400 mg oral tablet, extended release: 1 tab(s) orally 2 times a day (07 Jun 2018 08:45)  clindamycin 1% topical lotion: Apply topically to affected area once a day (07 Jun 2018 08:46)  clotrimazole 1% topical cream: Apply topically to affected area 2 times a day (07 Jun 2018 08:48)  Colace 100 mg oral capsule: 3 cap(s) orally once a day (at bedtime) (07 Jun 2018 08:49)  ferrous sulfate 325 mg (65 mg elemental iron) oral tablet: 1 tab(s) orally once a day (30 Mar 2018 09:06)  Fleet Enema 7 g-19 g rectal enema: 133 milliliter(s) rectal once a day (07 Jun 2018 08:55)  fluticasone 50 mcg/inh nasal spray: 1 spray(s) nasal 2 times a day (07 Jun 2018 08:56)  folic acid 1 mg oral tablet: 1 tab(s) orally once a day (30 Mar 2018 09:06)  furosemide 40 mg oral tablet: 1 tab(s) orally once a day (30 Mar 2018 09:06)  lactulose 10 g/15 mL oral solution: 15 milliliter(s) orally 2 times a day (30 Mar 2018 09:06)  levETIRAcetam 750 mg oral tablet, extended release: 1 tab(s) orally 2 times a day (07 Jun 2018 09:00)  metroNIDAZOLE 1% topical gel: Apply topically to affected area once a day (07 Jun 2018 09:02)  Milk of Magnesia 8% oral suspension: 30 milliliter(s) orally once a day (at bedtime), As Needed - for constipation (07 Jun 2018 09:07)  Multiple Vitamins oral tablet: 1 tab(s) orally once a day (30 Mar 2018 09:06)  NexIUM 20 mg oral delayed release capsule: 1 cap(s) orally once a day (30 Mar 2018 09:06)  nystatin 100,000 units/g topical powder: Apply topically to affected area 2 times a day (07 Jun 2018 09:03)  OLANZapine 7.5 mg oral tablet: 1 tab(s) orally once a day (in the evening) (30 Mar 2018 09:06)  polyethylene glycol 3350 oral powder for reconstitution: 17 gram(s) orally once a day (07 Jun 2018 09:16)  Robitussin Cough+Chest Simon DM 10 mg-200 mg oral capsule: 2 cap(s) orally every 6 hours, As Needed (07 Jun 2018 09:22)  senna 8.6 mg oral tablet: 2 tab(s) orally once a day (at bedtime) (30 Mar 2018 09:06)  Vaseline topical ointment: Apply topically to affected area 2 times a day to Lt shoulder mole (07 Jun 2018 09:24)  Vitamin B1 100 mg oral tablet: 1 tab(s) orally once a day (30 Mar 2018 09:06)      REVIEW OF SYSTEMS:  Constitutional: [ ] negative [ ] fevers [ ] chills [ ] weight loss [ ] weight gain  HEENT: [ ] negative [ ] dry eyes [ ] eye irritation [ ] postnasal drip [ ] nasal congestion  CV: [ ] negative  [ ] chest pain [ ] orthopnea [ ] palpitations [ ] murmur  Resp: [ ] negative [ ] cough [ ] shortness of breath [ ] dyspnea [ ] wheezing [ ] sputum [ ] hemoptysis  GI: [ ] negative [ ] nausea [ ] vomiting [ ] diarrhea [ ] constipation [ ] abd pain [ ] dysphagia   : [ ] negative [ ] dysuria [ ] nocturia [ ] hematuria [ ] increased urinary frequency  Musculoskeletal: [ ] negative [ ] back pain [ ] myalgias [ ] arthralgias [ ] fracture  Skin: [ ] negative [ ] rash [ ] itch  Neurological: [ ] negative [ ] headache [ ] dizziness [ ] syncope [ ] weakness [ ] numbness  Psychiatric: [ ] negative [ ] anxiety [ ] depression  Endocrine: [ ] negative [ ] diabetes [ ] thyroid problem  Hematologic/Lymphatic: [ ] negative [ ] anemia [ ] bleeding problem  Allergic/Immunologic: [ ] negative [ ] itchy eyes [ ] nasal discharge [ ] hives [ ] angioedema  [ ] All other systems negative  [x ] Unable to assess ROS because nonverbal, lethargic    OBJECTIVE:  ICU Vital Signs Last 24 Hrs  T(C): 36.4 (08 Jun 2018 19:53), Max: 36.8 (08 Jun 2018 05:50)  T(F): 97.6 (08 Jun 2018 19:53), Max: 98.2 (08 Jun 2018 05:50)  HR: 84 (08 Jun 2018 19:53) (71 - 84)  BP: 121/79 (08 Jun 2018 19:53) (98/80 - 121/79)  BP(mean): --  ABP: --  ABP(mean): --  RR: 18 (08 Jun 2018 19:53) (18 - 18)  SpO2: 98% (08 Jun 2018 19:53) (95% - 98%)        CAPILLARY BLOOD GLUCOSE          PHYSICAL EXAM:  General: lethargic, NAD  HEENT: normal  Lymph Nodes: no lymphadenopathy  Neck: supple  Respiratory: lungs clear b/l  Cardiovascular: RRR, no MRG  Abdomen: soft, NTND  Extremities: warm, cap refill <2 sec, well perfrumsed  Skin: warm, dry, intact  Neurological: nonverbal, lethargic  Psychiatry: unable to assess    LINES: [facility]    HOSPITAL MEDICATIONS:  Standing Meds:  AQUAPHOR (petrolatum Ointment) 1 Application(s) Topical two times a day  artificial  tears Solution 2 Drop(s) Both EYES three times a day  aspirin enteric coated 81 milliGRAM(s) Oral daily  benztropine 0.5 milliGRAM(s) Oral two times a day  buDESOnide   0.5 milliGRAM(s) Respule 0.5 milliGRAM(s) Inhalation daily  calcium carbonate 1250 mG + Vitamin D (OsCal 500 + D) 1 Tablet(s) Oral two times a day  clotrimazole 1% Cream 1 Application(s) Topical two times a day  dextrose 5% + sodium chloride 0.9%. 1000 milliLiter(s) IV Continuous <Continuous>  docusate sodium 300 milliGRAM(s) Oral at bedtime  enoxaparin Injectable 40 milliGRAM(s) SubCutaneous daily  ferrous    sulfate 325 milliGRAM(s) Oral daily  fluticasone propionate 50 MICROgram(s)/spray Nasal Spray 1 Spray(s) Both Nostrils two times a day  folic acid 1 milliGRAM(s) Oral daily  fosphenytoin IVPB 500 milliGRAM(s) PE IV Intermittent two times a day  furosemide    Tablet 40 milliGRAM(s) Oral daily  lactulose Syrup 10 Gram(s) Oral two times a day  levETIRAcetam  IVPB 1000 milliGRAM(s) IV Intermittent every 12 hours  multivitamin 1 Tablet(s) Oral daily  nystatin Powder 1 Application(s) Topical two times a day  OLANZapine 7.5 milliGRAM(s) Oral at bedtime  pantoprazole    Tablet 40 milliGRAM(s) Oral before breakfast  petrolatum Ophthalmic Ointment 1 Application(s) Both EYES two times a day  polyethylene glycol 3350 17 Gram(s) Oral daily  senna 2 Tablet(s) Oral at bedtime  sodium biphosphate Rectal Enema 1 Enema Rectal daily  thiamine 100 milliGRAM(s) Oral daily  valproate sodium IVPB 500 milliGRAM(s) IV Intermittent two times a day      PRN Meds:  ALBUTerol/ipratropium for Nebulization 3 milliLiter(s) Nebulizer every 6 hours PRN  magnesium hydroxide Suspension 30 milliLiter(s) Oral at bedtime PRN      LABS:                        11.8   4.93  )-----------( 143      ( 08 Jun 2018 06:09 )             36.8     Hgb Trend: 11.8<--, 11.7<--, 13.1<--  06-08    144  |  101  |  17  ----------------------------<  75  3.3<L>   |  33<H>  |  0.77    Ca    8.7      08 Jun 2018 06:09  Phos  3.8     06-08  Mg     2.1     06-08    TPro  6.9  /  Alb  3.2<L>  /  TBili  0.2  /  DBili  x   /  AST  27  /  ALT  21  /  AlkPhos  73  06-07    Creatinine Trend: 0.77<--, 0.70<--, 0.79<--            MICROBIOLOGY:     Culture - Urine (collected 06 Jun 2018 17:53)  Source: URINE MIDSTREAM  Final Report (08 Jun 2018 08:34):    NO GROWTH AT 24 HOURS    Culture - Blood (collected 06 Jun 2018 17:29)  Source: BLOOD PERIPHERAL  Preliminary Report (08 Jun 2018 17:29):    NO ORGANISMS ISOLATED    NO ORGANISMS ISOLATED AT 48 HRS.    Culture - Blood (collected 06 Jun 2018 17:02)  Source: BLOOD VENOUS  Preliminary Report (08 Jun 2018 17:02):    NO ORGANISMS ISOLATED    NO ORGANISMS ISOLATED AT 48 HRS.        RADIOLOGY:  [ ] Reviewed and interpreted by me    EKG: MICU ACCEPT NOTE     CHIEF COMPLAINT: seizures    HPI: 53F PMH cerebral palsy, seizures, nonverbal baseline, presented to [facility] from group home on 6/7/18 for recurrent seizures. In ED, had 2 witnessed seizures that resolved with Ativan and was admitted to medical service. Was previously on Keppra 1000mg BID and Depakote 500mg BID, changed to Keppra 750mg BID on 5/30/18 by PMD. While admitted, pt with 28+ episodes of brief (45sec-1min) tonic clonic seizure activity over 24 hours. Reported to have had no fevers or infectious symptoms from halfway.     Per neuro note, seizure witnessed by resident consisted of: "Head turning from towards right, ictal cry, eye deviation to right and generalized stiffening of extremities" which aid at bedside and floor RNs state is consistent with the seizures pt has been having on the floor. After recurrent seizures, neuro recommending Fosphenytoin 500BID continuation, fosphenytoin load of 1500mg, cont keppra and depakote. MICU consulted.     PAST MEDICAL & SURGICAL HISTORY:  Intellectual disability  Constipation  Seizure disorder  Cerebral palsy  No significant past surgical history      FAMILY HISTORY:  No pertinent family history in first degree relatives      SOCIAL HISTORY:  Smoking: [ ] Never Smoked [ ] Former Smoker (__ packs x ___ years) [ ] Current Smoker  (__ packs x ___ years)  Substance Use: [ ] Never Used [ ] Used ____  EtOH Use:  Marital Status: [ ] Single [ ]  [ ]  [ ]   Sexual History:   Occupation:  Recent Travel:  Country of Birth:  Advance Directives:    Allergies    Topamax (Unknown)    Intolerances        HOME MEDICATIONS:  Home Medications:  albuterol 2.5 mg/3 mL (0.083%) inhalation solution: 3 milliliter(s) inhaled every 6 hours, As Needed (07 Jun 2018 08:12)  Artificial Tears ophthalmic ointment: 1 application to each affected eye 2 times a day (07 Jun 2018 08:33)  Artificial Tears ophthalmic solution: 2 drop(s) to each affected eye 3 times a day (07 Jun 2018 08:37)  Aspirin Enteric Coated 81 mg oral delayed release tablet: 1 tab(s) orally once a day (30 Mar 2018 09:06)  benztropine 0.5 mg oral tablet: 1 tab(s) orally 2 times a day (07 Jun 2018 08:39)  budesonide 0.5 mg/2 mL inhalation suspension: 2 milliliter(s) inhaled once a day (07 Jun 2018 08:40)  Calcium 600+D 600 mg-200 intl units oral tablet: 1 tab(s) orally 2 times a day (30 Mar 2018 09:06)  carBAMazepine 100 mg oral tablet, extended release: 1 tab(s) orally once a day (07 Jun 2018 08:44)  carBAMazepine 400 mg oral tablet, extended release: 1 tab(s) orally 2 times a day (07 Jun 2018 08:45)  clindamycin 1% topical lotion: Apply topically to affected area once a day (07 Jun 2018 08:46)  clotrimazole 1% topical cream: Apply topically to affected area 2 times a day (07 Jun 2018 08:48)  Colace 100 mg oral capsule: 3 cap(s) orally once a day (at bedtime) (07 Jun 2018 08:49)  ferrous sulfate 325 mg (65 mg elemental iron) oral tablet: 1 tab(s) orally once a day (30 Mar 2018 09:06)  Fleet Enema 7 g-19 g rectal enema: 133 milliliter(s) rectal once a day (07 Jun 2018 08:55)  fluticasone 50 mcg/inh nasal spray: 1 spray(s) nasal 2 times a day (07 Jun 2018 08:56)  folic acid 1 mg oral tablet: 1 tab(s) orally once a day (30 Mar 2018 09:06)  furosemide 40 mg oral tablet: 1 tab(s) orally once a day (30 Mar 2018 09:06)  lactulose 10 g/15 mL oral solution: 15 milliliter(s) orally 2 times a day (30 Mar 2018 09:06)  levETIRAcetam 750 mg oral tablet, extended release: 1 tab(s) orally 2 times a day (07 Jun 2018 09:00)  metroNIDAZOLE 1% topical gel: Apply topically to affected area once a day (07 Jun 2018 09:02)  Milk of Magnesia 8% oral suspension: 30 milliliter(s) orally once a day (at bedtime), As Needed - for constipation (07 Jun 2018 09:07)  Multiple Vitamins oral tablet: 1 tab(s) orally once a day (30 Mar 2018 09:06)  NexIUM 20 mg oral delayed release capsule: 1 cap(s) orally once a day (30 Mar 2018 09:06)  nystatin 100,000 units/g topical powder: Apply topically to affected area 2 times a day (07 Jun 2018 09:03)  OLANZapine 7.5 mg oral tablet: 1 tab(s) orally once a day (in the evening) (30 Mar 2018 09:06)  polyethylene glycol 3350 oral powder for reconstitution: 17 gram(s) orally once a day (07 Jun 2018 09:16)  Robitussin Cough+Chest Simon DM 10 mg-200 mg oral capsule: 2 cap(s) orally every 6 hours, As Needed (07 Jun 2018 09:22)  senna 8.6 mg oral tablet: 2 tab(s) orally once a day (at bedtime) (30 Mar 2018 09:06)  Vaseline topical ointment: Apply topically to affected area 2 times a day to Lt shoulder mole (07 Jun 2018 09:24)  Vitamin B1 100 mg oral tablet: 1 tab(s) orally once a day (30 Mar 2018 09:06)      REVIEW OF SYSTEMS:  Constitutional: [ ] negative [ ] fevers [ ] chills [ ] weight loss [ ] weight gain  HEENT: [ ] negative [ ] dry eyes [ ] eye irritation [ ] postnasal drip [ ] nasal congestion  CV: [ ] negative  [ ] chest pain [ ] orthopnea [ ] palpitations [ ] murmur  Resp: [ ] negative [ ] cough [ ] shortness of breath [ ] dyspnea [ ] wheezing [ ] sputum [ ] hemoptysis  GI: [ ] negative [ ] nausea [ ] vomiting [ ] diarrhea [ ] constipation [ ] abd pain [ ] dysphagia   : [ ] negative [ ] dysuria [ ] nocturia [ ] hematuria [ ] increased urinary frequency  Musculoskeletal: [ ] negative [ ] back pain [ ] myalgias [ ] arthralgias [ ] fracture  Skin: [ ] negative [ ] rash [ ] itch  Neurological: [ ] negative [ ] headache [ ] dizziness [ ] syncope [ ] weakness [ ] numbness  Psychiatric: [ ] negative [ ] anxiety [ ] depression  Endocrine: [ ] negative [ ] diabetes [ ] thyroid problem  Hematologic/Lymphatic: [ ] negative [ ] anemia [ ] bleeding problem  Allergic/Immunologic: [ ] negative [ ] itchy eyes [ ] nasal discharge [ ] hives [ ] angioedema  [ ] All other systems negative  [x ] Unable to assess ROS because nonverbal, lethargic    OBJECTIVE:  ICU Vital Signs Last 24 Hrs  T(C): 36.4 (08 Jun 2018 19:53), Max: 36.8 (08 Jun 2018 05:50)  T(F): 97.6 (08 Jun 2018 19:53), Max: 98.2 (08 Jun 2018 05:50)  HR: 84 (08 Jun 2018 19:53) (71 - 84)  BP: 121/79 (08 Jun 2018 19:53) (98/80 - 121/79)  BP(mean): --  ABP: --  ABP(mean): --  RR: 18 (08 Jun 2018 19:53) (18 - 18)  SpO2: 98% (08 Jun 2018 19:53) (95% - 98%)        CAPILLARY BLOOD GLUCOSE          PHYSICAL EXAM:  General: lethargic, NAD  HEENT: normal  Lymph Nodes: no lymphadenopathy  Neck: supple  Respiratory: lungs clear b/l  Cardiovascular: RRR, no MRG  Abdomen: soft, NTND  Extremities: warm, cap refill <2 sec, well perfrumsed  Skin: warm, dry, intact  Neurological: nonverbal, lethargic  Psychiatry: unable to assess    LINES: [facility] MEDICATIONS:  Standing Meds:  AQUAPHOR (petrolatum Ointment) 1 Application(s) Topical two times a day  artificial  tears Solution 2 Drop(s) Both EYES three times a day  aspirin enteric coated 81 milliGRAM(s) Oral daily  benztropine 0.5 milliGRAM(s) Oral two times a day  buDESOnide   0.5 milliGRAM(s) Respule 0.5 milliGRAM(s) Inhalation daily  calcium carbonate 1250 mG + Vitamin D (OsCal 500 + D) 1 Tablet(s) Oral two times a day  clotrimazole 1% Cream 1 Application(s) Topical two times a day  dextrose 5% + sodium chloride 0.9%. 1000 milliLiter(s) IV Continuous <Continuous>  docusate sodium 300 milliGRAM(s) Oral at bedtime  enoxaparin Injectable 40 milliGRAM(s) SubCutaneous daily  ferrous    sulfate 325 milliGRAM(s) Oral daily  fluticasone propionate 50 MICROgram(s)/spray Nasal Spray 1 Spray(s) Both Nostrils two times a day  folic acid 1 milliGRAM(s) Oral daily  fosphenytoin IVPB 500 milliGRAM(s) PE IV Intermittent two times a day  furosemide    Tablet 40 milliGRAM(s) Oral daily  lactulose Syrup 10 Gram(s) Oral two times a day  levETIRAcetam  IVPB 1000 milliGRAM(s) IV Intermittent every 12 hours  multivitamin 1 Tablet(s) Oral daily  nystatin Powder 1 Application(s) Topical two times a day  OLANZapine 7.5 milliGRAM(s) Oral at bedtime  pantoprazole    Tablet 40 milliGRAM(s) Oral before breakfast  petrolatum Ophthalmic Ointment 1 Application(s) Both EYES two times a day  polyethylene glycol 3350 17 Gram(s) Oral daily  senna 2 Tablet(s) Oral at bedtime  sodium biphosphate Rectal Enema 1 Enema Rectal daily  thiamine 100 milliGRAM(s) Oral daily  valproate sodium IVPB 500 milliGRAM(s) IV Intermittent two times a day      PRN Meds:  ALBUTerol/ipratropium for Nebulization 3 milliLiter(s) Nebulizer every 6 hours PRN  magnesium hydroxide Suspension 30 milliLiter(s) Oral at bedtime PRN      LABS:                        11.8   4.93  )-----------( 143      ( 08 Jun 2018 06:09 )             36.8     Hgb Trend: 11.8<--, 11.7<--, 13.1<--  06-08    144  |  101  |  17  ----------------------------<  75  3.3<L>   |  33<H>  |  0.77    Ca    8.7      08 Jun 2018 06:09  Phos  3.8     06-08  Mg     2.1     06-08    TPro  6.9  /  Alb  3.2<L>  /  TBili  0.2  /  DBili  x   /  AST  27  /  ALT  21  /  AlkPhos  73  06-07    Creatinine Trend: 0.77<--, 0.70<--, 0.79<--            MICROBIOLOGY:     Culture - Urine (collected 06 Jun 2018 17:53)  Source: URINE MIDSTREAM  Final Report (08 Jun 2018 08:34):    NO GROWTH AT 24 HOURS    Culture - Blood (collected 06 Jun 2018 17:29)  Source: BLOOD PERIPHERAL  Preliminary Report (08 Jun 2018 17:29):    NO ORGANISMS ISOLATED    NO ORGANISMS ISOLATED AT 48 HRS.    Culture - Blood (collected 06 Jun 2018 17:02)  Source: BLOOD VENOUS  Preliminary Report (08 Jun 2018 17:02):    NO ORGANISMS ISOLATED    NO ORGANISMS ISOLATED AT 48 HRS.        RADIOLOGY:  [ ] Reviewed and interpreted by me    EKG: Detail Level: Zone Additional Notes: Patient consent was obtained to proceed with the visit and recommended plan of care after discussion of all risks and benefits, including the risks of COVID-19 exposure.

## 2021-12-20 NOTE — ED ADULT NURSE REASSESSMENT NOTE - BP NONINVASIVE DIASTOLIC (MM HG)
Called referring provider office and spoke to Vivi to let their office know that Pt didn't have procedure.  
54

## 2021-12-21 ENCOUNTER — EMERGENCY (EMERGENCY)
Facility: HOSPITAL | Age: 56
LOS: 1 days | Discharge: HOME | End: 2021-12-21
Attending: EMERGENCY MEDICINE | Admitting: EMERGENCY MEDICINE
Payer: MEDICARE

## 2021-12-21 VITALS
HEIGHT: 60 IN | TEMPERATURE: 97 F | OXYGEN SATURATION: 93 % | SYSTOLIC BLOOD PRESSURE: 115 MMHG | DIASTOLIC BLOOD PRESSURE: 99 MMHG | HEART RATE: 120 BPM | RESPIRATION RATE: 20 BRPM

## 2021-12-21 VITALS
HEART RATE: 88 BPM | RESPIRATION RATE: 17 BRPM | TEMPERATURE: 100 F | DIASTOLIC BLOOD PRESSURE: 80 MMHG | SYSTOLIC BLOOD PRESSURE: 125 MMHG | OXYGEN SATURATION: 100 %

## 2021-12-21 DIAGNOSIS — Z93.1 GASTROSTOMY STATUS: Chronic | ICD-10-CM

## 2021-12-21 LAB
ALBUMIN SERPL ELPH-MCNC: 3.3 G/DL — SIGNIFICANT CHANGE UP (ref 3.3–5)
ALP SERPL-CCNC: 72 U/L — SIGNIFICANT CHANGE UP (ref 40–120)
ALT FLD-CCNC: 15 U/L — SIGNIFICANT CHANGE UP (ref 4–33)
ANION GAP SERPL CALC-SCNC: 12 MMOL/L — SIGNIFICANT CHANGE UP (ref 7–14)
AST SERPL-CCNC: 26 U/L — SIGNIFICANT CHANGE UP (ref 4–32)
BILIRUB SERPL-MCNC: <0.2 MG/DL — SIGNIFICANT CHANGE UP (ref 0.2–1.2)
BUN SERPL-MCNC: 20 MG/DL — SIGNIFICANT CHANGE UP (ref 7–23)
CALCIUM SERPL-MCNC: 9.2 MG/DL — SIGNIFICANT CHANGE UP (ref 8.4–10.5)
CHLORIDE SERPL-SCNC: 94 MMOL/L — LOW (ref 98–107)
CO2 SERPL-SCNC: 30 MMOL/L — SIGNIFICANT CHANGE UP (ref 22–31)
CREAT SERPL-MCNC: 0.56 MG/DL — SIGNIFICANT CHANGE UP (ref 0.5–1.3)
GLUCOSE SERPL-MCNC: 70 MG/DL — SIGNIFICANT CHANGE UP (ref 70–99)
HCT VFR BLD CALC: 42 % — SIGNIFICANT CHANGE UP (ref 34.5–45)
HGB BLD-MCNC: 13.9 G/DL — SIGNIFICANT CHANGE UP (ref 11.5–15.5)
IANC: 3.16 K/UL — SIGNIFICANT CHANGE UP (ref 1.5–8.5)
LIDOCAIN IGE QN: 32 U/L — SIGNIFICANT CHANGE UP (ref 7–60)
MCHC RBC-ENTMCNC: 33.1 GM/DL — SIGNIFICANT CHANGE UP (ref 32–36)
MCHC RBC-ENTMCNC: 34.7 PG — HIGH (ref 27–34)
MCV RBC AUTO: 104.7 FL — HIGH (ref 80–100)
PLATELET # BLD AUTO: 148 K/UL — LOW (ref 150–400)
POTASSIUM SERPL-MCNC: 4.3 MMOL/L — SIGNIFICANT CHANGE UP (ref 3.5–5.3)
POTASSIUM SERPL-SCNC: 4.3 MMOL/L — SIGNIFICANT CHANGE UP (ref 3.5–5.3)
PROT SERPL-MCNC: 7 G/DL — SIGNIFICANT CHANGE UP (ref 6–8.3)
RBC # BLD: 4.01 M/UL — SIGNIFICANT CHANGE UP (ref 3.8–5.2)
RBC # FLD: 12.8 % — SIGNIFICANT CHANGE UP (ref 10.3–14.5)
SODIUM SERPL-SCNC: 136 MMOL/L — SIGNIFICANT CHANGE UP (ref 135–145)
WBC # BLD: 6.5 K/UL — SIGNIFICANT CHANGE UP (ref 3.8–10.5)
WBC # FLD AUTO: 6.5 K/UL — SIGNIFICANT CHANGE UP (ref 3.8–10.5)

## 2021-12-21 PROCEDURE — 99284 EMERGENCY DEPT VISIT MOD MDM: CPT | Mod: GC

## 2021-12-21 NOTE — ED PROVIDER NOTE - PATIENT PORTAL LINK FT
You can access the FollowMyHealth Patient Portal offered by [facility] by registering at the following website: [email or web address]. By joining [facility]’s FollowMyHealth portal, you will also be able to view your health information using other applications (apps) compatible with our system.

## 2021-12-21 NOTE — ED ADULT NURSE NOTE - CHIEF COMPLAINT QUOTE
pt coming from [facility], community option.  pt sent to ED for abd distention.  abd distended and firm.  pt also has wet cough, non verbal at baseline with peg tube in place

## 2021-12-21 NOTE — ED ADULT TRIAGE NOTE - CHIEF COMPLAINT QUOTE
pt coming from [facility], community option.  pt sent to ED for abd distention.  abd distended and firm.  pt also has wet cough, non verbal at baseline with peg tube in place

## 2021-12-21 NOTE — ED PROVIDER NOTE - ATTENDING CONTRIBUTION TO CARE
agree with resident note    "55 y/o F w/ hx cerebral palsy, profound intellectual disability, bipolar, constipation presents with distended abd/stoma, "dec bowel sounds" sent in to be evaluated from nursing home. G tube working/flushing, residuals checked, pt has been having bowel movements. Pt unable to communicate due to intellectual disability."    PE: well appearing; unable to communicate due to intellectual disability; in no distress; CTAB/L; s1 s2 no m/r/g abd soft/NT/ND ext: no edema    Imp: given care providers endorsing abdominal pain will get labs and CT; G tube functioning; will reassess

## 2021-12-21 NOTE — ED PROVIDER NOTE - NSFOLLOWUPINSTRUCTIONS_ED_ALL_ED_FT
You were seen in the Emergency Department for abdominal distension.   Today you had bloodwork, CT scan, the results are attached.    Please follow-up with your primary care doctor within the next few days.     1) Advance activity as tolerated.    2) Continue all previously prescribed medications as directed.    3) Follow up with your primary care physician in 24-48 hours - take copies of your results.    4) Return to the Emergency Department for worsening or persistent symptoms, and/or ANY NEW OR CONCERNING SYMPTOMS as described below.    You were seen today in the emergency room for abdominal pain. Although the testing done today indicates that your pain is not from an acute emergency, your pain could still represent a more serious problem. Most commonly, the pain you are having is likely not something serious and will resolve in a few days, however testing was done today based on the symptoms that you currently have; so if you develop new or worsening symptoms this could be a sign of a problem that was not tested for and means you should come back to the emergency room or see your doctor urgently. You need to follow up with your doctor in the next 48-72 hours. If you develop any new or worsening symptoms you need to return immediately to the emergency department. If you experience any of the following please come right back to the emergency room: severe nausea and vomiting with inability to tolerate eating, severe worsening of your pain, a new fever, new bleeding in stool or vomit, confusion, new numbness or weakness, passing out.

## 2021-12-21 NOTE — ED PROVIDER NOTE - CLINICAL SUMMARY MEDICAL DECISION MAKING FREE TEXT BOX
55 y/o F w/ intellectual disability, g tube dependent, abd distended. given patient unable to communicate, ?abd tenderness to palpation, obtain labwork, CT scan.

## 2021-12-21 NOTE — ED ADULT NURSE NOTE - NSIMPLEMENTINTERV_GEN_ALL_ED
Implemented All Fall Risk Interventions:  [place] to call system. Call bell, personal items and telephone within reach. Instruct patient to call for assistance. Room bathroom lighting operational. Non-slip footwear when patient is off stretcher. Physically safe environment: no spills, clutter or unnecessary equipment. Stretcher in lowest position, wheels locked, appropriate side rails in place. Provide visual cue, wrist band, yellow gown, etc. Monitor gait and stability. Monitor for mental status changes and reorient to person, place, and time. Review medications for side effects contributing to fall risk. Reinforce activity limits and safety measures with patient and family.

## 2021-12-21 NOTE — ED PROVIDER NOTE - PHYSICAL EXAMINATION
[Const] well-appearing, resting comfortably, no acute distress  [HEENT] PERRL, EOMI, moist mucus membranes  [Neck] Supple, trachea midline  [CV] +S1/S2, no m/r/g appreciated  [Lungs] Clear to auscultations bilaterally, no adventitious lung sounds  [Abd] abd distended, ?diffuse tenderness to palpation, G tube in place  [MSK] moving upper extreimties  [Skin] warm, dry, well-perfused  [Neuro] intellectual disability

## 2021-12-21 NOTE — ED PROVIDER NOTE - OBJECTIVE STATEMENT
57 y/o F w/ hx cerebral palsy, profound intellectual disability, bipolar, constipation presents with distended abd/stoma, "dec bowel sounds" sent in to be evaluated from nursing home. G tube working/flushing, residuals checked, pt has been having bowel movements. Pt unable to communicate due to intellectual disability.

## 2021-12-21 NOTE — ED ADULT NURSE NOTE - OBJECTIVE STATEMENT
Patient presenting to  7. Patient Alert and oriented. Patient presenting with developmental delay from a group home. Spoke to patient's nurse. Patient presenting with abdominal distention for 3 days. Patient abdomen distended and tender upon touch. Patient PEG flushes without resistance. Breathing even and unlabored. No pallor or diaphoresis noted. Patient's RN was able to give a history,. Patient had two BMs today soft and large. No nausea, vomiting or diarrhea noted. Patient bowel sounds present.

## 2021-12-21 NOTE — ED ADULT NURSE NOTE - NSSEPSISCRITERIAMET_ED_A_ED
Bed: 13  Expected date:   Expected time:   Means of arrival:   Comments:  AUTUMN  
Abnormal VS & WBC

## 2021-12-21 NOTE — ED ADULT NURSE NOTE - SEPSIS REFERENCE DATA CRITERIA 1
"Requested Prescriptions   Pending Prescriptions Disp Refills     lisinopril (PRINIVIL/ZESTRIL) 40 MG tablet  Last Written Prescription Date:  03/20/2018  Last Fill Quantity: 90,  # refills: 0   Last office visit: No previous visit found with prescribing provider:  05/24/2018   Future Office Visit:     90 tablet 0     Sig: Take 1 tablet (40 mg) by mouth daily    ACE Inhibitors (Including Combos) Protocol Passed    6/16/2018  1:08 PM       Passed - Blood pressure under 140/90 in past 12 months    BP Readings from Last 3 Encounters:   05/24/18 136/68   04/20/18 118/70   03/23/18 138/72                Passed - Recent (12 mo) or future (30 days) visit within the authorizing provider's specialty    Patient had office visit in the last 12 months or has a visit in the next 30 days with authorizing provider or within the authorizing provider's specialty.  See \"Patient Info\" tab in inbasket, or \"Choose Columns\" in Meds & Orders section of the refill encounter.           Passed - Patient is age 18 or older       Passed - No active pregnancy on record       Passed - Normal serum creatinine on file in past 12 months    Recent Labs   Lab Test  05/24/18   1205   CR  0.75            Passed - Normal serum potassium on file in past 12 months    Recent Labs   Lab Test  05/24/18   1205   POTASSIUM  4.2            Passed - No positive pregnancy test in past 12 months          "
Prescription approved per [facility].  Kayla Lares RN    
Abormal VS: Temp > 100F or < 96.8F; SBP < 90 mmHG; HR > 120bpm; Resp > 24/min

## 2021-12-21 NOTE — ED PROVIDER NOTE - PROGRESS NOTE DETAILS
Jeffrey, PGY-3: Upon chart review, patient missed 3 night-time seizure meds due to being in ED. ordered home PM dose of vimpat, keppra and valproic acid to be given through OG tube. Jeffrey, PGY-3: Pt reassessed multiple times in the past several hours. pt abd exam benign, CT negative, f/u outpatient. Peg tube flushing well.

## 2021-12-22 LAB
BASOPHILS # BLD AUTO: 0.03 K/UL — SIGNIFICANT CHANGE UP (ref 0–0.2)
BASOPHILS NFR BLD AUTO: 0.5 % — SIGNIFICANT CHANGE UP (ref 0–2)
EOSINOPHIL # BLD AUTO: 0.15 K/UL — SIGNIFICANT CHANGE UP (ref 0–0.5)
EOSINOPHIL NFR BLD AUTO: 2.3 % — SIGNIFICANT CHANGE UP (ref 0–6)
IMM GRANULOCYTES NFR BLD AUTO: 0.9 % — SIGNIFICANT CHANGE UP (ref 0–1.5)
LYMPHOCYTES # BLD AUTO: 2.26 K/UL — SIGNIFICANT CHANGE UP (ref 1–3.3)
LYMPHOCYTES # BLD AUTO: 34.8 % — SIGNIFICANT CHANGE UP (ref 13–44)
MONOCYTES # BLD AUTO: 0.84 K/UL — SIGNIFICANT CHANGE UP (ref 0–0.9)
MONOCYTES NFR BLD AUTO: 12.9 % — SIGNIFICANT CHANGE UP (ref 2–14)
NEUTROPHILS # BLD AUTO: 3.16 K/UL — SIGNIFICANT CHANGE UP (ref 1.8–7.4)
NEUTROPHILS NFR BLD AUTO: 48.6 % — SIGNIFICANT CHANGE UP (ref 43–77)
NRBC # BLD: 0 /100 WBCS — SIGNIFICANT CHANGE UP
NRBC # FLD: 0 K/UL — SIGNIFICANT CHANGE UP

## 2021-12-22 PROCEDURE — 74177 CT ABD & PELVIS W/CONTRAST: CPT | Mod: 26,MA

## 2021-12-22 RX ORDER — LEVETIRACETAM 250 MG/1
600 TABLET, FILM COATED ORAL ONCE
Refills: 0 | Status: COMPLETED | OUTPATIENT
Start: 2021-12-22 | End: 2021-12-22

## 2021-12-22 RX ORDER — VALPROIC ACID (AS SODIUM SALT) 250 MG/5ML
750 SOLUTION, ORAL ORAL ONCE
Refills: 0 | Status: COMPLETED | OUTPATIENT
Start: 2021-12-22 | End: 2021-12-22

## 2021-12-22 RX ORDER — VALPROIC ACID (AS SODIUM SALT) 250 MG/5ML
750 SOLUTION, ORAL ORAL ONCE
Refills: 0 | Status: DISCONTINUED | OUTPATIENT
Start: 2021-12-22 | End: 2021-12-22

## 2021-12-22 RX ORDER — LACOSAMIDE 50 MG/1
200 TABLET ORAL ONCE
Refills: 0 | Status: DISCONTINUED | OUTPATIENT
Start: 2021-12-22 | End: 2021-12-22

## 2021-12-22 RX ADMIN — LACOSAMIDE 200 MILLIGRAM(S): 50 TABLET ORAL at 03:45

## 2021-12-22 RX ADMIN — Medication 750 MILLIGRAM(S): at 03:14

## 2021-12-22 RX ADMIN — LEVETIRACETAM 600 MILLIGRAM(S): 250 TABLET, FILM COATED ORAL at 03:43

## 2021-12-22 NOTE — PROVIDER CONTACT NOTE (OTHER) - ASSESSMENT
Met with staff at bedside, Candance, who reports she will travel home with pt.  As per Dawn, the agency vehicle is not in service at this time.  Pt will require S ambulance transport home as pt with developmental disability and is bedbound.

## 2021-12-22 NOTE — ED ADULT NURSE REASSESSMENT NOTE - NS ED NURSE REASSESS COMMENT FT1
Break RN note- patient resting quietly in bed, breathing even and nonlabored. No acute distress. Aide at bedside. Patient calm and cooperative. Patient to be medicated once meds received from pharmacy. Safety maintained. Patient stable upon exiting the room.

## 2022-01-07 ENCOUNTER — APPOINTMENT (OUTPATIENT)
Dept: RADIOLOGY | Facility: CLINIC | Age: 57
End: 2022-01-07
Payer: MEDICARE

## 2022-01-07 ENCOUNTER — EMERGENCY (EMERGENCY)
Facility: HOSPITAL | Age: 57
LOS: 1 days | Discharge: HOME | End: 2022-01-07
Attending: EMERGENCY MEDICINE | Admitting: STUDENT IN AN ORGANIZED HEALTH CARE EDUCATION/TRAINING PROGRAM
Payer: MEDICARE

## 2022-01-07 ENCOUNTER — OUTPATIENT (OUTPATIENT)
Dept: OUTPATIENT SERVICES | Facility: HOSPITAL | Age: 57
LOS: 1 days | End: 2022-01-07
Payer: MEDICARE

## 2022-01-07 VITALS — RESPIRATION RATE: 20 BRPM | OXYGEN SATURATION: 98 % | HEIGHT: 60 IN | HEART RATE: 57 BPM

## 2022-01-07 VITALS
RESPIRATION RATE: 20 BRPM | OXYGEN SATURATION: 100 % | SYSTOLIC BLOOD PRESSURE: 177 MMHG | DIASTOLIC BLOOD PRESSURE: 123 MMHG | HEART RATE: 63 BPM

## 2022-01-07 DIAGNOSIS — R06.2 WHEEZING: ICD-10-CM

## 2022-01-07 DIAGNOSIS — Z00.8 ENCOUNTER FOR OTHER GENERAL EXAMINATION: ICD-10-CM

## 2022-01-07 DIAGNOSIS — Z93.1 GASTROSTOMY STATUS: Chronic | ICD-10-CM

## 2022-01-07 PROCEDURE — 73562 X-RAY EXAM OF KNEE 3: CPT | Mod: 26,RT

## 2022-01-07 PROCEDURE — 99283 EMERGENCY DEPT VISIT LOW MDM: CPT

## 2022-01-07 PROCEDURE — 71046 X-RAY EXAM CHEST 2 VIEWS: CPT | Mod: 26

## 2022-01-07 PROCEDURE — 71046 X-RAY EXAM CHEST 2 VIEWS: CPT

## 2022-01-07 NOTE — ED PROVIDER NOTE - PATIENT PORTAL LINK FT
You can access the FollowMyHealth Patient Portal offered by [facility] by registering at the following website: [email or web address]. By joining [facility]’s FollowMyHealth portal, you will also be able to view your health information using other applications (apps) compatible with our system.

## 2022-01-07 NOTE — ED PROVIDER NOTE - NS ED ROS FT
From Aid:     CONSTITUTIONAL: No fevers, no chills, no lightheadedness, no dizziness  EYES: no visual changes, no eye pain  EARS: no ear drainage, no ear pain, no change in hearing  NOSE: + nasal congestion  MOUTH/THROAT: no sore throat  CV: No chest pain, no palpitations  RESP: No SOB, no cough  GI: No n/v/d, no abd pain  : no dysuria, no hematuria, no flank pain  MSK: see HPI   SKIN: no rashes  NEURO: no headache, no focal weakness, no decreased sensation/parasthesias   PSYCHIATRIC: no known mental health issues

## 2022-01-07 NOTE — ED PROVIDER NOTE - ATTENDING CONTRIBUTION TO CARE
Attending note:   After face to face evaluation of this patient, I concur with above noted hx, pe, and care plan for this patient.  Arreola: 56 yof with cerebral palsy, wheelchair bound. Pt noted by staff of group home to have bruise on right knee. No hx of trauma, no falls. Pt is always supervised and has no previous injury. Pt is able to complain if in pain as per staff at bedside. On exam, there is a 6cm area of ecchymosis few days old in appearance without edema or abrasion with no tenderness, Pt will not allow extension of either knee, no edema distally, no calf tn. able to sense pain distally.   Will get xray for possible trauma.

## 2022-01-07 NOTE — ED PROVIDER NOTE - NSFOLLOWUPINSTRUCTIONS_ED_ALL_ED_FT
Knee Pain  WHAT YOU NEED TO KNOW:  Knee pain may start suddenly, or it may be a long-term problem. You may have pain on the side, front, or back of your knee. You may have knee stiffness and swelling. You may hear popping sounds or feel like your knee is giving way or locking up as you walk. You may feel pain when you sit, stand, walk, or climb up and down stairs. Knee pain can be caused by conditions such as obesity, inflammation, or strains or tears in ligaments or tendons.   DISCHARGE INSTRUCTIONS:  Return to the emergency department if:   •Your pain is worse, even after treatment.   •You cannot bend or straighten your leg completely.   •The swelling around your knee does not go down even with treatment.  •Your knee is painful and hot to the touch.   Contact your healthcare provider if:   •You have questions or concerns about your condition or care.   Medicines: You may need any of the following:   •NSAIDs help decrease swelling and pain or fever. This medicine is available with or without a doctor's order. NSAIDs can cause stomach bleeding or kidney problems in certain people. If you take blood thinner medicine, always ask your healthcare provider if NSAIDs are safe for you. Always read the medicine label and follow directions.  •Acetaminophen decreases pain and fever. It is available without a doctor's order. Ask how much to take and how often to take it. Follow directions. Read the labels of all other medicines you are using to see if they also contain acetaminophen, or ask your doctor or pharmacist. Acetaminophen can cause liver damage if not taken correctly. Do not use more than 4 grams (4,000 milligrams) total of acetaminophen in one day.   •Prescription pain medicine may be given. Ask your healthcare provider how to take this medicine safely. Some prescription pain medicines contain acetaminophen. Do not take other medicines that contain acetaminophen without talking to your healthcare provider. Too much acetaminophen may cause liver damage. Prescription pain medicine may cause constipation. Ask your healthcare provider how to prevent or treat constipation.   •Take your medicine as directed. Contact your healthcare provider if you think your medicine is not helping or if you have side effects. Tell him or her if you are allergic to any medicine. Keep a list of the medicines, vitamins, and herbs you take. Include the amounts, and when and why you take them. Bring the list or the pill bottles to follow-up visits. Carry your medicine list with you in case of an emergency.  What you can do to manage your symptoms:   •Rest your knee so it can heal. Limit activities that increase your pain. Do low-impact exercises, such as walking or swimming.   •Apply ice to help reduce swelling and pain. Use an ice pack, or put crushed ice in a plastic bag. Cover it with a towel before you apply it to your knee. Apply ice for 15 to 20 minutes every hour, or as directed.  •Apply compression to help reduce swelling. Use a brace or bandage only as directed.  •Elevate your knee to help decrease pain and swelling. Elevate your knee while you are sitting or lying down. Prop your leg on pillows to keep your knee above the level of your heart.  •Prevent your knee from moving as directed. Your healthcare provider may put on a cast or splint. You may need to wear a leg brace to stabilize your knee. A leg brace can be adjusted to increase your range of motion as your knee heals.  Hinged Knee Braces    What you can do to prevent knee pain:   •Maintain a healthy weight. Extra weight increases your risk for knee pain. Ask your healthcare provider how much you should weigh. He or she can help you create a safe weight loss plan if you need to lose weight.  •Exercise or train properly. Use the correct equipment for sports. Wear shoes that provide good support. Check your posture often as you exercise, play sports, or train for an event. This can help prevent stress and strain on your knees. Rest between sessions so you do not overwork your knees.  Follow up with your healthcare provider within 24 hours or as directed: You may need follow-up treatments, such as steroid injections to decrease pain. Write down your questions so you remember to ask them during your visits.   Dolor de rodilla  LO QUE NECESITA SABER:  El dolor de rodilla puede empezar de forma repentina, o ser un problema a jackie plazo. Puede sentir dolor en la parte lateral, delantera o trasera de la rodilla. Puede tener la rodilla rígida e hinchada. Puede oír sonidos de chasquido o sentir que la rodilla cede o se le bloquea al andar. Puede sentir dolor cuando se sienta, se pone de pie, camina o sube y baja escaleras. El dolor de rodilla puede estar provocado por condiciones tim obesidad, inflamación, esguinces o desgarros de los ligamentos o los tendones.  INSTRUCCIONES SOBRE EL MATEUSZ HOSPITALARIA:  Regrese a la phillip de emergencias si:  •El dolor empeora, incluso después del tratamiento.  •No puede flexionar o enderezar la pierna completamente.  •La hinchazón alrededor de la rodilla no disminuye a pesar del tratamiento.  •La rodilla le duele y se nota caliente al tacto.  Comuníquese con awad médico si:  •Usted tiene preguntas o inquietudes acerca de awad condición o cuidado.  Medicamentos:Es posible que usted necesite alguno de los siguientes:   •Los GARY,pueden disminuir la inflamación y el dolor o la fiebre. Jenni medicamento está disponible con o sin quincy receta médica. Los GARY pueden causar sangrado estomacal o problemas renales en ciertas personas. Si usted papo un medicamento anticoagulante, siempre pregúntele a awad médico si los GARY son seguros para usted. Siempre kameron la etiqueta de jenni medicamento y siga las instrucciones.  •Acetaminofénalivia el dolor y baja la fiebre. Está disponible sin receta médica. Pregunte la cantidad y la frecuencia con que debe tomarlos. Siga las indicaciones. Kameron las etiquetas de todos los demás medicamentos que esté usando para saber si también contienen acetaminofén, o pregunte a awad médico o farmacéutico. El acetaminofén puede causar daño en el hígado cuando no se papo de forma correcta. No use más de 4 gramos (4000 miligramos) en total de acetaminofeno en un día.  •Puede administrarsepodrían administrarse. Pregunte al médico cómo debe bonilla jenni medicamento de forma waller. Algunos medicamentos recetados para el dolor contienen acetaminofén. No tome otros medicamentos que contengan acetaminofén sin consultarlo con awad médico. Demasiado acetaminofeno puede causar daño al hígado. Los medicamentos recetados para el dolor podrían causar estreñimiento. Pregunte a awad médico tim prevenir o tratar estreñimiento.  •[place] kallie medicamentos tim se le haya indicado.Consulte con awad médico si usted uzair que awad medicamento no le está ayudando o si presenta efectos secundarios. Infórmele si es alérgico a cualquier medicamento. Mantenga quincy lista actualizada de los medicamentos, las vitaminas y los productos herbales que papo. Incluya los siguientes datos de los medicamentos: cantidad, frecuencia y motivo de administración. Traiga con usted la lista o los envases de las píldoras a kallie citas de seguimiento. Lleve la lista de los medicamentos con usted en ji de quincy emergencia.  Lo que puede hacer para manejar los síntomas:  •Repose la rodilla para que se pueda curar.Limite las actividades que aumenten el dolor. Butch ejercicios de bajo impacto, tim caminar o nadar.  •Aplique hielo para ayudar a disminuir la inflamación y el dolor.Use quincy compresa de hielo o ponga hielo triturado en quincy bolsa de plástico. Cúbrala con quincy toalla antes de aplicarla sobre la herida. Aplique hielo tram 15 a 20 minutos por hora o según indicaciones.  •Aplique compresión para ayudar a reducir la inflamación.Use quincy férula o venda solamente si sigue las instrucciones del ji.  •Eleve la rodilla para ayudar a disminuir el dolor y la inflamación.Eleve la rodilla mientras esté sentado o acostado. Apoye la pierna sobre almohadones para mantener la rodilla por encima del corazón.  •Evite que la rodilla se mueva, tim se le haya indicado.Awad médico podría ponerle un yeso o férula. Usted podría necesitar de un yeso en awad pierna para estabilizar awad rodilla. El yeso en la pierna puede ajustarse para aumentar awad rango de movimiento a medida que awad rodilla nilam.  Rodillera articulada   Lo que puede hacer para prevenir el dolor de rodilla:  •Mantenga un peso saludable.El exceso de peso aumenta awad riesgo de sufrir dolor de rodilla. Consulte con awad médico cuánto debería pesar. Él puede ayudarlo a crear un plan de pérdida de peso seguro si usted tiene sobrepeso.  •Butch ejercicio o entrene correctamente.Utilice los aparatos adecuados para los deportes. Use zapatos que brinden un buen soporte. Verifique awad postura a menudo mientras hace ejercicio, juega deportes o entrena para un evento. [place] puede ayudar a prevenir el estrés y la tensión en las rodillas. Descanse entre sesiones para no esforzar excesivamente las rodillas.  Acuda en 24 horas a quincy tere de seguimiento con awad médico o tim se le indique:Quizá necesite tratamientos de seguimiento, tim inyecciones de esteroides para reducir el dolor. Anote kallie preguntas para que se acuerde de hacerlas tram kallie visitas.

## 2022-01-07 NOTE — ED PROVIDER NOTE - OBJECTIVE STATEMENT
57 y/o F with PMH cerebral palsy, intellectual disability, bipolar disorder, dysphagia presenting to ED with R knee bruise. Per patient's aid, found echymossis (R medial superior knee) today. No pain. No fever, chills or swelling. Patient has constant aid, does not ambulate. No concern of falls.

## 2022-01-07 NOTE — ED ADULT NURSE NOTE - INTERVENTIONS DEFINITIONS
Home to call system/Call bell, personal items and telephone within reach/Non-slip footwear when patient is off stretcher/Physically safe environment: no spills, clutter or unnecessary equipment/Stretcher in lowest position, wheels locked, appropriate side rails in place/Monitor for mental status changes and reorient to person, place, and time

## 2022-01-07 NOTE — ED PROVIDER NOTE - CLINICAL SUMMARY MEDICAL DECISION MAKING FREE TEXT BOX
56 yof with non tender ecchymosis on knee that does not appear brand new - will get xrays to evaluate.

## 2022-01-07 NOTE — ED ADULT TRIAGE NOTE - CHIEF COMPLAINT QUOTE
Pt wheelchair bound and non verbal from Group Home ([facility]) c/o bruising to right knee. As per aide they noticed bruise this afternoon. Pt not showing signs of pain when examined. Unable to obtain temp or bp i in triage.

## 2022-01-07 NOTE — ED PROVIDER NOTE - PHYSICAL EXAMINATION
General: Non verbal.   Head: Normocephalic and atraumatic.  Eyes: PERRLA with EOMI.  Neck: Supple. Trachea midline.   Cardiac: Normal S1 and S2 w/ RRR. No murmurs appreciated. \  Pulmonary: CTA  bilaterally. No increased WOB. No wheezes or crackles.  Abdominal: Soft, non-tender. (+) bowel sounds appreciated in all 4 quadrants. No hepatosplenomegaly. peg IN PLACE   Neurologic: unable to assess.   Musculoskeletal: R knee: red echymossis ~4 cm in diamter on R superior knee, medial. No ttp. No swelling. on passive motion, full rom. No bondy deromities. Pulses intact.   Skin: Color appropriate for race. Intact, warm, and well-perfused.  Lymphatic: No cervical or inguinal adenopathy appreciated.  Psychiatric:non verbal

## 2022-01-07 NOTE — ED ADULT NURSE NOTE - OBJECTIVE STATEMENT
pt received to room 24 accompanied by aidteto Resendiz. pt from group home, wheelchair bound, nonverbal at baseline. per aide, pt acting like her usual self. pt NPO has G-tube. presents with bruising to top of right knee. no known fall or injury. Astrid requesting patient remains in own wheelchair unless necessary to move her.

## 2022-01-18 ENCOUNTER — APPOINTMENT (OUTPATIENT)
Dept: UROLOGY | Facility: CLINIC | Age: 57
End: 2022-01-18
Payer: MEDICARE

## 2022-01-18 ENCOUNTER — OUTPATIENT (OUTPATIENT)
Dept: OUTPATIENT SERVICES | Facility: HOSPITAL | Age: 57
LOS: 1 days | End: 2022-01-18
Payer: MEDICARE

## 2022-01-18 DIAGNOSIS — Z93.1 GASTROSTOMY STATUS: Chronic | ICD-10-CM

## 2022-01-18 DIAGNOSIS — N39.0 URINARY TRACT INFECTION, SITE NOT SPECIFIED: ICD-10-CM

## 2022-01-18 PROCEDURE — 99213 OFFICE O/P EST LOW 20 MIN: CPT | Mod: 25,PD

## 2022-01-18 PROCEDURE — 51701 INSERT BLADDER CATHETER: CPT | Mod: PD

## 2022-01-18 PROCEDURE — 51701 INSERT BLADDER CATHETER: CPT

## 2022-01-18 RX ORDER — ALBUTEROL SULFATE 2.5 MG/3ML
(2.5 MG/3ML) SOLUTION RESPIRATORY (INHALATION)
Refills: 0 | Status: ACTIVE | COMMUNITY

## 2022-01-18 RX ORDER — MULTIVIT-MIN/IRON/FOLIC ACID/K 18-600-40
500-400 CAPSULE ORAL
Refills: 0 | Status: ACTIVE | COMMUNITY

## 2022-01-18 RX ORDER — ACETAMINOPHEN 500 MG/1
500 TABLET ORAL
Refills: 0 | Status: ACTIVE | COMMUNITY

## 2022-01-18 RX ORDER — LACOSAMIDE 200 MG/1
200 TABLET, FILM COATED ORAL
Refills: 0 | Status: ACTIVE | COMMUNITY

## 2022-01-18 RX ORDER — FERROUS SULFATE 325(65) MG
TABLET ORAL
Refills: 0 | Status: ACTIVE | COMMUNITY

## 2022-01-18 NOTE — PROGRESS NOTE ADULT - PROBLEM SELECTOR PROBLEM 3
PSYCHIATRY OUTPATIENT PROGRESS NOTE FOR [facility]    Patient  was seen today in real time via Video by the [facility]  Behavioral Health nurse/MA and Hailee Mayers M.D. clinician working from home location in [place].     Patient Name:  Tete Koroma   MRN#:  [id number]    Date of Session:  1/18/2022       Follow Up visit for medication management and psychotherapy/counseling    History:  The patient is a 22-year-old woman who is seen today for medication management follow-up for the treatment of depression, generalized anxiety, and problems with sleep.  The patient is seen with her mother Hector whom she lives.  She also lives with her boyfriend Chadwick.    Patient report:  Both the patient and her mom report that there has been a huge change in the patient's mood since our last visit in November 2021.  Tete states that she is more social, happier, more motivated and doing many more things around the house.  Tete also states that she has been working on her diabetes and her blood glucose levels have been in the 80s: She is very proud of this.  She states that anxiety is still somewhat of an issue and wonders if we could increase the  Lexapro as that has made such a big difference for her.    Substance use: Denies all  Occupational and social functioning: good  Sleep:adequate  Appetite:adequate      Medications:  Current Outpatient Medications   Medication Sig   • escitalopram (Lexapro) 20 MG tablet Take 1  1/2 tablet by mouth daily   • lamotrigine (LAMICTAL) 200 MG tablet Take 1 tablet by mouth daily.   • LORazepam (ATIVAN) 0.5 MG tablet Take 1 tablet by mouth 3 times daily.   • traZODone (DESYREL) 50 MG tablet Take 1 tablet by mouth nightly as needed for Sleep.   • Blood Glucose Monitoring Suppl w/Device Kit Use to check blood sugars 2 times daily. E11.65 One Touch Verio Flex   • lamoTRIgine (LaMICtal) 100 MG tablet Take 50 mg by mouth x7 days, then 100 mg by mouth x7 days, then 150 mg by mouth x7  days, then start 200 mg daily.   • Vitamin D, Ergocalciferol, 1.25 mg (50,000 units) capsule One tab weekly   • folic acid (FOLATE) 1 MG tablet Take 1 tablet by mouth daily.   • topiramate (TOPAMAX) 100 MG tablet 1/2 tab at supper for 6 nights then full tablet.   • rizatriptan (MAXALT) 10 MG tablet Take 1 tablet by mouth at onset of migraine. May repeat after 2 hours if needed.   • ondansetron (Zofran) 4 MG tablet One tab every 12 hours.   • pantoprazole (PROTONIX) 40 MG tablet Take 1 tablet by mouth daily. Take 30 minutes before a meal.   • metformin (GLUCOPHAGE) 1000 MG tablet Take 0.5 tablets by mouth daily (with dinner).   • blood glucose test strip Test blood sugar 2 times daily. Diagnosis: Type 2 diabetes with hyperosomality  Meter: One touch verioflex   • Cholecalciferol (Decara) 1.25 mg (50,000 units) capsule Take 1.25 mg by mouth 1 day a week.   • Zinc Gluconate 30 MG Tab Take 30 mg by mouth daily.   • albuterol 108 (90 Base) MCG/ACT inhaler Inhale 2 puffs into the lungs every 4 hours as needed for Shortness of Breath or Wheezing.   • budesonide-formoterol (Symbicort) 80-4.5 MCG/ACT inhaler Inhale 2 puffs into the lungs 2 times daily.     No current facility-administered medications for this visit.       Laboratory Tests or other studies:     none today      Mental Status Examination:    Appearance  [x] Unremarkable  []  Thin  [] Uncomfortable  [] Intoxicated  [] Other:    Hygiene  [x] Unremarkable  []  Marginal  []  Disheveled  []  Malodorous  []  Unkempt    []  Other:    Interpersonal behavior  []  Appropriate  [x]  Pleasant  [x]  Engaged  []  Guarded  []  Hostile  []  Withdrawn  []  Good eye contact  []  Avoidant eye contact  []  Other:    Speech Rate  [x] Normal  []   Fast  []   Slow  []   Pressured    Speech clarity  [x]   Clear  []   Dysarthric  []   Other:    Speech Volume  [x]   Soft  []   Loud  []   Normal    Speech Latency  [x]   Normal  []   Reduced  []   Prolonged    Mood  [x]   \"good\"  []   \"angry”  []   “sad”  [] \"depressed\"  []   \"anxious\"  [x]   \"worried\"  []   Not stated  []   Other:    Affect  []   Euthymic  []   Dysthymic  []   Anxious  []   Tense  []   Irritable  []   Sad    []   Tearful  [x]   Bright  []   Constricted  []   Blunted  []   Flat  []   Expansive  []   Euphoric  [x]   Congruent with stated mood  [] Not congruent with stated mood  []   Appropriate to situation and conversation  []   Inappropriate to situation or conversation  []   Stable  [] Labile  []   Fluid  []   Other:    Thought process  []   Linear  [x]   Logical  []   Well organized  []   Circumstantial  [] Tangential   []   Flight of ideas  []   Carson  []   Rigid  []   Difficult to follow   []   Disorganized  [] Other:    Thought Content  [x]  Unremarkable  []   Suspiciousness  []   Paranoia  []   Rumination   []  Self-critical  []   Catastrophizing  []   Grandiose  []   Delusional  []  Ideas of reference  []   Thought broadcasting or insertion  []  Obsessions or intrusive thoughts  []  Hopelessness  []  Somatic preoccupation  []  Other:    Perception/hallucinations  [x]  None reported or observed  []  Auditory hallucinations  [] Visual hallucinations  []  Second person  []  Command  []  Derogatory  []  Third person  []   Vague  []  Faint  []  Overpowering []  Grossly impaired or clouded sensorium    []  Other impairment:    Orientation, oriented to  [x]  Self  [x]  Place  [x]  Time  []  Situation  []  Other:  []  Not assessed    Attention and concentration  [x] No impairment noted in course of interview  []  Distractible  []  Difficulty sustaining or shifting attention  [] Assessed/screened as follows:    Memory  [x]  No impairment noted in course of interview as evidenced by recall of recent and distant events  []  Some impairment suspected from gaps in interview as follows:  []  Assessed/screened as follows:    Insight  [x]  Good as evidenced by awareness of illness  []  Fair as evidenced by awareness of illness, with  some limitations  []  Poor, with substantial limitations to awareness of or nature of illness  []  Other:    Judgment  [x]  Good as evidenced by reasonable decision making and interpersonal appropriateness  []  Fair, some limitations noted such as impulsivity or marked ambivalence  []  Poor, as follows:  []  Other:    Suicidal thoughts  [x]  Denies  []  Present, denies intent or plan  []  At baseline  []  Present, with some intent or plan  []  Other:    Homicidal/Assaultive Ideation   [x]  Denies  []  Other, specify:    Gait   []  Steady  []  Well-paced  []  Wide-space  []  Slow  []  Unsteady  []  Requires assistive device  [x]  Not assessed  []  Other:      Medical Multi-System Review of Symptoms:  A complete review of systems was conducted and is negative apart from as follows or as mentioned in HPI.    Medical Decision Making    Diagnoses:    Major depressive disorder, recurrent episode, moderate (CMS/HCC)  (primary encounter diagnosis)  Depression, major, recurrent, moderate (CMS/HCC)  ANH (generalized anxiety disorder)  Persistent insomnia    Time Spent With Patient: 22 minutes were spent with the patient for medication management and counseling.    Narrative assessment and plan:  The patient's affect has improved remarkably since our last session a month ago with the use of Lexapro 20 mg a day. We will now increase that to 30 mg a day to see if that will cover a little of her anxiety better.  She will continue taking Lamictal 200 mg q.day for mood stabilization, trazodone 50 mg q.h.s. for sleep and Ativan 0.5 mg t.i.d. for anxiety.    Risk Assessment: moderate risk of relapse    Follow up: 4 months    Hailee Mayers MD    Elevated LFTs

## 2022-01-19 ENCOUNTER — INPATIENT (INPATIENT)
Facility: HOSPITAL | Age: 57
LOS: 12 days | Discharge: NURSING, REHAB OR LONG-TERM CARE | DRG: 191 | End: 2022-02-01
Attending: STUDENT IN AN ORGANIZED HEALTH CARE EDUCATION/TRAINING PROGRAM | Admitting: HOSPITALIST
Payer: MEDICARE

## 2022-01-19 VITALS
OXYGEN SATURATION: 94 % | DIASTOLIC BLOOD PRESSURE: 50 MMHG | HEART RATE: 111 BPM | HEIGHT: 60 IN | RESPIRATION RATE: 18 BRPM | WEIGHT: 149.91 LBS | SYSTOLIC BLOOD PRESSURE: 100 MMHG | TEMPERATURE: 98 F

## 2022-01-19 DIAGNOSIS — R04.2 HEMOPTYSIS: ICD-10-CM

## 2022-01-19 DIAGNOSIS — Z93.1 GASTROSTOMY STATUS: Chronic | ICD-10-CM

## 2022-01-19 LAB
ALBUMIN SERPL ELPH-MCNC: 3.5 G/DL — SIGNIFICANT CHANGE UP (ref 3.3–5)
ALP SERPL-CCNC: 91 U/L — SIGNIFICANT CHANGE UP (ref 40–120)
ALT FLD-CCNC: 19 U/L — SIGNIFICANT CHANGE UP (ref 10–45)
ANION GAP SERPL CALC-SCNC: 11 MMOL/L — SIGNIFICANT CHANGE UP (ref 5–17)
APPEARANCE: CLEAR
AST SERPL-CCNC: 44 U/L — HIGH (ref 10–40)
BACTERIA UR CULT: NORMAL
BACTERIA: NEGATIVE
BASE EXCESS BLDV CALC-SCNC: 7.6 MMOL/L — HIGH (ref -2–2)
BASOPHILS # BLD AUTO: 0.02 K/UL — SIGNIFICANT CHANGE UP (ref 0–0.2)
BASOPHILS NFR BLD AUTO: 0.3 % — SIGNIFICANT CHANGE UP (ref 0–2)
BILIRUB SERPL-MCNC: 0.2 MG/DL — SIGNIFICANT CHANGE UP (ref 0.2–1.2)
BILIRUBIN URINE: NEGATIVE
BLD GP AB SCN SERPL QL: NEGATIVE — SIGNIFICANT CHANGE UP
BLOOD GAS VENOUS - CREATININE: SIGNIFICANT CHANGE UP MG/DL (ref 0.5–1.3)
BLOOD URINE: NORMAL
BUN SERPL-MCNC: 24 MG/DL — HIGH (ref 7–23)
CA-I SERPL-SCNC: 1.22 MMOL/L — SIGNIFICANT CHANGE UP (ref 1.15–1.33)
CALCIUM SERPL-MCNC: 9.3 MG/DL — SIGNIFICANT CHANGE UP (ref 8.4–10.5)
CHLORIDE BLDV-SCNC: 91 MMOL/L — LOW (ref 96–108)
CHLORIDE SERPL-SCNC: 88 MMOL/L — LOW (ref 96–108)
CO2 BLDV-SCNC: 37 MMOL/L — HIGH (ref 22–26)
CO2 SERPL-SCNC: 29 MMOL/L — SIGNIFICANT CHANGE UP (ref 22–31)
COLOR: YELLOW
CREAT SERPL-MCNC: 0.56 MG/DL — SIGNIFICANT CHANGE UP (ref 0.5–1.3)
EOSINOPHIL # BLD AUTO: 0.1 K/UL — SIGNIFICANT CHANGE UP (ref 0–0.5)
EOSINOPHIL NFR BLD AUTO: 1.3 % — SIGNIFICANT CHANGE UP (ref 0–6)
GAS PNL BLDV: 125 MMOL/L — LOW (ref 136–145)
GAS PNL BLDV: SIGNIFICANT CHANGE UP
GAS PNL BLDV: SIGNIFICANT CHANGE UP
GLUCOSE BLDV-MCNC: 86 MG/DL — SIGNIFICANT CHANGE UP (ref 70–99)
GLUCOSE QUALITATIVE U: NEGATIVE
GLUCOSE SERPL-MCNC: 81 MG/DL — SIGNIFICANT CHANGE UP (ref 70–99)
HCO3 BLDV-SCNC: 35 MMOL/L — HIGH (ref 22–29)
HCT VFR BLD CALC: 30.1 % — LOW (ref 34.5–45)
HCT VFR BLDA CALC: 36 % — SIGNIFICANT CHANGE UP (ref 34.5–46.5)
HGB BLD CALC-MCNC: 12.1 G/DL — SIGNIFICANT CHANGE UP (ref 11.7–16.1)
HGB BLD-MCNC: 9.9 G/DL — LOW (ref 11.5–15.5)
HYALINE CASTS: 0 /LPF
IMM GRANULOCYTES NFR BLD AUTO: 0.9 % — SIGNIFICANT CHANGE UP (ref 0–1.5)
KETONES URINE: NEGATIVE
LACTATE BLDV-MCNC: 2.3 MMOL/L — HIGH (ref 0.7–2)
LEUKOCYTE ESTERASE URINE: NEGATIVE
LYMPHOCYTES # BLD AUTO: 0.95 K/UL — LOW (ref 1–3.3)
LYMPHOCYTES # BLD AUTO: 12.5 % — LOW (ref 13–44)
MCHC RBC-ENTMCNC: 32.9 GM/DL — SIGNIFICANT CHANGE UP (ref 32–36)
MCHC RBC-ENTMCNC: 34.7 PG — HIGH (ref 27–34)
MCV RBC AUTO: 105.6 FL — HIGH (ref 80–100)
MICROSCOPIC-UA: NORMAL
MONOCYTES # BLD AUTO: 1.42 K/UL — HIGH (ref 0–0.9)
MONOCYTES NFR BLD AUTO: 18.7 % — HIGH (ref 2–14)
NEUTROPHILS # BLD AUTO: 5.02 K/UL — SIGNIFICANT CHANGE UP (ref 1.8–7.4)
NEUTROPHILS NFR BLD AUTO: 66.3 % — SIGNIFICANT CHANGE UP (ref 43–77)
NITRITE URINE: NEGATIVE
NRBC # BLD: 0 /100 WBCS — SIGNIFICANT CHANGE UP (ref 0–0)
PCO2 BLDV: 62 MMHG — HIGH (ref 39–42)
PH BLDV: 7.36 — SIGNIFICANT CHANGE UP (ref 7.32–7.43)
PH URINE: 7.5
PLATELET # BLD AUTO: SIGNIFICANT CHANGE UP K/UL (ref 150–400)
PO2 BLDV: 51 MMHG — HIGH (ref 25–45)
POTASSIUM BLDV-SCNC: 5.9 MMOL/L — HIGH (ref 3.5–5.1)
POTASSIUM SERPL-MCNC: 5.2 MMOL/L — SIGNIFICANT CHANGE UP (ref 3.5–5.3)
POTASSIUM SERPL-SCNC: 5.2 MMOL/L — SIGNIFICANT CHANGE UP (ref 3.5–5.3)
PROT SERPL-MCNC: 7.5 G/DL — SIGNIFICANT CHANGE UP (ref 6–8.3)
PROTEIN URINE: NEGATIVE
RAPID RVP RESULT: SIGNIFICANT CHANGE UP
RBC # BLD: 2.85 M/UL — LOW (ref 3.8–5.2)
RBC # FLD: 14.6 % — HIGH (ref 10.3–14.5)
RED BLOOD CELLS URINE: 50 /HPF
RH IG SCN BLD-IMP: NEGATIVE — SIGNIFICANT CHANGE UP
SAO2 % BLDV: 84 % — SIGNIFICANT CHANGE UP (ref 67–88)
SARS-COV-2 RNA SPEC QL NAA+PROBE: SIGNIFICANT CHANGE UP
SODIUM SERPL-SCNC: 128 MMOL/L — LOW (ref 135–145)
SPECIFIC GRAVITY URINE: 1.02
SQUAMOUS EPITHELIAL CELLS: 1 /HPF
UROBILINOGEN URINE: NORMAL
WBC # BLD: 7.58 K/UL — SIGNIFICANT CHANGE UP (ref 3.8–10.5)
WBC # FLD AUTO: 7.58 K/UL — SIGNIFICANT CHANGE UP (ref 3.8–10.5)
WHITE BLOOD CELLS URINE: 1 /HPF

## 2022-01-19 PROCEDURE — 99285 EMERGENCY DEPT VISIT HI MDM: CPT | Mod: GC

## 2022-01-19 PROCEDURE — 99223 1ST HOSP IP/OBS HIGH 75: CPT

## 2022-01-19 PROCEDURE — 71045 X-RAY EXAM CHEST 1 VIEW: CPT | Mod: 26

## 2022-01-19 PROCEDURE — 74176 CT ABD & PELVIS W/O CONTRAST: CPT | Mod: 26,MA

## 2022-01-19 RX ORDER — IPRATROPIUM/ALBUTEROL SULFATE 18-103MCG
3 AEROSOL WITH ADAPTER (GRAM) INHALATION EVERY 4 HOURS
Refills: 0 | Status: DISCONTINUED | OUTPATIENT
Start: 2022-01-19 | End: 2022-02-01

## 2022-01-19 RX ORDER — SODIUM CHLORIDE 9 MG/ML
3 INJECTION INTRAMUSCULAR; INTRAVENOUS; SUBCUTANEOUS
Refills: 0 | Status: DISCONTINUED | OUTPATIENT
Start: 2022-01-19 | End: 2022-02-01

## 2022-01-19 RX ORDER — ONDANSETRON 8 MG/1
4 TABLET, FILM COATED ORAL EVERY 8 HOURS
Refills: 0 | Status: DISCONTINUED | OUTPATIENT
Start: 2022-01-19 | End: 2022-02-01

## 2022-01-19 RX ORDER — ALBUTEROL 90 UG/1
2.5 AEROSOL, METERED ORAL ONCE
Refills: 0 | Status: COMPLETED | OUTPATIENT
Start: 2022-01-19 | End: 2022-01-19

## 2022-01-19 RX ORDER — SODIUM CHLORIDE 9 MG/ML
1000 INJECTION INTRAMUSCULAR; INTRAVENOUS; SUBCUTANEOUS
Refills: 0 | Status: DISCONTINUED | OUTPATIENT
Start: 2022-01-19 | End: 2022-01-21

## 2022-01-19 RX ADMIN — ALBUTEROL 2.5 MILLIGRAM(S): 90 AEROSOL, METERED ORAL at 18:30

## 2022-01-19 NOTE — HISTORY OF PRESENT ILLNESS
[FreeTextEntry1] : Mariah Ponce returns to the office today.  She is a 56-year-old woman with history of seizure disorder who was not verbally communicative and lives in a group home.  She is here today with an aide from the home reporting that she has had some intermittent foul odor of the urine and they are concerned about a possible urinary tract infection.  A culture has not yet been obtained.  The patient has no prior stone history and there has been no hematuria.  There have been no fevers or chills.  This intermittent foul odor of the urine has been ongoing for about the last 6 months per the aide with the patient today.  The patient herself cannot provide history.\par \davida Card [phone]

## 2022-01-19 NOTE — H&P ADULT - ASSESSMENT
56F PMH Cerebral palsy(non-verbal(moans), bedbound at baseline),profound intellectual disability seizure d/o, constipation multiple admissions for aspiration PNA(pseudomonas& MRSA in sputum) requiring intubation s/p PEG presenting with worsening cough and hemoptysis.

## 2022-01-19 NOTE — H&P ADULT - HISTORY OF PRESENT ILLNESS
HPI:56F PMH Cerebral palsy(non-verbal(moans), bedbound at baseline),intellectual disability seizure d/o, multiple admissions for aspiration PNA(pseudomonas& MRSA in sputum) requiring intubation s/p PEG presenting with worsening cough and hemoptysis. History obtained by aide at bedside. She states pt with chronic cough that has been getting worse and productive of thick phlegm that had streaks of blood noticed today. Reports pt appears to have chest congestion with a rattling noise worse when she coughs and has been wheezing more of late requiring multiple nebulizer treatments. She states pt seems unable to clear her secretions and has no suctioning device at home. She also states pts abdomen has been notably distended and had a few episodes of nbnb emesis attributed to excessive tube feeds. No reports of fevers, increased moaning d/t chest pain, abdominal pain, hematemesis, melena/hematochezia, hematuria.    ED course: Afebrile, BP:100/50->112/100  HR:111->91 O2:94% on 15L NRB-> 100% on 4L NC. Given Nebulizer treatment x1   56F PMH Cerebral palsy(non-verbal(moans), bedbound at baseline),profound intellectual disability seizure d/o, constipation multiple admissions for aspiration PNA(pseudomonas& MRSA in sputum) requiring intubation s/p PEG presenting with worsening cough and hemoptysis. History obtained by aide at bedside. She states pt with chronic cough that has been getting worse and productive of thick phlegm that had streaks of blood noticed today. Reports pt appears to have chest congestion with a rattling noise worse when she coughs and has been wheezing more of late requiring multiple nebulizer treatments. She states pt seems unable to clear her secretions and has no suctioning device at home. She also states pts abdomen has been notably distended and had a few episodes of nbnb emesis attributed to excessive tube feeds. No reports of fevers, increased moaning d/t chest pain, abdominal pain, hematemesis, melena/hematochezia, hematuria.    ED course: Afebrile, BP:100/50->112/100  HR:111->91 O2:94% on 15L NRB-> 100% on 4L NC. Given Nebulizer treatment x1

## 2022-01-19 NOTE — ED ADULT NURSE REASSESSMENT NOTE - NS ED NURSE REASSESS COMMENT FT1
Report received from RN Cyndi. Pt resting in stretcher with aide at bedside, pt a&ox0, nonverbal, nad, breathing spontaneous and nonlabored, wet audible breath sounds noted MD aware, pt received breathing treatment. Per daytime RN, unable to obtain IV access, MD Patrick to place US guided IV. Pt on CM. Pt safety and comfort provided. Report received from RN Cyndi. Pt resting in stretcher with aide at bedside, pt a&ox0, nonverbal, nad, breathing spontaneous and nonlabored, abdomen distended and firm, wet audible breath sounds noted MD aware, pt received breathing treatment. Per daytime RN, unable to obtain IV access, MD Patrick to place US guided IV. Pt on CM. Pt safety and comfort provided.

## 2022-01-19 NOTE — H&P ADULT - PROBLEM SELECTOR PLAN 2
Unknown etiology at this time;   -significant drop in Hb-9.9<-13.9(12/21/21)  -will obtain CTA of chest and CT A/P with IV con  -f/u FOBT  -maintain active T&S  -Transfuse if Hb<7  -f/u iron studies, Vitamin b12 and folate

## 2022-01-19 NOTE — H&P ADULT - PROBLEM SELECTOR PLAN 6
-Diet: NPO  -DVT ppx: HSQ -f/u CT A/P to evaluate patency of PEG  -f/u in AM with nutrition with regards to feeds

## 2022-01-19 NOTE — ED PROVIDER NOTE - OBJECTIVE STATEMENT
57 yo female with pmhx Cerebral palsy, intellectual disability, presenting with worsening cough with blood. Has had cough for months with congestion, but worsening recently at living facility, noted to have streaks of blood. Brought to ED.    Denies CP, SOB, N/V/D, fevers.

## 2022-01-19 NOTE — ASSESSMENT
[FreeTextEntry1] : A catheterized urine sample was collected today for culture.  It is unclear whether or not the odor is a vaginal issue or possibly related to a urinary tract infection.  The culture should give us answers to this and I will give her antibiotics if appropriate based on those culture results.

## 2022-01-19 NOTE — H&P ADULT - PROBLEM SELECTOR PLAN 1
sputum notably with streaks of blood per picture shown by aide  -could be bronchiectasis given h/o pseudomonas aspiration PNA vs chronic bronchitis vs less likely PE   -will obtain CTA to r/o PE and evaluate for bronchiectatic lung disease  -will obtain FOBT to r/o GI source given drop in Hb as well  -c/w Duonebs and saline nebulizer(mucolytic)  -chest PT  -f/u sputum Cx  -consider pulm consult if significant findings on CTA

## 2022-01-19 NOTE — H&P ADULT - NSICDXFAMILYHX_GEN_ALL_CORE_FT
FAMILY HISTORY:  No pertinent family history in first degree relatives, unable to obtain d/t baseline intellectual disability     FAMILY HISTORY:  Family history unknown, unable to obtain d/t baseline intellectual disability

## 2022-01-19 NOTE — ED ADULT NURSE REASSESSMENT NOTE - NS ED NURSE REASSESS COMMENT FT1
[address] as ordered and pt has aide that stays with the pt while here for her stay Luis Daniel

## 2022-01-19 NOTE — H&P ADULT - NSHPSOCIALHISTORY_GEN_ALL_CORE
Lives in private home with 24h aides provided by [facility]   Unable to obtain rest of SH d/t baseline intellectual disability Lives in private home with 24h aides provided by [facility]   Bedbound at baseline  Unable to obtain rest of SH d/t baseline intellectual disability

## 2022-01-19 NOTE — ED ADULT NURSE NOTE - NSIMPLEMENTINTERV_GEN_ALL_ED
Implemented All Fall Risk Interventions:  [place] to call system. Call bell, personal items and telephone within reach. Instruct patient to call for assistance. Room bathroom lighting operational. Non-slip footwear when patient is off stretcher. Physically safe environment: no spills, clutter or unnecessary equipment. Stretcher in lowest position, wheels locked, appropriate side rails in place. Provide visual cue, wrist band, yellow gown, etc. Monitor gait and stability. Monitor for mental status changes and reorient to person, place, and time. Review medications for side effects contributing to fall risk. Reinforce activity limits and safety measures with patient and family.

## 2022-01-19 NOTE — ED ADULT NURSE REASSESSMENT NOTE - NS ED NURSE REASSESS COMMENT FT1
Pt suctioned Pt suctioned white sputum, no blood noted at this time, MD aware Chest PT performed on pt. Pt suctioned white sputum, no blood noted at this time, MD aware.

## 2022-01-19 NOTE — ED ADULT NURSE NOTE - OBJECTIVE STATEMENT
56 year old female from group home with aide at bedside BIBA for coughing and blood tinged sputum Pt awake non verbal unable to communicate HX of CP pt is a bed bound pt Skin intact Pt obese  with a peg tube Pt room air pox 90-92 % and placed on 4L nc.  No fever noted and pt unable to c/o of anything Inable to obtain iv MD aware bloods sent as ordered Luz Mariaorn

## 2022-01-19 NOTE — H&P ADULT - REASON FOR ADMISSION
standing B Hip ext x 15 hold 5 sec   Exercise 5: seated HS stretch 30 sec x 3   Exercise 6: standing march BUE light support on rail x 15 hold 5 sec    Exercise 7: sit<>stand x 10 retropelling difficulty with initial static stand. Exercise 8: standing at rail reaching and placing cones within VANESSA at waist level below and retrieving cone from floor SBA/ CGA x 1 retropelling and slightly squats to compensate for balance. Exercise 9: seated B ankle DF YTB x 10 hold 3 sec   Exercise 11: minisquat x 10   Exercise 12: forward/retro ambulation uneven surface 5 ft one UE support x 5 trials SBA   Exercise 13: standing hand in hand rachell/retro WS  x 10-15   Exercise 16: standing even surface narrow VANESSA 3 sec at best then reaches for rail. Exercise 17: standing wide VANESSA 30 sec even surface, uneven surface 5 sec at best 1 fingertip support >30 sec   Exercise 18: recumbant bike x 6 min no inc in pain able to hold conversation. Assessment:   Conditions Requiring Skilled Therapeutic Intervention  Body structures, Functions, Activity limitations: Decreased functional mobility ; Decreased endurance;Decreased balance;Decreased strength  Assessment: Pt. continues to be limited with endurance unable to advance ambulation due to fatiugue. Foot slap continues around 120' . Pt. able to ride recumbant bike x 6 min this date with no difficulty with feet in stirrups (tightened down). Post demos fatigue and difficulty with initial static standing balance and propriocaptive. Incorporated proprioceptive activites this date at rail and activities on uneven surface. Difficulty to maintain staitc stand > 5 sec on uneven surface prior to reaching or rail. Pt. also able to progress with inc reps of BLE strength. Issued YTB and resisted ankle DF to HEP.     Treatment Diagnosis: Lumbar Stenosis with neurogenic claudication postlumbar surgery with bone spur removal on 3/27/20  Prognosis:
Worsening productive cough, hemoptysis

## 2022-01-19 NOTE — H&P ADULT - NSHPPHYSICALEXAM_GEN_ALL_CORE
Vital Signs Last 24 Hrs  T(C): 36.7 (19 Jan 2022 19:21), Max: 36.7 (19 Jan 2022 16:38)  T(F): 98 (19 Jan 2022 19:21), Max: 98.1 (19 Jan 2022 16:38)  HR: 90 (19 Jan 2022 22:39) (90 - 111)  BP: 160/104 (19 Jan 2022 22:39) (100/50 - 160/104)  BP(mean): --  RR: 19 (19 Jan 2022 22:39) (18 - 23)  SpO2: 97% (19 Jan 2022 22:39) (92% - 100%)

## 2022-01-19 NOTE — ED PROVIDER NOTE - ATTENDING CONTRIBUTION TO CARE
I performed a history and physical exam of the patient and discussed their management with the resident and /or advanced care provider. I reviewed the resident and /or ACP's note and agree with the documented findings and plan of care. My medical decision making and observations are found above.  Lungs scattered rhonchi, coughing occasionally. no active bleeding in mouth

## 2022-01-19 NOTE — ED PROVIDER NOTE - CLINICAL SUMMARY MEDICAL DECISION MAKING FREE TEXT BOX
Thien: non verbal, coughed up blood today. will eval for infection, abd pathology and observe. will check labs and hct. Iff all neg might be able to go back to her facility.

## 2022-01-19 NOTE — ED ADULT NURSE REASSESSMENT NOTE - NS ED NURSE REASSESS COMMENT FT1
1804 Unable to obtain IV attempted 2 times and 2 times by second RN bloods sent as ordered Luis Daniel

## 2022-01-19 NOTE — PHYSICAL EXAM
[General Appearance - In No Acute Distress] : no acute distress [] : no respiratory distress [Exaggerated Use Of Accessory Muscles For Inspiration] : no accessory muscle use [FreeTextEntry1] : wheelchair bound

## 2022-01-20 DIAGNOSIS — R04.2 HEMOPTYSIS: ICD-10-CM

## 2022-01-20 DIAGNOSIS — Z29.9 ENCOUNTER FOR PROPHYLACTIC MEASURES, UNSPECIFIED: ICD-10-CM

## 2022-01-20 DIAGNOSIS — D64.9 ANEMIA, UNSPECIFIED: ICD-10-CM

## 2022-01-20 DIAGNOSIS — K59.00 CONSTIPATION, UNSPECIFIED: ICD-10-CM

## 2022-01-20 DIAGNOSIS — Z93.1 GASTROSTOMY STATUS: ICD-10-CM

## 2022-01-20 DIAGNOSIS — G40.909 EPILEPSY, UNSPECIFIED, NOT INTRACTABLE, WITHOUT STATUS EPILEPTICUS: ICD-10-CM

## 2022-01-20 DIAGNOSIS — G80.9 CEREBRAL PALSY, UNSPECIFIED: ICD-10-CM

## 2022-01-20 LAB
ANION GAP SERPL CALC-SCNC: 10 MMOL/L — SIGNIFICANT CHANGE UP (ref 5–17)
APTT BLD: 39.6 SEC — HIGH (ref 27.5–35.5)
BUN SERPL-MCNC: 19 MG/DL — SIGNIFICANT CHANGE UP (ref 7–23)
CALCIUM SERPL-MCNC: 9.6 MG/DL — SIGNIFICANT CHANGE UP (ref 8.4–10.5)
CHLORIDE SERPL-SCNC: 94 MMOL/L — LOW (ref 96–108)
CO2 SERPL-SCNC: 31 MMOL/L — SIGNIFICANT CHANGE UP (ref 22–31)
CREAT SERPL-MCNC: 0.54 MG/DL — SIGNIFICANT CHANGE UP (ref 0.5–1.3)
FERRITIN SERPL-MCNC: 268 NG/ML — HIGH (ref 15–150)
FOLATE SERPL-MCNC: >20 NG/ML — SIGNIFICANT CHANGE UP
GLUCOSE BLDC GLUCOMTR-MCNC: 91 MG/DL — SIGNIFICANT CHANGE UP (ref 70–99)
GLUCOSE SERPL-MCNC: 90 MG/DL — SIGNIFICANT CHANGE UP (ref 70–99)
HCT VFR BLD CALC: 38.6 % — SIGNIFICANT CHANGE UP (ref 34.5–45)
HCT VFR BLD CALC: 38.6 % — SIGNIFICANT CHANGE UP (ref 34.5–45)
HGB BLD-MCNC: 12.8 G/DL — SIGNIFICANT CHANGE UP (ref 11.5–15.5)
HGB BLD-MCNC: 12.8 G/DL — SIGNIFICANT CHANGE UP (ref 11.5–15.5)
INR BLD: 0.97 RATIO — SIGNIFICANT CHANGE UP (ref 0.88–1.16)
IRON SATN MFR SERPL: 20 % — SIGNIFICANT CHANGE UP (ref 14–50)
IRON SATN MFR SERPL: 64 UG/DL — SIGNIFICANT CHANGE UP (ref 30–160)
MAGNESIUM SERPL-MCNC: 1.8 MG/DL — SIGNIFICANT CHANGE UP (ref 1.6–2.6)
MCHC RBC-ENTMCNC: 33.2 GM/DL — SIGNIFICANT CHANGE UP (ref 32–36)
MCHC RBC-ENTMCNC: 33.2 GM/DL — SIGNIFICANT CHANGE UP (ref 32–36)
MCHC RBC-ENTMCNC: 35 PG — HIGH (ref 27–34)
MCHC RBC-ENTMCNC: 35.2 PG — HIGH (ref 27–34)
MCV RBC AUTO: 105.5 FL — HIGH (ref 80–100)
MCV RBC AUTO: 106 FL — HIGH (ref 80–100)
NRBC # BLD: 0 /100 WBCS — SIGNIFICANT CHANGE UP (ref 0–0)
NRBC # BLD: 0 /100 WBCS — SIGNIFICANT CHANGE UP (ref 0–0)
PHOSPHATE SERPL-MCNC: 5.4 MG/DL — HIGH (ref 2.5–4.5)
PLATELET # BLD AUTO: 114 K/UL — LOW (ref 150–400)
PLATELET # BLD AUTO: 117 K/UL — LOW (ref 150–400)
POTASSIUM SERPL-MCNC: 4.5 MMOL/L — SIGNIFICANT CHANGE UP (ref 3.5–5.3)
POTASSIUM SERPL-SCNC: 4.5 MMOL/L — SIGNIFICANT CHANGE UP (ref 3.5–5.3)
PROCALCITONIN SERPL-MCNC: 0.14 NG/ML — HIGH (ref 0.02–0.1)
PROTHROM AB SERPL-ACNC: 11.7 SEC — SIGNIFICANT CHANGE UP (ref 10.6–13.6)
RBC # BLD: 3.64 M/UL — LOW (ref 3.8–5.2)
RBC # BLD: 3.66 M/UL — LOW (ref 3.8–5.2)
RBC # FLD: 14.7 % — HIGH (ref 10.3–14.5)
RBC # FLD: 14.7 % — HIGH (ref 10.3–14.5)
SODIUM SERPL-SCNC: 135 MMOL/L — SIGNIFICANT CHANGE UP (ref 135–145)
TIBC SERPL-MCNC: 312 UG/DL — SIGNIFICANT CHANGE UP (ref 220–430)
TSH SERPL-MCNC: 10.2 UIU/ML — HIGH (ref 0.27–4.2)
UIBC SERPL-MCNC: 248 UG/DL — SIGNIFICANT CHANGE UP (ref 110–370)
VALPROATE SERPL-MCNC: 47 UG/ML — LOW (ref 50–100)
VIT B12 SERPL-MCNC: >2000 PG/ML — HIGH (ref 232–1245)
WBC # BLD: 6.61 K/UL — SIGNIFICANT CHANGE UP (ref 3.8–10.5)
WBC # BLD: 7.72 K/UL — SIGNIFICANT CHANGE UP (ref 3.8–10.5)
WBC # FLD AUTO: 6.61 K/UL — SIGNIFICANT CHANGE UP (ref 3.8–10.5)
WBC # FLD AUTO: 7.72 K/UL — SIGNIFICANT CHANGE UP (ref 3.8–10.5)

## 2022-01-20 PROCEDURE — 99233 SBSQ HOSP IP/OBS HIGH 50: CPT

## 2022-01-20 PROCEDURE — 71275 CT ANGIOGRAPHY CHEST: CPT | Mod: 26

## 2022-01-20 PROCEDURE — 74177 CT ABD & PELVIS W/CONTRAST: CPT | Mod: 26

## 2022-01-20 RX ORDER — LEVETIRACETAM 250 MG/1
600 TABLET, FILM COATED ORAL
Refills: 0 | Status: DISCONTINUED | OUTPATIENT
Start: 2022-01-20 | End: 2022-01-29

## 2022-01-20 RX ORDER — VALPROIC ACID (AS SODIUM SALT) 250 MG/5ML
750 SOLUTION, ORAL ORAL THREE TIMES A DAY
Refills: 0 | Status: DISCONTINUED | OUTPATIENT
Start: 2022-01-20 | End: 2022-01-29

## 2022-01-20 RX ORDER — BUDESONIDE, MICRONIZED 100 %
0.5 POWDER (GRAM) MISCELLANEOUS
Refills: 0 | Status: DISCONTINUED | OUTPATIENT
Start: 2022-01-20 | End: 2022-02-01

## 2022-01-20 RX ORDER — SALICYLIC ACID 0.5 %
1 CLEANSER (GRAM) TOPICAL DAILY
Refills: 0 | Status: DISCONTINUED | OUTPATIENT
Start: 2022-01-20 | End: 2022-02-01

## 2022-01-20 RX ORDER — MAGNESIUM HYDROXIDE 400 MG/1
30 TABLET, CHEWABLE ORAL DAILY
Refills: 0 | Status: DISCONTINUED | OUTPATIENT
Start: 2022-01-20 | End: 2022-02-01

## 2022-01-20 RX ORDER — HEPARIN SODIUM 5000 [USP'U]/ML
5000 INJECTION INTRAVENOUS; SUBCUTANEOUS EVERY 8 HOURS
Refills: 0 | Status: DISCONTINUED | OUTPATIENT
Start: 2022-01-20 | End: 2022-01-25

## 2022-01-20 RX ORDER — LANOLIN/MINERAL OIL
1 LOTION (ML) TOPICAL
Refills: 0 | Status: DISCONTINUED | OUTPATIENT
Start: 2022-01-20 | End: 2022-02-01

## 2022-01-20 RX ORDER — CALCIUM CARBONATE 500(1250)
1 TABLET ORAL DAILY
Refills: 0 | Status: DISCONTINUED | OUTPATIENT
Start: 2022-01-20 | End: 2022-02-01

## 2022-01-20 RX ORDER — LACOSAMIDE 50 MG/1
200 TABLET ORAL
Refills: 0 | Status: DISCONTINUED | OUTPATIENT
Start: 2022-01-20 | End: 2022-01-29

## 2022-01-20 RX ORDER — FERROUS SULFATE 325(65) MG
220 TABLET ORAL AT BEDTIME
Refills: 0 | Status: DISCONTINUED | OUTPATIENT
Start: 2022-01-20 | End: 2022-02-01

## 2022-01-20 RX ORDER — MULTIVIT-MIN/FERROUS GLUCONATE 9 MG/15 ML
1 LIQUID (ML) ORAL DAILY
Refills: 0 | Status: DISCONTINUED | OUTPATIENT
Start: 2022-01-20 | End: 2022-01-28

## 2022-01-20 RX ADMIN — Medication 1 TABLET(S): at 15:30

## 2022-01-20 RX ADMIN — Medication 3 MILLILITER(S): at 01:32

## 2022-01-20 RX ADMIN — SODIUM CHLORIDE 3 MILLILITER(S): 9 INJECTION INTRAMUSCULAR; INTRAVENOUS; SUBCUTANEOUS at 07:50

## 2022-01-20 RX ADMIN — Medication 220 MILLIGRAM(S): at 15:31

## 2022-01-20 RX ADMIN — HEPARIN SODIUM 5000 UNIT(S): 5000 INJECTION INTRAVENOUS; SUBCUTANEOUS at 21:47

## 2022-01-20 RX ADMIN — Medication 750 MILLIGRAM(S): at 06:14

## 2022-01-20 RX ADMIN — LACOSAMIDE 200 MILLIGRAM(S): 50 TABLET ORAL at 21:46

## 2022-01-20 RX ADMIN — Medication 750 MILLIGRAM(S): at 15:30

## 2022-01-20 RX ADMIN — SODIUM CHLORIDE 3 MILLILITER(S): 9 INJECTION INTRAMUSCULAR; INTRAVENOUS; SUBCUTANEOUS at 17:01

## 2022-01-20 RX ADMIN — LACOSAMIDE 200 MILLIGRAM(S): 50 TABLET ORAL at 07:47

## 2022-01-20 RX ADMIN — HEPARIN SODIUM 5000 UNIT(S): 5000 INJECTION INTRAVENOUS; SUBCUTANEOUS at 06:14

## 2022-01-20 RX ADMIN — Medication 3 MILLILITER(S): at 06:14

## 2022-01-20 RX ADMIN — SODIUM CHLORIDE 100 MILLILITER(S): 9 INJECTION INTRAMUSCULAR; INTRAVENOUS; SUBCUTANEOUS at 02:55

## 2022-01-20 RX ADMIN — LEVETIRACETAM 600 MILLIGRAM(S): 250 TABLET, FILM COATED ORAL at 06:15

## 2022-01-20 RX ADMIN — Medication 3 MILLILITER(S): at 17:02

## 2022-01-20 RX ADMIN — Medication 3 MILLILITER(S): at 23:39

## 2022-01-20 RX ADMIN — LEVETIRACETAM 600 MILLIGRAM(S): 250 TABLET, FILM COATED ORAL at 17:01

## 2022-01-20 RX ADMIN — HEPARIN SODIUM 5000 UNIT(S): 5000 INJECTION INTRAVENOUS; SUBCUTANEOUS at 13:54

## 2022-01-20 RX ADMIN — Medication 750 MILLIGRAM(S): at 21:47

## 2022-01-20 RX ADMIN — Medication 3 MILLILITER(S): at 13:32

## 2022-01-20 RX ADMIN — Medication 1 APPLICATION(S): at 17:24

## 2022-01-20 RX ADMIN — Medication 0.5 MILLIGRAM(S): at 17:26

## 2022-01-20 NOTE — ED ADULT NURSE REASSESSMENT NOTE - NS ED NURSE REASSESS COMMENT FT1
admitting team contacted and made aware they need to come down and push the contrast through pt peg tube for CT scan

## 2022-01-20 NOTE — ED ADULT NURSE REASSESSMENT NOTE - NS ED NURSE REASSESS COMMENT FT1
Pt cleaned, changed, and placed in position of comfort Pt cleaned, changed, and placed in position of comfort. Chest PT performed

## 2022-01-20 NOTE — PROGRESS NOTE ADULT - PROBLEM SELECTOR PLAN 2
-CT A/P with Questionable retraction of the percutaneous gastrostomy tube into the abdominal wall versus tenting of the stomach. Developing buried bumper syndrome is not excluded.  -GI eval

## 2022-01-20 NOTE — PROGRESS NOTE ADULT - SUBJECTIVE AND OBJECTIVE BOX
Patient is a 56y old  Female who presents with a chief complaint of Worsening productive cough, hemoptysis (19 Jan 2022 23:12)      SUBJECTIVE / OVERNIGHT EVENTS: appears somewhat agitated     MEDICATIONS  (STANDING):  albuterol/ipratropium for Nebulization 3 milliLiter(s) Nebulizer every 4 hours  buDESOnide    Inhalation Suspension 0.5 milliGRAM(s) Inhalation two times a day  calcium carbonate    500 mG (Tums) Chewable 1 Tablet(s) Enteral Tube daily  ferrous    sulfate Liquid 220 milliGRAM(s) Enteral Tube daily  heparin   Injectable 5000 Unit(s) SubCutaneous every 8 hours  lacosamide Solution 200 milliGRAM(s) Oral <User Schedule>  levETIRAcetam  Solution 600 milliGRAM(s) Enteral Tube two times a day  multivitamin/minerals 1 Tablet(s) Oral daily  sodium chloride 0.9% for Nebulization 3 milliLiter(s) Nebulizer two times a day  sodium chloride 0.9%. 1000 milliLiter(s) (100 mL/Hr) IV Continuous <Continuous>  valproic  acid Syrup 750 milliGRAM(s) Enteral Tube three times a day  vitamin A &amp; D Ointment 1 Application(s) Topical daily    MEDICATIONS  (PRN):  aluminum hydroxide/magnesium hydroxide/simethicone Suspension 30 milliLiter(s) Enteral Tube every 4 hours PRN Dyspepsia  magnesium hydroxide Suspension 30 milliLiter(s) Oral daily PRN Constipation  mineral oil/petrolatum Hydrophilic Ointment 1 Application(s) Topical four times a day PRN Skin care  ondansetron Injectable 4 milliGRAM(s) IV Push every 8 hours PRN Nausea and/or Vomiting  saline laxative (FLEET) Rectal Enema 1 Enema Rectal once PRN constipation        CAPILLARY BLOOD GLUCOSE      POCT Blood Glucose.: 91 mg/dL (20 Jan 2022 06:10)    I&O's Summary      PHYSICAL EXAM:  GENERAL: NAD, well-developed  HEAD:  Atraumatic, Normocephalic  EYES: conjunctiva and sclera clear  NECK:  No JVD  CHEST/LUNG: +rhonchi  HEART: Regular rate and rhythm; S1S2  ABDOMEN: Soft, Nontender, Nondistended; Bowel sounds present  EXT: No clubbing, cyanosis, or edema  PSYCH: AAOx0      LABS:                        12.8   6.61  )-----------( 114      ( 20 Jan 2022 06:55 )             38.6     01-20    135  |  94<L>  |  19  ----------------------------<  90  4.5   |  31  |  0.54    Ca    9.6      20 Jan 2022 06:55  Phos  5.4     01-20  Mg     1.8     01-20    TPro  7.5  /  Alb  3.5  /  TBili  0.2  /  DBili  x   /  AST  44<H>  /  ALT  19  /  AlkPhos  91  01-19    PT/INR - ( 20 Jan 2022 06:55 )   PT: 11.7 sec;   INR: 0.97 ratio         PTT - ( 20 Jan 2022 06:55 )  PTT:39.6 sec          RADIOLOGY & ADDITIONAL TESTS:    Imaging Personally Reviewed:    Consultant(s) Notes Reviewed:      Care Discussed with Consultants/Other Providers:

## 2022-01-20 NOTE — ED ADULT NURSE REASSESSMENT NOTE - NS ED NURSE REASSESS COMMENT FT1
report received from lyndsey brown, pt A&Ox0, pt with peg tube that is clean dry and intact with no drainage noted at site, pt laying in no acute distress, pt cleaned and changed, safety and comfort provided.

## 2022-01-21 ENCOUNTER — TRANSCRIPTION ENCOUNTER (OUTPATIENT)
Age: 57
End: 2022-01-21

## 2022-01-21 LAB
ANION GAP SERPL CALC-SCNC: 12 MMOL/L — SIGNIFICANT CHANGE UP (ref 5–17)
BUN SERPL-MCNC: 15 MG/DL — SIGNIFICANT CHANGE UP (ref 7–23)
CALCIUM SERPL-MCNC: 9.7 MG/DL — SIGNIFICANT CHANGE UP (ref 8.4–10.5)
CHLORIDE SERPL-SCNC: 93 MMOL/L — LOW (ref 96–108)
CO2 SERPL-SCNC: 32 MMOL/L — HIGH (ref 22–31)
CREAT SERPL-MCNC: 0.48 MG/DL — LOW (ref 0.5–1.3)
GLUCOSE BLDC GLUCOMTR-MCNC: 84 MG/DL — SIGNIFICANT CHANGE UP (ref 70–99)
GLUCOSE BLDC GLUCOMTR-MCNC: 87 MG/DL — SIGNIFICANT CHANGE UP (ref 70–99)
GLUCOSE BLDC GLUCOMTR-MCNC: 94 MG/DL — SIGNIFICANT CHANGE UP (ref 70–99)
GLUCOSE BLDC GLUCOMTR-MCNC: 99 MG/DL — SIGNIFICANT CHANGE UP (ref 70–99)
GLUCOSE BLDC GLUCOMTR-MCNC: 99 MG/DL — SIGNIFICANT CHANGE UP (ref 70–99)
GLUCOSE SERPL-MCNC: 80 MG/DL — SIGNIFICANT CHANGE UP (ref 70–99)
HCT VFR BLD CALC: 41.9 % — SIGNIFICANT CHANGE UP (ref 34.5–45)
HGB BLD-MCNC: 13.5 G/DL — SIGNIFICANT CHANGE UP (ref 11.5–15.5)
MAGNESIUM SERPL-MCNC: 1.9 MG/DL — SIGNIFICANT CHANGE UP (ref 1.6–2.6)
MCHC RBC-ENTMCNC: 32.2 GM/DL — SIGNIFICANT CHANGE UP (ref 32–36)
MCHC RBC-ENTMCNC: 34.3 PG — HIGH (ref 27–34)
MCV RBC AUTO: 106.3 FL — HIGH (ref 80–100)
NRBC # BLD: 0 /100 WBCS — SIGNIFICANT CHANGE UP (ref 0–0)
PHOSPHATE SERPL-MCNC: 5.1 MG/DL — HIGH (ref 2.5–4.5)
PLATELET # BLD AUTO: 113 K/UL — LOW (ref 150–400)
POTASSIUM SERPL-MCNC: 4.4 MMOL/L — SIGNIFICANT CHANGE UP (ref 3.5–5.3)
POTASSIUM SERPL-SCNC: 4.4 MMOL/L — SIGNIFICANT CHANGE UP (ref 3.5–5.3)
RBC # BLD: 3.94 M/UL — SIGNIFICANT CHANGE UP (ref 3.8–5.2)
RBC # FLD: 14.6 % — HIGH (ref 10.3–14.5)
SODIUM SERPL-SCNC: 137 MMOL/L — SIGNIFICANT CHANGE UP (ref 135–145)
WBC # BLD: 4.94 K/UL — SIGNIFICANT CHANGE UP (ref 3.8–10.5)
WBC # FLD AUTO: 4.94 K/UL — SIGNIFICANT CHANGE UP (ref 3.8–10.5)

## 2022-01-21 PROCEDURE — 99233 SBSQ HOSP IP/OBS HIGH 50: CPT

## 2022-01-21 RX ORDER — LANOLIN/MINERAL OIL
1 LOTION (ML) TOPICAL
Qty: 0 | Refills: 0 | DISCHARGE
Start: 2022-01-21

## 2022-01-21 RX ADMIN — LACOSAMIDE 200 MILLIGRAM(S): 50 TABLET ORAL at 15:56

## 2022-01-21 RX ADMIN — LACOSAMIDE 200 MILLIGRAM(S): 50 TABLET ORAL at 22:55

## 2022-01-21 RX ADMIN — Medication 3 MILLILITER(S): at 12:35

## 2022-01-21 RX ADMIN — Medication 750 MILLIGRAM(S): at 15:57

## 2022-01-21 RX ADMIN — Medication 3 MILLILITER(S): at 07:00

## 2022-01-21 RX ADMIN — LEVETIRACETAM 600 MILLIGRAM(S): 250 TABLET, FILM COATED ORAL at 06:43

## 2022-01-21 RX ADMIN — Medication 1 APPLICATION(S): at 12:36

## 2022-01-21 RX ADMIN — LEVETIRACETAM 600 MILLIGRAM(S): 250 TABLET, FILM COATED ORAL at 19:58

## 2022-01-21 RX ADMIN — Medication 220 MILLIGRAM(S): at 12:35

## 2022-01-21 RX ADMIN — HEPARIN SODIUM 5000 UNIT(S): 5000 INJECTION INTRAVENOUS; SUBCUTANEOUS at 15:55

## 2022-01-21 RX ADMIN — Medication 1 TABLET(S): at 12:36

## 2022-01-21 RX ADMIN — Medication 3 MILLILITER(S): at 18:54

## 2022-01-21 RX ADMIN — Medication 750 MILLIGRAM(S): at 21:17

## 2022-01-21 RX ADMIN — SODIUM CHLORIDE 3 MILLILITER(S): 9 INJECTION INTRAMUSCULAR; INTRAVENOUS; SUBCUTANEOUS at 06:58

## 2022-01-21 RX ADMIN — Medication 3 MILLILITER(S): at 21:44

## 2022-01-21 RX ADMIN — Medication 750 MILLIGRAM(S): at 06:42

## 2022-01-21 RX ADMIN — LACOSAMIDE 200 MILLIGRAM(S): 50 TABLET ORAL at 06:45

## 2022-01-21 RX ADMIN — Medication 0.5 MILLIGRAM(S): at 21:15

## 2022-01-21 RX ADMIN — HEPARIN SODIUM 5000 UNIT(S): 5000 INJECTION INTRAVENOUS; SUBCUTANEOUS at 06:44

## 2022-01-21 RX ADMIN — Medication 0.5 MILLIGRAM(S): at 06:58

## 2022-01-21 RX ADMIN — SODIUM CHLORIDE 3 MILLILITER(S): 9 INJECTION INTRAMUSCULAR; INTRAVENOUS; SUBCUTANEOUS at 21:16

## 2022-01-21 RX ADMIN — HEPARIN SODIUM 5000 UNIT(S): 5000 INJECTION INTRAVENOUS; SUBCUTANEOUS at 21:37

## 2022-01-21 NOTE — DIETITIAN INITIAL EVALUATION ADULT. - ENTERAL
1) When medically feasible recommend continuos feeds of Jevity 1.2 at 20 cc/hr x24 hours increasing as tolerated by 15 cc/hr q 4 hours to goal rate 55cc/hr x24 hours to provide total volume 1320cc, 1584 kcals, 73 gm protein, and 1065 cc free water. Meets 23 kcals/kG and 1.07 gm protein/kG based on dosing wt 68.0 kG 1) Given "had a few episodes of nbnb emesis attributed to excessive tube feeds." and when medically feasible recommend continuos feeds of Jevity 1.2 at 20 cc/hr x24 hours increasing as tolerated by 15 cc/hr q 4 hours to goal rate 55cc/hr x24 hours to provide total volume 1320cc, 1584 kcals, 73 gm protein, and 1065 cc free water. Meets 23 kcals/kG and 1.07 gm protein/kG based on dosing wt 68.0 kG 1) Given "had a few episodes of nbnb emesis attributed to excessive tube feeds." and when medically feasible recommend continuos feeds of Jevity 1.2 at 10 cc/hr x24 hours increasing as tolerated and electrolytes WNL to goal rate 55cc/hr x24 hours to provide total volume 1320cc, 1584 kcals, 73 gm protein, and 1065 cc free water. Meets 23 kcals/kG and 1.07 gm protein/kG based on dosing wt 68.0 kG

## 2022-01-21 NOTE — DIETITIAN INITIAL EVALUATION ADULT. - ADD RECOMMEND
2) As medically feasible, continue to provide micronutrients. 3) Continue to trend labs, weight, skin integrity, and intake. 2) As medically feasible, continue to provide micronutrients (consider addition of thiamine for refeeding risk). 3) Continue to trend labs, weight, skin integrity, and intake.

## 2022-01-21 NOTE — DISCHARGE NOTE PROVIDER - HOSPITAL COURSE
57 y/o F with PMHx of Cerebral palsy(non-verbal(moans), bedbound at baseline),profound intellectual disability seizure d/o, constipation multiple admissions for aspiration PNA(pseudomonas& MRSA in sputum) requiring intubation s/p PEG presenting with worsening cough and hemoptysis. History obtained by aide at bedside. She states pt with chronic cough that has been getting worse and productive of thick phlegm that had streaks of blood noticed today. Reports pt appears to have chest congestion with a rattling noise worse when she coughs and has been wheezing more of late requiring multiple nebulizer treatments. She states pt seems unable to clear her secretions and has no suctioning device at home. She also states pts abdomen has been notably distended and had a few episodes of nbnb emesis attributed to excessive tube feeds. No reports of fevers, increased moaning d/t chest pain, abdominal pain, hematemesis, melena/hematochezia, hematuria.    ED course: Afebrile, BP:100/50->112/100  HR:111->91 O2:94% on 15L NRB-> 100% on 4L NC. Given Nebulizer treatment x1. CXR with clear lungs. CTA Chest negative for PE.   Sputum notable with streaks of blood per picture shown by aide.   Bronchiectasis given Hx pseudomonas aspiration PNA vs chronic bronchitis.   Duonebs and Chest PT initiated.     CT A/P:  Questionable retraction of the percutaneous gastrostomy tube into the abdominal wall versus tenting of the stomach. Developing buried bumper syndrome is not excluded.  No evidence of bowel obstruction, as clinically questioned.    CTA Chest/A/P (repeated)   No pulmonary embolism. No retroperitoneal hematoma. Other stable chronic findings, as above. Gastrostomy tube in the stomach.    COVID/RVP negative.   Patient workup negative for acute disease. Pt cleared to go back to facility.    57 y/o F with PMHx of Cerebral palsy(non-verbal(moans), bedbound at baseline),profound intellectual disability seizure d/o, constipation multiple admissions for aspiration PNA(pseudomonas& MRSA in sputum) requiring intubation s/p PEG presenting with worsening cough and hemoptysis. History obtained by aide at bedside. She states pt with chronic cough that has been getting worse and productive of thick phlegm that had streaks of blood noticed today. Reports pt appears to have chest congestion with a rattling noise worse when she coughs and has been wheezing more of late requiring multiple nebulizer treatments. She states pt seems unable to clear her secretions and has no suctioning device at home. She also states pts abdomen has been notably distended and had a few episodes of nbnb emesis attributed to excessive tube feeds. No reports of fevers, increased moaning d/t chest pain, abdominal pain, hematemesis, melena/hematochezia, hematuria.    ED course: Afebrile, BP:100/50->112/100  HR:111->91 O2:94% on 15L NRB-> 100% on 4L NC. Given Nebulizer treatment x1. CXR with clear lungs. CTA Chest negative for PE.   Sputum notable with streaks of blood per picture shown by aide.   Bronchiectasis given Hx pseudomonas aspiration PNA vs chronic bronchitis.   Duonebs and Chest PT initiated.     CT A/P:  Questionable retraction of the percutaneous gastrostomy tube into the abdominal wall versus tenting of the stomach. Developing buried bumper syndrome is not excluded.  No evidence of bowel obstruction, as clinically questioned. PEG feeds restarted.    CTA Chest/A/P (repeated)   No pulmonary embolism. No retroperitoneal hematoma. Other stable chronic findings, as above. Gastrostomy tube in the stomach.    COVID/RVP negative. Pulm also consulted - upon review of CT; hemoptysis deemed likely a component of chronic aspiration of secretions. Pulm recommends chest PT, hypertonic preceded by duonebs; can trial chest vest as pt likely cannot participate in handheld airway clearance device       Patient workup negative for acute disease. Pt cleared to go back to facility.    57 y/o F with PMHx of Cerebral palsy(non-verbal(moans), bedbound at baseline),profound intellectual disability seizure d/o, constipation multiple admissions for aspiration PNA(pseudomonas& MRSA in sputum) requiring intubation s/p PEG presenting with worsening cough and hemoptysis. History obtained by aide at bedside. She states pt with chronic cough that has been getting worse and productive of thick phlegm that had streaks of blood noticed today. Reports pt appears to have chest congestion with a rattling noise worse when she coughs and has been wheezing more of late requiring multiple nebulizer treatments. She states pt seems unable to clear her secretions and has no suctioning device at home. She also states pts abdomen has been notably distended and had a few episodes of nbnb emesis attributed to excessive tube feeds. No reports of fevers, increased moaning d/t chest pain, abdominal pain, hematemesis, melena/hematochezia, hematuria.    ED course: Afebrile, BP:100/50->112/100  HR:111->91 O2:94% on 15L NRB-> 100% on 4L NC. Given Nebulizer treatment x1. CXR with clear lungs. CTA Chest negative for PE.   Sputum notable with streaks of blood per picture shown by aide.   Bronchiectasis given Hx pseudomonas aspiration PNA vs chronic bronchitis.   Duonebs and Chest PT initiated.     CT A/P:  Questionable retraction of the percutaneous gastrostomy tube into the abdominal wall versus tenting of the stomach. Developing buried bumper syndrome is not excluded.  No evidence of bowel obstruction, as clinically questioned. PEG feeds restarted.    CTA Chest/A/P (repeated)   No pulmonary embolism. No retroperitoneal hematoma. Other stable chronic findings, as above. Gastrostomy tube in the stomach.    COVID/RVP negative. Pulm also consulted - upon review of CT; hemoptysis deemed likely a component of chronic aspiration of secretions. Pulm recommends chest PT, hypertonic preceded by duonebs; can trial chest vest as pt likely cannot participate in handheld airway clearance device   On 1/29 - Found to have dislodged PEG tube. PEG successfully exchanged at bedside - confirmed via xray.        Patient workup negative for acute disease. Pt cleared to go back to facility.

## 2022-01-21 NOTE — DISCHARGE NOTE PROVIDER - NSDCPNSUBOBJ_GEN_ALL_CORE
Pt admitted for hemoptysis treated for pneumonia but no source of bleeding found.  PEG fell out but was replaced by GI, now back tolerating feeds.  No more secretions from pneumonia requiring suctioning.  Will DC back to group home. Discharge time 54minutes. Discussed discharge plan with mother Evonne.

## 2022-01-21 NOTE — DIETITIAN INITIAL EVALUATION ADULT. - OTHER INFO
Dosing wt: 68 kG. UBW unknown. Wt Hx per chart in k.5 (21), 77.2 (19 - with edema). RD unable to obtain wt as pt on stretcher in ED. RD will continue to trend as new wts available/able. Dosing wt vs wt from  indicated stable wt x1 year.    Pt ordered for Jevity 1.5 at 79 mL/hr x24 hours. RN experiencing resistance when trying to flush PEG. Not receiving feeds at this time. Pt with BM today ; on bowel regimen.    Per H&P, "had a few episodes of nbnb emesis attributed to excessive tube feeds."

## 2022-01-21 NOTE — DIETITIAN INITIAL EVALUATION ADULT. - ORAL INTAKE PTA/DIET HISTORY
RD attempted to contact emergency number to obtain home regimen PTA. Pt has a PEG. Per H&P, pt was on ferrous sulfate and calcium carbonate. Per previous RD note on 2/25/21, "pt receiving 6 cans of Jevity 1.2 PTA (1422 ml total)" 1706 kcals, 78.9 gm protein, and 1,147.6 cc free water. Meets 25 kcals/kG and 1.16 gm protein/kG based on dosing wt 68 kG. No known food allergies. Unknown if pt took anything by mouth.

## 2022-01-21 NOTE — DISCHARGE NOTE PROVIDER - NSDCCPCAREPLAN_GEN_ALL_CORE_FT
PRINCIPAL DISCHARGE DIAGNOSIS  Diagnosis: Hemoptysis  Assessment and Plan of Treatment: Your workup is negative.       PRINCIPAL DISCHARGE DIAGNOSIS  Diagnosis: Hemoptysis  Assessment and Plan of Treatment: Your workup is negative.  Continue with Duonebs and saline nebulizer(mucolytic)  Chest PT  Not currently having hemoptysis  No longer equiring suctioning        SECONDARY DISCHARGE DIAGNOSES  Diagnosis: Cerebral palsy  Assessment and Plan of Treatment: Continue with antiepileptics     PRINCIPAL DISCHARGE DIAGNOSIS  Diagnosis: Hemoptysis  Assessment and Plan of Treatment: Your workup is negative.  Continue with Duonebs and saline nebulizer(mucolytic)  Chest PT  Not currently having hemoptysis  No longer equiring suctioning        SECONDARY DISCHARGE DIAGNOSES  Diagnosis: S/P percutaneous endoscopic gastrostomy (PEG) tube placement  Assessment and Plan of Treatment: PEG replaced  Continue Jevity at 55cc/hr x 24 hrs    Diagnosis: Cerebral palsy  Assessment and Plan of Treatment: Continue with antiepileptics

## 2022-01-21 NOTE — DIETITIAN INITIAL EVALUATION ADULT. - PERTINENT MEDS FT
MEDICATIONS  (STANDING):  albuterol/ipratropium for Nebulization 3 milliLiter(s) Nebulizer every 4 hours  buDESOnide    Inhalation Suspension 0.5 milliGRAM(s) Inhalation two times a day  calcium carbonate    500 mG (Tums) Chewable 1 Tablet(s) Enteral Tube daily  ferrous    sulfate Liquid 220 milliGRAM(s) Enteral Tube daily  heparin   Injectable 5000 Unit(s) SubCutaneous every 8 hours  lacosamide Solution 200 milliGRAM(s) Oral <User Schedule>  levETIRAcetam  Solution 600 milliGRAM(s) Enteral Tube two times a day  multivitamin/minerals 1 Tablet(s) Oral daily  sodium chloride 0.9% for Nebulization 3 milliLiter(s) Nebulizer two times a day  sodium chloride 0.9%. 1000 milliLiter(s) (100 mL/Hr) IV Continuous <Continuous>  valproic  acid Syrup 750 milliGRAM(s) Enteral Tube three times a day  vitamin A &amp; D Ointment 1 Application(s) Topical daily

## 2022-01-21 NOTE — DIETITIAN INITIAL EVALUATION ADULT. - CHIEF COMPLAINT
Per Chart: Pt is a 57 y/o F PMH Cerebral palsy(non-verbal(moans), bedbound at baseline),profound intellectual disability seizure d/o, constipation multiple admissions for aspiration PNA(pseudomonas& MRSA in sputum) requiring intubation s/p PEG presenting with worsening cough and hemoptysis.

## 2022-01-21 NOTE — PROGRESS NOTE ADULT - PROBLEM SELECTOR PLAN 2
-CT A/P 1/19 in the ED with Questionable retraction of the percutaneous gastrostomy tube into the abdominal wall versus tenting of the stomach. Developing buried bumper syndrome is not excluded.  -Repeat CT A/P with contrast ordered by ED not showing retraction   -Per staff, able to administer meds via peg tube however tube feeds cannot be started as there is no kangroo pump  -Staff to obtain pump/ start tube feeds, once certain peg functioning will dc to [facility]

## 2022-01-21 NOTE — DISCHARGE NOTE PROVIDER - NSDCMRMEDTOKEN_GEN_ALL_CORE_FT
albuterol 2.5 mg/3 mL (0.083%) inhalation solution: 3 milliliter(s) inhaled every 4 hours, As Needed  Aquaphor topical ointment: Apply topically to affected area 4 times a day, As Needed for dry lips  Bactrim: 10 milliliter(s) by gastrostomy tube once a day  budesonide 0.5 mg/2 mL inhalation suspension: 2 milliliter(s) inhaled 2 times a day  calcium carbonate 600 mg oral tablet, chewable: 1 tab(s) orally once a day  emollients, topical ointment: 1 application topically 4 times a day, As needed, Skin care  ferrous sulfate 220 mg/5 mL (44 mg/5 mL elemental iron) oral elixir: 5 milliliter(s) orally once a day  Fleet Enema 19 g-7 g rectal enema: 133 milliliter(s) rectal once  on day 3 evening of no BM  Fosamax 70 mg oral tablet: 1 tab(s) by gastrostomy tube once a week  Keppra 100 mg/mL oral solution: 6 milliliter(s) via PEG 2 times a day  ketoconazole 2% topical shampoo: Apply topically to scalp 3 times a week for seborrheic dermatitis  metroNIDAZOLE 0.75% topical lotion: Apply topically to face 2 times a day  Milk of Magnesia: 30 milliliter(s) orally once a day, As Needed  Nayzilam 5 mg/inh nasal spray: 1 puff(s) nasal once for seizure lasting &lt;4mins  Thera M oral tablet: 1 tab(s) by gastrostomy tube once a day  valproic acid 250 mg/5 mL oral liquid: 15 milliliter(s) by gastrostomy tube 3 times a day  Vimpat 10 mg/mL oral solution: 20 milliliter(s) orally 3 times a day MDD:600  vitamin A and D topical ointment: Apply topically to affected area once a day to healed wound behind left knee   albuterol 2.5 mg/3 mL (0.083%) inhalation solution: 3 milliliter(s) inhaled every 4 hours, As Needed  Aquaphor topical ointment: Apply topically to affected area 4 times a day, As Needed for dry lips  Bactrim: 10 milliliter(s) by gastrostomy tube once a day  budesonide 0.5 mg/2 mL inhalation suspension: 2 milliliter(s) inhaled 2 times a day  calcium carbonate 600 mg oral tablet, chewable: 1 tab(s) orally once a day (in the morning)  emollients, topical ointment: 1 application topically 4 times a day, As needed, Skin care  ferrous sulfate 220 mg/5 mL (44 mg/5 mL elemental iron) oral elixir: 5 milliliter(s) orally once a day (at bedtime)  Fleet Enema 19 g-7 g rectal enema: 133 milliliter(s) rectal once  on day 3 evening of no BM  Fosamax 70 mg oral tablet: 1 tab(s) by gastrostomy tube once a week  Keppra 100 mg/mL oral solution: 6 milliliter(s) via PEG 2 times a day  ketoconazole 2% topical shampoo: Apply topically to scalp 3 times a week for seborrheic dermatitis  metroNIDAZOLE 0.75% topical lotion: Apply topically to face 2 times a day  Milk of Magnesia: 30 milliliter(s) orally once a day, As Needed  Nayzilam 5 mg/inh nasal spray: 1 puff(s) nasal once for seizure lasting &lt;4mins  Thera M oral tablet: 1 tab(s) by gastrostomy tube once a day (in the afternoon)  valproic acid 250 mg/5 mL oral liquid: 15 milliliter(s) by gastrostomy tube 3 times a day  Vimpat 10 mg/mL oral solution: 20 milliliter(s) orally 3 times a day MDD:600  vitamin A and D topical ointment: Apply topically to affected area once a day to healed wound behind left knee   albuterol 2.5 mg/3 mL (0.083%) inhalation solution: 3 milliliter(s) inhaled every 4 hours, As Needed  Aquaphor topical ointment: Apply topically to affected area 4 times a day, As Needed for dry lips  budesonide 0.5 mg/2 mL inhalation suspension: 2 milliliter(s) inhaled 2 times a day  calcium carbonate 600 mg oral tablet, chewable: 1 tab(s) by gastrostomy tube once a day (in the morning)  emollients, topical ointment: 1 application topically 4 times a day, As needed, Skin care  ferrous sulfate 220 mg/5 mL (44 mg/5 mL elemental iron) oral elixir: 5 milliliter(s) by gastrostomy tube once a day (at bedtime)  Fleet Enema 19 g-7 g rectal enema: 133 milliliter(s) rectal once  on day 3 evening of no BM  Keppra 100 mg/mL oral solution: 6 milliliter(s) via PEG 2 times a day  ketoconazole 2% topical shampoo: Apply topically to scalp 3 times a week for seborrheic dermatitis  metroNIDAZOLE 0.75% topical lotion: Apply topically to face 2 times a day  Milk of Magnesia: 30 milliliter(s) by gastrostomy tube once a day, As Needed  Nayzilam 5 mg/inh nasal spray: 1 puff(s) nasal once for seizure lasting &lt;4mins  senna oral tablet: 2 tab(s) orally once a day (at bedtime)  Thera M oral tablet: 1 tab(s) by gastrostomy tube once a day (in the afternoon)  valproic acid 250 mg/5 mL oral liquid: 15 milliliter(s) by gastrostomy tube 3 times a day  Vimpat 10 mg/mL oral solution: 20 milliliter(s) orally 3 times a day MDD:600  vitamin A and D topical ointment: Apply topically to affected area once a day to healed wound behind left knee   albuterol 2.5 mg/3 mL (0.083%) inhalation solution: 3 milliliter(s) inhaled every 4 hours, As Needed  Aquaphor topical ointment: Apply topically to affected area 4 times a day, As Needed for dry lips  budesonide 0.5 mg/2 mL inhalation suspension: 2 milliliter(s) inhaled 2 times a day  calcium carbonate 600 mg oral tablet, chewable: 1 tab(s) by gastrostomy tube once a day (in the morning)  ferrous sulfate 220 mg/5 mL (44 mg/5 mL elemental iron) oral elixir: 5 milliliter(s) by gastrostomy tube once a day (at bedtime)  Fleet Enema 19 g-7 g rectal enema: 133 milliliter(s) rectal once  on day 3 evening of no BM  Keppra 100 mg/mL oral solution: 6 milliliter(s) via PEG 2 times a day  Milk of Magnesia: 30 milliliter(s) by gastrostomy tube once a day, As Needed  Nayzilam 5 mg/inh nasal spray: 1 puff(s) nasal once for seizure lasting &lt;4mins  senna oral tablet: 2 tab(s) orally once a day (at bedtime)  Thera M oral tablet: 1 tab(s) by gastrostomy tube once a day (in the afternoon)  valproic acid 250 mg/5 mL oral liquid: 15 milliliter(s) by gastrostomy tube 3 times a day  Vimpat 10 mg/mL oral solution: 20 milliliter(s) orally 3 times a day MDD:600  vitamin A and D topical ointment: Apply topically to affected area once a day to healed wound behind left knee   albuterol 2.5 mg/3 mL (0.083%) inhalation solution: 3 milliliter(s) inhaled every 4 hours, As Needed  Aquaphor topical ointment: Apply topically to affected area 4 times a day, As Needed for dry lips  budesonide 0.5 mg/2 mL inhalation suspension: 2 milliliter(s) inhaled 2 times a day  calcium carbonate 600 mg oral tablet, chewable: 1 tab(s) by gastrostomy tube once a day (in the morning)  ferrous sulfate 220 mg/5 mL (44 mg/5 mL elemental iron) oral elixir: 5 milliliter(s) by gastrostomy tube once a day (at bedtime)  Fleet Enema 19 g-7 g rectal enema: 133 milliliter(s) rectal once  on day 3 evening of no BM  Keppra 100 mg/mL oral solution: 6 milliliter(s) via PEG 2 times a day  Milk of Magnesia: 30 milliliter(s) by gastrostomy tube once a day, As Needed  Nayzilam 5 mg/inh nasal spray: 1 puff(s) nasal once for seizure lasting &lt;4mins  senna oral tablet: 2 tab(s) by gastrostomy tube once a day (at bedtime)   Thera M oral tablet: 1 tab(s) by gastrostomy tube once a day (in the afternoon)  valproic acid 250 mg/5 mL oral liquid: 15 milliliter(s) by gastrostomy tube 3 times a day  Vimpat 10 mg/mL oral solution: 20 milliliter(s) orally 3 times a day MDD:60ml  vitamin A and D topical ointment: Apply topically to affected area once a day to healed wound behind left knee

## 2022-01-21 NOTE — DIETITIAN INITIAL EVALUATION ADULT. - PERTINENT LABORATORY DATA
01-21 Na 137 mmol/L Glu 80 mg/dL K+ 4.4 mmol/L Cr  0.48 mg/dL<L> BUN 15 mg/dL Phos 5.1 mg/dL<H>  Hgb 13.5 g/dL Hct 41.9 %

## 2022-01-21 NOTE — DIETITIAN INITIAL EVALUATION ADULT. - REASON INDICATOR FOR ASSESSMENT
Consult for tube feeding.  Source: Medical record and RN (pt unable to speak and emergency contact number goes to busy signal)

## 2022-01-21 NOTE — PATIENT PROFILE ADULT - FUNCTIONAL ASSESSMENT - DAILY ACTIVITY 6.
1 = Total assistance
< from: Xray Chest 1 View-PORTABLE IMMEDIATE (05.28.20 @ 19:29) >  EXAM:  XR CHEST PORTABLE IMMED 1V                        PROCEDURE DATE:  05/28/2020    INTERPRETATION:  CLINICAL HISTORY: Shortness of breath.  Cough.  Fever.  TECHNIQUE: Portable AP chest radiograph  COMPARISON STUDY: None.  FINDINGS:   Multiple EKG leads and other external devices are superimposed over the chest and abdomen.  A tracheostomy cannula is superimposed over the tracheal air column.  Mild patchy opacity in both lungs is improved from 05/04/2020.  The cardiomediastinal silhouette appears within normal limits.  The patient's left forearm is superimposed over the upper abdomen.  Orthopedic hardware is partially visualized in the distal left radius.  IMPRESSION:   Mild patchy opacity in both lungs is improved from 05/04/2020.  < end of copied text >  < from: US Duplex Arterial Lower Ext Compl, Bilateral (05.11.20 @ 11:25) >  IMPRESSION:  Peroneal arteries not seen bilaterally.  No occlusion or evidence of a hemodynamically significant stenosis in the visualized lower extremity arteries bilaterally.  < end of copied text >  < from: CT Angio Chest w/ IV Cont (05.08.20 @ 14:13) >  IMPRESSION:  Pattern of GGO suggests infection including atypical pneumonia/viral infection from atypical agents including COVID-19 (C19V-1).  Limited study due to respiratory motion. No saddle PE.  Small bilateral pleural effusions and small pericardial effusion.  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  2. Thickened aortic valve. Minimal aortic regurgitation.  3. Normal left atrium.  LA volume index = 23 cc/m2.  4. Normal left ventricular internal dimensions and wall  thickness.  5. Normal left ventricular systolic function. EF 65%. No  segmental wall motion abnormalities.  6. Normal right atrium.  7. Normal right ventricular size and function.  8. Estimated right ventricular systolic pressure equals 29  mm Hg, assuming right atrial pressure equals 10 mm Hg,  consistent with normal pulmonary pressures.  9. Mild tricuspid regurgitation.  10. Small pericardial effusion. Thickened pericardium with  fibrinous deposits.

## 2022-01-21 NOTE — PROGRESS NOTE ADULT - SUBJECTIVE AND OBJECTIVE BOX
Patient is a 56y old  Female who presents with a chief complaint of Worsening productive cough, hemoptysis (21 Jan 2022 14:07)      SUBJECTIVE / OVERNIGHT EVENTS: appears less agitated     MEDICATIONS  (STANDING):  albuterol/ipratropium for Nebulization 3 milliLiter(s) Nebulizer every 4 hours  buDESOnide    Inhalation Suspension 0.5 milliGRAM(s) Inhalation two times a day  calcium carbonate    500 mG (Tums) Chewable 1 Tablet(s) Enteral Tube daily  ferrous    sulfate Liquid 220 milliGRAM(s) Enteral Tube daily  heparin   Injectable 5000 Unit(s) SubCutaneous every 8 hours  lacosamide Solution 200 milliGRAM(s) Oral <User Schedule>  levETIRAcetam  Solution 600 milliGRAM(s) Enteral Tube two times a day  multivitamin/minerals 1 Tablet(s) Oral daily  sodium chloride 0.9% for Nebulization 3 milliLiter(s) Nebulizer two times a day  valproic  acid Syrup 750 milliGRAM(s) Enteral Tube three times a day  vitamin A &amp; D Ointment 1 Application(s) Topical daily    MEDICATIONS  (PRN):  aluminum hydroxide/magnesium hydroxide/simethicone Suspension 30 milliLiter(s) Enteral Tube every 4 hours PRN Dyspepsia  magnesium hydroxide Suspension 30 milliLiter(s) Oral daily PRN Constipation  mineral oil/petrolatum Hydrophilic Ointment 1 Application(s) Topical four times a day PRN Skin care  ondansetron Injectable 4 milliGRAM(s) IV Push every 8 hours PRN Nausea and/or Vomiting  saline laxative (FLEET) Rectal Enema 1 Enema Rectal once PRN constipation        CAPILLARY BLOOD GLUCOSE      POCT Blood Glucose.: 94 mg/dL (21 Jan 2022 12:54)  POCT Blood Glucose.: 99 mg/dL (21 Jan 2022 06:15)  POCT Blood Glucose.: 84 mg/dL (21 Jan 2022 00:15)    I&O's Summary      PHYSICAL EXAM:  GENERAL: NAD, well-developed  HEAD:  Atraumatic, Normocephalic  EYES: conjunctiva and sclera clear  NECK:  No JVD  CHEST/LUNG: decreased breath sounds bases, some rhonchi  HEART: Regular rate and rhythm; S1S2  ABDOMEN: Soft, Nontender, Nondistended; Bowel sounds present  EXT: No clubbing, cyanosis, or edema  PSYCH: AAOx0    LABS:                        13.5   4.94  )-----------( 113      ( 21 Jan 2022 06:20 )             41.9     01-21    137  |  93<L>  |  15  ----------------------------<  80  4.4   |  32<H>  |  0.48<L>    Ca    9.7      21 Jan 2022 06:20  Phos  5.1     01-21  Mg     1.9     01-21    TPro  7.5  /  Alb  3.5  /  TBili  0.2  /  DBili  x   /  AST  44<H>  /  ALT  19  /  AlkPhos  91  01-19    PT/INR - ( 20 Jan 2022 06:55 )   PT: 11.7 sec;   INR: 0.97 ratio         PTT - ( 20 Jan 2022 06:55 )  PTT:39.6 sec          RADIOLOGY & ADDITIONAL TESTS:    Imaging Personally Reviewed:    Consultant(s) Notes Reviewed:      Care Discussed with Consultants/Other Providers:

## 2022-01-21 NOTE — PATIENT PROFILE ADULT - FALL HARM RISK - HARM RISK INTERVENTIONS

## 2022-01-22 LAB
ANION GAP SERPL CALC-SCNC: 12 MMOL/L — SIGNIFICANT CHANGE UP (ref 5–17)
BUN SERPL-MCNC: 13 MG/DL — SIGNIFICANT CHANGE UP (ref 7–23)
CALCIUM SERPL-MCNC: 9.5 MG/DL — SIGNIFICANT CHANGE UP (ref 8.4–10.5)
CHLORIDE SERPL-SCNC: 96 MMOL/L — SIGNIFICANT CHANGE UP (ref 96–108)
CO2 SERPL-SCNC: 31 MMOL/L — SIGNIFICANT CHANGE UP (ref 22–31)
CREAT SERPL-MCNC: 0.42 MG/DL — LOW (ref 0.5–1.3)
GLUCOSE BLDC GLUCOMTR-MCNC: 105 MG/DL — HIGH (ref 70–99)
GLUCOSE BLDC GLUCOMTR-MCNC: 88 MG/DL — SIGNIFICANT CHANGE UP (ref 70–99)
GLUCOSE BLDC GLUCOMTR-MCNC: 90 MG/DL — SIGNIFICANT CHANGE UP (ref 70–99)
GLUCOSE SERPL-MCNC: 96 MG/DL — SIGNIFICANT CHANGE UP (ref 70–99)
HCT VFR BLD CALC: 37.8 % — SIGNIFICANT CHANGE UP (ref 34.5–45)
HGB BLD-MCNC: 12.1 G/DL — SIGNIFICANT CHANGE UP (ref 11.5–15.5)
MAGNESIUM SERPL-MCNC: 1.8 MG/DL — SIGNIFICANT CHANGE UP (ref 1.6–2.6)
MCHC RBC-ENTMCNC: 32 GM/DL — SIGNIFICANT CHANGE UP (ref 32–36)
MCHC RBC-ENTMCNC: 34.4 PG — HIGH (ref 27–34)
MCV RBC AUTO: 107.4 FL — HIGH (ref 80–100)
NRBC # BLD: 0 /100 WBCS — SIGNIFICANT CHANGE UP (ref 0–0)
PHOSPHATE SERPL-MCNC: 5.4 MG/DL — HIGH (ref 2.5–4.5)
PLATELET # BLD AUTO: 94 K/UL — LOW (ref 150–400)
POTASSIUM SERPL-MCNC: 4.7 MMOL/L — SIGNIFICANT CHANGE UP (ref 3.5–5.3)
POTASSIUM SERPL-SCNC: 4.7 MMOL/L — SIGNIFICANT CHANGE UP (ref 3.5–5.3)
RBC # BLD: 3.52 M/UL — LOW (ref 3.8–5.2)
RBC # FLD: 14.6 % — HIGH (ref 10.3–14.5)
SODIUM SERPL-SCNC: 139 MMOL/L — SIGNIFICANT CHANGE UP (ref 135–145)
WBC # BLD: 4.35 K/UL — SIGNIFICANT CHANGE UP (ref 3.8–10.5)
WBC # FLD AUTO: 4.35 K/UL — SIGNIFICANT CHANGE UP (ref 3.8–10.5)

## 2022-01-22 PROCEDURE — 99232 SBSQ HOSP IP/OBS MODERATE 35: CPT

## 2022-01-22 RX ORDER — SENNA PLUS 8.6 MG/1
2 TABLET ORAL AT BEDTIME
Refills: 0 | Status: DISCONTINUED | OUTPATIENT
Start: 2022-01-22 | End: 2022-01-31

## 2022-01-22 RX ADMIN — SODIUM CHLORIDE 3 MILLILITER(S): 9 INJECTION INTRAMUSCULAR; INTRAVENOUS; SUBCUTANEOUS at 05:46

## 2022-01-22 RX ADMIN — HEPARIN SODIUM 5000 UNIT(S): 5000 INJECTION INTRAVENOUS; SUBCUTANEOUS at 21:32

## 2022-01-22 RX ADMIN — Medication 3 MILLILITER(S): at 02:18

## 2022-01-22 RX ADMIN — HEPARIN SODIUM 5000 UNIT(S): 5000 INJECTION INTRAVENOUS; SUBCUTANEOUS at 14:55

## 2022-01-22 RX ADMIN — Medication 3 MILLILITER(S): at 18:17

## 2022-01-22 RX ADMIN — Medication 0.5 MILLIGRAM(S): at 05:37

## 2022-01-22 RX ADMIN — LACOSAMIDE 200 MILLIGRAM(S): 50 TABLET ORAL at 21:26

## 2022-01-22 RX ADMIN — HEPARIN SODIUM 5000 UNIT(S): 5000 INJECTION INTRAVENOUS; SUBCUTANEOUS at 05:47

## 2022-01-22 RX ADMIN — LACOSAMIDE 200 MILLIGRAM(S): 50 TABLET ORAL at 06:01

## 2022-01-22 RX ADMIN — LACOSAMIDE 200 MILLIGRAM(S): 50 TABLET ORAL at 14:54

## 2022-01-22 RX ADMIN — SENNA PLUS 2 TABLET(S): 8.6 TABLET ORAL at 21:38

## 2022-01-22 RX ADMIN — Medication 750 MILLIGRAM(S): at 21:33

## 2022-01-22 RX ADMIN — Medication 3 MILLILITER(S): at 10:21

## 2022-01-22 RX ADMIN — LEVETIRACETAM 600 MILLIGRAM(S): 250 TABLET, FILM COATED ORAL at 18:18

## 2022-01-22 RX ADMIN — Medication 750 MILLIGRAM(S): at 14:54

## 2022-01-22 RX ADMIN — Medication 0.5 MILLIGRAM(S): at 18:17

## 2022-01-22 RX ADMIN — Medication 1 TABLET(S): at 12:03

## 2022-01-22 RX ADMIN — Medication 3 MILLILITER(S): at 14:54

## 2022-01-22 RX ADMIN — LEVETIRACETAM 600 MILLIGRAM(S): 250 TABLET, FILM COATED ORAL at 05:39

## 2022-01-22 RX ADMIN — Medication 220 MILLIGRAM(S): at 12:02

## 2022-01-22 RX ADMIN — Medication 1 TABLET(S): at 12:04

## 2022-01-22 RX ADMIN — Medication 3 MILLILITER(S): at 05:33

## 2022-01-22 RX ADMIN — Medication 3 MILLILITER(S): at 00:37

## 2022-01-22 RX ADMIN — Medication 3 MILLILITER(S): at 21:31

## 2022-01-22 RX ADMIN — SODIUM CHLORIDE 3 MILLILITER(S): 9 INJECTION INTRAMUSCULAR; INTRAVENOUS; SUBCUTANEOUS at 18:17

## 2022-01-22 RX ADMIN — Medication 750 MILLIGRAM(S): at 05:39

## 2022-01-22 NOTE — PROGRESS NOTE ADULT - SUBJECTIVE AND OBJECTIVE BOX
[facility] Division of Hospital Medicine  Renetta DO Wei  Pager (M-F, 3C-2C): [phone]  Other Times:  [phone]    Patient is a 56y old  Female who presents with a chief complaint of Worsening productive cough, hemoptysis (21 Jan 2022 14:49)      SUBJECTIVE / OVERNIGHT EVENTS: none. Patient tolerating tube feeds at low rate. Patient appears comfortable, pulls at her NC at times, unable to follow directions.  ADDITIONAL REVIEW OF SYSTEMS: negative    MEDICATIONS  (STANDING):  albuterol/ipratropium for Nebulization 3 milliLiter(s) Nebulizer every 4 hours  buDESOnide    Inhalation Suspension 0.5 milliGRAM(s) Inhalation two times a day  calcium carbonate    500 mG (Tums) Chewable 1 Tablet(s) Enteral Tube daily  ferrous    sulfate Liquid 220 milliGRAM(s) Enteral Tube daily  heparin   Injectable 5000 Unit(s) SubCutaneous every 8 hours  lacosamide Solution 200 milliGRAM(s) Oral <User Schedule>  levETIRAcetam  Solution 600 milliGRAM(s) Enteral Tube two times a day  multivitamin/minerals 1 Tablet(s) Oral daily  sodium chloride 0.9% for Nebulization 3 milliLiter(s) Nebulizer two times a day  valproic  acid Syrup 750 milliGRAM(s) Enteral Tube three times a day  vitamin A &amp; D Ointment 1 Application(s) Topical daily    MEDICATIONS  (PRN):  aluminum hydroxide/magnesium hydroxide/simethicone Suspension 30 milliLiter(s) Enteral Tube every 4 hours PRN Dyspepsia  magnesium hydroxide Suspension 30 milliLiter(s) Oral daily PRN Constipation  mineral oil/petrolatum Hydrophilic Ointment 1 Application(s) Topical four times a day PRN Skin care  ondansetron Injectable 4 milliGRAM(s) IV Push every 8 hours PRN Nausea and/or Vomiting  saline laxative (FLEET) Rectal Enema 1 Enema Rectal once PRN constipation      CAPILLARY BLOOD GLUCOSE      POCT Blood Glucose.: 88 mg/dL (22 Jan 2022 11:57)  POCT Blood Glucose.: 90 mg/dL (22 Jan 2022 06:24)  POCT Blood Glucose.: 99 mg/dL (21 Jan 2022 23:33)  POCT Blood Glucose.: 87 mg/dL (21 Jan 2022 17:50)    I&O's Summary    21 Jan 2022 07:01  -  22 Jan 2022 07:00  --------------------------------------------------------  IN: 130 mL / OUT: 0 mL / NET: 130 mL        PHYSICAL EXAM:  Vital Signs Last 24 Hrs  T(C): 36.3 (22 Jan 2022 05:22), Max: 36.4 (21 Jan 2022 14:25)  T(F): 97.3 (22 Jan 2022 05:22), Max: 97.5 (21 Jan 2022 14:25)  HR: 68 (22 Jan 2022 05:22) (68 - 87)  BP: 119/60 (22 Jan 2022 05:22) (106/78 - 131/88)  BP(mean): --  RR: 17 (22 Jan 2022 05:22) (17 - 18)  SpO2: 96% (22 Jan 2022 05:22) (93% - 100%)    CONSTITUTIONAL: Well-groomed, in no apparent distress  EYES: No conjunctival or scleral injection, non-icteric; PERRLA and symmetric  ENMT: No external nasal lesions; no pharyngeal injection or exudates, oral mucosa with moist membranes  NECK: Trachea midline without palpable neck mass; thyroid not enlarged and non-tender  RESPIRATORY: Breathing comfortably; decreased breath sounds b/l  CARDIOVASCULAR: +S1S2, RRR, no M/G/R; pedal pulses full and symmetric; no lower extremity edema  GASTROINTESTINAL: No palpable masses or tenderness, +BS throughout, no rebound/guarding; no hepatosplenomegaly; PEG in place - clean  MUSCULOSKELETAL: no digital clubbing or cyanosis;   SKIN: No rashes or ulcers noted; no subcutaneous nodules or induration palpable  PSYCHIATRIC: awake, alert, nonverbal, unable to follow directions    LABS:                        12.1   4.35  )-----------( 94       ( 22 Jan 2022 11:14 )             37.8     01-22    139  |  96  |  13  ----------------------------<  96  4.7   |  31  |  0.42<L>    Ca    9.5      22 Jan 2022 11:14  Phos  5.4     01-22  Mg     1.8     01-22

## 2022-01-23 DIAGNOSIS — D69.6 THROMBOCYTOPENIA, UNSPECIFIED: ICD-10-CM

## 2022-01-23 LAB
ANION GAP SERPL CALC-SCNC: 11 MMOL/L — SIGNIFICANT CHANGE UP (ref 5–17)
BUN SERPL-MCNC: 12 MG/DL — SIGNIFICANT CHANGE UP (ref 7–23)
CALCIUM SERPL-MCNC: 9.4 MG/DL — SIGNIFICANT CHANGE UP (ref 8.4–10.5)
CHLORIDE SERPL-SCNC: 97 MMOL/L — SIGNIFICANT CHANGE UP (ref 96–108)
CO2 SERPL-SCNC: 32 MMOL/L — HIGH (ref 22–31)
CREAT SERPL-MCNC: 0.45 MG/DL — LOW (ref 0.5–1.3)
GLUCOSE BLDC GLUCOMTR-MCNC: 81 MG/DL — SIGNIFICANT CHANGE UP (ref 70–99)
GLUCOSE BLDC GLUCOMTR-MCNC: 85 MG/DL — SIGNIFICANT CHANGE UP (ref 70–99)
GLUCOSE BLDC GLUCOMTR-MCNC: 89 MG/DL — SIGNIFICANT CHANGE UP (ref 70–99)
GLUCOSE BLDC GLUCOMTR-MCNC: 96 MG/DL — SIGNIFICANT CHANGE UP (ref 70–99)
GLUCOSE SERPL-MCNC: 82 MG/DL — SIGNIFICANT CHANGE UP (ref 70–99)
HCT VFR BLD CALC: 39.2 % — SIGNIFICANT CHANGE UP (ref 34.5–45)
HGB BLD-MCNC: 12.7 G/DL — SIGNIFICANT CHANGE UP (ref 11.5–15.5)
MAGNESIUM SERPL-MCNC: 1.8 MG/DL — SIGNIFICANT CHANGE UP (ref 1.6–2.6)
MCHC RBC-ENTMCNC: 32.4 GM/DL — SIGNIFICANT CHANGE UP (ref 32–36)
MCHC RBC-ENTMCNC: 34.7 PG — HIGH (ref 27–34)
MCV RBC AUTO: 107.1 FL — HIGH (ref 80–100)
NRBC # BLD: 0 /100 WBCS — SIGNIFICANT CHANGE UP (ref 0–0)
PHOSPHATE SERPL-MCNC: 5.6 MG/DL — HIGH (ref 2.5–4.5)
PLATELET # BLD AUTO: 80 K/UL — LOW (ref 150–400)
POTASSIUM SERPL-MCNC: 4.6 MMOL/L — SIGNIFICANT CHANGE UP (ref 3.5–5.3)
POTASSIUM SERPL-SCNC: 4.6 MMOL/L — SIGNIFICANT CHANGE UP (ref 3.5–5.3)
RBC # BLD: 3.66 M/UL — LOW (ref 3.8–5.2)
RBC # FLD: 14.6 % — HIGH (ref 10.3–14.5)
SODIUM SERPL-SCNC: 140 MMOL/L — SIGNIFICANT CHANGE UP (ref 135–145)
WBC # BLD: 5.23 K/UL — SIGNIFICANT CHANGE UP (ref 3.8–10.5)
WBC # FLD AUTO: 5.23 K/UL — SIGNIFICANT CHANGE UP (ref 3.8–10.5)

## 2022-01-23 PROCEDURE — 99232 SBSQ HOSP IP/OBS MODERATE 35: CPT

## 2022-01-23 RX ADMIN — SODIUM CHLORIDE 3 MILLILITER(S): 9 INJECTION INTRAMUSCULAR; INTRAVENOUS; SUBCUTANEOUS at 17:15

## 2022-01-23 RX ADMIN — Medication 0.5 MILLIGRAM(S): at 05:47

## 2022-01-23 RX ADMIN — Medication 1 TABLET(S): at 12:12

## 2022-01-23 RX ADMIN — Medication 3 MILLILITER(S): at 17:15

## 2022-01-23 RX ADMIN — HEPARIN SODIUM 5000 UNIT(S): 5000 INJECTION INTRAVENOUS; SUBCUTANEOUS at 14:47

## 2022-01-23 RX ADMIN — Medication 750 MILLIGRAM(S): at 14:47

## 2022-01-23 RX ADMIN — Medication 3 MILLILITER(S): at 09:53

## 2022-01-23 RX ADMIN — Medication 750 MILLIGRAM(S): at 05:52

## 2022-01-23 RX ADMIN — LACOSAMIDE 200 MILLIGRAM(S): 50 TABLET ORAL at 14:46

## 2022-01-23 RX ADMIN — Medication 220 MILLIGRAM(S): at 12:41

## 2022-01-23 RX ADMIN — Medication 1 APPLICATION(S): at 12:12

## 2022-01-23 RX ADMIN — LEVETIRACETAM 600 MILLIGRAM(S): 250 TABLET, FILM COATED ORAL at 17:16

## 2022-01-23 RX ADMIN — SODIUM CHLORIDE 3 MILLILITER(S): 9 INJECTION INTRAMUSCULAR; INTRAVENOUS; SUBCUTANEOUS at 06:02

## 2022-01-23 RX ADMIN — HEPARIN SODIUM 5000 UNIT(S): 5000 INJECTION INTRAVENOUS; SUBCUTANEOUS at 05:49

## 2022-01-23 RX ADMIN — Medication 3 MILLILITER(S): at 22:25

## 2022-01-23 RX ADMIN — LEVETIRACETAM 600 MILLIGRAM(S): 250 TABLET, FILM COATED ORAL at 05:48

## 2022-01-23 RX ADMIN — Medication 1 APPLICATION(S): at 00:44

## 2022-01-23 RX ADMIN — Medication 3 MILLILITER(S): at 02:20

## 2022-01-23 RX ADMIN — LACOSAMIDE 200 MILLIGRAM(S): 50 TABLET ORAL at 05:49

## 2022-01-23 RX ADMIN — HEPARIN SODIUM 5000 UNIT(S): 5000 INJECTION INTRAVENOUS; SUBCUTANEOUS at 22:24

## 2022-01-23 RX ADMIN — Medication 750 MILLIGRAM(S): at 22:24

## 2022-01-23 RX ADMIN — Medication 3 MILLILITER(S): at 05:47

## 2022-01-23 RX ADMIN — Medication 0.5 MILLIGRAM(S): at 17:15

## 2022-01-23 RX ADMIN — LACOSAMIDE 200 MILLIGRAM(S): 50 TABLET ORAL at 22:24

## 2022-01-23 RX ADMIN — Medication 3 MILLILITER(S): at 14:47

## 2022-01-23 NOTE — ED ADULT NURSE NOTE - NS ED NURSE DISCH DISPOSITION
Patient: Ernestina Brown    Procedure: * No procedures listed *       Diagnosis:* No pre-op diagnosis entered *  Diagnosis Additional Information: No value filed.    Anesthesia Type:  Epidural    Note:  Disposition: Inpatient   Postop Pain Control: Uneventful            Sign Out: Well controlled pain   PONV: No   Neuro/Psych: Uneventful            Sign Out: Acceptable/Baseline neuro status   Airway/Respiratory: Uneventful            Sign Out: Acceptable/Baseline resp. status   CV/Hemodynamics: Uneventful            Sign Out: Acceptable CV status   Other NRE: NONE   DID A NON-ROUTINE EVENT OCCUR? No    Event details/Postop Comments:  Epidural has worn off without complications           Last vitals:  Vitals Value Taken Time   BP     Temp     Pulse     Resp     SpO2         Electronically Signed By: Marko Burns MD  January 23, 2022  12:32 PM  
Discharged

## 2022-01-23 NOTE — PROGRESS NOTE ADULT - PROBLEM SELECTOR PLAN 2
-CT A/P 1/19 in the ED with Questionable retraction of the percutaneous gastrostomy tube into the abdominal wall versus tenting of the stomach. Developing buried bumper syndrome is not excluded.  -Repeat CT A/P with contrast ordered by ED not showing retraction   -restart tube feeds, increasing rate daily per dietician recs

## 2022-01-23 NOTE — CHART NOTE - NSCHARTNOTEFT_GEN_A_CORE
Nutrition Follow Up Note  Patient seen for: nutrition consult " Enteral/parenteral nutrition PTA"    Chart reviewed, events noted. This is a "56F PMH Cerebral palsy(non-verbal(moans), bedbound at baseline),profound intellectual disability seizure d/o, constipation multiple admissions for aspiration PNA(pseudomonas& MRSA in sputum) requiring intubation s/p PEG presenting with worsening cough and hemoptysis"    Source: [] Patient       [x] EMR        [x] RN        [] Family at bedside       [] Other:    -If unable to interview patient: [] Trach/Vent/BiPAP  [x] Disoriented/confused/inappropriate to interview    Diet Order:   Diet, NPO with Tube Feed:   Tube Feeding Modality: Gastrostomy  Jevity 1.2 Shady (JEVITY1.2RTH)  Total Volume for 24 Hours (mL): 1320  Continuous  Starting Tube Feed Rate {mL per Hour}: 10  Increase Tube Feed Rate by (mL): 10     Every 24 hours  Until Goal Tube Feed Rate (mL per Hour): 55  Tube Feed Duration (in Hours): 24  Tube Feed Start Time: 16:20 (01-21-22)    - Is current order appropriate/adequate? [] Yes  [x]  No: pt with tube feeds slowing infusing towards goal rate of 55ml/hr x 24hrs. Pt currently seen with tube feeds running at 20ml/hr.     - Nutrition-related concerns:  -Pt seen tube feeds of Jevity 1.2 infusing at 20ml/hr advancing by 10ml/hr q 24hrs.   -Per RN, pt tolerating EN well so far, appears more alert. No acute GI distress noted.   -Potassium, magnesium WNL, noted with hyperphosphatemia, will continue to monitor trend, adjust EN regimen as needed.      GI:  Last BM 1/23___.   Bowel Regimen? [] Yes   [] No      Weight: no additional weights to assess   68Kg (1/19)    Nutritionally Pertinent MEDICATIONS  (STANDING):  calcium carbonate    500 mG (Tums) Chewable  ferrous    sulfate Liquid  multivitamin/minerals  senna    Pertinent Labs: 01-23 @ 06:47: Na 140, BUN 12, Cr 0.45<L>, BG 82, K+ 4.6, Phos 5.6<H>, Mg 1.8, Alk Phos --, ALT/SGPT --, AST/SGOT --, HbA1c --  01-22 @ 11:14: Na 139, BUN 13, Cr 0.42<L>, BG 96, K+ 4.7, Phos 5.4<H>, Mg 1.8, Alk Phos --, ALT/SGPT --, AST/SGOT --, HbA1c --      Finger Sticks:  POCT Blood Glucose.: 81 mg/dL (01-23 @ 06:00)  POCT Blood Glucose.: 89 mg/dL (01-23 @ 00:07)  POCT Blood Glucose.: 105 mg/dL (01-22 @ 17:59)  POCT Blood Glucose.: 88 mg/dL (01-22 @ 11:57)      Skin per nursing documentation: stage 1 left heel, stage 1 right heel   Edema:  +2 left/right leg     Estimated Needs:   [x] no change since previous assessment  [] recalculated:     Previous Nutrition Diagnosis: Inadequate protein-energy intake  Nutrition Diagnosis is: [x] ongoing  [] resolved [] not applicable --tube feeds not yet at goal rate, being advanced     New Nutrition Diagnosis: [x] Not applicable    Nutrition Care Plan:  [x] In Progress  [] Achieved  [] Not applicable    Nutrition Interventions:     Education Provided:       [] Yes:  [x] No:        Recommendations:         [x] Recommend continue Jevity 1.2 at 20ml/hr increasing by 10ml/hr q6hrs or as tolerate to goal of 55cc/hr x24 hours to provide total volume 1320ml, 1584 kcals, 73 gm protein, and 1065 ml free water. Meets 23 kcal/kG and 1.07 gm protein/kG based on dosing wt 68.0 kG     [x] Continue micronutrient supplementation: multivitamin once daily      [x] Continue to monitor electrolytes, replete as needed.      Monitoring and Evaluation:   Continue to monitor nutritional intake, tolerance to diet prescription, weights, labs, skin integrity      RD remains available upon request and will follow up per protocol  Pascale Coulter RD, CDN, Pager # [phone]

## 2022-01-23 NOTE — PROGRESS NOTE ADULT - SUBJECTIVE AND OBJECTIVE BOX
[facility] Division of Hospital Medicine  Renetta DO Wei  Pager (M-F, 3H-3A): [phone]  Other Times:  [phone]    Patient is a 56y old  Female who presents with a chief complaint of Worsening productive cough, hemoptysis (22 Jan 2022 13:32)      SUBJECTIVE / OVERNIGHT EVENTS: no events. Patient was sleeping, appeared comfortable.  ADDITIONAL REVIEW OF SYSTEMS: negative    MEDICATIONS  (STANDING):  albuterol/ipratropium for Nebulization 3 milliLiter(s) Nebulizer every 4 hours  buDESOnide    Inhalation Suspension 0.5 milliGRAM(s) Inhalation two times a day  calcium carbonate    500 mG (Tums) Chewable 1 Tablet(s) Enteral Tube daily  ferrous    sulfate Liquid 220 milliGRAM(s) Enteral Tube daily  heparin   Injectable 5000 Unit(s) SubCutaneous every 8 hours  lacosamide Solution 200 milliGRAM(s) Oral <User Schedule>  levETIRAcetam  Solution 600 milliGRAM(s) Enteral Tube two times a day  multivitamin/minerals 1 Tablet(s) Oral daily  senna 2 Tablet(s) Oral at bedtime  sodium chloride 0.9% for Nebulization 3 milliLiter(s) Nebulizer two times a day  valproic  acid Syrup 750 milliGRAM(s) Enteral Tube three times a day  vitamin A &amp; D Ointment 1 Application(s) Topical daily    MEDICATIONS  (PRN):  aluminum hydroxide/magnesium hydroxide/simethicone Suspension 30 milliLiter(s) Enteral Tube every 4 hours PRN Dyspepsia  magnesium hydroxide Suspension 30 milliLiter(s) Oral daily PRN Constipation  mineral oil/petrolatum Hydrophilic Ointment 1 Application(s) Topical four times a day PRN Skin care  ondansetron Injectable 4 milliGRAM(s) IV Push every 8 hours PRN Nausea and/or Vomiting  saline laxative (FLEET) Rectal Enema 1 Enema Rectal once PRN constipation      CAPILLARY BLOOD GLUCOSE      POCT Blood Glucose.: 85 mg/dL (23 Jan 2022 13:14)  POCT Blood Glucose.: 81 mg/dL (23 Jan 2022 06:00)  POCT Blood Glucose.: 89 mg/dL (23 Jan 2022 00:07)  POCT Blood Glucose.: 105 mg/dL (22 Jan 2022 17:59)    I&O's Summary    22 Jan 2022 07:01  -  23 Jan 2022 07:00  --------------------------------------------------------  IN: 90 mL / OUT: 10 mL / NET: 80 mL    23 Jan 2022 07:01  -  23 Jan 2022 13:32  --------------------------------------------------------  IN: 210 mL / OUT: 0 mL / NET: 210 mL        PHYSICAL EXAM:  Vital Signs Last 24 Hrs  T(C): 36.4 (23 Jan 2022 11:44), Max: 36.6 (22 Jan 2022 15:38)  T(F): 97.5 (23 Jan 2022 11:44), Max: 97.8 (22 Jan 2022 15:38)  HR: 86 (23 Jan 2022 11:44) (73 - 86)  BP: 125/68 (23 Jan 2022 11:44) (110/68 - 127/67)  BP(mean): --  RR: 18 (23 Jan 2022 11:44) (16 - 18)  SpO2: 92% (23 Jan 2022 11:44) (92% - 98%)    CONSTITUTIONAL: Well-groomed, in no apparent distress  EYES: No conjunctival or scleral injection, non-icteric; PERRLA and symmetric  ENMT: No external nasal lesions; no pharyngeal injection or exudates, oral mucosa with moist membranes  NECK: Trachea midline without palpable neck mass; thyroid not enlarged and non-tender  RESPIRATORY: Breathing comfortably; decreased breath sounds b/l  CARDIOVASCULAR: +S1S2, RRR, no M/G/R; pedal pulses full and symmetric; no lower extremity edema  GASTROINTESTINAL: No palpable masses or tenderness, +BS throughout, no rebound/guarding; no hepatosplenomegaly; PEG in place - clean  MUSCULOSKELETAL: no digital clubbing or cyanosis;   SKIN: No rashes or ulcers noted; no subcutaneous nodules or induration palpable  PSYCHIATRIC: sleeping, but awakens easily, nonverbal, unable to follow directions    LABS:                        12.7   5.23  )-----------( 80       ( 23 Jan 2022 06:59 )             39.2     01-23    140  |  97  |  12  ----------------------------<  82  4.6   |  32<H>  |  0.45<L>    Ca    9.4      23 Jan 2022 06:47  Phos  5.6     01-23  Mg     1.8     01-23

## 2022-01-24 LAB
ANION GAP SERPL CALC-SCNC: 10 MMOL/L — SIGNIFICANT CHANGE UP (ref 5–17)
ANION GAP SERPL CALC-SCNC: 9 MMOL/L — SIGNIFICANT CHANGE UP (ref 5–17)
BUN SERPL-MCNC: 12 MG/DL — SIGNIFICANT CHANGE UP (ref 7–23)
BUN SERPL-MCNC: 12 MG/DL — SIGNIFICANT CHANGE UP (ref 7–23)
CALCIUM SERPL-MCNC: 8.7 MG/DL — SIGNIFICANT CHANGE UP (ref 8.4–10.5)
CALCIUM SERPL-MCNC: 8.9 MG/DL — SIGNIFICANT CHANGE UP (ref 8.4–10.5)
CHLORIDE SERPL-SCNC: 97 MMOL/L — SIGNIFICANT CHANGE UP (ref 96–108)
CHLORIDE SERPL-SCNC: 98 MMOL/L — SIGNIFICANT CHANGE UP (ref 96–108)
CO2 SERPL-SCNC: 31 MMOL/L — SIGNIFICANT CHANGE UP (ref 22–31)
CO2 SERPL-SCNC: 34 MMOL/L — HIGH (ref 22–31)
CREAT SERPL-MCNC: 0.41 MG/DL — LOW (ref 0.5–1.3)
CREAT SERPL-MCNC: 0.45 MG/DL — LOW (ref 0.5–1.3)
GLUCOSE BLDC GLUCOMTR-MCNC: 102 MG/DL — HIGH (ref 70–99)
GLUCOSE BLDC GLUCOMTR-MCNC: 109 MG/DL — HIGH (ref 70–99)
GLUCOSE BLDC GLUCOMTR-MCNC: 75 MG/DL — SIGNIFICANT CHANGE UP (ref 70–99)
GLUCOSE BLDC GLUCOMTR-MCNC: 83 MG/DL — SIGNIFICANT CHANGE UP (ref 70–99)
GLUCOSE BLDC GLUCOMTR-MCNC: 86 MG/DL — SIGNIFICANT CHANGE UP (ref 70–99)
GLUCOSE SERPL-MCNC: 107 MG/DL — HIGH (ref 70–99)
GLUCOSE SERPL-MCNC: 99 MG/DL — SIGNIFICANT CHANGE UP (ref 70–99)
HCT VFR BLD CALC: 35.9 % — SIGNIFICANT CHANGE UP (ref 34.5–45)
HGB BLD-MCNC: 11.9 G/DL — SIGNIFICANT CHANGE UP (ref 11.5–15.5)
LEVETIRACETAM SERPL-MCNC: 13.1 UG/ML — SIGNIFICANT CHANGE UP (ref 10–40)
MAGNESIUM SERPL-MCNC: 1.8 MG/DL — SIGNIFICANT CHANGE UP (ref 1.6–2.6)
MCHC RBC-ENTMCNC: 33.1 GM/DL — SIGNIFICANT CHANGE UP (ref 32–36)
MCHC RBC-ENTMCNC: 35.3 PG — HIGH (ref 27–34)
MCV RBC AUTO: 106.5 FL — HIGH (ref 80–100)
NRBC # BLD: 0 /100 WBCS — SIGNIFICANT CHANGE UP (ref 0–0)
PHOSPHATE SERPL-MCNC: 5.3 MG/DL — HIGH (ref 2.5–4.5)
PLATELET # BLD AUTO: 102 K/UL — LOW (ref 150–400)
POTASSIUM SERPL-MCNC: 4.5 MMOL/L — SIGNIFICANT CHANGE UP (ref 3.5–5.3)
POTASSIUM SERPL-MCNC: 5.6 MMOL/L — HIGH (ref 3.5–5.3)
POTASSIUM SERPL-SCNC: 4.5 MMOL/L — SIGNIFICANT CHANGE UP (ref 3.5–5.3)
POTASSIUM SERPL-SCNC: 5.6 MMOL/L — HIGH (ref 3.5–5.3)
RBC # BLD: 3.37 M/UL — LOW (ref 3.8–5.2)
RBC # FLD: 15 % — HIGH (ref 10.3–14.5)
SARS-COV-2 RNA SPEC QL NAA+PROBE: SIGNIFICANT CHANGE UP
SODIUM SERPL-SCNC: 138 MMOL/L — SIGNIFICANT CHANGE UP (ref 135–145)
SODIUM SERPL-SCNC: 141 MMOL/L — SIGNIFICANT CHANGE UP (ref 135–145)
WBC # BLD: 4.6 K/UL — SIGNIFICANT CHANGE UP (ref 3.8–10.5)
WBC # FLD AUTO: 4.6 K/UL — SIGNIFICANT CHANGE UP (ref 3.8–10.5)

## 2022-01-24 PROCEDURE — 99233 SBSQ HOSP IP/OBS HIGH 50: CPT

## 2022-01-24 RX ADMIN — Medication 750 MILLIGRAM(S): at 21:24

## 2022-01-24 RX ADMIN — SENNA PLUS 2 TABLET(S): 8.6 TABLET ORAL at 21:25

## 2022-01-24 RX ADMIN — Medication 0.5 MILLIGRAM(S): at 17:34

## 2022-01-24 RX ADMIN — LEVETIRACETAM 600 MILLIGRAM(S): 250 TABLET, FILM COATED ORAL at 06:08

## 2022-01-24 RX ADMIN — Medication 3 MILLILITER(S): at 06:05

## 2022-01-24 RX ADMIN — Medication 3 MILLILITER(S): at 02:50

## 2022-01-24 RX ADMIN — Medication 3 MILLILITER(S): at 21:25

## 2022-01-24 RX ADMIN — Medication 1 TABLET(S): at 17:35

## 2022-01-24 RX ADMIN — HEPARIN SODIUM 5000 UNIT(S): 5000 INJECTION INTRAVENOUS; SUBCUTANEOUS at 06:06

## 2022-01-24 RX ADMIN — Medication 0.5 MILLIGRAM(S): at 06:05

## 2022-01-24 RX ADMIN — HEPARIN SODIUM 5000 UNIT(S): 5000 INJECTION INTRAVENOUS; SUBCUTANEOUS at 14:36

## 2022-01-24 RX ADMIN — HEPARIN SODIUM 5000 UNIT(S): 5000 INJECTION INTRAVENOUS; SUBCUTANEOUS at 21:25

## 2022-01-24 RX ADMIN — Medication 750 MILLIGRAM(S): at 06:06

## 2022-01-24 RX ADMIN — Medication 1 APPLICATION(S): at 11:28

## 2022-01-24 RX ADMIN — Medication 220 MILLIGRAM(S): at 21:25

## 2022-01-24 RX ADMIN — Medication 3 MILLILITER(S): at 14:35

## 2022-01-24 RX ADMIN — LACOSAMIDE 200 MILLIGRAM(S): 50 TABLET ORAL at 16:08

## 2022-01-24 RX ADMIN — SODIUM CHLORIDE 3 MILLILITER(S): 9 INJECTION INTRAMUSCULAR; INTRAVENOUS; SUBCUTANEOUS at 06:05

## 2022-01-24 RX ADMIN — Medication 1 TABLET(S): at 11:16

## 2022-01-24 RX ADMIN — Medication 750 MILLIGRAM(S): at 14:36

## 2022-01-24 RX ADMIN — SODIUM CHLORIDE 3 MILLILITER(S): 9 INJECTION INTRAMUSCULAR; INTRAVENOUS; SUBCUTANEOUS at 17:35

## 2022-01-24 RX ADMIN — LACOSAMIDE 200 MILLIGRAM(S): 50 TABLET ORAL at 06:08

## 2022-01-24 RX ADMIN — LEVETIRACETAM 600 MILLIGRAM(S): 250 TABLET, FILM COATED ORAL at 17:35

## 2022-01-24 RX ADMIN — LACOSAMIDE 200 MILLIGRAM(S): 50 TABLET ORAL at 21:24

## 2022-01-24 RX ADMIN — Medication 3 MILLILITER(S): at 11:15

## 2022-01-24 RX ADMIN — Medication 3 MILLILITER(S): at 17:37

## 2022-01-24 NOTE — PROGRESS NOTE ADULT - SUBJECTIVE AND OBJECTIVE BOX
Patient is a 56y old  Female who presents with a chief complaint of Worsening productive cough, hemoptysis (23 Jan 2022 13:32)      SUBJECTIVE / OVERNIGHT EVENTS: appears comfortable     MEDICATIONS  (STANDING):  albuterol/ipratropium for Nebulization 3 milliLiter(s) Nebulizer every 4 hours  buDESOnide    Inhalation Suspension 0.5 milliGRAM(s) Inhalation two times a day  calcium carbonate    500 mG (Tums) Chewable 1 Tablet(s) Enteral Tube daily  ferrous    sulfate Liquid 220 milliGRAM(s) Enteral Tube at bedtime  heparin   Injectable 5000 Unit(s) SubCutaneous every 8 hours  lacosamide Solution 200 milliGRAM(s) Oral <User Schedule>  levETIRAcetam  Solution 600 milliGRAM(s) Enteral Tube two times a day  multivitamin/minerals 1 Tablet(s) Oral daily  senna 2 Tablet(s) Oral at bedtime  sodium chloride 0.9% for Nebulization 3 milliLiter(s) Nebulizer two times a day  valproic  acid Syrup 750 milliGRAM(s) Enteral Tube three times a day  vitamin A &amp; D Ointment 1 Application(s) Topical daily    MEDICATIONS  (PRN):  aluminum hydroxide/magnesium hydroxide/simethicone Suspension 30 milliLiter(s) Enteral Tube every 4 hours PRN Dyspepsia  magnesium hydroxide Suspension 30 milliLiter(s) Oral daily PRN Constipation  mineral oil/petrolatum Hydrophilic Ointment 1 Application(s) Topical four times a day PRN Skin care  ondansetron Injectable 4 milliGRAM(s) IV Push every 8 hours PRN Nausea and/or Vomiting  saline laxative (FLEET) Rectal Enema 1 Enema Rectal once PRN constipation        CAPILLARY BLOOD GLUCOSE      POCT Blood Glucose.: 109 mg/dL (24 Jan 2022 11:53)  POCT Blood Glucose.: 75 mg/dL (24 Jan 2022 06:27)  POCT Blood Glucose.: 83 mg/dL (24 Jan 2022 00:28)  POCT Blood Glucose.: 96 mg/dL (23 Jan 2022 17:41)    I&O's Summary    23 Jan 2022 07:01  -  24 Jan 2022 07:00  --------------------------------------------------------  IN: 750 mL / OUT: 200 mL / NET: 550 mL        PHYSICAL EXAM:  GENERAL: NAD, well-developed  HEAD:  Atraumatic, Normocephalic  EYES: conjunctiva and sclera clear  NECK: Supple, No JVD  CHEST/LUNG: Clear to auscultation bilaterally; No wheeze  HEART: Regular rate and rhythm;S1S2  ABDOMEN: Soft, Nontender, Nondistended; Bowel sounds present, +peg no erythema   EXTREMITIES:  2+ Peripheral Pulses      LABS:                        11.9   4.60  )-----------( 102      ( 24 Jan 2022 10:09 )             35.9     01-24    138  |  97  |  12  ----------------------------<  107<H>  5.6<H>   |  31  |  0.41<L>    Ca    8.9      24 Jan 2022 10:09  Phos  5.3     01-24  Mg     1.8     01-24                RADIOLOGY & ADDITIONAL TESTS:    Imaging Personally Reviewed:    Consultant(s) Notes Reviewed:      Care Discussed with Consultants/Other Providers:

## 2022-01-25 LAB
ANION GAP SERPL CALC-SCNC: 12 MMOL/L — SIGNIFICANT CHANGE UP (ref 5–17)
BUN SERPL-MCNC: 15 MG/DL — SIGNIFICANT CHANGE UP (ref 7–23)
CALCIUM SERPL-MCNC: 8.9 MG/DL — SIGNIFICANT CHANGE UP (ref 8.4–10.5)
CHLORIDE SERPL-SCNC: 94 MMOL/L — LOW (ref 96–108)
CO2 SERPL-SCNC: 28 MMOL/L — SIGNIFICANT CHANGE UP (ref 22–31)
CREAT SERPL-MCNC: 0.44 MG/DL — LOW (ref 0.5–1.3)
GLUCOSE BLDC GLUCOMTR-MCNC: 108 MG/DL — HIGH (ref 70–99)
GLUCOSE BLDC GLUCOMTR-MCNC: 116 MG/DL — HIGH (ref 70–99)
GLUCOSE BLDC GLUCOMTR-MCNC: 133 MG/DL — HIGH (ref 70–99)
GLUCOSE BLDC GLUCOMTR-MCNC: 97 MG/DL — SIGNIFICANT CHANGE UP (ref 70–99)
GLUCOSE SERPL-MCNC: 64 MG/DL — LOW (ref 70–99)
HCT VFR BLD CALC: 36 % — SIGNIFICANT CHANGE UP (ref 34.5–45)
HGB BLD-MCNC: 11.5 G/DL — SIGNIFICANT CHANGE UP (ref 11.5–15.5)
MAGNESIUM SERPL-MCNC: 1.9 MG/DL — SIGNIFICANT CHANGE UP (ref 1.6–2.6)
MCHC RBC-ENTMCNC: 31.9 GM/DL — LOW (ref 32–36)
MCHC RBC-ENTMCNC: 34.7 PG — HIGH (ref 27–34)
MCV RBC AUTO: 108.8 FL — HIGH (ref 80–100)
NRBC # BLD: 0 /100 WBCS — SIGNIFICANT CHANGE UP (ref 0–0)
PHOSPHATE SERPL-MCNC: 4.4 MG/DL — SIGNIFICANT CHANGE UP (ref 2.5–4.5)
PLATELET # BLD AUTO: 88 K/UL — LOW (ref 150–400)
POTASSIUM SERPL-MCNC: 5.1 MMOL/L — SIGNIFICANT CHANGE UP (ref 3.5–5.3)
POTASSIUM SERPL-SCNC: 5.1 MMOL/L — SIGNIFICANT CHANGE UP (ref 3.5–5.3)
RBC # BLD: 3.31 M/UL — LOW (ref 3.8–5.2)
RBC # FLD: 15 % — HIGH (ref 10.3–14.5)
SODIUM SERPL-SCNC: 134 MMOL/L — LOW (ref 135–145)
WBC # BLD: 3.82 K/UL — SIGNIFICANT CHANGE UP (ref 3.8–10.5)
WBC # FLD AUTO: 3.82 K/UL — SIGNIFICANT CHANGE UP (ref 3.8–10.5)

## 2022-01-25 PROCEDURE — 99233 SBSQ HOSP IP/OBS HIGH 50: CPT

## 2022-01-25 PROCEDURE — 99223 1ST HOSP IP/OBS HIGH 75: CPT | Mod: GC

## 2022-01-25 RX ADMIN — LACOSAMIDE 200 MILLIGRAM(S): 50 TABLET ORAL at 06:07

## 2022-01-25 RX ADMIN — LACOSAMIDE 200 MILLIGRAM(S): 50 TABLET ORAL at 21:58

## 2022-01-25 RX ADMIN — Medication 1 TABLET(S): at 06:06

## 2022-01-25 RX ADMIN — SODIUM CHLORIDE 3 MILLILITER(S): 9 INJECTION INTRAMUSCULAR; INTRAVENOUS; SUBCUTANEOUS at 06:05

## 2022-01-25 RX ADMIN — Medication 0.5 MILLIGRAM(S): at 17:14

## 2022-01-25 RX ADMIN — SENNA PLUS 2 TABLET(S): 8.6 TABLET ORAL at 21:58

## 2022-01-25 RX ADMIN — Medication 3 MILLILITER(S): at 06:05

## 2022-01-25 RX ADMIN — Medication 750 MILLIGRAM(S): at 06:04

## 2022-01-25 RX ADMIN — Medication 750 MILLIGRAM(S): at 21:59

## 2022-01-25 RX ADMIN — Medication 1 APPLICATION(S): at 11:30

## 2022-01-25 RX ADMIN — Medication 3 MILLILITER(S): at 17:13

## 2022-01-25 RX ADMIN — Medication 750 MILLIGRAM(S): at 13:09

## 2022-01-25 RX ADMIN — SODIUM CHLORIDE 3 MILLILITER(S): 9 INJECTION INTRAMUSCULAR; INTRAVENOUS; SUBCUTANEOUS at 17:14

## 2022-01-25 RX ADMIN — Medication 1 TABLET(S): at 11:30

## 2022-01-25 RX ADMIN — Medication 3 MILLILITER(S): at 02:23

## 2022-01-25 RX ADMIN — LACOSAMIDE 200 MILLIGRAM(S): 50 TABLET ORAL at 16:06

## 2022-01-25 RX ADMIN — Medication 220 MILLIGRAM(S): at 21:58

## 2022-01-25 RX ADMIN — LEVETIRACETAM 600 MILLIGRAM(S): 250 TABLET, FILM COATED ORAL at 17:12

## 2022-01-25 RX ADMIN — Medication 3 MILLILITER(S): at 13:09

## 2022-01-25 RX ADMIN — HEPARIN SODIUM 5000 UNIT(S): 5000 INJECTION INTRAVENOUS; SUBCUTANEOUS at 06:05

## 2022-01-25 RX ADMIN — Medication 3 MILLILITER(S): at 10:07

## 2022-01-25 RX ADMIN — Medication 3 MILLILITER(S): at 22:05

## 2022-01-25 RX ADMIN — LEVETIRACETAM 600 MILLIGRAM(S): 250 TABLET, FILM COATED ORAL at 06:05

## 2022-01-25 NOTE — PROGRESS NOTE ADULT - SUBJECTIVE AND OBJECTIVE BOX
Patient is a 56y old  Female who presents with a chief complaint of Worsening productive cough, hemoptysis (25 Jan 2022 13:28)      SUBJECTIVE / OVERNIGHT EVENTS: appears comfrotable     MEDICATIONS  (STANDING):  albuterol/ipratropium for Nebulization 3 milliLiter(s) Nebulizer every 4 hours  buDESOnide    Inhalation Suspension 0.5 milliGRAM(s) Inhalation two times a day  calcium carbonate    500 mG (Tums) Chewable 1 Tablet(s) Enteral Tube daily  ferrous    sulfate Liquid 220 milliGRAM(s) Enteral Tube at bedtime  lacosamide Solution 200 milliGRAM(s) Oral <User Schedule>  levETIRAcetam  Solution 600 milliGRAM(s) Enteral Tube two times a day  multivitamin/minerals 1 Tablet(s) Oral daily  senna 2 Tablet(s) Oral at bedtime  sodium chloride 0.9% for Nebulization 3 milliLiter(s) Nebulizer two times a day  valproic  acid Syrup 750 milliGRAM(s) Enteral Tube three times a day  vitamin A &amp; D Ointment 1 Application(s) Topical daily    MEDICATIONS  (PRN):  aluminum hydroxide/magnesium hydroxide/simethicone Suspension 30 milliLiter(s) Enteral Tube every 4 hours PRN Dyspepsia  magnesium hydroxide Suspension 30 milliLiter(s) Oral daily PRN Constipation  mineral oil/petrolatum Hydrophilic Ointment 1 Application(s) Topical four times a day PRN Skin care  ondansetron Injectable 4 milliGRAM(s) IV Push every 8 hours PRN Nausea and/or Vomiting  saline laxative (FLEET) Rectal Enema 1 Enema Rectal once PRN constipation        CAPILLARY BLOOD GLUCOSE      POCT Blood Glucose.: 108 mg/dL (25 Jan 2022 12:14)  POCT Blood Glucose.: 97 mg/dL (25 Jan 2022 06:12)  POCT Blood Glucose.: 102 mg/dL (24 Jan 2022 23:51)  POCT Blood Glucose.: 86 mg/dL (24 Jan 2022 17:40)    I&O's Summary    24 Jan 2022 07:01  -  25 Jan 2022 07:00  --------------------------------------------------------  IN: 0 mL / OUT: 400 mL / NET: -400 mL        PHYSICAL EXAM:  GENERAL: NAD, well-developed  HEAD:  Atraumatic, Normocephalic  EYES: conjunctiva and sclera clear  NECK: Supple, No JVD  CHEST/LUNG: +rhonchi  HEART: Regular rate and rhythm;S1S2  ABDOMEN: Soft, Nontender, Nondistended; Bowel sounds present, +peg no erythema   EXTREMITIES:  2+ Peripheral Pulses  LABS:                        11.5   3.82  )-----------( 88       ( 25 Jan 2022 07:39 )             36.0     01-25    134<L>  |  94<L>  |  15  ----------------------------<  64<L>  5.1   |  28  |  0.44<L>    Ca    8.9      25 Jan 2022 07:39  Phos  4.4     01-25  Mg     1.9     01-25                RADIOLOGY & ADDITIONAL TESTS:    Imaging Personally Reviewed:    Consultant(s) Notes Reviewed:      Care Discussed with Consultants/Other Providers:

## 2022-01-25 NOTE — CONSULT NOTE ADULT - SUBJECTIVE AND OBJECTIVE BOX
CHIEF COMPLAINT: Productive cough, hemoptysis    HPI:  57 yo F PMH CP (non-verbal baseline, bedbound), intellectual disability, seizure disorder, constipation, prior hospitalizations for pseudomonas/MRSA pneumonia and aspiration requiring PEG tube presented from outpatient facility for cough and hemoptysis. Noted to have     PAST MEDICAL & SURGICAL HISTORY:  Cerebral palsy    Seizure disorder    Constipation    Intellectual disability    Asthma    Gastrostomy tube dependent        FAMILY HISTORY:  Family history unknown  unable to obtain d/t baseline intellectual disability        SOCIAL HISTORY:  Smoking: [ ] Never Smoked [ ] Former Smoker (__ packs x ___ years) [ ] Current Smoker  (__ packs x ___ years)  Substance Use: [ ] Never Used [ ] Used ____  EtOH Use:  Marital Status: [ ] Single [ ]  [ ]  [ ]   Sexual History:   Occupation:  Recent Travel:  Country of Birth:  Advance Directives:    Allergies    Topamax (Unknown)    Intolerances        HOME MEDICATIONS:  Home Medications:  albuterol 2.5 mg/3 mL (0.083%) inhalation solution: 3 milliliter(s) inhaled every 4 hours, As Needed (24 Jan 2022 11:10)  Aquaphor topical ointment: Apply topically to affected area 4 times a day, As Needed for dry lips (24 Jan 2022 11:10)  Bactrim: 10 milliliter(s) by gastrostomy tube once a day (24 Jan 2022 11:10)  budesonide 0.5 mg/2 mL inhalation suspension: 2 milliliter(s) inhaled 2 times a day (24 Jan 2022 11:10)  calcium carbonate 600 mg oral tablet, chewable: 1 tab(s) orally once a day (in the morning) (24 Jan 2022 11:10)  emollients, topical ointment: 1 application topically 4 times a day, As needed, Skin care (24 Jan 2022 11:10)  ferrous sulfate 220 mg/5 mL (44 mg/5 mL elemental iron) oral elixir: 5 milliliter(s) orally once a day (at bedtime) (24 Jan 2022 11:10)  Fleet Enema 19 g-7 g rectal enema: 133 milliliter(s) rectal once  on day 3 evening of no BM (24 Jan 2022 11:10)  Fosamax 70 mg oral tablet: 1 tab(s) by gastrostomy tube once a week (24 Jan 2022 11:10)  Keppra 100 mg/mL oral solution: 6 milliliter(s) via PEG 2 times a day (24 Jan 2022 11:10)  ketoconazole 2% topical shampoo: Apply topically to scalp 3 times a week for seborrheic dermatitis (24 Jan 2022 11:10)  metroNIDAZOLE 0.75% topical lotion: Apply topically to face 2 times a day (24 Jan 2022 11:10)  Milk of Magnesia: 30 milliliter(s) orally once a day, As Needed (24 Jan 2022 11:10)  Nayzilam 5 mg/inh nasal spray: 1 puff(s) nasal once for seizure lasting &lt;4mins (24 Jan 2022 11:10)  Thera M oral tablet: 1 tab(s) by gastrostomy tube once a day (in the afternoon) (24 Jan 2022 11:10)  valproic acid 250 mg/5 mL oral liquid: 15 milliliter(s) by gastrostomy tube 3 times a day (24 Jan 2022 11:10)  vitamin A and D topical ointment: Apply topically to affected area once a day to healed wound behind left knee (24 Jan 2022 11:10)      REVIEW OF SYSTEMS:  Constitutional: [ ] negative [ ] fevers [ ] chills [ ] weight loss [ ] weight gain  HEENT: [ ] negative [ ] dry eyes [ ] eye irritation [ ] postnasal drip [ ] nasal congestion  CV: [ ] negative  [ ] chest pain [ ] orthopnea [ ] palpitations [ ] murmur  Resp: [ ] negative [ ] cough [ ] shortness of breath [ ] dyspnea [ ] wheezing [ ] sputum [ ] hemoptysis  GI: [ ] negative [ ] nausea [ ] vomiting [ ] diarrhea [ ] constipation [ ] abd pain [ ] dysphagia   : [ ] negative [ ] dysuria [ ] nocturia [ ] hematuria [ ] increased urinary frequency  Musculoskeletal: [ ] negative [ ] back pain [ ] myalgias [ ] arthralgias [ ] fracture  Skin: [ ] negative [ ] rash [ ] itch  Neurological: [ ] negative [ ] headache [ ] dizziness [ ] syncope [ ] weakness [ ] numbness  Psychiatric: [ ] negative [ ] anxiety [ ] depression  Endocrine: [ ] negative [ ] diabetes [ ] thyroid problem  Hematologic/Lymphatic: [ ] negative [ ] anemia [ ] bleeding problem  Allergic/Immunologic: [ ] negative [ ] itchy eyes [ ] nasal discharge [ ] hives [ ] angioedema  [ ] All other systems negative  [ ] Unable to assess ROS because ________    OBJECTIVE:  ICU Vital Signs Last 24 Hrs  T(C): 36.3 (25 Jan 2022 04:40), Max: 36.3 (24 Jan 2022 21:03)  T(F): 97.3 (25 Jan 2022 04:40), Max: 97.3 (24 Jan 2022 21:03)  HR: 99 (25 Jan 2022 04:40) (80 - 99)  BP: 131/76 (25 Jan 2022 04:40) (118/76 - 132/84)  BP(mean): --  ABP: --  ABP(mean): --  RR: 18 (25 Jan 2022 04:40) (18 - 18)  SpO2: 93% (25 Jan 2022 04:40) (93% - 98%)        01-24 @ 07:01  -  01-25 @ 07:00  --------------------------------------------------------  IN: 0 mL / OUT: 400 mL / NET: -400 mL      CAPILLARY BLOOD GLUCOSE      POCT Blood Glucose.: 108 mg/dL (25 Jan 2022 12:14)      PHYSICAL EXAM:  General:   HEENT:   Lymph Nodes:  Neck:   Respiratory:   Cardiovascular:   Abdomen:   Extremities:   Skin:   Neurological:  Psychiatry:    HOSPITAL MEDICATIONS:  Standing Meds:  albuterol/ipratropium for Nebulization 3 milliLiter(s) Nebulizer every 4 hours  buDESOnide    Inhalation Suspension 0.5 milliGRAM(s) Inhalation two times a day  calcium carbonate    500 mG (Tums) Chewable 1 Tablet(s) Enteral Tube daily  ferrous    sulfate Liquid 220 milliGRAM(s) Enteral Tube at bedtime  lacosamide Solution 200 milliGRAM(s) Oral <User Schedule>  levETIRAcetam  Solution 600 milliGRAM(s) Enteral Tube two times a day  multivitamin/minerals 1 Tablet(s) Oral daily  senna 2 Tablet(s) Oral at bedtime  sodium chloride 0.9% for Nebulization 3 milliLiter(s) Nebulizer two times a day  valproic  acid Syrup 750 milliGRAM(s) Enteral Tube three times a day  vitamin A &amp; D Ointment 1 Application(s) Topical daily      PRN Meds:  aluminum hydroxide/magnesium hydroxide/simethicone Suspension 30 milliLiter(s) Enteral Tube every 4 hours PRN  magnesium hydroxide Suspension 30 milliLiter(s) Oral daily PRN  mineral oil/petrolatum Hydrophilic Ointment 1 Application(s) Topical four times a day PRN  ondansetron Injectable 4 milliGRAM(s) IV Push every 8 hours PRN  saline laxative (FLEET) Rectal Enema 1 Enema Rectal once PRN      LABS:                        11.5   3.82  )-----------( 88       ( 25 Jan 2022 07:39 )             36.0     Hgb Trend: 11.5<--, 11.9<--, 12.7<--, 12.1<--, 13.5<--  01-25    134<L>  |  94<L>  |  15  ----------------------------<  64<L>  5.1   |  28  |  0.44<L>    Ca    8.9      25 Jan 2022 07:39  Phos  4.4     01-25  Mg     1.9     01-25      Creatinine Trend: 0.44<--, 0.45<--, 0.41<--, 0.45<--, 0.42<--, 0.48<--            MICROBIOLOGY:       RADIOLOGY:  [ ] Reviewed and interpreted by me    PULMONARY FUNCTION TESTS:    EKG: CHIEF COMPLAINT: Productive cough, hemoptysis    HPI:  55 yo F PMH CP (non-verbal baseline, bedbound), intellectual disability, seizure disorder, constipation, prior hospitalizations for pseudomonas/MRSA pneumonia and aspiration requiring PEG tube presented from outpatient facility for cough and hemoptysis. Noted to have worsening of bronchi cough at facility w/ thick phlegm and streaks of blood. Also c/f increased congestion w/ difficulty clearing secretions. Given symptoms cailin to ED. Vitals on arrival notable for mild tachycardia and hypoxemia requiring NRB/4LNC. Admitted to medicine for further monitoring. During hospitalization no further episodes of hemoptysis. Weaned from 4LNC to 2LNC. CT Chest obtained w/ e/o basilar atelectasis w/ unchanged tree-in-bud nodules c/f chronic mucous plugging as well as stable 0.5cm stable LLL nodule. Pulmonary consulted for question of how to manage secretions as well as evaluation for likelihood of long term secretions.     PAST MEDICAL & SURGICAL HISTORY:  Cerebral palsy    Seizure disorder    Constipation    Intellectual disability    Asthma    Gastrostomy tube dependent        FAMILY HISTORY:  Family history unknown  unable to obtain d/t baseline intellectual disability        SOCIAL HISTORY:  Unable to obtain as pt not participating in conversation    Allergies    Topamax (Unknown)    Intolerances        HOME MEDICATIONS:  Home Medications:  albuterol 2.5 mg/3 mL (0.083%) inhalation solution: 3 milliliter(s) inhaled every 4 hours, As Needed (24 Jan 2022 11:10)  Aquaphor topical ointment: Apply topically to affected area 4 times a day, As Needed for dry lips (24 Jan 2022 11:10)  Bactrim: 10 milliliter(s) by gastrostomy tube once a day (24 Jan 2022 11:10)  budesonide 0.5 mg/2 mL inhalation suspension: 2 milliliter(s) inhaled 2 times a day (24 Jan 2022 11:10)  calcium carbonate 600 mg oral tablet, chewable: 1 tab(s) orally once a day (in the morning) (24 Jan 2022 11:10)  emollients, topical ointment: 1 application topically 4 times a day, As needed, Skin care (24 Jan 2022 11:10)  ferrous sulfate 220 mg/5 mL (44 mg/5 mL elemental iron) oral elixir: 5 milliliter(s) orally once a day (at bedtime) (24 Jan 2022 11:10)  Fleet Enema 19 g-7 g rectal enema: 133 milliliter(s) rectal once  on day 3 evening of no BM (24 Jan 2022 11:10)  Fosamax 70 mg oral tablet: 1 tab(s) by gastrostomy tube once a week (24 Jan 2022 11:10)  Keppra 100 mg/mL oral solution: 6 milliliter(s) via PEG 2 times a day (24 Jan 2022 11:10)  ketoconazole 2% topical shampoo: Apply topically to scalp 3 times a week for seborrheic dermatitis (24 Jan 2022 11:10)  metroNIDAZOLE 0.75% topical lotion: Apply topically to face 2 times a day (24 Jan 2022 11:10)  Milk of Magnesia: 30 milliliter(s) orally once a day, As Needed (24 Jan 2022 11:10)  Nayzilam 5 mg/inh nasal spray: 1 puff(s) nasal once for seizure lasting &lt;4mins (24 Jan 2022 11:10)  Thera M oral tablet: 1 tab(s) by gastrostomy tube once a day (in the afternoon) (24 Jan 2022 11:10)  valproic acid 250 mg/5 mL oral liquid: 15 milliliter(s) by gastrostomy tube 3 times a day (24 Jan 2022 11:10)  vitamin A and D topical ointment: Apply topically to affected area once a day to healed wound behind left knee (24 Jan 2022 11:10)      REVIEW OF SYSTEMS:  Constitutional: [ ] negative [ ] fevers [ ] chills [ ] weight loss [ ] weight gain  HEENT: [ ] negative [ ] dry eyes [ ] eye irritation [ ] postnasal drip [ ] nasal congestion  CV: [ ] negative  [ ] chest pain [ ] orthopnea [ ] palpitations [ ] murmur  Resp: [ ] negative [ ] cough [ ] shortness of breath [ ] dyspnea [ ] wheezing [ ] sputum [ ] hemoptysis  GI: [ ] negative [ ] nausea [ ] vomiting [ ] diarrhea [ ] constipation [ ] abd pain [ ] dysphagia   : [ ] negative [ ] dysuria [ ] nocturia [ ] hematuria [ ] increased urinary frequency  Musculoskeletal: [ ] negative [ ] back pain [ ] myalgias [ ] arthralgias [ ] fracture  Skin: [ ] negative [ ] rash [ ] itch  Neurological: [ ] negative [ ] headache [ ] dizziness [ ] syncope [ ] weakness [ ] numbness  Psychiatric: [ ] negative [ ] anxiety [ ] depression  Endocrine: [ ] negative [ ] diabetes [ ] thyroid problem  Hematologic/Lymphatic: [ ] negative [ ] anemia [ ] bleeding problem  Allergic/Immunologic: [ ] negative [ ] itchy eyes [ ] nasal discharge [ ] hives [ ] angioedema  [ ] All other systems negative  [x] Unable to assess ROS because ____not participating in conversation____    OBJECTIVE:  ICU Vital Signs Last 24 Hrs  T(C): 36.3 (25 Jan 2022 04:40), Max: 36.3 (24 Jan 2022 21:03)  T(F): 97.3 (25 Jan 2022 04:40), Max: 97.3 (24 Jan 2022 21:03)  HR: 99 (25 Jan 2022 04:40) (80 - 99)  BP: 131/76 (25 Jan 2022 04:40) (118/76 - 132/84)  BP(mean): --  ABP: --  ABP(mean): --  RR: 18 (25 Jan 2022 04:40) (18 - 18)  SpO2: 93% (25 Jan 2022 04:40) (93% - 98%)        01-24 @ 07:01  -  01-25 @ 07:00  --------------------------------------------------------  IN: 0 mL / OUT: 400 mL / NET: -400 mL      CAPILLARY BLOOD GLUCOSE      POCT Blood Glucose.: 108 mg/dL (25 Jan 2022 12:14)      PHYSICAL EXAM:  General:   HEENT:   Lymph Nodes:  Neck:   Respiratory:   Cardiovascular:   Abdomen:   Extremities:   Skin:   Neurological:  Psychiatry:    HOSPITAL MEDICATIONS:  Standing Meds:  albuterol/ipratropium for Nebulization 3 milliLiter(s) Nebulizer every 4 hours  buDESOnide    Inhalation Suspension 0.5 milliGRAM(s) Inhalation two times a day  calcium carbonate    500 mG (Tums) Chewable 1 Tablet(s) Enteral Tube daily  ferrous    sulfate Liquid 220 milliGRAM(s) Enteral Tube at bedtime  lacosamide Solution 200 milliGRAM(s) Oral <User Schedule>  levETIRAcetam  Solution 600 milliGRAM(s) Enteral Tube two times a day  multivitamin/minerals 1 Tablet(s) Oral daily  senna 2 Tablet(s) Oral at bedtime  sodium chloride 0.9% for Nebulization 3 milliLiter(s) Nebulizer two times a day  valproic  acid Syrup 750 milliGRAM(s) Enteral Tube three times a day  vitamin A &amp; D Ointment 1 Application(s) Topical daily      PRN Meds:  aluminum hydroxide/magnesium hydroxide/simethicone Suspension 30 milliLiter(s) Enteral Tube every 4 hours PRN  magnesium hydroxide Suspension 30 milliLiter(s) Oral daily PRN  mineral oil/petrolatum Hydrophilic Ointment 1 Application(s) Topical four times a day PRN  ondansetron Injectable 4 milliGRAM(s) IV Push every 8 hours PRN  saline laxative (FLEET) Rectal Enema 1 Enema Rectal once PRN      LABS:                        11.5   3.82  )-----------( 88       ( 25 Jan 2022 07:39 )             36.0     Hgb Trend: 11.5<--, 11.9<--, 12.7<--, 12.1<--, 13.5<--  01-25    134<L>  |  94<L>  |  15  ----------------------------<  64<L>  5.1   |  28  |  0.44<L>    Ca    8.9      25 Jan 2022 07:39  Phos  4.4     01-25  Mg     1.9     01-25      Creatinine Trend: 0.44<--, 0.45<--, 0.41<--, 0.45<--, 0.42<--, 0.48<--            MICROBIOLOGY:       RADIOLOGY:  [ ] Reviewed and interpreted by me    PULMONARY FUNCTION TESTS:    EKG: CHIEF COMPLAINT: Productive cough, hemoptysis    HPI:  57 yo F PMH CP (non-verbal baseline, bedbound), intellectual disability, seizure disorder, constipation, prior hospitalizations for pseudomonas/MRSA pneumonia and aspiration requiring PEG tube presented from outpatient facility for cough and hemoptysis. Noted to have worsening of bronchi cough at facility w/ thick phlegm and streaks of blood. Also c/f increased congestion w/ difficulty clearing secretions. Given symptoms cailin to ED. Vitals on arrival notable for mild tachycardia and hypoxemia requiring NRB/4LNC. Admitted to medicine for further monitoring. During hospitalization no further episodes of hemoptysis. Weaned from 4LNC to 2LNC. CT Chest obtained w/ e/o basilar atelectasis w/ unchanged tree-in-bud nodules c/f chronic mucous plugging as well as stable 0.5cm stable LLL nodule. Pulmonary consulted for question of how to manage secretions as well as evaluation for likelihood of long term secretions.     PAST MEDICAL & SURGICAL HISTORY:  Cerebral palsy    Seizure disorder    Constipation    Intellectual disability    Asthma    Gastrostomy tube dependent        FAMILY HISTORY:  Family history unknown  unable to obtain d/t baseline intellectual disability        SOCIAL HISTORY:  Unable to obtain as pt not participating in conversation    Allergies    Topamax (Unknown)    Intolerances        HOME MEDICATIONS:  Home Medications:  albuterol 2.5 mg/3 mL (0.083%) inhalation solution: 3 milliliter(s) inhaled every 4 hours, As Needed (24 Jan 2022 11:10)  Aquaphor topical ointment: Apply topically to affected area 4 times a day, As Needed for dry lips (24 Jan 2022 11:10)  Bactrim: 10 milliliter(s) by gastrostomy tube once a day (24 Jan 2022 11:10)  budesonide 0.5 mg/2 mL inhalation suspension: 2 milliliter(s) inhaled 2 times a day (24 Jan 2022 11:10)  calcium carbonate 600 mg oral tablet, chewable: 1 tab(s) orally once a day (in the morning) (24 Jan 2022 11:10)  emollients, topical ointment: 1 application topically 4 times a day, As needed, Skin care (24 Jan 2022 11:10)  ferrous sulfate 220 mg/5 mL (44 mg/5 mL elemental iron) oral elixir: 5 milliliter(s) orally once a day (at bedtime) (24 Jan 2022 11:10)  Fleet Enema 19 g-7 g rectal enema: 133 milliliter(s) rectal once  on day 3 evening of no BM (24 Jan 2022 11:10)  Fosamax 70 mg oral tablet: 1 tab(s) by gastrostomy tube once a week (24 Jan 2022 11:10)  Keppra 100 mg/mL oral solution: 6 milliliter(s) via PEG 2 times a day (24 Jan 2022 11:10)  ketoconazole 2% topical shampoo: Apply topically to scalp 3 times a week for seborrheic dermatitis (24 Jan 2022 11:10)  metroNIDAZOLE 0.75% topical lotion: Apply topically to face 2 times a day (24 Jan 2022 11:10)  Milk of Magnesia: 30 milliliter(s) orally once a day, As Needed (24 Jan 2022 11:10)  Nayzilam 5 mg/inh nasal spray: 1 puff(s) nasal once for seizure lasting &lt;4mins (24 Jan 2022 11:10)  Thera M oral tablet: 1 tab(s) by gastrostomy tube once a day (in the afternoon) (24 Jan 2022 11:10)  valproic acid 250 mg/5 mL oral liquid: 15 milliliter(s) by gastrostomy tube 3 times a day (24 Jan 2022 11:10)  vitamin A and D topical ointment: Apply topically to affected area once a day to healed wound behind left knee (24 Jan 2022 11:10)      REVIEW OF SYSTEMS:  Constitutional: [ ] negative [ ] fevers [ ] chills [ ] weight loss [ ] weight gain  HEENT: [ ] negative [ ] dry eyes [ ] eye irritation [ ] postnasal drip [ ] nasal congestion  CV: [ ] negative  [ ] chest pain [ ] orthopnea [ ] palpitations [ ] murmur  Resp: [ ] negative [ ] cough [ ] shortness of breath [ ] dyspnea [ ] wheezing [ ] sputum [ ] hemoptysis  GI: [ ] negative [ ] nausea [ ] vomiting [ ] diarrhea [ ] constipation [ ] abd pain [ ] dysphagia   : [ ] negative [ ] dysuria [ ] nocturia [ ] hematuria [ ] increased urinary frequency  Musculoskeletal: [ ] negative [ ] back pain [ ] myalgias [ ] arthralgias [ ] fracture  Skin: [ ] negative [ ] rash [ ] itch  Neurological: [ ] negative [ ] headache [ ] dizziness [ ] syncope [ ] weakness [ ] numbness  Psychiatric: [ ] negative [ ] anxiety [ ] depression  Endocrine: [ ] negative [ ] diabetes [ ] thyroid problem  Hematologic/Lymphatic: [ ] negative [ ] anemia [ ] bleeding problem  Allergic/Immunologic: [ ] negative [ ] itchy eyes [ ] nasal discharge [ ] hives [ ] angioedema  [ ] All other systems negative  [x] Unable to assess ROS because ____not participating in conversation____    OBJECTIVE:  ICU Vital Signs Last 24 Hrs  T(C): 36.3 (25 Jan 2022 04:40), Max: 36.3 (24 Jan 2022 21:03)  T(F): 97.3 (25 Jan 2022 04:40), Max: 97.3 (24 Jan 2022 21:03)  HR: 99 (25 Jan 2022 04:40) (80 - 99)  BP: 131/76 (25 Jan 2022 04:40) (118/76 - 132/84)  BP(mean): --  ABP: --  ABP(mean): --  RR: 18 (25 Jan 2022 04:40) (18 - 18)  SpO2: 93% (25 Jan 2022 04:40) (93% - 98%)        01-24 @ 07:01  -  01-25 @ 07:00  --------------------------------------------------------  IN: 0 mL / OUT: 400 mL / NET: -400 mL      CAPILLARY BLOOD GLUCOSE      POCT Blood Glucose.: 108 mg/dL (25 Jan 2022 12:14)      PHYSICAL EXAM:  General: Female, NAD  HEENT: NC in place  Neck: Normal  Respiratory: Clear anteriorly, no increased WOB  Cardiovascular: RR no mrg  Abdomen: Distended, nontended  Extremities: WWP  Skin: Intact  Neurological: No gross focal deficits  Psychiatry: Unable to assess    HOSPITAL MEDICATIONS:  Standing Meds:  albuterol/ipratropium for Nebulization 3 milliLiter(s) Nebulizer every 4 hours  buDESOnide    Inhalation Suspension 0.5 milliGRAM(s) Inhalation two times a day  calcium carbonate    500 mG (Tums) Chewable 1 Tablet(s) Enteral Tube daily  ferrous    sulfate Liquid 220 milliGRAM(s) Enteral Tube at bedtime  lacosamide Solution 200 milliGRAM(s) Oral <User Schedule>  levETIRAcetam  Solution 600 milliGRAM(s) Enteral Tube two times a day  multivitamin/minerals 1 Tablet(s) Oral daily  senna 2 Tablet(s) Oral at bedtime  sodium chloride 0.9% for Nebulization 3 milliLiter(s) Nebulizer two times a day  valproic  acid Syrup 750 milliGRAM(s) Enteral Tube three times a day  vitamin A &amp; D Ointment 1 Application(s) Topical daily      PRN Meds:  aluminum hydroxide/magnesium hydroxide/simethicone Suspension 30 milliLiter(s) Enteral Tube every 4 hours PRN  magnesium hydroxide Suspension 30 milliLiter(s) Oral daily PRN  mineral oil/petrolatum Hydrophilic Ointment 1 Application(s) Topical four times a day PRN  ondansetron Injectable 4 milliGRAM(s) IV Push every 8 hours PRN  saline laxative (FLEET) Rectal Enema 1 Enema Rectal once PRN      LABS:                        11.5   3.82  )-----------( 88       ( 25 Jan 2022 07:39 )             36.0     Hgb Trend: 11.5<--, 11.9<--, 12.7<--, 12.1<--, 13.5<--  01-25    134<L>  |  94<L>  |  15  ----------------------------<  64<L>  5.1   |  28  |  0.44<L>    Ca    8.9      25 Jan 2022 07:39  Phos  4.4     01-25  Mg     1.9     01-25      Creatinine Trend: 0.44<--, 0.45<--, 0.41<--, 0.45<--, 0.42<--, 0.48<--            MICROBIOLOGY:       RADIOLOGY:  [ ] Reviewed and interpreted by me    PULMONARY FUNCTION TESTS:    EKG:

## 2022-01-25 NOTE — CONSULT NOTE ADULT - ASSESSMENT
57 yo F PMH CP (non-verbal baseline, bedbound), intellectual disability, seizure disorder, constipation, prior hospitalizations for pseudomonas/MRSA pneumonia and aspiration requiring PEG tube presented from outpatient facility for cough and hemoptysis. Pulmonary consulted for evaluation of secretions    - no further episodes of hemoptysis noted; continue to monitor  - CT reviewed; likely component of chronic aspiration of secretions  - recommend chest PT, hypertonic preceded by duonebs; can trial chest vest; pt likely cannot participate in handheld airway clearance device  - would send sputum culture  - continue with prn suction while inpatient; d/w team - planning for possible LTACH pending further discussion/evaluation  - would wean O2 as tolerated    Please call pulmonary if further questions    Rahat Chan MD  PGY-4  PCCM Fellow  Pager [phone] 57 yo F PMH CP (non-verbal baseline, bedbound), intellectual disability, seizure disorder, constipation, prior hospitalizations for pseudomonas/MRSA pneumonia and aspiration requiring PEG tube presented from outpatient facility for cough and hemoptysis. Pulmonary consulted for evaluation of secretions    - no further episodes of hemoptysis noted; continue to monitor  - CT reviewed; likely component of chronic aspiration of secretions  - recommend chest PT, hypertonic preceded by duonebs; can trial chest vest; pt likely cannot participate in handheld airway clearance device  - would send sputum culture  - continue with prn suction while inpatient; d/w team - planning for possible LTACH pending further discussion/evaluation  - would wean O2 as tolerated    Please call pulmonary if further questions    Rahat Chan MD  PGY-5  PCCM Fellow  Pager [phone]

## 2022-01-26 LAB
GLUCOSE BLDC GLUCOMTR-MCNC: 105 MG/DL — HIGH (ref 70–99)
GLUCOSE BLDC GLUCOMTR-MCNC: 111 MG/DL — HIGH (ref 70–99)
GLUCOSE BLDC GLUCOMTR-MCNC: 120 MG/DL — HIGH (ref 70–99)
GLUCOSE BLDC GLUCOMTR-MCNC: 95 MG/DL — SIGNIFICANT CHANGE UP (ref 70–99)

## 2022-01-26 PROCEDURE — 99233 SBSQ HOSP IP/OBS HIGH 50: CPT

## 2022-01-26 RX ADMIN — LEVETIRACETAM 600 MILLIGRAM(S): 250 TABLET, FILM COATED ORAL at 17:22

## 2022-01-26 RX ADMIN — Medication 0.5 MILLIGRAM(S): at 17:22

## 2022-01-26 RX ADMIN — LEVETIRACETAM 600 MILLIGRAM(S): 250 TABLET, FILM COATED ORAL at 06:22

## 2022-01-26 RX ADMIN — LACOSAMIDE 200 MILLIGRAM(S): 50 TABLET ORAL at 21:37

## 2022-01-26 RX ADMIN — Medication 1 TABLET(S): at 12:03

## 2022-01-26 RX ADMIN — LACOSAMIDE 200 MILLIGRAM(S): 50 TABLET ORAL at 06:21

## 2022-01-26 RX ADMIN — Medication 3 MILLILITER(S): at 21:39

## 2022-01-26 RX ADMIN — Medication 750 MILLIGRAM(S): at 06:22

## 2022-01-26 RX ADMIN — Medication 3 MILLILITER(S): at 17:22

## 2022-01-26 RX ADMIN — SODIUM CHLORIDE 3 MILLILITER(S): 9 INJECTION INTRAMUSCULAR; INTRAVENOUS; SUBCUTANEOUS at 17:22

## 2022-01-26 RX ADMIN — Medication 3 MILLILITER(S): at 01:41

## 2022-01-26 RX ADMIN — Medication 3 MILLILITER(S): at 13:11

## 2022-01-26 RX ADMIN — SODIUM CHLORIDE 3 MILLILITER(S): 9 INJECTION INTRAMUSCULAR; INTRAVENOUS; SUBCUTANEOUS at 06:21

## 2022-01-26 RX ADMIN — Medication 3 MILLILITER(S): at 09:46

## 2022-01-26 RX ADMIN — Medication 1 APPLICATION(S): at 12:04

## 2022-01-26 RX ADMIN — Medication 220 MILLIGRAM(S): at 21:38

## 2022-01-26 RX ADMIN — Medication 750 MILLIGRAM(S): at 21:38

## 2022-01-26 RX ADMIN — Medication 0.5 MILLIGRAM(S): at 06:21

## 2022-01-26 RX ADMIN — Medication 1 TABLET(S): at 06:23

## 2022-01-26 RX ADMIN — LACOSAMIDE 200 MILLIGRAM(S): 50 TABLET ORAL at 14:44

## 2022-01-26 RX ADMIN — Medication 3 MILLILITER(S): at 06:21

## 2022-01-26 RX ADMIN — Medication 750 MILLIGRAM(S): at 13:11

## 2022-01-26 RX ADMIN — SENNA PLUS 2 TABLET(S): 8.6 TABLET ORAL at 21:37

## 2022-01-26 NOTE — PROGRESS NOTE ADULT - SUBJECTIVE AND OBJECTIVE BOX
Patient is a 56y old  Female who presents with a chief complaint of Worsening productive cough, hemoptysis (25 Jan 2022 13:53)      SUBJECTIVE / OVERNIGHT EVENTS: appears comfortable     MEDICATIONS  (STANDING):  albuterol/ipratropium for Nebulization 3 milliLiter(s) Nebulizer every 4 hours  buDESOnide    Inhalation Suspension 0.5 milliGRAM(s) Inhalation two times a day  calcium carbonate    500 mG (Tums) Chewable 1 Tablet(s) Enteral Tube daily  ferrous    sulfate Liquid 220 milliGRAM(s) Enteral Tube at bedtime  lacosamide Solution 200 milliGRAM(s) Oral <User Schedule>  levETIRAcetam  Solution 600 milliGRAM(s) Enteral Tube two times a day  multivitamin/minerals 1 Tablet(s) Oral daily  senna 2 Tablet(s) Oral at bedtime  sodium chloride 0.9% for Nebulization 3 milliLiter(s) Nebulizer two times a day  valproic  acid Syrup 750 milliGRAM(s) Enteral Tube three times a day  vitamin A &amp; D Ointment 1 Application(s) Topical daily    MEDICATIONS  (PRN):  aluminum hydroxide/magnesium hydroxide/simethicone Suspension 30 milliLiter(s) Enteral Tube every 4 hours PRN Dyspepsia  magnesium hydroxide Suspension 30 milliLiter(s) Oral daily PRN Constipation  mineral oil/petrolatum Hydrophilic Ointment 1 Application(s) Topical four times a day PRN Skin care  ondansetron Injectable 4 milliGRAM(s) IV Push every 8 hours PRN Nausea and/or Vomiting  saline laxative (FLEET) Rectal Enema 1 Enema Rectal once PRN constipation        CAPILLARY BLOOD GLUCOSE      POCT Blood Glucose.: 111 mg/dL (26 Jan 2022 12:11)  POCT Blood Glucose.: 95 mg/dL (26 Jan 2022 06:18)  POCT Blood Glucose.: 133 mg/dL (25 Jan 2022 23:42)  POCT Blood Glucose.: 116 mg/dL (25 Jan 2022 18:02)    I&O's Summary    25 Jan 2022 07:01  -  26 Jan 2022 07:00  --------------------------------------------------------  IN: 860 mL / OUT: 600 mL / NET: 260 mL    26 Jan 2022 07:01  -  26 Jan 2022 14:02  --------------------------------------------------------  IN: 2 mL / OUT: 0 mL / NET: 2 mL        PHYSICAL EXAM:  GENERAL: NAD, well-developed  HEAD:  Atraumatic, Normocephalic  EYES: conjunctiva and sclera clear  NECK: Supple, No JVD  CHEST/LUNG: +rhonchi  HEART: Regular rate and rhythm;S1S2  ABDOMEN: Soft, Nontender, Nondistended; Bowel sounds present, +peg no erythema   EXTREMITIES:  2+ Peripheral Pulses    LABS:                        11.5   3.82  )-----------( 88       ( 25 Jan 2022 07:39 )             36.0     01-25    134<L>  |  94<L>  |  15  ----------------------------<  64<L>  5.1   |  28  |  0.44<L>    Ca    8.9      25 Jan 2022 07:39  Phos  4.4     01-25  Mg     1.9     01-25                RADIOLOGY & ADDITIONAL TESTS:    Imaging Personally Reviewed:    Consultant(s) Notes Reviewed:      Care Discussed with Consultants/Other Providers:

## 2022-01-27 LAB
ANION GAP SERPL CALC-SCNC: 10 MMOL/L — SIGNIFICANT CHANGE UP (ref 5–17)
ANION GAP SERPL CALC-SCNC: 10 MMOL/L — SIGNIFICANT CHANGE UP (ref 5–17)
BUN SERPL-MCNC: 18 MG/DL — SIGNIFICANT CHANGE UP (ref 7–23)
BUN SERPL-MCNC: 19 MG/DL — SIGNIFICANT CHANGE UP (ref 7–23)
CALCIUM SERPL-MCNC: 9.2 MG/DL — SIGNIFICANT CHANGE UP (ref 8.4–10.5)
CALCIUM SERPL-MCNC: 9.5 MG/DL — SIGNIFICANT CHANGE UP (ref 8.4–10.5)
CHLORIDE SERPL-SCNC: 100 MMOL/L — SIGNIFICANT CHANGE UP (ref 96–108)
CHLORIDE SERPL-SCNC: 98 MMOL/L — SIGNIFICANT CHANGE UP (ref 96–108)
CO2 SERPL-SCNC: 28 MMOL/L — SIGNIFICANT CHANGE UP (ref 22–31)
CO2 SERPL-SCNC: 29 MMOL/L — SIGNIFICANT CHANGE UP (ref 22–31)
CREAT SERPL-MCNC: 0.46 MG/DL — LOW (ref 0.5–1.3)
CREAT SERPL-MCNC: 0.48 MG/DL — LOW (ref 0.5–1.3)
GLUCOSE BLDC GLUCOMTR-MCNC: 110 MG/DL — HIGH (ref 70–99)
GLUCOSE BLDC GLUCOMTR-MCNC: 70 MG/DL — SIGNIFICANT CHANGE UP (ref 70–99)
GLUCOSE BLDC GLUCOMTR-MCNC: 83 MG/DL — SIGNIFICANT CHANGE UP (ref 70–99)
GLUCOSE BLDC GLUCOMTR-MCNC: 85 MG/DL — SIGNIFICANT CHANGE UP (ref 70–99)
GLUCOSE SERPL-MCNC: 69 MG/DL — LOW (ref 70–99)
GLUCOSE SERPL-MCNC: 86 MG/DL — SIGNIFICANT CHANGE UP (ref 70–99)
MAGNESIUM SERPL-MCNC: 2 MG/DL — SIGNIFICANT CHANGE UP (ref 1.6–2.6)
PHOSPHATE SERPL-MCNC: 5.5 MG/DL — HIGH (ref 2.5–4.5)
POTASSIUM SERPL-MCNC: 5.2 MMOL/L — SIGNIFICANT CHANGE UP (ref 3.5–5.3)
POTASSIUM SERPL-MCNC: 6 MMOL/L — HIGH (ref 3.5–5.3)
POTASSIUM SERPL-SCNC: 5.2 MMOL/L — SIGNIFICANT CHANGE UP (ref 3.5–5.3)
POTASSIUM SERPL-SCNC: 6 MMOL/L — HIGH (ref 3.5–5.3)
SODIUM SERPL-SCNC: 137 MMOL/L — SIGNIFICANT CHANGE UP (ref 135–145)
SODIUM SERPL-SCNC: 138 MMOL/L — SIGNIFICANT CHANGE UP (ref 135–145)

## 2022-01-27 PROCEDURE — 99233 SBSQ HOSP IP/OBS HIGH 50: CPT

## 2022-01-27 RX ADMIN — Medication 0.5 MILLIGRAM(S): at 17:51

## 2022-01-27 RX ADMIN — SODIUM CHLORIDE 3 MILLILITER(S): 9 INJECTION INTRAMUSCULAR; INTRAVENOUS; SUBCUTANEOUS at 17:50

## 2022-01-27 RX ADMIN — LACOSAMIDE 200 MILLIGRAM(S): 50 TABLET ORAL at 17:50

## 2022-01-27 RX ADMIN — Medication 750 MILLIGRAM(S): at 22:23

## 2022-01-27 RX ADMIN — LACOSAMIDE 200 MILLIGRAM(S): 50 TABLET ORAL at 06:06

## 2022-01-27 RX ADMIN — Medication 750 MILLIGRAM(S): at 06:07

## 2022-01-27 RX ADMIN — LEVETIRACETAM 600 MILLIGRAM(S): 250 TABLET, FILM COATED ORAL at 06:06

## 2022-01-27 RX ADMIN — Medication 3 MILLILITER(S): at 06:07

## 2022-01-27 RX ADMIN — Medication 3 MILLILITER(S): at 22:24

## 2022-01-27 RX ADMIN — LACOSAMIDE 200 MILLIGRAM(S): 50 TABLET ORAL at 22:24

## 2022-01-27 RX ADMIN — SENNA PLUS 2 TABLET(S): 8.6 TABLET ORAL at 22:24

## 2022-01-27 RX ADMIN — Medication 750 MILLIGRAM(S): at 11:46

## 2022-01-27 RX ADMIN — Medication 1 TABLET(S): at 06:06

## 2022-01-27 RX ADMIN — Medication 3 MILLILITER(S): at 01:53

## 2022-01-27 RX ADMIN — Medication 3 MILLILITER(S): at 17:50

## 2022-01-27 RX ADMIN — Medication 1 TABLET(S): at 11:46

## 2022-01-27 RX ADMIN — Medication 0.5 MILLIGRAM(S): at 06:07

## 2022-01-27 RX ADMIN — LEVETIRACETAM 600 MILLIGRAM(S): 250 TABLET, FILM COATED ORAL at 17:50

## 2022-01-27 RX ADMIN — Medication 1 APPLICATION(S): at 11:46

## 2022-01-27 RX ADMIN — SODIUM CHLORIDE 3 MILLILITER(S): 9 INJECTION INTRAMUSCULAR; INTRAVENOUS; SUBCUTANEOUS at 06:07

## 2022-01-27 RX ADMIN — Medication 220 MILLIGRAM(S): at 22:23

## 2022-01-27 NOTE — PROGRESS NOTE ADULT - PROBLEM SELECTOR PLAN 2
-CT A/P 1/19 in the ED with Questionable retraction of the percutaneous gastrostomy tube into the abdominal wall versus tenting of the stomach. Developing buried bumper syndrome is not excluded.  -Repeat CT A/P with contrast ordered by ED not showing retraction   -tolerating tube feeds

## 2022-01-27 NOTE — PROGRESS NOTE ADULT - SUBJECTIVE AND OBJECTIVE BOX
Patient is a 56y old  Female who presents with a chief complaint of Worsening productive cough, hemoptysis (26 Jan 2022 14:02)      SUBJECTIVE / OVERNIGHT EVENTS:  appears comfortable     MEDICATIONS  (STANDING):  albuterol/ipratropium for Nebulization 3 milliLiter(s) Nebulizer every 4 hours  buDESOnide    Inhalation Suspension 0.5 milliGRAM(s) Inhalation two times a day  calcium carbonate    500 mG (Tums) Chewable 1 Tablet(s) Enteral Tube daily  ferrous    sulfate Liquid 220 milliGRAM(s) Enteral Tube at bedtime  lacosamide Solution 200 milliGRAM(s) Oral <User Schedule>  levETIRAcetam  Solution 600 milliGRAM(s) Enteral Tube two times a day  multivitamin/minerals 1 Tablet(s) Oral daily  senna 2 Tablet(s) Oral at bedtime  sodium chloride 0.9% for Nebulization 3 milliLiter(s) Nebulizer two times a day  valproic  acid Syrup 750 milliGRAM(s) Enteral Tube three times a day  vitamin A &amp; D Ointment 1 Application(s) Topical daily    MEDICATIONS  (PRN):  aluminum hydroxide/magnesium hydroxide/simethicone Suspension 30 milliLiter(s) Enteral Tube every 4 hours PRN Dyspepsia  magnesium hydroxide Suspension 30 milliLiter(s) Oral daily PRN Constipation  mineral oil/petrolatum Hydrophilic Ointment 1 Application(s) Topical four times a day PRN Skin care  ondansetron Injectable 4 milliGRAM(s) IV Push every 8 hours PRN Nausea and/or Vomiting  saline laxative (FLEET) Rectal Enema 1 Enema Rectal once PRN constipation        CAPILLARY BLOOD GLUCOSE      POCT Blood Glucose.: 85 mg/dL (27 Jan 2022 12:13)  POCT Blood Glucose.: 83 mg/dL (27 Jan 2022 06:19)  POCT Blood Glucose.: 120 mg/dL (26 Jan 2022 23:31)  POCT Blood Glucose.: 105 mg/dL (26 Jan 2022 17:41)    I&O's Summary    26 Jan 2022 07:01  -  27 Jan 2022 07:00  --------------------------------------------------------  IN: 864 mL / OUT: 1100 mL / NET: -236 mL    27 Jan 2022 07:01 - 27 Jan 2022 12:55  --------------------------------------------------------  IN: 0 mL / OUT: 800 mL / NET: -800 mL        PHYSICAL EXAM:  GENERAL: NAD, well-developed  HEAD:  Atraumatic, Normocephalic  EYES: conjunctiva and sclera clear  NECK: Supple, No JVD  CHEST/LUNG: +rhonchi  HEART: Regular rate and rhythm;S1S2  ABDOMEN: Soft, Nontender, Nondistended; Bowel sounds present, +peg no erythema   EXTREMITIES:  2+ Peripheral Pulses    LABS:    01-27    138  |  100  |  18  ----------------------------<  86  6.0<H>   |  28  |  0.46<L>    Ca    9.2      27 Jan 2022 10:21  Phos  5.5     01-27  Mg     2.0     01-27                RADIOLOGY & ADDITIONAL TESTS:    Imaging Personally Reviewed:    Consultant(s) Notes Reviewed:      Care Discussed with Consultants/Other Providers:

## 2022-01-28 LAB
GLUCOSE BLDC GLUCOMTR-MCNC: 106 MG/DL — HIGH (ref 70–99)
GLUCOSE BLDC GLUCOMTR-MCNC: 81 MG/DL — SIGNIFICANT CHANGE UP (ref 70–99)
GLUCOSE BLDC GLUCOMTR-MCNC: 88 MG/DL — SIGNIFICANT CHANGE UP (ref 70–99)

## 2022-01-28 PROCEDURE — 99239 HOSP IP/OBS DSCHRG MGMT >30: CPT

## 2022-01-28 RX ORDER — SODIUM CHLORIDE 9 MG/ML
1000 INJECTION, SOLUTION INTRAVENOUS
Refills: 0 | Status: DISCONTINUED | OUTPATIENT
Start: 2022-01-28 | End: 2022-02-01

## 2022-01-28 RX ADMIN — Medication 3 MILLILITER(S): at 02:46

## 2022-01-28 RX ADMIN — Medication 3 MILLILITER(S): at 18:18

## 2022-01-28 RX ADMIN — Medication 3 MILLILITER(S): at 12:01

## 2022-01-28 RX ADMIN — Medication 3 MILLILITER(S): at 21:20

## 2022-01-28 RX ADMIN — Medication 750 MILLIGRAM(S): at 21:19

## 2022-01-28 RX ADMIN — SODIUM CHLORIDE 50 MILLILITER(S): 9 INJECTION, SOLUTION INTRAVENOUS at 23:00

## 2022-01-28 RX ADMIN — LEVETIRACETAM 600 MILLIGRAM(S): 250 TABLET, FILM COATED ORAL at 18:17

## 2022-01-28 RX ADMIN — LACOSAMIDE 200 MILLIGRAM(S): 50 TABLET ORAL at 18:18

## 2022-01-28 RX ADMIN — SENNA PLUS 2 TABLET(S): 8.6 TABLET ORAL at 21:19

## 2022-01-28 RX ADMIN — LACOSAMIDE 200 MILLIGRAM(S): 50 TABLET ORAL at 06:20

## 2022-01-28 RX ADMIN — Medication 220 MILLIGRAM(S): at 21:18

## 2022-01-28 RX ADMIN — Medication 750 MILLIGRAM(S): at 05:57

## 2022-01-28 RX ADMIN — Medication 3 MILLILITER(S): at 05:56

## 2022-01-28 RX ADMIN — SODIUM CHLORIDE 3 MILLILITER(S): 9 INJECTION INTRAMUSCULAR; INTRAVENOUS; SUBCUTANEOUS at 05:37

## 2022-01-28 RX ADMIN — Medication 1 TABLET(S): at 12:01

## 2022-01-28 RX ADMIN — Medication 3 MILLILITER(S): at 09:45

## 2022-01-28 RX ADMIN — Medication 0.5 MILLIGRAM(S): at 18:18

## 2022-01-28 RX ADMIN — Medication 750 MILLIGRAM(S): at 12:01

## 2022-01-28 RX ADMIN — SODIUM CHLORIDE 3 MILLILITER(S): 9 INJECTION INTRAMUSCULAR; INTRAVENOUS; SUBCUTANEOUS at 18:17

## 2022-01-28 RX ADMIN — Medication 0.5 MILLIGRAM(S): at 05:56

## 2022-01-28 RX ADMIN — LACOSAMIDE 200 MILLIGRAM(S): 50 TABLET ORAL at 21:19

## 2022-01-28 RX ADMIN — Medication 1 TABLET(S): at 06:17

## 2022-01-28 RX ADMIN — LEVETIRACETAM 600 MILLIGRAM(S): 250 TABLET, FILM COATED ORAL at 05:57

## 2022-01-28 NOTE — CHART NOTE - NSCHARTNOTEFT_GEN_A_CORE
Nutrition Follow Up Note  Patient seen for follow up and group home/medical staff meeting via Teams.    Pt 57 y/o F with PMH as per chart: cerebral palsy (non-verbal -moans, bedbound at baseline), profound intellectual disability, seizure d/o, constipation, asthma, multiple admissions for aspiration PNA (pseudomonas& MRSA in sputum) requiring intubation S/P PEG, admitted with worsening cough, hemoptysis.     Source: [] Patient       [x] EMR        [x] RN        [] Family at bedside       [] Other:    -If unable to interview patient: [] Trach/Vent/BiPAP  [x] Disoriented/confused/inappropriate to interview as per chart     Pt non-verbal as per chart. RN reports pt tolerating Jevity 1.2 at goal of 55 ml/hr. Denies pt with nausea, vomiting, diarrhea, or constipation, with loose BM, last yesterday (01/27).     Diet Order:   Diet, NPO with Tube Feed:   Tube Feeding Modality: Gastrostomy  Jevity 1.2 Shady (JEVITY1.2RTH)  Total Volume for 24 Hours (mL): 1320  Continuous  Starting Tube Feed Rate {mL per Hour}: 10  Increase Tube Feed Rate by (mL): 10     Every 6 hours  Until Goal Tube Feed Rate (mL per Hour): 55  Tube Feed Duration (in Hours): 24  Tube Feed Start Time: 16:20 (01-23-22)    Weights: (01/19) 68 kg - no additional weights to assess.     Nutritionally Pertinent MEDICATIONS  (STANDING):  calcium carbonate    500 mG (Tums) Chewable  ferrous    sulfate Liquid  multivitamin/minerals  senna    Pertinent Labs: (01-27 @ 18:17): Na 137, BUN 19, Cr 0.48<L>, BG 69<L>, K+ 5.2, Phos 5.5 <H>, Mg --, Alk Phos --, ALT/SGPT --, AST/SGOT --    Finger Sticks:  POCT Blood Glucose.: 88 mg/dL (01-28 @ 06:26)  POCT Blood Glucose.: 110 mg/dL (01-27 @ 23:51)  POCT Blood Glucose.: 70 mg/dL (01-27 @ 17:45)    Potassium and magnesium mostly WNL. Noted with hyperphosphatemia, taking Calcium Carbonate, will continue to monitor trend and adjust EN regimen as needed. Discussed with group home and medical staff during Teams meeting. Also, described current formula and amount of kcal and protein that pt is receiving based on estimated nutritional needs and dosing weight. All questions answered at this time.      Skin per nursing documentation: pressure ulcer in gogo. heel stage 1.   Edema as per flow sheets: +2 gogo. leg.     Estimated Needs:   [x] no change since previous assessment, based on dosing weight 68 kg:    [] recalculated:     1360 - 1700 kcal/day (20-25 kcal/kg)   68 - 82 g protein/day (1.0 - 1.2 g/kg)   Defer fluids to team     Previous Nutrition Diagnosis: Inadequate protein-energy intake    Nutrition Diagnosis is: [x] resolved - addressed with tube feedings, pt tolerating at goal.     New Nutrition Diagnosis: [x] Not applicable    Nutrition Care Plan:  [x] Achieved     Nutrition Interventions:     Education Provided:       [] Yes  [x] No    Recommendations:      1. Continue Jevity 1.2 as tolerated at 55 ml/hr x 24 hours to provide 1320 ml, 1584 kcal, 73 g protein, and 1065 ml (provides 23 kcal/kg and 1.1 g protein/kg based on dosing weight 68 kg); defer fluids to team. Will continue to monitor as able and adjust as needed (check electrolytes as able and adjust EN as needed).   2. Consider change Multivitamin/minerals to Nephro-Elida in setting of high phosphorus; optimizes nutrient intake to further aid with pressure ulcer healing. Spoke to PA.   3. Obtain current weight as able to identify changes if any.      Monitoring and Evaluation:   Continue to monitor EN provision/tolerance, weights, labs, skin integrity    RD remains available upon request and will follow up per protocol  Danielle Evangelista MS RDN CDN #[phone]

## 2022-01-28 NOTE — PROVIDER CONTACT NOTE (OTHER) - ASSESSMENT
Patient A&Ox0 at baseline. Unable to continue tube feeds due to dislodgement.
Patient A&Ox0 at baseline. Unable to start tube feeds due to inability to obtain kangaroo pump. Attempted to call materials and ICUs for pump, however still unable to obtain.
Patient A&Ox0, no s/s of distress. As per day nurse, was unable to give 1500 dose of lacosamide as medication was unavailable. Patient due for 2200 dose.

## 2022-01-28 NOTE — PROVIDER CONTACT NOTE (OTHER) - ACTION/TREATMENT ORDERED:
Peg site covered, pending IVF order, FS Q6, Will continue to monitor. Peg track protected with mancilla cath insertion by PA, site covered with dry sterile dressing, pending IVF order, FS Q6, Will continue to monitor.

## 2022-01-28 NOTE — PROVIDER CONTACT NOTE (OTHER) - BACKGROUND
Patient is a 55 y/o female admitted for hemoptysis with PMH asthma, intellectual disability, seizure disorder
Patient is a 57 y/o female admitted for hemoptysis with PMH asthma, intellectual disability, seizure disorder
Patient is a 57 y/o female admitted for hemoptysis with PMH asthma, intellectual disability, seizure disorder

## 2022-01-28 NOTE — CHART NOTE - NSCHARTNOTEFT_GEN_A_CORE
Notified by RN of patient with dislodged PEG tube. Site to be covered with dressing at this time and GI consult needed in AM to replace tube. Patient started on D5W 50cc/hr as blood sugars have been trending . Continue finger sticks q6 hours. Continue to monitor patient overnight. Will notify day team in AM.    Katelyn Arroyo PA   Medicine  [phone] Notified by RN of patient with dislodged PEG tube. Patient seen and examined bedside. Cazares was placed to preserve track. Site to be covered with dressing at this time and GI consult needed in AM to replace tube. Patient started on D5W 50cc/hr as blood sugars have been trending . Continue finger sticks q6 hours. Continue to monitor patient overnight. Will notify day team in AM.    Katelyn Arroyo [facility]  [phone] Notified by RN of patient with dislodged PEG tube. Patient seen and examined bedside. Cazares was placed to preserve track. Site to be covered with dressing at this time. House GI consult emailed; to f/u in AM to replace tube. Patient started on D5W 50cc/hr as blood sugars have been trending . Continue finger sticks q6 hours. Continue to monitor patient overnight. Will notify day team in AM.    Katelyn Arroyo [facility]  [phone]

## 2022-01-28 NOTE — PROVIDER CONTACT NOTE (OTHER) - RECOMMENDATIONS
Notify PA.
Notify PA.
Cover peg site, administer IVF for hydration, continue FS Q6, peg reinsertion possibly tomorrow.

## 2022-01-28 NOTE — PROGRESS NOTE ADULT - SUBJECTIVE AND OBJECTIVE BOX
Patient is a 56y old  Female who presents with a chief complaint of Worsening productive cough, hemoptysis (27 Jan 2022 12:54)      SUBJECTIVE / OVERNIGHT EVENTS: appears comfortable    MEDICATIONS  (STANDING):  albuterol/ipratropium for Nebulization 3 milliLiter(s) Nebulizer every 4 hours  buDESOnide    Inhalation Suspension 0.5 milliGRAM(s) Inhalation two times a day  calcium carbonate    500 mG (Tums) Chewable 1 Tablet(s) Enteral Tube daily  ferrous    sulfate Liquid 220 milliGRAM(s) Enteral Tube at bedtime  lacosamide Solution 200 milliGRAM(s) Oral <User Schedule>  levETIRAcetam  Solution 600 milliGRAM(s) Enteral Tube two times a day  multivitamin/minerals 1 Tablet(s) Oral daily  senna 2 Tablet(s) Oral at bedtime  sodium chloride 0.9% for Nebulization 3 milliLiter(s) Nebulizer two times a day  valproic  acid Syrup 750 milliGRAM(s) Enteral Tube three times a day  vitamin A &amp; D Ointment 1 Application(s) Topical daily    MEDICATIONS  (PRN):  aluminum hydroxide/magnesium hydroxide/simethicone Suspension 30 milliLiter(s) Enteral Tube every 4 hours PRN Dyspepsia  magnesium hydroxide Suspension 30 milliLiter(s) Oral daily PRN Constipation  mineral oil/petrolatum Hydrophilic Ointment 1 Application(s) Topical four times a day PRN Skin care  ondansetron Injectable 4 milliGRAM(s) IV Push every 8 hours PRN Nausea and/or Vomiting  saline laxative (FLEET) Rectal Enema 1 Enema Rectal once PRN constipation        CAPILLARY BLOOD GLUCOSE      POCT Blood Glucose.: 88 mg/dL (28 Jan 2022 06:26)  POCT Blood Glucose.: 110 mg/dL (27 Jan 2022 23:51)  POCT Blood Glucose.: 70 mg/dL (27 Jan 2022 17:45)    I&O's Summary    27 Jan 2022 07:01  -  28 Jan 2022 07:00  --------------------------------------------------------  IN: 2 mL / OUT: 800 mL / NET: -798 mL        PHYSICAL EXAM:  GENERAL: NAD, well-developed  HEAD:  Atraumatic, Normocephalic  EYES: conjunctiva and sclera clear  NECK: Supple, No JVD  CHEST/LUNG: CTA bl  HEART: Regular rate and rhythm;S1S2  ABDOMEN: Soft, Nontender, Nondistended; Bowel sounds present, +peg no erythema   EXTREMITIES:  2+ Peripheral Pulses      LABS:    01-27    137  |  98  |  19  ----------------------------<  69<L>  5.2   |  29  |  0.48<L>    Ca    9.5      27 Jan 2022 18:17  Phos  5.5     01-27  Mg     2.0     01-27                RADIOLOGY & ADDITIONAL TESTS:    Imaging Personally Reviewed:    Consultant(s) Notes Reviewed:      Care Discussed with Consultants/Other Providers:

## 2022-01-29 LAB
ANION GAP SERPL CALC-SCNC: 12 MMOL/L — SIGNIFICANT CHANGE UP (ref 5–17)
BUN SERPL-MCNC: 19 MG/DL — SIGNIFICANT CHANGE UP (ref 7–23)
CALCIUM SERPL-MCNC: 9.4 MG/DL — SIGNIFICANT CHANGE UP (ref 8.4–10.5)
CHLORIDE SERPL-SCNC: 93 MMOL/L — LOW (ref 96–108)
CO2 SERPL-SCNC: 28 MMOL/L — SIGNIFICANT CHANGE UP (ref 22–31)
CREAT SERPL-MCNC: 0.47 MG/DL — LOW (ref 0.5–1.3)
GLUCOSE BLDC GLUCOMTR-MCNC: 70 MG/DL — SIGNIFICANT CHANGE UP (ref 70–99)
GLUCOSE BLDC GLUCOMTR-MCNC: 74 MG/DL — SIGNIFICANT CHANGE UP (ref 70–99)
GLUCOSE BLDC GLUCOMTR-MCNC: 75 MG/DL — SIGNIFICANT CHANGE UP (ref 70–99)
GLUCOSE BLDC GLUCOMTR-MCNC: 81 MG/DL — SIGNIFICANT CHANGE UP (ref 70–99)
GLUCOSE BLDC GLUCOMTR-MCNC: 83 MG/DL — SIGNIFICANT CHANGE UP (ref 70–99)
GLUCOSE BLDC GLUCOMTR-MCNC: 90 MG/DL — SIGNIFICANT CHANGE UP (ref 70–99)
GLUCOSE SERPL-MCNC: 79 MG/DL — SIGNIFICANT CHANGE UP (ref 70–99)
HCT VFR BLD CALC: 39 % — SIGNIFICANT CHANGE UP (ref 34.5–45)
HGB BLD-MCNC: 12.7 G/DL — SIGNIFICANT CHANGE UP (ref 11.5–15.5)
MCHC RBC-ENTMCNC: 32.6 GM/DL — SIGNIFICANT CHANGE UP (ref 32–36)
MCHC RBC-ENTMCNC: 34.6 PG — HIGH (ref 27–34)
MCV RBC AUTO: 106.3 FL — HIGH (ref 80–100)
NRBC # BLD: 0 /100 WBCS — SIGNIFICANT CHANGE UP (ref 0–0)
PLATELET # BLD AUTO: 97 K/UL — LOW (ref 150–400)
POTASSIUM SERPL-MCNC: 4.1 MMOL/L — SIGNIFICANT CHANGE UP (ref 3.5–5.3)
POTASSIUM SERPL-SCNC: 4.1 MMOL/L — SIGNIFICANT CHANGE UP (ref 3.5–5.3)
RBC # BLD: 3.67 M/UL — LOW (ref 3.8–5.2)
RBC # FLD: 14.7 % — HIGH (ref 10.3–14.5)
SODIUM SERPL-SCNC: 133 MMOL/L — LOW (ref 135–145)
WBC # BLD: 6.54 K/UL — SIGNIFICANT CHANGE UP (ref 3.8–10.5)
WBC # FLD AUTO: 6.54 K/UL — SIGNIFICANT CHANGE UP (ref 3.8–10.5)

## 2022-01-29 PROCEDURE — 99223 1ST HOSP IP/OBS HIGH 75: CPT | Mod: 25

## 2022-01-29 PROCEDURE — 49450 REPLACE G/C TUBE PERC: CPT

## 2022-01-29 PROCEDURE — 99232 SBSQ HOSP IP/OBS MODERATE 35: CPT

## 2022-01-29 PROCEDURE — 49465 FLUORO EXAM OF G/COLON TUBE: CPT

## 2022-01-29 RX ORDER — LACOSAMIDE 50 MG/1
200 TABLET ORAL
Refills: 0 | Status: DISCONTINUED | OUTPATIENT
Start: 2022-01-29 | End: 2022-01-29

## 2022-01-29 RX ORDER — LACOSAMIDE 50 MG/1
200 TABLET ORAL
Refills: 0 | Status: DISCONTINUED | OUTPATIENT
Start: 2022-01-30 | End: 2022-02-01

## 2022-01-29 RX ORDER — VALPROIC ACID (AS SODIUM SALT) 250 MG/5ML
750 SOLUTION, ORAL ORAL THREE TIMES A DAY
Refills: 0 | Status: DISCONTINUED | OUTPATIENT
Start: 2022-01-30 | End: 2022-02-01

## 2022-01-29 RX ORDER — VALPROIC ACID (AS SODIUM SALT) 250 MG/5ML
750 SOLUTION, ORAL ORAL THREE TIMES A DAY
Refills: 0 | Status: DISCONTINUED | OUTPATIENT
Start: 2022-01-29 | End: 2022-01-29

## 2022-01-29 RX ORDER — LEVETIRACETAM 250 MG/1
600 TABLET, FILM COATED ORAL EVERY 12 HOURS
Refills: 0 | Status: DISCONTINUED | OUTPATIENT
Start: 2022-01-29 | End: 2022-01-29

## 2022-01-29 RX ORDER — LEVETIRACETAM 250 MG/1
500 TABLET, FILM COATED ORAL EVERY 12 HOURS
Refills: 0 | Status: DISCONTINUED | OUTPATIENT
Start: 2022-01-29 | End: 2022-01-29

## 2022-01-29 RX ORDER — LEVETIRACETAM 250 MG/1
600 TABLET, FILM COATED ORAL
Refills: 0 | Status: DISCONTINUED | OUTPATIENT
Start: 2022-01-30 | End: 2022-02-01

## 2022-01-29 RX ORDER — DEXTROSE 50 % IN WATER 50 %
12.5 SYRINGE (ML) INTRAVENOUS ONCE
Refills: 0 | Status: COMPLETED | OUTPATIENT
Start: 2022-01-29 | End: 2022-01-29

## 2022-01-29 RX ADMIN — LACOSAMIDE 140 MILLIGRAM(S): 50 TABLET ORAL at 16:24

## 2022-01-29 RX ADMIN — LEVETIRACETAM 400 MILLIGRAM(S): 250 TABLET, FILM COATED ORAL at 05:32

## 2022-01-29 RX ADMIN — LEVETIRACETAM 400 MILLIGRAM(S): 250 TABLET, FILM COATED ORAL at 17:34

## 2022-01-29 RX ADMIN — SODIUM CHLORIDE 3 MILLILITER(S): 9 INJECTION INTRAMUSCULAR; INTRAVENOUS; SUBCUTANEOUS at 05:33

## 2022-01-29 RX ADMIN — Medication 25 MILLIGRAM(S): at 21:47

## 2022-01-29 RX ADMIN — Medication 3 MILLILITER(S): at 21:48

## 2022-01-29 RX ADMIN — Medication 0.5 MILLIGRAM(S): at 17:34

## 2022-01-29 RX ADMIN — Medication 3 MILLILITER(S): at 17:35

## 2022-01-29 RX ADMIN — Medication 3 MILLILITER(S): at 05:33

## 2022-01-29 RX ADMIN — LACOSAMIDE 140 MILLIGRAM(S): 50 TABLET ORAL at 06:05

## 2022-01-29 RX ADMIN — Medication 25 MILLIGRAM(S): at 12:50

## 2022-01-29 RX ADMIN — Medication 0.5 MILLIGRAM(S): at 05:32

## 2022-01-29 RX ADMIN — Medication 3 MILLILITER(S): at 10:37

## 2022-01-29 RX ADMIN — Medication 3 MILLILITER(S): at 12:50

## 2022-01-29 RX ADMIN — Medication 25 MILLIGRAM(S): at 05:53

## 2022-01-29 RX ADMIN — LACOSAMIDE 140 MILLIGRAM(S): 50 TABLET ORAL at 21:47

## 2022-01-29 NOTE — CONSULT NOTE ADULT - ASSESSMENT
Impression:  A 18 [place] Bruno Balloon Peg was placed into track. 10 cc of sterile water were inserted into balloon. Peg was noted to be secure in place with external bumper at 4cm.    Recommend:   -Tube study to evaluate location of peg in stomach. If tube study shows appropriate position, can restart tube feeding and medications via tube.  - please do not place gauze under external bumper as this can create tissue injury    GI will sign off. Please call back with any further questions    Blair Riley, PGY4  Gastroenterology Fellow

## 2022-01-29 NOTE — CONSULT NOTE ADULT - SUBJECTIVE AND OBJECTIVE BOX
HPI:  History obtained from chart review.   56F PMH Cerebral palsy(non-verbal(moans), bedbound at baseline),profound intellectual disability seizure d/o, constipation multiple admissions for aspiration PNA(pseudomonas& MRSA in sputum) requiring intubation s/p PEG presenting with worsening cough and hemoptysis. GI consulted because peg fell out 1/29/22 in AM, replaced w/ mancilla.     Allergies:  Topamax (Unknown)      Home Medications:    Hospital Medications:  albuterol/ipratropium for Nebulization 3 milliLiter(s) Nebulizer every 4 hours  aluminum hydroxide/magnesium hydroxide/simethicone Suspension 30 milliLiter(s) Enteral Tube every 4 hours PRN  buDESOnide    Inhalation Suspension 0.5 milliGRAM(s) Inhalation two times a day  calcium carbonate    500 mG (Tums) Chewable 1 Tablet(s) Enteral Tube daily  dextrose 5%. 1000 milliLiter(s) IV Continuous <Continuous>  ferrous    sulfate Liquid 220 milliGRAM(s) Enteral Tube at bedtime  lacosamide IVPB 200 milliGRAM(s) IV Intermittent <User Schedule>  levETIRAcetam  IVPB 500 milliGRAM(s) IV Intermittent every 12 hours  magnesium hydroxide Suspension 30 milliLiter(s) Oral daily PRN  mineral oil/petrolatum Hydrophilic Ointment 1 Application(s) Topical four times a day PRN  Nephro-rah 1 Tablet(s) Oral daily  ondansetron Injectable 4 milliGRAM(s) IV Push every 8 hours PRN  saline laxative (FLEET) Rectal Enema 1 Enema Rectal once PRN  senna 2 Tablet(s) Oral at bedtime  sodium chloride 0.9% for Nebulization 3 milliLiter(s) Nebulizer two times a day  valproate sodium IVPB 750 milliGRAM(s) IV Intermittent three times a day  vitamin A &amp; D Ointment 1 Application(s) Topical daily      PMHX/PSHX:  Cerebral palsy    Seizure disorder    Constipation    Intellectual disability    Asthma    No significant past surgical history    Gastrostomy tube dependent        Family history:  No pertinent family history in first degree relatives    No pertinent family history in first degree relatives    No pertinent family history in first degree relatives    Family history unknown      ROS: unable to obtain    PHYSICAL EXAM:     GENERAL:  lying in bed, nonverbal, intermittently moaning  HEENT:  NCAT, no scleral icterus   CHEST:  no respiratory distress  HEART:  Regular rate and rhythm  ABDOMEN:  PEG site c/d/i, mancilla in place; mancilla removed and 18F PEG placed  EXTREMITIES: No edema  SKIN:  No rash/erythema/ecchymoses/petechiae/wounds/abscess/warm/dry  NEURO:  confused    Vital Signs:  Vital Signs Last 24 Hrs  T(C): 36.6 (29 Jan 2022 12:16), Max: 36.7 (28 Jan 2022 21:09)  T(F): 97.9 (29 Jan 2022 12:16), Max: 98.1 (28 Jan 2022 21:09)  HR: 89 (29 Jan 2022 12:16) (89 - 94)  BP: 110/62 (29 Jan 2022 12:16) (110/62 - 142/81)  BP(mean): --  RR: 18 (29 Jan 2022 12:16) (17 - 18)  SpO2: 96% (29 Jan 2022 12:16) (95% - 96%)  Daily     Daily     LABS:                        12.7   6.54  )-----------( 97       ( 29 Jan 2022 07:23 )             39.0     Mean Cell Volume: 106.3 fl (01-29-22 @ 07:23)    01-29    133<L>  |  93<L>  |  19  ----------------------------<  79  4.1   |  28  |  0.47<L>    Ca    9.4      29 Jan 2022 07:16                                    12.7   6.54  )-----------( 97       ( 29 Jan 2022 07:23 )             39.0       Imaging:

## 2022-01-29 NOTE — PROGRESS NOTE ADULT - PROBLEM SELECTOR PLAN 2
-CT A/P 1/19 in the ED with Questionable retraction of the percutaneous gastrostomy tube into the abdominal wall versus tenting of the stomach. Developing buried bumper syndrome is not excluded.  -Repeat CT A/P with contrast ordered by ED not showing retraction   -tolerating tube feeds - Patient with dislodged PEG last night.  - Now has a spacer Cazares catheter.   - Awaiting GI evaluation this AM for replacement.

## 2022-01-29 NOTE — CONSULT NOTE ADULT - ATTENDING COMMENTS
57 yo obese F pmh CP with limited cognition, s/p PEG due to recurrent aspiration PNA in past who presents for episode of PNA.  Found to have dislodged PEG tube.  Cazares placed successfully to save track.  Benign Exam.  Plan for PEG exchange at bedside.  After exchange will need KUB with PEG tube check
56F PMH CP with developmental delay, bedbound, nonverbal, seizure disorder, constipation, h/o MRSA/Pseudomonas pneumonia (2019), and oropharyngeal dysphagia s/p PEG presents with cough and hemoptysis, now with resolved hemoptysis but with thick secretions requiring frequent suctioning and supplemental oxygen.     CTPA (1/20/22) with no PE, patent airways, bandlike atelectasis at the bases, unchanged tree-in-bud nodularity with evidence of chronic inspissated mucus/small airways disease. Stable LLL 5 mm lung nodule    - Wean down FiO2, currently on 2 liters NC with SpO2 97%. Taper for goal SpO2>90%  - Aggressive chest percussion therapy  - Albuterol and ipratropium nebs q4-6h  - 3% hypertonic saline 4mL 2-3 times/day (give after albuterol)  - Consider chest vest  - Suctioning as necessary  - Please obtain sputum culture  - Will sign off, please call back with questions

## 2022-01-29 NOTE — PROGRESS NOTE ADULT - SUBJECTIVE AND OBJECTIVE BOX
Patient is a 56y old  Female who presents with a chief complaint of Worsening productive cough, hemoptysis (28 Jan 2022 12:15)      SUBJECTIVE / OVERNIGHT EVENTS:  No distress.  No acute events overnight.    MEDICATIONS  (STANDING):  albuterol/ipratropium for Nebulization 3 milliLiter(s) Nebulizer every 4 hours  buDESOnide    Inhalation Suspension 0.5 milliGRAM(s) Inhalation two times a day  calcium carbonate    500 mG (Tums) Chewable 1 Tablet(s) Enteral Tube daily  dextrose 5%. 1000 milliLiter(s) (50 mL/Hr) IV Continuous <Continuous>  ferrous    sulfate Liquid 220 milliGRAM(s) Enteral Tube at bedtime  lacosamide IVPB 200 milliGRAM(s) IV Intermittent <User Schedule>  levETIRAcetam  IVPB 500 milliGRAM(s) IV Intermittent every 12 hours  Nephro-rah 1 Tablet(s) Oral daily  senna 2 Tablet(s) Oral at bedtime  sodium chloride 0.9% for Nebulization 3 milliLiter(s) Nebulizer two times a day  valproate sodium IVPB 750 milliGRAM(s) IV Intermittent three times a day  vitamin A &amp; D Ointment 1 Application(s) Topical daily    MEDICATIONS  (PRN):  aluminum hydroxide/magnesium hydroxide/simethicone Suspension 30 milliLiter(s) Enteral Tube every 4 hours PRN Dyspepsia  magnesium hydroxide Suspension 30 milliLiter(s) Oral daily PRN Constipation  mineral oil/petrolatum Hydrophilic Ointment 1 Application(s) Topical four times a day PRN Skin care  ondansetron Injectable 4 milliGRAM(s) IV Push every 8 hours PRN Nausea and/or Vomiting  saline laxative (FLEET) Rectal Enema 1 Enema Rectal once PRN constipation      CAPILLARY BLOOD GLUCOSE      POCT Blood Glucose.: 74 mg/dL (29 Jan 2022 06:02)  POCT Blood Glucose.: 83 mg/dL (29 Jan 2022 01:59)  POCT Blood Glucose.: 75 mg/dL (29 Jan 2022 00:17)  POCT Blood Glucose.: 81 mg/dL (28 Jan 2022 17:07)  POCT Blood Glucose.: 106 mg/dL (28 Jan 2022 13:06)    I&O's Summary    28 Jan 2022 07:01  -  29 Jan 2022 07:00  --------------------------------------------------------  IN: 0 mL / OUT: 900 mL / NET: -900 mL        PHYSICAL EXAM:  Vital Signs Last 24 Hrs  T(C): 36.3 (29 Jan 2022 04:06), Max: 36.7 (28 Jan 2022 21:09)  T(F): 97.3 (29 Jan 2022 04:06), Max: 98.1 (28 Jan 2022 21:09)  HR: 90 (29 Jan 2022 04:06) (90 - 94)  BP: 142/81 (29 Jan 2022 04:06) (126/88 - 142/81)  BP(mean): --  RR: 18 (29 Jan 2022 04:06) (17 - 18)  SpO2: 95% (29 Jan 2022 04:06) (95% - 97%)  GENERAL: NAD, well-developed  HEAD:  Atraumatic, Normocephalic  EYES: EOMI, PERRLA, conjunctiva and sclera clear  NECK: Supple, No JVD  CHEST/LUNG: Clear to auscultation bilaterally; No wheeze  HEART: Regular rate and rhythm; No murmurs, rubs, or gallops  ABDOMEN: Soft, Nontender, Nondistended; Bowel sounds present, + PEG  EXTREMITIES:  2+ Peripheral Pulses, No clubbing, cyanosis, or edema  PSYCH: Comfortable  NEUROLOGY: non-focal  SKIN: No rashes or lesions    LABS:                        12.7   6.54  )-----------( 97       ( 29 Jan 2022 07:23 )             39.0     01-29    133<L>  |  93<L>  |  19  ----------------------------<  79  4.1   |  28  |  0.47<L>    Ca    9.4      29 Jan 2022 07:16  Phos  5.5     01-27  Mg     2.0     01-27                RADIOLOGY & ADDITIONAL TESTS:    Imaging Personally Reviewed:    Consultant(s) Notes Reviewed:      Care Discussed with Consultants/Other Providers:   Patient is a 56y old  Female who presents with a chief complaint of Worsening productive cough, hemoptysis (28 Jan 2022 12:15)      SUBJECTIVE / OVERNIGHT EVENTS:  No distress.  PEG dislodged overnight.  Now has a spacer Cazares in place.  GI to follow this AM.    MEDICATIONS  (STANDING):  albuterol/ipratropium for Nebulization 3 milliLiter(s) Nebulizer every 4 hours  buDESOnide    Inhalation Suspension 0.5 milliGRAM(s) Inhalation two times a day  calcium carbonate    500 mG (Tums) Chewable 1 Tablet(s) Enteral Tube daily  dextrose 5%. 1000 milliLiter(s) (50 mL/Hr) IV Continuous <Continuous>  ferrous    sulfate Liquid 220 milliGRAM(s) Enteral Tube at bedtime  lacosamide IVPB 200 milliGRAM(s) IV Intermittent <User Schedule>  levETIRAcetam  IVPB 500 milliGRAM(s) IV Intermittent every 12 hours  Nephro-rah 1 Tablet(s) Oral daily  senna 2 Tablet(s) Oral at bedtime  sodium chloride 0.9% for Nebulization 3 milliLiter(s) Nebulizer two times a day  valproate sodium IVPB 750 milliGRAM(s) IV Intermittent three times a day  vitamin A &amp; D Ointment 1 Application(s) Topical daily    MEDICATIONS  (PRN):  aluminum hydroxide/magnesium hydroxide/simethicone Suspension 30 milliLiter(s) Enteral Tube every 4 hours PRN Dyspepsia  magnesium hydroxide Suspension 30 milliLiter(s) Oral daily PRN Constipation  mineral oil/petrolatum Hydrophilic Ointment 1 Application(s) Topical four times a day PRN Skin care  ondansetron Injectable 4 milliGRAM(s) IV Push every 8 hours PRN Nausea and/or Vomiting  saline laxative (FLEET) Rectal Enema 1 Enema Rectal once PRN constipation      CAPILLARY BLOOD GLUCOSE      POCT Blood Glucose.: 74 mg/dL (29 Jan 2022 06:02)  POCT Blood Glucose.: 83 mg/dL (29 Jan 2022 01:59)  POCT Blood Glucose.: 75 mg/dL (29 Jan 2022 00:17)  POCT Blood Glucose.: 81 mg/dL (28 Jan 2022 17:07)  POCT Blood Glucose.: 106 mg/dL (28 Jan 2022 13:06)    I&O's Summary    28 Jan 2022 07:01  -  29 Jan 2022 07:00  --------------------------------------------------------  IN: 0 mL / OUT: 900 mL / NET: -900 mL        PHYSICAL EXAM:  Vital Signs Last 24 Hrs  T(C): 36.3 (29 Jan 2022 04:06), Max: 36.7 (28 Jan 2022 21:09)  T(F): 97.3 (29 Jan 2022 04:06), Max: 98.1 (28 Jan 2022 21:09)  HR: 90 (29 Jan 2022 04:06) (90 - 94)  BP: 142/81 (29 Jan 2022 04:06) (126/88 - 142/81)  BP(mean): --  RR: 18 (29 Jan 2022 04:06) (17 - 18)  SpO2: 95% (29 Jan 2022 04:06) (95% - 97%)  GENERAL: NAD, well-developed  HEAD:  Atraumatic, Normocephalic  EYES: EOMI, PERRLA, conjunctiva and sclera clear  NECK: Supple, No JVD  CHEST/LUNG: Clear to auscultation bilaterally; No wheeze  HEART: Regular rate and rhythm; No murmurs, rubs, or gallops  ABDOMEN: Soft, Nontender, Nondistended; Bowel sounds present, + PEG  EXTREMITIES:  2+ Peripheral Pulses, No clubbing, cyanosis, or edema  PSYCH: Comfortable  NEUROLOGY: non-focal  SKIN: No rashes or lesions    LABS:                        12.7   6.54  )-----------( 97       ( 29 Jan 2022 07:23 )             39.0     01-29    133<L>  |  93<L>  |  19  ----------------------------<  79  4.1   |  28  |  0.47<L>    Ca    9.4      29 Jan 2022 07:16  Phos  5.5     01-27  Mg     2.0     01-27                RADIOLOGY & ADDITIONAL TESTS:    Imaging Personally Reviewed:    Consultant(s) Notes Reviewed:      Care Discussed with Consultants/Other Providers:

## 2022-01-30 LAB
GLUCOSE BLDC GLUCOMTR-MCNC: 100 MG/DL — HIGH (ref 70–99)
GLUCOSE BLDC GLUCOMTR-MCNC: 78 MG/DL — SIGNIFICANT CHANGE UP (ref 70–99)
GLUCOSE BLDC GLUCOMTR-MCNC: 92 MG/DL — SIGNIFICANT CHANGE UP (ref 70–99)
GLUCOSE BLDC GLUCOMTR-MCNC: 94 MG/DL — SIGNIFICANT CHANGE UP (ref 70–99)
SARS-COV-2 RNA SPEC QL NAA+PROBE: SIGNIFICANT CHANGE UP

## 2022-01-30 PROCEDURE — 99232 SBSQ HOSP IP/OBS MODERATE 35: CPT

## 2022-01-30 RX ADMIN — LEVETIRACETAM 600 MILLIGRAM(S): 250 TABLET, FILM COATED ORAL at 17:15

## 2022-01-30 RX ADMIN — Medication 1 TABLET(S): at 05:10

## 2022-01-30 RX ADMIN — LACOSAMIDE 200 MILLIGRAM(S): 50 TABLET ORAL at 14:39

## 2022-01-30 RX ADMIN — Medication 1 TABLET(S): at 11:14

## 2022-01-30 RX ADMIN — Medication 0.5 MILLIGRAM(S): at 05:08

## 2022-01-30 RX ADMIN — Medication 3 MILLILITER(S): at 14:32

## 2022-01-30 RX ADMIN — SENNA PLUS 2 TABLET(S): 8.6 TABLET ORAL at 21:28

## 2022-01-30 RX ADMIN — Medication 1 APPLICATION(S): at 14:30

## 2022-01-30 RX ADMIN — Medication 750 MILLIGRAM(S): at 05:09

## 2022-01-30 RX ADMIN — LACOSAMIDE 200 MILLIGRAM(S): 50 TABLET ORAL at 07:02

## 2022-01-30 RX ADMIN — Medication 3 MILLILITER(S): at 05:08

## 2022-01-30 RX ADMIN — Medication 3 MILLILITER(S): at 11:13

## 2022-01-30 RX ADMIN — LEVETIRACETAM 600 MILLIGRAM(S): 250 TABLET, FILM COATED ORAL at 05:10

## 2022-01-30 RX ADMIN — SODIUM CHLORIDE 3 MILLILITER(S): 9 INJECTION INTRAMUSCULAR; INTRAVENOUS; SUBCUTANEOUS at 05:09

## 2022-01-30 RX ADMIN — Medication 750 MILLIGRAM(S): at 21:29

## 2022-01-30 RX ADMIN — Medication 220 MILLIGRAM(S): at 21:29

## 2022-01-30 RX ADMIN — SODIUM CHLORIDE 3 MILLILITER(S): 9 INJECTION INTRAMUSCULAR; INTRAVENOUS; SUBCUTANEOUS at 17:15

## 2022-01-30 RX ADMIN — Medication 3 MILLILITER(S): at 17:14

## 2022-01-30 RX ADMIN — Medication 3 MILLILITER(S): at 21:28

## 2022-01-30 RX ADMIN — Medication 750 MILLIGRAM(S): at 14:32

## 2022-01-30 RX ADMIN — LACOSAMIDE 200 MILLIGRAM(S): 50 TABLET ORAL at 22:00

## 2022-01-30 RX ADMIN — Medication 0.5 MILLIGRAM(S): at 17:15

## 2022-01-30 NOTE — PROGRESS NOTE ADULT - SUBJECTIVE AND OBJECTIVE BOX
Patient is a 56y old  Female who presents with a chief complaint of Worsening productive cough, hemoptysis (28 Jan 2022 12:15)      SUBJECTIVE / OVERNIGHT EVENTS:  No distress.  PEG dislodged yesterday, and then replaced by GI yesterday.    MEDICATIONS  (STANDING):  albuterol/ipratropium for Nebulization 3 milliLiter(s) Nebulizer every 4 hours  buDESOnide    Inhalation Suspension 0.5 milliGRAM(s) Inhalation two times a day  calcium carbonate    500 mG (Tums) Chewable 1 Tablet(s) Enteral Tube daily  dextrose 5%. 1000 milliLiter(s) (50 mL/Hr) IV Continuous <Continuous>  ferrous    sulfate Liquid 220 milliGRAM(s) Enteral Tube at bedtime  lacosamide IVPB 200 milliGRAM(s) IV Intermittent <User Schedule>  levETIRAcetam  IVPB 500 milliGRAM(s) IV Intermittent every 12 hours  Nephro-rah 1 Tablet(s) Oral daily  senna 2 Tablet(s) Oral at bedtime  sodium chloride 0.9% for Nebulization 3 milliLiter(s) Nebulizer two times a day  valproate sodium IVPB 750 milliGRAM(s) IV Intermittent three times a day  vitamin A &amp; D Ointment 1 Application(s) Topical daily    MEDICATIONS  (PRN):  aluminum hydroxide/magnesium hydroxide/simethicone Suspension 30 milliLiter(s) Enteral Tube every 4 hours PRN Dyspepsia  magnesium hydroxide Suspension 30 milliLiter(s) Oral daily PRN Constipation  mineral oil/petrolatum Hydrophilic Ointment 1 Application(s) Topical four times a day PRN Skin care  ondansetron Injectable 4 milliGRAM(s) IV Push every 8 hours PRN Nausea and/or Vomiting  saline laxative (FLEET) Rectal Enema 1 Enema Rectal once PRN constipation      CAPILLARY BLOOD GLUCOSE      POCT Blood Glucose.: 74 mg/dL (29 Jan 2022 06:02)  POCT Blood Glucose.: 83 mg/dL (29 Jan 2022 01:59)  POCT Blood Glucose.: 75 mg/dL (29 Jan 2022 00:17)  POCT Blood Glucose.: 81 mg/dL (28 Jan 2022 17:07)  POCT Blood Glucose.: 106 mg/dL (28 Jan 2022 13:06)    I&O's Summary    28 Jan 2022 07:01  -  29 Jan 2022 07:00  --------------------------------------------------------  IN: 0 mL / OUT: 900 mL / NET: -900 mL        PHYSICAL EXAM:  Vital Signs Last 24 Hrs  T(C): 36.3 (29 Jan 2022 04:06), Max: 36.7 (28 Jan 2022 21:09)  T(F): 97.3 (29 Jan 2022 04:06), Max: 98.1 (28 Jan 2022 21:09)  HR: 90 (29 Jan 2022 04:06) (90 - 94)  BP: 142/81 (29 Jan 2022 04:06) (126/88 - 142/81)  BP(mean): --  RR: 18 (29 Jan 2022 04:06) (17 - 18)  SpO2: 95% (29 Jan 2022 04:06) (95% - 97%)  GENERAL: NAD, well-developed  HEAD:  Atraumatic, Normocephalic  EYES: EOMI, PERRLA, conjunctiva and sclera clear  NECK: Supple, No JVD  CHEST/LUNG: Clear to auscultation bilaterally; No wheeze  HEART: Regular rate and rhythm; No murmurs, rubs, or gallops  ABDOMEN: Soft, Nontender, Nondistended; Bowel sounds present, + PEG  EXTREMITIES:  2+ Peripheral Pulses, No clubbing, cyanosis, or edema  PSYCH: Comfortable  NEUROLOGY: non-focal  SKIN: No rashes or lesions    LABS:                        12.7   6.54  )-----------( 97       ( 29 Jan 2022 07:23 )             39.0     01-29    133<L>  |  93<L>  |  19  ----------------------------<  79  4.1   |  28  |  0.47<L>    Ca    9.4      29 Jan 2022 07:16  Phos  5.5     01-27  Mg     2.0     01-27                RADIOLOGY & ADDITIONAL TESTS:    Imaging Personally Reviewed:    Consultant(s) Notes Reviewed:      Care Discussed with Consultants/Other Providers:

## 2022-01-30 NOTE — CHART NOTE - NSCHARTNOTEFT_GEN_A_CORE
GI following patient for PEG replacement. Replaced yesterday at bedside. Follow up x ray tube check showing good position. Can start using tube.     GI will sign off. Please call back with any further questions.     Blair Riley, PGY5  Gastroenterology/Hepatology Fellow  Available on [employer] Teams  [phone] ([employer] Short Range Pager)  [phone] (Long Range Pager)    After 5pm, please contact the on-call GI fellow. [phone]

## 2022-01-31 LAB
ANION GAP SERPL CALC-SCNC: 13 MMOL/L — SIGNIFICANT CHANGE UP (ref 5–17)
BUN SERPL-MCNC: 14 MG/DL — SIGNIFICANT CHANGE UP (ref 7–23)
CALCIUM SERPL-MCNC: 9 MG/DL — SIGNIFICANT CHANGE UP (ref 8.4–10.5)
CHLORIDE SERPL-SCNC: 95 MMOL/L — LOW (ref 96–108)
CO2 SERPL-SCNC: 23 MMOL/L — SIGNIFICANT CHANGE UP (ref 22–31)
CREAT SERPL-MCNC: 0.47 MG/DL — LOW (ref 0.5–1.3)
GLUCOSE BLDC GLUCOMTR-MCNC: 108 MG/DL — HIGH (ref 70–99)
GLUCOSE BLDC GLUCOMTR-MCNC: 109 MG/DL — HIGH (ref 70–99)
GLUCOSE BLDC GLUCOMTR-MCNC: 116 MG/DL — HIGH (ref 70–99)
GLUCOSE BLDC GLUCOMTR-MCNC: 88 MG/DL — SIGNIFICANT CHANGE UP (ref 70–99)
GLUCOSE BLDC GLUCOMTR-MCNC: 91 MG/DL — SIGNIFICANT CHANGE UP (ref 70–99)
GLUCOSE SERPL-MCNC: 96 MG/DL — SIGNIFICANT CHANGE UP (ref 70–99)
POTASSIUM SERPL-MCNC: 4.8 MMOL/L — SIGNIFICANT CHANGE UP (ref 3.5–5.3)
POTASSIUM SERPL-SCNC: 4.8 MMOL/L — SIGNIFICANT CHANGE UP (ref 3.5–5.3)
SODIUM SERPL-SCNC: 131 MMOL/L — LOW (ref 135–145)

## 2022-01-31 PROCEDURE — 99232 SBSQ HOSP IP/OBS MODERATE 35: CPT

## 2022-01-31 RX ORDER — SENNA PLUS 8.6 MG/1
2 TABLET ORAL AT BEDTIME
Refills: 0 | Status: DISCONTINUED | OUTPATIENT
Start: 2022-01-31 | End: 2022-02-01

## 2022-01-31 RX ADMIN — Medication 750 MILLIGRAM(S): at 21:29

## 2022-01-31 RX ADMIN — LACOSAMIDE 200 MILLIGRAM(S): 50 TABLET ORAL at 06:17

## 2022-01-31 RX ADMIN — LEVETIRACETAM 600 MILLIGRAM(S): 250 TABLET, FILM COATED ORAL at 05:54

## 2022-01-31 RX ADMIN — SODIUM CHLORIDE 3 MILLILITER(S): 9 INJECTION INTRAMUSCULAR; INTRAVENOUS; SUBCUTANEOUS at 17:47

## 2022-01-31 RX ADMIN — Medication 3 MILLILITER(S): at 05:55

## 2022-01-31 RX ADMIN — Medication 3 MILLILITER(S): at 12:36

## 2022-01-31 RX ADMIN — Medication 1 TABLET(S): at 12:37

## 2022-01-31 RX ADMIN — Medication 0.5 MILLIGRAM(S): at 05:55

## 2022-01-31 RX ADMIN — Medication 750 MILLIGRAM(S): at 05:55

## 2022-01-31 RX ADMIN — LACOSAMIDE 200 MILLIGRAM(S): 50 TABLET ORAL at 15:33

## 2022-01-31 RX ADMIN — Medication 1 TABLET(S): at 05:56

## 2022-01-31 RX ADMIN — SENNA PLUS 2 TABLET(S): 8.6 TABLET ORAL at 21:28

## 2022-01-31 RX ADMIN — Medication 0.5 MILLIGRAM(S): at 17:47

## 2022-01-31 RX ADMIN — Medication 220 MILLIGRAM(S): at 21:28

## 2022-01-31 RX ADMIN — Medication 3 MILLILITER(S): at 21:29

## 2022-01-31 RX ADMIN — LEVETIRACETAM 600 MILLIGRAM(S): 250 TABLET, FILM COATED ORAL at 17:48

## 2022-01-31 RX ADMIN — Medication 3 MILLILITER(S): at 17:47

## 2022-01-31 RX ADMIN — Medication 3 MILLILITER(S): at 15:30

## 2022-01-31 RX ADMIN — Medication 750 MILLIGRAM(S): at 15:30

## 2022-01-31 RX ADMIN — LACOSAMIDE 200 MILLIGRAM(S): 50 TABLET ORAL at 21:28

## 2022-01-31 RX ADMIN — SODIUM CHLORIDE 50 MILLILITER(S): 9 INJECTION, SOLUTION INTRAVENOUS at 21:29

## 2022-01-31 RX ADMIN — SODIUM CHLORIDE 3 MILLILITER(S): 9 INJECTION INTRAMUSCULAR; INTRAVENOUS; SUBCUTANEOUS at 05:55

## 2022-01-31 RX ADMIN — Medication 1 APPLICATION(S): at 12:36

## 2022-01-31 NOTE — PROGRESS NOTE ADULT - PROBLEM SELECTOR PLAN 5
-Fleet enema prn  -c/w senna, miralax and milk of magnesia via PEG
-Fleet enema prn  -c/w senna, miralax and milk of magnesia via PEG
c/w antiepileptics including Keppra, Vimpat and Valproic acid via PEG
-Fleet enema prn  -c/w senna, miralax and milk of magnesia via PEG
c/w antiepileptics including Keppra, Vimpat and Valproic acid via PEG
-c/w antiepileptics including Keppra, Vimpat and Valproic acid via PEG  -pending keppra level
c/w antiepileptics including Keppra, Vimpat and Valproic acid via PEG
c/w antiepileptics including Keppra, Vimpat and Valproic acid via PEG

## 2022-01-31 NOTE — PROGRESS NOTE ADULT - PROBLEM SELECTOR PROBLEM 4
Seizure disorder
Cerebral palsy
Seizure disorder
Cerebral palsy
Seizure disorder
Cerebral palsy

## 2022-01-31 NOTE — PROGRESS NOTE ADULT - SUBJECTIVE AND OBJECTIVE BOX
PROGRESS NOTE:   Cyndi Mccarty DO  Hospitalist  Pager [phone]  After 5pm/weekends or if no answer ext: [phone]      Patient is a 56y old  Female who presents with a chief complaint of Worsening productive cough, hemoptysis (30 Jan 2022 11:56)      SUBJECTIVE / OVERNIGHT EVENTS:    ADDITIONAL REVIEW OF SYSTEMS:    MEDICATIONS  (STANDING):  albuterol/ipratropium for Nebulization 3 milliLiter(s) Nebulizer every 4 hours  buDESOnide    Inhalation Suspension 0.5 milliGRAM(s) Inhalation two times a day  calcium carbonate    500 mG (Tums) Chewable 1 Tablet(s) Enteral Tube daily  dextrose 5%. 1000 milliLiter(s) (50 mL/Hr) IV Continuous <Continuous>  ferrous    sulfate Liquid 220 milliGRAM(s) Enteral Tube at bedtime  lacosamide Solution 200 milliGRAM(s) Oral <User Schedule>  levETIRAcetam  Solution 600 milliGRAM(s) Enteral Tube two times a day  Nephro-rah 1 Tablet(s) Oral daily  senna 2 Tablet(s) Oral at bedtime  sodium chloride 0.9% for Nebulization 3 milliLiter(s) Nebulizer two times a day  valproic  acid Syrup 750 milliGRAM(s) Enteral Tube three times a day  vitamin A &amp; D Ointment 1 Application(s) Topical daily    MEDICATIONS  (PRN):  aluminum hydroxide/magnesium hydroxide/simethicone Suspension 30 milliLiter(s) Enteral Tube every 4 hours PRN Dyspepsia  magnesium hydroxide Suspension 30 milliLiter(s) Oral daily PRN Constipation  mineral oil/petrolatum Hydrophilic Ointment 1 Application(s) Topical four times a day PRN Skin care  ondansetron Injectable 4 milliGRAM(s) IV Push every 8 hours PRN Nausea and/or Vomiting  saline laxative (FLEET) Rectal Enema 1 Enema Rectal once PRN constipation      CAPILLARY BLOOD GLUCOSE      POCT Blood Glucose.: 91 mg/dL (31 Jan 2022 12:23)  POCT Blood Glucose.: 108 mg/dL (31 Jan 2022 06:25)  POCT Blood Glucose.: 88 mg/dL (31 Jan 2022 00:14)  POCT Blood Glucose.: 100 mg/dL (30 Jan 2022 18:58)  POCT Blood Glucose.: 78 mg/dL (30 Jan 2022 17:46)    I&O's Summary    30 Jan 2022 07:01  -  31 Jan 2022 07:00  --------------------------------------------------------  IN: 1120 mL / OUT: 800 mL / NET: 320 mL        PHYSICAL EXAM:  Vital Signs Last 24 Hrs  T(C): 36.1 (31 Jan 2022 05:34), Max: 36.3 (30 Jan 2022 14:41)  T(F): 97 (31 Jan 2022 05:34), Max: 97.3 (30 Jan 2022 14:41)  HR: 83 (31 Jan 2022 11:53) (67 - 83)  BP: 153/93 (31 Jan 2022 05:34) (120/70 - 153/93)  BP(mean): --  RR: 18 (31 Jan 2022 11:53) (17 - 18)  SpO2: 98% (31 Jan 2022 11:53) (96% - 98%)    CONSTITUTIONAL: NAD, well-developed  RESPIRATORY: Normal respiratory effort; lungs are clear to auscultation bilaterally  CARDIOVASCULAR: Regular rate and rhythm, normal S1 and S2, no murmur/rub/gallop; No lower extremity edema; Peripheral pulses are 2+ bilaterally  ABDOMEN: Nontender to palpation, normoactive bowel sounds, no rebound/guarding; No hepatosplenomegaly  MUSCLOSKELETAL: no clubbing or cyanosis of digits; no joint swelling or tenderness to palpation  PSYCH: A+O to person, place, and time; affect appropriate    LABS:    01-31    131<L>  |  95<L>  |  14  ----------------------------<  96  4.8   |  23  |  0.47<L>    Ca    9.0      31 Jan 2022 10:30                  RADIOLOGY & ADDITIONAL TESTS:  Results Reviewed:   Imaging Personally Reviewed:  Electrocardiogram Personally Reviewed:    COORDINATION OF CARE:  Care Discussed with Consultants/Other Providers [Y/N]:  Prior or Outpatient Records Reviewed [Y/N]:

## 2022-01-31 NOTE — PROGRESS NOTE ADULT - PROBLEM SELECTOR PLAN 4
c/w antiepileptics
c/w antiepileptics
-c/w antiepileptics
c/w antiepileptics
c/w antiepileptics
-c/w antiepileptics including Keppra, Vimpat and Valproic acid via PEG  -pending keppra level
c/w antiepileptics
-c/w antiepileptics including Keppra, Vimpat and Valproic acid via PEG  -pending keppra level
c/w antiepileptics
-c/w antiepileptics including Keppra, Vimpat and Valproic acid via PEG  -pending keppra level
c/w antiepileptics
c/w antiepileptics

## 2022-01-31 NOTE — PROGRESS NOTE ADULT - PROBLEM SELECTOR PROBLEM 3
Thrombocytopenia
Cerebral palsy

## 2022-01-31 NOTE — PROGRESS NOTE ADULT - REASON FOR ADMISSION
Worsening productive cough, hemoptysis

## 2022-01-31 NOTE — PROGRESS NOTE ADULT - PROBLEM SELECTOR PROBLEM 2
S/P percutaneous endoscopic gastrostomy (PEG) tube placement

## 2022-01-31 NOTE — PROGRESS NOTE ADULT - PROBLEM SELECTOR PROBLEM 6
Constipation
Prophylactic measure
Constipation
Prophylactic measure
Constipation
Constipation
Prophylactic measure
Constipation
Constipation

## 2022-01-31 NOTE — PROGRESS NOTE ADULT - PROBLEM SELECTOR PLAN 3
-c/w antiepileptics
platelets slowly improving   no bleeding, hgb wnl  4T score 2 points, unlikely HIT
platelets slowly downtrending  no bleeding, hgb wnl  4T score 2 points, unlikely HIT
-c/w antiepileptics
platelets slowly improving   no bleeding, hgb wnl  4T score 2 points, unlikely HIT
-c/w antiepileptics
platelets slowly improving   no bleeding, hgb wnl  4T score 2 points, unlikely HIT

## 2022-01-31 NOTE — PROGRESS NOTE ADULT - PROBLEM SELECTOR PLAN 6
-Diet: NPO  -DVT ppx: HSQ    Eventual dispo is back to group home
Fleet enema prn  c/w senna, miralax standing  milk of magnesia via PEG PRN
-Fleet enema prn - told nurse to administer today  -c/w senna, miralax standing  -milk of magnesia via PEG PRN
-Diet: NPO  -DVT ppx: HSQ    Eventual dispo is back to group home
Fleet enema prn  c/w senna, miralax standing  milk of magnesia via PEG PRN
-Diet: NPO  -DVT ppx: HSQ
Fleet enema prn  c/w senna, miralax standing  milk of magnesia via PEG PRN

## 2022-01-31 NOTE — PROGRESS NOTE ADULT - PROBLEM SELECTOR PLAN 1
-sputum notably with streaks of blood per picture shown by aide on admission   -CTA negative for PE atelectasis at the bases, unchanged tree-in-bud nodularity, with several of the nodules demonstrating high density, are likely related to chronic inspissated mucus/small airways disease. Stable 0.5 cm left lower lobe pulmonary nodule. No consolidation  -c/w Duonebs and saline nebulizer(mucolytic)  -chest PT  -not currently having hemoptysis  -pt requires suctioning, required suctioning x4 yesterday  -sating 97% on room air
-sputum notably with streaks of blood per picture shown by aide on admission   -CTA negative for PE atelectasis at the bases, unchanged tree-in-bud nodularity, with several of the nodules demonstrating high density, are likely related to chronic inspissated mucus/small airways disease. Stable 0.5 cm left lower lobe pulmonary nodule. No consolidation  -c/w Duonebs and saline nebulizer(mucolytic)  -chest PT  -not currently having hemoptysis  - No longer equiring suctioning  -sating 97% on room air
-sputum notably with streaks of blood per picture shown by aide on admission   -could be bronchiectasis given h/o pseudomonas aspiration PNA vs chronic bronchitis   -CTA negative for PE atelectasis at the bases, unchanged tree-in-bud nodularity, with several of the nodules demonstrating high density, are likely related to chronic inspissated mucus/small airways disease. Stable 0.5 cm left lower lobe pulmonary nodule. No consolidation  -c/w Duonebs and saline nebulizer(mucolytic)  -chest PT  -not currently having hemoptysis  -pt requires suctioning   -wean off O2 prn
-sputum notably with streaks of blood per picture shown by aide on admission   -could be bronchiectasis given h/o pseudomonas aspiration PNA vs chronic bronchitis   -CTA negative for PE atelectasis at the bases, unchanged tree-in-bud nodularity, with several of the nodules demonstrating high density, are likely related to chronic inspissated mucus/small airways disease. Stable 0.5 cm left lower lobe pulmonary nodule. No consolidation  -c/w Duonebs and saline nebulizer(mucolytic)  -chest PT  -not currently having hemoptysis  -wean off O2 prn
-sputum notably with streaks of blood per picture shown by aide on admission   -CTA negative for PE atelectasis at the bases, unchanged tree-in-bud nodularity, with several of the nodules demonstrating high density, are likely related to chronic inspissated mucus/small airways disease. Stable 0.5 cm left lower lobe pulmonary nodule. No consolidation  -c/w Duonebs and saline nebulizer(mucolytic)  -chest PT  -not currently having hemoptysis  -per RN pt not requiring suctioning today   -sating 97% on room air
-sputum notably with streaks of blood per picture shown by aide on admission   -could be bronchiectasis given h/o pseudomonas aspiration PNA vs chronic bronchitis   -CTA negative for PE atelectasis at the bases, unchanged tree-in-bud nodularity, with several of the nodules demonstrating high density, are likely related to chronic inspissated mucus/small airways disease. Stable 0.5 cm left lower lobe pulmonary nodule. No consolidation  -c/w Duonebs and saline nebulizer(mucolytic)  -chest PT  -not currently having hemoptysis  -wean off O2 prn
-sputum notably with streaks of blood per picture shown by aide on admission   -could be bronchiectasis given h/o pseudomonas aspiration PNA vs chronic bronchitis   -CTA negative for PE atelectasis at the bases, unchanged tree-in-bud nodularity, with several of the nodules demonstrating high density, are likely related to chronic inspissated mucus/small airways disease. Stable 0.5 cm left lower lobe pulmonary nodule. No consolidation  -c/w Duonebs and saline nebulizer(mucolytic)  -chest PT  -not currently having hemoptysis  -pt requires suctioning   -wean off O2 prn
-sputum notably with streaks of blood per picture shown by aide  -could be bronchiectasis given h/o pseudomonas aspiration PNA vs chronic bronchitis vs less likely PE   -will obtain CTA to r/o PE and evaluate for bronchiectatic lung disease  -c/w Duonebs and saline nebulizer(mucolytic)  -chest PT  -f/u sputum Cx  -trend cbc
-sputum notably with streaks of blood per picture shown by aide on admission   -CTA negative for PE atelectasis at the bases, unchanged tree-in-bud nodularity, with several of the nodules demonstrating high density, are likely related to chronic inspissated mucus/small airways disease. Stable 0.5 cm left lower lobe pulmonary nodule. No consolidation  -c/w Duonebs and saline nebulizer(mucolytic)  -chest PT  -not currently having hemoptysis  - No longer equiring suctioning  -sating 97% on room air
-sputum notably with streaks of blood per picture shown by aide on admission   -CTA negative for PE atelectasis at the bases, unchanged tree-in-bud nodularity, with several of the nodules demonstrating high density, are likely related to chronic inspissated mucus/small airways disease. Stable 0.5 cm left lower lobe pulmonary nodule. No consolidation  -c/w Duonebs and saline nebulizer(mucolytic)  -chest PT  -not currently having hemoptysis  - No longer equiring suctioning  -sating 97% on room air
-sputum notably with streaks of blood per picture shown by aide on admission   -could be bronchiectasis given h/o pseudomonas aspiration PNA vs chronic bronchitis   -CTA negative for PE atelectasis at the bases, unchanged tree-in-bud nodularity, with several of the nodules demonstrating high density, are likely related to chronic inspissated mucus/small airways disease. Stable 0.5 cm left lower lobe pulmonary nodule. No consolidation  -c/w Duonebs and saline nebulizer(mucolytic)  -chest PT  -f/u sputum Cx  -trend cbc  -not currently having hemoptysis  -sating 93% on 2L NC, wean off O2
-sputum notably with streaks of blood per picture shown by aide on admission   -CTA negative for PE atelectasis at the bases, unchanged tree-in-bud nodularity, with several of the nodules demonstrating high density, are likely related to chronic inspissated mucus/small airways disease. Stable 0.5 cm left lower lobe pulmonary nodule. No consolidation  -c/w Duonebs and saline nebulizer(mucolytic)  -chest PT  -not currently having hemoptysis  -pt requires suctioning   -wean off O2

## 2022-01-31 NOTE — PROGRESS NOTE ADULT - NSPROGADDITIONALINFOA_GEN_ALL_CORE
D/w daughter 1/24 updated on full plan of care
D/W NP Lissette Rowan.    Renetta Carvajal, DO  Available on Teams tono
D/w mother 1/20 updated on full plan of care
D/W NP Karyn Carvajal, DO  Available on Teams tono
Dw mother 1/28 updated on full plan of care
moved feeds to goal- if still not requiring suctioning then will DC home 2/1

## 2022-01-31 NOTE — PROGRESS NOTE ADULT - PROBLEM SELECTOR PLAN 7
Diet: NPO, tube feeds  DVT ppx: scds    Dispo is back to group home, had family meeting 1/28 with mother, staff at her group home, our SARA, RN and dietician
Diet: NPO, tube feeds  DVT ppx: HSQ    Due to suctioning requirements, pt cannot go back to group home, discussed with social work, pending safe discharge plan for patient
Diet: NPO, tube feeds  DVT ppx: HSQ    Pending meeting with family, group home and SW on friday to discuss disposition
Diet: NPO, tube feeds  DVT ppx: scds    Dispo is back to group home, had family meeting 1/28 with mother, staff at her group home, our SARA, RN and dietician
Diet: NPO, tube feeds  DVT ppx: scds    Dispo is back to group home, had family meeting 1/28 with mother, staff at her group home, our SARA, RN and dietician
-Diet: NPO, tube feeds  -DVT ppx: HSQ    Eventual dispo is back to group home
Diet: NPO, tube feeds  DVT ppx: scds    Pending meeting with family, group home and SW on friday to discuss disposition
Diet: NPO, tube feeds  DVT ppx: HSQ    Pending meeting with family, group home and SW on friday to discuss disposition
Diet: NPO, tube feeds  DVT ppx: scds    Dispo is back to group home, had family meeting 1/28 with mother, staff at her group home, our SARA, RN and dietician

## 2022-01-31 NOTE — PROGRESS NOTE ADULT - PROBLEM SELECTOR PROBLEM 5
Seizure disorder
Constipation
Seizure disorder
Constipation
Constipation
Seizure disorder

## 2022-01-31 NOTE — PROGRESS NOTE ADULT - PROVIDER SPECIALTY LIST ADULT
Hospitalist
Internal Medicine
Hospitalist
Internal Medicine
Hospitalist

## 2022-02-01 ENCOUNTER — TRANSCRIPTION ENCOUNTER (OUTPATIENT)
Age: 57
End: 2022-02-01

## 2022-02-01 VITALS
TEMPERATURE: 97 F | HEART RATE: 80 BPM | RESPIRATION RATE: 17 BRPM | SYSTOLIC BLOOD PRESSURE: 106 MMHG | OXYGEN SATURATION: 96 % | DIASTOLIC BLOOD PRESSURE: 69 MMHG

## 2022-02-01 LAB
GLUCOSE BLDC GLUCOMTR-MCNC: 104 MG/DL — HIGH (ref 70–99)
GLUCOSE BLDC GLUCOMTR-MCNC: 112 MG/DL — HIGH (ref 70–99)
GLUCOSE BLDC GLUCOMTR-MCNC: 118 MG/DL — HIGH (ref 70–99)
GLUCOSE BLDC GLUCOMTR-MCNC: 120 MG/DL — HIGH (ref 70–99)
GLUCOSE BLDC GLUCOMTR-MCNC: 61 MG/DL — LOW (ref 70–99)
GLUCOSE BLDC GLUCOMTR-MCNC: 70 MG/DL — SIGNIFICANT CHANGE UP (ref 70–99)
GLUCOSE BLDC GLUCOMTR-MCNC: 86 MG/DL — SIGNIFICANT CHANGE UP (ref 70–99)
GLUCOSE BLDC GLUCOMTR-MCNC: 98 MG/DL — SIGNIFICANT CHANGE UP (ref 70–99)
GLUCOSE BLDC GLUCOMTR-MCNC: 99 MG/DL — SIGNIFICANT CHANGE UP (ref 70–99)

## 2022-02-01 PROCEDURE — 82962 GLUCOSE BLOOD TEST: CPT

## 2022-02-01 PROCEDURE — 82746 ASSAY OF FOLIC ACID SERUM: CPT

## 2022-02-01 PROCEDURE — 82565 ASSAY OF CREATININE: CPT

## 2022-02-01 PROCEDURE — U0003: CPT

## 2022-02-01 PROCEDURE — 83735 ASSAY OF MAGNESIUM: CPT

## 2022-02-01 PROCEDURE — 85025 COMPLETE CBC W/AUTO DIFF WBC: CPT

## 2022-02-01 PROCEDURE — 80164 ASSAY DIPROPYLACETIC ACD TOT: CPT

## 2022-02-01 PROCEDURE — 84145 PROCALCITONIN (PCT): CPT

## 2022-02-01 PROCEDURE — 85014 HEMATOCRIT: CPT

## 2022-02-01 PROCEDURE — 85027 COMPLETE CBC AUTOMATED: CPT

## 2022-02-01 PROCEDURE — 85730 THROMBOPLASTIN TIME PARTIAL: CPT

## 2022-02-01 PROCEDURE — 86900 BLOOD TYPING SEROLOGIC ABO: CPT

## 2022-02-01 PROCEDURE — 82947 ASSAY GLUCOSE BLOOD QUANT: CPT

## 2022-02-01 PROCEDURE — 71045 X-RAY EXAM CHEST 1 VIEW: CPT

## 2022-02-01 PROCEDURE — 82803 BLOOD GASES ANY COMBINATION: CPT

## 2022-02-01 PROCEDURE — 86901 BLOOD TYPING SEROLOGIC RH(D): CPT

## 2022-02-01 PROCEDURE — C9254: CPT

## 2022-02-01 PROCEDURE — 82607 VITAMIN B-12: CPT

## 2022-02-01 PROCEDURE — 74177 CT ABD & PELVIS W/CONTRAST: CPT

## 2022-02-01 PROCEDURE — 84132 ASSAY OF SERUM POTASSIUM: CPT

## 2022-02-01 PROCEDURE — 85018 HEMOGLOBIN: CPT

## 2022-02-01 PROCEDURE — 82435 ASSAY OF BLOOD CHLORIDE: CPT

## 2022-02-01 PROCEDURE — 93005 ELECTROCARDIOGRAM TRACING: CPT

## 2022-02-01 PROCEDURE — U0005: CPT

## 2022-02-01 PROCEDURE — 51701 INSERT BLADDER CATHETER: CPT

## 2022-02-01 PROCEDURE — 84100 ASSAY OF PHOSPHORUS: CPT

## 2022-02-01 PROCEDURE — 84443 ASSAY THYROID STIM HORMONE: CPT

## 2022-02-01 PROCEDURE — 80048 BASIC METABOLIC PNL TOTAL CA: CPT

## 2022-02-01 PROCEDURE — 74176 CT ABD & PELVIS W/O CONTRAST: CPT | Mod: MA

## 2022-02-01 PROCEDURE — 82330 ASSAY OF CALCIUM: CPT

## 2022-02-01 PROCEDURE — 94640 AIRWAY INHALATION TREATMENT: CPT

## 2022-02-01 PROCEDURE — 83605 ASSAY OF LACTIC ACID: CPT

## 2022-02-01 PROCEDURE — 49465 FLUORO EXAM OF G/COLON TUBE: CPT

## 2022-02-01 PROCEDURE — 0225U NFCT DS DNA&RNA 21 SARSCOV2: CPT

## 2022-02-01 PROCEDURE — 36415 COLL VENOUS BLD VENIPUNCTURE: CPT

## 2022-02-01 PROCEDURE — 99285 EMERGENCY DEPT VISIT HI MDM: CPT | Mod: 25

## 2022-02-01 PROCEDURE — 83540 ASSAY OF IRON: CPT

## 2022-02-01 PROCEDURE — 85610 PROTHROMBIN TIME: CPT

## 2022-02-01 PROCEDURE — 80177 DRUG SCRN QUAN LEVETIRACETAM: CPT

## 2022-02-01 PROCEDURE — 84295 ASSAY OF SERUM SODIUM: CPT

## 2022-02-01 PROCEDURE — 83550 IRON BINDING TEST: CPT

## 2022-02-01 PROCEDURE — 99232 SBSQ HOSP IP/OBS MODERATE 35: CPT

## 2022-02-01 PROCEDURE — 80053 COMPREHEN METABOLIC PANEL: CPT

## 2022-02-01 PROCEDURE — 71275 CT ANGIOGRAPHY CHEST: CPT

## 2022-02-01 PROCEDURE — 82728 ASSAY OF FERRITIN: CPT

## 2022-02-01 PROCEDURE — 86850 RBC ANTIBODY SCREEN: CPT

## 2022-02-01 RX ORDER — SENNA PLUS 8.6 MG/1
2 TABLET ORAL
Qty: 60 | Refills: 0
Start: 2022-02-01 | End: 2022-03-02

## 2022-02-01 RX ORDER — SALICYLIC ACID 0.5 %
1 CLEANSER (GRAM) TOPICAL
Qty: 30 | Refills: 0
Start: 2022-02-01 | End: 2022-03-02

## 2022-02-01 RX ORDER — ALBUTEROL 90 UG/1
3 AEROSOL, METERED ORAL
Qty: 540 | Refills: 0
Start: 2022-02-01 | End: 2022-03-02

## 2022-02-01 RX ORDER — SENNA PLUS 8.6 MG/1
2 TABLET ORAL
Qty: 0 | Refills: 0 | DISCHARGE
Start: 2022-02-01

## 2022-02-01 RX ORDER — FERROUS SULFATE 325(65) MG
5 TABLET ORAL
Qty: 150 | Refills: 0
Start: 2022-02-01 | End: 2022-03-02

## 2022-02-01 RX ORDER — LACOSAMIDE 50 MG/1
20 TABLET ORAL
Qty: 1800 | Refills: 0
Start: 2022-02-01 | End: 2022-03-02

## 2022-02-01 RX ORDER — LEVETIRACETAM 250 MG/1
6 TABLET, FILM COATED ORAL
Qty: 360 | Refills: 0
Start: 2022-02-01 | End: 2022-03-02

## 2022-02-01 RX ORDER — CALCIUM CARBONATE 500(1250)
1 TABLET ORAL
Qty: 30 | Refills: 0
Start: 2022-02-01 | End: 2022-03-02

## 2022-02-01 RX ORDER — VALPROIC ACID (AS SODIUM SALT) 250 MG/5ML
15 SOLUTION, ORAL ORAL
Qty: 1350 | Refills: 0
Start: 2022-02-01 | End: 2022-03-02

## 2022-02-01 RX ORDER — BUDESONIDE, MICRONIZED 100 %
2 POWDER (GRAM) MISCELLANEOUS
Qty: 120 | Refills: 0
Start: 2022-02-01 | End: 2022-03-02

## 2022-02-01 RX ADMIN — Medication 3 MILLILITER(S): at 12:31

## 2022-02-01 RX ADMIN — Medication 3 MILLILITER(S): at 13:09

## 2022-02-01 RX ADMIN — Medication 750 MILLIGRAM(S): at 05:50

## 2022-02-01 RX ADMIN — Medication 1 APPLICATION(S): at 12:32

## 2022-02-01 RX ADMIN — LACOSAMIDE 200 MILLIGRAM(S): 50 TABLET ORAL at 17:00

## 2022-02-01 RX ADMIN — LACOSAMIDE 200 MILLIGRAM(S): 50 TABLET ORAL at 06:37

## 2022-02-01 RX ADMIN — Medication 3 MILLILITER(S): at 05:38

## 2022-02-01 RX ADMIN — Medication 3 MILLILITER(S): at 02:46

## 2022-02-01 RX ADMIN — LEVETIRACETAM 600 MILLIGRAM(S): 250 TABLET, FILM COATED ORAL at 17:01

## 2022-02-01 RX ADMIN — Medication 3 MILLILITER(S): at 17:00

## 2022-02-01 RX ADMIN — Medication 0.5 MILLIGRAM(S): at 17:00

## 2022-02-01 RX ADMIN — SODIUM CHLORIDE 3 MILLILITER(S): 9 INJECTION INTRAMUSCULAR; INTRAVENOUS; SUBCUTANEOUS at 17:01

## 2022-02-01 RX ADMIN — SODIUM CHLORIDE 3 MILLILITER(S): 9 INJECTION INTRAMUSCULAR; INTRAVENOUS; SUBCUTANEOUS at 05:49

## 2022-02-01 RX ADMIN — Medication 0.5 MILLIGRAM(S): at 05:21

## 2022-02-01 RX ADMIN — LEVETIRACETAM 600 MILLIGRAM(S): 250 TABLET, FILM COATED ORAL at 05:51

## 2022-02-01 RX ADMIN — Medication 1 TABLET(S): at 12:33

## 2022-02-01 RX ADMIN — Medication 750 MILLIGRAM(S): at 13:09

## 2022-02-01 RX ADMIN — Medication 1 TABLET(S): at 05:50

## 2022-02-01 NOTE — PROVIDER CONTACT NOTE (HYPOGLYCEMIA EVENT) - NS PROVIDER CONTACT SITUATION-HYPO
02/26/21 0909   Team Meeting   Meeting Type Daily Rounds   Team Members Present   Team Members Present Physician;Nurse;; Other (Discipline and Name)   Physician Team Member Dr BRODERICK   Nursing Team Member [address] Management Team Member Harriett Montgomery   Other (Discipline and Name)    Patient/Family Present   Patient Present No   Patient's Family Present No     Pt remains disorganized, paranoid and suspicious  Pt seen on the unit; often loud, intrusive, delusional "call [employer] buddies"  303 hearing was upheld this morning; meds over objection  Continue med management  Discharge to be determined  pt admitted for hemoptysis resolved), history of cerebral palsy, patient is alert, non verbal at baseline. patient assessed, no distress noted, no non verbal signs of distress noted.

## 2022-02-01 NOTE — PROVIDER CONTACT NOTE (HYPOGLYCEMIA EVENT) - NS PROVIDER CONTACT BACKGROUND-HYPO
Age: 56y    Gender: Female    POCT Blood Glucose:  70 mg/dL (02-01-22 @ 17:07)  61 mg/dL (02-01-22 @ 17:04)  112 mg/dL (02-01-22 @ 12:43)  118 mg/dL (02-01-22 @ 06:14)  109 mg/dL (01-31-22 @ 23:45)  116 mg/dL (01-31-22 @ 17:58)      eMAR:

## 2022-02-01 NOTE — DISCHARGE NOTE NURSING/CASE MANAGEMENT/SOCIAL WORK - PATIENT PORTAL LINK FT
You can access the FollowMyHealth Patient Portal offered by [facility] by registering at the following website: [email or web address]. By joining [facility]’s FollowMyHealth portal, you will also be able to view your health information using other applications (apps) compatible with our system.

## 2022-02-01 NOTE — DISCHARGE NOTE NURSING/CASE MANAGEMENT/SOCIAL WORK - NSDCPEFALRISK_GEN_ALL_CORE
For information on Fall & Injury Prevention, visit: [email or web address] OR  [email or web address] OR  https://www.cdc.gov/steadi/patient.html

## 2022-02-02 ENCOUNTER — NON-APPOINTMENT (OUTPATIENT)
Age: 57
End: 2022-02-02

## 2022-02-03 ENCOUNTER — INPATIENT (INPATIENT)
Facility: HOSPITAL | Age: 57
LOS: 6 days | Discharge: NURSING, REHAB OR LONG-TERM CARE | DRG: 205 | End: 2022-02-10
Attending: HOSPITALIST | Admitting: STUDENT IN AN ORGANIZED HEALTH CARE EDUCATION/TRAINING PROGRAM
Payer: MEDICARE

## 2022-02-03 VITALS
SYSTOLIC BLOOD PRESSURE: 112 MMHG | DIASTOLIC BLOOD PRESSURE: 64 MMHG | OXYGEN SATURATION: 96 % | HEIGHT: 60 IN | HEART RATE: 80 BPM | RESPIRATION RATE: 18 BRPM | WEIGHT: 199.96 LBS

## 2022-02-03 DIAGNOSIS — Z93.1 GASTROSTOMY STATUS: Chronic | ICD-10-CM

## 2022-02-03 PROCEDURE — 71045 X-RAY EXAM CHEST 1 VIEW: CPT | Mod: 26

## 2022-02-03 PROCEDURE — 99285 EMERGENCY DEPT VISIT HI MDM: CPT | Mod: GC

## 2022-02-03 RX ORDER — OLANZAPINE 15 MG/1
2.5 TABLET, FILM COATED ORAL ONCE
Refills: 0 | Status: COMPLETED | OUTPATIENT
Start: 2022-02-03 | End: 2022-02-03

## 2022-02-03 RX ADMIN — OLANZAPINE 2.5 MILLIGRAM(S): 15 TABLET, FILM COATED ORAL at 23:11

## 2022-02-03 NOTE — ED PROVIDER NOTE - CLINICAL SUMMARY MEDICAL DECISION MAKING FREE TEXT BOX
Annie DO PGY-2: pt seen w/ MS4. Pt presents due to increasing oral sections (group home paperwork says pt sent in for "dysphagia screen"). In ED eval room pt hypoxia on RA to 89%. Placed on NC to 2L. TBA given that group home does not have suction capabilities and pt is requiring supplemental O2 which is new for the pt. Per group home aid, pt has not had any f/c.

## 2022-02-03 NOTE — ED ADULT NURSE NOTE - NSIMPLEMENTINTERV_GEN_ALL_ED
Implemented All Fall with Harm Risk Interventions:  [place] to call system. Call bell, personal items and telephone within reach. Instruct patient to call for assistance. Room bathroom lighting operational. Non-slip footwear when patient is off stretcher. Physically safe environment: no spills, clutter or unnecessary equipment. Stretcher in lowest position, wheels locked, appropriate side rails in place. Provide visual cue, wrist band, yellow gown, etc. Monitor gait and stability. Monitor for mental status changes and reorient to person, place, and time. Review medications for side effects contributing to fall risk. Reinforce activity limits and safety measures with patient and family. Provide visual clues: red socks.

## 2022-02-03 NOTE — ED ADULT NURSE NOTE - ED STAT RN HANDOFF DETAILS 2
COVID-19 Pre-surgery screenin. Do you have an undiagnosed respiratory illness or symptoms such as coughing or sneezing? No  a. Onset of Sx No  b. Acute vs. chronic respiratory illness No    2. Do you have an unexplained fever greater than 100.4 degrees Fahrenheit or 38 degrees Celsius?     No     3. Have you had direct exposure to a patient who tested positive for Covid-19?    No     4. Have you had any loss of your sense of taste or smell? Have you had N/V or sore throat? No    Patient has been informed of visitor policy and asked to wear a mask upon entering the hospital   Yes        Report received from RN Rosa patient resting comfortably awaiting bed assignment  no signs of distress observed comfort measures provided.

## 2022-02-03 NOTE — ED PROVIDER NOTE - ATTENDING CONTRIBUTION TO CARE
Patient is a 57 yo F with history of cerebral palsy, seizures, sent in from her group home for increased secretions and need for suctioning. Patient is non-verbal. Per aide at bedside, patient requires frequent suctioning which group home does not have capability of. Aide states patient was a patient for years and this is a problem that has developed recently. Aide states patient turns red, is unable to clear secretions and they are concerned she will choke. Patient noted to be saturating at 93% on RA on arrival, was placed on 2 L NC.     VS noted  Gen: coughing, turns red, bedbound, non-verbal  HEENT: EOMI, mmm  Lungs: rhonchi  CVS: RRR   Abd; Soft non tender, non distended, PEG tube in place  Ext: no edema  Skin: no rash  Neuro: awake  a/p: hypoxia, increased secretions. Will check labs, CXR to eval for pneumonia. TBA as patient cannot be sent back to group home  - Donnie PAYAN

## 2022-02-03 NOTE — ED PROVIDER NOTE - OBJECTIVE STATEMENT
55yo woman w/ PMH of cereral palsy, seizures, multiple recurrent PNAs requiring hospitalization, last hospitalized last week requiring intubation s/p PEG tube placement sent from her group home for concerns of increased oral secretions. Pt is nonverbal so history taken from DSP () by bedside. After PEG tube placement, pt has been NPO at group home. DSP noticed pt having increased oral secretions throughout the day. Pt occasionally chokes on her secretions and turns red in the face. DSP denies pt vomiting, coughing, fevers, sick contacts. 55yo woman w/ PMH of cereral palsy, seizures, multiple recurrent PNAs requiring hospitalization, most recently hospitalized 2 days ago requiring intubation s/p PEG tube placement sent from her group home for concerns of increased oral secretions. Pt is nonverbal so history taken from DSP () by bedside. After PEG tube placement, pt has been NPO at group home. DSP noticed pt having increased oral secretions throughout the day. Pt occasionally chokes on her secretions and turns red in the face. DSP denies pt vomiting, coughing, fevers, sick contacts. MS4: 57yo woman w/ PMH of cereral palsy, seizures, multiple recurrent PNAs requiring hospitalization, most recently hospitalized 2 days ago requiring intubation s/p PEG tube placement sent from her group home for concerns of increased oral secretions. Pt is nonverbal so history taken from DSP () by bedside. After PEG tube placement, pt has been NPO at group home. DSP noticed pt having increased oral secretions throughout the day. Pt occasionally chokes on her secretions and turns red in the face. DSP denies pt vomiting, coughing, fevers, sick contacts.    O'[place] DO PGY-2: additional info: in ED pt noted to be satting 93% on RA. Placed on 2L NC MS4: 57yo woman w/ PMH of cereral palsy, seizures, multiple recurrent PNAs requiring hospitalization, most recently hospitalized 2 days ago requiring intubation s/p PEG tube placement sent from her group home for concerns of increased oral secretions. Pt is nonverbal so history taken from DSP () by bedside. After PEG tube placement, pt has been NPO at group home. DSP noticed pt having increased oral secretions throughout the day. Pt occasionally chokes on her secretions and turns red in the face. DSP denies pt vomiting, coughing, fevers, sick contacts.    O[place] DO PGY-2: additional info: in ED pt noted to be satting 93% on RA. Placed on 2L NC. Per EMS, pt was sent in due to increasing secretions group home does not have capcbilities for suction.

## 2022-02-03 NOTE — ED ADULT NURSE REASSESSMENT NOTE - NS ED NURSE REASSESS COMMENT FT1
Patient lives in an assisted living this is the contact information   [phone]- Nurse Stephie  [phone]- manager

## 2022-02-03 NOTE — ED PROVIDER NOTE - CONSTITUTIONAL, MLM
normal... Well appearing, awake, alert, oriented to person, place, time/situation and in no apparent distress. Well appearing, awake, in no apparent distress.

## 2022-02-03 NOTE — ED ADULT NURSE NOTE - OBJECTIVE STATEMENT
Patient is a 56 yr old female from a group who presents to ED via EMS due to oral secretions. Per aide at bedside patient was just sent back to group home 2 days ago and is having a lot of oral secretions and group home does not have capabalitiies to suctions patient. Upon assessment, patient is AOx1, non verbal at baseline, satting 89 % on RA, placed on 4 LNC now satting 98%, abdomen soft/round/non tender to palpation, +PEG with abdominal binder, bilateral arm ecchymosis from previous venipuncture from prev hospital stay, +pulses, generalized edema, and shows no signs of any pain/discomfort. Provider assessed at bedside, sonam attempted x2 by RN, MD made aware that patient needs an USIV

## 2022-02-04 DIAGNOSIS — J96.01 ACUTE RESPIRATORY FAILURE WITH HYPOXIA: ICD-10-CM

## 2022-02-04 DIAGNOSIS — K59.00 CONSTIPATION, UNSPECIFIED: ICD-10-CM

## 2022-02-04 DIAGNOSIS — R09.02 HYPOXEMIA: ICD-10-CM

## 2022-02-04 DIAGNOSIS — R56.9 UNSPECIFIED CONVULSIONS: ICD-10-CM

## 2022-02-04 DIAGNOSIS — Z29.9 ENCOUNTER FOR PROPHYLACTIC MEASURES, UNSPECIFIED: ICD-10-CM

## 2022-02-04 DIAGNOSIS — R13.10 DYSPHAGIA, UNSPECIFIED: ICD-10-CM

## 2022-02-04 DIAGNOSIS — R68.89 OTHER GENERAL SYMPTOMS AND SIGNS: ICD-10-CM

## 2022-02-04 LAB
ALBUMIN SERPL ELPH-MCNC: 3.4 G/DL — SIGNIFICANT CHANGE UP (ref 3.3–5)
ALP SERPL-CCNC: 85 U/L — SIGNIFICANT CHANGE UP (ref 40–120)
ALT FLD-CCNC: 17 U/L — SIGNIFICANT CHANGE UP (ref 10–45)
ANION GAP SERPL CALC-SCNC: 7 MMOL/L — SIGNIFICANT CHANGE UP (ref 5–17)
AST SERPL-CCNC: 23 U/L — SIGNIFICANT CHANGE UP (ref 10–40)
BASE EXCESS BLDV CALC-SCNC: 8.7 MMOL/L — HIGH (ref -2–2)
BASOPHILS # BLD AUTO: 0 K/UL — SIGNIFICANT CHANGE UP (ref 0–0.2)
BASOPHILS NFR BLD AUTO: 0 % — SIGNIFICANT CHANGE UP (ref 0–2)
BILIRUB SERPL-MCNC: 0.3 MG/DL — SIGNIFICANT CHANGE UP (ref 0.2–1.2)
BUN SERPL-MCNC: 20 MG/DL — SIGNIFICANT CHANGE UP (ref 7–23)
CA-I SERPL-SCNC: 1.23 MMOL/L — SIGNIFICANT CHANGE UP (ref 1.15–1.33)
CALCIUM SERPL-MCNC: 9.1 MG/DL — SIGNIFICANT CHANGE UP (ref 8.4–10.5)
CHLORIDE BLDV-SCNC: 90 MMOL/L — LOW (ref 96–108)
CHLORIDE SERPL-SCNC: 88 MMOL/L — LOW (ref 96–108)
CO2 BLDV-SCNC: 39 MMOL/L — HIGH (ref 22–26)
CO2 SERPL-SCNC: 32 MMOL/L — HIGH (ref 22–31)
CREAT SERPL-MCNC: 0.58 MG/DL — SIGNIFICANT CHANGE UP (ref 0.5–1.3)
EOSINOPHIL # BLD AUTO: 0.28 K/UL — SIGNIFICANT CHANGE UP (ref 0–0.5)
EOSINOPHIL NFR BLD AUTO: 3.4 % — SIGNIFICANT CHANGE UP (ref 0–6)
GAS PNL BLDV: 126 MMOL/L — LOW (ref 136–145)
GAS PNL BLDV: SIGNIFICANT CHANGE UP
GAS PNL BLDV: SIGNIFICANT CHANGE UP
GLUCOSE BLDV-MCNC: 83 MG/DL — SIGNIFICANT CHANGE UP (ref 70–99)
GLUCOSE SERPL-MCNC: 86 MG/DL — SIGNIFICANT CHANGE UP (ref 70–99)
HCO3 BLDV-SCNC: 37 MMOL/L — HIGH (ref 22–29)
HCT VFR BLD CALC: 33.9 % — LOW (ref 34.5–45)
HCT VFR BLDA CALC: 35 % — SIGNIFICANT CHANGE UP (ref 34.5–46.5)
HGB BLD CALC-MCNC: 11.6 G/DL — LOW (ref 11.7–16.1)
HGB BLD-MCNC: 11.5 G/DL — SIGNIFICANT CHANGE UP (ref 11.5–15.5)
LACTATE BLDV-MCNC: 0.7 MMOL/L — SIGNIFICANT CHANGE UP (ref 0.7–2)
LYMPHOCYTES # BLD AUTO: 1.15 K/UL — SIGNIFICANT CHANGE UP (ref 1–3.3)
LYMPHOCYTES # BLD AUTO: 13.8 % — SIGNIFICANT CHANGE UP (ref 13–44)
MANUAL SMEAR VERIFICATION: SIGNIFICANT CHANGE UP
MCHC RBC-ENTMCNC: 33.9 GM/DL — SIGNIFICANT CHANGE UP (ref 32–36)
MCHC RBC-ENTMCNC: 35.1 PG — HIGH (ref 27–34)
MCV RBC AUTO: 103.4 FL — HIGH (ref 80–100)
MONOCYTES # BLD AUTO: 1.07 K/UL — HIGH (ref 0–0.9)
MONOCYTES NFR BLD AUTO: 12.9 % — SIGNIFICANT CHANGE UP (ref 2–14)
MYELOCYTES NFR BLD: 0.9 % — HIGH (ref 0–0)
NEUTROPHILS # BLD AUTO: 5.66 K/UL — SIGNIFICANT CHANGE UP (ref 1.8–7.4)
NEUTROPHILS NFR BLD AUTO: 66.4 % — SIGNIFICANT CHANGE UP (ref 43–77)
NEUTS BAND # BLD: 1.7 % — SIGNIFICANT CHANGE UP (ref 0–8)
PCO2 BLDV: 70 MMHG — HIGH (ref 39–42)
PH BLDV: 7.33 — SIGNIFICANT CHANGE UP (ref 7.32–7.43)
PLAT MORPH BLD: NORMAL — SIGNIFICANT CHANGE UP
PLATELET # BLD AUTO: 143 K/UL — LOW (ref 150–400)
PO2 BLDV: 57 MMHG — HIGH (ref 25–45)
POTASSIUM BLDV-SCNC: 4.3 MMOL/L — SIGNIFICANT CHANGE UP (ref 3.5–5.1)
POTASSIUM SERPL-MCNC: 4.2 MMOL/L — SIGNIFICANT CHANGE UP (ref 3.5–5.3)
POTASSIUM SERPL-SCNC: 4.2 MMOL/L — SIGNIFICANT CHANGE UP (ref 3.5–5.3)
PROT SERPL-MCNC: 7 G/DL — SIGNIFICANT CHANGE UP (ref 6–8.3)
RAPID RVP RESULT: SIGNIFICANT CHANGE UP
RBC # BLD: 3.28 M/UL — LOW (ref 3.8–5.2)
RBC # FLD: 14.8 % — HIGH (ref 10.3–14.5)
RBC BLD AUTO: SIGNIFICANT CHANGE UP
SAO2 % BLDV: 88.2 % — HIGH (ref 67–88)
SARS-COV-2 RNA SPEC QL NAA+PROBE: SIGNIFICANT CHANGE UP
SODIUM SERPL-SCNC: 127 MMOL/L — LOW (ref 135–145)
VARIANT LYMPHS # BLD: 0.9 % — SIGNIFICANT CHANGE UP (ref 0–6)
WBC # BLD: 8.31 K/UL — SIGNIFICANT CHANGE UP (ref 3.8–10.5)
WBC # FLD AUTO: 8.31 K/UL — SIGNIFICANT CHANGE UP (ref 3.8–10.5)

## 2022-02-04 PROCEDURE — 99223 1ST HOSP IP/OBS HIGH 75: CPT

## 2022-02-04 PROCEDURE — 76937 US GUIDE VASCULAR ACCESS: CPT | Mod: 26

## 2022-02-04 RX ORDER — DEXTROSE 50 % IN WATER 50 %
12.5 SYRINGE (ML) INTRAVENOUS ONCE
Refills: 0 | Status: DISCONTINUED | OUTPATIENT
Start: 2022-02-04 | End: 2022-02-08

## 2022-02-04 RX ORDER — LACOSAMIDE 50 MG/1
200 TABLET ORAL
Refills: 0 | Status: DISCONTINUED | OUTPATIENT
Start: 2022-02-04 | End: 2022-02-10

## 2022-02-04 RX ORDER — ACETAMINOPHEN 500 MG
650 TABLET ORAL EVERY 6 HOURS
Refills: 0 | Status: DISCONTINUED | OUTPATIENT
Start: 2022-02-04 | End: 2022-02-10

## 2022-02-04 RX ORDER — ROBINUL 0.2 MG/ML
0.2 INJECTION INTRAMUSCULAR; INTRAVENOUS ONCE
Refills: 0 | Status: COMPLETED | OUTPATIENT
Start: 2022-02-04 | End: 2022-02-04

## 2022-02-04 RX ORDER — LEVETIRACETAM 250 MG/1
600 TABLET, FILM COATED ORAL
Refills: 0 | Status: DISCONTINUED | OUTPATIENT
Start: 2022-02-04 | End: 2022-02-10

## 2022-02-04 RX ORDER — MAGNESIUM HYDROXIDE 400 MG/1
30 TABLET, CHEWABLE ORAL DAILY
Refills: 0 | Status: DISCONTINUED | OUTPATIENT
Start: 2022-02-04 | End: 2022-02-10

## 2022-02-04 RX ORDER — FERROUS SULFATE 325(65) MG
300 TABLET ORAL DAILY
Refills: 0 | Status: DISCONTINUED | OUTPATIENT
Start: 2022-02-04 | End: 2022-02-10

## 2022-02-04 RX ORDER — VALPROIC ACID (AS SODIUM SALT) 250 MG/5ML
750 SOLUTION, ORAL ORAL THREE TIMES A DAY
Refills: 0 | Status: DISCONTINUED | OUTPATIENT
Start: 2022-02-04 | End: 2022-02-10

## 2022-02-04 RX ORDER — SALICYLIC ACID 0.5 %
1 CLEANSER (GRAM) TOPICAL DAILY
Refills: 0 | Status: DISCONTINUED | OUTPATIENT
Start: 2022-02-04 | End: 2022-02-10

## 2022-02-04 RX ORDER — SENNA PLUS 8.6 MG/1
2 TABLET ORAL AT BEDTIME
Refills: 0 | Status: DISCONTINUED | OUTPATIENT
Start: 2022-02-04 | End: 2022-02-10

## 2022-02-04 RX ORDER — MULTIVIT-MIN/FERROUS GLUCONATE 9 MG/15 ML
1 LIQUID (ML) ORAL DAILY
Refills: 0 | Status: DISCONTINUED | OUTPATIENT
Start: 2022-02-04 | End: 2022-02-10

## 2022-02-04 RX ORDER — DEXTROSE 50 % IN WATER 50 %
15 SYRINGE (ML) INTRAVENOUS ONCE
Refills: 0 | Status: DISCONTINUED | OUTPATIENT
Start: 2022-02-04 | End: 2022-02-08

## 2022-02-04 RX ORDER — BUDESONIDE, MICRONIZED 100 %
0.5 POWDER (GRAM) MISCELLANEOUS
Refills: 0 | Status: DISCONTINUED | OUTPATIENT
Start: 2022-02-04 | End: 2022-02-10

## 2022-02-04 RX ORDER — DEXTROSE 50 % IN WATER 50 %
25 SYRINGE (ML) INTRAVENOUS ONCE
Refills: 0 | Status: DISCONTINUED | OUTPATIENT
Start: 2022-02-04 | End: 2022-02-08

## 2022-02-04 RX ORDER — CALCIUM CARBONATE 500(1250)
1 TABLET ORAL DAILY
Refills: 0 | Status: DISCONTINUED | OUTPATIENT
Start: 2022-02-04 | End: 2022-02-10

## 2022-02-04 RX ORDER — GLUCAGON INJECTION, SOLUTION 0.5 MG/.1ML
1 INJECTION, SOLUTION SUBCUTANEOUS ONCE
Refills: 0 | Status: DISCONTINUED | OUTPATIENT
Start: 2022-02-04 | End: 2022-02-10

## 2022-02-04 RX ORDER — SODIUM CHLORIDE 9 MG/ML
1000 INJECTION, SOLUTION INTRAVENOUS
Refills: 0 | Status: DISCONTINUED | OUTPATIENT
Start: 2022-02-04 | End: 2022-02-08

## 2022-02-04 RX ORDER — IPRATROPIUM/ALBUTEROL SULFATE 18-103MCG
3 AEROSOL WITH ADAPTER (GRAM) INHALATION EVERY 4 HOURS
Refills: 0 | Status: DISCONTINUED | OUTPATIENT
Start: 2022-02-04 | End: 2022-02-10

## 2022-02-04 RX ORDER — LANOLIN/MINERAL OIL
1 LOTION (ML) TOPICAL
Refills: 0 | Status: DISCONTINUED | OUTPATIENT
Start: 2022-02-04 | End: 2022-02-04

## 2022-02-04 RX ORDER — KETAMINE HYDROCHLORIDE 100 MG/ML
100 INJECTION INTRAMUSCULAR; INTRAVENOUS ONCE
Refills: 0 | Status: DISCONTINUED | OUTPATIENT
Start: 2022-02-04 | End: 2022-02-04

## 2022-02-04 RX ORDER — ENOXAPARIN SODIUM 100 MG/ML
40 INJECTION SUBCUTANEOUS EVERY 12 HOURS
Refills: 0 | Status: DISCONTINUED | OUTPATIENT
Start: 2022-02-04 | End: 2022-02-10

## 2022-02-04 RX ORDER — SODIUM CHLORIDE 9 MG/ML
4 INJECTION INTRAMUSCULAR; INTRAVENOUS; SUBCUTANEOUS EVERY 12 HOURS
Refills: 0 | Status: DISCONTINUED | OUTPATIENT
Start: 2022-02-04 | End: 2022-02-10

## 2022-02-04 RX ADMIN — Medication 750 MILLIGRAM(S): at 23:03

## 2022-02-04 RX ADMIN — LACOSAMIDE 200 MILLIGRAM(S): 50 TABLET ORAL at 16:41

## 2022-02-04 RX ADMIN — Medication 0.5 MILLIGRAM(S): at 20:09

## 2022-02-04 RX ADMIN — LEVETIRACETAM 600 MILLIGRAM(S): 250 TABLET, FILM COATED ORAL at 20:40

## 2022-02-04 RX ADMIN — Medication 300 MILLIGRAM(S): at 14:16

## 2022-02-04 RX ADMIN — LACOSAMIDE 200 MILLIGRAM(S): 50 TABLET ORAL at 23:05

## 2022-02-04 RX ADMIN — SENNA PLUS 2 TABLET(S): 8.6 TABLET ORAL at 23:05

## 2022-02-04 RX ADMIN — Medication 3 MILLILITER(S): at 20:09

## 2022-02-04 RX ADMIN — ENOXAPARIN SODIUM 40 MILLIGRAM(S): 100 INJECTION SUBCUTANEOUS at 20:03

## 2022-02-04 RX ADMIN — Medication 1 TABLET(S): at 14:16

## 2022-02-04 RX ADMIN — Medication 3 MILLILITER(S): at 12:48

## 2022-02-04 RX ADMIN — Medication 1 APPLICATION(S): at 16:41

## 2022-02-04 RX ADMIN — SODIUM CHLORIDE 4 MILLILITER(S): 9 INJECTION INTRAMUSCULAR; INTRAVENOUS; SUBCUTANEOUS at 23:02

## 2022-02-04 RX ADMIN — ROBINUL 0.2 MILLIGRAM(S): 0.2 INJECTION INTRAMUSCULAR; INTRAVENOUS at 01:37

## 2022-02-04 RX ADMIN — KETAMINE HYDROCHLORIDE 100 MILLIGRAM(S): 100 INJECTION INTRAMUSCULAR; INTRAVENOUS at 00:29

## 2022-02-04 RX ADMIN — Medication 750 MILLIGRAM(S): at 14:17

## 2022-02-04 RX ADMIN — Medication 1 TABLET(S): at 14:15

## 2022-02-04 NOTE — H&P ADULT - PROBLEM SELECTOR PLAN 3
-restart tube feeds- Jevity @ 55ml/hr x24h per last nutrition recommendation  -nutrition consult  -per nurse at [facility] patient used to take food by mouth but has not in a long time. She requested SLP consult however patient doesn't follow instructions, this will likely limit the exam.  -review of last SLP note in 2019 - patient failed swallow eval due to inability to follow commands, signs of severe dysphagia, and secretions w/need for suction

## 2022-02-04 NOTE — ED PROCEDURE NOTE - PROCEDURE ADDITIONAL DETAILS
POCUS: Emergency Department Focused Ultrasound performed at patient's bedside.  The complete report may be available in PACS. POCUS: Emergency Department Focused Ultrasound performed at patient's bedside.  The complete report may be available in PACS.  18 gauge  left forearm

## 2022-02-04 NOTE — H&P ADULT - HISTORY OF PRESENT ILLNESS
56F w/pmh cerebral palsy (non-verbal, moans, bedbound at baseline), profound intellectual disability, seizure d/o, constipation, multiple admissions for aspiration PNA, (Pseudomonas & MRSA in sputum) requiring intubation, dysphagia s/p PEG recent admission Jan 19 to Feb 1 for cough, hemoptysis (deemed likely a component of chronic aspiration of secretions), discharged for 2 days, returns back to [facility] for cc oral secretions requiring suctioning that is not available at her residential. Spoke with nurse (Stephie [phone]) from the facility, no diarrhea/constipation, no fevers or chills. No other issues besides the oral secretions. No hemoptysis this time. She usually gets tube feeds during the night time only, and was tolerating them without issues.     In the ER, patient was hypoxic to 89% and was placed on 2L NC.

## 2022-02-04 NOTE — H&P ADULT - PROBLEM SELECTOR PLAN 1
-ordered for suctioning q4 and PRN  -per nurse at [facility] they do not have the staff for suctioning, patient may need to go somewhere else for suctioning needs  -last admission was given albuterol and ipratropium nebs q4-6h and 3% hypertonic saline 4mL 2-3 times/day; unclear if this was continued at [facility] since these meds are not listed on her last discharge note. Resume now. -ordered for suctioning q4 and PRN  -per nurse at [facility] they do not have the staff for suctioning, patient may need to go somewhere else for suctioning needs  -last admission was given albuterol and ipratropium nebs q4 and 3% hypertonic saline 4mL BID; unclear if this was continued at [facility] since these meds are not listed on her last discharge note or her meds list provided by facility. Resume now.

## 2022-02-04 NOTE — PATIENT PROFILE ADULT - FALL HARM RISK - HARM RISK INTERVENTIONS

## 2022-02-04 NOTE — H&P ADULT - PROBLEM SELECTOR PLAN 5
following MICKEY regimen:  -milk of magnesia 30ml via peg followed by 100ml water on day 2 of no BM  -senna 2 tabs qhs  -fleet enema prn on day 3 of no BM

## 2022-02-04 NOTE — H&P ADULT - NSHPPHYSICALEXAM_GEN_ALL_CORE
Vital Signs Last 24 Hrs  T(C): 36.3 (04 Feb 2022 08:45), Max: 36.3 (04 Feb 2022 08:45)  T(F): 97.4 (04 Feb 2022 08:45), Max: 97.4 (04 Feb 2022 08:45)  HR: 100 (04 Feb 2022 08:45) (79 - 102)  BP: 126/82 (04 Feb 2022 08:45) (99/64 - 153/83)  BP(mean): 75 (04 Feb 2022 00:44) (75 - 75)  RR: 18 (04 Feb 2022 08:45) (18 - 20)  SpO2: 99% (04 Feb 2022 08:45) (96% - 100%)    CONSTITUTIONAL: Well-groomed, in no apparent distress, obese  EYES: No conjunctival or scleral injection, non-icteric; PERRLA and symmetric  ENMT: No external nasal lesions; no pharyngeal injection or exudates, oral mucosa with moist membranes, no drooling, no pooling of secretions in mouth  NECK: Trachea midline without palpable neck mass; thyroid not enlarged and non-tender  RESPIRATORY: Breathing comfortably; lungs CTA anteriorly  CARDIOVASCULAR: +S1S2, RRR, no M/G/R; pedal pulses full and symmetric; no lower extremity edema  GASTROINTESTINAL: No palpable masses or tenderness, +BS throughout, no rebound/guarding; no hepatosplenomegaly; +PEG tube in place  MUSCULOSKELETAL: no digital clubbing or cyanosis; no paraspinal tenderness; b/l LE muscle wasting  NEUROLOGIC: tracks with eyes appropriately, does not follow commands, does not speak  PSYCHIATRIC: awake and alert, appears comfortable but moans when examined

## 2022-02-04 NOTE — PATIENT PROFILE ADULT - HAS THE PATIENT RECEIVED THE INFLUENZA VACCINE THIS SEASON?
[facility]                                                               Office:  [address] [address]                                                              Telephone: [phone]. Fax:[phone]                                                                CARDIO-ONCOLOGY CONSULTATION NOTE                                                                                          HPI:  82M h/o former chronic smoker (quit >20 yrs ago), follows with outside cardiologist (Dr. Ellington in [place], on ASA 81mg for primary prevention), HTN, HLD, prostate cancer (s/p resection/radiation), recently diagnosed metastatic cancer suspected of gallbladder origin (initially dx as mets to spine s/p resection 2020, chest, liver, adrenal) follows with [facility] in [place] (Dr. Musa) was planning to start chemotherapy yesterday but then noted to be hyponatremic, first chemo course deferred and given IVF hydration, stopped HCTZ, then returned home around 5pm yesterday while in was standing in the kitchen noted weakness in his legs "just gave out" then fell backward hit his head, reportedly was awake prior the fall, then loss consciousness after he hit the ground, wife found him and went to [facility] found with R-interventricular hemorrhage, transferred here overnight for NSICU.     Patient reports last seen his outside cardiologist 2 years ago as routine check up, reportedly had normal stress test then, been taking ASA 81mg for primary prevention, no prior syncope or fall in the past. His son that I spoke with on the phone reports he's been having unsteady gait recently, since 2020 with the metastatic cancer diagnosis and surgery been less functional, also bother by back pain, only using acetaminophen at home, recently also with decreased oral intake, diarrhea and urinary issues. He denies any chest discomfort, palpitation or dizziness prior the fall, no prior abnormal heart rhythm.       Prior cancer treatment:   Chemo/targeted agents:  Radiation:  Surgery:       REVIEW OF SYMPTOMS:     CONSTITUTIONAL: No fever, weight loss, + fatigue  ENMT:  No difficulty hearing, tinnitus, vertigo; No sinus or throat pain  NECK: No pain or stiffness  CARDIOVASCULAR: as per HPI  RESPIRATORY: No Dyspnea on exertion, Shortness of breath, cough, wheezing  : No dysuria, no hematuria   GI: No dark color stool, no melena, no diarrhea, no constipation, no abdominal pain   NEURO: No headache, no dizziness, no slurred speech   MUSCULOSKELETAL: No joint pain or swelling; No muscle, back, or extremity pain  PSYCH: No agitation, no anxiety.    ALL OTHER REVIEW OF SYSTEMS ARE NEGATIVE.      PREVIOUS DIAGNOSTIC TESTING    ECHO FINDINGS:  pending        STRESS FINDINGS:  prior outside 2 years ago    CATHETERIZATION FINDINGS:   None      ALLERGIES: Allergies    No Known Allergies        PAST MEDICAL HISTORY  see HPI      PAST SURGICAL HISTORY  Prostate  Spinal tumor resection/fusion      FAMILY HISTORY:  NO CAD      SOCIAL HISTORY:    CIGARETTES:   former  ALCOHOL: denies  DRUGS: denies      CURRENT MEDICATIONS  MEDICATIONS  (STANDING):  chlorhexidine 4% Liquid 1 Application(s) Topical <User Schedule>  sodium chloride 3%. 500 milliLiter(s) (30 mL/Hr) IV Continuous <Continuous>  sodium chloride 3%. 500 milliLiter(s) (30 mL/Hr) IV Continuous <Continuous>    MEDICATIONS  (PRN):      HOME MEDICATIONS:      Vital Signs Last 24 Hrs  T(C): 36.4 (21 @ 07:27), Max: 37.1 (21 @ 00:45)  T(F): 97.6 (21 @ 07:27), Max: 98.8 (21 @ 00:45)  HR: 68 (21 @ 09:00) (68 - 84)  BP: 125/55 (21 @ 09:00) (107/55 - 149/67)  BP(mean): 74 (21 @ 09:00) (70 - 97)  RR: 31 (21 @ 09:00) (14 - 31)  SpO2: 92% (21 @ 09:00) (91% - 99%)  I&O's Summary    2021 07:  -  2021 07:00  --------------------------------------------------------  IN: 490 mL / OUT: 225 mL / NET: 265 mL    2021 07:  -  2021 09:57  --------------------------------------------------------  IN: 60 mL / OUT: 400 mL / NET: -340 mL        Appearance: Comfortable. No acute distress  HEENT:  Head and neck: Atraumatic. Normocephalic.  Normal oral mucosa, PERRL, Neck is supple. No JVD, No carotid bruit.   Neurologic: A&Ox 3, no focal deficits. EOMI, Cranial nerves are intact.  Lymphatic: No cervical lymphadenopathy  Cardiovascular: Normal S1 S2, RRR, No murmur, rubs/gallops. No JVD, No edema, R-chest Mediport  Respiratory: Lungs clear to auscultation  Gastrointestinal:  Soft, Non-tender, + BS  Lower Extremities: No edema  Psychiatry: Patient is calm. No agitation. Mood & affect appropriate  Skin: No rashes/ecchymoses/cyanosis/ulcers visualized on the face, hands or feet.    Labs:                          7.1    2.04  )-----------( 111      ( 2021 04:28 )             20.4         125<L>  |  91<L>  |  16.0  ----------------------------<  91  3.7   |  24.0  |  1.00    Ca    8.0<L>      2021 07:16  Phos  2.7       Mg     1.2         TPro  5.7<L>  /  Alb  3.2<L>  /  TBili  0.9  /  DBili  x   /  AST  42<H>  /  ALT  19  /  AlkPhos  134<H>        TELEMETRY: sinus 60-70s, no ectopy  EC21- Sinus 69, normal axis, QTc 450    c< from: CT Head No Cont (21 @ 04:49) >  IMPRESSION:  Small focus of hemorrhage in the right lateral ventricle is unchanged. No new or enlarging hemorrhage.    1.3 cm nodule in the left orbit is again seen, may represent a small hemangioma; may be further evaluated with MRI.    < end of copied text >   yes...

## 2022-02-04 NOTE — H&P ADULT - ASSESSMENT
56F w/pmh cerebral palsy (non-verbal, moans, bedbound at baseline), profound intellectual disability, seizure d/o, constipation, multiple admissions for aspiration PNA, (Pseudomonas & MRSA in sputum) requiring intubation, dysphagia s/p PEG recent admission Jan 19 to Feb 1 for cough, hemoptysis (deemed likely a component of chronic aspiration of secretions), discharged for 2 days, returns back to [facility] for cc oral secretions requiring suctioning that is not available at her residential.

## 2022-02-04 NOTE — H&P ADULT - PROBLEM SELECTOR PLAN 2
-likely due to components of chronic aspiration and atelectasis (chronic immobility)  -SpO2 on my exam was 90-92% on room air  -CXR shows clear lungs  -continue 2L NC, titrate down/off prn  -chest PT  -no signs of sepsis/pneumonia, hold off on abx  -if worsening SpO2 would get CT chest -likely due to components of chronic aspiration and atelectasis (chronic immobility)  -SpO2 on my exam was 90-92% on room air  -CXR shows clear lungs  -continue 2L NC, titrate down/off prn  -chest PT  -no signs of sepsis/pneumonia, hold off on abx  -if worsening SpO2 would get CT chest  -cont budesonide

## 2022-02-05 LAB
A1C WITH ESTIMATED AVERAGE GLUCOSE RESULT: 5.1 % — SIGNIFICANT CHANGE UP (ref 4–5.6)
ANION GAP SERPL CALC-SCNC: 15 MMOL/L — SIGNIFICANT CHANGE UP (ref 5–17)
BUN SERPL-MCNC: 18 MG/DL — SIGNIFICANT CHANGE UP (ref 7–23)
CALCIUM SERPL-MCNC: 9.3 MG/DL — SIGNIFICANT CHANGE UP (ref 8.4–10.5)
CHLORIDE SERPL-SCNC: 91 MMOL/L — LOW (ref 96–108)
CO2 SERPL-SCNC: 30 MMOL/L — SIGNIFICANT CHANGE UP (ref 22–31)
CREAT SERPL-MCNC: 0.5 MG/DL — SIGNIFICANT CHANGE UP (ref 0.5–1.3)
ESTIMATED AVERAGE GLUCOSE: 100 MG/DL — SIGNIFICANT CHANGE UP (ref 68–114)
GLUCOSE SERPL-MCNC: 97 MG/DL — SIGNIFICANT CHANGE UP (ref 70–99)
HCT VFR BLD CALC: 37.1 % — SIGNIFICANT CHANGE UP (ref 34.5–45)
HGB BLD-MCNC: 11.9 G/DL — SIGNIFICANT CHANGE UP (ref 11.5–15.5)
MAGNESIUM SERPL-MCNC: 2 MG/DL — SIGNIFICANT CHANGE UP (ref 1.6–2.6)
MCHC RBC-ENTMCNC: 32.1 GM/DL — SIGNIFICANT CHANGE UP (ref 32–36)
MCHC RBC-ENTMCNC: 34.3 PG — HIGH (ref 27–34)
MCV RBC AUTO: 106.9 FL — HIGH (ref 80–100)
NRBC # BLD: 0 /100 WBCS — SIGNIFICANT CHANGE UP (ref 0–0)
PHOSPHATE SERPL-MCNC: 4.6 MG/DL — HIGH (ref 2.5–4.5)
PLATELET # BLD AUTO: 128 K/UL — LOW (ref 150–400)
POTASSIUM SERPL-MCNC: 4.4 MMOL/L — SIGNIFICANT CHANGE UP (ref 3.5–5.3)
POTASSIUM SERPL-SCNC: 4.4 MMOL/L — SIGNIFICANT CHANGE UP (ref 3.5–5.3)
RBC # BLD: 3.47 M/UL — LOW (ref 3.8–5.2)
RBC # FLD: 14.9 % — HIGH (ref 10.3–14.5)
SODIUM SERPL-SCNC: 134 MMOL/L — LOW (ref 135–145)
WBC # BLD: 6.91 K/UL — SIGNIFICANT CHANGE UP (ref 3.8–10.5)
WBC # FLD AUTO: 6.91 K/UL — SIGNIFICANT CHANGE UP (ref 3.8–10.5)

## 2022-02-05 PROCEDURE — 99232 SBSQ HOSP IP/OBS MODERATE 35: CPT

## 2022-02-05 RX ADMIN — Medication 3 MILLILITER(S): at 16:35

## 2022-02-05 RX ADMIN — LEVETIRACETAM 600 MILLIGRAM(S): 250 TABLET, FILM COATED ORAL at 05:16

## 2022-02-05 RX ADMIN — Medication 300 MILLIGRAM(S): at 16:13

## 2022-02-05 RX ADMIN — Medication 1 TABLET(S): at 16:12

## 2022-02-05 RX ADMIN — Medication 3 MILLILITER(S): at 08:45

## 2022-02-05 RX ADMIN — LACOSAMIDE 200 MILLIGRAM(S): 50 TABLET ORAL at 21:20

## 2022-02-05 RX ADMIN — LEVETIRACETAM 600 MILLIGRAM(S): 250 TABLET, FILM COATED ORAL at 20:06

## 2022-02-05 RX ADMIN — SODIUM CHLORIDE 4 MILLILITER(S): 9 INJECTION INTRAMUSCULAR; INTRAVENOUS; SUBCUTANEOUS at 08:40

## 2022-02-05 RX ADMIN — LACOSAMIDE 200 MILLIGRAM(S): 50 TABLET ORAL at 09:09

## 2022-02-05 RX ADMIN — Medication 3 MILLILITER(S): at 12:30

## 2022-02-05 RX ADMIN — SODIUM CHLORIDE 4 MILLILITER(S): 9 INJECTION INTRAMUSCULAR; INTRAVENOUS; SUBCUTANEOUS at 20:24

## 2022-02-05 RX ADMIN — Medication 750 MILLIGRAM(S): at 05:11

## 2022-02-05 RX ADMIN — Medication 1 APPLICATION(S): at 16:13

## 2022-02-05 RX ADMIN — Medication 3 MILLILITER(S): at 05:02

## 2022-02-05 RX ADMIN — Medication 750 MILLIGRAM(S): at 16:12

## 2022-02-05 RX ADMIN — SENNA PLUS 2 TABLET(S): 8.6 TABLET ORAL at 21:20

## 2022-02-05 RX ADMIN — Medication 0.5 MILLIGRAM(S): at 05:18

## 2022-02-05 RX ADMIN — ENOXAPARIN SODIUM 40 MILLIGRAM(S): 100 INJECTION SUBCUTANEOUS at 18:54

## 2022-02-05 RX ADMIN — Medication 750 MILLIGRAM(S): at 21:20

## 2022-02-05 RX ADMIN — LACOSAMIDE 200 MILLIGRAM(S): 50 TABLET ORAL at 16:17

## 2022-02-05 RX ADMIN — ENOXAPARIN SODIUM 40 MILLIGRAM(S): 100 INJECTION SUBCUTANEOUS at 06:48

## 2022-02-05 RX ADMIN — Medication 0.5 MILLIGRAM(S): at 18:54

## 2022-02-05 RX ADMIN — Medication 3 MILLILITER(S): at 20:25

## 2022-02-05 NOTE — DIETITIAN INITIAL EVALUATION ADULT. - REASON INDICATOR FOR ASSESSMENT
Consult received for pressure injury stage II or greater.   Information obtained from transfer paperwork, EMR.

## 2022-02-05 NOTE — DIETITIAN INITIAL EVALUATION ADULT. - ENTERAL
Continue feeds of Jevity 1.2 at 55ml/hr x 24 hr to provide 1320ml total volume, 1584 kcal, 73 gm protein, and 1065 ml free water. Meets 31.8kcal/kg, 1.46g/kg protein based on upper end IBW 49.8kg. Defer additional free water flushes to team.

## 2022-02-05 NOTE — DIETITIAN INITIAL EVALUATION ADULT. - CHIEF COMPLAINT
The patient is a 56y Female complaining of  The patient is a 56F w/pmh cerebral palsy (non-verbal, moans, bedbound at baseline), profound intellectual disability, seizure d/o, constipation, multiple admissions for aspiration PNA, (Pseudomonas & MRSA in sputum) requiring intubation, dysphagia s/p PEG recent admission Jan 19 to Feb 1 for cough, hemoptysis (deemed likely a component of chronic aspiration of secretions), discharged for 2 days, returns back to [facility] for cc oral secretions requiring suctioning that is not available at her halfway.

## 2022-02-05 NOTE — DIETITIAN INITIAL EVALUATION ADULT. - ORAL INTAKE PTA/DIET HISTORY
TF regimen at SNF per paperwork: overnight feeds of Jevity 1.5 at 79ml/hr x 12hr to provide 4 cans total (948ml total volume) + ProStat x 1 daily - would provide 1522kcal, 75g protein, 720ml free water. Per chart, pt receiving calcium carbonate and ferrous sulfate PTA. NKFA.

## 2022-02-05 NOTE — DIETITIAN INITIAL EVALUATION ADULT. - PHYSCIAL ASSESSMENT
Per RN assessment: pt with suspected DTI to right heel, stage I left heel  Nutrition focused physical exam deferred at this time as pt receiving breathing treatment.

## 2022-02-05 NOTE — DIETITIAN INITIAL EVALUATION ADULT. - PROBLEM SELECTOR PLAN 2
-likely due to components of chronic aspiration and atelectasis (chronic immobility)  -SpO2 on my exam was 90-92% on room air  -CXR shows clear lungs  -continue 2L NC, titrate down/off prn  -chest PT  -no signs of sepsis/pneumonia, hold off on abx  -if worsening SpO2 would get CT chest  -cont budesonide

## 2022-02-05 NOTE — DIETITIAN INITIAL EVALUATION ADULT. - PERTINENT LABORATORY DATA
02-05 @ 10:29: Na --, BUN 18, Cr 0.50, BG 97, K+ --, Phos 4.6<H>, Mg 2.0, Alk Phos --, ALT/SGPT --, AST/SGOT --, HbA1c --

## 2022-02-05 NOTE — DIETITIAN INITIAL EVALUATION ADULT. - PERTINENT MEDS FT
MEDICATIONS  (STANDING):  albuterol/ipratropium for Nebulization 3 milliLiter(s) Nebulizer every 4 hours  buDESOnide    Inhalation Suspension 0.5 milliGRAM(s) Inhalation two times a day  calcium carbonate    500 mG (Tums) Chewable 1 Tablet(s) Enteral Tube daily  dextrose 40% Gel 15 Gram(s) Oral once  dextrose 5%. 1000 milliLiter(s) (50 mL/Hr) IV Continuous <Continuous>  dextrose 5%. 1000 milliLiter(s) (100 mL/Hr) IV Continuous <Continuous>  dextrose 50% Injectable 25 Gram(s) IV Push once  dextrose 50% Injectable 12.5 Gram(s) IV Push once  dextrose 50% Injectable 25 Gram(s) IV Push once  enoxaparin Injectable 40 milliGRAM(s) SubCutaneous every 12 hours  ferrous    sulfate Liquid 300 milliGRAM(s) Enteral Tube daily  glucagon  Injectable 1 milliGRAM(s) IntraMuscular once  lacosamide Solution 200 milliGRAM(s) Oral <User Schedule>  levETIRAcetam  Solution 600 milliGRAM(s) Oral two times a day  multivitamin/minerals 1 Tablet(s) Oral daily  senna 2 Tablet(s) Oral at bedtime  sodium chloride 3%  Inhalation 4 milliLiter(s) Inhalation every 12 hours  valproic  acid Syrup 750 milliGRAM(s) Enteral Tube three times a day  vitamin A &amp; D Ointment 1 Application(s) Topical daily    MEDICATIONS  (PRN):  acetaminophen    Suspension .. 650 milliGRAM(s) Oral every 6 hours PRN Temp greater or equal to 38C (100.4F), Mild Pain (1 - 3)  magnesium hydroxide Suspension 30 milliLiter(s) Oral daily PRN Constipation  saline laxative (FLEET) Rectal Enema 1 Enema Rectal once PRN constipation, on day 3 of no bowel movement

## 2022-02-05 NOTE — DIETITIAN INITIAL EVALUATION ADULT. - ADD RECOMMEND
Suggest multivitamin and vitamin C to support wound healing as able. Monitor GI tolerance to feeds, RD remains available to adjust TF regimen/formulary as needed/upon request. Continue to monitor nutritional intake, labs, weights, BM, skin, clinical course.

## 2022-02-05 NOTE — DIETITIAN INITIAL EVALUATION ADULT. - PROBLEM SELECTOR PLAN 1
-ordered for suctioning q4 and PRN  -per nurse at [facility] they do not have the staff for suctioning, patient may need to go somewhere else for suctioning needs  -last admission was given albuterol and ipratropium nebs q4 and 3% hypertonic saline 4mL BID; unclear if this was continued at [facility] since these meds are not listed on her last discharge note or her meds list provided by facility. Resume now.

## 2022-02-06 LAB
ANION GAP SERPL CALC-SCNC: 9 MMOL/L — SIGNIFICANT CHANGE UP (ref 5–17)
BUN SERPL-MCNC: 15 MG/DL — SIGNIFICANT CHANGE UP (ref 7–23)
CALCIUM SERPL-MCNC: 8.9 MG/DL — SIGNIFICANT CHANGE UP (ref 8.4–10.5)
CHLORIDE SERPL-SCNC: 96 MMOL/L — SIGNIFICANT CHANGE UP (ref 96–108)
CO2 SERPL-SCNC: 33 MMOL/L — HIGH (ref 22–31)
CREAT SERPL-MCNC: 0.44 MG/DL — LOW (ref 0.5–1.3)
GLUCOSE SERPL-MCNC: 119 MG/DL — HIGH (ref 70–99)
HCT VFR BLD CALC: 34.3 % — LOW (ref 34.5–45)
HGB BLD-MCNC: 10.9 G/DL — LOW (ref 11.5–15.5)
MAGNESIUM SERPL-MCNC: 2 MG/DL — SIGNIFICANT CHANGE UP (ref 1.6–2.6)
MCHC RBC-ENTMCNC: 31.8 GM/DL — LOW (ref 32–36)
MCHC RBC-ENTMCNC: 34.9 PG — HIGH (ref 27–34)
MCV RBC AUTO: 109.9 FL — HIGH (ref 80–100)
NRBC # BLD: 0 /100 WBCS — SIGNIFICANT CHANGE UP (ref 0–0)
PHOSPHATE SERPL-MCNC: 4 MG/DL — SIGNIFICANT CHANGE UP (ref 2.5–4.5)
PLATELET # BLD AUTO: 152 K/UL — SIGNIFICANT CHANGE UP (ref 150–400)
POTASSIUM SERPL-MCNC: 4.5 MMOL/L — SIGNIFICANT CHANGE UP (ref 3.5–5.3)
POTASSIUM SERPL-SCNC: 4.5 MMOL/L — SIGNIFICANT CHANGE UP (ref 3.5–5.3)
RBC # BLD: 3.12 M/UL — LOW (ref 3.8–5.2)
RBC # FLD: 15.4 % — HIGH (ref 10.3–14.5)
SODIUM SERPL-SCNC: 136 MMOL/L — SIGNIFICANT CHANGE UP (ref 135–145)
WBC # BLD: 6.37 K/UL — SIGNIFICANT CHANGE UP (ref 3.8–10.5)
WBC # FLD AUTO: 6.37 K/UL — SIGNIFICANT CHANGE UP (ref 3.8–10.5)

## 2022-02-06 PROCEDURE — 99233 SBSQ HOSP IP/OBS HIGH 50: CPT

## 2022-02-06 RX ADMIN — Medication 3 MILLILITER(S): at 20:51

## 2022-02-06 RX ADMIN — LEVETIRACETAM 600 MILLIGRAM(S): 250 TABLET, FILM COATED ORAL at 20:51

## 2022-02-06 RX ADMIN — Medication 3 MILLILITER(S): at 08:55

## 2022-02-06 RX ADMIN — Medication 0.5 MILLIGRAM(S): at 18:43

## 2022-02-06 RX ADMIN — LACOSAMIDE 200 MILLIGRAM(S): 50 TABLET ORAL at 21:05

## 2022-02-06 RX ADMIN — Medication 3 MILLILITER(S): at 23:57

## 2022-02-06 RX ADMIN — LACOSAMIDE 200 MILLIGRAM(S): 50 TABLET ORAL at 16:35

## 2022-02-06 RX ADMIN — Medication 1 TABLET(S): at 13:12

## 2022-02-06 RX ADMIN — Medication 3 MILLILITER(S): at 04:35

## 2022-02-06 RX ADMIN — Medication 750 MILLIGRAM(S): at 13:12

## 2022-02-06 RX ADMIN — Medication 3 MILLILITER(S): at 01:09

## 2022-02-06 RX ADMIN — Medication 750 MILLIGRAM(S): at 06:18

## 2022-02-06 RX ADMIN — ENOXAPARIN SODIUM 40 MILLIGRAM(S): 100 INJECTION SUBCUTANEOUS at 06:18

## 2022-02-06 RX ADMIN — Medication 300 MILLIGRAM(S): at 13:12

## 2022-02-06 RX ADMIN — LACOSAMIDE 200 MILLIGRAM(S): 50 TABLET ORAL at 06:44

## 2022-02-06 RX ADMIN — LEVETIRACETAM 600 MILLIGRAM(S): 250 TABLET, FILM COATED ORAL at 06:19

## 2022-02-06 RX ADMIN — ENOXAPARIN SODIUM 40 MILLIGRAM(S): 100 INJECTION SUBCUTANEOUS at 18:43

## 2022-02-06 RX ADMIN — Medication 1 TABLET(S): at 13:11

## 2022-02-06 RX ADMIN — Medication 0.5 MILLIGRAM(S): at 06:18

## 2022-02-06 RX ADMIN — SODIUM CHLORIDE 4 MILLILITER(S): 9 INJECTION INTRAMUSCULAR; INTRAVENOUS; SUBCUTANEOUS at 20:51

## 2022-02-06 RX ADMIN — Medication 3 MILLILITER(S): at 16:34

## 2022-02-06 RX ADMIN — Medication 750 MILLIGRAM(S): at 21:05

## 2022-02-06 RX ADMIN — SODIUM CHLORIDE 4 MILLILITER(S): 9 INJECTION INTRAMUSCULAR; INTRAVENOUS; SUBCUTANEOUS at 08:54

## 2022-02-06 RX ADMIN — Medication 1 APPLICATION(S): at 14:24

## 2022-02-06 RX ADMIN — SENNA PLUS 2 TABLET(S): 8.6 TABLET ORAL at 21:05

## 2022-02-06 RX ADMIN — Medication 3 MILLILITER(S): at 12:49

## 2022-02-07 LAB
ANION GAP SERPL CALC-SCNC: 6 MMOL/L — SIGNIFICANT CHANGE UP (ref 5–17)
BUN SERPL-MCNC: 19 MG/DL — SIGNIFICANT CHANGE UP (ref 7–23)
CALCIUM SERPL-MCNC: 9 MG/DL — SIGNIFICANT CHANGE UP (ref 8.4–10.5)
CHLORIDE SERPL-SCNC: 96 MMOL/L — SIGNIFICANT CHANGE UP (ref 96–108)
CO2 SERPL-SCNC: 37 MMOL/L — HIGH (ref 22–31)
CREAT SERPL-MCNC: 0.45 MG/DL — LOW (ref 0.5–1.3)
GLUCOSE BLDC GLUCOMTR-MCNC: 101 MG/DL — HIGH (ref 70–99)
GLUCOSE SERPL-MCNC: 102 MG/DL — HIGH (ref 70–99)
HCT VFR BLD CALC: 35.2 % — SIGNIFICANT CHANGE UP (ref 34.5–45)
HGB BLD-MCNC: 11.3 G/DL — LOW (ref 11.5–15.5)
MCHC RBC-ENTMCNC: 32.1 GM/DL — SIGNIFICANT CHANGE UP (ref 32–36)
MCHC RBC-ENTMCNC: 35.2 PG — HIGH (ref 27–34)
MCV RBC AUTO: 109.7 FL — HIGH (ref 80–100)
NRBC # BLD: 0 /100 WBCS — SIGNIFICANT CHANGE UP (ref 0–0)
PHOSPHATE SERPL-MCNC: 4.2 MG/DL — SIGNIFICANT CHANGE UP (ref 2.5–4.5)
PLATELET # BLD AUTO: 144 K/UL — LOW (ref 150–400)
POTASSIUM SERPL-MCNC: 4.5 MMOL/L — SIGNIFICANT CHANGE UP (ref 3.5–5.3)
POTASSIUM SERPL-SCNC: 4.5 MMOL/L — SIGNIFICANT CHANGE UP (ref 3.5–5.3)
RBC # BLD: 3.21 M/UL — LOW (ref 3.8–5.2)
RBC # FLD: 14.9 % — HIGH (ref 10.3–14.5)
SODIUM SERPL-SCNC: 139 MMOL/L — SIGNIFICANT CHANGE UP (ref 135–145)
WBC # BLD: 6.32 K/UL — SIGNIFICANT CHANGE UP (ref 3.8–10.5)
WBC # FLD AUTO: 6.32 K/UL — SIGNIFICANT CHANGE UP (ref 3.8–10.5)

## 2022-02-07 PROCEDURE — 99232 SBSQ HOSP IP/OBS MODERATE 35: CPT

## 2022-02-07 RX ORDER — SCOPALAMINE 1 MG/3D
1 PATCH, EXTENDED RELEASE TRANSDERMAL
Refills: 0 | Status: DISCONTINUED | OUTPATIENT
Start: 2022-02-07 | End: 2022-02-10

## 2022-02-07 RX ADMIN — SCOPALAMINE 1 PATCH: 1 PATCH, EXTENDED RELEASE TRANSDERMAL at 19:50

## 2022-02-07 RX ADMIN — Medication 750 MILLIGRAM(S): at 21:52

## 2022-02-07 RX ADMIN — LACOSAMIDE 200 MILLIGRAM(S): 50 TABLET ORAL at 18:38

## 2022-02-07 RX ADMIN — Medication 1 TABLET(S): at 14:07

## 2022-02-07 RX ADMIN — Medication 3 MILLILITER(S): at 20:20

## 2022-02-07 RX ADMIN — Medication 3 MILLILITER(S): at 09:52

## 2022-02-07 RX ADMIN — LEVETIRACETAM 600 MILLIGRAM(S): 250 TABLET, FILM COATED ORAL at 18:08

## 2022-02-07 RX ADMIN — ENOXAPARIN SODIUM 40 MILLIGRAM(S): 100 INJECTION SUBCUTANEOUS at 05:58

## 2022-02-07 RX ADMIN — Medication 3 MILLILITER(S): at 14:05

## 2022-02-07 RX ADMIN — SODIUM CHLORIDE 4 MILLILITER(S): 9 INJECTION INTRAMUSCULAR; INTRAVENOUS; SUBCUTANEOUS at 20:21

## 2022-02-07 RX ADMIN — ENOXAPARIN SODIUM 40 MILLIGRAM(S): 100 INJECTION SUBCUTANEOUS at 18:07

## 2022-02-07 RX ADMIN — SENNA PLUS 2 TABLET(S): 8.6 TABLET ORAL at 21:52

## 2022-02-07 RX ADMIN — LACOSAMIDE 200 MILLIGRAM(S): 50 TABLET ORAL at 06:06

## 2022-02-07 RX ADMIN — Medication 0.5 MILLIGRAM(S): at 18:08

## 2022-02-07 RX ADMIN — Medication 300 MILLIGRAM(S): at 14:06

## 2022-02-07 RX ADMIN — Medication 750 MILLIGRAM(S): at 05:58

## 2022-02-07 RX ADMIN — Medication 750 MILLIGRAM(S): at 14:07

## 2022-02-07 RX ADMIN — SCOPALAMINE 1 PATCH: 1 PATCH, EXTENDED RELEASE TRANSDERMAL at 14:05

## 2022-02-07 RX ADMIN — Medication 0.5 MILLIGRAM(S): at 05:57

## 2022-02-07 RX ADMIN — Medication 1 APPLICATION(S): at 14:11

## 2022-02-07 RX ADMIN — SODIUM CHLORIDE 4 MILLILITER(S): 9 INJECTION INTRAMUSCULAR; INTRAVENOUS; SUBCUTANEOUS at 09:52

## 2022-02-07 RX ADMIN — Medication 3 MILLILITER(S): at 04:21

## 2022-02-07 RX ADMIN — LEVETIRACETAM 600 MILLIGRAM(S): 250 TABLET, FILM COATED ORAL at 05:58

## 2022-02-08 LAB — SARS-COV-2 RNA SPEC QL NAA+PROBE: SIGNIFICANT CHANGE UP

## 2022-02-08 PROCEDURE — 99233 SBSQ HOSP IP/OBS HIGH 50: CPT

## 2022-02-08 RX ADMIN — LEVETIRACETAM 600 MILLIGRAM(S): 250 TABLET, FILM COATED ORAL at 05:47

## 2022-02-08 RX ADMIN — SCOPALAMINE 1 PATCH: 1 PATCH, EXTENDED RELEASE TRANSDERMAL at 20:46

## 2022-02-08 RX ADMIN — Medication 3 MILLILITER(S): at 20:54

## 2022-02-08 RX ADMIN — Medication 1 APPLICATION(S): at 14:17

## 2022-02-08 RX ADMIN — ENOXAPARIN SODIUM 40 MILLIGRAM(S): 100 INJECTION SUBCUTANEOUS at 05:49

## 2022-02-08 RX ADMIN — Medication 750 MILLIGRAM(S): at 14:16

## 2022-02-08 RX ADMIN — LEVETIRACETAM 600 MILLIGRAM(S): 250 TABLET, FILM COATED ORAL at 21:04

## 2022-02-08 RX ADMIN — SCOPALAMINE 1 PATCH: 1 PATCH, EXTENDED RELEASE TRANSDERMAL at 08:00

## 2022-02-08 RX ADMIN — LACOSAMIDE 200 MILLIGRAM(S): 50 TABLET ORAL at 21:07

## 2022-02-08 RX ADMIN — Medication 3 MILLILITER(S): at 04:42

## 2022-02-08 RX ADMIN — Medication 3 MILLILITER(S): at 14:16

## 2022-02-08 RX ADMIN — Medication 0.5 MILLIGRAM(S): at 05:47

## 2022-02-08 RX ADMIN — Medication 300 MILLIGRAM(S): at 14:15

## 2022-02-08 RX ADMIN — LACOSAMIDE 200 MILLIGRAM(S): 50 TABLET ORAL at 00:14

## 2022-02-08 RX ADMIN — Medication 750 MILLIGRAM(S): at 21:08

## 2022-02-08 RX ADMIN — ENOXAPARIN SODIUM 40 MILLIGRAM(S): 100 INJECTION SUBCUTANEOUS at 17:41

## 2022-02-08 RX ADMIN — LACOSAMIDE 200 MILLIGRAM(S): 50 TABLET ORAL at 10:05

## 2022-02-08 RX ADMIN — Medication 3 MILLILITER(S): at 00:08

## 2022-02-08 RX ADMIN — Medication 3 MILLILITER(S): at 17:41

## 2022-02-08 RX ADMIN — LACOSAMIDE 200 MILLIGRAM(S): 50 TABLET ORAL at 17:41

## 2022-02-08 RX ADMIN — Medication 1 TABLET(S): at 14:15

## 2022-02-08 RX ADMIN — Medication 0.5 MILLIGRAM(S): at 17:41

## 2022-02-08 RX ADMIN — Medication 750 MILLIGRAM(S): at 05:48

## 2022-02-08 RX ADMIN — SENNA PLUS 2 TABLET(S): 8.6 TABLET ORAL at 21:04

## 2022-02-08 RX ADMIN — SODIUM CHLORIDE 4 MILLILITER(S): 9 INJECTION INTRAMUSCULAR; INTRAVENOUS; SUBCUTANEOUS at 20:55

## 2022-02-08 NOTE — DISCHARGE NOTE PROVIDER - NSFOLLOWUPCLINICS_GEN_ALL_ED_FT
Trazodone 50 mg  Filled 1-7-22  Qty 30  0 refills  No upcoming appt.    LOV 9-16-21 [facility]  Internal Medicine  [id number] [place] [address]  Phone: [phone]  Fax:   Follow Up Time:

## 2022-02-09 ENCOUNTER — TRANSCRIPTION ENCOUNTER (OUTPATIENT)
Age: 57
End: 2022-02-09

## 2022-02-09 LAB
ANION GAP SERPL CALC-SCNC: 10 MMOL/L — SIGNIFICANT CHANGE UP (ref 5–17)
ANION GAP SERPL CALC-SCNC: 9 MMOL/L — SIGNIFICANT CHANGE UP (ref 5–17)
BUN SERPL-MCNC: 23 MG/DL — SIGNIFICANT CHANGE UP (ref 7–23)
BUN SERPL-MCNC: 23 MG/DL — SIGNIFICANT CHANGE UP (ref 7–23)
CALCIUM SERPL-MCNC: 8.9 MG/DL — SIGNIFICANT CHANGE UP (ref 8.4–10.5)
CALCIUM SERPL-MCNC: 9.2 MG/DL — SIGNIFICANT CHANGE UP (ref 8.4–10.5)
CHLORIDE SERPL-SCNC: 100 MMOL/L — SIGNIFICANT CHANGE UP (ref 96–108)
CHLORIDE SERPL-SCNC: 99 MMOL/L — SIGNIFICANT CHANGE UP (ref 96–108)
CO2 SERPL-SCNC: 32 MMOL/L — HIGH (ref 22–31)
CO2 SERPL-SCNC: 34 MMOL/L — HIGH (ref 22–31)
CREAT SERPL-MCNC: 0.42 MG/DL — LOW (ref 0.5–1.3)
CREAT SERPL-MCNC: 0.45 MG/DL — LOW (ref 0.5–1.3)
GLUCOSE SERPL-MCNC: 79 MG/DL — SIGNIFICANT CHANGE UP (ref 70–99)
GLUCOSE SERPL-MCNC: 98 MG/DL — SIGNIFICANT CHANGE UP (ref 70–99)
POTASSIUM SERPL-MCNC: 5.1 MMOL/L — SIGNIFICANT CHANGE UP (ref 3.5–5.3)
POTASSIUM SERPL-MCNC: 6.4 MMOL/L — CRITICAL HIGH (ref 3.5–5.3)
POTASSIUM SERPL-SCNC: 5.1 MMOL/L — SIGNIFICANT CHANGE UP (ref 3.5–5.3)
POTASSIUM SERPL-SCNC: 6.4 MMOL/L — CRITICAL HIGH (ref 3.5–5.3)
SODIUM SERPL-SCNC: 140 MMOL/L — SIGNIFICANT CHANGE UP (ref 135–145)
SODIUM SERPL-SCNC: 144 MMOL/L — SIGNIFICANT CHANGE UP (ref 135–145)

## 2022-02-09 PROCEDURE — 99232 SBSQ HOSP IP/OBS MODERATE 35: CPT

## 2022-02-09 RX ORDER — SODIUM ZIRCONIUM CYCLOSILICATE 10 G/10G
10 POWDER, FOR SUSPENSION ORAL ONCE
Refills: 0 | Status: DISCONTINUED | OUTPATIENT
Start: 2022-02-09 | End: 2022-02-09

## 2022-02-09 RX ADMIN — Medication 1 APPLICATION(S): at 14:35

## 2022-02-09 RX ADMIN — ENOXAPARIN SODIUM 40 MILLIGRAM(S): 100 INJECTION SUBCUTANEOUS at 18:53

## 2022-02-09 RX ADMIN — Medication 750 MILLIGRAM(S): at 21:24

## 2022-02-09 RX ADMIN — LACOSAMIDE 200 MILLIGRAM(S): 50 TABLET ORAL at 05:58

## 2022-02-09 RX ADMIN — LEVETIRACETAM 600 MILLIGRAM(S): 250 TABLET, FILM COATED ORAL at 18:53

## 2022-02-09 RX ADMIN — Medication 3 MILLILITER(S): at 08:59

## 2022-02-09 RX ADMIN — Medication 650 MILLIGRAM(S): at 14:34

## 2022-02-09 RX ADMIN — Medication 3 MILLILITER(S): at 18:53

## 2022-02-09 RX ADMIN — SCOPALAMINE 1 PATCH: 1 PATCH, EXTENDED RELEASE TRANSDERMAL at 20:00

## 2022-02-09 RX ADMIN — Medication 750 MILLIGRAM(S): at 05:52

## 2022-02-09 RX ADMIN — SODIUM CHLORIDE 4 MILLILITER(S): 9 INJECTION INTRAMUSCULAR; INTRAVENOUS; SUBCUTANEOUS at 09:50

## 2022-02-09 RX ADMIN — Medication 650 MILLIGRAM(S): at 15:30

## 2022-02-09 RX ADMIN — LACOSAMIDE 200 MILLIGRAM(S): 50 TABLET ORAL at 14:34

## 2022-02-09 RX ADMIN — SODIUM CHLORIDE 4 MILLILITER(S): 9 INJECTION INTRAMUSCULAR; INTRAVENOUS; SUBCUTANEOUS at 21:25

## 2022-02-09 RX ADMIN — Medication 0.5 MILLIGRAM(S): at 18:54

## 2022-02-09 RX ADMIN — Medication 3 MILLILITER(S): at 03:17

## 2022-02-09 RX ADMIN — Medication 3 MILLILITER(S): at 00:34

## 2022-02-09 RX ADMIN — SENNA PLUS 2 TABLET(S): 8.6 TABLET ORAL at 21:25

## 2022-02-09 RX ADMIN — LEVETIRACETAM 600 MILLIGRAM(S): 250 TABLET, FILM COATED ORAL at 05:52

## 2022-02-09 RX ADMIN — Medication 750 MILLIGRAM(S): at 14:35

## 2022-02-09 RX ADMIN — Medication 300 MILLIGRAM(S): at 14:36

## 2022-02-09 RX ADMIN — Medication 1 TABLET(S): at 14:35

## 2022-02-09 RX ADMIN — Medication 0.5 MILLIGRAM(S): at 05:59

## 2022-02-09 RX ADMIN — LACOSAMIDE 200 MILLIGRAM(S): 50 TABLET ORAL at 21:24

## 2022-02-09 RX ADMIN — Medication 3 MILLILITER(S): at 21:25

## 2022-02-09 RX ADMIN — Medication 3 MILLILITER(S): at 12:30

## 2022-02-09 RX ADMIN — ENOXAPARIN SODIUM 40 MILLIGRAM(S): 100 INJECTION SUBCUTANEOUS at 05:52

## 2022-02-09 NOTE — DISCHARGE NOTE PROVIDER - HOSPITAL COURSE
56F w/pmh cerebral palsy (non-verbal, moans, bedbound at baseline), profound intellectual disability, seizure d/o, constipation, multiple admissions for aspiration PNA, (Pseudomonas & MRSA in sputum) requiring intubation, dysphagia s/p PEG recent admission Jan 19 to Feb 1 for cough, hemoptysis (deemed likely a component of chronic aspiration of secretions), discharged for 2 days, returns back to [facility] for cc oral secretions requiring suctioning that is not available at her [facility].    ·  Problem: Excessive oral secretions.   ·  Plan: -c/w suctioning q4 and PRN  -per nurse at [facility] they do not have the staff for suctioning, patient may need to go somewhere else for suctioning needs ; SW to facilitate  - c/w albuterol and ipratropium nebs q4 and 3% hypertonic saline 4mL BID  - c/w Scopolamine patch q72 hrs to help dry up secretions.    ·  Problem: Acute respiratory failure with hypoxia.   ·  Plan: -likely due to components of chronic aspiration and atelectasis (chronic immobility)  -titrate supplemental O2 down/off prn  -CXR shows clear lungs  -chest PT  -no signs of sepsis/pneumonia, hold off on abx  -if worsening SpO2 will get CT chest  -c/w budesonide.    ·  Problem: Dysphagia.   ·  Plan: -c/w tube feeds- Jevity @ 55ml/hr x24h per last nutrition recommendation  -nutrition consuted  -cannot obtain SLP eval as patient doesn't follow instructions.  -review of last SLP note in 2019 ; patient failed swallow eval due to inability to follow commands, signs of severe dysphagia, and secretions w/need for suction.    ·  Problem: Seizure.   ·  Plan: cont home meds - valproic acid, keppra and vimpat.    :·  Problem: Constipation.   ·  Plan: following MICKEY regimen:  -milk of magnesia 30ml via peg followed by 100ml water on day 2 of no BM  -senna 2 tabs qhs  -fleet enema prn on day 3 of no BM.    Pt is medically stable and optimized to DC w/ outpatient follow up.

## 2022-02-09 NOTE — PROGRESS NOTE ADULT - NSPROGADDITIONALINFOA_GEN_ALL_CORE
Case d/w NP Luis Armando.    Pt's mother updated on plan of care.
DARIUS NP Zeta.    Renetta Carvajal, DO  Available on Teams tono
Case d/w AUSTIN García
Case d/w NP Zeta.
Case d/w KRISTIN García

## 2022-02-09 NOTE — DISCHARGE NOTE PROVIDER - PROVIDER TOKENS
FREE:[LAST:[Renata],FIRST:[Becki],PHONE:[phone]],FAX:[(   )    -],ADDRESS:[[address] [place] [address]]]

## 2022-02-09 NOTE — PROVIDER CONTACT NOTE (CRITICAL VALUE NOTIFICATION) - ASSESSMENT
Pt nonverbal, responds to voice and touch. Opens eyes spontaneously. No sign and symptoms of chest pain noted. WEBB at times

## 2022-02-09 NOTE — DISCHARGE NOTE PROVIDER - NSDCMRMEDTOKEN_GEN_ALL_CORE_FT
albuterol 2.5 mg/3 mL (0.083%) inhalation solution: 3 milliliter(s) inhaled , As Needed  Aquaphor topical ointment: Apply topically to affected area 4 times a day, As Needed for dry lips  budesonide 0.5 mg/2 mL inhalation suspension: 2 milliliter(s) inhaled 2 times a day  calcium carbonate 600 mg oral tablet, chewable: 1 tab(s) by gastrostomy tube once a day (in the morning)  ciclopirox 8% topical solution: Apply topically to affected area once a day  ferrous sulfate 220 mg/5 mL (44 mg/5 mL elemental iron) oral elixir: 5 milliliter(s) by gastrostomy tube once a day (at bedtime)  Fleet Enema 19 g-7 g rectal enema: 133 milliliter(s) rectal once  on day 3 evening of no BM  Fosamax 70 mg oral tablet: 1 tab(s) orally once a week  Keppra 100 mg/mL oral solution: 6 milliliter(s) via PEG 2 times a day  ketoconazole 2% topical shampoo: Apply topically to affected area 3 times a week  MetroLotion 0.75% topical lotion: Apply topically to affected area 2 times a day to face  Milk of Magnesia: 30 milliliter(s) by gastrostomy tube once a day, As Needed  Nayzilam 5 mg/inh nasal spray: 1 puff(s) nasal once for seizure lasting &lt;4mins  senna oral tablet: 2 tab(s) by gastrostomy tube once a day (at bedtime)   sulfamethoxazole-trimethoprim 200 mg-40 mg/5 mL oral suspension: 10 milliliter(s) orally once a day  Thera M oral tablet: 1 tab(s) by gastrostomy tube once a day (in the afternoon)  Tylenol 325 mg oral tablet: 2 tab(s) orally every 8 hours, As Needed  valproic acid 250 mg/5 mL oral liquid: 15 milliliter(s) by gastrostomy tube 3 times a day  Vimpat 10 mg/mL oral solution: 20 milliliter(s) orally 3 times a day MDD:60ml  vitamin A and D topical ointment: Apply topically to affected area once a day to healed wound behind left knee   acetaminophen 160 mg/5 mL oral suspension: 650 milliliter(s) by gastrostomy tube every 6 hours, As Needed - 3)  albuterol 2.5 mg/3 mL (0.083%) inhalation solution: 3 milliliter(s) inhaled every 6 hours, As Needed for shortness of breath/wheezing  budesonide: 0.5 milligram(s) inhaled 2 times a day  calcium carbonate 500 mg (200 mg elemental calcium) oral tablet, chewable: 1 tab(s) by gastrostomy tube once a day  enoxaparin: 40 milligram(s) subcutaneous every 12 hours  ferrous sulfate 300 mg/5 mL (60 mg/5 mL elemental iron) oral liquid: 5 milliliter(s) by PEG tube once a day  lacosamide 10 mg/mL oral solution: 200 milligram(s) by gastrostomy tube 3 times a day at 0700 1500  2200    levETIRAcetam 100 mg/mL oral solution: 6 milliliter(s) by gastrostomy tube 2 times a day  magnesium hydroxide 8% oral suspension: 30 milliliter(s) by gastrostomy tube once a day, As Needed  Multiple Vitamins with Minerals oral tablet: 1 tab(s) by gastrostomy tube once a day  scopolamine: 1 patch transdermal every 72 hours  senna oral tablet: 2 tab(s) by gastrostomy tube once a day (at bedtime)  sodium biphosphate-sodium phosphate 19 g-7 g rectal enema: 1 each rectal once, As needed, constipation, on day 3 of no bowel movement  sodium chloride 3% inhalation solution: 4 milliliter(s) inhaled every 12 hours  valproic acid 250 mg/5 mL oral liquid: 750 milligram(s) by gastrostomy tube 3 times a day  vitamin A and D topical ointment: 1 application topically once a day to affected area

## 2022-02-09 NOTE — DISCHARGE NOTE PROVIDER - NSDCCPCAREPLAN_GEN_ALL_CORE_FT
PRINCIPAL DISCHARGE DIAGNOSIS  Diagnosis: Excessive oral secretions  Assessment and Plan of Treatment: -c/w suctioning q4 and PRN  - c/w albuterol and ipratropium nebs q4 and 3% hypertonic saline 4mL BID  - c/w Scopolamine patch q72 hrs to help dry up secretions.      SECONDARY DISCHARGE DIAGNOSES  Diagnosis: Dysphagia  Assessment and Plan of Treatment: -c/w tube feeds- Jevity @ 55ml/hr x24h       Diagnosis: Constipation  Assessment and Plan of Treatment: -milk of magnesia 30ml via peg followed by 100ml water on day 2 of no BM  -senna 2 tabs qhs  -fleet enema prn on day 3 of no BM.    Diagnosis: Acute respiratory failure with hypoxia  Assessment and Plan of Treatment: -Likely due to components of chronic aspiration and atelectasis (chronic immobility)  -CXR shows clear lungs  -no signs of sepsis/pneumonia, hold off on antibiotics  -c/w budesonide.    Diagnosis: Seizure  Assessment and Plan of Treatment: - Continue with valproic acid, keppra and vimpat.

## 2022-02-09 NOTE — DISCHARGE NOTE PROVIDER - CARE PROVIDER_API CALL
Becki Yanez  [address] [place] [address]  Phone: [phone]  Fax: (   )    -  Follow Up Time:

## 2022-02-10 ENCOUNTER — TRANSCRIPTION ENCOUNTER (OUTPATIENT)
Age: 57
End: 2022-02-10

## 2022-02-10 VITALS — TEMPERATURE: 99 F | DIASTOLIC BLOOD PRESSURE: 56 MMHG | SYSTOLIC BLOOD PRESSURE: 93 MMHG | HEART RATE: 106 BPM

## 2022-02-10 DIAGNOSIS — E87.5 HYPERKALEMIA: ICD-10-CM

## 2022-02-10 LAB
ANION GAP SERPL CALC-SCNC: 7 MMOL/L — SIGNIFICANT CHANGE UP (ref 5–17)
ANION GAP SERPL CALC-SCNC: 8 MMOL/L — SIGNIFICANT CHANGE UP (ref 5–17)
BUN SERPL-MCNC: 23 MG/DL — SIGNIFICANT CHANGE UP (ref 7–23)
BUN SERPL-MCNC: 24 MG/DL — HIGH (ref 7–23)
CALCIUM SERPL-MCNC: 9 MG/DL — SIGNIFICANT CHANGE UP (ref 8.4–10.5)
CALCIUM SERPL-MCNC: 9 MG/DL — SIGNIFICANT CHANGE UP (ref 8.4–10.5)
CHLORIDE SERPL-SCNC: 98 MMOL/L — SIGNIFICANT CHANGE UP (ref 96–108)
CHLORIDE SERPL-SCNC: 98 MMOL/L — SIGNIFICANT CHANGE UP (ref 96–108)
CO2 SERPL-SCNC: 32 MMOL/L — HIGH (ref 22–31)
CO2 SERPL-SCNC: 35 MMOL/L — HIGH (ref 22–31)
CREAT SERPL-MCNC: 0.47 MG/DL — LOW (ref 0.5–1.3)
CREAT SERPL-MCNC: 0.48 MG/DL — LOW (ref 0.5–1.3)
GLUCOSE SERPL-MCNC: 69 MG/DL — LOW (ref 70–99)
GLUCOSE SERPL-MCNC: 86 MG/DL — SIGNIFICANT CHANGE UP (ref 70–99)
HCT VFR BLD CALC: 33.5 % — LOW (ref 34.5–45)
HGB BLD-MCNC: 10.5 G/DL — LOW (ref 11.5–15.5)
MCHC RBC-ENTMCNC: 31.3 GM/DL — LOW (ref 32–36)
MCHC RBC-ENTMCNC: 35.5 PG — HIGH (ref 27–34)
MCV RBC AUTO: 113.2 FL — HIGH (ref 80–100)
NRBC # BLD: 0 /100 WBCS — SIGNIFICANT CHANGE UP (ref 0–0)
PLATELET # BLD AUTO: 144 K/UL — LOW (ref 150–400)
POTASSIUM SERPL-MCNC: 4.9 MMOL/L — SIGNIFICANT CHANGE UP (ref 3.5–5.3)
POTASSIUM SERPL-MCNC: 5.5 MMOL/L — HIGH (ref 3.5–5.3)
POTASSIUM SERPL-SCNC: 4.9 MMOL/L — SIGNIFICANT CHANGE UP (ref 3.5–5.3)
POTASSIUM SERPL-SCNC: 5.5 MMOL/L — HIGH (ref 3.5–5.3)
RBC # BLD: 2.96 M/UL — LOW (ref 3.8–5.2)
RBC # FLD: 16 % — HIGH (ref 10.3–14.5)
SODIUM SERPL-SCNC: 138 MMOL/L — SIGNIFICANT CHANGE UP (ref 135–145)
SODIUM SERPL-SCNC: 140 MMOL/L — SIGNIFICANT CHANGE UP (ref 135–145)
WBC # BLD: 6.37 K/UL — SIGNIFICANT CHANGE UP (ref 3.8–10.5)
WBC # FLD AUTO: 6.37 K/UL — SIGNIFICANT CHANGE UP (ref 3.8–10.5)

## 2022-02-10 PROCEDURE — 0225U NFCT DS DNA&RNA 21 SARSCOV2: CPT

## 2022-02-10 PROCEDURE — 82947 ASSAY GLUCOSE BLOOD QUANT: CPT

## 2022-02-10 PROCEDURE — 85025 COMPLETE CBC W/AUTO DIFF WBC: CPT

## 2022-02-10 PROCEDURE — 83036 HEMOGLOBIN GLYCOSYLATED A1C: CPT

## 2022-02-10 PROCEDURE — 84100 ASSAY OF PHOSPHORUS: CPT

## 2022-02-10 PROCEDURE — 85014 HEMATOCRIT: CPT

## 2022-02-10 PROCEDURE — 80053 COMPREHEN METABOLIC PANEL: CPT

## 2022-02-10 PROCEDURE — 36415 COLL VENOUS BLD VENIPUNCTURE: CPT

## 2022-02-10 PROCEDURE — 96374 THER/PROPH/DIAG INJ IV PUSH: CPT

## 2022-02-10 PROCEDURE — 85018 HEMOGLOBIN: CPT

## 2022-02-10 PROCEDURE — 76937 US GUIDE VASCULAR ACCESS: CPT

## 2022-02-10 PROCEDURE — 84132 ASSAY OF SERUM POTASSIUM: CPT

## 2022-02-10 PROCEDURE — 82435 ASSAY OF BLOOD CHLORIDE: CPT

## 2022-02-10 PROCEDURE — 83605 ASSAY OF LACTIC ACID: CPT

## 2022-02-10 PROCEDURE — 71045 X-RAY EXAM CHEST 1 VIEW: CPT

## 2022-02-10 PROCEDURE — 82330 ASSAY OF CALCIUM: CPT

## 2022-02-10 PROCEDURE — 82803 BLOOD GASES ANY COMBINATION: CPT

## 2022-02-10 PROCEDURE — 96372 THER/PROPH/DIAG INJ SC/IM: CPT

## 2022-02-10 PROCEDURE — 99239 HOSP IP/OBS DSCHRG MGMT >30: CPT

## 2022-02-10 PROCEDURE — 85027 COMPLETE CBC AUTOMATED: CPT

## 2022-02-10 PROCEDURE — U0005: CPT

## 2022-02-10 PROCEDURE — 99285 EMERGENCY DEPT VISIT HI MDM: CPT

## 2022-02-10 PROCEDURE — 94640 AIRWAY INHALATION TREATMENT: CPT

## 2022-02-10 PROCEDURE — 80048 BASIC METABOLIC PNL TOTAL CA: CPT

## 2022-02-10 PROCEDURE — 84295 ASSAY OF SERUM SODIUM: CPT

## 2022-02-10 PROCEDURE — 83735 ASSAY OF MAGNESIUM: CPT

## 2022-02-10 PROCEDURE — 82962 GLUCOSE BLOOD TEST: CPT

## 2022-02-10 PROCEDURE — U0003: CPT

## 2022-02-10 RX ORDER — LACOSAMIDE 50 MG/1
200 TABLET ORAL
Qty: 0 | Refills: 0 | DISCHARGE
Start: 2022-02-10

## 2022-02-10 RX ORDER — BUDESONIDE, MICRONIZED 100 %
0.5 POWDER (GRAM) MISCELLANEOUS
Qty: 0 | Refills: 0 | DISCHARGE
Start: 2022-02-10

## 2022-02-10 RX ORDER — FERROUS SULFATE 325(65) MG
5 TABLET ORAL
Qty: 0 | Refills: 0 | DISCHARGE
Start: 2022-02-10

## 2022-02-10 RX ORDER — ENOXAPARIN SODIUM 100 MG/ML
40 INJECTION SUBCUTANEOUS
Qty: 0 | Refills: 0 | DISCHARGE
Start: 2022-02-10

## 2022-02-10 RX ORDER — LEVETIRACETAM 250 MG/1
5 TABLET, FILM COATED ORAL
Qty: 0 | Refills: 0 | DISCHARGE
Start: 2022-02-10

## 2022-02-10 RX ORDER — SODIUM CHLORIDE 9 MG/ML
4 INJECTION INTRAMUSCULAR; INTRAVENOUS; SUBCUTANEOUS
Qty: 0 | Refills: 0 | DISCHARGE
Start: 2022-02-10

## 2022-02-10 RX ORDER — SODIUM ZIRCONIUM CYCLOSILICATE 10 G/10G
5 POWDER, FOR SUSPENSION ORAL ONCE
Refills: 0 | Status: COMPLETED | OUTPATIENT
Start: 2022-02-10 | End: 2022-02-10

## 2022-02-10 RX ORDER — VALPROIC ACID (AS SODIUM SALT) 250 MG/5ML
750 SOLUTION, ORAL ORAL
Qty: 0 | Refills: 0 | DISCHARGE
Start: 2022-02-10

## 2022-02-10 RX ORDER — MULTIVIT-MIN/FERROUS GLUCONATE 9 MG/15 ML
1 LIQUID (ML) ORAL
Qty: 0 | Refills: 0 | DISCHARGE
Start: 2022-02-10

## 2022-02-10 RX ORDER — SALICYLIC ACID 0.5 %
1 CLEANSER (GRAM) TOPICAL
Qty: 0 | Refills: 0 | DISCHARGE
Start: 2022-02-10

## 2022-02-10 RX ORDER — MAGNESIUM HYDROXIDE 400 MG/1
30 TABLET, CHEWABLE ORAL
Qty: 0 | Refills: 0 | DISCHARGE
Start: 2022-02-10

## 2022-02-10 RX ORDER — ACETAMINOPHEN 500 MG
650 TABLET ORAL
Qty: 0 | Refills: 0 | DISCHARGE
Start: 2022-02-10

## 2022-02-10 RX ORDER — SENNA PLUS 8.6 MG/1
2 TABLET ORAL
Qty: 0 | Refills: 0 | DISCHARGE
Start: 2022-02-10

## 2022-02-10 RX ORDER — CALCIUM CARBONATE 500(1250)
1 TABLET ORAL
Qty: 0 | Refills: 0 | DISCHARGE
Start: 2022-02-10

## 2022-02-10 RX ORDER — SCOPALAMINE 1 MG/3D
1 PATCH, EXTENDED RELEASE TRANSDERMAL
Qty: 0 | Refills: 0 | DISCHARGE
Start: 2022-02-10

## 2022-02-10 RX ORDER — LEVETIRACETAM 250 MG/1
6 TABLET, FILM COATED ORAL
Qty: 0 | Refills: 0 | DISCHARGE
Start: 2022-02-10

## 2022-02-10 RX ADMIN — SCOPALAMINE 1 PATCH: 1 PATCH, EXTENDED RELEASE TRANSDERMAL at 13:10

## 2022-02-10 RX ADMIN — Medication 750 MILLIGRAM(S): at 05:43

## 2022-02-10 RX ADMIN — Medication 300 MILLIGRAM(S): at 13:09

## 2022-02-10 RX ADMIN — Medication 3 MILLILITER(S): at 13:10

## 2022-02-10 RX ADMIN — LACOSAMIDE 200 MILLIGRAM(S): 50 TABLET ORAL at 14:36

## 2022-02-10 RX ADMIN — SODIUM ZIRCONIUM CYCLOSILICATE 5 GRAM(S): 10 POWDER, FOR SUSPENSION ORAL at 13:09

## 2022-02-10 RX ADMIN — Medication 1 TABLET(S): at 13:09

## 2022-02-10 RX ADMIN — ENOXAPARIN SODIUM 40 MILLIGRAM(S): 100 INJECTION SUBCUTANEOUS at 05:44

## 2022-02-10 RX ADMIN — LACOSAMIDE 200 MILLIGRAM(S): 50 TABLET ORAL at 05:43

## 2022-02-10 RX ADMIN — SCOPALAMINE 1 PATCH: 1 PATCH, EXTENDED RELEASE TRANSDERMAL at 07:47

## 2022-02-10 RX ADMIN — LEVETIRACETAM 600 MILLIGRAM(S): 250 TABLET, FILM COATED ORAL at 05:43

## 2022-02-10 RX ADMIN — Medication 1 APPLICATION(S): at 13:22

## 2022-02-10 RX ADMIN — Medication 3 MILLILITER(S): at 05:44

## 2022-02-10 RX ADMIN — Medication 750 MILLIGRAM(S): at 13:09

## 2022-02-10 RX ADMIN — Medication 0.5 MILLIGRAM(S): at 05:45

## 2022-02-10 RX ADMIN — SCOPALAMINE 1 PATCH: 1 PATCH, EXTENDED RELEASE TRANSDERMAL at 14:37

## 2022-02-10 RX ADMIN — Medication 3 MILLILITER(S): at 02:24

## 2022-02-10 NOTE — DISCHARGE NOTE NURSING/CASE MANAGEMENT/SOCIAL WORK - PATIENT PORTAL LINK FT
You can access the FollowMyHealth Patient Portal offered by [facility] by registering at the following website: [email or web address]. By joining [facility]’s FollowMyHealth portal, you will also be able to view your health information using other applications (apps) compatible with our system.

## 2022-02-10 NOTE — PROGRESS NOTE ADULT - PROBLEM SELECTOR PLAN 7
lovenox BID       Discharge planning to SNF  that can do suctioning in the short term ; family wants her back in a group home thereafter.

## 2022-02-10 NOTE — PROGRESS NOTE ADULT - SUBJECTIVE AND OBJECTIVE BOX
Patient is a 56y old  Female who presents with a chief complaint of Requirement for oral suctioning (05 Feb 2022 15:05)      SUBJECTIVE / OVERNIGHT EVENTS:  Pt seen and examined. No acute events overnight. Unable to obtain ROS on account of cognitive impairment. Pt appears comfortable.       MEDICATIONS  (STANDING):  albuterol/ipratropium for Nebulization 3 milliLiter(s) Nebulizer every 4 hours  buDESOnide    Inhalation Suspension 0.5 milliGRAM(s) Inhalation two times a day  calcium carbonate    500 mG (Tums) Chewable 1 Tablet(s) Enteral Tube daily  dextrose 40% Gel 15 Gram(s) Oral once  dextrose 5%. 1000 milliLiter(s) (50 mL/Hr) IV Continuous <Continuous>  dextrose 5%. 1000 milliLiter(s) (100 mL/Hr) IV Continuous <Continuous>  dextrose 50% Injectable 25 Gram(s) IV Push once  dextrose 50% Injectable 12.5 Gram(s) IV Push once  dextrose 50% Injectable 25 Gram(s) IV Push once  enoxaparin Injectable 40 milliGRAM(s) SubCutaneous every 12 hours  ferrous    sulfate Liquid 300 milliGRAM(s) Enteral Tube daily  glucagon  Injectable 1 milliGRAM(s) IntraMuscular once  lacosamide Solution 200 milliGRAM(s) Oral <User Schedule>  levETIRAcetam  Solution 600 milliGRAM(s) Oral two times a day  multivitamin/minerals 1 Tablet(s) Oral daily  senna 2 Tablet(s) Oral at bedtime  sodium chloride 3%  Inhalation 4 milliLiter(s) Inhalation every 12 hours  valproic  acid Syrup 750 milliGRAM(s) Enteral Tube three times a day  vitamin A &amp; D Ointment 1 Application(s) Topical daily    MEDICATIONS  (PRN):  acetaminophen    Suspension .. 650 milliGRAM(s) Oral every 6 hours PRN Temp greater or equal to 38C (100.4F), Mild Pain (1 - 3)  magnesium hydroxide Suspension 30 milliLiter(s) Oral daily PRN Constipation  saline laxative (FLEET) Rectal Enema 1 Enema Rectal once PRN constipation, on day 3 of no bowel movement      Vital Signs Last 24 Hrs  T(C): 37 (06 Feb 2022 06:04), Max: 37 (06 Feb 2022 06:04)  T(F): 98.6 (06 Feb 2022 06:04), Max: 98.6 (06 Feb 2022 06:04)  HR: 90 (06 Feb 2022 06:04) (84 - 90)  BP: 115/69 (06 Feb 2022 06:04) (108/60 - 122/85)  BP(mean): --  RR: 19 (06 Feb 2022 06:04) (18 - 19)  SpO2: 95% (06 Feb 2022 06:04) (94% - 98%)  CAPILLARY BLOOD GLUCOSE        I&O's Summary    05 Feb 2022 07:01  -  06 Feb 2022 07:00  --------------------------------------------------------  IN: 0 mL / OUT: 450 mL / NET: -450 mL    06 Feb 2022 07:01  -  06 Feb 2022 11:06  --------------------------------------------------------  IN: 165 mL / OUT: 0 mL / NET: 165 mL        PHYSICAL EXAM:  GENERAL: Well-groomed, in no apparent distress, obese  EYES: No conjunctival or scleral injection, non-icteric; PERRLA and symmetric  ENMT: No external nasal lesions; no pharyngeal injection or exudates, oral mucosa with moist membranes, no drooling, no pooling of secretions in mouth  NECK: Trachea midline without palpable neck mass; thyroid not enlarged and non-tender  RESPIRATORY: Breathing comfortably; lungs CTA anteriorly  CARDIOVASCULAR: +S1S2, RRR, no M/G/R; pedal pulses full and symmetric; no lower extremity edema  GASTROINTESTINAL: No palpable masses or tenderness, +BS throughout, no rebound/guarding; no hepatosplenomegaly; +PEG tube in place  MUSCULOSKELETAL: no digital clubbing or cyanosis; no paraspinal tenderness; b/l LE muscle wasting  NEUROLOGIC: tracks with eyes appropriately, does not follow commands, does not speak  PSYCHIATRIC: sleeping, appears comfortable   LABS:                        10.9   6.37  )-----------( 152      ( 06 Feb 2022 09:52 )             34.3     02-05    134<L>  |  91<L>  |  18  ----------------------------<  97  4.4   |  30  |  0.50    Ca    9.3      05 Feb 2022 10:29  Phos  4.6     02-05  Mg     2.0     02-05                
Patient is a 56y old  Female who presents with a chief complaint of Requirement for oral suctioning (06 Feb 2022 11:06)      SUBJECTIVE / OVERNIGHT EVENTS: Pt seen and examined. No acute events overnight. Unable to obtain ROS on account of cognitive impairment. Pt appears comfortable.         MEDICATIONS  (STANDING):  albuterol/ipratropium for Nebulization 3 milliLiter(s) Nebulizer every 4 hours  buDESOnide    Inhalation Suspension 0.5 milliGRAM(s) Inhalation two times a day  calcium carbonate    500 mG (Tums) Chewable 1 Tablet(s) Enteral Tube daily  dextrose 40% Gel 15 Gram(s) Oral once  dextrose 5%. 1000 milliLiter(s) (50 mL/Hr) IV Continuous <Continuous>  dextrose 5%. 1000 milliLiter(s) (100 mL/Hr) IV Continuous <Continuous>  dextrose 50% Injectable 25 Gram(s) IV Push once  dextrose 50% Injectable 12.5 Gram(s) IV Push once  dextrose 50% Injectable 25 Gram(s) IV Push once  enoxaparin Injectable 40 milliGRAM(s) SubCutaneous every 12 hours  ferrous    sulfate Liquid 300 milliGRAM(s) Enteral Tube daily  glucagon  Injectable 1 milliGRAM(s) IntraMuscular once  lacosamide Solution 200 milliGRAM(s) Oral <User Schedule>  levETIRAcetam  Solution 600 milliGRAM(s) Oral two times a day  multivitamin/minerals 1 Tablet(s) Oral daily  scopolamine 1 mG/72 Hr(s) Patch 1 Patch Transdermal every 72 hours  senna 2 Tablet(s) Oral at bedtime  sodium chloride 3%  Inhalation 4 milliLiter(s) Inhalation every 12 hours  valproic  acid Syrup 750 milliGRAM(s) Enteral Tube three times a day  vitamin A &amp; D Ointment 1 Application(s) Topical daily    MEDICATIONS  (PRN):  acetaminophen    Suspension .. 650 milliGRAM(s) Oral every 6 hours PRN Temp greater or equal to 38C (100.4F), Mild Pain (1 - 3)  magnesium hydroxide Suspension 30 milliLiter(s) Oral daily PRN Constipation  saline laxative (FLEET) Rectal Enema 1 Enema Rectal once PRN constipation, on day 3 of no bowel movement      Vital Signs Last 24 Hrs  T(C): 36.3 (07 Feb 2022 08:26), Max: 36.7 (07 Feb 2022 06:27)  T(F): 97.3 (07 Feb 2022 08:26), Max: 98 (07 Feb 2022 06:27)  HR: 81 (07 Feb 2022 08:26) (63 - 87)  BP: 115/71 (07 Feb 2022 08:26) (93/70 - 115/71)  BP(mean): --  RR: 18 (07 Feb 2022 08:26) (17 - 18)  SpO2: 99% (07 Feb 2022 08:26) (94% - 99%)  CAPILLARY BLOOD GLUCOSE        I&O's Summary    06 Feb 2022 07:01  -  07 Feb 2022 07:00  --------------------------------------------------------  IN: 550 mL / OUT: 200 mL / NET: 350 mL    07 Feb 2022 07:01  -  07 Feb 2022 11:42  --------------------------------------------------------  IN: 165 mL / OUT: 0 mL / NET: 165 mL        PHYSICAL EXAM:  GENERAL: Well-groomed, in no apparent distress, obese  EYES: No conjunctival or scleral injection, non-icteric; PERRLA and symmetric  ENMT: No external nasal lesions; no pharyngeal injection or exudates, oral mucosa with moist membranes, no drooling, no pooling of secretions in mouth  NECK: Trachea midline without palpable neck mass; thyroid not enlarged and non-tender  RESPIRATORY: Breathing comfortably; lungs CTA anteriorly  CARDIOVASCULAR: +S1S2, RRR, no M/G/R; pedal pulses full and symmetric; no lower extremity edema  GASTROINTESTINAL: No palpable masses or tenderness, +BS throughout, no rebound/guarding; no hepatosplenomegaly; +PEG tube in place  MUSCULOSKELETAL: no digital clubbing or cyanosis; no paraspinal tenderness; b/l LE muscle wasting  NEUROLOGIC: tracks with eyes appropriately, does not follow commands, does not speak  PSYCHIATRIC: sleeping, appears comfortable     LABS:                        11.3   6.32  )-----------( 144      ( 07 Feb 2022 09:59 )             35.2     02-07    139  |  96  |  19  ----------------------------<  102<H>  4.5   |  37<H>  |  0.45<L>    Ca    9.0      07 Feb 2022 09:59  Phos  4.2     02-07  Mg     2.0     02-06                
Patient is a 56y old  Female who presents with a chief complaint of Requirement for oral suctioning (07 Feb 2022 11:41)      SUBJECTIVE / OVERNIGHT EVENTS: Pt seen and examined. No acute events overnight. Unable to obtain ROS on account of cognitive impairment. Pt appears comfortable.         MEDICATIONS  (STANDING):  albuterol/ipratropium for Nebulization 3 milliLiter(s) Nebulizer every 4 hours  buDESOnide    Inhalation Suspension 0.5 milliGRAM(s) Inhalation two times a day  calcium carbonate    500 mG (Tums) Chewable 1 Tablet(s) Enteral Tube daily  dextrose 40% Gel 15 Gram(s) Oral once  dextrose 5%. 1000 milliLiter(s) (50 mL/Hr) IV Continuous <Continuous>  dextrose 5%. 1000 milliLiter(s) (100 mL/Hr) IV Continuous <Continuous>  dextrose 50% Injectable 25 Gram(s) IV Push once  dextrose 50% Injectable 12.5 Gram(s) IV Push once  dextrose 50% Injectable 25 Gram(s) IV Push once  enoxaparin Injectable 40 milliGRAM(s) SubCutaneous every 12 hours  ferrous    sulfate Liquid 300 milliGRAM(s) Enteral Tube daily  glucagon  Injectable 1 milliGRAM(s) IntraMuscular once  lacosamide Solution 200 milliGRAM(s) Oral <User Schedule>  levETIRAcetam  Solution 600 milliGRAM(s) Oral two times a day  multivitamin/minerals 1 Tablet(s) Oral daily  scopolamine 1 mG/72 Hr(s) Patch 1 Patch Transdermal every 72 hours  senna 2 Tablet(s) Oral at bedtime  sodium chloride 3%  Inhalation 4 milliLiter(s) Inhalation every 12 hours  valproic  acid Syrup 750 milliGRAM(s) Enteral Tube three times a day  vitamin A &amp; D Ointment 1 Application(s) Topical daily    MEDICATIONS  (PRN):  acetaminophen    Suspension .. 650 milliGRAM(s) Oral every 6 hours PRN Temp greater or equal to 38C (100.4F), Mild Pain (1 - 3)  magnesium hydroxide Suspension 30 milliLiter(s) Oral daily PRN Constipation  saline laxative (FLEET) Rectal Enema 1 Enema Rectal once PRN constipation, on day 3 of no bowel movement      Vital Signs Last 24 Hrs  T(C): 36.6 (08 Feb 2022 08:20), Max: 36.6 (08 Feb 2022 08:20)  T(F): 97.8 (08 Feb 2022 08:20), Max: 97.8 (08 Feb 2022 08:20)  HR: 73 (08 Feb 2022 08:20) (73 - 98)  BP: 115/58 (08 Feb 2022 08:20) (109/71 - 142/84)  BP(mean): --  RR: 18 (08 Feb 2022 08:20) (18 - 18)  SpO2: 98% (08 Feb 2022 08:20) (93% - 100%)  CAPILLARY BLOOD GLUCOSE      POCT Blood Glucose.: 101 mg/dL (07 Feb 2022 22:59)    I&O's Summary    07 Feb 2022 07:01  -  08 Feb 2022 07:00  --------------------------------------------------------  IN: 1155 mL / OUT: 1050 mL / NET: 105 mL    08 Feb 2022 07:01  -  08 Feb 2022 12:22  --------------------------------------------------------  IN: 220 mL / OUT: 0 mL / NET: 220 mL        PHYSICAL EXAM:  GENERAL: Well-groomed, in no apparent distress, obese  EYES: No conjunctival or scleral injection, non-icteric; PERRLA and symmetric  ENMT: No external nasal lesions; no pharyngeal injection or exudates, oral mucosa with moist membranes, no drooling, no pooling of secretions in mouth  NECK: Trachea midline without palpable neck mass; thyroid not enlarged and non-tender  RESPIRATORY: Breathing comfortably; lungs CTA anteriorly  CARDIOVASCULAR: +S1S2, RRR, no M/G/R; pedal pulses full and symmetric; no lower extremity edema  GASTROINTESTINAL: No palpable masses or tenderness, +BS throughout, no rebound/guarding; no hepatosplenomegaly; +PEG tube in place  MUSCULOSKELETAL: no digital clubbing or cyanosis; no paraspinal tenderness; b/l LE muscle wasting  NEUROLOGIC: tracks with eyes appropriately, does not follow commands, does not speak  PSYCHIATRIC: sleeping, appears comfortable       LABS:                        11.3   6.32  )-----------( 144      ( 07 Feb 2022 09:59 )             35.2     02-07    139  |  96  |  19  ----------------------------<  102<H>  4.5   |  37<H>  |  0.45<L>    Ca    9.0      07 Feb 2022 09:59  Phos  4.2     02-07                
[facility] of [facility] Medicine  Renetta DO Wei  Pager (M-F, 1T-7H): [phone]  Other Times:  [phone]    Patient is a 56y old  Female who presents with a chief complaint of Requirement for oral suctioning (05 Feb 2022 11:08)      SUBJECTIVE / OVERNIGHT EVENTS: none. Patient had a lot of secretions that required suctioning again. This morning, patient was sleeping, appeared comfortable.   ADDITIONAL REVIEW OF SYSTEMS: negative    MEDICATIONS  (STANDING):  albuterol/ipratropium for Nebulization 3 milliLiter(s) Nebulizer every 4 hours  buDESOnide    Inhalation Suspension 0.5 milliGRAM(s) Inhalation two times a day  calcium carbonate    500 mG (Tums) Chewable 1 Tablet(s) Enteral Tube daily  dextrose 40% Gel 15 Gram(s) Oral once  dextrose 5%. 1000 milliLiter(s) (50 mL/Hr) IV Continuous <Continuous>  dextrose 5%. 1000 milliLiter(s) (100 mL/Hr) IV Continuous <Continuous>  dextrose 50% Injectable 25 Gram(s) IV Push once  dextrose 50% Injectable 12.5 Gram(s) IV Push once  dextrose 50% Injectable 25 Gram(s) IV Push once  enoxaparin Injectable 40 milliGRAM(s) SubCutaneous every 12 hours  ferrous    sulfate Liquid 300 milliGRAM(s) Enteral Tube daily  glucagon  Injectable 1 milliGRAM(s) IntraMuscular once  lacosamide Solution 200 milliGRAM(s) Oral <User Schedule>  levETIRAcetam  Solution 600 milliGRAM(s) Oral two times a day  multivitamin/minerals 1 Tablet(s) Oral daily  senna 2 Tablet(s) Oral at bedtime  sodium chloride 3%  Inhalation 4 milliLiter(s) Inhalation every 12 hours  valproic  acid Syrup 750 milliGRAM(s) Enteral Tube three times a day  vitamin A &amp; D Ointment 1 Application(s) Topical daily    MEDICATIONS  (PRN):  acetaminophen    Suspension .. 650 milliGRAM(s) Oral every 6 hours PRN Temp greater or equal to 38C (100.4F), Mild Pain (1 - 3)  magnesium hydroxide Suspension 30 milliLiter(s) Oral daily PRN Constipation  saline laxative (FLEET) Rectal Enema 1 Enema Rectal once PRN constipation, on day 3 of no bowel movement      CAPILLARY BLOOD GLUCOSE        I&O's Summary    04 Feb 2022 07:01  -  05 Feb 2022 07:00  --------------------------------------------------------  IN: 0 mL / OUT: 200 mL / NET: -200 mL    05 Feb 2022 07:01  -  05 Feb 2022 15:06  --------------------------------------------------------  IN: 0 mL / OUT: 0 mL / NET: 0 mL        PHYSICAL EXAM:  Vital Signs Last 24 Hrs  T(C): 36.4 (05 Feb 2022 12:32), Max: 36.8 (04 Feb 2022 16:00)  T(F): 97.5 (05 Feb 2022 12:32), Max: 98.2 (04 Feb 2022 16:00)  HR: 84 (05 Feb 2022 12:32) (80 - 99)  BP: 122/85 (05 Feb 2022 12:32) (97/58 - 132/79)  BP(mean): --  RR: 18 (05 Feb 2022 12:32) (18 - 19)  SpO2: 98% (05 Feb 2022 12:32) (98% - 100%)    CONSTITUTIONAL: Well-groomed, in no apparent distress, obese  EYES: No conjunctival or scleral injection, non-icteric; PERRLA and symmetric  ENMT: No external nasal lesions; no pharyngeal injection or exudates, oral mucosa with moist membranes, no drooling, no pooling of secretions in mouth  NECK: Trachea midline without palpable neck mass; thyroid not enlarged and non-tender  RESPIRATORY: Breathing comfortably; lungs CTA anteriorly  CARDIOVASCULAR: +S1S2, RRR, no M/G/R; pedal pulses full and symmetric; no lower extremity edema  GASTROINTESTINAL: No palpable masses or tenderness, +BS throughout, no rebound/guarding; no hepatosplenomegaly; +PEG tube in place  MUSCULOSKELETAL: no digital clubbing or cyanosis; no paraspinal tenderness; b/l LE muscle wasting  NEUROLOGIC: tracks with eyes appropriately, does not follow commands, does not speak  PSYCHIATRIC: sleeping, appears comfortable     LABS:                        11.9   6.91  )-----------( 128      ( 05 Feb 2022 10:29 )             37.1     02-05    134<L>  |  91<L>  |  18  ----------------------------<  97  4.4   |  30  |  0.50    Ca    9.3      05 Feb 2022 10:29  Phos  4.6     02-05  Mg     2.0     02-05    TPro  7.0  /  Alb  3.4  /  TBili  0.3  /  DBili  x   /  AST  23  /  ALT  17  /  AlkPhos  85  02-04    
Patient is a 56y old  Female who presents with a chief complaint of Requirement for oral suctioning (09 Feb 2022 12:31)      SUBJECTIVE / OVERNIGHT EVENTS:  Pt seen and examined. No acute events overnight. Unable to obtain ROS on account of cognitive impairment. Pt appears comfortable.         MEDICATIONS  (STANDING):  albuterol/ipratropium for Nebulization 3 milliLiter(s) Nebulizer every 4 hours  buDESOnide    Inhalation Suspension 0.5 milliGRAM(s) Inhalation two times a day  calcium carbonate    500 mG (Tums) Chewable 1 Tablet(s) Enteral Tube daily  enoxaparin Injectable 40 milliGRAM(s) SubCutaneous every 12 hours  ferrous    sulfate Liquid 300 milliGRAM(s) Enteral Tube daily  glucagon  Injectable 1 milliGRAM(s) IntraMuscular once  lacosamide Solution 200 milliGRAM(s) Oral <User Schedule>  levETIRAcetam  Solution 600 milliGRAM(s) Oral two times a day  multivitamin/minerals 1 Tablet(s) Oral daily  scopolamine 1 mG/72 Hr(s) Patch 1 Patch Transdermal every 72 hours  senna 2 Tablet(s) Oral at bedtime  sodium chloride 3%  Inhalation 4 milliLiter(s) Inhalation every 12 hours  sodium zirconium cyclosilicate 5 Gram(s) Oral once  valproic  acid Syrup 750 milliGRAM(s) Enteral Tube three times a day  vitamin A &amp; D Ointment 1 Application(s) Topical daily    MEDICATIONS  (PRN):  acetaminophen    Suspension .. 650 milliGRAM(s) Oral every 6 hours PRN Temp greater or equal to 38C (100.4F), Mild Pain (1 - 3)  magnesium hydroxide Suspension 30 milliLiter(s) Oral daily PRN Constipation  saline laxative (FLEET) Rectal Enema 1 Enema Rectal once PRN constipation, on day 3 of no bowel movement      Vital Signs Last 24 Hrs  T(C): 37.2 (10 Feb 2022 12:26), Max: 37.2 (10 Feb 2022 12:26)  T(F): 99 (10 Feb 2022 12:26), Max: 99 (10 Feb 2022 12:26)  HR: 106 (10 Feb 2022 12:26) (68 - 106)  BP: 93/56 (10 Feb 2022 12:26) (93/56 - 111/73)  BP(mean): --  RR: 17 (10 Feb 2022 05:30) (17 - 18)  SpO2: 94% (10 Feb 2022 05:30) (93% - 95%)  CAPILLARY BLOOD GLUCOSE        I&O's Summary    09 Feb 2022 07:01  -  10 Feb 2022 07:00  --------------------------------------------------------  IN: 0 mL / OUT: 0 mL / NET: 0 mL    10 Feb 2022 07:01  -  10 Feb 2022 12:48  --------------------------------------------------------  IN: 0 mL / OUT: 0 mL / NET: 0 mL        PHYSICAL EXAM:  GENERAL: Well-groomed, in no apparent distress, obese  EYES: No conjunctival or scleral injection, non-icteric; PERRLA and symmetric  ENMT: No external nasal lesions; no pharyngeal injection or exudates, oral mucosa with moist membranes, no drooling, no pooling of secretions in mouth  NECK: Trachea midline without palpable neck mass; thyroid not enlarged and non-tender  RESPIRATORY: Breathing comfortably; lungs CTA anteriorly  CARDIOVASCULAR: +S1S2, RRR, no M/G/R; pedal pulses full and symmetric; no lower extremity edema  GASTROINTESTINAL: No palpable masses or tenderness, +BS throughout, no rebound/guarding; no hepatosplenomegaly; +PEG tube in place  MUSCULOSKELETAL: no digital clubbing or cyanosis; no paraspinal tenderness; b/l LE muscle wasting  NEUROLOGIC: tracks with eyes appropriately, does not follow commands, does not speak  PSYCHIATRIC: sleeping, appears comfortable       LABS:                        10.5   6.37  )-----------( 144      ( 10 Feb 2022 06:46 )             33.5     02-10    138  |  98  |  23  ----------------------------<  86  5.5<H>   |  32<H>  |  0.48<L>    Ca    9.0      10 Feb 2022 06:47                
Patient is a 56y old  Female who presents with a chief complaint of Requirement for oral suctioning (09 Feb 2022 12:31)      SUBJECTIVE / OVERNIGHT EVENTS:  Pt seen and examined. No acute events overnight. Unable to obtain ROS on account of cognitive impairment. Pt appears comfortable.       MEDICATIONS  (STANDING):  albuterol/ipratropium for Nebulization 3 milliLiter(s) Nebulizer every 4 hours  buDESOnide    Inhalation Suspension 0.5 milliGRAM(s) Inhalation two times a day  calcium carbonate    500 mG (Tums) Chewable 1 Tablet(s) Enteral Tube daily  enoxaparin Injectable 40 milliGRAM(s) SubCutaneous every 12 hours  ferrous    sulfate Liquid 300 milliGRAM(s) Enteral Tube daily  glucagon  Injectable 1 milliGRAM(s) IntraMuscular once  lacosamide Solution 200 milliGRAM(s) Oral <User Schedule>  levETIRAcetam  Solution 600 milliGRAM(s) Oral two times a day  multivitamin/minerals 1 Tablet(s) Oral daily  scopolamine 1 mG/72 Hr(s) Patch 1 Patch Transdermal every 72 hours  senna 2 Tablet(s) Oral at bedtime  sodium chloride 3%  Inhalation 4 milliLiter(s) Inhalation every 12 hours  valproic  acid Syrup 750 milliGRAM(s) Enteral Tube three times a day  vitamin A &amp; D Ointment 1 Application(s) Topical daily    MEDICATIONS  (PRN):  acetaminophen    Suspension .. 650 milliGRAM(s) Oral every 6 hours PRN Temp greater or equal to 38C (100.4F), Mild Pain (1 - 3)  magnesium hydroxide Suspension 30 milliLiter(s) Oral daily PRN Constipation  saline laxative (FLEET) Rectal Enema 1 Enema Rectal once PRN constipation, on day 3 of no bowel movement      Vital Signs Last 24 Hrs  T(C): 36.4 (09 Feb 2022 13:32), Max: 36.4 (09 Feb 2022 08:37)  T(F): 97.6 (09 Feb 2022 13:32), Max: 97.6 (09 Feb 2022 13:32)  HR: 86 (09 Feb 2022 13:32) (66 - 91)  BP: 105/61 (09 Feb 2022 13:32) (104/70 - 118/70)  BP(mean): --  RR: 18 (09 Feb 2022 13:32) (18 - 18)  SpO2: 95% (09 Feb 2022 13:32) (95% - 100%)  CAPILLARY BLOOD GLUCOSE        I&O's Summary    08 Feb 2022 07:01  -  09 Feb 2022 07:00  --------------------------------------------------------  IN: 655 mL / OUT: 400 mL / NET: 255 mL    09 Feb 2022 07:01  -  09 Feb 2022 13:40  --------------------------------------------------------  IN: 0 mL / OUT: 0 mL / NET: 0 mL        PHYSICAL EXAM:  GENERAL: Well-groomed, in no apparent distress, obese  EYES: No conjunctival or scleral injection, non-icteric; PERRLA and symmetric  ENMT: No external nasal lesions; no pharyngeal injection or exudates, oral mucosa with moist membranes, no drooling, no pooling of secretions in mouth  NECK: Trachea midline without palpable neck mass; thyroid not enlarged and non-tender  RESPIRATORY: Breathing comfortably; lungs CTA anteriorly  CARDIOVASCULAR: +S1S2, RRR, no M/G/R; pedal pulses full and symmetric; no lower extremity edema  GASTROINTESTINAL: No palpable masses or tenderness, +BS throughout, no rebound/guarding; no hepatosplenomegaly; +PEG tube in place  MUSCULOSKELETAL: no digital clubbing or cyanosis; no paraspinal tenderness; b/l LE muscle wasting  NEUROLOGIC: tracks with eyes appropriately, does not follow commands, does not speak  PSYCHIATRIC: sleeping, appears comfortable     LABS:    02-09    140  |  99  |  23  ----------------------------<  79  6.4<HH>   |  32<H>  |  0.42<L>    Ca    8.9      09 Feb 2022 10:45

## 2022-02-10 NOTE — PROGRESS NOTE ADULT - PROBLEM SELECTOR PLAN 6
lovenox BID for obesity  tube feeds started    SW for dispo - needs facility that can do suctioning.
lovenox BID for obesity  tube feeds started    SW for dispo - needs SNF  that can do suctioning.
lovenox BID for obesity  tube feeds started    SW for dispo - needs SNF  that can do suctioning in the short term ; family wants her back in a group home thereafter.
lovenox BID for obesity  tube feeds started    SW for dispo - needs SNF  that can do suctioning in the short term ; family wants her back in a group home thereafter.
lovenox BID for obesity  tube feeds started    SW for dispo - needs facility that can do suctioning.
following MICKEY regimen:  -milk of magnesia 30ml via peg followed by 100ml water on day 2 of no BM  -senna 2 tabs qhs  -fleet enema prn on day 3 of no BM

## 2022-02-10 NOTE — PROGRESS NOTE ADULT - PROBLEM SELECTOR PLAN 4
cont home meds - valproic acid, keppra and vimpat

## 2022-02-10 NOTE — PROGRESS NOTE ADULT - PROBLEM SELECTOR PLAN 5
following MICKEY regimen:  -milk of magnesia 30ml via peg followed by 100ml water on day 2 of no BM  -senna 2 tabs qhs  -fleet enema prn on day 3 of no BM
- Potassium is 5.5 today   - will give Lokelma 5mg x 1  - repeat BMP thereafter
following MICKEY regimen:  -milk of magnesia 30ml via peg followed by 100ml water on day 2 of no BM  -senna 2 tabs qhs  -fleet enema prn on day 3 of no BM

## 2022-02-10 NOTE — PROGRESS NOTE ADULT - PROBLEM SELECTOR PLAN 1
-c/w suctioning q4 and PRN  -per nurse at [facility] they do not have the staff for suctioning, patient may need to go somewhere else for suctioning needs ; SW to facilitate  - c/w albuterol and ipratropium nebs q4 and 3% hypertonic saline 4mL BID  - will add Scopolamine patch q72 hrs to help dry up secretions
-c/w suctioning q4 and PRN  -per nurse at [facility] they do not have the staff for suctioning, patient will need to go to another facility in the short term for suctioning needs ; SW facilitating  - c/w albuterol and ipratropium nebs q4 and 3% hypertonic saline 4mL BID  - c/w Scopolamine patch q72 hrs
-c/w suctioning q4 and PRN  -per nurse at [facility] they do not have the staff for suctioning, patient may need to go somewhere else for suctioning needs ; SW to facilitate  - c/w albuterol and ipratropium nebs q4 and 3% hypertonic saline 4mL BID;
-c/w suctioning q4 and PRN  -per nurse at [facility] they do not have the staff for suctioning, patient may need to go somewhere else for suctioning needs ; SW to facilitate  - c/w albuterol and ipratropium nebs q4 and 3% hypertonic saline 4mL BID  - c/w Scopolamine patch q72 hrs to help dry up secretions
-ordered for suctioning q4 and PRN  -per nurse at [facility] they do not have the staff for suctioning, patient may need to go somewhere else for suctioning needs  -last admission was given albuterol and ipratropium nebs q4 and 3% hypertonic saline 4mL BID; unclear if this was continued at [facility] since these meds are not listed on her last discharge note or her meds list provided by facility. Resume now.
-c/w suctioning q4 and PRN  -per nurse at [facility] they do not have the staff for suctioning, patient will need to go to another facility in the short term for suctioning needs ; SW facilitating  - c/w albuterol and ipratropium nebs q4 and 3% hypertonic saline 4mL BID  - c/w Scopolamine patch q72 hrs

## 2022-02-10 NOTE — PROGRESS NOTE ADULT - PROBLEM SELECTOR PROBLEM 1
Excessive oral secretions

## 2022-02-10 NOTE — PROGRESS NOTE ADULT - TIME BILLING
case complexity and discharge planning. Pt is clinically stable for discharge today.    Case d/w AUSTIN Katz

## 2022-02-10 NOTE — PROGRESS NOTE ADULT - PROBLEM SELECTOR PROBLEM 6
Prophylactic measure
Constipation
Prophylactic measure
Prophylactic measure

## 2022-02-10 NOTE — DISCHARGE NOTE NURSING/CASE MANAGEMENT/SOCIAL WORK - NSDCPEFALRISK_GEN_ALL_CORE
For information on Fall & Injury Prevention, visit: [email or web address] OR  [email or web address] OR  https://www.cdc.gov/steadi/patient.html

## 2022-02-10 NOTE — PROGRESS NOTE ADULT - ASSESSMENT
56F w/pmh cerebral palsy (non-verbal, moans, bedbound at baseline), profound intellectual disability, seizure d/o, constipation, multiple admissions for aspiration PNA, (Pseudomonas & MRSA in sputum) requiring intubation, dysphagia s/p PEG recent admission Jan 19 to Feb 1 for cough, hemoptysis (deemed likely a component of chronic aspiration of secretions), discharged for 2 days, returns back to [facility] for cc oral secretions requiring suctioning that is not available at her [facility].
56F w/pmh cerebral palsy (non-verbal, moans, bedbound at baseline), profound intellectual disability, seizure d/o, constipation, multiple admissions for aspiration PNA, (Pseudomonas & MRSA in sputum) requiring intubation, dysphagia s/p PEG recent admission Jan 19 to Feb 1 for cough, hemoptysis (deemed likely a component of chronic aspiration of secretions), discharged for 2 days, returns back to [facility] for cc oral secretions requiring suctioning that is not available at her nursing home.
56F w/pmh cerebral palsy (non-verbal, moans, bedbound at baseline), profound intellectual disability, seizure d/o, constipation, multiple admissions for aspiration PNA, (Pseudomonas & MRSA in sputum) requiring intubation, dysphagia s/p PEG recent admission Jan 19 to Feb 1 for cough, hemoptysis (deemed likely a component of chronic aspiration of secretions), discharged for 2 days, returns back to [facility] for cc oral secretions requiring suctioning that is not available at her snf.
56F w/pmh cerebral palsy (non-verbal, moans, bedbound at baseline), profound intellectual disability, seizure d/o, constipation, multiple admissions for aspiration PNA, (Pseudomonas & MRSA in sputum) requiring intubation, dysphagia s/p PEG recent admission Jan 19 to Feb 1 for cough, hemoptysis (deemed likely a component of chronic aspiration of secretions), discharged for 2 days, returns back to [facility] for cc oral secretions requiring suctioning that is not available at her [facility].
56F w/pmh cerebral palsy (non-verbal, moans, bedbound at baseline), profound intellectual disability, seizure d/o, constipation, multiple admissions for aspiration PNA, (Pseudomonas & MRSA in sputum) requiring intubation, dysphagia s/p PEG recent admission Jan 19 to Feb 1 for cough, hemoptysis (deemed likely a component of chronic aspiration of secretions), discharged for 2 days, returns back to [facility] for cc oral secretions requiring suctioning that is not available at her [facility].
56F w/pmh cerebral palsy (non-verbal, moans, bedbound at baseline), profound intellectual disability, seizure d/o, constipation, multiple admissions for aspiration PNA, (Pseudomonas & MRSA in sputum) requiring intubation, dysphagia s/p PEG recent admission Jan 19 to Feb 1 for cough, hemoptysis (deemed likely a component of chronic aspiration of secretions), discharged for 2 days, returns back to [facility] for cc oral secretions requiring suctioning that is not available at her [facility].

## 2022-02-10 NOTE — PROGRESS NOTE ADULT - PROBLEM SELECTOR PLAN 2
-likely due to components of chronic aspiration and atelectasis (chronic immobility)  -titrate supplemental O2 down/off prn  -CXR shows clear lungs  -chest PT  -no signs of sepsis/pneumonia, hold off on abx  -if worsening SpO2 will get CT chest  -c/w budesonide
-likely due to components of chronic aspiration and atelectasis (chronic immobility)  -titrate supplemental O2 down/off prn  -CXR shows clear lungs  -chest PT  -no signs of sepsis/pneumonia, hold off on abx  -if worsening SpO2 would get CT chest  -cont budesonide
-likely due to components of chronic aspiration and atelectasis (chronic immobility)  -sating 95% on 2L NC ; wean off as tolerated  -CXR shows clear lungs  -chest PT  -no signs of sepsis/pneumonia ; c/t hold off on abx  -c/w budesonide
-likely due to components of chronic aspiration and atelectasis (chronic immobility)  -titrate supplemental O2 down/off prn  -CXR shows clear lungs  -chest PT  -no signs of sepsis/pneumonia, hold off on abx  -if worsening SpO2 will get CT chest  -cont budesonide
-likely due to components of chronic aspiration and atelectasis (chronic immobility)  -sating 95% on 2L NC ; wean off as tolerated  -CXR shows clear lungs  -chest PT  -no signs of sepsis/pneumonia, hold off on abx  -if worsening SpO2 will get CT chest  -c/w budesonide
-likely due to components of chronic aspiration and atelectasis (chronic immobility)  -titrate supplemental O2 down/off prn  -CXR shows clear lungs  -chest PT  -no signs of sepsis/pneumonia, hold off on abx  -if worsening SpO2 will get CT chest  -c/w budesonide

## 2022-02-10 NOTE — PROGRESS NOTE ADULT - REASON FOR ADMISSION
Requirement for oral suctioning

## 2022-02-10 NOTE — PROGRESS NOTE ADULT - PROBLEM SELECTOR PLAN 3
-restart tube feeds- Jevity @ 55ml/hr x24h per last nutrition recommendation  -nutrition consult  -per nurse at [facility] patient used to take food by mouth but has not in a long time. She requested SLP consult however patient doesn't follow instructions, this will likely limit the exam.  -review of last SLP note in 2019 - patient failed swallow eval due to inability to follow commands, signs of severe dysphagia, and secretions w/need for suction
-c/w tube feeds- Jevity @ 55ml/hr x24h per last nutrition recommendation  -nutrition reccs noted  -cannot obtain SLP eval as patient doesn't follow instructions.  -review of last SLP note in 2019 ; patient failed swallow eval due to inability to follow commands, signs of severe dysphagia, and secretions w/need for suction
-restart tube feeds- Jevity @ 55ml/hr x24h per last nutrition recommendation  -nutrition consult  -per nurse at [facility] patient used to take food by mouth but has not in a long time. She requested SLP consult however patient doesn't follow instructions, this will likely limit the exam.  -review of last SLP note in 2019 - patient failed swallow eval due to inability to follow commands, signs of severe dysphagia, and secretions w/need for suction
-c/w tube feeds- Jevity @ 55ml/hr x24h per last nutrition recommendation  -nutrition consuted  -cannot obtain SLP eval as patient doesn't follow instructions.  -review of last SLP note in 2019 ; patient failed swallow eval due to inability to follow commands, signs of severe dysphagia, and secretions w/need for suction

## 2022-03-30 DIAGNOSIS — N39.0 URINARY TRACT INFECTION, SITE NOT SPECIFIED: ICD-10-CM

## 2022-03-31 NOTE — ED ADULT TRIAGE NOTE - PAIN: PRESENCE, MLM
non-verbal indicators of pain/discomfort absent Quality 402: Tobacco Use And Help With Quitting Among Adolescents: Patient screened for tobacco and never smoked Quality 130: Documentation Of Current Medications In The Medical Record: Current Medications Documented Quality 431: Preventive Care And Screening: Unhealthy Alcohol Use - Screening: Patient not identified as an unhealthy alcohol user when screened for unhealthy alcohol use using a systematic screening method Quality 226: Preventive Care And Screening: Tobacco Use: Screening And Cessation Intervention: Patient screened for tobacco use and is an ex/non-smoker Detail Level: Detailed

## 2022-04-19 ENCOUNTER — EMERGENCY (EMERGENCY)
Facility: HOSPITAL | Age: 57
LOS: 1 days | Discharge: HOME | End: 2022-04-19
Attending: STUDENT IN AN ORGANIZED HEALTH CARE EDUCATION/TRAINING PROGRAM | Admitting: STUDENT IN AN ORGANIZED HEALTH CARE EDUCATION/TRAINING PROGRAM
Payer: MEDICARE

## 2022-04-19 VITALS
DIASTOLIC BLOOD PRESSURE: 74 MMHG | HEART RATE: 100 BPM | SYSTOLIC BLOOD PRESSURE: 119 MMHG | HEIGHT: 60 IN | RESPIRATION RATE: 20 BRPM | OXYGEN SATURATION: 98 %

## 2022-04-19 VITALS — TEMPERATURE: 98 F | HEART RATE: 107 BPM

## 2022-04-19 DIAGNOSIS — Z93.1 GASTROSTOMY STATUS: Chronic | ICD-10-CM

## 2022-04-19 PROCEDURE — 99283 EMERGENCY DEPT VISIT LOW MDM: CPT | Mod: 25,GC

## 2022-04-19 PROCEDURE — 43762 RPLC GTUBE NO REVJ TRC: CPT | Mod: GC

## 2022-04-19 PROCEDURE — 74019 RADEX ABDOMEN 2 VIEWS: CPT | Mod: 26

## 2022-04-19 PROCEDURE — 71046 X-RAY EXAM CHEST 2 VIEWS: CPT | Mod: 26

## 2022-04-19 RX ORDER — VALPROIC ACID (AS SODIUM SALT) 250 MG/5ML
750 SOLUTION, ORAL ORAL ONCE
Refills: 0 | Status: DISCONTINUED | OUTPATIENT
Start: 2022-04-19 | End: 2022-04-19

## 2022-04-19 RX ORDER — LEVETIRACETAM 250 MG/1
600 TABLET, FILM COATED ORAL ONCE
Refills: 0 | Status: DISCONTINUED | OUTPATIENT
Start: 2022-04-19 | End: 2022-04-19

## 2022-04-19 RX ORDER — IPRATROPIUM/ALBUTEROL SULFATE 18-103MCG
3 AEROSOL WITH ADAPTER (GRAM) INHALATION
Refills: 0 | Status: COMPLETED | OUTPATIENT
Start: 2022-04-19 | End: 2022-04-19

## 2022-04-19 RX ORDER — LACOSAMIDE 50 MG/1
200 TABLET ORAL ONCE
Refills: 0 | Status: DISCONTINUED | OUTPATIENT
Start: 2022-04-19 | End: 2022-04-19

## 2022-04-19 RX ORDER — LEVETIRACETAM 250 MG/1
500 TABLET, FILM COATED ORAL ONCE
Refills: 0 | Status: COMPLETED | OUTPATIENT
Start: 2022-04-19 | End: 2022-04-19

## 2022-04-19 RX ORDER — VALPROIC ACID (AS SODIUM SALT) 250 MG/5ML
750 SOLUTION, ORAL ORAL ONCE
Refills: 0 | Status: COMPLETED | OUTPATIENT
Start: 2022-04-19 | End: 2022-04-19

## 2022-04-19 RX ADMIN — LACOSAMIDE 200 MILLIGRAM(S): 50 TABLET ORAL at 10:05

## 2022-04-19 RX ADMIN — Medication 3 MILLILITER(S): at 09:20

## 2022-04-19 RX ADMIN — Medication 3 MILLILITER(S): at 08:25

## 2022-04-19 RX ADMIN — Medication 750 MILLIGRAM(S): at 10:15

## 2022-04-19 RX ADMIN — LEVETIRACETAM 500 MILLIGRAM(S): 250 TABLET, FILM COATED ORAL at 10:04

## 2022-04-19 RX ADMIN — Medication 3 MILLILITER(S): at 08:57

## 2022-04-19 NOTE — ED ADULT TRIAGE NOTE - CHIEF COMPLAINT QUOTE
Pt BIBEMS from [facility] for G tube problem. Per EMS, facility went to give morning medications and found her G-tube as clogged. Arrives with audible wheezing noted, per EMS facility states this is her baseline. Pt non-verbal at baseline. Refusing oral temp in triage. Pmhx: Cerebral Palsy, asthma,

## 2022-04-19 NOTE — ED ADULT NURSE NOTE - OBJECTIVE STATEMENT
pt is a 56 yr old female with seizures/ CP/ developmental delay/ non-verbal. pt sent to ED for clogged PEG tube. tube changed and evaluated, ok to use. see emar for tx. pt treated for audible wheeze, as per records this is patients baseline resp status. pt agitated at times, comfort measures applied, distraction methods used.

## 2022-04-19 NOTE — ED PROCEDURE NOTE - CPROC ED TIME OUT STATEMENT1
“Patient's name, , procedure and correct site were confirmed during the [place] Timeout.”

## 2022-04-19 NOTE — PROVIDER CONTACT NOTE (OTHER) - ASSESSMENT
SARA has spoken to Josefina, House staff ([phone]). SARA confirmed pts mode of transportation is ambulance. SARA arranged ambulance via [facility] ([phone]), trip id: [id number], : 11:00am. No further SW needs at this time.

## 2022-04-19 NOTE — ED PROCEDURE NOTE - CPROC ED PHYSICIAN PRESENCE1
I was present during the key portion of the procedure.
Patient/Caregiver provided printed discharge information.

## 2022-04-19 NOTE — ED PROVIDER NOTE - ATTENDING CONTRIBUTION TO CARE
I have personally performed a face to face medical and diagnostic evaluation of the patient. I have discussed with and reviewed the Resident's note and agree with the History, ROS, Physical Exam and MDM unless otherwise indicated. A brief summary of my personal evaluation and impression can be found below.    55yo F hx CP, seizure, constipation, aspiration PNA, dypshagia s/p PEG p/w PEG malfunction this AM when attempted morning meds. Otherwise at baseline status. Of note per EMS, wheezes at baseline.  VITALS: Initial triage and subsequent vitals have been reviewed by me.  Gen: Well appearing, NAD, non-toxic  Head: NCAT  HEENT: MMM, normal conjunctiva, anicteric, neck supple  Lung: scant exp wheezing b/l, no adventitious sounds  CV: RRR, no murmurs, 2+symmetric peripheral pulses  Abd: soft, NTND, no rebound or guarding, no palpable masses, open stoma not infected appearing  MSK: No edema, no visible deformities  Neuro: at baseline mental and functional status  Skin: Warm and dry, no evidence of rash or erythema  PEG malfunction, will replace and confirm placement w/ xray. No signs/symptoms of infection or other underlying process

## 2022-04-19 NOTE — ED PROCEDURE NOTE - PROCEDURE ADDITIONAL DETAILS
Aristeo PGY3: 18F PEG clogged, unable to remove obstruction. Replaced w/ 16F w/o difficulty (no 18F in dept) and flushed.

## 2022-04-19 NOTE — ED PROVIDER NOTE - PATIENT PORTAL LINK FT
You can access the FollowMyHealth Patient Portal offered by [facility] by registering at the following website: [email or web address]. By joining [facility]’s FollowMyHealth portal, you will also be able to view your health information using other applications (apps) compatible with our system.

## 2022-04-19 NOTE — ED PROVIDER NOTE - NSFOLLOWUPINSTRUCTIONS_ED_ALL_ED_FT
Please follow up with your primary care provider for further concerns you may have regarding your general health. Attached you will find your results from today's visit. Continue taking your medications as prescribed and keep your upcoming medical appointments.    You were given your AM keppra, vimpat, and depakote in the ED and your PEG tube was exchanged and is functional.

## 2022-04-19 NOTE — ED PROVIDER NOTE - PHYSICAL EXAMINATION
Gen: WDWN, NAD  HEENT: EOMI, no nasal discharge, mucous membranes moist  CV: RRR, +S1/S2, no M/R/G  Resp: scant b/l exp wheezing  GI: Abdomen soft non-distended, NTTP, no masses, PEG clogged, open stoma not infected appearing  MSK: No open wounds, no bruising, no LE edema  Neuro: at baseline mental status

## 2022-04-19 NOTE — ED PROVIDER NOTE - CLINICAL SUMMARY MEDICAL DECISION MAKING FREE TEXT BOX
Aristeo PGY3: 56F hx as above sent in by care facility for PEG tube malfunction. Plan to replace PEG, eval via XR. Pt notably wheezy although not hypoxic, seems c/w baseline status given standing nebs at facility. VSS, very low susp for aspiration PNA however given hx will eval w/ CXR, trial nebs, reass. Anticipate dc.

## 2022-04-19 NOTE — ED PROVIDER NOTE - PROGRESS NOTE DETAILS
Aristeo PGY3: peg tube exchanged, replaced 18F w/ 16F 2/2 lack of 18F in ED. + return of gastric contents. Plan for PEG study, nebs, home seizure meds, dc. Aristeo PGY3: PEG appears in right place. pt resp status much improved after nebs. XR appears unchanged from prior. Discussed results w/ nurse at pt's facility, pt scheduled for pickup at 11 am.

## 2022-04-19 NOTE — PROVIDER CONTACT NOTE (OTHER) - BACKGROUND
Per CARON Gusman, pt is cleared for d/c. Pt is from [facility] ([address] [place] [address]).

## 2022-04-19 NOTE — ED PROVIDER NOTE - OBJECTIVE STATEMENT
56F hx CP, seizures, constipation, aspiration PNA, dysphagia s/p PEG sent in by [facility] for PEG malfunction. Per facility pt's PEG clogged and has stopped working. Pt nonverbal at baseline. Pt wheezy however per EMS this was noted as patient's baseline and she is on standing nebs at facility. Pt afebrile, VSS.

## 2022-04-29 RX ORDER — SULFAMETHOXAZOLE AND TRIMETHOPRIM 200; 40 MG/5ML; MG/5ML
200-40 SUSPENSION ORAL
Qty: 300 | Refills: 5 | Status: ACTIVE | OUTPATIENT
Start: 2020-12-09

## 2022-04-30 ENCOUNTER — INPATIENT (INPATIENT)
Facility: HOSPITAL | Age: 57
LOS: 5 days | Discharge: NURSING, REHAB OR LONG-TERM CARE | DRG: 393 | End: 2022-05-06
Attending: HOSPITALIST | Admitting: STUDENT IN AN ORGANIZED HEALTH CARE EDUCATION/TRAINING PROGRAM
Payer: MEDICARE

## 2022-04-30 VITALS
SYSTOLIC BLOOD PRESSURE: 140 MMHG | HEIGHT: 60 IN | OXYGEN SATURATION: 98 % | HEART RATE: 84 BPM | DIASTOLIC BLOOD PRESSURE: 76 MMHG | RESPIRATION RATE: 16 BRPM | TEMPERATURE: 98 F

## 2022-04-30 DIAGNOSIS — K94.23 GASTROSTOMY MALFUNCTION: ICD-10-CM

## 2022-04-30 DIAGNOSIS — Z29.9 ENCOUNTER FOR PROPHYLACTIC MEASURES, UNSPECIFIED: ICD-10-CM

## 2022-04-30 DIAGNOSIS — G80.9 CEREBRAL PALSY, UNSPECIFIED: ICD-10-CM

## 2022-04-30 DIAGNOSIS — Z93.1 GASTROSTOMY STATUS: Chronic | ICD-10-CM

## 2022-04-30 DIAGNOSIS — R09.89 OTHER SPECIFIED SYMPTOMS AND SIGNS INVOLVING THE CIRCULATORY AND RESPIRATORY SYSTEMS: ICD-10-CM

## 2022-04-30 DIAGNOSIS — J69.0 PNEUMONITIS DUE TO INHALATION OF FOOD AND VOMIT: ICD-10-CM

## 2022-04-30 DIAGNOSIS — R56.9 UNSPECIFIED CONVULSIONS: ICD-10-CM

## 2022-04-30 LAB
ALBUMIN SERPL ELPH-MCNC: 2.7 G/DL — LOW (ref 3.3–5)
ALP SERPL-CCNC: 77 U/L — SIGNIFICANT CHANGE UP (ref 40–120)
ALT FLD-CCNC: 22 U/L — SIGNIFICANT CHANGE UP (ref 10–45)
ANION GAP SERPL CALC-SCNC: 10 MMOL/L — SIGNIFICANT CHANGE UP (ref 5–17)
AST SERPL-CCNC: 41 U/L — HIGH (ref 10–40)
BILIRUB SERPL-MCNC: 0.2 MG/DL — SIGNIFICANT CHANGE UP (ref 0.2–1.2)
BUN SERPL-MCNC: 25 MG/DL — HIGH (ref 7–23)
CALCIUM SERPL-MCNC: 8.9 MG/DL — SIGNIFICANT CHANGE UP (ref 8.4–10.5)
CHLORIDE SERPL-SCNC: 98 MMOL/L — SIGNIFICANT CHANGE UP (ref 96–108)
CO2 SERPL-SCNC: 27 MMOL/L — SIGNIFICANT CHANGE UP (ref 22–31)
CREAT SERPL-MCNC: 0.49 MG/DL — LOW (ref 0.5–1.3)
EGFR: 111 ML/MIN/1.73M2 — SIGNIFICANT CHANGE UP
GLUCOSE SERPL-MCNC: 85 MG/DL — SIGNIFICANT CHANGE UP (ref 70–99)
HCT VFR BLD CALC: 42.8 % — SIGNIFICANT CHANGE UP (ref 34.5–45)
HGB BLD-MCNC: 13.8 G/DL — SIGNIFICANT CHANGE UP (ref 11.5–15.5)
MCHC RBC-ENTMCNC: 32.2 GM/DL — SIGNIFICANT CHANGE UP (ref 32–36)
MCHC RBC-ENTMCNC: 34.8 PG — HIGH (ref 27–34)
MCV RBC AUTO: 108.1 FL — HIGH (ref 80–100)
PLATELET # BLD AUTO: 160 K/UL — SIGNIFICANT CHANGE UP (ref 150–400)
POTASSIUM SERPL-MCNC: 4.7 MMOL/L — SIGNIFICANT CHANGE UP (ref 3.5–5.3)
POTASSIUM SERPL-SCNC: 4.7 MMOL/L — SIGNIFICANT CHANGE UP (ref 3.5–5.3)
PROT SERPL-MCNC: 7.1 G/DL — SIGNIFICANT CHANGE UP (ref 6–8.3)
RBC # BLD: 3.96 M/UL — SIGNIFICANT CHANGE UP (ref 3.8–5.2)
RBC # FLD: 13 % — SIGNIFICANT CHANGE UP (ref 10.3–14.5)
SODIUM SERPL-SCNC: 135 MMOL/L — SIGNIFICANT CHANGE UP (ref 135–145)
WBC # BLD: 8.19 K/UL — SIGNIFICANT CHANGE UP (ref 3.8–10.5)
WBC # FLD AUTO: 8.19 K/UL — SIGNIFICANT CHANGE UP (ref 3.8–10.5)

## 2022-04-30 PROCEDURE — 93010 ELECTROCARDIOGRAM REPORT: CPT

## 2022-04-30 PROCEDURE — 99223 1ST HOSP IP/OBS HIGH 75: CPT

## 2022-04-30 PROCEDURE — 99285 EMERGENCY DEPT VISIT HI MDM: CPT | Mod: GC

## 2022-04-30 PROCEDURE — 74019 RADEX ABDOMEN 2 VIEWS: CPT | Mod: 26

## 2022-04-30 RX ORDER — VALPROIC ACID (AS SODIUM SALT) 250 MG/5ML
750 SOLUTION, ORAL ORAL THREE TIMES A DAY
Refills: 0 | Status: DISCONTINUED | OUTPATIENT
Start: 2022-04-30 | End: 2022-05-06

## 2022-04-30 RX ORDER — LEVETIRACETAM 250 MG/1
500 TABLET, FILM COATED ORAL EVERY 12 HOURS
Refills: 0 | Status: DISCONTINUED | OUTPATIENT
Start: 2022-04-30 | End: 2022-05-06

## 2022-04-30 RX ORDER — SODIUM CHLORIDE 9 MG/ML
1000 INJECTION, SOLUTION INTRAVENOUS
Refills: 0 | Status: DISCONTINUED | OUTPATIENT
Start: 2022-04-30 | End: 2022-04-30

## 2022-04-30 RX ORDER — SODIUM CHLORIDE 9 MG/ML
1000 INJECTION INTRAMUSCULAR; INTRAVENOUS; SUBCUTANEOUS
Refills: 0 | Status: DISCONTINUED | OUTPATIENT
Start: 2022-04-30 | End: 2022-05-02

## 2022-04-30 RX ORDER — LACOSAMIDE 50 MG/1
200 TABLET ORAL
Refills: 0 | Status: DISCONTINUED | OUTPATIENT
Start: 2022-04-30 | End: 2022-05-06

## 2022-04-30 RX ADMIN — SODIUM CHLORIDE 75 MILLILITER(S): 9 INJECTION INTRAMUSCULAR; INTRAVENOUS; SUBCUTANEOUS at 23:00

## 2022-04-30 NOTE — ED PROVIDER NOTE - ATTENDING CONTRIBUTION TO CARE
RGUJRAL 58yo f hx listed sent in for PEG tube being clogged that occurred today. Pt is non verbal at baseline. On exam, abdomen is soft no obvious discomfort w palpation, pt moans as you approach her in general. PEG site mild erythema no discharge. Attempted to deflate balloon unable to deflate. Noted clog in peg tube. Will have to admit for IR replacement. Labs sent and spoke to IR for consult.

## 2022-04-30 NOTE — H&P ADULT - PROBLEM SELECTOR PLAN 2
-Normal feeds are Jevity 1.5 ELIZABETH 4 cans to run at 50ccs/hr for 18 hours for total 900ccs. Flush 25/hr for 18hrs, total 450. Flush with 60ccs water before and after. Nutrition consult  -Wound care for Right Ankle Non-verbal, moans, bedbound at baseline, profound intellectual disability    -NPO for now  -Normal feeds are Jevity 1.5 ELIZABETH 4 cans to run at 50ccs/hr for 18 hours for total 900ccs. Flush 25/hr for 18hrs, total 450. Flush with 60ccs water before and after. Nutrition consult  -Wound care for right ankle  -SW consult when ready for discharge

## 2022-04-30 NOTE — ED PROVIDER NOTE - CLINICAL SUMMARY MEDICAL DECISION MAKING FREE TEXT BOX
57y F w/ PMHx of CP, seizures, constipation, aspiration PNA, dysphagia s/p PEG sent in by [facility] for clogged PEG tube that occurred today, received her anti-epileptic medication through her PEG prior to clogging. Unable to flush tube, will need to replace. No erythema or discharge around PEG site, VS WNL.

## 2022-04-30 NOTE — ED ADULT NURSE NOTE - OBJECTIVE STATEMENT
Pt 58 y/o female, AxOx0 nonverbal at baseline, presents to ED from [facility] for clogged G tube. PMH of  CP, seizures/ developmental delays. Pt does not follow commands or answer questions verbally at baseline. G tube noted to md abdomen and unable to be flushed by RN and MD. Skin breakdown noted around stoma. No nonverbal indicators of pain noted. Skin on backside clean dry and intact. Safety and comfort measures initiated- bed placed in lowest position and side rails raised. Pt oriented to call bell system.

## 2022-04-30 NOTE — ED PROVIDER NOTE - PROGRESS NOTE DETAILS
Denny KAUFMAN (PGY-3): Spoke with IR resident, will try to get on schedule tomorrow for PEG tube replacement. TBA medicine.

## 2022-04-30 NOTE — H&P ADULT - PROBLEM SELECTOR PLAN 3
Need to replace PEG tube. Will cont. doses as IV for now as able. Need to replace PEG tube. Will cont. doses as IV for now as able.  -Cont. Keppra 600mg IVPB BID  -Cont. Vimpat IVPB TID  -Cont. valproic acid IVPB TID  -seizure precautions

## 2022-04-30 NOTE — ED PROVIDER NOTE - EMPLOYMENT
Caller: coreyduane    Relationship: Mother    Best call back number: [phone]     What specialty or service is being requested: ENT    What is the provider, practice or medical service name: DR. BERNARDO LLOYD    What is the office location: UNKNOWN    What is the office phone number: UNKNOWN    Any additional details: PATIENT HAD A REFERRAL THAT HAS  AND THE PROVIDER IS REQUESTING A NEW REFERRAL BE PUT IN.           N/A

## 2022-04-30 NOTE — H&P ADULT - NSHPPHYSICALEXAM_GEN_ALL_CORE
Vital Signs Last 24 Hrs  T(C): 37 (04-30-22 @ 20:09), Max: 37 (04-30-22 @ 20:09)  T(F): 98.6 (04-30-22 @ 20:09), Max: 98.6 (04-30-22 @ 20:09)  HR: 89 (04-30-22 @ 22:48) (84 - 89)  BP: 146/77 (04-30-22 @ 22:48) (140/76 - 169/86)  BP(mean): --  RR: 18 (04-30-22 @ 22:48) (16 - 18)  SpO2: 95% (04-30-22 @ 22:48) (95% - 99%)

## 2022-04-30 NOTE — H&P ADULT - PROBLEM SELECTOR PLAN 1
Unable to flush PEG tube or deflate balloon in ER. Reportedly IR notified and will try to place pt on schedule for tomorrow.  -NPO  -F/u IR recommendations  -Needs calcium carbonate prescription converted to liquid on discharge Unable to flush PEG tube or deflate balloon in ER. Reportedly IR notified and will try to place pt on schedule for tomorrow.  -NPO  -F/u IR recommendations  -Needs calcium carbonate prescription converted to liquid on discharge  -Can also consider GI consult  -Resume PO meds via PEG as able

## 2022-04-30 NOTE — H&P ADULT - PROBLEM SELECTOR PLAN 4
Issues with aspiration during admissions earlier this year and excessive secretions. Unclear if prior degree of treatment still required.  -Cont. q4hr oral suctions for now and down titrate frequency as needed  -Cont. Nebs q4 and 3% hypertonic saline 4ml BID for now, see above  -Do not see Scopolamine patch in med rec of attached paperwork Issues with aspiration during admissions earlier this year and excessive secretions. Unclear if prior degree of treatment still required.  -Cont. q4hr oral suctions for now and down titrate frequency as needed  -Cont. Nebs q6 and 3% hypertonic saline 4ml BID for now, see above  -Do not see Scopolamine patch in med rec of attached paperwork

## 2022-04-30 NOTE — H&P ADULT - ASSESSMENT
56F w/ pmhx of cerebral palsy (non-verbal, moans, bedbound at baseline), profound intellectual disability, seizure d/o, constipation, multiple admissions for aspiration PNA, (Pseudomonas & MRSA in sputum) requiring intubation, dysphagia s/p PEG w/ 2 recent admissions for issues related with aspiration p/w PEG tube malfunction

## 2022-04-30 NOTE — ED PROVIDER NOTE - OBJECTIVE STATEMENT
57y F w/ PMHx of CP, seizures, constipation, aspiration PNA, dysphagia s/p PEG sent in by [facility] for clogged PEG tube that occurred today, received her anti-epileptic medication through her PEG prior to clogging. Pt nonverbal at baseline. Patient awake, alert. Facility requesting new script for calcium carb liquid to replace chewables.

## 2022-04-30 NOTE — ED ADULT NURSE NOTE - IS THE PATIENT ABLE TO BE SCREENED?
----- Message from Elayne Andrews RN sent at 1/25/2016  2:45 PM CST -----  Regarding: f/u colon  Colonosocopy 1/18/2016--polyp  F/u colon in 5 years   No

## 2022-04-30 NOTE — ED PROVIDER NOTE - PHYSICAL EXAMINATION
Gen: WDWN, NAD  HEENT: EOMI, no nasal discharge, mucous membranes moist  CV: RRR, +S1/S2, no M/R/G  Resp: CTAB, +mild upper airway grunting   GI: Abdomen soft non-distended, NTTP, no masses, PEG clogged, open stoma not infected appearing  MSK: No open wounds, no bruising, no LE edema  Neuro: at baseline mental status

## 2022-04-30 NOTE — H&P ADULT - GASTROINTESTINAL COMMENTS
L PEG tube in place, slight irritation at ostomy site. Note white power like substance completely obstructing interior

## 2022-04-30 NOTE — H&P ADULT - HISTORY OF PRESENT ILLNESS
56F w/ pmhx of cerebral palsy (non-verbal, moans, bedbound at baseline), profound intellectual disability, seizure d/o, constipation, multiple admissions for aspiration PNA, (Pseudomonas & MRSA in sputum) requiring intubation, dysphagia s/p PEG w/ 2 recent admissions for issues related with aspiration p/w PEG tube malfunction. Sent in by [facility] options for clogged tube earlier today. Got anti-epileptics in AM prior to clogging. Family also requesting new Rx for liquid form of calcium carbonate to prevent clogging. ER states they were also unable to deflate balloon. Tube also could not be flushed. As per documentation, ED team spoke to IR resident, will try to get on schedule.     In ER: Given LR 56F w/ pmhx of cerebral palsy (non-verbal, moans, bedbound at baseline), profound intellectual disability, seizure d/o, constipation, multiple admissions for aspiration PNA, (Pseudomonas & MRSA in sputum) requiring intubation, dysphagia s/p PEG w/ 2 recent admissions for issues related with aspiration p/w PEG tube malfunction. Sent in by [facility] options for clogged tube earlier today. Got anti-epileptics in AM prior to clogging. Family also requesting new Rx for liquid form of calcium carbonate to prevent clogging. ER states they were also unable to deflate balloon. Tube also could not be flushed. As per documentation, ED team spoke to IR resident, will try to get on schedule.     In ER: Given LR

## 2022-05-01 DIAGNOSIS — K59.00 CONSTIPATION, UNSPECIFIED: ICD-10-CM

## 2022-05-01 LAB
ALBUMIN SERPL ELPH-MCNC: 2.7 G/DL — LOW (ref 3.3–5)
ALP SERPL-CCNC: 78 U/L — SIGNIFICANT CHANGE UP (ref 40–120)
ALT FLD-CCNC: 23 U/L — SIGNIFICANT CHANGE UP (ref 10–45)
ANION GAP SERPL CALC-SCNC: 12 MMOL/L — SIGNIFICANT CHANGE UP (ref 5–17)
ANISOCYTOSIS BLD QL: SLIGHT — SIGNIFICANT CHANGE UP
APTT BLD: 33.6 SEC — SIGNIFICANT CHANGE UP (ref 27.5–35.5)
AST SERPL-CCNC: 40 U/L — SIGNIFICANT CHANGE UP (ref 10–40)
BASOPHILS # BLD AUTO: 0.01 K/UL — SIGNIFICANT CHANGE UP (ref 0–0.2)
BASOPHILS # BLD AUTO: 0.07 K/UL — SIGNIFICANT CHANGE UP (ref 0–0.2)
BASOPHILS NFR BLD AUTO: 0.1 % — SIGNIFICANT CHANGE UP (ref 0–2)
BASOPHILS NFR BLD AUTO: 0.9 % — SIGNIFICANT CHANGE UP (ref 0–2)
BILIRUB SERPL-MCNC: 0.3 MG/DL — SIGNIFICANT CHANGE UP (ref 0.2–1.2)
BUN SERPL-MCNC: 24 MG/DL — HIGH (ref 7–23)
CALCIUM SERPL-MCNC: 8.8 MG/DL — SIGNIFICANT CHANGE UP (ref 8.4–10.5)
CHLORIDE SERPL-SCNC: 97 MMOL/L — SIGNIFICANT CHANGE UP (ref 96–108)
CO2 SERPL-SCNC: 26 MMOL/L — SIGNIFICANT CHANGE UP (ref 22–31)
CREAT SERPL-MCNC: 0.47 MG/DL — LOW (ref 0.5–1.3)
EGFR: 112 ML/MIN/1.73M2 — SIGNIFICANT CHANGE UP
EOSINOPHIL # BLD AUTO: 0.04 K/UL — SIGNIFICANT CHANGE UP (ref 0–0.5)
EOSINOPHIL # BLD AUTO: 0.07 K/UL — SIGNIFICANT CHANGE UP (ref 0–0.5)
EOSINOPHIL NFR BLD AUTO: 0.6 % — SIGNIFICANT CHANGE UP (ref 0–6)
EOSINOPHIL NFR BLD AUTO: 0.9 % — SIGNIFICANT CHANGE UP (ref 0–6)
GLUCOSE BLDC GLUCOMTR-MCNC: 84 MG/DL — SIGNIFICANT CHANGE UP (ref 70–99)
GLUCOSE BLDC GLUCOMTR-MCNC: 86 MG/DL — SIGNIFICANT CHANGE UP (ref 70–99)
GLUCOSE BLDC GLUCOMTR-MCNC: 90 MG/DL — SIGNIFICANT CHANGE UP (ref 70–99)
GLUCOSE SERPL-MCNC: 79 MG/DL — SIGNIFICANT CHANGE UP (ref 70–99)
HCT VFR BLD CALC: 44.6 % — SIGNIFICANT CHANGE UP (ref 34.5–45)
HGB BLD-MCNC: 14.4 G/DL — SIGNIFICANT CHANGE UP (ref 11.5–15.5)
IMM GRANULOCYTES NFR BLD AUTO: 0.7 % — SIGNIFICANT CHANGE UP (ref 0–1.5)
INR BLD: 1.02 RATIO — SIGNIFICANT CHANGE UP (ref 0.88–1.16)
LYMPHOCYTES # BLD AUTO: 1.7 K/UL — SIGNIFICANT CHANGE UP (ref 1–3.3)
LYMPHOCYTES # BLD AUTO: 1.76 K/UL — SIGNIFICANT CHANGE UP (ref 1–3.3)
LYMPHOCYTES # BLD AUTO: 20.7 % — SIGNIFICANT CHANGE UP (ref 13–44)
LYMPHOCYTES # BLD AUTO: 25.7 % — SIGNIFICANT CHANGE UP (ref 13–44)
MACROCYTES BLD QL: SIGNIFICANT CHANGE UP
MAGNESIUM SERPL-MCNC: 1.7 MG/DL — SIGNIFICANT CHANGE UP (ref 1.6–2.6)
MANUAL SMEAR VERIFICATION: SIGNIFICANT CHANGE UP
MCHC RBC-ENTMCNC: 32.3 GM/DL — SIGNIFICANT CHANGE UP (ref 32–36)
MCHC RBC-ENTMCNC: 34.8 PG — HIGH (ref 27–34)
MCV RBC AUTO: 107.7 FL — HIGH (ref 80–100)
MONOCYTES # BLD AUTO: 0.52 K/UL — SIGNIFICANT CHANGE UP (ref 0–0.9)
MONOCYTES # BLD AUTO: 0.91 K/UL — HIGH (ref 0–0.9)
MONOCYTES NFR BLD AUTO: 13.3 % — SIGNIFICANT CHANGE UP (ref 2–14)
MONOCYTES NFR BLD AUTO: 6.3 % — SIGNIFICANT CHANGE UP (ref 2–14)
NEUTROPHILS # BLD AUTO: 4.07 K/UL — SIGNIFICANT CHANGE UP (ref 1.8–7.4)
NEUTROPHILS # BLD AUTO: 5.83 K/UL — SIGNIFICANT CHANGE UP (ref 1.8–7.4)
NEUTROPHILS NFR BLD AUTO: 59.6 % — SIGNIFICANT CHANGE UP (ref 43–77)
NEUTROPHILS NFR BLD AUTO: 71.2 % — SIGNIFICANT CHANGE UP (ref 43–77)
NRBC # BLD: 0 /100 WBCS — SIGNIFICANT CHANGE UP (ref 0–0)
PLAT MORPH BLD: NORMAL — SIGNIFICANT CHANGE UP
PLATELET # BLD AUTO: 163 K/UL — SIGNIFICANT CHANGE UP (ref 150–400)
POTASSIUM SERPL-MCNC: 4.7 MMOL/L — SIGNIFICANT CHANGE UP (ref 3.5–5.3)
POTASSIUM SERPL-SCNC: 4.7 MMOL/L — SIGNIFICANT CHANGE UP (ref 3.5–5.3)
PROT SERPL-MCNC: 7.2 G/DL — SIGNIFICANT CHANGE UP (ref 6–8.3)
PROTHROM AB SERPL-ACNC: 11.8 SEC — SIGNIFICANT CHANGE UP (ref 10.5–13.4)
RBC # BLD: 4.14 M/UL — SIGNIFICANT CHANGE UP (ref 3.8–5.2)
RBC # FLD: 13.1 % — SIGNIFICANT CHANGE UP (ref 10.3–14.5)
RBC BLD AUTO: ABNORMAL
SARS-COV-2 RNA SPEC QL NAA+PROBE: SIGNIFICANT CHANGE UP
SODIUM SERPL-SCNC: 135 MMOL/L — SIGNIFICANT CHANGE UP (ref 135–145)
WBC # BLD: 6.84 K/UL — SIGNIFICANT CHANGE UP (ref 3.8–10.5)
WBC # FLD AUTO: 6.84 K/UL — SIGNIFICANT CHANGE UP (ref 3.8–10.5)

## 2022-05-01 PROCEDURE — 71045 X-RAY EXAM CHEST 1 VIEW: CPT | Mod: 26

## 2022-05-01 PROCEDURE — 99232 SBSQ HOSP IP/OBS MODERATE 35: CPT

## 2022-05-01 RX ORDER — ENOXAPARIN SODIUM 100 MG/ML
40 INJECTION SUBCUTANEOUS EVERY 24 HOURS
Refills: 0 | Status: DISCONTINUED | OUTPATIENT
Start: 2022-05-01 | End: 2022-05-06

## 2022-05-01 RX ORDER — IPRATROPIUM/ALBUTEROL SULFATE 18-103MCG
3 AEROSOL WITH ADAPTER (GRAM) INHALATION EVERY 6 HOURS
Refills: 0 | Status: DISCONTINUED | OUTPATIENT
Start: 2022-05-01 | End: 2022-05-06

## 2022-05-01 RX ORDER — SALICYLIC ACID 0.5 %
1 CLEANSER (GRAM) TOPICAL DAILY
Refills: 0 | Status: DISCONTINUED | OUTPATIENT
Start: 2022-05-01 | End: 2022-05-06

## 2022-05-01 RX ORDER — BUDESONIDE, MICRONIZED 100 %
0.5 POWDER (GRAM) MISCELLANEOUS
Refills: 0 | Status: DISCONTINUED | OUTPATIENT
Start: 2022-05-01 | End: 2022-05-06

## 2022-05-01 RX ORDER — ACETAMINOPHEN 500 MG
1000 TABLET ORAL ONCE
Refills: 0 | Status: COMPLETED | OUTPATIENT
Start: 2022-05-01 | End: 2022-05-01

## 2022-05-01 RX ORDER — ONDANSETRON 8 MG/1
4 TABLET, FILM COATED ORAL EVERY 8 HOURS
Refills: 0 | Status: DISCONTINUED | OUTPATIENT
Start: 2022-05-01 | End: 2022-05-06

## 2022-05-01 RX ORDER — SODIUM CHLORIDE 9 MG/ML
4 INJECTION INTRAMUSCULAR; INTRAVENOUS; SUBCUTANEOUS EVERY 12 HOURS
Refills: 0 | Status: DISCONTINUED | OUTPATIENT
Start: 2022-05-01 | End: 2022-05-06

## 2022-05-01 RX ADMIN — LEVETIRACETAM 400 MILLIGRAM(S): 250 TABLET, FILM COATED ORAL at 18:24

## 2022-05-01 RX ADMIN — Medication 3 MILLILITER(S): at 05:38

## 2022-05-01 RX ADMIN — LACOSAMIDE 140 MILLIGRAM(S): 50 TABLET ORAL at 15:11

## 2022-05-01 RX ADMIN — Medication 1000 MILLIGRAM(S): at 12:50

## 2022-05-01 RX ADMIN — SODIUM CHLORIDE 4 MILLILITER(S): 9 INJECTION INTRAMUSCULAR; INTRAVENOUS; SUBCUTANEOUS at 19:50

## 2022-05-01 RX ADMIN — SODIUM CHLORIDE 75 MILLILITER(S): 9 INJECTION INTRAMUSCULAR; INTRAVENOUS; SUBCUTANEOUS at 12:08

## 2022-05-01 RX ADMIN — Medication 3 MILLILITER(S): at 19:49

## 2022-05-01 RX ADMIN — Medication 50 MILLIGRAM(S): at 06:28

## 2022-05-01 RX ADMIN — LEVETIRACETAM 400 MILLIGRAM(S): 250 TABLET, FILM COATED ORAL at 05:38

## 2022-05-01 RX ADMIN — Medication 3 MILLILITER(S): at 11:14

## 2022-05-01 RX ADMIN — SODIUM CHLORIDE 4 MILLILITER(S): 9 INJECTION INTRAMUSCULAR; INTRAVENOUS; SUBCUTANEOUS at 07:57

## 2022-05-01 RX ADMIN — LACOSAMIDE 140 MILLIGRAM(S): 50 TABLET ORAL at 22:09

## 2022-05-01 RX ADMIN — Medication 0.5 MILLIGRAM(S): at 06:51

## 2022-05-01 RX ADMIN — LACOSAMIDE 140 MILLIGRAM(S): 50 TABLET ORAL at 07:58

## 2022-05-01 RX ADMIN — Medication 50 MILLIGRAM(S): at 15:45

## 2022-05-01 RX ADMIN — SODIUM CHLORIDE 75 MILLILITER(S): 9 INJECTION INTRAMUSCULAR; INTRAVENOUS; SUBCUTANEOUS at 22:11

## 2022-05-01 RX ADMIN — Medication 50 MILLIGRAM(S): at 22:10

## 2022-05-01 RX ADMIN — Medication 1 APPLICATION(S): at 11:32

## 2022-05-01 RX ADMIN — ENOXAPARIN SODIUM 40 MILLIGRAM(S): 100 INJECTION SUBCUTANEOUS at 11:14

## 2022-05-01 RX ADMIN — Medication 0.5 MILLIGRAM(S): at 19:50

## 2022-05-01 RX ADMIN — Medication 400 MILLIGRAM(S): at 12:08

## 2022-05-01 RX ADMIN — Medication 3 MILLILITER(S): at 23:31

## 2022-05-01 NOTE — PROGRESS NOTE ADULT - PROBLEM SELECTOR PLAN 2
Non-verbal, moans, bedbound at baseline, profound intellectual disability    -NPO for now  -Normal feeds are Jevity 1.5 ELIZABETH 4 cans to run at 50ccs/hr for 18 hours for total 900ccs. Flush 25/hr for 18hrs, total 450. Flush with 60ccs water before and after. Nutrition consult  -Wound care for right ankle  -SW consult when ready for discharge

## 2022-05-01 NOTE — PROGRESS NOTE ADULT - SUBJECTIVE AND OBJECTIVE BOX
Patient is a 57y old  Female who presents with a chief complaint of Clogged PEG tube (30 Apr 2022 23:11)      SUBJECTIVE / OVERNIGHT EVENTS: No On events, nonverbal could not get hx.         ADDITIONAL REVIEW OF SYSTEMS: Negative except for above    MEDICATIONS  (STANDING):  albuterol/ipratropium for Nebulization 3 milliLiter(s) Nebulizer every 6 hours  buDESOnide    Inhalation Suspension 0.5 milliGRAM(s) Inhalation two times a day  enoxaparin Injectable 40 milliGRAM(s) SubCutaneous every 24 hours  lacosamide IVPB 200 milliGRAM(s) IV Intermittent <User Schedule>  levETIRAcetam  IVPB 500 milliGRAM(s) IV Intermittent every 12 hours  sodium chloride 0.9%. 1000 milliLiter(s) (75 mL/Hr) IV Continuous <Continuous>  sodium chloride 3%  Inhalation 4 milliLiter(s) Inhalation every 12 hours  valproate sodium IVPB 750 milliGRAM(s) IV Intermittent three times a day  vitamin A &amp; D Ointment 1 Application(s) Topical daily    MEDICATIONS  (PRN):  ondansetron Injectable 4 milliGRAM(s) IV Push every 8 hours PRN Nausea and/or Vomiting      CAPILLARY BLOOD GLUCOSE        I&O's Summary      PHYSICAL EXAM:  Vital Signs Last 24 Hrs  T(C): 36.2 (01 May 2022 07:35), Max: 37 (30 Apr 2022 20:09)  T(F): 97.2 (01 May 2022 07:35), Max: 98.6 (30 Apr 2022 20:09)  HR: 113 (01 May 2022 07:35) (65 - 113)  BP: 164/96 (01 May 2022 07:35) (140/76 - 169/86)  BP(mean): --  RR: 18 (01 May 2022 07:35) (16 - 18)  SpO2: 95% (01 May 2022 07:35) (95% - 99%)    PHYSICAL EXAM:  GENERAL: NAD, well-developed  HEAD:  Atraumatic, Normocephalic  EYES:  conjunctiva and sclera clear  NECK: Supple, No JVD  CHEST/LUNG: Clear to auscultation bilaterally; No wheeze  HEART: Regular rate and rhythm; No murmurs, rubs, or gallops  ABDOMEN: Soft, Nontender, Nondistended; Bowel sounds present. PEG in place, No surrounding warmth, erythema or fluctuance   EXTREMITIES:  2+ Peripheral Pulses, No clubbing, cyanosis, or edema  PSYCH: AAOx0  NEUROLOGY: non-focal  SKIN: No rashes or lesions      LABS:                        14.4   6.84  )-----------( 163      ( 01 May 2022 06:08 )             44.6     05-01    135  |  97  |  24<H>  ----------------------------<  79  4.7   |  26  |  0.47<L>    Ca    8.8      01 May 2022 06:08  Mg     1.7     05-01    TPro  7.2  /  Alb  2.7<L>  /  TBili  0.3  /  DBili  x   /  AST  40  /  ALT  23  /  AlkPhos  78  05-01    PT/INR - ( 01 May 2022 06:08 )   PT: 11.8 sec;   INR: 1.02 ratio         PTT - ( 01 May 2022 06:08 )  PTT:33.6 sec            RADIOLOGY & ADDITIONAL TESTS:    Imaging Personally Reviewed:    Electrocardiogram Personally Reviewed:    COORDINATION OF CARE:  Care Discussed with Consultants/Other Providers [Y/N]:  Prior or Outpatient Records Reviewed [Y/N]:

## 2022-05-01 NOTE — PHYSICAL THERAPY INITIAL EVALUATION ADULT - PERTINENT HX OF CURRENT PROBLEM, REHAB EVAL
56 y/o F w/ pmhx of cerebral palsy (non-verbal, moans, bedbound at baseline), profound intellectual disability, seizure d/o, constipation, multiple admissions for aspiration PNA, (Pseudomonas & MRSA in sputum) requiring intubation, dysphagia s/p PEG w/ 2 recent admissions for issues related with aspiration p/w PEG tube malfunction.

## 2022-05-01 NOTE — PHYSICAL THERAPY INITIAL EVALUATION ADULT - LEVEL OF INDEPENDENCE, REHAB EVAL
Pt moaning when touching any extremity unable to accurately assess- pt bedbound PTA/unable to perform

## 2022-05-01 NOTE — PATIENT PROFILE ADULT - FALL HARM RISK - HARM RISK INTERVENTIONS

## 2022-05-01 NOTE — CHART NOTE - NSCHARTNOTEFT_GEN_A_CORE
Ptn admitted from a group home for a clogged tube and now with low grade temp  Pt nonverbal, unintelligible words .   Vital Signs Last 24 Hrs  T(C): 35.2 (01 May 2022 16:14), Max: 37 (30 Apr 2022 20:09)  T(F): 95.4 (01 May 2022 16:14), Max: 98.6 (30 Apr 2022 20:09)  HR: 86 (01 May 2022 14:00) (65 - 113)  BP: 127/63 (01 May 2022 14:00) (106/74 - 169/86)  BP(mean): --  RR: 20 (01 May 2022 14:00) (16 - 20)  SpO2: 98% (01 May 2022 14:00) (95% - 99%)  HEENT: dry mucosa   cv: s1 s2 RRR  lungs : congested cough , ptn does not follow commands  GI: abdomen obese , peg tube insitu , with some yellow secretion around tube  : incontinent  EXt : no edema                        14.4   6.84  )-----------( 163      ( 01 May 2022 06:08 )             44.6   Comprehensive Metabolic Panel (05.01.22 @ 06:08)   Sodium, Serum: 135 mmol/L   Potassium, Serum: 4.7 mmol/L   Chloride, Serum: 97 mmol/L   Carbon Dioxide, Serum: 26 mmol/L   Anion Gap, Serum: 12 mmol/L   Blood Urea Nitrogen, Serum: 24 mg/dL   Creatinine, Serum: 0.47 mg/dL   Glucose, Serum: 79 mg/dL   Calcium, Total Serum: 8.8 mg/dL   Protein Total, Serum: 7.2 g/dL   Albumin, Serum: 2.7 g/dL   Bilirubin Total, Serum: 0.3 mg/dL   Alkaline Phosphatase, Serum: 78 U/L   Aspartate Aminotransferase (AST/SGOT): 40 U/L   Alanine Aminotransferase (ALT/SGPT): 23 U/L   eGFR: 112: The estimated glomerular filtration rate (eGFR) is calculated using the   2021 CKD-EPI creatinine equation, which does not have a coefficient for   race and is validated in individuals 18 years of age and older (N Engl J   Med 2021; 385:5168-9097). Creatinine-based eGFR may be inaccurate in   A/P 57 yrs old female with Cerebral palsy admitted from a group home for dislodged peg now hypotermia  Pan cultures   Warming blanket   vital q4 until temp greater than 98

## 2022-05-01 NOTE — PHYSICAL THERAPY INITIAL EVALUATION ADULT - ADDITIONAL COMMENTS
Pt non-verbal, bedbound PTA. Tried calling mother Evonne Ponce to obtain social hx however no answer [phone].

## 2022-05-01 NOTE — PHYSICAL THERAPY INITIAL EVALUATION ADULT - PREDICTED DURATION OF THERAPY (DAYS/WKS), PT EVAL
PT WC Team to sign off, NEFTALY Guillen aware to consult podiatry team. Pt also currently at functional baseline- Bedbound, nonverbal PTA. Please reconsult if change in status. PT to sign off.

## 2022-05-01 NOTE — PHYSICAL THERAPY INITIAL EVALUATION ADULT - MODALITIES TREATMENT COMMENTS
Stage III pressure ulcer noted on R heel measuring 5cm x 2cm x 0.2cm. Wound with 100% granulation, no induration, no malodor, no erythhema. TTP. Cavilon applied followed by allevyn foam.

## 2022-05-02 LAB
ALBUMIN SERPL ELPH-MCNC: 2.6 G/DL — LOW (ref 3.3–5)
ALP SERPL-CCNC: 68 U/L — SIGNIFICANT CHANGE UP (ref 40–120)
ALT FLD-CCNC: 26 U/L — SIGNIFICANT CHANGE UP (ref 10–45)
ANION GAP SERPL CALC-SCNC: 11 MMOL/L — SIGNIFICANT CHANGE UP (ref 5–17)
APPEARANCE UR: CLEAR — SIGNIFICANT CHANGE UP
AST SERPL-CCNC: 66 U/L — HIGH (ref 10–40)
BACTERIA # UR AUTO: ABNORMAL
BILIRUB SERPL-MCNC: 0.2 MG/DL — SIGNIFICANT CHANGE UP (ref 0.2–1.2)
BILIRUB UR-MCNC: NEGATIVE — SIGNIFICANT CHANGE UP
BUN SERPL-MCNC: 19 MG/DL — SIGNIFICANT CHANGE UP (ref 7–23)
CALCIUM SERPL-MCNC: 8.5 MG/DL — SIGNIFICANT CHANGE UP (ref 8.4–10.5)
CHLORIDE SERPL-SCNC: 103 MMOL/L — SIGNIFICANT CHANGE UP (ref 96–108)
CO2 SERPL-SCNC: 27 MMOL/L — SIGNIFICANT CHANGE UP (ref 22–31)
COLOR SPEC: YELLOW — SIGNIFICANT CHANGE UP
CREAT SERPL-MCNC: 0.49 MG/DL — LOW (ref 0.5–1.3)
DIFF PNL FLD: NEGATIVE — SIGNIFICANT CHANGE UP
EGFR: 111 ML/MIN/1.73M2 — SIGNIFICANT CHANGE UP
EPI CELLS # UR: 0 /HPF — SIGNIFICANT CHANGE UP
GLUCOSE BLDC GLUCOMTR-MCNC: 71 MG/DL — SIGNIFICANT CHANGE UP (ref 70–99)
GLUCOSE BLDC GLUCOMTR-MCNC: 73 MG/DL — SIGNIFICANT CHANGE UP (ref 70–99)
GLUCOSE BLDC GLUCOMTR-MCNC: 74 MG/DL — SIGNIFICANT CHANGE UP (ref 70–99)
GLUCOSE BLDC GLUCOMTR-MCNC: 80 MG/DL — SIGNIFICANT CHANGE UP (ref 70–99)
GLUCOSE SERPL-MCNC: 63 MG/DL — LOW (ref 70–99)
GLUCOSE UR QL: NEGATIVE — SIGNIFICANT CHANGE UP
HCT VFR BLD CALC: 38.7 % — SIGNIFICANT CHANGE UP (ref 34.5–45)
HGB BLD-MCNC: 12.2 G/DL — SIGNIFICANT CHANGE UP (ref 11.5–15.5)
KETONES UR-MCNC: ABNORMAL
LEUKOCYTE ESTERASE UR-ACNC: NEGATIVE — SIGNIFICANT CHANGE UP
MCHC RBC-ENTMCNC: 31.5 GM/DL — LOW (ref 32–36)
MCHC RBC-ENTMCNC: 34.5 PG — HIGH (ref 27–34)
MCV RBC AUTO: 109.3 FL — HIGH (ref 80–100)
NITRITE UR-MCNC: NEGATIVE — SIGNIFICANT CHANGE UP
NRBC # BLD: 0 /100 WBCS — SIGNIFICANT CHANGE UP (ref 0–0)
PH UR: 8 — SIGNIFICANT CHANGE UP (ref 5–8)
PLATELET # BLD AUTO: 146 K/UL — LOW (ref 150–400)
POTASSIUM SERPL-MCNC: 4.3 MMOL/L — SIGNIFICANT CHANGE UP (ref 3.5–5.3)
POTASSIUM SERPL-SCNC: 4.3 MMOL/L — SIGNIFICANT CHANGE UP (ref 3.5–5.3)
PROT SERPL-MCNC: 6.3 G/DL — SIGNIFICANT CHANGE UP (ref 6–8.3)
PROT UR-MCNC: ABNORMAL
RBC # BLD: 3.54 M/UL — LOW (ref 3.8–5.2)
RBC # FLD: 13.3 % — SIGNIFICANT CHANGE UP (ref 10.3–14.5)
RBC CASTS # UR COMP ASSIST: 3 /HPF — SIGNIFICANT CHANGE UP (ref 0–4)
SODIUM SERPL-SCNC: 141 MMOL/L — SIGNIFICANT CHANGE UP (ref 135–145)
SP GR SPEC: 1.03 — HIGH (ref 1.01–1.02)
UROBILINOGEN FLD QL: ABNORMAL
WBC # BLD: 5.64 K/UL — SIGNIFICANT CHANGE UP (ref 3.8–10.5)
WBC # FLD AUTO: 5.64 K/UL — SIGNIFICANT CHANGE UP (ref 3.8–10.5)
WBC UR QL: 0 /HPF — SIGNIFICANT CHANGE UP (ref 0–5)

## 2022-05-02 PROCEDURE — 99233 SBSQ HOSP IP/OBS HIGH 50: CPT

## 2022-05-02 PROCEDURE — 49452 REPLACE G-J TUBE PERC: CPT

## 2022-05-02 RX ORDER — SODIUM CHLORIDE 9 MG/ML
1000 INJECTION, SOLUTION INTRAVENOUS
Refills: 0 | Status: DISCONTINUED | OUTPATIENT
Start: 2022-05-02 | End: 2022-05-06

## 2022-05-02 RX ORDER — DEXTROSE 50 % IN WATER 50 %
25 SYRINGE (ML) INTRAVENOUS ONCE
Refills: 0 | Status: DISCONTINUED | OUTPATIENT
Start: 2022-05-02 | End: 2022-05-06

## 2022-05-02 RX ADMIN — LEVETIRACETAM 400 MILLIGRAM(S): 250 TABLET, FILM COATED ORAL at 18:23

## 2022-05-02 RX ADMIN — LEVETIRACETAM 400 MILLIGRAM(S): 250 TABLET, FILM COATED ORAL at 05:48

## 2022-05-02 RX ADMIN — SODIUM CHLORIDE 4 MILLILITER(S): 9 INJECTION INTRAMUSCULAR; INTRAVENOUS; SUBCUTANEOUS at 05:48

## 2022-05-02 RX ADMIN — Medication 50 MILLIGRAM(S): at 21:33

## 2022-05-02 RX ADMIN — SODIUM CHLORIDE 4 MILLILITER(S): 9 INJECTION INTRAMUSCULAR; INTRAVENOUS; SUBCUTANEOUS at 18:20

## 2022-05-02 RX ADMIN — SODIUM CHLORIDE 50 MILLILITER(S): 9 INJECTION, SOLUTION INTRAVENOUS at 07:50

## 2022-05-02 RX ADMIN — Medication 1 APPLICATION(S): at 18:56

## 2022-05-02 RX ADMIN — Medication 3 MILLILITER(S): at 05:48

## 2022-05-02 RX ADMIN — Medication 50 MILLIGRAM(S): at 16:24

## 2022-05-02 RX ADMIN — LACOSAMIDE 140 MILLIGRAM(S): 50 TABLET ORAL at 07:04

## 2022-05-02 RX ADMIN — LACOSAMIDE 140 MILLIGRAM(S): 50 TABLET ORAL at 22:18

## 2022-05-02 RX ADMIN — SODIUM CHLORIDE 50 MILLILITER(S): 9 INJECTION, SOLUTION INTRAVENOUS at 18:19

## 2022-05-02 RX ADMIN — Medication 3 MILLILITER(S): at 23:15

## 2022-05-02 RX ADMIN — LACOSAMIDE 140 MILLIGRAM(S): 50 TABLET ORAL at 16:22

## 2022-05-02 RX ADMIN — Medication 50 MILLIGRAM(S): at 05:47

## 2022-05-02 RX ADMIN — Medication 0.5 MILLIGRAM(S): at 18:20

## 2022-05-02 RX ADMIN — ENOXAPARIN SODIUM 40 MILLIGRAM(S): 100 INJECTION SUBCUTANEOUS at 16:22

## 2022-05-02 RX ADMIN — Medication 0.5 MILLIGRAM(S): at 05:48

## 2022-05-02 RX ADMIN — Medication 3 MILLILITER(S): at 18:20

## 2022-05-02 NOTE — DIETITIAN INITIAL EVALUATION ADULT - PERTINENT MEDS FT
MEDICATIONS  (STANDING):  albuterol/ipratropium for Nebulization 3 milliLiter(s) Nebulizer every 6 hours  buDESOnide    Inhalation Suspension 0.5 milliGRAM(s) Inhalation two times a day  dextrose 10% + sodium chloride 0.9%. 1000 milliLiter(s) (50 mL/Hr) IV Continuous <Continuous>  dextrose 50% Injectable 25 milliLiter(s) IV Push once  enoxaparin Injectable 40 milliGRAM(s) SubCutaneous every 24 hours  lacosamide IVPB 200 milliGRAM(s) IV Intermittent <User Schedule>  levETIRAcetam  IVPB 500 milliGRAM(s) IV Intermittent every 12 hours  sodium chloride 3%  Inhalation 4 milliLiter(s) Inhalation every 12 hours  valproate sodium IVPB 750 milliGRAM(s) IV Intermittent three times a day  vitamin A &amp; D Ointment 1 Application(s) Topical daily    MEDICATIONS  (PRN):  ondansetron Injectable 4 milliGRAM(s) IV Push every 8 hours PRN Nausea and/or Vomiting

## 2022-05-02 NOTE — DIETITIAN INITIAL EVALUATION ADULT - ENTERAL
Jevity 1.5 @ 55ml/hr x 18 hours. provides 1485kcal/55 grams protein. meets 35kcal/kg and 1.2 grams protein/kg. based on IBW (5.4kg)

## 2022-05-02 NOTE — DIETITIAN INITIAL EVALUATION ADULT - PERTINENT LABORATORY DATA
05-02    141  |  103  |  19  ----------------------------<  63<L>  4.3   |  27  |  0.49<L>    Ca    8.5      02 May 2022 07:03  Mg     1.7     05-01    TPro  6.3  /  Alb  2.6<L>  /  TBili  0.2  /  DBili  x   /  AST  66<H>  /  ALT  26  /  AlkPhos  68  05-02  POCT Blood Glucose.: 71 mg/dL (05-02-22 @ 06:54)  A1C with Estimated Average Glucose Result: 5.1 % (02-05-22 @ 15:24)

## 2022-05-02 NOTE — PROCEDURE NOTE - PROCEDURE FINDINGS AND DETAILS
Existing gastrostomy tube was visualized.   The gastrostomy tube was exchanged over a guidewire for a new 16 Fr gastrostomy tube.   Tube placement was confirmed with oral contrast under fluoroscopic guidance.

## 2022-05-02 NOTE — DIETITIAN INITIAL EVALUATION ADULT - ETIOLOGY
changes in GI function inadequate protein-energy intake inadequate protein-energy intake, increased nutrient needs

## 2022-05-02 NOTE — DIETITIAN INITIAL EVALUATION ADULT - SIGNS/SYMPTOMS
NPO, edema, pressure injury pt having Gastrostomy tube replacement NPO, edema, pressure injury, stage 3 right heel

## 2022-05-02 NOTE — PROCEDURE NOTE - NSINFORMCONSENT_GEN_A_CORE
Family Consent/Benefits, risks, and possible complications of procedure explained to patient/caregiver who verbalized understanding and gave written consent.

## 2022-05-02 NOTE — PROCEDURE NOTE - CONTRAST
Oral contrast administered through the Gastrostomy tube. Oral contrast administered through the Gastrostomy tube, approximately 30 cc

## 2022-05-02 NOTE — PROGRESS NOTE ADULT - SUBJECTIVE AND OBJECTIVE BOX
[facility] Division of Hospital Medicine  Eladio Adams MD  Pager (M-F, 7N-1G): [phone]  Other Times:  [phone]    Patient is a 57y old  Female who presents with a chief complaint of Clogged PEG tube (02 May 2022 13:51)    SUBJECTIVE / OVERNIGHT EVENTS: for peg replacement today. no acute events. pt unable to cooperate with interview due to nonverbal condition  ADDITIONAL REVIEW OF SYSTEMS:    MEDICATIONS  (STANDING):  albuterol/ipratropium for Nebulization 3 milliLiter(s) Nebulizer every 6 hours  buDESOnide    Inhalation Suspension 0.5 milliGRAM(s) Inhalation two times a day  dextrose 10% + sodium chloride 0.9%. 1000 milliLiter(s) (50 mL/Hr) IV Continuous <Continuous>  dextrose 50% Injectable 25 milliLiter(s) IV Push once  enoxaparin Injectable 40 milliGRAM(s) SubCutaneous every 24 hours  lacosamide IVPB 200 milliGRAM(s) IV Intermittent <User Schedule>  levETIRAcetam  IVPB 500 milliGRAM(s) IV Intermittent every 12 hours  sodium chloride 3%  Inhalation 4 milliLiter(s) Inhalation every 12 hours  valproate sodium IVPB 750 milliGRAM(s) IV Intermittent three times a day  vitamin A &amp; D Ointment 1 Application(s) Topical daily    MEDICATIONS  (PRN):  ondansetron Injectable 4 milliGRAM(s) IV Push every 8 hours PRN Nausea and/or Vomiting      CAPILLARY BLOOD GLUCOSE      POCT Blood Glucose.: 71 mg/dL (02 May 2022 06:54)  POCT Blood Glucose.: 84 mg/dL (01 May 2022 23:58)  POCT Blood Glucose.: 90 mg/dL (01 May 2022 18:18)    I&O's Summary    01 May 2022 07:01  -  02 May 2022 07:00  --------------------------------------------------------  IN: 1200 mL / OUT: 0 mL / NET: 1200 mL    02 May 2022 07:01  -  02 May 2022 13:54  --------------------------------------------------------  IN: 0 mL / OUT: 0 mL / NET: 0 mL        PHYSICAL EXAM:  Vital Signs Last 24 Hrs  T(C): 36.4 (02 May 2022 12:21), Max: 37.8 (02 May 2022 08:38)  T(F): 97.5 (02 May 2022 12:21), Max: 100.1 (02 May 2022 08:38)  HR: 99 (02 May 2022 12:21) (75 - 105)  BP: 123/78 (02 May 2022 12:21) (100/79 - 141/89)  BP(mean): --  RR: 18 (02 May 2022 12:21) (18 - 20)  SpO2: 94% (02 May 2022 12:21) (94% - 98%)  GENERAL: NAD, well-developed  HEAD:  Atraumatic, Normocephalic  EYES:  conjunctiva and sclera clear  NECK: Supple, No JVD  CHEST/LUNG: Clear to auscultation bilaterally; No wheeze  HEART: Regular rate and rhythm; No murmurs, rubs, or gallops  ABDOMEN: Soft, Nontender, Nondistended; Bowel sounds present. PEG in place, No surrounding warmth, erythema or fluctuance   EXTREMITIES:  2+ Peripheral Pulses, No clubbing, cyanosis, or edema  PSYCH: AAOx0  NEUROLOGY: non-focal  SKIN: No rashes or lesions    LABS:                        12.2   5.64  )-----------( 146      ( 02 May 2022 07:10 )             38.7     05-02    141  |  103  |  19  ----------------------------<  63<L>  4.3   |  27  |  0.49<L>    Ca    8.5      02 May 2022 07:03  Mg     1.7     05-01    TPro  6.3  /  Alb  2.6<L>  /  TBili  0.2  /  DBili  x   /  AST  66<H>  /  ALT  26  /  AlkPhos  68  05-02    PT/INR - ( 01 May 2022 06:08 )   PT: 11.8 sec;   INR: 1.02 ratio         PTT - ( 01 May 2022 06:08 )  PTT:33.6 sec            RADIOLOGY & ADDITIONAL TESTS:  Results Reviewed:   Imaging Personally Reviewed:  Electrocardiogram Personally Reviewed:    COORDINATION OF CARE:  Care Discussed with Consultants/Other Providers [Y/N]:  Prior or Outpatient Records Reviewed [Y/N]:   [facility] Division of Hospital Medicine  Eladio Adams MD  Pager (M-F, 8U-8P): [phone]  Other Times:  [phone]    Patient is a 57y old  Female who presents with a chief complaint of Clogged PEG tube (02 May 2022 13:51)    SUBJECTIVE / OVERNIGHT EVENTS: for peg replacement today. no acute events. pt unable to cooperate with interview due to nonverbal condition  ADDITIONAL REVIEW OF SYSTEMS:    MEDICATIONS  (STANDING):  albuterol/ipratropium for Nebulization 3 milliLiter(s) Nebulizer every 6 hours  buDESOnide    Inhalation Suspension 0.5 milliGRAM(s) Inhalation two times a day  dextrose 10% + sodium chloride 0.9%. 1000 milliLiter(s) (50 mL/Hr) IV Continuous <Continuous>  dextrose 50% Injectable 25 milliLiter(s) IV Push once  enoxaparin Injectable 40 milliGRAM(s) SubCutaneous every 24 hours  lacosamide IVPB 200 milliGRAM(s) IV Intermittent <User Schedule>  levETIRAcetam  IVPB 500 milliGRAM(s) IV Intermittent every 12 hours  sodium chloride 3%  Inhalation 4 milliLiter(s) Inhalation every 12 hours  valproate sodium IVPB 750 milliGRAM(s) IV Intermittent three times a day  vitamin A &amp; D Ointment 1 Application(s) Topical daily    MEDICATIONS  (PRN):  ondansetron Injectable 4 milliGRAM(s) IV Push every 8 hours PRN Nausea and/or Vomiting      CAPILLARY BLOOD GLUCOSE      POCT Blood Glucose.: 71 mg/dL (02 May 2022 06:54)  POCT Blood Glucose.: 84 mg/dL (01 May 2022 23:58)  POCT Blood Glucose.: 90 mg/dL (01 May 2022 18:18)    I&O's Summary    01 May 2022 07:01  -  02 May 2022 07:00  --------------------------------------------------------  IN: 1200 mL / OUT: 0 mL / NET: 1200 mL    02 May 2022 07:01  -  02 May 2022 13:54  --------------------------------------------------------  IN: 0 mL / OUT: 0 mL / NET: 0 mL        PHYSICAL EXAM:  Vital Signs Last 24 Hrs  T(C): 36.4 (02 May 2022 12:21), Max: 37.8 (02 May 2022 08:38)  T(F): 97.5 (02 May 2022 12:21), Max: 100.1 (02 May 2022 08:38)  HR: 99 (02 May 2022 12:21) (75 - 105)  BP: 123/78 (02 May 2022 12:21) (100/79 - 141/89)  BP(mean): --  RR: 18 (02 May 2022 12:21) (18 - 20)  SpO2: 94% (02 May 2022 12:21) (94% - 98%)  GENERAL: NAD, well-developed  HEAD:  Atraumatic, Normocephalic  EYES:  conjunctiva and sclera clear  NECK: Supple, No JVD  CHEST/LUNG: Clear to auscultation bilaterally; No wheeze  HEART: Regular rate and rhythm; No murmurs, rubs, or gallops  ABDOMEN: Soft, Nontender, Nondistended; Bowel sounds present. PEG in place, No surrounding warmth, erythema or fluctuance   EXTREMITIES:  2+ Peripheral Pulses, No clubbing, cyanosis, or edema  PSYCH: AAOx0 nonverbal  NEUROLOGY: nonverbal, makes eye contact but cannot cooperate w exam  SKIN: No rashes or lesions    LABS:                        12.2   5.64  )-----------( 146      ( 02 May 2022 07:10 )             38.7     05-02    141  |  103  |  19  ----------------------------<  63<L>  4.3   |  27  |  0.49<L>    Ca    8.5      02 May 2022 07:03  Mg     1.7     05-01    TPro  6.3  /  Alb  2.6<L>  /  TBili  0.2  /  DBili  x   /  AST  66<H>  /  ALT  26  /  AlkPhos  68  05-02    PT/INR - ( 01 May 2022 06:08 )   PT: 11.8 sec;   INR: 1.02 ratio         PTT - ( 01 May 2022 06:08 )  PTT:33.6 sec            RADIOLOGY & ADDITIONAL TESTS:  Results Reviewed:   Imaging Personally Reviewed:  Electrocardiogram Personally Reviewed:    COORDINATION OF CARE:  Care Discussed with Consultants/Other Providers [Y/N]:  Prior or Outpatient Records Reviewed [Y/N]:

## 2022-05-02 NOTE — PRE PROCEDURE NOTE - PRE PROCEDURE EVALUATION
Interventional Radiology  HPI: 57F w/ pmhx of cerebral palsy (non-verbal, moans, bedbound at baseline), profound intellectual disability, seizure d/o, constipation, multiple admissions for aspiration PNA, (Pseudomonas & MRSA in sputum) requiring intubation, dysphagia s/p PEG w/ 2 recent admissions for issues related with aspiration p/w gastrostomy tube malfunction. IR consulted for g tube replacement.    Allergies: Topamax (Unknown)    Medications (Abx/Cardiac/Anticoagulation/Blood Products)  enoxaparin Injectable: 40 milliGRAM(s) SubCutaneous (05-01 @ 11:14)    Data:  T(C): 37.8  HR: 103  BP: 134/90  RR: 18  SpO2: 95%    Exam  General: No acute distress  Chest: Non labored breathing  Abdomen: Non-distended  Extremities: No swelling, warm    -WBC 5.64 / HgB 12.2 / Hct 38.7 / Plt 146  -Na 141 / Cl 103 / BUN 19 / Glucose 63  -K 4.3 / CO2 27 / Cr 0.49  -ALT 26 / Alk Phos 68 / T.Bili 0.2  -INR1.02    Plan: 57y Female presents for G-Tube exchange  -Risks/Benefits/alternatives explained with the mother/healthcare proxy- Arpita Ponce ([phone]) and witnessed informed consent obtained.

## 2022-05-02 NOTE — PROCEDURE NOTE - PLAN
Gastrostomy tube replaced with new 16 Fr Gastrostomy tube.   Placement has been confirmed.   OK to use

## 2022-05-02 NOTE — PROCEDURE NOTE - PATIENT CONDITION/DISPOSITION
PT DAILY TREATMENT NOTE - [facility]     Patient Name: Will Matson  Date:2018  : 1923  [x]  Patient  Verified  Payor: Evangelina Anthony / Plan: [place] MED[place]ARE PART A & B / Product Type: Medicare /    In time:11:37  Out time:12:23  Total Treatment Time (min): 41  Total Timed Codes (min):41  1:1 Treatment Time ( [address] only): 28  Visit #: 2 of     Treatment Area: Pain in left elbow [M25.522]    SUBJECTIVE  4  Pain Level (0-10 scale): Any medication changes, allergies to medications, adverse drug reactions, diagnosis change, or new procedure performed?: [x] No    [] Yes (see summary sheet for update)  Subjective functional status/changes:   [] No changes reported  It  Ached  Last  Night.     OBJECTIVE    Modality rationale: decrease edema, decrease inflammation, decrease pain and increase tissue extensibility to improve the patients ability to perform ADL    Min Type Additional Details    [] Estim:  []Unatt       []IFC  []Premod                        []Other:  []w/ice   []w/heat  Position:  Location:   8 [x] Estim: [x]Att    []TENS instruct  []NMES                    [x]Other: LTO []w/US   []w/ice   []w/heat  Position:seated  Location:(R)  Wrist/forearm    []  Traction: [] Cervical       []Lumbar                       [] Prone          []Supine                       []Intermittent   []Continuous Lbs:  [] before manual  [] after manual    []  Ultrasound: []Continuous   [] Pulsed                           []1MHz   []3MHz W/cm2:  Location:    []  Iontophoresis with dexamethasone         Location: [] Take home patch   [] In clinic    []  Ice     []  heat  []  Ice massage  []  Laser   []  Anodyne Position:  Location:    []  Laser with stim  []  Other:  Position:  Location:    []  Vasopneumatic Device Pressure:       [] lo [] med [] hi   Temperature: [] lo [] med [] hi   [x] Skin assessment post-treatment:  [x]intact []redness- no adverse reaction    []redness  adverse reaction:      min []Eval []Re-Eval       23  1:1  10 min Therapeutic Exercise:  [x] See flow sheet :   Rationale: increase ROM and increase strength to improve the patients ability to perform ADL      min Therapeutic Activity:  []  See flow sheet :   Rationale:   to improve the patients ability to       min Neuromuscular Re-education:  []  See flow sheet :   Rationale:   to improve the patients ability to    10 min Manual Therapy:  STM/effleurage   Rationale: decrease pain, increase ROM, increase tissue extensibility and decrease edema  to perform ADL      min Gait Training:  ___ feet with ___ device on level surfaces with ___ level of assist   Rationale: With   [x] TE   [] TA   [] neuro   [] other: Patient Education: [x] Review HEP    [] Progressed/Changed HEP based on:   [] positioning   [] body mechanics   [] transfers   [] heat/ice application    [] other:      Other Objective/Functional Measures:  Tender  And  Tightness (R) brachioradlis    Pain Level (0-10 scale) post treatment: 0      ASSESSMENT/Changes in Function: pt  C/o  numbnes  At  Hand  Following  Treatment. Overall  fair  Response  To each there ex. Patient will continue to benefit from skilled PT services to address functional mobility deficits, address ROM deficits, address strength deficits, analyze and address soft tissue restrictions, analyze and cue movement patterns and instruct in home and community integration to attain remaining goals.      [x]  See Plan of Care  []  See progress note/recertification  []  See Discharge Summary         Progress towards goals / Updated goals:  Short Term Goals: To be accomplished in 5 treatments:                        9 patient will have established and be I with HEP to aid with progression of skilled PT program                        EVAL issued                                     CURRENT: reported compliance with HEP and brace                           2 patient will have pain 3/10 to aid with increase tolerance to ADLS and activities                        EVAL 5                        CURRENT:  4   Before  Treatment   Following  0  1/30/18                             1 patient will have FOTO 56 to aid with increase tolerance to activities and ADLS including knitting and grandchild interaction                        EVAL 48                        CURRENT: [facility] be accomplished in 12-20 treatments:                        2 patient will have pain 2/10 to aid with increase tolerance to ADLS and activities                        EVAL 5                        CURRENT  6                           2 patient will have FOTO 59 to aid with increase tolerance to activities and ADLS including knitting and grandchild interaction                        EVAL 53                        CURRENT: n/a                           9 patient will report 30% improvement to aid with increase tolerance to knitting and washing dishes                        EVAL                                     CURRENT: no change     PLAN  []  Upgrade activities as tolerated     [x]  Continue plan of care  []  Update interventions per flow sheet       []  Discharge due to:_  []  Other:_      Marilia Moseley PTA 1/30/2018  11:39 AM    Future Appointments  Date Time Provider Olga Aragon   2/1/2018 1:30 PM Marilia Maya PTA [facility]   2/6/2018 11:30 AM Miinsterio Khan PT [facility]   2/8/2018 10:30 AM Ministerio Khan PT [facility]   2/13/2018 10:40 AM Abner Galicia MD [address] [place] [address]   2/14/2018 11:30 AM Ministerio Khan PT [facility]   2/16/2018 11:30 AM Ministerio Khan PT [facility]   2/20/2018 11:30 AM Marilia Moseley PTA [facility]   2/22/2018 1:30 PM Marilia Moseley PTA [facility]   2/27/2018 11:30 AM Ministerio Khan PT [facility] Back to floor Statement Selected

## 2022-05-03 ENCOUNTER — TRANSCRIPTION ENCOUNTER (OUTPATIENT)
Age: 57
End: 2022-05-03

## 2022-05-03 DIAGNOSIS — L89.613 PRESSURE ULCER OF RIGHT HEEL, STAGE 3: ICD-10-CM

## 2022-05-03 LAB
GLUCOSE BLDC GLUCOMTR-MCNC: 124 MG/DL — HIGH (ref 70–99)
GLUCOSE BLDC GLUCOMTR-MCNC: 151 MG/DL — HIGH (ref 70–99)
GLUCOSE BLDC GLUCOMTR-MCNC: 80 MG/DL — SIGNIFICANT CHANGE UP (ref 70–99)
SARS-COV-2 RNA SPEC QL NAA+PROBE: SIGNIFICANT CHANGE UP

## 2022-05-03 PROCEDURE — 99231 SBSQ HOSP IP/OBS SF/LOW 25: CPT

## 2022-05-03 PROCEDURE — 99233 SBSQ HOSP IP/OBS HIGH 50: CPT

## 2022-05-03 RX ORDER — COLLAGENASE CLOSTRIDIUM HIST. 250 UNIT/G
1 OINTMENT (GRAM) TOPICAL DAILY
Refills: 0 | Status: DISCONTINUED | OUTPATIENT
Start: 2022-05-03 | End: 2022-05-06

## 2022-05-03 RX ADMIN — LEVETIRACETAM 400 MILLIGRAM(S): 250 TABLET, FILM COATED ORAL at 05:23

## 2022-05-03 RX ADMIN — SODIUM CHLORIDE 50 MILLILITER(S): 9 INJECTION, SOLUTION INTRAVENOUS at 21:34

## 2022-05-03 RX ADMIN — LACOSAMIDE 140 MILLIGRAM(S): 50 TABLET ORAL at 21:34

## 2022-05-03 RX ADMIN — ENOXAPARIN SODIUM 40 MILLIGRAM(S): 100 INJECTION SUBCUTANEOUS at 13:01

## 2022-05-03 RX ADMIN — SODIUM CHLORIDE 50 MILLILITER(S): 9 INJECTION, SOLUTION INTRAVENOUS at 13:01

## 2022-05-03 RX ADMIN — Medication 3 MILLILITER(S): at 05:29

## 2022-05-03 RX ADMIN — Medication 50 MILLIGRAM(S): at 13:03

## 2022-05-03 RX ADMIN — Medication 50 MILLIGRAM(S): at 05:59

## 2022-05-03 RX ADMIN — Medication 50 MILLIGRAM(S): at 21:35

## 2022-05-03 RX ADMIN — SODIUM CHLORIDE 4 MILLILITER(S): 9 INJECTION INTRAMUSCULAR; INTRAVENOUS; SUBCUTANEOUS at 17:26

## 2022-05-03 RX ADMIN — Medication 0.5 MILLIGRAM(S): at 05:29

## 2022-05-03 RX ADMIN — SODIUM CHLORIDE 4 MILLILITER(S): 9 INJECTION INTRAMUSCULAR; INTRAVENOUS; SUBCUTANEOUS at 05:29

## 2022-05-03 RX ADMIN — Medication 1 APPLICATION(S): at 13:03

## 2022-05-03 RX ADMIN — LEVETIRACETAM 400 MILLIGRAM(S): 250 TABLET, FILM COATED ORAL at 17:26

## 2022-05-03 RX ADMIN — LACOSAMIDE 140 MILLIGRAM(S): 50 TABLET ORAL at 15:17

## 2022-05-03 RX ADMIN — LACOSAMIDE 140 MILLIGRAM(S): 50 TABLET ORAL at 09:05

## 2022-05-03 RX ADMIN — Medication 3 MILLILITER(S): at 17:26

## 2022-05-03 RX ADMIN — Medication 3 MILLILITER(S): at 13:02

## 2022-05-03 RX ADMIN — Medication 0.5 MILLIGRAM(S): at 17:26

## 2022-05-03 RX ADMIN — Medication 1 APPLICATION(S): at 17:26

## 2022-05-03 NOTE — PROGRESS NOTE ADULT - PROBLEM SELECTOR PLAN 2
type unspecified    Non-verbal, moans, bedbound at baseline, profound intellectual disability    -Normal feeds are Jevity 1.5 ELIZABETH 4 cans to run at 50ccs/hr for 18 hours for total 900ccs. Flush 25/hr for 18hrs, total 450. Flush with 60ccs water before and after. Nutrition consult type unspecified  Complete immobility due to severe physical disability or frailty  Non-verbal, moans, bedbound at baseline, profound intellectual disability    -Normal feeds are Jevity 1.5 ELIZABETH 4 cans to run at 50ccs/hr for 18 hours for total 900ccs. Flush 25/hr for 18hrs, total 450. Flush with 60ccs water before and after. Nutrition consult

## 2022-05-03 NOTE — DISCHARGE NOTE PROVIDER - NSDCFUADDINST_GEN_ALL_CORE_FT
Podiatry Discharge Instructions:  Follow up: Please follow up with Dr. Kessler within 1 week of discharge from the hospital, please call [phone] for appointment and discuss that you recently were seen in the hospital.  Wound Care: Please apply santyl to R heel wound daily followed by allevyn pad.  Weight bearing: offload bilateral heels w/ z flow booties

## 2022-05-03 NOTE — PROVIDER CONTACT NOTE (OTHER) - ACTION/TREATMENT ORDERED:
Hold tube feedings until the morning.
NEFTALY Hardin wants to give one time dose of dextrose 50% injectable and switch the fluids to D10+NS0.9% at 50ml/hr. Will let day RN know- these meds have not been verified by pharmacy as of now

## 2022-05-03 NOTE — DISCHARGE NOTE PROVIDER - NSDCCPCAREPLAN_GEN_ALL_CORE_FT
PRINCIPAL DISCHARGE DIAGNOSIS  Diagnosis: PEG tube malfunction  Assessment and Plan of Treatment:   56F w/ pmhx of cerebral palsy (non-verbal, moans, bedbound at baseline), profound intellectual disability, seizure d/o, constipation, multiple admissions for aspiration PNA, (Pseudomonas & MRSA in sputum) requiring intubation, dysphagia s/p PEG w/ 2 recent admissions for issues related with aspiration p/w PEG tube malfunction. [facility] earlier today. Got anti-epileptics in AM prior to clogging. Family also requesting new Rx for liquid form of calcium carbonate to prevent clogging. ER states they were also unable to deflate balloon. Tube also could not be flushed. As per documentation, ED team spoke to IR resident, will try to get on schedule.   In ER: Given LR  Overall patient admitted for clogged peg tube , s/p peg tube replacement on 5/2 . Tube feedings resumed and at goal rate      SECONDARY DISCHARGE DIAGNOSES  Diagnosis: Decubitus ulcer of right heel, stage 3  Assessment and Plan of Treatment: : Decubitus ulcer of right heel, stage 3.   dressed as per podiatry   -R heel wound to subQ, no periwound erythema, no malodor, no drainage, no acute signs of infection  -No indication for wound culture as there are no acute signs of infection  -Plan for local wound care, santyl collagenase w/ allevyn pads daily  -Please offload bilateral heels w/ z flow booties    Diagnosis: Constipation  Assessment and Plan of Treatment: Problem: Constipation.   ·  Plan: -Cont. senna and miralax  -Needs laxative and/or milk of magnesia if no BM.    Diagnosis: Seizures  Assessment and Plan of Treatment: Problem: Seizures.   ·  Plan: Need to replace PEG tube. Will cont. doses as IV for now as able.  -Cont. Keppra 600mg  BID  -Cont. Vimpat TID  -Cont. valproic acid TID  -changed to po as PEG replaced  -seizure precautions.    Diagnosis: Pneumonia, aspiration  Assessment and Plan of Treatment: cxr was done and no pneumonia ,. aspirations precautions    Diagnosis: Cerebral palsy  Assessment and Plan of Treatment: supportive care;   ·  Problem: Cerebral palsy.   ·  Plan: type unspecified  Complete immobility due to severe physical disability or frailty  Non-verbal, moans, bedbound at baseline, profound intellectual disability     PRINCIPAL DISCHARGE DIAGNOSIS  Diagnosis: PEG tube malfunction  Assessment and Plan of Treatment: 56F w/ pmhx of cerebral palsy (non-verbal, moans, bedbound at baseline), profound intellectual disability, seizure d/o, constipation, multiple admissions for aspiration PNA, (Pseudomonas & MRSA in sputum) requiring intubation, dysphagia s/p PEG w/ 2 recent admissions for issues related with aspiration p/w PEG tube malfunction. [facility] tube earlier today. Got anti-epileptics in AM prior to clogging. Family also requesting new Rx for liquid form of calcium carbonate to prevent clogging. ER states they were also unable to deflate balloon. Tube also could not be flushed. As per documentation, ED team spoke to IR resident, will try to get on schedule.   In ER: Given LR  Overall patient admitted for clogged peg tube , s/p peg tube replacement on 5/2 . Tube feedings resumed and at goal rate      SECONDARY DISCHARGE DIAGNOSES  Diagnosis: Cerebral palsy  Assessment and Plan of Treatment: supportive care;   ·  Problem: Cerebral palsy.   ·  Plan: type unspecified  Complete immobility due to severe physical disability or frailty  Non-verbal, moans, bedbound at baseline, profound intellectual disability    Diagnosis: Pneumonia, aspiration  Assessment and Plan of Treatment: cxr was done and no pneumonia ,. aspirations precautions    Diagnosis: Seizures  Assessment and Plan of Treatment: Problem: Seizures.   ·  Plan: Need to replace PEG tube. Will cont. doses as IV for now as able.  -Cont. Keppra 600mg  BID  -Cont. Vimpat TID  -Cont. valproic acid TID  -changed to po as PEG replaced  -seizure precautions.    Diagnosis: Constipation  Assessment and Plan of Treatment: Problem: Constipation.   ·  Plan: -Cont. senna and miralax  -Needs laxative and/or milk of magnesia if no BM.    Diagnosis: Decubitus ulcer of right heel, stage 3  Assessment and Plan of Treatment: : Decubitus ulcer of right heel, stage 3.   dressed as per podiatry   -R heel wound to subQ, no periwound erythema, no malodor, no drainage, no acute signs of infection  -No indication for wound culture as there are no acute signs of infection  -Plan for local wound care, santyl collagenase w/ allevyn pads daily  -Please offload bilateral heels w/ z flow booties

## 2022-05-03 NOTE — PROVIDER CONTACT NOTE (OTHER) - BACKGROUND
Admitted for gastronomy malfunction. PMH: Asthma, intellectual disability, seizure disorder, cerebral palsy, aspiration pneumonia

## 2022-05-03 NOTE — DISCHARGE NOTE PROVIDER - DETAILS OF MALNUTRITION DIAGNOSIS/DIAGNOSES
This patient has been assessed with a concern for Malnutrition and was treated during this hospitalization for the following Nutrition diagnosis/diagnoses:     -  05/02/2022: Severe protein-calorie malnutrition

## 2022-05-03 NOTE — DISCHARGE NOTE PROVIDER - HOSPITAL COURSE
56F w/ pmhx of cerebral palsy (non-verbal, moans, bedbound at baseline), profound intellectual disability, seizure d/o, constipation, multiple admissions for aspiration PNA, (Pseudomonas & MRSA in sputum) requiring intubation, dysphagia s/p PEG w/ 2 recent admissions for issues related with aspiration p/w PEG tube malfunction. [facility] tube earlier today. Got anti-epileptics in AM prior to clogging. Family also requesting new Rx for liquid form of calcium carbonate to prevent clogging. ER states they were also unable to deflate balloon. Tube also could not be flushed. As per documentation, ED team spoke to IR resident, will try to get on schedule.     In ER: Given LR  Overall patient admitted for clogged peg tube , s/p peg tube replacement on 5/2 . Tube feedings resumed and at goal rate . Ptn had cough and concerned for aspiration , chest xray ordered and show......     Problem/Plan - 2:  ·  Problem: Cerebral palsy.   ·  Plan: type unspecified  Complete immobility due to severe physical disability or frailty  Non-verbal, moans, bedbound at baseline, profound intellectual disability    -Normal feeds are Jevity 1.5 ELIZABETH 4 cans to run at 50ccs/hr for 18 hours for total 900ccs. Flush 25/hr for 18hrs, total 450. Flush with 60ccs water before and after. Nutrition consult.     Problem/Plan - 3:  ·  Problem: Seizures.   ·  Plan: Need to replace PEG tube. Will cont. doses as IV for now as able.  -Cont. Keppra 600mg IVPB BID  -Cont. Vimpat IVPB TID  -Cont. valproic acid IVPB TID  -changed to po as PEG replaced  -seizure precautions.     Problem/Plan - 4:  ·  Problem: Pneumonia, aspiration.   ·  Plan: Issues with aspiration during admissions earlier this year and excessive secretions. Unclear if prior degree of treatment still required.  -no current sign of pna  -Cont. q4hr oral suctions for now and down titrate frequency as needed  -Cont. Nebs q6 and 3% hypertonic saline 4ml BID for now, see above  -Do not see Scopolamine patch in med rec of attached paperwork    noted transient hypothermia and tachycardia which resolved  possible aspiration event - will monitor  check CXR...........    consider CT if recurrent issues.     Problem/Plan - 5:  ·  Problem: Constipation.   ·  Plan: -Cont. senna and miralax  -Needs laxative and/or milk of magnesia if no BM.     Problem/Plan - 6:  ·  Problem: DVT prophylaxis.   ·  Plan: DVT PP -Lovenox daily.     Problem/Plan - 7:  ·  Problem: Decubitus ulcer of right heel, stage 3.   ·  Plan: wound pt apprec  dressed as per podiatry   -R heel wound to subQ, no periwound erythema, no malodor, no drainage, no acute signs of infection  -No indication for wound culture as there are no acute signs of infection  -Plan for local wound care, santyl collagenase w/ allevyn pads daily  -Please offload bilateral heels w/ z flow booties         56F w/ pmhx of cerebral palsy (non-verbal, moans, bedbound at baseline), profound intellectual disability, seizure d/o, constipation, multiple admissions for aspiration PNA, (Pseudomonas & MRSA in sputum) requiring intubation, dysphagia s/p PEG w/ 2 recent admissions for issues related with aspiration p/w PEG tube malfunction. [facility] earlier today. Got anti-epileptics in AM prior to clogging. Family also requesting new Rx for liquid form of calcium carbonate to prevent clogging. ER states they were also unable to deflate balloon. Tube also could not be flushed. As per documentation, ED team spoke to IR resident, will try to get on schedule.     In ER: Given LR  Overall patient admitted for clogged peg tube , s/p peg tube replacement on 5/2 . Tube feedings resumed and at goal rate . Ptn had cough and concerned for aspiration , chest xray ordered and show......     Problem/Plan - 2:  ·  Problem: Cerebral palsy.   ·  Plan: type unspecified  Complete immobility due to severe physical disability or frailty  Non-verbal, moans, bedbound at baseline, profound intellectual disability    -Normal feeds are Jevity 1.5 ELIZABETH 4 cans to run at 50ccs/hr for 18 hours for total 900ccs. Flush 25/hr for 18hrs, total 450. Flush with 60ccs water before and after. Nutrition consult.     Problem/Plan - 3:  ·  Problem: Seizures.   ·  Plan: Need to replace PEG tube. Will cont. doses as IV for now as able.  -Cont. Keppra 600mg IVPB BID  -Cont. Vimpat IVPB TID  -Cont. valproic acid IVPB TID  -changed to po as PEG replaced  -seizure precautions.     Problem/Plan - 4:  ·  Problem: Pneumonia, aspiration.   ·  Plan: Issues with aspiration during admissions earlier this year and excessive secretions. Unclear if prior degree of treatment still required.  -no current sign of pna  -Cont. Nebs q6 and 3% hypertonic saline 4ml BID for now, see above      noted transient hypothermia and tachycardia which resolved  possible aspiration event - will monitor  check CXR...........    consider CT if recurrent issues.     Problem/Plan - 5:  ·  Problem: Constipation.   ·  Plan: -Cont. senna and miralax  -Needs laxative and/or milk of magnesia if no BM.     Problem/Plan - 6:  ·  Problem: DVT prophylaxis.   ·  Plan: DVT PP -Lovenox daily.     Problem/Plan - 7:  ·  Problem: Decubitus ulcer of right heel, stage 3.   ·  Plan: wound pt apprec  dressed as per podiatry   -R heel wound to subQ, no periwound erythema, no malodor, no drainage, no acute signs of infection  -No indication for wound culture as there are no acute signs of infection  -Plan for local wound care, santyl collagenase w/ allevyn pads daily  -Please offload bilateral heels w/ z flow booties         56F w/ pmhx of cerebral palsy (non-verbal, moans, bedbound at baseline), profound intellectual disability, seizure d/o, constipation, multiple admissions for aspiration PNA, (Pseudomonas & MRSA in sputum) requiring intubation, dysphagia s/p PEG w/ 2 recent admissions for issues related with aspiration p/w PEG tube malfunction. [facility] tube earlier today. Got anti-epileptics in AM prior to clogging. Family also requesting new Rx for liquid form of calcium carbonate to prevent clogging. ER states they were also unable to deflate balloon. Tube also could not be flushed. As per documentation, ED team spoke to IR resident, will try to get on schedule.     In ER: Given LR  Overall patient admitted for clogged peg tube , s/p peg tube replacement on 5/2 . Tube feedings resumed and at goal rate . Ptn had cough and concerned for aspiration , chest xray ordered and show......     Problem/Plan - 2:  ·  Problem: Cerebral palsy.   ·  Plan: type unspecified  Complete immobility due to severe physical disability or frailty  Non-verbal, moans, bedbound at baseline, profound intellectual disability    -Normal feeds are Jevity 1.5 ELIZABETH 4 cans to run at 50ccs/hr for 18 hours for total 900ccs. Flush 25/hr for 18hrs, total 450. Flush with 60ccs water before and after. Nutrition consult.       Problem/Plan - 4:  ·  Problem: Pneumonia, aspiration.   ·  Plan: Issues with aspiration during admissions earlier this year and excessive secretions. Unclear if prior degree of treatment still required.  -no current sign of pna  -Cont. Nebs q6 and 3% hypertonic saline 4ml BID for now, see above      noted transient hypothermia and tachycardia which resolved  possible aspiration event - will monitor  check CXR...........    consider CT if recurrent issues.     Problem/Plan - 5:  ·  Problem: Constipation.   ·  Plan: -Cont. senna and miralax  -Needs laxative and/or milk of magnesia if no BM.     Problem/Plan - 6:  ·  Problem: DVT prophylaxis.   ·  Plan: DVT PP -Lovenox daily.     Problem/Plan - 7:  ·  Problem: Decubitus ulcer of right heel, stage 3.   ·  Plan: wound pt apprec  dressed as per podiatry   -R heel wound to subQ, no periwound erythema, no malodor, no drainage, no acute signs of infection  -No indication for wound culture as there are no acute signs of infection  -Plan for local wound care, santyl collagenase w/ allevyn pads daily  -Please offload bilateral heels w/ z flow booties         56F w/ pmhx of cerebral palsy (non-verbal, moans, bedbound at baseline), profound intellectual disability, seizure d/o, constipation, multiple admissions for aspiration PNA, (Pseudomonas & MRSA in sputum) requiring intubation, dysphagia s/p PEG w/ 2 recent admissions for issues related with aspiration p/w PEG tube malfunction. [facility] for [facility]ged tube earlier today. Got anti-epileptics in AM prior to clogging. Family also requesting new Rx for liquid form of calcium carbonate to prevent clogging. ER states they were also unable to deflate balloon. Tube also could not be flushed. As per documentation, ED team spoke to IR resident, will try to get on schedule.     In ER: Given LR  Overall patient admitted for clogged peg tube , s/p peg tube replacement on 5/2 . Tube feedings resumed and at goal rate . Ptn had cough and concerned for aspiration , chest xray ordered and show no infection      Problem/Plan - 2:  ·  Problem: Cerebral palsy.   ·  Plan: type unspecified  Complete immobility due to severe physical disability or frailty  Non-verbal, moans, bedbound at baseline, profound intellectual disability    -Normal feeds are Jevity 1.5 ELIZABETH 4 cans to run at 50ccs/hr for 18 hours for total 900ccs. Flush 25/hr for 18hrs, total 450. Flush with 60ccs water before and after. Nutrition consult.       Problem/Plan - 4:  ·  Problem: Pneumonia, aspiration.   ·  Plan: Issues with aspiration during admissions earlier this year and excessive secretions. Unclear if prior degree of treatment still required.  -no current sign of pna  -Cont. Nebs q6 and 3% hypertonic saline 4ml BID for now, see above      noted transient hypothermia and tachycardia which resolved  possible aspiration event - will monitor  xray negative ......    consider CT if recurrent issues.     Problem/Plan - 5:  ·  Problem: Constipation.   ·  Plan: -Cont. senna and miralax  -Needs laxative and/or milk of magnesia if no BM.     Problem/Plan - 6:  ·  Problem: DVT prophylaxis.   ·  Plan: DVT PP -Lovenox daily.     Problem/Plan - 7:  ·  Problem: Decubitus ulcer of right heel, stage 3.   ·  Plan: wound pt apprec  dressed as per podiatry   -R heel wound to subQ, no periwound erythema, no malodor, no drainage, no acute signs of infection  -No indication for wound culture as there are no acute signs of infection  -Plan for local wound care, santyl collagenase w/ allevyn pads daily  -Please offload bilateral heels w/ z flow booties         56F w/ pmhx of cerebral palsy (non-verbal, moans, bedbound at baseline), profound intellectual disability, seizure d/o, constipation, multiple admissions for aspiration PNA, (Pseudomonas & MRSA in sputum) requiring intubation, dysphagia s/p PEG w/ 2 recent admissions for issues related with aspiration p/w PEG tube malfunction. [facility] in [facility] for [facility]ged tube earlier today. Got anti-epileptics in AM prior to clogging. Family also requesting new Rx for liquid form of calcium carbonate to prevent clogging. ER states they were also unable to deflate balloon. Tube also could not be flushed. As per documentation, ED team spoke to IR resident, will try to get on schedule.     In ER: Given LR  Overall patient admitted for clogged peg tube , s/p peg tube replacement on 5/2 . Tube feedings resumed and at goal rate . Ptn had cough and concerned for aspiration , chest xray ordered and show no infection      Problem/Plan - 2:  ·  Problem: Cerebral palsy.   ·  Plan: type unspecified  Complete immobility due to severe physical disability or frailty  Non-verbal, moans, bedbound at baseline, profound intellectual disability    -Normal feeds are Jevity 1.5 ELIZABETH 4 cans to run at 50ccs/hr for 18 hours for total 900ccs. Flush 25/hr for 18hrs, total 450. Flush with 60ccs water before and after. Nutrition consult.       Problem/Plan - 4:  ·  Problem: Pneumonia, aspiration.   ·  Plan: Issues with aspiration during admissions earlier this year and excessive secretions. Unclear if prior degree of treatment still required.  -no current sign of pna  -Cont. Nebs q6 and 3% hypertonic saline 4ml BID for now, see above      noted transient hypothermia and tachycardia which resolved  possible aspiration event - will monitor  xray negative ......    consider CT if recurrent issues.     Problem/Plan - 5:  ·  Problem: Constipation.   ·  Plan: -Cont. senna and miralax  -Needs laxative and/or milk of magnesia if no BM.     Problem/Plan - 6:  ·  Problem: DVT prophylaxis.   ·  Plan: DVT PP -Lovenox daily.     Problem/Plan - 7:  ·  Problem: Decubitus ulcer of right heel, stage 3.   ·  Plan: wound pt apprec  dressed as per podiatry   -R heel wound to subQ, no periwound erythema, no malodor, no drainage, no acute signs of infection  -No indication for wound culture as there are no acute signs of infection  -Plan for local wound care, santyl collagenase w/ allevyn pads daily  -Please offload bilateral heels w/ z flow booties   Pt is medically stable for discharge back to [facility]          56F w/ pmhx of cerebral palsy (non-verbal, moans, bedbound at baseline), profound intellectual disability, seizure d/o, constipation, multiple admissions for aspiration PNA, (Pseudomonas & MRSA in sputum) requiring intubation, dysphagia s/p PEG w/ 2 recent admissions for issues related with aspiration p/w PEG tube malfunction. [facility] in [facility] for [facility]ged tube earlier today. Got anti-epileptics in AM prior to clogging. Family also requesting new Rx for liquid form of calcium carbonate to prevent clogging. ER states they were also unable to deflate balloon. Tube also could not be flushed. As per documentation, ED team spoke to IR resident, will try to get on schedule.     In ER: Given LR  Overall patient admitted for clogged peg tube , s/p peg tube replacement on 5/2 . Tube feedings resumed and at goal rate . Ptn had cough and concerned for aspiration , chest xray ordered and show no infection      Problem/Plan - 2:  ·  Problem: Cerebral palsy.   ·  Plan: type unspecified  Complete immobility due to severe physical disability or frailty  Non-verbal, moans, bedbound at baseline, profound intellectual disability    -Normal feeds are Jevity 1.5 ELIZABETH 4 cans to run at 55ccs/hr for 18 hours for total 900ccs. Flush 25/hr for 18hrs, total 450. Flush with 60ccs water before and after. Nutrition consult.       Problem/Plan - 4:  ·  Problem: Pneumonia, aspiration.   ·  Plan: Issues with aspiration during admissions earlier this year and excessive secretions. Unclear if prior degree of treatment still required.  -no current sign of pna  -Cont. Nebs q6 and 3% hypertonic saline 4ml BID for now, see above      noted transient hypothermia and tachycardia which resolved  possible aspiration event - will monitor  xray negative ......    consider CT if recurrent issues.     Problem/Plan - 5:  ·  Problem: Constipation.   ·  Plan: -Cont. senna and miralax  -Needs laxative and/or milk of magnesia if no BM.     Problem/Plan - 6:  ·  Problem: DVT prophylaxis.   ·  Plan: DVT PP -Lovenox daily.     Problem/Plan - 7:  ·  Problem: Decubitus ulcer of right heel, stage 3.   ·  Plan: wound pt apprec  dressed as per podiatry   -R heel wound to subQ, no periwound erythema, no malodor, no drainage, no acute signs of infection  -No indication for wound culture as there are no acute signs of infection  -Plan for local wound care, santyl collagenase w/ allevyn pads daily  -Please offload bilateral heels w/ z flow booties   Pt is medically stable for discharge back to [facility]          56F w/ pmhx of cerebral palsy (non-verbal, moans, bedbound at baseline), profound intellectual disability, seizure d/o, constipation, multiple admissions for aspiration PNA, (Pseudomonas & MRSA in sputum) requiring intubation, dysphagia s/p PEG w/ 2 recent admissions for issues related with aspiration p/w PEG tube malfunction. [facility] tube earlier today. Got anti-epileptics in AM prior to clogging. Family also requesting new Rx for liquid form of calcium carbonate to prevent clogging. ER states they were also unable to deflate balloon. Tube also could not be flushed. As per documentation, ED team spoke to IR resident, will try to get on schedule.     In ER: Given LR  Overall patient admitted for clogged peg tube , s/p peg tube replacement on 5/2 . Tube feedings resumed and at goal rate . Ptn had cough and concerned for aspiration , chest xray ordered and show no infection     Problem 1 - Dislodged PEG  Replaced tolerating goal feeds       Problem/Plan - 2:  ·  Problem: Cerebral palsy.   ·  Plan: type unspecified  Complete immobility due to severe physical disability or frailty  Non-verbal, moans, bedbound at baseline, profound intellectual disability    -Normal feeds are Jevity 1.5 ELIZABETH 4 cans to run at 55ccs/hr for 18 hours for total 900ccs. Flush 25/hr for 18hrs, total 450. Flush with 60ccs water before and after. Nutrition consult.       Problem/Plan - 4:  ·  Problem: Pneumonia, aspiration - RULED OUT  ·  Plan: Issues with aspiration during admissions earlier this year and excessive secretions. Unclear if prior degree of treatment still required.  -no current sign of pna  -Cont. Nebs q6 and 3% hypertonic saline 4ml BID for now, see above      noted transient hypothermia and tachycardia which resolved  possible aspiration event - will monitor  xray negative ......    consider CT if recurrent issues.     Problem/Plan - 5:  ·  Problem: Constipation.   ·  Plan: -Cont. senna and miralax  -Needs laxative and/or milk of magnesia if no BM.     Problem/Plan - 6:  ·  Problem: DVT prophylaxis.   ·  Plan: DVT PP -Lovenox daily.     Problem/Plan - 7:  ·  Problem: Decubitus ulcer of right heel, stage 3.   ·  Plan: wound pt apprec  dressed as per podiatry   -R heel wound to subQ, no periwound erythema, no malodor, no drainage, no acute signs of infection  -No indication for wound culture as there are no acute signs of infection  -Plan for local wound care, santyl collagenase w/ allevyn pads daily  -Please offload bilateral heels w/ z flow booties   Pt is medically stable for discharge back to [facility]

## 2022-05-03 NOTE — PROVIDER CONTACT NOTE (OTHER) - ASSESSMENT
A&Ox0. VSS. Nonverbal. 5 minutes after feeding rate increased, pt coughing a lot and appeared to not be tolerating. I sat pt up and coughing resolved after a minute

## 2022-05-03 NOTE — CONSULT NOTE ADULT - SUBJECTIVE AND OBJECTIVE BOX
Podiatry pager #: [phone]/ [phone]    Patient is a 57y old  Female who presents with a chief complaint of Clogged PEG tube (03 May 2022 14:09)      HPI:  56F w/ pmhx of cerebral palsy (non-verbal, moans, bedbound at baseline), profound intellectual disability, seizure d/o, constipation, multiple admissions for aspiration PNA, (Pseudomonas & MRSA in sputum) requiring intubation, dysphagia s/p PEG w/ 2 recent admissions for issues related with aspiration p/w PEG tube malfunction. [facility] earlier today. Got anti-epileptics in AM prior to clogging. Family also requesting new Rx for liquid form of calcium carbonate to prevent clogging. ER states they were also unable to deflate balloon. Tube also could not be flushed. As per documentation, ED team spoke to IR resident, will try to get on schedule.     In ER: Given LR (30 Apr 2022 23:11)      PAST MEDICAL & SURGICAL HISTORY:  Cerebral palsy    Seizure disorder    Constipation    Intellectual disability    Asthma    Gastrostomy tube dependent        MEDICATIONS  (STANDING):  albuterol/ipratropium for Nebulization 3 milliLiter(s) Nebulizer every 6 hours  buDESOnide    Inhalation Suspension 0.5 milliGRAM(s) Inhalation two times a day  dextrose 10% + sodium chloride 0.9%. 1000 milliLiter(s) (50 mL/Hr) IV Continuous <Continuous>  dextrose 50% Injectable 25 milliLiter(s) IV Push once  enoxaparin Injectable 40 milliGRAM(s) SubCutaneous every 24 hours  lacosamide IVPB 200 milliGRAM(s) IV Intermittent <User Schedule>  levETIRAcetam  IVPB 500 milliGRAM(s) IV Intermittent every 12 hours  sodium chloride 3%  Inhalation 4 milliLiter(s) Inhalation every 12 hours  valproate sodium IVPB 750 milliGRAM(s) IV Intermittent three times a day  vitamin A &amp; D Ointment 1 Application(s) Topical daily    MEDICATIONS  (PRN):  ondansetron Injectable 4 milliGRAM(s) IV Push every 8 hours PRN Nausea and/or Vomiting      Allergies    Topamax (Unknown)    Intolerances        VITALS:    Vital Signs Last 24 Hrs  T(C): 36.3 (03 May 2022 10:07), Max: 36.3 (03 May 2022 10:07)  T(F): 97.3 (03 May 2022 10:07), Max: 97.3 (03 May 2022 10:07)  HR: 80 (03 May 2022 10:07) (78 - 82)  BP: 128/83 (03 May 2022 10:07) (128/83 - 136/93)  BP(mean): --  RR: 18 (03 May 2022 10:07) (18 - 18)  SpO2: 94% (03 May 2022 10:07) (92% - 96%)    LABS:                          12.2   5.64  )-----------( 146      ( 02 May 2022 07:10 )             38.7       05-02    141  |  103  |  19  ----------------------------<  63<L>  4.3   |  27  |  0.49<L>    Ca    8.5      02 May 2022 07:03    TPro  6.3  /  Alb  2.6<L>  /  TBili  0.2  /  DBili  x   /  AST  66<H>  /  ALT  26  /  AlkPhos  68  05-02      CAPILLARY BLOOD GLUCOSE      POCT Blood Glucose.: 124 mg/dL (03 May 2022 11:45)  POCT Blood Glucose.: 80 mg/dL (03 May 2022 05:38)  POCT Blood Glucose.: 74 mg/dL (02 May 2022 23:46)  POCT Blood Glucose.: 80 mg/dL (02 May 2022 21:37)  POCT Blood Glucose.: 73 mg/dL (02 May 2022 16:43)          LOWER EXTREMITY PHYSICAL EXAM:    Vascular: DP/PT 1/4, B/L, CFT <3 seconds B/L, Temperature gradient WNL, B/L.   Neuro: Epicritic sensation diminished to the level of digits, B/L.  Musculoskeletal/Ortho: lower extremity contracture  Skin: R heel wound to subQ, no periwound erythema, no malodor, no drainage, no acute signs of infection    RADIOLOGY & ADDITIONAL STUDIES:    
Vascular & Interventional Radiology Brief Consult Note    Evaluate for Procedure: Gastrostomy tube exchange    HPI: 57F w/ pmhx of cerebral palsy (non-verbal, moans, bedbound at baseline), profound intellectual disability, seizure d/o, constipation, multiple admissions for aspiration PNA, (Pseudomonas & MRSA in sputum) requiring intubation, dysphagia s/p PEG w/ 2 recent admissions for issues related with aspiration p/w gastrostomy tube malfunction    Allergies: Topamax (Unknown)    Medications (Abx/Cardiac/Anticoagulation/Blood Products)  enoxaparin Injectable: 40 milliGRAM(s) SubCutaneous (05-01 @ 11:14)    Data:  152.4  T(C): 36.4  HR: 86  BP: 127/63  RR: 20  SpO2: 98%    -WBC 6.84 / HgB 14.4 / Hct 44.6 / Plt 163  -Na 135 / Cl 97 / BUN 24 / Glucose 79  -K 4.7 / CO2 26 / Cr 0.47  -ALT 23 / Alk Phos 78 / T.Bili 0.3  -INR1.02    Imaging: reviewed    Plan:  - Can attempt gastrostomy tube replacement tomorrow, Monday 5/2.  - Please place order for IR Procedure, approving attending Dr. Camara  - maintain active covid screening

## 2022-05-03 NOTE — CDI QUERY NOTE - NSCDIOTHERTXTBX_GEN_ALL_CORE_HH
The patient presented with clogged PEG tube which was replaced.    1. Per Nursing, Physical Therapy and Nutrition notes, the patient also has Right Heel stage 3 decubitus ulcer.  Please verify and document this condition in your progress note, if applicable and also clarify if this was present on admission, if known?  = The patient has right heel decubitus ulcer stage 3 present on admission.  = Other.  = Unable to determine.    2. The patient has cerebral palsy, bedbound at baseline.  Per nursing flow sheet, the patient needs total assistance in all daily activities.  Please clarify the specific medical diagnosis for this condition, if known?  = Cerebral palsy (please specify, if known; i.e spastic, quadriplegic, etc)   = Functional quadriplegia,  = Complete immobility due to severe physical disability or frailty,  = Congenital spastic quadriplegia,  = Neurological quadriplegia,  = Other,  = Unable to determine.      Thank you

## 2022-05-03 NOTE — PROVIDER CONTACT NOTE (OTHER) - SITUATION
pt is NPO. pt Q6 fingerstick is 71, pt is asymptomatic, VSS, no s/s of chest pain or n/v/d
Pt started on jevity 1.5 at 20mL/hr at 2300. As per order, I increased to 30 at 0300. Shortly after, pt coughing a lot. Nonverbal but appeared to not be tolerating feeds well

## 2022-05-03 NOTE — CONSULT NOTE ADULT - ASSESSMENT
58yo presents w/ R heel wound  -pt seen and examined  -Afebrile, no leukocytosis  -R heel wound to subQ, no periwound erythema, no malodor, no drainage, no acute signs of infection  -No indication for wound culture as there are no acute signs of infection  -Plan for local wound care, santyl collagenase w/ allevyn pads daily  -Please offload bilateral heels w/ z flow booties  -Discussed w/ attending

## 2022-05-03 NOTE — DISCHARGE NOTE PROVIDER - NSDCMRMEDTOKEN_GEN_ALL_CORE_FT
acetaminophen 160 mg/5 mL oral suspension: 650 milliliter(s) by gastrostomy tube every 6 hours, As Needed - 3)  albuterol 2.5 mg/3 mL (0.083%) inhalation solution: 3 milliliter(s) inhaled every 6 hours, As Needed for shortness of breath/wheezing  Bactrim Pediatric 200 mg-40 mg/5 mL oral suspension: 10 milliliter(s) orally once a day  budesonide: 0.5 milligram(s) inhaled 2 times a day  calcium carbonate 500 mg (200 mg elemental calcium) oral tablet, chewable: 1 tab(s) by gastrostomy tube once a day  ferrous sulfate 300 mg/5 mL (60 mg/5 mL elemental iron) oral liquid: 5 milliliter(s) by PEG tube once a day  Fosamax 70 mg oral tablet: 1 tab(s) orally once a week  ketoconazole 2% topical shampoo: Apply topically to affected area Tuesday, Thursday, Saturday  lacosamide 10 mg/mL oral solution: 200 milligram(s) by gastrostomy tube 3 times a day at 0700 1500  2200    levETIRAcetam 100 mg/mL oral solution: 6 milliliter(s) by gastrostomy tube 2 times a day  Multiple Vitamins with Minerals oral tablet: 1 tab(s) by gastrostomy tube once a day  scopolamine: 1 patch transdermal every 72 hours  senna oral tablet: 2 tab(s) by gastrostomy tube once a day (at bedtime)  sodium biphosphate-sodium phosphate 19 g-7 g rectal enema: 1 each rectal once, As needed, constipation, on day 3 of no bowel movement  sodium chloride 3% inhalation solution: 4 milliliter(s) inhaled every 12 hours  valproic acid 250 mg/5 mL oral liquid: 750 milligram(s) by gastrostomy tube 3 times a day  vitamin A and D topical ointment: 1 application topically once a day to affected area    acetaminophen 160 mg/5 mL oral suspension: 650 milliliter(s) by gastrostomy tube every 6 hours, As Needed - 3)  albuterol 2.5 mg/3 mL (0.083%) inhalation solution: 3 milliliter(s) inhaled every 6 hours, As Needed for shortness of breath/wheezing  budesonide: 0.5 milligram(s) inhaled 2 times a day  calcium carbonate 500 mg (200 mg elemental calcium) oral tablet, chewable: 1 tab(s) by gastrostomy tube once a day  collagenase 250 units/g topical ointment: 1 application topically once a day  Fosamax 70 mg oral tablet: 1 tab(s) orally once a week  lacosamide 10 mg/mL oral solution: 200 milligram(s) by gastrostomy tube 3 times a day at 0700 1500  2200    levETIRAcetam 100 mg/mL oral solution: 5 milliliter(s) by gastrostomy tube 2 times a day  Multiple Vitamins with Minerals oral tablet: 1 tab(s) by gastrostomy tube once a day  senna oral tablet: 2 tab(s) by gastrostomy tube once a day (at bedtime)  sodium chloride 3% inhalation solution: 4 milliliter(s) inhaled every 12 hours  valproic acid 250 mg/5 mL oral liquid: 750 milligram(s) by gastrostomy tube 3 times a day  vitamin A and D topical ointment: 1 application topically once a day to affected area

## 2022-05-03 NOTE — PROGRESS NOTE ADULT - SUBJECTIVE AND OBJECTIVE BOX
Interventional Radiology Follow-Up Note    This is a 57y Female s/p G-tube replacement on 5/2 in Interventional Radiology with Dr. Greenberg.     S: Patient seen and examined @ bedside. No complaints offered.     Medication:  enoxaparin Injectable: (05-02)    Vitals:  T(F): 97.1, Max: 100.1 (08:38)  HR: 78  BP: 136/93  RR: 18  SpO2: 92%    Physical Exam:  General: Nontoxic, in NAD.   Abdomen: soft, NTND, no peritoneal signs. G-tube in place, retention disc flush against skin.     LABS:      Assessment/Plan:  57y Female admitted with Gastrostomy malfunction now s/p G-tube replacement on 5/2 in Interventional Radiology with Dr. Greenberg.     -Ok to us g-tube per clinical service  -Please flush tube with 20cc NS before and after each individual use and q8hr with continuous feeds.   -continue global management per primary team   -will sign off.   -Please call IR at extension [phone] with any questions, concerns, or issues regarding above.      Gia Mcbride PA-C  Available on teams

## 2022-05-03 NOTE — PROGRESS NOTE ADULT - SUBJECTIVE AND OBJECTIVE BOX
[facility] Division of Hospital Medicine  Eladio Adams MD  Pager (M-F, 8B-4E): [phone]  Other Times:  [phone]    Patient is a 57y old  Female who presents with a chief complaint of Clogged PEG tube (03 May 2022 08:20)    SUBJECTIVE / OVERNIGHT EVENTS: pt unable to cooperate with interview due to cerebral palsy. s/p peg replacement. did not tolerate feeds overnight - restarting now   ADDITIONAL REVIEW OF SYSTEMS:    MEDICATIONS  (STANDING):  albuterol/ipratropium for Nebulization 3 milliLiter(s) Nebulizer every 6 hours  buDESOnide    Inhalation Suspension 0.5 milliGRAM(s) Inhalation two times a day  dextrose 10% + sodium chloride 0.9%. 1000 milliLiter(s) (50 mL/Hr) IV Continuous <Continuous>  dextrose 50% Injectable 25 milliLiter(s) IV Push once  enoxaparin Injectable 40 milliGRAM(s) SubCutaneous every 24 hours  lacosamide IVPB 200 milliGRAM(s) IV Intermittent <User Schedule>  levETIRAcetam  IVPB 500 milliGRAM(s) IV Intermittent every 12 hours  sodium chloride 3%  Inhalation 4 milliLiter(s) Inhalation every 12 hours  valproate sodium IVPB 750 milliGRAM(s) IV Intermittent three times a day  vitamin A &amp; D Ointment 1 Application(s) Topical daily    MEDICATIONS  (PRN):  ondansetron Injectable 4 milliGRAM(s) IV Push every 8 hours PRN Nausea and/or Vomiting      CAPILLARY BLOOD GLUCOSE      POCT Blood Glucose.: 124 mg/dL (03 May 2022 11:45)  POCT Blood Glucose.: 80 mg/dL (03 May 2022 05:38)  POCT Blood Glucose.: 74 mg/dL (02 May 2022 23:46)  POCT Blood Glucose.: 80 mg/dL (02 May 2022 21:37)  POCT Blood Glucose.: 73 mg/dL (02 May 2022 16:43)    I&O's Summary    02 May 2022 07:01  -  03 May 2022 07:00  --------------------------------------------------------  IN: 0 mL / OUT: 0 mL / NET: 0 mL    03 May 2022 07:01  -  03 May 2022 14:09  --------------------------------------------------------  IN: 0 mL / OUT: 0 mL / NET: 0 mL        PHYSICAL EXAM:  Vital Signs Last 24 Hrs  T(C): 36.3 (03 May 2022 10:07), Max: 36.3 (03 May 2022 10:07)  T(F): 97.3 (03 May 2022 10:07), Max: 97.3 (03 May 2022 10:07)  HR: 80 (03 May 2022 10:07) (78 - 82)  BP: 128/83 (03 May 2022 10:07) (128/83 - 136/93)  BP(mean): --  RR: 18 (03 May 2022 10:07) (18 - 18)  SpO2: 94% (03 May 2022 10:07) (92% - 96%)  GENERAL: NAD, well-developed  HEAD:  Atraumatic, Normocephalic  EYES:  conjunctiva and sclera clear  NECK: Supple, No JVD  CHEST/LUNG: Clear to auscultation bilaterally; No wheeze  HEART: Regular rate and rhythm; No murmurs, rubs, or gallops  ABDOMEN: Soft, Nontender, Nondistended; Bowel sounds present. PEG in place, No surrounding warmth, erythema or fluctuance   EXTREMITIES:  2+ Peripheral Pulses, No clubbing, cyanosis, or edema  PSYCH: AAOx0 nonverbal  NEUROLOGY: nonverbal, makes eye contact but cannot cooperate w exam  SKIN: dressed ulcer on R heel    LABS:                        12.2   5.64  )-----------( 146      ( 02 May 2022 07:10 )             38.7     05-02    141  |  103  |  19  ----------------------------<  63<L>  4.3   |  27  |  0.49<L>    Ca    8.5      02 May 2022 07:03    TPro  6.3  /  Alb  2.6<L>  /  TBili  0.2  /  DBili  x   /  AST  66<H>  /  ALT  26  /  AlkPhos  68  05-02    Urinalysis Basic - ( 02 May 2022 17:57 )    Color: Yellow / Appearance: Clear / S.029 / pH: x  Gluc: x / Ketone: Moderate  / Bili: Negative / Urobili: 3 mg/dL   Blood: x / Protein: Trace / Nitrite: Negative   Leuk Esterase: Negative / RBC: 3 /hpf / WBC 0 /HPF   Sq Epi: x / Non Sq Epi: 0 /hpf / Bacteria: Few        Culture - Blood (collected 02 May 2022 06:28)  Source: .Blood Blood-Peripheral  Preliminary Report (03 May 2022 07:01):    No growth to date.    Culture - Blood (collected 01 May 2022 23:06)  Source: .Blood Blood-Peripheral  Preliminary Report (03 May 2022 01:02):    No growth to date.        RADIOLOGY & ADDITIONAL TESTS:  Results Reviewed:   Imaging Personally Reviewed:  Electrocardiogram Personally Reviewed:    COORDINATION OF CARE:  Care Discussed with Consultants/Other Providers [Y/N]:  Prior or Outpatient Records Reviewed [Y/N]:

## 2022-05-03 NOTE — DISCHARGE NOTE PROVIDER - NSDCPNSUBOBJ_GEN_ALL_CORE
Patient seen and examined on 5/6/22 at 10:30AM   no events, tolerated goal feeds  stable for return to group home  covid neg  ambulance arranged  35 minutes spent orchestrating discharge

## 2022-05-04 LAB
GLUCOSE BLDC GLUCOMTR-MCNC: 140 MG/DL — HIGH (ref 70–99)
GLUCOSE BLDC GLUCOMTR-MCNC: 154 MG/DL — HIGH (ref 70–99)
GLUCOSE BLDC GLUCOMTR-MCNC: 159 MG/DL — HIGH (ref 70–99)
GLUCOSE BLDC GLUCOMTR-MCNC: 165 MG/DL — HIGH (ref 70–99)
GLUCOSE BLDC GLUCOMTR-MCNC: 249 MG/DL — HIGH (ref 70–99)

## 2022-05-04 PROCEDURE — 71045 X-RAY EXAM CHEST 1 VIEW: CPT | Mod: 26

## 2022-05-04 PROCEDURE — 99233 SBSQ HOSP IP/OBS HIGH 50: CPT

## 2022-05-04 RX ADMIN — SODIUM CHLORIDE 50 MILLILITER(S): 9 INJECTION, SOLUTION INTRAVENOUS at 13:26

## 2022-05-04 RX ADMIN — Medication 3 MILLILITER(S): at 03:00

## 2022-05-04 RX ADMIN — Medication 3 MILLILITER(S): at 17:49

## 2022-05-04 RX ADMIN — Medication 50 MILLIGRAM(S): at 13:24

## 2022-05-04 RX ADMIN — Medication 50 MILLIGRAM(S): at 04:57

## 2022-05-04 RX ADMIN — Medication 1 APPLICATION(S): at 13:15

## 2022-05-04 RX ADMIN — LACOSAMIDE 140 MILLIGRAM(S): 50 TABLET ORAL at 15:20

## 2022-05-04 RX ADMIN — SODIUM CHLORIDE 4 MILLILITER(S): 9 INJECTION INTRAMUSCULAR; INTRAVENOUS; SUBCUTANEOUS at 17:48

## 2022-05-04 RX ADMIN — LACOSAMIDE 140 MILLIGRAM(S): 50 TABLET ORAL at 04:58

## 2022-05-04 RX ADMIN — ENOXAPARIN SODIUM 40 MILLIGRAM(S): 100 INJECTION SUBCUTANEOUS at 13:26

## 2022-05-04 RX ADMIN — Medication 0.5 MILLIGRAM(S): at 05:03

## 2022-05-04 RX ADMIN — SODIUM CHLORIDE 4 MILLILITER(S): 9 INJECTION INTRAMUSCULAR; INTRAVENOUS; SUBCUTANEOUS at 05:07

## 2022-05-04 RX ADMIN — Medication 0.5 MILLIGRAM(S): at 17:48

## 2022-05-04 RX ADMIN — LACOSAMIDE 140 MILLIGRAM(S): 50 TABLET ORAL at 23:04

## 2022-05-04 RX ADMIN — Medication 3 MILLILITER(S): at 05:03

## 2022-05-04 RX ADMIN — Medication 50 MILLIGRAM(S): at 23:04

## 2022-05-04 RX ADMIN — Medication 1 APPLICATION(S): at 13:25

## 2022-05-04 RX ADMIN — Medication 3 MILLILITER(S): at 13:25

## 2022-05-04 RX ADMIN — LEVETIRACETAM 400 MILLIGRAM(S): 250 TABLET, FILM COATED ORAL at 17:48

## 2022-05-04 RX ADMIN — LEVETIRACETAM 400 MILLIGRAM(S): 250 TABLET, FILM COATED ORAL at 04:58

## 2022-05-04 NOTE — PROGRESS NOTE ADULT - SUBJECTIVE AND OBJECTIVE BOX
[facility] Division of Hospital Medicine  Eladio Adams MD  Contact MGISELLE, 8A-5P through [employer]  Other Times:  [phone]    Patient is a 57y old  Female who presents with a chief complaint of Clogged PEG tube (03 May 2022 14:35)      SUBJECTIVE / OVERNIGHT EVENTS: noted tachy and hypothermia, some coughing, per nursing does   ADDITIONAL REVIEW OF SYSTEMS:    MEDICATIONS  (STANDING):  albuterol/ipratropium for Nebulization 3 milliLiter(s) Nebulizer every 6 hours  buDESOnide    Inhalation Suspension 0.5 milliGRAM(s) Inhalation two times a day  collagenase Ointment 1 Application(s) Topical daily  dextrose 10% + sodium chloride 0.9%. 1000 milliLiter(s) (50 mL/Hr) IV Continuous <Continuous>  dextrose 50% Injectable 25 milliLiter(s) IV Push once  enoxaparin Injectable 40 milliGRAM(s) SubCutaneous every 24 hours  lacosamide IVPB 200 milliGRAM(s) IV Intermittent <User Schedule>  levETIRAcetam  IVPB 500 milliGRAM(s) IV Intermittent every 12 hours  sodium chloride 3%  Inhalation 4 milliLiter(s) Inhalation every 12 hours  valproate sodium IVPB 750 milliGRAM(s) IV Intermittent three times a day  vitamin A &amp; D Ointment 1 Application(s) Topical daily    MEDICATIONS  (PRN):  ondansetron Injectable 4 milliGRAM(s) IV Push every 8 hours PRN Nausea and/or Vomiting      CAPILLARY BLOOD GLUCOSE      POCT Blood Glucose.: 159 mg/dL (04 May 2022 11:59)  POCT Blood Glucose.: 154 mg/dL (04 May 2022 07:44)  POCT Blood Glucose.: 140 mg/dL (04 May 2022 00:08)  POCT Blood Glucose.: 151 mg/dL (03 May 2022 17:59)    I&O's Summary    03 May 2022 07:01  -  04 May 2022 07:00  --------------------------------------------------------  IN: 0 mL / OUT: 1100 mL / NET: -1100 mL    04 May 2022 07:01  -  04 May 2022 14:49  --------------------------------------------------------  IN: 0 mL / OUT: 200 mL / NET: -200 mL        PHYSICAL EXAM:  Vital Signs Last 24 Hrs  T(C): 36.1 (04 May 2022 08:31), Max: 38 (03 May 2022 19:00)  T(F): 97 (04 May 2022 08:31), Max: 100.4 (03 May 2022 19:00)  HR: 95 (04 May 2022 08:31) (75 - 123)  BP: 120/65 (04 May 2022 08:31) (120/65 - 149/108)  BP(mean): --  RR: 18 (04 May 2022 08:31) (18 - 18)  SpO2: 95% (04 May 2022 08:31) (95% - 97%)  CONSTITUTIONAL: NAD, well-developed, well-groomed  EYES: conjunctiva and sclera clear  ENMT: Moist oral mucosa, no pharyngeal injection or exudates; normal dentition  RESPIRATORY: Normal respiratory effort; lungs are clear to auscultation bilaterally  CARDIOVASCULAR: Regular rate and rhythm, normal S1 and S2, no murmur; No lower extremity edema;   ABDOMEN: Nontender to palpation, normoactive bowel sounds, no rebound/guarding;  MUSCULOSKELETAL:  HOWELL no tenderness  PSYCH: A+O to person, place, and time; affect appropriate  NEUROLOGY: grossly nonfocal upper/lower extremity, conversational  SKIN: No rashes; no palpable lesions    LABS:                Urinalysis Basic - ( 02 May 2022 17:57 )    Color: Yellow / Appearance: Clear / S.029 / pH: x  Gluc: x / Ketone: Moderate  / Bili: Negative / Urobili: 3 mg/dL   Blood: x / Protein: Trace / Nitrite: Negative   Leuk Esterase: Negative / RBC: 3 /hpf / WBC 0 /HPF   Sq Epi: x / Non Sq Epi: 0 /hpf / Bacteria: Few        Culture - Blood (collected 02 May 2022 06:28)  Source: .Blood Blood-Peripheral  Preliminary Report (03 May 2022 07:01):    No growth to date.    Culture - Blood (collected 01 May 2022 23:06)  Source: .Blood Blood-Peripheral  Preliminary Report (03 May 2022 01:02):    No growth to date.        RADIOLOGY & ADDITIONAL TESTS:  Results Reviewed:   Imaging Personally Reviewed:  Electrocardiogram Personally Reviewed:    COORDINATION OF CARE:  Care Discussed with Consultants/Other Providers [Y/N]:  Prior or Outpatient Records Reviewed [Y/N]:   [facility] Division of Hospital Medicine  Eladio Adams MD  Contact DARRELL, 8A-5P through [employer] Teams  Other Times:  [phone]    Patient is a 57y old  Female who presents with a chief complaint of Clogged PEG tube (03 May 2022 14:35)      SUBJECTIVE / OVERNIGHT EVENTS: noted tachy and hypothermia, some coughing, per nursing does seem to be tolerating TF at goal rate  ADDITIONAL REVIEW OF SYSTEMS:    MEDICATIONS  (STANDING):  albuterol/ipratropium for Nebulization 3 milliLiter(s) Nebulizer every 6 hours  buDESOnide    Inhalation Suspension 0.5 milliGRAM(s) Inhalation two times a day  collagenase Ointment 1 Application(s) Topical daily  dextrose 10% + sodium chloride 0.9%. 1000 milliLiter(s) (50 mL/Hr) IV Continuous <Continuous>  dextrose 50% Injectable 25 milliLiter(s) IV Push once  enoxaparin Injectable 40 milliGRAM(s) SubCutaneous every 24 hours  lacosamide IVPB 200 milliGRAM(s) IV Intermittent <User Schedule>  levETIRAcetam  IVPB 500 milliGRAM(s) IV Intermittent every 12 hours  sodium chloride 3%  Inhalation 4 milliLiter(s) Inhalation every 12 hours  valproate sodium IVPB 750 milliGRAM(s) IV Intermittent three times a day  vitamin A &amp; D Ointment 1 Application(s) Topical daily    MEDICATIONS  (PRN):  ondansetron Injectable 4 milliGRAM(s) IV Push every 8 hours PRN Nausea and/or Vomiting      CAPILLARY BLOOD GLUCOSE      POCT Blood Glucose.: 159 mg/dL (04 May 2022 11:59)  POCT Blood Glucose.: 154 mg/dL (04 May 2022 07:44)  POCT Blood Glucose.: 140 mg/dL (04 May 2022 00:08)  POCT Blood Glucose.: 151 mg/dL (03 May 2022 17:59)    I&O's Summary    03 May 2022 07:01  -  04 May 2022 07:00  --------------------------------------------------------  IN: 0 mL / OUT: 1100 mL / NET: -1100 mL    04 May 2022 07:01  -  04 May 2022 14:49  --------------------------------------------------------  IN: 0 mL / OUT: 200 mL / NET: -200 mL        PHYSICAL EXAM:  Vital Signs Last 24 Hrs  T(C): 36.1 (04 May 2022 08:31), Max: 38 (03 May 2022 19:00)  T(F): 97 (04 May 2022 08:31), Max: 100.4 (03 May 2022 19:00)  HR: 95 (04 May 2022 08:31) (75 - 123)  BP: 120/65 (04 May 2022 08:31) (120/65 - 149/108)  BP(mean): --  RR: 18 (04 May 2022 08:31) (18 - 18)  SpO2: 95% (04 May 2022 08:31) (95% - 97%)  GENERAL: NAD, well-developed  HEAD:  Atraumatic, Normocephalic  EYES:  conjunctiva and sclera clear  NECK: Supple, No JVD  CHEST/LUNG: Clear to auscultation bilaterally; No wheeze  HEART: Regular rate and rhythm; No murmurs, rubs, or gallops  ABDOMEN: Soft, Nontender, Nondistended; Bowel sounds present. PEG in place, No surrounding warmth, erythema or fluctuance   EXTREMITIES:  2+ Peripheral Pulses, No clubbing, cyanosis, or edema  PSYCH: AAOx0 nonverbal  NEUROLOGY: nonverbal, makes eye contact but cannot cooperate w exam  SKIN: dressed ulcer on R heel    LABS:                Urinalysis Basic - ( 02 May 2022 17:57 )    Color: Yellow / Appearance: Clear / S.029 / pH: x  Gluc: x / Ketone: Moderate  / Bili: Negative / Urobili: 3 mg/dL   Blood: x / Protein: Trace / Nitrite: Negative   Leuk Esterase: Negative / RBC: 3 /hpf / WBC 0 /HPF   Sq Epi: x / Non Sq Epi: 0 /hpf / Bacteria: Few        Culture - Blood (collected 02 May 2022 06:28)  Source: .Blood Blood-Peripheral  Preliminary Report (03 May 2022 07:01):    No growth to date.    Culture - Blood (collected 01 May 2022 23:06)  Source: .Blood Blood-Peripheral  Preliminary Report (03 May 2022 01:02):    No growth to date.        RADIOLOGY & ADDITIONAL TESTS:  Results Reviewed:   Imaging Personally Reviewed:  Electrocardiogram Personally Reviewed:    COORDINATION OF CARE:  Care Discussed with Consultants/Other Providers [Y/N]:  Prior or Outpatient Records Reviewed [Y/N]:

## 2022-05-04 NOTE — PROGRESS NOTE ADULT - PROBLEM SELECTOR PLAN 2
type unspecified  Complete immobility due to severe physical disability or frailty  Non-verbal, moans, bedbound at baseline, profound intellectual disability    -Normal feeds are Jevity 1.5 ELIZABETH 4 cans to run at 50ccs/hr for 18 hours for total 900ccs. Flush 25/hr for 18hrs, total 450. Flush with 60ccs water before and after. Nutrition consult

## 2022-05-05 LAB
GLUCOSE BLDC GLUCOMTR-MCNC: 149 MG/DL — HIGH (ref 70–99)
GLUCOSE BLDC GLUCOMTR-MCNC: 152 MG/DL — HIGH (ref 70–99)
SARS-COV-2 RNA SPEC QL NAA+PROBE: SIGNIFICANT CHANGE UP

## 2022-05-05 PROCEDURE — 99232 SBSQ HOSP IP/OBS MODERATE 35: CPT

## 2022-05-05 RX ORDER — COLLAGENASE CLOSTRIDIUM HIST. 250 UNIT/G
1 OINTMENT (GRAM) TOPICAL
Qty: 0 | Refills: 0 | DISCHARGE
Start: 2022-05-05

## 2022-05-05 RX ADMIN — LEVETIRACETAM 400 MILLIGRAM(S): 250 TABLET, FILM COATED ORAL at 05:31

## 2022-05-05 RX ADMIN — LACOSAMIDE 140 MILLIGRAM(S): 50 TABLET ORAL at 20:58

## 2022-05-05 RX ADMIN — LACOSAMIDE 140 MILLIGRAM(S): 50 TABLET ORAL at 06:52

## 2022-05-05 RX ADMIN — SODIUM CHLORIDE 4 MILLILITER(S): 9 INJECTION INTRAMUSCULAR; INTRAVENOUS; SUBCUTANEOUS at 17:18

## 2022-05-05 RX ADMIN — Medication 50 MILLIGRAM(S): at 22:30

## 2022-05-05 RX ADMIN — Medication 3 MILLILITER(S): at 05:32

## 2022-05-05 RX ADMIN — Medication 3 MILLILITER(S): at 11:44

## 2022-05-05 RX ADMIN — Medication 0.5 MILLIGRAM(S): at 05:32

## 2022-05-05 RX ADMIN — Medication 3 MILLILITER(S): at 17:18

## 2022-05-05 RX ADMIN — Medication 0.5 MILLIGRAM(S): at 17:18

## 2022-05-05 RX ADMIN — ENOXAPARIN SODIUM 40 MILLIGRAM(S): 100 INJECTION SUBCUTANEOUS at 11:48

## 2022-05-05 RX ADMIN — Medication 1 APPLICATION(S): at 11:48

## 2022-05-05 RX ADMIN — SODIUM CHLORIDE 50 MILLILITER(S): 9 INJECTION, SOLUTION INTRAVENOUS at 13:50

## 2022-05-05 RX ADMIN — LEVETIRACETAM 400 MILLIGRAM(S): 250 TABLET, FILM COATED ORAL at 17:13

## 2022-05-05 RX ADMIN — Medication 50 MILLIGRAM(S): at 13:50

## 2022-05-05 RX ADMIN — SODIUM CHLORIDE 4 MILLILITER(S): 9 INJECTION INTRAMUSCULAR; INTRAVENOUS; SUBCUTANEOUS at 05:32

## 2022-05-05 RX ADMIN — Medication 3 MILLILITER(S): at 00:00

## 2022-05-05 RX ADMIN — Medication 50 MILLIGRAM(S): at 05:31

## 2022-05-05 RX ADMIN — Medication 1 APPLICATION(S): at 12:14

## 2022-05-05 RX ADMIN — LACOSAMIDE 140 MILLIGRAM(S): 50 TABLET ORAL at 15:29

## 2022-05-05 NOTE — PROGRESS NOTE ADULT - PROBLEM SELECTOR PLAN 5
-Cont. senna and miralax  -Needs laxative and/or milk of magnesia if no BM

## 2022-05-05 NOTE — PROGRESS NOTE ADULT - PROBLEM SELECTOR PLAN 6
DVT PP -Lovenox daily for now, can consider BID    discussed with NP Mindy     Dispo: d/c back home adam once PEG replaced
DVT PP -Lovenox daily
DVT PP -Lovenox daily for now, can consider BID
DVT PP -Lovenox daily for now, can consider BID
DVT PP -Lovenox daily

## 2022-05-05 NOTE — PROGRESS NOTE ADULT - PROBLEM SELECTOR PLAN 3
Need to replace PEG tube. Will cont. doses as IV for now as able.  -Cont. Keppra 600mg IVPB BID  -Cont. Vimpat IVPB TID  -Cont. valproic acid IVPB TID  -change to po once PEG replaced  -seizure precautions
Need to replace PEG tube. Will cont. doses as IV for now as able.  -Cont. Keppra 600mg IVPB BID  -Cont. Vimpat IVPB TID  -Cont. valproic acid IVPB TID  -change to po once PEG replaced  -seizure precautions
Need to replace PEG tube. Will cont. doses as IV for now as able.  -Cont. Keppra 600mg IVPB BID  -Cont. Vimpat IVPB TID  -Cont. valproic acid IVPB TID  -changed to po as PEG replaced  -seizure precautions

## 2022-05-05 NOTE — PROGRESS NOTE ADULT - NUTRITIONAL ASSESSMENT
This patient has been assessed with a concern for Malnutrition and has been determined to have a diagnosis/diagnoses of Severe protein-calorie malnutrition.    This patient is being managed with:   Diet NPO with Tube Feed-  Tube Feeding Modality: Gastrostomy  Jevity 1.5 Shady (JEVITY1.5RTH)  Total Volume for 24 Hours (mL): 1320  Continuous  Starting Tube Feed Rate {mL per Hour}: 20  Increase Tube Feed Rate by (mL): 10     Every 4 hours  Until Goal Tube Feed Rate (mL per Hour): 55  Tube Feed Duration (in Hours): 24  Tube Feed Start Time: 20:00  Free Water Flush  Bolus   Total Volume per Flush (mL): 200   Frequency: Every 8 Hours   Total Daily Volume of Flush (mL): 300  Entered: May  2 2022  7:54PM    

## 2022-05-05 NOTE — PROGRESS NOTE ADULT - PROBLEM/PLAN-5
DISPLAY PLAN FREE TEXT
You are due for a colon check this year as it has been 5 years for the colon polyp check   Do get the ct of the abdomen  Call or go to ER if worsens  Do take fluids  Use the antibiotic   Do recheck the ABNORMAL BLOOD COUNT IN 4 TO 6 WEEK  Consider the shingle vaccine   See me in 3 months
DISPLAY PLAN FREE TEXT

## 2022-05-05 NOTE — PROGRESS NOTE ADULT - PROBLEM SELECTOR PLAN 1
Unable to flush PEG tube or deflate balloon in ER.   -IR aware and will try to replace tube today vs adam  -NPO  -F/u IR recommendations  -Needs calcium carbonate prescription converted to liquid on discharge  -Resume PO meds via PEG as able
s/p replacement  uptitrate feeds as tolerated  dietary apprec
s/p replacement  feeds at goal  dietary apprec
Unable to flush PEG tube or deflate balloon in ER.   -IR aware and will replace tube today  -NPO  -F/u IR recommendations  -Needs calcium carbonate prescription converted to liquid on discharge  -Resume PO meds via PEG as able
s/p replacement  uptitrate feeds as tolerated  dietary apprec

## 2022-05-05 NOTE — ED PROVIDER NOTE - SEVERE SEPSIS CRITERIA MET YN (MLM)
[Initial] : an initial visit [Cough] : cough [Shortness of Breath] : shortness of breath Sepsis Criteria were met:

## 2022-05-05 NOTE — PROGRESS NOTE ADULT - PROBLEM SELECTOR PROBLEM 1
PEG tube malfunction

## 2022-05-05 NOTE — PROGRESS NOTE ADULT - PROBLEM SELECTOR PLAN 4
Issues with aspiration during admissions earlier this year and excessive secretions. Unclear if prior degree of treatment still required.  -no current sign of pna  -Cont. q4hr oral suctions for now and down titrate frequency as needed  -Cont. Nebs q6 and 3% hypertonic saline 4ml BID for now, see above  -Do not see Scopolamine patch in med rec of attached paperwork
Issues with aspiration during admissions earlier this year and excessive secretions. Unclear if prior degree of treatment still required.  -no current sign of pna  -Cont. q4hr oral suctions for now and down titrate frequency as needed  -Cont. Nebs q6 and 3% hypertonic saline 4ml BID for now, see above  -Do not see Scopolamine patch in med rec of attached paperwork    noted transient hypothermia and tachycardia which resolved  possible aspiration event - will monitor  cxr unremarkable    consider CT if recurrent issue
Issues with aspiration during admissions earlier this year and excessive secretions. Unclear if prior degree of treatment still required.  -no current sign of pna  -Cont. q4hr oral suctions for now and down titrate frequency as needed  -Cont. Nebs q6 and 3% hypertonic saline 4ml BID for now, see above  -Do not see Scopolamine patch in med rec of attached paperwork    noted transient hypothermia and tachycardia which resolved  possible aspiration event - will monitor  check CXR    consider CT if recurrent issues

## 2022-05-05 NOTE — PROGRESS NOTE ADULT - SUBJECTIVE AND OBJECTIVE BOX
[facility] Division of Hospital Medicine  Eladio Adams MD  Contact MGISELLE, 8A-5P through [employer] [place] Teams  Other Times:  [phone]    Patient is a 57y old  Female who presents with a chief complaint of Clogged PEG tube (04 May 2022 14:49)    SUBJECTIVE / OVERNIGHT EVENTS: no events. tolerating feeds  ADDITIONAL REVIEW OF SYSTEMS:    MEDICATIONS  (STANDING):  albuterol/ipratropium for Nebulization 3 milliLiter(s) Nebulizer every 6 hours  buDESOnide    Inhalation Suspension 0.5 milliGRAM(s) Inhalation two times a day  collagenase Ointment 1 Application(s) Topical daily  dextrose 10% + sodium chloride 0.9%. 1000 milliLiter(s) (50 mL/Hr) IV Continuous <Continuous>  dextrose 50% Injectable 25 milliLiter(s) IV Push once  enoxaparin Injectable 40 milliGRAM(s) SubCutaneous every 24 hours  lacosamide IVPB 200 milliGRAM(s) IV Intermittent <User Schedule>  levETIRAcetam  IVPB 500 milliGRAM(s) IV Intermittent every 12 hours  sodium chloride 3%  Inhalation 4 milliLiter(s) Inhalation every 12 hours  valproate sodium IVPB 750 milliGRAM(s) IV Intermittent three times a day  vitamin A &amp; D Ointment 1 Application(s) Topical daily    MEDICATIONS  (PRN):  ondansetron Injectable 4 milliGRAM(s) IV Push every 8 hours PRN Nausea and/or Vomiting      CAPILLARY BLOOD GLUCOSE      POCT Blood Glucose.: 149 mg/dL (05 May 2022 11:38)  POCT Blood Glucose.: 152 mg/dL (05 May 2022 06:28)  POCT Blood Glucose.: 249 mg/dL (04 May 2022 23:39)  POCT Blood Glucose.: 165 mg/dL (04 May 2022 17:58)    I&O's Summary    04 May 2022 07:01  -  05 May 2022 07:00  --------------------------------------------------------  IN: 0 mL / OUT: 200 mL / NET: -200 mL    05 May 2022 07:01  -  05 May 2022 15:31  --------------------------------------------------------  IN: 50 mL / OUT: 0 mL / NET: 50 mL        PHYSICAL EXAM:  Vital Signs Last 24 Hrs  T(C): 36.7 (05 May 2022 09:18), Max: 36.7 (05 May 2022 09:18)  T(F): 98.1 (05 May 2022 09:18), Max: 98.1 (05 May 2022 09:18)  HR: 82 (05 May 2022 09:18) (82 - 96)  BP: 130/91 (05 May 2022 09:18) (107/74 - 130/91)  BP(mean): --  RR: 18 (05 May 2022 09:18) (18 - 18)  SpO2: 98% (05 May 2022 09:18) (90% - 98%)  GENERAL: NAD, well-developed  HEAD:  Atraumatic, Normocephalic  EYES:  conjunctiva and sclera clear  NECK: Supple, No JVD  CHEST/LUNG: Clear to auscultation bilaterally; No wheeze  HEART: Regular rate and rhythm; No murmurs, rubs, or gallops  ABDOMEN: Soft, Nontender, Nondistended; Bowel sounds present. PEG in place, No surrounding warmth, erythema or fluctuance   EXTREMITIES:  2+ Peripheral Pulses, No clubbing, cyanosis, or edema  PSYCH: AAOx0 nonverbal  NEUROLOGY: nonverbal, makes eye contact but cannot cooperate w exam  SKIN: dressed ulcer on R heel    LABS:          RADIOLOGY & ADDITIONAL TESTS:  Results Reviewed:   Imaging Personally Reviewed:  Electrocardiogram Personally Reviewed:    COORDINATION OF CARE:  Care Discussed with Consultants/Other Providers [Y/N]:  Prior or Outpatient Records Reviewed [Y/N]:

## 2022-05-05 NOTE — PROGRESS NOTE ADULT - REASON FOR ADMISSION
Clogged PEG tube

## 2022-05-05 NOTE — PROGRESS NOTE ADULT - PROBLEM SELECTOR PROBLEM 7
Decubitus ulcer of right heel, stage 3

## 2022-05-05 NOTE — PROGRESS NOTE ADULT - PROBLEM SELECTOR PLAN 7
wound pt apprec  dressed  podiatry consulted

## 2022-05-05 NOTE — PROGRESS NOTE ADULT - PROBLEM SELECTOR PROBLEM 4
Pneumonia, aspiration

## 2022-05-06 ENCOUNTER — TRANSCRIPTION ENCOUNTER (OUTPATIENT)
Age: 57
End: 2022-05-06

## 2022-05-06 VITALS
DIASTOLIC BLOOD PRESSURE: 86 MMHG | SYSTOLIC BLOOD PRESSURE: 142 MMHG | OXYGEN SATURATION: 95 % | HEART RATE: 78 BPM | TEMPERATURE: 96 F | RESPIRATION RATE: 18 BRPM

## 2022-05-06 LAB
GLUCOSE BLDC GLUCOMTR-MCNC: 146 MG/DL — HIGH (ref 70–99)
GLUCOSE BLDC GLUCOMTR-MCNC: 157 MG/DL — HIGH (ref 70–99)

## 2022-05-06 PROCEDURE — U0005: CPT

## 2022-05-06 PROCEDURE — 36415 COLL VENOUS BLD VENIPUNCTURE: CPT

## 2022-05-06 PROCEDURE — 96374 THER/PROPH/DIAG INJ IV PUSH: CPT

## 2022-05-06 PROCEDURE — 96375 TX/PRO/DX INJ NEW DRUG ADDON: CPT

## 2022-05-06 PROCEDURE — C9254: CPT

## 2022-05-06 PROCEDURE — 85610 PROTHROMBIN TIME: CPT

## 2022-05-06 PROCEDURE — 85730 THROMBOPLASTIN TIME PARTIAL: CPT

## 2022-05-06 PROCEDURE — 74019 RADEX ABDOMEN 2 VIEWS: CPT

## 2022-05-06 PROCEDURE — U0003: CPT

## 2022-05-06 PROCEDURE — 94640 AIRWAY INHALATION TREATMENT: CPT

## 2022-05-06 PROCEDURE — 99239 HOSP IP/OBS DSCHRG MGMT >30: CPT

## 2022-05-06 PROCEDURE — L8699: CPT

## 2022-05-06 PROCEDURE — 83735 ASSAY OF MAGNESIUM: CPT

## 2022-05-06 PROCEDURE — 49452 REPLACE G-J TUBE PERC: CPT

## 2022-05-06 PROCEDURE — 97162 PT EVAL MOD COMPLEX 30 MIN: CPT

## 2022-05-06 PROCEDURE — 81001 URINALYSIS AUTO W/SCOPE: CPT

## 2022-05-06 PROCEDURE — 85025 COMPLETE CBC W/AUTO DIFF WBC: CPT

## 2022-05-06 PROCEDURE — 71045 X-RAY EXAM CHEST 1 VIEW: CPT

## 2022-05-06 PROCEDURE — 87040 BLOOD CULTURE FOR BACTERIA: CPT

## 2022-05-06 PROCEDURE — 80053 COMPREHEN METABOLIC PANEL: CPT

## 2022-05-06 PROCEDURE — 82962 GLUCOSE BLOOD TEST: CPT

## 2022-05-06 PROCEDURE — 85027 COMPLETE CBC AUTOMATED: CPT

## 2022-05-06 PROCEDURE — C1769: CPT

## 2022-05-06 PROCEDURE — 99285 EMERGENCY DEPT VISIT HI MDM: CPT | Mod: 25

## 2022-05-06 RX ADMIN — SODIUM CHLORIDE 50 MILLILITER(S): 9 INJECTION, SOLUTION INTRAVENOUS at 02:00

## 2022-05-06 RX ADMIN — Medication 3 MILLILITER(S): at 05:14

## 2022-05-06 RX ADMIN — LEVETIRACETAM 400 MILLIGRAM(S): 250 TABLET, FILM COATED ORAL at 05:15

## 2022-05-06 RX ADMIN — LACOSAMIDE 140 MILLIGRAM(S): 50 TABLET ORAL at 06:25

## 2022-05-06 RX ADMIN — SODIUM CHLORIDE 4 MILLILITER(S): 9 INJECTION INTRAMUSCULAR; INTRAVENOUS; SUBCUTANEOUS at 05:14

## 2022-05-06 RX ADMIN — Medication 1 APPLICATION(S): at 10:00

## 2022-05-06 RX ADMIN — Medication 0.5 MILLIGRAM(S): at 05:14

## 2022-05-06 RX ADMIN — Medication 3 MILLILITER(S): at 00:00

## 2022-05-06 RX ADMIN — Medication 50 MILLIGRAM(S): at 05:44

## 2022-05-06 NOTE — DISCHARGE NOTE NURSING/CASE MANAGEMENT/SOCIAL WORK - NSDCPEFALRISK_GEN_ALL_CORE
For information on Fall & Injury Prevention, visit: [email or web address] OR  [email or web address] OR  https://www.cdc.gov/steadi/patient.html

## 2022-05-06 NOTE — DISCHARGE NOTE NURSING/CASE MANAGEMENT/SOCIAL WORK - PATIENT PORTAL LINK FT
You can access the FollowMyHealth Patient Portal offered by [facility] by registering at the following website: [email or web address]. By joining [employer]’s FollowMyHealth portal, you will also be able to view your health information using other applications (apps) compatible with our system.

## 2022-05-07 LAB
CULTURE RESULTS: SIGNIFICANT CHANGE UP
CULTURE RESULTS: SIGNIFICANT CHANGE UP
SPECIMEN SOURCE: SIGNIFICANT CHANGE UP
SPECIMEN SOURCE: SIGNIFICANT CHANGE UP

## 2022-05-10 ENCOUNTER — INPATIENT (INPATIENT)
Facility: HOSPITAL | Age: 57
LOS: 5 days | Discharge: NURSING, REHAB OR LONG-TERM CARE | End: 2022-05-16
Attending: STUDENT IN AN ORGANIZED HEALTH CARE EDUCATION/TRAINING PROGRAM | Admitting: STUDENT IN AN ORGANIZED HEALTH CARE EDUCATION/TRAINING PROGRAM
Payer: MEDICARE

## 2022-05-10 VITALS
SYSTOLIC BLOOD PRESSURE: 159 MMHG | DIASTOLIC BLOOD PRESSURE: 92 MMHG | HEIGHT: 63 IN | HEART RATE: 75 BPM | TEMPERATURE: 98 F | RESPIRATION RATE: 20 BRPM | OXYGEN SATURATION: 94 %

## 2022-05-10 DIAGNOSIS — R09.02 HYPOXEMIA: ICD-10-CM

## 2022-05-10 DIAGNOSIS — Z93.1 GASTROSTOMY STATUS: Chronic | ICD-10-CM

## 2022-05-10 LAB
ALBUMIN SERPL ELPH-MCNC: 3.2 G/DL — LOW (ref 3.3–5)
ALP SERPL-CCNC: 81 U/L — SIGNIFICANT CHANGE UP (ref 40–120)
ALT FLD-CCNC: 23 U/L — SIGNIFICANT CHANGE UP (ref 4–33)
ANION GAP SERPL CALC-SCNC: 9 MMOL/L — SIGNIFICANT CHANGE UP (ref 7–14)
APPEARANCE UR: CLEAR — SIGNIFICANT CHANGE UP
AST SERPL-CCNC: 63 U/L — HIGH (ref 4–32)
BACTERIA # UR AUTO: NEGATIVE — SIGNIFICANT CHANGE UP
BASE EXCESS BLDV CALC-SCNC: 5 MMOL/L — HIGH (ref -2–3)
BASE EXCESS BLDV CALC-SCNC: 7.5 MMOL/L — HIGH (ref -2–3)
BASOPHILS # BLD AUTO: 0.03 K/UL — SIGNIFICANT CHANGE UP (ref 0–0.2)
BASOPHILS NFR BLD AUTO: 0.2 % — SIGNIFICANT CHANGE UP (ref 0–2)
BILIRUB SERPL-MCNC: 0.4 MG/DL — SIGNIFICANT CHANGE UP (ref 0.2–1.2)
BILIRUB UR-MCNC: NEGATIVE — SIGNIFICANT CHANGE UP
BLOOD GAS VENOUS COMPREHENSIVE RESULT: SIGNIFICANT CHANGE UP
BLOOD GAS VENOUS COMPREHENSIVE RESULT: SIGNIFICANT CHANGE UP
BUN SERPL-MCNC: 28 MG/DL — HIGH (ref 7–23)
CALCIUM SERPL-MCNC: 9.5 MG/DL — SIGNIFICANT CHANGE UP (ref 8.4–10.5)
CHLORIDE BLDV-SCNC: 93 MMOL/L — LOW (ref 96–108)
CHLORIDE BLDV-SCNC: 95 MMOL/L — LOW (ref 96–108)
CHLORIDE SERPL-SCNC: 93 MMOL/L — LOW (ref 98–107)
CO2 BLDV-SCNC: 35.5 MMOL/L — HIGH (ref 22–26)
CO2 BLDV-SCNC: 41.2 MMOL/L — HIGH (ref 22–26)
CO2 SERPL-SCNC: 31 MMOL/L — SIGNIFICANT CHANGE UP (ref 22–31)
COLOR SPEC: YELLOW — SIGNIFICANT CHANGE UP
CREAT SERPL-MCNC: 0.53 MG/DL — SIGNIFICANT CHANGE UP (ref 0.5–1.3)
DIFF PNL FLD: NEGATIVE — SIGNIFICANT CHANGE UP
EGFR: 108 ML/MIN/1.73M2 — SIGNIFICANT CHANGE UP
EOSINOPHIL # BLD AUTO: 0.08 K/UL — SIGNIFICANT CHANGE UP (ref 0–0.5)
EOSINOPHIL NFR BLD AUTO: 0.6 % — SIGNIFICANT CHANGE UP (ref 0–6)
EPI CELLS # UR: 1 /HPF — SIGNIFICANT CHANGE UP (ref 0–5)
FLUAV AG NPH QL: SIGNIFICANT CHANGE UP
FLUBV AG NPH QL: SIGNIFICANT CHANGE UP
GAS PNL BLDV: 123 MMOL/L — LOW (ref 136–145)
GAS PNL BLDV: 126 MMOL/L — LOW (ref 136–145)
GLUCOSE BLDV-MCNC: 106 MG/DL — HIGH (ref 70–99)
GLUCOSE BLDV-MCNC: 108 MG/DL — HIGH (ref 70–99)
GLUCOSE SERPL-MCNC: 105 MG/DL — HIGH (ref 70–99)
GLUCOSE UR QL: NEGATIVE — SIGNIFICANT CHANGE UP
HCO3 BLDV-SCNC: 34 MMOL/L — HIGH (ref 22–29)
HCO3 BLDV-SCNC: 39 MMOL/L — HIGH (ref 22–29)
HCT VFR BLD CALC: 48 % — HIGH (ref 34.5–45)
HCT VFR BLDA CALC: 45 % — SIGNIFICANT CHANGE UP (ref 34.5–46.5)
HCT VFR BLDA CALC: 49 % — HIGH (ref 34.5–46.5)
HGB BLD CALC-MCNC: 15 G/DL — SIGNIFICANT CHANGE UP (ref 11.5–15.5)
HGB BLD CALC-MCNC: 16.3 G/DL — HIGH (ref 11.5–15.5)
HGB BLD-MCNC: 15.6 G/DL — HIGH (ref 11.5–15.5)
HYALINE CASTS # UR AUTO: 1 /LPF — SIGNIFICANT CHANGE UP (ref 0–7)
IANC: 8.54 K/UL — HIGH (ref 1.8–7.4)
IMM GRANULOCYTES NFR BLD AUTO: 0.6 % — SIGNIFICANT CHANGE UP (ref 0–1.5)
KETONES UR-MCNC: ABNORMAL
LACTATE BLDV-MCNC: 1.8 MMOL/L — SIGNIFICANT CHANGE UP (ref 0.5–2)
LACTATE BLDV-MCNC: 2.4 MMOL/L — HIGH (ref 0.5–2)
LEUKOCYTE ESTERASE UR-ACNC: NEGATIVE — SIGNIFICANT CHANGE UP
LIDOCAIN IGE QN: 41 U/L — SIGNIFICANT CHANGE UP (ref 7–60)
LYMPHOCYTES # BLD AUTO: 18.4 % — SIGNIFICANT CHANGE UP (ref 13–44)
LYMPHOCYTES # BLD AUTO: 2.27 K/UL — SIGNIFICANT CHANGE UP (ref 1–3.3)
MCHC RBC-ENTMCNC: 32.5 GM/DL — SIGNIFICANT CHANGE UP (ref 32–36)
MCHC RBC-ENTMCNC: 34.3 PG — HIGH (ref 27–34)
MCV RBC AUTO: 105.5 FL — HIGH (ref 80–100)
MONOCYTES # BLD AUTO: 1.36 K/UL — HIGH (ref 0–0.9)
MONOCYTES NFR BLD AUTO: 11 % — SIGNIFICANT CHANGE UP (ref 2–14)
NEUTROPHILS # BLD AUTO: 8.54 K/UL — HIGH (ref 1.8–7.4)
NEUTROPHILS NFR BLD AUTO: 69.2 % — SIGNIFICANT CHANGE UP (ref 43–77)
NITRITE UR-MCNC: NEGATIVE — SIGNIFICANT CHANGE UP
NRBC # BLD: 0 /100 WBCS — SIGNIFICANT CHANGE UP
NRBC # FLD: 0 K/UL — SIGNIFICANT CHANGE UP
PCO2 BLDV: 65 MMHG — HIGH (ref 39–42)
PCO2 BLDV: 84 MMHG — HIGH (ref 39–42)
PH BLDV: 7.27 — LOW (ref 7.32–7.43)
PH BLDV: 7.32 — SIGNIFICANT CHANGE UP (ref 7.32–7.43)
PH UR: 6.5 — SIGNIFICANT CHANGE UP (ref 5–8)
PLATELET # BLD AUTO: 193 K/UL — SIGNIFICANT CHANGE UP (ref 150–400)
PO2 BLDV: 26 MMHG — SIGNIFICANT CHANGE UP
PO2 BLDV: 48 MMHG — SIGNIFICANT CHANGE UP
POTASSIUM BLDV-SCNC: 6.8 MMOL/L — CRITICAL HIGH (ref 3.5–5.1)
POTASSIUM BLDV-SCNC: SIGNIFICANT CHANGE UP MMOL/L (ref 3.5–5.1)
POTASSIUM SERPL-MCNC: 5.7 MMOL/L — HIGH (ref 3.5–5.3)
POTASSIUM SERPL-SCNC: 5.7 MMOL/L — HIGH (ref 3.5–5.3)
PROT SERPL-MCNC: 8 G/DL — SIGNIFICANT CHANGE UP (ref 6–8.3)
PROT UR-MCNC: ABNORMAL
RBC # BLD: 4.55 M/UL — SIGNIFICANT CHANGE UP (ref 3.8–5.2)
RBC # FLD: 13.5 % — SIGNIFICANT CHANGE UP (ref 10.3–14.5)
RBC CASTS # UR COMP ASSIST: 2 /HPF — SIGNIFICANT CHANGE UP (ref 0–4)
RSV RNA NPH QL NAA+NON-PROBE: SIGNIFICANT CHANGE UP
SAO2 % BLDV: 27.8 % — SIGNIFICANT CHANGE UP
SAO2 % BLDV: 75.2 % — SIGNIFICANT CHANGE UP
SARS-COV-2 RNA SPEC QL NAA+PROBE: SIGNIFICANT CHANGE UP
SODIUM SERPL-SCNC: 133 MMOL/L — LOW (ref 135–145)
SP GR SPEC: 1.03 — SIGNIFICANT CHANGE UP (ref 1–1.05)
UROBILINOGEN FLD QL: SIGNIFICANT CHANGE UP
WBC # BLD: 12.36 K/UL — HIGH (ref 3.8–10.5)
WBC # FLD AUTO: 12.36 K/UL — HIGH (ref 3.8–10.5)
WBC UR QL: 1 /HPF — SIGNIFICANT CHANGE UP (ref 0–5)

## 2022-05-10 PROCEDURE — 74177 CT ABD & PELVIS W/CONTRAST: CPT | Mod: 26,MA

## 2022-05-10 PROCEDURE — 71260 CT THORAX DX C+: CPT | Mod: 26,MA

## 2022-05-10 PROCEDURE — 71045 X-RAY EXAM CHEST 1 VIEW: CPT | Mod: 26

## 2022-05-10 PROCEDURE — 99285 EMERGENCY DEPT VISIT HI MDM: CPT | Mod: CS

## 2022-05-10 RX ORDER — ONDANSETRON 8 MG/1
4 TABLET, FILM COATED ORAL ONCE
Refills: 0 | Status: COMPLETED | OUTPATIENT
Start: 2022-05-10 | End: 2022-05-10

## 2022-05-10 RX ORDER — SODIUM CHLORIDE 9 MG/ML
1000 INJECTION, SOLUTION INTRAVENOUS ONCE
Refills: 0 | Status: COMPLETED | OUTPATIENT
Start: 2022-05-10 | End: 2022-05-10

## 2022-05-10 RX ORDER — FAMOTIDINE 10 MG/ML
20 INJECTION INTRAVENOUS ONCE
Refills: 0 | Status: COMPLETED | OUTPATIENT
Start: 2022-05-10 | End: 2022-05-10

## 2022-05-10 RX ORDER — VANCOMYCIN HCL 1 G
1000 VIAL (EA) INTRAVENOUS ONCE
Refills: 0 | Status: COMPLETED | OUTPATIENT
Start: 2022-05-10 | End: 2022-05-10

## 2022-05-10 RX ORDER — PIPERACILLIN AND TAZOBACTAM 4; .5 G/20ML; G/20ML
3.38 INJECTION, POWDER, LYOPHILIZED, FOR SOLUTION INTRAVENOUS ONCE
Refills: 0 | Status: COMPLETED | OUTPATIENT
Start: 2022-05-10 | End: 2022-05-10

## 2022-05-10 RX ADMIN — FAMOTIDINE 20 MILLIGRAM(S): 10 INJECTION INTRAVENOUS at 18:19

## 2022-05-10 RX ADMIN — SODIUM CHLORIDE 1000 MILLILITER(S): 9 INJECTION, SOLUTION INTRAVENOUS at 18:20

## 2022-05-10 RX ADMIN — ONDANSETRON 4 MILLIGRAM(S): 8 TABLET, FILM COATED ORAL at 18:20

## 2022-05-10 NOTE — ED PROVIDER NOTE - PHYSICAL EXAMINATION
General: appears chronically ill, nonverbal at baseline, occasionally moans   Skin: no rash, no pallor, skin breakdown behind R heal   Head: normocephalic, atraumatic  Eyes: clear conjunctiva, EOMI  ENMT: airway patent, no nasal discharge  Cardiovascular: normal rate, normal rhythm, S1/S2  Pulmonary: clear to auscultation bilaterally, no rales, rhonchi, or wheeze  Abdomen: soft, possible guarding, mild distention, PEG in place   Musculoskeletal: contractures of b/l upper and lower extremities, able to move arms  Psych: mild agitation with movement, calm at rest

## 2022-05-10 NOTE — ED ADULT NURSE NOTE - OBJECTIVE STATEMENT
Pt is a 57 year old female AOX0, nonverbal, bed bound baseline. Pt reporting tot he ED form group home for vomiting. Recently d/c from [facility] for aspiration pna. Pt arrives hypoxic, placed on # L nasal cannula, spoo2 maintaining 96%. Pt placed on CM, NSR. BP as documented. Pt has peg tub placed, site is clean dry and intact. PT has R heel stage 3 pressure ulcer ~ 1 inx 1in circular, gauze and ace wrap applied by nursing home. Pt abdomen is round and distended. PT straight cath for urine using sterile technique. Pt has swelling to lower extremities and bilateral hands. Un able to obtain IV access at this time. MD notified for US IV access.

## 2022-05-10 NOTE — ED PROVIDER NOTE - OBJECTIVE STATEMENT
Henry Lombardo, PGY-2- 57 year old female with a pmhx of cerebral palsy (non-verbal, moans, bedbound at baseline), profound intellectual disability, seizure d/o, constipation, multiple admissions for aspiration PNA, (Pseudomonas & MRSA in sputum) requiring intubation, dysphagia, admitted to [facility] recently for PEG malfunction, is brought to ED by EMS from [facility] for evaluation of vomiting x3. Per [facility], pt with O2 sat 78% prior to transfer. No wave form on O2 sat. Acting like self, no difficulty breathing noted, no coughing. PEG flushing without difficulty. Collateral obtained from [facility] RN.

## 2022-05-10 NOTE — ED ADULT NURSE NOTE - NSIMPLEMENTINTERV_GEN_ALL_ED
Implemented All Fall Risk Interventions:  [place] to call system. Call bell, personal items and telephone within reach. Instruct patient to call for assistance. Room bathroom lighting operational. Non-slip footwear when patient is off stretcher. Physically safe environment: no spills, clutter or unnecessary equipment. Stretcher in lowest position, wheels locked, appropriate side rails in place. Provide visual cue, wrist band, yellow gown, etc. Monitor gait and stability. Monitor for mental status changes and reorient to person, place, and time. Review medications for side effects contributing to fall risk. Reinforce activity limits and safety measures with patient and family.

## 2022-05-10 NOTE — ED ADULT NURSE REASSESSMENT NOTE - NS ED NURSE REASSESS COMMENT FT1
break rn: pt resting comfortably in bed, remains on CM, NSR. on O2 satting 97%. rpt vbg collected and sent. will continue to monitor.

## 2022-05-10 NOTE — ED PROVIDER NOTE - CLINICAL SUMMARY MEDICAL DECISION MAKING FREE TEXT BOX
Henry Lombardo, PGY-2- 59 year old female with a pmhx of cerebral palsy (non-verbal, moans, bedbound at baseline), profound intellectual disability, seizure d/o, constipation, multiple admissions for aspiration PNA, (Pseudomonas & MRSA in sputum) requiring intubation, PEG malfunctions, here for hypoxemia in setting of vomiting x3 at group home. Pt with limited ability to relay hx. Unclear if aspiration PNA as once pt has good wave form her O2 sat is 91-92%. Placed on 4L for comfort. Abd soft, with mild distention, plan to CT chest/abd/pelv with iv contrast given limited exam. Will r/o PNA, obstruction, electrolyte abnormality. Possible admission pending work up

## 2022-05-10 NOTE — ED PROVIDER NOTE - ATTENDING CONTRIBUTION TO CARE
GEN - awake, interacts with environment, nonverbal, makes incomprehensible sounds, does not follow instructions, sitting up in bed   HEAD - NC/AT   EYES- PERRL, EOMI  ENT: Airway patent, dry mm, Oral cavity and pharynx normal.   NECK: Neck supple  PULMONARY - unlabored breathing, no rales  CARDIAC -s1s2, RRR, no M,G,R  ABDOMEN - +BS, ND, soft, does groan when when abd is palpated, peg site clean  EXTREMITIES - FROM, +b/l le nonpitting edema of feet  SKIN - scattered bruises, no rash  NEUROLOGIC - alert, face symmetric, makes sounds but no comprehensible words.  [id number] presents to the ED with several episodes of vomiting noted at her group home. Patient unable to participate in history, nonverbal, bedbound. Patient has h/o aspiration pna-took her o2 at the home and found to be low-sent to ed. In ED o2 94 onra, unlabored breathing, limited lung exam due to poor effort but clear in all lung fields, abd soft but yells when palpated, very limited exam, not tachy, not febrile. WIll check labs, ekg, ct c/a/p-eval for infections (pna, uti), obstruction, elec disturbance, organ dysfunction, hydrate.

## 2022-05-10 NOTE — ED ADULT TRIAGE NOTE - CHIEF COMPLAINT QUOTE
Patient brought to ER by EMS from [facility] for aspiration pneumonia. Pt has been vomiting since today and low O2 sat,

## 2022-05-10 NOTE — ED ADULT TRIAGE NOTE - IDEAL BODY WEIGHT(KG)
Caller: Nadiya Rodríguez    Relationship: Self    Best call back number: [phone]    Requested Prescriptions: METOPROLOL TARTRATE 100 MG  TAB LEG  TAKES ONCE A DAY   Requested Prescriptions      No prescriptions requested or ordered in this encounter        Pharmacy where request should be sent:    [address] [place] [facility] - [phone]  - [phone] FX      Additional details provided by patient:   PATIENT STATES THAT SHE PICKED UP A PRESCRIPTION THAT WAS FOR METOPROLOL  MG TAB ING PATIENT IS REQUESTING THE METOPROLOL TARTRATE 100 MG TAB LEG    Does the patient have less than a 3 day supply:  [] Yes  [x] No    Abdoul Talbot Rep   12/09/21 11:34 EST       PLEASE ADVISE PATIENT. THANKS.    No-Patient/Caregiver offered and refused free interpretation services. 52

## 2022-05-10 NOTE — ED PROVIDER NOTE - PROGRESS NOTE DETAILS
Henry Lombardo, PGY-2- Spoke with patient's mother - [phone] Mrs Ponce and updated her on plan. Advised to call back in 5-6 hours for an update.  Collateral obtained from group home, see HPI NEFTALY Ron- Pt received at sign out pending CT. CT negative for acute findings. Pt Stable at this time but given hx of aspiration pna in setting of n/v plan for admission,. Spoke to hospitalist who accepted patient. Wants dose of vanc/zosyn. Will admit at this time.

## 2022-05-11 ENCOUNTER — TRANSCRIPTION ENCOUNTER (OUTPATIENT)
Age: 57
End: 2022-05-11

## 2022-05-11 DIAGNOSIS — Z29.9 ENCOUNTER FOR PROPHYLACTIC MEASURES, UNSPECIFIED: ICD-10-CM

## 2022-05-11 DIAGNOSIS — R68.0 HYPOTHERMIA, NOT ASSOCIATED WITH LOW ENVIRONMENTAL TEMPERATURE: ICD-10-CM

## 2022-05-11 DIAGNOSIS — G40.909 EPILEPSY, UNSPECIFIED, NOT INTRACTABLE, WITHOUT STATUS EPILEPTICUS: ICD-10-CM

## 2022-05-11 DIAGNOSIS — R11.2 NAUSEA WITH VOMITING, UNSPECIFIED: ICD-10-CM

## 2022-05-11 DIAGNOSIS — J45.909 UNSPECIFIED ASTHMA, UNCOMPLICATED: ICD-10-CM

## 2022-05-11 DIAGNOSIS — K59.00 CONSTIPATION, UNSPECIFIED: ICD-10-CM

## 2022-05-11 DIAGNOSIS — Z93.1 GASTROSTOMY STATUS: ICD-10-CM

## 2022-05-11 DIAGNOSIS — D72.829 ELEVATED WHITE BLOOD CELL COUNT, UNSPECIFIED: ICD-10-CM

## 2022-05-11 LAB
A1C WITH ESTIMATED AVERAGE GLUCOSE RESULT: 4.9 % — SIGNIFICANT CHANGE UP (ref 4–5.6)
ANION GAP SERPL CALC-SCNC: 11 MMOL/L — SIGNIFICANT CHANGE UP (ref 7–14)
ANISOCYTOSIS BLD QL: SLIGHT — SIGNIFICANT CHANGE UP
BASE EXCESS BLDV CALC-SCNC: 5.7 MMOL/L — HIGH (ref -2–3)
BASOPHILS # BLD AUTO: 0 K/UL — SIGNIFICANT CHANGE UP (ref 0–0.2)
BASOPHILS NFR BLD AUTO: 0 % — SIGNIFICANT CHANGE UP (ref 0–2)
BLOOD GAS VENOUS COMPREHENSIVE RESULT: SIGNIFICANT CHANGE UP
BUN SERPL-MCNC: 24 MG/DL — HIGH (ref 7–23)
CALCIUM SERPL-MCNC: 9 MG/DL — SIGNIFICANT CHANGE UP (ref 8.4–10.5)
CHLORIDE BLDV-SCNC: 96 MMOL/L — SIGNIFICANT CHANGE UP (ref 96–108)
CHLORIDE SERPL-SCNC: 95 MMOL/L — LOW (ref 98–107)
CHOLEST SERPL-MCNC: 130 MG/DL — SIGNIFICANT CHANGE UP
CO2 BLDV-SCNC: 37.1 MMOL/L — HIGH (ref 22–26)
CO2 SERPL-SCNC: 27 MMOL/L — SIGNIFICANT CHANGE UP (ref 22–31)
CREAT SERPL-MCNC: 0.51 MG/DL — SIGNIFICANT CHANGE UP (ref 0.5–1.3)
CULTURE RESULTS: SIGNIFICANT CHANGE UP
EGFR: 109 ML/MIN/1.73M2 — SIGNIFICANT CHANGE UP
EOSINOPHIL # BLD AUTO: 0.1 K/UL — SIGNIFICANT CHANGE UP (ref 0–0.5)
EOSINOPHIL NFR BLD AUTO: 0.9 % — SIGNIFICANT CHANGE UP (ref 0–6)
ESTIMATED AVERAGE GLUCOSE: 94 — SIGNIFICANT CHANGE UP
GAS PNL BLDV: 129 MMOL/L — LOW (ref 136–145)
GIANT PLATELETS BLD QL SMEAR: PRESENT — SIGNIFICANT CHANGE UP
GLUCOSE BLDC GLUCOMTR-MCNC: 80 MG/DL — SIGNIFICANT CHANGE UP (ref 70–99)
GLUCOSE BLDC GLUCOMTR-MCNC: 96 MG/DL — SIGNIFICANT CHANGE UP (ref 70–99)
GLUCOSE BLDV-MCNC: 106 MG/DL — HIGH (ref 70–99)
GLUCOSE SERPL-MCNC: 100 MG/DL — HIGH (ref 70–99)
HCO3 BLDV-SCNC: 35 MMOL/L — HIGH (ref 22–29)
HCT VFR BLD CALC: 45.2 % — HIGH (ref 34.5–45)
HCT VFR BLDA CALC: 45 % — SIGNIFICANT CHANGE UP (ref 34.5–46.5)
HDLC SERPL-MCNC: 43 MG/DL — LOW
HGB BLD CALC-MCNC: 15 G/DL — SIGNIFICANT CHANGE UP (ref 11.5–15.5)
HGB BLD-MCNC: 14.7 G/DL — SIGNIFICANT CHANGE UP (ref 11.5–15.5)
IANC: 7.73 K/UL — HIGH (ref 1.8–7.4)
LACTATE BLDV-MCNC: 1.2 MMOL/L — SIGNIFICANT CHANGE UP (ref 0.5–2)
LIPID PNL WITH DIRECT LDL SERPL: 66 MG/DL — SIGNIFICANT CHANGE UP
LYMPHOCYTES # BLD AUTO: 0.83 K/UL — LOW (ref 1–3.3)
LYMPHOCYTES # BLD AUTO: 7.1 % — LOW (ref 13–44)
MACROCYTES BLD QL: SLIGHT — SIGNIFICANT CHANGE UP
MAGNESIUM SERPL-MCNC: 1.8 MG/DL — SIGNIFICANT CHANGE UP (ref 1.6–2.6)
MANUAL SMEAR VERIFICATION: SIGNIFICANT CHANGE UP
MCHC RBC-ENTMCNC: 32.5 GM/DL — SIGNIFICANT CHANGE UP (ref 32–36)
MCHC RBC-ENTMCNC: 34.7 PG — HIGH (ref 27–34)
MCV RBC AUTO: 106.6 FL — HIGH (ref 80–100)
MONOCYTES # BLD AUTO: 1.87 K/UL — HIGH (ref 0–0.9)
MONOCYTES NFR BLD AUTO: 16.1 % — HIGH (ref 2–14)
NEUTROPHILS # BLD AUTO: 8.63 K/UL — HIGH (ref 1.8–7.4)
NEUTROPHILS NFR BLD AUTO: 74.1 % — SIGNIFICANT CHANGE UP (ref 43–77)
NON HDL CHOLESTEROL: 87 MG/DL — SIGNIFICANT CHANGE UP
PCO2 BLDV: 71 MMHG — HIGH (ref 39–42)
PH BLDV: 7.3 — LOW (ref 7.32–7.43)
PHOSPHATE SERPL-MCNC: 4.6 MG/DL — HIGH (ref 2.5–4.5)
PLAT MORPH BLD: NORMAL — SIGNIFICANT CHANGE UP
PLATELET # BLD AUTO: 155 K/UL — SIGNIFICANT CHANGE UP (ref 150–400)
PLATELET COUNT - ESTIMATE: NORMAL — SIGNIFICANT CHANGE UP
PO2 BLDV: 44 MMHG — SIGNIFICANT CHANGE UP
POIKILOCYTOSIS BLD QL AUTO: SLIGHT — SIGNIFICANT CHANGE UP
POLYCHROMASIA BLD QL SMEAR: SLIGHT — SIGNIFICANT CHANGE UP
POTASSIUM BLDV-SCNC: SIGNIFICANT CHANGE UP MMOL/L (ref 3.5–5.1)
POTASSIUM SERPL-MCNC: 5.1 MMOL/L — SIGNIFICANT CHANGE UP (ref 3.5–5.3)
POTASSIUM SERPL-SCNC: 5.1 MMOL/L — SIGNIFICANT CHANGE UP (ref 3.5–5.3)
RBC # BLD: 4.24 M/UL — SIGNIFICANT CHANGE UP (ref 3.8–5.2)
RBC # FLD: 13.3 % — SIGNIFICANT CHANGE UP (ref 10.3–14.5)
RBC BLD AUTO: ABNORMAL
SAO2 % BLDV: 69.1 % — SIGNIFICANT CHANGE UP
SODIUM SERPL-SCNC: 133 MMOL/L — LOW (ref 135–145)
SPECIMEN SOURCE: SIGNIFICANT CHANGE UP
T3 SERPL-MCNC: 116 NG/DL — SIGNIFICANT CHANGE UP (ref 80–200)
T4 FREE SERPL-MCNC: 1.1 NG/DL — SIGNIFICANT CHANGE UP (ref 0.9–1.8)
TRIGL SERPL-MCNC: 104 MG/DL — SIGNIFICANT CHANGE UP
TSH SERPL-MCNC: 5.98 UIU/ML — HIGH (ref 0.27–4.2)
VARIANT LYMPHS # BLD: 1.8 % — SIGNIFICANT CHANGE UP (ref 0–6)
WBC # BLD: 11.64 K/UL — HIGH (ref 3.8–10.5)
WBC # FLD AUTO: 11.64 K/UL — HIGH (ref 3.8–10.5)

## 2022-05-11 PROCEDURE — 99223 1ST HOSP IP/OBS HIGH 75: CPT

## 2022-05-11 RX ORDER — ALBUTEROL 90 UG/1
2.5 AEROSOL, METERED ORAL EVERY 6 HOURS
Refills: 0 | Status: DISCONTINUED | OUTPATIENT
Start: 2022-05-11 | End: 2022-05-16

## 2022-05-11 RX ORDER — LACOSAMIDE 50 MG/1
200 TABLET ORAL
Refills: 0 | Status: DISCONTINUED | OUTPATIENT
Start: 2022-05-11 | End: 2022-05-11

## 2022-05-11 RX ORDER — SENNA PLUS 8.6 MG/1
2 TABLET ORAL AT BEDTIME
Refills: 0 | Status: DISCONTINUED | OUTPATIENT
Start: 2022-05-11 | End: 2022-05-16

## 2022-05-11 RX ORDER — LACOSAMIDE 50 MG/1
200 TABLET ORAL
Refills: 0 | Status: DISCONTINUED | OUTPATIENT
Start: 2022-05-11 | End: 2022-05-16

## 2022-05-11 RX ORDER — FERROUS SULFATE 325(65) MG
300 TABLET ORAL DAILY
Refills: 0 | Status: DISCONTINUED | OUTPATIENT
Start: 2022-05-11 | End: 2022-05-16

## 2022-05-11 RX ORDER — ENOXAPARIN SODIUM 100 MG/ML
40 INJECTION SUBCUTANEOUS EVERY 24 HOURS
Refills: 0 | Status: DISCONTINUED | OUTPATIENT
Start: 2022-05-11 | End: 2022-05-16

## 2022-05-11 RX ORDER — LEVETIRACETAM 250 MG/1
600 TABLET, FILM COATED ORAL
Refills: 0 | Status: DISCONTINUED | OUTPATIENT
Start: 2022-05-11 | End: 2022-05-16

## 2022-05-11 RX ORDER — CALCIUM CARBONATE 500(1250)
1 TABLET ORAL DAILY
Refills: 0 | Status: DISCONTINUED | OUTPATIENT
Start: 2022-05-11 | End: 2022-05-11

## 2022-05-11 RX ORDER — BUDESONIDE, MICRONIZED 100 %
0.5 POWDER (GRAM) MISCELLANEOUS EVERY 12 HOURS
Refills: 0 | Status: DISCONTINUED | OUTPATIENT
Start: 2022-05-11 | End: 2022-05-16

## 2022-05-11 RX ORDER — CALCIUM CARBONATE 500(1250)
1 TABLET ORAL DAILY
Refills: 0 | Status: DISCONTINUED | OUTPATIENT
Start: 2022-05-11 | End: 2022-05-16

## 2022-05-11 RX ORDER — VALPROIC ACID (AS SODIUM SALT) 250 MG/5ML
750 SOLUTION, ORAL ORAL THREE TIMES A DAY
Refills: 0 | Status: DISCONTINUED | OUTPATIENT
Start: 2022-05-11 | End: 2022-05-16

## 2022-05-11 RX ORDER — LACTULOSE 10 G/15ML
15 SOLUTION ORAL DAILY
Refills: 0 | Status: DISCONTINUED | OUTPATIENT
Start: 2022-05-11 | End: 2022-05-16

## 2022-05-11 RX ADMIN — Medication 750 MILLIGRAM(S): at 23:19

## 2022-05-11 RX ADMIN — Medication 750 MILLIGRAM(S): at 06:54

## 2022-05-11 RX ADMIN — Medication 250 MILLIGRAM(S): at 01:31

## 2022-05-11 RX ADMIN — LEVETIRACETAM 600 MILLIGRAM(S): 250 TABLET, FILM COATED ORAL at 17:14

## 2022-05-11 RX ADMIN — Medication 1 TABLET(S): at 11:02

## 2022-05-11 RX ADMIN — LACOSAMIDE 200 MILLIGRAM(S): 50 TABLET ORAL at 06:55

## 2022-05-11 RX ADMIN — PIPERACILLIN AND TAZOBACTAM 200 GRAM(S): 4; .5 INJECTION, POWDER, LYOPHILIZED, FOR SOLUTION INTRAVENOUS at 00:01

## 2022-05-11 RX ADMIN — Medication 0.5 MILLIGRAM(S): at 23:21

## 2022-05-11 RX ADMIN — LACOSAMIDE 200 MILLIGRAM(S): 50 TABLET ORAL at 14:53

## 2022-05-11 RX ADMIN — Medication 750 MILLIGRAM(S): at 14:54

## 2022-05-11 RX ADMIN — Medication 0.5 MILLIGRAM(S): at 09:06

## 2022-05-11 RX ADMIN — LACOSAMIDE 200 MILLIGRAM(S): 50 TABLET ORAL at 23:18

## 2022-05-11 RX ADMIN — ENOXAPARIN SODIUM 40 MILLIGRAM(S): 100 INJECTION SUBCUTANEOUS at 06:56

## 2022-05-11 RX ADMIN — SENNA PLUS 2 TABLET(S): 8.6 TABLET ORAL at 23:19

## 2022-05-11 RX ADMIN — LEVETIRACETAM 600 MILLIGRAM(S): 250 TABLET, FILM COATED ORAL at 06:55

## 2022-05-11 RX ADMIN — Medication 300 MILLIGRAM(S): at 11:01

## 2022-05-11 RX ADMIN — Medication 1 TABLET(S): at 11:01

## 2022-05-11 NOTE — H&P ADULT - PROBLEM SELECTOR PLAN 1
No episodes of vomiting currently  CT abdomen: No bowel obstruction or evidence of acute bowel inflammation.   Percutaneous gastrostomy tube within the stomach.  Monitor for further episodes No episodes of vomiting currently  CT abdomen: No bowel obstruction or evidence of acute bowel inflammation.   Percutaneous gastrostomy tube within the stomach.  Monitor for further episodes  will restart peg feeds No episodes of vomiting currently  CT abdomen: No bowel obstruction or evidence of acute bowel inflammation.   Percutaneous gastrostomy tube within the stomach.  will restart peg feeds for now  Monitor for further episodes

## 2022-05-11 NOTE — H&P ADULT - PROBLEM SELECTOR PLAN 2
WBC: 12.36  Patient presented with hypoxia from the group home. Patient had one episode of pulse of 94 initially, then patient has been 100% on RA.   CXR and CT chest: no signs of PNA  s/p vanco/zosyn  UA neg, Follow UC WBC: 12.36  Patient presented with hypoxia from the group home. Patient had one episode of pulse of 94 initially, then patient has been 100% on RA.   CXR and CT chest: no signs of PNA  s/p vanco/zosyn  UA neg, Follow UC  Monitor for aspiration closely  aspiration precautions

## 2022-05-11 NOTE — H&P ADULT - NS ATTEND AMEND GEN_ALL_CORE FT
Pt with CP and prior aspiration pna p/w episodes of vomiting, and found to be hypoxic at group home.  Pt has had no further emesis or hypoxia since arrival to ED.  On exam: NAD, Heart RRR, Lungs CTA B.  Abd s/nt/nd normal BS  Labs reviewed  Will monitor for recurrence of vomiting after restarting tube feeds  observe off O2

## 2022-05-11 NOTE — DISCHARGE NOTE PROVIDER - NSDCMRMEDTOKEN_GEN_ALL_CORE_FT
acetaminophen 160 mg/5 mL oral suspension: 650 milliliter(s) by gastrostomy tube every 6 hours, As Needed - 3)  albuterol 2.5 mg/3 mL (0.083%) inhalation solution: 3 milliliter(s) inhaled every 6 hours, As Needed for shortness of breath/wheezing  budesonide: 0.5 milligram(s) inhaled 2 times a day  calcium carbonate 500 mg (200 mg elemental calcium) oral tablet, chewable: 1 tab(s) by gastrostomy tube once a day  collagenase 250 units/g topical ointment: 1 application topically once a day  ferrous sulfate 220 mg/5 mL (44 mg/5 mL elemental iron) oral elixir: 7.5 milliliter(s) orally once a day  Fosamax 70 mg oral tablet: 1 tab(s) orally once a week  Fosamax 70 mg oral tablet: 1 tab(s) orally once a week  lacosamide 10 mg/mL oral solution: 200 milligram(s) by gastrostomy tube 3 times a day at 0700 1500  2200    levETIRAcetam 100 mg/mL oral solution: 6 milliliter(s) by gastrostomy tube 2 times a day  Multiple Vitamins with Minerals oral tablet: 1 tab(s) by gastrostomy tube once a day  Nayzilam 5 mg/inh nasal spray: 1 puff(s) nasal once PRN for seizure lastiing &gt;4 minutes  senna oral tablet: 2 tab(s) by gastrostomy tube once a day (at bedtime)  sodium chloride 3% inhalation solution: 4 milliliter(s) inhaled every 12 hours  valproic acid 250 mg/5 mL oral liquid: 750 milligram(s) by gastrostomy tube 3 times a day  vitamin A and D topical ointment: 1 application topically once a day to affected area    acetaminophen 160 mg/5 mL oral suspension: 650 milliliter(s) by gastrostomy tube every 6 hours, As Needed - 3)  albuterol 2.5 mg/3 mL (0.083%) inhalation solution: 3 milliliter(s) inhaled every 6 hours, As Needed for shortness of breath/wheezing  budesonide: 0.5 milligram(s) inhaled 2 times a day   calcium carbonate 500 mg (200 mg elemental calcium) oral tablet, chewable: 1 tab(s) by gastrostomy tube once a day  collagenase 250 units/g topical ointment: 1 application topically once a day  ferrous sulfate 220 mg/5 mL (44 mg/5 mL elemental iron) oral elixir: 7.5 milliliter(s) orally once a day  Fosamax 70 mg oral tablet: 1 tab(s) orally once a week  Fosamax 70 mg oral tablet: 1 tab(s) orally once a week  lacosamide 10 mg/mL oral solution: 200 milligram(s) by gastrostomy tube 3 times a day at 0700 1500  2200 MDD:600 mg  lactulose 10 g/15 mL oral syrup: 22.5 milliliter(s) orally once a day as needed for constipation HOLD FOR LOOSE STOOLS  levETIRAcetam 100 mg/mL oral solution: 6 milliliter(s) by gastrostomy tube 2 times a day  Multiple Vitamins oral tablet: 1 tab(s) orally once a day via PEG   Nayzilam 5 mg/inh nasal spray: 1 puff(s) nasal once PRN for seizure lastiing &gt;4 minutes  senna oral tablet: 2 tab(s) by gastrostomy tube once a day (at bedtime)  valproic acid 250 mg/5 mL oral liquid: 750 milligram(s) by gastrostomy tube 3 times a day  vitamin A and D topical ointment: 1 application topically once a day to affected area    albuterol 2.5 mg/3 mL (0.083%) inhalation solution: 3 milliliter(s) inhaled every 6 hours, As Needed for shortness of breath/wheezing  budesonide: 0.5 milligram(s) inhaled 2 times a day   calcium carbonate 500 mg (200 mg elemental calcium) oral tablet, chewable: 1 tab(s) by gastrostomy tube once a day  collagenase 250 units/g topical ointment: 1 application topically once a day  ferrous sulfate 220 mg/5 mL (44 mg/5 mL elemental iron) oral elixir: 7.5 milliliter(s) orally once a day  Fosamax 70 mg oral tablet: 1 tab(s) orally once a week  lacosamide 10 mg/mL oral solution: 200 milligram(s) by gastrostomy tube 3 times a day at 0700 1500  2200 MDD:600 mg  lactulose 10 g/15 mL oral syrup: 22.5 milliliter(s) orally once a day as needed for constipation HOLD FOR LOOSE STOOLS  levETIRAcetam 100 mg/mL oral solution: 6 milliliter(s) by gastrostomy tube 2 times a day  Multiple Vitamins oral tablet: 1 tab(s) orally once a day via PEG   Nayzilam 5 mg/inh nasal spray: 1 puff(s) nasal once PRN for seizure lastiing &gt;4 minutes  senna oral tablet: 2 tab(s) by gastrostomy tube once a day (at bedtime)  valproic acid 250 mg/5 mL oral liquid: 750 milligram(s) by gastrostomy tube 3 times a day  vitamin A and D topical ointment: 1 application topically once a day to affected area

## 2022-05-11 NOTE — ED ADULT NURSE REASSESSMENT NOTE - NS ED NURSE REASSESS COMMENT FT1
Patient temperature normalized. MD discontinued bear hugger blanket. bear hugger blanket turned off per MD orders. Pt O2 noted to go to 91% when sleeping at times. Safety being maintained. Will continue to monitor. Patient temperature normalized. MD discontinued bear hugger blanket. Bear hugger blanket turned off per MD orders. Pt O2 noted to go to 91% when sleeping at times. Safety being maintained. Will continue to monitor.

## 2022-05-11 NOTE — PROGRESS NOTE ADULT - PROBLEM SELECTOR PLAN 3
Found to be hypothermic  - F/U infectious work up, blood culture (will be after 1 dose of Vanc/Zosyn   - Bear hugger  - F/u TSH and cortisol Found to be hypothermic  unclear etiology: Infectious vs. hypothyroidism vs.  adrenal insufficiency, central,   - F/U infectious work up, blood culture (will be after 1 dose of Vanc/Zosyn   - Bear hugger  - F/u TSH and cortisol

## 2022-05-11 NOTE — DISCHARGE NOTE PROVIDER - CARE PROVIDER_API CALL
Physician, Primary Care  from [facility]  Phone: (   )    -  Fax: (   )    -  Established Patient  Follow Up Time:

## 2022-05-11 NOTE — DISCHARGE NOTE PROVIDER - HOSPITAL COURSE
57F w/ pmhx of cerebral palsy (non-verbal, moans, bedbound at baseline), profound intellectual disability, seizure d/o, constipation, multiple admissions for aspiration PNA, (Pseudomonas & MRSA in sputum) requiring intubation, dysphagia s/p PEG w/ 2 recent admissions for issues related with aspiration presents with episodes of vomiting and hypoxia at [facility].    Nausea & vomiting.   No episodes of vomiting currently  CT abdomen: No bowel obstruction or evidence of acute bowel inflammation.   Percutaneous gastrostomy tube within the stomach.  Monitor for further episodes.  5/1 restart tube feeding    Leukocytosis.   WBC: 12.36  Patient presented with hypoxia from the group home. Patient had one episode of pulse of 94 initially, then patient has been 100% on RA.   CXR and CT chest: no signs of PNA  s/p vanco/zosyn  UA neg, Follow UC.    Seizure disorder.   cont vimpat 200mg TID  cont valproic aci 750mg TID  con keppra 600mg BID.    Asthma.   cont albuterol, budesonide.    Constipation.   cont senna   Can consider Milk of magnesia or fleet enema if there is no BM.    Feeding by G-tube.   Patient receives Jevity via G-tube  Per charts: patient receives 50mL/hr for 18hours [900mL total] and flush G-tube with 60mL before and after the feed. Flush with 25mL/hr for 18 hours for a total of 450mL  Restart feeds  Nutrition eval.    tsh 5.98- free t4 normal    57F w/ pmhx of cerebral palsy (non-verbal, moans, bedbound at baseline), profound intellectual disability, seizure d/o, constipation, multiple admissions for aspiration PNA, (Pseudomonas & MRSA in sputum) requiring intubation, dysphagia s/p PEG w/ 2 recent admissions for issues related with aspiration presents with episodes of vomiting and hypoxia at [facility].    Nausea & vomiting.   No episodes of vomiting currently  CT abdomen: No bowel obstruction or evidence of acute bowel inflammation.   Percutaneous gastrostomy tube within the stomach.  Monitor for further episodes.  5/11 restart tube feeding    Leukocytosis.   WBC: 12.36  Patient presented with hypoxia from the group home.   Patient had one episode of pulse of 94 initially, then patient has been 100% on RA.   CXR and CT chest: no signs of PNA  s/p vanco/zosyn, monitor off antibiotics   UA neg, Follow UC.    Seizure disorder.   cont vimpat 200mg TID  cont valproic aci 750mg TID  con keppra 600mg BID.    Asthma.   cont albuterol, budesonide.    Constipation.   cont senna   Can consider Milk of magnesia or fleet enema if there is no BM.    Feeding by G-tube.   Patient receives Jevity via G-tube  Per charts: patient receives 50mL/hr for 18hours [900mL total] and flush G-tube with 60mL before and after the feed.   Flush with 25mL/hr for 18 hours for a total of 450mL  Restart feeds  Nutrition eval.    tsh 5.98- free t4 normal     Dispo: [facility]     On 5/12/2022, case was discussed with Dr. Jill Cummings, patient is medically cleared and optimized for discharge today.   All medications were reviewed with attending, and sent to mutually agreed upon pharmacy.     57F w/ pmhx of cerebral palsy (non-verbal, moans, bedbound at baseline), profound intellectual disability, seizure d/o, constipation, multiple admissions for aspiration PNA, (Pseudomonas & MRSA in sputum) requiring intubation, dysphagia s/p PEG w/ 2 recent admissions for issues related with aspiration presents with episodes of vomiting and hypoxia at [facility].    Nausea & vomiting.   No episodes of vomiting currently  CT abdomen: No bowel obstruction or evidence of acute bowel inflammation.   Percutaneous gastrostomy tube within the stomach.  Monitor for further episodes.  5/11 restart tube feeding    Leukocytosis/Bacteremia   WBC: 12.36  Patient presented with hypoxia from the group home.   Patient had one episode of pulse of 94 initially, then patient has been 100% on RA.   CXR and CT chest: no signs of PNA  s/p vanco/zosyn, monitor off antibiotics   UA neg, Follow UC.  BCx gram+ cocci clusters, likely contaminant w/staph epi, rpt sent, on IV Ctx until cleared   Rpt Bcx --------------------------    Seizure disorder.   cont vimpat 200mg TID  cont valproic aci 750mg TID  con keppra 600mg BID.    Asthma.   cont albuterol, budesonide.    Constipation.   cont senna   Can consider Milk of magnesia or fleet enema if there is no BM.    Feeding by G-tube.   Patient receives Jevity via G-tube  Per charts: patient receives 50mL/hr for 18hours [900mL total] and flush G-tube with 60mL before and after the feed.   Flush with 25mL/hr for 18 hours for a total of 450mL  Restart feeds  Nutrition eval.    tsh 5.98- free t4 normal     Dispo: Group Home     On 5/12/2022, case was discussed with Dr. Jill Cummings, patient is medically cleared and optimized for discharge today.   All medications were reviewed with attending, and sent to mutually agreed upon pharmacy.     57F h/o cerebral palsy (non-verbal, moans, bedbound at baseline), profound intellectual disability, seizure d/o, constipation, multiple admissions for aspiration PNA, (Pseudomonas & MRSA in sputum) requiring intubation, dysphagia s/p PEG w/ 2 recent admissions for issues related with aspiration presents with episodes of vomiting and hypoxia    Positive blood culture  - Bcx (+) staph epidermis likely contaminant as patient is afebrile, nontoxic appearing, VSS  - S/p Vancomycin narrowed to ceftriaxone started 5/13  - Repeat blood culture sent on 5/13 NGTD  - Leukocytosis improved afebrile, CXR and CT chest no signs of PNA, UA (-) was possibly from episodes of N/V now resolved    Nausea & vomiting (Resolved)  - CT A/P w/ no bowel obstruction or evidence of acute bowel inflammation. PEG within the stomach.  - C/w peg feeds, pt tolerating without issue     Hypothermia (Resolved)  - Found to be hypothermic unclear etiology possible 2/2 infection  - TSH marginally elevated, T3/T4 WNL     Seizure disorder continued Vimpat 200mg TID, VPA 750mg TID, and Keppra 600mg BID w/ seizure precautions    Asthma continued Albuterol/Budesonide    Feeding by G-tube.   - Patient receives Jevity via G-tube 50mL/hr for 18hours [900mL total] and flush G-tube with 60mL before and after the feed. Flush with 25mL/hr for 18 hours for a total of 450mL  c/w tube feed    R heel wound  - Podiatry saw patient, afebrile, WBC 11.64  - R heel wound to subQ, no periwound erythema, no malodor, no drainage, no acute signs of infection; No indication for wound culture as there are no acute signs of infection  - Plan for local wound care, santyl collagenase w/ allevyn pads QD and booties    Dispo -

## 2022-05-11 NOTE — H&P ADULT - PROBLEM SELECTOR PLAN 3
cont vimpat 200mg TID  cont valproic aci 750mg TID  con keppra 600mg BID cont vimpat 200mg TID  cont valproic acid 750mg TID  con keppra 600mg BID

## 2022-05-11 NOTE — H&P ADULT - HISTORY OF PRESENT ILLNESS
57F w/ pmhx of cerebral palsy (non-verbal, moans, bedbound at baseline), profound intellectual disability, seizure d/o, constipation, multiple admissions for aspiration PNA, (Pseudomonas & MRSA in sputum) requiring intubation, dysphagia s/p PEG w/ 2 recent admissions for issues related with aspiration presents with episodes of vomiting and hypoxia at [facility]. Patient is non verbal and moans at bedside. Patient is awake and alert. ROS limited secondary to patient's condition. Per charts; Patient's Pulse ox was 78 and three episodes of vomiting.

## 2022-05-11 NOTE — DISCHARGE NOTE PROVIDER - NSDCCPCAREPLAN_GEN_ALL_CORE_FT
PRINCIPAL DISCHARGE DIAGNOSIS  Diagnosis: Nausea and vomiting  Assessment and Plan of Treatment: resolved  restarted tube feeding, continue with tube feedings   follow up with provider at [facility]      SECONDARY DISCHARGE DIAGNOSES  Diagnosis: Nausea and vomiting  Assessment and Plan of Treatment:      PRINCIPAL DISCHARGE DIAGNOSIS  Diagnosis: Nausea and vomiting  Assessment and Plan of Treatment: resolved  restarted tube feeding, continue with tube feedings   follow up with provider at [facility]      SECONDARY DISCHARGE DIAGNOSES  Diagnosis: Nausea and vomiting  Assessment and Plan of Treatment: Aspiration Precautions:   If you are caring for someone who has a feeding tube who cannot eat or drink safely through his or her mouth:  Keep the person in an upright position as much as possible.  Do not lay the person flat if he or she is getting continuous feedings. If you need to lay the person flat for any reason, turn the feeding pump off.   Check feeding tube residuals as told by your health care provider. Ask your health care provider what residual amount is too high.    Diagnosis: Constipation  Assessment and Plan of Treatment: Continue bowel regimen and monitor for bowel movements     PRINCIPAL DISCHARGE DIAGNOSIS  Diagnosis: Nausea and vomiting  Assessment and Plan of Treatment: resolved  restarted tube feeding, continue with tube feedings   follow up with provider at [facility]      SECONDARY DISCHARGE DIAGNOSES  Diagnosis: Nausea and vomiting  Assessment and Plan of Treatment: Aspiration Precautions:   If you are caring for someone who has a feeding tube who cannot eat or drink safely through his or her mouth:  Keep the person in an upright position as much as possible.  Do not lay the person flat if he or she is getting continuous feedings. If you need to lay the person flat for any reason, turn the feeding pump off.   Check feeding tube residuals as told by your health care provider. Ask your health care provider what residual amount is too high.    Diagnosis: Asthma  Assessment and Plan of Treatment: Continue taking inhalers as prescribed    Diagnosis: Seizure disorder  Assessment and Plan of Treatment: Continue taking seizure medications as prescribed    Diagnosis: Constipation  Assessment and Plan of Treatment: Continue bowel regimen and monitor for bowel movements  Senna, lactulose     PRINCIPAL DISCHARGE DIAGNOSIS  Diagnosis: Nausea and vomiting  Assessment and Plan of Treatment: Resolved and restarted tube feedings without difficulty.  Aspiration Precautions:   If you are caring for someone who has a feeding tube who cannot eat or drink safely through his or her mouth:  Keep the person in an upright position as much as possible.  Do not lay the person flat if he or she is getting continuous feedings. If you need to lay the person flat for any reason, turn the feeding pump off.   Check feeding tube residuals as told by your health care provider. Ask your health care provider what residual amount is too high.      SECONDARY DISCHARGE DIAGNOSES  Diagnosis: Constipation  Assessment and Plan of Treatment: Continue bowel regimen and monitor for bowel movements  Senna, lactulose    Diagnosis: Seizure disorder  Assessment and Plan of Treatment: Continue taking seizure medications as prescribed    Diagnosis: Asthma  Assessment and Plan of Treatment: Continue taking inhalers as prescribed

## 2022-05-11 NOTE — DISCHARGE NOTE PROVIDER - PROVIDER TOKENS
FREE:[LAST:[Physician],FIRST:[Primary Care],PHONE:[(   )    -],FAX:[(   )    -],ADDRESS:[from [facility]],ESTABLISHEDPATIENT:[T]]
Strong peripheral pulses/Capillary refill less/equal to 2 seconds/No visible significant external bleeding

## 2022-05-11 NOTE — ED ADULT NURSE REASSESSMENT NOTE - NS ED NURSE REASSESS COMMENT FT1
pt. awake and resting at this time. NAD noted. respirations even and unlabored. AM Labs drawn as ordered. FS as noted. NSR on cardiac monitor.

## 2022-05-11 NOTE — PROGRESS NOTE ADULT - SUBJECTIVE AND OBJECTIVE BOX
[facility]  Division of Hospital Medicine  Jill Cummings MD  Pager: [phone]      Patient is a 57y old  Female who presents with a chief complaint of vomiting (11 May 2022 11:25)      SUBJECTIVE / OVERNIGHT EVENTS: Patient stable on room air but called by RN that patient hypothermic. Limited as patient is non verbal   ADDITIONAL REVIEW OF SYSTEMS:    MEDICATIONS  (STANDING):  buDESOnide    Inhalation Suspension 0.5 milliGRAM(s) Inhalation every 12 hours  calcium carbonate    500 mG (Tums) Chewable 1 Tablet(s) Oral daily  enoxaparin Injectable 40 milliGRAM(s) SubCutaneous every 24 hours  ferrous    sulfate Liquid 300 milliGRAM(s) Oral daily  lacosamide Solution 200 milliGRAM(s) Oral <User Schedule>  levETIRAcetam  Solution 600 milliGRAM(s) Oral two times a day  multivitamin 1 Tablet(s) Oral daily  senna 2 Tablet(s) Oral at bedtime  valproic  acid Syrup 750 milliGRAM(s) Oral three times a day    MEDICATIONS  (PRN):  ALBUTerol   0.5% 2.5 milliGRAM(s) Nebulizer every 6 hours PRN Shortness of Breath and/or Wheezing      CAPILLARY BLOOD GLUCOSE      POCT Blood Glucose.: 96 mg/dL (11 May 2022 05:23)    I&O's Summary      PHYSICAL EXAM:  Vital Signs Last 24 Hrs  T(C): 35 (11 May 2022 12:55), Max: 36.6 (10 May 2022 17:53)  T(F): 95 (11 May 2022 12:55), Max: 97.9 (10 May 2022 17:53)  HR: 82 (11 May 2022 12:55) (75 - 109)  BP: 145/82 (11 May 2022 12:55) (101/66 - 159/92)  BP(mean): --  RR: 20 (11 May 2022 12:55) (16 - 20)  SpO2: 100% (11 May 2022 12:55) (94% - 100%)  CONSTITUTIONAL: Middle age woman  NAD, well-developed, well-groomed  RESPIRATORY: On RA< Normal respiratory effort; lungs are clear to auscultation bilaterally  CARDIOVASCULAR: Regular rate and rhythm, normal S1 and S2, no murmur/rub/gallop; No lower extremity edema; Peripheral pulses are 2+ bilaterally  ABDOMEN: Nontender to palpation, normoactive bowel sounds, no rebound/guarding; No hepatosplenomegaly; PEG site, abdominal binder   PSYCH: Non verbal  NEUROLOGY: Moves extremities spontaneously       LABS:                        14.7   11.64 )-----------( 155      ( 11 May 2022 05:30 )             45.2     05-11    133<L>  |  95<L>  |  24<H>  ----------------------------<  100<H>  5.1   |  27  |  0.51    Ca    9.0      11 May 2022 05:30  Phos  4.6     05-11  Mg     1.80     11    TPro  8.0  /  Alb  3.2<L>  /  TBili  0.4  /  DBili  x   /  AST  63<H>  /  ALT  23  /  AlkPhos  81  05-10          Urinalysis Basic - ( 10 May 2022 17:41 )    Color: Yellow / Appearance: Clear / S.026 / pH: x  Gluc: x / Ketone: Trace  / Bili: Negative / Urobili: <2 mg/dL   Blood: x / Protein: Trace / Nitrite: Negative   Leuk Esterase: Negative / RBC: 2 /HPF / WBC 1 /HPF   Sq Epi: x / Non Sq Epi: 1 /HPF / Bacteria: Negative          RADIOLOGY & ADDITIONAL TESTS:  Results Reviewed:   Imaging Personally Reviewed:  Electrocardiogram Personally Reviewed:    COORDINATION OF CARE:  Care Discussed with Consultants/Other Providers [Y/N]:  Prior or Outpatient Records Reviewed [Y/N]:

## 2022-05-11 NOTE — H&P ADULT - NSHPLABSRESULTS_GEN_ALL_CORE
14.7   11.64 )-----------( 155      ( 11 May 2022 05:30 )             45.2     05-10    133<L>  |  93<L>  |  28<H>  ----------------------------<  105<H>  5.7<H>   |  31  |  0.53    Ca    9.5      10 May 2022 18:00  Phos  5.0     05-10  Mg     1.80     05-10    TPro  8.0  /  Alb  3.2<L>  /  TBili  0.4  /  DBili  x   /  AST  63<H>  /  ALT  23  /  AlkPhos  81  05-10      < from: CT Abdomen and Pelvis w/ IV Cont (05.10.22 @ 21:31) >      Motion limited evaluation of the lungs. No overt evidence of pneumonia.    No bowel obstruction or evidence of acute bowel inflammation.   Percutaneous gastrostomy tube within the stomach.    < end of copied text >    EKG: sinus rhythm at 92 PM TWI in AV1

## 2022-05-11 NOTE — H&P ADULT - ASSESSMENT
57F w/ pmhx of cerebral palsy (non-verbal, moans, bedbound at baseline), profound intellectual disability, seizure d/o, constipation, multiple admissions for aspiration PNA, (Pseudomonas & MRSA in sputum) requiring intubation, dysphagia s/p PEG w/ 2 recent admissions for issues related with aspiration presents with episodes of vomiting and hypoxia at [facility].

## 2022-05-11 NOTE — DISCHARGE NOTE PROVIDER - NSDCFUADDAPPT_GEN_ALL_CORE_FT
Podiatry Discharge Instructions:  Follow up: Please follow up with Dr. Janelle Merino within 1 week of discharge from the hospital, please call [phone] for appointment and discuss that you recently were seen in the hospital.  Wound Care: Please apply Santyl, allevyn pad to R heel daily.  Weight bearing: Please weight bear as tolerated in a surgical shoe to R heel  Antibiotics: Please continue as instructed.

## 2022-05-11 NOTE — PATIENT PROFILE ADULT - FALL HARM RISK - HARM RISK INTERVENTIONS
Assistance with ambulation/Assistance OOB with selected safe patient handling equipment/Communicate Risk of Fall with Harm to all staff/Discuss with provider need for PT consult/Monitor gait and stability/Provide patient with walking aids - walker, cane, crutches/Reinforce activity limits and safety measures with patient and family/Tailored Fall Risk Interventions/Use of alarms - bed, chair and/or voice tab/Visual Cue: Yellow wristband and red socks/Bed in lowest position, wheels locked, appropriate side rails in place/Call bell, personal items and telephone in reach/Instruct patient to call for assistance before getting out of bed or chair/Non-slip footwear when patient is out of bed/[place] to call system/Physically safe environment - no spills, clutter or unnecessary equipment/Purposeful Proactive Rounding/Room/bathroom lighting operational, light cord in reach

## 2022-05-11 NOTE — H&P ADULT - PROBLEM SELECTOR PLAN 6
Patient receives Jevity via G-tube  Per charts: patient receives 50mL/hr for 18hours [900mL total] and flush G-tube with 60mL before and after the feed. Flush with 25mL/hr for 18 hours for a total of 450mL  Restart feeds  Nutrition eval

## 2022-05-12 ENCOUNTER — TRANSCRIPTION ENCOUNTER (OUTPATIENT)
Age: 57
End: 2022-05-12

## 2022-05-12 LAB — SARS-COV-2 RNA SPEC QL NAA+PROBE: SIGNIFICANT CHANGE UP

## 2022-05-12 PROCEDURE — 99233 SBSQ HOSP IP/OBS HIGH 50: CPT

## 2022-05-12 RX ORDER — BUDESONIDE, MICRONIZED 100 %
0.5 POWDER (GRAM) MISCELLANEOUS
Qty: 30 | Refills: 0
Start: 2022-05-12 | End: 2022-06-10

## 2022-05-12 RX ORDER — SENNA PLUS 8.6 MG/1
2 TABLET ORAL
Qty: 60 | Refills: 0
Start: 2022-05-12 | End: 2022-06-10

## 2022-05-12 RX ORDER — LEVETIRACETAM 250 MG/1
6 TABLET, FILM COATED ORAL
Qty: 360 | Refills: 0
Start: 2022-05-12 | End: 2022-06-10

## 2022-05-12 RX ORDER — LACTULOSE 10 G/15ML
22.5 SOLUTION ORAL
Qty: 675 | Refills: 0
Start: 2022-05-12 | End: 2022-06-10

## 2022-05-12 RX ORDER — ALBUTEROL 90 UG/1
3 AEROSOL, METERED ORAL
Qty: 360 | Refills: 0
Start: 2022-05-12 | End: 2022-06-10

## 2022-05-12 RX ORDER — COLLAGENASE CLOSTRIDIUM HIST. 250 UNIT/G
1 OINTMENT (GRAM) TOPICAL
Qty: 30 | Refills: 0
Start: 2022-05-12 | End: 2022-06-10

## 2022-05-12 RX ORDER — COLLAGENASE CLOSTRIDIUM HIST. 250 UNIT/G
1 OINTMENT (GRAM) TOPICAL DAILY
Refills: 0 | Status: DISCONTINUED | OUTPATIENT
Start: 2022-05-12 | End: 2022-05-16

## 2022-05-12 RX ORDER — FERROUS SULFATE 325(65) MG
7.5 TABLET ORAL
Qty: 225 | Refills: 0
Start: 2022-05-12 | End: 2022-06-10

## 2022-05-12 RX ORDER — ACETAMINOPHEN 500 MG
650 TABLET ORAL
Qty: 78000 | Refills: 0
Start: 2022-05-12 | End: 2022-06-10

## 2022-05-12 RX ORDER — LACOSAMIDE 50 MG/1
200 TABLET ORAL
Qty: 500 | Refills: 0
Start: 2022-05-12 | End: 2022-06-10

## 2022-05-12 RX ORDER — CALCIUM CARBONATE 500(1250)
1 TABLET ORAL
Qty: 30 | Refills: 0
Start: 2022-05-12 | End: 2022-06-10

## 2022-05-12 RX ORDER — VALPROIC ACID (AS SODIUM SALT) 250 MG/5ML
750 SOLUTION, ORAL ORAL
Qty: 500 | Refills: 0
Start: 2022-05-12 | End: 2022-06-10

## 2022-05-12 RX ADMIN — Medication 1 TABLET(S): at 11:28

## 2022-05-12 RX ADMIN — LACOSAMIDE 200 MILLIGRAM(S): 50 TABLET ORAL at 15:24

## 2022-05-12 RX ADMIN — Medication 750 MILLIGRAM(S): at 22:08

## 2022-05-12 RX ADMIN — LACOSAMIDE 200 MILLIGRAM(S): 50 TABLET ORAL at 05:24

## 2022-05-12 RX ADMIN — Medication 0.5 MILLIGRAM(S): at 10:05

## 2022-05-12 RX ADMIN — SENNA PLUS 2 TABLET(S): 8.6 TABLET ORAL at 22:08

## 2022-05-12 RX ADMIN — Medication 750 MILLIGRAM(S): at 12:49

## 2022-05-12 RX ADMIN — LACOSAMIDE 200 MILLIGRAM(S): 50 TABLET ORAL at 22:08

## 2022-05-12 RX ADMIN — Medication 300 MILLIGRAM(S): at 11:28

## 2022-05-12 RX ADMIN — Medication 0.5 MILLIGRAM(S): at 20:58

## 2022-05-12 RX ADMIN — Medication 750 MILLIGRAM(S): at 05:22

## 2022-05-12 RX ADMIN — ENOXAPARIN SODIUM 40 MILLIGRAM(S): 100 INJECTION SUBCUTANEOUS at 05:25

## 2022-05-12 RX ADMIN — LACTULOSE 15 GRAM(S): 10 SOLUTION ORAL at 11:28

## 2022-05-12 RX ADMIN — Medication 1 APPLICATION(S): at 11:28

## 2022-05-12 RX ADMIN — LEVETIRACETAM 600 MILLIGRAM(S): 250 TABLET, FILM COATED ORAL at 05:23

## 2022-05-12 RX ADMIN — LEVETIRACETAM 600 MILLIGRAM(S): 250 TABLET, FILM COATED ORAL at 17:12

## 2022-05-12 NOTE — CONSULT NOTE ADULT - SUBJECTIVE AND OBJECTIVE BOX
Podiatry pager #: [phone]/ [phone]    Patient is a 57y old  Female who presents with a chief complaint of vomiting (11 May 2022 13:03)      HPI:  57F w/ pmhx of cerebral palsy (non-verbal, moans, bedbound at baseline), profound intellectual disability, seizure d/o, constipation, multiple admissions for aspiration PNA, (Pseudomonas & MRSA in sputum) requiring intubation, dysphagia s/p PEG w/ 2 recent admissions for issues related with aspiration presents with episodes of vomiting and hypoxia at [facility]. Patient is non verbal and moans at bedside. Patient is awake and alert. ROS limited secondary to patient's condition. Per charts; Patient's Pulse ox was 78 and three episodes of vomiting.  Podiatry consulted for evaluation of right heel wound, had been seen by podiatry on previous admission at [facility], no interventions planned, wound never appeared clinically infected.      PAST MEDICAL & SURGICAL HISTORY:  Cerebral palsy      Seizure disorder      Constipation      Intellectual disability      Asthma      Gastrostomy tube dependent          MEDICATIONS  (STANDING):  buDESOnide    Inhalation Suspension 0.5 milliGRAM(s) Inhalation every 12 hours  calcium carbonate    500 mG (Tums) Chewable 1 Tablet(s) Oral daily  enoxaparin Injectable 40 milliGRAM(s) SubCutaneous every 24 hours  ferrous    sulfate Liquid 300 milliGRAM(s) Oral daily  lacosamide Solution 200 milliGRAM(s) Oral <User Schedule>  lactulose Syrup 15 Gram(s) Enteral Tube daily  levETIRAcetam  Solution 600 milliGRAM(s) Oral two times a day  multivitamin 1 Tablet(s) Oral daily  senna 2 Tablet(s) Oral at bedtime  valproic  acid Syrup 750 milliGRAM(s) Oral three times a day    MEDICATIONS  (PRN):  ALBUTerol   0.5% 2.5 milliGRAM(s) Nebulizer every 6 hours PRN Shortness of Breath and/or Wheezing      Allergies    Topamax (Unknown)    Intolerances        VITALS:    Vital Signs Last 24 Hrs  T(C): 36.4 (12 May 2022 04:30), Max: 36.8 (11 May 2022 17:30)  T(F): 97.5 (12 May 2022 04:30), Max: 98.2 (11 May 2022 17:30)  HR: 98 (12 May 2022 04:30) (81 - 102)  BP: 122/66 (12 May 2022 04:30) (101/66 - 145/82)  BP(mean): --  RR: 19 (12 May 2022 04:30) (16 - 20)  SpO2: 98% (12 May 2022 04:30) (96% - 100%)    LABS:    58yo presents w/ R heel wound  -pt seen and examined  -Afebrile, no leukocytosis  -R heel wound to subQ, no periwound erythema, no malodor, no drainage, no acute signs of infection  -No indication for wound culture as there are no acute signs of infection  -Plan for local wound care, santyl collagenase w/ allevyn pads daily  -Please offload bilateral heels w/ z flow booties  -Discussed w/ attending                      14.7   11.64 )-----------( 155      ( 11 May 2022 05:30 )             45.2       05-11    133<L>  |  95<L>  |  24<H>  ----------------------------<  100<H>  5.1   |  27  |  0.51    Ca    9.0      11 May 2022 05:30  Phos  4.6     05-11  Mg     1.80     05-11    TPro  8.0  /  Alb  3.2<L>  /  TBili  0.4  /  DBili  x   /  AST  63<H>  /  ALT  23  /  AlkPhos  81  05-10      CAPILLARY BLOOD GLUCOSE      POCT Blood Glucose.: 80 mg/dL (11 May 2022 19:56)          LOWER EXTREMITY PHYSICAL EXAM:  Vascular: DP/PT 1/4, B/L, CFT <3 seconds B/L, Temperature gradient WNL, B/L.   Neuro: Epicritic sensation diminished to the level of digits, B/L.  Musculoskeletal/Ortho: lower extremity contracture  Skin: R heel wound to subQ, no periwound erythema, no malodor, no drainage, no acute signs of infection    RADIOLOGY & ADDITIONAL STUDIES:

## 2022-05-12 NOTE — DISCHARGE NOTE NURSING/CASE MANAGEMENT/SOCIAL WORK - NSDCPEFALRISK_GEN_ALL_CORE
For information on Fall & Injury Prevention, visit: [email or web address] OR  [email or web address] OR  https://www.cdc.gov/steadi/patient.html

## 2022-05-12 NOTE — PROGRESS NOTE ADULT - SUBJECTIVE AND OBJECTIVE BOX
[facility]  Division of Hospital Medicine  Jill Cummings MD  Pager: [phone]      Patient is a 57y old  Female who presents with a chief complaint of vomiting (12 May 2022 09:55)      SUBJECTIVE / OVERNIGHT EVENTS: Updated mother Evonne - [phone]. Reports daughter frequently has low temperature. REports she was in the hospital two days ago had chest xray was unremarkable. Happy to hear her daughter is cleared for discharge. Limited as patient is non verbal   ADDITIONAL REVIEW OF SYSTEMS:    MEDICATIONS  (STANDING):  buDESOnide    Inhalation Suspension 0.5 milliGRAM(s) Inhalation every 12 hours  calcium carbonate    500 mG (Tums) Chewable 1 Tablet(s) Oral daily  collagenase Ointment 1 Application(s) Topical daily  enoxaparin Injectable 40 milliGRAM(s) SubCutaneous every 24 hours  ferrous    sulfate Liquid 300 milliGRAM(s) Oral daily  lacosamide Solution 200 milliGRAM(s) Oral <User Schedule>  lactulose Syrup 15 Gram(s) Enteral Tube daily  levETIRAcetam  Solution 600 milliGRAM(s) Oral two times a day  multivitamin 1 Tablet(s) Oral daily  senna 2 Tablet(s) Oral at bedtime  valproic  acid Syrup 750 milliGRAM(s) Oral three times a day    MEDICATIONS  (PRN):  ALBUTerol   0.5% 2.5 milliGRAM(s) Nebulizer every 6 hours PRN Shortness of Breath and/or Wheezing      CAPILLARY BLOOD GLUCOSE      POCT Blood Glucose.: 80 mg/dL (11 May 2022 19:56)    I&O's Summary      PHYSICAL EXAM:  Vital Signs Last 24 Hrs  T(C): 37 (12 May 2022 11:05), Max: 37 (12 May 2022 11:05)  T(F): 98.6 (12 May 2022 11:05), Max: 98.6 (12 May 2022 11:05)  HR: 98 (12 May 2022 11:05) (81 - 102)  BP: 123/72 (12 May 2022 11:05) (106/71 - 145/82)  BP(mean): --  RR: 18 (12 May 2022 11:05) (18 - 20)  SpO2: 98% (12 May 2022 11:05) (96% - 100%)  CONSTITUTIONAL: Middle age woman  NAD, well-developed, well-groomed  RESPIRATORY: On RA< Normal respiratory effort; lungs are clear to auscultation bilaterally  CARDIOVASCULAR: Regular rate and rhythm, normal S1 and S2, no murmur/rub/gallop; No lower extremity edema; Peripheral pulses are 2+ bilaterally  ABDOMEN: Nontender to palpation, normoactive bowel sounds, no rebound/guarding; No hepatosplenomegaly; PEG site, abdominal binder   PSYCH: Non verbal  NEUROLOGY: Moves extremities spontaneously     LABS:                        14.7   11.64 )-----------( 155      ( 11 May 2022 05:30 )             45.2     05-11    133<L>  |  95<L>  |  24<H>  ----------------------------<  100<H>  5.1   |  27  |  0.51    Ca    9.0      11 May 2022 05:30  Phos  4.6     05-11  Mg     1.80     05-11    TPro  8.0  /  Alb  3.2<L>  /  TBili  0.4  /  DBili  x   /  AST  63<H>  /  ALT  23  /  AlkPhos  81  05-10          Urinalysis Basic - ( 10 May 2022 17:41 )    Color: Yellow / Appearance: Clear / S.026 / pH: x  Gluc: x / Ketone: Trace  / Bili: Negative / Urobili: <2 mg/dL   Blood: x / Protein: Trace / Nitrite: Negative   Leuk Esterase: Negative / RBC: 2 /HPF / WBC 1 /HPF   Sq Epi: x / Non Sq Epi: 1 /HPF / Bacteria: Negative        Culture - Urine (collected 10 May 2022 18:38)  Source: Clean Catch Clean Catch (Midstream)  Final Report (11 May 2022 17:47):    <10,000 CFU/mL Normal Urogenital Charlette        RADIOLOGY & ADDITIONAL TESTS:  Results Reviewed:   Imaging Personally Reviewed:  Electrocardiogram Personally Reviewed:    COORDINATION OF CARE:  Care Discussed with Consultants/Other Providers [Y/N]:  Prior or Outpatient Records Reviewed [Y/N]:

## 2022-05-12 NOTE — PROGRESS NOTE ADULT - PROBLEM SELECTOR PLAN 3
Found to be hypothermic  unclear etiology: Infectious vs. hypothyroidism vs.  adrenal insufficiency, central. Resolved   - F/U infectious work up, blood culture (will be after 1 dose of Vanc/Zosyn   - Bear hugger  - F/u TSH and cortisol

## 2022-05-12 NOTE — DISCHARGE NOTE NURSING/CASE MANAGEMENT/SOCIAL WORK - PATIENT PORTAL LINK FT
You can access the FollowMyHealth Patient Portal offered by [facility] by registering at the following website: [email or web address]. By joining [facility]’s FollowMyHealth portal, you will also be able to view your health information using other applications (apps) compatible with our system.

## 2022-05-12 NOTE — CONSULT NOTE ADULT - ASSESSMENT
58yo presents w/ R heel wound  -pt seen and examined  -Afebrile, WBC 11.64  -R heel wound to subQ, no periwound erythema, no malodor, no drainage, no acute signs of infection  -No indication for wound culture as there are no acute signs of infection  -Plan for local wound care, santyl collagenase w/ allevyn pads daily  -Please offload bilateral heels w/ z flow booties  -Discussed w/ attending

## 2022-05-12 NOTE — DISCHARGE NOTE NURSING/CASE MANAGEMENT/SOCIAL WORK - NSDCFUADDAPPT_GEN_ALL_CORE_FT
Podiatry Discharge Instructions:  Follow up: Please follow up with Dr. Janelle Merino within 1 week of discharge from the hospital, please call [phone] for appointment and discuss that you recently were seen in the hospital.  Wound Care: Please apply Santyl, allevyn pad to R heel daily.  Weight bearing: Please weight bear as tolerated in a surgical shoe to R heel  Antibiotics: Please continue as instructed.

## 2022-05-13 LAB
-  STAPHYLOCOCCUS EPIDERMIDIS, METHICILLIN RESISTANT: SIGNIFICANT CHANGE UP
CULTURE RESULTS: SIGNIFICANT CHANGE UP
GLUCOSE BLDC GLUCOMTR-MCNC: 113 MG/DL — HIGH (ref 70–99)
GRAM STN FLD: SIGNIFICANT CHANGE UP
METHOD TYPE: SIGNIFICANT CHANGE UP
ORGANISM # SPEC MICROSCOPIC CNT: SIGNIFICANT CHANGE UP
ORGANISM # SPEC MICROSCOPIC CNT: SIGNIFICANT CHANGE UP
SPECIMEN SOURCE: SIGNIFICANT CHANGE UP

## 2022-05-13 PROCEDURE — 99233 SBSQ HOSP IP/OBS HIGH 50: CPT

## 2022-05-13 RX ORDER — SODIUM BICARBONATE 1 MEQ/ML
650 SYRINGE (ML) INTRAVENOUS ONCE
Refills: 0 | Status: COMPLETED | OUTPATIENT
Start: 2022-05-13 | End: 2022-05-14

## 2022-05-13 RX ORDER — CEFTRIAXONE 500 MG/1
1000 INJECTION, POWDER, FOR SOLUTION INTRAMUSCULAR; INTRAVENOUS EVERY 24 HOURS
Refills: 0 | Status: DISCONTINUED | OUTPATIENT
Start: 2022-05-13 | End: 2022-05-16

## 2022-05-13 RX ORDER — VANCOMYCIN HCL 1 G
1000 VIAL (EA) INTRAVENOUS EVERY 24 HOURS
Refills: 0 | Status: DISCONTINUED | OUTPATIENT
Start: 2022-05-13 | End: 2022-05-13

## 2022-05-13 RX ADMIN — ENOXAPARIN SODIUM 40 MILLIGRAM(S): 100 INJECTION SUBCUTANEOUS at 05:07

## 2022-05-13 RX ADMIN — Medication 1 TABLET(S): at 11:45

## 2022-05-13 RX ADMIN — Medication 300 MILLIGRAM(S): at 11:45

## 2022-05-13 RX ADMIN — Medication 750 MILLIGRAM(S): at 13:18

## 2022-05-13 RX ADMIN — Medication 0.5 MILLIGRAM(S): at 23:16

## 2022-05-13 RX ADMIN — LACOSAMIDE 200 MILLIGRAM(S): 50 TABLET ORAL at 15:10

## 2022-05-13 RX ADMIN — Medication 250 MILLIGRAM(S): at 02:43

## 2022-05-13 RX ADMIN — Medication 1 APPLICATION(S): at 15:01

## 2022-05-13 RX ADMIN — SENNA PLUS 2 TABLET(S): 8.6 TABLET ORAL at 21:14

## 2022-05-13 RX ADMIN — Medication 750 MILLIGRAM(S): at 05:10

## 2022-05-13 RX ADMIN — Medication 0.5 MILLIGRAM(S): at 06:57

## 2022-05-13 RX ADMIN — LACTULOSE 15 GRAM(S): 10 SOLUTION ORAL at 11:45

## 2022-05-13 RX ADMIN — LACOSAMIDE 200 MILLIGRAM(S): 50 TABLET ORAL at 22:55

## 2022-05-13 RX ADMIN — CEFTRIAXONE 100 MILLIGRAM(S): 500 INJECTION, POWDER, FOR SOLUTION INTRAMUSCULAR; INTRAVENOUS at 15:08

## 2022-05-13 RX ADMIN — LACOSAMIDE 200 MILLIGRAM(S): 50 TABLET ORAL at 05:07

## 2022-05-13 RX ADMIN — Medication 750 MILLIGRAM(S): at 21:14

## 2022-05-13 RX ADMIN — LEVETIRACETAM 600 MILLIGRAM(S): 250 TABLET, FILM COATED ORAL at 17:12

## 2022-05-13 RX ADMIN — LEVETIRACETAM 600 MILLIGRAM(S): 250 TABLET, FILM COATED ORAL at 05:08

## 2022-05-13 NOTE — DIETITIAN INITIAL EVALUATION ADULT - NS FNS DIET ORDER
NPO with Tube Feed: Tube Feeding Modality: Gastrostomy    Jevity 1.2 Shady (JEVITY1.2RTH)  Total Volume for 24 Hours (mL): 1200    Continuous  Starting Tube Feed Rate {mL per Hour}: 20   Increase Tube Feed Rate by (mL): 10     Every 4 hours  Until Goal Tube Feed Rate (mL per Hour): 50   Tube Feed Duration (in Hours): 24    Tube Feed Start Time: 20:00  Free Water Flush  Bolus   Total Volume per Flush (mL): 200   Frequency: Every 8 Hours   Total Daily Volume of Flush (mL): 300 (05-11-22 @ 22:31) [Active]

## 2022-05-13 NOTE — PROGRESS NOTE ADULT - SUBJECTIVE AND OBJECTIVE BOX
[facility]  Division of Hospital Medicine  Jill Cummings MD  Pager: [phone]      Patient is a 57y old  Female who presents with a chief complaint of vomiting (12 May 2022 11:40)      SUBJECTIVE / OVERNIGHT EVENTS: Patient blood culture came back positive staph epi likely contaminant   ADDITIONAL REVIEW OF SYSTEMS:    MEDICATIONS  (STANDING):  buDESOnide    Inhalation Suspension 0.5 milliGRAM(s) Inhalation every 12 hours  calcium carbonate    500 mG (Tums) Chewable 1 Tablet(s) Oral daily  collagenase Ointment 1 Application(s) Topical daily  enoxaparin Injectable 40 milliGRAM(s) SubCutaneous every 24 hours  ferrous    sulfate Liquid 300 milliGRAM(s) Oral daily  lacosamide Solution 200 milliGRAM(s) Oral <User Schedule>  lactulose Syrup 15 Gram(s) Enteral Tube daily  levETIRAcetam  Solution 600 milliGRAM(s) Oral two times a day  multivitamin 1 Tablet(s) Oral daily  senna 2 Tablet(s) Oral at bedtime  valproic  acid Syrup 750 milliGRAM(s) Oral three times a day  vancomycin  IVPB 1000 milliGRAM(s) IV Intermittent every 24 hours    MEDICATIONS  (PRN):  ALBUTerol   0.5% 2.5 milliGRAM(s) Nebulizer every 6 hours PRN Shortness of Breath and/or Wheezing      CAPILLARY BLOOD GLUCOSE        I&O's Summary      PHYSICAL EXAM:  Vital Signs Last 24 Hrs  T(C): 37 (13 May 2022 03:02), Max: 37.2 (12 May 2022 19:05)  T(F): 98.6 (13 May 2022 03:02), Max: 98.9 (12 May 2022 19:05)  HR: 89 (13 May 2022 07:00) (89 - 100)  BP: 134/59 (13 May 2022 03:02) (111/60 - 134/59)  BP(mean): --  RR: 18 (13 May 2022 03:02) (18 - 18)  SpO2: 97% (13 May 2022 07:00) (97% - 100%)  CONSTITUTIONAL: NAD, well-developed, well-groomed  EYES: PERRLA; conjunctiva and sclera clear  ENMT: Moist oral mucosa, no pharyngeal injection or exudates; normal dentition  NECK: Supple, no palpable masses; no thyromegaly  RESPIRATORY: Normal respiratory effort; lungs are clear to auscultation bilaterally  CARDIOVASCULAR: Regular rate and rhythm, normal S1 and S2, no murmur/rub/gallop; No lower extremity edema; Peripheral pulses are 2+ bilaterally  ABDOMEN: Nontender to palpation, normoactive bowel sounds, no rebound/guarding; No hepatosplenomegaly  MUSCULOSKELETAL:  Normal gait; no clubbing or cyanosis of digits; no joint swelling or tenderness to palpation  PSYCH: A+O to person, place, and time; affect appropriate  NEUROLOGY: CN 2-12 are intact and symmetric; no gross sensory deficits   SKIN: No rashes; no palpable lesions    LABS:                    Culture - Blood (collected 11 May 2022 14:00)  Source: .Blood Blood  Gram Stain (13 May 2022 02:02):    Growth in aerobic bottle: Gram Positive Cocci in Clusters  Preliminary Report (13 May 2022 02:03):    Growth in aerobic bottle: Gram Positive Cocci in Clusters    ***Blood Panel PCR results on this specimen are available    approximately 3 hours after the Gram stain result.***    Gram stain, PCR, and/or culture results may not always    correspond due to difference in methodologies.    ************************************************************    This PCR assay was performed by multiplex PCR. This    Assay tests for 66 bacterial and resistance gene targets.    Please refer to the [facility] Labs test directory    at [email or web address] for details.  Organism: Blood Culture PCR (13 May 2022 03:38)  Organism: Blood Culture PCR (13 May 2022 03:38)    Culture - Urine (collected 10 May 2022 18:38)  Source: Clean Catch Clean Catch (Midstream)  Final Report (11 May 2022 17:47):    <10,000 CFU/mL Normal Urogenital Charlette        RADIOLOGY & ADDITIONAL TESTS:  Results Reviewed:   Imaging Personally Reviewed:  Electrocardiogram Personally Reviewed:    COORDINATION OF CARE:  Care Discussed with Consultants/Other Providers [Y/N]:  Prior or Outpatient Records Reviewed [Y/N]:   [facility]  Division of Hospital Medicine  Jill Cummings MD  Pager: [phone]      Patient is a 57y old  Female who presents with a chief complaint of vomiting (12 May 2022 11:40)      SUBJECTIVE / OVERNIGHT EVENTS: Patient blood culture came back positive staph epi likely contaminant Limited as patient is non verbal. however, patient appears comfortable and non toxic at bedside   ADDITIONAL REVIEW OF SYSTEMS:    MEDICATIONS  (STANDING):  buDESOnide    Inhalation Suspension 0.5 milliGRAM(s) Inhalation every 12 hours  calcium carbonate    500 mG (Tums) Chewable 1 Tablet(s) Oral daily  collagenase Ointment 1 Application(s) Topical daily  enoxaparin Injectable 40 milliGRAM(s) SubCutaneous every 24 hours  ferrous    sulfate Liquid 300 milliGRAM(s) Oral daily  lacosamide Solution 200 milliGRAM(s) Oral <User Schedule>  lactulose Syrup 15 Gram(s) Enteral Tube daily  levETIRAcetam  Solution 600 milliGRAM(s) Oral two times a day  multivitamin 1 Tablet(s) Oral daily  senna 2 Tablet(s) Oral at bedtime  valproic  acid Syrup 750 milliGRAM(s) Oral three times a day  vancomycin  IVPB 1000 milliGRAM(s) IV Intermittent every 24 hours    MEDICATIONS  (PRN):  ALBUTerol   0.5% 2.5 milliGRAM(s) Nebulizer every 6 hours PRN Shortness of Breath and/or Wheezing      CAPILLARY BLOOD GLUCOSE        I&O's Summary      PHYSICAL EXAM:  Vital Signs Last 24 Hrs  T(C): 37 (13 May 2022 03:02), Max: 37.2 (12 May 2022 19:05)  T(F): 98.6 (13 May 2022 03:02), Max: 98.9 (12 May 2022 19:05)  HR: 89 (13 May 2022 07:00) (89 - 100)  BP: 134/59 (13 May 2022 03:02) (111/60 - 134/59)  BP(mean): --  RR: 18 (13 May 2022 03:02) (18 - 18)  SpO2: 97% (13 May 2022 07:00) (97% - 100%)  CONSTITUTIONAL: Middle age woman  NAD, well-developed, well-groomed  RESPIRATORY: On RA< Normal respiratory effort; lungs are clear to auscultation bilaterally  CARDIOVASCULAR: Regular rate and rhythm, normal S1 and S2, no murmur/rub/gallop; No lower extremity edema; Peripheral pulses are 2+ bilaterally  ABDOMEN: Nontender to palpation, normoactive bowel sounds, no rebound/guarding; No hepatosplenomegaly; PEG site, abdominal binder   PSYCH: Non verbal  NEUROLOGY: Moves extremities spontaneously       LABS:                    Culture - Blood (collected 11 May 2022 14:00)  Source: .Blood Blood  Gram Stain (13 May 2022 02:02):    Growth in aerobic bottle: Gram Positive Cocci in Clusters  Preliminary Report (13 May 2022 02:03):    Growth in aerobic bottle: Gram Positive Cocci in Clusters    ***Blood Panel PCR results on this specimen are available    approximately 3 hours after the Gram stain result.***    Gram stain, PCR, and/or culture results may not always    correspond due to difference in methodologies.    ************************************************************    This PCR assay was performed by multiplex PCR. This    Assay tests for 66 bacterial and resistance gene targets.    Please refer to the [facility] Labs test directory    at [email or web address] for details.  Organism: Blood Culture PCR (13 May 2022 03:38)  Organism: Blood Culture PCR (13 May 2022 03:38)    Culture - Urine (collected 10 May 2022 18:38)  Source: Clean Catch Clean Catch (Midstream)  Final Report (11 May 2022 17:47):    <10,000 CFU/mL Normal Urogenital Charlette        RADIOLOGY & ADDITIONAL TESTS:  Results Reviewed:   Imaging Personally Reviewed:  Electrocardiogram Personally Reviewed:    COORDINATION OF CARE:  Care Discussed with Consultants/Other Providers [Y/N]:  Prior or Outpatient Records Reviewed [Y/N]:

## 2022-05-13 NOTE — DIETITIAN INITIAL EVALUATION ADULT - ADD RECOMMEND
1. Continue Tube feeding as ordered; Monitor Tube Feeding tolerance; Add free water flushes per MD discretion;   2. Bowel regimen per MD discretion;  3. Anti-emetic PRN per MD discretion;   4. Change PO Multivitamin 1 tab/day to Multivitamin/minerals/iron Solution (CENTRUM) - 15 milliLiter(s) daily, for micronutrient coverage;  5. Monitor labs, hydration status;

## 2022-05-13 NOTE — DIETITIAN INITIAL EVALUATION ADULT - PERTINENT LABORATORY DATA
(5/11) WBC 11.64 H, Hct 45.2 H, Na 133 L, Cl 95 L, phosphorus 4.6 H, BUN 24 H, Glu 100 H, HDL 43 L;       (5/10) Albumin 3.2 L, AST 63 H    A1C with Estimated Average Glucose Result: 4.9 % (05-11-22 @ 05:30)  A1C with Estimated Average Glucose Result: 5.1 % (02-05-22 @ 15:24)

## 2022-05-13 NOTE — DIETITIAN INITIAL EVALUATION ADULT - OTHER INFO
Pt 56 yo female with PMHx of cerebral palsy (non-verbal, moans, bedbound at baseline), profound intellectual disability, seizure d/o, constipation, multiple admissions for aspiration PNA requiring intubation, dysphagia s/p PEG now presented with episodes of vomiting and hypoxia - per chart review.     At time of visit, Pt asleep. PT NPO with Tube Feeding via PEG: Jevity 1.2cal @ 50 ml/hr infusing at time of visit. Per nurse, Pt appears tolerating Tube Feeding well; no vomiting/diarrhea @ present. Per flow sheet, Pt fecal incontinence. But nurse not sure when Pt had her last bowel movement. Rec: Add bowel regimen per MD discretion; Add Lactobacillus Acidophilus (probiotic supplementation) for improved gut function. Of note Pt with pressure injuries to R heel. Rec to add Prosource - 3x daily. Rec change PO Multivitamin to Multivitamin/minerals/iron Solution (CENTRUM) - 15 milliLiter(s) daily, for micronutrient coverage.     Unable to discuss weight history. Of note, Pt's weights: 92.7 KG (5/11), 99.8 KG (HIE - 12/3/21). Of note Pt with 3+/4+/non-pitting edema to extremities. Case discussed with nurse. RDN remains available, nurse made aware.

## 2022-05-13 NOTE — CHART NOTE - NSCHARTNOTEFT_GEN_A_CORE
Notified by RN that patient's blood cultures from 5/10 resulted  - growth in aerobic bottle- gram positive cocci in clusters. Patient is afebrile at this time. Started on vancomycin 1 gm daily

## 2022-05-14 DIAGNOSIS — R78.81 BACTEREMIA: ICD-10-CM

## 2022-05-14 LAB
ANION GAP SERPL CALC-SCNC: 7 MMOL/L — SIGNIFICANT CHANGE UP (ref 7–14)
BUN SERPL-MCNC: 14 MG/DL — SIGNIFICANT CHANGE UP (ref 7–23)
CALCIUM SERPL-MCNC: 8.4 MG/DL — SIGNIFICANT CHANGE UP (ref 8.4–10.5)
CHLORIDE SERPL-SCNC: 96 MMOL/L — LOW (ref 98–107)
CO2 SERPL-SCNC: 33 MMOL/L — HIGH (ref 22–31)
CREAT SERPL-MCNC: 0.45 MG/DL — LOW (ref 0.5–1.3)
EGFR: 113 ML/MIN/1.73M2 — SIGNIFICANT CHANGE UP
GLUCOSE SERPL-MCNC: 99 MG/DL — SIGNIFICANT CHANGE UP (ref 70–99)
HCT VFR BLD CALC: 39.6 % — SIGNIFICANT CHANGE UP (ref 34.5–45)
HGB BLD-MCNC: 12.8 G/DL — SIGNIFICANT CHANGE UP (ref 11.5–15.5)
MCHC RBC-ENTMCNC: 32.3 GM/DL — SIGNIFICANT CHANGE UP (ref 32–36)
MCHC RBC-ENTMCNC: 34.8 PG — HIGH (ref 27–34)
MCV RBC AUTO: 107.6 FL — HIGH (ref 80–100)
NRBC # BLD: 0 /100 WBCS — SIGNIFICANT CHANGE UP
NRBC # FLD: 0 K/UL — SIGNIFICANT CHANGE UP
PLATELET # BLD AUTO: 155 K/UL — SIGNIFICANT CHANGE UP (ref 150–400)
POTASSIUM SERPL-MCNC: 4.8 MMOL/L — SIGNIFICANT CHANGE UP (ref 3.5–5.3)
POTASSIUM SERPL-SCNC: 4.8 MMOL/L — SIGNIFICANT CHANGE UP (ref 3.5–5.3)
RBC # BLD: 3.68 M/UL — LOW (ref 3.8–5.2)
RBC # FLD: 13.7 % — SIGNIFICANT CHANGE UP (ref 10.3–14.5)
SODIUM SERPL-SCNC: 136 MMOL/L — SIGNIFICANT CHANGE UP (ref 135–145)
WBC # BLD: 7.91 K/UL — SIGNIFICANT CHANGE UP (ref 3.8–10.5)
WBC # FLD AUTO: 7.91 K/UL — SIGNIFICANT CHANGE UP (ref 3.8–10.5)

## 2022-05-14 PROCEDURE — 99233 SBSQ HOSP IP/OBS HIGH 50: CPT

## 2022-05-14 RX ADMIN — Medication 0.5 MILLIGRAM(S): at 22:10

## 2022-05-14 RX ADMIN — ENOXAPARIN SODIUM 40 MILLIGRAM(S): 100 INJECTION SUBCUTANEOUS at 07:49

## 2022-05-14 RX ADMIN — LEVETIRACETAM 600 MILLIGRAM(S): 250 TABLET, FILM COATED ORAL at 07:47

## 2022-05-14 RX ADMIN — Medication 0.5 MILLIGRAM(S): at 09:44

## 2022-05-14 RX ADMIN — LEVETIRACETAM 600 MILLIGRAM(S): 250 TABLET, FILM COATED ORAL at 18:15

## 2022-05-14 RX ADMIN — Medication 650 MILLIGRAM(S): at 07:50

## 2022-05-14 RX ADMIN — CEFTRIAXONE 100 MILLIGRAM(S): 500 INJECTION, POWDER, FOR SOLUTION INTRAMUSCULAR; INTRAVENOUS at 15:05

## 2022-05-14 RX ADMIN — LACTULOSE 15 GRAM(S): 10 SOLUTION ORAL at 12:45

## 2022-05-14 RX ADMIN — Medication 750 MILLIGRAM(S): at 21:06

## 2022-05-14 RX ADMIN — Medication 1 TABLET(S): at 12:45

## 2022-05-14 RX ADMIN — Medication 1 APPLICATION(S): at 12:56

## 2022-05-14 RX ADMIN — Medication 750 MILLIGRAM(S): at 07:48

## 2022-05-14 RX ADMIN — LACOSAMIDE 200 MILLIGRAM(S): 50 TABLET ORAL at 07:47

## 2022-05-14 RX ADMIN — Medication 300 MILLIGRAM(S): at 12:45

## 2022-05-14 RX ADMIN — LACOSAMIDE 200 MILLIGRAM(S): 50 TABLET ORAL at 15:05

## 2022-05-14 RX ADMIN — LACOSAMIDE 200 MILLIGRAM(S): 50 TABLET ORAL at 21:05

## 2022-05-14 RX ADMIN — Medication 750 MILLIGRAM(S): at 13:30

## 2022-05-14 NOTE — PROGRESS NOTE ADULT - SUBJECTIVE AND OBJECTIVE BOX
Patient is a 57y old  Female who presents with a chief complaint of vomiting (12 May 2022 11:40)      SUBJECTIVE / OVERNIGHT EVENTS: Patient blood culture came back positive staph epi likely contaminant, pending repepeat.  ROS Limited as patient is non verbal but patient afebrile, appears comfortable, non toxic at bedside   ADDITIONAL REVIEW OF SYSTEMS:    MEDICATIONS  (STANDING):  buDESOnide    Inhalation Suspension 0.5 milliGRAM(s) Inhalation every 12 hours  calcium carbonate    500 mG (Tums) Chewable 1 Tablet(s) Oral daily  collagenase Ointment 1 Application(s) Topical daily  enoxaparin Injectable 40 milliGRAM(s) SubCutaneous every 24 hours  ferrous    sulfate Liquid 300 milliGRAM(s) Oral daily  lacosamide Solution 200 milliGRAM(s) Oral <User Schedule>  lactulose Syrup 15 Gram(s) Enteral Tube daily  levETIRAcetam  Solution 600 milliGRAM(s) Oral two times a day  multivitamin 1 Tablet(s) Oral daily  senna 2 Tablet(s) Oral at bedtime  valproic  acid Syrup 750 milliGRAM(s) Oral three times a day  vancomycin  IVPB 1000 milliGRAM(s) IV Intermittent every 24 hours    MEDICATIONS  (PRN):  ALBUTerol   0.5% 2.5 milliGRAM(s) Nebulizer every 6 hours PRN Shortness of Breath and/or Wheezing      CAPILLARY BLOOD GLUCOSE        I&O's Summary      PHYSICAL EXAM:  Vital Signs Last 24 Hrs  T(C): 37 (13 May 2022 03:02), Max: 37.2 (12 May 2022 19:05)  T(F): 98.6 (13 May 2022 03:02), Max: 98.9 (12 May 2022 19:05)  HR: 89 (13 May 2022 07:00) (89 - 100)  BP: 134/59 (13 May 2022 03:02) (111/60 - 134/59)  BP(mean): --  RR: 18 (13 May 2022 03:02) (18 - 18)  SpO2: 97% (13 May 2022 07:00) (97% - 100%)      CONSTITUTIONAL: Middle age woman  NAD, well-developed, well-groomed  RESPIRATORY: on RA, Normal respiratory effort; lungs are clear to auscultation bilaterally  CARDIOVASCULAR: Regular rate and rhythm, normal S1 and S2, no murmur/rub/gallop; No lower extremity edema; Peripheral pulses are 2+ bilaterally  ABDOMEN: Nontender to palpation, normoactive bowel sounds, no rebound/guarding; No hepatosplenomegaly; PEG site c/D/I, abdominal binder   PSYCH: Non verbal- baseline  NEUROLOGY: Moves extremities spontaneously       LABS:                          12.8   7.91  )-----------( 155      ( 14 May 2022 04:16 )             39.6   05-14    136  |  96<L>  |  14  ----------------------------<  99  4.8   |  33<H>  |  0.45<L>    Ca    8.4      14 May 2022 04:16              Culture - Blood (collected 11 May 2022 14:00)  Source: .Blood Blood  Gram Stain (13 May 2022 02:02):    Growth in aerobic bottle: Gram Positive Cocci in Clusters  Preliminary Report (13 May 2022 02:03):    Growth in aerobic bottle: Gram Positive Cocci in Clusters    ***Blood Panel PCR results on this specimen are available    approximately 3 hours after the Gram stain result.***    Gram stain, PCR, and/or culture results may not always    correspond due to difference in methodologies.    ************************************************************    This PCR assay was performed by multiplex PCR. This    Assay tests for 66 bacterial and resistance gene targets.    Please refer to the [facility] Labs test directory    at [email or web address] for details.  Organism: Blood Culture PCR (13 May 2022 03:38)  Organism: Blood Culture PCR (13 May 2022 03:38)    Culture - Urine (collected 10 May 2022 18:38)  Source: Clean Catch Clean Catch (Midstream)  Final Report (11 May 2022 17:47):    <10,000 CFU/mL Normal Urogenital Charlette        RADIOLOGY & ADDITIONAL TESTS:  Results Reviewed:   Imaging Personally Reviewed:  Electrocardiogram Personally Reviewed:    COORDINATION OF CARE:  Care Discussed with Consultants/Other Providers [Y/N]:  Prior or Outpatient Records Reviewed [Y/N]:

## 2022-05-14 NOTE — PROGRESS NOTE ADULT - PROBLEM SELECTOR PLAN 3
No episodes of vomiting currently  CT abdomen: No bowel obstruction or evidence of acute bowel inflammation.   Percutaneous gastrostomy tube within the stomach.  C/w peg feeds, pt tolerating without issue   Monitor for further episodes

## 2022-05-14 NOTE — PROGRESS NOTE ADULT - PROBLEM SELECTOR PLAN 4
Found to be hypothermic  unclear etiology: Infectious vs. hypothyroidism vs.  adrenal insufficiency, central. Resolved   - Norm temp,   - Bcx (+) staph epi concerning for contaminant as pt is afebrile and non toxic appearing, repeat pending   - S/p Terrence sauer  - Tsh marginally elevated, T3, T4 wnl, likely euthyroid sick syndrome in acute setting   - pending cortisol Found to be hypothermic  unclear etiology: Infectious vs. hypothyroidism vs.  adrenal insufficiency, central. Resolved   - Norm temp,   - Bcx (+) staph epi concerning for contaminant as pt is afebrile and non toxic appearing, repeat pending   - S/p Terrence sauer  -Tsh marginally elevated, T3/T4 wnl, ?subclinical hypothyroidism in acute setting. Repeat tsh in 6-8wk  - pending cortisol

## 2022-05-15 LAB — SARS-COV-2 RNA SPEC QL NAA+PROBE: SIGNIFICANT CHANGE UP

## 2022-05-15 PROCEDURE — 99233 SBSQ HOSP IP/OBS HIGH 50: CPT

## 2022-05-15 RX ADMIN — Medication 1 TABLET(S): at 12:31

## 2022-05-15 RX ADMIN — Medication 0.5 MILLIGRAM(S): at 21:46

## 2022-05-15 RX ADMIN — LACOSAMIDE 200 MILLIGRAM(S): 50 TABLET ORAL at 14:29

## 2022-05-15 RX ADMIN — Medication 750 MILLIGRAM(S): at 13:14

## 2022-05-15 RX ADMIN — LACTULOSE 15 GRAM(S): 10 SOLUTION ORAL at 12:31

## 2022-05-15 RX ADMIN — SENNA PLUS 2 TABLET(S): 8.6 TABLET ORAL at 21:40

## 2022-05-15 RX ADMIN — LACOSAMIDE 200 MILLIGRAM(S): 50 TABLET ORAL at 21:58

## 2022-05-15 RX ADMIN — Medication 750 MILLIGRAM(S): at 05:04

## 2022-05-15 RX ADMIN — LEVETIRACETAM 600 MILLIGRAM(S): 250 TABLET, FILM COATED ORAL at 17:33

## 2022-05-15 RX ADMIN — CEFTRIAXONE 100 MILLIGRAM(S): 500 INJECTION, POWDER, FOR SOLUTION INTRAMUSCULAR; INTRAVENOUS at 14:30

## 2022-05-15 RX ADMIN — Medication 1 APPLICATION(S): at 12:40

## 2022-05-15 RX ADMIN — Medication 300 MILLIGRAM(S): at 12:30

## 2022-05-15 RX ADMIN — ENOXAPARIN SODIUM 40 MILLIGRAM(S): 100 INJECTION SUBCUTANEOUS at 06:00

## 2022-05-15 RX ADMIN — LEVETIRACETAM 600 MILLIGRAM(S): 250 TABLET, FILM COATED ORAL at 05:02

## 2022-05-15 RX ADMIN — Medication 750 MILLIGRAM(S): at 21:41

## 2022-05-15 RX ADMIN — Medication 0.5 MILLIGRAM(S): at 09:21

## 2022-05-15 RX ADMIN — LACOSAMIDE 200 MILLIGRAM(S): 50 TABLET ORAL at 05:05

## 2022-05-15 NOTE — PROVIDER CONTACT NOTE (OTHER) - SITUATION
Patient has a  IV. Multiple attempts were made to replace IV but was unsuccessful due to poor flashback and edema.

## 2022-05-15 NOTE — PROGRESS NOTE ADULT - SUBJECTIVE AND OBJECTIVE BOX
Patient is a 57y old  Female who presents with a chief complaint of vomiting (12 May 2022 11:40)      SUBJECTIVE / OVERNIGHT EVENTS: No events reported, tolerating feeds, (+) staph epi likely contaminant, pending final repeat cx.    ADDITIONAL REVIEW OF SYSTEMS: ROS Limited as patient is non verbal baseline but patient afebrile, appears comfortable, nontoxic appearing      MEDICATIONS  (STANDING):  buDESOnide    Inhalation Suspension 0.5 milliGRAM(s) Inhalation every 12 hours  calcium carbonate    500 mG (Tums) Chewable 1 Tablet(s) Oral daily  collagenase Ointment 1 Application(s) Topical daily  enoxaparin Injectable 40 milliGRAM(s) SubCutaneous every 24 hours  ferrous    sulfate Liquid 300 milliGRAM(s) Oral daily  lacosamide Solution 200 milliGRAM(s) Oral <User Schedule>  lactulose Syrup 15 Gram(s) Enteral Tube daily  levETIRAcetam  Solution 600 milliGRAM(s) Oral two times a day  multivitamin 1 Tablet(s) Oral daily  senna 2 Tablet(s) Oral at bedtime  valproic  acid Syrup 750 milliGRAM(s) Oral three times a day  vancomycin  IVPB 1000 milliGRAM(s) IV Intermittent every 24 hours    MEDICATIONS  (PRN):  ALBUTerol   0.5% 2.5 milliGRAM(s) Nebulizer every 6 hours PRN Shortness of Breath and/or Wheezing      CAPILLARY BLOOD GLUCOSE        I&O's Summary      PHYSICAL EXAM:  Vital Signs Last 24 Hrs  T(C): 37.2 (15 May 2022 05:05), Max: 37.6 (14 May 2022 22:15)  T(F): 99 (15 May 2022 05:05), Max: 99.7 (14 May 2022 22:15)  HR: 70 (15 May 2022 09:21) (70 - 103)  BP: 134/78 (15 May 2022 05:05) (131/64 - 159/87)  BP(mean): --  RR: 18 (15 May 2022 05:05) (18 - 19)  SpO2: 96% (15 May 2022 09:21) (96% - 98%)      CONSTITUTIONAL: Middle age woman  NAD, well-developed, well-groomed  RESPIRATORY: on RA, Normal respiratory effort; lungs are clear to auscultation bilaterally  CARDIOVASCULAR: Regular rate and rhythm, normal S1 and S2, no murmur/rub/gallop; No lower extremity edema; Peripheral pulses are 2+ bilaterally  ABDOMEN: Nontender to palpation, normoactive bowel sounds, no rebound/guarding; No hepatosplenomegaly; PEG site c/D/I, abdominal binder   PSYCH: Non verbal- baseline  NEUROLOGY: Moves extremities spontaneously       LABS:                          12.8   7.91  )-----------( 155      ( 14 May 2022 04:16 )               39.6   05-14    136  |  96<L>  |  14  ----------------------------<  99  4.8   |  33<H>  |  0.45<L>    Ca    8.4      14 May 2022 04:16                          Culture - Blood (collected 11 May 2022 14:00)  Source: .Blood Blood  Gram Stain (13 May 2022 02:02):    Growth in aerobic bottle: Gram Positive Cocci in Clusters  Preliminary Report (13 May 2022 02:03):    Growth in aerobic bottle: Gram Positive Cocci in Clusters    ***Blood Panel PCR results on this specimen are available    approximately 3 hours after the Gram stain result.***    Gram stain, PCR, and/or culture results may not always    correspond due to difference in methodologies.    ************************************************************    This PCR assay was performed by multiplex PCR. This    Assay tests for 66 bacterial and resistance gene targets.    Please refer to the [facility] test directory    at [email or web address] for details.  Organism: Blood Culture PCR (13 May 2022 03:38)  Organism: Blood Culture PCR (13 May 2022 03:38)    Culture - Urine (collected 10 May 2022 18:38)  Source: Clean Catch Clean Catch (Midstream)  Final Report (11 May 2022 17:47):    <10,000 CFU/mL Normal Urogenital Charlette        RADIOLOGY & ADDITIONAL TESTS:  Results Reviewed:   Imaging Personally Reviewed:  Electrocardiogram Personally Reviewed:    COORDINATION OF CARE:  Care Discussed with Consultants/Other Providers [Y/N]:  Prior or Outpatient Records Reviewed [Y/N]:

## 2022-05-15 NOTE — PROGRESS NOTE ADULT - PROBLEM SELECTOR PLAN 4
Resolved: Found to be hypothermic  unclear etiology: Infectious vs. hypothyroidism vs.  adrenal insufficiency, central.   - Bcx (+) staph epi likely contaminant, pt continues to be afebrile, nontoxic appearing, VSS    - Repeat blood culture sent on 5/13 prelim result neg, final result pending  - Tsh marginally elevated, T3/T4 wnl, ?subclinical hypothyroidism in acute setting. Repeat tsh in 6-8wk  - pending cortisol

## 2022-05-16 VITALS
SYSTOLIC BLOOD PRESSURE: 103 MMHG | OXYGEN SATURATION: 96 % | DIASTOLIC BLOOD PRESSURE: 75 MMHG | HEART RATE: 100 BPM | RESPIRATION RATE: 19 BRPM

## 2022-05-16 PROCEDURE — 99239 HOSP IP/OBS DSCHRG MGMT >30: CPT

## 2022-05-16 RX ADMIN — Medication 750 MILLIGRAM(S): at 06:51

## 2022-05-16 RX ADMIN — Medication 1 TABLET(S): at 11:27

## 2022-05-16 RX ADMIN — LACTULOSE 15 GRAM(S): 10 SOLUTION ORAL at 11:27

## 2022-05-16 RX ADMIN — Medication 1 TABLET(S): at 11:26

## 2022-05-16 RX ADMIN — Medication 1 APPLICATION(S): at 11:28

## 2022-05-16 RX ADMIN — Medication 0.5 MILLIGRAM(S): at 09:17

## 2022-05-16 RX ADMIN — LEVETIRACETAM 600 MILLIGRAM(S): 250 TABLET, FILM COATED ORAL at 06:48

## 2022-05-16 RX ADMIN — LACOSAMIDE 200 MILLIGRAM(S): 50 TABLET ORAL at 07:09

## 2022-05-16 RX ADMIN — Medication 300 MILLIGRAM(S): at 11:27

## 2022-05-16 RX ADMIN — ENOXAPARIN SODIUM 40 MILLIGRAM(S): 100 INJECTION SUBCUTANEOUS at 06:51

## 2022-05-16 NOTE — PROGRESS NOTE ADULT - PROBLEM SELECTOR PROBLEM 1
Nausea & vomiting
Positive blood culture

## 2022-05-16 NOTE — PROGRESS NOTE ADULT - PROBLEM SELECTOR PROBLEM 3
Hypothermia, endogenous
Hypothermia, endogenous
Nausea & vomiting
Hypothermia, endogenous

## 2022-05-16 NOTE — PROGRESS NOTE ADULT - PROBLEM SELECTOR PROBLEM 8
Feeding by G-tube
DVT prophylaxis
Feeding by G-tube
Feeding by G-tube

## 2022-05-16 NOTE — PROGRESS NOTE ADULT - PROBLEM SELECTOR PLAN 5
cont vimpat 200mg TID  cont valproic acid 750mg TID  con keppra 600mg BID
cont albuterol, budesonide
cont albuterol, budesonide
cont vimpat 200mg TID  cont valproic acid 750mg TID  con keppra 600mg BID
cont albuterol, budesonide
cont vimpat 200mg TID  cont valproic acid 750mg TID  con keppra 600mg BID

## 2022-05-16 NOTE — PROGRESS NOTE ADULT - PROBLEM SELECTOR PLAN 2
Improved: WBC 12.36 intially, now 7.91   Afebrile, VSS  Patient presented with hypoxia from the group home.   Patient had one episode of pulse of 94 initially, then patient has been 100% on RA.   CXR and CT chest: no signs of PNA  s/p vanco/zosyn  UA neg, UCx- norm allison   Aspiration precautions  Podiatry
Improved: initial WBC 12.36, decreased to 7.91   Afebrile, VSS  CXR and CT chest: no signs of PNA  s/p vanco/zosyn  UA neg, UCx- norm allison   Aspiration precautions  Podiatry
WBC: 12.36  Patient presented with hypoxia from the group home. Patient had one episode of pulse of 94 initially, then patient has been 100% on RA.   CXR and CT chest: no signs of PNA  s/p vanco/zosyn  UA neg, Follow UC  Monitor for aspiration closely  aspiration precautions
WBC: 12.36  Patient presented with hypoxia from the group home. Patient had one episode of pulse of 94 initially, then patient has been 100% on RA.   CXR and CT chest: no signs of PNA  s/p vanco/zosyn  UA neg, Follow UC  Monitor for aspiration closely  aspiration precautions  Podiatry
Improved: initial WBC 12.36, decreased to 7.91   Afebrile, VSS  CXR and CT chest: no signs of PNA  s/p vanco/zosyn  UA neg, UCx- norm allison   Aspiration precautions  Podiatry
WBC: 12.36  Patient presented with hypoxia from the group home. Patient had one episode of pulse of 94 initially, then patient has been 100% on RA.   CXR and CT chest: no signs of PNA  s/p vanco/zosyn  UA neg, Follow UC  Monitor for aspiration closely  aspiration precautions  Podiatry

## 2022-05-16 NOTE — PROGRESS NOTE ADULT - PROBLEM SELECTOR PLAN 9
Lovenox 40 SC for VTE px  Dispo back to facility given  clearance of blood culture 35 minutes spent discharge planning.
Lovenox 40 SC for VTE px  Dispo back to facility pending clearance of blood culture
Lovenox 40 SC for VTE px  Dispo back to facility pending clearance of blood culture

## 2022-05-16 NOTE — PROGRESS NOTE ADULT - PROBLEM SELECTOR PLAN 1
- pt with positive blood cultures - staph epi concerning for contaminant as pt is afebrile and non toxic appearing   - s/p vanc, narrowed to ceftriaxone ( started 5/13)   - F/u Repeat blood culture sent on 5/13   - IF repeat blood cultures negative and patient clinically stable, can dc abx and discharge patient
- Bcx (+) staph epi likely contaminant, pt continues to be afebrile, nontoxic appearing, VSS    - s/p vanc, narrowed to ceftriaxone ( started 5/13) dc'ed on 5/16   - Repeat blood culture sent on 5/13 prelim result neg, final result negative   - If repeat blood cultures negative and patient clinically stable, can dc abx and discharge patient
No episodes of vomiting currently  CT abdomen: No bowel obstruction or evidence of acute bowel inflammation.   Percutaneous gastrostomy tube within the stomach.  will restart peg feeds for now - able to tolerate feeds   Monitor for further episodes
No episodes of vomiting currently  CT abdomen: No bowel obstruction or evidence of acute bowel inflammation.   Percutaneous gastrostomy tube within the stomach.  will restart peg feeds for now  Monitor for further episodes
- Bcx (+) staph epi likely contaminant, pt continues to be afebrile, nontoxic appearing, VSS    - s/p vanc, narrowed to ceftriaxone ( started 5/13)   - Repeat blood culture sent on 5/13 prelim result neg, final result pending  - If repeat blood cultures negative and patient clinically stable, can dc abx and discharge patient
No episodes of vomiting currently  CT abdomen: No bowel obstruction or evidence of acute bowel inflammation.   Percutaneous gastrostomy tube within the stomach.  will restart peg feeds for now -  pt able to tolerate feeds without issue   Monitor for further episodes

## 2022-05-16 NOTE — PROGRESS NOTE ADULT - SUBJECTIVE AND OBJECTIVE BOX
[facility]  Division of Hospital Medicine  Jill Cummings MD  Pager: [phone]      Patient is a 57y old  Female who presents with a chief complaint of vomiting (15 May 2022 12:19)      SUBJECTIVE / OVERNIGHT EVENTS: Patient repeat blood culture negative. REassurring that initial positive was likely contaminant. Abx dc. Pt non verbal. Appeared comfortable.   ADDITIONAL REVIEW OF SYSTEMS:    MEDICATIONS  (STANDING):  buDESOnide    Inhalation Suspension 0.5 milliGRAM(s) Inhalation every 12 hours  calcium carbonate    500 mG (Tums) Chewable 1 Tablet(s) Oral daily  collagenase Ointment 1 Application(s) Topical daily  enoxaparin Injectable 40 milliGRAM(s) SubCutaneous every 24 hours  ferrous    sulfate Liquid 300 milliGRAM(s) Oral daily  lacosamide Solution 200 milliGRAM(s) Oral <User Schedule>  lactulose Syrup 15 Gram(s) Enteral Tube daily  levETIRAcetam  Solution 600 milliGRAM(s) Oral two times a day  multivitamin 1 Tablet(s) Oral daily  senna 2 Tablet(s) Oral at bedtime  valproic  acid Syrup 750 milliGRAM(s) Oral three times a day  Viokace 2 Tablet(s) 2 Tablet(s) Oral/Enteral Tube once    MEDICATIONS  (PRN):  ALBUTerol   0.5% 2.5 milliGRAM(s) Nebulizer every 6 hours PRN Shortness of Breath and/or Wheezing      CAPILLARY BLOOD GLUCOSE        I&O's Summary    15 May 2022 07:01  -  16 May 2022 07:00  --------------------------------------------------------  IN: 650 mL / OUT: 0 mL / NET: 650 mL        PHYSICAL EXAM:  Vital Signs Last 24 Hrs  T(C): 36.4 (16 May 2022 05:39), Max: 37.2 (15 May 2022 13:07)  T(F): 97.6 (16 May 2022 05:39), Max: 99 (15 May 2022 13:07)  HR: 87 (16 May 2022 09:35) (87 - 105)  BP: 135/82 (16 May 2022 05:39) (135/82 - 142/74)  BP(mean): --  RR: 19 (16 May 2022 05:39) (17 - 19)  SpO2: 98% (16 May 2022 09:35) (92% - 98%)  CONSTITUTIONAL: Middle age woman  NAD, well-developed, well-groomed  RESPIRATORY: on RA, Normal respiratory effort; lungs are clear to auscultation bilaterally  CARDIOVASCULAR: Regular rate and rhythm, normal S1 and S2, no murmur/rub/gallop; No lower extremity edema; Peripheral pulses are 2+ bilaterally  ABDOMEN: Nontender to palpation, normoactive bowel sounds, no rebound/guarding; No hepatosplenomegaly; PEG site c/D/I, abdominal binder   PSYCH: Non verbal- baseline  NEUROLOGY: Moves extremities spontaneously     LABS:                    Culture - Blood (collected 13 May 2022 16:16)  Source: .Blood Blood-Peripheral  Preliminary Report (14 May 2022 22:01):    No growth to date.    Culture - Blood (collected 13 May 2022 16:00)  Source: .Blood Blood-Peripheral  Preliminary Report (14 May 2022 22:01):    No growth to date.        RADIOLOGY & ADDITIONAL TESTS:  Results Reviewed:   Imaging Personally Reviewed:  Electrocardiogram Personally Reviewed:    COORDINATION OF CARE:  Care Discussed with Consultants/Other Providers [Y/N]:  Prior or Outpatient Records Reviewed [Y/N]:

## 2022-05-16 NOTE — PROGRESS NOTE ADULT - ASSESSMENT
57F w/ pmhx of cerebral palsy (non-verbal, moans, bedbound at baseline), profound intellectual disability, seizure d/o, constipation, multiple admissions for aspiration PNA, (Pseudomonas & MRSA in sputum) requiring intubation, dysphagia s/p PEG w/ 2 recent admissions for issues related with aspiration presents with episodes of vomiting and hypoxia at [facility].
57F w/ pmhx of cerebral palsy (non-verbal, moans, bedbound at baseline), profound intellectual disability, seizure d/o, constipation, multiple admissions for aspiration PNA, (Pseudomonas & MRSA in sputum) requiring intubation, dysphagia s/p PEG w/ 2 recent admissions for issues related with aspiration presents with episodes of vomiting and hypoxia at [facility]. Patient with no evidence of hypoxia. Pt course complicated by positive blood cultures - staph epi, pending repeat bcx     
57F w/ pmhx of cerebral palsy (non-verbal, moans, bedbound at baseline), profound intellectual disability, seizure d/o, constipation, multiple admissions for aspiration PNA, (Pseudomonas & MRSA in sputum) requiring intubation, dysphagia s/p PEG w/ 2 recent admissions for issues related with aspiration presents with episodes of vomiting and hypoxia at [facility]. Patient with no evidence of hypoxia. Pt course complicated by positive blood cultures - staph epi    #Positive blood culture - pt with positive blood cultures - staph epi concerning for contaminant as pt is afebrile and non toxic appearing   - s/p vanc overnight - will narrow to ceftriaxone (5/13   - F.u Repeat blood culture send on 5/13   - IF repeat blood cultures negative and patient clinically stable, can dc abx and  discharge patient 
57F w/ pmhx of cerebral palsy (non-verbal, moans, bedbound at baseline), profound intellectual disability, seizure d/o, constipation, multiple admissions for aspiration PNA, (Pseudomonas & MRSA in sputum) requiring intubation, dysphagia s/p PEG w/ 2 recent admissions for issues related with aspiration presents with episodes of vomiting and hypoxia at [facility]. Patient with no evidence of hypoxia. Pt course complicated by positive blood cultures - staph epi, pending repeat bcx     
57F w/ pmhx of cerebral palsy (non-verbal, moans, bedbound at baseline), profound intellectual disability, seizure d/o, constipation, multiple admissions for aspiration PNA, (Pseudomonas & MRSA in sputum) requiring intubation, dysphagia s/p PEG w/ 2 recent admissions for issues related with aspiration presents with episodes of vomiting and hypoxia at [facility]. Patient with no evidence of hypoxia. 
57F w/ pmhx of cerebral palsy (non-verbal, moans, bedbound at baseline), profound intellectual disability, seizure d/o, constipation, multiple admissions for aspiration PNA, (Pseudomonas & MRSA in sputum) requiring intubation, dysphagia s/p PEG w/ 2 recent admissions for issues related with aspiration presents with episodes of vomiting and hypoxia at [facility]. Patient with no evidence of hypoxia. Pt course complicated by positive blood cultures - staph epi, pending repeat bcx negative

## 2022-05-16 NOTE — PROGRESS NOTE ADULT - PROBLEM SELECTOR PLAN 4
Resolved: Found to be hypothermic  unclear etiology: Infectious vs. hypothyroidism vs.  adrenal insufficiency, central.   - Bcx (+) staph epi likely contaminant, pt continues to be afebrile, nontoxic appearing, VSS    - Repeat blood culture sent on 5/13 prelim result neg, final result negative   - Tsh marginally elevated, T3/T4 wnl, ?subclinical hypothyroidism in acute setting. Repeat tsh in 6-8wk  - pending cortisol

## 2022-05-16 NOTE — PROGRESS NOTE ADULT - PROBLEM SELECTOR PROBLEM 4
Hypothermia, endogenous
Seizure disorder
Hypothermia, endogenous
Seizure disorder
Seizure disorder
Hypothermia, endogenous

## 2022-05-16 NOTE — PROGRESS NOTE ADULT - PROBLEM SELECTOR PLAN 6
cont senna   Can consider Milk of magnesia or fleet enema if there is no BM
cont albuterol, budesonide
cont senna   Can consider Milk of magnesia or fleet enema if there is no BM
cont senna   Can consider Milk of magnesia or fleet enema if there is no BM

## 2022-05-16 NOTE — PROGRESS NOTE ADULT - PROBLEM SELECTOR PLAN 8
Lovenox 40 SC for VTE px  Dispo back to facility 35 minutes spent discharge planning.
Patient receives Jevity via G-tube  Per charts: patient receives 50mL/hr for 18hours [900mL total] and flush G-tube with 60mL before and after the feed. Flush with 25mL/hr for 18 hours for a total of 450mL  c/w tube feed
Patient receives Jevity via G-tube  Per charts: patient receives 50mL/hr for 18hours [900mL total] and flush G-tube with 60mL before and after the feed. Flush with 25mL/hr for 18 hours for a total of 450mL  Restart feeds  Nutrition eval
Patient receives Jevity via G-tube  Per charts: patient receives 50mL/hr for 18hours [900mL total] and flush G-tube with 60mL before and after the feed. Flush with 25mL/hr for 18 hours for a total of 450mL  c/w tube feed
Lovenox 40 SC for VTE px
Lovenox 40 SC for VTE px  Dispo back to facility pending clearance of blood culture  Updated mother on phone on 5/12

## 2022-05-16 NOTE — PROGRESS NOTE ADULT - PROBLEM SELECTOR PLAN 7
Patient receives Jevity via G-tube  Per charts: patient receives 50mL/hr for 18hours [900mL total] and flush G-tube with 60mL before and after the feed. Flush with 25mL/hr for 18 hours for a total of 450mL  Restart feeds  Nutrition eval
cont senna   Can consider Milk of magnesia or fleet enema if there is no BM

## 2022-06-22 ENCOUNTER — EMERGENCY (EMERGENCY)
Facility: HOSPITAL | Age: 57
LOS: 1 days | Discharge: HOME | End: 2022-06-22
Attending: EMERGENCY MEDICINE | Admitting: EMERGENCY MEDICINE
Payer: MEDICARE

## 2022-06-22 VITALS
HEART RATE: 84 BPM | TEMPERATURE: 98 F | OXYGEN SATURATION: 96 % | SYSTOLIC BLOOD PRESSURE: 122 MMHG | DIASTOLIC BLOOD PRESSURE: 74 MMHG | RESPIRATION RATE: 18 BRPM | HEIGHT: 63 IN

## 2022-06-22 DIAGNOSIS — Z93.1 GASTROSTOMY STATUS: Chronic | ICD-10-CM

## 2022-06-22 PROCEDURE — 93010 ELECTROCARDIOGRAM REPORT: CPT

## 2022-06-22 PROCEDURE — 99285 EMERGENCY DEPT VISIT HI MDM: CPT | Mod: FS,25

## 2022-06-22 NOTE — ED ADULT TRIAGE NOTE - CHIEF COMPLAINT QUOTE
Pt brought in by EMS from group home, bed bound for wheezing and coughing up blood. Pt has hx of CP. Pt non verbal

## 2022-06-23 VITALS
RESPIRATION RATE: 18 BRPM | HEART RATE: 72 BPM | SYSTOLIC BLOOD PRESSURE: 104 MMHG | OXYGEN SATURATION: 95 % | DIASTOLIC BLOOD PRESSURE: 64 MMHG

## 2022-06-23 LAB
ALBUMIN SERPL ELPH-MCNC: 3 G/DL — LOW (ref 3.3–5)
ALP SERPL-CCNC: 85 U/L — SIGNIFICANT CHANGE UP (ref 40–120)
ALT FLD-CCNC: 20 U/L — SIGNIFICANT CHANGE UP (ref 4–33)
ANION GAP SERPL CALC-SCNC: 10 MMOL/L — SIGNIFICANT CHANGE UP (ref 7–14)
APPEARANCE UR: CLEAR — SIGNIFICANT CHANGE UP
AST SERPL-CCNC: 47 U/L — HIGH (ref 4–32)
B PERT DNA SPEC QL NAA+PROBE: SIGNIFICANT CHANGE UP
B PERT+PARAPERT DNA PNL SPEC NAA+PROBE: SIGNIFICANT CHANGE UP
BASOPHILS # BLD AUTO: 0.02 K/UL — SIGNIFICANT CHANGE UP (ref 0–0.2)
BASOPHILS NFR BLD AUTO: 0.3 % — SIGNIFICANT CHANGE UP (ref 0–2)
BILIRUB SERPL-MCNC: 0.4 MG/DL — SIGNIFICANT CHANGE UP (ref 0.2–1.2)
BILIRUB UR-MCNC: NEGATIVE — SIGNIFICANT CHANGE UP
BLOOD GAS VENOUS COMPREHENSIVE RESULT: SIGNIFICANT CHANGE UP
BORDETELLA PARAPERTUSSIS (RAPRVP): SIGNIFICANT CHANGE UP
BUN SERPL-MCNC: 20 MG/DL — SIGNIFICANT CHANGE UP (ref 7–23)
C PNEUM DNA SPEC QL NAA+PROBE: SIGNIFICANT CHANGE UP
CALCIUM SERPL-MCNC: 9 MG/DL — SIGNIFICANT CHANGE UP (ref 8.4–10.5)
CHLORIDE SERPL-SCNC: 97 MMOL/L — LOW (ref 98–107)
CO2 SERPL-SCNC: 29 MMOL/L — SIGNIFICANT CHANGE UP (ref 22–31)
COLOR SPEC: YELLOW — SIGNIFICANT CHANGE UP
CREAT SERPL-MCNC: 0.48 MG/DL — LOW (ref 0.5–1.3)
D DIMER BLD IA.RAPID-MCNC: 563 NG/ML DDU — HIGH
DIFF PNL FLD: NEGATIVE — SIGNIFICANT CHANGE UP
EGFR: 111 ML/MIN/1.73M2 — SIGNIFICANT CHANGE UP
EOSINOPHIL # BLD AUTO: 0.37 K/UL — SIGNIFICANT CHANGE UP (ref 0–0.5)
EOSINOPHIL NFR BLD AUTO: 5.9 % — SIGNIFICANT CHANGE UP (ref 0–6)
FLUAV SUBTYP SPEC NAA+PROBE: SIGNIFICANT CHANGE UP
FLUBV RNA SPEC QL NAA+PROBE: SIGNIFICANT CHANGE UP
GLUCOSE SERPL-MCNC: 77 MG/DL — SIGNIFICANT CHANGE UP (ref 70–99)
GLUCOSE UR QL: NEGATIVE — SIGNIFICANT CHANGE UP
HADV DNA SPEC QL NAA+PROBE: SIGNIFICANT CHANGE UP
HCOV 229E RNA SPEC QL NAA+PROBE: SIGNIFICANT CHANGE UP
HCOV HKU1 RNA SPEC QL NAA+PROBE: SIGNIFICANT CHANGE UP
HCOV NL63 RNA SPEC QL NAA+PROBE: SIGNIFICANT CHANGE UP
HCOV OC43 RNA SPEC QL NAA+PROBE: SIGNIFICANT CHANGE UP
HCT VFR BLD CALC: 38.6 % — SIGNIFICANT CHANGE UP (ref 34.5–45)
HGB BLD-MCNC: 12.6 G/DL — SIGNIFICANT CHANGE UP (ref 11.5–15.5)
HMPV RNA SPEC QL NAA+PROBE: SIGNIFICANT CHANGE UP
HPIV1 RNA SPEC QL NAA+PROBE: SIGNIFICANT CHANGE UP
HPIV2 RNA SPEC QL NAA+PROBE: SIGNIFICANT CHANGE UP
HPIV3 RNA SPEC QL NAA+PROBE: SIGNIFICANT CHANGE UP
HPIV4 RNA SPEC QL NAA+PROBE: SIGNIFICANT CHANGE UP
IANC: 3.44 K/UL — SIGNIFICANT CHANGE UP (ref 1.8–7.4)
IMM GRANULOCYTES NFR BLD AUTO: 0.3 % — SIGNIFICANT CHANGE UP (ref 0–1.5)
KETONES UR-MCNC: NEGATIVE — SIGNIFICANT CHANGE UP
LEUKOCYTE ESTERASE UR-ACNC: NEGATIVE — SIGNIFICANT CHANGE UP
LIDOCAIN IGE QN: 46 U/L — SIGNIFICANT CHANGE UP (ref 7–60)
LYMPHOCYTES # BLD AUTO: 1.77 K/UL — SIGNIFICANT CHANGE UP (ref 1–3.3)
LYMPHOCYTES # BLD AUTO: 28.1 % — SIGNIFICANT CHANGE UP (ref 13–44)
M PNEUMO DNA SPEC QL NAA+PROBE: SIGNIFICANT CHANGE UP
MCHC RBC-ENTMCNC: 32.6 GM/DL — SIGNIFICANT CHANGE UP (ref 32–36)
MCHC RBC-ENTMCNC: 34.7 PG — HIGH (ref 27–34)
MCV RBC AUTO: 106.3 FL — HIGH (ref 80–100)
MONOCYTES # BLD AUTO: 0.69 K/UL — SIGNIFICANT CHANGE UP (ref 0–0.9)
MONOCYTES NFR BLD AUTO: 10.9 % — SIGNIFICANT CHANGE UP (ref 2–14)
NEUTROPHILS # BLD AUTO: 3.44 K/UL — SIGNIFICANT CHANGE UP (ref 1.8–7.4)
NEUTROPHILS NFR BLD AUTO: 54.5 % — SIGNIFICANT CHANGE UP (ref 43–77)
NITRITE UR-MCNC: NEGATIVE — SIGNIFICANT CHANGE UP
NRBC # BLD: 0 /100 WBCS — SIGNIFICANT CHANGE UP
NRBC # FLD: 0 K/UL — SIGNIFICANT CHANGE UP
NT-PROBNP SERPL-SCNC: 166 PG/ML — SIGNIFICANT CHANGE UP
PH UR: 7 — SIGNIFICANT CHANGE UP (ref 5–8)
PLATELET # BLD AUTO: 166 K/UL — SIGNIFICANT CHANGE UP (ref 150–400)
POTASSIUM SERPL-MCNC: 4.7 MMOL/L — SIGNIFICANT CHANGE UP (ref 3.5–5.3)
POTASSIUM SERPL-SCNC: 4.7 MMOL/L — SIGNIFICANT CHANGE UP (ref 3.5–5.3)
PROT SERPL-MCNC: 7.2 G/DL — SIGNIFICANT CHANGE UP (ref 6–8.3)
PROT UR-MCNC: NEGATIVE — SIGNIFICANT CHANGE UP
RAPID RVP RESULT: SIGNIFICANT CHANGE UP
RBC # BLD: 3.63 M/UL — LOW (ref 3.8–5.2)
RBC # FLD: 15.4 % — HIGH (ref 10.3–14.5)
RSV RNA SPEC QL NAA+PROBE: SIGNIFICANT CHANGE UP
RV+EV RNA SPEC QL NAA+PROBE: SIGNIFICANT CHANGE UP
SARS-COV-2 RNA SPEC QL NAA+PROBE: SIGNIFICANT CHANGE UP
SODIUM SERPL-SCNC: 136 MMOL/L — SIGNIFICANT CHANGE UP (ref 135–145)
SP GR SPEC: 1.02 — SIGNIFICANT CHANGE UP (ref 1–1.05)
TROPONIN T, HIGH SENSITIVITY RESULT: 18 NG/L — SIGNIFICANT CHANGE UP
UROBILINOGEN FLD QL: SIGNIFICANT CHANGE UP
WBC # BLD: 6.31 K/UL — SIGNIFICANT CHANGE UP (ref 3.8–10.5)
WBC # FLD AUTO: 6.31 K/UL — SIGNIFICANT CHANGE UP (ref 3.8–10.5)

## 2022-06-23 PROCEDURE — 71045 X-RAY EXAM CHEST 1 VIEW: CPT | Mod: 26

## 2022-06-23 PROCEDURE — 71275 CT ANGIOGRAPHY CHEST: CPT | Mod: 26,MA

## 2022-06-23 RX ORDER — HALOPERIDOL DECANOATE 100 MG/ML
2.5 INJECTION INTRAMUSCULAR ONCE
Refills: 0 | Status: COMPLETED | OUTPATIENT
Start: 2022-06-23 | End: 2022-06-23

## 2022-06-23 RX ADMIN — HALOPERIDOL DECANOATE 2.5 MILLIGRAM(S): 100 INJECTION INTRAMUSCULAR at 01:23

## 2022-06-23 RX ADMIN — Medication 2 MILLIGRAM(S): at 02:59

## 2022-06-23 RX ADMIN — Medication 1 MILLIGRAM(S): at 02:06

## 2022-06-23 RX ADMIN — HALOPERIDOL DECANOATE 2.5 MILLIGRAM(S): 100 INJECTION INTRAMUSCULAR at 03:00

## 2022-06-23 RX ADMIN — HALOPERIDOL DECANOATE 2.5 MILLIGRAM(S): 100 INJECTION INTRAMUSCULAR at 02:06

## 2022-06-23 NOTE — ED ADULT NURSE NOTE - OBJECTIVE STATEMENT
Patient received to room 3, HX CP and developmental disabilities, bedbound at baseline, brought in from group home for hemoptysis. Per group home, pt. had one episode at approx 5pm, and was found to be hypoxic to 87%. Pt. currently 95% on room air, respirations even and unlabored. Nonproductive cough noted.

## 2022-06-23 NOTE — ED ADULT NURSE REASSESSMENT NOTE - NS ED NURSE REASSESS COMMENT FT1
Patient medicated as per orders. Unable to obtain IV access, ultrasound guided IV attempted and unsuccessful. 20G IV placed to R foot, okay to use as per MD Fields. Pt. straight cathed for urine, approx 150cc clear yellow urine. Cleaned, turned, repositioned for comfort. 95% on room air, respirations even and unlabored.

## 2022-06-23 NOTE — ED ADULT NURSE NOTE - CCCP TRG CHIEF CMPLNT
Patient dropped off foster care forms for completion. If possible, please fax to [phone] at [phone] or call patient when ready for . Form placed in nurse basket. coughing

## 2022-06-23 NOTE — ED PROVIDER NOTE - NS ED ATTENDING STATEMENT MOD
Attending with I have seen and examined this patient and fully participated in the care of this patient as the teaching attending.  The service was shared with the ANDREW.  I reviewed and verified the documentation and independently performed the documented:

## 2022-06-23 NOTE — ED ADULT NURSE REASSESSMENT NOTE - NS ED NURSE REASSESS COMMENT FT1
pt with no fever no coughing noted o2 sat 100 room air.  pt repositioned in bed and changed for her incontinence. Pt d/cd back to facility awaiting .

## 2022-06-23 NOTE — ED PROVIDER NOTE - ATTENDING CONTRIBUTION TO CARE
Patient is a 56 yo F with history of cerebral palsy, intellectual disability, asthma, seizure disorder, non-verbal here for evaluation of spitting of small spots of blood and episode of hypoxia to 87%. Patient cannot provide any history and is nonverbal. Patient is from [place], [employer]. Per RN Stephie, [phone], patient was noted to have small blood in the sputum around 5 pm and then checked oxygen level was 87%. Patient has had multiple episodes of aspiration pneumonia. No known fevers. Patient is not oxygen dependent at baseline.     VS noted  Gen. no acute distress, Non toxic   HEENT: EOMI, mmm  Lungs: CTAB/L no C/ W /R   CVS: RRR   Abd; Soft non tender, non distended, PEG tube in place  Ext: no edema  Skin: no rash  Neuro: awake, cerebral palsy, moves UE, non-verbal  a/p: cough with blood - eval for aspiration pneumonia vs PE. Plan for labs, cultures, CTA to eval for PE vs pneumonia, RVP.   - Donnie PAYAN

## 2022-06-23 NOTE — PROVIDER CONTACT NOTE (OTHER) - BACKGROUND
SW contacted by MD Harden advising that pt is cleared and able to return to their previous, [facility] ([phone] [place] [id number]).

## 2022-06-23 NOTE — ED PROVIDER NOTE - CLINICAL SUMMARY MEDICAL DECISION MAKING FREE TEXT BOX
this is a 57 y.o female with a PMhx of cerebral palsy(non-verbal, bedbound at baseline), profound intellectual disability seizures d/o, constipation, multiple admission for aspiration PNA(pseudomonas and MRSA in sputum) typically requiring intubation, dysphagia s/p peg w/ 2 recent admission for aspiration, presented to the ED complaining of having hemoptysis earlier today,- Hemoptysis labs, ekg, trop chest xray, ct  reassess

## 2022-06-23 NOTE — ED PROVIDER NOTE - PATIENT PORTAL LINK FT
You can access the FollowMyHealth Patient Portal offered by [facility] by registering at the following website: [email or web address]. By joining [employer]’s FollowMyHealth portal, you will also be able to view your health information using other applications (apps) compatible with our system.

## 2022-06-23 NOTE — ED PROVIDER NOTE - NSFOLLOWUPINSTRUCTIONS_ED_ALL_ED_FT
General instructions      • Do not use any products that contain nicotine or tobacco, such as cigarettes, e-cigarettes, and chewing tobacco. If you need help quitting, ask your doctor.  •Return to your normal activities as told by your doctor. Ask your doctor what activities are safe for you.  •Ask your doctor if it is okay for you to exercise or go on an airplane.  •Keep all follow-up visits as told by your doctor. This is important    Contact a doctor if:  •You have a fever over 100.4°F (38°C).  •You cough up more blood than before.  •The blood looks brighter or darker red.    Get help right away if:    •You cough up fresh blood or blood clots.  •You cough up 1–2 cups (240–480 mL) in 24 hours.  •You have trouble breathing.  •You feel like you are choking.  •You have chest pain.  •You feel dizzy or light-headed.

## 2022-06-23 NOTE — ED PROVIDER NOTE - PROGRESS NOTE DETAILS
Dianna THAPA PGY2: PE with no acute intrapulmonary findings, patient has not had an episode of hemoptysis while here. Saturating high 90s on RA. Will dc back to .

## 2022-06-23 NOTE — ED PROVIDER NOTE - OBJECTIVE STATEMENT
this is a 57 y.o female with a PMhx of cerebral palsy(non-verbal, bedbound at baseline), profound intellectual disability seizures d/o, constipation, multiple admission for aspiration PNA(pseudomonas and MRSA in sputum) typically requiring intubation, dysphagia s/p peg w/ 2 recent admission for aspiration, presented to the ED complaining of having hemoptysis earlier today, as per group home patient had a episode around 5pm, did not have any after that, states that it was streaked with blood, she was also found to be hypoxic to 87 percent, patient has a peg tube placed as well. Patient currently does not look tachypneic, O2 at 98%

## 2022-06-24 ENCOUNTER — TRANSCRIPTION ENCOUNTER (OUTPATIENT)
Age: 57
End: 2022-06-24

## 2022-06-24 ENCOUNTER — INPATIENT (INPATIENT)
Facility: HOSPITAL | Age: 57
LOS: 2 days | Discharge: HOME | End: 2022-06-27
Attending: HOSPITALIST | Admitting: HOSPITALIST
Payer: MEDICARE

## 2022-06-24 VITALS
SYSTOLIC BLOOD PRESSURE: 129 MMHG | RESPIRATION RATE: 18 BRPM | DIASTOLIC BLOOD PRESSURE: 104 MMHG | HEART RATE: 88 BPM | OXYGEN SATURATION: 98 % | HEIGHT: 63 IN

## 2022-06-24 DIAGNOSIS — Z29.9 ENCOUNTER FOR PROPHYLACTIC MEASURES, UNSPECIFIED: ICD-10-CM

## 2022-06-24 DIAGNOSIS — R78.81 BACTEREMIA: ICD-10-CM

## 2022-06-24 DIAGNOSIS — Z87.09 PERSONAL HISTORY OF OTHER DISEASES OF THE RESPIRATORY SYSTEM: ICD-10-CM

## 2022-06-24 DIAGNOSIS — Z87.898 PERSONAL HISTORY OF OTHER SPECIFIED CONDITIONS: ICD-10-CM

## 2022-06-24 DIAGNOSIS — Z93.1 GASTROSTOMY STATUS: Chronic | ICD-10-CM

## 2022-06-24 DIAGNOSIS — Z78.9 OTHER SPECIFIED HEALTH STATUS: ICD-10-CM

## 2022-06-24 DIAGNOSIS — R56.9 UNSPECIFIED CONVULSIONS: ICD-10-CM

## 2022-06-24 LAB
-  STAPHYLOCOCCUS EPIDERMIDIS, METHICILLIN RESISTANT: SIGNIFICANT CHANGE UP
ALBUMIN SERPL ELPH-MCNC: 3.3 G/DL — SIGNIFICANT CHANGE UP (ref 3.3–5)
ALP SERPL-CCNC: 116 U/L — SIGNIFICANT CHANGE UP (ref 40–120)
ALT FLD-CCNC: 25 U/L — SIGNIFICANT CHANGE UP (ref 4–33)
ANION GAP SERPL CALC-SCNC: 11 MMOL/L — SIGNIFICANT CHANGE UP (ref 7–14)
APPEARANCE UR: ABNORMAL
APTT BLD: 36.3 SEC — SIGNIFICANT CHANGE UP (ref 27–36.3)
AST SERPL-CCNC: 45 U/L — HIGH (ref 4–32)
B PERT DNA SPEC QL NAA+PROBE: SIGNIFICANT CHANGE UP
B PERT+PARAPERT DNA PNL SPEC NAA+PROBE: SIGNIFICANT CHANGE UP
BASE EXCESS BLDV CALC-SCNC: 9.3 MMOL/L — HIGH (ref -2–3)
BASOPHILS # BLD AUTO: 0.01 K/UL — SIGNIFICANT CHANGE UP (ref 0–0.2)
BASOPHILS NFR BLD AUTO: 0.2 % — SIGNIFICANT CHANGE UP (ref 0–2)
BILIRUB SERPL-MCNC: 0.3 MG/DL — SIGNIFICANT CHANGE UP (ref 0.2–1.2)
BILIRUB UR-MCNC: NEGATIVE — SIGNIFICANT CHANGE UP
BLOOD GAS VENOUS COMPREHENSIVE RESULT: SIGNIFICANT CHANGE UP
BORDETELLA PARAPERTUSSIS (RAPRVP): SIGNIFICANT CHANGE UP
BUN SERPL-MCNC: 21 MG/DL — SIGNIFICANT CHANGE UP (ref 7–23)
C PNEUM DNA SPEC QL NAA+PROBE: SIGNIFICANT CHANGE UP
CALCIUM SERPL-MCNC: 9.1 MG/DL — SIGNIFICANT CHANGE UP (ref 8.4–10.5)
CHLORIDE BLDV-SCNC: 99 MMOL/L — SIGNIFICANT CHANGE UP (ref 96–108)
CHLORIDE SERPL-SCNC: 99 MMOL/L — SIGNIFICANT CHANGE UP (ref 98–107)
CO2 BLDV-SCNC: 40.2 MMOL/L — HIGH (ref 22–26)
CO2 SERPL-SCNC: 32 MMOL/L — HIGH (ref 22–31)
COLOR SPEC: YELLOW — SIGNIFICANT CHANGE UP
CREAT SERPL-MCNC: 0.44 MG/DL — LOW (ref 0.5–1.3)
CULTURE RESULTS: NO GROWTH — SIGNIFICANT CHANGE UP
DIFF PNL FLD: NEGATIVE — SIGNIFICANT CHANGE UP
EGFR: 113 ML/MIN/1.73M2 — SIGNIFICANT CHANGE UP
EOSINOPHIL # BLD AUTO: 0.46 K/UL — SIGNIFICANT CHANGE UP (ref 0–0.5)
EOSINOPHIL NFR BLD AUTO: 7.6 % — HIGH (ref 0–6)
EPI CELLS # UR: ABNORMAL
FLUAV SUBTYP SPEC NAA+PROBE: SIGNIFICANT CHANGE UP
FLUBV RNA SPEC QL NAA+PROBE: SIGNIFICANT CHANGE UP
GAS PNL BLDV: 133 MMOL/L — LOW (ref 136–145)
GLUCOSE BLDV-MCNC: 109 MG/DL — HIGH (ref 70–99)
GLUCOSE SERPL-MCNC: 110 MG/DL — HIGH (ref 70–99)
GLUCOSE UR QL: NEGATIVE — SIGNIFICANT CHANGE UP
GRAM STN FLD: SIGNIFICANT CHANGE UP
GRAM STN FLD: SIGNIFICANT CHANGE UP
HADV DNA SPEC QL NAA+PROBE: SIGNIFICANT CHANGE UP
HCO3 BLDV-SCNC: 38 MMOL/L — HIGH (ref 22–29)
HCOV 229E RNA SPEC QL NAA+PROBE: SIGNIFICANT CHANGE UP
HCOV HKU1 RNA SPEC QL NAA+PROBE: SIGNIFICANT CHANGE UP
HCOV NL63 RNA SPEC QL NAA+PROBE: SIGNIFICANT CHANGE UP
HCOV OC43 RNA SPEC QL NAA+PROBE: SIGNIFICANT CHANGE UP
HCT VFR BLD CALC: 40.5 % — SIGNIFICANT CHANGE UP (ref 34.5–45)
HCT VFR BLDA CALC: 39 % — SIGNIFICANT CHANGE UP (ref 34.5–46.5)
HGB BLD CALC-MCNC: 12.9 G/DL — SIGNIFICANT CHANGE UP (ref 11.5–15.5)
HGB BLD-MCNC: 12.7 G/DL — SIGNIFICANT CHANGE UP (ref 11.5–15.5)
HMPV RNA SPEC QL NAA+PROBE: SIGNIFICANT CHANGE UP
HPIV1 RNA SPEC QL NAA+PROBE: SIGNIFICANT CHANGE UP
HPIV2 RNA SPEC QL NAA+PROBE: SIGNIFICANT CHANGE UP
HPIV3 RNA SPEC QL NAA+PROBE: SIGNIFICANT CHANGE UP
HPIV4 RNA SPEC QL NAA+PROBE: SIGNIFICANT CHANGE UP
IANC: 3.55 K/UL — SIGNIFICANT CHANGE UP (ref 1.8–7.4)
IMM GRANULOCYTES NFR BLD AUTO: 0.3 % — SIGNIFICANT CHANGE UP (ref 0–1.5)
INR BLD: 0.95 RATIO — SIGNIFICANT CHANGE UP (ref 0.88–1.16)
KETONES UR-MCNC: ABNORMAL
LACTATE BLDV-MCNC: 2 MMOL/L — SIGNIFICANT CHANGE UP (ref 0.5–2)
LEUKOCYTE ESTERASE UR-ACNC: ABNORMAL
LYMPHOCYTES # BLD AUTO: 1.22 K/UL — SIGNIFICANT CHANGE UP (ref 1–3.3)
LYMPHOCYTES # BLD AUTO: 20.1 % — SIGNIFICANT CHANGE UP (ref 13–44)
M PNEUMO DNA SPEC QL NAA+PROBE: SIGNIFICANT CHANGE UP
MCHC RBC-ENTMCNC: 31.4 GM/DL — LOW (ref 32–36)
MCHC RBC-ENTMCNC: 34.6 PG — HIGH (ref 27–34)
MCV RBC AUTO: 110.4 FL — HIGH (ref 80–100)
METHOD TYPE: SIGNIFICANT CHANGE UP
MONOCYTES # BLD AUTO: 0.8 K/UL — SIGNIFICANT CHANGE UP (ref 0–0.9)
MONOCYTES NFR BLD AUTO: 13.2 % — SIGNIFICANT CHANGE UP (ref 2–14)
NEUTROPHILS # BLD AUTO: 3.55 K/UL — SIGNIFICANT CHANGE UP (ref 1.8–7.4)
NEUTROPHILS NFR BLD AUTO: 58.6 % — SIGNIFICANT CHANGE UP (ref 43–77)
NITRITE UR-MCNC: NEGATIVE — SIGNIFICANT CHANGE UP
NRBC # BLD: 0 /100 WBCS — SIGNIFICANT CHANGE UP
NRBC # FLD: 0 K/UL — SIGNIFICANT CHANGE UP
PCO2 BLDV: 72 MMHG — HIGH (ref 39–42)
PH BLDV: 7.33 — SIGNIFICANT CHANGE UP (ref 7.32–7.43)
PH UR: 6.5 — SIGNIFICANT CHANGE UP (ref 5–8)
PLATELET # BLD AUTO: 230 K/UL — SIGNIFICANT CHANGE UP (ref 150–400)
PO2 BLDV: 47 MMHG — SIGNIFICANT CHANGE UP
POTASSIUM BLDV-SCNC: 8.7 MMOL/L — CRITICAL HIGH (ref 3.5–5.1)
POTASSIUM SERPL-MCNC: 4 MMOL/L — SIGNIFICANT CHANGE UP (ref 3.5–5.3)
POTASSIUM SERPL-SCNC: 4 MMOL/L — SIGNIFICANT CHANGE UP (ref 3.5–5.3)
PROT SERPL-MCNC: 7.7 G/DL — SIGNIFICANT CHANGE UP (ref 6–8.3)
PROT UR-MCNC: ABNORMAL
PROTHROM AB SERPL-ACNC: 11 SEC — SIGNIFICANT CHANGE UP (ref 10.5–13.4)
RAPID RVP RESULT: SIGNIFICANT CHANGE UP
RBC # BLD: 3.67 M/UL — LOW (ref 3.8–5.2)
RBC # FLD: 15.1 % — HIGH (ref 10.3–14.5)
RSV RNA SPEC QL NAA+PROBE: SIGNIFICANT CHANGE UP
RV+EV RNA SPEC QL NAA+PROBE: SIGNIFICANT CHANGE UP
SAO2 % BLDV: 73.1 % — SIGNIFICANT CHANGE UP
SARS-COV-2 RNA SPEC QL NAA+PROBE: SIGNIFICANT CHANGE UP
SODIUM SERPL-SCNC: 142 MMOL/L — SIGNIFICANT CHANGE UP (ref 135–145)
SP GR SPEC: >1.04 — SIGNIFICANT CHANGE UP (ref 1–1.05)
SPECIMEN SOURCE: SIGNIFICANT CHANGE UP
SPECIMEN SOURCE: SIGNIFICANT CHANGE UP
UROBILINOGEN FLD QL: SIGNIFICANT CHANGE UP
WBC # BLD: 6.06 K/UL — SIGNIFICANT CHANGE UP (ref 3.8–10.5)
WBC # FLD AUTO: 6.06 K/UL — SIGNIFICANT CHANGE UP (ref 3.8–10.5)
WBC UR QL: SIGNIFICANT CHANGE UP /HPF (ref 0–5)

## 2022-06-24 PROCEDURE — 99285 EMERGENCY DEPT VISIT HI MDM: CPT

## 2022-06-24 PROCEDURE — 74177 CT ABD & PELVIS W/CONTRAST: CPT | Mod: 26,MA

## 2022-06-24 PROCEDURE — 71045 X-RAY EXAM CHEST 1 VIEW: CPT | Mod: 26

## 2022-06-24 PROCEDURE — 99223 1ST HOSP IP/OBS HIGH 75: CPT | Mod: GC

## 2022-06-24 RX ORDER — SENNA PLUS 8.6 MG/1
17.6 TABLET ORAL AT BEDTIME
Refills: 0 | Status: DISCONTINUED | OUTPATIENT
Start: 2022-06-24 | End: 2022-06-27

## 2022-06-24 RX ORDER — LACOSAMIDE 50 MG/1
200 TABLET ORAL
Refills: 0 | Status: DISCONTINUED | OUTPATIENT
Start: 2022-06-24 | End: 2022-06-26

## 2022-06-24 RX ORDER — VANCOMYCIN HCL 1 G
1250 VIAL (EA) INTRAVENOUS EVERY 12 HOURS
Refills: 0 | Status: DISCONTINUED | OUTPATIENT
Start: 2022-06-25 | End: 2022-06-25

## 2022-06-24 RX ORDER — VANCOMYCIN HCL 1 G
1000 VIAL (EA) INTRAVENOUS ONCE
Refills: 0 | Status: COMPLETED | OUTPATIENT
Start: 2022-06-24 | End: 2022-06-24

## 2022-06-24 RX ORDER — ENOXAPARIN SODIUM 100 MG/ML
40 INJECTION SUBCUTANEOUS EVERY 24 HOURS
Refills: 0 | Status: DISCONTINUED | OUTPATIENT
Start: 2022-06-24 | End: 2022-06-27

## 2022-06-24 RX ORDER — LACTULOSE 10 G/15ML
15 SOLUTION ORAL DAILY
Refills: 0 | Status: DISCONTINUED | OUTPATIENT
Start: 2022-06-24 | End: 2022-06-27

## 2022-06-24 RX ORDER — FERROUS SULFATE 325(65) MG
300 TABLET ORAL DAILY
Refills: 0 | Status: DISCONTINUED | OUTPATIENT
Start: 2022-06-24 | End: 2022-06-27

## 2022-06-24 RX ORDER — PIPERACILLIN AND TAZOBACTAM 4; .5 G/20ML; G/20ML
3.38 INJECTION, POWDER, LYOPHILIZED, FOR SOLUTION INTRAVENOUS ONCE
Refills: 0 | Status: COMPLETED | OUTPATIENT
Start: 2022-06-24 | End: 2022-06-24

## 2022-06-24 RX ORDER — VALPROIC ACID (AS SODIUM SALT) 250 MG/5ML
750 SOLUTION, ORAL ORAL THREE TIMES A DAY
Refills: 0 | Status: DISCONTINUED | OUTPATIENT
Start: 2022-06-24 | End: 2022-06-27

## 2022-06-24 RX ORDER — LEVETIRACETAM 250 MG/1
500 TABLET, FILM COATED ORAL
Refills: 0 | Status: DISCONTINUED | OUTPATIENT
Start: 2022-06-24 | End: 2022-06-27

## 2022-06-24 RX ORDER — IPRATROPIUM/ALBUTEROL SULFATE 18-103MCG
3 AEROSOL WITH ADAPTER (GRAM) INHALATION EVERY 6 HOURS
Refills: 0 | Status: DISCONTINUED | OUTPATIENT
Start: 2022-06-24 | End: 2022-06-27

## 2022-06-24 RX ORDER — CALCIUM CARBONATE 500(1250)
1250 TABLET ORAL DAILY
Refills: 0 | Status: DISCONTINUED | OUTPATIENT
Start: 2022-06-24 | End: 2022-06-27

## 2022-06-24 RX ORDER — PIPERACILLIN AND TAZOBACTAM 4; .5 G/20ML; G/20ML
3.38 INJECTION, POWDER, LYOPHILIZED, FOR SOLUTION INTRAVENOUS EVERY 8 HOURS
Refills: 0 | Status: DISCONTINUED | OUTPATIENT
Start: 2022-06-24 | End: 2022-06-25

## 2022-06-24 RX ORDER — SALICYLIC ACID 0.5 %
1 CLEANSER (GRAM) TOPICAL DAILY
Refills: 0 | Status: DISCONTINUED | OUTPATIENT
Start: 2022-06-24 | End: 2022-06-27

## 2022-06-24 RX ADMIN — ENOXAPARIN SODIUM 40 MILLIGRAM(S): 100 INJECTION SUBCUTANEOUS at 17:23

## 2022-06-24 RX ADMIN — Medication 250 MILLIGRAM(S): at 16:39

## 2022-06-24 RX ADMIN — Medication 1250 MILLIGRAM(S): at 18:55

## 2022-06-24 RX ADMIN — PIPERACILLIN AND TAZOBACTAM 200 GRAM(S): 4; .5 INJECTION, POWDER, LYOPHILIZED, FOR SOLUTION INTRAVENOUS at 15:23

## 2022-06-24 RX ADMIN — LEVETIRACETAM 500 MILLIGRAM(S): 250 TABLET, FILM COATED ORAL at 18:55

## 2022-06-24 RX ADMIN — LACTULOSE 15 GRAM(S): 10 SOLUTION ORAL at 18:56

## 2022-06-24 NOTE — H&P ADULT - PROBLEM SELECTOR PLAN 6
- DVT: lovenox  - constipation: lactulose and Senna  - dispo: back to group home - DVT: lovenox  - constipation: lactulose and Senna  - wound: wound care consult  - dispo: back to group home

## 2022-06-24 NOTE — ED PROCEDURE NOTE - ATTENDING CONTRIBUTION TO CARE
I have personally performed a history and physical examination of the patient and discussed management with the resident as well as the patient.  I reviewed the resident's note and agree with the documented findings and plan of care.  I have authored and modified critical sections of the Provider Note, including but not limited to HPI, Physical Exam and MDM.    Uncomplicated US guided access.

## 2022-06-24 NOTE — DISCHARGE NOTE PROVIDER - NSDCCPCAREPLAN_GEN_ALL_CORE_FT
PRINCIPAL DISCHARGE DIAGNOSIS  Diagnosis: Positive blood culture  Assessment and Plan of Treatment: Your blood culture was +ve for MRSGABY. We ordered ECHO which showed ___. You received IV antibiotics and your blood culture was clear. We discharged you back to your group home. If you have infections, fevers, altered mental status, difficulty urinating, please return to the ED.       PRINCIPAL DISCHARGE DIAGNOSIS  Diagnosis: Positive blood culture  Assessment and Plan of Treatment: Your blood culture was positive for a bacteria called GIANA. We ordered an echocardiogram which was not able to be interpreted. You received IV antibiotics. The Infectious Disease team determined that the bacteria collected from your blood was likely a contaminant. We stopped your antibiotics and monitered you for signs of infection. You remained stable without antibiotics. We discharged you back to your group home. If you have infections, fevers, altered mental status, difficulty urinating, please return to the ED.

## 2022-06-24 NOTE — H&P ADULT - ATTENDING COMMENTS
57F with hx of cerebral palsy (bedbound at baseline, from group home), seizure disorder, dysphagia s/p PEG, frequent aspiration PNA/UTI, called back for abnormal lab result (gram positive cocci in blood cultures) from prior ED visit. VS notable for hypothermia, labs reviewed 6/23 BCx with GPC in clusters - methicillin resistant staph epi 4/4 bottles. CT C/A/P negative. Lower suspicion for contaminant as 4/4 bottles - empiric abx pending repeat cultures. Obtain TTE. ID consult in AM. Rest of care as above.

## 2022-06-24 NOTE — H&P ADULT - HISTORY OF PRESENT ILLNESS
57F PMH cerebral palsy, seizure d/o, bedbound at baseline, frequent aspiration PNA and UTI, presented to the ED today due to bacteremia growing gram + cocci. Pt initially presented to the ED due to hemoptysis w/ streaks of blood desatting to 87% yesterday. She was later dc'd yesterday. However her long term facility ([facility]) sent her back because her blood clx grew gram + cocci.    Spoke w/ facility RN. Reported that pt has always been wheezing and has been receiving albuterol and budesonide via mask. She has been taking bactrim due to frequent UTI. Regarding her frequent aspiration PNA they cannot do suction PRN at the facility and therefore had to keep her here for a month at her last visit. She has a distended belly at her baseline and a stage 3 ulcer following by wound care. Her PEG was dislodged, changed a few months ago, and was clogged once during her previous admission.    In the ED, pt was hypothermic, received vanc and zosyn, pending repeat blood clx. CT chest showed opacifications, no PE; CT abdomen and pelvis showed no significant pathology. UA weakly +ve.

## 2022-06-24 NOTE — ED ADULT NURSE NOTE - INTERVENTIONS DEFINITIONS
Randall to call system/Call bell, personal items and telephone within reach/Instruct patient to call for assistance/Room bathroom lighting operational/Non-slip footwear when patient is off stretcher/Physically safe environment: no spills, clutter or unnecessary equipment/Stretcher in lowest position, wheels locked, appropriate side rails in place/Provide visual cue, wrist band, yellow gown, etc.

## 2022-06-24 NOTE — ED ADULT TRIAGE NOTE - CHIEF COMPLAINT QUOTE
RN for [facility] contact number [phone] not answering at triage time pt was d/c d yesterday and arrives with peg tube( pt had recent hemoptysis as per group home) pt moves when attempting temp, temp unable at triage time axillary /oral unable

## 2022-06-24 NOTE — DISCHARGE NOTE PROVIDER - PROVIDER TOKENS
FREE:[LAST:[Physician],FIRST:[Primary Care],PHONE:[(   )    -],FAX:[(   )    -],ADDRESS:[[facility]]]

## 2022-06-24 NOTE — ED POST DISCHARGE NOTE - REASON FOR FOLLOW-UP
Other Received call from [employer] Positive BCX: Gram pos cocci in clusters in anaerobic bottle. S/W RN Stephie  discussed with Stephie patient needs to retrun to ED for repeat BCX. Discussed with Stephie BCX if true infection can lead to bacteremia and sepsis and or death. Stephie s/w Resident and patient will be sent back to [facility] for further evaluation.

## 2022-06-24 NOTE — ED PROVIDER NOTE - PHYSICAL EXAMINATION
Const: Pt lying in bed, legs contracted.   Eyes: no conjunctival injection, and symmetrical lids.  HEENT: Head NCAT, no lesions. Atraumatic external nose and ears. Moist MM.  Neck: Symmetric, trachea midline.   RESP: Unlabored respiratory effort.   GI: PEG tube in place, obese abdomen. Pt whimpers on palpation.   MSK: Normocephalic/Atraumatic   Skin: Warm, dry and intact.   Neuro: Unable to assess  Psych: Awake, Alert, & Oriented (AAO) x1 Const: Pt lying in bed, legs contracted.   Eyes: no conjunctival injection, and symmetrical lids.  HEENT: Head NCAT, no lesions. Atraumatic external nose and ears. Moist MM.  Neck: Symmetric, trachea midline.   RESP: Unlabored respiratory effort. Intermittent dry cough.  GI: PEG tube in place, obese abdomen. Pt whimpers on palpation.   MSK: Normocephalic/Atraumatic   Skin: Warm, dry and intact.   Neuro: Unable to assess  Psych: Awake, Alert, & Oriented (AAO) x1

## 2022-06-24 NOTE — ED PROVIDER NOTE - ATTENDING CONTRIBUTION TO CARE
I have personally performed a history and physical examination of the patient and discussed management with the resident as well as the patient.  I reviewed the resident's note and agree with the documented findings and plan of care.  I have authored and modified critical sections of the Provider Note, including but not limited to HPI, Physical Exam and MDM.    57 y.o female with a PMhx of cerebral palsy(non-verbal, bedbound at baseline), profound intellectual disability seizures d/o, constipation, multiple admission for aspiration PNA(pseudomonas and MRSA in sputum) typically requiring intubation, dysphagia s/p peg w/ 2 recent admission for aspiration, presented to the ED for gram positive cocci in blood cultures that grew during previous hospital visit for hemoptysis (cta chest no PE). Pt arrives to ED alone. Pt is nonverbal (baseline) and does not respond, no collateral information at this time. Recent CTA chest unremarkable. Will obtain sepsis workup with cbc, cmp, bc, vbg, lactate. and look for source of infection with CT a/p given abdominal exam. Admit.

## 2022-06-24 NOTE — ED ADULT NURSE NOTE - OBJECTIVE STATEMENT
Pt received AOx0, non-ambulatory @ baseline, non verbal at baseline w. PMH of cerebral palsy, intellectual disability, seizures, constipation, aspiration PNA, dysphagia s/p peg tube sent from Nursing home for abnormal lab values, for gram positive cocci in blood cultures. . Pt was d/c from hospital yesterday after being treated for hemoptysis and hypoxic. Unstageable pressure ulcer noted to right heel. Rectal temp 95.7F, MD made aware. Breathing even and unlabored, saturating ~97% on RA. Vs as noted in flow sheet. Safety precautions maintained.

## 2022-06-24 NOTE — H&P ADULT - NSHPPHYSICALEXAM_GEN_ALL_CORE
VITALS:   T(C): 36.4 (06-24-22 @ 15:30), Max: 36.4 (06-24-22 @ 15:30)  HR: 87 (06-24-22 @ 15:30) (72 - 88)  BP: 114/58 (06-24-22 @ 15:30) (104/64 - 129/104)  RR: 18 (06-24-22 @ 15:30) (18 - 18)  SpO2: 98% (06-24-22 @ 15:30) (95% - 98%)    GENERAL: responsive to voice only, not following commands  HEAD:  Atraumatic, normocephalic  EYES: EOMI, PERRLA, conjunctiva and sclera clear  ENT: pt refused to open mouth  NECK: Supple, no JVD  HEART: Regular rate and rhythm, no murmurs, rubs, or gallops  LUNGS: Unlabored respirations.  Clear to auscultation bilaterally, upper airway wheezes  ABDOMEN: PEG site clean w/o drainage, distended  EXTREMITIES: 2+ peripheral pulses bilaterally. No clubbing, cyanosis, or edema  NERVOUS SYSTEM:  A&Ox0  SKIN: per RN at facility has a stage 3 ulcer

## 2022-06-24 NOTE — ED ADULT TRIAGE NOTE - CCCP TRG CHIEF CMPLNT
pt was brought back from group home as per EMS for positive for abnormal lab result group home could not elaborate/abnormal lab result

## 2022-06-24 NOTE — ED PROVIDER NOTE - PROGRESS NOTE DETAILS
Hugo Skelton, PGY1 CT abdomen shows no findings. wbc is normal, however, bcx grew in multiple bottles. will dose with vanc and zosyn

## 2022-06-24 NOTE — ED ADULT NURSE REASSESSMENT NOTE - NS ED NURSE REASSESS COMMENT FT1
Received PT in stable condition.  PT A&OX0, non verbal.  Peg tube intact.  IV ABX initiated.  COVID swab sent.  Will continue to monitor.

## 2022-06-24 NOTE — DISCHARGE NOTE PROVIDER - HOSPITAL COURSE
57F PMH cerebral palsy, seizure d/o, bedbound at baseline, frequent aspiration PNA and UTI, presented to the ED today due to bacteremia growing gram + cocci. Pt initially presented to the ED due to hemoptysis w/ streaks of blood desatting to 87% yesterday. She was later dc'd yesterday. However her long term facility ([facility]) sent her back because her blood clx grew gram + cocci.    Pt's blood clx grew MRSE for which she received vancomycin. Considering her Hx of recurrent aspiration PNA she received zosyn too. She received abx for overall ____ days. Her ECHO showed _____. She is HD stable and dc'd back to her group home on _______.    57F PMH cerebral palsy, seizure d/o, bedbound at baseline, frequent aspiration PNA and UTI, presented to the ED today due to bacteremia growing gram + cocci. Pt initially presented to the ED due to hemoptysis w/ streaks of blood desatting to 87% yesterday. She was later dc'd yesterday. However her long term facility ([facility]) sent her back because her blood clx grew gram + cocci.    Pt's blood clx grew MRSE for which she received vancomycin. Considering her Hx of recurrent aspiration PNA she received zosyn too. She received abx for overall 2 days. ID was consulted and recommended stopping vancomycin and zosyn, as they suspected the gram positive bacteria in her culture to be a contaminant. They additionally identified a sacral decubitus ulcer. Her ECHO showed uninterpretable findings. She is HD stable and dc'd back to her group home on _______.    57F PMH cerebral palsy, seizure d/o, bedbound at baseline, frequent aspiration PNA and UTI, presented to the ED today due to bacteremia growing gram + cocci. Pt initially presented to the ED due to hemoptysis w/ streaks of blood desatting to 87% yesterday. She was later dc'd yesterday. However her long term facility ([facility]) sent her back because her blood clx grew gram + cocci.    Pt's blood clx grew MRSE for which she received vancomycin. Considering her Hx of recurrent aspiration PNA she received zosyn too. She received abx for overall 2 days. ID was consulted and recommended stopping vancomycin and zosyn, as they suspected the gram positive bacteria in her culture to be a contaminant. They additionally identified a sacral decubitus ulcer. Her ECHO showed uninterpretable findings. She is HD stable and dc'd back to her group home.

## 2022-06-24 NOTE — DISCHARGE NOTE PROVIDER - NSDCMRMEDTOKEN_GEN_ALL_CORE_FT
albuterol 2.5 mg/3 mL (0.083%) inhalation solution: 3 milliliter(s) inhaled every 6 hours, As Needed for shortness of breath/wheezing  budesonide: 0.5 milligram(s) inhaled 2 times a day   calcium carbonate 1250 mg/5 mL (100 mg/mL elemental calcium) oral suspension: 5 milliliter(s) orally once a day  ferrous sulfate 220 mg/5 mL (44 mg/5 mL elemental iron) oral elixir: 7.5 milliliter(s) orally once a day  Fosamax 70 mg oral tablet: 1 tab(s) orally once a week  lacosamide 10 mg/mL oral solution: 200 milligram(s) by gastrostomy tube 3 times a day at 0700 1500  2200 MDD:600 mg  lactulose 10 g/15 mL oral syrup: 22.5 milliliter(s) orally once a day as needed for constipation HOLD FOR LOOSE STOOLS  levETIRAcetam 100 mg/mL oral solution: 6 milliliter(s) by gastrostomy tube 2 times a day  Milk of Magnesia 8% oral suspension: 30 milliliter(s) orally once a day (at bedtime)  Nayzilam 5 mg/inh nasal spray: 1 puff(s) nasal once PRN for seizure lastiing &gt;4 minutes  Senna 8.8 mg/5 mL oral syrup: 17.6 milliliter(s) by gastrostomy tube once a day (at bedtime)  sulfamethoxazole-trimethoprim 200 mg-40 mg/5 mL oral suspension: 10 milliliter(s) orally once a day  valproic acid 250 mg/5 mL oral liquid: 750 milligram(s) by gastrostomy tube 3 times a day  vitamin A and D topical ointment: 1 application topically once a day to affected area

## 2022-06-24 NOTE — H&P ADULT - NSHPLABSRESULTS_GEN_ALL_CORE
06-24    142  |  99  |  21  ----------------------------<  110<H>  4.0   |  32<H>  |  0.44<L>  06-23    136  |  97<L>  |  20  ----------------------------<  77  4.7   |  29  |  0.48<L>    Ca    9.1      24 Jun 2022 12:00  Ca    9.0      23 Jun 2022 03:48    TPro  7.7  /  Alb  3.3  /  TBili  0.3  /  DBili  x   /  AST  45<H>  /  ALT  25  /  AlkPhos  116  06-24  TPro  7.2  /  Alb  3.0<L>  /  TBili  0.4  /  DBili  x   /  AST  47<H>  /  ALT  20  /  AlkPhos  85  06-23      PT/INR - ( 24 Jun 2022 12:00 )   PT: 11.0 sec;   INR: 0.95 ratio         PTT - ( 24 Jun 2022 12:00 )  PTT:36.3 sec              Urinalysis Basic - ( 24 Jun 2022 12:30 )    Color: Yellow / Appearance: Slightly Turbid / SG: >1.040 / pH: x  Gluc: x / Ketone: Small  / Bili: Negative / Urobili: <2 mg/dL   Blood: x / Protein: 100 mg/dL / Nitrite: Negative   Leuk Esterase: Large / RBC: x / WBC 5-10 /HPF   Sq Epi: x / Non Sq Epi: Moderate / Bacteria: x                              12.7   6.06  )-----------( 230      ( 24 Jun 2022 12:00 )             40.5                         12.6   6.31  )-----------( 166      ( 23 Jun 2022 03:48 )             38.6     CAPILLARY BLOOD GLUCOSE      POCT Blood Glucose.: 124 mg/dL (24 Jun 2022 11:06)

## 2022-06-24 NOTE — ED PROVIDER NOTE - CLINICAL SUMMARY MEDICAL DECISION MAKING FREE TEXT BOX
57 y.o female with a PMhx of cerebral palsy(non-verbal, bedbound at baseline), profound intellectual disability seizures d/o, constipation, multiple admission for aspiration PNA(pseudomonas and MRSA in sputum) typically requiring intubation, dysphagia s/p peg w/ 2 recent admission for aspiration, presented to the ED for gram positive cocci in blood cultures. Will get sepsis workup and look for source of infection.

## 2022-06-24 NOTE — ED ADULT NURSE NOTE - CHIEF COMPLAINT QUOTE
RN for [facility] contact number [phone] not answering at triage time pt was d/c d yesterday and arrives with peg tube( pt had recent hemoptysis as per group home) pt moves when attempting temp, temp unable at triage time axillary /oral unable

## 2022-06-24 NOTE — H&P ADULT - PROBLEM SELECTOR PLAN 5
- PEG was functioning w/o leakage or signs of infxn  - c/w TF (Jevity 1.5 fabian at 50cc/hr) w/ flushes Former smoker quit 30 years ago (60 pack year history). Has a glass of etoh/nightly. Denies illicit drugs. Lives with wife. 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 Former smoker quit 30 years ago (60 pack year history). Has one glass of etoh/nightly. Denies illicit drugs. Lives with wife. 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 - PEG was functioning w/o leakage or signs of infxn  - c/w TF w/ flushes

## 2022-06-24 NOTE — DISCHARGE NOTE PROVIDER - NSDCCPTREATMENT_GEN_ALL_CORE_FT
PRINCIPAL PROCEDURE  Procedure: CT chest, abdomen and pelvis  Findings and Treatment: FINDINGS:  LOWER CHEST: Linear calcification at the left lung base. Scattered   calcifications in the right middle lobe. Mildly enlarged heart.  LIVER: Within normal limits.  BILE DUCTS: Normal caliber.  GALLBLADDER: Within normal limits.  SPLEEN: Within normal limits.  PANCREAS: Within normal limits.  ADRENALS: Within normal limits.  KIDNEYS/URETERS: Within normal limits.  BLADDER: Within normal limits.  REPRODUCTIVE ORGANS: Uterus and bilateral adnexa within normal limits.  BOWEL: No bowel obstruction. Appendix is normal. Diverticulosis, without   acute diverticulitis. Gastrostomy tube in stomach.  PERITONEUM: No ascites.  VESSELS: Within normal limits.  RETROPERITONEUM/LYMPH NODES: No lymphadenopathy.  ABDOMINAL WALL: Within normal limits.  BONES: Right femoral intramedullary karma.  IMPRESSION:  No evidence of acute abnormality in the abdomen and pelvis.  PULMONARY ANGIOGRAM: Adequate opacification the pulmonary arterial tree   to the level of the distal segmental pulmonary arteries. Evaluation of   the subsegmental pulmonary arteries is limited secondary to respiratory   motion.  LYMPH NODES: No lymphadenopathy.  HEART/VASCULATURE: Heart size is normal.  AIRWAYS/LUNGS/PLEURA: Limited evaluation secondary to significant   respiratory motion. Mosaic attenuation the lungs, likely related to   expiratory motion. Branching opacities in the right upper lobe and middle   lobe are unchanged allowing for differences in technique. Left basilar   linear atelectasis.  UPPER ABDOMEN: Within normal limits.  BONES/SOFT TISSUES: Severe degenerative changes of the spine.  IMPRESSION:  1.  No pulmonary embolism.

## 2022-06-24 NOTE — H&P ADULT - PROBLEM SELECTOR PLAN 1
- pt did not meet SIRS, but was hypothermic and had + blood clx growing gram + cocci  - c/w vanc considering hx of MRSA  - c/w zosyn considering hx of asp PNA growing pseudomonas and a weakly + UA  - f/u blood and urine clx, f/u sensitivity for downgrade - pt did not meet SIRS, but was hypothermic and had + blood clx growing gram + cocci  - c/w vanc considering hx of MRSA  - c/w zosyn considering hx of asp PNA growing pseudomonas and a weakly + UA  - f/u blood and urine clx, f/u sensitivity for downgrade  - ECHO to r/o valvular conditions causing bacteremia - pt did not meet SIRS, but was hypothermic and had + blood clx growing gram + cocci  - c/w vanc considering hx of MRSA, blood clx grows MRSE  - c/w zosyn considering hx of asp PNA growing pseudomonas and a weakly + UA  - f/u blood and urine clx, f/u sensitivity for downgrade  - ECHO to r/o valvular conditions causing bacteremia  - ID consult tmr AM

## 2022-06-24 NOTE — ED PROVIDER NOTE - OBJECTIVE STATEMENT
57 y.o female with a PMhx of cerebral palsy(non-verbal, bedbound at baseline), profound intellectual disability seizures d/o, constipation, multiple admission for aspiration PNA(pseudomonas and MRSA in sputum) typically requiring intubation, dysphagia s/p peg w/ 2 recent admission for aspiration, presented to the ED for gram positive cocci in blood cultures that grew during previous hospital visit for hemoptysis (cta chest no PE). Pt arrives to ED alone. Pt is nonverbal and does not respond. 57 y.o female with a PMhx of cerebral palsy(non-verbal, bedbound at baseline), profound intellectual disability seizures d/o, constipation, multiple admission for aspiration PNA(pseudomonas and MRSA in sputum) typically requiring intubation, dysphagia s/p peg w/ 2 recent admission for aspiration, presented to the ED for gram positive cocci in blood cultures that grew during previous hospital visit for hemoptysis (cta chest no PE). Pt arrives to ED alone. Pt is nonverbal (baseline) and does not respond.

## 2022-06-25 LAB
ALBUMIN SERPL ELPH-MCNC: 2.8 G/DL — LOW (ref 3.3–5)
ALP SERPL-CCNC: 87 U/L — SIGNIFICANT CHANGE UP (ref 40–120)
ALT FLD-CCNC: 22 U/L — SIGNIFICANT CHANGE UP (ref 4–33)
ANION GAP SERPL CALC-SCNC: 12 MMOL/L — SIGNIFICANT CHANGE UP (ref 7–14)
AST SERPL-CCNC: 32 U/L — SIGNIFICANT CHANGE UP (ref 4–32)
BILIRUB SERPL-MCNC: 0.3 MG/DL — SIGNIFICANT CHANGE UP (ref 0.2–1.2)
BUN SERPL-MCNC: 18 MG/DL — SIGNIFICANT CHANGE UP (ref 7–23)
CALCIUM SERPL-MCNC: 8.7 MG/DL — SIGNIFICANT CHANGE UP (ref 8.4–10.5)
CHLORIDE SERPL-SCNC: 98 MMOL/L — SIGNIFICANT CHANGE UP (ref 98–107)
CO2 SERPL-SCNC: 28 MMOL/L — SIGNIFICANT CHANGE UP (ref 22–31)
CREAT SERPL-MCNC: 0.54 MG/DL — SIGNIFICANT CHANGE UP (ref 0.5–1.3)
CULTURE RESULTS: SIGNIFICANT CHANGE UP
CULTURE RESULTS: SIGNIFICANT CHANGE UP
EGFR: 108 ML/MIN/1.73M2 — SIGNIFICANT CHANGE UP
GLUCOSE SERPL-MCNC: 137 MG/DL — HIGH (ref 70–99)
HCT VFR BLD CALC: 37.2 % — SIGNIFICANT CHANGE UP (ref 34.5–45)
HGB BLD-MCNC: 12 G/DL — SIGNIFICANT CHANGE UP (ref 11.5–15.5)
MAGNESIUM SERPL-MCNC: 1.8 MG/DL — SIGNIFICANT CHANGE UP (ref 1.6–2.6)
MCHC RBC-ENTMCNC: 32.3 GM/DL — SIGNIFICANT CHANGE UP (ref 32–36)
MCHC RBC-ENTMCNC: 35 PG — HIGH (ref 27–34)
MCV RBC AUTO: 108.5 FL — HIGH (ref 80–100)
NRBC # BLD: 0 /100 WBCS — SIGNIFICANT CHANGE UP
NRBC # FLD: 0.02 K/UL — HIGH
ORGANISM # SPEC MICROSCOPIC CNT: SIGNIFICANT CHANGE UP
ORGANISM # SPEC MICROSCOPIC CNT: SIGNIFICANT CHANGE UP
PHOSPHATE SERPL-MCNC: 5.2 MG/DL — HIGH (ref 2.5–4.5)
PLATELET # BLD AUTO: 221 K/UL — SIGNIFICANT CHANGE UP (ref 150–400)
POTASSIUM SERPL-MCNC: 4 MMOL/L — SIGNIFICANT CHANGE UP (ref 3.5–5.3)
POTASSIUM SERPL-SCNC: 4 MMOL/L — SIGNIFICANT CHANGE UP (ref 3.5–5.3)
PROT SERPL-MCNC: 6.9 G/DL — SIGNIFICANT CHANGE UP (ref 6–8.3)
RBC # BLD: 3.43 M/UL — LOW (ref 3.8–5.2)
RBC # FLD: 15.8 % — HIGH (ref 10.3–14.5)
SODIUM SERPL-SCNC: 138 MMOL/L — SIGNIFICANT CHANGE UP (ref 135–145)
SPECIMEN SOURCE: SIGNIFICANT CHANGE UP
SPECIMEN SOURCE: SIGNIFICANT CHANGE UP
WBC # BLD: 6.01 K/UL — SIGNIFICANT CHANGE UP (ref 3.8–10.5)
WBC # FLD AUTO: 6.01 K/UL — SIGNIFICANT CHANGE UP (ref 3.8–10.5)

## 2022-06-25 PROCEDURE — 93306 TTE W/DOPPLER COMPLETE: CPT | Mod: 26

## 2022-06-25 PROCEDURE — 99233 SBSQ HOSP IP/OBS HIGH 50: CPT | Mod: GC

## 2022-06-25 PROCEDURE — 99222 1ST HOSP IP/OBS MODERATE 55: CPT | Mod: GC

## 2022-06-25 RX ADMIN — Medication 300 MILLIGRAM(S): at 12:24

## 2022-06-25 RX ADMIN — LEVETIRACETAM 500 MILLIGRAM(S): 250 TABLET, FILM COATED ORAL at 06:31

## 2022-06-25 RX ADMIN — Medication 3 MILLILITER(S): at 12:55

## 2022-06-25 RX ADMIN — Medication 1250 MILLIGRAM(S): at 12:24

## 2022-06-25 RX ADMIN — Medication 166.67 MILLIGRAM(S): at 06:30

## 2022-06-25 RX ADMIN — Medication 750 MILLIGRAM(S): at 00:54

## 2022-06-25 RX ADMIN — LEVETIRACETAM 500 MILLIGRAM(S): 250 TABLET, FILM COATED ORAL at 18:41

## 2022-06-25 RX ADMIN — Medication 1 APPLICATION(S): at 17:10

## 2022-06-25 RX ADMIN — SENNA PLUS 17.6 MILLILITER(S): 8.6 TABLET ORAL at 21:50

## 2022-06-25 RX ADMIN — PIPERACILLIN AND TAZOBACTAM 25 GRAM(S): 4; .5 INJECTION, POWDER, LYOPHILIZED, FOR SOLUTION INTRAVENOUS at 12:32

## 2022-06-25 RX ADMIN — Medication 750 MILLIGRAM(S): at 18:50

## 2022-06-25 RX ADMIN — LACOSAMIDE 200 MILLIGRAM(S): 50 TABLET ORAL at 06:31

## 2022-06-25 RX ADMIN — SENNA PLUS 17.6 MILLILITER(S): 8.6 TABLET ORAL at 00:54

## 2022-06-25 RX ADMIN — Medication 750 MILLIGRAM(S): at 21:51

## 2022-06-25 RX ADMIN — Medication 3 MILLILITER(S): at 16:42

## 2022-06-25 RX ADMIN — Medication 3 MILLILITER(S): at 21:08

## 2022-06-25 RX ADMIN — LACOSAMIDE 200 MILLIGRAM(S): 50 TABLET ORAL at 00:54

## 2022-06-25 RX ADMIN — LACOSAMIDE 200 MILLIGRAM(S): 50 TABLET ORAL at 14:54

## 2022-06-25 RX ADMIN — Medication 750 MILLIGRAM(S): at 06:31

## 2022-06-25 RX ADMIN — LACOSAMIDE 200 MILLIGRAM(S): 50 TABLET ORAL at 21:50

## 2022-06-25 RX ADMIN — PIPERACILLIN AND TAZOBACTAM 25 GRAM(S): 4; .5 INJECTION, POWDER, LYOPHILIZED, FOR SOLUTION INTRAVENOUS at 01:40

## 2022-06-25 RX ADMIN — ENOXAPARIN SODIUM 40 MILLIGRAM(S): 100 INJECTION SUBCUTANEOUS at 18:42

## 2022-06-25 NOTE — DIETITIAN INITIAL EVALUATION ADULT - ENTERAL
Consider adjusting tube feed to [place] HN @ 40 mL/hr. x 24 hrs. for total volume 960 mL. Provides 1,920 kcal (33.5 kcal/kg), 80.2 g pro (1.4 g/kg) based on IBW +10% 57.3 kg and 672 mL of fluid (TF free water), defer additional free water flushes to team. Begin tube feeding @ 10 mL/hr. and increase by 10 mL/hr. every 6 hrs. to goal rate @ 40 mL/hr.

## 2022-06-25 NOTE — PROGRESS NOTE ADULT - SUBJECTIVE AND OBJECTIVE BOX
Patient is a 57y old  Female who presents with a chief complaint of BACTEREMIA (25 Jun 2022 06:29)      INTERVAL HPI/OVERNIGHT EVENTS: ID on board for gram positive bacteremia.      REVIEW OF SYSTEMS:  CONSTITUTIONAL: No fever, weight loss, or fatigue  EYES: No eye pain, visual disturbances, or discharge  ENMT:  No difficulty hearing, tinnitus, vertigo; No sinus or throat pain  NECK: No pain or stiffness  BREASTS: No pain, masses, or nipple discharge  RESPIRATORY: No cough, wheezing, chills or hemoptysis; No shortness of breath  CARDIOVASCULAR: No chest pain, palpitations, dizziness, or leg swelling  GASTROINTESTINAL: No abdominal or epigastric pain. No nausea, vomiting, or hematemesis; No diarrhea or constipation. No melena or hematochezia.  GENITOURINARY: No dysuria, frequency, hematuria, or incontinence  NEUROLOGICAL: No headaches, memory loss, loss of strength, numbness, or tremors  SKIN: No itching, burning, rashes, or lesions   LYMPH NODES: No enlarged glands  ENDOCRINE: No heat or cold intolerance; No hair loss  MUSCULOSKELETAL: No joint pain or swelling; No muscle, back, or extremity pain  PSYCHIATRIC: No depression, anxiety, mood swings, or difficulty sleeping  HEME/LYMPH: No easy bruising, or bleeding gums  ALLERGY AND IMMUNOLOGIC: No hives or eczema    FAMILY HISTORY:      Vital Signs Last 24 Hrs  T(C): 36.1 (25 Jun 2022 06:24), Max: 36.4 (24 Jun 2022 15:30)  T(F): 96.9 (25 Jun 2022 06:24), Max: 97.5 (24 Jun 2022 15:30)  HR: 67 (25 Jun 2022 06:24) (67 - 88)  BP: 109/67 (25 Jun 2022 06:24) (108/74 - 129/104)  BP(mean): --  RR: 18 (25 Jun 2022 06:24) (18 - 20)  SpO2: 100% (25 Jun 2022 06:24) (96% - 100%)    Topamax (Unknown)      PHYSICAL EXAM:  GENERAL: NAD, well-groomed, well-developed  HEAD:  Atraumatic, Normocephalic  EYES: EOMI, PERRLA, conjunctiva and sclera clear  ENMT: No tonsillar erythema, exudates, or enlargement; Moist mucous membranes, Good dentition, No lesions  NECK: Supple, No JVD, Normal thyroid  NERVOUS SYSTEM:  Alert & Oriented X3, Good concentration; Motor Strength 5/5 B/L upper and lower extremities; DTRs 2+ intact and symmetric  CHEST/LUNG: Clear to percussion bilaterally; No rales, rhonchi, wheezing, or rubs  HEART: Regular rate and rhythm; No murmurs, rubs, or gallops  ABDOMEN: Soft, Nontender, Nondistended; Bowel sounds present  EXTREMITIES:  2+ Peripheral Pulses, No clubbing, cyanosis, or edema  LYMPH: No lymphadenopathy noted  SKIN: No rashes or lesions    Consultant(s) Notes Reviewed:  [x ] YES  [ ] NO  Care Discussed with Consultants/Other Providers [ x] YES  [ ] NO    LABS:    CBC Full  -  ( 25 Jun 2022 07:37 )  WBC Count : 6.01 K/uL  RBC Count : 3.43 M/uL  Hemoglobin : 12.0 g/dL  Hematocrit : 37.2 %  Platelet Count - Automated : 221 K/uL  Mean Cell Volume : 108.5 fL  Mean Cell Hemoglobin : 35.0 pg  Mean Cell Hemoglobin Concentration : 32.3 gm/dL  Auto Neutrophil # : x  Auto Lymphocyte # : x  Auto Monocyte # : x  Auto Eosinophil # : x  Auto Basophil # : x  Auto Neutrophil % : x  Auto Lymphocyte % : x  Auto Monocyte % : x  Auto Eosinophil % : x  Auto Basophil % : x    06-25    138  |  98  |  18  ----------------------------<  137<H>  4.0   |  28  |  0.54    Ca    8.7      25 Jun 2022 07:37  Phos  5.2     06-25  Mg     1.80     06-25    TPro  6.9  /  Alb  2.8<L>  /  TBili  0.3  /  DBili  x   /  AST  32  /  ALT  22  /  AlkPhos  87  06-25        POCT  Blood Glucose (06.25.22 @ 06:50)    POCT Blood Glucose.: 90 mg/dL    RADIOLOGY & ADDITIONAL TESTS:    Imaging Personally Reviewed:  [ ] YES  [ ] NO    albuterol/ipratropium for Nebulization 3 milliLiter(s) Nebulizer every 6 hours  calcium carbonate   Suspension 1250 milliGRAM(s) Oral daily  enoxaparin Injectable 40 milliGRAM(s) SubCutaneous every 24 hours  ferrous    sulfate Liquid 300 milliGRAM(s) Oral daily  lacosamide Solution 200 milliGRAM(s) Oral <User Schedule>  lactulose Syrup 15 Gram(s) Oral daily  levETIRAcetam  Solution 500 milliGRAM(s) Oral two times a day  piperacillin/tazobactam IVPB.. 3.375 Gram(s) IV Intermittent every 8 hours  senna Syrup 17.6 milliLiter(s) Oral at bedtime  valproic  acid Syrup 750 milliGRAM(s) Oral three times a day  vancomycin  IVPB 1250 milliGRAM(s) IV Intermittent every 12 hours  vitamin A &amp; D Ointment 1 Application(s) Topical daily      HEALTH ISSUES - PROBLEM Dx:  Positive blood culture    History of airway aspiration    History of asthma    Seizures    On tube feeding diet    Prophylactic measure             Patient is a 57y old  Female who presents with a chief complaint of BACTEREMIA (25 Jun 2022 06:29)      INTERVAL HPI/OVERNIGHT EVENTS: ID on board for gram positive bacteremia. Pt sleeping comfortably. No acute events overnight. PEG tube functioning.       REVIEW OF SYSTEMS:  CONSTITUTIONAL: No fever, weight loss, or fatigue  EYES: No eye pain, visual disturbances, or discharge  ENMT:  No difficulty hearing, tinnitus, vertigo; No sinus or throat pain  NECK: No pain or stiffness  BREASTS: No pain, masses, or nipple discharge  RESPIRATORY: No cough, wheezing, chills or hemoptysis; No shortness of breath  CARDIOVASCULAR: No chest pain, palpitations, dizziness, or leg swelling  GASTROINTESTINAL: No abdominal or epigastric pain. No nausea, vomiting, or hematemesis; No diarrhea or constipation. No melena or hematochezia.  GENITOURINARY: No dysuria, frequency, hematuria, or incontinence  NEUROLOGICAL: No headaches, memory loss, loss of strength, numbness, or tremors  SKIN: No itching, burning, rashes, or lesions   LYMPH NODES: No enlarged glands  ENDOCRINE: No heat or cold intolerance; No hair loss  MUSCULOSKELETAL: No joint pain or swelling; No muscle, back, or extremity pain  PSYCHIATRIC: No depression, anxiety, mood swings, or difficulty sleeping  HEME/LYMPH: No easy bruising, or bleeding gums  ALLERGY AND IMMUNOLOGIC: No hives or eczema    FAMILY HISTORY:      Vital Signs Last 24 Hrs  T(C): 36.1 (25 Jun 2022 06:24), Max: 36.4 (24 Jun 2022 15:30)  T(F): 96.9 (25 Jun 2022 06:24), Max: 97.5 (24 Jun 2022 15:30)  HR: 67 (25 Jun 2022 06:24) (67 - 88)  BP: 109/67 (25 Jun 2022 06:24) (108/74 - 129/104)  BP(mean): --  RR: 18 (25 Jun 2022 06:24) (18 - 20)  SpO2: 100% (25 Jun 2022 06:24) (96% - 100%)    Topamax (Unknown)      PHYSICAL EXAM:  GENERAL: NAD, well-groomed, well-developed  HEAD:  Atraumatic, Normocephalic  EYES: EOMI, PERRLA, conjunctiva and sclera clear  ENMT: No tonsillar erythema, exudates, or enlargement; Moist mucous membranes, Good dentition, No lesions  NECK: Supple, No JVD, Normal thyroid  NERVOUS SYSTEM:  Alert & Oriented X3, Good concentration; Motor Strength 5/5 B/L upper and lower extremities; DTRs 2+ intact and symmetric  CHEST/LUNG: Clear to percussion bilaterally; No rales, rhonchi, wheezing, or rubs  HEART: Regular rate and rhythm; No murmurs, rubs, or gallops  ABDOMEN: Soft, Nontender, Nondistended; Bowel sounds present. PEG tube C/D/I  EXTREMITIES:  2+ Peripheral Pulses, No clubbing, cyanosis, or edema  LYMPH: No lymphadenopathy noted  SKIN: No rashes or lesions    Consultant(s) Notes Reviewed:  [x ] YES  [ ] NO  Care Discussed with Consultants/Other Providers [ x] YES  [ ] NO    LABS:    CBC Full  -  ( 25 Jun 2022 07:37 )  WBC Count : 6.01 K/uL  RBC Count : 3.43 M/uL  Hemoglobin : 12.0 g/dL  Hematocrit : 37.2 %  Platelet Count - Automated : 221 K/uL  Mean Cell Volume : 108.5 fL  Mean Cell Hemoglobin : 35.0 pg  Mean Cell Hemoglobin Concentration : 32.3 gm/dL  Auto Neutrophil # : x  Auto Lymphocyte # : x  Auto Monocyte # : x  Auto Eosinophil # : x  Auto Basophil # : x  Auto Neutrophil % : x  Auto Lymphocyte % : x  Auto Monocyte % : x  Auto Eosinophil % : x  Auto Basophil % : x    06-25    138  |  98  |  18  ----------------------------<  137<H>  4.0   |  28  |  0.54    Ca    8.7      25 Jun 2022 07:37  Phos  5.2     06-25  Mg     1.80     06-25    TPro  6.9  /  Alb  2.8<L>  /  TBili  0.3  /  DBili  x   /  AST  32  /  ALT  22  /  AlkPhos  87  06-25        POCT  Blood Glucose (06.25.22 @ 06:50)    POCT Blood Glucose.: 90 mg/dL    RADIOLOGY & ADDITIONAL TESTS:    Imaging Personally Reviewed:  [ ] YES  [ ] NO    albuterol/ipratropium for Nebulization 3 milliLiter(s) Nebulizer every 6 hours  calcium carbonate   Suspension 1250 milliGRAM(s) Oral daily  enoxaparin Injectable 40 milliGRAM(s) SubCutaneous every 24 hours  ferrous    sulfate Liquid 300 milliGRAM(s) Oral daily  lacosamide Solution 200 milliGRAM(s) Oral <User Schedule>  lactulose Syrup 15 Gram(s) Oral daily  levETIRAcetam  Solution 500 milliGRAM(s) Oral two times a day  piperacillin/tazobactam IVPB.. 3.375 Gram(s) IV Intermittent every 8 hours  senna Syrup 17.6 milliLiter(s) Oral at bedtime  valproic  acid Syrup 750 milliGRAM(s) Oral three times a day  vancomycin  IVPB 1250 milliGRAM(s) IV Intermittent every 12 hours  vitamin A &amp; D Ointment 1 Application(s) Topical daily      HEALTH ISSUES - PROBLEM Dx:  Positive blood culture    History of airway aspiration    History of asthma    Seizures    On tube feeding diet    Prophylactic measure             Patient is a 57y old  Female who presents with a chief complaint of BACTEREMIA (25 Jun 2022 06:29)      INTERVAL HPI/OVERNIGHT EVENTS: ID on board for gram positive bacteremia. Pt sleeping comfortably. No acute events overnight. PEG tube functioning.       REVIEW OF SYSTEMS:  CONSTITUTIONAL: No fever, weight loss, or fatigue  EYES: No eye pain, visual disturbances, or discharge  ENMT:  No difficulty hearing, tinnitus, vertigo; No sinus or throat pain  NECK: No pain or stiffness  BREASTS: No pain, masses, or nipple discharge  RESPIRATORY: No cough, wheezing, chills or hemoptysis; No shortness of breath  CARDIOVASCULAR: No chest pain, palpitations, dizziness, or leg swelling  GASTROINTESTINAL: No abdominal or epigastric pain. No nausea, vomiting, or hematemesis; No diarrhea or constipation. No melena or hematochezia.  GENITOURINARY: No dysuria, frequency, hematuria, or incontinence  NEUROLOGICAL: No headaches, memory loss, loss of strength, numbness, or tremors  SKIN: No itching, burning, rashes, or lesions   LYMPH NODES: No enlarged glands  ENDOCRINE: No heat or cold intolerance; No hair loss  MUSCULOSKELETAL: No joint pain or swelling; No muscle, back, or extremity pain  PSYCHIATRIC: No depression, anxiety, mood swings, or difficulty sleeping  HEME/LYMPH: No easy bruising, or bleeding gums  ALLERGY AND IMMUNOLOGIC: No hives or eczema    FAMILY HISTORY:      Vital Signs Last 24 Hrs  T(C): 36.1 (25 Jun 2022 06:24), Max: 36.4 (24 Jun 2022 15:30)  T(F): 96.9 (25 Jun 2022 06:24), Max: 97.5 (24 Jun 2022 15:30)  HR: 67 (25 Jun 2022 06:24) (67 - 88)  BP: 109/67 (25 Jun 2022 06:24) (108/74 - 129/104)  BP(mean): --  RR: 18 (25 Jun 2022 06:24) (18 - 20)  SpO2: 100% (25 Jun 2022 06:24) (96% - 100%)    Topamax (Unknown)      PHYSICAL EXAM:  GENERAL: NAD, well-groomed, well-developed  HEAD:  Atraumatic, Normocephalic  EYES: EOMI, PERRLA, conjunctiva and sclera clear  ENMT: No tonsillar erythema, exudates, or enlargement; Moist mucous membranes, Good dentition, No lesions  NECK: Supple, No JVD, Normal thyroid  NERVOUS SYSTEM:  Alert & Oriented X3, Good concentration; Motor Strength 5/5 B/L upper and lower extremities; DTRs 2+ intact and symmetric  CHEST/LUNG: Clear to percussion bilaterally; No rales, rhonchi, wheezing, or rubs  HEART: Regular rate and rhythm; No murmurs, rubs, or gallops  ABDOMEN: Soft, Nontender, Nondistended; Bowel sounds present. PEG tube C/D/I  EXTREMITIES:  2+ Peripheral Pulses, No clubbing, cyanosis, or edema  LYMPH: No lymphadenopathy noted  SKIN: No rashes or lesions. Sacral decubitus ulcer.    Consultant(s) Notes Reviewed:  [x ] YES  [ ] NO  Care Discussed with Consultants/Other Providers [ x] YES  [ ] NO    LABS:    CBC Full  -  ( 25 Jun 2022 07:37 )  WBC Count : 6.01 K/uL  RBC Count : 3.43 M/uL  Hemoglobin : 12.0 g/dL  Hematocrit : 37.2 %  Platelet Count - Automated : 221 K/uL  Mean Cell Volume : 108.5 fL  Mean Cell Hemoglobin : 35.0 pg  Mean Cell Hemoglobin Concentration : 32.3 gm/dL  Auto Neutrophil # : x  Auto Lymphocyte # : x  Auto Monocyte # : x  Auto Eosinophil # : x  Auto Basophil # : x  Auto Neutrophil % : x  Auto Lymphocyte % : x  Auto Monocyte % : x  Auto Eosinophil % : x  Auto Basophil % : x    06-25    138  |  98  |  18  ----------------------------<  137<H>  4.0   |  28  |  0.54    Ca    8.7      25 Jun 2022 07:37  Phos  5.2     06-25  Mg     1.80     06-25    TPro  6.9  /  Alb  2.8<L>  /  TBili  0.3  /  DBili  x   /  AST  32  /  ALT  22  /  AlkPhos  87  06-25        POCT  Blood Glucose (06.25.22 @ 06:50)    POCT Blood Glucose.: 90 mg/dL    RADIOLOGY & ADDITIONAL TESTS:    Imaging Personally Reviewed:  [ ] YES  [ ] NO    albuterol/ipratropium for Nebulization 3 milliLiter(s) Nebulizer every 6 hours  calcium carbonate   Suspension 1250 milliGRAM(s) Oral daily  enoxaparin Injectable 40 milliGRAM(s) SubCutaneous every 24 hours  ferrous    sulfate Liquid 300 milliGRAM(s) Oral daily  lacosamide Solution 200 milliGRAM(s) Oral <User Schedule>  lactulose Syrup 15 Gram(s) Oral daily  levETIRAcetam  Solution 500 milliGRAM(s) Oral two times a day  piperacillin/tazobactam IVPB.. 3.375 Gram(s) IV Intermittent every 8 hours  senna Syrup 17.6 milliLiter(s) Oral at bedtime  valproic  acid Syrup 750 milliGRAM(s) Oral three times a day  vancomycin  IVPB 1250 milliGRAM(s) IV Intermittent every 12 hours  vitamin A &amp; D Ointment 1 Application(s) Topical daily      HEALTH ISSUES - PROBLEM Dx:  Positive blood culture    History of airway aspiration    History of asthma    Seizures    On tube feeding diet    Prophylactic measure

## 2022-06-25 NOTE — DIETITIAN INITIAL EVALUATION ADULT - ORAL INTAKE PTA/DIET HISTORY
Patient seen for assessment. Unable to obtain information from patient given patient is non-verbal. Information obtained from RN and chart review.

## 2022-06-25 NOTE — CONSULT NOTE ADULT - ATTENDING COMMENTS
57 year old with CP/seizure disorder    Recently evaluated in ED for cough    1) Positive blood culture  Blood culture + in 2/4 bottles with Staph epi  No indwelling lines or evidence of phlebitis    This may be a contaminant     Discontinue vancomycin  Follow up repeat cultures from 6/24 (sent before antibiotics)      2) Cough   Ct with chronic changes - unchanged  WBC is normal   No fever  stop zosyn    Observe off antibiotics  Follow up repeat cultures 57 year old with CP/seizure disorder    Recently evaluated in ED for cough    1) Positive blood culture  Blood culture + in 2/4 bottles with Staph epi  No indwelling lines or evidence of phlebitis    This may be a contaminant     Discontinue vancomycin  Follow up repeat cultures from 6/24 (sent before antibiotics)      2) Cough   Ct with chronic changes - unchanged  WBC is normal   No fever  stop zosyn    3)Right heel ulcer  eschar in place  no cellulitis, not fluctuant  Wound care     Observe off antibiotics  Follow up repeat cultures

## 2022-06-25 NOTE — PROGRESS NOTE ADULT - PROBLEM SELECTOR PLAN 3
- c/w albuterol and budesonide  - chronic upper airway wheezes - c/w albuterol and budesonide  - chronic upper airway wheezes  -nurse noted wheezing today and paged respiratory for duoneb therapy

## 2022-06-25 NOTE — DIETITIAN INITIAL EVALUATION ADULT - PERTINENT MEDS FT
Detail Level: Detailed Detail Level: Zone MEDICATIONS  (STANDING):  albuterol/ipratropium for Nebulization 3 milliLiter(s) Nebulizer every 6 hours  calcium carbonate   Suspension 1250 milliGRAM(s) Oral daily  enoxaparin Injectable 40 milliGRAM(s) SubCutaneous every 24 hours  ferrous    sulfate Liquid 300 milliGRAM(s) Oral daily  lacosamide Solution 200 milliGRAM(s) Oral <User Schedule>  lactulose Syrup 15 Gram(s) Oral daily  levETIRAcetam  Solution 500 milliGRAM(s) Oral two times a day  piperacillin/tazobactam IVPB.. 3.375 Gram(s) IV Intermittent every 8 hours  senna Syrup 17.6 milliLiter(s) Oral at bedtime  valproic  acid Syrup 750 milliGRAM(s) Oral three times a day  vancomycin  IVPB 1250 milliGRAM(s) IV Intermittent every 12 hours  vitamin A &amp; D Ointment 1 Application(s) Topical daily    MEDICATIONS  (PRN):

## 2022-06-25 NOTE — PROGRESS NOTE ADULT - PROBLEM SELECTOR PLAN 1
- pt did not meet SIRS, but was hypothermic and had + blood clx growing gram + cocci  - c/w vanc considering hx of MRSA, blood clx grows MRSE  - c/w zosyn considering hx of asp PNA growing pseudomonas and a weakly + UA  - f/u blood and urine clx, f/u sensitivity for downgrade  - ECHO to r/o valvular conditions causing bacteremia  - ID will see pt today - pt did not meet SIRS, but was hypothermic and had + blood clx growing gram + cocci  - c/w vanc considering hx of MRSA, blood clx grows MRSE  - c/w zosyn considering hx of asp PNA growing pseudomonas and a weakly + UA  - f/u blood and urine clx, f/u sensitivity for downgrade  - ECHO to r/o valvular conditions causing bacteremia  - appreciate ID recs - pt did not meet SIRS, but was hypothermic and had + blood clx growing gram + cocci  - Source likely sacral decubitus ulcer, per ID. Afebrile with no leukocytosis. Imaging negative and UA negative for source of infection  - c/w vanc considering hx of MRSA, blood clx grows MRSE  - c/w zosyn considering hx of asp PNA growing pseudomonas and a weakly + UA  - f/u blood and urine clx, f/u sensitivity for downgrade  - ECHO to r/o valvular conditions causing bacteremia  - appreciate ID recs

## 2022-06-25 NOTE — DIETITIAN INITIAL EVALUATION ADULT - NS FNS DIET ORDER
Diet, NPO with Tube Feed:   Tube Feeding Modality: Gastrostomy  Jevity 1.2 Shady (JEVITY1.2RTH)  Total Volume for 24 Hours (mL): 1320  Continuous  Starting Tube Feed Rate {mL per Hour}: 10  Increase Tube Feed Rate by (mL): 10     Every 6 hours  Until Goal Tube Feed Rate (mL per Hour): 55  Tube Feed Duration (in Hours): 24  Tube Feed Start Time: 17:00 (06-24-22 @ 16:38)

## 2022-06-25 NOTE — DIETITIAN INITIAL EVALUATION ADULT - OTHER INFO
57 year old female with a PMH of cerebral palsy, seizure d/o, bedbound at baseline, frequent aspiration PNA and UTI, presented to the ED today due to bacteremia growing gram + cocci per chart.    Patient is currently on Jevity 1.2 via GT @ 55 mL/hr. x 24 hrs. for total volume 1,320 mL. Provides 1,584 kcal, 73.3 g pro and 1,065 mL of fluid (TF free water). Per RN, no GI distress reported at this time. No food allergies per chart. Unable to obtain UBW at this time. Per previous RD note (5/13/22) noted with weight 92.7 kg along with 3+/4+/non-pitting edema to extremities. ABW is 92.3 kg (6/24) per chart and is noted with +2 L/R foot edema per RN flow sheet, will monitor. Noted with R heel unstageable pressure injury per RN flow sheet.

## 2022-06-25 NOTE — DIETITIAN INITIAL EVALUATION ADULT - ADD RECOMMEND
Tube feed formula is concentrated so monitor signs/symptoms of dehydration. Obtain weekly weight to monitor trend.

## 2022-06-25 NOTE — DIETITIAN INITIAL EVALUATION ADULT - NSFNSGIIOFT_GEN_A_CORE
06-24-22 @ 07:01  -  06-25-22 @ 07:00  --------------------------------------------------------  OUT:  Total OUT: 0 mL    Total NET: 100 mL

## 2022-06-25 NOTE — DIETITIAN INITIAL EVALUATION ADULT - PERTINENT LABORATORY DATA
06-25    138  |  98  |  18  ----------------------------<  137<H>  4.0   |  28  |  0.54    Ca    8.7      25 Jun 2022 07:37  Phos  5.2     06-25  Mg     1.80     06-25    TPro  6.9  /  Alb  2.8<L>  /  TBili  0.3  /  DBili  x   /  AST  32  /  ALT  22  /  AlkPhos  87  06-25  POCT Blood Glucose.: 90 mg/dL (06-25-22 @ 06:50)  A1C with Estimated Average Glucose Result: 4.9 % (05-11-22 @ 05:30)  A1C with Estimated Average Glucose Result: 5.1 % (02-05-22 @ 15:24)

## 2022-06-25 NOTE — DIETITIAN INITIAL EVALUATION ADULT - NSFNSPHYEXAMSKINFT_GEN_A_CORE
Pressure Injury 1: Right:,heel, Unstageable  Pressure Injury 2: none, none  Pressure Injury 3: none, none  Pressure Injury 4: none, none  Pressure Injury 5: none, none  Pressure Injury 6: none, none  Pressure Injury 7: none, none  Pressure Injury 8: none, none  Pressure Injury 9: none, none  Pressure Injury 10: none, none  Pressure Injury 11: none, none

## 2022-06-25 NOTE — PATIENT PROFILE ADULT - FALL HARM RISK - HARM RISK INTERVENTIONS

## 2022-06-25 NOTE — CONSULT NOTE ADULT - SUBJECTIVE AND OBJECTIVE BOX
Patient is a 57y old  Female who presents with a chief complaint of BACTEREMIA (25 Jun 2022 06:29)    HPI: Ms Ponce is a 57 year old lady with PMH cerebral palsy, seizure d/o, bedbound at baseline, frequent aspiration PNA (pseudomonas and MRSA in sputum) and UTI, presented to the ED today due to bacteremia growing gram + cocci. Pt initially presented to the ED due to hemoptysis w/ streaks of blood desatting to 87% yesterday. She was later dc'd yesterday after she was observed to be VSS and asymptomatic and CTA chest was negative for PE. However her long term facility ([facility] [place]) sent her back because her blood clx drawn in ED grew gram + cocci. THe Nursing facility RN reported that pt has always been wheezing and has been receiving albuterol and budesonide via mask. She has been taking bactrim due to frequent UTI. She has a distended belly at her baseline and a stage 3 ulcer following by wound care. In the ED, pt was hypothermic 95.7, no leukocytosis, blood cx drawn and pt was started on vanc and zosyn. ID consulted for the same.       REVIEW OF SYSTEMS  [  ] ROS unobtainable because:    [ x ] All other systems negative except as noted below    Constitutional:  [ ] fever [ ] chills  [ ] weight loss  [ ]night sweat  [ ]poor appetite/PO intake [ ]fatigue   Skin:  [ ] rash [ ] phlebitis	  Eyes: [ ] icterus [ ] pain  [ ] discharge	  ENMT: [ ] sore throat  [ ] thrush [ ] ulcers [ ] exudates [ ]anosmia  Respiratory: [ ] dyspnea [ ] hemoptysis [ ] cough [ ] sputum	  Cardiovascular:  [ ] chest pain [ ] palpitations [ ] edema	  Gastrointestinal:  [ ] nausea [ ] vomiting [ ] diarrhea [ ] constipation [ ] pain	  Genitourinary:  [ ] dysuria [ ] frequency [ ] hematuria [ ] discharge [ ] flank pain  [ ] incontinence  Musculoskeletal:  [ ] myalgias [ ] arthralgias [ ] arthritis  [ ] back pain  Neurological:  [ ] headache [ ] weakness [ ] seizures  [ ] confusion/altered mental status    prior hospital charts reviewed [V]  primary team notes reviewed [V]  other consultant notes reviewed [V]    PAST MEDICAL & SURGICAL HISTORY:  Cerebral palsy  Seizure disorder  Constipation  Intellectual disability  Asthma  Gastrostomy tube dependent    SOCIAL HISTORY:  - Denied smoking/vaping/alcohol/recreational drug use    FAMILY HISTORY:      Allergies  Topamax (Unknown)        ANTIMICROBIALS:  piperacillin/tazobactam IVPB.. 3.375 every 8 hours  vancomycin  IVPB 1250 every 12 hours      ANTIMICROBIALS (past 90 days):  MEDICATIONS  (STANDING):  piperacillin/tazobactam IVPB..   25 mL/Hr IV Intermittent (06-25-22 @ 01:40)    piperacillin/tazobactam IVPB...   200 mL/Hr IV Intermittent (06-24-22 @ 15:23)    vancomycin  IVPB   166.67 mL/Hr IV Intermittent (06-25-22 @ 06:30)    vancomycin  IVPB.   250 mL/Hr IV Intermittent (06-24-22 @ 16:39)        OTHER MEDS:   MEDICATIONS  (STANDING):  albuterol/ipratropium for Nebulization 3 every 6 hours  enoxaparin Injectable 40 every 24 hours  lacosamide Solution 200 <User Schedule>  lactulose Syrup 15 daily  levETIRAcetam  Solution 500 two times a day  senna Syrup 17.6 at bedtime  valproic  acid Syrup 750 three times a day      VITALS:  Vital Signs Last 24 Hrs  T(F): 96.9 (06-25-22 @ 06:24), Max: 97.9 (06-22-22 @ 18:44)    Vital Signs Last 24 Hrs  HR: 67 (06-25-22 @ 06:24) (67 - 88)  BP: 109/67 (06-25-22 @ 06:24) (108/74 - 129/104)  RR: 18 (06-25-22 @ 06:24)  SpO2: 100% (06-25-22 @ 06:24) (96% - 100%)  Wt(kg): --    EXAM:    GA: NAD, AOx3  HEENT: oral cavity no lesion  CV: nl S1/S2, no RMG  Lungs: CTAB, No distress  Abd: BS+, soft, nontender, no rebounding pain  Ext: no edema  Neuro: No focal deficits  Skin: Intact  IV: no phlebitis    Labs:                        12.0   6.01  )-----------( 221      ( 25 Jun 2022 07:37 )             37.2     06-25    138  |  98  |  18  ----------------------------<  137<H>  4.0   |  28  |  0.54    Ca    8.7      25 Jun 2022 07:37  Phos  5.2     06-25  Mg     1.80     06-25    TPro  6.9  /  Alb  2.8<L>  /  TBili  0.3  /  DBili  x   /  AST  32  /  ALT  22  /  AlkPhos  87  06-25      WBC Trend:  WBC Count: 6.01 (06-25-22 @ 07:37)  WBC Count: 6.06 (06-24-22 @ 12:00)  WBC Count: 6.31 (06-23-22 @ 03:48)      Auto Neutrophil #: 3.55 K/uL (06-24-22 @ 12:00)  Auto Neutrophil #: 3.44 K/uL (06-23-22 @ 03:48)  Auto Neutrophil #: 8.63 K/uL (05-11-22 @ 05:30)  Auto Neutrophil #: 8.54 K/uL (05-10-22 @ 18:00)  Auto Neutrophil #: 4.07 K/uL (05-01-22 @ 06:08)      Creatine Trend:  Creatinine, Serum: 0.54 (06-25)  Creatinine, Serum: 0.44 (06-24)  Creatinine, Serum: 0.48 (06-23)      Liver Biochemical Testing Trend:  Alanine Aminotransferase (ALT/SGPT): 22 (06-25)  Alanine Aminotransferase (ALT/SGPT): 25 (06-24)  Alanine Aminotransferase (ALT/SGPT): 20 (06-23)  Alanine Aminotransferase (ALT/SGPT): 23 (05-10)  Alanine Aminotransferase (ALT/SGPT): 26 (05-02)  Aspartate Aminotransferase (AST/SGOT): 32 (06-25-22 @ 07:37)  Aspartate Aminotransferase (AST/SGOT): 45 (06-24-22 @ 12:00)  Aspartate Aminotransferase (AST/SGOT): 47 (06-23-22 @ 03:48)  Aspartate Aminotransferase (AST/SGOT): 63 (05-10-22 @ 18:00)  Aspartate Aminotransferase (AST/SGOT): 66 (05-02-22 @ 07:03)  Bilirubin Total, Serum: 0.3 (06-25)  Bilirubin Total, Serum: 0.3 (06-24)  Bilirubin Total, Serum: 0.4 (06-23)  Bilirubin Total, Serum: 0.4 (05-10)  Bilirubin Total, Serum: 0.2 (05-02)      Trend LDH      Auto Eosinophil %: 7.6 % (06-24-22 @ 12:00)  Auto Eosinophil %: 5.9 % (06-23-22 @ 03:48)      Urinalysis Basic - ( 24 Jun 2022 12:30 )    Color: Yellow / Appearance: Slightly Turbid / SG: >1.040 / pH: x  Gluc: x / Ketone: Small  / Bili: Negative / Urobili: <2 mg/dL   Blood: x / Protein: 100 mg/dL / Nitrite: Negative   Leuk Esterase: Large / RBC: x / WBC 5-10 /HPF   Sq Epi: x / Non Sq Epi: Moderate / Bacteria: x        MICROBIOLOGY:        Culture - Urine (collected 23 Jun 2022 04:20)  Source: Catheterized Catheterized  Final Report:    No growth    Culture - Blood (collected 23 Jun 2022 03:40)  Source: .Blood Blood-Venous  Preliminary Report:    Growth in anaerobic bottle: Gram Positive Cocci in Clusters    ***Blood Panel PCR results on this specimen are available    approximately 3 hours after the Gram stain result.***    Gram stain, PCR, and/or culture results may not always    correspond due todifference in methodologies.    ************************************************************    This PCR assay was performed by multiplex PCR. This    Assay tests for 66 bacterial and resistance gene targets.    Please refer to the [facility] testdirectory    at [email or web address] for details.  Organism: Blood Culture PCR  Organism: Blood Culture PCR    Sensitivities:      -  Staphylococcus epidermidis, Methicillin resistant: Detec      Method Type: PCR    Culture - Blood (collected 23 Jun 2022 03:40)  Source: .Blood Blood-Peripheral  Preliminary Report:    Growth in anaerobic bottle: Gram Positive Cocci in Clusters    Culture - Blood (collected 13 May 2022 16:16)  Source: .Blood Blood-Peripheral  Final Report:    No Growth Final    Culture - Blood (collected 13 May 2022 16:00)  Source: .Blood Blood-Peripheral  Final Report:    No Growth Final    Culture - Blood (collected 11 May 2022 14:00)  Source: .Blood Blood  Final Report:    Growth in aerobic bottle: Staphylococcus epidermidis    Coag Negative Staphylococcus    Single set isolate, possible contaminant. Contact    Microbiology if susceptibility testing clinically    indicated.    ***Blood Panel PCR results on this specimen are available    approximately 3 hours after the Gram stain result.***    Gram stain, PCR, and/or culture results may not always    correspond due to difference in methodologies.    ************************************************************    This PCR assay was performed by multiplex PCR. This    Assay tests for 66 bacterial and resistance gene targets.    Please refer to the [facility] test directory    at [email or web address] for details.  Organism: Blood Culture PCR  Organism: Blood Culture PCR    Sensitivities:      -  Staphylococcus epidermidis, Methicillin resistant: Detec      Method Type: PCR      Rapid RVP Result: NotDetec (06-24 @ 15:22)  Rapid RVP Result: NotDetec (06-23 @ 01:20)    RADIOLOGY:  imaging below personally reviewed    < from: CT Angio Chest PE Protocol w/ IV Cont (06.23.22 @ 10:17) >  1.  No pulmonary embolism.    < end of copied text >    < from: Xray Chest 1 View AP/PA (06.24.22 @ 12:45) >  IMPRESSION:  Clear lungs.    < end of copied text >    < from: CT Abdomen and Pelvis w/ IV Cont (06.24.22 @ 14:40) >  No evidence of acute abnormality in the abdomen and pelvis.    < end of copied text >   Patient is a 57y old  Female who presents with a chief complaint of BACTEREMIA (25 Jun 2022 06:29)    HPI: Ms Ponce is a 57 year old lady with PMH cerebral palsy, seizure d/o, bedbound at baseline, frequent aspiration PNA (pseudomonas and MRSA in sputum) and UTI, presented to the ED today due to bacteremia growing gram + cocci. Pt initially presented to the ED due to hemoptysis w/ streaks of blood desatting to 87% yesterday. She was later dc'd yesterday after she was observed to be VSS and asymptomatic and CTA chest was negative for PE. However her long term facility ([facility] [place]) sent her back because her blood clx drawn in ED grew gram + cocci. THe Nursing facility RN reported that pt has always been wheezing and has been receiving albuterol and budesonide via mask. She has been taking bactrim due to frequent UTI. She has a distended belly at her baseline and a stage 3 ulcer following by wound care. In the ED, pt was hypothermic 95.7, no leukocytosis, blood cx drawn and pt was started on vanc and zosyn. ID consulted for the same.       REVIEW OF SYSTEMS  [ x] ROS unobtainable because:  Cerebral Palsy  [  ] All other systems negative except as noted below    Constitutional:  [ ] fever [ ] chills  [ ] weight loss  [ ]night sweat  [ ]poor appetite/PO intake [ ]fatigue   Skin:  [ ] rash [ ] phlebitis	  Eyes: [ ] icterus [ ] pain  [ ] discharge	  ENMT: [ ] sore throat  [ ] thrush [ ] ulcers [ ] exudates [ ]anosmia  Respiratory: [ ] dyspnea [ ] hemoptysis [ ] cough [ ] sputum	  Cardiovascular:  [ ] chest pain [ ] palpitations [ ] edema	  Gastrointestinal:  [ ] nausea [ ] vomiting [ ] diarrhea [ ] constipation [ ] pain	  Genitourinary:  [ ] dysuria [ ] frequency [ ] hematuria [ ] discharge [ ] flank pain  [ ] incontinence  Musculoskeletal:  [ ] myalgias [ ] arthralgias [ ] arthritis  [ ] back pain  Neurological:  [ ] headache [ ] weakness [ ] seizures  [ ] confusion/altered mental status    prior hospital charts reviewed [V]  primary team notes reviewed [V]  other consultant notes reviewed [V]    PAST MEDICAL & SURGICAL HISTORY:  Cerebral palsy  Seizure disorder  Constipation  Intellectual disability  Asthma  Gastrostomy tube dependent    SOCIAL HISTORY:  - Denied smoking/vaping/alcohol/recreational drug use    FAMILY HISTORY:      Allergies  Topamax (Unknown)        ANTIMICROBIALS:  piperacillin/tazobactam IVPB.. 3.375 every 8 hours  vancomycin  IVPB 1250 every 12 hours      ANTIMICROBIALS (past 90 days):  MEDICATIONS  (STANDING):  piperacillin/tazobactam IVPB..   25 mL/Hr IV Intermittent (06-25-22 @ 01:40)    piperacillin/tazobactam IVPB...   200 mL/Hr IV Intermittent (06-24-22 @ 15:23)    vancomycin  IVPB   166.67 mL/Hr IV Intermittent (06-25-22 @ 06:30)    vancomycin  IVPB.   250 mL/Hr IV Intermittent (06-24-22 @ 16:39)        OTHER MEDS:   MEDICATIONS  (STANDING):  albuterol/ipratropium for Nebulization 3 every 6 hours  enoxaparin Injectable 40 every 24 hours  lacosamide Solution 200 <User Schedule>  lactulose Syrup 15 daily  levETIRAcetam  Solution 500 two times a day  senna Syrup 17.6 at bedtime  valproic  acid Syrup 750 three times a day      VITALS:  Vital Signs Last 24 Hrs  T(F): 96.9 (06-25-22 @ 06:24), Max: 97.9 (06-22-22 @ 18:44)    Vital Signs Last 24 Hrs  HR: 67 (06-25-22 @ 06:24) (67 - 88)  BP: 109/67 (06-25-22 @ 06:24) (108/74 - 129/104)  RR: 18 (06-25-22 @ 06:24)  SpO2: 100% (06-25-22 @ 06:24) (96% - 100%)  Wt(kg): --    EXAM:    Constitutional: Not in acute distress  Eyes: pupils bilaterally reactive to light. No icterus.  Oral cavity: Clear, no lesions  Neck: No neck vein distension noted  RS: Chest clear to auscultation bilaterally. crepitations +  CVS: S1, S2 heard. Regular rate and rhythm. No murmurs/rubs/gallops.  Abdomen: Soft. No guarding/rigidity/tenderness., PEG  : No acute abnormalities  Extremities: Warm. No pedal edema  Skin: Right heel necrotic wound w.o bogginess or purulence  Vascular: No evidence of phlebitis  Neuro: Alert, cooperative  Cranial nerves 2-12 grossly normal. No focal abnormalities    Labs:                        12.0   6.01  )-----------( 221      ( 25 Jun 2022 07:37 )             37.2     06-25    138  |  98  |  18  ----------------------------<  137<H>  4.0   |  28  |  0.54    Ca    8.7      25 Jun 2022 07:37  Phos  5.2     06-25  Mg     1.80     06-25    TPro  6.9  /  Alb  2.8<L>  /  TBili  0.3  /  DBili  x   /  AST  32  /  ALT  22  /  AlkPhos  87  06-25      WBC Trend:  WBC Count: 6.01 (06-25-22 @ 07:37)  WBC Count: 6.06 (06-24-22 @ 12:00)  WBC Count: 6.31 (06-23-22 @ 03:48)      Auto Neutrophil #: 3.55 K/uL (06-24-22 @ 12:00)  Auto Neutrophil #: 3.44 K/uL (06-23-22 @ 03:48)  Auto Neutrophil #: 8.63 K/uL (05-11-22 @ 05:30)  Auto Neutrophil #: 8.54 K/uL (05-10-22 @ 18:00)  Auto Neutrophil #: 4.07 K/uL (05-01-22 @ 06:08)      Creatine Trend:  Creatinine, Serum: 0.54 (06-25)  Creatinine, Serum: 0.44 (06-24)  Creatinine, Serum: 0.48 (06-23)      Liver Biochemical Testing Trend:  Alanine Aminotransferase (ALT/SGPT): 22 (06-25)  Alanine Aminotransferase (ALT/SGPT): 25 (06-24)  Alanine Aminotransferase (ALT/SGPT): 20 (06-23)  Alanine Aminotransferase (ALT/SGPT): 23 (05-10)  Alanine Aminotransferase (ALT/SGPT): 26 (05-02)  Aspartate Aminotransferase (AST/SGOT): 32 (06-25-22 @ 07:37)  Aspartate Aminotransferase (AST/SGOT): 45 (06-24-22 @ 12:00)  Aspartate Aminotransferase (AST/SGOT): 47 (06-23-22 @ 03:48)  Aspartate Aminotransferase (AST/SGOT): 63 (05-10-22 @ 18:00)  Aspartate Aminotransferase (AST/SGOT): 66 (05-02-22 @ 07:03)  Bilirubin Total, Serum: 0.3 (06-25)  Bilirubin Total, Serum: 0.3 (06-24)  Bilirubin Total, Serum: 0.4 (06-23)  Bilirubin Total, Serum: 0.4 (05-10)  Bilirubin Total, Serum: 0.2 (05-02)      Trend LDH      Auto Eosinophil %: 7.6 % (06-24-22 @ 12:00)  Auto Eosinophil %: 5.9 % (06-23-22 @ 03:48)      Urinalysis Basic - ( 24 Jun 2022 12:30 )    Color: Yellow / Appearance: Slightly Turbid / SG: >1.040 / pH: x  Gluc: x / Ketone: Small  / Bili: Negative / Urobili: <2 mg/dL   Blood: x / Protein: 100 mg/dL / Nitrite: Negative   Leuk Esterase: Large / RBC: x / WBC 5-10 /HPF   Sq Epi: x / Non Sq Epi: Moderate / Bacteria: x        MICROBIOLOGY:        Culture - Urine (collected 23 Jun 2022 04:20)  Source: Catheterized Catheterized  Final Report:    No growth    Culture - Blood (collected 23 Jun 2022 03:40)  Source: .Blood Blood-Venous  Preliminary Report:    Growth in anaerobic bottle: Gram Positive Cocci in Clusters    ***Blood Panel PCR results on this specimen are available    approximately 3 hours after the Gram stain result.***    Gram stain, PCR, and/or culture results may not always    correspond due todifference in methodologies.    ************************************************************    This PCR assay was performed by multiplex PCR. This    Assay tests for 66 bacterial and resistance gene targets.    Please refer to the [facility] testdirectory    at [email or web address] for details.  Organism: Blood Culture PCR  Organism: Blood Culture PCR    Sensitivities:      -  Staphylococcus epidermidis, Methicillin resistant: Detec      Method Type: PCR    Culture - Blood (collected 23 Jun 2022 03:40)  Source: .Blood Blood-Peripheral  Preliminary Report:    Growth in anaerobic bottle: Gram Positive Cocci in Clusters    Culture - Blood (collected 13 May 2022 16:16)  Source: .Blood Blood-Peripheral  Final Report:    No Growth Final    Culture - Blood (collected 13 May 2022 16:00)  Source: .Blood Blood-Peripheral  Final Report:    No Growth Final    Culture - Blood (collected 11 May 2022 14:00)  Source: .Blood Blood  Final Report:    Growth in aerobic bottle: Staphylococcus epidermidis    Coag Negative Staphylococcus    Single set isolate, possible contaminant. Contact    Microbiology if susceptibility testing clinically    indicated.    ***Blood Panel PCR results on this specimen are available    approximately 3 hours after the Gram stain result.***    Gram stain, PCR, and/or culture results may not always    correspond due to difference in methodologies.    ************************************************************    This PCR assay was performed by multiplex PCR. This    Assay tests for 66 bacterial and resistance gene targets.    Please refer to the [facility] test directory    at [email or web address] for details.  Organism: Blood Culture PCR  Organism: Blood Culture PCR    Sensitivities:      -  Staphylococcus epidermidis, Methicillin resistant: Detec      Method Type: PCR      Rapid RVP Result: NotDetec (06-24 @ 15:22)  Rapid RVP Result: NotDetec (06-23 @ 01:20)    RADIOLOGY:  imaging below personally reviewed    < from: CT Angio Chest PE Protocol w/ IV Cont (06.23.22 @ 10:17) >  1.  No pulmonary embolism.    < end of copied text >    < from: Xray Chest 1 View AP/PA (06.24.22 @ 12:45) >  IMPRESSION:  Clear lungs.    < end of copied text >    < from: CT Abdomen and Pelvis w/ IV Cont (06.24.22 @ 14:40) >  No evidence of acute abnormality in the abdomen and pelvis.    < end of copied text >

## 2022-06-25 NOTE — CONSULT NOTE ADULT - ASSESSMENT
Ms Ponce is a 57 year old lady with PMH cerebral palsy, seizure d/o, bedbound at baseline, frequent aspiration PNA (pseudomonas and MRSA in sputum) and UTI, presented to the ED today due to bacteremia growing gram + cocci. Pt initially presented to the ED due to hemoptysis w/ streaks of blood desatting to 87% yesterday. She was later dc'd yesterday after she was observed to be VSS and asymptomatic and CTA chest was negative for PE. However her long term facility ([facility]) sent her back because her blood clx drawn in ED grew gram + cocci. THe Nursing facility RN reported that pt has always been wheezing and has been receiving albuterol and budesonide via mask. She has been taking bactrim due to frequent UTI. She has a distended belly at her baseline and a stage 3 ulcer following by wound care. In the ED, pt was hypothermic 95.7, no leukocytosis, blood cx drawn and pt was started on vanc and zosyn. ID consulted for the same.      WORKUP  Blood cx (6/23): MRSE in anaerobic bottles in both sets (2/4)  UA and Urine cx (6/23): Negative  CT Angio Chest PE Protocol w/ IV Cont (06.23.22):  No pulmonary embolism.  Xray Chest (06.24) Clear lungs.  CT Abdomen and Pelvis w/ IV Cont (06.24): No evidence of acute abnormality in the abdomen and pelvis.    DIAGNOSIS and IMPRESSION  # MRSE Bacteremia  # Sacral Decubitus Ulcer  Afebrile with no leukocytosis  Pt is on Zosyn 3.375 every 8 hours and vanc  IVPB 1250 every 12 hours (6/24 - )  Imaging negative and UA negative for source of infection    RECOMMENDATIONS  Wound care consult for sacral decubitus ulcer        PT TO BE SEEN. PRELIM NOTE  PENDING RECS. PLEASE WAIT FOR FINAL RECS AFTER DISCUSSION WITH ATTENDING#    Sancho García MD, PGY4   ID fellow  [employer] Preferred  After 5pm/weekends call [phone]   Ms Ponce is a 57 year old lady with PMH cerebral palsy, seizure d/o, bedbound at baseline, frequent aspiration PNA (pseudomonas and MRSA in sputum) and UTI, presented to the ED today due to bacteremia growing gram + cocci. Pt initially presented to the ED due to hemoptysis w/ streaks of blood desatting to 87% yesterday. She was later dc'd yesterday after she was observed to be VSS and asymptomatic and CTA chest was negative for PE. However her long term facility ([facility] [place] sent her back because her blood clx drawn in ED grew gram + cocci. THe Nursing facility RN reported that pt has always been wheezing and has been receiving albuterol and budesonide via mask. She has been taking bactrim due to frequent UTI. She has a distended belly at her baseline and a stage 3 ulcer following by wound care. In the ED, pt was hypothermic 95.7, no leukocytosis, blood cx drawn and pt was started on vanc and zosyn. ID consulted for the same.      WORKUP  Blood cx (6/23): MRSE in anaerobic bottles in both sets (2/4)  UA and Urine cx (6/23): Negative  CT Angio Chest PE Protocol w/ IV Cont (06.23.22):  No pulmonary embolism.  Xray Chest (06.24) Clear lungs.  CT Abdomen and Pelvis w/ IV Cont (06.24): No evidence of acute abnormality in the abdomen and pelvis.    DIAGNOSIS and IMPRESSION  # MRSE Bacteremia  # Cough  Afebrile with no leukocytosis  Pt is on Zosyn 3.375 every 8 hours and vanc  IVPB 1250 every 12 hours (6/24 - )  Imaging negative and UA negative for source of infection  Recent CT chest w./o acute findings,   MRSE possible contaminant    RECOMMENDATIONS  Wound care consult for sacral decubitus ulcer  Stop all abx  Repeat blood cx on admission appear to be drawn prior to abx usage -> Will f/u  Trend WBC and Monitor for fevers    Pt seen and examined. Case d/w attending and primary team    Sancho García MD, PGY4   ID fellow  [employer] Teams Preferred  After 5pm/weekends call [phone]

## 2022-06-26 LAB
-  AMPICILLIN/SULBACTAM: SIGNIFICANT CHANGE UP
-  CEFAZOLIN: SIGNIFICANT CHANGE UP
-  CLINDAMYCIN: SIGNIFICANT CHANGE UP
-  ERYTHROMYCIN: SIGNIFICANT CHANGE UP
-  GENTAMICIN: SIGNIFICANT CHANGE UP
-  OXACILLIN: SIGNIFICANT CHANGE UP
-  PENICILLIN: SIGNIFICANT CHANGE UP
-  RIFAMPIN: SIGNIFICANT CHANGE UP
-  TETRACYCLINE: SIGNIFICANT CHANGE UP
-  TRIMETHOPRIM/SULFAMETHOXAZOLE: SIGNIFICANT CHANGE UP
-  VANCOMYCIN: SIGNIFICANT CHANGE UP
ANION GAP SERPL CALC-SCNC: 11 MMOL/L — SIGNIFICANT CHANGE UP (ref 7–14)
BUN SERPL-MCNC: 13 MG/DL — SIGNIFICANT CHANGE UP (ref 7–23)
CALCIUM SERPL-MCNC: 8.6 MG/DL — SIGNIFICANT CHANGE UP (ref 8.4–10.5)
CHLORIDE SERPL-SCNC: 97 MMOL/L — LOW (ref 98–107)
CO2 SERPL-SCNC: 30 MMOL/L — SIGNIFICANT CHANGE UP (ref 22–31)
CREAT SERPL-MCNC: 0.5 MG/DL — SIGNIFICANT CHANGE UP (ref 0.5–1.3)
CULTURE RESULTS: SIGNIFICANT CHANGE UP
EGFR: 110 ML/MIN/1.73M2 — SIGNIFICANT CHANGE UP
GLUCOSE SERPL-MCNC: 135 MG/DL — HIGH (ref 70–99)
HCT VFR BLD CALC: 34.3 % — LOW (ref 34.5–45)
HGB BLD-MCNC: 11.2 G/DL — LOW (ref 11.5–15.5)
MAGNESIUM SERPL-MCNC: 1.8 MG/DL — SIGNIFICANT CHANGE UP (ref 1.6–2.6)
MCHC RBC-ENTMCNC: 32.7 GM/DL — SIGNIFICANT CHANGE UP (ref 32–36)
MCHC RBC-ENTMCNC: 35.3 PG — HIGH (ref 27–34)
MCV RBC AUTO: 108.2 FL — HIGH (ref 80–100)
METHOD TYPE: SIGNIFICANT CHANGE UP
NRBC # BLD: 0 /100 WBCS — SIGNIFICANT CHANGE UP
NRBC # FLD: 0.03 K/UL — HIGH
ORGANISM # SPEC MICROSCOPIC CNT: SIGNIFICANT CHANGE UP
ORGANISM # SPEC MICROSCOPIC CNT: SIGNIFICANT CHANGE UP
PHOSPHATE SERPL-MCNC: 4.8 MG/DL — HIGH (ref 2.5–4.5)
PLATELET # BLD AUTO: 257 K/UL — SIGNIFICANT CHANGE UP (ref 150–400)
POTASSIUM SERPL-MCNC: 4.2 MMOL/L — SIGNIFICANT CHANGE UP (ref 3.5–5.3)
POTASSIUM SERPL-SCNC: 4.2 MMOL/L — SIGNIFICANT CHANGE UP (ref 3.5–5.3)
RBC # BLD: 3.17 M/UL — LOW (ref 3.8–5.2)
RBC # FLD: 15.9 % — HIGH (ref 10.3–14.5)
SODIUM SERPL-SCNC: 138 MMOL/L — SIGNIFICANT CHANGE UP (ref 135–145)
SPECIMEN SOURCE: SIGNIFICANT CHANGE UP
WBC # BLD: 3.88 K/UL — SIGNIFICANT CHANGE UP (ref 3.8–10.5)
WBC # FLD AUTO: 3.88 K/UL — SIGNIFICANT CHANGE UP (ref 3.8–10.5)

## 2022-06-26 PROCEDURE — 99232 SBSQ HOSP IP/OBS MODERATE 35: CPT

## 2022-06-26 PROCEDURE — 99233 SBSQ HOSP IP/OBS HIGH 50: CPT | Mod: GC

## 2022-06-26 RX ORDER — LACOSAMIDE 50 MG/1
200 TABLET ORAL
Refills: 0 | Status: DISCONTINUED | OUTPATIENT
Start: 2022-06-26 | End: 2022-06-27

## 2022-06-26 RX ADMIN — Medication 3 MILLILITER(S): at 16:02

## 2022-06-26 RX ADMIN — Medication 3 MILLILITER(S): at 21:41

## 2022-06-26 RX ADMIN — Medication 750 MILLIGRAM(S): at 15:07

## 2022-06-26 RX ADMIN — LACOSAMIDE 200 MILLIGRAM(S): 50 TABLET ORAL at 15:06

## 2022-06-26 RX ADMIN — LACOSAMIDE 200 MILLIGRAM(S): 50 TABLET ORAL at 05:20

## 2022-06-26 RX ADMIN — SENNA PLUS 17.6 MILLILITER(S): 8.6 TABLET ORAL at 21:53

## 2022-06-26 RX ADMIN — Medication 3 MILLILITER(S): at 10:23

## 2022-06-26 RX ADMIN — Medication 1250 MILLIGRAM(S): at 15:06

## 2022-06-26 RX ADMIN — LEVETIRACETAM 500 MILLIGRAM(S): 250 TABLET, FILM COATED ORAL at 05:20

## 2022-06-26 RX ADMIN — Medication 3 MILLILITER(S): at 03:43

## 2022-06-26 RX ADMIN — Medication 750 MILLIGRAM(S): at 22:41

## 2022-06-26 RX ADMIN — Medication 300 MILLIGRAM(S): at 12:46

## 2022-06-26 RX ADMIN — LACTULOSE 15 GRAM(S): 10 SOLUTION ORAL at 12:45

## 2022-06-26 RX ADMIN — LACOSAMIDE 200 MILLIGRAM(S): 50 TABLET ORAL at 21:53

## 2022-06-26 RX ADMIN — Medication 750 MILLIGRAM(S): at 05:21

## 2022-06-26 RX ADMIN — LEVETIRACETAM 500 MILLIGRAM(S): 250 TABLET, FILM COATED ORAL at 17:15

## 2022-06-26 RX ADMIN — ENOXAPARIN SODIUM 40 MILLIGRAM(S): 100 INJECTION SUBCUTANEOUS at 17:15

## 2022-06-26 NOTE — PROGRESS NOTE ADULT - SUBJECTIVE AND OBJECTIVE BOX
Patient is a 57y old  Female who presents with a chief complaint of Bacteremia     (25 Jun 2022 11:54)      SUBJECTIVE / OVERNIGHT EVENTS:    12 point ROS negative except as noted above.     MEDICATIONS  (STANDING):  albuterol/ipratropium for Nebulization 3 milliLiter(s) Nebulizer every 6 hours  calcium carbonate   Suspension 1250 milliGRAM(s) Oral daily  enoxaparin Injectable 40 milliGRAM(s) SubCutaneous every 24 hours  ferrous    sulfate Liquid 300 milliGRAM(s) Oral daily  lacosamide Solution 200 milliGRAM(s) Oral <User Schedule>  lactulose Syrup 15 Gram(s) Oral daily  levETIRAcetam  Solution 500 milliGRAM(s) Oral two times a day  senna Syrup 17.6 milliLiter(s) Oral at bedtime  valproic  acid Syrup 750 milliGRAM(s) Oral three times a day  vitamin A &amp; D Ointment 1 Application(s) Topical daily    MEDICATIONS  (PRN):      CAPILLARY BLOOD GLUCOSE      POCT Blood Glucose.: 107 mg/dL (26 Jun 2022 05:37)  POCT Blood Glucose.: 128 mg/dL (25 Jun 2022 23:48)  POCT Blood Glucose.: 124 mg/dL (25 Jun 2022 17:57)  POCT Blood Glucose.: 79 mg/dL (25 Jun 2022 12:06)    I&O's Summary    25 Jun 2022 07:01  -  26 Jun 2022 07:00  --------------------------------------------------------  IN: 1460 mL / OUT: 550 mL / NET: 910 mL        Vital Signs Last 24 Hrs  T(C): 36.4 (26 Jun 2022 05:33), Max: 36.4 (26 Jun 2022 05:33)  T(F): 97.6 (26 Jun 2022 05:33), Max: 97.6 (26 Jun 2022 05:33)  HR: 76 (26 Jun 2022 05:33) (71 - 80)  BP: 111/67 (26 Jun 2022 05:33) (111/67 - 119/73)  BP(mean): --  RR: 19 (26 Jun 2022 05:33) (18 - 19)  SpO2: 100% (26 Jun 2022 05:33) (94% - 100%)    PHYSICAL EXAM:  GENERAL: NAD, well-developed, well-nourished  HEAD: Atraumatic, Normocephalic  EYES: EOMI, PERRLA, conjunctiva and sclera clear  NECK: Supple, No JVD  CHEST/LUNG: Clear to auscultation bilaterally; No wheezes or crackles  HEART: Normal S1/S2; Regular rate and rhythm; No murmurs, rubs, or gallops  ABDOMEN: Soft, Nontender, Nondistended; Bowel sounds present  EXTREMITIES: 2+ Peripheral Pulses; No clubbing, cyanosis, or edema  PSYCH: A&Ox3  NEUROLOGY: no focal neurologic deficit  SKIN: No rashes or lesions    LABS:                        12.0   6.01  )-----------( 221      ( 25 Jun 2022 07:37 )             37.2      06-25    138  |  98  |  18  ----------------------------<  137<H>  4.0   |  28  |  0.54    Ca    8.7      25 Jun 2022 07:37  Phos  5.2     06-25  Mg     1.80     06-25    TPro  6.9  /  Alb  2.8<L>  /  TBili  0.3  /  DBili  x   /  AST  32  /  ALT  22  /  AlkPhos  87  06-25    PT/INR - ( 24 Jun 2022 12:00 )   PT: 11.0 sec;   INR: 0.95 ratio         PTT - ( 24 Jun 2022 12:00 )  PTT:36.3 sec      Urinalysis Basic - ( 24 Jun 2022 12:30 )    Color: Yellow / Appearance: Slightly Turbid / SG: >1.040 / pH: x  Gluc: x / Ketone: Small  / Bili: Negative / Urobili: <2 mg/dL   Blood: x / Protein: 100 mg/dL / Nitrite: Negative   Leuk Esterase: Large / RBC: x / WBC 5-10 /HPF   Sq Epi: x / Non Sq Epi: Moderate / Bacteria: x        RADIOLOGY & ADDITIONAL TESTS:    Imaging Personally Reviewed:    Consultant(s) Notes Reviewed:      Care Discussed with Consultants/Other Providers:   Patient is a 57y old  Female who presents with a chief complaint of Bacteremia     (25 Jun 2022 11:54)      SUBJECTIVE / OVERNIGHT EVENTS: no acute events overnight. This morning, not arousable, withdraws to pain. Unable to participate in questions or exam so cannot obtain 12 point ROS.     MEDICATIONS  (STANDING):  albuterol/ipratropium for Nebulization 3 milliLiter(s) Nebulizer every 6 hours  calcium carbonate   Suspension 1250 milliGRAM(s) Oral daily  enoxaparin Injectable 40 milliGRAM(s) SubCutaneous every 24 hours  ferrous    sulfate Liquid 300 milliGRAM(s) Oral daily  lacosamide Solution 200 milliGRAM(s) Oral <User Schedule>  lactulose Syrup 15 Gram(s) Oral daily  levETIRAcetam  Solution 500 milliGRAM(s) Oral two times a day  senna Syrup 17.6 milliLiter(s) Oral at bedtime  valproic  acid Syrup 750 milliGRAM(s) Oral three times a day  vitamin A &amp; D Ointment 1 Application(s) Topical daily    MEDICATIONS  (PRN):      CAPILLARY BLOOD GLUCOSE      POCT Blood Glucose.: 107 mg/dL (26 Jun 2022 05:37)  POCT Blood Glucose.: 128 mg/dL (25 Jun 2022 23:48)  POCT Blood Glucose.: 124 mg/dL (25 Jun 2022 17:57)  POCT Blood Glucose.: 79 mg/dL (25 Jun 2022 12:06)    I&O's Summary    25 Jun 2022 07:01  -  26 Jun 2022 07:00  --------------------------------------------------------  IN: 1460 mL / OUT: 550 mL / NET: 910 mL        Vital Signs Last 24 Hrs  T(C): 36.4 (26 Jun 2022 05:33), Max: 36.4 (26 Jun 2022 05:33)  T(F): 97.6 (26 Jun 2022 05:33), Max: 97.6 (26 Jun 2022 05:33)  HR: 76 (26 Jun 2022 05:33) (71 - 80)  BP: 111/67 (26 Jun 2022 05:33) (111/67 - 119/73)  BP(mean): --  RR: 19 (26 Jun 2022 05:33) (18 - 19)  SpO2: 100% (26 Jun 2022 05:33) (94% - 100%)    PHYSICAL EXAM:  GENERAL: NAD, well-developed, well-nourished  HEAD: Atraumatic, Normocephalic  EYES: EOMI, PERRLA, conjunctiva and sclera clear  NECK: Supple, No JVD  CHEST/LUNG: Clear to auscultation bilaterally; No wheezes or crackles  HEART: Normal S1/S2; Regular rate and rhythm; No murmurs, rubs, or gallops  ABDOMEN: Soft, Nontender, Nondistended; Bowel sounds present. PEG site clean and dry   EXTREMITIES: 2+ Peripheral Pulses; No clubbing, cyanosis, or edema  PSYCH: A&Ox0, withdraws to sternal rub   NEUROLOGY: did not assess   SKIN: No rashes or lesions    LABS:                        12.0   6.01  )-----------( 221      ( 25 Jun 2022 07:37 )             37.2      06-25    138  |  98  |  18  ----------------------------<  137<H>  4.0   |  28  |  0.54    Ca    8.7      25 Jun 2022 07:37  Phos  5.2     06-25  Mg     1.80     06-25    TPro  6.9  /  Alb  2.8<L>  /  TBili  0.3  /  DBili  x   /  AST  32  /  ALT  22  /  AlkPhos  87  06-25    PT/INR - ( 24 Jun 2022 12:00 )   PT: 11.0 sec;   INR: 0.95 ratio         PTT - ( 24 Jun 2022 12:00 )  PTT:36.3 sec      Urinalysis Basic - ( 24 Jun 2022 12:30 )    Color: Yellow / Appearance: Slightly Turbid / SG: >1.040 / pH: x  Gluc: x / Ketone: Small  / Bili: Negative / Urobili: <2 mg/dL   Blood: x / Protein: 100 mg/dL / Nitrite: Negative   Leuk Esterase: Large / RBC: x / WBC 5-10 /HPF   Sq Epi: x / Non Sq Epi: Moderate / Bacteria: x        RADIOLOGY & ADDITIONAL TESTS:    Imaging Personally Reviewed:    Consultant(s) Notes Reviewed:      Care Discussed with Consultants/Other Providers:

## 2022-06-26 NOTE — PROGRESS NOTE ADULT - SUBJECTIVE AND OBJECTIVE BOX
Follow Up:      Inverval History/ROS:Patient is a 57y old  Female who presents with a chief complaint of BACTEREMIA (26 Jun 2022 07:27)    No fever   No events      Allergies    Topamax (Unknown)    Intolerances        ANTIMICROBIALS:      OTHER MEDS:  albuterol/ipratropium for Nebulization 3 milliLiter(s) Nebulizer every 6 hours  calcium carbonate   Suspension 1250 milliGRAM(s) Oral daily  enoxaparin Injectable 40 milliGRAM(s) SubCutaneous every 24 hours  ferrous    sulfate Liquid 300 milliGRAM(s) Oral daily  lacosamide Solution 200 milliGRAM(s) Oral <User Schedule>  lactulose Syrup 15 Gram(s) Oral daily  levETIRAcetam  Solution 500 milliGRAM(s) Oral two times a day  senna Syrup 17.6 milliLiter(s) Oral at bedtime  valproic  acid Syrup 750 milliGRAM(s) Oral three times a day  vitamin A &amp; D Ointment 1 Application(s) Topical daily      Vital Signs Last 24 Hrs  T(C): 36.4 (26 Jun 2022 05:33), Max: 36.4 (26 Jun 2022 05:33)  T(F): 97.6 (26 Jun 2022 05:33), Max: 97.6 (26 Jun 2022 05:33)  HR: 74 (26 Jun 2022 10:22) (71 - 80)  BP: 111/67 (26 Jun 2022 05:33) (111/67 - 119/73)  BP(mean): --  RR: 19 (26 Jun 2022 05:33) (18 - 19)  SpO2: 96% (26 Jun 2022 10:22) (94% - 100%)    PHYSICAL EXAM:  General: [x ] non-toxic  HEAD/EYES: [ ] PERRL x[ ] white sclera [ ] icterus  ENT:  [ ] normal [x ] supple [ ] thrush [ ] pharyngeal exudate  Cardiovascular:   [ ] murmur x[ ] normal [ ] PPM/AICD  Respiratory:  [x ] clear to ausculation bilaterally  GI:  x[ ] soft, non-tender, normal bowel sounds  :  [ ] mancilla [ ] no CVA tenderness   Musculoskeletal:  [x ] no synovitis  Neurologic:  [ ] non-focal exam   Skin:  [ ] no rash  Lymph: [x ] no lymphadenopathy  Psychiatric:  [ ] appropriate affect [ ] alert & oriented  Lines:  [x ] no phlebitis [ ] central line                                11.2   3.88  )-----------( 257      ( 26 Jun 2022 06:55 )             34.3       06-26    138  |  97<L>  |  13  ----------------------------<  135<H>  4.2   |  30  |  0.50    Ca    8.6      26 Jun 2022 06:55  Phos  4.8     06-26  Mg     1.80     06-26    TPro  6.9  /  Alb  2.8<L>  /  TBili  0.3  /  DBili  x   /  AST  32  /  ALT  22  /  AlkPhos  87  06-25      Urinalysis Basic - ( 24 Jun 2022 12:30 )    Color: Yellow / Appearance: Slightly Turbid / SG: >1.040 / pH: x  Gluc: x / Ketone: Small  / Bili: Negative / Urobili: <2 mg/dL   Blood: x / Protein: 100 mg/dL / Nitrite: Negative   Leuk Esterase: Large / RBC: x / WBC 5-10 /HPF   Sq Epi: x / Non Sq Epi: Moderate / Bacteria: x        MICROBIOLOGY:Culture Results:   No growth to date. (06-25-22 @ 07:38)  Culture Results:   <10,000 CFU/mL Normal Urogenital Charlette (06-24-22 @ 16:40)  Culture Results:   No growth to date. (06-24-22 @ 12:00)  Culture Results:   No growth to date. (06-24-22 @ 12:00)  Culture Results:   No growth (06-23-22 @ 04:20)  Culture Results:   Growth in anaerobic bottle: Staphylococcus epidermidis (06-23-22 @ 03:40)  Culture Results:   Growth in anaerobic bottle: Staphylococcus epidermidis Coag Negative  Staphylococcus  Single set isolate, possible contaminant. Contact  Microbiology if susceptibility testing clinically  indicated.  ***Blood Panel PCR results on this specimen are available  approximately 3 hours after the Gram stain result.***  Gram stain, PCR, and/or culture results may not always  correspond due to difference in methodologies.  ************************************************************  This PCR assay was performed by multiplex PCR. This  Assay tests for 66 bacterial and resistance gene targets.  Please refer to the [facility] test directory  at [email or web address] for details. (06-23-22 @ 03:40)      RADIOLOGY:

## 2022-06-26 NOTE — PROGRESS NOTE ADULT - PROBLEM SELECTOR PLAN 3
- c/w albuterol and budesonide  - chronic upper airway wheezes  -nurse noted wheezing today and paged respiratory for duoneb therapy

## 2022-06-26 NOTE — PROGRESS NOTE ADULT - PROBLEM SELECTOR PLAN 1
- pt did not meet SIRS, but was hypothermic and had + blood clx growing gram + cocci  - Source likely sacral decubitus ulcer, per ID. Afebrile with no leukocytosis. Imaging negative and UA negative for source of infection  - c/w vanc considering hx of MRSA, blood clx grows MRSE  - c/w zosyn considering hx of asp PNA growing pseudomonas and a weakly + UA  - f/u blood and urine clx, f/u sensitivity for downgrade  - ECHO to r/o valvular conditions causing bacteremia  - appreciate ID recs

## 2022-06-27 ENCOUNTER — TRANSCRIPTION ENCOUNTER (OUTPATIENT)
Age: 57
End: 2022-06-27

## 2022-06-27 VITALS
OXYGEN SATURATION: 95 % | DIASTOLIC BLOOD PRESSURE: 56 MMHG | SYSTOLIC BLOOD PRESSURE: 96 MMHG | TEMPERATURE: 98 F | RESPIRATION RATE: 19 BRPM | HEART RATE: 81 BPM

## 2022-06-27 LAB — GLUCOSE BLDC GLUCOMTR-MCNC: 95 MG/DL — SIGNIFICANT CHANGE UP (ref 70–99)

## 2022-06-27 PROCEDURE — 99221 1ST HOSP IP/OBS SF/LOW 40: CPT | Mod: FS

## 2022-06-27 PROCEDURE — 99239 HOSP IP/OBS DSCHRG MGMT >30: CPT

## 2022-06-27 RX ADMIN — Medication 3 MILLILITER(S): at 12:05

## 2022-06-27 RX ADMIN — LACOSAMIDE 200 MILLIGRAM(S): 50 TABLET ORAL at 13:29

## 2022-06-27 RX ADMIN — LACOSAMIDE 200 MILLIGRAM(S): 50 TABLET ORAL at 05:56

## 2022-06-27 RX ADMIN — LACTULOSE 15 GRAM(S): 10 SOLUTION ORAL at 13:28

## 2022-06-27 RX ADMIN — Medication 1250 MILLIGRAM(S): at 13:29

## 2022-06-27 RX ADMIN — LEVETIRACETAM 500 MILLIGRAM(S): 250 TABLET, FILM COATED ORAL at 05:56

## 2022-06-27 RX ADMIN — Medication 750 MILLIGRAM(S): at 13:29

## 2022-06-27 RX ADMIN — Medication 300 MILLIGRAM(S): at 13:29

## 2022-06-27 RX ADMIN — Medication 3 MILLILITER(S): at 03:18

## 2022-06-27 RX ADMIN — Medication 750 MILLIGRAM(S): at 05:56

## 2022-06-27 NOTE — PROGRESS NOTE ADULT - PROBLEM SELECTOR PLAN 4
- c/w keppra, depakote, and vimpax

## 2022-06-27 NOTE — PROGRESS NOTE ADULT - ATTENDING COMMENTS
Patient seen and examined at bedside. In brief, 57F with hx of cerebral palsy (bedbound at baseline, from group home), seizure disorder, dysphagia s/p PEG, frequent aspiration PNA/UTI, called back for abnormal lab result (gram positive cocci in blood cultures) from prior ED visit. VS notable for hypothermia, labs reviewed 6/23 BCx with GPC in clusters - methicillin resistant staph epi 4/4 bottles. CT C/A/P negative.     -  TTE un-interpretable   -  ID consult appreciate recs - concern for contaminant . monitoring off abx. f/u admission blood culture - prelim no growth to date    Rest of care as above.
Pt clinically stable off abx and no growth on repeat BCx. Appreciate ID input. Plan d/c to group home this afternoon. Time planning discharge 35 minutes.
57F with hx of cerebral palsy (bedbound at baseline, from group home), seizure disorder, dysphagia s/p PEG, frequent aspiration PNA/UTI, called back for abnormal lab result (gram positive cocci in blood cultures) from prior ED visit. VS notable for hypothermia, labs reviewed 6/23 BCx with GPC in clusters - methicillin resistant staph epi 4/4 bottles. CT C/A/P negative.     -  TTE un-interpretable   -  ID consult appreciate recs - concern for contaminan . monitoring off abx. f/u admission blood culture    Rest of care as above.

## 2022-06-27 NOTE — CONSULT NOTE ADULT - SUBJECTIVE AND OBJECTIVE BOX
Wound SURGERY CONSULT NOTE    FROM:   FOR:   Reason for Consult:    HPI:  57F PMH cerebral palsy, seizure d/o, bedbound at baseline, frequent aspiration PNA and UTI, presented to the ED today due to bacteremia growing gram + cocci. Pt initially presented to the ED due to hemoptysis w/ streaks of blood desatting to 87% yesterday. She was later dc'd yesterday. However her long term facility ([facility]) sent her back because her blood clx grew gram + cocci.    Spoke w/ facility RN. Reported that pt has always been wheezing and has been receiving albuterol and budesonide via mask. She has been taking bactrim due to frequent UTI. Regarding her frequent aspiration PNA they cannot do suction PRN at the facility and therefore had to keep her here for a month at her last visit. She has a distended belly at her baseline and a stage 3 ulcer following by wound care. Her PEG was dislodged, changed a few months ago, and was clogged once during her previous admission.    In the ED, pt was hypothermic, received vanc and zosyn, pending repeat blood clx. CT chest showed opacifications, no PE; CT abdomen and pelvis showed no significant pathology. UA weakly +ve. (24 Jun 2022 16:29)    requested to evaluate right heel decubitus      PAST MEDICAL & SURGICAL HISTORY:  Cerebral palsy      Seizure disorder      Constipation      Intellectual disability      Asthma      Gastrostomy tube dependent          REVIEW OF SYSTEMS      General: at bedrest, awake, but not responsive	    MEDICATIONS  (STANDING):  albuterol/ipratropium for Nebulization 3 milliLiter(s) Nebulizer every 6 hours  calcium carbonate   Suspension 1250 milliGRAM(s) Oral daily  enoxaparin Injectable 40 milliGRAM(s) SubCutaneous every 24 hours  ferrous    sulfate Liquid 300 milliGRAM(s) Oral daily  lacosamide Solution 200 milliGRAM(s) Oral <User Schedule>  lactulose Syrup 15 Gram(s) Oral daily  levETIRAcetam  Solution 500 milliGRAM(s) Oral two times a day  senna Syrup 17.6 milliLiter(s) Oral at bedtime  valproic  acid Syrup 750 milliGRAM(s) Oral three times a day  vitamin A &amp; D Ointment 1 Application(s) Topical daily    MEDICATIONS  (PRN):      Allergies    Topamax (Unknown)    Intolerances        SOCIAL HISTORY:    FAMILY HISTORY:      Vital Signs Last 24 Hrs  T(C): 36.4 (27 Jun 2022 14:00), Max: 36.4 (26 Jun 2022 21:06)  T(F): 97.6 (27 Jun 2022 14:00), Max: 97.6 (27 Jun 2022 14:00)  HR: 81 (27 Jun 2022 14:00) (78 - 90)  BP: 96/56 (27 Jun 2022 14:00) (96/56 - 102/78)  BP(mean): --  RR: 19 (27 Jun 2022 14:00) (18 - 19)  SpO2: 95% (27 Jun 2022 14:00) (94% - 100%)    PHYSICAL EXAM:      Constitutional: overweight  Liquid stool with contamination of urinary device- D/Skyler  US decubitus of right heel , no cellulitis, no discharge, wound is dry, 2.5 X 4  No overt ischemia of right foot, Contractures of bilateral lower legs    No leg edema  Incontinent of liquid stool, non melanotc  No sacral decubitus nioted    advise : wash wound daily with soap and water, paint decubitus with Betadine daily, complete cair boots  op f/u at [facility] wound care clinic            LABS:                        11.2   3.88  )-----------( 257      ( 26 Jun 2022 06:55 )             34.3     06-26    138  |  97<L>  |  13  ----------------------------<  135<H>  4.2   |  30  |  0.50    Ca    8.6      26 Jun 2022 06:55  Phos  4.8     06-26  Mg     1.80     06-26            Albumin, Serum: 2.8 g/dL (06-25 @ 07:37)  Albumin, Serum: 3.3 g/dL (06-24 @ 12:00)  Albumin, Serum: 3.0 g/dL (06-23 @ 03:48)             Wound SURGERY CONSULT NOTE    FROM:   FOR:   Reason for Consult:    HPI:  57F PMH cerebral palsy, seizure d/o, bedbound at baseline, frequent aspiration PNA and UTI, presented to the ED today due to bacteremia growing gram + cocci. Pt initially presented to the ED due to hemoptysis w/ streaks of blood desatting to 87% yesterday. She was later dc'd yesterday. However her long term facility ([facility]) sent her back because her blood clx grew gram + cocci.    Spoke w/ facility RN. Reported that pt has always been wheezing and has been receiving albuterol and budesonide via mask. She has been taking bactrim due to frequent UTI. Regarding her frequent aspiration PNA they cannot do suction PRN at the facility and therefore had to keep her here for a month at her last visit. She has a distended belly at her baseline and a stage 3 ulcer following by wound care. Her PEG was dislodged, changed a few months ago, and was clogged once during her previous admission.    In the ED, pt was hypothermic, received vanc and zosyn, pending repeat blood clx. CT chest showed opacifications, no PE; CT abdomen and pelvis showed no significant pathology. UA weakly +ve. (24 Jun 2022 16:29)    requested to evaluate right heel decubitus      PAST MEDICAL & SURGICAL HISTORY:  Cerebral palsy      Seizure disorder      Constipation      Intellectual disability      Asthma      Gastrostomy tube dependent          REVIEW OF SYSTEMS      General: at bedrest, awake, but not responsive	    MEDICATIONS  (STANDING):  albuterol/ipratropium for Nebulization 3 milliLiter(s) Nebulizer every 6 hours  calcium carbonate   Suspension 1250 milliGRAM(s) Oral daily  enoxaparin Injectable 40 milliGRAM(s) SubCutaneous every 24 hours  ferrous    sulfate Liquid 300 milliGRAM(s) Oral daily  lacosamide Solution 200 milliGRAM(s) Oral <User Schedule>  lactulose Syrup 15 Gram(s) Oral daily  levETIRAcetam  Solution 500 milliGRAM(s) Oral two times a day  senna Syrup 17.6 milliLiter(s) Oral at bedtime  valproic  acid Syrup 750 milliGRAM(s) Oral three times a day  vitamin A &amp; D Ointment 1 Application(s) Topical daily    MEDICATIONS  (PRN):      Allergies    Topamax (Unknown)    Intolerances        SOCIAL HISTORY:    FAMILY HISTORY:      Vital Signs Last 24 Hrs  T(C): 36.4 (27 Jun 2022 14:00), Max: 36.4 (26 Jun 2022 21:06)  T(F): 97.6 (27 Jun 2022 14:00), Max: 97.6 (27 Jun 2022 14:00)  HR: 81 (27 Jun 2022 14:00) (78 - 90)  BP: 96/56 (27 Jun 2022 14:00) (96/56 - 102/78)  BP(mean): --  RR: 19 (27 Jun 2022 14:00) (18 - 19)  SpO2: 95% (27 Jun 2022 14:00) (94% - 100%)    PHYSICAL EXAM:      Constitutional: overweight  Liquid stool with contamination of  Prima fit - D/Skyler  US decubitus of right heel , no cellulitis, no discharge, wound is dry, 2.5 X 4 cm  No overt ischemia of right foot, Contractures of bilateral lower legs    No leg edema  Incontinent of liquid stool, non melanotc  No sacral decubitus noted    PEG in place    advise : wash wound daily with soap and water, paint decubitus with Betadine daily, complete cair boots  Consider arterial doppler of legs  op f/u at [address] [place], wound care clinic            LABS:                        11.2   3.88  )-----------( 257      ( 26 Jun 2022 06:55 )             34.3     06-26    138  |  97<L>  |  13  ----------------------------<  135<H>  4.2   |  30  |  0.50    Ca    8.6      26 Jun 2022 06:55  Phos  4.8     06-26  Mg     1.80     06-26            Albumin, Serum: 2.8 g/dL (06-25 @ 07:37)  Albumin, Serum: 3.3 g/dL (06-24 @ 12:00)  Albumin, Serum: 3.0 g/dL (06-23 @ 03:48)

## 2022-06-27 NOTE — PROVIDER CONTACT NOTE (OTHER) - SITUATION
BP 96/56 hr 81
Unable to collect morning labs. Patient was stuck by night shift and 3 times during the day.

## 2022-06-27 NOTE — PROGRESS NOTE ADULT - PROBLEM SELECTOR PLAN 5
- PEG was functioning w/o leakage or signs of infxn  - c/w TF w/ flushes

## 2022-06-27 NOTE — PROGRESS NOTE ADULT - PROBLEM SELECTOR PLAN 6
- DVT: lovenox  - constipation: lactulose and Senna  - wound: wound care consult  - dispo: back to group home

## 2022-06-27 NOTE — DISCHARGE NOTE NURSING/CASE MANAGEMENT/SOCIAL WORK - NSDCPEFALRISK_GEN_ALL_CORE
For information on Fall & Injury Prevention, visit: [email or web address] OR  [email or web address] OR  https://www.cdc.gov/steadi/patient.html

## 2022-06-27 NOTE — PROVIDER CONTACT NOTE (OTHER) - BACKGROUND
58 y/o female admitted for bacteremia. pmh of seizure, asthma, cerebral palsy.
58 y/o female admitted for bacteremia. pmh of seizure, asthma, cerebral palsy.

## 2022-06-27 NOTE — PROGRESS NOTE ADULT - SUBJECTIVE AND OBJECTIVE BOX
Patient is a 57y old  Female who presents with a chief complaint of Bacteremia     (25 Jun 2022 11:54)      SUBJECTIVE / OVERNIGHT EVENTS: no acute events overnight.     MEDICATIONS  (STANDING):  albuterol/ipratropium for Nebulization 3 milliLiter(s) Nebulizer every 6 hours  calcium carbonate   Suspension 1250 milliGRAM(s) Oral daily  enoxaparin Injectable 40 milliGRAM(s) SubCutaneous every 24 hours  ferrous    sulfate Liquid 300 milliGRAM(s) Oral daily  lacosamide Solution 200 milliGRAM(s) Oral <User Schedule>  lactulose Syrup 15 Gram(s) Oral daily  levETIRAcetam  Solution 500 milliGRAM(s) Oral two times a day  senna Syrup 17.6 milliLiter(s) Oral at bedtime  valproic  acid Syrup 750 milliGRAM(s) Oral three times a day  vitamin A &amp; D Ointment 1 Application(s) Topical daily    MEDICATIONS  (PRN):      CAPILLARY BLOOD GLUCOSE    I&O's Summary      Vital Signs Last 24 Hrs        PHYSICAL EXAM:  GENERAL: NAD, well-developed, well-nourished  HEAD: Atraumatic, Normocephalic  EYES: EOMI, PERRLA, conjunctiva and sclera clear  NECK: Supple, No JVD  CHEST/LUNG: Clear to auscultation bilaterally; No wheezes or crackles  HEART: Normal S1/S2; Regular rate and rhythm; No murmurs, rubs, or gallops  ABDOMEN: Soft, Nontender, Nondistended; Bowel sounds present. PEG site clean and dry   EXTREMITIES: 2+ Peripheral Pulses; No clubbing, cyanosis, or edema  PSYCH: A&Ox0, withdraws to sternal rub   NEUROLOGY: did not assess   SKIN: No rashes or lesions    LABS:                   RADIOLOGY & ADDITIONAL TESTS:    Imaging Personally Reviewed:    Consultant(s) Notes Reviewed:      Care Discussed with Consultants/Other Providers:

## 2022-06-27 NOTE — PROGRESS NOTE ADULT - ASSESSMENT
57 year old with CP/seizure disorder    Recently evaluated in ED for cough    1) Positive blood culture  Blood culture + in 2/4 bottles with Staph epi on 6/23  No indwelling lines or evidence of phlebitis    This may be a contaminant   Repeat cultures from 6/24 (sent before antibiotics)  Observe off antibiotics    2) Cough   Ct with chronic changes - unchanged  WBC is normal   No fever  stop zosyn  Follow serial imaging with pmd    3)Right heel ulcer  eschar in place  no cellulitis, not fluctuant  Wound care     Observe off antibiotics  Will sign off  Call ID service with additional questions  
57F PMH cerebral palsy, seizure d/o, bedbound at baseline, frequent aspiration PNA and UTI, presented to the ED due to bacteremia growing gram + cocci. Considering Hx of frequent asp PNA, UTI, will c/w IV abx. Blood clx grew MRSE, gram positive clusters. ID on board.

## 2022-06-30 LAB
CULTURE RESULTS: SIGNIFICANT CHANGE UP
SPECIMEN SOURCE: SIGNIFICANT CHANGE UP

## 2022-07-14 NOTE — ED PROVIDER NOTE - OBJECTIVE STATEMENT
Moderna dose 1, 2, and 3
52 yo woman w/ PMH of CP(non verbal, does not ambulate), intractable [facility] since [facility].  Per aid, patient started having 1 minute seizures starting last night about 5-6 minutes apart.  This morning, seizures became less spaced out occurring one minute apart.  Compliant with her medications.  seizures manifesting as extremity jerking with eye rolling.

## 2022-07-25 NOTE — CONSULT NOTE ADULT - PROBLEM/RECOMMENDATION-2
Alert. Difficult to orient but seems relatively oriented, is following commands to best of his ability. Pt is mute but will nod yes/no & make incomprehensible sounds. Notepad @ bedside but difficult to read.  VSS except latest /159, doc paged & PRN labetalol & hydralazine avail. Labetalol given @ 0630.  LS clear. Denies pain. Oral suction @ bedside for secretions, able to suction indp.   Neuro: Significant L side weakness, L side face droop.   Skin: L upper gum bleed & R armpit lesion, with dressing, CDI otherwise WNL.  Incontinent to b/b. (+) BM this AM, was formed/hard, pt had difficult time passing, urinating adequately w fair.   Diet NPO. PEG tube w cont. Tube feed, running @ goal rate of 60mL/hr w q4h 120mL flushes. Asp precautions, tolerating tube feed. Turn/repo q2h, lift.     Plan:       Overall Patient Progress: no change          
DISPLAY PLAN FREE TEXT
DISPLAY PLAN FREE TEXT

## 2022-08-17 NOTE — CONSULT NOTE ADULT - ATTENDING COMMENTS
Detail Level: Generalized Detail Level: Detailed 54 yo female with CP, intractable epilepsy p/w 4 seizures described as eye twitching and full body shaking, each episode lasted <1min. Spoke with outpt provider, and at baseline, pt seizes 1-2 times per month. Home AEDs: LEV 750mg BID, /400 (level 13.2). Pt is borderline hypothermic at 95.1F on admission. Had another witnessed sz in ER. Not alert enough for PO med. No obvious provoking factor. S/p LEV 2gm in ER and LRZ.    - replace CBZ PO with VPA IV: VPA 15mg/kg f/b 500mg BID  - increase LEV 750mg BID to 1gm BID  - sz precaution

## 2022-08-20 NOTE — DISCHARGE NOTE ADULT - VISION (WITH CORRECTIVE LENSES IF THE PATIENT USUALLY WEARS THEM):
EMERGENCY DEPARTMENT ENCOUNTER    Pt Name: Rebekah Ga  MRN: [id number]  Pt :   1972  Room Number:  FITZ/FITZ  Date of encounter:  2022  PCP: Provider, No Known  ED Provider: Claudio Magallon MD    Historian: Patient, EMS      HPI:  Chief Complaint: Chest pain, potential STEMI        Context: Rebekah Ga is a 50-year-old woman who presents as a possible STEMI alert for severe substernal chest pain rating to both arms and her jaw.  She says she has no cardiac history but does have history of high blood pressure and has not been taking her blood pressure medication for the last couple of days.  She also has history of pancreatitis and anaphylaxis to aspirin.  She was walking and developed severe substernal chest pain with radiation.  She tried an albuterol inhaler which made it worse.  EMS were contacted and she has received 4 doses of nitroglycerin with improvement in her pain but it always recurs.  She has also received 50 mcg of fentanyl.  EMS said 2 of the rhythm strip showed elevations in V3 and V4, and as such she was called as a possible STEMI alert and cath team have been notified.  Upon arrival here she continues to rate her pain as severe but it is somewhat improved from initial.  Denies recent fevers or illnesses.  Other than the nitroglycerin she knows of nothing that makes the pain better.  No other complaints at this time.     PAST MEDICAL HISTORY  Past Medical History:   Diagnosis Date   • ADHD    • Anxiety    • Bipolar affective (HCC)    • Depression    • Hypertension    • Migraine          PAST SURGICAL HISTORY  No past surgical history on file.      FAMILY HISTORY  Family History   Problem Relation Age of Onset   • Breast cancer Mother 76   • Ovarian cancer Neg Hx          SOCIAL HISTORY  Social History     Socioeconomic History   • Marital status:    Tobacco Use   • Smoking status: Never Smoker   • Smokeless tobacco: Never Used   Substance and Sexual Activity   • Alcohol  use: No   • Drug use: No   • Sexual activity: Defer         ALLERGIES  Aspirin and Penicillins        REVIEW OF SYSTEMS  Review of Systems       All systems reviewed and negative except for those discussed in HPI.       PHYSICAL EXAM    I have reviewed the triage vital signs and nursing notes.    ED Triage Vitals   Temp Heart Rate Resp BP SpO2   08/19/22 2125 08/19/22 2124 08/19/22 2124 08/19/22 2124 08/19/22 2124   98.5 °F (36.9 °C) 67 16 (!) 186/112 99 %      Temp src Heart Rate Source Patient Position BP Location FiO2 (%)   08/19/22 2125 08/19/22 2124 08/19/22 2124 08/19/22 2124 --   Oral Monitor Sitting Right arm        Physical Exam  GENERAL:   Appears in obvious pain and distress, diaphoretic, clutching her chest  HENT: Nares patent.  EYES: No scleral icterus.  Pupils equal and reactive  CV: Regular rhythm, regular rate.  No appreciated effusion, or regional wall motion abnormalities on point-of-care echo  RESPIRATORY: Normal effort.  No audible wheezes, rales or rhonchi.  ABDOMEN: Soft, nontender  MUSCULOSKELETAL: No deformities.   NEURO: Alert, moves all extremities, follows commands.  SKIN: Warm, dry, no rash visualized.        LAB RESULTS  Recent Results (from the past 24 hour(s))   ECG 12 Lead    Collection Time: 08/19/22  9:09 PM   Result Value Ref Range    QT Interval 386 ms    QTC Interval 434 ms   Troponin    Collection Time: 08/19/22  9:16 PM    Specimen: Blood   Result Value Ref Range    Troponin T <0.010 0.000 - 0.030 ng/mL   Comprehensive Metabolic Panel    Collection Time: 08/19/22  9:16 PM    Specimen: Blood   Result Value Ref Range    Glucose 163 (H) 65 - 99 mg/dL    BUN 16 6 - 20 mg/dL    Creatinine 0.94 0.57 - 1.00 mg/dL    Sodium 142 136 - 145 mmol/L    Potassium 3.9 3.5 - 5.2 mmol/L    Chloride 106 98 - 107 mmol/L    CO2 23.0 22.0 - 29.0 mmol/L    Calcium 9.5 8.6 - 10.5 mg/dL    Total Protein 7.3 6.0 - 8.5 g/dL    Albumin 4.10 3.50 - 5.20 g/dL    ALT (SGPT) 27 1 - 33 U/L    AST (SGOT) 21 1  - 32 U/L    Alkaline Phosphatase 103 39 - 117 U/L    Total Bilirubin 0.6 0.0 - 1.2 mg/dL    Globulin 3.2 gm/dL    A/G Ratio 1.3 g/dL    BUN/Creatinine Ratio 17.0 7.0 - 25.0    Anion Gap 13.0 5.0 - 15.0 mmol/L    eGFR 74.5 >60.0 mL/min/1.73   Lipase    Collection Time: 08/19/22  9:16 PM    Specimen: Blood   Result Value Ref Range    Lipase 40 13 - 60 U/L   BNP    Collection Time: 08/19/22  9:16 PM    Specimen: Blood   Result Value Ref Range    proBNP 141.7 0.0 - 450.0 pg/mL   CBC Auto Differential    Collection Time: 08/19/22  9:16 PM    Specimen: Blood   Result Value Ref Range    WBC 12.23 (H) 3.40 - 10.80 10*3/mm3    RBC 5.47 (H) 3.77 - 5.28 10*6/mm3    Hemoglobin 16.5 (H) 12.0 - 15.9 g/dL    Hematocrit 49.2 (H) 34.0 - 46.6 %    MCV 89.9 79.0 - 97.0 fL    MCH 30.2 26.6 - 33.0 pg    MCHC 33.5 31.5 - 35.7 g/dL    RDW 13.5 12.3 - 15.4 %    RDW-SD 44.5 37.0 - 54.0 fl    MPV 9.6 6.0 - 12.0 fL    Platelets 398 140 - 450 10*3/mm3    nRBC 0.0 0.0 - 0.2 /100 WBC   D-dimer, Quantitative    Collection Time: 08/19/22  9:16 PM    Specimen: Blood   Result Value Ref Range    D-Dimer, Quantitative 0.29 0.01 - 0.50 MCGFEU/mL   Heparin Anti-Xa    Collection Time: 08/19/22  9:16 PM    Specimen: Blood   Result Value Ref Range    Heparin Anti-Xa (UFH) 0.10 (L) 0.30 - 0.70 IU/ml   Protime-INR    Collection Time: 08/19/22  9:16 PM    Specimen: Blood   Result Value Ref Range    Protime 11.3 (L) 11.4 - 14.4 Seconds    INR 0.83 (L) 0.84 - 1.13   aPTT    Collection Time: 08/19/22  9:16 PM    Specimen: Blood   Result Value Ref Range    PTT 29.5 (L) 60.0 - 90.0 seconds   COVID-19, [facility] IN-HOUSE,NASAL Swab (NO TRANSPORT MEDIA) 2 HR TAT - Swab, Nasopharynx    Collection Time: 08/19/22  9:22 PM    Specimen: Nasopharynx; Swab   Result Value Ref Range    COVID19 Presumptive Negative Presumptive Negative - Ref. Range       If labs were ordered, I independently reviewed the results.        RADIOLOGY  No Radiology Exams Resulted Within Past 24  Hours    I ordered and reviewed the above noted radiographic studies.        See radiologist's dictation for official interpretation.        PROCEDURES    Procedures    ECG 12 Lead   Preliminary Result   Test Reason : chest pain   Blood Pressure :   */*   mmHG   Vent. Rate :  76 BPM     Atrial Rate :  76 BPM      P-R Int : 160 ms          QRS Dur :  86 ms       QT Int : 386 ms       P-R-T Axes :  45  33  48 degrees      QTc Int : 434 ms      Normal sinus rhythm   Cannot rule out Anterior infarct , age undetermined   Abnormal ECG   When compared with ECG of 05-JUN-2022 22:11,   Minimal criteria for Anterior infarct are now present   ST elevation now present in Anterior leads   T wave inversion no longer evident in Anterior leads      Referred By: EDMD           Confirmed By:       ECG 12 Lead    (Results Pending)       MEDICATIONS GIVEN IN ER    Medications   sodium chloride 0.9 % flush 10 mL (has no administration in time range)   heparin 24277 units/250 mL (100 units/mL) in 0.45 % NaCl infusion (has no administration in time range)   Pharmacy to Dose Heparin (has no administration in time range)   nitroglycerin (TRIDIL) 200 mcg/ml infusion (50 mcg/min Intravenous Rate/Dose Change 8/19/22 2153)   fentaNYL citrate (PF) (SUBLIMAZE) injection 25 mcg (has no administration in time range)   fentaNYL citrate (PF) (SUBLIMAZE) injection (25 mcg Intravenous Given 8/19/22 2149)   midazolam (VERSED) injection (1 mg Intravenous Given 8/19/22 2149)   lidocaine PF 1% (XYLOCAINE) injection (5 mL Infiltration Given 8/19/22 2145)   heparin (porcine) injection (8,000 Units Intravenous Given 8/19/22 2151)   heparin (porcine) injection 4,000 Units (4,000 Units Intravenous Given 8/19/22 2127)         PROGRESS, DATA ANALYSIS, CONSULTS, AND MEDICAL DECISION MAKING    All labs have been independently reviewed by me.  All radiology studies have been reviewed by me and the radiologist dictating the report.   EKG's have been independently  viewed and interpreted by me.            ED Course as of 08/19/22 2159   Fri Aug 19, 2022   2123 This is a 50-year-old woman who presents as a possible STEMI alert for severe substernal chest pain rating to both arms and her jaw.  She says she has no cardiac history but does have history of high blood pressure and has not been taking her blood pressure medication for the last couple of days.  She also has history of pancreatitis and anaphylaxis to aspirin.  She was walking and developed severe substernal chest pain with radiation.  She tried an albuterol inhaler which made it worse.  EMS were contacted and she has received 4 doses of nitroglycerin with improvement in her pain but it always recurs.  She has also received 50 mcg of fentanyl.  EMS said 2 of the rhythm strip showed elevations in V3 and V4, and as such she was called as a possible STEMI alert and cath team have been notified.  Upon arrival here she continues to rate her pain as severe but it is somewhat improved from initial.  Denies recent fevers or illnesses.  Other than the nitroglycerin she knows of nothing that makes the pain better.  No other complaints at this time. [CC]   2124 She arrived awake and alert but in obvious pain.  Initial stat twelve-lead ECG reveals normal sinus rhythm but elevations most pronounced in V2 but also in V3 and V4.  Story is convincing for cardiac chest pain.  I have texted the twelve-lead and EMSs rhythm strip to Dr. Goncalves discussed the case who will plan to take her to the Cath Lab.  Holding aspirin because of anaphylaxis but starting on heparin and nitroglycerin.  Patient is agreeable to this plan.  She remained stable throughout her time in the emergency department was taken to the cardiac Cath Lab. [CC]   2155 Initial troponin is negative. [CC]      ED Course User Index  [CC] Claudio Magallon MD     35 minutes of critical care provided. This time excludes other billable procedures. Time does include  preparation of documents, medical consultations, review of old records, and direct bedside care. Patient is at high risk for life-threatening deterioration due to chest pain with ST elevations on ECG.         AS OF 21:59 EDT VITALS:    BP - (!) 170/114  HR - 76  TEMP - 98.5 °F (36.9 °C) (Oral)  O2 SATS - 96%                  DIAGNOSIS  Final diagnoses:   ST elevation myocardial infarction (STEMI), unspecified artery (HCC)   Chest pain due to myocardial ischemia, unspecified ischemic chest pain type   Hypertension, unspecified type         DISPOSITION  Cath Lab             Claudio Magallon MD  08/19/22 0218     Normal vision: sees adequately in most situations; can see medication labels, newsprint

## 2022-10-12 NOTE — ED ADULT TRIAGE NOTE - MODE OF ARRIVAL
HISTORY OF PRESENT ILLNESS:  This is a right-hand-dominant 70 year old woman coming in for follow-up visit after lumbar epidural steroid injection on September 22, 2022 which had targeted right L5-S1 and S1 levels under fluoroscopic guidance.    The pain after the procedure is mostly in the back.  She had both the back and the leg pain before the procedure.  The pain now goes across the whole back.  And it is constant.  She has achy pain with intermittent sharpness.  She feels some numbness without weakness.  Numbness is in the gluteal area.  Sometimes falling asleep is difficult but she can sleep.  She denies increased pain with Valsalva maneuver.  Walking and standing increases the pain.    She had trigger point injection the right given her on July 20, 2022. The patient with like to be considered for radiofrequency ablation again because she did so well in 2020 but that was before her L4-5 fusion in June 2021.    She was able to walk without a cane.  The pain radiates to the posterior aspect of her thigh on the right side to the behind the popliteal fossa region.  The back pain is worse than the leg pain.  Sitting and standing increases the pain.  She denies sleep disturbance.  She has not been having sleep disturbance since the bursa injection    Back pain is worse on the right side.  But it can switch to the other side.  It can radiate down to the posterior aspect of the thigh but not below that.  The patient is recipient of bilateral trochanteric bursa steroid injection June 29, 2022 with some relief of pain.     The pain can radiate down to the posterior aspect of thigh down to the knee but not below.  The pain is worse with Valsalva maneuver.  She denies numbness or tingling sensation into the toes.  She denies use of a cane to ambulate.  Any activity lasting longer than 15 minutes can elicit the pain.  Elevating the legs in a recliner helps with the pain.      Pain score today 6 out of 10.   Gabapentin and Advil also helps with the pain.      She is looking at traveling through [place] in 2 weeks.    The patient is a recipient of bilateral L5-S1 epidural steroid injection on July 8, 2021.  She also received a bilateral quadratus lumborum muscle trigger point injection on June 11, 2021.  Both injections helped her.    She has had previous cervical laminectomy and foraminotomy some 20 years ago.  Lumbar spine laminectomy and foraminotomy was also performed in 2017 when she failed to respond to an epidural steroid injection.    Physical Therapy history is pertinent with the fact that she had it after surgery in 2018.  In [place] she was doing water aerobics 3 times a week.  She goes to aqua therapy on a regular basis.      IMAGING PROCEDURE: Patient had a EMG with Dr. Hopper October 29, 2019 which showed sensory polyneuropathy condition in the feet.  There is moderate chronic S1 and S2 radiculopathy condition which is progressed on the right side but new on the left side.    MRI of the lumbar spine was performed at [facility] June 7, 2021.  At T12-L1 resolution of the disc protrusion which was present in the earlier study is seen.  Old compression deformity of T12 is also noted.  At L1-L2 stable mild central stenosis is noted.  At L3-4 mild central stenosis and facet arthropathy is noted.  At L4-L5 new postsurgical changes with posterior decompression and pedicle screws at L4 and L5 is noted.  At L5-S1 there is a diffuse disc bulge.      ALLERGIES:   Allergen Reactions   • Latex Other (See Comments)     Unknown   • No Name Available Other (See Comments)     Tegaderm Misc- Unknown   • Skin Adhesives RASH     Blistering rash   • Tegaderm Alginate Ag Other (See Comments)     Maddie         Current Outpatient Medications   Medication Sig Dispense Refill   • benzonatate (TESSALON PERLES) 100 MG capsule Take 1 capsule by mouth 3 times daily as needed for Cough. 30 capsule 0   • furosemide (LASIX) 20 MG  tablet TAKE 1 TABLET BY MOUTH EVERY DAY 90 tablet 1   • gabapentin (NEURONTIN) 300 MG capsule Take 300 mg by mouth as needed.     • gabapentin (NEURONTIN) 300 MG capsule Take 1 capsule by mouth in the morning and 1 capsule at noon and 1 capsule before bedtime. 90 capsule 2   • gabapentin (NEURONTIN) 100 MG capsule Take 1 capsule by mouth in the morning and 1 capsule at noon and 1 capsule before bedtime. 90 capsule 2   • gabapentin (NEURONTIN) 100 MG capsule Take 100 mg by mouth as needed.     • gabapentin (NEURONTIN) 600 MG tablet Take 600 mg by mouth in the morning and 600 mg at noon and 600 mg in the evening.     • metFORMIN (GLUCOPHAGE-XR) 500 MG 24 hr tablet Take 1,000 mg by mouth at bedtime.      • dicyclomine (BENTYL) 20 MG tablet Take 1 tablet by mouth 4 times daily as needed (IBS). 180 tablet 1   • DENOSumab (PROLIA) 60 MG/ML Solution Prefilled Syringe Inject 1 mL into the skin every 6 months. 1 each 1   • anastrozole (ARIMIDEX) 1 MG tablet Take 1 mg by mouth daily.      • levothyroxine (SYNTHROID, LEVOTHROID) 50 MCG tablet Take 50 mcg by mouth.        No current facility-administered medications for this encounter.     .  PAST MEDICAL HISTORY: Breast cancer, hypothyroidism, obesity, spinal arthropathy, history of laminectomy and foraminotomy from L3 down to S1, cervical discectomy and foraminotomy    PAST SURGICAL HISTORY: L4-5 fusion 2020 with Dr. Baker, laminectomy and foraminotomy L3-S1, cervical discectomy, cervical foraminotomy,  section x2, hysterectomy, bilateral oophorectomy, breast lumpectomy, bowel resection, cholecystectomy    FAMILY HISTORY: History of back problems with the father and the siblings.  Her father passed away at age 50 from congestive heart failure.  Mother had pancreatic cancer and passed away at the age of 72.  Sister has lupus and fibromyalgia.    REVIEW OF SYSTEMS:  The patient is positive forBreast cancer, hypothyroidism, obesity, spinal arthropathy, history  of laminectomy and foraminotomy from L3 down to S1, cervical discectomy and foraminotomy    She has recent weight gain of 20 pounds.  The patient denies symptoms pertaining to   HEENT,  cardiovascular, respiratory, gastrointestinal, Genitourinary, neurological, psychiatric,  hematologic, or immunological systems.    SOCIAL HISTORY:  The patient denies smoking and drinking.    The patient is .  Opioid risk tool score 0.    PHYSICAL EXAMINATION:    Visit Vitals  /87 (BP Location: RUE - Right upper extremity, Patient Position: Sitting)   Pulse 87   Temp 97.8 °F (36.6 °C) (Temporal)   Resp 16   Ht 5' 5\" (1.651 m)   Wt 108.9 kg (240 lb)   SpO2 95%   BMI 39.94 kg/m²       GENERAL:  This is a well-developed, well-nourished patient, who is not in acute distress.     LUNGS:  Clear.     HEART:  Sounds regular.     ABDOMEN:  Soft and nontender with normoactive bowel sounds.     NEUROLOGIC:  Cranial nerves 2 through 12 intact.  The patient is alert and oriented x3.     NECK:  Supple without any lymphadenopathy or thyromegaly.     MUSCULOSKELETAL:     She is able to ambulate without an assistive device.  She is able to both tiptoe and heel walk.  Tiptoe walk is worse than the heel walk.  She is able to squat.  Flexion maneuver increases the pain more than the extension maneuver.  Lateral bending and rotation maneuvers handled better.    She has significant paravertebral muscle tenderness which is present more prominent on the right side than the left.  She has bilateral quadratus lumborum muscle myofascial tenderness.  Sciatic notch tenderness is present on the right side only.  Trochanteric bursa tenderness is no longer present bilaterally.      Deep tendon reflexes are not obtainable at the knees or at the ankles.  The previous laminectomy site is approximately 20 cm long and multiple incisions have been made.  There is also to lateral incisions approximately 10 cm long on each side.  There is no evidence of any  EMS infection.    She has some lymphedema but without significant peripheral edema.  Patient has slightly diminished sensation in the bilateral feet but above the ankle to sensation sharp.  Straight leg raising is negative.  Facet loading tenderness present bilaterally.  There is worse to the right side.  Sensorimotor intact in the lower extremities.    IMPRESSION:  This is a patient with history of Breast cancer, hypothyroidism, obesity, spinal arthropathy, previous history of cervical and lumbar laminectomy and foraminotomy coming in with known history of compression fracture at T12 that included bilateral pedicle processes coming in with persistent pain in the bilateral lower back.  She is experiencing significant myofascial tenderness along with facet arthropathy condition.  She is doing better with a trochanteric bursitis condition and better with radiculopathy condition after the epidural steroid injection.    Now that radiculopathy is under control we can proceed with diagnostic facet work-up but were going to have to work around the fusion instrumentation.    She does not need a gabapentin refill at this time.    Further recommendations to follow pending her response to the diagnostic facet nerve blocks.  The target level will be bilateral L2-3, L3-4, L4-5 and S1 levels under fluoroscopic guidance.    Thank you very much Dr. Hanson..  I will keep you informed.      Arlene Vidal MD  10:51 AM  10/12/2022       Ambulance

## 2022-10-20 NOTE — PROGRESS NOTE ADULT - SUBJECTIVE AND OBJECTIVE BOX
Pt awake and alert upon assessment. On tele running NSR/ST. On RA. Constipation. Pt had BM, but still feels as though she has to go. Pt was unable to urinate. Straight cath with 950ml output. Redness/bruising noted to perineal area. PT/OT to evaluate. Orthostatics Qshift. Regular diet. Neurology Follow up note    Name  NAYANA JACQUES        Subjective: Saw and examined patient at bedside. Called back to room as patient was having another seizure. Resolved on its own. 6 seizures reported overnight.    MEDICATIONS  (STANDING):  ALBUTerol    90 MICROgram(s) HFA Inhaler 1 Puff(s) Inhalation every 4 hours  ALBUTerol/ipratropium for Nebulization 3 milliLiter(s) Nebulizer every 6 hours  AQUAPHOR (petrolatum Ointment) 1 Application(s) Topical two times a day  artificial  tears Solution 2 Drop(s) Both EYES three times a day  aspirin enteric coated 81 milliGRAM(s) Oral daily  benztropine 0.5 milliGRAM(s) Oral two times a day  buDESOnide   0.5 milliGRAM(s) Respule 0.5 milliGRAM(s) Inhalation daily  clotrimazole 1% Cream 1 Application(s) Topical two times a day  enoxaparin Injectable 40 milliGRAM(s) SubCutaneous daily  fluticasone propionate 50 MICROgram(s)/spray Nasal Spray 1 Spray(s) Both Nostrils two times a day  fosphenytoin IVPB 1600 milliGRAM(s) PE IV Intermittent once  fosphenytoin IVPB 300 milliGRAM(s) PE IV Intermittent <User Schedule>  lactulose Syrup 10 Gram(s) Oral two times a day  levETIRAcetam  IVPB 1000 milliGRAM(s) IV Intermittent every 12 hours  nystatin Powder 1 Application(s) Topical two times a day  OLANZapine 7.5 milliGRAM(s) Oral at bedtime  pantoprazole    Tablet 40 milliGRAM(s) Oral before breakfast  sodium chloride 0.45%. 1000 milliLiter(s) (75 mL/Hr) IV Continuous <Continuous>  tiotropium 18 MICROgram(s) Capsule 1 Capsule(s) Inhalation daily    MEDICATIONS  (PRN):  LORazepam   Injectable 0.5 milliGRAM(s) IV Push every 6 hours PRN seizure activity < 30seconds  LORazepam   Injectable 1 milliGRAM(s) IV Push every 6 hours PRN seizure activity > 30 seconds      Allergies    Topamax (Unknown)    Intolerances        Objective:   Vital Signs Last 24 Hrs  T(C): 36.4 (18 Jun 2018 05:59), Max: 36.4 (17 Jun 2018 21:29)  T(F): 97.6 (18 Jun 2018 05:59), Max: 97.6 (18 Jun 2018 05:59)  HR: 80 (18 Jun 2018 09:40) (65 - 89)  BP: 121/63 (18 Jun 2018 05:59) (121/63 - 153/86)  BP(mean): --  RR: 19 (18 Jun 2018 05:59) (16 - 19)  SpO2: 97% (18 Jun 2018 09:40) (92% - 97%)    General Exam:   General appearance: No acute distress                   Neurological Exam:  Mental Status: alert and awake, non verbal, does not follow commands    Cranial Nerves: EOMI, V1-V3 intact, facial symmetry intact    Motor: moves extremities spontaneously    Sensation: Intact to LT throughout    Coordination: deferred      Other:    06-18    146<H>  |  104  |  5<L>  ----------------------------<  76  3.8   |  32<H>  |  0.51    Ca    8.5      18 Jun 2018 07:00  Phos  3.5     06-18  Mg     1.8     06-18    TPro  5.8<L>  /  Alb  2.3<L>  /  TBili  < 0.2<L>  /  DBili  x   /  AST  19  /  ALT  14  /  AlkPhos  69  06-16    06-18    146<H>  |  104  |  5<L>  ----------------------------<  76  3.8   |  32<H>  |  0.51    Ca    8.5      18 Jun 2018 07:00  Phos  3.5     06-18  Mg     1.8     06-18    TPro  5.8<L>  /  Alb  2.3<L>  /  TBili  < 0.2<L>  /  DBili  x   /  AST  19  /  ALT  14  /  AlkPhos  69  06-16    LIVER FUNCTIONS - ( 16 Jun 2018 12:20 )  Alb: 2.3 g/dL / Pro: 5.8 g/dL / ALK PHOS: 69 u/L / ALT: 14 u/L / AST: 19 u/L / GGT: x             Radiology    EKG:  tele:  TTE:  EEG:

## 2022-10-25 NOTE — CONSULT NOTE ADULT - PROVIDER SPECIALTY LIST ADULT
No protocols available.  
Prescription sent to pharmacy.  
Neurology
Pulmonology
GYN
Infectious Disease

## 2022-10-28 NOTE — H&P ADULT - PROBLEM/PLAN-8
Next appt 12/6/2022   Last appt 8-10-22    Refill Requested / Last Refill Info:  predniSONE (DELTASONE) 5 MG tablet 90 tablet 0 8/30/2022     Sig - Route: Take 1 tablet by mouth daily. - Oral    Sent to pharmacy as: predniSONE 5 MG Oral Tablet (DELTASONE)    Class: Eprescribe    Notes to Pharmacy: Dose adjustment. Currently on 10 mg alternating with 5.  Lets try to go to 5 mg daily      Refill unable to be completed per standing System's protocol due to: Non-Protocol Medication and Please confirm is still using 10 mg dosage    Orders pended, and routed to provider for approval.   Please route any notes back to your nursing pool via patient call NOT Rx Auth.  Thank you, Refill Center Staff     DISPLAY PLAN FREE TEXT

## 2022-10-30 NOTE — ED ADULT NURSE NOTE - NS ED NOTE ABUSE RESPONSE YN
Patient in the room with mother , sister, and grandmother. Patient reports that she feels unsafe with her father. Reports that father raises his voice and his hand, denies being physical with her. Patient is smiling , cooperative, follows commands, reports no pain, no distress in mother's presents. Unable to assess due to medical condition

## 2022-11-03 NOTE — PATIENT PROFILE ADULT - NSPROEDAABILITYLEARN_GEN_A_NUR
----- Message from Mckenzie Burr sent at 11/3/2022  9:35 AM CDT -----  Contact: MOM  [phone]  [email or web address]     Patient is calling for Medical Advice regarding:    How long has patient had these symptoms:    Pharmacy name and phone#:    Would like response via [employer]:        Comments: MOM would like to know since the was seen and his weight was taken Mom would like to know how much tylenol to give the pt         cognitive limitations

## 2022-11-10 NOTE — ED PROVIDER NOTE - WR ORDER ID 1
Nov10 positive RSV spoke with father child doing well instructed to return to er if symptoms worsen [id number]

## 2022-11-29 NOTE — ED ADULT NURSE NOTE - CHIEF COMPLAINT QUOTE
Medication: predniSONE (DELTASONE) 5 MG tablet    Last Written Prescription Date:  9/21/2022  Last Fill Quantity: 120,   # refills: 0  Last Office Visit: 9/21/2022  Future Office visit:  3/28/2023    Routing refill request to RN for review.     Aissatou Clark [facility]   11/29/2022 9:44 AM       Pt BIBEMS from [facility] for G tube problem. Per EMS, facility went to give morning medications and found her G-tube as clogged. Arrives with audible wheezing noted, per EMS facility states this is her baseline. Pt non-verbal at baseline. Refusing oral temp in triage. Pmhx: Cerebral Palsy, asthma,

## 2022-12-21 NOTE — PROVIDER CONTACT NOTE (MEDICATION) - DATE AND TIME:
06-Sep-2019 01:32 Spoke to  regarding extending day for pt, states pt can stay in same room. Extended reservation request emailed to Addison Emmanuel.

## 2023-01-17 NOTE — SWALLOW BEDSIDE ASSESSMENT ADULT - SWALLOW EVAL: RECOMMENDED DIET
PAST SURGICAL HISTORY:  History of laryngoscopy       
continue with NGT, recommend GOC discussion
1. Oral means of Nutrition/ Hydration/ Medication contraindicated 2. Consider artificial non-oral means of nutrition/ hydration/ medication 3. Defer to MD for Nutritional Plan of Care/ Goals of Care discussion with family/caregivers
oral nutrition/hydration/medication is contraindicated, continue with alternate means and consider GOC discussion

## 2023-03-06 NOTE — DIETITIAN INITIAL EVALUATION ADULT. - OTHER INFO
Nutrition assessment initiated for critical care LOS. Pt. alert, non verbal, met w/ nursing who reports tolerating PO diet , taking PO diet, also met w/ group home aide, who states pt.'s PO intake  poor in hospital , only took few spoons of cereal and tea, then stopped . Per flow sheet  pt. took 955 of the melas on  6/13/18.  No recent wt. changes noted per aide and no difficulty chewing, swallowing , pt. feeds self w/ her special equipment , keeps her on low salt diet in the home ..
none

## 2023-03-08 NOTE — PATIENT PROFILE ADULT - NSPROPASSIVESMOKEEXPOSURE_GEN_A_NUR
[de-identified] : Ms. JEREMIAS HILARIO is a 34 year  year old woman. She presented to the office for the first time on 01/25/2023 , her primary is CHRIS RUELAS .\par \par She presented to the office with h/o epigastric pain and right upper quadrant pain after eating potato salad.\par Her us gallstones , cbd is normal .\par lfts are normal \par She has facgtor 5 leidean is 12 weeks post partum . When she was 10 week pregnant was started on lovenox and stopped it 1 month ago . \par \par spoke with vascular \par will see Odaimi next week. \par 3/85/23 : \par she is doing well\par she is on 14 days blood thiners Unknown

## 2023-03-18 NOTE — ED PROVIDER NOTE - ATTENDING CONTRIBUTION TO CARE
No new care gaps identified.  [facility] Embedded Care Gaps. Reference number: [id number]. 3/18/2023   9:50:22 AM CDT   I have seen and examined the patient on the patient´s visit date. I have reviewed the note written by Lobito Graham MD  on that visit day. I have supervised and participated as necessary in the performance of procedures indicated for patient management and was available at all phases of the patient´s visit when needed. We discussed the history, physical exam findings, management plan, and  medical decision making. I have made my additions, exceptions, and revisions within the chart and I agree with H and P as documented in its entirety. The data and my interpretation of any data collected from labs, interventions and imaging appear below as well as my independent medical decision making and considerations    The patient is a 55y Female who has a past medical and surgery history of Intellectual disability Constipation Seizure disorder Cerebral palsy and Gastrostomy tube dependent PTED with seizures in setting of low valproate level; Keppra level pending   Vital Signs Last 24 Hrs  T(F): 97.1 HR: 81 BP: 133/87 RR: 16 SpO2: 96% (28 Aug 2020 19:41) (96% - 96%)  PE: as described; my additions and exceptions are noted in the chart  DATA:   Lab Results:                        13.8   8.53  )-----------( 282      ( 28 Aug 2020 21:15 )             41.4   Mean Cell Volume: 97.4 fL (08-28-20 @ 21:15) Auto Neutrophil %: 56.6 % (08-28-20 @ 21:15) Auto Eosinophil %: 3.8 % (08-28-20 @ 21:15)  08-28    132<L>  |  92<L>  |  13  ----------------------------<  95  5.2   |  28  |  0.49<L>    Ca    9.5      28 Aug 2020 21:15 Phos  3.8     08-28 Mg     2.0     08-28    TPro  8.1  /  Alb  4.3  /  TBili  0.3  /  DBili  x   /  AST  17  /  ALT  16  /  AlkPhos  88  08-28      IMAGING:  MDM: seizure in setting of subtx valproate level and borderline hyponatremia  IMP (IRISK):  Management (Plan):  IVFs  valproate supplementation   Follow Keppra level outpt  RTED PRN

## 2023-06-06 NOTE — PROVIDER CONTACT NOTE (MEDICATION) - DATE AND TIME:
09-Mar-2019 13:26
pt presents to ED from OBGYN for pelvic pain, back pain. states - urine test at MD office. sent in for further evaluation, CT scan. denies vaginal bleeding.

## 2023-06-23 NOTE — PATIENT PROFILE ADULT. - HOW PATIENT ADDRESSED, PROFILE
[FreeTextEntry1] : The colonoscopy to the terminal ileum performed at the [facility] suite on May 30, 2023revealed mild left-sided diverticulosis, erythema, friability and hard consistency in the sigmoid colon secondary to sigmoid colon cancer extending from 12 cm to 20 cm from the anal verge with a colonic stent opening the prior obstructing colonic lesion. There were no other masses, masses, AVMs or colitis noted.\par \par The attempted colonoscopy to the recto-sigmoid colon performed at the [facility] end[facility] suite on December 8, 2022 revealed a large, circumferential, nearly obstructing and friable rectal lesion at 15 cm from the anal verge. Unable to advance the colonoscope further secondary to the nearly obstructing rectal lesion. Multiple biopsies were taken of the rectal lesion.  There were no other masses. polyps, diverticulosis, AVMs or colitis noted.  The colonic pathology performed on December 8, 2022 revealed a moderately differentiated invasive adenocarcinoma (rectal mass at 15 cm). The section reveals moderately differentiated adenocarcinoma infiltrating the desmoplastic stroma in a background of adenomatous change. [de-identified] : The CT angio for pulmonary embolism performed on March 2, 2023 revealed negative for pulmonary embolism with no alternate acute cardiopulmonary findings and presumed liver metastasis.  \par \par The CAT scan of the chest, abdomen and pelvis with IV contrast performed on December 21, 2022 revealed sigmoid colon cancer with lymphadenopathy and liver metastasis. There is no suspicious lung nodules. There is calcified granulomas in the left lung. Also noted was status post fixation of the left posterior acetabular fracture and tiny right renal stone. \par \par The CT PET scan performed on January 4, 2023 revealed sigmoid colon malignancy with malignant lymphadenopathy below the diaphragm, peritoneal implants and hepatic metastasis. Mariah

## 2023-08-23 NOTE — ED PROVIDER NOTE - TOBACCO USE
In responding to this request, please exercise your independent professional judgment.  The fact that a question is asked does not imply that any particular answer is desired or expected. Never smoker

## 2023-08-26 NOTE — ED ADULT NURSE NOTE - CHIEF COMPLAINT QUOTE
normal... pt BIBA from group home, pt accompanied by aide, as per EMS, pt had 5 seizures today with vomiting.  unable to obtain temp in triage.  pt non-verbal at baseline

## 2023-09-05 NOTE — EEG REPORT - NS EEG TEXT BOX
Study Date: 3/22-3/23/2019    History:  CONCERN FOR SEIZURE    Study Interpretation:    FINDINGS:  The background was continuous, spontaneously variable and reactive. There was no PDR. The background consisted of continuous diffuse polymorphic and rhythmic delta and theta frequencies.     Sleep Background:  Drowsiness and Stage II sleep transients were not recorded.    Other Non-Epileptiform Findings:  None were present.    Interictal Epileptiform Activity:   -Sharp Waves, Generalized, Maximum Bi-Frontal sometimes in short runs up to 8 seconds with a frequency of 0.5-1Hz  -Sharp Waves, Regional, bilateral independent Frontotemporal, Maximum F7/T7 and F8/T8    Events:     3 electrographic seizures captured with non-localizable, diffuse onset, possibly R temporal .  Three of these seizures with possible rhythmic theta activity over the right anterior/right tempoal region prior to diffuse spread.    EEG Description: Burst of alpha/beta activity over the both hemispheres with diffuse attenuation of the background, progressing to admixture of semirhythmic alpha/theta activity, Duration ~15-40 sec.  Three of these seizures were proceeded by right anterior rhythmic theta/delta activity.    Clinical Description: Minimal clinical component, With facial grimace, grunting sound with slight head turn to the left and possibly head jerk.    times of the seizures bellow:    bernadette	Duration	Title    d1 15:56:07		Seizure onset  d1 15:56:56		Patient Event  d1 15:57:00		offset (electrographic)  d1 17:13:12		Seizure onset  d1 17:13:42		offset (electrographic)  d1 18:52:56		Seizure onset  d1 18:53:38		offset (electrographic)  d2 01:06:05		Seizure onset  d2 01:06:38		offset (electrographic)  d2 extensive artefact      Activation Procedures:   Hyperventilation was not performed.    Photic stimulation was not performed.    Artifacts:  Intermittent myogenic and movement artifacts were noted.    ECG:  The heart rate on single channel ECG was predominantly between 60-70 BPM.    EEG Summary/Classification:  Abnormal EEG in awake state  -Sharp Waves, Generalized, Maximum Bi-Frontal, sometimes with frequency of 0.5-1Hz taking on an appearance of slow spike and wave (rare).  -Sharp Waves, Regional, bilateral independent Frontotemporal, Maximum F7/T7, F8/T8, Fp1/F3 and Fp2/F4  -3 electrographic seizures with  minimal clinical correlate, non-localizable, diffuse onset.  Possible onset over the right temporal region (with grimace, grunting and slight head turn to the left) - times above  -Moderate background slowing     EEG Impression/Clinical Correlate:  1.	There were 3 electrographic seizures with minimal clinical correlate captured with diffuse onset, possible R temporal region.  2.	Risk for seizures from the bilateral frontal regions   3.	Risk for generalized seizures activity  4.	Moderate nonspecific diffuse or multifocal cerebral dysfunction    These findings can be seen in patients with multifocal or symptomatic generalized epilepsy.  Findings discussed with neurology team    Christianne Bonilla MD  Epilepsy Fellow Study Date: 3/22-3/23/2019    History:  CONCERN FOR SEIZURE    Study Interpretation:    FINDINGS:  The background was continuous, spontaneously variable and reactive. There was no PDR. The background consisted of continuous diffuse polymorphic and rhythmic delta and theta frequencies.     Sleep Background:  Drowsiness and Stage II sleep transients were not recorded.    Other Non-Epileptiform Findings:  None were present.    Interictal Epileptiform Activity:   -Sharp Waves, Generalized, Maximum Bi-Frontal sometimes in short runs up to 8 seconds with a frequency of 0.5-1Hz  -Sharp Waves, Regional, bilateral independent Frontotemporal, Maximum F7/T7 and F8/T8    Events:     3 electrographic seizures captured with non-localizable, diffuse onsetl.     EEG Description: Burst of alpha/beta activity over the both hemispheres with diffuse attenuation of the background, progressing to admixture of semirhythmic alpha/theta activity, Duration ~15-40 sec.      Clinical Description: Minimal clinical component, With facial grimace, grunting sound with slight head turn to the left and possibly head jerk.    times of the seizures bellow:    bernadette	Duration	Title    d1 15:56:07		Seizure onset  d1 15:56:56		Patient Event  d1 15:57:00		offset (electrographic)  d1 17:13:12		Seizure onset  d1 17:13:42		offset (electrographic)  d1 18:52:56		Seizure onset  d1 18:53:38		offset (electrographic)  d2 01:06:05		Seizure onset  d2 01:06:38		offset (electrographic)  d2 extensive artefact      Activation Procedures:   Hyperventilation was not performed.    Photic stimulation was not performed.    Artifacts:  Intermittent myogenic and movement artifacts were noted.    ECG:  The heart rate on single channel ECG was predominantly between 60-70 BPM.    EEG Summary/Classification:  Abnormal EEG in awake state  -Sharp Waves, Generalized, Maximum Bi-Frontal, sometimes with frequency of 0.5-1Hz taking on an appearance of slow spike and wave (rare).  -Sharp Waves, Regional, bilateral independent Frontotemporal, Maximum F7/T7, F8/T8, Fp1/F3 and Fp2/F4  -3 electrographic seizures with  minimal clinical correlate, non-localizable, diffuse onset.    -Moderate background slowing     EEG Impression/Clinical Correlate:  1.	There were 3 electrographic seizures with minimal clinical correlate captured with diffuse onset.  2.	Risk for seizures from the bilateral frontal regions   3.	Risk for generalized seizures activity  4.	Moderate nonspecific diffuse or multifocal cerebral dysfunction    These findings can be seen in patients with multifocal or symptomatic generalized epilepsy.  Findings discussed with neurology team    Christianne Bonilla MD  Epilepsy Fellow -- No pressure injuries noted per flow sheets

## 2023-11-19 NOTE — PATIENT PROFILE ADULT - BRADEN MOBILITY
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

## 2024-01-15 NOTE — PATIENT PROFILE ADULT - NSPROGENANESREACTION_GEN_A_NUR
Alert-The patient is alert, awake and responds to voice. The patient is oriented to time, place, and person. The triage nurse is able to obtain subjective information.
unable to assess - cognitively impaired

## 2024-01-23 NOTE — ED PROVIDER NOTE - CLINICAL SUMMARY MEDICAL DECISION MAKING FREE TEXT BOX
55 yr old F with cerebral palsy, profound intellectual disability, bipolar disorder, seizure disorder, dysphagia, chronic constipation presents from group home after removing her g-tube at noon today. Staff attempted to secure in place. Will replace g-tube given >6 weeks since initial placement and confirm placement with abdominal x-ray with gastrograffin. Given lack of other presenting signs/symptoms, no further work-up necessary at this time. Will re-evaluate.
97.6

## 2024-01-23 NOTE — PROGRESS NOTE ADULT - PROBLEM SELECTOR PLAN 4
Had episode of bradycardia to 30s on telemetry in MICU with concern for possible 2nd degree AV block type II.  - EP evaluated, determined not heart block, NTD
- S&S lawal recs pureed diet honey thickened  - giving AEDs PO now, however, cannot go back to group on liquid AEDs  - pending cinesophagram this AM
- cinesophagram recs dysphagia 2 diet honey thickened  - discharge on oral AEDs
Had episode of bradycardia to 30s on telemetry in MICU with concern for possible 2nd degree AV block type II.  - EP evaluated, determined not heart block, ntd
Hypothermic in MICU overnight to 95.5, possible side effect of valproic acid. Blood and urine cultures from 6/9 show no growth to date.   - cont to monitor off antibiotics
abdominal pain since today. seen at  and given medications with minimal relief found. Endorses some nausea but denies any V/D. Denies any recent travel; not sexually active, Denies any dysuria or burning upon urination at this time.
-  episode of bradycardia to 30s on telemetry in MICU with concern for possible 2nd degree AV block type II.  - EP evaluated, determined not heart block, NTD
- S&S eval recs pureed diet honey thickened  - giving AEDs PO now
Had episode of bradycardia to 30s on telemetry in MICU with concern for possible 2nd degree AV block type II.  - EP evaluated, determined not heart block, ntd
- failed bedside swallow eval in setting of lethargy and sedation from AEDs  - will obtain official S&S eval today

## 2024-01-24 NOTE — H&P ADULT - DOES THIS PATIENT HAVE A HISTORY OF OR HAS BEEN DX WITH HEART FAILURE?
Infusion complete. Tolerated it well. IV removed. Assisted pt off unit in good condition. Instructed to follow up with physicain. Pt verbalized understanding.   no

## 2024-02-08 NOTE — ED ADULT TRIAGE NOTE - BP NONINVASIVE DIASTOLIC (MM HG)
53 yo F scheduled for replacement of right breast implant -Fascia fat grafting to right breast on 2/20/24
74

## 2024-03-20 RX ORDER — KETOCONAZOLE 20 MG/G
1 AEROSOL, FOAM TOPICAL
Qty: 0 | Refills: 0 | DISCHARGE

## 2024-03-20 RX ORDER — BUDESONIDE, MICRONIZED 100 %
2 POWDER (GRAM) MISCELLANEOUS
Qty: 0 | Refills: 0 | DISCHARGE

## 2024-03-20 RX ORDER — BENZTROPINE MESYLATE 1 MG
2 TABLET ORAL
Qty: 0 | Refills: 0 | DISCHARGE

## 2024-03-20 RX ORDER — MAGNESIUM HYDROXIDE 400 MG/1
30 TABLET, CHEWABLE ORAL
Qty: 0 | Refills: 0 | DISCHARGE

## 2024-03-20 RX ORDER — NYSTATIN CREAM 100000 [USP'U]/G
1 CREAM TOPICAL
Qty: 0 | Refills: 0 | DISCHARGE

## 2024-03-20 RX ORDER — METRONIDAZOLE 7.5 MG/G
1 GEL VAGINAL
Qty: 0 | Refills: 0 | DISCHARGE

## 2024-03-20 RX ORDER — MULTIVIT-MIN/FERROUS GLUCONATE 9 MG/15 ML
1 LIQUID (ML) ORAL
Qty: 0 | Refills: 0 | DISCHARGE

## 2024-03-20 RX ORDER — LEVETIRACETAM 250 MG/1
6 TABLET, FILM COATED ORAL
Qty: 0 | Refills: 0 | DISCHARGE

## 2024-03-20 RX ORDER — LACTULOSE 10 G/15ML
15 SOLUTION ORAL
Qty: 0 | Refills: 0 | DISCHARGE

## 2024-03-20 RX ORDER — OLANZAPINE 15 MG/1
1 TABLET, FILM COATED ORAL
Qty: 0 | Refills: 0 | DISCHARGE

## 2024-03-20 RX ORDER — FERROUS SULFATE 325(65) MG
1 TABLET ORAL
Qty: 0 | Refills: 0 | DISCHARGE

## 2024-03-20 RX ORDER — CALCIUM CARBONATE 500(1250)
1 TABLET ORAL
Qty: 0 | Refills: 0 | DISCHARGE

## 2024-03-20 RX ORDER — FUROSEMIDE 40 MG
1 TABLET ORAL
Qty: 0 | Refills: 0 | DISCHARGE

## 2024-03-20 RX ORDER — POLYETHYLENE GLYCOL 3350 17 G/17G
17 POWDER, FOR SOLUTION ORAL
Qty: 0 | Refills: 0 | DISCHARGE

## 2024-03-20 RX ORDER — ALBUTEROL 90 UG/1
3 AEROSOL, METERED ORAL
Qty: 0 | Refills: 0 | DISCHARGE

## 2024-03-20 RX ORDER — ALBUTEROL 90 UG/1
2 AEROSOL, METERED ORAL
Qty: 0 | Refills: 0 | DISCHARGE

## 2024-03-20 RX ORDER — CLINDAMYCIN PHOSPHATE GEL USP, 1% 10 MG/G
1 GEL TOPICAL
Qty: 0 | Refills: 0 | DISCHARGE

## 2024-03-20 RX ORDER — SENNA PLUS 8.6 MG/1
2 TABLET ORAL
Qty: 0 | Refills: 0 | DISCHARGE

## 2024-03-20 RX ORDER — LEVETIRACETAM 250 MG/1
1 TABLET, FILM COATED ORAL
Qty: 0 | Refills: 0 | DISCHARGE

## 2024-03-20 RX ORDER — CALCIUM CARBONATE 500(1250)
5 TABLET ORAL
Qty: 0 | Refills: 0 | DISCHARGE

## 2024-03-20 RX ORDER — ALENDRONATE SODIUM 70 MG/1
1 TABLET ORAL
Qty: 0 | Refills: 0 | DISCHARGE

## 2024-03-20 RX ORDER — KETOCONAZOLE 20 MG/G
0 AEROSOL, FOAM TOPICAL
Qty: 0 | Refills: 0 | DISCHARGE

## 2024-03-20 RX ORDER — NYSTATIN CREAM 100000 [USP'U]/G
0 CREAM TOPICAL
Qty: 0 | Refills: 0 | DISCHARGE

## 2024-03-20 RX ORDER — FLUTICASONE PROPIONATE 50 MCG
1 SPRAY, SUSPENSION NASAL
Qty: 0 | Refills: 0 | DISCHARGE

## 2024-03-20 RX ORDER — MIDAZOLAM HYDROCHLORIDE 1 MG/ML
1 INJECTION, SOLUTION INTRAMUSCULAR; INTRAVENOUS
Qty: 0 | Refills: 0 | DISCHARGE

## 2024-03-20 RX ORDER — FERROUS SULFATE 325(65) MG
5 TABLET ORAL
Qty: 0 | Refills: 0 | DISCHARGE

## 2024-03-20 RX ORDER — DIVALPROEX SODIUM 500 MG/1
1 TABLET, DELAYED RELEASE ORAL
Qty: 0 | Refills: 0 | DISCHARGE

## 2024-03-20 RX ORDER — CARBAMAZEPINE 200 MG
1 TABLET ORAL
Qty: 0 | Refills: 0 | DISCHARGE

## 2024-03-20 RX ORDER — ASPIRIN/CALCIUM CARB/MAGNESIUM 324 MG
1 TABLET ORAL
Qty: 0 | Refills: 0 | DISCHARGE

## 2024-03-20 RX ORDER — VALPROIC ACID (AS SODIUM SALT) 250 MG/5ML
1 SOLUTION, ORAL ORAL
Qty: 0 | Refills: 0 | DISCHARGE

## 2024-03-20 RX ORDER — CARBAMAZEPINE 200 MG
5 TABLET ORAL
Qty: 0 | Refills: 0 | DISCHARGE

## 2024-03-20 RX ORDER — SENNA PLUS 8.6 MG/1
10 TABLET ORAL
Qty: 0 | Refills: 0 | DISCHARGE

## 2024-03-20 RX ORDER — DOCUSATE SODIUM 100 MG
3 CAPSULE ORAL
Qty: 0 | Refills: 0 | DISCHARGE

## 2024-03-20 RX ORDER — THIAMINE MONONITRATE (VIT B1) 100 MG
1 TABLET ORAL
Qty: 0 | Refills: 0 | DISCHARGE

## 2024-03-20 RX ORDER — CICLOPIROX OLAMINE 7.7 MG/G
1 CREAM TOPICAL
Qty: 0 | Refills: 0 | DISCHARGE

## 2024-03-20 RX ORDER — ESOMEPRAZOLE MAGNESIUM 40 MG/1
1 CAPSULE, DELAYED RELEASE ORAL
Qty: 0 | Refills: 0 | DISCHARGE

## 2024-03-20 RX ORDER — SENNA PLUS 8.6 MG/1
17.6 TABLET ORAL
Qty: 0 | Refills: 0 | DISCHARGE

## 2024-03-20 RX ORDER — FERROUS SULFATE 325(65) MG
7.5 TABLET ORAL
Qty: 0 | Refills: 0 | DISCHARGE

## 2024-03-20 RX ORDER — LANOLIN/MINERAL OIL
1 LOTION (ML) TOPICAL
Qty: 0 | Refills: 0 | DISCHARGE

## 2024-03-20 RX ORDER — GUAIFENESIN/DEXTROMETHORPHAN 600MG-30MG
2 TABLET, EXTENDED RELEASE 12 HR ORAL
Qty: 0 | Refills: 0 | DISCHARGE

## 2024-03-20 RX ORDER — PETROLATUM,WHITE
1 JELLY (GRAM) TOPICAL
Qty: 0 | Refills: 0 | DISCHARGE

## 2024-03-20 RX ORDER — FOLIC ACID 0.8 MG
1 TABLET ORAL
Qty: 0 | Refills: 0 | DISCHARGE

## 2024-03-20 RX ORDER — GUAIFENESIN/DM/PSEUDOEPHEDRINE 595-32-48
10 TABLET, EXTENDED RELEASE 12 HR ORAL
Qty: 0 | Refills: 0 | DISCHARGE

## 2024-03-20 RX ORDER — BENZTROPINE MESYLATE 1 MG
1 TABLET ORAL
Qty: 0 | Refills: 0 | DISCHARGE

## 2024-03-20 RX ORDER — SALICYLIC ACID 0.5 %
1 CLEANSER (GRAM) TOPICAL
Qty: 0 | Refills: 0 | DISCHARGE

## 2024-03-20 RX ORDER — ACETAMINOPHEN 500 MG
2 TABLET ORAL
Qty: 0 | Refills: 0 | DISCHARGE

## 2024-04-29 NOTE — DIETITIAN INITIAL EVALUATION ADULT. - EST PROTEIN NEEDS5
Tosin Miller RPH (11/13/2023)   Next INR check:  5/13/Nicolle Dickerson APRN Referring Nurse Practitioner [phone]    Joselito Blevins MD Referring Cardiology [phone]            Clinic Interview:  Patient Findings     Positives:  Change in medications    Negatives:  Signs/symptoms of thrombosis, Signs/symptoms of bleeding,   Laboratory test error suspected, Change in health, Change in alcohol use,   Change in activity, Upcoming invasive procedure, Emergency department   visit, Upcoming dental procedure, Missed doses, Extra doses, Change in   diet/appetite, Hospital admission, Bruising, Other complaints    Comments:  Patient reports stopping a MVI and eye vitamins ([employer]s);   however plans to restart.       Clinical Outcomes     Negatives:  Major bleeding event, Thromboembolic event,   Anticoagulation-related hospital admission, Anticoagulation-related ED   visit, Anticoagulation-related fatality    Comments:  Patient reports stopping a MVI and eye vitamins ([employer]s);   however plans to restart.         INR History:      11/28/2023    10:00 AM 12/11/2023    10:30 AM 1/8/2024    10:30 AM 1/23/2024    12:08 PM 2/5/2024    10:30 AM 3/21/2024    10:00 AM 4/29/Lizz Boudreaux, PharmD   54.9

## 2024-05-21 NOTE — ED ADULT NURSE NOTE - NS ED NURSE RECORD ANOTHER VITAL SIGN
Population Health Social Work Referral    Priority: Routine      Reason for Social Work Referral:  Housing (Z59.1)/Homelessness (Z59.0):  needs assistance   Financial (Z59.9):  lost job  Transportation for medical needs (Z74.1):  lost car  Additional information:  Lost jhob about a year ago and needs financial assistance.      Yes

## 2024-06-19 NOTE — BEHAVIORAL HEALTH ASSESSMENT NOTE - MODIFICATIONS
[FreeTextEntry1] : 82 yo M pmhx HTN, high cholesterol, arthritis, asthma (previously on Advair), allergies (on Flonase), DM type II, BPH, Parkinson's. Referred by Dr. Prasad to evaluation and management of chronic productive cough and suspected recurrent aspiration   --airway clearance regimen as follows (twice a day): 1. Albuterol neb followed by 2. Hypertonic saline neb     - both while wearing the Vest (when it arrives) 3. Take breaks every 3-5 min to perform Mena cough  Due to pt's PD, it is difficult for him to mainain adequate seal around the Aerobika mouthpeace making the use of OPEP device ineffective and necessitating the use of HFCWO (Vest therapy) --swallow evaluation: -referral to SLP and barium swallow are pending --nutrition evaluation: referred to RD (DM, PD aspiration, cachexia) FU with Dr. Prasad scheduled in early July Needs FU with neurology RTC in 3 mo  
As below

## 2024-07-22 NOTE — ED ADULT NURSE NOTE - NSSEPSISSUSPECTED_ED_A_ED
He is having the s/sx of pouchitis again.  I will have him start the Cipro again for 2 weeks.  He will call with any issues.   No

## 2024-08-21 NOTE — EEG REPORT - NS EEG TEXT BOX
No bone infection on Xray.  What we were hoping to see.  Study Date: 3/21-3/22/2019    History:  CONCERN FOR SEIZURE    Study Interpretation:    FINDINGS:  The background was continuous, spontaneously variable and reactive. There was no PDR. The background consisted of continuous diffuse polymorphic and rhythmic delta and theta frequencies.     Sleep Background:  Drowsiness and Stage II sleep transients were not recorded.    Other Non-Epileptiform Findings:  None were present.    Interictal Epileptiform Activity:   -Sharp Waves, Generalized, Maximum Bi-Frontal sometimes in short runs up to 8 seconds with a frequency of 0.5-1Hz  -Sharp Waves, Regional, bilateral independent Frontotemporal, Maximum F7/T7 and F8/T8    Events:     Numerous electrographic seizures captured with non-localizable, diffuse onset.  Three of these seizures with possible rhythmic theta activity over the right anterior head region prior to diffuse spread.    EEG Description: Burst of alpha/beta activity over the both hemispheres with diffuse attenuation of the background, progressing to admixture of semirhythmic alpha/theta activity, Duration ~15-40 sec.  Three of these seizures were proceeded by right anterior rhythmic theta/delta activity.    Clinical Description: Most seizures without clinical component, the three seizures with right sided onset had a grunting sound with slight head turn to the left.    Times below:    d1 12:37:38		Seizure  d1 13:14:32		Seizure  d1 13:33:42		Seizure  d1 14:13:42		Seizure  d1 14:40:12		Seizure  d1 14:47:12		Seizure  d1 15:17:42		Seizure  d1 15:37:22		Seizure  d1 15:54:52		Seizure  d1 16:55:02		Seizure  d1 17:15:52		Seizure  d1 17:26:32		Seizure  d1 17:42:22		Seizure  d1 18:08:02		Seizure  d1 18:22:02		Seizure  d1 20:12:24		Seizure  d1 21:38:59		Seizure  d1 22:34:11		Seizure  d1 23:11:21		Seizure  d1 23:21:01		Seizure  d1 23:31:45		Seizure  d2 00:24:06		seizure  d2 00:36:45		seizure  d2 01:23:51		Seizure  d2 01:51:11		Seizure  d2 02:27:31		Seizure  d2 02:41:41		Seizure  d2 02:54:01		Seizure  d2 03:44:41		Seizure  d2 04:08:21		Seizure  d2 04:29:11		Seizure  d2 08:27:00		End of Study    Activation Procedures:   Hyperventilation was not performed.    Photic stimulation was not performed.    Artifacts:  Intermittent myogenic and movement artifacts were noted.    ECG:  The heart rate on single channel ECG was predominantly between 60-70 BPM.    EEG Summary/Classification:  Abnormal EEG in awake state  -Sharp Waves, Generalized, Maximum Bi-Frontal, sometimes with frequency of 0.5-1Hz taking on an appearance of slow spike and wave (rare).  -Sharp Waves, Regional, bilateral independent Frontotemporal, Maximum F7/T7, F8/T8, Fp1/F3 and Fp2/F4  -Multiple electrographic seizures with non-localizable, diffuse onset.  Three of these appeared to originate over the right anterior head region (with grunting and slight head turn to the left) - times above  -Moderate background slowing     EEG Impression/Clinical Correlate:  1.	There were numerous electrographic seizures captured with diffuse onset, three with right anterior onset that had grunting sounds with head turn to the left  2.	Risk for seizures from the bilateral anterior head regions   3.	Risk for generalized seizures activity  4.	Moderate nonspecific diffuse or multifocal cerebral dysfunction    These findings can be seen in patients with multifocal or symptomatic generalized epilepsy.  Findings given to neurology team.    Ciro Quiñonez MD Study Date: 3/21-3/22/2019    History:  CONCERN FOR SEIZURE    Study Interpretation:    FINDINGS:  The background was continuous, spontaneously variable and reactive. There was no PDR. The background consisted of continuous diffuse polymorphic and rhythmic delta and theta frequencies.     Sleep Background:  Drowsiness and Stage II sleep transients were not recorded.    Other Non-Epileptiform Findings:  None were present.    Interictal Epileptiform Activity:   -Sharp Waves, Generalized, Maximum Bi-Frontal sometimes in short runs up to 8 seconds with a frequency of 0.5-1Hz  -Sharp Waves, Regional, bilateral independent Frontotemporal, Maximum F7/T7 and F8/T8    Events:     Numerous electrographic seizures captured with non-localizable, diffuse onset.  Three of these seizures with possible rhythmic theta activity over the right anterior head region prior to diffuse spread.    EEG Description: Burst of alpha/beta activity over the both hemispheres with diffuse attenuation of the background, progressing to admixture of semirhythmic alpha/theta activity, Duration ~15-40 sec.  Three of these seizures were proceeded by right anterior rhythmic theta/delta activity.    Clinical Description: Most seizures without clinical component, the three seizures with right sided onset had a grunting sound with slight head turn to the left.    Times below:    d1 12:37:38		Seizure  d1 13:14:32		Seizure  d1 13:33:42		Seizure  d1 14:13:42		Seizure  d1 14:40:12		Seizure  d1 14:47:12		Seizure  d1 15:17:42		Seizure  d1 15:37:22		Seizure  d1 15:54:52		Seizure  d1 16:55:02		Seizure  d1 17:15:52		Seizure  d1 17:26:32		Seizure  d1 17:42:22		Seizure  d1 18:08:02		Seizure  d1 18:22:02		Seizure  d1 20:12:24		Seizure  d1 21:38:59		Seizure  d1 22:34:11		Seizure  d1 23:11:21		Seizure  d1 23:21:01		Seizure  d1 23:31:45		Seizure  d2 00:24:06		seizure  d2 00:36:45		seizure  d2 01:23:51		Seizure  d2 01:51:11		Seizure  d2 02:27:31		Seizure  d2 02:41:41		Seizure  d2 02:54:01		Seizure  d2 03:44:41		Seizure  d2 04:08:21		Seizure  d2 04:29:11		Seizure  d2 09:25:11		Seizure  Activation Procedures:   Hyperventilation was not performed.    Photic stimulation was not performed.    Artifacts:  Intermittent myogenic and movement artifacts were noted.    ECG:  The heart rate on single channel ECG was predominantly between 60-70 BPM.    EEG Summary/Classification:  Abnormal EEG in awake state  -Sharp Waves, Generalized, Maximum Bi-Frontal, sometimes with frequency of 0.5-1Hz taking on an appearance of slow spike and wave (rare).  -Sharp Waves, Regional, bilateral independent Frontotemporal, Maximum F7/T7, F8/T8, Fp1/F3 and Fp2/F4  -Multiple electrographic seizures with non-localizable, diffuse onset.  Three of these appeared to originate over the right anterior head region (with grunting and slight head turn to the left) - times above  -Moderate background slowing     EEG Impression/Clinical Correlate:  1.	There were numerous electrographic seizures captured with diffuse onset, three with right anterior onset that had grunting sounds with head turn to the left  2.	Risk for seizures from the bilateral anterior head regions   3.	Risk for generalized seizures activity  4.	Moderate nonspecific diffuse or multifocal cerebral dysfunction    These findings can be seen in patients with multifocal or symptomatic generalized epilepsy.  Findings given to neurology team.    Ciro Quiñonez MD

## 2024-09-03 NOTE — ED ADULT NURSE NOTE - ISOLATION TYPE:
Body Location Override (Optional): Rt Malar Cheek Detail Level: Detailed Pathology Override (Pathology Will Render As Diagnosis Name If Left Blank): BCC Field Number: 1 Shield Size In Cm: 3 Applicator Size In Cm: 4.0 cm Energy (Kv): 70 Treatment Time (Min): 0.41 Time, Dose, Fractionation Factor (Tdf) For Prescription 1: 100 Daily Dose (Cgy): 279.62 Number Of Fractions For Prescription 1: 20 Total Dose For Prescription 1 (Cgy): 5592.40 Add A Second Prescription?: No Additional Fraction(S) Needed:: 0 Bill For Physics Consultation: No - Render Text Only Physics Documentation: (Physics Check Verbiage) None

## 2024-09-25 NOTE — DISCHARGE NOTE NURSING/CASE MANAGEMENT/SOCIAL WORK - NSCORESITESY/N_GEN_A_CORE_RD
ENDOSCOPY DISCHARGE INSTRUCTIONS       __ EGD  __Colonoscopy  xEGD/Colonoscopy    __Flexible Sigmoidoscopy  __Esophageal Dilatation   __EGD Banding        Follow-up office as 1-2 months    Call for pathology in 2 weeks    No aspirin products for 5 days    Call if any questions or problems    Resume present medications   Diet:   *Avoid alcoholic beverages for 24 hours.    Resume normal diet - unless otherwise instructed.  Light meal.       For Pain of Discomfort:    * After an EGD, you may experience a slight sore throat which will subside.             You may find throat lozenges or gargles are helpful.     * It is not unusual to feel bloated or full of gas after these procedures. This discomfort will pass.         * Do not take any aspirin or any new medication without asking your doctor.         * If you develop a lump or redness at the site or the IV            you may apply a warm wash cloth to the area for 15-20 minutes, 2-3 times daily.     * Last dose of pain meds were given at :    Activity:    * Avoid driving for 24 hours.    * Rest today, normal activity tomorrow as tolerated         Special Instructions:         * If biopsies were taken, call your doctor's office in 7 days for the results.     * Special instructions:      Your Regular Medications:    * Resume all medications unless otherwise instructed    *  Routine aspirin-Resume on:          * Coumadin/blood thinners-Resume on :         Call your physician for any of the followin.  Persistent bleeding   ** a small amount of bleeding may be noticed if biopsies were taken or polyps removed. Call Doctor  if heavy bleeding-over 2 teaspoons is noted  2.  Difficulty breathing or swallowing  3.  Fever or chills  4.  Increased confusion / severe headache  5.  Persistent nausea, vomiting and/or diarrhea  6.  Drainage from wound with odor  7.  Severe pain, numbness, coldness, or color change in extremity  8. Abdominal pain      24, 9:14 AM    
No

## 2024-11-21 NOTE — PATIENT PROFILE ADULT - FALL HARM RISK TYPE OF ASSESSMENT
Chronic, at goal (stable), continue current treatment plan  Recheck labs  Orders:    CBC with Auto Differential; Future    Comprehensive Metabolic Panel; Future    Comprehensive Metabolic Panel    CBC with Auto Differential     daily assessment

## 2024-11-29 NOTE — PROGRESS NOTE BEHAVIORAL HEALTH - LANGUAGE
Gastroenterology Progress Note            Milana Pardo is a 79 y.o. female patient.  1. Chronic obstructive pulmonary disease with acute exacerbation (HCC)    2. Pneumonia due to infectious organism, unspecified laterality, unspecified part of lung    3. Hematochezia        SUBJECTIVE:  diarrhea much improved.     Physical    VITALS:  BP (!) 142/65   Pulse 50   Temp 97.9 °F (36.6 °C) (Oral)   Resp 20   Ht 1.676 m (5' 6\")   Wt 124.7 kg (275 lb)   SpO2 94%   BMI 44.39 kg/m²   TEMPERATURE:  Current - Temp: 97.9 °F (36.6 °C); Max - Temp  Av.9 °F (36.6 °C)  Min: 97.8 °F (36.6 °C)  Max: 97.9 °F (36.6 °C)    Abdomen soft, ND, NT, no HSM, Bowel sounds normal     Data      Recent Labs     24  0650 24  0448 24  0434   WBC 12.8* 12.2* 14.1*   HGB 14.6 14.0 13.0   HCT 46.7 42.8 40.0   MCV 87.6 85.3 84.7    207 208     Recent Labs     24  0650 24  0448 24  0434   * 139 138   K 4.4 4.9 4.1   CL 97* 100 101   CO2 24 30 28   BUN 27* 23* 25*   CREATININE 0.9 0.9 1.2     No results for input(s): \"AST\", \"ALT\", \"BILIDIR\", \"BILITOT\", \"ALKPHOS\" in the last 72 hours.    Invalid input(s): \"ALB\"  No results for input(s): \"LIPASE\", \"AMYLASE\" in the last 72 hours.      ASSESSMENT   Chronic diarrhea- x years.  Colon neg but path pending.  Much improved perhaps from questran  Hematochezia only small hemorrhoids noted.  Hgb remains normal.            PLAN    F/u path from colon.   Questran started   Ok to resume xarelto   Ok for dc when has arrangements at home.      Carlos Lopez MD  [facility]  
Other
Other

## 2024-12-30 NOTE — PROVIDER CONTACT NOTE (OTHER) - ASSESSMENT
Result letter sent to patient. Surgical history and health maintenance updated. Wait list appointment entered.     SW spoke with [facility] RN Stephie (p:[phone]) who confirmed pt is able to return. Stephie advised that pt typically travels via EMS with EMS staff supervision. Verbal huddle regarding coordination of care completed with IDT.     SW contacted [facility] and arranged pickup for next available-within hr (trip#[id number]).   No other SW needs identified at this time.

## 2025-01-13 NOTE — PROVIDER CONTACT NOTE (OTHER) - REASON
Pt blood glucose is 69 mg/dl
To work on diet, regular exercise, monitor sugars, check feet daily, set up her optometrist appt.    
Pt blood glucose is 69 mg/dl
Pt temp is 96.1F
Pt with low FS 65 mg/dl
Hypoglycemia

## 2025-02-13 NOTE — ED ADULT TRIAGE NOTE - NS ED NURSE BANDS TYPE
Pt called and vm left regarding the need to cancel and reschedule the pt's colonoscopy with Dr. Carter on Thursday, 2/20 due to the pt's PCP recommend the pt be evaluated bycardiology before having the procedure in [facility]. Pt left the office phone number to call to discuss this further.   Name band;

## 2025-04-08 NOTE — PATIENT PROFILE ADULT. - MEDICATION HERBAL REMEDIES, PROFILE
Population Health Chart Review & Patient Outreach Details    Updates Requested / Reviewed:  [x]  Care Team Updated  [x]  Care Everywhere Updated & Reviewed  [x]  MIIX Reviewed    Health Maintenance Topics Addressed and Outreach Outcomes / Actions Taken:  Immunizations  [x] Immunizations have been historically updated to reflect information in MIIX.      [x] Patient needs Tetanus, Meningococcal, and HPV vaccines. Comment placed in the chart that pt needs these. No upcoming appts scheduled at this time.             
no

## 2025-05-12 NOTE — PROGRESS NOTE ADULT - PROBLEM SELECTOR PLAN 6
Patient released from Department - chronic hypothermia- normothermic at present  - sputum cx with MRSA, but without leukocytosis, will monitor off abx for now  - blood cultures negative

## 2025-05-19 NOTE — PROGRESS NOTE BEHAVIORAL HEALTH - NSBHFUPINTERVALHXFT_PSY_A_CORE
See other message from 5/18/25  
Patient seen and evaluated, appears more awake and alert today, not sedated , however please note that pt. is non-verbal at baseline.  As per aid from  at bedside: pt. is usually more interactive and not at her baseline yet. Pt. did not eat her breakfast  As per chart, pt. received PRN Zyprexa 5mg IM X1 at 20;57 on 10/24 and did not get her standing Zyprexa as prn was already given.
Patient seen and evaluated, appears alert today, not sedated, however did not engage in interview, as pt. is non-verbal at baseline.  As per aid from  at bedside: pt. is usually more interactive and still not at her baseline yet. Aid was able to feed her apple sauce and some milk.    pt. did not get any PRNs overnight and took her standing Zyprexa  yesterday.
50194 Exp Problem Focused - Mod. Complex

## 2025-07-11 NOTE — ED ADULT NURSE NOTE - NSICDXPASTSURGICALHX_GEN_ALL_CORE_FT
PAST SURGICAL HISTORY:  Gastrostomy tube dependent      patient meets criteria for malnutrition in the context of chronic illness